# Patient Record
Sex: MALE | Race: WHITE | NOT HISPANIC OR LATINO | Employment: UNEMPLOYED | URBAN - METROPOLITAN AREA
[De-identification: names, ages, dates, MRNs, and addresses within clinical notes are randomized per-mention and may not be internally consistent; named-entity substitution may affect disease eponyms.]

---

## 2017-01-19 ENCOUNTER — HOSPITAL ENCOUNTER (OUTPATIENT)
Dept: RADIOLOGY | Facility: CLINIC | Age: 52
Discharge: HOME | End: 2017-01-19
Admitting: EMERGENCY MEDICINE
Payer: MEDICARE

## 2017-01-19 ENCOUNTER — OFFICE VISIT (OUTPATIENT)
Dept: URGENT CARE | Facility: CLINIC | Age: 52
End: 2017-01-19
Payer: MEDICARE

## 2017-01-19 DIAGNOSIS — M25.579 PAIN IN ANKLE: ICD-10-CM

## 2017-01-19 PROCEDURE — 99214 OFFICE O/P EST MOD 30 MIN: CPT

## 2017-01-19 PROCEDURE — G0463 HOSPITAL OUTPT CLINIC VISIT: HCPCS

## 2017-01-19 PROCEDURE — 73610 X-RAY EXAM OF ANKLE: CPT

## 2017-02-21 ENCOUNTER — TRANSCRIBE ORDERS (OUTPATIENT)
Dept: LAB | Facility: CLINIC | Age: 52
End: 2017-02-21

## 2017-02-21 ENCOUNTER — APPOINTMENT (OUTPATIENT)
Dept: LAB | Facility: CLINIC | Age: 52
End: 2017-02-21
Payer: MEDICARE

## 2017-02-21 DIAGNOSIS — E55.9 VITAMIN D DEFICIENCY: ICD-10-CM

## 2017-02-21 DIAGNOSIS — R53.83 OTHER FATIGUE: ICD-10-CM

## 2017-02-21 LAB
25(OH)D3 SERPL-MCNC: 35.9 NG/ML (ref 30–100)
ERYTHROCYTE [DISTWIDTH] IN BLOOD BY AUTOMATED COUNT: 14.4 % (ref 11.6–15.1)
FERRITIN SERPL-MCNC: 115 NG/ML (ref 8–388)
FOLATE SERPL-MCNC: 15.7 NG/ML (ref 3.1–17.5)
HCT VFR BLD AUTO: 41.8 % (ref 36.5–49.3)
HGB BLD-MCNC: 14.4 G/DL (ref 12–17)
IRON SERPL-MCNC: 101 UG/DL (ref 65–175)
MCH RBC QN AUTO: 32.2 PG (ref 26.8–34.3)
MCHC RBC AUTO-ENTMCNC: 34.4 G/DL (ref 31.4–37.4)
MCV RBC AUTO: 94 FL (ref 82–98)
PLATELET # BLD AUTO: 117 THOUSANDS/UL (ref 149–390)
PMV BLD AUTO: 12.2 FL (ref 8.9–12.7)
RBC # BLD AUTO: 4.47 MILLION/UL (ref 3.88–5.62)
TSH SERPL DL<=0.05 MIU/L-ACNC: 3.07 UIU/ML (ref 0.36–3.74)
VIT B12 SERPL-MCNC: 355 PG/ML (ref 100–900)
WBC # BLD AUTO: 4.96 THOUSAND/UL (ref 4.31–10.16)

## 2017-02-21 PROCEDURE — 82306 VITAMIN D 25 HYDROXY: CPT

## 2017-02-21 PROCEDURE — 82728 ASSAY OF FERRITIN: CPT

## 2017-02-21 PROCEDURE — 83540 ASSAY OF IRON: CPT

## 2017-02-21 PROCEDURE — 82607 VITAMIN B-12: CPT

## 2017-02-21 PROCEDURE — 84443 ASSAY THYROID STIM HORMONE: CPT

## 2017-02-21 PROCEDURE — 85027 COMPLETE CBC AUTOMATED: CPT

## 2017-02-21 PROCEDURE — 36415 COLL VENOUS BLD VENIPUNCTURE: CPT

## 2017-02-21 PROCEDURE — 82746 ASSAY OF FOLIC ACID SERUM: CPT

## 2017-03-08 ENCOUNTER — GENERIC CONVERSION - ENCOUNTER (OUTPATIENT)
Dept: OTHER | Facility: OTHER | Age: 52
End: 2017-03-08

## 2017-03-08 ENCOUNTER — ALLSCRIPTS OFFICE VISIT (OUTPATIENT)
Dept: FAMILY MEDICINE CLINIC | Facility: CLINIC | Age: 52
End: 2017-03-08
Payer: MEDICARE

## 2017-03-08 DIAGNOSIS — R63.0 ANOREXIA: ICD-10-CM

## 2017-03-08 DIAGNOSIS — R53.83 OTHER FATIGUE: ICD-10-CM

## 2017-03-08 LAB — HBA1C MFR BLD HPLC: 6.2 %

## 2017-03-08 PROCEDURE — 99213 OFFICE O/P EST LOW 20 MIN: CPT | Performed by: NURSE PRACTITIONER

## 2017-03-10 ENCOUNTER — ALLSCRIPTS OFFICE VISIT (OUTPATIENT)
Dept: FAMILY MEDICINE CLINIC | Facility: CLINIC | Age: 52
End: 2017-03-10
Payer: MEDICARE

## 2017-03-10 ENCOUNTER — APPOINTMENT (OUTPATIENT)
Dept: LAB | Facility: HOSPITAL | Age: 52
End: 2017-03-10
Payer: MEDICARE

## 2017-03-10 DIAGNOSIS — R63.0 ANOREXIA: ICD-10-CM

## 2017-03-10 DIAGNOSIS — R53.83 OTHER FATIGUE: ICD-10-CM

## 2017-03-10 LAB
FLUAV AG SPEC QL: NORMAL
FLUBV AG SPEC QL: NORMAL
RSV B RNA SPEC QL NAA+PROBE: NORMAL

## 2017-03-10 PROCEDURE — 87798 DETECT AGENT NOS DNA AMP: CPT

## 2017-03-10 PROCEDURE — 99213 OFFICE O/P EST LOW 20 MIN: CPT | Performed by: NURSE PRACTITIONER

## 2017-05-19 ENCOUNTER — TRANSCRIBE ORDERS (OUTPATIENT)
Dept: LAB | Facility: CLINIC | Age: 52
End: 2017-05-19

## 2017-05-19 ENCOUNTER — APPOINTMENT (OUTPATIENT)
Dept: LAB | Facility: CLINIC | Age: 52
End: 2017-05-19
Payer: MEDICARE

## 2017-05-19 DIAGNOSIS — Z79.899 ENCOUNTER FOR LONG-TERM (CURRENT) USE OF OTHER MEDICATIONS: Primary | ICD-10-CM

## 2017-05-19 LAB
ALBUMIN SERPL BCP-MCNC: 3.5 G/DL (ref 3.5–5)
ALP SERPL-CCNC: 45 U/L (ref 46–116)
ALT SERPL W P-5'-P-CCNC: 25 U/L (ref 12–78)
ANION GAP SERPL CALCULATED.3IONS-SCNC: 6 MMOL/L (ref 4–13)
AST SERPL W P-5'-P-CCNC: 18 U/L (ref 5–45)
BILIRUB SERPL-MCNC: 0.26 MG/DL (ref 0.2–1)
BUN SERPL-MCNC: 25 MG/DL (ref 5–25)
CALCIUM SERPL-MCNC: 9 MG/DL (ref 8.3–10.1)
CHLORIDE SERPL-SCNC: 107 MMOL/L (ref 100–108)
CHOLEST SERPL-MCNC: 158 MG/DL (ref 50–200)
CO2 SERPL-SCNC: 27 MMOL/L (ref 21–32)
CREAT SERPL-MCNC: 0.64 MG/DL (ref 0.6–1.3)
EST. AVERAGE GLUCOSE BLD GHB EST-MCNC: 131 MG/DL
GFR SERPL CREATININE-BSD FRML MDRD: >60 ML/MIN/1.73SQ M
GLUCOSE P FAST SERPL-MCNC: 119 MG/DL (ref 65–99)
HBA1C MFR BLD: 6.2 % (ref 4.2–6.3)
HDLC SERPL-MCNC: 67 MG/DL (ref 40–60)
LDLC SERPL CALC-MCNC: 77 MG/DL (ref 0–100)
POTASSIUM SERPL-SCNC: 4.1 MMOL/L (ref 3.5–5.3)
PROT SERPL-MCNC: 6.8 G/DL (ref 6.4–8.2)
SODIUM SERPL-SCNC: 140 MMOL/L (ref 136–145)
TRIGL SERPL-MCNC: 70 MG/DL

## 2017-05-19 PROCEDURE — 83036 HEMOGLOBIN GLYCOSYLATED A1C: CPT

## 2017-05-19 PROCEDURE — 80053 COMPREHEN METABOLIC PANEL: CPT

## 2017-05-19 PROCEDURE — 36415 COLL VENOUS BLD VENIPUNCTURE: CPT

## 2017-05-19 PROCEDURE — 80061 LIPID PANEL: CPT

## 2017-06-15 ENCOUNTER — ALLSCRIPTS OFFICE VISIT (OUTPATIENT)
Dept: FAMILY MEDICINE CLINIC | Facility: CLINIC | Age: 52
End: 2017-06-15
Payer: MEDICARE

## 2017-06-15 PROCEDURE — 99213 OFFICE O/P EST LOW 20 MIN: CPT | Performed by: FAMILY MEDICINE

## 2017-06-26 ENCOUNTER — GENERIC CONVERSION - ENCOUNTER (OUTPATIENT)
Dept: OTHER | Facility: OTHER | Age: 52
End: 2017-06-26

## 2017-06-27 ENCOUNTER — GENERIC CONVERSION - ENCOUNTER (OUTPATIENT)
Dept: OTHER | Facility: OTHER | Age: 52
End: 2017-06-27

## 2017-10-26 ENCOUNTER — APPOINTMENT (OUTPATIENT)
Dept: RADIOLOGY | Facility: CLINIC | Age: 52
End: 2017-10-26
Payer: MEDICARE

## 2017-10-26 ENCOUNTER — OFFICE VISIT (OUTPATIENT)
Dept: URGENT CARE | Facility: CLINIC | Age: 52
End: 2017-10-26
Payer: MEDICARE

## 2017-10-26 DIAGNOSIS — R52 PAIN: ICD-10-CM

## 2017-10-26 PROCEDURE — 73610 X-RAY EXAM OF ANKLE: CPT

## 2017-10-26 PROCEDURE — 99213 OFFICE O/P EST LOW 20 MIN: CPT

## 2017-10-26 PROCEDURE — G0463 HOSPITAL OUTPT CLINIC VISIT: HCPCS

## 2017-10-28 NOTE — PROGRESS NOTES
Assessment  1  Ankle sprain (845 00) (J74 409A)    Plan  Ankle sprain    · Ace Bandage; Status:Complete;   Done: 40CZW0678 04:03PM  Common cold    · Levocetirizine Dihydrochloride 5 MG Oral Tablet  Diabetes Mellitus    · MetFORMIN HCl - 1000 MG Oral Tablet  Unlinked    · Benztropine Mesylate 1 MG Oral Tablet   · SEROquel 200 MG Oral Tablet (QUEtiapine Fumarate)    Discussion/Summary  Discussion Summary:   Xray appears negative for fracture  will follow up with radiologist report  wear ace wrap  can take ibuprofen for pain  Medication Side Effects Reviewed: Possible side effects of new medications were reviewed with the patient/guardian today  Understands and agrees with treatment plan: The treatment plan was reviewed with the patient/guardian  The patient/guardian understands and agrees with the treatment plan   Follow Up Instructions: Follow Up with your Primary Care Provider in 7 days  If your symptoms worsen, go to the nearest [facility] Emergency Department  Chief Complaint  1  Ankle Pain  Chief Complaint Free Text Note Form: Here with caretaker due to left ankle injury yesterday  History of Present Illness  HPI: 50year old male here with care taker  Patient fell yesterday and has left ankle pain, swelling and bruising  Caretaker has been using ice baths to reduce swelling  Patient is limping while walking  Hospital Based Practices Required Assessment:   Pain Assessment   the patient states they have pain  The pain is located in the ankle  Abuse And Domestic Violence Screen   Domestic violence screen not done today  Reason DV Screen not done: not alone in room    Ankle Pain: [facility] presents with complaints of ankle pain     Associated symptoms include swelling,-- localized bruising,-- decreased range of motion-- and-- difficulty bearing weight, but-- no redness,-- no warmth,-- no instability,-- no stiffness,-- no fever,-- no chills,-- no localized rash,-- no generalized rash,-- no pain in other joints-- and-- no lateral foot pain  Review of Systems  Focused-Male:   Constitutional: no fever or chills, feels well, no tiredness, no recent weight loss or weight gain  Cardiovascular: no complaints of slow or fast heart rate, no chest pain, no palpitations, no leg claudication or lower extremity edema  Respiratory: no complaints of shortness of breath, no wheezing or cough, no dyspnea on exertion, no orthopnea or PND  Musculoskeletal: joint swelling, but-- as noted in HPI  Integumentary: no complaints of skin rash or lesion, no itching or dry skin, no skin wounds  Neurological: no complaints of headache, no confusion, no numbness or tingling, no dizziness or fainting  ROS Reviewed:   ROS reviewed  Active Problems  1  Abrasion of left ear (910 0) (S00 412A)   2  Ankle Injury (959 7)   3  Ankle pain (719 47) (M25 579)   4  Ankle sprain (845 00) (S93 409A)   5  Appetite loss (783 0) (R63 0)   6  Cataract (366 9) (H26 9)   7  Cellulitis of face (682 0) (L03 211)   8  Cerebral palsy (343 9) (G80 9)   9  Colon cancer screening (V76 51) (Z12 11)   10  Common cold (460) (J00)   11  Constipation (564 00) (K59 00)   12  Contusion of knee, right (924 11) (S80 01XA)   13  Diabetes Mellitus (250 00)   14  Diabetes mellitus type 2, controlled (250 00) (E11 9)   15  Dysuria (788 1) (R30 0)   16  Encounter for PPD test (V74 1) (Z11 1)   17  Encounter for screening for diabetic retinopathy (V80 2) (Z13 5)   18  Facial rash (782 1) (R21)   19  Fatigue (780 79) (R53 83)   20  Flu vaccine need (V04 81) (Z23)   21  Foot contusion (924 20) (S90 30XA)   22  Foot Pain (Soft Tissue) (729 5)   23  [place] Seizure (345 10)   24  Headache (784 0) (R51)   25  Headache (784 0) (R51)   26  Hematuria (599 70) (R31 9)   27  Hyperlipidemia (272 4) (E78 5)   28  Hypertension (401 9) (I10)   29  Hypertension (401 9) (I10)   30  Hypothyroidism (244 9) (E03 9)   31  Hypothyroidism (244 9) (E03 9)   32   Knee injury, right, initial encounter (959 7) (S89 91XA)   33  Nausea (787 02) (R11 0)   34  Nearsightedness (367 1) (H52 10)   35  Need for influenza vaccination (V04 81) (Z23)   36  Need for Tdap vaccination (V06 1) (Z23)   37  Osteoporosis (733 00) (M81 0)   38  Routine health maintenance (V70 0) (Z00 00)   39  Scoliosis (737 30) (M41 9)   40  Screening for prostate cancer (V76 44) (Z12 5)   41  Seborrheic dermatitis of scalp (690 18) (L21 9)   42  Subconjunctival hemorrhage of left eye (372 72) (H11 32)   43  Toe fracture (826 0) (S92 919A)   44  Vitamin B12 deficiency (266 2) (E53 8)   45  Vitamin D deficiency (268 9) (E55 9)   46  Well adult health check (V70 0) (Z00 00)    Past Medical History  Active Problems And Past Medical History Reviewed: The active problems and past medical history were reviewed and updated today  Family History  Family History Reviewed: The family history was reviewed and updated today  Social History   · Denied: History of Alcohol Use (History)   · Denied: History of Drug Use   · Never A Smoker  Social History Reviewed: The social history was reviewed and updated today  The social history was reviewed and is unchanged  Surgical History  1  History of Appendectomy  Surgical History Reviewed: The surgical history was reviewed and updated today  Current Meds   1  Benztropine Mesylate 0 5 MG Oral Tablet; TAKE 1 TABLET TWICE DAILY AS NEEDED; Therapy: (Recorded:08Mar2017) to Recorded   2  Benztropine Mesylate 1 MG Oral Tablet; Therapy: (Recorded:02Jan2016) to Recorded   3  Calcium 500 +D 500-400 MG-UNIT Oral Tablet; Therapy: (Recorded:21Mar2014) to Recorded   4  [employer] Vitamin B-12 1000 MCG Oral Tablet; take 1 tablet every other day; Therapy: 96AYD1334 to (Last Rx:08Mar2017)  Requested for: 44GWJ9865 Ordered   5  Depakote  MG Oral Tablet Extended Release 24 Hour; Take 2 tablets in the am   and 3 tablets at night;    Therapy: (Recorded:08Mar2017) to Recorded   6  Docusate Sodium 100 MG Oral Capsule; TAKE 1 CAPSULE BY MOUTH TWICE DAILY ( 8   AM & 8 PM)  Requested for: 10GAR4835; Last Rx:15Jun2017 Ordered   7  Ergocalciferol 23567 UNIT CAPS; TAKE 1 CAPSULE WEEKLY  Requested for:   00OCE6388; Last Rx:16Sep2016 Ordered   8  Gabapentin 100 MG TABS; Take 200mg in the am, 100 mg at 2pm and 8pm;   Therapy: (Recorded:08Mar2017) to Recorded   9  Haldol 2 MG TABS; Therapy: (9651 1475) to Recorded   10  Ibuprofen 200 MG Oral Tablet; TAKE 2 TABLETS EVERY 6 HOURS AS NEEDED FOR    PAIN  Requested for: 46GPJ1215; Last Rx:16Sep2016 Ordered   11  Ibuprofen 400 MG Oral Tablet; TAKE 1 TABLET EVERY 6 HOURS AS NEEDED; Therapy: 55TBA6173 to (Han Drilling)  Requested for: 88SMM0779; Last    Rx:02Oct2017 Ordered   12  Ketoconazole 2 % External Shampoo; [address] HAIR IN SIDEBURNS AT LEAST THREE    TIMES PER WEEK (8 PM); Therapy: 41LFG2937 to (Evaluate:28Qbv9471)  Requested for: 48EUT7857; Last    Rx:16Zzi5583 Ordered   13  Levocetirizine Dihydrochloride 5 MG Oral Tablet; TAKE 1 TABLET BY MOUTH ONCE A DAY    AS NEEDED FOR ALLERGY, RUNNY NOSE; Therapy: 85YAU4009 to (Evaluate:08Apr2017)  Requested for: 08DYX1071; Last    Rx:09Mar2017 Ordered   14  Levocetirizine Dihydrochloride 5 MG Oral Tablet; TAKE 1 TABLET DAILY; Therapy: 58WRO8130 to (Evaluate:92Ffm4892)  Requested for: 05DWK9853; Last    Rx:02Oct2017 Ordered   15  Levothyroxine Sodium 100 MCG Oral Tablet; TAKE 1 TABLET DAILY AS DIRECTED; Therapy: 58MRL8690 to (Kaila Marvin)  Requested for: 55DFT6229; Last    Rx:05Jan2016 Ordered   16  Levothyroxine Sodium 100 MCG Oral Tablet; TAKE 1 TABLET DAILY AS DIRECTED; Therapy: 01QNY3571 to (Evaluate:01Jan2018)  Requested for: 30YDZ9847; Last    Rx:03Oct2017 Ordered   17  Lisinopril 10 MG Oral Tablet; TAKE 1 TABLET BY MOUTH ONCE DAILY AS DIRECTED (8    AM) ***STOP LISINOPRIL/ HCTZ 10/12  5MG *****;    Therapy: 62TCN6112 to (Evaluate:67Gch9378)  Requested for: 89GCX2805; Last    Rx:34Sfb1097 Ordered   18  Lisinopril-Hydrochlorothiazide 10-12 5 MG Oral Tablet; TAKE 1 TABLET DAILY; Therapy: 65PAH6962 to (Lacinda Shows)  Requested for: 01JLO6716; Last    Rx:03Jan2017 Ordered   19  Loratadine 10 MG Oral Tablet; TAKE 1 TABLET BY MOUTH ONCE A DAY AS NEEDED    FOR ALLERGY, RUNNY NOSE; Therapy: 41BHK4300 to (Evaluate:08Apr2017)  Requested for: 87RYB3127; Last    Rx:09Mar2017 Ordered   20  MetFORMIN HCl - 1000 MG Oral Tablet; TAKE 1 TABLET BY MOUTH 2 TIMES DAILY (8 AM    + 8 PM); Therapy: 77SLJ2430 to (Dossie Hamilton)  Requested for: 77WJG5270; Last    Rx:84Gff5696 Ordered   21  MetFORMIN HCl - 1000 MG Oral Tablet; Take 1 tablet twice daily; Therapy: 82ZVJ1065 to ([phone])  Requested for: 27JQB6770; Last    Rx:05Jan2016 Ordered   22  Multi-Vitamin TABS; Therapy: (Recorded:21Mar2014) to Recorded   23  Oyster Shell Calcium 500 MG Oral Tablet; TAKE 1 TABLET BY MOUTH 3 TIMES DAILY (8    AM, 2 PM & 8 PM); Therapy: 13HRN4858 to (Dossie Hamilton)  Requested for: 73KRD5934; Last    Rx:07Kjx4215 Ordered   24  Reguloid 58 6 % Oral Powder; DISSOLVE 3 5 GM (ROUNDED TEASPOONFUL) IN    WATER OR JUICE AND DRINK DAILY (8 AM) --- DX: PROMOTE BOWEL REGULARITY; Therapy: 90JJX4696 to (Evaluate:41Yxw7138)  Requested for: 14ECO7967; Last    Rx:59Xnu0643 Ordered   25  SEROquel 200 MG Oral Tablet; Therapy: (Recorded:21Mar2014) to Recorded   26  SEROquel  MG Oral Tablet Extended Release 24 Hour; Take one at 8am and one    at 2pm;    Therapy: (Recorded:04May2016) to Recorded   27  SEROquel  MG Oral Tablet Extended Release 24 Hour; TAKE 1 TABLET DAILY; Therapy: (Recorded:04May2016) to Recorded   28  Simvastatin 40 MG Oral Tablet; TAKE 1 TABLET BY MOUTH ONCE DAILY (8 AM); Therapy: 59UFO1666 to (Dossie Hamilton)  Requested for: 36VFY2009; Last    Rx:52Wty6985 Ordered   29   Stool Softener 100 MG Oral Capsule; TAKE 1 CAPSULE BY MOUTH TWICE DAILY ( 8 AM &    8 PM); Therapy: 27IZS1621 to (Evaluate:25Apr2017)  Requested for: 68DQH7177; Last    GZ:65KWT9059 Ordered   30  Tab-A-Reina Oral Tablet; TAKE 1 TABLET BY MOUTH ONCE DAILY (8 AM); Therapy: 81WSL8398 to ([facility])  Requested for: 41HJM2911; Last    Rx:68Ngq3148 Ordered   31  Vistaril 50 MG Oral Capsule; TAKE 1 CAPSULE TWICE DAILY; Therapy: (Recorded:04May2016) to Recorded   32  Vistaril CAPS; Therapy: (456 9424) to Recorded   33  Vitamin B-12 1000 MCG Oral Tablet; TAKE 1 TABLET BY MOUTH EVERY OTHER DAY (8    AM); Therapy: 70UWK2277 to ([facility])  Requested for: 79OKU2318; Last    Rx:32Egv0125 Ordered   34  Vitamin D 1000 UNIT Oral Tablet; TAKE 1 TABLET DAILY; Therapy: 41UMF3649 to (Evaluate:77Slu0712)  Requested for: 91QTE3888; Last    Rx:08Mar2017 Ordered   28  Vitamin D3 1000 UNIT Oral Tablet; TAKE 1 TABLET BY MOUTH DAILY (8 AM); Therapy: 09DOH2037 to ([facility])  Requested for: 48KQS7159; Last    Rx:80Lbv5263 Ordered   36  Vitamin D3 TABS; Therapy: (Recorded:02Jan2016) to Recorded   37  Zonisamide 100 MG Oral Capsule; Take 6 capsules at bedtime; Therapy: (Recorded:04May2016) to Recorded  Medication List Reviewed: The medication list was reviewed and updated today  Allergies  1  Erythromycin TABS   2  Penicillins    Vitals  Signs   Recorded: 81UEN1497 03:44PM   Temperature: 97 F  Heart Rate: 97  Respiration: 18  O2 Saturation: 98  Recorded: 15IRN3043 03:43PM   Temperature: 97 F  Heart Rate: 97  Respiration: 18  Systolic: 267  Diastolic: 78  O2 Saturation: 98    Physical Exam    Constitutional   General appearance: No acute distress, well appearing and well nourished  Pulmonary   Respiratory effort: No increased work of breathing or signs of respiratory distress  Auscultation of lungs: Clear to auscultation  Cardiovascular   Auscultation of heart: Normal rate and rhythm, normal S1 and S2, without murmurs  Musculoskeletal   Gait and station: Abnormal   Gait evaluation demonstrated antalgia on the left  Inspection/palpation of joints, bones, and muscles: Abnormal  -- left ankle with limited ROM, tender to palpation and swelling noted medial aspect of ankle and over medial malleolus        Future Appointments    Date/Time Provider Specialty Site   12/18/2017 01:45 PM Angus Guadarrama, [facility] Simons     Signatures   Electronically signed by : Trisha Thomas, [facility]; Oct 26 2017  4:05PM EST                       (Author)    Electronically signed by : SANTY Santamaria ; Oct 27 2017  1:46PM EST                       (Co-author)

## 2017-12-12 ENCOUNTER — ALLSCRIPTS OFFICE VISIT (OUTPATIENT)
Dept: FAMILY MEDICINE CLINIC | Facility: CLINIC | Age: 52
End: 2017-12-12
Payer: MEDICARE

## 2017-12-12 DIAGNOSIS — Z12.11 ENCOUNTER FOR SCREENING FOR MALIGNANT NEOPLASM OF COLON: ICD-10-CM

## 2017-12-12 DIAGNOSIS — G40.309 GENERALIZED IDIOPATHIC EPILEPSY AND EPILEPTIC SYNDROMES, WITHOUT STATUS EPILEPTICUS, NOT INTRACTABLE (HCC): ICD-10-CM

## 2017-12-12 DIAGNOSIS — E11.9 TYPE 2 DIABETES MELLITUS WITHOUT COMPLICATIONS (HCC): ICD-10-CM

## 2017-12-12 DIAGNOSIS — Z12.5 ENCOUNTER FOR SCREENING FOR MALIGNANT NEOPLASM OF PROSTATE: ICD-10-CM

## 2017-12-12 DIAGNOSIS — R53.83 OTHER FATIGUE: ICD-10-CM

## 2017-12-12 PROCEDURE — 99213 OFFICE O/P EST LOW 20 MIN: CPT | Performed by: FAMILY MEDICINE

## 2017-12-27 NOTE — PROGRESS NOTES
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 309)   5  History of Colon cancer screening (V76 51) (Z12 11)   6  Colon cancer screening (V76 51) (Z12 11)   7  Fatigue (780 79) (R53 83)   8  Hypothyroidism (244 9) (E03 9)    Plan   Diabetes mellitus type 2, controlled    · FRITZ DORA (OPTHAMOLOGY ) Co-Management  *  Status: Hold For -    Scheduling  Requested for: 94Zcm9223  Care Summary provided  : Yes   · Podiatry Referral Other Co-Management  *  Status: Hold For - Scheduling  Requested for:    11Jlw1052  are Referring to a non-SL Preferred Provider : Insurance Coverage  Care Summary provided  : Yes   · (1) COMPREHENSIVE METABOLIC PANEL; Status:Active; Requested for:20Nsm2791;    · (1) HEMOGLOBIN A1C; Status:Active; Requested for:17Wft1445;    · (1) LIPID PANEL FASTING W DIRECT LDL REFLEX; Status:Active; Requested    for:80Hes8651;    · (1) MICROALBUMIN CREATININE RATIO, RANDOM URINE; Status:Active; Requested    for:66Hes2919;    · (1) TSH; Status:Active; Requested for:09Mec2744;    · (1) URINALYSIS (will reflex a microscopy if leukocytes, occult blood, protein or nitrites are    not within normal limits); Status:Active; Requested for:49Vfh9655;    · Hemoglobin A1c- POC; Status:Canceled; Fatigue    · (1) IRON; Status:Active; Requested for:63Lgg1169;    · (1) VITAMIN B12; Status:Active; Requested for:52Nfu3557;    · (1) VITAMIN D 25-HYDROXY; Status:Active; Requested for:49Uzn3353;   Flu vaccine need    · Flulaval Quadrivalent 0 5 ML Intramuscular Suspension Prefilled Syringe  Generalized tonic clonic epilepsy    · (1) VALPROIC ACID (DEPAKOTE); Status:Active;  Requested for:41Kjw3508;   Need for pneumococcal vaccination    · Pneumo (Pneumovax)  PMH: Colon cancer screening    · (1) OCCULT BLOOD, FECAL IMMUNOCHEMICAL TEST; Status:Active; Requested    for:88Btr6860;   PMH: Colon cancer screening, Generalized tonic clonic epilepsy    · COLONOSCOPY; Status:Active; Requested for:51Sfq8123;   Special screening, prostate cancer    · (1) PSA (SCREEN) (Dx V76 44 Screen for Prostate Cancer); Status:Active; Requested    for:48Ddk8337;    · Prostate Exam; Status:Complete;   Done: 90NZL5601 02:05PM    Discussion/Summary      Type 2 diabetes-advise caretaker to obtain glucometer to check sugars if symptomatic  Glucose tab should be on hand if needed  Forms provided for recent eye and foot exams for on chart  Routine labs given  TSH level  maintenance-occult blood testing given, PSA ordered  6 months      Follow-up 6 months    The patient's caretaker was counseled regarding instructions for management,-- importance of compliance with treatment  Immunization Counseling Total number of vaccine components counseled: 2  total time of encounter was 25 minutes-- and-- 10 minutes was spent counseling  Possible side effects of new medications were reviewed with the patient/guardian today  The treatment plan was reviewed with the patient/guardian  The patient/guardian understands and agrees with the treatment plan      Chief Complaint   pt is here for a check up  Flu and Pnuemo shot    Patient is here today for follow up of chronic conditions described in HPI  History of Present Illness   50year old male here today for regular check up  Pt has known type 2 diabetes, does not check sugars, no reports of hypo/hyperglycemia  He has a care taker through [facility]  appetite is good  One fall in October from tripping  No accidents with bowel or bladder  Scab to right knee  Is not involved with adult day care  Review of Systems        Constitutional: No fever or chills, feels well, no tiredness, no recent weight gain or weight loss  Eyes: No complaints of eye pain, no red eyes, no discharge from eyes, no itchy eyes        ENT: no complaints of earache, no hearing loss, no nosebleeds, no nasal discharge, no sore throat, no hoarseness  Cardiovascular: No complaints of slow heart rate, no fast heart rate, no chest pain, no palpitations, no leg claudication, no lower extremity  Respiratory: No complaints of shortness of breath, no wheezing, no cough, no SOB on exertion, no orthopnea or PND  Gastrointestinal: No complaints of abdominal pain, no constipation, no nausea or vomiting, no diarrhea or bloody stools  Genitourinary: No complaints of dysuria, no incontinence, no hesitancy, no nocturia, no genital lesion, no testicular pain  Musculoskeletal: No complaints of arthralgia, no myalgias, no joint swelling or stiffness, no limb pain or swelling  Integumentary: No complaints of skin rash or skin lesions, no itching, no skin wound, no dry skin  Neurological: No compliants of headache, no confusion, no convulsions, no numbness or tingling, no dizziness or fainting, no limb weakness, no difficulty walking  Psychiatric: Is not suicidal, no sleep disturbances, no anxiety or depression, no change in personality, no emotional problems  Endocrine: No complaints of proptosis, no hot flashes, no muscle weakness, no erectile dysfunction, no deepening of the voice, no feelings of weakness  Hematologic/Lymphatic: No complaints of swollen glands, no swollen glands in the neck, does not bleed easily, no easy bruising  Active Problems   1  Cataract (366 9) (H26 9)   2  Cerebral palsy (343 9) (G80 9)   3  Colon cancer screening (V76 51) (Z12 11)   4  Common cold (460) (J00)   5  Constipation (564 00) (K59 00)   6  Contusion of knee, right (924 11) (S80 01XA)   7  Diabetes Mellitus (250 00)   8  Diabetes mellitus type 2, controlled (250 00) (E11 9)   9  Dysuria (788 1) (R30 0)   10  Encounter for PPD test (V74 1) (Z11 1)   11  Encounter for screening for diabetic retinopathy (V80 2) (Z13 5)   12   Facial rash (782 1) (R21)   13  Fatigue (780 79) (R53 83)   14  Flu vaccine need (V04 81) (Z23)   15  Foot contusion (924 20) (S90 30XA)   16  Foot Pain (Soft Tissue) (729 5)   17  Generalized tonic clonic epilepsy (345 10) (G40 309)   18  Headache (784 0) (R51)   19  Headache (784 0) (R51)   20  Hematuria (599 70) (R31 9)   21  Hyperlipidemia (272 4) (E78 5)   22  Hypertension (401 9) (I10)   23  Hypertension (401 9) (I10)   24  Hypothyroidism (244 9) (E03 9)   25  Hypothyroidism (244 9) (E03 9)   26  Knee injury, right, initial encounter (959 7) (S89 91XA)   27  Nausea (787 02) (R11 0)   28  Nearsightedness (367 1) (H52 10)   29  Need for influenza vaccination (V04 81) (Z23)   30  Need for pneumococcal vaccination (V03 82) (Z23)   31  Need for Tdap vaccination (V06 1) (Z23)   32  Osteoporosis (733 00) (M81 0)   33  Routine health maintenance (V70 0) (Z00 00)   34  Scoliosis (737 30) (M41 9)   35  Seborrheic dermatitis of scalp (690 18) (L21 9)   36  Special screening, prostate cancer (V76 44) (Z12 5)   37  Subconjunctival hemorrhage of left eye (372 72) (H11 32)   38  Toe fracture (826 0) (S92 919A)   39  Vitamin B12 deficiency (266 2) (E53 8)   40  Vitamin D deficiency (268 9) (E55 9)   41  Well adult health check (V70 0) (Z00 00)    Past Medical History   1  History of Abrasion of left ear (910 0) (S00 412A)   2  History of Ankle Injury (959 7)   3  History of Ankle pain (719 47) (M25 579)   4  History of Appetite loss (783 0) (R63 0)   5  History of Cellulitis of face (682 0) (L03 211)   6  History of Colon cancer screening (V76 51) (Z12 11)   7  History of sprain of ankle (V13 59) (S47 775)     The active problems and past medical history were reviewed and updated today  Surgical History   1  History of Appendectomy     The surgical history was reviewed and updated today  Family History      The family history was reviewed and updated today         Social History    · Denied: History of Alcohol Use (History)   · Denied: History of Drug Use   · Never A Smoker  The social history was reviewed and updated today  The social history was reviewed and is unchanged  Current Meds    1  Benztropine Mesylate 0 5 MG Oral Tablet; TAKE 1 TABLET TWICE DAILY AS NEEDED; Therapy: (Recorded:08Mar2017) to Recorded   2  Calcium 500 +D 500-400 MG-UNIT Oral Tablet; Therapy: (Recorded:21Mar2014) to Recorded   3  [facility] Vitamin B-12 1000 MCG Oral Tablet; take 1 tablet every other day; Therapy: 13HZP9359 to (Last Rx:08Mar2017)  Requested for: 56MYS8641 Ordered   4  Depakote  MG Oral Tablet Extended Release 24 Hour; Take 2 tablets in the am     and 3 tablets at night; Therapy: (Recorded:08Mar2017) to Recorded   5  DocQLace 100 MG Oral Capsule; TAKE 1 CAPSULE BY MOUTH TWICE DAILY (8 AM + 8     PM); Therapy: 84FFU2357 to (Evaluate:86Jni9497)  Requested for: 85LBG4276; Last     Rx:27Nov2017 Ordered   6  Docusate Sodium 100 MG Oral Capsule; TAKE 1 CAPSULE BY MOUTH TWICE DAILY ( 8     AM & 8 PM)  Requested for: 66VQF2951; Last Rx:15Jun2017 Ordered   7  Ergocalciferol 09937 UNIT CAPS; TAKE 1 CAPSULE WEEKLY  Requested for:     96QLW6800; Last Rx:16Sep2016 Ordered   8  Gabapentin 100 MG TABS; Take 200mg in the am, 100 mg at 2pm and 8pm;     Therapy: (Recorded:08Mar2017) to Recorded   9  Haldol 2 MG TABS; Therapy: (9651 1475) to Recorded   10  Ibuprofen 200 MG Oral Tablet; TAKE 2 TABLETS EVERY 6 HOURS AS NEEDED FOR      PAIN  Requested for: 39YBZ8352; Last Rx:52Xxw8260 Ordered   11  Ibuprofen 400 MG Oral Tablet; TAKE 1 TABLET EVERY 6 HOURS AS NEEDED; Therapy: 88YJV8716 to (Eleanor Hurt)  Requested for: 09JAO2618; Last      Rx:02Oct2017 Ordered   12  Ketoconazole 2 % External Shampoo; WASH HAIR AND SIDEBURNS AT LEAST 3 TIMES      PER WEEK (8 PM); Therapy: 38ZLR1717 to (Evaluate:11Jan2018)  Requested for: 00Aoh7320; Last      Rx:42Xml1579 Ordered   13   Levocetirizine Dihydrochloride 5 MG Oral Tablet; TAKE 1 TABLET DAILY; Therapy: 70TFO9441 to (Evaluate:39Mre3803)  Requested for: 11PJS3406; Last      Rx:02Oct2017 Ordered   14  Levothyroxine Sodium 100 MCG Oral Tablet; TAKE 1 TABLET BY MOUTH ONCE DAILY (8      AM); Therapy: 18MIF2736 to (Evaluate:43Fet3366)  Requested for: 90YXC6874; Last      Rx:27Nov2017 Ordered   15  Lisinopril 10 MG Oral Tablet; TAKE 1 TABLET BY MOUTH ONCE DAILY AS DIRECTED (8      AM) ***STOP LISINOPRIL/HCTZ 10/12  5MG***;      Therapy: 07JOL1219 to (Evaluate:00Zyz5646)  Requested for: 80ALH4912; Last      Rx:24Nov2017 Ordered   16  Loratadine 10 MG Oral Tablet; TAKE 1 TABLET BY MOUTH ONCE A DAY AS NEEDED      FOR ALLERGY, RUNNY NOSE; Therapy: 96VZR7919 to (Evaluate:08Apr2017)  Requested for: 16QAJ7864; Last      Rx:09Mar2017 Ordered   17  MetFORMIN HCl - 1000 MG Oral Tablet; TAKE 1 TABLET BY MOUTH 2 TIMES DAILY (8 AM      + 8 PM); Therapy: 59PBY6492 to (Evaluate:71Gng2899)  Requested for: 57OXT7325; Last      Rx:24Nov2017 Ordered   18  Multi-Vitamin TABS; Therapy: (Recorded:21Mar2014) to Recorded   19  Multi-Vitamins Oral Tablet; TAKE 1 TABLET BY MOUTH ONCE DAILY (8 AM); Therapy: 24LUO4653 to (Evaluate:63Hzo2647)  Requested for: 37BTV3390; Last      Rx:24Nov2017 Ordered   20  Oysco 500 500 MG Oral Tablet; TAKE 1 TABLET BY MOUTH 3 TIMES DAILY (8 AM + 2 PM      + 8 PM); Therapy: 22FYF0230 to (Evaluate:25Qxl8163)  Requested for: 58BSO3653; Last      Rx:24Nov2017 Ordered   21  Oyster Shell Calcium 500 MG Oral Tablet; TAKE 1 TABLET BY MOUTH 3 TIMES DAILY (8      AM, 2 PM & 8 PM); Therapy: 29YKF3711 to (Jeimy Cox)  Requested for: 42HWO0421; Last      Rx:29Hxe9857 Ordered   22  Reguloid 58 6 % Oral Powder; DISSOLVE 3 5 GM (ROUNDED TEASPOONFUL) IN      WATER OR JUICE AND DRINK DAILY (8 AM) ---- DX: PROMOTE BOWEL REGULARITY; Therapy: 31IPE0101 to (Evaluate:35Vdb8554)  Requested for: 65HZY4382; Last      Rx:27Nov2017 Ordered   23   SEROquel  MG Oral Tablet Extended Release 24 Hour; Take one at 8am and one      at 2pm;      Therapy: (Recorded:04May2016) to Recorded   24  SEROquel  MG Oral Tablet Extended Release 24 Hour; TAKE 1 TABLET DAILY; Therapy: (Recorded:04May2016) to Recorded   25  Simvastatin 40 MG Oral Tablet; TAKE 1 TABLET BY MOUTH ONCE DAILY (8 AM); Therapy: 89URA2198 to (Evaluate:93Pry0471)  Requested for: 68XPE0527; Last      Rx:24Nov2017 Ordered   26  Stool Softener 100 MG Oral Capsule; TAKE 1 CAPSULE BY MOUTH TWICE DAILY ( 8 AM &      8 PM); Therapy: 69ITD9228 to (Evaluate:25Apr2017)  Requested for: 10GOH3587; Last      VA:00RSO5432 Ordered   27  Tab-A-Reina Oral Tablet; TAKE 1 TABLET BY MOUTH ONCE DAILY (8 AM); Therapy: 35SDL6642 to (Candie Cano)  Requested for: 91MJE3127; Last      Rx:95Dcf3971 Ordered   28  Vistaril 50 MG Oral Capsule; TAKE 1 CAPSULE TWICE DAILY; Therapy: (Recorded:04May2016) to Recorded   29  Vistaril CAPS; Therapy: (476 1626) to Recorded   30  Vitamin B-12 1000 MCG Oral Tablet; TAKE 1 TABLET BY MOUTH EVERY OTHER DAY (8      AM); Therapy: 95KPI7191 to (Candie Cano)  Requested for: 71TCX1257; Last      Rx:15Zth1620 Ordered   31  Vitamin D 1000 UNIT Oral Tablet; TAKE 1 TABLET DAILY; Therapy: 12UYY6171 to (Evaluate:11Umk4357)  Requested for: 75JOF5439; Last      Rx:08Mar2017 Ordered   32  Vitamin D3 1000 UNIT Oral Tablet; TAKE 1 TABLET BY MOUTH DAILY (8 AM); Therapy: 40PBY1079 to (Evaluate:22Znk3734)  Requested for: 19JFT3207; Last      Rx:24Nov2017 Ordered   33  Vitamin D3 TABS; Therapy: (Recorded:02Jan2016) to Recorded   34  Zonisamide 100 MG Oral Capsule; Take 6 capsules at bedtime; Therapy: (Recorded:04May2016) to Recorded     The medication list was reviewed and updated today  Allergies   1  Erythromycin TABS   2   Penicillins    Vitals   Vital Signs    Recorded: 12Dec2017 01:36PM   Temperature 96 F   Heart Rate 90   Respiration 18 Systolic 073   Diastolic 78   Height 5 ft 9 in   Weight 170 lb    BMI Calculated 25 1   BSA Calculated 1 93   O2 Saturation 98     Physical Exam        Constitutional      General appearance: No acute distress, well appearing and well nourished  Pulmonary      Respiratory effort: No increased work of breathing or signs of respiratory distress  Auscultation of lungs: Clear to auscultation, equal breath sounds bilaterally, no wheezes, no rales, no rhonci  Cardiovascular      Auscultation of heart: Normal rate and rhythm, normal S1 and S2, without murmurs  Lymphatic      Palpation of lymph nodes in neck: No lymphadenopathy  Skin      Skin and subcutaneous tissue: Normal without rashes or lesions  Psychiatric      Orientation to person, place and time: Abnormal   Mental Status: oriented to person  Additional Exam:  prostate exam-normal          Signatures    Electronically signed by :  [facility] HUNTER MCNEIL; Dec 12 2017  2:07PM EST                       (Author)     Electronically signed by : Kev Kaye DO; Dec 26 2017  7:37PM EST

## 2018-01-13 VITALS
TEMPERATURE: 95.9 F | HEIGHT: 69 IN | OXYGEN SATURATION: 95 % | RESPIRATION RATE: 17 BRPM | BODY MASS INDEX: 25.62 KG/M2 | WEIGHT: 173 LBS | SYSTOLIC BLOOD PRESSURE: 142 MMHG | HEART RATE: 72 BPM | DIASTOLIC BLOOD PRESSURE: 80 MMHG

## 2018-01-13 VITALS
DIASTOLIC BLOOD PRESSURE: 72 MMHG | WEIGHT: 181 LBS | SYSTOLIC BLOOD PRESSURE: 124 MMHG | HEART RATE: 91 BPM | BODY MASS INDEX: 26.81 KG/M2 | RESPIRATION RATE: 17 BRPM | OXYGEN SATURATION: 99 % | HEIGHT: 69 IN | TEMPERATURE: 95.7 F

## 2018-01-13 VITALS
DIASTOLIC BLOOD PRESSURE: 72 MMHG | WEIGHT: 182 LBS | RESPIRATION RATE: 17 BRPM | HEIGHT: 69 IN | BODY MASS INDEX: 26.96 KG/M2 | TEMPERATURE: 96.8 F | SYSTOLIC BLOOD PRESSURE: 118 MMHG | OXYGEN SATURATION: 96 % | HEART RATE: 94 BPM

## 2018-01-23 VITALS
RESPIRATION RATE: 18 BRPM | WEIGHT: 170 LBS | HEART RATE: 90 BPM | BODY MASS INDEX: 25.18 KG/M2 | DIASTOLIC BLOOD PRESSURE: 78 MMHG | HEIGHT: 69 IN | TEMPERATURE: 96 F | OXYGEN SATURATION: 98 % | SYSTOLIC BLOOD PRESSURE: 110 MMHG

## 2018-02-07 DIAGNOSIS — K59.04 CHRONIC IDIOPATHIC CONSTIPATION: Primary | ICD-10-CM

## 2018-02-08 RX ORDER — DOCUSATE SODIUM 100 MG/1
CAPSULE ORAL
Qty: 60 CAPSULE | Refills: 1 | Status: SHIPPED | OUTPATIENT
Start: 2018-02-08 | End: 2018-05-14 | Stop reason: SDUPTHER

## 2018-02-13 ENCOUNTER — APPOINTMENT (OUTPATIENT)
Dept: LAB | Facility: CLINIC | Age: 53
End: 2018-02-13
Payer: MEDICARE

## 2018-02-13 ENCOUNTER — TRANSCRIBE ORDERS (OUTPATIENT)
Dept: LAB | Facility: CLINIC | Age: 53
End: 2018-02-13

## 2018-02-13 DIAGNOSIS — G40.309 GENERALIZED IDIOPATHIC EPILEPSY AND EPILEPTIC SYNDROMES, WITHOUT STATUS EPILEPTICUS, NOT INTRACTABLE (HCC): ICD-10-CM

## 2018-02-13 DIAGNOSIS — R53.83 OTHER FATIGUE: ICD-10-CM

## 2018-02-13 DIAGNOSIS — Z12.5 ENCOUNTER FOR SCREENING FOR MALIGNANT NEOPLASM OF PROSTATE: ICD-10-CM

## 2018-02-13 DIAGNOSIS — E11.9 TYPE 2 DIABETES MELLITUS WITHOUT COMPLICATIONS (HCC): ICD-10-CM

## 2018-02-13 LAB
25(OH)D3 SERPL-MCNC: 38.1 NG/ML (ref 30–100)
ALBUMIN SERPL BCP-MCNC: 3.4 G/DL (ref 3.5–5)
ALP SERPL-CCNC: 49 U/L (ref 46–116)
ALT SERPL W P-5'-P-CCNC: 23 U/L (ref 12–78)
ANION GAP SERPL CALCULATED.3IONS-SCNC: 7 MMOL/L (ref 4–13)
AST SERPL W P-5'-P-CCNC: 17 U/L (ref 5–45)
BACTERIA UR QL AUTO: ABNORMAL /HPF
BILIRUB SERPL-MCNC: 0.17 MG/DL (ref 0.2–1)
BILIRUB UR QL STRIP: NEGATIVE
BUN SERPL-MCNC: 23 MG/DL (ref 5–25)
CALCIUM SERPL-MCNC: 9 MG/DL (ref 8.3–10.1)
CAOX CRY URNS QL MICRO: ABNORMAL /HPF
CHLORIDE SERPL-SCNC: 105 MMOL/L (ref 100–108)
CHOLEST SERPL-MCNC: 150 MG/DL (ref 50–200)
CLARITY UR: ABNORMAL
CO2 SERPL-SCNC: 29 MMOL/L (ref 21–32)
COLOR UR: YELLOW
CREAT SERPL-MCNC: 0.62 MG/DL (ref 0.6–1.3)
CREAT UR-MCNC: 116 MG/DL
EST. AVERAGE GLUCOSE BLD GHB EST-MCNC: 126 MG/DL
GFR SERPL CREATININE-BSD FRML MDRD: 116 ML/MIN/1.73SQ M
GLUCOSE P FAST SERPL-MCNC: 124 MG/DL (ref 65–99)
GLUCOSE UR STRIP-MCNC: NEGATIVE MG/DL
HBA1C MFR BLD: 6 % (ref 4.2–6.3)
HDLC SERPL-MCNC: 69 MG/DL (ref 40–60)
HGB UR QL STRIP.AUTO: NEGATIVE
IRON SERPL-MCNC: 86 UG/DL (ref 65–175)
KETONES UR STRIP-MCNC: NEGATIVE MG/DL
LDLC SERPL CALC-MCNC: 62 MG/DL (ref 0–100)
LEUKOCYTE ESTERASE UR QL STRIP: ABNORMAL
MICROALBUMIN UR-MCNC: 32.2 MG/L (ref 0–20)
MICROALBUMIN/CREAT 24H UR: 28 MG/G CREATININE (ref 0–30)
NITRITE UR QL STRIP: POSITIVE
NON-SQ EPI CELLS URNS QL MICRO: ABNORMAL /HPF
PH UR STRIP.AUTO: 6 [PH] (ref 4.5–8)
POTASSIUM SERPL-SCNC: 4.1 MMOL/L (ref 3.5–5.3)
PROT SERPL-MCNC: 7.1 G/DL (ref 6.4–8.2)
PROT UR STRIP-MCNC: NEGATIVE MG/DL
PSA SERPL-MCNC: 1.4 NG/ML (ref 0–4)
RBC #/AREA URNS AUTO: ABNORMAL /HPF
SODIUM SERPL-SCNC: 141 MMOL/L (ref 136–145)
SP GR UR STRIP.AUTO: 1.02 (ref 1–1.03)
TRIGL SERPL-MCNC: 97 MG/DL
TSH SERPL DL<=0.05 MIU/L-ACNC: 6.48 UIU/ML (ref 0.36–3.74)
UROBILINOGEN UR QL STRIP.AUTO: 1 E.U./DL
VALPROATE SERPL-MCNC: 132 UG/ML (ref 50–100)
VIT B12 SERPL-MCNC: 811 PG/ML (ref 100–900)
WBC #/AREA URNS AUTO: ABNORMAL /HPF

## 2018-02-13 PROCEDURE — 83036 HEMOGLOBIN GLYCOSYLATED A1C: CPT

## 2018-02-13 PROCEDURE — 83540 ASSAY OF IRON: CPT

## 2018-02-13 PROCEDURE — 81001 URINALYSIS AUTO W/SCOPE: CPT

## 2018-02-13 PROCEDURE — 80061 LIPID PANEL: CPT

## 2018-02-13 PROCEDURE — 82306 VITAMIN D 25 HYDROXY: CPT

## 2018-02-13 PROCEDURE — G0103 PSA SCREENING: HCPCS

## 2018-02-13 PROCEDURE — 80164 ASSAY DIPROPYLACETIC ACD TOT: CPT

## 2018-02-13 PROCEDURE — 82043 UR ALBUMIN QUANTITATIVE: CPT

## 2018-02-13 PROCEDURE — 82607 VITAMIN B-12: CPT

## 2018-02-13 PROCEDURE — 36415 COLL VENOUS BLD VENIPUNCTURE: CPT

## 2018-02-13 PROCEDURE — 80053 COMPREHEN METABOLIC PANEL: CPT

## 2018-02-13 PROCEDURE — 84443 ASSAY THYROID STIM HORMONE: CPT

## 2018-02-13 PROCEDURE — 82570 ASSAY OF URINE CREATININE: CPT

## 2018-02-16 ENCOUNTER — TELEPHONE (OUTPATIENT)
Dept: FAMILY MEDICINE CLINIC | Facility: CLINIC | Age: 53
End: 2018-02-16

## 2018-02-16 DIAGNOSIS — N30.00 ACUTE CYSTITIS WITHOUT HEMATURIA: Primary | ICD-10-CM

## 2018-02-16 DIAGNOSIS — E03.8 HYPOTHYROIDISM DUE TO HASHIMOTO'S THYROIDITIS: ICD-10-CM

## 2018-02-16 DIAGNOSIS — E06.3 HYPOTHYROIDISM DUE TO HASHIMOTO'S THYROIDITIS: ICD-10-CM

## 2018-02-16 RX ORDER — LEVOTHYROXINE SODIUM 125 UG/1
125 CAPSULE ORAL DAILY
Qty: 30 CAPSULE | Refills: 2 | Status: SHIPPED | OUTPATIENT
Start: 2018-02-16 | End: 2018-04-12 | Stop reason: SDUPTHER

## 2018-02-16 RX ORDER — CIPROFLOXACIN 500 MG/1
500 TABLET, FILM COATED ORAL EVERY 12 HOURS SCHEDULED
Qty: 20 TABLET | Refills: 0 | Status: SHIPPED | OUTPATIENT
Start: 2018-02-16 | End: 2018-02-26

## 2018-03-14 DIAGNOSIS — K59.04 CHRONIC IDIOPATHIC CONSTIPATION: ICD-10-CM

## 2018-03-14 DIAGNOSIS — E11.9 TYPE 2 DIABETES MELLITUS WITHOUT COMPLICATION, WITHOUT LONG-TERM CURRENT USE OF INSULIN (HCC): Primary | ICD-10-CM

## 2018-03-15 RX ORDER — KETOCONAZOLE 20 MG/ML
SHAMPOO TOPICAL
Qty: 120 ML | Refills: 3 | OUTPATIENT
Start: 2018-03-15

## 2018-03-15 RX ORDER — LISINOPRIL 10 MG/1
TABLET ORAL
Qty: 30 TABLET | Refills: 3 | OUTPATIENT
Start: 2018-03-15

## 2018-03-15 RX ORDER — DOCUSATE SODIUM 100 MG/1
CAPSULE ORAL
Qty: 60 CAPSULE | Refills: 3 | OUTPATIENT
Start: 2018-03-15

## 2018-03-15 RX ORDER — DIPHENOXYLATE HYDROCHLORIDE AND ATROPINE SULFATE 2.5; .025 MG/1; MG/1
TABLET ORAL
Qty: 30 TABLET | Refills: 3 | OUTPATIENT
Start: 2018-03-15

## 2018-03-15 RX ORDER — PSYLLIUM HUSK 3 G/5.8 G
POWDER (GRAM) ORAL
Qty: 284 G | Refills: 3 | OUTPATIENT
Start: 2018-03-15

## 2018-03-15 RX ORDER — SIMVASTATIN 40 MG
TABLET ORAL
Qty: 30 TABLET | Refills: 3 | OUTPATIENT
Start: 2018-03-15

## 2018-03-15 RX ORDER — MELATONIN
Qty: 30 TABLET | Refills: 3 | OUTPATIENT
Start: 2018-03-15

## 2018-03-15 RX ORDER — CALCIUM CARBONATE 500(1250)
TABLET ORAL
Qty: 90 TABLET | Refills: 3 | OUTPATIENT
Start: 2018-03-15

## 2018-04-12 DIAGNOSIS — E11.9 TYPE 2 DIABETES MELLITUS WITHOUT COMPLICATION, WITHOUT LONG-TERM CURRENT USE OF INSULIN (HCC): ICD-10-CM

## 2018-04-12 DIAGNOSIS — Z00.00 HEALTHCARE MAINTENANCE: Primary | ICD-10-CM

## 2018-04-12 DIAGNOSIS — E06.3 HYPOTHYROIDISM DUE TO HASHIMOTO'S THYROIDITIS: ICD-10-CM

## 2018-04-12 DIAGNOSIS — E03.8 HYPOTHYROIDISM DUE TO HASHIMOTO'S THYROIDITIS: ICD-10-CM

## 2018-04-12 DIAGNOSIS — K59.04 CHRONIC IDIOPATHIC CONSTIPATION: ICD-10-CM

## 2018-04-12 RX ORDER — MELATONIN
Qty: 30 TABLET | Refills: 3 | OUTPATIENT
Start: 2018-04-12

## 2018-04-12 RX ORDER — LEVOTHYROXINE SODIUM 125 UG/1
125 CAPSULE ORAL DAILY
Qty: 30 CAPSULE | Refills: 3 | Status: SHIPPED | OUTPATIENT
Start: 2018-04-12 | End: 2018-05-14 | Stop reason: SDUPTHER

## 2018-04-12 RX ORDER — DIPHENOXYLATE HYDROCHLORIDE AND ATROPINE SULFATE 2.5; .025 MG/1; MG/1
TABLET ORAL
Qty: 30 TABLET | Refills: 3 | OUTPATIENT
Start: 2018-04-12

## 2018-04-12 RX ORDER — LISINOPRIL 10 MG/1
TABLET ORAL
Qty: 30 TABLET | Refills: 3 | OUTPATIENT
Start: 2018-04-12

## 2018-04-12 RX ORDER — DOCUSATE SODIUM 100 MG/1
CAPSULE ORAL
Qty: 60 CAPSULE | Refills: 3 | OUTPATIENT
Start: 2018-04-12

## 2018-04-12 RX ORDER — KETOCONAZOLE 20 MG/ML
SHAMPOO TOPICAL
Qty: 120 ML | Refills: 3 | Status: CANCELLED | OUTPATIENT
Start: 2018-04-12

## 2018-04-12 RX ORDER — THERMOMETER, ELECTRONIC,ORAL
EACH MISCELLANEOUS 2 TIMES DAILY
Qty: 30 G | Refills: 0 | OUTPATIENT
Start: 2018-04-12

## 2018-04-12 RX ORDER — SIMVASTATIN 40 MG
TABLET ORAL
Qty: 30 TABLET | Refills: 3 | OUTPATIENT
Start: 2018-04-12

## 2018-04-12 RX ORDER — CALCIUM CARBONATE 500(1250)
TABLET ORAL
Qty: 90 TABLET | Refills: 3 | OUTPATIENT
Start: 2018-04-12

## 2018-04-12 RX ORDER — PSYLLIUM HUSK 3 G/5.8 G
POWDER (GRAM) ORAL
Qty: 284 G | Refills: 3 | OUTPATIENT
Start: 2018-04-12

## 2018-04-23 DIAGNOSIS — G83.89 CEREBRAL PARESIS WITH HOMOLATERAL ATAXIA (HCC): Primary | ICD-10-CM

## 2018-05-14 DIAGNOSIS — E08.00 DIABETES MELLITUS DUE TO UNDERLYING CONDITION WITH HYPEROSMOLARITY WITHOUT COMA, WITHOUT LONG-TERM CURRENT USE OF INSULIN (HCC): ICD-10-CM

## 2018-05-14 DIAGNOSIS — K59.00 CONSTIPATION, UNSPECIFIED CONSTIPATION TYPE: ICD-10-CM

## 2018-05-14 DIAGNOSIS — G62.9 NEUROPATHY: ICD-10-CM

## 2018-05-14 DIAGNOSIS — E55.9 VITAMIN D DEFICIENCY: ICD-10-CM

## 2018-05-14 DIAGNOSIS — L21.9 SEBORRHEIC DERMATITIS OF SCALP: ICD-10-CM

## 2018-05-14 DIAGNOSIS — R56.9 SEIZURE (HCC): ICD-10-CM

## 2018-05-14 DIAGNOSIS — K59.04 CHRONIC IDIOPATHIC CONSTIPATION: ICD-10-CM

## 2018-05-14 DIAGNOSIS — I10 HYPERTENSION, UNSPECIFIED TYPE: ICD-10-CM

## 2018-05-14 DIAGNOSIS — E03.8 HYPOTHYROIDISM DUE TO HASHIMOTO'S THYROIDITIS: ICD-10-CM

## 2018-05-14 DIAGNOSIS — E53.8 B12 DEFICIENCY: ICD-10-CM

## 2018-05-14 DIAGNOSIS — E06.3 HYPOTHYROIDISM DUE TO HASHIMOTO'S THYROIDITIS: ICD-10-CM

## 2018-05-14 DIAGNOSIS — E78.5 HYPERLIPIDEMIA, UNSPECIFIED HYPERLIPIDEMIA TYPE: Primary | ICD-10-CM

## 2018-05-14 DIAGNOSIS — G23.2 PARKINSON'S PLUS SYNDROME (HCC): Primary | ICD-10-CM

## 2018-05-14 RX ORDER — BENZTROPINE MESYLATE 0.5 MG/1
0.5 TABLET ORAL 2 TIMES DAILY
Qty: 60 TABLET | Refills: 3 | Status: SHIPPED | OUTPATIENT
Start: 2018-05-14 | End: 2018-12-26 | Stop reason: SDUPTHER

## 2018-05-14 RX ORDER — MELATONIN
1000 DAILY
Qty: 90 TABLET | Refills: 0 | Status: SHIPPED | OUTPATIENT
Start: 2018-05-14 | End: 2018-06-12 | Stop reason: SDUPTHER

## 2018-05-14 RX ORDER — PSYLLIUM HUSK 3 G/5.8 G
POWDER (GRAM) ORAL
Refills: 3 | COMMUNITY
Start: 2018-03-14 | End: 2018-05-14 | Stop reason: SDUPTHER

## 2018-05-14 RX ORDER — HALOPERIDOL 2 MG/1
TABLET ORAL
Refills: 0 | Status: CANCELLED | OUTPATIENT
Start: 2018-05-14

## 2018-05-14 RX ORDER — CALCIUM CARBONATE 500(1250)
1 TABLET ORAL 3 TIMES DAILY
Qty: 270 TABLET | Refills: 0 | Status: SHIPPED | OUTPATIENT
Start: 2018-05-14 | End: 2018-06-12 | Stop reason: SDUPTHER

## 2018-05-14 RX ORDER — PSYLLIUM HUSK 3 G/5.8 G
POWDER (GRAM) ORAL DAILY
Qty: 283 G | Refills: 0 | Status: SHIPPED | OUTPATIENT
Start: 2018-05-14 | End: 2018-05-15 | Stop reason: SDUPTHER

## 2018-05-14 RX ORDER — HYDROXYZINE PAMOATE 25 MG/1
CAPSULE ORAL
Refills: 6 | COMMUNITY
Start: 2018-03-14 | End: 2018-05-14 | Stop reason: SDUPTHER

## 2018-05-14 RX ORDER — DIPHENOXYLATE HYDROCHLORIDE AND ATROPINE SULFATE 2.5; .025 MG/1; MG/1
1 TABLET ORAL DAILY
Qty: 90 TABLET | Refills: 0 | Status: SHIPPED | OUTPATIENT
Start: 2018-05-14 | End: 2018-06-12 | Stop reason: SDUPTHER

## 2018-05-14 RX ORDER — GABAPENTIN 100 MG/1
100 CAPSULE ORAL 2 TIMES DAILY
Qty: 120 CAPSULE | Refills: 0 | Status: SHIPPED | OUTPATIENT
Start: 2018-05-14 | End: 2018-05-14 | Stop reason: SDUPTHER

## 2018-05-14 RX ORDER — HYDROXYZINE PAMOATE 25 MG/1
25 CAPSULE ORAL 3 TIMES DAILY PRN
Qty: 30 CAPSULE | Refills: 0 | Status: SHIPPED | OUTPATIENT
Start: 2018-05-14 | End: 2018-11-16 | Stop reason: SDUPTHER

## 2018-05-14 RX ORDER — QUETIAPINE 400 MG/1
TABLET, EXTENDED RELEASE ORAL
Refills: 4 | COMMUNITY
Start: 2018-03-22 | End: 2019-08-01 | Stop reason: SDUPTHER

## 2018-05-14 RX ORDER — LISINOPRIL 10 MG/1
1 TABLET ORAL DAILY
COMMUNITY
Start: 2017-06-16 | End: 2018-05-14 | Stop reason: SDUPTHER

## 2018-05-14 RX ORDER — SIMVASTATIN 40 MG
1 TABLET ORAL DAILY
COMMUNITY
Start: 2017-08-15 | End: 2018-05-14 | Stop reason: SDUPTHER

## 2018-05-14 RX ORDER — LORATADINE 10 MG/1
TABLET ORAL
COMMUNITY
Start: 2017-03-08 | End: 2018-12-12 | Stop reason: SDUPTHER

## 2018-05-14 RX ORDER — DIVALPROEX SODIUM 500 MG/1
500 TABLET, EXTENDED RELEASE ORAL DAILY
Qty: 90 TABLET | Refills: 0 | Status: SHIPPED | OUTPATIENT
Start: 2018-05-14 | End: 2018-05-14 | Stop reason: SDUPTHER

## 2018-05-14 RX ORDER — KETOCONAZOLE 20 MG/ML
SHAMPOO TOPICAL
Qty: 120 ML | Refills: 3 | Status: SHIPPED | OUTPATIENT
Start: 2018-05-14 | End: 2018-08-13 | Stop reason: SDUPTHER

## 2018-05-14 RX ORDER — ZONISAMIDE 100 MG/1
CAPSULE ORAL
COMMUNITY
End: 2018-08-20 | Stop reason: SDUPTHER

## 2018-05-14 RX ORDER — BENZTROPINE MESYLATE 0.5 MG/1
TABLET ORAL
Refills: 4 | COMMUNITY
Start: 2018-03-22 | End: 2018-05-14 | Stop reason: SDUPTHER

## 2018-05-14 RX ORDER — SIMVASTATIN 40 MG
40 TABLET ORAL DAILY
Qty: 90 TABLET | Refills: 0 | Status: SHIPPED | OUTPATIENT
Start: 2018-05-14 | End: 2018-06-12 | Stop reason: SDUPTHER

## 2018-05-14 RX ORDER — MELATONIN
COMMUNITY
Start: 2017-06-16 | End: 2018-05-14 | Stop reason: SDUPTHER

## 2018-05-14 RX ORDER — DOCUSATE SODIUM 100 MG/1
CAPSULE, LIQUID FILLED ORAL
COMMUNITY
Start: 2017-11-27 | End: 2018-05-14

## 2018-05-14 RX ORDER — DIVALPROEX SODIUM 500 MG/1
TABLET, EXTENDED RELEASE ORAL
Qty: 450 TABLET | Refills: 0 | Status: SHIPPED | OUTPATIENT
Start: 2018-05-14 | End: 2019-09-30 | Stop reason: HOSPADM

## 2018-05-14 RX ORDER — DIVALPROEX SODIUM 500 MG/1
TABLET, EXTENDED RELEASE ORAL
COMMUNITY
End: 2018-05-14 | Stop reason: SDUPTHER

## 2018-05-14 RX ORDER — DOCUSATE SODIUM 100 MG/1
100 CAPSULE, LIQUID FILLED ORAL 2 TIMES DAILY
Qty: 60 CAPSULE | Refills: 3 | Status: SHIPPED | OUTPATIENT
Start: 2018-05-14 | End: 2018-07-14 | Stop reason: SDUPTHER

## 2018-05-14 RX ORDER — QUETIAPINE 200 MG/1
TABLET, EXTENDED RELEASE ORAL
Refills: 4 | COMMUNITY
Start: 2018-03-22 | End: 2019-08-01 | Stop reason: SDUPTHER

## 2018-05-14 RX ORDER — GABAPENTIN 100 MG/1
CAPSULE ORAL
Refills: 4 | COMMUNITY
Start: 2018-03-22 | End: 2018-05-14 | Stop reason: SDUPTHER

## 2018-05-14 RX ORDER — LISINOPRIL 10 MG/1
10 TABLET ORAL DAILY
Qty: 90 TABLET | Refills: 0 | Status: SHIPPED | OUTPATIENT
Start: 2018-05-14 | End: 2018-06-12 | Stop reason: SDUPTHER

## 2018-05-14 RX ORDER — GABAPENTIN 100 MG/1
CAPSULE ORAL
Qty: 120 CAPSULE | Refills: 0 | Status: SHIPPED | OUTPATIENT
Start: 2018-05-14 | End: 2018-10-29 | Stop reason: SDUPTHER

## 2018-05-14 RX ORDER — CALCIUM CARBONATE 500(1250)
1 TABLET ORAL 3 TIMES DAILY
COMMUNITY
Start: 2017-11-24 | End: 2018-05-14 | Stop reason: SDUPTHER

## 2018-05-14 RX ORDER — LEVOTHYROXINE SODIUM 125 UG/1
125 CAPSULE ORAL DAILY
Qty: 30 CAPSULE | Refills: 0 | Status: SHIPPED | OUTPATIENT
Start: 2018-05-14 | End: 2018-09-10 | Stop reason: SDUPTHER

## 2018-05-14 RX ORDER — LEVOCETIRIZINE DIHYDROCHLORIDE 5 MG/1
1 TABLET, FILM COATED ORAL DAILY
COMMUNITY
Start: 2017-03-09 | End: 2018-12-12 | Stop reason: SDUPTHER

## 2018-05-14 RX ORDER — DIPHENOXYLATE HYDROCHLORIDE AND ATROPINE SULFATE 2.5; .025 MG/1; MG/1
1 TABLET ORAL DAILY
Refills: 3 | COMMUNITY
Start: 2018-03-14 | End: 2018-05-14 | Stop reason: SDUPTHER

## 2018-05-14 RX ORDER — HALOPERIDOL 2 MG/1
TABLET ORAL
COMMUNITY
End: 2019-08-28

## 2018-05-15 DIAGNOSIS — K59.00 CONSTIPATION, UNSPECIFIED CONSTIPATION TYPE: ICD-10-CM

## 2018-05-15 RX ORDER — PSYLLIUM HUSK 3 G/5.8 G
POWDER (GRAM) ORAL DAILY
Qty: 283 G | Refills: 3 | Status: SHIPPED | OUTPATIENT
Start: 2018-05-15 | End: 2019-08-28

## 2018-06-12 DIAGNOSIS — I10 HYPERTENSION, UNSPECIFIED TYPE: ICD-10-CM

## 2018-06-12 DIAGNOSIS — E08.00 DIABETES MELLITUS DUE TO UNDERLYING CONDITION WITH HYPEROSMOLARITY WITHOUT COMA, WITHOUT LONG-TERM CURRENT USE OF INSULIN (HCC): ICD-10-CM

## 2018-06-12 DIAGNOSIS — G62.9 NEUROPATHY: ICD-10-CM

## 2018-06-12 DIAGNOSIS — R56.9 SEIZURE (HCC): ICD-10-CM

## 2018-06-12 DIAGNOSIS — E55.9 VITAMIN D DEFICIENCY: ICD-10-CM

## 2018-06-12 DIAGNOSIS — E78.5 HYPERLIPIDEMIA, UNSPECIFIED HYPERLIPIDEMIA TYPE: ICD-10-CM

## 2018-06-12 RX ORDER — DIPHENOXYLATE HYDROCHLORIDE AND ATROPINE SULFATE 2.5; .025 MG/1; MG/1
1 TABLET ORAL DAILY
Qty: 90 TABLET | Refills: 0 | Status: SHIPPED | OUTPATIENT
Start: 2018-06-12 | End: 2018-08-13 | Stop reason: SDUPTHER

## 2018-06-12 RX ORDER — MELATONIN
1000 DAILY
Qty: 90 TABLET | Refills: 0 | Status: SHIPPED | OUTPATIENT
Start: 2018-06-12 | End: 2018-08-13 | Stop reason: SDUPTHER

## 2018-06-12 RX ORDER — LISINOPRIL 10 MG/1
10 TABLET ORAL DAILY
Qty: 90 TABLET | Refills: 0 | Status: SHIPPED | OUTPATIENT
Start: 2018-06-12 | End: 2018-08-13 | Stop reason: SDUPTHER

## 2018-06-12 RX ORDER — CALCIUM CARBONATE 500(1250)
1 TABLET ORAL 3 TIMES DAILY
Qty: 270 TABLET | Refills: 0 | Status: SHIPPED | OUTPATIENT
Start: 2018-06-12 | End: 2018-08-13 | Stop reason: SDUPTHER

## 2018-06-12 RX ORDER — SIMVASTATIN 40 MG
40 TABLET ORAL DAILY
Qty: 90 TABLET | Refills: 0 | Status: SHIPPED | OUTPATIENT
Start: 2018-06-12 | End: 2018-08-13 | Stop reason: SDUPTHER

## 2018-07-14 DIAGNOSIS — K59.04 CHRONIC IDIOPATHIC CONSTIPATION: ICD-10-CM

## 2018-07-16 RX ORDER — DOCUSATE SODIUM 100 MG/1
100 CAPSULE, LIQUID FILLED ORAL 2 TIMES DAILY
Qty: 60 CAPSULE | Refills: 3 | Status: SHIPPED | OUTPATIENT
Start: 2018-07-16 | End: 2018-12-05 | Stop reason: SDUPTHER

## 2018-07-19 ENCOUNTER — APPOINTMENT (OUTPATIENT)
Dept: LAB | Facility: CLINIC | Age: 53
End: 2018-07-19
Payer: MEDICARE

## 2018-07-19 ENCOUNTER — TRANSCRIBE ORDERS (OUTPATIENT)
Dept: LAB | Facility: CLINIC | Age: 53
End: 2018-07-19

## 2018-07-19 DIAGNOSIS — G40.909 NONINTRACTABLE EPILEPSY WITHOUT STATUS EPILEPTICUS, UNSPECIFIED EPILEPSY TYPE (HCC): Primary | ICD-10-CM

## 2018-07-19 DIAGNOSIS — G40.909 NONINTRACTABLE EPILEPSY WITHOUT STATUS EPILEPTICUS, UNSPECIFIED EPILEPSY TYPE (HCC): ICD-10-CM

## 2018-07-19 DIAGNOSIS — F79 INTELLECTUAL DISABILITY: ICD-10-CM

## 2018-07-19 LAB
ALBUMIN SERPL BCP-MCNC: 3.6 G/DL (ref 3.5–5)
ALP SERPL-CCNC: 43 U/L (ref 46–116)
ALT SERPL W P-5'-P-CCNC: 27 U/L (ref 12–78)
ANION GAP SERPL CALCULATED.3IONS-SCNC: 7 MMOL/L (ref 4–13)
AST SERPL W P-5'-P-CCNC: 20 U/L (ref 5–45)
BILIRUB SERPL-MCNC: 0.27 MG/DL (ref 0.2–1)
BUN SERPL-MCNC: 21 MG/DL (ref 5–25)
CALCIUM SERPL-MCNC: 9.2 MG/DL (ref 8.3–10.1)
CHLORIDE SERPL-SCNC: 106 MMOL/L (ref 100–108)
CO2 SERPL-SCNC: 27 MMOL/L (ref 21–32)
CREAT SERPL-MCNC: 0.61 MG/DL (ref 0.6–1.3)
ERYTHROCYTE [DISTWIDTH] IN BLOOD BY AUTOMATED COUNT: 14.2 % (ref 11.6–15.1)
GFR SERPL CREATININE-BSD FRML MDRD: 116 ML/MIN/1.73SQ M
GLUCOSE P FAST SERPL-MCNC: 94 MG/DL (ref 65–99)
HCT VFR BLD AUTO: 43.6 % (ref 36.5–49.3)
HGB BLD-MCNC: 14.4 G/DL (ref 12–17)
MCH RBC QN AUTO: 32.1 PG (ref 26.8–34.3)
MCHC RBC AUTO-ENTMCNC: 33 G/DL (ref 31.4–37.4)
MCV RBC AUTO: 97 FL (ref 82–98)
PLATELET # BLD AUTO: 94 THOUSANDS/UL (ref 149–390)
PMV BLD AUTO: 13 FL (ref 8.9–12.7)
POTASSIUM SERPL-SCNC: 3.9 MMOL/L (ref 3.5–5.3)
PROT SERPL-MCNC: 7.2 G/DL (ref 6.4–8.2)
RBC # BLD AUTO: 4.48 MILLION/UL (ref 3.88–5.62)
SODIUM SERPL-SCNC: 140 MMOL/L (ref 136–145)
VALPROATE SERPL-MCNC: 118 UG/ML (ref 50–100)
WBC # BLD AUTO: 4.96 THOUSAND/UL (ref 4.31–10.16)

## 2018-07-19 PROCEDURE — 85027 COMPLETE CBC AUTOMATED: CPT

## 2018-07-19 PROCEDURE — 80164 ASSAY DIPROPYLACETIC ACD TOT: CPT

## 2018-07-19 PROCEDURE — 80053 COMPREHEN METABOLIC PANEL: CPT

## 2018-07-19 PROCEDURE — 36415 COLL VENOUS BLD VENIPUNCTURE: CPT

## 2018-08-13 DIAGNOSIS — E78.5 HYPERLIPIDEMIA, UNSPECIFIED HYPERLIPIDEMIA TYPE: ICD-10-CM

## 2018-08-13 DIAGNOSIS — E53.8 B12 DEFICIENCY: ICD-10-CM

## 2018-08-13 DIAGNOSIS — E55.9 VITAMIN D DEFICIENCY: ICD-10-CM

## 2018-08-13 DIAGNOSIS — E08.00 DIABETES MELLITUS DUE TO UNDERLYING CONDITION WITH HYPEROSMOLARITY WITHOUT COMA, WITHOUT LONG-TERM CURRENT USE OF INSULIN (HCC): ICD-10-CM

## 2018-08-13 DIAGNOSIS — L21.9 SEBORRHEIC DERMATITIS OF SCALP: ICD-10-CM

## 2018-08-13 DIAGNOSIS — G62.9 NEUROPATHY: ICD-10-CM

## 2018-08-13 DIAGNOSIS — I10 HYPERTENSION, UNSPECIFIED TYPE: ICD-10-CM

## 2018-08-13 DIAGNOSIS — R56.9 SEIZURE (HCC): ICD-10-CM

## 2018-08-13 RX ORDER — DIPHENOXYLATE HYDROCHLORIDE AND ATROPINE SULFATE 2.5; .025 MG/1; MG/1
1 TABLET ORAL DAILY
Qty: 90 TABLET | Refills: 1 | Status: SHIPPED | OUTPATIENT
Start: 2018-08-13 | End: 2018-12-12 | Stop reason: SDUPTHER

## 2018-08-13 RX ORDER — CALCIUM CARBONATE 500(1250)
1 TABLET ORAL 3 TIMES DAILY
Qty: 270 TABLET | Refills: 1 | Status: SHIPPED | OUTPATIENT
Start: 2018-08-13 | End: 2018-12-12 | Stop reason: SDUPTHER

## 2018-08-13 RX ORDER — MELATONIN
1000 DAILY
Qty: 90 TABLET | Refills: 1 | Status: SHIPPED | OUTPATIENT
Start: 2018-08-13 | End: 2018-12-12 | Stop reason: SDUPTHER

## 2018-08-13 RX ORDER — KETOCONAZOLE 20 MG/ML
SHAMPOO TOPICAL
Qty: 120 ML | Refills: 3 | Status: SHIPPED | OUTPATIENT
Start: 2018-08-13 | End: 2019-06-07 | Stop reason: SDUPTHER

## 2018-08-13 RX ORDER — LANOLIN ALCOHOL/MO/W.PET/CERES
CREAM (GRAM) TOPICAL
Qty: 30 TABLET | Refills: 1 | Status: SHIPPED | OUTPATIENT
Start: 2018-08-13 | End: 2018-12-12 | Stop reason: SDUPTHER

## 2018-08-13 RX ORDER — SIMVASTATIN 40 MG
40 TABLET ORAL DAILY
Qty: 90 TABLET | Refills: 1 | Status: SHIPPED | OUTPATIENT
Start: 2018-08-13 | End: 2018-12-12 | Stop reason: SDUPTHER

## 2018-08-13 RX ORDER — LISINOPRIL 10 MG/1
10 TABLET ORAL DAILY
Qty: 90 TABLET | Refills: 1 | Status: SHIPPED | OUTPATIENT
Start: 2018-08-13 | End: 2018-12-12 | Stop reason: SDUPTHER

## 2018-08-20 DIAGNOSIS — R56.9 SEIZURE (HCC): Primary | ICD-10-CM

## 2018-08-20 RX ORDER — ZONISAMIDE 100 MG/1
CAPSULE ORAL
Qty: 180 CAPSULE | Refills: 1 | Status: SHIPPED | OUTPATIENT
Start: 2018-08-20 | End: 2018-10-29 | Stop reason: SDUPTHER

## 2018-09-10 DIAGNOSIS — E03.8 HYPOTHYROIDISM DUE TO HASHIMOTO'S THYROIDITIS: ICD-10-CM

## 2018-09-10 DIAGNOSIS — E06.3 HYPOTHYROIDISM DUE TO HASHIMOTO'S THYROIDITIS: ICD-10-CM

## 2018-09-11 DIAGNOSIS — E06.3 HYPOTHYROIDISM DUE TO HASHIMOTO'S THYROIDITIS: ICD-10-CM

## 2018-09-11 DIAGNOSIS — E03.8 HYPOTHYROIDISM DUE TO HASHIMOTO'S THYROIDITIS: ICD-10-CM

## 2018-09-11 RX ORDER — LEVOTHYROXINE SODIUM 125 UG/1
125 CAPSULE ORAL DAILY
Qty: 90 CAPSULE | Refills: 0 | Status: SHIPPED | OUTPATIENT
Start: 2018-09-11 | End: 2018-09-11 | Stop reason: SDUPTHER

## 2018-09-11 RX ORDER — LEVOTHYROXINE SODIUM 0.12 MG/1
125 TABLET ORAL DAILY
Qty: 90 TABLET | Refills: 1 | Status: SHIPPED | OUTPATIENT
Start: 2018-09-11 | End: 2018-12-12 | Stop reason: SDUPTHER

## 2018-09-13 ENCOUNTER — TRANSCRIBE ORDERS (OUTPATIENT)
Dept: ADMINISTRATIVE | Facility: HOSPITAL | Age: 53
End: 2018-09-13

## 2018-09-13 DIAGNOSIS — G40.909 NONINTRACTABLE EPILEPSY WITHOUT STATUS EPILEPTICUS, UNSPECIFIED EPILEPSY TYPE (HCC): Primary | ICD-10-CM

## 2018-09-13 DIAGNOSIS — D69.6 THROMBOCYTOPENIA (HCC): Primary | ICD-10-CM

## 2018-10-01 ENCOUNTER — TRANSCRIBE ORDERS (OUTPATIENT)
Dept: LAB | Facility: CLINIC | Age: 53
End: 2018-10-01

## 2018-10-01 ENCOUNTER — APPOINTMENT (OUTPATIENT)
Dept: LAB | Facility: CLINIC | Age: 53
End: 2018-10-01
Payer: MEDICARE

## 2018-10-01 DIAGNOSIS — D69.6 THROMBOCYTOPENIA (HCC): ICD-10-CM

## 2018-10-01 LAB
BASOPHILS # BLD AUTO: 0.03 THOUSANDS/ΜL (ref 0–0.1)
BASOPHILS NFR BLD AUTO: 1 % (ref 0–1)
EOSINOPHIL # BLD AUTO: 0.1 THOUSAND/ΜL (ref 0–0.61)
EOSINOPHIL NFR BLD AUTO: 2 % (ref 0–6)
ERYTHROCYTE [DISTWIDTH] IN BLOOD BY AUTOMATED COUNT: 14.5 % (ref 11.6–15.1)
HCT VFR BLD AUTO: 42.1 % (ref 36.5–49.3)
HGB BLD-MCNC: 13.8 G/DL (ref 12–17)
IMM GRANULOCYTES # BLD AUTO: 0.03 THOUSAND/UL (ref 0–0.2)
IMM GRANULOCYTES NFR BLD AUTO: 1 % (ref 0–2)
LYMPHOCYTES # BLD AUTO: 3.09 THOUSANDS/ΜL (ref 0.6–4.47)
LYMPHOCYTES NFR BLD AUTO: 51 % (ref 14–44)
MCH RBC QN AUTO: 32.9 PG (ref 26.8–34.3)
MCHC RBC AUTO-ENTMCNC: 32.8 G/DL (ref 31.4–37.4)
MCV RBC AUTO: 100 FL (ref 82–98)
MONOCYTES # BLD AUTO: 0.63 THOUSAND/ΜL (ref 0.17–1.22)
MONOCYTES NFR BLD AUTO: 11 % (ref 4–12)
NEUTROPHILS # BLD AUTO: 1.95 THOUSANDS/ΜL (ref 1.85–7.62)
NEUTS SEG NFR BLD AUTO: 34 % (ref 43–75)
NRBC BLD AUTO-RTO: 0 /100 WBCS
PLATELET # BLD AUTO: 126 THOUSANDS/UL (ref 149–390)
PMV BLD AUTO: 12.9 FL (ref 8.9–12.7)
RBC # BLD AUTO: 4.2 MILLION/UL (ref 3.88–5.62)
WBC # BLD AUTO: 5.83 THOUSAND/UL (ref 4.31–10.16)

## 2018-10-01 PROCEDURE — 85025 COMPLETE CBC W/AUTO DIFF WBC: CPT

## 2018-10-01 PROCEDURE — 36415 COLL VENOUS BLD VENIPUNCTURE: CPT

## 2018-10-29 DIAGNOSIS — G62.9 NEUROPATHY: ICD-10-CM

## 2018-10-29 DIAGNOSIS — R56.9 SEIZURE (HCC): ICD-10-CM

## 2018-10-29 RX ORDER — ZONISAMIDE 100 MG/1
CAPSULE ORAL
Qty: 180 CAPSULE | Refills: 1 | Status: SHIPPED | OUTPATIENT
Start: 2018-10-29 | End: 2019-05-01 | Stop reason: SDUPTHER

## 2018-10-29 RX ORDER — GABAPENTIN 100 MG/1
CAPSULE ORAL
Qty: 120 CAPSULE | Refills: 1 | Status: SHIPPED | OUTPATIENT
Start: 2018-10-29 | End: 2019-05-01 | Stop reason: SDUPTHER

## 2018-11-16 DIAGNOSIS — G23.2 PARKINSON'S PLUS SYNDROME (HCC): ICD-10-CM

## 2018-11-16 RX ORDER — HYDROXYZINE PAMOATE 25 MG/1
CAPSULE ORAL
Qty: 108 CAPSULE | Refills: 4 | Status: SHIPPED | OUTPATIENT
Start: 2018-11-16 | End: 2019-03-07 | Stop reason: SDUPTHER

## 2018-12-05 DIAGNOSIS — K59.04 CHRONIC IDIOPATHIC CONSTIPATION: ICD-10-CM

## 2018-12-05 RX ORDER — DOCUSATE SODIUM 100 MG/1
CAPSULE, LIQUID FILLED ORAL
Qty: 56 CAPSULE | Refills: 0 | Status: SHIPPED | OUTPATIENT
Start: 2018-12-05 | End: 2019-01-14 | Stop reason: SDUPTHER

## 2018-12-12 ENCOUNTER — OFFICE VISIT (OUTPATIENT)
Dept: FAMILY MEDICINE CLINIC | Facility: CLINIC | Age: 53
End: 2018-12-12
Payer: MEDICARE

## 2018-12-12 VITALS
SYSTOLIC BLOOD PRESSURE: 118 MMHG | WEIGHT: 179 LBS | RESPIRATION RATE: 18 BRPM | DIASTOLIC BLOOD PRESSURE: 70 MMHG | OXYGEN SATURATION: 97 % | HEIGHT: 70 IN | HEART RATE: 68 BPM | BODY MASS INDEX: 25.62 KG/M2 | TEMPERATURE: 98.3 F

## 2018-12-12 DIAGNOSIS — J30.9 ALLERGIC RHINITIS, UNSPECIFIED SEASONALITY, UNSPECIFIED TRIGGER: ICD-10-CM

## 2018-12-12 DIAGNOSIS — R56.9 SEIZURE (HCC): ICD-10-CM

## 2018-12-12 DIAGNOSIS — E08.00 DIABETES MELLITUS DUE TO UNDERLYING CONDITION WITH HYPEROSMOLARITY WITHOUT COMA, WITHOUT LONG-TERM CURRENT USE OF INSULIN (HCC): ICD-10-CM

## 2018-12-12 DIAGNOSIS — E06.3 HYPOTHYROIDISM DUE TO HASHIMOTO'S THYROIDITIS: ICD-10-CM

## 2018-12-12 DIAGNOSIS — E53.8 VITAMIN B12 DEFICIENCY: ICD-10-CM

## 2018-12-12 DIAGNOSIS — I10 HYPERTENSION, UNSPECIFIED TYPE: ICD-10-CM

## 2018-12-12 DIAGNOSIS — E55.9 VITAMIN D DEFICIENCY: ICD-10-CM

## 2018-12-12 DIAGNOSIS — E03.8 HYPOTHYROIDISM DUE TO HASHIMOTO'S THYROIDITIS: ICD-10-CM

## 2018-12-12 DIAGNOSIS — E53.8 B12 DEFICIENCY: ICD-10-CM

## 2018-12-12 DIAGNOSIS — G62.9 NEUROPATHY: ICD-10-CM

## 2018-12-12 DIAGNOSIS — Z23 FLU VACCINE NEED: ICD-10-CM

## 2018-12-12 DIAGNOSIS — E03.9 HYPOTHYROIDISM, UNSPECIFIED TYPE: ICD-10-CM

## 2018-12-12 DIAGNOSIS — Z12.5 SCREENING FOR PROSTATE CANCER: ICD-10-CM

## 2018-12-12 DIAGNOSIS — Z12.11 SCREENING FOR COLON CANCER: ICD-10-CM

## 2018-12-12 DIAGNOSIS — E78.5 HYPERLIPIDEMIA, UNSPECIFIED HYPERLIPIDEMIA TYPE: ICD-10-CM

## 2018-12-12 DIAGNOSIS — E11.9 CONTROLLED TYPE 2 DIABETES MELLITUS WITHOUT COMPLICATION, WITHOUT LONG-TERM CURRENT USE OF INSULIN (HCC): Primary | ICD-10-CM

## 2018-12-12 LAB — SL AMB POCT HEMOGLOBIN AIC: 6.2

## 2018-12-12 PROCEDURE — 83036 HEMOGLOBIN GLYCOSYLATED A1C: CPT | Performed by: FAMILY MEDICINE

## 2018-12-12 PROCEDURE — 99213 OFFICE O/P EST LOW 20 MIN: CPT | Performed by: FAMILY MEDICINE

## 2018-12-12 RX ORDER — LANOLIN ALCOHOL/MO/W.PET/CERES
CREAM (GRAM) TOPICAL
Qty: 180 TABLET | Refills: 0 | Status: SHIPPED | OUTPATIENT
Start: 2018-12-12 | End: 2019-11-06 | Stop reason: SDUPTHER

## 2018-12-12 RX ORDER — LEVOTHYROXINE SODIUM 0.12 MG/1
125 TABLET ORAL DAILY
Qty: 90 TABLET | Refills: 1 | Status: SHIPPED | OUTPATIENT
Start: 2018-12-12 | End: 2019-08-16 | Stop reason: SDUPTHER

## 2018-12-12 RX ORDER — SIMVASTATIN 40 MG
40 TABLET ORAL DAILY
Qty: 90 TABLET | Refills: 1 | Status: SHIPPED | OUTPATIENT
Start: 2018-12-12 | End: 2019-04-01 | Stop reason: SDUPTHER

## 2018-12-12 RX ORDER — LORATADINE 10 MG/1
10 TABLET ORAL DAILY
Qty: 90 TABLET | Refills: 1 | Status: SHIPPED | OUTPATIENT
Start: 2018-12-12 | End: 2019-03-14

## 2018-12-12 RX ORDER — MELATONIN
1000 DAILY
Qty: 90 TABLET | Refills: 1 | Status: SHIPPED | OUTPATIENT
Start: 2018-12-12 | End: 2019-06-07 | Stop reason: SDUPTHER

## 2018-12-12 RX ORDER — LISINOPRIL 10 MG/1
10 TABLET ORAL DAILY
Qty: 90 TABLET | Refills: 0 | Status: SHIPPED | OUTPATIENT
Start: 2018-12-12 | End: 2019-09-17 | Stop reason: SDUPTHER

## 2018-12-12 RX ORDER — CALCIUM CARBONATE 500(1250)
1 TABLET ORAL 3 TIMES DAILY
Qty: 270 TABLET | Refills: 0 | Status: SHIPPED | OUTPATIENT
Start: 2018-12-12 | End: 2019-06-07 | Stop reason: SDUPTHER

## 2018-12-12 RX ORDER — DIPHENOXYLATE HYDROCHLORIDE AND ATROPINE SULFATE 2.5; .025 MG/1; MG/1
1 TABLET ORAL DAILY
Qty: 90 TABLET | Refills: 0 | Status: SHIPPED | OUTPATIENT
Start: 2018-12-12 | End: 2019-06-07 | Stop reason: SDUPTHER

## 2018-12-12 RX ORDER — LEVOCETIRIZINE DIHYDROCHLORIDE 5 MG/1
5 TABLET, FILM COATED ORAL DAILY
Qty: 90 TABLET | Refills: 1 | Status: SHIPPED | OUTPATIENT
Start: 2018-12-12 | End: 2019-04-19 | Stop reason: SDUPTHER

## 2018-12-12 NOTE — PROGRESS NOTES
Assessment/Plan:     Diagnoses and all orders for this visit:    Controlled type 2 diabetes mellitus without complication, without long-term current use of insulin (HCC)  -     POCT hemoglobin A1c  -     CBC and differential; Future  -     Comprehensive metabolic panel; Future  -     UA w Reflex to Microscopic w Reflex to Culture -Lab Collect  -     Microalbumin / creatinine urine ratio    Hypothyroidism, unspecified type  -     TSH, 3rd generation with Free T4 reflex; Future    Hypertension, unspecified type  -     lisinopril (ZESTRIL) 10 mg tablet; Take 1 tablet (10 mg total) by mouth daily  -     multivitamin (THERAGRAN) TABS; Take 1 tablet by mouth daily    Hyperlipidemia, unspecified hyperlipidemia type  -     Lipid Panel with Direct LDL reflex; Future  -     calcium carbonate (OYSTER SHELL,OSCAL) 500 mg; Take 1 tablet by mouth 3 (three) times a day  -     simvastatin (ZOCOR) 40 mg tablet; Take 1 tablet (40 mg total) by mouth daily  -     multivitamin (THERAGRAN) TABS; Take 1 tablet by mouth daily    Vitamin B12 deficiency  -     Vitamin B12; Future    Vitamin D deficiency  -     Vitamin D 1,25 dihydroxy; Future  -     cholecalciferol (VITAMIN D3) 1,000 units tablet; Take 1 tablet (1,000 Units total) by mouth daily    Flu vaccine need  -     influenza vaccine, 3599-3092, quadrivalent, recombinant, PF, 0 5 mL, for patients 18 yr+ (FLUBLOK)    Screening for prostate cancer  -     PSA Total, Diagnostic; Future    Diabetes mellitus due to underlying condition with hyperosmolarity without coma, without long-term current use of insulin (HCC)  -     calcium carbonate (OYSTER SHELL,OSCAL) 500 mg; Take 1 tablet by mouth 3 (three) times a day  -     metFORMIN (GLUCOPHAGE) 1000 MG tablet;  Take 1 tablet (1,000 mg total) by mouth 2 (two) times a day with meals  -     multivitamin (THERAGRAN) TABS; Take 1 tablet by mouth daily    B12 deficiency  -     cyanocobalamin (VITAMIN B-12) 1,000 mcg tablet; TAKE 1 TABLET BY MOUTH EVERY OTHER DAY (8 AM)    Hypothyroidism due to Hashimoto's thyroiditis  -     levothyroxine 125 mcg tablet; Take 1 tablet (125 mcg total) by mouth daily    Seizure (HCC)  -     multivitamin (THERAGRAN) TABS; Take 1 tablet by mouth daily    Neuropathy  -     multivitamin (THERAGRAN) TABS; Take 1 tablet by mouth daily    Allergic rhinitis, unspecified seasonality, unspecified trigger  -     levocetirizine (XYZAL) 5 MG tablet; Take 1 tablet (5 mg total) by mouth daily  -     loratadine (CLARITIN) 10 mg tablet; Take 1 tablet (10 mg total) by mouth daily    Screening for colon cancer  -     Ambulatory referral to Gastroenterology; Future      Discussion/Plan:  Type 2 DM - A1C 6 2  Continue metformin 1000mg BID  Continue healthy diet and exercise as able  F/U eye doctor  Hypertension - controlled  Continue current medications  Screening for prostate cancer - prostate exam completed without abnormality  PSA ordered  Screening for colon cancer - guaiac negative stool  Referral GI  For colonoscopy  Cerebral palsy - continue follow up with neurology as scheduled  Maintenance medications renewed  Routine labs ordered  Flu shot in office today  RTC 6 months or sooner if needed  Subjective:      Patient ID: Denis Favre is a 48 y o  male  Chief Complaint   Patient presents with    Physical Exam     A1C, he needs a flu shot and prostate exam       Patient is a 48year old male with past medical history as noted below, is a  poor historian and accompanied by caregiver  He has been doing well, no complaints or concerns today  He has been eating and sleeping well, no elimination concerns  He has history of cerebral palsy, follows neurology routinely and symptoms controlled on current medications  He is a Type 2 diabetic, caregiver states sugars are running good  He is eating fairly healthy and is active  He is established with opthalmology  Last A1C was 6 2 in 5/17, he is due for A1C   He is also due for prostate exam and flu shot  The following portions of the patient's history were reviewed and updated as appropriate: allergies, current medications, past family history, past medical history, past social history, past surgical history and problem list     Patient Active Problem List   Diagnosis    Cataract    Cerebral palsy ([facility] )    Diabetes mellitus type 2, controlled ([facility] )    Generalized tonic clonic epilepsy ([facility] )    Hyperlipidemia    Hypertension    Hypothyroidism    Osteoporosis    Scoliosis    Vitamin B12 deficiency    Vitamin D deficiency     Current Outpatient Prescriptions on File Prior to Visit   Medication Sig Dispense Refill    benztropine (COGENTIN) 0 5 mg tablet Take 1 tablet (0 5 mg total) by mouth 2 (two) times a day 60 tablet 3    divalproex sodium (DEPAKOTE ER) 500 mg 24 hr tablet Take 2 tablets in the AM and 3 tablets at night  450 tablet 0     MG capsule TAKE 1 CAPSULE BY MOUTH TWICE A DAY (8AM,8PM) 56 capsule 0    gabapentin (NEURONTIN) 100 mg capsule Take 200mg in the AM, 100mg at 2pm and 8pm  120 capsule 1    haloperidol (HALDOL) 2 mg tablet Take by mouth      hydrOXYzine pamoate (VISTARIL) 25 mg capsule TAKE 2 CAPSULES (50MG) BY MOUTH 3 TIMES A DAY (8AM,2PM,8PM) 108 capsule 4    ketoconazole (NIZORAL) 2 % shampoo Wash hair and sideburns at least 3 times per week   120 mL 3    REGULOID 58 6 % powder Take by mouth daily 283 g 3    SEROQUEL  MG 24 hr tablet TAKE 1 TABLET BY MOUTH TWICE DAILY (8 AM + 2 PM)  4    SEROQUEL  MG 24 hr tablet TAKE 1 TABLET BY MOUTH DAILY AT BEDTIME (8 PM)   DX  ANTIPSYCHOTIC  4    zonisamide (ZONEGRAN) 100 mg capsule Take 6 capsules by mouth daily at bedtime (8pm) 180 capsule 1    [DISCONTINUED] calcium carbonate (OYSTER SHELL,OSCAL) 500 mg Take 1 tablet by mouth 3 (three) times a day 270 tablet 1    [DISCONTINUED] cholecalciferol (VITAMIN D3) 1,000 units tablet Take 1 tablet (1,000 Units total) by mouth daily 90 tablet 1    [DISCONTINUED] cyanocobalamin (VITAMIN B-12) 1,000 mcg tablet TAKE 1 TABLET BY MOUTH EVERY OTHER DAY (8 AM) 30 tablet 1    [DISCONTINUED] levocetirizine (XYZAL) 5 MG tablet Take 1 tablet by mouth daily      [DISCONTINUED] levothyroxine 125 mcg tablet Take 1 tablet (125 mcg total) by mouth daily 90 tablet 1    [DISCONTINUED] lisinopril (ZESTRIL) 10 mg tablet Take 1 tablet (10 mg total) by mouth daily 90 tablet 1    [DISCONTINUED] loratadine (CLARITIN) 10 mg tablet Take by mouth      [DISCONTINUED] metFORMIN (GLUCOPHAGE) 1000 MG tablet Take 1 tablet by mouth 2 times daily  (8am+8pm) 180 tablet 1    [DISCONTINUED] multivitamin (THERAGRAN) TABS Take 1 tablet by mouth daily 90 tablet 1    [DISCONTINUED] simvastatin (ZOCOR) 40 mg tablet Take 1 tablet (40 mg total) by mouth daily 90 tablet 1     No current facility-administered medications on file prior to visit  Review of Systems   Constitutional: Negative  HENT: Negative  Respiratory: Negative  Cardiovascular: Negative  Gastrointestinal: Negative  Genitourinary: Negative  Musculoskeletal: Negative  Psychiatric/Behavioral: Negative  Objective:      /70   Pulse 68   Temp 98 3 °F (36 8 °C)   Resp 18   Ht 5' 10" (1 778 m)   Wt 81 2 kg (179 lb)   SpO2 97%   BMI 25 68 kg/m²          Physical Exam   Constitutional: He appears well-developed and well-nourished  HENT:   Head: Normocephalic and atraumatic  Right Ear: Tympanic membrane, external ear and ear canal normal    Left Ear: Tympanic membrane, external ear and ear canal normal    Nose: Nose normal    Mouth/Throat: Oropharynx is clear and moist    Eyes: Pupils are equal, round, and reactive to light  Conjunctivae are normal    Neck: Neck supple  No thyromegaly present  Cardiovascular: Normal rate, regular rhythm, normal heart sounds and intact distal pulses  Pulmonary/Chest: Effort normal and breath sounds normal    Abdominal: Soft   Bowel sounds are normal    Genitourinary: Rectum normal and prostate normal  Rectal exam shows no external hemorrhoid, no internal hemorrhoid, no fissure, no mass, no tenderness, anal tone normal and guaiac negative stool  Prostate is not enlarged and not tender  Lymphadenopathy:     He has no cervical adenopathy  Neurological: He is alert  Oriented to person   Skin: Skin is warm and dry  Psychiatric: He has a normal mood and affect   His behavior is normal

## 2018-12-13 LAB — SL AMB POCT FECES OCC BLD: NEGATIVE

## 2018-12-26 DIAGNOSIS — G23.2 PARKINSON'S PLUS SYNDROME (HCC): ICD-10-CM

## 2018-12-26 RX ORDER — BENZTROPINE MESYLATE 0.5 MG/1
0.5 TABLET ORAL 2 TIMES DAILY
Qty: 180 TABLET | Refills: 0 | Status: SHIPPED | OUTPATIENT
Start: 2018-12-26 | End: 2019-03-07 | Stop reason: SDUPTHER

## 2019-01-07 ENCOUNTER — TELEPHONE (OUTPATIENT)
Dept: GASTROENTEROLOGY | Facility: AMBULARY SURGERY CENTER | Age: 54
End: 2019-01-07

## 2019-01-07 NOTE — TELEPHONE ENCOUNTER
01/07/19  Screened by: Chandrika Ray    Referring Provider     Pre- Screening: There is no height or weight on file to calculate BMI  Has patient been referred for a routine screening Colonoscopy? yes  Is the patient between 39-70 years old? yes    SCHEDULING STAFF   If the patient is between 45yrs-49yrs, please advise patient to confirm benefits/coverage with their insurance company for a routine screening colonoscopy, some insurance carriers will only cover at [phone] or older   If the patient is over 66years old, please schedule an office visit  Do you have any of the following symptoms? Have you had a coronary or vascular stent within the last year? no    Have you had a heart attack or stroke in the last 6 months? no    Have you had intestinal surgery in the last 3 months? no    Do you have problems with:     Do you use:     Have you been hospitalized in the last Month? no    Have you been diagnosed with a bleeding disorder or anemia? no    Have you had chest pain (angina) or breathing problems  (COPD) in the last 3 months? no    Do you have any difficulty walking up a flight of stairs? no    Have you had Kidney failure or insufficiency? no    Have you had heart valve surgery? no    Are you confined to a wheelchair? no    Do you take     Have you been diagnosed with Diabetes or are you taking any   Diabetic medications? yes    : If patient answers NO to medical questions, then schedule procedure  If patient answers YES to medical questions, then schedule office appointment  Previous Colonoscopy no  Date and Facility of last colonoscopy?      Comments: [facility]

## 2019-01-14 DIAGNOSIS — K59.04 CHRONIC IDIOPATHIC CONSTIPATION: ICD-10-CM

## 2019-01-14 RX ORDER — DOCUSATE SODIUM 100 MG/1
CAPSULE, LIQUID FILLED ORAL
Qty: 56 CAPSULE | Refills: 4 | Status: SHIPPED | OUTPATIENT
Start: 2019-01-14 | End: 2019-03-14

## 2019-01-18 ENCOUNTER — TELEPHONE (OUTPATIENT)
Dept: GASTROENTEROLOGY | Facility: CLINIC | Age: 54
End: 2019-01-18

## 2019-01-18 PROBLEM — Z12.11 SCREENING FOR COLON CANCER: Status: ACTIVE | Noted: 2019-01-18

## 2019-01-18 NOTE — TELEPHONE ENCOUNTER
SPOKE WITH CARETAKER Latia Weinre PATIENT IS COMING IN FOR PAPERWORK ON 1/30/19 FOR COLON BOOKED ON 2/6/19

## 2019-01-18 NOTE — TELEPHONE ENCOUNTER
----- Message from Jazmin Myrick MD sent at 1/18/2019 11:46 AM EST -----  pls direct schedule and block spot

## 2019-01-30 DIAGNOSIS — K59.00 CONSTIPATION, UNSPECIFIED CONSTIPATION TYPE: Primary | ICD-10-CM

## 2019-01-31 RX ORDER — DOCUSATE SODIUM 100 MG/1
100 CAPSULE, LIQUID FILLED ORAL 2 TIMES DAILY
Qty: 30 CAPSULE | Refills: 1 | Status: SHIPPED | OUTPATIENT
Start: 2019-01-31 | End: 2019-06-27 | Stop reason: SDUPTHER

## 2019-02-05 ENCOUNTER — ANESTHESIA EVENT (OUTPATIENT)
Dept: PERIOP | Facility: HOSPITAL | Age: 54
End: 2019-02-05

## 2019-02-06 ENCOUNTER — ANESTHESIA (OUTPATIENT)
Dept: PERIOP | Facility: HOSPITAL | Age: 54
End: 2019-02-06

## 2019-02-06 NOTE — ANESTHESIA PREPROCEDURE EVALUATION
Review of Systems/Medical History  Patient summary reviewed        Cardiovascular  Hyperlipidemia, Hypertension ,    Pulmonary  Negative pulmonary ROS        GI/Hepatic  Negative GI/hepatic ROS          Negative  ROS        Endo/Other  Diabetes , History of thyroid disease , hypothyroidism,      GYN  Negative gynecology ROS          Hematology  Negative hematology ROS      Musculoskeletal  Negative musculoskeletal ROS        Neurology  Seizures ,  Neuromuscular disease , cerebral palsy,    Psychology   Negative psychology ROS                   Anesthesia Plan  ASA Score- 3     Anesthesia Type- IV sedation with anesthesia with ASA Monitors  Additional Monitors:   Airway Plan:         Plan Factors-    Induction- intravenous  Postoperative Plan-     Informed Consent- Anesthetic plan and risks discussed with patient  I personally reviewed this patient with the CRNA  Discussed and agreed on the Anesthesia Plan with the CRNA  Colt Rivera

## 2019-02-20 ENCOUNTER — TELEPHONE (OUTPATIENT)
Dept: GASTROENTEROLOGY | Facility: CLINIC | Age: 54
End: 2019-02-20

## 2019-03-07 ENCOUNTER — TELEPHONE (OUTPATIENT)
Dept: GASTROENTEROLOGY | Facility: CLINIC | Age: 54
End: 2019-03-07

## 2019-03-07 DIAGNOSIS — G23.2 PARKINSON'S PLUS SYNDROME (HCC): ICD-10-CM

## 2019-03-07 RX ORDER — HYDROXYZINE PAMOATE 25 MG/1
CAPSULE ORAL
Qty: 168 CAPSULE | Refills: 1 | Status: SHIPPED | OUTPATIENT
Start: 2019-03-07 | End: 2019-04-19 | Stop reason: SDUPTHER

## 2019-03-07 RX ORDER — BENZTROPINE MESYLATE 0.5 MG/1
TABLET ORAL
Qty: 56 TABLET | Refills: 1 | Status: SHIPPED | OUTPATIENT
Start: 2019-03-07 | End: 2019-04-19 | Stop reason: SDUPTHER

## 2019-03-14 ENCOUNTER — TELEPHONE (OUTPATIENT)
Dept: OTHER | Facility: OTHER | Age: 54
End: 2019-03-14

## 2019-03-14 ENCOUNTER — OFFICE VISIT (OUTPATIENT)
Dept: FAMILY MEDICINE CLINIC | Facility: HOME HEALTHCARE | Age: 54
End: 2019-03-14
Payer: MEDICARE

## 2019-03-14 VITALS
DIASTOLIC BLOOD PRESSURE: 76 MMHG | TEMPERATURE: 98 F | BODY MASS INDEX: 25.2 KG/M2 | HEIGHT: 70 IN | HEART RATE: 89 BPM | WEIGHT: 176 LBS | OXYGEN SATURATION: 96 % | SYSTOLIC BLOOD PRESSURE: 122 MMHG | RESPIRATION RATE: 18 BRPM

## 2019-03-14 DIAGNOSIS — Z11.1 SCREENING-PULMONARY TB: ICD-10-CM

## 2019-03-14 DIAGNOSIS — J06.9 VIRAL URI WITH COUGH: Primary | ICD-10-CM

## 2019-03-14 DIAGNOSIS — B00.1 COLD SORE: ICD-10-CM

## 2019-03-14 PROCEDURE — 99213 OFFICE O/P EST LOW 20 MIN: CPT | Performed by: FAMILY MEDICINE

## 2019-03-14 RX ORDER — GUAIFENESIN 600 MG
1200 TABLET, EXTENDED RELEASE 12 HR ORAL 2 TIMES DAILY
Qty: 20 TABLET | Refills: 0 | Status: SHIPPED | OUTPATIENT
Start: 2019-03-14 | End: 2019-03-24

## 2019-03-14 RX ORDER — DOCOSANOL 100 MG/G
CREAM TOPICAL
Qty: 2 G | Refills: 0 | Status: SHIPPED | OUTPATIENT
Start: 2019-03-14 | End: 2019-08-28

## 2019-03-14 NOTE — PROGRESS NOTES
OFFICE VISIT  Kasandra Oconnell 48 y o  male MRN: [id number]      Assessment / Plan:  Diagnoses and all orders for this visit:    Viral URI with cough  -     guaiFENesin (MUCINEX) 600 mg 12 hr tablet; Take 2 tablets (1,200 mg total) by mouth 2 (two) times a day for 10 days    Cold sore  -     docosanol (ABREVA) 10 %; Apply topically 5 (five) times a day    Screening-pulmonary TB    ppd given, form completed to have read on saturday at urgent care  Reason For Visit / Chief Complaint  Chief Complaint   Patient presents with    Cough     for two days- also has cold sore and wants something for that    TB Test        HPI:  Emily Paulson is a 48 y o  male who presents today for cough, loose, non productive  Denies fever or chills,  APtteit is good, denies sob, chest pain  No other symptoms, cold sore present  Historical Information   No past medical history on file  No past surgical history on file  Social History   Social History     Substance and Sexual Activity   Alcohol Use No     Social History     Substance and Sexual Activity   Drug Use No     Social History     Tobacco Use   Smoking Status Never Smoker   Smokeless Tobacco Never Used     No family history on file      Meds/Allergies   Allergies   Allergen Reactions    Erythromycin     Penicillins        Meds:    Current Outpatient Medications:     benztropine (COGENTIN) 0 5 mg tablet, TAKE 1 TABLET BY MOUTH TWICE A DAY (8AM,8PM), Disp: 56 tablet, Rfl: 1    calcium carbonate (OYSTER SHELL,OSCAL) 500 mg, Take 1 tablet by mouth 3 (three) times a day, Disp: 270 tablet, Rfl: 0    cholecalciferol (VITAMIN D3) 1,000 units tablet, Take 1 tablet (1,000 Units total) by mouth daily, Disp: 90 tablet, Rfl: 1    cyanocobalamin (VITAMIN B-12) 1,000 mcg tablet, TAKE 1 TABLET BY MOUTH EVERY OTHER DAY (8 AM), Disp: 180 tablet, Rfl: 0    divalproex sodium (DEPAKOTE ER) 500 mg 24 hr tablet, Take 2 tablets in the AM and 3 tablets at night , Disp: 450 tablet, Rfl: 0    docosanol (ABREVA) 10 %, Apply topically 5 (five) times a day, Disp: 2 g, Rfl: 0    docusate sodium (COLACE) 100 mg capsule, Take 1 capsule (100 mg total) by mouth 2 (two) times a day, Disp: 30 capsule, Rfl: 1    gabapentin (NEURONTIN) 100 mg capsule, Take 200mg in the AM, 100mg at 2pm and 8pm , Disp: 120 capsule, Rfl: 1    guaiFENesin (MUCINEX) 600 mg 12 hr tablet, Take 2 tablets (1,200 mg total) by mouth 2 (two) times a day for 10 days, Disp: 20 tablet, Rfl: 0    haloperidol (HALDOL) 2 mg tablet, Take by mouth, Disp: , Rfl:     hydrOXYzine pamoate (VISTARIL) 25 mg capsule, TAKE 2 CAPSULES (50MG) BY MOUTH 3 TIMES A DAY (8AM,2PM,8PM), Disp: 168 capsule, Rfl: 1    ketoconazole (NIZORAL) 2 % shampoo, Wash hair and sideburns at least 3 times per week , Disp: 120 mL, Rfl: 3    levocetirizine (XYZAL) 5 MG tablet, Take 1 tablet (5 mg total) by mouth daily (Patient taking differently: Take 5 mg by mouth daily prn), Disp: 90 tablet, Rfl: 1    levothyroxine 125 mcg tablet, Take 1 tablet (125 mcg total) by mouth daily, Disp: 90 tablet, Rfl: 1    lisinopril (ZESTRIL) 10 mg tablet, Take 1 tablet (10 mg total) by mouth daily, Disp: 90 tablet, Rfl: 0    metFORMIN (GLUCOPHAGE) 1000 MG tablet, Take 1 tablet (1,000 mg total) by mouth 2 (two) times a day with meals, Disp: 180 tablet, Rfl: 1    multivitamin (THERAGRAN) TABS, Take 1 tablet by mouth daily, Disp: 90 tablet, Rfl: 0    REGULOID 58 6 % powder, Take by mouth daily, Disp: 283 g, Rfl: 3    SEROQUEL  MG 24 hr tablet, TAKE 1 TABLET BY MOUTH TWICE DAILY (8 AM + 2 PM), Disp: , Rfl: 4    SEROQUEL  MG 24 hr tablet, TAKE 1 TABLET BY MOUTH DAILY AT BEDTIME (8 PM)   DX  ANTIPSYCHOTIC, Disp: , Rfl: 4    simvastatin (ZOCOR) 40 mg tablet, Take 1 tablet (40 mg total) by mouth daily, Disp: 90 tablet, Rfl: 1    zonisamide (ZONEGRAN) 100 mg capsule, Take 6 capsules by mouth daily at bedtime (8pm), Disp: 180 capsule, Rfl: 1      REVIEW OF SYSTEMS  Review of Systems   Constitutional: Negative for appetite change, fatigue and fever  HENT: Negative for congestion, ear discharge, ear pain and postnasal drip  Eyes: Negative for pain, discharge, redness, itching and visual disturbance  Respiratory: Positive for cough  Negative for chest tightness, shortness of breath and wheezing  Cardiovascular: Negative for chest pain, palpitations and leg swelling  Gastrointestinal: Negative for abdominal distention, abdominal pain, blood in stool, diarrhea, nausea and vomiting  Endocrine: Negative for cold intolerance, heat intolerance, polydipsia, polyphagia and polyuria  Genitourinary: Negative for decreased urine volume, difficulty urinating, dysuria, frequency, hematuria, testicular pain and urgency  Musculoskeletal: Negative for arthralgias, back pain, myalgias, neck pain and neck stiffness  Skin: Negative for color change, pallor, rash and wound  Neurological: Negative for dizziness, light-headedness, numbness and headaches  Hematological: Negative for adenopathy  Does not bruise/bleed easily  Psychiatric/Behavioral: Negative for agitation, behavioral problems, self-injury, sleep disturbance and suicidal ideas  The patient is not nervous/anxious  Current Vitals:   Blood Pressure: 122/76 (03/14/19 1309)  Pulse: 89 (03/14/19 1309)  Temperature: 98 °F (36 7 °C) (03/14/19 1309)  Respirations: 18 (03/14/19 1309)  Height: 5' 10" (177 8 cm) (03/14/19 1309)  Weight - Scale: 79 8 kg (176 lb) (03/14/19 1309)  SpO2: 96 % (03/14/19 1309)  [unfilled]    PHYSICAL EXAMS:  Physical Exam   Constitutional: He is oriented to person, place, and time  He appears well-developed and well-nourished  HENT:   Head: Normocephalic and atraumatic  Right Ear: External ear normal    Left Ear: External ear normal    Nose: Nose normal    Mouth/Throat: Oropharynx is clear and moist    Eyes: Pupils are equal, round, and reactive to light   Conjunctivae are normal  Right eye exhibits no discharge  Left eye exhibits no discharge  Neck: Normal range of motion  Neck supple  No thyromegaly present  Cardiovascular: Normal rate, regular rhythm and normal heart sounds  Pulmonary/Chest: Effort normal and breath sounds normal    Abdominal: Soft  Bowel sounds are normal  He exhibits no distension  There is no tenderness  Musculoskeletal: Normal range of motion  He exhibits no edema, tenderness or deformity  Neurological: He is alert and oriented to person, place, and time  Skin: Skin is warm and dry  No rash noted  No erythema  Cold sore to left nares    Psychiatric: He has a normal mood and affect  His behavior is normal            Follow up at this office in 3 months     Counseling / Coordination of Care  Total floor / unit time spent today 20 minutes  Greater than 50% of total time was spent with the patient and / or family counseling and / or coordination of care

## 2019-03-14 NOTE — TELEPHONE ENCOUNTER
Pharmacy called with questions regarding Abreva instructions  Tried paging Adrian Counts @ 0720 and voicemail was full

## 2019-04-01 DIAGNOSIS — E78.5 HYPERLIPIDEMIA, UNSPECIFIED HYPERLIPIDEMIA TYPE: ICD-10-CM

## 2019-04-01 RX ORDER — SIMVASTATIN 40 MG
40 TABLET ORAL DAILY
Qty: 90 TABLET | Refills: 1 | Status: SHIPPED | OUTPATIENT
Start: 2019-04-01 | End: 2019-04-25 | Stop reason: SDUPTHER

## 2019-04-19 DIAGNOSIS — J30.9 ALLERGIC RHINITIS, UNSPECIFIED SEASONALITY, UNSPECIFIED TRIGGER: ICD-10-CM

## 2019-04-19 DIAGNOSIS — G23.2 PARKINSON'S PLUS SYNDROME (HCC): ICD-10-CM

## 2019-04-19 RX ORDER — BENZTROPINE MESYLATE 0.5 MG/1
TABLET ORAL
Qty: 56 TABLET | Refills: 5 | Status: SHIPPED | OUTPATIENT
Start: 2019-04-19 | End: 2019-10-11 | Stop reason: SDUPTHER

## 2019-04-19 RX ORDER — LEVOCETIRIZINE DIHYDROCHLORIDE 5 MG/1
5 TABLET, FILM COATED ORAL DAILY
Qty: 90 TABLET | Refills: 1 | Status: SHIPPED | OUTPATIENT
Start: 2019-04-19 | End: 2019-05-15 | Stop reason: CLARIF

## 2019-04-19 RX ORDER — HYDROXYZINE PAMOATE 25 MG/1
CAPSULE ORAL
Qty: 168 CAPSULE | Refills: 5 | Status: SHIPPED | OUTPATIENT
Start: 2019-04-19 | End: 2020-03-24 | Stop reason: SDUPTHER

## 2019-04-19 RX ORDER — LEVOCETIRIZINE DIHYDROCHLORIDE 5 MG/1
TABLET, FILM COATED ORAL
Qty: 28 TABLET | Refills: 5 | OUTPATIENT
Start: 2019-04-19

## 2019-04-25 DIAGNOSIS — E78.5 HYPERLIPIDEMIA, UNSPECIFIED HYPERLIPIDEMIA TYPE: ICD-10-CM

## 2019-04-26 RX ORDER — SIMVASTATIN 40 MG
TABLET ORAL
Qty: 28 TABLET | Refills: 5 | Status: SHIPPED | OUTPATIENT
Start: 2019-04-26 | End: 2019-05-01 | Stop reason: SDUPTHER

## 2019-05-01 DIAGNOSIS — E78.5 HYPERLIPIDEMIA, UNSPECIFIED HYPERLIPIDEMIA TYPE: ICD-10-CM

## 2019-05-01 DIAGNOSIS — G62.9 NEUROPATHY: ICD-10-CM

## 2019-05-01 DIAGNOSIS — R56.9 SEIZURE (HCC): ICD-10-CM

## 2019-05-01 RX ORDER — GABAPENTIN 100 MG/1
CAPSULE ORAL
Qty: 120 CAPSULE | Refills: 1 | Status: SHIPPED | OUTPATIENT
Start: 2019-05-01 | End: 2019-08-30 | Stop reason: SDUPTHER

## 2019-05-01 RX ORDER — ZONISAMIDE 100 MG/1
CAPSULE ORAL
Qty: 180 CAPSULE | Refills: 1 | Status: SHIPPED | OUTPATIENT
Start: 2019-05-01 | End: 2019-06-27 | Stop reason: SDUPTHER

## 2019-05-01 RX ORDER — SIMVASTATIN 40 MG
40 TABLET ORAL DAILY
Qty: 28 TABLET | Refills: 5 | Status: SHIPPED | OUTPATIENT
Start: 2019-05-01 | End: 2019-11-19 | Stop reason: SDUPTHER

## 2019-05-15 ENCOUNTER — TELEPHONE (OUTPATIENT)
Dept: OTHER | Facility: OTHER | Age: 54
End: 2019-05-15

## 2019-05-15 DIAGNOSIS — J30.9 ALLERGIC RHINITIS, UNSPECIFIED SEASONALITY, UNSPECIFIED TRIGGER: Primary | ICD-10-CM

## 2019-05-15 RX ORDER — LORATADINE 10 MG/1
10 TABLET ORAL AS NEEDED
Qty: 90 TABLET | Refills: 0 | Status: SHIPPED | OUTPATIENT
Start: 2019-05-15 | End: 2019-06-07

## 2019-06-03 DIAGNOSIS — J30.9 ALLERGIC RHINITIS, UNSPECIFIED SEASONALITY, UNSPECIFIED TRIGGER: ICD-10-CM

## 2019-06-04 RX ORDER — LORATADINE 10 MG/1
TABLET ORAL
Qty: 30 TABLET | Refills: 5 | Status: SHIPPED | OUTPATIENT
Start: 2019-06-04 | End: 2019-06-07

## 2019-06-07 DIAGNOSIS — J30.9 ALLERGIC RHINITIS, UNSPECIFIED SEASONALITY, UNSPECIFIED TRIGGER: ICD-10-CM

## 2019-06-07 DIAGNOSIS — L21.9 SEBORRHEIC DERMATITIS OF SCALP: ICD-10-CM

## 2019-06-07 DIAGNOSIS — E78.5 HYPERLIPIDEMIA, UNSPECIFIED HYPERLIPIDEMIA TYPE: ICD-10-CM

## 2019-06-07 DIAGNOSIS — E55.9 VITAMIN D DEFICIENCY: ICD-10-CM

## 2019-06-07 DIAGNOSIS — R56.9 SEIZURE (HCC): ICD-10-CM

## 2019-06-07 DIAGNOSIS — G62.9 NEUROPATHY: ICD-10-CM

## 2019-06-07 DIAGNOSIS — I10 HYPERTENSION, UNSPECIFIED TYPE: ICD-10-CM

## 2019-06-07 DIAGNOSIS — E08.00 DIABETES MELLITUS DUE TO UNDERLYING CONDITION WITH HYPEROSMOLARITY WITHOUT COMA, WITHOUT LONG-TERM CURRENT USE OF INSULIN (HCC): ICD-10-CM

## 2019-06-07 RX ORDER — LORATADINE 10 MG/1
10 TABLET ORAL DAILY
Qty: 90 TABLET | Refills: 0 | Status: SHIPPED | OUTPATIENT
Start: 2019-06-07 | End: 2019-11-19

## 2019-06-07 RX ORDER — DIPHENOXYLATE HYDROCHLORIDE AND ATROPINE SULFATE 2.5; .025 MG/1; MG/1
1 TABLET ORAL DAILY
Qty: 90 TABLET | Refills: 0 | Status: SHIPPED | OUTPATIENT
Start: 2019-06-07 | End: 2019-11-19

## 2019-06-07 RX ORDER — KETOCONAZOLE 20 MG/ML
SHAMPOO TOPICAL
Qty: 120 ML | Refills: 3 | Status: SHIPPED | OUTPATIENT
Start: 2019-06-07 | End: 2019-11-19 | Stop reason: SDUPTHER

## 2019-06-07 RX ORDER — MELATONIN
1000 DAILY
Qty: 90 TABLET | Refills: 1 | Status: SHIPPED | OUTPATIENT
Start: 2019-06-07 | End: 2019-11-19 | Stop reason: SDUPTHER

## 2019-06-07 RX ORDER — CALCIUM CARBONATE 500(1250)
1 TABLET ORAL 3 TIMES DAILY
Qty: 270 TABLET | Refills: 0 | Status: SHIPPED | OUTPATIENT
Start: 2019-06-07 | End: 2019-11-19 | Stop reason: SDUPTHER

## 2019-06-27 DIAGNOSIS — K59.00 CONSTIPATION, UNSPECIFIED CONSTIPATION TYPE: ICD-10-CM

## 2019-06-27 DIAGNOSIS — R56.9 SEIZURE (HCC): ICD-10-CM

## 2019-06-27 RX ORDER — ZONISAMIDE 100 MG/1
CAPSULE ORAL
Qty: 168 CAPSULE | Refills: 5 | Status: SHIPPED | OUTPATIENT
Start: 2019-06-27 | End: 2019-09-30 | Stop reason: HOSPADM

## 2019-06-28 RX ORDER — DOCUSATE SODIUM 100 MG/1
CAPSULE, LIQUID FILLED ORAL
Qty: 56 CAPSULE | Refills: 4 | Status: SHIPPED | OUTPATIENT
Start: 2019-06-28 | End: 2019-11-06 | Stop reason: SDUPTHER

## 2019-07-23 DIAGNOSIS — E06.3 HYPOTHYROIDISM DUE TO HASHIMOTO'S THYROIDITIS: ICD-10-CM

## 2019-07-23 DIAGNOSIS — E03.8 HYPOTHYROIDISM DUE TO HASHIMOTO'S THYROIDITIS: ICD-10-CM

## 2019-07-24 RX ORDER — LEVOTHYROXINE SODIUM 0.12 MG/1
TABLET ORAL
Qty: 28 TABLET | Refills: 5 | OUTPATIENT
Start: 2019-07-24

## 2019-07-29 RX ORDER — QUETIAPINE 400 MG/1
400 TABLET, EXTENDED RELEASE ORAL
Qty: 90 TABLET | Refills: 0 | Status: CANCELLED | OUTPATIENT
Start: 2019-07-29

## 2019-07-29 RX ORDER — QUETIAPINE 200 MG/1
200 TABLET, EXTENDED RELEASE ORAL
Qty: 6 TABLET | Refills: 0 | Status: CANCELLED | OUTPATIENT
Start: 2019-07-29

## 2019-07-31 ENCOUNTER — TRANSCRIBE ORDERS (OUTPATIENT)
Dept: LAB | Facility: CLINIC | Age: 54
End: 2019-07-31

## 2019-07-31 ENCOUNTER — APPOINTMENT (OUTPATIENT)
Dept: LAB | Facility: CLINIC | Age: 54
End: 2019-07-31
Payer: MEDICARE

## 2019-07-31 DIAGNOSIS — Z12.5 SCREENING FOR PROSTATE CANCER: ICD-10-CM

## 2019-07-31 DIAGNOSIS — E03.9 HYPOTHYROIDISM, UNSPECIFIED TYPE: ICD-10-CM

## 2019-07-31 DIAGNOSIS — E78.5 HYPERLIPIDEMIA, UNSPECIFIED HYPERLIPIDEMIA TYPE: ICD-10-CM

## 2019-07-31 DIAGNOSIS — E11.9 CONTROLLED TYPE 2 DIABETES MELLITUS WITHOUT COMPLICATION, WITHOUT LONG-TERM CURRENT USE OF INSULIN (HCC): ICD-10-CM

## 2019-07-31 DIAGNOSIS — E55.9 VITAMIN D DEFICIENCY: ICD-10-CM

## 2019-07-31 DIAGNOSIS — E53.8 VITAMIN B12 DEFICIENCY: ICD-10-CM

## 2019-07-31 LAB
ALBUMIN SERPL BCP-MCNC: 3.1 G/DL (ref 3.5–5)
ALP SERPL-CCNC: 50 U/L (ref 46–116)
ALT SERPL W P-5'-P-CCNC: 19 U/L (ref 12–78)
ANION GAP SERPL CALCULATED.3IONS-SCNC: 7 MMOL/L (ref 4–13)
AST SERPL W P-5'-P-CCNC: 17 U/L (ref 5–45)
BASOPHILS # BLD AUTO: 0.04 THOUSANDS/ΜL (ref 0–0.1)
BASOPHILS NFR BLD AUTO: 1 % (ref 0–1)
BILIRUB SERPL-MCNC: 0.31 MG/DL (ref 0.2–1)
BUN SERPL-MCNC: 22 MG/DL (ref 5–25)
CALCIUM SERPL-MCNC: 8.9 MG/DL (ref 8.3–10.1)
CHLORIDE SERPL-SCNC: 107 MMOL/L (ref 100–108)
CHOLEST SERPL-MCNC: 163 MG/DL (ref 50–200)
CO2 SERPL-SCNC: 28 MMOL/L (ref 21–32)
CREAT SERPL-MCNC: 0.62 MG/DL (ref 0.6–1.3)
EOSINOPHIL # BLD AUTO: 0.34 THOUSAND/ΜL (ref 0–0.61)
EOSINOPHIL NFR BLD AUTO: 5 % (ref 0–6)
ERYTHROCYTE [DISTWIDTH] IN BLOOD BY AUTOMATED COUNT: 15 % (ref 11.6–15.1)
GFR SERPL CREATININE-BSD FRML MDRD: 114 ML/MIN/1.73SQ M
GLUCOSE P FAST SERPL-MCNC: 106 MG/DL (ref 65–99)
HCT VFR BLD AUTO: 40.7 % (ref 36.5–49.3)
HDLC SERPL-MCNC: 59 MG/DL (ref 40–60)
HGB BLD-MCNC: 13.4 G/DL (ref 12–17)
IMM GRANULOCYTES # BLD AUTO: 0.03 THOUSAND/UL (ref 0–0.2)
IMM GRANULOCYTES NFR BLD AUTO: 0 % (ref 0–2)
LDLC SERPL CALC-MCNC: 88 MG/DL (ref 0–100)
LYMPHOCYTES # BLD AUTO: 3.92 THOUSANDS/ΜL (ref 0.6–4.47)
LYMPHOCYTES NFR BLD AUTO: 55 % (ref 14–44)
MCH RBC QN AUTO: 32.9 PG (ref 26.8–34.3)
MCHC RBC AUTO-ENTMCNC: 32.9 G/DL (ref 31.4–37.4)
MCV RBC AUTO: 100 FL (ref 82–98)
MONOCYTES # BLD AUTO: 1.03 THOUSAND/ΜL (ref 0.17–1.22)
MONOCYTES NFR BLD AUTO: 15 % (ref 4–12)
NEUTROPHILS # BLD AUTO: 1.7 THOUSANDS/ΜL (ref 1.85–7.62)
NEUTS SEG NFR BLD AUTO: 24 % (ref 43–75)
NRBC BLD AUTO-RTO: 0 /100 WBCS
PLATELET # BLD AUTO: 103 THOUSANDS/UL (ref 149–390)
PMV BLD AUTO: 13.1 FL (ref 8.9–12.7)
POTASSIUM SERPL-SCNC: 4 MMOL/L (ref 3.5–5.3)
PROT SERPL-MCNC: 6.7 G/DL (ref 6.4–8.2)
PSA SERPL-MCNC: 0.7 NG/ML (ref 0–4)
RBC # BLD AUTO: 4.07 MILLION/UL (ref 3.88–5.62)
SODIUM SERPL-SCNC: 142 MMOL/L (ref 136–145)
TRIGL SERPL-MCNC: 80 MG/DL
TSH SERPL DL<=0.05 MIU/L-ACNC: 2.18 UIU/ML (ref 0.36–3.74)
VIT B12 SERPL-MCNC: 858 PG/ML (ref 100–900)
WBC # BLD AUTO: 7.06 THOUSAND/UL (ref 4.31–10.16)

## 2019-07-31 PROCEDURE — 80053 COMPREHEN METABOLIC PANEL: CPT

## 2019-07-31 PROCEDURE — 85025 COMPLETE CBC W/AUTO DIFF WBC: CPT

## 2019-07-31 PROCEDURE — 82607 VITAMIN B-12: CPT

## 2019-07-31 PROCEDURE — 84443 ASSAY THYROID STIM HORMONE: CPT

## 2019-07-31 PROCEDURE — G0103 PSA SCREENING: HCPCS

## 2019-07-31 PROCEDURE — 82652 VIT D 1 25-DIHYDROXY: CPT

## 2019-07-31 PROCEDURE — 36415 COLL VENOUS BLD VENIPUNCTURE: CPT

## 2019-07-31 PROCEDURE — 80061 LIPID PANEL: CPT

## 2019-08-01 ENCOUNTER — OFFICE VISIT (OUTPATIENT)
Dept: FAMILY MEDICINE CLINIC | Facility: HOME HEALTHCARE | Age: 54
End: 2019-08-01
Payer: MEDICARE

## 2019-08-01 ENCOUNTER — APPOINTMENT (OUTPATIENT)
Dept: RADIOLOGY | Facility: CLINIC | Age: 54
End: 2019-08-01
Payer: MEDICARE

## 2019-08-01 DIAGNOSIS — M25.572 ACUTE LEFT ANKLE PAIN: ICD-10-CM

## 2019-08-01 DIAGNOSIS — F69 BEHAVIOR CONCERN IN ADULT: ICD-10-CM

## 2019-08-01 DIAGNOSIS — G83.89 CEREBRAL PARESIS WITH HOMOLATERAL ATAXIA (HCC): ICD-10-CM

## 2019-08-01 DIAGNOSIS — G40.309 GENERALIZED CONVULSIVE EPILEPSY (HCC): Primary | ICD-10-CM

## 2019-08-01 DIAGNOSIS — M25.571 ACUTE RIGHT ANKLE PAIN: ICD-10-CM

## 2019-08-01 DIAGNOSIS — D69.6 THROMBOCYTOPENIA (HCC): ICD-10-CM

## 2019-08-01 PROBLEM — L21.9 SEBORRHEIC DERMATITIS OF SCALP: Status: ACTIVE | Noted: 2019-08-01

## 2019-08-01 PROBLEM — R51.9 HEADACHE: Status: ACTIVE | Noted: 2019-08-01

## 2019-08-01 PROBLEM — H52.10 NEARSIGHTEDNESS: Status: ACTIVE | Noted: 2019-08-01

## 2019-08-01 LAB — 1,25(OH)2D3 SERPL-MCNC: 29.8 PG/ML (ref 19.9–79.3)

## 2019-08-01 PROCEDURE — 73610 X-RAY EXAM OF ANKLE: CPT

## 2019-08-01 PROCEDURE — 99213 OFFICE O/P EST LOW 20 MIN: CPT | Performed by: FAMILY MEDICINE

## 2019-08-01 RX ORDER — QUETIAPINE 400 MG/1
TABLET, EXTENDED RELEASE ORAL
Qty: 60 TABLET | Refills: 0 | Status: SHIPPED | OUTPATIENT
Start: 2019-08-01 | End: 2019-09-17 | Stop reason: SDUPTHER

## 2019-08-01 RX ORDER — IBUPROFEN 600 MG/1
600 TABLET ORAL EVERY 8 HOURS PRN
Qty: 30 TABLET | Refills: 0 | Status: SHIPPED | OUTPATIENT
Start: 2019-08-01 | End: 2019-09-30 | Stop reason: HOSPADM

## 2019-08-01 RX ORDER — QUETIAPINE 200 MG/1
TABLET, EXTENDED RELEASE ORAL
Qty: 16 TABLET | Refills: 0 | Status: SHIPPED | OUTPATIENT
Start: 2019-08-01 | End: 2019-08-21 | Stop reason: SDUPTHER

## 2019-08-01 NOTE — PROGRESS NOTES
[address]       NAME: Michael Daigle is a 48 y o  male  : 1965    MRN: [id number]  DATE: 2019  TIME: 1:11 PM    Assessment and Plan   There are no diagnoses linked to this encounter  No problem-specific Assessment & Plan notes found for this encounter  Patient Instructions       seroquel refilled until starts with [facility]  2019 xray left ankle as ordered and nsaid as needed     Chief Complaint   No chief complaint on file  History of Present Illness       48year old male at office needs refill on seroquel he normally gets this refilled by Dr Stacy George he had last seen her in May 2019 and she had left the group and he is now out of his meds according to care worked he takes this medication for his seizures and behavior problemes she denies nay history of bipolar and he is set to see [address] psychiatry in 2019  He also has complaint of new onset left ankle pain he was found at group home sitting on floor  Yesterday at 1645 outside with shoe off and he is not able to bear weight they stated he has not hit his head during this time   Ankle Pain    The incident occurred 12 to 24 hours ago  The incident occurred at home  The injury mechanism is unknown  The pain is present in the left ankle  The quality of the pain is described as aching  The pain is moderate  The pain has been constant since onset  Associated symptoms include an inability to bear weight  Pertinent negatives include no loss of motion, loss of sensation, muscle weakness, numbness or tingling  He reports no foreign bodies present  Nothing aggravates the symptoms  He has tried nothing for the symptoms  The treatment provided no relief  Review of Systems   Review of Systems   Constitutional: Negative  HENT: Negative  Eyes: Negative  Respiratory: Negative  Cardiovascular: Negative  Gastrointestinal: Negative  Endocrine: Negative  Genitourinary: Negative  Musculoskeletal: Positive for arthralgias  Skin: Negative  Allergic/Immunologic: Negative  Neurological: Negative  Negative for tingling and numbness  Hematological: Negative  Psychiatric/Behavioral: Negative  All other systems reviewed and are negative          Current Medications       Current Outpatient Medications:     benztropine (COGENTIN) 0 5 mg tablet, TAKE 1 TABLET BY MOUTH TWICE A DAY (8AM,8PM), Disp: 56 tablet, Rfl: 5    calcium carbonate (OYSTER SHELL,OSCAL) 500 mg, Take 1 tablet by mouth 3 (three) times a day, Disp: 270 tablet, Rfl: 0    cholecalciferol (VITAMIN D3) 1,000 units tablet, Take 1 tablet (1,000 Units total) by mouth daily, Disp: 90 tablet, Rfl: 1    cyanocobalamin (VITAMIN B-12) 1,000 mcg tablet, TAKE 1 TABLET BY MOUTH EVERY OTHER DAY (8 AM), Disp: 180 tablet, Rfl: 0    divalproex sodium (DEPAKOTE ER) 500 mg 24 hr tablet, Take 2 tablets in the AM and 3 tablets at night , Disp: 450 tablet, Rfl: 0    docosanol (ABREVA) 10 %, Apply topically 5 (five) times a day, Disp: 2 g, Rfl: 0    docusate sodium (COLACE) 100 mg capsule, TAKE 1 CAPSULE BY MOUTH TWICE A DAY (8AM,8PM), Disp: 56 capsule, Rfl: 4    gabapentin (NEURONTIN) 100 mg capsule, Take 200mg in the AM, 100mg at 2pm and 8pm , Disp: 120 capsule, Rfl: 1    haloperidol (HALDOL) 2 mg tablet, Take by mouth, Disp: , Rfl:     hydrOXYzine pamoate (VISTARIL) 25 mg capsule, TAKE 2 CAPSULES (50MG) BY MOUTH 3 TIMES A DAY (8AM,2PM,8PM), Disp: 168 capsule, Rfl: 5    ketoconazole (NIZORAL) 2 % shampoo, Wash hair and sideburns at least 3 times per week, Disp: 120 mL, Rfl: 3    levothyroxine 125 mcg tablet, Take 1 tablet (125 mcg total) by mouth daily, Disp: 90 tablet, Rfl: 1    lisinopril (ZESTRIL) 10 mg tablet, Take 1 tablet (10 mg total) by mouth daily, Disp: 90 tablet, Rfl: 0    loratadine (ALLERGY) 10 mg tablet, Take 1 tablet (10 mg total) by mouth daily Take 1 tablet by mouth daily PRN, Disp: 90 tablet, Rfl: 0   metFORMIN (GLUCOPHAGE) 1000 MG tablet, Take 1 tablet (1,000 mg total) by mouth 2 (two) times a day with meals, Disp: 180 tablet, Rfl: 1    multivitamin (THERAGRAN) TABS, Take 1 tablet by mouth daily, Disp: 90 tablet, Rfl: 0    REGULOID 58 6 % powder, Take by mouth daily, Disp: 283 g, Rfl: 3    SEROQUEL  MG 24 hr tablet, TAKE 1 TABLET BY MOUTH TWICE DAILY (8 AM + 2 PM), Disp: , Rfl: 4    SEROQUEL  MG 24 hr tablet, TAKE 1 TABLET BY MOUTH DAILY AT BEDTIME (8 PM)   DX  ANTIPSYCHOTIC, Disp: , Rfl: 4    simvastatin (ZOCOR) 40 mg tablet, Take 1 tablet (40 mg total) by mouth daily, Disp: 28 tablet, Rfl: 5    zonisamide (ZONEGRAN) 100 mg capsule, TAKE 6 CAPSULES (600MG) BY MOUTH DAILY AT BEDTIME (8PM), Disp: 168 capsule, Rfl: 5    Current Allergies     Allergies as of 08/01/2019 - Reviewed 03/14/2019   Allergen Reaction Noted    Erythromycin  11/22/2013    Penicillins  11/22/2013            The following portions of the patient's history were reviewed and updated as appropriate: allergies, current medications, past family history, past medical history, past social history, past surgical history and problem list      No past medical history on file  No past surgical history on file  No family history on file  Medications have been verified  Objective   There were no vitals taken for this visit  Physical Exam     Physical Exam   Constitutional: He is oriented to person, place, and time  Vital signs are normal  He appears well-developed and well-nourished  He is cooperative  HENT:   Head: Normocephalic and atraumatic  Right Ear: External ear normal    Left Ear: External ear normal    Nose: Nose normal    Mouth/Throat: Oropharynx is clear and moist    Eyes: Pupils are equal, round, and reactive to light  Conjunctivae, EOM and lids are normal    Neck: Normal range of motion  Neck supple  Cardiovascular: Normal rate, regular rhythm, normal heart sounds and intact distal pulses  Pulmonary/Chest: Effort normal and breath sounds normal    Abdominal: Soft  Normal appearance and bowel sounds are normal    Musculoskeletal:        Right ankle: He exhibits no swelling, no ecchymosis, no deformity, no laceration and normal pulse  No tenderness  No AITFL, no CF ligament, no posterior TFL, no head of 5th metatarsal and no proximal fibula tenderness found  Achilles tendon normal         Left ankle: He exhibits decreased range of motion  He exhibits no swelling, no ecchymosis, no deformity, no laceration and normal pulse  Tenderness  Lateral malleolus and medial malleolus tenderness found  No AITFL, no CF ligament, no posterior TFL, no head of 5th metatarsal and no proximal fibula tenderness found  Neurological: He is alert and oriented to person, place, and time  He has normal reflexes  Skin: Skin is warm, dry and intact  Psychiatric: He has a normal mood and affect  His speech is normal and behavior is normal  Judgment and thought content normal    Nursing note and vitals reviewed

## 2019-08-16 DIAGNOSIS — E03.8 HYPOTHYROIDISM DUE TO HASHIMOTO'S THYROIDITIS: ICD-10-CM

## 2019-08-16 DIAGNOSIS — E06.3 HYPOTHYROIDISM DUE TO HASHIMOTO'S THYROIDITIS: ICD-10-CM

## 2019-08-16 RX ORDER — LEVOTHYROXINE SODIUM 0.12 MG/1
TABLET ORAL
Qty: 12 TABLET | Refills: 10 | Status: SHIPPED | OUTPATIENT
Start: 2019-08-16 | End: 2019-11-19 | Stop reason: SDUPTHER

## 2019-08-21 DIAGNOSIS — G40.309 GENERALIZED CONVULSIVE EPILEPSY (HCC): ICD-10-CM

## 2019-08-21 DIAGNOSIS — F69 BEHAVIOR CONCERN IN ADULT: ICD-10-CM

## 2019-08-21 RX ORDER — QUETIAPINE 200 MG/1
TABLET, EXTENDED RELEASE ORAL
Qty: 16 TABLET | Refills: 0 | Status: SHIPPED | OUTPATIENT
Start: 2019-08-21 | End: 2019-08-26 | Stop reason: SDUPTHER

## 2019-08-26 DIAGNOSIS — F69 BEHAVIOR CONCERN IN ADULT: ICD-10-CM

## 2019-08-26 DIAGNOSIS — G40.309 GENERALIZED CONVULSIVE EPILEPSY (HCC): ICD-10-CM

## 2019-08-26 RX ORDER — QUETIAPINE 200 MG/1
TABLET, FILM COATED, EXTENDED RELEASE ORAL
Qty: 16 TABLET | Refills: 10 | Status: SHIPPED | OUTPATIENT
Start: 2019-08-26 | End: 2022-03-28 | Stop reason: SDUPTHER

## 2019-08-28 ENCOUNTER — OFFICE VISIT (OUTPATIENT)
Dept: FAMILY MEDICINE CLINIC | Facility: HOME HEALTHCARE | Age: 54
End: 2019-08-28
Payer: MEDICARE

## 2019-08-28 VITALS
DIASTOLIC BLOOD PRESSURE: 79 MMHG | RESPIRATION RATE: 17 BRPM | TEMPERATURE: 98.5 F | HEART RATE: 84 BPM | BODY MASS INDEX: 25.2 KG/M2 | HEIGHT: 70 IN | SYSTOLIC BLOOD PRESSURE: 120 MMHG | WEIGHT: 176 LBS | OXYGEN SATURATION: 96 %

## 2019-08-28 DIAGNOSIS — E11.9 TYPE 2 DIABETES MELLITUS WITHOUT COMPLICATION, WITHOUT LONG-TERM CURRENT USE OF INSULIN (HCC): ICD-10-CM

## 2019-08-28 DIAGNOSIS — Z12.11 SCREENING FOR COLON CANCER: ICD-10-CM

## 2019-08-28 DIAGNOSIS — I10 HYPERTENSION, UNSPECIFIED TYPE: ICD-10-CM

## 2019-08-28 DIAGNOSIS — E66.3 OVERWEIGHT (BMI 25.0-29.9): ICD-10-CM

## 2019-08-28 DIAGNOSIS — E78.5 HYPERLIPIDEMIA, UNSPECIFIED HYPERLIPIDEMIA TYPE: ICD-10-CM

## 2019-08-28 DIAGNOSIS — E03.9 HYPOTHYROIDISM, UNSPECIFIED TYPE: Primary | ICD-10-CM

## 2019-08-28 DIAGNOSIS — G40.309 GENERALIZED CONVULSIVE EPILEPSY (HCC): ICD-10-CM

## 2019-08-28 LAB — SL AMB POCT HEMOGLOBIN AIC: 5.8 (ref ?–6.5)

## 2019-08-28 PROCEDURE — 99213 OFFICE O/P EST LOW 20 MIN: CPT | Performed by: FAMILY MEDICINE

## 2019-08-28 NOTE — PROGRESS NOTES
[facility]       NAME: Zeyad Watkins is a 48 y o  male  : 1965    MRN: [id number]  DATE: 2019  TIME: 12:14 PM    Assessment and Plan   Diagnoses and all orders for this visit:    Hypothyroidism, unspecified type    Hypertension, unspecified type    Hyperlipidemia, unspecified hyperlipidemia type    Type 2 diabetes mellitus without complication, without long-term current use of insulin ([facility])  -     POCT hemoglobin A1c    Generalized convulsive epilepsy ([facility] [address] )  -     Valproic acid level, total; Future        No problem-specific Assessment & Plan notes found for this encounter  Patient Instructions       Medications refilled continue current medications reviewed labs did add Depakote level and follow up in 3 months      Chief Complaint     Chief Complaint   Patient presents with    Follow-up     check up    Medication Refill     "everything"         History of Present Illness       56-year-old male presents for routine follow-up and had recent  Labs done 1 month ago did review these results with patient and his caretaker I noted no Depakote level on these recent labs did order this at this time  Did know how low platelets on labs I denies any prolonged bleeding  Has no questions or concerns as stated ankle pain has completely resolved from last visit  Has past medical history of hypothyroidism which is well controlled on levothyroxine TSH levels within normal limits  Has history of hypertension BP is within normal limits well controlled on medications  Has history of hyperlipidemia lipid panel is within normal limits well controlled on current medications  Has history of epilepsy is currently on Depakote for this stated last seizure was greater than 10 years ago  Hypertension   This is a chronic problem  The current episode started more than 1 year ago  The problem is unchanged  The problem is controlled   Pertinent negatives include no anxiety, blurred vision, chest pain, headaches, malaise/fatigue, neck pain, orthopnea, palpitations, peripheral edema, PND, shortness of breath or sweats  There are no associated agents to hypertension  Risk factors for coronary artery disease include diabetes mellitus  Past treatments include ACE inhibitors  The current treatment provides mild improvement  There are no compliance problems  There is no history of chronic renal disease  Hyperlipidemia   This is a chronic problem  The current episode started more than 1 year ago  The problem is controlled  Recent lipid tests were reviewed and are normal  Exacerbating diseases include hypothyroidism  He has no history of chronic renal disease, diabetes, liver disease, obesity or nephrotic syndrome  There are no known factors aggravating his hyperlipidemia  Pertinent negatives include no chest pain, focal sensory loss, focal weakness, leg pain, myalgias or shortness of breath  Current antihyperlipidemic treatment includes statins  The current treatment provides moderate improvement of lipids  There are no compliance problems  Risk factors for coronary artery disease include diabetes mellitus, hypertension, male sex and dyslipidemia  Diabetes   He presents for his follow-up diabetic visit  He has type 2 diabetes mellitus  No MedicAlert identification noted  His disease course has been stable  There are no hypoglycemic associated symptoms  Pertinent negatives for hypoglycemia include no headaches or sweats  There are no diabetic associated symptoms  Pertinent negatives for diabetes include no blurred vision and no chest pain  There are no hypoglycemic complications  Symptoms are stable  There are no diabetic complications  Risk factors for coronary artery disease include diabetes mellitus, dyslipidemia, hypertension and male sex  Current diabetic treatment includes oral agent (monotherapy)  He is compliant with treatment all of the time  His weight is stable  He is following a diabetic diet   Meal planning includes ADA exchanges  He has not had a previous visit with a dietitian  He participates in exercise daily  (Does not check blood sugars at home is monitored with hemoglobin A1c) An ACE inhibitor/angiotensin II receptor blocker is being taken  He sees a podiatrist Eye exam is current  Review of Systems   Review of Systems   Constitutional: Negative  Negative for malaise/fatigue  HENT: Negative  Eyes: Negative  Negative for blurred vision  Respiratory: Negative  Negative for shortness of breath  Cardiovascular: Negative  Negative for chest pain, palpitations, orthopnea and PND  Gastrointestinal: Negative  Endocrine: Negative  Genitourinary: Negative  Musculoskeletal: Negative  Negative for myalgias and neck pain  Skin: Negative  Allergic/Immunologic: Negative  Neurological: Negative  Negative for focal weakness and headaches  Hematological: Negative  Psychiatric/Behavioral: Negative  All other systems reviewed and are negative          Current Medications       Current Outpatient Medications:     benztropine (COGENTIN) 0 5 mg tablet, TAKE 1 TABLET BY MOUTH TWICE A DAY (8AM,8PM), Disp: 56 tablet, Rfl: 5    calcium carbonate (OYSTER SHELL,OSCAL) 500 mg, Take 1 tablet by mouth 3 (three) times a day, Disp: 270 tablet, Rfl: 0    cholecalciferol (VITAMIN D3) 1,000 units tablet, Take 1 tablet (1,000 Units total) by mouth daily (Patient taking differently: Take 1,000 Units by mouth every other day ), Disp: 90 tablet, Rfl: 1    cyanocobalamin (VITAMIN B-12) 1,000 mcg tablet, TAKE 1 TABLET BY MOUTH EVERY OTHER DAY (8 AM), Disp: 180 tablet, Rfl: 0    divalproex sodium (DEPAKOTE ER) 500 mg 24 hr tablet, Take 2 tablets in the AM and 3 tablets at night , Disp: 450 tablet, Rfl: 0    docusate sodium (COLACE) 100 mg capsule, TAKE 1 CAPSULE BY MOUTH TWICE A DAY (8AM,8PM), Disp: 56 capsule, Rfl: 4    gabapentin (NEURONTIN) 100 mg capsule, Take 200mg in the AM, 100mg at 2pm and 8pm , Disp: 120 capsule, Rfl: 1    hydrOXYzine pamoate (VISTARIL) 25 mg capsule, TAKE 2 CAPSULES (50MG) BY MOUTH 3 TIMES A DAY (8AM,2PM,8PM), Disp: 168 capsule, Rfl: 5    ketoconazole (NIZORAL) 2 % shampoo, Wash hair and sideburns at least 3 times per week, Disp: 120 mL, Rfl: 3    levothyroxine 125 mcg tablet, TAKE 1 TABLET BY MOUTH DAILY (8AM), Disp: 12 tablet, Rfl: 10    lisinopril (ZESTRIL) 10 mg tablet, Take 1 tablet (10 mg total) by mouth daily, Disp: 90 tablet, Rfl: 0    loratadine (ALLERGY) 10 mg tablet, Take 1 tablet (10 mg total) by mouth daily Take 1 tablet by mouth daily PRN, Disp: 90 tablet, Rfl: 0    metFORMIN (GLUCOPHAGE) 1000 MG tablet, Take 1 tablet (1,000 mg total) by mouth 2 (two) times a day with meals, Disp: 180 tablet, Rfl: 1    multivitamin (THERAGRAN) TABS, Take 1 tablet by mouth daily, Disp: 90 tablet, Rfl: 0    QUEtiapine (SEROquel XR) 200 mg 24 hr tablet, TAKE 1 TABLET BY MOUTH TWICE A DAY (8AM,2PM), Disp: 16 tablet, Rfl: 10    SEROQUEL  MG 24 hr tablet, 1 tablet daily at bedtime, Disp: 60 tablet, Rfl: 0    simvastatin (ZOCOR) 40 mg tablet, Take 1 tablet (40 mg total) by mouth daily, Disp: 28 tablet, Rfl: 5    zonisamide (ZONEGRAN) 100 mg capsule, TAKE 6 CAPSULES (600MG) BY MOUTH DAILY AT BEDTIME (8PM), Disp: 168 capsule, Rfl: 5    ibuprofen (MOTRIN) 600 mg tablet, Take 1 tablet (600 mg total) by mouth every 8 (eight) hours as needed for mild pain for up to 10 days, Disp: 30 tablet, Rfl: 0    Current Allergies     Allergies as of 08/28/2019 - Reviewed 08/01/2019   Allergen Reaction Noted    Erythromycin  11/22/2013    Penicillins  11/22/2013            The following portions of the patient's history were reviewed and updated as appropriate: allergies, current medications, past family history, past medical history, past social history, past surgical history and problem list      No past medical history on file  No past surgical history on file      No family history on file  Medications have been verified  Objective   /79 (BP Location: Left arm, Patient Position: Sitting, Cuff Size: Standard)   Pulse 84   Temp 98 5 °F (36 9 °C) (Temporal)   Resp 17   Ht 5' 10" (1 778 m)   Wt 79 8 kg (176 lb)   SpO2 96%   BMI 25 25 kg/m²        Physical Exam     Physical Exam   Constitutional: He is oriented to person, place, and time  Vital signs are normal  He appears well-developed  HENT:   Head: Normocephalic and atraumatic  Right Ear: Hearing, tympanic membrane, external ear and ear canal normal    Left Ear: Hearing, tympanic membrane, external ear and ear canal normal    Nose: Nose normal    Mouth/Throat: Uvula is midline, oropharynx is clear and moist and mucous membranes are normal    Eyes: Pupils are equal, round, and reactive to light  Conjunctivae, EOM and lids are normal    Neck: Trachea normal, normal range of motion and full passive range of motion without pain  Neck supple  Cardiovascular: Normal rate, regular rhythm, normal heart sounds, intact distal pulses and normal pulses  Pulmonary/Chest: Effort normal and breath sounds normal    Abdominal: Soft  Normal appearance and bowel sounds are normal    Musculoskeletal: Normal range of motion  Neurological: He is alert and oriented to person, place, and time  He has normal reflexes  Skin: Skin is warm, dry and intact  Psychiatric: He has a normal mood and affect  His speech is normal and behavior is normal  Judgment and thought content normal    Nursing note and vitals reviewed  BMI Counseling: Body mass index is 25 25 kg/m²  Discussed the patient's BMI with him  The BMI is above average  BMI counseling and education was provided to the patient   Nutrition recommendations include reducing portion sizes, decreasing overall calorie intake, 3-5 servings of fruits/vegetables daily, reducing fast food intake, consuming healthier snacks, decreasing soda and/or juice intake, moderation in carbohydrate intake, increasing intake of lean protein, reducing intake of saturated fat and trans fat and reducing intake of cholesterol  Exercise recommendations include moderate aerobic physical activity for 150 minutes/week

## 2019-08-28 NOTE — PATIENT INSTRUCTIONS
Low Fat Diet   AMBULATORY CARE:   A low-fat diet  is an eating plan that is low in total fat, unhealthy fat, and cholesterol  You may need to follow a low-fat diet if you have trouble digesting or absorbing fat  You may also need to follow this diet if you have high cholesterol  You can also lower your cholesterol by increasing the amount of fiber in your diet  Soluble fiber is a type of fiber that helps to decrease cholesterol levels  Different types of fat in food:   · Limit unhealthy fats  A diet that is high in cholesterol, saturated fat, and trans fat may cause unhealthy cholesterol levels  Unhealthy cholesterol levels increase your risk of heart disease  ¨ Cholesterol:  Limit intake of cholesterol to less than 200 mg per day  Cholesterol is found in meat, eggs, and dairy  ¨ Saturated fat:  Limit saturated fat to less than 7% of your total daily calories  Ask your dietitian how many calories you need each day  Saturated fat is found in butter, cheese, ice cream, whole milk, and palm oil  Saturated fat is also found in meat, such as beef, pork, chicken skin, and processed meats  Processed meats include sausage, hot dogs, and bologna  ¨ Trans fat:  Avoid trans fat as much as possible  Trans fat is used in fried and baked foods  Foods that say trans fat free on the label may still have up to 0 5 grams of trans fat per serving  · Include healthy fats  Replace foods that are high in saturated and trans fat with foods high in healthy fats  This may help to decrease high cholesterol levels  ¨ Monounsaturated fats: These are found in avocados, nuts, and vegetable oils, such as olive, canola, and sunflower oil  ¨ Polyunsaturated fats: These can be found in vegetable oils, such as soybean or corn oil  Omega-3 fats can help to decrease the risk of heart disease  Omega-3 fats are found in fish, such as salmon, herring, trout, and tuna   Omega-3 fats can also be found in plant foods, such as walnuts, flaxseed, soybeans, and canola oil    Foods to limit or avoid:   · Grains:      ¨ Snacks that are made with partially hydrogenated oils, such as chips, regular crackers, and butter-flavored popcorn    ¨ High-fat baked goods, such as biscuits, croissants, doughnuts, pies, cookies, and pastries    · Dairy:      ¨ Whole milk, 2% milk, and yogurt and ice cream made with whole milk    ¨ Half and half creamer, heavy cream, and whipping cream    ¨ Cheese, cream cheese, and sour cream    · Meats and proteins:      ¨ High-fat cuts of meat (T-bone steak, regular hamburger, and ribs)    ¨ Fried meat, poultry (turkey and chicken), and fish    ¨ Poultry (chicken and turkey) with skin    ¨ Cold cuts (salami or bologna), hot dogs, hoffmann, and sausage    ¨ Whole eggs and egg yolks    · Vegetables and fruits with added fat:      ¨ Fried vegetables or vegetables in butter or high-fat sauces, such as cream or cheese sauces    ¨ Fried fruit or fruit served with butter or cream    · Fats:      ¨ Butter, stick margarine, and shortening    ¨ Coconut, palm oil, and palm kernel oil  Foods to include:   · Grains:      ¨ Whole-grain breads, cereals, pasta, and brown rice    ¨ Low-fat crackers and pretzels    · Vegetables and fruits:      ¨ Fresh, frozen, or canned vegetables (no salt or low-sodium)    ¨ Fresh, frozen, dried, or canned fruit (canned in light syrup or fruit juice)    ¨ Avocado    · Low-fat dairy products:      ¨ Nonfat (skim) or 1% milk    ¨ Nonfat or low-fat cheese, yogurt, and cottage cheese    · Meats and proteins:      ¨ Chicken or turkey with no skin    ¨ Baked or broiled fish    ¨ Lean beef and pork (loin, round, extra lean hamburger)    ¨ Beans and peas, unsalted nuts, soy products    ¨ Egg whites and substitutes    ¨ Seeds and nuts    · Fats:      ¨ Unsaturated oil, such as canola, olive, peanut, soybean, or sunflower oil    ¨ Soft or liquid margarine and vegetable oil spread    ¨ Low-fat salad dressing  Other ways to decrease fat:   · Read food labels before you buy foods  Choose foods that have less than 30% of calories from fat  Choose low-fat or fat-free dairy products  Remember that fat free does not mean calorie free  These foods still contain calories, and too many calories can lead to weight gain  · Trim fat from meat and avoid fried food  Trim all visible fat from meat before you cook it  Remove the skin from poultry  Do not tompkins meat, fish, or poultry  Bake, roast, boil, or broil these foods instead  Avoid fried foods  Eat a baked potato instead of Western Kourtney fries  Steam vegetables instead of sautéing them in butter  · Add less fat to foods  Use imitation hoffmann bits on salads and baked potatoes instead of regular hoffmann bits  Use fat-free or low-fat salad dressings instead of regular dressings  Use low-fat or nonfat butter-flavored topping instead of regular butter or margarine on popcorn and other foods  Ways to decrease fat in recipes:  Replace high-fat ingredients with low-fat or nonfat ones  This may cause baked goods to be drier than usual  You may need to use nonfat cooking spray on pans to prevent food from sticking  You also may need to change the amount of other ingredients, such as water, in the recipe  Try the following:  · Use low-fat or light margarine instead of regular margarine or shortening  · Use lean ground turkey breast or chicken, or lean ground beef (less than 5% fat) instead of hamburger  · Add 1 teaspoon of canola oil to 8 ounces of skim milk instead of using cream or half and half  · Use grated zucchini, carrots, or apples in breads instead of coconut  · Use blenderized, low-fat cottage cheese, plain tofu, or low-fat ricotta cheese instead of cream cheese  · Use 1 egg white and 1 teaspoon of canola oil, or use ¼ cup (2 ounces) of fat-free egg substitute instead of a whole egg       · Replace half of the oil that is called for in a recipe with applesauce when you bake  Use 3 tablespoons of cocoa powder and 1 tablespoon of canola oil instead of a square of baking chocolate  How to increase fiber:  Eat enough high-fiber foods to get 20 to 30 grams of fiber every day  Slowly increase your fiber intake to avoid stomach cramps, gas, and other problems  · Eat 3 ounces of whole-grain foods each day  An ounce is about 1 slice of bread  Eat whole-grain breads, such as whole-wheat bread  Whole wheat, whole-wheat flour, or other whole grains should be listed as the first ingredient on the food label  Replace white flour with whole-grain flour or use half of each in recipes  Whole-grain flour is heavier than white flour, so you may have to add more yeast or baking powder  · Eat a high-fiber cereal for breakfast   Oatmeal is a good source of soluble fiber  Look for cereals that have bran or fiber in the name  Choose whole-grain products, such as brown rice, barley, and whole-wheat pasta  · Eat more beans, peas, and lentils  For example, add beans to soups or salads  Eat at least 5 cups of fruits and vegetables each day  Eat fruits and vegetables with the peel because the peel is high in fiber  © 2017 [employer] Information is for End User's use only and may not be sold, redistributed or otherwise used for commercial purposes  All illustrations and images included in CareNotes® are the copyrighted property of [employer]  or Pranav Sánchez  The above information is an  only  It is not intended as medical advice for individual conditions or treatments  Talk to your doctor, nurse or pharmacist before following any medical regimen to see if it is safe and effective for you

## 2019-08-30 DIAGNOSIS — G62.9 NEUROPATHY: ICD-10-CM

## 2019-08-30 RX ORDER — GABAPENTIN 100 MG/1
CAPSULE ORAL
Qty: 120 CAPSULE | Refills: 1 | Status: SHIPPED | OUTPATIENT
Start: 2019-08-30 | End: 2019-09-30 | Stop reason: HOSPADM

## 2019-09-17 DIAGNOSIS — I10 HYPERTENSION, UNSPECIFIED TYPE: ICD-10-CM

## 2019-09-17 DIAGNOSIS — F69 BEHAVIOR CONCERN IN ADULT: ICD-10-CM

## 2019-09-17 DIAGNOSIS — G40.309 GENERALIZED CONVULSIVE EPILEPSY (HCC): ICD-10-CM

## 2019-09-17 RX ORDER — QUETIAPINE 400 MG/1
TABLET, FILM COATED, EXTENDED RELEASE ORAL
Qty: 28 TABLET | Refills: 10 | Status: SHIPPED | OUTPATIENT
Start: 2019-09-17 | End: 2021-11-03 | Stop reason: SDUPTHER

## 2019-09-23 RX ORDER — LISINOPRIL 10 MG/1
TABLET ORAL
Qty: 28 TABLET | Refills: 10 | Status: SHIPPED | OUTPATIENT
Start: 2019-09-23 | End: 2019-11-19 | Stop reason: SDUPTHER

## 2019-09-24 ENCOUNTER — APPOINTMENT (INPATIENT)
Dept: CT IMAGING | Facility: HOSPITAL | Age: 54
DRG: 871 | End: 2019-09-24
Payer: MEDICARE

## 2019-09-24 ENCOUNTER — APPOINTMENT (INPATIENT)
Dept: ULTRASOUND IMAGING | Facility: HOSPITAL | Age: 54
DRG: 871 | End: 2019-09-24
Payer: MEDICARE

## 2019-09-24 ENCOUNTER — APPOINTMENT (EMERGENCY)
Dept: CT IMAGING | Facility: HOSPITAL | Age: 54
DRG: 871 | End: 2019-09-24
Payer: MEDICARE

## 2019-09-24 ENCOUNTER — HOSPITAL ENCOUNTER (INPATIENT)
Facility: HOSPITAL | Age: 54
LOS: 6 days | Discharge: HOME HEALTH | DRG: 871 | End: 2019-09-30
Attending: EMERGENCY MEDICINE | Admitting: INTERNAL MEDICINE
Payer: MEDICARE

## 2019-09-24 ENCOUNTER — APPOINTMENT (EMERGENCY)
Dept: RADIOLOGY | Facility: HOSPITAL | Age: 54
DRG: 871 | End: 2019-09-24
Payer: MEDICARE

## 2019-09-24 DIAGNOSIS — T42.6X1A VALPROIC ACID TOXICITY: ICD-10-CM

## 2019-09-24 DIAGNOSIS — N39.0 UTI (URINARY TRACT INFECTION): ICD-10-CM

## 2019-09-24 DIAGNOSIS — N30.01 ACUTE CYSTITIS WITH HEMATURIA: Chronic | ICD-10-CM

## 2019-09-24 DIAGNOSIS — G40.309 GENERALIZED CONVULSIVE EPILEPSY (HCC): ICD-10-CM

## 2019-09-24 DIAGNOSIS — K59.09 OTHER CONSTIPATION: Chronic | ICD-10-CM

## 2019-09-24 DIAGNOSIS — N17.9 AKI (ACUTE KIDNEY INJURY) (HCC): Chronic | ICD-10-CM

## 2019-09-24 DIAGNOSIS — N39.0 SEPSIS DUE TO URINARY TRACT INFECTION (HCC): Chronic | ICD-10-CM

## 2019-09-24 DIAGNOSIS — N17.9 ACUTE RENAL FAILURE (ARF) (HCC): Primary | ICD-10-CM

## 2019-09-24 DIAGNOSIS — N20.0 NEPHROLITHIASIS: ICD-10-CM

## 2019-09-24 DIAGNOSIS — A41.9 SEPSIS DUE TO URINARY TRACT INFECTION (HCC): Chronic | ICD-10-CM

## 2019-09-24 PROBLEM — G92.8 TOXIC METABOLIC ENCEPHALOPATHY: Status: ACTIVE | Noted: 2019-09-24

## 2019-09-24 LAB
ALBUMIN SERPL BCP-MCNC: 3.2 G/DL (ref 3.5–5.7)
ALP SERPL-CCNC: 45 U/L (ref 40–150)
ALT SERPL W P-5'-P-CCNC: 10 U/L (ref 7–52)
ANION GAP SERPL CALCULATED.3IONS-SCNC: 12 MMOL/L (ref 4–13)
APTT PPP: 30 SECONDS (ref 23–37)
AST SERPL W P-5'-P-CCNC: 23 U/L (ref 13–39)
BACTERIA UR QL AUTO: ABNORMAL /HPF
BILIRUB SERPL-MCNC: 0.4 MG/DL (ref 0.2–1)
BILIRUB UR QL STRIP: NEGATIVE
BUN SERPL-MCNC: 70 MG/DL (ref 7–25)
CALCIUM SERPL-MCNC: 8.3 MG/DL (ref 8.6–10.5)
CHLORIDE SERPL-SCNC: 95 MMOL/L (ref 98–107)
CLARITY UR: ABNORMAL
CO2 SERPL-SCNC: 25 MMOL/L (ref 21–31)
COLOR UR: YELLOW
CREAT SERPL-MCNC: 3.91 MG/DL (ref 0.7–1.3)
ERYTHROCYTE [DISTWIDTH] IN BLOOD BY AUTOMATED COUNT: 14.8 % (ref 11.5–14.5)
GFR SERPL CREATININE-BSD FRML MDRD: 17 ML/MIN/1.73SQ M
GLUCOSE SERPL-MCNC: 101 MG/DL (ref 65–99)
GLUCOSE SERPL-MCNC: 81 MG/DL (ref 65–140)
GLUCOSE SERPL-MCNC: 91 MG/DL (ref 65–140)
GLUCOSE UR STRIP-MCNC: NEGATIVE MG/DL
HCT VFR BLD AUTO: 33.3 % (ref 42–47)
HGB BLD-MCNC: 11.1 G/DL (ref 14–18)
HGB UR QL STRIP.AUTO: ABNORMAL
INR PPP: 1.15 (ref 0.9–1.5)
KETONES UR STRIP-MCNC: NEGATIVE MG/DL
LEUKOCYTE ESTERASE UR QL STRIP: ABNORMAL
LIPASE SERPL-CCNC: 15 U/L (ref 11–82)
LYMPHOCYTES # BLD AUTO: 19 % (ref 20–51)
LYMPHOCYTES # BLD AUTO: 2.45 THOUSAND/UL (ref 0.6–4.47)
MAGNESIUM SERPL-MCNC: 1.6 MG/DL (ref 1.9–2.7)
MCH RBC QN AUTO: 33.4 PG (ref 26–34)
MCHC RBC AUTO-ENTMCNC: 33.4 G/DL (ref 31–37)
MCV RBC AUTO: 100 FL (ref 81–99)
MONOCYTES # BLD AUTO: 2.71 THOUSAND/UL (ref 0–1.22)
MONOCYTES NFR BLD AUTO: 21 % (ref 4–12)
MUCOUS THREADS UR QL AUTO: ABNORMAL
NEUTS BAND NFR BLD MANUAL: 3 % (ref 0–8)
NEUTS SEG # BLD: 7.74 THOUSAND/UL (ref 1.81–6.82)
NEUTS SEG NFR BLD AUTO: 57 % (ref 42–75)
NITRITE UR QL STRIP: NEGATIVE
NON-SQ EPI CELLS URNS QL MICRO: ABNORMAL /HPF
PH UR STRIP.AUTO: 5 [PH]
PLATELET # BLD AUTO: 53 THOUSANDS/UL (ref 149–390)
PLATELET BLD QL SMEAR: ABNORMAL
PMV BLD AUTO: 10.1 FL (ref 8.6–11.7)
POTASSIUM SERPL-SCNC: 4.1 MMOL/L (ref 3.5–5.5)
PROT SERPL-MCNC: 5.7 G/DL (ref 6.4–8.9)
PROT UR STRIP-MCNC: ABNORMAL MG/DL
PROTHROMBIN TIME: 13.4 SECONDS (ref 10.2–13)
RBC # BLD AUTO: 3.32 MILLION/UL (ref 4.3–5.9)
RBC #/AREA URNS AUTO: ABNORMAL /HPF
RBC MORPH BLD: NORMAL
SODIUM SERPL-SCNC: 132 MMOL/L (ref 134–143)
SP GR UR STRIP.AUTO: >=1.03 (ref 1–1.03)
TOTAL CELLS COUNTED SPEC: 100
TROPONIN I SERPL-MCNC: <0.03 NG/ML
UROBILINOGEN UR QL STRIP.AUTO: 0.2 E.U./DL
VALPROATE SERPL-MCNC: 129.3 UG/ML (ref 50–125)
WBC # BLD AUTO: 12.9 THOUSAND/UL (ref 4.8–10.8)
WBC #/AREA URNS AUTO: ABNORMAL /HPF

## 2019-09-24 PROCEDURE — 36415 COLL VENOUS BLD VENIPUNCTURE: CPT | Performed by: EMERGENCY MEDICINE

## 2019-09-24 PROCEDURE — 74176 CT ABD & PELVIS W/O CONTRAST: CPT

## 2019-09-24 PROCEDURE — 85027 COMPLETE CBC AUTOMATED: CPT | Performed by: EMERGENCY MEDICINE

## 2019-09-24 PROCEDURE — 82948 REAGENT STRIP/BLOOD GLUCOSE: CPT

## 2019-09-24 PROCEDURE — 93005 ELECTROCARDIOGRAM TRACING: CPT

## 2019-09-24 PROCEDURE — 99223 1ST HOSP IP/OBS HIGH 75: CPT | Performed by: INTERNAL MEDICINE

## 2019-09-24 PROCEDURE — 83690 ASSAY OF LIPASE: CPT | Performed by: EMERGENCY MEDICINE

## 2019-09-24 PROCEDURE — 81001 URINALYSIS AUTO W/SCOPE: CPT | Performed by: EMERGENCY MEDICINE

## 2019-09-24 PROCEDURE — 85610 PROTHROMBIN TIME: CPT | Performed by: EMERGENCY MEDICINE

## 2019-09-24 PROCEDURE — 83735 ASSAY OF MAGNESIUM: CPT | Performed by: EMERGENCY MEDICINE

## 2019-09-24 PROCEDURE — 80164 ASSAY DIPROPYLACETIC ACD TOT: CPT | Performed by: EMERGENCY MEDICINE

## 2019-09-24 PROCEDURE — 71045 X-RAY EXAM CHEST 1 VIEW: CPT

## 2019-09-24 PROCEDURE — 96360 HYDRATION IV INFUSION INIT: CPT

## 2019-09-24 PROCEDURE — 99285 EMERGENCY DEPT VISIT HI MDM: CPT

## 2019-09-24 PROCEDURE — 70450 CT HEAD/BRAIN W/O DYE: CPT

## 2019-09-24 PROCEDURE — 96361 HYDRATE IV INFUSION ADD-ON: CPT

## 2019-09-24 PROCEDURE — 85007 BL SMEAR W/DIFF WBC COUNT: CPT | Performed by: EMERGENCY MEDICINE

## 2019-09-24 PROCEDURE — 85730 THROMBOPLASTIN TIME PARTIAL: CPT | Performed by: EMERGENCY MEDICINE

## 2019-09-24 PROCEDURE — 76770 US EXAM ABDO BACK WALL COMP: CPT

## 2019-09-24 PROCEDURE — 84484 ASSAY OF TROPONIN QUANT: CPT | Performed by: EMERGENCY MEDICINE

## 2019-09-24 PROCEDURE — 80053 COMPREHEN METABOLIC PANEL: CPT | Performed by: EMERGENCY MEDICINE

## 2019-09-24 RX ORDER — QUETIAPINE 400 MG/1
400 TABLET, FILM COATED, EXTENDED RELEASE ORAL
Status: DISCONTINUED | OUTPATIENT
Start: 2019-09-24 | End: 2019-09-24

## 2019-09-24 RX ORDER — CEFTRIAXONE 1 G/50ML
1000 INJECTION, SOLUTION INTRAVENOUS ONCE
Status: COMPLETED | OUTPATIENT
Start: 2019-09-24 | End: 2019-09-24

## 2019-09-24 RX ORDER — ONDANSETRON 2 MG/ML
4 INJECTION INTRAMUSCULAR; INTRAVENOUS ONCE
Status: DISCONTINUED | OUTPATIENT
Start: 2019-09-24 | End: 2019-09-30 | Stop reason: HOSPADM

## 2019-09-24 RX ORDER — QUETIAPINE FUMARATE 50 MG/1
100 TABLET, EXTENDED RELEASE ORAL
Status: DISCONTINUED | OUTPATIENT
Start: 2019-09-24 | End: 2019-09-30 | Stop reason: HOSPADM

## 2019-09-24 RX ORDER — QUETIAPINE 150 MG/1
300 TABLET, FILM COATED, EXTENDED RELEASE ORAL
Status: DISCONTINUED | OUTPATIENT
Start: 2019-09-24 | End: 2019-09-30 | Stop reason: HOSPADM

## 2019-09-24 RX ORDER — BENZTROPINE MESYLATE 1 MG/1
0.5 TABLET ORAL 2 TIMES DAILY
Status: DISCONTINUED | OUTPATIENT
Start: 2019-09-24 | End: 2019-09-30 | Stop reason: HOSPADM

## 2019-09-24 RX ORDER — SENNOSIDES 8.6 MG
2 TABLET ORAL DAILY PRN
Status: DISCONTINUED | OUTPATIENT
Start: 2019-09-24 | End: 2019-09-30 | Stop reason: HOSPADM

## 2019-09-24 RX ORDER — QUETIAPINE 200 MG/1
200 TABLET, FILM COATED, EXTENDED RELEASE ORAL 2 TIMES DAILY
Status: DISCONTINUED | OUTPATIENT
Start: 2019-09-24 | End: 2019-09-24

## 2019-09-24 RX ORDER — DOCUSATE SODIUM 100 MG/1
100 CAPSULE, LIQUID FILLED ORAL 2 TIMES DAILY
Status: DISCONTINUED | OUTPATIENT
Start: 2019-09-24 | End: 2019-09-30 | Stop reason: HOSPADM

## 2019-09-24 RX ORDER — CEFTRIAXONE 1 G/50ML
1000 INJECTION, SOLUTION INTRAVENOUS EVERY 24 HOURS
Status: DISCONTINUED | OUTPATIENT
Start: 2019-09-25 | End: 2019-09-28

## 2019-09-24 RX ORDER — ACETAMINOPHEN 325 MG/1
650 TABLET ORAL EVERY 6 HOURS PRN
Status: DISCONTINUED | OUTPATIENT
Start: 2019-09-24 | End: 2019-09-30 | Stop reason: HOSPADM

## 2019-09-24 RX ORDER — QUETIAPINE 150 MG/1
150 TABLET, FILM COATED, EXTENDED RELEASE ORAL 2 TIMES DAILY
Status: DISCONTINUED | OUTPATIENT
Start: 2019-09-24 | End: 2019-09-30 | Stop reason: HOSPADM

## 2019-09-24 RX ORDER — HYDROXYZINE HYDROCHLORIDE 25 MG/1
25 TABLET, FILM COATED ORAL 3 TIMES DAILY
Status: DISCONTINUED | OUTPATIENT
Start: 2019-09-24 | End: 2019-09-30 | Stop reason: HOSPADM

## 2019-09-24 RX ORDER — SODIUM CHLORIDE 9 MG/ML
100 INJECTION, SOLUTION INTRAVENOUS CONTINUOUS
Status: DISCONTINUED | OUTPATIENT
Start: 2019-09-24 | End: 2019-09-27

## 2019-09-24 RX ORDER — MAGNESIUM SULFATE 1 G/100ML
1 INJECTION INTRAVENOUS ONCE
Status: DISCONTINUED | OUTPATIENT
Start: 2019-09-24 | End: 2019-09-25

## 2019-09-24 RX ORDER — BISACODYL 10 MG
10 SUPPOSITORY, RECTAL RECTAL DAILY
Status: DISCONTINUED | OUTPATIENT
Start: 2019-09-24 | End: 2019-09-30 | Stop reason: HOSPADM

## 2019-09-24 RX ORDER — QUETIAPINE FUMARATE 50 MG/1
50 TABLET, EXTENDED RELEASE ORAL 2 TIMES DAILY
Status: DISCONTINUED | OUTPATIENT
Start: 2019-09-24 | End: 2019-09-30 | Stop reason: HOSPADM

## 2019-09-24 RX ORDER — GABAPENTIN 100 MG/1
100 CAPSULE ORAL 3 TIMES DAILY
Status: DISCONTINUED | OUTPATIENT
Start: 2019-09-24 | End: 2019-09-25

## 2019-09-24 RX ADMIN — CEFTRIAXONE 1000 MG: 1 INJECTION, SOLUTION INTRAVENOUS at 18:36

## 2019-09-24 RX ADMIN — GABAPENTIN 100 MG: 100 CAPSULE ORAL at 18:33

## 2019-09-24 RX ADMIN — QUETIAPINE FUMARATE 150 MG: 50 TABLET, EXTENDED RELEASE ORAL at 18:34

## 2019-09-24 RX ADMIN — SODIUM CHLORIDE 1000 ML: 0.9 INJECTION, SOLUTION INTRAVENOUS at 12:47

## 2019-09-24 RX ADMIN — HYDROXYZINE HYDROCHLORIDE 25 MG: 25 TABLET ORAL at 18:35

## 2019-09-24 RX ADMIN — QUETIAPINE FUMARATE 50 MG: 50 TABLET, EXTENDED RELEASE ORAL at 18:35

## 2019-09-24 RX ADMIN — QUETIAPINE FUMARATE 100 MG: 50 TABLET, EXTENDED RELEASE ORAL at 21:55

## 2019-09-24 RX ADMIN — SODIUM CHLORIDE 100 ML/HR: 9 INJECTION, SOLUTION INTRAVENOUS at 18:12

## 2019-09-24 RX ADMIN — DOCUSATE SODIUM 100 MG: 100 CAPSULE, LIQUID FILLED ORAL at 18:35

## 2019-09-24 RX ADMIN — BENZTROPINE MESYLATE 0.5 MG: 1 TABLET ORAL at 18:34

## 2019-09-24 RX ADMIN — BISACODYL 10 MG: 10 SUPPOSITORY RECTAL at 18:36

## 2019-09-24 RX ADMIN — QUETIAPINE FUMARATE 300 MG: 150 TABLET, EXTENDED RELEASE ORAL at 21:55

## 2019-09-24 NOTE — H&P
H&P- Jorge Luis Rock 1965, 48 y o  male MRN: [id number]    Unit/Bed#: ED 06 Encounter: [id number]    Primary Care Provider: HUNTER Taylor   Date and time admitted to hospital: 9/24/2019 12:01 PM        * ADRIANA (acute kidney injury) [facility])  Assessment & Plan  · Unclear etiology  · Check urine studies  · Check renal ultrasound  · Follow-up CT abdomen pelvis scan read which was ordered in the emergency department  · Consult Nephrology  · IV fluids    Toxic metabolic encephalopathy  Assessment & Plan  · Likely due to above-mentioned medical problems  · If no improvement in mental status over the next 24 hours, will consider Neurology evaluation    UTI (urinary tract infection)  Assessment & Plan  · Present on admission  · Ceftriaxone pending culture results    Thrombocytopenia ([facility] [address] )  Assessment & Plan  · Appears to be chronic issue for the patient and is followed as an outpatient  · Will hold heparin for now  · Monitor blood counts    Hypothyroidism  Assessment & Plan  · Continue Synthroid  · Last TSH from 6 weeks ago was within normal limits    Hypertension  Assessment & Plan  · Hold lisinopril in light of acute kidney injury    Generalized convulsive epilepsy ([facility] [address] )  Assessment & Plan  · Valproic acid level noted to be elevated, will hold for now  · We will hold Zonegran as well in light of acute kidney injury  · If no improvement in renal function, will consider Neurology consultation for medication adjustments    Type 2 diabetes mellitus without complication, without long-term current use of insulin ([facility] [address] )  Assessment & Plan  Lab Results   Component Value Date    HGBA1C 5 8 08/28/2019       No results for input(s): POCGLU in the last 72 hours      Blood Sugar Average: Last 72 hrs:     Hold metformin  Sliding scale insulin    VTE Prophylaxis: Pharmacologic VTE Prophylaxis contraindicated due to Thrombocytopenia  Code Status: Full  POLST: POLST is not applicable to this patient  Discussion with family:sister (POA) and caregiver at bedside    Anticipated Length of Stay:  Patient will be admitted on an Inpatient basis with an anticipated length of stay of  > 2 midnights  Justification for Hospital Stay: marv    Total Time for Visit, including Counseling / Coordination of Care: 74   Greater than 50% of this total time spent on direct patient counseling and coordination of care  Chief Complaint:   Change in mental status    History of Present Illness:    Carolina Mancia is a 48 y o  male with past medical history cerebral palsy, seizure disorder, and chronic psychiatric disorders, who presents with worsening mental status and fatigue  Majority of history was obtained from the patient's caregiver and sister who were at bedside  It appears over the weekend patient had been having gradual decline in his clinical status  His caregiver notes that he had decreased appetite, fatigue, and very little urine output  This prompted their evaluation in the emergency department  Patient appears to be indicating modest epigastric abdominal discomfort, however, otherwise unable to provide any history or any other localizing symptoms  She also notes that his last bowel movement was 3 days ago  Review of Systems:  Review of Systems   Unable to perform ROS: Mental status change       Past Medical and Surgical History:   Past Medical History:   Diagnosis Date    CP (cerebral palsy) ([facility] [address]     Disease of thyroid gland     Hypertension     Osteoporosis     Psychiatric disorder     Scoliosis     Seizures ([facility] [address] )        History reviewed  No pertinent surgical history  Meds/Allergies:  Prior to Admission medications    Medication Sig Start Date End Date Taking?  Authorizing Provider   benztropine (COGENTIN) 0 5 mg tablet TAKE 1 TABLET BY MOUTH TWICE A DAY (8AM,8PM) 4/19/19   HUNTER Murguia   calcium carbonate (OYSTER SHELL,OSCAL) 500 mg Take 1 tablet by mouth 3 (three) times a day 6/7/19   HUNTER Garcia cholecalciferol (VITAMIN D3) 1,000 units tablet Take 1 tablet (1,000 Units total) by mouth daily  Patient taking differently: Take 1,000 Units by mouth every other day  6/7/19   HUNTER Murguia   cyanocobalamin (VITAMIN B-12) 1,000 mcg tablet TAKE 1 TABLET BY MOUTH EVERY OTHER DAY (8 AM) 12/12/18   Nury Aguillon PA-C   divalproex sodium (DEPAKOTE ER) 500 mg 24 hr tablet Take 2 tablets in the AM and 3 tablets at night   5/14/18   HUNTER Murguia   docusate sodium (COLACE) 100 mg capsule TAKE 1 CAPSULE BY MOUTH TWICE A DAY (8AM,8PM) 6/28/19   HUNTER Murguia   gabapentin (NEURONTIN) 100 mg capsule Take 200mg in the AM, 100mg at 2pm and 8pm  8/30/19   HUNTER Lopes   hydrOXYzine pamoate (VISTARIL) 25 mg capsule TAKE 2 CAPSULES (50MG) BY MOUTH 3 TIMES A DAY (8AM,2PM,8PM) 4/19/19   HUNTER Murguia   ibuprofen (MOTRIN) 600 mg tablet Take 1 tablet (600 mg total) by mouth every 8 (eight) hours as needed for mild pain for up to 10 days 8/1/19 8/11/19  HUNTER Lopes   ketoconazole (NIZORAL) 2 % shampoo Wash hair and sideburns at least 3 times per week 6/7/19   HUNTER Murguia   levothyroxine 125 mcg tablet TAKE 1 TABLET BY MOUTH DAILY (8AM) 8/16/19   Johana Cardozo PA-C   lisinopril (ZESTRIL) 10 mg tablet TAKE 1 TABLET BY MOUTH DAILY (8AM) 9/23/19   HUNTER Lopes   loratadine (ALLERGY) 10 mg tablet Take 1 tablet (10 mg total) by mouth daily Take 1 tablet by mouth daily PRN 6/7/19   HUNTER Murguia   metFORMIN (GLUCOPHAGE) 1000 MG tablet Take 1 tablet (1,000 mg total) by mouth 2 (two) times a day with meals 6/7/19   HUNTER Murguia   multivitamin (THERAGRAN) TABS Take 1 tablet by mouth daily 6/7/19   HUNTER Murguia   QUEtiapine (SEROquel XR) 200 mg 24 hr tablet TAKE 1 TABLET BY MOUTH TWICE A DAY (8AM,2PM) 8/26/19   HUNTER Lopes   QUEtiapine (SEROquel XR) 400 mg 24 hr tablet TAKE 1 TABLET BY MOUTH AT BEDTIME (8PM) (DX: ANTIPSYCHOTIC) 9/17/19 HUNTER Roland   simvastatin (ZOCOR) 40 mg tablet Take 1 tablet (40 mg total) by mouth daily 5/1/19   HUNTER Murguia   zonisamide (ZONEGRAN) 100 mg capsule TAKE 6 CAPSULES (600MG) BY MOUTH DAILY AT BEDTIME (8PM) 6/27/19   HUNTER Murguia     I have reviewed home medications with patient family member  Allergies: Allergies   Allergen Reactions    Erythromycin     Penicillins        Social History:  Marital Status: Single   Occupation: n/a  Patient Pre-hospital Living Situation: home  Patient Pre-hospital Level of Mobility: limited  Patient Pre-hospital Diet Restrictions: diabetic  Substance Use History:     Social History     Substance and Sexual Activity   Alcohol Use No     Social History     Tobacco Use   Smoking Status Never Smoker   Smokeless Tobacco Never Used     Social History     Substance and Sexual Activity   Drug Use No       Family History:  I have reviewed the patients family history    Physical Exam:   Vitals:   Blood Pressure: 100/60 (09/24/19 1205)  Pulse: 99 (09/24/19 1205)  Temperature: 97 6 °F (36 4 °C) (09/24/19 1202)  Temp Source: Temporal (09/24/19 1205)  Respirations: 18 (09/24/19 1205)  Height: 5' 10" (177 8 cm) (09/24/19 1205)  Weight - Scale: 80 kg (176 lb 5 9 oz) (09/24/19 1205)  SpO2: 95 % (09/24/19 1205)    Physical Exam   Constitutional: He appears well-developed and well-nourished  HENT:   Head: Normocephalic and atraumatic  Mouth/Throat: Oropharynx is clear and moist    Eyes: Pupils are equal, round, and reactive to light  EOM are normal    Neck: Normal range of motion  Neck supple  Cardiovascular: Normal rate and regular rhythm  Pulmonary/Chest: Effort normal and breath sounds normal    Abdominal: Soft  Bowel sounds are normal  There is tenderness  Musculoskeletal: Normal range of motion  Neurological: He is alert  Oriented to person only   Skin: Skin is warm  Capillary refill takes less than 2 seconds     Psychiatric:   Unable to assess   Nursing note and vitals reviewed  Additional Data:   Lab Results: I have personally reviewed pertinent reports  Results from last 7 days   Lab Units 09/24/19  1302   WBC Thousand/uL 12 90*   HEMOGLOBIN g/dL 11 1*   HEMATOCRIT % 33 3*   PLATELETS Thousands/uL 53*   LYMPHO PCT % 19*   MONO PCT % 21*     Results from last 7 days   Lab Units 09/24/19  1302   POTASSIUM mmol/L 4 1   CHLORIDE mmol/L 95*   CO2 mmol/L 25   BUN mg/dL 70*   CREATININE mg/dL 3 91*   CALCIUM mg/dL 8 3*   ALK PHOS U/L 45   ALT U/L 10   AST U/L 23     Results from last 7 days   Lab Units 09/24/19  1302   INR  1 15               Imaging: I have personally reviewed pertinent reports  CT head without contrast   Final Result by Anita Cesar MD (09/24 1439)      No acute intracranial abnormality  Precocious volume loss disproportionate within the cerebellum  Workstation performed: [id number]         XR chest 1 view portable    (Results Pending)   CT abdomen pelvis wo contrast    (Results Pending)       EKG, Pathology, and Other Studies Reviewed on Admission:   · EKG: n/a    NetAccess/Epic Records Reviewed: Yes     ** Please Note: This note has been constructed using a voice recognition system   **

## 2019-09-24 NOTE — ASSESSMENT & PLAN NOTE
· Likely due to above-mentioned medical problems  · If no improvement in mental status over the next 24 hours, will consider Neurology evaluation

## 2019-09-24 NOTE — ASSESSMENT & PLAN NOTE
Lab Results   Component Value Date    HGBA1C 5 8 08/28/2019       No results for input(s): POCGLU in the last 72 hours      Blood Sugar Average: Last 72 hrs:     Hold metformin  Sliding scale insulin

## 2019-09-24 NOTE — PLAN OF CARE
Problem: Prexisting or High Potential for Compromised Skin Integrity  Goal: Skin integrity is maintained or improved  Description  INTERVENTIONS:  - Identify patients at risk for skin breakdown  - Assess and monitor skin integrity  - Assess and monitor nutrition and hydration status  - Monitor labs   - Assess for incontinence   - Turn and reposition patient  - Assist with mobility/ambulation  - Relieve pressure over bony prominences  - Avoid friction and shearing  - Provide appropriate hygiene as needed including keeping skin clean and dry  - Evaluate need for skin moisturizer/barrier cream  - Collaborate with interdisciplinary team   - Patient/family teaching  - Consider wound care consult   Outcome: Progressing     Problem: Potential for Falls  Goal: Patient will remain free of falls  Description  INTERVENTIONS:  - Assess patient frequently for physical needs  -  Identify cognitive and physical deficits and behaviors that affect risk of falls    -  [place] fall precautions as indicated by assessment   - Educate patient/family on patient safety including physical limitations  - Instruct patient to call for assistance with activity based on assessment  - Modify environment to reduce risk of injury  - Consider OT/PT consult to assist with strengthening/mobility  Outcome: Progressing     Problem: PAIN - ADULT  Goal: Verbalizes/displays adequate comfort level or baseline comfort level  Description  Interventions:  - Encourage patient to monitor pain and request assistance  - Assess pain using appropriate pain scale  - Administer analgesics based on type and severity of pain and evaluate response  - Implement non-pharmacological measures as appropriate and evaluate response  - Consider cultural and social influences on pain and pain management  - Notify physician/advanced practitioner if interventions unsuccessful or patient reports new pain  Outcome: Progressing     Problem: INFECTION - ADULT  Goal: Absence or prevention of progression during hospitalization  Description  INTERVENTIONS:  - Assess and monitor for signs and symptoms of infection  - Monitor lab/diagnostic results  - Monitor all insertion sites, i e  indwelling lines, tubes, and drains  - Monitor endotracheal if appropriate and nasal secretions for changes in amount and color  - [place] appropriate cooling/warming therapies per order  - Administer medications as ordered  - Instruct and encourage patient and family to use good hand hygiene technique  - Identify and instruct in appropriate isolation precautions for identified infection/condition  Outcome: Progressing     Problem: SAFETY ADULT  Goal: Maintain or return to baseline ADL function  Description  INTERVENTIONS:  -  Assess patient's ability to carry out ADLs; assess patient's baseline for ADL function and identify physical deficits which impact ability to perform ADLs (bathing, care of mouth/teeth, toileting, grooming, dressing, etc )  - Assess/evaluate cause of self-care deficits   - Assess range of motion  - Assess patient's mobility; develop plan if impaired  - Assess patient's need for assistive devices and provide as appropriate  - Encourage maximum independence but intervene and supervise when necessary  - Involve family in performance of ADLs  - Assess for home care needs following discharge   - Consider OT consult to assist with ADL evaluation and planning for discharge  - Provide patient education as appropriate  Outcome: Progressing  Goal: Maintain or return mobility status to optimal level  Description  INTERVENTIONS:  - Assess patient's baseline mobility status (ambulation, transfers, stairs, etc )    - Identify cognitive and physical deficits and behaviors that affect mobility  - Identify mobility aids required to assist with transfers and/or ambulation (gait belt, sit-to-stand, lift, walker, cane, etc )  - [place] fall precautions as indicated by assessment  - Record patient progress and toleration of activity level on Mobility SBAR; progress patient to next Phase/Stage  - Instruct patient to call for assistance with activity based on assessment  - Consider rehabilitation consult to assist with strengthening/weightbearing, etc   Outcome: Progressing     Problem: DISCHARGE PLANNING  Goal: Discharge to home or other facility with appropriate resources  Description  INTERVENTIONS:  - Identify barriers to discharge w/patient and caregiver  - Arrange for needed discharge resources and transportation as appropriate  - Identify discharge learning needs (meds, wound care, etc )  - Arrange for interpretive services to assist at discharge as needed  - Refer to Case Management Department for coordinating discharge planning if the patient needs post-hospital services based on physician/advanced practitioner order or complex needs related to functional status, cognitive ability, or social support system  Outcome: Progressing     Problem: Knowledge Deficit  Goal: Patient/family/caregiver demonstrates understanding of disease process, treatment plan, medications, and discharge instructions  Description  Complete learning assessment and assess knowledge base    Interventions:  - Provide teaching at level of understanding  - Provide teaching via preferred learning methods  Outcome: Progressing     Problem: NEUROSENSORY - ADULT  Goal: Remains free of injury related to seizures activity  Description  INTERVENTIONS  - Maintain airway, patient safety  and administer oxygen as ordered  - Monitor patient for seizure activity, document and report duration and description of seizure to physician/advanced practitioner  - If seizure occurs,  ensure patient safety during seizure  - Reorient patient post seizure  - Seizure pads on all 4 side rails  - Instruct patient/family to notify RN of any seizure activity including if an aura is experienced  - Instruct patient/family to call for assistance with activity based on nursing assessment  - Administer anti-seizure medications if ordered    Outcome: Progressing     Problem: GASTROINTESTINAL - ADULT  Goal: Minimal or absence of nausea and/or vomiting  Description  INTERVENTIONS:  - Administer IV fluids if ordered to ensure adequate hydration  - Maintain NPO status until nausea and vomiting are resolved  - Nasogastric tube if ordered  - Administer ordered antiemetic medications as needed  - Provide nonpharmacologic comfort measures as appropriate  - Advance diet as tolerated, if ordered  - Consider nutrition services referral to assist patient with adequate nutrition and appropriate food choices  Outcome: Progressing  Goal: Maintains or returns to baseline bowel function  Description  INTERVENTIONS:  - Assess bowel function  - Encourage oral fluids to ensure adequate hydration  - Administer IV fluids if ordered to ensure adequate hydration  - Administer ordered medications as needed  - Encourage mobilization and activity  - Consider nutritional services referral to assist patient with adequate nutrition and appropriate food choices  Outcome: Progressing  Goal: Maintains adequate nutritional intake  Description  INTERVENTIONS:  - Monitor percentage of each meal consumed  - Identify factors contributing to decreased intake, treat as appropriate  - Assist with meals as needed  - Monitor I&O, weight, and lab values if indicated  - Obtain nutrition services referral as needed  Outcome: Progressing     Problem: GENITOURINARY - ADULT  Goal: Maintains or returns to baseline urinary function  Description  INTERVENTIONS:  - Assess urinary function  - Encourage oral fluids to ensure adequate hydration if ordered  - Administer IV fluids as ordered to ensure adequate hydration  - Administer ordered medications as needed  - Offer frequent toileting  - Follow urinary retention protocol if ordered  Outcome: Progressing  Goal: Absence of urinary retention  Description  INTERVENTIONS:  - Assess patients ability to void and empty bladder  - Monitor I/O  - Bladder scan as needed  - Discuss with physician/AP medications to alleviate retention as needed  - Discuss catheterization for long term situations as appropriate  Outcome: Progressing     Problem: METABOLIC, FLUID AND ELECTROLYTES - ADULT  Goal: Glucose maintained within target range  Description  INTERVENTIONS:  - Monitor Blood Glucose as ordered  - Assess for signs and symptoms of hyperglycemia and hypoglycemia  - Administer ordered medications to maintain glucose within target range  - Assess nutritional intake and initiate nutrition service referral as needed  Outcome: Progressing     Problem: SKIN/TISSUE INTEGRITY - ADULT  Goal: Skin integrity remains intact  Description  INTERVENTIONS  - Identify patients at risk for skin breakdown  - Assess and monitor skin integrity  - Assess and monitor nutrition and hydration status  - Monitor labs (i e  albumin)  - Assess for incontinence   - Turn and reposition patient  - Assist with mobility/ambulation  - Relieve pressure over bony prominences  - Avoid friction and shearing  - Provide appropriate hygiene as needed including keeping skin clean and dry  - Evaluate need for skin moisturizer/barrier cream  - Collaborate with interdisciplinary team (i e  Nutrition, Rehabilitation, etc )   - Patient/family teaching  Outcome: Progressing     Problem: MUSCULOSKELETAL - ADULT  Goal: Maintain or return mobility to safest level of function  Description  INTERVENTIONS:  - Assess patient's ability to carry out ADLs; assess patient's baseline for ADL function and identify physical deficits which impact ability to perform ADLs (bathing, care of mouth/teeth, toileting, grooming, dressing, etc )  - Assess/evaluate cause of self-care deficits   - Assess range of motion  - Assess patient's mobility  - Assess patient's need for assistive devices and provide as appropriate  - Encourage maximum independence but intervene and supervise when necessary  - Involve family in performance of ADLs  - Assess for home care needs following discharge   - Consider OT consult to assist with ADL evaluation and planning for discharge  - Provide patient education as appropriate  Outcome: Progressing

## 2019-09-24 NOTE — ASSESSMENT & PLAN NOTE
· Unclear etiology  · Check urine studies  · Check renal ultrasound  · Follow-up CT abdomen pelvis scan read which was ordered in the emergency department  · Consult Nephrology  · IV fluids

## 2019-09-24 NOTE — ASSESSMENT & PLAN NOTE
· Appears to be chronic issue for the patient and is followed as an outpatient  · Will hold heparin for now  · Monitor blood counts

## 2019-09-24 NOTE — ASSESSMENT & PLAN NOTE
· Valproic acid level noted to be elevated, will hold for now  · We will hold Zonegran as well in light of acute kidney injury  · If no improvement in renal function, will consider Neurology consultation for medication adjustments

## 2019-09-24 NOTE — ED PROVIDER NOTES
History  Chief Complaint   Patient presents with    Vomiting    Weakness - Generalized     states dosent feel well, unable to ambulate at home      AMS, from home, hx cerebral palsy and seizure, normally more vocal and interactive  No reported fever, vomiting/diarrhea, or focal drooping or weakness  Hx/ROS/exam from patient limited do to baseline cognitive impairment and AMS>           Prior to Admission Medications   Prescriptions Last Dose Informant Patient Reported? Taking? QUEtiapine (SEROquel XR) 200 mg 24 hr tablet   No No   Sig: TAKE 1 TABLET BY MOUTH TWICE A DAY (8AM,2PM)   QUEtiapine (SEROquel XR) 400 mg 24 hr tablet   No No   Sig: TAKE 1 TABLET BY MOUTH AT BEDTIME (8PM) (DX: ANTIPSYCHOTIC)   benztropine (COGENTIN) 0 5 mg tablet   No No   Sig: TAKE 1 TABLET BY MOUTH TWICE A DAY (8AM,8PM)   calcium carbonate (OYSTER SHELL,OSCAL) 500 mg   No No   Sig: Take 1 tablet by mouth 3 (three) times a day   cholecalciferol (VITAMIN D3) 1,000 units tablet  Other (Specify) No No   Sig: Take 1 tablet (1,000 Units total) by mouth daily   Patient taking differently: Take 1,000 Units by mouth every other day    cyanocobalamin (VITAMIN B-12) 1,000 mcg tablet   No No   Sig: TAKE 1 TABLET BY MOUTH EVERY OTHER DAY (8 AM)   divalproex sodium (DEPAKOTE ER) 500 mg 24 hr tablet   No No   Sig: Take 2 tablets in the AM and 3 tablets at night     docusate sodium (COLACE) 100 mg capsule   No No   Sig: TAKE 1 CAPSULE BY MOUTH TWICE A DAY (8AM,8PM)   gabapentin (NEURONTIN) 100 mg capsule   No No   Sig: Take 200mg in the AM, 100mg at 2pm and 8pm    hydrOXYzine pamoate (VISTARIL) 25 mg capsule   No No   Sig: TAKE 2 CAPSULES (50MG) BY MOUTH 3 TIMES A DAY (8AM,2PM,8PM)   ibuprofen (MOTRIN) 600 mg tablet   No No   Sig: Take 1 tablet (600 mg total) by mouth every 8 (eight) hours as needed for mild pain for up to 10 days   ketoconazole (NIZORAL) 2 % shampoo   No No   Sig: Wash hair and sideburns at least 3 times per week   levothyroxine 125 mcg tablet   No No   Sig: TAKE 1 TABLET BY MOUTH DAILY (8AM)   lisinopril (ZESTRIL) 10 mg tablet   No No   Sig: TAKE 1 TABLET BY MOUTH DAILY (8AM)   loratadine (ALLERGY) 10 mg tablet   No No   Sig: Take 1 tablet (10 mg total) by mouth daily Take 1 tablet by mouth daily PRN   metFORMIN (GLUCOPHAGE) 1000 MG tablet   No No   Sig: Take 1 tablet (1,000 mg total) by mouth 2 (two) times a day with meals   multivitamin (THERAGRAN) TABS   No No   Sig: Take 1 tablet by mouth daily   simvastatin (ZOCOR) 40 mg tablet   No No   Sig: Take 1 tablet (40 mg total) by mouth daily   zonisamide (ZONEGRAN) 100 mg capsule   No No   Sig: TAKE 6 CAPSULES (600MG) BY MOUTH DAILY AT BEDTIME (8PM)      Facility-Administered Medications: None       Past Medical History:   Diagnosis Date    [facility])     Disease of thyroid gland     Hypertension     Osteoporosis     Psychiatric disorder     Scoliosis     Seizures ([facility] [address] )        History reviewed  No pertinent surgical history  History reviewed  No pertinent family history  I have reviewed and agree with the history as documented  Social History     Tobacco Use    Smoking status: Never Smoker    Smokeless tobacco: Never Used   Substance Use Topics    Alcohol use: No    Drug use: No        Review of Systems   Unable to perform ROS: Mental status change       Physical Exam  Physical Exam   Constitutional: He appears well-developed and well-nourished  HENT:   Nose: Nose normal    Mouth/Throat: Oropharynx is clear and moist  No oropharyngeal exudate  Eyes: Pupils are equal, round, and reactive to light  Conjunctivae and EOM are normal  No scleral icterus  Neck: Normal range of motion  Neck supple  No JVD present  No tracheal deviation present  Cardiovascular: Normal rate, regular rhythm and normal heart sounds  No murmur heard  Pulmonary/Chest: Effort normal and breath sounds normal  No respiratory distress  He has no wheezes  He has no rales     Abdominal: Soft  Bowel sounds are normal  There is no tenderness  There is no guarding  Musculoskeletal: Normal range of motion  He exhibits no edema or tenderness  Neurological: He is alert  No cranial nerve deficit or sensory deficit  He exhibits normal muscle tone  5/5 motor, nl sens   Skin: Skin is warm and dry  Nursing note and vitals reviewed        Vital Signs  ED Triage Vitals   Temperature Pulse Respirations Blood Pressure SpO2   09/24/19 1202 09/24/19 1205 09/24/19 1205 09/24/19 1205 09/24/19 1205   97 6 °F (36 4 °C) 99 18 100/60 95 %      Temp Source Heart Rate Source Patient Position - Orthostatic VS BP Location FiO2 (%)   09/24/19 1202 09/24/19 1205 09/24/19 1205 09/24/19 1205 --   Temporal Monitor Sitting Left arm       Pain Score       09/24/19 1205       No Pain           Vitals:    09/24/19 1205 09/24/19 1546   BP: 100/60 102/60   Pulse: 99 102   Patient Position - Orthostatic VS: Sitting Lying         Visual Acuity      ED Medications  Medications   ondansetron (ZOFRAN) injection 4 mg (has no administration in time range)   magnesium sulfate IVPB (premix) SOLN 1 g (has no administration in time range)   cefTRIAXone (ROCEPHIN) IVPB (premix) 1,000 mg (has no administration in time range)   benztropine (COGENTIN) tablet 0 5 mg (has no administration in time range)   docusate sodium (COLACE) capsule 100 mg (has no administration in time range)   gabapentin (NEURONTIN) capsule 100 mg (has no administration in time range)   hydrOXYzine HCL (ATARAX) tablet 25 mg (has no administration in time range)   levothyroxine tablet 125 mcg (has no administration in time range)   QUEtiapine (SEROquel XR) 24 hr tablet 200 mg (has no administration in time range)   insulin lispro (HumaLOG) 100 units/mL subcutaneous injection 1-6 Units (has no administration in time range)   insulin lispro (HumaLOG) 100 units/mL subcutaneous injection 1-5 Units (has no administration in time range)   acetaminophen (TYLENOL) tablet 650 mg (has no administration in time range)   sodium chloride 0 9 % infusion (has no administration in time range)   cefTRIAXone (ROCEPHIN) IVPB (premix) 1,000 mg (has no administration in time range)   bisacodyl (DULCOLAX) rectal suppository 10 mg (has no administration in time range)   QUEtiapine (SEROquel XR) 400 mg (has no administration in time range)   senna (SENOKOT) tablet 17 2 mg (has no administration in time range)   sodium chloride 0 9 % bolus 1,000 mL (1,000 mL Intravenous New Bag 9/24/19 1247)       Diagnostic Studies  Results Reviewed     Procedure Component Value Units Date/Time    Troponin I [[id number]]  (Normal) Collected:  09/24/19 1302    Lab Status:  Final result Specimen:  Blood from Arm, Right Updated:  09/24/19 1459     Troponin I <0 03 ng/mL     CBC and differential [[id number]]  (Abnormal) Collected:  09/24/19 1302    Lab Status:  Final result Specimen:  Blood from Arm, Right Updated:  09/24/19 1418     WBC 12 90 Thousand/uL      RBC 3 32 Million/uL      Hemoglobin 11 1 g/dL      Hematocrit 33 3 %       fL      MCH 33 4 pg      MCHC 33 4 g/dL      RDW 14 8 %      MPV 10 1 fL      Platelets 53 Thousands/uL     Urine Microscopic [[id number]]  (Abnormal) Collected:  09/24/19 1324    Lab Status:  Final result Specimen:  Urine, Straight Cath Updated:  09/24/19 1411     RBC, UA 4-10 /hpf      WBC, UA 4-10 /hpf      Epithelial Cells Occasional /hpf      Bacteria, UA Occasional /hpf      MUCUS THREADS Occasional    UA w Reflex to Microscopic w Reflex to Culture [[id number]]  (Abnormal) Collected:  09/24/19 1324    Lab Status:  Final result Specimen:  Urine, Straight Cath Updated:  09/24/19 1348     Color, UA Yellow     Clarity, UA Slightly Cloudy     Specific Gravity, UA >=1 030     pH, UA 5 0     Leukocytes, UA Trace     Nitrite, UA Negative     Protein, UA 2+ mg/dl      Glucose, UA Negative mg/dl      Ketones, UA Negative mg/dl      Urobilinogen, UA 0 2 E U /dl      Bilirubin, UA Negative Blood, UA 3+    Valproic acid level, total [[id number]]  (Abnormal) Collected:  09/24/19 1302    Lab Status:  Final result Specimen:  Blood from Arm, Right Updated:  09/24/19 1348     Valproic Acid, Total 129 3 ug/mL     Comprehensive metabolic panel [[id number]]  (Abnormal) Collected:  09/24/19 1302    Lab Status:  Final result Specimen:  Blood from Arm, Right Updated:  09/24/19 1348     Sodium 132 mmol/L      Potassium 4 1 mmol/L      Chloride 95 mmol/L      CO2 25 mmol/L      ANION GAP 12 mmol/L      BUN 70 mg/dL      Creatinine 3 91 mg/dL      Glucose 101 mg/dL      Calcium 8 3 mg/dL      AST 23 U/L      ALT 10 U/L      Alkaline Phosphatase 45 U/L      Total Protein 5 7 g/dL      Albumin 3 2 g/dL      Total Bilirubin 0 40 mg/dL      eGFR 17 ml/min/1 73sq m     Narrative:       [place] guidelines for Chronic Kidney Disease (CKD):     Stage 1 with normal or high GFR (GFR > 90 mL/min/1 73 square meters)    Stage 2 Mild CKD (GFR = 60-89 mL/min/1 73 square meters)    Stage 3A Moderate CKD (GFR = 45-59 mL/min/1 73 square meters)    Stage 3B Moderate CKD (GFR = 30-44 mL/min/1 73 square meters)    Stage 4 Severe CKD (GFR = 15-29 mL/min/1 73 square meters)    Stage 5 End Stage CKD (GFR <15 mL/min/1 73 square meters)  Note: GFR calculation is accurate only with a steady state creatinine    Magnesium [[id number]]  (Abnormal) Collected:  09/24/19 1302    Lab Status:  Final result Specimen:  Blood from Arm, Right Updated:  09/24/19 1348     Magnesium 1 6 mg/dL     Lipase [[id number]]  (Normal) Collected:  09/24/19 1302    Lab Status:  Final result Specimen:  Blood from Arm, Right Updated:  09/24/19 1348     Lipase 15 u/L     Protime-INR [[id number]]  (Abnormal) Collected:  09/24/19 1302    Lab Status:  Final result Specimen:  Blood from Arm, Right Updated:  09/24/19 1337     Protime 13 4 seconds      INR 1 15    APTT [[id number]]  (Normal) Collected:  09/24/19 1302    Lab Status:  Final result Specimen:  Blood from Arm, Right Updated:  09/24/19 1337     PTT 30 seconds                  CT abdomen pelvis wo contrast   Final Result by Srikanth Ware MD (09/24 1462)      Bilateral renal calculi without hydronephrosis or hydroureter  There is no bladder dilatation  Perinephric stranding is seen bilaterally and is nonspecific though occasionally pyelonephritis can present this way  Extensive fecal stasis does raise the possibility of fecal impaction should be correlated with any recent bowel movement  No obvious bowel wall thickening or inflammatory changes elsewhere  Immediate finding was noted in the Epic system  Workstation performed: [id number]         CT head without contrast   Final Result by Mae Betancourt MD (09/24 1433)      No acute intracranial abnormality  Precocious volume loss disproportionate within the cerebellum  Workstation performed: [id number]         XR chest 1 view portable   Final Result by Keron Garner DO (09/24 1608)   Diminished lung volumes  Bibasilar atelectasis  No lobar consolidation or large effusion  Workstation performed: [id number]         US kidney and bladder    (Results Pending)              Procedures  Procedures       ED Course                               MDM  Number of Diagnoses or Management Options  Acute renal failure (ARF) ([facility] [address] ):   UTI (urinary tract infection):    Valproic acid toxicity:   Diagnosis management comments: Impression/MDM: AMS, difficult baseline to evaluate; wide ddx included dehydration, electrolyte abnormality, UTI, supratherapeutic Depakote level; screen with CT head/brain, labs and urine, and noted was acute renal failure, UTI and supratherapeutic Depakote level; given IVF and Rocephin IV; discussed w/Dr Kasandra Vargas and patient admitted and is ok to get CT ab/pelvis on-way to inpatient room       Disposition  Final diagnoses:   Acute renal failure (ARF) (HCC)   Valproic acid toxicity   UTI (urinary tract infection)     Time reflects when diagnosis was documented in both MDM as applicable and the Disposition within this note     Time User Action Codes Description Comment    9/24/2019  2:50 PM Kobe Solomon Add [N17 9] [facility]     9/24/2019  2:51 PM Kobe Solomon Add [E38 [id number]] Valproic acid toxicity     9/24/2019  2:51 PM Kobe Solomon Add [N39 0] UTI (urinary tract infection)     9/24/2019  4:27 PM Serjio Pascual Add [N20 0] Nephrolithiasis       ED Disposition     ED Disposition Condition Date/Time Comment    Admit Stable Tue Sep 24, 2019  2:50 PM Case was discussed with Dr Jane Rivas and the patient's admission status was agreed to be Admission Status: inpatient status to the service of Dr Jane Rivas   Follow-up Information    None         Current Discharge Medication List      CONTINUE these medications which have NOT CHANGED    Details   benztropine (COGENTIN) 0 5 mg tablet TAKE 1 TABLET BY MOUTH TWICE A DAY (8AM,8PM)  Qty: 56 tablet, Refills: 5    Comments: REQUESTING REFILLS FOR PATIENTS CHRONIC CYCLE MEDS  THANK YOU  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[facility] )      calcium carbonate (OYSTER SHELL,OSCAL) 500 mg Take 1 tablet by mouth 3 (three) times a day  Qty: 270 tablet, Refills: 0    Associated Diagnoses: Diabetes mellitus due to underlying condition with hyperosmolarity without coma, without long-term current use of insulin ([facility] ); Hyperlipidemia, unspecified hyperlipidemia type      cholecalciferol (VITAMIN D3) 1,000 units tablet Take 1 tablet (1,000 Units total) by mouth daily  Qty: 90 tablet, Refills: 1    Associated Diagnoses: Vitamin D deficiency      cyanocobalamin (VITAMIN B-12) 1,000 mcg tablet TAKE 1 TABLET BY MOUTH EVERY OTHER DAY (8 AM)  Qty: 180 tablet, Refills: 0    Associated Diagnoses: B12 deficiency      divalproex sodium (DEPAKOTE ER) 500 mg 24 hr tablet Take 2 tablets in the AM and 3 tablets at night    Qty: 450 tablet, Refills: 0 Associated Diagnoses: Seizure ([address] )      docusate sodium (COLACE) 100 mg capsule TAKE 1 CAPSULE BY MOUTH TWICE A DAY (8AM,8PM)  Qty: 56 capsule, Refills: 4    Comments: NEED A NEW PRESCRIPTION FOR PATIENTS CHRONIC MEDICATION  THANK YOU FOR YOUR TIME  Associated Diagnoses: Constipation, unspecified constipation type      gabapentin (NEURONTIN) 100 mg capsule Take 200mg in the AM, 100mg at 2pm and 8pm   Qty: 120 capsule, Refills: 1    Associated Diagnoses: Neuropathy      hydrOXYzine pamoate (VISTARIL) 25 mg capsule TAKE 2 CAPSULES (50MG) BY MOUTH 3 TIMES A DAY (8AM,2PM,8PM)  Qty: 168 capsule, Refills: 5    Comments: REQUESTING REFILLS FOR PATIENTS CHRONIC CYCLE MEDS  THANK YOU  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HCC)      ibuprofen (MOTRIN) 600 mg tablet Take 1 tablet (600 mg total) by mouth every 8 (eight) hours as needed for mild pain for up to 10 days  Qty: 30 tablet, Refills: 0    Associated Diagnoses: Acute left ankle pain      ketoconazole (NIZORAL) 2 % shampoo Wash hair and sideburns at least 3 times per week  Qty: 120 mL, Refills: 3    Associated Diagnoses: Seborrheic dermatitis of scalp      levothyroxine 125 mcg tablet TAKE 1 TABLET BY MOUTH DAILY (8AM)  Qty: 12 tablet, Refills: 10    Associated Diagnoses: Hypothyroidism due to Hashimoto's thyroiditis      lisinopril (ZESTRIL) 10 mg tablet TAKE 1 TABLET BY MOUTH DAILY (8AM)  Qty: 28 tablet, Refills: 10    Associated Diagnoses: Hypertension, unspecified type      loratadine (ALLERGY) 10 mg tablet Take 1 tablet (10 mg total) by mouth daily Take 1 tablet by mouth daily PRN  Qty: 90 tablet, Refills: 0    Associated Diagnoses:  Allergic rhinitis, unspecified seasonality, unspecified trigger      metFORMIN (GLUCOPHAGE) 1000 MG tablet Take 1 tablet (1,000 mg total) by mouth 2 (two) times a day with meals  Qty: 180 tablet, Refills: 1    Associated Diagnoses: Diabetes mellitus due to underlying condition with hyperosmolarity without coma, without long-term current use of insulin (HCC)      multivitamin (THERAGRAN) TABS Take 1 tablet by mouth daily  Qty: 90 tablet, Refills: 0    Associated Diagnoses: Diabetes mellitus due to underlying condition with hyperosmolarity without coma, without long-term current use of insulin ([facility] ); Seizure ([facility] ); Neuropathy; Hyperlipidemia, unspecified hyperlipidemia type; Hypertension, unspecified type      !! QUEtiapine (SEROquel XR) 200 mg 24 hr tablet TAKE 1 TABLET BY MOUTH TWICE A DAY (8AM,2PM)  Qty: 16 tablet, Refills: 10    Associated Diagnoses: Generalized convulsive epilepsy ([facility] [address] ); Behavior concern in adult      !! QUEtiapine (SEROquel XR) 400 mg 24 hr tablet TAKE 1 TABLET BY MOUTH AT BEDTIME (8PM) (DX: ANTIPSYCHOTIC)  Qty: 28 tablet, Refills: 10    Associated Diagnoses: Generalized convulsive epilepsy ([email or web address] ); Behavior concern in adult      simvastatin (ZOCOR) 40 mg tablet Take 1 tablet (40 mg total) by mouth daily  Qty: 28 tablet, Refills: 5    Comments: REQUESTING REFILLS FOR PATIENTS CHRONIC CYCLE MEDICATIONS  THANK YOU! Associated Diagnoses: Hyperlipidemia, unspecified hyperlipidemia type      zonisamide (ZONEGRAN) 100 mg capsule TAKE 6 CAPSULES (600MG) BY MOUTH DAILY AT BEDTIME (8PM)  Qty: 168 capsule, Refills: 5    Associated Diagnoses: Seizure ([email or web address] )       ! ! - Potential duplicate medications found  Please discuss with provider  No discharge procedures on file      ED Provider  Electronically Signed by           Ken Scott MD  09/24/19 5276

## 2019-09-25 PROBLEM — N39.0 SEPSIS DUE TO URINARY TRACT INFECTION (HCC): Status: ACTIVE | Noted: 2019-09-25

## 2019-09-25 PROBLEM — K59.09 OTHER CONSTIPATION: Status: ACTIVE | Noted: 2019-09-25

## 2019-09-25 PROBLEM — N17.9 AKI (ACUTE KIDNEY INJURY) (HCC): Status: RESOLVED | Noted: 2019-09-24 | Resolved: 2019-09-25

## 2019-09-25 PROBLEM — A41.9 SEPSIS DUE TO URINARY TRACT INFECTION (HCC): Status: ACTIVE | Noted: 2019-09-25

## 2019-09-25 PROBLEM — N30.01 ACUTE CYSTITIS WITH HEMATURIA: Status: ACTIVE | Noted: 2019-09-24

## 2019-09-25 PROBLEM — E86.0 ACUTE DEHYDRATION: Status: ACTIVE | Noted: 2019-09-25

## 2019-09-25 LAB
ALBUMIN SERPL BCP-MCNC: 2.8 G/DL (ref 3.5–5.7)
ALP SERPL-CCNC: 38 U/L (ref 40–150)
ALT SERPL W P-5'-P-CCNC: 9 U/L (ref 7–52)
ANION GAP SERPL CALCULATED.3IONS-SCNC: 12 MMOL/L (ref 4–13)
AST SERPL W P-5'-P-CCNC: 17 U/L (ref 13–39)
ATRIAL RATE: 95 BPM
BILIRUB SERPL-MCNC: 0.3 MG/DL (ref 0.2–1)
BUN SERPL-MCNC: 81 MG/DL (ref 7–25)
C3 SERPL-MCNC: 126 MG/DL (ref 90–180)
C4 SERPL-MCNC: 27 MG/DL (ref 10–40)
CALCIUM SERPL-MCNC: 8.4 MG/DL (ref 8.6–10.5)
CHLORIDE SERPL-SCNC: 98 MMOL/L (ref 98–107)
CO2 SERPL-SCNC: 22 MMOL/L (ref 21–31)
CREAT SERPL-MCNC: 3.89 MG/DL (ref 0.7–1.3)
CREAT UR-MCNC: 237 MG/DL
ERYTHROCYTE [DISTWIDTH] IN BLOOD BY AUTOMATED COUNT: 14.7 % (ref 11.5–14.5)
GFR SERPL CREATININE-BSD FRML MDRD: 17 ML/MIN/1.73SQ M
GLUCOSE SERPL-MCNC: 130 MG/DL (ref 65–140)
GLUCOSE SERPL-MCNC: 145 MG/DL (ref 65–140)
GLUCOSE SERPL-MCNC: 75 MG/DL (ref 65–140)
GLUCOSE SERPL-MCNC: 80 MG/DL (ref 65–99)
GLUCOSE SERPL-MCNC: 83 MG/DL (ref 65–140)
HCT VFR BLD AUTO: 30.7 % (ref 42–47)
HGB BLD-MCNC: 10.6 G/DL (ref 14–18)
IRON SATN MFR SERPL: 20 %
IRON SERPL-MCNC: 43 UG/DL (ref 65–175)
LYMPHOCYTES # BLD AUTO: 1.54 THOUSAND/UL (ref 0.6–4.47)
LYMPHOCYTES # BLD AUTO: 20 % (ref 20–51)
MAGNESIUM SERPL-MCNC: 2.4 MG/DL (ref 1.9–2.7)
MCH RBC QN AUTO: 34.2 PG (ref 26–34)
MCHC RBC AUTO-ENTMCNC: 34.6 G/DL (ref 31–37)
MCV RBC AUTO: 99 FL (ref 81–99)
MONOCYTES # BLD AUTO: 1.23 THOUSAND/UL (ref 0–1.22)
MONOCYTES NFR BLD AUTO: 16 % (ref 4–12)
NEUTS SEG # BLD: 4.93 THOUSAND/UL (ref 1.81–6.82)
NEUTS SEG NFR BLD AUTO: 64 % (ref 42–75)
PHOSPHATE SERPL-MCNC: 6.1 MG/DL (ref 3–5.5)
PLATELET # BLD AUTO: 53 THOUSANDS/UL (ref 149–390)
PLATELET BLD QL SMEAR: ABNORMAL
PMV BLD AUTO: 11 FL (ref 8.6–11.7)
POTASSIUM SERPL-SCNC: 4 MMOL/L (ref 3.5–5.5)
PR INTERVAL: 126 MS
PROT SERPL-MCNC: 5.6 G/DL (ref 6.4–8.9)
QRS AXIS: 34 DEGREES
QRSD INTERVAL: 148 MS
QT INTERVAL: 414 MS
QTC INTERVAL: 520 MS
RBC # BLD AUTO: 3.11 MILLION/UL (ref 4.3–5.9)
RBC MORPH BLD: NORMAL
SODIUM 24H UR-SCNC: 43 MOL/L
SODIUM SERPL-SCNC: 132 MMOL/L (ref 134–143)
T WAVE AXIS: -28 DEGREES
TIBC SERPL-MCNC: 220 UG/DL (ref 250–450)
TOTAL CELLS COUNTED SPEC: 100
URATE SERPL-MCNC: 7.9 MG/DL (ref 2.3–7.6)
VENTRICULAR RATE: 95 BPM
WBC # BLD AUTO: 7.7 THOUSAND/UL (ref 4.8–10.8)

## 2019-09-25 PROCEDURE — 82570 ASSAY OF URINE CREATININE: CPT | Performed by: INTERNAL MEDICINE

## 2019-09-25 PROCEDURE — G8987 SELF CARE CURRENT STATUS: HCPCS

## 2019-09-25 PROCEDURE — 86160 COMPLEMENT ANTIGEN: CPT | Performed by: INTERNAL MEDICINE

## 2019-09-25 PROCEDURE — 93010 ELECTROCARDIOGRAM REPORT: CPT | Performed by: INTERNAL MEDICINE

## 2019-09-25 PROCEDURE — 84300 ASSAY OF URINE SODIUM: CPT | Performed by: INTERNAL MEDICINE

## 2019-09-25 PROCEDURE — 83935 ASSAY OF URINE OSMOLALITY: CPT | Performed by: INTERNAL MEDICINE

## 2019-09-25 PROCEDURE — 87205 SMEAR GRAM STAIN: CPT | Performed by: INTERNAL MEDICINE

## 2019-09-25 PROCEDURE — G8988 SELF CARE GOAL STATUS: HCPCS

## 2019-09-25 PROCEDURE — 83540 ASSAY OF IRON: CPT | Performed by: INTERNAL MEDICINE

## 2019-09-25 PROCEDURE — 84550 ASSAY OF BLOOD/URIC ACID: CPT | Performed by: INTERNAL MEDICINE

## 2019-09-25 PROCEDURE — 83550 IRON BINDING TEST: CPT | Performed by: INTERNAL MEDICINE

## 2019-09-25 PROCEDURE — G8978 MOBILITY CURRENT STATUS: HCPCS

## 2019-09-25 PROCEDURE — 99222 1ST HOSP IP/OBS MODERATE 55: CPT | Performed by: INTERNAL MEDICINE

## 2019-09-25 PROCEDURE — 85007 BL SMEAR W/DIFF WBC COUNT: CPT | Performed by: INTERNAL MEDICINE

## 2019-09-25 PROCEDURE — 83735 ASSAY OF MAGNESIUM: CPT | Performed by: INTERNAL MEDICINE

## 2019-09-25 PROCEDURE — 87086 URINE CULTURE/COLONY COUNT: CPT | Performed by: UROLOGY

## 2019-09-25 PROCEDURE — 99447 NTRPROF PH1/NTRNET/EHR 11-20: CPT | Performed by: PSYCHIATRY & NEUROLOGY

## 2019-09-25 PROCEDURE — 82043 UR ALBUMIN QUANTITATIVE: CPT | Performed by: INTERNAL MEDICINE

## 2019-09-25 PROCEDURE — 80053 COMPREHEN METABOLIC PANEL: CPT | Performed by: INTERNAL MEDICINE

## 2019-09-25 PROCEDURE — 85027 COMPLETE CBC AUTOMATED: CPT | Performed by: INTERNAL MEDICINE

## 2019-09-25 PROCEDURE — 97167 OT EVAL HIGH COMPLEX 60 MIN: CPT

## 2019-09-25 PROCEDURE — 97163 PT EVAL HIGH COMPLEX 45 MIN: CPT

## 2019-09-25 PROCEDURE — 82948 REAGENT STRIP/BLOOD GLUCOSE: CPT

## 2019-09-25 PROCEDURE — 99233 SBSQ HOSP IP/OBS HIGH 50: CPT | Performed by: NURSE PRACTITIONER

## 2019-09-25 PROCEDURE — 84100 ASSAY OF PHOSPHORUS: CPT | Performed by: INTERNAL MEDICINE

## 2019-09-25 PROCEDURE — 84156 ASSAY OF PROTEIN URINE: CPT | Performed by: INTERNAL MEDICINE

## 2019-09-25 PROCEDURE — 82232 ASSAY OF BETA-2 PROTEIN: CPT | Performed by: INTERNAL MEDICINE

## 2019-09-25 PROCEDURE — G8979 MOBILITY GOAL STATUS: HCPCS

## 2019-09-25 PROCEDURE — 86162 COMPLEMENT TOTAL (CH50): CPT | Performed by: INTERNAL MEDICINE

## 2019-09-25 RX ORDER — DIVALPROEX SODIUM 500 MG/1
500 TABLET, EXTENDED RELEASE ORAL 2 TIMES DAILY
Status: DISCONTINUED | OUTPATIENT
Start: 2019-09-25 | End: 2019-09-25

## 2019-09-25 RX ORDER — MAGNESIUM SULFATE HEPTAHYDRATE 40 MG/ML
2 INJECTION, SOLUTION INTRAVENOUS ONCE
Status: COMPLETED | OUTPATIENT
Start: 2019-09-25 | End: 2019-09-25

## 2019-09-25 RX ORDER — LEVOTHYROXINE SODIUM 0.1 MG/1
100 TABLET ORAL
Status: DISCONTINUED | OUTPATIENT
Start: 2019-09-25 | End: 2019-09-30 | Stop reason: HOSPADM

## 2019-09-25 RX ADMIN — LEVOTHYROXINE SODIUM 100 MCG: 100 TABLET ORAL at 05:30

## 2019-09-25 RX ADMIN — DOCUSATE SODIUM 100 MG: 100 CAPSULE, LIQUID FILLED ORAL at 17:21

## 2019-09-25 RX ADMIN — QUETIAPINE FUMARATE 100 MG: 50 TABLET, EXTENDED RELEASE ORAL at 22:35

## 2019-09-25 RX ADMIN — QUETIAPINE FUMARATE 150 MG: 50 TABLET, EXTENDED RELEASE ORAL at 17:21

## 2019-09-25 RX ADMIN — SODIUM CHLORIDE 100 ML/HR: 9 INJECTION, SOLUTION INTRAVENOUS at 16:39

## 2019-09-25 RX ADMIN — QUETIAPINE FUMARATE 300 MG: 150 TABLET, EXTENDED RELEASE ORAL at 22:36

## 2019-09-25 RX ADMIN — BENZTROPINE MESYLATE 0.5 MG: 1 TABLET ORAL at 17:21

## 2019-09-25 RX ADMIN — QUETIAPINE FUMARATE 50 MG: 50 TABLET, EXTENDED RELEASE ORAL at 17:21

## 2019-09-25 RX ADMIN — BISACODYL 10 MG: 10 SUPPOSITORY RECTAL at 12:22

## 2019-09-25 RX ADMIN — HYDROXYZINE HYDROCHLORIDE 25 MG: 25 TABLET ORAL at 17:22

## 2019-09-25 RX ADMIN — CEFTRIAXONE 1000 MG: 1 INJECTION, SOLUTION INTRAVENOUS at 17:20

## 2019-09-25 RX ADMIN — HYDROXYZINE HYDROCHLORIDE 25 MG: 25 TABLET ORAL at 22:00

## 2019-09-25 RX ADMIN — SODIUM CHLORIDE 100 ML/HR: 9 INJECTION, SOLUTION INTRAVENOUS at 06:37

## 2019-09-25 RX ADMIN — MAGNESIUM SULFATE HEPTAHYDRATE 2 G: 40 INJECTION, SOLUTION INTRAVENOUS at 03:01

## 2019-09-25 RX ADMIN — SODIUM CHLORIDE 200 MG: 9 INJECTION, SOLUTION INTRAVENOUS at 16:39

## 2019-09-25 NOTE — NURSING NOTE
Patient alert and orientated, in bed comfortably watching t v with family at bedside, denies any pain offers no complaints at this time

## 2019-09-25 NOTE — UTILIZATION REVIEW
Initial Clinical Review    Admission: Date/Time/Statement: Inpatient Admission Orders (From admission, onward)     Ordered        09/24/19 1205  Inpatient Admission (expected length of stay for this patient Order details is greater than two midnights)  Once                   Orders Placed This Encounter   Procedures    Inpatient Admission (expected length of stay for this patient Order details is greater than two midnights)     Standing Status:   Standing     Number of Occurrences:   1     Order Specific Question:   Admitting Physician     Answer:   Ken Heath     Order Specific Question:   Level of Care     Answer:   Med Surg [16]     Order Specific Question:   Bed request comments     Answer:   TELEMETRY     Order Specific Question:   Estimated length of stay     Answer:   More than 2 Midnights     Order Specific Question:   Certification     Answer:   I certify that inpatient services are medically necessary for this patient for a duration of greater than two midnights  See H&P and MD Progress Notes for additional information about the patient's course of treatment  ED Arrival Information     Expected Arrival Acuity Means of Arrival Escorted By Service Admission Type    - 9/24/2019 11:59 Urgent Ambulance [facility] Urgent    Arrival Complaint    lethargic        Chief Complaint   Patient presents with    Vomiting    Weakness - Generalized     states dosent feel well, unable to ambulate at home      Assessment/Plan: 48 y o  male with past medical history cerebral palsy, seizure disorder, and chronic psychiatric disorders, who presents with worsening mental status and fatigue  Majority of history was obtained from the patient's caregiver and sister who were at bedside  It appears over the weekend patient had been having gradual decline in his clinical status  His caregiver notes that he had decreased appetite, fatigue, and very little urine output    This prompted their evaluation in the emergency department  Patient appears to be indicating modest epigastric abdominal discomfort, however, otherwise unable to provide any history or any other localizing symptoms  She also notes that his last bowel movement was 3 days ago  Anticipated Length of Stay:  Patient will be admitted on an Inpatient basis with an anticipated length of stay of  > 2 midnights     Justification for Hospital Stay: [facility]       ED Triage Vitals   Temperature Pulse Respirations Blood Pressure SpO2   09/24/19 1202 09/24/19 1205 09/24/19 1205 09/24/19 1205 09/24/19 1205   97 6 °F (36 4 °C) 99 18 100/60 95 %      Temp Source Heart Rate Source Patient Position - Orthostatic VS BP Location FiO2 (%)   09/24/19 1202 09/24/19 1205 09/24/19 1205 09/24/19 1205 --   Temporal Monitor Sitting Left arm       Pain Score       09/24/19 1205       No Pain        Wt Readings from Last 1 Encounters:   09/25/19 83 2 kg (183 lb 6 8 oz)     Additional Vital Signs:   09/25/19 1540  98 4 °F (36 9 °C)  87  16  119/57  91 %  None (Room air)  Lying   09/25/19 0711  99 1 °F (37 3 °C)  94  18  95/50  91 %  None (Room air)  Lying   09/25/19 0006  97 1 °F (36 2 °C)Abnormal   104  16  119/62  95 %  None (Room air)  Lying   09/24/19 2230            None (Room air)     09/24/19 1546  97 °F (36 1 °C)Abnormal   102  16  102/60  93 %  None (Room air)  Lying   09/24/19 1205    99  18  100/60  95 %  None (Room air         Pertinent Labs/Diagnostic Test Results:   Results from last 7 days   Lab Units 09/25/19  0525 09/24/19  1302   WBC Thousand/uL 7 70 12 90*   HEMOGLOBIN g/dL 10 6* 11 1*   HEMATOCRIT % 30 7* 33 3*   PLATELETS Thousands/uL 53* 53*   TOTAL NEUT ABS Thousand/uL 4 93 7 74*   BANDS PCT %  --  3         Results from last 7 days   Lab Units 09/25/19  0525 09/24/19  1302   SODIUM mmol/L 132* 132*   POTASSIUM mmol/L 4 0 4 1   CHLORIDE mmol/L 98 95*   CO2 mmol/L 22 25   ANION GAP mmol/L 12 12   BUN mg/dL 81* 70*   CREATININE mg/dL 3 89* 3 91*   EGFR ml/min/1 73sq m 17 17   CALCIUM mg/dL 8 4* 8 3*   MAGNESIUM mg/dL 2 4 1 6*   PHOSPHORUS mg/dL 6 1*  --      Results from last 7 days   Lab Units 09/25/19  0525 09/24/19  1302   AST U/L 17 23   ALT U/L 9 10   ALK PHOS U/L 38* 45   TOTAL PROTEIN g/dL 5 6* 5 7*   ALBUMIN g/dL 2 8* 3 2*   TOTAL BILIRUBIN mg/dL 0 30 0 40     Results from last 7 days   Lab Units 09/25/19  1101 09/25/19  0631 09/24/19  2019 09/24/19  1550   POC GLUCOSE mg/dl 75 83 91 81     Results from last 7 days   Lab Units 09/25/19  0525 09/24/19  1302   GLUCOSE RANDOM mg/dL 80 101*     Results from last 7 days   Lab Units 09/24/19  1302   TROPONIN I ng/mL <0 03     Results from last 7 days   Lab Units 09/24/19  1302   PROTIME seconds 13 4*   INR  1 15   PTT seconds 30     Results from last 7 days   Lab Units 09/24/19  1302   LIPASE u/L 15     Results from last 7 days   Lab Units 09/25/19  0633 09/24/19  1324   CLARITY UA   --  Slightly Cloudy*   COLOR UA   --  Yellow   SPEC GRAV UA   --  >=1 030*   PH UA   --  5 0   GLUCOSE UA mg/dl  --  Negative   KETONES UA mg/dl  --  Negative   BLOOD UA   --  3+*   PROTEIN UA mg/dl  --  2+*   NITRITE UA   --  Negative   BILIRUBIN UA   --  Negative   UROBILINOGEN UA E U /dl  --  0 2   LEUKOCYTES UA   --  Trace*   WBC UA /hpf  --  4-10*   RBC UA /hpf  --  4-10*   BACTERIA UA /hpf  --  Occasional   EPITHELIAL CELLS WET PREP /hpf  --  Occasional   MUCUS THREADS   --  Occasional*   SODIUM UR  43  --    CREATININE UR mg/dL 237 0  --         EKG 09-24-19  Normal sinus rhythm  Right bundle branch block  Abnormal ECG  No previous ECGs available    CXR 09-24-19  IMPRESSION:  Diminished lung volumes  Bibasilar atelectasis  No lobar consolidation or large effusion  CT HEAD  09-24-19  No acute intracranial abnormality   Precocious volume loss disproportionate within the cerebellum  CT A/P  09-24-19    Bilateral renal calculi without hydronephrosis or hydroureter  There is no bladder dilatation  Perinephric stranding is seen bilaterally and is nonspecific though occasionally pyelonephritis can present this way  Extensive fecal stasis does raise the possibility of fecal impaction should be correlated with any recent bowel movement  No obvious bowel wall thickening or inflammatory changes elsewhere    US KIDNEY/BLADDER 09-25-19  No hydronephrosis   Multiple left renal calculi         ED Treatment:   Medication Administration from 09/24/2019 1159 to 09/24/2019 1538       Date/Time Order Dose Route Action Action by Comments     09/24/2019 1247 sodium chloride 0 9 % bolus 1,000 mL 1,000 mL Intravenous New Bag Melania Rogers RN         Past Medical History:   Diagnosis Date    ADRIANA [facility] kidney [facility]) ([facility] ) 9/24/2019    [facility]     Disease of thyroid gland     Hypertension     Osteoporosis     Psychiatric disorder     Scoliosis     Seizures ([facility] )      Present on Admission:   Generalized convulsive epilepsy ([facility] )   Type 2 diabetes mellitus without complication, without long-term current use of insulin ([facility] )   ADRIANA (acute kidney injury) ([facility] )   Other specified hypothyroidism   Thrombocytopenia (HCC)   Essential hypertension   Acute cystitis with hematuria   Toxic metabolic encephalopathy   Sepsis due to urinary tract infection (HCC)   Other constipation   Acute dehydration      Admitting Diagnosis: Vomiting [R11 10]  UTI (urinary tract infection) [N39 0]  Weakness generalized [R53 1]  Acute renal failure (ARF) (HCC) [N17 9]  Valproic acid toxicity [T42 6X1A]  Age/Sex: 48 y o  male  Admission Orders:    Current Facility-Administered Medications:  acetaminophen 650 mg Oral Q6H PRN   benztropine 0 5 mg Oral BID   bisacodyl 10 mg Rectal Daily   cefTRIAXone 1,000 mg Intravenous Q24H   docusate sodium 100 mg Oral BID   hydrOXYzine HCL 25 mg Oral TID   insulin lispro 1-5 Units Subcutaneous HS   insulin lispro 1-6 Units Subcutaneous TID AC   [START ON 9/26/2019] lacosamide (VIMPAT) IVPB 150 mg Intravenous Q12H   lacosamide (VIMPAT) IVPB 200 mg Intravenous Once   levothyroxine 100 mcg Oral Early Morning   ondansetron 4 mg Intravenous Once   QUEtiapine 300 mg Oral HS   And      QUEtiapine 100 mg Oral HS   QUEtiapine 150 mg Oral BID   And      QUEtiapine 50 mg Oral BID   senna 2 tablet Oral Daily PRN   sodium chloride 100 mL/hr Intravenous Continuous       IP CONSULT TO NEPHROLOGY  IP CONSULT TO UROLOGY  IP CONSULT TO NEUROLOGY   EEG 09-25-19  PT/OT  SCD      Network Utilization Review Department  Phone: [phone]; Fax [phone]  [email or web address]  ATTENTION: Please call with any questions or concerns to [phone]  and carefully listen to the prompts so that you are directed to the right person  Send all requests for admission clinical reviews, approved or denied determinations and any other requests to fax [phone]   All voicemails are confidential

## 2019-09-25 NOTE — PLAN OF CARE
Problem: Prexisting or High Potential for Compromised Skin Integrity  Goal: Skin integrity is maintained or improved  Description  INTERVENTIONS:  - Identify patients at risk for skin breakdown  - Assess and monitor skin integrity  - Assess and monitor nutrition and hydration status  - Monitor labs   - Assess for incontinence   - Turn and reposition patient  - Assist with mobility/ambulation  - Relieve pressure over bony prominences  - Avoid friction and shearing  - Provide appropriate hygiene as needed including keeping skin clean and dry  - Evaluate need for skin moisturizer/barrier cream  - Collaborate with interdisciplinary team   - Patient/family teaching  - Consider wound care consult   Outcome: Progressing     Problem: Potential for Falls  Goal: Patient will remain free of falls  Description  INTERVENTIONS:  - Assess patient frequently for physical needs  -  Identify cognitive and physical deficits and behaviors that affect risk of falls    -  [place] fall precautions as indicated by assessment   - Educate patient/family on patient safety including physical limitations  - Instruct patient to call for assistance with activity based on assessment  - Modify environment to reduce risk of injury  - Consider OT/PT consult to assist with strengthening/mobility  Outcome: Progressing     Problem: PAIN - ADULT  Goal: Verbalizes/displays adequate comfort level or baseline comfort level  Description  Interventions:  - Encourage patient to monitor pain and request assistance  - Assess pain using appropriate pain scale  - Administer analgesics based on type and severity of pain and evaluate response  - Implement non-pharmacological measures as appropriate and evaluate response  - Consider cultural and social influences on pain and pain management  - Notify physician/advanced practitioner if interventions unsuccessful or patient reports new pain  Outcome: Progressing     Problem: INFECTION - ADULT  Goal: Absence or prevention of progression during hospitalization  Description  INTERVENTIONS:  - Assess and monitor for signs and symptoms of infection  - Monitor lab/diagnostic results  - Monitor all insertion sites, i e  indwelling lines, tubes, and drains  - Monitor endotracheal if appropriate and nasal secretions for changes in amount and color  - [place] appropriate cooling/warming therapies per order  - Administer medications as ordered  - Instruct and encourage patient and family to use good hand hygiene technique  - Identify and instruct in appropriate isolation precautions for identified infection/condition  Outcome: Progressing     Problem: SAFETY ADULT  Goal: Maintain or return to baseline ADL function  Description  INTERVENTIONS:  -  Assess patient's ability to carry out ADLs; assess patient's baseline for ADL function and identify physical deficits which impact ability to perform ADLs (bathing, care of mouth/teeth, toileting, grooming, dressing, etc )  - Assess/evaluate cause of self-care deficits   - Assess range of motion  - Assess patient's mobility; develop plan if impaired  - Assess patient's need for assistive devices and provide as appropriate  - Encourage maximum independence but intervene and supervise when necessary  - Involve family in performance of ADLs  - Assess for home care needs following discharge   - Consider OT consult to assist with ADL evaluation and planning for discharge  - Provide patient education as appropriate  Outcome: Progressing  Goal: Maintain or return mobility status to optimal level  Description  INTERVENTIONS:  - Assess patient's baseline mobility status (ambulation, transfers, stairs, etc )    - Identify cognitive and physical deficits and behaviors that affect mobility  - Identify mobility aids required to assist with transfers and/or ambulation (gait belt, sit-to-stand, lift, walker, cane, etc )  - [place] fall precautions as indicated by assessment  - Record patient progress and toleration of activity level on Mobility SBAR; progress patient to next Phase/Stage  - Instruct patient to call for assistance with activity based on assessment  - Consider rehabilitation consult to assist with strengthening/weightbearing, etc   Outcome: Progressing     Problem: DISCHARGE PLANNING  Goal: Discharge to home or other facility with appropriate resources  Description  INTERVENTIONS:  - Identify barriers to discharge w/patient and caregiver  - Arrange for needed discharge resources and transportation as appropriate  - Identify discharge learning needs (meds, wound care, etc )  - Arrange for interpretive services to assist at discharge as needed  - Refer to Case Management Department for coordinating discharge planning if the patient needs post-hospital services based on physician/advanced practitioner order or complex needs related to functional status, cognitive ability, or social support system  Outcome: Progressing     Problem: Knowledge Deficit  Goal: Patient/family/caregiver demonstrates understanding of disease process, treatment plan, medications, and discharge instructions  Description  Complete learning assessment and assess knowledge base    Interventions:  - Provide teaching at level of understanding  - Provide teaching via preferred learning methods  Outcome: Progressing     Problem: NEUROSENSORY - ADULT  Goal: Remains free of injury related to seizures activity  Description  INTERVENTIONS  - Maintain airway, patient safety  and administer oxygen as ordered  - Monitor patient for seizure activity, document and report duration and description of seizure to physician/advanced practitioner  - If seizure occurs,  ensure patient safety during seizure  - Reorient patient post seizure  - Seizure pads on all 4 side rails  - Instruct patient/family to notify RN of any seizure activity including if an aura is experienced  - Instruct patient/family to call for assistance with activity based on nursing assessment  - Administer anti-seizure medications if ordered    Outcome: Progressing     Problem: GASTROINTESTINAL - ADULT  Goal: Minimal or absence of nausea and/or vomiting  Description  INTERVENTIONS:  - Administer IV fluids if ordered to ensure adequate hydration  - Maintain NPO status until nausea and vomiting are resolved  - Nasogastric tube if ordered  - Administer ordered antiemetic medications as needed  - Provide nonpharmacologic comfort measures as appropriate  - Advance diet as tolerated, if ordered  - Consider nutrition services referral to assist patient with adequate nutrition and appropriate food choices  Outcome: Progressing  Goal: Maintains or returns to baseline bowel function  Description  INTERVENTIONS:  - Assess bowel function  - Encourage oral fluids to ensure adequate hydration  - Administer IV fluids if ordered to ensure adequate hydration  - Administer ordered medications as needed  - Encourage mobilization and activity  - Consider nutritional services referral to assist patient with adequate nutrition and appropriate food choices  Outcome: Progressing  Goal: Maintains adequate nutritional intake  Description  INTERVENTIONS:  - Monitor percentage of each meal consumed  - Identify factors contributing to decreased intake, treat as appropriate  - Assist with meals as needed  - Monitor I&O, weight, and lab values if indicated  - Obtain nutrition services referral as needed  Outcome: Progressing     Problem: GENITOURINARY - ADULT  Goal: Maintains or returns to baseline urinary function  Description  INTERVENTIONS:  - Assess urinary function  - Encourage oral fluids to ensure adequate hydration if ordered  - Administer IV fluids as ordered to ensure adequate hydration  - Administer ordered medications as needed  - Offer frequent toileting  - Follow urinary retention protocol if ordered  Outcome: Progressing  Goal: Absence of urinary retention  Description  INTERVENTIONS:  - Assess patients ability to void and empty bladder  - Monitor I/O  - Bladder scan as needed  - Discuss with physician/AP medications to alleviate retention as needed  - Discuss catheterization for long term situations as appropriate  Outcome: Progressing     Problem: METABOLIC, FLUID AND ELECTROLYTES - ADULT  Goal: Glucose maintained within target range  Description  INTERVENTIONS:  - Monitor Blood Glucose as ordered  - Assess for signs and symptoms of hyperglycemia and hypoglycemia  - Administer ordered medications to maintain glucose within target range  - Assess nutritional intake and initiate nutrition service referral as needed  Outcome: Progressing     Problem: SKIN/TISSUE INTEGRITY - ADULT  Goal: Skin integrity remains intact  Description  INTERVENTIONS  - Identify patients at risk for skin breakdown  - Assess and monitor skin integrity  - Assess and monitor nutrition and hydration status  - Monitor labs (i e  albumin)  - Assess for incontinence   - Turn and reposition patient  - Assist with mobility/ambulation  - Relieve pressure over bony prominences  - Avoid friction and shearing  - Provide appropriate hygiene as needed including keeping skin clean and dry  - Evaluate need for skin moisturizer/barrier cream  - Collaborate with interdisciplinary team (i e  Nutrition, Rehabilitation, etc )   - Patient/family teaching  Outcome: Progressing     Problem: MUSCULOSKELETAL - ADULT  Goal: Maintain or return mobility to safest level of function  Description  INTERVENTIONS:  - Assess patient's ability to carry out ADLs; assess patient's baseline for ADL function and identify physical deficits which impact ability to perform ADLs (bathing, care of mouth/teeth, toileting, grooming, dressing, etc )  - Assess/evaluate cause of self-care deficits   - Assess range of motion  - Assess patient's mobility  - Assess patient's need for assistive devices and provide as appropriate  - Encourage maximum independence but intervene and supervise when necessary  - Involve family in performance of ADLs  - Assess for home care needs following discharge   - Consider OT consult to assist with ADL evaluation and planning for discharge  - Provide patient education as appropriate  Outcome: Progressing     Problem: DISCHARGE PLANNING - CARE MANAGEMENT  Goal: Discharge to post-acute care or home with appropriate resources  Description  INTERVENTIONS:  - Conduct assessment to determine patient/family and health care team treatment goals, and need for post-acute services based on payer coverage, community resources, and patient preferences, and barriers to discharge  - Address psychosocial, clinical, and financial barriers to discharge as identified in assessment in conjunction with the patient/family and health care team  - Arrange appropriate level of post-acute services according to patient's   needs and preference and payer coverage in collaboration with the physician and health care team  - Communicate with and update the patient/family, physician, and health care team regarding progress on the discharge plan  - Arrange appropriate transportation to post-acute venues  Pt will go home with [facility] who will provide transportation     Outcome: Progressing

## 2019-09-25 NOTE — PROGRESS NOTES
Progress Note Christiano Culp 1965, 48 y o  male MRN: [id number]    Unit/Bed#: -01 Encounter: [id number]    Primary Care Provider: HUNTER Roland   Date and time admitted to hospital: 9/24/2019 12:01 PM        * Sepsis due to urinary tract infection [facility])  Assessment & Plan  · As evidence by elevated white blood cell count of 12 90, hyponatremia, tachycardia, acute kidney injury  · Patient also with kidney stones  · Continue IV Rocephin  · Continue IV fluids    ADRIANA (acute kidney injury) ([facility] [address] )  Assessment & Plan  · Unclear etiology possibly due to dehydration  · Check urine studies  · Check renal ultrasound - no hydronephrosis, positive left renal calculi  · Follow-up CT abdomen pelvis - bilateral renal stones question pyelonephritis, extensive fecal material   · Consult Nephrology  · IV fluids    Acute cystitis with hematuria  Assessment & Plan  · Present on admission  · Ceftriaxone pending culture results    Toxic metabolic encephalopathy  Assessment & Plan  · Likely due to above-mentioned medical problems  · If no improvement in mental status over the next 24 hours, will consider Neurology evaluation    Acute dehydration  Assessment & Plan  · Unsure is due to UTI or Kidney stones  · Will continue with IVF  · Monitor labs  Other constipation  Assessment & Plan  · Per CT abdomen and pelvis: Extensive fecal stasis does raise the possibility of fecal impaction should be correlated with any recent bowel movement    · Will continue with IVF    Generalized convulsive [facility] ([facility] )  Assessment & Plan  · Valproic acid level noted to be elevated, will hold for now  · We will hold Zonegran as well in light of acute kidney injury  · If no improvement in renal function, will consider Neurology consultation for medication adjustments    Thrombocytopenia ([facility] [address] )  Assessment & Plan  · Appears to be chronic issue for the patient and is followed as an outpatient  · Will hold heparin for now  · Monitor blood counts    Type 2 diabetes mellitus without complication, without long-term current use of insulin [facility])  Assessment & Plan  Lab Results   Component Value Date    HGBA1C 5 8 2019       Recent Labs     19  1550 19  0631 19  1101   POCGLU 81 91 83 75       Blood Sugar Average: Last 72 hrs:  (P) 82 5   · Hold metformin  · Sliding scale insulin    Essential hypertension  Assessment & Plan  · Hold lisinopril in light of acute kidney injury    Other specified hypothyroidism  Assessment & Plan  · Continue Synthroid  · Last TSH from 6 weeks ago was within normal limits      VTE Pharmacologic Prophylaxis: Pharmacologic: SCDs only as patient does have thrombocytopenia    Patient Centered Rounds: I have performed bedside rounds with nursing staff today  Discussions with Specialists or Other Care Team Provider:  Nursing, Nephrology, Urology  Education and Discussions with Family / Patient:  Discussed with patient and caretaker at bedside    Current Length of Stay: 1 day(s)    Current Patient Status: Inpatient   Certification Statement: The patient will continue to require additional inpatient hospital stay due to Continued need for IV fluids, IV antibiotics, monitoring of kidney function  Discharge Plan:  Back to home when appropriate    Code Status: Level 1 - Full Code    Subjective:   Patient is lying in bed  Nursing staff had reported that the patient was rather lethargic  As a spoke to the patient he did turn his head to and asked me What do you want?"     Objective:     Vitals:   Temp (24hrs), Av 7 °F (36 5 °C), Min:97 °F (36 1 °C), Max:99 1 °F (37 3 °C)    Temp:  [97 °F (36 1 °C)-99 1 °F (37 3 °C)] 99 1 °F (37 3 °C)  HR:  [] 94  Resp:  [16-18] 18  BP: ()/(50-62) 95/50  SpO2:  [91 %-95 %] 91 %  Body mass index is 25 18 kg/m²  Input and Output Summary (last 24 hours):        Intake/Output Summary (Last 24 hours) at 2019 1317  Last data filed at 2019 1158  Gross per 24 hour   Intake 340 ml   Output 600 ml   Net -260 ml       Physical Exam:     Physical Exam   Constitutional: Vital signs are normal  He appears well-developed  He appears lethargic  He is cooperative  He appears ill  HENT:   Head: Normocephalic and atraumatic  Nose: Nose normal    Mouth/Throat: Oropharynx is clear and moist and mucous membranes are normal    Eyes: Conjunctivae and EOM are normal    Neck: Normal range of motion and full passive range of motion without pain  Cardiovascular: Regular rhythm, normal heart sounds and normal pulses  Tachycardia present  Pulmonary/Chest: Effort normal and breath sounds normal    Abdominal: Soft  Normal appearance and bowel sounds are normal    Genitourinary:   Genitourinary Comments: Bladder scan shows 250 mL  Patient had been previously straight cath, will continue to monitor  Musculoskeletal:   Generalized weakness   Neurological: He appears lethargic  Skin: Skin is warm and dry  Psychiatric:   Unable to assess secondary to patient's condition  Additional Data:     Labs:    Results from last 7 days   Lab Units 09/25/19  0525   WBC Thousand/uL 7 70   HEMOGLOBIN g/dL 10 6*   HEMATOCRIT % 30 7*   PLATELETS Thousands/uL 53*   LYMPHO PCT % 20   MONO PCT % 16*     Results from last 7 days   Lab Units 09/25/19  0525   POTASSIUM mmol/L 4 0   CHLORIDE mmol/L 98   CO2 mmol/L 22   BUN mg/dL 81*   CREATININE mg/dL 3 89*   CALCIUM mg/dL 8 4*   ALK PHOS U/L 38*   ALT U/L 9   AST U/L 17     Results from last 7 days   Lab Units 09/24/19  1302   INR  1 15     Results from last 7 days   Lab Units 09/25/19  1101 09/25/19  0631 09/24/19  2019 09/24/19  1550   POC GLUCOSE mg/dl 75 83 91 81           * I Have Reviewed All Lab Data Listed Above  * Additional Pertinent Lab Tests Reviewed:  Daisy 68 Admission  Reviewed    Imaging:  Imaging Reports Reviewed Today Include:  None    Recent Cultures (last 7 days):           Last 24 Hours Medication List:     Current Facility-Administered Medications:  acetaminophen 650 mg Oral Q6H PRN Rocky Lino MD    benztropine 0 5 mg Oral BID Rocky Lino MD    bisacodyl 10 mg Rectal Daily Rocky Lino MD    cefTRIAXone 1,000 mg Intravenous Q24H Rocky Lino MD    docusate sodium 100 mg Oral BID Rocky Lino MD    gabapentin 100 mg Oral TID Rocky Lino MD    hydrOXYzine HCL 25 mg Oral TID Rocky Lino MD    insulin lispro 1-5 Units Subcutaneous HS Rocky Lino MD    insulin lispro 1-6 Units Subcutaneous TID [facility] Rocky Lino MD    levothyroxine 100 mcg Oral Early Morning Gayle Maciel PA-C    ondansetron 4 mg Intravenous Once Rocky Lino MD    QUEtiapine 300 mg Oral HS Huy Fields MD    And        QUEtiapine 100 mg Oral HS Huy Fields MD    QUEtiapine 150 mg Oral BID Huy Fields MD    And        QUEtiapine 50 mg Oral BID Huy Fields MD    senna 2 tablet Oral Daily PRN Rocky Lino MD    sodium chloride 100 mL/hr Intravenous Continuous Rocky Lino MD Last Rate: 100 mL/hr (09/25/19 5834)        Today, Patient Was Seen By: HUNTER Evans    ** Please Note: Dictation voice to text software may have been used in the creation of this document   **

## 2019-09-25 NOTE — ASSESSMENT & PLAN NOTE
· Per CT abdomen and pelvis: Extensive fecal stasis does raise the possibility of fecal impaction should be correlated with any recent bowel movement    · Will continue with IVF

## 2019-09-25 NOTE — NURSING NOTE
Pt unable to void overnight  Pt drowsy  Denies pain or discomfort at this time  Abdomen distended  Pt bladder scanned and did have 408 ml retention  Pt straight cathed as per protocol and got out 600 ml of elsa color urine  Hospitalist Lavell Hutson made aware  Pt bed low, call bell in reach   Will continue to monitor and reassess

## 2019-09-25 NOTE — TELEMEDICINE
Neurology    Spoke to [facility] primary team regarding patient  Patient currently with altered mentation and likely toxic metabolic encephelopathy with ADRIANA, and UTI with nephrolithiasis, and thrombocytopenia  Neurology was consulted regarding seizure medications  He has not had a clinical seizure this hospitalization per primary team     Recommend discontinue Zonegran given kidney stones, Depakote with worsening thrombocytopenia, and discontinue gabapentin given ADRIANA can add to encephalopathy  Will load him on Vimpat- renally dose  300 IV x 1 now then 100 BID starting drummond AM   Routine EEG to make sure no focal seizures- spoke with Dr Jain   CT H no contrast not acute    Consider reducing Seroquel dose given encephelopathy in the setting of renal dysfunction    Will call in for video-consultation tomorrow if no significant clinical improvement in the setting of improving metabolic derangement or if any new focal neuro deficits or seizure like activity call neurology on call immediately    Discussed with [facility] and Dr Sharee ALFORD attending  Total time spent with audio provider to provider telecommunication, chart review and formulation of plan at least 20 minutes

## 2019-09-25 NOTE — SOCIAL WORK
Evaluated the pt at the bedside with the CG present answering all questions  Pt was admitted to the hospital with ADRIANA and vomiting  CG states the pt was very lethargic and not voiding at home  Explained the role of CM and provided options with the pt and CG  CG Armando Roy states the pt is life sharing in her home "[facility]" and has been with her for 10 years  CG indicated the pt has an intellectual disability and cannot answer any questions for CM  Pt is very lethargic at this time  CG states the pt lives in a 2 story home with 2 DHARMESH and 10 steps up to his bedroom  CG states they walk behind him up the steps  Pt needs assistance with bathing, can put his pants on, however needs help with upper body clothing  CG performs all IADL's including giving medications and driving to all appointments  Pt walks without any assistive device  CG indicated the pt can let his needs be made known  Pt PCP is Sandeep HARRISON  Pt pharmacy is [facility]  CG states she will be able to transport the pt home upon discharge  Patient/caregiver received discharge checklist   Content reviewed  Patient/caregiver encouraged to participate in discharge plan of care prior to discharge home  CM reviewed d/c planning process including the following: identifying help at home, patient preference for d/c planning needs, availability of treatment team to discuss questions or concerns patient and/or family may have regarding understanding medications and recognizing signs and symptoms once discharged  CM also encouraged patient to follow up with all recommended appointments after discharge  Patient advised of importance for patient and family to participate in managing patients medical well being

## 2019-09-25 NOTE — CONSULTS
Consultation - Nephrology   Diane Simmonds Schreck 48 y o  male MRN: [id number]  Unit/Bed#: -01 Encounter: [id number]      A/P:  1  Acute kidney injury: from a previously normal creatinine  He had a creatinine of 0 62 July 31, 2019 and has risen to 3 89  He has no evidence of obstructive uropathy  He may have acute tubular necrosis due to UTI and volume depletion and is receiving IVF  However, he has hematuria and proteinuria  Will check complements to consider a post infectious glomerulonephritis  He is receiving empirics antibiotics (ceftriaxone) and cultures are pending  His renal ultrasound demonstrates normal sized kidneys without obstruction  He does have perinephric stranding on CT  He was on metformin and lisinopril as an outpatient as well as ibuprofen, all potential renal toxins  They have been held  Will check urine eosinophils and beta 2 microglobulin of the urine  2  Renal calculi: (bilateral  and nonobstructively) may contribute to hematuria  3  Hypertension: Controlled  4  DM-2 without long term use of insulin: currently receiving insulin  A1c was well controlled at 5 8%  5  Anemia and thrombocytopenia: had thrombocytopenia on prior labs  Will check an SPEP and % iron sat  6  Hyponatremia: He is on Depakote which can cause SIADH, however, he had a serum sodium of 140 July 31, 2019  Suggests hyponatremic hypovolemia         Thank you for allowing us to participate in the care of your patient  Please feel free to contact us regarding the care of this patient, or any other questions/concerns that may be applicable      Patient Active Problem List   Diagnosis    Cataract    [facility]bral [facility]    Type 2 diabetes mellitus without complication, without long-term current use of insulin ([facility] )    Generalized convulsive epilepsy ([facility] )    Hyperlipidemia    Hypertension    Hypothyroidism    Osteoporosis    Scoliosis    Vitamin B12 deficiency    Vitamin D deficiency    Screening for colon cancer    Seborrheic dermatitis of scalp    Headache    Nearsightedness    Behavior concern in adult    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HCC)    Thrombocytopenia (HCC)    ADRIANA (acute kidney injury) ([facility] )    UTI (urinary tract infection)    Toxic metabolic encephalopathy       History of Present Illness   Physician Requesting Consult: Tanya Caraballo MD  Reason for Consult / Principal Problem: acute kidney injury  Hx and PE limited by:   HPI: Jaylan Benton is a 48y o  year old male who presents with altered mental status and lethargy  To Dr Deja Corley by his family member he  Apparently had worsening mental status and fatigued with decreased appetite and low lower urine output  He also complained of lower abdominal tenderness which was not present today  He was admitted with a diagnosis of UTI, volume depletion and metabolic encephalopathy  He had decreased urine output and had a markedly concentrated urine  He was noted to have hematuria, proteinuria as well as renal calculi in his left kidney  He previously had normal kidney function July 31st with a creatinine of 0 62 July 31, 2019    History obtained from chart review and unobtainable from patient due to mental status    Review of Systems - Negative except as mentioned above in HPI, more specifics as mentioned below  Review of Systems - Negative except he is lethargic and opens eyes but cannot provide a ROS    Historical Information   Past Medical History:   Diagnosis Date    CP (cerebral palsy) ([facility] )     Disease of thyroid gland     Hypertension     Osteoporosis     Psychiatric disorder     Scoliosis     Seizures ([facility] )      History reviewed  No pertinent surgical history    Social History   Social History     Substance and Sexual Activity   Alcohol Use Never    Frequency: Never     Social History     Substance and Sexual Activity   Drug Use No     Social History     Tobacco Use   Smoking Status Never Smoker   Smokeless Tobacco Never Used     History reviewed  No pertinent family history      Meds/Allergies   all current active meds have been reviewed, current meds:   Current Facility-Administered Medications   Medication Dose Route Frequency    acetaminophen (TYLENOL) tablet 650 mg  650 mg Oral Q6H PRN    benztropine (COGENTIN) tablet 0 5 mg  0 5 mg Oral BID    bisacodyl (DULCOLAX) rectal suppository 10 mg  10 mg Rectal Daily    cefTRIAXone (ROCEPHIN) IVPB (premix) 1,000 mg  1,000 mg Intravenous Q24H    docusate sodium (COLACE) capsule 100 mg  100 mg Oral BID    gabapentin (NEURONTIN) capsule 100 mg  100 mg Oral TID    hydrOXYzine HCL (ATARAX) tablet 25 mg  25 mg Oral TID    insulin lispro (HumaLOG) 100 units/mL subcutaneous injection 1-5 Units  1-5 Units Subcutaneous HS    insulin lispro (HumaLOG) 100 units/mL subcutaneous injection 1-6 Units  1-6 Units Subcutaneous TID AC    levothyroxine tablet 100 mcg  100 mcg Oral Early Morning    ondansetron (ZOFRAN) injection 4 mg  4 mg Intravenous Once    QUEtiapine (SEROquel XR) 24 hr tablet 300 mg  300 mg Oral HS    And    QUEtiapine (SEROquel XR) 24 hr tablet 100 mg  100 mg Oral HS    QUEtiapine (SEROquel XR) 24 hr tablet 150 mg  150 mg Oral BID    And    QUEtiapine (SEROquel XR) 24 hr tablet 50 mg  50 mg Oral BID    senna (SENOKOT) tablet 17 2 mg  2 tablet Oral Daily PRN    sodium chloride 0 9 % infusion  100 mL/hr Intravenous Continuous    and PTA meds:    Medications Prior to Admission   Medication    benztropine (COGENTIN) 0 5 mg tablet    divalproex sodium (DEPAKOTE ER) 500 mg 24 hr tablet    docusate sodium (COLACE) 100 mg capsule    gabapentin (NEURONTIN) 100 mg capsule    hydrOXYzine pamoate (VISTARIL) 25 mg capsule    ketoconazole (NIZORAL) 2 % shampoo    levothyroxine 125 mcg tablet    lisinopril (ZESTRIL) 10 mg tablet    metFORMIN (GLUCOPHAGE) 1000 MG tablet    multivitamin (THERAGRAN) TABS    QUEtiapine (SEROquel XR) 200 mg 24 hr tablet    QUEtiapine (SEROquel XR) 400 mg 24 hr tablet    simvastatin (ZOCOR) 40 mg tablet    zonisamide (ZONEGRAN) 100 mg capsule    calcium carbonate (OYSTER SHELL,OSCAL) 500 mg    cholecalciferol (VITAMIN D3) 1,000 units tablet    cyanocobalamin (VITAMIN B-12) 1,000 mcg tablet    ibuprofen (MOTRIN) 600 mg tablet    loratadine (ALLERGY) 10 mg tablet         Allergies   Allergen Reactions    Erythromycin     Penicillins        Objective     Intake/Output Summary (Last 24 hours) at 9/25/2019 0834  Last data filed at 9/25/2019 0600  Gross per 24 hour   Intake 240 ml   Output 600 ml   Net -360 ml       Invasive Devices:        Physical Exam      I/O last 3 completed shifts: In: 240 [P O :240]  Out: 600 [Urine:600]    Vitals:    09/25/19 0711   BP: 95/50   Pulse: 94   Resp: 18   Temp: 99 1 °F (37 3 °C)   SpO2: 91%       Gen: opens eyes on prodding but nonresponsive to verbal questions  HEENT: no sclerous icterus, MMM, neck supple  CV: +S1/S2, RRR  Lungs: CTA bilaterally  Abd: +BS, soft NT/ND - lower abdominal tenderness not elicited on this exam  Ext: all four extremities are warm  Skin: no rashes noted  Neuro: CN II-XII cannot be assessed due to mental status    Current Weight: Weight - Scale: 79 6 kg (175 lb 7 8 oz)(Weigh bed)  First Weight: Weight - Scale: 80 kg (176 lb 5 9 oz)    Lab Results:  I have personally reviewed pertinent labs      CBC:   Lab Results   Component Value Date    WBC 7 70 09/25/2019    HGB 10 6 (L) 09/25/2019    HCT 30 7 (L) 09/25/2019    MCV 99 09/25/2019    PLT 53 (L) 09/25/2019    MCH 34 2 (H) 09/25/2019    MCHC 34 6 09/25/2019    RDW 14 7 (H) 09/25/2019    MPV 11 0 09/25/2019     CMP:   Lab Results   Component Value Date    K 4 0 09/25/2019    CL 98 09/25/2019    CO2 22 09/25/2019    BUN 81 (H) 09/25/2019    CREATININE 3 89 (H) 09/25/2019    CALCIUM 8 4 (L) 09/25/2019    AST 17 09/25/2019    ALT 9 09/25/2019    ALKPHOS 38 (L) 09/25/2019    EGFR 17 09/25/2019     Phosphorus: No results found for: PHOS  Magnesium:   Lab Results   Component Value Date    MG 2 4 09/25/2019     Urinalysis:   Lab Results   Component Value Date    COLORU Yellow 09/24/2019    CLARITYU Slightly Cloudy (A) 09/24/2019    SPECGRAV >=1 030 (H) 09/24/2019    PHUR 5 0 09/24/2019    LEUKOCYTESUR Trace (A) 09/24/2019    NITRITE Negative 09/24/2019    GLUCOSEU Negative 09/24/2019    KETONESU Negative 09/24/2019    BILIRUBINUR Negative 09/24/2019    BLOODU 3+ (A) 09/24/2019     Ionized Calcium: No results found for: CAION  Coagulation:   Lab Results   Component Value Date    INR 1 15 09/24/2019     Troponin:   Lab Results   Component Value Date    TROPONINI <0 03 09/24/2019     ABG: No results found for: PHART, OIS0NEC, PO2ART, PWO7BZI, C8PTFOSC, BEART, SOURCE    Results from last 7 days   Lab Units 09/25/19  0525 09/24/19  1302   POTASSIUM mmol/L 4 0 4 1   CHLORIDE mmol/L 98 95*   CO2 mmol/L 22 25   BUN mg/dL 81* 70*   CREATININE mg/dL 3 89* 3 91*   CALCIUM mg/dL 8 4* 8 3*   ALK PHOS U/L 38* 45   ALT U/L 9 10   AST U/L 17 23       Radiology review:  Procedure: Ct Abdomen Pelvis Wo Contrast    Result Date: 9/24/2019  Narrative: CT ABDOMEN AND PELVIS WITHOUT IV CONTRAST INDICATION:   renal failure  Unable to urinate COMPARISON:  Study from 4/15/2009 TECHNIQUE:  CT examination of the abdomen and pelvis was performed without intravenous contrast   Axial, sagittal, and coronal 2D reformatted images were created from the source data and submitted for interpretation  Radiation dose length product (DLP) for this visit:  570 5 mGy-cm   This examination, like all CT scans performed in the [facility], was performed utilizing techniques to minimize radiation dose exposure, including the use of iterative reconstruction and automated exposure control  Enteric contrast was not administered  FINDINGS: ABDOMEN LOWER CHEST:  Patchy areas of airspace opacities probably related to dependent change   Small infiltrate may be present at the right lung base  LIVER/BILIARY TREE:  Unremarkable  GALLBLADDER:  No calcified gallstones  No pericholecystic inflammatory change  SPLEEN:  Unremarkable  PANCREAS:  Unremarkable  ADRENAL GLANDS:  Unremarkable  KIDNEYS/URETERS:  Nonobstructing calculi are present within the right kidney  The left kidney demonstrates slightly larger nonobstructing calculi including 6 mm in the upper pole left kidney  Smaller calculi are seen in the lower pole  There is no hydronephrosis present or hydroureter  Perinephric stranding is present bilaterally which is symmetric but new since the prior study  STOMACH AND BOWEL:  There is significant fecal stasis seen throughout the course: With distention of the rectum raising the possibility of developing fecal impaction  These findings are new since the prior study  APPENDIX:  The appendix is not well seen  Surgical clips are seen near the cecum  ABDOMINOPELVIC CAVITY:  No ascites or free intraperitoneal air  No lymphadenopathy  VESSELS:  Unremarkable for patient's age  PELVIS REPRODUCTIVE ORGANS:  Unremarkable for patient's age  URINARY BLADDER:  Unremarkable  The prostate is not enlarged and measures 4 cm transversely  ABDOMINAL WALL/INGUINAL REGIONS:  Unremarkable  OSSEOUS STRUCTURES:  Well-corticated ossific enlargement of the left iliac crest probably related to prior trauma unchanged since the prior study  Serpiginous sclerotic densities of the femoral heads is probably related to avascular necrosis and similar to the prior study  Impression: Bilateral renal calculi without hydronephrosis or hydroureter  There is no bladder dilatation  Perinephric stranding is seen bilaterally and is nonspecific though occasionally pyelonephritis can present this way  Extensive fecal stasis does raise the possibility of fecal impaction should be correlated with any recent bowel movement  No obvious bowel wall thickening or inflammatory changes elsewhere  Immediate finding was noted in the Epic system  Workstation performed: [id number]     Procedure: Xr Chest 1 View Portable    Result Date: 9/24/2019  Narrative: CHEST INDICATION:   AMS  COMPARISON:  April 24, 2009 EXAM PERFORMED/VIEWS:  XR CHEST PORTABLE AP semierect chest x-ray FINDINGS:  Diminished lung volumes  Elevation right hemidiaphragm  Atelectatic changes both lung bases  Cardiac size within normal limits  No vascular congestion or peribronchial thickening  No pneumothorax or free air  Osseous structures appear within normal limits for patient age  Impression: Diminished lung volumes  Bibasilar atelectasis  No lobar consolidation or large effusion  Workstation performed: [id number]     Procedure: Ct Head Without Contrast    Result Date: 9/24/2019  Narrative: CT BRAIN - WITHOUT CONTRAST INDICATION:   Altered mental status  COMPARISON:  None  TECHNIQUE:  CT examination of the brain was performed  In addition to axial images, coronal 2D reformatted images were created and submitted for interpretation  Radiation dose length product (DLP) for this visit:  852 1 mGy-cm   This examination, like all CT scans performed in the [facility], was performed utilizing techniques to minimize radiation dose exposure, including the use of iterative reconstruction and automated exposure control  IMAGE QUALITY:  Diagnostic  FINDINGS: PARENCHYMA: Decreased attenuation is noted in periventricular and subcortical white matter demonstrating an appearance that is statistically most likely to represent mild microangiopathic change  Disproportionate cerebellar atrophy is identified  No CT signs of acute infarction  No intracranial mass, mass effect or midline shift  No acute parenchymal hemorrhage  VENTRICLES AND EXTRA-AXIAL SPACES:  Normal for the patient's age  VISUALIZED ORBITS AND PARANASAL SINUSES:  Unremarkable  CALVARIUM AND EXTRACRANIAL SOFT TISSUES:  Normal      Impression: No acute intracranial abnormality    Precocious volume loss disproportionate within the cerebellum  Workstation performed: [id number]     Procedure: Us Kidney And Bladder    Result Date: 9/25/2019  Narrative: RENAL ULTRASOUND INDICATION:   RENAL FAILURE, CHRONIC   Velna Kearns COMPARISON: CT scan 9/24/2019  TECHNIQUE:   Ultrasound of the retroperitoneum was performed with a curvilinear transducer utilizing volumetric sweeps and still imaging techniques  FINDINGS: KIDNEYS: Symmetric and normal size  Right kidney:  14 6 cm  Left kidney:  14 6 cm  Right kidney Normal echogenicity and contour  No suspicious masses detected  No hydronephrosis  Tiny calculus seen on the CT scan is not appreciated on this study  No perinephric fluid collections  Left kidney Normal echogenicity and contour  No suspicious masses detected  No hydronephrosis  Multiple calculi scattered throughout the kidney, largest at the upper pole measuring 9 mm  No perinephric fluid collections  URETERS: Nonvisualized  BLADDER: Normally distended  No focal thickening or mass lesions  Bilateral ureteral jets detected  Impression: No hydronephrosis  Multiple left renal calculi  Workstation performed: [id number]         EKG, Pathology, and Other Studies: reviewed ultrasound and labs      Counseling / Coordination of Care  Total ADDITIONAL floor / unit time spent today 30 minutes  Greater than 50% of total time was spent with the patient and / or family counseling and / or coordination of care   A description of the counseling / coordination of care: follows    Guero Saleh MD

## 2019-09-25 NOTE — PHYSICAL THERAPY NOTE
Physical Therapy Evaluation     Patient's Name: Andrew Daily    Admitting Diagnosis  Vomiting [R11 10]  UTI (urinary tract infection) [N39 0]  Weakness generalized [R53 1]  Acute renal failure (ARF) ([facility] ) [N17 9]  Valproic acid toxicity [T42 6X1A]    Problem List  Patient Active Problem List   Diagnosis    Cataract    Cerebral palsy ([facility] )    Type 2 diabetes mellitus without complication, without long-term current use of insulin ([place] [address] )    Generalized convulsive epilepsy ([place] [address] )    Hyperlipidemia    Hypertension    Hypothyroidism    Osteoporosis    Scoliosis    Vitamin B12 deficiency    Vitamin D deficiency    Screening for colon cancer    Seborrheic dermatitis of scalp    Headache    Nearsightedness    Behavior concern in adult    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HCC)    Thrombocytopenia ([facility] )    ADRIANA (acute kidney injury) ([place] [address] )    UTI (urinary tract infection)    Toxic metabolic encephalopathy       Past Medical History  Past Medical History:   Diagnosis Date    CP (cerebral palsy) ([place] [address] )     Disease of thyroid gland     Hypertension     Osteoporosis     Psychiatric disorder     Scoliosis     Seizures ([place] [address] )        Past Surgical History  History reviewed  No pertinent surgical history         09/25/19 0910   Note Type   Note type Eval only   Pain Assessment   Pain Assessment FLACC   Pain Rating: FLACC (Rest) - Face 0   Pain Rating: FLACC (Rest) - Legs 0   Pain Rating: FLACC (Rest) - Activity 0   Pain Rating: FLACC (Rest) - Cry 0   Pain Rating: FLACC (Rest) - Consolability 0   Score: FLACC (Rest) 0   Pain Rating: FLACC (Activity) - Face 1   Pain Rating: FLACC (Activity) - Legs 0   Pain Rating: FLACC (Activity) - Activity 1   Pain Rating: FLACC (Activity) - Cry 0   Pain Rating: FLACC (Activity) - Consolability 0   Score: FLACC (Activity) 2   Home Living   Type of Home House  ("family living")   Home Layout Two level;Stairs to enter with rails  (2 DHARMESH, 10 steps to bedroom)   [place] Shower/Tub Tub/shower unit   Bathroom Toilet Standard   Bathroom Accessibility Accessible   Additional Comments Prior living environment was obtained via CM notes, as pt 's lethargy prohibited further information  Prior Function   Level of [place] Needs assistance with IADLs; Needs assistance with ADLs and functional mobility   Lives With Facility staff   Receives Help From Personal care attendant   ADL Assistance Needs assistance   IADLs Needs assistance   Falls in the last 6 months   (unknown)   Restrictions/Precautions   Weight Bearing Precautions Per Order No   Other Precautions Cognitive; Fall Risk   General   Family/Caregiver Present No   Cognition   Overall Cognitive Status Unable to assess   Arousal/Participation Lethargic  (notable)   Orientation Level Unable to assess   Memory Unable to assess   Following Commands Unable to follow one step commands   RUE Assessment   RUE Assessment   (please see OT assessment)   LUE Assessment   LUE Assessment   (please see OT assessment)   RLE Assessment   RLE Assessment X   RLE Overall PROM   R Hip Flexion   (strength could not be tested 2/2 pt 's inability to particip)   R Hip Extension [facility]   R Hip ABduction WFL   R Knee Extension WFL   R Ankle Dorsiflexion WFL   LLE Assessment   LLE Assessment X  (strength could not be tested 2/2 pt 's inability to particip)   LLE Overall PROM   L Hip Flexion WFL   L Hip Extension WFL   L Hip ABduction WFL   L Knee Extension WFL   L Ankle Dorsiflexion WFL   Coordination   Movements are Fluid and Coordinated 0   Coordination and Movement Description Incremental mobility w/ noted jerkiness upon PROM   Bed Mobility   Rolling R 2  Maximal assistance   Additional items Assist x 2; Increased time required;Verbal cues;LE management   Rolling L 2  Maximal assistance   Additional items Assist x 2;Bedrails;Verbal cues;LE management   Supine to Sit Unable to assess  (2/2 safety concerns w/decreased level of alertness)   Transfers   Sit to Stand Unable to assess   Ambulation/Elevation   Gait pattern Not appropriate; Not tested   Endurance Deficit   Endurance Deficit Yes   Activity Tolerance   Activity Tolerance Patient limited by fatigue;Treatment limited secondary to medical complications (Comment)  (notable lethargy)   Nurse Made Aware yes, Fela How RN   Assessment   Prognosis Guarded   Problem List Decreased strength;Decreased endurance; Impaired balance;Decreased mobility; Decreased cognition; Impaired judgement   Assessment Pt is 48 y o  male seen for PT evaluation s/p admit to [facility] on 9/24/2019 w/ ADRIANA (acute kidney injury) ([facility] )  PT consulted to assess pt's functional mobility and d/c needs  Order placed for PT eval and tx, w/ activity as tolerated order  Comorbidities affecting pt's physical performance at time of assessment include: weakness, ADRIANA, lethargy, DM, convulsive epilepsy, HTN, hypothyroidism, thrombocytopenia, UTI, toxic metabolic encephalopathy, CP, cerebral paresis w/hoomolateral ataxia  PTA, pt was dependent for all mobility tasks  Personal factors affecting pt at time of IE include: communication issues, decreased initiation and engagement and unable to perform physical activity  Please find objective findings from PT assessment regarding body systems outlined above with impairments and limitations including weakness, impaired balance, decreased endurance, decreased activity tolerance, decreased functional mobility tolerance, decreased safety awareness, impaired judgement and fall risk  The following objective measures performed on IE also reveal limitations: Barthel Index: 15/100  Pt's clinical presentation is currently unstable/unpredictable  Pt to benefit from continued PT tx to address deficits as defined above and maximize level of functional independent mobility and consistency  From PT/mobility standpoint, recommendation at time of d/c would be deferred at this time     Goals   Patient Goals unknown at this time as pt  could not provide 2/2 lethargy   LTG Expiration Date 10/05/19   Long Term Goal #1 Pt will(dependent on patient's ability to safely participate in interventions) perform bed mobility tasks to min A of 1 to decrease burden of care  Perform transfers to mod A of 2 to decrease risk of skin breakdown with change of position  Perform ambulation with RW for 15-50 feet, mod A of 2 and RW to improve activity tolerance & endurance  Patient will tolerate AAROM<-> PROM BLE's to decrease risk of contractures and facilitate joint proprioception  Patient will demonstrate increased static sitting balance to Fair-,  Tactile cues 100% of treatment session to facilitate activation of stabilizing muscles and prepare for safe transfers  PT Treatment Day 0   Plan   Treatment/Interventions Functional transfer training;LE strengthening/ROM; Therapeutic exercise; Endurance training;Bed mobility;Gait training;Continued evaluation;Spoke to nursing;OT   PT Frequency Other (Comment)  (3-5x/wk)   Recommendation   Recommendation Defer at this time   PT - OK to Discharge No   Additional Comments Upon conclusion, pt  was resting in bed w/ nursing staff informed of assessment outcome     Barthel Index   Feeding 0   Bathing 0   Grooming Score 0   Dressing Score 5   Bladder Score 5   Bowels Score 5   Toilet Use Score 0   Transfers (Bed/Chair) Score 0   Mobility (Level Surface) Score 0   Stairs Score 0   Barthel Index Score 15     Manuela Ovalle, PT

## 2019-09-25 NOTE — PLAN OF CARE
Problem: DISCHARGE PLANNING - CARE MANAGEMENT  Goal: Discharge to post-acute care or home with appropriate resources  Description  INTERVENTIONS:  - Conduct assessment to determine patient/family and health care team treatment goals, and need for post-acute services based on payer coverage, community resources, and patient preferences, and barriers to discharge  - Address psychosocial, clinical, and financial barriers to discharge as identified in assessment in conjunction with the patient/family and health care team  - Arrange appropriate level of post-acute services according to patient's   needs and preference and payer coverage in collaboration with the physician and health care team  - Communicate with and update the patient/family, physician, and health care team regarding progress on the discharge plan  - Arrange appropriate transportation to post-acute venues  Pt will go home with life share CG who will provide transportation     Outcome: Progressing

## 2019-09-25 NOTE — ASSESSMENT & PLAN NOTE
Lab Results   Component Value Date    HGBA1C 5 8 08/28/2019       Recent Labs     09/24/19  1550 09/24/19 2019 09/25/19  0631 09/25/19  1101   POCGLU 81 91 83 75       Blood Sugar Average: Last 72 hrs:  (P) 82 5   · Hold metformin  · Sliding scale insulin

## 2019-09-25 NOTE — PLAN OF CARE
Problem: OCCUPATIONAL THERAPY ADULT  Goal: Performs self-care activities at highest level of function for planned discharge setting  See evaluation for individualized goals  Description  Treatment Interventions: ADL retraining, Functional transfer training, UE strengthening/ROM, Endurance training, Cognitive reorientation, Patient/family training, Equipment evaluation/education, Neuromuscular reeducation, Compensatory technique education, Continued evaluation, Activityengagement          See flowsheet documentation for full assessment, interventions and recommendations  Note:   Limitation: Decreased ADL status, Decreased UE ROM, Decreased UE strength, Decreased cognition, Decreased endurance, Decreased self-care trans, Decreased high-level ADLs  Prognosis: Fair  Assessment: Pt is a 48 y o  male who was admitted to [address] [place] [address] on 9/24/2019 with ADRIANA (acute kidney injury) ([facility] [address] )  At this time, patient is also affected by the comorbidities of: DM, generalized convulsive epilepsy, HTN, hypothyroidism, thrombocytopenia, UTI and toxic metabolic encephalopathy  Additionally, patient is affected by the following personal factors:steps to enter environment, difficulty performing ADLS, difficulty performing IADLS  and decreased initiation and engagement   Orders placed for OT evaluation/treatment with up with assistance orders  Prior to admission, Per chart review, patient requires assistance with ADLs and lives at family house  Upon OT evaluation, patient requires maximum assist and total assist for UB ADLs and total assist for LB ADLs  Occupational performance is affected by the following deficits: decreased ROM, decreased strength, decreased balance, decreased tolerance, impaired arousal, impaired attention, impaired initiation, impaired memory, impaired sequencing and impaired problem solving   Based on the following information, patient would benefit from continued skilled OT treatment while in the hospital to address deficits and maximize level of functional independence with ADL's and functional mobility  Occupational Performance areas to address include: eating, grooming, bathing/shower, toilet hygiene, dressing, functional mobility and clothing management  From OT standpoint, recommendation at time of d/c would be defer at this time due to lethargic behavior       OT Discharge Recommendation: Other (Comment)(Defer at this time )  OT - OK to Discharge: (Once medically cleared)     Kiki Irizarry, OT

## 2019-09-25 NOTE — NURSING NOTE
Patient remains lethargic this am unable to safety adm p o meds, caretaker stated he did wake up and ate some applesauce  Patient has not voided since he was straight cath by previous shift at 6 am, bladder scanned at 276, will re- eval in 4 hrs as per protocol

## 2019-09-25 NOTE — ASSESSMENT & PLAN NOTE
· As evidence by elevated white blood cell count of 12 90, hyponatremia, tachycardia, acute kidney injury  · Patient also with kidney stones  · Continue IV Rocephin    · Continue IV fluids

## 2019-09-25 NOTE — CONSULTS
Consultation - Urology   Odessa Dickerson 48 y o  male MRN: [id number]  Unit/Bed#: -01 Encounter: [id number]      Assessment/Plan      Assessment:  Nonobstructing left kidney stones  Acute renal failure  Urinary retention which has developed since admission  Plan:  Continue Carrillo catheter  Follow urine output and renal function  No immediate intervention is necessary for the nonobstructing kidney stones  Elective abdominal x-ray will be obtained in the future  Recheck urinalysis and culture from the Carrillo  History of Present Illness   Attending: Kyler Noe MD  Reason for Consult / Principal Problem:  Kidney stone  HPI: Odessa Dickerson is a 48y o  year old male who presents with worsening mental status, fatigue, decreased appetite and decreased urine output over the past several days  He has a history of cerebral palsy with seizure disorder and chronic psychiatric disorders  His baseline creatinine was normal, but admission creatinine was 3 91   CT scan showed several nonobstructing left kidney stones  There was no hydronephrosis bilaterally  Bladder scan late last night was about 130 mL  However, he has not been voiding according to the nurses  Bladder scan currently shows 384 mL  I placed a 16 [place] Carrillo catheter with almost 500 mL of clear yellow urine drained  Initial voided urinalysis showed microscopic hematuria, but was not suspicious for infection  Consults    Review of Systems   Unable to perform ROS: Mental status change       Historical Information   Past Medical History:   Diagnosis Date    ADRIANA (acute kidney injury) ([facility] [address] ) 9/24/2019    [facility]     Disease of thyroid gland     Hypertension     Osteoporosis     Psychiatric disorder     Scoliosis     Seizures ([facility] )      History reviewed  No pertinent surgical history    Social History   Social History     Substance and Sexual Activity   Alcohol Use Never    Frequency: Never     Social History Substance and Sexual Activity   Drug Use No     Social History     Tobacco Use   Smoking Status Never Smoker   Smokeless Tobacco Never Used     Family History: non-contributory    Meds/Allergies   current meds:   Current Facility-Administered Medications   Medication Dose Route Frequency    acetaminophen (TYLENOL) tablet 650 mg  650 mg Oral Q6H PRN    benztropine (COGENTIN) tablet 0 5 mg  0 5 mg Oral BID    bisacodyl (DULCOLAX) rectal suppository 10 mg  10 mg Rectal Daily    cefTRIAXone (ROCEPHIN) IVPB (premix) 1,000 mg  1,000 mg Intravenous Q24H    docusate sodium (COLACE) capsule 100 mg  100 mg Oral BID    hydrOXYzine HCL (ATARAX) tablet 25 mg  25 mg Oral TID    insulin lispro (HumaLOG) 100 units/mL subcutaneous injection 1-5 Units  1-5 Units Subcutaneous HS    insulin lispro (HumaLOG) 100 units/mL subcutaneous injection 1-6 Units  1-6 Units Subcutaneous TID AC    [START ON 9/26/2019] lacosamide (VIMPAT) 150 mg in sodium chloride 0 9 % 100 mL IVPB  150 mg Intravenous Q12H    levothyroxine tablet 100 mcg  100 mcg Oral Early Morning    ondansetron (ZOFRAN) injection 4 mg  4 mg Intravenous Once    QUEtiapine (SEROquel XR) 24 hr tablet 300 mg  300 mg Oral HS    And    QUEtiapine (SEROquel XR) 24 hr tablet 100 mg  100 mg Oral HS    QUEtiapine (SEROquel XR) 24 hr tablet 150 mg  150 mg Oral BID    And    QUEtiapine (SEROquel XR) 24 hr tablet 50 mg  50 mg Oral BID    senna (SENOKOT) tablet 17 2 mg  2 tablet Oral Daily PRN    sodium chloride 0 9 % infusion  100 mL/hr Intravenous Continuous     Allergies   Allergen Reactions    Erythromycin     Penicillins        Objective   Vitals: Blood pressure 119/57, pulse 87, temperature 98 4 °F (36 9 °C), temperature source Temporal, resp  rate 16, height 5' 10" (1 778 m), weight 83 2 kg (183 lb 6 8 oz), SpO2 91 %  I/O last 24 hours:   In: 0 [P O :340]  Out: 600 [Urine:600]    Invasive Devices     Peripheral Intravenous Line            Peripheral IV 09/24/19 Right Antecubital 1 day                Physical Exam   Abdominal: Soft  He exhibits no distension  There is no tenderness  Genitourinary: Penis normal     Normal testicles and spermatic cords bilaterally  Lab Results:   CBC:   Lab Results   Component Value Date    WBC 7 70 09/25/2019    HGB 10 6 (L) 09/25/2019    HCT 30 7 (L) 09/25/2019    MCV 99 09/25/2019    PLT 53 (L) 09/25/2019    MCH 34 2 (H) 09/25/2019    MCHC 34 6 09/25/2019    RDW 14 7 (H) 09/25/2019    MPV 11 0 09/25/2019     CMP:   Lab Results   Component Value Date    SODIUM 132 (L) 09/25/2019    CL 98 09/25/2019    CO2 22 09/25/2019    BUN 81 (H) 09/25/2019    CREATININE 3 89 (H) 09/25/2019    CALCIUM 8 4 (L) 09/25/2019    AST 17 09/25/2019    ALT 9 09/25/2019    ALKPHOS 38 (L) 09/25/2019    EGFR 17 09/25/2019     Urinalysis: No results found for: Sky Thakur, SPECGRAV, PHUR, LEUKOCYTESUR, NITRITE, PROTEINUA, GLUCOSEU, KETONESU, BILIRUBINUR, BLOODU  Urine Culture: No results found for: URINECX  Imaging Studies: I have personally reviewed pertinent reports  EKG, Pathology, and Other Studies: I have personally reviewed pertinent reports  VTE Prophylaxis: Sequential compression device (Venodyne)     Code Status: Level 1 - Full Code  Advance Directive and Living Will:      Power of :    POLST:      Counseling / Coordination of Care  Total floor / unit time spent today 30 minutes  Greater than 50% of total time was spent with the patient and / or family counseling and / or coordination of care

## 2019-09-25 NOTE — OCCUPATIONAL THERAPY NOTE
Occupational Therapy Evaluation      Mahamed Gravesjason    9/25/2019    Patient Active Problem List   Diagnosis    Cataract    [facility]bral palsy ([facility] )    Type 2 diabetes mellitus without complication, without long-term current use of insulin ([place] [address] )    Generalized convulsive epilepsy ([facility] )    Hyperlipidemia    Hypertension    Hypothyroidism    Osteoporosis    Scoliosis    Vitamin B12 deficiency    Vitamin D deficiency    Screening for colon cancer    Seborrheic dermatitis of scalp    Headache    Nearsightedness    Behavior concern in adult    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HCC)    Thrombocytopenia (HCC)    ADRIANA (acute kidney injury) ([facility] )    UTI (urinary tract infection)    Toxic metabolic encephalopathy       Past Medical History:   Diagnosis Date    CP (cerebral palsy) (HCC)     Disease of thyroid gland     Hypertension     Osteoporosis     Psychiatric disorder     Scoliosis     Seizures ([place] [address] )         09/25/19 0820   Note Type   Note type Eval only   Restrictions/Precautions   Weight Bearing Precautions Per Order No   Other Precautions Cognitive; Fall Risk   Pain Assessment   Pain Assessment FLACC   Pain Rating: FLACC (Rest) - Face 0   Pain Rating: FLACC (Rest) - Legs 0   Pain Rating: FLACC (Rest) - Activity 0   Pain Rating: FLACC (Rest) - Cry 0   Pain Rating: FLACC (Rest) - Consolability 0   Score: FLACC (Rest) 0   Pain Rating: FLACC (Activity) - Face 1   Pain Rating: FLACC (Activity) - Legs 0   Pain Rating: FLACC (Activity) - Activity 1   Pain Rating: FLACC (Activity) - Cry 0   Pain Rating: FLACC (Activity) - Consolability 0   Score: FLACC (Activity) 2   Home Living   Type of Home House  ("family living")   Home Layout Two level;Stairs to enter with rails  (2 [address], 10 steps to bedroom)   Bathroom Shower/Tub Tub/shower unit   Bathroom Toilet Standard   Bathroom Accessibility Accessible   Additional Comments Prior living environment was obtained via CM notes, as pt 's lethargy prohibited further information  Prior Function   Level of [place] Needs assistance with IADLs; Needs assistance with ADLs and functional mobility   Lives With Facility staff   Receives Help From Personal care attendant   ADL Assistance Needs assistance   IADLs Needs assistance   Falls in the last 6 months   (unknown)   Lifestyle   Autonomy Per chart review, patient requires assistance with ADLs and lives at family house   Reciprocal Relationships facility staff    ADL   Eating Assistance 3  Moderate Assistance   Grooming Assistance 3  Moderate Assistance   UB Bathing Assistance 2  Maximal Assistance   LB [address] 1  Total Assistance   [address] 1  Total Assistance   LB Dressing Assistance 1  Total [address]  1  Total Assistance   Bed Mobility   Rolling R 2  Maximal assistance   Additional items Assist x 2; Increased time required;Verbal cues;LE management   Rolling L 2  Maximal assistance   Additional items Assist x 2;Bedrails;Verbal cues;LE management   Functional Mobility   Additional Comments Further functional mobility was deferred at this time due to safety concerns and decreased alertness   Activity Tolerance   Activity Tolerance Patient limited by fatigue;Treatment limited secondary to medical complications (Comment)  (notable lethargy)   Nurse Made Aware DOUGLAS Gonzalez made of aware of outcomes/recs   RUE Assessment   RUE Assessment X  (full PROM, limited AROM due to notable lethargy)   LUE Assessment   LUE Assessment X  (full PROM, limited AROM due to notable lethargy)   Hand Function   Gross Motor Coordination Functional   Fine Motor Coordination Functional   Cognition   Overall Cognitive Status Unable to assess   Orientation Level Unable to assess   Memory Unable to assess   Following Commands Unable to follow one step commands   Assessment   Limitation Decreased ADL status; Decreased UE ROM; Decreased UE strength;Decreased cognition;Decreased endurance;Decreased self-care trans;Decreased high-level ADLs   Prognosis Fair   Assessment Pt is a 48 y o  male who was admitted to [address] [place] [address] on 9/24/2019 with ADRIANA (acute kidney injury) ([facility] [address] )  At this time, patient is also affected by the comorbidities of: DM, generalized convulsive epilepsy, HTN, hypothyroidism, thrombocytopenia, UTI and toxic metabolic encephalopathy  Additionally, patient is affected by the following personal factors:steps to enter environment, difficulty performing ADLS, difficulty performing IADLS  and decreased initiation and engagement   Orders placed for OT evaluation/treatment with up with assistance orders  Prior to admission, Per chart review, patient requires assistance with ADLs and lives at family house  Upon OT evaluation, patient requires maximum assist and total assist for UB ADLs and total assist for LB ADLs  Occupational performance is affected by the following deficits: decreased ROM, decreased strength, decreased balance, decreased tolerance, impaired arousal, impaired attention, impaired initiation, impaired memory, impaired sequencing and impaired problem solving  Based on the following information, patient would benefit from continued skilled OT treatment while in the hospital to address deficits and maximize level of functional independence with ADL's and functional mobility  Occupational Performance areas to address include: eating, grooming, bathing/shower, toilet hygiene, dressing, functional mobility and clothing management  From OT standpoint, recommendation at time of d/c would be defer at this time due to lethargic behavior  Goals   Patient Goals unknown at this time as pt  could not provide 2/2 lethargy   Plan   Treatment Interventions ADL retraining;Functional transfer training;UE strengthening/ROM; Endurance training;Cognitive reorientation;Patient/family training;Equipment evaluation/education; Neuromuscular reeducation; Compensatory technique education;Continued evaluation; Activityengagement   Goal Expiration Date 10/05/19   OT Treatment Day 0   OT Frequency 3-5x/wk   Recommendation   OT Discharge Recommendation Other (Comment)  (Defer at this time )   OT - OK to Discharge   (Once medically cleared)   Barthel Index   Feeding 0   Bathing 0   Grooming Score 0   Dressing Score 5   Bladder Score 5   Bowels Score 5   Toilet Use Score 0   Transfers (Bed/Chair) Score 0   Mobility (Level Surface) Score 0   Stairs Score 0   Barthel Index Score 15     GOALS:    Pt will achieve the following within specified time frame: STG  Pt will achieve the following goals within 5 days    *ADL transfers with Min (A) for inc'd independence with ADLs/purposeful tasks    *UB ADL with Min (A) for inc'd independence with self cares    *LB ADL with Min (A) using AE prn for inc'd independence with self cares    *Toileting with Min (A) for clothing management and hygiene for return to PLOF with personal care    *Increase static stand balance to fair and dyn stand balance to fair for inc'd safety with standing purposeful tasks    *Increase stand tolerance x5 m for inc'd tolerance with standing purposeful tasks    *Participate in 10m UE therex to increase overall stamina/activity tolerance for purposeful tasks    *Bed mobility- Min (A) for inc'd independence to manage own comfort and initiate EOB & OOB purposeful tasks    *Patient will verbalize 3 safety awareness/ principles to prevent falls in the home setting  *Patient will verbalize and demonstrate use of energy conservation/deep breathing techniques and work simplification skills during functional activities with no verbal cues         Pt will achieve the following within specified time frame: LTG  Pt will achieve the following goals within 10 days    *ADL transfers with (S) for inc'd independence with ADLs/purposeful tasks    *Self Feeding- (S) for inc'd independence with providing self nourishment    *UB ADL with (S) for inc'd independence with self cares    *LB ADL with (S) using AE prn for inc'd independence with self cares    *Toileting with (S) for clothing management and hygiene for return to PLOF with personal care    *Increase static stand balance to good and dyn stand balance to fair+ for inc'd safety with standing purposeful tasks    *Increase stand tolerance x8 m for inc'd tolerance with standing purposeful tasks    *Bed mobility- (S) for inc'd independence to manage own comfort and initiate EOB & OOB purposeful tasks    *Patient will increase OOB/sitting tolerance to 2-4 hours per day to increase participation in self-care and leisure tasks with no s/s of exertion           Anitha Alarcon MS, OTR/L

## 2019-09-25 NOTE — PLAN OF CARE
Problem: Prexisting or High Potential for Compromised Skin Integrity  Goal: Skin integrity is maintained or improved  Description  INTERVENTIONS:  - Identify patients at risk for skin breakdown  - Assess and monitor skin integrity  - Assess and monitor nutrition and hydration status  - Monitor labs   - Assess for incontinence   - Turn and reposition patient  - Assist with mobility/ambulation  - Relieve pressure over bony prominences  - Avoid friction and shearing  - Provide appropriate hygiene as needed including keeping skin clean and dry  - Evaluate need for skin moisturizer/barrier cream  - Collaborate with interdisciplinary team   - Patient/family teaching  - Consider wound care consult   Outcome: Progressing     Problem: Potential for Falls  Goal: Patient will remain free of falls  Description  INTERVENTIONS:  - Assess patient frequently for physical needs  -  Identify cognitive and physical deficits and behaviors that affect risk of falls    -  [place] fall precautions as indicated by assessment   - Educate patient/family on patient safety including physical limitations  - Instruct patient to call for assistance with activity based on assessment  - Modify environment to reduce risk of injury  - Consider OT/PT consult to assist with strengthening/mobility  Outcome: Progressing     Problem: PAIN - ADULT  Goal: Verbalizes/displays adequate comfort level or baseline comfort level  Description  Interventions:  - Encourage patient to monitor pain and request assistance  - Assess pain using appropriate pain scale  - Administer analgesics based on type and severity of pain and evaluate response  - Implement non-pharmacological measures as appropriate and evaluate response  - Consider cultural and social influences on pain and pain management  - Notify physician/advanced practitioner if interventions unsuccessful or patient reports new pain  Outcome: Progressing     Problem: INFECTION - ADULT  Goal: Absence or prevention of progression during hospitalization  Description  INTERVENTIONS:  - Assess and monitor for signs and symptoms of infection  - Monitor lab/diagnostic results  - Monitor all insertion sites, i e  indwelling lines, tubes, and drains  - Monitor endotracheal if appropriate and nasal secretions for changes in amount and color  - [place] appropriate cooling/warming therapies per order  - Administer medications as ordered  - Instruct and encourage patient and family to use good hand hygiene technique  - Identify and instruct in appropriate isolation precautions for identified infection/condition  Outcome: Progressing     Problem: SAFETY ADULT  Goal: Maintain or return to baseline ADL function  Description  INTERVENTIONS:  -  Assess patient's ability to carry out ADLs; assess patient's baseline for ADL function and identify physical deficits which impact ability to perform ADLs (bathing, care of mouth/teeth, toileting, grooming, dressing, etc )  - Assess/evaluate cause of self-care deficits   - Assess range of motion  - Assess patient's mobility; develop plan if impaired  - Assess patient's need for assistive devices and provide as appropriate  - Encourage maximum independence but intervene and supervise when necessary  - Involve family in performance of ADLs  - Assess for home care needs following discharge   - Consider OT consult to assist with ADL evaluation and planning for discharge  - Provide patient education as appropriate  Outcome: Progressing  Goal: Maintain or return mobility status to optimal level  Description  INTERVENTIONS:  - Assess patient's baseline mobility status (ambulation, transfers, stairs, etc )    - Identify cognitive and physical deficits and behaviors that affect mobility  - Identify mobility aids required to assist with transfers and/or ambulation (gait belt, sit-to-stand, lift, walker, cane, etc )  - [place] fall precautions as indicated by assessment  - Record patient progress and toleration of activity level on Mobility SBAR; progress patient to next Phase/Stage  - Instruct patient to call for assistance with activity based on assessment  - Consider rehabilitation consult to assist with strengthening/weightbearing, etc   Outcome: Progressing     Problem: DISCHARGE PLANNING  Goal: Discharge to home or other facility with appropriate resources  Description  INTERVENTIONS:  - Identify barriers to discharge w/patient and caregiver  - Arrange for needed discharge resources and transportation as appropriate  - Identify discharge learning needs (meds, wound care, etc )  - Arrange for interpretive services to assist at discharge as needed  - Refer to Case Management Department for coordinating discharge planning if the patient needs post-hospital services based on physician/advanced practitioner order or complex needs related to functional status, cognitive ability, or social support system  Outcome: Progressing     Problem: Knowledge Deficit  Goal: Patient/family/caregiver demonstrates understanding of disease process, treatment plan, medications, and discharge instructions  Description  Complete learning assessment and assess knowledge base    Interventions:  - Provide teaching at level of understanding  - Provide teaching via preferred learning methods  Outcome: Progressing     Problem: NEUROSENSORY - ADULT  Goal: Remains free of injury related to seizures activity  Description  INTERVENTIONS  - Maintain airway, patient safety  and administer oxygen as ordered  - Monitor patient for seizure activity, document and report duration and description of seizure to physician/advanced practitioner  - If seizure occurs,  ensure patient safety during seizure  - Reorient patient post seizure  - Seizure pads on all 4 side rails  - Instruct patient/family to notify RN of any seizure activity including if an aura is experienced  - Instruct patient/family to call for assistance with activity based on nursing assessment  - Administer anti-seizure medications if ordered    Outcome: Progressing     Problem: GASTROINTESTINAL - ADULT  Goal: Minimal or absence of nausea and/or vomiting  Description  INTERVENTIONS:  - Administer IV fluids if ordered to ensure adequate hydration  - Maintain NPO status until nausea and vomiting are resolved  - Nasogastric tube if ordered  - Administer ordered antiemetic medications as needed  - Provide nonpharmacologic comfort measures as appropriate  - Advance diet as tolerated, if ordered  - Consider nutrition services referral to assist patient with adequate nutrition and appropriate food choices  Outcome: Progressing  Goal: Maintains or returns to baseline bowel function  Description  INTERVENTIONS:  - Assess bowel function  - Encourage oral fluids to ensure adequate hydration  - Administer IV fluids if ordered to ensure adequate hydration  - Administer ordered medications as needed  - Encourage mobilization and activity  - Consider nutritional services referral to assist patient with adequate nutrition and appropriate food choices  Outcome: Progressing  Goal: Maintains adequate nutritional intake  Description  INTERVENTIONS:  - Monitor percentage of each meal consumed  - Identify factors contributing to decreased intake, treat as appropriate  - Assist with meals as needed  - Monitor I&O, weight, and lab values if indicated  - Obtain nutrition services referral as needed  Outcome: Progressing     Problem: GENITOURINARY - ADULT  Goal: Maintains or returns to baseline urinary function  Description  INTERVENTIONS:  - Assess urinary function  - Encourage oral fluids to ensure adequate hydration if ordered  - Administer IV fluids as ordered to ensure adequate hydration  - Administer ordered medications as needed  - Offer frequent toileting  - Follow urinary retention protocol if ordered  Outcome: Progressing  Goal: Absence of urinary retention  Description  INTERVENTIONS:  - Assess patients ability to void and empty bladder  - Monitor I/O  - Bladder scan as needed  - Discuss with physician/AP medications to alleviate retention as needed  - Discuss catheterization for long term situations as appropriate  Outcome: Progressing     Problem: METABOLIC, FLUID AND ELECTROLYTES - ADULT  Goal: Glucose maintained within target range  Description  INTERVENTIONS:  - Monitor Blood Glucose as ordered  - Assess for signs and symptoms of hyperglycemia and hypoglycemia  - Administer ordered medications to maintain glucose within target range  - Assess nutritional intake and initiate nutrition service referral as needed  Outcome: Progressing     Problem: SKIN/TISSUE INTEGRITY - ADULT  Goal: Skin integrity remains intact  Description  INTERVENTIONS  - Identify patients at risk for skin breakdown  - Assess and monitor skin integrity  - Assess and monitor nutrition and hydration status  - Monitor labs (i e  albumin)  - Assess for incontinence   - Turn and reposition patient  - Assist with mobility/ambulation  - Relieve pressure over bony prominences  - Avoid friction and shearing  - Provide appropriate hygiene as needed including keeping skin clean and dry  - Evaluate need for skin moisturizer/barrier cream  - Collaborate with interdisciplinary team (i e  Nutrition, Rehabilitation, etc )   - Patient/family teaching  Outcome: Progressing     Problem: MUSCULOSKELETAL - ADULT  Goal: Maintain or return mobility to safest level of function  Description  INTERVENTIONS:  - Assess patient's ability to carry out ADLs; assess patient's baseline for ADL function and identify physical deficits which impact ability to perform ADLs (bathing, care of mouth/teeth, toileting, grooming, dressing, etc )  - Assess/evaluate cause of self-care deficits   - Assess range of motion  - Assess patient's mobility  - Assess patient's need for assistive devices and provide as appropriate  - Encourage maximum independence but intervene and supervise when necessary  - Involve family in performance of ADLs  - Assess for home care needs following discharge   - Consider OT consult to assist with ADL evaluation and planning for discharge  - Provide patient education as appropriate  Outcome: Progressing

## 2019-09-25 NOTE — PLAN OF CARE
Problem: PHYSICAL THERAPY ADULT  Goal: Performs mobility at highest level of function for planned discharge setting  See evaluation for individualized goals  Description  Treatment/Interventions: Functional transfer training, LE strengthening/ROM, Therapeutic exercise, Endurance training, Bed mobility, Gait training, Continued evaluation, Spoke to nursing, OT     See flowsheet documentation for full assessment, interventions and recommendations  Carlos Best PT     Note:   Prognosis: Guarded  Problem List: Decreased strength, Decreased endurance, Impaired balance, Decreased mobility, Decreased cognition, Impaired judgement  Assessment: Pt is 48 y o  male seen for PT evaluation s/p admit to [address] [facility] on 9/24/2019 w/ ADRIANA (acute kidney injury) ([facility] [address] )  PT consulted to assess pt's functional mobility and d/c needs  Order placed for PT eval and tx, w/ activity as tolerated order  Comorbidities affecting pt's physical performance at time of assessment include: weakness, ADRIANA, lethargy, DM, convulsive epilepsy, HTN, hypothyroidism, thrombocytopenia, UTI, toxic metabolic encephalopathy, CP, cerebral paresis w/hoomolateral ataxia  PTA, pt was dependent for all mobility tasks  Personal factors affecting pt at time of IE include: communication issues, decreased initiation and engagement and unable to perform physical activity  Please find objective findings from PT assessment regarding body systems outlined above with impairments and limitations including weakness, impaired balance, decreased endurance, decreased activity tolerance, decreased functional mobility tolerance, decreased safety awareness, impaired judgement and fall risk  The following objective measures performed on IE also reveal limitations: Barthel Index: 15/100  Pt's clinical presentation is currently unstable/unpredictable   Pt to benefit from continued PT tx to address deficits as defined above and maximize level of functional independent mobility and consistency  From PT/mobility standpoint, recommendation at time of d/c would be deferred at this time  Recommendation: Defer at this time     PT - OK to Discharge: No    See flowsheet documentation for full assessment     Tamie Wilson PT

## 2019-09-25 NOTE — ASSESSMENT & PLAN NOTE
· Unclear etiology possibly due to dehydration  · Check urine studies  · Check renal ultrasound - no hydronephrosis, positive left renal calculi  · Follow-up CT abdomen pelvis - bilateral renal stones question pyelonephritis, extensive fecal material   · Consult Nephrology  · IV fluids

## 2019-09-26 ENCOUNTER — APPOINTMENT (INPATIENT)
Dept: NEUROLOGY | Facility: HOSPITAL | Age: 54
DRG: 871 | End: 2019-09-26
Attending: PSYCHIATRY & NEUROLOGY
Payer: MEDICARE

## 2019-09-26 PROBLEM — A41.9 SEPSIS DUE TO URINARY TRACT INFECTION (HCC): Chronic | Status: ACTIVE | Noted: 2019-09-25

## 2019-09-26 PROBLEM — D69.6 THROMBOCYTOPENIA (HCC): Chronic | Status: ACTIVE | Noted: 2019-08-01

## 2019-09-26 PROBLEM — K59.09 OTHER CONSTIPATION: Chronic | Status: ACTIVE | Noted: 2019-09-25

## 2019-09-26 PROBLEM — N17.9 AKI (ACUTE KIDNEY INJURY) (HCC): Chronic | Status: ACTIVE | Noted: 2019-09-24

## 2019-09-26 PROBLEM — N39.0 SEPSIS DUE TO URINARY TRACT INFECTION (HCC): Chronic | Status: ACTIVE | Noted: 2019-09-25

## 2019-09-26 PROBLEM — N30.01 ACUTE CYSTITIS WITH HEMATURIA: Chronic | Status: ACTIVE | Noted: 2019-09-24

## 2019-09-26 PROBLEM — E86.0 ACUTE DEHYDRATION: Chronic | Status: ACTIVE | Noted: 2019-09-25

## 2019-09-26 PROBLEM — G92.8 TOXIC METABOLIC ENCEPHALOPATHY: Chronic | Status: ACTIVE | Noted: 2019-09-24

## 2019-09-26 LAB
ALBUMIN SERPL BCP-MCNC: 2.9 G/DL (ref 3.5–5.7)
ALP SERPL-CCNC: 50 U/L (ref 40–150)
ALT SERPL W P-5'-P-CCNC: 14 U/L (ref 7–52)
AMMONIA PLAS-SCNC: 50 UMOL/L (ref 25–90)
ANION GAP SERPL CALCULATED.3IONS-SCNC: 9 MMOL/L (ref 4–13)
AST SERPL W P-5'-P-CCNC: 22 U/L (ref 13–39)
BASOPHILS # BLD AUTO: 0 THOUSANDS/ΜL (ref 0–0.1)
BASOPHILS NFR BLD AUTO: 0 % (ref 0–2)
BILIRUB SERPL-MCNC: 0.3 MG/DL (ref 0.2–1)
BUN SERPL-MCNC: 80 MG/DL (ref 7–25)
CALCIUM SERPL-MCNC: 8.4 MG/DL (ref 8.6–10.5)
CHLORIDE SERPL-SCNC: 102 MMOL/L (ref 98–107)
CO2 SERPL-SCNC: 22 MMOL/L (ref 21–31)
CREAT SERPL-MCNC: 2.49 MG/DL (ref 0.7–1.3)
CREAT UR-MCNC: 184 MG/DL
EOSINOPHIL # BLD AUTO: 0.1 THOUSAND/ΜL (ref 0–0.61)
EOSINOPHIL NFR BLD AUTO: 1 % (ref 0–5)
EOSINOPHIL NFR URNS MANUAL: 0 %
ERYTHROCYTE [DISTWIDTH] IN BLOOD BY AUTOMATED COUNT: 15.1 % (ref 11.5–14.5)
GFR SERPL CREATININE-BSD FRML MDRD: 29 ML/MIN/1.73SQ M
GLUCOSE SERPL-MCNC: 114 MG/DL (ref 65–99)
GLUCOSE SERPL-MCNC: 123 MG/DL (ref 65–140)
GLUCOSE SERPL-MCNC: 146 MG/DL (ref 65–140)
GLUCOSE SERPL-MCNC: 159 MG/DL (ref 65–140)
GLUCOSE SERPL-MCNC: 162 MG/DL (ref 65–140)
HCT VFR BLD AUTO: 34.9 % (ref 42–47)
HGB BLD-MCNC: 11.8 G/DL (ref 14–18)
LYMPHOCYTES # BLD AUTO: 0.9 THOUSANDS/ΜL (ref 0.6–4.47)
LYMPHOCYTES NFR BLD AUTO: 16 % (ref 21–51)
MCH RBC QN AUTO: 33.8 PG (ref 26–34)
MCHC RBC AUTO-ENTMCNC: 34 G/DL (ref 31–37)
MCV RBC AUTO: 99 FL (ref 81–99)
MICROALBUMIN UR-MCNC: 60.1 MG/L (ref 0–20)
MICROALBUMIN/CREAT 24H UR: 33 MG/G CREATININE (ref 0–30)
MONOCYTES # BLD AUTO: 1.4 THOUSAND/ΜL (ref 0.17–1.22)
MONOCYTES NFR BLD AUTO: 25 % (ref 2–12)
NEUTROPHILS # BLD AUTO: 3.3 THOUSANDS/ΜL (ref 1.4–6.5)
NEUTS SEG NFR BLD AUTO: 57 % (ref 42–75)
OSMOLALITY UR: 344 MMOL/KG
PLATELET # BLD AUTO: 51 THOUSANDS/UL (ref 149–390)
PMV BLD AUTO: 10.6 FL (ref 8.6–11.7)
POTASSIUM SERPL-SCNC: 4.2 MMOL/L (ref 3.5–5.5)
PROT SERPL-MCNC: 5.4 G/DL (ref 6.4–8.9)
PROT UR-MCNC: 30 MG/DL
PROT/CREAT UR: 0.16 MG/G{CREAT} (ref 0–0.1)
RBC # BLD AUTO: 3.51 MILLION/UL (ref 4.3–5.9)
SODIUM 24H UR-SCNC: 44 MOL/L
SODIUM SERPL-SCNC: 133 MMOL/L (ref 134–143)
VALPROATE SERPL-MCNC: 68.7 UG/ML (ref 50–125)
WBC # BLD AUTO: 5.7 THOUSAND/UL (ref 4.8–10.8)

## 2019-09-26 PROCEDURE — 99233 SBSQ HOSP IP/OBS HIGH 50: CPT | Performed by: NURSE PRACTITIONER

## 2019-09-26 PROCEDURE — 95816 EEG AWAKE AND DROWSY: CPT

## 2019-09-26 PROCEDURE — 95812 EEG 41-60 MINUTES: CPT | Performed by: PSYCHIATRY & NEUROLOGY

## 2019-09-26 PROCEDURE — 82140 ASSAY OF AMMONIA: CPT | Performed by: PSYCHIATRY & NEUROLOGY

## 2019-09-26 PROCEDURE — 80164 ASSAY DIPROPYLACETIC ACD TOT: CPT | Performed by: NURSE PRACTITIONER

## 2019-09-26 PROCEDURE — 85025 COMPLETE CBC W/AUTO DIFF WBC: CPT | Performed by: NURSE PRACTITIONER

## 2019-09-26 PROCEDURE — 97110 THERAPEUTIC EXERCISES: CPT

## 2019-09-26 PROCEDURE — 97530 THERAPEUTIC ACTIVITIES: CPT

## 2019-09-26 PROCEDURE — 80053 COMPREHEN METABOLIC PANEL: CPT | Performed by: NURSE PRACTITIONER

## 2019-09-26 PROCEDURE — 82948 REAGENT STRIP/BLOOD GLUCOSE: CPT

## 2019-09-26 PROCEDURE — 99232 SBSQ HOSP IP/OBS MODERATE 35: CPT | Performed by: INTERNAL MEDICINE

## 2019-09-26 RX ADMIN — HYDROXYZINE HYDROCHLORIDE 25 MG: 25 TABLET ORAL at 17:08

## 2019-09-26 RX ADMIN — BISACODYL 10 MG: 10 SUPPOSITORY RECTAL at 08:24

## 2019-09-26 RX ADMIN — QUETIAPINE FUMARATE 100 MG: 50 TABLET, EXTENDED RELEASE ORAL at 21:30

## 2019-09-26 RX ADMIN — QUETIAPINE FUMARATE 50 MG: 50 TABLET, EXTENDED RELEASE ORAL at 08:25

## 2019-09-26 RX ADMIN — INSULIN LISPRO 1 UNITS: 100 INJECTION, SOLUTION INTRAVENOUS; SUBCUTANEOUS at 17:07

## 2019-09-26 RX ADMIN — DOCUSATE SODIUM 100 MG: 100 CAPSULE, LIQUID FILLED ORAL at 17:09

## 2019-09-26 RX ADMIN — DOCUSATE SODIUM 100 MG: 100 CAPSULE, LIQUID FILLED ORAL at 08:24

## 2019-09-26 RX ADMIN — LEVOTHYROXINE SODIUM 100 MCG: 100 TABLET ORAL at 05:01

## 2019-09-26 RX ADMIN — QUETIAPINE FUMARATE 50 MG: 50 TABLET, EXTENDED RELEASE ORAL at 17:08

## 2019-09-26 RX ADMIN — SODIUM CHLORIDE 100 ML/HR: 9 INJECTION, SOLUTION INTRAVENOUS at 15:33

## 2019-09-26 RX ADMIN — QUETIAPINE FUMARATE 150 MG: 50 TABLET, EXTENDED RELEASE ORAL at 08:24

## 2019-09-26 RX ADMIN — INSULIN LISPRO 1 UNITS: 100 INJECTION, SOLUTION INTRAVENOUS; SUBCUTANEOUS at 21:30

## 2019-09-26 RX ADMIN — HYDROXYZINE HYDROCHLORIDE 25 MG: 25 TABLET ORAL at 08:24

## 2019-09-26 RX ADMIN — BENZTROPINE MESYLATE 0.5 MG: 1 TABLET ORAL at 17:09

## 2019-09-26 RX ADMIN — SODIUM CHLORIDE 150 MG: 9 INJECTION, SOLUTION INTRAVENOUS at 19:30

## 2019-09-26 RX ADMIN — CEFTRIAXONE 1000 MG: 1 INJECTION, SOLUTION INTRAVENOUS at 14:18

## 2019-09-26 RX ADMIN — SODIUM CHLORIDE 100 ML/HR: 9 INJECTION, SOLUTION INTRAVENOUS at 03:57

## 2019-09-26 RX ADMIN — QUETIAPINE FUMARATE 300 MG: 150 TABLET, EXTENDED RELEASE ORAL at 21:29

## 2019-09-26 RX ADMIN — QUETIAPINE FUMARATE 150 MG: 50 TABLET, EXTENDED RELEASE ORAL at 17:08

## 2019-09-26 RX ADMIN — BENZTROPINE MESYLATE 0.5 MG: 1 TABLET ORAL at 08:24

## 2019-09-26 RX ADMIN — SODIUM CHLORIDE 150 MG: 9 INJECTION, SOLUTION INTRAVENOUS at 08:21

## 2019-09-26 RX ADMIN — HYDROXYZINE HYDROCHLORIDE 25 MG: 25 TABLET ORAL at 21:29

## 2019-09-26 NOTE — NURSING NOTE
Pt presently having eeg done and tolerating fair, pt repositioned with good skin care done, callbell in reach of the pt

## 2019-09-26 NOTE — PLAN OF CARE
Problem: OCCUPATIONAL THERAPY ADULT  Goal: Performs self-care activities at highest level of function for planned discharge setting  See evaluation for individualized goals  Description  Treatment Interventions: ADL retraining, Functional transfer training, UE strengthening/ROM, Endurance training, Cognitive reorientation, Patient/family training, Equipment evaluation/education, Neuromuscular reeducation, Compensatory technique education, Continued evaluation, Activityengagement          See flowsheet documentation for full assessment, interventions and recommendations  Outcome: Progressing  Note:   Limitation: Decreased ADL status, Decreased UE ROM, Decreased UE strength, Decreased cognition, Decreased endurance, Decreased self-care trans, Decreased high-level ADLs  Prognosis: Fair  Assessment: Patient participated in Skilled OT session this date with interventions consisting of therapeutic exercise to: increase functional use of BUEs, increase BUE muscle strength , and increase general activity tolerance to resume baseline activities   Patient agreeable to OT treatment session, upon arrival patient was found semi-reclined in bed  Patient requiring verbal cues for correct technique and frequent rest periods  Patient continues to be functioning below baseline level, occupational performance remains limited secondary to factors listed above and increased risk for falls and injury  From OT standpoint, recommendation for discharge is to be determined pending progress  Patient to benefit from continued Occupational Therapy treatment while in the hospital to address deficits as defined above and maximize level of functional independence with ADLs and functional mobility        OT Discharge Recommendation: Other (Comment)(Defer at this time )  OT - OK to Discharge: (Once medically cleared)     OLAYINKA Fraser/JANEL

## 2019-09-26 NOTE — PHYSICAL THERAPY NOTE
09/26/19 0849   Pain Assessment   Pain Assessment No/denies pain   Pain Score No Pain   Restrictions/Precautions   Weight Bearing Precautions Per Order No   Other Precautions Cognitive; Fall Risk   General   Chart Reviewed Yes   Response to Previous Treatment Patient with no complaints from previous session  Family/Caregiver Present No   Cognition   Overall Cognitive Status Impaired   Arousal/Participation Responsive; Cooperative   Attention Attends with cues to redirect   Orientation Level Oriented to person   Memory Unable to assess   Following Commands Follows one step commands inconsistently   Subjective   Subjective " I want to go home "   Bed Mobility   Rolling R 3  Moderate assistance   Additional items Assist x 2; Increased time required;Verbal cues;LE management   Supine to Sit 3  Moderate assistance   Additional items Assist x 2; Increased time required;Verbal cues;LE management   Sit to Supine 3  Moderate assistance   Additional items Assist x 2; Increased time required;Verbal cues;LE management   Transfers   Sit to Stand Unable to assess   Balance   Static Sitting Poor   Dynamic Sitting Poor -   Endurance Deficit   Endurance Deficit Yes   Activity Tolerance   Activity Tolerance Patient limited by fatigue   Exercises   Heelslides Supine;20 reps;AAROM; Bilateral   Hip Flexion Supine;20 reps;AAROM; Bilateral   Hip Abduction Supine;20 reps;AAROM; Bilateral   Hip Adduction Supine;20 reps;AAROM; Bilateral   Knee AROM Short Arc Quad Sitting;20 reps;AAROM; Bilateral   Ankle Pumps Supine;20 reps;AAROM; Bilateral   Assessment   Prognosis Guarded   Problem List Decreased strength;Decreased endurance; Impaired balance;Decreased mobility; Decreased coordination;Decreased cognition; Impaired judgement;Decreased safety awareness   Assessment Pt  found resting in bed but agreeable to participate with PT treatment    Pt  performed ther ex on BLE x 20 reps as per treatment flow sheet with assistance to move BLE through full ROM and for proper technique  Pt   denied pain throughout treatment  Pt  then rolled to his R side with mod Ax2 with verbal cues and assistance with LE management  Pt  required mod Ax2 for transfers from supine to sit  Pt 's sitting balance was poor and required mod Ax1 to maintain sitting balance  Pt  sat for about 1 min and then stated "no more"  Pt  then assisted back into bed with mod Ax2 and positioned for comfort with all needs in reach  Pt  was unable to answer why he want to stop treatment, but did not seem to be in any distress  Pt  left with all needs in reach and SCD's applied  Continue current POC and progress as able  Goals   Patient Goals to go home   PT Treatment Day 1   Plan   Treatment/Interventions Functional transfer training; Therapeutic exercise; Endurance training;Patient/family training;Bed mobility;Gait training   Progress Slow progress, cognitive deficits   PT Frequency   (3-5x/wk)   Recommendation   Recommendation Defer at this time   PT - OK to Discharge No   Larwance Bounds, PTA

## 2019-09-26 NOTE — PROGRESS NOTES
Progress Note - Ria Mckeon 48 y o  male MRN: [id number]    Unit/Bed#: -01 Encounter: [id number]      Assessment:  Urinary retention, renal failure, creatinine improved this morning to 2 45  Plan:  Continue Carrillo catheter for now  Continue to follow renal function  Subjective:   Appears more awake and alert today  Tolerating Carrillo well  Objective:     Vitals: Blood pressure 137/74, pulse 81, temperature (!) 97 3 °F (36 3 °C), temperature source Tympanic, resp  rate 18, height 5' 10" (1 778 m), weight 82 6 kg (182 lb 1 6 oz), SpO2 90 %  ,Body mass index is 26 13 kg/m²  Intake/Output Summary (Last 24 hours) at 9/26/2019 1726  Last data filed at 9/26/2019 1701  Gross per 24 hour   Intake 2147 ml   Output 1750 ml   Net 397 ml       Physical Exam: Male genitalia: normal   Carrillo catheter draining clear yellow urine    Invasive Devices     Peripheral Intravenous Line            Peripheral IV 09/24/19 Right Antecubital 2 days          Drain            Urethral Catheter 16 Fr  less than 1 day                Lab, Imaging and other studies: I have personally reviewed pertinent reports      VTE Pharmacologic Prophylaxis: Sequential compression device (Venodyne)   VTE Mechanical Prophylaxis: sequential compression device

## 2019-09-26 NOTE — ASSESSMENT & PLAN NOTE
· As evidence by elevated white blood cell count of 12 90, hyponatremia, tachycardia, acute kidney injury  · Patient also with kidney stones  · Continue IV Rocephin    · Continue IV fluids  · Significant improvement, white count returned to normal, hyponatremia improving

## 2019-09-26 NOTE — ASSESSMENT & PLAN NOTE
Lab Results   Component Value Date    HGBA1C 5 8 08/28/2019       Recent Labs     09/25/19  1609 09/25/19  2043 09/26/19  0608 09/26/19  1031   POCGLU 130 145* 123 146*       Blood Sugar Average: Last 72 hrs:  (P) 109 25   · Hold metformin  · Sliding scale insulin

## 2019-09-26 NOTE — ASSESSMENT & PLAN NOTE
· Likely due to above-mentioned medical problems  · If no improvement in mental status over the next 24 hours, will consider Neurology evaluation  · Since medications have been discontinued patient is more awake and alert than he had been the day prior    · Neurology did order EEG

## 2019-09-26 NOTE — ASSESSMENT & PLAN NOTE
· Valproic acid level noted to be elevated, will hold for now  · We will hold Zonegran as well in light of acute kidney injury  · If no improvement in renal function, will consider Neurology consultation for medication adjustments  · Neurology may changes:  Discontinue Depakote, Zonegran, Neurontin and start Vimpat  · Neurology ordered EEG  · There is consideration to decrease Seroquel dose as well

## 2019-09-26 NOTE — SOCIAL WORK
Discussed the POC with the rounding team   As per PT the pt will need STR  Pt has a history of cerebral palsy and epilepsy  Pt CG is not available for signatures for consent for the PASAR process  Spoke to the CG who is with her mother in another hospital   Pt CG will sign the paperwork in the am   Faxed all clinical to [facility]  Pt foster sister is at the bedside  Pt foster sister states she is not a blood relative of the pt

## 2019-09-26 NOTE — OCCUPATIONAL THERAPY NOTE
09/26/19 1351   Restrictions/Precautions   Weight Bearing Precautions Per Order No   Other Precautions Cognitive; Fall Risk   General   Family/Caregiver Present sister present (Tyra Gonzalez came to her family as a foster child when he was 13 or 12, and became a permanent member of the family, per this sister)    Lifestyle   Reciprocal Relationships has a caregiver "Camren Dietz" who he stays with most of the time    Pain Assessment   Pain Assessment No/denies pain   ADL   Grooming Comments He agreed to wipe his face with washcloth provided but had difficulty completing same (for lack of reach)    Therapeutic Excerise-Strength   UE Strength Yes  (patient engaged briefly then wanted to stop)   Right Upper Extremity- Strength   R Shoulder Flexion; Horizontal ABduction  (with some Active-Assist  )   R Weight/Reps/Sets 15 reps 2 sets   Left Upper Extremity-Strength   L Shoulder Flexion; Horizontal ABduction  (with some Active-Assist )   L Weights/Reps/Sets 15 reps 2 sets   Coordination   Gross Motor impaired    Cognition   Overall Cognitive Status Impaired   Arousal/Participation Responsive;Lethargic  (marginally cooperative)   Comments sister reports to expect primarily Yes and No answers, as verbalizations   Activity Tolerance   Activity Tolerance Patient limited by fatigue   Assessment   Assessment Patient participated in Skilled OT session this date with interventions consisting of therapeutic exercise to: increase functional use of BUEs, increase BUE muscle strength , and increase general activity tolerance to resume baseline activities   Patient agreeable to OT treatment session, upon arrival patient was found semi-reclined in bed  Patient requiring verbal cues for correct technique and frequent rest periods  Patient continues to be functioning below baseline level, occupational performance remains limited secondary to factors listed above and increased risk for falls and injury     From OT standpoint, recommendation for discharge is to be determined pending progress  Patient to benefit from continued Occupational Therapy treatment while in the hospital to address deficits as defined above and maximize level of functional independence with ADLs and functional mobility  Plan   Treatment Interventions UE strengthening/ROM; Activityengagement   Goal Expiration Date 10/05/19   OT Treatment Day [address] OLAYINKA Reddy/L

## 2019-09-26 NOTE — PHYSICIAN ADVISOR
Current patient class: Inpatient  The patient is currently on Hospital Day: 2 at [address]     The patient is  documented to require at least a 2nd midnight in the hospital  As such the patient is  expected to satisfy the 2 midnight benchmark and is therefore eligible for inpatient admission  After review of the relevant documentation, labs, vital signs and test results, the patient is appropriate for INPATIENT ADMISSION  Admission to the hospital as an inpatient is a complex decision making process which requires the practitioner to consider the patients presenting complaint, history and physical examination and all relevant testing  With this in mind, in this case, the patient was deemed appropriate for INPATIENT ADMISSION  After review of the documentation and testing available at the time of the admission I concur with this clinical determination of medical necessity  Rationale is as follows: The patient is a 48 yrs Male who presented to the ED at 9/24/2019 12:01 PM with a chief complaint of Vomiting and Weakness - Generalized (states dosent feel well, unable to ambulate at home )  Patient admitted for generalized convulsive epilepsy and toxic metabolic encephalopathy  - labs showed , leukocytosis ADRIANA  And hyponatremia  - she was started on IV fluids with serial monitoring of labs , CT abdomen and pelvis ordered for further workup   She was unable to void overnight - bladder scan showed 408 ml retention and she was straight cathed     Seen in consultation by nephrology for ADRIANA - urine studies showed hematuria and proteinuria- CT scan showed perinephric stranding , he was started on IV antibiotics and felt to have sepsis se to UTI    neurology was contacted for antiseizure adjusted given ADRIANA -meds were d/praveen and he was loaded with renal dosed Vimpat - IV 1 dose today and started on BID tomorrow   Routine EEG to make sure no focal seizures     The patients vitals on arrival were ED Triage Vitals   Temperature Pulse Respirations Blood Pressure SpO2   09/24/19 1202 09/24/19 1205 09/24/19 1205 09/24/19 1205 09/24/19 1205   97 6 °F (36 4 °C) 99 18 100/60 95 %      Temp Source Heart Rate Source Patient Position - Orthostatic VS BP Location FiO2 (%)   09/24/19 1202 09/24/19 1205 09/24/19 1205 09/24/19 1205 --   Temporal Monitor Sitting Left arm       Pain Score       09/24/19 1205       No Pain           Past Medical History:   Diagnosis Date    ADRIANA [facility] kidney [facility]) ([facility] [address] ) 9/24/2019    [facility]re[facility]     Disease of thyroid gland     Hypertension     Osteoporosis     Psychiatric disorder     Scoliosis     Seizures ([facility] )      History reviewed  No pertinent surgical history      The patient was admitted to the hospital at [phone] on 9/24/19 for the following diagnosis:  Vomiting [R11 10]  UTI (urinary tract infection) [N39 0]  Weakness generalized [R53 1]  Acute renal failure (ARF) (HCC) [N17 9]  Valproic acid toxicity [T42 6X1A]    Consults have been placed to:   IP CONSULT TO NEPHROLOGY  IP CONSULT TO UROLOGY  IP CONSULT TO NEUROLOGY    Vitals:    09/25/19 0006 09/25/19 0711 09/25/19 1451 09/25/19 1540   BP: 119/62 95/50  119/57   BP Location: Right arm Left arm  Left arm   Pulse: 104 94  87   Resp: 16 18  16   Temp: (!) 97 1 °F (36 2 °C) 99 1 °F (37 3 °C)  98 4 °F (36 9 °C)   TempSrc: Tympanic Tympanic  Temporal   SpO2: 95% 91%  91%   Weight:   83 2 kg (183 lb 6 8 oz)    Height:   5' 10" (1 778 m)        Most recent labs:    Recent Labs     09/24/19  1302 09/25/19  0525   WBC 12 90* 7 70   HGB 11 1* 10 6*   HCT 33 3* 30 7*   PLT 53* 53*   K 4 1 4 0   CALCIUM 8 3* 8 4*   BUN 70* 81*   CREATININE 3 91* 3 89*   LIPASE 15  --    INR 1 15  --    TROPONINI <0 03  --    AST 23 17   ALT 10 9   ALKPHOS 45 38*       Scheduled Meds:  Current Facility-Administered Medications:  acetaminophen 650 mg Oral Q6H PRN Sai Fajardo MD    benztropine 0 5 mg Oral BID Sai Fajardo MD bisacodyl 10 mg Rectal Daily Porsha Quijano MD    cefTRIAXone 1,000 mg Intravenous Q24H Porsha Quijano MD Last Rate: 1,000 mg (09/25/19 1720)   docusate sodium 100 mg Oral BID Porsha Quijano MD    hydrOXYzine HCL 25 mg Oral TID Porsha Quijano MD    insulin lispro 1-5 Units Subcutaneous HS Porsha Quijano MD    insulin lispro 1-6 Units Subcutaneous TID Manuel Ceballos MD    [START ON 9/26/2019] lacosamide (VIMPAT) IVPB 150 mg Intravenous Q12H Maria Dolores Jean DO    levothyroxine 100 mcg Oral Early Morning Samara Whelan PA-C    ondansetron 4 mg Intravenous Once Porsha Quijano MD    QUEtiapine 300 mg Oral HS Ml Parada MD    And        QUEtiapine 100 mg Oral HS Ml Parada MD    QUEtiapine 150 mg Oral BID Ml Parada MD    And        QUEtiapine 50 mg Oral BID Ml Parada MD    senna 2 tablet Oral Daily PRN Porsha Quijano MD    sodium chloride 100 mL/hr Intravenous Continuous Porsha Quijano MD Last Rate: 100 mL/hr (09/25/19 1639)     Continuous Infusions:  sodium chloride 100 mL/hr Last Rate: 100 mL/hr (09/25/19 1639)     PRN Meds:   acetaminophen    senna    Surgical procedures (if appropriate):

## 2019-09-26 NOTE — PLAN OF CARE
Problem: Prexisting or High Potential for Compromised Skin Integrity  Goal: Skin integrity is maintained or improved  Description  INTERVENTIONS:  - Identify patients at risk for skin breakdown  - Assess and monitor skin integrity  - Assess and monitor nutrition and hydration status  - Monitor labs   - Assess for incontinence   - Turn and reposition patient  - Assist with mobility/ambulation  - Relieve pressure over bony prominences  - Avoid friction and shearing  - Provide appropriate hygiene as needed including keeping skin clean and dry  - Evaluate need for skin moisturizer/barrier cream  - Collaborate with interdisciplinary team   - Patient/family teaching  - Consider wound care consult   Outcome: Progressing     Problem: Potential for Falls  Goal: Patient will remain free of falls  Description  INTERVENTIONS:  - Assess patient frequently for physical needs  -  Identify cognitive and physical deficits and behaviors that affect risk of falls    -  [place] fall precautions as indicated by assessment   - Educate patient/family on patient safety including physical limitations  - Instruct patient to call for assistance with activity based on assessment  - Modify environment to reduce risk of injury  - Consider OT/PT consult to assist with strengthening/mobility  Outcome: Progressing     Problem: PAIN - ADULT  Goal: Verbalizes/displays adequate comfort level or baseline comfort level  Description  Interventions:  - Encourage patient to monitor pain and request assistance  - Assess pain using appropriate pain scale  - Administer analgesics based on type and severity of pain and evaluate response  - Implement non-pharmacological measures as appropriate and evaluate response  - Consider cultural and social influences on pain and pain management  - Notify physician/advanced practitioner if interventions unsuccessful or patient reports new pain  Outcome: Progressing     Problem: INFECTION - ADULT  Goal: Absence or prevention of progression during hospitalization  Description  INTERVENTIONS:  - Assess and monitor for signs and symptoms of infection  - Monitor lab/diagnostic results  - Monitor all insertion sites, i e  indwelling lines, tubes, and drains  - Monitor endotracheal if appropriate and nasal secretions for changes in amount and color  - [place] appropriate cooling/warming therapies per order  - Administer medications as ordered  - Instruct and encourage patient and family to use good hand hygiene technique  - Identify and instruct in appropriate isolation precautions for identified infection/condition  Outcome: Progressing     Problem: SAFETY ADULT  Goal: Maintain or return to baseline ADL function  Description  INTERVENTIONS:  -  Assess patient's ability to carry out ADLs; assess patient's baseline for ADL function and identify physical deficits which impact ability to perform ADLs (bathing, care of mouth/teeth, toileting, grooming, dressing, etc )  - Assess/evaluate cause of self-care deficits   - Assess range of motion  - Assess patient's mobility; develop plan if impaired  - Assess patient's need for assistive devices and provide as appropriate  - Encourage maximum independence but intervene and supervise when necessary  - Involve family in performance of ADLs  - Assess for home care needs following discharge   - Consider OT consult to assist with ADL evaluation and planning for discharge  - Provide patient education as appropriate  Outcome: Progressing  Goal: Maintain or return mobility status to optimal level  Description  INTERVENTIONS:  - Assess patient's baseline mobility status (ambulation, transfers, stairs, etc )    - Identify cognitive and physical deficits and behaviors that affect mobility  - Identify mobility aids required to assist with transfers and/or ambulation (gait belt, sit-to-stand, lift, walker, cane, etc )  - [place] fall precautions as indicated by assessment  - Record patient progress and toleration of activity level on Mobility SBAR; progress patient to next Phase/Stage  - Instruct patient to call for assistance with activity based on assessment  - Consider rehabilitation consult to assist with strengthening/weightbearing, etc   Outcome: Progressing     Problem: DISCHARGE PLANNING  Goal: Discharge to home or other facility with appropriate resources  Description  INTERVENTIONS:  - Identify barriers to discharge w/patient and caregiver  - Arrange for needed discharge resources and transportation as appropriate  - Identify discharge learning needs (meds, wound care, etc )  - Arrange for interpretive services to assist at discharge as needed  - Refer to Case Management Department for coordinating discharge planning if the patient needs post-hospital services based on physician/advanced practitioner order or complex needs related to functional status, cognitive ability, or social support system  Outcome: Progressing     Problem: Knowledge Deficit  Goal: Patient/family/caregiver demonstrates understanding of disease process, treatment plan, medications, and discharge instructions  Description  Complete learning assessment and assess knowledge base    Interventions:  - Provide teaching at level of understanding  - Provide teaching via preferred learning methods  Outcome: Progressing     Problem: NEUROSENSORY - ADULT  Goal: Remains free of injury related to seizures activity  Description  INTERVENTIONS  - Maintain airway, patient safety  and administer oxygen as ordered  - Monitor patient for seizure activity, document and report duration and description of seizure to physician/advanced practitioner  - If seizure occurs,  ensure patient safety during seizure  - Reorient patient post seizure  - Seizure pads on all 4 side rails  - Instruct patient/family to notify RN of any seizure activity including if an aura is experienced  - Instruct patient/family to call for assistance with activity based on nursing assessment  - Administer anti-seizure medications if ordered    Outcome: Progressing     Problem: GASTROINTESTINAL - ADULT  Goal: Minimal or absence of nausea and/or vomiting  Description  INTERVENTIONS:  - Administer IV fluids if ordered to ensure adequate hydration  - Maintain NPO status until nausea and vomiting are resolved  - Nasogastric tube if ordered  - Administer ordered antiemetic medications as needed  - Provide nonpharmacologic comfort measures as appropriate  - Advance diet as tolerated, if ordered  - Consider nutrition services referral to assist patient with adequate nutrition and appropriate food choices  Outcome: Progressing  Goal: Maintains or returns to baseline bowel function  Description  INTERVENTIONS:  - Assess bowel function  - Encourage oral fluids to ensure adequate hydration  - Administer IV fluids if ordered to ensure adequate hydration  - Administer ordered medications as needed  - Encourage mobilization and activity  - Consider nutritional services referral to assist patient with adequate nutrition and appropriate food choices  Outcome: Progressing  Goal: Maintains adequate nutritional intake  Description  INTERVENTIONS:  - Monitor percentage of each meal consumed  - Identify factors contributing to decreased intake, treat as appropriate  - Assist with meals as needed  - Monitor I&O, weight, and lab values if indicated  - Obtain nutrition services referral as needed  Outcome: Progressing     Problem: GENITOURINARY - ADULT  Goal: Maintains or returns to baseline urinary function  Description  INTERVENTIONS:  - Assess urinary function  - Encourage oral fluids to ensure adequate hydration if ordered  - Administer IV fluids as ordered to ensure adequate hydration  - Administer ordered medications as needed  - Offer frequent toileting  - Follow urinary retention protocol if ordered  Outcome: Progressing  Goal: Absence of urinary retention  Description  INTERVENTIONS:  - Assess patients ability to void and empty bladder  - Monitor I/O  - Bladder scan as needed  - Discuss with physician/AP medications to alleviate retention as needed  - Discuss catheterization for long term situations as appropriate  Outcome: Progressing     Problem: METABOLIC, FLUID AND ELECTROLYTES - ADULT  Goal: Glucose maintained within target range  Description  INTERVENTIONS:  - Monitor Blood Glucose as ordered  - Assess for signs and symptoms of hyperglycemia and hypoglycemia  - Administer ordered medications to maintain glucose within target range  - Assess nutritional intake and initiate nutrition service referral as needed  Outcome: Progressing     Problem: SKIN/TISSUE INTEGRITY - ADULT  Goal: Skin integrity remains intact  Description  INTERVENTIONS  - Identify patients at risk for skin breakdown  - Assess and monitor skin integrity  - Assess and monitor nutrition and hydration status  - Monitor labs (i e  albumin)  - Assess for incontinence   - Turn and reposition patient  - Assist with mobility/ambulation  - Relieve pressure over bony prominences  - Avoid friction and shearing  - Provide appropriate hygiene as needed including keeping skin clean and dry  - Evaluate need for skin moisturizer/barrier cream  - Collaborate with interdisciplinary team (i e  Nutrition, Rehabilitation, etc )   - Patient/family teaching  Outcome: Progressing     Problem: MUSCULOSKELETAL - ADULT  Goal: Maintain or return mobility to safest level of function  Description  INTERVENTIONS:  - Assess patient's ability to carry out ADLs; assess patient's baseline for ADL function and identify physical deficits which impact ability to perform ADLs (bathing, care of mouth/teeth, toileting, grooming, dressing, etc )  - Assess/evaluate cause of self-care deficits   - Assess range of motion  - Assess patient's mobility  - Assess patient's need for assistive devices and provide as appropriate  - Encourage maximum independence but intervene and supervise when necessary  - Involve family in performance of ADLs  - Assess for home care needs following discharge   - Consider OT consult to assist with ADL evaluation and planning for discharge  - Provide patient education as appropriate  Outcome: Progressing     Problem: DISCHARGE PLANNING - CARE MANAGEMENT  Goal: Discharge to post-acute care or home with appropriate resources  Description  INTERVENTIONS:  - Conduct assessment to determine patient/family and health care team treatment goals, and need for post-acute services based on payer coverage, community resources, and patient preferences, and barriers to discharge  - Address psychosocial, clinical, and financial barriers to discharge as identified in assessment in conjunction with the patient/family and health care team  - Arrange appropriate level of post-acute services according to patient's   needs and preference and payer coverage in collaboration with the physician and health care team  - Communicate with and update the patient/family, physician, and health care team regarding progress on the discharge plan  - Arrange appropriate transportation to post-acute venues  Pt will go home with [facility] who will provide transportation     Outcome: Progressing

## 2019-09-26 NOTE — ASSESSMENT & PLAN NOTE
· Unclear etiology possibly due to dehydration  · Check urine studies-pending  · Check renal ultrasound - no hydronephrosis, positive left renal calculi  · Follow-up CT abdomen pelvis - bilateral renal stones question pyelonephritis, extensive fecal material   · Consult Nephrology - most likely secondary to dehydration due to medications  · Kidney functions are and improving with IV fluids  · IV fluids

## 2019-09-26 NOTE — PLAN OF CARE
Problem: Prexisting or High Potential for Compromised Skin Integrity  Goal: Skin integrity is maintained or improved  Description  INTERVENTIONS:  - Identify patients at risk for skin breakdown  - Assess and monitor skin integrity  - Assess and monitor nutrition and hydration status  - Monitor labs   - Assess for incontinence   - Turn and reposition patient  - Assist with mobility/ambulation  - Relieve pressure over bony prominences  - Avoid friction and shearing  - Provide appropriate hygiene as needed including keeping skin clean and dry  - Evaluate need for skin moisturizer/barrier cream  - Collaborate with interdisciplinary team   - Patient/family teaching  - Consider wound care consult   Outcome: Progressing     Problem: Potential for Falls  Goal: Patient will remain free of falls  Description  INTERVENTIONS:  - Assess patient frequently for physical needs  -  Identify cognitive and physical deficits and behaviors that affect risk of falls    -  [place] fall precautions as indicated by assessment   - Educate patient/family on patient safety including physical limitations  - Instruct patient to call for assistance with activity based on assessment  - Modify environment to reduce risk of injury  - Consider OT/PT consult to assist with strengthening/mobility  Outcome: Progressing     Problem: PAIN - ADULT  Goal: Verbalizes/displays adequate comfort level or baseline comfort level  Description  Interventions:  - Encourage patient to monitor pain and request assistance  - Assess pain using appropriate pain scale  - Administer analgesics based on type and severity of pain and evaluate response  - Implement non-pharmacological measures as appropriate and evaluate response  - Consider cultural and social influences on pain and pain management  - Notify physician/advanced practitioner if interventions unsuccessful or patient reports new pain  Outcome: Progressing     Problem: INFECTION - ADULT  Goal: Absence or prevention of progression during hospitalization  Description  INTERVENTIONS:  - Assess and monitor for signs and symptoms of infection  - Monitor lab/diagnostic results  - Monitor all insertion sites, i e  indwelling lines, tubes, and drains  - Monitor endotracheal if appropriate and nasal secretions for changes in amount and color  - Laura appropriate cooling/warming therapies per order  - Administer medications as ordered  - Instruct and encourage patient and family to use good hand hygiene technique  - Identify and instruct in appropriate isolation precautions for identified infection/condition  Outcome: Progressing     Problem: SAFETY ADULT  Goal: Maintain or return to baseline ADL function  Description  INTERVENTIONS:  -  Assess patient's ability to carry out ADLs; assess patient's baseline for ADL function and identify physical deficits which impact ability to perform ADLs (bathing, care of mouth/teeth, toileting, grooming, dressing, etc )  - Assess/evaluate cause of self-care deficits   - Assess range of motion  - Assess patient's mobility; develop plan if impaired  - Assess patient's need for assistive devices and provide as appropriate  - Encourage maximum independence but intervene and supervise when necessary  - Involve family in performance of ADLs  - Assess for home care needs following discharge   - Consider OT consult to assist with ADL evaluation and planning for discharge  - Provide patient education as appropriate  Outcome: Progressing  Goal: Maintain or return mobility status to optimal level  Description  INTERVENTIONS:  - Assess patient's baseline mobility status (ambulation, transfers, stairs, etc )    - Identify cognitive and physical deficits and behaviors that affect mobility  - Identify mobility aids required to assist with transfers and/or ambulation (gait belt, sit-to-stand, lift, walker, cane, etc )  - Laura fall precautions as indicated by assessment  - Record patient progress and toleration of activity level on Mobility SBAR; progress patient to next Phase/Stage  - Instruct patient to call for assistance with activity based on assessment  - Consider rehabilitation consult to assist with strengthening/weightbearing, etc   Outcome: Progressing     Problem: DISCHARGE PLANNING  Goal: Discharge to home or other facility with appropriate resources  Description  INTERVENTIONS:  - Identify barriers to discharge w/patient and caregiver  - Arrange for needed discharge resources and transportation as appropriate  - Identify discharge learning needs (meds, wound care, etc )  - Arrange for interpretive services to assist at discharge as needed  - Refer to Case Management Department for coordinating discharge planning if the patient needs post-hospital services based on physician/advanced practitioner order or complex needs related to functional status, cognitive ability, or social support system  Outcome: Progressing     Problem: Knowledge Deficit  Goal: Patient/family/caregiver demonstrates understanding of disease process, treatment plan, medications, and discharge instructions  Description  Complete learning assessment and assess knowledge base    Interventions:  - Provide teaching at level of understanding  - Provide teaching via preferred learning methods  Outcome: Progressing     Problem: NEUROSENSORY - ADULT  Goal: Remains free of injury related to seizures activity  Description  INTERVENTIONS  - Maintain airway, patient safety  and administer oxygen as ordered  - Monitor patient for seizure activity, document and report duration and description of seizure to physician/advanced practitioner  - If seizure occurs,  ensure patient safety during seizure  - Reorient patient post seizure  - Seizure pads on all 4 side rails  - Instruct patient/family to notify RN of any seizure activity including if an aura is experienced  - Instruct patient/family to call for assistance with activity based on nursing assessment  - Administer anti-seizure medications if ordered    Outcome: Progressing     Problem: GASTROINTESTINAL - ADULT  Goal: Minimal or absence of nausea and/or vomiting  Description  INTERVENTIONS:  - Administer IV fluids if ordered to ensure adequate hydration  - Maintain NPO status until nausea and vomiting are resolved  - Nasogastric tube if ordered  - Administer ordered antiemetic medications as needed  - Provide nonpharmacologic comfort measures as appropriate  - Advance diet as tolerated, if ordered  - Consider nutrition services referral to assist patient with adequate nutrition and appropriate food choices  Outcome: Progressing  Goal: Maintains or returns to baseline bowel function  Description  INTERVENTIONS:  - Assess bowel function  - Encourage oral fluids to ensure adequate hydration  - Administer IV fluids if ordered to ensure adequate hydration  - Administer ordered medications as needed  - Encourage mobilization and activity  - Consider nutritional services referral to assist patient with adequate nutrition and appropriate food choices  Outcome: Progressing  Goal: Maintains adequate nutritional intake  Description  INTERVENTIONS:  - Monitor percentage of each meal consumed  - Identify factors contributing to decreased intake, treat as appropriate  - Assist with meals as needed  - Monitor I&O, weight, and lab values if indicated  - Obtain nutrition services referral as needed  Outcome: Progressing     Problem: GENITOURINARY - ADULT  Goal: Maintains or returns to baseline urinary function  Description  INTERVENTIONS:  - Assess urinary function  - Encourage oral fluids to ensure adequate hydration if ordered  - Administer IV fluids as ordered to ensure adequate hydration  - Administer ordered medications as needed  - Offer frequent toileting  - Follow urinary retention protocol if ordered  Outcome: Progressing  Goal: Absence of urinary retention  Description  INTERVENTIONS:  - Assess patients ability to void and empty bladder  - Monitor I/O  - Bladder scan as needed  - Discuss with physician/AP medications to alleviate retention as needed  - Discuss catheterization for long term situations as appropriate  Outcome: Progressing     Problem: METABOLIC, FLUID AND ELECTROLYTES - ADULT  Goal: Glucose maintained within target range  Description  INTERVENTIONS:  - Monitor Blood Glucose as ordered  - Assess for signs and symptoms of hyperglycemia and hypoglycemia  - Administer ordered medications to maintain glucose within target range  - Assess nutritional intake and initiate nutrition service referral as needed  Outcome: Progressing     Problem: SKIN/TISSUE INTEGRITY - ADULT  Goal: Skin integrity remains intact  Description  INTERVENTIONS  - Identify patients at risk for skin breakdown  - Assess and monitor skin integrity  - Assess and monitor nutrition and hydration status  - Monitor labs (i e  albumin)  - Assess for incontinence   - Turn and reposition patient  - Assist with mobility/ambulation  - Relieve pressure over bony prominences  - Avoid friction and shearing  - Provide appropriate hygiene as needed including keeping skin clean and dry  - Evaluate need for skin moisturizer/barrier cream  - Collaborate with interdisciplinary team (i e  Nutrition, Rehabilitation, etc )   - Patient/family teaching  Outcome: Progressing     Problem: MUSCULOSKELETAL - ADULT  Goal: Maintain or return mobility to safest level of function  Description  INTERVENTIONS:  - Assess patient's ability to carry out ADLs; assess patient's baseline for ADL function and identify physical deficits which impact ability to perform ADLs (bathing, care of mouth/teeth, toileting, grooming, dressing, etc )  - Assess/evaluate cause of self-care deficits   - Assess range of motion  - Assess patient's mobility  - Assess patient's need for assistive devices and provide as appropriate  - Encourage maximum independence but intervene and supervise when necessary  - Involve family in performance of ADLs  - Assess for home care needs following discharge   - Consider OT consult to assist with ADL evaluation and planning for discharge  - Provide patient education as appropriate  Outcome: Progressing     Problem: DISCHARGE PLANNING - CARE MANAGEMENT  Goal: Discharge to post-acute care or home with appropriate resources  Description  INTERVENTIONS:  - Conduct assessment to determine patient/family and health care team treatment goals, and need for post-acute services based on payer coverage, community resources, and patient preferences, and barriers to discharge  - Address psychosocial, clinical, and financial barriers to discharge as identified in assessment in conjunction with the patient/family and health care team  - Arrange appropriate level of post-acute services according to patient's   needs and preference and payer coverage in collaboration with the physician and health care team  - Communicate with and update the patient/family, physician, and health care team regarding progress on the discharge plan  - Arrange appropriate transportation to post-acute venues  Pt will go home with [facility] who will provide transportation     Outcome: Progressing

## 2019-09-26 NOTE — PLAN OF CARE
Problem: PHYSICAL THERAPY ADULT  Goal: Performs mobility at highest level of function for planned discharge setting  See evaluation for individualized goals  Description  Treatment/Interventions: Functional transfer training, LE strengthening/ROM, Therapeutic exercise, Endurance training, Bed mobility, Gait training, Continued evaluation, Spoke to nursing, OT     See flowsheet documentation for full assessment, interventions and recommendations  Carlos Best PT     Outcome: Not Progressing  Note:   Prognosis: Guarded  Problem List: Decreased strength, Decreased endurance, Impaired balance, Decreased mobility, Decreased coordination, Decreased cognition, Impaired judgement, Decreased safety awareness  Assessment: Pt  found resting in bed but agreeable to participate with PT treatment  Pt  performed ther ex on BLE x 20 reps as per treatment flow sheet with assistance to move BLE through full ROM and for proper technique  Pt   denied pain throughout treatment  Pt  then rolled to his R side with mod Ax2 with verbal cues and assistance with LE management  Pt  required mod Ax2 for transfers from supine to sit  Pt 's sitting balance was poor and required mod Ax1 to maintain sitting balance  Pt  sat for about 1 min and then stated "no more"  Pt  then assisted back into bed with mod Ax2 and positioned for comfort with all needs in reach  Pt  was unable to answer why he want to stop treatment, but did not seem to be in any distress  Pt  left with all needs in reach and SCD's applied  Continue current POC and progress as able  Recommendation: Defer at this time     PT - OK to Discharge: No    See flowsheet documentation for full assessment        Birdie Alves, PTA

## 2019-09-26 NOTE — PROGRESS NOTES
Progress Note Khloe Whitlock 1965, 48 y o  male MRN: [id number]    Unit/Bed#: -01 Encounter: [id number]    Primary Care Provider: HUNTER Martinez   Date and time admitted to hospital: 9/24/2019 12:01 PM        * Sepsis due to urinary tract infection [facility])  Assessment & Plan  · As evidence by elevated white blood cell count of 12 90, hyponatremia, tachycardia, acute kidney injury  · Patient also with kidney stones  · Continue IV Rocephin  · Continue IV fluids  · Significant improvement, white count returned to normal, hyponatremia improving    ADRIANA (acute kidney injury) ([facility] )  Assessment & Plan  · Unclear etiology possibly due to dehydration  · Check urine studies-pending  · Check renal ultrasound - no hydronephrosis, positive left renal calculi  · Follow-up CT abdomen pelvis - bilateral renal stones question pyelonephritis, extensive fecal material   · Consult Nephrology - most likely secondary to dehydration due to medications  · Kidney functions are and improving with IV fluids  · IV fluids    Acute cystitis with hematuria  Assessment & Plan  · Present on admission  · Ceftriaxone pending culture results    Toxic metabolic encephalopathy  Assessment & Plan  · Likely due to above-mentioned medical problems  · If no improvement in mental status over the next 24 hours, will consider Neurology evaluation  · Since medications have been discontinued patient is more awake and alert than he had been the day prior  · Neurology did order EEG    Acute dehydration  Assessment & Plan  · Unsure is due to UTI or Kidney stones  · Will continue with IVF  · Monitor labs  Other constipation  Assessment & Plan  · Per CT abdomen and pelvis: Extensive fecal stasis does raise the possibility of fecal impaction should be correlated with any recent bowel movement    · Will continue with IVF  · Patient has had bowel movements    Generalized convulsive epilepsy ([facility] )  Assessment & Plan  · Valproic acid level noted to be elevated, will hold for now  · We will hold Zonegran as well in light of acute kidney injury  · If no improvement in renal function, will consider Neurology consultation for medication adjustments  · Neurology may changes:  Discontinue Depakote, Zonegran, Neurontin and start Vimpat  · Neurology ordered EEG  · There is consideration to decrease Seroquel dose as well  Thrombocytopenia ([address] )  Assessment & Plan  · Appears to be chronic issue for the patient and is followed as an outpatient  · Will hold heparin for now  · Monitor blood counts    Type 2 diabetes mellitus without complication, without long-term current use of insulin [facility])  Assessment & Plan  Lab Results   Component Value Date    HGBA1C 5 8 08/28/2019       Recent Labs     09/25/19  1609 09/25/19  2043 09/26/19  0608 09/26/19  1031   POCGLU 130 145* 123 146*       Blood Sugar Average: Last 72 hrs:  (P) 109 25   · Hold metformin  · Sliding scale insulin    Essential hypertension  Assessment & Plan  · Hold lisinopril in light of acute kidney injury    Other specified hypothyroidism  Assessment & Plan  · Continue Synthroid  · Last TSH from 6 weeks ago was within normal limits      VTE Pharmacologic Prophylaxis: Pharmacologic: SCDs only as patient has thrombocytopenia    Patient Centered Rounds: I have performed bedside rounds with nursing staff today  Discussions with Specialists or Other Care Team Provider:  Nursing, case management, Nephrology, Urology, Neurology  Education and Discussions with Family / Patient:  Discussed with patient and his sister in the room    Current Length of Stay: 2 day(s)    Current Patient Status: Inpatient   Certification Statement: The patient will continue to require additional inpatient hospital stay due to Continue to give IV fluids to help clear patient's acute kidney injury    Discharge Plan:  Back home when appropriate    Code Status: Level 1 - Full Code    Subjective:   Patient is lying in bed    He states that he thinks he had a bowel movement  He also believes that he may be seeing a cat on the floor, when I repositioned the chair and removed the pillow he stated that the CT went away  This could be secondary to some dehydration with acute kidney injury and some toxic metabolic encephalopathy secondary to his home medications  Objective:     Vitals:   Temp (24hrs), Av 9 °F (36 6 °C), Min:97 3 °F (36 3 °C), Max:98 4 °F (36 9 °C)    Temp:  [97 3 °F (36 3 °C)-98 4 °F (36 9 °C)] 97 3 °F (36 3 °C)  HR:  [78-87] 81  Resp:  [16-18] 18  BP: (119-140)/(57-74) 137/74  SpO2:  [90 %-94 %] 90 %  Body mass index is 26 13 kg/m²  Input and Output Summary (last 24 hours): Intake/Output Summary (Last 24 hours) at 2019 1530  Last data filed at 2019 1200  Gross per 24 hour   Intake 1240 ml   Output 1350 ml   Net -110 ml       Physical Exam:     Physical Exam   Constitutional: Vital signs are normal  He appears well-developed and well-nourished  He is cooperative  HENT:   Head: Normocephalic and atraumatic  Nose: Nose normal    Mouth/Throat: Oropharynx is clear and moist and mucous membranes are normal    Eyes: Conjunctivae and EOM are normal    Neck: Normal range of motion and full passive range of motion without pain  Cardiovascular: Normal rate, regular rhythm, normal heart sounds and normal pulses  Pulmonary/Chest: Effort normal and breath sounds normal    Abdominal: Soft  Bowel sounds are normal    Round abdomen   Genitourinary:   Genitourinary Comments: Carrillo catheter with yellow urine   Musculoskeletal:   With PT OT   Neurological: He is alert  Skin: Skin is warm  Psychiatric: He has a normal mood and affect  His speech is normal and behavior is normal    Does not talk much to me         Additional Data:     Labs:    Results from last 7 days   Lab Units 19  0448   WBC Thousand/uL 5 70   HEMOGLOBIN g/dL 11 8*   HEMATOCRIT % 34 9*   PLATELETS Thousands/uL 51*   NEUTROS PCT % 57   LYMPHS PCT % 16*   MONOS PCT % 25*   EOS PCT % 1     Results from last 7 days   Lab Units 09/26/19  0448   POTASSIUM mmol/L 4 2   CHLORIDE mmol/L 102   CO2 mmol/L 22   BUN mg/dL 80*   CREATININE mg/dL 2 49*   CALCIUM mg/dL 8 4*   ALK PHOS U/L 50   ALT U/L 14   AST U/L 22     Results from last 7 days   Lab Units 09/24/19  1302   INR  1 15     Results from last 7 days   Lab Units 09/26/19  1031 09/26/19  0608 09/25/19  2043 09/25/19  1609 09/25/19  1101 09/25/19  0631 09/24/19  2019 09/24/19  1550   POC GLUCOSE mg/dl 146* 123 145* 130 75 83 91 81           * I Have Reviewed All Lab Data Listed Above  * Additional Pertinent Lab Tests Reviewed:  [facility] Admission  Reviewed    Imaging:  Imaging Reports Reviewed Today Include:  None    Recent Cultures (last 7 days):           Last 24 Hours Medication List:     Current Facility-Administered Medications:  acetaminophen 650 mg Oral Q6H PRN Paolo Brennan MD    benztropine 0 5 mg Oral BID Paolo Brennan MD    bisacodyl 10 mg Rectal Daily Paolo Brennan MD    cefTRIAXone 1,000 mg Intravenous Q24H Paolo Brennan MD Last Rate: 1,000 mg (09/26/19 1418)   docusate sodium 100 mg Oral BID Paolo Brennan MD    hydrOXYzine HCL 25 mg Oral TID Paolo Brennan MD    insulin lispro 1-5 Units Subcutaneous HS Paolo Brennan MD    insulin lispro 1-6 Units Subcutaneous TID [facility] Paolo Brennan MD    lacosamide (VIMPAT) IVPB 150 mg Intravenous Q12H Juanjo Haro PA-C Last Rate: 150 mg (09/26/19 3640)   levothyroxine 100 mcg Oral Early Morning Juanjo Haro PA-C    ondansetron 4 mg Intravenous Once Paolo Brennan MD    QUEtiapine 300 mg Oral HS Anna Hernandez MD    And        QUEtiapine 100 mg Oral HS Anna Hernandez MD    QUEtiapine 150 mg Oral BID Anna Hernandez MD    And        QUEtiapine 50 mg Oral BID Anna Hernandez MD    senna 2 tablet Oral Daily PRN Paolo Brennan MD    sodium chloride 100 mL/hr Intravenous Continuous Paolo Brennan MD Last Rate: 100 mL/hr (09/26/19 0357)        Today, Patient Was Seen By: HUNTER Sellers    ** Please Note: Dictation voice to text software may have been used in the creation of this document   **

## 2019-09-26 NOTE — ASSESSMENT & PLAN NOTE
· Per CT abdomen and pelvis: Extensive fecal stasis does raise the possibility of fecal impaction should be correlated with any recent bowel movement    · Will continue with IVF  · Patient has had bowel movements

## 2019-09-26 NOTE — PLAN OF CARE
Problem: Prexisting or High Potential for Compromised Skin Integrity  Goal: Skin integrity is maintained or improved  Description  INTERVENTIONS:  - Identify patients at risk for skin breakdown  - Assess and monitor skin integrity  - Assess and monitor nutrition and hydration status  - Monitor labs   - Assess for incontinence   - Turn and reposition patient  - Assist with mobility/ambulation  - Relieve pressure over bony prominences  - Avoid friction and shearing  - Provide appropriate hygiene as needed including keeping skin clean and dry  - Evaluate need for skin moisturizer/barrier cream  - Collaborate with interdisciplinary team   - Patient/family teaching  - Consider wound care consult   Outcome: Progressing     Problem: Potential for Falls  Goal: Patient will remain free of falls  Description  INTERVENTIONS:  - Assess patient frequently for physical needs  -  Identify cognitive and physical deficits and behaviors that affect risk of falls    -  [place] fall precautions as indicated by assessment   - Educate patient/family on patient safety including physical limitations  - Instruct patient to call for assistance with activity based on assessment  - Modify environment to reduce risk of injury  - Consider OT/PT consult to assist with strengthening/mobility  Outcome: Progressing     Problem: PAIN - ADULT  Goal: Verbalizes/displays adequate comfort level or baseline comfort level  Description  Interventions:  - Encourage patient to monitor pain and request assistance  - Assess pain using appropriate pain scale  - Administer analgesics based on type and severity of pain and evaluate response  - Implement non-pharmacological measures as appropriate and evaluate response  - Consider cultural and social influences on pain and pain management  - Notify physician/advanced practitioner if interventions unsuccessful or patient reports new pain  Outcome: Progressing     Problem: INFECTION - ADULT  Goal: Absence or prevention of progression during hospitalization  Description  INTERVENTIONS:  - Assess and monitor for signs and symptoms of infection  - Monitor lab/diagnostic results  - Monitor all insertion sites, i e  indwelling lines, tubes, and drains  - Monitor endotracheal if appropriate and nasal secretions for changes in amount and color  - [place] appropriate cooling/warming therapies per order  - Administer medications as ordered  - Instruct and encourage patient and family to use good hand hygiene technique  - Identify and instruct in appropriate isolation precautions for identified infection/condition  Outcome: Progressing     Problem: SAFETY ADULT  Goal: Maintain or return to baseline ADL function  Description  INTERVENTIONS:  -  Assess patient's ability to carry out ADLs; assess patient's baseline for ADL function and identify physical deficits which impact ability to perform ADLs (bathing, care of mouth/teeth, toileting, grooming, dressing, etc )  - Assess/evaluate cause of self-care deficits   - Assess range of motion  - Assess patient's mobility; develop plan if impaired  - Assess patient's need for assistive devices and provide as appropriate  - Encourage maximum independence but intervene and supervise when necessary  - Involve family in performance of ADLs  - Assess for home care needs following discharge   - Consider OT consult to assist with ADL evaluation and planning for discharge  - Provide patient education as appropriate  Outcome: Progressing  Goal: Maintain or return mobility status to optimal level  Description  INTERVENTIONS:  - Assess patient's baseline mobility status (ambulation, transfers, stairs, etc )    - Identify cognitive and physical deficits and behaviors that affect mobility  - Identify mobility aids required to assist with transfers and/or ambulation (gait belt, sit-to-stand, lift, walker, cane, etc )  - [place] fall precautions as indicated by assessment  - Record patient progress and toleration of activity level on Mobility SBAR; progress patient to next Phase/Stage  - Instruct patient to call for assistance with activity based on assessment  - Consider rehabilitation consult to assist with strengthening/weightbearing, etc   Outcome: Progressing     Problem: DISCHARGE PLANNING  Goal: Discharge to home or other facility with appropriate resources  Description  INTERVENTIONS:  - Identify barriers to discharge w/patient and caregiver  - Arrange for needed discharge resources and transportation as appropriate  - Identify discharge learning needs (meds, wound care, etc )  - Arrange for interpretive services to assist at discharge as needed  - Refer to Case Management Department for coordinating discharge planning if the patient needs post-hospital services based on physician/advanced practitioner order or complex needs related to functional status, cognitive ability, or social support system  Outcome: Progressing     Problem: Knowledge Deficit  Goal: Patient/family/caregiver demonstrates understanding of disease process, treatment plan, medications, and discharge instructions  Description  Complete learning assessment and assess knowledge base    Interventions:  - Provide teaching at level of understanding  - Provide teaching via preferred learning methods  Outcome: Progressing     Problem: NEUROSENSORY - ADULT  Goal: Remains free of injury related to seizures activity  Description  INTERVENTIONS  - Maintain airway, patient safety  and administer oxygen as ordered  - Monitor patient for seizure activity, document and report duration and description of seizure to physician/advanced practitioner  - If seizure occurs,  ensure patient safety during seizure  - Reorient patient post seizure  - Seizure pads on all 4 side rails  - Instruct patient/family to notify RN of any seizure activity including if an aura is experienced  - Instruct patient/family to call for assistance with activity based on nursing assessment  - Administer anti-seizure medications if ordered    Outcome: Progressing     Problem: GASTROINTESTINAL - ADULT  Goal: Minimal or absence of nausea and/or vomiting  Description  INTERVENTIONS:  - Administer IV fluids if ordered to ensure adequate hydration  - Maintain NPO status until nausea and vomiting are resolved  - Nasogastric tube if ordered  - Administer ordered antiemetic medications as needed  - Provide nonpharmacologic comfort measures as appropriate  - Advance diet as tolerated, if ordered  - Consider nutrition services referral to assist patient with adequate nutrition and appropriate food choices  Outcome: Progressing  Goal: Maintains or returns to baseline bowel function  Description  INTERVENTIONS:  - Assess bowel function  - Encourage oral fluids to ensure adequate hydration  - Administer IV fluids if ordered to ensure adequate hydration  - Administer ordered medications as needed  - Encourage mobilization and activity  - Consider nutritional services referral to assist patient with adequate nutrition and appropriate food choices  Outcome: Progressing  Goal: Maintains adequate nutritional intake  Description  INTERVENTIONS:  - Monitor percentage of each meal consumed  - Identify factors contributing to decreased intake, treat as appropriate  - Assist with meals as needed  - Monitor I&O, weight, and lab values if indicated  - Obtain nutrition services referral as needed  Outcome: Progressing     Problem: GENITOURINARY - ADULT  Goal: Maintains or returns to baseline urinary function  Description  INTERVENTIONS:  - Assess urinary function  - Encourage oral fluids to ensure adequate hydration if ordered  - Administer IV fluids as ordered to ensure adequate hydration  - Administer ordered medications as needed  - Offer frequent toileting  - Follow urinary retention protocol if ordered  Outcome: Progressing  Goal: Absence of urinary retention  Description  INTERVENTIONS:  - Assess patients ability to void and empty bladder  - Monitor I/O  - Bladder scan as needed  - Discuss with physician/AP medications to alleviate retention as needed  - Discuss catheterization for long term situations as appropriate  Outcome: Progressing     Problem: METABOLIC, FLUID AND ELECTROLYTES - ADULT  Goal: Glucose maintained within target range  Description  INTERVENTIONS:  - Monitor Blood Glucose as ordered  - Assess for signs and symptoms of hyperglycemia and hypoglycemia  - Administer ordered medications to maintain glucose within target range  - Assess nutritional intake and initiate nutrition service referral as needed  Outcome: Progressing     Problem: SKIN/TISSUE INTEGRITY - ADULT  Goal: Skin integrity remains intact  Description  INTERVENTIONS  - Identify patients at risk for skin breakdown  - Assess and monitor skin integrity  - Assess and monitor nutrition and hydration status  - Monitor labs (i e  albumin)  - Assess for incontinence   - Turn and reposition patient  - Assist with mobility/ambulation  - Relieve pressure over bony prominences  - Avoid friction and shearing  - Provide appropriate hygiene as needed including keeping skin clean and dry  - Evaluate need for skin moisturizer/barrier cream  - Collaborate with interdisciplinary team (i e  Nutrition, Rehabilitation, etc )   - Patient/family teaching  Outcome: Progressing     Problem: MUSCULOSKELETAL - ADULT  Goal: Maintain or return mobility to safest level of function  Description  INTERVENTIONS:  - Assess patient's ability to carry out ADLs; assess patient's baseline for ADL function and identify physical deficits which impact ability to perform ADLs (bathing, care of mouth/teeth, toileting, grooming, dressing, etc )  - Assess/evaluate cause of self-care deficits   - Assess range of motion  - Assess patient's mobility  - Assess patient's need for assistive devices and provide as appropriate  - Encourage maximum independence but intervene and supervise when necessary  - Involve family in performance of ADLs  - Assess for home care needs following discharge   - Consider OT consult to assist with ADL evaluation and planning for discharge  - Provide patient education as appropriate  Outcome: Progressing     Problem: DISCHARGE PLANNING - CARE MANAGEMENT  Goal: Discharge to post-acute care or home with appropriate resources  Description  INTERVENTIONS:  - Conduct assessment to determine patient/family and health care team treatment goals, and need for post-acute services based on payer coverage, community resources, and patient preferences, and barriers to discharge  - Address psychosocial, clinical, and financial barriers to discharge as identified in assessment in conjunction with the patient/family and health care team  - Arrange appropriate level of post-acute services according to patient's   needs and preference and payer coverage in collaboration with the physician and health care team  - Communicate with and update the patient/family, physician, and health care team regarding progress on the discharge plan  - Arrange appropriate transportation to post-acute venues  Pt will go home with [facility] who will provide transportation     Outcome: Progressing

## 2019-09-26 NOTE — PROGRESS NOTES
Progress Note - Nephrology   Titi Oconnell 48 y o  male MRN: [id number]  Unit/Bed#: -01 Encounter: [id number]    A/P:  1  Acute kidney injury: due to volume depletion  His fractional excretion of sodium is low at 0 7% and he has responded to IVF with a drop in creatinine to 2 49 from 3 89 yesterday  Would continue volume restoration  2  Hypertension: stable  3  Hyponatremia: he was on meds that can cause an SIADH type picture, however, would volume restore  His sodium is improving slightly  4  Type 2 DM  Without long term use of insulin: sugars being monitored  5  Toxic metabolic encephalopathy: clearing mental status  Neurology has changed his antiepileptic medications      Follow up reason for today's visit: Acute kidney injury    Sepsis due to urinary tract infection [facility])    Patient Active Problem List   Diagnosis    Cataract    Cerebral palsy ([facility] )    Type 2 diabetes mellitus without complication, without long-term current use of insulin (HCC)    Generalized convulsive epilepsy ([facility] )    Hyperlipidemia    Essential hypertension    Other specified hypothyroidism    Osteoporosis    Scoliosis    Vitamin B12 deficiency    Vitamin D deficiency    Screening for colon cancer    Seborrheic dermatitis of scalp    Headache    Nearsightedness    Behavior concern in adult    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HCC)    Thrombocytopenia (HCC)    [facility]    Acute cystitis with hematuria    Toxic metabolic encephalopathy    Sepsis due to urinary tract infection (HCC)    Other constipation    Acute dehydration         Subjective:   Denies chest pain, dizziness, dyspnea, has lower abdominal discomfort, no dysuria  He is able to answer pointed questions today but still has a flat affect and seems tired      Objective:     Vitals: Blood pressure 140/69, pulse 80, temperature 98 °F (36 7 °C), temperature source Tympanic, resp   rate 16, height 5' 10" (1 778 m), weight 82 6 kg (182 lb 1 6 oz), SpO2 94 %  ,Body mass index is 26 13 kg/m²  Weight (last 2 days)     Date/Time   Weight    09/26/19 0600   82 6 (182 1)    09/25/19 1451   83 2 (183 42)    09/24/19 1546   79 6 (175 49) Weigh bed    Weight: Weigh bed at 09/24/19 1546    09/24/19 1205   80 (176 37)                Intake/Output Summary (Last 24 hours) at 9/26/2019 0916  Last data filed at 9/26/2019 0516  Gross per 24 hour   Intake 1100 ml   Output 1150 ml   Net -50 ml     I/O last 3 completed shifts: In: 1100 [P O :100; I V :1000]  Out: 1750 [Urine:1750]    Urethral Catheter 16 Fr   (Active)   Reasons to continue Urinary Catheter  Acute urinary retention/obstruction failing urinary retention protocol 9/26/2019  8:21 AM   Goal for Removal N/A- discharging with chapman 9/26/2019  8:21 AM   Site Assessment Clean;Skin intact 9/26/2019  8:21 AM   Collection Container Standard drainage bag 9/26/2019  8:21 AM   Securement Method Securing device (Describe) 9/26/2019  8:21 AM   Output (mL) 1150 mL 9/26/2019  5:16 AM       Physical Exam: /69 (BP Location: Left arm)   Pulse 80   Temp 98 °F (36 7 °C) (Tympanic)   Resp 16   Ht 5' 10" (1 778 m)   Wt 82 6 kg (182 lb 1 6 oz)   SpO2 94%   BMI 26 13 kg/m²     General Appearance:    More awake and responsive , cooperative, no distress, appears stated age   Head:    Normocephalic, without obvious abnormality, atraumatic   Eyes:    Conjunctiva/corneas clear   Ears:    Normal external ears   Nose:   Nares normal, septum midline, mucosa normal, no drainage    or sinus tenderness   Throat:   Lips, mucosa, and tongue normal; teeth and gums normal   Neck:   Supple, symmetrical, trachea midline, no adenopathy;        thyroid:  No enlargement/tenderness/nodules; no carotid    bruit or JVD   Back:     Symmetric, no curvature, ROM normal, no CVA tenderness   Lungs:     Clear to auscultation bilaterally, respirations unlabored   Chest wall:    No tenderness or deformity   Heart:    Regular rate and rhythm, S1 and S2 normal, no murmur, rub   or gallop   Abdomen:     Soft, non-tender, bowel sounds active   Extremities:   Extremities normal, atraumatic, no cyanosis or edema   Skin:   Skin color, texture, turgor normal, no rashes or lesions   Lymph nodes:   Cervical normal   Neurologic:   CNII-XII intact            Lab, Imaging and other studies: I have personally reviewed pertinent labs  CBC:   Lab Results   Component Value Date    WBC 5 70 09/26/2019    HGB 11 8 (L) 09/26/2019    HCT 34 9 (L) 09/26/2019    MCV 99 09/26/2019    PLT 51 (L) 09/26/2019    MCH 33 8 09/26/2019    MCHC 34 0 09/26/2019    RDW 15 1 (H) 09/26/2019    MPV 10 6 09/26/2019     CMP:   Lab Results   Component Value Date    K 4 2 09/26/2019     09/26/2019    CO2 22 09/26/2019    BUN 80 (H) 09/26/2019    CREATININE 2 49 (H) 09/26/2019    CALCIUM 8 4 (L) 09/26/2019    AST 22 09/26/2019    ALT 14 09/26/2019    ALKPHOS 50 09/26/2019    EGFR 29 09/26/2019         Results from last 7 days   Lab Units 09/26/19  0448 09/25/19  0525 09/24/19  1302   POTASSIUM mmol/L 4 2 4 0 4 1   CHLORIDE mmol/L 102 98 95*   CO2 mmol/L 22 22 25   BUN mg/dL 80* 81* 70*   CREATININE mg/dL 2 49* 3 89* 3 91*   CALCIUM mg/dL 8 4* 8 4* 8 3*   ALK PHOS U/L 50 38* 45   ALT U/L 14 9 10   AST U/L 22 17 23         Phosphorus: No results found for: PHOS  Magnesium: No results found for: MG  Urinalysis: No results found for: COLORU, CLARITYU, SPECGRAV, PHUR, LEUKOCYTESUR, NITRITE, PROTEINUA, GLUCOSEU, KETONESU, BILIRUBINUR, BLOODU  Ionized Calcium: No results found for: CAION  Coagulation: No results found for: PT, INR, APTT  Troponin: No results found for: TROPONINI  ABG: No results found for: PHART, GNN9KUZ, PO2ART, CFG7NZS, K3QCUEXT, BEART, SOURCE  Radiology review:     IMAGING  Procedure: Ct Abdomen Pelvis Wo Contrast    Result Date: 9/24/2019  Narrative: CT ABDOMEN AND PELVIS WITHOUT IV CONTRAST INDICATION:   renal failure    Unable to urinate COMPARISON:  Study from 4/15/2009 TECHNIQUE:  CT examination of the abdomen and pelvis was performed without intravenous contrast   Axial, sagittal, and coronal 2D reformatted images were created from the source data and submitted for interpretation  Radiation dose length product (DLP) for this visit:  570 5 mGy-cm   This examination, like all CT scans performed in the [facility], was performed utilizing techniques to minimize radiation dose exposure, including the use of iterative reconstruction and automated exposure control  Enteric contrast was not administered  FINDINGS: ABDOMEN LOWER CHEST:  Patchy areas of airspace opacities probably related to dependent change  Small infiltrate may be present at the right lung base  LIVER/BILIARY TREE:  Unremarkable  GALLBLADDER:  No calcified gallstones  No pericholecystic inflammatory change  SPLEEN:  Unremarkable  PANCREAS:  Unremarkable  ADRENAL GLANDS:  Unremarkable  KIDNEYS/URETERS:  Nonobstructing calculi are present within the right kidney  The left kidney demonstrates slightly larger nonobstructing calculi including 6 mm in the upper pole left kidney  Smaller calculi are seen in the lower pole  There is no hydronephrosis present or hydroureter  Perinephric stranding is present bilaterally which is symmetric but new since the prior study  STOMACH AND BOWEL:  There is significant fecal stasis seen throughout the course: With distention of the rectum raising the possibility of developing fecal impaction  These findings are new since the prior study  APPENDIX:  The appendix is not well seen  Surgical clips are seen near the cecum  ABDOMINOPELVIC CAVITY:  No ascites or free intraperitoneal air  No lymphadenopathy  VESSELS:  Unremarkable for patient's age  PELVIS REPRODUCTIVE ORGANS:  Unremarkable for patient's age  URINARY BLADDER:  Unremarkable  The prostate is not enlarged and measures 4 cm transversely  ABDOMINAL WALL/INGUINAL REGIONS:  Unremarkable   OSSEOUS STRUCTURES: Well-corticated ossific enlargement of the left iliac crest probably related to prior trauma unchanged since the prior study  Serpiginous sclerotic densities of the femoral heads is probably related to avascular necrosis and similar to the prior study  Impression: Bilateral renal calculi without hydronephrosis or hydroureter  There is no bladder dilatation  Perinephric stranding is seen bilaterally and is nonspecific though occasionally pyelonephritis can present this way  Extensive fecal stasis does raise the possibility of fecal impaction should be correlated with any recent bowel movement  No obvious bowel wall thickening or inflammatory changes elsewhere  Immediate finding was noted in the Epic system  Workstation performed: [id number]     Procedure: Xr Chest 1 View Portable    Result Date: 9/24/2019  Narrative: CHEST INDICATION:   AMS  COMPARISON:  April 24, 2009 EXAM PERFORMED/VIEWS:  XR CHEST PORTABLE AP semierect chest x-ray FINDINGS:  Diminished lung volumes  Elevation right hemidiaphragm  Atelectatic changes both lung bases  Cardiac size within normal limits  No vascular congestion or peribronchial thickening  No pneumothorax or free air  Osseous structures appear within normal limits for patient age  Impression: Diminished lung volumes  Bibasilar atelectasis  No lobar consolidation or large effusion  Workstation performed: [id number]     Procedure: Ct Head Without Contrast    Result Date: 9/24/2019  Narrative: CT BRAIN - WITHOUT CONTRAST INDICATION:   Altered mental status  COMPARISON:  None  TECHNIQUE:  CT examination of the brain was performed  In addition to axial images, coronal 2D reformatted images were created and submitted for interpretation  Radiation dose length product (DLP) for this visit:  852 1 mGy-cm     This examination, like all CT scans performed in the [facility], was performed utilizing techniques to minimize radiation dose exposure, including the use of iterative reconstruction and automated exposure control  IMAGE QUALITY:  Diagnostic  FINDINGS: PARENCHYMA: Decreased attenuation is noted in periventricular and subcortical white matter demonstrating an appearance that is statistically most likely to represent mild microangiopathic change  Disproportionate cerebellar atrophy is identified  No CT signs of acute infarction  No intracranial mass, mass effect or midline shift  No acute parenchymal hemorrhage  VENTRICLES AND EXTRA-AXIAL SPACES:  Normal for the patient's age  VISUALIZED ORBITS AND PARANASAL SINUSES:  Unremarkable  CALVARIUM AND EXTRACRANIAL SOFT TISSUES:  Normal      Impression: No acute intracranial abnormality  Precocious volume loss disproportionate within the cerebellum  Workstation performed: [id number]     Procedure: Us Kidney And Bladder    Result Date: 9/25/2019  Narrative: RENAL ULTRASOUND INDICATION:   RENAL FAILURE, CHRONIC   Addi Pipe COMPARISON: CT scan 9/24/2019  TECHNIQUE:   Ultrasound of the retroperitoneum was performed with a curvilinear transducer utilizing volumetric sweeps and still imaging techniques  FINDINGS: KIDNEYS: Symmetric and normal size  Right kidney:  14 6 cm  Left kidney:  14 6 cm  Right kidney Normal echogenicity and contour  No suspicious masses detected  No hydronephrosis  Tiny calculus seen on the CT scan is not appreciated on this study  No perinephric fluid collections  Left kidney Normal echogenicity and contour  No suspicious masses detected  No hydronephrosis  Multiple calculi scattered throughout the kidney, largest at the upper pole measuring 9 mm  No perinephric fluid collections  URETERS: Nonvisualized  BLADDER: Normally distended  No focal thickening or mass lesions  Bilateral ureteral jets detected  Impression: No hydronephrosis  Multiple left renal calculi   Workstation performed: [id number]       Current Facility-Administered Medications   Medication Dose Route Frequency    acetaminophen (TYLENOL) tablet 650 mg  650 mg Oral Q6H PRN    benztropine (COGENTIN) tablet 0 5 mg  0 5 mg Oral BID    bisacodyl (DULCOLAX) rectal suppository 10 mg  10 mg Rectal Daily    cefTRIAXone (ROCEPHIN) IVPB (premix) 1,000 mg  1,000 mg Intravenous Q24H    docusate sodium (COLACE) capsule 100 mg  100 mg Oral BID    hydrOXYzine HCL (ATARAX) tablet 25 mg  25 mg Oral TID    insulin lispro (HumaLOG) 100 units/mL subcutaneous injection 1-5 Units  1-5 Units Subcutaneous HS    insulin lispro (HumaLOG) 100 units/mL subcutaneous injection 1-6 Units  1-6 Units Subcutaneous TID AC    lacosamide (VIMPAT) 150 mg in sodium chloride 0 9 % 100 mL IVPB  150 mg Intravenous Q12H    levothyroxine tablet 100 mcg  100 mcg Oral Early Morning    ondansetron (ZOFRAN) injection 4 mg  4 mg Intravenous Once    QUEtiapine (SEROquel XR) 24 hr tablet 300 mg  300 mg Oral HS    And    QUEtiapine (SEROquel XR) 24 hr tablet 100 mg  100 mg Oral HS    QUEtiapine (SEROquel XR) 24 hr tablet 150 mg  150 mg Oral BID    And    QUEtiapine (SEROquel XR) 24 hr tablet 50 mg  50 mg Oral BID    senna (SENOKOT) tablet 17 2 mg  2 tablet Oral Daily PRN    sodium chloride 0 9 % infusion  100 mL/hr Intravenous Continuous     Medications Discontinued During This Encounter   Medication Reason    QUEtiapine (SEROquel XR) 24 hr tablet 400 mg     QUEtiapine (SEROquel XR) 400 mg     QUEtiapine (SEROquel XR) 24 hr tablet 200 mg     magnesium sulfate IVPB (premix) SOLN 1 g     levothyroxine tablet 125 mcg     divalproex sodium (DEPAKOTE ER) 24 hr tablet 500 mg     divalproex sodium (DEPAKOTE ER) 24 hr tablet 500 mg     gabapentin (NEURONTIN) capsule 100 mg     lacosamide (VIMPAT) 150 mg in sodium chloride 0 9 % 100 mL Arleth Ching MD

## 2019-09-27 ENCOUNTER — APPOINTMENT (INPATIENT)
Dept: RADIOLOGY | Facility: HOSPITAL | Age: 54
DRG: 871 | End: 2019-09-27
Payer: MEDICARE

## 2019-09-27 LAB
ALBUMIN SERPL BCP-MCNC: 3 G/DL (ref 3.5–5.7)
ALP SERPL-CCNC: 62 U/L (ref 40–150)
ALT SERPL W P-5'-P-CCNC: 18 U/L (ref 7–52)
ANION GAP SERPL CALCULATED.3IONS-SCNC: 8 MMOL/L (ref 4–13)
AST SERPL W P-5'-P-CCNC: 29 U/L (ref 13–39)
B2 MICROGLOB UR-MCNC: 14 UG/L (ref 0–300)
BACTERIA UR CULT: NORMAL
BILIRUB SERPL-MCNC: 0.3 MG/DL (ref 0.2–1)
BUN SERPL-MCNC: 55 MG/DL (ref 7–25)
CALCIUM SERPL-MCNC: 8.8 MG/DL (ref 8.6–10.5)
CHLORIDE SERPL-SCNC: 107 MMOL/L (ref 98–107)
CO2 SERPL-SCNC: 22 MMOL/L (ref 21–31)
CREAT SERPL-MCNC: 1.36 MG/DL (ref 0.7–1.3)
ERYTHROCYTE [DISTWIDTH] IN BLOOD BY AUTOMATED COUNT: 15 % (ref 11.5–14.5)
GFR SERPL CREATININE-BSD FRML MDRD: 59 ML/MIN/1.73SQ M
GIANT PLATELETS BLD QL SMEAR: PRESENT
GLUCOSE SERPL-MCNC: 147 MG/DL (ref 65–99)
GLUCOSE SERPL-MCNC: 149 MG/DL (ref 65–140)
GLUCOSE SERPL-MCNC: 151 MG/DL (ref 65–140)
GLUCOSE SERPL-MCNC: 180 MG/DL (ref 65–140)
GLUCOSE SERPL-MCNC: 90 MG/DL (ref 65–140)
HCT VFR BLD AUTO: 33.2 % (ref 42–47)
HGB BLD-MCNC: 11.4 G/DL (ref 14–18)
LYMPHOCYTES # BLD AUTO: 0.99 THOUSAND/UL (ref 0.6–4.47)
LYMPHOCYTES # BLD AUTO: 16 % (ref 20–51)
MACROCYTES BLD QL AUTO: PRESENT
MCH RBC QN AUTO: 34.2 PG (ref 26–34)
MCHC RBC AUTO-ENTMCNC: 34.4 G/DL (ref 31–37)
MCV RBC AUTO: 99 FL (ref 81–99)
MONOCYTES # BLD AUTO: 1.8 THOUSAND/UL (ref 0–1.22)
MONOCYTES NFR BLD AUTO: 29 % (ref 4–12)
NEUTS BAND NFR BLD MANUAL: 7 % (ref 0–8)
NEUTS SEG # BLD: 3.41 THOUSAND/UL (ref 1.81–6.82)
NEUTS SEG NFR BLD AUTO: 48 % (ref 42–75)
PLATELET # BLD AUTO: 59 THOUSANDS/UL (ref 149–390)
PLATELET BLD QL SMEAR: ABNORMAL
PMV BLD AUTO: 11.5 FL (ref 8.6–11.7)
POTASSIUM SERPL-SCNC: 4.8 MMOL/L (ref 3.5–5.5)
PROT SERPL-MCNC: 6 G/DL (ref 6.4–8.9)
RBC # BLD AUTO: 3.35 MILLION/UL (ref 4.3–5.9)
RBC MORPH BLD: PRESENT
SODIUM SERPL-SCNC: 137 MMOL/L (ref 134–143)
TOTAL CELLS COUNTED SPEC: 100
WBC # BLD AUTO: 6.2 THOUSAND/UL (ref 4.8–10.8)

## 2019-09-27 PROCEDURE — 85027 COMPLETE CBC AUTOMATED: CPT | Performed by: NURSE PRACTITIONER

## 2019-09-27 PROCEDURE — 97530 THERAPEUTIC ACTIVITIES: CPT

## 2019-09-27 PROCEDURE — 82948 REAGENT STRIP/BLOOD GLUCOSE: CPT

## 2019-09-27 PROCEDURE — 99232 SBSQ HOSP IP/OBS MODERATE 35: CPT | Performed by: INTERNAL MEDICINE

## 2019-09-27 PROCEDURE — 85007 BL SMEAR W/DIFF WBC COUNT: CPT | Performed by: NURSE PRACTITIONER

## 2019-09-27 PROCEDURE — 99232 SBSQ HOSP IP/OBS MODERATE 35: CPT | Performed by: NURSE PRACTITIONER

## 2019-09-27 PROCEDURE — 97112 NEUROMUSCULAR REEDUCATION: CPT

## 2019-09-27 PROCEDURE — 74018 RADEX ABDOMEN 1 VIEW: CPT

## 2019-09-27 PROCEDURE — 80053 COMPREHEN METABOLIC PANEL: CPT | Performed by: NURSE PRACTITIONER

## 2019-09-27 RX ORDER — MINERAL OIL 100 G/100G
1 OIL RECTAL ONCE
Status: DISCONTINUED | OUTPATIENT
Start: 2019-09-27 | End: 2019-09-30 | Stop reason: HOSPADM

## 2019-09-27 RX ORDER — SIMETHICONE 80 MG
80 TABLET,CHEWABLE ORAL EVERY 6 HOURS PRN
Status: DISCONTINUED | OUTPATIENT
Start: 2019-09-27 | End: 2019-09-30 | Stop reason: HOSPADM

## 2019-09-27 RX ORDER — MINERAL OIL 100 G/100G
1 OIL RECTAL ONCE
Status: COMPLETED | OUTPATIENT
Start: 2019-09-27 | End: 2019-09-27

## 2019-09-27 RX ADMIN — LEVOTHYROXINE SODIUM 100 MCG: 100 TABLET ORAL at 05:04

## 2019-09-27 RX ADMIN — DOCUSATE SODIUM 100 MG: 100 CAPSULE, LIQUID FILLED ORAL at 08:05

## 2019-09-27 RX ADMIN — BENZTROPINE MESYLATE 0.5 MG: 1 TABLET ORAL at 17:35

## 2019-09-27 RX ADMIN — QUETIAPINE FUMARATE 150 MG: 50 TABLET, EXTENDED RELEASE ORAL at 08:05

## 2019-09-27 RX ADMIN — INSULIN LISPRO 1 UNITS: 100 INJECTION, SOLUTION INTRAVENOUS; SUBCUTANEOUS at 08:05

## 2019-09-27 RX ADMIN — SODIUM CHLORIDE 150 MG: 9 INJECTION, SOLUTION INTRAVENOUS at 20:32

## 2019-09-27 RX ADMIN — SIMETHICONE CHEW TAB 80 MG 80 MG: 80 TABLET ORAL at 13:23

## 2019-09-27 RX ADMIN — HYDROXYZINE HYDROCHLORIDE 25 MG: 25 TABLET ORAL at 08:05

## 2019-09-27 RX ADMIN — QUETIAPINE FUMARATE 50 MG: 50 TABLET, EXTENDED RELEASE ORAL at 17:34

## 2019-09-27 RX ADMIN — INSULIN LISPRO 1 UNITS: 100 INJECTION, SOLUTION INTRAVENOUS; SUBCUTANEOUS at 17:30

## 2019-09-27 RX ADMIN — SODIUM CHLORIDE 100 ML/HR: 9 INJECTION, SOLUTION INTRAVENOUS at 08:09

## 2019-09-27 RX ADMIN — BISACODYL 10 MG: 10 SUPPOSITORY RECTAL at 17:41

## 2019-09-27 RX ADMIN — SENNOSIDES 17.2 MG: 8.6 TABLET, FILM COATED ORAL at 02:11

## 2019-09-27 RX ADMIN — QUETIAPINE FUMARATE 100 MG: 50 TABLET, EXTENDED RELEASE ORAL at 21:20

## 2019-09-27 RX ADMIN — QUETIAPINE FUMARATE 150 MG: 50 TABLET, EXTENDED RELEASE ORAL at 17:34

## 2019-09-27 RX ADMIN — CEFTRIAXONE 1000 MG: 1 INJECTION, SOLUTION INTRAVENOUS at 17:32

## 2019-09-27 RX ADMIN — QUETIAPINE FUMARATE 300 MG: 150 TABLET, EXTENDED RELEASE ORAL at 21:21

## 2019-09-27 RX ADMIN — QUETIAPINE FUMARATE 50 MG: 50 TABLET, EXTENDED RELEASE ORAL at 08:05

## 2019-09-27 RX ADMIN — HYDROXYZINE HYDROCHLORIDE 25 MG: 25 TABLET ORAL at 21:21

## 2019-09-27 RX ADMIN — MINERAL OIL 1 ENEMA: 118 ENEMA RECTAL at 07:22

## 2019-09-27 RX ADMIN — DOCUSATE SODIUM 100 MG: 100 CAPSULE, LIQUID FILLED ORAL at 17:32

## 2019-09-27 RX ADMIN — SODIUM CHLORIDE 150 MG: 9 INJECTION, SOLUTION INTRAVENOUS at 08:06

## 2019-09-27 RX ADMIN — HYDROXYZINE HYDROCHLORIDE 25 MG: 25 TABLET ORAL at 17:33

## 2019-09-27 RX ADMIN — BENZTROPINE MESYLATE 0.5 MG: 1 TABLET ORAL at 08:06

## 2019-09-27 NOTE — PLAN OF CARE
Problem: Prexisting or High Potential for Compromised Skin Integrity  Goal: Skin integrity is maintained or improved  Description  INTERVENTIONS:  - Identify patients at risk for skin breakdown  - Assess and monitor skin integrity  - Assess and monitor nutrition and hydration status  - Monitor labs   - Assess for incontinence   - Turn and reposition patient  - Assist with mobility/ambulation  - Relieve pressure over bony prominences  - Avoid friction and shearing  - Provide appropriate hygiene as needed including keeping skin clean and dry  - Evaluate need for skin moisturizer/barrier cream  - Collaborate with interdisciplinary team   - Patient/family teaching  - Consider wound care consult   Outcome: Progressing     Problem: Potential for Falls  Goal: Patient will remain free of falls  Description  INTERVENTIONS:  - Assess patient frequently for physical needs  -  Identify cognitive and physical deficits and behaviors that affect risk of falls    -  [place] fall precautions as indicated by assessment   - Educate patient/family on patient safety including physical limitations  - Instruct patient to call for assistance with activity based on assessment  - Modify environment to reduce risk of injury  - Consider OT/PT consult to assist with strengthening/mobility  Outcome: Progressing     Problem: PAIN - ADULT  Goal: Verbalizes/displays adequate comfort level or baseline comfort level  Description  Interventions:  - Encourage patient to monitor pain and request assistance  - Assess pain using appropriate pain scale  - Administer analgesics based on type and severity of pain and evaluate response  - Implement non-pharmacological measures as appropriate and evaluate response  - Consider cultural and social influences on pain and pain management  - Notify physician/advanced practitioner if interventions unsuccessful or patient reports new pain  Outcome: Progressing     Problem: INFECTION - ADULT  Goal: Absence or prevention of progression during hospitalization  Description  INTERVENTIONS:  - Assess and monitor for signs and symptoms of infection  - Monitor lab/diagnostic results  - Monitor all insertion sites, i e  indwelling lines, tubes, and drains  - Monitor endotracheal if appropriate and nasal secretions for changes in amount and color  - [place] appropriate cooling/warming therapies per order  - Administer medications as ordered  - Instruct and encourage patient and family to use good hand hygiene technique  - Identify and instruct in appropriate isolation precautions for identified infection/condition  Outcome: Progressing     Problem: SAFETY ADULT  Goal: Maintain or return to baseline ADL function  Description  INTERVENTIONS:  -  Assess patient's ability to carry out ADLs; assess patient's baseline for ADL function and identify physical deficits which impact ability to perform ADLs (bathing, care of mouth/teeth, toileting, grooming, dressing, etc )  - Assess/evaluate cause of self-care deficits   - Assess range of motion  - Assess patient's mobility; develop plan if impaired  - Assess patient's need for assistive devices and provide as appropriate  - Encourage maximum independence but intervene and supervise when necessary  - Involve family in performance of ADLs  - Assess for home care needs following discharge   - Consider OT consult to assist with ADL evaluation and planning for discharge  - Provide patient education as appropriate  Outcome: Progressing  Goal: Maintain or return mobility status to optimal level  Description  INTERVENTIONS:  - Assess patient's baseline mobility status (ambulation, transfers, stairs, etc )    - Identify cognitive and physical deficits and behaviors that affect mobility  - Identify mobility aids required to assist with transfers and/or ambulation (gait belt, sit-to-stand, lift, walker, cane, etc )  - [place] fall precautions as indicated by assessment  - Record patient progress and toleration of activity level on Mobility SBAR; progress patient to next Phase/Stage  - Instruct patient to call for assistance with activity based on assessment  - Consider rehabilitation consult to assist with strengthening/weightbearing, etc   Outcome: Progressing     Problem: DISCHARGE PLANNING  Goal: Discharge to home or other facility with appropriate resources  Description  INTERVENTIONS:  - Identify barriers to discharge w/patient and caregiver  - Arrange for needed discharge resources and transportation as appropriate  - Identify discharge learning needs (meds, wound care, etc )  - Arrange for interpretive services to assist at discharge as needed  - Refer to Case Management Department for coordinating discharge planning if the patient needs post-hospital services based on physician/advanced practitioner order or complex needs related to functional status, cognitive ability, or social support system  Outcome: Progressing     Problem: Knowledge Deficit  Goal: Patient/family/caregiver demonstrates understanding of disease process, treatment plan, medications, and discharge instructions  Description  Complete learning assessment and assess knowledge base    Interventions:  - Provide teaching at level of understanding  - Provide teaching via preferred learning methods  Outcome: Progressing     Problem: NEUROSENSORY - ADULT  Goal: Remains free of injury related to seizures activity  Description  INTERVENTIONS  - Maintain airway, patient safety  and administer oxygen as ordered  - Monitor patient for seizure activity, document and report duration and description of seizure to physician/advanced practitioner  - If seizure occurs,  ensure patient safety during seizure  - Reorient patient post seizure  - Seizure pads on all 4 side rails  - Instruct patient/family to notify RN of any seizure activity including if an aura is experienced  - Instruct patient/family to call for assistance with activity based on nursing assessment  - Administer anti-seizure medications if ordered    Outcome: Progressing     Problem: GASTROINTESTINAL - ADULT  Goal: Minimal or absence of nausea and/or vomiting  Description  INTERVENTIONS:  - Administer IV fluids if ordered to ensure adequate hydration  - Maintain NPO status until nausea and vomiting are resolved  - Nasogastric tube if ordered  - Administer ordered antiemetic medications as needed  - Provide nonpharmacologic comfort measures as appropriate  - Advance diet as tolerated, if ordered  - Consider nutrition services referral to assist patient with adequate nutrition and appropriate food choices  Outcome: Progressing  Goal: Maintains or returns to baseline bowel function  Description  INTERVENTIONS:  - Assess bowel function  - Encourage oral fluids to ensure adequate hydration  - Administer IV fluids if ordered to ensure adequate hydration  - Administer ordered medications as needed  - Encourage mobilization and activity  - Consider nutritional services referral to assist patient with adequate nutrition and appropriate food choices  Outcome: Progressing  Goal: Maintains adequate nutritional intake  Description  INTERVENTIONS:  - Monitor percentage of each meal consumed  - Identify factors contributing to decreased intake, treat as appropriate  - Assist with meals as needed  - Monitor I&O, weight, and lab values if indicated  - Obtain nutrition services referral as needed  Outcome: Progressing     Problem: GENITOURINARY - ADULT  Goal: Maintains or returns to baseline urinary function  Description  INTERVENTIONS:  - Assess urinary function  - Encourage oral fluids to ensure adequate hydration if ordered  - Administer IV fluids as ordered to ensure adequate hydration  - Administer ordered medications as needed  - Offer frequent toileting  - Follow urinary retention protocol if ordered  Outcome: Progressing  Goal: Absence of urinary retention  Description  INTERVENTIONS:  - Assess patients ability to void and empty bladder  - Monitor I/O  - Bladder scan as needed  - Discuss with physician/AP medications to alleviate retention as needed  - Discuss catheterization for long term situations as appropriate  Outcome: Progressing     Problem: METABOLIC, FLUID AND ELECTROLYTES - ADULT  Goal: Glucose maintained within target range  Description  INTERVENTIONS:  - Monitor Blood Glucose as ordered  - Assess for signs and symptoms of hyperglycemia and hypoglycemia  - Administer ordered medications to maintain glucose within target range  - Assess nutritional intake and initiate nutrition service referral as needed  Outcome: Progressing     Problem: SKIN/TISSUE INTEGRITY - ADULT  Goal: Skin integrity remains intact  Description  INTERVENTIONS  - Identify patients at risk for skin breakdown  - Assess and monitor skin integrity  - Assess and monitor nutrition and hydration status  - Monitor labs (i e  albumin)  - Assess for incontinence   - Turn and reposition patient  - Assist with mobility/ambulation  - Relieve pressure over bony prominences  - Avoid friction and shearing  - Provide appropriate hygiene as needed including keeping skin clean and dry  - Evaluate need for skin moisturizer/barrier cream  - Collaborate with interdisciplinary team (i e  Nutrition, Rehabilitation, etc )   - Patient/family teaching  Outcome: Progressing     Problem: MUSCULOSKELETAL - ADULT  Goal: Maintain or return mobility to safest level of function  Description  INTERVENTIONS:  - Assess patient's ability to carry out ADLs; assess patient's baseline for ADL function and identify physical deficits which impact ability to perform ADLs (bathing, care of mouth/teeth, toileting, grooming, dressing, etc )  - Assess/evaluate cause of self-care deficits   - Assess range of motion  - Assess patient's mobility  - Assess patient's need for assistive devices and provide as appropriate  - Encourage maximum independence but intervene and supervise when necessary  - Involve family in performance of ADLs  - Assess for home care needs following discharge   - Consider OT consult to assist with ADL evaluation and planning for discharge  - Provide patient education as appropriate  Outcome: Progressing     Problem: DISCHARGE PLANNING - CARE MANAGEMENT  Goal: Discharge to post-acute care or home with appropriate resources  Description  INTERVENTIONS:  - Conduct assessment to determine patient/family and health care team treatment goals, and need for post-acute services based on payer coverage, community resources, and patient preferences, and barriers to discharge  - Address psychosocial, clinical, and financial barriers to discharge as identified in assessment in conjunction with the patient/family and health care team  - Arrange appropriate level of post-acute services according to patient's   needs and preference and payer coverage in collaboration with the physician and health care team  - Communicate with and update the patient/family, physician, and health care team regarding progress on the discharge plan  - Arrange appropriate transportation to post-acute venues  Pt will go home with [facility] who will provide transportation     Outcome: Progressing

## 2019-09-27 NOTE — PHYSICAL THERAPY NOTE
Physical Therapy Treatment Note       09/27/19 1008   Pain Assessment   Pain Assessment No/denies pain   Pain Score No Pain   Restrictions/Precautions   Weight Bearing Precautions Per Order No   Other Precautions Cognitive; Fall Risk;Impulsive   General   Chart Reviewed Yes   Response to Previous Treatment Patient with no complaints from previous session  Family/Caregiver Present No   Cognition   Overall Cognitive Status Impaired   Arousal/Participation Responsive; Cooperative   Attention Attends with cues to redirect   Orientation Level Oriented to person  (knew hospital, unable to report which one)   Memory Unable to assess   Following Commands Follows one step commands inconsistently   Comments pt agreeable to PT session   Subjective   Subjective "I want to go home"   Bed Mobility   Supine to Sit 3  Moderate assistance   Additional items Assist x 1;HOB elevated; Increased time required;Verbal cues   Sit to Supine 3  Moderate assistance   Additional items Assist x 1; Increased time required;LE management;Verbal cues   Transfers   Sit to Stand 3  Moderate assistance   Additional items Assist x 2; Increased time required;Verbal cues   Stand to Sit 3  Moderate assistance   Additional items Assist x 2;Verbal cues; Impulsive   Ambulation/Elevation   Gait pattern Not tested  (PT deferred as pt unable to perform Egress test)   Balance   Static Sitting Poor +  (fair(-)/poor(+))   Dynamic Sitting Poor   Static Standing Poor   Dynamic Standing Poor -   Endurance Deficit   Endurance Deficit Yes   Activity Tolerance   Activity Tolerance Patient limited by fatigue   Nurse Made Aware Yes, RN verbalized pt appropriate for PT session   Assessment   Prognosis Guarded   Problem List Decreased strength;Decreased endurance; Impaired balance;Decreased mobility; Decreased coordination;Decreased cognition; Impaired judgement;Decreased safety awareness   Assessment Pt seen for PT treatment session this date with interventions consisting of therapeutic activity consisting of training: bed mobility, supine<>sit transfers, sit<>stand transfers, static sitting tolerance at EOB for 15 minutes w/ consistent B UE support, static standing tolerance for 2 x 1-2 minutes w/ B UE support, vc and tactile cues for static sitting posture faciliation and vc and tactile cues for static standing posture faciliation and neuro-jacquie for maintaining neutral sitting posture at EOB  Pt agreeable to PT treatment session upon arrival, pt found supine in bed w/ HOB elevated, in no apparent distress and responsive  In comparison to previous session, pt with improvements in tolerating standing attempts at this time  Post session: pt returned BTB, all needs in reach and RN notified of session findings/recommendations  Continue to recommend STR at time of d/c in order to maximize pt's functional independence and safety w/ mobility  Pt continues to be functioning below baseline level, and remains limited 2* factors listed above  PT will continue to see pt while here in order to address the deficits listed above and provide interventions consistent w/ POC in effort to achieve STGs  Barriers to Discharge Inaccessible home environment   Goals   Patient Goals "to go home"   LTG Expiration Date 10/05/19   Long Term Goal #1 goals remain appropriate   PT Treatment Day 2   Plan   Treatment/Interventions Functional transfer training;LE strengthening/ROM; Therapeutic exercise; Endurance training;Bed mobility;Gait training;Continued evaluation;Spoke to nursing;OT   Progress Slow progress, cognitive deficits   PT Frequency   (3-5x/wk)   Recommendation   Recommendation Short-term skilled PT   Equipment Recommended Walker  (continue walker trial)   PT - OK to Discharge Yes  (when medically cleared, if to STR)       Saniya Croft PT    Time of PT treatment session: [id number]

## 2019-09-27 NOTE — ASSESSMENT & PLAN NOTE
· Per CT abdomen and pelvis: Extensive fecal stasis does raise the possibility of fecal impaction should be correlated with any recent bowel movement  · Will continue with IVF  · Patient has had bowel movements - continue with daily suppository, patient had mineral oil enema on the evening of 09/26/2019  · KUB in morning hours of 09/27/2019 showed: There is a nonobstructive bowel gas pattern  Significant stool retention throughout the colon  Nephrolithiasis

## 2019-09-27 NOTE — NURSING NOTE
Call placed to Dr Gilmer Molina regarding Nephrology order to discontinue chapman cath  Dr Gilmer Molina does not want chapman removed until he evaluates patient in the morning  Will monitor

## 2019-09-27 NOTE — ASSESSMENT & PLAN NOTE
· Likely due to above-mentioned medical problems  · If no improvement in mental status over the next 24 hours, will consider Neurology evaluation  · Since medications have been discontinued patient is more awake and alert than he had been the day prior  · Neurology did order EEG  · Patient is awake and alert and oriented, speaking more and able to hold conversation  · Continuing IV fluids  · Medications that were discontinued include Depakote, sonogram, Neurontin    Vimpat was started

## 2019-09-27 NOTE — ASSESSMENT & PLAN NOTE
· As evidence by elevated white blood cell count of 12 90, hyponatremia, tachycardia, acute kidney injury  · Patient also with kidney stones  · Continue IV Rocephin    · Continue IV fluids  · Significant improvement, white count returned to normal, hyponatremia improving  · Resolved

## 2019-09-27 NOTE — ASSESSMENT & PLAN NOTE
Lab Results   Component Value Date    HGBA1C 5 8 08/28/2019       Recent Labs     09/26/19  1031 09/26/19  1613 09/26/19 2038 09/27/19  0631   POCGLU 146* 159* 162* 151*       Blood Sugar Average: Last 72 hrs:  (P) 327 [id number]   · Hold metformin  · Sliding scale insulin

## 2019-09-27 NOTE — PLAN OF CARE
Problem: OCCUPATIONAL THERAPY ADULT  Goal: Performs self-care activities at highest level of function for planned discharge setting  See evaluation for individualized goals  Description  Treatment Interventions: ADL retraining, Functional transfer training, UE strengthening/ROM, Endurance training, Cognitive reorientation, Patient/family training, Equipment evaluation/education, Neuromuscular reeducation, Compensatory technique education, Continued evaluation, Activityengagement          See flowsheet documentation for full assessment, interventions and recommendations  Outcome: Progressing  Note:   Limitation: Decreased ADL status, Decreased UE ROM, Decreased UE strength, Decreased cognition, Decreased endurance, Decreased self-care trans, Decreased high-level ADLs  Prognosis: Fair  Assessment: Patient participated in Skilled OT session this date with interventions consisting of  therapeutic activities to: increase activity tolerance, elicit righting and equilibrium reactions for improved postural control and alignment during transitional movements, increase dynamic sit/ stand balance during functional activity  and increase OOB/ sitting tolerance   Patient agreeable to OT treatment session, upon arrival patient was found supine in bed and in no apparent distress  In comparison to previous session, patient with improvements in in functional mobility, dynamic standing balance and increased motivation to participate in therapy  Patient requiring verbal cues for safety, cognitive assistance to anticipate next step and one step directives  Patient continues to be functioning below baseline level, occupational performance remains limited secondary to factors listed above and increased risk for falls and injury  From OT standpoint, recommendation at time of d/c would be Short Term Rehab     Patient to benefit from continued Occupational Therapy treatment while in the hospital to address deficits as defined above and maximize level of functional independence with ADLs and functional mobility        OT Discharge Recommendation: Short Term Rehab  OT - OK to Discharge: Yes(Once medically cleared)     Bryan Longoria, OT

## 2019-09-27 NOTE — OCCUPATIONAL THERAPY NOTE
[address] Treatment Note     Patient Name: Lucinda Bhat  DPLXA'H Date: 9/27/2019  Problem List  Principal Problem:    Sepsis due to urinary tract infection [facility])  Active Problems:    Type 2 diabetes mellitus without complication, without long-term current use of insulin (HCC)    Generalized convulsive epilepsy ([facility] )    Essential hypertension    Other specified hypothyroidism    Thrombocytopenia ([facility] )    ADRIANA (acute kidney injury) ([place] [id number] )    Acute cystitis with hematuria    Toxic metabolic encephalopathy    Other constipation    Acute dehydration       09/27/19 1021   Restrictions/Precautions   Weight Bearing Precautions Per Order No   Other Precautions Cognitive; Fall Risk;Impulsive   Lifestyle   Intrinsic Gratification Enjoys watching football   Pain Assessment   Pain Assessment No/denies pain   Pain Score No Pain   Bed Mobility   Supine to Sit 3  Moderate assistance   Additional items Assist x 1;HOB elevated; Bedrails; Increased time required;Verbal cues   Sit to Supine 3  Moderate assistance   Additional items Assist x 1; Increased time required;Verbal cues;LE management   Transfers   Sit to Stand 3  Moderate assistance   Additional items Assist x 2; Increased time required;Verbal cues   Stand to Sit 3  Moderate assistance   Additional items Assist x 2;Verbal cues; Impulsive   Functional Mobility   Additional Comments Further functional mobility was deferred at this time due to safety concerns   Cognition   Overall Cognitive Status Impaired   Arousal/Participation Responsive; Cooperative   Attention Attends with cues to redirect   Orientation Level Oriented to person   Memory Unable to assess   Following Commands Follows one step commands inconsistently   Activity Tolerance   Activity Tolerance Patient limited by fatigue   Medical Staff Made Aware DOUGLAS Hernandez made aware of outcomes    Assessment   Assessment Patient participated in Skilled OT session this date with interventions consisting of  therapeutic activities to: increase activity tolerance, elicit righting and equilibrium reactions for improved postural control and alignment during transitional movements, increase dynamic sit/ stand balance during functional activity  and increase OOB/ sitting tolerance   Patient agreeable to OT treatment session, upon arrival patient was found supine in bed and in no apparent distress  In comparison to previous session, patient with improvements in in functional mobility, dynamic standing balance and increased motivation to participate in therapy  Patient requiring verbal cues for safety, cognitive assistance to anticipate next step and one step directives  Patient continues to be functioning below baseline level, occupational performance remains limited secondary to factors listed above and increased risk for falls and injury  From OT standpoint, recommendation at time of d/c would be Short Term Rehab  Patient to benefit from continued Occupational Therapy treatment while in the hospital to address deficits as defined above and maximize level of functional independence with ADLs and functional mobility  Plan   Treatment Interventions ADL retraining;Functional transfer training;UE strengthening/ROM; Endurance training;Patient/family training;Equipment evaluation/education; Neuromuscular reeducation; Compensatory technique education; Activityengagement   Goal Expiration Date 09/05/19   OT Treatment Day 2   OT Frequency 3-5x/wk   Recommendation   OT Discharge Recommendation Short Term Rehab   OT - OK to Discharge Yes  (Once medically cleared)     Nesha Posey, OT

## 2019-09-27 NOTE — SOCIAL WORK
I met with the pt's care giver and she signed the needed paperwork to start the option process and the information was sent to the area of aging, d/c plan will be discussed at d/c planning, pt needs a determination letter before he can go to a snf for rehab

## 2019-09-27 NOTE — PLAN OF CARE
Problem: PHYSICAL THERAPY ADULT  Goal: Performs mobility at highest level of function for planned discharge setting  See evaluation for individualized goals  Description  Treatment/Interventions: Functional transfer training, LE strengthening/ROM, Therapeutic exercise, Endurance training, Bed mobility, Gait training, Continued evaluation, Spoke to nursing, OT     See flowsheet documentation for full assessment, interventions and recommendations  Carlos Best PT     Outcome: Progressing  Note:   Prognosis: Guarded  Problem List: Decreased strength, Decreased endurance, Impaired balance, Decreased mobility, Decreased coordination, Decreased cognition, Impaired judgement, Decreased safety awareness  Assessment: Pt seen for PT treatment session this date with interventions consisting of therapeutic activity consisting of training: bed mobility, supine<>sit transfers, sit<>stand transfers, static sitting tolerance at EOB for 15 minutes w/ consistent B UE support, static standing tolerance for 2 x 1-2 minutes w/ B UE support, vc and tactile cues for static sitting posture faciliation and vc and tactile cues for static standing posture faciliation and neuro-jacquie for maintaining neutral sitting posture at EOB  Pt agreeable to PT treatment session upon arrival, pt found supine in bed w/ HOB elevated, in no apparent distress and responsive  In comparison to previous session, pt with improvements in tolerating standing attempts at this time  Post session: pt returned BTB, all needs in reach and RN notified of session findings/recommendations  Continue to recommend STR at time of d/c in order to maximize pt's functional independence and safety w/ mobility  Pt continues to be functioning below baseline level, and remains limited 2* factors listed above  PT will continue to see pt while here in order to address the deficits listed above and provide interventions consistent w/ POC in effort to achieve STGs    Barriers to Discharge: Inaccessible home environment     Recommendation: Short-term skilled PT     PT - OK to Discharge: Yes(when medically cleared, if to STR)    See flowsheet documentation for full assessment

## 2019-09-27 NOTE — PROGRESS NOTES
Progress Note - Nephrology   Charlette Oconnell 48 y o  male MRN: [id number]  Unit/Bed#: -01 Encounter: [id number]    A/P:  1  Acute kidney injury   Renal function continues to improve, will discontinue IV fluids as well as Carrillo catheter at this time  Continue closely monitor kidney function  2  Hypovolemia   Significantly improved status post volume expansion  Will discontinue IV fluids at this time and depend on the patient eating and drinking appropriately  3  Hypovolemic hyponatremia   Significantly improved, patient's sodium level is 137 millimole/L up from 133 millimole/L at this time  4  Hypertension   Blood pressure is little bit elevated, will place on hydralazine for now, look for placing the patient back on his lisinopril tomorrow morning  Hold avoid Ace inhibitors at this time as the patient is still not his baseline creatinine  5  Metabolic encephalopathy-resolved  6   Epilepsy          Follow up reason for today's visit:  Acute kidney injury/electrolyte disorders/hypovolemia    Sepsis due to urinary tract infection [facility])    Patient Active Problem List   Diagnosis    Cataract    C[facility]    Type 2 diabetes mellitus without complication, without long-term current use of insulin ([facility]    Generalized convulsive epilepsy ([facility]    Hyperlipidemia    Essential hypertension    Other specified hypothyroidism    Osteoporosis    Scoliosis    Vitamin B12 deficiency    Vitamin D deficiency    Screening for colon cancer    Seborrheic dermatitis of scalp    Headache    Nearsightedness    Behavior concern in adult    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HCC)    Thrombocytopenia (HCC)    ADRIANA (acute kidney injury) ([facility] )    Acute cystitis with hematuria    Toxic metabolic encephalopathy    Sepsis due to urinary tract infection (HCC)    Other constipation    Acute dehydration         Subjective:   No acute events overnight    Objective:     Vitals: Blood pressure (!) 177/86, pulse 87, temperature 97 8 °F (36 6 °C), temperature source Temporal, resp  rate 18, height 5' 10" (1 778 m), weight 83 7 kg (184 lb 9 6 oz), SpO2 91 %  ,Body mass index is 26 49 kg/m²  Weight (last 2 days)     Date/Time   Weight    09/27/19 0600   83 7 (184 6)    09/26/19 0600   82 6 (182 1)    09/25/19 1451   83 2 (183 42)                Intake/Output Summary (Last 24 hours) at 9/27/2019 1535  Last data filed at 9/27/2019 1434  Gross per 24 hour   Intake 857 ml   Output 2875 ml   Net -2018 ml     I/O last 3 completed shifts: In: 2147 [P O :240; I V :1857; IV Piggyback:50]  Out: 7538 [Urine:3225]    Urethral Catheter 16 Fr   (Active)   Reasons to continue Urinary Catheter  Acute urinary retention/obstruction failing urinary retention protocol 9/26/2019  8:21 AM   Goal for Removal N/A- discharging with chapman 9/26/2019  8:21 AM   Site Assessment Clean;Skin intact 9/27/2019  9:00 AM   Collection Container Standard drainage bag 9/27/2019  9:00 AM   Securement Method Securing device (Describe) 9/27/2019  9:00 AM   Output (mL) 1400 mL 9/27/2019  2:34 PM       Physical Exam: BP (!) 177/86 (BP Location: Left arm)   Pulse 87   Temp 97 8 °F (36 6 °C) (Temporal)   Resp 18   Ht 5' 10" (1 778 m)   Wt 83 7 kg (184 lb 9 6 oz)   SpO2 91%   BMI 26 49 kg/m²     General Appearance:    Alert, cooperative, no distress, appears stated age   Head:    Normocephalic, without obvious abnormality, atraumatic   Eyes:    Conjunctiva/corneas clear   Ears:    Normal external ears   Nose:   Nares normal, septum midline, mucosa normal, no drainage    or sinus tenderness   Throat:   Lips, mucosa, and tongue normal; teeth and gums normal   Neck:   Supple   Back:     Symmetric, no curvature, ROM normal, no CVA tenderness   Lungs:     Clear to auscultation bilaterally, respirations unlabored   Chest wall:    No tenderness or deformity   Heart:    Regular rate and rhythm, S1 and S2 normal, no murmur, rub   or gallop   Abdomen: Soft, non-tender, bowel sounds active   Extremities:   Extremities normal, atraumatic, no cyanosis or edema   Skin:   Skin color, texture, turgor normal, no rashes or lesions   Lymph nodes:   Cervical normal   Neurologic:   CNII-XII intact            Lab, Imaging and other studies: I have personally reviewed pertinent labs  CBC:   Lab Results   Component Value Date    WBC 6 20 09/27/2019    HGB 11 4 (L) 09/27/2019    HCT 33 2 (L) 09/27/2019    MCV 99 09/27/2019    PLT 59 (L) 09/27/2019    MCH 34 2 (H) 09/27/2019    MCHC 34 4 09/27/2019    RDW 15 0 (H) 09/27/2019    MPV 11 5 09/27/2019     CMP:   Lab Results   Component Value Date    K 4 8 09/27/2019     09/27/2019    CO2 22 09/27/2019    BUN 55 (H) 09/27/2019    CREATININE 1 36 (H) 09/27/2019    CALCIUM 8 8 09/27/2019    AST 29 09/27/2019    ALT 18 09/27/2019    ALKPHOS 62 09/27/2019    EGFR 59 09/27/2019         Results from last 7 days   Lab Units 09/27/19  0507 09/26/19  0448 09/25/19  0525   POTASSIUM mmol/L 4 8 4 2 4 0   CHLORIDE mmol/L 107 102 98   CO2 mmol/L 22 22 22   BUN mg/dL 55* 80* 81*   CREATININE mg/dL 1 36* 2 49* 3 89*   CALCIUM mg/dL 8 8 8 4* 8 4*   ALK PHOS U/L 62 50 38*   ALT U/L 18 14 9   AST U/L 29 22 17         Phosphorus: No results found for: PHOS  Magnesium: No results found for: MG  Urinalysis: No results found for: COLORU, CLARITYU, SPECGRAV, PHUR, LEUKOCYTESUR, NITRITE, PROTEINUA, GLUCOSEU, KETONESU, BILIRUBINUR, BLOODU  Ionized Calcium: No results found for: CAION  Coagulation: No results found for: PT, INR, APTT  Troponin: No results found for: TROPONINI  ABG: No results found for: PHART, KBS7DWZ, PO2ART, UWT8HIJ, K7WTDQHT, BEART, SOURCE  Radiology review:     IMAGING  Procedure: Xr Abdomen 1 View Kub    Result Date: 9/27/2019  Narrative: ABDOMEN INDICATION:   Abdominal pain  COMPARISON:  CT 9/24/2019 VIEWS:  AP supine FINDINGS: There is a nonobstructive bowel gas pattern  Significant stool retention throughout the colon   No discernible free air on this supine study  Upright or left lateral decubitus imaging is more sensitive to detect subtle free air in the appropriate setting  Bilateral renal calculi again noted  Visualized lung bases are clear  Degenerative changes lumbar spine  Impression: There is a nonobstructive bowel gas pattern  Significant stool retention throughout the colon  Nephrolithiasis  Workstation performed: [id number]     Procedure: Us Kidney And Bladder    Result Date: 9/25/2019  Narrative: RENAL ULTRASOUND INDICATION:   RENAL FAILURE, CHRONIC   Dortha Plough COMPARISON: CT scan 9/24/2019  TECHNIQUE:   Ultrasound of the retroperitoneum was performed with a curvilinear transducer utilizing volumetric sweeps and still imaging techniques  FINDINGS: KIDNEYS: Symmetric and normal size  Right kidney:  14 6 cm  Left kidney:  14 6 cm  Right kidney Normal echogenicity and contour  No suspicious masses detected  No hydronephrosis  Tiny calculus seen on the CT scan is not appreciated on this study  No perinephric fluid collections  Left kidney Normal echogenicity and contour  No suspicious masses detected  No hydronephrosis  Multiple calculi scattered throughout the kidney, largest at the upper pole measuring 9 mm  No perinephric fluid collections  URETERS: Nonvisualized  BLADDER: Normally distended  No focal thickening or mass lesions  Bilateral ureteral jets detected  Impression: No hydronephrosis  Multiple left renal calculi   Workstation performed: [id number]       Current Facility-Administered Medications   Medication Dose Route Frequency    acetaminophen (TYLENOL) tablet 650 mg  650 mg Oral Q6H PRN    benztropine (COGENTIN) tablet 0 5 mg  0 5 mg Oral BID    bisacodyl (DULCOLAX) rectal suppository 10 mg  10 mg Rectal Daily    cefTRIAXone (ROCEPHIN) IVPB (premix) 1,000 mg  1,000 mg Intravenous Q24H    docusate sodium (COLACE) capsule 100 mg  100 mg Oral BID    hydrOXYzine HCL (ATARAX) tablet 25 mg  25 mg Oral TID   Ke Flower insulin lispro (HumaLOG) 100 units/mL subcutaneous injection 1-5 Units  1-5 Units Subcutaneous HS    insulin lispro (HumaLOG) 100 units/mL subcutaneous injection 1-6 Units  1-6 Units Subcutaneous TID AC    lacosamide (VIMPAT) 150 mg in sodium chloride 0 9 % 100 mL IVPB  150 mg Intravenous Q12H    levothyroxine tablet 100 mcg  100 mcg Oral Early Morning    ondansetron (ZOFRAN) injection 4 mg  4 mg Intravenous Once    QUEtiapine (SEROquel XR) 24 hr tablet 300 mg  300 mg Oral HS    And    QUEtiapine (SEROquel XR) 24 hr tablet 100 mg  100 mg Oral HS    QUEtiapine (SEROquel XR) 24 hr tablet 150 mg  150 mg Oral BID    And    QUEtiapine (SEROquel XR) 24 hr tablet 50 mg  50 mg Oral BID    senna (SENOKOT) tablet 17 2 mg  2 tablet Oral Daily PRN    simethicone (MYLICON) chewable tablet 80 mg  80 mg Oral Q6H PRN     Medications Discontinued During This Encounter   Medication Reason    QUEtiapine (SEROquel XR) 24 hr tablet 400 mg     QUEtiapine (SEROquel XR) 400 mg     QUEtiapine (SEROquel XR) 24 hr tablet 200 mg     magnesium sulfate IVPB (premix) SOLN 1 g     levothyroxine tablet 125 mcg     divalproex sodium (DEPAKOTE ER) 24 hr tablet 500 mg     divalproex sodium (DEPAKOTE ER) 24 hr tablet 500 mg     gabapentin (NEURONTIN) capsule 100 mg     lacosamide (VIMPAT) 150 mg in sodium chloride 0 9 % 100 mL IVPB     sodium chloride 0 9 % infusion        Jennifer Mathur DO

## 2019-09-27 NOTE — PROGRESS NOTES
Progress Note Urszula Lawson 1965, 48 y o  male MRN: [id number]    Unit/Bed#: -01 Encounter: [id number]    Primary Care Provider: HUNTER Taylor   Date and time admitted to hospital: 9/24/2019 12:01 PM        * Sepsis due to urinary tract infection [facility])  Assessment & Plan  · As evidence by elevated white blood cell count of 12 90, hyponatremia, tachycardia, acute kidney injury  · Patient also with kidney stones  · Continue IV Rocephin  · Continue IV fluids  · Significant improvement, white count returned to normal, hyponatremia improving  · Resolved    ADRIANA (acute kidney injury) ([facility] )  Assessment & Plan  · Unclear etiology possibly due to dehydration  · Check urine studies-normal  · Check renal ultrasound - no hydronephrosis, positive left renal calculi  · Follow-up CT abdomen pelvis - bilateral renal stones question pyelonephritis, extensive fecal material   · Consult Nephrology - most likely secondary to dehydration due to medications  · Kidney functions are and improving with IV fluids  · IV fluids  · Significant improvement  · 09/27/2019 BUN and creatinine are 55 and 1 36  Acute cystitis with hematuria  Assessment & Plan  · Present on admission  · Ceftriaxone pending culture results    Toxic metabolic encephalopathy  Assessment & Plan  · Likely due to above-mentioned medical problems  · If no improvement in mental status over the next 24 hours, will consider Neurology evaluation  · Since medications have been discontinued patient is more awake and alert than he had been the day prior  · Neurology did order EEG  · Patient is awake and alert and oriented, speaking more and able to hold conversation  · Continuing IV fluids  · Medications that were discontinued include Depakote, sonogram, Neurontin  Vimpat was started    Acute dehydration  Assessment & Plan  · Unsure is due to UTI or Kidney stones  · Will continue with IVF  · Monitor labs    · Improving    Other constipation  Assessment & Plan  · Per CT abdomen and pelvis: Extensive fecal stasis does raise the possibility of fecal impaction should be correlated with any recent bowel movement  · Will continue with IVF  · Patient has had bowel movements - continue with daily suppository, patient had mineral oil enema on the evening of 09/26/2019  · KUB in morning hours of 09/27/2019 showed: There is a nonobstructive bowel gas pattern  Significant stool retention throughout the colon  Nephrolithiasis  Generalized convulsive epilepsy ([facility] )  Assessment & Plan  · Valproic acid level noted to be elevated, will hold for now  · We will hold Zonegran as well in light of acute kidney injury  · If no improvement in renal function, will consider Neurology consultation for medication adjustments  · Neurology may changes:  Discontinue Depakote, Zonegran, Neurontin and start Vimpat  · Neurology ordered EEG - pending  · There is consideration to decrease Seroquel dose as well - has not been done as of 09/27/2019  · No signs and symptoms of seizure activity    Thrombocytopenia ([facility] [address] )  Assessment & Plan  · Appears to be chronic issue for the patient and is followed as an outpatient  · Will hold heparin for now  · Monitor blood counts    Type 2 diabetes mellitus without complication, without long-term current use of insulin [facility])  Assessment & Plan  Lab Results   Component Value Date    HGBA1C 5 8 08/28/2019       Recent Labs     09/26/19  1031 09/26/19  1613 09/26/19  2038 09/27/19  0631   POCGLU 146* 159* 162* 151*       Blood Sugar Average: Last 72 hrs:  (P) 836 [id number]   · Hold metformin  · Sliding scale insulin    Essential hypertension  Assessment & Plan  · Hold lisinopril in light of acute kidney injury    Other specified hypothyroidism  Assessment & Plan  · Continue Synthroid  · Last TSH from 6 weeks ago was within normal limits      VTE Pharmacologic Prophylaxis: Pharmacologic: SCDs only as patient has thrombocytopenia  Patient Centered Rounds:  I have performed bedside rounds with nursing staff today  Discussions with Specialists or Other Care Team Provider:  Nursing, case management, Nephrology, Urology, Neurology  Education and Discussions with Family / Patient:  Discussed with patient, will discuss with caregiver and sister when they arrive  Current Length of Stay: 3 day(s)    Current Patient Status: Inpatient   Certification Statement: The patient will continue to require additional inpatient hospital stay due to Continued need for IV antibiotics and IV fluids for kidney function and constipation  Discharge Plan:  Home when appropriate; home verses skilled nursing  Code Status: Level 1 - Full Code    Subjective:   Patient is lying in bed  He is attempting to eat some breakfast   He states that his belly hurts him  Staff tell me that he has been continuing to have bowel movements however KUB from last evening shows consistent increased amount of stool  Will continue with daily suppositories, patient did have mineral oil enema last evening  Will attempt to have PT and OT assist the patient to the bedside commode for possible assistance with having a BM  Will also give the patient simethicone for gas pains  Objective:     Vitals:   Temp (24hrs), Av °F (36 7 °C), Min:97 3 °F (36 3 °C), Max:98 8 °F (37 1 °C)    Temp:  [97 3 °F (36 3 °C)-98 8 °F (37 1 °C)] 97 8 °F (36 6 °C)  HR:  [81-87] 87  Resp:  [18] 18  BP: (137-177)/(74-86) 177/86  SpO2:  [90 %-98 %] 91 %  Body mass index is 26 49 kg/m²  Input and Output Summary (last 24 hours): Intake/Output Summary (Last 24 hours) at 2019 0928  Last data filed at 2019 0513  Gross per 24 hour   Intake 1147 ml   Output 2075 ml   Net -928 ml       Physical Exam:     Physical Exam   Constitutional: Vital signs are normal  He appears well-developed and well-nourished  He is cooperative  HENT:   Head: Normocephalic and atraumatic     Nose: Nose normal    Mouth/Throat: Oropharynx is clear and moist and mucous membranes are normal    Eyes: Conjunctivae and EOM are normal    Neck: Normal range of motion and full passive range of motion without pain  Cardiovascular: Normal rate, regular rhythm, normal heart sounds and normal pulses  Pulmonary/Chest: Effort normal and breath sounds normal    Abdominal: Bowel sounds are normal  He exhibits distension  Patient continues with bowel movements  Patient has positive gas pains  Genitourinary:   Genitourinary Comments: Carrillo catheter draining yellow urine  This catheter was placed in the emergency department  Musculoskeletal:   Will have PT/OT work with him to get him OOB to the [facility]   Neurological: He is alert  Oriented, has mental disabilities   Skin: Skin is warm  Psychiatric: He has a normal mood and affect  His speech is normal and behavior is normal        Additional Data:     Labs:    Results from last 7 days   Lab Units 09/27/19  0507 09/26/19  0448   WBC Thousand/uL 6 20 5 70   HEMOGLOBIN g/dL 11 4* 11 8*   HEMATOCRIT % 33 2* 34 9*   PLATELETS Thousands/uL 59* 51*   NEUTROS PCT %  --  57   LYMPHS PCT %  --  16*   LYMPHO PCT % 16*  --    MONOS PCT %  --  25*   MONO PCT % 29*  --    EOS PCT %  --  1     Results from last 7 days   Lab Units 09/27/19  0507   POTASSIUM mmol/L 4 8   CHLORIDE mmol/L 107   CO2 mmol/L 22   BUN mg/dL 55*   CREATININE mg/dL 1 36*   CALCIUM mg/dL 8 8   ALK PHOS U/L 62   ALT U/L 18   AST U/L 29     Results from last 7 days   Lab Units 09/24/19  1302   INR  1 15     Results from last 7 days   Lab Units 09/27/19  0631 09/26/19  2038 09/26/19  1613 09/26/19  1031 09/26/19  0608 09/25/19  2043 09/25/19  1609 09/25/19  1101 09/25/19  0631 09/24/19  2019 09/24/19  1550   POC GLUCOSE mg/dl 151* 162* 159* 146* 123 145* 130 75 83 91 81           * I Have Reviewed All Lab Data Listed Above  * Additional Pertinent Lab Tests Reviewed:  Daisy 68 Admission  Reviewed    Imaging:  Imaging Reports Reviewed Today Include:  KUB:  There is a nonobstructive bowel gas pattern  Significant stool retention throughout the colon  Nephrolithiasis  Recent Cultures (last 7 days):     Results from last 7 days   Lab Units 09/25/19 1815   URINE CULTURE  No Growth <1000 cfu/mL       Last 24 Hours Medication List:     Current Facility-Administered Medications:  acetaminophen 650 mg Oral Q6H PRN Jennifer Olson MD    benztropine 0 5 mg Oral BID Jennifer Olson MD    bisacodyl 10 mg Rectal Daily Jennifer Olson MD    cefTRIAXone 1,000 mg Intravenous Q24H Jennifer Olson MD Last Rate: 1,000 mg (09/26/19 1418)   docusate sodium 100 mg Oral BID Jennifer Olson MD    hydrOXYzine HCL 25 mg Oral TID Jennifer Olson MD    insulin lispro 1-5 Units Subcutaneous HS Jennifer Olson MD    insulin lispro 1-6 Units Subcutaneous TID [facility] Jennifer Olson MD    lacosamide (VIMPAT) IVPB 150 mg Intravenous Q12H Mando Chapman PA-C Last Rate: 150 mg (09/27/19 0806)   levothyroxine 100 mcg Oral Early Morning Mando Chapman PA-C    ondansetron 4 mg Intravenous Once Jennifer Olson MD    QUEtiapine 300 mg Oral HS Zenine MD Esvin    And        QUEtiapine 100 mg Oral HS Zeniандрей Mcallister MD    QUEtiapine 150 mg Oral BID Zeniандрей Mcallister MD    And        QUEtiapine 50 mg Oral BID Zenine Blocker, MD    senna 2 tablet Oral Daily PRN Jennifer Olson MD    simethicone 80 mg Oral Q6H PRN HUNTER Garcia    sodium chloride 100 mL/hr Intravenous Continuous Jennifer Olson MD Last Rate: 100 mL/hr (09/27/19 0809)        Today, Patient Was Seen By: HUNTER Aquino    ** Please Note: Dictation voice to text software may have been used in the creation of this document   **

## 2019-09-27 NOTE — NURSING NOTE
Pt restless and request to go bathroom, yet unable to move bowels Hospitalist Arun Bose notified and patient sent down for Stat Xray of abdomen  Pt is constipated and senokot administered prn and prune juice given with no result in moving bowels  Pt needs mineral oil enema which is currently not available  Supervisor notified  Pharmacy needs to supply medication in morning  Will continue to monitor and reassess  Bed low, call bell in reach

## 2019-09-27 NOTE — ASSESSMENT & PLAN NOTE
· Valproic acid level noted to be elevated, will hold for now  · We will hold Zonegran as well in light of acute kidney injury  · If no improvement in renal function, will consider Neurology consultation for medication adjustments  · Neurology may changes:  Discontinue Depakote, Zonegran, Neurontin and start Vimpat  · Neurology ordered EEG - pending  · There is consideration to decrease Seroquel dose as well - has not been done as of 09/27/2019  · No signs and symptoms of seizure activity

## 2019-09-28 PROBLEM — E87.1 ACUTE HYPONATREMIA: Status: ACTIVE | Noted: 2019-09-28

## 2019-09-28 LAB
ANION GAP SERPL CALCULATED.3IONS-SCNC: 5 MMOL/L (ref 4–13)
BUN SERPL-MCNC: 41 MG/DL (ref 7–25)
CALCIUM SERPL-MCNC: 9.1 MG/DL (ref 8.6–10.5)
CH50 SERPL-ACNC: >60 U/ML (ref 42–999999)
CHLORIDE SERPL-SCNC: 107 MMOL/L (ref 98–107)
CO2 SERPL-SCNC: 26 MMOL/L (ref 21–31)
CREAT SERPL-MCNC: 1 MG/DL (ref 0.7–1.3)
GFR SERPL CREATININE-BSD FRML MDRD: 86 ML/MIN/1.73SQ M
GLUCOSE SERPL-MCNC: 131 MG/DL (ref 65–140)
GLUCOSE SERPL-MCNC: 133 MG/DL (ref 65–140)
GLUCOSE SERPL-MCNC: 135 MG/DL (ref 65–99)
GLUCOSE SERPL-MCNC: 163 MG/DL (ref 65–140)
GLUCOSE SERPL-MCNC: 177 MG/DL (ref 65–140)
POTASSIUM SERPL-SCNC: 5.1 MMOL/L (ref 3.5–5.5)
SODIUM SERPL-SCNC: 138 MMOL/L (ref 134–143)

## 2019-09-28 PROCEDURE — 99232 SBSQ HOSP IP/OBS MODERATE 35: CPT | Performed by: NURSE PRACTITIONER

## 2019-09-28 PROCEDURE — 80048 BASIC METABOLIC PNL TOTAL CA: CPT | Performed by: NURSE PRACTITIONER

## 2019-09-28 PROCEDURE — 82948 REAGENT STRIP/BLOOD GLUCOSE: CPT

## 2019-09-28 PROCEDURE — 82360 CALCULUS ASSAY QUANT: CPT | Performed by: INTERNAL MEDICINE

## 2019-09-28 PROCEDURE — 99232 SBSQ HOSP IP/OBS MODERATE 35: CPT | Performed by: INTERNAL MEDICINE

## 2019-09-28 RX ORDER — LISINOPRIL 10 MG/1
10 TABLET ORAL DAILY
Status: DISCONTINUED | OUTPATIENT
Start: 2019-09-28 | End: 2019-09-30 | Stop reason: HOSPADM

## 2019-09-28 RX ADMIN — QUETIAPINE FUMARATE 50 MG: 50 TABLET, EXTENDED RELEASE ORAL at 17:06

## 2019-09-28 RX ADMIN — LEVOTHYROXINE SODIUM 100 MCG: 100 TABLET ORAL at 07:58

## 2019-09-28 RX ADMIN — BENZTROPINE MESYLATE 0.5 MG: 1 TABLET ORAL at 17:05

## 2019-09-28 RX ADMIN — QUETIAPINE FUMARATE 150 MG: 50 TABLET, EXTENDED RELEASE ORAL at 08:26

## 2019-09-28 RX ADMIN — DOCUSATE SODIUM 100 MG: 100 CAPSULE, LIQUID FILLED ORAL at 08:26

## 2019-09-28 RX ADMIN — QUETIAPINE FUMARATE 100 MG: 50 TABLET, EXTENDED RELEASE ORAL at 22:08

## 2019-09-28 RX ADMIN — DOCUSATE SODIUM 100 MG: 100 CAPSULE, LIQUID FILLED ORAL at 17:05

## 2019-09-28 RX ADMIN — SODIUM CHLORIDE 150 MG: 9 INJECTION, SOLUTION INTRAVENOUS at 20:49

## 2019-09-28 RX ADMIN — LISINOPRIL 10 MG: 10 TABLET ORAL at 11:53

## 2019-09-28 RX ADMIN — INSULIN LISPRO 1 UNITS: 100 INJECTION, SOLUTION INTRAVENOUS; SUBCUTANEOUS at 17:04

## 2019-09-28 RX ADMIN — QUETIAPINE FUMARATE 50 MG: 50 TABLET, EXTENDED RELEASE ORAL at 08:27

## 2019-09-28 RX ADMIN — HYDROXYZINE HYDROCHLORIDE 25 MG: 25 TABLET ORAL at 08:26

## 2019-09-28 RX ADMIN — ACETAMINOPHEN 650 MG: 325 TABLET ORAL at 19:49

## 2019-09-28 RX ADMIN — HYDROXYZINE HYDROCHLORIDE 25 MG: 25 TABLET ORAL at 22:08

## 2019-09-28 RX ADMIN — HYDROXYZINE HYDROCHLORIDE 25 MG: 25 TABLET ORAL at 17:05

## 2019-09-28 RX ADMIN — INSULIN LISPRO 1 UNITS: 100 INJECTION, SOLUTION INTRAVENOUS; SUBCUTANEOUS at 11:53

## 2019-09-28 RX ADMIN — BENZTROPINE MESYLATE 0.5 MG: 1 TABLET ORAL at 08:26

## 2019-09-28 RX ADMIN — BISACODYL 10 MG: 10 SUPPOSITORY RECTAL at 08:26

## 2019-09-28 RX ADMIN — SODIUM CHLORIDE 150 MG: 9 INJECTION, SOLUTION INTRAVENOUS at 08:27

## 2019-09-28 RX ADMIN — QUETIAPINE FUMARATE 300 MG: 150 TABLET, EXTENDED RELEASE ORAL at 22:07

## 2019-09-28 RX ADMIN — QUETIAPINE FUMARATE 150 MG: 50 TABLET, EXTENDED RELEASE ORAL at 17:04

## 2019-09-28 NOTE — ASSESSMENT & PLAN NOTE
· Present on admission  · Ceftriaxone pending culture results  · Urine culture - No growth to date  · Will d/c antibiotics

## 2019-09-28 NOTE — PROGRESS NOTES
Progress Note Hamilton Gaona 1965, 48 y o  male MRN: [id number]    Unit/Bed#: -01 Encounter: [id number]    Primary Care Provider: HUNTER Ballesteros   Date and time admitted to hospital: 9/24/2019 12:01 PM        * Other constipation  Assessment & Plan  · Per CT abdomen and pelvis: Extensive fecal stasis does raise the possibility of fecal impaction should be correlated with any recent bowel movement  · Will continue with IVF  · Patient has had bowel movements - continue with daily suppository, patient had mineral oil enema on the evening of 09/26/2019  · KUB in morning hours of 09/27/2019 showed: There is a nonobstructive bowel gas pattern  Significant stool retention throughout the colon  Nephrolithiasis    · Continue daily suppositories     Type 2 diabetes mellitus without complication, without long-term current use of insulin [facility])  Assessment & Plan  Lab Results   Component Value Date    HGBA1C 5 8 08/28/2019       Recent Labs     09/27/19  1047 09/27/19  1618 09/27/19 2014 09/28/19  0652   POCGLU 149* 180* 90 133       Blood Sugar Average: Last 72 hrs:  (P) 132 [id number]   · Hold metformin  · Sliding scale insulin    Generalized convulsive epilepsy ([facility] [address] )  Assessment & Plan  · Valproic acid level noted to be elevated, will hold for now  · We will hold Zonegran as well in light of acute kidney injury  · If no improvement in renal function, will consider Neurology consultation for medication adjustments  · Neurology may changes:  Discontinue Depakote, Zonegran, Neurontin and start Vimpat  · Neurology ordered EEG - pending  · There is consideration to decrease Seroquel dose as well - has not been done as of 09/27/2019  · No signs and symptoms of seizure activity    Thrombocytopenia ([address] )  Assessment & Plan  · Appears to be chronic issue for the patient and is followed as an outpatient  · Will hold heparin for now  · Monitor blood counts    Essential hypertension  Assessment & Plan  · Hold lisinopril in light of acute kidney injury    Acute dehydration  Assessment & Plan  · Due to hypovolemia  · Unsure is due to UTI or Kidney stones  · Will continue with IVF  · Monitor labs  · Improving  · Resolved     ADRIANA (acute kidney injury) ([facility] [address] )  Assessment & Plan  · Unclear etiology possibly due to dehydration  · Check urine studies-normal  · Check renal ultrasound - no hydronephrosis, positive left renal calculi  · Follow-up CT abdomen pelvis - bilateral renal stones question pyelonephritis, extensive fecal material   · Consult Nephrology - most likely secondary to dehydration due to medications  · Kidney functions are and improving with IV fluids  · IV fluids  · Significant improvement  · 09/27/2019 BUN and creatinine are 55 and 1 36   · 9/28/2019: 41/ 1 00  · resolved    Acute cystitis with hematuria  Assessment & Plan  · Present on admission  · Ceftriaxone pending culture results  · Urine culture - No growth to date  · Will d/c antibiotics    Toxic metabolic encephalopathy  Assessment & Plan  · Likely due to above-mentioned medical problems  · If no improvement in mental status over the next 24 hours, will consider Neurology evaluation  · Since medications have been discontinued patient is more awake and alert than he had been the day prior  · Neurology did order EEG  · Patient is awake and alert and oriented, speaking more and able to hold conversation  · Continuing IV fluids  · Medications that were discontinued include Depakote, sonogram, Neurontin  Vimpat was started  · Resolved     Other specified hypothyroidism  Assessment & Plan  · Continue Synthroid  · Last TSH from 6 weeks ago was within normal limits    Sepsis due to urinary tract infection ([facility] )  Assessment & Plan  · As evidence by elevated white blood cell count of 12 90, hyponatremia, tachycardia, acute kidney injury  · Patient also with kidney stones  · Continue IV Rocephin    · Continue IV fluids  · Significant improvement, white count returned to normal, hyponatremia improving  · Resolved  · Patient with Sepsis due to hypovolemia  Acute hyponatremia  Assessment & Plan  · Due to hypovolemia from ADRIANA  · Treated with IV fluids  · This is currently resolved    VTE Pharmacologic Prophylaxis: Pharmacologic: SCDs only as the patient has thrombocytopenia    Patient Centered Rounds: I have performed bedside rounds with nursing staff today  Discussions with Specialists or Other Care Team Provider: nursing, case management, PT/OT, nephrology, urology, neurology  Education and Discussions with Family / Patient: discussed with patient and will discuss with caregiver and sister when they arrive  Current Length of Stay: 4 day(s)    Current Patient Status: Inpatient   Certification Statement: The patient will continue to require additional inpatient hospital stay due to continued need for management of kidney functions and need for resolution of constipation  Discharge Plan: SNF for rehabilitation    Code Status: Level 1 - Full Code    Subjective:   Patient is sitting in bed  He states that his belly feels better  He continues to have multiple bowel movements throughout the day  Objective:     Vitals:   Temp (24hrs), Av 4 °F (36 3 °C), Min:97 1 °F (36 2 °C), Max:98 °F (36 7 °C)    Temp:  [97 1 °F (36 2 °C)-98 °F (36 7 °C)] 97 1 °F (36 2 °C)  HR:  [81-93] 81  Resp:  [18] 18  BP: (141-169)/(79-89) 169/85  SpO2:  [93 %-95 %] 93 %  Body mass index is 26 54 kg/m²  Input and Output Summary (last 24 hours): Intake/Output Summary (Last 24 hours) at 2019 1106  Last data filed at 2019 1001  Gross per 24 hour   Intake 480 ml   Output 4900 ml   Net -4420 ml       Physical Exam:     Physical Exam   Constitutional: Vital signs are normal  He appears well-developed and well-nourished  He is cooperative  HENT:   Head: Normocephalic and atraumatic     Nose: Nose normal    Mouth/Throat: Oropharynx is clear and moist and mucous membranes are normal    Eyes: Conjunctivae and EOM are normal    Neck: Normal range of motion  Cardiovascular: Normal rate, regular rhythm, normal heart sounds and normal pulses  Pulmonary/Chest: Effort normal and breath sounds normal    Abdominal: Bowel sounds are normal    Decreased in size - minimally round  Patient continues to have bowel movements  No complaints of gas pains today  Genitourinary:   Genitourinary Comments: Carrillo catheter retained until urology sees patiens   Musculoskeletal:   Working with PT/OT, will require SNF upon discharge  Neurological: He is alert  Pleasant, has history of MR   Skin: Skin is warm  Psychiatric: He has a normal mood and affect  His speech is normal and behavior is normal        Additional Data:     Labs:    Results from last 7 days   Lab Units 09/27/19  0507 09/26/19  0448   WBC Thousand/uL 6 20 5 70   HEMOGLOBIN g/dL 11 4* 11 8*   HEMATOCRIT % 33 2* 34 9*   PLATELETS Thousands/uL 59* 51*   NEUTROS PCT %  --  57   LYMPHS PCT %  --  16*   LYMPHO PCT % 16*  --    MONOS PCT %  --  25*   MONO PCT % 29*  --    EOS PCT %  --  1     Results from last 7 days   Lab Units 09/28/19  0620 09/27/19  0507   POTASSIUM mmol/L 5 1 4 8   CHLORIDE mmol/L 107 107   CO2 mmol/L 26 22   BUN mg/dL 41* 55*   CREATININE mg/dL 1 00 1 36*   CALCIUM mg/dL 9 1 8 8   ALK PHOS U/L  --  62   ALT U/L  --  18   AST U/L  --  29     Results from last 7 days   Lab Units 09/24/19  1302   INR  1 15     Results from last 7 days   Lab Units 09/28/19  0652 09/27/19 2014 09/27/19  1618 09/27/19  1047 09/27/19  0631 09/26/19  2038 09/26/19  1613 09/26/19  1031 09/26/19  0608 09/25/19  2043 09/25/19  1609 09/25/19  1101   POC GLUCOSE mg/dl 133 90 180* 149* 151* 162* 159* 146* 123 145* 130 75           * I Have Reviewed All Lab Data Listed Above  * Additional Pertinent Lab Tests Reviewed:  All Labs For Current Hospital Admission  Reviewed    Imaging:  Imaging Reports Reviewed Today Include: none    Recent Cultures (last 7 days):     Results from last 7 days   Lab Units 09/25/19  1815   URINE CULTURE  No Growth <1000 cfu/mL       Last 24 Hours Medication List:     Current Facility-Administered Medications:  acetaminophen 650 mg Oral Q6H PRN Gideon Campos MD    benztropine 0 5 mg Oral BID Gideon Campos MD    bisacodyl 10 mg Rectal Daily Gideon Campos MD    cefTRIAXone 1,000 mg Intravenous Q24H Gideon Campos MD Last Rate: 1,000 mg (09/27/19 1732)   docusate sodium 100 mg Oral BID Gideon Campos MD    hydrOXYzine HCL 25 mg Oral TID Gideon Campos MD    insulin lispro 1-5 Units Subcutaneous HS Gideon Campos MD    insulin lispro 1-6 Units Subcutaneous TID [facility] Gideon Campos MD    lacosamide (VIMPAT) IVPB 150 mg Intravenous Q12H Ariana Sterling PA-C Last Rate: 150 mg (09/28/19 0827)   levothyroxine 100 mcg Oral Early Morning Ariana Sterling PA-C    lisinopril 10 mg Oral Daily Gaycruz Palm,     mineral oil 1 enema Rectal Once Gideon Campos MD    ondansetron 4 mg Intravenous Once Gideon Campos MD    QUEtiapine 300 mg Oral HS Smitty Boast, MD    And        QUEtiapine 100 mg Oral HS Smitty Boast, MD    QUEtiapine 150 mg Oral BID Smitty Boast, MD    And        QUEtiapine 50 mg Oral BID Smitty Boast, MD    senna 2 tablet Oral Daily PRN Gideon Campos MD    simethicone 80 mg Oral Q6H PRN HUNTER Browne         Today, Patient Was Seen By: HUNTER Browne    ** Please Note: Dictation voice to text software may have been used in the creation of this document   **

## 2019-09-28 NOTE — ASSESSMENT & PLAN NOTE
· Likely due to above-mentioned medical problems  · If no improvement in mental status over the next 24 hours, will consider Neurology evaluation  · Since medications have been discontinued patient is more awake and alert than he had been the day prior  · Neurology did order EEG  · Patient is awake and alert and oriented, speaking more and able to hold conversation  · Continuing IV fluids  · Medications that were discontinued include Depakote, sonogram, Neurontin    Vimpat was started  · Resolved

## 2019-09-28 NOTE — ASSESSMENT & PLAN NOTE
Lab Results   Component Value Date    HGBA1C 5 8 08/28/2019       Recent Labs     09/27/19  1047 09/27/19  1618 09/27/19 2014 09/28/19  0652   POCGLU 149* 180* 90 133       Blood Sugar Average: Last 72 hrs:  (P) 132 [id number]   · Hold metformin  · Sliding scale insulin

## 2019-09-28 NOTE — PROGRESS NOTES
Progress Note - Nephrology   Ze Oconnell 48 y o  male MRN: [id number]  Unit/Bed#: -01 Encounter: [id number]    A/P:  1  Acute kidney injury               patient is approaching baseline creatinine, IV fluids were discontinued yesterday Patient for potential Carrillo catheter removal later today as directed by Urology  2  Hypovolemia               resolved  3  Hypovolemic hyponatremia              resolved continue monitor  4  Hypertension               restart lisinopril 10 mg this morning  Continue monitor patient's blood pressures in the meantime  5  Metabolic encephalopathy-resolved  6  Epilepsy       Follow up reason for today's visit:  Acute renal failure, electrolyte disorders    Sepsis due to urinary tract infection [facility])    Patient Active Problem List   Diagnosis    Cataract    Cerebral palsy ([facility] )    Type 2 diabetes mellitus without complication, without long-term current use of insulin ([facility] )    Generalized convulsive epilepsy ([facility] )    Hyperlipidemia    Essential hypertension    Other specified hypothyroidism    Osteoporosis    Scoliosis    Vitamin B12 deficiency    Vitamin D deficiency    Screening for colon cancer    Seborrheic dermatitis of scalp    Headache    Nearsightedness    Behavior concern in adult    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HCC)    Thrombocytopenia (HCC)    [facility]    Acute cystitis with hematuria    Toxic metabolic encephalopathy    Sepsis due to urinary tract infection (HCC)    Other constipation    Acute dehydration         Subjective:   No acute events overnight, patient is having good bowel movements  Objective:     Vitals: Blood pressure 169/85, pulse 81, temperature (!) 97 1 °F (36 2 °C), temperature source Temporal, resp  rate 18, height 5' 10" (1 778 m), weight 83 9 kg (185 lb), SpO2 93 %  ,Body mass index is 26 54 kg/m²      Weight (last 2 days)     Date/Time   Weight    09/28/19 0600   83 9 (185)    09/27/19 0600   83 7 (184 6)    09/26/19 0600   82 6 (182 1)                Intake/Output Summary (Last 24 hours) at 9/28/2019 1001  Last data filed at 9/28/2019 0601  Gross per 24 hour   Intake 240 ml   Output 3700 ml   Net -3460 ml     I/O last 3 completed shifts: In: 240 [P O :240]  Out: 5175 [Urine:5175]    Urethral Catheter 16 Fr   (Active)   Reasons to continue Urinary Catheter  Acute urinary retention/obstruction failing urinary retention protocol 9/26/2019  8:21 AM   Goal for Removal N/A- discharging with chapman 9/26/2019  8:21 AM   Site Assessment Clean;Skin intact 9/27/2019  9:00 AM   Collection Container Standard drainage bag 9/27/2019  9:00 AM   Securement Method Securing device (Describe) 9/27/2019  9:00 AM   Output (mL) 300 mL 9/28/2019  6:01 AM       Physical Exam: /85 (BP Location: Left arm)   Pulse 81   Temp (!) 97 1 °F (36 2 °C) (Temporal)   Resp 18   Ht 5' 10" (1 778 m)   Wt 83 9 kg (185 lb)   SpO2 93%   BMI 26 54 kg/m²     General Appearance:    Alert, cooperative, no distress, appears stated age   Head:    Normocephalic, without obvious abnormality, atraumatic   Eyes:    Conjunctiva/corneas clear   Ears:    Normal external ears   Nose:   Nares normal, septum midline, mucosa normal, no drainage    or sinus tenderness   Throat:   Lips, mucosa, and tongue normal; teeth and gums normal   Neck:   Supple   Back:     Symmetric, no curvature, ROM normal, no CVA tenderness   Lungs:     Clear to auscultation bilaterally, respirations unlabored   Chest wall:    No tenderness or deformity   Heart:    Regular rate and rhythm, S1 and S2 normal, no murmur, rub   or gallop   Abdomen:     Soft, non-tender, bowel sounds active   Extremities:   Extremities normal, atraumatic, no cyanosis or edema   Skin:   Skin color, texture, turgor normal, no rashes or lesions   Lymph nodes:   Cervical normal   Neurologic:   CNII-XII intact            Lab, Imaging and other studies: I have personally reviewed pertinent labs   CBC: No results found for: WBC, HGB, HCT, MCV, PLT, ADJUSTEDWBC, MCH, MCHC, RDW, MPV, NRBC  CMP:   Lab Results   Component Value Date    K 5 1 09/28/2019     09/28/2019    CO2 26 09/28/2019    BUN 41 (H) 09/28/2019    CREATININE 1 00 09/28/2019    CALCIUM 9 1 09/28/2019    EGFR 86 09/28/2019         Results from last 7 days   Lab Units 09/28/19  0620 09/27/19  0507 09/26/19  0448 09/25/19  0525   POTASSIUM mmol/L 5 1 4 8 4 2 4 0   CHLORIDE mmol/L 107 107 102 98   CO2 mmol/L 26 22 22 22   BUN mg/dL 41* 55* 80* 81*   CREATININE mg/dL 1 00 1 36* 2 49* 3 89*   CALCIUM mg/dL 9 1 8 8 8 4* 8 4*   ALK PHOS U/L  --  62 50 38*   ALT U/L  --  18 14 9   AST U/L  --  29 22 17         Phosphorus: No results found for: PHOS  Magnesium: No results found for: MG  Urinalysis: No results found for: COLORU, CLARITYU, SPECGRAV, PHUR, LEUKOCYTESUR, NITRITE, PROTEINUA, GLUCOSEU, KETONESU, BILIRUBINUR, BLOODU  Ionized Calcium: No results found for: CAION  Coagulation: No results found for: PT, INR, APTT  Troponin: No results found for: TROPONINI  ABG: No results found for: PHART, HTP1ZAP, PO2ART, EPO9ECD, N8OQAZBS, BEART, SOURCE  Radiology review:     IMAGING  Procedure: Xr Abdomen 1 View Kub    Result Date: 9/27/2019  Narrative: ABDOMEN INDICATION:   Abdominal pain  COMPARISON:  CT 9/24/2019 VIEWS:  AP supine FINDINGS: There is a nonobstructive bowel gas pattern  Significant stool retention throughout the colon  No discernible free air on this supine study  Upright or left lateral decubitus imaging is more sensitive to detect subtle free air in the appropriate setting  Bilateral renal calculi again noted  Visualized lung bases are clear  Degenerative changes lumbar spine  Impression: There is a nonobstructive bowel gas pattern  Significant stool retention throughout the colon  Nephrolithiasis   Workstation performed: [id number]       Current Facility-Administered Medications   Medication Dose Route Frequency    acetaminophen (TYLENOL) tablet 650 mg  650 mg Oral Q6H PRN    benztropine (COGENTIN) tablet 0 5 mg  0 5 mg Oral BID    bisacodyl (DULCOLAX) rectal suppository 10 mg  10 mg Rectal Daily    cefTRIAXone (ROCEPHIN) IVPB (premix) 1,000 mg  1,000 mg Intravenous Q24H    docusate sodium (COLACE) capsule 100 mg  100 mg Oral BID    hydrOXYzine HCL (ATARAX) tablet 25 mg  25 mg Oral TID    insulin lispro (HumaLOG) 100 units/mL subcutaneous injection 1-5 Units  1-5 Units Subcutaneous HS    insulin lispro (HumaLOG) 100 units/mL subcutaneous injection 1-6 Units  1-6 Units Subcutaneous TID AC    lacosamide (VIMPAT) 150 mg in sodium chloride 0 9 % 100 mL IVPB  150 mg Intravenous Q12H    levothyroxine tablet 100 mcg  100 mcg Oral Early Morning    mineral oil enema 1 enema  1 enema Rectal Once    ondansetron (ZOFRAN) injection 4 mg  4 mg Intravenous Once    QUEtiapine (SEROquel XR) 24 hr tablet 300 mg  300 mg Oral HS    And    QUEtiapine (SEROquel XR) 24 hr tablet 100 mg  100 mg Oral HS    QUEtiapine (SEROquel XR) 24 hr tablet 150 mg  150 mg Oral BID    And    QUEtiapine (SEROquel XR) 24 hr tablet 50 mg  50 mg Oral BID    senna (SENOKOT) tablet 17 2 mg  2 tablet Oral Daily PRN    simethicone (MYLICON) chewable tablet 80 mg  80 mg Oral Q6H PRN     Medications Discontinued During This Encounter   Medication Reason    QUEtiapine (SEROquel XR) 24 hr tablet 400 mg     QUEtiapine (SEROquel XR) 400 mg     QUEtiapine (SEROquel XR) 24 hr tablet 200 mg     magnesium sulfate IVPB (premix) SOLN 1 g     levothyroxine tablet 125 mcg     divalproex sodium (DEPAKOTE ER) 24 hr tablet 500 mg     divalproex sodium (DEPAKOTE ER) 24 hr tablet 500 mg     gabapentin (NEURONTIN) capsule 100 mg     lacosamide (VIMPAT) 150 mg in sodium chloride 0 9 % 100 mL IVPB     sodium chloride 0 9 % infusion        Slade Omer DO

## 2019-09-28 NOTE — ASSESSMENT & PLAN NOTE
· Per CT abdomen and pelvis: Extensive fecal stasis does raise the possibility of fecal impaction should be correlated with any recent bowel movement  · Will continue with IVF  · Patient has had bowel movements - continue with daily suppository, patient had mineral oil enema on the evening of 09/26/2019  · KUB in morning hours of 09/27/2019 showed: There is a nonobstructive bowel gas pattern  Significant stool retention throughout the colon  Nephrolithiasis    · Continue daily suppositories

## 2019-09-28 NOTE — ASSESSMENT & PLAN NOTE
· Due to hypovolemia  · Unsure is due to UTI or Kidney stones  · Will continue with IVF  · Monitor labs    · Improving  · Resolved

## 2019-09-28 NOTE — ASSESSMENT & PLAN NOTE
· As evidence by elevated white blood cell count of 12 90, hyponatremia, tachycardia, acute kidney injury  · Patient also with kidney stones  · Continue IV Rocephin  · Continue IV fluids  · Significant improvement, white count returned to normal, hyponatremia improving  · Resolved  · Patient with Sepsis due to hypovolemia

## 2019-09-29 LAB
ANION GAP SERPL CALCULATED.3IONS-SCNC: 9 MMOL/L (ref 4–13)
BASOPHILS # BLD AUTO: 0.1 THOUSANDS/ΜL (ref 0–0.1)
BASOPHILS NFR BLD AUTO: 1 % (ref 0–2)
BUN SERPL-MCNC: 34 MG/DL (ref 7–25)
CALCIUM SERPL-MCNC: 9.6 MG/DL (ref 8.6–10.5)
CHLORIDE SERPL-SCNC: 104 MMOL/L (ref 98–107)
CO2 SERPL-SCNC: 28 MMOL/L (ref 21–31)
CREAT SERPL-MCNC: 1.05 MG/DL (ref 0.7–1.3)
EOSINOPHIL # BLD AUTO: 0.4 THOUSAND/ΜL (ref 0–0.61)
EOSINOPHIL NFR BLD AUTO: 4 % (ref 0–5)
ERYTHROCYTE [DISTWIDTH] IN BLOOD BY AUTOMATED COUNT: 15.2 % (ref 11.5–14.5)
GFR SERPL CREATININE-BSD FRML MDRD: 81 ML/MIN/1.73SQ M
GLUCOSE SERPL-MCNC: 105 MG/DL (ref 65–140)
GLUCOSE SERPL-MCNC: 110 MG/DL (ref 65–99)
GLUCOSE SERPL-MCNC: 130 MG/DL (ref 65–140)
GLUCOSE SERPL-MCNC: 161 MG/DL (ref 65–140)
GLUCOSE SERPL-MCNC: 194 MG/DL (ref 65–140)
HCT VFR BLD AUTO: 36.4 % (ref 42–47)
HGB BLD-MCNC: 12.1 G/DL (ref 14–18)
LYMPHOCYTES # BLD AUTO: 2.9 THOUSANDS/ΜL (ref 0.6–4.47)
LYMPHOCYTES NFR BLD AUTO: 28 % (ref 21–51)
MCH RBC QN AUTO: 33.4 PG (ref 26–34)
MCHC RBC AUTO-ENTMCNC: 33.3 G/DL (ref 31–37)
MCV RBC AUTO: 100 FL (ref 81–99)
MONOCYTES # BLD AUTO: 3.4 THOUSAND/ΜL (ref 0.17–1.22)
MONOCYTES NFR BLD AUTO: 33 % (ref 2–12)
NEUTROPHILS # BLD AUTO: 3.6 THOUSANDS/ΜL (ref 1.4–6.5)
NEUTS SEG NFR BLD AUTO: 35 % (ref 42–75)
PLATELET # BLD AUTO: 178 THOUSANDS/UL (ref 149–390)
PMV BLD AUTO: 10.8 FL (ref 8.6–11.7)
POTASSIUM SERPL-SCNC: 4.4 MMOL/L (ref 3.5–5.5)
RBC # BLD AUTO: 3.63 MILLION/UL (ref 4.3–5.9)
SODIUM SERPL-SCNC: 141 MMOL/L (ref 134–143)
WBC # BLD AUTO: 10.3 THOUSAND/UL (ref 4.8–10.8)

## 2019-09-29 PROCEDURE — 85025 COMPLETE CBC W/AUTO DIFF WBC: CPT | Performed by: NURSE PRACTITIONER

## 2019-09-29 PROCEDURE — 82948 REAGENT STRIP/BLOOD GLUCOSE: CPT

## 2019-09-29 PROCEDURE — 80048 BASIC METABOLIC PNL TOTAL CA: CPT | Performed by: NURSE PRACTITIONER

## 2019-09-29 PROCEDURE — 99233 SBSQ HOSP IP/OBS HIGH 50: CPT | Performed by: NURSE PRACTITIONER

## 2019-09-29 RX ADMIN — LISINOPRIL 10 MG: 10 TABLET ORAL at 09:04

## 2019-09-29 RX ADMIN — QUETIAPINE FUMARATE 300 MG: 150 TABLET, EXTENDED RELEASE ORAL at 22:05

## 2019-09-29 RX ADMIN — LEVOTHYROXINE SODIUM 100 MCG: 100 TABLET ORAL at 06:00

## 2019-09-29 RX ADMIN — QUETIAPINE FUMARATE 50 MG: 50 TABLET, EXTENDED RELEASE ORAL at 09:06

## 2019-09-29 RX ADMIN — SODIUM CHLORIDE 150 MG: 9 INJECTION, SOLUTION INTRAVENOUS at 20:53

## 2019-09-29 RX ADMIN — HYDROXYZINE HYDROCHLORIDE 25 MG: 25 TABLET ORAL at 22:03

## 2019-09-29 RX ADMIN — QUETIAPINE FUMARATE 50 MG: 50 TABLET, EXTENDED RELEASE ORAL at 17:10

## 2019-09-29 RX ADMIN — SODIUM CHLORIDE 150 MG: 9 INJECTION, SOLUTION INTRAVENOUS at 09:06

## 2019-09-29 RX ADMIN — QUETIAPINE FUMARATE 100 MG: 50 TABLET, EXTENDED RELEASE ORAL at 22:04

## 2019-09-29 RX ADMIN — INSULIN LISPRO 1 UNITS: 100 INJECTION, SOLUTION INTRAVENOUS; SUBCUTANEOUS at 22:05

## 2019-09-29 RX ADMIN — DOCUSATE SODIUM 100 MG: 100 CAPSULE, LIQUID FILLED ORAL at 09:06

## 2019-09-29 RX ADMIN — HYDROXYZINE HYDROCHLORIDE 25 MG: 25 TABLET ORAL at 17:08

## 2019-09-29 RX ADMIN — ACETAMINOPHEN 650 MG: 325 TABLET ORAL at 18:12

## 2019-09-29 RX ADMIN — QUETIAPINE FUMARATE 150 MG: 50 TABLET, EXTENDED RELEASE ORAL at 09:05

## 2019-09-29 RX ADMIN — HYDROXYZINE HYDROCHLORIDE 25 MG: 25 TABLET ORAL at 09:05

## 2019-09-29 RX ADMIN — QUETIAPINE FUMARATE 150 MG: 50 TABLET, EXTENDED RELEASE ORAL at 17:09

## 2019-09-29 RX ADMIN — BENZTROPINE MESYLATE 0.5 MG: 1 TABLET ORAL at 17:07

## 2019-09-29 RX ADMIN — INSULIN LISPRO 2 UNITS: 100 INJECTION, SOLUTION INTRAVENOUS; SUBCUTANEOUS at 17:09

## 2019-09-29 RX ADMIN — BENZTROPINE MESYLATE 0.5 MG: 1 TABLET ORAL at 09:06

## 2019-09-29 NOTE — PROGRESS NOTES
Progress Note - Fanny Vega 48 y o  male MRN: [id number]    Unit/Bed#: -01 Encounter: [id number]      Assessment:  Urinary retention resolved  Carrillo was removed yesterday and he has been voiding well  Postvoid residual via bladder scan was only 13 mL  Acute renal injury also resolved with creatinine 1 05  He also apparently passed a kidney stone yesterday  Plan:  Continue to observe voiding pattern  Repeat abdominal x-ray for his left-sided kidney stones and possible passed stone  Await stone analysis  Subjective: The patient is resting comfortably and has no complaints  He is voiding without any difficulty  I have discussed the case with the hospitalist     Objective:     Vitals: Blood pressure 150/88, pulse 82, temperature 99 6 °F (37 6 °C), temperature source Temporal, resp  rate 18, height 5' 10" (1 778 m), weight 81 9 kg (180 lb 8 9 oz), SpO2 94 %  ,Body mass index is 25 91 kg/m²  Intake/Output Summary (Last 24 hours) at 9/29/2019 1456  Last data filed at 9/29/2019 1159  Gross per 24 hour   Intake 300 ml   Output 900 ml   Net -600 ml       Physical Exam: Abdomen: soft, non-tender; bowel sounds normal; no masses,  no organomegaly     Invasive Devices     Peripheral Intravenous Line            Peripheral IV 09/27/19 Left Hand 1 day                Lab, Imaging and other studies: I have personally reviewed pertinent reports

## 2019-09-29 NOTE — ASSESSMENT & PLAN NOTE
· Valproic acid level noted to be elevated, will hold for now  · We will hold Zonegran as well in light of acute kidney injury  · If no improvement in renal function, will consider Neurology consultation for medication adjustments  · Neurology may changes:  Discontinue Depakote, Zonegran, Neurontin and start Vimpat  · Neurology ordered EEG - No focal abnormalities nor interictal epileptiform discharges were noted  No electrographic seizures occurred during this recording    · There is consideration to decrease Seroquel dose as well - has not been done as of 09/27/2019  · No signs and symptoms of seizure activity

## 2019-09-29 NOTE — ASSESSMENT & PLAN NOTE
· Appears to be chronic issue for the patient and is followed as an outpatient  · Will hold heparin for now  · Monitor blood counts  · resolved

## 2019-09-29 NOTE — ASSESSMENT & PLAN NOTE
· Unclear etiology possibly due to dehydration  · Check urine studies-normal  · Check renal ultrasound - no hydronephrosis, positive left renal calculi  · Follow-up CT abdomen pelvis - bilateral renal stones question pyelonephritis, extensive fecal material   · Consult Nephrology - most likely secondary to dehydration due to medications  · Kidney functions are and improving with IV fluids  · IV fluids  · Significant improvement  · 09/27/2019 BUN and creatinine are 55 and 1 36   · 9/28/2019: 41/ 1 00  · resolved

## 2019-09-29 NOTE — PROGRESS NOTES
Patient incontinent of urine  Linens changed, skin barrier applied  Patient resting in bed at present, SCD's intact  Call bell and belongings within reach, will continue to monitor

## 2019-09-29 NOTE — ASSESSMENT & PLAN NOTE
Lab Results   Component Value Date    HGBA1C 5 8 08/28/2019       Recent Labs     09/28/19  1105 09/28/19  1604 09/28/19 2057 09/29/19  0608   POCGLU 177* 163* 131 105       Blood Sugar Average: Last 72 hrs:  (P) 143 [id number]   · Hold metformin  · Sliding scale insulin

## 2019-09-29 NOTE — PROGRESS NOTES
Progress Note Pedro Luis Blake 1965, 48 y o  male MRN: [id number]    Unit/Bed#: -01 Encounter: [id number]    Primary Care Provider: HUNTER Matson   Date and time admitted to hospital: 9/24/2019 12:01 PM        * Other constipation  Assessment & Plan  · Per CT abdomen and pelvis: Extensive fecal stasis does raise the possibility of fecal impaction should be correlated with any recent bowel movement  · Patient has had bowel movements - continue with daily suppository, patient had mineral oil enema on the evening of 09/26/2019  · KUB in morning hours of 09/27/2019 showed: There is a nonobstructive bowel gas pattern  Significant stool retention throughout the colon  Nephrolithiasis  · Continue daily suppositories     Type 2 diabetes mellitus without complication, without long-term current use of insulin [facility])  Assessment & Plan  Lab Results   Component Value Date    HGBA1C 5 8 08/28/2019       Recent Labs     09/28/19  1105 09/28/19  1604 09/28/19  2057 09/29/19  0608   POCGLU 177* 163* 131 105       Blood Sugar Average: Last 72 hrs:  (P) 143 [id number]   · Hold metformin  · Sliding scale insulin    Generalized convulsive epilepsy ([facility] [address] )  Assessment & Plan  · Valproic acid level noted to be elevated, will hold for now  · We will hold Zonegran as well in light of acute kidney injury  · If no improvement in renal function, will consider Neurology consultation for medication adjustments  · Neurology may changes:  Discontinue Depakote, Zonegran, Neurontin and start Vimpat  · Neurology ordered EEG - No focal abnormalities nor interictal epileptiform discharges were noted  No electrographic seizures occurred during this recording    · There is consideration to decrease Seroquel dose as well - has not been done as of 09/27/2019  · No signs and symptoms of seizure activity    Essential hypertension  Assessment & Plan  · Hold lisinopril in light of acute kidney injury    Other specified hypothyroidism  Assessment & Plan  · Continue Synthroid  · Last TSH from 6 weeks ago was within normal limits    Acute cystitis with hematuria  Assessment & Plan  · Present on admission  · Ceftriaxone   · Urine culture - No growth to date  · Will d/c antibiotics    Acute dehydration  Assessment & Plan  · Due to hypovolemia  · Unsure is due to UTI or Kidney stones  · Will continue with IVF  · Monitor labs  · Improving  · Resolved     Toxic metabolic encephalopathy  Assessment & Plan  · Likely due to above-mentioned medical problems  · If no improvement in mental status over the next 24 hours, will consider Neurology evaluation  · Since medications have been discontinued patient is more awake and alert than he had been the day prior  · Neurology did order EEG  · Patient is awake and alert and oriented, speaking more and able to hold conversation  · Continuing IV fluids  · Medications that were discontinued include Depakote, sonogram, Neurontin  Vimpat was started  · Resolved     Sepsis due to urinary tract infection ([facility] [address] )  Assessment & Plan  · As evidence by elevated white blood cell count of 12 90, hyponatremia, tachycardia, acute kidney injury  · Patient also with kidney stones  · Continue IV Rocephin  · Continue IV fluids  · Significant improvement, white count returned to normal, hyponatremia improving  · Resolved  · Patient with Sepsis due to hypovolemia      Acute hyponatremia  Assessment & Plan  · Due to hypovolemia from ADRIANA  · Treated with IV fluids  · This is currently resolved    Thrombocytopenia ([facility])  Assessment & Plan  · Appears to be chronic issue for the patient and is followed as an outpatient  · Will hold heparin for now  · Monitor blood counts  · resolved    ADRIANA (acute kidney injury) ([facility] [address] )  Assessment & Plan  · Unclear etiology possibly due to dehydration  · Check urine studies-normal  · Check renal ultrasound - no hydronephrosis, positive left renal calculi  · Follow-up CT abdomen pelvis - bilateral renal stones question pyelonephritis, extensive fecal material   · Consult Nephrology - most likely secondary to dehydration due to medications  · Kidney functions are and improving with IV fluids  · IV fluids  · Significant improvement  · 2019 BUN and creatinine are 55 and 1 36   · 2019: 41/ 1 00  · resolved    VTE Pharmacologic Prophylaxis: Pharmacologic: SCDs only as patient has had thrombocytopenia  Patient Centered Rounds: I have performed bedside rounds with nursing staff today  Discussions with Specialists or Other Care Team Provider: nursing, case management, PT/OT, nephrology, urology, neruology  Education and Discussions with Family / Patient: discussed discharge plans with sister  Current Length of Stay: 5 day(s)    Current Patient Status: Inpatient   Certification Statement: The patient will continue to require additional inpatient hospital stay due to continue to monitor kidney function, continue resolution of constipation, placement for rehab  Discharge Plan: to rehab    Code Status: Level 1 - Full Code    Subjective:   Patient is lying in bed, he is almost sideways in bed, but states "I want to be this way"  He is currently attempting to have a bowel movement, and has been positive for BM daily for the last 2 days  He is more awake and alert, he is able to hold a conversation, and appears to understand more today  Objective:     Vitals:   Temp (24hrs), Av °F (37 2 °C), Min:98 °F (36 7 °C), Max:99 6 °F (37 6 °C)    Temp:  [98 °F (36 7 °C)-99 6 °F (37 6 °C)] 99 6 °F (37 6 °C)  HR:  [82-86] 82  Resp:  [16-18] 18  BP: (132-168)/(72-98) 150/88  SpO2:  [94 %-99 %] 94 %  Body mass index is 25 91 kg/m²  Input and Output Summary (last 24 hours):        Intake/Output Summary (Last 24 hours) at 2019 1001  Last data filed at 2019 1530  Gross per 24 hour   Intake 240 ml   Output 300 ml   Net -60 ml       Physical Exam:     Physical Exam   Constitutional: Vital signs are normal  He appears well-developed and well-nourished  He is cooperative  HENT:   Head: Normocephalic and atraumatic  Nose: Nose normal    Mouth/Throat: Oropharynx is clear and moist and mucous membranes are normal    Eyes: EOM are normal    Neck: Normal range of motion  Cardiovascular: Normal rate, regular rhythm, normal heart sounds and normal pulses  Pulmonary/Chest: Effort normal and breath sounds normal    Abdominal: Soft  Normal appearance and bowel sounds are normal    Abdomen has returned to normal per the sister  The patient denies any discomfort  Positive for BM  Genitourinary:   Genitourinary Comments: Carrillo catheter removed, patient urinating in urinal     Musculoskeletal:   Generalized weakness  Neurological: He is alert  Able to hold conversation  Skin: Skin is warm and dry  Psychiatric: He has a normal mood and affect  His speech is normal and behavior is normal        Additional Data:     Labs:    Results from last 7 days   Lab Units 09/29/19  0600   WBC Thousand/uL 10 30   HEMOGLOBIN g/dL 12 1*   HEMATOCRIT % 36 4*   PLATELETS Thousands/uL 178   NEUTROS PCT % 35*   LYMPHS PCT % 28   MONOS PCT % 33*   EOS PCT % 4     Results from last 7 days   Lab Units 09/29/19  0600  09/27/19  0507   POTASSIUM mmol/L 4 4   < > 4 8   CHLORIDE mmol/L 104   < > 107   CO2 mmol/L 28   < > 22   BUN mg/dL 34*   < > 55*   CREATININE mg/dL 1 05   < > 1 36*   CALCIUM mg/dL 9 6   < > 8 8   ALK PHOS U/L  --   --  62   ALT U/L  --   --  18   AST U/L  --   --  29    < > = values in this interval not displayed       Results from last 7 days   Lab Units 09/24/19  1302   INR  1 15     Results from last 7 days   Lab Units 09/29/19  0608 09/28/19  2057 09/28/19  1604 09/28/19  1105 09/28/19  0652 09/27/19  2014 09/27/19  1618 09/27/19  1047 09/27/19  0631 09/26/19  2038 09/26/19  1613 09/26/19  1031   POC GLUCOSE mg/dl 105 131 163* 177* 133 90 180* 149* 151* 162* 159* 146*           * I Have Reviewed All Lab Data Listed Above  * Additional Pertinent Lab Tests Reviewed: [facility] Admission  Reviewed    Imaging:  Imaging Reports Reviewed Today Include: none    Recent Cultures (last 7 days):     Results from last 7 days   Lab Units 09/25/19 1815   URINE CULTURE  No Growth <1000 cfu/mL       Last 24 Hours Medication List:     Current Facility-Administered Medications:  acetaminophen 650 mg Oral Q6H PRN Yasmine Murillo MD    benztropine 0 5 mg Oral BID Yasmine Murillo MD    bisacodyl 10 mg Rectal Daily Yasmine Murillo MD    docusate sodium 100 mg Oral BID Yasmine Murillo MD    hydrOXYzine HCL 25 mg Oral TID Yasmine Murillo MD    insulin lispro 1-5 Units Subcutaneous HS Yasmine Murillo MD    insulin lispro 1-6 Units Subcutaneous TID [facility] Yasmine Murillo MD    lacosamide (VIMPAT) IVPB 150 mg Intravenous Q12H Oneta Camera, PA-C Last Rate: 150 mg (09/29/19 0906)   levothyroxine 100 mcg Oral Early Morning Oneta Camera, PA-C    lisinopril 10 mg Oral Daily Boy Fernandez DO    mineral oil 1 enema Rectal Once Yasmine Murillo MD    ondansetron 4 mg Intravenous Once Yasmine Murillo MD    QUEtiapine 300 mg Oral HS Dipti Faulkner MD    And        QUEtiapine 100 mg Oral HS Dipti Faulkner MD    QUEtiapine 150 mg Oral BID Dipti Faulkner MD    And        QUEtiapine 50 mg Oral BID Dipti Faulkner MD    senna 2 tablet Oral Daily PRN Yasmine Murillo MD    simethicone 80 mg Oral Q6H PRN HUNTER Squires         Today, Patient Was Seen By: HUNTER Squires    ** Please Note: Dictation voice to text software may have been used in the creation of this document   **

## 2019-09-29 NOTE — ASSESSMENT & PLAN NOTE
· Per CT abdomen and pelvis: Extensive fecal stasis does raise the possibility of fecal impaction should be correlated with any recent bowel movement  · Patient has had bowel movements - continue with daily suppository, patient had mineral oil enema on the evening of 09/26/2019  · KUB in morning hours of 09/27/2019 showed: There is a nonobstructive bowel gas pattern  Significant stool retention throughout the colon  Nephrolithiasis    · Continue daily suppositories

## 2019-09-30 ENCOUNTER — APPOINTMENT (INPATIENT)
Dept: RADIOLOGY | Facility: HOSPITAL | Age: 54
DRG: 871 | End: 2019-09-30
Payer: MEDICARE

## 2019-09-30 VITALS
HEART RATE: 88 BPM | WEIGHT: 181 LBS | TEMPERATURE: 98.4 F | HEIGHT: 70 IN | SYSTOLIC BLOOD PRESSURE: 140 MMHG | OXYGEN SATURATION: 95 % | DIASTOLIC BLOOD PRESSURE: 62 MMHG | RESPIRATION RATE: 18 BRPM | BODY MASS INDEX: 25.91 KG/M2

## 2019-09-30 PROBLEM — E86.0 ACUTE DEHYDRATION: Chronic | Status: RESOLVED | Noted: 2019-09-25 | Resolved: 2019-09-30

## 2019-09-30 PROBLEM — N30.01 ACUTE CYSTITIS WITH HEMATURIA: Chronic | Status: RESOLVED | Noted: 2019-09-24 | Resolved: 2019-09-30

## 2019-09-30 PROBLEM — D69.6 THROMBOCYTOPENIA (HCC): Chronic | Status: RESOLVED | Noted: 2019-08-01 | Resolved: 2019-09-30

## 2019-09-30 PROBLEM — G92.8 TOXIC METABOLIC ENCEPHALOPATHY: Chronic | Status: RESOLVED | Noted: 2019-09-24 | Resolved: 2019-09-30

## 2019-09-30 PROBLEM — K59.09 OTHER CONSTIPATION: Chronic | Status: RESOLVED | Noted: 2019-09-25 | Resolved: 2019-09-30

## 2019-09-30 PROBLEM — A41.9 SEPSIS DUE TO URINARY TRACT INFECTION (HCC): Chronic | Status: RESOLVED | Noted: 2019-09-25 | Resolved: 2019-09-30

## 2019-09-30 PROBLEM — N17.9 AKI (ACUTE KIDNEY INJURY) (HCC): Chronic | Status: RESOLVED | Noted: 2019-09-24 | Resolved: 2019-09-30

## 2019-09-30 PROBLEM — N39.0 SEPSIS DUE TO URINARY TRACT INFECTION (HCC): Chronic | Status: RESOLVED | Noted: 2019-09-25 | Resolved: 2019-09-30

## 2019-09-30 PROBLEM — E87.1 ACUTE HYPONATREMIA: Status: RESOLVED | Noted: 2019-09-28 | Resolved: 2019-09-30

## 2019-09-30 LAB
ANION GAP SERPL CALCULATED.3IONS-SCNC: 7 MMOL/L (ref 4–13)
BUN SERPL-MCNC: 28 MG/DL (ref 7–25)
CALCIUM SERPL-MCNC: 9 MG/DL (ref 8.6–10.5)
CHLORIDE SERPL-SCNC: 107 MMOL/L (ref 98–107)
CO2 SERPL-SCNC: 26 MMOL/L (ref 21–31)
CREAT SERPL-MCNC: 0.99 MG/DL (ref 0.7–1.3)
GFR SERPL CREATININE-BSD FRML MDRD: 87 ML/MIN/1.73SQ M
GLUCOSE SERPL-MCNC: 110 MG/DL (ref 65–140)
GLUCOSE SERPL-MCNC: 113 MG/DL (ref 65–140)
GLUCOSE SERPL-MCNC: 122 MG/DL (ref 65–99)
GLUCOSE SERPL-MCNC: 181 MG/DL (ref 65–140)
POTASSIUM SERPL-SCNC: 4.6 MMOL/L (ref 3.5–5.5)
SODIUM SERPL-SCNC: 140 MMOL/L (ref 134–143)

## 2019-09-30 PROCEDURE — 74018 RADEX ABDOMEN 1 VIEW: CPT

## 2019-09-30 PROCEDURE — 97110 THERAPEUTIC EXERCISES: CPT

## 2019-09-30 PROCEDURE — 82948 REAGENT STRIP/BLOOD GLUCOSE: CPT

## 2019-09-30 PROCEDURE — 97530 THERAPEUTIC ACTIVITIES: CPT

## 2019-09-30 PROCEDURE — 97535 SELF CARE MNGMENT TRAINING: CPT

## 2019-09-30 PROCEDURE — 80048 BASIC METABOLIC PNL TOTAL CA: CPT | Performed by: NURSE PRACTITIONER

## 2019-09-30 PROCEDURE — 97116 GAIT TRAINING THERAPY: CPT

## 2019-09-30 PROCEDURE — 99239 HOSP IP/OBS DSCHRG MGMT >30: CPT | Performed by: PHYSICIAN ASSISTANT

## 2019-09-30 RX ORDER — LACOSAMIDE 150 MG/1
150 TABLET ORAL EVERY 12 HOURS SCHEDULED
Qty: 60 TABLET | Refills: 0 | OUTPATIENT
Start: 2019-09-30 | End: 2019-10-25 | Stop reason: SDUPTHER

## 2019-09-30 RX ORDER — ACETAMINOPHEN 325 MG/1
650 TABLET ORAL EVERY 6 HOURS PRN
Start: 2019-09-30 | End: 2019-11-19 | Stop reason: SDUPTHER

## 2019-09-30 RX ORDER — AMLODIPINE BESYLATE 5 MG/1
5 TABLET ORAL ONCE
Status: COMPLETED | OUTPATIENT
Start: 2019-09-30 | End: 2019-09-30

## 2019-09-30 RX ORDER — LEVOTHYROXINE SODIUM 0.1 MG/1
100 TABLET ORAL
Status: CANCELLED
Start: 2019-10-01

## 2019-09-30 RX ORDER — BISACODYL 10 MG
10 SUPPOSITORY, RECTAL RECTAL DAILY PRN
Start: 2019-09-30 | End: 2020-07-13 | Stop reason: ALTCHOICE

## 2019-09-30 RX ADMIN — BISACODYL 10 MG: 10 SUPPOSITORY RECTAL at 08:03

## 2019-09-30 RX ADMIN — DOCUSATE SODIUM 100 MG: 100 CAPSULE, LIQUID FILLED ORAL at 08:02

## 2019-09-30 RX ADMIN — LISINOPRIL 10 MG: 10 TABLET ORAL at 08:02

## 2019-09-30 RX ADMIN — HYDROXYZINE HYDROCHLORIDE 25 MG: 25 TABLET ORAL at 08:02

## 2019-09-30 RX ADMIN — BENZTROPINE MESYLATE 0.5 MG: 1 TABLET ORAL at 08:03

## 2019-09-30 RX ADMIN — AMLODIPINE BESYLATE 5 MG: 5 TABLET ORAL at 04:09

## 2019-09-30 RX ADMIN — LEVOTHYROXINE SODIUM 100 MCG: 100 TABLET ORAL at 05:37

## 2019-09-30 RX ADMIN — INSULIN LISPRO 1 UNITS: 100 INJECTION, SOLUTION INTRAVENOUS; SUBCUTANEOUS at 07:44

## 2019-09-30 RX ADMIN — QUETIAPINE FUMARATE 150 MG: 50 TABLET, EXTENDED RELEASE ORAL at 08:03

## 2019-09-30 RX ADMIN — QUETIAPINE FUMARATE 50 MG: 50 TABLET, EXTENDED RELEASE ORAL at 08:02

## 2019-09-30 RX ADMIN — SODIUM CHLORIDE 150 MG: 9 INJECTION, SOLUTION INTRAVENOUS at 08:09

## 2019-09-30 NOTE — ASSESSMENT & PLAN NOTE
· As evidence by elevated white blood cell count of 12 90, hyponatremia, tachycardia, acute kidney injury  · Patient also with kidney stones  · S/P IV Rocephin  · Continue IV fluids  · Significant improvement, white count returned to normal, hyponatremia resolved  · Resolved  · Patient with Sepsis due to hypovolemia

## 2019-09-30 NOTE — ASSESSMENT & PLAN NOTE
· Appears to be chronic issue for the patient and is followed as an outpatient  · Monitor blood counts  · Resolved

## 2019-09-30 NOTE — NURSING NOTE
Care of patient assumed by self at this time  Patient resting comfortably at this; no acute distress  no seizure activity noted

## 2019-09-30 NOTE — NURSING NOTE
Review of VS demonstrated an elevated SBP  SBP rechecked and was essentially unchanged  Sarah KATZ made aware of same  New order was received  Will monitor

## 2019-09-30 NOTE — DISCHARGE SUMMARY
Discharge- [facility] 1965, 48 y o  male MRN: [id number]    Unit/Bed#: -01 Encounter: [id number]    Primary Care Provider: HUNTER Tinajero   Date and time admitted to hospital: 9/24/2019 12:01 PM        ADRIANA (acute kidney injury) (HCC)resolved as of 9/30/2019  Assessment & Plan  · Unclear etiology possibly due to dehydration  · Check urine studies-normal  · Check renal ultrasound - no hydronephrosis, positive left renal calculi  · Follow-up CT abdomen pelvis - bilateral renal stones question pyelonephritis, extensive fecal material   · Consult Nephrology - most likely secondary to dehydration due to medications  · Kidney functions are and improving with IV fluids  · S/P IV fluids  · Significant improvement  · 09/27/2019 BUN and creatinine are 55 and 1 36   · 9/28/2019: 41/ 1 00  · resolved    Generalized convulsive epilepsy ([facility] [address] )  Assessment & Plan  · Valproic acid level noted to be elevated, will hold for now  · Zonegran held as well in light of acute kidney injury  · Neurology recommended changes:  Discontinue Depakote, Zonegran, Neurontin and start Vimpat  · Neurology ordered EEG - No focal abnormalities nor interictal epileptiform discharges were noted  No electrographic seizures occurred during this recording  · There is consideration to decrease Seroquel dose as well - has not been done as of 09/27/2019  · No signs and symptoms of seizure activity    Sepsis due to urinary tract infection (HCC)resolved as of 9/30/2019  Assessment & Plan  · As evidence by elevated white blood cell count of 12 90, hyponatremia, tachycardia, acute kidney injury  · Patient also with kidney stones  · S/P IV Rocephin  · Continue IV fluids  · Significant improvement, white count returned to normal, hyponatremia resolved  · Resolved  · Patient with Sepsis due to hypovolemia      Toxic metabolic encephalopathyresolved as of 9/30/2019  Assessment & Plan  · Likely due to above-mentioned medical problems  · Since medications have been discontinued patient is more awake and alert than he had been the day prior  · EEG on 9/26/19 with no focal abnormalities nor interictal epileptiform discharges were noted  No electrographic seizures occurred during the recording  · Patient is awake and alert and oriented, speaking more and able to hold conversation  · S/P IV fluids  · Medications that were discontinued include Depakote, zonegram, Neurontin  Vimpat was started  · Resolved     Other specified hypothyroidism  Assessment & Plan  · Continue Synthroid  · Last TSH from 6 weeks ago was within normal limits    Essential hypertension  Assessment & Plan  · Lisinopril resumed, monitor  · Continue norvasc    Type 2 diabetes mellitus without complication, without long-term current use of insulin [facility])  Assessment & Plan  Lab Results   Component Value Date    HGBA1C 5 8 08/28/2019       Recent Labs     09/29/19  1556 09/29/19  2054 09/30/19  0647 09/30/19  1056   POCGLU 194* 161* 181* 110       Blood Sugar Average: Last 72 hrs:  (P) 958 [id number]   · Metformin on hold, will resume at discharge  · Sliding scale insulin while in hospital    Acute hyponatremiaresolved as of 9/30/2019  Assessment & Plan  · Due to hypovolemia from ADRIANA  · Treated with IV fluids  · This is currently resolved    Acute dehydrationresolved as of 9/30/2019  Assessment & Plan  · Due to hypovolemia  · Unsure is due to UTI or Kidney stones  · S/P IVFs  · Monitor labs    · Resolved     Acute cystitis with hematuriaresolved as of 9/30/2019  Assessment & Plan  · Present on admission  · S/P Ceftriaxone   · Urine culture - No growth     Thrombocytopenia (HCC)resolved as of 9/30/2019  Assessment & Plan  · Appears to be chronic issue for the patient and is followed as an outpatient  · Monitor blood counts  · Resolved    * Other constipationresolved as of 9/30/2019  Assessment & Plan  · Per CT abdomen and pelvis: Extensive fecal stasis does raise the possibility of fecal impaction should be correlated with any recent bowel movement  · Patient has had bowel movements - continue with daily suppository, patient had mineral oil enema on the evening of 09/26/2019  · KUB in morning hours of 09/27/2019 showed: There is a nonobstructive bowel gas pattern  Significant stool retention throughout the colon  Nephrolithiasis  · Continue suppositories prn at discharge    Discharging Physician / Practitioner: Zoya Burgess PA-C  PCP: Ferdinand Miguel  Admission Date:   Admission Orders (From admission, onward)     Ordered        09/24/19 1451  Inpatient Admission (expected length of stay for this patient Order details is greater than two midnights)  Once                   Discharge Date: 09/30/19    Disposition:      Other: Home with [address] [facility] [address] [facility] to [facility] SNF:   · Not Applicable to this Patient - Not Applicable to this Patient    Reason for Admission: Acute encephalopathy/ADRIANA    Discharge Diagnoses:     Please see assessment and plan section above for further details regarding discharge diagnoses       Resolved Problems  Date Reviewed: 9/30/2019          Resolved    Thrombocytopenia ([facility] [address] ) 9/30/2019     Resolved by  Zoya Burgess PA-C    ADRIANA (acute kidney injury) ([facility] [address] ) 9/30/2019     Resolved by  Zoya Burgess PA-C    Acute cystitis with hematuria 9/30/2019     Resolved by  Zoya Burgess PA-C    Toxic metabolic encephalopathy 3/05/6092     Resolved by  Zoya Burgess PA-C    Sepsis due to urinary tract infection ([facility] [address] ) 9/30/2019     Resolved by  Zoya Burgess PA-C    * (Principal) Other constipation 9/30/2019     Resolved by  Zoya Burgess PA-C    Acute dehydration 9/30/2019     Resolved by  Zoya Burgess PA-C    Acute hyponatremia 9/30/2019     Resolved by  Zoya Burgess, [facility] Stay:  · Nephrology  · Urology  · Neurology    Procedures Performed:   · EEG on 9/26/19- No focal abnormalities nor interictal epileptiform discharges were noted  No electrographic seizures occurred during this recording  Medication Adjustments and Discharge Medications:  · Summary of Medication Adjustments made as a result of this hospitalization: depakote, neurontin, and zonegran discontinued per neurology recommendations and patient started on vimpat, ibuprofen discontinued d/t ADRIANA  · Medication Dosing Tapers - Please refer to Discharge Medication List for details on any medication dosing tapers (if applicable to patient)  · Medications being temporarily held (include recommended restart time): N/A  · Discharge Medication List: See after visit summary for reconciled discharge medications  Wound Care Recommendations:  When applicable, please see wound care section of After Visit Summary  Diet Recommendations at Discharge:  Diet -        Diet Orders   (From admission, onward)             Start     Ordered    09/24/19 1550  Diet Jeremie/CHO Controlled; Consistent Carbohydrate Diet Level 2 (5 carb servings/75 grams CHO/meal)  Diet effective now     Question Answer Comment   Diet Type Jeremie/CHO Controlled    Jeremie/CHO Controlled Consistent Carbohydrate Diet Level 2 (5 carb servings/75 grams CHO/meal)    RD to adjust diet per protocol? Yes        09/24/19 1549                Instructions for any Catheters / Lines Present at Discharge (including removal date, if applicable): N/A    Significant Findings / Test Results:     XR abdomen 1 view kub   Final Result by David Villanueva DO (09/30 1322)      Stable calculi projecting over the midportion of the left kidney  Workstation performed: [id number]         XR abdomen 1 view kub   Final Result by Addi Bradely MD (09/27 9720)         There is a nonobstructive bowel gas pattern  Significant stool retention throughout the colon  Nephrolithiasis                    Workstation performed: [id number]          kidney and bladder   Final Result by Skylar Abdi MD (09/25 9715)      No hydronephrosis  Multiple left renal calculi  Workstation performed: [id number]         CT abdomen pelvis wo contrast   Final Result by Alexey Irwin MD (09/24 7132)      Bilateral renal calculi without hydronephrosis or hydroureter  There is no bladder dilatation  Perinephric stranding is seen bilaterally and is nonspecific though occasionally pyelonephritis can present this way  Extensive fecal stasis does raise the possibility of fecal impaction should be correlated with any recent bowel movement  No obvious bowel wall thickening or inflammatory changes elsewhere  Immediate finding was noted in the Epic system  Workstation performed: [id number]         CT head without contrast   Final Result by Bennett Whipple MD (09/24 1439)      No acute intracranial abnormality  Precocious volume loss disproportionate within the cerebellum  Workstation performed: [id number]         XR chest 1 view portable   Final Result by Yamileth Bowen DO (09/24 1608)   Diminished lung volumes  Bibasilar atelectasis  No lobar consolidation or large effusion  Workstation performed: [id number]               Incidental Findings:   · None     Test Results Pending at Discharge (will require follow up): · Stone analysis     Outpatient Tests Requested:  · BMP in 1 week    Complications:    · None    Hospital Course:     Siva Wylie is a 48 y o  male patient who originally presented to the hospital on 9/24/2019 due to worsening mental status and fatigue  Patient was noted by his caregiver to have decreased appetite, fatigue, and very little urine output  Patient was found to have ADRIANA on admission with a Cr of 3 91  Patient was seen in consultation by nephrology during his hospitalization who felt that patient's ADRIANA was due to UTI and volume depletion  Patient was hydrated with IVFs during his hospitalization   His lisinopril was initially held but was able to be restarted prior to discharge  Patient also had a chapman catheter placed during admission that was discontinued prior to discharge  Patient was voiding without difficulty prior to discharge  Patient was seen in consultation by urology during his hospitalization due to kidney stones and urinary retention  It was felt by urology that no immediate intervention was necessary as patient's kidney stones were nonobstructing  Patient had a kidney stone sent for analysis on 9/28/19, the results for which are still pending  Patient did receive a course of ceftriaxone during his hospitalization for suspected UTI, however, this was discontinued after his urine culture came back with no growth  Neurology was consulted during admission due to acute encephalopathy  It was recommended by neurology that patient's zonegran be discontinued given his kidney stones, his depakote be discontinued due to worsening thrombocytopenia, and his neurontin be discontinued given his ADRIANA since it can add to encephalopathy  Patient's seroquel dose was not changed during admission as patient's kidney function and mental status improved  Patient was started on vimpat without difficulty during his hospitalization  Patient had an EEG on 9/26/19 which showed no focal abnormalities nor interictal epileptiform discharges were noted  No electrographic seizures occurred during this recording  Patient was found to be very constipated during admission  He had a mineral oil enema during admission as well as daily suppositories  Patient will continue a bowel regimen at discharge  Patient was initially felt to need STR by PT/OT  As patient's mental status improved, he had improvement in ambulation  It was felt by patient's caregiver that he was ambulating near baseline at time of discharge  Patient's caregiver and sister would prefer patient not be placed in SNF  Patient will return to his group home with home PT/home health care      Condition at Discharge: stable     Discharge Day Visit / Exam:     Subjective:  Patient awake and alert  Appetite good  No complaints  Vitals: Blood Pressure: 140/62 (09/30/19 1420)  Pulse: 88 (09/30/19 1420)  Temperature: 98 4 °F (36 9 °C) (09/30/19 1420)  Temp Source: Temporal (09/30/19 1420)  Respirations: 18 (09/30/19 1420)  Height: 5' 10" (177 8 cm) (09/25/19 1451)  Weight - Scale: 82 1 kg (181 lb) (09/30/19 0600)  SpO2: 95 % (09/30/19 1420)  Exam:   Physical Exam   Constitutional: No distress  HENT:   Head: Normocephalic and atraumatic  Mouth/Throat: Oropharynx is clear and moist    Neck: Neck supple  Cardiovascular: Normal rate and regular rhythm  Pulmonary/Chest: Effort normal and breath sounds normal  No respiratory distress  He has no wheezes  Abdominal: Soft  Bowel sounds are normal  He exhibits no distension  There is no tenderness  Musculoskeletal: He exhibits no edema  Lymphadenopathy:     He has no cervical adenopathy  Neurological: He is alert  Skin: Skin is warm and dry  No erythema  Discussion with Family: Discussed with patient's caregiver at bedside    Goals of Care Discussions:  · Code Status at Discharge: Level 1 - Full Code  · Were there any Goals of Care Discussions during Hospitalization?: No  · Results of any General Goals of Care Discussions: N/A   · POLST Completed: No   · If POLST Completed, Summary of POLST Agreement Provided Here: N/A   · OK to Rehospitalize if Needed? Yes    Discharge instructions/Information to patient and family:   See after visit summary section titled Discharge Instructions for information provided to patient and family  Planned Readmission: None      Discharge Statement:  I spent greater than 30 minutes discharging the patient  This time was spent on the day of discharge  I had direct contact with the patient on the day of discharge   Greater than 50% of the total time was spent examining patient, answering all patient questions, arranging and discussing plan of care with patient as well as directly providing post-discharge instructions  Additional time then spent on discharge activities      ** Please Note: This note has been constructed using a voice recognition system **

## 2019-09-30 NOTE — ASSESSMENT & PLAN NOTE
· Likely due to above-mentioned medical problems  · Since medications have been discontinued patient is more awake and alert than he had been the day prior  · EEG on 9/26/19 with no focal abnormalities nor interictal epileptiform discharges were noted  No electrographic seizures occurred during the recording  · Patient is awake and alert and oriented, speaking more and able to hold conversation  · S/P IV fluids  · Medications that were discontinued include Depakote, zonegram, Neurontin    Vimpat was started  · Resolved

## 2019-09-30 NOTE — PLAN OF CARE
Problem: Prexisting or High Potential for Compromised Skin Integrity  Goal: Skin integrity is maintained or improved  Description  INTERVENTIONS:  - Identify patients at risk for skin breakdown  - Assess and monitor skin integrity  - Assess and monitor nutrition and hydration status  - Monitor labs   - Assess for incontinence   - Turn and reposition patient  - Assist with mobility/ambulation  - Relieve pressure over bony prominences  - Avoid friction and shearing  - Provide appropriate hygiene as needed including keeping skin clean and dry  - Evaluate need for skin moisturizer/barrier cream  - Collaborate with interdisciplinary team   - Patient/family teaching  - Consider wound care consult   Outcome: Progressing     Problem: Potential for Falls  Goal: Patient will remain free of falls  Description  INTERVENTIONS:  - Assess patient frequently for physical needs  -  Identify cognitive and physical deficits and behaviors that affect risk of falls    -  [place] fall precautions as indicated by assessment   - Educate patient/family on patient safety including physical limitations  - Instruct patient to call for assistance with activity based on assessment  - Modify environment to reduce risk of injury  - Consider OT/PT consult to assist with strengthening/mobility  Outcome: Progressing     Problem: PAIN - ADULT  Goal: Verbalizes/displays adequate comfort level or baseline comfort level  Description  Interventions:  - Encourage patient to monitor pain and request assistance  - Assess pain using appropriate pain scale  - Administer analgesics based on type and severity of pain and evaluate response  - Implement non-pharmacological measures as appropriate and evaluate response  - Consider cultural and social influences on pain and pain management  - Notify physician/advanced practitioner if interventions unsuccessful or patient reports new pain  Outcome: Progressing     Problem: INFECTION - ADULT  Goal: Absence or prevention of progression during hospitalization  Description  INTERVENTIONS:  - Assess and monitor for signs and symptoms of infection  - Monitor lab/diagnostic results  - Monitor all insertion sites, i e  indwelling lines, tubes, and drains  - Monitor endotracheal if appropriate and nasal secretions for changes in amount and color  - [place] appropriate cooling/warming therapies per order  - Administer medications as ordered  - Instruct and encourage patient and family to use good hand hygiene technique  - Identify and instruct in appropriate isolation precautions for identified infection/condition  Outcome: Progressing     Problem: SAFETY ADULT  Goal: Maintain or return to baseline ADL function  Description  INTERVENTIONS:  -  Assess patient's ability to carry out ADLs; assess patient's baseline for ADL function and identify physical deficits which impact ability to perform ADLs (bathing, care of mouth/teeth, toileting, grooming, dressing, etc )  - Assess/evaluate cause of self-care deficits   - Assess range of motion  - Assess patient's mobility; develop plan if impaired  - Assess patient's need for assistive devices and provide as appropriate  - Encourage maximum independence but intervene and supervise when necessary  - Involve family in performance of ADLs  - Assess for home care needs following discharge   - Consider OT consult to assist with ADL evaluation and planning for discharge  - Provide patient education as appropriate  Outcome: Progressing  Goal: Maintain or return mobility status to optimal level  Description  INTERVENTIONS:  - Assess patient's baseline mobility status (ambulation, transfers, stairs, etc )    - Identify cognitive and physical deficits and behaviors that affect mobility  - Identify mobility aids required to assist with transfers and/or ambulation (gait belt, sit-to-stand, lift, walker, cane, etc )  - [place] fall precautions as indicated by assessment  - Record patient progress and toleration of activity level on Mobility SBAR; progress patient to next Phase/Stage  - Instruct patient to call for assistance with activity based on assessment  - Consider rehabilitation consult to assist with strengthening/weightbearing, etc   Outcome: Progressing     Problem: DISCHARGE PLANNING  Goal: Discharge to home or other facility with appropriate resources  Description  INTERVENTIONS:  - Identify barriers to discharge w/patient and caregiver  - Arrange for needed discharge resources and transportation as appropriate  - Identify discharge learning needs (meds, wound care, etc )  - Arrange for interpretive services to assist at discharge as needed  - Refer to Case Management Department for coordinating discharge planning if the patient needs post-hospital services based on physician/advanced practitioner order or complex needs related to functional status, cognitive ability, or social support system  Outcome: Progressing     Problem: Knowledge Deficit  Goal: Patient/family/caregiver demonstrates understanding of disease process, treatment plan, medications, and discharge instructions  Description  Complete learning assessment and assess knowledge base    Interventions:  - Provide teaching at level of understanding  - Provide teaching via preferred learning methods  Outcome: Progressing     Problem: NEUROSENSORY - ADULT  Goal: Remains free of injury related to seizures activity  Description  INTERVENTIONS  - Maintain airway, patient safety  and administer oxygen as ordered  - Monitor patient for seizure activity, document and report duration and description of seizure to physician/advanced practitioner  - If seizure occurs,  ensure patient safety during seizure  - Reorient patient post seizure  - Seizure pads on all 4 side rails  - Instruct patient/family to notify RN of any seizure activity including if an aura is experienced  - Instruct patient/family to call for assistance with activity based on nursing assessment  - Administer anti-seizure medications if ordered    Outcome: Progressing     Problem: GASTROINTESTINAL - ADULT  Goal: Minimal or absence of nausea and/or vomiting  Description  INTERVENTIONS:  - Administer IV fluids if ordered to ensure adequate hydration  - Maintain NPO status until nausea and vomiting are resolved  - Nasogastric tube if ordered  - Administer ordered antiemetic medications as needed  - Provide nonpharmacologic comfort measures as appropriate  - Advance diet as tolerated, if ordered  - Consider nutrition services referral to assist patient with adequate nutrition and appropriate food choices  Outcome: Progressing  Goal: Maintains or returns to baseline bowel function  Description  INTERVENTIONS:  - Assess bowel function  - Encourage oral fluids to ensure adequate hydration  - Administer IV fluids if ordered to ensure adequate hydration  - Administer ordered medications as needed  - Encourage mobilization and activity  - Consider nutritional services referral to assist patient with adequate nutrition and appropriate food choices  Outcome: Progressing  Goal: Maintains adequate nutritional intake  Description  INTERVENTIONS:  - Monitor percentage of each meal consumed  - Identify factors contributing to decreased intake, treat as appropriate  - Assist with meals as needed  - Monitor I&O, weight, and lab values if indicated  - Obtain nutrition services referral as needed  Outcome: Progressing     Problem: GENITOURINARY - ADULT  Goal: Maintains or returns to baseline urinary function  Description  INTERVENTIONS:  - Assess urinary function  - Encourage oral fluids to ensure adequate hydration if ordered  - Administer IV fluids as ordered to ensure adequate hydration  - Administer ordered medications as needed  - Offer frequent toileting  - Follow urinary retention protocol if ordered  Outcome: Progressing  Goal: Absence of urinary retention  Description  INTERVENTIONS:  - Assess patients ability to void and empty bladder  - Monitor I/O  - Bladder scan as needed  - Discuss with physician/AP medications to alleviate retention as needed  - Discuss catheterization for long term situations as appropriate  Outcome: Progressing     Problem: METABOLIC, FLUID AND ELECTROLYTES - ADULT  Goal: Glucose maintained within target range  Description  INTERVENTIONS:  - Monitor Blood Glucose as ordered  - Assess for signs and symptoms of hyperglycemia and hypoglycemia  - Administer ordered medications to maintain glucose within target range  - Assess nutritional intake and initiate nutrition service referral as needed  Outcome: Progressing     Problem: SKIN/TISSUE INTEGRITY - ADULT  Goal: Skin integrity remains intact  Description  INTERVENTIONS  - Identify patients at risk for skin breakdown  - Assess and monitor skin integrity  - Assess and monitor nutrition and hydration status  - Monitor labs (i e  albumin)  - Assess for incontinence   - Turn and reposition patient  - Assist with mobility/ambulation  - Relieve pressure over bony prominences  - Avoid friction and shearing  - Provide appropriate hygiene as needed including keeping skin clean and dry  - Evaluate need for skin moisturizer/barrier cream  - Collaborate with interdisciplinary team (i e  Nutrition, Rehabilitation, etc )   - Patient/family teaching  Outcome: Progressing     Problem: MUSCULOSKELETAL - ADULT  Goal: Maintain or return mobility to safest level of function  Description  INTERVENTIONS:  - Assess patient's ability to carry out ADLs; assess patient's baseline for ADL function and identify physical deficits which impact ability to perform ADLs (bathing, care of mouth/teeth, toileting, grooming, dressing, etc )  - Assess/evaluate cause of self-care deficits   - Assess range of motion  - Assess patient's mobility  - Assess patient's need for assistive devices and provide as appropriate  - Encourage maximum independence but intervene and supervise when necessary  - Involve family in performance of ADLs  - Assess for home care needs following discharge   - Consider OT consult to assist with ADL evaluation and planning for discharge  - Provide patient education as appropriate  Outcome: Progressing     Problem: DISCHARGE PLANNING - CARE MANAGEMENT  Goal: Discharge to post-acute care or home with appropriate resources  Description  INTERVENTIONS:  - Conduct assessment to determine patient/family and health care team treatment goals, and need for post-acute services based on payer coverage, community resources, and patient preferences, and barriers to discharge  - Address psychosocial, clinical, and financial barriers to discharge as identified in assessment in conjunction with the patient/family and health care team  - Arrange appropriate level of post-acute services according to patient's   needs and preference and payer coverage in collaboration with the physician and health care team  - Communicate with and update the patient/family, physician, and health care team regarding progress on the discharge plan  - Arrange appropriate transportation to post-acute venues  Pt will go home with life share CG who will provide transportation     Outcome: Progressing

## 2019-09-30 NOTE — SOCIAL WORK
Ev[facility] from [facility] here to evaluate the pt  PT wants to go home, CG indicated he is at his baseline and he will not need to  Go to a SNF  [facility]  PT was recommending STR  Pt did get up and wlak 50 feet  Lonita Neighbours PA also indicated the pt would be [facility]  A post acute care recommendation was made by your care team for [address]  Discussed Freedom of Choice with both patient and caregiver  List of agencies given to both patient and caregiver via in person  both patient and caregiver aware the list is custom filtered for them by zip code location and that [facility] post acute providers are designated  PT CG chose [facility]  A referral was made for same  Faxed AVS to [facility]  CG will transport home

## 2019-09-30 NOTE — OCCUPATIONAL THERAPY NOTE
09/30/19 1400   Restrictions/Precautions   Weight Bearing Precautions Per Order No   Other Precautions Impulsive;Cognitive; Fall Risk   General   Family/Caregiver Present caregiver present - patient seems to respond to continual encouragement of this caregiver   Lifestyle   Intrinsic Gratification enjoying football game on hospital room TV   Pain Assessment   Pain Assessment No/denies pain   ADL   Where Assessed Edge of bed   Eating Comments caregiver reports that she got patient to feed himself today    UB Bathing Comments follows step by step directions to complete same    LB Bathing Comments follows step by step directions to complete same    LB Dressing Comments able to doff/don socks    Toileting Assistance  4  Minimal Assistance  (for bedpan use)   Toileting Deficit Setup;Verbal cueing; Increased time to complete;Use of bedpan/urinal setup   Toileting Comments patient able to bridge to have bedpan put underneath him    Bed Mobility   Additional Comments please see PT note  (provided cues for technique and safety )   Transfers   Additional Comments please see PT note  (provided cues for technique and safety )   Coordination   Gross Motor WFL   Cognition   Overall Cognitive Status Impaired   Arousal/Participation Responsive  (needs much encouragement and re-direction)   Following Commands Follows one step commands inconsistently   Activity Tolerance   Activity Tolerance Patient tolerated treatment well   Assessment   Assessment Patient participated in Skilled OT session this date with interventions consisting of safety awareness and fall prevention techniques,  therapeutic activities to: increase activity tolerance and increase dynamic sit/ stand balance during functional activity (dressing activity)  Patient agreeable to OT treatment session, upon arrival patient was found semi-reclined in bed  In comparison to previous session, patient with improvements in level of alertness, energy and participation  Patient requiring frequent re direction, verbal cues for correct technique, one step directives and ocassional safety reminders  Patient continues to be functioning below baseline level, occupational performance remains limited secondary to factors listed above and increased risk for falls and injury  From OT standpoint, recommendation at time of d/c would be STR vs Home Health Agency/ Home OT services pending progress  Patient to benefit from continued Occupational Therapy treatment while in the hospital to address deficits as defined above and maximize level of functional independence with ADLs and functional mobility  Plan   Treatment Interventions ADL retraining;Functional transfer training; Activityengagement   Goal Expiration Date 10/05/19   OT Treatment Day [address], [place]AGAT/L

## 2019-09-30 NOTE — PHYSICAL THERAPY NOTE
09/30/19 1404   Pain Assessment   Pain Assessment No/denies pain   Pain Score No Pain   Restrictions/Precautions   Weight Bearing Precautions Per Order No   Other Precautions Cognitive; Impulsive; Fall Risk   General   Chart Reviewed Yes   Response to Previous Treatment Patient unable to report, no changes reported from family or staff   Family/Caregiver Present Yes   Cognition   Overall Cognitive Status Impaired   Arousal/Participation Responsive; Cooperative   Attention Attends with cues to redirect   Orientation Level Oriented to person   Memory Unable to assess   Following Commands Follows one step commands inconsistently   Bed Mobility   Rolling R 7  Independent   Additional items Verbal cues   Rolling L 7  Independent   Additional items Verbal cues   Supine to Sit 4  Minimal assistance   Additional items Assist x 1; Impulsive;LE management;Verbal cues   Sit to Supine 4  Minimal assistance   Additional items LE management;Assist x 1; Increased time required;Verbal cues   Transfers   Sit to Stand 4  Minimal assistance   Additional items Assist x 2;Impulsive;Verbal cues   Stand to Sit 4  Minimal assistance   Additional items Assist x 2;Impulsive;Verbal cues   Ambulation/Elevation   Gait pattern Improper Weight shift; Antalgic;Narrow GORGE; Decreased foot clearance;Shuffling; Inconsistent ivonne; Ataxia   Gait Assistance 4  Minimal assist   Additional items Assist x 2;Verbal cues   Assistive Device Standard walker   Distance   (50 ft)   Balance   Static Sitting Poor +   Dynamic Sitting Poor   Static Standing Poor   Dynamic Standing Poor -   Endurance Deficit   Endurance Deficit Yes   Activity Tolerance   Activity Tolerance Patient limited by fatigue;Treatment limited secondary to medical complications (Comment)   Exercises   Hip Flexion Sitting;20 reps;AROM; Bilateral   Hip Abduction Sitting;20 reps;AROM; Bilateral   Hip Adduction Sitting;20 reps;AROM; Bilateral   Knee AROM Long Arc Quad Sitting;20 reps;AROM; Bilateral Ankle Pumps Sitting;20 reps;AROM; Bilateral   Assessment   Prognosis Fair   Problem List Decreased strength;Decreased range of motion;Decreased endurance; Impaired balance;Decreased mobility; Decreased coordination;Decreased cognition; Impaired judgement;Decreased safety awareness   Assessment Pt seen for PT treatment session this date with interventions consisting of gait training w/ emphasis on improving pt's ability to ambulate level surfaces x 50 ft with min A provided by therapist with standard Walker and Therapeutic exercise consisting of: AROM 20 reps B LE in sitting position  Pt agreeable to PT treatment session upon arrival, pt found supine in bed w/ HOB elevated, in no apparent distress and responsive  In comparison to previous session, pt with improvements in ability to ambulate  Post session: pt returned BTB and all needs in reach Continue to recommend STR at time of d/c in order to maximize pt's functional independence and safety w/ mobility  Pt continues to be functioning below baseline level, and remains limited 2* factors listed above and including decreased strength, endurance, coordination and balance    PT will continue to see pt while here in order to address the deficits listed above and provide interventions consistent w/ POC in effort to achieve STGs  Barriers to Discharge Inaccessible home environment   Goals   Patient Goals go home   PT Treatment Day 3   Plan   Treatment/Interventions Functional transfer training;LE strengthening/ROM; Bed mobility;Gait training; Endurance training; Therapeutic exercise;Cognitive reorientation   Progress Slow progress, cognitive deficits   PT Frequency Other (Comment)  (3-5x/wk)   Recommendation   Recommendation Short-term skilled PT   Equipment Recommended Walker   PT - OK to Discharge Yes   Additional Comments upon conclusion, patient back in bed with caregiver present   [address], PTA

## 2019-09-30 NOTE — ASSESSMENT & PLAN NOTE
· Unclear etiology possibly due to dehydration  · Check urine studies-normal  · Check renal ultrasound - no hydronephrosis, positive left renal calculi  · Follow-up CT abdomen pelvis - bilateral renal stones question pyelonephritis, extensive fecal material   · Consult Nephrology - most likely secondary to dehydration due to medications  · Kidney functions are and improving with IV fluids  · S/P IV fluids  · Significant improvement  · 09/27/2019 BUN and creatinine are 55 and 1 36   · 9/28/2019: 41/ 1 00  · resolved

## 2019-09-30 NOTE — DISCHARGE INSTRUCTIONS
Acute Kidney Injury   WHAT YOU NEED TO KNOW:   Acute kidney injury (ADRIANA) is also called acute kidney failure, or acute renal failure  ADRIANA happens when your kidneys suddenly stop working correctly  Normally, the kidneys remove fluid, chemicals, and waste from your blood  These wastes are turned into urine by your kidneys  ADRIANA usually happens over hours or days  When you have ADRIANA, your kidneys do not remove the waste, chemicals, or extra fluid from your body  A normal amount of urine is not produced  ADRIANA is usually temporary, it can take days to months to recover  ADRIANA can also become a chronic kidney condition  DISCHARGE INSTRUCTIONS:   Call 911 if:   · You have sudden chest pain or trouble breathing  Seek care immediately if:   · Your symptoms get worse  Contact your healthcare provider if:   · Your symptoms return  · Your blood sugar or blood pressure level is not within the range your healthcare provider recommends  · You have questions or concerns about your condition or care  Nutrition:  Your healthcare provider may tell you to eat food low in sodium (salt), potassium, phosphorus, or protein  A dietitian can help you plan your meals  Drink liquids as directed: Your healthcare provider may recommend that you drink a certain amount of liquids  This will help your kidneys work better and decrease your risk for dehydration  Ask how much liquid to drink each day and which liquids are best for you  Prevent acute kidney injury:   · Manage other health conditions  such as diabetes, high blood pressure, or heart disease  These conditions increase your risk for acute kidney injury  Take your medicines for these conditions as directed  Also, monitor your blood sugar and blood pressure levels as directed  Contact your healthcare provider if your levels are not in the range he or she says it should be  · Talk to your healthcare provider before you take over-the-counter-medicine    NSAIDs, stomach medicine, or laxatives may harm your kidneys and increase your risk for acute kidney injury  If it is okay to take the medicine, follow the directions on the package  Do not take more than directed  · Tell healthcare providers you have had acute kidney injury  before you get contrast liquid for an x-ray or CT scan  Your healthcare provider may give you medicine to prevent kidney problems caused by the liquid  Follow up with your healthcare provider as directed: You will need to return for more tests to make sure your kidneys are working properly  You may also be referred to a kidney specialist  Write down your questions so you remember to ask them during your visits  © 2017 [address] Information is for End User's use only and may not be sold, redistributed or otherwise used for commercial purposes  All illustrations and images included in CareNotes® are the copyrighted property of [employer]  or Pranav Sánchez  The above information is an  only  It is not intended as medical advice for individual conditions or treatments  Talk to your doctor, nurse or pharmacist before following any medical regimen to see if it is safe and effective for you  Urinary Tract Infection in Men, Ambulatory Care   GENERAL INFORMATION:   A urinary tract infection (UTI)  is caused by bacteria that get inside your urinary tract  Most bacteria that enter your urinary tract are expelled when you urinate  If the bacteria stay in your urinary tract, you may get an infection  Your urinary tract includes your kidneys, ureters, bladder, and urethra  Urine is made in your kidneys, and it flows from the ureters to the bladder  Urine leaves the bladder through the urethra  A UTI is more common in your lower urinary tract, which includes your bladder and urethra         Common symptoms include the following:   · Urinating more often or waking from sleep to urinate    · Pain or burning when you urinate    · Pain or pressure in your lower abdomen    · Urine that smells bad    · Leaking urine  Seek immediate care for the following symptoms:   · Urinating very little or not at all    · Vomiting    · Shaking chills with a fever    · Side or back pain that gets worse  Treatment for a UTI  may include medicines to treat a bacterial infection  You may also need medicines to decrease pain and burning, or decrease the urge to urinate often  Prevent a UTI:   · Urinate when you feel the urge  Do not hold your urine  Urinate as soon as you feel you have to  · Drink liquids as directed  Ask how much liquid to drink each day and which liquids are best for you  You may need to drink more fluids than usual to help flush out the bacteria  Do not drink alcohol, caffeine, and citrus juices  These can irritate your bladder and increase your symptoms  · Apply heat  on your abdomen for 20 to 30 minutes every 2 hours for as many days as directed  Heat helps decrease discomfort and pressure in your bladder  Follow up with your healthcare provider as directed:  Write down your questions so you remember to ask them during your visits  CARE AGREEMENT:   You have the right to help plan your care  Learn about your health condition and how it may be treated  Discuss treatment options with your caregivers to decide what care you want to receive  You always have the right to refuse treatment  The above information is an  only  It is not intended as medical advice for individual conditions or treatments  Talk to your doctor, nurse or pharmacist before following any medical regimen to see if it is safe and effective for you  © 2014 [address] is for End User's use only and may not be sold, redistributed or otherwise used for commercial purposes  All illustrations and images included in CareNotes® are the copyrighted property of A D [employer] M [employer]  or [employer]        Kidney Stones   WHAT YOU NEED TO KNOW:   Kidney stones form in the urinary system when the water and waste in your urine are out of balance  When this happens, certain types of waste crystals separate from the urine  The crystals build up and form kidney stones  You may have 1 or more kidney stones  DISCHARGE INSTRUCTIONS:   Seek care immediately:   · You have vomiting that is not relieved by medicine  Contact your healthcare provider if:   · You have a fever  · You have trouble passing urine  · You see blood in your urine  · You have severe pain  · You have any questions or concerns about your condition or care  Medicines:   · NSAIDs , such as ibuprofen, help decrease swelling, pain, and fever  This medicine is available with or without a doctor's order  NSAIDs can cause stomach bleeding or kidney problems in certain people  If you take blood thinner medicine, always ask your healthcare provider if NSAIDs are safe for you  Always read the medicine label and follow directions  · Prescription medicine  may be given  Ask how to take this medicine safely  · Medicines  to balance your electrolytes may be needed  · Take your medicine as directed  Contact your healthcare provider if you think your medicine is not helping or if you have side effects  Tell him or her if you are allergic to any medicine  Keep a list of the medicines, vitamins, and herbs you take  Include the amounts, and when and why you take them  Bring the list or the pill bottles to follow-up visits  Carry your medicine list with you in case of an emergency  Follow up with your healthcare provider as directed: You may need to return for more tests  Write down your questions so you remember to ask them during your visits  Self-care:   · Drink plenty of liquids  Your healthcare provider may tell you to drink at least 8 to 12 (eight-ounce) cups of liquids each day  This helps flush out the kidney stones when you urinate   Water is the best liquid to drink  · Strain your urine every time you go to the bathroom  Urinate through a strainer or a piece of thin cloth to catch the stones  Take the stones to your healthcare provider so they can be sent to the lab for tests  This will help your healthcare providers plan the best treatment for you  · Eat a variety of healthy foods  Healthy foods include fruits, vegetables, whole-grain breads, low-fat dairy products, beans, and fish  You may need to limit how much sodium (salt) or protein you eat  Ask for information about the best foods for you  · Stay active  Your stones may pass more easily by if you stay active  Ask about the best activities for you  After you pass your kidney stones:  Once you have passed your kidney stones, your healthcare provider may  order a 24-hour urine test  Results from a 24-hour urine test will help your healthcare provider plan ways to prevent more stones from forming  If you are told to do a 24-hour test, your healthcare provider will give you more instructions  © 2017 [address] [facility] Information is for End User's use only and may not be sold, redistributed or otherwise used for commercial purposes  All illustrations and images included in CareNotes® are the copyrighted property of A [employer] M [employer]  or Pranav Sánchez  The above information is an  only  It is not intended as medical advice for individual conditions or treatments  Talk to your doctor, nurse or pharmacist before following any medical regimen to see if it is safe and effective for you

## 2019-09-30 NOTE — PLAN OF CARE
Problem: PHYSICAL THERAPY ADULT  Goal: Performs mobility at highest level of function for planned discharge setting  See evaluation for individualized goals  Description  Treatment/Interventions: Functional transfer training, LE strengthening/ROM, Therapeutic exercise, Endurance training, Bed mobility, Gait training, Continued evaluation, Spoke to nursing, OT     See flowsheet documentation for full assessment, interventions and recommendations  Sara Jacques PT     Outcome: Progressing  Note:   Prognosis: Fair  Problem List: Decreased strength, Decreased range of motion, Decreased endurance, Impaired balance, Decreased mobility, Decreased coordination, Decreased cognition, Impaired judgement, Decreased safety awareness  Assessment: Pt seen for PT treatment session this date with interventions consisting of gait training w/ emphasis on improving pt's ability to ambulate level surfaces x 50 ft with min A provided by therapist with standard Walker and Therapeutic exercise consisting of: AROM 20 reps B LE in sitting position  Pt agreeable to PT treatment session upon arrival, pt found supine in bed w/ HOB elevated, in no apparent distress and responsive  In comparison to previous session, pt with improvements in ability to ambulate  Post session: pt returned BTB and all needs in reach Continue to recommend STR at time of d/c in order to maximize pt's functional independence and safety w/ mobility  Pt continues to be functioning below baseline level, and remains limited 2* factors listed above and including decreased strength, endurance, coordination and balance    PT will continue to see pt while here in order to address the deficits listed above and provide interventions consistent w/ POC in effort to achieve STGs  Barriers to Discharge: Inaccessible home environment     Recommendation: Short-term skilled PT     PT - OK to Discharge: Yes  Armida Samy Feeling, PTA    See flowsheet documentation for full assessment

## 2019-09-30 NOTE — ASSESSMENT & PLAN NOTE
· Due to hypovolemia  · Unsure is due to UTI or Kidney stones  · S/P IVFs  · Monitor labs    · Resolved

## 2019-09-30 NOTE — ASSESSMENT & PLAN NOTE
Lab Results   Component Value Date    HGBA1C 5 8 08/28/2019       Recent Labs     09/29/19  1556 09/29/19 2054 09/30/19  0647 09/30/19  1056   POCGLU 194* 161* 181* 110       Blood Sugar Average: Last 72 hrs:  (P) 422 [id number]   · Metformin on hold, will resume at discharge  · Sliding scale insulin while in hospital

## 2019-09-30 NOTE — ASSESSMENT & PLAN NOTE
· Valproic acid level noted to be elevated, will hold for now  · Zonegran held as well in light of acute kidney injury  · Neurology recommended changes:  Discontinue Depakote, Zonegran, Neurontin and start Vimpat  · Neurology ordered EEG - No focal abnormalities nor interictal epileptiform discharges were noted  No electrographic seizures occurred during this recording    · There is consideration to decrease Seroquel dose as well - has not been done as of 09/27/2019  · No signs and symptoms of seizure activity

## 2019-09-30 NOTE — PLAN OF CARE
Problem: OCCUPATIONAL THERAPY ADULT  Goal: Performs self-care activities at highest level of function for planned discharge setting  See evaluation for individualized goals  Description  Treatment Interventions: ADL retraining, Functional transfer training, UE strengthening/ROM, Endurance training, Cognitive reorientation, Patient/family training, Equipment evaluation/education, Neuromuscular reeducation, Compensatory technique education, Continued evaluation, Activityengagement          See flowsheet documentation for full assessment, interventions and recommendations  Outcome: Progressing, slowly  Note:   Limitation: Decreased ADL status, Decreased UE ROM, Decreased UE strength, Decreased cognition, Decreased endurance, Decreased self-care trans, Decreased high-level ADLs  Prognosis: Fair  Assessment: Patient participated in Skilled OT session this date with interventions consisting of safety awareness and fall prevention techniques,  therapeutic activities to: increase activity tolerance and increase dynamic sit/ stand balance during functional activity (dressing activity)  Patient agreeable to OT treatment session, upon arrival patient was found semi-reclined in bed  In comparison to previous session, patient with improvements in level of alertness, energy and participation  Patient requiring frequent re direction, verbal cues for correct technique, one step directives and ocassional safety reminders  Patient continues to be functioning below baseline level, occupational performance remains limited secondary to factors listed above and increased risk for falls and injury  From OT standpoint, recommendation at time of d/c would be STR vs Home Health Agency/ Home OT services pending progress  Patient to benefit from continued Occupational Therapy treatment while in the hospital to address deficits as defined above and maximize level of functional independence with ADLs and functional mobility        OT Discharge Recommendation: Short Term Rehab  OT - OK to Discharge: Yes(Once medically cleared)  OLAYINKA Castro/JANEL

## 2019-10-01 ENCOUNTER — TELEPHONE (OUTPATIENT)
Dept: NEUROLOGY | Facility: CLINIC | Age: 54
End: 2019-10-01

## 2019-10-01 ENCOUNTER — EPISODE CHANGES (OUTPATIENT)
Dept: CASE MANAGEMENT | Facility: HOSPITAL | Age: 54
End: 2019-10-01

## 2019-10-01 NOTE — TELEPHONE ENCOUNTER
Pt's caregiver Felix Manning called and states that pt was discharged form [facility] yesterday  States that pt has been taking depakote, gabapentin and zonisamide for many years but these meds were discontinued at the hospital    States that this morning, pt woke up at 0400, pt was irritable, he started cursing "he told me to leave him alone"  Denies any pain  States that pt appeared depressed  "He does not want to eat or drink, he does not want to do anything"  Denies SI/HI  New meds:  vimpat 150 mg 1 tab bid  States that she is unsure if pt is experiencing side effects or need additional meds  Pt has HFU appt scheduled on 10/3019 at 1:30 pm w/ Dr Riley Robbins to contact pt's pcp and psychiatrist  She verbalized understanding  Any other recommendation?          [phone] ok to leave detailed message

## 2019-10-01 NOTE — TELEPHONE ENCOUNTER
She should speak to her family physician or psychiatrist, I do not have any other recommendations since have not seen the patient

## 2019-10-04 ENCOUNTER — APPOINTMENT (EMERGENCY)
Dept: RADIOLOGY | Facility: HOSPITAL | Age: 54
End: 2019-10-04
Payer: MEDICARE

## 2019-10-04 ENCOUNTER — PATIENT OUTREACH (OUTPATIENT)
Dept: CASE MANAGEMENT | Facility: OTHER | Age: 54
End: 2019-10-04

## 2019-10-04 ENCOUNTER — HOSPITAL ENCOUNTER (EMERGENCY)
Facility: HOSPITAL | Age: 54
Discharge: HOME | End: 2019-10-05
Attending: EMERGENCY MEDICINE | Admitting: EMERGENCY MEDICINE
Payer: MEDICARE

## 2019-10-04 DIAGNOSIS — G40.919 BREAKTHROUGH SEIZURE (HCC): ICD-10-CM

## 2019-10-04 DIAGNOSIS — R56.9 SEIZURE (HCC): Primary | ICD-10-CM

## 2019-10-04 LAB
ALBUMIN SERPL BCP-MCNC: 3 G/DL (ref 3.5–5)
ALP SERPL-CCNC: 107 U/L (ref 46–116)
ALT SERPL W P-5'-P-CCNC: 82 U/L (ref 12–78)
ANION GAP SERPL CALCULATED.3IONS-SCNC: 10 MMOL/L (ref 4–13)
APTT PPP: 39 SECONDS (ref 23–37)
AST SERPL W P-5'-P-CCNC: 40 U/L (ref 5–45)
BASE EX.OXY STD BLDV CALC-SCNC: 77.2 % (ref 60–80)
BASE EXCESS BLDV CALC-SCNC: -1.3 MMOL/L
BASOPHILS # BLD AUTO: 0.07 THOUSANDS/ΜL (ref 0–0.1)
BASOPHILS NFR BLD AUTO: 1 % (ref 0–1)
BILIRUB SERPL-MCNC: 0.2 MG/DL (ref 0.2–1)
BUN SERPL-MCNC: 25 MG/DL (ref 5–25)
CALCIUM SERPL-MCNC: 9.6 MG/DL (ref 8.3–10.1)
CHLORIDE SERPL-SCNC: 104 MMOL/L (ref 100–108)
CO2 SERPL-SCNC: 26 MMOL/L (ref 21–32)
CREAT SERPL-MCNC: 0.96 MG/DL (ref 0.6–1.3)
EOSINOPHIL # BLD AUTO: 0.36 THOUSAND/ΜL (ref 0–0.61)
EOSINOPHIL NFR BLD AUTO: 3 % (ref 0–6)
ERYTHROCYTE [DISTWIDTH] IN BLOOD BY AUTOMATED COUNT: 13.6 % (ref 11.6–15.1)
GFR SERPL CREATININE-BSD FRML MDRD: 91 ML/MIN/1.73SQ M
GLUCOSE SERPL-MCNC: 151 MG/DL (ref 65–140)
HCO3 BLDV-SCNC: 23.4 MMOL/L (ref 24–30)
HCT VFR BLD AUTO: 31.9 % (ref 36.5–49.3)
HGB BLD-MCNC: 10.8 G/DL (ref 12–17)
IMM GRANULOCYTES # BLD AUTO: 0.05 THOUSAND/UL (ref 0–0.2)
IMM GRANULOCYTES NFR BLD AUTO: 0 % (ref 0–2)
INR PPP: 0.96 (ref 0.84–1.19)
LACTATE SERPL-SCNC: 2.1 MMOL/L (ref 0.5–2)
LIPASE SERPL-CCNC: 386 U/L (ref 73–393)
LYMPHOCYTES # BLD AUTO: 2.2 THOUSANDS/ΜL (ref 0.6–4.47)
LYMPHOCYTES NFR BLD AUTO: 18 % (ref 14–44)
MAGNESIUM SERPL-MCNC: 1.5 MG/DL (ref 1.6–2.6)
MCH RBC QN AUTO: 32.9 PG (ref 26.8–34.3)
MCHC RBC AUTO-ENTMCNC: 33.9 G/DL (ref 31.4–37.4)
MCV RBC AUTO: 97 FL (ref 82–98)
MONOCYTES # BLD AUTO: 1.27 THOUSAND/ΜL (ref 0.17–1.22)
MONOCYTES NFR BLD AUTO: 10 % (ref 4–12)
NEUTROPHILS # BLD AUTO: 8.22 THOUSANDS/ΜL (ref 1.85–7.62)
NEUTS SEG NFR BLD AUTO: 68 % (ref 43–75)
NRBC BLD AUTO-RTO: 0 /100 WBCS
NT-PROBNP SERPL-MCNC: 261 PG/ML
O2 CT BLDV-SCNC: 12.5 ML/DL
PCO2 BLDV: 39.6 MM HG (ref 42–50)
PH BLDV: 7.39 [PH] (ref 7.3–7.4)
PLATELET # BLD AUTO: 504 THOUSANDS/UL (ref 149–390)
PMV BLD AUTO: 10.1 FL (ref 8.9–12.7)
PO2 BLDV: 46 MM HG (ref 35–45)
POTASSIUM SERPL-SCNC: 4 MMOL/L (ref 3.5–5.3)
PROT SERPL-MCNC: 7.6 G/DL (ref 6.4–8.2)
PROTHROMBIN TIME: 12.7 SECONDS (ref 11.6–14.5)
RBC # BLD AUTO: 3.28 MILLION/UL (ref 3.88–5.62)
SODIUM SERPL-SCNC: 140 MMOL/L (ref 136–145)
TROPONIN I SERPL-MCNC: <0.02 NG/ML
WBC # BLD AUTO: 12.17 THOUSAND/UL (ref 4.31–10.16)

## 2019-10-04 PROCEDURE — 83880 ASSAY OF NATRIURETIC PEPTIDE: CPT | Performed by: EMERGENCY MEDICINE

## 2019-10-04 PROCEDURE — 71045 X-RAY EXAM CHEST 1 VIEW: CPT

## 2019-10-04 PROCEDURE — 84484 ASSAY OF TROPONIN QUANT: CPT | Performed by: EMERGENCY MEDICINE

## 2019-10-04 PROCEDURE — 82805 BLOOD GASES W/O2 SATURATION: CPT | Performed by: EMERGENCY MEDICINE

## 2019-10-04 PROCEDURE — 83605 ASSAY OF LACTIC ACID: CPT | Performed by: EMERGENCY MEDICINE

## 2019-10-04 PROCEDURE — 80053 COMPREHEN METABOLIC PANEL: CPT | Performed by: EMERGENCY MEDICINE

## 2019-10-04 PROCEDURE — 93005 ELECTROCARDIOGRAM TRACING: CPT

## 2019-10-04 PROCEDURE — 36415 COLL VENOUS BLD VENIPUNCTURE: CPT | Performed by: EMERGENCY MEDICINE

## 2019-10-04 PROCEDURE — 85730 THROMBOPLASTIN TIME PARTIAL: CPT | Performed by: EMERGENCY MEDICINE

## 2019-10-04 PROCEDURE — 84145 PROCALCITONIN (PCT): CPT | Performed by: EMERGENCY MEDICINE

## 2019-10-04 PROCEDURE — 96361 HYDRATE IV INFUSION ADD-ON: CPT

## 2019-10-04 PROCEDURE — 87040 BLOOD CULTURE FOR BACTERIA: CPT | Performed by: EMERGENCY MEDICINE

## 2019-10-04 PROCEDURE — 83735 ASSAY OF MAGNESIUM: CPT | Performed by: EMERGENCY MEDICINE

## 2019-10-04 PROCEDURE — 99285 EMERGENCY DEPT VISIT HI MDM: CPT

## 2019-10-04 PROCEDURE — 85025 COMPLETE CBC W/AUTO DIFF WBC: CPT | Performed by: EMERGENCY MEDICINE

## 2019-10-04 PROCEDURE — 96374 THER/PROPH/DIAG INJ IV PUSH: CPT

## 2019-10-04 PROCEDURE — 83690 ASSAY OF LIPASE: CPT | Performed by: EMERGENCY MEDICINE

## 2019-10-04 PROCEDURE — 85610 PROTHROMBIN TIME: CPT | Performed by: EMERGENCY MEDICINE

## 2019-10-04 RX ORDER — 0.9 % SODIUM CHLORIDE 0.9 %
3 VIAL (ML) INJECTION AS NEEDED
Status: DISCONTINUED | OUTPATIENT
Start: 2019-10-04 | End: 2019-10-05 | Stop reason: HOSPADM

## 2019-10-04 RX ORDER — LORAZEPAM 2 MG/ML
2 INJECTION INTRAMUSCULAR ONCE
Status: COMPLETED | OUTPATIENT
Start: 2019-10-04 | End: 2019-10-04

## 2019-10-04 RX ADMIN — SODIUM CHLORIDE 1000 ML: 0.9 INJECTION, SOLUTION INTRAVENOUS at 23:46

## 2019-10-04 RX ADMIN — LORAZEPAM 2 MG: 2 INJECTION INTRAMUSCULAR; INTRAVENOUS at 22:41

## 2019-10-04 RX ADMIN — SODIUM CHLORIDE 1000 ML: 0.9 INJECTION, SOLUTION INTRAVENOUS at 22:50

## 2019-10-04 NOTE — PROGRESS NOTES
[facility] to verify that patient is receiving home care services  Spoke with Luanne Leiva who stated that agency was scheduled to do home assessment  Received call from "wife" who had to reschedule due to patient becoming violent  Will follow up with agency at a later time

## 2019-10-04 NOTE — PROGRESS NOTES
Reached out to patient's caregiver, Jt Pastor to for hospital follow up, assess patient's well being and introduce bundle program    Family medicine note on 10/1;   "Pt's caregiver Cleveland Duarte called and states that pt was discharged form [facility] yesterday  States that pt has been taking depakote, gabapentin and zonisamide for many years but these meds were discontinued at the hospital    States that this morning, pt woke up at 0400, pt was irritable, he started cursing "he told me to leave him alone"  Denies any pain  States that pt appeared depressed  "He does not want to eat or drink, he does not want to do anything"  Denies SI/HI  New meds:  vimpat 150 mg 1 tab bid  States that she is unsure if pt is experiencing side effects or need additional meds  Pt has HFU appt scheduled on 10/3019 at 1:30 pm w/ Dr Arauz Gone to contact pt's pcp and psychiatrist  She verbalized understanding  Any other recommendation? edicine note on 10/1; "     Left message with this CM contact information and to return call

## 2019-10-05 ENCOUNTER — APPOINTMENT (EMERGENCY)
Dept: CT IMAGING | Facility: HOSPITAL | Age: 54
End: 2019-10-05
Payer: MEDICARE

## 2019-10-05 VITALS
SYSTOLIC BLOOD PRESSURE: 150 MMHG | DIASTOLIC BLOOD PRESSURE: 76 MMHG | TEMPERATURE: 97.5 F | HEIGHT: 70 IN | BODY MASS INDEX: 25.97 KG/M2 | OXYGEN SATURATION: 98 % | RESPIRATION RATE: 20 BRPM | HEART RATE: 60 BPM

## 2019-10-05 LAB
BACTERIA UR QL AUTO: ABNORMAL /HPF
BILIRUB UR QL STRIP: NEGATIVE
CLARITY UR: CLEAR
COLOR UR: YELLOW
GLUCOSE UR STRIP-MCNC: NEGATIVE MG/DL
HGB UR QL STRIP.AUTO: ABNORMAL
KETONES UR STRIP-MCNC: NEGATIVE MG/DL
LACTATE SERPL-SCNC: 0.9 MMOL/L (ref 0.5–2)
LEUKOCYTE ESTERASE UR QL STRIP: NEGATIVE
NITRITE UR QL STRIP: NEGATIVE
NON-SQ EPI CELLS URNS QL MICRO: ABNORMAL /HPF
PH UR STRIP.AUTO: 5.5 [PH]
PROCALCITONIN SERPL-MCNC: 0.11 NG/ML
PROT UR STRIP-MCNC: NEGATIVE MG/DL
RBC #/AREA URNS AUTO: ABNORMAL /HPF
SP GR UR STRIP.AUTO: 1.01 (ref 1–1.03)
UROBILINOGEN UR QL STRIP.AUTO: 0.2 E.U./DL
WBC #/AREA URNS AUTO: ABNORMAL /HPF

## 2019-10-05 PROCEDURE — 81001 URINALYSIS AUTO W/SCOPE: CPT | Performed by: EMERGENCY MEDICINE

## 2019-10-05 PROCEDURE — 36415 COLL VENOUS BLD VENIPUNCTURE: CPT | Performed by: EMERGENCY MEDICINE

## 2019-10-05 PROCEDURE — 99284 EMERGENCY DEPT VISIT MOD MDM: CPT | Performed by: EMERGENCY MEDICINE

## 2019-10-05 PROCEDURE — 70450 CT HEAD/BRAIN W/O DYE: CPT

## 2019-10-05 PROCEDURE — 83605 ASSAY OF LACTIC ACID: CPT | Performed by: EMERGENCY MEDICINE

## 2019-10-05 NOTE — ED NOTES
Pt agitation and uncontrollable movements inhibit ECG reading  Provider notified and will reattempts when pt is able to stay still        DOUGLAS Hall  10/04/19 [id number]

## 2019-10-05 NOTE — ED PROVIDER NOTES
History  Chief Complaint   Patient presents with    Seizure - Prior Hx Of     Per EMS pt had seizure lasting 3 mintues  PT does have a hx of seizures  Per care givers pt was recently hospitalized for sepsis  PT co abdominal pain and a nausea  48year old male patient presents emergency department for evaluation of the single breakthrough seizure  The patient has not had breakthrough seizure in 10 years  Currently the patient is has baseline normal mental status  The patient will be evaluated with a differential diagnosis to include but not be limited to recurrent seizures, intracranial abnormality, urinary tract infection  History provided by:  Caregiver   used: No    Seizure - Prior Hx Of   Seizure activity on arrival: no    Seizure type: Focal  Preceding symptoms: no sensation of an aura present and no hyperventilation    Initial focality:  None  Episode characteristics: abnormal movements    Episode characteristics: no combativeness and no confusion    Postictal symptoms: confusion    Context: not change in medication, not emotional upset, not family hx of seizures, not flashing visual stimuli, not intracranial lesion and not intracranial shunt        Prior to Admission Medications   Prescriptions Last Dose Informant Patient Reported? Taking?    QUEtiapine (SEROquel XR) 200 mg 24 hr tablet   No Yes   Sig: TAKE 1 TABLET BY MOUTH TWICE A DAY (8AM,2PM)   QUEtiapine (SEROquel XR) 400 mg 24 hr tablet   No Yes   Sig: TAKE 1 TABLET BY MOUTH AT BEDTIME (8PM) (DX: ANTIPSYCHOTIC)   acetaminophen (TYLENOL) 325 mg tablet   No Yes   Sig: Take 2 tablets (650 mg total) by mouth every 6 (six) hours as needed for fever   benztropine (COGENTIN) 0 5 mg tablet   No Yes   Sig: TAKE 1 TABLET BY MOUTH TWICE A DAY (8AM,8PM)   bisacodyl (DULCOLAX) 10 mg suppository   No Yes   Sig: Insert 1 suppository (10 mg total) into the rectum daily as needed for constipation   calcium carbonate (OYSTER SHELL,OSCAL) 500 mg   No Yes   Sig: Take 1 tablet by mouth 3 (three) times a day   cholecalciferol (VITAMIN D3) 1,000 units tablet  Other (Specify) No Yes   Sig: Take 1 tablet (1,000 Units total) by mouth daily   Patient taking differently: Take 1,000 Units by mouth every other day    cyanocobalamin (VITAMIN B-12) 1,000 mcg tablet   No Yes   Sig: TAKE 1 TABLET BY MOUTH EVERY OTHER DAY (8 AM)   docusate sodium (COLACE) 100 mg capsule   No Yes   Sig: TAKE 1 CAPSULE BY MOUTH TWICE A DAY (8AM,8PM)   hydrOXYzine pamoate (VISTARIL) 25 mg capsule   No Yes   Sig: TAKE 2 CAPSULES (50MG) BY MOUTH 3 TIMES A DAY (8AM,2PM,8PM)   ketoconazole (NIZORAL) 2 % shampoo   No Yes   Sig: Wash hair and sideburns at least 3 times per week   lacosamide (VIMPAT) 150 mg tablet   No Yes   Sig: Take 1 tablet (150 mg total) by mouth every 12 (twelve) hours   levothyroxine 125 mcg tablet  Other (Specify) No Yes   Sig: TAKE 1 TABLET BY MOUTH DAILY (8AM)   Patient taking differently: 125 mcg daily    lisinopril (ZESTRIL) 10 mg tablet   No Yes   Sig: TAKE 1 TABLET BY MOUTH DAILY (8AM)   loratadine (ALLERGY) 10 mg tablet   No Yes   Sig: Take 1 tablet (10 mg total) by mouth daily Take 1 tablet by mouth daily PRN   metFORMIN (GLUCOPHAGE) 1000 MG tablet   No Yes   Sig: Take 1 tablet (1,000 mg total) by mouth 2 (two) times a day with meals   multivitamin (THERAGRAN) TABS   No Yes   Sig: Take 1 tablet by mouth daily   simvastatin (ZOCOR) 40 mg tablet   No Yes   Sig: Take 1 tablet (40 mg total) by mouth daily      Facility-Administered Medications: None       Past Medical History:   Diagnosis Date    ADRIANA [facility]acute kidney injury) ([facility] [address] ) 9/24/2019    [facility]re[facility]     Disease of thyroid gland     Hypertension     Kidney failure     Osteoporosis     Psychiatric disorder     Scoliosis     Seizures (HCC)     UTI (urinary tract infection)        Past Surgical History:   Procedure Laterality Date    APPENDECTOMY         History reviewed   No pertinent family history  I have reviewed and agree with the history as documented  Social History     Tobacco Use    Smoking status: Never Smoker    Smokeless tobacco: Never Used   Substance Use Topics    Alcohol use: Never     Frequency: Never    Drug use: No        Review of Systems   All other systems reviewed and are negative  Physical Exam  Physical Exam   Constitutional: He is oriented to person, place, and time  He appears well-developed and well-nourished  No distress  HENT:   Head: Normocephalic and atraumatic  Right Ear: External ear normal    Left Ear: External ear normal    Eyes: Conjunctivae and EOM are normal  Right eye exhibits no discharge  Left eye exhibits no discharge  No scleral icterus  Neck: Normal range of motion  Neck supple  No JVD present  No tracheal deviation present  No thyromegaly present  Cardiovascular: Normal rate and regular rhythm  Pulmonary/Chest: Effort normal and breath sounds normal  No stridor  No respiratory distress  He has no wheezes  He has no rales  Abdominal: Soft  Bowel sounds are normal  He exhibits no distension  There is no tenderness  Musculoskeletal: Normal range of motion  He exhibits no edema, tenderness or deformity  Neurological: He is alert and oriented to person, place, and time  No cranial nerve deficit  Coordination normal    Skin: Skin is warm and dry  He is not diaphoretic  Psychiatric: He has a normal mood and affect  His behavior is normal    Nursing note and vitals reviewed        Vital Signs  ED Triage Vitals [10/04/19 2211]   Temperature Pulse Respirations Blood Pressure SpO2   97 5 °F (36 4 °C) 77 20 134/98 98 %      Temp Source Heart Rate Source Patient Position - Orthostatic VS BP Location FiO2 (%)   Oral Monitor Sitting Right arm --      Pain Score       --           Vitals:    10/04/19 2211 10/04/19 2345 10/05/19 0045   BP: 134/98 126/63 154/76   Pulse: 77 64 66   Patient Position - Orthostatic VS: Sitting           Visual Acuity      ED Medications  Medications   sodium chloride (PF) 0 9 % injection 3 mL (has no administration in time range)   sodium chloride 0 9 % bolus 1,000 mL (0 mL Intravenous Stopped 10/4/19 2346)     Followed by   sodium chloride 0 9 % bolus 1,000 mL (0 mL Intravenous Stopped 10/5/19 0044)   LORazepam (ATIVAN) 2 mg/mL injection 2 mg (2 mg Intravenous Given 10/4/19 2241)       Diagnostic Studies  Results Reviewed     Procedure Component Value Units Date/Time    Lactate Blood [[id number]]  (Normal) Collected:  10/05/19 0040    Lab Status:  Final result Specimen:  Blood from Arm, Left Updated:  10/05/19 0113     LACTIC ACID 0 9 mmol/L     Narrative:       Result may be elevated if tourniquet was used during collection  Lactate Blood [[id number]]  (Abnormal) Collected:  10/04/19 2248    Lab Status:  Final result Specimen:  Blood from Arm, Right Updated:  10/04/19 2341     LACTIC ACID 2 1 mmol/L     Narrative:       Result may be elevated if tourniquet was used during collection      Lipase [[id number]]  (Normal) Collected:  10/04/19 2248    Lab Status:  Final result Specimen:  Blood from Arm, Right Updated:  10/04/19 2332     Lipase 386 u/L     B-type natriuretic peptide [[id number]]  (Abnormal) Collected:  10/04/19 2248    Lab Status:  Final result Specimen:  Blood from Arm, Right Updated:  10/04/19 2332     NT-proBNP 261 pg/mL     Magnesium [[id number]]  (Abnormal) Collected:  10/04/19 2248    Lab Status:  Final result Specimen:  Blood from Arm, Right Updated:  10/04/19 2332     Magnesium 1 5 mg/dL     Troponin I [[id number]]  (Normal) Collected:  10/04/19 2248    Lab Status:  Final result Specimen:  Blood from Arm, Right Updated:  10/04/19 2327     Troponin I <0 02 ng/mL     Comprehensive metabolic panel [[id number]]  (Abnormal) Collected:  10/04/19 2248    Lab Status:  Final result Specimen:  Blood from Arm, Right Updated:  10/04/19 2325     Sodium 140 mmol/L      Potassium 4 0 mmol/L      Chloride 104 mmol/L CO2 26 mmol/L      ANION GAP 10 mmol/L      BUN 25 mg/dL      Creatinine 0 96 mg/dL      Glucose 151 mg/dL      Calcium 9 6 mg/dL      AST 40 U/L      ALT 82 U/L      Alkaline Phosphatase 107 U/L      Total Protein 7 6 g/dL      Albumin 3 0 g/dL      Total Bilirubin 0 20 mg/dL      eGFR 91 ml/min/1 73sq m     Narrative:       [place] guidelines for Chronic Kidney Disease (CKD):     Stage 1 with normal or high GFR (GFR > 90 mL/min/1 73 square meters)    Stage 2 Mild CKD (GFR = 60-89 mL/min/1 73 square meters)    Stage 3A Moderate CKD (GFR = 45-59 mL/min/1 73 square meters)    Stage 3B Moderate CKD (GFR = 30-44 mL/min/1 73 square meters)    Stage 4 Severe CKD (GFR = 15-29 mL/min/1 73 square meters)    Stage 5 End Stage CKD (GFR <15 mL/min/1 73 square meters)  Note: GFR calculation is accurate only with a steady state creatinine    Protime-INR [[id number]]  (Normal) Collected:  10/04/19 2248    Lab Status:  Final result Specimen:  Blood from Arm, Right Updated:  10/04/19 2324     Protime 12 7 seconds      INR 0 96    APTT [[id number]]  (Abnormal) Collected:  10/04/19 2248    Lab Status:  Final result Specimen:  Blood from Arm, Right Updated:  10/04/19 2324     PTT 39 seconds     Blood gas, venous [[id number]]  (Abnormal) Collected:  10/04/19 2248    Lab Status:  Final result Specimen:  Blood from Arm, Right Updated:  10/04/19 2308     pH, Raghavendra 7 390     pCO2, Raghavendra 39 6 mm Hg      pO2, Raghavendra 46 0 mm Hg      HCO3, Raghavendra 23 4 mmol/L      Base Excess, Raghavendra -1 3 mmol/L      O2 Content, Raghavendra 12 5 ml/dL      O2 HGB, VENOUS 77 2 %     CBC and differential [[id number]]  (Abnormal) Collected:  10/04/19 2248    Lab Status:  Final result Specimen:  Blood from Arm, Right Updated:  10/04/19 2306     WBC 12 17 Thousand/uL      RBC 3 28 Million/uL      Hemoglobin 10 8 g/dL      Hematocrit 31 9 %      MCV 97 fL      MCH 32 9 pg      MCHC 33 9 g/dL      RDW 13 6 %      MPV 10 1 fL      Platelets 652 Thousands/uL nRBC 0 /100 WBCs      Neutrophils Relative 68 %      Immat GRANS % 0 %      Lymphocytes Relative 18 %      Monocytes Relative 10 %      Eosinophils Relative 3 %      Basophils Relative 1 %      Neutrophils Absolute 8 22 Thousands/µL      Immature Grans Absolute 0 05 Thousand/uL      Lymphocytes Absolute 2 20 Thousands/µL      Monocytes Absolute 1 27 Thousand/µL      Eosinophils Absolute 0 36 Thousand/µL      Basophils Absolute 0 07 Thousands/µL     Procalcitonin [[id number]] Collected:  10/04/19 2248    Lab Status: In process Specimen:  Blood from Arm, Right Updated:  10/04/19 2303    Blood culture [[id number]] Collected:  10/04/19 2248    Lab Status: In process Specimen:  Blood from Arm, Right Updated:  10/04/19 2303    Blood culture [[id number]] Collected:  10/04/19 2248    Lab Status:   In process Specimen:  Blood from Arm, Left Updated:  10/04/19 2303    Urinalysis with culture and sensitivity reflex [[id number]]     Lab Status:  No result Specimen:  Urine                  CT head without contrast   Final Result by Diane Finn DO (10/05 0113)      No evidence of large acute intracranial hemorrhage                  Workstation performed: [id number]         XR chest portable    (Results Pending)              Procedures  Procedures       ED Course                               MDM  Number of Diagnoses or Management Options  Breakthrough seizure [facility]): new and requires workup  [facility]): new and requires workup     Amount and/or Complexity of Data Reviewed  Clinical lab tests: ordered and reviewed  Tests in the radiology section of CPT®: reviewed and ordered  Decide to obtain previous medical records or to obtain history from someone other than the patient: yes  Review and summarize past medical records: yes    Patient Progress  Patient progress: stable      Disposition  Final diagnoses:   None     ED Disposition     None      Follow-up Information    None         Patient's Medications   Discharge Prescriptions No medications on file     No discharge procedures on file      ED Provider  Electronically Signed by           Charly Montana DO  10/05/19 0871

## 2019-10-07 LAB
ATRIAL RATE: 68 BPM
P AXIS: 45 DEGREES
PR INTERVAL: 164 MS
QRS AXIS: 30 DEGREES
QRSD INTERVAL: 154 MS
QT INTERVAL: 456 MS
QTC INTERVAL: 484 MS
T WAVE AXIS: 94 DEGREES
VENTRICULAR RATE: 68 BPM

## 2019-10-07 PROCEDURE — 93010 ELECTROCARDIOGRAM REPORT: CPT | Performed by: INTERNAL MEDICINE

## 2019-10-08 ENCOUNTER — HOSPITAL ENCOUNTER (EMERGENCY)
Facility: HOSPITAL | Age: 54
Discharge: HOME | End: 2019-10-08
Attending: EMERGENCY MEDICINE
Payer: MEDICARE

## 2019-10-08 ENCOUNTER — TELEPHONE (OUTPATIENT)
Dept: NEUROLOGY | Facility: CLINIC | Age: 54
End: 2019-10-08

## 2019-10-08 VITALS
HEART RATE: 90 BPM | BODY MASS INDEX: 25.91 KG/M2 | SYSTOLIC BLOOD PRESSURE: 164 MMHG | WEIGHT: 181 LBS | DIASTOLIC BLOOD PRESSURE: 102 MMHG | HEIGHT: 70 IN | TEMPERATURE: 97.2 F | OXYGEN SATURATION: 95 % | RESPIRATION RATE: 16 BRPM

## 2019-10-08 DIAGNOSIS — G40.909 SEIZURE DISORDER (HCC): Primary | ICD-10-CM

## 2019-10-08 DIAGNOSIS — I10 ESSENTIAL HYPERTENSION: Chronic | ICD-10-CM

## 2019-10-08 DIAGNOSIS — G40.919 BREAKTHROUGH SEIZURE (HCC): ICD-10-CM

## 2019-10-08 LAB
ALBUMIN SERPL BCP-MCNC: 3.6 G/DL (ref 3.5–5.7)
ALP SERPL-CCNC: 85 U/L (ref 40–150)
ALT SERPL W P-5'-P-CCNC: 44 U/L (ref 7–52)
ANION GAP SERPL CALCULATED.3IONS-SCNC: 7 MMOL/L (ref 4–13)
APTT PPP: 23 SECONDS (ref 23–37)
AST SERPL W P-5'-P-CCNC: 20 U/L (ref 13–39)
BASOPHILS # BLD AUTO: 0.1 THOUSANDS/ΜL (ref 0–0.1)
BASOPHILS NFR BLD AUTO: 2 % (ref 0–2)
BILIRUB SERPL-MCNC: 0.2 MG/DL (ref 0.2–1)
BILIRUB UR QL STRIP: NEGATIVE
BUN SERPL-MCNC: 26 MG/DL (ref 7–25)
CA PHOS MFR STONE: 15 %
CALCIUM OXALATE DIHYDRATE MFR STONE IR: 5 %
CALCIUM SERPL-MCNC: 9.4 MG/DL (ref 8.6–10.5)
CHLORIDE SERPL-SCNC: 106 MMOL/L (ref 98–107)
CLARITY UR: CLEAR
CO2 SERPL-SCNC: 29 MMOL/L (ref 21–31)
COLOR STONE: NORMAL
COLOR UR: YELLOW
COM MFR STONE: 80 %
COMMENT-STONE3: NORMAL
COMPOSITION: NORMAL
CREAT SERPL-MCNC: 0.8 MG/DL (ref 0.7–1.3)
EOSINOPHIL # BLD AUTO: 0.2 THOUSAND/ΜL (ref 0–0.61)
EOSINOPHIL NFR BLD AUTO: 2 % (ref 0–5)
ERYTHROCYTE [DISTWIDTH] IN BLOOD BY AUTOMATED COUNT: 14.1 % (ref 11.5–14.5)
GFR SERPL CREATININE-BSD FRML MDRD: 103 ML/MIN/1.73SQ M
GLUCOSE SERPL-MCNC: 140 MG/DL (ref 65–99)
GLUCOSE UR STRIP-MCNC: NEGATIVE MG/DL
HCT VFR BLD AUTO: 31.3 % (ref 42–47)
HGB BLD-MCNC: 10.5 G/DL (ref 14–18)
HGB UR QL STRIP.AUTO: NEGATIVE
INR PPP: 1.05 (ref 0.9–1.5)
KETONES UR STRIP-MCNC: NEGATIVE MG/DL
LABORATORY COMMENT REPORT: NORMAL
LEUKOCYTE ESTERASE UR QL STRIP: NEGATIVE
LYMPHOCYTES # BLD AUTO: 2.2 THOUSANDS/ΜL (ref 0.6–4.47)
LYMPHOCYTES NFR BLD AUTO: 29 % (ref 21–51)
MCH RBC QN AUTO: 32.5 PG (ref 26–34)
MCHC RBC AUTO-ENTMCNC: 33.5 G/DL (ref 31–37)
MCV RBC AUTO: 97 FL (ref 81–99)
MONOCYTES # BLD AUTO: 0.5 THOUSAND/ΜL (ref 0.17–1.22)
MONOCYTES NFR BLD AUTO: 7 % (ref 2–12)
NEUTROPHILS # BLD AUTO: 4.7 THOUSANDS/ΜL (ref 1.4–6.5)
NEUTS SEG NFR BLD AUTO: 60 % (ref 42–75)
NIDUS STONE QL: NORMAL
NITRITE UR QL STRIP: NEGATIVE
PH UR STRIP.AUTO: 6 [PH]
PHOTO: NORMAL
PLATELET # BLD AUTO: 394 THOUSANDS/UL (ref 149–390)
PMV BLD AUTO: 8.4 FL (ref 8.6–11.7)
POTASSIUM SERPL-SCNC: 3.9 MMOL/L (ref 3.5–5.5)
PROT SERPL-MCNC: 6.9 G/DL (ref 6.4–8.9)
PROT UR STRIP-MCNC: NEGATIVE MG/DL
PROTHROMBIN TIME: 12.2 SECONDS (ref 10.2–13)
RBC # BLD AUTO: 3.23 MILLION/UL (ref 4.3–5.9)
SIZE STONE: NORMAL MM
SODIUM SERPL-SCNC: 142 MMOL/L (ref 134–143)
SP GR UR STRIP.AUTO: 1.01 (ref 1–1.03)
STONE ANALYSIS-IMP: NORMAL
UROBILINOGEN UR QL STRIP.AUTO: 0.2 E.U./DL
WBC # BLD AUTO: 7.7 THOUSAND/UL (ref 4.8–10.8)
WT STONE: 96.1 MG

## 2019-10-08 PROCEDURE — 85610 PROTHROMBIN TIME: CPT | Performed by: PHYSICIAN ASSISTANT

## 2019-10-08 PROCEDURE — 96375 TX/PRO/DX INJ NEW DRUG ADDON: CPT

## 2019-10-08 PROCEDURE — 85025 COMPLETE CBC W/AUTO DIFF WBC: CPT | Performed by: PHYSICIAN ASSISTANT

## 2019-10-08 PROCEDURE — 96365 THER/PROPH/DIAG IV INF INIT: CPT

## 2019-10-08 PROCEDURE — 81003 URINALYSIS AUTO W/O SCOPE: CPT | Performed by: PHYSICIAN ASSISTANT

## 2019-10-08 PROCEDURE — 99284 EMERGENCY DEPT VISIT MOD MDM: CPT

## 2019-10-08 PROCEDURE — 80053 COMPREHEN METABOLIC PANEL: CPT | Performed by: PHYSICIAN ASSISTANT

## 2019-10-08 PROCEDURE — 36415 COLL VENOUS BLD VENIPUNCTURE: CPT | Performed by: PHYSICIAN ASSISTANT

## 2019-10-08 PROCEDURE — 85730 THROMBOPLASTIN TIME PARTIAL: CPT | Performed by: PHYSICIAN ASSISTANT

## 2019-10-08 PROCEDURE — 96361 HYDRATE IV INFUSION ADD-ON: CPT

## 2019-10-08 PROCEDURE — 93005 ELECTROCARDIOGRAM TRACING: CPT

## 2019-10-08 RX ORDER — LORAZEPAM 2 MG/ML
2 INJECTION INTRAMUSCULAR ONCE
Status: COMPLETED | OUTPATIENT
Start: 2019-10-08 | End: 2019-10-08

## 2019-10-08 RX ORDER — LEVETIRACETAM 750 MG/1
750 TABLET ORAL 2 TIMES DAILY
Qty: 60 TABLET | Refills: 0 | Status: SHIPPED | OUTPATIENT
Start: 2019-10-08 | End: 2019-10-25 | Stop reason: SDUPTHER

## 2019-10-08 RX ADMIN — LORAZEPAM 2 MG: 2 INJECTION INTRAMUSCULAR; INTRAVENOUS at 10:18

## 2019-10-08 RX ADMIN — SODIUM CHLORIDE 1000 ML: 0.9 INJECTION, SOLUTION INTRAVENOUS at 10:19

## 2019-10-08 RX ADMIN — LEVETIRACETAM 1000 MG: 100 INJECTION, SOLUTION INTRAVENOUS at 14:12

## 2019-10-08 NOTE — ED PROVIDER NOTES
History  Chief Complaint   Patient presents with    Seizure - Prior Hx Of     Patient had 3-5 minute grand mal seizure per caregiver who patient lives with  Reports that patient's medications were changed about 2 weeks ago  61-year-old male presents emergency room via EMS with caregiver for witnessed what she describes as grand mal seizure lasting about 3-5 minutes  Patient has a long history of seizure disorder however has not had a breakthrough seizure in over 10 years until the past week  Caregiver states he was recently hospitalized for urinary tract infection with kidney problems and his seizure medication was changed  He re-presented today now for the 2nd time in the past week with a breakthrough seizure  Loss of bladder did occur  Patient otherwise showing no complaints of respiratory distress nor complaints of pain chest pain caregiver denies fevers nausea vomiting or complaints of abdominal pain or urinary complaints  History provided by:  Caregiver  History limited by: BASELINE MR AND CEREBRAL PALSY   used: No    Seizure - Prior Hx Of   Seizure activity on arrival: no    Seizure type:  Grand mal (Witnessed per caregiver)  Preceding symptoms comment:  BODY TWITCHING  Initial focality:  Multifocal  Episode characteristics: abnormal movements, disorientation and incontinence    Return to baseline: yes    Severity:  Moderate  Duration:  3 minutes  Number of seizures this episode:  Second episode in 2 weeks  Progression:  Worsening  Context: cerebral palsy and change in medication    Meds prior to arrival: baseline on vimpat  History of seizures: yes        Allergies   Allergen Reactions    Erythromycin     Penicillins            Prior to Admission Medications   Prescriptions Last Dose Informant Patient Reported? Taking?    QUEtiapine (SEROquel XR) 200 mg 24 hr tablet   No Yes   Sig: TAKE 1 TABLET BY MOUTH TWICE A DAY (8AM,2PM)   QUEtiapine (SEROquel XR) 400 mg 24 hr tablet   No Yes   Sig: TAKE 1 TABLET BY MOUTH AT BEDTIME (8PM) (DX: ANTIPSYCHOTIC)   acetaminophen (TYLENOL) 325 mg tablet   No Yes   Sig: Take 2 tablets (650 mg total) by mouth every 6 (six) hours as needed for fever   benztropine (COGENTIN) 0 5 mg tablet   No Yes   Sig: TAKE 1 TABLET BY MOUTH TWICE A DAY (8AM,8PM)   bisacodyl (DULCOLAX) 10 mg suppository   No Yes   Sig: Insert 1 suppository (10 mg total) into the rectum daily as needed for constipation   calcium carbonate (OYSTER SHELL,OSCAL) 500 mg   No Yes   Sig: Take 1 tablet by mouth 3 (three) times a day   cholecalciferol (VITAMIN D3) 1,000 units tablet  Other (Specify) No Yes   Sig: Take 1 tablet (1,000 Units total) by mouth daily   Patient taking differently: Take 1,000 Units by mouth every other day    cyanocobalamin (VITAMIN B-12) 1,000 mcg tablet   No Yes   Sig: TAKE 1 TABLET BY MOUTH EVERY OTHER DAY (8 AM)   docusate sodium (COLACE) 100 mg capsule   No Yes   Sig: TAKE 1 CAPSULE BY MOUTH TWICE A DAY (8AM,8PM)   hydrOXYzine pamoate (VISTARIL) 25 mg capsule   No Yes   Sig: TAKE 2 CAPSULES (50MG) BY MOUTH 3 TIMES A DAY (8AM,2PM,8PM)   ketoconazole (NIZORAL) 2 % shampoo   No Yes   Sig: Wash hair and sideburns at least 3 times per week   lacosamide (VIMPAT) 150 mg tablet   No Yes   Sig: Take 1 tablet (150 mg total) by mouth every 12 (twelve) hours   levothyroxine 125 mcg tablet  Other (Specify) No Yes   Sig: TAKE 1 TABLET BY MOUTH DAILY (8AM)   Patient taking differently: 125 mcg daily    lisinopril (ZESTRIL) 10 mg tablet   No Yes   Sig: TAKE 1 TABLET BY MOUTH DAILY (8AM)   loratadine (ALLERGY) 10 mg tablet   No Yes   Sig: Take 1 tablet (10 mg total) by mouth daily Take 1 tablet by mouth daily PRN   metFORMIN (GLUCOPHAGE) 1000 MG tablet   No Yes   Sig: Take 1 tablet (1,000 mg total) by mouth 2 (two) times a day with meals   multivitamin (THERAGRAN) TABS   No Yes   Sig: Take 1 tablet by mouth daily   simvastatin (ZOCOR) 40 mg tablet   No Yes   Sig: Take 1 tablet (40 mg total) by mouth daily      Facility-Administered Medications: None       Past Medical History:   Diagnosis Date    ADRIANA ([facility] kidney injury) ([facility] [address] ) 9/24/2019    CP [facility]cerebral palsy) ([facility])     Disease of thyroid gland     Hypertension     Kidney failure     Osteoporosis     Psychiatric disorder     Scoliosis     Seizures ([facility])     UTI (urinary tract infection)        Past Surgical History:   Procedure Laterality Date    APPENDECTOMY         History reviewed  No pertinent family history  I have reviewed and agree with the history as documented  Social History     Tobacco Use    Smoking status: Never Smoker    Smokeless tobacco: Never Used   Substance Use Topics    Alcohol use: Never     Frequency: Never    Drug use: No        Review of Systems   Constitutional: Negative for chills, diaphoresis, fatigue and fever  HENT: Negative for congestion, ear pain, rhinorrhea, sneezing and sore throat  Respiratory: Negative for cough, shortness of breath, wheezing and stridor  Cardiovascular: Negative for chest pain, palpitations and leg swelling  Gastrointestinal: Negative for abdominal pain, constipation, diarrhea, nausea and vomiting  Genitourinary: Negative for difficulty urinating, dysuria, frequency, hematuria and urgency  Musculoskeletal: Negative for gait problem, myalgias and neck pain  Skin: Negative for rash  Neurological: Positive for seizures  Negative for dizziness, syncope, weakness, light-headedness and headaches  All other systems reviewed and are negative  Physical Exam  Physical Exam   Constitutional: He appears well-developed and well-nourished  Baseline mental retardation, cerebral palsy  Alert and oriented to himself not to time or place  HENT:   Head: Normocephalic and atraumatic  No tongue bites were noted   Eyes: Pupils are equal, round, and reactive to light  Conjunctivae and EOM are normal    Neck: Normal range of motion  Neck supple   No tracheal deviation present  Cardiovascular: Normal rate, regular rhythm, normal heart sounds and intact distal pulses  Pulmonary/Chest: Effort normal and breath sounds normal  No respiratory distress  He has no wheezes  Abdominal: Soft  Bowel sounds are normal  He exhibits no distension  There is no tenderness  Genitourinary:   Genitourinary Comments: Caretaker states patient urinated himself following the seizure but she changes close and at this time is dry  Musculoskeletal: Normal range of motion  He exhibits no edema or tenderness  Neurological: He is alert  No cranial nerve deficit or sensory deficit  He exhibits normal muscle tone  Skin: Skin is warm and dry  No rash noted  No erythema  Nursing note and vitals reviewed        Vital Signs  ED Triage Vitals [10/08/19 0958]   Temperature Pulse Respirations Blood Pressure SpO2   (!) 97 2 °F (36 2 °C) 103 18 161/78 95 %      Temp Source Heart Rate Source Patient Position - Orthostatic VS BP Location FiO2 (%)   Temporal Monitor Lying Left arm --      Pain Score       No Pain           Vitals:    10/08/19 0958 10/08/19 1145 10/08/19 1400   BP: 161/78 151/100 (!) 164/102   Pulse: 103 95 90   Patient Position - Orthostatic VS: Lying Lying Lying         Visual Acuity      ED Medications  Medications   LORazepam (ATIVAN) 2 mg/mL injection 2 mg (2 mg Intravenous Given 10/8/19 1018)   sodium chloride 0 9 % bolus 1,000 mL (0 mL Intravenous Stopped 10/8/19 1119)   levETIRAcetam (KEPPRA) 1,000 mg in sodium chloride 0 9 % 100 mL IVPB (0 mg Intravenous Stopped 10/8/19 1437)       Diagnostic Studies  Results Reviewed     Procedure Component Value Units Date/Time    UA w Reflex to Microscopic w Reflex to Culture [[id number]]  (Normal) Collected:  10/08/19 1129    Lab Status:  Final result Specimen:  Urine, Other Updated:  10/08/19 1149     Color, UA Yellow     Clarity, UA Clear     Specific Gravity, UA 1 015     pH, UA 6 0     Leukocytes, UA Negative     Nitrite, UA Negative     Protein, UA Negative mg/dl      Glucose, UA Negative mg/dl      Ketones, UA Negative mg/dl      Urobilinogen, UA 0 2 E U /dl      Bilirubin, UA Negative     Blood, UA Negative    Comprehensive metabolic panel [[id number]]  (Abnormal) Collected:  10/08/19 1018    Lab Status:  Final result Specimen:  Blood from Arm, Right Updated:  10/08/19 1042     Sodium 142 mmol/L      Potassium 3 9 mmol/L      Chloride 106 mmol/L      CO2 29 mmol/L      ANION GAP 7 mmol/L      BUN 26 mg/dL      Creatinine 0 80 mg/dL      Glucose 140 mg/dL      Calcium 9 4 mg/dL      AST 20 U/L      ALT 44 U/L      Alkaline Phosphatase 85 U/L      Total Protein 6 9 g/dL      Albumin 3 6 g/dL      Total Bilirubin 0 20 mg/dL      eGFR 103 ml/min/1 73sq m     Narrative:       [facility] guidelines for Chronic Kidney Disease (CKD):     Stage 1 with normal or high GFR (GFR > 90 mL/min/1 73 square meters)    Stage 2 Mild CKD (GFR = 60-89 mL/min/1 73 square meters)    Stage 3A Moderate CKD (GFR = 45-59 mL/min/1 73 square meters)    Stage 3B Moderate CKD (GFR = 30-44 mL/min/1 73 square meters)    Stage 4 Severe CKD (GFR = 15-29 mL/min/1 73 square meters)    Stage 5 End Stage CKD (GFR <15 mL/min/1 73 square meters)  Note: GFR calculation is accurate only with a steady state creatinine    APTT [[id number]]  (Normal) Collected:  10/08/19 1018    Lab Status:  Final result Specimen:  Blood from Arm, Right Updated:  10/08/19 1037     PTT 23 seconds     Protime-INR [[id number]]  (Normal) Collected:  10/08/19 1018    Lab Status:  Final result Specimen:  Blood from Arm, Right Updated:  10/08/19 1037     Protime 12 2 seconds      INR 1 05    CBC and differential [[id number]]  (Abnormal) Collected:  10/08/19 1018    Lab Status:  Final result Specimen:  Blood from Arm, Right Updated:  10/08/19 1037     WBC 7 70 Thousand/uL      RBC 3 23 Million/uL      Hemoglobin 10 5 g/dL      Hematocrit 31 3 %      MCV 97 fL      MCH 32 5 pg MCHC 33 5 g/dL      RDW 14 1 %      MPV 8 4 fL      Platelets 119 Thousands/uL      Neutrophils Relative 60 %      Lymphocytes Relative 29 %      Monocytes Relative 7 %      Eosinophils Relative 2 %      Basophils Relative 2 %      Neutrophils Absolute 4 70 Thousands/µL      Lymphocytes Absolute 2 20 Thousands/µL      Monocytes Absolute 0 50 Thousand/µL      Eosinophils Absolute 0 20 Thousand/µL      Basophils Absolute 0 10 Thousands/µL                  No orders to display              Procedures  ECG 12 Lead Documentation Only  Date/Time: 10/8/2019 12:23 PM  Performed by: Rayne Ang PA-C  Authorized by: Rayne Ang PA-C     Indications / Diagnosis:  98  ECG reviewed by me, the ED Provider: yes (& Dr Madeline Grimaldo)    Patient location:  ED  Previous ECG:     Previous ECG:  Compared to current    Comparison ECG info:  10/4/19    Similarity:  No change  Interpretation:     Interpretation: abnormal    Rate:     ECG rate:  98    ECG rate assessment: tachycardic    Rhythm:     Rhythm: sinus tachycardia    QRS:     QRS axis:  Normal  Conduction:     Conduction: abnormal      Abnormal conduction: complete RBBB    ST segments:     ST segments:  Non-specific  T waves:     T waves: non-specific             ED Course  ED Course as of Oct 08 2136   Tue Oct 08, 2019   1231 Order placed for pacs to d/w neurologist       1314 Return call from neurologist on-call through pacs,  awaiting discussion with epilepsy specialist to determine course of treatment  1336 Return call from neurology, Dr Zabala January, recommendation of iv kepra 1g load, and 750mg q12  If he develops a breakthrough event at home 1500mg q12        1344 Counseled with patient's caregiver who would like to stay within the [address] [facility]  Advised patient follow up with Neurology ASAP and per discussion with Dr Vj Harris an appointment with an epilepsy specialist would be facilitated        1408 Return call to pacs to facilitate an outpatient appointment with an epilepsy specialist                                   MDM  Number of Diagnoses or Management Options  Breakthrough [facility] established and worsening  Essential hypertension:   Seizure disorder ([facility] ): established and worsening     Amount and/or Complexity of Data Reviewed  Clinical lab tests: ordered and reviewed  Discuss the patient with other providers: yes (Neurologist, Dr Leisa Jones)    Risk of Complications, Morbidity, and/or Mortality  Presenting problems: moderate  Diagnostic procedures: moderate  Management options: moderate    Patient Progress  Patient progress: stable      Disposition  Final diagnoses:   Seizure disorder ([facility] )   Breakthrough seizure ([facility] )   Essential hypertension     Time reflects when diagnosis was documented in both MDM as applicable and the Disposition within this note     Time User Action Codes Description Comment    10/8/2019  2:07 PM Torri Alba Add [G40 909] Seizure disorder ([facility] )     10/8/2019  2:07 PM David TUCKER Add [G40 919] Breakthrough seizure ([facility] )     10/8/2019  2:07 PM Torri Alba Add [I10] Essential hypertension       ED Disposition     ED Disposition Condition Date/Time Comment    Discharge Stable Tue Oct 8, 2019 12:19 PM Ze Oconnell discharge to home/self care              Follow-up Information     Follow up With Specialties Details Why Contact Info Additional Jules Angulo Neurology [facility] Neu[facility] Schedule an appointment as soon as possible for a visit   Chinedu Tucker [facility] [employer] Neurology [facility], [address] [place] [address]          Discharge Medication List as of 10/8/2019  2:13 PM      START taking these medications    Details   levETIRAcetam (KEPPRA) 750 mg tablet Take 1 tablet (750 mg total) by mouth 2 (two) times a day, Starting Tue 10/8/2019, Until Thu 11/7/2019, Print         CONTINUE these medications which have NOT CHANGED    Details   acetaminophen (TYLENOL) 325 mg tablet Take 2 tablets (650 mg total) by mouth every 6 (six) hours as needed for fever, Starting Mon 9/30/2019, No Print      benztropine (COGENTIN) 0 5 mg tablet TAKE 1 TABLET BY MOUTH TWICE A DAY (8AM,8PM), Normal      bisacodyl (DULCOLAX) 10 mg suppository Insert 1 suppository (10 mg total) into the rectum daily as needed for constipation, Starting Mon 9/30/2019, No Print      calcium carbonate (OYSTER SHELL,OSCAL) 500 mg Take 1 tablet by mouth 3 (three) times a day, Starting Fri 6/7/2019, Normal      cholecalciferol (VITAMIN D3) 1,000 units tablet Take 1 tablet (1,000 Units total) by mouth daily, Starting Fri 6/7/2019, Normal      cyanocobalamin (VITAMIN B-12) 1,000 mcg tablet TAKE 1 TABLET BY MOUTH EVERY OTHER DAY (8 AM), Normal      docusate sodium (COLACE) 100 mg capsule TAKE 1 CAPSULE BY MOUTH TWICE A DAY (8AM,8PM), Normal      hydrOXYzine pamoate (VISTARIL) 25 mg capsule TAKE 2 CAPSULES (50MG) BY MOUTH 3 TIMES A DAY (8AM,2PM,8PM), Normal      ketoconazole (NIZORAL) 2 % shampoo Wash hair and sideburns at least 3 times per week, Normal      lacosamide (VIMPAT) 150 mg tablet Take 1 tablet (150 mg total) by mouth every 12 (twelve) hours, Starting Mon 9/30/2019, Until Wed 10/30/2019, Phone In      levothyroxine 125 mcg tablet TAKE 1 TABLET BY MOUTH DAILY (8AM), Normal      lisinopril (ZESTRIL) 10 mg tablet TAKE 1 TABLET BY MOUTH DAILY (8AM), Normal      loratadine (ALLERGY) 10 mg tablet Take 1 tablet (10 mg total) by mouth daily Take 1 tablet by mouth daily PRN, Starting Fri 6/7/2019, Normal      metFORMIN (GLUCOPHAGE) 1000 MG tablet Take 1 tablet (1,000 mg total) by mouth 2 (two) times a day with meals, Starting Fri 6/7/2019, Normal      multivitamin (THERAGRAN) TABS Take 1 tablet by mouth daily, Starting Fri 6/7/2019, Normal      !! QUEtiapine (SEROquel XR) 200 mg 24 hr tablet TAKE 1 TABLET BY MOUTH TWICE A DAY (8AM,2PM), Normal      !!  QUEtiapine (SEROquel XR) 400 mg 24 hr tablet TAKE 1 TABLET BY MOUTH AT BEDTIME (8PM) (DX: ANTIPSYCHOTIC), Normal      simvastatin (ZOCOR) 40 mg tablet Take 1 tablet (40 mg total) by mouth daily, Starting Wed 5/1/2019, Normal       !! - Potential duplicate medications found  Please discuss with provider  No discharge procedures on file      ED Provider  Electronically Signed by           Elda De PA-C  10/08/19 3887

## 2019-10-08 NOTE — TELEPHONE ENCOUNTER
Caregiver Yanna Cee called because she is concerned about his medications  He just had a seizure and ambulance is currently there to take him to the hospital   Caretaker states she is going to take him to [facility]  Patient has upcoming appointment with Dr Gabriel Alexander  Advised caretaker to follow up with ED physicians  Dr Gabriel Alexander, just JUAREZ patient scheduled to see you 10/30/2019

## 2019-10-08 NOTE — DISCHARGE INSTRUCTIONS
BEGIN TAKING KEPPRA 750 MG TWICE DAILY WITH BEGINNING THIS EVENING  IF YOU DEVELOP ANOTHER BREAKTHROUGH SEIZURE YOU MAY DOUBLE THE DOSE AND TAKE 1500 MG TWICE DAILY AND FOLLOW UP WITH NEUROLOGY ASAP

## 2019-10-09 ENCOUNTER — PATIENT OUTREACH (OUTPATIENT)
Dept: CASE MANAGEMENT | Facility: OTHER | Age: 54
End: 2019-10-09

## 2019-10-09 DIAGNOSIS — Z78.9 NEED FOR FOLLOW-UP BY SOCIAL WORKER: Primary | ICD-10-CM

## 2019-10-09 NOTE — PROGRESS NOTES
Attempted to contact PCP  on record 5, Jasmin Hutchins   Left message to return call with this CM's contact information

## 2019-10-09 NOTE — TELEPHONE ENCOUNTER
Please see the neurology clinical note, patient is on Vimpat for seizures, his Depakote was discontinued due to worsening thrombocytopenia, if it is for mood stabilization, they should contact the psychiatrist to see if there is any alternative medication they may want the patient to be on    Thank you

## 2019-10-09 NOTE — PROGRESS NOTES
Call returned from Lauren/ Dr Carrillo Wells office at [facility]   This CM explained medication and behavior issues to Chinedu Anders, she will give information to Dr Ivis Jay and return call

## 2019-10-09 NOTE — PROGRESS NOTES
Received call from Rosamaria Metz, nurse at [facility]  Patient has appointment scheduled for end of next week  Rosamaria Metz stated that Michelle Willamss, patient's caretaker called office requesting Ativan for patient, which PCP has not done

## 2019-10-09 NOTE — PROGRESS NOTES
Reached out to patient's caretaker, Nancy Boyd for follow up after patient was seen in ED in the last week for breakthrough seizures  Dov Bryant states she has been patient's caregiver for over 10 years and has never observed him having these violent episodes  She had to cancel his Home health assessment visit due to his violent outbursts which include physical and verbal aggression; hitting, yelling, cursing  He is unable to travel in the car for doctor appointment because of this violent behavior  Nancy Boyd stated that since his 9/24 hospitalization, patient has become violent in the home  During this admission, patient's anti-siezure medications had been changed  Since that discharge patient was seen in ED 2 times for breakthrough grand mal seizures within this week  Patient's activity level has decreased  Patient would play ball outside and engage in physical activities  Since his discharge, Dov Bryant states that patient "does nothing all day"  She is extremely concerned because she is exhausted and may have to give up caring for him after 10 years  Patient has a sister who takes patient for 1 weekend a month  Patient has been too violent to get into car to go visit  Sister is not actively involved in patient's care deferring all issues for Keely to take care of  Will research case and Explained to Dov Bryant that CM will research and reach out to doctors to attempt to organize his medications  This CM contact infor mailed to patient and Dov Bryant at address on record

## 2019-10-09 NOTE — PROGRESS NOTES
Reached out clinic where patient receives psychiatric care and medication prescriptions  Left message to return call with this CM's contact information

## 2019-10-09 NOTE — PROGRESS NOTES
Received call from Neurologist  Doctor prescribed medications for seizures and recommended patient see psychiatrist for mood stabilizing medication  ,

## 2019-10-09 NOTE — PROGRESS NOTES
Faxed to Dr Tatiana Salazar office for their review:  9/24 hospital admissio  10/4 ED visit10/8 ED visit

## 2019-10-09 NOTE — PROGRESS NOTES
OPCM SW received referral from [facility]  Patient's had in increase in anxiety/ behavioral health symptoms  Provided RN CM with the information to [facility]MON[facility] Araiza) [phone]  Ext  [phone]  Also, if patient is combative, and a danger can make a referral to [employer] adult protective services  No other needs at this time

## 2019-10-09 NOTE — TELEPHONE ENCOUNTER
Received call from Kristal Hess the outpatient care manager with [facility], reports that this patient was on a mood stabilizer    depakote 1000 mg in am and 1500 mg in evening for 10 years and when in hospital this was discontinued  Reports since that time he has had very violent behavior, where they can not even get him in the car to go to the doctor  States they want to see if they can get him stabilized  He has also had breakthrough seizures     [phone]

## 2019-10-09 NOTE — TELEPHONE ENCOUNTER
Franklin Fernando made aware of below, she states patient is scheduled to see psychiatrist on Monday  Patient will follow up with Dr Shannon Perea on 10/30  Franklin Fernando states she will notify the family

## 2019-10-10 LAB
ATRIAL RATE: 98 BPM
BACTERIA BLD CULT: NORMAL
BACTERIA BLD CULT: NORMAL
P AXIS: 52 DEGREES
PR INTERVAL: 166 MS
QRS AXIS: 36 DEGREES
QRSD INTERVAL: 146 MS
QT INTERVAL: 424 MS
QTC INTERVAL: 541 MS
T WAVE AXIS: 44 DEGREES
VENTRICULAR RATE: 98 BPM

## 2019-10-10 PROCEDURE — 93010 ELECTROCARDIOGRAM REPORT: CPT | Performed by: INTERNAL MEDICINE

## 2019-10-11 DIAGNOSIS — G23.2 PARKINSON'S PLUS SYNDROME (HCC): ICD-10-CM

## 2019-10-14 ENCOUNTER — TRANSITIONAL CARE MANAGEMENT (OUTPATIENT)
Dept: FAMILY MEDICINE CLINIC | Facility: HOME HEALTHCARE | Age: 54
End: 2019-10-14

## 2019-10-15 RX ORDER — DIVALPROEX SODIUM 500 MG
TABLET, EXTENDED RELEASE 24 HR ORAL
Qty: 140 TABLET | Refills: 10 | Status: SHIPPED | OUTPATIENT
Start: 2019-10-15 | End: 2019-11-19 | Stop reason: ALTCHOICE

## 2019-10-15 RX ORDER — BENZTROPINE MESYLATE 0.5 MG/1
TABLET ORAL
Qty: 56 TABLET | Refills: 10 | Status: SHIPPED | OUTPATIENT
Start: 2019-10-15 | End: 2020-08-12

## 2019-10-22 ENCOUNTER — TELEPHONE (OUTPATIENT)
Dept: NEUROLOGY | Facility: CLINIC | Age: 54
End: 2019-10-22

## 2019-10-22 NOTE — TELEPHONE ENCOUNTER
----- Message from Rhoda Frausto MD sent at 10/10/2019  4:18 PM EDT -----  Regarding: RE: Epilepsy Follow up  He can be added in an 8 am slot to my schedule if needed  ----- Message -----  From: Carmen Greene RN  Sent: 10/10/2019   2:28 PM EDT  To: Shahzad Milligan MD, Robyn Rios MD, #  Subject: RE: Epilepsy Follow up                           Dr Bonnielee Hashimoto will be in [place] on the 24, but there is no 8am appt available due to the surgery conference and it is not a studies week  That day is booked at this point, unless there is a cancellation  The patient is currently scheduled to see Dr Geni Alberto as a hospital followup  For an 8am add on during a studies week, the patient would need to be seen in [facility],  or [facility]  Should patient keep appt with Dr Geni Alberto for now or recommend they travel down to the [facility]?    ----- Message -----  From: Shahzad Milligan MD  Sent: 10/10/2019   9:37 AM EDT  To: Carmen Greene RN, Robyn Rios MD, #  Subject: Epilepsy Follow up                               [facility] and [address] [place] [address],    This is a challenging case    Dwain Sutherland is a patient with baseline MRCP and epilepsy  He was seen in the hospital in the recent past for infection and found to have ADRIANA and thrombocytopenia so he was taken off of Depakote and Zonisimide and placed on Vimpat    He now has had 2 breakthrough seizures and was in the ER  I reviewed the case with Sheng Late (although he has no outpatient availability in the next week or two he says)  For now, he was back to baseline so we left the Vimpat and added Keppra just to get broader coverage but we were hoping that he could take one of the 8am slots in the next few weeks so that we can get him in    He bounces between being seen at [place] and [address] [place] [address]  He does have an appiontment on the books with Dr Thalia Mejia from [facility] but does NOT want to follow with [facility] and wants to work with us  Please will you let me know    Thanks a lot!!

## 2019-10-22 NOTE — TELEPHONE ENCOUNTER
Called and Left a message on pt's answering machine for a call back        Looking to see if patient is agreeable to coming to [facility] and then can scheduled with Dr Danilo Bowman or Ramandeep Rivas in an 8am slot during studies week

## 2019-10-24 ENCOUNTER — TELEPHONE (OUTPATIENT)
Dept: NEUROLOGY | Facility: CLINIC | Age: 54
End: 2019-10-24

## 2019-10-25 DIAGNOSIS — G40.909 SEIZURE DISORDER (HCC): ICD-10-CM

## 2019-10-25 DIAGNOSIS — G40.309 GENERALIZED CONVULSIVE EPILEPSY (HCC): ICD-10-CM

## 2019-10-25 RX ORDER — LEVETIRACETAM 750 MG/1
750 TABLET ORAL 2 TIMES DAILY
Qty: 60 TABLET | Refills: 0 | Status: SHIPPED | OUTPATIENT
Start: 2019-10-25 | End: 2019-11-20 | Stop reason: SDUPTHER

## 2019-10-25 RX ORDER — LACOSAMIDE 150 MG/1
150 TABLET ORAL EVERY 12 HOURS SCHEDULED
Qty: 60 TABLET | Refills: 0 | OUTPATIENT
Start: 2019-10-25 | End: 2019-10-25 | Stop reason: SDUPTHER

## 2019-10-25 RX ORDER — LACOSAMIDE 150 MG/1
150 TABLET ORAL EVERY 12 HOURS SCHEDULED
Qty: 60 TABLET | Refills: 0 | Status: SHIPPED | OUTPATIENT
Start: 2019-10-25 | End: 2019-11-20 | Stop reason: SDUPTHER

## 2019-10-25 RX ORDER — LEVETIRACETAM 750 MG/1
750 TABLET ORAL 2 TIMES DAILY
Qty: 60 TABLET | Refills: 0 | Status: SHIPPED | OUTPATIENT
Start: 2019-10-25 | End: 2019-10-25 | Stop reason: SDUPTHER

## 2019-10-25 NOTE — TELEPHONE ENCOUNTER
Patient is a HFU, scheduled to see you 11/6  Patient's caregiver Ray Cordon) is running out of medication and asking that meds be filled until upcoming appointment

## 2019-10-25 NOTE — TELEPHONE ENCOUNTER
Signed orders  One was set to print and the other was phone-in  However, another encounter says patient uses [place]  Do I need to send it electronically to [facility]?

## 2019-10-25 NOTE — TELEPHONE ENCOUNTER
Patient is scheduled with you on 11/6 @ 12pm  Patient unable to do 8am due to driving so far away  Spoke with patients caregiver Haresh Lyla, she states patient is running out of Vimpat 150 MG 1 tablet twice daily and Keppra 750 MG 1 tablet by mouth twice daily, can you please refill these until patient is seen in our office  Patient is currently no longer on Depakote  Patient is using [facility] pharmacy  Refills order, please order if okay

## 2019-10-25 NOTE — TELEPHONE ENCOUNTER
Yes, per 10/22 encounter patient uses [facility] pharmacy  Please review and sign, thanks!

## 2019-10-30 ENCOUNTER — PATIENT OUTREACH (OUTPATIENT)
Dept: CASE MANAGEMENT | Facility: OTHER | Age: 54
End: 2019-10-30

## 2019-10-30 NOTE — PROGRESS NOTES
Reached out to patient's Alee Zamora  She reports that patient is "doing much better"  He is no longer violent, has not had any evidence of breakthrough seizures     Patient was seen by neurologist and was proscribed Vimpat 150 mg 1 tab BID and Keppra 750 mg  1 tab BID  Caregiver states that his "behavior has become much more manageable", no untoward changes in mental status  He will be traveling to spend weekend with his sister who defers medical decisions and medical management to his caregiver  Patient is DM2, non insulin dependant  She does not monitor his blood sugars daily at home but he has fingerstick blood glocose done in doctor's office averaging between 108-120  Martir Gonzalez Educated caregiver on signs and symptoms of hyperglycemia that should be reported to doctor immediately;  Weakness, hunger, headache, shakiness sweating and to consume several pieces of candy or 1/2 cup OJ , she verbalized understanding by partial teachback  Will review with caregiver next follow up call  Caregiver denies any issues or concerns with medications such as side effects or inability to obtain medications  Educated/ reviewed observing for adequate urine output, back pain, fever, decreased amount or dark urine, confusion  Caregiver had no further questions or concerns at this time

## 2019-10-31 ENCOUNTER — PATIENT OUTREACH (OUTPATIENT)
Dept: CASE MANAGEMENT | Facility: OTHER | Age: 54
End: 2019-10-31

## 2019-10-31 ENCOUNTER — TELEPHONE (OUTPATIENT)
Dept: NEUROLOGY | Facility: CLINIC | Age: 54
End: 2019-10-31

## 2019-10-31 NOTE — PROGRESS NOTES
Letter sent to Isreal Pearson with educational /instructional materials in event patient has hypo or hyper lgycemic emergency, symptoms to report to doctor immediately following his UTI and renal failure  Reviewed information with her yesterday and there was a notable knowledge deficit when discussing the many co-morbidities of the patient

## 2019-10-31 NOTE — TELEPHONE ENCOUNTER
I have not seen the patient  They should contact the PCP regarding referral  Can also discuss at there next visit

## 2019-10-31 NOTE — PROGRESS NOTES
Contacted [facility], spoke with triage nurse  Patient has appointment with Dr Bety Traore on 11/6  This will be his first scheduled face-to-face appointment with doctor since his [employer] discharge on 9/30  This CM reported to nurse pertinent patient history and requested that Home health with skilled nursing, physical and Occupational Therapy assess patient for service needs  Patient was scheduled to begin home health services at discharge with [facility] home care, bur caregiver cancelled them stating that "patient's behavior is unstable and he becomes violent, due to meds being changed in hospital "       After speaking with caregiver, Felix Manning yesterday,  this CM was concerned that she had no knowledge of what do do in an emergency event regarding hypoglycemia or importance of monitoring urine output considering patient has DM and renal failure aside from seizure disorder, Cerebral palsy with cognitive impairment and mobility dysfunction  Patient's sister is not very involved with medical management deferring most decisions to caretaker, who has no medical background      Triage nurse stated that she will notify Dr Bety Traore pro

## 2019-10-31 NOTE — PROGRESS NOTES
Attempted to contact [facility] to request patient begin Home care ordered on hospital discharge, but cancelled by caretaker  Spoke with Kaycee Bergman who placed me on hold for 11 minutes, then I was disconnected

## 2019-10-31 NOTE — TELEPHONE ENCOUNTER
Hamida Sloan from Care Coordination requesting to have a referral for Home Health to be re-ordered now that patient is more stable with mental status  She is hoping to have patient re-assessed for PT, skilled nursing and possibly OT  Patient no longer violent and no evidence of epileptic breakthrough  Patient only has caregiver at this time  She is currently providing 24/7 care  She does not have much knowledge of his condition  She is not a skilled caregiver  Please advise if you are agreeable to this and provide a referral if agreeable  You are scheduled to see him on 11/6/19  Callback for Mis Crockett [phone] okay to leave detailed message

## 2019-11-04 ENCOUNTER — TELEPHONE (OUTPATIENT)
Dept: NEUROLOGY | Facility: CLINIC | Age: 54
End: 2019-11-04

## 2019-11-06 DIAGNOSIS — E08.00 DIABETES MELLITUS DUE TO UNDERLYING CONDITION WITH HYPEROSMOLARITY WITHOUT COMA, WITHOUT LONG-TERM CURRENT USE OF INSULIN (HCC): ICD-10-CM

## 2019-11-06 DIAGNOSIS — J30.9 ALLERGIC RHINITIS, UNSPECIFIED SEASONALITY, UNSPECIFIED TRIGGER: ICD-10-CM

## 2019-11-06 DIAGNOSIS — I10 HYPERTENSION, UNSPECIFIED TYPE: ICD-10-CM

## 2019-11-06 DIAGNOSIS — K59.00 CONSTIPATION, UNSPECIFIED CONSTIPATION TYPE: ICD-10-CM

## 2019-11-06 DIAGNOSIS — G62.9 NEUROPATHY: ICD-10-CM

## 2019-11-06 DIAGNOSIS — E78.5 HYPERLIPIDEMIA, UNSPECIFIED HYPERLIPIDEMIA TYPE: ICD-10-CM

## 2019-11-06 DIAGNOSIS — E53.8 B12 DEFICIENCY: ICD-10-CM

## 2019-11-06 DIAGNOSIS — R56.9 SEIZURE (HCC): ICD-10-CM

## 2019-11-06 RX ORDER — DOCUSATE SODIUM 100 MG/1
100 CAPSULE, LIQUID FILLED ORAL 2 TIMES DAILY
Qty: 56 CAPSULE | Refills: 4 | Status: SHIPPED | OUTPATIENT
Start: 2019-11-06 | End: 2019-11-19 | Stop reason: SDUPTHER

## 2019-11-06 RX ORDER — DOCUSATE SODIUM 100 MG/1
CAPSULE, LIQUID FILLED ORAL
Qty: 56 CAPSULE | Refills: 10 | OUTPATIENT
Start: 2019-11-06

## 2019-11-08 RX ORDER — LORATADINE 10 MG/1
TABLET ORAL
Qty: 28 TABLET | Refills: 10 | Status: SHIPPED | OUTPATIENT
Start: 2019-11-08 | End: 2019-11-19

## 2019-11-08 RX ORDER — DIPHENOXYLATE HYDROCHLORIDE AND ATROPINE SULFATE 2.5; .025 MG/1; MG/1
TABLET ORAL
Qty: 28 TABLET | Refills: 10 | Status: SHIPPED | OUTPATIENT
Start: 2019-11-08 | End: 2019-11-19

## 2019-11-08 RX ORDER — CALCIUM CARBONATE 500(1250)
TABLET ORAL
Qty: 84 EACH | Refills: 10 | Status: SHIPPED | OUTPATIENT
Start: 2019-11-08 | End: 2020-12-05

## 2019-11-08 RX ORDER — LANOLIN ALCOHOL/MO/W.PET/CERES
CREAM (GRAM) TOPICAL
Qty: 14 TABLET | Refills: 10 | Status: SHIPPED | OUTPATIENT
Start: 2019-11-08 | End: 2019-11-19 | Stop reason: SDUPTHER

## 2019-11-15 ENCOUNTER — PATIENT OUTREACH (OUTPATIENT)
Dept: CASE MANAGEMENT | Facility: OTHER | Age: 54
End: 2019-11-15

## 2019-11-18 ENCOUNTER — PATIENT OUTREACH (OUTPATIENT)
Dept: CASE MANAGEMENT | Facility: OTHER | Age: 54
End: 2019-11-18

## 2019-11-18 NOTE — PROGRESS NOTES
Spoke with Franc Camacho,  at [employer]  Patient's caretaker called to make appointment several minutes after speaking with this CM  Patient has not been seen by PCP for several months, all scheduled appointments were cancelled by caretaker  PCP staff stated that patient " is always so sweet and kinf when he has appointment  Difficult to imagine him with violent outburst "

## 2019-11-18 NOTE — PROGRESS NOTES
Contacted patient's caretaker for BPCI follow up  She immediately stated "why are you calling me again?" and was irate  This CM  Began follow up questions regarding medications and doctor visits  Shonda Levy was unable to give me dates of any doctor visits since patient's discharge stating that he "continues to have violent outbursts" During our last follow up conversation she said he had multiple visits scheduled and that he has had no further violent outbursts  She was unable to give me any doctor's names that patient had seen   She was very angry with this CM and did not want to speak any longer      This CM contacted several doctors in area and located  [facility] who has seen the patient in the past

## 2019-11-19 ENCOUNTER — OFFICE VISIT (OUTPATIENT)
Dept: FAMILY MEDICINE CLINIC | Facility: HOME HEALTHCARE | Age: 54
End: 2019-11-19
Payer: MEDICARE

## 2019-11-19 VITALS
WEIGHT: 168 LBS | HEIGHT: 70 IN | TEMPERATURE: 98.1 F | SYSTOLIC BLOOD PRESSURE: 141 MMHG | RESPIRATION RATE: 18 BRPM | HEART RATE: 94 BPM | DIASTOLIC BLOOD PRESSURE: 90 MMHG | BODY MASS INDEX: 24.05 KG/M2 | OXYGEN SATURATION: 97 %

## 2019-11-19 DIAGNOSIS — L21.9 SEBORRHEIC DERMATITIS OF SCALP: ICD-10-CM

## 2019-11-19 DIAGNOSIS — R56.9 SEIZURE (HCC): ICD-10-CM

## 2019-11-19 DIAGNOSIS — E55.9 VITAMIN D DEFICIENCY: ICD-10-CM

## 2019-11-19 DIAGNOSIS — E53.8 B12 DEFICIENCY: ICD-10-CM

## 2019-11-19 DIAGNOSIS — E03.8 HYPOTHYROIDISM DUE TO HASHIMOTO'S THYROIDITIS: ICD-10-CM

## 2019-11-19 DIAGNOSIS — Z23 NEED FOR INFLUENZA VACCINATION: Primary | ICD-10-CM

## 2019-11-19 DIAGNOSIS — E06.3 HYPOTHYROIDISM DUE TO HASHIMOTO'S THYROIDITIS: ICD-10-CM

## 2019-11-19 DIAGNOSIS — E78.5 HYPERLIPIDEMIA, UNSPECIFIED HYPERLIPIDEMIA TYPE: ICD-10-CM

## 2019-11-19 DIAGNOSIS — E08.00 DIABETES MELLITUS DUE TO UNDERLYING CONDITION WITH HYPEROSMOLARITY WITHOUT COMA, WITHOUT LONG-TERM CURRENT USE OF INSULIN (HCC): ICD-10-CM

## 2019-11-19 DIAGNOSIS — N39.0 SEPSIS DUE TO URINARY TRACT INFECTION (HCC): Chronic | ICD-10-CM

## 2019-11-19 DIAGNOSIS — I10 HYPERTENSION, UNSPECIFIED TYPE: ICD-10-CM

## 2019-11-19 DIAGNOSIS — J30.9 ALLERGIC RHINITIS, UNSPECIFIED SEASONALITY, UNSPECIFIED TRIGGER: ICD-10-CM

## 2019-11-19 DIAGNOSIS — A41.9 SEPSIS DUE TO URINARY TRACT INFECTION (HCC): Chronic | ICD-10-CM

## 2019-11-19 DIAGNOSIS — K59.00 CONSTIPATION, UNSPECIFIED CONSTIPATION TYPE: ICD-10-CM

## 2019-11-19 DIAGNOSIS — K59.09 OTHER CONSTIPATION: Chronic | ICD-10-CM

## 2019-11-19 DIAGNOSIS — Z76.89 ENCOUNTER FOR SUPPORT AND COORDINATION OF TRANSITION OF CARE: ICD-10-CM

## 2019-11-19 DIAGNOSIS — G40.909 SEIZURE DISORDER (HCC): ICD-10-CM

## 2019-11-19 DIAGNOSIS — G62.9 NEUROPATHY: ICD-10-CM

## 2019-11-19 PROCEDURE — 99213 OFFICE O/P EST LOW 20 MIN: CPT | Performed by: FAMILY MEDICINE

## 2019-11-19 PROCEDURE — G0008 ADMIN INFLUENZA VIRUS VAC: HCPCS | Performed by: FAMILY MEDICINE

## 2019-11-19 PROCEDURE — 90686 IIV4 VACC NO PRSV 0.5 ML IM: CPT

## 2019-11-19 RX ORDER — LORATADINE 10 MG/1
10 TABLET ORAL DAILY
Qty: 90 TABLET | Refills: 0 | Status: SHIPPED | OUTPATIENT
Start: 2019-11-19 | End: 2019-11-20 | Stop reason: SDUPTHER

## 2019-11-19 RX ORDER — SIMVASTATIN 40 MG
40 TABLET ORAL DAILY
Qty: 28 TABLET | Refills: 5 | Status: SHIPPED | OUTPATIENT
Start: 2019-11-19 | End: 2020-03-24 | Stop reason: SDUPTHER

## 2019-11-19 RX ORDER — CALCIUM CARBONATE 500(1250)
1 TABLET ORAL 3 TIMES DAILY
Qty: 270 TABLET | Refills: 0 | Status: SHIPPED | OUTPATIENT
Start: 2019-11-19 | End: 2019-12-18

## 2019-11-19 RX ORDER — LEVOTHYROXINE SODIUM 0.12 MG/1
125 TABLET ORAL DAILY
Qty: 12 TABLET | Refills: 10 | Status: SHIPPED | OUTPATIENT
Start: 2019-11-19 | End: 2020-12-05

## 2019-11-19 RX ORDER — KETOCONAZOLE 20 MG/ML
SHAMPOO TOPICAL
Qty: 120 ML | Refills: 3 | Status: SHIPPED | OUTPATIENT
Start: 2019-11-19 | End: 2021-09-15 | Stop reason: HOSPADM

## 2019-11-19 RX ORDER — LANOLIN ALCOHOL/MO/W.PET/CERES
CREAM (GRAM) TOPICAL
Qty: 14 TABLET | Refills: 10 | Status: SHIPPED | OUTPATIENT
Start: 2019-11-19 | End: 2020-10-19 | Stop reason: SDUPTHER

## 2019-11-19 RX ORDER — ACETAMINOPHEN 325 MG/1
650 TABLET ORAL EVERY 6 HOURS PRN
Qty: 30 TABLET
Start: 2019-11-19 | End: 2019-12-18

## 2019-11-19 RX ORDER — DOCUSATE SODIUM 100 MG/1
100 CAPSULE, LIQUID FILLED ORAL 2 TIMES DAILY
Qty: 56 CAPSULE | Refills: 4 | Status: SHIPPED | OUTPATIENT
Start: 2019-11-19 | End: 2019-12-18

## 2019-11-19 RX ORDER — DIPHENOXYLATE HYDROCHLORIDE AND ATROPINE SULFATE 2.5; .025 MG/1; MG/1
1 TABLET ORAL DAILY
Qty: 90 TABLET | Refills: 0 | Status: SHIPPED | OUTPATIENT
Start: 2019-11-19 | End: 2020-12-05

## 2019-11-19 RX ORDER — MELATONIN
1000 DAILY
Qty: 90 TABLET | Refills: 0 | Status: SHIPPED | OUTPATIENT
Start: 2019-11-19 | End: 2020-02-26

## 2019-11-19 RX ORDER — LISINOPRIL 10 MG/1
10 TABLET ORAL DAILY
Qty: 30 TABLET | Refills: 2 | Status: SHIPPED | OUTPATIENT
Start: 2019-11-19 | End: 2020-10-13

## 2019-11-19 NOTE — LETTER
To whom it may concern:    Please advised that patient Juan Bolanos ( 1965) only needs to check blood sugars every 6 months  Any questions, please feel free to call our office at [phone]      Sincerely,        Kip HARRISON

## 2019-11-19 NOTE — PATIENT INSTRUCTIONS
Check blood pressure regularly  Purchase home monitoring of blood pressure device if insurance does not cover  Eat a healthy diet, reviewed DASH diet  Do not add salt salt to food  Reduce saturated fats  Encourage physical activity, 30 minutes daily  Limit alcohol use  Smoking cessation if applicable  Stress reduction  Where a medical alert identification to carry  Check your feet each day for sores, make sure your socks and shoes fit correctly  Did not trim year nails  Established with a podiatrist   Maintain a healthy weight, this can help you control her diabetes  Follow a proper meal plan  Keep track of her carbohydrates, eat low-fat foods, low-salt, high-fiber foods, limit alcohol  Exercise can help her blood sugar level study, decrease her risk of heart disease, and help you lose weight  Smoking cessation advised if applicable  Good blood pressure control is recommended  A normal blood pressure is 119/78 or lower  In neural blood pressure can help prevent certain complications from diabetes  Recommend yearly eye exams to assess for retinopathy  Consider vaccination against flu, pneumonia, and hepatitis

## 2019-11-19 NOTE — PROGRESS NOTES
[address]       NAME: Mahamed Mcgarry is a 48 y o  male  : 1965    MRN: [id number]  DATE: 2019  TIME: 1:54 PM    Assessment and Plan   Diagnoses and all orders for this visit:    Need for influenza vaccination  -     Cancel: FLU VACCINE RIV3 PRESERVATIVE FREE IM  -     influenza vaccine, [id number], quadrivalent, recombinant, PF, 0 5 mL, for patients 18 yr+ (FLUBLOK)    Seizure disorder ([facility] [address] )    Sepsis due to urinary tract infection ([facility] [address] )  -     acetaminophen (TYLENOL) 325 mg tablet; Take 2 tablets (650 mg total) by mouth every 6 (six) hours as needed for fever    Other constipation    Diabetes mellitus due to underlying condition with hyperosmolarity without coma, without long-term current use of insulin (HCC)  -     calcium carbonate (OYSTER SHELL,OSCAL) 500 mg; Take 1 tablet by mouth 3 (three) times a day  -     metFORMIN (GLUCOPHAGE) 1000 MG tablet; Take 1 tablet (1,000 mg total) by mouth 2 (two) times a day with meals  -     multivitamin (THERAGRAN) TABS; Take 1 tablet by mouth daily    Hyperlipidemia, unspecified hyperlipidemia type  -     calcium carbonate (OYSTER SHELL,OSCAL) 500 mg; Take 1 tablet by mouth 3 (three) times a day  -     multivitamin (THERAGRAN) TABS; Take 1 tablet by mouth daily  -     simvastatin (ZOCOR) 40 mg tablet; Take 1 tablet (40 mg total) by mouth daily    Vitamin D deficiency  -     cholecalciferol (VITAMIN D3) 1,000 units tablet; Take 1 tablet (1,000 Units total) by mouth daily    Constipation, unspecified constipation type  -     docusate sodium (COLACE) 100 mg capsule; Take 1 capsule (100 mg total) by mouth 2 (two) times a day 8 AM and 8 PM    Seborrheic dermatitis of scalp  -     ketoconazole (NIZORAL) 2 % shampoo; Wash hair and sideburns at least 3 times per week    Hypothyroidism due to Hashimoto's thyroiditis  -     levothyroxine 125 mcg tablet;  Take 1 tablet (125 mcg total) by mouth daily Take at 8 AM    Hypertension, unspecified type  - lisinopril (ZESTRIL) 10 mg tablet; Take 1 tablet (10 mg total) by mouth daily Take at 8 AM  -     multivitamin (THERAGRAN) TABS; Take 1 tablet by mouth daily    Allergic rhinitis, unspecified seasonality, unspecified trigger  -     loratadine (ALLERGY) 10 mg tablet; Take 1 tablet (10 mg total) by mouth daily Take 1 tablet by mouth daily PRN    Seizure (HCC)  -     multivitamin (THERAGRAN) TABS; Take 1 tablet by mouth daily    Neuropathy  -     multivitamin (THERAGRAN) TABS; Take 1 tablet by mouth daily    B12 deficiency  -     vitamin B-12 (VITAMIN B-12) 1,000 mcg tablet; TAKE 1 TABLET BY MOUTH EVERY OTHER DAY (8AM)    Encounter for support and coordination of transition of care        No problem-specific Assessment & Plan notes found for this encounter  Patient Instructions           Chief Complaint     Chief Complaint   Patient presents with    Transition of Care Management    Twitching         History of Present Illness         [employer] presents today with his caregiver for transition of care/follow-up  He was seen in emergency room in September after a witnessed grand mal seizure lasting approximately 3-5 minutes  He does have a known history of seizure disorder, prior to this incident he had not had a breakthrough seizure in over 10 years  Caregiver reports that 2 weeks after initial event he had another recurrence of seizure activity  Patient's caregiver reports that following these incidents while medications were being adjusted he demonstrated violent outbursts at home  She reports that he had been scheduled to see Neurology last month, but neurology called and canceled this appointment, referring them to epilepsy specialist instead  Patient caregiver reports that this appointment was missed due to due to [employer] having intense violent outburst the morning of scheduled appointment, forcing her to reschedule  She reports that he is scheduled to see epilepsy specialist on December 5th    She reports that [place] seizure medications have been changed in the last few months, and that she was told by neurology to give extra doses of Keppra as needed for [place] twitching/seizure activity  She is requesting a refill today as she will not have enough to get her through to the next appointment scheduled December 5th with epilepsy specialist   After some discussion advise patient would like to hold off, suggested she call to run these extra doses by Neurology, as they may want to increase dosage  Did advise that if she runs into any road block she can certainly call as we do not want them running out of medication  She is agreeable to this and states that she will call them when she leaves the appointment today  She is requesting Ativan for Jackelin Cain "for p r n  Emergency use"  Will hold off on this for now and defer to Neurology  Patient is DM2, non insulin dependant  She does not monitor his blood sugars daily at home but he has fingerstick blood glocose done in doctor's office averaging between 108-120  Caregiver is requesting a note for patient stating he does not have to check blood sugar daily  Patient's blood sugars do seem stable so I am agreeable to this  Educated caregiver on signs and symptoms of hyperglycemia that should be reported to doctor immediately;  Weakness, hunger, headache, shakiness sweating and to consume several pieces of candy or 1/2 cup OJ , she verbalized understanding  Review of Systems   Review of Systems   Constitutional: Negative for chills, fatigue, fever and unexpected weight change  HENT: Negative for postnasal drip, sinus pressure and sore throat  Eyes: Negative for discharge  Respiratory: Negative for chest tightness, shortness of breath and wheezing  Cardiovascular: Negative for chest pain  Gastrointestinal: Negative for constipation, diarrhea and vomiting  Musculoskeletal: Negative for arthralgias  Skin: Negative for rash     Neurological: Negative for dizziness and syncope  Psychiatric/Behavioral: Positive for agitation          Per caregiver, reports symptoms have stopped         Current Medications       Current Outpatient Medications:     acetaminophen (TYLENOL) 325 mg tablet, Take 2 tablets (650 mg total) by mouth every 6 (six) hours as needed for fever, Disp: 30 tablet, Rfl:     benztropine (COGENTIN) 0 5 mg tablet, TAKE 1 TABLET BY MOUTH TWICE A DAY (8AM,8PM), Disp: 56 tablet, Rfl: 10    bisacodyl (DULCOLAX) 10 mg suppository, Insert 1 suppository (10 mg total) into the rectum daily as needed for constipation, Disp: , Rfl:     calcium carbonate (OYSTER SHELL,OSCAL) 500 mg, TAKE 1 TABLET BY MOUTH 3 TIMES A DAY (8AM,2PM,8PM), Disp: 84 each, Rfl: 10    calcium carbonate (OYSTER SHELL,OSCAL) 500 mg, Take 1 tablet by mouth 3 (three) times a day, Disp: 270 tablet, Rfl: 0    cholecalciferol (VITAMIN D3) 1,000 units tablet, Take 1 tablet (1,000 Units total) by mouth daily, Disp: 90 tablet, Rfl: 0    docusate sodium (COLACE) 100 mg capsule, Take 1 capsule (100 mg total) by mouth 2 (two) times a day 8 AM and 8 PM, Disp: 56 capsule, Rfl: 4    hydrOXYzine pamoate (VISTARIL) 25 mg capsule, TAKE 2 CAPSULES (50MG) BY MOUTH 3 TIMES A DAY (8AM,2PM,8PM), Disp: 168 capsule, Rfl: 5    ketoconazole (NIZORAL) 2 % shampoo, Wash hair and sideburns at least 3 times per week, Disp: 120 mL, Rfl: 3    lacosamide (VIMPAT) 150 mg tablet, Take 1 tablet (150 mg total) by mouth every 12 (twelve) hours, Disp: 60 tablet, Rfl: 0    levETIRAcetam (KEPPRA) 750 mg tablet, Take 1 tablet (750 mg total) by mouth 2 (two) times a day, Disp: 60 tablet, Rfl: 0    levothyroxine 125 mcg tablet, Take 1 tablet (125 mcg total) by mouth daily Take at 8 AM, Disp: 12 tablet, Rfl: 10    lisinopril (ZESTRIL) 10 mg tablet, Take 1 tablet (10 mg total) by mouth daily Take at 8 AM, Disp: 30 tablet, Rfl: 2    loratadine (ALLERGY) 10 mg tablet, Take 1 tablet (10 mg total) by mouth daily Take 1 tablet by mouth daily PRN, Disp: 90 tablet, Rfl: 0    metFORMIN (GLUCOPHAGE) 1000 MG tablet, Take 1 tablet (1,000 mg total) by mouth 2 (two) times a day with meals, Disp: 180 tablet, Rfl: 1    multivitamin (THERAGRAN) TABS, Take 1 tablet by mouth daily, Disp: 90 tablet, Rfl: 0    QUEtiapine (SEROquel XR) 200 mg 24 hr tablet, TAKE 1 TABLET BY MOUTH TWICE A DAY (8AM,2PM), Disp: 16 tablet, Rfl: 10    QUEtiapine (SEROquel XR) 400 mg 24 hr tablet, TAKE 1 TABLET BY MOUTH AT BEDTIME (8PM) (DX: ANTIPSYCHOTIC), Disp: 28 tablet, Rfl: 10    simvastatin (ZOCOR) 40 mg tablet, Take 1 tablet (40 mg total) by mouth daily, Disp: 28 tablet, Rfl: 5    vitamin B-12 (VITAMIN B-12) 1,000 mcg tablet, TAKE 1 TABLET BY MOUTH EVERY OTHER DAY (8AM), Disp: 14 tablet, Rfl: 10    Current Allergies     Allergies as of 11/19/2019 - Reviewed 11/19/2019   Allergen Reaction Noted    Erythromycin  11/22/2013    Penicillins  11/22/2013            The following portions of the patient's history were reviewed and updated as appropriate: allergies, current medications, past family history, past medical history, past social history, past surgical history and problem list      Past Medical History:   Diagnosis Date    [facility] 9/24/2019    CP (cerebral palsy) (HCC)     Disease of thyroid gland     Hypertension     Kidney failure     Osteoporosis     Psychiatric disorder     Scoliosis     Seizures [facility]     UTI (urinary tract infection)        Past Surgical History:   Procedure Laterality Date    APPENDECTOMY         History reviewed  No pertinent family history  Medications have been verified          Objective   /90 (BP Location: Left arm, Patient Position: Sitting, Cuff Size: Standard)   Pulse 94   Temp 98 1 °F (36 7 °C) (Temporal)   Resp 18   Ht 5' 10" (1 778 m)   Wt 76 2 kg (168 lb)   SpO2 97%   BMI 24 11 kg/m²        Physical Exam     Physical Exam   Constitutional: He is oriented to person, place, and time  He appears well-developed  No distress  HENT:   Right Ear: External ear normal    Left Ear: External ear normal    Nose: Nose normal    Mouth/Throat: Oropharynx is clear and moist    Eyes: Pupils are equal, round, and reactive to light  EOM are normal    Neck: Normal range of motion  Neck supple  Cardiovascular: Normal rate, regular rhythm, normal heart sounds and intact distal pulses  Pulmonary/Chest: Effort normal and breath sounds normal    Abdominal: Soft  Musculoskeletal: Normal range of motion  Lymphadenopathy:     He has no cervical adenopathy  Neurological: He is alert and oriented to person, place, and time  Skin: Skin is warm and dry  Capillary refill takes less than 2 seconds  Psychiatric: He has a normal mood and affect  His behavior is normal    Nursing note and vitals reviewed

## 2019-11-20 DIAGNOSIS — G40.309 GENERALIZED CONVULSIVE EPILEPSY (HCC): ICD-10-CM

## 2019-11-20 DIAGNOSIS — J30.9 ALLERGIC RHINITIS, UNSPECIFIED SEASONALITY, UNSPECIFIED TRIGGER: ICD-10-CM

## 2019-11-20 DIAGNOSIS — G40.909 SEIZURE DISORDER (HCC): ICD-10-CM

## 2019-11-20 RX ORDER — LORATADINE 10 MG/1
10 TABLET ORAL DAILY
Qty: 90 TABLET | Refills: 0 | Status: SHIPPED | OUTPATIENT
Start: 2019-11-20 | End: 2020-11-05 | Stop reason: ALTCHOICE

## 2019-11-20 NOTE — TELEPHONE ENCOUNTER
Main Samueljose pt's caregiver called requesting keppra and vimpat refill be sent to [facility] pharmacy  States that keppra was added recently when pt was in the hospital and  depakote and zonisamide was discontinued  Also states that the physician at the hospital, advised pt's sister to give 1 extra dose of keppra daily if twitching gets so bad  Pt had bad jerky movements over the weekend, "not grand mal seizure just jerky movements like twitching over the weekend so pt's sister gave 1 extra dose of keppra on Friday, Sat and Sun  Yesterday, "twitching was so bad where pt's  knees buckled"  Pt was given 1 extra dose of keppra around 5 pm and "it controlled it"  This morning, pt was "jerking like twitching a little bit", no extra dose was given  She feels that keppra needs to be increased  Rx entered  If agreeable, pls sign off  Pls adjust rx if you want to increase keppra  Do you want to order keppra and vimpat level?      [facility] appt scheduled derick/ Rajiv Plummer on 12/5/19                  [phone] ok to leave detailed message

## 2019-11-21 RX ORDER — LEVETIRACETAM 750 MG/1
750 TABLET ORAL 2 TIMES DAILY
Qty: 60 TABLET | Refills: 0 | Status: SHIPPED | OUTPATIENT
Start: 2019-11-21 | End: 2019-12-13 | Stop reason: SDUPTHER

## 2019-11-21 RX ORDER — LACOSAMIDE 150 MG/1
150 TABLET ORAL EVERY 12 HOURS SCHEDULED
Qty: 60 TABLET | Refills: 0 | Status: SHIPPED | OUTPATIENT
Start: 2019-11-21 | End: 2019-12-13 | Stop reason: SDUPTHER

## 2019-11-21 NOTE — TELEPHONE ENCOUNTER
Called and advised pt's caregiver of all of the below  She verbalized clear understanding of all instructions  States that she will make sure that pt does not miss his appt w/ Maxx Yang on 12/5/19 at 2:45 pm   Pt will get bw done tmrw  Pt goes to [address] [place] [address] lab [facility]

## 2019-11-21 NOTE — TELEPHONE ENCOUNTER
Refilling lacosamide and levetiracetam with 30 day supply to get him to f/u appointment  Ordering lab work to be done asap  Please emphasize importance of showing up for the scheduled visit in Dec as we cannot manage possible worsening seizures if we haven't yet seen him in the office  Diagnosed with tme in hospital in Oct with marv and was transitioned off valproate and gabapentin   Discharged on lacosamide and levetiracetam

## 2019-11-25 ENCOUNTER — TRANSCRIBE ORDERS (OUTPATIENT)
Dept: LAB | Facility: CLINIC | Age: 54
End: 2019-11-25

## 2019-11-25 ENCOUNTER — APPOINTMENT (OUTPATIENT)
Dept: LAB | Facility: CLINIC | Age: 54
End: 2019-11-25
Payer: MEDICARE

## 2019-11-25 DIAGNOSIS — G40.909 SEIZURE DISORDER (HCC): ICD-10-CM

## 2019-11-25 DIAGNOSIS — N17.9 AKI (ACUTE KIDNEY INJURY) (HCC): Chronic | ICD-10-CM

## 2019-11-25 DIAGNOSIS — G40.309 GENERALIZED CONVULSIVE EPILEPSY (HCC): ICD-10-CM

## 2019-11-25 DIAGNOSIS — R56.9 SEIZURE (HCC): ICD-10-CM

## 2019-11-25 LAB
ALBUMIN SERPL BCP-MCNC: 4 G/DL (ref 3.5–5)
ALP SERPL-CCNC: 87 U/L (ref 46–116)
ALT SERPL W P-5'-P-CCNC: 55 U/L (ref 12–78)
ANION GAP SERPL CALCULATED.3IONS-SCNC: 5 MMOL/L (ref 4–13)
AST SERPL W P-5'-P-CCNC: 28 U/L (ref 5–45)
BACTERIA UR QL AUTO: ABNORMAL /HPF
BILIRUB SERPL-MCNC: 0.36 MG/DL (ref 0.2–1)
BILIRUB UR QL STRIP: NEGATIVE
BUN SERPL-MCNC: 24 MG/DL (ref 5–25)
CALCIUM SERPL-MCNC: 9.6 MG/DL (ref 8.3–10.1)
CHLORIDE SERPL-SCNC: 106 MMOL/L (ref 100–108)
CLARITY UR: CLEAR
CO2 SERPL-SCNC: 29 MMOL/L (ref 21–32)
COLOR UR: YELLOW
CREAT SERPL-MCNC: 0.66 MG/DL (ref 0.6–1.3)
CREAT UR-MCNC: 99.5 MG/DL
GFR SERPL CREATININE-BSD FRML MDRD: 111 ML/MIN/1.73SQ M
GLUCOSE P FAST SERPL-MCNC: 115 MG/DL (ref 65–99)
GLUCOSE UR STRIP-MCNC: NEGATIVE MG/DL
HGB UR QL STRIP.AUTO: NEGATIVE
HYALINE CASTS #/AREA URNS LPF: ABNORMAL /LPF
KETONES UR STRIP-MCNC: NEGATIVE MG/DL
LEUKOCYTE ESTERASE UR QL STRIP: ABNORMAL
MICROALBUMIN UR-MCNC: 15.1 MG/L (ref 0–20)
MICROALBUMIN/CREAT 24H UR: 15 MG/G CREATININE (ref 0–30)
NITRITE UR QL STRIP: NEGATIVE
NON-SQ EPI CELLS URNS QL MICRO: ABNORMAL /HPF
PH UR STRIP.AUTO: 6 [PH]
POTASSIUM SERPL-SCNC: 3.9 MMOL/L (ref 3.5–5.3)
PROT SERPL-MCNC: 7.7 G/DL (ref 6.4–8.2)
PROT UR STRIP-MCNC: NEGATIVE MG/DL
RBC #/AREA URNS AUTO: ABNORMAL /HPF
SODIUM SERPL-SCNC: 140 MMOL/L (ref 136–145)
SP GR UR STRIP.AUTO: 1.02 (ref 1–1.03)
UROBILINOGEN UR QL STRIP.AUTO: 0.2 E.U./DL
VALPROATE SERPL-MCNC: <3 UG/ML (ref 50–100)
WBC #/AREA URNS AUTO: ABNORMAL /HPF

## 2019-11-25 PROCEDURE — 81001 URINALYSIS AUTO W/SCOPE: CPT | Performed by: FAMILY MEDICINE

## 2019-11-25 PROCEDURE — 87086 URINE CULTURE/COLONY COUNT: CPT | Performed by: FAMILY MEDICINE

## 2019-11-25 PROCEDURE — 80177 DRUG SCRN QUAN LEVETIRACETAM: CPT

## 2019-11-25 PROCEDURE — 82043 UR ALBUMIN QUANTITATIVE: CPT | Performed by: FAMILY MEDICINE

## 2019-11-25 PROCEDURE — 80339 ANTIEPILEPTICS NOS 1-3: CPT

## 2019-11-25 PROCEDURE — 82570 ASSAY OF URINE CREATININE: CPT | Performed by: FAMILY MEDICINE

## 2019-11-25 PROCEDURE — 80053 COMPREHEN METABOLIC PANEL: CPT

## 2019-11-25 PROCEDURE — 80164 ASSAY DIPROPYLACETIC ACD TOT: CPT

## 2019-11-25 PROCEDURE — 36415 COLL VENOUS BLD VENIPUNCTURE: CPT

## 2019-11-26 LAB — BACTERIA UR CULT: NORMAL

## 2019-11-26 RX ORDER — ZONISAMIDE 100 MG/1
CAPSULE ORAL
Qty: 168 CAPSULE | Refills: 10 | OUTPATIENT
Start: 2019-11-26

## 2019-11-27 LAB
LACOSAMIDE SERPL-MCNC: 10.9 UG/ML (ref 5–10)
LEVETIRACETAM SERPL-MCNC: 14.1 UG/ML (ref 10–40)

## 2019-12-02 ENCOUNTER — PATIENT OUTREACH (OUTPATIENT)
Dept: CASE MANAGEMENT | Facility: OTHER | Age: 54
End: 2019-12-02

## 2019-12-02 NOTE — PROGRESS NOTES
CM spoke with Job Reaves,  at 's    Team was sent to visit with patient and CG at the home and they reported that there was no evidence of neglect or that the patient was in harm

## 2019-12-04 DIAGNOSIS — R56.9 SEIZURE (HCC): ICD-10-CM

## 2019-12-04 RX ORDER — ZONISAMIDE 100 MG/1
CAPSULE ORAL
Qty: 168 CAPSULE | Refills: 10 | OUTPATIENT
Start: 2019-12-04

## 2019-12-05 ENCOUNTER — TELEPHONE (OUTPATIENT)
Dept: NEUROLOGY | Facility: CLINIC | Age: 54
End: 2019-12-05

## 2019-12-05 NOTE — TELEPHONE ENCOUNTER
Patient's care taker Trina Mcguire called at 2:15 stating their car broke down on their way to apt today 12/5 at 2:45 [facility]  Car needs repairs and would not be able to make apt today  Was looking to get patient in ASAP as this HFU has already been rescheduled, told them I would look into options and give them a call back  Called Jan Gallardo back and  offered them 11am tomorrow 12/6 in [place] with Enid Faith or possible appt with Dr Drew Woodall tomorrow in [place](Dr Drew Woodall was out today though and we are unsure if he will be in tomorrow)  Jan Gallardo said she didn't mind waiting til next week to get something a little closer to the home and an afternoon appt as that works best for them    Rescheduled patient with Kaela Hester in [facility] on Thursday 12/12/19 at 1 pm

## 2019-12-10 ENCOUNTER — TELEPHONE (OUTPATIENT)
Dept: NEUROLOGY | Facility: CLINIC | Age: 54
End: 2019-12-10

## 2019-12-11 ENCOUNTER — TELEPHONE (OUTPATIENT)
Dept: NEUROLOGY | Facility: CLINIC | Age: 54
End: 2019-12-11

## 2019-12-11 NOTE — TELEPHONE ENCOUNTER
I spoke with patients caregiver, and asked if we could move the patients appointment from Thursday 12/12 in [facility] with Bjial Charles to Friday 12/13 in [place] with Dr Jorge Khan  The caregiver felt this was acceptable and agreed to reschedule with Dr Jorge Khan on Friday 12/13/19

## 2019-12-12 DIAGNOSIS — R56.9 SEIZURE (HCC): ICD-10-CM

## 2019-12-12 RX ORDER — ZONISAMIDE 100 MG/1
CAPSULE ORAL
Qty: 168 CAPSULE | Refills: 10 | OUTPATIENT
Start: 2019-12-12

## 2019-12-13 ENCOUNTER — OFFICE VISIT (OUTPATIENT)
Dept: NEUROLOGY | Facility: CLINIC | Age: 54
End: 2019-12-13
Payer: MEDICARE

## 2019-12-13 VITALS — HEART RATE: 77 BPM | HEIGHT: 70 IN | WEIGHT: 170.9 LBS | BODY MASS INDEX: 24.47 KG/M2

## 2019-12-13 DIAGNOSIS — R56.9 SEIZURE (HCC): ICD-10-CM

## 2019-12-13 DIAGNOSIS — G40.309 GENERALIZED CONVULSIVE EPILEPSY (HCC): ICD-10-CM

## 2019-12-13 DIAGNOSIS — G40.909 SEIZURE DISORDER (HCC): Primary | ICD-10-CM

## 2019-12-13 DIAGNOSIS — G40.909 SEIZURE DISORDER (HCC): ICD-10-CM

## 2019-12-13 PROCEDURE — 99215 OFFICE O/P EST HI 40 MIN: CPT | Performed by: PSYCHIATRY & NEUROLOGY

## 2019-12-13 RX ORDER — LACOSAMIDE 150 MG/1
150 TABLET ORAL EVERY 12 HOURS SCHEDULED
Qty: 60 TABLET | Refills: 5 | Status: SHIPPED | OUTPATIENT
Start: 2019-12-13 | End: 2020-05-11

## 2019-12-13 RX ORDER — ZONISAMIDE 100 MG/1
600 CAPSULE ORAL DAILY
Qty: 180 CAPSULE | Refills: 0 | Status: SHIPPED | OUTPATIENT
Start: 2019-12-13 | End: 2019-12-18 | Stop reason: ALTCHOICE

## 2019-12-13 RX ORDER — DIVALPROEX SODIUM 500 MG
TABLET, EXTENDED RELEASE 24 HR ORAL
Qty: 90 TABLET | Refills: 11 | Status: SHIPPED | OUTPATIENT
Start: 2019-12-13 | End: 2020-11-02

## 2019-12-13 RX ORDER — LEVETIRACETAM 750 MG/1
TABLET ORAL
Qty: 90 TABLET | Refills: 11 | Status: SHIPPED | OUTPATIENT
Start: 2019-12-13 | End: 2020-10-14

## 2019-12-13 RX ORDER — ZONISAMIDE 100 MG/1
CAPSULE ORAL
Qty: 168 CAPSULE | Refills: 4 | OUTPATIENT
Start: 2019-12-13

## 2019-12-13 RX ORDER — DIVALPROEX SODIUM 500 MG
TABLET, EXTENDED RELEASE 24 HR ORAL
Qty: 90 TABLET | Refills: 11 | Status: SHIPPED | OUTPATIENT
Start: 2019-12-13 | End: 2019-12-13 | Stop reason: SDUPTHER

## 2019-12-13 NOTE — PATIENT INSTRUCTIONS
-- Continue to take Levetiracetam (Keppra), but give 1 5 tabs (1125 mg) twice a day  Continue to give Lacosamide (Vimpat) unchanged at 150 mg twice a day  -- Restart Depakote 500 mg (1 tab) nightly for 3 days, then give 500 mg (1 tab) twice a day for 1 week, then give 500 mg (1 tab) in the morning and 1000 mg (2 tabs) at night  -- Please get bloodwork in about 1 5 months  If he is still having very frequent seizures and/or myoclonic jerks, give our office a call and we may be able to increase his Divalproex further, as long as his platelets do not get too low

## 2019-12-13 NOTE — PROGRESS NOTES
Patient ID: Andrew Mehta is a 48 y o  male with cerebral palsy, generalized epilepsy, and behavioral disorder, who is presenting to Neurology office for evaluation of his seizures  Assessment/Plan:    Generalized convulsive epilepsy ([address] )  Based on the description of his episodes, he does appear to have generalized epilepsy  He is currently having very frequent myoclonic jerks, which is likely precipitated by the fact that his Depakote and zonisamide for stop the very abruptly  Unfortunately the lacosamide and levetiracetam have not been controlling his myoclonic jerks  Additionally, he has been having profound behavioral difficulty  It is likely that levetiracetam is exacerbating this behavioral difficulty, but also that he is no longer getting the mood stabilizing benefits of Depakote  Although Depakote can cause thrombocytopenia, he had been on this for a long period of time with stable platelets  He was otherwise sick at the time when his platelets dropped in the hospital, and although was reasonable to stop Depakote, he may be able to tolerate a lower dose  This has been quite helpful in the past for his seizures, so I think would be best to restart Depakote, and closely monitor his platelets  Overall, it would appear that the benefits and reduction of seizure frequency and improvement in his mood would outweigh the risks if his platelets remain low, but stable    --I will have him start taking Depakote 500 mg nightly for 3 days, then increase to 500 mg twice a day for 1 week, and then increase Depakote to be 500 mg in the morning and 1000 mg at night     --he will continue Vimpat unchanged  I will have him change his levetiracetam to be 1125 mg (1 5 tabs) twice a day  --I will have him get blood work after he has been on Depakote to recheck his levels and platelet count      I spent a total of 40 min with the patient with greater than 50% of that time spent counseling and coordinating his care, specifically discussing his diagnosis, medications, behaviors, and plan, as detailed above     He will return to the office in about 3 months  Subjective:    HPI  Current seizure medications:  1  Levetiracetam 750 milligrams 3 times a day  2  Lacosamide 150 milligrams twice a day  Other medications as per Epic    His seizures started when he was about 89 years old  Unfortunately a large portion of his history is not known  He is currently living with his caregiver, who is not aware of much of his prior history  Per prior notes, he was previously following with Dr Toña Sung for his seizures, and typically will have very frequent seizures  It is unclear all of the medications that he has been treated with previously, but he had been taking Depakote and zonisamide at high doses in the past   On these doses, he was seizure-free since 2015  Although he did have chronic thrombocytopenia, this had been stable, and he was doing well without seizures or significant side effects  In September 2015, he was admitted to the hospital because of toxic metabolic encephalopathy  During that time, his platelets were significantly lower than they had been previously  Because of concerns of his electrolyte abnormalities, his zonisamide was stopped and his Depakote was also stopped  He was put on lacosamide monotherapy at low dose, and discharge  He unfortunately had a breakthrough seizure shortly thereafter, and levetiracetam was added  On his current medications he has been doing quite poorly  He has not had any additional generalized tonic-clonic seizures, but is having very frequent myoclonic jerks, which will often cause him to fall  As result, he cannot walk alone because if he does he may have a myoclonic jerk and fall  Additionally, his behaviors are out of control he is frequently breaking doors, furniture, cursing, screaming, and yelling  He does not sleep very much at all    He does seem confused in does need help to do his ADLs  Event/Seizure semiology:  1  Staring spells  These are noted in previous notes, but his staff has not seen any of these episodes  2  Generalized tonic-clonic seizure: They will begin with very frequent myoclonic jerks, then causing him to fall to the floor, stiffen up, and shake for 5-6 minutes  Most recently occurred in 2019, as above  This was the first in many years  3  Myoclonic jerks  Will have brief muscle jerks of his arms or legs  These will occur multiple times per day (of note these were only occurring once every few months when he was on Depakote and zonisamide )    Special Features  Status epilepticus:  Unknown  Self Injury Seizures:  Unknown  Precipitating Factors:  None    Epilepsy Risk Factors:  unknown    Prior AEDs:  Valproate and Zonisamide (changed due to thrombocytopenia)    Prior Evaluation:  - MRI brain: none  - Routine EE2019: normal  - Video EEG: none available    His history was also obtained from his caregiver, who was present at today's visit  I reviewed prior notes including prior neurology notes, as documented in Epic/[employer] [facility], and summarized above  The following portions of the patient's history were reviewed and updated as appropriate: allergies, current medications, past family history, past medical history, past social history, past surgical history and problem list      Objective:    Pulse 77, height 5' 10" (1 778 m), weight 77 5 kg (170 lb 14 4 oz)  Physical Exam    Neurological Exam      ROS:    Review of Systems   Constitutional: Negative  Negative for appetite change and fever  HENT: Negative  Negative for hearing loss, tinnitus, trouble swallowing and voice change  Eyes: Negative  Negative for photophobia and pain  Respiratory: Negative  Negative for shortness of breath  Cardiovascular: Negative  Negative for palpitations  Gastrointestinal: Negative    Negative for nausea and vomiting  Endocrine: Negative  Negative for cold intolerance and heat intolerance  Genitourinary: Negative  Negative for dysuria, frequency and urgency  Musculoskeletal: Positive for gait problem  Negative for myalgias and neck pain  Falls   Skin: Negative  Negative for rash  Neurological: Positive for seizures  Negative for dizziness, tremors, syncope, facial asymmetry, speech difficulty, weakness, light-headedness, numbness and headaches  Twitching   Hematological: Negative  Does not bruise/bleed easily  Psychiatric/Behavioral: Positive for confusion  Negative for hallucinations and sleep disturbance  The patient is nervous/anxious            I personally reviewed the ROS that was entered by the medical assistant

## 2019-12-13 NOTE — TELEPHONE ENCOUNTER
[facility] called to clarify keppra and depakote scripts  she states that they have that pt is currently taking   depakote 500mg 2 tabs in am and 3 tabs at hs and keppra 750mg 1 tab bid  i made her aware that keppra was being increased to 1 5 tabs (1125mg) bid and per office note-Restart Depakote 500 mg (1 tab) nightly for 3 days, then give 500 mg (1 tab) twice a day for 1 week, then give 500 mg (1 tab) in the morning and 1000 mg (2 tabs) at night  she states that they would need new script for depakote stating the titration    new script entered for auth

## 2019-12-18 ENCOUNTER — OFFICE VISIT (OUTPATIENT)
Dept: FAMILY MEDICINE CLINIC | Facility: HOME HEALTHCARE | Age: 54
End: 2019-12-18
Payer: MEDICARE

## 2019-12-18 VITALS
OXYGEN SATURATION: 97 % | BODY MASS INDEX: 24.77 KG/M2 | HEIGHT: 70 IN | RESPIRATION RATE: 16 BRPM | TEMPERATURE: 97.2 F | DIASTOLIC BLOOD PRESSURE: 84 MMHG | HEART RATE: 94 BPM | SYSTOLIC BLOOD PRESSURE: 136 MMHG | WEIGHT: 173 LBS

## 2019-12-18 DIAGNOSIS — Z12.5 SCREENING PSA (PROSTATE SPECIFIC ANTIGEN): Primary | ICD-10-CM

## 2019-12-18 DIAGNOSIS — Z00.00 ANNUAL PHYSICAL EXAM: ICD-10-CM

## 2019-12-18 DIAGNOSIS — K59.00 CONSTIPATION, UNSPECIFIED CONSTIPATION TYPE: ICD-10-CM

## 2019-12-18 PROCEDURE — 99213 OFFICE O/P EST LOW 20 MIN: CPT | Performed by: FAMILY MEDICINE

## 2019-12-18 RX ORDER — POLYETHYLENE GLYCOL 3350 17 G/17G
17 POWDER, FOR SOLUTION ORAL DAILY
Qty: 14 EACH | Refills: 0 | Status: SHIPPED | OUTPATIENT
Start: 2019-12-18 | End: 2020-03-24 | Stop reason: SDUPTHER

## 2019-12-18 NOTE — PATIENT INSTRUCTIONS
Regular exercise and physical activity may help you control your weight  Diet and exercise play an important role in controlling your weight  Exercise strengthens your heart and improves your circulation  This lowers risks of heart disease  Exercise can lower your blood sugar level and help your insulin work better  This will cut down your risk of type 2 diabetes  Exercise can improve your mood and make you feel more relaxed  This can reduce your risk of depression  Where a medical alert identification to carry  Check your feet each day for sores, make sure your socks and shoes fit correctly  Did not trim year nails  Established with a podiatrist   Maintain a healthy weight, this can help you control her diabetes  Follow a proper meal plan  Keep track of her carbohydrates, eat low-fat foods, low-salt, high-fiber foods, limit alcohol  Exercise can help her blood sugar level study, decrease her risk of heart disease, and help you lose weight  Smoking cessation advised if applicable  Good blood pressure control is recommended  A normal blood pressure is 119/78 or lower  In neural blood pressure can help prevent certain complications from diabetes  Recommend yearly eye exams to assess for retinopathy  Consider vaccination against flu, pneumonia, and hepatitis

## 2019-12-18 NOTE — PROGRESS NOTES
[address]       NAME: Fanny Vega is a 48 y o  male  : 1965    MRN: [id number]  DATE: 2019  TIME: 2:38 PM    Assessment and Plan   Diagnoses and all orders for this visit:    Screening PSA (prostate specific antigen)  -     PSA, total and free; Future    Constipation, unspecified constipation type  Comments:  Increase fluids, can try warm prune juice  MiraLax ordered p r n  Orders:  -     polyethylene glycol (MIRALAX) 17 g packet; Take 17 g by mouth daily    Annual physical exam        No problem-specific Assessment & Plan notes found for this encounter  Patient Instructions           Chief Complaint   No chief complaint on file  History of Present Illness       Minal Jack presents today with his caregiver for annual physical/follow-up  He does have a known history of seizure disorder, is followed by Neurology  Recently had Depakote adjusted, is scheduled to see them again in 6 weeks  Caregiver reports that seizure activity has ceased, has not had any episodes of twitching, no more violent outbursts  Overall feeling well  Presents today for physical, PSA to be drawn  Will hold off on prostate exam pending results  Denies any problems with urine stream or urinary retention  No problems initiating flow  Eating and sleeping well  Routine screening blood work to be drawn at next follow-up visit  Does have occasional constipation, denies any bloody stools  Review of Systems   Review of Systems   Constitutional: Negative for chills, fatigue, fever and unexpected weight change  HENT: Negative for postnasal drip, sinus pressure and sore throat  Eyes: Negative for discharge  Respiratory: Negative for chest tightness, shortness of breath and wheezing  Cardiovascular: Negative for chest pain  Gastrointestinal: Negative for constipation, diarrhea and vomiting  Musculoskeletal: Negative for arthralgias  Skin: Negative for rash     Neurological: Negative for dizziness and syncope  Psychiatric/Behavioral: Negative  Current Medications       Current Outpatient Medications:     benztropine (COGENTIN) 0 5 mg tablet, TAKE 1 TABLET BY MOUTH TWICE A DAY (8AM,8PM), Disp: 56 tablet, Rfl: 10    bisacodyl (DULCOLAX) 10 mg suppository, Insert 1 suppository (10 mg total) into the rectum daily as needed for constipation, Disp: , Rfl:     calcium carbonate (OYSTER SHELL,OSCAL) 500 mg, TAKE 1 TABLET BY MOUTH 3 TIMES A DAY (8AM,2PM,8PM), Disp: 84 each, Rfl: 10    cholecalciferol (VITAMIN D3) 1,000 units tablet, Take 1 tablet (1,000 Units total) by mouth daily, Disp: 90 tablet, Rfl: 0    DEPAKOTE  MG 24 hr tablet, 1 tab hs for 3 days, then 1 tab bid for 1 week, then 1 tab in am and 2 tabs hs    Brand necessary, Disp: 90 tablet, Rfl: 11    hydrOXYzine pamoate (VISTARIL) 25 mg capsule, TAKE 2 CAPSULES (50MG) BY MOUTH 3 TIMES A DAY (8AM,2PM,8PM), Disp: 168 capsule, Rfl: 5    ketoconazole (NIZORAL) 2 % shampoo, Wash hair and sideburns at least 3 times per week, Disp: 120 mL, Rfl: 3    lacosamide (VIMPAT) 150 mg tablet, Take 1 tablet (150 mg total) by mouth every 12 (twelve) hours, Disp: 60 tablet, Rfl: 5    levETIRAcetam (KEPPRA) 750 mg tablet, Take 1125 mg (1 5 tabs) twice a day , Disp: 90 tablet, Rfl: 11    levothyroxine 125 mcg tablet, Take 1 tablet (125 mcg total) by mouth daily Take at 8 AM, Disp: 12 tablet, Rfl: 10    lisinopril (ZESTRIL) 10 mg tablet, Take 1 tablet (10 mg total) by mouth daily Take at 8 AM, Disp: 30 tablet, Rfl: 2    loratadine (ALLERGY) 10 mg tablet, Take 1 tablet (10 mg total) by mouth daily, Disp: 90 tablet, Rfl: 0    metFORMIN (GLUCOPHAGE) 1000 MG tablet, Take 1 tablet (1,000 mg total) by mouth 2 (two) times a day with meals, Disp: 180 tablet, Rfl: 1    multivitamin (THERAGRAN) TABS, Take 1 tablet by mouth daily, Disp: 90 tablet, Rfl: 0    QUEtiapine (SEROquel XR) 200 mg 24 hr tablet, TAKE 1 TABLET BY MOUTH TWICE A DAY (8AM,2PM), Disp: 16 tablet, Rfl: 10    QUEtiapine (SEROquel XR) 400 mg 24 hr tablet, TAKE 1 TABLET BY MOUTH AT BEDTIME (8PM) (DX: ANTIPSYCHOTIC), Disp: 28 tablet, Rfl: 10    simvastatin (ZOCOR) 40 mg tablet, Take 1 tablet (40 mg total) by mouth daily, Disp: 28 tablet, Rfl: 5    vitamin B-12 (VITAMIN B-12) 1,000 mcg tablet, TAKE 1 TABLET BY MOUTH EVERY OTHER DAY (8AM), Disp: 14 tablet, Rfl: 10    polyethylene glycol (MIRALAX) 17 g packet, Take 17 g by mouth daily, Disp: 14 each, Rfl: 0    Current Allergies     Allergies as of 12/18/2019 - Reviewed 12/18/2019   Allergen Reaction Noted    Erythromycin  11/22/2013    Penicillins  11/22/2013            The following portions of the patient's history were reviewed and updated as appropriate: allergies, current medications, past family history, past medical history, past social history, past surgical history and problem list      Past Medical History:   Diagnosis Date    [facility]) ([facility] ) 9/24/2019    CP [facility]cerebral palsy) [facility] )     Depression     Diabetes ([facility] )     Disease of thyroid gland     Gait disorder     Hyperlipidemia     Hypertension     Kidney failure     Kidney stones     Osteoporosis     Psychiatric disorder     Scoliosis     Seizures ([facility] )     Thyroid disease     UTI (urinary tract infection)        Past Surgical History:   Procedure Laterality Date    APPENDECTOMY         History reviewed  No pertinent family history  Medications have been verified  Objective   /84 (BP Location: Right arm, Patient Position: Sitting, Cuff Size: Large)   Pulse 94   Temp (!) 97 2 °F (36 2 °C) (Tympanic)   Resp 16   Ht 5' 10" (1 778 m)   Wt 78 5 kg (173 lb)   SpO2 97%   BMI 24 82 kg/m²        Physical Exam     Physical Exam   Constitutional: He is oriented to person, place, and time  He appears well-developed  No distress     HENT:   Right Ear: External ear normal    Left Ear: External ear normal    Nose: Nose normal  Mouth/Throat: Oropharynx is clear and moist    Eyes: Pupils are equal, round, and reactive to light  EOM are normal    Neck: Normal range of motion  Neck supple  Cardiovascular: Normal rate, regular rhythm, normal heart sounds and intact distal pulses  Pulmonary/Chest: Effort normal and breath sounds normal    Abdominal: Soft  Bowel sounds are normal    Musculoskeletal: Normal range of motion  Lymphadenopathy:     He has no cervical adenopathy  Neurological: He is alert and oriented to person, place, and time  Skin: Skin is warm and dry  Capillary refill takes less than 2 seconds  Psychiatric: He has a normal mood and affect  His behavior is normal    Nursing note and vitals reviewed

## 2020-01-09 ENCOUNTER — EPISODE CHANGES (OUTPATIENT)
Dept: CASE MANAGEMENT | Facility: OTHER | Age: 55
End: 2020-01-09

## 2020-01-12 NOTE — ASSESSMENT & PLAN NOTE
Based on the description of his episodes, he does appear to have generalized epilepsy  He is currently having very frequent myoclonic jerks, which is likely precipitated by the fact that his Depakote and zonisamide for stop the very abruptly  Unfortunately the lacosamide and levetiracetam have not been controlling his myoclonic jerks  Additionally, he has been having profound behavioral difficulty  It is likely that levetiracetam is exacerbating this behavioral difficulty, but also that he is no longer getting the mood stabilizing benefits of Depakote  Although Depakote can cause thrombocytopenia, he had been on this for a long period of time with stable platelets  He was otherwise sick at the time when his platelets dropped in the hospital, and although was reasonable to stop Depakote, he may be able to tolerate a lower dose  This has been quite helpful in the past for his seizures, so I think would be best to restart Depakote, and closely monitor his platelets  Overall, it would appear that the benefits and reduction of seizure frequency and improvement in his mood would outweigh the risks if his platelets remain low, but stable    --I will have him start taking Depakote 500 mg nightly for 3 days, then increase to 500 mg twice a day for 1 week, and then increase Depakote to be 500 mg in the morning and 1000 mg at night     --he will continue Vimpat unchanged  I will have him change his levetiracetam to be 1125 mg (1 5 tabs) twice a day  --I will have him get blood work after he has been on Depakote to recheck his levels and platelet count

## 2020-01-16 LAB
LEFT EYE DIABETIC RETINOPATHY: NORMAL
RIGHT EYE DIABETIC RETINOPATHY: NORMAL

## 2020-02-25 ENCOUNTER — TRANSCRIBE ORDERS (OUTPATIENT)
Dept: LAB | Facility: CLINIC | Age: 55
End: 2020-02-25

## 2020-02-25 ENCOUNTER — APPOINTMENT (OUTPATIENT)
Dept: LAB | Facility: CLINIC | Age: 55
End: 2020-02-25
Payer: MEDICARE

## 2020-02-25 DIAGNOSIS — Z12.5 SCREENING PSA (PROSTATE SPECIFIC ANTIGEN): ICD-10-CM

## 2020-02-25 DIAGNOSIS — G40.309 GENERALIZED CONVULSIVE EPILEPSY (HCC): ICD-10-CM

## 2020-02-25 LAB
ALBUMIN SERPL BCP-MCNC: 3.9 G/DL (ref 3.5–5)
ALP SERPL-CCNC: 57 U/L (ref 46–116)
ALT SERPL W P-5'-P-CCNC: 22 U/L (ref 12–78)
ANION GAP SERPL CALCULATED.3IONS-SCNC: 5 MMOL/L (ref 4–13)
AST SERPL W P-5'-P-CCNC: 17 U/L (ref 5–45)
BILIRUB SERPL-MCNC: 0.25 MG/DL (ref 0.2–1)
BUN SERPL-MCNC: 26 MG/DL (ref 5–25)
CALCIUM SERPL-MCNC: 9.4 MG/DL (ref 8.3–10.1)
CHLORIDE SERPL-SCNC: 108 MMOL/L (ref 100–108)
CO2 SERPL-SCNC: 30 MMOL/L (ref 21–32)
CREAT SERPL-MCNC: 0.71 MG/DL (ref 0.6–1.3)
ERYTHROCYTE [DISTWIDTH] IN BLOOD BY AUTOMATED COUNT: 13.6 % (ref 11.6–15.1)
GFR SERPL CREATININE-BSD FRML MDRD: 108 ML/MIN/1.73SQ M
GLUCOSE P FAST SERPL-MCNC: 122 MG/DL (ref 65–99)
HCT VFR BLD AUTO: 39.2 % (ref 36.5–49.3)
HGB BLD-MCNC: 13.1 G/DL (ref 12–17)
MCH RBC QN AUTO: 31.1 PG (ref 26.8–34.3)
MCHC RBC AUTO-ENTMCNC: 33.4 G/DL (ref 31.4–37.4)
MCV RBC AUTO: 93 FL (ref 82–98)
PLATELET # BLD AUTO: 138 THOUSANDS/UL (ref 149–390)
PMV BLD AUTO: 12.7 FL (ref 8.9–12.7)
POTASSIUM SERPL-SCNC: 4.2 MMOL/L (ref 3.5–5.3)
PROT SERPL-MCNC: 7.4 G/DL (ref 6.4–8.2)
RBC # BLD AUTO: 4.21 MILLION/UL (ref 3.88–5.62)
SODIUM SERPL-SCNC: 143 MMOL/L (ref 136–145)
VALPROATE SERPL-MCNC: 89 UG/ML (ref 50–100)
WBC # BLD AUTO: 5.49 THOUSAND/UL (ref 4.31–10.16)

## 2020-02-25 PROCEDURE — 80164 ASSAY DIPROPYLACETIC ACD TOT: CPT

## 2020-02-25 PROCEDURE — 80235 DRUG ASSAY LACOSAMIDE: CPT

## 2020-02-25 PROCEDURE — 84154 ASSAY OF PSA FREE: CPT

## 2020-02-25 PROCEDURE — 85027 COMPLETE CBC AUTOMATED: CPT

## 2020-02-25 PROCEDURE — 84153 ASSAY OF PSA TOTAL: CPT

## 2020-02-25 PROCEDURE — 80177 DRUG SCRN QUAN LEVETIRACETAM: CPT

## 2020-02-25 PROCEDURE — 80053 COMPREHEN METABOLIC PANEL: CPT

## 2020-02-25 PROCEDURE — 36415 COLL VENOUS BLD VENIPUNCTURE: CPT

## 2020-02-26 DIAGNOSIS — E55.9 VITAMIN D DEFICIENCY: ICD-10-CM

## 2020-02-26 LAB
PSA FREE MFR SERPL: 28 %
PSA FREE SERPL-MCNC: 0.42 NG/ML
PSA SERPL-MCNC: 1.5 NG/ML (ref 0–4)

## 2020-02-26 RX ORDER — VITAMIN B COMPLEX
TABLET ORAL
Qty: 28 TABLET | Refills: 4 | Status: SHIPPED | OUTPATIENT
Start: 2020-02-26 | End: 2020-08-12

## 2020-02-27 LAB — LEVETIRACETAM SERPL-MCNC: 22.4 UG/ML (ref 10–40)

## 2020-02-28 LAB — LACOSAMIDE SERPL-MCNC: 5.5 UG/ML (ref 5–10)

## 2020-03-24 DIAGNOSIS — K59.00 CONSTIPATION, UNSPECIFIED CONSTIPATION TYPE: ICD-10-CM

## 2020-03-24 DIAGNOSIS — E78.5 HYPERLIPIDEMIA, UNSPECIFIED HYPERLIPIDEMIA TYPE: ICD-10-CM

## 2020-03-24 DIAGNOSIS — F41.9 ANXIETY: Primary | ICD-10-CM

## 2020-03-24 DIAGNOSIS — G23.2 PARKINSON'S PLUS SYNDROME (HCC): ICD-10-CM

## 2020-03-24 RX ORDER — HYDROXYZINE PAMOATE 25 MG/1
25 CAPSULE ORAL 3 TIMES DAILY PRN
Qty: 90 CAPSULE | Refills: 2 | Status: SHIPPED | OUTPATIENT
Start: 2020-03-24 | End: 2020-07-10 | Stop reason: DRUGHIGH

## 2020-03-24 RX ORDER — DOCUSATE SODIUM 100 MG/1
100 CAPSULE, LIQUID FILLED ORAL 2 TIMES DAILY
Qty: 60 CAPSULE | Refills: 2 | Status: SHIPPED | OUTPATIENT
Start: 2020-03-24 | End: 2020-11-02

## 2020-03-24 RX ORDER — SIMVASTATIN 40 MG
40 TABLET ORAL DAILY
Qty: 30 TABLET | Refills: 2 | Status: SHIPPED | OUTPATIENT
Start: 2020-03-24 | End: 2020-06-15

## 2020-03-24 RX ORDER — POLYETHYLENE GLYCOL 3350 17 G/17G
17 POWDER, FOR SOLUTION ORAL DAILY
Qty: 14 EACH | Refills: 2 | Status: SHIPPED | OUTPATIENT
Start: 2020-03-24 | End: 2020-06-18 | Stop reason: SDUPTHER

## 2020-03-25 RX ORDER — HYDROXYZINE PAMOATE 50 MG/1
CAPSULE ORAL
Qty: 84 CAPSULE | Refills: 10 | Status: SHIPPED | OUTPATIENT
Start: 2020-03-25 | End: 2022-03-29

## 2020-03-30 ENCOUNTER — TELEMEDICINE (OUTPATIENT)
Dept: NEUROLOGY | Facility: CLINIC | Age: 55
End: 2020-03-30
Payer: MEDICARE

## 2020-03-30 ENCOUNTER — TELEPHONE (OUTPATIENT)
Dept: NEUROLOGY | Facility: CLINIC | Age: 55
End: 2020-03-30

## 2020-03-30 DIAGNOSIS — D69.1 THROMBOCYTOPATHIA (HCC): ICD-10-CM

## 2020-03-30 DIAGNOSIS — G40.309 GENERALIZED CONVULSIVE EPILEPSY (HCC): Primary | Chronic | ICD-10-CM

## 2020-03-30 PROCEDURE — 99442 PR PHYS/QHP TELEPHONE EVALUATION 11-20 MIN: CPT | Performed by: PSYCHIATRY & NEUROLOGY

## 2020-03-30 RX ORDER — MIRTAZAPINE 7.5 MG/1
7.5 TABLET, FILM COATED ORAL
COMMUNITY
End: 2020-07-13 | Stop reason: ALTCHOICE

## 2020-03-30 RX ORDER — MIRTAZAPINE 15 MG/1
15 TABLET, FILM COATED ORAL
COMMUNITY
End: 2020-07-13 | Stop reason: ALTCHOICE

## 2020-03-30 NOTE — PROGRESS NOTES
Virtual Brief Visit    Patient ID: Odessa Dickerson is a 48 y o  male with palsy, generalized epilepsy, and behavioral disorder, who is returning to Neurology office for follow up of his seizures  Assessment/Plan:    Generalized convulsive epilepsy ([facility] )  Overall, his seizures and myoclonus have significantly improved with restarting his Depakote  He is tolerating this well without any side effects  His caregiver, who has worked with him for many years, now feels that he is back to his prior baseline  Because he is doing well, I will not make any changes to his medications today  He does have a longstanding history of thrombocytopenia, which may partly be due to his Depakote  In the past, his platelets were low, but stable around 110-120  He did have bloodwork on 2/25/2020, and his platelets were 426  Although this is low, this is not of concern currently  I will plan to recheck his platelets to assure they do not fall lower  -- he will continue Depakote, Lacosamide (Vimpat) and Levetiracetam (Keppra) unchanged  Thrombocytopathia ([facility] )  As noted above, his thrombocytopenia is likely partially due to his Depakote  Although his platelets are low, I would much more want to assure that he isn't having seizures and tolerate a low platelet level  Certainly if they would fall below 100 and consistently remain low, then we may need to consider changing his Depakote  He will Return in about 3 months (around 6/30/2020)  Reason for visit is follow up of epilepsy    Encounter provider Eric Batres MD    Provider located at [facility]  [facility] [address] [facility]  [address]      Recent Visits  No visits were found meeting these conditions     Showing recent visits within past 7 days and meeting all other requirements     Today's Visits  Date Type Provider Dept   03/30/20 Telemedicine Eric Batres  [address] Showing today's visits and meeting all other requirements     Future Appointments  Date Type Provider Dept   03/30/20 Telemedicine Brayan Miranda MD  Neuro [facility]   Showing future appointments within next 150 days and meeting all other requirements        After connecting through telephone, the patient was identified by name and date of birth  Jelena Malagon was informed that this is a telemedicine visit and that the visit is being conducted through telephone  My office door was closed  No one else was in the room  He acknowledged consent and understanding of privacy and security of the telephone platform  The patient has agreed to participate and understands they can discontinue the visit at any time  Patient is aware this is a billable service  Subjective  Jelena Malagon is a 48 y o  male who was last seen in the office on 12/13/2019  At that time, he was seen in follow up of a hospitalization and was having very severe and functionally limiting myoclonus  This had started when his seizure medications were significantly changed during his hospitalization  To try to improve his myoclonus, he was restarted on Depakote  Since his last visit, he has been doing really good  He has some mild twitching every now and again, mainly when he has anxiety  He did start taking Depakote at his last visit  She denies any side effects or problems  Overall, she is quite happy with how he is doing  History was obtained by his Caregiver, Errol Hameed Lipoma was present, but because of his intellectual disability, was not able to provide history himself  Current seizure medications:  1  Depakote  mg in the am and 1000 mg in the pm  2  Lacosamide 150 mg twice a day  3  Levetiracetam 1125 mg twice a day  Other medications as per Epic      Prior Medications: Zonisamide       Past Medical History:   Diagnosis Date    [facility] ) 9/24/2019    CP [facility]cere[facility] palsy) ([facility] )  Depression     Diabetes ([place] [address] )     Disease of thyroid gland     Gait disorder     Hyperlipidemia     Hypertension     Kidney failure     Kidney stones     Osteoporosis     Psychiatric disorder     Scoliosis     Seizures ([place] [address] )     Thyroid disease     UTI (urinary tract infection)        Past Surgical History:   Procedure Laterality Date    APPENDECTOMY         Current Outpatient Medications   Medication Sig Dispense Refill    benztropine (COGENTIN) 0 5 mg tablet TAKE 1 TABLET BY MOUTH TWICE A DAY (8AM,8PM) 56 tablet 10    bisacodyl (DULCOLAX) 10 mg suppository Insert 1 suppository (10 mg total) into the rectum daily as needed for constipation      calcium carbonate (OYSTER SHELL,OSCAL) 500 mg TAKE 1 TABLET BY MOUTH 3 TIMES A DAY (8AM,2PM,8PM) 84 each 10    cholecalciferol (VITAMIN D3) 25 mcg (1,000 units) tablet TAKE 1 TABLET BY MOUTH DAILY (8AM) (DX: VITAMIN DAILY DEFICIENCY) 28 tablet 4    DEPAKOTE  MG 24 hr tablet 1 tab hs for 3 days, then 1 tab bid for 1 week, then 1 tab in am and 2 tabs hs  Brand necessary 90 tablet 11    docusate sodium (COLACE) 100 mg capsule Take 1 capsule (100 mg total) by mouth 2 (two) times a day 8 AM and 8 PM 60 capsule 2    hydrOXYzine pamoate (VISTARIL) 25 mg capsule Take 1 capsule (25 mg total) by mouth 3 (three) times a day as needed for itching 90 capsule 2    hydrOXYzine pamoate (VISTARIL) 50 mg capsule TAKE 1 CAPSULE BY MOUTH 3 TIMES A DAY 84 capsule 10    ketoconazole (NIZORAL) 2 % shampoo Wash hair and sideburns at least 3 times per week 120 mL 3    lacosamide (VIMPAT) 150 mg tablet Take 1 tablet (150 mg total) by mouth every 12 (twelve) hours 60 tablet 5    levETIRAcetam (KEPPRA) 750 mg tablet Take 1125 mg (1 5 tabs) twice a day   90 tablet 11    levothyroxine 125 mcg tablet Take 1 tablet (125 mcg total) by mouth daily Take at 8 AM 12 tablet 10    lisinopril (ZESTRIL) 10 mg tablet Take 1 tablet (10 mg total) by mouth daily Take at 8 AM 30 tablet 2    loratadine (ALLERGY) 10 mg tablet Take 1 tablet (10 mg total) by mouth daily (Patient taking differently: Take 10 mg by mouth as needed ) 90 tablet 0    metFORMIN (GLUCOPHAGE) 1000 MG tablet Take 1 tablet (1,000 mg total) by mouth 2 (two) times a day with meals 180 tablet 1    mirtazapine (REMERON) 15 mg tablet Take 15 mg by mouth daily at bedtime      mirtazapine (REMERON) 7 5 MG tablet Take 7 5 mg by mouth daily at bedtime      multivitamin (THERAGRAN) TABS Take 1 tablet by mouth daily 90 tablet 0    polyethylene glycol (MIRALAX) 17 g packet Take 17 g by mouth daily 14 each 2    QUEtiapine (SEROquel XR) 200 mg 24 hr tablet TAKE 1 TABLET BY MOUTH TWICE A DAY (8AM,2PM) 16 tablet 10    QUEtiapine (SEROquel XR) 400 mg 24 hr tablet TAKE 1 TABLET BY MOUTH AT BEDTIME (8PM) (DX: ANTIPSYCHOTIC) 28 tablet 10    simvastatin (ZOCOR) 40 mg tablet Take 1 tablet (40 mg total) by mouth daily 30 tablet 2    vitamin B-12 (VITAMIN B-12) 1,000 mcg tablet TAKE 1 TABLET BY MOUTH EVERY OTHER DAY (8AM) 14 tablet 10     No current facility-administered medications for this visit  Allergies   Allergen Reactions    Erythromycin     Penicillins        Review of Systems      I spent 15 minutes with the patient during this visit

## 2020-03-30 NOTE — ASSESSMENT & PLAN NOTE
As noted above, his thrombocytopenia is likely partially due to his Depakote  Although his platelets are low, I would much more want to assure that he isn't having seizures and tolerate a low platelet level  Certainly if they would fall below 100 and consistently remain low, then we may need to consider changing his Depakote

## 2020-03-30 NOTE — TELEPHONE ENCOUNTER
----- Message from Jennifer Perry MD sent at 3/30/2020  2:30 PM EDT -----  Regarding: Follow up  Lauren,    I am finished with the AVS for Clyde Cevallos  Would you be able to mail it along with his lab slips to the address on file? Also, plan for 3 month follow up    Thanks!     Roni Yuan

## 2020-03-30 NOTE — PATIENT INSTRUCTIONS
-- he has been doing well without any significant myoclonus, so I will not make any changes to his medications today  -- Please get bloodwork drawn about 1 week before his next office appointment

## 2020-03-30 NOTE — ASSESSMENT & PLAN NOTE
Overall, his seizures and myoclonus have significantly improved with restarting his Depakote  He is tolerating this well without any side effects  His caregiver, who has worked with him for many years, now feels that he is back to his prior baseline  Because he is doing well, I will not make any changes to his medications today  He does have a longstanding history of thrombocytopenia, which may partly be due to his Depakote  In the past, his platelets were low, but stable around 110-120  He did have bloodwork on 2/25/2020, and his platelets were 660  Although this is low, this is not of concern currently  I will plan to recheck his platelets to assure they do not fall lower  -- he will continue Depakote, Lacosamide (Vimpat) and Levetiracetam (Keppra) unchanged

## 2020-05-08 DIAGNOSIS — E08.00 DIABETES MELLITUS DUE TO UNDERLYING CONDITION WITH HYPEROSMOLARITY WITHOUT COMA, WITHOUT LONG-TERM CURRENT USE OF INSULIN (HCC): ICD-10-CM

## 2020-05-08 DIAGNOSIS — G40.309 GENERALIZED CONVULSIVE EPILEPSY (HCC): ICD-10-CM

## 2020-05-11 RX ORDER — LACOSAMIDE 150 MG/1
TABLET, FILM COATED ORAL
Qty: 56 TABLET | Refills: 4 | Status: SHIPPED | OUTPATIENT
Start: 2020-05-11 | End: 2020-11-02

## 2020-06-12 DIAGNOSIS — E78.5 HYPERLIPIDEMIA, UNSPECIFIED HYPERLIPIDEMIA TYPE: ICD-10-CM

## 2020-06-15 RX ORDER — SIMVASTATIN 40 MG
TABLET ORAL
Qty: 28 TABLET | Refills: 4 | Status: SHIPPED | OUTPATIENT
Start: 2020-06-15 | End: 2020-12-05

## 2020-06-18 DIAGNOSIS — K59.00 CONSTIPATION, UNSPECIFIED CONSTIPATION TYPE: ICD-10-CM

## 2020-06-18 RX ORDER — POLYETHYLENE GLYCOL 3350 17 G/17G
17 POWDER, FOR SOLUTION ORAL DAILY
Qty: 14 EACH | Refills: 8 | Status: SHIPPED | OUTPATIENT
Start: 2020-06-18

## 2020-07-01 ENCOUNTER — APPOINTMENT (OUTPATIENT)
Dept: LAB | Facility: CLINIC | Age: 55
End: 2020-07-01
Payer: MEDICARE

## 2020-07-01 DIAGNOSIS — G40.309 GENERALIZED CONVULSIVE EPILEPSY (HCC): Chronic | ICD-10-CM

## 2020-07-01 LAB
ALBUMIN SERPL BCP-MCNC: 3.7 G/DL (ref 3.5–5)
ALP SERPL-CCNC: 48 U/L (ref 46–116)
ALT SERPL W P-5'-P-CCNC: 21 U/L (ref 12–78)
ANION GAP SERPL CALCULATED.3IONS-SCNC: 7 MMOL/L (ref 4–13)
AST SERPL W P-5'-P-CCNC: 13 U/L (ref 5–45)
BILIRUB SERPL-MCNC: 0.23 MG/DL (ref 0.2–1)
BUN SERPL-MCNC: 23 MG/DL (ref 5–25)
CALCIUM SERPL-MCNC: 9.8 MG/DL (ref 8.3–10.1)
CHLORIDE SERPL-SCNC: 106 MMOL/L (ref 100–108)
CO2 SERPL-SCNC: 27 MMOL/L (ref 21–32)
CREAT SERPL-MCNC: 0.84 MG/DL (ref 0.6–1.3)
ERYTHROCYTE [DISTWIDTH] IN BLOOD BY AUTOMATED COUNT: 13.8 % (ref 11.6–15.1)
GFR SERPL CREATININE-BSD FRML MDRD: 100 ML/MIN/1.73SQ M
GLUCOSE P FAST SERPL-MCNC: 99 MG/DL (ref 65–99)
HCT VFR BLD AUTO: 40.1 % (ref 36.5–49.3)
HGB BLD-MCNC: 13.4 G/DL (ref 12–17)
MCH RBC QN AUTO: 31 PG (ref 26.8–34.3)
MCHC RBC AUTO-ENTMCNC: 33.4 G/DL (ref 31.4–37.4)
MCV RBC AUTO: 93 FL (ref 82–98)
PLATELET # BLD AUTO: 169 THOUSANDS/UL (ref 149–390)
PMV BLD AUTO: 12.5 FL (ref 8.9–12.7)
POTASSIUM SERPL-SCNC: 4.2 MMOL/L (ref 3.5–5.3)
PROT SERPL-MCNC: 7.5 G/DL (ref 6.4–8.2)
RBC # BLD AUTO: 4.32 MILLION/UL (ref 3.88–5.62)
SODIUM SERPL-SCNC: 140 MMOL/L (ref 136–145)
VALPROATE SERPL-MCNC: 83 UG/ML (ref 50–100)
WBC # BLD AUTO: 6.53 THOUSAND/UL (ref 4.31–10.16)

## 2020-07-01 PROCEDURE — 80053 COMPREHEN METABOLIC PANEL: CPT

## 2020-07-01 PROCEDURE — 85027 COMPLETE CBC AUTOMATED: CPT

## 2020-07-01 PROCEDURE — 80177 DRUG SCRN QUAN LEVETIRACETAM: CPT

## 2020-07-01 PROCEDURE — 36415 COLL VENOUS BLD VENIPUNCTURE: CPT

## 2020-07-01 PROCEDURE — 80235 DRUG ASSAY LACOSAMIDE: CPT

## 2020-07-01 PROCEDURE — 80164 ASSAY DIPROPYLACETIC ACD TOT: CPT

## 2020-07-05 ENCOUNTER — APPOINTMENT (EMERGENCY)
Dept: RADIOLOGY | Facility: HOSPITAL | Age: 55
End: 2020-07-05
Payer: MEDICARE

## 2020-07-05 ENCOUNTER — HOSPITAL ENCOUNTER (EMERGENCY)
Facility: HOSPITAL | Age: 55
Discharge: HOME | End: 2020-07-05
Attending: EMERGENCY MEDICINE | Admitting: EMERGENCY MEDICINE
Payer: MEDICARE

## 2020-07-05 VITALS
HEIGHT: 72 IN | HEART RATE: 88 BPM | RESPIRATION RATE: 16 BRPM | TEMPERATURE: 97.1 F | BODY MASS INDEX: 24.52 KG/M2 | WEIGHT: 181 LBS | OXYGEN SATURATION: 98 %

## 2020-07-05 DIAGNOSIS — M25.572 LEFT ANKLE PAIN: Primary | ICD-10-CM

## 2020-07-05 LAB — LEVETIRACETAM SERPL-MCNC: 21.1 UG/ML (ref 10–40)

## 2020-07-05 PROCEDURE — 73610 X-RAY EXAM OF ANKLE: CPT

## 2020-07-05 PROCEDURE — 99283 EMERGENCY DEPT VISIT LOW MDM: CPT

## 2020-07-05 PROCEDURE — 99282 EMERGENCY DEPT VISIT SF MDM: CPT | Performed by: EMERGENCY MEDICINE

## 2020-07-05 RX ORDER — TEMAZEPAM 7.5 MG/1
15 CAPSULE ORAL
COMMUNITY
End: 2021-11-03 | Stop reason: ALTCHOICE

## 2020-07-05 NOTE — ED PROVIDER NOTES
History  Chief Complaint   Patient presents with    Ankle Pain     started to have pain in the left ankle starting in the start of the week  Nonverbal patient who expressed to his care that he has had some pain to the left ankle over the past week  No trauma noted no evidence of trauma noted  No other complaints  Prior to Admission Medications   Prescriptions Last Dose Informant Patient Reported? Taking? DEPAKOTE  MG 24 hr tablet   No No   Si tab hs for 3 days, then 1 tab bid for 1 week, then 1 tab in am and 2 tabs hs     Brand necessary   QUEtiapine (SEROquel XR) 200 mg 24 hr tablet   No No   Sig: TAKE 1 TABLET BY MOUTH TWICE A DAY (8AM,2PM)   QUEtiapine (SEROquel XR) 400 mg 24 hr tablet   No No   Sig: TAKE 1 TABLET BY MOUTH AT BEDTIME (8PM) (DX: ANTIPSYCHOTIC)   VIMPAT 150 MG tablet   No No   Sig: TAKE 1 TABLET BY MOUTH EVERY 12 HOURS (DX:EPILEPSY)   benztropine (COGENTIN) 0 5 mg tablet   No No   Sig: TAKE 1 TABLET BY MOUTH TWICE A DAY (8AM,8PM)   bisacodyl (DULCOLAX) 10 mg suppository   No No   Sig: Insert 1 suppository (10 mg total) into the rectum daily as needed for constipation   calcium carbonate (OYSTER SHELL,OSCAL) 500 mg   No No   Sig: TAKE 1 TABLET BY MOUTH 3 TIMES A DAY (8AM,2PM,8PM)   cholecalciferol (VITAMIN D3) 25 mcg (1,000 units) tablet   No No   Sig: TAKE 1 TABLET BY MOUTH DAILY (8AM) (DX: VITAMIN DAILY DEFICIENCY)   docusate sodium (COLACE) 100 mg capsule   No No   Sig: Take 1 capsule (100 mg total) by mouth 2 (two) times a day 8 AM and 8 PM   hydrOXYzine pamoate (VISTARIL) 25 mg capsule   No No   Sig: Take 1 capsule (25 mg total) by mouth 3 (three) times a day as needed for itching   hydrOXYzine pamoate (VISTARIL) 50 mg capsule   No No   Sig: TAKE 1 CAPSULE BY MOUTH 3 TIMES A DAY   ketoconazole (NIZORAL) 2 % shampoo   No No   Sig: Wash hair and sideburns at least 3 times per week   levETIRAcetam (KEPPRA) 750 mg tablet   No No   Sig: Take 1125 mg (1 5 tabs) twice a day    levothyroxine 125 mcg tablet   No No   Sig: Take 1 tablet (125 mcg total) by mouth daily Take at 8 AM   lisinopril (ZESTRIL) 10 mg tablet   No No   Sig: Take 1 tablet (10 mg total) by mouth daily Take at 8 AM   loratadine (ALLERGY) 10 mg tablet   No No   Sig: Take 1 tablet (10 mg total) by mouth daily   Patient taking differently: Take 10 mg by mouth as needed    metFORMIN (GLUCOPHAGE) 1000 MG tablet   No No   Sig: TAKE 1 TABLET BY MOUTH TWICE A DAY WITH MEALS (8AM, 8PM) (DX: DIABETES MELLITUS)   mirtazapine (REMERON) 15 mg tablet   Yes No   Sig: Take 15 mg by mouth daily at bedtime   mirtazapine (REMERON) 7 5 MG tablet   Yes No   Sig: Take 7 5 mg by mouth daily at bedtime   multivitamin (THERAGRAN) TABS   No No   Sig: Take 1 tablet by mouth daily   polyethylene glycol (MIRALAX) 17 g packet   No No   Sig: Take 17 g by mouth daily   simvastatin (ZOCOR) 40 mg tablet   No No   Sig: TAKE 1 TABLET BY MOUTH DAILY (DX: HYPERLIPIDEMIA)   temazepam (RESTORIL) 7 5 mg capsule   Yes Yes   Sig: Take 15 mg by mouth daily at bedtime as needed for sleep   vitamin B-12 (VITAMIN B-12) 1,000 mcg tablet   No No   Sig: TAKE 1 TABLET BY MOUTH EVERY OTHER DAY (8AM)      Facility-Administered Medications: None       Past Medical History:   Diagnosis Date    ADRIANA [facility] injury) ([facility] ) 9/24/2019    CP (cerebral palsy) (HCC)     Depression     Diabetes (HCC)     Disease of thyroid gland     Gait disorder     Hyperlipidemia     Hypertension     Kidney failure     Kidney stones     Osteoporosis     Psychiatric disorder     Scoliosis     Seizures ([facility] )     Thyroid disease     UTI (urinary tract infection)        Past Surgical History:   Procedure Laterality Date    APPENDECTOMY         History reviewed  No pertinent family history  I have reviewed and agree with the history as documented      E-Cigarette/Vaping     E-Cigarette/Vaping Substances     Social History     Tobacco Use    Smoking status: Never Smoker    Smokeless tobacco: Never Used   Substance Use Topics    Alcohol use: Never     Frequency: Never    Drug use: No       Review of Systems   Unable to perform ROS: Patient nonverbal       Physical Exam  Physical Exam   Constitutional: He is oriented to person, place, and time  He appears well-developed and well-nourished  HENT:   Head: Normocephalic and atraumatic  Eyes: Conjunctivae and EOM are normal    Musculoskeletal: He exhibits no edema, tenderness or deformity  Left leg:  No focal tenderness to the ankle or the foot  No deformity no instability  Neurovascular intact  Neurological: He is alert and oriented to person, place, and time  Skin: Skin is warm and dry  Psychiatric: He has a normal mood and affect         Vital Signs  ED Triage Vitals [07/05/20 0902]   Temperature Pulse Respirations BP SpO2   (!) 97 1 °F (36 2 °C) 88 16 -- 98 %      Temp Source Heart Rate Source Patient Position - Orthostatic VS BP Location FiO2 (%)   Temporal Monitor -- -- --      Pain Score       --           Vitals:    07/05/20 0902   Pulse: 88         Visual Acuity      ED Medications  Medications - No data to display    Diagnostic Studies  Results Reviewed     None                 XR ankle 3+ views LEFT   ED Interpretation by Dex Uribe MD (07/05 Joe Michelle)   Old ORIF/Fx no acute fx/dl                 Procedures  Procedures         ED Course                                             MDM  Number of Diagnoses or Management Options  Diagnosis management comments: No definite physical findings to suggest injury however given nonverbal patient will get an x-ray and if negative discharge home with recommendation to follow-up with PMD for persistence symptoms        Disposition  Final diagnoses:   Left ankle pain     Time reflects when diagnosis was documented in both MDM as applicable and the Disposition within this note     Time User Action Codes Description Comment    7/5/2020 10:45 AM Nicole Mccollum Add [Z40 012] Left ankle pain       ED Disposition     ED Disposition Condition Date/Time Comment    Discharge Stable Sun Jul 5, 2020 10:45 AM Garrick Oconnell discharge to home/self care  Follow-up Information     Follow up With Specialties Details Why Contact Info    Primary Care Provider - see in next 48 hours  Go in 2 days Return to ER if symptoms worsen or new symptoms develop           Patient's Medications   Discharge Prescriptions    No medications on file     No discharge procedures on file      PDMP Review       Value Time User    PDMP Reviewed  Yes 10/25/2019  3:28 PM Celine Sheridan MD          ED Provider  Electronically Signed by           Randy Cornelius MD  07/05/20 1045

## 2020-07-07 LAB — LACOSAMIDE SERPL-MCNC: 5.2 UG/ML (ref 5–10)

## 2020-07-09 ENCOUNTER — TELEPHONE (OUTPATIENT)
Dept: NEUROLOGY | Facility: CLINIC | Age: 55
End: 2020-07-09

## 2020-07-10 ENCOUNTER — TELEMEDICINE (OUTPATIENT)
Dept: NEUROLOGY | Facility: CLINIC | Age: 55
End: 2020-07-10
Payer: MEDICARE

## 2020-07-10 ENCOUNTER — TELEPHONE (OUTPATIENT)
Dept: NEUROLOGY | Facility: CLINIC | Age: 55
End: 2020-07-10

## 2020-07-10 VITALS — HEIGHT: 72 IN | BODY MASS INDEX: 24.55 KG/M2

## 2020-07-10 DIAGNOSIS — E55.9 VITAMIN D DEFICIENCY: ICD-10-CM

## 2020-07-10 DIAGNOSIS — G40.309 GENERALIZED CONVULSIVE EPILEPSY (HCC): Primary | Chronic | ICD-10-CM

## 2020-07-10 DIAGNOSIS — G80.9 CEREBRAL PALSY, UNSPECIFIED TYPE (HCC): ICD-10-CM

## 2020-07-10 DIAGNOSIS — D69.1 THROMBOCYTOPATHIA (HCC): ICD-10-CM

## 2020-07-10 PROCEDURE — 99442 PR PHYS/QHP TELEPHONE EVALUATION 11-20 MIN: CPT | Performed by: NURSE PRACTITIONER

## 2020-07-10 RX ORDER — GABAPENTIN 100 MG/1
100 CAPSULE ORAL 3 TIMES DAILY
COMMUNITY
Start: 2020-06-26

## 2020-07-10 NOTE — PATIENT INSTRUCTIONS
1  Please continue current seizure medications:  - Depakote ER 500mg tablets (BRAND NECESSARY)- 1 tablet in the morning and 2 tablets at night  - Levetiracetam 750mg tablets- 1 5 tablets twice per day  - Lacosamide 150mg tablets- 1 tablet twice per day  2  Please call the office with worsening arm jerking   3  Please call the office with any seizures  4   Follow up with Dr Dain Lin in 3 months or sooner if needed - have blood work done a week prior to appointment

## 2020-07-10 NOTE — TELEPHONE ENCOUNTER
NICOLAS confirmed [id number] visit he and Oseas Crain will come with Tiffanie William aware to arrive 2pm call disconnect I lmom need to complete reg

## 2020-07-10 NOTE — ASSESSMENT & PLAN NOTE
History of vitamin D deficiency, on 1000 units D3 daily  Depakote may also lower vitamin D levels  Will check vitamin D level with bloodwork for his next appointment

## 2020-07-10 NOTE — ASSESSMENT & PLAN NOTE
Patient with thrombocytopathia in the past likely related to depakote use  Most recent platelet count on 70/65/2798 was 169

## 2020-07-10 NOTE — PROGRESS NOTES
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)     Patient has generally been doing well since his last visit with Dr Romario Hubbard  He did move several weeks ago and was very anxious  During that time he did have some jerky muscle movements but this has resolved after adjusting to his new home  Tolerating current medications well without any side effects  Will continue current medications but if jerking returns or becomes more frequent the patient's caregivers are agreeable to reaching out to the office as he may require a medication increase at that time  He will follow up with dr Romario Hubbard in 3 months or sooner if needed  Blood work to be done one week prior to next appointment for AED levels, CBC +plt, and CMP  Relevant Orders    Valproic acid level, total    Lacosamide    CBC and Platelet    Comprehensive metabolic panel    Levetiracetam level    Cerebral palsy (HCC)     No changes from baseline per caregivers during telephone visit  Hematopoietic and Hemostatic    Thrombocytopathia ([facility] )     Patient with thrombocytopathia in the past likely related to depakote use  Most recent platelet count on 91/72/7062 was 169  Relevant Orders    CBC and Platelet       Other    Vitamin D deficiency     History of vitamin D deficiency, on 1000 units D3 daily  Depakote may also lower vitamin D levels  Will check vitamin D level with bloodwork for his next appointment            Relevant Orders    Vitamin D 25 hydroxy                Reason for visit is   Chief Complaint   Patient presents with    Follow-up    Virtual Brief Visit        Encounter provider Ferdinand Aguilera Casia     Provider located at [address] [facility] [address]    Recent Visits  Date Type Provider Dept   07/09/20 Telephone Isamar Morales [facility]   Showing recent visits within past 7 days and meeting all other requirements     Today's Visits  Date Type Provider Dept   07/10/20 Telephone Christian Blackwell, [place] [facility] [place]   07/10/20 Telemedicine Ailynok Wooten [facility] [place] [facility] [place]   Showing today's visits and meeting all other requirements     Future Appointments  Date Type Provider Dept   [facility] [place]   Showing future appointments within next 150 days and meeting all other requirements        After connecting through [employer] and patient was informed that this is a secure, HIPAA-compliant platform  He agrees to proceed  , the patient was identified by name and date of birth  Loco Valerio was informed that this is a telemedicine visit and that the visit is being conducted through [employer] and patient was informed that this is a secure, HIPAA-compliant platform  He agrees to proceed     My office door was closed  No one else was in the room  He acknowledged consent and understanding of privacy and security of the platform  The patient has agreed to participate and understands he can discontinue the visit at any time  Patient is aware this is a billable service  Subjective    Loco Valerio is a 50 y o  male with a history of generalized convulsive epilepsy, DM2, HTN, hypothyrodism, cerebral palsy, osteoporosis, scoliosis, thrombocytopathia, cataracts, hyperlipidemia, vitamin D deficiency, and headaches who was last seen in the office on 03/30/2020 by Dr Loan Macedo  At that time, his seizures an myoclonus had significantly improved since restarting his depakote  Since his last visit, he has been doing well  Denies seizures since his last visit with Dr Loan Macedo  He has been having jerky arm movements starting 3 weeks ago when he moved into a new house and was very anxious  The past week or so it has calmed down now that he is adjusting to his new home   Everything else has been pretty smooth  No recent changes in [place]  He does ambulate  He does have a "lifesharing provider "     He does have some mouth pain but has a dentist appointment next week  No other complaints or concerns from patient or caregivers  Current seizure medications:  - Depakote ER 500mg tablets (BRAND NECESSARY)- 1 tablet in the morning and 2 tablets at night  - Levetiracetam 750mg tablets- 1 5 tablets twice per day  - Lacosamide 150mg tablets- 1 tablet twice per day  Other medications as per [facility]  Recent Labs:  Results for Karla Canchola (MRN [id number]) as of 7/10/2020 14:03   Ref   Range 7/1/2020 09:29   Sodium Latest Ref Range: 136 - 145 mmol/L 140   Potassium Latest Ref Range: 3 5 - 5 3 mmol/L 4 2   Chloride Latest Ref Range: 100 - 108 mmol/L 106   CO2 Latest Ref Range: 21 - 32 mmol/L 27   Anion Gap Latest Ref Range: 4 - 13 mmol/L 7   BUN Latest Ref Range: 5 - 25 mg/dL 23   Creatinine Latest Ref Range: 0 60 - 1 30 mg/dL 0 84   GLUCOSE FASTING Latest Ref Range: 65 - 99 mg/dL 99   Calcium Latest Ref Range: 8 3 - 10 1 mg/dL 9 8   AST Latest Ref Range: 5 - 45 U/L 13   ALT Latest Ref Range: 12 - 78 U/L 21   Alkaline Phosphatase Latest Ref Range: 46 - 116 U/L 48   Total Protein Latest Ref Range: 6 4 - 8 2 g/dL 7 5   Albumin Latest Ref Range: 3 5 - 5 0 g/dL 3 7   TOTAL BILIRUBIN Latest Ref Range: 0 20 - 1 00 mg/dL 0 23   eGFR Latest Units: ml/min/1 73sq m 100   WBC Latest Ref Range: 4 31 - 10 16 Thousand/uL 6 53   Red Blood Cell Count Latest Ref Range: 3 88 - 5 62 Million/uL 4 32   Hemoglobin Latest Ref Range: 12 0 - 17 0 g/dL 13 4   HCT Latest Ref Range: 36 5 - 49 3 % 40 1   MCV Latest Ref Range: 82 - 98 fL 93   MCH Latest Ref Range: 26 8 - 34 3 pg 31 0   MCHC Latest Ref Range: 31 4 - 37 4 g/dL 33 4   RDW Latest Ref Range: 11 6 - 15 1 % 13 8   Platelet Count Latest Ref Range: 149 - 390 Thousands/uL 169   MPV Latest Ref Range: 8 9 - 12 7 fL 12 5   LEVETIRACETA (KEPPRA) Latest Ref Range: 10 0 - 40 0 ug/mL 21 1   Lacosamide Latest Ref Range: 5 0 - 10 0 ug/mL 5 2   VALPROIC ACID LEVEL, TOTAL Unknown Rpt   VALPROIC ACID TOTAL Latest Ref Range: 50 - 100 ug/mL 83       Prior Medications: zonisamide       I reviewed prior neurology documentation, [facility] and summarized above  History was also obtained from patient's caregivers who were present for telephone visit  The following portions of the patient's history were reviewed and updated as appropriate: allergies, current medications, past family history, past medical history, past social history, past surgical history and problem list          Past Medical History:   Diagnosis Date    ADRIANA [facility]acute kidney injury) ([facility] ) 9/24/2019    CP (cerebral palsy) ([place] [address] )     Depression     Diabetes ([place] [address] )     Disease of thyroid gland     Gait disorder     Hyperlipidemia     Hypertension     Kidney failure     Kidney stones     Osteoporosis     Psychiatric disorder     Scoliosis     Seizures ([facility] )     Thyroid disease     UTI (urinary tract infection)        Past Surgical History:   Procedure Laterality Date    APPENDECTOMY         Current Outpatient Medications   Medication Sig Dispense Refill    benztropine (COGENTIN) 0 5 mg tablet TAKE 1 TABLET BY MOUTH TWICE A DAY (8AM,8PM) 56 tablet 10    calcium carbonate (OYSTER SHELL,OSCAL) 500 mg TAKE 1 TABLET BY MOUTH 3 TIMES A DAY (8AM,2PM,8PM) 84 each 10    cholecalciferol (VITAMIN D3) 25 mcg (1,000 units) tablet TAKE 1 TABLET BY MOUTH DAILY (8AM) (DX: VITAMIN DAILY DEFICIENCY) 28 tablet 4    DEPAKOTE  MG 24 hr tablet 1 tab hs for 3 days, then 1 tab bid for 1 week, then 1 tab in am and 2 tabs hs     Brand necessary 90 tablet 11    docusate sodium (COLACE) 100 mg capsule Take 1 capsule (100 mg total) by mouth 2 (two) times a day 8 AM and 8 PM 60 capsule 2    gabapentin (NEURONTIN) 100 mg capsule       hydrOXYzine pamoate (VISTARIL) 50 mg capsule TAKE 1 CAPSULE BY MOUTH 3 TIMES A DAY 84 capsule 10    ketoconazole (NIZORAL) 2 % shampoo Wash hair and sideburns at least 3 times per week 120 mL 3    levETIRAcetam (KEPPRA) 750 mg tablet Take 1125 mg (1 5 tabs) twice a day  90 tablet 11    levothyroxine 125 mcg tablet Take 1 tablet (125 mcg total) by mouth daily Take at 8 AM 12 tablet 10    lisinopril (ZESTRIL) 10 mg tablet Take 1 tablet (10 mg total) by mouth daily Take at 8 AM 30 tablet 2    loratadine (ALLERGY) 10 mg tablet Take 1 tablet (10 mg total) by mouth daily (Patient taking differently: Take 10 mg by mouth as needed ) 90 tablet 0    metFORMIN (GLUCOPHAGE) 1000 MG tablet TAKE 1 TABLET BY MOUTH TWICE A DAY WITH MEALS (8AM, 8PM) (DX: DIABETES MELLITUS) 56 tablet 4    multivitamin (THERAGRAN) TABS Take 1 tablet by mouth daily 90 tablet 0    polyethylene glycol (MIRALAX) 17 g packet Take 17 g by mouth daily 14 each 8    QUEtiapine (SEROquel XR) 200 mg 24 hr tablet TAKE 1 TABLET BY MOUTH TWICE A DAY (8AM,2PM) 16 tablet 10    QUEtiapine (SEROquel XR) 400 mg 24 hr tablet TAKE 1 TABLET BY MOUTH AT BEDTIME (8PM) (DX: ANTIPSYCHOTIC) 28 tablet 10    simvastatin (ZOCOR) 40 mg tablet TAKE 1 TABLET BY MOUTH DAILY (DX: HYPERLIPIDEMIA) 28 tablet 4    temazepam (RESTORIL) 7 5 mg capsule Take 15 mg by mouth daily at bedtime as needed for sleep      VIMPAT 150 MG tablet TAKE 1 TABLET BY MOUTH EVERY 12 HOURS (DX:EPILEPSY) 56 tablet 4    vitamin B-12 (VITAMIN B-12) 1,000 mcg tablet TAKE 1 TABLET BY MOUTH EVERY OTHER DAY (8AM) 14 tablet 10    bisacodyl (DULCOLAX) 10 mg suppository Insert 1 suppository (10 mg total) into the rectum daily as needed for constipation (Patient not taking: Reported on 7/10/2020)      mirtazapine (REMERON) 15 mg tablet Take 15 mg by mouth daily at bedtime      mirtazapine (REMERON) 7 5 MG tablet Take 7 5 mg by mouth daily at bedtime       No current facility-administered medications for this visit           Allergies   Allergen Reactions    Erythromycin     Penicillins        Review of Systems obtained by MA and reviewed by myself:  Constitutional: Negative  Negative for appetite change and fever  HENT: Negative  Negative for hearing loss, tinnitus, trouble swallowing and voice change  Eyes: Negative  Negative for photophobia and pain  Respiratory: Negative  Negative for shortness of breath  Cardiovascular: Negative  Negative for palpitations  Gastrointestinal: Negative  Negative for nausea and vomiting  Endocrine: Negative  Negative for cold intolerance  Genitourinary: Negative  Negative for dysuria, frequency and urgency  Musculoskeletal: Negative  Negative for myalgias and neck pain  Skin: Negative  Negative for rash  Allergic/Immunologic: Negative  Neurological: Negative  Negative for dizziness, tremors, seizures, syncope, facial asymmetry, speech difficulty, weakness, light-headedness, numbness and headaches  Hematological: Negative  Does not bruise/bleed easily  Psychiatric/Behavioral: Negative  Negative for confusion, hallucinations and sleep disturbance  Vitals:    07/10/20 1345   Height: 6' (1 829 m)     Unable to perform physical or neurological exam as this was a telephone visit  Caregivers deny any changes from baseline  I spent 20 minutes directly with the patient during this visit    Chinedu Alok acknowledges that he has consented to an online visit or consultation  He understands that the online visit is based solely on information provided by him, and that, in the absence of a face-to-face physical evaluation by the physician, the diagnosis he receives is both limited and provisional in terms of accuracy and completeness  This is not intended to replace a full medical face-to-face evaluation by the physician  Denis Favre understands and accepts these terms

## 2020-07-10 NOTE — ASSESSMENT & PLAN NOTE
Patient has generally been doing well since his last visit with Dr Liliana Jones  He did move several weeks ago and was very anxious  During that time he did have some jerky muscle movements but this has resolved after adjusting to his new home  Tolerating current medications well without any side effects  Will continue current medications but if jerking returns or becomes more frequent the patient's caregivers are agreeable to reaching out to the office as he may require a medication increase at that time  He will follow up with dr Liliana Jones in 3 months or sooner if needed  Blood work to be done one week prior to next appointment for AED levels, CBC +plt, and CMP

## 2020-07-10 NOTE — TELEPHONE ENCOUNTER
MATERNAL FETAL MEDICINE IS FOLLOWING THE PATIENT  FOR   1. Cervical cerclage suture present (MFM)    2. History of premature rupture of membranes in previous pregnancy, (MFM)    3. 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       Current Facility-Administered Medications   Medication Dose Route Frequency Provider Last Rate Last Dose   • hydroxyPROGESTERONE caproate (TIMBO) IM injection 250 mg  250 mg Intramuscular Once every 7 days Jose Carlos Moreira MD   250 mg at 12/08/17 1600       Allergies as of 04/16/2018   • (No Known Allergies)         PHYSICAL EXAMINATION:  VITAL SIGNS:    Visit Vitals  /84   Ht 5' 1\" (1.549 m)   Wt 70.7 kg   LMP 07/27/2017   BMI 29.44 kg/m²       LABORATORY RESULTS SINCE LAST VISIT:  OB Check on 04/09/2018   Component Date Value   • Specimen Description 04/09/2018 VAGINA/RECTUM    • CULTURE 04/09/2018 NEGATIVE FOR STREPTOCOCCUS AGALACTIAE (STREP GROUP B)    • REPORT STATUS 04/09/2018 04/12/2018 FINAL    Lab Services on 03/19/2018   Component Date Value   • Toxoplasma IgG AB 03/19/2018 <0.50    • TOXOPLASMA IGM 03/19/2018 0.42    • CYTOMEGALOVIRUS IGG AB 03/19/2018 3.23*   • CYTOMEGALOVIRUS IGM 03/19/2018 0.16    • Cytomegalovirus IgG Avid* 03/19/2018 0.66        TODAY'S ULTRASOUND SHOWED:   biophysical profile 8 out of 8     ASSESSMENT AND PLAN: A 37w4d pregnancy being followed by [facility] for   PROBLEM 1.  1. Cervical cerclage suture present (MFM)    2. History of premature rupture of membranes in previous pregnancy, (MFM)    3. Ambiguous genitalia on (fetal ([facility]))      PLAN;  1. Continue weekly assessment  2. Continue to follow-up with Vidal Vincent MD for routine obstetrical care    Total time of today's office visit 10 minutes,  Rogelio Ruff called back Tiffanie William may not be able to keep mask on Rogelio Ruff will call back possible video or telephone visit greater than 50% spent face to face discussion on  A. Reviewing with the patient the care plan noted above

## 2020-07-10 NOTE — TELEPHONE ENCOUNTER
MISTIOM for pt to call back to go over med hx and ROS prior to his scheduled phone visit for today with Modesto Flores at 2:15pm

## 2020-07-13 ENCOUNTER — OFFICE VISIT (OUTPATIENT)
Dept: FAMILY MEDICINE CLINIC | Facility: HOME HEALTHCARE | Age: 55
End: 2020-07-13
Payer: MEDICARE

## 2020-07-13 VITALS
HEART RATE: 67 BPM | BODY MASS INDEX: 23.7 KG/M2 | DIASTOLIC BLOOD PRESSURE: 78 MMHG | SYSTOLIC BLOOD PRESSURE: 120 MMHG | RESPIRATION RATE: 18 BRPM | WEIGHT: 175 LBS | TEMPERATURE: 97.7 F | HEIGHT: 72 IN

## 2020-07-13 DIAGNOSIS — Z11.4 SCREENING FOR HIV WITHOUT PRESENCE OF RISK FACTORS: ICD-10-CM

## 2020-07-13 DIAGNOSIS — E08.00 DIABETES MELLITUS DUE TO UNDERLYING CONDITION WITH HYPEROSMOLARITY WITHOUT COMA, WITHOUT LONG-TERM CURRENT USE OF INSULIN (HCC): ICD-10-CM

## 2020-07-13 DIAGNOSIS — Z13.29 SCREENING FOR ENDOCRINE, NUTRITIONAL, METABOLIC AND IMMUNITY DISORDER: ICD-10-CM

## 2020-07-13 DIAGNOSIS — Z13.21 SCREENING FOR ENDOCRINE, NUTRITIONAL, METABOLIC AND IMMUNITY DISORDER: ICD-10-CM

## 2020-07-13 DIAGNOSIS — E11.9 DIABETES MELLITUS WITHOUT COMPLICATION (HCC): Primary | ICD-10-CM

## 2020-07-13 DIAGNOSIS — Z13.228 SCREENING FOR ENDOCRINE, NUTRITIONAL, METABOLIC AND IMMUNITY DISORDER: ICD-10-CM

## 2020-07-13 DIAGNOSIS — E11.9 TYPE 2 DIABETES MELLITUS WITHOUT COMPLICATION, WITHOUT LONG-TERM CURRENT USE OF INSULIN (HCC): Primary | Chronic | ICD-10-CM
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
## 2020-07-13 DIAGNOSIS — Z13.0 SCREENING FOR ENDOCRINE, NUTRITIONAL, METABOLIC AND IMMUNITY DISORDER: ICD-10-CM

## 2020-07-13 LAB
SL AMB POCT GLUCOSE BLD: 97
SL AMB POCT HEMOGLOBIN AIC: 7.1 (ref ?–6.5)

## 2020-07-13 PROCEDURE — 99213 OFFICE O/P EST LOW 20 MIN: CPT | Performed by: FAMILY MEDICINE

## 2020-07-13 PROCEDURE — 82948 REAGENT STRIP/BLOOD GLUCOSE: CPT | Performed by: NURSE PRACTITIONER

## 2020-07-13 PROCEDURE — 83036 HEMOGLOBIN GLYCOSYLATED A1C: CPT | Performed by: FAMILY MEDICINE

## 2020-07-13 RX ORDER — TRIAMCINOLONE ACETONIDE 1 MG/G
CREAM TOPICAL 2 TIMES DAILY
Qty: 30 G | Refills: 0 | Status: SHIPPED | OUTPATIENT
Start: 2020-07-13 | End: 2021-12-23 | Stop reason: HOSPADM

## 2020-07-13 RX ORDER — LANCETS 33 GAUGE
EACH MISCELLANEOUS DAILY
Qty: 100 EACH | Refills: 2 | Status: SHIPPED | OUTPATIENT
Start: 2020-07-13 | End: 2020-07-31 | Stop reason: SDUPTHER

## 2020-07-13 RX ORDER — PEN NEEDLE, DIABETIC 30 GX3/16"
NEEDLE, DISPOSABLE MISCELLANEOUS DAILY
Qty: 100 EACH | Refills: 5 | Status: SHIPPED | OUTPATIENT
Start: 2020-07-13

## 2020-07-13 NOTE — PROGRESS NOTES
[facility]       NAME: Ekta Noe is a 50 y o  male  : 1965    MRN: [id number]  DATE: 2020  TIME: 2:30 PM    Assessment and Plan   Diagnoses and all orders for this visit:    Type 2 diabetes mellitus without complication, without long-term current use of insulin (HCC)  -     POCT hemoglobin A1c  -     Glucometer  -     liraglutide (VICTOZA) injection; Inject 0 1 mL (0 6 mg total) under the skin daily for 7 days, THEN 0 2 mL (1 2 mg total) daily   -     POCT blood glucose    Screening for HIV without presence of risk factors  -     HIV 1/2 Antigen/Antibody (4th Generation) w Reflex [facility]; Future    Insect bite, unspecified site, initial encounter  -     triamcinolone (KENALOG) 0 1 % cream; Apply topically 2 (two) times a day    Diabetes mellitus due to underlying condition with hyperosmolarity without coma, without long-term current use of insulin (HCC)  -     glucose blood (OneTouch Ultra) test strip; 1 each by Other route daily Use as instructed  -     OneTouch Delica Lancets 01T MISC; by Does not apply route daily    Screening for endocrine, nutritional, metabolic and immunity disorder  -     CBC and differential; Future  -     Comprehensive metabolic panel; Future  -     Lipid Panel with Direct LDL reflex; Future  -     Vitamin D 25 hydroxy; Future        No problem-specific Assessment & Plan notes found for this encounter  Patient Instructions           Chief Complaint     Chief Complaint   Patient presents with    Diabetes     no acute complaints per pt         History of Present Illness         Roselia Gandara presents today with his caregiver for routine follow-up  He is very limited verbally/nonverbal at baseline He has a known history of seizure disorder, is followed by Neurology  Caregivers deny any recent seizure activity but he has had jerking movements    Review of neurology note days ago reveals that caregivers are to call if jerking movements increase or worsen and they will adjust medications  Caregivers are aware of this  A1c in office today 7 1, greatly increased from previous 5 5 last year  Is taking metformin currently  Is agreeable to adding Victoza, with follow-up in 1 month  Reviewed lifestyle modifications and carbohydrate monitoring, monitor blood glucose daily  Watch for hypoglycemia, reviewed signs of hypoglycemia in with the do in the event that it occurs  No other concerns today                  Review of Systems   Review of Systems   Constitutional: Negative for chills, fatigue, fever and unexpected weight change  HENT: Negative for postnasal drip, sinus pressure and sore throat  Eyes: Negative for discharge  Respiratory: Negative for chest tightness, shortness of breath and wheezing  Cardiovascular: Negative for chest pain  Gastrointestinal: Negative for constipation, diarrhea and vomiting  Musculoskeletal: Negative for arthralgias  Skin: Negative for rash  Insect bites to b/l knees   Neurological: Negative for dizziness and syncope  Current Medications       Current Outpatient Medications:     benztropine (COGENTIN) 0 5 mg tablet, TAKE 1 TABLET BY MOUTH TWICE A DAY (8AM,8PM), Disp: 56 tablet, Rfl: 10    calcium carbonate (OYSTER SHELL,OSCAL) 500 mg, TAKE 1 TABLET BY MOUTH 3 TIMES A DAY (8AM,2PM,8PM), Disp: 84 each, Rfl: 10    cholecalciferol (VITAMIN D3) 25 mcg (1,000 units) tablet, TAKE 1 TABLET BY MOUTH DAILY (8AM) (DX: VITAMIN DAILY DEFICIENCY), Disp: 28 tablet, Rfl: 4    DEPAKOTE  MG 24 hr tablet, 1 tab hs for 3 days, then 1 tab bid for 1 week, then 1 tab in am and 2 tabs hs    Brand necessary, Disp: 90 tablet, Rfl: 11    docusate sodium (COLACE) 100 mg capsule, Take 1 capsule (100 mg total) by mouth 2 (two) times a day 8 AM and 8 PM, Disp: 60 capsule, Rfl: 2    gabapentin (NEURONTIN) 100 mg capsule, Take 100 mg by mouth 3 (three) times a day , Disp: , Rfl:     hydrOXYzine pamoate (VISTARIL) 50 mg capsule, TAKE 1 CAPSULE BY MOUTH 3 TIMES A DAY, Disp: 84 capsule, Rfl: 10    ketoconazole (NIZORAL) 2 % shampoo, Wash hair and sideburns at least 3 times per week, Disp: 120 mL, Rfl: 3    levETIRAcetam (KEPPRA) 750 mg tablet, Take 1125 mg (1 5 tabs) twice a day , Disp: 90 tablet, Rfl: 11    levothyroxine 125 mcg tablet, Take 1 tablet (125 mcg total) by mouth daily Take at 8 AM, Disp: 12 tablet, Rfl: 10    lisinopril (ZESTRIL) 10 mg tablet, Take 1 tablet (10 mg total) by mouth daily Take at 8 AM, Disp: 30 tablet, Rfl: 2    loratadine (ALLERGY) 10 mg tablet, Take 1 tablet (10 mg total) by mouth daily (Patient taking differently: Take 10 mg by mouth as needed ), Disp: 90 tablet, Rfl: 0    metFORMIN (GLUCOPHAGE) 1000 MG tablet, TAKE 1 TABLET BY MOUTH TWICE A DAY WITH MEALS (8AM, 8PM) (DX: DIABETES MELLITUS), Disp: 56 tablet, Rfl: 4    multivitamin (THERAGRAN) TABS, Take 1 tablet by mouth daily, Disp: 90 tablet, Rfl: 0    polyethylene glycol (MIRALAX) 17 g packet, Take 17 g by mouth daily, Disp: 14 each, Rfl: 8    QUEtiapine (SEROquel XR) 200 mg 24 hr tablet, TAKE 1 TABLET BY MOUTH TWICE A DAY (8AM,2PM), Disp: 16 tablet, Rfl: 10    QUEtiapine (SEROquel XR) 400 mg 24 hr tablet, TAKE 1 TABLET BY MOUTH AT BEDTIME (8PM) (DX: ANTIPSYCHOTIC), Disp: 28 tablet, Rfl: 10    simvastatin (ZOCOR) 40 mg tablet, TAKE 1 TABLET BY MOUTH DAILY (DX: HYPERLIPIDEMIA), Disp: 28 tablet, Rfl: 4    temazepam (RESTORIL) 7 5 mg capsule, Take 15 mg by mouth daily at bedtime as needed for sleep, Disp: , Rfl:     VIMPAT 150 MG tablet, TAKE 1 TABLET BY MOUTH EVERY 12 HOURS (DX:EPILEPSY), Disp: 56 tablet, Rfl: 4    vitamin B-12 (VITAMIN B-12) 1,000 mcg tablet, TAKE 1 TABLET BY MOUTH EVERY OTHER DAY (8AM), Disp: 14 tablet, Rfl: 10    glucose blood (OneTouch Ultra) test strip, 1 each by Other route daily Use as instructed, Disp: 100 each, Rfl: 2    liraglutide (VICTOZA) injection, Inject 0 1 mL (0 6 mg total) under the skin daily for 7 days, THEN 0 2 mL (1 2 mg total) daily  , Disp: 8 mL, Rfl: 0    OneTouch Delica Lancets 39B MISC, by Does not apply route daily, Disp: 100 each, Rfl: 2    triamcinolone (KENALOG) 0 1 % cream, Apply topically 2 (two) times a day, Disp: 30 g, Rfl: 0    Current Allergies     Allergies as of 07/13/2020 - Reviewed 07/13/2020   Allergen Reaction Noted    Erythromycin Other (See Comments) 11/22/2013    Penicillins Other (See Comments) 11/22/2013            The following portions of the patient's history were reviewed and updated as appropriate: allergies, current medications, past family history, past medical history, past social history, past surgical history and problem list      Past Medical History:   Diagnosis Date    [facility]24/2019    [facility]     Depression     Diabetes ([facility] )     Disease of thyroid gland     Gait disorder     Hyperlipidemia     Hypertension     Kidney failure     Kidney stones     Osteoporosis     Psychiatric disorder     Scoliosis     Seizures ([facility] )     Thyroid disease     UTI (urinary tract infection)        Past Surgical History:   Procedure Laterality Date    APPENDECTOMY         History reviewed  No pertinent family history  Medications have been verified  Objective   /78 (BP Location: Right arm, Patient Position: Sitting, Cuff Size: Adult)   Pulse 67   Temp 97 7 °F (36 5 °C) (Temporal)   Resp 18   Ht 6' (1 829 m)   Wt 79 4 kg (175 lb)   BMI 23 73 kg/m²        Physical Exam     Physical Exam   Constitutional: He appears well-developed and well-nourished  No distress  HENT:   Right Ear: External ear normal    Left Ear: External ear normal    Nose: Nose normal    Mouth/Throat: Oropharynx is clear and moist    Eyes: Pupils are equal, round, and reactive to light  EOM are normal    Neck: Normal range of motion  Neck supple  Cardiovascular: Normal rate, regular rhythm, normal heart sounds and intact distal pulses  Pulmonary/Chest: Effort normal and breath sounds normal    Abdominal: Soft  Musculoskeletal: Normal range of motion  Lymphadenopathy:     He has no cervical adenopathy  Neurological: He is alert  Skin: Skin is warm and dry  Capillary refill takes less than 2 seconds  Faint erythema papular insect bite to bilateral knees without signs of underlying infection  Psychiatric: He has a normal mood and affect  Nursing note and vitals reviewed

## 2020-07-13 NOTE — PATIENT INSTRUCTIONS
Where a medical alert identification to carry  Check your feet each day for sores, make sure your socks and shoes fit correctly  Did not trim year nails  Established with a podiatrist   Maintain a healthy weight, this can help you control her diabetes  Follow a proper meal plan  Keep track of her carbohydrates, eat low-fat foods, low-salt, high-fiber foods, limit alcohol  Exercise can help her blood sugar level study, decrease her risk of heart disease, and help you lose weight  Smoking cessation advised if applicable  Good blood pressure control is recommended  A normal blood pressure is 119/78 or lower  In neural blood pressure can help prevent certain complications from diabetes  Recommend yearly eye exams to assess for retinopathy  Consider vaccination against flu, pneumonia, and hepatitis

## 2020-07-14 ENCOUNTER — TELEPHONE (OUTPATIENT)
Dept: OTHER | Facility: OTHER | Age: 55
End: 2020-07-14

## 2020-07-14 NOTE — TELEPHONE ENCOUNTER
Patient picked everything up at the pharmacy but they were not given test strips  The pharmacy said they never received the order for them  Patient would like them resent to the pharmacy   [address] [facility] # [phone]

## 2020-07-15 DIAGNOSIS — E08.00 DIABETES MELLITUS DUE TO UNDERLYING CONDITION WITH HYPEROSMOLARITY WITHOUT COMA, WITHOUT LONG-TERM CURRENT USE OF INSULIN (HCC): ICD-10-CM

## 2020-07-15 LAB — GLUCOSE SERPL-MCNC: 97 MG/DL (ref 65–140)

## 2020-07-30 DIAGNOSIS — G89.29 OTHER CHRONIC PAIN: ICD-10-CM

## 2020-07-30 DIAGNOSIS — E11.9 DIABETES MELLITUS WITHOUT COMPLICATION (HCC): Primary | ICD-10-CM

## 2020-07-30 DIAGNOSIS — M25.572 ACUTE LEFT ANKLE PAIN: ICD-10-CM

## 2020-07-30 DIAGNOSIS — E08.00 DIABETES MELLITUS DUE TO UNDERLYING CONDITION WITH HYPEROSMOLARITY WITHOUT COMA, WITHOUT LONG-TERM CURRENT USE OF INSULIN (HCC): ICD-10-CM

## 2020-07-31 RX ORDER — LANCETS 33 GAUGE
EACH MISCELLANEOUS 3 TIMES DAILY
Qty: 100 EACH | Refills: 5 | OUTPATIENT
Start: 2020-07-31

## 2020-07-31 RX ORDER — LANCETS 33 GAUGE
EACH MISCELLANEOUS DAILY
Qty: 100 EACH | Refills: 2 | Status: SHIPPED | OUTPATIENT
Start: 2020-07-31

## 2020-07-31 RX ORDER — BLOOD-GLUCOSE METER
EACH MISCELLANEOUS 3 TIMES DAILY
Qty: 1 KIT | Refills: 0 | OUTPATIENT
Start: 2020-07-31

## 2020-07-31 RX ORDER — IBUPROFEN 600 MG/1
600 TABLET ORAL EVERY 8 HOURS SCHEDULED
Qty: 60 TABLET | Refills: 0 | Status: SHIPPED | OUTPATIENT
Start: 2020-07-31 | End: 2021-09-15 | Stop reason: HOSPADM

## 2020-07-31 RX ORDER — BLOOD-GLUCOSE METER
EACH MISCELLANEOUS 3 TIMES DAILY
Qty: 1 KIT | Refills: 0 | Status: SHIPPED | OUTPATIENT
Start: 2020-07-31

## 2020-07-31 RX ORDER — IBUPROFEN 600 MG/1
TABLET ORAL
Qty: 30 TABLET | Refills: 10 | OUTPATIENT
Start: 2020-07-31

## 2020-08-10 ENCOUNTER — OFFICE VISIT (OUTPATIENT)
Dept: FAMILY MEDICINE CLINIC | Facility: HOME HEALTHCARE | Age: 55
End: 2020-08-10
Payer: MEDICARE

## 2020-08-10 VITALS
RESPIRATION RATE: 18 BRPM | BODY MASS INDEX: 23.3 KG/M2 | HEIGHT: 72 IN | SYSTOLIC BLOOD PRESSURE: 128 MMHG | WEIGHT: 172 LBS | HEART RATE: 96 BPM | DIASTOLIC BLOOD PRESSURE: 82 MMHG | TEMPERATURE: 97.3 F | OXYGEN SATURATION: 97 %

## 2020-08-10 DIAGNOSIS — E11.9 TYPE 2 DIABETES MELLITUS WITHOUT COMPLICATION, WITHOUT LONG-TERM CURRENT USE OF INSULIN (HCC): Primary | Chronic | ICD-10-CM

## 2020-08-10 PROCEDURE — 99213 OFFICE O/P EST LOW 20 MIN: CPT | Performed by: FAMILY MEDICINE

## 2020-08-10 NOTE — PROGRESS NOTES
[facility]       NAME: Perlita Mac is a 50 y o  male  : 1965    MRN: [id number]  DATE: August 10, 2020  TIME: 2:07 PM    Assessment and Plan   Diagnoses and all orders for this visit:    Type 2 diabetes mellitus without complication, without long-term current use of insulin ([facility] )        No problem-specific Assessment & Plan notes found for this encounter  Patient Instructions           Chief Complaint     Chief Complaint   Patient presents with    Follow-up     no acute complaints per pt         History of Present Illness       Jose A Starks presents today with his caregiver for routine follow-up  He is very limited verbally/nonverbal at baseline He has a known history of seizure disorder, is followed by Neurology  He has a history diabetes, recently started Victoza  His caregiver reports blood sugars from   No episodes of hypoglycemia  Caregiver reports that blood sugar monitoring is a bit problematic to patient as it is painful, would like to decrease frequency  I have agreed to change the frequency from every day to every other day with the understanding that if he would feel weak tired shaky, sweaty or just on well blood sugar should be checked immediately  His caregiver verbalizes agreement understanding with this  They continue to work on diet  Will follow-up in about 2 months when next A1c is due    No other concerns today            Review of Systems   Review of Systems   Constitutional: Negative for chills, fatigue, fever and unexpected weight change  HENT: Negative for postnasal drip, sinus pressure and sore throat  Eyes: Negative for discharge  Respiratory: Negative for chest tightness, shortness of breath and wheezing  Cardiovascular: Negative for chest pain  Gastrointestinal: Negative for constipation, diarrhea and vomiting  Musculoskeletal: Negative for arthralgias  Skin: Negative for rash  Neurological: Negative for dizziness and syncope  Current Medications       Current Outpatient Medications:     benztropine (COGENTIN) 0 5 mg tablet, TAKE 1 TABLET BY MOUTH TWICE A DAY (8AM,8PM), Disp: 56 tablet, Rfl: 10    Blood Glucose Monitoring Suppl (ONETOUCH VERIO) w/Device KIT, by Does not apply route 3 (three) times a day TEST BLOOD SUGARS THREE TIMES, Disp: 1 kit, Rfl: 0    calcium carbonate (OYSTER SHELL,OSCAL) 500 mg, TAKE 1 TABLET BY MOUTH 3 TIMES A DAY (8AM,2PM,8PM), Disp: 84 each, Rfl: 10    cholecalciferol (VITAMIN D3) 25 mcg (1,000 units) tablet, TAKE 1 TABLET BY MOUTH DAILY (8AM) (DX: VITAMIN DAILY DEFICIENCY), Disp: 28 tablet, Rfl: 4    DEPAKOTE  MG 24 hr tablet, 1 tab hs for 3 days, then 1 tab bid for 1 week, then 1 tab in am and 2 tabs hs  Brand necessary, Disp: 90 tablet, Rfl: 11    docusate sodium (COLACE) 100 mg capsule, Take 1 capsule (100 mg total) by mouth 2 (two) times a day 8 AM and 8 PM, Disp: 60 capsule, Rfl: 2    gabapentin (NEURONTIN) 100 mg capsule, Take 100 mg by mouth 3 (three) times a day , Disp: , Rfl:     glucose blood (OneTouch Verio) test strip, TEST BLOOD SUGARS THREE TIMES DAILY, Disp: 100 each, Rfl: 5    hydrOXYzine pamoate (VISTARIL) 50 mg capsule, TAKE 1 CAPSULE BY MOUTH 3 TIMES A DAY, Disp: 84 capsule, Rfl: 10    ibuprofen (MOTRIN) 600 mg tablet, Take 1 tablet (600 mg total) by mouth every 8 (eight) hours, Disp: 60 tablet, Rfl: 0    Insulin Pen Needle (PEN NEEDLES) 31G X 5 MM MISC, by Does not apply route daily, Disp: 100 each, Rfl: 5    ketoconazole (NIZORAL) 2 % shampoo, Wash hair and sideburns at least 3 times per week, Disp: 120 mL, Rfl: 3    levETIRAcetam (KEPPRA) 750 mg tablet, Take 1125 mg (1 5 tabs) twice a day , Disp: 90 tablet, Rfl: 11    levothyroxine 125 mcg tablet, Take 1 tablet (125 mcg total) by mouth daily Take at 8 AM, Disp: 12 tablet, Rfl: 10    liraglutide (VICTOZA) injection, Inject 0 1 mL (0 6 mg total) under the skin daily for 7 days, THEN 0 2 mL (1 2 mg total) daily  , Disp: 8 mL, Rfl: 0    lisinopril (ZESTRIL) 10 mg tablet, Take 1 tablet (10 mg total) by mouth daily Take at 8 AM, Disp: 30 tablet, Rfl: 2    loratadine (ALLERGY) 10 mg tablet, Take 1 tablet (10 mg total) by mouth daily (Patient taking differently: Take 10 mg by mouth as needed ), Disp: 90 tablet, Rfl: 0    metFORMIN (GLUCOPHAGE) 1000 MG tablet, TAKE 1 TABLET BY MOUTH TWICE A DAY WITH MEALS (8AM, 8PM) (DX: DIABETES MELLITUS), Disp: 56 tablet, Rfl: 4    multivitamin (THERAGRAN) TABS, Take 1 tablet by mouth daily, Disp: 90 tablet, Rfl: 0    OneTouch Delica Lancets 21R MISC, by Does not apply route daily TEST BLOOD SUGARS THREE TIMES DAILY, Disp: 100 each, Rfl: 2    polyethylene glycol (MIRALAX) 17 g packet, Take 17 g by mouth daily, Disp: 14 each, Rfl: 8    QUEtiapine (SEROquel XR) 200 mg 24 hr tablet, TAKE 1 TABLET BY MOUTH TWICE A DAY (8AM,2PM), Disp: 16 tablet, Rfl: 10    QUEtiapine (SEROquel XR) 400 mg 24 hr tablet, TAKE 1 TABLET BY MOUTH AT BEDTIME (8PM) (DX: ANTIPSYCHOTIC), Disp: 28 tablet, Rfl: 10    simvastatin (ZOCOR) 40 mg tablet, TAKE 1 TABLET BY MOUTH DAILY (DX: HYPERLIPIDEMIA), Disp: 28 tablet, Rfl: 4    temazepam (RESTORIL) 7 5 mg capsule, Take 15 mg by mouth daily at bedtime as needed for sleep, Disp: , Rfl:     triamcinolone (KENALOG) 0 1 % cream, Apply topically 2 (two) times a day, Disp: 30 g, Rfl: 0    VIMPAT 150 MG tablet, TAKE 1 TABLET BY MOUTH EVERY 12 HOURS (DX:EPILEPSY), Disp: 56 tablet, Rfl: 4    vitamin B-12 (VITAMIN B-12) 1,000 mcg tablet, TAKE 1 TABLET BY MOUTH EVERY OTHER DAY (8AM), Disp: 14 tablet, Rfl: 10    Current Allergies     Allergies as of 08/10/2020 - Reviewed 08/10/2020   Allergen Reaction Noted    Erythromycin Other (See Comments) 11/22/2013    Penicillins Other (See Comments) 11/22/2013            The following portions of the patient's history were reviewed and updated as appropriate: allergies, current medications, past family history, past medical history, past social history, past surgical history and problem list      Past Medical History:   Diagnosis Date    [facility]) ([facility] ) 9/24/2019    [facility]     Depression     Diabetes ([place] [address] )     Disease of thyroid gland     Gait disorder     Hyperlipidemia     Hypertension     Kidney failure     Kidney stones     Osteoporosis     Psychiatric disorder     Scoliosis     Seizures ([place] [address] )     Thyroid disease     UTI (urinary tract infection)        Past Surgical History:   Procedure Laterality Date    APPENDECTOMY         History reviewed  No pertinent family history  Medications have been verified  Objective   /82 (BP Location: Right arm, Patient Position: Sitting, Cuff Size: Adult)   Pulse 96   Temp (!) 97 3 °F (36 3 °C) (Temporal)   Resp 18   Ht 6' (1 829 m)   Wt 78 kg (172 lb)   SpO2 97%   BMI 23 33 kg/m²        Physical Exam     Physical Exam  Vitals signs and nursing note reviewed  Constitutional:       General: He is not in acute distress  Appearance: He is well-developed  HENT:      Right Ear: External ear normal       Left Ear: External ear normal       Nose: Nose normal    Eyes:      Pupils: Pupils are equal, round, and reactive to light  Neck:      Musculoskeletal: Normal range of motion and neck supple  Cardiovascular:      Rate and Rhythm: Normal rate and regular rhythm  Heart sounds: Normal heart sounds  Pulmonary:      Effort: Pulmonary effort is normal       Breath sounds: Normal breath sounds  Abdominal:      Palpations: Abdomen is soft  Musculoskeletal: Normal range of motion  Lymphadenopathy:      Cervical: No cervical adenopathy  Skin:     General: Skin is warm and dry  Capillary Refill: Capillary refill takes less than 2 seconds  Neurological:      Mental Status: He is alert  Mental status is at baseline  all other ROS negative except as per HPI

## 2020-08-11 DIAGNOSIS — G23.2 PARKINSON'S PLUS SYNDROME (HCC): ICD-10-CM

## 2020-08-11 DIAGNOSIS — E55.9 VITAMIN D DEFICIENCY: ICD-10-CM

## 2020-08-12 RX ORDER — MELATONIN
Qty: 28 TABLET | Refills: 4 | Status: SHIPPED | OUTPATIENT
Start: 2020-08-12 | End: 2021-01-25

## 2020-08-12 RX ORDER — BENZTROPINE MESYLATE 0.5 MG/1
TABLET ORAL
Qty: 56 TABLET | Refills: 4 | Status: SHIPPED | OUTPATIENT
Start: 2020-08-12 | End: 2021-01-25

## 2020-09-10 DIAGNOSIS — E11.9 TYPE 2 DIABETES MELLITUS WITHOUT COMPLICATION, WITHOUT LONG-TERM CURRENT USE OF INSULIN (HCC): Chronic | ICD-10-CM

## 2020-09-10 RX ORDER — LIRAGLUTIDE 6 MG/ML
INJECTION SUBCUTANEOUS
Qty: 6 ML | Refills: 10 | Status: SHIPPED | OUTPATIENT
Start: 2020-09-10 | End: 2020-10-15 | Stop reason: ALTCHOICE

## 2020-09-19 ENCOUNTER — HOSPITAL ENCOUNTER (EMERGENCY)
Facility: HOSPITAL | Age: 55
Discharge: HOME | End: 2020-09-19
Attending: EMERGENCY MEDICINE | Admitting: EMERGENCY MEDICINE
Payer: MEDICARE

## 2020-09-19 VITALS
OXYGEN SATURATION: 96 % | TEMPERATURE: 97.5 F | SYSTOLIC BLOOD PRESSURE: 164 MMHG | DIASTOLIC BLOOD PRESSURE: 80 MMHG | HEART RATE: 77 BPM | RESPIRATION RATE: 20 BRPM | WEIGHT: 172 LBS | HEIGHT: 72 IN | BODY MASS INDEX: 23.3 KG/M2

## 2020-09-19 DIAGNOSIS — G40.919 BREAKTHROUGH SEIZURE (HCC): Primary | ICD-10-CM

## 2020-09-19 DIAGNOSIS — D69.6 THROMBOCYTOPENIA (HCC): ICD-10-CM

## 2020-09-19 LAB
ALBUMIN SERPL BCP-MCNC: 4.3 G/DL (ref 3.5–5.7)
ALP SERPL-CCNC: 37 U/L (ref 40–150)
ALT SERPL W P-5'-P-CCNC: 14 U/L (ref 7–52)
ANION GAP SERPL CALCULATED.3IONS-SCNC: 11 MMOL/L (ref 4–13)
APTT PPP: 26 SECONDS (ref 23–37)
AST SERPL W P-5'-P-CCNC: 20 U/L (ref 13–39)
BASOPHILS # BLD AUTO: 0 THOUSANDS/ΜL (ref 0–0.1)
BASOPHILS NFR BLD AUTO: 1 % (ref 0–2)
BILIRUB SERPL-MCNC: 0.3 MG/DL (ref 0.2–1)
BUN SERPL-MCNC: 21 MG/DL (ref 7–25)
CALCIUM SERPL-MCNC: 9.9 MG/DL (ref 8.6–10.5)
CHLORIDE SERPL-SCNC: 102 MMOL/L (ref 98–107)
CO2 SERPL-SCNC: 25 MMOL/L (ref 21–31)
CREAT SERPL-MCNC: 0.7 MG/DL (ref 0.7–1.3)
EOSINOPHIL # BLD AUTO: 0 THOUSAND/ΜL (ref 0–0.61)
EOSINOPHIL NFR BLD AUTO: 1 % (ref 0–5)
ERYTHROCYTE [DISTWIDTH] IN BLOOD BY AUTOMATED COUNT: 15 % (ref 11.5–14.5)
GFR SERPL CREATININE-BSD FRML MDRD: 108 ML/MIN/1.73SQ M
GLUCOSE SERPL-MCNC: 166 MG/DL (ref 65–99)
HCT VFR BLD AUTO: 38.1 % (ref 42–47)
HGB BLD-MCNC: 12.8 G/DL (ref 14–18)
INR PPP: 1.06 (ref 0.84–1.19)
LYMPHOCYTES # BLD AUTO: 2 THOUSANDS/ΜL (ref 0.6–4.47)
LYMPHOCYTES NFR BLD AUTO: 40 % (ref 21–51)
MCH RBC QN AUTO: 31.5 PG (ref 26–34)
MCHC RBC AUTO-ENTMCNC: 33.5 G/DL (ref 31–37)
MCV RBC AUTO: 94 FL (ref 81–99)
MONOCYTES # BLD AUTO: 0.6 THOUSAND/ΜL (ref 0.17–1.22)
MONOCYTES NFR BLD AUTO: 13 % (ref 2–12)
NEUTROPHILS # BLD AUTO: 2.2 THOUSANDS/ΜL (ref 1.4–6.5)
NEUTS SEG NFR BLD AUTO: 45 % (ref 42–75)
PLATELET # BLD AUTO: 118 THOUSANDS/UL (ref 149–390)
PMV BLD AUTO: 11.2 FL (ref 8.6–11.7)
POTASSIUM SERPL-SCNC: 3.8 MMOL/L (ref 3.5–5.5)
PROT SERPL-MCNC: 6.8 G/DL (ref 6.4–8.9)
PROTHROMBIN TIME: 13.7 SECONDS (ref 11.6–14.5)
RBC # BLD AUTO: 4.06 MILLION/UL (ref 4.3–5.9)
SODIUM SERPL-SCNC: 138 MMOL/L (ref 134–143)
TROPONIN I SERPL-MCNC: <0.03 NG/ML
VALPROATE SERPL-MCNC: 59.9 UG/ML (ref 50–125)
WBC # BLD AUTO: 5 THOUSAND/UL (ref 4.8–10.8)

## 2020-09-19 PROCEDURE — 99285 EMERGENCY DEPT VISIT HI MDM: CPT | Performed by: EMERGENCY MEDICINE

## 2020-09-19 PROCEDURE — 36415 COLL VENOUS BLD VENIPUNCTURE: CPT | Performed by: EMERGENCY MEDICINE

## 2020-09-19 PROCEDURE — 85025 COMPLETE CBC W/AUTO DIFF WBC: CPT | Performed by: EMERGENCY MEDICINE

## 2020-09-19 PROCEDURE — 99285 EMERGENCY DEPT VISIT HI MDM: CPT

## 2020-09-19 PROCEDURE — 80053 COMPREHEN METABOLIC PANEL: CPT | Performed by: EMERGENCY MEDICINE

## 2020-09-19 PROCEDURE — 84484 ASSAY OF TROPONIN QUANT: CPT | Performed by: EMERGENCY MEDICINE

## 2020-09-19 PROCEDURE — 85610 PROTHROMBIN TIME: CPT | Performed by: EMERGENCY MEDICINE

## 2020-09-19 PROCEDURE — 96365 THER/PROPH/DIAG IV INF INIT: CPT

## 2020-09-19 PROCEDURE — 80164 ASSAY DIPROPYLACETIC ACD TOT: CPT | Performed by: EMERGENCY MEDICINE

## 2020-09-19 PROCEDURE — 85730 THROMBOPLASTIN TIME PARTIAL: CPT | Performed by: EMERGENCY MEDICINE

## 2020-09-19 PROCEDURE — 93005 ELECTROCARDIOGRAM TRACING: CPT

## 2020-09-19 RX ORDER — ZONISAMIDE 100 MG/1
100 CAPSULE ORAL DAILY
COMMUNITY
End: 2020-11-05 | Stop reason: ALTCHOICE

## 2020-09-19 RX ADMIN — LEVETIRACETAM 500 MG: 100 INJECTION, SOLUTION INTRAVENOUS at 20:20

## 2020-09-19 NOTE — ED PROVIDER NOTES
History  Chief Complaint   Patient presents with    Seizure - Prior Hx Of     HPI    This is a 31-year-old gentleman presents the emergency department via [facility] s/p found unresponsive   Patient has a relevant medical history for generalized convulsive epilepsy: followed by Maisha Booker MD from [address] [place] [address] Neurology: Petr, cerebral palsy, thrombocytopenia (most recent platelet count on : 169: In the past most likely related to Depakote use  Other relevant medical history type 2 diabetes, hypertension, hypothyroidism, osteoporosis, scoliosis, cataracts, hyperlipidemia, vitamin-D deficiency  Patient's current seizure regimen:    - Depakote ER 500mg tablets (BRAND NECESSARY)- 1 tablet in the morning and 2 tablets at night  - Levetiracetam 750mg tablets- 1 5 tablets twice per day  - Lacosamide 150mg tablets- 1 tablet twice per day  Caregiver at the bedside and the patient appears at baseline  Caregiver noted that the patient was outside collecting leaves, twigs, acorns which he typically does  He was sitting on a grassy knoll and came back approx 1 minute later and was found on his stomach with his eyes rolling back  When   Prior to Admission Medications   Prescriptions Last Dose Informant Patient Reported? Taking? Blood Glucose Monitoring Suppl (ONETOUCH VERIO) w/Device KIT   No No   Sig: by Does not apply route 3 (three) times a day TEST BLOOD SUGARS THREE TIMES   DEPAKOTE  MG 24 hr tablet   No No   Si tab hs for 3 days, then 1 tab bid for 1 week, then 1 tab in am and 2 tabs hs     Brand necessary   Insulin Pen Needle (PEN NEEDLES) 31G X 5 MM MISC   No No   Sig: by Does not apply route daily   OneTouch Delica Lancets 92K MISC   No No   Sig: by Does not apply route daily TEST BLOOD SUGARS THREE TIMES DAILY   QUEtiapine (SEROquel XR) 200 mg 24 hr tablet   No No   Sig: TAKE 1 TABLET BY MOUTH TWICE A DAY (8AM,2PM)   QUEtiapine (SEROquel XR) 400 mg 24 hr tablet   No No   Sig: TAKE 1 TABLET BY MOUTH AT BEDTIME (8PM) (DX: ANTIPSYCHOTIC)   VIMPAT 150 MG tablet   No No   Sig: TAKE 1 TABLET BY MOUTH EVERY 12 HOURS (DX:EPILEPSY)   Victoza injection   No No   Sig: INJECT 0 1ML (0 6MG TOTAL) UNDER SKIN DAILY FOR 7 DAYS;THEN INJECT 0 2ML (1 2MG TOTAL) UNDER SKIN DAILY (DX: TYPE 2 DM)   benztropine (COGENTIN) 0 5 mg tablet   No No   Sig: TAKE 1 TABLET BY MOUTH TWICE A DAY (8AM,8PM)   calcium carbonate (OYSTER SHELL,OSCAL) 500 mg   No No   Sig: TAKE 1 TABLET BY MOUTH 3 TIMES A DAY (8AM,2PM,8PM)   cholecalciferol (VITAMIN D3) 1,000 units tablet   No No   Sig: TAKE 1 TABLET BY MOUTH DAILY (8AM) (DX: VITAMIN DAILY DEFICIENCY)   docusate sodium (COLACE) 100 mg capsule   No No   Sig: Take 1 capsule (100 mg total) by mouth 2 (two) times a day 8 AM and 8 PM   gabapentin (NEURONTIN) 100 mg capsule   Yes No   Sig: Take 100 mg by mouth 3 (three) times a day    glucose blood (OneTouch Verio) test strip   No No   Sig: TEST BLOOD SUGARS THREE TIMES DAILY   hydrOXYzine pamoate (VISTARIL) 50 mg capsule   No No   Sig: TAKE 1 CAPSULE BY MOUTH 3 TIMES A DAY   ibuprofen (MOTRIN) 600 mg tablet   No No   Sig: Take 1 tablet (600 mg total) by mouth every 8 (eight) hours   ketoconazole (NIZORAL) 2 % shampoo   No No   Sig: Wash hair and sideburns at least 3 times per week   levETIRAcetam (KEPPRA) 750 mg tablet   No No   Sig: Take 1125 mg (1 5 tabs) twice a day     levothyroxine 125 mcg tablet   No No   Sig: Take 1 tablet (125 mcg total) by mouth daily Take at 8 AM   lisinopril (ZESTRIL) 10 mg tablet   No No   Sig: Take 1 tablet (10 mg total) by mouth daily Take at 8 AM   loratadine (ALLERGY) 10 mg tablet   No No   Sig: Take 1 tablet (10 mg total) by mouth daily   Patient taking differently: Take 10 mg by mouth as needed    metFORMIN (GLUCOPHAGE) 1000 MG tablet   No No   Sig: TAKE 1 TABLET BY MOUTH TWICE A DAY WITH MEALS (8AM, 8PM) (DX: DIABETES MELLITUS)   multivitamin (THERAGRAN) TABS   No No   Sig: Take 1 tablet by mouth daily polyethylene glycol (MIRALAX) 17 g packet   No No   Sig: Take 17 g by mouth daily   simvastatin (ZOCOR) 40 mg tablet   No No   Sig: TAKE 1 TABLET BY MOUTH DAILY (DX: HYPERLIPIDEMIA)   temazepam (RESTORIL) 7 5 mg capsule   Yes No   Sig: Take 15 mg by mouth daily at bedtime as needed for sleep   triamcinolone (KENALOG) 0 1 % cream   No No   Sig: Apply topically 2 (two) times a day   vitamin B-12 (VITAMIN B-12) 1,000 mcg tablet   No No   Sig: TAKE 1 TABLET BY MOUTH EVERY OTHER DAY (8AM)   zonisamide (ZONEGRAN) 100 mg capsule   Yes Yes   Sig: Take 100 mg by mouth daily      Facility-Administered Medications: None       Past Medical History:   Diagnosis Date    ADRIANA (acute kidney injury) ([place] [address] ) 9/24/2019    [facility]     Depression     Diabetes ([place] [address] )     Disease of thyroid gland     Gait disorder     Hyperlipidemia     Hypertension     Kidney failure     Kidney stones     Osteoporosis     Psychiatric disorder     Scoliosis     Seizures ([place] [address] )     Thyroid disease     UTI (urinary tract infection)        Past Surgical History:   Procedure Laterality Date    APPENDECTOMY         History reviewed  No pertinent family history  I have reviewed and agree with the history as documented  E-Cigarette/Vaping     E-Cigarette/Vaping Substances     Social History     Tobacco Use    Smoking status: Never Smoker    Smokeless tobacco: Never Used   Substance Use Topics    Alcohol use: Never     Frequency: Never    Drug use: No       Review of Systems   Unable to perform ROS: Patient nonverbal       Physical Exam  Physical Exam  Vitals signs and nursing note reviewed  Constitutional:       Appearance: He is underweight  He is not ill-appearing, toxic-appearing or diaphoretic  HENT:      Head: Normocephalic and atraumatic  Comments: Patient maintaining airway maintaining secretions  No stridor  No brawniness under the tongue  Uvula midline without edema  No bit marks inside mouth  Right Ear: Tympanic membrane, ear canal and external ear normal       Left Ear: Tympanic membrane and external ear normal       Nose: Nose normal       Mouth/Throat:      Mouth: Mucous membranes are moist       Pharynx: Oropharynx is clear  Eyes:      Extraocular Movements: Extraocular movements intact  Pupils: Pupils are equal, round, and reactive to light  Neck:      Musculoskeletal: Normal range of motion and neck supple  Cardiovascular:      Rate and Rhythm: Normal rate  Pulses: Normal pulses  Heart sounds: Normal heart sounds  Pulmonary:      Effort: Pulmonary effort is normal       Breath sounds: Normal breath sounds  Abdominal:      General: Abdomen is flat  Bowel sounds are normal    Musculoskeletal: Normal range of motion  Skin:     General: Skin is warm  Capillary Refill: Capillary refill takes less than 2 seconds  Neurological:      General: No focal deficit present  Mental Status: He is alert  Mental status is at baseline     Psychiatric:         Mood and Affect: Mood normal          Vital Signs  ED Triage Vitals [09/19/20 1818]   Temperature Pulse Respirations Blood Pressure SpO2   (!) 97 2 °F (36 2 °C) 78 16 164/80 98 %      Temp Source Heart Rate Source Patient Position - Orthostatic VS BP Location FiO2 (%)   Temporal Monitor Sitting Left arm --      Pain Score       No Pain           Vitals:    09/19/20 1818   BP: 164/80   Pulse: 78   Patient Position - Orthostatic VS: Sitting         Visual Acuity      ED Medications  Medications   levETIRAcetam (KEPPRA) 500 mg in sodium chloride 0 9 % 100 mL IVPB (500 mg Intravenous New Bag 9/19/20 2020)       Diagnostic Studies  Results Reviewed     Procedure Component Value Units Date/Time    Levetiracetam level [[id number]]     Lab Status:  No result Specimen:  Blood     Lacosamide [[id number]]     Lab Status:  No result Specimen:  Blood     Comprehensive metabolic panel [[id number]]  (Abnormal) Collected:  09/19/20 1828    Lab Status:  Final result Specimen:  Blood from Arm, Right Updated:  09/19/20 1859     Sodium 138 mmol/L      Potassium 3 8 mmol/L      Chloride 102 mmol/L      CO2 25 mmol/L      ANION GAP 11 mmol/L      BUN 21 mg/dL      Creatinine 0 70 mg/dL      Glucose 166 mg/dL      Calcium 9 9 mg/dL      AST 20 U/L      ALT 14 U/L      Alkaline Phosphatase 37 U/L      Total Protein 6 8 g/dL      Albumin 4 3 g/dL      Total Bilirubin 0 30 mg/dL      eGFR 108 ml/min/1 73sq m     Narrative:       [place] guidelines for Chronic Kidney Disease (CKD):     Stage 1 with normal or high GFR (GFR > 90 mL/min/1 73 square meters)    Stage 2 Mild CKD (GFR = 60-89 mL/min/1 73 square meters)    Stage 3A Moderate CKD (GFR = 45-59 mL/min/1 73 square meters)    Stage 3B Moderate CKD (GFR = 30-44 mL/min/1 73 square meters)    Stage 4 Severe CKD (GFR = 15-29 mL/min/1 73 square meters)    Stage 5 End Stage CKD (GFR <15 mL/min/1 73 square meters)  Note: GFR calculation is accurate only with a steady state creatinine    Troponin I [[id number]]  (Normal) Collected:  09/19/20 1828    Lab Status:  Final result Specimen:  Blood from Arm, Right Updated:  09/19/20 1857     Troponin I <0 03 ng/mL     Valproic acid level, total [[id number]]  (Normal) Collected:  09/19/20 1828    Lab Status:  Final result Specimen:  Blood from Arm, Right Updated:  09/19/20 1857     Valproic Acid, Total 59 9 ug/mL     Protime-INR [[id number]]  (Normal) Collected:  09/19/20 1828    Lab Status:  Final result Specimen:  Blood from Arm, Right Updated:  09/19/20 1849     Protime 13 7 seconds      INR 1 06    APTT [[id number]]  (Normal) Collected:  09/19/20 1828    Lab Status:  Final result Specimen:  Blood from Arm, Right Updated:  09/19/20 1849     PTT 26 seconds     CBC and differential [[id number]]  (Abnormal) Collected:  09/19/20 1828    Lab Status:  Final result Specimen:  Blood from Arm, Right Updated:  09/19/20 1836     WBC 5 00 Thousand/uL      RBC 4 06 Million/uL      Hemoglobin 12 8 g/dL      Hematocrit 38 1 %      MCV 94 fL      MCH 31 5 pg      MCHC 33 5 g/dL      RDW 15 0 %      MPV 11 2 fL      Platelets 889 Thousands/uL      Neutrophils Relative 45 %      Lymphocytes Relative 40 %      Monocytes Relative 13 %      Eosinophils Relative 1 %      Basophils Relative 1 %      Neutrophils Absolute 2 20 Thousands/µL      Lymphocytes Absolute 2 00 Thousands/µL      Monocytes Absolute 0 60 Thousand/µL      Eosinophils Absolute 0 00 Thousand/µL      Basophils Absolute 0 00 Thousands/µL                  No orders to display              Procedures  ECG 12 Lead Documentation Only    Date/Time: 9/19/2020 6:23 PM  Performed by: Kevin Phelan III, DO  Authorized by: Kevin Phelan III, DO     Indications / Diagnosis:  Possible unresponsive episode vs  seizure  ECG reviewed by me, the ED Provider: yes    Patient location:  ED  Comments:      I personally reviewed this EKG was performed the patient September 19, 2020  EKG was completed at 6:21 p m  And interpreted by me at 6:23 p m   Normal sinus rhythm with a right bundle-branch block noted  Ventricular rate is 80 beats per minute  Patient's p r n  Oval 174 milliseconds with a QRS duration 162 milliseconds  ED Course  ED Course as of Sep 19 2038   Sat Sep 19, 2020   1387 Late entry: History and physical completed  Discussed with caregiver who followed the ambulance to the emergency department  It was noted that the patient likes to crawl on grass and pick leaves / twigs, acorns off the ground and place them into paper bag  The caregiver went inside, for less than a minute to retrieve something for dinner, came out and found the patient on the ground, and a prone position, when he rolled the patient over the patient eyes were rolled back in his head  The caregiver noted that he left the patient sitting on the ground in a grassy area with no rocks trees or concrete areas around him    Patient is current at his baseline  1858 Noted platelet count of 414, hx of thrombocytopenia  1907 Noted Depakote level, will add Keppra level, and lacosamide level  Patient is on 2,250 mg of Keppra daily, will order IV keppra: 500 mg       2005 Multiple attempts at obtaining keppra and lacosamide levels, I attempted x 1 no success  MDM  Number of Diagnoses or Management Options  Breakthrough seizure [facility]):   Diagnosis management comments: This is a 61-year-old gentleman presents the emergency department, known history of cerebral palsy with seizure disorder:  Managed by 3 medication: depakote, keppra, and lacosamide  The Depakote level is therapeutic, patient was loaded with Keppra  As per the patient's caregiver he reports that the patient returned to baseline, stable for d/c  F/up with neurology as an outpt :  I sent a message to Lola Reddy MD from [address] [place] [address] Neurology: Petr via [employer] to have there office call care giver  Portions of the record may have been created with voice recognition software  Occasional wrong word or "sound a like" substitutions may have occurred due to the inherent limitations of voice recognition software  Read the chart carefully and recognize, using context, where substitutions have occurred         Amount and/or Complexity of Data Reviewed  Clinical lab tests: ordered and reviewed  Tests in the medicine section of CPT®: ordered and reviewed        Disposition  Final diagnoses:   Breakthrough seizure ([address] )   Thrombocytopenia ([address] )     Time reflects when diagnosis was documented in both MDM as applicable and the Disposition within this note     Time User Action Codes Description Comment    9/19/2020  8:29 PM Macy Heath Add [G40 919] Breakthrough seizure ([address] )     9/19/2020  8:37 PM Macy Heath Add [D69 6] Thrombocytopenia [facility])       ED Disposition     ED Disposition Condition Date/Time Comment    Discharge Stable Sat Sep 19, 2020 8:33 PM Gila Sonireck discharge to home/self care  Follow-up Information     Follow up With Specialties Details Why [address] [facility]  Nurse Practitioner   [address]      Deven Leal MD Neurology Call  [facility] [address]  [address]  [address]  [phone]            Patient's Medications   Discharge Prescriptions    No medications on file     No discharge procedures on file      PDMP Review       Value Time User    PDMP Reviewed  Yes 10/25/2019  3:28 PM Timoteo Dexter MD          ED Provider  Electronically Signed by           Danielle Cm III, DO  09/19/20 2042

## 2020-09-21 ENCOUNTER — TELEPHONE (OUTPATIENT)
Dept: NEUROLOGY | Facility: CLINIC | Age: 55
End: 2020-09-21

## 2020-09-21 DIAGNOSIS — D69.1 THROMBOCYTOPATHIA (HCC): ICD-10-CM

## 2020-09-21 DIAGNOSIS — G40.309 GENERALIZED CONVULSIVE EPILEPSY (HCC): Primary | Chronic | ICD-10-CM

## 2020-09-21 LAB
ATRIAL RATE: 80 BPM
P AXIS: 69 DEGREES
PR INTERVAL: 174 MS
QRS AXIS: 60 DEGREES
QRSD INTERVAL: 162 MS
QT INTERVAL: 440 MS
QTC INTERVAL: 507 MS
T WAVE AXIS: 26 DEGREES
VENTRICULAR RATE: 80 BPM

## 2020-09-21 PROCEDURE — 93010 ELECTROCARDIOGRAM REPORT: CPT | Performed by: INTERNAL MEDICINE

## 2020-09-21 NOTE — TELEPHONE ENCOUNTER
Tino Slater, caregiver, calling in  He states patient had a seizure this past weekend  He did not witness seizure  He states that he walked out of the kitchen for several minutes and when he returned, patient was on the floor  He called EMS and patient was brought to [facility] ED (note avail for review)  He denies missed medications, recent or current illness, new medications, etoh or drug use, stress  He does note that patient does not have a consistent sleep schedule and oftentimes, does not sleep through the entire night  Patient takes below medications:    Depakote ER 500mg tabs, 1 tab in AM and 2 tabs HS  Keppra 750mg tabs, 1 5 tabs BID (1125mg total)  VImpat 150mg tab, 1 tab BID    Tino Slater states that the ED wanted to offer Melatonin 10mg to help with patient's sleep  Wondering if this is ok? Also ED recommended that he have a seizure protocol if patient has a seizure, when to and when to not take patient to ED     Please advise    [phone] [address] to leave a detailed message

## 2020-09-21 NOTE — TELEPHONE ENCOUNTER
Spoke to Chhaya Evans (care-giver) and informed him that its okay for Adra Tyra to take Melatonin 10 mg at bedtime  Seizure first aid protocol along with BW scripts and appointment card mailed to address on file per Chhaya Evans request   Patient is schedule to see Geofm Councilman on  11/5/2020 in [facility]

## 2020-09-21 NOTE — TELEPHONE ENCOUNTER
Ok to take Melatonin 10 mg at bedtime  We will send him the Seizure first aid protocol [facility] [place] can you please mail this) which reviews all the relavent information about when to call 911 etc   Please have him get new blood work in 1 month and needs an appointment with either Ailyn or Dr Dain Lin in 6-8 weeks  Thanks!

## 2020-10-06 NOTE — TELEPHONE ENCOUNTER
Jojo Smith calling in, (supervisor of caregivers) calling in  She wanted to report seizure below  I made her aware that we did speak to julio  Made her aware of need for labs and for appt on 11/5, she verbalized understanding  Nothing needed at this time

## 2020-10-13 DIAGNOSIS — I10 HYPERTENSION, UNSPECIFIED TYPE: ICD-10-CM

## 2020-10-13 DIAGNOSIS — G40.909 SEIZURE DISORDER (HCC): ICD-10-CM

## 2020-10-13 RX ORDER — LISINOPRIL 10 MG/1
TABLET ORAL
Qty: 28 TABLET | Refills: 4 | Status: SHIPPED | OUTPATIENT
Start: 2020-10-13 | End: 2021-03-19

## 2020-10-14 RX ORDER — LEVETIRACETAM 750 MG/1
TABLET ORAL
Qty: 90 TABLET | Refills: 5 | Status: SHIPPED | OUTPATIENT
Start: 2020-10-14 | End: 2021-01-09 | Stop reason: DRUGHIGH

## 2020-10-15 ENCOUNTER — OFFICE VISIT (OUTPATIENT)
Dept: FAMILY MEDICINE CLINIC | Facility: HOME HEALTHCARE | Age: 55
End: 2020-10-15
Payer: MEDICARE

## 2020-10-15 VITALS
BODY MASS INDEX: 22.21 KG/M2 | WEIGHT: 164 LBS | HEART RATE: 78 BPM | SYSTOLIC BLOOD PRESSURE: 110 MMHG | RESPIRATION RATE: 18 BRPM | OXYGEN SATURATION: 97 % | HEIGHT: 72 IN | TEMPERATURE: 97.2 F | DIASTOLIC BLOOD PRESSURE: 80 MMHG

## 2020-10-15 DIAGNOSIS — Z23 IMMUNIZATION DUE: ICD-10-CM

## 2020-10-15 DIAGNOSIS — Z12.11 COLON CANCER SCREENING: Primary | ICD-10-CM

## 2020-10-15 DIAGNOSIS — G40.309 GENERALIZED CONVULSIVE EPILEPSY (HCC): Chronic | ICD-10-CM

## 2020-10-15 DIAGNOSIS — E11.9 TYPE 2 DIABETES MELLITUS WITHOUT COMPLICATION, WITHOUT LONG-TERM CURRENT USE OF INSULIN (HCC): Chronic | ICD-10-CM

## 2020-10-15 DIAGNOSIS — I10 ESSENTIAL HYPERTENSION: Chronic | ICD-10-CM

## 2020-10-15 LAB — SL AMB POCT HEMOGLOBIN AIC: 5.4 (ref ?–6.5)

## 2020-10-15 PROCEDURE — 99213 OFFICE O/P EST LOW 20 MIN: CPT | Performed by: FAMILY MEDICINE

## 2020-10-19 DIAGNOSIS — E53.8 B12 DEFICIENCY: ICD-10-CM

## 2020-10-19 RX ORDER — LANOLIN ALCOHOL/MO/W.PET/CERES
CREAM (GRAM) TOPICAL
Qty: 14 TABLET | Refills: 10 | Status: SHIPPED | OUTPATIENT
Start: 2020-10-19 | End: 2021-07-26

## 2020-10-20 ENCOUNTER — LAB (OUTPATIENT)
Dept: LAB | Facility: CLINIC | Age: 55
End: 2020-10-20
Payer: MEDICARE

## 2020-10-20 DIAGNOSIS — E55.9 VITAMIN D DEFICIENCY: ICD-10-CM

## 2020-10-20 DIAGNOSIS — D69.1 THROMBOCYTOPATHIA (HCC): ICD-10-CM

## 2020-10-20 DIAGNOSIS — G40.309 GENERALIZED CONVULSIVE EPILEPSY (HCC): Chronic | ICD-10-CM

## 2020-10-20 LAB
25(OH)D3 SERPL-MCNC: 47.1 NG/ML (ref 30–100)
ERYTHROCYTE [DISTWIDTH] IN BLOOD BY AUTOMATED COUNT: 14.5 % (ref 11.6–15.1)
HCT VFR BLD AUTO: 40.4 % (ref 36.5–49.3)
HGB BLD-MCNC: 13 G/DL (ref 12–17)
MCH RBC QN AUTO: 31 PG (ref 26.8–34.3)
MCHC RBC AUTO-ENTMCNC: 32.2 G/DL (ref 31.4–37.4)
MCV RBC AUTO: 96 FL (ref 82–98)
PLATELET # BLD AUTO: 120 THOUSANDS/UL (ref 149–390)
PMV BLD AUTO: 13.5 FL (ref 8.9–12.7)
RBC # BLD AUTO: 4.19 MILLION/UL (ref 3.88–5.62)
VALPROATE SERPL-MCNC: 92 UG/ML (ref 50–100)
WBC # BLD AUTO: 3.79 THOUSAND/UL (ref 4.31–10.16)

## 2020-10-20 PROCEDURE — 36415 COLL VENOUS BLD VENIPUNCTURE: CPT

## 2020-10-20 PROCEDURE — 80235 DRUG ASSAY LACOSAMIDE: CPT

## 2020-10-20 PROCEDURE — 80164 ASSAY DIPROPYLACETIC ACD TOT: CPT

## 2020-10-20 PROCEDURE — 82306 VITAMIN D 25 HYDROXY: CPT

## 2020-10-20 PROCEDURE — 85027 COMPLETE CBC AUTOMATED: CPT

## 2020-10-20 PROCEDURE — 80177 DRUG SCRN QUAN LEVETIRACETAM: CPT

## 2020-10-22 ENCOUNTER — TELEPHONE (OUTPATIENT)
Dept: NEUROLOGY | Facility: CLINIC | Age: 55
End: 2020-10-22

## 2020-10-22 LAB — LEVETIRACETAM SERPL-MCNC: 23 UG/ML (ref 10–40)

## 2020-10-23 LAB — LACOSAMIDE SERPL-MCNC: 3.2 UG/ML (ref 5–10)

## 2020-10-29 ENCOUNTER — OFFICE VISIT (OUTPATIENT)
Dept: URGENT CARE | Facility: CLINIC | Age: 55
End: 2020-10-29
Payer: MEDICARE

## 2020-10-29 VITALS
RESPIRATION RATE: 18 BRPM | SYSTOLIC BLOOD PRESSURE: 132 MMHG | OXYGEN SATURATION: 98 % | DIASTOLIC BLOOD PRESSURE: 70 MMHG | BODY MASS INDEX: 22.21 KG/M2 | TEMPERATURE: 97.7 F | HEART RATE: 90 BPM | HEIGHT: 72 IN | WEIGHT: 164 LBS

## 2020-10-29 DIAGNOSIS — Z91.09 ENVIRONMENTAL ALLERGIES: Primary | ICD-10-CM

## 2020-10-29 DIAGNOSIS — R05.9 COUGH: ICD-10-CM

## 2020-10-29 PROCEDURE — G0463 HOSPITAL OUTPT CLINIC VISIT: HCPCS | Performed by: PHYSICIAN ASSISTANT

## 2020-10-29 PROCEDURE — 99213 OFFICE O/P EST LOW 20 MIN: CPT | Performed by: PHYSICIAN ASSISTANT

## 2020-10-29 RX ORDER — LORATADINE 10 MG/1
TABLET ORAL
Qty: 90 TABLET | Refills: 0 | Status: SHIPPED | OUTPATIENT
Start: 2020-10-29 | End: 2022-03-29

## 2020-10-31 DIAGNOSIS — G40.309 GENERALIZED CONVULSIVE EPILEPSY (HCC): ICD-10-CM

## 2020-10-31 DIAGNOSIS — E08.00 DIABETES MELLITUS DUE TO UNDERLYING CONDITION WITH HYPEROSMOLARITY WITHOUT COMA, WITHOUT LONG-TERM CURRENT USE OF INSULIN (HCC): ICD-10-CM

## 2020-10-31 DIAGNOSIS — K59.00 CONSTIPATION, UNSPECIFIED CONSTIPATION TYPE: ICD-10-CM

## 2020-11-02 DIAGNOSIS — G40.309 GENERALIZED CONVULSIVE EPILEPSY (HCC): ICD-10-CM

## 2020-11-02 RX ORDER — LACOSAMIDE 150 MG/1
TABLET, FILM COATED ORAL
Qty: 60 TABLET | Refills: 5 | Status: SHIPPED | OUTPATIENT
Start: 2020-11-02 | End: 2020-11-05 | Stop reason: SDUPTHER

## 2020-11-02 RX ORDER — DOCUSATE SODIUM 100 MG
CAPSULE ORAL
Qty: 56 CAPSULE | Refills: 4 | Status: SHIPPED | OUTPATIENT
Start: 2020-11-02 | End: 2021-04-13

## 2020-11-02 RX ORDER — DIVALPROEX SODIUM 500 MG
TABLET, EXTENDED RELEASE 24 HR ORAL
Qty: 90 TABLET | Refills: 5 | Status: SHIPPED | OUTPATIENT
Start: 2020-11-02 | End: 2021-05-14

## 2020-11-05 ENCOUNTER — TELEPHONE (OUTPATIENT)
Dept: URGENT CARE | Facility: CLINIC | Age: 55
End: 2020-11-05

## 2020-11-05 ENCOUNTER — TELEMEDICINE (OUTPATIENT)
Dept: NEUROLOGY | Facility: CLINIC | Age: 55
End: 2020-11-05
Payer: MEDICARE

## 2020-11-05 VITALS — BODY MASS INDEX: 22.62 KG/M2 | WEIGHT: 167 LBS | HEIGHT: 72 IN

## 2020-11-05 DIAGNOSIS — E55.9 VITAMIN D DEFICIENCY: ICD-10-CM

## 2020-11-05 DIAGNOSIS — D69.1 THROMBOCYTOPATHIA (HCC): ICD-10-CM

## 2020-11-05 DIAGNOSIS — G80.9 CEREBRAL PALSY, UNSPECIFIED TYPE (HCC): ICD-10-CM

## 2020-11-05 DIAGNOSIS — E53.8 VITAMIN B12 DEFICIENCY: ICD-10-CM

## 2020-11-05 DIAGNOSIS — G40.309 GENERALIZED CONVULSIVE EPILEPSY (HCC): Primary | ICD-10-CM

## 2020-11-05 PROCEDURE — 99213 OFFICE O/P EST LOW 20 MIN: CPT | Performed by: NURSE PRACTITIONER

## 2020-11-05 RX ORDER — LACOSAMIDE 200 MG/1
200 TABLET ORAL EVERY 12 HOURS SCHEDULED
Qty: 180 TABLET | Refills: 3 | Status: SHIPPED | OUTPATIENT
Start: 2020-11-05 | End: 2021-03-19

## 2020-11-10 ENCOUNTER — OFFICE VISIT (OUTPATIENT)
Dept: FAMILY MEDICINE CLINIC | Facility: HOME HEALTHCARE | Age: 55
End: 2020-11-10
Payer: MEDICARE

## 2020-11-10 VITALS
TEMPERATURE: 97.7 F | DIASTOLIC BLOOD PRESSURE: 80 MMHG | BODY MASS INDEX: 22.11 KG/M2 | HEART RATE: 86 BPM | OXYGEN SATURATION: 97 % | RESPIRATION RATE: 16 BRPM | SYSTOLIC BLOOD PRESSURE: 124 MMHG | WEIGHT: 163 LBS

## 2020-11-10 DIAGNOSIS — J06.9 VIRAL URI WITH COUGH: Primary | ICD-10-CM

## 2020-11-10 PROCEDURE — 99213 OFFICE O/P EST LOW 20 MIN: CPT | Performed by: FAMILY MEDICINE

## 2020-11-10 RX ORDER — BENZONATATE 200 MG/1
200 CAPSULE ORAL 3 TIMES DAILY PRN
Qty: 20 CAPSULE | Refills: 0 | Status: SHIPPED | OUTPATIENT
Start: 2020-11-10 | End: 2021-12-23 | Stop reason: HOSPADM

## 2020-11-10 RX ORDER — HUMIDIFIER
EACH MISCELLANEOUS
Qty: 1 EACH | Refills: 0 | Status: SHIPPED | OUTPATIENT
Start: 2020-11-10 | End: 2022-03-21

## 2020-12-05 DIAGNOSIS — I10 HYPERTENSION, UNSPECIFIED TYPE: ICD-10-CM

## 2020-12-05 DIAGNOSIS — E03.8 HYPOTHYROIDISM DUE TO HASHIMOTO'S THYROIDITIS: ICD-10-CM

## 2020-12-05 DIAGNOSIS — G62.9 NEUROPATHY: ICD-10-CM

## 2020-12-05 DIAGNOSIS — R56.9 SEIZURE (HCC): ICD-10-CM

## 2020-12-05 DIAGNOSIS — E08.00 DIABETES MELLITUS DUE TO UNDERLYING CONDITION WITH HYPEROSMOLARITY WITHOUT COMA, WITHOUT LONG-TERM CURRENT USE OF INSULIN (HCC): ICD-10-CM

## 2020-12-05 DIAGNOSIS — E06.3 HYPOTHYROIDISM DUE TO HASHIMOTO'S THYROIDITIS: ICD-10-CM

## 2020-12-05 DIAGNOSIS — E78.5 HYPERLIPIDEMIA, UNSPECIFIED HYPERLIPIDEMIA TYPE: ICD-10-CM

## 2020-12-05 RX ORDER — MULTIVITAMIN WITH FOLIC ACID 400 MCG
TABLET ORAL
Qty: 28 TABLET | Refills: 4 | Status: SHIPPED | OUTPATIENT
Start: 2020-12-05 | End: 2021-05-10

## 2020-12-05 RX ORDER — SIMVASTATIN 40 MG
TABLET ORAL
Qty: 28 TABLET | Refills: 4 | Status: SHIPPED | OUTPATIENT
Start: 2020-12-05 | End: 2021-05-10

## 2020-12-05 RX ORDER — CALCIUM CARBONATE 500(1250)
TABLET ORAL
Qty: 84 EACH | Refills: 4 | Status: SHIPPED | OUTPATIENT
Start: 2020-12-05 | End: 2021-05-10

## 2020-12-05 RX ORDER — LEVOTHYROXINE SODIUM 0.12 MG/1
TABLET ORAL
Qty: 28 TABLET | Refills: 4 | Status: SHIPPED | OUTPATIENT
Start: 2020-12-05 | End: 2021-05-10

## 2021-01-09 ENCOUNTER — HOSPITAL ENCOUNTER (EMERGENCY)
Facility: HOSPITAL | Age: 56
Discharge: HOME | End: 2021-01-09
Attending: EMERGENCY MEDICINE | Admitting: EMERGENCY MEDICINE
Payer: MEDICARE

## 2021-01-09 VITALS
HEART RATE: 85 BPM | SYSTOLIC BLOOD PRESSURE: 150 MMHG | OXYGEN SATURATION: 94 % | DIASTOLIC BLOOD PRESSURE: 77 MMHG | TEMPERATURE: 97.1 F | RESPIRATION RATE: 18 BRPM | BODY MASS INDEX: 21.53 KG/M2 | WEIGHT: 158.73 LBS

## 2021-01-09 DIAGNOSIS — R25.9 INVOLUNTARY MOVEMENTS: Primary | ICD-10-CM

## 2021-01-09 LAB
ALBUMIN SERPL BCP-MCNC: 4.4 G/DL (ref 3.5–5.7)
ALP SERPL-CCNC: 39 U/L (ref 40–150)
ALT SERPL W P-5'-P-CCNC: 12 U/L (ref 7–52)
ANION GAP SERPL CALCULATED.3IONS-SCNC: 5 MMOL/L (ref 4–13)
AST SERPL W P-5'-P-CCNC: 14 U/L (ref 13–39)
BASOPHILS # BLD AUTO: 0 THOUSANDS/ΜL (ref 0–0.1)
BASOPHILS NFR BLD AUTO: 1 % (ref 0–2)
BILIRUB SERPL-MCNC: 0.1 MG/DL (ref 0.2–1)
BUN SERPL-MCNC: 28 MG/DL (ref 7–25)
CALCIUM SERPL-MCNC: 9.6 MG/DL (ref 8.6–10.5)
CHLORIDE SERPL-SCNC: 103 MMOL/L (ref 98–107)
CO2 SERPL-SCNC: 29 MMOL/L (ref 21–31)
CREAT SERPL-MCNC: 0.73 MG/DL (ref 0.7–1.3)
EOSINOPHIL # BLD AUTO: 0.2 THOUSAND/ΜL (ref 0–0.61)
EOSINOPHIL NFR BLD AUTO: 3 % (ref 0–5)
ERYTHROCYTE [DISTWIDTH] IN BLOOD BY AUTOMATED COUNT: 13.8 % (ref 11.5–14.5)
GFR SERPL CREATININE-BSD FRML MDRD: 106 ML/MIN/1.73SQ M
GLUCOSE SERPL-MCNC: 135 MG/DL (ref 65–99)
HCT VFR BLD AUTO: 39.7 % (ref 42–47)
HGB BLD-MCNC: 13.1 G/DL (ref 14–18)
LYMPHOCYTES # BLD AUTO: 2.8 THOUSANDS/ΜL (ref 0.6–4.47)
LYMPHOCYTES NFR BLD AUTO: 48 % (ref 21–51)
MCH RBC QN AUTO: 31.7 PG (ref 26–34)
MCHC RBC AUTO-ENTMCNC: 33 G/DL (ref 31–37)
MCV RBC AUTO: 96 FL (ref 81–99)
MONOCYTES # BLD AUTO: 0.7 THOUSAND/ΜL (ref 0.17–1.22)
MONOCYTES NFR BLD AUTO: 12 % (ref 2–12)
NEUTROPHILS # BLD AUTO: 2.1 THOUSANDS/ΜL (ref 1.4–6.5)
NEUTS SEG NFR BLD AUTO: 36 % (ref 42–75)
PLATELET # BLD AUTO: 145 THOUSANDS/UL (ref 149–390)
PMV BLD AUTO: 10.6 FL (ref 8.6–11.7)
POTASSIUM SERPL-SCNC: 3.7 MMOL/L (ref 3.5–5.5)
PROT SERPL-MCNC: 6.8 G/DL (ref 6.4–8.9)
RBC # BLD AUTO: 4.14 MILLION/UL (ref 4.3–5.9)
SODIUM SERPL-SCNC: 137 MMOL/L (ref 134–143)
VALPROATE SERPL-MCNC: 88.2 UG/ML (ref 50–125)
WBC # BLD AUTO: 5.7 THOUSAND/UL (ref 4.8–10.8)

## 2021-01-09 PROCEDURE — 36415 COLL VENOUS BLD VENIPUNCTURE: CPT | Performed by: PHYSICIAN ASSISTANT

## 2021-01-09 PROCEDURE — 80177 DRUG SCRN QUAN LEVETIRACETAM: CPT | Performed by: PHYSICIAN ASSISTANT

## 2021-01-09 PROCEDURE — 85025 COMPLETE CBC W/AUTO DIFF WBC: CPT | Performed by: PHYSICIAN ASSISTANT

## 2021-01-09 PROCEDURE — 80053 COMPREHEN METABOLIC PANEL: CPT | Performed by: PHYSICIAN ASSISTANT

## 2021-01-09 PROCEDURE — 99284 EMERGENCY DEPT VISIT MOD MDM: CPT | Performed by: PHYSICIAN ASSISTANT

## 2021-01-09 PROCEDURE — 80235 DRUG ASSAY LACOSAMIDE: CPT | Performed by: PHYSICIAN ASSISTANT

## 2021-01-09 PROCEDURE — 99283 EMERGENCY DEPT VISIT LOW MDM: CPT

## 2021-01-09 PROCEDURE — 80164 ASSAY DIPROPYLACETIC ACD TOT: CPT | Performed by: PHYSICIAN ASSISTANT

## 2021-01-09 RX ORDER — LEVETIRACETAM 750 MG/1
1500 TABLET ORAL 2 TIMES DAILY
Qty: 56 TABLET | Refills: 0 | Status: SHIPPED | OUTPATIENT
Start: 2021-01-09 | End: 2021-01-26 | Stop reason: SDUPTHER

## 2021-01-10 NOTE — ED PROVIDER NOTES
History  Chief Complaint   Patient presents with    Involuntary Movements     pt with seizure history , caregiver concerned because pt is having increased involuntary jerking movements over the last day       77-year-old male presents the emergency department accompanied by caregiver from assisted care facility with concern for increasing frequency and amplitude of involuntary jerking movements of the bilateral upper extremities  The patient has a history of cerebral palsy and generalized convulsive epilepsy  He takes multiple  Antiepileptic medications  He follows with epileptology  Limited ROS obtained from the patient given his history of cognitive impairment  Most history is obtained from caregiver at bedside  Patient appears well in no distress  Patient denies any complaints  Allergies reviewed          Prior to Admission Medications   Prescriptions Last Dose Informant Patient Reported? Taking?    Blood Glucose Monitoring Suppl (ONETOUCH VERIO) w/Device KIT  Care Giver No No   Sig: by Does not apply route 3 (three) times a day TEST BLOOD SUGARS THREE TIMES   Patient not taking: Reported on 11/10/2020   Depakote  MG 24 hr tablet  Care Giver No No   Sig: TAKE 1 TABLET BY MOUTH IN THE MORNING AND TAKE 2 TABLETS BY MOUTH AT BEDTIME  *BRAND NECESSARY*   Humidifiers (Cool Mist Humidifier 1 gallon) MISC   No No   Sig: Use daily at bedtime   Insulin Pen Needle (PEN NEEDLES) 31G X 5 MM MISC  Care Giver No No   Sig: by Does not apply route daily   Patient not taking: Reported on 11/5/2020   Multiple Vitamin (Daily-Reina) TABS   No No   Sig: TAKE 1 TABLET BY MOUTH DAILY (8AM)   OneTouch Delica Lancets 47C MISC  Care Giver No No   Sig: by Does not apply route daily TEST BLOOD SUGARS THREE TIMES DAILY   Patient not taking: Reported on 11/5/2020   QUEtiapine (SEROquel XR) 200 mg 24 hr tablet  Care Giver No No   Sig: TAKE 1 TABLET BY MOUTH TWICE A DAY (8AM,2PM)   QUEtiapine (SEROquel XR) 400 mg 24 hr tablet Care Giver No No   Sig: TAKE 1 TABLET BY MOUTH AT BEDTIME (8PM) (DX: ANTIPSYCHOTIC)   Stool Softener 100 MG capsule  Care Giver No No   Sig: TAKE 1 CAPSULE BY MOUTH TWICE A DAY (8AM,8PM) (DX: CONSTIPATION)   benzonatate (TESSALON) 200 MG capsule   No No   Sig: Take 1 capsule (200 mg total) by mouth 3 (three) times a day as needed for cough   benztropine (COGENTIN) 0 5 mg tablet  Care Giver No No   Sig: TAKE 1 TABLET BY MOUTH TWICE A DAY (8AM,8PM)   calcium carbonate (OYSTER SHELL,OSCAL) 500 mg   No No   Sig: TAKE 1 TABLET BY MOUTH 3 TIMES A DAY (8AM,2PM,8PM)   cholecalciferol (VITAMIN D3) 1,000 units tablet  Care Giver No No   Sig: TAKE 1 TABLET BY MOUTH DAILY (8AM) (DX: VITAMIN DAILY DEFICIENCY)   gabapentin (NEURONTIN) 100 mg capsule  Care Giver Yes No   Sig: Take 100 mg by mouth 3 (three) times a day    glucose blood (OneTouch Verio) test strip  Care Giver No No   Sig: TEST BLOOD SUGARS THREE TIMES DAILY   Patient not taking: Reported on 11/10/2020   hydrOXYzine pamoate (VISTARIL) 50 mg capsule  Care Giver No No   Sig: TAKE 1 CAPSULE BY MOUTH 3 TIMES A DAY   Patient not taking: Reported on 11/5/2020   ibuprofen (MOTRIN) 600 mg tablet  Care Giver No No   Sig: Take 1 tablet (600 mg total) by mouth every 8 (eight) hours   ketoconazole (NIZORAL) 2 % shampoo  Care Giver No No   Sig: Wash hair and sideburns at least 3 times per week   lacosamide (VIMPAT) 200 mg tablet   No No   Sig: Take 1 tablet (200 mg total) by mouth every 12 (twelve) hours   levothyroxine 125 mcg tablet   No No   Sig: TAKE 1 TABLET BY MOUTH DAILY (8AM) (DX: OTHER SPECIFIED HYPOTHYROIDISM)   lisinopril (ZESTRIL) 10 mg tablet  Care Giver No No   Sig: TAKE 1 TABLET BY MOUTH DAILY (8AM) (DX: HYPERTENSION)   loratadine (CLARITIN) 10 mg tablet  Care Giver No No   Sig: One tab daily as needed for allergy symptoms   metFORMIN (GLUCOPHAGE) 1000 MG tablet  Care Giver No No   Sig: TAKE 1 TABLET BY MOUTH TWICE A DAY WITH MEALS (8AM, 8PM) (DX: DIABETES MELLITUS)   polyethylene glycol (MIRALAX) 17 g packet  Care Giver No No   Sig: Take 17 g by mouth daily   Patient not taking: Reported on 2020   simvastatin (ZOCOR) 40 mg tablet   No No   Sig: TAKE 1 TABLET BY MOUTH DAILY (DX: HYPERLIPIDEMIA)   sodium chloride (OCEAN) 0 65 % nasal spray   No No   Si spray into each nostril as needed for congestion   temazepam (RESTORIL) 7 5 mg capsule Past Week at Unknown time Care Giver Yes Yes   Sig: Take 15 mg by mouth daily at bedtime as needed for sleep   triamcinolone (KENALOG) 0 1 % cream  Care Giver No No   Sig: Apply topically 2 (two) times a day   Patient not taking: Reported on 2020   vitamin B-12 (VITAMIN B-12) 1,000 mcg tablet  Care Giver No No   Sig: TAKE 1 TABLET BY MOUTH EVERY OTHER DAY (8AM)      Facility-Administered Medications: None       Past Medical History:   Diagnosis Date    ADRIANA (acute kidney injury) ([facility] 77 ) 2019    [facility] ([facility])     Depression     Diabetes ([place] [address] )     Disease of thyroid gland     Gait disorder     Hyperlipidemia     Hypertension     Kidney failure     Kidney stones     Osteoporosis     Psychiatric disorder     Scoliosis     Seizures ([place] [address] )     Thyroid disease     UTI (urinary tract infection)        Past Surgical History:   Procedure Laterality Date    APPENDECTOMY         History reviewed  No pertinent family history  I have reviewed and agree with the history as documented  E-Cigarette/Vaping    E-Cigarette Use Never User      E-Cigarette/Vaping Substances     Social History     Tobacco Use    Smoking status: Never Smoker    Smokeless tobacco: Never Used   Substance Use Topics    Alcohol use: Never     Frequency: Never    Drug use: No       Review of Systems   Unable to perform ROS: Psychiatric disorder       Physical Exam  Physical Exam  Vitals signs and nursing note reviewed  Constitutional:       General: He is not in acute distress       Appearance: He is well-developed, well-groomed and underweight  He is not ill-appearing  HENT:      Head: Normocephalic and atraumatic  Right Ear: External ear normal       Left Ear: External ear normal       Nose: Nose normal    Eyes:      Pupils: Pupils are equal, round, and reactive to light  Neck:      Musculoskeletal: Normal range of motion and neck supple  Cardiovascular:      Rate and Rhythm: Normal rate and regular rhythm  Heart sounds: Normal heart sounds  No murmur  No friction rub  No gallop  Pulmonary:      Effort: Pulmonary effort is normal  No respiratory distress  Breath sounds: Normal breath sounds  No stridor  No wheezing or rales  Abdominal:      General: Bowel sounds are normal  There is no distension  Palpations: Abdomen is soft  Tenderness: There is no abdominal tenderness  There is no guarding  Musculoskeletal: Normal range of motion  General: No tenderness  Skin:     General: Skin is warm  Capillary Refill: Capillary refill takes less than 2 seconds  Neurological:      General: No focal deficit present  Mental Status: He is alert and oriented to person, place, and time  Comments:   Grossly neurologically intact  Occasional jerking motions of the arms are noted  Psychiatric:         Behavior: Behavior is cooperative           Vital Signs  ED Triage Vitals [01/09/21 1846]   Temperature Pulse Respirations Blood Pressure SpO2   (!) 97 1 °F (36 2 °C) 98 18 (!) 172/86 95 %      Temp Source Heart Rate Source Patient Position - Orthostatic VS BP Location FiO2 (%)   Temporal Monitor -- Right arm --      Pain Score       --           Vitals:    01/09/21 1846 01/09/21 2030   BP: (!) 172/86 150/77   Pulse: 98 85         Visual Acuity      ED Medications  Medications - No data to display    Diagnostic Studies  Results Reviewed     Procedure Component Value Units Date/Time    Comprehensive metabolic panel [[id number]]  (Abnormal) Collected: 01/09/21 2015    Lab Status: Final result Specimen: Blood from Arm, Right Updated: 01/09/21 2046     Sodium 137 mmol/L      Potassium 3 7 mmol/L      Chloride 103 mmol/L      CO2 29 mmol/L      ANION GAP 5 mmol/L      BUN 28 mg/dL      Creatinine 0 73 mg/dL      Glucose 135 mg/dL      Calcium 9 6 mg/dL      AST 14 U/L      ALT 12 U/L      Alkaline Phosphatase 39 U/L      Total Protein 6 8 g/dL      Albumin 4 4 g/dL      Total Bilirubin 0 10 mg/dL      eGFR 106 ml/min/1 73sq m     Narrative:      [place] guidelines for Chronic Kidney Disease (CKD):     Stage 1 with normal or high GFR (GFR > 90 mL/min/1 73 square meters)    Stage 2 Mild CKD (GFR = 60-89 mL/min/1 73 square meters)    Stage 3A Moderate CKD (GFR = 45-59 mL/min/1 73 square meters)    Stage 3B Moderate CKD (GFR = 30-44 mL/min/1 73 square meters)    Stage 4 Severe CKD (GFR = 15-29 mL/min/1 73 square meters)    Stage 5 End Stage CKD (GFR <15 mL/min/1 73 square meters)  Note: GFR calculation is accurate only with a steady state creatinine    Valproic acid level, total [[id number]]  (Normal) Collected: 01/09/21 2015    Lab Status: Final result Specimen: Blood from Arm, Right Updated: 01/09/21 2039     Valproic Acid, Total 88 2 ug/mL     CBC and differential [[id number]]  (Abnormal) Collected: 01/09/21 2015    Lab Status: Final result Specimen: Blood from Arm, Right Updated: 01/09/21 2024     WBC 5 70 Thousand/uL      RBC 4 14 Million/uL      Hemoglobin 13 1 g/dL      Hematocrit 39 7 %      MCV 96 fL      MCH 31 7 pg      MCHC 33 0 g/dL      RDW 13 8 %      MPV 10 6 fL      Platelets 060 Thousands/uL      Neutrophils Relative 36 %      Lymphocytes Relative 48 %      Monocytes Relative 12 %      Eosinophils Relative 3 %      Basophils Relative 1 %      Neutrophils Absolute 2 10 Thousands/µL      Lymphocytes Absolute 2 80 Thousands/µL      Monocytes Absolute 0 70 Thousand/µL      Eosinophils Absolute 0 20 Thousand/µL      Basophils Absolute 0 00 Thousands/µL Lacosamide [[id number]] Collected: 01/09/21 2015    Lab Status: In process Specimen: Blood from Arm, Right Updated: 01/09/21 2018    Levetiracetam level [[id number]] Collected: 01/09/21 2015    Lab Status: In process Specimen: Blood from Arm, Right Updated: 01/09/21 2017                 No orders to display              Procedures  Procedures         ED Course                                           MDM  Number of Diagnoses or Management Options  Involuntary movements:   Diagnosis management comments:    Case discussed with epileptologist Dr Papi March  Well check levels of anticonvulsants  Increase dose of Keppra to 2 x 750mg tablet BID  Will follow with patient  Patient's caregiver was notified of the medication changes  Update prescription written  Recommend close follow-up with his epileptologist   Care provider verbalized understanding and is in agreement with the treatment plan  No questions at the time of discharge  Amount and/or Complexity of Data Reviewed  Clinical lab tests: ordered and reviewed    Risk of Complications, Morbidity, and/or Mortality  Presenting problems: moderate  Diagnostic procedures: low  Management options: low    Patient Progress  Patient progress: stable      Disposition  Final diagnoses:   Involuntary movements     Time reflects when diagnosis was documented in both MDM as applicable and the Disposition within this note     Time User Action Codes Description Comment    1/9/2021  8:30 PM Nicolas Speaks Add [R25 9] Involuntary movements       ED Disposition     ED Disposition Condition Date/Time Comment    Discharge Stable Sat Jan 9, 2021  8:36 PM Moisés Oconnell discharge to home/self care              Follow-up Information     Follow up With Specialties Details Why Contact Info    Daily Matute MD Neurology   [address]  [address] [facility]  [phone]            Discharge Medication List as of 1/9/2021  8:36 PM      START taking these medications    Details levETIRAcetam (KEPPRA) 750 mg tablet Take 2 tablets (1,500 mg total) by mouth 2 (two) times a day for 14 days, Starting Sat 1/9/2021, Until Sat 1/23/2021, Normal         CONTINUE these medications which have NOT CHANGED    Details   benzonatate (TESSALON) 200 MG capsule Take 1 capsule (200 mg total) by mouth 3 (three) times a day as needed for cough, Starting Tue 11/10/2020, Normal      benztropine (COGENTIN) 0 5 mg tablet TAKE 1 TABLET BY MOUTH TWICE A DAY (8AM,8PM), Normal      Blood Glucose Monitoring Suppl (ONETOUCH VERIO) w/Device KIT by Does not apply route 3 (three) times a day TEST BLOOD SUGARS THREE TIMES, Starting Fri 7/31/2020, Normal      calcium carbonate (OYSTER SHELL,OSCAL) 500 mg TAKE 1 TABLET BY MOUTH 3 TIMES A DAY (8AM,2PM,8PM), Normal      cholecalciferol (VITAMIN D3) 1,000 units tablet TAKE 1 TABLET BY MOUTH DAILY (8AM) (DX: VITAMIN DAILY DEFICIENCY), Normal      Depakote  MG 24 hr tablet TAKE 1 TABLET BY MOUTH IN THE MORNING AND TAKE 2 TABLETS BY MOUTH AT BEDTIME  *BRAND NECESSARY*, Normal      gabapentin (NEURONTIN) 100 mg capsule Take 100 mg by mouth 3 (three) times a day , Starting Fri 6/26/2020, Historical Med      glucose blood (OneTouch Verio) test strip TEST BLOOD SUGARS THREE TIMES DAILY, Normal      Humidifiers (Cool Mist Humidifier 1 gallon) MISC Use daily at bedtime, Starting Tue 11/10/2020, Until Mon 2/8/2021, Normal      hydrOXYzine pamoate (VISTARIL) 50 mg capsule TAKE 1 CAPSULE BY MOUTH 3 TIMES A DAY, Normal      ibuprofen (MOTRIN) 600 mg tablet Take 1 tablet (600 mg total) by mouth every 8 (eight) hours, Starting Fri 7/31/2020, Normal      Insulin Pen Needle (PEN NEEDLES) 31G X 5 MM MISC by Does not apply route daily, Starting Mon 7/13/2020, Normal      ketoconazole (NIZORAL) 2 % shampoo Wash hair and sideburns at least 3 times per week, Normal      lacosamide (VIMPAT) 200 mg tablet Take 1 tablet (200 mg total) by mouth every 12 (twelve) hours, Starting Thu 11/5/2020, Normal      levothyroxine 125 mcg tablet TAKE 1 TABLET BY MOUTH DAILY (8AM) (DX: OTHER SPECIFIED HYPOTHYROIDISM), Normal      lisinopril (ZESTRIL) 10 mg tablet TAKE 1 TABLET BY MOUTH DAILY (8AM) (DX: HYPERTENSION), Normal      loratadine (CLARITIN) 10 mg tablet One tab daily as needed for allergy symptoms, Normal      metFORMIN (GLUCOPHAGE) 1000 MG tablet TAKE 1 TABLET BY MOUTH TWICE A DAY WITH MEALS (8AM, 8PM) (DX: DIABETES MELLITUS), Normal      Multiple Vitamin (Daily-Reina) TABS TAKE 1 TABLET BY MOUTH DAILY (8AM), Normal      OneTouch Delica Lancets 92P MISC by Does not apply route daily TEST BLOOD SUGARS THREE TIMES DAILY, Starting Fri 7/31/2020, Normal      polyethylene glycol (MIRALAX) 17 g packet Take 17 g by mouth daily, Starting Thu 6/18/2020, Normal      !! QUEtiapine (SEROquel XR) 200 mg 24 hr tablet TAKE 1 TABLET BY MOUTH TWICE A DAY (8AM,2PM), Normal      !! QUEtiapine (SEROquel XR) 400 mg 24 hr tablet TAKE 1 TABLET BY MOUTH AT BEDTIME (8PM) (DX: ANTIPSYCHOTIC), Normal      simvastatin (ZOCOR) 40 mg tablet TAKE 1 TABLET BY MOUTH DAILY (DX: HYPERLIPIDEMIA), Normal      sodium chloride (OCEAN) 0 65 % nasal spray 1 spray into each nostril as needed for congestion, Starting Tue 11/10/2020, Until Thu 12/10/2020, Normal      Stool Softener 100 MG capsule TAKE 1 CAPSULE BY MOUTH TWICE A DAY (8AM,8PM) (DX: CONSTIPATION), Normal      temazepam (RESTORIL) 7 5 mg capsule Take 15 mg by mouth daily at bedtime as needed for sleep, Historical Med      triamcinolone (KENALOG) 0 1 % cream Apply topically 2 (two) times a day, Starting Mon 7/13/2020, Normal      vitamin B-12 (VITAMIN B-12) 1,000 mcg tablet TAKE 1 TABLET BY MOUTH EVERY OTHER DAY (8AM), Normal       !! - Potential duplicate medications found  Please discuss with provider  No discharge procedures on file      PDMP Review       Value Time User    PDMP Reviewed  Yes 11/5/2020  1:11 PM Bailee Hall          ED Provider  Electronically Signed by           Tamy Ford PA-C  01/10/21 1200

## 2021-01-10 NOTE — ED ATTENDING ATTESTATION
1/9/2021  IGonzalez MD, saw and evaluated the patient  I have discussed the patient with the resident/non-physician practitioner and agree with the resident's/non-physician practitioner's findings, Plan of Care, and MDM as documented in the resident's/non-physician practitioner's note, except where noted  All available labs and Radiology studies were reviewed  I was present for key portions of any procedure(s) performed by the resident/non-physician practitioner and I was immediately available to provide assistance  At this point I agree with the current assessment done in the Emergency Department

## 2021-01-10 NOTE — DISCHARGE INSTRUCTIONS
Dose change of keppra: Take 2 tablets (1500mg total) of Keppra, 2 times per day  Please make a follow up appointment with Dr Crystal Okeefe

## 2021-01-12 ENCOUNTER — TELEPHONE (OUTPATIENT)
Dept: NEUROLOGY | Facility: CLINIC | Age: 56
End: 2021-01-12

## 2021-01-12 NOTE — TELEPHONE ENCOUNTER
Called patient to remind of appt with Dr Nani Freeman on Tuesday, 1/26 at 8:30 AM, via telephone  Voicemail left to advise patient to call back with any urgent symptoms or issues  Patient also made aware that we will be calling back two days prior to appointment to complete the COVID screening

## 2021-01-12 NOTE — TELEPHONE ENCOUNTER
Patient returned call to office  Patient was not happy with his appointment being at 08:30 AM  He asked if the appointment can be moved further back as he needs to "shower, eat breakfast, and take his medications" around this time of the morning  I informed him that the schedule was full and I was unable to move his appointment around  I informed him the only other option would be to reschedule, which he did not want to do  Patient will keep his 08:30 AM telephone visit with Dr Montrell Gant

## 2021-01-14 LAB — LACOSAMIDE SERPL-MCNC: 4.3 UG/ML (ref 5–10)

## 2021-01-15 LAB — LEVETIRACETAM SERPL-MCNC: 7.2 UG/ML (ref 10–40)

## 2021-01-23 DIAGNOSIS — E55.9 VITAMIN D DEFICIENCY: ICD-10-CM

## 2021-01-23 DIAGNOSIS — G23.2 PARKINSON'S PLUS SYNDROME (HCC): ICD-10-CM

## 2021-01-25 RX ORDER — CHOLECALCIFEROL (VITAMIN D3) 25 MCG
TABLET ORAL
Qty: 28 TABLET | Refills: 4 | Status: SHIPPED | OUTPATIENT
Start: 2021-01-25 | End: 2021-07-15

## 2021-01-25 RX ORDER — BENZTROPINE MESYLATE 0.5 MG/1
TABLET ORAL
Qty: 56 TABLET | Refills: 4 | Status: SHIPPED | OUTPATIENT
Start: 2021-01-25 | End: 2021-07-15

## 2021-01-26 ENCOUNTER — TELEMEDICINE (OUTPATIENT)
Dept: NEUROLOGY | Facility: CLINIC | Age: 56
End: 2021-01-26
Payer: MEDICARE

## 2021-01-26 DIAGNOSIS — G40.309 GENERALIZED CONVULSIVE EPILEPSY (HCC): Primary | Chronic | ICD-10-CM

## 2021-01-26 DIAGNOSIS — R25.9 INVOLUNTARY MOVEMENTS: ICD-10-CM

## 2021-01-26 PROCEDURE — 99442 PR PHYS/QHP TELEPHONE EVALUATION 11-20 MIN: CPT | Performed by: PSYCHIATRY & NEUROLOGY

## 2021-01-26 RX ORDER — LEVETIRACETAM 750 MG/1
1500 TABLET ORAL 2 TIMES DAILY
Qty: 120 TABLET | Refills: 11 | Status: SHIPPED | OUTPATIENT
Start: 2021-01-26 | End: 2021-12-03

## 2021-01-26 NOTE — PROGRESS NOTES
Virtual Regular Visit      Assessment/Plan:    Patient ID: Siva Wylie is a 50 y o  male with cerebral palsy, generalized epilepsy, and behavioral disorder, who is returning for follow-up of his seizures  Assessment/Plan:    Generalized convulsive epilepsy ([address] )   He did not have any larger seizures since his last appointment, but did have an episode of a very frequent myoclonic jerking which occurred in the beginning of January  There is no clear provocative cause to this worsening of his myoclonus, but he has responded well to the increased dose of levetiracetam, with resolution of his myoclonic jerking  At this point, I would not recommend any further adjustments in his medications since this was just recently increased, and will be important for us to assess how he does on this higher dose  -- he will continue Depakote ER, levetiracetam, and lacosamide unchanged  If he would have any additional seizures, we still could potentially increase his levetiracetam a little bit further  He will Return in about 3 months (around 4/26/2021)  Reason for visit is   Chief Complaint   Patient presents with    Seizures     Possible seizure in November 2020  Caregiver noticed some jerking   Virtual Regular Visit        Encounter provider Joe Montaño MD    Provider located at [address] [place] [address] [address] [facility]  [phone]      Recent Visits  No visits were found meeting these conditions  Showing recent visits within past 7 days and meeting all other requirements     Today's Visits  Date Type Provider Dept   01/26/21 Telemedicine Joe Montaño MD  Neuro [facility] today's visits and meeting all other requirements     Future Appointments  No visits were found meeting these conditions     Showing future appointments within next 150 days and meeting all other requirements        It was my intent to perform this visit via video technology but the patient was not able to do a video connection so the visit was completed via audio telephone only  After connecting through telephone, the patient was identified by name and date of birth  Misti Oconnell's caregiver Yoanna Bledsoe, caregiver) was informed that this is a telemedicine visit and that the visit is being conducted through telephone  My office door was closed  No one else was in the room  The caregiver acknowledged consent and understanding of privacy and security of the platform  The patient's caregiver has agreed to participate and understands they can discontinue the visit at any time  Patient's caregiver is aware this is a billable service  Santi Aldana was not able to consent to the above due to intellectual disability    Subjective  Santi Aldana is a 50 y o  male     Current seizure medications:  1  Depakote ER 500mg tablets (BRAND NECESSARY)- 1 tablet in the morning and 2 tablets at night  2  Levetiracetam 750mg tablets- 2 tablets twice per day  3  Lacosamide 200mg tablets- 1 tablet twice per day  Other medications as per Epic  Since his last visit, he has not had any other larger seizures  He had a breakthrough seizure back in November after that was seen in the office his Lacosamide was increased  On 1/9/2021, he was seen in the ED because of a fall and increased myoclonic jerking  He was supposed to go home to visit family, but he unfortunately went down the stairs himself and had fallen  He was taken to the ED and because he was having increased myoclonic jerking and his Levetiracetam was increased to 1500 mg twice a day  With that increase about 2 weeks ago, he has been doing a little better  Prior Medications: zonisamide    His history was also obtained from his caregiver, Luisana Mclaughlin, who was also present via telephone       I reviewed recent notes including notes from his emergency department visit, [facility], and summarized above       The following portions of the patient's history were reviewed and updated as appropriate: allergies, current medications, past medical history and problem list         HPI     Past Medical History:   Diagnosis Date    ADRIANA (acute kidney injury) ([place] [address] ) 9/24/2019    CP (cerebral palsy) ([place] [address] )     Depression     Diabetes ([place] [address] )     Disease of thyroid gland     Gait disorder     Hyperlipidemia     Hypertension     Kidney failure     Kidney stones     Osteoporosis     Psychiatric disorder     Scoliosis     Seizures ([place] [address] )     Thyroid disease     UTI (urinary tract infection)        Past Surgical History:   Procedure Laterality Date    APPENDECTOMY         Current Outpatient Medications   Medication Sig Dispense Refill    benzonatate (TESSALON) 200 MG capsule Take 1 capsule (200 mg total) by mouth 3 (three) times a day as needed for cough 20 capsule 0    benztropine (COGENTIN) 0 5 mg tablet TAKE 1 TABLET BY MOUTH TWICE A DAY (8AM,8PM) 56 tablet 4    calcium carbonate (OYSTER SHELL,OSCAL) 500 mg TAKE 1 TABLET BY MOUTH 3 TIMES A DAY (8AM,2PM,8PM) 84 each 4    Cholecalciferol (Vitamin D3) 25 MCG TABS TAKE 1 TABLET BY MOUTH DAILY (8AM) (DX: VITAMIN DAILY DEFICIENCY) 28 tablet 4    Depakote  MG 24 hr tablet TAKE 1 TABLET BY MOUTH IN THE MORNING AND TAKE 2 TABLETS BY MOUTH AT BEDTIME  *BRAND NECESSARY* 90 tablet 5    gabapentin (NEURONTIN) 100 mg capsule Take 100 mg by mouth 3 (three) times a day       Humidifiers (Cool Mist Humidifier 1 gallon) MISC Use daily at bedtime 1 each 0    ibuprofen (MOTRIN) 600 mg tablet Take 1 tablet (600 mg total) by mouth every 8 (eight) hours 60 tablet 0    ketoconazole (NIZORAL) 2 % shampoo Wash hair and sideburns at least 3 times per week 120 mL 3    lacosamide (VIMPAT) 200 mg tablet Take 1 tablet (200 mg total) by mouth every 12 (twelve) hours 180 tablet 3    levETIRAcetam (KEPPRA) 750 mg tablet Take 2 tablets (1,500 mg total) by mouth 2 (two) times a day 120 tablet 11    levothyroxine 125 mcg tablet TAKE 1 TABLET BY MOUTH DAILY (8AM) (DX: OTHER SPECIFIED HYPOTHYROIDISM) 28 tablet 4    lisinopril (ZESTRIL) 10 mg tablet TAKE 1 TABLET BY MOUTH DAILY (8AM) (DX: HYPERTENSION) 28 tablet 4    loratadine (CLARITIN) 10 mg tablet One tab daily as needed for allergy symptoms 90 tablet 0    metFORMIN (GLUCOPHAGE) 1000 MG tablet TAKE 1 TABLET BY MOUTH TWICE A DAY WITH MEALS (8AM, 8PM) (DX: DIABETES MELLITUS) 56 tablet 4    Multiple Vitamin (Daily-Reina) TABS TAKE 1 TABLET BY MOUTH DAILY (8AM) 28 tablet 4    QUEtiapine (SEROquel XR) 200 mg 24 hr tablet TAKE 1 TABLET BY MOUTH TWICE A DAY (8AM,2PM) 16 tablet 10    QUEtiapine (SEROquel XR) 400 mg 24 hr tablet TAKE 1 TABLET BY MOUTH AT BEDTIME (8PM) (DX: ANTIPSYCHOTIC) 28 tablet 10    simvastatin (ZOCOR) 40 mg tablet TAKE 1 TABLET BY MOUTH DAILY (DX: HYPERLIPIDEMIA) 28 tablet 4    sodium chloride (OCEAN) 0 65 % nasal spray 1 spray into each nostril as needed for congestion 15 mL 1    Stool Softener 100 MG capsule TAKE 1 CAPSULE BY MOUTH TWICE A DAY (8AM,8PM) (DX: CONSTIPATION) 56 capsule 4    temazepam (RESTORIL) 7 5 mg capsule Take 15 mg by mouth daily at bedtime as needed for sleep      vitamin B-12 (VITAMIN B-12) 1,000 mcg tablet TAKE 1 TABLET BY MOUTH EVERY OTHER DAY (8AM) 14 tablet 10    Blood Glucose Monitoring Suppl (ONETOUCH VERIO) w/Device KIT by Does not apply route 3 (three) times a day TEST BLOOD SUGARS THREE TIMES (Patient not taking: Reported on 11/10/2020) 1 kit 0    glucose blood (OneTouch Verio) test strip TEST BLOOD SUGARS THREE TIMES DAILY (Patient not taking: Reported on 11/10/2020) 100 each 5    hydrOXYzine pamoate (VISTARIL) 50 mg capsule TAKE 1 CAPSULE BY MOUTH 3 TIMES A DAY (Patient not taking: Reported on 11/5/2020) 84 capsule 10    Insulin Pen Needle (PEN NEEDLES) 31G X 5 MM MISC by Does not apply route daily (Patient not taking: Reported on 11/5/2020) 100 each 5    OneTouch Delica Lancets 06X MISC by Does not apply route daily TEST BLOOD SUGARS THREE TIMES DAILY (Patient not taking: Reported on 11/5/2020) 100 each 2    polyethylene glycol (MIRALAX) 17 g packet Take 17 g by mouth daily (Patient not taking: Reported on 11/5/2020) 14 each 8    triamcinolone (KENALOG) 0 1 % cream Apply topically 2 (two) times a day (Patient not taking: Reported on 11/5/2020) 30 g 0     No current facility-administered medications for this visit  Allergies   Allergen Reactions    Erythromycin Other (See Comments)     Unknown per pt    Penicillins Other (See Comments)     Unknown per pt       Review of Systems   Constitutional: Negative  Negative for appetite change and fever  HENT: Negative for dental problem, hearing loss, tinnitus, trouble swallowing and voice change  Eyes: Negative  Negative for photophobia and pain  Respiratory: Negative  Negative for shortness of breath  Cardiovascular: Negative  Negative for palpitations  Gastrointestinal: Negative  Negative for nausea and vomiting  Endocrine: Negative  Negative for cold intolerance  Genitourinary: Negative  Negative for dysuria, frequency and urgency  Musculoskeletal: Negative  Negative for myalgias and neck pain  Falls   Skin: Negative  Negative for rash  Neurological: Negative  Negative for dizziness, tremors, seizures, syncope, facial asymmetry, speech difficulty, weakness, light-headedness, numbness and headaches  Jerking   Hematological: Negative  Does not bruise/bleed easily  Psychiatric/Behavioral: Negative  Negative for confusion, hallucinations and sleep disturbance  Video Exam    There were no vitals filed for this visit  Physical Exam     I spent 15 minutes directly with the patient during this visit      Chinedu Alok acknowledges that he has consented to an online visit or consultation   He understands that the online visit is based solely on information provided by him, and that, in the absence of a face-to-face physical evaluation by the physician, the diagnosis he receives is both limited and provisional in terms of accuracy and completeness  This is not intended to replace a full medical face-to-face evaluation by the physician  Gabriel Balderrama understands and accepts these terms

## 2021-01-26 NOTE — ASSESSMENT & PLAN NOTE
He did not have any larger seizures since his last appointment, but did have an episode of a very frequent myoclonic jerking which occurred in the beginning of January  There is no clear provocative cause to this worsening of his myoclonus, but he has responded well to the increased dose of levetiracetam, with resolution of his myoclonic jerking  At this point, I would not recommend any further adjustments in his medications since this was just recently increased, and will be important for us to assess how he does on this higher dose  -- he will continue Depakote ER, levetiracetam, and lacosamide unchanged  If he would have any additional seizures, we still could potentially increase his levetiracetam a little bit further

## 2021-01-26 NOTE — PATIENT INSTRUCTIONS
-- Continue to give his seizure medications unchanged     -- it will be important to give more time to see how he does on the higher doses of his Levetiracetam

## 2021-02-17 ENCOUNTER — OFFICE VISIT (OUTPATIENT)
Dept: FAMILY MEDICINE CLINIC | Facility: HOME HEALTHCARE | Age: 56
End: 2021-02-17
Payer: MEDICARE

## 2021-02-17 VITALS
WEIGHT: 160 LBS | SYSTOLIC BLOOD PRESSURE: 110 MMHG | HEART RATE: 100 BPM | TEMPERATURE: 98.9 F | BODY MASS INDEX: 21.67 KG/M2 | HEIGHT: 72 IN | DIASTOLIC BLOOD PRESSURE: 70 MMHG | RESPIRATION RATE: 18 BRPM

## 2021-02-17 DIAGNOSIS — Z11.1 SCREENING-PULMONARY TB: ICD-10-CM

## 2021-02-17 DIAGNOSIS — Z13.89 SCREENING FOR DIABETIC PERIPHERAL NEUROPATHY: ICD-10-CM

## 2021-02-17 DIAGNOSIS — E78.5 HYPERLIPIDEMIA, UNSPECIFIED HYPERLIPIDEMIA TYPE: ICD-10-CM

## 2021-02-17 DIAGNOSIS — E03.9 ACQUIRED HYPOTHYROIDISM: ICD-10-CM

## 2021-02-17 DIAGNOSIS — Z12.5 PROSTATE CANCER SCREENING: ICD-10-CM

## 2021-02-17 DIAGNOSIS — Z12.11 SCREENING FOR COLON CANCER: ICD-10-CM

## 2021-02-17 DIAGNOSIS — Z00.00 MEDICARE ANNUAL WELLNESS VISIT, SUBSEQUENT: Primary | ICD-10-CM

## 2021-02-17 DIAGNOSIS — Z01.00 ENCOUNTER FOR VISION SCREENING: ICD-10-CM

## 2021-02-17 PROCEDURE — G0439 PPPS, SUBSEQ VISIT: HCPCS | Performed by: FAMILY MEDICINE

## 2021-02-17 RX ORDER — TEMAZEPAM 15 MG/1
15 CAPSULE ORAL
COMMUNITY
Start: 2021-02-08 | End: 2021-11-03 | Stop reason: SDUPTHER

## 2021-02-17 NOTE — PROGRESS NOTES
Patient's shoes and socks removed  Right Foot/Ankle   Right Foot Inspection  Skin Exam: skin normal and skin intact no dry skin, no warmth, no callus, no erythema, no maceration, no abnormal color, no pre-ulcer, no ulcer and no callus                            Sensory       Monofilament testing: intact      Left Foot/Ankle  Left Foot Inspection  Skin Exam: skin normal and skin intactno dry skin, no warmth, no erythema, no maceration, normal color, no pre-ulcer, no ulcer and no callus                                         Sensory       Monofilament: intact    Assign Risk Category:  No deformity present; No loss of protective sensation;  No weak pulses       Risk: 0

## 2021-02-17 NOTE — PATIENT INSTRUCTIONS
Medicare Preventive Visit Patient Instructions  Thank you for completing your Welcome to Medicare Visit or Medicare Annual Wellness Visit today  Your next wellness visit will be due in one year (2/17/2022)  The screening/preventive services that you may require over the next 5-10 years are detailed below  Some tests may not apply to you based off risk factors and/or age  Screening tests ordered at today's visit but not completed yet may show as past due  Also, please note that scanned in results may not display below  Preventive Screenings:  Service Recommendations Previous Testing/Comments   Colorectal Cancer Screening  · Colonoscopy    · Fecal Occult Blood Test (FOBT)/Fecal Immunochemical Test (FIT)  · Fecal DNA/Cologuard Test  · Flexible Sigmoidoscopy Age: 54-65 years old   Colonoscopy: every 10 years (May be performed more frequently if at higher risk)  OR  FOBT/FIT: every 1 year  OR  Cologuard: every 3 years  OR  Sigmoidoscopy: every 5 years  Screening may be recommended earlier than age 48 if at higher risk for colorectal cancer  Also, an individualized decision between you and your healthcare provider will decide whether screening between the ages of 74-80 would be appropriate  Colonoscopy: Not on file  FOBT/FIT: Not on file  Cologuard: Not on file  Sigmoidoscopy: Not on file         Prostate Cancer Screening Individualized decision between patient and health care provider in men between ages of 53-78   Medicare will cover every 12 months beginning on the day after your 50th birthday PSA: 1 5 ng/mL          Hepatitis C Screening Once for adults born between 1945 and 1965  More frequently in patients at high risk for Hepatitis C Hep C Antibody: Not on file       Diabetes Screening 1-2 times per year if you're at risk for diabetes or have pre-diabetes Fasting glucose: 99 mg/dL   A1C: 5 4       Cholesterol Screening Once every 5 years if you don't have a lipid disorder   May order more often based on risk factors  Lipid panel: 07/31/2019          Other Preventive Screenings Covered by Medicare:  1  Abdominal Aortic Aneurysm (AAA) Screening: covered once if your at risk  You're considered to be at risk if you have a family history of AAA or a male between the age of 73-68 who smoking at least 100 cigarettes in your lifetime  2  Lung Cancer Screening: covers low dose CT scan once per year if you meet all of the following conditions: (1) Age 50-69; (2) No signs or symptoms of lung cancer; (3) Current smoker or have quit smoking within the last 15 years; (4) You have a tobacco smoking history of at least 30 pack years (packs per day x number of years you smoked); (5) You get a written order from a healthcare provider  3  Glaucoma Screening: covered annually if you're considered high risk: (1) You have diabetes OR (2) Family history of glaucoma OR (3)  aged 48 and older OR (3)  American aged 72 and older  3  Osteoporosis Screening: covered every 2 years if you meet one of the following conditions: (1) Have a vertebral abnormality; (2) On glucocorticoid therapy for more than 3 months; (3) Have primary hyperparathyroidism; (4) On osteoporosis medications and need to assess response to drug therapy  5  HIV Screening: covered annually if you're between the age of 12-76  Also covered annually if you are younger than 13 and older than 72 with risk factors for HIV infection  For pregnant patients, it is covered up to 3 times per pregnancy  Immunizations:  Immunization Recommendations   Influenza Vaccine Annual influenza vaccination during flu season is recommended for all persons aged >= 6 months who do not have contraindications   Pneumococcal Vaccine (Prevnar and Pneumovax)  * Prevnar = PCV13  * Pneumovax = PPSV23 Adults 25-60 years old: 1-3 doses may be recommended based on certain risk factors  Adults 72 years old: Prevnar (PCV13) vaccine recommended followed by Pneumovax (PPSV23) vaccine   If already received PPSV23 since turning 65, then PCV13 recommended at least one year after PPSV23 dose  Hepatitis B Vaccine 3 dose series if at intermediate or high risk (ex: diabetes, end stage renal disease, liver disease)   Tetanus (Td) Vaccine - COST NOT COVERED BY MEDICARE PART B Following completion of primary series, a booster dose should be given every 10 years to maintain immunity against tetanus  Td may also be given as tetanus wound prophylaxis  Tdap Vaccine - COST NOT COVERED BY MEDICARE PART B Recommended at least once for all adults  For pregnant patients, recommended with each pregnancy  Shingles Vaccine (Shingrix) - COST NOT COVERED BY MEDICARE PART B  2 shot series recommended in those aged 48 and above     Health Maintenance Due:      Topic Date Due    HIV Screening  05/06/1982    Colorectal Cancer Screening  05/06/2017     Immunizations Due:  There are no preventive care reminders to display for this patient  Advance Directives   What are advance directives? Advance directives are legal documents that state your wishes and plans for medical care  These plans are made ahead of time in case you lose your ability to make decisions for yourself  Advance directives can apply to any medical decision, such as the treatments you want, and if you want to donate organs  What are the types of advance directives? There are many types of advance directives, and each state has rules about how to use them  You may choose a combination of any of the following:  · Living will: This is a written record of the treatment you want  You can also choose which treatments you do not want, which to limit, and which to stop at a certain time  This includes surgery, medicine, IV fluid, and tube feedings  · Durable power of  for healthcare [place] [facility]): This is a written record that states who you want to make healthcare choices for you when you are unable to make them for yourself   This person, janey epstein proxy, is usually a family member or a friend  You may choose more than 1 proxy  · Do not resuscitate (DNR) order:  A DNR order is used in case your heart stops beating or you stop breathing  It is a request not to have certain forms of treatment, such as CPR  A DNR order may be included in other types of advance directives  · Medical directive: This covers the care that you want if you are in a coma, near death, or unable to make decisions for yourself  You can list the treatments you want for each condition  Treatment may include pain medicine, surgery, blood transfusions, dialysis, IV or tube feedings, and a ventilator (breathing machine)  · Values history: This document has questions about your views, beliefs, and how you feel and think about life  This information can help others choose the care that you would choose  Why are advance directives important? An advance directive helps you control your care  Although spoken wishes may be used, it is better to have your wishes written down  Spoken wishes can be misunderstood, or not followed  Treatments may be given even if you do not want them  An advance directive may make it easier for your family to make difficult choices about your care  © Copyright [employer] 2018 Information is for End User's use only and may not be sold, redistributed or otherwise used for commercial purposes   All illustrations and images included in CareNotes® are the copyrighted property of [employer]  or [address] [place] [address] [employer]

## 2021-02-17 NOTE — PROGRESS NOTES
Assessment and Plan:     Problem List Items Addressed This Visit        Endocrine    Acquired hypothyroidism    Relevant Orders    TSH, 3rd generation with Free T4 reflex       Other    Hyperlipidemia    Relevant Orders    Lipid panel    RESOLVED: Screening for colon cancer    Relevant Orders    Cologuard      Other Visit Diagnoses     Medicare annual wellness visit, subsequent    -  Primary    Screening-pulmonary TB        Relevant Orders    Quantiferon TB Gold Plus    Encounter for vision screening        Relevant Orders    Ambulatory referral to Ophthalmology    Prostate cancer screening        Relevant Orders    Ambulatory referral to Urology    Screening for diabetic peripheral neuropathy        Relevant Orders    Ambulatory referral to Podiatry        - Routine lipid panel and TSH ordered  - TB screening quantiferon ordered per group home requirements  - Referral provided to ophthalmology for thorough eye exam  - Referral provided to podiatry for routine care  - Will refer to urology for GAY as requested per group home  PSA in 2/2020 WNL  - Screening Cologuard ordered  - Continue follow up with neurology as scheduled     Preventive health issues were discussed with patient, and age appropriate screening tests were ordered as noted in patient's After Visit Summary  Personalized health advice and appropriate referrals for health education or preventive services given if needed, as noted in patient's After Visit Summary       History of Present Illness:     Patient presents for Medicare Annual Wellness visit    Patient Care Team:  Bautista Pennington as PCP - General (Nurse Practitioner)     Problem List:     Patient Active Problem List   Diagnosis    Cataract    Cerebral palsy ([address] )    Type 2 diabetes mellitus without complication, without long-term current use of insulin ([address] )    Generalized convulsive epilepsy ([address] )    Hyperlipidemia    Essential hypertension    Acquired hypothyroidism    Osteoporosis    Scoliosis    Vitamin B12 deficiency    Vitamin D deficiency    Seborrheic dermatitis of scalp    Headache    Nearsightedness    Behavior concern in adult    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HCC)    Thrombocytopathia ([facility])      Past Medical and Surgical History:     Past Medical History:   Diagnosis Date    ADRIANA (acute kidney injury) ([facility] ) 9/24/2019    CP (cerebral palsy) ([facility])     Depression     Diabetes ([place] [address] )     Disease of thyroid gland     Gait disorder     Hyperlipidemia     Hypertension     Kidney failure     Kidney stones     Osteoporosis     Psychiatric disorder     Scoliosis     Seizures ([place] [address] )     Thyroid disease     UTI (urinary tract infection)      Past Surgical History:   Procedure Laterality Date    APPENDECTOMY        Family History:     History reviewed  No pertinent family history     Social History:        Social History     Socioeconomic History    Marital status: Single     Spouse name: None    Number of children: None    Years of education: None    Highest education level: None   Occupational History    None   Social Needs    Financial resource strain: None    Food insecurity     Worry: None     Inability: None    Transportation needs     Medical: None     Non-medical: None   Tobacco Use    Smoking status: Never Smoker    Smokeless tobacco: Never Used   Substance and Sexual Activity    Alcohol use: Never     Frequency: Never    Drug use: No    Sexual activity: Not Currently   Lifestyle    Physical activity     Days per week: None     Minutes per session: None    Stress: None   Relationships    Social connections     Talks on phone: None     Gets together: None     Attends Congregation service: None     Active member of club or organization: None     Attends meetings of clubs or organizations: None     Relationship status: None    Intimate partner violence     Fear of current or ex partner: None     Emotionally abused: None Physically abused: None     Forced sexual activity: None   Other Topics Concern    None   Social History Narrative    None      Medications and Allergies:     Current Outpatient Medications   Medication Sig Dispense Refill    benzonatate (TESSALON) 200 MG capsule Take 1 capsule (200 mg total) by mouth 3 (three) times a day as needed for cough 20 capsule 0    benztropine (COGENTIN) 0 5 mg tablet TAKE 1 TABLET BY MOUTH TWICE A DAY (8AM,8PM) 56 tablet 4    Blood Glucose Monitoring Suppl (ONETOUCH VERIO) w/Device KIT by Does not apply route 3 (three) times a day TEST BLOOD SUGARS THREE TIMES 1 kit 0    calcium carbonate (OYSTER SHELL,OSCAL) 500 mg TAKE 1 TABLET BY MOUTH 3 TIMES A DAY (8AM,2PM,8PM) 84 each 4    Cholecalciferol (Vitamin D3) 25 MCG TABS TAKE 1 TABLET BY MOUTH DAILY (8AM) (DX: VITAMIN DAILY DEFICIENCY) 28 tablet 4    Depakote  MG 24 hr tablet TAKE 1 TABLET BY MOUTH IN THE MORNING AND TAKE 2 TABLETS BY MOUTH AT BEDTIME  *BRAND NECESSARY* 90 tablet 5    gabapentin (NEURONTIN) 100 mg capsule Take 100 mg by mouth 3 (three) times a day       glucose blood (OneTouch Verio) test strip TEST BLOOD SUGARS THREE TIMES DAILY 100 each 5    Humidifiers (Cool Mist Humidifier 1 gallon) MISC Use daily at bedtime 1 each 0    hydrOXYzine pamoate (VISTARIL) 50 mg capsule TAKE 1 CAPSULE BY MOUTH 3 TIMES A DAY 84 capsule 10    ibuprofen (MOTRIN) 600 mg tablet Take 1 tablet (600 mg total) by mouth every 8 (eight) hours 60 tablet 0    Insulin Pen Needle (PEN NEEDLES) 31G X 5 MM MISC by Does not apply route daily 100 each 5    ketoconazole (NIZORAL) 2 % shampoo Wash hair and sideburns at least 3 times per week 120 mL 3    lacosamide (VIMPAT) 200 mg tablet Take 1 tablet (200 mg total) by mouth every 12 (twelve) hours 180 tablet 3    levETIRAcetam (KEPPRA) 750 mg tablet Take 2 tablets (1,500 mg total) by mouth 2 (two) times a day 120 tablet 11    levothyroxine 125 mcg tablet TAKE 1 TABLET BY MOUTH DAILY (8AM) (DX: OTHER SPECIFIED HYPOTHYROIDISM) 28 tablet 4    lisinopril (ZESTRIL) 10 mg tablet TAKE 1 TABLET BY MOUTH DAILY (8AM) (DX: HYPERTENSION) 28 tablet 4    loratadine (CLARITIN) 10 mg tablet One tab daily as needed for allergy symptoms 90 tablet 0    metFORMIN (GLUCOPHAGE) 1000 MG tablet TAKE 1 TABLET BY MOUTH TWICE A DAY WITH MEALS (8AM, 8PM) (DX: DIABETES MELLITUS) 56 tablet 4    Multiple Vitamin (Daily-Reina) TABS TAKE 1 TABLET BY MOUTH DAILY (8AM) 28 tablet 4    OneTouch Delica Lancets 90M MISC by Does not apply route daily TEST BLOOD SUGARS THREE TIMES DAILY 100 each 2    polyethylene glycol (MIRALAX) 17 g packet Take 17 g by mouth daily 14 each 8    QUEtiapine (SEROquel XR) 200 mg 24 hr tablet TAKE 1 TABLET BY MOUTH TWICE A DAY (8AM,2PM) 16 tablet 10    QUEtiapine (SEROquel XR) 400 mg 24 hr tablet TAKE 1 TABLET BY MOUTH AT BEDTIME (8PM) (DX: ANTIPSYCHOTIC) 28 tablet 10    simvastatin (ZOCOR) 40 mg tablet TAKE 1 TABLET BY MOUTH DAILY (DX: HYPERLIPIDEMIA) 28 tablet 4    sodium chloride (OCEAN) 0 65 % nasal spray 1 spray into each nostril as needed for congestion 15 mL 1    Stool Softener 100 MG capsule TAKE 1 CAPSULE BY MOUTH TWICE A DAY (8AM,8PM) (DX: CONSTIPATION) 56 capsule 4    temazepam (RESTORIL) 15 mg capsule Take 15 mg by mouth daily at bedtime as needed for sleep       triamcinolone (KENALOG) 0 1 % cream Apply topically 2 (two) times a day 30 g 0    vitamin B-12 (VITAMIN B-12) 1,000 mcg tablet TAKE 1 TABLET BY MOUTH EVERY OTHER DAY (8AM) 14 tablet 10    temazepam (RESTORIL) 7 5 mg capsule Take 15 mg by mouth daily at bedtime as needed for sleep       No current facility-administered medications for this visit        Allergies   Allergen Reactions    Erythromycin Other (See Comments)     Unknown per pt    Penicillins Other (See Comments)     Unknown per pt      Immunizations:     Immunization History   Administered Date(s) Administered    INFLUENZA 12/09/2015    Influenza Quadrivalent Preservative Free 3 years and older IM 12/09/2015, 12/12/2017    Influenza, recombinant, quadrivalent,injectable, preservative free 12/13/2018, 11/19/2019, 10/15/2020    Influenza, seasonal, injectable 12/19/2016    Pneumococcal Polysaccharide PPV23 12/12/2017    Tdap 06/15/2017    Tuberculin Skin Test-PPD Intradermal 12/19/2016, 03/08/2017, 03/14/2019      Health Maintenance:         Topic Date Due    HIV Screening  05/06/1980    Colorectal Cancer Screening  05/06/2017     There are no preventive care reminders to display for this patient  Medicare Health Risk Assessment:     /70   Pulse 100   Temp 98 9 °F (37 2 °C) (Tympanic)   Resp 18   Ht 6' (1 829 m)   Wt 72 6 kg (160 lb)   BMI 21 70 kg/m²      Daniel Uribe is here for his Subsequent Wellness visit  Health Risk Assessment:   Patient rates overall health as good  Patient feels that their physical health rating is same  Eyesight was rated as same  Hearing was rated as same  Patient feels that their emotional and mental health rating is slightly better  Pain experienced in the last 7 days has been none  Patient states that he has experienced no weight loss or gain in last 6 months  Depression Screening:   PHQ-2 Score: 0      Fall Risk Screening: In the past year, patient has experienced: history of falling in past year    Number of falls: 2 or more  Injured during fall?: Yes    Feels unsteady when standing or walking?: Yes    Worried about falling?: Yes      Home Safety:  Patient does not have trouble with stairs inside or outside of their home  Patient has working smoke alarms and has working carbon monoxide detector  Home safety hazards include: none  Nutrition:   Current diet is Regular  No red meat    Medications:   Patient is not currently taking any over-the-counter supplements  Patient is not able to manage medications       Activities of Daily Living (ADLs)/Instrumental Activities of Daily Living (IADLs):   Walk and transfer into and out of bed and chair?: Yes  Dress and groom yourself?: Yes    Bathe or shower yourself?: Yes    Feed yourself?  Yes  Do your laundry/housekeeping?: No  Manage your money, pay your bills and track your expenses?: No  Make your own meals?: Yes    Do your own shopping?: No    Previous Hospitalizations:   Any hospitalizations or ED visits within the last 12 months?: Yes    How many hospitalizations have you had in the last year?: 3-4    Hospitalization Comments: 7/2020 9/2020 1/2021    Advance Care Planning:   Living will: No    Durable POA for healthcare: No    Advanced directive: No      PREVENTIVE SCREENINGS      Cardiovascular Screening:    General: History Lipid Disorder and Risks and Benefits Discussed    Due for: Lipid Panel      Diabetes Screening:     General: History Diabetes and Screening Current      Colorectal Cancer Screening:     General: Risks and Benefits Discussed    Due for: Cologuard      Prostate Cancer Screening:    General: Screening Current      Osteoporosis Screening:    General: Screening Not Indicated and History Osteoporosis      Abdominal Aortic Aneurysm (AAA) Screening:        General: Risks and Benefits Discussed and Screening Not Indicated      Lung Cancer Screening:     General: Risks and Benefits Discussed and Screening Not Indicated      Hepatitis C Screening:    General: Risks and Benefits Discussed and Screening Not Indicated      Janette Ortiz PA-C

## 2021-03-04 ENCOUNTER — LAB (OUTPATIENT)
Dept: LAB | Facility: CLINIC | Age: 56
End: 2021-03-04
Payer: MEDICARE

## 2021-03-04 DIAGNOSIS — Z11.1 SCREENING-PULMONARY TB: ICD-10-CM

## 2021-03-04 DIAGNOSIS — E03.9 ACQUIRED HYPOTHYROIDISM: ICD-10-CM

## 2021-03-04 DIAGNOSIS — E78.5 HYPERLIPIDEMIA, UNSPECIFIED HYPERLIPIDEMIA TYPE: ICD-10-CM

## 2021-03-04 LAB
CHOLEST SERPL-MCNC: 154 MG/DL (ref 50–200)
HDLC SERPL-MCNC: 63 MG/DL
LDLC SERPL CALC-MCNC: 75 MG/DL (ref 0–100)
NONHDLC SERPL-MCNC: 91 MG/DL
TRIGL SERPL-MCNC: 78 MG/DL
TSH SERPL DL<=0.05 MIU/L-ACNC: 1.92 UIU/ML (ref 0.36–3.74)

## 2021-03-04 PROCEDURE — 84443 ASSAY THYROID STIM HORMONE: CPT

## 2021-03-04 PROCEDURE — 36415 COLL VENOUS BLD VENIPUNCTURE: CPT

## 2021-03-04 PROCEDURE — 80061 LIPID PANEL: CPT

## 2021-03-04 PROCEDURE — 86480 TB TEST CELL IMMUN MEASURE: CPT

## 2021-03-08 LAB
GAMMA INTERFERON BACKGROUND BLD IA-ACNC: 0.02 IU/ML
M TB IFN-G BLD-IMP: NEGATIVE
M TB IFN-G CD4+ BCKGRND COR BLD-ACNC: 0 IU/ML
M TB IFN-G CD4+ BCKGRND COR BLD-ACNC: 0 IU/ML
MITOGEN IGNF BCKGRD COR BLD-ACNC: >10 IU/ML

## 2021-03-18 DIAGNOSIS — G40.309 GENERALIZED CONVULSIVE EPILEPSY (HCC): ICD-10-CM

## 2021-03-18 DIAGNOSIS — I10 HYPERTENSION, UNSPECIFIED TYPE: ICD-10-CM

## 2021-03-19 RX ORDER — LISINOPRIL 10 MG/1
TABLET ORAL
Qty: 28 TABLET | Refills: 4 | Status: ON HOLD | OUTPATIENT
Start: 2021-03-19 | End: 2021-09-07

## 2021-03-19 RX ORDER — LACOSAMIDE 200 MG/1
TABLET, FILM COATED ORAL
Qty: 60 TABLET | Refills: 2 | Status: SHIPPED | OUTPATIENT
Start: 2021-03-19 | End: 2021-07-15

## 2021-04-11 DIAGNOSIS — K59.00 CONSTIPATION, UNSPECIFIED CONSTIPATION TYPE: ICD-10-CM

## 2021-04-11 DIAGNOSIS — E08.00 DIABETES MELLITUS DUE TO UNDERLYING CONDITION WITH HYPEROSMOLARITY WITHOUT COMA, WITHOUT LONG-TERM CURRENT USE OF INSULIN (HCC): ICD-10-CM

## 2021-04-13 RX ORDER — DOCUSATE SODIUM 100 MG
CAPSULE ORAL
Qty: 56 CAPSULE | Refills: 4 | Status: SHIPPED | OUTPATIENT
Start: 2021-04-13 | End: 2021-10-05

## 2021-05-07 DIAGNOSIS — E08.00 DIABETES MELLITUS DUE TO UNDERLYING CONDITION WITH HYPEROSMOLARITY WITHOUT COMA, WITHOUT LONG-TERM CURRENT USE OF INSULIN (HCC): ICD-10-CM

## 2021-05-07 DIAGNOSIS — E78.5 HYPERLIPIDEMIA, UNSPECIFIED HYPERLIPIDEMIA TYPE: ICD-10-CM

## 2021-05-07 DIAGNOSIS — I10 HYPERTENSION, UNSPECIFIED TYPE: ICD-10-CM

## 2021-05-07 DIAGNOSIS — E06.3 HYPOTHYROIDISM DUE TO HASHIMOTO'S THYROIDITIS: ICD-10-CM

## 2021-05-07 DIAGNOSIS — E03.8 HYPOTHYROIDISM DUE TO HASHIMOTO'S THYROIDITIS: ICD-10-CM

## 2021-05-07 DIAGNOSIS — R56.9 SEIZURE (HCC): ICD-10-CM

## 2021-05-07 DIAGNOSIS — G62.9 NEUROPATHY: ICD-10-CM

## 2021-05-10 RX ORDER — LEVOTHYROXINE SODIUM 0.12 MG/1
TABLET ORAL
Qty: 28 TABLET | Refills: 4 | Status: SHIPPED | OUTPATIENT
Start: 2021-05-10 | End: 2021-12-17 | Stop reason: SDUPTHER

## 2021-05-10 RX ORDER — MULTIVITAMIN WITH FOLIC ACID 400 MCG
TABLET ORAL
Qty: 28 TABLET | Refills: 4 | Status: SHIPPED | OUTPATIENT
Start: 2021-05-10 | End: 2021-12-17 | Stop reason: SDUPTHER

## 2021-05-10 RX ORDER — SIMVASTATIN 40 MG
TABLET ORAL
Qty: 28 TABLET | Refills: 4 | Status: SHIPPED | OUTPATIENT
Start: 2021-05-10 | End: 2021-12-17 | Stop reason: SDUPTHER

## 2021-05-10 RX ORDER — CALCIUM CARBONATE 500(1250)
TABLET ORAL
Qty: 84 TABLET | Refills: 4 | Status: SHIPPED | OUTPATIENT
Start: 2021-05-10 | End: 2021-12-17 | Stop reason: SDUPTHER

## 2021-05-14 DIAGNOSIS — G40.309 GENERALIZED CONVULSIVE EPILEPSY (HCC): ICD-10-CM

## 2021-05-14 RX ORDER — DIVALPROEX SODIUM 500 MG
TABLET, EXTENDED RELEASE 24 HR ORAL
Qty: 90 TABLET | Refills: 11 | Status: SHIPPED | OUTPATIENT
Start: 2021-05-14 | End: 2022-02-18 | Stop reason: SDUPTHER

## 2021-05-18 ENCOUNTER — HOSPITAL ENCOUNTER (EMERGENCY)
Facility: HOSPITAL | Age: 56
Discharge: HOME | End: 2021-05-18
Attending: EMERGENCY MEDICINE | Admitting: EMERGENCY MEDICINE
Payer: MEDICARE

## 2021-05-18 ENCOUNTER — OFFICE VISIT (OUTPATIENT)
Dept: URGENT CARE | Age: 56
End: 2021-05-18
Payer: MEDICARE

## 2021-05-18 VITALS
DIASTOLIC BLOOD PRESSURE: 72 MMHG | TEMPERATURE: 96.9 F | HEART RATE: 92 BPM | SYSTOLIC BLOOD PRESSURE: 169 MMHG | WEIGHT: 160 LBS | OXYGEN SATURATION: 98 % | BODY MASS INDEX: 21.7 KG/M2

## 2021-05-18 VITALS
HEART RATE: 75 BPM | OXYGEN SATURATION: 96 % | TEMPERATURE: 97.9 F | DIASTOLIC BLOOD PRESSURE: 84 MMHG | SYSTOLIC BLOOD PRESSURE: 170 MMHG | RESPIRATION RATE: 20 BRPM

## 2021-05-18 DIAGNOSIS — G25.2 COARSE TREMORS: ICD-10-CM

## 2021-05-18 DIAGNOSIS — R25.3 MUSCLE TWITCHING: Primary | ICD-10-CM

## 2021-05-18 DIAGNOSIS — G25.3 MYOCLONUS: Primary | ICD-10-CM

## 2021-05-18 LAB
ALBUMIN SERPL BCP-MCNC: 4.2 G/DL (ref 3.5–5.7)
ALP SERPL-CCNC: 32 U/L (ref 40–150)
ALT SERPL W P-5'-P-CCNC: 14 U/L (ref 7–52)
AMMONIA PLAS-SCNC: 63 UMOL/L (ref 25–90)
ANION GAP SERPL CALCULATED.3IONS-SCNC: 10 MMOL/L (ref 4–13)
AST SERPL W P-5'-P-CCNC: 21 U/L (ref 13–39)
BASOPHILS # BLD AUTO: 0.1 THOUSANDS/ΜL (ref 0–0.1)
BASOPHILS NFR BLD AUTO: 1 % (ref 0–2)
BILIRUB SERPL-MCNC: 0.2 MG/DL (ref 0.2–1)
BUN SERPL-MCNC: 21 MG/DL (ref 7–25)
CALCIUM SERPL-MCNC: 9.5 MG/DL (ref 8.6–10.5)
CHLORIDE SERPL-SCNC: 100 MMOL/L (ref 98–107)
CK SERPL-CCNC: 80 U/L (ref 30–308)
CO2 SERPL-SCNC: 29 MMOL/L (ref 21–31)
CREAT SERPL-MCNC: 0.63 MG/DL (ref 0.7–1.3)
EOSINOPHIL # BLD AUTO: 0.2 THOUSAND/ΜL (ref 0–0.61)
EOSINOPHIL NFR BLD AUTO: 4 % (ref 0–5)
ERYTHROCYTE [DISTWIDTH] IN BLOOD BY AUTOMATED COUNT: 13.7 % (ref 11.5–14.5)
GFR SERPL CREATININE-BSD FRML MDRD: 112 ML/MIN/1.73SQ M
GLUCOSE SERPL-MCNC: 129 MG/DL (ref 65–99)
GLUCOSE SERPL-MCNC: 97 MG/DL (ref 65–140)
HCT VFR BLD AUTO: 38.5 % (ref 42–47)
HGB BLD-MCNC: 12.8 G/DL (ref 14–18)
LACTATE SERPL-SCNC: 1.9 MMOL/L (ref 0.5–2)
LYMPHOCYTES # BLD AUTO: 2.5 THOUSANDS/ΜL (ref 0.6–4.47)
LYMPHOCYTES NFR BLD AUTO: 54 % (ref 21–51)
MCH RBC QN AUTO: 31.9 PG (ref 26–34)
MCHC RBC AUTO-ENTMCNC: 33.4 G/DL (ref 31–37)
MCV RBC AUTO: 96 FL (ref 81–99)
MONOCYTES # BLD AUTO: 0.7 THOUSAND/ΜL (ref 0.17–1.22)
MONOCYTES NFR BLD AUTO: 15 % (ref 2–12)
NEUTROPHILS # BLD AUTO: 1.3 THOUSANDS/ΜL (ref 1.4–6.5)
NEUTS SEG NFR BLD AUTO: 27 % (ref 42–75)
PLATELET # BLD AUTO: 151 THOUSANDS/UL (ref 149–390)
PMV BLD AUTO: 10.2 FL (ref 8.6–11.7)
POTASSIUM SERPL-SCNC: 4.1 MMOL/L (ref 3.5–5.5)
PROT SERPL-MCNC: 7.1 G/DL (ref 6.4–8.9)
RBC # BLD AUTO: 4.02 MILLION/UL (ref 4.3–5.9)
SL AMB POCT GLUCOSE BLD: 97
SODIUM SERPL-SCNC: 139 MMOL/L (ref 134–143)
VALPROATE SERPL-MCNC: 80.2 UG/ML (ref 50–125)
WBC # BLD AUTO: 4.8 THOUSAND/UL (ref 4.8–10.8)

## 2021-05-18 PROCEDURE — G0463 HOSPITAL OUTPT CLINIC VISIT: HCPCS | Performed by: PHYSICIAN ASSISTANT

## 2021-05-18 PROCEDURE — 82550 ASSAY OF CK (CPK): CPT | Performed by: PHYSICIAN ASSISTANT

## 2021-05-18 PROCEDURE — 99285 EMERGENCY DEPT VISIT HI MDM: CPT

## 2021-05-18 PROCEDURE — 96360 HYDRATION IV INFUSION INIT: CPT

## 2021-05-18 PROCEDURE — 80164 ASSAY DIPROPYLACETIC ACD TOT: CPT | Performed by: PHYSICIAN ASSISTANT

## 2021-05-18 PROCEDURE — 99285 EMERGENCY DEPT VISIT HI MDM: CPT | Performed by: PHYSICIAN ASSISTANT

## 2021-05-18 PROCEDURE — 82140 ASSAY OF AMMONIA: CPT | Performed by: PHYSICIAN ASSISTANT

## 2021-05-18 PROCEDURE — 82948 REAGENT STRIP/BLOOD GLUCOSE: CPT

## 2021-05-18 PROCEDURE — 36415 COLL VENOUS BLD VENIPUNCTURE: CPT | Performed by: PHYSICIAN ASSISTANT

## 2021-05-18 PROCEDURE — 99213 OFFICE O/P EST LOW 20 MIN: CPT | Performed by: PHYSICIAN ASSISTANT

## 2021-05-18 PROCEDURE — 93005 ELECTROCARDIOGRAM TRACING: CPT

## 2021-05-18 PROCEDURE — 80177 DRUG SCRN QUAN LEVETIRACETAM: CPT | Performed by: PHYSICIAN ASSISTANT

## 2021-05-18 PROCEDURE — 83605 ASSAY OF LACTIC ACID: CPT | Performed by: PHYSICIAN ASSISTANT

## 2021-05-18 PROCEDURE — 85025 COMPLETE CBC W/AUTO DIFF WBC: CPT | Performed by: PHYSICIAN ASSISTANT

## 2021-05-18 PROCEDURE — 80053 COMPREHEN METABOLIC PANEL: CPT | Performed by: PHYSICIAN ASSISTANT

## 2021-05-18 RX ADMIN — SODIUM CHLORIDE 500 ML: 0.9 INJECTION, SOLUTION INTRAVENOUS at 20:37

## 2021-05-18 NOTE — PROGRESS NOTES
[facility]        NAME: Marcin Hicks is a 49 y o  male  : 1965    MRN: [id number]  DATE: May 18, 2021  TIME: 6:06 PM    Assessment and Plan   Muscle twitching [R25 3]  1  Muscle twitching  POCT blood glucose   2  Coarse tremors      I discussed with patient caregiver that the tremors could have numerous causes anywhere from electrolyte imbalance to tardive dyskinesia from chronic anticonvulsants and antipsychotic use  to early onset Parkinson's to pre-seizure activity  At this time he does not appear to be any any distress and the only change has been increased of tremoring  I have discussed watchful waiting if episodes increase in frequency and in length he should take patient to the ER for further evaluation,  or should he have any changes in behavior or activity  Patient should otherwise follow up with his primary care provider or neurologist in the next couple of days  Patient Instructions   Recommend Gatorade or Pedialyte as patient has had poor oral intake of fluids this will help balance electrolytes  Consider using Thick-it in fluids to prevent choking  Continue watchful waiting of symptoms,  If symptoms worsen reports the emergency room  follow-up with primary care doctor and neurologist in the next 2-3 days  if symptoms worsen reports the emergency room  Tremors   AMBULATORY CARE:   A tremor  is a movement you cannot control that occurs in a rhythm  Tremors most commonly occur in the hands  Other common places include the head or face, trunk, or legs  Your voice can also have a tremor and sound shaky when you speak  A tremor may be caused by a nerve problem, too much thyroid hormone, or by certain medicines, caffeine, or alcohol  Tremors may be temporary or permanent  The tremor may go away and return, or worsen with stress  Tremors can happen at any age, but they are more common in later years    Contact your healthcare provider if:   · You have a new or worsening tremor  · You have tremors that make it difficult to do your regular activities  · You have questions or concerns about your condition or care  Treatment  may include medicines to help control some kinds of tremors  You may not need treatment if your tremor is mild  You may need treatment for a condition that can cause tremors, such as a thyroid disorder  Your healthcare provider may stop or change a medicine that may be causing tremors  Do not stop or change any medicine unless your doctor tells you to  Tremors may also be controlled by surgery if no other treatment works  Manage your symptoms:   · Do not have caffeine or other chemicals that affect your nerves  Limit or do not have caffeine  Caffeine can make tremors worse  Talk to your healthcare provider about herbal medicines, teas, and supplements  They may also increase tremors  Do not use illegal drugs  · Go to physical and occupational therapy as directed  A physical therapist can teach you exercises to help reduce the tremor and improve muscle control  You may be shown how to hold the body part during a tremor to help control the movement  The therapist can also help you build strength and balance  An occupational therapist can show you how to use adaptive devices to help you move more easily  · Use objects that will help you control movements  You may have more hand control if you add a watch or bracelet to your wrist  It may be easier for you to drink from a straw, or to fill your cup only half full  Cups with lids, such as travel mugs, can also help you drink with more control  Heavy eating utensils can help you eat more easily  A button fastener can help you button clothing if tremors in your hands make this difficult  · Set a regular sleep schedule  Lack of sleep can make tremors worse  Try to go to bed at the same time each night and wake up at the same time each morning      Follow up with your healthcare provider as directed: Write down your questions so you remember to ask them during your visits  © Copyright [address] Information is for End User's use only and may not be sold, redistributed or otherwise used for commercial purposes  All illustrations and images included in CareNotes® are the copyrighted property of A D A M , Inc  or Khurram Estevez   The above information is an  only  It is not intended as medical advice for individual conditions or treatments  Talk to your doctor, nurse or pharmacist before following any medical regimen to see if it is safe and effective for you  Chief Complaint     Chief Complaint   Patient presents with    Involuntary Movements     per pt's caregiver, pt is haivng increased "twitching" and involuntary muscle movements  First episode occured 2 days ago  Additional episodes reported since then  No loss of consciousness reported  History of Present Illness         68-year-old male presents with his caregiver with concerns for new onset muscle twitching and jerking that began approximately 2 days ago  Caregiver notes that episodes have continued and seemed to be coming more frequently occurring for longer periods of time  Caregiver states that the jerking seems to occur at rest and not just with movement  Patient has not had any falls, head injuries, or loss of consciousness  Patient is cognitively impaired adult with history of seizures disorders tonic-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  He has had no recent seizure activity  Patient is taking all medications as prescribed though sometimes they are not on the schedule as he will occasionally initially refused them  Pt's care for giver reports the patient's mental status has not changed neither has any of his behavior  He notes that patient will occasionally not communicate well that he has had enough fluids and occasionally chokes and when this happens he will refuse fluids for a period of time    Patient has not been taking good oral intake of fluids for the last several days  Patient is mildly verbal and does hello and nod his head when questions are answered  He follows instructions well  Caregiver also notes that patient's gait seems somewhat different than normal but notes that if he focuses well the patient has a normal gait  No other concerns or complaints at this time  Review of Systems   Review of Systems   Constitutional: Negative for activity change, appetite change and unexpected weight change  Musculoskeletal: Positive for gait problem  Neurological: Positive for tremors and speech difficulty  Negative for dizziness, seizures, syncope, weakness and light-headedness           Current Medications       Current Outpatient Medications:     benzonatate (TESSALON) 200 MG capsule, Take 1 capsule (200 mg total) by mouth 3 (three) times a day as needed for cough, Disp: 20 capsule, Rfl: 0    benztropine (COGENTIN) 0 5 mg tablet, TAKE 1 TABLET BY MOUTH TWICE A DAY (8AM,8PM), Disp: 56 tablet, Rfl: 4    Blood Glucose Monitoring Suppl (ONETOUCH VERIO) w/Device KIT, by Does not apply route 3 (three) times a day TEST BLOOD SUGARS THREE TIMES, Disp: 1 kit, Rfl: 0    calcium carbonate (OYSTER SHELL,OSCAL) 500 mg, TAKE 1 TABLET BY MOUTH 3 TIMES A DAY (8AM,2PM,8PM), Disp: 84 tablet, Rfl: 4    Cholecalciferol (Vitamin D3) 25 MCG TABS, TAKE 1 TABLET BY MOUTH DAILY (8AM) (DX: VITAMIN DAILY DEFICIENCY), Disp: 28 tablet, Rfl: 4    Depakote  MG 24 hr tablet, TAKE 1 TABLET BY MOUTH IN THE MORNING *BRAND NECESSARY*;TAKE 2 TABLETS BY MOUTH AT BEDTIME, Disp: 90 tablet, Rfl: 11    gabapentin (NEURONTIN) 100 mg capsule, Take 100 mg by mouth 3 (three) times a day , Disp: , Rfl:     glucose blood (OneTouch Verio) test strip, TEST BLOOD SUGARS THREE TIMES DAILY, Disp: 100 each, Rfl: 5    hydrOXYzine pamoate (VISTARIL) 50 mg capsule, TAKE 1 CAPSULE BY MOUTH 3 TIMES A DAY, Disp: 84 capsule, Rfl: 10    ibuprofen (MOTRIN) 600 mg tablet, Take 1 tablet (600 mg total) by mouth every 8 (eight) hours, Disp: 60 tablet, Rfl: 0    Insulin Pen Needle (PEN NEEDLES) 31G X 5 MM MISC, by Does not apply route daily, Disp: 100 each, Rfl: 5    ketoconazole (NIZORAL) 2 % shampoo, Wash hair and sideburns at least 3 times per week, Disp: 120 mL, Rfl: 3    levETIRAcetam (KEPPRA) 750 mg tablet, Take 2 tablets (1,500 mg total) by mouth 2 (two) times a day, Disp: 120 tablet, Rfl: 11    levothyroxine 125 mcg tablet, TAKE 1 TABLET BY MOUTH DAILY (8AM) (DX: OTHER SPECIFIED HYPOTHYROIDISM), Disp: 28 tablet, Rfl: 4    lisinopril (ZESTRIL) 10 mg tablet, TAKE 1 TABLET BY MOUTH DAILY (8AM) (DX: HYPERTENSION), Disp: 28 tablet, Rfl: 4    loratadine (CLARITIN) 10 mg tablet, One tab daily as needed for allergy symptoms, Disp: 90 tablet, Rfl: 0    metFORMIN (GLUCOPHAGE) 1000 MG tablet, TAKE 1 TABLET BY MOUTH TWICE A DAY WITH MEALS (8AM, 8PM) (DX: DIABETES MELLITUS), Disp: 56 tablet, Rfl: 4    Multiple Vitamin (Daily-Reina) TABS, TAKE 1 TABLET BY MOUTH DAILY (8AM), Disp: 28 tablet, Rfl: 4    OneTouch Delica Lancets 50Z MISC, by Does not apply route daily TEST BLOOD SUGARS THREE TIMES DAILY, Disp: 100 each, Rfl: 2    polyethylene glycol (MIRALAX) 17 g packet, Take 17 g by mouth daily, Disp: 14 each, Rfl: 8    QUEtiapine (SEROquel XR) 200 mg 24 hr tablet, TAKE 1 TABLET BY MOUTH TWICE A DAY (8AM,2PM), Disp: 16 tablet, Rfl: 10    QUEtiapine (SEROquel XR) 400 mg 24 hr tablet, TAKE 1 TABLET BY MOUTH AT BEDTIME (8PM) (DX: ANTIPSYCHOTIC), Disp: 28 tablet, Rfl: 10    simvastatin (ZOCOR) 40 mg tablet, TAKE 1 TABLET BY MOUTH DAILY (DX: HYPERLIPIDEMIA), Disp: 28 tablet, Rfl: 4    Stool Softener 100 MG capsule, TAKE 1 CAPSULE BY MOUTH TWICE A DAY (8AM,8PM) (DX: CONSTIPATION), Disp: 56 capsule, Rfl: 4    temazepam (RESTORIL) 15 mg capsule, Take 15 mg by mouth daily at bedtime as needed for sleep , Disp: , Rfl:     temazepam (RESTORIL) 7 5 mg capsule, Take 15 mg by mouth daily at bedtime as needed for sleep, Disp: , Rfl:     triamcinolone (KENALOG) 0 1 % cream, Apply topically 2 (two) times a day, Disp: 30 g, Rfl: 0    Vimpat 200 MG tablet, TAKE 1 TABLET BY MOUTH EVERY 12 HOURS (DX:EPILEPSY), Disp: 60 tablet, Rfl: 2    vitamin B-12 (VITAMIN B-12) 1,000 mcg tablet, TAKE 1 TABLET BY MOUTH EVERY OTHER DAY (8AM), Disp: 14 tablet, Rfl: 10    Humidifiers (Cool Mist Humidifier 1 gallon) MISC, Use daily at bedtime, Disp: 1 each, Rfl: 0    sodium chloride (OCEAN) 0 65 % nasal spray, 1 spray into each nostril as needed for congestion, Disp: 15 mL, Rfl: 1    Current Allergies     Allergies as of 05/18/2021 - Reviewed 05/18/2021   Allergen Reaction Noted    Erythromycin Other (See Comments) 11/22/2013    Penicillins Other (See Comments) 11/22/2013            The following portions of the patient's history were reviewed and updated as appropriate: allergies, current medications, past family history, past medical history, past social history, past surgical history and problem list      Past Medical History:   Diagnosis Date    [facility]ANA [facility]acute [facility]) ([facility] [address] ) 9/24/2019    CP (cerebral palsy) ([facility] [address] )     Depression     Diabetes ([facility] [address] [facility]     Disease of thyroid gland     Gait disorder     Hyperlipidemia     Hypertension     Kidney failure     Kidney stones     Osteoporosis     Psychiatric disorder     Scoliosis     Seizures ([facility] [address] )     Thyroid disease     UTI (urinary tract infection)        Past Surgical History:   Procedure Laterality Date    APPENDECTOMY         History reviewed  No pertinent family history  Medications have been verified  Objective   /72   Pulse 92   Temp (!) 96 9 °F (36 1 °C)   Wt 72 6 kg (160 lb) Comment: estimated  SpO2 98%   BMI 21 70 kg/m²   No LMP for male patient  Physical Exam     Physical Exam  Vitals signs and nursing note reviewed  Constitutional:       General: He is awake  He is not in acute distress  Appearance: Normal appearance  He is well-developed and well-groomed  He is not ill-appearing, toxic-appearing or diaphoretic  HENT:      Head: Normocephalic and atraumatic  Right Ear: Hearing and external ear normal       Left Ear: Hearing and external ear normal       Nose: Nose normal       Mouth/Throat:      Lips: No lesions  Eyes:      General: Lids are normal  Vision grossly intact  Gaze aligned appropriately  Extraocular Movements: Extraocular movements intact  Conjunctiva/sclera: Conjunctivae normal    Neck:      Musculoskeletal: Normal range of motion  Cardiovascular:      Rate and Rhythm: Normal rate and regular rhythm  Pulmonary:      Effort: Pulmonary effort is normal       Breath sounds: Normal breath sounds  No decreased breath sounds, wheezing, rhonchi or rales  Musculoskeletal:      Comments:   Note patient has some cogwheeling of the upper extremities  Tremor is noted when patient is asked to do specific activities particular removed moving upper extremities  Patient has 5/5 strength with  and 5/5 strength with plantar and dorsiflexion no tremor to the lower extremities noted  Skin:     General: Skin is warm and dry  Neurological:      General: No focal deficit present  Mental Status: He is alert  Mental status is at baseline  Cranial Nerves: Cranial nerves are intact  Sensory: Sensation is intact  Gait: Gait is intact  Psychiatric:         Attention and Perception: Attention and perception normal          Mood and Affect: Mood and affect normal          Speech: Speech is delayed  Behavior: Behavior normal  Behavior is cooperative

## 2021-05-18 NOTE — PATIENT INSTRUCTIONS
Recommend Gatorade or Pedialyte as patient has had poor oral intake of fluids this will help balance electrolytes  Consider using Thick-it in fluids to prevent choking  Continue watchful waiting of symptoms,  If symptoms worsen reports the emergency room  follow-up with primary care doctor and neurologist in the next 2-3 days  if symptoms worsen reports the emergency room  Tremors   AMBULATORY CARE:   A tremor  is a movement you cannot control that occurs in a rhythm  Tremors most commonly occur in the hands  Other common places include the head or face, trunk, or legs  Your voice can also have a tremor and sound shaky when you speak  A tremor may be caused by a nerve problem, too much thyroid hormone, or by certain medicines, caffeine, or alcohol  Tremors may be temporary or permanent  The tremor may go away and return, or worsen with stress  Tremors can happen at any age, but they are more common in later years  Contact your healthcare provider if:   · You have a new or worsening tremor  · You have tremors that make it difficult to do your regular activities  · You have questions or concerns about your condition or care  Treatment  may include medicines to help control some kinds of tremors  You may not need treatment if your tremor is mild  You may need treatment for a condition that can cause tremors, such as a thyroid disorder  Your healthcare provider may stop or change a medicine that may be causing tremors  Do not stop or change any medicine unless your doctor tells you to  Tremors may also be controlled by surgery if no other treatment works  Manage your symptoms:   · Do not have caffeine or other chemicals that affect your nerves  Limit or do not have caffeine  Caffeine can make tremors worse  Talk to your healthcare provider about herbal medicines, teas, and supplements  They may also increase tremors  Do not use illegal drugs      · Go to physical and occupational therapy as directed  A physical therapist can teach you exercises to help reduce the tremor and improve muscle control  You may be shown how to hold the body part during a tremor to help control the movement  The therapist can also help you build strength and balance  An occupational therapist can show you how to use adaptive devices to help you move more easily  · Use objects that will help you control movements  You may have more hand control if you add a watch or bracelet to your wrist  It may be easier for you to drink from a straw, or to fill your cup only half full  Cups with lids, such as travel mugs, can also help you drink with more control  Heavy eating utensils can help you eat more easily  A button fastener can help you button clothing if tremors in your hands make this difficult  · Set a regular sleep schedule  Lack of sleep can make tremors worse  Try to go to bed at the same time each night and wake up at the same time each morning  Follow up with your healthcare provider as directed:  Write down your questions so you remember to ask them during your visits  © Copyright [address] Information is for End User's use only and may not be sold, redistributed or otherwise used for commercial purposes  All illustrations and images included in CareNotes® are the copyrighted property of A D A M , Inc  or [address] [place], [place] [address]citlaly   The above information is an  only  It is not intended as medical advice for individual conditions or treatments  Talk to your doctor, nurse or pharmacist before following any medical regimen to see if it is safe and effective for you

## 2021-05-20 ENCOUNTER — TELEPHONE (OUTPATIENT)
Dept: NEUROLOGY | Facility: CLINIC | Age: 56
End: 2021-05-20

## 2021-05-20 NOTE — TELEPHONE ENCOUNTER
Called and spoke to patients caregiver, Arlyn Zayas  States patient was better 5/19/2021  tremors are slightly improved  More noticeable when patient is idle  When active and doing things tremors are not as noticeable  They have been pushing fluids on patient hoping that would help as well  Will keep appt for 5/25/2021 and notify office should they need anything prior

## 2021-05-20 NOTE — TELEPHONE ENCOUNTER
----- Message from Chinedu Bolaños sent at 5/19/2021  4:40 PM EDT -----  Thanks Dr Patricia Ledbetter! Dr Dain Lin is out of the office this week but I will have the nursing team call tomorrow to see how he is doing  Lyndsey/Calvin - can one of you give him a call tomorrow to see if he is doing any better?  ----- Message -----  From: Marlene Long MD  Sent: 5/18/2021   8:22 PM EDT  To: Chinedu Bolaños, MD Zeeshan Arreguin and Deng Jade was in the emergency room today with increased myoclonus  He has not had any larger seizures that we are aware of and the ED doc who is seeing him is the same who saw him in January and reports that he looks well  He is having intermittent single low amplitude (perhaps 6 inches) jerking or one UE, the other UE, or both  He is also having what I presume to be his more typical tardive sx  I did ask them to send off his med levels and they are checking some basic labs  I was not planning on increasing his Keppra again but I did see that in Keyur's note  Keyur, you already have an appointment with him in one week  It might be reasonable to ask nursing to call over tomorrow to get a sense of any improvement/resolution in his symptoms and at that point his Keppra could be adjusted if it would be appropriate      Thanks!    -Yvonne Carrero

## 2021-05-21 ENCOUNTER — TELEPHONE (OUTPATIENT)
Dept: NEUROLOGY | Facility: CLINIC | Age: 56
End: 2021-05-21

## 2021-05-21 NOTE — PROGRESS NOTES
Patient ID: Marcin Hicks is a 49 y o  male with cerebral palsy, generalized epilepsy, and behavioral disorder, who is returning to Neurology office for follow up of his seizures  Assessment/Plan:    No problem-specific Assessment & Plan notes found for this encounter  He will No follow-ups on file  Subjective:    HPI  Current seizure medications:  1  ***Depakote ER 500mg tablets (BRAND NECESSARY)- 1 tablet in the morning and 2 tablets at night  2  Levetiracetam 750mg tablets- 2 tablets twice per day  3  Lacosamide 200mg tablets- 1 tablet twice per day  Other medications as per Epic  Since his last visit, ***    Prior Seizure Medications: ***    His history was also obtained from his ***, who {was/were} present at today's visit  *** I personally reviewed his {test name} from ***    *** I reviewed ***, as documented in Epic/[facility], and summarized above  Objective: There were no vitals taken for this visit      Physical Exam    Neurological Exam      ROS:    Review of Systems    I personally reviewed the ROS that was entered by the medical assistant

## 2021-05-23 LAB — LEVETIRACETAM SERPL-MCNC: 25.3 UG/ML (ref 10–40)

## 2021-05-23 NOTE — ED PROVIDER NOTES
History  Chief Complaint   Patient presents with    Tremors     Patient reports tremors that started 2 days ago  57-year-old male presents emergency department accompanied by a caregiver with concern for increase in tremor like activity that began approximately 2 days ago  Patient was seen previously for this several months ago and was diagnosed with mild clonus after discussions with Neurology  Patient is on multiple psychiatric medications as well as multiple antiepileptic drugs  Has significant history for diabetes cerebral palsy seizure disorder  He appears well in no acute distress  Unable to obtain complete ROS secondary to the patient's limited verbal capacity  Prior to Admission Medications   Prescriptions Last Dose Informant Patient Reported? Taking?    Blood Glucose Monitoring Suppl (Mark Theodore) w/Device KIT  Care Giver No No   Sig: by Does not apply route 3 (three) times a day TEST BLOOD SUGARS THREE TIMES   Cholecalciferol (Vitamin D3) 25 MCG TABS   No No   Sig: TAKE 1 TABLET BY MOUTH DAILY (8AM) (DX: VITAMIN DAILY DEFICIENCY)   Depakote  MG 24 hr tablet   No No   Sig: TAKE 1 TABLET BY MOUTH IN THE MORNING *BRAND NECESSARY*;TAKE 2 TABLETS BY MOUTH AT BEDTIME   Humidifiers (Cool Mist Humidifier 1 gallon) MISC   No No   Sig: Use daily at bedtime   Insulin Pen Needle (PEN NEEDLES) 31G X 5 MM MISC  Care Giver No No   Sig: by Does not apply route daily   Multiple Vitamin (Daily-Reina) TABS   No No   Sig: TAKE 1 TABLET BY MOUTH DAILY (8AM)   OneTouch Delica Lancets 09H MISC  Care Giver No No   Sig: by Does not apply route daily TEST BLOOD SUGARS THREE TIMES DAILY   QUEtiapine (SEROquel XR) 200 mg 24 hr tablet  Care Giver No No   Sig: TAKE 1 TABLET BY MOUTH TWICE A DAY (8AM,2PM)   QUEtiapine (SEROquel XR) 400 mg 24 hr tablet  Care Giver No No   Sig: TAKE 1 TABLET BY MOUTH AT BEDTIME (8PM) (DX: ANTIPSYCHOTIC)   Stool Softener 100 MG capsule   No No   Sig: TAKE 1 CAPSULE BY MOUTH TWICE A DAY (8AM,8PM) (DX: CONSTIPATION)   Vimpat 200 MG tablet   No No   Sig: TAKE 1 TABLET BY MOUTH EVERY 12 HOURS (DX:EPILEPSY)   benzonatate (TESSALON) 200 MG capsule 2021 at Unknown time  No Yes   Sig: Take 1 capsule (200 mg total) by mouth 3 (three) times a day as needed for cough   benztropine (COGENTIN) 0 5 mg tablet   No No   Sig: TAKE 1 TABLET BY MOUTH TWICE A DAY (8AM,8PM)   calcium carbonate (OYSTER SHELL,OSCAL) 500 mg   No No   Sig: TAKE 1 TABLET BY MOUTH 3 TIMES A DAY (8AM,2PM,8PM)   gabapentin (NEURONTIN) 100 mg capsule  Care Giver Yes No   Sig: Take 100 mg by mouth 3 (three) times a day    glucose blood (OneTouch Verio) test strip  Care Giver No No   Sig: TEST BLOOD SUGARS THREE TIMES DAILY   hydrOXYzine pamoate (VISTARIL) 50 mg capsule  Care Giver No No   Sig: TAKE 1 CAPSULE BY MOUTH 3 TIMES A DAY   ibuprofen (MOTRIN) 600 mg tablet  Care Giver No No   Sig: Take 1 tablet (600 mg total) by mouth every 8 (eight) hours   ketoconazole (NIZORAL) 2 % shampoo  Care Giver No No   Sig: Wash hair and sideburns at least 3 times per week   levETIRAcetam (KEPPRA) 750 mg tablet   No No   Sig: Take 2 tablets (1,500 mg total) by mouth 2 (two) times a day   levothyroxine 125 mcg tablet   No No   Sig: TAKE 1 TABLET BY MOUTH DAILY (8AM) (DX: OTHER SPECIFIED HYPOTHYROIDISM)   lisinopril (ZESTRIL) 10 mg tablet   No No   Sig: TAKE 1 TABLET BY MOUTH DAILY (8AM) (DX: HYPERTENSION)   loratadine (CLARITIN) 10 mg tablet  Care Giver No No   Sig: One tab daily as needed for allergy symptoms   metFORMIN (GLUCOPHAGE) 1000 MG tablet   No No   Sig: TAKE 1 TABLET BY MOUTH TWICE A DAY WITH MEALS (8AM, 8PM) (DX: DIABETES MELLITUS)   polyethylene glycol (MIRALAX) 17 g packet  Care Giver No No   Sig: Take 17 g by mouth daily   simvastatin (ZOCOR) 40 mg tablet   No No   Sig: TAKE 1 TABLET BY MOUTH DAILY (DX: HYPERLIPIDEMIA)   sodium chloride (OCEAN) 0 65 % nasal spray   No No   Si spray into each nostril as needed for congestion temazepam (RESTORIL) 15 mg capsule   Yes No   Sig: Take 15 mg by mouth daily at bedtime as needed for sleep    temazepam (RESTORIL) 7 5 mg capsule  Care Giver Yes No   Sig: Take 15 mg by mouth daily at bedtime as needed for sleep   triamcinolone (KENALOG) 0 1 % cream  Care Giver No No   Sig: Apply topically 2 (two) times a day   vitamin B-12 (VITAMIN B-12) 1,000 mcg tablet  Care Giver No No   Sig: TAKE 1 TABLET BY MOUTH EVERY OTHER DAY (8AM)      Facility-Administered Medications: None       Past Medical History:   Diagnosis Date    ADRIANA (acute kidney injury) ([place] [address] ) 9/24/2019    [facility])     Depression     Diabetes ([place] [address] )     Disease of thyroid gland     Gait disorder     Hyperlipidemia     Hypertension     Kidney failure     Kidney stones     Osteoporosis     Psychiatric disorder     Scoliosis     Seizures ([place] [address] )     Thyroid disease     UTI (urinary tract infection)        Past Surgical History:   Procedure Laterality Date    APPENDECTOMY         History reviewed  No pertinent family history  I have reviewed and agree with the history as documented  E-Cigarette/Vaping    E-Cigarette Use Never User      E-Cigarette/Vaping Substances     Social History     Tobacco Use    Smoking status: Never Smoker    Smokeless tobacco: Never Used   Substance Use Topics    Alcohol use: Never     Frequency: Never    Drug use: No       Review of Systems   Unable to perform ROS: Patient nonverbal       Physical Exam  Physical Exam  Vitals signs and nursing note reviewed  Constitutional:       General: He is not in acute distress  Appearance: Normal appearance  He is well-developed  He is not ill-appearing  HENT:      Head: Normocephalic and atraumatic  Right Ear: External ear normal       Left Ear: External ear normal       Nose: Nose normal    Eyes:      Pupils: Pupils are equal, round, and reactive to light  Neck:      Musculoskeletal: Normal range of motion and neck supple  Cardiovascular:      Rate and Rhythm: Normal rate and regular rhythm  Heart sounds: Normal heart sounds  No murmur  No friction rub  No gallop  Pulmonary:      Effort: Pulmonary effort is normal  No respiratory distress  Breath sounds: Normal breath sounds  No stridor  No wheezing or rales  Abdominal:      General: Bowel sounds are normal  There is no distension  Palpations: Abdomen is soft  Tenderness: There is no abdominal tenderness  There is no guarding  Musculoskeletal: Normal range of motion  General: No tenderness  Comments: Occasional motor tic noted chapped and less than 2-3 times per minute  Appears to be low amplitude primarily of the upper extremities  Usually unilateral   Patient states that he is not in any pain  He indicates that he feels well  Skin:     General: Skin is warm  Capillary Refill: Capillary refill takes less than 2 seconds  Neurological:      Mental Status: He is alert and oriented to person, place, and time  Psychiatric:         Behavior: Behavior is cooperative           Vital Signs  ED Triage Vitals   Temperature Pulse Respirations Blood Pressure SpO2   05/18/21 1902 05/18/21 1902 05/18/21 1902 05/18/21 2100 05/18/21 1902   97 9 °F (36 6 °C) 82 16 170/84 95 %      Temp Source Heart Rate Source Patient Position - Orthostatic VS BP Location FiO2 (%)   05/18/21 1902 05/18/21 1902 -- -- --   Tympanic Monitor         Pain Score       --                  Vitals:    05/18/21 1902 05/18/21 2100   BP:  170/84   Pulse: 82 75         Visual Acuity      ED Medications  Medications   sodium chloride 0 9 % bolus 500 mL (0 mL Intravenous Stopped 5/18/21 2123)       Diagnostic Studies  Results Reviewed     Procedure Component Value Units Date/Time    Ammonia [[id number]]  (Normal) Collected: 05/18/21 2004    Lab Status: Final result Specimen: Blood from Arm, Left Updated: 05/18/21 2101     Ammonia 63 umol/L     Narrative:      SPECIMEN NOT RECEIVED ON ICE  STERLING BENEDICT,ER NOTIFIED    Comprehensive metabolic panel [[id number]]  (Abnormal) Collected: 05/18/21 2004    Lab Status: Final result Specimen: Blood from Arm, Left Updated: 05/18/21 2051     Sodium 139 mmol/L      Potassium 4 1 mmol/L      Chloride 100 mmol/L      CO2 29 mmol/L      ANION GAP 10 mmol/L      BUN 21 mg/dL      Creatinine 0 63 mg/dL      Glucose 129 mg/dL      Calcium 9 5 mg/dL      AST 21 U/L      ALT 14 U/L      Alkaline Phosphatase 32 U/L      Total Protein 7 1 g/dL      Albumin 4 2 g/dL      Total Bilirubin 0 20 mg/dL      eGFR 112 ml/min/1 73sq m     Narrative:      [place] guidelines for Chronic Kidney Disease (CKD):     Stage 1 with normal or high GFR (GFR > 90 mL/min/1 73 square meters)    Stage 2 Mild CKD (GFR = 60-89 mL/min/1 73 square meters)    Stage 3A Moderate CKD (GFR = 45-59 mL/min/1 73 square meters)    Stage 3B Moderate CKD (GFR = 30-44 mL/min/1 73 square meters)    Stage 4 Severe CKD (GFR = 15-29 mL/min/1 73 square meters)    Stage 5 End Stage CKD (GFR <15 mL/min/1 73 square meters)  Note: GFR calculation is accurate only with a steady state creatinine    Valproic acid level, total [[id number]]  (Normal) Collected: 05/18/21 2004    Lab Status: Final result Specimen: Blood from Arm, Left Updated: 05/18/21 2050     Valproic Acid, Total 80 2 ug/mL     CK Total with Reflex CKMB [[id number]]  (Normal) Collected: 05/18/21 2004    Lab Status: Final result Specimen: Blood from Arm, Left Updated: 05/18/21 2050     Total CK 80 U/L     Lactic acid, plasma [[id number]]  (Normal) Collected: 05/18/21 2004    Lab Status: Final result Specimen: Blood from Arm, Left Updated: 05/18/21 2049     LACTIC ACID 1 9 mmol/L     Narrative:      Result may be elevated if tourniquet was used during collection  Levetiracetam level [[id number]] Collected: 05/18/21 2004    Lab Status:  In process Specimen: Blood from Arm, Left Updated: 05/18/21 2035    CBC and differential [[id number]]  (Abnormal) Collected: 05/18/21 2004    Lab Status: Final result Specimen: Blood from Arm, Left Updated: 05/18/21 2034     WBC 4 80 Thousand/uL      RBC 4 02 Million/uL      Hemoglobin 12 8 g/dL      Hematocrit 38 5 %      MCV 96 fL      MCH 31 9 pg      MCHC 33 4 g/dL      RDW 13 7 %      MPV 10 2 fL      Platelets 596 Thousands/uL      Neutrophils Relative 27 %      Lymphocytes Relative 54 %      Monocytes Relative 15 %      Eosinophils Relative 4 %      Basophils Relative 1 %      Neutrophils Absolute 1 30 Thousands/µL      Lymphocytes Absolute 2 50 Thousands/µL      Monocytes Absolute 0 70 Thousand/µL      Eosinophils Absolute 0 20 Thousand/µL      Basophils Absolute 0 10 Thousands/µL                  No orders to display              Procedures  Procedures         ED Course  ED Course as of May 23 1545   Tue May 18, 2021   1945 Given the complexity of this patient case was discussed with Neurology prior to further evaluation  Lab studies ordered per neurology Dr Ashok Hernandez recommendations  Neurology to perform chart review  1946 Based on my description of the patient and the patient's physical exam and reported symptoms Dr Tiffani Benton of neurology does not believe the symptoms to be of malignant etiology  Will evaluate labs and re-evaluate patient  2104 Labs unremarkable  No acute findings  Patient does have established follow-up with his epileptologist in 1 week  MDM  Number of Diagnoses or Management Options  Myoclonus:   Diagnosis management comments: I discussed this case at length with neurology Dr Tiffani Benton  Labs evaluated  Patient does have upcoming appointment with epileptologist   No medication adjustments noted at this time  As the patient is not in any distress and his symptoms are so minor they do not appear to be a malignant etiology  Patient and caregiver reassured    Patient educated regarding their diagnosis and given return and follow-up instructions  Patient was advised to returned to the ED with worsening symptoms or concerns  Patient is understanding of and in agreement with the treatment plan  There are no questions at the time of discharge  Amount and/or Complexity of Data Reviewed  Clinical lab tests: ordered and reviewed    Risk of Complications, Morbidity, and/or Mortality  Presenting problems: moderate  Diagnostic procedures: low  Management options: low    Patient Progress  Patient progress: stable      Disposition  Final diagnoses:   Myoclonus     Time reflects when diagnosis was documented in both MDM as applicable and the Disposition within this note     Time User Action Codes Description Comment    5/18/2021  9:08 PM Dalyjerilillian Miguelito Moreno [G25 3] Myoclonus       ED Disposition     ED Disposition Condition Date/Time Comment    Discharge Stable Tue May 18, 2021  9:08 PM Elza Oconnell discharge to home/self care              Follow-up Information     Follow up With Specialties Details Why Contact Info    Hans Amezquita MD Neurology   [address]  [address] [facility]  [phone]            Discharge Medication List as of 5/18/2021  9:11 PM      CONTINUE these medications which have NOT CHANGED    Details   benzonatate (TESSALON) 200 MG capsule Take 1 capsule (200 mg total) by mouth 3 (three) times a day as needed for cough, Starting Tue 11/10/2020, Normal      benztropine (COGENTIN) 0 5 mg tablet TAKE 1 TABLET BY MOUTH TWICE A DAY (8AM,8PM), Normal      Blood Glucose Monitoring Suppl [place]) w/Device KIT by Does not apply route 3 (three) times a day TEST BLOOD SUGARS THREE TIMES, Starting Fri 7/31/2020, Normal      calcium carbonate (OYSTER SHELL,OSCAL) 500 mg TAKE 1 TABLET BY MOUTH 3 TIMES A DAY (8AM,2PM,8PM), Normal      Cholecalciferol (Vitamin D3) 25 MCG TABS TAKE 1 TABLET BY MOUTH DAILY (8AM) (DX: VITAMIN DAILY DEFICIENCY), Normal      Depakote  MG 24 hr tablet TAKE 1 TABLET BY MOUTH IN THE MORNING *BRAND NECESSARY*;TAKE 2 TABLETS BY MOUTH AT BEDTIME, Normal      gabapentin (NEURONTIN) 100 mg capsule Take 100 mg by mouth 3 (three) times a day , Starting Fri 6/26/2020, Historical Med      glucose blood (OneTouch Verio) test strip TEST BLOOD SUGARS THREE TIMES DAILY, Normal      Humidifiers (Cool Mist Humidifier 1 gallon) MISC Use daily at bedtime, Starting Tue 11/10/2020, Until Wed 2/17/2021, Normal      hydrOXYzine pamoate (VISTARIL) 50 mg capsule TAKE 1 CAPSULE BY MOUTH 3 TIMES A DAY, Normal      ibuprofen (MOTRIN) 600 mg tablet Take 1 tablet (600 mg total) by mouth every 8 (eight) hours, Starting Fri 7/31/2020, Normal      Insulin Pen Needle (PEN NEEDLES) 31G X 5 MM MISC by Does not apply route daily, Starting Mon 7/13/2020, Normal      ketoconazole (NIZORAL) 2 % shampoo Wash hair and sideburns at least 3 times per week, Normal      levETIRAcetam (KEPPRA) 750 mg tablet Take 2 tablets (1,500 mg total) by mouth 2 (two) times a day, Starting Tue 1/26/2021, Normal      levothyroxine 125 mcg tablet TAKE 1 TABLET BY MOUTH DAILY (8AM) (DX: OTHER SPECIFIED HYPOTHYROIDISM), Normal      lisinopril (ZESTRIL) 10 mg tablet TAKE 1 TABLET BY MOUTH DAILY (8AM) (DX: HYPERTENSION), Normal      loratadine (CLARITIN) 10 mg tablet One tab daily as needed for allergy symptoms, Normal      metFORMIN (GLUCOPHAGE) 1000 MG tablet TAKE 1 TABLET BY MOUTH TWICE A DAY WITH MEALS (8AM, 8PM) (DX: DIABETES MELLITUS), Normal      Multiple Vitamin (Daily-Reina) TABS TAKE 1 TABLET BY MOUTH DAILY (8AM), Normal      OneTouch Delica Lancets 77H MISC by Does not apply route daily TEST BLOOD SUGARS THREE TIMES DAILY, Starting Fri 7/31/2020, Normal      polyethylene glycol (MIRALAX) 17 g packet Take 17 g by mouth daily, Starting Thu 6/18/2020, Normal      !! QUEtiapine (SEROquel XR) 200 mg 24 hr tablet TAKE 1 TABLET BY MOUTH TWICE A DAY (8AM,2PM), Normal      !!  QUEtiapine (SEROquel XR) 400 mg 24 hr tablet TAKE 1 TABLET BY MOUTH AT BEDTIME (8PM) (DX: ANTIPSYCHOTIC), Normal      simvastatin (ZOCOR) 40 mg tablet TAKE 1 TABLET BY MOUTH DAILY (DX: HYPERLIPIDEMIA), Normal      sodium chloride (OCEAN) 0 65 % nasal spray 1 spray into each nostril as needed for congestion, Starting Tue 11/10/2020, Until Wed 2/17/2021, Normal      Stool Softener 100 MG capsule TAKE 1 CAPSULE BY MOUTH TWICE A DAY (8AM,8PM) (DX: CONSTIPATION), Normal      !! temazepam (RESTORIL) 15 mg capsule Take 15 mg by mouth daily at bedtime as needed for sleep , Starting Mon 2/8/2021, Historical Med      !! temazepam (RESTORIL) 7 5 mg capsule Take 15 mg by mouth daily at bedtime as needed for sleep, Historical Med      triamcinolone (KENALOG) 0 1 % cream Apply topically 2 (two) times a day, Starting Mon 7/13/2020, Normal      Vimpat 200 MG tablet TAKE 1 TABLET BY MOUTH EVERY 12 HOURS (DX:EPILEPSY), Normal      vitamin B-12 (VITAMIN B-12) 1,000 mcg tablet TAKE 1 TABLET BY MOUTH EVERY OTHER DAY (8AM), Normal       !! - Potential duplicate medications found  Please discuss with provider  No discharge procedures on file      PDMP Review       Value Time User    PDMP Reviewed  Yes 3/19/2021  8:26 AM HUNTER Stephenson          ED Provider  Electronically Signed by           Tory Hagan PA-C  05/23/21 4059

## 2021-05-24 LAB
LEFT EYE DIABETIC RETINOPATHY: NORMAL
RIGHT EYE DIABETIC RETINOPATHY: NORMAL

## 2021-05-25 ENCOUNTER — TELEMEDICINE (OUTPATIENT)
Dept: NEUROLOGY | Facility: CLINIC | Age: 56
End: 2021-05-25
Payer: MEDICARE

## 2021-05-25 DIAGNOSIS — G40.309 GENERALIZED CONVULSIVE EPILEPSY (HCC): Primary | Chronic | ICD-10-CM

## 2021-05-25 LAB
ATRIAL RATE: 77 BPM
P AXIS: 259 DEGREES
PR INTERVAL: 134 MS
QRS AXIS: 69 DEGREES
QRSD INTERVAL: 152 MS
QT INTERVAL: 404 MS
QTC INTERVAL: 457 MS
T WAVE AXIS: 30 DEGREES
VENTRICULAR RATE: 77 BPM

## 2021-05-25 PROCEDURE — 93010 ELECTROCARDIOGRAM REPORT: CPT | Performed by: INTERNAL MEDICINE

## 2021-05-25 PROCEDURE — 99214 OFFICE O/P EST MOD 30 MIN: CPT | Performed by: PSYCHIATRY & NEUROLOGY

## 2021-05-25 RX ORDER — CLONAZEPAM 0.25 MG/1
0.25 TABLET, ORALLY DISINTEGRATING ORAL AS NEEDED
Qty: 10 TABLET | Refills: 1 | Status: SHIPPED | OUTPATIENT
Start: 2021-05-25 | End: 2021-12-23 | Stop reason: HOSPADM

## 2021-05-25 NOTE — PROGRESS NOTES
Virtual Regular Visit      Assessment/Plan:    Patient ID: Ricky Pantoja is a 49 y o  male with cerebral palsy, generalized epilepsy, and behavioral disorder, who is returning for follow-up of his seizures  Assessment/Plan:    Generalized convulsive epilepsy ([address] )  He hasn't had any larger seizures, but did have several days of myoclonic jerking that occurred repetitively  These clusters of myoclonus are not happening very often at this point  It may be reasonable to try using a low dose of clonazepam as an abortive for when he has the cluster of myoclonus  -- he will continue Levetiracetam, Depakote, and Vimpat unchanged for now  -- I will have him start clonazepam 0 25 mg as needed for clusters of myoclonus  He can repeat this after 1 hour if the myoclonus continues  -- I did caution to not use the temazepam and the clonazepam together, since they have similar mechanisms and can make him overly sleepy if used together  He will Return in about 3 months (around 8/25/2021)  Reason for visit is   Chief Complaint   Patient presents with    Virtual Regular Visit        Encounter provider Oni Ellington MD    Provider located at [address] [place] [address] [facility]  [phone]      Recent Visits  No visits were found meeting these conditions  Showing recent visits within past 7 days and meeting all other requirements     Today's Visits  Date Type Provider Dept   05/25/21 Telemedicine Oni Ellington MD [facility] [facility] today's visits and meeting all other requirements     Future Appointments  No visits were found meeting these conditions  Showing future appointments within next 150 days and meeting all other requirements        The patient was identified by name and date of birth   Ricky Pantoja was informed that this is a telemedicine visit and that the visit is being conducted through [address] [employer] and patient was informed that this is a secure, HIPAA-compliant platform  He agrees to proceed     My office door was closed  No one else was in the room  He acknowledged consent and understanding of privacy and security of the video platform  The patient has agreed to participate and understands they can discontinue the visit at any time  Patient is aware this is a billable service  Subjective  Jane Palacios is a 49 y o  male     Current seizure medications:  1  Depakote ER 500mg tablets (BRAND NECESSARY)- 1 tablet in the morning and 2 tablets at night  2  Levetiracetam 750mg tablets- 2 tablets twice per day  3  Lacosamide 200mg tablets- 1 tablet twice per day  Other medications as per [facility]  Since his last visit, he has been doing okay  He did have some behavioral issues in his new living situation, but he has adjusted better lately  He has not had any seizures since his last visit  He did have one episode of repetitive myoclonic jerking  He would have quick jerk like movements of his arms  This happened for about 2 days, and then stopped  He will sometimes be awake through the night, which may have contributed to the event last week  His caregiver denies any definite side effects to his medications  Prior Medications: zonisamide    His history was also obtained from his caregiver, who was also present via [facility]  I reviewed notes from his recent ED visit and bloodwork from that visit including medication levels, as documented in [employer], and summarized above  Lajean Cassette       HPI     Past Medical History:   Diagnosis Date    [facility]) [facility] ) 9/24/2019    [facility]     Depression     Diabetes ([facility] )     Disease of thyroid gland     Gait disorder     Hyperlipidemia     Hypertension     Kidney failure     Kidney stones     Osteoporosis     Psychiatric disorder     Scoliosis     [facility]     Thyroid disease     UTI (urinary tract infection)        Past Surgical History:   Procedure Laterality Date    APPENDECTOMY         Current Outpatient Medications   Medication Sig Dispense Refill    benzonatate (TESSALON) 200 MG capsule Take 1 capsule (200 mg total) by mouth 3 (three) times a day as needed for cough 20 capsule 0    benztropine (COGENTIN) 0 5 mg tablet TAKE 1 TABLET BY MOUTH TWICE A DAY (8AM,8PM) 56 tablet 4    Blood Glucose Monitoring Suppl (ONETOUCH VERIO) w/Device KIT by Does not apply route 3 (three) times a day TEST BLOOD SUGARS THREE TIMES 1 kit 0    calcium carbonate (OYSTER SHELL,OSCAL) 500 mg TAKE 1 TABLET BY MOUTH 3 TIMES A DAY (8AM,2PM,8PM) 84 tablet 4    Cholecalciferol (Vitamin D3) 25 MCG TABS TAKE 1 TABLET BY MOUTH DAILY (8AM) (DX: VITAMIN DAILY DEFICIENCY) 28 tablet 4    Depakote  MG 24 hr tablet TAKE 1 TABLET BY MOUTH IN THE MORNING *BRAND NECESSARY*;TAKE 2 TABLETS BY MOUTH AT BEDTIME 90 tablet 11    gabapentin (NEURONTIN) 100 mg capsule Take 100 mg by mouth 3 (three) times a day       glucose blood (OneTouch Verio) test strip TEST BLOOD SUGARS THREE TIMES DAILY 100 each 5    hydrOXYzine pamoate (VISTARIL) 50 mg capsule TAKE 1 CAPSULE BY MOUTH 3 TIMES A DAY 84 capsule 10    ibuprofen (MOTRIN) 600 mg tablet Take 1 tablet (600 mg total) by mouth every 8 (eight) hours 60 tablet 0    Insulin Pen Needle (PEN NEEDLES) 31G X 5 MM MISC by Does not apply route daily 100 each 5    ketoconazole (NIZORAL) 2 % shampoo Wash hair and sideburns at least 3 times per week 120 mL 3    levETIRAcetam (KEPPRA) 750 mg tablet Take 2 tablets (1,500 mg total) by mouth 2 (two) times a day 120 tablet 11    levothyroxine 125 mcg tablet TAKE 1 TABLET BY MOUTH DAILY (8AM) (DX: OTHER SPECIFIED HYPOTHYROIDISM) 28 tablet 4    lisinopril (ZESTRIL) 10 mg tablet TAKE 1 TABLET BY MOUTH DAILY (8AM) (DX: HYPERTENSION) 28 tablet 4    loratadine (CLARITIN) 10 mg tablet One tab daily as needed for allergy symptoms 90 tablet 0    metFORMIN (GLUCOPHAGE) 1000 MG tablet TAKE 1 TABLET BY MOUTH TWICE A DAY WITH MEALS (8AM, 8PM) (DX: DIABETES MELLITUS) 56 tablet 4    Multiple Vitamin (Daily-Reina) TABS TAKE 1 TABLET BY MOUTH DAILY (8AM) 28 tablet 4    OneTouch Delica Lancets 77N MISC by Does not apply route daily TEST BLOOD SUGARS THREE TIMES DAILY 100 each 2    polyethylene glycol (MIRALAX) 17 g packet Take 17 g by mouth daily 14 each 8    QUEtiapine (SEROquel XR) 200 mg 24 hr tablet TAKE 1 TABLET BY MOUTH TWICE A DAY (8AM,2PM) 16 tablet 10    QUEtiapine (SEROquel XR) 400 mg 24 hr tablet TAKE 1 TABLET BY MOUTH AT BEDTIME (8PM) (DX: ANTIPSYCHOTIC) 28 tablet 10    simvastatin (ZOCOR) 40 mg tablet TAKE 1 TABLET BY MOUTH DAILY (DX: HYPERLIPIDEMIA) 28 tablet 4    Stool Softener 100 MG capsule TAKE 1 CAPSULE BY MOUTH TWICE A DAY (8AM,8PM) (DX: CONSTIPATION) 56 capsule 4    temazepam (RESTORIL) 15 mg capsule Take 15 mg by mouth daily at bedtime as needed for sleep       temazepam (RESTORIL) 7 5 mg capsule Take 15 mg by mouth daily at bedtime as needed for sleep      triamcinolone (KENALOG) 0 1 % cream Apply topically 2 (two) times a day 30 g 0    Vimpat 200 MG tablet TAKE 1 TABLET BY MOUTH EVERY 12 HOURS (DX:EPILEPSY) 60 tablet 2    vitamin B-12 (VITAMIN B-12) 1,000 mcg tablet TAKE 1 TABLET BY MOUTH EVERY OTHER DAY (8AM) 14 tablet 10    clonazePAM (KlonoPIN) 0 25 MG disintegrating tablet Take 1 tablet (0 25 mg total) by mouth as needed (for clusters of myoclonus  can repeat once after 1 hour if myoclonus continues  ) 10 tablet 1    Humidifiers (Cool Mist Humidifier 1 gallon) MISC Use daily at bedtime 1 each 0    sodium chloride (OCEAN) 0 65 % nasal spray 1 spray into each nostril as needed for congestion 15 mL 1     No current facility-administered medications for this visit           Allergies   Allergen Reactions    Erythromycin Other (See Comments)     Unknown per pt    Penicillins Other (See Comments)     Unknown per pt Review of Systems   Constitutional: Negative  Negative for appetite change and fever  HENT: Negative  Negative for hearing loss, tinnitus, trouble swallowing and voice change  Eyes: Negative  Negative for photophobia and pain  Respiratory: Negative  Negative for shortness of breath  Cardiovascular: Negative  Negative for palpitations  Gastrointestinal: Negative  Negative for nausea and vomiting  Endocrine: Negative  Negative for cold intolerance  Genitourinary: Negative  Negative for dysuria, frequency and urgency  Musculoskeletal: Negative  Negative for myalgias and neck pain  Skin: Negative  Negative for rash  Neurological: Positive for tremors (lasting 2 days)  Negative for dizziness, seizures, syncope, facial asymmetry, speech difficulty, weakness, light-headedness, numbness and headaches  Hematological: Negative  Does not bruise/bleed easily  Psychiatric/Behavioral: Negative  Negative for confusion, hallucinations and sleep disturbance  All other systems reviewed and are negative  Video Exam    There were no vitals filed for this visit  Physical Exam     I spent 25 minutes directly with the patient during this visit      Chinedu Alok acknowledges that he has consented to an online visit or consultation  He understands that the online visit is based solely on information provided by him, and that, in the absence of a face-to-face physical evaluation by the physician, the diagnosis he receives is both limited and provisional in terms of accuracy and completeness  This is not intended to replace a full medical face-to-face evaluation by the physician  Shawnee Mendes understands and accepts these terms

## 2021-05-25 NOTE — PATIENT INSTRUCTIONS
-- continue his Depakote, Levetiracetam, and Vimpat unchanged  -- I will have him start clonazepam 0 25 mg as needed for clusters of myoclonic jerking  You can repeat this after 1 hour if the jerking continues

## 2021-05-25 NOTE — ASSESSMENT & PLAN NOTE
He hasn't had any larger seizures, but did have several days of myoclonic jerking that occurred repetitively  These clusters of myoclonus are not happening very often at this point  It may be reasonable to try using a low dose of clonazepam as an abortive for when he has the cluster of myoclonus  -- he will continue Levetiracetam, Depakote, and Vimpat unchanged for now  -- I will have him start clonazepam 0 25 mg as needed for clusters of myoclonus  He can repeat this after 1 hour if the myoclonus continues  -- I did caution to not use the temazepam and the clonazepam together, since they have similar mechanisms and can make him overly sleepy if used together

## 2021-06-21 RX ORDER — TEMAZEPAM 15 MG/1
15 CAPSULE ORAL
Qty: 30 CAPSULE | Refills: 0 | OUTPATIENT
Start: 2021-06-21

## 2021-06-21 NOTE — TELEPHONE ENCOUNTER
Patient needs to call [facility]  for refill  Medication was not ordered by us  Please let patient know    Thank No

## 2021-07-14 DIAGNOSIS — G23.2 PARKINSON'S PLUS SYNDROME (HCC): ICD-10-CM

## 2021-07-14 DIAGNOSIS — G40.309 GENERALIZED CONVULSIVE EPILEPSY (HCC): ICD-10-CM

## 2021-07-14 DIAGNOSIS — E55.9 VITAMIN D DEFICIENCY: ICD-10-CM

## 2021-07-15 RX ORDER — BENZTROPINE MESYLATE 0.5 MG/1
TABLET ORAL
Qty: 56 TABLET | Refills: 4 | Status: SHIPPED | OUTPATIENT
Start: 2021-07-15

## 2021-07-15 RX ORDER — MELATONIN
Qty: 28 TABLET | Refills: 4 | Status: SHIPPED | OUTPATIENT
Start: 2021-07-15 | End: 2022-02-09 | Stop reason: SDUPTHER

## 2021-07-15 RX ORDER — LACOSAMIDE 200 MG/1
TABLET, FILM COATED ORAL
Qty: 60 TABLET | Refills: 2 | Status: SHIPPED | OUTPATIENT
Start: 2021-07-15 | End: 2021-11-04

## 2021-07-26 DIAGNOSIS — E53.8 B12 DEFICIENCY: ICD-10-CM

## 2021-07-26 RX ORDER — LANOLIN ALCOHOL/MO/W.PET/CERES
CREAM (GRAM) TOPICAL
Qty: 14 TABLET | Refills: 4 | Status: SHIPPED | OUTPATIENT
Start: 2021-07-26 | End: 2022-02-15

## 2021-08-19 ENCOUNTER — TELEPHONE (OUTPATIENT)
Dept: NEUROLOGY | Facility: CLINIC | Age: 56
End: 2021-08-19

## 2021-08-19 NOTE — TELEPHONE ENCOUNTER
Patient's sister calling to schedule a virtual appointment  Scheduled for a video appointment on 11/11

## 2021-08-23 ENCOUNTER — OFFICE VISIT (OUTPATIENT)
Dept: UROLOGY | Facility: CLINIC | Age: 56
End: 2021-08-23
Payer: MEDICARE

## 2021-08-23 VITALS
WEIGHT: 147 LBS | SYSTOLIC BLOOD PRESSURE: 124 MMHG | BODY MASS INDEX: 19.91 KG/M2 | HEIGHT: 72 IN | DIASTOLIC BLOOD PRESSURE: 80 MMHG

## 2021-08-23 DIAGNOSIS — Z12.5 PROSTATE CANCER SCREENING: Primary | ICD-10-CM

## 2021-08-23 PROCEDURE — 99203 OFFICE O/P NEW LOW 30 MIN: CPT | Performed by: NURSE PRACTITIONER

## 2021-08-23 NOTE — PROGRESS NOTES
Assessment and plan:     Prostate cancer screening  PSA in 2 weeks  Follow up in 1 year for routine prostate cancer screening        HUNTER Mandel    History of Present Illness     Monico Turpin is a 49 y o  New patient who presents for routine prostate cancer screening  He presents with [facility]  where he resides  He does have a history of cerebral palsy, generalized epilepsy and behavioral disorder  His last PSA was 1 5 in 2020  Family history of prostate cancer:  Unknown, he was adopted    He is unable provide a good history as far as voiding symptoms  Staff reports that he does not appear to be straining to urinate  But he is not thoroughly watch for this  Laboratory     Lab Results   Component Value Date    BUN 21 05/18/2021    CREATININE 0 63 (L) 05/18/2021       No components found for: GFR    Lab Results   Component Value Date    CALCIUM 9 5 05/18/2021    K 4 1 05/18/2021    CO2 29 05/18/2021     05/18/2021       Lab Results   Component Value Date    WBC 4 80 05/18/2021    HGB 12 8 (L) 05/18/2021    HCT 38 5 (L) 05/18/2021    MCV 96 05/18/2021     05/18/2021       Lab Results   Component Value Date    PSA 1 5 02/25/2020    PSA 0 7 07/31/2019    PSA 1 4 02/13/2018       No results found for this or any previous visit (from the past 1 hour(s))  @RESULT(URINEMICROSCOPIC)@    @RESULT(URINECULTURE)@    Radiology       Review of Systems     Review of Systems   Constitutional: Negative for activity change, appetite change, chills, fatigue, fever and unexpected weight change  HENT: Negative for facial swelling  Eyes: Negative for discharge  Respiratory: Negative  Negative for cough and shortness of breath  Cardiovascular: Negative for chest pain and leg swelling  Gastrointestinal: Positive for constipation  Negative for abdominal distention, abdominal pain, diarrhea, nausea and vomiting  Endocrine: Negative  Genitourinary: Negative    Negative for decreased urine volume, difficulty urinating, dysuria, enuresis, flank pain, frequency, genital sores, hematuria and urgency  Musculoskeletal: Negative for back pain and myalgias  Skin: Negative for pallor and rash  Allergic/Immunologic: Negative  Negative for immunocompromised state  Neurological: Negative for facial asymmetry and speech difficulty  Psychiatric/Behavioral: Positive for behavioral problems  Negative for agitation and confusion  Can be agressive     Allergies     Allergies   Allergen Reactions    Erythromycin Other (See Comments)     Unknown per pt    Penicillins Other (See Comments)     Unknown per pt       Physical Exam     Physical Exam  Constitutional:       General: He is not in acute distress  Appearance: Normal appearance  He is not ill-appearing, toxic-appearing or diaphoretic  HENT:      Head: Normocephalic and atraumatic  Eyes:      General: No scleral icterus  Cardiovascular:      Rate and Rhythm: Normal rate  Pulmonary:      Effort: Pulmonary effort is normal  No respiratory distress  Abdominal:      General: Abdomen is flat  There is no distension  Palpations: Abdomen is soft  Tenderness: There is no abdominal tenderness  There is no guarding or rebound  Genitourinary:     Penis: Circumcised  No hypospadias, erythema, tenderness, discharge, swelling or lesions  Testes:         Right: Tenderness or swelling not present  Right testis is descended  Left: Tenderness or swelling not present  Left testis is descended  Epididymis:      Right: Not inflamed  No tenderness  Left: Not inflamed  No tenderness  Comments: Prostate smooth nontender without appreciable nodules  Musculoskeletal:         General: No swelling  Cervical back: Normal range of motion  Right lower leg: No edema  Left lower leg: No edema  Skin:     General: Skin is warm and dry  Coloration: Skin is not jaundiced or pale  Findings: No rash  Neurological:      General: No focal deficit present  Mental Status: He is alert and oriented to person, place, and time  Gait: Gait normal    Psychiatric:         Mood and Affect: Mood normal          Vital Signs     Vitals:    08/23/21 1358   BP: 124/80   Weight: 66 7 kg (147 lb)   Height: 6' (1 829 m)       Current Medications       Current Outpatient Medications:     benzonatate (TESSALON) 200 MG capsule, Take 1 capsule (200 mg total) by mouth 3 (three) times a day as needed for cough, Disp: 20 capsule, Rfl: 0    benztropine (COGENTIN) 0 5 mg tablet, TAKE 1 TABLET BY MOUTH TWICE A DAY (8AM,8PM), Disp: 56 tablet, Rfl: 4    Blood Glucose Monitoring Suppl (ONETOUCH VERIO) w/Device KIT, by Does not apply route 3 (three) times a day TEST BLOOD SUGARS THREE TIMES, Disp: 1 kit, Rfl: 0    calcium carbonate (OYSTER SHELL,OSCAL) 500 mg, TAKE 1 TABLET BY MOUTH 3 TIMES A DAY (8AM,2PM,8PM), Disp: 84 tablet, Rfl: 4    cholecalciferol (VITAMIN D3) 1,000 units tablet, TAKE 1 TABLET BY MOUTH DAILY (8AM) (DX: VITAMIN DAILY DEFICIENCY), Disp: 28 tablet, Rfl: 4    clonazePAM (KlonoPIN) 0 25 MG disintegrating tablet, Take 1 tablet (0 25 mg total) by mouth as needed (for clusters of myoclonus   can repeat once after 1 hour if myoclonus continues ), Disp: 10 tablet, Rfl: 1    Depakote  MG 24 hr tablet, TAKE 1 TABLET BY MOUTH IN THE MORNING *BRAND NECESSARY*;TAKE 2 TABLETS BY MOUTH AT BEDTIME, Disp: 90 tablet, Rfl: 11    gabapentin (NEURONTIN) 100 mg capsule, Take 100 mg by mouth 3 (three) times a day , Disp: , Rfl:     glucose blood (OneTouch Verio) test strip, TEST BLOOD SUGARS THREE TIMES DAILY, Disp: 100 each, Rfl: 5    hydrOXYzine pamoate (VISTARIL) 50 mg capsule, TAKE 1 CAPSULE BY MOUTH 3 TIMES A DAY, Disp: 84 capsule, Rfl: 10    ibuprofen (MOTRIN) 600 mg tablet, Take 1 tablet (600 mg total) by mouth every 8 (eight) hours, Disp: 60 tablet, Rfl: 0    Insulin Pen Needle (PEN NEEDLES) 31G X 5 MM MISC, by Does not apply route daily, Disp: 100 each, Rfl: 5    ketoconazole (NIZORAL) 2 % shampoo, Wash hair and sideburns at least 3 times per week, Disp: 120 mL, Rfl: 3    levETIRAcetam (KEPPRA) 750 mg tablet, Take 2 tablets (1,500 mg total) by mouth 2 (two) times a day, Disp: 120 tablet, Rfl: 11    levothyroxine 125 mcg tablet, TAKE 1 TABLET BY MOUTH DAILY (8AM) (DX: OTHER SPECIFIED HYPOTHYROIDISM), Disp: 28 tablet, Rfl: 4    lisinopril (ZESTRIL) 10 mg tablet, TAKE 1 TABLET BY MOUTH DAILY (8AM) (DX: HYPERTENSION), Disp: 28 tablet, Rfl: 4    loratadine (CLARITIN) 10 mg tablet, One tab daily as needed for allergy symptoms, Disp: 90 tablet, Rfl: 0    metFORMIN (GLUCOPHAGE) 1000 MG tablet, TAKE 1 TABLET BY MOUTH TWICE A DAY WITH MEALS (8AM, 8PM) (DX: DIABETES MELLITUS), Disp: 56 tablet, Rfl: 4    Multiple Vitamin (Daily-Reina) TABS, TAKE 1 TABLET BY MOUTH DAILY (8AM), Disp: 28 tablet, Rfl: 4    OneTouch Delica Lancets 57K MISC, by Does not apply route daily TEST BLOOD SUGARS THREE TIMES DAILY, Disp: 100 each, Rfl: 2    polyethylene glycol (MIRALAX) 17 g packet, Take 17 g by mouth daily, Disp: 14 each, Rfl: 8    QUEtiapine (SEROquel XR) 200 mg 24 hr tablet, TAKE 1 TABLET BY MOUTH TWICE A DAY (8AM,2PM), Disp: 16 tablet, Rfl: 10    QUEtiapine (SEROquel XR) 400 mg 24 hr tablet, TAKE 1 TABLET BY MOUTH AT BEDTIME (8PM) (DX: ANTIPSYCHOTIC), Disp: 28 tablet, Rfl: 10    simvastatin (ZOCOR) 40 mg tablet, TAKE 1 TABLET BY MOUTH DAILY (DX: HYPERLIPIDEMIA), Disp: 28 tablet, Rfl: 4    Stool Softener 100 MG capsule, TAKE 1 CAPSULE BY MOUTH TWICE A DAY (8AM,8PM) (DX: CONSTIPATION), Disp: 56 capsule, Rfl: 4    temazepam (RESTORIL) 15 mg capsule, Take 15 mg by mouth daily at bedtime as needed for sleep , Disp: , Rfl:     temazepam (RESTORIL) 7 5 mg capsule, Take 15 mg by mouth daily at bedtime as needed for sleep, Disp: , Rfl:     triamcinolone (KENALOG) 0 1 % cream, Apply topically 2 (two) times a day, Disp: 30 g, Rfl: 0    Vimpat 200 MG tablet, TAKE 1 TABLET BY MOUTH EVERY 12 HOURS (DX: EPILEPSY), Disp: 60 tablet, Rfl: 2    vitamin B-12 (VITAMIN B-12) 1,000 mcg tablet, TAKE 1 TABLET BY MOUTH EVERY OTHER DAY (8AM) (DX: DEFICIENCY OF OTHER SPECIFIED B GROUP VITAMINS), Disp: 14 tablet, Rfl: 4    Humidifiers (Cool Mist Humidifier 1 gallon) MISC, Use daily at bedtime, Disp: 1 each, Rfl: 0    sodium chloride (OCEAN) 0 65 % nasal spray, 1 spray into each nostril as needed for congestion, Disp: 15 mL, Rfl: 1    Active Problems     Patient Active Problem List   Diagnosis    Cataract    Cerebral palsy (HCC)    Type 2 diabetes mellitus without complication, without long-term current use of insulin ([facility]    [facility]    Hyperlipidemia    Essential hypertension    Acquired hypothyroidism    Osteoporosis    Scoliosis    Vitamin B12 deficiency    Vitamin D deficiency    Seborrheic dermatitis of scalp    Headache    Nearsightedness    Behavior concern in adult    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[facility]    Thrombocytopathia ([facility]    Prostate cancer screening       Past Medical History     Past Medical History:   Diagnosis Date    ADRIANA (acute kidney injury) ([facility] ) 9/24/2019    CP [facility]cere[facility]y) ([facility]     Depression     Diabetes ([facility]     Disease of thyroid gland     Gait disorder     Hyperlipidemia     Hypertension     Kidney failure     Kidney stones     Osteoporosis     Psychiatric disorder     Scoliosis     Seizures ([facility]     Thyroid disease     UTI (urinary tract infection)        Surgical History     Past Surgical History:   Procedure Laterality Date    APPENDECTOMY         Family History     History reviewed  No pertinent family history      Social History     Social History     Social History     Tobacco Use   Smoking Status Never Smoker   Smokeless Tobacco Never Used       Past Surgical History:   Procedure Laterality Date    APPENDECTOMY           The following portions of the patient's history were reviewed and updated as appropriate: allergies, current medications, past family history, past medical history, past social history, past surgical history and problem list    Please note :  Voice dictation software has been used to create this document  There may be inadvertent transcription errors      [id number] [id number] Otilio Li

## 2021-09-04 DIAGNOSIS — I10 HYPERTENSION, UNSPECIFIED TYPE: ICD-10-CM

## 2021-09-05 ENCOUNTER — HOSPITAL ENCOUNTER (INPATIENT)
Facility: HOSPITAL | Age: 56
LOS: 10 days | Discharge: HOME HEALTH | DRG: 853 | End: 2021-09-15
Attending: EMERGENCY MEDICINE | Admitting: INTERNAL MEDICINE
Payer: MEDICARE

## 2021-09-05 ENCOUNTER — APPOINTMENT (EMERGENCY)
Dept: CT IMAGING | Facility: HOSPITAL | Age: 56
DRG: 853 | End: 2021-09-05
Payer: MEDICARE

## 2021-09-05 ENCOUNTER — APPOINTMENT (EMERGENCY)
Dept: RADIOLOGY | Facility: HOSPITAL | Age: 56
DRG: 853 | End: 2021-09-05
Payer: MEDICARE

## 2021-09-05 DIAGNOSIS — A41.9 SEPSIS (HCC): Primary | ICD-10-CM

## 2021-09-05 DIAGNOSIS — L02.31 GLUTEAL ABSCESS: ICD-10-CM

## 2021-09-05 PROBLEM — R51.9 HEADACHE: Status: RESOLVED | Noted: 2019-08-01 | Resolved: 2021-09-05

## 2021-09-05 PROBLEM — Z12.5 PROSTATE CANCER SCREENING: Status: RESOLVED | Noted: 2021-08-23 | Resolved: 2021-09-05

## 2021-09-05 LAB
ALBUMIN SERPL BCP-MCNC: 3 G/DL (ref 3.5–5.7)
ALP SERPL-CCNC: 50 U/L (ref 40–150)
ALT SERPL W P-5'-P-CCNC: 30 U/L (ref 7–52)
ANION GAP SERPL CALCULATED.3IONS-SCNC: 13 MMOL/L (ref 4–13)
AST SERPL W P-5'-P-CCNC: 36 U/L (ref 13–39)
BACTERIA UR QL AUTO: ABNORMAL /HPF
BILIRUB SERPL-MCNC: 0.3 MG/DL (ref 0.2–1)
BILIRUB UR QL STRIP: NEGATIVE
BUN SERPL-MCNC: 13 MG/DL (ref 7–25)
CALCIUM ALBUM COR SERPL-MCNC: 9.3 MG/DL (ref 8.3–10.1)
CALCIUM SERPL-MCNC: 8.5 MG/DL (ref 8.6–10.5)
CHLORIDE SERPL-SCNC: 92 MMOL/L (ref 98–107)
CLARITY UR: CLEAR
CO2 SERPL-SCNC: 27 MMOL/L (ref 21–31)
COLOR UR: YELLOW
CREAT SERPL-MCNC: 0.67 MG/DL (ref 0.7–1.3)
ERYTHROCYTE [DISTWIDTH] IN BLOOD BY AUTOMATED COUNT: 13.4 % (ref 11.5–14.5)
GFR SERPL CREATININE-BSD FRML MDRD: 109 ML/MIN/1.73SQ M
GLUCOSE SERPL-MCNC: 141 MG/DL (ref 65–99)
GLUCOSE UR STRIP-MCNC: NEGATIVE MG/DL
HCT VFR BLD AUTO: 32.7 % (ref 42–47)
HGB BLD-MCNC: 10.8 G/DL (ref 14–18)
HGB UR QL STRIP.AUTO: ABNORMAL
KETONES UR STRIP-MCNC: ABNORMAL MG/DL
LACTATE SERPL-SCNC: 1.6 MMOL/L (ref 0.5–2)
LACTATE SERPL-SCNC: 3.2 MMOL/L (ref 0.5–2)
LEUKOCYTE ESTERASE UR QL STRIP: NEGATIVE
LIPASE SERPL-CCNC: 63 U/L (ref 11–82)
LYMPHOCYTES # BLD AUTO: 1.68 THOUSAND/UL (ref 0.6–4.47)
LYMPHOCYTES # BLD AUTO: 10 % (ref 20–51)
MCH RBC QN AUTO: 31.5 PG (ref 26–34)
MCHC RBC AUTO-ENTMCNC: 33.1 G/DL (ref 31–37)
MCV RBC AUTO: 95 FL (ref 81–99)
MONOCYTES # BLD AUTO: 1.85 THOUSAND/UL (ref 0–1.22)
MONOCYTES NFR BLD AUTO: 11 % (ref 4–12)
NEUTS BAND NFR BLD MANUAL: 4 % (ref 0–8)
NEUTS SEG # BLD: 13.27 THOUSAND/UL (ref 1.81–6.82)
NEUTS SEG NFR BLD AUTO: 75 % (ref 42–75)
NITRITE UR QL STRIP: POSITIVE
NON-SQ EPI CELLS URNS QL MICRO: ABNORMAL /HPF
PH UR STRIP.AUTO: 7 [PH]
PLATELET # BLD AUTO: 229 THOUSANDS/UL (ref 149–390)
PLATELET BLD QL SMEAR: ADEQUATE
PMV BLD AUTO: 8.1 FL (ref 8.6–11.7)
POTASSIUM SERPL-SCNC: 3.9 MMOL/L (ref 3.5–5.5)
PROT SERPL-MCNC: 5.9 G/DL (ref 6.4–8.9)
PROT UR STRIP-MCNC: NEGATIVE MG/DL
RBC # BLD AUTO: 3.43 MILLION/UL (ref 4.3–5.9)
RBC #/AREA URNS AUTO: ABNORMAL /HPF
RBC MORPH BLD: NORMAL
SARS-COV-2 RNA RESP QL NAA+PROBE: NEGATIVE
SODIUM SERPL-SCNC: 132 MMOL/L (ref 134–143)
SP GR UR STRIP.AUTO: <=1.005 (ref 1–1.03)
TOTAL CELLS COUNTED SPEC: 100
TROPONIN I SERPL-MCNC: <0.03 NG/ML
UROBILINOGEN UR QL STRIP.AUTO: 0.2 E.U./DL
VALPROATE SERPL-MCNC: 68.4 UG/ML (ref 50–125)
WBC # BLD AUTO: 16.8 THOUSAND/UL (ref 4.8–10.8)
WBC #/AREA URNS AUTO: ABNORMAL /HPF

## 2021-09-05 PROCEDURE — 85027 COMPLETE CBC AUTOMATED: CPT | Performed by: EMERGENCY MEDICINE

## 2021-09-05 PROCEDURE — 83605 ASSAY OF LACTIC ACID: CPT | Performed by: EMERGENCY MEDICINE

## 2021-09-05 PROCEDURE — 87205 SMEAR GRAM STAIN: CPT | Performed by: EMERGENCY MEDICINE

## 2021-09-05 PROCEDURE — U0003 INFECTIOUS AGENT DETECTION BY NUCLEIC ACID (DNA OR RNA); SEVERE ACUTE RESPIRATORY SYNDROME CORONAVIRUS 2 (SARS-COV-2) (CORONAVIRUS DISEASE [COVID-19]), AMPLIFIED PROBE TECHNIQUE, MAKING USE OF HIGH THROUGHPUT TECHNOLOGIES AS DESCRIBED BY CMS-2020-01-R: HCPCS | Performed by: EMERGENCY MEDICINE

## 2021-09-05 PROCEDURE — 87186 SC STD MICRODIL/AGAR DIL: CPT | Performed by: EMERGENCY MEDICINE

## 2021-09-05 PROCEDURE — 80164 ASSAY DIPROPYLACETIC ACD TOT: CPT | Performed by: EMERGENCY MEDICINE

## 2021-09-05 PROCEDURE — 87040 BLOOD CULTURE FOR BACTERIA: CPT | Performed by: EMERGENCY MEDICINE

## 2021-09-05 PROCEDURE — G1004 CDSM NDSC: HCPCS

## 2021-09-05 PROCEDURE — 85007 BL SMEAR W/DIFF WBC COUNT: CPT | Performed by: EMERGENCY MEDICINE

## 2021-09-05 PROCEDURE — U0005 INFEC AGEN DETEC AMPLI PROBE: HCPCS | Performed by: EMERGENCY MEDICINE

## 2021-09-05 PROCEDURE — 71045 X-RAY EXAM CHEST 1 VIEW: CPT

## 2021-09-05 PROCEDURE — 84484 ASSAY OF TROPONIN QUANT: CPT | Performed by: EMERGENCY MEDICINE

## 2021-09-05 PROCEDURE — 99285 EMERGENCY DEPT VISIT HI MDM: CPT

## 2021-09-05 PROCEDURE — 87070 CULTURE OTHR SPECIMN AEROBIC: CPT | Performed by: EMERGENCY MEDICINE

## 2021-09-05 PROCEDURE — 81001 URINALYSIS AUTO W/SCOPE: CPT | Performed by: EMERGENCY MEDICINE

## 2021-09-05 PROCEDURE — 96360 HYDRATION IV INFUSION INIT: CPT

## 2021-09-05 PROCEDURE — 36415 COLL VENOUS BLD VENIPUNCTURE: CPT | Performed by: EMERGENCY MEDICINE

## 2021-09-05 PROCEDURE — 83690 ASSAY OF LIPASE: CPT | Performed by: EMERGENCY MEDICINE

## 2021-09-05 PROCEDURE — 93005 ELECTROCARDIOGRAM TRACING: CPT

## 2021-09-05 PROCEDURE — 80053 COMPREHEN METABOLIC PANEL: CPT | Performed by: EMERGENCY MEDICINE

## 2021-09-05 PROCEDURE — 96361 HYDRATE IV INFUSION ADD-ON: CPT

## 2021-09-05 PROCEDURE — 72170 X-RAY EXAM OF PELVIS: CPT

## 2021-09-05 PROCEDURE — 99285 EMERGENCY DEPT VISIT HI MDM: CPT | Performed by: EMERGENCY MEDICINE

## 2021-09-05 PROCEDURE — 96365 THER/PROPH/DIAG IV INF INIT: CPT

## 2021-09-05 PROCEDURE — 72193 CT PELVIS W/DYE: CPT

## 2021-09-05 PROCEDURE — 87077 CULTURE AEROBIC IDENTIFY: CPT | Performed by: EMERGENCY MEDICINE

## 2021-09-05 RX ORDER — DIVALPROEX SODIUM 250 MG/1
250 TABLET, EXTENDED RELEASE ORAL
Status: DISCONTINUED | OUTPATIENT
Start: 2021-09-05 | End: 2021-09-06 | Stop reason: DRUGHIGH

## 2021-09-05 RX ORDER — ACETAMINOPHEN 325 MG/1
650 TABLET ORAL ONCE
Status: COMPLETED | OUTPATIENT
Start: 2021-09-05 | End: 2021-09-05

## 2021-09-05 RX ORDER — BENZONATATE 100 MG/1
200 CAPSULE ORAL 3 TIMES DAILY PRN
Status: DISCONTINUED | OUTPATIENT
Start: 2021-09-05 | End: 2021-09-15 | Stop reason: HOSPADM

## 2021-09-05 RX ORDER — CALCIUM CARBONATE 500(1250)
1 TABLET ORAL
Status: DISCONTINUED | OUTPATIENT
Start: 2021-09-06 | End: 2021-09-15 | Stop reason: HOSPADM

## 2021-09-05 RX ORDER — LISINOPRIL 10 MG/1
10 TABLET ORAL DAILY
Status: DISCONTINUED | OUTPATIENT
Start: 2021-09-06 | End: 2021-09-15 | Stop reason: HOSPADM

## 2021-09-05 RX ORDER — QUETIAPINE FUMARATE 50 MG/1
200 TABLET, EXTENDED RELEASE ORAL
Status: DISCONTINUED | OUTPATIENT
Start: 2021-09-06 | End: 2021-09-07

## 2021-09-05 RX ORDER — DIVALPROEX SODIUM 250 MG/1
250 TABLET, EXTENDED RELEASE ORAL 2 TIMES DAILY
Status: DISCONTINUED | OUTPATIENT
Start: 2021-09-05 | End: 2021-09-06 | Stop reason: DRUGHIGH

## 2021-09-05 RX ORDER — MELATONIN
1000 DAILY
Status: DISCONTINUED | OUTPATIENT
Start: 2021-09-06 | End: 2021-09-15 | Stop reason: HOSPADM

## 2021-09-05 RX ORDER — VANCOMYCIN HYDROCHLORIDE 1 G/200ML
15 INJECTION, SOLUTION INTRAVENOUS EVERY 12 HOURS
Status: DISCONTINUED | OUTPATIENT
Start: 2021-09-05 | End: 2021-09-06 | Stop reason: DRUGHIGH

## 2021-09-05 RX ORDER — GABAPENTIN 100 MG/1
100 CAPSULE ORAL 3 TIMES DAILY
Status: DISCONTINUED | OUTPATIENT
Start: 2021-09-05 | End: 2021-09-15 | Stop reason: HOSPADM

## 2021-09-05 RX ORDER — CLONAZEPAM 0.5 MG/1
0.25 TABLET ORAL 2 TIMES DAILY PRN
Status: DISCONTINUED | OUTPATIENT
Start: 2021-09-05 | End: 2021-09-15 | Stop reason: HOSPADM

## 2021-09-05 RX ORDER — LEVETIRACETAM 500 MG/1
1500 TABLET ORAL 2 TIMES DAILY
Status: DISCONTINUED | OUTPATIENT
Start: 2021-09-06 | End: 2021-09-15 | Stop reason: HOSPADM

## 2021-09-05 RX ORDER — TEMAZEPAM 15 MG/1
15 CAPSULE ORAL
Status: DISCONTINUED | OUTPATIENT
Start: 2021-09-05 | End: 2021-09-15 | Stop reason: HOSPADM

## 2021-09-05 RX ORDER — SODIUM CHLORIDE, SODIUM GLUCONATE, SODIUM ACETATE, POTASSIUM CHLORIDE, MAGNESIUM CHLORIDE, SODIUM PHOSPHATE, DIBASIC, AND POTASSIUM PHOSPHATE .53; .5; .37; .037; .03; .012; .00082 G/100ML; G/100ML; G/100ML; G/100ML; G/100ML; G/100ML; G/100ML
1000 INJECTION, SOLUTION INTRAVENOUS ONCE
Status: DISCONTINUED | OUTPATIENT
Start: 2021-09-05 | End: 2021-09-06

## 2021-09-05 RX ORDER — BENZTROPINE MESYLATE 0.5 MG/1
0.5 TABLET ORAL 2 TIMES DAILY
Status: DISCONTINUED | OUTPATIENT
Start: 2021-09-06 | End: 2021-09-15 | Stop reason: HOSPADM

## 2021-09-05 RX ORDER — POLYETHYLENE GLYCOL 3350 17 G/17G
17 POWDER, FOR SOLUTION ORAL DAILY
Status: DISCONTINUED | OUTPATIENT
Start: 2021-09-06 | End: 2021-09-15 | Stop reason: HOSPADM

## 2021-09-05 RX ORDER — LORATADINE 10 MG/1
10 TABLET ORAL DAILY
Status: DISCONTINUED | OUTPATIENT
Start: 2021-09-06 | End: 2021-09-15 | Stop reason: HOSPADM

## 2021-09-05 RX ORDER — SODIUM CHLORIDE, SODIUM GLUCONATE, SODIUM ACETATE, POTASSIUM CHLORIDE, MAGNESIUM CHLORIDE, SODIUM PHOSPHATE, DIBASIC, AND POTASSIUM PHOSPHATE .53; .5; .37; .037; .03; .012; .00082 G/100ML; G/100ML; G/100ML; G/100ML; G/100ML; G/100ML; G/100ML
150 INJECTION, SOLUTION INTRAVENOUS CONTINUOUS
Status: DISCONTINUED | OUTPATIENT
Start: 2021-09-05 | End: 2021-09-06

## 2021-09-05 RX ORDER — HYDROXYZINE HYDROCHLORIDE 25 MG/1
50 TABLET, FILM COATED ORAL 3 TIMES DAILY
Status: DISCONTINUED | OUTPATIENT
Start: 2021-09-05 | End: 2021-09-15 | Stop reason: HOSPADM

## 2021-09-05 RX ORDER — SODIUM CHLORIDE, SODIUM GLUCONATE, SODIUM ACETATE, POTASSIUM CHLORIDE, MAGNESIUM CHLORIDE, SODIUM PHOSPHATE, DIBASIC, AND POTASSIUM PHOSPHATE .53; .5; .37; .037; .03; .012; .00082 G/100ML; G/100ML; G/100ML; G/100ML; G/100ML; G/100ML; G/100ML
1000 INJECTION, SOLUTION INTRAVENOUS ONCE
Status: DISCONTINUED | OUTPATIENT
Start: 2021-09-06 | End: 2021-09-06

## 2021-09-05 RX ORDER — QUETIAPINE FUMARATE 50 MG/1
400 TABLET, EXTENDED RELEASE ORAL
Status: DISCONTINUED | OUTPATIENT
Start: 2021-09-05 | End: 2021-09-07

## 2021-09-05 RX ORDER — VANCOMYCIN HYDROCHLORIDE 1 G/200ML
15 INJECTION, SOLUTION INTRAVENOUS ONCE
Status: COMPLETED | OUTPATIENT
Start: 2021-09-05 | End: 2021-09-05

## 2021-09-05 RX ORDER — PRAVASTATIN SODIUM 40 MG
80 TABLET ORAL
Status: DISCONTINUED | OUTPATIENT
Start: 2021-09-06 | End: 2021-09-15 | Stop reason: HOSPADM

## 2021-09-05 RX ORDER — LACOSAMIDE 50 MG/1
200 TABLET ORAL EVERY 12 HOURS SCHEDULED
Status: DISCONTINUED | OUTPATIENT
Start: 2021-09-05 | End: 2021-09-08

## 2021-09-05 RX ADMIN — SODIUM CHLORIDE 1000 ML: 0.9 INJECTION, SOLUTION INTRAVENOUS at 22:15

## 2021-09-05 RX ADMIN — IOHEXOL 100 ML: 350 INJECTION, SOLUTION INTRAVENOUS at 21:30

## 2021-09-05 RX ADMIN — SODIUM CHLORIDE 1000 ML: 0.9 INJECTION, SOLUTION INTRAVENOUS at 19:17

## 2021-09-05 RX ADMIN — ACETAMINOPHEN 650 MG: 325 TABLET ORAL at 19:18

## 2021-09-05 RX ADMIN — VANCOMYCIN HYDROCHLORIDE 1000 MG: 1 INJECTION, SOLUTION INTRAVENOUS at 21:43

## 2021-09-05 RX ADMIN — SODIUM CHLORIDE 1000 ML: 0.9 INJECTION, SOLUTION INTRAVENOUS at 22:09

## 2021-09-05 NOTE — ED PROVIDER NOTES
History  Chief Complaint   Patient presents with    Lethargy    Multiple Falls     57-year-old male presents emergency room due to buttock tenderness from frequent falls  Patient notes that his buttocks or bruise  Patient also states that he just does not feel well today  He was brought to the hospital to be evaluated for feeling weak and increasingly somnolent  Patient had his COVID vaccine it in August of 2021  Denies any chest pain shortness of breath or headache  Prior to Admission Medications   Prescriptions Last Dose Informant Patient Reported? Taking?    Blood Glucose Monitoring Suppl (Renna Dancer) w/Device KIT Unknown at Unknown time Care Giver No No   Sig: by Does not apply route 3 (three) times a day TEST BLOOD SUGARS THREE TIMES   Depakote  MG 24 hr tablet Unknown at Unknown time  No No   Sig: TAKE 1 TABLET BY MOUTH IN THE MORNING *BRAND NECESSARY*;TAKE 2 TABLETS BY MOUTH AT BEDTIME   Humidifiers (Cool Mist Humidifier 1 gallon) MISC   No No   Sig: Use daily at bedtime   Insulin Pen Needle (PEN NEEDLES) 31G X 5 MM MISC Unknown at Unknown time Care Giver No No   Sig: by Does not apply route daily   Multiple Vitamin (Daily-Reina) TABS Unknown at Unknown time  No No   Sig: TAKE 1 TABLET BY MOUTH DAILY (8AM)   OneTouch Delica Lancets 31Q MISC Unknown at Unknown time Care Giver No No   Sig: by Does not apply route daily TEST BLOOD SUGARS THREE TIMES DAILY   QUEtiapine (SEROquel XR) 200 mg 24 hr tablet Unknown at Unknown time Care Giver No No   Sig: TAKE 1 TABLET BY MOUTH TWICE A DAY (8AM,2PM)   QUEtiapine (SEROquel XR) 400 mg 24 hr tablet Unknown at Unknown time Care Giver No No   Sig: TAKE 1 TABLET BY MOUTH AT BEDTIME (8PM) (DX: ANTIPSYCHOTIC)   Stool Softener 100 MG capsule Unknown at Unknown time  No No   Sig: TAKE 1 CAPSULE BY MOUTH TWICE A DAY (8AM,8PM) (DX: CONSTIPATION)   Vimpat 200 MG tablet Unknown at Unknown time  No No   Sig: TAKE 1 TABLET BY MOUTH EVERY 12 HOURS (DX: EPILEPSY)   benzonatate (TESSALON) 200 MG capsule Unknown at Unknown time  No No   Sig: Take 1 capsule (200 mg total) by mouth 3 (three) times a day as needed for cough   benztropine (COGENTIN) 0 5 mg tablet Unknown at Unknown time  No No   Sig: TAKE 1 TABLET BY MOUTH TWICE A DAY (8AM,8PM)   calcium carbonate (OYSTER SHELL,OSCAL) 500 mg Unknown at Unknown time  No No   Sig: TAKE 1 TABLET BY MOUTH 3 TIMES A DAY (8AM,2PM,8PM)   cholecalciferol (VITAMIN D3) 1,000 units tablet Unknown at Unknown time  No No   Sig: TAKE 1 TABLET BY MOUTH DAILY (8AM) (DX: VITAMIN DAILY DEFICIENCY)   clonazePAM (KlonoPIN) 0 25 MG disintegrating tablet Unknown at Unknown time  No No   Sig: Take 1 tablet (0 25 mg total) by mouth as needed (for clusters of myoclonus   can repeat once after 1 hour if myoclonus continues )   gabapentin (NEURONTIN) 100 mg capsule Unknown at Unknown time Care Giver Yes No   Sig: Take 100 mg by mouth 3 (three) times a day    glucose blood (OneTouch Verio) test strip Unknown at Unknown time Care Giver No No   Sig: TEST BLOOD SUGARS THREE TIMES DAILY   hydrOXYzine pamoate (VISTARIL) 50 mg capsule Unknown at Unknown time Care Giver No No   Sig: TAKE 1 CAPSULE BY MOUTH 3 TIMES A DAY   ibuprofen (MOTRIN) 600 mg tablet Unknown at Unknown time Care Giver No No   Sig: Take 1 tablet (600 mg total) by mouth every 8 (eight) hours   ketoconazole (NIZORAL) 2 % shampoo Unknown at Unknown time Care Giver No No   Sig: Wash hair and sideburns at least 3 times per week   levETIRAcetam (KEPPRA) 750 mg tablet Unknown at Unknown time  No No   Sig: Take 2 tablets (1,500 mg total) by mouth 2 (two) times a day   levothyroxine 125 mcg tablet Unknown at Unknown time  No No   Sig: TAKE 1 TABLET BY MOUTH DAILY (8AM) (DX: OTHER SPECIFIED HYPOTHYROIDISM)   lisinopril (ZESTRIL) 10 mg tablet Unknown at Unknown time  No No   Sig: TAKE 1 TABLET BY MOUTH DAILY (8AM) (DX: HYPERTENSION)   loratadine (CLARITIN) 10 mg tablet Unknown at Unknown time Care Giver No No   Sig: One tab daily as needed for allergy symptoms   metFORMIN (GLUCOPHAGE) 1000 MG tablet Unknown at Unknown time  No No   Sig: TAKE 1 TABLET BY MOUTH TWICE A DAY WITH MEALS (8AM, 8PM) (DX: DIABETES MELLITUS)   polyethylene glycol (MIRALAX) 17 g packet Unknown at Unknown time Care Giver No No   Sig: Take 17 g by mouth daily   simvastatin (ZOCOR) 40 mg tablet Unknown at Unknown time  No No   Sig: TAKE 1 TABLET BY MOUTH DAILY (DX: HYPERLIPIDEMIA)   sodium chloride (OCEAN) 0 65 % nasal spray   No No   Si spray into each nostril as needed for congestion   temazepam (RESTORIL) 15 mg capsule Unknown at Unknown time  Yes No   Sig: Take 15 mg by mouth daily at bedtime as needed for sleep    temazepam (RESTORIL) 7 5 mg capsule Unknown at Unknown time Care Giver Yes No   Sig: Take 15 mg by mouth daily at bedtime as needed for sleep   triamcinolone (KENALOG) 0 1 % cream Unknown at Unknown time Care Giver No No   Sig: Apply topically 2 (two) times a day   vitamin B-12 (VITAMIN B-12) 1,000 mcg tablet Unknown at Unknown time  No No   Sig: TAKE 1 TABLET BY MOUTH EVERY OTHER DAY (8AM) (DX: DEFICIENCY OF OTHER SPECIFIED B GROUP VITAMINS)      Facility-Administered Medications: None       Past Medical History:   Diagnosis Date    ADRIANA (acute kidney injury) ([facility] ) 2019    CP (cerebral palsy) (HCC)     Depression     Diabetes (HCC)     Disease of thyroid gland     Gait disorder     Hyperlipidemia     Hypertension     Kidney failure     Kidney stones     Osteoporosis     Psychiatric disorder     Scoliosis     Seizures ([facility] )     Thyroid disease     UTI (urinary tract infection)        Past Surgical History:   Procedure Laterality Date    APPENDECTOMY         History reviewed  No pertinent family history  I have reviewed and agree with the history as documented      E-Cigarette/Vaping    E-Cigarette Use Never User      E-Cigarette/Vaping Substances     Social History     Tobacco Use    Smoking status: Never Smoker    Smokeless tobacco: Never Used   Vaping Use    Vaping Use: Never used   Substance Use Topics    Alcohol use: Never    Drug use: No       Review of Systems   Constitutional: Positive for fatigue and fever  Negative for chills  HENT: Negative for ear pain and sore throat  Eyes: Negative for pain and visual disturbance  Respiratory: Negative for cough and shortness of breath  Cardiovascular: Negative for chest pain and palpitations  Gastrointestinal: Negative for abdominal pain and vomiting  Genitourinary: Negative for dysuria and hematuria  Musculoskeletal: Negative for arthralgias, back pain and neck pain  Skin: Negative for color change and rash  Neurological: Positive for weakness  Negative for seizures and syncope  Psychiatric/Behavioral: Positive for decreased concentration and dysphoric mood  All other systems reviewed and are negative  Physical Exam  Physical Exam  Constitutional:       General: He is not in acute distress  Appearance: Normal appearance  He is normal weight  He is ill-appearing  He is not toxic-appearing  HENT:      Head: Normocephalic and atraumatic  Right Ear: External ear normal       Left Ear: External ear normal       Nose: Nose normal       Mouth/Throat:      Mouth: Mucous membranes are moist    Eyes:      Conjunctiva/sclera: Conjunctivae normal    Cardiovascular:      Rate and Rhythm: Normal rate and regular rhythm  Pulses: Normal pulses  Heart sounds: Normal heart sounds  Pulmonary:      Effort: Pulmonary effort is normal       Breath sounds: Normal breath sounds  Abdominal:      General: Abdomen is flat  There is no distension  Palpations: Abdomen is soft  There is no mass  Musculoskeletal:         General: No swelling, tenderness or deformity  Normal range of motion  Cervical back: Normal range of motion  Skin:     General: Skin is warm and dry        Capillary Refill: Capillary refill takes 2 to 3 seconds  Coloration: Skin is pale  Comments: Left buttock shows a red hard indurated area roughly 8 to 9 in in diameter with some weeping sero purulence fluid from the center  Neurological:      General: No focal deficit present  Mental Status: He is alert and oriented to person, place, and time  Mental status is at baseline     Psychiatric:         Mood and Affect: Mood normal          Vital Signs  ED Triage Vitals   Temperature Pulse Respirations Blood Pressure SpO2   09/05/21 1746 09/05/21 1746 09/05/21 1746 09/05/21 1746 09/05/21 1746   (!) 100 9 °F (38 3 °C) (!) 118 20 144/61 99 %      Temp Source Heart Rate Source Patient Position - Orthostatic VS BP Location FiO2 (%)   09/05/21 1910 09/05/21 1746 09/06/21 0000 09/06/21 0000 --   Tympanic Monitor Lying Right arm       Pain Score       09/05/21 2357       No Pain           Vitals:    09/05/21 2245 09/05/21 2315 09/05/21 2330 09/06/21 0000   BP:    126/62   Pulse: 100 101 100 90   Patient Position - Orthostatic VS:    Lying         Visual Acuity      ED Medications  Medications   benzonatate (TESSALON PERLES) capsule 200 mg (has no administration in time range)   benztropine (COGENTIN) tablet 0 5 mg (has no administration in time range)   calcium carbonate (OYSTER SHELL,OSCAL) 500 mg tablet 1 tablet (has no administration in time range)   cholecalciferol (VITAMIN D3) tablet 1,000 Units (has no administration in time range)   clonazePAM (KlonoPIN) tablet 0 25 mg (has no administration in time range)   gabapentin (NEURONTIN) capsule 100 mg (100 mg Oral Given 9/6/21 0108)   hydrOXYzine HCL (ATARAX) tablet 50 mg (50 mg Oral Given 9/6/21 0108)   levETIRAcetam (KEPPRA) tablet 1,500 mg (has no administration in time range)   levothyroxine tablet 125 mcg (has no administration in time range)   lisinopril (ZESTRIL) tablet 10 mg (has no administration in time range)   loratadine (CLARITIN) tablet 10 mg (has no administration in time range) polyethylene glycol (MIRALAX) packet 17 g (has no administration in time range)   QUEtiapine (SEROquel XR) 24 hr tablet 200 mg (has no administration in time range)   QUEtiapine (SEROquel XR) 24 hr tablet 400 mg (has no administration in time range)   pravastatin (PRAVACHOL) tablet 80 mg (has no administration in time range)   temazepam (RESTORIL) capsule 15 mg ( Oral Canceled Entry 9/6/21 0057)   lacosamide (VIMPAT) tablet 200 mg (200 mg Oral Given 9/6/21 0109)   cyanocobalamin (VITAMIN B-12) tablet 1,000 mcg (has no administration in time range)   insulin lispro (HumaLOG) 100 units/mL subcutaneous injection 1-5 Units (0 Units Subcutaneous Hold 9/6/21 0027)   multi-electrolyte (PLASMALYTE-A/ISOLYTE-S PH 7 4) IV solution (150 mL/hr Intravenous New Bag 9/6/21 0106)   vancomycin (VANCOCIN) IVPB (premix in dextrose) 1,000 mg 200 mL (has no administration in time range)   divalproex sodium (DEPAKOTE ER) 24 hr tablet 1,000 mg (1,000 mg Oral Given 9/6/21 0108)   divalproex sodium (DEPAKOTE ER) 24 hr tablet 500 mg (has no administration in time range)   sodium chloride 0 9 % bolus 1,000 mL (0 mL Intravenous Stopped 9/5/21 2130)   acetaminophen (TYLENOL) tablet 650 mg (650 mg Oral Given 9/5/21 1918)   vancomycin (VANCOCIN) IVPB (premix in dextrose) 1,000 mg 200 mL (0 mg/kg × 65 8 kg Intravenous Stopped 9/5/21 2245)   iohexol (OMNIPAQUE) 350 MG/ML injection (SINGLE-DOSE) 100 mL (100 mL Intravenous Given 9/5/21 2130)   sodium chloride 0 9 % bolus 1,000 mL (0 mL Intravenous Stopped 9/5/21 2338)       Diagnostic Studies  Results Reviewed     Procedure Component Value Units Date/Time    Procalcitonin with AM Reflex [[id number]]     Lab Status: No result Specimen: Blood     Urine Microscopic [[id number]]  (Abnormal) Collected: 09/05/21 2214    Lab Status: Final result Specimen: Urine, Clean Catch Updated: 09/05/21 2228     RBC, UA 2-4 /hpf      WBC, UA 0-1 /hpf      Epithelial Cells Occasional /hpf      Bacteria, UA Moderate /hpf UA w Reflex to Microscopic w Reflex to Culture [[id number]]  (Abnormal) Collected: 09/05/21 2214    Lab Status: Final result Specimen: Urine, Clean Catch Updated: 09/05/21 2221     Color, UA Yellow     Clarity, UA Clear     Specific Gravity, UA <=1 005     pH, UA 7 0     Leukocytes, UA Negative     Nitrite, UA Positive     Protein, UA Negative mg/dl      Glucose, UA Negative mg/dl      Ketones, UA Trace mg/dl      Urobilinogen, UA 0 2 E U /dl      Bilirubin, UA Negative     Blood, UA 2+    Lactic acid 2 Hours [[id number]]  (Normal) Collected: 09/05/21 2120    Lab Status: Final result Specimen: Blood from Arm, Left Updated: 09/05/21 2144     LACTIC ACID 1 6 mmol/L     Narrative:      Result may be elevated if tourniquet was used during collection  Wound culture and Gram stain [[id number]] Collected: 09/05/21 2015    Lab Status: In process Specimen: Wound from Buttock Updated: 09/05/21 2018    Novel Coronavirus (Covid-19),PCR [facility] - 2 Hour Stat [[id number]]  (Normal) Collected: 09/05/21 1840    Lab Status: Final result Specimen: Nares from Nose Updated: 09/05/21 2009     SARS-CoV-2 Negative    Narrative: The specimen collection materials, transport medium, and/or testing methodology utilized in the production of these test results have been proven to be reliable in a limited validation with an abbreviated program under the Emergency Utilization Authorization provided by the FDA  Testing reported as "Presumptive positive" will be confirmed with secondary testing to ensure result accuracy  Clinical caution and judgement should be used with the interpretation of these results with consideration of the clinical impression and other laboratory testing  Testing reported as "Positive" or "Negative" has been proven to be accurate according to standard laboratory validation requirements  All testing is performed with control materials showing appropriate reactivity at standard intervals        Lactic acid, plasma [[id number]]  (Abnormal) Collected: 09/05/21 1906    Lab Status: Final result Specimen: Blood from Arm, Left Updated: 09/05/21 1944     LACTIC ACID 3 2 mmol/L     Narrative:      Result may be elevated if tourniquet was used during collection  Result may be elevated if tourniquet was used during collection      Comprehensive metabolic panel [[id number]]  (Abnormal) Collected: 09/05/21 1906    Lab Status: Final result Specimen: Blood from Arm, Left Updated: 09/05/21 1943     Sodium 132 mmol/L      Potassium 3 9 mmol/L      Chloride 92 mmol/L      CO2 27 mmol/L      ANION GAP 13 mmol/L      BUN 13 mg/dL      Creatinine 0 67 mg/dL      Glucose 141 mg/dL      Calcium 8 5 mg/dL      Corrected Calcium 9 3 mg/dL      AST 36 U/L      ALT 30 U/L      Alkaline Phosphatase 50 U/L      Total Protein 5 9 g/dL      Albumin 3 0 g/dL      Total Bilirubin 0 30 mg/dL      eGFR 109 ml/min/1 73sq m     Narrative:      [place] guidelines for Chronic Kidney Disease (CKD):     Stage 1 with normal or high GFR (GFR > 90 mL/min/1 73 square meters)    Stage 2 Mild CKD (GFR = 60-89 mL/min/1 73 square meters)    Stage 3A Moderate CKD (GFR = 45-59 mL/min/1 73 square meters)    Stage 3B Moderate CKD (GFR = 30-44 mL/min/1 73 square meters)    Stage 4 Severe CKD (GFR = 15-29 mL/min/1 73 square meters)    Stage 5 End Stage CKD (GFR <15 mL/min/1 73 square meters)  Note: GFR calculation is accurate only with a steady state creatinine    Lipase [[id number]]  (Normal) Collected: 09/05/21 1906    Lab Status: Final result Specimen: Blood from Arm, Left Updated: 09/05/21 1942     Lipase 63 u/L     Valproic acid level, total [[id number]]  (Normal) Collected: 09/05/21 1906    Lab Status: Final result Specimen: Blood from Arm, Left Updated: 09/05/21 1942     Valproic Acid, Total 68 4 ug/mL     Manual Differential (Non Wam) [[id number]]  (Abnormal) Collected: 09/05/21 1906    Lab Status: Final result Specimen: Blood from Arm, Left Updated: 09/05/21 1942     Segmented % 75 %      Bands % 4 %      Lymphocytes % 10 %      Monocytes % 11 %      Neutrophils Absolute 13 27 Thousand/uL      Lymphocytes Absolute 1 68 Thousand/uL      Monocytes Absolute 1 85 Thousand/uL      Total Counted 100     RBC Morphology Normal     Platelet Estimate Adequate    Troponin I [[id number]]  (Normal) Collected: 09/05/21 1906    Lab Status: Final result Specimen: Blood from Arm, Left Updated: 09/05/21 1941     Troponin I <0 03 ng/mL     CBC and differential [[id number]]  (Abnormal) Collected: 09/05/21 1906    Lab Status: Final result Specimen: Blood from Arm, Left Updated: 09/05/21 1922     WBC 16 80 Thousand/uL      RBC 3 43 Million/uL      Hemoglobin 10 8 g/dL      Hematocrit 32 7 %      MCV 95 fL      MCH 31 5 pg      MCHC 33 1 g/dL      RDW 13 4 %      MPV 8 1 fL      Platelets 694 Thousands/uL     Blood culture #1 [[id number]] Collected: 09/05/21 1906    Lab Status: In process Specimen: Blood from Arm, Left Updated: 09/05/21 1912    Blood culture #2 [[id number]] Collected: 09/05/21 1906    Lab Status: In process Specimen: Blood from Arm, Left Updated: 09/05/21 1912                 CT pelvis w contrast   Final Result by Chucho Augustin MD (09/05 2241)      Phlegmonous change/early abscess overlying the left gluteus bree muscle measuring 3 4 x 8 4 cm in cross-section and 9 7 cm in craniocaudal dimension         Workstation performed: [id number]         XR pelvis ap only 1 or 2 views   ED Interpretation by Fabio Ortega DO (09/05 1959)   No acute pathology      XR chest 1 view portable    (Results Pending)              Procedures  ECG 12 Lead Documentation Only    Date/Time: 9/5/2021 7:46 PM  Performed by: Fabio Ortega DO  Authorized by: Fabio Ortega DO     ECG reviewed by me, the ED Provider: yes    Patient location:  ED  Comments:      EKG shows a sinus tachycardia 118 per  There is a normal axis, and right bundle zia block pattern  No definitive acute ST or T-wave changes are appreciated  ED Course  ED Course as of Sep 06 0128   Garett Butterfield Sep 05, 2021   1933 WBC(!): 16 80   2258 Case discussed with Dr Shamar ortega who will consult on the patient in the morning  MDM    Disposition  Final diagnoses:   Sepsis ([email or web address] 75 )   Gluteal abscess     Time reflects when diagnosis was documented in both MDM as applicable and the Disposition within this note     Time User Action Codes Description Comment    9/5/2021 11:09 PM Graeme Camper Add [A41 9] Sepsis ([email or web address] 75 )     9/5/2021 11:09 PM Brutico, Dennie Cranker Add [L02 31] Gluteal abscess       ED Disposition     ED Disposition Condition Date/Time Comment    Admit Stable Sun Sep 5, 2021 11:09 PM Case was discussed with Nancy Nixon and the patient's admission status was agreed to be Admission Status: inpatient status to the service of Dr Clementina Chatman   Follow-up Information    None         Current Discharge Medication List      CONTINUE these medications which have NOT CHANGED    Details   benzonatate (TESSALON) 200 MG capsule Take 1 capsule (200 mg total) by mouth 3 (three) times a day as needed for cough  Qty: 20 capsule, Refills: 0    Associated Diagnoses: Viral URI with cough      benztropine (COGENTIN) 0 5 mg tablet TAKE 1 TABLET BY MOUTH TWICE A DAY (8AM,8PM)  Qty: 56 tablet, Refills: 4    Comments: PATIENT NEEDS A NEW PRESCRIPTION FOR THEIR CHRONIC MEDICATION, FOR THEIR UPCOMING MED DELIVERY  THANK YOU FOR YOUR TIME  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[place] [address] )      Blood Glucose Monitoring Suppl (Leita Comp) w/Device KIT by Does not apply route 3 (three) times a day TEST BLOOD SUGARS THREE TIMES  Qty: 1 kit, Refills: 0    Associated Diagnoses: Diabetes mellitus without complication ([place] [address] );  Diabetes mellitus due to underlying condition with hyperosmolarity without coma, without long-term current use of insulin (HCC)      calcium carbonate (OYSTER SHELL,OSCAL) 500 mg TAKE 1 TABLET BY MOUTH 3 TIMES A DAY (8AM,2PM,8PM)  Qty: 84 tablet, Refills: 4    Comments: PATIENT NEEDS A NEW PRESCRIPTION FOR THEIR CHRONIC MEDICATION, FOR THEIR UPCOMING MED DELIVERY  THANK YOU FOR YOUR TIME  Associated Diagnoses: Diabetes mellitus due to underlying condition with hyperosmolarity without coma, without long-term current use of insulin ([facility] 75 ); Hyperlipidemia, unspecified hyperlipidemia type      cholecalciferol (VITAMIN D3) 1,000 units tablet TAKE 1 TABLET BY MOUTH DAILY (8AM) (DX: VITAMIN DAILY DEFICIENCY)  Qty: 28 tablet, Refills: 4    Comments: PATIENT NEEDS A NEW PRESCRIPTION FOR THEIR CHRONIC MEDICATION, FOR THEIR UPCOMING MED DELIVERY  THANK YOU FOR YOUR TIME  Associated Diagnoses: Vitamin D deficiency      clonazePAM (KlonoPIN) 0 25 MG disintegrating tablet Take 1 tablet (0 25 mg total) by mouth as needed (for clusters of myoclonus  can repeat once after 1 hour if myoclonus continues )  Qty: 10 tablet, Refills: 1    Associated Diagnoses: Generalized convulsive epilepsy (HCC)      Depakote  MG 24 hr tablet TAKE 1 TABLET BY MOUTH IN THE MORNING *BRAND NECESSARY*;TAKE 2 TABLETS BY MOUTH AT BEDTIME  Qty: 90 tablet, Refills: 11    Comments: PATIENT NEEDS A NEW PRESCRIPTION FOR THEIR CHRONIC MEDICATION, FOR THEIR UPCOMING MED DELIVERY  THANK YOU FOR YOUR TIME  Associated Diagnoses: Generalized convulsive epilepsy ([facility] 75 )      gabapentin (NEURONTIN) 100 mg capsule Take 100 mg by mouth 3 (three) times a day       glucose blood (OneTouch Verio) test strip TEST BLOOD SUGARS THREE TIMES DAILY  Qty: 100 each, Refills: 5    Associated Diagnoses: Diabetes mellitus without complication ([facility] );  Diabetes mellitus due to underlying condition with hyperosmolarity without coma, without long-term current use of insulin ([facility])      Humidifiers (Cool Mist Humidifier 1 gallon) MISC Use daily at bedtime  Qty: 1 each, Refills: 0    Associated Diagnoses: Viral URI with cough      hydrOXYzine pamoate (VISTARIL) 50 mg capsule TAKE 1 CAPSULE BY MOUTH 3 TIMES A DAY  Qty: 84 capsule, Refills: 10    Associated Diagnoses: Anxiety      ibuprofen (MOTRIN) 600 mg tablet Take 1 tablet (600 mg total) by mouth every 8 (eight) hours  Qty: 60 tablet, Refills: 0    Associated Diagnoses: Other chronic pain      Insulin Pen Needle (PEN NEEDLES) 31G X 5 MM MISC by Does not apply route daily  Qty: 100 each, Refills: 5    Associated Diagnoses: Diabetes mellitus without complication (HCC)      ketoconazole (NIZORAL) 2 % shampoo Wash hair and sideburns at least 3 times per week  Qty: 120 mL, Refills: 3    Associated Diagnoses: Seborrheic dermatitis of scalp      levETIRAcetam (KEPPRA) 750 mg tablet Take 2 tablets (1,500 mg total) by mouth 2 (two) times a day  Qty: 120 tablet, Refills: 11    Associated Diagnoses: Involuntary movements      levothyroxine 125 mcg tablet TAKE 1 TABLET BY MOUTH DAILY (8AM) (DX: OTHER SPECIFIED HYPOTHYROIDISM)  Qty: 28 tablet, Refills: 4    Comments: PATIENT NEEDS A NEW PRESCRIPTION FOR THEIR CHRONIC MEDICATION, FOR THEIR UPCOMING MED DELIVERY  THANK YOU FOR YOUR TIME  Associated Diagnoses: Hypothyroidism due to Hashimoto's thyroiditis      lisinopril (ZESTRIL) 10 mg tablet TAKE 1 TABLET BY MOUTH DAILY (8AM) (DX: HYPERTENSION)  Qty: 28 tablet, Refills: 4    Comments: PATIENT NEEDS A NEW PRESCRIPTION FOR THEIR CHRONIC MEDICATION, FOR THEIR UPCOMING MED DELIVERY  THANK YOU FOR YOUR TIME  Associated Diagnoses: Hypertension, unspecified type      loratadine (CLARITIN) 10 mg tablet One tab daily as needed for allergy symptoms  Qty: 90 tablet, Refills: 0    Associated Diagnoses: Environmental allergies      metFORMIN (GLUCOPHAGE) 1000 MG tablet TAKE 1 TABLET BY MOUTH TWICE A DAY WITH MEALS (8AM, 8PM) (DX: DIABETES MELLITUS)  Qty: 56 tablet, Refills: 4    Comments: PATIENT NEEDS A NEW PRESCRIPTION FOR THEIR CHRONIC MEDICATION, FOR THEIR UPCOMING MED DELIVERY  THANK YOU FOR YOUR TIME    Associated Diagnoses: Diabetes mellitus due to underlying condition with hyperosmolarity without coma, without long-term current use of insulin ([facility])      Multiple Vitamin (Daily-Reina) TABS TAKE 1 TABLET BY MOUTH DAILY (8AM)  Qty: 28 tablet, Refills: 4    Comments: PATIENT NEEDS A NEW PRESCRIPTION FOR THEIR CHRONIC MEDICATION, FOR THEIR UPCOMING MED DELIVERY  THANK YOU FOR YOUR TIME  Associated Diagnoses: Diabetes mellitus due to underlying condition with hyperosmolarity without coma, without long-term current use of insulin ([facility] 75 ); Seizure ([facility] 75 ); Neuropathy; Hyperlipidemia, unspecified hyperlipidemia type; Hypertension, unspecified type      OneTouch Delica Lancets 47G MISC by Does not apply route daily TEST BLOOD SUGARS THREE TIMES DAILY  Qty: 100 each, Refills: 2    Associated Diagnoses: Diabetes mellitus due to underlying condition with hyperosmolarity without coma, without long-term current use of insulin ([facility])      polyethylene glycol (MIRALAX) 17 g packet Take 17 g by mouth daily  Qty: 14 each, Refills: 8    Associated Diagnoses: Constipation, unspecified constipation type      !! QUEtiapine (SEROquel XR) 200 mg 24 hr tablet TAKE 1 TABLET BY MOUTH TWICE A DAY (8AM,2PM)  Qty: 16 tablet, Refills: 10    Associated Diagnoses: Generalized convulsive epilepsy ([facility] ); Behavior concern in adult      !! QUEtiapine (SEROquel XR) 400 mg 24 hr tablet TAKE 1 TABLET BY MOUTH AT BEDTIME (8PM) (DX: ANTIPSYCHOTIC)  Qty: 28 tablet, Refills: 10    Associated Diagnoses: Generalized convulsive epilepsy ([facility] ); Behavior concern in adult      simvastatin (ZOCOR) 40 mg tablet TAKE 1 TABLET BY MOUTH DAILY (DX: HYPERLIPIDEMIA)  Qty: 28 tablet, Refills: 4    Comments: PATIENT NEEDS A NEW PRESCRIPTION FOR THEIR CHRONIC MEDICATION, FOR THEIR UPCOMING MED DELIVERY  THANK YOU FOR YOUR TIME    Associated Diagnoses: Hyperlipidemia, unspecified hyperlipidemia type      sodium chloride (OCEAN) 0 65 % nasal spray 1 spray into each nostril as needed for congestion  Qty: 15 mL, Refills: 1    Associated Diagnoses: Viral URI with cough      Stool Softener 100 MG capsule TAKE 1 CAPSULE BY MOUTH TWICE A DAY (8AM,8PM) (DX: CONSTIPATION)  Qty: 56 capsule, Refills: 4    Comments: PATIENT NEEDS A NEW PRESCRIPTION FOR THEIR CHRONIC MEDICATION, FOR THEIR UPCOMING MED DELIVERY  THANK YOU FOR YOUR TIME  Associated Diagnoses: Constipation, unspecified constipation type      !! temazepam (RESTORIL) 15 mg capsule Take 15 mg by mouth daily at bedtime as needed for sleep       !! temazepam (RESTORIL) 7 5 mg capsule Take 15 mg by mouth daily at bedtime as needed for sleep      triamcinolone (KENALOG) 0 1 % cream Apply topically 2 (two) times a day  Qty: 30 g, Refills: 0    Associated Diagnoses: Insect bite, unspecified site, initial encounter      Vimpat 200 MG tablet TAKE 1 TABLET BY MOUTH EVERY 12 HOURS (DX: EPILEPSY)  Qty: 60 tablet, Refills: 2    Comments: PATIENT NEEDS A NEW PRESCRIPTION FOR THEIR CHRONIC MEDICATION, FOR THEIR UPCOMING MED DELIVERY  THANK YOU FOR YOUR TIME  Associated Diagnoses: Generalized convulsive epilepsy ([facility] )      vitamin B-12 (VITAMIN B-12) 1,000 mcg tablet TAKE 1 TABLET BY MOUTH EVERY OTHER DAY (8AM) (DX: DEFICIENCY OF OTHER SPECIFIED B GROUP VITAMINS)  Qty: 14 tablet, Refills: 4    Associated Diagnoses: B12 deficiency       ! ! - Potential duplicate medications found  Please discuss with provider  No discharge procedures on file      PDMP Review       Value Time User    PDMP Reviewed  Yes 7/15/2021 10:42 AM HUNTER Quiñonez          ED Provider  Electronically Signed by           Ricardo Harrison DO  09/06/21 0128

## 2021-09-06 PROBLEM — L02.31 GLUTEAL ABSCESS: Status: ACTIVE | Noted: 2021-09-06

## 2021-09-06 LAB
ANION GAP SERPL CALCULATED.3IONS-SCNC: 7 MMOL/L (ref 4–13)
ATRIAL RATE: 118 BPM
BUN SERPL-MCNC: 9 MG/DL (ref 7–25)
CALCIUM SERPL-MCNC: 8.1 MG/DL (ref 8.6–10.5)
CHLORIDE SERPL-SCNC: 97 MMOL/L (ref 98–107)
CO2 SERPL-SCNC: 28 MMOL/L (ref 21–31)
CREAT SERPL-MCNC: 0.5 MG/DL (ref 0.7–1.3)
ERYTHROCYTE [DISTWIDTH] IN BLOOD BY AUTOMATED COUNT: 13.3 % (ref 11.5–14.5)
GFR SERPL CREATININE-BSD FRML MDRD: 123 ML/MIN/1.73SQ M
GLUCOSE SERPL-MCNC: 114 MG/DL (ref 65–140)
GLUCOSE SERPL-MCNC: 119 MG/DL (ref 65–140)
GLUCOSE SERPL-MCNC: 124 MG/DL (ref 65–140)
GLUCOSE SERPL-MCNC: 130 MG/DL (ref 65–140)
GLUCOSE SERPL-MCNC: 97 MG/DL (ref 65–99)
GLUCOSE SERPL-MCNC: 99 MG/DL (ref 65–140)
HCT VFR BLD AUTO: 29.2 % (ref 42–47)
HGB BLD-MCNC: 9.9 G/DL (ref 14–18)
LYMPHOCYTES # BLD AUTO: 16 % (ref 20–51)
LYMPHOCYTES # BLD AUTO: 2.7 THOUSAND/UL (ref 0.6–4.47)
MCH RBC QN AUTO: 32.2 PG (ref 26–34)
MCHC RBC AUTO-ENTMCNC: 33.8 G/DL (ref 31–37)
MCV RBC AUTO: 95 FL (ref 81–99)
MONOCYTES # BLD AUTO: 2.7 THOUSAND/UL (ref 0–1.22)
MONOCYTES NFR BLD AUTO: 16 % (ref 4–12)
NEUTS BAND NFR BLD MANUAL: 8 % (ref 0–8)
NEUTS SEG # BLD: 11.49 THOUSAND/UL (ref 1.81–6.82)
NEUTS SEG NFR BLD AUTO: 60 % (ref 42–75)
PLATELET # BLD AUTO: 212 THOUSANDS/UL (ref 149–390)
PLATELET BLD QL SMEAR: ADEQUATE
PMV BLD AUTO: 8.6 FL (ref 8.6–11.7)
POTASSIUM SERPL-SCNC: 3.7 MMOL/L (ref 3.5–5.5)
PR INTERVAL: 120 MS
PROCALCITONIN SERPL-MCNC: 0.38 NG/ML
QRS AXIS: 77 DEGREES
QRSD INTERVAL: 134 MS
QT INTERVAL: 348 MS
QTC INTERVAL: 487 MS
RBC # BLD AUTO: 3.08 MILLION/UL (ref 4.3–5.9)
RBC MORPH BLD: NORMAL
SODIUM SERPL-SCNC: 132 MMOL/L (ref 134–143)
T WAVE AXIS: -8 DEGREES
TOTAL CELLS COUNTED SPEC: 100
VANCOMYCIN TROUGH SERPL-MCNC: 11.2 UG/ML (ref 5–12)
VENTRICULAR RATE: 118 BPM
WBC # BLD AUTO: 16.9 THOUSAND/UL (ref 4.8–10.8)

## 2021-09-06 PROCEDURE — 80048 BASIC METABOLIC PNL TOTAL CA: CPT | Performed by: PHYSICIAN ASSISTANT

## 2021-09-06 PROCEDURE — 99221 1ST HOSP IP/OBS SF/LOW 40: CPT | Performed by: SURGERY

## 2021-09-06 PROCEDURE — 85007 BL SMEAR W/DIFF WBC COUNT: CPT | Performed by: PHYSICIAN ASSISTANT

## 2021-09-06 PROCEDURE — 99223 1ST HOSP IP/OBS HIGH 75: CPT | Performed by: INTERNAL MEDICINE

## 2021-09-06 PROCEDURE — 87205 SMEAR GRAM STAIN: CPT | Performed by: PHYSICIAN ASSISTANT

## 2021-09-06 PROCEDURE — 85027 COMPLETE CBC AUTOMATED: CPT | Performed by: PHYSICIAN ASSISTANT

## 2021-09-06 PROCEDURE — 87186 SC STD MICRODIL/AGAR DIL: CPT | Performed by: PHYSICIAN ASSISTANT

## 2021-09-06 PROCEDURE — 80202 ASSAY OF VANCOMYCIN: CPT | Performed by: PHYSICIAN ASSISTANT

## 2021-09-06 PROCEDURE — NC001 PR NO CHARGE: Performed by: SURGERY

## 2021-09-06 PROCEDURE — 87070 CULTURE OTHR SPECIMN AEROBIC: CPT | Performed by: PHYSICIAN ASSISTANT

## 2021-09-06 PROCEDURE — 10061 I&D ABSCESS COMP/MULTIPLE: CPT | Performed by: SURGERY

## 2021-09-06 PROCEDURE — 82948 REAGENT STRIP/BLOOD GLUCOSE: CPT

## 2021-09-06 PROCEDURE — 93010 ELECTROCARDIOGRAM REPORT: CPT | Performed by: INTERNAL MEDICINE

## 2021-09-06 PROCEDURE — 0J990ZZ DRAINAGE OF BUTTOCK SUBCUTANEOUS TISSUE AND FASCIA, OPEN APPROACH: ICD-10-PCS | Performed by: SURGERY

## 2021-09-06 PROCEDURE — 84145 PROCALCITONIN (PCT): CPT | Performed by: PHYSICIAN ASSISTANT

## 2021-09-06 RX ORDER — LIDOCAINE HYDROCHLORIDE AND EPINEPHRINE 10; 10 MG/ML; UG/ML
30 INJECTION, SOLUTION INFILTRATION; PERINEURAL ONCE
Status: COMPLETED | OUTPATIENT
Start: 2021-09-06 | End: 2021-09-06

## 2021-09-06 RX ORDER — DIVALPROEX SODIUM 500 MG/1
500 TABLET, EXTENDED RELEASE ORAL DAILY
Status: DISCONTINUED | OUTPATIENT
Start: 2021-09-06 | End: 2021-09-15 | Stop reason: HOSPADM

## 2021-09-06 RX ORDER — DIVALPROEX SODIUM 500 MG/1
1000 TABLET, EXTENDED RELEASE ORAL
Status: DISCONTINUED | OUTPATIENT
Start: 2021-09-06 | End: 2021-09-15 | Stop reason: HOSPADM

## 2021-09-06 RX ORDER — VANCOMYCIN HYDROCHLORIDE 1 G/200ML
15 INJECTION, SOLUTION INTRAVENOUS EVERY 8 HOURS
Status: DISCONTINUED | OUTPATIENT
Start: 2021-09-06 | End: 2021-09-06

## 2021-09-06 RX ADMIN — GABAPENTIN 100 MG: 100 CAPSULE ORAL at 09:46

## 2021-09-06 RX ADMIN — LACOSAMIDE 200 MG: 50 TABLET, FILM COATED ORAL at 01:09

## 2021-09-06 RX ADMIN — GABAPENTIN 100 MG: 100 CAPSULE ORAL at 16:40

## 2021-09-06 RX ADMIN — QUETIAPINE FUMARATE 400 MG: 50 TABLET, EXTENDED RELEASE ORAL at 22:26

## 2021-09-06 RX ADMIN — LACOSAMIDE 200 MG: 50 TABLET, FILM COATED ORAL at 09:51

## 2021-09-06 RX ADMIN — LACOSAMIDE 200 MG: 50 TABLET, FILM COATED ORAL at 22:26

## 2021-09-06 RX ADMIN — BENZTROPINE MESYLATE 0.5 MG: 0.5 TABLET ORAL at 09:46

## 2021-09-06 RX ADMIN — LEVETIRACETAM 1500 MG: 500 TABLET, FILM COATED ORAL at 09:46

## 2021-09-06 RX ADMIN — LORATADINE 10 MG: 10 TABLET ORAL at 09:46

## 2021-09-06 RX ADMIN — VANCOMYCIN HYDROCHLORIDE 1000 MG: 1 INJECTION, SOLUTION INTRAVENOUS at 06:14

## 2021-09-06 RX ADMIN — Medication 1000 UNITS: at 09:45

## 2021-09-06 RX ADMIN — CALCIUM 1 TABLET: 500 TABLET ORAL at 09:50

## 2021-09-06 RX ADMIN — GABAPENTIN 100 MG: 100 CAPSULE ORAL at 22:26

## 2021-09-06 RX ADMIN — DIVALPROEX SODIUM 1000 MG: 500 TABLET, EXTENDED RELEASE ORAL at 01:08

## 2021-09-06 RX ADMIN — LISINOPRIL 10 MG: 10 TABLET ORAL at 09:46

## 2021-09-06 RX ADMIN — CYANOCOBALAMIN TAB 500 MCG 1000 MCG: 500 TAB at 09:45

## 2021-09-06 RX ADMIN — HYDROXYZINE HYDROCHLORIDE 50 MG: 25 TABLET ORAL at 22:26

## 2021-09-06 RX ADMIN — LEVOTHYROXINE SODIUM 125 MCG: 25 TABLET ORAL at 05:47

## 2021-09-06 RX ADMIN — DIVALPROEX SODIUM 500 MG: 500 TABLET, EXTENDED RELEASE ORAL at 09:46

## 2021-09-06 RX ADMIN — QUETIAPINE FUMARATE 400 MG: 50 TABLET, EXTENDED RELEASE ORAL at 01:38

## 2021-09-06 RX ADMIN — LEVETIRACETAM 1500 MG: 500 TABLET, FILM COATED ORAL at 18:05

## 2021-09-06 RX ADMIN — LIDOCAINE HYDROCHLORIDE,EPINEPHRINE BITARTRATE 30 ML: 10; .01 INJECTION, SOLUTION INFILTRATION; PERINEURAL at 08:47

## 2021-09-06 RX ADMIN — CALCIUM 1 TABLET: 500 TABLET ORAL at 16:40

## 2021-09-06 RX ADMIN — HYDROXYZINE HYDROCHLORIDE 50 MG: 25 TABLET ORAL at 09:46

## 2021-09-06 RX ADMIN — VANCOMYCIN HYDROCHLORIDE 1000 MG: 1 INJECTION, SOLUTION INTRAVENOUS at 18:07

## 2021-09-06 RX ADMIN — HYDROXYZINE HYDROCHLORIDE 50 MG: 25 TABLET ORAL at 01:08

## 2021-09-06 RX ADMIN — POLYETHYLENE GLYCOL 3350 17 G: 17 POWDER, FOR SOLUTION ORAL at 09:51

## 2021-09-06 RX ADMIN — BENZTROPINE MESYLATE 0.5 MG: 0.5 TABLET ORAL at 18:05

## 2021-09-06 RX ADMIN — PRAVASTATIN SODIUM 80 MG: 40 TABLET ORAL at 16:40

## 2021-09-06 RX ADMIN — HYDROXYZINE HYDROCHLORIDE 50 MG: 25 TABLET ORAL at 16:40

## 2021-09-06 RX ADMIN — AZTREONAM 2000 MG: 2 INJECTION, POWDER, LYOPHILIZED, FOR SOLUTION INTRAMUSCULAR; INTRAVENOUS at 22:21

## 2021-09-06 RX ADMIN — SODIUM CHLORIDE, SODIUM GLUCONATE, SODIUM ACETATE, POTASSIUM CHLORIDE, MAGNESIUM CHLORIDE, SODIUM PHOSPHATE, DIBASIC, AND POTASSIUM PHOSPHATE 150 ML/HR: .53; .5; .37; .037; .03; .012; .00082 INJECTION, SOLUTION INTRAVENOUS at 01:06

## 2021-09-06 RX ADMIN — AZTREONAM 2000 MG: 2 INJECTION, POWDER, LYOPHILIZED, FOR SOLUTION INTRAMUSCULAR; INTRAVENOUS at 16:39

## 2021-09-06 RX ADMIN — GABAPENTIN 100 MG: 100 CAPSULE ORAL at 01:08

## 2021-09-06 RX ADMIN — QUETIAPINE FUMARATE 200 MG: 50 TABLET, EXTENDED RELEASE ORAL at 16:40

## 2021-09-06 RX ADMIN — TEMAZEPAM 15 MG: 15 CAPSULE ORAL at 22:26

## 2021-09-06 RX ADMIN — QUETIAPINE FUMARATE 200 MG: 50 TABLET, EXTENDED RELEASE ORAL at 09:44

## 2021-09-06 RX ADMIN — SODIUM CHLORIDE, SODIUM GLUCONATE, SODIUM ACETATE, POTASSIUM CHLORIDE, MAGNESIUM CHLORIDE, SODIUM PHOSPHATE, DIBASIC, AND POTASSIUM PHOSPHATE 150 ML/HR: .53; .5; .37; .037; .03; .012; .00082 INJECTION, SOLUTION INTRAVENOUS at 10:02

## 2021-09-06 NOTE — WOUND OSTOMY CARE
Consult Note - Wound   Carlos Banks 49 y o  male MRN: [id number]  Unit/Bed#: -01 Encounter: [id number]        History and Present Illness:  Patient admitted with sepsis  Wound care consulted for wound to right buttock  Patient history significant for DDM2, generalized convulsive epilepsy, cerebral palsy, scoliosis, and cerebral paresis with homolateral ataxia  Patient is poor historian due to chronic cognitive impairment  Patient was reported to be lethargic and increased falls at home  Assessment Findings:   Patient in bed, surgical I&D of left buttock abscess in progress  Wound appears to be evolving DTI  See surgical note for wound details  Primary RN at bedside assisting with I&D  Patient awake and alert  Patient is dependent for care  Patient is not documented as incontinent of urine or stool  1  Bilateral heels intact  2  POA-two wounds, unknown etiology, dry yellow crusted wounds to the right upper back on the scapula and right lower back  No drainage or open areas in the wound beds  Wounds are linear, cannot rule out pressure related injuries  3  POA-evolving DTI to the left buttock with abscess  Surgical I&D being done at bedside  Wound bed full thickness, beefy red, necrotic tissue noted to the edges, thick tan purulent drainage noted when PA-C expressed drainage during I&D  Outer edge of wound purple/maroon with induration noted out 8 cm  Suspect when wound fully evolves a stage 3, stage 4 or unstageable pressure injury will be revealed   4  POA-intact scabbed area to the right knee    Skin and Wound Care Plan:   1  Apply skin nourishing cream to the skin daily  2  Elevate heels off of bed with pillows to offload  3  P-500 low air loss therapy surface  4  Turn and reposition patient Q2 hours, avoid placing patient on left side, use green wedges to offload left buttocks  5   Allevyn life heel foams to bilateral heels, sally with P, date, and peel back daily for skin assessment, change every 3 days or with soilage or dislodgement  6 Cleanse wounds to right upper back and right lower back with NSS, cover with Allevyn life silicone bordered dressing, sally with T, date, change every 3 days or with soilage or dislodgement  7  Apply 3M No Sting barrier film to right knee scabbed areas     Wounds:  Wound 09/05/21 Pressure Injury Buttocks Left (Active)   Wound Image   09/06/21 0909   Wound Description Beefy red;Dark edges;Drainage;Fragile; Necrotic 09/06/21 0909   Pressure Injury Stage DTPI 09/06/21 0909   Dee-wound Assessment Edema; Erythema;Fragile; Induration 09/06/21 0909   Wound Length (cm) 5 cm 09/06/21 0909   Wound Width (cm) 3 cm 09/06/21 0909   Wound Depth (cm) 2 cm 09/06/21 0909   Wound Surface Area (cm^2) 15 cm^2 09/06/21 0909   Wound Volume (cm^3) 30 cm^3 09/06/21 0909   Calculated Wound Volume (cm^3) 30 cm^3 09/06/21 0909   Drainage Amount Moderate 09/06/21 0909   Drainage Description Purulent; Tan 09/06/21 0909   Non-staged Wound Description Full thickness 09/06/21 0909   Dressing ABD; Moist to Moist 09/06/21 0909   Patient Tolerance Tolerated well 09/06/21 0909   Dressing Status Clean;Dry; Intact 09/05/21 2357       Wound 09/06/21 Back Lateral;Right;Upper (Active)   Wound Description Dry;Yellow 09/06/21 0924   Dee-wound Assessment Dry; Intact 09/06/21 0924   Wound Length (cm) 3 2 cm 09/06/21 0924   Wound Width (cm) 0 5 cm 09/06/21 0924   Wound Depth (cm) 0 cm 09/06/21 0924   Wound Surface Area (cm^2) 1 6 cm^2 09/06/21 0924   Wound Volume (cm^3) 0 cm^3 09/06/21 0924   Calculated Wound Volume (cm^3) 0 cm^3 09/06/21 0924   Drainage Amount None 09/06/21 0924   Dressing Foam, Silicon (eg  Allevyn, etc) 09/06/21 0924   Dressing Changed New 09/06/21 0924   Patient Tolerance Tolerated well 09/06/21 0924       Wound 09/06/21 Back Right; Lower (Active)   Wound Description Dry;Yellow 09/06/21 0924   Dee-wound Assessment Intact 09/06/21 0924   Wound Length (cm) 0 5 cm 09/06/21 0924   Wound Width (cm) 3 cm 09/06/21 0924   Wound Depth (cm) 0 cm 09/06/21 0924   Wound Surface Area (cm^2) 1 5 cm^2 09/06/21 0924   Wound Volume (cm^3) 0 cm^3 09/06/21 0924   Calculated Wound Volume (cm^3) 0 cm^3 09/06/21 0924   Drainage Amount None 09/06/21 0924   Dressing Foam, Silicon (eg   Allevyn, etc) 09/06/21 0924   Dressing Changed New 09/06/21 0924   Patient Tolerance Tolerated well 09/06/21 0924       Wound 09/06/21 Traumatic Abrasion(s) Knee Anterior;Right (Active)   Wound Image   09/06/21 0924   Wound Description Dry;Beefy red;Brown 09/06/21 0924   Dee-wound Assessment Pink 09/06/21 0924   Wound Length (cm) 1 cm 09/06/21 0924   Wound Width (cm) 0 6 cm 09/06/21 0924   Wound Depth (cm) 0 cm 09/06/21 0924   Wound Surface Area (cm^2) 0 6 cm^2 09/06/21 0924   Wound Volume (cm^3) 0 cm^3 09/06/21 0924   Calculated Wound Volume (cm^3) 0 cm^3 09/06/21 0924   Drainage Amount None 09/06/21 0924   Dressing Protective barrier 09/06/21 0924   Patient Tolerance Tolerated well 09/06/21 0924     Reviewed plan of care with primary DOUGLAS Garcia  Recommendations written as orders  Wound care team to follow weekly while admitted  Questions or concerns [address] Nurse    Von Rodriguez BSN, RN, [facility], [facility]

## 2021-09-06 NOTE — CONSULTS
Mike Ruthjaspalaleksandr [address] [facility] 1965, 49 y o  male MRN: [id number]  Unit/Bed#: -01 Encounter: [id number]  Primary Care Provider: Cherise Gibbs PA-C   Date and time admitted to hospital: 9/5/2021  5:40 PM    Inpatient consult to Acute Care Surgery  Consult performed by: Btesy Lieberman PA-C  Consult ordered by: Andrea Eaton PA-C          Gluteal abscess  Assessment & Plan  51-year-old male history of CP residing with a caretaker presenting for sepsis secondary to large gluteal abscess on the left side now hospital day 2  Unable to provide history, denies pain  8 x 8 cm area of erythematous ischemic changes with central 4 x 4 cm area of necrosis with purulent drainage over the left buttock  T 98 4 (Tmax 101), , RR 18 /58, SpO2 96% RA  WBC 16 9 (16 8), Hgb 9 9 (10 8), Plt 212 (229)    CT pelvis 9/5  Phlegmonous change/early abscess overlying the left gluteus bree muscle measuring 3 4 x 8 4 cm in cross-section and 9 7 cm in craniocaudal dimension    Assessment:  Large left buttock abscess with full-thickness skin necrosis secondary to suspected infected decubitus ulcer over left ischium  Plan:  Recommendation made for bedside incision and drainage as well as debridement of necrotic tissue  Details of the procedure and associated risks with anesthesia, bleeding, infection, additional procedures, prolonged wound healing were explained to the patient as well as his Via Rin Mckenzie over the phone who provided verbal consent  Case reviewed with Dr Yang ortega and procedure completed by myself at the bedside without complications  Additional wound culture obtained  Continue antibiotics per primary team, Vanco Day#2  Trend vitals and monitor labs  Wound care with wet-to-dry dressings changed b i d   Might consider serial debridements, eventual wound VAC        History of Present Illness   HPI:  Juan Rowley is a 49 y o  male unable to provide history, history obtained from ER documentation  Discussion of patient with his Heber Gillepsie could not provide additional details as he does not reside with her  Per ED notes patient presented with lethargy/falls and signs of sepsis, exam and CT confirmed gluteal abscess as likely cause  Review of Systems   Unable to perform ROS: Mental status change       Historical Information   Past Medical History:   Diagnosis Date    ADRIANA (acute kidney injury) ([place] [address] ) 9/24/2019    [facility])     Depression     Diabetes ([place] [address] )     Disease of thyroid gland     Gait disorder     Hyperlipidemia     Hypertension     Kidney failure     Kidney stones     Osteoporosis     Psychiatric disorder     Scoliosis     Seizures ([place] [address] )     Thyroid disease     UTI (urinary tract infection)      Past Surgical History:   Procedure Laterality Date    APPENDECTOMY       Social History   Social History     Substance and Sexual Activity   Alcohol Use Never     Social History     Substance and Sexual Activity   Drug Use No     Social History     Tobacco Use   Smoking Status Never Smoker   Smokeless Tobacco Never Used     E-Cigarette/Vaping    E-Cigarette Use Never User      E-Cigarette/Vaping Substances     Family History: History reviewed  No pertinent family history  Meds/Allergies   PTA meds:   Prior to Admission Medications   Prescriptions Last Dose Informant Patient Reported? Taking?    Blood Glucose Monitoring Suppl (Clay Juan) w/Device KIT Unknown at Unknown time Care Giver No No   Sig: by Does not apply route 3 (three) times a day TEST BLOOD SUGARS THREE TIMES   Depakote  MG 24 hr tablet Unknown at Unknown time  No No   Sig: TAKE 1 TABLET BY MOUTH IN THE MORNING *BRAND NECESSARY*;TAKE 2 TABLETS BY MOUTH AT BEDTIME   Humidifiers (Cool Mist Humidifier 1 gallon) MISC   No No   Sig: Use daily at bedtime   Insulin Pen Needle (PEN NEEDLES) 31G X 5 MM MISC Unknown at Unknown time Care Giver No No   Sig: by Does not apply route daily   Multiple Vitamin (Daily-Reina) TABS Unknown at Unknown time  No No   Sig: TAKE 1 TABLET BY MOUTH DAILY (8AM)   OneTouch Delica Lancets 20J MISC Unknown at Unknown time Care Giver No No   Sig: by Does not apply route daily TEST BLOOD SUGARS THREE TIMES DAILY   QUEtiapine (SEROquel XR) 200 mg 24 hr tablet Unknown at Unknown time Care Giver No No   Sig: TAKE 1 TABLET BY MOUTH TWICE A DAY (8AM,2PM)   QUEtiapine (SEROquel XR) 400 mg 24 hr tablet Unknown at Unknown time Care Giver No No   Sig: TAKE 1 TABLET BY MOUTH AT BEDTIME (8PM) (DX: ANTIPSYCHOTIC)   Stool Softener 100 MG capsule Unknown at Unknown time  No No   Sig: TAKE 1 CAPSULE BY MOUTH TWICE A DAY (8AM,8PM) (DX: CONSTIPATION)   Vimpat 200 MG tablet Unknown at Unknown time  No No   Sig: TAKE 1 TABLET BY MOUTH EVERY 12 HOURS (DX: EPILEPSY)   benzonatate (TESSALON) 200 MG capsule Unknown at Unknown time  No No   Sig: Take 1 capsule (200 mg total) by mouth 3 (three) times a day as needed for cough   benztropine (COGENTIN) 0 5 mg tablet Unknown at Unknown time  No No   Sig: TAKE 1 TABLET BY MOUTH TWICE A DAY (8AM,8PM)   calcium carbonate (OYSTER SHELL,OSCAL) 500 mg Unknown at Unknown time  No No   Sig: TAKE 1 TABLET BY MOUTH 3 TIMES A DAY (8AM,2PM,8PM)   cholecalciferol (VITAMIN D3) 1,000 units tablet Unknown at Unknown time  No No   Sig: TAKE 1 TABLET BY MOUTH DAILY (8AM) (DX: VITAMIN DAILY DEFICIENCY)   clonazePAM (KlonoPIN) 0 25 MG disintegrating tablet Unknown at Unknown time  No No   Sig: Take 1 tablet (0 25 mg total) by mouth as needed (for clusters of myoclonus   can repeat once after 1 hour if myoclonus continues )   gabapentin (NEURONTIN) 100 mg capsule Unknown at Unknown time Care Giver Yes No   Sig: Take 100 mg by mouth 3 (three) times a day    glucose blood (OneTouch Verio) test strip Unknown at Unknown time Care Giver No No   Sig: TEST BLOOD SUGARS THREE TIMES DAILY   hydrOXYzine pamoate (VISTARIL) 50 mg capsule Unknown at Unknown time Care Giver No No   Sig: TAKE 1 CAPSULE BY MOUTH 3 TIMES A DAY   ibuprofen (MOTRIN) 600 mg tablet Unknown at Unknown time Care Giver No No   Sig: Take 1 tablet (600 mg total) by mouth every 8 (eight) hours   ketoconazole (NIZORAL) 2 % shampoo Unknown at Unknown time Care Giver No No   Sig: Wash hair and sideburns at least 3 times per week   levETIRAcetam (KEPPRA) 750 mg tablet Unknown at Unknown time  No No   Sig: Take 2 tablets (1,500 mg total) by mouth 2 (two) times a day   levothyroxine 125 mcg tablet Unknown at Unknown time  No No   Sig: TAKE 1 TABLET BY MOUTH DAILY (8AM) (DX: OTHER SPECIFIED HYPOTHYROIDISM)   lisinopril (ZESTRIL) 10 mg tablet Unknown at Unknown time  No No   Sig: TAKE 1 TABLET BY MOUTH DAILY (8AM) (DX: HYPERTENSION)   loratadine (CLARITIN) 10 mg tablet Unknown at Unknown time Care Giver No No   Sig: One tab daily as needed for allergy symptoms   metFORMIN (GLUCOPHAGE) 1000 MG tablet Unknown at Unknown time  No No   Sig: TAKE 1 TABLET BY MOUTH TWICE A DAY WITH MEALS (8AM, 8PM) (DX: DIABETES MELLITUS)   polyethylene glycol (MIRALAX) 17 g packet Unknown at Unknown time Care Giver No No   Sig: Take 17 g by mouth daily   simvastatin (ZOCOR) 40 mg tablet Unknown at Unknown time  No No   Sig: TAKE 1 TABLET BY MOUTH DAILY (DX: HYPERLIPIDEMIA)   sodium chloride (OCEAN) 0 65 % nasal spray   No No   Si spray into each nostril as needed for congestion   temazepam (RESTORIL) 15 mg capsule Unknown at Unknown time  Yes No   Sig: Take 15 mg by mouth daily at bedtime as needed for sleep    temazepam (RESTORIL) 7 5 mg capsule Unknown at Unknown time Care Giver Yes No   Sig: Take 15 mg by mouth daily at bedtime as needed for sleep   triamcinolone (KENALOG) 0 1 % cream Unknown at Unknown time Care Giver No No   Sig: Apply topically 2 (two) times a day   vitamin B-12 (VITAMIN B-12) 1,000 mcg tablet Unknown at Unknown time  No No   Sig: TAKE 1 TABLET BY MOUTH EVERY OTHER DAY (8AM) (DX: DEFICIENCY OF OTHER SPECIFIED B GROUP VITAMINS)      Facility-Administered Medications: None     Allergies   Allergen Reactions    Erythromycin Other (See Comments)     Unknown per pt    Penicillins Other (See Comments)     Unknown per pt       Objective   First Vitals:   Blood Pressure: 144/61 (09/05/21 1746)  Pulse: (!) 118 (09/05/21 1746)  Temperature: (!) 100 9 °F (38 3 °C) (09/05/21 1746)  Temp Source: Tympanic (09/05/21 1910)  Respirations: 20 (09/05/21 1746)  Height: 6' (182 9 cm) (09/05/21 1746)  Weight - Scale: 65 8 kg (145 lb) (09/05/21 1746)  SpO2: 99 % (09/05/21 1746)    Current Vitals:   Blood Pressure: 127/58 (09/06/21 0622)  Pulse: 100 (09/06/21 0622)  Temperature: 98 4 °F (36 9 °C) (09/06/21 0622)  Temp Source: Temporal (09/06/21 0622)  Respirations: 18 (09/06/21 0000)  Height: 6' (182 9 cm) (09/05/21 2357)  Weight - Scale: 74 5 kg (164 lb 3 9 oz) (09/05/21 2357)  SpO2: 96 % (09/06/21 0622)      Intake/Output Summary (Last 24 hours) at 9/6/2021 0932  Last data filed at 9/5/2021 2338  Gross per 24 hour   Intake 2206 67 ml   Output --   Net 2206 67 ml       Invasive Devices     Peripheral Intravenous Line            Peripheral IV 09/05/21 Left Forearm <1 day    Peripheral IV 09/05/21 Right Arm <1 day                Physical Exam  Vitals and nursing note reviewed  Constitutional:       General: He is not in acute distress  Appearance: He is well-developed  He is not diaphoretic  Comments: Lethargic, somnolent, not oriented to place/time/event  Unable to recall reason for hospitalization or presence of infection  Pulmonary:      Effort: No respiratory distress  Musculoskeletal:      Cervical back: Normal range of motion  Skin:     General: Skin is warm and dry  Capillary Refill: Capillary refill takes less than 2 seconds  Comments: 8 x 8 cm area of erythematous skin changes  Centrally located 4 x 4 cm area of skin necrosis   5 mm area of spontaneous purulent drainage  Tender to touch  Neurological:      Mental Status: He is alert  Lab Results:   I have personally reviewed pertinent lab results  , CBC:   Lab Results   Component Value Date    WBC 16 90 (H) 09/06/2021    HGB 9 9 (L) 09/06/2021    HCT 29 2 (L) 09/06/2021    MCV 95 09/06/2021     09/06/2021    MCH 32 2 09/06/2021    MCHC 33 8 09/06/2021    RDW 13 3 09/06/2021    MPV 8 6 09/06/2021   , CMP:   Lab Results   Component Value Date    SODIUM 132 (L) 09/06/2021    K 3 7 09/06/2021    CL 97 (L) 09/06/2021    CO2 28 09/06/2021    BUN 9 09/06/2021    CREATININE 0 50 (L) 09/06/2021    CALCIUM 8 1 (L) 09/06/2021    AST 36 09/05/2021    ALT 30 09/05/2021    ALKPHOS 50 09/05/2021    EGFR 123 09/06/2021     Imaging: I have personally reviewed pertinent reports  EKG, Pathology, and Other Studies: I have personally reviewed pertinent reports  Code Status: Level 1 - Full Code  Advance Directive and Living Will:      Power of :    POLST:      Counseling / Coordination of Care  Total floor / unit time spent today 45 minutes  Greater than 50% of total time was spent with the patient and / or family counseling and / or coordination of care  A description of the counseling / coordination of care: procedure

## 2021-09-06 NOTE — ASSESSMENT & PLAN NOTE
Lab Results   Component Value Date    HGBA1C 5 4 10/15/2020       Recent Labs     09/06/21  0007   POCGLU 124       Blood Sugar Average: Last 72 hrs:  (P) 124     Will place on [facility] step 2 diet, hold pre-hospital metformin obtain Accu-Cheks q 6 hours with Humalog correction dose q 6

## 2021-09-06 NOTE — PROGRESS NOTES
Vancomycin Assessment    Zeyad Watkins is a 49 y o  male who is currently receiving vancomycin 1000 mg iv once (given) then 1000 mg iv q 12 hours for skin-soft tissue infection     Relevant clinical data and objective history reviewed:  Creatinine   Date Value Ref Range Status   09/05/2021 0 67 (L) 0 70 - 1 30 mg/dL Final     Comment:     Standardized to IDMS reference method   05/18/2021 0 63 (L) 0 70 - 1 30 mg/dL Final     Comment:     Standardized to IDMS reference method   01/09/2021 0 73 0 70 - 1 30 mg/dL Final     Comment:     Standardized to IDMS reference method     /62 (BP Location: Right arm)   Pulse 90   Temp 98 °F (36 7 °C) (Temporal)   Resp 18   Ht 6' (1 829 m)   Wt 74 5 kg (164 lb 3 9 oz)   SpO2 93%   BMI 22 28 kg/m²   No intake/output data recorded  Lab Results   Component Value Date/Time    BUN 13 09/05/2021 07:06 PM    WBC 16 80 (H) 09/05/2021 07:06 PM    HGB 10 8 (L) 09/05/2021 07:06 PM    HCT 32 7 (L) 09/05/2021 07:06 PM    MCV 95 09/05/2021 07:06 PM     09/05/2021 07:06 PM     Temp Readings from Last 3 Encounters:   09/06/21 98 °F (36 7 °C) (Temporal)   05/18/21 97 9 °F (36 6 °C) (Tympanic)   05/18/21 (!) 96 9 °F (36 1 °C)     Vancomycin Days of Therapy: 1    Assessment/Plan  The patient is currently on vancomycin utilizing scheduled dosing based on actual body weight  Baseline risks associated with therapy include: concomitant nephrotoxic medications  The patient is currently receiving 1000 mg iv once (given) then 1000 mg iv q 12 hours and after clinical evaluation will be changed to 1000 mg iv q 8 hours  Pharmacy will also follow closely for s/sx of nephrotoxicity, infusion reactions, and appropriateness of therapy  BMP and CBC will be ordered per protocol  Plan for trough as patient approaches steady state, prior to the 4th  dose at approximately 21:30 on 9/06/2021  Due to infection severity, will target a trough of 15-20 (appropriate for most indications)   Pharmacy will continue to follow the patients culture results and clinical progress daily      Olesya Shelby, Pharmacist

## 2021-09-06 NOTE — ASSESSMENT & PLAN NOTE
Continue pre-hospital Keppra 1500 mg p o  B i d , Depakote  mg p o   Daily and 1000 mg p o  Q h s , Klonopin 0 25 mg p o  B i d  and Vimpat 200 mg p o  B i d

## 2021-09-06 NOTE — ASSESSMENT & PLAN NOTE
· Admit to Medicine  · Patient received sepsis protocol IV fluid resuscitation followed by a slight at 150 cc/hour  · Blood cultures and urine culture currently pending  · Will continue vancomycin 1 g IV b i d  And consult pharmacy to dose  · Will consult General surgery in a m  · Trend lactic acid procalcitonin  · Consult general surgery in a m    · Patient met sepsis criteria in that he was febrile with a T-max of 101, tachycardic with heart rate with heart rate as high as 118, tachypneic with respiratory rate as high as 26, leukocytosis of 16 8 unknown source of infection being a left gluteal abscess

## 2021-09-06 NOTE — PROCEDURES
Incision and drain    Date/Time: 9/6/2021 9:36 AM  Performed by: Garett Patel PA-C  Authorized by: Garett Patel PA-C   Universal Protocol:  Consent: Verbal consent obtained  Risks and benefits: risks, benefits and alternatives were discussed  Consent given by: guardian and power of   Time out: Immediately prior to procedure a "time out" was called to verify the correct patient, procedure, equipment, support staff and site/side marked as required  Timeout called at: 9/6/2021 8:45 AM   Patient identity confirmed: verbally with patient, arm band and provided demographic data      Patient location:  Bedside  Location:     Type:  Abscess    Size:  8 cm    Location:  Lower extremity    Lower extremity location:  L buttock  Pre-procedure details:     Skin preparation:  Betadine  Anesthesia (see MAR for exact dosages): Anesthesia method:  Local infiltration    Local anesthetic:  Lidocaine 1% WITH epi (20 mL)  Procedure details:     Complexity:  Complex    Needle aspiration: no      Incision types:  Elliptical    Scalpel blade:  11    Approach:  Open    Incision depth:  Subcutaneous    Wound management:  Probed and deloculated, irrigated with saline, extensive cleaning and debrided    Irrigation with saline:  200 mL    Hemostat:  Good    Drainage:  Purulent    Drainage amount:  Copious    Wound treatment:  Packing placed    Packing material: saline moistened 3" cling wrap  Post-procedure details:     Patient tolerance of procedure: Tolerated well, no immediate complications  Comments:      Informed verbal consent obtained over the phone by the patient's guardian and power of , his Charla Jewett, this was witnessed by the RN  Patient positioned and right lateral decubitus position  Area identified and prepped with Betadine solution x2 then draped with a fenestrated drape  Local anesthetic administered utilizing 20 mL 1% xylocaine with epinephrine in a field block  Straight incision made through the area of skin necrosis using 11 blade scalpel  Incision carried down through full thickness of the skin with immediate drainage of thick purulent material which was then cultured  The wound and subcutaneous tissue explored digitally with multiple loculations identified along the periphery  Loculations gently broken to expose pockets however some remained due to concern about vascular structures and vessel bleeding  Cavity and pockets flushed with total of 200 mL sterile water irrigation  Expression of purulent material through manual compression of the surrounding tissue was utilized to drain as much purulence as possible the time of the procedure  Remainder of necrotic tissue sharply excised with iris scissors and elliptical shape close to the edge of viable tissue, however not immediately to that viable tissue due to the risk of bleeding  Wound bed also debrided of necrotic tissue with what appeared to be exposed healthy gluteus at the wound base  Wound measured 5 cm x 3 cm x 2 cm deep with circumferential undermining and tunneling  Wound was then packed with saline moistened 3 in Kerlix gently rolled into the wound  Surrounding periwound treated with thick application of Z guard paste  Surrounding skin prepped with skin prep  Dressed with Adaptic gauze x3, dry 4 x 4 gauze, ABD pad x2 then secured with tape  Tolerated well without immediate complications  Patient's RN present for the procedure as well as our wound care RN

## 2021-09-06 NOTE — SEPSIS NOTE
Sepsis Note   Carlos Banks 49 y o  male MRN: [id number]  Unit/Bed#: ED 02 Encounter: [id number]      qSOFA     Row Name 09/05/21 2245 09/05/21 2230 09/05/21 2045 09/05/21 1845 09/05/21 1830    Altered mental status GCS < 15  --  --  --  --  --    Respiratory Rate > / =22  0  0  0  1  0    Systolic BP < / =858  --  --  0  0  --    Q Sofa Score  0  0  0  1  0    Row Name 09/05/21 1800 09/05/21 1746             Altered mental status GCS < 15  --  --       Respiratory Rate > / =22  1  0       Systolic BP < / =743  0  0       Q Sofa Score  1  0

## 2021-09-06 NOTE — H&P
[facility]  H&P- Gloriann Krabbe 1965, 49 y o  male MRN: [id number]  Unit/Bed#: -01 Encounter: [id number]  Primary Care Provider: Dudley Bueno PA-C   Date and time admitted to hospital: 9/5/2021  5:40 PM    * Sepsis [facility])  Assessment & Plan  · Admit to Medicine  · Patient received sepsis protocol IV fluid resuscitation followed by a slight at 150 cc/hour  · Blood cultures and urine culture currently pending  · Will continue vancomycin 1 g IV b i d  And consult pharmacy to dose  · Will consult General surgery in a m  · Trend lactic acid procalcitonin  · Consult general surgery in a m  · Patient met sepsis criteria in that he was febrile with a T-max of 101, tachycardic with heart rate with heart rate as high as 118, tachypneic with respiratory rate as high as 26, leukocytosis of 16 8 unknown source of infection being a left gluteal abscess    Gluteal abscess  Assessment & Plan  · Will treat as per above  · Patient is NPO after midnight  · Consult general surgery in a m  Vitamin D deficiency  Assessment & Plan  Continue pre-hospital vitamin D3 1000 units p o  Daily    Acquired hypothyroidism  Assessment & Plan  Continue pre-hospital levothyroxine 125 mcg p o  Daily    Essential hypertension  Assessment & Plan  Continue pre-hospital lisinopril 10 mg p o  Daily    Hyperlipidemia  Assessment & Plan  Substitute Pravachol 80 mg p o  Daily for pre-hospital Zocor    Generalized convulsive epilepsy ([facility] [address] )  Assessment & Plan  Continue pre-hospital Keppra 1500 mg p o  B i d , Depakote  mg p o  Daily and 1000 mg p o  Q h s , Klonopin 0 25 mg p o  B i d  and Vimpat 200 mg p o  B i d      Type 2 diabetes mellitus without complication, without long-term current use of insulin [facility])  Assessment & Plan  Lab Results   Component Value Date    HGBA1C 5 4 10/15/2020       Recent Labs     09/06/21  0007   POCGLU 124       Blood Sugar Average: Last 72 hrs:  (P) 124     Will place on [address] step 2 diet, hold pre-hospital metformin obtain Accu-Cheks q 6 hours with Humalog correction dose q 6    VTE Prophylaxis: SCDs only at this time until cleared by General surgery  Code Status:  Level 1  Discussion with family:  None present at bedside at time of exam, attempted to reach out to both caregiver as well as sister listed as emergency contact with no answer and left message on sister's voice mail    Anticipated Length of Stay:  Patient will be admitted on an Inpatient basis with an anticipated length of stay of  > 2 midnights  Justification for Hospital Stay:  Sepsis secondary to gluteal abscess requiring IV fluid resuscitation, IV antibiotics and surgical evaluation    Total Time for Visit, including Counseling / Coordination of Care: 1 hour  Greater than 50% of this total time spent on direct patient counseling and coordination of care  Chief Complaint:   Lethargy multiple falls at home    History of Present Illness:    Shawanda Ovalles is a 49 y o  male who presents with lethargy multiple falls at home  Patient is a poor historian so history is obtained through review of ER documentation as well as previous records  Patient was brought to the ER for further evaluation treatment is 1 day history of lethargy as well as increasing falls at home complaining of buttock tenderness  Patient has an unstageable sacral decubitus ulcer which he tells me that he follows with wound care for though no notes from them are available for review  Patient is reportedly been feeling generally weak and increased somnolence  There is no report of any fever chills the patient was febrile upon arrival in the ER  Review of Systems:    Review of Systems   Unable to perform ROS: Mental status change   Constitutional: Positive for fever  All other systems reviewed and are negative        Past Medical and Surgical History:     Past Medical History:   Diagnosis Date    ADRIANA (acute kidney injury) ([facility] [address] ) 9/24/2019  CP [facility]re[facility])     Depression     [email or web address]     Disease of thyroid gland     Gait disorder     Hyperlipidemia     Hypertension     Kidney failure     Kidney stones     Osteoporosis     Psychiatric disorder     Scoliosis     Seizures ([email or web address] )     Thyroid disease     UTI (urinary tract infection)        Past Surgical History:   Procedure Laterality Date    APPENDECTOMY         Meds/Allergies:    Prior to Admission medications    Medication Sig Start Date End Date Taking? Authorizing Provider   benzonatate (TESSALON) 200 MG capsule Take 1 capsule (200 mg total) by mouth 3 (three) times a day as needed for cough 11/10/20   Jaclyn Pinch, CRNP   benztropine (COGENTIN) 0 5 mg tablet TAKE 1 TABLET BY MOUTH TWICE A DAY (8AM,8PM) 7/15/21   Lavon Angelucci, PA-C   Blood Glucose Monitoring Suppl (Shikha Banks) w/Device KIT by Does not apply route 3 (three) times a day TEST BLOOD SUGARS THREE TIMES 7/31/20   Jaclyn Holland CRNP   calcium carbonate (OYSTER SHELL,OSCAL) 500 mg TAKE 1 TABLET BY MOUTH 3 TIMES A DAY (8AM,2PM,8PM) 5/10/21   Lavon Angelucci, PA-C   cholecalciferol (VITAMIN D3) 1,000 units tablet TAKE 1 TABLET BY MOUTH DAILY (8AM) (DX: VITAMIN DAILY DEFICIENCY) 7/15/21   Lavon Angelucci, PA-C   clonazePAM (KlonoPIN) 0 25 MG disintegrating tablet Take 1 tablet (0 25 mg total) by mouth as needed (for clusters of myoclonus   can repeat once after 1 hour if myoclonus continues ) 5/25/21   Airam Burnette MD   Depakote  MG 24 hr tablet TAKE 1 TABLET BY MOUTH IN THE MORNING *BRAND NECESSARY*;TAKE 2 TABLETS BY MOUTH AT BEDTIME 5/14/21   Airam Burnette MD   gabapentin (NEURONTIN) 100 mg capsule Take 100 mg by mouth 3 (three) times a day  6/26/20   Historical Provider, MD   glucose blood (OneTouch Verio) test strip TEST BLOOD SUGARS THREE TIMES DAILY 7/31/20   Jaclyn Holland, CRNP   Humidifiers [facility] Mist Humidifier 1 gallon) MISC Use daily at bedtime 11/10/20 2/17/21  Lucy Melendez HUNTER Richey   hydrOXYzine pamoate (VISTARIL) 50 mg capsule TAKE 1 CAPSULE BY MOUTH 3 TIMES A DAY 3/25/20   HUNTER Gonsales   ibuprofen (MOTRIN) 600 mg tablet Take 1 tablet (600 mg total) by mouth every 8 (eight) hours 7/31/20   HUNTER Gonsales   Insulin Pen Needle (PEN NEEDLES) 31G X 5 MM MISC by Does not apply route daily 7/13/20   HUNTER Gonsales   ketoconazole (NIZORAL) 2 % shampoo Wash hair and sideburns at least 3 times per week 11/19/19   HUNTER Gonsales   levETIRAcetam (KEPPRA) 750 mg tablet Take 2 tablets (1,500 mg total) by mouth 2 (two) times a day 1/26/21   Jose Lr MD   levothyroxine 125 mcg tablet TAKE 1 TABLET BY MOUTH DAILY (8AM) (DX: OTHER SPECIFIED HYPOTHYROIDISM) 5/10/21   Mya Bolivar PA-C   lisinopril (ZESTRIL) 10 mg tablet TAKE 1 TABLET BY MOUTH DAILY (8AM) (DX: HYPERTENSION) 3/19/21   Mya Bolivar PA-C   loratadine (CLARITIN) 10 mg tablet One tab daily as needed for allergy symptoms 10/29/20   Natalie Prado PA-C   metFORMIN (GLUCOPHAGE) 1000 MG tablet TAKE 1 TABLET BY MOUTH TWICE A DAY WITH MEALS (8AM, 8PM) (DX: DIABETES MELLITUS) 4/13/21   Mya Bolivar PA-C   Multiple Vitamin (Daily-Reina) TABS TAKE 1 TABLET BY MOUTH DAILY (8AM) 5/10/21   Mya Bolivar PA-C   OneTouch Delica Lancets 86X MISC by Does not apply route daily TEST BLOOD SUGARS THREE TIMES DAILY 7/31/20   HUNTER Gonsales   polyethylene glycol (MIRALAX) 17 g packet Take 17 g by mouth daily 6/18/20   HUNTER Gonsales   QUEtiapine (SEROquel XR) 200 mg 24 hr tablet TAKE 1 TABLET BY MOUTH TWICE A DAY (8AM,2PM) 8/26/19   HUNTER Diggs   QUEtiapine (SEROquel XR) 400 mg 24 hr tablet TAKE 1 TABLET BY MOUTH AT BEDTIME (8PM) (DX: ANTIPSYCHOTIC) 9/17/19   Radha Jackson, HUNTER   simvastatin (ZOCOR) 40 mg tablet TAKE 1 TABLET BY MOUTH DAILY (DX: HYPERLIPIDEMIA) 5/10/21   Mya Bolivar PA-C   sodium chloride (OCEAN) 0 65 % nasal spray 1 spray into each nostril as needed for congestion 11/10/20 2/17/21  Demetria Dakins, CRNP   Stool Softener 100 MG capsule TAKE 1 CAPSULE BY MOUTH TWICE A DAY (8AM,8PM) (DX: CONSTIPATION) 4/13/21   Matthew Lemus PA-C   temazepam (RESTORIL) 15 mg capsule Take 15 mg by mouth daily at bedtime as needed for sleep  2/8/21   Historical Provider, MD   temazepam (RESTORIL) 7 5 mg capsule Take 15 mg by mouth daily at bedtime as needed for sleep    Historical Provider, MD   triamcinolone (KENALOG) 0 1 % cream Apply topically 2 (two) times a day 7/13/20   Demetria Dakins, CRNP   Vimpat 200 MG tablet TAKE 1 TABLET BY MOUTH EVERY 12 HOURS (DX: EPILEPSY) 7/15/21   HUNTER Francis   vitamin B-12 (VITAMIN B-12) 1,000 mcg tablet TAKE 1 TABLET BY MOUTH EVERY OTHER DAY (8AM) (DX: DEFICIENCY OF OTHER SPECIFIED B GROUP VITAMINS) 7/26/21   Matthew Lemus PA-C     all medications and allergies reviewed    Allergies:    Allergies   Allergen Reactions    Erythromycin Other (See Comments)     Unknown per pt    Penicillins Other (See Comments)     Unknown per pt       Social History:     Marital Status: Single   Occupation:  Disabled  Patient Pre-hospital Living Situation:  Resides at home  Patient Pre-hospital Level of Mobility:  Limited  Patient Pre-hospital Diet Restrictions:  Diabetic  Substance Use History:   Social History     Substance and Sexual Activity   Alcohol Use Never     Social History     Tobacco Use   Smoking Status Never Smoker   Smokeless Tobacco Never Used     Social History     Substance and Sexual Activity   Drug Use No       Family History:  I have reviewed the patient's family history    Physical Exam:     Vitals:   Blood Pressure: 126/62 (09/06/21 0000)  Pulse: 90 (09/06/21 0000)  Temperature: 98 °F (36 7 °C) (09/06/21 0000)  Temp Source: Temporal (09/06/21 0000)  Respirations: 18 (09/06/21 0000)  Height: 6' (182 9 cm) (09/05/21 2357)  Weight - Scale: 74 5 kg (164 lb 3 9 oz) (09/05/21 2357)  SpO2: 93 % (09/06/21 0000)    Physical Exam  Vitals and nursing note reviewed  Constitutional:       General: He is not in acute distress  Appearance: Normal appearance  HENT:      Head: Normocephalic and atraumatic  Right Ear: Tympanic membrane normal       Left Ear: Tympanic membrane normal       Nose: Nose normal       Mouth/Throat:      Mouth: Mucous membranes are moist       Pharynx: No oropharyngeal exudate or posterior oropharyngeal erythema  Eyes:      Extraocular Movements: Extraocular movements intact  Pupils: Pupils are equal, round, and reactive to light  Cardiovascular:      Rate and Rhythm: Normal rate and regular rhythm  Pulses: Normal pulses  Heart sounds: Normal heart sounds  Pulmonary:      Effort: Pulmonary effort is normal  No respiratory distress  Breath sounds: Normal breath sounds  Abdominal:      General: Abdomen is flat  Bowel sounds are normal       Palpations: Abdomen is soft  Musculoskeletal:         General: Normal range of motion  Cervical back: Normal range of motion and neck supple  Right lower leg: No edema  Left lower leg: No edema  Skin:     General: Skin is warm and dry  Capillary Refill: Capillary refill takes less than 2 seconds  Findings: Lesion present  Comments: Large unstageable left gluteal ulcer please see attached media   Neurological:      General: No focal deficit present  Mental Status: He is alert  Mental status is at baseline  He is disoriented  Additional Data:     Lab Results: I have personally reviewed pertinent reports        Results from last 7 days   Lab Units 09/05/21  1906   WBC Thousand/uL 16 80*   HEMOGLOBIN g/dL 10 8*   HEMATOCRIT % 32 7*   PLATELETS Thousands/uL 229   BANDS PCT % 4   LYMPHO PCT % 10*   MONO PCT % 11     Results from last 7 days   Lab Units 09/05/21  1906   SODIUM mmol/L 132*   POTASSIUM mmol/L 3 9   CHLORIDE mmol/L 92*   CO2 mmol/L 27   BUN mg/dL 13   CREATININE mg/dL 0 67*   ANION GAP mmol/L 13 CALCIUM mg/dL 8 5*   ALBUMIN g/dL 3 0*   TOTAL BILIRUBIN mg/dL 0 30   ALK PHOS U/L 50   ALT U/L 30   AST U/L 36   GLUCOSE RANDOM mg/dL 141*         Results from last 7 days   Lab Units 09/06/21  0007   POC GLUCOSE mg/dl 124         Results from last 7 days   Lab Units 09/05/21  2120 09/05/21  1906   LACTIC ACID mmol/L 1 6 3 2*       Imaging: I have personally reviewed pertinent reports  CT pelvis w contrast   Final Result by Lyly Damon MD (09/05 2241)      Phlegmonous change/early abscess overlying the left gluteus bree muscle measuring 3 4 x 8 4 cm in cross-section and 9 7 cm in craniocaudal dimension         Workstation performed: [id number]         XR pelvis ap only 1 or 2 views   ED Interpretation by Debra Humphreys DO (09/05 1959)   No acute pathology      XR chest 1 view portable    (Results Pending)         EKG, Pathology, and Other Studies Reviewed on Admission:   · EKG: N/A    Epic / Care Everywhere Records Reviewed: Yes    ** Please Note: This note has been constructed using a voice recognition system   **

## 2021-09-06 NOTE — PLAN OF CARE
Problem: MOBILITY - ADULT  Goal: Maintain or return to baseline ADL function  Description: INTERVENTIONS:  -  Assess patient's ability to carry out ADLs; assess patient's baseline for ADL function and identify physical deficits which impact ability to perform ADLs (bathing, care of mouth/teeth, toileting, grooming, dressing, etc )  - Assess/evaluate cause of self-care deficits   - Assess range of motion  - Assess patient's mobility; develop plan if impaired  - Assess patient's need for assistive devices and provide as appropriate  - Encourage maximum independence but intervene and supervise when necessary  - Involve family in performance of ADLs  - Assess for home care needs following discharge   - Consider OT consult to assist with ADL evaluation and planning for discharge  - Provide patient education as appropriate  Outcome: Progressing  Goal: Maintains/Returns to pre admission functional level  Description: INTERVENTIONS:  - Perform BMAT or MOVE assessment daily    - Set and communicate daily mobility goal to care team and patient/family/caregiver  - Collaborate with rehabilitation services on mobility goals if consulted  - Perform Range of Motion 4 times a day  - Reposition patient every 2 hours    - Dangle patient 2 times a day  - Stand patient 2 times a day  - Ambulate patient 2 times a day  - Out of bed to chair 3 times a day   - Out of bed for meals 3 times a day  - Out of bed for toileting  - Record patient progress and toleration of activity level   Outcome: Progressing     Problem: Potential for Falls  Goal: Patient will remain free of falls  Description: INTERVENTIONS:  - Educate patient/family on patient safety including physical limitations  - Instruct patient to call for assistance with activity   - Consult OT/PT to assist with strengthening/mobility   - Keep Call bell within reach  - Keep bed low and locked with side rails adjusted as appropriate  - Keep care items and personal belongings within reach  - Initiate and maintain comfort rounds  - Make Fall Risk Sign visible to staff  - Offer Toileting every 2 Hours, in advance of need  - Initiate/Maintain bed alarm  - Obtain necessary fall risk management equipment:   - Apply yellow socks and bracelet for high fall risk patients  - Consider moving patient to room near nurses station  Outcome: Progressing     Problem: Prexisting or High Potential for Compromised Skin Integrity  Goal: Skin integrity is maintained or improved  Description: INTERVENTIONS:  - Identify patients at risk for skin breakdown  - Assess and monitor skin integrity  - Assess and monitor nutrition and hydration status  - Monitor labs   - Assess for incontinence   - Turn and reposition patient  - Assist with mobility/ambulation  - Relieve pressure over bony prominences  - Avoid friction and shearing  - Provide appropriate hygiene as needed including keeping skin clean and dry  - Evaluate need for skin moisturizer/barrier cream  - Collaborate with interdisciplinary team   - Patient/family teaching  - Consider wound care consult   Outcome: Progressing     Problem: SKIN/TISSUE INTEGRITY - ADULT  Goal: Incision(s), wounds(s) or drain site(s) healing without S/S of infection  Description: INTERVENTIONS  - Assess and document dressing, incision, wound bed, drain sites and surrounding tissue  - Provide patient and family education  - Perform skin care/dressing changes every shift  Outcome: Progressing  Goal: Pressure injury heals and does not worsen  Description: Interventions:  - Implement low air loss mattress or specialty surface (Criteria met)  - Apply silicone foam dressing  - Instruct/assist with weight shifting every 30 minutes when in chair   - Limit chair time to 2 hour intervals  - Use special pressure reducing interventions such as waffle cushion when in chair   - Apply fecal or urinary incontinence containment device   - Perform passive or active ROM every shift  - Turn and reposition patient & offload bony prominences every 2 hours   - Utilize friction reducing device or surface for transfers   - Consider consults to  interdisciplinary teams such as   - Use incontinent care products after each incontinent episode    - Consider nutrition services referral as needed  Outcome: Progressing     Problem: PAIN - ADULT  Goal: Verbalizes/displays adequate comfort level or baseline comfort level  Description: Interventions:  - Encourage patient to monitor pain and request assistance  - Assess pain using appropriate pain scale  - Administer analgesics based on type and severity of pain and evaluate response  - Implement non-pharmacological measures as appropriate and evaluate response  - Consider cultural and social influences on pain and pain management  - Notify physician/advanced practitioner if interventions unsuccessful or patient reports new pain  Outcome: Progressing     Problem: INFECTION - ADULT  Goal: Absence or prevention of progression during hospitalization  Description: INTERVENTIONS:  - Assess and monitor for signs and symptoms of infection  - Monitor lab/diagnostic results  - Monitor all insertion sites, i e  indwelling lines, tubes, and drains  - Monitor endotracheal if appropriate and nasal secretions for changes in amount and color  - Lynn appropriate cooling/warming therapies per order  - Administer medications as ordered  - Instruct and encourage patient and family to use good hand hygiene technique  - Identify and instruct in appropriate isolation precautions for identified infection/condition  Outcome: Progressing  Goal: Absence of fever/infection during neutropenic period  Description: INTERVENTIONS:  - Monitor WBC    Outcome: Progressing     Problem: SAFETY ADULT  Goal: Maintain or return to baseline ADL function  Description: INTERVENTIONS:  -  Assess patient's ability to carry out ADLs; assess patient's baseline for ADL function and identify physical deficits which impact ability to perform ADLs (bathing, care of mouth/teeth, toileting, grooming, dressing, etc )  - Assess/evaluate cause of self-care deficits   - Assess range of motion  - Assess patient's mobility; develop plan if impaired  - Assess patient's need for assistive devices and provide as appropriate  - Encourage maximum independence but intervene and supervise when necessary  - Involve family in performance of ADLs  - Assess for home care needs following discharge   - Consider OT consult to assist with ADL evaluation and planning for discharge  - Provide patient education as appropriate  Outcome: Progressing  Goal: Maintains/Returns to pre admission functional level  Description: INTERVENTIONS:  - Perform BMAT or MOVE assessment daily    - Set and communicate daily mobility goal to care team and patient/family/caregiver  - Collaborate with rehabilitation services on mobility goals if consulted  - Perform Range of Motion 4 times a day  - Reposition patient every 2 hours    - Dangle patient 2 times a day  - Stand patient 2 times a day  - Ambulate patient 2 times a day  - Out of bed to chair 3 times a day   - Out of bed for meals 3 times a day  - Out of bed for toileting  - Record patient progress and toleration of activity level   Outcome: Progressing  Goal: Patient will remain free of falls  Description: INTERVENTIONS:  - Educate patient/family on patient safety including physical limitations  - Instruct patient to call for assistance with activity   - Consult OT/PT to assist with strengthening/mobility   - Keep Call bell within reach  - Keep bed low and locked with side rails adjusted as appropriate  - Keep care items and personal belongings within reach  - Initiate and maintain comfort rounds  - Make Fall Risk Sign visible to staff  - Offer Toileting every 2 Hours, in advance of need  - Initiate/Maintain bed alarm  - Obtain necessary fall risk management equipment:   - Apply yellow socks and bracelet for high fall risk patients  - Consider moving patient to room near nurses station  Outcome: Progressing     Problem: DISCHARGE PLANNING  Goal: Discharge to home or other facility with appropriate resources  Description: INTERVENTIONS:  - Identify barriers to discharge w/patient and caregiver  - Arrange for needed discharge resources and transportation as appropriate  - Identify discharge learning needs (meds, wound care, etc )  - Refer to Case Management Department for coordinating discharge planning if the patient needs post-hospital services based on physician/advanced practitioner order or complex needs related to functional status, cognitive ability, or social support system  Outcome: Progressing

## 2021-09-06 NOTE — DISCHARGE INSTR - OTHER ORDERS
Skin and Wound Care Plan:   1  Apply skin nourishing cream to the skin daily  2  Elevate heels off of bed with pillows to offload  3  P-500 low air loss therapy surface  4  Turn and reposition patient Q2 hours, avoid placing patient on left side, use green wedges to offload left buttocks  5  Allevyn life heel foams to bilateral heels, sally with P, date, and peel back daily for skin assessment, change every 3 days or with soilage or dislodgement  6 Cleanse wounds to right upper back and right lower back with NSS, cover with Allevyn life silicone bordered dressing, sally with T, date, change every 3 days or with soilage or dislodgement  7   Apply 3M No Sting barrier film to right knee scabbed areas

## 2021-09-06 NOTE — ASSESSMENT & PLAN NOTE
53-year-old male history of CP residing with a caretaker presenting for sepsis secondary to large gluteal abscess on the left side now hospital day 2  Unable to provide history, denies pain  8 x 8 cm area of erythematous ischemic changes with central 4 x 4 cm area of necrosis with purulent drainage over the left buttock  T 98 4 (Tmax 101), , RR 18 /58, SpO2 96% RA  WBC 16 9 (16 8), Hgb 9 9 (10 8), Plt 212 (229)    CT pelvis 9/5  Phlegmonous change/early abscess overlying the left gluteus bree muscle measuring 3 4 x 8 4 cm in cross-section and 9 7 cm in craniocaudal dimension    Assessment:  Large left buttock abscess with full-thickness skin necrosis secondary to suspected infected decubitus ulcer over left ischium  Plan:  Recommendation made for bedside incision and drainage as well as debridement of necrotic tissue  Details of the procedure and associated risks with anesthesia, bleeding, infection, additional procedures, prolonged wound healing were explained to the patient as well as his Via Rin Mckenzie over the phone who provided verbal consent  Case reviewed with Dr Ary ortega and procedure completed by myself at the bedside without complications  Additional wound culture obtained  Continue antibiotics per primary team, Vanco Day#2  Trend vitals and monitor labs  Wound care with wet-to-dry dressings changed b i d   Might consider serial debridements, eventual wound VAC

## 2021-09-06 NOTE — QUICK NOTE
Received a return phone call from patient's adoptive sister Alexy Levy at [phone]    She verified patient's Depakote dose with me and in talking to her patient's caregiver as listed on the chart as a [facility] Staff she said that he is no longer his caregiver but she was unable to give me information as to new caregiver's name or contact information  She stated that she is the power of  and is able to give consent for surgery if it were needed

## 2021-09-07 ENCOUNTER — APPOINTMENT (INPATIENT)
Dept: NON INVASIVE DIAGNOSTICS | Facility: HOSPITAL | Age: 56
DRG: 853 | End: 2021-09-07
Payer: MEDICARE

## 2021-09-07 PROBLEM — G93.41 METABOLIC ENCEPHALOPATHY: Status: ACTIVE | Noted: 2019-09-24

## 2021-09-07 PROBLEM — R78.81 BACTEREMIA DUE TO GRAM-POSITIVE BACTERIA: Status: ACTIVE | Noted: 2021-09-07

## 2021-09-07 LAB
ANION GAP SERPL CALCULATED.3IONS-SCNC: 6 MMOL/L (ref 4–13)
BUN SERPL-MCNC: 10 MG/DL (ref 7–25)
CALCIUM SERPL-MCNC: 8.4 MG/DL (ref 8.6–10.5)
CHLORIDE SERPL-SCNC: 100 MMOL/L (ref 98–107)
CO2 SERPL-SCNC: 31 MMOL/L (ref 21–31)
CREAT SERPL-MCNC: 0.5 MG/DL (ref 0.7–1.3)
ERYTHROCYTE [DISTWIDTH] IN BLOOD BY AUTOMATED COUNT: 13.6 % (ref 11.5–14.5)
GFR SERPL CREATININE-BSD FRML MDRD: 123 ML/MIN/1.73SQ M
GLUCOSE SERPL-MCNC: 102 MG/DL (ref 65–99)
GLUCOSE SERPL-MCNC: 105 MG/DL (ref 65–140)
GLUCOSE SERPL-MCNC: 113 MG/DL (ref 65–140)
GLUCOSE SERPL-MCNC: 122 MG/DL (ref 65–140)
GLUCOSE SERPL-MCNC: 141 MG/DL (ref 65–140)
HCT VFR BLD AUTO: 29.3 % (ref 42–47)
HGB BLD-MCNC: 9.9 G/DL (ref 14–18)
MAGNESIUM SERPL-MCNC: 1.7 MG/DL (ref 1.9–2.7)
MCH RBC QN AUTO: 32.5 PG (ref 26–34)
MCHC RBC AUTO-ENTMCNC: 33.8 G/DL (ref 31–37)
MCV RBC AUTO: 96 FL (ref 81–99)
PLATELET # BLD AUTO: 238 THOUSANDS/UL (ref 149–390)
PMV BLD AUTO: 9.1 FL (ref 8.6–11.7)
POTASSIUM SERPL-SCNC: 3.9 MMOL/L (ref 3.5–5.5)
PROCALCITONIN SERPL-MCNC: 0.28 NG/ML
RBC # BLD AUTO: 3.05 MILLION/UL (ref 4.3–5.9)
SODIUM SERPL-SCNC: 137 MMOL/L (ref 134–143)
WBC # BLD AUTO: 10.7 THOUSAND/UL (ref 4.8–10.8)

## 2021-09-07 PROCEDURE — 82948 REAGENT STRIP/BLOOD GLUCOSE: CPT

## 2021-09-07 PROCEDURE — 93306 TTE W/DOPPLER COMPLETE: CPT

## 2021-09-07 PROCEDURE — 83735 ASSAY OF MAGNESIUM: CPT | Performed by: INTERNAL MEDICINE

## 2021-09-07 PROCEDURE — 84145 PROCALCITONIN (PCT): CPT | Performed by: PHYSICIAN ASSISTANT

## 2021-09-07 PROCEDURE — 80048 BASIC METABOLIC PNL TOTAL CA: CPT | Performed by: INTERNAL MEDICINE

## 2021-09-07 PROCEDURE — 99024 POSTOP FOLLOW-UP VISIT: CPT | Performed by: SURGERY

## 2021-09-07 PROCEDURE — 85027 COMPLETE CBC AUTOMATED: CPT | Performed by: INTERNAL MEDICINE

## 2021-09-07 PROCEDURE — 93306 TTE W/DOPPLER COMPLETE: CPT | Performed by: INTERNAL MEDICINE

## 2021-09-07 PROCEDURE — 99232 SBSQ HOSP IP/OBS MODERATE 35: CPT | Performed by: INTERNAL MEDICINE

## 2021-09-07 RX ORDER — MAGNESIUM SULFATE HEPTAHYDRATE 40 MG/ML
2 INJECTION, SOLUTION INTRAVENOUS ONCE
Status: COMPLETED | OUTPATIENT
Start: 2021-09-07 | End: 2021-09-08

## 2021-09-07 RX ORDER — HEPARIN SODIUM 5000 [USP'U]/ML
5000 INJECTION, SOLUTION INTRAVENOUS; SUBCUTANEOUS EVERY 8 HOURS SCHEDULED
Status: DISCONTINUED | OUTPATIENT
Start: 2021-09-07 | End: 2021-09-15 | Stop reason: HOSPADM

## 2021-09-07 RX ORDER — LISINOPRIL 10 MG/1
TABLET ORAL
Qty: 28 TABLET | Refills: 4 | Status: SHIPPED | OUTPATIENT
Start: 2021-09-07 | End: 2022-02-09 | Stop reason: SDUPTHER

## 2021-09-07 RX ADMIN — BENZTROPINE MESYLATE 0.5 MG: 0.5 TABLET ORAL at 08:15

## 2021-09-07 RX ADMIN — DIVALPROEX SODIUM 500 MG: 500 TABLET, EXTENDED RELEASE ORAL at 08:15

## 2021-09-07 RX ADMIN — HYDROXYZINE HYDROCHLORIDE 50 MG: 25 TABLET ORAL at 16:21

## 2021-09-07 RX ADMIN — TEMAZEPAM 15 MG: 15 CAPSULE ORAL at 21:45

## 2021-09-07 RX ADMIN — CALCIUM 1 TABLET: 500 TABLET ORAL at 08:14

## 2021-09-07 RX ADMIN — VANCOMYCIN HYDROCHLORIDE 1250 MG: 500 INJECTION, POWDER, LYOPHILIZED, FOR SOLUTION INTRAVENOUS at 18:01

## 2021-09-07 RX ADMIN — AZTREONAM 2000 MG: 2 INJECTION, POWDER, LYOPHILIZED, FOR SOLUTION INTRAMUSCULAR; INTRAVENOUS at 13:59

## 2021-09-07 RX ADMIN — BENZTROPINE MESYLATE 0.5 MG: 0.5 TABLET ORAL at 17:27

## 2021-09-07 RX ADMIN — HYDROXYZINE HYDROCHLORIDE 50 MG: 25 TABLET ORAL at 21:46

## 2021-09-07 RX ADMIN — DIVALPROEX SODIUM 1000 MG: 500 TABLET, EXTENDED RELEASE ORAL at 21:45

## 2021-09-07 RX ADMIN — HEPARIN SODIUM 5000 UNITS: 5000 INJECTION INTRAVENOUS; SUBCUTANEOUS at 16:22

## 2021-09-07 RX ADMIN — GABAPENTIN 100 MG: 100 CAPSULE ORAL at 08:15

## 2021-09-07 RX ADMIN — LORATADINE 10 MG: 10 TABLET ORAL at 08:15

## 2021-09-07 RX ADMIN — LACOSAMIDE 200 MG: 50 TABLET, FILM COATED ORAL at 08:14

## 2021-09-07 RX ADMIN — Medication 1000 UNITS: at 08:14

## 2021-09-07 RX ADMIN — DIVALPROEX SODIUM 1000 MG: 500 TABLET, EXTENDED RELEASE ORAL at 00:49

## 2021-09-07 RX ADMIN — MAGNESIUM SULFATE IN WATER 2 G: 40 INJECTION, SOLUTION INTRAVENOUS at 11:58

## 2021-09-07 RX ADMIN — POLYETHYLENE GLYCOL 3350 17 G: 17 POWDER, FOR SOLUTION ORAL at 08:15

## 2021-09-07 RX ADMIN — AZTREONAM 2000 MG: 2 INJECTION, POWDER, LYOPHILIZED, FOR SOLUTION INTRAMUSCULAR; INTRAVENOUS at 21:40

## 2021-09-07 RX ADMIN — VANCOMYCIN HYDROCHLORIDE 1250 MG: 500 INJECTION, POWDER, LYOPHILIZED, FOR SOLUTION INTRAVENOUS at 02:11

## 2021-09-07 RX ADMIN — LACOSAMIDE 200 MG: 50 TABLET, FILM COATED ORAL at 21:46

## 2021-09-07 RX ADMIN — PRAVASTATIN SODIUM 80 MG: 40 TABLET ORAL at 16:21

## 2021-09-07 RX ADMIN — LEVETIRACETAM 1500 MG: 500 TABLET, FILM COATED ORAL at 17:27

## 2021-09-07 RX ADMIN — CYANOCOBALAMIN TAB 500 MCG 1000 MCG: 500 TAB at 08:15

## 2021-09-07 RX ADMIN — LEVOTHYROXINE SODIUM 125 MCG: 25 TABLET ORAL at 05:23

## 2021-09-07 RX ADMIN — LISINOPRIL 10 MG: 10 TABLET ORAL at 08:15

## 2021-09-07 RX ADMIN — LEVETIRACETAM 1500 MG: 500 TABLET, FILM COATED ORAL at 08:15

## 2021-09-07 RX ADMIN — GABAPENTIN 100 MG: 100 CAPSULE ORAL at 16:21

## 2021-09-07 RX ADMIN — CALCIUM 1 TABLET: 500 TABLET ORAL at 16:21

## 2021-09-07 RX ADMIN — QUETIAPINE FUMARATE 400 MG: 50 TABLET, EXTENDED RELEASE ORAL at 21:45

## 2021-09-07 RX ADMIN — CALCIUM 1 TABLET: 500 TABLET ORAL at 11:58

## 2021-09-07 RX ADMIN — HYDROXYZINE HYDROCHLORIDE 50 MG: 25 TABLET ORAL at 08:14

## 2021-09-07 RX ADMIN — AZTREONAM 2000 MG: 2 INJECTION, POWDER, LYOPHILIZED, FOR SOLUTION INTRAMUSCULAR; INTRAVENOUS at 05:23

## 2021-09-07 RX ADMIN — VANCOMYCIN HYDROCHLORIDE 1250 MG: 500 INJECTION, POWDER, LYOPHILIZED, FOR SOLUTION INTRAVENOUS at 09:32

## 2021-09-07 RX ADMIN — HEPARIN SODIUM 5000 UNITS: 5000 INJECTION INTRAVENOUS; SUBCUTANEOUS at 21:43

## 2021-09-07 RX ADMIN — QUETIAPINE FUMARATE 200 MG: 50 TABLET, EXTENDED RELEASE ORAL at 13:59

## 2021-09-07 RX ADMIN — GABAPENTIN 100 MG: 100 CAPSULE ORAL at 21:46

## 2021-09-07 NOTE — CONSULTS
Vancomycin IV Pharmacy-To-Dose Consultation:    Bel Newberry is a 49 y o  male who is currently receiving Vancomycin IV with management by the Pharmacy Consult service for the treatment of skin-soft tissue infection  Assessment/Plan:    The patient's chart was reviewed  Renal function is stable  There are no signs or symptoms of nephrotoxicity and/or infusion reactions documented  Based on today's assessment, will continue current vancomycin (Day # 3) dosing of 1250 mg IV Q 8 Hrs, with a plan for trough to be drawn at 0930 on 9/8/21  We will continue to follow the patient's culture results and clinical progress daily      Caitlin Subramanian, Pharmacist

## 2021-09-07 NOTE — ASSESSMENT & PLAN NOTE
· Blood cultures 1 of 2 Gram-positive cocci in clusters  · Continue antibiotics  · Follow-up sensitivity

## 2021-09-07 NOTE — PROGRESS NOTES
[facility]  Progress Note Ray Dunlap 1965, 49 y o  male MRN: [id number]  Unit/Bed#: -01 Encounter: [id number]  Primary Care Provider: Abhilash Lr PA-C   Date and time admitted to hospital: 9/5/2021  5:40 PM    * Sepsis [facility])  Assessment & Plan  · Secondary to left gluteal abscess, febrile and tachycardic on admission  · 1/2 sets of blood cultures positive for Staph aureus  · Patient status post bedside debridement on 09/06/2021 by surgical team  · Will continue IV antibiotics with vancomycin/aztreonam (patient with unclear penicillin allergy)  · IV fluids as needed  · Will trend procalcitonin  · Follow-up final cultures  · Repeat blood cultures  · Check echo given bacteremia  · Surgical team following    Bacteremia due to Gram-positive bacteria  Assessment & Plan  · Blood cultures 1 of 2 Gram-positive cocci in clusters  · Continue antibiotics  · Follow-up sensitivity    Gluteal abscess  Assessment & Plan  · Surgical team following  Will continue treatment as above    Hyponatremia  Assessment & Plan  · Likely secondary to hypovolemia, improved with IV fluids    Metabolic encephalopathy  Assessment & Plan  · Likely secondary to sepsis/bacteremia  · Mental status appears to be back at baseline  · Will continue supportive care    Generalized convulsive epilepsy [facility])  Assessment & Plan  Continue pre-hospital Keppra 1500 mg p o  B i d , Depakote  mg p o  Daily and 1000 mg p o  Q h s , Klonopin 0 25 mg p o  B i d  and Vimpat 200 mg p o  B i d  Type 2 diabetes mellitus without complication, without long-term current use of insulin ([facility])  Assessment & Plan  · Diabetic diet  · Insulin sliding scale      VTE Prophylaxis:  Will discuss with surgical team regarding initiating DVT prophylaxis    Patient Centered Rounds: I have performed bedside rounds with nursing staff today      Discussions with Specialists or Other Care Team Provider: yes  Education and Discussions with Family / Patient:  Spoke with family at bedside regarding plan of care    Current Length of Stay: 2 day(s)    Current Patient Status: Inpatient   Certification Statement: The patient will continue to require additional inpatient hospital stay due to Sepsis    Discharge Plan:  Pending hospital course    Code Status: Level 1 - Full Code    Subjective:   No overnight events noted  Patient blood cultures found to be positive for Staph aureus  Unable to get any review of systems from patient  Patient tolerating diet per nursing aide    Objective:     Vitals:   Temp (24hrs), Av 2 °F (36 8 °C), Min:97 3 °F (36 3 °C), Max:98 8 °F (37 1 °C)    Temp:  [97 3 °F (36 3 °C)-98 8 °F (37 1 °C)] 97 3 °F (36 3 °C)  HR:  [62-95] 62  Resp:  [18] 18  BP: (114-117)/(56-60) 117/58  SpO2:  [94 %-96 %] 94 %  Body mass index is 22 24 kg/m²  Input and Output Summary (last 24 hours): Intake/Output Summary (Last 24 hours) at 2021 1440  Last data filed at 2021 1401  Gross per 24 hour   Intake 480 ml   Output 1300 ml   Net -820 ml       Physical Exam:   Physical Exam  Constitutional:       General: He is not in acute distress  Comments: Frail  male   HENT:      Head: Normocephalic and atraumatic  Nose: Nose normal       Mouth/Throat:      Mouth: Mucous membranes are moist    Eyes:      Extraocular Movements: Extraocular movements intact  Conjunctiva/sclera: Conjunctivae normal    Cardiovascular:      Rate and Rhythm: Normal rate and regular rhythm  Pulmonary:      Effort: Pulmonary effort is normal  No respiratory distress  Abdominal:      Palpations: Abdomen is soft  Tenderness: There is no abdominal tenderness  Musculoskeletal:         General: Normal range of motion  Cervical back: Normal range of motion and neck supple  Skin:     General: Skin is warm and dry  Neurological:      General: No focal deficit present        Mental Status: Mental status is at baseline  Cranial Nerves: No cranial nerve deficit  Comments: Baseline cognitive impairment   Psychiatric:         Mood and Affect: Mood normal          Behavior: Behavior normal          Additional Data:     Labs:    Results from last 7 days   Lab Units 09/07/21  0537 09/06/21  0443   WBC Thousand/uL 10 70 16 90*   HEMOGLOBIN g/dL 9 9* 9 9*   HEMATOCRIT % 29 3* 29 2*   PLATELETS Thousands/uL 238 212   LYMPHO PCT %  --  16*   MONO PCT %  --  16*     Results from last 7 days   Lab Units 09/07/21  0537 09/05/21  1906   SODIUM mmol/L 137 132*   POTASSIUM mmol/L 3 9 3 9   CHLORIDE mmol/L 100 92*   CO2 mmol/L 31 27   BUN mg/dL 10 13   CREATININE mg/dL 0 50* 0 67*   CALCIUM mg/dL 8 4* 8 5*   ALK PHOS U/L  --  50   ALT U/L  --  30   AST U/L  --  36         Results from last 7 days   Lab Units 09/07/21  1123 09/07/21  0608 09/06/21  2105 09/06/21  1638 09/06/21  1122 09/06/21  0603 09/06/21  0007   POC GLUCOSE mg/dl 141* 113 119 114 130 99 124           * I Have Reviewed All Lab Data Listed Above  * Additional Pertinent Lab Tests Reviewed: Daisy 66 Admission  Reviewed    Imaging:  Imaging Reports Reviewed Today Include:  No new imaging    Recent Cultures (last 7 days):     Results from last 7 days   Lab Units 09/06/21  0927 09/05/21  2015 09/05/21  1906   BLOOD CULTURE   --   --  No Growth at 24 hrs    Staphylococcus aureus*   GRAM STAIN RESULT  2+ Polys*  3+ Gram positive cocci in pairs, chains and clusters* 3+ Gram positive cocci in pairs, chains and clusters*  No polys seen* Gram positive cocci in clusters*   WOUND CULTURE  3+ Growth of Staphylococcus aureus* 4+ Growth of Staphylococcus aureus*  1+ Growth of Escherichia coli*  --        Last 24 Hours Medication List:   Current Facility-Administered Medications   Medication Dose Route Frequency Provider Last Rate    aztreonam  2,000 mg Intravenous Q8H Spencer Glover MD 2,000 mg (09/07/21 1359)    benzonatate  200 mg Oral TID PRN [facility], [facility]      benztropine  0 5 mg Oral BID [facility], PA-C      calcium carbonate  1 tablet Oral TID [facility] ST[facility] Reynoso-[facility]      cholecalciferol  1,000 Units Oral Daily [facility], PA-C      clonazePAM  0 25 mg Oral BID PRN [facility], PA-C      vitamin B-12  1,000 mcg Oral Daily [facility], PA-C      divalproex sodium  1,000 mg Oral HS [facility], PA-C      divalproex sodium  500 mg Oral Daily [facility], PA-C      gabapentin  100 mg Oral TID [facility], PA-C      hydrOXYzine HCL  50 mg Oral TID [facility], PA-C      insulin lispro  1-5 Units Subcutaneous HS Ann-Marie Ortega, PA-DENIA      insulin lispro  2-12 Units Subcutaneous TID [facility] TylerJAIR fink      lacosamide  200 mg Oral Q12H [address] [facility], PA-C      levETIRAcetam  1,500 mg Oral BID [facility], PA-C      levothyroxine  125 mcg Oral Early Morning [facility], PA-C      lisinopril  10 mg Oral Daily [facility], PA-C      loratadine  10 mg Oral Daily [facility], PA-C      polyethylene glycol  17 g Oral Daily [facility], PA-C      pravastatin  80 mg Oral Daily With Yahaira Drmeenakshi, PA-C      QUEtiapine  200 mg Oral BID (AM & Afternoon) [facility], PA-C      QUEtiapine  400 mg Oral HS [facility] Angeles, PA-C      temazepam  15 mg Oral HS [facility], PA-C      vancomycin  1,250 mg Intravenous Q8H [facility], PA-C 1,250 mg (09/07/21 0932)        Today, Patient Was Seen By: Ginny Meyer MD    ** Please Note: Dictation voice to text software may have been used in the creation of this document   **

## 2021-09-07 NOTE — ASSESSMENT & PLAN NOTE
· Likely secondary to sepsis/bacteremia  · Mental status appears to be back at baseline  · Will continue supportive care

## 2021-09-07 NOTE — PROGRESS NOTES
Progress Note - General Surgery   Rito Yang 49 y o  male MRN: [id number]  Unit/Bed#: -01 Encounter: [id number]    Assessment/Plan     56M with cerebral palsy, now admitted with staph aureus bacteremia secondary to a large left gluteal abscess  Status post bedside incision and drainage yesterday, WBC down from 16 to 10  Culture sensitivities pending, continues on vancomycin and aztreonam  Dressing changed at bedside, cavity probed and cleansed aggressively, no significant undrained purulence  Surrounding skin appears ischemic and necrotic in certain areas but has not fully declared itself  Would continue aggressive wound care BID and as needed as per the orders and will continue to monitor the wound and surrounding cellulitis  May benefit from wound VAC after the wound is a bit   Will continue to follow  Subjective/Objective      Subjective: minimal pain in the left buttock, clinically stable, afebrile, WBC improving, cultures s aureus in blood, sensitivities pending  Objective:     Blood pressure 117/58, pulse 62, temperature (!) 97 3 °F (36 3 °C), temperature source Temporal, resp  rate 18, height 6' (1 829 m), weight 74 4 kg (164 lb), SpO2 94 %  ,Body mass index is 22 24 kg/m²  Intake/Output Summary (Last 24 hours) at 9/7/2021 1242  Last data filed at 9/7/2021 5492  Gross per 24 hour   Intake --   Output 900 ml   Net -900 ml       Invasive Devices     Peripheral Intravenous Line            Peripheral IV 09/05/21 Right Arm 1 day                Physical Exam  Vitals reviewed  Constitutional:       General: He is not in acute distress  Appearance: He is not toxic-appearing     Skin:     Comments: Left buttock incision and drainage site - 5 x 3 x 2cm wound with circumferential tunneling and undermining probed and irrigated aggressively, some minimal residual purulence cleansed but no new pockets encountered, surrounding skin edges with some ongoing ischemic and cellulitic change but much improved in comparison to the picture from yesterday's exam both pre and post drainage, dressing replaced   Neurological:      Mental Status: He is alert  Lab, Imaging and other studies:  I have personally reviewed pertinent lab results    , CBC:   Lab Results   Component Value Date    WBC 10 70 09/07/2021    HGB 9 9 (L) 09/07/2021    HCT 29 3 (L) 09/07/2021    MCV 96 09/07/2021     09/07/2021    MCH 32 5 09/07/2021    MCHC 33 8 09/07/2021    RDW 13 6 09/07/2021    MPV 9 1 09/07/2021   , CMP:   Lab Results   Component Value Date    SODIUM 137 09/07/2021    K 3 9 09/07/2021     09/07/2021    CO2 31 09/07/2021    BUN 10 09/07/2021    CREATININE 0 50 (L) 09/07/2021    CALCIUM 8 4 (L) 09/07/2021    EGFR 123 09/07/2021

## 2021-09-07 NOTE — ASSESSMENT & PLAN NOTE
· Secondary to left gluteal abscess, febrile and tachycardic on admission  · 1/2 sets of blood cultures positive for Staph aureus  · Patient status post bedside debridement on 09/06/2021 by surgical team  · Will continue IV antibiotics with vancomycin/aztreonam (patient with unclear penicillin allergy)  · IV fluids as needed  · Will trend procalcitonin  · Follow-up final cultures  · Repeat blood cultures  · Check echo given bacteremia  · Surgical team following

## 2021-09-07 NOTE — PROGRESS NOTES
Vancomycin Assessment    Christiane Naylor is a 49 y o  male who is currently receiving vancomycin 1000mg iv q8h for skin-soft tissue infection     Relevant clinical data and objective history reviewed:  Creatinine   Date Value Ref Range Status   09/06/2021 0 50 (L) 0 70 - 1 30 mg/dL Final     Comment:     Standardized to IDMS reference method   09/05/2021 0 67 (L) 0 70 - 1 30 mg/dL Final     Comment:     Standardized to IDMS reference method   05/18/2021 0 63 (L) 0 70 - 1 30 mg/dL Final     Comment:     Standardized to IDMS reference method     /60 (BP Location: Right arm)   Pulse 94   Temp 98 4 °F (36 9 °C) (Temporal)   Resp 18   Ht 6' (1 829 m)   Wt 74 4 kg (164 lb)   SpO2 96%   BMI 22 24 kg/m²   I/O last 3 completed shifts: In: 3186 7 [I V :980; IV Piggyback:2206 7]  Out: -   Lab Results   Component Value Date/Time    BUN 9 09/06/2021 04:43 AM    WBC 16 90 (H) 09/06/2021 04:43 AM    HGB 9 9 (L) 09/06/2021 04:43 AM    HCT 29 2 (L) 09/06/2021 04:43 AM    MCV 95 09/06/2021 04:43 AM     09/06/2021 04:43 AM     Temp Readings from Last 3 Encounters:   09/06/21 98 4 °F (36 9 °C) (Temporal)   05/18/21 97 9 °F (36 6 °C) (Tympanic)   05/18/21 (!) 96 9 °F (36 1 °C)     Vancomycin Days of Therapy: 2    Assessment/Plan  The patient is currently on vancomycin utilizing scheduled dosing  Baseline risks associated with therapy include:  none   The patient is receiving 1000mg iv q8h with the most recent vancomycin level being at steady-state and sub-therapeutic based on a goal of 15-20 (appropriate for most indications) ; therefore, after clinical evaluation will be changed to 1250mg iv q8h   Pharmacy will continue to follow closely for s/sx of nephrotoxicity, infusion reactions, and appropriateness of therapy  BMP and CBC will be ordered per protocol  Plan for trough as patient approaches steady state, prior to the 5th  dose at approximately 0930 on 9/8/21   Pharmacy will continue to follow the patients culture results and clinical progress daily      Dedrick Gonzalez, Pharmacist

## 2021-09-08 LAB
ANION GAP SERPL CALCULATED.3IONS-SCNC: 7 MMOL/L (ref 4–13)
BACTERIA BLD CULT: ABNORMAL
BACTERIA WND AEROBE CULT: ABNORMAL
BUN SERPL-MCNC: 10 MG/DL (ref 7–25)
CALCIUM SERPL-MCNC: 8.5 MG/DL (ref 8.6–10.5)
CHLORIDE SERPL-SCNC: 102 MMOL/L (ref 98–107)
CO2 SERPL-SCNC: 28 MMOL/L (ref 21–31)
CREAT SERPL-MCNC: 0.44 MG/DL (ref 0.7–1.3)
ERYTHROCYTE [DISTWIDTH] IN BLOOD BY AUTOMATED COUNT: 13.5 % (ref 11.5–14.5)
GFR SERPL CREATININE-BSD FRML MDRD: 129 ML/MIN/1.73SQ M
GLUCOSE SERPL-MCNC: 102 MG/DL (ref 65–99)
GLUCOSE SERPL-MCNC: 131 MG/DL (ref 65–140)
GLUCOSE SERPL-MCNC: 170 MG/DL (ref 65–140)
GLUCOSE SERPL-MCNC: 187 MG/DL (ref 65–140)
GLUCOSE SERPL-MCNC: 93 MG/DL (ref 65–140)
GRAM STN SPEC: ABNORMAL
HCT VFR BLD AUTO: 29.4 % (ref 42–47)
HGB BLD-MCNC: 10 G/DL (ref 14–18)
MCH RBC QN AUTO: 32.5 PG (ref 26–34)
MCHC RBC AUTO-ENTMCNC: 33.9 G/DL (ref 31–37)
MCV RBC AUTO: 96 FL (ref 81–99)
PLATELET # BLD AUTO: 264 THOUSANDS/UL (ref 149–390)
PMV BLD AUTO: 9 FL (ref 8.6–11.7)
POTASSIUM SERPL-SCNC: 3.9 MMOL/L (ref 3.5–5.5)
PROCALCITONIN SERPL-MCNC: 0.17 NG/ML
RBC # BLD AUTO: 3.07 MILLION/UL (ref 4.3–5.9)
SODIUM SERPL-SCNC: 137 MMOL/L (ref 134–143)
VANCOMYCIN TROUGH SERPL-MCNC: 11.9 UG/ML (ref 5–12)
WBC # BLD AUTO: 9.6 THOUSAND/UL (ref 4.8–10.8)

## 2021-09-08 PROCEDURE — 80202 ASSAY OF VANCOMYCIN: CPT | Performed by: PHYSICIAN ASSISTANT

## 2021-09-08 PROCEDURE — 87040 BLOOD CULTURE FOR BACTERIA: CPT | Performed by: INTERNAL MEDICINE

## 2021-09-08 PROCEDURE — NC001 PR NO CHARGE: Performed by: PHYSICIAN ASSISTANT

## 2021-09-08 PROCEDURE — 0JB90ZZ EXCISION OF BUTTOCK SUBCUTANEOUS TISSUE AND FASCIA, OPEN APPROACH: ICD-10-PCS | Performed by: SURGERY

## 2021-09-08 PROCEDURE — 85027 COMPLETE CBC AUTOMATED: CPT | Performed by: INTERNAL MEDICINE

## 2021-09-08 PROCEDURE — 82948 REAGENT STRIP/BLOOD GLUCOSE: CPT

## 2021-09-08 PROCEDURE — 99232 SBSQ HOSP IP/OBS MODERATE 35: CPT | Performed by: INTERNAL MEDICINE

## 2021-09-08 PROCEDURE — 99024 POSTOP FOLLOW-UP VISIT: CPT | Performed by: PHYSICIAN ASSISTANT

## 2021-09-08 PROCEDURE — 80048 BASIC METABOLIC PNL TOTAL CA: CPT | Performed by: INTERNAL MEDICINE

## 2021-09-08 PROCEDURE — 11042 DBRDMT SUBQ TIS 1ST 20SQCM/<: CPT | Performed by: SURGERY

## 2021-09-08 PROCEDURE — 84145 PROCALCITONIN (PCT): CPT | Performed by: INTERNAL MEDICINE

## 2021-09-08 PROCEDURE — G0427 INPT/ED TELECONSULT70: HCPCS | Performed by: INTERNAL MEDICINE

## 2021-09-08 RX ADMIN — HYDROXYZINE HYDROCHLORIDE 50 MG: 25 TABLET ORAL at 15:44

## 2021-09-08 RX ADMIN — LEVETIRACETAM 1500 MG: 500 TABLET, FILM COATED ORAL at 09:00

## 2021-09-08 RX ADMIN — LACOSAMIDE 200 MG: 150 TABLET, FILM COATED ORAL at 22:56

## 2021-09-08 RX ADMIN — AZTREONAM 2000 MG: 2 INJECTION, POWDER, LYOPHILIZED, FOR SOLUTION INTRAMUSCULAR; INTRAVENOUS at 05:53

## 2021-09-08 RX ADMIN — TEMAZEPAM 15 MG: 15 CAPSULE ORAL at 22:57

## 2021-09-08 RX ADMIN — CALCIUM 1 TABLET: 500 TABLET ORAL at 11:56

## 2021-09-08 RX ADMIN — HYDROXYZINE HYDROCHLORIDE 50 MG: 25 TABLET ORAL at 09:00

## 2021-09-08 RX ADMIN — GABAPENTIN 100 MG: 100 CAPSULE ORAL at 15:44

## 2021-09-08 RX ADMIN — DIVALPROEX SODIUM 1000 MG: 500 TABLET, EXTENDED RELEASE ORAL at 22:57

## 2021-09-08 RX ADMIN — PRAVASTATIN SODIUM 80 MG: 40 TABLET ORAL at 15:44

## 2021-09-08 RX ADMIN — CALCIUM 1 TABLET: 500 TABLET ORAL at 15:44

## 2021-09-08 RX ADMIN — HEPARIN SODIUM 5000 UNITS: 5000 INJECTION INTRAVENOUS; SUBCUTANEOUS at 14:03

## 2021-09-08 RX ADMIN — DIVALPROEX SODIUM 500 MG: 500 TABLET, EXTENDED RELEASE ORAL at 09:00

## 2021-09-08 RX ADMIN — Medication 2000 MG: at 15:44

## 2021-09-08 RX ADMIN — LISINOPRIL 10 MG: 10 TABLET ORAL at 09:00

## 2021-09-08 RX ADMIN — INSULIN LISPRO 2 UNITS: 100 INJECTION, SOLUTION INTRAVENOUS; SUBCUTANEOUS at 11:56

## 2021-09-08 RX ADMIN — Medication 1000 UNITS: at 09:01

## 2021-09-08 RX ADMIN — BENZTROPINE MESYLATE 0.5 MG: 0.5 TABLET ORAL at 17:22

## 2021-09-08 RX ADMIN — QUETIAPINE FUMARATE 200 MG: 50 TABLET, EXTENDED RELEASE ORAL at 14:02

## 2021-09-08 RX ADMIN — INSULIN LISPRO 2 UNITS: 100 INJECTION, SOLUTION INTRAVENOUS; SUBCUTANEOUS at 16:33

## 2021-09-08 RX ADMIN — CALCIUM 1 TABLET: 500 TABLET ORAL at 09:01

## 2021-09-08 RX ADMIN — VANCOMYCIN HYDROCHLORIDE 1750 MG: 500 INJECTION, POWDER, LYOPHILIZED, FOR SOLUTION INTRAVENOUS at 17:22

## 2021-09-08 RX ADMIN — LEVETIRACETAM 1500 MG: 500 TABLET, FILM COATED ORAL at 17:22

## 2021-09-08 RX ADMIN — HYDROXYZINE HYDROCHLORIDE 50 MG: 25 TABLET ORAL at 22:56

## 2021-09-08 RX ADMIN — HEPARIN SODIUM 5000 UNITS: 5000 INJECTION INTRAVENOUS; SUBCUTANEOUS at 05:53

## 2021-09-08 RX ADMIN — HEPARIN SODIUM 5000 UNITS: 5000 INJECTION INTRAVENOUS; SUBCUTANEOUS at 22:50

## 2021-09-08 RX ADMIN — VANCOMYCIN HYDROCHLORIDE 1250 MG: 500 INJECTION, POWDER, LYOPHILIZED, FOR SOLUTION INTRAVENOUS at 02:23

## 2021-09-08 RX ADMIN — QUETIAPINE FUMARATE 200 MG: 50 TABLET, EXTENDED RELEASE ORAL at 09:01

## 2021-09-08 RX ADMIN — LACOSAMIDE 150 MG: 50 TABLET, FILM COATED ORAL at 09:01

## 2021-09-08 RX ADMIN — Medication 2000 MG: at 22:53

## 2021-09-08 RX ADMIN — VANCOMYCIN HYDROCHLORIDE 1250 MG: 500 INJECTION, POWDER, LYOPHILIZED, FOR SOLUTION INTRAVENOUS at 10:27

## 2021-09-08 RX ADMIN — LEVOTHYROXINE SODIUM 125 MCG: 25 TABLET ORAL at 05:53

## 2021-09-08 RX ADMIN — CYANOCOBALAMIN TAB 500 MCG 1000 MCG: 500 TAB at 09:01

## 2021-09-08 RX ADMIN — BENZTROPINE MESYLATE 0.5 MG: 0.5 TABLET ORAL at 09:01

## 2021-09-08 RX ADMIN — QUETIAPINE FUMARATE 400 MG: 50 TABLET, EXTENDED RELEASE ORAL at 22:56

## 2021-09-08 RX ADMIN — GABAPENTIN 100 MG: 100 CAPSULE ORAL at 09:01

## 2021-09-08 RX ADMIN — GABAPENTIN 100 MG: 100 CAPSULE ORAL at 22:56

## 2021-09-08 RX ADMIN — LORATADINE 10 MG: 10 TABLET ORAL at 09:00

## 2021-09-08 NOTE — PROGRESS NOTES
[address] [facility]  Progress Note Sushma Resendiz 1965, 49 y o  male MRN: [id number]  Unit/Bed#: -01 Encounter: [id number]  Primary Care Provider: Jose David Cristina PA-C   Date and time admitted to hospital: 9/5/2021  5:40 PM    Gluteal abscess  Assessment & Plan  53-year-old male history of CP POD#2 s/p I&D/debridement of large abscessed L ischium decubitus ulcer and sepsis with MRSA bacteremia  Denies pain  Wound soft with receeding erythema, purulence expressed from medial aspect, partial/full thickness necrosis noted on skin  AFVSS on room air  WBC 9 6, Hgb 10 0    Blood culture #1 from 9/5: MRSA  Wound culture 9/5: Staph aureus/E  Coli  Wound culture 9/6 (I&D): MRSA    CT pelvis 9/5  Phlegmonous change/early abscess overlying the left gluteus bree muscle measuring 3 4 x 8 4 cm in cross-section and 9 7 cm in craniocaudal dimension    Assessment:  Large abscessed decubitus ulcer of left ischium/buttock with full thickness skin necrosis, MRSA positive  Sepsis with MRSA bacteremia  Plan:  Wound was debrided sharply of necrotic tissue, irrigated, and re-dressed  Continue BID changes, monitoring purulent drainage from medial aspect and necrotic regions  Consider VAC application as purulence/necrosis clears  Continue Vanco therapy per primary team   Dr Viral Cruz updated  Subjective/Objective   Chief Complaint: nonverbal    Subjective: nonverbal    Objective: no overnight events    Blood pressure 131/63, pulse 77, temperature (!) 97 3 °F (36 3 °C), temperature source Temporal, resp  rate 18, height 6' (1 829 m), weight 74 4 kg (164 lb), SpO2 98 %  ,Body mass index is 22 24 kg/m²        Intake/Output Summary (Last 24 hours) at 9/8/2021 1008  Last data filed at 9/7/2021 1401  Gross per 24 hour   Intake 960 ml   Output 700 ml   Net 260 ml       Invasive Devices     Peripheral Intravenous Line            Peripheral IV 09/05/21 Right Arm 2 days                Physical Exam  Vitals and nursing note reviewed  Constitutional:       General: He is not in acute distress  Appearance: He is well-developed  He is not diaphoretic  HENT:      Head: Normocephalic and atraumatic  Eyes:      Conjunctiva/sclera: Conjunctivae normal       Pupils: Pupils are equal, round, and reactive to light  Pulmonary:      Effort: No respiratory distress  Musculoskeletal:         General: Normal range of motion  Cervical back: Normal range of motion  Skin:     General: Skin is warm and dry  Capillary Refill: Capillary refill takes less than 2 seconds  Comments: Inspection of L buttock reveals cellulitis to be receeding with less erythema/swelling/induration  Periwound with partial and full thickness necrosis noted  This was sharply debrided with iris scissors  Wound bed revealed healthy red tissue with mild slough  There is circumferential undermining at least 3 cm in every direction  Purulent drainage ongoing from medial most aspect  Wound was probed then irrigated copiously with wound cleanser  Re-packed and dressed  Neurological:      Mental Status: He is alert  Comments: Nonverbal, unoriented  Psychiatric:         Behavior: Behavior normal            Lab, Imaging and other studies:  I have personally reviewed pertinent lab results    , CBC:   Lab Results   Component Value Date    WBC 9 60 09/08/2021    HGB 10 0 (L) 09/08/2021    HCT 29 4 (L) 09/08/2021    MCV 96 09/08/2021     09/08/2021    MCH 32 5 09/08/2021    MCHC 33 9 09/08/2021    RDW 13 5 09/08/2021    MPV 9 0 09/08/2021   , CMP:   Lab Results   Component Value Date    SODIUM 137 09/08/2021    K 3 9 09/08/2021     09/08/2021    CO2 28 09/08/2021    BUN 10 09/08/2021    CREATININE 0 44 (L) 09/08/2021    CALCIUM 8 5 (L) 09/08/2021    EGFR 129 09/08/2021     VTE Pharmacologic Prophylaxis: Heparin  VTE Mechanical Prophylaxis: sequential compression device

## 2021-09-08 NOTE — CONSULTS
Vancomycin Assessment    Zeyad Watkins is a 49 y o  male who is currently receiving vancomycin 1250 mg IV Q 8 Hrs for skin-soft tissue infection  Relevant clinical data and objective history reviewed:  Creatinine   Date Value Ref Range Status   09/08/2021 0 44 (L) 0 70 - 1 30 mg/dL Final     Comment:     Standardized to IDMS reference method   09/07/2021 0 50 (L) 0 70 - 1 30 mg/dL Final     Comment:     Standardized to IDMS reference method   09/06/2021 0 50 (L) 0 70 - 1 30 mg/dL Final     Comment:     Standardized to IDMS reference method     /63 (BP Location: Left arm)   Pulse 77   Temp (!) 97 3 °F (36 3 °C) (Temporal)   Resp 18   Ht 6' (1 829 m)   Wt 74 4 kg (164 lb)   SpO2 98%   BMI 22 24 kg/m²   I/O last 3 completed shifts: In: 1320 [P O :1320]  Out: 2000 [Urine:2000]  Lab Results   Component Value Date/Time    BUN 10 09/08/2021 05:17 AM    WBC 9 60 09/08/2021 05:17 AM    HGB 10 0 (L) 09/08/2021 05:17 AM    HCT 29 4 (L) 09/08/2021 05:17 AM    MCV 96 09/08/2021 05:17 AM     09/08/2021 05:17 AM     Temp Readings from Last 3 Encounters:   09/08/21 (!) 97 3 °F (36 3 °C) (Temporal)   05/18/21 97 9 °F (36 6 °C) (Tympanic)   05/18/21 (!) 96 9 °F (36 1 °C)     Vancomycin Days of Therapy: 4    Assessment/Plan  The patient is currently on vancomycin utilizing scheduled dosing  The patient is receiving 1250 mg IV Q 8 Hrs with the most recent vancomycin level being at steady-state and sub-therapeutic (11 9) based on a goal of 15-20 (appropriate for most indications) ; therefore, after clinical evaluation will be changed to 1750 mg IV Q 8 Hrs   Pharmacy will continue to follow closely for s/sx of nephrotoxicity, infusion reactions, and appropriateness of therapy  BMP and CBC will be ordered per protocol  Plan for trough as patient approaches steady state, prior to the 4th  dose at approximately 1630 on 9/9/21   Pharmacy will continue to follow the patients culture results and clinical progress daily     Filomena Paz, Pharmacist

## 2021-09-08 NOTE — CASE MANAGEMENT
Case Management Assessment    Patient name Sathya Angeles  Location /-73 MRN [id number]  : 1965 Date 2021       Current Admission Date: 2021  Current Admission Diagnosis:  Sepsis [facility])   Patient Active Problem List   Diagnosis    Cataract    Cerebral palsy ([facility] )    Type 2 diabetes mellitus without complication, without long-term current use of insulin (HCC)    Generalized convulsive epilepsy ([facility] )    Hyperlipidemia    Essential hypertension    Acquired hypothyroidism    Osteoporosis    Scoliosis    Vitamin B12 deficiency    Vitamin D deficiency    Seborrheic dermatitis of scalp    Nearsightedness    Behavior concern in adult    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HCC)    Metabolic encephalopathy    Sepsis ([facility] )    Hyponatremia    Thrombocytopathia (HCC)    Gluteal abscess    Bacteremia due to Gram-positive bacteria    Previous Admission - Discharge Date:21   LOS (days): 3  Geometric Mean LOS (GMLOS) (days): 9 60  Days to GMLOS:7 Previous Discharge Diagnosis:  There are no discharge diagnoses documented for the most recent discharge         Risk of Unplanned Readmission Score  Predictive Model Details          18 (Low)  Factor Value    Calculated 2021 12:03 28% Number of active Rx orders 54    Risk of Unplanned Readmission Model 15% Active NSAID Rx order present     8% Number of ED visits in last six months 2     7% Active antipsychotic Rx order present     7% ECG/EKG order present in last 6 months     7% Latest calcium low (8 5 mg/dL)     6% Diagnosis of electrolyte disorder present     5% Imaging order present in last 6 months     5% Latest hemoglobin low (10 0 g/dL)     3% Active anticoagulant Rx order present     3% Active corticosteroid Rx order present     3% Age 49     2% Current length of stay 2 537 days     1% Future appointment scheduled     1% Charlson Comorbidity Index 1         BUNDLE:      OBJECTIVE:  Pt is a 49y o  year old Single, white or  [1], male with [facility] preference of None admitted on 9/5/2021  5:40 PM  Pt is admitted to [facility] [address] 120-01 at [facility] with complaints of Sepsis ([facility] [address] )   Current admission status: Inpatient  Referral Reason:  (Discharge planning)    Preferred Pharmacy:   [facility], [address] [place] [address] [address] [facility]  [address] [facility]  [address] [facility] [address]  Phone: [phone] Fax: [phone]    Primary Care Provider: Kate Prescott PA-C    Primary Insurance: MEDICARE  Secondary Insurance: [place] MED[place]    ASSESSMENT:  Active Health Care Agents    There are no active Health Care Agents on file  Sharlene Ortez [address] of Residence: [place]    Readmission Root Cause  30 Day Readmission: No    Patient Information  Mental Status:  (Intellectually Disabled)  During Assessment patient was accompanied by: Not accompanied during assessment  Assessment information provided by[de-identified] Sister, Other - please comment (TIANA Bourne)  Primary Caregiver: Private caregiver  Caregiver's Name[de-identified] Gareth Presley Relationship to Patient[de-identified] Other (Specify) (Life Share)  Caregiver's Telephone Number[de-identified] [phone]  Support Systems: Other- Comment Ex  Caodaism, community, neighbor, etc  ([address] [facility] Sister)  What city do you live in?: [facility] entry access options   Select all that apply :  (0 stairs)  Type of Current Residence: Bi-level  Upon entering residence, is there a bedroom on the main floor (no further steps)?: Yes  Upon entering residence, is there a bathroom on the main floor (no further steps)?: Yes  Living Arrangements: Other (Comment) (Life share)  Is patient a ?: No    Patient Information Continued  Income Source: SSI/SSD  Does patient have prescription coverage?: Yes  Does patient receive dialysis treatments?: No  Does patient have a history of Mental Health Diagnosis?: Yes (Intellectually disabled)      Means of Transportation  Means of Transport to Appts[de-identified] Family transport  In the past 12 months, has lack of transportation kept you from medical appointments or from getting medications?: No  In the past 12 months, has lack of transportation kept you from meetings, work, or from getting things needed for daily living?: No  Was application for public transport provided?: No     TC to pt foster sister Levar who provided some information  Pt was just moved about a week ago to a new life share parent named Alisha Anderson ( [phone])  TC to pt Life Share parent who reports the pt lives in her bi-level home  Pt requires assistance with all ADL's and IADl's  Pt CG is requesting a WC and shower chair upon DC  Pt may need a wound vac and will need an RN for same  A post acute care recommendation was made by your care team for [facility]  Discussed Freedom of Choice with caregiver  List of agencies given to caregiver via in person  caregiver aware the list is custom filtered for them by preference  and that [facility]'s post acute providers are designated  A referral was made to [facility]   states she will be able to transport pt home upon discharge

## 2021-09-08 NOTE — ASSESSMENT & PLAN NOTE
61-year-old male history of CP POD#2 s/p I&D/debridement of large abscessed L ischium decubitus ulcer and sepsis with MRSA bacteremia  Denies pain  Wound soft with receeding erythema, purulence expressed from medial aspect, partial/full thickness necrosis noted on skin  AFVSS on room air  WBC 9 6, Hgb 10 0    Blood culture #1 from 9/5: MRSA  Wound culture 9/5: Staph aureus/E  Coli  Wound culture 9/6 (I&D): MRSA    CT pelvis 9/5  Phlegmonous change/early abscess overlying the left gluteus bree muscle measuring 3 4 x 8 4 cm in cross-section and 9 7 cm in craniocaudal dimension    Assessment:  Large abscessed decubitus ulcer of left ischium/buttock with full thickness skin necrosis, MRSA positive  Sepsis with MRSA bacteremia  Plan:  Wound was debrided sharply of necrotic tissue, irrigated, and re-dressed  Continue BID changes, monitoring purulent drainage from medial aspect and necrotic regions  Consider VAC application as purulence/necrosis clears  Continue Vanco therapy per primary team   Dr Bales Contras updated

## 2021-09-08 NOTE — ASSESSMENT & PLAN NOTE
· Secondary to left gluteal abscess, febrile and tachycardic on admission  · 1/2 sets of blood cultures positive for Staph aureus  · Patient status post bedside debridement on 09/06/2021 by surgical team   The patient had another debridement done today  · Will continue IV antibiotics with vancomycin/ aztreonam  · IV fluids as needed  · Will trend procalcitonin  · Cultures growing MRSA  · Repeat blood cultures pending  · Will follow-up echo  · Surgical team following

## 2021-09-08 NOTE — CONSULTS
Consultation - Infectious Disease   Kinga Oconnell 49 y o  male MRN: [id number]  Unit/Bed#: -01 Encounter: [id number]      Inpatient consult to Infectious Diseases  Consult performed by: Eduardo Todd MD  Consult ordered by: Mariah Metz MD            REQUIRED DOCUMENTATION:     1  This service was provided via Telemedicine  2  Provider located at [facility]  3  TeleMed provider: Eduardo Todd MD   4  Identify all parties in room with patient during tele consult:RN  5  After connecting through Qool, patient was identified by name and date of birth and assistant checked wristband  Patient was then informed that this was a Telemedicine visit and that the exam was being conducted confidentially over secure lines  My office door was closed  No one else was in the room  Patient acknowledged consent and understanding of privacy and security of the Telemedicine visit, and gave us permission to have the assistant stay in the room in order to assist with the history and to conduct the exam   I informed the patient that I have reviewed their record in Epic and presented the opportunity for them to ask any questions regarding the visit today  The patient agreed to participate  Assessment/Recommendations     1  Sepsis, present on admission  - Source of sepsis is bacteremia/ gluteal abscess  - Clinically improving, afebrile without leukocytosis    · Management as below    2  Uncomplicated MRSA bacteremia  - Source of bacteremia is infected decubitus ulcer/abscess  - FU blood cultures pending  - TTE negative  - Clinically improving, afebrile without leukocytosis    · Continue current antibiotic therapy with Vancomycin, continue Aztreonam   · FU repeat blood cultures  · Duration of therapy to be defined, anticipate 2 weeks of iv Vancomycin therapy if repeat blood cultures are negative  · PICC can be placed after fu blood cultures have cleared x 48 hours    3   Infected decubitus ulcer with gluteal abscess  - s/p I and D on 9/6, cultures with MRSA, initial wound cultures with MRSA, E coli  - s/p repeat debridement 9/8  - clinically improving    · Antibiotics as above, anticipate 2 weeks of iv Vanco and 7-10 days of gram negative coverage to be completed as outpatient with oral cephalosporin if family agrees to challenge   · Additional management per surgery    4  Cerebral palsy, Type 2 diabetes  - Risk factor for immunosuppression, poor wound healing    · Recommend strict glycemic control to aid with wound healing  · Recommend wound offloading, frequent repositioning, optimize nutrition    5  Penicillin allergy - unknown    · Continue supportive care, could discuss challenge with cephalosporins with POA/family, in the interim continue Aztreonam    Thank you for involving me in the care of your patient  Please call with questions, change in clinical status or if tests recommended above are abnormal      Discussed with the primary service  History     Reason for Consult: Bacteremia  HPI: Monico Turpin is a 49y o  year old male with cerebral palsy, seizure disorder and type 2 diabetes  He has a hx gait dysfunction and freuqent galls  He presented to the ER on 9/5 with buttock pain and soreness, malaise and lethargy  History is limited from the patient and is obtained mainly from the medical records  In the ER, vitals were notable for fever and tachycardia  Labs were notable for lactic acidosis with leukocytosis  EKG noted no acute ST changes  CXR showed no infiltrate and CT pelvis showed an early abscess over left gluteus  He was admitted and started on vanc and aztreonam  He underwent I and d ON 9/6 with copious amounts of pus drained  Undelrying muscle was exposed and healthy  He had a bedside debridement this morning  Blood and tissue cultures have grown MRSA  He is afebrile without leukocytosis  Denies nausea, vomiting, diarrhea or rash and is tolerating antibiotics well     Infectious disease is being consulted for diagnostic work up and antibiotic management  Review of Systems  Pertinent positives and negatives as noted in HPI  Rest complete 12 point system-based review of systems is otherwise negative  PAST MEDICAL HISTORY:  Past Medical History:   Diagnosis Date    [facility]ANA [facility]) ([facility] ) 2019    [facility] ([facility])     Depression     Diabetes ([place] [address] )     Disease of thyroid gland     Gait disorder     Hyperlipidemia     Hypertension     Kidney failure     Kidney stones     Osteoporosis     Psychiatric disorder     Scoliosis     Seizures ([place] [address] )     Thyroid disease     UTI (urinary tract infection)      Past Surgical History:   Procedure Laterality Date    APPENDECTOMY         FAMILY HISTORY:  Non-contributory    SOCIAL HISTORY:  Social History   Single  Social History     Substance and Sexual Activity   Alcohol Use Never     Social History     Substance and Sexual Activity   Drug Use No     Social History     Tobacco Use   Smoking Status Never Smoker   Smokeless Tobacco Never Used       ALLERGIES:  Allergies   Allergen Reactions    Erythromycin Other (See Comments)     Unknown per pt    Penicillins Other (See Comments)     Unknown per pt       MEDICATIONS:  All current active medications have been reviewed      Physical Exam     Temp:  [97 3 °F (36 3 °C)-98 9 °F (37 2 °C)] 97 3 °F (36 3 °C)  HR:  [77-92] 77  Resp:  [18] 18  BP: (131-160)/(63-69) 131/63  SpO2:  [97 %-98 %] 98 %  Temp (24hrs), Av 1 °F (36 7 °C), Min:97 3 °F (36 3 °C), Max:98 9 °F (37 2 °C)  Current: Temperature: (!) 97 3 °F (36 3 °C)    Intake/Output Summary (Last 24 hours) at 2021 1031  Last data filed at 2021 1401  Gross per 24 hour   Intake 960 ml   Output 700 ml   Net 260 ml         Physical exam findings reported by bedside and primary medical team staff    General Appearance:  Appearing well, nontoxic, and in no distress, appears stated age   Head:  Normocephalic, without obvious abnormality, atraumatic   Eyes:  PERRL, conjunctiva pink and sclera anicteric, both eyes   Nose: Nares normal, mucosa normal, no drainage   Throat: Oropharynx moist without lesions; lips, mucosa, and tongue normal; teeth and gums normal   Neck: Supple, symmetrical, trachea midline, no adenopathy, no tenderness/mass/nodules   Back:   Symmetric, no curvature, ROM normal, no CVA tenderness   Lungs:   Clear to auscultation bilaterally, no audible wheezes, rhonchi and rales, respirations unlabored   Chest Wall:  No tenderness or deformity   Heart:  Regular rate and rhythm, S1, S2 normal, no murmur, rub or gallop   Abdomen:   Soft, non-tender, non-distended, positive bowel sounds, no masses, no organomegaly    No CVA tenderness   Extremities: Extremities normal, atraumatic, no cyanosis, clubbing or edema   Skin: Buttock wound with packing in place   Lymph nodes: Cervical, supraclavicular, and axillary nodes normal   Neurologic: Drowsy, limited verbal responses       Invasive Devices:   Peripheral IV 09/05/21 Right Arm (Active)   Site Assessment WDL; Clean;Dry; Intact 09/07/21 1400   Dressing Type Transparent;Securing device 09/07/21 1400   Line Status Blood return noted; Flushed;Saline locked 09/07/21 1400   Dressing Status Clean;Dry; Intact 09/07/21 1400   Dressing Change Due 09/09/21 09/06/21 0939   Reason Not Rotated Not due 09/06/21 0939       Labs, Imaging, & Other Studies     Lab Results:    I have personally reviewed pertinent labs      Results from last 7 days   Lab Units 09/08/21  0517 09/07/21  0537 09/06/21  0443   WBC Thousand/uL 9 60 10 70 16 90*   HEMOGLOBIN g/dL 10 0* 9 9* 9 9*   PLATELETS Thousands/uL 264 238 212     Results from last 7 days   Lab Units 09/08/21  0517 09/05/21  1906   POTASSIUM mmol/L 3 9 3 9   CHLORIDE mmol/L 102 92*   CO2 mmol/L 28 27   BUN mg/dL 10 13   CREATININE mg/dL 0 44* 0 67*   EGFR ml/min/1 73sq m 129 109   CALCIUM mg/dL 8 5* 8 5*   AST U/L  --  36   ALT U/L  --  30   ALK PHOS U/L  --  50     Results from last 7 days   Lab Units 09/06/21  0927 09/05/21 2015 09/05/21  1906   BLOOD CULTURE   --   --  Methicillin Resistant Staphylococcus aureus*  No Growth at 24 hrs  GRAM STAIN RESULT  2+ Polys*  3+ Gram positive cocci in pairs, chains and clusters* 3+ Gram positive cocci in pairs, chains and clusters*  No polys seen* Gram positive cocci in clusters*   WOUND CULTURE  3+ Growth of Methicillin Resistant Staphylococcus aureus* 4+ Growth of Staphylococcus aureus*  1+ Growth of Escherichia coli*  --        Imaging Studies:   I have personally reviewed pertinent imaging study reports and images in PACS  EKG, Pathology, and Other Studies:   I have personally reviewed pertinent reports  Counseling/Coordination of care: Total 70 minutes communication with the patient via telehealth  Labs, medical tests and imaging studies were independently and extensively reviewed by me as noted above in HPI and old records were obtained and summarized as noted above in HPI  My recommendations were discussed with the patient in detail who verbalized understanding

## 2021-09-08 NOTE — PROGRESS NOTES
[facility]  Progress Note Gabriella Khan 1965, 49 y o  male MRN: [id number]  Unit/Bed#: -01 Encounter: [id number]  Primary Care Provider: Brook Smith PA-C   Date and time admitted to hospital: 9/5/2021  5:40 PM    * Sepsis [facility])  Assessment & Plan  · Secondary to left gluteal abscess, febrile and tachycardic on admission  · 1/2 sets of blood cultures positive for Staph aureus  · Patient status post bedside debridement on 09/06/2021 by surgical team   The patient had another debridement done today  · Will continue IV antibiotics with vancomycin/ aztreonam  · IV fluids as needed  · Will trend procalcitonin  · Cultures growing MRSA  · Repeat blood cultures pending  · Will follow-up echo  · Surgical team following    Bacteremia due to Gram-positive bacteria  Assessment & Plan  · Blood cultures 1 of 2 positive for MRSA  · Continue antibiotics as above    Gluteal abscess  Assessment & Plan  · Surgical team following  Will continue treatment as above    Hyponatremia  Assessment & Plan  · Likely secondary to hypovolemia, improved with IV fluids    Metabolic encephalopathy  Assessment & Plan  · Likely secondary to sepsis/bacteremia  · Mental status appears to be back at baseline  · Will continue supportive care    Essential hypertension  Assessment & Plan  BP stable  Continue lisinopril    Generalized convulsive epilepsy ([facility] [address] )  Assessment & Plan  Continue pre-hospital Keppra 1500 mg p o  B i d , Depakote  mg p o  Daily and 1000 mg p o  Q h s , Klonopin 0 25 mg p o  B i d  and Vimpat 200 mg p o  B i d  Type 2 diabetes mellitus without complication, without long-term current use of insulin ([facility])  Assessment & Plan  · Diabetic diet  · Insulin sliding scale      VTE Prophylaxis:  Heparin    Patient Centered Rounds: I have performed bedside rounds with nursing staff today      Discussions with Specialists or Other Care Team Provider: yes  Education and Discussions with Family / Patient:  Attempted to call family at number provided in chart with no answer    Current Length of Stay: 3 day(s)    Current Patient Status: Inpatient   Certification Statement: The patient will continue to require additional inpatient hospital stay due to Bacteremia    Discharge Plan:  Pending hospital course    Code Status: Level 1 - Full Code    Subjective:   Patient had bedside debridement again today with surgical team   Tolerating diet  Afebrile  Objective:     Vitals:   Temp (24hrs), Av 1 °F (36 7 °C), Min:97 3 °F (36 3 °C), Max:98 9 °F (37 2 °C)    Temp:  [97 3 °F (36 3 °C)-98 9 °F (37 2 °C)] 97 3 °F (36 3 °C)  HR:  [77-92] 77  Resp:  [18] 18  BP: (131-160)/(63-69) 131/63  SpO2:  [97 %-98 %] 98 %  Body mass index is 22 24 kg/m²  Input and Output Summary (last 24 hours): Intake/Output Summary (Last 24 hours) at 2021 1355  Last data filed at 2021 1027  Gross per 24 hour   Intake 730 ml   Output 1100 ml   Net -370 ml       Physical Exam:   Physical Exam  Constitutional:       General: He is not in acute distress  Comments: Frail  male   HENT:      Head: Normocephalic and atraumatic  Nose: Nose normal       Mouth/Throat:      Mouth: Mucous membranes are moist    Eyes:      Extraocular Movements: Extraocular movements intact  Conjunctiva/sclera: Conjunctivae normal    Cardiovascular:      Rate and Rhythm: Normal rate and regular rhythm  Pulmonary:      Effort: Pulmonary effort is normal  No respiratory distress  Abdominal:      Palpations: Abdomen is soft  Tenderness: There is no abdominal tenderness  Musculoskeletal:         General: No swelling or tenderness  Cervical back: Normal range of motion and neck supple  Skin:     General: Skin is warm and dry  Neurological:      Mental Status: He is alert        Comments: Baseline cognitive impairment   Psychiatric:         Mood and Affect: Mood normal          Behavior: Behavior normal  Additional Data:     Labs:    Results from last 7 days   Lab Units 09/08/21  0517 09/06/21  0443   WBC Thousand/uL 9 60 16 90*   HEMOGLOBIN g/dL 10 0* 9 9*   HEMATOCRIT % 29 4* 29 2*   PLATELETS Thousands/uL 264 212   LYMPHO PCT %  --  16*   MONO PCT %  --  16*     Results from last 7 days   Lab Units 09/08/21  0517 09/05/21  1906   SODIUM mmol/L 137 132*   POTASSIUM mmol/L 3 9 3 9   CHLORIDE mmol/L 102 92*   CO2 mmol/L 28 27   BUN mg/dL 10 13   CREATININE mg/dL 0 44* 0 67*   CALCIUM mg/dL 8 5* 8 5*   ALK PHOS U/L  --  50   ALT U/L  --  30   AST U/L  --  36         Results from last 7 days   Lab Units 09/08/21  1055 09/08/21  0636 09/07/21  2127 09/07/21  1629 09/07/21  1123 09/07/21  0608 09/06/21  2105 09/06/21  1638 09/06/21  1122 09/06/21  0603 09/06/21  0007   POC GLUCOSE mg/dl 170* 93 122 105 141* 113 119 114 130 99 124           * I Have Reviewed All Lab Data Listed Above  * Additional Pertinent Lab Tests Reviewed: Daisy [address] Admission  Reviewed    Imaging:  Imaging Reports Reviewed Today Include:  No new imaging    Recent Cultures (last 7 days):     Results from last 7 days   Lab Units 09/08/21  0516 09/06/21  0927 09/05/21  2015 09/05/21  1906   BLOOD CULTURE  Received in Microbiology Lab  Culture in Progress  Received in Microbiology Lab  Culture in Progress  --   --  Methicillin Resistant Staphylococcus aureus*  No Growth at 24 hrs     GRAM STAIN RESULT   --  2+ Polys*  3+ Gram positive cocci in pairs, chains and clusters* 3+ Gram positive cocci in pairs, chains and clusters*  No polys seen* Gram positive cocci in clusters*   WOUND CULTURE   --  3+ Growth of Methicillin Resistant Staphylococcus aureus* 4+ Growth of Staphylococcus aureus*  1+ Growth of Escherichia coli*  --        Last 24 Hours Medication List:   Current Facility-Administered Medications   Medication Dose Route Frequency Provider Last Rate    aztreonam  2,000 mg Intravenous Irene Ascencio MD  benzonatate  200 mg Oral TID PRN Lucyann Calender, PA-C      benztropine  0 5 mg Oral BID Lucyann Calender, PA-C      calcium carbonate  1 tablet Oral TID With Meals Lucyann Calender, PA-C      cholecalciferol  1,000 Units Oral Daily Lucyann Calender, PA-C      clonazePAM  0 25 mg Oral BID PRN Lucyann Calender, PA-C      vitamin B-12  1,000 mcg Oral Daily Lucyann Calender, PA-C      divalproex sodium  1,000 mg Oral HS Lucyann Calender, PA-C      divalproex sodium  500 mg Oral Daily Lucyann Calender, PA-C      gabapentin  100 mg Oral TID Lucyann Calender, PA-C      heparin (porcine)  5,000 Units Subcutaneous [facility] Juaquin Nathan MD      hydrOXYzine HCL  50 mg Oral TID Lucyann Calender, PA-C      insulin lispro  1-5 Units Subcutaneous HS [place], PA-C      insulin lispro  2-12 Units Subcutaneous TID AC [place] [facility] [facility], PA-C      lacosamide  200 mg Oral Q12H [address] Lucyann Calender, PA-C      levETIRAcetam  1,500 mg Oral BID Lucyann Calender, PA-C      levothyroxine  125 mcg Oral Early Morning Lucyann Calender, PA-C      lisinopril  10 mg Oral Daily Lucyann Calender, PA-C      loratadine  10 mg Oral Daily Lucyann Calender, PA-C      polyethylene glycol  17 g Oral Daily Lucyann Calender, PA-C      pravastatin  80 mg Oral Daily With [employer], PA-C      QUEtiapine  200 mg Oral BID (AM & Afternoon) Lucyann Calender, PA-C      QUEtiapine  400 mg Oral HS Lucyann Calender, PA-C      temazepam  15 mg Oral HS Lucyann Calender, PA-C      vancomycin  1,750 mg Intravenous Q8H Lucyann Calender, PA-C          Today, Patient Was Seen By: Juaquin Nathan MD    ** Please Note: Dictation voice to text software may have been used in the creation of this document   **

## 2021-09-08 NOTE — PROCEDURES
Procedure Note - General Surgery   Yanet Johnson 49 y o  male MRN: [id number]  Unit/Bed#: -01 Encounter: [id number]    Preoperative diagnosis: decubitus ulcer L ischium stage IV    Post operative diagnosis: decubitus ulcer L ischium stage IV    Procedure:  Sharp debridement    Surgeon:  Nia Patel PA-C    Assistant:  none    Anesthesia:  none    Estimated blood loss:  minimal    Complications:  none    Specimens:  none    Indications for procedure 48 yo M admitted with infected decubitus ulcer L ischium, s/p I&D for which follow-up debridement now indicated    Operative findings:  Partial and full thickness skin necrosis noted  Necrotic soft tissue in wound bed  Debrided tissue totaling about 2 cm x 5 cm (10 cm sq)  Operative technique positioned R lateral decubitus  Dressing removed  Wound cleansed and irrigated  Full thickness necrotic skin at the wound edge sharply excised with iris scissors  Superficial layer of partial thickness necrotic skin in the darrell-wound sharply excised with iris scissors  Excision carried down close to viable tissue  Small amount of necrotic slough debrided from the wound bed  Wound repacked and dressed  Tolerated well without complications      Signature:  Mark Patel PA-C  Date: 9/8/2021 Time: 10:17 AM

## 2021-09-09 LAB
ANION GAP SERPL CALCULATED.3IONS-SCNC: 7 MMOL/L (ref 4–13)
BACTERIA WND AEROBE CULT: ABNORMAL
BACTERIA WND AEROBE CULT: ABNORMAL
BUN SERPL-MCNC: 13 MG/DL (ref 7–25)
CALCIUM SERPL-MCNC: 9.1 MG/DL (ref 8.6–10.5)
CHLORIDE SERPL-SCNC: 104 MMOL/L (ref 98–107)
CO2 SERPL-SCNC: 28 MMOL/L (ref 21–31)
CREAT SERPL-MCNC: 0.41 MG/DL (ref 0.7–1.3)
ERYTHROCYTE [DISTWIDTH] IN BLOOD BY AUTOMATED COUNT: 13.4 % (ref 11.5–14.5)
GFR SERPL CREATININE-BSD FRML MDRD: 133 ML/MIN/1.73SQ M
GLUCOSE SERPL-MCNC: 108 MG/DL (ref 65–140)
GLUCOSE SERPL-MCNC: 115 MG/DL (ref 65–140)
GLUCOSE SERPL-MCNC: 115 MG/DL (ref 65–99)
GLUCOSE SERPL-MCNC: 132 MG/DL (ref 65–140)
GLUCOSE SERPL-MCNC: 137 MG/DL (ref 65–140)
GRAM STN SPEC: ABNORMAL
GRAM STN SPEC: ABNORMAL
HCT VFR BLD AUTO: 32.2 % (ref 42–47)
HGB BLD-MCNC: 10.8 G/DL (ref 14–18)
MAGNESIUM SERPL-MCNC: 1.4 MG/DL (ref 1.9–2.7)
MCH RBC QN AUTO: 32.3 PG (ref 26–34)
MCHC RBC AUTO-ENTMCNC: 33.5 G/DL (ref 31–37)
MCV RBC AUTO: 97 FL (ref 81–99)
PLATELET # BLD AUTO: 333 THOUSANDS/UL (ref 149–390)
PMV BLD AUTO: 8.5 FL (ref 8.6–11.7)
POTASSIUM SERPL-SCNC: 4 MMOL/L (ref 3.5–5.5)
PROCALCITONIN SERPL-MCNC: 0.11 NG/ML
RBC # BLD AUTO: 3.34 MILLION/UL (ref 4.3–5.9)
SODIUM SERPL-SCNC: 139 MMOL/L (ref 134–143)
VANCOMYCIN TROUGH SERPL-MCNC: 19.8 UG/ML (ref 5–12)
WBC # BLD AUTO: 10.3 THOUSAND/UL (ref 4.8–10.8)

## 2021-09-09 PROCEDURE — 11045 DBRDMT SUBQ TISS EACH ADDL: CPT | Performed by: PHYSICIAN ASSISTANT

## 2021-09-09 PROCEDURE — 80048 BASIC METABOLIC PNL TOTAL CA: CPT | Performed by: INTERNAL MEDICINE

## 2021-09-09 PROCEDURE — 11042 DBRDMT SUBQ TIS 1ST 20SQCM/<: CPT | Performed by: SURGERY

## 2021-09-09 PROCEDURE — 82948 REAGENT STRIP/BLOOD GLUCOSE: CPT

## 2021-09-09 PROCEDURE — 99232 SBSQ HOSP IP/OBS MODERATE 35: CPT | Performed by: INTERNAL MEDICINE

## 2021-09-09 PROCEDURE — 84145 PROCALCITONIN (PCT): CPT | Performed by: INTERNAL MEDICINE

## 2021-09-09 PROCEDURE — 0JB90ZZ EXCISION OF BUTTOCK SUBCUTANEOUS TISSUE AND FASCIA, OPEN APPROACH: ICD-10-PCS | Performed by: SURGERY

## 2021-09-09 PROCEDURE — 85027 COMPLETE CBC AUTOMATED: CPT | Performed by: INTERNAL MEDICINE

## 2021-09-09 PROCEDURE — 83735 ASSAY OF MAGNESIUM: CPT | Performed by: INTERNAL MEDICINE

## 2021-09-09 PROCEDURE — 80202 ASSAY OF VANCOMYCIN: CPT | Performed by: PHYSICIAN ASSISTANT

## 2021-09-09 PROCEDURE — 99024 POSTOP FOLLOW-UP VISIT: CPT | Performed by: SURGERY

## 2021-09-09 RX ORDER — MAGNESIUM SULFATE HEPTAHYDRATE 40 MG/ML
2 INJECTION, SOLUTION INTRAVENOUS ONCE
Status: COMPLETED | OUTPATIENT
Start: 2021-09-09 | End: 2021-09-10

## 2021-09-09 RX ADMIN — PRAVASTATIN SODIUM 80 MG: 40 TABLET ORAL at 16:47

## 2021-09-09 RX ADMIN — HEPARIN SODIUM 5000 UNITS: 5000 INJECTION INTRAVENOUS; SUBCUTANEOUS at 14:54

## 2021-09-09 RX ADMIN — HYDROXYZINE HYDROCHLORIDE 50 MG: 25 TABLET ORAL at 16:49

## 2021-09-09 RX ADMIN — GABAPENTIN 100 MG: 100 CAPSULE ORAL at 22:06

## 2021-09-09 RX ADMIN — LACOSAMIDE 200 MG: 150 TABLET, FILM COATED ORAL at 08:26

## 2021-09-09 RX ADMIN — LEVETIRACETAM 1500 MG: 500 TABLET, FILM COATED ORAL at 08:25

## 2021-09-09 RX ADMIN — VANCOMYCIN HYDROCHLORIDE 1750 MG: 500 INJECTION, POWDER, LYOPHILIZED, FOR SOLUTION INTRAVENOUS at 16:47

## 2021-09-09 RX ADMIN — HYDROXYZINE HYDROCHLORIDE 50 MG: 25 TABLET ORAL at 22:05

## 2021-09-09 RX ADMIN — HYDROXYZINE HYDROCHLORIDE 50 MG: 25 TABLET ORAL at 08:25

## 2021-09-09 RX ADMIN — GABAPENTIN 100 MG: 100 CAPSULE ORAL at 16:49

## 2021-09-09 RX ADMIN — VANCOMYCIN HYDROCHLORIDE 1750 MG: 500 INJECTION, POWDER, LYOPHILIZED, FOR SOLUTION INTRAVENOUS at 08:40

## 2021-09-09 RX ADMIN — Medication 1000 UNITS: at 08:25

## 2021-09-09 RX ADMIN — CALCIUM 1 TABLET: 500 TABLET ORAL at 16:49

## 2021-09-09 RX ADMIN — BENZTROPINE MESYLATE 0.5 MG: 0.5 TABLET ORAL at 17:00

## 2021-09-09 RX ADMIN — DIVALPROEX SODIUM 1000 MG: 500 TABLET, EXTENDED RELEASE ORAL at 22:04

## 2021-09-09 RX ADMIN — LEVETIRACETAM 1500 MG: 500 TABLET, FILM COATED ORAL at 17:00

## 2021-09-09 RX ADMIN — LISINOPRIL 10 MG: 10 TABLET ORAL at 08:25

## 2021-09-09 RX ADMIN — QUETIAPINE FUMARATE 400 MG: 50 TABLET, EXTENDED RELEASE ORAL at 22:05

## 2021-09-09 RX ADMIN — DIVALPROEX SODIUM 500 MG: 500 TABLET, EXTENDED RELEASE ORAL at 08:26

## 2021-09-09 RX ADMIN — LACOSAMIDE 200 MG: 150 TABLET, FILM COATED ORAL at 22:05

## 2021-09-09 RX ADMIN — Medication 2000 MG: at 22:08

## 2021-09-09 RX ADMIN — LEVOTHYROXINE SODIUM 125 MCG: 25 TABLET ORAL at 06:11

## 2021-09-09 RX ADMIN — CYANOCOBALAMIN TAB 500 MCG 1000 MCG: 500 TAB at 08:25

## 2021-09-09 RX ADMIN — Medication 2000 MG: at 14:54

## 2021-09-09 RX ADMIN — CALCIUM 1 TABLET: 500 TABLET ORAL at 08:26

## 2021-09-09 RX ADMIN — HEPARIN SODIUM 5000 UNITS: 5000 INJECTION INTRAVENOUS; SUBCUTANEOUS at 06:11

## 2021-09-09 RX ADMIN — QUETIAPINE FUMARATE 200 MG: 50 TABLET, EXTENDED RELEASE ORAL at 14:54

## 2021-09-09 RX ADMIN — CALCIUM 1 TABLET: 500 TABLET ORAL at 11:48

## 2021-09-09 RX ADMIN — Medication 2000 MG: at 06:11

## 2021-09-09 RX ADMIN — HEPARIN SODIUM 5000 UNITS: 5000 INJECTION INTRAVENOUS; SUBCUTANEOUS at 22:02

## 2021-09-09 RX ADMIN — BENZTROPINE MESYLATE 0.5 MG: 0.5 TABLET ORAL at 08:26

## 2021-09-09 RX ADMIN — VANCOMYCIN HYDROCHLORIDE 1750 MG: 500 INJECTION, POWDER, LYOPHILIZED, FOR SOLUTION INTRAVENOUS at 00:47

## 2021-09-09 RX ADMIN — GABAPENTIN 100 MG: 100 CAPSULE ORAL at 08:25

## 2021-09-09 RX ADMIN — LORATADINE 10 MG: 10 TABLET ORAL at 08:25

## 2021-09-09 RX ADMIN — MAGNESIUM SULFATE IN WATER 2 G: 40 INJECTION, SOLUTION INTRAVENOUS at 14:54

## 2021-09-09 RX ADMIN — QUETIAPINE FUMARATE 200 MG: 50 TABLET, EXTENDED RELEASE ORAL at 08:25

## 2021-09-09 RX ADMIN — TEMAZEPAM 15 MG: 15 CAPSULE ORAL at 22:05

## 2021-09-09 NOTE — ASSESSMENT & PLAN NOTE
· Secondary to left gluteal abscess, febrile and tachycardic on admission  · 1/2 sets of blood cultures positive for MRSA  · Patient status post bedside debridement on 09/06/2021 by surgical team   The patient had another debridement done today  · Will continue IV antibiotics with vancomycin/ aztreonam  · IV fluids as needed  · Will trend procalcitonin  · Cultures growing MRSA  · Repeat blood cultures with no growth for 24 hours  · Echo with no obvious vegetations  · Surgical team following

## 2021-09-09 NOTE — PROGRESS NOTES
[facility]  Progress Note Kinga Mendez 1965, 49 y o  male MRN: [id number]  Unit/Bed#: -01 Encounter: [id number]  Primary Care Provider: Gabriela Reid PA-C   Date and time admitted to hospital: 9/5/2021  5:40 PM    * Sepsis [facility])  Assessment & Plan  · Secondary to left gluteal abscess, febrile and tachycardic on admission  · 1/2 sets of blood cultures positive for MRSA  · Patient status post bedside debridement on 09/06/2021 by surgical team   The patient had another debridement done today  · Will continue IV antibiotics with vancomycin/ aztreonam  · IV fluids as needed  · Will trend procalcitonin  · Cultures growing MRSA  · Repeat blood cultures with no growth for 24 hours  · Echo with no obvious vegetations  · Surgical team following    Bacteremia due to Gram-positive bacteria  Assessment & Plan  · Blood cultures 1 of 2 positive for MRSA  · Continue antibiotics as above    Gluteal abscess  Assessment & Plan  · Surgical team following  Will continue treatment as above    Hyponatremia  Assessment & Plan  · Likely secondary to hypovolemia, improved with IV fluids    Metabolic encephalopathy  Assessment & Plan  · Likely secondary to sepsis/bacteremia  · Mental status appears to be back at baseline  · Will continue supportive care    Essential hypertension  Assessment & Plan  BP stable  Continue lisinopril    Generalized convulsive epilepsy ([facility] [address] )  Assessment & Plan  Continue pre-hospital Keppra 1500 mg p o  B i d , Depakote  mg p o  Daily and 1000 mg p o  Q h s , Klonopin 0 25 mg p o  B i d  and Vimpat 200 mg p o  B i d  Type 2 diabetes mellitus without complication, without long-term current use of insulin ([facility])  Assessment & Plan  · Diabetic diet  · Insulin sliding scale      VTE Prophylaxis:  Heparin    Patient Centered Rounds: I have performed bedside rounds with nursing staff today      Discussions with Specialists or Other Care Team Provider: yes  Education and Discussions with Family / Patient:  Spoke with caretakers at bedside regarding plan of care    Current Length of Stay: 4 day(s)    Current Patient Status: Inpatient   Certification Statement: The patient will continue to require additional inpatient hospital stay due to Sepsis    Discharge Plan:  Pending hospital course    Code Status: Level 1 - Full Code    Subjective:   Patient had further debridement at bedside done with surgical team   Currently afebrile  Tolerating diet  Objective:     Vitals:   Temp (24hrs), Av 7 °F (36 5 °C), Min:97 3 °F (36 3 °C), Max:98 3 °F (36 8 °C)    Temp:  [97 3 °F (36 3 °C)-98 3 °F (36 8 °C)] 97 6 °F (36 4 °C)  HR:  [86-89] 89  Resp:  [16-18] 18  BP: (136-140)/(68-82) 140/79  SpO2:  [94 %-97 %] 94 %  Body mass index is 22 24 kg/m²  Input and Output Summary (last 24 hours): Intake/Output Summary (Last 24 hours) at 2021 1353  Last data filed at 2021 0900  Gross per 24 hour   Intake 2080 ml   Output 2200 ml   Net -120 ml       Physical Exam:   Physical Exam  Constitutional:       General: He is not in acute distress  Comments: Frail  male   HENT:      Head: Normocephalic and atraumatic  Nose: Nose normal       Mouth/Throat:      Mouth: Mucous membranes are moist    Eyes:      Extraocular Movements: Extraocular movements intact  Conjunctiva/sclera: Conjunctivae normal    Cardiovascular:      Rate and Rhythm: Normal rate and regular rhythm  Pulmonary:      Effort: Pulmonary effort is normal  No respiratory distress  Abdominal:      Palpations: Abdomen is soft  Tenderness: There is no abdominal tenderness  Musculoskeletal:      Cervical back: Normal range of motion and neck supple  Comments: Generalized weakness   Skin:     General: Skin is warm and dry  Comments: Left gluteal abscess with dressing in place   Neurological:      General: No focal deficit present  Mental Status: He is alert   Mental status is at baseline  Cranial Nerves: No cranial nerve deficit  Psychiatric:         Mood and Affect: Mood normal          Behavior: Behavior normal       Comments: Baseline cognitive impairment         Additional Data:     Labs:    Results from last 7 days   Lab Units 09/09/21  0606 09/06/21  0443   WBC Thousand/uL 10 30 16 90*   HEMOGLOBIN g/dL 10 8* 9 9*   HEMATOCRIT % 32 2* 29 2*   PLATELETS Thousands/uL 333 212   LYMPHO PCT %  --  16*   MONO PCT %  --  16*     Results from last 7 days   Lab Units 09/09/21  0606 09/05/21  1906   SODIUM mmol/L 139 132*   POTASSIUM mmol/L 4 0 3 9   CHLORIDE mmol/L 104 92*   CO2 mmol/L 28 27   BUN mg/dL 13 13   CREATININE mg/dL 0 41* 0 67*   CALCIUM mg/dL 9 1 8 5*   ALK PHOS U/L  --  50   ALT U/L  --  30   AST U/L  --  36         Results from last 7 days   Lab Units 09/09/21  1053 09/09/21  0612 09/08/21 2129 09/08/21  1540 09/08/21  1055 09/08/21  0636 09/07/21  2127 09/07/21  1629 09/07/21  1123 09/07/21  0608 09/06/21  2105 09/06/21  1638   POC GLUCOSE mg/dl 137 115 131 187* 170* 93 122 105 141* 113 119 114           * I Have Reviewed All Lab Data Listed Above  * Additional Pertinent Lab Tests Reviewed: Daisy [address] Admission  Reviewed    Imaging:  Imaging Reports Reviewed Today Include:  No new imaging    Recent Cultures (last 7 days):     Results from last 7 days   Lab Units 09/08/21  0516 09/06/21  0927 09/05/21 2015 09/05/21  1906   BLOOD CULTURE  No Growth at 24 hrs  No Growth at 24 hrs   --   --  No Growth at 48 hrs    Methicillin Resistant Staphylococcus aureus*   GRAM STAIN RESULT   --  2+ Polys*  3+ Gram positive cocci in pairs, chains and clusters* 3+ Gram positive cocci in pairs, chains and clusters*  No polys seen* Gram positive cocci in clusters*   WOUND CULTURE   --  3+ Growth of Methicillin Resistant Staphylococcus aureus* 4+ Growth of Methicillin Resistant Staphylococcus aureus*  1+ Growth of Escherichia coli*  --        Last 24 Hours Medication List:   Current Facility-Administered Medications   Medication Dose Route Frequency Provider Last Rate    aztreonam  2,000 mg Intravenous Q8H Polly Gordillo MD 2,000 mg (09/09/21 9854)    benzonatate  200 mg Oral TID PRN [place] JAIR Arriaga      benztropine  0 5 mg Oral BID [place] JAIR Arriaga      calcium carbonate  1 tablet Oral TID With Meals Lorfarnaz Arriaga PA-C      cholecalciferol  1,000 Units Oral Daily [place] JAIR Arriaga      clonazePAM  0 25 mg Oral BID PRN [place] JAIR Arriaga      vitamin B-12  1,000 mcg Oral Daily [place] JAIR Arriaga      divalproex sodium  1,000 mg Oral HS [place] JAIR Arriaga      divalproex sodium  500 mg Oral Daily [place] JAIR Arriaga      gabapentin  100 mg Oral TID [place] JAIR Arriaga      heparin (porcine)  5,000 Units Subcutaneous [facility] Polly Gordillo MD      hydrOXYzine HCL  50 mg Oral TID [place] JAIR Arriaga      insulin lispro  1-5 Units Subcutaneous HS [facility]      insulin lispro  2-12 Units Subcutaneous TID AC [facility]      lacosamide  200 mg Oral Q12H [facility] Lorence JAIR Arriaga      levETIRAcetam  1,500 mg Oral BID [place] JAIR Arriaga      levothyroxine  125 mcg Oral Early Morning [place] JAIR Arriaga      lisinopril  10 mg Oral Daily [place] JAIR Arriaga      loratadine  10 mg Oral Daily [place] JAIR Arriaga      magnesium sulfate  2 g Intravenous Once Polly Gordillo MD      polyethylene glycol  17 g Oral Daily [place] JAIR Arriaga      pravastatin  80 mg Oral Daily With [employer]      QUEtiapine  200 mg Oral BID (AM & Afternoon) [place] JAIR Arriaga      QUEtiapine  400 mg Oral HS [place] JAIR Arriaga      temazepam  15 mg Oral HS [place] JAIR Arriaga      vancomycin  1,750 mg Intravenous Q8H Lorence MORENA ArriagaC 1,750 mg (09/09/21 0840)        Today, Patient Was Seen By: Polly Gordillo MD    ** Please Note: Dictation voice to text software may have been used in the creation of this document   **

## 2021-09-09 NOTE — PROCEDURES
Preoperative diagnosis: decubitus ulcer L ischium stage IV     Post operative diagnosis: decubitus ulcer L ischium stage IV     Procedure:  Sharp debridement     Surgeon:  Francis Jeong MD     Assistant:  none     Anesthesia:  none     Estimated blood loss:  minimal     Complications:  none     Specimens:  none     Indications for procedure 50 yo M admitted with infected decubitus ulcer L ischium, s/p I&D and subsequent bedside debridement for which follow-up debridement now indicated     Operative findings:  Partial and full thickness skin necrosis noted  Necrotic soft tissue around wound edges and surrounding skin  Debrided tissue totaling about 3 cm x 10 cm (30 cm sq)     Operative technique positioned R lateral decubitus  Dressing removed  Wound cleansed and irrigated  Full thickness necrotic skin at the wound edges sharply excised with iris scissors  Superficial layer of partial thickness necrotic skin in the darrell-wound sharply excised with iris scissors  Excision carried down close to viable tissue  Small amount of necrotic slough debrided from the wound bed  Wound repacked and dressed  Tolerated well without complications

## 2021-09-09 NOTE — PROGRESS NOTES
Progress Note - General Surgery   Shawanda Ovalles 49 y o  male MRN: [id number]  Unit/Bed#: -01 Encounter: [id number]    Assessment/Plan     56M with cerebral palsy, now admitted with MRSA bacteremia secondary to a large left gluteal abscess  Status post bedside incision and drainage and ongoing daily debridement of necrotic tissue and wound care, WBC normalized and patient afebrile and clinically stable  Continues on vancomycin and aztreonam with plan for PICC placement and discharge on IV antibiotic therapy  Dressing changed at bedside, cavity probed and cleansed aggressively, some undrained purulence at lateral aspect encountered and drained  Surrounding skin appears ischemic and necrotic in certain areas  This was sharply debrided yesterday and I again debrided some additional tissue today back to healthy bleeding tissue with sensation  Additional skin and soft tissue has yet to declare itself in terms of survivability  May require additional bedside debridement  Would continue aggressive wound care BID and as needed as per the orders and will continue to monitor the wound and surrounding cellulitis  May benefit from wound VAC after the wound is a bit  but we are not ready for that yet  Will continue to follow  Subjective/Objective     Subjective: no acute events, afebrile, on IV antibiotics, cellulitis improving, wound debrided yesterday    Objective:     Blood pressure 140/79, pulse 89, temperature 97 6 °F (36 4 °C), temperature source Tympanic, resp  rate 18, height 6' (1 829 m), weight 74 4 kg (164 lb), SpO2 94 %  ,Body mass index is 22 24 kg/m²  Intake/Output Summary (Last 24 hours) at 9/9/2021 1306  Last data filed at 9/9/2021 0900  Gross per 24 hour   Intake 2080 ml   Output 2200 ml   Net -120 ml       Invasive Devices     Peripheral Intravenous Line            Peripheral IV 09/08/21 Right;Ventral (anterior) Forearm <1 day                Physical Exam  Vitals reviewed     Constitutional: General: He is not in acute distress  Appearance: He is not toxic-appearing  HENT:      Mouth/Throat:      Mouth: Mucous membranes are moist    Cardiovascular:      Rate and Rhythm: Normal rate  Pulmonary:      Effort: Pulmonary effort is normal  No respiratory distress  Skin:     General: Skin is warm and dry  Comments: Large left buttock wound with clean base and visible pink muscle, with expression of additional purulence drained from lateral aspect of the wound, surrounding overlying skin with evidence of necrosis and debrided sharply with a scissors as the patient is insensate in these areas, approximately a 2-3cm wide area of tissue was debrided circumferentially around the wound edges with good effect back to bleeding healthy tissue, cellulitis nearly resolved in the surrounding skin   Neurological:      Mental Status: He is alert  Lab, Imaging and other studies:  I have personally reviewed pertinent lab results    , CBC:   Lab Results   Component Value Date    WBC 10 30 09/09/2021    HGB 10 8 (L) 09/09/2021    HCT 32 2 (L) 09/09/2021    MCV 97 09/09/2021     09/09/2021    MCH 32 3 09/09/2021    MCHC 33 5 09/09/2021    RDW 13 4 09/09/2021    MPV 8 5 (L) 09/09/2021   , CMP:   Lab Results   Component Value Date    SODIUM 139 09/09/2021    K 4 0 09/09/2021     09/09/2021    CO2 28 09/09/2021    BUN 13 09/09/2021    CREATININE 0 41 (L) 09/09/2021    CALCIUM 9 1 09/09/2021    EGFR 133 09/09/2021     VTE Pharmacologic Prophylaxis: Heparin  VTE Mechanical Prophylaxis: sequential compression device

## 2021-09-09 NOTE — PLAN OF CARE
Care plan reviewed and followed  Problem: Prexisting or High Potential for Compromised Skin Integrity  Goal: Skin integrity is maintained or improved  Description: INTERVENTIONS:  - Identify patients at risk for skin breakdown  - Assess and monitor skin integrity  - Assess and monitor nutrition and hydration status  - Monitor labs   - Assess for incontinence   - Turn and reposition patient  - Assist with mobility/ambulation  - Relieve pressure over bony prominences  - Avoid friction and shearing  - Provide appropriate hygiene as needed including keeping skin clean and dry  - Evaluate need for skin moisturizer/barrier cream  - Collaborate with interdisciplinary team   - Patient/family teaching  - Consider wound care consult   Outcome: Progressing     Problem: SKIN/TISSUE INTEGRITY - ADULT  Goal: Incision(s), wounds(s) or drain site(s) healing without S/S of infection  Description: INTERVENTIONS  - Assess and document dressing, incision, wound bed, drain sites and surrounding tissue  - Provide patient and family education  - Perform skin care/dressing changes every shift  Outcome: Progressing  Goal: Pressure injury heals and does not worsen  Description: Interventions:  - Implement low air loss mattress or specialty surface (Criteria met)  - Apply silicone foam dressing  - Instruct/assist with weight shifting every 30 minutes when in chair   - Limit chair time to 2 hour intervals  - Use special pressure reducing interventions such as waffle cushion when in chair   - Apply fecal or urinary incontinence containment device   - Perform passive or active ROM every shift  - Turn and reposition patient & offload bony prominences every 2 hours   - Utilize friction reducing device or surface for transfers   - Consider consults to  interdisciplinary teams such as   - Use incontinent care products after each incontinent episode    - Consider nutrition services referral as needed  Outcome: Progressing     Problem: PAIN - ADULT  Goal: Verbalizes/displays adequate comfort level or baseline comfort level  Description: Interventions:  - Encourage patient to monitor pain and request assistance  - Assess pain using appropriate pain scale  - Administer analgesics based on type and severity of pain and evaluate response  - Implement non-pharmacological measures as appropriate and evaluate response  - Consider cultural and social influences on pain and pain management  - Notify physician/advanced practitioner if interventions unsuccessful or patient reports new pain  Outcome: Progressing     Problem: INFECTION - ADULT  Goal: Absence or prevention of progression during hospitalization  Description: INTERVENTIONS:  - Assess and monitor for signs and symptoms of infection  - Monitor lab/diagnostic results  - Monitor all insertion sites, i e  indwelling lines, tubes, and drains  - Monitor endotracheal if appropriate and nasal secretions for changes in amount and color  - [place] appropriate cooling/warming therapies per order  - Administer medications as ordered  - Instruct and encourage patient and family to use good hand hygiene technique  - Identify and instruct in appropriate isolation precautions for identified infection/condition  Outcome: Progressing  Goal: Absence of fever/infection during neutropenic period  Description: INTERVENTIONS:  - Monitor WBC    Outcome: Progressing     Problem: Nutrition/Hydration-ADULT  Goal: Nutrient/Hydration intake appropriate for improving, restoring or maintaining nutritional needs  Description: Monitor and assess patient's nutrition/hydration status for malnutrition  Collaborate with interdisciplinary team and initiate plan and interventions as ordered  Monitor patient's weight and dietary intake as ordered or per policy  Utilize nutrition screening tool and intervene as necessary  Determine patient's food preferences and provide high-protein, high-caloric foods as appropriate       INTERVENTIONS:  - Monitor oral intake, urinary output, labs, and treatment plans  - Assess nutrition and hydration status and recommend course of action  - Evaluate amount of meals eaten  - Assist patient with eating if necessary   - Allow adequate time for meals  - Recommend/ encourage appropriate diets, oral nutritional supplements, and vitamin/mineral supplements  - Order, calculate, and assess calorie counts as needed  - Recommend, monitor, and adjust tube feedings and TPN/PPN based on assessed needs  - Assess need for intravenous fluids  - Provide specific nutrition/hydration education as appropriate  - Include patient/family/caregiver in decisions related to nutrition  Outcome: Progressing

## 2021-09-09 NOTE — PROGRESS NOTES
Vancomycin Assessment    Gloriann Krabbe is a 49 y o  male who is currently receiving vancomycin 1750mg iv q8h for MRSA confirmed, skin-soft tissue infection     Relevant clinical data and objective history reviewed:  Creatinine   Date Value Ref Range Status   09/09/2021 0 41 (L) 0 70 - 1 30 mg/dL Final     Comment:     Standardized to IDMS reference method   09/08/2021 0 44 (L) 0 70 - 1 30 mg/dL Final     Comment:     Standardized to IDMS reference method   09/07/2021 0 50 (L) 0 70 - 1 30 mg/dL Final     Comment:     Standardized to IDMS reference method     /73 (BP Location: Left arm)   Pulse 80   Temp (!) 96 7 °F (35 9 °C) (Tympanic)   Resp 18   Ht 6' (1 829 m)   Wt 74 4 kg (164 lb)   SpO2 99%   BMI 22 24 kg/m²   I/O last 3 completed shifts: In: 2070 [P O :1320; IV Piggyback:750]  Out: 2600 [Urine:2600]  Lab Results   Component Value Date/Time    BUN 13 09/09/2021 06:06 AM    WBC 10 30 09/09/2021 06:06 AM    HGB 10 8 (L) 09/09/2021 06:06 AM    HCT 32 2 (L) 09/09/2021 06:06 AM    MCV 97 09/09/2021 06:06 AM     09/09/2021 06:06 AM     Temp Readings from Last 3 Encounters:   09/09/21 (!) 96 7 °F (35 9 °C) (Tympanic)   05/18/21 97 9 °F (36 6 °C) (Tympanic)   05/18/21 (!) 96 9 °F (36 1 °C)     Vancomycin Days of Therapy: 5    Assessment/Plan  The patient is currently on vancomycin utilizing scheduled dosing  Baseline risks associated with therapy include:  none   The patient is receiving 1750mg iv q8h with the most recent vancomycin level being at steady-state and therapeutic based on a goal of 15-20 (appropriate for most indications) ; therefore, is clinically appropriate and dose will be continued   Pharmacy will continue to follow closely for s/sx of nephrotoxicity, infusion reactions, and appropriateness of therapy  BMP and CBC will be ordered per protocol  Plan for trough as patient approaches steady state, prior to the 4th  dose at approximately 1630 on 9/10/21   Pharmacy will continue to follow the patients culture results and clinical progress daily      Smita Larson, Pharmacist

## 2021-09-10 LAB
ANION GAP SERPL CALCULATED.3IONS-SCNC: 6 MMOL/L (ref 4–13)
BASOPHILS # BLD AUTO: 0.09 THOUSAND/UL (ref 0–0.1)
BASOPHILS NFR MAR MANUAL: 1 % (ref 0–1)
BLASTS NFR BLD MANUAL: 3 %
BUN SERPL-MCNC: 13 MG/DL (ref 7–25)
CALCIUM SERPL-MCNC: 9.2 MG/DL (ref 8.6–10.5)
CHLORIDE SERPL-SCNC: 104 MMOL/L (ref 98–107)
CO2 SERPL-SCNC: 29 MMOL/L (ref 21–31)
CREAT SERPL-MCNC: 0.44 MG/DL (ref 0.7–1.3)
EOSINOPHIL # BLD AUTO: 0.09 THOUSAND/UL (ref 0–0.61)
EOSINOPHIL NFR BLD MANUAL: 1 % (ref 0–6)
ERYTHROCYTE [DISTWIDTH] IN BLOOD BY AUTOMATED COUNT: 13.5 % (ref 11.5–14.5)
GFR SERPL CREATININE-BSD FRML MDRD: 129 ML/MIN/1.73SQ M
GLUCOSE SERPL-MCNC: 111 MG/DL (ref 65–99)
GLUCOSE SERPL-MCNC: 151 MG/DL (ref 65–140)
GLUCOSE SERPL-MCNC: 157 MG/DL (ref 65–140)
GLUCOSE SERPL-MCNC: 199 MG/DL (ref 65–140)
GLUCOSE SERPL-MCNC: 202 MG/DL (ref 65–140)
HCT VFR BLD AUTO: 32.5 % (ref 42–47)
HGB BLD-MCNC: 11 G/DL (ref 14–18)
LYMPHOCYTES # BLD AUTO: 3.78 THOUSAND/UL (ref 0.6–4.47)
LYMPHOCYTES # BLD AUTO: 42 % (ref 20–51)
MAGNESIUM SERPL-MCNC: 1.5 MG/DL (ref 1.9–2.7)
MCH RBC QN AUTO: 32.4 PG (ref 26–34)
MCHC RBC AUTO-ENTMCNC: 33.9 G/DL (ref 31–37)
MCV RBC AUTO: 96 FL (ref 81–99)
METAMYELOCYTES NFR BLD MANUAL: 1 % (ref 0–1)
MONOCYTES # BLD AUTO: 0.99 THOUSAND/UL (ref 0–1.22)
MONOCYTES NFR BLD AUTO: 11 % (ref 4–12)
NEUTS BAND NFR BLD MANUAL: 7 % (ref 0–8)
NEUTS SEG # BLD: 3.69 THOUSAND/UL (ref 1.81–6.82)
NEUTS SEG NFR BLD AUTO: 34 % (ref 42–75)
PLATELET # BLD AUTO: 373 THOUSANDS/UL (ref 149–390)
PLATELET BLD QL SMEAR: ADEQUATE
PMV BLD AUTO: 8.6 FL (ref 8.6–11.7)
POTASSIUM SERPL-SCNC: 4.2 MMOL/L (ref 3.5–5.5)
PROCALCITONIN SERPL-MCNC: 0.13 NG/ML
RBC # BLD AUTO: 3.4 MILLION/UL (ref 4.3–5.9)
RBC MORPH BLD: NORMAL
SODIUM SERPL-SCNC: 139 MMOL/L (ref 134–143)
TOTAL CELLS COUNTED SPEC: 100
VANCOMYCIN TROUGH SERPL-MCNC: 24.8 UG/ML (ref 5–12)
WBC # BLD AUTO: 9 THOUSAND/UL (ref 4.8–10.8)

## 2021-09-10 PROCEDURE — 99024 POSTOP FOLLOW-UP VISIT: CPT | Performed by: SURGERY

## 2021-09-10 PROCEDURE — 85007 BL SMEAR W/DIFF WBC COUNT: CPT | Performed by: INTERNAL MEDICINE

## 2021-09-10 PROCEDURE — 80202 ASSAY OF VANCOMYCIN: CPT | Performed by: PHYSICIAN ASSISTANT

## 2021-09-10 PROCEDURE — 85027 COMPLETE CBC AUTOMATED: CPT | Performed by: INTERNAL MEDICINE

## 2021-09-10 PROCEDURE — 84145 PROCALCITONIN (PCT): CPT | Performed by: INTERNAL MEDICINE

## 2021-09-10 PROCEDURE — 80048 BASIC METABOLIC PNL TOTAL CA: CPT | Performed by: INTERNAL MEDICINE

## 2021-09-10 PROCEDURE — 99232 SBSQ HOSP IP/OBS MODERATE 35: CPT | Performed by: INTERNAL MEDICINE

## 2021-09-10 PROCEDURE — 83735 ASSAY OF MAGNESIUM: CPT | Performed by: INTERNAL MEDICINE

## 2021-09-10 PROCEDURE — 82948 REAGENT STRIP/BLOOD GLUCOSE: CPT

## 2021-09-10 RX ORDER — CEFDINIR 300 MG/1
300 CAPSULE ORAL EVERY 12 HOURS SCHEDULED
Status: DISCONTINUED | OUTPATIENT
Start: 2021-09-12 | End: 2021-09-13

## 2021-09-10 RX ORDER — MAGNESIUM SULFATE HEPTAHYDRATE 40 MG/ML
2 INJECTION, SOLUTION INTRAVENOUS ONCE
Status: COMPLETED | OUTPATIENT
Start: 2021-09-10 | End: 2021-09-10

## 2021-09-10 RX ADMIN — GABAPENTIN 100 MG: 100 CAPSULE ORAL at 23:07

## 2021-09-10 RX ADMIN — CYANOCOBALAMIN TAB 500 MCG 1000 MCG: 500 TAB at 09:39

## 2021-09-10 RX ADMIN — HEPARIN SODIUM 5000 UNITS: 5000 INJECTION INTRAVENOUS; SUBCUTANEOUS at 05:26

## 2021-09-10 RX ADMIN — VANCOMYCIN HYDROCHLORIDE 1750 MG: 500 INJECTION, POWDER, LYOPHILIZED, FOR SOLUTION INTRAVENOUS at 17:40

## 2021-09-10 RX ADMIN — Medication 1000 UNITS: at 09:38

## 2021-09-10 RX ADMIN — LEVETIRACETAM 1500 MG: 500 TABLET, FILM COATED ORAL at 17:41

## 2021-09-10 RX ADMIN — LACOSAMIDE 200 MG: 150 TABLET, FILM COATED ORAL at 09:38

## 2021-09-10 RX ADMIN — LEVETIRACETAM 1500 MG: 500 TABLET, FILM COATED ORAL at 09:38

## 2021-09-10 RX ADMIN — INSULIN LISPRO 2 UNITS: 100 INJECTION, SOLUTION INTRAVENOUS; SUBCUTANEOUS at 15:59

## 2021-09-10 RX ADMIN — VANCOMYCIN HYDROCHLORIDE 1750 MG: 500 INJECTION, POWDER, LYOPHILIZED, FOR SOLUTION INTRAVENOUS at 00:44

## 2021-09-10 RX ADMIN — HEPARIN SODIUM 5000 UNITS: 5000 INJECTION INTRAVENOUS; SUBCUTANEOUS at 15:01

## 2021-09-10 RX ADMIN — CALCIUM 1 TABLET: 500 TABLET ORAL at 12:18

## 2021-09-10 RX ADMIN — VANCOMYCIN HYDROCHLORIDE 1750 MG: 500 INJECTION, POWDER, LYOPHILIZED, FOR SOLUTION INTRAVENOUS at 09:36

## 2021-09-10 RX ADMIN — HEPARIN SODIUM 5000 UNITS: 5000 INJECTION INTRAVENOUS; SUBCUTANEOUS at 22:58

## 2021-09-10 RX ADMIN — PRAVASTATIN SODIUM 80 MG: 40 TABLET ORAL at 17:41

## 2021-09-10 RX ADMIN — MAGNESIUM SULFATE IN WATER 2 G: 40 INJECTION, SOLUTION INTRAVENOUS at 16:40

## 2021-09-10 RX ADMIN — BENZTROPINE MESYLATE 0.5 MG: 0.5 TABLET ORAL at 09:39

## 2021-09-10 RX ADMIN — LACOSAMIDE 200 MG: 150 TABLET, FILM COATED ORAL at 23:07

## 2021-09-10 RX ADMIN — QUETIAPINE FUMARATE 400 MG: 50 TABLET, EXTENDED RELEASE ORAL at 22:58

## 2021-09-10 RX ADMIN — LEVOTHYROXINE SODIUM 125 MCG: 25 TABLET ORAL at 05:26

## 2021-09-10 RX ADMIN — QUETIAPINE FUMARATE 200 MG: 50 TABLET, EXTENDED RELEASE ORAL at 15:00

## 2021-09-10 RX ADMIN — BENZTROPINE MESYLATE 0.5 MG: 0.5 TABLET ORAL at 17:41

## 2021-09-10 RX ADMIN — INSULIN LISPRO 2 UNITS: 100 INJECTION, SOLUTION INTRAVENOUS; SUBCUTANEOUS at 12:18

## 2021-09-10 RX ADMIN — Medication 2000 MG: at 15:00

## 2021-09-10 RX ADMIN — HYDROXYZINE HYDROCHLORIDE 50 MG: 25 TABLET ORAL at 15:00

## 2021-09-10 RX ADMIN — GABAPENTIN 100 MG: 100 CAPSULE ORAL at 15:00

## 2021-09-10 RX ADMIN — INSULIN LISPRO 4 UNITS: 100 INJECTION, SOLUTION INTRAVENOUS; SUBCUTANEOUS at 07:27

## 2021-09-10 RX ADMIN — HYDROXYZINE HYDROCHLORIDE 50 MG: 25 TABLET ORAL at 23:07

## 2021-09-10 RX ADMIN — INSULIN LISPRO 1 UNITS: 100 INJECTION, SOLUTION INTRAVENOUS; SUBCUTANEOUS at 23:03

## 2021-09-10 RX ADMIN — LORATADINE 10 MG: 10 TABLET ORAL at 09:39

## 2021-09-10 RX ADMIN — GABAPENTIN 100 MG: 100 CAPSULE ORAL at 09:39

## 2021-09-10 RX ADMIN — CALCIUM 1 TABLET: 500 TABLET ORAL at 17:41

## 2021-09-10 RX ADMIN — DIVALPROEX SODIUM 1000 MG: 500 TABLET, EXTENDED RELEASE ORAL at 22:58

## 2021-09-10 RX ADMIN — Medication 2000 MG: at 05:32

## 2021-09-10 RX ADMIN — CALCIUM 1 TABLET: 500 TABLET ORAL at 06:36

## 2021-09-10 RX ADMIN — DIVALPROEX SODIUM 500 MG: 500 TABLET, EXTENDED RELEASE ORAL at 09:39

## 2021-09-10 RX ADMIN — LISINOPRIL 10 MG: 10 TABLET ORAL at 09:39

## 2021-09-10 RX ADMIN — TEMAZEPAM 15 MG: 15 CAPSULE ORAL at 22:58

## 2021-09-10 RX ADMIN — QUETIAPINE FUMARATE 200 MG: 50 TABLET, EXTENDED RELEASE ORAL at 09:39

## 2021-09-10 NOTE — QUICK NOTE
INF DIS PLAN OF CARE NOTE    Patient is afebrile without leukocytosis  Underwent additional wound debridement yesterday  Bld cx (9/5) 1/2 MRSA  Bld cx (9/8) NG  TTE no vegetation  Wound cx MRSA, E coli  Tissue cx MRSA    A Uncomplicated MRSA bacteremia, infected decubitus ulcer with gluteal abscess  R    · Continue iv Vancomycin, dosing per pharmacy protocol, goal trough 15-20  · Discontinue aztreonam and switch to cefdinir 300 bid through 9/12 (no significant risk of cross reactions with listed penicillin allergy)  · If repeat blood cultures are negative for 48 hrs can place PICC  · Anticipate 2 weeks of iv antibiotics via PICC through 9/22  · Patient will need weekly labs - CBC/diff, CMP, Vanc trough while on iv antibitics  · PICC can be removed after completing iv antibiotics  · Continue wound care, monitoring wound for need for additional debridement/wound vac    Reviewed case with primary team  Will sign off  Please call with concerns or any changes in clinical status or new test results

## 2021-09-10 NOTE — PROGRESS NOTES
Vancomycin Assessment    Michael Daigle is a 49 y o  male who is currently receiving vancomycin 1750mg IV Q8H for skin-soft tissue infection, MRSA confirmed     Relevant clinical data and objective history reviewed:  Creatinine   Date Value Ref Range Status   09/10/2021 0 44 (L) 0 70 - 1 30 mg/dL Final     Comment:     Standardized to IDMS reference method   09/09/2021 0 41 (L) 0 70 - 1 30 mg/dL Final     Comment:     Standardized to IDMS reference method   09/08/2021 0 44 (L) 0 70 - 1 30 mg/dL Final     Comment:     Standardized to IDMS reference method     /76 (BP Location: Left arm)   Pulse 87   Temp 97 8 °F (36 6 °C) (Temporal)   Resp 18   Ht 6' (1 829 m)   Wt 74 4 kg (164 lb)   SpO2 99%   BMI 22 24 kg/m²   I/O last 3 completed shifts: In: 2079 [P O :1940; IV Piggyback:1100]  Out: 1500 [Urine:1500]  Lab Results   Component Value Date/Time    BUN 13 09/10/2021 06:05 AM    WBC 9 00 09/10/2021 06:05 AM    HGB 11 0 (L) 09/10/2021 06:05 AM    HCT 32 5 (L) 09/10/2021 06:05 AM    MCV 96 09/10/2021 06:05 AM     09/10/2021 06:05 AM     Temp Readings from Last 3 Encounters:   09/10/21 97 8 °F (36 6 °C) (Temporal)   05/18/21 97 9 °F (36 6 °C) (Tympanic)   05/18/21 (!) 96 9 °F (36 1 °C)     Vancomycin Days of Therapy: 6    Assessment/Plan  The patient is currently on vancomycin utilizing scheduled dosing  Baseline risks associated with therapy include:  none   The patient is receiving 1750mg IV Q8H with the most recent vancomycin level being at steady-state and supratherapeutic based on a goal of 15-20 (appropriate for most indications) ; therefore, after clinical evaluation will be changed to 1250mg IV Q8H   Pharmacy will continue to follow closely for s/sx of nephrotoxicity, infusion reactions, and appropriateness of therapy  BMP and CBC will be ordered per protocol  Plan for trough as patient approaches steady state, prior to the 4th  dose at approximately 0100 on 09/12/2021   Pharmacy will continue to follow the patients culture results and clinical progress daily      Susan Quinn, Pharmacist

## 2021-09-10 NOTE — CASE MANAGEMENT
Case Management ED Progress Note    Patient name Santino Lopez  Location /-36 MRN [id number]  : 1965 Date 9/10/2021       Previous Admission - Discharge Date:21   Previous Discharge Diagnosis:  There are no discharge diagnoses documented for the most recent discharge  BUNDLE:      OBJECTIVE:  Pt is a 49y o  year old Single, white or  [1], male with Oriental orthodox preference of None who presented to the ER at [address] [place] [address] on 2021  5:40 PM with complaints of Sepsis ([facility] [address] )   Patient Class: Inpatient  Preferred Pharmacy:   [address] [place] [id number] [facility]  [address] [place] [id number]  Phone: [phone] Fax: [phone]    Primary Care Provider: Jessica Myles PA-C    Primary Insurance: MEDICARE  Secondary Insurance: [place] [employer]    ED Progress Note:  Faxed all clinical to [facility] for the wound vac  Pt will be getting a PICC line on Monday  Will need 2 weeks of IVABX  Seth STEWARD will start services with the pt upon discharge

## 2021-09-10 NOTE — PLAN OF CARE
Care plan reviewed and followed  Problem: Prexisting or High Potential for Compromised Skin Integrity  Goal: Skin integrity is maintained or improved  Description: INTERVENTIONS:  - Identify patients at risk for skin breakdown  - Assess and monitor skin integrity  - Assess and monitor nutrition and hydration status  - Monitor labs   - Assess for incontinence   - Turn and reposition patient  - Assist with mobility/ambulation  - Relieve pressure over bony prominences  - Avoid friction and shearing  - Provide appropriate hygiene as needed including keeping skin clean and dry  - Evaluate need for skin moisturizer/barrier cream  - Collaborate with interdisciplinary team   - Patient/family teaching  - Consider wound care consult   Outcome: Progressing     Problem: SKIN/TISSUE INTEGRITY - ADULT  Goal: Incision(s), wounds(s) or drain site(s) healing without S/S of infection  Description: INTERVENTIONS  - Assess and document dressing, incision, wound bed, drain sites and surrounding tissue  - Provide patient and family education  - Perform skin care/dressing changes every shift  Outcome: Progressing  Goal: Pressure injury heals and does not worsen  Description: Interventions:  - Implement low air loss mattress or specialty surface (Criteria met)  - Apply silicone foam dressing  - Instruct/assist with weight shifting every 30 minutes when in chair   - Limit chair time to 2 hour intervals  - Use special pressure reducing interventions such as waffle cushion when in chair   - Apply fecal or urinary incontinence containment device   - Perform passive or active ROM every shift  - Turn and reposition patient & offload bony prominences every 2 hours   - Utilize friction reducing device or surface for transfers   - Consider consults to  interdisciplinary teams such as   - Use incontinent care products after each incontinent episode    - Consider nutrition services referral as needed  Outcome: Progressing     Problem: PAIN - Monitor oral intake, urinary output, labs, and treatment plans  - Assess nutrition and hydration status and recommend course of action  - Evaluate amount of meals eaten  - Assist patient with eating if necessary   - Allow adequate time for meals  - Recommend/ encourage appropriate diets, oral nutritional supplements, and vitamin/mineral supplements  - Order, calculate, and assess calorie counts as needed  - Recommend, monitor, and adjust tube feedings and TPN/PPN based on assessed needs  - Assess need for intravenous fluids  - Provide specific nutrition/hydration education as appropriate  - Include patient/family/caregiver in decisions related to nutrition  Outcome: Progressing

## 2021-09-10 NOTE — PROGRESS NOTES
Progress Note - General Surgery   Gloriann Krabbe 49 y o  male MRN: [id number]  Unit/Bed#: -01 Encounter: [id number]    Assessment/Plan      56M with cerebral palsy, now admitted with MRSA bacteremia secondary to a large left gluteal abscess secondary to a severe pressure wound  Status post bedside incision and drainage and ongoing daily debridement of necrotic tissue and wound care, WBC normalized and patient afebrile and clinically stable  Continues on vancomycin and aztreonam with plan for PICC placement and discharge on IV antibiotic therapy  Per ID note, he will be transitioned to IV vancomycin and cefdinir today  Dressing changed at bedside, cavity probed and cleansed aggressively, no undrained purulence encountered today  Skin edges debrided circumferentially, still with some residual necrotic tissue, will continue daily wound care  Additional skin and soft tissue has yet to declare itself in terms of survivability  May require additional bedside debridement  Would continue aggressive wound care BID and as needed as per the orders and will continue to monitor the wound and surrounding cellulitis  Will benefit from wound VAC after the wound is a bit  but we are not ready for that yet, likely Monday  Will continue to follow        Subjective/Objective     Subjective: no acute events, afebrile, wound aggressive debrided yesterday with additional purulent drainage    Objective:     Blood pressure 162/82, pulse 74, temperature (!) 96 1 °F (35 6 °C), temperature source Temporal, resp  rate 18, height 6' (1 829 m), weight 74 4 kg (164 lb), SpO2 96 %  ,Body mass index is 22 24 kg/m²        Intake/Output Summary (Last 24 hours) at 9/10/2021 1047  Last data filed at 9/10/2021 0606  Gross per 24 hour   Intake 1820 ml   Output 300 ml   Net 1520 ml       Invasive Devices     Peripheral Intravenous Line            Peripheral IV 09/08/21 Right;Ventral (anterior) Forearm 1 day                Physical Exam  Vitals reviewed  Constitutional:       General: He is not in acute distress  HENT:      Mouth/Throat:      Mouth: Mucous membranes are moist    Cardiovascular:      Rate and Rhythm: Normal rate  Pulmonary:      Effort: Pulmonary effort is normal  No respiratory distress  Skin:     Comments: Left buttock wound approximately 6cm x 6cm, 2 5cm deep, clean healthy wound base, no expressible purulence today from lateral side after yesterday's debridement and drainage, wound edges are necrotic circumferentially and some sharp and mechanical debridement was performed again today using scissors and qtips and gauze, surrounding skin much improved, no cellulitis   Neurological:      Mental Status: He is alert  Lab, Imaging and other studies:  I have personally reviewed pertinent lab results    , CBC:   Lab Results   Component Value Date    WBC 9 00 09/10/2021    HGB 11 0 (L) 09/10/2021    HCT 32 5 (L) 09/10/2021    MCV 96 09/10/2021     09/10/2021    MCH 32 4 09/10/2021    MCHC 33 9 09/10/2021    RDW 13 5 09/10/2021    MPV 8 6 09/10/2021   , CMP:   Lab Results   Component Value Date    SODIUM 139 09/10/2021    K 4 2 09/10/2021     09/10/2021    CO2 29 09/10/2021    BUN 13 09/10/2021    CREATININE 0 44 (L) 09/10/2021    CALCIUM 9 2 09/10/2021    EGFR 129 09/10/2021     VTE Pharmacologic Prophylaxis: Heparin  VTE Mechanical Prophylaxis: sequential compression device

## 2021-09-10 NOTE — ASSESSMENT & PLAN NOTE
· Secondary to left gluteal abscess, febrile and tachycardic on admission  · 1/2 sets of blood cultures positive for MRSA  · Patient status post bedside debridement on 09/06/2021 by surgical team   Continue wound management with surgical team  · Will continue IV vancomycin  · Aztreonam discontinued and patient transitioned to cefdinir per ID recommendation  · IV fluids as needed  · Will trend procalcitonin  · Cultures growing MRSA  · Repeat blood cultures with no growth for 48 hours  · Echo with no obvious vegetations  · Surgical team following

## 2021-09-10 NOTE — PROGRESS NOTES
[address] [facility]  Progress Note Jessica Varghese 1965, 49 y o  male MRN: [id number]  Unit/Bed#: -01 Encounter: [id number]  Primary Care Provider: Adebayo Jaramillo PA-C   Date and time admitted to hospital: 9/5/2021  5:40 PM    * Sepsis [facility])  Assessment & Plan  · Secondary to left gluteal abscess, febrile and tachycardic on admission  · 1/2 sets of blood cultures positive for MRSA  · Patient status post bedside debridement on 09/06/2021 by surgical team   Continue wound management with surgical team  · Will continue IV vancomycin  · Aztreonam discontinued and patient transitioned to cefdinir per ID recommendation  · IV fluids as needed  · Will trend procalcitonin  · Cultures growing MRSA  · Repeat blood cultures with no growth for 48 hours  · Echo with no obvious vegetations  · Surgical team following    Bacteremia due to Gram-positive bacteria  Assessment & Plan  · Blood cultures 1 of 2 positive for MRSA  · Continue antibiotics as above    Gluteal abscess  Assessment & Plan  · Surgical team following  Will continue treatment as above    Hyponatremia  Assessment & Plan  · Likely secondary to hypovolemia, improved with IV fluids    Metabolic encephalopathy  Assessment & Plan  · Likely secondary to sepsis/bacteremia  · Mental status appears to be back at baseline  · Will continue supportive care    Essential hypertension  Assessment & Plan  BP stable  Continue lisinopril    Generalized convulsive epilepsy ([facility] [address] )  Assessment & Plan  Continue pre-hospital Keppra 1500 mg p o  B i d , Depakote  mg p o  Daily and 1000 mg p o  Q h s , Klonopin 0 25 mg p o  B i d  and Vimpat 200 mg p o  B i d  Type 2 diabetes mellitus without complication, without long-term current use of insulin ([facility])  Assessment & Plan  · Diabetic diet  · Insulin sliding scale      VTE Prophylaxis:  Heparin    Patient Centered Rounds: I have performed bedside rounds with nursing staff today      Discussions with Specialists or Other Care Team Provider: yes  Education and Discussions with Family / Patient:  Spoke with sister at bedside    Current Length of Stay: 5 day(s)    Current Patient Status: Inpatient   Certification Statement: The patient will continue to require additional inpatient hospital stay due to Sepsis    Discharge Plan:  Pending hospital course    Code Status: Level 1 - Full Code    Subjective:   No overnight events noted  Tolerating diet    Objective:     Vitals:   Temp (24hrs), Av 2 °F (35 7 °C), Min:94 8 °F (34 9 °C), Max:97 8 °F (36 6 °C)    Temp:  [94 8 °F (34 9 °C)-97 8 °F (36 6 °C)] 97 8 °F (36 6 °C)  HR:  [68-87] 87  Resp:  [18] 18  BP: (130-162)/(76-82) 146/76  SpO2:  [96 %-99 %] 99 %  Body mass index is 22 24 kg/m²  Input and Output Summary (last 24 hours): Intake/Output Summary (Last 24 hours) at 9/10/2021 1617  Last data filed at 9/10/2021 0606  Gross per 24 hour   Intake 1360 ml   Output 300 ml   Net 1060 ml       Physical Exam:   Physical Exam  Constitutional:       General: He is not in acute distress  HENT:      Head: Normocephalic and atraumatic  Nose: Nose normal       Mouth/Throat:      Mouth: Mucous membranes are moist    Eyes:      Extraocular Movements: Extraocular movements intact  Conjunctiva/sclera: Conjunctivae normal    Cardiovascular:      Rate and Rhythm: Normal rate and regular rhythm  Pulmonary:      Effort: Pulmonary effort is normal  No respiratory distress  Abdominal:      Palpations: Abdomen is soft  Tenderness: There is no abdominal tenderness  Musculoskeletal:         General: Normal range of motion  Cervical back: Normal range of motion and neck supple  Skin:     General: Skin is warm and dry  Comments: Left gluteal skin wound with dressing in place   Neurological:      General: No focal deficit present  Mental Status: He is alert  Mental status is at baseline  Cranial Nerves: No cranial nerve deficit  Psychiatric:         Mood and Affect: Mood normal          Behavior: Behavior normal          Additional Data:     Labs:    Results from last 7 days   Lab Units 09/10/21  0605   WBC Thousand/uL 9 00   HEMOGLOBIN g/dL 11 0*   HEMATOCRIT % 32 5*   PLATELETS Thousands/uL 373   LYMPHO PCT % 42   MONO PCT % 11   EOS PCT % 1     Results from last 7 days   Lab Units 09/10/21  0605 09/05/21  1906   SODIUM mmol/L 139 132*   POTASSIUM mmol/L 4 2 3 9   CHLORIDE mmol/L 104 92*   CO2 mmol/L 29 27   BUN mg/dL 13 13   CREATININE mg/dL 0 44* 0 67*   CALCIUM mg/dL 9 2 8 5*   ALK PHOS U/L  --  50   ALT U/L  --  30   AST U/L  --  36         Results from last 7 days   Lab Units 09/10/21  1551 09/10/21  1031 09/10/21  0639 09/09/21  2114 09/09/21  1553 09/09/21  1053 09/09/21  0612 09/08/21  2129 09/08/21  1540 09/08/21  1055 09/08/21  0636 09/07/21  2127   POC GLUCOSE mg/dl 151* 157* 202* 108 132 137 115 131 187* 170* 93 122           * I Have Reviewed All Lab Data Listed Above  * Additional Pertinent Lab Tests Reviewed: Daisy [address] Admission  Reviewed    Imaging:  Imaging Reports Reviewed Today Include:  No new imaging    Recent Cultures (last 7 days):     Results from last 7 days   Lab Units 09/08/21  0516 09/06/21  0927 09/05/21 2015 09/05/21  1906   BLOOD CULTURE  No Growth at 48 hrs  No Growth at 48 hrs   --   --  No Growth After 4 Days    Methicillin Resistant Staphylococcus aureus*   GRAM STAIN RESULT   --  2+ Polys*  3+ Gram positive cocci in pairs, chains and clusters* 3+ Gram positive cocci in pairs, chains and clusters*  No polys seen* Gram positive cocci in clusters*   WOUND CULTURE   --  3+ Growth of Methicillin Resistant Staphylococcus aureus* 4+ Growth of Methicillin Resistant Staphylococcus aureus*  1+ Growth of Escherichia coli*  --        Last 24 Hours Medication List:   Current Facility-Administered Medications   Medication Dose Route Frequency Provider Last Rate    benzonatate  200 mg Oral TID PRN Linda Reynoso PA-C      benztropine  0 5 mg Oral BID Linda Reynoso PA-C      calcium carbonate  1 tablet Oral TID With Meals Linda Reynoso PA-C      [START ON 9/12/2021] cefdinir  300 mg Oral Q12H Chinedu Wolf MD      cholecalciferol  1,000 Units Oral Daily Linda Reynoso PA-C      clonazePAM  0 25 mg Oral BID PRN Linda Reynoso PA-C      vitamin B-12  1,000 mcg Oral Daily Izaiaheen JAIR Reynoso      divalproex sodium  1,000 mg Oral HS Wileen STERLING Reynoso-C      divalproex sodium  500 mg Oral Daily Linda Reynoso PA-C      gabapentin  100 mg Oral TID Linda Reynoso PA-C      heparin (porcine)  5,000 Units Subcutaneous [facility] Ginny Meyer MD      hydrOXYzine HCL  50 mg Oral TID Linda Reynoso PA-C      insulin lispro  1-5 Units Subcutaneous HS [facility]      insulin lispro  2-12 Units Subcutaneous TID [facility] JA[facility]      lacosamide  200 mg Oral Q12H [address] Linda Reynoso PA-C      levETIRAcetam  1,500 mg Oral BID Linda Reynoso PA-C      levothyroxine  125 mcg Oral Early Morning Linda Reynoso PA-C      lisinopril  10 mg Oral Daily Linda Reynoso PA-C      loratadine  10 mg Oral Daily Linda Reynoso PA-C      magnesium sulfate  2 g Intravenous Once Ginny Meyer MD      polyethylene glycol  17 g Oral Daily Linda Reynoso PA-C      pravastatin  80 mg Oral Daily With MedefyJAIR      QUEtiapine  200 mg Oral BID (AM & Afternoon) Linda Reynoso PA-C      QUEtiapine  400 mg Oral HS [place]      temazepam  15 mg Oral HS [facility] JA[facility] Reynoso      vancomycin  1,750 mg Intravenous Q8H MORENA MendozaC 1,750 mg (09/10/21 0936)        Today, Patient Was Seen By: Ginny Meyer MD    ** Please Note: Dictation voice to text software may have been used in the creation of this document   **

## 2021-09-11 LAB
BACTERIA BLD CULT: NORMAL
GLUCOSE SERPL-MCNC: 138 MG/DL (ref 65–140)
GLUCOSE SERPL-MCNC: 141 MG/DL (ref 65–140)
GLUCOSE SERPL-MCNC: 142 MG/DL (ref 65–140)
GLUCOSE SERPL-MCNC: 93 MG/DL (ref 65–140)
PROCALCITONIN SERPL-MCNC: 0.11 NG/ML

## 2021-09-11 PROCEDURE — 82948 REAGENT STRIP/BLOOD GLUCOSE: CPT

## 2021-09-11 PROCEDURE — 99232 SBSQ HOSP IP/OBS MODERATE 35: CPT | Performed by: INTERNAL MEDICINE

## 2021-09-11 PROCEDURE — 84145 PROCALCITONIN (PCT): CPT | Performed by: INTERNAL MEDICINE

## 2021-09-11 PROCEDURE — 99024 POSTOP FOLLOW-UP VISIT: CPT | Performed by: PHYSICIAN ASSISTANT

## 2021-09-11 RX ORDER — MAGNESIUM SULFATE HEPTAHYDRATE 40 MG/ML
2 INJECTION, SOLUTION INTRAVENOUS ONCE
Status: COMPLETED | OUTPATIENT
Start: 2021-09-11 | End: 2021-09-11

## 2021-09-11 RX ADMIN — HEPARIN SODIUM 5000 UNITS: 5000 INJECTION INTRAVENOUS; SUBCUTANEOUS at 13:16

## 2021-09-11 RX ADMIN — CALCIUM 1 TABLET: 500 TABLET ORAL at 09:44

## 2021-09-11 RX ADMIN — HYDROXYZINE HYDROCHLORIDE 50 MG: 25 TABLET ORAL at 23:01

## 2021-09-11 RX ADMIN — GABAPENTIN 100 MG: 100 CAPSULE ORAL at 23:00

## 2021-09-11 RX ADMIN — PRAVASTATIN SODIUM 80 MG: 40 TABLET ORAL at 16:45

## 2021-09-11 RX ADMIN — MAGNESIUM SULFATE HEPTAHYDRATE 2 G: 40 INJECTION, SOLUTION INTRAVENOUS at 17:14

## 2021-09-11 RX ADMIN — LEVETIRACETAM 1500 MG: 500 TABLET, FILM COATED ORAL at 09:42

## 2021-09-11 RX ADMIN — GABAPENTIN 100 MG: 100 CAPSULE ORAL at 09:42

## 2021-09-11 RX ADMIN — HYDROXYZINE HYDROCHLORIDE 50 MG: 25 TABLET ORAL at 16:46

## 2021-09-11 RX ADMIN — LORATADINE 10 MG: 10 TABLET ORAL at 09:43

## 2021-09-11 RX ADMIN — LEVETIRACETAM 1500 MG: 500 TABLET, FILM COATED ORAL at 17:42

## 2021-09-11 RX ADMIN — CYANOCOBALAMIN TAB 500 MCG 1000 MCG: 500 TAB at 09:44

## 2021-09-11 RX ADMIN — VANCOMYCIN HYDROCHLORIDE 1250 MG: 1 INJECTION, POWDER, LYOPHILIZED, FOR SOLUTION INTRAVENOUS at 19:29

## 2021-09-11 RX ADMIN — DIVALPROEX SODIUM 1000 MG: 500 TABLET, EXTENDED RELEASE ORAL at 23:00

## 2021-09-11 RX ADMIN — QUETIAPINE FUMARATE 200 MG: 50 TABLET, EXTENDED RELEASE ORAL at 13:17

## 2021-09-11 RX ADMIN — BENZTROPINE MESYLATE 0.5 MG: 0.5 TABLET ORAL at 17:42

## 2021-09-11 RX ADMIN — LACOSAMIDE 200 MG: 150 TABLET, FILM COATED ORAL at 09:44

## 2021-09-11 RX ADMIN — Medication 1000 UNITS: at 09:43

## 2021-09-11 RX ADMIN — CALCIUM 1 TABLET: 500 TABLET ORAL at 16:46

## 2021-09-11 RX ADMIN — CALCIUM 1 TABLET: 500 TABLET ORAL at 12:05

## 2021-09-11 RX ADMIN — TEMAZEPAM 15 MG: 15 CAPSULE ORAL at 23:01

## 2021-09-11 RX ADMIN — LEVOTHYROXINE SODIUM 125 MCG: 25 TABLET ORAL at 05:53

## 2021-09-11 RX ADMIN — BENZTROPINE MESYLATE 0.5 MG: 0.5 TABLET ORAL at 09:43

## 2021-09-11 RX ADMIN — GABAPENTIN 100 MG: 100 CAPSULE ORAL at 16:45

## 2021-09-11 RX ADMIN — HEPARIN SODIUM 5000 UNITS: 5000 INJECTION INTRAVENOUS; SUBCUTANEOUS at 05:52

## 2021-09-11 RX ADMIN — QUETIAPINE FUMARATE 400 MG: 50 TABLET, EXTENDED RELEASE ORAL at 22:59

## 2021-09-11 RX ADMIN — LISINOPRIL 10 MG: 10 TABLET ORAL at 09:43

## 2021-09-11 RX ADMIN — QUETIAPINE FUMARATE 200 MG: 50 TABLET, EXTENDED RELEASE ORAL at 09:44

## 2021-09-11 RX ADMIN — HYDROXYZINE HYDROCHLORIDE 50 MG: 25 TABLET ORAL at 09:44

## 2021-09-11 RX ADMIN — VANCOMYCIN HYDROCHLORIDE 1250 MG: 1 INJECTION, POWDER, LYOPHILIZED, FOR SOLUTION INTRAVENOUS at 01:20

## 2021-09-11 RX ADMIN — LACOSAMIDE 200 MG: 150 TABLET, FILM COATED ORAL at 23:00

## 2021-09-11 RX ADMIN — HEPARIN SODIUM 5000 UNITS: 5000 INJECTION INTRAVENOUS; SUBCUTANEOUS at 22:59

## 2021-09-11 RX ADMIN — VANCOMYCIN HYDROCHLORIDE 1250 MG: 1 INJECTION, POWDER, LYOPHILIZED, FOR SOLUTION INTRAVENOUS at 09:52

## 2021-09-11 RX ADMIN — DIVALPROEX SODIUM 500 MG: 500 TABLET, EXTENDED RELEASE ORAL at 09:43

## 2021-09-11 NOTE — PROGRESS NOTES
[facility]  Progress Note Petty Gallardo 1965, 49 y o  male MRN: [id number]  Unit/Bed#: -02 Encounter: [id number]  Primary Care Provider: Gokul Durham PA-C   Date and time admitted to hospital: 9/5/2021  5:40 PM    * Sepsis [facility])  Assessment & Plan  · Secondary to left gluteal abscess, febrile and tachycardic on admission  · 1/2 sets of blood cultures positive for MRSA  · Patient status post bedside debridement on 09/06/2021 by surgical team   Continue wound management with surgical team  · Will continue IV vancomycin  · Aztreonam discontinued and patient transitioned to cefdinir per ID recommendation  · IV fluids as needed  · Will trend procalcitonin  · Cultures growing MRSA  · Repeat blood cultures with no growth  · Echo with no obvious vegetations  · Surgical team following  · PICC line to be placed on Monday    Bacteremia due to Gram-positive bacteria  Assessment & Plan  · Blood cultures 1 of 2 positive for MRSA  · Continue antibiotics as above    Gluteal abscess  Assessment & Plan  · Surgical team following  Will continue treatment as above    Hyponatremia  Assessment & Plan  · Likely secondary to hypovolemia, improved with IV fluids    Metabolic encephalopathy  Assessment & Plan  · Likely secondary to sepsis/bacteremia  · Mental status appears to be back at baseline  · Will continue supportive care    Essential hypertension  Assessment & Plan  BP stable  Continue lisinopril    Generalized convulsive epilepsy ([facility] [address] )  Assessment & Plan  Continue pre-hospital Keppra 1500 mg p o  B i d , Depakote  mg p o  Daily and 1000 mg p o  Q h s , Klonopin 0 25 mg p o  B i d  and Vimpat 200 mg p o  B i d      Type 2 diabetes mellitus without complication, without long-term current use of insulin ([facility])  Assessment & Plan  · Diabetic diet  · Insulin sliding scale      VTE Prophylaxis:  Heparin    Patient Centered Rounds: I have performed bedside rounds with nursing staff today     Discussions with Specialists or Other Care Team Provider: yes  Education and Discussions with Family / Patient:  Spoke with family at bedside yesterday regarding plan of care    Current Length of Stay: 6 day(s)    Current Patient Status: Inpatient   Certification Statement: The patient will continue to require additional inpatient hospital stay due to Sepsis    Discharge Plan:  Pending hospital course    Code Status: Level 1 - Full Code    Subjective:   No overnight events noted  Patient did have further wound debridement with surgical team yesterday at bedside  Surgical team anticipating wound VAC on Monday    Objective:     Vitals:   Temp (24hrs), Av 6 °F (35 9 °C), Min:96 1 °F (35 6 °C), Max:97 °F (36 1 °C)    Temp:  [96 1 °F (35 6 °C)-97 °F (36 1 °C)] 96 1 °F (35 6 °C)  HR:  [81-95] 95  Resp:  [18] 18  BP: (140-143)/(80-81) 140/80  SpO2:  [96 %-98 %] 96 %  Body mass index is 22 24 kg/m²  Input and Output Summary (last 24 hours): Intake/Output Summary (Last 24 hours) at 2021 1518  Last data filed at 2021 1101  Gross per 24 hour   Intake 890 ml   Output 4600 ml   Net -3710 ml       Physical Exam:   Physical Exam  Constitutional:       General: He is not in acute distress  Comments: Frail  male   HENT:      Head: Normocephalic and atraumatic  Nose: Nose normal       Mouth/Throat:      Mouth: Mucous membranes are moist    Eyes:      Extraocular Movements: Extraocular movements intact  Conjunctiva/sclera: Conjunctivae normal    Cardiovascular:      Rate and Rhythm: Normal rate and regular rhythm  Pulmonary:      Effort: Pulmonary effort is normal  No respiratory distress  Abdominal:      Palpations: Abdomen is soft  Tenderness: There is no abdominal tenderness  Musculoskeletal:         General: Normal range of motion  Cervical back: Normal range of motion and neck supple  Skin:     General: Skin is warm and dry        Comments: Left gluteal wound with dressing in place   Neurological:      General: No focal deficit present  Mental Status: He is alert  Mental status is at baseline  Psychiatric:         Mood and Affect: Mood normal          Behavior: Behavior normal       Comments: Baseline cognitive impairment         Additional Data:     Labs:    Results from last 7 days   Lab Units 09/10/21  0605   WBC Thousand/uL 9 00   HEMOGLOBIN g/dL 11 0*   HEMATOCRIT % 32 5*   PLATELETS Thousands/uL 373   LYMPHO PCT % 42   MONO PCT % 11   EOS PCT % 1     Results from last 7 days   Lab Units 09/10/21  0605 09/05/21  1906   SODIUM mmol/L 139 132*   POTASSIUM mmol/L 4 2 3 9   CHLORIDE mmol/L 104 92*   CO2 mmol/L 29 27   BUN mg/dL 13 13   CREATININE mg/dL 0 44* 0 67*   CALCIUM mg/dL 9 2 8 5*   ALK PHOS U/L  --  50   ALT U/L  --  30   AST U/L  --  36         Results from last 7 days   Lab Units 09/11/21  1131 09/11/21  0548 09/10/21  2054 09/10/21  1551 09/10/21  1031 09/10/21  0639 09/09/21  2114 09/09/21  1553 09/09/21  1053 09/09/21  0612 09/08/21  2129 09/08/21  1540   POC GLUCOSE mg/dl 142* 93 199* 151* 157* 202* 108 132 137 115 131 187*           * I Have Reviewed All Lab Data Listed Above  * Additional Pertinent Lab Tests Reviewed: Daisy 66 Admission  Reviewed    Imaging:  Imaging Reports Reviewed Today Include:  No new imaging    Recent Cultures (last 7 days):     Results from last 7 days   Lab Units 09/08/21  0516 09/06/21  0927 09/05/21 2015 09/05/21  1906   BLOOD CULTURE  No Growth at 72 hrs  No Growth at 72 hrs   --   --  No Growth After 5 Days    Methicillin Resistant Staphylococcus aureus*   GRAM STAIN RESULT   --  2+ Polys*  3+ Gram positive cocci in pairs, chains and clusters* 3+ Gram positive cocci in pairs, chains and clusters*  No polys seen* Gram positive cocci in clusters*   WOUND CULTURE   --  3+ Growth of Methicillin Resistant Staphylococcus aureus* 4+ Growth of Methicillin Resistant Staphylococcus aureus*  1+ Growth of Escherichia coli*  --        Last 24 Hours Medication List:   Current Facility-Administered Medications   Medication Dose Route Frequency Provider Last Rate    benzonatate  200 mg Oral TID PRN Floria Sevin, PA-DENIA      benztropine  0 5 mg Oral BID Floria Sevin, PA-C      calcium carbonate  1 tablet Oral TID With Meals Jose David Valles PA-C      [START ON 9/12/2021] cefdinir  300 mg Oral Q12H Chinedu Wolf MD      cholecalciferol  1,000 Units Oral Daily Floria Sevin, PA-C      clonazePAM  0 25 mg Oral BID PRN Floria Sevin, PA-C      vitamin B-12  1,000 mcg Oral Daily Floria Sevin, PA-C      divalproex sodium  1,000 mg Oral HS Floria Sevin, PA-C      divalproex sodium  500 mg Oral Daily Floria Sevin, PA-C      gabapentin  100 mg Oral TID Floria Sevin, PA-C      heparin (porcine)  5,000 Units Subcutaneous [facility] Richar Garcia MD      hydrOXYzine HCL  50 mg Oral TID Floria Sevin, PA-C      insulin lispro  1-5 Units Subcutaneous HS Emmer Carlos, PA-C      insulin lispro  2-12 Units Subcutaneous TID AC [place] Ballard PA-C      lacosamide  200 mg Oral Q12H [address] Floria Sevin, PA-C      levETIRAcetam  1,500 mg Oral BID Floria Sevin, PA-C      levothyroxine  125 mcg Oral Early Morning Floria Sevin, PA-C      lisinopril  10 mg Oral Daily Floria Sevin, PA-C      loratadine  10 mg Oral Daily Floria Sevin, PA-C      polyethylene glycol  17 g Oral Daily Floria Sevin, PA-C      pravastatin  80 mg Oral Daily With Triston Wise PA-C      QUEtiapine  200 mg Oral BID (AM & Afternoon) Floria Sevin, PA-DENIA      QUEtiapine  400 mg Oral HS Floria Sevin, PA-C      temazepam  15 mg Oral HS Floria Sevin, PA-C      vancomycin  1,250 mg Intravenous Q8H Richar Garcia MD 1,250 mg (09/11/21 3332)        Today, Patient Was Seen By: Richar Garcia MD    ** Please Note: Dictation voice to text software may have been used in the creation of this document   **

## 2021-09-11 NOTE — PROGRESS NOTES
Progress Note - General Surgery   Noelle Cabral 49 y o  male MRN: [id number]  Unit/Bed#: -02 Encounter: [id number]    Assessment:  53-year-old male with past medical history of cerebral palsy, now admitted with MRSA bacteremia secondary to left gluteal abscess 2/2 pressure wound  - s/p bedside I&D  - afebrile, vital signs stable  - WBC normalized  - dressing was changed at bedside,  Skin edges debrided circumferentially with mechanical and sharp debridement using scissors and gauze by Dr Yodit Coronel:  - continue local wound care twice daily and as needed  - continue IV antibiotics per ID recs  - may require additional bedside debridement, will continue to monitor wound daily  - patient will likely benefit from wound VAC placement, however upon examination today wound does not appear ready for [facility] placement, anticipate VAC placement on Monday  - continue medical management per primary team    Subjective/Objective   Subjective:  No acute events overnight  Patient does not appear to be in pain  States he feels well today  Objective: Blood pressure 127/63, pulse 88, temperature 97 5 °F (36 4 °C), temperature source Temporal, resp  rate 18, height 6' (1 829 m), weight 74 4 kg (164 lb), SpO2 96 %  ,Body mass index is 22 24 kg/m²  Intake/Output Summary (Last 24 hours) at 9/11/2021 1648  Last data filed at 9/11/2021 1101  Gross per 24 hour   Intake 740 ml   Output 4600 ml   Net -3860 ml       Invasive Devices     Peripheral Intravenous Line            Peripheral IV 09/11/21 Right Wrist <1 day          Drain            External Urinary Catheter Large 1 day                Physical Exam: /63 (BP Location: Left arm)   Pulse 88   Temp 97 5 °F (36 4 °C) (Temporal)   Resp 18   Ht 6' (1 829 m)   Wt 74 4 kg (164 lb)   SpO2 96%   BMI 22 24 kg/m²   General appearance: alert and oriented, in no acute distress, appears stated age and cooperative  Skin:  Wound to left buttock 6 cm x 6 cm x 2 5 cm  Clean and healthy wound base noted, some necrotic skin noted circumferentially which was debrided today down to healthy, bleeding tissue  No cellulitis noted      Lab, Imaging and other studies: No labs this AM  VTE Mechanical Prophylaxis: sequential compression device    Ramila Jj PA-C  09/11/21

## 2021-09-11 NOTE — ASSESSMENT & PLAN NOTE
· Secondary to left gluteal abscess, febrile and tachycardic on admission  · 1/2 sets of blood cultures positive for MRSA  · Patient status post bedside debridement on 09/06/2021 by surgical team   Continue wound management with surgical team  · Will continue IV vancomycin  · Aztreonam discontinued and patient transitioned to cefdinir per ID recommendation  · IV fluids as needed  · Will trend procalcitonin  · Cultures growing MRSA  · Repeat blood cultures with no growth  · Echo with no obvious vegetations  · Surgical team following  · PICC line to be placed on Monday

## 2021-09-11 NOTE — PROGRESS NOTES
Vancomycin IV Pharmacy-to-Dose Consultation    Monico Turpin is a 49 y o  male who is currently receiving Vancomycin IV with management by the Pharmacy Consult service  Assessment/Plan:  The patient was reviewed  Renal function is stable and no signs or symptoms of nephrotoxicity and/or infusion reactions were documented in the chart  Based on todays assessment, continue current vancomycin (day # 7) dosing of 1250mg iv q8h, with a plan for trough to be drawn at 0100 on 9/12/21  We will continue to follow the patients culture results and clinical progress daily      Pinky Tineo, Pharmacist

## 2021-09-12 PROBLEM — E83.42 HYPOMAGNESEMIA: Status: ACTIVE | Noted: 2021-09-12

## 2021-09-12 LAB
ANION GAP SERPL CALCULATED.3IONS-SCNC: 7 MMOL/L (ref 4–13)
BASOPHILS # BLD AUTO: 0.1 THOUSANDS/ΜL (ref 0–0.1)
BASOPHILS NFR BLD AUTO: 1 % (ref 0–2)
BUN SERPL-MCNC: 15 MG/DL (ref 7–25)
CALCIUM SERPL-MCNC: 7.7 MG/DL (ref 8.6–10.5)
CHLORIDE SERPL-SCNC: 108 MMOL/L (ref 98–107)
CO2 SERPL-SCNC: 25 MMOL/L (ref 21–31)
CREAT SERPL-MCNC: 0.37 MG/DL (ref 0.7–1.3)
EOSINOPHIL # BLD AUTO: 0.2 THOUSAND/ΜL (ref 0–0.61)
EOSINOPHIL NFR BLD AUTO: 2 % (ref 0–5)
ERYTHROCYTE [DISTWIDTH] IN BLOOD BY AUTOMATED COUNT: 13.6 % (ref 11.5–14.5)
GFR SERPL CREATININE-BSD FRML MDRD: 139 ML/MIN/1.73SQ M
GLUCOSE SERPL-MCNC: 116 MG/DL (ref 65–140)
GLUCOSE SERPL-MCNC: 125 MG/DL (ref 65–140)
GLUCOSE SERPL-MCNC: 134 MG/DL (ref 65–140)
GLUCOSE SERPL-MCNC: 188 MG/DL (ref 65–140)
GLUCOSE SERPL-MCNC: 99 MG/DL (ref 65–99)
HCT VFR BLD AUTO: 28.5 % (ref 42–47)
HGB BLD-MCNC: 9.7 G/DL (ref 14–18)
LYMPHOCYTES # BLD AUTO: 2.8 THOUSANDS/ΜL (ref 0.6–4.47)
LYMPHOCYTES NFR BLD AUTO: 29 % (ref 21–51)
MAGNESIUM SERPL-MCNC: 1.3 MG/DL (ref 1.9–2.7)
MCH RBC QN AUTO: 32.5 PG (ref 26–34)
MCHC RBC AUTO-ENTMCNC: 34 G/DL (ref 31–37)
MCV RBC AUTO: 96 FL (ref 81–99)
MONOCYTES # BLD AUTO: 1.3 THOUSAND/ΜL (ref 0.17–1.22)
MONOCYTES NFR BLD AUTO: 13 % (ref 2–12)
NEUTROPHILS # BLD AUTO: 5.3 THOUSANDS/ΜL (ref 1.4–6.5)
NEUTS SEG NFR BLD AUTO: 55 % (ref 42–75)
PLATELET # BLD AUTO: 382 THOUSANDS/UL (ref 149–390)
PMV BLD AUTO: 8 FL (ref 8.6–11.7)
POTASSIUM SERPL-SCNC: 3.8 MMOL/L (ref 3.5–5.5)
RBC # BLD AUTO: 2.98 MILLION/UL (ref 4.3–5.9)
SODIUM SERPL-SCNC: 140 MMOL/L (ref 134–143)
VANCOMYCIN TROUGH SERPL-MCNC: 13 UG/ML (ref 5–12)
WBC # BLD AUTO: 9.6 THOUSAND/UL (ref 4.8–10.8)

## 2021-09-12 PROCEDURE — 99232 SBSQ HOSP IP/OBS MODERATE 35: CPT | Performed by: INTERNAL MEDICINE

## 2021-09-12 PROCEDURE — 85027 COMPLETE CBC AUTOMATED: CPT | Performed by: INTERNAL MEDICINE

## 2021-09-12 PROCEDURE — 99024 POSTOP FOLLOW-UP VISIT: CPT | Performed by: PHYSICIAN ASSISTANT

## 2021-09-12 PROCEDURE — 80048 BASIC METABOLIC PNL TOTAL CA: CPT | Performed by: INTERNAL MEDICINE

## 2021-09-12 PROCEDURE — 83735 ASSAY OF MAGNESIUM: CPT | Performed by: INTERNAL MEDICINE

## 2021-09-12 PROCEDURE — 82948 REAGENT STRIP/BLOOD GLUCOSE: CPT

## 2021-09-12 PROCEDURE — 80202 ASSAY OF VANCOMYCIN: CPT | Performed by: INTERNAL MEDICINE

## 2021-09-12 RX ORDER — MAGNESIUM SULFATE HEPTAHYDRATE 40 MG/ML
2 INJECTION, SOLUTION INTRAVENOUS EVERY 6 HOURS
Status: COMPLETED | OUTPATIENT
Start: 2021-09-12 | End: 2021-09-12

## 2021-09-12 RX ADMIN — LACOSAMIDE 200 MG: 150 TABLET, FILM COATED ORAL at 21:32

## 2021-09-12 RX ADMIN — LEVETIRACETAM 1500 MG: 500 TABLET, FILM COATED ORAL at 09:29

## 2021-09-12 RX ADMIN — GABAPENTIN 100 MG: 100 CAPSULE ORAL at 16:45

## 2021-09-12 RX ADMIN — QUETIAPINE FUMARATE 200 MG: 50 TABLET, EXTENDED RELEASE ORAL at 09:28

## 2021-09-12 RX ADMIN — VANCOMYCIN HYDROCHLORIDE 1500 MG: 1 INJECTION, POWDER, LYOPHILIZED, FOR SOLUTION INTRAVENOUS at 17:12

## 2021-09-12 RX ADMIN — Medication 1000 UNITS: at 09:29

## 2021-09-12 RX ADMIN — CYANOCOBALAMIN TAB 500 MCG 1000 MCG: 500 TAB at 09:27

## 2021-09-12 RX ADMIN — VANCOMYCIN HYDROCHLORIDE 1250 MG: 1 INJECTION, POWDER, LYOPHILIZED, FOR SOLUTION INTRAVENOUS at 01:26

## 2021-09-12 RX ADMIN — DIVALPROEX SODIUM 1000 MG: 500 TABLET, EXTENDED RELEASE ORAL at 21:32

## 2021-09-12 RX ADMIN — HEPARIN SODIUM 5000 UNITS: 5000 INJECTION INTRAVENOUS; SUBCUTANEOUS at 14:16

## 2021-09-12 RX ADMIN — BENZTROPINE MESYLATE 0.5 MG: 0.5 TABLET ORAL at 09:29

## 2021-09-12 RX ADMIN — LACOSAMIDE 200 MG: 150 TABLET, FILM COATED ORAL at 09:28

## 2021-09-12 RX ADMIN — CALCIUM 1 TABLET: 500 TABLET ORAL at 09:27

## 2021-09-12 RX ADMIN — HEPARIN SODIUM 5000 UNITS: 5000 INJECTION INTRAVENOUS; SUBCUTANEOUS at 21:31

## 2021-09-12 RX ADMIN — LORATADINE 10 MG: 10 TABLET ORAL at 09:29

## 2021-09-12 RX ADMIN — HYDROXYZINE HYDROCHLORIDE 50 MG: 25 TABLET ORAL at 09:29

## 2021-09-12 RX ADMIN — CEFDINIR 300 MG: 300 CAPSULE ORAL at 21:32

## 2021-09-12 RX ADMIN — PRAVASTATIN SODIUM 80 MG: 40 TABLET ORAL at 16:45

## 2021-09-12 RX ADMIN — GABAPENTIN 100 MG: 100 CAPSULE ORAL at 21:32

## 2021-09-12 RX ADMIN — GABAPENTIN 100 MG: 100 CAPSULE ORAL at 09:30

## 2021-09-12 RX ADMIN — HYDROXYZINE HYDROCHLORIDE 50 MG: 25 TABLET ORAL at 21:32

## 2021-09-12 RX ADMIN — LEVETIRACETAM 1500 MG: 500 TABLET, FILM COATED ORAL at 17:12

## 2021-09-12 RX ADMIN — MAGNESIUM SULFATE HEPTAHYDRATE 2 G: 40 INJECTION, SOLUTION INTRAVENOUS at 11:31

## 2021-09-12 RX ADMIN — BENZTROPINE MESYLATE 0.5 MG: 0.5 TABLET ORAL at 17:12

## 2021-09-12 RX ADMIN — TEMAZEPAM 15 MG: 15 CAPSULE ORAL at 21:32

## 2021-09-12 RX ADMIN — LEVOTHYROXINE SODIUM 125 MCG: 25 TABLET ORAL at 05:59

## 2021-09-12 RX ADMIN — CALCIUM 1 TABLET: 500 TABLET ORAL at 16:45

## 2021-09-12 RX ADMIN — HEPARIN SODIUM 5000 UNITS: 5000 INJECTION INTRAVENOUS; SUBCUTANEOUS at 05:59

## 2021-09-12 RX ADMIN — VANCOMYCIN HYDROCHLORIDE 1250 MG: 1 INJECTION, POWDER, LYOPHILIZED, FOR SOLUTION INTRAVENOUS at 09:29

## 2021-09-12 RX ADMIN — QUETIAPINE FUMARATE 200 MG: 50 TABLET, EXTENDED RELEASE ORAL at 14:16

## 2021-09-12 RX ADMIN — CEFDINIR 300 MG: 300 CAPSULE ORAL at 09:30

## 2021-09-12 RX ADMIN — LISINOPRIL 10 MG: 10 TABLET ORAL at 09:30

## 2021-09-12 RX ADMIN — QUETIAPINE FUMARATE 400 MG: 50 TABLET, EXTENDED RELEASE ORAL at 21:31

## 2021-09-12 RX ADMIN — CALCIUM 1 TABLET: 500 TABLET ORAL at 11:31

## 2021-09-12 RX ADMIN — DIVALPROEX SODIUM 500 MG: 500 TABLET, EXTENDED RELEASE ORAL at 09:29

## 2021-09-12 RX ADMIN — MAGNESIUM SULFATE HEPTAHYDRATE 2 G: 40 INJECTION, SOLUTION INTRAVENOUS at 16:46

## 2021-09-12 RX ADMIN — HYDROXYZINE HYDROCHLORIDE 50 MG: 25 TABLET ORAL at 16:45

## 2021-09-12 NOTE — PROGRESS NOTES
[facility]  Progress Note Olinda Marte 1965, 49 y o  male MRN: [id number]  Unit/Bed#: -02 Encounter: [id number]  Primary Care Provider: Dana Ramirez PA-C   Date and time admitted to hospital: 9/5/2021  5:40 PM    * Sepsis [facility])  Assessment & Plan  · Secondary to left gluteal abscess, febrile and tachycardic on admission  · 1/2 sets of blood cultures positive for MRSA  · Patient has been having daily bedside debridement by surgical, wound is gradually improving and should be ready for wound VAC placement tomorrow  · Will continue IV vancomycin  · Aztreonam discontinued and patient transitioned to cefdinir per ID recommendation  · IV fluids as needed  · Will trend procalcitonin  · Cultures growing MRSA  · Repeat blood cultures with no growth  · Echo with no obvious vegetations  · Surgical team following  · PICC line to be placed on Monday    Hypomagnesemia  Assessment & Plan  · Patient has received a total of 4 g of magnesium today  · Will monitor magnesium level    Bacteremia due to Gram-positive bacteria  Assessment & Plan  · Blood cultures 1 of 2 positive for MRSA  · Continue antibiotics as above    Gluteal abscess  Assessment & Plan  · Surgical team following  Will continue treatment as above    Hyponatremia  Assessment & Plan  · Likely secondary to hypovolemia, improved with IV fluids    Metabolic encephalopathy  Assessment & Plan  · Likely secondary to sepsis/bacteremia  · Mental status appears to be back at baseline  · Will continue supportive care    Essential hypertension  Assessment & Plan  BP stable  Continue lisinopril    Generalized convulsive epilepsy ([facility] [address] )  Assessment & Plan  Continue pre-hospital Keppra 1500 mg p o  B i d , Depakote  mg p o  Daily and 1000 mg p o  Q h s , Klonopin 0 25 mg p o  B i d  and Vimpat 200 mg p o  B i d      Type 2 diabetes mellitus without complication, without long-term current use of insulin ([facility])  Assessment & Plan  · Diabetic diet  · Insulin sliding scale    VTE Prophylaxis:  Heparin    Patient Centered Rounds: I have performed bedside rounds with nursing staff today  Discussions with Specialists or Other Care Team Provider: yes  Education and Discussions with Family / Patient:  Spoke with patient's sister over the phone regarding plan of care    Current Length of Stay: 7 day(s)    Current Patient Status: Inpatient   Certification Statement: The patient will continue to require additional inpatient hospital stay due to Sepsis    Discharge Plan:  Pending hospital course    Code Status: Level 1 - Full Code    Subjective:   No overnight events noted  Objective:     Vitals:   Temp (24hrs), Av 5 °F (36 4 °C), Min:97 1 °F (36 2 °C), Max:97 8 °F (36 6 °C)    Temp:  [97 1 °F (36 2 °C)-97 8 °F (36 6 °C)] 97 1 °F (36 2 °C)  HR:  [86-88] 88  Resp:  [18] 18  BP: (119-128)/(60-72) 119/60  SpO2:  [96 %] 96 %  Body mass index is 22 24 kg/m²  Input and Output Summary (last 24 hours): Intake/Output Summary (Last 24 hours) at 2021 1101  Last data filed at 2021 0500  Gross per 24 hour   Intake 450 ml   Output --   Net 450 ml       Physical Exam:   Physical Exam  Constitutional:       General: He is not in acute distress  Comments: Frail  male   HENT:      Head: Normocephalic and atraumatic  Nose: Nose normal       Mouth/Throat:      Mouth: Mucous membranes are moist    Eyes:      Extraocular Movements: Extraocular movements intact  Conjunctiva/sclera: Conjunctivae normal    Cardiovascular:      Rate and Rhythm: Normal rate and regular rhythm  Pulmonary:      Effort: Pulmonary effort is normal  No respiratory distress  Abdominal:      Palpations: Abdomen is soft  Tenderness: There is no abdominal tenderness  Musculoskeletal:         General: Normal range of motion  Cervical back: Normal range of motion and neck supple  Skin:     General: Skin is warm and dry  Comments: Left gluteal skin wound with dressing in place   Neurological:      General: No focal deficit present  Mental Status: He is alert  Mental status is at baseline  Cranial Nerves: No cranial nerve deficit  Psychiatric:         Mood and Affect: Mood normal          Behavior: Behavior normal       Comments: Baseline cognitive impairment         Additional Data:     Labs:    Results from last 7 days   Lab Units 09/12/21  0559   WBC Thousand/uL 9 60   HEMOGLOBIN g/dL 9 7*   HEMATOCRIT % 28 5*   PLATELETS Thousands/uL 382   NEUTROS PCT % 55   LYMPHS PCT % 29   MONOS PCT % 13*   EOS PCT % 2     Results from last 7 days   Lab Units 09/12/21  0559 09/05/21  1906   SODIUM mmol/L 140 132*   POTASSIUM mmol/L 3 8 3 9   CHLORIDE mmol/L 108* 92*   CO2 mmol/L 25 27   BUN mg/dL 15 13   CREATININE mg/dL 0 37* 0 67*   CALCIUM mg/dL 7 7* 8 5*   ALK PHOS U/L  --  50   ALT U/L  --  30   AST U/L  --  36         Results from last 7 days   Lab Units 09/12/21  0626 09/11/21 2016 09/11/21  1538 09/11/21  1131 09/11/21  0548 09/10/21  2054 09/10/21  1551 09/10/21  1031 09/10/21  0639 09/09/21  2114 09/09/21  1553 09/09/21  1053   POC GLUCOSE mg/dl 116 138 141* 142* 93 199* 151* 157* 202* 108 132 137           * I Have Reviewed All Lab Data Listed Above  * Additional Pertinent Lab Tests Reviewed: [facility]  Reviewed    Imaging:  Imaging Reports Reviewed Today Include:  No new imaging    Recent Cultures (last 7 days):     Results from last 7 days   Lab Units 09/08/21  0516 09/06/21  0927 09/05/21 2015 09/05/21  1906   BLOOD CULTURE  No Growth at 72 hrs  No Growth at 72 hrs   --   --  No Growth After 5 Days    Methicillin Resistant Staphylococcus aureus*   GRAM STAIN RESULT   --  2+ Polys*  3+ Gram positive cocci in pairs, chains and clusters* 3+ Gram positive cocci in pairs, chains and clusters*  No polys seen* Gram positive cocci in clusters*   WOUND CULTURE   --  3+ Growth of Methicillin Resistant Staphylococcus aureus* 4+ Growth of Methicillin Resistant Staphylococcus aureus*  1+ Growth of Escherichia coli*  --        Last 24 Hours Medication List:   Current Facility-Administered Medications   Medication Dose Route Frequency Provider Last Rate    benzonatate  200 mg Oral TID PRN Lucyann Calender, PA-DENIA      benztropine  0 5 mg Oral BID Lucyann Calender, PA-C      calcium carbonate  1 tablet Oral TID With Meals Lucyann Calender, PAJUDD      cefdinir  300 mg Oral Q12H Chinedu Wolf MD      cholecalciferol  1,000 Units Oral Daily Lucyann Calender, PA-DENIA      clonazePAM  0 25 mg Oral BID PRN Lucyann Calender, PA-C      vitamin B-12  1,000 mcg Oral Daily Lucyann Calender, PA-DENIA      divalproex sodium  1,000 mg Oral HS Lucyann Calender, PA-DENIA      divalproex sodium  500 mg Oral Daily Lucyann Calender, PA-C      gabapentin  100 mg Oral TID Lucyann Calender, PA-DENIA      heparin (porcine)  5,000 Units Subcutaneous [facility] Juaquin Nathan MD      hydrOXYzine HCL  50 mg Oral TID Lucyann Calender, PA-DENIA      insulin lispro  1-5 Units Subcutaneous HS [employer], PA-C      insulin lispro  2-12 Units Subcutaneous TID AC Cora Ballard, JAIR      lacosamide  200 mg Oral Q12H [facility] Lucyann Calender, PA-DENIA      levETIRAcetam  1,500 mg Oral BID Lucyann Calender, PA-C      levothyroxine  125 mcg Oral Early Morning Lucyann Calender, PA-C      lisinopril  10 mg Oral Daily Lucyann Calender, PA-C      loratadine  10 mg Oral Daily Lucyann Calender, PA-C      magnesium sulfate  2 g Intravenous Q6H Juaquin Nathan MD      polyethylene glycol  17 g Oral Daily Lucyann Calender, PA-DENIA      pravastatin  80 mg Oral Daily With [employer], PA-DENIA      QUEtiapine  200 mg Oral BID (AM & Afternoon) Lucyann Calender, PA-DENIA      QUEtiapine  400 mg Oral HS Lucyann Calender, PA-DENIA      temazepam  15 mg Oral HS Lucyann Calender, PA-DENIA      vancomycin  1,250 mg Intravenous Q8H Juaquin Nathan MD 1,250 mg (09/12/21 1128)        Today, Patient Was Seen By: Nnamdi Velasco MD    ** Please Note: Dictation voice to text software may have been used in the creation of this document   **

## 2021-09-12 NOTE — PROGRESS NOTES
Vancomycin Assessment    Michael Daigle is a 49 y o  male who is currently receiving vancomycin 1250mg iv q8h for skin-soft tissue infection, MRSA confirmed     Relevant clinical data and objective history reviewed:  Creatinine   Date Value Ref Range Status   09/12/2021 0 37 (L) 0 70 - 1 30 mg/dL Final     Comment:     Standardized to IDMS reference method   09/10/2021 0 44 (L) 0 70 - 1 30 mg/dL Final     Comment:     Standardized to IDMS reference method   09/09/2021 0 41 (L) 0 70 - 1 30 mg/dL Final     Comment:     Standardized to IDMS reference method     /60 (BP Location: Left arm)   Pulse 88   Temp (!) 97 1 °F (36 2 °C)   Resp 18   Ht 6' (1 829 m)   Wt 74 4 kg (164 lb)   SpO2 96%   BMI 22 24 kg/m²   I/O last 3 completed shifts: In: 450 [P O :450]  Out: 3600 [Urine:3600]  Lab Results   Component Value Date/Time    BUN 15 09/12/2021 05:59 AM    WBC 9 60 09/12/2021 05:59 AM    HGB 9 7 (L) 09/12/2021 05:59 AM    HCT 28 5 (L) 09/12/2021 05:59 AM    MCV 96 09/12/2021 05:59 AM     09/12/2021 05:59 AM     Temp Readings from Last 3 Encounters:   09/12/21 (!) 97 1 °F (36 2 °C)   05/18/21 97 9 °F (36 6 °C) (Tympanic)   05/18/21 (!) 96 9 °F (36 1 °C)     Vancomycin Days of Therapy: 8    Assessment/Plan  The patient is currently on vancomycin utilizing scheduled dosing  Baseline risks associated with therapy include:  none   The patient is receiving 1250mg iv q8h with the most recent vancomycin level being at steady-state and sub-therapeutic based on a goal of 15-20 (appropriate for most indications) ; therefore, after clinical evaluation will be changed to 1500mg iv q8h   Pharmacy will continue to follow closely for s/sx of nephrotoxicity, infusion reactions, and appropriateness of therapy  BMP and CBC will be ordered per protocol  Plan for trough as patient approaches steady state, prior to the 4th  dose at approximately 1700 on 9/13/21   Pharmacy will continue to follow the patients culture results and clinical progress daily      Coleen Lux, Pharmacist

## 2021-09-12 NOTE — ASSESSMENT & PLAN NOTE
· Secondary to left gluteal abscess, febrile and tachycardic on admission  · 1/2 sets of blood cultures positive for MRSA  · Patient has been having daily bedside debridement by surgical, wound is gradually improving and should be ready for wound VAC placement tomorrow  · Will continue IV vancomycin  · Aztreonam discontinued and patient transitioned to cefdinir per ID recommendation  · IV fluids as needed  · Will trend procalcitonin  · Cultures growing MRSA  · Repeat blood cultures with no growth  · Echo with no obvious vegetations  · Surgical team following  · PICC line to be placed on Monday

## 2021-09-12 NOTE — PLAN OF CARE
Problem: MOBILITY - ADULT  Goal: Maintain or return to baseline ADL function  Description: INTERVENTIONS:  -  Assess patient's ability to carry out ADLs; assess patient's baseline for ADL function and identify physical deficits which impact ability to perform ADLs (bathing, care of mouth/teeth, toileting, grooming, dressing, etc )  - Assess/evaluate cause of self-care deficits   - Assess range of motion  - Assess patient's mobility; develop plan if impaired  - Assess patient's need for assistive devices and provide as appropriate  - Encourage maximum independence but intervene and supervise when necessary  - Involve family in performance of ADLs  - Assess for home care needs following discharge   - Consider OT consult to assist with ADL evaluation and planning for discharge  - Provide patient education as appropriate  Outcome: Progressing  Goal: Maintains/Returns to pre admission functional level  Description: INTERVENTIONS:  - Perform BMAT or MOVE assessment daily    - Set and communicate daily mobility goal to care team and patient/family/caregiver  - Collaborate with rehabilitation services on mobility goals if consulted  - Perform Range of Motion 4 times a day  - Reposition patient every 2 hours    - Dangle patient 2 times a day  - Stand patient 2 times a day  - Ambulate patient 2 times a day  - Out of bed to chair 3 times a day   - Out of bed for meals 3 times a day  - Out of bed for toileting  - Record patient progress and toleration of activity level   Outcome: Progressing

## 2021-09-12 NOTE — PROGRESS NOTES
Progress Note - General Surgery   Rufino Jiang 49 y o  male MRN: [id number]  Unit/Bed#: -02 Encounter: [id number]    Assessment:  82-year-old male with past medical history of cerebral palsy, now admitted with MRSA bacteremia secondary to left gluteal abscess 2/2 pressure wound  - area was debrided yesterday by Dr Jana Garcia  - dressing changed this morning, wound cleansed, wound bed appears healthy, clean, no surrounding cellulitis   - afebrile, vital signs stable  - leukocytosis resolved    Plan:  - no plan for surgical intervention at this time  - continue local wound care twice daily and as needed  - continue IV antibiotics per ID recs  - patient will likely benefit from wound VAC therapy, anticipate VAC placement on Monday  - continue medical management per primary team    Subjective/Objective   Subjective:  No acute events overnight  Patient was seen this morning resting comfortably in bed  Objective:   Blood pressure 119/60, pulse 88, temperature (!) 97 1 °F (36 2 °C), resp  rate 18, height 6' (1 829 m), weight 74 4 kg (164 lb), SpO2 96 %  ,Body mass index is 22 24 kg/m²  Intake/Output Summary (Last 24 hours) at 9/12/2021 1045  Last data filed at 9/12/2021 0500  Gross per 24 hour   Intake 450 ml   Output 1500 ml   Net -1050 ml       Invasive Devices     Peripheral Intravenous Line            Peripheral IV 09/12/21 Left Forearm <1 day          Drain            External Urinary Catheter Large 2 days                Physical Exam: /60 (BP Location: Left arm)   Pulse 88   Temp (!) 97 1 °F (36 2 °C)   Resp 18   Ht 6' (1 829 m)   Wt 74 4 kg (164 lb)   SpO2 96%   BMI 22 24 kg/m²   General appearance: alert and oriented, in no acute distress, appears stated age and cooperative  Skin:  Wound to left buttock  Wound bed is clean and appears healthy, wound edges probed with Q-tip, no cellulitis surrounding wound  Area was repacked and dressed      Lab, Imaging and other studies:  I have personally reviewed pertinent lab results    , CBC:   Lab Results   Component Value Date    WBC 9 60 09/12/2021    HGB 9 7 (L) 09/12/2021    HCT 28 5 (L) 09/12/2021    MCV 96 09/12/2021     09/12/2021    MCH 32 5 09/12/2021    MCHC 34 0 09/12/2021    RDW 13 6 09/12/2021    MPV 8 0 (L) 09/12/2021   , CMP:   Lab Results   Component Value Date    SODIUM 140 09/12/2021    K 3 8 09/12/2021     (H) 09/12/2021    CO2 25 09/12/2021    BUN 15 09/12/2021    CREATININE 0 37 (L) 09/12/2021    CALCIUM 7 7 (L) 09/12/2021    EGFR 139 09/12/2021     VTE Mechanical Prophylaxis: sequential compression device    Ramila Jj PA-C  09/12/21

## 2021-09-13 ENCOUNTER — APPOINTMENT (INPATIENT)
Dept: INTERVENTIONAL RADIOLOGY/VASCULAR | Facility: HOSPITAL | Age: 56
DRG: 853 | End: 2021-09-13
Attending: INTERNAL MEDICINE
Payer: MEDICARE

## 2021-09-13 LAB
ANION GAP SERPL CALCULATED.3IONS-SCNC: 7 MMOL/L (ref 4–13)
BACTERIA BLD CULT: NORMAL
BACTERIA BLD CULT: NORMAL
BUN SERPL-MCNC: 19 MG/DL (ref 7–25)
CALCIUM SERPL-MCNC: 9.2 MG/DL (ref 8.6–10.5)
CHLORIDE SERPL-SCNC: 101 MMOL/L (ref 98–107)
CO2 SERPL-SCNC: 28 MMOL/L (ref 21–31)
CREAT SERPL-MCNC: 0.49 MG/DL (ref 0.7–1.3)
ERYTHROCYTE [DISTWIDTH] IN BLOOD BY AUTOMATED COUNT: 13.8 % (ref 11.5–14.5)
GFR SERPL CREATININE-BSD FRML MDRD: 124 ML/MIN/1.73SQ M
GLUCOSE SERPL-MCNC: 115 MG/DL (ref 65–99)
GLUCOSE SERPL-MCNC: 117 MG/DL (ref 65–140)
GLUCOSE SERPL-MCNC: 118 MG/DL (ref 65–140)
GLUCOSE SERPL-MCNC: 131 MG/DL (ref 65–140)
GLUCOSE SERPL-MCNC: 139 MG/DL (ref 65–140)
HCT VFR BLD AUTO: 33.8 % (ref 42–47)
HGB BLD-MCNC: 11.5 G/DL (ref 14–18)
MAGNESIUM SERPL-MCNC: 1.8 MG/DL (ref 1.9–2.7)
MCH RBC QN AUTO: 32.4 PG (ref 26–34)
MCHC RBC AUTO-ENTMCNC: 33.9 G/DL (ref 31–37)
MCV RBC AUTO: 96 FL (ref 81–99)
PLATELET # BLD AUTO: 451 THOUSANDS/UL (ref 149–390)
PMV BLD AUTO: 8.1 FL (ref 8.6–11.7)
POTASSIUM SERPL-SCNC: 4.2 MMOL/L (ref 3.5–5.5)
RBC # BLD AUTO: 3.54 MILLION/UL (ref 4.3–5.9)
SODIUM SERPL-SCNC: 136 MMOL/L (ref 134–143)
WBC # BLD AUTO: 9.9 THOUSAND/UL (ref 4.8–10.8)

## 2021-09-13 PROCEDURE — C1751 CATH, INF, PER/CENT/MIDLINE: HCPCS

## 2021-09-13 PROCEDURE — 76937 US GUIDE VASCULAR ACCESS: CPT

## 2021-09-13 PROCEDURE — 97605 NEG PRS WND THER DME<=50SQCM: CPT | Performed by: SURGERY

## 2021-09-13 PROCEDURE — 36573 INSJ PICC RS&I 5 YR+: CPT | Performed by: RADIOLOGY

## 2021-09-13 PROCEDURE — 99024 POSTOP FOLLOW-UP VISIT: CPT | Performed by: PHYSICIAN ASSISTANT

## 2021-09-13 PROCEDURE — 99232 SBSQ HOSP IP/OBS MODERATE 35: CPT | Performed by: HOSPITALIST

## 2021-09-13 PROCEDURE — NC001 PR NO CHARGE: Performed by: RADIOLOGY

## 2021-09-13 PROCEDURE — 2W15X6Z COMPRESSION OF BACK USING PRESSURE DRESSING: ICD-10-PCS | Performed by: SURGERY

## 2021-09-13 PROCEDURE — 80048 BASIC METABOLIC PNL TOTAL CA: CPT | Performed by: INTERNAL MEDICINE

## 2021-09-13 PROCEDURE — 83735 ASSAY OF MAGNESIUM: CPT | Performed by: INTERNAL MEDICINE

## 2021-09-13 PROCEDURE — 02HV33Z INSERTION OF INFUSION DEVICE INTO SUPERIOR VENA CAVA, PERCUTANEOUS APPROACH: ICD-10-PCS | Performed by: RADIOLOGY

## 2021-09-13 PROCEDURE — 82948 REAGENT STRIP/BLOOD GLUCOSE: CPT

## 2021-09-13 PROCEDURE — 85027 COMPLETE CBC AUTOMATED: CPT | Performed by: INTERNAL MEDICINE

## 2021-09-13 RX ORDER — LIDOCAINE WITH 8.4% SOD BICARB 0.9%(10ML)
SYRINGE (ML) INJECTION CODE/TRAUMA/SEDATION MEDICATION
Status: COMPLETED | OUTPATIENT
Start: 2021-09-13 | End: 2021-09-13

## 2021-09-13 RX ADMIN — HYDROXYZINE HYDROCHLORIDE 50 MG: 25 TABLET ORAL at 22:55

## 2021-09-13 RX ADMIN — LACOSAMIDE 200 MG: 150 TABLET, FILM COATED ORAL at 11:04

## 2021-09-13 RX ADMIN — GABAPENTIN 100 MG: 100 CAPSULE ORAL at 22:55

## 2021-09-13 RX ADMIN — PRAVASTATIN SODIUM 80 MG: 40 TABLET ORAL at 16:27

## 2021-09-13 RX ADMIN — VANCOMYCIN HYDROCHLORIDE 1500 MG: 1 INJECTION, POWDER, LYOPHILIZED, FOR SOLUTION INTRAVENOUS at 16:43

## 2021-09-13 RX ADMIN — GABAPENTIN 100 MG: 100 CAPSULE ORAL at 16:28

## 2021-09-13 RX ADMIN — VANCOMYCIN HYDROCHLORIDE 1500 MG: 1 INJECTION, POWDER, LYOPHILIZED, FOR SOLUTION INTRAVENOUS at 10:55

## 2021-09-13 RX ADMIN — Medication 10 ML: at 14:32

## 2021-09-13 RX ADMIN — CALCIUM 1 TABLET: 500 TABLET ORAL at 11:03

## 2021-09-13 RX ADMIN — LACOSAMIDE 200 MG: 150 TABLET, FILM COATED ORAL at 22:55

## 2021-09-13 RX ADMIN — CALCIUM 1 TABLET: 500 TABLET ORAL at 16:27

## 2021-09-13 RX ADMIN — HYDROXYZINE HYDROCHLORIDE 50 MG: 25 TABLET ORAL at 16:27

## 2021-09-13 RX ADMIN — QUETIAPINE FUMARATE 200 MG: 50 TABLET, EXTENDED RELEASE ORAL at 11:05

## 2021-09-13 RX ADMIN — DIVALPROEX SODIUM 1000 MG: 500 TABLET, EXTENDED RELEASE ORAL at 22:55

## 2021-09-13 RX ADMIN — TEMAZEPAM 15 MG: 15 CAPSULE ORAL at 22:55

## 2021-09-13 RX ADMIN — CYANOCOBALAMIN TAB 500 MCG 1000 MCG: 500 TAB at 11:07

## 2021-09-13 RX ADMIN — HEPARIN SODIUM 5000 UNITS: 5000 INJECTION INTRAVENOUS; SUBCUTANEOUS at 22:56

## 2021-09-13 RX ADMIN — GABAPENTIN 100 MG: 100 CAPSULE ORAL at 11:07

## 2021-09-13 RX ADMIN — DIVALPROEX SODIUM 500 MG: 500 TABLET, EXTENDED RELEASE ORAL at 11:06

## 2021-09-13 RX ADMIN — BENZTROPINE MESYLATE 0.5 MG: 0.5 TABLET ORAL at 11:07

## 2021-09-13 RX ADMIN — CEFDINIR 300 MG: 300 CAPSULE ORAL at 11:05

## 2021-09-13 RX ADMIN — HEPARIN SODIUM 5000 UNITS: 5000 INJECTION INTRAVENOUS; SUBCUTANEOUS at 05:43

## 2021-09-13 RX ADMIN — QUETIAPINE FUMARATE 200 MG: 50 TABLET, EXTENDED RELEASE ORAL at 11:06

## 2021-09-13 RX ADMIN — QUETIAPINE FUMARATE 400 MG: 50 TABLET, EXTENDED RELEASE ORAL at 22:53

## 2021-09-13 RX ADMIN — LEVETIRACETAM 1500 MG: 500 TABLET, FILM COATED ORAL at 11:05

## 2021-09-13 RX ADMIN — HYDROXYZINE HYDROCHLORIDE 50 MG: 25 TABLET ORAL at 11:05

## 2021-09-13 RX ADMIN — LEVOTHYROXINE SODIUM 125 MCG: 25 TABLET ORAL at 05:43

## 2021-09-13 RX ADMIN — CALCIUM 1 TABLET: 500 TABLET ORAL at 11:04

## 2021-09-13 RX ADMIN — VANCOMYCIN HYDROCHLORIDE 1500 MG: 1 INJECTION, POWDER, LYOPHILIZED, FOR SOLUTION INTRAVENOUS at 01:42

## 2021-09-13 RX ADMIN — CLONAZEPAM 0.25 MG: 0.5 TABLET ORAL at 11:03

## 2021-09-13 RX ADMIN — Medication 1000 UNITS: at 11:03

## 2021-09-13 RX ADMIN — LEVETIRACETAM 1500 MG: 500 TABLET, FILM COATED ORAL at 17:55

## 2021-09-13 RX ADMIN — LORATADINE 10 MG: 10 TABLET ORAL at 11:03

## 2021-09-13 RX ADMIN — BENZTROPINE MESYLATE 0.5 MG: 0.5 TABLET ORAL at 17:55

## 2021-09-13 RX ADMIN — LISINOPRIL 10 MG: 10 TABLET ORAL at 11:04

## 2021-09-13 NOTE — ASSESSMENT & PLAN NOTE
51-year-old male history of CP POD#7 s/p I&D/debridement of large abscessed L ischium decubitus ulcer and sepsis with MRSA bacteremia  No complaints  Wound clean and open, mild slough around edges  AFVSS  Labs noted  Wound culture 9/6 (I&D): MRSA    Assessment:  Large abscessed decubitus ulcer of left ischium/buttock with full thickness skin necrosis, MRSA positive  Sepsis with MRSA bacteremia  Plan:  VAC applied  Change M/W/F schedule  Wet to dry if it comes off early  Transition to home unit when available  Stable for d/c surgically  Outpatient follow-up in 2 weeks

## 2021-09-13 NOTE — ASSESSMENT & PLAN NOTE
· Secondary to left gluteal abscess, febrile and tachycardic on admission  · 1/2 blood cultures positive for MRSA  · Status post an ID evaluation  · Status post a completed course of p o   Cefdinir on 09/12/2021  · Patient have a PICC line placed today  · Patient will go home eventually on IV vancomycin through September 22, 2021  · Hopeful discharge planning in the next 24 hours

## 2021-09-13 NOTE — PROCEDURES
PICC Line Insertion    Date/Time: 9/13/2021 4:06 PM  Performed by: Jorge Pichardo MD  Authorized by: Jorge Pichardo MD     Patient location:  IR  Consent:     Consent obtained:  Written    Consent given by:  Patient    Risks discussed:  Arterial puncture, bleeding and infection    Alternatives discussed:  No treatment and delayed treatment  Universal protocol:     Procedure explained and questions answered to patient or proxy's satisfaction: yes      Relevant documents present and verified: yes      Test results available and properly labeled: yes      Radiology Images displayed and confirmed  If images not available, report reviewed: yes      Required blood products, implants, devices, and special equipment available: yes      Site/side marked: yes      Immediately prior to procedure, a time out was called: yes      Patient identity confirmed:  Verbally with patient and arm band  Pre-procedure details:     Hand hygiene: Hand hygiene performed prior to insertion      Sterile barrier technique: All elements of maximal sterile technique followed      Skin preparation:  2% chlorhexidine    Skin preparation agent: Skin preparation agent completely dried prior to procedure    Anesthesia (see MAR for exact dosages):      Anesthesia method:  Local infiltration    Local anesthetic:  Lidocaine 1% w/o epi  Procedure details:     Location:  Brachial    Vessel type: vein      Laterality:  Right    Site selection rationale:  Can not supinate the other arm, due to stroke    Approach: percutaneous technique used      Patient position:  Flat    Procedural supplies:  Single lumen    Catheter size:  4 5 Fr    Landmarks identified: yes      Ultrasound guidance: yes      Sterile ultrasound techniques: Sterile gel and sterile probe covers were used      Number of attempts:  1    Successful placement: yes      Total catheter length (cm):  44    Catheter out on skin (cm):  0    Max flow rate:  5    Arm circumference: 25  Post-procedure details:     Post-procedure:  Dressing applied and securement device placed    Assessment:  Blood return through all ports and placement verified by x-ray    Post-procedure complications: none      Patient tolerance of procedure:   Tolerated well, no immediate complications

## 2021-09-13 NOTE — ASSESSMENT & PLAN NOTE
· Continue pre-hospital Keppra 1500 mg p o  B i d , Depakote  mg p o   Daily and 1000 mg p o  Q h s , Klonopin 0 25 mg p o  B i d  and Vimpat 200 mg p o  B i d

## 2021-09-13 NOTE — PROGRESS NOTES
[facility]  Progress Note Ashwini Ley 1965, 49 y o  male MRN: [id number]  Unit/Bed#: -02 Encounter: [id number]  Primary Care Provider: Kathryn Neal PA-C   Date and time admitted to hospital: 9/5/2021  5:40 PM    Gluteal abscess  Assessment & Plan  81-year-old male history of CP POD#7 s/p I&D/debridement of large abscessed L ischium decubitus ulcer and sepsis with MRSA bacteremia  No complaints  Wound clean and open, mild slough around edges  AFVSS  Labs noted  Wound culture 9/6 (I&D): MRSA    Assessment:  Large abscessed decubitus ulcer of left ischium/buttock with full thickness skin necrosis, MRSA positive  Sepsis with MRSA bacteremia  Plan:  VAC applied  Change M/W/F schedule  Wet to dry if it comes off early  Transition to home unit when available  Stable for d/c surgically  Outpatient follow-up in 2 weeks  Subjective/Objective   Chief Complaint: fine    Subjective: offers no complaints    Objective: going for PICC today    Blood pressure 119/56, pulse 95, temperature 97 6 °F (36 4 °C), temperature source Tympanic, resp  rate 18, height 6' (1 829 m), weight 74 4 kg (164 lb), SpO2 96 %  ,Body mass index is 22 24 kg/m²  Intake/Output Summary (Last 24 hours) at 9/13/2021 1517  Last data filed at 9/12/2021 2236  Gross per 24 hour   Intake 450 ml   Output 200 ml   Net 250 ml       Invasive Devices     Peripherally Inserted Central Catheter Line            PICC Line 09/13/21 Right Brachial <1 day          Peripheral Intravenous Line            Peripheral IV 09/12/21 Left Forearm 1 day          Drain            External Urinary Catheter Large 3 days                Physical Exam  Vitals and nursing note reviewed  Constitutional:       General: He is not in acute distress  Appearance: He is well-developed  He is not diaphoretic  HENT:      Head: Normocephalic and atraumatic     Eyes:      Conjunctiva/sclera: Conjunctivae normal  Pupils: Pupils are equal, round, and reactive to light  Pulmonary:      Effort: No respiratory distress  Musculoskeletal:         General: Normal range of motion  Cervical back: Normal range of motion  Skin:     General: Skin is warm and dry  Capillary Refill: Capillary refill takes less than 2 seconds  Neurological:      Mental Status: He is alert and oriented to person, place, and time  Psychiatric:         Behavior: Behavior normal            Lab, Imaging and other studies:  I have personally reviewed pertinent lab results    , CBC:   Lab Results   Component Value Date    WBC 9 90 09/13/2021    HGB 11 5 (L) 09/13/2021    HCT 33 8 (L) 09/13/2021    MCV 96 09/13/2021     (H) 09/13/2021    MCH 32 4 09/13/2021    MCHC 33 9 09/13/2021    RDW 13 8 09/13/2021    MPV 8 1 (L) 09/13/2021   , CMP:   Lab Results   Component Value Date    SODIUM 136 09/13/2021    K 4 2 09/13/2021     09/13/2021    CO2 28 09/13/2021    BUN 19 09/13/2021    CREATININE 0 49 (L) 09/13/2021    CALCIUM 9 2 09/13/2021    EGFR 124 09/13/2021     VTE Pharmacologic Prophylaxis: Heparin  VTE Mechanical Prophylaxis: sequential compression device

## 2021-09-13 NOTE — DISCHARGE INSTRUCTIONS
[address] [facility]  Interventional Radiology  Dr Casandra Shirley: [phone] (M-F 7:30am - 4:00pm)  Off hours or no answer: [phone] (Ask for IR on call)         Peripherally Inserted Central Catheter     WHAT Elizabeth:   A PICC is an IV placed into a large blood vessel near your heart  It is usually inserted through a blood vessel in your arm  Your PICC may have multiple ports  Ports are tubes where you can inject medicine  A PICC can stay in place for several weeks or months  You may need a PICC to get nutrition, medicine, or fluids  Blood samples can be removed from your PICC and sent to the lab for tests  DISCHARGE INSTRUCTIONS:    [address] patients,    Contact Interventional Radiology at [phone] PATIENTS: Contact Interventional Radiology at [phone]   [facility] PATIENTS: Contact Interventional Radiology at [phone] if:  · Blood soaks through your bandage  · Your arm or leg feels warm, tender, and painful  It may look swollen and red  · You have trouble moving your arm  · Your catheter falls out  · You have a fever or swelling, redness, pain, or pus where the catheter was inserted  · Persistent nausea or vomiting  · You cannot flush your catheter, or you feel pain when you flush your catheter  · You see a hole or crack in the tubing of your catheter  · You see fluid leaking from the insertion site  · You run out of supplies to care for your catheter  · You have questions or concerns about your condition or care  PICC (Peripherally Inserted Central Catheter)   AMBULATORY CARE:   What you need to know about a peripherally inserted central catheter (PICC):  A PICC is a catheter (small tube) used to give treatments and to take blood  The catheter is inserted into an arm vein  These veins are called peripheral veins   The catheter is guided through the peripheral vein into a central vein near your heart  Why you may need a PICC:   · You need to have your blood drawn often  · You will be given medicines often, or you need medicines that must work quickly  · Your central venous pressure needs to be monitored  · You need IV medicines after you leave the hospital  The PICC allows you to get your medicines at home  · You cannot eat or drink anything by mouth  What you need to know about how a PICC is placed:   · You will get local anesthesia to numb the area  The catheter will be put into a vein  It will be guided up until the tip is in the vena cava  This is an area close to your heart  Ultrasound or x-ray pictures will be used to make sure the catheter is in the correct position  The other end of the catheter will stay outside your body  · The catheter will be flushed with liquid  Heparin may be used to flush the line to prevent blood clots  Medicine will be placed at the insertion site (the place where it goes into your skin)  The medicine helps prevent an infection at the site  · The catheter will be secured to your skin with a dressing  The dressing holds it in place, keeps it clean, and helps prevent infection  The dressing will be clear so you can check the insertion site for signs of infection  Another x-ray helps make sure the catheter is in the right place and is ready for you to use  · Healthcare providers will watch for problems during the PICC placement  You could have bleeding when the PICC is inserted  An infection could develop at the insertion site  An infection that enters your bloodstream can cause serious illness  Rarely, your lung could get pierced when the PICC is inserted  This can cause a collapsed lung      What healthcare providers will teach you about the PICC:   · Supplies you need to keep on hand to use, care for, and flush the PICC    · How to use the PICC, and when to keep it clamped    · How and when to flush and care for the PICC    · Problems that may develop, such as a hole in the catheter, and what to do to fix the problems    · How to bathe and do your daily activities with a PICC in place    · How to prevent infections    · Signs and symptoms of an infection to watch for and what to do if an infection develops    Call your local emergency number (911 in the [address]) for any of the following:   · You feel pain in your arm, neck, shoulder, or chest     · You cough up blood  Seek care immediately if:   · The catheter site turns cold, changes color, or you cannot feel it  · You see blood on your dressing and the amount is increasing  · The veins in your neck or chest bulge  Call your doctor if:   · You see signs of infection, such as redness, swelling, or pus, or you have a fever  · The catheter site is red, warm, painful, or oozing fluid  · You see blisters on the skin near the catheter site  · You cannot flush your catheter, or you feel pain when you flush your catheter  · You see that the catheter is getting shorter, or it falls out  Put pressure on the site with a clean towel before you call your doctor  · You see a hole or a crack in your catheter  Clamp your catheter above the damage before you call your doctor  · You have questions or concerns about your catheter  Medicines:   · NSAIDs  help decrease swelling and pain or fever  This medicine is available with or without a doctor's order  NSAIDs can cause stomach bleeding or kidney problems in certain people  If you take blood thinner medicine, always ask your healthcare provider if NSAIDs are safe for you  Always read the medicine label and follow directions  · Take your medicine as directed  Contact your healthcare provider if you think your medicine is not helping or if you have side effects  Tell him or her if you are allergic to any medicine  Keep a list of the medicines, vitamins, and herbs you take   Include the amounts, and when and why you take them  Bring the list or the pill bottles to follow-up visits  Carry your medicine list with you in case of an emergency  Self-care:   · Plan to rest when you get home  You should be able to go back to your normal activities the next day  Your healthcare provider will tell you which activities are okay for you  · Apply a warm compress as directed  The area where the catheter was inserted may feel sore  A warm compress can help to decrease pain and swelling in your arm  Wet a small towel with warm water  Wring out the extra water  Wrap the cloth in plastic, and put it on the area  Use the compress 4 times a day, for 10 minutes each time  Prop your arm up on pillows when you are sitting or lying down  This will decrease swelling  · Follow instructions on how to care for your insertion site  Your provider will tell you when it is okay to shower or bathe  This is usually after about 1 week  You will need to keep the area covered so it stays dry when you bathe  Prevent an infection:  The area around your catheter may get infected, or you may get an infection in your bloodstream  A bloodstream infection is called a central line-associated bloodstream infection (CLABSI)  A CLABSI is caused by bacteria getting into your bloodstream through your catheter  This can lead to severe illness  The following are ways you can help prevent an infection:  · Wash your hands often  Use soap or an alcohol-based hand rub to clean your hands  Clean your hands before and after you touch the catheter or the catheter site  Remind anyone who cares for your catheter to wash his or her hands  · Limit contact with the catheter  Do not touch or handle your catheter unless you need to care for it  Do not pull, push on, or move the catheter when you clean your skin or change the dressing  Wear clean medical gloves when you touch your catheter or change dressings  · Clean your skin as directed    Clean the skin around your catheter every day and before you change your dressing  Ask your healthcare provider what to use to clean your skin  Check your skin every day for signs of infection, such as pain, redness, swelling, and oozing  Contact your healthcare provider if you see these signs  · Change the dressing as directed  Keep a sterile dressing over the catheter site  Change the dressing as directed or when it is loose, wet, dirty, or falls off  Clean the skin under the dressing with the solution your healthcare provider suggests  Let the area dry before you put on the new dressing  Do not  blow on your skin to help it dry  · Keep the area dry  Wrap your arm with plastic and seal it with medical tape before you bathe  Take showers instead of baths  Do not swim or soak in a hot tub  Follow up with your doctor as directed:  Write down your questions so you remember to ask them during your visits  © [employer] 2021 Information is for End User's use only and may not be sold, redistributed or otherwise used for commercial purposes  All illustrations and images included in CareNotes® are the copyrighted property of A D A M , Inc  or [facility] Estevez   The above information is an  only  It is not intended as medical advice for individual conditions or treatments  Talk to your doctor, nurse or pharmacist before following any medical regimen to see if it is safe and effective for you

## 2021-09-13 NOTE — ASSESSMENT & PLAN NOTE
· Stage IV left gluteal decubitus present on admission  · Debridement and management as per general surgery  · Case management is working on securing a wound VAC for the patient to go home with

## 2021-09-13 NOTE — CONSULTS
Vancomycin IV Pharmacy-To-Dose Consultation:    Sathya Angeles is a 49 y o  male who is currently receiving Vancomycin IV with management by the Pharmacy Consult service for the treatment of skin-soft tissue infection, MRSA confirmed  Assessment/Plan:    The patient's chart was reviewed  Renal function is stable  There are no signs or symptoms of nephrotoxicity and/or infusion reactions documented  Based on today's assessment, will continue current vancomycin (Day # 9) dosing of 1500 mg IV Q 8 Hrs, with a plan for a trough to be drawn at 0100 on 9/14/21  We will continue to follow the patient's culture results and clinical progress daily      Hector Maddox, Pharmacist

## 2021-09-13 NOTE — DISCHARGE INSTR - AVS FIRST PAGE
Wound Vac Settings for Discharge    Pressure:  125  Suction:   Continuous  Sponge Color:  Black       Dear Meme Whitman,     It was our pleasure to care for you here at [facility]  It is our hope that we were always able to exceed the expected standards for your care during your stay  You were hospitalized due to sepsis and a left gluteal wound  You were cared for on the medical/surgical floor by Ronla Pizarro MD with the [facility]ist [facility] who covers for your primary care physician (PCP), Kate Prescott PA-C, while you were hospitalized  You were additionally seen by the [address] [place] [address] surgical associates-Dr Tor Garrison, you were also seen by [facility] infectious disease services-Dr Ronie Collet  If you have any questions or concerns related to this hospitalization, you may contact us at [phone]  For follow up as well as any medication refills, we recommend that you follow up with your primary care physician  A registered nurse will reach out to you by phone within a few days after your discharge to answer any additional questions that you may have after going home    However, at this time we provide for you here, the most important instructions / recommendations at discharge:     · Notable Medication Adjustments -   · New prescription vancomycin 1500 mg IV-take every 8 hours last dose is on September 22, 2021  · Okay to resume all other pre-admit meds at pre-admit dosages  · Testing Required after Discharge -   · While receiving the antibiotics, he will need weekly CMPs, CBC with differentials, and vancomycin trough testing  · This blood work should be ordered by your primary care provider and or Infectious Disease  · Important follow up information -   · Please follow-up with the providers as outlined in this discharge package  · Other Instructions -   · Please maintain a healthy diet  · Please review this entire after visit summary as additional general instructions including medication list, appointments, activity, diet, any pertinent wound care, and other additional recommendations from your care team that may be provided for you        Sincerely,     Jaswinder Vaughn MD

## 2021-09-13 NOTE — QUICK NOTE
Erlin Rivas is a 50 yo M admitted with infected stage IV decubitus ulcer of the left ischium for which wound VAC therapy is now indicated to promote granulation tissue formation and wound healing

## 2021-09-13 NOTE — PROCEDURES
Procedure Note - General Surgery   Augusto Daniels 49 y o  male MRN: [id number]  Unit/Bed#: -02 Encounter: [id number]    Preoperative diagnosis: stage IV decubitus ulcer L ischium    Post operative diagnosis: stage IV decubitus ulcer L ischium    Procedure:  VAC application    Surgeon:  Omi Patel PA-C    Assistant:  none    Anesthesia:  none    Estimated blood loss:  none    Complications:  none    Specimens:  none    Indications for procedure: 48 yo M with infected decubitus ulcer for which VAC therapy now indicated    Operative findings:  Wound clean, open, granulating, 6 cm by 8 cm by 0 5 cm, mild slough  Operative technique: Positioned right lateral decubitus  Wound cleansed, skin prep applied  Sponge cut to size  Under drape placed  Foam and over drape applied  Bridge applied to left hip  Suction applied with good seal, no leak  Tolerated well  No bleeding      Signature:  Lilian Patel PA-C  Date: 9/13/2021 Time: 3:20 PM

## 2021-09-13 NOTE — PROGRESS NOTES
[facility]  Progress Note Yo Barton 1965, 49 y o  male MRN: [id number]  Unit/Bed#: -02 Encounter: [id number]  Primary Care Provider: Samreen Pacheco PA-C   Date and time admitted to hospital: 9/5/2021  5:40 PM    * Sepsis [facility])  Assessment & Plan  · Secondary to left gluteal abscess, febrile and tachycardic on admission  · 1/2 blood cultures positive for MRSA  · Status post an ID evaluation  · Status post a completed course of p o  Cefdinir on 09/12/2021  · Patient have a PICC line placed today  · Patient will go home eventually on IV vancomycin through September 22, 2021  · Hopeful discharge planning in the next 24 hours    Bacteremia due to Gram-positive bacteria  Assessment & Plan  · Blood cultures 1 of 2 positive for MRSA  · Continue management as outlined above    Gluteal abscess  Assessment & Plan  · Stage IV left gluteal decubitus present on admission  · Debridement and management as per general surgery  · Case management is working on securing a wound VAC for the patient to go home with    Essential hypertension  Assessment & Plan  · Blood pressure stable  · Continue lisinopril    Generalized convulsive epilepsy ([facility] [address] )  Assessment & Plan  · Continue pre-hospital Keppra 1500 mg p o  B i d , Depakote  mg p o  Daily and 1000 mg p o  Q h s , Klonopin 0 25 mg p o  B i d  and Vimpat 200 mg p o  B i d     Acquired hypothyroidism  Assessment & Plan  · Continue pre-hospital levothyroxine 125 mcg p o   Daily    Hyperlipidemia  Assessment & Plan  · Continue pravastatin    Metabolic encephalopathy  Assessment & Plan  · Likely secondary to sepsis/bacteremia  · Mental status appears to be back at baseline  · Will continue supportive care    Hyponatremia  Assessment & Plan  · Hypovolemic hyponatremia which resolved with IV fluids    Hypomagnesemia  Assessment & Plan  · Repleted  · Will recheck    Type 2 diabetes mellitus without complication, without long-term current use of insulin (HCC)  Assessment & Plan  · Continue Accu-Cheks AC and HS with sliding scale coverage    Vitamin D deficiency  Assessment & Plan  Continue pre-hospital vitamin D3 1000 units p o  Daily        VTE Prophylaxis:  Heparin    Patient Centered Rounds: I have performed bedside rounds with nursing staff today  Discussions with Specialists or Other Care Team Provider:  General surgery, case management, nursing, pharmacy  Education and Discussions with Family / Patient:  Patient's family member Windy Waterman was bedside at the time of my evaluation, all questions answered to her satisfaction    Current Length of Stay: 8 day(s)    Current Patient Status: Inpatient   Certification Statement: The patient will continue to require additional inpatient hospital stay due to The need for IV antibiotics    Discharge Plan:  Hopeful discharge planning over the next 24-48 hours    Code Status: Level 1 - Full Code    Subjective:   Patient seen and examined, resting in bed, is comfortable, denies having any new complaints    Objective:     Vitals:   Temp (24hrs), Av 5 °F (36 4 °C), Min:97 °F (36 1 °C), Max:97 9 °F (36 6 °C)    Temp:  [97 °F (36 1 °C)-97 9 °F (36 6 °C)] 97 6 °F (36 4 °C)  HR:  [77-95] 95  Resp:  [18] 18  BP: (111-136)/(56-69) 119/56  SpO2:  [94 %-96 %] 96 %  Body mass index is 22 24 kg/m²  Input and Output Summary (last 24 hours): Intake/Output Summary (Last 24 hours) at 2021 1253  Last data filed at 2021 2236  Gross per 24 hour   Intake 450 ml   Output 200 ml   Net 250 ml       Physical Exam:   Physical Exam  Vitals and nursing note reviewed  Constitutional:       General: He is not in acute distress  Appearance: Normal appearance  He is not ill-appearing  HENT:      Head: Normocephalic and atraumatic  Nose: Nose normal    Eyes:      Extraocular Movements: Extraocular movements intact  Pupils: Pupils are equal, round, and reactive to light     Cardiovascular:      Rate and Rhythm: Normal rate and regular rhythm  Pulses: Normal pulses  Heart sounds: Normal heart sounds  No murmur heard  No friction rub  No gallop  Pulmonary:      Effort: Pulmonary effort is normal       Breath sounds: Normal breath sounds  Abdominal:      General: There is no distension  Palpations: Abdomen is soft  There is no mass  Tenderness: There is no abdominal tenderness  There is no guarding or rebound  Musculoskeletal:         General: No swelling or tenderness  Normal range of motion  Cervical back: Normal range of motion and neck supple  No rigidity  No muscular tenderness  Right lower leg: No edema  Left lower leg: No edema  Skin:     General: Skin is warm  Capillary Refill: Capillary refill takes less than 2 seconds  Findings: No erythema or rash  Comments: Left gluteus bree decubitus ulcer noted stage IV with clean edges   Neurological:      General: No focal deficit present  Mental Status: He is alert and oriented to person, place, and time  Mental status is at baseline     Psychiatric:         Mood and Affect: Mood normal          Behavior: Behavior normal          Additional Data:     Labs:    Results from last 7 days   Lab Units 09/13/21  0542 09/12/21  0559   WBC Thousand/uL 9 90 9 60   HEMOGLOBIN g/dL 11 5* 9 7*   HEMATOCRIT % 33 8* 28 5*   PLATELETS Thousands/uL 451* 382   NEUTROS PCT %  --  55   LYMPHS PCT %  --  29   MONOS PCT %  --  13*   EOS PCT %  --  2     Results from last 7 days   Lab Units 09/13/21  0542   SODIUM mmol/L 136   POTASSIUM mmol/L 4 2   CHLORIDE mmol/L 101   CO2 mmol/L 28   BUN mg/dL 19   CREATININE mg/dL 0 49*   CALCIUM mg/dL 9 2         Results from last 7 days   Lab Units 09/13/21  1110 09/13/21  0539 09/12/21  2107 09/12/21  1533 09/12/21  1200 09/12/21  0626 09/11/21 2016 09/11/21  1538 09/11/21  1131 09/11/21  0548 09/10/21  2054 09/10/21  1551   POC GLUCOSE mg/dl 118 117 125 134 188* 116 138 141* 142* 93 199* 151*           * I Have Reviewed All Lab Data Listed Above  * Additional Pertinent Lab Tests Reviewed: Daisy [address] Admission  Reviewed    Imaging:  Imaging Reports Reviewed Today Include:  None    Recent Cultures (last 7 days):     Results from last 7 days   Lab Units 09/08/21  0516   BLOOD CULTURE  No Growth After 5 Days  No Growth After 5 Days         Last 24 Hours Medication List:   Current Facility-Administered Medications   Medication Dose Route Frequency Provider Last Rate    benzonatate  200 mg Oral TID PRN Lorfarnaz Arriaga PA-C      benztropine  0 5 mg Oral BID Lorence JAIR Arriaga      calcium carbonate  1 tablet Oral TID With Meals Lorfarnaz Arriaga PA-C      cefdinir  300 mg Oral Q12H Chinedu Wolf MD      cholecalciferol  1,000 Units Oral Daily Lorfarnaz Arriaga PA-C      clonazePAM  0 25 mg Oral BID PRN Lorfarnaz Arriaga PA-C      vitamin B-12  1,000 mcg Oral Daily Lorence JAIR Arriaga      divalproex sodium  1,000 mg Oral HS Lorence JAIR Arriaga      divalproex sodium  500 mg Oral Daily Lorence JAIR Arriaga      gabapentin  100 mg Oral TID Lorence JAIR Arriaga      heparin (porcine)  5,000 Units Subcutaneous [facility] Polly Gordillo MD      hydrOXYzine HCL  50 mg Oral TID Lorfarnaz Arriaga PA-C      insulin lis[facility]  1[facility]5 Units [facility]cutaneous [facility]      insulin lispro  2-12 Units Subcutaneous TID [facility]      lacosamide  200 mg Oral Q12H [facility] Lorfarnaz Arriaga PA-C      levETIRAcetam  1,500 mg Oral BID Lorence JAIR Arriaga      levothyroxine  125 mcg Oral Early Morning Lorence JAIR Arriaga      lisinopril  10 mg Oral Daily Lorence JAIR Arriaga      loratadine  10 mg Oral Daily Lorence JAIR Arriaga      polyethylene glycol  17 g Oral Daily Lorence JAIR Arriaga      pravastatin  80 mg Oral Daily With Pat StabsJAIR      QUEtiapine  200 mg Oral BID (AM & Afternoon) Lorence JAIR Arriaga      QUEtiapine  400 mg Oral HS Le Briggs PA-C      temazepam  15 mg Oral HS Le Briggs PA-C      vancomycin  1,500 mg Intravenous Q8H Isi Valdivia MD 1,500 mg (09/13/21 1055)        Today, Patient Was Seen By: Lady Palmer MD    ** Please Note: Dictation voice to text software may have been used in the creation of this document   **

## 2021-09-14 LAB
ALBUMIN SERPL BCP-MCNC: 3.2 G/DL (ref 3.5–5.7)
ALP SERPL-CCNC: 69 U/L (ref 40–150)
ALT SERPL W P-5'-P-CCNC: 66 U/L (ref 7–52)
ANION GAP SERPL CALCULATED.3IONS-SCNC: 5 MMOL/L (ref 4–13)
AST SERPL W P-5'-P-CCNC: 27 U/L (ref 13–39)
BILIRUB SERPL-MCNC: 0.2 MG/DL (ref 0.2–1)
BUN SERPL-MCNC: 19 MG/DL (ref 7–25)
CALCIUM ALBUM COR SERPL-MCNC: 9.8 MG/DL (ref 8.3–10.1)
CALCIUM SERPL-MCNC: 9.2 MG/DL (ref 8.6–10.5)
CHLORIDE SERPL-SCNC: 103 MMOL/L (ref 98–107)
CO2 SERPL-SCNC: 29 MMOL/L (ref 21–31)
CREAT SERPL-MCNC: 0.51 MG/DL (ref 0.7–1.3)
ERYTHROCYTE [DISTWIDTH] IN BLOOD BY AUTOMATED COUNT: 14 % (ref 11.5–14.5)
GFR SERPL CREATININE-BSD FRML MDRD: 122 ML/MIN/1.73SQ M
GLUCOSE SERPL-MCNC: 103 MG/DL (ref 65–99)
GLUCOSE SERPL-MCNC: 119 MG/DL (ref 65–140)
GLUCOSE SERPL-MCNC: 134 MG/DL (ref 65–140)
GLUCOSE SERPL-MCNC: 90 MG/DL (ref 65–140)
GLUCOSE SERPL-MCNC: 99 MG/DL (ref 65–140)
HCT VFR BLD AUTO: 31.8 % (ref 42–47)
HGB BLD-MCNC: 10.4 G/DL (ref 14–18)
LYMPHOCYTES # BLD AUTO: 3.06 THOUSAND/UL (ref 0.6–4.47)
LYMPHOCYTES # BLD AUTO: 30 % (ref 20–51)
MAGNESIUM SERPL-MCNC: 1.3 MG/DL (ref 1.9–2.7)
MCH RBC QN AUTO: 31.6 PG (ref 26–34)
MCHC RBC AUTO-ENTMCNC: 32.8 G/DL (ref 31–37)
MCV RBC AUTO: 96 FL (ref 81–99)
MONOCYTES # BLD AUTO: 1.02 THOUSAND/UL (ref 0–1.22)
MONOCYTES NFR BLD AUTO: 10 % (ref 4–12)
NEUTS SEG # BLD: 6.12 THOUSAND/UL (ref 1.81–6.82)
NEUTS SEG NFR BLD AUTO: 60 % (ref 42–75)
PHOSPHATE SERPL-MCNC: 4.2 MG/DL (ref 3–5.5)
PLATELET # BLD AUTO: 421 THOUSANDS/UL (ref 149–390)
PLATELET BLD QL SMEAR: ADEQUATE
PMV BLD AUTO: 7.7 FL (ref 8.6–11.7)
POTASSIUM SERPL-SCNC: 4.4 MMOL/L (ref 3.5–5.5)
PROT SERPL-MCNC: 6.5 G/DL (ref 6.4–8.9)
RBC # BLD AUTO: 3.3 MILLION/UL (ref 4.3–5.9)
RBC MORPH BLD: NORMAL
SODIUM SERPL-SCNC: 137 MMOL/L (ref 134–143)
TOTAL CELLS COUNTED SPEC: 100
VANCOMYCIN TROUGH SERPL-MCNC: 18 UG/ML (ref 5–12)
WBC # BLD AUTO: 10.2 THOUSAND/UL (ref 4.8–10.8)

## 2021-09-14 PROCEDURE — 85007 BL SMEAR W/DIFF WBC COUNT: CPT | Performed by: HOSPITALIST

## 2021-09-14 PROCEDURE — 80053 COMPREHEN METABOLIC PANEL: CPT | Performed by: HOSPITALIST

## 2021-09-14 PROCEDURE — 84100 ASSAY OF PHOSPHORUS: CPT | Performed by: HOSPITALIST

## 2021-09-14 PROCEDURE — 80202 ASSAY OF VANCOMYCIN: CPT | Performed by: INTERNAL MEDICINE

## 2021-09-14 PROCEDURE — 85027 COMPLETE CBC AUTOMATED: CPT | Performed by: HOSPITALIST

## 2021-09-14 PROCEDURE — 82948 REAGENT STRIP/BLOOD GLUCOSE: CPT

## 2021-09-14 PROCEDURE — 99232 SBSQ HOSP IP/OBS MODERATE 35: CPT | Performed by: HOSPITALIST

## 2021-09-14 PROCEDURE — 83735 ASSAY OF MAGNESIUM: CPT | Performed by: HOSPITALIST

## 2021-09-14 RX ORDER — MAGNESIUM SULFATE HEPTAHYDRATE 40 MG/ML
4 INJECTION, SOLUTION INTRAVENOUS ONCE
Status: COMPLETED | OUTPATIENT
Start: 2021-09-14 | End: 2021-09-15

## 2021-09-14 RX ADMIN — HYDROXYZINE HYDROCHLORIDE 50 MG: 25 TABLET ORAL at 09:51

## 2021-09-14 RX ADMIN — LISINOPRIL 10 MG: 10 TABLET ORAL at 09:50

## 2021-09-14 RX ADMIN — HEPARIN SODIUM 5000 UNITS: 5000 INJECTION INTRAVENOUS; SUBCUTANEOUS at 14:06

## 2021-09-14 RX ADMIN — LEVOTHYROXINE SODIUM 125 MCG: 25 TABLET ORAL at 05:19

## 2021-09-14 RX ADMIN — BENZTROPINE MESYLATE 0.5 MG: 0.5 TABLET ORAL at 09:51

## 2021-09-14 RX ADMIN — LACOSAMIDE 200 MG: 150 TABLET, FILM COATED ORAL at 09:51

## 2021-09-14 RX ADMIN — GABAPENTIN 100 MG: 100 CAPSULE ORAL at 15:50

## 2021-09-14 RX ADMIN — LACOSAMIDE 200 MG: 150 TABLET, FILM COATED ORAL at 21:50

## 2021-09-14 RX ADMIN — CALCIUM 1 TABLET: 500 TABLET ORAL at 09:52

## 2021-09-14 RX ADMIN — QUETIAPINE FUMARATE 200 MG: 50 TABLET, EXTENDED RELEASE ORAL at 09:50

## 2021-09-14 RX ADMIN — CALCIUM 1 TABLET: 500 TABLET ORAL at 15:50

## 2021-09-14 RX ADMIN — PRAVASTATIN SODIUM 80 MG: 40 TABLET ORAL at 15:50

## 2021-09-14 RX ADMIN — LORATADINE 10 MG: 10 TABLET ORAL at 09:51

## 2021-09-14 RX ADMIN — CALCIUM 1 TABLET: 500 TABLET ORAL at 14:05

## 2021-09-14 RX ADMIN — BENZTROPINE MESYLATE 0.5 MG: 0.5 TABLET ORAL at 18:11

## 2021-09-14 RX ADMIN — QUETIAPINE FUMARATE 200 MG: 50 TABLET, EXTENDED RELEASE ORAL at 14:05

## 2021-09-14 RX ADMIN — DIVALPROEX SODIUM 1000 MG: 500 TABLET, EXTENDED RELEASE ORAL at 21:51

## 2021-09-14 RX ADMIN — VANCOMYCIN HYDROCHLORIDE 1500 MG: 1 INJECTION, POWDER, LYOPHILIZED, FOR SOLUTION INTRAVENOUS at 01:39

## 2021-09-14 RX ADMIN — HEPARIN SODIUM 5000 UNITS: 5000 INJECTION INTRAVENOUS; SUBCUTANEOUS at 21:48

## 2021-09-14 RX ADMIN — HYDROXYZINE HYDROCHLORIDE 50 MG: 25 TABLET ORAL at 21:50

## 2021-09-14 RX ADMIN — DIVALPROEX SODIUM 500 MG: 500 TABLET, EXTENDED RELEASE ORAL at 09:49

## 2021-09-14 RX ADMIN — GABAPENTIN 100 MG: 100 CAPSULE ORAL at 09:51

## 2021-09-14 RX ADMIN — GABAPENTIN 100 MG: 100 CAPSULE ORAL at 21:51

## 2021-09-14 RX ADMIN — LEVETIRACETAM 1500 MG: 500 TABLET, FILM COATED ORAL at 09:49

## 2021-09-14 RX ADMIN — Medication 1000 UNITS: at 09:51

## 2021-09-14 RX ADMIN — HYDROXYZINE HYDROCHLORIDE 50 MG: 25 TABLET ORAL at 15:50

## 2021-09-14 RX ADMIN — LEVETIRACETAM 1500 MG: 500 TABLET, FILM COATED ORAL at 18:11

## 2021-09-14 RX ADMIN — VANCOMYCIN HYDROCHLORIDE 1500 MG: 1 INJECTION, POWDER, LYOPHILIZED, FOR SOLUTION INTRAVENOUS at 18:48

## 2021-09-14 RX ADMIN — QUETIAPINE FUMARATE 400 MG: 50 TABLET, EXTENDED RELEASE ORAL at 21:51

## 2021-09-14 RX ADMIN — VANCOMYCIN HYDROCHLORIDE 1500 MG: 1 INJECTION, POWDER, LYOPHILIZED, FOR SOLUTION INTRAVENOUS at 10:35

## 2021-09-14 RX ADMIN — POLYETHYLENE GLYCOL 3350 17 G: 17 POWDER, FOR SOLUTION ORAL at 09:51

## 2021-09-14 RX ADMIN — MAGNESIUM SULFATE HEPTAHYDRATE 4 G: 40 INJECTION, SOLUTION INTRAVENOUS at 15:49

## 2021-09-14 RX ADMIN — CYANOCOBALAMIN TAB 500 MCG 1000 MCG: 500 TAB at 09:51

## 2021-09-14 RX ADMIN — TEMAZEPAM 15 MG: 15 CAPSULE ORAL at 21:51

## 2021-09-14 RX ADMIN — HEPARIN SODIUM 5000 UNITS: 5000 INJECTION INTRAVENOUS; SUBCUTANEOUS at 05:19

## 2021-09-14 NOTE — ASSESSMENT & PLAN NOTE
· Patient had a stage IV large abscessed decubitus ulcer of left ischium/buttock with full thickness skin necrosis that was present on admissionia    · Patient is 8 days out from initial incision and drainage with multiple subsequent bedside debridements by General surgery  · Wound VAC is now in place  · No further inpatient surgical workup is needed  · Discharge planning for tomorrow, outpatient surgery follow-up thereafter

## 2021-09-14 NOTE — PROGRESS NOTES
[facility]  Progress Note [place] Jean Baptiste 1965, 49 y o  male MRN: [id number]  Unit/Bed#: -02 Encounter: [id number]  Primary Care Provider: Coni Ledbetter PA-C   Date and time admitted to hospital: 9/5/2021  5:40 PM    * Sepsis [facility])  Assessment & Plan  · Secondary to left gluteal abscess, febrile and tachycardic on admission  · All sepsis parameters have since resolved  · 1/2 blood cultures positive for MRSA  · Status post an ID evaluation  · Status post a completed course of p o  Cefdinir on 09/12/2021  · Status post a PICC line placement 09/13/2020  · Patient is now medically stable for discharge  · Patient to go home on IV vancomycin through 09/22/2021  · Case management is setting up a home nurse for antibiotic administration  · Discharge planning for tomorrow 09/15/2021    Bacteremia due to Gram-positive bacteria  Assessment & Plan  · Blood cultures 1 of 2 positive for MRSA  · Continue management as outlined above    Gluteal abscess  Assessment & Plan  · Patient had a stage IV large abscessed decubitus ulcer of left ischium/buttock with full thickness skin necrosis that was present on admissionia  · Patient is 8 days out from initial incision and drainage with multiple subsequent bedside debridements by General surgery  · Wound VAC is now in place  · No further inpatient surgical workup is needed  · Discharge planning for tomorrow, outpatient surgery follow-up thereafter    Essential hypertension  Assessment & Plan  · Blood pressure stable  · Continue lisinopril    Generalized convulsive epilepsy ([facility] [address] )  Assessment & Plan  · Continue pre-hospital Keppra 1500 mg p o  B i d , Depakote  mg p o  Daily and 1000 mg p o  Q h s , Klonopin 0 25 mg p o  B i d  and Vimpat 200 mg p o  B i d     Acquired hypothyroidism  Assessment & Plan  · Continue pre-hospital levothyroxine 125 mcg p o   Daily    Hyperlipidemia  Assessment & Plan  · Continue pravastatin    Metabolic encephalopathy  Assessment & Plan  · Likely secondary to sepsis/bacteremia  · Mental status appears to be back at baseline  · Will continue supportive care    Hyponatremia  Assessment & Plan  · Hypovolemic hyponatremia which resolved with IV fluids    Hypomagnesemia  Assessment & Plan  · Magnesium is low once again despite repletion  · Will replete IV and recheck    Type 2 diabetes mellitus without complication, without long-term current use of insulin (HCC)  Assessment & Plan  · Continue Accu-Cheks AC and HS with sliding scale coverage    Vitamin D deficiency  Assessment & Plan  Continue pre-hospital vitamin D3 1000 units p o  Daily        VTE Prophylaxis:  Heparin    Patient Centered Rounds: I have performed bedside rounds with nursing staff today  Discussions with Specialists or Other Care Team Provider:  General surgery, case management, nursing, pharmacy  Education and Discussions with Family / Patient:  The patient's caregiver Dedrick Gold was bedside at the time of my rounding evaluation, all questions answered to her satisfaction    Current Length of Stay: 9 day(s)    Current Patient Status: Inpatient   Certification Statement: The patient will continue to require additional inpatient hospital stay due to The need for IV magnesium, and IV antibiotics    Discharge Plan:  Hopeful discharge planning for tomorrow    Code Status: Level 1 - Full Code    Subjective:   Patient seen and examined, resting in bed, appears comfortable    Objective:     Vitals:   Temp (24hrs), Av 6 °F (36 4 °C), Min:97 6 °F (36 4 °C), Max:97 7 °F (36 5 °C)    Temp:  [97 6 °F (36 4 °C)-97 7 °F (36 5 °C)] 97 6 °F (36 4 °C)  HR:  [72-98] 72  Resp:  [18] 18  BP: (123-138)/(69-74) 123/69  SpO2:  [92 %-96 %] 92 %  Body mass index is 22 24 kg/m²  Input and Output Summary (last 24 hours):        Intake/Output Summary (Last 24 hours) at 2021 1448  Last data filed at 2021 0612  Gross per 24 hour   Intake --   Output 300 ml   Net -300 ml       Physical Exam:   Physical Exam  Vitals and nursing note reviewed  Constitutional:       General: He is not in acute distress  Appearance: Normal appearance  He is not ill-appearing  HENT:      Head: Normocephalic and atraumatic  Nose: Nose normal    Eyes:      Extraocular Movements: Extraocular movements intact  Pupils: Pupils are equal, round, and reactive to light  Cardiovascular:      Rate and Rhythm: Normal rate and regular rhythm  Pulses: Normal pulses  Heart sounds: Normal heart sounds  No murmur heard  No friction rub  No gallop  Pulmonary:      Effort: Pulmonary effort is normal       Breath sounds: Normal breath sounds  Abdominal:      General: There is no distension  Palpations: Abdomen is soft  There is no mass  Tenderness: There is no abdominal tenderness  There is no guarding or rebound  Musculoskeletal:         General: No swelling or tenderness  Normal range of motion  Cervical back: Normal range of motion and neck supple  No rigidity  No muscular tenderness  Right lower leg: No edema  Left lower leg: No edema  Skin:     General: Skin is warm  Capillary Refill: Capillary refill takes less than 2 seconds  Findings: No erythema or rash  Comments: Left gluteal wound VAC is in place   Neurological:      General: No focal deficit present  Mental Status: He is alert  Mental status is at baseline     Psychiatric:         Mood and Affect: Mood normal          Behavior: Behavior normal          Additional Data:     Labs:    Results from last 7 days   Lab Units 09/14/21  0512 09/12/21  0559   WBC Thousand/uL 10 20 9 60   HEMOGLOBIN g/dL 10 4* 9 7*   HEMATOCRIT % 31 8* 28 5*   PLATELETS Thousands/uL 421* 382   NEUTROS PCT %  --  55   LYMPHS PCT %  --  29   LYMPHO PCT % 30  --    MONOS PCT %  --  13*   MONO PCT % 10  --    EOS PCT %  --  2     Results from last 7 days   Lab Units 09/14/21  0512   SODIUM mmol/L 137 POTASSIUM mmol/L 4 4   CHLORIDE mmol/L 103   CO2 mmol/L 29   BUN mg/dL 19   CREATININE mg/dL 0 51*   CALCIUM mg/dL 9 2   ALK PHOS U/L 69   ALT U/L 66*   AST U/L 27         Results from last 7 days   Lab Units 09/14/21  1057 09/14/21  0524 09/13/21 2008 09/13/21  1616 09/13/21  1110 09/13/21  0539 09/12/21  2107 09/12/21  1533 09/12/21  1200 09/12/21  0626 09/11/21 2016 09/11/21  1538   POC GLUCOSE mg/dl 90 119 131 139 118 117 125 134 188* 116 138 141*           * I Have Reviewed All Lab Data Listed Above  * Additional Pertinent Lab Tests Reviewed: [address] Admission  Reviewed    Imaging:  Imaging Reports Reviewed Today Include:  None    Recent Cultures (last 7 days):     Results from last 7 days   Lab Units 09/08/21  0516   BLOOD CULTURE  No Growth After 5 Days  No Growth After 5 Days         Last 24 Hours Medication List:   Current Facility-Administered Medications   Medication Dose Route Frequency Provider Last Rate    benzonatate  200 mg Oral TID PRN Aisha Rae PA-C      benztropine  0 5 mg Oral BID Aisha Rae PA-C      calcium carbonate  1 tablet Oral TID With Meals Aisha Rae PA-C      cholecalciferol  1,000 Units Oral Daily Aisha Rae PA-C      clonazePAM  0 25 mg Oral BID PRN Aisha Rae PA-C      vitamin B-12  1,000 mcg Oral Daily Aisha Rae PA-C      divalproex sodium  1,000 mg Oral HS Aisha Rae PA-C      divalproex sodium  500 mg Oral Daily Aisha Rae PA-C      gabapentin  100 mg Oral TID Aisha Rae PA-C      heparin (porcine)  5,000 Units Subcutaneous [facility] Amador Germain MD      hydrOXYzine HCL  50 mg Oral TID Aisha Rae PA-C      insulin lispro  1-5 Units Subcutaneous HS [place] PA-C      insulin lispro  2-12 Units Subcutaneous TID AC [place] Ball[place] PA-C      lacosamide  200 mg Oral Q12H [facility] Aisha Rae PA-C      levETIRAcetam  1,500 mg Oral BID Aisha Rae PA-C      levothyroxine  125 mcg Oral Early Morning Clemencia Hamburgrenay, PA-DENIA      lisinopril  10 mg Oral Daily Clemenciay Hamburg, PA-DENIA      loratadine  10 mg Oral Daily Clemenciay Hamburg, PA-DENIA      polyethylene glycol  17 g Oral Daily Clemenciay Hamburgrenay, PA-DENIA      pravastatin  80 mg Oral Daily With [facility], PA-DENIA      QUEtiapine  200 mg Oral BID (AM & Afternoon) Clemencia Hamburger, PA-DENIA      QUEtiapine  400 mg Oral HS Clemenciay Hamburger, PA-DENIA      temazepam  15 mg Oral HS Clemenciay Hamburgrenay, JAIR      vancomycin  1,500 mg Intravenous Q8H Joel Hull MD 1,500 mg (09/14/21 9019)        Today, Patient Was Seen By: Larry Hernandez MD    ** Please Note: Dictation voice to text software may have been used in the creation of this document   **

## 2021-09-14 NOTE — ASSESSMENT & PLAN NOTE
· Secondary to left gluteal abscess, febrile and tachycardic on admission  · All sepsis parameters have since resolved  · 1/2 blood cultures positive for MRSA  · Status post an ID evaluation  · Status post a completed course of p o   Cefdinir on 09/12/2021  · Status post a PICC line placement 09/13/2020  · Patient is now medically stable for discharge  · Patient to go home on IV vancomycin through 09/22/2021  · Case management is setting up a home nurse for antibiotic administration  · Discharge planning for tomorrow 09/15/2021

## 2021-09-14 NOTE — PLAN OF CARE
Problem: MOBILITY - ADULT  Goal: Maintain or return to baseline ADL function  Description: INTERVENTIONS:  -  Assess patient's ability to carry out ADLs; assess patient's baseline for ADL function and identify physical deficits which impact ability to perform ADLs (bathing, care of mouth/teeth, toileting, grooming, dressing, etc )  - Assess/evaluate cause of self-care deficits   - Assess range of motion  - Assess patient's mobility; develop plan if impaired  - Assess patient's need for assistive devices and provide as appropriate  - Encourage maximum independence but intervene and supervise when necessary  - Involve family in performance of ADLs  - Assess for home care needs following discharge   - Consider OT consult to assist with ADL evaluation and planning for discharge  - Provide patient education as appropriate  Outcome: Progressing  Goal: Maintains/Returns to pre admission functional level  Description: INTERVENTIONS:  - Perform BMAT or MOVE assessment daily    - Set and communicate daily mobility goal to care team and patient/family/caregiver  - Collaborate with rehabilitation services on mobility goals if consulted  - Perform Range of Motion 4 times a day  - Reposition patient every 2 hours    - Dangle patient 2 times a day  - Stand patient 2 times a day  - Ambulate patient 2 times a day  - Out of bed to chair 3 times a day   - Out of bed for meals 3 times a day  - Out of bed for toileting  - Record patient progress and toleration of activity level   Outcome: Progressing     Problem: Potential for Falls  Goal: Patient will remain free of falls  Description: INTERVENTIONS:  - Educate patient/family on patient safety including physical limitations  - Instruct patient to call for assistance with activity   - Consult OT/PT to assist with strengthening/mobility   - Keep Call bell within reach  - Keep bed low and locked with side rails adjusted as appropriate  - Keep care items and personal belongings within reach  - Initiate and maintain comfort rounds  - Make Fall Risk Sign visible to staff  - Offer Toileting every 2 Hours, in advance of need  - Initiate/Maintain bed alarm  - Obtain necessary fall risk management equipment:   - Apply yellow socks and bracelet for high fall risk patients  - Consider moving patient to room near nurses station  Outcome: Progressing     Problem: Prexisting or High Potential for Compromised Skin Integrity  Goal: Skin integrity is maintained or improved  Description: INTERVENTIONS:  - Identify patients at risk for skin breakdown  - Assess and monitor skin integrity  - Assess and monitor nutrition and hydration status  - Monitor labs   - Assess for incontinence   - Turn and reposition patient  - Assist with mobility/ambulation  - Relieve pressure over bony prominences  - Avoid friction and shearing  - Provide appropriate hygiene as needed including keeping skin clean and dry  - Evaluate need for skin moisturizer/barrier cream  - Collaborate with interdisciplinary team   - Patient/family teaching  - Consider wound care consult   Outcome: Progressing     Problem: SKIN/TISSUE INTEGRITY - ADULT  Goal: Incision(s), wounds(s) or drain site(s) healing without S/S of infection  Description: INTERVENTIONS  - Assess and document dressing, incision, wound bed, drain sites and surrounding tissue  - Provide patient and family education  - Perform skin care/dressing changes every shift  Outcome: Progressing  Goal: Pressure injury heals and does not worsen  Description: Interventions:  - Implement low air loss mattress or specialty surface (Criteria met)  - Apply silicone foam dressing  - Instruct/assist with weight shifting every 30 minutes when in chair   - Limit chair time to 2 hour intervals  - Use special pressure reducing interventions such as waffle cushion when in chair   - Apply fecal or urinary incontinence containment device   - Perform passive or active ROM every shift  - Turn and reposition patient & offload bony prominences every 2 hours   - Utilize friction reducing device or surface for transfers   - Consider consults to  interdisciplinary teams such as   - Use incontinent care products after each incontinent episode    - Consider nutrition services referral as needed  Outcome: Progressing     Problem: PAIN - ADULT  Goal: Verbalizes/displays adequate comfort level or baseline comfort level  Description: Interventions:  - Encourage patient to monitor pain and request assistance  - Assess pain using appropriate pain scale  - Administer analgesics based on type and severity of pain and evaluate response  - Implement non-pharmacological measures as appropriate and evaluate response  - Consider cultural and social influences on pain and pain management  - Notify physician/advanced practitioner if interventions unsuccessful or patient reports new pain  Outcome: Progressing     Problem: INFECTION - ADULT  Goal: Absence or prevention of progression during hospitalization  Description: INTERVENTIONS:  - Assess and monitor for signs and symptoms of infection  - Monitor lab/diagnostic results  - Monitor all insertion sites, i e  indwelling lines, tubes, and drains  - Monitor endotracheal if appropriate and nasal secretions for changes in amount and color  - [place] appropriate cooling/warming therapies per order  - Administer medications as ordered  - Instruct and encourage patient and family to use good hand hygiene technique  - Identify and instruct in appropriate isolation precautions for identified infection/condition  Outcome: Progressing  Goal: Absence of fever/infection during neutropenic period  Description: INTERVENTIONS:  - Monitor WBC    Outcome: Progressing     Problem: SAFETY ADULT  Goal: Maintain or return to baseline ADL function  Description: INTERVENTIONS:  -  Assess patient's ability to carry out ADLs; assess patient's baseline for ADL function and identify physical deficits which impact ability to perform ADLs (bathing, care of mouth/teeth, toileting, grooming, dressing, etc )  - Assess/evaluate cause of self-care deficits   - Assess range of motion  - Assess patient's mobility; develop plan if impaired  - Assess patient's need for assistive devices and provide as appropriate  - Encourage maximum independence but intervene and supervise when necessary  - Involve family in performance of ADLs  - Assess for home care needs following discharge   - Consider OT consult to assist with ADL evaluation and planning for discharge  - Provide patient education as appropriate  Outcome: Progressing  Goal: Maintains/Returns to pre admission functional level  Description: INTERVENTIONS:  - Perform BMAT or MOVE assessment daily    - Set and communicate daily mobility goal to care team and patient/family/caregiver  - Collaborate with rehabilitation services on mobility goals if consulted  - Perform Range of Motion 4 times a day  - Reposition patient every 2 hours    - Dangle patient 2 times a day  - Stand patient 2 times a day  - Ambulate patient 2 times a day  - Out of bed to chair 3 times a day   - Out of bed for meals 3 times a day  - Out of bed for toileting  - Record patient progress and toleration of activity level   Outcome: Progressing  Goal: Patient will remain free of falls  Description: INTERVENTIONS:  - Educate patient/family on patient safety including physical limitations  - Instruct patient to call for assistance with activity   - Consult OT/PT to assist with strengthening/mobility   - Keep Call bell within reach  - Keep bed low and locked with side rails adjusted as appropriate  - Keep care items and personal belongings within reach  - Initiate and maintain comfort rounds  - Make Fall Risk Sign visible to staff  - Offer Toileting every 2 Hours, in advance of need  - Initiate/Maintain bed alarm  - Obtain necessary fall risk management equipment:   - Apply yellow socks and bracelet for high fall risk patients  - Consider moving patient to room near nurses station  Outcome: Progressing     Problem: DISCHARGE PLANNING  Goal: Discharge to home or other facility with appropriate resources  Description: INTERVENTIONS:  - Identify barriers to discharge w/patient and caregiver  - Arrange for needed discharge resources and transportation as appropriate  - Identify discharge learning needs (meds, wound care, etc )  - Refer to Case Management Department for coordinating discharge planning if the patient needs post-hospital services based on physician/advanced practitioner order or complex needs related to functional status, cognitive ability, or social support system  Outcome: Progressing     Problem: Nutrition/Hydration-ADULT  Goal: Nutrient/Hydration intake appropriate for improving, restoring or maintaining nutritional needs  Description: Monitor and assess patient's nutrition/hydration status for malnutrition  Collaborate with interdisciplinary team and initiate plan and interventions as ordered  Monitor patient's weight and dietary intake as ordered or per policy  Utilize nutrition screening tool and intervene as necessary  Determine patient's food preferences and provide high-protein, high-caloric foods as appropriate       INTERVENTIONS:  - Monitor oral intake, urinary output, labs, and treatment plans  - Assess nutrition and hydration status and recommend course of action  - Evaluate amount of meals eaten  - Assist patient with eating if necessary   - Allow adequate time for meals  - Recommend/ encourage appropriate diets, oral nutritional supplements, and vitamin/mineral supplements  - Order, calculate, and assess calorie counts as needed  - Recommend, monitor, and adjust tube feedings and TPN/PPN based on assessed needs  - Assess need for intravenous fluids  - Provide specific nutrition/hydration education as appropriate  - Include patient/family/caregiver in decisions related to nutrition  Outcome: Progressing

## 2021-09-14 NOTE — QUICK NOTE
Patient is 8 days out from initial incision and drainage with multiple subsequent bedside debridements after presenting with a large abscessed decubitus ulcer of left ischium/buttock with full thickness skin necrosis and MRSA bacteremia      Plan:  VAC applied yesterday and holding suction well today  Change M/W/F schedule  Wet to dry if it comes off early  Transition to home unit when available  Stable for d/c surgically  Outpatient follow-up in 2 weeks

## 2021-09-14 NOTE — WOUND OSTOMY CARE
Progress Note - Wound   Guilherme Oconnell 49 y o  male MRN: [id number]  Unit/Bed#: -02 Encounter: [id number]    History of Present Illness:Pt seen for follow weekly visit  H/O cerebral palsy, scoliosis,DM and ataxia  Pt lying supine in bed  Utilizing P500 low air loss therapy surface  Assessment& Findings:  1) Bilateral heels intact  Allevyn heel foams intact bilaterally  2) POA  2 wounds to right side back are clean dry and intact as pictured below  See flowsheet for details  Both are dry  Allevyn foam applied as a preventative measure  3) Pt has wound vac to left buttock managed by surgery s/p I&D  4) Anterior right knee,scab clean dry and intact  3M applied  Skin and Wound Care Plan:   1  Apply skin nourishing cream to the skin daily  2  Elevate heels off of bed with pillows to offload  3  P-500 low air loss therapy surface  4  Turn and reposition patient Q2 hours, avoid placing patient on left side, use green wedges to offload left buttocks  5  Allevyn life heel foams to bilateral heels, sally with P, date, and peel back daily for skin assessment, change every 3 days or with soilage or dislodgement  6 Cleanse wounds to right upper back and right lower back with NSS, cover with Allevyn life silicone bordered dressing, sally with T, date, change every 3 days or with soilage or dislodgement  7  Apply 3M No Sting barrier film to right knee scabbed areas            Wound 09/06/21 Back Lateral;Right;Upper (Active)   Wound Image   09/14/21 1502   Wound Description Clean;Dry; Intact; Beefy red;Fragile 09/14/21 1502   Dee-wound Assessment GISEL 09/13/21 0830   Wound Length (cm) 3 cm 09/14/21 1502   Wound Width (cm) 0 5 cm 09/14/21 1502   Wound Depth (cm) 0 cm 09/06/21 0924   Wound Surface Area (cm^2) 1 5 cm^2 09/14/21 1502   Wound Volume (cm^3) 0 cm^3 09/06/21 0924   Calculated Wound Volume (cm^3) 0 cm^3 09/06/21 0924   Drainage Amount GISEL 09/13/21 0830   Drainage Description GISEL 09/13/21 0830   Dressing Foam, Silicon (eg  Allevyn, etc) 09/14/21 1502   Dressing Changed New 09/14/21 1502   Patient Tolerance Tolerated well 09/14/21 1502   Dressing Status Clean;Dry; Intact 09/14/21 1502       Wound 09/06/21 Back Right; Lower (Active)   Wound Image   09/14/21 1503   Wound Description Clean;Dry; Intact;Fragile;Pink 09/14/21 1503   Dee-wound Assessment GISEL 09/13/21 0830   Wound Length (cm) 0 5 cm 09/14/21 1503   Wound Width (cm) 3 cm 09/14/21 1503   Wound Depth (cm) 0 cm 09/06/21 0924   Wound Surface Area (cm^2) 1 5 cm^2 09/14/21 1503   Wound Volume (cm^3) 0 cm^3 09/06/21 0924   Calculated Wound Volume (cm^3) 0 cm^3 09/06/21 0924   Drainage Amount None 09/14/21 1503   Dressing Foam, Silicon (eg  Allevyn, etc) 09/14/21 1503   Dressing Changed New 09/14/21 1503   Patient Tolerance Tolerated well 09/14/21 1503   Dressing Status Clean;Dry; Intact 09/14/21 1503       Wound 09/06/21 Traumatic Abrasion(s) Knee Anterior;Right (Active)   Wound Image   09/14/21 1500   Wound Description Dry;Beefy red;Brown 09/14/21 1045   Dee-wound Assessment Dry;Pink 09/14/21 1045   Wound Length (cm) 1 cm 09/06/21 0924   Wound Width (cm) 0 6 cm 09/06/21 0924   Wound Depth (cm) 0 cm 09/06/21 0924   Wound Surface Area (cm^2) 0 6 cm^2 09/06/21 0924   Wound Volume (cm^3) 0 cm^3 09/06/21 0924   Calculated Wound Volume (cm^3) 0 cm^3 09/06/21 0924   Drainage Amount None 09/06/21 0939   Dressing Protective barrier 09/14/21 1045   Patient Tolerance Tolerated well 09/11/21 1100   Dressing Status Clean;Dry; Intact 09/13/21 0830     Wound Care will sign off  Call or Tigertext with any questions    Lajune Canavan RN  [facility]

## 2021-09-14 NOTE — PROGRESS NOTES
Vancomycin Assessment    Vani Escobar is a 49 y o  male who is currently receiving vancomycin 1500 mg IV Q 8 Hrs for skin-soft tissue infection, MRSA confirmed     Relevant clinical data and objective history reviewed:  Creatinine   Date Value Ref Range Status   09/13/2021 0 49 (L) 0 70 - 1 30 mg/dL Final     Comment:     Standardized to IDMS reference method   09/12/2021 0 37 (L) 0 70 - 1 30 mg/dL Final     Comment:     Standardized to IDMS reference method   09/10/2021 0 44 (L) 0 70 - 1 30 mg/dL Final     Comment:     Standardized to IDMS reference method     /73 (BP Location: Left arm)   Pulse 82   Temp 97 7 °F (36 5 °C) (Tympanic)   Resp 18   Ht 6' (1 829 m)   Wt 74 4 kg (164 lb)   SpO2 94%   BMI 22 24 kg/m²   I/O last 3 completed shifts: In: 450 [P O :450]  Out: 200 [Urine:200]  Lab Results   Component Value Date/Time    BUN 19 09/13/2021 05:42 AM    WBC 9 90 09/13/2021 05:42 AM    HGB 11 5 (L) 09/13/2021 05:42 AM    HCT 33 8 (L) 09/13/2021 05:42 AM    MCV 96 09/13/2021 05:42 AM     (H) 09/13/2021 05:42 AM     Temp Readings from Last 3 Encounters:   09/13/21 97 7 °F (36 5 °C) (Tympanic)   05/18/21 97 9 °F (36 6 °C) (Tympanic)   05/18/21 (!) 96 9 °F (36 1 °C)     Vancomycin Days of Therapy: 10    Assessment/Plan  The patient is currently on vancomycin utilizing scheduled dosing  Baseline risks associated with therapy include: concomitant nephrotoxic medications  The patient is receiving 1500 mg IV Q 8 Hrs with the most recent vancomycin level being at steady-state and therapeutic (18 0) based on a goal of 15-20 (appropriate for most indications) ; therefore, is clinically appropriate and dose will be continued   Pharmacy will continue to follow closely for s/sx of nephrotoxicity, infusion reactions, and appropriateness of therapy  BMP and CBC will be ordered per protocol  Plan for next trough on 9/17/21   Pharmacy will continue to follow the patients culture results and clinical progress daily      Katelyn Aldrich, Pharmacist

## 2021-09-14 NOTE — PLAN OF CARE
Problem: MOBILITY - ADULT  Goal: Maintain or return to baseline ADL function  Description: INTERVENTIONS:  -  Assess patient's ability to carry out ADLs; assess patient's baseline for ADL function and identify physical deficits which impact ability to perform ADLs (bathing, care of mouth/teeth, toileting, grooming, dressing, etc )  - Assess/evaluate cause of self-care deficits   - Assess range of motion  - Assess patient's mobility; develop plan if impaired  - Assess patient's need for assistive devices and provide as appropriate  - Encourage maximum independence but intervene and supervise when necessary  - Involve family in performance of ADLs  - Assess for home care needs following discharge   - Consider OT consult to assist with ADL evaluation and planning for discharge  - Provide patient education as appropriate  Outcome: Progressing  Goal: Maintains/Returns to pre admission functional level  Description: INTERVENTIONS:  - Perform BMAT or MOVE assessment daily    - Set and communicate daily mobility goal to care team and patient/family/caregiver  - Collaborate with rehabilitation services on mobility goals if consulted  - Perform Range of Motion 4 times a day  - Reposition patient every 2 hours    - Dangle patient 2 times a day  - Stand patient 2 times a day  - Ambulate patient 2 times a day  - Out of bed to chair 3 times a day   - Out of bed for meals 3 times a day  - Out of bed for toileting  - Record patient progress and toleration of activity level   Outcome: Progressing     Problem: Potential for Falls  Goal: Patient will remain free of falls  Description: INTERVENTIONS:  - Educate patient/family on patient safety including physical limitations  - Instruct patient to call for assistance with activity   - Consult OT/PT to assist with strengthening/mobility   - Keep Call bell within reach  - Keep bed low and locked with side rails adjusted as appropriate  - Keep care items and personal belongings within reach  - Initiate and maintain comfort rounds  - Make Fall Risk Sign visible to staff  - Offer Toileting every 2 Hours, in advance of need  - Initiate/Maintain bed alarm  - Obtain necessary fall risk management equipment:   - Apply yellow socks and bracelet for high fall risk patients  - Consider moving patient to room near nurses station  Outcome: Progressing     Problem: Prexisting or High Potential for Compromised Skin Integrity  Goal: Skin integrity is maintained or improved  Description: INTERVENTIONS:  - Identify patients at risk for skin breakdown  - Assess and monitor skin integrity  - Assess and monitor nutrition and hydration status  - Monitor labs   - Assess for incontinence   - Turn and reposition patient  - Assist with mobility/ambulation  - Relieve pressure over bony prominences  - Avoid friction and shearing  - Provide appropriate hygiene as needed including keeping skin clean and dry  - Evaluate need for skin moisturizer/barrier cream  - Collaborate with interdisciplinary team   - Patient/family teaching  - Consider wound care consult   Outcome: Progressing     Problem: SKIN/TISSUE INTEGRITY - ADULT  Goal: Incision(s), wounds(s) or drain site(s) healing without S/S of infection  Description: INTERVENTIONS  - Assess and document dressing, incision, wound bed, drain sites and surrounding tissue  - Provide patient and family education  - Perform skin care/dressing changes every shift  Outcome: Progressing  Goal: Pressure injury heals and does not worsen  Description: Interventions:  - Implement low air loss mattress or specialty surface (Criteria met)  - Apply silicone foam dressing  - Instruct/assist with weight shifting every 30 minutes when in chair   - Limit chair time to 2 hour intervals  - Use special pressure reducing interventions such as waffle cushion when in chair   - Apply fecal or urinary incontinence containment device   - Perform passive or active ROM every shift  - Turn and reposition patient & offload bony prominences every 2 hours   - Utilize friction reducing device or surface for transfers   - Consider consults to  interdisciplinary teams such as   - Use incontinent care products after each incontinent episode    - Consider nutrition services referral as needed  Outcome: Progressing     Problem: PAIN - ADULT  Goal: Verbalizes/displays adequate comfort level or baseline comfort level  Description: Interventions:  - Encourage patient to monitor pain and request assistance  - Assess pain using appropriate pain scale  - Administer analgesics based on type and severity of pain and evaluate response  - Implement non-pharmacological measures as appropriate and evaluate response  - Consider cultural and social influences on pain and pain management  - Notify physician/advanced practitioner if interventions unsuccessful or patient reports new pain  Outcome: Progressing     Problem: INFECTION - ADULT  Goal: Absence or prevention of progression during hospitalization  Description: INTERVENTIONS:  - Assess and monitor for signs and symptoms of infection  - Monitor lab/diagnostic results  - Monitor all insertion sites, i e  indwelling lines, tubes, and drains  - Monitor endotracheal if appropriate and nasal secretions for changes in amount and color  - [place] appropriate cooling/warming therapies per order  - Administer medications as ordered  - Instruct and encourage patient and family to use good hand hygiene technique  - Identify and instruct in appropriate isolation precautions for identified infection/condition  Outcome: Progressing  Goal: Absence of fever/infection during neutropenic period  Description: INTERVENTIONS:  - Monitor WBC    Outcome: Progressing     Problem: SAFETY ADULT  Goal: Maintain or return to baseline ADL function  Description: INTERVENTIONS:  -  Assess patient's ability to carry out ADLs; assess patient's baseline for ADL function and identify physical deficits which impact ability to perform ADLs (bathing, care of mouth/teeth, toileting, grooming, dressing, etc )  - Assess/evaluate cause of self-care deficits   - Assess range of motion  - Assess patient's mobility; develop plan if impaired  - Assess patient's need for assistive devices and provide as appropriate  - Encourage maximum independence but intervene and supervise when necessary  - Involve family in performance of ADLs  - Assess for home care needs following discharge   - Consider OT consult to assist with ADL evaluation and planning for discharge  - Provide patient education as appropriate  Outcome: Progressing  Goal: Maintains/Returns to pre admission functional level  Description: INTERVENTIONS:  - Perform BMAT or MOVE assessment daily    - Set and communicate daily mobility goal to care team and patient/family/caregiver  - Collaborate with rehabilitation services on mobility goals if consulted  - Perform Range of Motion 4 times a day  - Reposition patient every 2 hours    - Dangle patient 2 times a day  - Stand patient 2 times a day  - Ambulate patient 2 times a day  - Out of bed to chair 3 times a day   - Out of bed for meals 3 times a day  - Out of bed for toileting  - Record patient progress and toleration of activity level   Outcome: Progressing  Goal: Patient will remain free of falls  Description: INTERVENTIONS:  - Educate patient/family on patient safety including physical limitations  - Instruct patient to call for assistance with activity   - Consult OT/PT to assist with strengthening/mobility   - Keep Call bell within reach  - Keep bed low and locked with side rails adjusted as appropriate  - Keep care items and personal belongings within reach  - Initiate and maintain comfort rounds  - Make Fall Risk Sign visible to staff  - Offer Toileting every 2 Hours, in advance of need  - Initiate/Maintain bed alarm  - Obtain necessary fall risk management equipment:   - Apply yellow socks and bracelet for high fall risk patients  - Consider moving patient to room near nurses station  Outcome: Progressing     Problem: DISCHARGE PLANNING  Goal: Discharge to home or other facility with appropriate resources  Description: INTERVENTIONS:  - Identify barriers to discharge w/patient and caregiver  - Arrange for needed discharge resources and transportation as appropriate  - Identify discharge learning needs (meds, wound care, etc )  - Refer to Case Management Department for coordinating discharge planning if the patient needs post-hospital services based on physician/advanced practitioner order or complex needs related to functional status, cognitive ability, or social support system  Outcome: Progressing     Problem: Nutrition/Hydration-ADULT  Goal: Nutrient/Hydration intake appropriate for improving, restoring or maintaining nutritional needs  Description: Monitor and assess patient's nutrition/hydration status for malnutrition  Collaborate with interdisciplinary team and initiate plan and interventions as ordered  Monitor patient's weight and dietary intake as ordered or per policy  Utilize nutrition screening tool and intervene as necessary  Determine patient's food preferences and provide high-protein, high-caloric foods as appropriate       INTERVENTIONS:  - Monitor oral intake, urinary output, labs, and treatment plans  - Assess nutrition and hydration status and recommend course of action  - Evaluate amount of meals eaten  - Assist patient with eating if necessary   - Allow adequate time for meals  - Recommend/ encourage appropriate diets, oral nutritional supplements, and vitamin/mineral supplements  - Order, calculate, and assess calorie counts as needed  - Recommend, monitor, and adjust tube feedings and TPN/PPN based on assessed needs  - Assess need for intravenous fluids  - Provide specific nutrition/hydration education as appropriate  - Include patient/family/caregiver in decisions related to nutrition  Outcome: Progressing

## 2021-09-15 ENCOUNTER — HOSPITAL ENCOUNTER (INPATIENT)
Facility: HOSPITAL | Age: 56
LOS: 6 days | Discharge: HOME HEALTH | DRG: 314 | End: 2021-09-22
Attending: INTERNAL MEDICINE | Admitting: INTERNAL MEDICINE
Payer: MEDICARE

## 2021-09-15 VITALS
RESPIRATION RATE: 18 BRPM | HEART RATE: 77 BPM | DIASTOLIC BLOOD PRESSURE: 65 MMHG | TEMPERATURE: 96 F | WEIGHT: 164 LBS | HEIGHT: 72 IN | BODY MASS INDEX: 22.21 KG/M2 | SYSTOLIC BLOOD PRESSURE: 111 MMHG | OXYGEN SATURATION: 96 %

## 2021-09-15 DIAGNOSIS — F69 BEHAVIOR CONCERN IN ADULT: ICD-10-CM

## 2021-09-15 DIAGNOSIS — L02.91 ABSCESS: ICD-10-CM

## 2021-09-15 DIAGNOSIS — R78.81 BACTEREMIA DUE TO GRAM-POSITIVE BACTERIA: ICD-10-CM

## 2021-09-15 DIAGNOSIS — L02.31 GLUTEAL ABSCESS: ICD-10-CM

## 2021-09-15 DIAGNOSIS — Z95.828 S/P PICC CENTRAL LINE PLACEMENT: ICD-10-CM

## 2021-09-15 DIAGNOSIS — L89.304: Primary | ICD-10-CM

## 2021-09-15 LAB
ANION GAP SERPL CALCULATED.3IONS-SCNC: 6 MMOL/L (ref 4–13)
ANION GAP SERPL CALCULATED.3IONS-SCNC: 7 MMOL/L (ref 4–13)
BASOPHILS # BLD AUTO: 0.1 THOUSANDS/ΜL (ref 0–0.1)
BASOPHILS # BLD AUTO: 0.1 THOUSANDS/ΜL (ref 0–0.1)
BASOPHILS NFR BLD AUTO: 1 % (ref 0–2)
BASOPHILS NFR BLD AUTO: 1 % (ref 0–2)
BUN SERPL-MCNC: 17 MG/DL (ref 7–25)
BUN SERPL-MCNC: 20 MG/DL (ref 7–25)
CALCIUM SERPL-MCNC: 7.5 MG/DL (ref 8.6–10.5)
CALCIUM SERPL-MCNC: 9.6 MG/DL (ref 8.6–10.5)
CHLORIDE SERPL-SCNC: 100 MMOL/L (ref 98–107)
CHLORIDE SERPL-SCNC: 99 MMOL/L (ref 98–107)
CO2 SERPL-SCNC: 28 MMOL/L (ref 21–31)
CO2 SERPL-SCNC: 29 MMOL/L (ref 21–31)
CREAT SERPL-MCNC: 0.48 MG/DL (ref 0.7–1.3)
CREAT SERPL-MCNC: 0.61 MG/DL (ref 0.7–1.3)
EOSINOPHIL # BLD AUTO: 0.1 THOUSAND/ΜL (ref 0–0.61)
EOSINOPHIL # BLD AUTO: 0.2 THOUSAND/ΜL (ref 0–0.61)
EOSINOPHIL NFR BLD AUTO: 1 % (ref 0–5)
EOSINOPHIL NFR BLD AUTO: 2 % (ref 0–5)
ERYTHROCYTE [DISTWIDTH] IN BLOOD BY AUTOMATED COUNT: 13.8 % (ref 11.5–14.5)
ERYTHROCYTE [DISTWIDTH] IN BLOOD BY AUTOMATED COUNT: 13.8 % (ref 11.5–14.5)
GFR SERPL CREATININE-BSD FRML MDRD: 113 ML/MIN/1.73SQ M
GFR SERPL CREATININE-BSD FRML MDRD: 125 ML/MIN/1.73SQ M
GLUCOSE SERPL-MCNC: 108 MG/DL (ref 65–140)
GLUCOSE SERPL-MCNC: 108 MG/DL (ref 65–99)
GLUCOSE SERPL-MCNC: 110 MG/DL (ref 65–99)
GLUCOSE SERPL-MCNC: 98 MG/DL (ref 65–140)
HCT VFR BLD AUTO: 32 % (ref 42–47)
HCT VFR BLD AUTO: 34.5 % (ref 42–47)
HGB BLD-MCNC: 10.9 G/DL (ref 14–18)
HGB BLD-MCNC: 11.5 G/DL (ref 14–18)
LYMPHOCYTES # BLD AUTO: 2.3 THOUSANDS/ΜL (ref 0.6–4.47)
LYMPHOCYTES # BLD AUTO: 3.2 THOUSANDS/ΜL (ref 0.6–4.47)
LYMPHOCYTES NFR BLD AUTO: 26 % (ref 21–51)
LYMPHOCYTES NFR BLD AUTO: 37 % (ref 21–51)
MAGNESIUM SERPL-MCNC: 1.6 MG/DL (ref 1.9–2.7)
MCH RBC QN AUTO: 32.1 PG (ref 26–34)
MCH RBC QN AUTO: 32.5 PG (ref 26–34)
MCHC RBC AUTO-ENTMCNC: 33.4 G/DL (ref 31–37)
MCHC RBC AUTO-ENTMCNC: 34.1 G/DL (ref 31–37)
MCV RBC AUTO: 95 FL (ref 81–99)
MCV RBC AUTO: 96 FL (ref 81–99)
MONOCYTES # BLD AUTO: 1.2 THOUSAND/ΜL (ref 0.17–1.22)
MONOCYTES # BLD AUTO: 1.3 THOUSAND/ΜL (ref 0.17–1.22)
MONOCYTES NFR BLD AUTO: 13 % (ref 2–12)
MONOCYTES NFR BLD AUTO: 15 % (ref 2–12)
NEUTROPHILS # BLD AUTO: 4.2 THOUSANDS/ΜL (ref 1.4–6.5)
NEUTROPHILS # BLD AUTO: 5 THOUSANDS/ΜL (ref 1.4–6.5)
NEUTS SEG NFR BLD AUTO: 47 % (ref 42–75)
NEUTS SEG NFR BLD AUTO: 57 % (ref 42–75)
PLATELET # BLD AUTO: 403 THOUSANDS/UL (ref 149–390)
PLATELET # BLD AUTO: 404 THOUSANDS/UL (ref 149–390)
PMV BLD AUTO: 7.8 FL (ref 8.6–11.7)
PMV BLD AUTO: 8 FL (ref 8.6–11.7)
POTASSIUM SERPL-SCNC: 4.8 MMOL/L (ref 3.5–5.5)
POTASSIUM SERPL-SCNC: 5.3 MMOL/L (ref 3.5–5.5)
RBC # BLD AUTO: 3.35 MILLION/UL (ref 4.3–5.9)
RBC # BLD AUTO: 3.59 MILLION/UL (ref 4.3–5.9)
SODIUM SERPL-SCNC: 134 MMOL/L (ref 134–143)
SODIUM SERPL-SCNC: 135 MMOL/L (ref 134–143)
WBC # BLD AUTO: 8.8 THOUSAND/UL (ref 4.8–10.8)
WBC # BLD AUTO: 8.9 THOUSAND/UL (ref 4.8–10.8)

## 2021-09-15 PROCEDURE — 80048 BASIC METABOLIC PNL TOTAL CA: CPT | Performed by: HOSPITALIST

## 2021-09-15 PROCEDURE — 85025 COMPLETE CBC W/AUTO DIFF WBC: CPT | Performed by: HOSPITALIST

## 2021-09-15 PROCEDURE — 99285 EMERGENCY DEPT VISIT HI MDM: CPT | Performed by: INTERNAL MEDICINE

## 2021-09-15 PROCEDURE — 85025 COMPLETE CBC W/AUTO DIFF WBC: CPT | Performed by: INTERNAL MEDICINE

## 2021-09-15 PROCEDURE — 36415 COLL VENOUS BLD VENIPUNCTURE: CPT | Performed by: INTERNAL MEDICINE

## 2021-09-15 PROCEDURE — 99239 HOSP IP/OBS DSCHRG MGMT >30: CPT | Performed by: HOSPITALIST

## 2021-09-15 PROCEDURE — 80048 BASIC METABOLIC PNL TOTAL CA: CPT | Performed by: INTERNAL MEDICINE

## 2021-09-15 PROCEDURE — 82948 REAGENT STRIP/BLOOD GLUCOSE: CPT

## 2021-09-15 PROCEDURE — 83735 ASSAY OF MAGNESIUM: CPT | Performed by: HOSPITALIST

## 2021-09-15 PROCEDURE — 99234 HOSP IP/OBS SM DT SF/LOW 45: CPT | Performed by: INTERNAL MEDICINE

## 2021-09-15 PROCEDURE — 96365 THER/PROPH/DIAG IV INF INIT: CPT

## 2021-09-15 PROCEDURE — 99284 EMERGENCY DEPT VISIT MOD MDM: CPT

## 2021-09-15 RX ORDER — SODIUM CHLORIDE, SODIUM GLUCONATE, SODIUM ACETATE, POTASSIUM CHLORIDE, MAGNESIUM CHLORIDE, SODIUM PHOSPHATE, DIBASIC, AND POTASSIUM PHOSPHATE .53; .5; .37; .037; .03; .012; .00082 G/100ML; G/100ML; G/100ML; G/100ML; G/100ML; G/100ML; G/100ML
75 INJECTION, SOLUTION INTRAVENOUS ONCE
Status: COMPLETED | OUTPATIENT
Start: 2021-09-15 | End: 2021-09-16

## 2021-09-15 RX ORDER — HYDROXYZINE HYDROCHLORIDE 25 MG/1
50 TABLET, FILM COATED ORAL 3 TIMES DAILY
Status: DISCONTINUED | OUTPATIENT
Start: 2021-09-15 | End: 2021-09-22 | Stop reason: HOSPADM

## 2021-09-15 RX ORDER — BENZONATATE 100 MG/1
200 CAPSULE ORAL 3 TIMES DAILY PRN
Status: DISCONTINUED | OUTPATIENT
Start: 2021-09-15 | End: 2021-09-22 | Stop reason: HOSPADM

## 2021-09-15 RX ORDER — DOCUSATE SODIUM 100 MG/1
100 CAPSULE, LIQUID FILLED ORAL 2 TIMES DAILY
Status: DISCONTINUED | OUTPATIENT
Start: 2021-09-15 | End: 2021-09-22 | Stop reason: HOSPADM

## 2021-09-15 RX ORDER — POLYETHYLENE GLYCOL 3350 17 G/17G
17 POWDER, FOR SOLUTION ORAL DAILY
Status: DISCONTINUED | OUTPATIENT
Start: 2021-09-16 | End: 2021-09-22 | Stop reason: HOSPADM

## 2021-09-15 RX ORDER — GABAPENTIN 100 MG/1
100 CAPSULE ORAL 3 TIMES DAILY
Status: DISCONTINUED | OUTPATIENT
Start: 2021-09-15 | End: 2021-09-22 | Stop reason: HOSPADM

## 2021-09-15 RX ORDER — BENZTROPINE MESYLATE 0.5 MG/1
0.5 TABLET ORAL 2 TIMES DAILY
Status: DISCONTINUED | OUTPATIENT
Start: 2021-09-15 | End: 2021-09-22 | Stop reason: HOSPADM

## 2021-09-15 RX ORDER — DIVALPROEX SODIUM 500 MG/1
500 TABLET, DELAYED RELEASE ORAL DAILY
Status: DISCONTINUED | OUTPATIENT
Start: 2021-09-16 | End: 2021-09-22 | Stop reason: HOSPADM

## 2021-09-15 RX ORDER — MELATONIN
1000 DAILY
Status: DISCONTINUED | OUTPATIENT
Start: 2021-09-16 | End: 2021-09-22 | Stop reason: HOSPADM

## 2021-09-15 RX ORDER — DIVALPROEX SODIUM 500 MG/1
1000 TABLET, EXTENDED RELEASE ORAL
Status: DISCONTINUED | OUTPATIENT
Start: 2021-09-15 | End: 2021-09-22 | Stop reason: HOSPADM

## 2021-09-15 RX ORDER — TEMAZEPAM 15 MG/1
15 CAPSULE ORAL
Status: DISCONTINUED | OUTPATIENT
Start: 2021-09-15 | End: 2021-09-22 | Stop reason: HOSPADM

## 2021-09-15 RX ORDER — LEVETIRACETAM 500 MG/1
1500 TABLET ORAL 2 TIMES DAILY
Status: DISCONTINUED | OUTPATIENT
Start: 2021-09-15 | End: 2021-09-22 | Stop reason: HOSPADM

## 2021-09-15 RX ORDER — CLONAZEPAM 0.5 MG/1
0.25 TABLET ORAL DAILY PRN
Status: DISCONTINUED | OUTPATIENT
Start: 2021-09-15 | End: 2021-09-22 | Stop reason: HOSPADM

## 2021-09-15 RX ORDER — CALCIUM CARBONATE 500(1250)
1 TABLET ORAL
Status: DISCONTINUED | OUTPATIENT
Start: 2021-09-16 | End: 2021-09-22 | Stop reason: HOSPADM

## 2021-09-15 RX ORDER — PRAVASTATIN SODIUM 40 MG
80 TABLET ORAL
Status: DISCONTINUED | OUTPATIENT
Start: 2021-09-16 | End: 2021-09-22 | Stop reason: HOSPADM

## 2021-09-15 RX ORDER — ONDANSETRON 2 MG/ML
4 INJECTION INTRAMUSCULAR; INTRAVENOUS EVERY 6 HOURS PRN
Status: DISCONTINUED | OUTPATIENT
Start: 2021-09-15 | End: 2021-09-22 | Stop reason: HOSPADM

## 2021-09-15 RX ORDER — ACETAMINOPHEN 325 MG/1
650 TABLET ORAL EVERY 6 HOURS PRN
Status: DISCONTINUED | OUTPATIENT
Start: 2021-09-15 | End: 2021-09-22 | Stop reason: HOSPADM

## 2021-09-15 RX ORDER — LORATADINE 10 MG/1
10 TABLET ORAL DAILY
Status: DISCONTINUED | OUTPATIENT
Start: 2021-09-16 | End: 2021-09-22 | Stop reason: HOSPADM

## 2021-09-15 RX ORDER — LISINOPRIL 10 MG/1
10 TABLET ORAL DAILY
Status: DISCONTINUED | OUTPATIENT
Start: 2021-09-16 | End: 2021-09-22 | Stop reason: HOSPADM

## 2021-09-15 RX ADMIN — CALCIUM 1 TABLET: 500 TABLET ORAL at 09:56

## 2021-09-15 RX ADMIN — LORATADINE 10 MG: 10 TABLET ORAL at 09:56

## 2021-09-15 RX ADMIN — DIVALPROEX SODIUM 500 MG: 500 TABLET, EXTENDED RELEASE ORAL at 09:55

## 2021-09-15 RX ADMIN — GABAPENTIN 100 MG: 100 CAPSULE ORAL at 09:55

## 2021-09-15 RX ADMIN — GABAPENTIN 100 MG: 100 CAPSULE ORAL at 21:37

## 2021-09-15 RX ADMIN — Medication 1000 UNITS: at 09:55

## 2021-09-15 RX ADMIN — HYDROXYZINE HYDROCHLORIDE 50 MG: 25 TABLET ORAL at 21:37

## 2021-09-15 RX ADMIN — LEVETIRACETAM 1500 MG: 500 TABLET ORAL at 22:28

## 2021-09-15 RX ADMIN — HYDROXYZINE HYDROCHLORIDE 50 MG: 25 TABLET ORAL at 09:56

## 2021-09-15 RX ADMIN — LEVOTHYROXINE SODIUM 125 MCG: 25 TABLET ORAL at 05:31

## 2021-09-15 RX ADMIN — DIVALPROEX SODIUM 1000 MG: 500 TABLET, EXTENDED RELEASE ORAL at 21:37

## 2021-09-15 RX ADMIN — QUETIAPINE FUMARATE 200 MG: 50 TABLET, EXTENDED RELEASE ORAL at 09:55

## 2021-09-15 RX ADMIN — TEMAZEPAM 15 MG: 15 CAPSULE ORAL at 21:37

## 2021-09-15 RX ADMIN — LACOSAMIDE 200 MG: 150 TABLET, FILM COATED ORAL at 09:56

## 2021-09-15 RX ADMIN — CYANOCOBALAMIN TAB 500 MCG 1000 MCG: 500 TAB at 09:56

## 2021-09-15 RX ADMIN — LISINOPRIL 10 MG: 10 TABLET ORAL at 09:59

## 2021-09-15 RX ADMIN — VANCOMYCIN HYDROCHLORIDE 1500 MG: 1 INJECTION, POWDER, LYOPHILIZED, FOR SOLUTION INTRAVENOUS at 00:39

## 2021-09-15 RX ADMIN — VANCOMYCIN HYDROCHLORIDE 1500 MG: 1 INJECTION, POWDER, LYOPHILIZED, FOR SOLUTION INTRAVENOUS at 09:55

## 2021-09-15 RX ADMIN — SODIUM CHLORIDE, SODIUM GLUCONATE, SODIUM ACETATE, POTASSIUM CHLORIDE, MAGNESIUM CHLORIDE, SODIUM PHOSPHATE, DIBASIC, AND POTASSIUM PHOSPHATE 75 ML/HR: .53; .5; .37; .037; .03; .012; .00082 INJECTION, SOLUTION INTRAVENOUS at 22:33

## 2021-09-15 RX ADMIN — BENZTROPINE MESYLATE 0.5 MG: 0.5 TABLET ORAL at 09:55

## 2021-09-15 RX ADMIN — QUETIAPINE FUMARATE 400 MG: 50 TABLET, EXTENDED RELEASE ORAL at 22:28

## 2021-09-15 RX ADMIN — HEPARIN SODIUM 5000 UNITS: 5000 INJECTION INTRAVENOUS; SUBCUTANEOUS at 05:31

## 2021-09-15 RX ADMIN — VANCOMYCIN HYDROCHLORIDE 1500 MG: 1 INJECTION, POWDER, LYOPHILIZED, FOR SOLUTION INTRAVENOUS at 19:48

## 2021-09-15 RX ADMIN — POLYETHYLENE GLYCOL 3350 17 G: 17 POWDER, FOR SOLUTION ORAL at 09:55

## 2021-09-15 RX ADMIN — BENZTROPINE MESYLATE 0.5 MG: 0.5 TABLET ORAL at 21:37

## 2021-09-15 RX ADMIN — LACOSAMIDE 200 MG: 150 TABLET, FILM COATED ORAL at 22:55

## 2021-09-15 RX ADMIN — LEVETIRACETAM 1500 MG: 500 TABLET, FILM COATED ORAL at 09:55

## 2021-09-15 NOTE — CASE MANAGEMENT
Case Management Discharge Planning Note    Patient name Joann Claudio  Location [address] [id number] MRN [id number]  : 1965 Date 9/15/2021       Current Admission Date: 2021  Current Admission Diagnosis:  Sepsis ([address] )  Previous Admission - Discharge Date:21   LOS (days): 10  Geometric Mean LOS (GMLOS) (days): 9 60  Days to GMLOS:0 1 Previous Discharge Diagnosis:  There are no discharge diagnoses documented for the most recent discharge  OBJECTIVE:  Risk of Unplanned Readmission Score: 21   Bundle(if applicable):    Current admission status: Inpatient  Preferred Pharmacy:   [address] [facility]  [address] [address]  Phone: [phone] Fax: [phone]    Primary Care Provider: Gabriela Reid PA-C    Primary Insurance: MEDICARE  Secondary Insurance[place] [employer] [place]    DISCHARGE DETAILS:    Discharge planning discussed with[de-identified] Pt foster mother Abraham do foster sister Burt Thomas of Choice: Yes    Contacts  Patient Contacts: Edie Bui to Patient[de-identified] Other (Comment) (Life share)  Contact Method: Phone  Phone Number: [phone]  Reason/Outcome: Discharge [address]         Is the patient interested in [facility] at discharge?: Yes  Via Karley Otoole requested[de-identified] [address] Name[de-identified] [facility] Provider[de-identified] PCP  Home Health Services Needed[de-identified] Wound/Ostomy Care  Homebound Criteria Met[de-identified] Psychological Issues, Requires the Assistance of Another Person for Safe Ambulation or to Leave the Home  Supporting Clincal Findings[de-identified] Cognitive Deficit Requiring the Assistance of Others, Limited Endurance    DME Referral Provided  Referral made for DME?: Yes ([facility] for wound vac, faxed staging of ulcer, should be able to deliver today)    Other Referral/Resources/Interventions Provided:  Interventions:  Other (Specify)    We would like to be able to fill any required prescriptions on discharge at our [address] [place] [address] and have them delivered to you at discharge in your room  Would you like to participate in this program? : Yes (IV ABX ordered through [facility])    Discharge Destination Plan[de-identified] Home  Transportation at Discharge?: Yes Kun Bull mother Janette Copeland transport)     IMM Given (Date):: 09/14/21  IMM Given to[de-identified] Designee Kun Bull mother)  Family notified[de-identified] Jhon Mom mother Petar Hein     Sent IVABX order to [facility]  Spoke to [facility], IVABX to be delivered by noon  Spoke to [address] [place] [address]  who is aware the wound vac and IV will be here  Can be discharged after 12 noon  [facility] will transport  [facility] anticipates a 5pm visit to teach the IV ABX  DOUGLAS Lomas aware of DC and will cover the wound with a wet to dry dsg

## 2021-09-15 NOTE — CONSULTS
Vancomycin IV Pharmacy-To-Dose Consultation:    Velia Rabago is a 49 y o  male who is currently receiving Vancomycin IV with management by the Pharmacy Consult service for the treatment of skin-soft tissue infection  Assessment/Plan:    The patient's chart was reviewed  Renal function is stable  There are no signs or symptoms of nephrotoxicity and/or infusion reactions documented  Based on today's assessment, will continue current vancomycin (Day # 11) dosing of 1500 mg IV Q 8 Hrs, with a plan for a trough to be drawn at 0900 on 9/17/21  We will continue to follow the patient's culture results and clinical progress daily      Daisy Mcgee, Pharmacist

## 2021-09-15 NOTE — ASSESSMENT & PLAN NOTE
· Secondary to left gluteal abscess, febrile and tachycardic on admission  · All sepsis parameters have since resolved  · 1/2 blood cultures positive for MRSA  · Status post an ID evaluation  · Status post a completed course of p o   Cefdinir on 09/12/2021  · Status post a PICC line placement 09/13/2020  · Patient is now medically stable for discharge  · DC home today  · Vancomycin home infusion was set up by case management  · Patient will need to be on vancomycin through September 22, 2021  · Patient to get a weekly CBC, CMP, and vanc trough testing  · Outpatient follow-up with PCP, General surgery and Infectious Disease Services

## 2021-09-15 NOTE — ASSESSMENT & PLAN NOTE
· Patient had a stage IV large abscessed decubitus ulcer of left ischium/buttock with full thickness skin necrosis that was present on admissionia    · Patient is 9 days out from initial incision and drainage with multiple subsequent bedside debridements by General surgery  · Wound VAC is now in place  · No further inpatient surgical workup is needed  · Cleared by surgery for discharge  · Outpatient follow-up with surgery  · Wound VAC is in place

## 2021-09-15 NOTE — DISCHARGE SUMMARY
[facility]  Discharge- Monico Turpin 1965, 49 y o  male MRN: [id number]  Unit/Bed#: -02 Encounter: [id number]  Primary Care Provider: Stephane Enrique PA-C   Date and time admitted to hospital: 9/5/2021  5:40 PM    * Sepsis [facility])  Assessment & Plan  · Secondary to left gluteal abscess, febrile and tachycardic on admission  · All sepsis parameters have since resolved  · 1/2 blood cultures positive for MRSA  · Status post an ID evaluation  · Status post a completed course of p o  Cefdinir on 09/12/2021  · Status post a PICC line placement 09/13/2020  · Patient is now medically stable for discharge  · DC home today  · Vancomycin home infusion was set up by case management  · Patient will need to be on vancomycin through September 22, 2021  · Patient to get a weekly CBC, CMP, and vanc trough testing  · Outpatient follow-up with PCP, General surgery and Infectious Disease Services    Bacteremia due to Gram-positive bacteria  Assessment & Plan  · Blood cultures 1 of 2 positive for MRSA  · Discharge home on the management as outlined above    Gluteal abscess  Assessment & Plan  · Patient had a stage IV large abscessed decubitus ulcer of left ischium/buttock with full thickness skin necrosis that was present on admissionia  · Patient is 9 days out from initial incision and drainage with multiple subsequent bedside debridements by General surgery  · Wound VAC is now in place  · No further inpatient surgical workup is needed  · Cleared by surgery for discharge  · Outpatient follow-up with surgery  · Wound VAC is in place    Essential hypertension  Assessment & Plan  · Blood pressure stable  · DC home on pre-admit meds at pre-admit dosages    Generalized convulsive epilepsy ([facility] [address] )  Assessment & Plan  · Continue pre-hospital Keppra 1500 mg p o  B i d , Depakote  mg p o   Daily and 1000 mg p o  Q h s , Klonopin 0 25 mg p o  B i d  and Vimpat 200 mg p o  B i d     Acquired hypothyroidism  Assessment & Plan  · Continue pre-hospital levothyroxine 125 mcg p o  Daily    Hyperlipidemia  Assessment & Plan  · Continue pravastatin post discharge    Metabolic encephalopathy  Assessment & Plan  · Likely secondary to sepsis/bacteremia  · Mental status appears to be back at baseline      Hyponatremia  Assessment & Plan  · Hypovolemic hyponatremia which resolved with IV fluids    Hypomagnesemia  Assessment & Plan  · Improved with repletion    Type 2 diabetes mellitus without complication, without long-term current use of insulin (HCC)  Assessment & Plan  · DC home on pre-admit meds at pre-admit dosages    Vitamin D deficiency  Assessment & Plan  · Continue pre-hospital vitamin D3 1000 units p o  Daily        Discharging Physician / Practitioner: Tova Sanchez MD  PCP: Coni Ledbetter PA-C  Admission Date:   Admission Orders (From admission, onward)     Ordered        09/05/21 2310  Inpatient Admission  Once                   Discharge Date: 09/15/21    Medical Problems     Resolved Problems  Date Reviewed: 9/5/2021    None                Consultations During Hospital Stay:  · General surgery  · Interventional Radiology  · Infectious Disease    Procedures Performed:   · 09/06/2021-left gluteal wound incision and drainage bedside by General surgery  · 09/08/2021-wound debridement bedside by General surgery  · 09/09/2021-wound debridement bedside by General surgery  · 09/13/2021-wound debridement bedside by General surgery  · 09/13/2021-PICC line placement by Interventional Radiology    Significant Findings / Test Results:   · X-ray pelvis-no acute osseous abnormality  · Chest x-ray-no acute cardiopulmonary disease  · CT scan pelvis:-Phlegmonous change/early abscess overlying the left gluteus bree muscle measuring 3 4 x 8 4 cm in cross-section and 9 7 cm in     Incidental Findings:   · None     Test Results Pending at Discharge (will require follow up):    · None     Outpatient Tests Requested:  · None    Complications:     None    Reason for Admission:  Sepsis    Hospital Course:     Juan Rowley is a 49 y o  male patient who originally presented to the hospital on 9/5/2021 due to lethargy  Please refer to the initial history and physical examination completed by Gracie LUCERO for the initial presenting features and complaints  In brief, the patient is a 57-year-old male, who came into the hospital because of weakness, lethargy, and falls prior to arrival   In the emergency room he was diagnosed with sepsis secondary to a left gluteal ulcer that was present on admission  Patient was started on broad-spectrum IV antibiotics  He was admitted to [facility]  He had imaging studies performed as outlined above  A general surgical consultation was obtained  The patient had bedside procedures as outlined above by General surgery  Patient was treated with IV antibiotics, and he had blood cultures that were positive for MRSA  He completed a course of cefepime by 09/12/2021, the patient need longer course of vancomycin  He was seen in conjunction with Infectious Disease Services  He successfully had a PICC line placed on 09/13/2021  Case management set up home vancomycin infusion therapy  Patient will complete vancomycin on September 22, 2021 after which he can have his PICC line removed  Patient will need weekly blood testing in the form of CBCs, CMP, and vanco trough testing while on antibiotics  He was otherwise discharged home on all of his other pre-admission medications at the preadmission dosages, and will follow up in the outpatient setting with General surgery, his primary care provider, and Infectious Disease Services  Please refer to the assessment/plan portion of this discharge summary as outlined above for the remainder of the details  Please see above list of diagnoses and related plan for additional information       Condition at Discharge: fair     Discharge Day Visit / Exam:     Subjective:  Patient seen and examined, no complaints no distress  Vitals: Blood Pressure: 111/65 (09/15/21 0959)  Pulse: 77 (09/15/21 0700)  Temperature: (!) 96 °F (35 6 °C) (09/15/21 0700)  Temp Source: Temporal (09/15/21 0700)  Respirations: 18 (09/15/21 0700)  Height: 6' (182 9 cm) (09/06/21 1504)  Weight - Scale: 74 4 kg (164 lb) (09/06/21 1503)  SpO2: 96 % (09/15/21 0700)  Exam:   Physical Exam  Vitals and nursing note reviewed  Constitutional:       General: He is not in acute distress  Appearance: Normal appearance  He is not ill-appearing  HENT:      Head: Normocephalic and atraumatic  Nose: Nose normal    Eyes:      Extraocular Movements: Extraocular movements intact  Pupils: Pupils are equal, round, and reactive to light  Cardiovascular:      Rate and Rhythm: Normal rate and regular rhythm  Pulses: Normal pulses  Heart sounds: Normal heart sounds  No murmur heard  No friction rub  No gallop  Pulmonary:      Effort: Pulmonary effort is normal       Breath sounds: Normal breath sounds  Abdominal:      General: There is no distension  Palpations: Abdomen is soft  There is no mass  Tenderness: There is no abdominal tenderness  There is no guarding or rebound  Musculoskeletal:         General: No swelling or tenderness  Normal range of motion  Cervical back: Normal range of motion and neck supple  No rigidity  No muscular tenderness  Right lower leg: No edema  Left lower leg: No edema  Skin:     General: Skin is warm  Capillary Refill: Capillary refill takes less than 2 seconds  Findings: No erythema or rash  Comments: Left gluteal wound VAC is in place   Neurological:      General: No focal deficit present  Mental Status: He is alert and oriented to person, place, and time  Mental status is at baseline     Psychiatric:         Mood and Affect: Mood normal          Behavior: Behavior normal  Discussion with Family:  Caregiver was brought up to par    Discharge instructions/Information to patient and family:   See after visit summary for information provided to patient and family  Provisions for Follow-Up Care:  See after visit summary for information related to follow-up care and any pertinent home health orders  Disposition:     Home with VNA Services (Reminder: Complete face to face encounter)      Planned Readmission:    None     Discharge Statement:  I spent 40 minutes discharging the patient  This time was spent on the day of discharge  I had direct contact with the patient on the day of discharge  Greater than 50% of the total time was spent examining patient, answering all patient questions, arranging and discussing plan of care with patient as well as directly providing post-discharge instructions  Additional time then spent on discharge activities  Discharge Medications:  See after visit summary for reconciled discharge medications provided to patient and family        ** Please Note: This note has been constructed using a voice recognition system **

## 2021-09-15 NOTE — PLAN OF CARE
Care plan reviewed and followed  Problem: Prexisting or High Potential for Compromised Skin Integrity  Goal: Skin integrity is maintained or improved  Description: INTERVENTIONS:  - Identify patients at risk for skin breakdown  - Assess and monitor skin integrity  - Assess and monitor nutrition and hydration status  - Monitor labs   - Assess for incontinence   - Turn and reposition patient  - Assist with mobility/ambulation  - Relieve pressure over bony prominences  - Avoid friction and shearing  - Provide appropriate hygiene as needed including keeping skin clean and dry  - Evaluate need for skin moisturizer/barrier cream  - Collaborate with interdisciplinary team   - Patient/family teaching  - Consider wound care consult   Outcome: Progressing     Problem: SKIN/TISSUE INTEGRITY - ADULT  Goal: Incision(s), wounds(s) or drain site(s) healing without S/S of infection  Description: INTERVENTIONS  - Assess and document dressing, incision, wound bed, drain sites and surrounding tissue  - Provide patient and family education  - Perform skin care/dressing changes every shift  Outcome: Progressing  Goal: Pressure injury heals and does not worsen  Description: Interventions:  - Implement low air loss mattress or specialty surface (Criteria met)  - Apply silicone foam dressing  - Instruct/assist with weight shifting every 30 minutes when in chair   - Limit chair time to 2 hour intervals  - Use special pressure reducing interventions such as waffle cushion when in chair   - Apply fecal or urinary incontinence containment device   - Perform passive or active ROM every shift  - Turn and reposition patient & offload bony prominences every 2 hours   - Utilize friction reducing device or surface for transfers   - Consider consults to  interdisciplinary teams such as   - Use incontinent care products after each incontinent episode    - Consider nutrition services referral as needed  Outcome: Progressing     Problem: PAIN - ADULT  Goal: Verbalizes/displays adequate comfort level or baseline comfort level  Description: Interventions:  - Encourage patient to monitor pain and request assistance  - Assess pain using appropriate pain scale  - Administer analgesics based on type and severity of pain and evaluate response  - Implement non-pharmacological measures as appropriate and evaluate response  - Consider cultural and social influences on pain and pain management  - Notify physician/advanced practitioner if interventions unsuccessful or patient reports new pain  Outcome: Progressing

## 2021-09-16 ENCOUNTER — APPOINTMENT (OUTPATIENT)
Dept: INTERVENTIONAL RADIOLOGY/VASCULAR | Facility: HOSPITAL | Age: 56
DRG: 314 | End: 2021-09-16
Attending: INTERNAL MEDICINE
Payer: MEDICARE

## 2021-09-16 PROBLEM — Z60.9 SOCIAL PROBLEM: Status: ACTIVE | Noted: 2021-09-16

## 2021-09-16 PROBLEM — Z65.9 SOCIAL PROBLEM: Status: ACTIVE | Noted: 2021-09-16

## 2021-09-16 LAB
ANION GAP SERPL CALCULATED.3IONS-SCNC: 9 MMOL/L (ref 4–13)
BUN SERPL-MCNC: 19 MG/DL (ref 7–25)
CALCIUM SERPL-MCNC: 9.8 MG/DL (ref 8.6–10.5)
CHLORIDE SERPL-SCNC: 99 MMOL/L (ref 98–107)
CO2 SERPL-SCNC: 28 MMOL/L (ref 21–31)
CREAT SERPL-MCNC: 0.63 MG/DL (ref 0.7–1.3)
ERYTHROCYTE [DISTWIDTH] IN BLOOD BY AUTOMATED COUNT: 14 % (ref 11.5–14.5)
GFR SERPL CREATININE-BSD FRML MDRD: 112 ML/MIN/1.73SQ M
GLUCOSE P FAST SERPL-MCNC: 131 MG/DL (ref 65–99)
GLUCOSE SERPL-MCNC: 105 MG/DL (ref 65–140)
GLUCOSE SERPL-MCNC: 131 MG/DL (ref 65–99)
GLUCOSE SERPL-MCNC: 132 MG/DL (ref 65–140)
GLUCOSE SERPL-MCNC: 158 MG/DL (ref 65–140)
GLUCOSE SERPL-MCNC: 77 MG/DL (ref 65–140)
GLUCOSE SERPL-MCNC: 87 MG/DL (ref 65–140)
HCT VFR BLD AUTO: 35.3 % (ref 42–47)
HGB BLD-MCNC: 11.7 G/DL (ref 14–18)
MAGNESIUM SERPL-MCNC: 1.4 MG/DL (ref 1.9–2.7)
MCH RBC QN AUTO: 31.9 PG (ref 26–34)
MCHC RBC AUTO-ENTMCNC: 33.2 G/DL (ref 31–37)
MCV RBC AUTO: 96 FL (ref 81–99)
PLATELET # BLD AUTO: 408 THOUSANDS/UL (ref 149–390)
PMV BLD AUTO: 7.9 FL (ref 8.6–11.7)
POTASSIUM SERPL-SCNC: 4.3 MMOL/L (ref 3.5–5.5)
RBC # BLD AUTO: 3.68 MILLION/UL (ref 4.3–5.9)
SODIUM SERPL-SCNC: 136 MMOL/L (ref 134–143)
VANCOMYCIN TROUGH SERPL-MCNC: 23.1 UG/ML (ref 5–12)
WBC # BLD AUTO: 6.8 THOUSAND/UL (ref 4.8–10.8)

## 2021-09-16 PROCEDURE — 80048 BASIC METABOLIC PNL TOTAL CA: CPT | Performed by: INTERNAL MEDICINE

## 2021-09-16 PROCEDURE — 36573 INSJ PICC RS&I 5 YR+: CPT | Performed by: RADIOLOGY

## 2021-09-16 PROCEDURE — NC001 PR NO CHARGE: Performed by: RADIOLOGY

## 2021-09-16 PROCEDURE — 82948 REAGENT STRIP/BLOOD GLUCOSE: CPT

## 2021-09-16 PROCEDURE — 36415 COLL VENOUS BLD VENIPUNCTURE: CPT | Performed by: INTERNAL MEDICINE

## 2021-09-16 PROCEDURE — C1751 CATH, INF, PER/CENT/MIDLINE: HCPCS

## 2021-09-16 PROCEDURE — 85027 COMPLETE CBC AUTOMATED: CPT | Performed by: INTERNAL MEDICINE

## 2021-09-16 PROCEDURE — 36569 INSJ PICC 5 YR+ W/O IMAGING: CPT

## 2021-09-16 PROCEDURE — 80202 ASSAY OF VANCOMYCIN: CPT | Performed by: INTERNAL MEDICINE

## 2021-09-16 PROCEDURE — 02HV33Z INSERTION OF INFUSION DEVICE INTO SUPERIOR VENA CAVA, PERCUTANEOUS APPROACH: ICD-10-PCS | Performed by: RADIOLOGY

## 2021-09-16 PROCEDURE — 99226 PR SBSQ OBSERVATION CARE/DAY 35 MINUTES: CPT | Performed by: HOSPITALIST

## 2021-09-16 PROCEDURE — 83735 ASSAY OF MAGNESIUM: CPT | Performed by: INTERNAL MEDICINE

## 2021-09-16 RX ORDER — LIDOCAINE WITH 8.4% SOD BICARB 0.9%(10ML)
SYRINGE (ML) INJECTION CODE/TRAUMA/SEDATION MEDICATION
Status: COMPLETED | OUTPATIENT
Start: 2021-09-16 | End: 2021-09-16

## 2021-09-16 RX ORDER — MAGNESIUM SULFATE HEPTAHYDRATE 40 MG/ML
4 INJECTION, SOLUTION INTRAVENOUS ONCE
Status: COMPLETED | OUTPATIENT
Start: 2021-09-16 | End: 2021-09-16

## 2021-09-16 RX ADMIN — DIVALPROEX SODIUM 1000 MG: 500 TABLET, EXTENDED RELEASE ORAL at 22:03

## 2021-09-16 RX ADMIN — Medication 10 ML: at 14:43

## 2021-09-16 RX ADMIN — INSULIN LISPRO 1 UNITS: 100 INJECTION, SOLUTION INTRAVENOUS; SUBCUTANEOUS at 12:41

## 2021-09-16 RX ADMIN — HYDROXYZINE HYDROCHLORIDE 50 MG: 25 TABLET ORAL at 15:07

## 2021-09-16 RX ADMIN — QUETIAPINE FUMARATE 400 MG: 50 TABLET, EXTENDED RELEASE ORAL at 22:11

## 2021-09-16 RX ADMIN — PRAVASTATIN SODIUM 80 MG: 40 TABLET ORAL at 16:59

## 2021-09-16 RX ADMIN — BENZTROPINE MESYLATE 0.5 MG: 0.5 TABLET ORAL at 22:04

## 2021-09-16 RX ADMIN — TEMAZEPAM 15 MG: 15 CAPSULE ORAL at 22:05

## 2021-09-16 RX ADMIN — BENZTROPINE MESYLATE 0.5 MG: 0.5 TABLET ORAL at 08:59

## 2021-09-16 RX ADMIN — CALCIUM 1 TABLET: 500 TABLET ORAL at 08:59

## 2021-09-16 RX ADMIN — DOCUSATE SODIUM 100 MG: 100 CAPSULE, LIQUID FILLED ORAL at 02:29

## 2021-09-16 RX ADMIN — LEVOTHYROXINE SODIUM 125 MCG: 25 TABLET ORAL at 05:10

## 2021-09-16 RX ADMIN — LISINOPRIL 10 MG: 10 TABLET ORAL at 09:29

## 2021-09-16 RX ADMIN — POLYETHYLENE GLYCOL 3350 17 G: 17 POWDER, FOR SOLUTION ORAL at 09:37

## 2021-09-16 RX ADMIN — LEVETIRACETAM 1500 MG: 500 TABLET ORAL at 17:00

## 2021-09-16 RX ADMIN — LORATADINE 10 MG: 10 TABLET ORAL at 09:29

## 2021-09-16 RX ADMIN — Medication 1000 UNITS: at 09:29

## 2021-09-16 RX ADMIN — VANCOMYCIN HYDROCHLORIDE 1500 MG: 1 INJECTION, POWDER, LYOPHILIZED, FOR SOLUTION INTRAVENOUS at 15:04

## 2021-09-16 RX ADMIN — DOCUSATE SODIUM 100 MG: 100 CAPSULE, LIQUID FILLED ORAL at 17:00

## 2021-09-16 RX ADMIN — LACOSAMIDE 200 MG: 150 TABLET, FILM COATED ORAL at 22:00

## 2021-09-16 RX ADMIN — ENOXAPARIN SODIUM 40 MG: 100 INJECTION SUBCUTANEOUS at 09:38

## 2021-09-16 RX ADMIN — HYDROXYZINE HYDROCHLORIDE 50 MG: 25 TABLET ORAL at 09:29

## 2021-09-16 RX ADMIN — GABAPENTIN 100 MG: 100 CAPSULE ORAL at 15:07

## 2021-09-16 RX ADMIN — MAGNESIUM SULFATE HEPTAHYDRATE 4 G: 40 INJECTION, SOLUTION INTRAVENOUS at 17:46

## 2021-09-16 RX ADMIN — DIVALPROEX SODIUM 500 MG: 500 TABLET, DELAYED RELEASE ORAL at 09:29

## 2021-09-16 RX ADMIN — LEVETIRACETAM 1500 MG: 500 TABLET ORAL at 11:44

## 2021-09-16 RX ADMIN — LACOSAMIDE 200 MG: 150 TABLET, FILM COATED ORAL at 11:44

## 2021-09-16 RX ADMIN — HYDROXYZINE HYDROCHLORIDE 50 MG: 25 TABLET ORAL at 22:00

## 2021-09-16 RX ADMIN — GABAPENTIN 100 MG: 100 CAPSULE ORAL at 22:00

## 2021-09-16 RX ADMIN — VANCOMYCIN HYDROCHLORIDE 1500 MG: 1 INJECTION, POWDER, LYOPHILIZED, FOR SOLUTION INTRAVENOUS at 05:04

## 2021-09-16 RX ADMIN — CALCIUM 1 TABLET: 500 TABLET ORAL at 16:59

## 2021-09-16 RX ADMIN — DOCUSATE SODIUM 100 MG: 100 CAPSULE, LIQUID FILLED ORAL at 09:29

## 2021-09-16 RX ADMIN — GABAPENTIN 100 MG: 100 CAPSULE ORAL at 09:29

## 2021-09-16 RX ADMIN — CALCIUM 1 TABLET: 500 TABLET ORAL at 11:43

## 2021-09-16 NOTE — PROCEDURES
PICC Line Insertion    Date/Time: 9/16/2021 4:54 PM  Performed by: Romana Libel, MD  Authorized by: Romana Libel, MD     Patient location:  IR  Consent:     Consent obtained:  Written    Consent given by:  Patient    Risks discussed:  Arterial puncture, bleeding and infection    Alternatives discussed:  No treatment and delayed treatment  Universal protocol:     Procedure explained and questions answered to patient or proxy's satisfaction: yes      Relevant documents present and verified: yes      Test results available and properly labeled: yes      Radiology Images displayed and confirmed  If images not available, report reviewed: yes      Required blood products, implants, devices, and special equipment available: yes      Site/side marked: yes      Immediately prior to procedure, a time out was called: yes      Patient identity confirmed:  Verbally with patient and arm band  Pre-procedure details:     Hand hygiene: Hand hygiene performed prior to insertion      Sterile barrier technique: All elements of maximal sterile technique followed      Skin preparation:  2% chlorhexidine    Skin preparation agent: Skin preparation agent completely dried prior to procedure    Indications:     PICC line indications: long term antibiotics    Anesthesia (see MAR for exact dosages):      Anesthesia method:  Local infiltration    Local anesthetic:  Lidocaine 1% w/o epi  Procedure details:     Location:  Basilic    Vessel type: vein      Laterality:  Right    Site selection rationale:  Wanted to keep it familiar, the same as last time    Approach: percutaneous technique used      Patient position:  Flat    Procedural supplies:  Single lumen    Catheter size:  4 5 Fr    Landmarks identified: yes      Ultrasound guidance: yes      Sterile ultrasound techniques: Sterile gel and sterile probe covers were used      Number of attempts:  1    Successful placement: yes      Total catheter length (cm):  Forty-five    Catheter out on skin (cm):  0    Max flow rate:  Five    Arm circumference:  Twenty-five  Post-procedure details:     Post-procedure:  Dressing applied and securement device placed    Assessment:  Blood return through all ports and placement verified by x-ray    Post-procedure complications: none      Patient tolerance of procedure:   Tolerated well, no immediate complications  Comments:      Okay to use

## 2021-09-16 NOTE — ASSESSMENT & PLAN NOTE
· Secondary to left gluteal abscess  · Continue local wound care/wound VAC  · Continue antibiotics per Infectious Disease recommendation  · Vancomycin to be continued through 09/22/2021  · PICC line dislodged, will need IR consult in the morning to replace PICC line

## 2021-09-16 NOTE — PROGRESS NOTES
[facility]  Progress Note Ashwini Ley 1965, 49 y o  male MRN: [id number]  Unit/Bed#: -02 Encounter: [id number]  Primary Care Provider: Kathryn Neal PA-C   Date and time admitted to hospital: 9/15/2021  7:08 PM    * S/P PICC central line placement  Assessment & Plan  · Patient was discharged by me yesterday only to return to the ER later last evening after he accidentally removed his PICC line  · Patient seen by Interventional Radiology earlier today, and a replacement PICC line was successfully placed  · See the remaining outlines below    Social problem  Assessment & Plan  · Notified by case management, that the patient's caregiver is unable to take him back home by unspecified reasons  · They are requesting placement  · Prior to placement, the patient will need to be option to via the state  · Patient is otherwise medically stable for discharge  · Will discharge when case management has a final disposition    Gluteal abscess  Assessment & Plan  · Patient returned to the emergency room without the wound VAC that was prescribed for him  · Upon further investigation, the wound VAC is at home with the caregiver  · Unclear as to whether or not the wound VAC was actually applied at home  · Have asked the caregiver to bring the wound VAC back  · Will consult general surgery to follow along    Sepsis [facility])  Assessment & Plan  · Secondary to left gluteal abscess  · Status post an Infectious Disease evaluation on the previous admission  · Status post a completed course of IV cephalosporin  · Continue vancomycin 1500 mg IV t i d   To complete the course  · Final doses scheduled for the evening of 09/22/2021, after which the PICC line can be officially removed    Bacteremia due to Gram-positive bacteria  Assessment & Plan  · Continue vancomycin as outlined above    Essential hypertension  Assessment & Plan  · BP currently stable  · Continue lisinopril    Generalized convulsive epilepsy ([facility] [address] )  Assessment & Plan  · Continue home meds which include Keppra, Restoril, Vimpat and Depakote    Acquired hypothyroidism  Assessment & Plan  · Continue levothyroxine    Hyperlipidemia  Assessment & Plan  · Continue pravastatin    Type 2 diabetes mellitus without complication, without long-term current use of insulin (HCC)  Assessment & Plan  · Oral hypoglycemic medications remain on hold  · Continue Accu-Cheks AC and HS with sliding scale coverage    Hypomagnesemia  Assessment & Plan  · Will replete IV and recheck    Cerebral palsy ([facility] [address] )  Assessment & Plan  · Supportive care        VTE Prophylaxis:  Enoxaparin (Lovenox)    Patient Centered Rounds: I have performed bedside rounds with nursing staff today  Discussions with Specialists or Other Care Team Provider:  Case management, nursing, pharmacy  Education and Discussions with Family / Patient:  Patient's caregiver was brought up to par    Current Length of Stay: 0 day(s)    Current Patient Status: Observation   Certification Statement: The patient will continue to require additional inpatient hospital stay due to The need for placement    Discharge Plan:  Patient is medically stable for discharge, case management has started the process in regards to placement    Code Status: Level 1 - Full Code    Subjective:   Patient seen and examined, resting in bed, is at baseline from a mentation standpoint, has no complaints    Objective:     Vitals:   Temp (24hrs), Av 2 °F (36 2 °C), Min:96 8 °F (36 °C), Max:97 4 °F (36 3 °C)    Temp:  [96 8 °F (36 °C)-97 4 °F (36 3 °C)] 97 3 °F (36 3 °C)  HR:  [] 82  Resp:  [16-17] 17  BP: (109-119)/(66-72) 109/68  SpO2:  [96 %-100 %] 98 %  There is no height or weight on file to calculate BMI  Input and Output Summary (last 24 hours):     No intake or output data in the 24 hours ending 21 1615    Physical Exam:   Physical Exam  Vitals and nursing note reviewed     Constitutional: General: He is not in acute distress  Appearance: Normal appearance  He is not ill-appearing  HENT:      Head: Normocephalic and atraumatic  Nose: Nose normal    Eyes:      Extraocular Movements: Extraocular movements intact  Pupils: Pupils are equal, round, and reactive to light  Cardiovascular:      Rate and Rhythm: Normal rate and regular rhythm  Pulses: Normal pulses  Heart sounds: Normal heart sounds  No murmur heard  No friction rub  No gallop  Pulmonary:      Effort: Pulmonary effort is normal       Breath sounds: Normal breath sounds  Abdominal:      General: There is no distension  Palpations: Abdomen is soft  There is no mass  Tenderness: There is no abdominal tenderness  There is no guarding or rebound  Musculoskeletal:         General: No swelling or tenderness  Normal range of motion  Cervical back: Normal range of motion and neck supple  No rigidity  No muscular tenderness  Right lower leg: No edema  Left lower leg: No edema  Comments: Left gluteal wound dressing is in place, dry, and intact   Skin:     General: Skin is warm  Capillary Refill: Capillary refill takes less than 2 seconds  Findings: No erythema or rash  Neurological:      General: No focal deficit present  Mental Status: He is alert and oriented to person, place, and time  Mental status is at baseline        Comments: Patient is at baseline, he has a history of cerebral palsy   Psychiatric:         Mood and Affect: Mood normal          Behavior: Behavior normal          Additional Data:     Labs:    Results from last 7 days   Lab Units 09/16/21  1343 09/15/21  1948   WBC Thousand/uL 6 80 8 90   HEMOGLOBIN g/dL 11 7* 11 5*   HEMATOCRIT % 35 3* 34 5*   PLATELETS Thousands/uL 408* 404*   NEUTROS PCT %  --  57   LYMPHS PCT %  --  26   MONOS PCT %  --  15*   EOS PCT %  --  1     Results from last 7 days   Lab Units 09/16/21  1343 09/14/21  0512   SODIUM mmol/L 136 137   POTASSIUM mmol/L 4 3 4 4   CHLORIDE mmol/L 99 103   CO2 mmol/L 28 29   BUN mg/dL 19 19   CREATININE mg/dL 0 63* 0 51*   CALCIUM mg/dL 9 8 9 2   ALK PHOS U/L  --  69   ALT U/L  --  66*   AST U/L  --  27         Results from last 7 days   Lab Units 09/16/21  1234 09/16/21  0605 09/16/21  0227 09/15/21  1030 09/15/21  0543 09/14/21  2141 09/14/21  1627 09/14/21  1057 09/14/21  0524 09/13/21 2008 09/13/21  1616 09/13/21  1110   POC GLUCOSE mg/dl 158* 87 105 98 108 99 134 90 119 131 139 118           * I Have Reviewed All Lab Data Listed Above  * Additional Pertinent Lab Tests Reviewed:  [address] Admission  Reviewed    Imaging:  Imaging Reports Reviewed Today Include:  None    Recent Cultures (last 7 days):           Last 24 Hours Medication List:   Current Facility-Administered Medications   Medication Dose Route Frequency Provider Last Rate    acetaminophen  650 mg Oral Q6H PRN Katie Sifuentes MD      benzonatate  200 mg Oral TID PRN Katie Sifuentes MD      benztropine  0 5 mg Oral BID Katie Sifuentes MD      calcium carbonate  1 tablet Oral TID With Juan Coronado MD      cholecalciferol  1,000 Units Oral Daily Katie Sifuentes MD      clonazePAM  0 25 mg Oral Daily PRN Katie Sifuentes MD      divalproex sodium  1,000 mg Oral HS Katie Sifuentes MD      divalproex sodium  500 mg Oral Daily Katie Sifuentes MD      docusate sodium  100 mg Oral BID Katie Sifuentes MD      enoxaparin  40 mg Subcutaneous Daily Katie Sifuentes MD      gabapentin  100 mg Oral TID Katie Sifuentes MD      hydrOXYzine HCL  50 mg Oral TID Katie Sifuentes MD      insulin lispro  1-5 Units Subcutaneous TID Devan Watson MD      insulin lispro  1-5 Units Subcutaneous HS Katie Sifuentes MD      lacosamide  200 mg Oral Q12H Devan Watson MD      levETIRAcetam  1,500 mg Oral BID Joel Hull MD      levothyroxine  125 mcg Oral Early Morning Joel Hull MD      lisinopril  10 mg Oral Daily Joel Hull MD      loratadine  10 mg Oral Daily Joel Hull MD      ondansetron  4 mg Intravenous Q6H PRN Joel Hull MD      polyethylene glycol  17 g Oral Daily Joel Hull MD      pravastatin  80 mg Oral Daily With Amy Oglesby MD      QUEtiapine (SEROquel XR) 200 mg  200 mg Oral BID (AM & Afternoon) Joel Hull MD      QUEtiapine (SEROquel XR) 400 mg  400 mg Oral HS Joel Hull MD      temazepam  15 mg Oral HS Joel Hull MD      vancomycin  1,500 mg Intravenous Corrinne Epstein, MD 0 mg (09/16/21 5269)        Today, Patient Was Seen By: Larry Hernandez MD    ** Please Note: Dictation voice to text software may have been used in the creation of this document   **

## 2021-09-16 NOTE — ASSESSMENT & PLAN NOTE
· Oral hypoglycemic medications remain on hold  · Continue Accu-Cheks AC and HS with sliding scale coverage

## 2021-09-16 NOTE — ASSESSMENT & PLAN NOTE
· Patient was discharged by me yesterday only to return to the ER later last evening after he accidentally removed his PICC line  · Patient seen by Interventional Radiology earlier today, and a replacement PICC line was successfully placed  · See the remaining outlines below

## 2021-09-16 NOTE — PROGRESS NOTES
Vancomycin Assessment    Graeme Barrera is a 49 y o  male who is currently receiving vancomycin 1500 mg IV q8 for MRSA confirmed, skin-soft tissue infection     Relevant clinical data and objective history reviewed:  Creatinine   Date Value Ref Range Status   09/15/2021 0 61 (L) 0 70 - 1 30 mg/dL Final     Comment:     Standardized to IDMS reference method   09/15/2021 0 48 (L) 0 70 - 1 30 mg/dL Final     Comment:     Standardized to IDMS reference method   09/14/2021 0 51 (L) 0 70 - 1 30 mg/dL Final     Comment:     Standardized to IDMS reference method     /72 (BP Location: Left arm)   Pulse 104   Temp (!) 96 8 °F (36 °C) (Temporal)   Resp 16   SpO2 96%   No intake/output data recorded  Lab Results   Component Value Date/Time    BUN 20 09/15/2021 07:48 PM    WBC 8 90 09/15/2021 07:48 PM    HGB 11 5 (L) 09/15/2021 07:48 PM    HCT 34 5 (L) 09/15/2021 07:48 PM    MCV 96 09/15/2021 07:48 PM     (H) 09/15/2021 07:48 PM     Temp Readings from Last 3 Encounters:   09/15/21 (!) 96 8 °F (36 °C) (Temporal)   09/15/21 (!) 96 °F (35 6 °C) (Temporal)   05/18/21 97 9 °F (36 6 °C) (Tympanic)     Vancomycin Days of Therapy: 1    Assessment/Plan  The patient is currently on vancomycin utilizing scheduled dosing based on actual body weight  The patient is currently receiving 1500 mg IV q8 and is clinically appropriate and dose will be continued  Pharmacy will also follow closely for s/sx of nephrotoxicity, infusion reactions, and appropriateness of therapy  BMP and CBC will be ordered per protocol  Plan for trough as patient approaches steady state, prior to the 4th  dose at approximately 2030 on 9/16/21  Due to infection severity, will target a trough of 15-20 (appropriate for most indications)   Pharmacy will continue to follow the patients culture results and clinical progress daily      Melissa Woodall, Pharmacist

## 2021-09-16 NOTE — ED PROVIDER NOTES
History  Chief Complaint   Patient presents with    Medical Problem     pt pulled out picc line     Unfortunate 44-year-old male with infected, abscess, stage IV decubiti had a PICC line placed and was sent back to the group home for home a infusions of IV vancomycin  He was there less than a day and has pulled out the PICC line  He otherwise is is in the received health  He has cerebral palsy  Prior to Admission Medications   Prescriptions Last Dose Informant Patient Reported? Taking?    Blood Glucose Monitoring Suppl (Gumaro Chow) w/Device KIT  Care Giver No No   Sig: by Does not apply route 3 (three) times a day TEST BLOOD SUGARS THREE TIMES   Depakote  MG 24 hr tablet   No No   Sig: TAKE 1 TABLET BY MOUTH IN THE MORNING *BRAND NECESSARY*;TAKE 2 TABLETS BY MOUTH AT BEDTIME   Humidifiers (Cool Mist Humidifier 1 gallon) MISC   No No   Sig: Use daily at bedtime   Insulin Pen Needle (PEN NEEDLES) 31G X 5 MM MISC  Care Giver No No   Sig: by Does not apply route daily   Multiple Vitamin (Daily-Reina) TABS   No No   Sig: TAKE 1 TABLET BY MOUTH DAILY (8AM)   OneTouch Delica Lancets 33F MISC  Care Giver No No   Sig: by Does not apply route daily TEST BLOOD SUGARS THREE TIMES DAILY   QUEtiapine (SEROquel XR) 200 mg 24 hr tablet  Care Giver No No   Sig: TAKE 1 TABLET BY MOUTH TWICE A DAY (8AM,2PM)   QUEtiapine (SEROquel XR) 400 mg 24 hr tablet  Care Giver No No   Sig: TAKE 1 TABLET BY MOUTH AT BEDTIME (8PM) (DX: ANTIPSYCHOTIC)   Stool Softener 100 MG capsule   No No   Sig: TAKE 1 CAPSULE BY MOUTH TWICE A DAY (8AM,8PM) (DX: CONSTIPATION)   Vimpat 200 MG tablet   No No   Sig: TAKE 1 TABLET BY MOUTH EVERY 12 HOURS (DX: EPILEPSY)   benzonatate (TESSALON) 200 MG capsule   No No   Sig: Take 1 capsule (200 mg total) by mouth 3 (three) times a day as needed for cough   benztropine (COGENTIN) 0 5 mg tablet   No No   Sig: TAKE 1 TABLET BY MOUTH TWICE A DAY (8AM,8PM)   calcium carbonate (OYSTER SHELL,OSCAL) 500 mg No No   Sig: TAKE 1 TABLET BY MOUTH 3 TIMES A DAY (8AM,2PM,8PM)   cholecalciferol (VITAMIN D3) 1,000 units tablet   No No   Sig: TAKE 1 TABLET BY MOUTH DAILY (8AM) (DX: VITAMIN DAILY DEFICIENCY)   clonazePAM (KlonoPIN) 0 25 MG disintegrating tablet   No No   Sig: Take 1 tablet (0 25 mg total) by mouth as needed (for clusters of myoclonus   can repeat once after 1 hour if myoclonus continues )   gabapentin (NEURONTIN) 100 mg capsule  Care Giver Yes No   Sig: Take 100 mg by mouth 3 (three) times a day    glucose blood (OneTouch Verio) test strip  Care Giver No No   Sig: TEST BLOOD SUGARS THREE TIMES DAILY   hydrOXYzine pamoate (VISTARIL) 50 mg capsule  Care Giver No No   Sig: TAKE 1 CAPSULE BY MOUTH 3 TIMES A DAY   levETIRAcetam (KEPPRA) 750 mg tablet   No No   Sig: Take 2 tablets (1,500 mg total) by mouth 2 (two) times a day   levothyroxine 125 mcg tablet   No No   Sig: TAKE 1 TABLET BY MOUTH DAILY (8AM) (DX: OTHER SPECIFIED HYPOTHYROIDISM)   lisinopril (ZESTRIL) 10 mg tablet   No No   Sig: TAKE 1 TABLET BY MOUTH DAILY (8AM) (DX: HYPERTENSION)   loratadine (CLARITIN) 10 mg tablet  Care Giver No No   Sig: One tab daily as needed for allergy symptoms   metFORMIN (GLUCOPHAGE) 1000 MG tablet   No No   Sig: TAKE 1 TABLET BY MOUTH TWICE A DAY WITH MEALS (8AM, 8PM) (DX: DIABETES MELLITUS)   polyethylene glycol (MIRALAX) 17 g packet  Care Giver No No   Sig: Take 17 g by mouth daily   simvastatin (ZOCOR) 40 mg tablet   No No   Sig: TAKE 1 TABLET BY MOUTH DAILY (DX: HYPERLIPIDEMIA)   sodium chloride (OCEAN) 0 65 % nasal spray   No No   Si spray into each nostril as needed for congestion   temazepam (RESTORIL) 15 mg capsule   Yes No   Sig: Take 15 mg by mouth daily at bedtime as needed for sleep    temazepam (RESTORIL) 7 5 mg capsule  Care Giver Yes No   Sig: Take 15 mg by mouth daily at bedtime as needed for sleep   triamcinolone (KENALOG) 0 1 % cream  Care Giver No No   Sig: Apply topically 2 (two) times a day   vitamin B-12 (VITAMIN B-12) 1,000 mcg tablet   No No   Sig: TAKE 1 TABLET BY MOUTH EVERY OTHER DAY (8AM) (DX: DEFICIENCY OF OTHER SPECIFIED B GROUP VITAMINS)      Facility-Administered Medications: None       Past Medical History:   Diagnosis Date    [facility] 9/24/2019    [facility]re[facility])     Depression     Diabetes ([place] [address] )     Disease of thyroid gland     Gait disorder     Hyperlipidemia     Hypertension     Kidney failure     Kidney stones     Osteoporosis     Psychiatric disorder     Scoliosis     Seizures ([place] [address] )     Thyroid disease     UTI (urinary tract infection)        Past Surgical History:   Procedure Laterality Date    APPENDECTOMY      IR PICC PLACEMENT SINGLE LUMEN  9/13/2021       History reviewed  No pertinent family history  I have reviewed and agree with the history as documented  E-Cigarette/Vaping    E-Cigarette Use Never User      E-Cigarette/Vaping Substances     Social History     Tobacco Use    Smoking status: Never Smoker    Smokeless tobacco: Never Used   Vaping Use    Vaping Use: Never used   Substance Use Topics    Alcohol use: Never    Drug use: No       Review of Systems   Constitutional: Negative for chills and fever  HENT: Negative for rhinorrhea and sore throat  Eyes: Negative for visual disturbance  Respiratory: Negative for cough and shortness of breath  Cardiovascular: Negative for chest pain and leg swelling  Gastrointestinal: Negative for abdominal pain, diarrhea, nausea and vomiting  Genitourinary: Negative for dysuria  Musculoskeletal: Negative for back pain and myalgias  Skin: Positive for wound  Negative for rash  Gluteal wound could not be evaluated   Neurological: Negative for dizziness and headaches  Psychiatric/Behavioral: Negative for confusion  All other systems reviewed and are negative  Physical Exam  Physical Exam  Vitals and nursing note reviewed     Constitutional:       General: He is not in acute distress  Appearance: Normal appearance  He is well-developed  He is not ill-appearing  Comments: Known cerebral palsy   HENT:      Nose: Nose normal       Mouth/Throat:      Pharynx: No oropharyngeal exudate  Eyes:      General: No scleral icterus  Conjunctiva/sclera: Conjunctivae normal       Pupils: Pupils are equal, round, and reactive to light  Neck:      Vascular: No JVD  Trachea: No tracheal deviation  Cardiovascular:      Rate and Rhythm: Normal rate and regular rhythm  Heart sounds: Normal heart sounds  No murmur heard  Pulmonary:      Effort: Pulmonary effort is normal  No respiratory distress  Breath sounds: Normal breath sounds  No wheezing or rales  Abdominal:      General: Bowel sounds are normal       Palpations: Abdomen is soft  Tenderness: There is no abdominal tenderness  There is no guarding  Musculoskeletal:         General: No tenderness  Normal range of motion  Cervical back: Normal range of motion and neck supple  Skin:     General: Skin is warm and dry  Comments: Reported stage IV gluteal decubitus on the buttock  Patient in the hallway could not view without significant exposure patient  Neurological:      General: No focal deficit present  Mental Status: He is alert  Cranial Nerves: No cranial nerve deficit  Sensory: No sensory deficit  Motor: No abnormal muscle tone        Comments: 5/5 motor, nl sens   Psychiatric:         Behavior: Behavior normal          Vital Signs  ED Triage Vitals [09/15/21 1909]   Temperature Pulse Respirations Blood Pressure SpO2   (!) 96 8 °F (36 °C) 104 16 119/72 96 %      Temp Source Heart Rate Source Patient Position - Orthostatic VS BP Location FiO2 (%)   Temporal Monitor Sitting Left arm --      Pain Score       --           Vitals:    09/15/21 1909   BP: 119/72   Pulse: 104   Patient Position - Orthostatic VS: Sitting         Visual Acuity      ED Medications  Medications   benzonatate (TESSALON PERLES) capsule 200 mg (has no administration in time range)   benztropine (COGENTIN) tablet 0 5 mg (0 5 mg Oral Given 9/15/21 2137)   calcium carbonate (OYSTER SHELL,OSCAL) 500 mg tablet 1 tablet (has no administration in time range)   cholecalciferol (VITAMIN D3) tablet 1,000 Units (has no administration in time range)   clonazePAM (KlonoPIN) tablet 0 25 mg (has no administration in time range)   divalproex sodium (DEPAKOTE ER) 24 hr tablet 1,000 mg (1,000 mg Oral Given 9/15/21 2137)   gabapentin (NEURONTIN) capsule 100 mg (100 mg Oral Given 9/15/21 2137)   hydrOXYzine HCL (ATARAX) tablet 50 mg (50 mg Oral Given 9/15/21 2137)   levETIRAcetam (KEPPRA) tablet 1,500 mg (1,500 mg Oral Given 9/15/21 2228)   levothyroxine tablet 125 mcg (has no administration in time range)   lisinopril (ZESTRIL) tablet 10 mg (has no administration in time range)   polyethylene glycol (MIRALAX) packet 17 g (has no administration in time range)   QUEtiapine (SEROquel XR) 400 mg (400 mg Oral New Bag 9/15/21 2228)   QUEtiapine (SEROquel XR) 200 mg (has no administration in time range)   docusate sodium (COLACE) capsule 100 mg (has no administration in time range)   temazepam (RESTORIL) capsule 15 mg (15 mg Oral Given 9/15/21 2137)   lacosamide (VIMPAT) tablet 200 mg (200 mg Oral Given 9/15/21 2255)   acetaminophen (TYLENOL) tablet 650 mg (has no administration in time range)   ondansetron (ZOFRAN) injection 4 mg (has no administration in time range)   enoxaparin (LOVENOX) subcutaneous injection 40 mg (has no administration in time range)   insulin lispro (HumaLOG) 100 units/mL subcutaneous injection 1-5 Units (has no administration in time range)   insulin lispro (HumaLOG) 100 units/mL subcutaneous injection 1-5 Units (has no administration in time range)   loratadine (CLARITIN) tablet 10 mg (has no administration in time range)   pravastatin (PRAVACHOL) tablet 80 mg (has no administration in time range)   divalproex sodium (DEPAKOTE) EC tablet 500 mg (has no administration in time range)   vancomycin (VANCOCIN) 1500 mg in sodium chloride 0 9% 250 mL IVPB (has no administration in time range)   vancomycin (VANCOCIN) 1500 mg in sodium chloride 0 9% 250 mL IVPB (0 mg Intravenous Stopped 9/15/21 2118)   multi-electrolyte (PLASMALYTE-A/ISOLYTE-S PH 7 4) IV solution (75 mL/hr Intravenous New Bag 9/15/21 2233)       Diagnostic Studies  Results Reviewed     Procedure Component Value Units Date/Time    Basic metabolic panel [[id number]]  (Abnormal) Collected: 09/15/21 1948    Lab Status: Final result Specimen: Blood from Arm, Right Updated: 09/15/21 2015     Sodium 134 mmol/L      Potassium 4 8 mmol/L      Chloride 99 mmol/L      CO2 28 mmol/L      ANION GAP 7 mmol/L      BUN 20 mg/dL      Creatinine 0 61 mg/dL      Glucose 110 mg/dL      Calcium 9 6 mg/dL      eGFR 113 ml/min/1 73sq m     Narrative:      [place] guidelines for Chronic Kidney Disease (CKD):     Stage 1 with normal or high GFR (GFR > 90 mL/min/1 73 square meters)    Stage 2 Mild CKD (GFR = 60-89 mL/min/1 73 square meters)    Stage 3A Moderate CKD (GFR = 45-59 mL/min/1 73 square meters)    Stage 3B Moderate CKD (GFR = 30-44 mL/min/1 73 square meters)    Stage 4 Severe CKD (GFR = 15-29 mL/min/1 73 square meters)    Stage 5 End Stage CKD (GFR <15 mL/min/1 73 square meters)  Note: GFR calculation is accurate only with a steady state creatinine    CBC and differential [[id number]]  (Abnormal) Collected: 09/15/21 1948    Lab Status: Final result Specimen: Blood from Arm, Right Updated: 09/15/21 1958     WBC 8 90 Thousand/uL      RBC 3 59 Million/uL      Hemoglobin 11 5 g/dL      Hematocrit 34 5 %      MCV 96 fL      MCH 32 1 pg      MCHC 33 4 g/dL      RDW 13 8 %      MPV 8 0 fL      Platelets 941 Thousands/uL      Neutrophils Relative 57 %      Lymphocytes Relative 26 %      Monocytes Relative 15 %      Eosinophils Relative 1 %      Basophils Relative 1 %      Neutrophils Absolute 5 00 Thousands/µL      Lymphocytes Absolute 2 30 Thousands/µL      Monocytes Absolute 1 30 Thousand/µL      Eosinophils Absolute 0 10 Thousand/µL      Basophils Absolute 0 10 Thousands/µL                  IR PICC line placement single lumen (preferred for home anitbiotics/medications)    (Results Pending)              Procedures  Procedures         ED Course                                           MDM    Disposition  Final diagnoses:   Decubitus ulcer of buttock, stage 4 (HCC)   Abscess     Time reflects when diagnosis was documented in both MDM as applicable and the Disposition within this note     Time User Action Codes Description Comment    9/15/2021  8:18 PM Dennice Binning Add [L89 90] Decubital ulcer     9/15/2021  8:19 PM Dennice Binning Remove [L89 90] Decubital ulcer     9/15/2021  8:20 PM Dennice Binning Add [M20 198] Decubitus ulcer of buttock, stage 4 ([address] )     9/15/2021  8:20 PM Dennice Binning Add [L02 91] Abscess       ED Disposition     ED Disposition Condition Date/Time Comment    Admit Stable Wed Sep 15, 2021  8:17 PM Case was discussed with Gayla Flores and the patient's admission status was agreed to be Admission Status: observation status to the service of Dr Helen Trujillo   Follow-up Information    None         Patient's Medications   Discharge Prescriptions    No medications on file     No discharge procedures on file      PDMP Review       Value Time User    PDMP Reviewed  Yes 7/15/2021 10:42 AM HUNTER Quinteros          ED Provider  Electronically Signed by           Sotero Dickens DO  09/16/21 0104

## 2021-09-16 NOTE — ASSESSMENT & PLAN NOTE
· Secondary to left gluteal abscess  · Status post an Infectious Disease evaluation on the previous admission  · Status post a completed course of IV cephalosporin  · Continue vancomycin 1500 mg IV t i d   To complete the course  · Final doses scheduled for the evening of 09/22/2021, after which the PICC line can be officially removed

## 2021-09-16 NOTE — CASE MANAGEMENT
Case Management Discharge Planning Note    Patient name Finesse Riggins  Location /-76 MRN [id number]  : 1965 Date 2021       Current Admission Date: 9/15/2021  Current Admission Diagnosis:  S/P PICC central line placement  Previous Admission - Discharge Date:9/15/21   LOS (days): 0  Geometric Mean LOS (GMLOS) (days):   Days to GMLOS: Previous Discharge Diagnosis:  There are no discharge diagnoses documented for the most recent discharge  OBJECTIVE:      Bundle(if applicable):    Current admission status: Observation  Preferred Pharmacy:   [address] [place] [address] [facility]  [address] [place] [address]  Phone: [phone] Fax: [phone]    Primary Care Provider: Vishnu Emanuel PA-C    Primary Insurance: MEDICARE  Secondary Insurance: [place] [employer]    DISCHARGE DETAILS:    Received a TC from [address]  Pt has been approved for hospice  Family would like the Pt to go to a SNF with hospice  Will make referrals for same  Will need transport to a SNF

## 2021-09-16 NOTE — H&P
[facility]  H&P- Velia Rabago 1965, 49 y o  male MRN: [id number]  Unit/Bed#: Z1 H1 Encounter: [id number]  Primary Care Provider: Gokul Durham PA-C   Date and time admitted to hospital: 9/15/2021  7:08 PM    Sepsis [facility])  Assessment & Plan  · Secondary to left gluteal abscess  · Continue local wound care/wound VAC  · Continue antibiotics per Infectious Disease recommendation  · Vancomycin to be continued through 09/22/2021  · PICC line dislodged, will need IR consult in the morning to replace PICC line    Bacteremia due to Gram-positive bacteria  Assessment & Plan  · Continue vancomycin    Gluteal abscess  Assessment & Plan  · Continue management as above  · Continue wound VAC    Acquired hypothyroidism  Assessment & Plan  · Continue levothyroxine    Essential hypertension  Assessment & Plan  · BP currently stable  · Continue home medications    Type 2 diabetes mellitus without complication, without long-term current use of insulin (HCC)  Assessment & Plan  · Hold oral medications for now  · Insulin sliding scale    Cerebral palsy (HCC)  Assessment & Plan  · Supportive care      VTE Prophylaxis: Enoxaparin (Lovenox)  Code Status:  Full code  POLST: There is no POLST form on file for this patient (pre-hospital)  Discussion with family:  Spoke with caretaker at bedside    Anticipated Length of Stay:  Patient will be admitted on an Observation basis with an anticipated length of stay of  < 2 midnights  Justification for Hospital Stay:  [address] line    Chief Complaint:   PICC line removed    History of Present Illness:    Velia Rabago is a 49 y o  male who presents with PICC line removed  Patient was discharged earlier today on IV vancomycin  PICC line was placed earlier this week  Patient has a history of cognitive dysfunction secondary to cerebral palsy  After being discharged today patient pulled out his PICC line accidentally at home    Patient brought back to the ER for further evaluation  Unfortunately IR is unavailable at this time for PICC line replacement  Patient will be admitted for observation and IR evaluation for PICC line tomorrow  Review of Systems:  Review of Systems   Unable to perform ROS: Other   Cerebral palsy    Past Medical and Surgical History:   Past Medical History:   Diagnosis Date    ADRIANA [facility]acute kidney [facility]) ([facility] [address] ) 9/24/2019    CP [facility]cere[facility] palsy) ([facility] )     Depression     Diabetes ([facility] )     Disease of thyroid gland     Gait disorder     Hyperlipidemia     Hypertension     Kidney failure     Kidney stones     Osteoporosis     Psychiatric disorder     Scoliosis     Seizures ([facility] )     Thyroid disease     UTI (urinary tract infection)        Past Surgical History:   Procedure Laterality Date    APPENDECTOMY      IR PICC PLACEMENT SINGLE LUMEN  9/13/2021       Meds/Allergies:  Prior to Admission medications    Medication Sig Start Date End Date Taking? Authorizing Provider   benzonatate (TESSALON) 200 MG capsule Take 1 capsule (200 mg total) by mouth 3 (three) times a day as needed for cough 11/10/20   Demetria Dakins, CRNP   benztropine (COGENTIN) 0 5 mg tablet TAKE 1 TABLET BY MOUTH TWICE A DAY (8AM,8PM) 7/15/21   Matthew Lemus PA-C   Blood Glucose Monitoring Suppl ([facility]) w/Device KIT by Does not apply route 3 (three) times a day TEST BLOOD SUGARS THREE TIMES 7/31/20   Demetria Dakins, CRNP   calcium carbonate (OYSTER SHELL,OSCAL) 500 mg TAKE 1 TABLET BY MOUTH 3 TIMES A DAY (8AM,2PM,8PM) 5/10/21   Matthew Lemus PA-C   cholecalciferol (VITAMIN D3) 1,000 units tablet TAKE 1 TABLET BY MOUTH DAILY (8AM) (DX: VITAMIN DAILY DEFICIENCY) 7/15/21   Matthew Lemus PA-C   clonazePAM (KlonoPIN) 0 25 MG disintegrating tablet Take 1 tablet (0 25 mg total) by mouth as needed (for clusters of myoclonus   can repeat once after 1 hour if myoclonus continues ) 5/25/21   Jovi Adamson MD   Depakote  MG 24 hr tablet TAKE 1 TABLET BY MOUTH IN THE MORNING *BRAND NECESSARY*;TAKE 2 TABLETS BY MOUTH AT BEDTIME 5/14/21   Jovi Adamson MD   gabapentin (NEURONTIN) 100 mg capsule Take 100 mg by mouth 3 (three) times a day  6/26/20   Historical Provider, MD   glucose blood (OneTouch Verio) test strip TEST BLOOD SUGARS THREE TIMES DAILY 7/31/20   Demetria Dakins, CRNP   Humidifiers (Cool Mist Humidifier 1 gallon) MISC Use daily at bedtime 11/10/20 2/17/21  Demetria Dakins, CRNP   hydrOXYzine pamoate (VISTARIL) 50 mg capsule TAKE 1 CAPSULE BY MOUTH 3 TIMES A DAY 3/25/20   Demetria Dakins, CRNP   Insulin Pen Needle (PEN NEEDLES) 31G X 5 MM MISC by Does not apply route daily 7/13/20   Demetria Dakins, CRNP   levETIRAcetam (KEPPRA) 750 mg tablet Take 2 tablets (1,500 mg total) by mouth 2 (two) times a day 1/26/21   Jovi Adamson MD   levothyroxine 125 mcg tablet TAKE 1 TABLET BY MOUTH DAILY (8AM) (DX: OTHER SPECIFIED HYPOTHYROIDISM) 5/10/21   Matthew Lemus PA-C   lisinopril (ZESTRIL) 10 mg tablet TAKE 1 TABLET BY MOUTH DAILY (8AM) (DX: HYPERTENSION) 9/7/21   Dana Ramirez PA-C   loratadine (CLARITIN) 10 mg tablet One tab daily as needed for allergy symptoms 10/29/20   Shaylee Amos PA-C   metFORMIN (GLUCOPHAGE) 1000 MG tablet TAKE 1 TABLET BY MOUTH TWICE A DAY WITH MEALS (8AM, 8PM) (DX: DIABETES MELLITUS) 4/13/21   Matthew Lemus PA-C   Multiple Vitamin (Daily-Reina) TABS TAKE 1 TABLET BY MOUTH DAILY (8AM) 5/10/21   Matthew Lemus PA-C   OneTouch Delica Lancets 81V MISC by Does not apply route daily TEST BLOOD SUGARS THREE TIMES DAILY 7/31/20   Demetria Dakins, CRNP   polyethylene glycol (MIRALAX) 17 g packet Take 17 g by mouth daily 6/18/20   Demetria Dakins, CRNP   QUEtiapine (SEROquel XR) 200 mg 24 hr tablet TAKE 1 TABLET BY MOUTH TWICE A DAY (8AM,2PM) 8/26/19   HUNTER Persaud   QUEtiapine (SEROquel XR) 400 mg 24 hr tablet TAKE 1 TABLET BY MOUTH AT BEDTIME (8PM) (DX: ANTIPSYCHOTIC) 9/17/19   [facility] HUNTER Saldana simvastatin (ZOCOR) 40 mg tablet TAKE 1 TABLET BY MOUTH DAILY (DX: HYPERLIPIDEMIA) 5/10/21   Barbara Maddox PA-C   sodium chloride (OCEAN) 0 65 % nasal spray 1 spray into each nostril as needed for congestion 11/10/20 2/17/21  HUNTER Ibarra   Stool Softener 100 MG capsule TAKE 1 CAPSULE BY MOUTH TWICE A DAY (8AM,8PM) (DX: CONSTIPATION) 4/13/21   Barbara Maddox PA-C   temazepam (RESTORIL) 15 mg capsule Take 15 mg by mouth daily at bedtime as needed for sleep  2/8/21   Historical Provider, MD   temazepam (RESTORIL) 7 5 mg capsule Take 15 mg by mouth daily at bedtime as needed for sleep    Historical Provider, MD   triamcinolone (KENALOG) 0 1 % cream Apply topically 2 (two) times a day 7/13/20   HUNTER Ibarra   Vimpat 200 MG tablet TAKE 1 TABLET BY MOUTH EVERY 12 HOURS (DX: EPILEPSY) 7/15/21   HUNTER Francis   vitamin B-12 (VITAMIN B-12) 1,000 mcg tablet TAKE 1 TABLET BY MOUTH EVERY OTHER DAY (8AM) (DX: DEFICIENCY OF OTHER SPECIFIED B GROUP VITAMINS) 7/26/21   Barbara Maddox PA-C   ibuprofen (MOTRIN) 600 mg tablet Take 1 tablet (600 mg total) by mouth every 8 (eight) hours 7/31/20 9/15/21  HUNTER Ibarra   ketoconazole (NIZORAL) 2 % shampoo Wash hair and sideburns at least 3 times per week 11/19/19 9/15/21  HUNTER Ibarra     I have reviewed home medications with patient personally  Allergies:    Allergies   Allergen Reactions    Erythromycin Other (See Comments)     Unknown per pt    Penicillins Other (See Comments)     Unknown per pt       Social History:  Marital Status: Single   Occupation:  None  Patient Pre-hospital Living Situation:  Home  Patient Pre-hospital Level of Mobility:  Limited  Patient Pre-hospital Diet Restrictions: none  Substance Use History:   Social History     Substance and Sexual Activity   Alcohol Use Never     Social History     Tobacco Use   Smoking Status Never Smoker   Smokeless Tobacco Never Used     Social History     Substance and Sexual Activity   Drug Use No       Family History:  I have reviewed the patients family history    Physical Exam:   Vitals:   Blood Pressure: 119/72 (09/15/21 1909)  Pulse: 104 (09/15/21 1909)  Temperature: (!) 96 8 °F (36 °C) (09/15/21 1909)  Temp Source: Temporal (09/15/21 1909)  Respirations: 16 (09/15/21 1909)  SpO2: 96 % (09/15/21 1909)    Physical Exam  Constitutional:       General: He is not in acute distress  HENT:      Head: Normocephalic and atraumatic  Nose: Nose normal       Mouth/Throat:      Mouth: Mucous membranes are moist    Eyes:      Extraocular Movements: Extraocular movements intact  Conjunctiva/sclera: Conjunctivae normal    Cardiovascular:      Rate and Rhythm: Normal rate and regular rhythm  Pulmonary:      Effort: Pulmonary effort is normal  No respiratory distress  Abdominal:      Palpations: Abdomen is soft  Tenderness: There is no abdominal tenderness  Musculoskeletal:         General: Normal range of motion  Cervical back: Normal range of motion and neck supple  Skin:     General: Skin is warm and dry  Comments: Left gluteal wound with dressing in place   Neurological:      General: No focal deficit present  Mental Status: He is alert  Cranial Nerves: No cranial nerve deficit  Psychiatric:         Mood and Affect: Mood normal          Behavior: Behavior normal          Additional Data:   Lab Results: I have personally reviewed pertinent reports      Results from last 7 days   Lab Units 09/15/21  1948   WBC Thousand/uL 8 90   HEMOGLOBIN g/dL 11 5*   HEMATOCRIT % 34 5*   PLATELETS Thousands/uL 404*   NEUTROS PCT % 57   LYMPHS PCT % 26   MONOS PCT % 15*   EOS PCT % 1     Results from last 7 days   Lab Units 09/15/21  1948 09/14/21  0512   SODIUM mmol/L 134 137   POTASSIUM mmol/L 4 8 4 4   CHLORIDE mmol/L 99 103   CO2 mmol/L 28 29   BUN mg/dL 20 19   CREATININE mg/dL 0 61* 0 51*   CALCIUM mg/dL 9 6 9 2   ALK PHOS U/L  --  69   ALT U/L  --  66* AST U/L  --  27         Results from last 7 days   Lab Units 09/15/21  1030 09/15/21  0543 09/14/21  2141 09/14/21  1627 09/14/21  1057 09/14/21  0524 09/13/21  2008 09/13/21  1616 09/13/21  1110 09/13/21  0539 09/12/21  2107 09/12/21  1533   POC GLUCOSE mg/dl 98 108 99 134 90 119 131 139 118 117 125 134           Imaging: I have personally reviewed pertinent reports  No orders to display       Epic Records Reviewed: Yes     ** Please Note: This note has been constructed using a voice recognition system   **

## 2021-09-16 NOTE — ASSESSMENT & PLAN NOTE
· Patient returned to the emergency room without the wound VAC that was prescribed for him  · Upon further investigation, the wound VAC is at home with the caregiver  · Unclear as to whether or not the wound VAC was actually applied at home  · Have asked the caregiver to bring the wound VAC back  · Will consult general surgery to follow along

## 2021-09-16 NOTE — ASSESSMENT & PLAN NOTE
· Notified by case management, that the patient's caregiver is unable to take him back home by unspecified reasons  · They are requesting placement  · Prior to placement, the patient will need to be option to via the state  · Patient is otherwise medically stable for discharge  · Will discharge when case management has a final disposition

## 2021-09-16 NOTE — PLAN OF CARE
Problem: MOBILITY - ADULT  Goal: Maintain or return to baseline ADL function  Description: INTERVENTIONS:  -  Assess patient's ability to carry out ADLs; assess patient's baseline for ADL function and identify physical deficits which impact ability to perform ADLs (bathing, care of mouth/teeth, toileting, grooming, dressing, etc )  - Assess/evaluate cause of self-care deficits   - Assess range of motion  - Assess patient's mobility; develop plan if impaired  - Assess patient's need for assistive devices and provide as appropriate  - Encourage maximum independence but intervene and supervise when necessary  - Involve family in performance of ADLs  - Assess for home care needs following discharge   - Consider OT consult to assist with ADL evaluation and planning for discharge  - Provide patient education as appropriate  Outcome: Progressing  Goal: Maintains/Returns to pre admission functional level  Description: INTERVENTIONS:  - Perform BMAT or MOVE assessment daily    - Set and communicate daily mobility goal to care team and patient/family/caregiver     - Collaborate with rehabilitation services on mobility goals if consulted  - Out of bed for toileting  - Record patient progress and toleration of activity level   Outcome: Progressing     Problem: PAIN - ADULT  Goal: Verbalizes/displays adequate comfort level or baseline comfort level  Description: Interventions:  - Encourage patient to monitor pain and request assistance  - Assess pain using appropriate pain scale  - Administer analgesics based on type and severity of pain and evaluate response  - Implement non-pharmacological measures as appropriate and evaluate response  - Consider cultural and social influences on pain and pain management  - Notify physician/advanced practitioner if interventions unsuccessful or patient reports new pain  Outcome: Progressing     Problem: INFECTION - ADULT  Goal: Absence or prevention of progression during hospitalization  Description: INTERVENTIONS:  - Assess and monitor for signs and symptoms of infection  - Monitor lab/diagnostic results  - Monitor all insertion sites, i e  indwelling lines, tubes, and drains  - Monitor endotracheal if appropriate and nasal secretions for changes in amount and color  - [place] appropriate cooling/warming therapies per order  - Administer medications as ordered  - Instruct and encourage patient and family to use good hand hygiene technique  - Identify and instruct in appropriate isolation precautions for identified infection/condition  Outcome: Progressing  Goal: Absence of fever/infection during neutropenic period  Description: INTERVENTIONS:  - Monitor WBC    Outcome: Progressing     Problem: SAFETY ADULT  Goal: Maintain or return to baseline ADL function  Description: INTERVENTIONS:  -  Assess patient's ability to carry out ADLs; assess patient's baseline for ADL function and identify physical deficits which impact ability to perform ADLs (bathing, care of mouth/teeth, toileting, grooming, dressing, etc )  - Assess/evaluate cause of self-care deficits   - Assess range of motion  - Assess patient's mobility; develop plan if impaired  - Assess patient's need for assistive devices and provide as appropriate  - Encourage maximum independence but intervene and supervise when necessary  - Involve family in performance of ADLs  - Assess for home care needs following discharge   - Consider OT consult to assist with ADL evaluation and planning for discharge  - Provide patient education as appropriate  Outcome: Progressing  Goal: Maintains/Returns to pre admission functional level  Description: INTERVENTIONS:  - Perform BMAT or MOVE assessment daily    - Set and communicate daily mobility goal to care team and patient/family/caregiver     - Collaborate with rehabilitation services on mobility goals if consulted  - Out of bed for toileting  - Record patient progress and toleration of activity level   Outcome: Progressing  Goal: Patient will remain free of falls  Description: INTERVENTIONS:  - Educate patient/family on patient safety including physical limitations  - Instruct patient to call for assistance with activity   - Consult OT/PT to assist with strengthening/mobility   - Keep Call bell within reach  - Keep bed low and locked with side rails adjusted as appropriate  - Keep care items and personal belongings within reach  - Initiate and maintain comfort rounds  - Make Fall Risk Sign visible to staff  - Apply yellow socks and bracelet for high fall risk patients  - Consider moving patient to room near nurses station  Outcome: Progressing     Problem: DISCHARGE PLANNING  Goal: Discharge to home or other facility with appropriate resources  Description: INTERVENTIONS:  - Identify barriers to discharge w/patient and caregiver  - Arrange for needed discharge resources and transportation as appropriate  - Identify discharge learning needs (meds, wound care, etc )  - Arrange for interpretive services to assist at discharge as needed  - Refer to Case Management Department for coordinating discharge planning if the patient needs post-hospital services based on physician/advanced practitioner order or complex needs related to functional status, cognitive ability, or social support system  Outcome: Progressing     Problem: Knowledge Deficit  Goal: Patient/family/caregiver demonstrates understanding of disease process, treatment plan, medications, and discharge instructions  Description: Complete learning assessment and assess knowledge base    Interventions:  - Provide teaching at level of understanding  - Provide teaching via preferred learning methods  Outcome: Progressing

## 2021-09-17 LAB
ANION GAP SERPL CALCULATED.3IONS-SCNC: 6 MMOL/L (ref 4–13)
BASOPHILS # BLD AUTO: 0.1 THOUSANDS/ΜL (ref 0–0.1)
BASOPHILS NFR BLD AUTO: 1 % (ref 0–2)
BUN SERPL-MCNC: 19 MG/DL (ref 7–25)
CALCIUM SERPL-MCNC: 9.4 MG/DL (ref 8.6–10.5)
CHLORIDE SERPL-SCNC: 99 MMOL/L (ref 98–107)
CO2 SERPL-SCNC: 29 MMOL/L (ref 21–31)
CREAT SERPL-MCNC: 0.56 MG/DL (ref 0.7–1.3)
EOSINOPHIL # BLD AUTO: 0.1 THOUSAND/ΜL (ref 0–0.61)
EOSINOPHIL NFR BLD AUTO: 1 % (ref 0–5)
ERYTHROCYTE [DISTWIDTH] IN BLOOD BY AUTOMATED COUNT: 14 % (ref 11.5–14.5)
GFR SERPL CREATININE-BSD FRML MDRD: 117 ML/MIN/1.73SQ M
GLUCOSE SERPL-MCNC: 102 MG/DL (ref 65–140)
GLUCOSE SERPL-MCNC: 116 MG/DL (ref 65–140)
GLUCOSE SERPL-MCNC: 126 MG/DL (ref 65–99)
GLUCOSE SERPL-MCNC: 176 MG/DL (ref 65–140)
GLUCOSE SERPL-MCNC: 232 MG/DL (ref 65–140)
HCT VFR BLD AUTO: 32.8 % (ref 42–47)
HGB BLD-MCNC: 11.2 G/DL (ref 14–18)
LYMPHOCYTES # BLD AUTO: 2.4 THOUSANDS/ΜL (ref 0.6–4.47)
LYMPHOCYTES NFR BLD AUTO: 27 % (ref 21–51)
MAGNESIUM SERPL-MCNC: 2 MG/DL (ref 1.9–2.7)
MCH RBC QN AUTO: 32.6 PG (ref 26–34)
MCHC RBC AUTO-ENTMCNC: 34.1 G/DL (ref 31–37)
MCV RBC AUTO: 96 FL (ref 81–99)
MONOCYTES # BLD AUTO: 1.3 THOUSAND/ΜL (ref 0.17–1.22)
MONOCYTES NFR BLD AUTO: 14 % (ref 2–12)
NEUTROPHILS # BLD AUTO: 5 THOUSANDS/ΜL (ref 1.4–6.5)
NEUTS SEG NFR BLD AUTO: 56 % (ref 42–75)
PLATELET # BLD AUTO: 362 THOUSANDS/UL (ref 149–390)
PMV BLD AUTO: 8.4 FL (ref 8.6–11.7)
POTASSIUM SERPL-SCNC: 4.7 MMOL/L (ref 3.5–5.5)
RBC # BLD AUTO: 3.44 MILLION/UL (ref 4.3–5.9)
SODIUM SERPL-SCNC: 134 MMOL/L (ref 134–143)
WBC # BLD AUTO: 8.9 THOUSAND/UL (ref 4.8–10.8)

## 2021-09-17 PROCEDURE — 99222 1ST HOSP IP/OBS MODERATE 55: CPT | Performed by: SURGERY

## 2021-09-17 PROCEDURE — 99232 SBSQ HOSP IP/OBS MODERATE 35: CPT | Performed by: HOSPITALIST

## 2021-09-17 PROCEDURE — 82948 REAGENT STRIP/BLOOD GLUCOSE: CPT

## 2021-09-17 PROCEDURE — 83735 ASSAY OF MAGNESIUM: CPT | Performed by: HOSPITALIST

## 2021-09-17 PROCEDURE — 85025 COMPLETE CBC W/AUTO DIFF WBC: CPT | Performed by: HOSPITALIST

## 2021-09-17 PROCEDURE — 80048 BASIC METABOLIC PNL TOTAL CA: CPT | Performed by: HOSPITALIST

## 2021-09-17 RX ORDER — QUETIAPINE 150 MG/1
150 TABLET, FILM COATED, EXTENDED RELEASE ORAL
Status: DISCONTINUED | OUTPATIENT
Start: 2021-09-17 | End: 2021-09-22 | Stop reason: HOSPADM

## 2021-09-17 RX ORDER — QUETIAPINE FUMARATE 50 MG/1
50 TABLET, EXTENDED RELEASE ORAL
Status: DISCONTINUED | OUTPATIENT
Start: 2021-09-17 | End: 2021-09-22 | Stop reason: HOSPADM

## 2021-09-17 RX ADMIN — LORATADINE 10 MG: 10 TABLET ORAL at 09:34

## 2021-09-17 RX ADMIN — LEVETIRACETAM 1500 MG: 500 TABLET ORAL at 17:10

## 2021-09-17 RX ADMIN — QUETIAPINE FUMARATE 400 MG: 50 TABLET, EXTENDED RELEASE ORAL at 21:13

## 2021-09-17 RX ADMIN — PRAVASTATIN SODIUM 80 MG: 40 TABLET ORAL at 17:10

## 2021-09-17 RX ADMIN — GABAPENTIN 100 MG: 100 CAPSULE ORAL at 17:11

## 2021-09-17 RX ADMIN — INSULIN LISPRO 2 UNITS: 100 INJECTION, SOLUTION INTRAVENOUS; SUBCUTANEOUS at 12:44

## 2021-09-17 RX ADMIN — LISINOPRIL 10 MG: 10 TABLET ORAL at 09:34

## 2021-09-17 RX ADMIN — INSULIN LISPRO 1 UNITS: 100 INJECTION, SOLUTION INTRAVENOUS; SUBCUTANEOUS at 23:41

## 2021-09-17 RX ADMIN — LACOSAMIDE 200 MG: 150 TABLET, FILM COATED ORAL at 09:34

## 2021-09-17 RX ADMIN — ENOXAPARIN SODIUM 40 MG: 100 INJECTION SUBCUTANEOUS at 09:33

## 2021-09-17 RX ADMIN — DIVALPROEX SODIUM 500 MG: 500 TABLET, DELAYED RELEASE ORAL at 09:34

## 2021-09-17 RX ADMIN — ACETAMINOPHEN 650 MG: 325 TABLET ORAL at 21:17

## 2021-09-17 RX ADMIN — BENZTROPINE MESYLATE 0.5 MG: 0.5 TABLET ORAL at 09:33

## 2021-09-17 RX ADMIN — CALCIUM 1 TABLET: 500 TABLET ORAL at 12:47

## 2021-09-17 RX ADMIN — HYDROXYZINE HYDROCHLORIDE 50 MG: 25 TABLET ORAL at 21:05

## 2021-09-17 RX ADMIN — QUETIAPINE FUMARATE 150 MG: 150 TABLET, EXTENDED RELEASE ORAL at 10:46

## 2021-09-17 RX ADMIN — DIVALPROEX SODIUM 1000 MG: 500 TABLET, EXTENDED RELEASE ORAL at 21:06

## 2021-09-17 RX ADMIN — CALCIUM 1 TABLET: 500 TABLET ORAL at 17:10

## 2021-09-17 RX ADMIN — DOCUSATE SODIUM 100 MG: 100 CAPSULE, LIQUID FILLED ORAL at 17:10

## 2021-09-17 RX ADMIN — HYDROXYZINE HYDROCHLORIDE 50 MG: 25 TABLET ORAL at 09:34

## 2021-09-17 RX ADMIN — DOCUSATE SODIUM 100 MG: 100 CAPSULE, LIQUID FILLED ORAL at 09:34

## 2021-09-17 RX ADMIN — TEMAZEPAM 15 MG: 15 CAPSULE ORAL at 21:06

## 2021-09-17 RX ADMIN — GABAPENTIN 100 MG: 100 CAPSULE ORAL at 21:06

## 2021-09-17 RX ADMIN — CALCIUM 1 TABLET: 500 TABLET ORAL at 09:34

## 2021-09-17 RX ADMIN — LEVETIRACETAM 1500 MG: 500 TABLET ORAL at 09:33

## 2021-09-17 RX ADMIN — LACOSAMIDE 200 MG: 150 TABLET, FILM COATED ORAL at 21:06

## 2021-09-17 RX ADMIN — BENZTROPINE MESYLATE 0.5 MG: 0.5 TABLET ORAL at 21:06

## 2021-09-17 RX ADMIN — LEVOTHYROXINE SODIUM 125 MCG: 25 TABLET ORAL at 06:13

## 2021-09-17 RX ADMIN — VANCOMYCIN HYDROCHLORIDE 1750 MG: 1 INJECTION, POWDER, LYOPHILIZED, FOR SOLUTION INTRAVENOUS at 04:41

## 2021-09-17 RX ADMIN — GABAPENTIN 100 MG: 100 CAPSULE ORAL at 09:34

## 2021-09-17 RX ADMIN — VANCOMYCIN HYDROCHLORIDE 1750 MG: 1 INJECTION, POWDER, LYOPHILIZED, FOR SOLUTION INTRAVENOUS at 16:59

## 2021-09-17 RX ADMIN — POLYETHYLENE GLYCOL 3350 17 G: 17 POWDER, FOR SOLUTION ORAL at 09:33

## 2021-09-17 RX ADMIN — QUETIAPINE FUMARATE 50 MG: 50 TABLET, EXTENDED RELEASE ORAL at 10:47

## 2021-09-17 RX ADMIN — HYDROXYZINE HYDROCHLORIDE 50 MG: 25 TABLET ORAL at 17:11

## 2021-09-17 RX ADMIN — Medication 1000 UNITS: at 09:34

## 2021-09-17 NOTE — ASSESSMENT & PLAN NOTE
· Secondary to left gluteal abscess  · Status post an Infectious Disease evaluation on the previous admission  · Status post a completed course of IV cephalosporin  · Continue vancomycin 1500 mg IV t i d   To complete the course  · Final dose is scheduled for the evening of 09/22/2021, after which the PICC line can be officially removed

## 2021-09-17 NOTE — ASSESSMENT & PLAN NOTE
· Patient was discharged and readmitted on the same day after he pulled his PICC line out  · PICC line was successfully replaced on 09/16/2021 by IR  · Patient remains medically stable for discharge  · See the remaining outlines below

## 2021-09-17 NOTE — PROGRESS NOTES
[facility]  Progress Note Gabriella Khan 1965, 49 y o  male MRN: [id number]  Unit/Bed#: -02 Encounter: [id number]  Primary Care Provider: Brook Smith PA-C   Date and time admitted to hospital: 9/15/2021  7:08 PM    * S/P PICC central line placement  Assessment & Plan  · Patient was discharged and readmitted on the same day after he pulled his PICC line out  · PICC line was successfully replaced on 09/16/2021 by IR  · Patient remains medically stable for discharge  · See the remaining outlines below    Social problem  Assessment & Plan  · Notified by case management, that the patient's caregiver is unable to take him back home by unspecified reasons  · They are requesting placement  · Prior to placement, the patient will need to be optioned via the state  · Patient is otherwise medically stable for discharge  · Will discharge when case management has a final disposition    Gluteal abscess  Assessment & Plan    · Continue IV vancomycin  · Appreciate general surgical input  · Continue wound VAC    Sepsis ([facility] )  Assessment & Plan  · Secondary to left gluteal abscess  · Status post an Infectious Disease evaluation on the previous admission  · Status post a completed course of IV cephalosporin  · Continue vancomycin 1500 mg IV t i d   To complete the course  · Final dose is scheduled for the evening of 09/22/2021, after which the PICC line can be officially removed    Bacteremia due to Gram-positive bacteria  Assessment & Plan  · Continue vancomycin as outlined above    Essential hypertension  Assessment & Plan  · BP currently stable  · Continue lisinopril    Generalized convulsive epilepsy ([facility] )  Assessment & Plan  · Continue home meds which include Keppra, Restoril, Vimpat and Depakote    Acquired hypothyroidism  Assessment & Plan  · Continue levothyroxine    Hyperlipidemia  Assessment & Plan  · Continue pravastatin    Type 2 diabetes mellitus without complication, without long-term current use of insulin (HCC)  Assessment & Plan  · Oral hypoglycemic medications remain on hold  · Continue Accu-Cheks AC and HS with sliding scale coverage    Hypomagnesemia  Assessment & Plan  · Resolved with repletion    Cerebral palsy (HCC)  Assessment & Plan  · Supportive care      VTE Prophylaxis:  Enoxaparin (Lovenox)    Patient Centered Rounds: I have performed bedside rounds with nursing staff today  Discussions with Specialists or Other Care Team Provider:  Case management, General surgery, nursing, pharmacy  Education and Discussions with Family / Patient:  Patient's sister Hyun Tucker was brought up to par    Current Length of Stay: 1 day(s)    Current Patient Status: Inpatient   Certification Statement: The patient will continue to require additional inpatient hospital stay due to The need for placement    Discharge Plan:  Patient is medically stable for discharge    Code Status: Level 1 - Full Code    Subjective:   Patient seen and examined, resting in bed  Appears comfortable  Objective:     Vitals:   Temp (24hrs), Av 8 °F (36 6 °C), Min:97 3 °F (36 3 °C), Max:98 3 °F (36 8 °C)    Temp:  [97 3 °F (36 3 °C)-98 3 °F (36 8 °C)] 98 3 °F (36 8 °C)  HR:  [76-82] 76  Resp:  [17-18] 18  BP: (109-139)/(56-74) 139/74  SpO2:  [97 %-98 %] 97 %  Body mass index is 22 25 kg/m²  Input and Output Summary (last 24 hours): Intake/Output Summary (Last 24 hours) at 2021 1438  Last data filed at 2021 1746  Gross per 24 hour   Intake 460 ml   Output 650 ml   Net -190 ml       Physical Exam:   Physical Exam  Vitals and nursing note reviewed  Constitutional:       General: He is not in acute distress  Appearance: Normal appearance  He is not ill-appearing  HENT:      Head: Normocephalic and atraumatic  Nose: Nose normal    Eyes:      Extraocular Movements: Extraocular movements intact  Pupils: Pupils are equal, round, and reactive to light     Cardiovascular: Rate and Rhythm: Normal rate and regular rhythm  Pulses: Normal pulses  Heart sounds: Normal heart sounds  No murmur heard  No friction rub  No gallop  Pulmonary:      Effort: Pulmonary effort is normal       Breath sounds: Normal breath sounds  Abdominal:      General: There is no distension  Palpations: Abdomen is soft  There is no mass  Tenderness: There is no abdominal tenderness  There is no guarding or rebound  Musculoskeletal:         General: No swelling or tenderness  Normal range of motion  Cervical back: Normal range of motion and neck supple  No rigidity  No muscular tenderness  Right lower leg: No edema  Left lower leg: No edema  Skin:     General: Skin is warm  Capillary Refill: Capillary refill takes less than 2 seconds  Findings: No erythema or rash  Neurological:      General: No focal deficit present  Mental Status: He is alert  Mental status is at baseline        Comments: Patient is at baseline in the setting of having cerebral palsy   Psychiatric:         Mood and Affect: Mood normal          Behavior: Behavior normal          Additional Data:     Labs:    Results from last 7 days   Lab Units 09/17/21  0458   WBC Thousand/uL 8 90   HEMOGLOBIN g/dL 11 2*   HEMATOCRIT % 32 8*   PLATELETS Thousands/uL 362   NEUTROS PCT % 56   LYMPHS PCT % 27   MONOS PCT % 14*   EOS PCT % 1     Results from last 7 days   Lab Units 09/17/21  0458 09/14/21  0512   SODIUM mmol/L 134 137   POTASSIUM mmol/L 4 7 4 4   CHLORIDE mmol/L 99 103   CO2 mmol/L 29 29   BUN mg/dL 19 19   CREATININE mg/dL 0 56* 0 51*   CALCIUM mg/dL 9 4 9 2   ALK PHOS U/L  --  69   ALT U/L  --  66*   AST U/L  --  27         Results from last 7 days   Lab Units 09/17/21  1119 09/17/21  0615 09/16/21  2054 09/16/21  1618 09/16/21  1234 09/16/21  0605 09/16/21  0227 09/15/21  1030 09/15/21  0543 09/14/21  2141 09/14/21  1627 09/14/21  1057   POC GLUCOSE mg/dl 232* 102 132 77 158* 87 105 98 108 99 134 90           * I Have Reviewed All Lab Data Listed Above  * Additional Pertinent Lab Tests Reviewed:  [facility] Admission  Reviewed    Imaging:  Imaging Reports Reviewed Today Include:  None    Recent Cultures (last 7 days):           Last 24 Hours Medication List:   Current Facility-Administered Medications   Medication Dose Route Frequency Provider Last Rate    acetaminophen  650 mg Oral Q6H PRN Mariah Metz MD      benzonatate  200 mg Oral TID PRN Mariah Metz MD      benztropine  0 5 mg Oral BID Mariah Metz MD      calcium carbonate  1 tablet Oral TID With Wilfred Lanes, MD      cholecalciferol  1,000 Units Oral Daily Mariah Metz MD      clonazePAM  0 25 mg Oral Daily PRN Mariah Metz MD      divalproex sodium  1,000 mg Oral HS Mariah Metz MD      divalproex sodium  500 mg Oral Daily Mariah Metz MD      docusate sodium  100 mg Oral BID Mariah Metz MD      enoxaparin  40 mg Subcutaneous Daily Mariah Metz MD      gabapentin  100 mg Oral TID Mariah Metz MD      hydrOXYzine HCL  50 mg Oral TID Mariah Metz MD      insulin lispro  1-5 Units Subcutaneous TID Godfrey Caba MD      insulin lispro  1-5 Units Subcutaneous HS Mariah Metz MD      lacosamide  200 mg Oral Q12H Godfrey Caba MD      levETIRAcetam  1,500 mg Oral BID Mariah Metz MD      levothyroxine  125 mcg Oral Early Morning Mariah Metz MD      lisinopril  10 mg Oral Daily Mariah Metz MD      loratadine  10 mg Oral Daily Mariah Metz MD      ondansetron  4 mg Intravenous Q6H PRN Mariah Metz MD      polyethylene glycol  17 g Oral Daily Mariah Metz MD      pravastatin  80 mg Oral Daily With Amy Oglesby MD      QUEtiapine  50 mg Oral BID (AM & Afternoon) Brandon Claudio MD      And    QUEtiapine  150 mg Oral BID (AM & Afternoon) Brandon Claudio MD      QUEtiapine (SEROquel XR) 400 mg  400 mg Oral HS Brandon Claudio MD      temazepam  15 mg Oral HS Brandon Claudio MD      vancomycin  1,750 mg Intravenous Q12H Brandon Claudio MD 1,750 mg (09/17/21 0441)        Today, Patient Was Seen By: Annemarie Morris MD    ** Please Note: Dictation voice to text software may have been used in the creation of this document   **

## 2021-09-17 NOTE — PHYSICIAN ADVISOR
Current patient class: Observation  The patient is currently on Hospital Day: 2 at [address]      This patient was originally admitted to the hospital under observation status  After admission the patient was reevaluated and determined to require further hospitalization  The patient is now documented to require at least a 2nd midnight in the hospital  As such the patient is now expected to satisfy the 2 midnight benchmark and is therefore eligible for inpatient admission  After review of the relevant documentation, labs, vital signs and test results, the patient is appropriate for INPATIENT ADMISSION  Admission to the hospital as an inpatient is a complex decision making process which requires the practitioner to consider the patients presenting complaint, history and physical examination and all relevant testing  With this in mind, in this case, the patient was deemed appropriate for INPATIENT ADMISSION  After review of the documentation and testing available at the time of the admission I concur with this clinical determination of medical necessity  Rationale is as follows: The patient is a 49 yrs Male who presented to the ED at 9/15/2021  7:08 PM with a chief complaint of Medical Problem (pt pulled out picc line)   Pt with history of cerebral palsy  Patient discharged 9/14/21 after admission for sepsis and bacteremia due to gluteal ulcer and MRSA + blood cultures  He had a wound vac placed in gluteal wound and PICC placed for Vancomycin IV to continue until 9/22/21  After discharged, his picc became dislodged  His caregiver at home is unable to care for him  He was admitted to have picc replaced and continuing wound care  Picc was replaced today and general surgery is consulted for continuing wound care  He is receiving IV vancomycin   is arranging discharge to SNF with hospice    Given his continued need for IV antibiotics and wound care, he is inpatient appropriate      The patients vitals on arrival were   ED Triage Vitals   Temperature Pulse Respirations Blood Pressure SpO2   09/15/21 1909 09/15/21 1909 09/15/21 1909 09/15/21 1909 09/15/21 1909   (!) 96 8 °F (36 °C) 104 16 119/72 96 %      Temp Source Heart Rate Source Patient Position - Orthostatic VS BP Location FiO2 (%)   09/15/21 1909 09/15/21 1909 09/15/21 1909 09/15/21 1909 --   Temporal Monitor Sitting Left arm       Pain Score       09/16/21 1704       No Pain           Past Medical History:   Diagnosis Date    ADRIANA (acute kidney injury) ([facility] ) 9/24/2019    CP (cerebral palsy) ([facility] )     Depression     Diabetes ([facility] )     Disease of thyroid gland     Gait disorder     Hyperlipidemia     Hypertension     Kidney failure     Kidney stones     Osteoporosis     Psychiatric disorder     Scoliosis     Seizures ([facility] )     Thyroid disease     UTI (urinary tract infection)      Past Surgical History:   Procedure Laterality Date    APPENDECTOMY      IR PICC PLACEMENT SINGLE LUMEN  9/13/2021    IR PICC PLACEMENT SINGLE LUMEN  9/16/2021       The patient was admitted to the hospital at N/A on N/A for the following diagnosis:  Abscess [L02 91]  Decubitus ulcer of buttock, stage 4 ([facility] ) [L89 304]  [facility] [T82 598A]    Consults have been placed to:   IP CONSULT TO PHARMACY  IP CONSULT TO ACUTE CARE SURGERY    Vitals:    09/15/21 1909 09/16/21 0932 09/16/21 1557   BP: 119/72 109/66 109/68   BP Location: Left arm Left arm Left arm   Pulse: 104 84 82   Resp: 16 16 17   Temp: (!) 96 8 °F (36 °C) (!) 97 4 °F (36 3 °C) (!) 97 3 °F (36 3 °C)   TempSrc: Temporal Tympanic Temporal   SpO2: 96% 100% 98%   Weight:   74 4 kg (164 lb 0 4 oz)   Height:   6' (1 829 m)       Most recent labs:    Recent Labs     09/14/21  0512 09/16/21  1343   WBC 10 20 6 80   HGB 10 4* 11 7*   HCT 31 8* 35 3*   * 408*   K 4 4 4 3   CALCIUM 9 2 9 8   BUN 19 19   CREATININE 0 51* 0 63*   AST 27  --    ALT 66*  --    ALKPHOS 69  --        Scheduled Meds:  Current Facility-Administered Medications   Medication Dose Route Frequency Provider Last Rate    acetaminophen  650 mg Oral Q6H PRN Adriel Lal MD      benzonatate  200 mg Oral TID PRN Adriel Lal MD      benztropine  0 5 mg Oral BID Adriel Lal MD      calcium carbonate  1 tablet Oral TID With Meals Adriel Lal MD      cholecalciferol  1,000 Units Oral Daily Adriel Lal MD      clonazePAM  0 25 mg Oral Daily PRN Adriel Lal MD      divalproex sodium  1,000 mg Oral HS Adriel Lal MD      divalproex sodium  500 mg Oral Daily Adriel Lal MD      docusate sodium  100 mg Oral BID Adriel Lal MD      enoxaparin  40 mg Subcutaneous Daily Adriel Lal MD      gabapentin  100 mg Oral TID Adriel Lal MD      hydrOXYzine HCL  50 mg Oral TID Adriel Lal MD      insulin lispro  1-5 Units Subcutaneous TID [facility] Adriel Lal MD      insulin lispro  1-5 Units Subcutaneous HS Adriel Lal MD      lacosamide  200 mg Oral Q12H Ezekiel Peters MD      levETIRAcetam  1,500 mg Oral BID Adriel Lal MD      levothyroxine  125 mcg Oral Early Morning Adriel Lal MD      lisinopril  10 mg Oral Daily Adriel Lal MD      loratadine  10 mg Oral Daily Adriel Lal MD      ondansetron  4 mg Intravenous Q6H PRN Adriel Lal MD      polyethylene glycol  17 g Oral Daily Adriel Lal MD      pravastatin  80 mg Oral Daily With Douglas Gautam MD      QUEtiapine (SEROquel XR) 200 mg  200 mg Oral BID (AM & Afternoon) Adriel Lal MD      QUEtiapine (SEROquel XR) 400 mg  400 mg Oral HS Adriel Lal MD      temazepam  15 mg Oral HS Adriel Lal MD      vancomycin 1,500 mg Intravenous Zahira Gregory MD 0 mg (09/16/21 5313)     Continuous Infusions:   PRN Meds:   acetaminophen    benzonatate    clonazePAM    ondansetron    Surgical procedures (if appropriate):

## 2021-09-17 NOTE — PROGRESS NOTES
Vancomycin IV Pharmacy-to-Dose Consultation    Rito Yang is a 49 y o  male who is currently receiving Vancomycin IV with management by the Pharmacy Consult service  Assessment/Plan:  The patient was reviewed  Renal function is stable and no signs or symptoms of nephrotoxicity and/or infusion reactions were documented in the chart  Based on todays assessment, continue current vancomycin (day # 3) dosing of 1750 mg iv q 12 hrs, with a plan for trough to be drawn at 1530 on 09/18  We will continue to follow the patients culture results and clinical progress daily      Theo Severs, Pharmacist

## 2021-09-17 NOTE — CONSULTS
Consultation - General Surgery   Rufino Jiang 49 y o  male MRN: [id number]  Unit/Bed#: -02 Encounter: [id number]    Assessment/Plan     Assessment:  The patient is a pleasant 56-year-old male with past medical history significant for cerebral palsy with a stage III sacral decubitus ulcer  Plan:  Orders entered in the computer record for re-application of the wound VAC to be applied to 125 mm of continuous negative pressure  New    Thank you for the consultation and for the privilege of assisting in care of this pleasant gentleman  Patient's sister was updated at the bedside  History of Present Illness   HPI:  Rufino Jiang is a 49 y o  male who presents with stage III sacral ulcer  The patient was readmitted following the inadvertent removal of his PICC line  Inpatient consult to Acute Care Surgery  Consult performed by: Chapin Coleman MD  Consult ordered by: Neena Cason MD          Review of Systems   Constitutional: Negative for chills and fever  HENT: Negative for ear pain and sore throat  Eyes: Negative for pain and visual disturbance  Respiratory: Negative for cough and shortness of breath  Cardiovascular: Negative for chest pain and palpitations  Gastrointestinal: Negative for abdominal pain and vomiting  Genitourinary: Negative for dysuria and hematuria  Musculoskeletal: Negative for arthralgias and back pain  Skin: Negative for color change and rash  Neurological: Negative for seizures and syncope  All other systems reviewed and are negative        Historical Information   Past Medical History:   Diagnosis Date    AD[facility]ANA [facility] injury) ([facility] ) 9/24/2019    [facility]     Depression     Diabetes ([facility] )     Disease of thyroid gland     Gait disorder     Hyperlipidemia     Hypertension     Kidney failure     Kidney stones     Osteoporosis     Psychiatric disorder     Scoliosis     Seizures ([facility] )     Thyroid disease     UTI (urinary tract infection)      Past Surgical History:   Procedure Laterality Date    APPENDECTOMY      IR PICC PLACEMENT SINGLE LUMEN  9/13/2021    IR PICC PLACEMENT SINGLE LUMEN  9/16/2021     Social History   Social History     Substance and Sexual Activity   Alcohol Use Never     Social History     Substance and Sexual Activity   Drug Use No     E-Cigarette/Vaping    E-Cigarette Use Never User      E-Cigarette/Vaping Substances     Social History     Tobacco Use   Smoking Status Never Smoker   Smokeless Tobacco Never Used     Family History: non-contributory    Meds/Allergies   all current active meds have been reviewed  Allergies   Allergen Reactions    Erythromycin Other (See Comments)     Unknown per pt    Penicillins Other (See Comments)     Unknown per pt       Objective   First Vitals:   Blood Pressure: 119/72 (09/15/21 1909)  Pulse: 104 (09/15/21 1909)  Temperature: (!) 96 8 °F (36 °C) (09/15/21 1909)  Temp Source: Temporal (09/15/21 1909)  Respirations: 16 (09/15/21 1909)  Height: 6' (182 9 cm) (09/16/21 1557)  Weight - Scale: 74 4 kg (164 lb 0 4 oz) (09/16/21 1557)  SpO2: 96 % (09/15/21 1909)    Current Vitals:   Blood Pressure: 139/74 (09/17/21 0729)  Pulse: 76 (09/17/21 0729)  Temperature: 98 3 °F (36 8 °C) (09/16/21 2300)  Temp Source: Temporal (09/16/21 2300)  Respirations: 18 (09/17/21 0729)  Height: 6' (182 9 cm) (09/16/21 1557)  Weight - Scale: 74 4 kg (164 lb 0 4 oz) (09/16/21 1557)  SpO2: 97 % (09/17/21 0729)      Intake/Output Summary (Last 24 hours) at 9/17/2021 1207  Last data filed at 9/16/2021 1746  Gross per 24 hour   Intake 460 ml   Output 650 ml   Net -190 ml       Invasive Devices     Peripherally Inserted Central Catheter Line            PICC Line 64/07/08 Right Basilic <1 day          Peripheral Intravenous Line            Peripheral IV 09/15/21 Right Hand 2 days                Physical Exam  Vitals and nursing note reviewed     Constitutional:       Appearance: He is well-developed  HENT:      Head: Normocephalic and atraumatic  Nose: Nose normal       Mouth/Throat:      Mouth: Mucous membranes are moist       Pharynx: Oropharynx is clear  Eyes:      Conjunctiva/sclera: Conjunctivae normal    Cardiovascular:      Rate and Rhythm: Normal rate and regular rhythm  Heart sounds: No murmur heard  Pulmonary:      Effort: Pulmonary effort is normal  No respiratory distress  Breath sounds: Normal breath sounds  Abdominal:      Palpations: Abdomen is soft  Tenderness: There is no abdominal tenderness  Musculoskeletal:      Cervical back: Neck supple  Skin:     General: Skin is warm and dry  Comments: Stage III sacral decubitus ulcer 4 cm x 4 cm in maximum diameter by depth of 0 5 cm  Healthy bed of pink granulation tissue  Minimal degree of fibrinous exudate along the skin edges  The wound is amenable to a wound VAC application  Neurological:      General: No focal deficit present  Mental Status: He is alert and oriented to person, place, and time  Psychiatric:         Mood and Affect: Mood normal          Behavior: Behavior normal          Thought Content: Thought content normal          Lab Results: I have personally reviewed pertinent lab results  Imaging: I have personally reviewed pertinent reports  EKG, Pathology, and Other Studies: I have personally reviewed pertinent reports  Counseling / Coordination of Care  Total floor / unit time spent today 35 minutes  Greater than 50% of total time was spent with the patient and / or family counseling and / or coordination of care  A description of the counseling / coordination of care: 15

## 2021-09-17 NOTE — ASSESSMENT & PLAN NOTE
· Notified by case management, that the patient's caregiver is unable to take him back home by unspecified reasons  · They are requesting placement  · Prior to placement, the patient will need to be optioned via the state  · Patient is otherwise medically stable for discharge  · Will discharge when case management has a final disposition

## 2021-09-17 NOTE — PROGRESS NOTES
Vancomycin Assessment    Karmen Kawasaki is a 49 y o  male who is currently receiving vancomycin 1500 mg IV q8 for MRSA confirmed, skin-soft tissue infection     Relevant clinical data and objective history reviewed:  Creatinine   Date Value Ref Range Status   09/16/2021 0 63 (L) 0 70 - 1 30 mg/dL Final     Comment:     Standardized to IDMS reference method   09/15/2021 0 61 (L) 0 70 - 1 30 mg/dL Final     Comment:     Standardized to IDMS reference method   09/15/2021 0 48 (L) 0 70 - 1 30 mg/dL Final     Comment:     Standardized to IDMS reference method     /68 (BP Location: Left arm)   Pulse 82   Temp (!) 97 3 °F (36 3 °C) (Temporal)   Resp 17   Ht 6' (1 829 m)   Wt 74 4 kg (164 lb 0 4 oz)   SpO2 98%   BMI 22 25 kg/m²   I/O last 3 completed shifts: In: 460 [P O :360; IV Piggyback:100]  Out: 650 [Urine:650]  Lab Results   Component Value Date/Time    BUN 19 09/16/2021 01:43 PM    WBC 6 80 09/16/2021 01:43 PM    HGB 11 7 (L) 09/16/2021 01:43 PM    HCT 35 3 (L) 09/16/2021 01:43 PM    MCV 96 09/16/2021 01:43 PM     (H) 09/16/2021 01:43 PM     Temp Readings from Last 3 Encounters:   09/16/21 (!) 97 3 °F (36 3 °C) (Temporal)   09/15/21 (!) 96 °F (35 6 °C) (Temporal)   05/18/21 97 9 °F (36 6 °C) (Tympanic)     Vancomycin Days of Therapy: 2    Assessment/Plan  The patient is currently on vancomycin utilizing scheduled dosing  The patient is receiving 1500 mg IV q8 with the most recent vancomycin level being at steady-state and supratherapeutic based on a goal of 15-20 (appropriate for most indications) ; therefore, after clinical evaluation will be changed to 1750 mg IV q12   Pharmacy will continue to follow closely for s/sx of nephrotoxicity, infusion reactions, and appropriateness of therapy  BMP and CBC will be ordered per protocol  Plan for trough as patient approaches steady state, prior to the 4th  dose at approximately 1530 on 9/18/21   Pharmacy will continue to follow the patients culture results and clinical progress daily      Genevieve Yoo, Pharmacist no suicidal ideation/no anxiety

## 2021-09-17 NOTE — CASE MANAGEMENT
Case Management Assessment    Patient name Karmen Kawasaki  Location /-31 MRN [id number]  : 1965 Date 2021       Current Admission Date: 9/15/2021  Current Admission Diagnosis:  S/P PICC central line placement  Previous Admission - Discharge Date:9/15/21   LOS (days): 1  Geometric Mean LOS (GMLOS) (days): 2 80  Days to GMLOS:2 1 Previous Discharge Diagnosis:  There are no discharge diagnoses documented for the most recent discharge  OBJECTIVE:  PATIENT READMITTED TO HOSPITAL  Risk of Unplanned Readmission Score: 24   Bundle(if applicable):    Current admission status: Inpatient  Preferred Pharmacy:   [address] [place] [address]  Phone: [phone] Fax: [phone]    Primary Care Provider: Orville Godwin PA-C    Primary Insurance: MEDICARE  Secondary Insurance[place] [employer]    ASSESSMENT:  Active Health Care Agents    There are no active Health Care Agents on file           Obs Notice Signed:  (Discharge planning)    [place] of Residence: [place]    Readmission Root Cause  30 Day Readmission: Yes  Who directed you to return to the hospital?: [facility]  Did you understand whom to contact if you had questions or problems?: Yes  Did you get your prescriptions before you left the hospital?: Yes  Were you able to get your prescriptions filled when you left the hospital?: Yes  Did you take your medications as prescribed?: Yes  Were you able to get to your follow-up appointments?: No  Reason[de-identified] Readmitted prior to appointment  Patient was readmitted due to: Pulled out PICC line  Action Plan: New PICC line inserted, however foster mother now wants him placed into a SNF until IVABX are completed    Patient Information  Mental Status: Alert  During Assessment patient was accompanied by: Sister  Assessment information provided by[de-identified] Sister  Primary Caregiver:  Destiny Soteloum mother)  Caregiver's Name[de-identified] Rhys Clarke Reinaldo Lawrence Relationship to Patient[de-identified] Other (Specify) Ami oJ mother)  Support Systems: Other- Comment Ex  Judaism, community, neighbor, etc  (Foster sister [facility])  Type of Current Residence: Bi-level  Upon entering residence, is there a bedroom on the main floor (no further steps)?: Yes  Upon entering residence, is there a bathroom on the main floor (no further steps)?: Yes  Living Arrangements: Other (Comment) (Life share)  Is patient a ?: No    Activities of Daily Living Prior to Admission  Functional Status: Assistance  Completes ADLs independently?: No  Level of ADL dependence: Assistance  Ambulates independently?: Yes  Does patient use assisted devices?: No  Does patient currently own DME?: Yes  What DME does the patient currently own?: Wheelchair, Florie [employer]asket  Does patient have a history of Outpatient Therapy (PT/OT)?: No  Does the patient have a history of Short-Term Rehab?: No  Does patient currently have [place] ?: Yes    Current Home Health Care  Type of Current Home Care Services: Nurse visit  Current Home Health Agency[de-identified] [address] [place] [address] Provider[de-identified] PCP    Patient Information Continued  Income Source: SSI/SSD  Does patient have prescription coverage?: Yes  Does patient receive dialysis treatments?: No  Does patient have a history of Mental Health Diagnosis?: Yes (ID)  Is patient receiving treatment for mental health?: No  Treatment options were provided    Has patient received inpatient treatment related to mental health in the last 2 years?: No      Means of Transportation  Means of Transport to Appts[de-identified] Family transport  In the past 12 months, has lack of transportation kept you from medical appointments or from getting medications?: No  In the past 12 months, has lack of transportation kept you from meetings, work, or from getting things needed for daily living?: No  Was application for public transport provided?: No     Pt was recently DC from this hospital with a PICC line and wound vac on 9/15/21 with [employer] to teach the IVABX therapy  Pt pulled out the PICC line that same pm and was sent back to the ER for PICC placement  Spoke to the pt foster mother Ree Grater who states she would like the pt placed until the IVABX are completed  PT last day for IVABX is 9/22/21  Spoke to foster mother last pm and instructed to bring the wound vac and IVABX back to the hospital   Attempted to call the foster mother several times, unable to leave a VM

## 2021-09-18 LAB
GLUCOSE SERPL-MCNC: 100 MG/DL (ref 65–140)
GLUCOSE SERPL-MCNC: 105 MG/DL (ref 65–140)
GLUCOSE SERPL-MCNC: 109 MG/DL (ref 65–140)
GLUCOSE SERPL-MCNC: 112 MG/DL (ref 65–140)

## 2021-09-18 PROCEDURE — 99232 SBSQ HOSP IP/OBS MODERATE 35: CPT | Performed by: HOSPITALIST

## 2021-09-18 PROCEDURE — 99232 SBSQ HOSP IP/OBS MODERATE 35: CPT | Performed by: PHYSICIAN ASSISTANT

## 2021-09-18 PROCEDURE — 82948 REAGENT STRIP/BLOOD GLUCOSE: CPT

## 2021-09-18 RX ADMIN — LEVOTHYROXINE SODIUM 125 MCG: 25 TABLET ORAL at 05:47

## 2021-09-18 RX ADMIN — VANCOMYCIN HYDROCHLORIDE 1750 MG: 1 INJECTION, POWDER, LYOPHILIZED, FOR SOLUTION INTRAVENOUS at 17:48

## 2021-09-18 RX ADMIN — QUETIAPINE FUMARATE 150 MG: 150 TABLET, EXTENDED RELEASE ORAL at 17:42

## 2021-09-18 RX ADMIN — DIVALPROEX SODIUM 500 MG: 500 TABLET, DELAYED RELEASE ORAL at 10:48

## 2021-09-18 RX ADMIN — LORATADINE 10 MG: 10 TABLET ORAL at 10:50

## 2021-09-18 RX ADMIN — HYDROXYZINE HYDROCHLORIDE 50 MG: 25 TABLET ORAL at 17:42

## 2021-09-18 RX ADMIN — HYDROXYZINE HYDROCHLORIDE 50 MG: 25 TABLET ORAL at 22:04

## 2021-09-18 RX ADMIN — GABAPENTIN 100 MG: 100 CAPSULE ORAL at 17:46

## 2021-09-18 RX ADMIN — POLYETHYLENE GLYCOL 3350 17 G: 17 POWDER, FOR SOLUTION ORAL at 10:51

## 2021-09-18 RX ADMIN — LEVETIRACETAM 1500 MG: 500 TABLET ORAL at 10:47

## 2021-09-18 RX ADMIN — LACOSAMIDE 200 MG: 150 TABLET, FILM COATED ORAL at 22:04

## 2021-09-18 RX ADMIN — CALCIUM 1 TABLET: 500 TABLET ORAL at 12:37

## 2021-09-18 RX ADMIN — QUETIAPINE FUMARATE 50 MG: 50 TABLET, EXTENDED RELEASE ORAL at 17:47

## 2021-09-18 RX ADMIN — ACETAMINOPHEN 650 MG: 325 TABLET ORAL at 22:10

## 2021-09-18 RX ADMIN — DOCUSATE SODIUM 100 MG: 100 CAPSULE, LIQUID FILLED ORAL at 10:49

## 2021-09-18 RX ADMIN — QUETIAPINE FUMARATE 150 MG: 150 TABLET, EXTENDED RELEASE ORAL at 10:50

## 2021-09-18 RX ADMIN — GABAPENTIN 100 MG: 100 CAPSULE ORAL at 22:04

## 2021-09-18 RX ADMIN — TEMAZEPAM 15 MG: 15 CAPSULE ORAL at 22:04

## 2021-09-18 RX ADMIN — CALCIUM 1 TABLET: 500 TABLET ORAL at 17:43

## 2021-09-18 RX ADMIN — HYDROXYZINE HYDROCHLORIDE 50 MG: 25 TABLET ORAL at 10:48

## 2021-09-18 RX ADMIN — LACOSAMIDE 200 MG: 150 TABLET, FILM COATED ORAL at 10:49

## 2021-09-18 RX ADMIN — LISINOPRIL 10 MG: 10 TABLET ORAL at 10:49

## 2021-09-18 RX ADMIN — GABAPENTIN 100 MG: 100 CAPSULE ORAL at 10:47

## 2021-09-18 RX ADMIN — VANCOMYCIN HYDROCHLORIDE 1750 MG: 1 INJECTION, POWDER, LYOPHILIZED, FOR SOLUTION INTRAVENOUS at 03:21

## 2021-09-18 RX ADMIN — BENZTROPINE MESYLATE 0.5 MG: 0.5 TABLET ORAL at 22:04

## 2021-09-18 RX ADMIN — PRAVASTATIN SODIUM 80 MG: 40 TABLET ORAL at 17:43

## 2021-09-18 RX ADMIN — DIVALPROEX SODIUM 1000 MG: 500 TABLET, EXTENDED RELEASE ORAL at 22:04

## 2021-09-18 RX ADMIN — LEVETIRACETAM 1500 MG: 500 TABLET ORAL at 17:46

## 2021-09-18 RX ADMIN — BENZTROPINE MESYLATE 0.5 MG: 0.5 TABLET ORAL at 10:50

## 2021-09-18 RX ADMIN — ENOXAPARIN SODIUM 40 MG: 100 INJECTION SUBCUTANEOUS at 10:50

## 2021-09-18 RX ADMIN — QUETIAPINE FUMARATE 50 MG: 50 TABLET, EXTENDED RELEASE ORAL at 10:47

## 2021-09-18 RX ADMIN — DOCUSATE SODIUM 100 MG: 100 CAPSULE, LIQUID FILLED ORAL at 17:42

## 2021-09-18 RX ADMIN — Medication 1000 UNITS: at 10:49

## 2021-09-18 RX ADMIN — QUETIAPINE FUMARATE 400 MG: 50 TABLET, EXTENDED RELEASE ORAL at 22:09

## 2021-09-18 NOTE — ASSESSMENT & PLAN NOTE
48 yo M with gluteal abscess s/p debridement and VAC  VAC applied 9/17 in tact  Plan:  Continue VAC changes M/W/F  Stable from surgical perspective

## 2021-09-18 NOTE — PROGRESS NOTES
[facility]  Progress Note Jessica Varghese 1965, 49 y o  male MRN: [id number]  Unit/Bed#: -02 Encounter: [id number]  Primary Care Provider: Adebayo Jaramillo PA-C   Date and time admitted to hospital: 9/15/2021  7:08 PM    Gluteal abscess  Assessment & Plan  50 yo M with gluteal abscess s/p debridement and VAC  VAC applied 9/17 in tact  Plan:  Continue VAC changes M/W/F  Stable from surgical perspective  Subjective/Objective   Chief Complaint: none    Subjective: no complaints    Objective: no overnight events    Blood pressure 130/62, pulse 80, temperature 98 2 °F (36 8 °C), temperature source Temporal, resp  rate 16, height 6' (1 829 m), weight 74 4 kg (164 lb), SpO2 96 %  ,Body mass index is 22 24 kg/m²  Intake/Output Summary (Last 24 hours) at 9/18/2021 0928  Last data filed at 9/17/2021 1700  Gross per 24 hour   Intake 360 ml   Output --   Net 360 ml       Invasive Devices     Peripherally Inserted Central Catheter Line            PICC Line 49/91/75 Right Basilic 1 day          Peripheral Intravenous Line            Peripheral IV 09/15/21 Right Hand 3 days                Physical Exam  Vitals and nursing note reviewed  Constitutional:       General: He is not in acute distress  Appearance: He is well-developed  He is not diaphoretic  Comments: Resting comfortable on right side  HENT:      Head: Normocephalic and atraumatic  Pulmonary:      Effort: No respiratory distress  Musculoskeletal:         General: Normal range of motion  Cervical back: Normal range of motion  Skin:     General: Skin is warm and dry  Capillary Refill: Capillary refill takes less than 2 seconds  Psychiatric:         Behavior: Behavior normal            Lab, Imaging and other studies:I have personally reviewed pertinent lab results      VTE Pharmacologic Prophylaxis: Enoxaparin (Lovenox)  VTE Mechanical Prophylaxis: sequential compression device

## 2021-09-18 NOTE — PROGRESS NOTES
Vancomycin IV Pharmacy-to-Dose Consultation    Zeyad Watkins is a 49 y o  male who is currently receiving Vancomycin IV with management by the Pharmacy Consult service  Assessment/Plan:  The patient was reviewed  Renal function is stable and no signs or symptoms of nephrotoxicity and/or infusion reactions were documented in the chart  Based on todays assessment, continue current vancomycin (day # 4) dosing of 1750mg IV q12H, with a plan for trough to be drawn at 1530 on 9/19  Vanco tr was not drawn at originally scheduled time of 1530 on 9/18  Pushed back tr draw to avoid overnight level  Will adjust dosing according to level on 9/19  We will continue to follow the patients culture results and clinical progress daily      Tara Kam, Pharmacist

## 2021-09-18 NOTE — PROGRESS NOTES
[facility]  Progress Note Jessica Varghese 1965, 49 y o  male MRN: [id number]  Unit/Bed#: -02 Encounter: [id number]  Primary Care Provider: Adebayo Jaramillo PA-C   Date and time admitted to hospital: 9/15/2021  7:08 PM    * S/P PICC central line placement  Assessment & Plan  · Patient was discharged and readmitted on the same day after he pulled his PICC line out  · PICC line was successfully replaced on 09/16/2021 by IR  · Patient remains medically stable for discharge  · See the remaining outlines below    Social problem  Assessment & Plan  · Notified by case management, that the patient's caregiver is unable to take him back home by unspecified reasons  · They are requesting placement  · Prior to placement, the patient will need to be optioned via the state  · Patient is otherwise medically stable for discharge  · Will discharge when case management has a final disposition    Gluteal abscess  Assessment & Plan  · Continue IV vancomycin through 09/22/2021  · Appreciate general surgical input  · Continue wound VAC    Sepsis ([facility] [address] )  Assessment & Plan  · Secondary to left gluteal abscess  · Status post an Infectious Disease evaluation on the previous admission  · Status post a completed course of IV cephalosporin  · Continue vancomycin 1500 mg IV t i d   To complete the course  · Final dose is scheduled for the evening of 09/22/2021, after which the PICC line can be officially removed    Bacteremia due to Gram-positive bacteria  Assessment & Plan  · Continue vancomycin as outlined above    Essential hypertension  Assessment & Plan  · BP currently stable  · Continue lisinopril    Generalized convulsive epilepsy ([facility] [address] )  Assessment & Plan  · Continue home meds which include Keppra, Restoril, Vimpat and Depakote    Acquired hypothyroidism  Assessment & Plan  · Continue levothyroxine    Hyperlipidemia  Assessment & Plan  · Continue pravastatin    Type 2 diabetes mellitus without complication, without long-term current use of insulin (HCC)  Assessment & Plan  · Oral hypoglycemic medications remain on hold  · Continue Accu-Cheks AC and HS with sliding scale coverage    Hypomagnesemia  Assessment & Plan  · Resolved with repletion    Cerebral palsy (HCC)  Assessment & Plan  · Supportive care        VTE Prophylaxis:  Enoxaparin (Lovenox)    Patient Centered Rounds: I have performed bedside rounds with nursing staff today  Discussions with Specialists or Other Care Team Provider:  General surgery  Education and Discussions with Family / Patient:  patient's sister was brought up to par last evening    Current Length of Stay: 2 day(s)    Current Patient Status: Inpatient   Certification Statement: The patient will continue to require additional inpatient hospital stay due to The need for placement    Discharge Plan:  Patient is medically stable for discharge    Code Status: Level 1 - Full Code    Subjective:   Patient seen and examined, resting in bed watching TV, and is at baseline from neurological standpoint in the setting having cerebral palsy  Objective:     Vitals:   Temp (24hrs), Av 1 °F (36 7 °C), Min:97 6 °F (36 4 °C), Max:98 4 °F (36 9 °C)    Temp:  [97 6 °F (36 4 °C)-98 4 °F (36 9 °C)] 97 6 °F (36 4 °C)  HR:  [80-92] 82  Resp:  [16-20] 20  BP: (118-138)/(61-66) 118/61  SpO2:  [95 %-98 %] 95 %  Body mass index is 22 24 kg/m²  Input and Output Summary (last 24 hours): Intake/Output Summary (Last 24 hours) at 2021 1308  Last data filed at 2021 1700  Gross per 24 hour   Intake 360 ml   Output --   Net 360 ml       Physical Exam:   Physical Exam  Vitals and nursing note reviewed  Constitutional:       General: He is not in acute distress  Appearance: Normal appearance  He is not ill-appearing  HENT:      Head: Normocephalic and atraumatic  Nose: Nose normal    Eyes:      Extraocular Movements: Extraocular movements intact        Pupils: Pupils are equal, round, and reactive to light  Cardiovascular:      Rate and Rhythm: Normal rate and regular rhythm  Pulses: Normal pulses  Heart sounds: Normal heart sounds  No murmur heard  No friction rub  No gallop  Pulmonary:      Effort: Pulmonary effort is normal       Breath sounds: Normal breath sounds  Abdominal:      General: There is no distension  Palpations: Abdomen is soft  There is no mass  Tenderness: There is no abdominal tenderness  There is no guarding or rebound  Musculoskeletal:         General: No swelling or tenderness  Normal range of motion  Cervical back: Normal range of motion and neck supple  No rigidity  No muscular tenderness  Right lower leg: No edema  Left lower leg: No edema  Skin:     General: Skin is warm  Capillary Refill: Capillary refill takes less than 2 seconds  Findings: No erythema or rash  Neurological:      General: No focal deficit present  Mental Status: He is alert  Mental status is at baseline     Psychiatric:         Mood and Affect: Mood normal          Behavior: Behavior normal          Additional Data:     Labs:    Results from last 7 days   Lab Units 09/17/21  0458   WBC Thousand/uL 8 90   HEMOGLOBIN g/dL 11 2*   HEMATOCRIT % 32 8*   PLATELETS Thousands/uL 362   NEUTROS PCT % 56   LYMPHS PCT % 27   MONOS PCT % 14*   EOS PCT % 1     Results from last 7 days   Lab Units 09/17/21  0458 09/14/21  0512   SODIUM mmol/L 134 137   POTASSIUM mmol/L 4 7 4 4   CHLORIDE mmol/L 99 103   CO2 mmol/L 29 29   BUN mg/dL 19 19   CREATININE mg/dL 0 56* 0 51*   CALCIUM mg/dL 9 4 9 2   ALK PHOS U/L  --  69   ALT U/L  --  66*   AST U/L  --  27         Results from last 7 days   Lab Units 09/18/21  1114 09/18/21  0634 09/17/21  2029 09/17/21  1630 09/17/21  1119 09/17/21  0615 09/16/21  2054 09/16/21  1618 09/16/21  1234 09/16/21  0605 09/16/21  0227 09/15/21  1030   POC GLUCOSE mg/dl 109 112 176* 116 232* 102 132 77 158* 87 105 98 * I Have Reviewed All Lab Data Listed Above  * Additional Pertinent Lab Tests Reviewed:  [facility] Admission  Reviewed    Imaging:  Imaging Reports Reviewed Today Include:  None    Recent Cultures (last 7 days):           Last 24 Hours Medication List:   Current Facility-Administered Medications   Medication Dose Route Frequency Provider Last Rate    acetaminophen  650 mg Oral Q6H PRN Isi Valdivia MD      benzonatate  200 mg Oral TID PRN Isi Valdivia MD      benztropine  0 5 mg Oral BID Isi Valdivia MD      calcium carbonate  1 tablet Oral TID With Indira Jimenze MD      cholecalciferol  1,000 Units Oral Daily Isi Valdivia MD      clonazePAM  0 25 mg Oral Daily PRN Isi Valdivia MD      divalproex sodium  1,000 mg Oral HS Isi Valdivia MD      divalproex sodium  500 mg Oral Daily Isi Valdivia MD      docusate sodium  100 mg Oral BID Isi Valdivia MD      enoxaparin  40 mg Subcutaneous Daily Isi Valdivia MD      gabapentin  100 mg Oral TID Isi Valdivia MD      hydrOXYzine HCL  50 mg Oral TID Isi Valdivia MD      insulin lispro  1-5 Units Subcutaneous TID Chandrika Thomas MD      insulin lispro  1-5 Units Subcutaneous HS Isi Valdivia MD      lacosamide  200 mg Oral Q12H Chandrika Thomas MD      levETIRAcetam  1,500 mg Oral BID Isi Valdivia MD      levothyroxine  125 mcg Oral Early Morning Isi Valdivia MD      lisinopril  10 mg Oral Daily Isi Valdivia MD      loratadine  10 mg Oral Daily Isi Valdivia MD      ondansetron  4 mg Intravenous Q6H PRN Isi Valdivia MD      polyethylene glycol  17 g Oral Daily Isi Valdivia MD      pravastatin  80 mg Oral Daily With Amy Oglesby MD      QUEtiapine  50 mg Oral BID (AM & Afternoon) Sha Phelps MD      And    QUEtiapine  150 mg Oral BID (AM & Afternoon) Sha Phelps MD      QUEtiapine (SEROquel XR) 400 mg  400 mg Oral HS Sha Phelps MD      temazepam  15 mg Oral HS Sha Phelps MD      vancomycin  1,750 mg Intravenous Q12H Sha Phelps MD 1,750 mg (09/18/21 0321)        Today, Patient Was Seen By: Elie Murrell MD    ** Please Note: Dictation voice to text software may have been used in the creation of this document   **

## 2021-09-18 NOTE — ASSESSMENT & PLAN NOTE
· Continue IV vancomycin through 09/22/2021  · Appreciate general surgical input  · Continue wound VAC

## 2021-09-19 LAB
GLUCOSE SERPL-MCNC: 151 MG/DL (ref 65–140)
GLUCOSE SERPL-MCNC: 161 MG/DL (ref 65–140)
GLUCOSE SERPL-MCNC: 164 MG/DL (ref 65–140)
GLUCOSE SERPL-MCNC: 91 MG/DL (ref 65–140)
VANCOMYCIN TROUGH SERPL-MCNC: 16.2 UG/ML (ref 5–12)

## 2021-09-19 PROCEDURE — 82948 REAGENT STRIP/BLOOD GLUCOSE: CPT

## 2021-09-19 PROCEDURE — 80202 ASSAY OF VANCOMYCIN: CPT | Performed by: INTERNAL MEDICINE

## 2021-09-19 PROCEDURE — 99232 SBSQ HOSP IP/OBS MODERATE 35: CPT | Performed by: HOSPITALIST

## 2021-09-19 RX ADMIN — HYDROXYZINE HYDROCHLORIDE 50 MG: 25 TABLET ORAL at 17:44

## 2021-09-19 RX ADMIN — LACOSAMIDE 200 MG: 150 TABLET, FILM COATED ORAL at 09:46

## 2021-09-19 RX ADMIN — QUETIAPINE FUMARATE 150 MG: 150 TABLET, EXTENDED RELEASE ORAL at 09:47

## 2021-09-19 RX ADMIN — ENOXAPARIN SODIUM 40 MG: 100 INJECTION SUBCUTANEOUS at 09:48

## 2021-09-19 RX ADMIN — Medication 1000 UNITS: at 09:47

## 2021-09-19 RX ADMIN — LEVETIRACETAM 1500 MG: 500 TABLET ORAL at 09:45

## 2021-09-19 RX ADMIN — QUETIAPINE FUMARATE 400 MG: 50 TABLET, EXTENDED RELEASE ORAL at 22:25

## 2021-09-19 RX ADMIN — LEVOTHYROXINE SODIUM 125 MCG: 25 TABLET ORAL at 06:23

## 2021-09-19 RX ADMIN — POLYETHYLENE GLYCOL 3350 17 G: 17 POWDER, FOR SOLUTION ORAL at 09:48

## 2021-09-19 RX ADMIN — HYDROXYZINE HYDROCHLORIDE 50 MG: 25 TABLET ORAL at 09:47

## 2021-09-19 RX ADMIN — CALCIUM 1 TABLET: 500 TABLET ORAL at 17:45

## 2021-09-19 RX ADMIN — INSULIN LISPRO 1 UNITS: 100 INJECTION, SOLUTION INTRAVENOUS; SUBCUTANEOUS at 12:15

## 2021-09-19 RX ADMIN — DIVALPROEX SODIUM 500 MG: 500 TABLET, DELAYED RELEASE ORAL at 09:46

## 2021-09-19 RX ADMIN — LACOSAMIDE 200 MG: 150 TABLET, FILM COATED ORAL at 22:20

## 2021-09-19 RX ADMIN — LISINOPRIL 10 MG: 10 TABLET ORAL at 10:14

## 2021-09-19 RX ADMIN — HYDROXYZINE HYDROCHLORIDE 50 MG: 25 TABLET ORAL at 22:20

## 2021-09-19 RX ADMIN — CALCIUM 1 TABLET: 500 TABLET ORAL at 09:45

## 2021-09-19 RX ADMIN — DOCUSATE SODIUM 100 MG: 100 CAPSULE, LIQUID FILLED ORAL at 17:45

## 2021-09-19 RX ADMIN — VANCOMYCIN HYDROCHLORIDE 1750 MG: 1 INJECTION, POWDER, LYOPHILIZED, FOR SOLUTION INTRAVENOUS at 03:45

## 2021-09-19 RX ADMIN — GABAPENTIN 100 MG: 100 CAPSULE ORAL at 22:20

## 2021-09-19 RX ADMIN — LEVETIRACETAM 1500 MG: 500 TABLET ORAL at 17:45

## 2021-09-19 RX ADMIN — DIVALPROEX SODIUM 1000 MG: 500 TABLET, EXTENDED RELEASE ORAL at 22:20

## 2021-09-19 RX ADMIN — BENZTROPINE MESYLATE 0.5 MG: 0.5 TABLET ORAL at 09:47

## 2021-09-19 RX ADMIN — CALCIUM 1 TABLET: 500 TABLET ORAL at 12:15

## 2021-09-19 RX ADMIN — INSULIN LISPRO 1 UNITS: 100 INJECTION, SOLUTION INTRAVENOUS; SUBCUTANEOUS at 22:24

## 2021-09-19 RX ADMIN — QUETIAPINE FUMARATE 50 MG: 50 TABLET, EXTENDED RELEASE ORAL at 17:44

## 2021-09-19 RX ADMIN — TEMAZEPAM 15 MG: 15 CAPSULE ORAL at 22:20

## 2021-09-19 RX ADMIN — LORATADINE 10 MG: 10 TABLET ORAL at 09:47

## 2021-09-19 RX ADMIN — QUETIAPINE FUMARATE 150 MG: 150 TABLET, EXTENDED RELEASE ORAL at 17:44

## 2021-09-19 RX ADMIN — PRAVASTATIN SODIUM 80 MG: 40 TABLET ORAL at 17:44

## 2021-09-19 RX ADMIN — VANCOMYCIN HYDROCHLORIDE 1750 MG: 1 INJECTION, POWDER, LYOPHILIZED, FOR SOLUTION INTRAVENOUS at 17:47

## 2021-09-19 RX ADMIN — INSULIN LISPRO 1 UNITS: 100 INJECTION, SOLUTION INTRAVENOUS; SUBCUTANEOUS at 17:46

## 2021-09-19 RX ADMIN — GABAPENTIN 100 MG: 100 CAPSULE ORAL at 17:44

## 2021-09-19 RX ADMIN — BENZTROPINE MESYLATE 0.5 MG: 0.5 TABLET ORAL at 22:20

## 2021-09-19 RX ADMIN — GABAPENTIN 100 MG: 100 CAPSULE ORAL at 09:45

## 2021-09-19 RX ADMIN — QUETIAPINE FUMARATE 50 MG: 50 TABLET, EXTENDED RELEASE ORAL at 09:46

## 2021-09-19 RX ADMIN — DOCUSATE SODIUM 100 MG: 100 CAPSULE, LIQUID FILLED ORAL at 09:46

## 2021-09-19 NOTE — CONSULTS
Vancomycin Assessment    Yanet Johnson is a 49 y o  male who is currently receiving vancomycin 1750 mg iv q 12 hrs for MRSA confirmed, skin-soft tissue infection     Relevant clinical data and objective history reviewed:  Creatinine   Date Value Ref Range Status   09/17/2021 0 56 (L) 0 70 - 1 30 mg/dL Final     Comment:     Standardized to IDMS reference method   09/16/2021 0 63 (L) 0 70 - 1 30 mg/dL Final     Comment:     Standardized to IDMS reference method   09/15/2021 0 61 (L) 0 70 - 1 30 mg/dL Final     Comment:     Standardized to IDMS reference method     /76 (BP Location: Left arm)   Pulse 91   Temp (!) 96 4 °F (35 8 °C) (Temporal)   Resp 18   Ht 6' (1 829 m)   Wt 74 4 kg (164 lb)   SpO2 92%   BMI 22 24 kg/m²   I/O last 3 completed shifts:  In: -   Out: 650 [Urine:650]  Lab Results   Component Value Date/Time    BUN 19 09/17/2021 04:58 AM    WBC 8 90 09/17/2021 04:58 AM    HGB 11 2 (L) 09/17/2021 04:58 AM    HCT 32 8 (L) 09/17/2021 04:58 AM    MCV 96 09/17/2021 04:58 AM     09/17/2021 04:58 AM     Temp Readings from Last 3 Encounters:   09/18/21 (!) 96 4 °F (35 8 °C) (Temporal)   09/15/21 (!) 96 °F (35 6 °C) (Temporal)   05/18/21 97 9 °F (36 6 °C) (Tympanic)     Vancomycin Days of Therapy: 5    Assessment/Plan  The patient is currently on vancomycin utilizing scheduled dosing  The patient is receiving 1750 mg iv q 12 hrs with the most recent vancomycin level being at steady-state and therapeutic based on a goal of 15-20 (appropriate for most indications) ; therefore, is clinically appropriate and dose will be continued   Pharmacy will continue to follow closely for s/sx of nephrotoxicity, infusion reactions, and appropriateness of therapy  BMP and CBC will be ordered per protocol  Therapy is scheduled to be stopped on 9/22/21  If continued past this will schedule another trough  Pharmacy will continue to follow the patients culture results and clinical progress daily      Bárbara ISLAS Luci, Pharmacist

## 2021-09-19 NOTE — PROGRESS NOTES
[facility]  Progress Note Gabriella Khan 1965, 49 y o  male MRN: [id number]  Unit/Bed#: -02 Encounter: [id number]  Primary Care Provider: Brook Smith PA-C   Date and time admitted to hospital: 9/15/2021  7:08 PM    * S/P PICC central line placement  Assessment & Plan  · Patient was discharged on 09/15/2021 and is subsequently readmitted on the same day after he pulled his PICC line out  · PICC line was successfully replaced on 09/16/2021 by IR  · Patient remains medically stable for discharge  · See the remaining outlines below    Social problem  Assessment & Plan  · Notified by case management, that the patient's caregiver is unable to take him back home by unspecified reasons  · They are requesting placement  · Prior to placement, the patient will need to be optioned via the state  · Patient is otherwise medically stable for discharge  · Will discharge when case management has a final disposition    Gluteal abscess  Assessment & Plan  · Continue IV vancomycin through 09/22/2021  · Appreciate general surgical input  · Continue wound VAC    Sepsis ([facility] [address] )  Assessment & Plan  · Secondary to left gluteal abscess  · Status post an Infectious Disease evaluation on the previous admission  · Status post a completed course of IV cephalosporin on 9/12/2021  · Continue vancomycin 1500 mg IV t i d  through 09/22/2021, after which the PICC line can be officially removed as per previous ID recommendations    Bacteremia due to Gram-positive bacteria  Assessment & Plan  · Continue vancomycin as outlined above    Essential hypertension  Assessment & Plan  · BP currently stable  · Continue lisinopril    Generalized convulsive epilepsy ([facility] [address] )  Assessment & Plan  · Continue home meds which include Keppra, Restoril, Vimpat and Depakote    Acquired hypothyroidism  Assessment & Plan  · Continue levothyroxine    Hyperlipidemia  Assessment & Plan  · Continue pravastatin    Type 2 diabetes mellitus without complication, without long-term current use of insulin (HCC)  Assessment & Plan  · Oral hypoglycemic medications remain on hold  · Continue Accu-Cheks AC and HS with sliding scale coverage    Hypomagnesemia  Assessment & Plan  · Resolved with repletion    Cerebral palsy (HCC)  Assessment & Plan  · Supportive care        VTE Prophylaxis:  Enoxaparin (Lovenox)    Patient Centered Rounds: I have performed bedside rounds with nursing staff today  Discussions with Specialists or Other Care Team Provider:  General surgery,  nursing  Education and Discussions with Family / Patient:  Caregivers and family members have been brought up to par    Current Length of Stay: 3 day(s)    Current Patient Status: Inpatient   Certification Statement: The patient will continue to require additional inpatient hospital stay due to The need for placement    Discharge Plan:  Patient has remained medically stable for discharge from 2021, case management is working on placement    Code Status: Level 1 - Full Code    Subjective:   Patient seen and examined, resting in bed, is watching TV, remains at baseline from having history of cerebral palsy  Objective:     Vitals:   Temp (24hrs), Av 5 °F (36 4 °C), Min:96 4 °F (35 8 °C), Max:98 6 °F (37 °C)    Temp:  [96 4 °F (35 8 °C)-98 6 °F (37 °C)] 96 4 °F (35 8 °C)  HR:  [84-86] 84  Resp:  [18] 18  BP: (125-134)/(63-69) 134/63  SpO2:  [96 %] 96 %  Body mass index is 22 24 kg/m²  Input and Output Summary (last 24 hours): Intake/Output Summary (Last 24 hours) at 2021 1200  Last data filed at 2021 2201  Gross per 24 hour   Intake --   Output 650 ml   Net -650 ml       Physical Exam:   Physical Exam  Vitals and nursing note reviewed  Constitutional:       General: He is not in acute distress  Appearance: Normal appearance  He is not ill-appearing  HENT:      Head: Normocephalic and atraumatic        Nose: Nose normal    Eyes:      Extraocular Movements: Extraocular movements intact  Pupils: Pupils are equal, round, and reactive to light  Cardiovascular:      Rate and Rhythm: Normal rate and regular rhythm  Pulses: Normal pulses  Heart sounds: Normal heart sounds  No murmur heard  No friction rub  No gallop  Pulmonary:      Effort: Pulmonary effort is normal       Breath sounds: Normal breath sounds  Abdominal:      General: There is no distension  Palpations: Abdomen is soft  There is no mass  Tenderness: There is no abdominal tenderness  There is no guarding or rebound  Musculoskeletal:         General: No swelling or tenderness  Normal range of motion  Cervical back: Normal range of motion and neck supple  No rigidity  No muscular tenderness  Right lower leg: No edema  Left lower leg: No edema  Skin:     General: Skin is warm  Capillary Refill: Capillary refill takes less than 2 seconds  Findings: No erythema or rash  Neurological:      General: No focal deficit present  Mental Status: He is alert  Mental status is at baseline  Comments:  At baseline in the setting of having cerebral palsy   Psychiatric:         Mood and Affect: Mood normal          Behavior: Behavior normal          Additional Data:     Labs:    Results from last 7 days   Lab Units 09/17/21  0458   WBC Thousand/uL 8 90   HEMOGLOBIN g/dL 11 2*   HEMATOCRIT % 32 8*   PLATELETS Thousands/uL 362   NEUTROS PCT % 56   LYMPHS PCT % 27   MONOS PCT % 14*   EOS PCT % 1     Results from last 7 days   Lab Units 09/17/21  0458 09/14/21  0512   SODIUM mmol/L 134 137   POTASSIUM mmol/L 4 7 4 4   CHLORIDE mmol/L 99 103   CO2 mmol/L 29 29   BUN mg/dL 19 19   CREATININE mg/dL 0 56* 0 51*   CALCIUM mg/dL 9 4 9 2   ALK PHOS U/L  --  69   ALT U/L  --  66*   AST U/L  --  27         Results from last 7 days   Lab Units 09/19/21  1146 09/19/21  0627 09/18/21  2203 09/18/21  1629 09/18/21  1114 09/18/21  0634 09/17/21 2029 09/17/21  1630 09/17/21  1119 09/17/21  0615 09/16/21  2054 09/16/21  1618   POC GLUCOSE mg/dl 151* 91 100 105 109 112 176* 116 232* 102 132 77           * I Have Reviewed All Lab Data Listed Above  * Additional Pertinent Lab Tests Reviewed:  Daisy Marcial Admission  Reviewed    Imaging:  Imaging Reports Reviewed Today Include:  None    Recent Cultures (last 7 days):           Last 24 Hours Medication List:   Current Facility-Administered Medications   Medication Dose Route Frequency Provider Last Rate    acetaminophen  650 mg Oral Q6H PRN Tamera Hill MD      benzonatate  200 mg Oral TID PRN Tamera Hill MD      benztropine  0 5 mg Oral BID Tamera Hill MD      calcium carbonate  1 tablet Oral TID With Kacey Carpenter MD      cholecalciferol  1,000 Units Oral Daily Tamera Hill MD      clonazePAM  0 25 mg Oral Daily PRN Tamera Hill MD      divalproex sodium  1,000 mg Oral HS Tamera Hill MD      divalproex sodium  500 mg Oral Daily Tamera Hill MD      docusate sodium  100 mg Oral BID Tamera Hill MD      enoxaparin  40 mg Subcutaneous Daily Tamera Hill MD      gabapentin  100 mg Oral TID Tamera Hill MD      hydrOXYzine HCL  50 mg Oral TID Tamera Hill MD      insulin lispro  1-5 Units Subcutaneous TID Debbie Ceja MD      insulin lispro  1-5 Units Subcutaneous HS Tamera Hill MD      lacosamide  200 mg Oral Q12H Debbie Ceja MD      levETIRAcetam  1,500 mg Oral BID Tamera Hill MD      levothyroxine  125 mcg Oral Early Morning Tamera Hill MD      lisinopril  10 mg Oral Daily Tamera Hill MD      loratadine  10 mg Oral Daily Tamera Hill MD      ondansetron  4 mg Intravenous Q6H PRN Tamera Hill MD      polyethylene glycol  17 g Oral Daily Sumair Kady Pulliam MD      pravastatin  80 mg Oral Daily With Amy Oglesby MD      QUEtiapine  50 mg Oral BID (AM & Afternoon) Luciano Lux MD      And    QUEtiapine  150 mg Oral BID (AM & Afternoon) Luciano Lux MD      QUEtiapine (SEROquel XR) 400 mg  400 mg Oral HS Luciano Lux MD      temazepam  15 mg Oral HS Luciano Lux MD      vancomycin  1,750 mg Intravenous Q12H Luciano Lux MD 1,750 mg (09/19/21 0345)        Today, Patient Was Seen By: Miesha Guerrero MD    ** Please Note: Dictation voice to text software may have been used in the creation of this document   **

## 2021-09-19 NOTE — ASSESSMENT & PLAN NOTE
· Patient was discharged on 09/15/2021 and is subsequently readmitted on the same day after he pulled his PICC line out  · PICC line was successfully replaced on 09/16/2021 by IR  · Patient remains medically stable for discharge  · See the remaining outlines below

## 2021-09-19 NOTE — ASSESSMENT & PLAN NOTE
· Secondary to left gluteal abscess  · Status post an Infectious Disease evaluation on the previous admission  · Status post a completed course of IV cephalosporin on 9/12/2021  · Continue vancomycin 1500 mg IV t i d  through 09/22/2021, after which the PICC line can be officially removed as per previous ID recommendations

## 2021-09-20 LAB
GLUCOSE SERPL-MCNC: 131 MG/DL (ref 65–140)
GLUCOSE SERPL-MCNC: 141 MG/DL (ref 65–140)
GLUCOSE SERPL-MCNC: 146 MG/DL (ref 65–140)
GLUCOSE SERPL-MCNC: 175 MG/DL (ref 65–140)

## 2021-09-20 PROCEDURE — 99232 SBSQ HOSP IP/OBS MODERATE 35: CPT | Performed by: PHYSICIAN ASSISTANT

## 2021-09-20 PROCEDURE — 82948 REAGENT STRIP/BLOOD GLUCOSE: CPT

## 2021-09-20 RX ADMIN — ENOXAPARIN SODIUM 40 MG: 100 INJECTION SUBCUTANEOUS at 08:18

## 2021-09-20 RX ADMIN — DOCUSATE SODIUM 100 MG: 100 CAPSULE, LIQUID FILLED ORAL at 08:18

## 2021-09-20 RX ADMIN — LEVETIRACETAM 1500 MG: 500 TABLET ORAL at 17:00

## 2021-09-20 RX ADMIN — BENZTROPINE MESYLATE 0.5 MG: 0.5 TABLET ORAL at 08:19

## 2021-09-20 RX ADMIN — QUETIAPINE FUMARATE 150 MG: 150 TABLET, EXTENDED RELEASE ORAL at 08:18

## 2021-09-20 RX ADMIN — GABAPENTIN 100 MG: 100 CAPSULE ORAL at 16:58

## 2021-09-20 RX ADMIN — TEMAZEPAM 15 MG: 15 CAPSULE ORAL at 23:20

## 2021-09-20 RX ADMIN — Medication 1000 UNITS: at 08:18

## 2021-09-20 RX ADMIN — CALCIUM 1 TABLET: 500 TABLET ORAL at 16:58

## 2021-09-20 RX ADMIN — CALCIUM 1 TABLET: 500 TABLET ORAL at 11:11

## 2021-09-20 RX ADMIN — LORATADINE 10 MG: 10 TABLET ORAL at 08:19

## 2021-09-20 RX ADMIN — LEVETIRACETAM 1500 MG: 500 TABLET ORAL at 08:18

## 2021-09-20 RX ADMIN — QUETIAPINE FUMARATE 50 MG: 50 TABLET, EXTENDED RELEASE ORAL at 08:19

## 2021-09-20 RX ADMIN — GABAPENTIN 100 MG: 100 CAPSULE ORAL at 23:21

## 2021-09-20 RX ADMIN — HYDROXYZINE HYDROCHLORIDE 50 MG: 25 TABLET ORAL at 16:58

## 2021-09-20 RX ADMIN — CALCIUM 1 TABLET: 500 TABLET ORAL at 08:18

## 2021-09-20 RX ADMIN — DIVALPROEX SODIUM 500 MG: 500 TABLET, DELAYED RELEASE ORAL at 08:18

## 2021-09-20 RX ADMIN — LISINOPRIL 10 MG: 10 TABLET ORAL at 08:19

## 2021-09-20 RX ADMIN — VANCOMYCIN HYDROCHLORIDE 1750 MG: 1 INJECTION, POWDER, LYOPHILIZED, FOR SOLUTION INTRAVENOUS at 16:59

## 2021-09-20 RX ADMIN — LEVOTHYROXINE SODIUM 125 MCG: 25 TABLET ORAL at 06:16

## 2021-09-20 RX ADMIN — HYDROXYZINE HYDROCHLORIDE 50 MG: 25 TABLET ORAL at 08:18

## 2021-09-20 RX ADMIN — QUETIAPINE FUMARATE 50 MG: 50 TABLET, EXTENDED RELEASE ORAL at 14:09

## 2021-09-20 RX ADMIN — INSULIN LISPRO 1 UNITS: 100 INJECTION, SOLUTION INTRAVENOUS; SUBCUTANEOUS at 07:16

## 2021-09-20 RX ADMIN — HYDROXYZINE HYDROCHLORIDE 50 MG: 25 TABLET ORAL at 23:21

## 2021-09-20 RX ADMIN — LACOSAMIDE 200 MG: 150 TABLET, FILM COATED ORAL at 08:19

## 2021-09-20 RX ADMIN — GABAPENTIN 100 MG: 100 CAPSULE ORAL at 08:18

## 2021-09-20 RX ADMIN — LACOSAMIDE 200 MG: 150 TABLET, FILM COATED ORAL at 23:20

## 2021-09-20 RX ADMIN — VANCOMYCIN HYDROCHLORIDE 1750 MG: 1 INJECTION, POWDER, LYOPHILIZED, FOR SOLUTION INTRAVENOUS at 04:04

## 2021-09-20 RX ADMIN — PRAVASTATIN SODIUM 80 MG: 40 TABLET ORAL at 16:58

## 2021-09-20 RX ADMIN — BENZTROPINE MESYLATE 0.5 MG: 0.5 TABLET ORAL at 23:20

## 2021-09-20 RX ADMIN — DOCUSATE SODIUM 100 MG: 100 CAPSULE, LIQUID FILLED ORAL at 17:00

## 2021-09-20 RX ADMIN — QUETIAPINE FUMARATE 150 MG: 150 TABLET, EXTENDED RELEASE ORAL at 14:09

## 2021-09-20 RX ADMIN — DIVALPROEX SODIUM 1000 MG: 500 TABLET, EXTENDED RELEASE ORAL at 23:21

## 2021-09-20 RX ADMIN — QUETIAPINE FUMARATE 400 MG: 50 TABLET, EXTENDED RELEASE ORAL at 23:34

## 2021-09-20 NOTE — PLAN OF CARE
Care plan reviewed and followed   Problem: PAIN - ADULT  Goal: Verbalizes/displays adequate comfort level or baseline comfort level  Description: Interventions:  - Encourage patient to monitor pain and request assistance  - Assess pain using appropriate pain scale  - Administer analgesics based on type and severity of pain and evaluate response  - Implement non-pharmacological measures as appropriate and evaluate response  - Consider cultural and social influences on pain and pain management  - Notify physician/advanced practitioner if interventions unsuccessful or patient reports new pain  Outcome: Progressing     Problem: INFECTION - ADULT  Goal: Absence or prevention of progression during hospitalization  Description: INTERVENTIONS:  - Assess and monitor for signs and symptoms of infection  - Monitor lab/diagnostic results  - Monitor all insertion sites, i e  indwelling lines, tubes, and drains  - Monitor endotracheal if appropriate and nasal secretions for changes in amount and color  - [place] appropriate cooling/warming therapies per order  - Administer medications as ordered  - Instruct and encourage patient and family to use good hand hygiene technique  - Identify and instruct in appropriate isolation precautions for identified infection/condition  Outcome: Progressing     Problem: SAFETY ADULT  Goal: Patient will remain free of falls  Description: INTERVENTIONS:  - Educate patient/family on patient safety including physical limitations  - Instruct patient to call for assistance with activity   - Consult OT/PT to assist with strengthening/mobility   - Keep Call bell within reach  - Keep bed low and locked with side rails adjusted as appropriate  - Keep care items and personal belongings within reach  - Initiate and maintain comfort rounds  - Make Fall Risk Sign visible to staff  - Offer Toileting every  Hours, in advance of need  - Initiate/Maintain alarm  - Obtain necessary fall risk management equipment: - Apply yellow socks and bracelet for high fall risk patients  - Consider moving patient to room near nurses station  Outcome: Progressing     Problem: Knowledge Deficit  Goal: Patient/family/caregiver demonstrates understanding of disease process, treatment plan, medications, and discharge instructions  Description: Complete learning assessment and assess knowledge base    Interventions:  - Provide teaching at level of understanding  - Provide teaching via preferred learning methods  Outcome: Progressing     Problem: Potential for Falls  Goal: Patient will remain free of falls  Description: INTERVENTIONS:  - Educate patient/family on patient safety including physical limitations  - Instruct patient to call for assistance with activity   - Consult OT/PT to assist with strengthening/mobility   - Keep Call bell within reach  - Keep bed low and locked with side rails adjusted as appropriate  - Keep care items and personal belongings within reach  - Initiate and maintain comfort rounds  - Make Fall Risk Sign visible to staff  - Offer Toileting every  Hours, in advance of need  - Initiate/Maintain alarm  - Obtain necessary fall risk management equipment:   - Apply yellow socks and bracelet for high fall risk patients  - Consider moving patient to room near nurses station  Outcome: Progressing     Problem: Prexisting or High Potential for Compromised Skin Integrity  Goal: Skin integrity is maintained or improved  Description: INTERVENTIONS:  - Identify patients at risk for skin breakdown  - Assess and monitor skin integrity  - Assess and monitor nutrition and hydration status  - Monitor labs   - Assess for incontinence   - Turn and reposition patient  - Assist with mobility/ambulation  - Relieve pressure over bony prominences  - Avoid friction and shearing  - Provide appropriate hygiene as needed including keeping skin clean and dry  - Evaluate need for skin moisturizer/barrier cream  - Collaborate with interdisciplinary team   - Patient/family teaching  - Consider wound care consult   Outcome: Progressing     Problem: Nutrition/Hydration-ADULT  Goal: Nutrient/Hydration intake appropriate for improving, restoring or maintaining nutritional needs  Description: Monitor and assess patient's nutrition/hydration status for malnutrition  Collaborate with interdisciplinary team and initiate plan and interventions as ordered  Monitor patient's weight and dietary intake as ordered or per policy  Utilize nutrition screening tool and intervene as necessary  Determine patient's food preferences and provide high-protein, high-caloric foods as appropriate       INTERVENTIONS:  - Monitor oral intake, urinary output, labs, and treatment plans  - Assess nutrition and hydration status and recommend course of action  - Evaluate amount of meals eaten  - Assist patient with eating if necessary   - Allow adequate time for meals  - Recommend/ encourage appropriate diets, oral nutritional supplements, and vitamin/mineral supplements  - Order, calculate, and assess calorie counts as needed  - Recommend, monitor, and adjust tube feedings and TPN/PPN based on assessed needs  - Assess need for intravenous fluids  - Provide specific nutrition/hydration education as appropriate  - Include patient/family/caregiver in decisions related to nutrition  Outcome: Progressing

## 2021-09-20 NOTE — WOUND OSTOMY CARE
Progress Note - Wound   Dayna De La Cruz 49 y o  male MRN: [id number]  Unit/Bed#: -02 Encounter: [id number]      Assessment: Patient is seen for weekly wound care follow  The patient is a 49year old male that has cerebral palsy and has a pressure wound of the left buttocks ischial area being managed by surgery with a wound VAC  Min A of 2 for rolling in the bed and has incontinence as noted in the chart   RN reports he went out for a day and returned   He is not on the low air loss mattress and RN reports it is hard to get him out she will have MD order if he needs it and cannot be offloaded in the bed and out in the chair   Assessment Findings   1  Bilateral heels dry and intact   2  Sacral is dry and intact   3  Upper back and lower back healed areas and old scarring   4  Right knee - small scabbed area           Skin care plans:  1-Hydraguard to bilateral sacrum, buttock and heels BID and PRN  2-Elevate heels to offload pressure  3-Ehob cushion in chair when out of bed  4-Moisturize skin daily with skin nourishing cream   5-Turn/reposition q2h or when medically stable for pressure re-distribution on skin  6  Surgery following Left buttocks wound for VAC therapy   Objective:    Vitals: Blood pressure 123/68, pulse 81, temperature (!) 96 9 °F (36 1 °C), temperature source Temporal, resp  rate 16, height 6' (1 829 m), weight 74 4 kg (164 lb), SpO2 90 %  ,Body mass index is 22 24 kg/m²  Left buttocks wound managed by surgery as noted below   Wound VAC is on the area     Wound 09/05/21 Pressure Injury Buttocks Left (Active)   Wound Image   09/06/21 0909       Wound 09/06/21 Back Lateral;Right;Upper (Active)   Wound Image   09/20/21 1039   Wound Description Clean;Dry; Intact 09/20/21 1039   Dee-wound Assessment Clean;Dry; Intact 09/20/21 1039   Wound Length (cm) 0 cm 09/20/21 1039   Wound Width (cm) 0 cm 09/20/21 1039   Wound Depth (cm) 0 cm 09/20/21 1039   Wound Surface Area (cm^2) 0 cm^2 09/20/21 1039   Wound Volume (cm^3) 0 cm^3 09/20/21 1039   Calculated Wound Volume (cm^3) 0 cm^3 09/20/21 1039   Drainage Amount None 09/20/21 1039   Drainage Description GISEL 09/13/21 0830   Treatments Other (Comment) 09/20/21 1039   Dressing Open to air 09/20/21 1039   Dressing Changed Reinforced 09/15/21 1046   Patient Tolerance Tolerated well 09/20/21 1039   Dressing Status Clean;Dry; Intact 09/20/21 0800       Wound 09/06/21 Back Right; Lower (Active)   Wound Image   09/20/21 1040   Wound Description Clean;Dry; Intact 09/20/21 1040   Dee-wound Assessment Clean;Dry; Intact 09/20/21 1040   Wound Length (cm) 0 cm 09/20/21 1040   Wound Width (cm) 0 cm 09/20/21 1040   Wound Depth (cm) 0 cm 09/20/21 1040   Wound Surface Area (cm^2) 0 cm^2 09/20/21 1040   Wound Volume (cm^3) 0 cm^3 09/20/21 1040   Calculated Wound Volume (cm^3) 0 cm^3 09/20/21 1040   Drainage Amount None 09/20/21 1040   Non-staged Wound Description Not applicable 50/40/58 2802   Treatments Site care 09/20/21 1040   Dressing Open to air 09/20/21 1040   Dressing Changed Reinforced 09/15/21 1046   Patient Tolerance Tolerated well 09/20/21 1040   Dressing Status Clean;Dry; Intact 09/20/21 0800       Wound 09/06/21 Traumatic Abrasion(s) Knee Anterior;Right (Active)   Wound Image   09/20/21 1041   Wound Description Clean;Dry; Intact; Brown 09/20/21 1041   Dee-wound Assessment Clean;Dry; Intact 09/20/21 1041   Wound Length (cm) 0 cm 09/20/21 1041   Wound Width (cm) 0 cm 09/20/21 1041   Wound Depth (cm) 0 cm 09/20/21 1041   Wound Surface Area (cm^2) 0 cm^2 09/20/21 1041   Wound Volume (cm^3) 0 cm^3 09/20/21 1041   Calculated Wound Volume (cm^3) 0 cm^3 09/20/21 1041   Drainage Amount None 09/20/21 1041   Non-staged Wound Description Not applicable 88/09/61 4593   Treatments Site care 09/20/21 1041   Dressing Other (Comment) 09/20/21 1041   Patient Tolerance Tolerated well 09/20/21 1041   Dressing Status Clean;Dry; Intact 09/20/21 0100       Wound care will sign off call or tiger text with concerns or questions    Surgery following for [facility] management     Brayan Pulliam RN BSN CWOCN

## 2021-09-20 NOTE — PROGRESS NOTES
Vancomycin IV Pharmacy-to-Dose Consultation    Michael Daigle is a 49 y o  male who is currently receiving Vancomycin IV with management by the Pharmacy Consult service  Assessment/Plan:  The patient was reviewed  Renal function is stable and no signs or symptoms of nephrotoxicity and/or infusion reactions were documented in the chart  Based on todays assessment, continue current vancomycin (day # 5) dosing of 1750mg IV Q12H, to be discontinued on 9/22, if the patient is still on past this date a trough will be scheduled  We will continue to follow the patients culture results and clinical progress daily      Omayra Owens, Pharmacist

## 2021-09-20 NOTE — PROGRESS NOTES
[address] [facility]  Progress Note Gabriella Khan 1965, 49 y o  male MRN: [id number]  Unit/Bed#: -02 Encounter: [id number]  Primary Care Provider: Brook Smith PA-C   Date and time admitted to hospital: 9/15/2021  7:08 PM    * S/P PICC central line placement  Assessment & Plan  · Patient was discharged on 09/15/2021 and is subsequently readmitted on the same day after he pulled his PICC line out  · PICC line was successfully replaced on 09/16/2021 by IR  · Patient remains medically stable for discharge  · See the remaining outlines below    Social problem  Assessment & Plan  · Notified by case management, that the patient's caregiver is unable to take him back home by unspecified reasons  · They are requesting placement  · Prior to placement, the patient will need to be optioned via the state  · Patient will likely complete IV antibiotic course prior to placement- antibiotics course to be completed on 9/22/2021  · Patient is otherwise medically stable for discharge      Gluteal abscess  Assessment & Plan  · Continue IV vancomycin through 09/22/2021  · Appreciate general surgical input  · Continue wound VAC    Sepsis ([facility] [address] )  Assessment & Plan  · Secondary to left gluteal abscess  · Status post an Infectious Disease evaluation on the previous admission  · Status post a completed course of IV cephalosporin on 9/12/2021  · Continue vancomycin 1500 mg IV t i d  through 09/22/2021, after which the PICC line can be officially removed as per previous ID recommendations    Bacteremia due to Gram-positive bacteria  Assessment & Plan  · Continue vancomycin as outlined above    Hypomagnesemia  Assessment & Plan  · Resolved with repletion    Acquired hypothyroidism  Assessment & Plan  · Continue levothyroxine    Essential hypertension  Assessment & Plan  · BP currently stable  · Continue lisinopril    Hyperlipidemia  Assessment & Plan  · Continue pravastatin    Generalized convulsive epilepsy [facility])  Assessment & Plan  · Continue home meds which include Keppra, Restoril, Vimpat and Depakote    Type 2 diabetes mellitus without complication, without long-term current use of insulin (HCC)  Assessment & Plan  · Oral hypoglycemic medications remain on hold  · Continue Accu-Cheks AC and HS with sliding scale coverage    Cerebral palsy (HCC)  Assessment & Plan  · Supportive care        VTE Pharmacologic Prophylaxis: VTE Score: 5 High Risk (Score >/= 5) - Pharmacological DVT Prophylaxis Ordered: enoxaparin (Lovenox)  Sequential Compression Devices Ordered  Patient Centered Rounds: I performed bedside rounds with nursing staff today  Discussions with Specialists or Other Care Team Provider: nursing, CM    Education and Discussions with Family / Patient: Updated  (care giver) at bedside  Time Spent for Care: 20 minutes  More than 50% of total time spent on counseling and coordination of care as described above  Current Length of Stay: 4 day(s)  Current Patient Status: Inpatient   Certification Statement: The patient will continue to require additional inpatient hospital stay due to need for IV antibiotics  Discharge Plan: Anticipate discharge in 48 hrs to group home  Code Status: Level 1 - Full Code    Subjective: The patient was seen and examined  The patient denies any complaints  No acute events overnight  Objective:     Vitals:   Temp (24hrs), Av 8 °F (36 °C), Min:96 7 °F (35 9 °C), Max:96 9 °F (36 1 °C)    Temp:  [96 7 °F (35 9 °C)-96 9 °F (36 1 °C)] 96 9 °F (36 1 °C)  HR:  [81-91] 81  Resp:  [16-18] 16  BP: (123-135)/(59-76) 123/68  SpO2:  [90 %-95 %] 90 %  Body mass index is 22 24 kg/m²  Input and Output Summary (last 24 hours): Intake/Output Summary (Last 24 hours) at 2021 1447  Last data filed at 2021 1201  Gross per 24 hour   Intake 480 ml   Output 1100 ml   Net -620 ml       Physical Exam:   Physical Exam  Vitals and nursing note reviewed  Constitutional:       General: He is awake  Appearance: Normal appearance  Cardiovascular:      Rate and Rhythm: Normal rate and regular rhythm  Pulmonary:      Effort: Pulmonary effort is normal       Breath sounds: Normal breath sounds  No wheezing, rhonchi or rales  Abdominal:      General: Bowel sounds are normal       Palpations: Abdomen is soft  Tenderness: There is no abdominal tenderness  Skin:     Comments: Wound vac in place left buttocks   Neurological:      Mental Status: He is alert  Mental status is at baseline  Psychiatric:         Attention and Perception: Attention normal          Mood and Affect: Mood normal          Behavior: Behavior is cooperative            Additional Data:     Labs:  Results from last 7 days   Lab Units 09/17/21  0458   WBC Thousand/uL 8 90   HEMOGLOBIN g/dL 11 2*   HEMATOCRIT % 32 8*   PLATELETS Thousands/uL 362   NEUTROS PCT % 56   LYMPHS PCT % 27   MONOS PCT % 14*   EOS PCT % 1     Results from last 7 days   Lab Units 09/17/21  0458 09/14/21  0512   SODIUM mmol/L 134 137   POTASSIUM mmol/L 4 7 4 4   CHLORIDE mmol/L 99 103   CO2 mmol/L 29 29   BUN mg/dL 19 19   CREATININE mg/dL 0 56* 0 51*   ANION GAP mmol/L 6 5   CALCIUM mg/dL 9 4 9 2   ALBUMIN g/dL  --  3 2*   TOTAL BILIRUBIN mg/dL  --  0 20   ALK PHOS U/L  --  69   ALT U/L  --  66*   AST U/L  --  27   GLUCOSE RANDOM mg/dL 126* 103*         Results from last 7 days   Lab Units 09/20/21  1102 09/20/21  0713 09/19/21  2138 09/19/21  1539 09/19/21  1146 09/19/21  0627 09/18/21  2203 09/18/21  1629 09/18/21  1114 09/18/21  0634 09/17/21  2029 09/17/21  1630   POC GLUCOSE mg/dl 146* 175* 161* 164* 151* 91 100 105 109 112 176* 116               Lines/Drains:  Invasive Devices     Peripherally Inserted Central Catheter Line            PICC Line 45/56/71 Right Basilic 4 days                Central Line:  Goal for removal: N/A - Discharging with PICC for IV ABX/medications             Imaging: No pertinent imaging reviewed      Recent Cultures (last 7 days):         Last 24 Hours Medication List:   Current Facility-Administered Medications   Medication Dose Route Frequency Provider Last Rate    acetaminophen  650 mg Oral Q6H PRN Katie Sifuentes MD      benzonatate  200 mg Oral TID PRN Katie Sifuentes MD      benztropine  0 5 mg Oral BID Katie Sifuentes MD      calcium carbonate  1 tablet Oral TID With Juan Coronado MD      cholecalciferol  1,000 Units Oral Daily Katie Sifuentes MD      clonazePAM  0 25 mg Oral Daily PRN Katie Sifuentes MD      divalproex sodium  1,000 mg Oral HS Katie Sifuentes MD      divalproex sodium  500 mg Oral Daily Katie Sifuentes MD      docusate sodium  100 mg Oral BID Katie Sifuentes MD      enoxaparin  40 mg Subcutaneous Daily Katie Sifuentes MD      gabapentin  100 mg Oral TID Katie Sifuentes MD      hydrOXYzine HCL  50 mg Oral TID Katie Sifuentes MD      insulin lispro  1-5 Units Subcutaneous TID Devan Watson MD      insulin lispro  1-5 Units Subcutaneous HS Katie Sifuentes MD      lacosamide  200 mg Oral Q12H Devan Watson MD      levETIRAcetam  1,500 mg Oral BID Katie Sifuentes MD      levothyroxine  125 mcg Oral Early Morning Katie Sifuentes MD      lisinopril  10 mg Oral Daily Katie Sifuentes MD      loratadine  10 mg Oral Daily Katie Sifuentes MD      ondansetron  4 mg Intravenous Q6H PRN Katie Sifuentes MD      polyethylene glycol  17 g Oral Daily Katie Sifuentes MD      pravastatin  80 mg Oral Daily With Eileen Wright MD      QUEtiapine  50 mg Oral BID (AM & Afternoon) Katie Sifuentes MD      And    QUEtiapine  150 mg Oral BID (AM & Afternoon) Katie Sifuentes MD      QUEtiapine (SEROquel XR) 400 mg  400 mg Oral HS Katie Sifuentes MD  temazepam  15 mg Oral HS Brandon Claudio MD      vancomycin  1,750 mg Intravenous Q12H Brandon Claudio MD 1,750 mg (09/20/21 0404)        Today, Patient Was Seen By: Joe Ortiz PA-C    **Please Note: This note may have been constructed using a voice recognition system  **

## 2021-09-20 NOTE — ASSESSMENT & PLAN NOTE
· Notified by case management, that the patient's caregiver is unable to take him back home by unspecified reasons  · They are requesting placement  · Prior to placement, the patient will need to be optioned via the state  · Patient will likely complete IV antibiotic course prior to placement- antibiotics course to be completed on 9/22/2021  · Patient is otherwise medically stable for discharge

## 2021-09-21 LAB
ANION GAP SERPL CALCULATED.3IONS-SCNC: 5 MMOL/L (ref 4–13)
BASOPHILS # BLD AUTO: 0.1 THOUSANDS/ΜL (ref 0–0.1)
BASOPHILS NFR BLD AUTO: 2 % (ref 0–2)
BUN SERPL-MCNC: 24 MG/DL (ref 7–25)
CALCIUM SERPL-MCNC: 9.4 MG/DL (ref 8.6–10.5)
CHLORIDE SERPL-SCNC: 99 MMOL/L (ref 98–107)
CO2 SERPL-SCNC: 32 MMOL/L (ref 21–31)
CREAT SERPL-MCNC: 0.56 MG/DL (ref 0.7–1.3)
EOSINOPHIL # BLD AUTO: 0.1 THOUSAND/ΜL (ref 0–0.61)
EOSINOPHIL NFR BLD AUTO: 2 % (ref 0–5)
ERYTHROCYTE [DISTWIDTH] IN BLOOD BY AUTOMATED COUNT: 14.1 % (ref 11.5–14.5)
GFR SERPL CREATININE-BSD FRML MDRD: 117 ML/MIN/1.73SQ M
GIANT PLATELETS BLD QL SMEAR: PRESENT
GLUCOSE SERPL-MCNC: 105 MG/DL (ref 65–140)
GLUCOSE SERPL-MCNC: 112 MG/DL (ref 65–99)
GLUCOSE SERPL-MCNC: 125 MG/DL (ref 65–140)
GLUCOSE SERPL-MCNC: 127 MG/DL (ref 65–140)
GLUCOSE SERPL-MCNC: 142 MG/DL (ref 65–140)
HCT VFR BLD AUTO: 32.3 % (ref 42–47)
HGB BLD-MCNC: 10.7 G/DL (ref 14–18)
LG PLATELETS BLD QL SMEAR: PRESENT
LYMPHOCYTES # BLD AUTO: 2.2 THOUSANDS/ΜL (ref 0.6–4.47)
LYMPHOCYTES NFR BLD AUTO: 38 % (ref 21–51)
MAGNESIUM SERPL-MCNC: 1.2 MG/DL (ref 1.9–2.7)
MCH RBC QN AUTO: 31.9 PG (ref 26–34)
MCHC RBC AUTO-ENTMCNC: 33.2 G/DL (ref 31–37)
MCV RBC AUTO: 96 FL (ref 81–99)
MONOCYTES # BLD AUTO: 0.8 THOUSAND/ΜL (ref 0.17–1.22)
MONOCYTES NFR BLD AUTO: 15 % (ref 2–12)
NEUTROPHILS # BLD AUTO: 2.5 THOUSANDS/ΜL (ref 1.4–6.5)
NEUTS SEG NFR BLD AUTO: 43 % (ref 42–75)
PLATELET # BLD AUTO: 204 THOUSANDS/UL (ref 149–390)
PLATELET BLD QL SMEAR: ADEQUATE
PMV BLD AUTO: 9.4 FL (ref 8.6–11.7)
POTASSIUM SERPL-SCNC: 4.7 MMOL/L (ref 3.5–5.5)
RBC # BLD AUTO: 3.37 MILLION/UL (ref 4.3–5.9)
RBC MORPH BLD: NORMAL
SODIUM SERPL-SCNC: 136 MMOL/L (ref 134–143)
WBC # BLD AUTO: 5.7 THOUSAND/UL (ref 4.8–10.8)

## 2021-09-21 PROCEDURE — 82948 REAGENT STRIP/BLOOD GLUCOSE: CPT

## 2021-09-21 PROCEDURE — 99232 SBSQ HOSP IP/OBS MODERATE 35: CPT | Performed by: PHYSICIAN ASSISTANT

## 2021-09-21 PROCEDURE — 80048 BASIC METABOLIC PNL TOTAL CA: CPT | Performed by: PHYSICIAN ASSISTANT

## 2021-09-21 PROCEDURE — 85025 COMPLETE CBC W/AUTO DIFF WBC: CPT | Performed by: PHYSICIAN ASSISTANT

## 2021-09-21 PROCEDURE — 83735 ASSAY OF MAGNESIUM: CPT | Performed by: PHYSICIAN ASSISTANT

## 2021-09-21 RX ORDER — MAGNESIUM SULFATE HEPTAHYDRATE 40 MG/ML
4 INJECTION, SOLUTION INTRAVENOUS ONCE
Status: COMPLETED | OUTPATIENT
Start: 2021-09-21 | End: 2021-09-22

## 2021-09-21 RX ADMIN — MAGNESIUM SULFATE HEPTAHYDRATE 4 G: 40 INJECTION, SOLUTION INTRAVENOUS at 08:52

## 2021-09-21 RX ADMIN — ONDANSETRON 4 MG: 2 INJECTION INTRAMUSCULAR; INTRAVENOUS at 01:08

## 2021-09-21 RX ADMIN — DIVALPROEX SODIUM 500 MG: 500 TABLET, DELAYED RELEASE ORAL at 08:41

## 2021-09-21 RX ADMIN — QUETIAPINE FUMARATE 150 MG: 150 TABLET, EXTENDED RELEASE ORAL at 13:40

## 2021-09-21 RX ADMIN — LACOSAMIDE 200 MG: 150 TABLET, FILM COATED ORAL at 22:21

## 2021-09-21 RX ADMIN — QUETIAPINE FUMARATE 400 MG: 50 TABLET, EXTENDED RELEASE ORAL at 22:22

## 2021-09-21 RX ADMIN — GABAPENTIN 100 MG: 100 CAPSULE ORAL at 22:22

## 2021-09-21 RX ADMIN — VANCOMYCIN HYDROCHLORIDE 1750 MG: 1 INJECTION, POWDER, LYOPHILIZED, FOR SOLUTION INTRAVENOUS at 04:22

## 2021-09-21 RX ADMIN — ACETAMINOPHEN 650 MG: 325 TABLET ORAL at 17:10

## 2021-09-21 RX ADMIN — LISINOPRIL 10 MG: 10 TABLET ORAL at 08:41

## 2021-09-21 RX ADMIN — LACOSAMIDE 200 MG: 150 TABLET, FILM COATED ORAL at 08:41

## 2021-09-21 RX ADMIN — VANCOMYCIN HYDROCHLORIDE 1750 MG: 1 INJECTION, POWDER, LYOPHILIZED, FOR SOLUTION INTRAVENOUS at 16:38

## 2021-09-21 RX ADMIN — HYDROXYZINE HYDROCHLORIDE 50 MG: 25 TABLET ORAL at 16:41

## 2021-09-21 RX ADMIN — LEVOTHYROXINE SODIUM 125 MCG: 25 TABLET ORAL at 05:38

## 2021-09-21 RX ADMIN — BENZTROPINE MESYLATE 0.5 MG: 0.5 TABLET ORAL at 22:21

## 2021-09-21 RX ADMIN — QUETIAPINE FUMARATE 50 MG: 50 TABLET, EXTENDED RELEASE ORAL at 08:40

## 2021-09-21 RX ADMIN — ENOXAPARIN SODIUM 40 MG: 100 INJECTION SUBCUTANEOUS at 08:45

## 2021-09-21 RX ADMIN — LEVETIRACETAM 1500 MG: 500 TABLET ORAL at 17:10

## 2021-09-21 RX ADMIN — QUETIAPINE FUMARATE 150 MG: 150 TABLET, EXTENDED RELEASE ORAL at 08:38

## 2021-09-21 RX ADMIN — CALCIUM 1 TABLET: 500 TABLET ORAL at 16:40

## 2021-09-21 RX ADMIN — GABAPENTIN 100 MG: 100 CAPSULE ORAL at 16:41

## 2021-09-21 RX ADMIN — PRAVASTATIN SODIUM 80 MG: 40 TABLET ORAL at 16:40

## 2021-09-21 RX ADMIN — BENZTROPINE MESYLATE 0.5 MG: 0.5 TABLET ORAL at 07:31

## 2021-09-21 RX ADMIN — CALCIUM 1 TABLET: 500 TABLET ORAL at 07:31

## 2021-09-21 RX ADMIN — HYDROXYZINE HYDROCHLORIDE 50 MG: 25 TABLET ORAL at 08:40

## 2021-09-21 RX ADMIN — LEVETIRACETAM 1500 MG: 500 TABLET ORAL at 08:41

## 2021-09-21 RX ADMIN — DOCUSATE SODIUM 100 MG: 100 CAPSULE, LIQUID FILLED ORAL at 08:40

## 2021-09-21 RX ADMIN — DIVALPROEX SODIUM 1000 MG: 500 TABLET, EXTENDED RELEASE ORAL at 22:21

## 2021-09-21 RX ADMIN — TEMAZEPAM 15 MG: 15 CAPSULE ORAL at 22:21

## 2021-09-21 RX ADMIN — DOCUSATE SODIUM 100 MG: 100 CAPSULE, LIQUID FILLED ORAL at 17:10

## 2021-09-21 RX ADMIN — Medication 1000 UNITS: at 08:41

## 2021-09-21 RX ADMIN — HYDROXYZINE HYDROCHLORIDE 50 MG: 25 TABLET ORAL at 22:22

## 2021-09-21 RX ADMIN — GABAPENTIN 100 MG: 100 CAPSULE ORAL at 08:40

## 2021-09-21 RX ADMIN — CALCIUM 1 TABLET: 500 TABLET ORAL at 12:36

## 2021-09-21 RX ADMIN — QUETIAPINE FUMARATE 50 MG: 50 TABLET, EXTENDED RELEASE ORAL at 13:40

## 2021-09-21 RX ADMIN — LORATADINE 10 MG: 10 TABLET ORAL at 08:40

## 2021-09-21 NOTE — CASE MANAGEMENT
Case Management Progress Note    Patient name [place] Zelaya  Location /-77 MRN [id number]  : 1965 Date 2021       LOS (days): 5  Geometric Mean LOS (GMLOS) (days): 2 80  Days to GMLOS:-2 1        OBJECTIVE:  Bundle(if applicable):    Current admission status: Inpatient  Preferred Pharmacy:   [address] [place] [address] [address] [place], [place] [address]  Phone: [phone] Fax: [phone]    Primary Care Provider: Robson Jarvis PA-C    Primary Insurance: MEDICARE  Secondary Insurance: [place] [employer]    PROGRESS NOTE:  Cm met with the pt;s and his sister today at the bedside to review d/c plan, IMM was reviewed with his sister, she is in agreement with the pt returning  to the group home tomorrow after his last dose of antibiotics and then the picc line will be removed, pt will need to have his vac dressing changed tomorrow and the home vac applied upon d/c , pt is active with [facility], cm attempted to reach the care giver at 13:11 am to # [phone] and I had to leave a message, cm called again at 15;42 and I spoke wit Jake Spears and she is able to transport the pt back at 5:30pm, pt will have the antibiotic given a little earlier then scheduled, cm will continue to follow for any additional d/c needs

## 2021-09-21 NOTE — PROGRESS NOTES
Vancomycin IV Pharmacy-to-Dose Consultation    Amanda Hernandez is a 49 y o  male who is currently receiving Vancomycin IV with management by the Pharmacy Consult service  Assessment/Plan:  The patient was reviewed  Renal function is stable and no signs or symptoms of nephrotoxicity and/or infusion reactions were documented in the chart  Based on todays assessment, continue current vancomycin (day # 6) dosing of 1750 mg IV q12, with a plan for treatment completion post 9/22 PM dose  We will continue to follow the patients culture results and clinical progress daily      Marina Joshi, Pharmacist

## 2021-09-21 NOTE — ASSESSMENT & PLAN NOTE
· Notified by case management, that the patient's caregiver is unable to take him back home by unspecified reasons  · They were requesting placement however since patient will complete IV antibiotics tomorrow, caregiver agreeable to take him back tomorrow  · Patient is otherwise medically stable for discharge

## 2021-09-21 NOTE — PROGRESS NOTES
[address] [facility]  Progress Note Nisha White 1965, 49 y o  male MRN: [id number]  Unit/Bed#: -02 Encounter: [id number]  Primary Care Provider: Saniya Miranda PA-C   Date and time admitted to hospital: 9/15/2021  7:08 PM    * S/P PICC central line placement  Assessment & Plan  · Patient was discharged on 09/15/2021 and is subsequently readmitted on the same day after he pulled his PICC line out  · PICC line was successfully replaced on 09/16/2021 by IR  · Patient remains medically stable for discharge  · See the remaining outlines below    Social problem  Assessment & Plan  · Notified by case management, that the patient's caregiver is unable to take him back home by unspecified reasons  · They were requesting placement however since patient will complete IV antibiotics tomorrow, caregiver agreeable to take him back tomorrow  · Patient is otherwise medically stable for discharge           Gluteal abscess  Assessment & Plan  · Continue IV vancomycin through 09/22/2021  · Appreciate general surgical input  · Continue wound VAC    Sepsis ([facility] [address] )  Assessment & Plan  · Secondary to left gluteal abscess  · Status post an Infectious Disease evaluation on the previous admission  · Status post a completed course of IV cephalosporin on 9/12/2021  · Continue vancomycin 1500 mg IV t i d  through 09/22/2021, after which the PICC line can be officially removed as per previous ID recommendations    Bacteremia due to Gram-positive bacteria  Assessment & Plan  · Continue vancomycin as outlined above    Hypomagnesemia  Assessment & Plan  · Magnesium of 1 2 today  · Will give IV magnesium 4 gram today  · Repeat labs tomorrow    Acquired hypothyroidism  Assessment & Plan  · Continue levothyroxine    Essential hypertension  Assessment & Plan  · BP currently stable  · Continue lisinopril    Hyperlipidemia  Assessment & Plan  · Continue pravastatin    Generalized convulsive epilepsy [facility])  Assessment & Plan  · Continue home meds which include Keppra, Restoril, Vimpat and Depakote    Type 2 diabetes mellitus without complication, without long-term current use of insulin (HCC)  Assessment & Plan  · Oral hypoglycemic medications remain on hold  · Continue Accu-Cheks AC and HS with sliding scale coverage    Cerebral palsy (HCC)  Assessment & Plan  · Supportive care        VTE Pharmacologic Prophylaxis: VTE Score: 5 High Risk (Score >/= 5) - Pharmacological DVT Prophylaxis Ordered: enoxaparin (Lovenox)  Sequential Compression Devices Ordered  Patient Centered Rounds: I performed bedside rounds with nursing staff today  Discussions with Specialists or Other Care Team Provider: nursing, CM    Education and Discussions with Family / Patient: Updated  (sister) at bedside  Time Spent for Care: 20 minutes  More than 50% of total time spent on counseling and coordination of care as described above  Current Length of Stay: 5 day(s)  Current Patient Status: Inpatient   Certification Statement: The patient will continue to require additional inpatient hospital stay due to need for IV antibiotics  Discharge Plan: Anticipate discharge tomorrow to group home  Code Status: Level 1 - Full Code    Subjective: The patient was seen and examined  The patient offers no complaints  Patient's sister is visiting at bedside  Objective:     Vitals:   Temp (24hrs), Av 5 °F (36 4 °C), Min:97 3 °F (36 3 °C), Max:97 9 °F (36 6 °C)    Temp:  [97 3 °F (36 3 °C)-97 9 °F (36 6 °C)] 97 3 °F (36 3 °C)  HR:  [78-93] 78  Resp:  [16-18] 16  BP: (131-151)/(64-68) 135/64  SpO2:  [95 %-100 %] 96 %  Body mass index is 22 24 kg/m²  Input and Output Summary (last 24 hours):      Intake/Output Summary (Last 24 hours) at 2021 1555  Last data filed at 2021 0901  Gross per 24 hour   Intake 1440 ml   Output 1800 ml   Net -360 ml       Physical Exam:   Physical Exam  Vitals and nursing note reviewed  Constitutional:       General: He is awake  Appearance: He is ill-appearing (chronically)  Cardiovascular:      Rate and Rhythm: Normal rate and regular rhythm  Pulmonary:      Effort: Pulmonary effort is normal       Breath sounds: Normal breath sounds  No wheezing, rhonchi or rales  Abdominal:      General: Bowel sounds are normal       Palpations: Abdomen is soft  Tenderness: There is no abdominal tenderness  Skin:     Comments: Wound VAC in place left buttocks   Neurological:      Mental Status: He is alert  Mental status is at baseline  Psychiatric:         Attention and Perception: Attention normal          Mood and Affect: Mood normal          Behavior: Behavior is cooperative  Additional Data:     Labs:  Results from last 7 days   Lab Units 09/21/21  0457   WBC Thousand/uL 5 70   HEMOGLOBIN g/dL 10 7*   HEMATOCRIT % 32 3*   PLATELETS Thousands/uL 204   NEUTROS PCT % 43   LYMPHS PCT % 38   MONOS PCT % 15*   EOS PCT % 2     Results from last 7 days   Lab Units 09/21/21  0457   SODIUM mmol/L 136   POTASSIUM mmol/L 4 7   CHLORIDE mmol/L 99   CO2 mmol/L 32*   BUN mg/dL 24   CREATININE mg/dL 0 56*   ANION GAP mmol/L 5   CALCIUM mg/dL 9 4   GLUCOSE RANDOM mg/dL 112*         Results from last 7 days   Lab Units 09/21/21  1104 09/21/21  0630 09/20/21  1957 09/20/21  1458 09/20/21  1102 09/20/21  0713 09/19/21  2138 09/19/21  1539 09/19/21  1146 09/19/21  0627 09/18/21  2203 09/18/21  1629   POC GLUCOSE mg/dl 125 105 141* 131 146* 175* 161* 164* 151* 91 100 105               Lines/Drains:  Invasive Devices     Peripherally Inserted Central Catheter Line            PICC Line 62/92/22 Right Basilic 5 days                Central Line:  Goal for removal: Will discontinue when meds requiring line are completed  Imaging: No pertinent imaging reviewed      Recent Cultures (last 7 days):         Last 24 Hours Medication List:   Current Facility-Administered Medications Medication Dose Route Frequency Provider Last Rate    acetaminophen  650 mg Oral Q6H PRN Wenceslao Bell MD      benzonatate  200 mg Oral TID PRN Wenceslao Bell MD      benztropine  0 5 mg Oral BID Wenceslao Bell MD      calcium carbonate  1 tablet Oral TID With Meals Wenceslao Bell MD      cholecalciferol  1,000 Units Oral Daily Wenceslao Bell MD      clonazePAM  0 25 mg Oral Daily PRN Wenceslao Bell MD      divalproex sodium  1,000 mg Oral HS Wenceslao Bell MD      divalproex sodium  500 mg Oral Daily Wenceslao Bell MD      docusate sodium  100 mg Oral BID Wenceslao Bell MD      enoxaparin  40 mg Subcutaneous Daily Wenceslao Bell MD      gabapentin  100 mg Oral TID Wenceslao Bell MD      hydrOXYzine HCL  50 mg Oral TID Wenceslao Bell MD      insulin lispro  1-5 Units Subcutaneous TID Octavio Ac MD      insulin lispro  1-5 Units Subcutaneous HS Wenceslao Bell MD      lacosamide  200 mg Oral Q12H Octavio Ac MD      levETIRAcetam  1,500 mg Oral BID Wenceslao Bell MD      levothyroxine  125 mcg Oral Early Morning Wenceslao Bell MD      lisinopril  10 mg Oral Daily Wenceslao Bell MD      loratadine  10 mg Oral Daily Wenceslao Bell MD      ondansetron  4 mg Intravenous Q6H PRN Wenceslao Bell MD      polyethylene glycol  17 g Oral Daily Wenceslao Bell MD      pravastatin  80 mg Oral Daily With Amy Oglesby MD      QUEtiapine  50 mg Oral BID (AM & Afternoon) Wenceslao Bell MD      And    QUEtiapine  150 mg Oral BID (AM & Afternoon) Wenceslao Bell MD      QUEtiapine (SEROquel XR) 400 mg  400 mg Oral HS Larry Veloz MD      temazepam  15 mg Oral HS Wenceslao Bell MD      vancomycin  1,750 mg Intravenous Q12H Wenceslao Bell MD 1,750 mg (09/21/21 9032)        Today, Patient Was Seen By: Joe Ortiz PA-C    **Please Note: This note may have been constructed using a voice recognition system  **

## 2021-09-22 VITALS
TEMPERATURE: 98 F | DIASTOLIC BLOOD PRESSURE: 63 MMHG | RESPIRATION RATE: 18 BRPM | WEIGHT: 164 LBS | BODY MASS INDEX: 22.21 KG/M2 | OXYGEN SATURATION: 95 % | HEART RATE: 78 BPM | HEIGHT: 72 IN | SYSTOLIC BLOOD PRESSURE: 131 MMHG

## 2021-09-22 LAB
GLUCOSE SERPL-MCNC: 103 MG/DL (ref 65–140)
GLUCOSE SERPL-MCNC: 166 MG/DL (ref 65–140)
GLUCOSE SERPL-MCNC: 65 MG/DL (ref 65–140)
MAGNESIUM SERPL-MCNC: 1.7 MG/DL (ref 1.9–2.7)

## 2021-09-22 PROCEDURE — 99238 HOSP IP/OBS DSCHRG MGMT 30/<: CPT | Performed by: PHYSICIAN ASSISTANT

## 2021-09-22 PROCEDURE — 82948 REAGENT STRIP/BLOOD GLUCOSE: CPT

## 2021-09-22 PROCEDURE — 83735 ASSAY OF MAGNESIUM: CPT | Performed by: PHYSICIAN ASSISTANT

## 2021-09-22 RX ORDER — MAGNESIUM SULFATE HEPTAHYDRATE 40 MG/ML
2 INJECTION, SOLUTION INTRAVENOUS ONCE
Status: COMPLETED | OUTPATIENT
Start: 2021-09-22 | End: 2021-09-22

## 2021-09-22 RX ADMIN — POLYETHYLENE GLYCOL 3350 17 G: 17 POWDER, FOR SOLUTION ORAL at 08:50

## 2021-09-22 RX ADMIN — ENOXAPARIN SODIUM 40 MG: 100 INJECTION SUBCUTANEOUS at 08:50

## 2021-09-22 RX ADMIN — LISINOPRIL 10 MG: 10 TABLET ORAL at 08:55

## 2021-09-22 RX ADMIN — MAGNESIUM SULFATE HEPTAHYDRATE 2 G: 40 INJECTION, SOLUTION INTRAVENOUS at 08:51

## 2021-09-22 RX ADMIN — Medication 1000 UNITS: at 08:55

## 2021-09-22 RX ADMIN — VANCOMYCIN HYDROCHLORIDE 1750 MG: 1 INJECTION, POWDER, LYOPHILIZED, FOR SOLUTION INTRAVENOUS at 15:08

## 2021-09-22 RX ADMIN — CALCIUM 1 TABLET: 500 TABLET ORAL at 16:01

## 2021-09-22 RX ADMIN — LEVETIRACETAM 1500 MG: 500 TABLET ORAL at 08:55

## 2021-09-22 RX ADMIN — HYDROXYZINE HYDROCHLORIDE 50 MG: 25 TABLET ORAL at 15:11

## 2021-09-22 RX ADMIN — INSULIN LISPRO 1 UNITS: 100 INJECTION, SOLUTION INTRAVENOUS; SUBCUTANEOUS at 15:57

## 2021-09-22 RX ADMIN — BENZTROPINE MESYLATE 0.5 MG: 0.5 TABLET ORAL at 07:33

## 2021-09-22 RX ADMIN — QUETIAPINE FUMARATE 50 MG: 50 TABLET, EXTENDED RELEASE ORAL at 13:35

## 2021-09-22 RX ADMIN — QUETIAPINE FUMARATE 150 MG: 150 TABLET, EXTENDED RELEASE ORAL at 13:35

## 2021-09-22 RX ADMIN — LEVOTHYROXINE SODIUM 125 MCG: 25 TABLET ORAL at 05:42

## 2021-09-22 RX ADMIN — LORATADINE 10 MG: 10 TABLET ORAL at 08:55

## 2021-09-22 RX ADMIN — DOCUSATE SODIUM 100 MG: 100 CAPSULE, LIQUID FILLED ORAL at 08:55

## 2021-09-22 RX ADMIN — VANCOMYCIN HYDROCHLORIDE 1750 MG: 1 INJECTION, POWDER, LYOPHILIZED, FOR SOLUTION INTRAVENOUS at 04:16

## 2021-09-22 RX ADMIN — QUETIAPINE FUMARATE 50 MG: 50 TABLET, EXTENDED RELEASE ORAL at 08:55

## 2021-09-22 RX ADMIN — HYDROXYZINE HYDROCHLORIDE 50 MG: 25 TABLET ORAL at 08:55

## 2021-09-22 RX ADMIN — DIVALPROEX SODIUM 500 MG: 500 TABLET, DELAYED RELEASE ORAL at 09:51

## 2021-09-22 RX ADMIN — CALCIUM 1 TABLET: 500 TABLET ORAL at 07:33

## 2021-09-22 RX ADMIN — QUETIAPINE FUMARATE 150 MG: 150 TABLET, EXTENDED RELEASE ORAL at 08:55

## 2021-09-22 RX ADMIN — GABAPENTIN 100 MG: 100 CAPSULE ORAL at 08:55

## 2021-09-22 RX ADMIN — CALCIUM 1 TABLET: 500 TABLET ORAL at 12:15

## 2021-09-22 RX ADMIN — GABAPENTIN 100 MG: 100 CAPSULE ORAL at 15:11

## 2021-09-22 RX ADMIN — PRAVASTATIN SODIUM 80 MG: 40 TABLET ORAL at 16:01

## 2021-09-22 RX ADMIN — LACOSAMIDE 200 MG: 150 TABLET, FILM COATED ORAL at 08:55

## 2021-09-22 NOTE — ASSESSMENT & PLAN NOTE
· Resolved, Secondary to left gluteal abscess  · Status post an Infectious Disease evaluation on the previous admission  · Status post a completed course of IV cephalosporin on 9/12/2021  · The patient completed course of IV vancomycin today 09/22/2021 prior to discharge   PICC line removed as per previous ID recommendations

## 2021-09-22 NOTE — PROGRESS NOTES
Discharge instructions given to caregiver  Caregiver verbalizing the understanding of instructions  Went over what to do with wound vac- if it comes off and who to contact for a beeping machine or help  Pt wc to caregivers car and wound vac supplies were put in caregivers car

## 2021-09-22 NOTE — ASSESSMENT & PLAN NOTE
· Patient was discharged on 09/15/2021 and is subsequently readmitted on the same day after he pulled his PICC line out  · PICC line was successfully replaced on 09/16/2021 by IR  · PICC line removed on 9/22/2021 after completion of antibiotic course  · Patient medically stable for discharge  · See the remaining outlines below

## 2021-09-22 NOTE — PROGRESS NOTES
Vancomycin IV Pharmacy-to-Dose Consultation    Tiesha Craig is a 49 y o  male who is currently receiving Vancomycin IV with management by the Pharmacy Consult service  Assessment/Plan:  The patient was reviewed  Renal function is stable and no signs or symptoms of nephrotoxicity and/or infusion reactions were documented in the chart  Based on todays assessment, continue current vancomycin (day # 7) dosing of 1750 mg IV q12, with a plan for completion of therapy upon 9/22 PM dose  We will continue to follow the patients culture results and clinical progress daily      Matias Agee, Pharmacist

## 2021-09-22 NOTE — DISCHARGE SUMMARY
[facility]  Discharge- Vani Escobar 1965, 49 y o  male MRN: [id number]  Unit/Bed#: -02 Encounter: [id number]  Primary Care Provider: Tim Rojas PA-C   Date and time admitted to hospital: 9/15/2021  7:08 PM    * S/P PICC central line placement  Assessment & Plan  · Patient was discharged on 09/15/2021 and is subsequently readmitted on the same day after he pulled his PICC line out  · PICC line was successfully replaced on 09/16/2021 by IR  · PICC line removed on 9/22/2021 after completion of antibiotic course  · Patient medically stable for discharge  · See the remaining outlines below    Social problem  Assessment & Plan  · Notified by case management, that the patient's caregiver wass unable to take him back home while he was receiving IV antibiotics  They were requesting placement however since patient completed IV antibiotics today 9/22/2021 caregiver agreeable to take him back  PICC line removed prior to discharge  Gluteal abscess  Assessment & Plan  · The patient completed his course of IV vancomycin 09/22/2021  · Appreciate general surgical input  · Continue wound VAC  · Outpatient follow-up with wound care    Sepsis [facility])  Assessment & Plan  · Resolved, Secondary to left gluteal abscess  · Status post an Infectious Disease evaluation on the previous admission  · Status post a completed course of IV cephalosporin on 9/12/2021  · The patient completed course of IV vancomycin today 09/22/2021 prior to discharge  PICC line removed as per previous ID recommendations    Bacteremia due to Gram-positive bacteria  Assessment & Plan  · Completed course of IV vancomycin on 9/22/2021 as outlined above    Hypomagnesemia  Assessment & Plan  · Magnesium of 1 2 on 9/21  Patient was give IV magnesium 4 gram  Repeat magnesium of 1 7, patient received an additional 2 g IV magnesium prior to discharge       Acquired hypothyroidism  Assessment & Plan  · Continue levothyroxine    Essential hypertension  Assessment & Plan  · BP currently stable  · Continue lisinopril    Hyperlipidemia  Assessment & Plan  · Continue statin    Generalized convulsive epilepsy ([facility] [address] )  Assessment & Plan  · Continue home meds which include Keppra, Restoril, Vimpat and Depakote    Type 2 diabetes mellitus without complication, without long-term current use of insulin ([facility] [address] )  Assessment & Plan  · Resume home regimen    Cerebral palsy [facility])  Assessment & Plan  · Supportive care      Medical Problems     Resolved Problems  Date Reviewed: 9/22/2021    None              Discharging Physician / Practitioner: Oswald Espana PA-C  PCP: Vishnu Emanuel PA-C  Admission Date:   Admission Orders (From admission, onward)     Ordered        09/16/21 2229  Inpatient Admission  Once         09/15/21 2026  Place in Observation  Once                   Discharge Date: 09/22/21    Consultations During Hospital Stay:  · General surgery  · Interventional Radiology    Procedures Performed:   · PICC line insertion on 9/16/2021 by IR Dr Clay Justice    Significant Findings / Test Results:   IR PICC line placement single lumen (preferred for home anitbiotics/medications)    Result Date: 9/16/2021  · Impression: Technically successful placement of PICC using sonographic and fluoroscopic guidance  This is the end of the clinically relevant portion of this report  Subsequent information is for compliance, documentation, and coding purposes  In the process of informed consent, information was communicated, verbally, to the patient regarding the procedure  The patient was informed of; the name of the procedure, nature of the procedure, nature of the condition, risks, benefits, alternatives, and potential complications, as well as the consequences of not having the procedure  All the patient's questions were answered  Informed consent was signed    Quoted risks include infection, as well as a 5% risk of thrombosis of the entry vein  Chlorhexidine and alcohol was used for cleansing and sterile preparation of the skin  This was allowed to dry prior to the application of sterile draping  Maximal sterile barrier technique, according to current guidelines, were utilized  These include, but are not limited to: Hat, mask, and shoe covers for all staff  Sterile gown and gloves for all operators  A sterile cover was used for the ultrasound probe  The patient was covered, from head to toe, in sterile drapes  Timeout was performed, with all team members present, and in agreement  Confirmation of patient, procedure, laterally, allergies, and all needed equipment was performed  When ultrasound was used for needle entry guidance, into the vein, static and real-time images of needle entry are obtained, and archived  FLUOROSCOPY TIME: 0 2 Minutes RADIATION DOSE: 1 mGy PROCEDURE: PIC line PREPROCEDURE DIAGNOSIS: Please see "history ", above POSTPROCEDURE DIAGNOSIS: Same ANTIBIOTICS: None SPECIMEN: None ESTIMATED BLOOD LOSS: None ANESTHESIA: Local Workstation performed: [id number]       Incidental Findings:   · none     Test Results Pending at Discharge (will require follow up):   · none     Outpatient Tests Requested:  · none    Complications:  none    Reason for Admission: Patient removed PICC line prior to IV antibiotic completion  Hospital Course:   Augusto Daniels is a 49 y o  male patient who originally presented to the hospital on 9/15/2021 due to PICC line removed  Patient was discharged earlier on the day of admission on IV vancomycin  PICC line was placed during that admission  Patient has a history of cognitive dysfunction secondary to cerebral palsy  After being discharged on 9/15/2021 the patient pulled out his PICC line accidentally at home  Patient brought back to the ER for further evaluation  Unfortunately IR was unavailable on day of admission for PICC line replacement    Patient was admitted for observation and IR evaluation for PICC line placement        Please see above list of diagnoses and related plan for additional information  Condition at Discharge: fair    Discharge Day Visit / Exam:   Subjective:    Vitals: Blood Pressure: 131/63 (09/22/21 0715)  Pulse: 78 (09/22/21 0715)  Temperature: 98 °F (36 7 °C) (09/22/21 0715)  Temp Source: Tympanic (09/21/21 2245)  Respirations: 18 (09/22/21 0715)  Height: 6' (182 9 cm) (09/17/21 1522)  Weight - Scale: 74 4 kg (164 lb) (09/17/21 1522)  SpO2: 95 % (09/22/21 0715)  Exam:   Physical Exam  Vitals and nursing note reviewed  HENT:      Head: Normocephalic and atraumatic  Cardiovascular:      Rate and Rhythm: Normal rate and regular rhythm  Pulmonary:      Breath sounds: Normal breath sounds  No wheezing, rhonchi or rales  Abdominal:      General: There is no distension  Palpations: Abdomen is soft  Tenderness: There is no abdominal tenderness  Skin:     General: Skin is warm and dry  Neurological:      General: No focal deficit present  Mental Status: He is alert  Mental status is at baseline  Discharge instructions/Information to patient and family:   See after visit summary for information provided to patient and family  Provisions for Follow-Up Care:  See after visit summary for information related to follow-up care and any pertinent home health orders  Disposition:   Other: Group home    Planned Readmission: no     Discharge Statement:  I spent 25 minutes discharging the patient  This time was spent on the day of discharge  I had direct contact with the patient on the day of discharge  Greater than 50% of the total time was spent examining patient, answering all patient questions, arranging and discussing plan of care with patient as well as directly providing post-discharge instructions  Additional time then spent on discharge activities      Discharge Medications:  See after visit summary for reconciled discharge medications provided to patient and/or family        **Please Note: This note may have been constructed using a voice recognition system**

## 2021-09-22 NOTE — ASSESSMENT & PLAN NOTE
· Magnesium of 1 2 on 9/21  Patient was give IV magnesium 4 gram  Repeat magnesium of 1 7, patient received an additional 2 g IV magnesium prior to discharge

## 2021-09-22 NOTE — ASSESSMENT & PLAN NOTE
· The patient completed his course of IV vancomycin 09/22/2021  · Appreciate general surgical input  · Continue wound VAC  · Outpatient follow-up with wound care

## 2021-09-22 NOTE — ASSESSMENT & PLAN NOTE
· Notified by case management, that the patient's caregiver wass unable to take him back home while he was receiving IV antibiotics  They were requesting placement however since patient completed IV antibiotics today 9/22/2021 caregiver agreeable to take him back  PICC line removed prior to discharge

## 2021-09-22 NOTE — CASE MANAGEMENT
Case Management Discharge Planning Note    Patient name Eamon Hess  Location /-13 MRN [id number]  : 1965 Date 2021       Current Admission Date: 9/15/2021  Current Admission Diagnosis:  S/P PICC central line placement  Previous Admission - Discharge Date:9/15/21   LOS (days): 6  Geometric Mean LOS (GMLOS) (days): 2 80  Days to GMLOS:-2 9 Previous Discharge Diagnosis:  There are no discharge diagnoses documented for the most recent discharge  OBJECTIVE:  Risk of Unplanned Readmission Score: 25   Bundle(if applicable):    Current admission status: Inpatient  Preferred Pharmacy:   [address] [place] [address] [address] [facility]  [address] [place] [id number]  Phone: [phone] Fax: [phone]    Primary Care Provider: Darrian Sue PA-C    Primary Insurance: MEDICARE  Secondary Insurance: [place] [employer]    DISCHARGE DETAILS:    Discharge planning discussed with[de-identified] spoke to pt and Isaiah Zambrano at 15:55 and left a mesage at 12:26  cm also called his sister Leta Marquis at 16:10 to # [phone]  Freedom of Choice: Yes  Comments - Freedom of Choice: pt was given choice for dme and hhc    Contacts  Patient Contacts: Sil Mejia  Relationship to Patient[de-identified] Other (Comment) (care giver)  Contact Method: Phone  Phone Number: [phone]  Reason/Outcome: Discharge [address]         Is the patient interested in Juliane Florentino at discharge?: Yes    DME Referral Provided  Referral made for DME?: Yes  DME referral completed for the following items[de-identified] Wheelchair, Shower Chair  DME Supplier Name[de-identified] [facility] [employer]    Other Referral/Resources/Interventions Provided:  Referral Comments: dme equipment ordred through [employer]    We would like to be able to fill any required prescriptions on discharge at our [address] [place] [address] and have them delivered to you at discharge in your room    Would you like to participate in this program? : No - Declined    Discharge Destination Plan[de-identified] Home (home with care giver and slvna)  Transportation at Discharge?: Yes      Daisy Ring is aware of the d/c , pt to be d/c after his last Iv antibiotic and picc line will be removed, dme equipment was ordered as requested, pt and family are in agreement with the d/c plan, care giver will transport the pt home                                  Family notified[de-identified] care giver and sister were called

## 2021-09-22 NOTE — PLAN OF CARE
Care plan reviewed and followed      Problem: PAIN - ADULT  Goal: Verbalizes/displays adequate comfort level or baseline comfort level  Description: Interventions:  - Encourage patient to monitor pain and request assistance  - Assess pain using appropriate pain scale  - Administer analgesics based on type and severity of pain and evaluate response  - Implement non-pharmacological measures as appropriate and evaluate response  - Consider cultural and social influences on pain and pain management  - Notify physician/advanced practitioner if interventions unsuccessful or patient reports new pain  Outcome: Progressing     Problem: INFECTION - ADULT  Goal: Absence or prevention of progression during hospitalization  Description: INTERVENTIONS:  - Assess and monitor for signs and symptoms of infection  - Monitor lab/diagnostic results  - Monitor all insertion sites, i e  indwelling lines, tubes, and drains  - Monitor endotracheal if appropriate and nasal secretions for changes in amount and color  - [place] appropriate cooling/warming therapies per order  - Administer medications as ordered  - Instruct and encourage patient and family to use good hand hygiene technique  - Identify and instruct in appropriate isolation precautions for identified infection/condition  Outcome: Progressing     Problem: SAFETY ADULT  Goal: Patient will remain free of falls  Description: INTERVENTIONS:  - Educate patient/family on patient safety including physical limitations  - Instruct patient to call for assistance with activity   - Consult OT/PT to assist with strengthening/mobility   - Keep Call bell within reach  - Keep bed low and locked with side rails adjusted as appropriate  - Keep care items and personal belongings within reach  - Initiate and maintain comfort rounds  - Make Fall Risk Sign visible to staff  - Offer Toileting every  Hours, in advance of need  - Initiate/Maintain alarm  - Obtain necessary fall risk management equipment:   - Apply yellow socks and bracelet for high fall risk patients  - Consider moving patient to room near nurses station  Outcome: Progressing     Problem: Knowledge Deficit  Goal: Patient/family/caregiver demonstrates understanding of disease process, treatment plan, medications, and discharge instructions  Description: Complete learning assessment and assess knowledge base    Interventions:  - Provide teaching at level of understanding  - Provide teaching via preferred learning methods  Outcome: Progressing     Problem: Potential for Falls  Goal: Patient will remain free of falls  Description: INTERVENTIONS:  - Educate patient/family on patient safety including physical limitations  - Instruct patient to call for assistance with activity   - Consult OT/PT to assist with strengthening/mobility   - Keep Call bell within reach  - Keep bed low and locked with side rails adjusted as appropriate  - Keep care items and personal belongings within reach  - Initiate and maintain comfort rounds  - Make Fall Risk Sign visible to staff  - Offer Toileting every  Hours, in advance of need  - Initiate/Maintain alarm  - Obtain necessary fall risk management equipment:  - Apply yellow socks and bracelet for high fall risk patients  - Consider moving patient to room near nurses station  Outcome: Progressing     Problem: Prexisting or High Potential for Compromised Skin Integrity  Goal: Skin integrity is maintained or improved  Description: INTERVENTIONS:  - Identify patients at risk for skin breakdown  - Assess and monitor skin integrity  - Assess and monitor nutrition and hydration status  - Monitor labs   - Assess for incontinence   - Turn and reposition patient  - Assist with mobility/ambulation  - Relieve pressure over bony prominences  - Avoid friction and shearing  - Provide appropriate hygiene as needed including keeping skin clean and dry  - Evaluate need for skin moisturizer/barrier cream  - Collaborate with interdisciplinary team   - Patient/family teaching  - Consider wound care consult   Outcome: Progressing

## 2021-09-23 ENCOUNTER — TRANSITIONAL CARE MANAGEMENT (OUTPATIENT)
Dept: FAMILY MEDICINE CLINIC | Facility: HOME HEALTHCARE | Age: 56
End: 2021-09-23

## 2021-09-23 LAB
DME PARACHUTE DELIVERY DATE ACTUAL: NORMAL
DME PARACHUTE DELIVERY DATE ACTUAL: NORMAL
DME PARACHUTE DELIVERY DATE EXPECTED: NORMAL
DME PARACHUTE DELIVERY DATE EXPECTED: NORMAL
DME PARACHUTE DELIVERY DATE REQUESTED: NORMAL
DME PARACHUTE DELIVERY DATE REQUESTED: NORMAL
DME PARACHUTE ITEM DESCRIPTION: NORMAL
DME PARACHUTE ORDER STATUS: NORMAL
DME PARACHUTE ORDER STATUS: NORMAL
DME PARACHUTE SUPPLIER NAME: NORMAL
DME PARACHUTE SUPPLIER NAME: NORMAL
DME PARACHUTE SUPPLIER PHONE: NORMAL
DME PARACHUTE SUPPLIER PHONE: NORMAL

## 2021-09-27 ENCOUNTER — OFFICE VISIT (OUTPATIENT)
Dept: FAMILY MEDICINE CLINIC | Facility: HOME HEALTHCARE | Age: 56
End: 2021-09-27
Payer: MEDICARE

## 2021-09-27 VITALS
HEIGHT: 72 IN | HEART RATE: 100 BPM | WEIGHT: 164 LBS | DIASTOLIC BLOOD PRESSURE: 44 MMHG | BODY MASS INDEX: 22.21 KG/M2 | SYSTOLIC BLOOD PRESSURE: 134 MMHG | RESPIRATION RATE: 18 BRPM

## 2021-09-27 DIAGNOSIS — Z76.89 ENCOUNTER FOR SUPPORT AND COORDINATION OF TRANSITION OF CARE: Primary | ICD-10-CM

## 2021-09-27 DIAGNOSIS — I10 ESSENTIAL HYPERTENSION: Chronic | ICD-10-CM

## 2021-09-27 DIAGNOSIS — R32 URINARY INCONTINENCE, UNSPECIFIED TYPE: ICD-10-CM

## 2021-09-27 DIAGNOSIS — R78.81 BACTEREMIA DUE TO GRAM-POSITIVE BACTERIA: ICD-10-CM

## 2021-09-27 DIAGNOSIS — G93.41 METABOLIC ENCEPHALOPATHY: ICD-10-CM

## 2021-09-27 DIAGNOSIS — L02.31 GLUTEAL ABSCESS: ICD-10-CM

## 2021-09-27 DIAGNOSIS — E03.9 ACQUIRED HYPOTHYROIDISM: ICD-10-CM

## 2021-09-27 DIAGNOSIS — R32 URINARY INCONTINENCE, UNSPECIFIED TYPE: Primary | ICD-10-CM

## 2021-09-27 DIAGNOSIS — E83.42 HYPOMAGNESEMIA: ICD-10-CM

## 2021-09-27 DIAGNOSIS — E11.9 TYPE 2 DIABETES MELLITUS WITHOUT COMPLICATION, WITHOUT LONG-TERM CURRENT USE OF INSULIN (HCC): Chronic | ICD-10-CM

## 2021-09-27 DIAGNOSIS — G40.309 GENERALIZED CONVULSIVE EPILEPSY (HCC): Chronic | ICD-10-CM

## 2021-09-27 DIAGNOSIS — G83.89 CEREBRAL PARESIS WITH HOMOLATERAL ATAXIA (HCC): ICD-10-CM

## 2021-09-27 PROCEDURE — 99496 TRANSJ CARE MGMT HIGH F2F 7D: CPT | Performed by: FAMILY MEDICINE

## 2021-09-27 NOTE — PROGRESS NOTES
[facility]       NAME: Rufino Jiang is a 49 y o  male  : 1965    MRN: [id number]  DATE: 2021  TIME: 10:40 AM    Assessment and Plan   Diagnoses and all orders for this visit:    Urinary incontinence, unspecified type  -     Durable Medical Equipment    Encounter for support and coordination of transition of care    Type 2 diabetes mellitus without complication, without long-term current use of insulin ([facility] )    Acquired hypothyroidism    Essential hypertension    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[facility] )    Generalized convulsive epilepsy ([facility] )    Metabolic encephalopathy    Bacteremia due to Gram-positive bacteria    Gluteal abscess    Hypomagnesemia        No problem-specific Assessment & Plan notes found for this encounter  Patient Instructions     Patient return in 3-4 weeks for recheck  Patient's caretaker will call wound management today to schedule appointment  Caretaker instructed to call me with any questions or concerns  Chief Complaint   No chief complaint on file  Hospital follow-up      History of Present Illness       HPI   61-year-old male here today with his healthcare aide  Patient here for transition of medical care  Patient recently hospitalized for sepsis secondary to left gluteal abscess, bacteremia due to gram-positive bacteria, hypertension, generalized convulsive epilepsy, acquired hypo thyroidism, hyperlipidemia, metabolic encephalopathy, hyponatremia, hypo magnesium, type 2 diabetes and vitamin-D deficiency  Patient was discharged on 2021  Patient had pulled out his PICC line PICC line was successfully replaced on  PICC line removed on the  after completion of antibiotic course  Patient was evaluated by Infectious Disease  And deemed stable for discharge  Patient completed course of IV vancomycin and currently with wound VAC in place    Caretaker states will  Call Wound Care today to make a follow-up appointment  Patient had to be seen down in physical therapy office today due to are elevator not working  Will have patient come back in next 3-4 weeks for re-evaluation  Review of Systems   Review of Systems   Constitutional: Negative  HENT: Negative  Respiratory: Negative  Cardiovascular: Negative  Gastrointestinal: Negative  Genitourinary: Negative for decreased urine volume  Neurological: Negative for dizziness and headaches  Hematological: Negative for adenopathy  Psychiatric/Behavioral: Negative for self-injury and suicidal ideas  All other systems reviewed and are negative  Current Medications       Current Outpatient Medications:     benzonatate (TESSALON) 200 MG capsule, Take 1 capsule (200 mg total) by mouth 3 (three) times a day as needed for cough, Disp: 20 capsule, Rfl: 0    benztropine (COGENTIN) 0 5 mg tablet, TAKE 1 TABLET BY MOUTH TWICE A DAY (8AM,8PM), Disp: 56 tablet, Rfl: 4    Blood Glucose Monitoring Suppl (ONETOUCH VERIO) w/Device KIT, by Does not apply route 3 (three) times a day TEST BLOOD SUGARS THREE TIMES, Disp: 1 kit, Rfl: 0    calcium carbonate (OYSTER SHELL,OSCAL) 500 mg, TAKE 1 TABLET BY MOUTH 3 TIMES A DAY (8AM,2PM,8PM), Disp: 84 tablet, Rfl: 4    cholecalciferol (VITAMIN D3) 1,000 units tablet, TAKE 1 TABLET BY MOUTH DAILY (8AM) (DX: VITAMIN DAILY DEFICIENCY), Disp: 28 tablet, Rfl: 4    clonazePAM (KlonoPIN) 0 25 MG disintegrating tablet, Take 1 tablet (0 25 mg total) by mouth as needed (for clusters of myoclonus   can repeat once after 1 hour if myoclonus continues ), Disp: 10 tablet, Rfl: 1    Depakote  MG 24 hr tablet, TAKE 1 TABLET BY MOUTH IN THE MORNING *BRAND NECESSARY*;TAKE 2 TABLETS BY MOUTH AT BEDTIME, Disp: 90 tablet, Rfl: 11    gabapentin (NEURONTIN) 100 mg capsule, Take 100 mg by mouth 3 (three) times a day , Disp: , Rfl:     glucose blood (OneTouch Verio) test strip, TEST BLOOD SUGARS THREE TIMES DAILY, Disp: 100 each, Rfl: 5    Humidifiers (Cool Mist Humidifier 1 gallon) MISC, Use daily at bedtime, Disp: 1 each, Rfl: 0    hydrOXYzine pamoate (VISTARIL) 50 mg capsule, TAKE 1 CAPSULE BY MOUTH 3 TIMES A DAY, Disp: 84 capsule, Rfl: 10    Insulin Pen Needle (PEN NEEDLES) 31G X 5 MM MISC, by Does not apply route daily, Disp: 100 each, Rfl: 5    levETIRAcetam (KEPPRA) 750 mg tablet, Take 2 tablets (1,500 mg total) by mouth 2 (two) times a day, Disp: 120 tablet, Rfl: 11    levothyroxine 125 mcg tablet, TAKE 1 TABLET BY MOUTH DAILY (8AM) (DX: OTHER SPECIFIED HYPOTHYROIDISM), Disp: 28 tablet, Rfl: 4    lisinopril (ZESTRIL) 10 mg tablet, TAKE 1 TABLET BY MOUTH DAILY (8AM) (DX: HYPERTENSION), Disp: 28 tablet, Rfl: 4    loratadine (CLARITIN) 10 mg tablet, One tab daily as needed for allergy symptoms, Disp: 90 tablet, Rfl: 0    metFORMIN (GLUCOPHAGE) 1000 MG tablet, TAKE 1 TABLET BY MOUTH TWICE A DAY WITH MEALS (8AM, 8PM) (DX: DIABETES MELLITUS), Disp: 56 tablet, Rfl: 4    Multiple Vitamin (Daily-Reina) TABS, TAKE 1 TABLET BY MOUTH DAILY (8AM), Disp: 28 tablet, Rfl: 4    OneTouch Delica Lancets 16V MISC, by Does not apply route daily TEST BLOOD SUGARS THREE TIMES DAILY, Disp: 100 each, Rfl: 2    polyethylene glycol (MIRALAX) 17 g packet, Take 17 g by mouth daily, Disp: 14 each, Rfl: 8    QUEtiapine (SEROquel XR) 200 mg 24 hr tablet, TAKE 1 TABLET BY MOUTH TWICE A DAY (8AM,2PM), Disp: 16 tablet, Rfl: 10    QUEtiapine (SEROquel XR) 400 mg 24 hr tablet, TAKE 1 TABLET BY MOUTH AT BEDTIME (8PM) (DX: ANTIPSYCHOTIC), Disp: 28 tablet, Rfl: 10    simvastatin (ZOCOR) 40 mg tablet, TAKE 1 TABLET BY MOUTH DAILY (DX: HYPERLIPIDEMIA), Disp: 28 tablet, Rfl: 4    sodium chloride (OCEAN) 0 65 % nasal spray, 1 spray into each nostril as needed for congestion, Disp: 15 mL, Rfl: 1    Stool Softener 100 MG capsule, TAKE 1 CAPSULE BY MOUTH TWICE A DAY (8AM,8PM) (DX: CONSTIPATION), Disp: 56 capsule, Rfl: 4    temazepam (RESTORIL) 15 mg capsule, Take 15 mg by mouth daily at bedtime as needed for sleep , Disp: , Rfl:     temazepam (RESTORIL) 7 5 mg capsule, Take 15 mg by mouth daily at bedtime as needed for sleep, Disp: , Rfl:     triamcinolone (KENALOG) 0 1 % cream, Apply topically 2 (two) times a day, Disp: 30 g, Rfl: 0    Vimpat 200 MG tablet, TAKE 1 TABLET BY MOUTH EVERY 12 HOURS (DX: EPILEPSY), Disp: 60 tablet, Rfl: 2    vitamin B-12 (VITAMIN B-12) 1,000 mcg tablet, TAKE 1 TABLET BY MOUTH EVERY OTHER DAY (8AM) (DX: DEFICIENCY OF OTHER SPECIFIED B GROUP VITAMINS), Disp: 14 tablet, Rfl: 4    Current Allergies     Allergies as of 09/27/2021 - Reviewed 09/27/2021   Allergen Reaction Noted    Erythromycin Other (See Comments) 11/22/2013    Penicillins Other (See Comments) 11/22/2013            The following portions of the patient's history were reviewed and updated as appropriate: allergies, current medications, past family history, past medical history, past social history, past surgical history and problem list      Past Medical History:   Diagnosis Date    ADRIANA [facility] kidney [facility]) ([facility] ) 9/24/2019    CP (cerebral palsy) ([facility] )     Depression     Diabetes ([facility] )     Disease of thyroid gland     Gait disorder     Hyperlipidemia     Hypertension     Kidney failure     Kidney stones     Osteoporosis     Psychiatric disorder     Scoliosis     Seizures ([facility] )     Thyroid disease     UTI (urinary tract infection)        Past Surgical History:   Procedure Laterality Date    APPENDECTOMY      IR PICC PLACEMENT SINGLE LUMEN  9/13/2021    IR PICC PLACEMENT SINGLE LUMEN  9/16/2021       History reviewed  No pertinent family history  Medications have been verified  Objective   BP (!) 134/44   Pulse 100   Resp 18   Ht 6' (1 829 m)   Wt 74 4 kg (164 lb)   BMI 22 24 kg/m²        Physical Exam     Physical Exam  Vitals reviewed  Constitutional:       General: He is not in acute distress       Appearance: He is not ill-appearing, toxic-appearing or diaphoretic  HENT:      Head: Normocephalic  Mouth/Throat:      Mouth: Mucous membranes are moist       Pharynx: Oropharynx is clear  Eyes:      General: No scleral icterus  Conjunctiva/sclera: Conjunctivae normal    Cardiovascular:      Rate and Rhythm: Normal rate and regular rhythm  Pulmonary:      Effort: Pulmonary effort is normal  No respiratory distress  Breath sounds: Normal breath sounds  Abdominal:      General: Bowel sounds are normal       Tenderness: There is no abdominal tenderness  Musculoskeletal:      Cervical back: Neck supple  Right lower leg: No edema  Left lower leg: No edema  Comments: Left gluteal wound VAC  Mild erythema left buttock/gluteal region  Lymphadenopathy:      Cervical: No cervical adenopathy  Neurological:      Mental Status: He is alert  Gait: Abnormal gait:  wheelchair     Psychiatric:         Mood and Affect: Mood normal

## 2021-10-04 ENCOUNTER — OFFICE VISIT (OUTPATIENT)
Dept: SURGERY | Facility: CLINIC | Age: 56
End: 2021-10-04
Payer: MEDICARE

## 2021-10-04 VITALS
DIASTOLIC BLOOD PRESSURE: 68 MMHG | TEMPERATURE: 97.1 F | SYSTOLIC BLOOD PRESSURE: 136 MMHG | BODY MASS INDEX: 22.24 KG/M2 | HEART RATE: 92 BPM | RESPIRATION RATE: 20 BRPM | HEIGHT: 72 IN

## 2021-10-04 DIAGNOSIS — Z51.89 ENCOUNTER FOR WOUND CARE: ICD-10-CM

## 2021-10-04 DIAGNOSIS — L02.31 GLUTEAL ABSCESS: Primary | ICD-10-CM

## 2021-10-04 DIAGNOSIS — K59.00 CONSTIPATION, UNSPECIFIED CONSTIPATION TYPE: ICD-10-CM

## 2021-10-04 DIAGNOSIS — E08.00 DIABETES MELLITUS DUE TO UNDERLYING CONDITION WITH HYPEROSMOLARITY WITHOUT COMA, WITHOUT LONG-TERM CURRENT USE OF INSULIN (HCC): ICD-10-CM

## 2021-10-04 PROCEDURE — 97605 NEG PRS WND THER DME<=50SQCM: CPT | Performed by: SURGERY

## 2021-10-04 PROCEDURE — 99213 OFFICE O/P EST LOW 20 MIN: CPT | Performed by: SURGERY

## 2021-10-05 RX ORDER — DOCUSATE SODIUM 100 MG/1
CAPSULE, LIQUID FILLED ORAL
Qty: 56 CAPSULE | Refills: 4 | Status: SHIPPED | OUTPATIENT
Start: 2021-10-05

## 2021-10-18 ENCOUNTER — TELEPHONE (OUTPATIENT)
Dept: SURGERY | Facility: CLINIC | Age: 56
End: 2021-10-18

## 2021-10-27 ENCOUNTER — TELEPHONE (OUTPATIENT)
Dept: FAMILY MEDICINE CLINIC | Facility: HOME HEALTHCARE | Age: 56
End: 2021-10-27

## 2021-10-29 ENCOUNTER — TELEPHONE (OUTPATIENT)
Dept: SURGERY | Facility: CLINIC | Age: 56
End: 2021-10-29

## 2021-11-03 ENCOUNTER — TELEPHONE (OUTPATIENT)
Dept: NEUROLOGY | Facility: CLINIC | Age: 56
End: 2021-11-03

## 2021-11-03 ENCOUNTER — OFFICE VISIT (OUTPATIENT)
Dept: FAMILY MEDICINE CLINIC | Facility: CLINIC | Age: 56
End: 2021-11-03
Payer: MEDICARE

## 2021-11-03 VITALS
HEART RATE: 80 BPM | WEIGHT: 136 LBS | HEIGHT: 72 IN | DIASTOLIC BLOOD PRESSURE: 72 MMHG | SYSTOLIC BLOOD PRESSURE: 136 MMHG | BODY MASS INDEX: 18.42 KG/M2 | TEMPERATURE: 96 F

## 2021-11-03 DIAGNOSIS — Z23 NEED FOR INFLUENZA VACCINATION: ICD-10-CM

## 2021-11-03 DIAGNOSIS — E03.9 ACQUIRED HYPOTHYROIDISM: ICD-10-CM

## 2021-11-03 DIAGNOSIS — D69.1 THROMBOCYTOPATHIA (HCC): ICD-10-CM

## 2021-11-03 DIAGNOSIS — I10 ESSENTIAL HYPERTENSION: Chronic | ICD-10-CM

## 2021-11-03 DIAGNOSIS — E78.49 OTHER HYPERLIPIDEMIA: ICD-10-CM

## 2021-11-03 DIAGNOSIS — Z12.11 SCREENING FOR COLON CANCER: ICD-10-CM

## 2021-11-03 DIAGNOSIS — G40.309 GENERALIZED CONVULSIVE EPILEPSY (HCC): ICD-10-CM

## 2021-11-03 DIAGNOSIS — Z12.5 SCREENING FOR MALIGNANT NEOPLASM OF PROSTATE: ICD-10-CM

## 2021-11-03 DIAGNOSIS — E08.00 DIABETES MELLITUS DUE TO UNDERLYING CONDITION WITH HYPEROSMOLARITY WITHOUT COMA, WITHOUT LONG-TERM CURRENT USE OF INSULIN (HCC): ICD-10-CM

## 2021-11-03 DIAGNOSIS — Z11.4 SCREENING FOR HIV (HUMAN IMMUNODEFICIENCY VIRUS): ICD-10-CM

## 2021-11-03 DIAGNOSIS — Z13.0 SCREENING FOR DEFICIENCY ANEMIA: ICD-10-CM

## 2021-11-03 DIAGNOSIS — Z11.59 ENCOUNTER FOR HEPATITIS C SCREENING TEST FOR LOW RISK PATIENT: ICD-10-CM

## 2021-11-03 DIAGNOSIS — Z13.220 SCREENING FOR HYPERLIPIDEMIA: ICD-10-CM

## 2021-11-03 DIAGNOSIS — F51.01 PRIMARY INSOMNIA: ICD-10-CM

## 2021-11-03 DIAGNOSIS — Z76.89 ENCOUNTER TO ESTABLISH CARE WITH NEW DOCTOR: Primary | ICD-10-CM

## 2021-11-03 DIAGNOSIS — F69 BEHAVIOR CONCERN IN ADULT: ICD-10-CM

## 2021-11-03 DIAGNOSIS — E11.9 TYPE 2 DIABETES MELLITUS WITHOUT COMPLICATION, WITHOUT LONG-TERM CURRENT USE OF INSULIN (HCC): ICD-10-CM

## 2021-11-03 LAB — SL AMB POCT HEMOGLOBIN AIC: 5.2 (ref ?–6.5)

## 2021-11-03 PROCEDURE — G0008 ADMIN INFLUENZA VIRUS VAC: HCPCS

## 2021-11-03 PROCEDURE — 83036 HEMOGLOBIN GLYCOSYLATED A1C: CPT | Performed by: NURSE PRACTITIONER

## 2021-11-03 PROCEDURE — 90682 RIV4 VACC RECOMBINANT DNA IM: CPT

## 2021-11-03 PROCEDURE — 99215 OFFICE O/P EST HI 40 MIN: CPT | Performed by: NURSE PRACTITIONER

## 2021-11-03 RX ORDER — QUETIAPINE 400 MG/1
TABLET, FILM COATED, EXTENDED RELEASE ORAL
Qty: 90 TABLET | Refills: 1 | Status: SHIPPED | OUTPATIENT
Start: 2021-11-03 | End: 2022-03-28 | Stop reason: SDUPTHER

## 2021-11-03 RX ORDER — TEMAZEPAM 15 MG/1
15 CAPSULE ORAL
Qty: 30 CAPSULE | Refills: 1 | Status: SHIPPED | OUTPATIENT
Start: 2021-11-03 | End: 2022-03-28 | Stop reason: SDUPTHER

## 2021-11-04 ENCOUNTER — OFFICE VISIT (OUTPATIENT)
Dept: SURGERY | Facility: CLINIC | Age: 56
End: 2021-11-04
Payer: MEDICARE

## 2021-11-04 VITALS
BODY MASS INDEX: 18.42 KG/M2 | HEART RATE: 86 BPM | WEIGHT: 136 LBS | HEIGHT: 72 IN | RESPIRATION RATE: 16 BRPM | DIASTOLIC BLOOD PRESSURE: 72 MMHG | TEMPERATURE: 97.1 F | SYSTOLIC BLOOD PRESSURE: 120 MMHG

## 2021-11-04 DIAGNOSIS — S31.829D BUTTOCK WOUND, LEFT, SUBSEQUENT ENCOUNTER: ICD-10-CM

## 2021-11-04 DIAGNOSIS — Z51.89 ENCOUNTER FOR WOUND CARE: Primary | ICD-10-CM

## 2021-11-04 PROCEDURE — 99212 OFFICE O/P EST SF 10 MIN: CPT | Performed by: SURGERY

## 2021-11-05 PROBLEM — S31.829D BUTTOCK WOUND, LEFT, SUBSEQUENT ENCOUNTER: Status: ACTIVE | Noted: 2021-11-05

## 2021-11-05 PROBLEM — A41.9 SEPSIS (HCC): Status: RESOLVED | Noted: 2019-09-25 | Resolved: 2021-11-05

## 2021-11-05 PROBLEM — R78.81 BACTEREMIA DUE TO GRAM-POSITIVE BACTERIA: Status: RESOLVED | Noted: 2021-09-07 | Resolved: 2021-11-05

## 2021-11-05 PROBLEM — L02.31 GLUTEAL ABSCESS: Status: RESOLVED | Noted: 2021-09-06 | Resolved: 2021-11-05

## 2021-11-08 ENCOUNTER — TELEPHONE (OUTPATIENT)
Dept: FAMILY MEDICINE CLINIC | Facility: HOME HEALTHCARE | Age: 56
End: 2021-11-08

## 2021-11-11 ENCOUNTER — TELEPHONE (OUTPATIENT)
Dept: NEUROLOGY | Facility: CLINIC | Age: 56
End: 2021-11-11

## 2021-11-23 ENCOUNTER — HOSPITAL ENCOUNTER (EMERGENCY)
Facility: HOSPITAL | Age: 56
Discharge: HOME | End: 2021-11-24
Attending: EMERGENCY MEDICINE | Admitting: EMERGENCY MEDICINE
Payer: MEDICARE

## 2021-11-23 ENCOUNTER — APPOINTMENT (EMERGENCY)
Dept: RADIOLOGY | Facility: HOSPITAL | Age: 56
End: 2021-11-23
Payer: MEDICARE

## 2021-11-23 ENCOUNTER — HOSPITAL ENCOUNTER (EMERGENCY)
Facility: HOSPITAL | Age: 56
Discharge: HOME | End: 2021-11-23
Attending: EMERGENCY MEDICINE | Admitting: EMERGENCY MEDICINE
Payer: MEDICARE

## 2021-11-23 VITALS
SYSTOLIC BLOOD PRESSURE: 121 MMHG | WEIGHT: 136 LBS | OXYGEN SATURATION: 99 % | DIASTOLIC BLOOD PRESSURE: 64 MMHG | HEART RATE: 79 BPM | BODY MASS INDEX: 18.42 KG/M2 | RESPIRATION RATE: 18 BRPM | TEMPERATURE: 97.5 F | HEIGHT: 72 IN

## 2021-11-23 VITALS
TEMPERATURE: 95.8 F | OXYGEN SATURATION: 98 % | DIASTOLIC BLOOD PRESSURE: 70 MMHG | BODY MASS INDEX: 17.88 KG/M2 | RESPIRATION RATE: 18 BRPM | WEIGHT: 132 LBS | HEART RATE: 79 BPM | SYSTOLIC BLOOD PRESSURE: 144 MMHG | HEIGHT: 72 IN

## 2021-11-23 DIAGNOSIS — S52.515A CLOSED NONDISPLACED FRACTURE OF STYLOID PROCESS OF LEFT RADIUS, INITIAL ENCOUNTER: Primary | ICD-10-CM

## 2021-11-23 DIAGNOSIS — S89.91XA INJURY OF RIGHT KNEE, INITIAL ENCOUNTER: Primary | ICD-10-CM

## 2021-11-23 PROCEDURE — 73564 X-RAY EXAM KNEE 4 OR MORE: CPT

## 2021-11-23 PROCEDURE — 73110 X-RAY EXAM OF WRIST: CPT

## 2021-11-23 PROCEDURE — 99284 EMERGENCY DEPT VISIT MOD MDM: CPT

## 2021-11-23 PROCEDURE — 99284 EMERGENCY DEPT VISIT MOD MDM: CPT | Performed by: EMERGENCY MEDICINE

## 2021-11-23 PROCEDURE — 73130 X-RAY EXAM OF HAND: CPT

## 2021-11-23 PROCEDURE — 99283 EMERGENCY DEPT VISIT LOW MDM: CPT

## 2021-11-24 PROCEDURE — 99284 EMERGENCY DEPT VISIT MOD MDM: CPT | Performed by: EMERGENCY MEDICINE

## 2021-11-29 ENCOUNTER — TELEPHONE (OUTPATIENT)
Dept: SURGERY | Facility: CLINIC | Age: 56
End: 2021-11-29

## 2021-12-01 ENCOUNTER — OFFICE VISIT (OUTPATIENT)
Dept: OBGYN CLINIC | Facility: CLINIC | Age: 56
End: 2021-12-01
Payer: MEDICARE

## 2021-12-01 VITALS
HEIGHT: 72 IN | BODY MASS INDEX: 17.88 KG/M2 | DIASTOLIC BLOOD PRESSURE: 80 MMHG | SYSTOLIC BLOOD PRESSURE: 137 MMHG | WEIGHT: 132 LBS | HEART RATE: 116 BPM

## 2021-12-01 DIAGNOSIS — S52.515A CLOSED NONDISPLACED FRACTURE OF STYLOID PROCESS OF LEFT RADIUS, INITIAL ENCOUNTER: Primary | ICD-10-CM

## 2021-12-01 PROCEDURE — 99203 OFFICE O/P NEW LOW 30 MIN: CPT | Performed by: ORTHOPAEDIC SURGERY

## 2021-12-02 DIAGNOSIS — R25.9 INVOLUNTARY MOVEMENTS: ICD-10-CM

## 2021-12-03 RX ORDER — LEVETIRACETAM 750 MG/1
TABLET ORAL
Qty: 112 TABLET | Refills: 10 | Status: SHIPPED | OUTPATIENT
Start: 2021-12-03 | End: 2022-03-29 | Stop reason: SDUPTHER

## 2021-12-08 ENCOUNTER — TELEPHONE (OUTPATIENT)
Dept: FAMILY MEDICINE CLINIC | Facility: CLINIC | Age: 56
End: 2021-12-08

## 2021-12-09 ENCOUNTER — OFFICE VISIT (OUTPATIENT)
Dept: SURGERY | Facility: CLINIC | Age: 56
End: 2021-12-09
Payer: MEDICARE

## 2021-12-09 VITALS
HEART RATE: 80 BPM | RESPIRATION RATE: 16 BRPM | HEIGHT: 72 IN | WEIGHT: 146.6 LBS | BODY MASS INDEX: 19.86 KG/M2 | SYSTOLIC BLOOD PRESSURE: 140 MMHG | DIASTOLIC BLOOD PRESSURE: 72 MMHG | TEMPERATURE: 98.1 F

## 2021-12-09 DIAGNOSIS — S31.829D BUTTOCK WOUND, LEFT, SUBSEQUENT ENCOUNTER: Primary | ICD-10-CM

## 2021-12-09 PROCEDURE — 99212 OFFICE O/P EST SF 10 MIN: CPT | Performed by: SURGERY

## 2021-12-14 ENCOUNTER — TELEPHONE (OUTPATIENT)
Dept: SURGERY | Facility: CLINIC | Age: 56
End: 2021-12-14

## 2021-12-17 DIAGNOSIS — E78.5 HYPERLIPIDEMIA, UNSPECIFIED HYPERLIPIDEMIA TYPE: ICD-10-CM

## 2021-12-17 DIAGNOSIS — I10 HYPERTENSION, UNSPECIFIED TYPE: ICD-10-CM

## 2021-12-17 DIAGNOSIS — E06.3 HYPOTHYROIDISM DUE TO HASHIMOTO'S THYROIDITIS: ICD-10-CM

## 2021-12-17 DIAGNOSIS — R56.9 SEIZURE (HCC): ICD-10-CM

## 2021-12-17 DIAGNOSIS — G62.9 NEUROPATHY: ICD-10-CM

## 2021-12-17 DIAGNOSIS — E08.00 DIABETES MELLITUS DUE TO UNDERLYING CONDITION WITH HYPEROSMOLARITY WITHOUT COMA, WITHOUT LONG-TERM CURRENT USE OF INSULIN (HCC): ICD-10-CM

## 2021-12-17 DIAGNOSIS — E03.8 HYPOTHYROIDISM DUE TO HASHIMOTO'S THYROIDITIS: ICD-10-CM

## 2021-12-17 RX ORDER — CALCIUM CARBONATE 500(1250)
1 TABLET ORAL DAILY
Qty: 90 TABLET | Refills: 1 | Status: SHIPPED | OUTPATIENT
Start: 2021-12-17 | End: 2022-03-09 | Stop reason: SDUPTHER

## 2021-12-17 RX ORDER — LEVOTHYROXINE SODIUM 0.12 MG/1
125 TABLET ORAL DAILY
Qty: 90 TABLET | Refills: 1 | Status: SHIPPED | OUTPATIENT
Start: 2021-12-17 | End: 2022-03-28 | Stop reason: SDUPTHER

## 2021-12-17 RX ORDER — MULTIVITAMIN WITH FOLIC ACID 400 MCG
1 TABLET ORAL DAILY
Qty: 90 TABLET | Refills: 1 | Status: SHIPPED | OUTPATIENT
Start: 2021-12-17 | End: 2022-03-28 | Stop reason: SDUPTHER

## 2021-12-17 RX ORDER — SIMVASTATIN 40 MG
40 TABLET ORAL DAILY
Qty: 90 TABLET | Refills: 1 | Status: SHIPPED | OUTPATIENT
Start: 2021-12-17 | End: 2022-03-09 | Stop reason: SDUPTHER

## 2021-12-20 ENCOUNTER — HOSPITAL ENCOUNTER (INPATIENT)
Facility: HOSPITAL | Age: 56
LOS: 2 days | Discharge: HOME HEALTH | DRG: 871 | End: 2021-12-23
Attending: EMERGENCY MEDICINE | Admitting: INTERNAL MEDICINE
Payer: MEDICARE

## 2021-12-20 ENCOUNTER — APPOINTMENT (EMERGENCY)
Dept: CT IMAGING | Facility: HOSPITAL | Age: 56
DRG: 871 | End: 2021-12-20
Payer: MEDICARE

## 2021-12-20 DIAGNOSIS — A41.9 SEVERE SEPSIS (HCC): ICD-10-CM

## 2021-12-20 DIAGNOSIS — R65.20 SEVERE SEPSIS (HCC): ICD-10-CM

## 2021-12-20 DIAGNOSIS — N39.0 UTI (URINARY TRACT INFECTION): ICD-10-CM

## 2021-12-20 DIAGNOSIS — G45.9 TIA (TRANSIENT ISCHEMIC ATTACK): Primary | ICD-10-CM

## 2021-12-20 DIAGNOSIS — G80.9 CEREBRAL PALSY (HCC): ICD-10-CM

## 2021-12-20 DIAGNOSIS — S00.81XA ABRASION, FACE W/O INFECTION: ICD-10-CM
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
## 2021-12-20 DIAGNOSIS — G83.89 CEREBRAL PARESIS WITH HOMOLATERAL ATAXIA (HCC): ICD-10-CM

## 2021-12-20 DIAGNOSIS — R50.9 FEVER: ICD-10-CM

## 2021-12-20 PROBLEM — N30.00 ACUTE CYSTITIS: Status: ACTIVE | Noted: 2019-09-24

## 2021-12-20 LAB
2HR DELTA HS TROPONIN: 2 NG/L
4HR DELTA HS TROPONIN: 5 NG/L
ANION GAP SERPL CALCULATED.3IONS-SCNC: 7 MMOL/L (ref 4–13)
APTT PPP: 40 SECONDS (ref 23–37)
BACTERIA UR QL AUTO: ABNORMAL /HPF
BILIRUB UR QL STRIP: NEGATIVE
BUN SERPL-MCNC: 30 MG/DL (ref 5–25)
CALCIUM SERPL-MCNC: 8.9 MG/DL (ref 8.4–10.2)
CARDIAC TROPONIN I PNL SERPL HS: 11 NG/L
CARDIAC TROPONIN I PNL SERPL HS: 14 NG/L
CARDIAC TROPONIN I PNL SERPL HS: 9 NG/L
CHLORIDE SERPL-SCNC: 98 MMOL/L (ref 96–108)
CLARITY UR: ABNORMAL
CO2 SERPL-SCNC: 31 MMOL/L (ref 21–32)
COLOR UR: YELLOW
CREAT SERPL-MCNC: 0.84 MG/DL (ref 0.6–1.3)
ERYTHROCYTE [DISTWIDTH] IN BLOOD BY AUTOMATED COUNT: 14.5 % (ref 11.6–15.1)
FLUAV RNA RESP QL NAA+PROBE: NEGATIVE
FLUBV RNA RESP QL NAA+PROBE: NEGATIVE
GFR SERPL CREATININE-BSD FRML MDRD: 99 ML/MIN/1.73SQ M
GLUCOSE SERPL-MCNC: 156 MG/DL (ref 65–140)
GLUCOSE SERPL-MCNC: 173 MG/DL (ref 65–140)
GLUCOSE SERPL-MCNC: 177 MG/DL (ref 65–140)
GLUCOSE SERPL-MCNC: 194 MG/DL (ref 65–140)
GLUCOSE SERPL-MCNC: 263 MG/DL (ref 65–140)
GLUCOSE UR STRIP-MCNC: NEGATIVE MG/DL
HCT VFR BLD AUTO: 30.7 % (ref 36.5–49.3)
HGB BLD-MCNC: 10.1 G/DL (ref 12–17)
HGB UR QL STRIP.AUTO: ABNORMAL
INR PPP: 1.15 (ref 0.84–1.19)
KETONES UR STRIP-MCNC: NEGATIVE MG/DL
LACTATE SERPL-SCNC: 1.7 MMOL/L (ref 0.5–2)
LACTATE SERPL-SCNC: 2.8 MMOL/L (ref 0.5–2)
LEUKOCYTE ESTERASE UR QL STRIP: ABNORMAL
MCH RBC QN AUTO: 30.8 PG (ref 26.8–34.3)
MCHC RBC AUTO-ENTMCNC: 32.9 G/DL (ref 31.4–37.4)
MCV RBC AUTO: 94 FL (ref 82–98)
NITRITE UR QL STRIP: NEGATIVE
NON-SQ EPI CELLS URNS QL MICRO: ABNORMAL /HPF
PH UR STRIP.AUTO: 6 [PH]
PLATELET # BLD AUTO: 122 THOUSANDS/UL (ref 149–390)
PMV BLD AUTO: 11.1 FL (ref 8.9–12.7)
POTASSIUM SERPL-SCNC: 3.6 MMOL/L (ref 3.5–5.3)
PROCALCITONIN SERPL-MCNC: 1.83 NG/ML
PROT UR STRIP-MCNC: ABNORMAL MG/DL
PROTHROMBIN TIME: 14.5 SECONDS (ref 11.6–14.5)
RBC # BLD AUTO: 3.28 MILLION/UL (ref 3.88–5.62)
RBC #/AREA URNS AUTO: ABNORMAL /HPF
RSV RNA RESP QL NAA+PROBE: NEGATIVE
SARS-COV-2 RNA RESP QL NAA+PROBE: NEGATIVE
SODIUM SERPL-SCNC: 136 MMOL/L (ref 135–147)
SP GR UR STRIP.AUTO: 1.01 (ref 1–1.03)
UROBILINOGEN UR QL STRIP.AUTO: 0.2 E.U./DL
VALPROATE SERPL-MCNC: 62 UG/ML (ref 50–100)
WBC # BLD AUTO: 7.82 THOUSAND/UL (ref 4.31–10.16)
WBC #/AREA URNS AUTO: ABNORMAL /HPF

## 2021-12-20 PROCEDURE — 87040 BLOOD CULTURE FOR BACTERIA: CPT | Performed by: NURSE PRACTITIONER

## 2021-12-20 PROCEDURE — 84145 PROCALCITONIN (PCT): CPT | Performed by: NURSE PRACTITIONER

## 2021-12-20 PROCEDURE — 81001 URINALYSIS AUTO W/SCOPE: CPT

## 2021-12-20 PROCEDURE — 0241U HB NFCT DS VIR RESP RNA 4 TRGT: CPT | Performed by: EMERGENCY MEDICINE

## 2021-12-20 PROCEDURE — G1004 CDSM NDSC: HCPCS

## 2021-12-20 PROCEDURE — 80177 DRUG SCRN QUAN LEVETIRACETAM: CPT | Performed by: PSYCHIATRY & NEUROLOGY

## 2021-12-20 PROCEDURE — 83605 ASSAY OF LACTIC ACID: CPT | Performed by: NURSE PRACTITIONER

## 2021-12-20 PROCEDURE — 94664 DEMO&/EVAL PT USE INHALER: CPT

## 2021-12-20 PROCEDURE — 93005 ELECTROCARDIOGRAM TRACING: CPT

## 2021-12-20 PROCEDURE — 80048 BASIC METABOLIC PNL TOTAL CA: CPT | Performed by: EMERGENCY MEDICINE

## 2021-12-20 PROCEDURE — 81003 URINALYSIS AUTO W/O SCOPE: CPT

## 2021-12-20 PROCEDURE — 87186 SC STD MICRODIL/AGAR DIL: CPT | Performed by: NURSE PRACTITIONER

## 2021-12-20 PROCEDURE — 72125 CT NECK SPINE W/O DYE: CPT

## 2021-12-20 PROCEDURE — 94760 N-INVAS EAR/PLS OXIMETRY 1: CPT

## 2021-12-20 PROCEDURE — 99220 PR INITIAL OBSERVATION CARE/DAY 70 MINUTES: CPT | Performed by: NURSE PRACTITIONER

## 2021-12-20 PROCEDURE — 87086 URINE CULTURE/COLONY COUNT: CPT

## 2021-12-20 PROCEDURE — 80165 DIPROPYLACETIC ACID FREE: CPT | Performed by: PSYCHIATRY & NEUROLOGY

## 2021-12-20 PROCEDURE — 82948 REAGENT STRIP/BLOOD GLUCOSE: CPT

## 2021-12-20 PROCEDURE — 80164 ASSAY DIPROPYLACETIC ACD TOT: CPT | Performed by: PSYCHIATRY & NEUROLOGY

## 2021-12-20 PROCEDURE — 80235 DRUG ASSAY LACOSAMIDE: CPT | Performed by: PSYCHIATRY & NEUROLOGY

## 2021-12-20 PROCEDURE — 99285 EMERGENCY DEPT VISIT HI MDM: CPT

## 2021-12-20 PROCEDURE — 36415 COLL VENOUS BLD VENIPUNCTURE: CPT | Performed by: EMERGENCY MEDICINE

## 2021-12-20 PROCEDURE — 84484 ASSAY OF TROPONIN QUANT: CPT | Performed by: EMERGENCY MEDICINE

## 2021-12-20 PROCEDURE — 87186 SC STD MICRODIL/AGAR DIL: CPT

## 2021-12-20 PROCEDURE — 85027 COMPLETE CBC AUTOMATED: CPT | Performed by: EMERGENCY MEDICINE

## 2021-12-20 PROCEDURE — 85610 PROTHROMBIN TIME: CPT | Performed by: EMERGENCY MEDICINE

## 2021-12-20 PROCEDURE — 70496 CT ANGIOGRAPHY HEAD: CPT

## 2021-12-20 PROCEDURE — 87077 CULTURE AEROBIC IDENTIFY: CPT | Performed by: NURSE PRACTITIONER

## 2021-12-20 PROCEDURE — 87077 CULTURE AEROBIC IDENTIFY: CPT

## 2021-12-20 PROCEDURE — 70498 CT ANGIOGRAPHY NECK: CPT

## 2021-12-20 PROCEDURE — 99285 EMERGENCY DEPT VISIT HI MDM: CPT | Performed by: EMERGENCY MEDICINE

## 2021-12-20 PROCEDURE — 85730 THROMBOPLASTIN TIME PARTIAL: CPT | Performed by: EMERGENCY MEDICINE

## 2021-12-20 PROCEDURE — 70486 CT MAXILLOFACIAL W/O DYE: CPT

## 2021-12-20 RX ORDER — BENZTROPINE MESYLATE 0.5 MG/1
0.5 TABLET ORAL 2 TIMES DAILY
Status: DISCONTINUED | OUTPATIENT
Start: 2021-12-20 | End: 2021-12-23 | Stop reason: HOSPADM

## 2021-12-20 RX ORDER — ACETAMINOPHEN 650 MG/1
650 SUPPOSITORY RECTAL ONCE
Status: COMPLETED | OUTPATIENT
Start: 2021-12-20 | End: 2021-12-20

## 2021-12-20 RX ORDER — GABAPENTIN 100 MG/1
100 CAPSULE ORAL 3 TIMES DAILY
Status: DISCONTINUED | OUTPATIENT
Start: 2021-12-20 | End: 2021-12-23 | Stop reason: HOSPADM

## 2021-12-20 RX ORDER — DIVALPROEX SODIUM 500 MG/1
1000 TABLET, EXTENDED RELEASE ORAL DAILY
Status: DISCONTINUED | OUTPATIENT
Start: 2021-12-20 | End: 2021-12-20

## 2021-12-20 RX ORDER — DIVALPROEX SODIUM 500 MG/1
1000 TABLET, EXTENDED RELEASE ORAL
Status: DISCONTINUED | OUTPATIENT
Start: 2021-12-20 | End: 2021-12-23 | Stop reason: HOSPADM

## 2021-12-20 RX ORDER — LEVETIRACETAM 500 MG/1
1500 TABLET ORAL EVERY 12 HOURS SCHEDULED
Status: DISCONTINUED | OUTPATIENT
Start: 2021-12-20 | End: 2021-12-23 | Stop reason: HOSPADM

## 2021-12-20 RX ORDER — SODIUM CHLORIDE, SODIUM GLUCONATE, SODIUM ACETATE, POTASSIUM CHLORIDE, MAGNESIUM CHLORIDE, SODIUM PHOSPHATE, DIBASIC, AND POTASSIUM PHOSPHATE .53; .5; .37; .037; .03; .012; .00082 G/100ML; G/100ML; G/100ML; G/100ML; G/100ML; G/100ML; G/100ML
100 INJECTION, SOLUTION INTRAVENOUS CONTINUOUS
Status: DISCONTINUED | OUTPATIENT
Start: 2021-12-20 | End: 2021-12-22

## 2021-12-20 RX ORDER — DIVALPROEX SODIUM 500 MG/1
500 TABLET, EXTENDED RELEASE ORAL DAILY
Status: DISCONTINUED | OUTPATIENT
Start: 2021-12-20 | End: 2021-12-23 | Stop reason: HOSPADM

## 2021-12-20 RX ORDER — LISINOPRIL 10 MG/1
10 TABLET ORAL DAILY
Status: DISCONTINUED | OUTPATIENT
Start: 2021-12-20 | End: 2021-12-23 | Stop reason: HOSPADM

## 2021-12-20 RX ORDER — CEFEPIME HYDROCHLORIDE 1 G/50ML
1000 INJECTION, SOLUTION INTRAVENOUS ONCE
Status: COMPLETED | OUTPATIENT
Start: 2021-12-20 | End: 2021-12-20

## 2021-12-20 RX ADMIN — ACETAMINOPHEN 650 MG: 650 SUPPOSITORY RECTAL at 14:52

## 2021-12-20 RX ADMIN — DIVALPROEX SODIUM 1000 MG: 500 TABLET, EXTENDED RELEASE ORAL at 21:27

## 2021-12-20 RX ADMIN — CEFEPIME HYDROCHLORIDE 1000 MG: 1 INJECTION, SOLUTION INTRAVENOUS at 14:15

## 2021-12-20 RX ADMIN — LEVETIRACETAM 1500 MG: 500 TABLET, FILM COATED ORAL at 21:01

## 2021-12-20 RX ADMIN — QUETIAPINE FUMARATE 400 MG: 50 TABLET, EXTENDED RELEASE ORAL at 21:43

## 2021-12-20 RX ADMIN — GABAPENTIN 100 MG: 100 CAPSULE ORAL at 16:36

## 2021-12-20 RX ADMIN — INSULIN LISPRO 2 UNITS: 100 INJECTION, SOLUTION INTRAVENOUS; SUBCUTANEOUS at 19:07

## 2021-12-20 RX ADMIN — GABAPENTIN 100 MG: 100 CAPSULE ORAL at 20:47

## 2021-12-20 RX ADMIN — IOHEXOL 85 ML: 350 INJECTION, SOLUTION INTRAVENOUS at 12:24

## 2021-12-20 RX ADMIN — SODIUM CHLORIDE 1000 ML: 0.9 INJECTION, SOLUTION INTRAVENOUS at 15:32

## 2021-12-20 RX ADMIN — SODIUM CHLORIDE 1000 ML: 0.9 INJECTION, SOLUTION INTRAVENOUS at 14:15

## 2021-12-20 RX ADMIN — BENZTROPINE MESYLATE 0.5 MG: 0.5 TABLET ORAL at 19:06

## 2021-12-20 RX ADMIN — SODIUM CHLORIDE, SODIUM GLUCONATE, SODIUM ACETATE, POTASSIUM CHLORIDE, MAGNESIUM CHLORIDE, SODIUM PHOSPHATE, DIBASIC, AND POTASSIUM PHOSPHATE 100 ML/HR: .53; .5; .37; .037; .03; .012; .00082 INJECTION, SOLUTION INTRAVENOUS at 15:57

## 2021-12-20 RX ADMIN — INSULIN LISPRO 1 UNITS: 100 INJECTION, SOLUTION INTRAVENOUS; SUBCUTANEOUS at 21:29

## 2021-12-20 RX ADMIN — QUETIAPINE FUMARATE 200 MG: 50 TABLET, EXTENDED RELEASE ORAL at 16:37

## 2021-12-21 ENCOUNTER — APPOINTMENT (INPATIENT)
Dept: MRI IMAGING | Facility: HOSPITAL | Age: 56
DRG: 871 | End: 2021-12-21
Payer: MEDICARE

## 2021-12-21 LAB
ALBUMIN SERPL BCP-MCNC: 2.8 G/DL (ref 3.5–5)
ALP SERPL-CCNC: 48 U/L (ref 34–104)
ALT SERPL W P-5'-P-CCNC: 34 U/L (ref 7–52)
ANION GAP SERPL CALCULATED.3IONS-SCNC: 8 MMOL/L (ref 4–13)
AST SERPL W P-5'-P-CCNC: 37 U/L (ref 13–39)
ATRIAL RATE: 116 BPM
BASOPHILS # BLD AUTO: 0.03 THOUSANDS/ΜL (ref 0–0.1)
BASOPHILS NFR BLD AUTO: 0 % (ref 0–1)
BILIRUB SERPL-MCNC: 0.23 MG/DL (ref 0.2–1)
BUN SERPL-MCNC: 24 MG/DL (ref 5–25)
CALCIUM ALBUM COR SERPL-MCNC: 9.3 MG/DL (ref 8.3–10.1)
CALCIUM SERPL-MCNC: 8.3 MG/DL (ref 8.4–10.2)
CHLORIDE SERPL-SCNC: 103 MMOL/L (ref 96–108)
CHOLEST SERPL-MCNC: 106 MG/DL
CO2 SERPL-SCNC: 29 MMOL/L (ref 21–32)
CREAT SERPL-MCNC: 0.65 MG/DL (ref 0.6–1.3)
EOSINOPHIL # BLD AUTO: 0.02 THOUSAND/ΜL (ref 0–0.61)
EOSINOPHIL NFR BLD AUTO: 0 % (ref 0–6)
ERYTHROCYTE [DISTWIDTH] IN BLOOD BY AUTOMATED COUNT: 14.7 % (ref 11.6–15.1)
GFR SERPL CREATININE-BSD FRML MDRD: 110 ML/MIN/1.73SQ M
GLUCOSE P FAST SERPL-MCNC: 148 MG/DL (ref 65–99)
GLUCOSE SERPL-MCNC: 141 MG/DL (ref 65–140)
GLUCOSE SERPL-MCNC: 146 MG/DL (ref 65–140)
GLUCOSE SERPL-MCNC: 148 MG/DL (ref 65–140)
GLUCOSE SERPL-MCNC: 198 MG/DL (ref 65–140)
HCT VFR BLD AUTO: 28.5 % (ref 36.5–49.3)
HDLC SERPL-MCNC: 11 MG/DL
HGB BLD-MCNC: 9.5 G/DL (ref 12–17)
IMM GRANULOCYTES # BLD AUTO: 0.07 THOUSAND/UL (ref 0–0.2)
IMM GRANULOCYTES NFR BLD AUTO: 1 % (ref 0–2)
LDLC SERPL CALC-MCNC: 56 MG/DL (ref 0–100)
LYMPHOCYTES # BLD AUTO: 1.1 THOUSANDS/ΜL (ref 0.6–4.47)
LYMPHOCYTES NFR BLD AUTO: 11 % (ref 14–44)
MCH RBC QN AUTO: 30.9 PG (ref 26.8–34.3)
MCHC RBC AUTO-ENTMCNC: 33.3 G/DL (ref 31.4–37.4)
MCV RBC AUTO: 93 FL (ref 82–98)
MONOCYTES # BLD AUTO: 2.8 THOUSAND/ΜL (ref 0.17–1.22)
MONOCYTES NFR BLD AUTO: 27 % (ref 4–12)
NEUTROPHILS # BLD AUTO: 6.2 THOUSANDS/ΜL (ref 1.85–7.62)
NEUTS SEG NFR BLD AUTO: 61 % (ref 43–75)
NRBC BLD AUTO-RTO: 0 /100 WBCS
P AXIS: -83 DEGREES
PLATELET # BLD AUTO: 103 THOUSANDS/UL (ref 149–390)
PMV BLD AUTO: 11.6 FL (ref 8.9–12.7)
POTASSIUM SERPL-SCNC: 3.6 MMOL/L (ref 3.5–5.3)
PR INTERVAL: 158 MS
PROCALCITONIN SERPL-MCNC: 6.69 NG/ML
PROT SERPL-MCNC: 5.7 G/DL (ref 6.4–8.4)
QRS AXIS: 79 DEGREES
QRSD INTERVAL: 136 MS
QT INTERVAL: 344 MS
QTC INTERVAL: 478 MS
RBC # BLD AUTO: 3.07 MILLION/UL (ref 3.88–5.62)
SODIUM SERPL-SCNC: 140 MMOL/L (ref 135–147)
T WAVE AXIS: -49 DEGREES
TRIGL SERPL-MCNC: 194 MG/DL
VENTRICULAR RATE: 116 BPM
WBC # BLD AUTO: 10.22 THOUSAND/UL (ref 4.31–10.16)

## 2021-12-21 PROCEDURE — G1004 CDSM NDSC: HCPCS

## 2021-12-21 PROCEDURE — 70551 MRI BRAIN STEM W/O DYE: CPT

## 2021-12-21 PROCEDURE — 80053 COMPREHEN METABOLIC PANEL: CPT | Performed by: NURSE PRACTITIONER

## 2021-12-21 PROCEDURE — 80061 LIPID PANEL: CPT | Performed by: NURSE PRACTITIONER

## 2021-12-21 PROCEDURE — 97167 OT EVAL HIGH COMPLEX 60 MIN: CPT

## 2021-12-21 PROCEDURE — 36415 COLL VENOUS BLD VENIPUNCTURE: CPT | Performed by: NURSE PRACTITIONER

## 2021-12-21 PROCEDURE — 99233 SBSQ HOSP IP/OBS HIGH 50: CPT | Performed by: NURSE PRACTITIONER

## 2021-12-21 PROCEDURE — 82948 REAGENT STRIP/BLOOD GLUCOSE: CPT

## 2021-12-21 PROCEDURE — 97163 PT EVAL HIGH COMPLEX 45 MIN: CPT

## 2021-12-21 PROCEDURE — 93010 ELECTROCARDIOGRAM REPORT: CPT | Performed by: INTERNAL MEDICINE

## 2021-12-21 PROCEDURE — 84145 PROCALCITONIN (PCT): CPT | Performed by: NURSE PRACTITIONER

## 2021-12-21 PROCEDURE — 85025 COMPLETE CBC W/AUTO DIFF WBC: CPT | Performed by: NURSE PRACTITIONER

## 2021-12-21 RX ADMIN — LISINOPRIL 10 MG: 10 TABLET ORAL at 10:54

## 2021-12-21 RX ADMIN — GABAPENTIN 100 MG: 100 CAPSULE ORAL at 21:57

## 2021-12-21 RX ADMIN — SODIUM CHLORIDE, SODIUM GLUCONATE, SODIUM ACETATE, POTASSIUM CHLORIDE, MAGNESIUM CHLORIDE, SODIUM PHOSPHATE, DIBASIC, AND POTASSIUM PHOSPHATE 100 ML/HR: .53; .5; .37; .037; .03; .012; .00082 INJECTION, SOLUTION INTRAVENOUS at 15:05

## 2021-12-21 RX ADMIN — DIVALPROEX SODIUM 1000 MG: 500 TABLET, EXTENDED RELEASE ORAL at 22:12

## 2021-12-21 RX ADMIN — DIVALPROEX SODIUM 500 MG: 500 TABLET, EXTENDED RELEASE ORAL at 10:53

## 2021-12-21 RX ADMIN — GABAPENTIN 100 MG: 100 CAPSULE ORAL at 10:54

## 2021-12-21 RX ADMIN — QUETIAPINE FUMARATE 400 MG: 50 TABLET, EXTENDED RELEASE ORAL at 22:11

## 2021-12-21 RX ADMIN — LEVETIRACETAM 1500 MG: 500 TABLET, FILM COATED ORAL at 10:52

## 2021-12-21 RX ADMIN — INSULIN LISPRO 1 UNITS: 100 INJECTION, SOLUTION INTRAVENOUS; SUBCUTANEOUS at 17:49

## 2021-12-21 RX ADMIN — LEVETIRACETAM 1500 MG: 500 TABLET, FILM COATED ORAL at 21:57

## 2021-12-21 RX ADMIN — BENZTROPINE MESYLATE 0.5 MG: 0.5 TABLET ORAL at 17:49

## 2021-12-21 RX ADMIN — QUETIAPINE FUMARATE 200 MG: 50 TABLET, EXTENDED RELEASE ORAL at 14:55

## 2021-12-21 RX ADMIN — GABAPENTIN 100 MG: 100 CAPSULE ORAL at 15:00

## 2021-12-21 RX ADMIN — LEVOTHYROXINE SODIUM 125 MCG: 25 TABLET ORAL at 10:49

## 2021-12-21 RX ADMIN — BENZTROPINE MESYLATE 0.5 MG: 0.5 TABLET ORAL at 10:54

## 2021-12-22 VITALS
WEIGHT: 144.84 LBS | BODY MASS INDEX: 19.62 KG/M2 | SYSTOLIC BLOOD PRESSURE: 151 MMHG | DIASTOLIC BLOOD PRESSURE: 81 MMHG | TEMPERATURE: 100.3 F | RESPIRATION RATE: 18 BRPM | HEIGHT: 72 IN | HEART RATE: 87 BPM | OXYGEN SATURATION: 98 %

## 2021-12-22 PROBLEM — E44.0 MODERATE PROTEIN-CALORIE MALNUTRITION (HCC): Status: ACTIVE | Noted: 2021-12-22

## 2021-12-22 LAB
ANION GAP SERPL CALCULATED.3IONS-SCNC: 6 MMOL/L (ref 4–13)
BACTERIA UR CULT: ABNORMAL
BASOPHILS # BLD AUTO: 0.04 THOUSANDS/ΜL (ref 0–0.1)
BASOPHILS NFR BLD AUTO: 1 % (ref 0–1)
BUN SERPL-MCNC: 17 MG/DL (ref 5–25)
CALCIUM SERPL-MCNC: 8.5 MG/DL (ref 8.4–10.2)
CHLORIDE SERPL-SCNC: 99 MMOL/L (ref 96–108)
CO2 SERPL-SCNC: 32 MMOL/L (ref 21–32)
CREAT SERPL-MCNC: 0.58 MG/DL (ref 0.6–1.3)
EOSINOPHIL # BLD AUTO: 0.1 THOUSAND/ΜL (ref 0–0.61)
EOSINOPHIL NFR BLD AUTO: 1 % (ref 0–6)
ERYTHROCYTE [DISTWIDTH] IN BLOOD BY AUTOMATED COUNT: 14.8 % (ref 11.6–15.1)
GFR SERPL CREATININE-BSD FRML MDRD: 115 ML/MIN/1.73SQ M
GLUCOSE SERPL-MCNC: 115 MG/DL (ref 65–140)
GLUCOSE SERPL-MCNC: 121 MG/DL (ref 65–140)
GLUCOSE SERPL-MCNC: 143 MG/DL (ref 65–140)
GLUCOSE SERPL-MCNC: 173 MG/DL (ref 65–140)
GLUCOSE SERPL-MCNC: 190 MG/DL (ref 65–140)
HCT VFR BLD AUTO: 27.5 % (ref 36.5–49.3)
HGB BLD-MCNC: 9.3 G/DL (ref 12–17)
IMM GRANULOCYTES # BLD AUTO: 0.11 THOUSAND/UL (ref 0–0.2)
IMM GRANULOCYTES NFR BLD AUTO: 1 % (ref 0–2)
LYMPHOCYTES # BLD AUTO: 1.48 THOUSANDS/ΜL (ref 0.6–4.47)
LYMPHOCYTES NFR BLD AUTO: 18 % (ref 14–44)
MCH RBC QN AUTO: 31 PG (ref 26.8–34.3)
MCHC RBC AUTO-ENTMCNC: 33.8 G/DL (ref 31.4–37.4)
MCV RBC AUTO: 92 FL (ref 82–98)
MONOCYTES # BLD AUTO: 1.96 THOUSAND/ΜL (ref 0.17–1.22)
MONOCYTES NFR BLD AUTO: 24 % (ref 4–12)
NEUTROPHILS # BLD AUTO: 4.62 THOUSANDS/ΜL (ref 1.85–7.62)
NEUTS SEG NFR BLD AUTO: 55 % (ref 43–75)
NRBC BLD AUTO-RTO: 0 /100 WBCS
PLATELET # BLD AUTO: 144 THOUSANDS/UL (ref 149–390)
PMV BLD AUTO: 11.5 FL (ref 8.9–12.7)
POTASSIUM SERPL-SCNC: 3.5 MMOL/L (ref 3.5–5.3)
PROCALCITONIN SERPL-MCNC: 3.63 NG/ML
RBC # BLD AUTO: 3 MILLION/UL (ref 3.88–5.62)
SODIUM SERPL-SCNC: 137 MMOL/L (ref 135–147)
VALPROATE FREE SERPL-MCNC: 17.3 UG/ML (ref 6–22)
WBC # BLD AUTO: 8.31 THOUSAND/UL (ref 4.31–10.16)

## 2021-12-22 PROCEDURE — 85025 COMPLETE CBC W/AUTO DIFF WBC: CPT | Performed by: NURSE PRACTITIONER

## 2021-12-22 PROCEDURE — 97110 THERAPEUTIC EXERCISES: CPT

## 2021-12-22 PROCEDURE — 84145 PROCALCITONIN (PCT): CPT | Performed by: NURSE PRACTITIONER

## 2021-12-22 PROCEDURE — 80048 BASIC METABOLIC PNL TOTAL CA: CPT | Performed by: NURSE PRACTITIONER

## 2021-12-22 PROCEDURE — 97116 GAIT TRAINING THERAPY: CPT

## 2021-12-22 PROCEDURE — 97535 SELF CARE MNGMENT TRAINING: CPT

## 2021-12-22 PROCEDURE — 97530 THERAPEUTIC ACTIVITIES: CPT

## 2021-12-22 PROCEDURE — 99233 SBSQ HOSP IP/OBS HIGH 50: CPT | Performed by: NURSE PRACTITIONER

## 2021-12-22 PROCEDURE — 87081 CULTURE SCREEN ONLY: CPT | Performed by: INTERNAL MEDICINE

## 2021-12-22 PROCEDURE — 82948 REAGENT STRIP/BLOOD GLUCOSE: CPT

## 2021-12-22 RX ADMIN — LEVETIRACETAM 1500 MG: 500 TABLET, FILM COATED ORAL at 09:41

## 2021-12-22 RX ADMIN — QUETIAPINE FUMARATE 200 MG: 50 TABLET, EXTENDED RELEASE ORAL at 15:49

## 2021-12-22 RX ADMIN — QUETIAPINE FUMARATE 200 MG: 50 TABLET, EXTENDED RELEASE ORAL at 09:41

## 2021-12-22 RX ADMIN — SODIUM CHLORIDE, SODIUM GLUCONATE, SODIUM ACETATE, POTASSIUM CHLORIDE, MAGNESIUM CHLORIDE, SODIUM PHOSPHATE, DIBASIC, AND POTASSIUM PHOSPHATE 100 ML/HR: .53; .5; .37; .037; .03; .012; .00082 INJECTION, SOLUTION INTRAVENOUS at 10:15

## 2021-12-22 RX ADMIN — BENZTROPINE MESYLATE 0.5 MG: 0.5 TABLET ORAL at 09:41

## 2021-12-22 RX ADMIN — LEVOTHYROXINE SODIUM 125 MCG: 25 TABLET ORAL at 05:44

## 2021-12-22 RX ADMIN — INSULIN LISPRO 1 UNITS: 100 INJECTION, SOLUTION INTRAVENOUS; SUBCUTANEOUS at 22:29

## 2021-12-22 RX ADMIN — GABAPENTIN 100 MG: 100 CAPSULE ORAL at 09:42

## 2021-12-22 RX ADMIN — INSULIN LISPRO 1 UNITS: 100 INJECTION, SOLUTION INTRAVENOUS; SUBCUTANEOUS at 12:33

## 2021-12-22 RX ADMIN — QUETIAPINE FUMARATE 400 MG: 50 TABLET, EXTENDED RELEASE ORAL at 22:27

## 2021-12-22 RX ADMIN — LEVETIRACETAM 1500 MG: 500 TABLET, FILM COATED ORAL at 20:27

## 2021-12-22 RX ADMIN — GABAPENTIN 100 MG: 100 CAPSULE ORAL at 17:42

## 2021-12-22 RX ADMIN — DIVALPROEX SODIUM 1000 MG: 500 TABLET, EXTENDED RELEASE ORAL at 22:28

## 2021-12-22 RX ADMIN — LISINOPRIL 10 MG: 10 TABLET ORAL at 09:41

## 2021-12-22 RX ADMIN — BENZTROPINE MESYLATE 0.5 MG: 0.5 TABLET ORAL at 17:42

## 2021-12-22 RX ADMIN — DIVALPROEX SODIUM 500 MG: 500 TABLET, EXTENDED RELEASE ORAL at 09:41

## 2021-12-23 ENCOUNTER — TRANSITIONAL CARE MANAGEMENT (OUTPATIENT)
Dept: FAMILY MEDICINE CLINIC | Facility: CLINIC | Age: 56
End: 2021-12-23

## 2021-12-23 LAB
ANION GAP SERPL CALCULATED.3IONS-SCNC: 8 MMOL/L (ref 4–13)
BACTERIA BLD CULT: ABNORMAL
BACTERIA BLD CULT: ABNORMAL
BASOPHILS # BLD AUTO: 0.05 THOUSANDS/ΜL (ref 0–0.1)
BASOPHILS NFR BLD AUTO: 1 % (ref 0–1)
BUN SERPL-MCNC: 15 MG/DL (ref 5–25)
CALCIUM SERPL-MCNC: 8.7 MG/DL (ref 8.4–10.2)
CHLORIDE SERPL-SCNC: 102 MMOL/L (ref 96–108)
CO2 SERPL-SCNC: 30 MMOL/L (ref 21–32)
CREAT SERPL-MCNC: 0.55 MG/DL (ref 0.6–1.3)
EOSINOPHIL # BLD AUTO: 0.1 THOUSAND/ΜL (ref 0–0.61)
EOSINOPHIL NFR BLD AUTO: 1 % (ref 0–6)
ERYTHROCYTE [DISTWIDTH] IN BLOOD BY AUTOMATED COUNT: 14.9 % (ref 11.6–15.1)
GFR SERPL CREATININE-BSD FRML MDRD: 118 ML/MIN/1.73SQ M
GLUCOSE SERPL-MCNC: 126 MG/DL (ref 65–140)
GLUCOSE SERPL-MCNC: 131 MG/DL (ref 65–140)
GRAM STN SPEC: ABNORMAL
GRAM STN SPEC: ABNORMAL
HCT VFR BLD AUTO: 27.9 % (ref 36.5–49.3)
HGB BLD-MCNC: 9.3 G/DL (ref 12–17)
IMM GRANULOCYTES # BLD AUTO: 0.16 THOUSAND/UL (ref 0–0.2)
IMM GRANULOCYTES NFR BLD AUTO: 2 % (ref 0–2)
LEVETIRACETAM SERPL-MCNC: 44.8 UG/ML (ref 10–40)
LYMPHOCYTES # BLD AUTO: 1.43 THOUSANDS/ΜL (ref 0.6–4.47)
LYMPHOCYTES NFR BLD AUTO: 17 % (ref 14–44)
MCH RBC QN AUTO: 30.4 PG (ref 26.8–34.3)
MCHC RBC AUTO-ENTMCNC: 33.3 G/DL (ref 31.4–37.4)
MCV RBC AUTO: 91 FL (ref 82–98)
MONOCYTES # BLD AUTO: 1.94 THOUSAND/ΜL (ref 0.17–1.22)
MONOCYTES NFR BLD AUTO: 22 % (ref 4–12)
NEUTROPHILS # BLD AUTO: 4.97 THOUSANDS/ΜL (ref 1.85–7.62)
NEUTS SEG NFR BLD AUTO: 57 % (ref 43–75)
NRBC BLD AUTO-RTO: 0 /100 WBCS
PLATELET # BLD AUTO: 167 THOUSANDS/UL (ref 149–390)
PMV BLD AUTO: 11.2 FL (ref 8.9–12.7)
POTASSIUM SERPL-SCNC: 3.6 MMOL/L (ref 3.5–5.3)
PROCALCITONIN SERPL-MCNC: 1.94 NG/ML
RBC # BLD AUTO: 3.06 MILLION/UL (ref 3.88–5.62)
SODIUM SERPL-SCNC: 140 MMOL/L (ref 135–147)
WBC # BLD AUTO: 8.65 THOUSAND/UL (ref 4.31–10.16)

## 2021-12-23 PROCEDURE — 84145 PROCALCITONIN (PCT): CPT | Performed by: NURSE PRACTITIONER

## 2021-12-23 PROCEDURE — 80048 BASIC METABOLIC PNL TOTAL CA: CPT | Performed by: NURSE PRACTITIONER

## 2021-12-23 PROCEDURE — 82948 REAGENT STRIP/BLOOD GLUCOSE: CPT

## 2021-12-23 PROCEDURE — 99238 HOSP IP/OBS DSCHRG MGMT 30/<: CPT | Performed by: NURSE PRACTITIONER

## 2021-12-23 PROCEDURE — 85025 COMPLETE CBC W/AUTO DIFF WBC: CPT | Performed by: NURSE PRACTITIONER

## 2021-12-23 RX ORDER — CEPHALEXIN 500 MG/1
500 CAPSULE ORAL EVERY 6 HOURS SCHEDULED
Qty: 40 CAPSULE | Refills: 0 | Status: SHIPPED | OUTPATIENT
Start: 2021-12-23 | End: 2022-01-02

## 2021-12-23 RX ORDER — CEFTRIAXONE 1 G/50ML
1000 INJECTION, SOLUTION INTRAVENOUS EVERY 24 HOURS
Status: DISCONTINUED | OUTPATIENT
Start: 2021-12-23 | End: 2021-12-23 | Stop reason: HOSPADM

## 2021-12-23 RX ADMIN — LEVOTHYROXINE SODIUM 125 MCG: 25 TABLET ORAL at 05:04

## 2021-12-23 RX ADMIN — DIVALPROEX SODIUM 500 MG: 500 TABLET, EXTENDED RELEASE ORAL at 09:43

## 2021-12-23 RX ADMIN — LISINOPRIL 10 MG: 10 TABLET ORAL at 09:43

## 2021-12-23 RX ADMIN — LEVETIRACETAM 1500 MG: 500 TABLET, FILM COATED ORAL at 09:43

## 2021-12-23 RX ADMIN — GABAPENTIN 100 MG: 100 CAPSULE ORAL at 09:43

## 2021-12-23 RX ADMIN — QUETIAPINE FUMARATE 200 MG: 50 TABLET, EXTENDED RELEASE ORAL at 09:43

## 2021-12-23 RX ADMIN — BENZTROPINE MESYLATE 0.5 MG: 0.5 TABLET ORAL at 09:43

## 2021-12-23 RX ADMIN — CEFTRIAXONE 1000 MG: 1 INJECTION, SOLUTION INTRAVENOUS at 09:43

## 2021-12-24 LAB — MRSA NOSE QL CULT: NORMAL

## 2021-12-26 LAB — LACOSAMIDE SERPL-MCNC: 15.1 UG/ML (ref 5–10)

## 2021-12-29 ENCOUNTER — OFFICE VISIT (OUTPATIENT)
Dept: OBGYN CLINIC | Facility: CLINIC | Age: 56
End: 2021-12-29
Payer: MEDICARE

## 2021-12-29 ENCOUNTER — APPOINTMENT (OUTPATIENT)
Dept: RADIOLOGY | Facility: CLINIC | Age: 56
End: 2021-12-29
Payer: MEDICARE

## 2021-12-29 VITALS — HEIGHT: 72 IN | BODY MASS INDEX: 19.5 KG/M2 | WEIGHT: 144 LBS

## 2021-12-29 DIAGNOSIS — S52.515D CLOSED NONDISPLACED FRACTURE OF STYLOID PROCESS OF LEFT RADIUS WITH ROUTINE HEALING, SUBSEQUENT ENCOUNTER: Primary | ICD-10-CM

## 2021-12-29 DIAGNOSIS — S52.515D CLOSED NONDISPLACED FRACTURE OF STYLOID PROCESS OF LEFT RADIUS WITH ROUTINE HEALING, SUBSEQUENT ENCOUNTER: ICD-10-CM

## 2021-12-29 PROCEDURE — 99213 OFFICE O/P EST LOW 20 MIN: CPT | Performed by: ORTHOPAEDIC SURGERY

## 2021-12-29 PROCEDURE — 73110 X-RAY EXAM OF WRIST: CPT

## 2022-01-21 ENCOUNTER — TELEPHONE (OUTPATIENT)
Dept: SURGERY | Facility: CLINIC | Age: 57
End: 2022-01-21

## 2022-01-21 NOTE — TELEPHONE ENCOUNTER
Trung Tucker from home health care called to make Dr Laverne Oneill aware that they will be re certify the patient for the next 60 days for wound care

## 2022-01-25 ENCOUNTER — TELEPHONE (OUTPATIENT)
Dept: FAMILY MEDICINE CLINIC | Facility: CLINIC | Age: 57
End: 2022-01-25

## 2022-01-25 NOTE — TELEPHONE ENCOUNTER
Pt DME company clalled  They will be resending an order specifically for pull-up style dispoable underwear  Adult diapers do not work for the pt, so they will send a new order to confirm

## 2022-02-09 DIAGNOSIS — I10 HYPERTENSION, UNSPECIFIED TYPE: ICD-10-CM

## 2022-02-09 DIAGNOSIS — E55.9 VITAMIN D DEFICIENCY: ICD-10-CM

## 2022-02-09 RX ORDER — MELATONIN
1000 DAILY
Qty: 90 TABLET | Refills: 1 | Status: SHIPPED | OUTPATIENT
Start: 2022-02-09 | End: 2022-03-28 | Stop reason: SDUPTHER

## 2022-02-09 RX ORDER — LISINOPRIL 10 MG/1
10 TABLET ORAL DAILY
Qty: 90 TABLET | Refills: 1 | Status: SHIPPED | OUTPATIENT
Start: 2022-02-09 | End: 2022-03-28 | Stop reason: SDUPTHER

## 2022-02-14 DIAGNOSIS — E53.8 B12 DEFICIENCY: ICD-10-CM

## 2022-02-15 ENCOUNTER — TELEPHONE (OUTPATIENT)
Dept: NEUROLOGY | Facility: CLINIC | Age: 57
End: 2022-02-15

## 2022-02-15 RX ORDER — LANOLIN ALCOHOL/MO/W.PET/CERES
CREAM (GRAM) TOPICAL
Qty: 14 TABLET | Refills: 4 | Status: SHIPPED | OUTPATIENT
Start: 2022-02-15 | End: 2022-03-28 | Stop reason: SDUPTHER

## 2022-02-15 NOTE — TELEPHONE ENCOUNTER
Marilyn'd call from [facility] services provider for pt  Eamon Falcon stated pt was at his sister's house yesterday, and pt's sister said pt had a "big jerking movement" and fell to the floor  Said it lasted seconds and pt was fine afterwards  Prior to that no known seizure activity  Medications confirmed as:    Vimpat 200 mg tabs 1 tab every 12 hours    Keppra 1500mg BID    Depakote 500 mg 1 tab in the AM and 2 tabs at HS    Pt has not been seen since 5/2021  Last Keppra level drawn was 12/20/21  Pt can't wear mask in the office for long per caretaker, is a virtual visit ok with you? Please advise      Eamon Falcon # [phone] cell              # [phone] office

## 2022-02-15 NOTE — TELEPHONE ENCOUNTER
Let's get him scheduled for follow up  He was supposed to be seen for a 3 month follow up after his visit 9 months ago  Any missed medication or issues with medication compliance?

## 2022-02-17 ENCOUNTER — TELEPHONE (OUTPATIENT)
Dept: SURGERY | Facility: CLINIC | Age: 57
End: 2022-02-17

## 2022-02-17 NOTE — TELEPHONE ENCOUNTER
Alma Mc from  [facility] in regards to [facility],  1965  She stated that his wound has not changed and has some wound care suggestions that she would like to discuss with Dr Rhina Mistry Text was sent to Dr Rhina Little

## 2022-02-17 NOTE — TELEPHONE ENCOUNTER
Discussed with Odin Running  Wound is still 2 5 x 2 2cm and at the skin surface, no necrosis, infection, debris, cellulitis  Some surrounding irritation  Dressing being changed twice a week  Wound care specialist recommending trial of collagen scaffolding puracol  I am in agreement and would like it to be changed three times a week

## 2022-02-18 ENCOUNTER — TELEMEDICINE (OUTPATIENT)
Dept: NEUROLOGY | Facility: CLINIC | Age: 57
End: 2022-02-18
Payer: MEDICARE

## 2022-02-18 DIAGNOSIS — G40.309 GENERALIZED CONVULSIVE EPILEPSY (HCC): ICD-10-CM

## 2022-02-18 PROCEDURE — 99214 OFFICE O/P EST MOD 30 MIN: CPT | Performed by: PSYCHIATRY & NEUROLOGY

## 2022-02-18 RX ORDER — CLONAZEPAM 0.25 MG/1
0.25 TABLET, ORALLY DISINTEGRATING ORAL AS NEEDED
Qty: 10 TABLET | Refills: 5 | Status: SHIPPED | OUTPATIENT
Start: 2022-02-18 | End: 2022-03-29 | Stop reason: SDUPTHER

## 2022-02-18 RX ORDER — DIVALPROEX SODIUM 500 MG
TABLET, EXTENDED RELEASE 24 HR ORAL
Qty: 90 TABLET | Refills: 11 | Status: SHIPPED | OUTPATIENT
Start: 2022-02-18 | End: 2022-03-29 | Stop reason: SDUPTHER

## 2022-02-18 RX ORDER — LACOSAMIDE 200 MG/1
200 TABLET ORAL EVERY 12 HOURS SCHEDULED
Qty: 60 TABLET | Refills: 5 | Status: SHIPPED | OUTPATIENT
Start: 2022-02-18 | End: 2022-03-29 | Stop reason: SDUPTHER

## 2022-02-18 NOTE — PATIENT INSTRUCTIONS
-- Continue his medications unchanged  --When he has a cluster of myoclonic jerks (more than 2 myoclonic jerks in a day), you can give clonazepam 0 25 mg once to try to help stop the cluster of events  -- Please get some blood work  If his blood counts look better, we may be able to increase his dose of Depakote

## 2022-02-18 NOTE — TELEPHONE ENCOUNTER
Niki Constantino returned call  Scheduled telemedicine visit for today at [phone] with dr han (patient does not do well with mask, this is best option)  Please send text to Niki Constantino  Number in appointment notes  Aylin - please see add on  Floyd/Anastasiia - please see add on

## 2022-02-18 NOTE — PROGRESS NOTES
Virtual Regular Visit    Verification of patient location:    Patient is located in the following state in which I hold an active license [place]      Assessment/Plan:    Patient ID: Niles Terry is a 49 y o  male with cerebral palsy, generalized epilepsy, and behavioral disorder, who is returning for follow-up of his seizures  Assessment/Plan:    Generalized convulsive epilepsy ([facility] [address] )  He has not had any generalized tonic clonic seizures, but has still had episodes of clusters of myoclonic seizures  They have not been consistently using the clonazepam when he has clusters of myoclonic seizures  -- because he has not had any additional generalized tonic clonic seizures, I will continue his current medications unchanged  -- they will start using the clonazepam more consistently when he has a cluster of myoclonus  -- I will check labs including medication levels  He has had low blood cell counts on his CBC, which his caregivers feel was more related to his poor prior nutrition  Depakote can be associated with lowered blood cell counts, to I would like to recheck this  If better, it is unlikely to be from the Depakote and we may be able to increase this further  He will Return in about 3 months (around 5/18/2022)  Reason for visit is   Chief Complaint   Patient presents with    Virtual Regular Visit        Encounter provider Ester Hall MD    Provider located at [facility] [id number]      Recent Visits  Date Type Provider Dept   02/18/22 Telemedicine Ester Hall MD  Neuro [address] recent visits within past 7 days and meeting all other requirements  Future Appointments  No visits were found meeting these conditions  Showing future appointments within next 150 days and meeting all other requirements       The patient was identified by name and date of birth   Niles Terry was informed that this is a telemedicine visit and that the visit is being conducted through [facility] and patient was informed this is a secure, HIPAA-complaint platform  He agrees to proceed     My office door was closed  No one else was in the room  He acknowledged consent and understanding of privacy and security of the video platform  The patient has agreed to participate and understands they can discontinue the visit at any time  Patient is aware this is a billable service  Subjective  Rodríguez Pastor is a 49 y o  male     Current seizure medications:  1  Depakote ER 500mg tablets (BRAND NECESSARY)- 1 tablet in the morning and 2 tablets at night  2  Levetiracetam 750mg tablets- 2 tablets twice per day  3  Lacosamide 200mg tablets- 1 tablet twice per day  Other medications as per Epic  Since his last visit, he hasn't had any additional generalized tonic clonic seizures  He has had af few hospitalizations, largely due to being sick from a wound  He was not eating as much and had other problems related to being in the hospital      His current caregiver has only been with him since September  They are not sure how he was doing before September  During the time since September, they note an individual myoclonic jerk about 1-2 times a month, often more when he is anxious  They did note he had one episode of several myoclonic jerks over the weekend when with his sister  These led to him falling  He was prescribed Clonazepam to take for clusters of myoclonic seizures at his last appointment  [place], it does appear this was filled in May 2021 and again in August 2021, but not since September 2021, which is when his current caregivers took over for him  They were not aware that this was a medication option for him, so he has not been getting it       Prior Medications: Valproate and Zonisamide (changed due to thrombocytopenia)    His history was also obtained from his caregivers, who were also present via [employer]       Lab Results   Component Value Date    WBC 7 62 02/21/2022    HGB 12 8 02/21/2022    HCT 40 2 02/21/2022     02/21/2022     Lab Results   Component Value Date    K 4 2 02/21/2022     02/21/2022    CO2 27 02/21/2022    BUN 22 02/21/2022    CREATININE 0 76 02/21/2022    CALCIUM 10 4 (H) 02/21/2022    MG 1 7 (L) 09/22/2021    PHOS 4 2 09/14/2021     Lab Results   Component Value Date    ALB 3 6 02/21/2022    ALKPHOS 50 02/21/2022    ALT 15 02/21/2022    AST 16 02/21/2022     Lab Results   Component Value Date    VALPROICTOT 84 02/21/2022    VALPROICTOT 62 12/20/2021    VALPROICTOT 68 4 09/05/2021    LEVETIRACETA 44 8 (H) 12/20/2021    LEVETIRACETA 25 3 05/18/2021    LEVETIRACETA 7 2 (L) 01/09/2021    ZONISAMIDE 20 1 11/28/2016             HPI     Past Medical History:   Diagnosis Date    [facility] ) 9/24/2019    Bacteremia due to Gram-positive bacteria 9/7/2021    [facility])     Depression     Diabetes (HCC)     Disease of thyroid gland     Gait disorder     Gluteal abscess 9/6/2021    Hyperlipidemia     Hypertension     Kidney failure     Kidney stones     Osteoporosis     Psychiatric disorder     Scoliosis     Seizures [facility]     Sepsis ([facility] ) 9/25/2019    Thyroid disease     UTI (urinary tract infection)        Past Surgical History:   Procedure Laterality Date    APPENDECTOMY      IR PICC PLACEMENT SINGLE LUMEN  9/13/2021    IR PICC PLACEMENT SINGLE LUMEN  9/16/2021       Current Outpatient Medications   Medication Sig Dispense Refill    benztropine (COGENTIN) 0 5 mg tablet TAKE 1 TABLET BY MOUTH TWICE A DAY (8AM,8PM) 56 tablet 4    Blood Glucose Monitoring Suppl (ONETOUCH VERIO) w/Device KIT by Does not apply route 3 (three) times a day TEST BLOOD SUGARS THREE TIMES 1 kit 0    calcium carbonate (OYSTER SHELL,OSCAL) 500 mg Take 1 tablet by mouth daily 90 tablet 1    cholecalciferol (VITAMIN D3) 1,000 units tablet Take 1 tablet (1,000 Units total) by mouth daily 90 tablet 1    Depakote  MG 24 hr tablet TAKE 1 TABLET BY MOUTH IN THE MORNING *BRAND NECESSARY*;TAKE 2 TABLETS BY MOUTH AT BEDTIME 90 tablet 11    docusate sodium (COLACE) 100 mg capsule TAKE 1 CAPSULE BY MOUTH TWICE A DAY (8AM,8PM) (DX: CONSTIPATION) 56 capsule 4    gabapentin (NEURONTIN) 100 mg capsule Take 100 mg by mouth 3 (three) times a day       Humidifiers (Cool Mist Humidifier 1 gallon) MISC Use daily at bedtime 1 each 0    hydrOXYzine pamoate (VISTARIL) 50 mg capsule TAKE 1 CAPSULE BY MOUTH 3 TIMES A DAY 84 capsule 10    Insulin Pen Needle (PEN NEEDLES) 31G X 5 MM MISC by Does not apply route daily 100 each 5    lacosamide (Vimpat) 200 mg tablet Take 1 tablet (200 mg total) by mouth every 12 (twelve) hours 60 tablet 5    levETIRAcetam (KEPPRA) 750 mg tablet TAKE 2 TABLETS (1500MG) BY MOUTH TWICE A  tablet 10    levothyroxine 125 mcg tablet Take 1 tablet (125 mcg total) by mouth daily 90 tablet 1    lisinopril (ZESTRIL) 10 mg tablet Take 1 tablet (10 mg total) by mouth daily 90 tablet 1    loratadine (CLARITIN) 10 mg tablet One tab daily as needed for allergy symptoms 90 tablet 0    metFORMIN (GLUCOPHAGE) 1000 MG tablet Take 1 tablet (1,000 mg total) by mouth 2 (two) times a day with meals 240 tablet 0    Multiple Vitamin (Daily-Reina) TABS Take 1 tablet by mouth daily Take at 8 AM 90 tablet 1    OneTouch Delica Lancets 37K MISC by Does not apply route daily TEST BLOOD SUGARS THREE TIMES DAILY 100 each 2    polyethylene glycol (MIRALAX) 17 g packet Take 17 g by mouth daily 14 each 8    QUEtiapine (SEROquel XR) 200 mg 24 hr tablet TAKE 1 TABLET BY MOUTH TWICE A DAY (8AM,2PM) 16 tablet 10    QUEtiapine (SEROquel XR) 400 mg 24 hr tablet TAKE 1 TABLET BY MOUTH AT BEDTIME (8PM) (DX: ANTIPSYCHOTIC) 90 tablet 1    simvastatin (ZOCOR) 40 mg tablet Take 1 tablet (40 mg total) by mouth daily 90 tablet 1    temazepam (RESTORIL) 15 mg capsule Take 1 capsule (15 mg total) by mouth daily at bedtime as needed for sleep 30 capsule 1    vitamin B-12 (VITAMIN B-12) 1,000 mcg tablet TAKE 1 TABLET BY MOUTH EVERY OTHER DAY (8AM) (DX: DEFICIENCY OF OTHER SPECIFIED B GROUP VITAMINS) 14 tablet 4    clonazePAM (KlonoPIN) 0 25 MG disintegrating tablet Take 1 tablet (0 25 mg total) by mouth as needed for seizures (clusters of myoclonic jerking (more than 2 myoclonic jerks in a day)) for up to 1 dose 10 tablet 5    sodium chloride (OCEAN) 0 65 % nasal spray 1 spray into each nostril as needed for congestion 15 mL 1     No current facility-administered medications for this visit  Allergies   Allergen Reactions    Erythromycin Other (See Comments)     Unknown per pt    Penicillins Other (See Comments)     Unknown per pt       Review of Systems   Constitutional: Negative  Negative for appetite change and fever  HENT: Negative  Negative for hearing loss, tinnitus, trouble swallowing and voice change  Eyes: Negative  Negative for photophobia and pain  Respiratory: Negative  Negative for shortness of breath  Cardiovascular: Negative  Negative for palpitations  Gastrointestinal: Negative  Negative for nausea and vomiting  Endocrine: Negative  Negative for cold intolerance  Genitourinary: Negative  Negative for dysuria, frequency and urgency  Musculoskeletal: Negative  Negative for myalgias and neck pain  Skin: Negative  Negative for rash  Neurological: Positive for seizures (jerking cause a fall)  Negative for dizziness, tremors, syncope, facial asymmetry, speech difficulty, weakness, light-headedness, numbness and headaches  Hematological: Negative  Does not bruise/bleed easily  Psychiatric/Behavioral: Negative  Negative for confusion, hallucinations and sleep disturbance  All other systems reviewed and are negative  Video Exam    There were no vitals filed for this visit      Physical Exam     I spent 12 minutes directly with the patient during this visit    [id number] [facility] verbally agrees to participate in [employer]  Pt is aware that [employer] could be limited without vital signs or the ability to perform a full hands-on physical exam  Jairon Oconnell understands he or the provider may request at any time to terminate the video visit and request the patient to seek care or treatment in person

## 2022-02-18 NOTE — PROGRESS NOTES
Virtual Regular Visit    Verification of patient location:    Patient is located in the following state in which I hold an active license { amb virtual patient location:[address]}      Assessment/Plan:    Problem List Items Addressed This Visit     None               Reason for visit is   Chief Complaint   Patient presents with    Virtual Regular Visit        Encounter provider Angel Chamberlain MD    Provider located at [facility]  [address] [place] [phone]      Recent Visits  Date Type Provider Dept   02/15/22 Telephone Clay Harris RN [facility] [address] recent visits within past 7 days and meeting all other requirements  Today's Visits  Date Type Provider Dept   02/18/22 Telemedicine Angel Chamberlain MD [facility] [address] today's visits and meeting all other requirements  Future Appointments  No visits were found meeting these conditions  Showing future appointments within next 150 days and meeting all other requirements       The patient was identified by name and date of birth  Emily Paulson was informed that this is a telemedicine visit and that the visit is being conducted through {AMB VIRTUAL VISIT LUBLOA:24171}  {Telemedicine confidentiality :59047} {Telemedicine participants:18985}  He acknowledged consent and understanding of privacy and security of the video platform  The patient has agreed to participate and understands they can discontinue the visit at any time  Patient is aware this is a billable service  Subjective  Emily Paulson is a 49 y o  male ***         HPI     Past Medical History:   Diagnosis Date    ADRIANA (acute kidney injury) ([facility] ) 9/24/2019    Bacteremia due to Gram-positive bacteria 9/7/2021    CP [facility]     Depression     Diabetes ([facility] )     Disease of thyroid gland     Gait disorder     Gluteal abscess 9/6/2021    Hyperlipidemia     Hypertension     Kidney failure     Kidney stones     Osteoporosis     Psychiatric disorder     Scoliosis     Seizures ([facility] [address] )     Sepsis ([facility] ) 9/25/2019    Thyroid disease     UTI (urinary tract infection)        Past Surgical History:   Procedure Laterality Date    APPENDECTOMY      IR PICC PLACEMENT SINGLE LUMEN  9/13/2021    IR PICC PLACEMENT SINGLE LUMEN  9/16/2021       Current Outpatient Medications   Medication Sig Dispense Refill    benztropine (COGENTIN) 0 5 mg tablet TAKE 1 TABLET BY MOUTH TWICE A DAY (8AM,8PM) 56 tablet 4    Blood Glucose Monitoring Suppl (Sonia [place]) w/Device KIT by Does not apply route 3 (three) times a day TEST BLOOD SUGARS THREE TIMES 1 kit 0    calcium carbonate (OYSTER SHELL,OSCAL) 500 mg Take 1 tablet by mouth daily 90 tablet 1    cholecalciferol (VITAMIN D3) 1,000 units tablet Take 1 tablet (1,000 Units total) by mouth daily 90 tablet 1    Depakote  MG 24 hr tablet TAKE 1 TABLET BY MOUTH IN THE MORNING *BRAND NECESSARY*;TAKE 2 TABLETS BY MOUTH AT BEDTIME 90 tablet 11    docusate sodium (COLACE) 100 mg capsule TAKE 1 CAPSULE BY MOUTH TWICE A DAY (8AM,8PM) (DX: CONSTIPATION) 56 capsule 4    gabapentin (NEURONTIN) 100 mg capsule Take 100 mg by mouth 3 (three) times a day       Humidifiers (Cool Mist Humidifier 1 gallon) MISC Use daily at bedtime 1 each 0    hydrOXYzine pamoate (VISTARIL) 50 mg capsule TAKE 1 CAPSULE BY MOUTH 3 TIMES A DAY 84 capsule 10    Insulin Pen Needle (PEN NEEDLES) 31G X 5 MM MISC by Does not apply route daily 100 each 5    levETIRAcetam (KEPPRA) 750 mg tablet TAKE 2 TABLETS (1500MG) BY MOUTH TWICE A  tablet 10    levothyroxine 125 mcg tablet Take 1 tablet (125 mcg total) by mouth daily 90 tablet 1    lisinopril (ZESTRIL) 10 mg tablet Take 1 tablet (10 mg total) by mouth daily 90 tablet 1    loratadine (CLARITIN) 10 mg tablet One tab daily as needed for allergy symptoms 90 tablet 0    metFORMIN (GLUCOPHAGE) 1000 MG tablet Take 1 tablet (1,000 mg total) by mouth 2 (two) times a day with meals 240 tablet 0    Multiple Vitamin (Daily-Reina) TABS Take 1 tablet by mouth daily Take at 8 AM 90 tablet 1    OneTouch Delica Lancets 69L MISC by Does not apply route daily TEST BLOOD SUGARS THREE TIMES DAILY 100 each 2    polyethylene glycol (MIRALAX) 17 g packet Take 17 g by mouth daily 14 each 8    QUEtiapine (SEROquel XR) 200 mg 24 hr tablet TAKE 1 TABLET BY MOUTH TWICE A DAY (8AM,2PM) 16 tablet 10    QUEtiapine (SEROquel XR) 400 mg 24 hr tablet TAKE 1 TABLET BY MOUTH AT BEDTIME (8PM) (DX: ANTIPSYCHOTIC) 90 tablet 1    simvastatin (ZOCOR) 40 mg tablet Take 1 tablet (40 mg total) by mouth daily 90 tablet 1    temazepam (RESTORIL) 15 mg capsule Take 1 capsule (15 mg total) by mouth daily at bedtime as needed for sleep 30 capsule 1    Vimpat 200 MG tablet TAKE 1 TABLET BY MOUTH EVERY 12 HOURS (DX: EPILEPSY) 56 tablet 2    vitamin B-12 (VITAMIN B-12) 1,000 mcg tablet TAKE 1 TABLET BY MOUTH EVERY OTHER DAY (8AM) (DX: DEFICIENCY OF OTHER SPECIFIED B GROUP VITAMINS) 14 tablet 4    sodium chloride (OCEAN) 0 65 % nasal spray 1 spray into each nostril as needed for congestion 15 mL 1     No current facility-administered medications for this visit  Allergies   Allergen Reactions    Erythromycin Other (See Comments)     Unknown per pt    Penicillins Other (See Comments)     Unknown per pt       Review of Systems    Video Exam    There were no vitals filed for this visit  Physical Exam     {Time Spent:63239}    VIRTUAL VISIT DISCLAIMER      Papo Oconnell verbally agrees to participate in [employer]  Pt is aware that [employer] could be limited without vital signs or the ability to perform a full hands-on physical exam  Jairon Oconnell understands he or the provider may request at any time to terminate the video visit and request the patient to seek care or treatment in person

## 2022-02-21 ENCOUNTER — LAB (OUTPATIENT)
Dept: LAB | Facility: CLINIC | Age: 57
End: 2022-02-21
Payer: MEDICARE

## 2022-02-21 DIAGNOSIS — D69.1 THROMBOCYTOPATHIA (HCC): ICD-10-CM

## 2022-02-21 DIAGNOSIS — G40.309 GENERALIZED CONVULSIVE EPILEPSY (HCC): ICD-10-CM

## 2022-02-21 DIAGNOSIS — E11.9 TYPE 2 DIABETES MELLITUS WITHOUT COMPLICATION, WITHOUT LONG-TERM CURRENT USE OF INSULIN (HCC): ICD-10-CM

## 2022-02-21 DIAGNOSIS — Z11.59 ENCOUNTER FOR HEPATITIS C SCREENING TEST FOR LOW RISK PATIENT: ICD-10-CM

## 2022-02-21 DIAGNOSIS — Z13.0 SCREENING FOR DEFICIENCY ANEMIA: ICD-10-CM

## 2022-02-21 DIAGNOSIS — Z12.5 PROSTATE CANCER SCREENING: ICD-10-CM

## 2022-02-21 DIAGNOSIS — Z12.5 SCREENING FOR MALIGNANT NEOPLASM OF PROSTATE: ICD-10-CM

## 2022-02-21 DIAGNOSIS — Z13.220 SCREENING FOR HYPERLIPIDEMIA: ICD-10-CM

## 2022-02-21 DIAGNOSIS — E03.9 ACQUIRED HYPOTHYROIDISM: ICD-10-CM

## 2022-02-21 DIAGNOSIS — Z11.4 SCREENING FOR HIV (HUMAN IMMUNODEFICIENCY VIRUS): ICD-10-CM

## 2022-02-21 LAB
ALBUMIN SERPL BCP-MCNC: 3.6 G/DL (ref 3.5–5)
ALP SERPL-CCNC: 50 U/L (ref 46–116)
ALT SERPL W P-5'-P-CCNC: 15 U/L (ref 12–78)
ANION GAP SERPL CALCULATED.3IONS-SCNC: 7 MMOL/L (ref 4–13)
AST SERPL W P-5'-P-CCNC: 16 U/L (ref 5–45)
BASOPHILS # BLD AUTO: 0.06 THOUSANDS/ΜL (ref 0–0.1)
BASOPHILS NFR BLD AUTO: 1 % (ref 0–1)
BILIRUB SERPL-MCNC: 0.3 MG/DL (ref 0.2–1)
BUN SERPL-MCNC: 22 MG/DL (ref 5–25)
CALCIUM SERPL-MCNC: 10.4 MG/DL (ref 8.3–10.1)
CHLORIDE SERPL-SCNC: 107 MMOL/L (ref 100–108)
CO2 SERPL-SCNC: 27 MMOL/L (ref 21–32)
CREAT SERPL-MCNC: 0.76 MG/DL (ref 0.6–1.3)
EOSINOPHIL # BLD AUTO: 0.39 THOUSAND/ΜL (ref 0–0.61)
EOSINOPHIL NFR BLD AUTO: 5 % (ref 0–6)
ERYTHROCYTE [DISTWIDTH] IN BLOOD BY AUTOMATED COUNT: 14.4 % (ref 11.6–15.1)
GFR SERPL CREATININE-BSD FRML MDRD: 103 ML/MIN/1.73SQ M
GLUCOSE P FAST SERPL-MCNC: 92 MG/DL (ref 65–99)
HCT VFR BLD AUTO: 40.2 % (ref 36.5–49.3)
HGB BLD-MCNC: 12.8 G/DL (ref 12–17)
IMM GRANULOCYTES # BLD AUTO: 0.03 THOUSAND/UL (ref 0–0.2)
IMM GRANULOCYTES NFR BLD AUTO: 0 % (ref 0–2)
LYMPHOCYTES # BLD AUTO: 2.49 THOUSANDS/ΜL (ref 0.6–4.47)
LYMPHOCYTES NFR BLD AUTO: 33 % (ref 14–44)
MCH RBC QN AUTO: 30.7 PG (ref 26.8–34.3)
MCHC RBC AUTO-ENTMCNC: 31.8 G/DL (ref 31.4–37.4)
MCV RBC AUTO: 96 FL (ref 82–98)
MONOCYTES # BLD AUTO: 1.08 THOUSAND/ΜL (ref 0.17–1.22)
MONOCYTES NFR BLD AUTO: 14 % (ref 4–12)
NEUTROPHILS # BLD AUTO: 3.57 THOUSANDS/ΜL (ref 1.85–7.62)
NEUTS SEG NFR BLD AUTO: 47 % (ref 43–75)
NRBC BLD AUTO-RTO: 0 /100 WBCS
PLATELET # BLD AUTO: 193 THOUSANDS/UL (ref 149–390)
PMV BLD AUTO: 11.4 FL (ref 8.9–12.7)
POTASSIUM SERPL-SCNC: 4.2 MMOL/L (ref 3.5–5.3)
PROT SERPL-MCNC: 8.4 G/DL (ref 6.4–8.2)
RBC # BLD AUTO: 4.17 MILLION/UL (ref 3.88–5.62)
SODIUM SERPL-SCNC: 141 MMOL/L (ref 136–145)
VALPROATE SERPL-MCNC: 84 UG/ML (ref 50–100)
WBC # BLD AUTO: 7.62 THOUSAND/UL (ref 4.31–10.16)

## 2022-02-21 PROCEDURE — 80164 ASSAY DIPROPYLACETIC ACD TOT: CPT

## 2022-02-21 PROCEDURE — 80053 COMPREHEN METABOLIC PANEL: CPT

## 2022-02-21 PROCEDURE — 80177 DRUG SCRN QUAN LEVETIRACETAM: CPT

## 2022-02-21 PROCEDURE — 80235 DRUG ASSAY LACOSAMIDE: CPT

## 2022-02-21 PROCEDURE — 36415 COLL VENOUS BLD VENIPUNCTURE: CPT

## 2022-02-21 PROCEDURE — 85025 COMPLETE CBC W/AUTO DIFF WBC: CPT

## 2022-02-23 ENCOUNTER — OFFICE VISIT (OUTPATIENT)
Dept: OBGYN CLINIC | Facility: CLINIC | Age: 57
End: 2022-02-23
Payer: MEDICARE

## 2022-02-23 ENCOUNTER — APPOINTMENT (OUTPATIENT)
Dept: RADIOLOGY | Facility: CLINIC | Age: 57
End: 2022-02-23
Payer: MEDICARE

## 2022-02-23 VITALS — BODY MASS INDEX: 19.53 KG/M2 | HEIGHT: 72 IN

## 2022-02-23 DIAGNOSIS — S52.515D CLOSED NONDISPLACED FRACTURE OF STYLOID PROCESS OF LEFT RADIUS WITH ROUTINE HEALING, SUBSEQUENT ENCOUNTER: Primary | ICD-10-CM

## 2022-02-23 DIAGNOSIS — E11.9 TYPE 2 DIABETES MELLITUS WITHOUT COMPLICATION, WITH LONG-TERM CURRENT USE OF INSULIN (HCC): ICD-10-CM

## 2022-02-23 DIAGNOSIS — S52.515D CLOSED NONDISPLACED FRACTURE OF STYLOID PROCESS OF LEFT RADIUS WITH ROUTINE HEALING, SUBSEQUENT ENCOUNTER: ICD-10-CM

## 2022-02-23 DIAGNOSIS — Z79.4 TYPE 2 DIABETES MELLITUS WITHOUT COMPLICATION, WITH LONG-TERM CURRENT USE OF INSULIN (HCC): ICD-10-CM

## 2022-02-23 PROCEDURE — 99213 OFFICE O/P EST LOW 20 MIN: CPT | Performed by: ORTHOPAEDIC SURGERY

## 2022-02-23 PROCEDURE — 73110 X-RAY EXAM OF WRIST: CPT

## 2022-02-23 NOTE — PROGRESS NOTES
Chief Complaint  Wrist pain left    History Of Presenting Illness  Oswald Mckeon 1965 presents with left wrist pain due to nondisplaced radial styloid tip fracture  Sustained November 23rd, 2021  Patient has no complaints today    Patient presents for evaluation of x-rays      Current Medications  Current Outpatient Medications   Medication Sig Dispense Refill    benztropine (COGENTIN) 0 5 mg tablet TAKE 1 TABLET BY MOUTH TWICE A DAY (8AM,8PM) 56 tablet 4    Blood Glucose Monitoring Suppl ([place]) w/Device KIT by Does not apply route 3 (three) times a day TEST BLOOD SUGARS THREE TIMES 1 kit 0    calcium carbonate (OYSTER SHELL,OSCAL) 500 mg Take 1 tablet by mouth daily 90 tablet 1    cholecalciferol (VITAMIN D3) 1,000 units tablet Take 1 tablet (1,000 Units total) by mouth daily 90 tablet 1    clonazePAM (KlonoPIN) 0 25 MG disintegrating tablet Take 1 tablet (0 25 mg total) by mouth as needed for seizures (clusters of myoclonic jerking (more than 2 myoclonic jerks in a day)) for up to 1 dose 10 tablet 5    Depakote  MG 24 hr tablet TAKE 1 TABLET BY MOUTH IN THE MORNING *BRAND NECESSARY*;TAKE 2 TABLETS BY MOUTH AT BEDTIME 90 tablet 11    docusate sodium (COLACE) 100 mg capsule TAKE 1 CAPSULE BY MOUTH TWICE A DAY (8AM,8PM) (DX: CONSTIPATION) 56 capsule 4    gabapentin (NEURONTIN) 100 mg capsule Take 100 mg by mouth 3 (three) times a day       hydrOXYzine pamoate (VISTARIL) 50 mg capsule TAKE 1 CAPSULE BY MOUTH 3 TIMES A DAY 84 capsule 10    Insulin Pen Needle (PEN NEEDLES) 31G X 5 MM MISC by Does not apply route daily 100 each 5    lacosamide (Vimpat) 200 mg tablet Take 1 tablet (200 mg total) by mouth every 12 (twelve) hours 60 tablet 5    levETIRAcetam (KEPPRA) 750 mg tablet TAKE 2 TABLETS (1500MG) BY MOUTH TWICE A  tablet 10    levothyroxine 125 mcg tablet Take 1 tablet (125 mcg total) by mouth daily 90 tablet 1    lisinopril (ZESTRIL) 10 mg tablet Take 1 tablet (10 mg total) by mouth daily 90 tablet 1    loratadine (CLARITIN) 10 mg tablet One tab daily as needed for allergy symptoms 90 tablet 0    metFORMIN (GLUCOPHAGE) 1000 MG tablet Take 1 tablet (1,000 mg total) by mouth 2 (two) times a day with meals 240 tablet 0    Multiple Vitamin (Daily-Reina) TABS Take 1 tablet by mouth daily Take at 8 AM 90 tablet 1    OneTouch Delica Lancets 37O MISC by Does not apply route daily TEST BLOOD SUGARS THREE TIMES DAILY 100 each 2    polyethylene glycol (MIRALAX) 17 g packet Take 17 g by mouth daily 14 each 8    QUEtiapine (SEROquel XR) 200 mg 24 hr tablet TAKE 1 TABLET BY MOUTH TWICE A DAY (8AM,2PM) 16 tablet 10    QUEtiapine (SEROquel XR) 400 mg 24 hr tablet TAKE 1 TABLET BY MOUTH AT BEDTIME (8PM) (DX: ANTIPSYCHOTIC) 90 tablet 1    simvastatin (ZOCOR) 40 mg tablet Take 1 tablet (40 mg total) by mouth daily 90 tablet 1    temazepam (RESTORIL) 15 mg capsule Take 1 capsule (15 mg total) by mouth daily at bedtime as needed for sleep 30 capsule 1    vitamin B-12 (VITAMIN B-12) 1,000 mcg tablet TAKE 1 TABLET BY MOUTH EVERY OTHER DAY (8AM) (DX: DEFICIENCY OF OTHER SPECIFIED B GROUP VITAMINS) 14 tablet 4    Humidifiers (Cool Mist Humidifier 1 gallon) MISC Use daily at bedtime 1 each 0    sodium chloride (OCEAN) 0 65 % nasal spray 1 spray into each nostril as needed for congestion 15 mL 1     No current facility-administered medications for this visit         Current Problems    Active Problems:   Patient Active Problem List    Diagnosis Date Noted    Moderate protein-calorie malnutrition ([facility] ) 12/22/2021    Buttock wound, left, subsequent encounter 11/05/2021    Social problem 09/16/2021    Hypomagnesemia 09/12/2021    Thrombocytopathia ([facility] [address] ) 03/30/2020    Hyponatremia 09/28/2019    Severe sepsis ([facility] ) 09/25/2019    Acute cystitis 93/62/2292    Metabolic encephalopathy 44/96/1745    Seborrheic dermatitis of scalp 08/01/2019    Nearsightedness 08/01/2019    Behavior concern in adult 08/01/2019    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[facility] ) 08/01/2019    Vitamin B12 deficiency 03/08/2017    Hyperlipidemia 09/16/2016    Vitamin D deficiency 09/16/2016    Type 2 diabetes mellitus without complication, without long-term current use of insulin ([place] [address] ) 06/15/2016    Acquired hypothyroidism 06/15/2016    Cataract 11/22/2013    Cerebral palsy ([place] [address] ) 11/22/2013    Generalized convulsive epilepsy ([place] [address] ) 11/22/2013    Essential hypertension 11/22/2013    Osteoporosis 11/22/2013    Scoliosis 11/22/2013         Review of Systems:    General: negative for - chills, fatigue, fever,  weight gain or weight loss  Psychological: negative for - anxiety, behavioral disorder, concentration difficulties  Ophthalmic: negative for - blurry vision, decreased vision, double vision,      Past Medical History:   Past Medical History:   Diagnosis Date    ADRIANA (acute kidney injury) ([place] [address] ) 9/24/2019    Bacteremia due to Gram-positive bacteria 9/7/2021    [facility]     Depression     Diabetes ([place] [address] )     Disease of thyroid gland     Gait disorder     Gluteal abscess 9/6/2021    Hyperlipidemia     Hypertension     Kidney failure     Kidney stones     Osteoporosis     Psychiatric disorder     Scoliosis     Seizures ([place] [address] )     Sepsis ([place] [address] ) 9/25/2019    Thyroid disease     UTI (urinary tract infection)        Past Surgical History:   Past Surgical History:   Procedure Laterality Date    APPENDECTOMY      IR PICC PLACEMENT SINGLE LUMEN  9/13/2021    IR PICC PLACEMENT SINGLE LUMEN  9/16/2021       Family History:  Family history reviewed and non-contributory  History reviewed  No pertinent family history      Social History:  Social History     Socioeconomic History    Marital status: Single     Spouse name: None    Number of children: None    Years of education: None    Highest education level: None   Occupational History    None   Tobacco Use    Smoking status: Never Smoker  Smokeless tobacco: Never Used   Vaping Use    Vaping Use: Never used   Substance and Sexual Activity    Alcohol use: Never    Drug use: No    Sexual activity: Not Currently   Other Topics Concern    None   Social History Narrative    None     Social Determinants of Health     Financial Resource Strain: Not on file   Food Insecurity: No Food Insecurity    Worried About Running Out of Food in the Last Year: Never true    Melinda of Food in the Last Year: Never true   Transportation Needs: No Transportation Needs    Lack of Transportation (Medical): No    Lack of Transportation (Non-Medical): No   Physical Activity: Not on file   Stress: Not on file   Social Connections: Not on file   Intimate Partner Violence: Not on file   Housing Stability: Low Risk     Unable to Pay for Housing in the Last Year: No    Number of Places Lived in the Last Year: 1    Unstable Housing in the Last Year: No       Allergies: Allergies   Allergen Reactions    Erythromycin Other (See Comments)     Unknown per pt    Penicillins Other (See Comments)     Unknown per pt           Physical ExaminationHt 6' (1 829 m)   BMI 19 53 kg/m²   Gen: Alert and oriented to person, place, time  HEENT: EOMI, eyes clear, moist mucus membranes, hearing intact      Orthopedic Exam  Left wrist no swelling or deformity excellent range of pain-free motion radiographs satisfactory          Impression  Healed left distal radius fracture styloid tip        Plan    Patient allowed all activities as tolerated  , strengthening with PT OT hand therapy follow-up p r n  3 months will only x-rays symptomatic    Fei Wallis MD        Portions of the record may have been created with voice recognition software  Occasional wrong word or "sound a like" substitutions may have occurred due to the inherent limitations of voice recognition software  Read the chart carefully and recognize, using context, where substitutions have occurred

## 2022-02-23 NOTE — ASSESSMENT & PLAN NOTE
He has not had any generalized tonic clonic seizures, but has still had episodes of clusters of myoclonic seizures  They have not been consistently using the clonazepam when he has clusters of myoclonic seizures  -- because he has not had any additional generalized tonic clonic seizures, I will continue his current medications unchanged  -- they will start using the clonazepam more consistently when he has a cluster of myoclonus  -- I will check labs including medication levels  He has had low blood cell counts on his CBC, which his caregivers feel was more related to his poor prior nutrition  Depakote can be associated with lowered blood cell counts, to I would like to recheck this  If better, it is unlikely to be from the Depakote and we may be able to increase this further

## 2022-02-24 LAB — LACOSAMIDE SERPL-MCNC: 1.9 UG/ML (ref 5–10)

## 2022-02-28 LAB — LEVETIRACETAM SERPL-MCNC: 19.2 UG/ML (ref 10–40)

## 2022-03-09 ENCOUNTER — OFFICE VISIT (OUTPATIENT)
Dept: FAMILY MEDICINE CLINIC | Facility: CLINIC | Age: 57
End: 2022-03-09
Payer: MEDICARE

## 2022-03-09 VITALS
WEIGHT: 150 LBS | OXYGEN SATURATION: 96 % | HEART RATE: 85 BPM | SYSTOLIC BLOOD PRESSURE: 142 MMHG | TEMPERATURE: 97.4 F | DIASTOLIC BLOOD PRESSURE: 80 MMHG | BODY MASS INDEX: 20.32 KG/M2 | HEIGHT: 72 IN

## 2022-03-09 DIAGNOSIS — L89.324 PRESSURE INJURY OF LEFT BUTTOCK, STAGE 4 (HCC): ICD-10-CM

## 2022-03-09 DIAGNOSIS — E08.00 DIABETES MELLITUS DUE TO UNDERLYING CONDITION WITH HYPEROSMOLARITY WITHOUT COMA, WITHOUT LONG-TERM CURRENT USE OF INSULIN (HCC): Primary | ICD-10-CM

## 2022-03-09 DIAGNOSIS — Z12.5 SCREENING FOR MALIGNANT NEOPLASM OF PROSTATE: ICD-10-CM

## 2022-03-09 DIAGNOSIS — E78.5 HYPERLIPIDEMIA, UNSPECIFIED HYPERLIPIDEMIA TYPE: ICD-10-CM

## 2022-03-09 DIAGNOSIS — D69.1 THROMBOCYTOPATHIA (HCC): ICD-10-CM

## 2022-03-09 DIAGNOSIS — G83.89 CEREBRAL PARESIS WITH HOMOLATERAL ATAXIA (HCC): ICD-10-CM

## 2022-03-09 DIAGNOSIS — G40.309 GENERALIZED IDIOPATHIC EPILEPSY AND EPILEPTIC SYNDROMES, NOT INTRACTABLE, WITHOUT STATUS EPILEPTICUS (HCC): ICD-10-CM

## 2022-03-09 DIAGNOSIS — E03.9 ACQUIRED HYPOTHYROIDISM: ICD-10-CM

## 2022-03-09 DIAGNOSIS — G80.9 CEREBRAL PALSY, UNSPECIFIED TYPE (HCC): ICD-10-CM

## 2022-03-09 DIAGNOSIS — R32 URINARY INCONTINENCE, UNSPECIFIED TYPE: ICD-10-CM

## 2022-03-09 DIAGNOSIS — E44.0 MODERATE PROTEIN-CALORIE MALNUTRITION (HCC): ICD-10-CM

## 2022-03-09 LAB — SL AMB POCT HEMOGLOBIN AIC: 5.5 (ref ?–6.5)

## 2022-03-09 PROCEDURE — G0438 PPPS, INITIAL VISIT: HCPCS | Performed by: NURSE PRACTITIONER

## 2022-03-09 PROCEDURE — 99214 OFFICE O/P EST MOD 30 MIN: CPT | Performed by: NURSE PRACTITIONER

## 2022-03-09 PROCEDURE — 83036 HEMOGLOBIN GLYCOSYLATED A1C: CPT | Performed by: NURSE PRACTITIONER

## 2022-03-09 RX ORDER — SIMVASTATIN 40 MG
40 TABLET ORAL DAILY
Qty: 90 TABLET | Refills: 1 | Status: SHIPPED | OUTPATIENT
Start: 2022-03-09 | End: 2022-03-28 | Stop reason: SDUPTHER

## 2022-03-09 RX ORDER — CALCIUM CARBONATE 500(1250)
1 TABLET ORAL DAILY
Qty: 90 TABLET | Refills: 1 | Status: SHIPPED | OUTPATIENT
Start: 2022-03-09 | End: 2022-03-28 | Stop reason: SDUPTHER

## 2022-03-09 RX ORDER — BROMPHENIRAMIN/PSEUDOEPHEDRINE 1-15MG/5ML
LIQUID (ML) ORAL DAILY
Qty: 20 EACH | Refills: 11 | Status: SHIPPED | OUTPATIENT
Start: 2022-03-09

## 2022-03-09 NOTE — PATIENT INSTRUCTIONS
Medicare Preventive Visit Patient Instructions  Thank you for completing your Welcome to Medicare Visit or Medicare Annual Wellness Visit today  Your next wellness visit will be due in one year (3/10/2023)  The screening/preventive services that you may require over the next 5-10 years are detailed below  Some tests may not apply to you based off risk factors and/or age  Screening tests ordered at today's visit but not completed yet may show as past due  Also, please note that scanned in results may not display below  Preventive Screenings:  Service Recommendations Previous Testing/Comments   Colorectal Cancer Screening  · Colonoscopy    · Fecal Occult Blood Test (FOBT)/Fecal Immunochemical Test (FIT)  · Fecal DNA/Cologuard Test  · Flexible Sigmoidoscopy Age: 54-65 years old   Colonoscopy: every 10 years (May be performed more frequently if at higher risk)  OR  FOBT/FIT: every 1 year  OR  Cologuard: every 3 years  OR  Sigmoidoscopy: every 5 years  Screening may be recommended earlier than age 48 if at higher risk for colorectal cancer  Also, an individualized decision between you and your healthcare provider will decide whether screening between the ages of 74-80 would be appropriate   Colonoscopy: Not on file  FOBT/FIT: Not on file  Cologuard: Not on file  Sigmoidoscopy: Not on file          Prostate Cancer Screening Individualized decision between patient and health care provider in men between ages of 53-78   Medicare will cover every 12 months beginning on the day after your 50th birthday PSA: 1 5 ng/mL           Hepatitis C Screening Once for adults born between 1945 and 1965  More frequently in patients at high risk for Hepatitis C Hep C Antibody: Not on file        Diabetes Screening 1-2 times per year if you're at risk for diabetes or have pre-diabetes Fasting glucose: 92 mg/dL   A1C: 5 2    Screening Not Indicated  History Diabetes   Cholesterol Screening Once every 5 years if you don't have a lipid disorder  May order more often based on risk factors  Lipid panel: 12/21/2021    Screening Not Indicated  History Lipid Disorder      Other Preventive Screenings Covered by Medicare:  1  Abdominal Aortic Aneurysm (AAA) Screening: covered once if your at risk  You're considered to be at risk if you have a family history of AAA or a male between the age of 73-68 who smoking at least 100 cigarettes in your lifetime  2  Lung Cancer Screening: covers low dose CT scan once per year if you meet all of the following conditions: (1) Age 50-69; (2) No signs or symptoms of lung cancer; (3) Current smoker or have quit smoking within the last 15 years; (4) You have a tobacco smoking history of at least 30 pack years (packs per day x number of years you smoked); (5) You get a written order from a healthcare provider  3  Glaucoma Screening: covered annually if you're considered high risk: (1) You have diabetes OR (2) Family history of glaucoma OR (3)  aged 48 and older OR (3)  American aged 72 and older  3  Osteoporosis Screening: covered every 2 years if you meet one of the following conditions: (1) Have a vertebral abnormality; (2) On glucocorticoid therapy for more than 3 months; (3) Have primary hyperparathyroidism; (4) On osteoporosis medications and need to assess response to drug therapy  5  HIV Screening: covered annually if you're between the age of 12-76  Also covered annually if you are younger than 13 and older than 72 with risk factors for HIV infection  For pregnant patients, it is covered up to 3 times per pregnancy      Immunizations:  Immunization Recommendations   Influenza Vaccine Annual influenza vaccination during flu season is recommended for all persons aged >= 6 months who do not have contraindications   Pneumococcal Vaccine (Prevnar and Pneumovax)  * Prevnar = PCV13  * Pneumovax = PPSV23 Adults 25-60 years old: 1-3 doses may be recommended based on certain risk factors  Adults 65+ years old: Prevnar (PCV13) vaccine recommended followed by Pneumovax (PPSV23) vaccine  If already received PPSV23 since turning 65, then PCV13 recommended at least one year after PPSV23 dose  Hepatitis B Vaccine 3 dose series if at intermediate or high risk (ex: diabetes, end stage renal disease, liver disease)   Tetanus (Td) Vaccine - COST NOT COVERED BY MEDICARE PART B Following completion of primary series, a booster dose should be given every 10 years to maintain immunity against tetanus  Td may also be given as tetanus wound prophylaxis  Tdap Vaccine - COST NOT COVERED BY MEDICARE PART B Recommended at least once for all adults  For pregnant patients, recommended with each pregnancy  Shingles Vaccine (Shingrix) - COST NOT COVERED BY MEDICARE PART B  2 shot series recommended in those aged 48 and above     Health Maintenance Due:      Topic Date Due    Hepatitis C Screening  Never done    HIV Screening  Never done    Colorectal Cancer Screening  Never done     Immunizations Due:      Topic Date Due    COVID-19 Vaccine (2 - Booster for [employer] series) 10/05/2021     Advance Directives   What are advance directives? Advance directives are legal documents that state your wishes and plans for medical care  These plans are made ahead of time in case you lose your ability to make decisions for yourself  Advance directives can apply to any medical decision, such as the treatments you want, and if you want to donate organs  What are the types of advance directives? There are many types of advance directives, and each state has rules about how to use them  You may choose a combination of any of the following:  · Living will: This is a written record of the treatment you want  You can also choose which treatments you do not want, which to limit, and which to stop at a certain time  This includes surgery, medicine, IV fluid, and tube feedings  · Durable power of  for healthcare [facility]):   This is a written record that states who you want to make healthcare choices for you when you are unable to make them for yourself  This person, called a proxy, is usually a family member or a friend  You may choose more than 1 proxy  · Do not resuscitate (DNR) order:  A DNR order is used in case your heart stops beating or you stop breathing  It is a request not to have certain forms of treatment, such as CPR  A DNR order may be included in other types of advance directives  · Medical directive: This covers the care that you want if you are in a coma, near death, or unable to make decisions for yourself  You can list the treatments you want for each condition  Treatment may include pain medicine, surgery, blood transfusions, dialysis, IV or tube feedings, and a ventilator (breathing machine)  · Values history: This document has questions about your views, beliefs, and how you feel and think about life  This information can help others choose the care that you would choose  Why are advance directives important? An advance directive helps you control your care  Although spoken wishes may be used, it is better to have your wishes written down  Spoken wishes can be misunderstood, or not followed  Treatments may be given even if you do not want them  An advance directive may make it easier for your family to make difficult choices about your care  © Copyright [employer] 2018 Information is for End User's use only and may not be sold, redistributed or otherwise used for commercial purposes  All illustrations and images included in CareNotes® are the copyrighted property of [employer]  or [address] [place] [address] with Diabetes Exchanges   AMBULATORY CARE:   Diabetes exchanges  are servings of food that contain similar amounts of carbohydrate, fat, protein, and calories within a food group   The exchanges can be used to develop a healthy meal plan that helps to keep your blood sugar within the recommended levels  A meal plan with the right amount of carbohydrates is especially important  Your blood sugar naturally rises after you eat carbohydrates  Too many carbohydrates in 1 meal or snack can raise your blood sugar level  Carbohydrates are found in starches, fruit, milk, yogurt, and sweets  Call your doctor if:   · You have high blood sugar levels during a certain time of day, or almost all of the time  · You often have low blood sugar levels  · You have questions or concerns about your condition or care  Create a meal plan with exchanges:  A dietitian will work with you to develop a healthy meal plan that is right for you  This meal plan will include the amount of exchanges you can have from each food group throughout the day  Follow your meal plan by keeping track of the amount of exchanges you eat for each meal and snack  Your meal plan will be based on your age, weight, blood sugar levels, medicine, and activity level  Starch food group exchanges:  Each exchange below contains about 15 grams of carbohydrate , 3 grams of protein, 1 gram of fat, and 80 calories  · 1 ounce of white, whole wheat or rye bread (1 slice)    · 1 ounce of bagel (about ¼ of a bagel)    · 1 6-inch flour or corn tortilla or 1 4-inch pancake (about ¼ inch thick)    · ? cup of cooked pasta or rice    · ¾ cup of dry, ready-to-eat cereal with no sugar added     · ½ cup of cooked cereal, such as oatmeal    · 3 laureano cracker squares or 8 animal crackers    · 6 saltine-type crackers or     · 3 cups of popcorn or ¾ ounce of pretzels     · Starchy vegetables and cooked legumes:      ? ½ cup of corn, green peas, sweet potatoes, or mashed potatoes     ? ¼ of a large baked potato     ? 1 cup of acorn, butternut squash, or pumpkin     ? ½ cup of beans, lentils, or peas (such as jackson, kidney, or black-eyed)    ?  ? cup of lima beans    Fruit group exchanges:  Each exchange contains about 15 grams of carbohydrate  and 60 calories  · 1 small (4 ounce) apple, banana orange, or nectarine    · ½ cup of canned or fresh fruit    · ½ cup (4 ounces) of unsweetened fruit juice    · 2 tablespoons of dried fruit    Milk group exchanges:  Each exchange contains about 12 grams of carbohydrate  and 8 grams of protein  The amount of fat and calories in each serving depends on the type of milk (such as whole, low-fat, or fat-free)  · 1 cup fat-free or low-fat milk    · ¾ cup of plain, nonfat yogurt    · 1 cup fat-free, flavored yogurt with artificial (no calorie) sweetener    Non-starchy vegetable group exchanges:  Each exchange contains about 5 grams of carbohydrate , 2 grams of protein, and 25 calories  Examples include beets, broccoli, cabbage, carrots, cauliflower, cucumber, mushrooms, tomatoes, and zucchini  · ½ cup of cooked vegetables or 1 cup of raw vegetables     · ½ cup of vegetable juice    Meat and meat substitute group exchanges:  Each exchange of a lean meat  listed below contains about 7 grams of protein, 0 to 3 grams of fat, and 45 calories  The meat and meat substitutes food group does not contain any carbohydrates  Medium and high-fat meats have more calories  · 1 ounce of chicken or turkey without skin, or 1 ounce of fish (not breaded or fried)     · 1 ounce of lean beef, pork, or lamb     · 1-inch cube or 1 ounce of low-fat cheese     · 2 egg whites or ¼ cup of egg substitute     · ½ cup of tofu    Sweets, desserts, and other carbohydrate group exchanges:   · Sweets and other desserts:  Each exchange has about 15 grams of carbohydrate   ? 1 ounce of kaur food cake or 2-inch square cake (unfrosted)    ? 2 small cookies     ? ½ cup of sugar-free, fat-free ice cream    ? 1 tablespoon of syrup, jam, jelly, table sugar, or honey    · Combination foods:     ? 1 cup of an entrée, such as lasagna, spaghetti with meatballs, macaroni and cheese, and chili with beans (each serving counts as 2 carbohydrate exchanges )     ?  1 cup of tomato or vegetable beef soup (each serving counts as 1 carbohydrate exchange )    Fat group exchanges:  Each exchange contains 5 grams of fat and 45 calories  · 1 teaspoon of oil (such as canola, olive, or corn oil)     · 6 almonds or cashews, 10 peanuts, or 4 pecan halves     · 2 tablespoons of avocado     · ½ tablespoon of peanut butter     · 1 teaspoon of regular margarine or 2 teaspoons of low-fat margarine     · 1 teaspoon of regular butter or 1 tablespoon of low-fat butter     · 1 teaspoon of regular mayonnaise or 1 tablespoon of low-fat mayonnaise     · 1 tablespoon of regular salad dressing or 2 tablespoons of low-fat salad dressing    Free foods: The foods on this list are called free foods because they have very few calories  Free foods usually do not increase your blood sugar if you limit them  · 1 tablespoon of catsup or taco sauce     · ¼ cup of salsa     · 2 tablespoons of sugar-free syrup or 2 teaspoons of light jam or jelly     · 1 tablespoon of fat-free salad dressing     · 4 tablespoons of fat-free margarine or fat-free mayonnaise     · Sugar-free drinks: diet soda, sugar-free drink mixes, or mineral water     · Low-sodium bouillon or fat-free broth     · Mustard     · Seasonings such as spices, herbs, and garlic     · Sugar-free gelatin without added fruit    Other healthy nutrition guidelines:   · Limit drinks with sugar substitutes  Your dietitian or healthcare provider will encourage you to drink water  Water helps your kidneys to function properly  Ask how much water you should drink every day  · Eat more fiber  Choose foods that are good sources of fiber, such as fruits, vegetables, and whole grains  Cereals that contain 5 or more grams of fiber per serving are good sources of fiber  Legumes such as garbanzo, jackson beans, kidney beans, and lentils are also good sources  · Limit fat  Ask your dietitian or healthcare provider how much fat you should eat each day   Choose foods low in fat, saturated fat, trans fat, and cholesterol  Examples include turkey or chicken without the skin, fish, lean cuts of meat, and beans  Low-fat dairy foods, such as low-fat or fat-free milk and low-fat yogurt are also good choices  Omega-3 fatty acids are healthy fats that are found in canola oil, soybean oil and fatty fish  [place]z, albacore tuna, and sardines are good sources of omega 3 fatty acids  Eat 2 servings of these types of fish each week  Do not eat fried fish  · Limit sugar  Sugar and sweets must be counted toward the carbohydrate exchanges that you can have within your meal plan  Limit sugar and sweets because they are usually also high in calories and fat  Eat smaller portions of sweets by sharing a dessert or asking for a child-size portion at a restaurant  · Limit sodium  (salt) to about 2,300 mg per day  You may need to eat even less sodium if you have certain medical conditions  Foods high in sodium include soy sauce, potato chips, and soup  · Limit alcohol  Ask your healthcare provider if it is safe for you to drink alcohol  If alcohol is safe for you to have, eat a meal when you drink alcohol  If you drink alcohol on an empty stomach, your blood sugar may drop to a low level  Women 21 years or older and men 72 years or older should limit alcohol to 1 drink a day  Men aged 24 to 59 years should limit alcohol to 2 drinks a day  A drink of alcohol is 5 ounces of wine, 12 ounces of beer, or 1½ ounces of liquor  Other ways to manage your diabetes:   · Control your blood sugar level  Test your blood sugar level regularly and keep a record of the results  Ask your healthcare provider when and how often to test your blood sugar  You may need to check your blood sugar level at least 3 times each day  · Talk to your healthcare provider about your weight  Ask if you need to lose weight, and how much you need to lose   If you are overweight, you may need to make other changes to lose weight  Ask your healthcare provider to help you create a weight loss program      · Get regular physical activity  Physical activity can help decrease your blood sugar level  It can also help to decrease your risk for heart disease and help you lose weight  Adults should have moderate intensity physical activity for at least 150 minutes every week  Spread the amount of activity over at least 3 days a week  Do not skip more than 2 days in a row  Children should get at least 60 minutes of moderate physical activity on most days of the week  Examples of moderate physical activity include brisk walking, running, and swimming  Do not sit for longer than 30 minutes  Work with your healthcare provider to create a plan for physical activity  © Copyright [facility] 2022 Information is for End User's use only and may not be sold, redistributed or otherwise used for commercial purposes  All illustrations and images included in CareNotes® are the copyrighted property of [employer]  or [facility] Billie Estevez   The above information is an  only  It is not intended as medical advice for individual conditions or treatments  Talk to your doctor, nurse or pharmacist before following any medical regimen to see if it is safe and effective for you

## 2022-03-09 NOTE — PROGRESS NOTES
Assessment and Plan:     Problem List Items Addressed This Visit     Cerebral palsy (HCC)    Hyperlipidemia    Relevant Medications    calcium carbonate (OYSTER SHELL,OSCAL) 500 mg    simvastatin (ZOCOR) 40 mg tablet    Other Relevant Orders    Lipid Panel with Direct LDL reflex    Acquired hypothyroidism    Relevant Orders    TSH, 3rd generation    T4, free    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HCC)    Thrombocytopathia (HCC)    Moderate protein-calorie malnutrition (HCC)    Pressure injury of left buttock, stage 4 (HCC)      Other Visit Diagnoses     Diabetes mellitus due to underlying condition with hyperosmolarity without coma, without long-term current use of insulin (HCC)    -  Primary    Relevant Medications    metFORMIN (GLUCOPHAGE) 1000 MG tablet    calcium carbonate (OYSTER SHELL,OSCAL) 500 mg    Other Relevant Orders    Hemoglobin A1C    POCT hemoglobin A1c (Completed)    Microalbumin / creatinine urine ratio    Urinary incontinence, unspecified type        Relevant Medications    Incontinence Supply Disposable (Briefs Overnight Medium) MISC    Generalized idiopathic epilepsy and epileptic syndromes, not intractable, without status epilepticus ([facility] )        Screening for malignant neoplasm of prostate        Relevant Orders    PSA, Total Screen           Preventive health issues were discussed with patient, and age appropriate screening tests were ordered as noted in patient's After Visit Summary  Personalized health advice and appropriate referrals for health education or preventive services given if needed, as noted in patient's After Visit Summary       History of Present Illness:     Patient presents for Medicare Annual Wellness visit    Patient Care Team:  Zohra Guerrero as PCP - General (Internal Medicine)  Cherise Solomon MD as Surgeon (General Surgery)     Problem List:     Patient Active Problem List   Diagnosis    Cataract    Cerebral palsy ([facility] )    Type 2 diabetes mellitus without complication, without long-term current use of insulin (HCC)    Generalized convulsive epilepsy ([facility] )    Hyperlipidemia    Essential hypertension    Acquired hypothyroidism    Osteoporosis    Scoliosis    Vitamin B12 deficiency    Vitamin D deficiency    Seborrheic dermatitis of scalp    Nearsightedness    Behavior concern in adult    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HCC)    Acute cystitis    Metabolic encephalopathy    Severe sepsis (HCC)    Hyponatremia    Thrombocytopathia (HCC)    Hypomagnesemia    Social problem    Buttock wound, left, subsequent encounter    Moderate protein-calorie malnutrition (HCC)    Pressure injury of left buttock, stage 4 (HCC)      Past Medical and Surgical History:     Past Medical History:   Diagnosis Date    [facility] 9/24/2019    Bacteremia due to Gram-positive bacteria 9/7/2021    [facility]     Depression     Diabetes ([facility])     Disease of thyroid gland     Gait disorder     Gluteal abscess 9/6/2021    Hyperlipidemia     Hypertension     Kidney failure     Kidney stones     Osteoporosis     Psychiatric disorder     Scoliosis     Seizures ([facility] )     Sepsis ([facility] ) 9/25/2019    Thyroid disease     UTI (urinary tract infection)      Past Surgical History:   Procedure Laterality Date    APPENDECTOMY      IR PICC PLACEMENT SINGLE LUMEN  9/13/2021    IR PICC PLACEMENT SINGLE LUMEN  9/16/2021      Family History:     History reviewed  No pertinent family history     Social History:     Social History     Socioeconomic History    Marital status: Single     Spouse name: None    Number of children: None    Years of education: None    Highest education level: None   Occupational History    None   Tobacco Use    Smoking status: Never Smoker    Smokeless tobacco: Never Used   Vaping Use    Vaping Use: Never used   Substance and Sexual Activity    Alcohol use: Never    Drug use: No    Sexual activity: Not Currently   Other Topics Concern    None   Social History Narrative    None     Social Determinants of Health     Financial Resource Strain: Not on file   Food Insecurity: No Food Insecurity    Worried About Running Out of Food in the Last Year: Never true    Melinda of Food in the Last Year: Never true   Transportation Needs: No Transportation Needs    Lack of Transportation (Medical): No    Lack of Transportation (Non-Medical):  No   Physical Activity: Not on file   Stress: Not on file   Social Connections: Not on file   Intimate Partner Violence: Not on file   Housing Stability: Low Risk     Unable to Pay for Housing in the Last Year: No    Number of Places Lived in the Last Year: 1    Unstable Housing in the Last Year: No      Medications and Allergies:     Current Outpatient Medications   Medication Sig Dispense Refill    benztropine (COGENTIN) 0 5 mg tablet TAKE 1 TABLET BY MOUTH TWICE A DAY (8AM,8PM) 56 tablet 4    calcium carbonate (OYSTER SHELL,OSCAL) 500 mg Take 1 tablet by mouth daily 90 tablet 1    cholecalciferol (VITAMIN D3) 1,000 units tablet Take 1 tablet (1,000 Units total) by mouth daily 90 tablet 1    clonazePAM (KlonoPIN) 0 25 MG disintegrating tablet Take 1 tablet (0 25 mg total) by mouth as needed for seizures (clusters of myoclonic jerking (more than 2 myoclonic jerks in a day)) for up to 1 dose 10 tablet 5    Depakote  MG 24 hr tablet TAKE 1 TABLET BY MOUTH IN THE MORNING *BRAND NECESSARY*;TAKE 2 TABLETS BY MOUTH AT BEDTIME 90 tablet 11    docusate sodium (COLACE) 100 mg capsule TAKE 1 CAPSULE BY MOUTH TWICE A DAY (8AM,8PM) (DX: CONSTIPATION) 56 capsule 4    gabapentin (NEURONTIN) 100 mg capsule Take 100 mg by mouth 3 (three) times a day       Insulin Pen Needle (PEN NEEDLES) 31G X 5 MM MISC by Does not apply route daily 100 each 5    lacosamide (Vimpat) 200 mg tablet Take 1 tablet (200 mg total) by mouth every 12 (twelve) hours 60 tablet 5    levETIRAcetam (KEPPRA) 750 mg tablet TAKE 2 TABLETS (1500MG) BY MOUTH TWICE A  tablet 10    levothyroxine 125 mcg tablet Take 1 tablet (125 mcg total) by mouth daily 90 tablet 1    lisinopril (ZESTRIL) 10 mg tablet Take 1 tablet (10 mg total) by mouth daily 90 tablet 1    metFORMIN (GLUCOPHAGE) 1000 MG tablet Take 1 tablet (1,000 mg total) by mouth 2 (two) times a day with meals 240 tablet 0    Multiple Vitamin (Daily-Reina) TABS Take 1 tablet by mouth daily Take at 8 AM 90 tablet 1    QUEtiapine (SEROquel XR) 200 mg 24 hr tablet TAKE 1 TABLET BY MOUTH TWICE A DAY (8AM,2PM) 16 tablet 10    QUEtiapine (SEROquel XR) 400 mg 24 hr tablet TAKE 1 TABLET BY MOUTH AT BEDTIME (8PM) (DX: ANTIPSYCHOTIC) 90 tablet 1    simvastatin (ZOCOR) 40 mg tablet Take 1 tablet (40 mg total) by mouth daily 90 tablet 1    temazepam (RESTORIL) 15 mg capsule Take 1 capsule (15 mg total) by mouth daily at bedtime as needed for sleep 30 capsule 1    vitamin B-12 (VITAMIN B-12) 1,000 mcg tablet TAKE 1 TABLET BY MOUTH EVERY OTHER DAY (8AM) (DX: DEFICIENCY OF OTHER SPECIFIED B GROUP VITAMINS) 14 tablet 4    Blood Glucose Monitoring Suppl (Marie Friedman) w/Device KIT by Does not apply route 3 (three) times a day TEST BLOOD SUGARS THREE TIMES (Patient not taking: Reported on 3/9/2022 ) 1 kit 0    Humidifiers (Cool Mist Humidifier 1 gallon) MISC Use daily at bedtime 1 each 0    hydrOXYzine pamoate (VISTARIL) 50 mg capsule TAKE 1 CAPSULE BY MOUTH 3 TIMES A DAY (Patient not taking: Reported on 3/9/2022) 84 capsule 10    Incontinence Supply Disposable (Briefs Overnight Medium) MISC Use in the morning 20 each 11    loratadine (CLARITIN) 10 mg tablet One tab daily as needed for allergy symptoms (Patient not taking: Reported on 3/9/2022 ) 90 tablet 0    OneTouch Delica Lancets 10U MISC by Does not apply route daily TEST BLOOD SUGARS THREE TIMES DAILY 100 each 2    polyethylene glycol (MIRALAX) 17 g packet Take 17 g by mouth daily (Patient not taking: Reported on 3/9/2022 ) 14 each 8    sodium chloride (OCEAN) 0 65 % nasal spray 1 spray into each nostril as needed for congestion 15 mL 1     No current facility-administered medications for this visit  Allergies   Allergen Reactions    Erythromycin Other (See Comments)     Unknown per pt    Penicillins Other (See Comments)     Unknown per pt      Immunizations:     Immunization History   Administered Date(s) Administered    COVID-19 J&J (earthmine) vaccine 0 5 mL 08/10/2021    INFLUENZA 12/09/2015    Influenza Quadrivalent Preservative Free 3 years and older IM 12/09/2015, 12/12/2017    Influenza, recombinant, quadrivalent,injectable, preservative free 12/13/2018, 11/19/2019, 10/15/2020, 11/03/2021    Influenza, seasonal, injectable 12/19/2016    Pneumococcal Polysaccharide PPV23 12/12/2017    Tdap 06/15/2017    Tuberculin Skin Test-PPD Intradermal 12/19/2016, 03/08/2017, 03/14/2019      Health Maintenance:         Topic Date Due    Hepatitis C Screening  Never done    HIV Screening  Never done    Colorectal Cancer Screening  Never done         Topic Date Due    COVID-19 Vaccine (2 - Booster for Compound Time series) 10/05/2021      Medicare Health Risk Assessment:     /80 (BP Location: Left arm, Patient Position: Sitting, Cuff Size: Standard)   Pulse 85   Temp (!) 97 4 °F (36 3 °C) (Tympanic)   Ht 6' (1 829 m)   Wt 68 kg (150 lb)   SpO2 96%   BMI 20 34 kg/m²          Historian  Patient cannot answer questions due to cognitive impairment, intelluctual disability, or expressive limitations  Information provided by: caregiver  Health Risk Assessment:   Patient rates overall health as fair  Patient feels that their physical health rating is slightly worse  Patient is satisfied with their life  Eyesight was rated as same  Hearing was rated as same  Patient feels that their emotional and mental health rating is same  Patients states they are never, rarely angry   Patient states they are never, rarely unusually tired/fatigued  Pain experienced in the last 7 days has been none  Patient states that he has experienced no weight loss or gain in last 6 months  Depression Screening:   PHQ-2 Score: 0      Fall Risk Screening: In the past year, patient has experienced: no history of falling in past year      Home Safety:  Patient has trouble with stairs inside or outside of their home  Patient has working smoke alarms and has working carbon monoxide detector  Home safety hazards include: none  Nutrition:   Current diet is Regular  Medications:   Patient is currently taking over-the-counter supplements  OTC medications include: see medication list  Patient is not able to manage medications  Activities of Daily Living (ADLs)/Instrumental Activities of Daily Living (IADLs):   Walk and transfer into and out of bed and chair?: Yes  Dress and groom yourself?: No    Bathe or shower yourself?: No    Feed yourself? Yes  Do your laundry/housekeeping?: No  Manage your money, pay your bills and track your expenses?: No  Make your own meals?: No    Do your own shopping?: No    ADL comments: With assistance    Previous Hospitalizations:   Any hospitalizations or ED visits within the last 12 months?: Yes    How many hospitalizations have you had in the last year?: 1-2    Advance Care Planning:   Living will: No    Durable POA for healthcare:  Yes    Advanced directive: No    Advanced directive counseling given: Yes    Five wishes given: Yes    Patient declined ACP directive: No    End of Life Decisions reviewed with patient: Yes    Provider agrees with end of life decisions: Yes      Comments: Sister, Ray Mas, is legal medical POA    Cognitive Screening:   Provider or family/friend/caregiver concerned regarding cognition?: Yes    PREVENTIVE SCREENINGS      Cardiovascular Screening:    General: Screening Not Indicated and History Lipid Disorder      Diabetes Screening:     General: Screening Not Indicated and History Diabetes      Prostate Cancer Screening:      Due for: PSA      Osteoporosis Screening:    General: Screening Not Indicated and History Osteoporosis      Abdominal Aortic Aneurysm (AAA) Screening:        General: Screening Not Indicated      Lung Cancer Screening:     General: Screening Not Indicated    Screening, Brief Intervention, and Referral to Treatment (SBIRT)    Screening      Single Item Drug Screening:  How often have you used an illegal drug (including marijuana) or a prescription medication for non-medical reasons in the past year? never    Single Item Drug Screen Score: 0  Interpretation: Negative screen for possible drug use disorder    Other Counseling Topics:   Car/seat belt/driving safety, skin self-exam, sunscreen and regular weightbearing exercise and calcium and vitamin D intake         Sharona Paris

## 2022-03-09 NOTE — PROGRESS NOTES
Assessment/Plan:    Problem List Items Addressed This Visit     Cerebral palsy (HCC)    Hyperlipidemia    Relevant Medications    calcium carbonate (OYSTER SHELL,OSCAL) 500 mg    simvastatin (ZOCOR) 40 mg tablet    Other Relevant Orders    Lipid Panel with Direct LDL reflex    Acquired hypothyroidism    Relevant Orders    TSH, 3rd generation    T4, free    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HCC)    Thrombocytopathia (HCC)    Moderate protein-calorie malnutrition (HCC)    Pressure injury of left buttock, stage 4 (HCC)      Other Visit Diagnoses     Diabetes mellitus due to underlying condition with hyperosmolarity without coma, without long-term current use of insulin (HCC)    -  Primary    Relevant Medications    metFORMIN (GLUCOPHAGE) 1000 MG tablet    calcium carbonate (OYSTER SHELL,OSCAL) 500 mg    Other Relevant Orders    Hemoglobin A1C    POCT hemoglobin A1c (Completed)    Microalbumin / creatinine urine ratio    Urinary incontinence, unspecified type        Relevant Medications    Incontinence Supply Disposable (Briefs Overnight Medium) MISC    Generalized idiopathic epilepsy and epileptic syndromes, not intractable, without status epilepticus ([facility] [address] )        Screening for malignant neoplasm of prostate        Relevant Orders    PSA, Total Screen           Diagnoses and all orders for this visit:    Diabetes mellitus due to underlying condition with hyperosmolarity without coma, without long-term current use of insulin (HCC)  -     Hemoglobin A1C; Future  -     POCT hemoglobin A1c  -     metFORMIN (GLUCOPHAGE) 1000 MG tablet; Take 1 tablet (1,000 mg total) by mouth 2 (two) times a day with meals  -     calcium carbonate (OYSTER SHELL,OSCAL) 500 mg; Take 1 tablet by mouth daily  -     Microalbumin / creatinine urine ratio;  Future    Urinary incontinence, unspecified type  -     Incontinence Supply Disposable (Briefs Overnight Medium) MISC; Use in the morning    Hyperlipidemia, unspecified hyperlipidemia type  -     calcium carbonate (OYSTER SHELL,OSCAL) 500 mg; Take 1 tablet by mouth daily  -     simvastatin (ZOCOR) 40 mg tablet; Take 1 tablet (40 mg total) by mouth daily  -     Lipid Panel with Direct LDL reflex; Future    Acquired hypothyroidism  -     TSH, 3rd generation; Future  -     T4, free; Future    Pressure injury of left buttock, stage 4 (HCC)  Comments:  wound RN assessing and dressing  caretaker reports wound is healing    Moderate protein-calorie malnutrition ([address] )    Cerebral palsy, unspecified type ([address] )    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[address] )    Generalized idiopathic epilepsy and epileptic syndromes, not intractable, without status epilepticus ([facility] [address] )    Thrombocytopathia ([facility] [address] )    Screening for malignant neoplasm of prostate  -     PSA, Total Screen; Future        No problem-specific Assessment & Plan notes found for this encounter  Subjective:      Patient ID: Ricky Pantoja is a 49 y o  male  Patient has medical history of hypertension her cerebral palsy, lymphedema, hypothyroidism, osteoporosis, stage 4 pressure ulcer left hip, homolateral ataxia, non-insulin-dependent type 2 diabetes, and osteoporosis presents today for an AWV in diabetic follow-up  Diabetes  He presents for his follow-up diabetic visit  He has type 2 diabetes mellitus  His disease course has been stable  There are no hypoglycemic associated symptoms  There are no diabetic associated symptoms  There are no hypoglycemic complications  Symptoms are stable  There are no diabetic complications  Risk factors for coronary artery disease include diabetes mellitus, hypertension and male sex  Current diabetic treatment includes oral agent (monotherapy)  He is compliant with treatment all of the time  He is following a generally healthy diet  Meal planning includes avoidance of concentrated sweets  An ACE inhibitor/angiotensin II receptor blocker is being taken  Thyroid Problem  Presents for follow-up visit   The symptoms have been stable  A1c noted   Lab Results   Component Value Date    HGBA1C 5 5 03/09/2022        Recommend lifestyle and dietary changes which include plant based low glycemic diet  Will monitor in 4  months  The following portions of the patient's history were reviewed and updated as appropriate:   He has a past medical history of ADRIANA (acute kidney injury) ([facility] ) (9/24/2019), Bacteremia due to Gram-positive bacteria (9/7/2021), CP (cerebral palsy) ([facility] ), Depression, Diabetes ([facility] ), Disease of thyroid gland, Gait disorder, Gluteal abscess (9/6/2021), Hyperlipidemia, Hypertension, Kidney failure, Kidney stones, Osteoporosis, Psychiatric disorder, Scoliosis, Seizures ([place] [address] ), Sepsis ([facility] ) (9/25/2019), Thyroid disease, and UTI (urinary tract infection)  ,  does not have any pertinent problems on file  ,   has a past surgical history that includes Appendectomy; IR PICC placement single lumen (9/13/2021); and IR PICC placement single lumen (9/16/2021)  ,  family history is not on file  ,   reports that he has never smoked  He has never used smokeless tobacco  He reports that he does not drink alcohol and does not use drugs  ,  is allergic to erythromycin and penicillins     Current Outpatient Medications   Medication Sig Dispense Refill    benztropine (COGENTIN) 0 5 mg tablet TAKE 1 TABLET BY MOUTH TWICE A DAY (8AM,8PM) 56 tablet 4    calcium carbonate (OYSTER SHELL,OSCAL) 500 mg Take 1 tablet by mouth daily 90 tablet 1    cholecalciferol (VITAMIN D3) 1,000 units tablet Take 1 tablet (1,000 Units total) by mouth daily 90 tablet 1    clonazePAM (KlonoPIN) 0 25 MG disintegrating tablet Take 1 tablet (0 25 mg total) by mouth as needed for seizures (clusters of myoclonic jerking (more than 2 myoclonic jerks in a day)) for up to 1 dose 10 tablet 5    Depakote  MG 24 hr tablet TAKE 1 TABLET BY MOUTH IN THE MORNING *BRAND NECESSARY*;TAKE 2 TABLETS BY MOUTH AT BEDTIME 90 tablet 11    docusate sodium (COLACE) 100 mg capsule TAKE 1 CAPSULE BY MOUTH TWICE A DAY (8AM,8PM) (DX: CONSTIPATION) 56 capsule 4    gabapentin (NEURONTIN) 100 mg capsule Take 100 mg by mouth 3 (three) times a day       Insulin Pen Needle (PEN NEEDLES) 31G X 5 MM MISC by Does not apply route daily 100 each 5    lacosamide (Vimpat) 200 mg tablet Take 1 tablet (200 mg total) by mouth every 12 (twelve) hours 60 tablet 5    levETIRAcetam (KEPPRA) 750 mg tablet TAKE 2 TABLETS (1500MG) BY MOUTH TWICE A  tablet 10    levothyroxine 125 mcg tablet Take 1 tablet (125 mcg total) by mouth daily 90 tablet 1    lisinopril (ZESTRIL) 10 mg tablet Take 1 tablet (10 mg total) by mouth daily 90 tablet 1    metFORMIN (GLUCOPHAGE) 1000 MG tablet Take 1 tablet (1,000 mg total) by mouth 2 (two) times a day with meals 240 tablet 0    Multiple Vitamin (Daily-Reina) TABS Take 1 tablet by mouth daily Take at 8 AM 90 tablet 1    QUEtiapine (SEROquel XR) 200 mg 24 hr tablet TAKE 1 TABLET BY MOUTH TWICE A DAY (8AM,2PM) 16 tablet 10    QUEtiapine (SEROquel XR) 400 mg 24 hr tablet TAKE 1 TABLET BY MOUTH AT BEDTIME (8PM) (DX: ANTIPSYCHOTIC) 90 tablet 1    simvastatin (ZOCOR) 40 mg tablet Take 1 tablet (40 mg total) by mouth daily 90 tablet 1    temazepam (RESTORIL) 15 mg capsule Take 1 capsule (15 mg total) by mouth daily at bedtime as needed for sleep 30 capsule 1    vitamin B-12 (VITAMIN B-12) 1,000 mcg tablet TAKE 1 TABLET BY MOUTH EVERY OTHER DAY (8AM) (DX: DEFICIENCY OF OTHER SPECIFIED B GROUP VITAMINS) 14 tablet 4    Blood Glucose Monitoring Suppl (Yesi Morales) w/Device KIT by Does not apply route 3 (three) times a day TEST BLOOD SUGARS THREE TIMES (Patient not taking: Reported on 3/9/2022 ) 1 kit 0    Humidifiers (Cool Mist Humidifier 1 gallon) MISC Use daily at bedtime 1 each 0    hydrOXYzine pamoate (VISTARIL) 50 mg capsule TAKE 1 CAPSULE BY MOUTH 3 TIMES A DAY (Patient not taking: Reported on 3/9/2022) 84 capsule 10    Incontinence Supply Disposable (Briefs Overnight Medium) MISC Use in the morning 20 each 11    loratadine (CLARITIN) 10 mg tablet One tab daily as needed for allergy symptoms (Patient not taking: Reported on 3/9/2022 ) 90 tablet 0    OneTouch Delica Lancets 69Q MISC by Does not apply route daily TEST BLOOD SUGARS THREE TIMES DAILY 100 each 2    polyethylene glycol (MIRALAX) 17 g packet Take 17 g by mouth daily (Patient not taking: Reported on 3/9/2022 ) 14 each 8    sodium chloride (OCEAN) 0 65 % nasal spray 1 spray into each nostril as needed for congestion 15 mL 1     No current facility-administered medications for this visit  Review of Systems   Unable to perform ROS: Other   Skin: Positive for wound  Objective:  Vitals:    03/09/22 1016   BP: 142/80   BP Location: Left arm   Patient Position: Sitting   Cuff Size: Standard   Pulse: 85   Temp: (!) 97 4 °F (36 3 °C)   TempSrc: Tympanic   SpO2: 96%   Weight: 68 kg (150 lb)   Height: 6' (1 829 m)     Body mass index is 20 34 kg/m²  Depression Screening and Follow-up Plan: Patient was screened for depression during today's encounter  They screened negative with a PHQ-2 score of 0  Physical Exam  Vitals and nursing note reviewed  Constitutional:       Appearance: Normal appearance  He is well-developed  Interventions: Face mask in place  HENT:      Head: Normocephalic and atraumatic  Right Ear: Tympanic membrane, ear canal and external ear normal       Left Ear: Tympanic membrane, ear canal and external ear normal       Nose: Nose normal       Mouth/Throat:      Mouth: Mucous membranes are moist       Pharynx: Uvula midline  Eyes:      General: Lids are normal       Conjunctiva/sclera: Conjunctivae normal       Pupils: Pupils are equal, round, and reactive to light  Neck:      Thyroid: No thyroid mass  Vascular: No JVD        Trachea: Trachea and phonation normal    Cardiovascular:      Rate and Rhythm: Normal rate and regular rhythm  Pulses: Normal pulses  Heart sounds: Normal heart sounds, S1 normal and S2 normal  No murmur heard  No friction rub  No gallop  Pulmonary:      Effort: Pulmonary effort is normal       Breath sounds: Normal breath sounds  Abdominal:      General: Bowel sounds are normal       Palpations: Abdomen is soft  Tenderness: There is no abdominal tenderness  Genitourinary:     Comments: Deferred  Musculoskeletal:         General: Normal range of motion  Cervical back: Full passive range of motion without pain, normal range of motion and neck supple  Right lower leg: No edema  Left lower leg: No edema  Lymphadenopathy:      Head:      Right side of head: No submental, submandibular, tonsillar, preauricular, posterior auricular or occipital adenopathy  Left side of head: No submental, submandibular, tonsillar, preauricular, posterior auricular or occipital adenopathy  Cervical: No cervical adenopathy  Skin:     General: Skin is warm and dry  Capillary Refill: Capillary refill takes less than 2 seconds  Neurological:      General: No focal deficit present  Mental Status: He is alert and oriented to person, place, and time  Cranial Nerves: Cranial nerves are intact  Sensory: Sensation is intact  Motor: Motor function is intact  Coordination: Coordination is intact  Gait: Gait is intact  Psychiatric:         Attention and Perception: Attention and perception normal          Mood and Affect: Mood and affect normal          Speech: Speech normal          Behavior: Behavior normal  Behavior is cooperative  Thought Content:  Thought content normal          Cognition and Memory: Cognition normal          Judgment: Judgment normal

## 2022-03-11 ENCOUNTER — TELEPHONE (OUTPATIENT)
Dept: OTHER | Facility: HOSPITAL | Age: 57
End: 2022-03-11

## 2022-03-11 NOTE — TELEPHONE ENCOUNTER
Attempted to contact patients caregiver and the voicemail is not set up  Left message for patients sister to give us a call back to schedule or give us an updated # for caregiver

## 2022-03-11 NOTE — TELEPHONE ENCOUNTER
Patients sister called back - she stated to reach out to caregiver, John Lopez again due to him living with her      Will try to contact caregiver again

## 2022-03-11 NOTE — TELEPHONE ENCOUNTER
Reviewed updated wound pictures from [facility], patient has heaped up granulation tissue, recommended silver dressings and appt in clinic to treat with silver nitrate  Please make an appt for the patient for next week, thanks

## 2022-03-17 NOTE — TELEPHONE ENCOUNTER
Called [facility] they are using silver dressings  Placed call to patient's number on file (family member) and left a message to return our call in regards to scheduling patient for a follow up visit with Dr Erin Roldan

## 2022-03-17 NOTE — TELEPHONE ENCOUNTER
Patient scheduled with Dr Jourdan Tamez on Monday  Dr Jourdan Tamez has been communicating with the visiting nurse in regards to wound

## 2022-03-21 ENCOUNTER — OFFICE VISIT (OUTPATIENT)
Dept: SURGERY | Facility: CLINIC | Age: 57
End: 2022-03-21
Payer: MEDICARE

## 2022-03-21 VITALS
TEMPERATURE: 98.9 F | OXYGEN SATURATION: 98 % | HEIGHT: 72 IN | SYSTOLIC BLOOD PRESSURE: 138 MMHG | HEART RATE: 104 BPM | BODY MASS INDEX: 20.37 KG/M2 | RESPIRATION RATE: 16 BRPM | WEIGHT: 150.4 LBS | DIASTOLIC BLOOD PRESSURE: 70 MMHG

## 2022-03-21 DIAGNOSIS — S31.829D BUTTOCK WOUND, LEFT, SUBSEQUENT ENCOUNTER: Primary | ICD-10-CM

## 2022-03-21 PROCEDURE — 99212 OFFICE O/P EST SF 10 MIN: CPT | Performed by: SURGERY

## 2022-03-21 NOTE — PROGRESS NOTES
Progress Note - General Surgery   Kimberly Fraser 49 y o  male MRN: [id number]  Encounter: [id number]    Assessment/Plan     56M with previous significant left buttock pressure related necrosis and associated abscess requiring wide debridement and drainage and prolonged hospitalization for sepsis  Now with recent hypergranulation tissue at the small residual wound site that has significantly improved with silver dressings  Wound area today is clean and pink without hypergranulation  Still needs final new skin layer to fill in  I treated the whole wound bed with silver nitrate today and replaced a silver dressing over it  Will see him in 1 month for a wound check  I updated his visiting nurse with today's findings  Subjective       Chief Complaint   Patient presents with    Wound Check      No fevers, cellulitis, skin irritation  Recent images from the [facility] showed hypergranulation tissue throughout the wound bed that was heaped up and raised  Switched to silver dressings at that time  Now much improved no overgrowth, remains flat and pink  No signs of infection  Review of Systems   Skin: Positive for wound  All other systems reviewed and are negative  The following portions of the patient's history were reviewed and updated as appropriate: allergies, current medications, past family history, past medical history, past social history, past surgical history and problem list     Objective      Blood pressure 138/70, pulse 104, temperature 98 9 °F (37 2 °C), temperature source Temporal, resp  rate 16, height 6' (1 829 m), weight 68 2 kg (150 lb 6 4 oz), SpO2 98 %  Physical Exam  Vitals reviewed  Constitutional:       General: He is not in acute distress  HENT:      Head: Normocephalic and atraumatic  Nose: Nose normal       Mouth/Throat:      Mouth: Mucous membranes are moist    Eyes:      Extraocular Movements: Extraocular movements intact        Pupils: Pupils are equal, round, and reactive to light  Cardiovascular:      Rate and Rhythm: Tachycardia present  Pulmonary:      Effort: Pulmonary effort is normal  No respiratory distress  Musculoskeletal:      Cervical back: Normal range of motion  Skin:     General: Skin is warm and dry  Comments: Approximately 2 x 2cm flat pink healthy granulating wound, no heaped up hypergranulation lesions as seen in prior images sent from the [facility]    Wound treated with silver nitrate and silver dressings replaced over the site    No surrounding cellulitis or skin irritation   Neurological:      General: No focal deficit present  Mental Status: He is alert  Mental status is at baseline  Psychiatric:         Mood and Affect: Mood normal          Behavior: Behavior normal          Signature:   Luanne Velasco MD  Date: 3/21/2022 Time: 12:50 PM

## 2022-03-22 ENCOUNTER — TELEPHONE (OUTPATIENT)
Dept: FAMILY MEDICINE CLINIC | Facility: CLINIC | Age: 57
End: 2022-03-22

## 2022-03-22 NOTE — TELEPHONE ENCOUNTER
Zachery Arriaga from [place] [address] [facility] called and stated that they are re-certifying the pt for an additional 60 days for wound care

## 2022-03-28 ENCOUNTER — APPOINTMENT (EMERGENCY)
Dept: CT IMAGING | Facility: HOSPITAL | Age: 57
DRG: 179 | End: 2022-03-28
Payer: MEDICARE

## 2022-03-28 ENCOUNTER — APPOINTMENT (EMERGENCY)
Dept: RADIOLOGY | Facility: HOSPITAL | Age: 57
DRG: 179 | End: 2022-03-28
Payer: MEDICARE

## 2022-03-28 ENCOUNTER — HOSPITAL ENCOUNTER (INPATIENT)
Facility: HOSPITAL | Age: 57
LOS: 1 days | Discharge: HOME | DRG: 179 | End: 2022-03-29
Attending: EMERGENCY MEDICINE | Admitting: INTERNAL MEDICINE
Payer: MEDICARE

## 2022-03-28 ENCOUNTER — TELEPHONE (OUTPATIENT)
Dept: FAMILY MEDICINE CLINIC | Facility: CLINIC | Age: 57
End: 2022-03-28

## 2022-03-28 DIAGNOSIS — E08.00 DIABETES MELLITUS DUE TO UNDERLYING CONDITION WITH HYPEROSMOLARITY WITHOUT COMA, WITHOUT LONG-TERM CURRENT USE OF INSULIN (HCC): ICD-10-CM

## 2022-03-28 DIAGNOSIS — F51.01 PRIMARY INSOMNIA: ICD-10-CM

## 2022-03-28 DIAGNOSIS — I10 HYPERTENSION, UNSPECIFIED TYPE: ICD-10-CM

## 2022-03-28 DIAGNOSIS — R25.9 INVOLUNTARY MOVEMENTS: ICD-10-CM

## 2022-03-28 DIAGNOSIS — E06.3 HYPOTHYROIDISM DUE TO HASHIMOTO'S THYROIDITIS: ICD-10-CM

## 2022-03-28 DIAGNOSIS — E53.8 B12 DEFICIENCY: ICD-10-CM

## 2022-03-28 DIAGNOSIS — E03.8 HYPOTHYROIDISM DUE TO HASHIMOTO'S THYROIDITIS: ICD-10-CM

## 2022-03-28 DIAGNOSIS — G23.2 PARKINSON'S PLUS SYNDROME (HCC): ICD-10-CM

## 2022-03-28 DIAGNOSIS — E55.9 VITAMIN D DEFICIENCY: ICD-10-CM

## 2022-03-28 DIAGNOSIS — R56.9 SEIZURE (HCC): ICD-10-CM

## 2022-03-28 DIAGNOSIS — F69 BEHAVIOR CONCERN IN ADULT: ICD-10-CM

## 2022-03-28 DIAGNOSIS — E78.5 HYPERLIPIDEMIA, UNSPECIFIED HYPERLIPIDEMIA TYPE: ICD-10-CM

## 2022-03-28 DIAGNOSIS — G40.309 GENERALIZED CONVULSIVE EPILEPSY (HCC): ICD-10-CM

## 2022-03-28 DIAGNOSIS — U07.1 COVID-19: Primary | ICD-10-CM

## 2022-03-28 DIAGNOSIS — G62.9 NEUROPATHY: ICD-10-CM

## 2022-03-28 LAB
ALBUMIN SERPL BCP-MCNC: 3.7 G/DL (ref 3.5–5)
ALP SERPL-CCNC: 42 U/L (ref 34–104)
ALT SERPL W P-5'-P-CCNC: 7 U/L (ref 7–52)
ANION GAP SERPL CALCULATED.3IONS-SCNC: 7 MMOL/L (ref 4–13)
AST SERPL W P-5'-P-CCNC: 15 U/L (ref 13–39)
BASOPHILS # BLD AUTO: 0.09 THOUSANDS/ΜL (ref 0–0.1)
BASOPHILS NFR BLD AUTO: 1 % (ref 0–1)
BILIRUB SERPL-MCNC: 0.25 MG/DL (ref 0.2–1)
BUN SERPL-MCNC: 14 MG/DL (ref 5–25)
CALCIUM SERPL-MCNC: 9.7 MG/DL (ref 8.4–10.2)
CARDIAC TROPONIN I PNL SERPL HS: 4 NG/L
CHLORIDE SERPL-SCNC: 99 MMOL/L (ref 96–108)
CO2 SERPL-SCNC: 30 MMOL/L (ref 21–32)
CREAT SERPL-MCNC: 0.52 MG/DL (ref 0.6–1.3)
EOSINOPHIL # BLD AUTO: 0.75 THOUSAND/ΜL (ref 0–0.61)
EOSINOPHIL NFR BLD AUTO: 9 % (ref 0–6)
ERYTHROCYTE [DISTWIDTH] IN BLOOD BY AUTOMATED COUNT: 14.3 % (ref 11.6–15.1)
FLUAV RNA RESP QL NAA+PROBE: NEGATIVE
FLUBV RNA RESP QL NAA+PROBE: NEGATIVE
GFR SERPL CREATININE-BSD FRML MDRD: 120 ML/MIN/1.73SQ M
GLUCOSE SERPL-MCNC: 74 MG/DL (ref 65–140)
HCT VFR BLD AUTO: 38 % (ref 36.5–49.3)
HGB BLD-MCNC: 12.2 G/DL (ref 12–17)
IMM GRANULOCYTES # BLD AUTO: 0.04 THOUSAND/UL (ref 0–0.2)
IMM GRANULOCYTES NFR BLD AUTO: 1 % (ref 0–2)
LYMPHOCYTES # BLD AUTO: 2.96 THOUSANDS/ΜL (ref 0.6–4.47)
LYMPHOCYTES NFR BLD AUTO: 34 % (ref 14–44)
MCH RBC QN AUTO: 31 PG (ref 26.8–34.3)
MCHC RBC AUTO-ENTMCNC: 32.1 G/DL (ref 31.4–37.4)
MCV RBC AUTO: 96 FL (ref 82–98)
MONOCYTES # BLD AUTO: 1.24 THOUSAND/ΜL (ref 0.17–1.22)
MONOCYTES NFR BLD AUTO: 14 % (ref 4–12)
NEUTROPHILS # BLD AUTO: 3.75 THOUSANDS/ΜL (ref 1.85–7.62)
NEUTS SEG NFR BLD AUTO: 41 % (ref 43–75)
NRBC BLD AUTO-RTO: 0 /100 WBCS
PLATELET # BLD AUTO: 179 THOUSANDS/UL (ref 149–390)
PMV BLD AUTO: 11.5 FL (ref 8.9–12.7)
POTASSIUM SERPL-SCNC: 4.5 MMOL/L (ref 3.5–5.3)
PROT SERPL-MCNC: 7.2 G/DL (ref 6.4–8.4)
RBC # BLD AUTO: 3.94 MILLION/UL (ref 3.88–5.62)
RSV RNA RESP QL NAA+PROBE: NEGATIVE
SARS-COV-2 RNA RESP QL NAA+PROBE: POSITIVE
SODIUM SERPL-SCNC: 136 MMOL/L (ref 135–147)
TSH SERPL DL<=0.05 MIU/L-ACNC: 3.79 UIU/ML (ref 0.45–5.33)
WBC # BLD AUTO: 8.83 THOUSAND/UL (ref 4.31–10.16)

## 2022-03-28 PROCEDURE — 74177 CT ABD & PELVIS W/CONTRAST: CPT

## 2022-03-28 PROCEDURE — 80053 COMPREHEN METABOLIC PANEL: CPT | Performed by: EMERGENCY MEDICINE

## 2022-03-28 PROCEDURE — 0241U HB NFCT DS VIR RESP RNA 4 TRGT: CPT | Performed by: EMERGENCY MEDICINE

## 2022-03-28 PROCEDURE — 71045 X-RAY EXAM CHEST 1 VIEW: CPT

## 2022-03-28 PROCEDURE — 84484 ASSAY OF TROPONIN QUANT: CPT | Performed by: EMERGENCY MEDICINE

## 2022-03-28 PROCEDURE — 36415 COLL VENOUS BLD VENIPUNCTURE: CPT | Performed by: EMERGENCY MEDICINE

## 2022-03-28 PROCEDURE — 84443 ASSAY THYROID STIM HORMONE: CPT | Performed by: EMERGENCY MEDICINE

## 2022-03-28 PROCEDURE — 93005 ELECTROCARDIOGRAM TRACING: CPT

## 2022-03-28 PROCEDURE — 71260 CT THORAX DX C+: CPT

## 2022-03-28 PROCEDURE — 99285 EMERGENCY DEPT VISIT HI MDM: CPT

## 2022-03-28 PROCEDURE — 99285 EMERGENCY DEPT VISIT HI MDM: CPT | Performed by: EMERGENCY MEDICINE

## 2022-03-28 PROCEDURE — 70450 CT HEAD/BRAIN W/O DYE: CPT

## 2022-03-28 PROCEDURE — 85025 COMPLETE CBC W/AUTO DIFF WBC: CPT | Performed by: EMERGENCY MEDICINE

## 2022-03-28 PROCEDURE — 99223 1ST HOSP IP/OBS HIGH 75: CPT | Performed by: NURSE PRACTITIONER

## 2022-03-28 RX ORDER — MELATONIN
1000 DAILY
Qty: 90 TABLET | Refills: 1 | Status: SHIPPED | OUTPATIENT
Start: 2022-03-28

## 2022-03-28 RX ORDER — LANOLIN ALCOHOL/MO/W.PET/CERES
1000 CREAM (GRAM) TOPICAL DAILY
Qty: 90 TABLET | Refills: 1 | Status: SHIPPED | OUTPATIENT
Start: 2022-03-28

## 2022-03-28 RX ORDER — LISINOPRIL 10 MG/1
10 TABLET ORAL DAILY
Qty: 90 TABLET | Refills: 1 | Status: SHIPPED | OUTPATIENT
Start: 2022-03-28

## 2022-03-28 RX ORDER — QUETIAPINE 400 MG/1
TABLET, FILM COATED, EXTENDED RELEASE ORAL
Qty: 90 TABLET | Refills: 1 | Status: SHIPPED | OUTPATIENT
Start: 2022-03-28

## 2022-03-28 RX ORDER — LEVOTHYROXINE SODIUM 0.12 MG/1
125 TABLET ORAL DAILY
Qty: 90 TABLET | Refills: 1 | Status: SHIPPED | OUTPATIENT
Start: 2022-03-28

## 2022-03-28 RX ORDER — BENZTROPINE MESYLATE 0.5 MG/1
0.5 TABLET ORAL 2 TIMES DAILY
Qty: 180 TABLET | Refills: 1 | OUTPATIENT
Start: 2022-03-28

## 2022-03-28 RX ORDER — TEMAZEPAM 15 MG/1
15 CAPSULE ORAL
Qty: 30 CAPSULE | Refills: 1 | Status: SHIPPED | OUTPATIENT
Start: 2022-03-28

## 2022-03-28 RX ORDER — CLONAZEPAM 0.25 MG/1
0.25 TABLET, ORALLY DISINTEGRATING ORAL AS NEEDED
Qty: 10 TABLET | Refills: 5 | OUTPATIENT
Start: 2022-03-28

## 2022-03-28 RX ORDER — CALCIUM CARBONATE 500(1250)
1 TABLET ORAL DAILY
Qty: 90 TABLET | Refills: 1 | Status: SHIPPED | OUTPATIENT
Start: 2022-03-28

## 2022-03-28 RX ORDER — MULTIVITAMIN WITH FOLIC ACID 400 MCG
1 TABLET ORAL DAILY
Qty: 90 TABLET | Refills: 1 | Status: SHIPPED | OUTPATIENT
Start: 2022-03-28

## 2022-03-28 RX ORDER — DIVALPROEX SODIUM 500 MG
TABLET, EXTENDED RELEASE 24 HR ORAL
Qty: 90 TABLET | Refills: 11 | OUTPATIENT
Start: 2022-03-28

## 2022-03-28 RX ORDER — LACOSAMIDE 200 MG/1
200 TABLET ORAL EVERY 12 HOURS SCHEDULED
Qty: 60 TABLET | Refills: 5 | OUTPATIENT
Start: 2022-03-28

## 2022-03-28 RX ORDER — SIMVASTATIN 40 MG
40 TABLET ORAL DAILY
Qty: 90 TABLET | Refills: 1 | Status: SHIPPED | OUTPATIENT
Start: 2022-03-28

## 2022-03-28 RX ORDER — GABAPENTIN 100 MG/1
100 CAPSULE ORAL 3 TIMES DAILY
Qty: 270 CAPSULE | Refills: 1 | OUTPATIENT
Start: 2022-03-28

## 2022-03-28 RX ORDER — QUETIAPINE 200 MG/1
TABLET, FILM COATED, EXTENDED RELEASE ORAL
Qty: 16 TABLET | Refills: 10 | Status: SHIPPED | OUTPATIENT
Start: 2022-03-28

## 2022-03-28 RX ORDER — LEVETIRACETAM 750 MG/1
1500 TABLET ORAL 2 TIMES DAILY
Qty: 118 TABLET | Refills: 5 | OUTPATIENT
Start: 2022-03-28

## 2022-03-28 RX ADMIN — IOHEXOL 100 ML: 350 INJECTION, SOLUTION INTRAVENOUS at 16:31

## 2022-03-28 RX ADMIN — SODIUM CHLORIDE 1000 ML: 0.9 INJECTION, SOLUTION INTRAVENOUS at 15:44

## 2022-03-28 NOTE — ED PROVIDER NOTES
History  Chief Complaint   Patient presents with    Altered Mental Status     Patient has history of mh/mr  Caregiver picked patient last night around 6 pm and had an unsteady gait  Patient caregiver also had confusion but baseline is confused  This is a 53-year-old with apparent alteration mental status  Patient is unaccompanied and is unable to relate any history  He has MR documented in the chart and apparently lives at a nursing/group home  Prior to Admission Medications   Prescriptions Last Dose Informant Patient Reported? Taking?    Blood Glucose Monitoring Suppl (Mitzi Ye) w/Device KIT  Care Giver No No   Sig: by Does not apply route 3 (three) times a day TEST BLOOD SUGARS THREE TIMES   Patient not taking: Reported on 3/9/2022    Depakote  MG 24 hr tablet  Care Giver No No   Sig: TAKE 1 TABLET BY MOUTH IN THE MORNING *BRAND NECESSARY*;TAKE 2 TABLETS BY MOUTH AT BEDTIME   Humidifiers (Cool Mist Humidifier 1 gallon) MISC  Care Giver No No   Sig: Use daily at bedtime   Incontinence Supply Disposable (Briefs Overnight Medium) MISC  Care Giver No No   Sig: Use in the morning   Insulin Pen Needle (PEN NEEDLES) 31G X 5 MM MISC  Care Giver No No   Sig: by Does not apply route daily   Multiple Vitamin (Daily-Reina) TABS   No No   Sig: Take 1 tablet by mouth daily Take at 8 AM   OneTouch Delica Lancets 84N MISC  Care Giver No No   Sig: by Does not apply route daily TEST BLOOD SUGARS THREE TIMES DAILY   QUEtiapine (SEROquel XR) 200 mg 24 hr tablet   No No   Sig: TAKE 1 TABLET BY MOUTH TWICE A DAY (8AM,2PM)   QUEtiapine (SEROquel XR) 400 mg 24 hr tablet   No No   Sig: TAKE 1 TABLET BY MOUTH AT BEDTIME (8PM) (DX: ANTIPSYCHOTIC)   benztropine (COGENTIN) 0 5 mg tablet  Care Giver No No   Sig: TAKE 1 TABLET BY MOUTH TWICE A DAY (8AM,8PM)   calcium carbonate (OYSTER SHELL,OSCAL) 500 mg   No No   Sig: Take 1 tablet by mouth daily   cholecalciferol (VITAMIN D3) 1,000 units tablet   No No   Sig: Take 1 tablet (1,000 Units total) by mouth daily   clonazePAM (KlonoPIN) 0 25 MG disintegrating tablet  Care Giver No No   Sig: Take 1 tablet (0 25 mg total) by mouth as needed for seizures (clusters of myoclonic jerking (more than 2 myoclonic jerks in a day)) for up to 1 dose   docusate sodium (COLACE) 100 mg capsule  Care Giver No No   Sig: TAKE 1 CAPSULE BY MOUTH TWICE A DAY (8AM,8PM) (DX: CONSTIPATION)   gabapentin (NEURONTIN) 100 mg capsule  Care Giver Yes No   Sig: Take 100 mg by mouth 3 (three) times a day    hydrOXYzine pamoate (VISTARIL) 50 mg capsule  Care Giver No No   Sig: TAKE 1 CAPSULE BY MOUTH 3 TIMES A DAY   Patient not taking: Reported on 3/9/2022   lacosamide (Vimpat) 200 mg tablet  Care Giver No No   Sig: Take 1 tablet (200 mg total) by mouth every 12 (twelve) hours   levETIRAcetam (KEPPRA) 750 mg tablet  Care Giver No No   Sig: TAKE 2 TABLETS (1500MG) BY MOUTH TWICE A DAY   levothyroxine 125 mcg tablet   No No   Sig: Take 1 tablet (125 mcg total) by mouth daily   lisinopril (ZESTRIL) 10 mg tablet   No No   Sig: Take 1 tablet (10 mg total) by mouth daily   loratadine (CLARITIN) 10 mg tablet  Care Giver No No   Sig: One tab daily as needed for allergy symptoms   Patient not taking: Reported on 3/9/2022    metFORMIN (GLUCOPHAGE) 1000 MG tablet   No No   Sig: Take 1 tablet (1,000 mg total) by mouth 2 (two) times a day with meals   polyethylene glycol (MIRALAX) 17 g packet  Care Giver No No   Sig: Take 17 g by mouth daily   simvastatin (ZOCOR) 40 mg tablet   No No   Sig: Take 1 tablet (40 mg total) by mouth daily   sodium chloride (OCEAN) 0 65 % nasal spray   No No   Si spray into each nostril as needed for congestion   temazepam (RESTORIL) 15 mg capsule   No No   Sig: Take 1 capsule (15 mg total) by mouth daily at bedtime as needed for sleep   vitamin B-12 (VITAMIN B-12) 1,000 mcg tablet   No No   Sig: Take 1 tablet (1,000 mcg total) by mouth daily      Facility-Administered Medications: None       Past Medical History:   Diagnosis Date    ADRIANA (acute kidney injury) ([place] [address] ) 9/24/2019    Bacteremia due to Gram-positive bacteria 9/7/2021    [facility]     Depression     Diabetes ([place] [address] )     Disease of thyroid gland     Gait disorder     Gluteal abscess 9/6/2021    Hyperlipidemia     Hypertension     Kidney failure     Kidney stones     Osteoporosis     Psychiatric disorder     Scoliosis     Seizures ([place] [address] )     Sepsis ([place] [address] ) 9/25/2019    Thyroid disease     UTI (urinary tract infection)        Past Surgical History:   Procedure Laterality Date    APPENDECTOMY      IR PICC PLACEMENT SINGLE LUMEN  9/13/2021    IR PICC PLACEMENT SINGLE LUMEN  9/16/2021       History reviewed  No pertinent family history  I have reviewed and agree with the history as documented  E-Cigarette/Vaping    E-Cigarette Use Never User      E-Cigarette/Vaping Substances    Nicotine No     THC No     CBD No     Flavoring No     Other No     Unknown No      Social History     Tobacco Use    Smoking status: Never Smoker    Smokeless tobacco: Never Used   Vaping Use    Vaping Use: Never used   Substance Use Topics    Alcohol use: Never    Drug use: No       Review of Systems   Unable to perform ROS: Other       Physical Exam  Physical Exam  Constitutional:       General: He is not in acute distress  Appearance: Normal appearance  He is not ill-appearing, toxic-appearing or diaphoretic  HENT:      Head: Normocephalic and atraumatic  Right Ear: External ear normal       Left Ear: External ear normal    Eyes:      Extraocular Movements: Extraocular movements intact  Conjunctiva/sclera: Conjunctivae normal       Pupils: Pupils are equal, round, and reactive to light  Cardiovascular:      Comments: Good peripheral perfusion, good coloration  Pulmonary:      Effort: Pulmonary effort is normal    Abdominal:      General: There is no distension  Tenderness: There is abdominal tenderness  There is no guarding  Musculoskeletal:         General: No swelling, deformity or signs of injury  Normal range of motion  Cervical back: Normal range of motion and neck supple  Skin:     General: Skin is warm  Findings: No bruising or lesion  Comments: Skin breakdown on sacrum appears to be healing well, no signs of infection   Neurological:      General: No focal deficit present  Mental Status: He is alert  GCS: GCS eye subscore is 4  GCS verbal subscore is 4  GCS motor subscore is 6  Cranial Nerves: No facial asymmetry  Psychiatric:         Mood and Affect: Mood normal          Behavior: Behavior normal          Thought Content:  Thought content normal          Judgment: Judgment normal          Vital Signs  ED Triage Vitals [03/28/22 1506]   Temperature Pulse Respirations Blood Pressure SpO2   98 1 °F (36 7 °C) 84 16 135/81 99 %      Temp Source Heart Rate Source Patient Position - Orthostatic VS BP Location FiO2 (%)   Tympanic Monitor Lying Right arm --      Pain Score       No Pain           Vitals:    03/28/22 1506   BP: 135/81   Pulse: 84   Patient Position - Orthostatic VS: Lying         Visual Acuity  Visual Acuity      Most Recent Value   L Pupil Size (mm) 3   R Pupil Size (mm) 3          ED Medications  Medications   sodium chloride 0 9 % bolus 1,000 mL (1,000 mL Intravenous New Bag 3/28/22 1544)   iohexol (OMNIPAQUE) 350 MG/ML injection (SINGLE-DOSE) 100 mL (100 mL Intravenous Given 3/28/22 1631)       Diagnostic Studies  Results Reviewed     Procedure Component Value Units Date/Time    COVID/FLU/RSV [[id number]]  (Abnormal) Collected: 03/28/22 1531    Lab Status: Final result Specimen: Nares from Nose Updated: 03/28/22 1618     SARS-CoV-2 Positive     INFLUENZA A PCR Negative     INFLUENZA B PCR Negative     RSV PCR Negative    Narrative:      FOR PEDIATRIC PATIENTS - copy/paste COVID Guidelines URL to browser: [email or web address] [email or web address]    SARS-CoV-2 assay is a Nucleic Acid Amplification assay intended for the  qualitative detection of nucleic acid from SARS-CoV-2 in nasopharyngeal  swabs  Results are for the presumptive identification of SARS-CoV-2 RNA  Positive results are indicative of infection with SARS-CoV-2, the virus  causing COVID-19, but do not rule out bacterial infection or co-infection  with other viruses  Laboratories within the [place] and its  territories are required to report all positive results to the appropriate  public health authorities  Negative results do not preclude SARS-CoV-2  infection and should not be used as the sole basis for treatment or other  patient management decisions  Negative results must be combined with  clinical observations, patient history, and epidemiological information  This test has not been FDA cleared or approved  This test has been authorized by FDA under an Emergency Use Authorization  (EUA)  This test is only authorized for the duration of time the  declaration that circumstances exist justifying the authorization of the  emergency use of an in vitro diagnostic tests for detection of SARS-CoV-2  virus and/or diagnosis of COVID-19 infection under section 564(b)(1) of  the Act, 21 U  S C  885LEM-6(M)(7), unless the authorization is terminated  or revoked sooner  The test has been validated but independent review by FDA  and CLIA is pending  Test performed using [employer] Gene[employer]pert: This RT-PCR assay targets N2,  a region unique to SARS-CoV-2  A conserved region in the E-gene was chosen  for pan-Sarbecovirus detection which includes SARS-CoV-2      TSH [[id number]]  (Normal) Collected: 03/28/22 1531    Lab Status: Final result Specimen: Blood from Arm, Right Updated: 03/28/22 1615     TSH 3RD GENERATON 3 788 uIU/mL     Narrative:      Patients undergoing fluorescein dye angiography may retain small amounts of fluorescein in the body for 48-72 hours post procedure  Samples containing fluorescein can produce falsely depressed TSH values  If the patient had this procedure,a specimen should be resubmitted post fluorescein clearance        HS Troponin 0hr (reflex protocol) [[id number]]  (Normal) Collected: 03/28/22 1531    Lab Status: Final result Specimen: Blood from Arm, Right Updated: 03/28/22 1607     hs TnI 0hr 4 ng/L     HS Troponin I 2hr [[id number]]     Lab Status: No result Specimen: Blood     HS Troponin I 4hr [[id number]]     Lab Status: No result Specimen: Blood     Comprehensive metabolic panel [[id number]]  (Abnormal) Collected: 03/28/22 1531    Lab Status: Final result Specimen: Blood from Arm, Right Updated: 03/28/22 1602     Sodium 136 mmol/L      Potassium 4 5 mmol/L      Chloride 99 mmol/L      CO2 30 mmol/L      ANION GAP 7 mmol/L      BUN 14 mg/dL      Creatinine 0 52 mg/dL      Glucose 74 mg/dL      Calcium 9 7 mg/dL      AST 15 U/L      ALT 7 U/L      Alkaline Phosphatase 42 U/L      Total Protein 7 2 g/dL      Albumin 3 7 g/dL      Total Bilirubin 0 25 mg/dL      eGFR 120 ml/min/1 73sq m     Narrative:      [place] guidelines for Chronic Kidney Disease (CKD):     Stage 1 with normal or high GFR (GFR > 90 mL/min/1 73 square meters)    Stage 2 Mild CKD (GFR = 60-89 mL/min/1 73 square meters)    Stage 3A Moderate CKD (GFR = 45-59 mL/min/1 73 square meters)    Stage 3B Moderate CKD (GFR = 30-44 mL/min/1 73 square meters)    Stage 4 Severe CKD (GFR = 15-29 mL/min/1 73 square meters)    Stage 5 End Stage CKD (GFR <15 mL/min/1 73 square meters)  Note: GFR calculation is accurate only with a steady state creatinine    CBC and differential [[id number]]  (Abnormal) Collected: 03/28/22 1531    Lab Status: Final result Specimen: Blood from Arm, Right Updated: 03/28/22 1538     WBC 8 83 Thousand/uL      RBC 3 94 Million/uL      Hemoglobin 12 2 g/dL      Hematocrit 38 0 %      MCV 96 fL MCH 31 0 pg      MCHC 32 1 g/dL      RDW 14 3 %      MPV 11 5 fL      Platelets 689 Thousands/uL      nRBC 0 /100 WBCs      Neutrophils Relative 41 %      Immat GRANS % 1 %      Lymphocytes Relative 34 %      Monocytes Relative 14 %      Eosinophils Relative 9 %      Basophils Relative 1 %      Neutrophils Absolute 3 75 Thousands/µL      Immature Grans Absolute 0 04 Thousand/uL      Lymphocytes Absolute 2 96 Thousands/µL      Monocytes Absolute 1 24 Thousand/µL      Eosinophils Absolute 0 75 Thousand/µL      Basophils Absolute 0 09 Thousands/µL     UA w Reflex to Microscopic w Reflex to Culture [[id number]]     Lab Status: No result Specimen: Urine                  CT chest abdomen pelvis w contrast   Final Result by Gretchen Sharma MD (03/28 1701)      No acute abnormality in chest, abdomen, or pelvis  Focal skin thickening along posterior aspect of left gluteal region, markedly improved since 9/5/2021  Recommend direct visualization for further evaluation  0 7 cm nonobstructing renal calculus in left kidney  Additional chronic/incidental findings as detailed above  The study was marked in [facility] for immediate notification  Workstation performed: [id number]         CT head without contrast   Final Result by Palmira Chatman MD (03/28 1546)      No acute intracranial abnormality  Mild scalp swelling in the right parietal vertex likely from recent trauma  Workstation performed: [id number]         XR chest 1 view portable    (Results Pending)              Procedures  Procedures         ED Course  ED Course as of 03/28/22 1901   Mon Mar 28, 2022   1710 Attempted to call all listed phone numbers of contacts with no success  SBIRT 20yo+      Most Recent Value   SBIRT (22 yo +)    In order to provide better care to our patients, we are screening all of our patients for alcohol and drug use   Would it be okay to ask you these screening questions? Yes Filed at: 03/28/2022 1613   Initial Alcohol Screen: US AUDIT-C     1  How often do you have a drink containing alcohol? 0 Filed at: 03/28/2022 1613   2  How many drinks containing alcohol do you have on a typical day you are drinking? 0 Filed at: 03/28/2022 1613   3a  Male UNDER 65: How often do you have five or more drinks on one occasion? 0 Filed at: 03/28/2022 1613   3b  FEMALE Any Age, or MALE 65+: How often do you have 4 or more drinks on one occassion? 0 Filed at: 03/28/2022 1613   Audit-C Score 0 Filed at: 03/28/2022 1613   ADRIAN: How many times in the past year have you    Used an illegal drug or used a prescription medication for non-medical reasons? Never Filed at: 03/28/2022 1613                    MDM  Number of Diagnoses or Management Options  COVID-19: new and requires workup  Diagnosis management comments:  Completion of workup demonstrates COVID-19  At the time of patient results returning the caretaker arrived  Discussed with her plan of care  She was not confident that she would be able to take care of him at home due to his difficulty with ambulation, decreased p o  etc  When I informed her that he had COVID-19 she was upset and said "the devil is a liar "  I was then rebuked in the name of leodan for making that diagnosis  Patient stable at the time of acceptance to the medical service         Amount and/or Complexity of Data Reviewed  Clinical lab tests: ordered and reviewed  Tests in the radiology section of CPT®: ordered and reviewed  Discuss the patient with other providers: yes  Independent visualization of images, tracings, or specimens: yes        Disposition  Final diagnoses:   COVID-19     Time reflects when diagnosis was documented in both MDM as applicable and the Disposition within this note     Time User Action Codes Description Comment    3/28/2022  5:30 PM Cynda Letters Add [U07 1] COVID-19       ED Disposition     ED Disposition Condition Date/Time Comment Admit Stable Mon Mar 28, 2022  5:30 PM Case was discussed with cara and the patient's admission status was agreed to be Admission Status: inpatient status to the service of Dr Kings Dexter   Follow-up Information    None         Patient's Medications   Discharge Prescriptions    No medications on file       No discharge procedures on file      PDMP Review       Value Time User    PDMP Reviewed  Yes 2/10/2022  8:30 AM [address] [place] [address],[address]          ED Provider  Electronically Signed by           Lorna Adamson MD  03/28/22 4077

## 2022-03-28 NOTE — H&P
Nikkie 47  H&P- Siva Wylie 1965, 49 y o  male MRN: [id number]  Unit/Bed#: ED 01 Encounter: [id number]  Primary Care Provider: HUNTER Rosas   Date and time admitted to hospital: 3/28/2022  3:02 PM    COVID-19 virus infection  Assessment & Plan  · Presented for unsteady gait  · COVID positive  · Asymptomatic, afebrile  · 99% on room air  · CT chest abdomen pelvis: No acute abnormality in chest, abdomen, or pelvis   · Caregiver concerned that she will not be able to take care of him, ER physician requested admission    Behavior concern in adult  Assessment & Plan  · Continue home medications Seroquel  · Supportive care    Acquired hypothyroidism  Assessment & Plan  · Continue levothyroxine    Essential hypertension  Assessment & Plan  · BP reviewed  · Continue lisinopril  · Monitor blood pressure    Generalized convulsive epilepsy ([facility] )  Assessment & Plan  · Seizure precautions  · Continue home medications including Keppra and Depakote  · May use clonazepam for clusters of myoclonic jerking-disintegrating tablet is non formulary  · Continue outpatient follow-up    Type 2 diabetes mellitus without complication, without long-term current use of insulin ([facility] )  Assessment & Plan  Lab Results   Component Value Date    HGBA1C 5 5 03/09/2022       No results for input(s): POCGLU in the last 72 hours  Blood Sugar Average: Last 72 hrs:     · Diabetic diet  · Fingerstick blood sugar sliding scale coverage  · Hold home metformin      Cerebral palsy (HCC)  Assessment & Plan  · At baseline  · Lives in group home  · Supportive care    VTE Prophylaxis: Heparin   Code Status:  Fullfull  POLST: POLST form is not discussed and not completed at this time  Discussion with family:  caregiver    Anticipated Length of Stay:  Patient will be admitted on an Inpatient basis with an anticipated length of stay of  Greater than 2 midnights     Justification for Hospital Stay: per plan above    Total Time for Visit, including Counseling / Coordination of Care: 30 minutes  Greater than 50% of this total time spent on direct patient counseling and coordination of care  Chief Complaint:   Unsteady gait    History of Present Illness:    Severo Malay is a 49 y o  male with history of non-insulin-dependent type 2 diabetes, essential hypertension, cerebral palsy, behavioral disturbance, acquired hypothyroidism, sepsis, seizure disorder, UTI who presents with unsteady gait  He has an unsteady gait at baseline, however his caregiver felt that this had increased  He was found to be covid positive  He is asymptomatic and 99% on room air  His caregiver who is at bedside during admission assessment does not feel that she can take care of him due to the increased unsteady gait  Patient is essentially nonverbal and communicates in one-word answers or gestures  He denies any pain or discomfort  Review of Systems:    Review of Systems   Unable to perform ROS: Patient nonverbal       Past Medical and Surgical History:     Past Medical History:   Diagnosis Date    ADRIANA (acute kidney injury) ([facility] ) 9/24/2019    Bacteremia due to Gram-positive bacteria 9/7/2021    [facility]     Depression     Diabetes ([facility] )     Disease of thyroid gland     Gait disorder     Gluteal abscess 9/6/2021    Hyperlipidemia     Hypertension     Kidney failure     Kidney stones     Osteoporosis     Psychiatric disorder     Scoliosis     Seizures ([facility] )     Sepsis ([facility] ) 9/25/2019    Thyroid disease     UTI (urinary tract infection)        Past Surgical History:   Procedure Laterality Date    APPENDECTOMY      IR PICC PLACEMENT SINGLE LUMEN  9/13/2021    IR PICC PLACEMENT SINGLE LUMEN  9/16/2021       Meds/Allergies:    Prior to Admission medications    Medication Sig Start Date End Date Taking?  Authorizing Provider   benztropine (COGENTIN) 0 5 mg tablet TAKE 1 TABLET BY MOUTH TWICE A DAY (8AM,8PM) 7/15/21 Janneth Wren PA-C   Blood Glucose Monitoring Suppl (Keron Edward) w/Device KIT by Does not apply route 3 (three) times a day TEST BLOOD SUGARS THREE TIMES  Patient not taking: Reported on 3/9/2022  7/31/20   Mare Lennox, CRNP   calcium carbonate (OYSTER SHELL,OSCAL) 500 mg Take 1 tablet by mouth daily 3/28/22   HUNTER Garcia   cholecalciferol (VITAMIN D3) 1,000 units tablet Take 1 tablet (1,000 Units total) by mouth daily 3/28/22   HUNTER Garcia   clonazePAM (KlonoPIN) 0 25 MG disintegrating tablet Take 1 tablet (0 25 mg total) by mouth as needed for seizures (clusters of myoclonic jerking (more than 2 myoclonic jerks in a day)) for up to 1 dose 2/18/22   Jim Fish MD   Depakote  MG 24 hr tablet TAKE 1 TABLET BY MOUTH IN THE MORNING *BRAND NECESSARY*;TAKE 2 TABLETS BY MOUTH AT BEDTIME 2/18/22   Jim Fish MD   docusate sodium (COLACE) 100 mg capsule TAKE 1 CAPSULE BY MOUTH TWICE A DAY (8AM,8PM) (DX: CONSTIPATION) 10/5/21   Navneet Boyd PA-C   gabapentin (NEURONTIN) 100 mg capsule Take 100 mg by mouth 3 (three) times a day  6/26/20   Historical Provider, MD   Humidifiers (Cool Mist Humidifier 1 gallon) MISC Use daily at bedtime 11/10/20 3/21/22  Mare Lennox, CRNP   hydrOXYzine pamoate (VISTARIL) 50 mg capsule TAKE 1 CAPSULE BY MOUTH 3 TIMES A DAY  Patient not taking: Reported on 3/9/2022 3/25/20   Mare Lennox, CRNP   Incontinence Supply Disposable (Briefs Overnight Medium) MISC Use in the morning 3/9/22   HUNTER Garcia   Insulin Pen Needle (PEN NEEDLES) 31G X 5 MM MISC by Does not apply route daily 7/13/20   Mare Lennox, CRNP   lacosamide (Vimpat) 200 mg tablet Take 1 tablet (200 mg total) by mouth every 12 (twelve) hours 2/18/22   Jim Fish MD   levETIRAcetam (KEPPRA) 750 mg tablet TAKE 2 TABLETS (1500MG) BY MOUTH TWICE A DAY 12/3/21   Kade Smith MD   levothyroxine 125 mcg tablet Take 1 tablet (125 mcg total) by mouth daily 3/28/22 Janjaxson Carry, HUNTER   lisinopril (ZESTRIL) 10 mg tablet Take 1 tablet (10 mg total) by mouth daily 3/28/22   Jannis Carry, HUNTER   loratadine (CLARITIN) 10 mg tablet One tab daily as needed for allergy symptoms  Patient not taking: Reported on 3/9/2022  10/29/20   Ian Parra PA-C   metFORMIN (GLUCOPHAGE) 1000 MG tablet Take 1 tablet (1,000 mg total) by mouth 2 (two) times a day with meals 3/28/22   Janjaxson Hafsa, HUNTER   Multiple Vitamin (Daily-Reina) TABS Take 1 tablet by mouth daily Take at 8 AM 3/28/22   Janjaxson Hafsa, HUNTER   OneTouch Delica Lancets 95R MISC by Does not apply route daily TEST BLOOD SUGARS THREE TIMES DAILY 7/31/20   HUNTER Flores   polyethylene glycol (MIRALAX) 17 g packet Take 17 g by mouth daily 6/18/20   HUNTER Flores   QUEtiapine (SEROquel XR) 200 mg 24 hr tablet TAKE 1 TABLET BY MOUTH TWICE A DAY (8AM,2PM) 3/28/22   Janjaxson Carry, HUNTER   QUEtiapine (SEROquel XR) 400 mg 24 hr tablet TAKE 1 TABLET BY MOUTH AT BEDTIME (8PM) (DX: ANTIPSYCHOTIC) 3/28/22   Janjaxson Pereyra, HUNTER   simvastatin (ZOCOR) 40 mg tablet Take 1 tablet (40 mg total) by mouth daily 3/28/22   Janjaxson Pereyra, HUNTER   sodium chloride (OCEAN) 0 65 % nasal spray 1 spray into each nostril as needed for congestion 11/10/20 9/27/21  HUNTER Flores   temazepam (RESTORIL) 15 mg capsule Take 1 capsule (15 mg total) by mouth daily at bedtime as needed for sleep 3/28/22   Janjaxson Pereyra, HUNTER   vitamin B-12 (VITAMIN B-12) 1,000 mcg tablet Take 1 tablet (1,000 mcg total) by mouth daily 3/28/22   Janjaxson Pereyra, HUNTER   calcium carbonate (OYSTER SHELL,OSCAL) 500 mg Take 1 tablet by mouth daily 3/9/22 3/28/22  Guero Pereyra, HUNTER   cholecalciferol (VITAMIN D3) 1,000 units tablet Take 1 tablet (1,000 Units total) by mouth daily 2/9/22 3/28/22  HUNTER Bello   levothyroxine 125 mcg tablet Take 1 tablet (125 mcg total) by mouth daily 12/17/21 3/28/22  HUNTER Bello   lisinopril (ZESTRIL) 10 mg tablet Take 1 tablet (10 mg total) by mouth daily 2/9/22 3/28/22  [facility]   metFORMIN (GLUCOPHAGE) 1000 MG tablet Take 1 tablet (1,000 mg total) by mouth 2 (two) times a day with meals 3/9/22 3/28/22  [facility] HUN[facility] Nguyen   Multiple Vitamin (Daily-Reina) TABS Take 1 tablet by mouth daily Take at 8 AM 12/17/21 3/28/22  [facility]   QUEtiapine (SEROquel XR) 200 mg 24 hr tablet TAKE 1 TABLET BY MOUTH TWICE A DAY (8AM,2PM) 8/26/19 3/28/22  HUNTER Leonardo   QUEtiapine (SEROquel XR) 400 mg 24 hr tablet TAKE 1 TABLET BY MOUTH AT BEDTIME (8PM) (DX: ANTIPSYCHOTIC) 11/3/21 3/28/22  [facility] HUNTER Nguyen   simvastatin (ZOCOR) 40 mg tablet Take 1 tablet (40 mg total) by mouth daily 3/9/22 3/28/22  [facility]   temazepam (RESTORIL) 15 mg capsule Take 1 capsule (15 mg total) by mouth daily at bedtime as needed for sleep 11/3/21 3/28/22  [facility] HUNTER Nguyen   vitamin B-12 (VITAMIN B-12) 1,000 mcg tablet TAKE 1 TABLET BY MOUTH EVERY OTHER DAY (8AM) (DX: DEFICIENCY OF OTHER SPECIFIED B GROUP VITAMINS) 2/15/22 3/28/22  [facility] gomez [facility]     I have reviewed home medications with patient family member  Allergies:    Allergies   Allergen Reactions    Erythromycin Other (See Comments)     Unknown per pt    Penicillins Other (See Comments)     Unknown per pt       Social History:     Marital Status: Single   Occupation:  Disabled  Patient Pre-hospital Living Situation:  Group home  Patient Pre-hospital Level of Mobility:  Usually independent, at times requires assist  Patient Pre-hospital Diet Restrictions:  Diabetic  Substance Use History:   Social History     Substance and Sexual Activity   Alcohol Use Never     Social History     Tobacco Use   Smoking Status Never Smoker   Smokeless Tobacco Never Used     Social History     Substance and Sexual Activity   Drug Use No       Family History:    non-contributory    Physical Exam:     Vitals:   Blood Pressure: 141/92 (03/29/22 0600)  Pulse: 69 (03/29/22 0600)  Temperature: 98 1 °F (36 7 °C) (03/28/22 1506)  Temp Source: Tympanic (03/28/22 1506)  Respirations: 16 (03/28/22 2330)  Weight - Scale: 68 kg (150 lb) (03/29/22 0801)  SpO2: 95 % (03/29/22 0600)    Physical Exam  Vitals and nursing note reviewed  HENT:      Head: Normocephalic and atraumatic  Mouth/Throat:      Pharynx: Oropharynx is clear  Comments: Edentulous  Eyes:      Pupils: Pupils are equal, round, and reactive to light  Cardiovascular:      Rate and Rhythm: Normal rate  Pulmonary:      Effort: Pulmonary effort is normal  No respiratory distress  Breath sounds: Normal breath sounds  Abdominal:      General: Bowel sounds are normal       Palpations: Abdomen is soft  Tenderness: There is no abdominal tenderness  Musculoskeletal:      Cervical back: Neck supple  Skin:     General: Skin is warm and dry  Capillary Refill: Capillary refill takes less than 2 seconds  Neurological:      General: No focal deficit present  Mental Status: He is alert  Additional Data:     Lab Results: I have personally reviewed pertinent reports  Results from last 7 days   Lab Units 03/29/22  0628   WBC Thousand/uL 9 06   HEMOGLOBIN g/dL 12 0   HEMATOCRIT % 36 4*   PLATELETS Thousands/uL 182   NEUTROS PCT % 46   LYMPHS PCT % 31   MONOS PCT % 15*   EOS PCT % 7*     Results from last 7 days   Lab Units 03/29/22  0628 03/28/22  1531 03/28/22  1531   SODIUM mmol/L 140   < > 136   POTASSIUM mmol/L 3 7   < > 4 5   CHLORIDE mmol/L 102   < > 99   CO2 mmol/L 30   < > 30   BUN mg/dL 14   < > 14   CREATININE mg/dL 0 58*   < > 0 52*   ANION GAP mmol/L 8   < > 7   CALCIUM mg/dL 10 0   < > 9 7   ALBUMIN g/dL  --   --  3 7   TOTAL BILIRUBIN mg/dL  --   --  0 25   ALK PHOS U/L  --   --  42   ALT U/L  --   --  7   AST U/L  --   --  15   GLUCOSE RANDOM mg/dL 90   < > 74    < > = values in this interval not displayed                         Imaging: I have personally reviewed pertinent reports  CT chest abdomen pelvis w contrast   Final Result by Lea Fraser MD (03/28 1701)      No acute abnormality in chest, abdomen, or pelvis  Focal skin thickening along posterior aspect of left gluteal region, markedly improved since 9/5/2021  Recommend direct visualization for further evaluation  0 7 cm nonobstructing renal calculus in left kidney  Additional chronic/incidental findings as detailed above  The study was marked in [facility] for immediate notification  Workstation performed: [id number]         CT head without contrast   Final Result by Christa Choi MD (03/28 2446)      No acute intracranial abnormality  Mild scalp swelling in the right parietal vertex likely from recent trauma  Workstation performed: [id number]         XR chest 1 view portable   Final Result by Susy Mckenna MD (03/29 4142)      No acute cardiopulmonary disease  Workstation performed: [id number]             Allscripts / Epic Records Reviewed: Yes     ** Please Note: This note has been constructed using a voice recognition system   **

## 2022-03-28 NOTE — Clinical Note
Case was discussed with cara and the patient's admission status was agreed to be Admission Status: inpatient status to the service of Dr Akbar Nuñez

## 2022-03-28 NOTE — TELEPHONE ENCOUNTER
rita Leung called patient eyes are blood shot patient isnt himself and just not with it           Patients caregiver will be calling the ambulance to take him to the ER

## 2022-03-28 NOTE — ASSESSMENT & PLAN NOTE
· Presented for unsteady gait  · COVID positive  · Asymptomatic, afebrile  · 99% on room air  · CT chest abdomen pelvis: No acute abnormality in chest, abdomen, or pelvis   · Caregiver concerned that she will not be able to take care of him, ER physician requested admission

## 2022-03-29 ENCOUNTER — HOSPITAL ENCOUNTER (EMERGENCY)
Facility: HOSPITAL | Age: 57
Discharge: HOME | End: 2022-03-29
Attending: EMERGENCY MEDICINE
Payer: MEDICARE

## 2022-03-29 VITALS
DIASTOLIC BLOOD PRESSURE: 68 MMHG | RESPIRATION RATE: 20 BRPM | SYSTOLIC BLOOD PRESSURE: 138 MMHG | TEMPERATURE: 98.2 F | HEART RATE: 72 BPM | WEIGHT: 150 LBS | OXYGEN SATURATION: 100 % | BODY MASS INDEX: 20.34 KG/M2

## 2022-03-29 VITALS
HEART RATE: 82 BPM | RESPIRATION RATE: 18 BRPM | DIASTOLIC BLOOD PRESSURE: 71 MMHG | OXYGEN SATURATION: 98 % | SYSTOLIC BLOOD PRESSURE: 128 MMHG | TEMPERATURE: 98.1 F

## 2022-03-29 DIAGNOSIS — G40.309 GENERALIZED CONVULSIVE EPILEPSY (HCC): ICD-10-CM

## 2022-03-29 DIAGNOSIS — R25.9 INVOLUNTARY MOVEMENTS: ICD-10-CM

## 2022-03-29 DIAGNOSIS — G40.919 BREAKTHROUGH SEIZURE (HCC): Primary | ICD-10-CM

## 2022-03-29 PROBLEM — R26.2 AMBULATORY DYSFUNCTION: Status: ACTIVE | Noted: 2022-03-29

## 2022-03-29 LAB
ALBUMIN SERPL BCP-MCNC: 3.8 G/DL (ref 3.5–5)
ALP SERPL-CCNC: 43 U/L (ref 34–104)
ALT SERPL W P-5'-P-CCNC: 5 U/L (ref 7–52)
ANION GAP SERPL CALCULATED.3IONS-SCNC: 8 MMOL/L (ref 4–13)
ANION GAP SERPL CALCULATED.3IONS-SCNC: 8 MMOL/L (ref 4–13)
APTT PPP: 32 SECONDS (ref 23–37)
AST SERPL W P-5'-P-CCNC: 13 U/L (ref 13–39)
ATRIAL RATE: 81 BPM
BASOPHILS # BLD AUTO: 0.06 THOUSANDS/ΜL (ref 0–0.1)
BASOPHILS # BLD AUTO: 0.07 THOUSANDS/ΜL (ref 0–0.1)
BASOPHILS NFR BLD AUTO: 1 % (ref 0–1)
BASOPHILS NFR BLD AUTO: 1 % (ref 0–1)
BILIRUB SERPL-MCNC: 0.26 MG/DL (ref 0.2–1)
BILIRUB UR QL STRIP: NEGATIVE
BUN SERPL-MCNC: 13 MG/DL (ref 5–25)
BUN SERPL-MCNC: 14 MG/DL (ref 5–25)
CALCIUM SERPL-MCNC: 10 MG/DL (ref 8.4–10.2)
CALCIUM SERPL-MCNC: 9.7 MG/DL (ref 8.4–10.2)
CHLORIDE SERPL-SCNC: 102 MMOL/L (ref 96–108)
CHLORIDE SERPL-SCNC: 97 MMOL/L (ref 96–108)
CLARITY UR: CLEAR
CO2 SERPL-SCNC: 30 MMOL/L (ref 21–32)
CO2 SERPL-SCNC: 31 MMOL/L (ref 21–32)
COLOR UR: YELLOW
CREAT SERPL-MCNC: 0.58 MG/DL (ref 0.6–1.3)
CREAT SERPL-MCNC: 0.58 MG/DL (ref 0.6–1.3)
EOSINOPHIL # BLD AUTO: 0.34 THOUSAND/ΜL (ref 0–0.61)
EOSINOPHIL # BLD AUTO: 0.66 THOUSAND/ΜL (ref 0–0.61)
EOSINOPHIL NFR BLD AUTO: 3 % (ref 0–6)
EOSINOPHIL NFR BLD AUTO: 7 % (ref 0–6)
ERYTHROCYTE [DISTWIDTH] IN BLOOD BY AUTOMATED COUNT: 13.9 % (ref 11.6–15.1)
ERYTHROCYTE [DISTWIDTH] IN BLOOD BY AUTOMATED COUNT: 14.1 % (ref 11.6–15.1)
GFR SERPL CREATININE-BSD FRML MDRD: 115 ML/MIN/1.73SQ M
GFR SERPL CREATININE-BSD FRML MDRD: 115 ML/MIN/1.73SQ M
GLUCOSE SERPL-MCNC: 105 MG/DL (ref 65–140)
GLUCOSE SERPL-MCNC: 112 MG/DL (ref 65–140)
GLUCOSE SERPL-MCNC: 90 MG/DL (ref 65–140)
GLUCOSE UR STRIP-MCNC: NEGATIVE MG/DL
HCT VFR BLD AUTO: 35 % (ref 36.5–49.3)
HCT VFR BLD AUTO: 36.4 % (ref 36.5–49.3)
HGB BLD-MCNC: 11.9 G/DL (ref 12–17)
HGB BLD-MCNC: 12 G/DL (ref 12–17)
HGB UR QL STRIP.AUTO: NEGATIVE
IMM GRANULOCYTES # BLD AUTO: 0.03 THOUSAND/UL (ref 0–0.2)
IMM GRANULOCYTES # BLD AUTO: 0.03 THOUSAND/UL (ref 0–0.2)
IMM GRANULOCYTES NFR BLD AUTO: 0 % (ref 0–2)
IMM GRANULOCYTES NFR BLD AUTO: 0 % (ref 0–2)
INR PPP: 0.93 (ref 0.84–1.19)
KETONES UR STRIP-MCNC: NEGATIVE MG/DL
LEUKOCYTE ESTERASE UR QL STRIP: NEGATIVE
LYMPHOCYTES # BLD AUTO: 2.13 THOUSANDS/ΜL (ref 0.6–4.47)
LYMPHOCYTES # BLD AUTO: 2.77 THOUSANDS/ΜL (ref 0.6–4.47)
LYMPHOCYTES NFR BLD AUTO: 20 % (ref 14–44)
LYMPHOCYTES NFR BLD AUTO: 31 % (ref 14–44)
MCH RBC QN AUTO: 31.2 PG (ref 26.8–34.3)
MCH RBC QN AUTO: 31.6 PG (ref 26.8–34.3)
MCHC RBC AUTO-ENTMCNC: 33 G/DL (ref 31.4–37.4)
MCHC RBC AUTO-ENTMCNC: 34 G/DL (ref 31.4–37.4)
MCV RBC AUTO: 93 FL (ref 82–98)
MCV RBC AUTO: 95 FL (ref 82–98)
MONOCYTES # BLD AUTO: 1.31 THOUSAND/ΜL (ref 0.17–1.22)
MONOCYTES # BLD AUTO: 1.65 THOUSAND/ΜL (ref 0.17–1.22)
MONOCYTES NFR BLD AUTO: 15 % (ref 4–12)
MONOCYTES NFR BLD AUTO: 15 % (ref 4–12)
NEUTROPHILS # BLD AUTO: 4.22 THOUSANDS/ΜL (ref 1.85–7.62)
NEUTROPHILS # BLD AUTO: 6.6 THOUSANDS/ΜL (ref 1.85–7.62)
NEUTS SEG NFR BLD AUTO: 46 % (ref 43–75)
NEUTS SEG NFR BLD AUTO: 61 % (ref 43–75)
NITRITE UR QL STRIP: NEGATIVE
NRBC BLD AUTO-RTO: 0 /100 WBCS
NRBC BLD AUTO-RTO: 0 /100 WBCS
P AXIS: -89 DEGREES
PH UR STRIP.AUTO: 7 [PH]
PLATELET # BLD AUTO: 180 THOUSANDS/UL (ref 149–390)
PLATELET # BLD AUTO: 182 THOUSANDS/UL (ref 149–390)
PMV BLD AUTO: 11.2 FL (ref 8.9–12.7)
PMV BLD AUTO: 11.3 FL (ref 8.9–12.7)
POTASSIUM SERPL-SCNC: 3.7 MMOL/L (ref 3.5–5.3)
POTASSIUM SERPL-SCNC: 3.9 MMOL/L (ref 3.5–5.3)
PR INTERVAL: 134 MS
PROT SERPL-MCNC: 7.3 G/DL (ref 6.4–8.4)
PROT UR STRIP-MCNC: NEGATIVE MG/DL
PROTHROMBIN TIME: 12.4 SECONDS (ref 11.6–14.5)
QRS AXIS: 60 DEGREES
QRSD INTERVAL: 146 MS
QT INTERVAL: 400 MS
QTC INTERVAL: 464 MS
RBC # BLD AUTO: 3.76 MILLION/UL (ref 3.88–5.62)
RBC # BLD AUTO: 3.85 MILLION/UL (ref 3.88–5.62)
SODIUM SERPL-SCNC: 136 MMOL/L (ref 135–147)
SODIUM SERPL-SCNC: 140 MMOL/L (ref 135–147)
SP GR UR STRIP.AUTO: 1.01 (ref 1–1.03)
T WAVE AXIS: 14 DEGREES
UROBILINOGEN UR QL STRIP.AUTO: 0.2 E.U./DL
VENTRICULAR RATE: 81 BPM
WBC # BLD AUTO: 10.81 THOUSAND/UL (ref 4.31–10.16)
WBC # BLD AUTO: 9.06 THOUSAND/UL (ref 4.31–10.16)

## 2022-03-29 PROCEDURE — 97163 PT EVAL HIGH COMPLEX 45 MIN: CPT

## 2022-03-29 PROCEDURE — 99284 EMERGENCY DEPT VISIT MOD MDM: CPT

## 2022-03-29 PROCEDURE — 85025 COMPLETE CBC W/AUTO DIFF WBC: CPT | Performed by: EMERGENCY MEDICINE

## 2022-03-29 PROCEDURE — 93010 ELECTROCARDIOGRAM REPORT: CPT | Performed by: INTERNAL MEDICINE

## 2022-03-29 PROCEDURE — 85610 PROTHROMBIN TIME: CPT | Performed by: EMERGENCY MEDICINE

## 2022-03-29 PROCEDURE — 85025 COMPLETE CBC W/AUTO DIFF WBC: CPT | Performed by: NURSE PRACTITIONER

## 2022-03-29 PROCEDURE — 80048 BASIC METABOLIC PNL TOTAL CA: CPT | Performed by: NURSE PRACTITIONER

## 2022-03-29 PROCEDURE — 81003 URINALYSIS AUTO W/O SCOPE: CPT | Performed by: EMERGENCY MEDICINE

## 2022-03-29 PROCEDURE — 82948 REAGENT STRIP/BLOOD GLUCOSE: CPT

## 2022-03-29 PROCEDURE — 99284 EMERGENCY DEPT VISIT MOD MDM: CPT | Performed by: EMERGENCY MEDICINE

## 2022-03-29 PROCEDURE — 99239 HOSP IP/OBS DSCHRG MGMT >30: CPT | Performed by: INTERNAL MEDICINE

## 2022-03-29 PROCEDURE — 80053 COMPREHEN METABOLIC PANEL: CPT | Performed by: EMERGENCY MEDICINE

## 2022-03-29 PROCEDURE — 85730 THROMBOPLASTIN TIME PARTIAL: CPT | Performed by: EMERGENCY MEDICINE

## 2022-03-29 PROCEDURE — 96365 THER/PROPH/DIAG IV INF INIT: CPT

## 2022-03-29 PROCEDURE — 36415 COLL VENOUS BLD VENIPUNCTURE: CPT | Performed by: NURSE PRACTITIONER

## 2022-03-29 RX ORDER — BENZTROPINE MESYLATE 0.5 MG/1
0.5 TABLET ORAL 2 TIMES DAILY
Status: DISCONTINUED | OUTPATIENT
Start: 2022-03-29 | End: 2022-03-29 | Stop reason: HOSPADM

## 2022-03-29 RX ORDER — GABAPENTIN 100 MG/1
100 CAPSULE ORAL 3 TIMES DAILY
Status: DISCONTINUED | OUTPATIENT
Start: 2022-03-29 | End: 2022-03-29 | Stop reason: HOSPADM

## 2022-03-29 RX ORDER — LISINOPRIL 10 MG/1
10 TABLET ORAL DAILY
Status: DISCONTINUED | OUTPATIENT
Start: 2022-03-29 | End: 2022-03-29 | Stop reason: HOSPADM

## 2022-03-29 RX ORDER — LEVETIRACETAM 500 MG/1
1500 TABLET ORAL EVERY 12 HOURS SCHEDULED
Status: DISCONTINUED | OUTPATIENT
Start: 2022-03-29 | End: 2022-03-29 | Stop reason: HOSPADM

## 2022-03-29 RX ORDER — POLYETHYLENE GLYCOL 3350 17 G/17G
17 POWDER, FOR SOLUTION ORAL DAILY
Status: DISCONTINUED | OUTPATIENT
Start: 2022-03-29 | End: 2022-03-29 | Stop reason: HOSPADM

## 2022-03-29 RX ORDER — DIVALPROEX SODIUM 500 MG/1
1000 TABLET, EXTENDED RELEASE ORAL
Status: DISCONTINUED | OUTPATIENT
Start: 2022-03-29 | End: 2022-03-29 | Stop reason: HOSPADM

## 2022-03-29 RX ORDER — HEPARIN SODIUM 5000 [USP'U]/ML
5000 INJECTION, SOLUTION INTRAVENOUS; SUBCUTANEOUS EVERY 8 HOURS SCHEDULED
Status: DISCONTINUED | OUTPATIENT
Start: 2022-03-29 | End: 2022-03-29 | Stop reason: HOSPADM

## 2022-03-29 RX ORDER — DOCUSATE SODIUM 100 MG/1
100 CAPSULE, LIQUID FILLED ORAL 2 TIMES DAILY
Status: DISCONTINUED | OUTPATIENT
Start: 2022-03-29 | End: 2022-03-29 | Stop reason: HOSPADM

## 2022-03-29 RX ORDER — PRAVASTATIN SODIUM 20 MG
80 TABLET ORAL
Status: DISCONTINUED | OUTPATIENT
Start: 2022-03-29 | End: 2022-03-29 | Stop reason: HOSPADM

## 2022-03-29 RX ORDER — DIVALPROEX SODIUM 500 MG/1
500 TABLET, EXTENDED RELEASE ORAL DAILY
Status: DISCONTINUED | OUTPATIENT
Start: 2022-03-29 | End: 2022-03-29 | Stop reason: HOSPADM

## 2022-03-29 RX ADMIN — LEVOTHYROXINE SODIUM 125 MCG: 25 TABLET ORAL at 09:05

## 2022-03-29 RX ADMIN — LEVETIRACETAM 1500 MG: 500 TABLET, FILM COATED ORAL at 09:05

## 2022-03-29 RX ADMIN — LISINOPRIL 10 MG: 10 TABLET ORAL at 09:06

## 2022-03-29 RX ADMIN — BENZTROPINE MESYLATE 0.5 MG: 0.5 TABLET ORAL at 09:06

## 2022-03-29 RX ADMIN — DOCUSATE SODIUM 100 MG: 100 CAPSULE, LIQUID FILLED ORAL at 09:06

## 2022-03-29 RX ADMIN — QUETIAPINE FUMARATE 200 MG: 50 TABLET, EXTENDED RELEASE ORAL at 09:04

## 2022-03-29 RX ADMIN — GABAPENTIN 100 MG: 100 CAPSULE ORAL at 09:05

## 2022-03-29 RX ADMIN — LEVETIRACETAM 1000 MG: 100 INJECTION, SOLUTION INTRAVENOUS at 22:48

## 2022-03-29 RX ADMIN — DIVALPROEX SODIUM 500 MG: 500 TABLET, EXTENDED RELEASE ORAL at 09:06

## 2022-03-29 RX ADMIN — CYANOCOBALAMIN TAB 500 MCG 1000 MCG: 500 TAB at 09:06

## 2022-03-29 RX ADMIN — HEPARIN SODIUM 5000 UNITS: 5000 INJECTION INTRAVENOUS; SUBCUTANEOUS at 05:11

## 2022-03-29 RX ADMIN — POLYETHYLENE GLYCOL 3350 17 G: 17 POWDER, FOR SOLUTION ORAL at 09:16

## 2022-03-29 NOTE — PHYSICAL THERAPY NOTE
Physical Therapy Evaluation   Time in: 1305  Time out: 1316  Total evaluation time: 11 minutes    Patient's Name: Dev Rondon    Admitting Diagnosis  Altered mental status [R41 82]    Problem List  Patient Active Problem List   Diagnosis    Cataract    Cerebral palsy ([facility]    Type 2 diabetes mellitus without complication, without long-term current use of insulin ([facility] )    Generalized convulsive epilepsy ([facility] )    Hyperlipidemia    Essential hypertension    Acquired hypothyroidism    Osteoporosis    Scoliosis    Vitamin B12 deficiency    Vitamin D deficiency    Seborrheic dermatitis of scalp    Nearsightedness    Behavior concern in adult    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[address]    Acute cystitis    Metabolic encephalopathy    Severe sepsis ([address] [facility]    Hyponatremia    Thrombocytopathia ([facility]    Hypomagnesemia    Social problem    Buttock wound, left, subsequent encounter    Moderate protein-calorie malnutrition ([address] [facility]    Pressure injury of left buttock, stage 4 ([facility] )    COVID-19 virus infection    Ambulatory dysfunction       Past Medical History  Past Medical History:   Diagnosis Date    ADRIANA (acute kidney injury) ([facility] ) 9/24/2019    Bacteremia due to Gram-positive bacteria 9/7/2021    CP (cerebral palsy) (HCC)     Depression     Diabetes ([address] [facility]     Disease of thyroid gland     Gait disorder     Gluteal abscess 9/6/2021    Hyperlipidemia     Hypertension     Kidney failure     Kidney stones     Osteoporosis     Psychiatric disorder     Scoliosis     Seizures ([address] [facility]     Sepsis ([facility] ) 9/25/2019    Thyroid disease     UTI (urinary tract infection)        Past Surgical History  Past Surgical History:   Procedure Laterality Date    APPENDECTOMY      IR PICC PLACEMENT SINGLE LUMEN  9/13/2021    IR PICC PLACEMENT SINGLE LUMEN  9/16/2021       PT performed at least 2 patient identifiers during session: Name and wristband         03/29/22 1316   PT Last Visit   PT Visit Date 03/29/22   Note Type   Note type Evaluation   Pain Assessment   Pain Assessment Tool 0-10   Pain Score No Pain   Restrictions/Precautions   Weight Bearing Precautions Per Order No   Other Precautions Airborne/isolation;Contact/isolation; Fall Risk;Cognitive   Home Living   Type of Home Group Home   Home Layout Two level;Performs ADLs on one level; Able to live on main level with bedroom/bathroom; Access  (0 DHARMESH)   Bathroom Equipment Shower chair   [address]   Additional Comments pt is poor historian and unable to provide PLOF/social history  Information obtained from previous admission in chart  Prior Function   Level of [place] Needs assistance with ADLs and functional mobility; Needs assistance with IADLs   Lives With Other (Comment)  (caregiver)   Receives Help From Personal care attendant; Family   ADL Assistance Needs assistance   IADLs Needs assistance   Falls in the last 6 months   (pt has + fall history per chart review)   General   Family/Caregiver Present No   Cognition   Overall Cognitive Status Impaired   Arousal/Participation Alert   Orientation Level Oriented to person;Disoriented to place; Disoriented to time;Disoriented to situation  (pt states birthday)   Memory Decreased recall of biographical information;Decreased recall of recent events;Decreased recall of precautions   Following Commands Follows one step commands inconsistently   Comments pt appears agreeable to attempting OOB mobility   Subjective   Subjective pt requesting TV to be turned on   RLE Assessment   RLE Assessment   (at least 3/5 observed c OOB mobility)   LLE Assessment   LLE Assessment   (at least 3/5 observed c OOB mobility)   Bed Mobility   Supine to Sit 5  Supervision   Additional items Assist x 1;HOB elevated   Sit to Supine 5  Supervision   Additional items Assist x 1;HOB elevated   Transfers   Sit to Stand   (primarily CGA for steadying support)   Additional items Assist x 1   Stand to Sit   (primarily CGA for steadying support)   Additional items Assist x 1   Toilet transfer   (primarily CGA for steadying support)   Additional items Assist x 1;Standard toilet   Ambulation/Elevation   Gait pattern Narrow GORGE; Decreased foot clearance; Short stride   Gait Assistance   (primarily CGA for steadying support)   Additional items Assist x 1   Assistive Device   (HHA)   Distance 12 ft x 2   Stair Management Assistance Not tested   Balance   Static Sitting Good   Dynamic Sitting Fair   Static Standing Fair   Dynamic Standing Fair -   Ambulatory Fair -   Endurance Deficit   Endurance Deficit No   Activity Tolerance   Activity Tolerance Patient tolerated treatment well   Elyce Roles Dr Julissa Lu made aware of outcomes   Nurse Made Aware Yes, RN made aware of outcomes/recs   Assessment   Prognosis Good   Problem List Decreased strength; Impaired balance;Decreased mobility; Decreased cognition; Impaired judgement;Decreased safety awareness   Assessment Pt is 49 y o  male seen for high-complexity PT evaluation on 3/29/2022 s/p admit to [facility]  on 3/28/2022 w/ COVID-19 virus infection, presents to ED with unsteady gait  PT was consulted to assess pt's functional mobility and d/c needs  Order placed for PT eval and tx, w/ up w/ A order  PTA, pt resides c caregiver in [address] [facility] c 1st floor setup and 0 DHARMESH, uses w/c vs no AD at baseline, requires A for ADLs/IADLs at baseline  At time of eval, pt requires CGA for all mobility tasks  Upon evaluation, pt presenting with impaired functional mobility d/t decreased strength, impaired balance, decreased mobility, decreased cognition, impaired judgement and decreased safety awareness  Pertinent PMHx and current co-morbidities affecting pt's physical performance at time of assessment include: DM type 2, CP, HTN, hypothyroidism, generalized convulsive epilepsy   Personal factors affecting pt at time of eval include: decreased cognition, positive fall history and limited insight into impairments  The following objective measures performed on IE also reveal limitations: AM-PAC 6-Clicks: 80/58  Pt's clinical presentation is currently unstable/unpredictable seen in pt's presentation of baseline cognitive deficits  Overall, pt's rehab potential and prognosis to return to PLOF is good as impacted by objective findings, warranting pt to receive further skilled PT interventions to address identified impairments, activity limitation(s), and participation restriction(s)  Pt to benefit from continued PT tx to address deficits as defined above and maximize level of functional independent mobility and consistency in order for pt to improve activity tolerance  From PT/mobility standpoint, recommendation at time of d/c would be no rehabilitation needs pending progress in order to facilitate return to PLOF  Barriers to Discharge None   Goals   Patient Goals none expressed by pt d/t baseline cognitive status   [facility] Expiration Date 04/08/22   Short Term Goal #1 In 7-10 days: Increase bilateral LE strength 1/2 grade to facilitate independent mobility, Perform all bed mobility tasks with SBA of 1 to decrease caregiver burden, Perform all transfers with SBA of 1 to improve independence, Ambulate > 50 ft  with least restrictive assistive device with SBA of 1 w/o LOB and w/ normalized gait pattern 100% of the time, Increase all balance 1/2 grade to decrease risk for falls, Tolerate 4 hr OOB to faciliate upright tolerance and Complete % of the time   PT Treatment Day 0   Plan   Treatment/Interventions Functional transfer training;LE strengthening/ROM; Therapeutic exercise;Patient/family training;Gait training;Spoke to nursing   PT Frequency 2-3x/wk   Recommendation   PT Discharge Recommendation No rehabilitation needs   Equipment Recommended   (none at this time)   Additional Comments Pt's raw score on the [address] inpatient short form is 20, standardized score is 43 99  Patients at this level are likely to benefit from DC to home with no services, however, please refer to therapist recommendation for safe DC planning  AM-PAC Basic Mobility Inpatient   Turning in Bed Without Bedrails 4   Lying on Back to Sitting on Edge of Flat Bed 4   Moving Bed to Chair 3   Standing Up From Chair 3   Walk in Room 3   Climb 3-5 Stairs 3   Basic Mobility Inpatient Raw Score 20   Basic Mobility Standardized Score 43 99   Highest Level Of Mobility   JH-HLM Goal 6: Walk 10 steps or more   JH-HLM Highest Level of Mobility 6: Walk 10 steps or more   JH-HLM Goal Achieved Yes   End of Consult   Patient Position at End of Consult Supine; All needs within reach       Giovanna Lugo, PT, DPT

## 2022-03-29 NOTE — ASSESSMENT & PLAN NOTE
· Denies any shortness of breath and has oxygen saturations of 99% on room air  · COVID positive 3/28/2022  · CT chest abdomen pelvis (3/28): No acute abnormality in chest, abdomen, or pelvis  Focal skin thickening along posterior aspect of left gluteal region, markedly improved since 9/5/2021  0 7 cm nonobstructing renal calculus in left kidney    · Will not initiate steroids or remdesivir as the patient is asymptomatic and not requiring any supplemental oxygen  · I discussed with his caregiver and she is able to take him back home with her once medically cleared

## 2022-03-29 NOTE — PLAN OF CARE
Problem: PHYSICAL THERAPY ADULT  Goal: Performs mobility at highest level of function for planned discharge setting  See evaluation for individualized goals  Description: Treatment/Interventions: Functional transfer training,LE strengthening/ROM,Therapeutic exercise,Patient/family training,Gait training,Spoke to nursing  Equipment Recommended:  (none at this time)       See flowsheet documentation for full assessment, interventions and recommendations  Note: Prognosis: Good  Problem List: Decreased strength,Impaired balance,Decreased mobility,Decreased cognition,Impaired judgement,Decreased safety awareness  Assessment: Pt is 49 y o  male seen for high-complexity PT evaluation on 3/29/2022 s/p admit to [facility] on 3/28/2022 w/ COVID-19 virus infection, presents to ED with unsteady gait  PT was consulted to assess pt's functional mobility and d/c needs  Order placed for PT eval and tx, w/ up w/ A order  PTA, pt resides c caregiver in 2 [facility] c 1st floor setup and 0 [facility], uses w/c vs no AD at baseline, requires A for ADLs/IADLs at baseline  At time of eval, pt requires CGA for all mobility tasks  Upon evaluation, pt presenting with impaired functional mobility d/t decreased strength, impaired balance, decreased mobility, decreased cognition, impaired judgement and decreased safety awareness  Pertinent PMHx and current co-morbidities affecting pt's physical performance at time of assessment include: DM type 2, CP, HTN, hypothyroidism, generalized convulsive epilepsy  Personal factors affecting pt at time of eval include: decreased cognition, positive fall history and limited insight into impairments  The following objective measures performed on IE also reveal limitations: AM-PAC 6-Clicks: 49/15  Pt's clinical presentation is currently unstable/unpredictable seen in pt's presentation of baseline cognitive deficits   Overall, pt's rehab potential and prognosis to return to PLOF is good as impacted by objective findings, warranting pt to receive further skilled PT interventions to address identified impairments, activity limitation(s), and participation restriction(s)  Pt to benefit from continued PT tx to address deficits as defined above and maximize level of functional independent mobility and consistency in order for pt to improve activity tolerance  From PT/mobility standpoint, recommendation at time of d/c would be no rehabilitation needs pending progress in order to facilitate return to PLOF  Barriers to Discharge: None        PT Discharge Recommendation: No rehabilitation needs          See flowsheet documentation for full assessment

## 2022-03-29 NOTE — ASSESSMENT & PLAN NOTE
· Seizure precautions  · Continue home medications including Keppra and Depakote  · May use clonazepam for clusters of myoclonic jerking-disintegrating tablet is non formulary  · Continue outpatient follow-up

## 2022-03-29 NOTE — ASSESSMENT & PLAN NOTE
· Worsening unstable gait noted by caregiver and nursing staff at home yesterday  · No HHC services required per PT

## 2022-03-29 NOTE — ASSESSMENT & PLAN NOTE
Lab Results   Component Value Date    HGBA1C 5 5 03/09/2022       No results for input(s): POCGLU in the last 72 hours      Blood Sugar Average: Last 72 hrs:     · Resume home Metformin at discharge  · Continue home gabapentin 100 mg t i d   · Continue corrective, and hypoglycemic protocol

## 2022-03-29 NOTE — ED NOTES
Pt incontinent of urine  Fresh brief and linens applied  Pt verbalized that he needed to have a bowel movement, pt was ambulated to restroom where he had BM and voided         Favio Serrano RN  03/28/22 2912

## 2022-03-29 NOTE — DISCHARGE SUMMARY
[facility] [address]  Discharge- Jaylan Benton 1965, 49 y o  male MRN: [id number]  Unit/Bed#: ED 01 Encounter: [id number]  Primary Care Provider: HUNTER Medeiros   Date and time admitted to hospital: 3/28/2022  3:02 PM    * COVID-19 virus infection  Assessment & Plan  · Denies any shortness of breath and has oxygen saturations of 99% on room air  · COVID positive 3/28/2022  · CT chest abdomen pelvis (3/28): No acute abnormality in chest, abdomen, or pelvis  Focal skin thickening along posterior aspect of left gluteal region, markedly improved since 9/5/2021  0 7 cm nonobstructing renal calculus in left kidney  · Will not initiate steroids or remdesivir as the patient is asymptomatic and not requiring any supplemental oxygen  · I discussed with his caregiver and she is able to take him back home with her once medically cleared     Ambulatory dysfunction  Assessment & Plan  · Worsening unstable gait noted by caregiver and nursing staff at home yesterday  · No HHC services required per PT    Cerebral palsy ([facility] )  Assessment & Plan  · Mentation appears at his baseline  · He resides in a group home    Type 2 diabetes mellitus without complication, without long-term current use of insulin ([facility] )  Assessment & Plan  Lab Results   Component Value Date    HGBA1C 5 5 03/09/2022       No results for input(s): POCGLU in the last 72 hours  Blood Sugar Average: Last 72 hrs:     · Resume home Metformin at discharge  · Continue home gabapentin 100 mg t i d   · Continue corrective, and hypoglycemic protocol      Generalized convulsive epilepsy ([facility] )  Assessment & Plan  · Continue home Keppra 1500 mg twice daily and Depakote 500 mg q a m  And 1000 mg q h s  Behavior concern in adult  Assessment & Plan  · Continue home Seroquel 200 mg b i d  An additional 400 mg q h s      Essential hypertension  Assessment & Plan  · Continue home lisinopril 10 mg daily    Acquired hypothyroidism  Assessment & Plan  · Continue levothyroxine 125 mcg daily      Medical Problems             Resolved Problems  Date Reviewed: 3/28/2022    None              Discharging Physician / Practitioner: Angie Salmeron DO  PCP: Ferdinand Bello  Admission Date:   Admission Orders (From admission, onward)     Ordered        03/28/22 1731  Inpatient Admission  Once                      Discharge Date: 03/29/22    Consultations During Hospital Stay:  · None    Procedures Performed:   · None    Significant Findings / Test Results:   · COVID-19 + 3/28/2022    Incidental Findings:   · COVID-19 +     Test Results Pending at Discharge (will require follow up): · None     Outpatient Tests Requested:  · To be determined in the outpatient setting upon follow-up with PCP    Complications:  None    Reason for Admission: Ambulatory dysfunction    Hospital Course:   Karen Delcid is a 49 y o  male patient who originally presented to the hospital on 3/28/2022 due to ambulatory dysfunction  Please see H&P as documented by Stefan Wells for complete details regarding presenting illness  In brief, the patient has a PMHx of Cerebral Palsy, DMII, and seizure disorder and resides in a group home  Per his care giver, the patient's gait was slightly more unstable and he appeared slightly more lethargic than normal  Upon arrival,  he was found to be positive for COVID-19 but asymptomatic and not requiring any supplemental oxygen  He was admitted to the medical service for evaluation of his ambulatory dysfunction  The morning after arrival the patient appeared to be back at his baseline mentation and was able to ambulate with physical therapy  Physical therapy did not recommend any short-term rehab or home health care services  The patient was ultimately discharged back to his group home in stable condition  All of his caregivers questions were answered prior to his return and she expressed understanding and agreement with this plan    This is a brief discharge summary please see full medical record for more details  The patient, initially admitted to the hospital as inpatient, was discharged earlier than expected given the following: clinical improvement       Please see above list of diagnoses and related plan for additional information  Condition at Discharge: good    Discharge Day Visit / Exam:   Subjective:  Patient is resting comfortably in bed without any acute complaints  He is pleasant and inquisitive  No acute complaints  No overnight events reported by nursing  Vitals: Blood Pressure: 141/92 (03/29/22 0600)  Pulse: 69 (03/29/22 0600)  Temperature: 98 1 °F (36 7 °C) (03/28/22 1506)  Temp Source: Tympanic (03/28/22 1506)  Respirations: 16 (03/28/22 2330)  Weight - Scale: 68 kg (150 lb) (03/29/22 0801)  SpO2: 95 % (03/29/22 0600)     Exam:   Physical Exam  Vitals and nursing note reviewed  Constitutional:       General: He is not in acute distress  Appearance: Normal appearance  HENT:      Head: Normocephalic and atraumatic  Mouth/Throat:      Mouth: Mucous membranes are moist       Pharynx: Oropharynx is clear  Eyes:      Extraocular Movements: Extraocular movements intact  Conjunctiva/sclera: Conjunctivae normal    Cardiovascular:      Rate and Rhythm: Normal rate and regular rhythm  Pulses: Normal pulses  Heart sounds: Normal heart sounds  Pulmonary:      Effort: Pulmonary effort is normal  No respiratory distress  Breath sounds: Normal breath sounds  No wheezing  Abdominal:      General: Bowel sounds are normal  There is no distension  Palpations: Abdomen is soft  Tenderness: There is no abdominal tenderness  Musculoskeletal:         General: Normal range of motion  Cervical back: Normal range of motion and neck supple  Skin:     General: Skin is warm and dry  Neurological:      General: No focal deficit present  Mental Status: He is alert   Mental status is at baseline  Psychiatric:         Mood and Affect: Mood normal         Discussion with Family: Caregiver- Samy Nation  Discharge instructions/Information to patient and family:   See after visit summary for information provided to patient and family  Provisions for Follow-Up Care:  See after visit summary for information related to follow-up care and any pertinent home health orders  Disposition:   Home    Planned Readmission: None     Discharge Statement:  I spent > 35 minutes discharging the patient  This time was spent on the day of discharge  I had direct contact with the patient on the day of discharge  Greater than 50% of the total time was spent examining patient, answering all patient questions, arranging and discussing plan of care with patient as well as directly providing post-discharge instructions  Additional time then spent on discharge activities  Discharge Medications:  See after visit summary for reconciled discharge medications provided to patient and/or family        **Please Note: This note may have been constructed using a voice recognition system**

## 2022-03-29 NOTE — ASSESSMENT & PLAN NOTE
Lab Results   Component Value Date    HGBA1C 5 5 03/09/2022       No results for input(s): POCGLU in the last 72 hours      Blood Sugar Average: Last 72 hrs:     · Diabetic diet  · Fingerstick blood sugar sliding scale coverage  · Hold home metformin

## 2022-03-30 ENCOUNTER — TRANSITIONAL CARE MANAGEMENT (OUTPATIENT)
Dept: FAMILY MEDICINE CLINIC | Facility: CLINIC | Age: 57
End: 2022-03-30

## 2022-03-30 RX ORDER — CLONAZEPAM 0.25 MG/1
0.25 TABLET, ORALLY DISINTEGRATING ORAL AS NEEDED
Qty: 10 TABLET | Refills: 5 | Status: SHIPPED | OUTPATIENT
Start: 2022-03-30 | End: 2022-05-25 | Stop reason: SDUPTHER

## 2022-03-30 RX ORDER — DIVALPROEX SODIUM 500 MG
TABLET, EXTENDED RELEASE 24 HR ORAL
Qty: 90 TABLET | Refills: 11 | Status: SHIPPED | OUTPATIENT
Start: 2022-03-30 | End: 2022-05-25 | Stop reason: SDUPTHER

## 2022-03-30 RX ORDER — LEVETIRACETAM 750 MG/1
TABLET ORAL
Qty: 112 TABLET | Refills: 11 | Status: SHIPPED | OUTPATIENT
Start: 2022-03-30

## 2022-03-30 RX ORDER — LACOSAMIDE 200 MG/1
200 TABLET ORAL EVERY 12 HOURS SCHEDULED
Qty: 60 TABLET | Refills: 5 | Status: SHIPPED | OUTPATIENT
Start: 2022-03-30

## 2022-03-30 NOTE — DISCHARGE INSTRUCTIONS
RETURN IF WORSE IN ANY WAY:   PAIN,   FEVER OR FLU LIKE SYMPTOMS,   NECK STIFFNESS,  VOMITING,  SEIZURE ACTIVITY OR CONFUSION,  OR NEW AND CONCERNING SYMPTOMS SIGNS OR SYMPTOMS      PLEASE CALL YOUR PRIMARY DOCTOR IN THE MORNING TO SET UP FOLLOW UP IN 1-2 DAYS  PLEASE REVIEW THE WORK UP RESULTS WITH YOUR DOCTOR        PATIENT SURVEY:   Thank you for your visit today  Your satisfaction is very very important to us  Viry Avila for choosing [facility] for your ER care  If you get a survey in the mail please rate your service as VERY GOOD (other ratings lower than this are actually detrimental) - This helps our team to continue providing excellent care - Viry Avila

## 2022-03-30 NOTE — ED PROVIDER NOTES
History  Chief Complaint   Patient presents with    Seizure - Prior Hx Of     Patient rpeorts he had a seizure tonight, reports staff reports it lasted ~10 minutes  This is a 51-year-old with apparent alteration mental status  Patient is unaccompanied and is unable to relate any history  He has MR documented in the chart and apparently lives at a nursing/group home  History provided by:  Nursing home and EMS personnel  Seizure - Prior Hx Of  Seizure activity on arrival: no    Seizure type:  Unable to specify  Initial focality:  Unable to specify  Return to baseline: yes    Severity:  Unable to specify  Timing:  Once  Progression:  Resolved      Prior to Admission Medications   Prescriptions Last Dose Informant Patient Reported? Taking?    Blood Glucose Monitoring Suppl (Keira Salcido) w/Device KIT  Care Giver No No   Sig: by Does not apply route 3 (three) times a day TEST BLOOD SUGARS THREE TIMES   Patient not taking: Reported on 3/9/2022    Depakote  MG 24 hr tablet  Care Giver No No   Sig: TAKE 1 TABLET BY MOUTH IN THE MORNING *BRAND NECESSARY*;TAKE 2 TABLETS BY MOUTH AT BEDTIME   Humidifiers (Cool Mist Humidifier 1 gallon) MISC  Care Giver No No   Sig: Use daily at bedtime   Incontinence Supply Disposable (Briefs Overnight Medium) MISC  Care Giver No No   Sig: Use in the morning   Insulin Pen Needle (PEN NEEDLES) 31G X 5 MM MISC  Care Giver No No   Sig: by Does not apply route daily   Multiple Vitamin (Daily-Reina) TABS   No No   Sig: Take 1 tablet by mouth daily Take at 8 AM   OneTouch Delica Lancets 93I MISC  Care Giver No No   Sig: by Does not apply route daily TEST BLOOD SUGARS THREE TIMES DAILY   QUEtiapine (SEROquel XR) 200 mg 24 hr tablet   No No   Sig: TAKE 1 TABLET BY MOUTH TWICE A DAY (8AM,2PM)   QUEtiapine (SEROquel XR) 400 mg 24 hr tablet   No No   Sig: TAKE 1 TABLET BY MOUTH AT BEDTIME (8PM) (DX: ANTIPSYCHOTIC)   benztropine (COGENTIN) 0 5 mg tablet  Care Giver No No   Sig: TAKE 1 TABLET BY MOUTH TWICE A DAY (8AM,8PM)   calcium carbonate (OYSTER SHELL,OSCAL) 500 mg   No No   Sig: Take 1 tablet by mouth daily   cholecalciferol (VITAMIN D3) 1,000 units tablet   No No   Sig: Take 1 tablet (1,000 Units total) by mouth daily   clonazePAM (KlonoPIN) 0 25 MG disintegrating tablet  Care Giver No No   Sig: Take 1 tablet (0 25 mg total) by mouth as needed for seizures (clusters of myoclonic jerking (more than 2 myoclonic jerks in a day)) for up to 1 dose   docusate sodium (COLACE) 100 mg capsule  Care Giver No No   Sig: TAKE 1 CAPSULE BY MOUTH TWICE A DAY (8AM,8PM) (DX: CONSTIPATION)   gabapentin (NEURONTIN) 100 mg capsule  Care Giver Yes No   Sig: Take 100 mg by mouth 3 (three) times a day    lacosamide (Vimpat) 200 mg tablet  Care Giver No No   Sig: Take 1 tablet (200 mg total) by mouth every 12 (twelve) hours   levETIRAcetam (KEPPRA) 750 mg tablet  Care Giver No No   Sig: TAKE 2 TABLETS (1500MG) BY MOUTH TWICE A DAY   levothyroxine 125 mcg tablet   No No   Sig: Take 1 tablet (125 mcg total) by mouth daily   lisinopril (ZESTRIL) 10 mg tablet   No No   Sig: Take 1 tablet (10 mg total) by mouth daily   metFORMIN (GLUCOPHAGE) 1000 MG tablet   No No   Sig: Take 1 tablet (1,000 mg total) by mouth 2 (two) times a day with meals   polyethylene glycol (MIRALAX) 17 g packet  Care Giver No No   Sig: Take 17 g by mouth daily   simvastatin (ZOCOR) 40 mg tablet   No No   Sig: Take 1 tablet (40 mg total) by mouth daily   sodium chloride (OCEAN) 0 65 % nasal spray   No No   Si spray into each nostril as needed for congestion   temazepam (RESTORIL) 15 mg capsule   No No   Sig: Take 1 capsule (15 mg total) by mouth daily at bedtime as needed for sleep   vitamin B-12 (VITAMIN B-12) 1,000 mcg tablet   No No   Sig: Take 1 tablet (1,000 mcg total) by mouth daily      Facility-Administered Medications: None       Past Medical History:   Diagnosis Date    ADRIANA [facility]) ([facility] ) 2019    Bacteremia due to Gram-positive bacteria 9/7/2021    [facility]     Depression     Diabetes (HCC)     Disease of thyroid gland     Gait disorder     Gluteal abscess 9/6/2021    Hyperlipidemia     Hypertension     Kidney failure     Kidney stones     Osteoporosis     Psychiatric disorder     Scoliosis     Seizures ([facility]     Sepsis ([facility] ) 9/25/2019    Thyroid disease     UTI (urinary tract infection)        Past Surgical History:   Procedure Laterality Date    APPENDECTOMY      IR PICC PLACEMENT SINGLE LUMEN  9/13/2021    IR PICC PLACEMENT SINGLE LUMEN  9/16/2021       History reviewed  No pertinent family history  I have reviewed and agree with the history as documented  E-Cigarette/Vaping    E-Cigarette Use Never User      E-Cigarette/Vaping Substances    Nicotine No     THC No     CBD No     Flavoring No     Other No     Unknown No      Social History     Tobacco Use    Smoking status: Never Smoker    Smokeless tobacco: Never Used   Vaping Use    Vaping Use: Never used   Substance Use Topics    Alcohol use: Never    Drug use: No       Review of Systems   Unable to perform ROS: Other (MR)       Physical Exam  Physical Exam  Constitutional:       General: He is not in acute distress  Appearance: He is well-developed  He is not ill-appearing, toxic-appearing or diaphoretic  HENT:      Head: Normocephalic and atraumatic  Right Ear: External ear normal       Left Ear: External ear normal       Nose: Nose normal       Mouth/Throat:      Pharynx: No oropharyngeal exudate  Eyes:      General: No scleral icterus  Right eye: No discharge  Left eye: No discharge  Extraocular Movements: Extraocular movements intact  Conjunctiva/sclera: Conjunctivae normal       Pupils: Pupils are equal, round, and reactive to light  Neck:      Vascular: No JVD  Trachea: No tracheal deviation  Cardiovascular:      Rate and Rhythm: Normal rate and regular rhythm  Pulses: Normal pulses  Heart sounds: Normal heart sounds  No murmur heard  No friction rub  No gallop  Pulmonary:      Effort: Pulmonary effort is normal  No respiratory distress  Breath sounds: Normal breath sounds  No stridor  No wheezing, rhonchi or rales  Chest:      Chest wall: No tenderness  Abdominal:      General: Bowel sounds are normal  There is no distension  Palpations: Abdomen is soft  There is no mass  Tenderness: There is no abdominal tenderness  There is no right CVA tenderness, left CVA tenderness, guarding or rebound  Hernia: No hernia is present  Musculoskeletal:         General: No swelling, tenderness, deformity or signs of injury  Normal range of motion  Cervical back: Normal range of motion and neck supple  No rigidity or tenderness  Right lower leg: No edema  Left lower leg: No edema  Lymphadenopathy:      Cervical: No cervical adenopathy  Skin:     General: Skin is warm  Capillary Refill: Capillary refill takes less than 2 seconds  Coloration: Skin is not jaundiced or pale  Findings: No bruising, erythema, lesion or rash  Neurological:      Mental Status: He is alert and oriented to person, place, and time  Cranial Nerves: No cranial nerve deficit  Sensory: No sensory deficit  Motor: No abnormal muscle tone  Coordination: Coordination normal    Psychiatric:         Mood and Affect: Mood normal          Behavior: Behavior normal          Thought Content:  Thought content normal          Judgment: Judgment normal          Vital Signs  ED Triage Vitals   Temperature Pulse Respirations Blood Pressure SpO2   03/29/22 2112 03/29/22 2114 03/29/22 2112 03/29/22 2114 03/29/22 2114   98 1 °F (36 7 °C) 89 18 127/59 95 %      Temp Source Heart Rate Source Patient Position - Orthostatic VS BP Location FiO2 (%)   03/29/22 2112 -- 03/29/22 2114 03/29/22 2114 --   Oral  Lying Left arm       Pain Score       -- Vitals:    03/29/22 2114   BP: 127/59   Pulse: 89   Patient Position - Orthostatic VS: Lying         Visual Acuity      ED Medications  Medications - No data to display    Diagnostic Studies  Results Reviewed     None                 No orders to display              Procedures  Procedures         ED Course  ED Course as of 03/30/22 2106   Tue Mar 29, 2022   2253 Comprehensive metabolic panel(!)   3018 PTT: 32   2253 PROTIME: 12 4   2253 POCT INR: 0 93   2253 CBC and differential(!)   2253 Labs at baseline     2253 Pt stable  He has had a completely stable exam in the ED  Will dc back to NH                                             MDM    Disposition  Final diagnoses:   None     ED Disposition     None      Follow-up Information    None         Patient's Medications   Discharge Prescriptions    No medications on file       No discharge procedures on file      PDMP Review       Value Time User    PDMP Reviewed  Yes 2/10/2022  8:30 AM [address] [place] [address],[address] [place] [address]          ED Provider  Electronically Signed by           Anisha Bar MD  03/30/22 2107

## 2022-04-05 ENCOUNTER — OFFICE VISIT (OUTPATIENT)
Dept: FAMILY MEDICINE CLINIC | Facility: CLINIC | Age: 57
End: 2022-04-05
Payer: MEDICARE

## 2022-04-05 ENCOUNTER — TELEPHONE (OUTPATIENT)
Dept: FAMILY MEDICINE CLINIC | Facility: CLINIC | Age: 57
End: 2022-04-05

## 2022-04-05 VITALS
BODY MASS INDEX: 21.21 KG/M2 | HEART RATE: 88 BPM | SYSTOLIC BLOOD PRESSURE: 118 MMHG | WEIGHT: 156.6 LBS | DIASTOLIC BLOOD PRESSURE: 78 MMHG | OXYGEN SATURATION: 97 % | HEIGHT: 72 IN | TEMPERATURE: 98.7 F

## 2022-04-05 DIAGNOSIS — G40.309 GENERALIZED CONVULSIVE EPILEPSY (HCC): Chronic | ICD-10-CM

## 2022-04-05 DIAGNOSIS — Z76.89 ENCOUNTER FOR SUPPORT AND COORDINATION OF TRANSITION OF CARE: Primary | ICD-10-CM

## 2022-04-05 DIAGNOSIS — G80.9 CEREBRAL PALSY, UNSPECIFIED TYPE (HCC): ICD-10-CM

## 2022-04-05 DIAGNOSIS — U07.1 COVID-19 VIRUS INFECTION: ICD-10-CM

## 2022-04-05 PROCEDURE — 99495 TRANSJ CARE MGMT MOD F2F 14D: CPT | Performed by: NURSE PRACTITIONER

## 2022-04-05 RX ORDER — DIVALPROEX SODIUM 500 MG/1
TABLET, DELAYED RELEASE ORAL
COMMUNITY
Start: 2022-03-31 | End: 2022-05-25 | Stop reason: SDUPTHER

## 2022-04-05 RX ORDER — GABAPENTIN 300 MG/1
CAPSULE ORAL
COMMUNITY
Start: 2022-03-30

## 2022-04-05 NOTE — PROGRESS NOTES
Transition of Care  Follow-up After Hospitalization    Negar Oconnell 49 y o  male   Date:  4/5/2022    TCM Call (since 3/5/2022)     Date and time call was made  3/30/2022  8:43 AM    Hospital care reviewed  Records reviewed        Patient was hospitialized at  [facility]        Date of Admission  03/28/22    Date of discharge  03/29/22    Diagnosis  COVID-19    Disposition  Home    Were the patients medications reviewed and updated  Yes      TCM Call (since 3/5/2022)     Scheduled for follow up? Yes    Did you obtain your prescribed medications  Yes    Do you need help managing your prescriptions or medications  Yes    Is transportation to your appointment needed  Yes    I have advised the patient to call PCP with any new or worsening symptoms  Rhina Splinter Andrukaitis MA    Are you recieving any outpatient services  No    Are you recieving home care services  No    Are you using any community resources  Yes    Which resources are you recieving  WAIVER    Current waiver services  Yes    What services are you recieving  CAREGIVER    Have you fallen in the last 12 months  No    Interperter language line needed  No    Counseling  6 Issa Gonzalez records were reviewed  Medications upon discharge reviewed/updated  Medication Changes: none  Imaging: CT ABD/pelvise, CTH, CXR  Consults: none  Follow up visits with other specialists: none      Assessment and Plan:    John Gallego was seen today for transition of care management  Diagnoses and all orders for this visit:    Encounter for support and coordination of transition of care    COVID-19 virus infection    Generalized convulsive epilepsy ([facility] )    Cerebral palsy, unspecified type ([facility] )            HPI:  Mahamed Mcgarry present for TCM visit s/p hospitalization for COVID  Pt was monitored in Observation  The following day Pt was back in ED for seizure activity  He was release back to caregiver after 23 hours     Caregiver reports that Pt was vaccinated and boosted for COVID  Have record of [address] vaccin 8/2021  Caregiver will get COVID records  Pt is able to get 4th vaccine administration if caregiver elects to do so  ROS: Review of Systems   Unable to perform ROS: Other       Past Medical History:   Diagnosis Date    [facility] 9/24/2019    Bacteremia due to Gram-positive bacteria 9/7/2021    COVID-19     [facility]     Depression     Diabetes ([place] [address] )     Disease of thyroid gland     Gait disorder     Gluteal abscess 9/6/2021    Hyperlipidemia     Hypertension     Kidney failure     Kidney stones     Osteoporosis     Psychiatric disorder     Scoliosis     Seizures ([facility] )     Sepsis ([facility] ) 9/25/2019    Thyroid disease     UTI (urinary tract infection)        Past Surgical History:   Procedure Laterality Date    APPENDECTOMY      IR PICC PLACEMENT SINGLE LUMEN  9/13/2021    IR PICC PLACEMENT SINGLE LUMEN  9/16/2021       Social History     Socioeconomic History    Marital status: Single     Spouse name: None    Number of children: None    Years of education: None    Highest education level: None   Occupational History    None   Tobacco Use    Smoking status: Never Smoker    Smokeless tobacco: Never Used   Vaping Use    Vaping Use: Never used   Substance and Sexual Activity    Alcohol use: Never    Drug use: No    Sexual activity: Not Currently   Other Topics Concern    None   Social History Narrative    None     Social Determinants of Health     Financial Resource Strain: Not on file   Food Insecurity: No Food Insecurity    Worried About Running Out of Food in the Last Year: Never true    Melinda of Food in the Last Year: Never true   Transportation Needs: No Transportation Needs    Lack of Transportation (Medical): No    Lack of Transportation (Non-Medical):  No   Physical Activity: Not on file   Stress: Not on file   Social Connections: Not on file   Intimate Partner Violence: Not on file Housing Stability: Low Risk     Unable to Pay for Housing in the Last Year: No    Number of Places Lived in the Last Year: 1    Unstable Housing in the Last Year: No       No family history on file      Allergies   Allergen Reactions    Erythromycin Other (See Comments)     Unknown per pt    Penicillins Other (See Comments)     Unknown per pt         Current Outpatient Medications:     benztropine (COGENTIN) 0 5 mg tablet, TAKE 1 TABLET BY MOUTH TWICE A DAY (8AM,8PM), Disp: 56 tablet, Rfl: 4    Blood Glucose Monitoring Suppl (Edwige Alexander) w/Device KIT, by Does not apply route 3 (three) times a day TEST BLOOD SUGARS THREE TIMES (Patient not taking: Reported on 3/9/2022 ), Disp: 1 kit, Rfl: 0    calcium carbonate (OYSTER SHELL,OSCAL) 500 mg, Take 1 tablet by mouth daily, Disp: 90 tablet, Rfl: 1    cholecalciferol (VITAMIN D3) 1,000 units tablet, Take 1 tablet (1,000 Units total) by mouth daily, Disp: 90 tablet, Rfl: 1    clonazePAM (KlonoPIN) 0 25 MG disintegrating tablet, Take 1 tablet (0 25 mg total) by mouth as needed for seizures (clusters of myoclonic jerking (more than 2 myoclonic jerks in a day)) for up to 1 dose, Disp: 10 tablet, Rfl: 5    Depakote  MG 24 hr tablet, TAKE 1 TABLET BY MOUTH IN THE MORNING *BRAND NECESSARY*;TAKE 2 TABLETS BY MOUTH AT BEDTIME, Disp: 90 tablet, Rfl: 11    divalproex sodium (DEPAKOTE) 500 mg EC tablet, , Disp: , Rfl:     docusate sodium (COLACE) 100 mg capsule, TAKE 1 CAPSULE BY MOUTH TWICE A DAY (8AM,8PM) (DX: CONSTIPATION), Disp: 56 capsule, Rfl: 4    gabapentin (NEURONTIN) 100 mg capsule, Take 100 mg by mouth 3 (three) times a day , Disp: , Rfl:     gabapentin (NEURONTIN) 300 mg capsule, , Disp: , Rfl:     Humidifiers (Cool Mist Humidifier 1 gallon) MISC, Use daily at bedtime, Disp: 1 each, Rfl: 0    Incontinence Supply Disposable (Briefs Overnight Medium) MISC, Use in the morning, Disp: 20 each, Rfl: 11    Insulin Pen Needle (PEN NEEDLES) 31G X 5 MM MISC, by Does not apply route daily, Disp: 100 each, Rfl: 5    lacosamide (Vimpat) 200 mg tablet, Take 1 tablet (200 mg total) by mouth every 12 (twelve) hours, Disp: 60 tablet, Rfl: 5    levETIRAcetam (KEPPRA) 750 mg tablet, Take 2 tablets (1500mg) by mouth twice per day , Disp: 112 tablet, Rfl: 11    levothyroxine 125 mcg tablet, Take 1 tablet (125 mcg total) by mouth daily, Disp: 90 tablet, Rfl: 1    lisinopril (ZESTRIL) 10 mg tablet, Take 1 tablet (10 mg total) by mouth daily, Disp: 90 tablet, Rfl: 1    metFORMIN (GLUCOPHAGE) 1000 MG tablet, Take 1 tablet (1,000 mg total) by mouth 2 (two) times a day with meals, Disp: 240 tablet, Rfl: 0    Multiple Vitamin (Daily-Reina) TABS, Take 1 tablet by mouth daily Take at 8 AM, Disp: 90 tablet, Rfl: 1    OneTouch Delica Lancets 34L MISC, by Does not apply route daily TEST BLOOD SUGARS THREE TIMES DAILY, Disp: 100 each, Rfl: 2    polyethylene glycol (MIRALAX) 17 g packet, Take 17 g by mouth daily, Disp: 14 each, Rfl: 8    QUEtiapine (SEROquel XR) 200 mg 24 hr tablet, TAKE 1 TABLET BY MOUTH TWICE A DAY (8AM,2PM), Disp: 16 tablet, Rfl: 10    QUEtiapine (SEROquel XR) 400 mg 24 hr tablet, TAKE 1 TABLET BY MOUTH AT BEDTIME (8PM) (DX: ANTIPSYCHOTIC), Disp: 90 tablet, Rfl: 1    simvastatin (ZOCOR) 40 mg tablet, Take 1 tablet (40 mg total) by mouth daily, Disp: 90 tablet, Rfl: 1    sodium chloride (OCEAN) 0 65 % nasal spray, 1 spray into each nostril as needed for congestion, Disp: 15 mL, Rfl: 1    temazepam (RESTORIL) 15 mg capsule, Take 1 capsule (15 mg total) by mouth daily at bedtime as needed for sleep, Disp: 30 capsule, Rfl: 1    vitamin B-12 (VITAMIN B-12) 1,000 mcg tablet, Take 1 tablet (1,000 mcg total) by mouth daily, Disp: 90 tablet, Rfl: 1      Physical Exam:  /78 (BP Location: Left arm, Patient Position: Sitting, Cuff Size: Standard)   Pulse 88   Temp 98 7 °F (37 1 °C) (Tympanic)   Ht 6' (1 829 m)   Wt 71 kg (156 lb 9 6 oz)   SpO2 97%   BMI 21 24 kg/m²     Physical Exam  Vitals and nursing note reviewed  Constitutional:       Appearance: Normal appearance  He is well-developed  Interventions: Face mask in place  HENT:      Head: Normocephalic and atraumatic  Right Ear: Tympanic membrane, ear canal and external ear normal       Left Ear: Tympanic membrane, ear canal and external ear normal       Nose: Nose normal       Mouth/Throat:      Mouth: Mucous membranes are moist       Pharynx: Uvula midline  Eyes:      General: Lids are normal       Conjunctiva/sclera: Conjunctivae normal       Pupils: Pupils are equal, round, and reactive to light  Neck:      Thyroid: No thyroid mass  Vascular: No JVD  Trachea: Trachea and phonation normal    Cardiovascular:      Rate and Rhythm: Normal rate and regular rhythm  Pulses: Normal pulses  Heart sounds: Normal heart sounds, S1 normal and S2 normal  No murmur heard  No friction rub  No gallop  Pulmonary:      Effort: Pulmonary effort is normal       Breath sounds: Normal breath sounds  Abdominal:      General: Bowel sounds are normal       Palpations: Abdomen is soft  Tenderness: There is no abdominal tenderness  Genitourinary:     Comments: Deferred  Musculoskeletal:         General: Normal range of motion  Cervical back: Full passive range of motion without pain, normal range of motion and neck supple  Right lower leg: No edema  Left lower leg: No edema  Lymphadenopathy:      Head:      Right side of head: No submental, submandibular, tonsillar, preauricular, posterior auricular or occipital adenopathy  Left side of head: No submental, submandibular, tonsillar, preauricular, posterior auricular or occipital adenopathy  Cervical: No cervical adenopathy  Skin:     General: Skin is warm and dry  Capillary Refill: Capillary refill takes less than 2 seconds  Neurological:      General: No focal deficit present        Mental Status: He is alert and oriented to person, place, and time  Cranial Nerves: Cranial nerves are intact  Sensory: Sensation is intact  Motor: Motor function is intact  Coordination: Coordination is intact  Gait: Gait is intact  Psychiatric:         Attention and Perception: Attention and perception normal          Mood and Affect: Mood and affect normal          Speech: Speech normal          Behavior: Behavior normal  Behavior is cooperative  Thought Content:  Thought content normal          Cognition and Memory: Cognition normal          Judgment: Judgment normal              Labs:  Lab Results   Component Value Date    WBC 10 81 (H) 03/29/2022    HGB 11 9 (L) 03/29/2022    HCT 35 0 (L) 03/29/2022    MCV 93 03/29/2022     03/29/2022     Lab Results   Component Value Date    K 3 9 03/29/2022    CL 97 03/29/2022    CO2 31 03/29/2022    BUN 13 03/29/2022    CREATININE 0 58 (L) 03/29/2022    GLUF 92 02/21/2022    CALCIUM 9 7 03/29/2022    CORRECTEDCA 9 3 12/21/2021    AST 13 03/29/2022    ALT 5 (L) 03/29/2022    ALKPHOS 43 03/29/2022    EGFR 115 03/29/2022

## 2022-04-05 NOTE — TELEPHONE ENCOUNTER
Mimi form [facility] called and stated that she was unable to make a visit today as she could not find him  I did let her know he did have an apt with us this morning

## 2022-04-12 ENCOUNTER — TELEPHONE (OUTPATIENT)
Dept: SURGERY | Facility: CLINIC | Age: 57
End: 2022-04-12

## 2022-04-14 NOTE — PHYSICIAN ADVISOR
Current patient class: Inpatient  The patient is currently on Hospital Day: 2 at [address] [place] [address]      The patient was admitted to the hospital  on 3/28/22 at  5:31 PM for the following diagnosis:  Altered mental status [R41 82]     After review of the relevant documentation, labs, vital signs and test results, this is a PROVIDER LIABLE case  In this particular case the patient was admitted to the hospital as an inpatient  The patient however failed to satisfy the 2 midnight benchmark and closer scrutiny of the case was warranted  After review of the patient presentation and relevant labs the patient was most appropriate for observation or outpatient class at the time of admission  Given that this patient has already been discharged prior to this review they become a provider liable case  Rationale is as follows: The patient is a 49 yrs   Male who presented to the ED at 3/28/2022  3:02 PM with a chief complaint of Altered Mental Status (Patient has history of mh/mr  Caregiver picked patient last night around 6 pm and had an unsteady gait  Patient caregiver also had confusion but baseline is confused  )  The patient was noted to be COVID positive however the patient had an oxygen saturation of 99% on room air  Patient was pretty much at baseline  Patient does have a history of epilepsy and cerebral palsy  Patient was then discharged the following day  Given the above the patient was most appropriate for observation status on admission  This case is provider liable      The patients vitals on arrival were   ED Triage Vitals [03/28/22 1506]   Temperature Pulse Respirations Blood Pressure SpO2   98 1 °F (36 7 °C) 84 16 135/81 99 %      Temp Source Heart Rate Source Patient Position - Orthostatic VS BP Location FiO2 (%)   Tympanic Monitor Lying Right arm --      Pain Score       No Pain           Past Medical History:   Diagnosis Date    ADRIANA [facility]) ([facility] ) 9/24/2019  Bacteremia due to Gram-positive bacteria 9/7/2021    COVID-19     [facility]     Depression     Diabetes (HCC)     Disease of thyroid gland     Gait disorder     Gluteal abscess 9/6/2021    Hyperlipidemia     Hypertension     Kidney failure     Kidney stones     Osteoporosis     Psychiatric disorder     Scoliosis     Seizures ([facility]     Sepsis ([facility] ) 9/25/2019    Thyroid disease     UTI (urinary tract infection)      Past Surgical History:   Procedure Laterality Date    APPENDECTOMY      IR PICC PLACEMENT SINGLE LUMEN  9/13/2021    IR PICC PLACEMENT SINGLE LUMEN  9/16/2021           Consults have been placed to:   None    Vitals:    03/29/22 0600 03/29/22 0801 03/29/22 1300 03/29/22 1504   BP: 141/92  143/72 138/68   Pulse: 69  78 72   Resp:   16 20   Temp:    98 2 °F (36 8 °C)   TempSrc:       SpO2: 95%  100%    Weight:  68 kg (150 lb)         Most recent labs:    No results for input(s): WBC, HGB, HCT, PLT, K, NA, CALCIUM, BUN, CREATININE, LIPASE, AMYLASE, INR, TROPONINI, CKTOTAL, AST, ALT, ALKPHOS, BILITOT in the last 72 hours  Scheduled Meds:  Continuous Infusions:No current facility-administered medications for this encounter  PRN Meds:      Surgical procedures (if appropriate):

## 2022-04-14 NOTE — PHYSICIAN ADVISOR
Current patient class: Inpatient  The patient is currently on Hospital Day: 2 at [address] [place] [address]      The patient was admitted to the hospital  on 3/28/22 at  5:31 PM for the following diagnosis:  Altered mental status [R41 82]     After review of the relevant documentation, labs, vital signs and test results, this is a PROVIDER LIABLE case  In this particular case the patient was admitted to the hospital as an inpatient  The patient however failed to satisfy the 2 midnight benchmark and closer scrutiny of the case was warranted  After review of the patient presentation and relevant labs the patient was most appropriate for observation or outpatient class at the time of admission  Given that this patient has already been discharged prior to this review they become a provider liable case  Rationale is as follows: The patient is a 49 yrs   Male who presented to the ED at 3/28/2022  3:02 PM with a chief complaint of Altered Mental Status (Patient has history of mh/mr  Caregiver picked patient last night around 6 pm and had an unsteady gait  Patient caregiver also had confusion but baseline is confused  )  Patient has a PMHx of Cerebral Palsy, DMII, and seizure disorder and resides in a group home  Per his care giver, the patient's gait was slightly more unstable and he appeared slightly more lethargic than normal  Upon arrival,  he was found to be positive for COVID-19 but asymptomatic and not requiring any supplemental oxygen  He was admitted to the medical service for evaluation of his ambulatory dysfunction  The morning after arrival the patient appeared to be back at his baseline mentation and was able to ambulate with physical therapy  Physical therapy did not recommend any short-term rehab or home health care services             The patients vitals on arrival were   ED Triage Vitals [03/28/22 1506]   Temperature Pulse Respirations Blood Pressure SpO2   98 1 °F (36 7 °C) 84 16 135/81 99 %      Temp Source Heart Rate Source Patient Position - Orthostatic VS BP Location FiO2 (%)   Tympanic Monitor Lying Right arm --      Pain Score       No Pain           Past Medical History:   Diagnosis Date    AD[place] (acute kidney injury) ([place] [address] ) 9/24/2019    Bacteremia due to Gram-positive bacteria 9/7/2021    COVID-19     [facility]     Depression     Diabetes ([place] [address] )     Disease of thyroid gland     Gait disorder     Gluteal abscess 9/6/2021    Hyperlipidemia     Hypertension     Kidney failure     Kidney stones     Osteoporosis     Psychiatric disorder     Scoliosis     Seizures ([place] [address] )     Sepsis ([place] [address] ) 9/25/2019    Thyroid disease     UTI (urinary tract infection)      Past Surgical History:   Procedure Laterality Date    APPENDECTOMY      IR PICC PLACEMENT SINGLE LUMEN  9/13/2021    IR PICC PLACEMENT SINGLE LUMEN  9/16/2021           Consults have been placed to:   None    Vitals:    03/29/22 0600 03/29/22 0801 03/29/22 1300 03/29/22 1504   BP: 141/92  143/72 138/68   Pulse: 69  78 72   Resp:   16 20   Temp:    98 2 °F (36 8 °C)   TempSrc:       SpO2: 95%  100%    Weight:  68 kg (150 lb)         Most recent labs:    No results for input(s): WBC, HGB, HCT, PLT, K, NA, CALCIUM, BUN, CREATININE, LIPASE, AMYLASE, INR, TROPONINI, CKTOTAL, AST, ALT, ALKPHOS, BILITOT in the last 72 hours  Scheduled Meds:  Continuous Infusions:No current facility-administered medications for this encounter  PRN Meds:      Surgical procedures (if appropriate):

## 2022-04-18 ENCOUNTER — OFFICE VISIT (OUTPATIENT)
Dept: SURGERY | Facility: CLINIC | Age: 57
End: 2022-04-18
Payer: MEDICARE

## 2022-04-18 VITALS
OXYGEN SATURATION: 97 % | RESPIRATION RATE: 16 BRPM | HEART RATE: 81 BPM | DIASTOLIC BLOOD PRESSURE: 80 MMHG | WEIGHT: 154.2 LBS | TEMPERATURE: 97.6 F | SYSTOLIC BLOOD PRESSURE: 138 MMHG | BODY MASS INDEX: 20.88 KG/M2 | HEIGHT: 72 IN

## 2022-04-18 DIAGNOSIS — Z51.89 ENCOUNTER FOR WOUND CARE: Primary | ICD-10-CM

## 2022-04-18 DIAGNOSIS — S31.829D BUTTOCK WOUND, LEFT, SUBSEQUENT ENCOUNTER: ICD-10-CM

## 2022-04-18 PROCEDURE — 99212 OFFICE O/P EST SF 10 MIN: CPT | Performed by: SURGERY

## 2022-04-23 NOTE — PROGRESS NOTES
Progress Note - General Surgery   Jaylan Benton 49 y o  male MRN: [id number]  Encounter: [id number]    Assessment/Plan     56M with persistent left buttock wound after prior complex admission in 9/2021 for sepsis secondary to a left buttock abscess and pressure necrosis with tissue loss  Required incision and drainage, serial debridements, VAC therapy, prolonged antibiotics  Has small persistent residual wound with pink healthy granulation tissue  Have been doing silver dressings with good effect although the surface area of the wound has now increased in size in comparison to last visit  No signs of infection or hypergranulation  Treated with silver nitrate, calcium alginate dressing replaced  Will see the patient again in 2 months  Advised improved hygiene, more frequent dressing changes  Will discuss case with my wound care colleagues and the patient's VNA as well to see if other options are available to help this wound heal to completion  Subjective       Chief Complaint   Patient presents with    Wound Check      Hypergranulation tissue now resolved after silver nitrate and calcium alginate dressings although now the flat pink healthy wound with granulation tissue at the surface of the left buttock has increased in size significantly since last visit but appears clean and noninfected  Caregiver mentions that the patient picks at the wound at times and that overall they have not been cleaning the site, just sponge bathing the rest of his body  I advised removing all of the dressings and allowing him to shower daily to get the wound site clean before redressing it and to do dressing changes daily, not every other day or three days a week  Review of Systems   Constitutional: Negative for activity change, appetite change, chills, fatigue, fever and unexpected weight change  Skin: Positive for wound  All other systems reviewed and are negative        The following portions of the patient's history were reviewed and updated as appropriate: allergies, current medications, past family history, past medical history, past social history, past surgical history and problem list     Objective      Blood pressure 138/80, pulse 81, temperature 97 6 °F (36 4 °C), temperature source Tympanic, resp  rate 16, height 6' (1 829 m), weight 69 9 kg (154 lb 3 2 oz), SpO2 97 %  Physical Exam  Vitals reviewed  Constitutional:       Appearance: He is not ill-appearing  HENT:      Nose: Nose normal       Mouth/Throat:      Mouth: Mucous membranes are moist    Eyes:      Extraocular Movements: Extraocular movements intact  Pupils: Pupils are equal, round, and reactive to light  Cardiovascular:      Rate and Rhythm: Normal rate  Pulmonary:      Effort: Pulmonary effort is normal  No respiratory distress  Musculoskeletal:      Cervical back: Normal range of motion  Skin:     General: Skin is warm and dry  Comments: Left buttock wound approximately 4 x4cm in size, at the surface, pink and clean and granulating, no cellulitis, no fibrinous exudate, no heaped up granulation tissue, size of the wound is nearly twice that seen at the prior visit and the surface area has increased extending outwards from the edges   Neurological:      Mental Status: He is alert  Mental status is at baseline  Psychiatric:         Mood and Affect: Mood normal          Behavior: Behavior normal          Signature:   Neema Olmedo MD  Date: 4/22/2022 Time: 9:16 PM

## 2022-04-27 ENCOUNTER — HOME HEALTH ADMISSION (OUTPATIENT)
Dept: HOME HEALTH SERVICES | Facility: HOME HEALTHCARE | Age: 57
End: 2022-04-27
Payer: MEDICARE

## 2022-05-18 ENCOUNTER — APPOINTMENT (OUTPATIENT)
Dept: RADIOLOGY | Facility: CLINIC | Age: 57
End: 2022-05-18
Payer: MEDICARE

## 2022-05-18 ENCOUNTER — OFFICE VISIT (OUTPATIENT)
Dept: OBGYN CLINIC | Facility: CLINIC | Age: 57
End: 2022-05-18
Payer: MEDICARE

## 2022-05-18 ENCOUNTER — HOME CARE VISIT (OUTPATIENT)
Dept: HOME HEALTH SERVICES | Facility: HOME HEALTHCARE | Age: 57
End: 2022-05-18
Payer: MEDICARE

## 2022-05-18 VITALS
HEIGHT: 72 IN | DIASTOLIC BLOOD PRESSURE: 82 MMHG | SYSTOLIC BLOOD PRESSURE: 152 MMHG | WEIGHT: 154 LBS | HEART RATE: 84 BPM | BODY MASS INDEX: 20.86 KG/M2

## 2022-05-18 VITALS
SYSTOLIC BLOOD PRESSURE: 132 MMHG | OXYGEN SATURATION: 96 % | HEART RATE: 84 BPM | TEMPERATURE: 96.8 F | RESPIRATION RATE: 20 BRPM | DIASTOLIC BLOOD PRESSURE: 80 MMHG

## 2022-05-18 DIAGNOSIS — S52.515D CLOSED NONDISPLACED FRACTURE OF STYLOID PROCESS OF LEFT RADIUS WITH ROUTINE HEALING, SUBSEQUENT ENCOUNTER: Primary | ICD-10-CM

## 2022-05-18 DIAGNOSIS — S52.515D CLOSED NONDISPLACED FRACTURE OF STYLOID PROCESS OF LEFT RADIUS WITH ROUTINE HEALING, SUBSEQUENT ENCOUNTER: ICD-10-CM

## 2022-05-18 PROCEDURE — 99213 OFFICE O/P EST LOW 20 MIN: CPT | Performed by: ORTHOPAEDIC SURGERY

## 2022-05-18 PROCEDURE — 73110 X-RAY EXAM OF WRIST: CPT

## 2022-05-18 NOTE — CASE COMMUNICATION
60 day summary of care for DOS  5/22/22 - 7/20/22    Patient has been seen in the past 60 days for wound care management and instruction  Vital sign ranges have been from 120/80 to 130s/80s  Changes in patient condition in the last 60 days (i e  labs, med changes, falls, assessment findings) include wound issues  Additional disciplines include none   Changes to wound care orders in the past 60 days  The current wound care order is c leanse wound with NSS and pat dry with gauze  Apply hydrofera blue to wound bed  Skin prep to surrounding skin  Cover with allevyn  Wound measurements have gone from 4cm x 5cm x 0 1cm to the current measurements of 2 5cm X 4 1cm X 0 1cm  Current description of wound and surrounding tissue wound bed fully granulating and surrounding tissue clean  The patient remains homebound due to weakness and unsteady gait needs assistance x1 with am bulation  The availability of a willing and able caregiver yes caregiver performs wound care weekly  Patient is unable to perform wound care due to cognitive deficit  MD  appointment 6/20/22    Patient will require continued care for wound care  All home health orders including treatments and visit order frequencies were reviewed and deemed appropriate at the time of this summary

## 2022-05-18 NOTE — PROGRESS NOTES
Chief Complaint  Left wrist pain    History Of Presenting Illness  Perla Leroy 1965 presents with Left wrist pain due to fracture left radial styloid tip 11/23/21  Patient is asymptomatic now      Current Medications  Current Outpatient Medications   Medication Sig Dispense Refill    benztropine (COGENTIN) 0 5 mg tablet TAKE 1 TABLET BY MOUTH TWICE A DAY (8AM,8PM) 56 tablet 4    calcium carbonate (OYSTER SHELL,OSCAL) 500 mg Take 1 tablet by mouth daily 90 tablet 1    cholecalciferol (VITAMIN D3) 1,000 units tablet Take 1 tablet (1,000 Units total) by mouth daily 90 tablet 1    clonazePAM (KlonoPIN) 0 25 MG disintegrating tablet Take 1 tablet (0 25 mg total) by mouth as needed for seizures (clusters of myoclonic jerking (more than 2 myoclonic jerks in a day)) for up to 1 dose 10 tablet 5    Depakote  MG 24 hr tablet TAKE 1 TABLET BY MOUTH IN THE MORNING *BRAND NECESSARY*;TAKE 2 TABLETS BY MOUTH AT BEDTIME 90 tablet 11    divalproex sodium (DEPAKOTE) 500 mg EC tablet       docusate sodium (COLACE) 100 mg capsule TAKE 1 CAPSULE BY MOUTH TWICE A DAY (8AM,8PM) (DX: CONSTIPATION) 56 capsule 4    gabapentin (NEURONTIN) 100 mg capsule Take 100 mg by mouth 3 (three) times a day       gabapentin (NEURONTIN) 300 mg capsule       Incontinence Supply Disposable (Briefs Overnight Medium) MISC Use in the morning 20 each 11    Insulin Pen Needle (PEN NEEDLES) 31G X 5 MM MISC by Does not apply route daily 100 each 5    lacosamide (Vimpat) 200 mg tablet Take 1 tablet (200 mg total) by mouth every 12 (twelve) hours 60 tablet 5    levETIRAcetam (KEPPRA) 750 mg tablet Take 2 tablets (1500mg) by mouth twice per day   112 tablet 11    levothyroxine 125 mcg tablet Take 1 tablet (125 mcg total) by mouth daily 90 tablet 1    lisinopril (ZESTRIL) 10 mg tablet Take 1 tablet (10 mg total) by mouth daily 90 tablet 1    metFORMIN (GLUCOPHAGE) 1000 MG tablet Take 1 tablet (1,000 mg total) by mouth 2 (two) times a day with meals 240 tablet 0    Multiple Vitamin (Daily-Reina) TABS Take 1 tablet by mouth daily Take at 8 AM 90 tablet 1    OneTouch Delica Lancets 21X MISC by Does not apply route daily TEST BLOOD SUGARS THREE TIMES DAILY 100 each 2    polyethylene glycol (MIRALAX) 17 g packet Take 17 g by mouth daily 14 each 8    QUEtiapine (SEROquel XR) 200 mg 24 hr tablet TAKE 1 TABLET BY MOUTH TWICE A DAY (8AM,2PM) 16 tablet 10    QUEtiapine (SEROquel XR) 400 mg 24 hr tablet TAKE 1 TABLET BY MOUTH AT BEDTIME (8PM) (DX: ANTIPSYCHOTIC) 90 tablet 1    simvastatin (ZOCOR) 40 mg tablet Take 1 tablet (40 mg total) by mouth daily 90 tablet 1    temazepam (RESTORIL) 15 mg capsule Take 1 capsule (15 mg total) by mouth daily at bedtime as needed for sleep 30 capsule 1    vitamin B-12 (VITAMIN B-12) 1,000 mcg tablet Take 1 tablet (1,000 mcg total) by mouth daily 90 tablet 1    Blood Glucose Monitoring Suppl (ONETOUCH VERIO) w/Device KIT by Does not apply route 3 (three) times a day TEST BLOOD SUGARS THREE TIMES (Patient not taking: No sig reported) 1 kit 0    Humidifiers (Cool Mist Humidifier 1 gallon) MISC Use daily at bedtime 1 each 0    sodium chloride (OCEAN) 0 65 % nasal spray 1 spray into each nostril as needed for congestion 15 mL 1     No current facility-administered medications for this visit         Current Problems    Active Problems:   Patient Active Problem List    Diagnosis Date Noted    Ambulatory dysfunction 03/29/2022    COVID-19 virus infection 03/28/2022    Pressure injury of left buttock, stage 4 ([facility] ) 03/09/2022    Moderate protein-calorie malnutrition ([facility] ) 12/22/2021    Buttock wound, left, subsequent encounter 11/05/2021    Social problem 09/16/2021    Hypomagnesemia 09/12/2021    Thrombocytopathia ([facility] ) 03/30/2020    Hyponatremia 09/28/2019    Severe sepsis ([facility] ) 09/25/2019    Acute cystitis 40/33/1818    Metabolic encephalopathy 11/13/5658    Seborrheic dermatitis of scalp 08/01/2019    Nearsightedness 08/01/2019    Behavior concern in adult 08/01/2019    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[facility] ) 08/01/2019    Vitamin B12 deficiency 03/08/2017    Hyperlipidemia 09/16/2016    Vitamin D deficiency 09/16/2016    Type 2 diabetes mellitus without complication, without long-term current use of insulin ([place] [address] ) 06/15/2016    Acquired hypothyroidism 06/15/2016    Cataract 11/22/2013    Cerebral palsy ([place] [address] ) 11/22/2013    Generalized convulsive epilepsy ([place] [address] ) 11/22/2013    Essential hypertension 11/22/2013    Osteoporosis 11/22/2013    Scoliosis 11/22/2013         Review of Systems:    General: negative for - chills, fatigue, fever,  weight gain or weight loss  Psychological: negative for - anxiety, behavioral disorder, concentration difficulties  Ophthalmic: negative for - blurry vision, decreased vision, double vision,      Past Medical History:   Past Medical History:   Diagnosis Date    ADRIANA (acute kidney injury) ([place] [address] ) 9/24/2019    Bacteremia due to Gram-positive bacteria 9/7/2021    COVID-19     CP [facility]cere[facility]     Depression     Diabetes ([place] [address] )     Disease of thyroid gland     Gait disorder     Gluteal abscess 9/6/2021    Hyperlipidemia     Hypertension     Kidney failure     Kidney stones     Osteoporosis     Psychiatric disorder     Scoliosis     Seizures ([place] [address] )     Sepsis ([place] [address] ) 9/25/2019    Thyroid disease     UTI (urinary tract infection)        Past Surgical History:   Past Surgical History:   Procedure Laterality Date    APPENDECTOMY      IR PICC PLACEMENT SINGLE LUMEN  9/13/2021    IR PICC PLACEMENT SINGLE LUMEN  9/16/2021       Family History:  Family history reviewed and non-contributory  History reviewed  No pertinent family history      Social History:  Social History     Socioeconomic History    Marital status: Single     Spouse name: None    Number of children: None    Years of education: None    Highest education level: None   Occupational History    None   Tobacco Use    Smoking status: Never Smoker    Smokeless tobacco: Never Used   Vaping Use    Vaping Use: Never used   Substance and Sexual Activity    Alcohol use: Never    Drug use: No    Sexual activity: Not Currently   Other Topics Concern    None   Social History Narrative    None     Social Determinants of Health     Financial Resource Strain: Not on file   Food Insecurity: No Food Insecurity    Worried About Running Out of Food in the Last Year: Never true    Melinda of Food in the Last Year: Never true   Transportation Needs: No Transportation Needs    Lack of Transportation (Medical): No    Lack of Transportation (Non-Medical): No   Physical Activity: Not on file   Stress: Not on file   Social Connections: Not on file   Intimate Partner Violence: Not on file   Housing Stability: Low Risk     Unable to Pay for Housing in the Last Year: No    Number of Places Lived in the Last Year: 1    Unstable Housing in the Last Year: No       Allergies: Allergies   Allergen Reactions    Erythromycin Other (See Comments)     Unknown per pt    Penicillins Other (See Comments)     Unknown per pt           Physical ExaminationBP 152/82   Pulse 84   Ht 6' (1 829 m)   Wt 69 9 kg (154 lb)   BMI 20 89 kg/m²   Gen: Alert and oriented to person, place, time  HEENT: EOMI, eyes clear, moist mucus membranes, hearing intact      Orthopedic Exam  Left wrist no tenderness or deformity  Xrays healed fx of radial styloid tip          Impression  Healed left wrist Fracture          Plan    No restrictions  WBAT  F/u prn    Machelle Davidson MD        Portions of the record may have been created with voice recognition software  Occasional wrong word or "sound a like" substitutions may have occurred due to the inherent limitations of voice recognition software  Read the chart carefully and recognize, using context, where substitutions have occurred

## 2022-05-23 ENCOUNTER — HOME CARE VISIT (OUTPATIENT)
Dept: HOME HEALTH SERVICES | Facility: HOME HEALTHCARE | Age: 57
End: 2022-05-23
Payer: MEDICARE

## 2022-05-23 PROCEDURE — G0299 HHS/HOSPICE OF RN EA 15 MIN: HCPCS

## 2022-05-23 PROCEDURE — 400014 VN F/U

## 2022-05-25 ENCOUNTER — HOME CARE VISIT (OUTPATIENT)
Dept: HOME HEALTH SERVICES | Facility: HOME HEALTHCARE | Age: 57
End: 2022-05-25
Payer: MEDICARE

## 2022-05-25 ENCOUNTER — OFFICE VISIT (OUTPATIENT)
Dept: NEUROLOGY | Facility: CLINIC | Age: 57
End: 2022-05-25
Payer: MEDICARE

## 2022-05-25 VITALS
SYSTOLIC BLOOD PRESSURE: 118 MMHG | OXYGEN SATURATION: 96 % | HEART RATE: 94 BPM | DIASTOLIC BLOOD PRESSURE: 68 MMHG | HEIGHT: 72 IN | TEMPERATURE: 97.5 F | BODY MASS INDEX: 20.32 KG/M2 | WEIGHT: 150 LBS

## 2022-05-25 DIAGNOSIS — G40.309 GENERALIZED CONVULSIVE EPILEPSY (HCC): Primary | ICD-10-CM

## 2022-05-25 PROCEDURE — 99215 OFFICE O/P EST HI 40 MIN: CPT | Performed by: NURSE PRACTITIONER

## 2022-05-25 RX ORDER — DIVALPROEX SODIUM 500 MG
TABLET, EXTENDED RELEASE 24 HR ORAL
Qty: 90 TABLET | Refills: 11 | Status: SHIPPED | OUTPATIENT
Start: 2022-05-25

## 2022-05-25 RX ORDER — DIVALPROEX SODIUM 250 MG/1
250 TABLET, EXTENDED RELEASE ORAL DAILY
Qty: 90 TABLET | Refills: 1 | Status: SHIPPED | OUTPATIENT
Start: 2022-05-25 | End: 2022-05-25 | Stop reason: ALTCHOICE

## 2022-05-25 RX ORDER — DIVALPROEX SODIUM 250 MG
250 TABLET, EXTENDED RELEASE 24 HR ORAL DAILY
Qty: 90 TABLET | Refills: 1 | Status: SHIPPED | OUTPATIENT
Start: 2022-05-25

## 2022-05-25 RX ORDER — CLONAZEPAM 0.25 MG/1
0.25 TABLET, ORALLY DISINTEGRATING ORAL AS NEEDED
Qty: 10 TABLET | Refills: 2 | Status: SHIPPED | OUTPATIENT
Start: 2022-05-25

## 2022-05-25 RX ORDER — LORATADINE 10 MG/1
10 TABLET ORAL DAILY
COMMUNITY

## 2022-05-25 RX ORDER — IBUPROFEN 600 MG/1
600 TABLET ORAL EVERY 6 HOURS PRN
COMMUNITY

## 2022-05-25 NOTE — ASSESSMENT & PLAN NOTE
Patient did have a breakthrough generalized tonic clonic seizure in the setting of active COVID-19 infection  Since this seizure was provoked, no medicaiton changes were made  He continues on Depakote -100 (BRAND ONLY), lacosamide 200 mg BID, and levetiracetam 1500 mg BID  He has not been taking clonazepam as his caretaker notes that he needs a refill  While there have not been any breakthrough convulsive seizures, he has been having frequent myoclonic jerks  He does fall frequently, possibly due to the myoclonic jerks in setting of already unsteady gait  Will have patient increase morning dose of Depakote ER to 750 mg and continue with 1000 mg at night  He will continue with levetiracetam 1500 mg BID and lacosamide 200 mg BID  Given refill of clonazepam and provided written instructions that this should be given for clusters of myoclonic jerks or with 2 or more single jerks in one day  Follow up in 3 months or sooner if needed  Check AED levels in one week

## 2022-05-25 NOTE — PROGRESS NOTES
Patient ID: Dev Rondon is a 56 y o  male with cerebral palsy, generalized epilepsy, and behavioral disorder, who is returning for follow-up of his seizures  Assessment/Plan:    Generalized convulsive epilepsy ([facility] )  Patient did have a breakthrough generalized tonic clonic seizure in the setting of active COVID-19 infection  Since this seizure was provoked, no medicaiton changes were made  He continues on Depakote -100 (BRAND ONLY), lacosamide 200 mg BID, and levetiracetam 1500 mg BID  He has not been taking clonazepam as his caretaker notes that he needs a refill  While there have not been any breakthrough convulsive seizures, he has been having frequent myoclonic jerks  He does fall frequently, possibly due to the myoclonic jerks in setting of already unsteady gait  Will have patient increase morning dose of Depakote ER to 750 mg and continue with 1000 mg at night  He will continue with levetiracetam 1500 mg BID and lacosamide 200 mg BID  Given refill of clonazepam and provided written instructions that this should be given for clusters of myoclonic jerks or with 2 or more single jerks in one day  Follow up in 3 months or sooner if needed  Check AED levels in one week  He will Return in about 3 months (around 8/25/2022) for Follow up with Dr Montrell Gant or Rashad Jeter  Subjective:  Dev Rondon is a 56 y o  male with cerebral palsy, generalized epilepsy, and behavioral disorder, who is returning for follow-up of his seizures  He was last seen via telemedicine by Dr Montrell Gant on 2/18/2022  At that time it was noted that there were not any recent GTC seizures but he did continue to have episodes of clusters of myoclonic seizures  They were not consistently using clonazepam for clusters so recommended that this medication was given for clusters  No other AED changes were made at that time  Recommended AED levels and follow up in 3 months       There was an ER visit on 3/28/22 for altered mental status, unsteady gait  Noted to be COVID-19 positive on PCR  Seen in the ER again the following day for breakthrough seizure  Staff reported that the seizure lasted about 10 minutes  Since his last visit, he did have the seizure when he had covid but no further convulsive seizures since  He has continued to have myclonic seizures  Caretaker reports that jerking occurs about every day  He does seem to fall a lot  It is unclear if he falls because of the jerk but sometimes it clearly is  He has not been getting PRN clonazepam as they ran out of this medication  He does have some behavioral issues  He gets upset when he doesn't get his way  He did put a hole in his door  Threatens to urinate on the floor sometimes as well  Current seizure medications:  - levetiracetam 1500 mg BID  - lacosamide 200 mg BID  - Depakote -1000  - clonazepam 0 25 mg PRN  Other medications as per Epic  Prior Seizure Medications: Valproate and Zonisamide (changed due to thrombocytopenia)    His history was also obtained from his caretaker, who was present at today's visit  I reviewed prior neurology notes, recent labs, as documented in Epic/CodeNgo, and summarized above  Objective:    Blood pressure 118/68, pulse 94, temperature 97 5 °F (36 4 °C), temperature source Temporal, height 6' (1 829 m), weight 68 kg (150 lb), SpO2 96 %  Physical Exam  No apparent distress  Appears well nourished  Mood appropriate for situation     Neurologic Exam  Mental status- alert and oriented to person, place, and time  Speech appropriate for conversation  Limited attention and knowledge of medication situation  Cranial Nerves- PERRL, EOMS normal, facial sensation and muscles symmetric, hearing intact bilaterally to finger rubs, tongue midline, palate rise symmetrical, shoulder shrug symmetrical     Motor- No pronator drift  Appropriate strength  Moves all extremities freely  No tremor   Occasional jerk of upper extremities  Sensory-  Intact distally in all extremities to light touch  DTRs- 2+ and brisk and symmetric in bilateral lower extremities, 2+ and symmetric in bilateral upper extremities  Gait- wide based unsteady gait  Coordination- FNF slow but intact  ROS:  Review of Systems   Constitutional: Negative  Negative for appetite change and fever  HENT: Negative  Negative for hearing loss, tinnitus, trouble swallowing and voice change  Eyes: Negative  Negative for photophobia and pain  Respiratory: Negative  Negative for shortness of breath  Cardiovascular: Negative  Negative for palpitations  Gastrointestinal: Negative  Negative for nausea and vomiting  Endocrine: Negative  Negative for cold intolerance  Genitourinary: Negative  Negative for dysuria, frequency and urgency  Musculoskeletal: Negative  Negative for myalgias and neck pain  Skin: Negative  Negative for rash  Neurological: Negative  Negative for dizziness, tremors, seizures, syncope, facial asymmetry, speech difficulty, weakness, light-headedness, numbness and headaches  Hematological: Negative  Does not bruise/bleed easily  Psychiatric/Behavioral: Negative  Negative for confusion, hallucinations and sleep disturbance         ROS obtained by MA and reviewed by myself  This note may have been created using voice recognition software  There may be unintentional errors such as grammatical errors, spelling errors, or pronoun errors

## 2022-05-25 NOTE — Clinical Note
CWON called and left message with visit time for today  Went to pt home and no answer  Called while outside and no answer  Checked in EPIC and pt had a neurology appt today  CWON will reschedule visit for next week

## 2022-05-25 NOTE — PATIENT INSTRUCTIONS
- Increase Depakote dose to 750 mg in the morning and 1000 mg at night - if he continues to have a lot of jerking, please call the office to let us know [phone]  - Continue vimpat 200 mg twice per day, levetiracetam 1500 mg twice per day  - Please give him a dose of clonazepam if he has a cluster of jerking movements or more than two single jerks in one day  - Have blood work done in one week  - Call the office if jerking becomes more frequent     - Follow up in 3 months with Dr Rk Jim

## 2022-05-26 PROCEDURE — G0179 MD RECERTIFICATION HHA PT: HCPCS | Performed by: NURSE PRACTITIONER

## 2022-05-27 VITALS
SYSTOLIC BLOOD PRESSURE: 130 MMHG | TEMPERATURE: 97.9 F | RESPIRATION RATE: 16 BRPM | DIASTOLIC BLOOD PRESSURE: 66 MMHG | HEART RATE: 88 BPM | OXYGEN SATURATION: 97 %

## 2022-05-30 ENCOUNTER — HOME CARE VISIT (OUTPATIENT)
Dept: HOME HEALTH SERVICES | Facility: HOME HEALTHCARE | Age: 57
End: 2022-05-30
Payer: MEDICARE

## 2022-05-30 VITALS
RESPIRATION RATE: 16 BRPM | SYSTOLIC BLOOD PRESSURE: 112 MMHG | OXYGEN SATURATION: 95 % | HEART RATE: 71 BPM | DIASTOLIC BLOOD PRESSURE: 68 MMHG | TEMPERATURE: 96.8 F

## 2022-05-30 PROCEDURE — G0299 HHS/HOSPICE OF RN EA 15 MIN: HCPCS

## 2022-06-01 ENCOUNTER — HOME CARE VISIT (OUTPATIENT)
Dept: HOME HEALTH SERVICES | Facility: HOME HEALTHCARE | Age: 57
End: 2022-06-01
Payer: MEDICARE

## 2022-06-01 VITALS
TEMPERATURE: 97.9 F | SYSTOLIC BLOOD PRESSURE: 110 MMHG | DIASTOLIC BLOOD PRESSURE: 64 MMHG | OXYGEN SATURATION: 98 % | RESPIRATION RATE: 20 BRPM | HEART RATE: 86 BPM

## 2022-06-01 PROCEDURE — G0299 HHS/HOSPICE OF RN EA 15 MIN: HCPCS

## 2022-06-01 NOTE — CASE COMMUNICATION
Call from Cuca Abad asking if the nurse could come earlier today  Barton Text sent to [facility] please call Yodit Macdonald at

## 2022-06-06 ENCOUNTER — HOME CARE VISIT (OUTPATIENT)
Dept: HOME HEALTH SERVICES | Facility: HOME HEALTHCARE | Age: 57
End: 2022-06-06
Payer: MEDICARE

## 2022-06-06 VITALS
DIASTOLIC BLOOD PRESSURE: 68 MMHG | TEMPERATURE: 96.7 F | SYSTOLIC BLOOD PRESSURE: 122 MMHG | OXYGEN SATURATION: 97 % | RESPIRATION RATE: 16 BRPM | HEART RATE: 68 BPM

## 2022-06-06 PROCEDURE — G0300 HHS/HOSPICE OF LPN EA 15 MIN: HCPCS

## 2022-06-08 ENCOUNTER — HOME CARE VISIT (OUTPATIENT)
Dept: HOME HEALTH SERVICES | Facility: HOME HEALTHCARE | Age: 57
End: 2022-06-08
Payer: MEDICARE

## 2022-06-08 VITALS
HEART RATE: 80 BPM | TEMPERATURE: 97.5 F | RESPIRATION RATE: 18 BRPM | SYSTOLIC BLOOD PRESSURE: 130 MMHG | DIASTOLIC BLOOD PRESSURE: 64 MMHG

## 2022-06-08 PROCEDURE — G0300 HHS/HOSPICE OF LPN EA 15 MIN: HCPCS

## 2022-06-13 ENCOUNTER — HOME CARE VISIT (OUTPATIENT)
Dept: HOME HEALTH SERVICES | Facility: HOME HEALTHCARE | Age: 57
End: 2022-06-13
Payer: MEDICARE

## 2022-06-13 VITALS
OXYGEN SATURATION: 95 % | HEART RATE: 88 BPM | TEMPERATURE: 96.6 F | DIASTOLIC BLOOD PRESSURE: 72 MMHG | RESPIRATION RATE: 20 BRPM | SYSTOLIC BLOOD PRESSURE: 128 MMHG

## 2022-06-13 PROCEDURE — G0299 HHS/HOSPICE OF RN EA 15 MIN: HCPCS

## 2022-06-15 ENCOUNTER — HOME CARE VISIT (OUTPATIENT)
Dept: HOME HEALTH SERVICES | Facility: HOME HEALTHCARE | Age: 57
End: 2022-06-15
Payer: MEDICARE

## 2022-06-15 VITALS
HEART RATE: 88 BPM | OXYGEN SATURATION: 96 % | TEMPERATURE: 96.6 F | DIASTOLIC BLOOD PRESSURE: 80 MMHG | RESPIRATION RATE: 20 BRPM | SYSTOLIC BLOOD PRESSURE: 132 MMHG

## 2022-06-15 PROCEDURE — G0299 HHS/HOSPICE OF RN EA 15 MIN: HCPCS

## 2022-06-20 ENCOUNTER — OFFICE VISIT (OUTPATIENT)
Dept: SURGERY | Facility: CLINIC | Age: 57
End: 2022-06-20
Payer: MEDICARE

## 2022-06-20 VITALS
RESPIRATION RATE: 16 BRPM | WEIGHT: 155.6 LBS | TEMPERATURE: 97.8 F | DIASTOLIC BLOOD PRESSURE: 68 MMHG | HEIGHT: 72 IN | HEART RATE: 94 BPM | BODY MASS INDEX: 21.08 KG/M2 | OXYGEN SATURATION: 97 % | SYSTOLIC BLOOD PRESSURE: 138 MMHG

## 2022-06-20 DIAGNOSIS — S31.829D BUTTOCK WOUND, LEFT, SUBSEQUENT ENCOUNTER: ICD-10-CM

## 2022-06-20 DIAGNOSIS — Z51.89 ENCOUNTER FOR WOUND CARE: Primary | ICD-10-CM

## 2022-06-20 PROCEDURE — 99212 OFFICE O/P EST SF 10 MIN: CPT | Performed by: SURGERY

## 2022-06-20 NOTE — PROGRESS NOTES
Progress Note - General Surgery   Autumn Cobb 56 y o  male MRN: [id number]  Encounter: [id number]    Assessment/Plan     56M with persistent left buttock wound after prior complex admission in 9/2021 for sepsis secondary to a left buttock abscess and pressure necrosis with tissue loss  Required incision and drainage, serial debridements, VAC therapy, prolonged antibiotics  Has small persistent residual wound with pink healthy granulation tissue  Wound improved in comparison to last visit but is not completely healed  He needs more offloading of the area to allow for complete healing and the VNA has been recommending this  We have been doing hydrofera blue dressings to the area since the last discussion with VNA  Much improved, but still not entirely healed  Advised continuing this regimen and increasing offloading/pressure as much as is possible  Return in 2 months or sooner if there are concerns  Subjective       Chief Complaint   Patient presents with    Wound Check      No fevers, no drainage, no infection  Wound improving  On hydrofera blue dressings 3 times a week  Patient still lays on the area frequently despite ongoing recommendations for offloading  Review of Systems   Constitutional: Negative for fever and unexpected weight change  Skin: Positive for wound  Negative for color change  Hematological: Negative for adenopathy  Does not bruise/bleed easily  All other systems reviewed and are negative  The following portions of the patient's history were reviewed and updated as appropriate: allergies, current medications, past family history, past medical history, past social history, past surgical history and problem list     Objective      Blood pressure 138/68, pulse 94, temperature 97 8 °F (36 6 °C), temperature source Temporal, resp  rate 16, height 6' (1 829 m), weight 70 6 kg (155 lb 9 6 oz), SpO2 97 %  Physical Exam  Vitals reviewed     Constitutional:       General: He is not in acute distress  HENT:      Mouth/Throat:      Mouth: Mucous membranes are moist    Eyes:      Extraocular Movements: Extraocular movements intact  Conjunctiva/sclera: Conjunctivae normal       Pupils: Pupils are equal, round, and reactive to light  Cardiovascular:      Rate and Rhythm: Normal rate  Pulmonary:      Effort: Pulmonary effort is normal  No respiratory distress  Skin:     General: Skin is warm and dry  Comments: Left buttock wound, smaller in size, more superficial, no significant hypergranulation tissue, clean, no purulence, dressing replaced, used calcium alginate as no hydrofera blue in the office today, 4 x 4, island dressing, additional medipore tape   Neurological:      Mental Status: He is alert and oriented to person, place, and time  Mental status is at baseline  Psychiatric:         Mood and Affect: Mood normal          Behavior: Behavior normal          Signature:   Jason Hansen MD  Date: 6/20/2022 Time: 10:57 AM

## 2022-06-22 ENCOUNTER — HOME CARE VISIT (OUTPATIENT)
Dept: HOME HEALTH SERVICES | Facility: HOME HEALTHCARE | Age: 57
End: 2022-06-22
Payer: MEDICARE

## 2022-06-22 VITALS
HEART RATE: 92 BPM | OXYGEN SATURATION: 96 % | DIASTOLIC BLOOD PRESSURE: 64 MMHG | TEMPERATURE: 96.4 F | SYSTOLIC BLOOD PRESSURE: 110 MMHG | RESPIRATION RATE: 20 BRPM

## 2022-06-22 PROCEDURE — 400014 VN F/U

## 2022-06-22 PROCEDURE — G0299 HHS/HOSPICE OF RN EA 15 MIN: HCPCS

## 2022-06-27 ENCOUNTER — HOME CARE VISIT (OUTPATIENT)
Dept: HOME HEALTH SERVICES | Facility: HOME HEALTHCARE | Age: 57
End: 2022-06-27
Payer: MEDICARE

## 2022-06-27 VITALS
DIASTOLIC BLOOD PRESSURE: 80 MMHG | SYSTOLIC BLOOD PRESSURE: 148 MMHG | TEMPERATURE: 97.7 F | RESPIRATION RATE: 20 BRPM | HEART RATE: 88 BPM | OXYGEN SATURATION: 95 %

## 2022-06-27 PROCEDURE — G0299 HHS/HOSPICE OF RN EA 15 MIN: HCPCS

## 2022-06-29 ENCOUNTER — HOME CARE VISIT (OUTPATIENT)
Dept: HOME HEALTH SERVICES | Facility: HOME HEALTHCARE | Age: 57
End: 2022-06-29
Payer: MEDICARE

## 2022-06-29 VITALS
OXYGEN SATURATION: 92 % | DIASTOLIC BLOOD PRESSURE: 76 MMHG | TEMPERATURE: 95 F | SYSTOLIC BLOOD PRESSURE: 120 MMHG | HEART RATE: 88 BPM

## 2022-06-29 PROCEDURE — G0299 HHS/HOSPICE OF RN EA 15 MIN: HCPCS

## 2022-07-04 ENCOUNTER — HOME CARE VISIT (OUTPATIENT)
Dept: HOME HEALTH SERVICES | Facility: HOME HEALTHCARE | Age: 57
End: 2022-07-04
Payer: MEDICARE

## 2022-07-04 NOTE — CASE COMMUNICATION
SN called to schedule snv for [facility] 7/4/22  Pts caregiver Svetlana Cameron stated she could do wound care for pt today and was agreeable to next snv on Wed 7/6/22  Decrease to 2 sn visits for week of 7/4/22

## 2022-07-05 ENCOUNTER — HOME CARE VISIT (OUTPATIENT)
Dept: HOME HEALTH SERVICES | Facility: HOME HEALTHCARE | Age: 57
End: 2022-07-05
Payer: MEDICARE

## 2022-07-05 NOTE — CASE COMMUNICATION
Called and spoke with caregiver Stormy Bonner regarding snv for tomorrow  Requested she do wound care tomorrow and VNA would visit on Friday  States that she would be able to do wc both Wednesday and Friday as VNA is not needed to visit on Friday  Request next SNV be Monday July 11, 2022

## 2022-07-07 ENCOUNTER — APPOINTMENT (OUTPATIENT)
Dept: LAB | Facility: CLINIC | Age: 57
End: 2022-07-07
Payer: MEDICARE

## 2022-07-07 DIAGNOSIS — E08.00 DIABETES MELLITUS DUE TO UNDERLYING CONDITION WITH HYPEROSMOLARITY WITHOUT COMA, WITHOUT LONG-TERM CURRENT USE OF INSULIN (HCC): ICD-10-CM

## 2022-07-07 DIAGNOSIS — G40.309 GENERALIZED CONVULSIVE EPILEPSY (HCC): ICD-10-CM

## 2022-07-07 DIAGNOSIS — Z12.5 SCREENING FOR MALIGNANT NEOPLASM OF PROSTATE: ICD-10-CM

## 2022-07-07 DIAGNOSIS — E78.5 HYPERLIPIDEMIA, UNSPECIFIED HYPERLIPIDEMIA TYPE: ICD-10-CM

## 2022-07-07 DIAGNOSIS — E03.9 ACQUIRED HYPOTHYROIDISM: ICD-10-CM

## 2022-07-07 LAB
ALBUMIN SERPL BCP-MCNC: 3.6 G/DL (ref 3.5–5)
ALP SERPL-CCNC: 49 U/L (ref 46–116)
ALT SERPL W P-5'-P-CCNC: 23 U/L (ref 12–78)
ANION GAP SERPL CALCULATED.3IONS-SCNC: 3 MMOL/L (ref 4–13)
AST SERPL W P-5'-P-CCNC: 17 U/L (ref 5–45)
BASOPHILS # BLD AUTO: 0.04 THOUSANDS/ΜL (ref 0–0.1)
BASOPHILS NFR BLD AUTO: 1 % (ref 0–1)
BILIRUB SERPL-MCNC: 0.2 MG/DL (ref 0.2–1)
BUN SERPL-MCNC: 21 MG/DL (ref 5–25)
CALCIUM SERPL-MCNC: 9.4 MG/DL (ref 8.3–10.1)
CHLORIDE SERPL-SCNC: 105 MMOL/L (ref 100–108)
CHOLEST SERPL-MCNC: 162 MG/DL
CO2 SERPL-SCNC: 30 MMOL/L (ref 21–32)
CREAT SERPL-MCNC: 0.76 MG/DL (ref 0.6–1.3)
EOSINOPHIL # BLD AUTO: 0.21 THOUSAND/ΜL (ref 0–0.61)
EOSINOPHIL NFR BLD AUTO: 3 % (ref 0–6)
ERYTHROCYTE [DISTWIDTH] IN BLOOD BY AUTOMATED COUNT: 13.6 % (ref 11.6–15.1)
GFR SERPL CREATININE-BSD FRML MDRD: 102 ML/MIN/1.73SQ M
GLUCOSE P FAST SERPL-MCNC: 77 MG/DL (ref 65–99)
HCT VFR BLD AUTO: 39.7 % (ref 36.5–49.3)
HCV AB SER QL: NORMAL
HDLC SERPL-MCNC: 53 MG/DL
HGB BLD-MCNC: 13 G/DL (ref 12–17)
IMM GRANULOCYTES # BLD AUTO: 0.02 THOUSAND/UL (ref 0–0.2)
IMM GRANULOCYTES NFR BLD AUTO: 0 % (ref 0–2)
LDLC SERPL CALC-MCNC: 95 MG/DL (ref 0–100)
LYMPHOCYTES # BLD AUTO: 3.78 THOUSANDS/ΜL (ref 0.6–4.47)
LYMPHOCYTES NFR BLD AUTO: 62 % (ref 14–44)
MCH RBC QN AUTO: 31.2 PG (ref 26.8–34.3)
MCHC RBC AUTO-ENTMCNC: 32.7 G/DL (ref 31.4–37.4)
MCV RBC AUTO: 95 FL (ref 82–98)
MONOCYTES # BLD AUTO: 0.63 THOUSAND/ΜL (ref 0.17–1.22)
MONOCYTES NFR BLD AUTO: 10 % (ref 4–12)
NEUTROPHILS # BLD AUTO: 1.48 THOUSANDS/ΜL (ref 1.85–7.62)
NEUTS SEG NFR BLD AUTO: 24 % (ref 43–75)
NRBC BLD AUTO-RTO: 0 /100 WBCS
PLATELET # BLD AUTO: 186 THOUSANDS/UL (ref 149–390)
PMV BLD AUTO: 11.9 FL (ref 8.9–12.7)
POTASSIUM SERPL-SCNC: 4 MMOL/L (ref 3.5–5.3)
PROT SERPL-MCNC: 7.6 G/DL (ref 6.4–8.2)
PSA SERPL-MCNC: 1.1 NG/ML (ref 0–4)
RBC # BLD AUTO: 4.17 MILLION/UL (ref 3.88–5.62)
SODIUM SERPL-SCNC: 138 MMOL/L (ref 136–145)
T4 FREE SERPL-MCNC: 0.75 NG/DL (ref 0.76–1.46)
TRIGL SERPL-MCNC: 71 MG/DL
TSH SERPL DL<=0.05 MIU/L-ACNC: 1.14 UIU/ML (ref 0.45–4.5)
VALPROATE SERPL-MCNC: 108 UG/ML (ref 50–100)
WBC # BLD AUTO: 6.16 THOUSAND/UL (ref 4.31–10.16)

## 2022-07-07 PROCEDURE — 87389 HIV-1 AG W/HIV-1&-2 AB AG IA: CPT

## 2022-07-07 PROCEDURE — 80177 DRUG SCRN QUAN LEVETIRACETAM: CPT

## 2022-07-07 PROCEDURE — G0103 PSA SCREENING: HCPCS

## 2022-07-07 PROCEDURE — 86803 HEPATITIS C AB TEST: CPT

## 2022-07-07 PROCEDURE — 80164 ASSAY DIPROPYLACETIC ACD TOT: CPT

## 2022-07-07 PROCEDURE — 80053 COMPREHEN METABOLIC PANEL: CPT

## 2022-07-07 PROCEDURE — 36415 COLL VENOUS BLD VENIPUNCTURE: CPT

## 2022-07-07 PROCEDURE — 80061 LIPID PANEL: CPT

## 2022-07-07 PROCEDURE — 85025 COMPLETE CBC W/AUTO DIFF WBC: CPT

## 2022-07-07 PROCEDURE — 83036 HEMOGLOBIN GLYCOSYLATED A1C: CPT

## 2022-07-07 PROCEDURE — 80235 DRUG ASSAY LACOSAMIDE: CPT

## 2022-07-07 PROCEDURE — 84443 ASSAY THYROID STIM HORMONE: CPT

## 2022-07-07 PROCEDURE — 84439 ASSAY OF FREE THYROXINE: CPT

## 2022-07-08 ENCOUNTER — TELEPHONE (OUTPATIENT)
Dept: UROLOGY | Facility: CLINIC | Age: 57
End: 2022-07-08

## 2022-07-08 LAB
EST. AVERAGE GLUCOSE BLD GHB EST-MCNC: 120 MG/DL
HBA1C MFR BLD: 5.8 %
HIV 1+2 AB+HIV1 P24 AG SERPL QL IA: NORMAL

## 2022-07-08 NOTE — TELEPHONE ENCOUNTER
Called and spoke with patients caregiver   Informed patients caregiver that psa came back normal at 1 1

## 2022-07-08 NOTE — TELEPHONE ENCOUNTER
----- Message from [address] [place] [address] sent at 7/8/2022  7:43 AM EDT -----  Please let patient know his PSA was normal

## 2022-07-09 LAB — LACOSAMIDE SERPL-MCNC: 4.9 UG/ML (ref 5–10)

## 2022-07-11 ENCOUNTER — HOME CARE VISIT (OUTPATIENT)
Dept: HOME HEALTH SERVICES | Facility: HOME HEALTHCARE | Age: 57
End: 2022-07-11
Payer: MEDICARE

## 2022-07-11 PROCEDURE — G0299 HHS/HOSPICE OF RN EA 15 MIN: HCPCS

## 2022-07-12 ENCOUNTER — TELEPHONE (OUTPATIENT)
Dept: NEUROLOGY | Facility: CLINIC | Age: 57
End: 2022-07-12

## 2022-07-12 VITALS
HEART RATE: 80 BPM | DIASTOLIC BLOOD PRESSURE: 76 MMHG | RESPIRATION RATE: 20 BRPM | TEMPERATURE: 97.5 F | SYSTOLIC BLOOD PRESSURE: 140 MMHG | OXYGEN SATURATION: 97 %

## 2022-07-12 DIAGNOSIS — G40.309 GENERALIZED CONVULSIVE EPILEPSY (HCC): ICD-10-CM

## 2022-07-12 LAB — LEVETIRACETAM SERPL-MCNC: 37.8 UG/ML (ref 10–40)

## 2022-07-12 NOTE — TELEPHONE ENCOUNTER
----- Message from Trista Hernández sent at 7/8/2022  8:50 AM EDT -----  Please see if he is still taking vimpat or missed any doses   His level was low    ----- Message -----  From: Lab, Background User  Sent: 7/7/2022   6:13 PM EDT  To: Trista Hernández

## 2022-07-12 NOTE — TELEPHONE ENCOUNTER
Called and spoke with patient's caregiver   She states patient is still taking his vimpat and has not missed any doses of medication     Caretaker states patient is doing well, has not had any seizures     Current dose:   lacosamide 200 mg BID

## 2022-07-12 NOTE — TELEPHONE ENCOUNTER
Received call from Jaciel William - states they need refills of vimpat 200 mg BID  Called and spoke with [employer] [facility]  Let her know there are 2 refills available

## 2022-07-13 ENCOUNTER — OFFICE VISIT (OUTPATIENT)
Dept: FAMILY MEDICINE CLINIC | Facility: CLINIC | Age: 57
End: 2022-07-13
Payer: MEDICARE

## 2022-07-13 ENCOUNTER — HOME CARE VISIT (OUTPATIENT)
Dept: HOME HEALTH SERVICES | Facility: HOME HEALTHCARE | Age: 57
End: 2022-07-13
Payer: MEDICARE

## 2022-07-13 VITALS
RESPIRATION RATE: 20 BRPM | OXYGEN SATURATION: 98 % | DIASTOLIC BLOOD PRESSURE: 80 MMHG | SYSTOLIC BLOOD PRESSURE: 142 MMHG | TEMPERATURE: 96.8 F | HEART RATE: 68 BPM

## 2022-07-13 VITALS
WEIGHT: 153 LBS | TEMPERATURE: 97.7 F | OXYGEN SATURATION: 97 % | BODY MASS INDEX: 20.72 KG/M2 | RESPIRATION RATE: 18 BRPM | SYSTOLIC BLOOD PRESSURE: 122 MMHG | HEART RATE: 78 BPM | HEIGHT: 72 IN | DIASTOLIC BLOOD PRESSURE: 70 MMHG

## 2022-07-13 DIAGNOSIS — G80.9 CEREBRAL PALSY, UNSPECIFIED TYPE (HCC): ICD-10-CM

## 2022-07-13 DIAGNOSIS — E08.00 DIABETES MELLITUS DUE TO UNDERLYING CONDITION WITH HYPEROSMOLARITY WITHOUT COMA, WITHOUT LONG-TERM CURRENT USE OF INSULIN (HCC): ICD-10-CM

## 2022-07-13 DIAGNOSIS — I10 ESSENTIAL HYPERTENSION, MALIGNANT: ICD-10-CM

## 2022-07-13 DIAGNOSIS — E03.9 ACQUIRED HYPOTHYROIDISM: ICD-10-CM

## 2022-07-13 DIAGNOSIS — G40.309 GENERALIZED CONVULSIVE EPILEPSY (HCC): Chronic | ICD-10-CM

## 2022-07-13 DIAGNOSIS — I10 ESSENTIAL HYPERTENSION: Chronic | ICD-10-CM

## 2022-07-13 DIAGNOSIS — E11.9 TYPE 2 DIABETES MELLITUS WITHOUT COMPLICATION, WITHOUT LONG-TERM CURRENT USE OF INSULIN (HCC): Primary | Chronic | ICD-10-CM

## 2022-07-13 PROBLEM — U07.1 COVID-19 VIRUS INFECTION: Status: RESOLVED | Noted: 2022-03-28 | Resolved: 2022-07-13

## 2022-07-13 PROBLEM — N30.00 ACUTE CYSTITIS: Status: RESOLVED | Noted: 2019-09-24 | Resolved: 2022-07-13

## 2022-07-13 PROCEDURE — 10330064 CLEANSER, WND SEA-CLEANS 6OZ  COLPLT

## 2022-07-13 PROCEDURE — 10330064 DRESSING, HYDROCELLULAR ADH STR FOAM 4"X

## 2022-07-13 PROCEDURE — G0299 HHS/HOSPICE OF RN EA 15 MIN: HCPCS

## 2022-07-13 PROCEDURE — 99214 OFFICE O/P EST MOD 30 MIN: CPT | Performed by: NURSE PRACTITIONER

## 2022-07-13 PROCEDURE — 10330064 WIPE, SKIN GEL PROT DRSNG (50/BX)

## 2022-07-13 PROCEDURE — 10330064

## 2022-07-13 PROCEDURE — 10330064 SPONGE, GAUZE 8PLY N/S 4"X4" (200/PK 20P

## 2022-07-13 NOTE — PATIENT INSTRUCTIONS
CARBOHYDRATES  As a carbohydrate is digested through our bodies it becomes a sugar  There are TWO main things I want you to know about CARBOHYDRATES:     1  NUMBER     How many carbohydrates are in each serving of food you are about to eat matters to weight loss/gain, diabetes control, and sugar levels  A food product is in the LOW carbohydrate level if it has less than 15grams carbohydrates per serving  These are always good choices in general for a snack  A meal can include anywhere from 15-45 grams carbohydrates in the meal       TIP: Sugar Free foods contain carbohydrates which become sugar in the body  If you are going to consider eating sugar free foods, you MUST follow serving size carefully  These foods will still result in high glucose levels  2  TYPE     The type of carbohydrate is VERY important  A slice of bread (white or wheat) will have same amount of carbohydrates but both break down at different paces in the body  Carbohydrates that are more complex like Rye, whole wheat, and multi grain process slower through our bodies, therefore, making sugar levels rise slowly and steady and over long period of time  WHITE carbohydrates such as white bread, white pasta and white rice digest quickly in your body and raise your glucose levels fast just like eating a candy bar  TIP: Read the INGREDIENTS on each item to make sure the first ingredient is WHOLE WHEAT or Rye  (not enriched white flour) rather than reading front label of the product in order to verify whole wheat product  Simple carbohydrates digest through your body QUICKLY causing the conversion of carbohydrates to become sugars if you are not using them immediately for energy  Complex carbohydrates will become sugars SLOWLY over time as your body breaks them down in your digestive system  Below is a graph demonstrating these two concepts    The rate of sugar breakdown can easily affect your energy, metabolism, ability to gain or lose weight and more  The goal is a slow and steady release of sugar into your body and to avoid sugar spikes in order to feel your best and manage your health  Simple Carbohydrates: Candy bar, white bread, white pasta, white rice, white flour, cookies, sweets, cakes, corn, string beans etc               A little information about carbs     ·         Try for a low carbohydrate diet  This means less than 100 grams per day  ·         Try to get a high amount of protein per day - shoot for 60-70 grams per day  ·         To really lose weight you may need to try an ultra low carb diet - this means 10-15 grams per meal  And 5 - 10 grams per snack  This is usually under the supervision of a physician weight loss program    ·         Carbohydrates are in starches and turn to sugars  Lower your intake of bread, pasta, potatoes, rice  Never drink your carbs - switch to water  No juice or soda  ·         When you do consume carbs, try for high fiber choices like fruits and veggies  Try for complex carbohydrates found in oats and whole grains  A daily fiber supplement can also be helpful  Here is a lot more information      Understanding Carbohydrates  A car needs the right type of fuel to run  And you need the right kind of food to function  To keep your energy level up, your body needs food that has carbohydrates  But carbohydrates raise blood sugar levels higher and faster than other kinds of food  Starches - AVOID   Starches are found in grains, some vegetables, and beans  Grain products include bread, pasta, cereal, and tortillas  Starchy vegetables include potatoes, peas, corn, lima beans, yams, and squash  Kidney beans, jackson beans, black beans, garbanzo beans, and lentils also contain starches  Sugars - AVOID  Sugars are found naturally in many foods  Or sugar can be added   Foods that contain natural sugar include fruits and fruit juices, dairy products, honey, and molasses  Added sugars are found in most desserts, processed foods, candy, regular soda, and fruit drinks  These are very helpful for treating low blood sugar, or hypoglycemia  They provide sugar quickly  Fiber - A BETTER CHOICE  Fiber comes from plant foods  Most fiber isnt digested by the body  Instead of raising blood sugar levels like other carbohydrates, it actually stops blood sugar from rising too fast  Fiber is found in fruits, vegetables, whole grains, beans, peas, and many nuts  Carb counting  Its important to keep track of the amount of carbohydrates you eat  This can help you keep the right balance of physical activity and medicine  The amount of carbohydrates needed will vary for each person  It depends on many things such as your health, the medicines you take, and how active you are  You may start with around 45 to 60 grams of carbohydrate per meal, depending on your situation  Counting your carbohydrates is a good way to control how many you eat  You can do this by keeping a food diary  There are great apps to help with this too like My Fitness Pal       Carbohydrates come from a variety of foods  These include grains, starchy vegetables, fruit, milk, beans, and snack foods  You can either count carbohydrate grams or carbohydrate servings  When you count carbohydrate servings, 1 carbohydrate serving = 15 grams of carbohydrates       Here are some examples of foods containing about 15 grams of carbohydrates (1 serving of carbohydrates):  ·      1/2 cup of canned or frozen fruit  ·      A small piece of fresh fruit (4 ounces)  ·      1 slice of bread  ·      1/2 cup of oatmeal  ·      1/3 cup of rice  ·      4 to 6 crackers  ·      1/2 English muffin  ·      1/2 cup of black beans  ·      1/4 of a large baked potato (3 ounces)  ·      2/3 cup of plain fat-free yogurt  ·      1 cup of soup  ·      1/2 cup of casserole  ·      6 chicken nuggets  ·      2-inch square brownie or cake without frosting  · 2 small cookies  ·      1/2 cup of ice cream or sherbet     Carb counting is easier when food labels are available  Look at the label to see how many grams of total carbohydrates the food contains  Then you can figure out how much you should eat  Its also important to be consistent with the amount and time you eat when taking a fixed dose of diabetes medicine  Make your meal plan  A meal plan gives guidelines for the types and amounts of food you should eat  Watch serving sizes  Your meal plan will group foods by servings  To learn how much a serving is, start by measuring food portions at each meal  Soon youll know what a serving looks like on your plate  A quick rule is a serving size of meat is about the size of your fist    Eat from all the food groups  The basis of a healthy meal plan is variety (eating lots of different foods)  Choose lean meats, fresh fruits and vegetables, whole grains, and low-fat or nonfat dairy products  Eating a wide variety of foods provides the nutrients your body needs  It can also keep you from getting bored with your meal plan  Learn about carbohydrates, fats, and protein  ·      Carbohydrates are starches, sugars, and fiber  They are found in many foods, including fruit, bread, pasta, milk, and sweets  Of all the foods you eat, carbohydrates have the most effect on your blood sugar  ·      Fats have the most calories  They also have the most effect on your weight and your risk of heart disease  Its important to control your weight and protect your heart  Foods that are high in fat include whole milk, cheese, snack foods, and desserts  ·      Protein is important for building and repairing muscles and bones  Choose low-fat protein sources, such as fish, egg whites, and skinless chicken  Reduce liquid sugars  Extra calories from sodas, sports drinks, and fruit drinks make it hard to keep blood sugar in range   Cut as many liquid sugars from your meal plan as you can   This includes most fruit juices, which are often high in natural or added sugar  Instead, drink plenty of water and other sugar-free beverages  Eat less fat  If you need to lose weight, try to reduce the amount of fat in your diet  This can also help lower your cholesterol level to keep blood vessels healthier  Cut fat by using only small amounts of liquid oil for cooking  Read food labels carefully to avoid foods with unhealthy trans fats  Timing your meals  When it comes to blood sugar control, when you eat is as important as what you eat  You may need to eat several small meals spaced evenly throughout the day to stay in your target range  So dont skip breakfast or wait until late in the day to get most of your calories  Doing so can cause your blood sugar to rise too high or fall too low  Understanding Carbohydrates, Fats, and Protein  Food is a source of fuel and nourishment for your body  Its also a source of pleasure  Having diabetes doesnt mean you have to eat special foods or give up desserts  Instead, your dietitian can show you how to plan meals to suit your body  To start, learn how different foods affect blood sugar  Carbohydrates  Carbohydrates are the main source of fuel for the body  Carbohydrates raise blood sugar  Many people think carbohydrates are only found in pasta or bread  But carbohydrates are actually in many kinds of foods:  ·      Sugars occur naturally in foods such as fruit, milk, honey, and molasses  Sugars can also be added to many foods, from cereals and yogurt to candy and desserts  Sugars raise blood sugar  ·      Starches are found in bread, cereals, pasta, and dried beans  Theyre also found in corn, peas, potatoes, yam, acorn squash, and butternut squash  Starches also raise blood sugar  ·      Fiber is found in foods such as vegetables, fruits, beans, and whole grains  Unlike other carbs, fiber isnt digested or absorbed   So it doesnt raise blood sugar  In fact, fiber can help keep blood sugar from rising too fast  It also helps keep blood cholesterol at a healthy level  Did you know? Even though carbohydrates raise blood sugar, its best to have some in every meal  They are an important part of a healthy diet  Fat  Fat is an energy source that can be stored until needed  Fat does not raise blood sugar  However, it can raise blood cholesterol, increasing the risk of heart disease  Fat is also high in calories, which can cause weight gain  Not all types of fat are the same  More Healthy:  ·      Monounsaturated fats are mostly found in vegetable oils, such as olive, canola, and peanut oils  They are also found in avocados and some nuts  Monounsaturated fats are healthy for your heart  Thats because they lower LDL (unhealthy) cholesterol  ·      Polyunsaturated fats are mostly found in vegetable oils, such as corn, safflower, and soybean oils  They are also found in some seeds, nuts, and fish  Polyunsaturated fats lower LDL (unhealthy) cholesterol  So, choosing them instead of saturated fats is healthy for your heart  Certain unsaturated fats can help lower triglycerides  Less Healthy:  ·      Saturated fats are found in animal products, such as meat, poultry, whole milk, lard, and butter  Saturated fats raise LDL cholesterol and are not healthy for your heart  ·      Hydrogenated oils and trans fats are formed when vegetable oils are processed into solid fats  They are found in many processed foods  Hydrogenated oils and trans fats raise LDL cholesterol and lower HDL (healthy) cholesterol  They are not healthy for your heart  Protein  Protein helps the body build and repair muscle and other tissue  Protein has little or no effect on blood sugar  However, many foods that contain protein also contain saturated fat   By choosing low-fat protein sources, you can get the benefits of protein without the extra fat:  ·      Plant protein is found in dry beans and peas, nuts, and soy products, such as tofu and soymilk  These sources tend to be cholesterol-free and low in saturated fat  ·      Animal protein is found in fish, poultry, meat, cheese, milk, and eggs  These contain cholesterol and can be high in saturated fat  Aim for lean, lower-fat choices  Reading food labels  Pay close attention to food labels  The information on them will help you choose healthy foods that make managing your blood sugar easier  Look for the Nutrition Facts label on packaged foods  This label tells you how many carbohydrates and how much sugar, fat, and fiber are in each serving  Then you can decide whether the food fits your meal plan  Reading food labels helps you keep track of your carbohydrate intake  Remember to pay attention to serving sizes  If you eat this entire box, he will have eaten 34 grams of carbohydrate  ·      Serving size: This number is very important and tells you how much of the food makes up a single serving  If you eat more than one serving, all other numbers, like calories and carbohydrates, also increase  ·      Total carbohydrates: This number tells you how much carbohydrate is in each serving  If you are carb counting, this number will help you fit the food into your meal plan  Also, keep in mind the number of servings you eat  If  you eat two servings, youll need to double the amount of carbohydrates listed on the box  ·      Sugars: This number includes both natural and added sugars  Sugars count as part of your carbohydrate intake, and are included in the total carbohydrate number on the label  ·      Fat: This number tells you the total amount of fat in each serving  Watch out for saturated fats, which raise cholesterol  Limit fats, especially if you are trying to lose weight  ·      Trans fat: This number tells you if the food includes trans fat  Liquid oils made into a solid fat, such as margarine, have a lot of trans fat   Trans fat is bad for the heart  Try to avoid foods containing trans fat  ·      Dietary fiber: This number tells you how much of the carbohydrate in the food is fiber  Foods high in fiber are healthy  They also help keep blood sugar levels steady  Learning portion sizes  Portion control is an important part of healthy eating  How much food you eat affects your blood sugar  And until you learn what portion sizes look like, use measuring cups and spoons to be sure portions are accurate  Adapted from:  © 5488-8013 [address], [address] [place], [place] [address]FarleyClarence Juárez  All rights reserved  This information is not intended as a substitute for professional medical care  Always follow your healthcare professional's instructions

## 2022-07-13 NOTE — PROGRESS NOTES
Assessment/Plan:    Problem List Items Addressed This Visit     Type 2 diabetes mellitus without complication, without long-term current use of insulin (HCC) - Primary (Chronic)    Relevant Medications    metFORMIN (GLUCOPHAGE) 1000 MG tablet    Other Relevant Orders    Microalbumin / creatinine urine ratio    HEMOGLOBIN A1C W/ EAG ESTIMATION    Generalized convulsive epilepsy ([facility] ) (Chronic)    Essential hypertension (Chronic)    Cerebral palsy ([facility] )    Acquired hypothyroidism    Relevant Orders    TSH, 3rd generation    T4, free      Other Visit Diagnoses     Essential hypertension, malignant        Diabetes mellitus due to underlying condition with hyperosmolarity without coma, without long-term current use of insulin (HCC)        Relevant Medications    metFORMIN (GLUCOPHAGE) 1000 MG tablet           Diagnoses and all orders for this visit:    Type 2 diabetes mellitus without complication, without long-term current use of insulin (HCC)  -     Microalbumin / creatinine urine ratio; Future  -     HEMOGLOBIN A1C W/ EAG ESTIMATION; Future    Essential hypertension, malignant  -     Ambulatory Referral to Home Health    Diabetes mellitus due to underlying condition with hyperosmolarity without coma, without long-term current use of insulin (HCC)  -     metFORMIN (GLUCOPHAGE) 1000 MG tablet; Take 1 tablet (1,000 mg total) by mouth 2 (two) times a day with meals    Essential hypertension    Cerebral palsy, unspecified type ([facility] )    Generalized convulsive epilepsy ([facility] )    Acquired hypothyroidism  -     TSH, 3rd generation; Future  -     T4, free; Future      No problem-specific Assessment & Plan notes found for this encounter  Subjective:      Patient ID: Liana Brothers is a 56 y o  male  Presents for a routine 4 month DM visit  Diabetes  He presents for his follow-up diabetic visit  He has type 2 diabetes mellitus  His disease course has been stable  There are no hypoglycemic associated symptoms   There are no diabetic associated symptoms  There are no hypoglycemic complications  Symptoms are stable  There are no diabetic complications  Risk factors for coronary artery disease include diabetes mellitus and male sex  Current diabetic treatment includes oral agent (monotherapy)  He is compliant with treatment all of the time  He is following a generally healthy diet  Meal planning includes avoidance of concentrated sweets  He never participates in exercise  An ACE inhibitor/angiotensin II receptor blocker is being taken  Eye exam is current  Hypertension  This is a chronic problem  The problem is controlled  There are no associated agents to hypertension  Risk factors for coronary artery disease include diabetes mellitus, male gender and dyslipidemia  Past treatments include ACE inhibitors  The current treatment provides significant improvement  Compliance problems include exercise  There is no history of angina or CAD/MI  Identifiable causes of hypertension include a thyroid problem  There is no history of chronic renal disease  Hyperlipidemia  This is a chronic problem  The problem is controlled  He has no history of chronic renal disease  There are no known factors aggravating his hyperlipidemia  Current antihyperlipidemic treatment includes statins  The current treatment provides significant improvement of lipids  There are no compliance problems  Risk factors for coronary artery disease include diabetes mellitus, dyslipidemia, hypertension, male sex and a sedentary lifestyle  Thyroid Problem  Presents for follow-up visit  The symptoms have been stable (Note T4 is low, employee from [employer] states that she gives thyroid mediciaton with breakfast  Advsied that Levothyroxine should be admininstered on empty stomach and avoid eating for 45-60 minutes  Will recheck in 6-18 weeks  )  His past medical history is significant for hyperlipidemia         A1c noted   Lab Results   Component Value Date    HGBA1C 5 8 (H) 07/07/2022        Recommend lifestyle and dietary changes which include plant based low glycemic diet  Will monitor in 4  months  The following portions of the patient's history were reviewed and updated as appropriate:   He has a past medical history of ADRIANA (acute kidney injury) ([facility] ) (9/24/2019), Bacteremia due to Gram-positive bacteria (9/7/2021), COVID-19, CP (cerebral palsy) ([facility] ), Depression, Diabetes ([facility] ), Disease of thyroid gland, Gait disorder, Gluteal abscess (9/6/2021), Hyperlipidemia, Hypertension, Kidney failure, Kidney stones, Osteoporosis, Psychiatric disorder, Scoliosis, Seizures ([facility] ), Sepsis ([facility] ) (9/25/2019), Thyroid disease, and UTI (urinary tract infection)  ,  does not have any pertinent problems on file  ,   has a past surgical history that includes Appendectomy; IR PICC placement single lumen (9/13/2021); and IR PICC placement single lumen (9/16/2021)  ,  family history is not on file  ,   reports that he has never smoked  He has never used smokeless tobacco  He reports that he does not drink alcohol and does not use drugs  ,  is allergic to erythromycin and penicillins     Current Outpatient Medications   Medication Sig Dispense Refill    benztropine (COGENTIN) 0 5 mg tablet TAKE 1 TABLET BY MOUTH TWICE A DAY (8AM,8PM) 56 tablet 4    calcium carbonate (OYSTER SHELL,OSCAL) 500 mg Take 1 tablet by mouth daily 90 tablet 1    cholecalciferol (VITAMIN D3) 1,000 units tablet Take 1 tablet (1,000 Units total) by mouth daily 90 tablet 1    clonazePAM (KlonoPIN) 0 25 MG disintegrating tablet Take 1 tablet (0 25 mg total) by mouth as needed for seizures (clusters of myoclonic jerking (more than 2 myoclonic jerks in a day)) for up to 1 dose 10 tablet 2    Depakote  MG 24 hr tablet Take 1 tablet (250 mg total) by mouth daily Take in the morning with one 500 mg tablet (total morning dose of 750 mg)   BRAND ONLY 90 tablet 1    Depakote  MG 24 hr tablet TAKE 1 TABLET BY MOUTH IN THE MORNING *BRAND NECESSARY*;TAKE 2 TABLETS BY MOUTH AT BEDTIME 90 tablet 11    docusate sodium (COLACE) 100 mg capsule TAKE 1 CAPSULE BY MOUTH TWICE A DAY (8AM,8PM) (DX: CONSTIPATION) 56 capsule 4    gabapentin (NEURONTIN) 100 mg capsule Take 100 mg by mouth 3 (three) times a day       gabapentin (NEURONTIN) 300 mg capsule       ibuprofen (MOTRIN) 600 mg tablet Take 600 mg by mouth every 6 (six) hours as needed for mild pain      Incontinence Supply Disposable (Briefs Overnight Medium) MISC Use in the morning 20 each 11    Insulin Pen Needle (PEN NEEDLES) 31G X 5 MM MISC by Does not apply route daily 100 each 5    lacosamide (Vimpat) 200 mg tablet Take 1 tablet (200 mg total) by mouth every 12 (twelve) hours 60 tablet 5    levETIRAcetam (KEPPRA) 750 mg tablet Take 2 tablets (1500mg) by mouth twice per day   112 tablet 11    levothyroxine 125 mcg tablet Take 1 tablet (125 mcg total) by mouth daily 90 tablet 1    lisinopril (ZESTRIL) 10 mg tablet Take 1 tablet (10 mg total) by mouth daily 90 tablet 1    loratadine (CLARITIN) 10 mg tablet Take 10 mg by mouth daily      metFORMIN (GLUCOPHAGE) 1000 MG tablet Take 1 tablet (1,000 mg total) by mouth 2 (two) times a day with meals 240 tablet 0    Multiple Vitamin (Daily-Reina) TABS Take 1 tablet by mouth daily Take at 8 AM 90 tablet 1    OneTouch Delica Lancets 06P MISC by Does not apply route daily TEST BLOOD SUGARS THREE TIMES DAILY 100 each 2    polyethylene glycol (MIRALAX) 17 g packet Take 17 g by mouth daily 14 each 8    QUEtiapine (SEROquel XR) 200 mg 24 hr tablet TAKE 1 TABLET BY MOUTH TWICE A DAY (8AM,2PM) 16 tablet 10    QUEtiapine (SEROquel XR) 400 mg 24 hr tablet TAKE 1 TABLET BY MOUTH AT BEDTIME (8PM) (DX: ANTIPSYCHOTIC) 90 tablet 1    simvastatin (ZOCOR) 40 mg tablet Take 1 tablet (40 mg total) by mouth daily 90 tablet 1    temazepam (RESTORIL) 15 mg capsule Take 1 capsule (15 mg total) by mouth daily at bedtime as needed for sleep 30 capsule 1    vitamin B-12 (VITAMIN B-12) 1,000 mcg tablet Take 1 tablet (1,000 mcg total) by mouth daily 90 tablet 1    Blood Glucose Monitoring Suppl (Carola Grissom) w/Device KIT by Does not apply route 3 (three) times a day TEST BLOOD SUGARS THREE TIMES (Patient not taking: No sig reported) 1 kit 0    Humidifiers (Cool Mist Humidifier 1 gallon) MISC Use daily at bedtime 1 each 0    sodium chloride (OCEAN) 0 65 % nasal spray 1 spray into each nostril as needed for congestion 15 mL 1     No current facility-administered medications for this visit  Review of Systems   All other systems reviewed and are negative  Objective:  Vitals:    07/13/22 1042   BP: 122/70   Pulse: 78   Resp: 18   Temp: 97 7 °F (36 5 °C)   SpO2: 97%   Weight: 69 4 kg (153 lb)   Height: 6' (1 829 m)     Body mass index is 20 75 kg/m²  Physical Exam  Vitals and nursing note reviewed  Constitutional:       Appearance: Normal appearance  He is well-developed  Interventions: Face mask in place  HENT:      Head: Normocephalic and atraumatic  Right Ear: Tympanic membrane, ear canal and external ear normal       Left Ear: Tympanic membrane, ear canal and external ear normal       Nose: Nose normal       Mouth/Throat:      Mouth: Mucous membranes are moist       Pharynx: Uvula midline  Eyes:      General: Lids are normal       Conjunctiva/sclera: Conjunctivae normal       Pupils: Pupils are equal, round, and reactive to light  Neck:      Thyroid: No thyroid mass  Vascular: No JVD  Trachea: Trachea and phonation normal    Cardiovascular:      Rate and Rhythm: Normal rate and regular rhythm  Pulses: Normal pulses  no weak pulses          Dorsalis pedis pulses are 2+ on the right side and 2+ on the left side  Posterior tibial pulses are 2+ on the right side and 2+ on the left side  Heart sounds: Normal heart sounds, S1 normal and S2 normal  No murmur heard  No friction rub   No gallop  Pulmonary:      Effort: Pulmonary effort is normal       Breath sounds: Normal breath sounds  Abdominal:      General: Bowel sounds are normal       Palpations: Abdomen is soft  Tenderness: There is no abdominal tenderness  Genitourinary:     Comments: Deferred  Musculoskeletal:         General: Normal range of motion  Cervical back: Full passive range of motion without pain, normal range of motion and neck supple  Right lower leg: No edema  Left lower leg: No edema  Feet:      Right foot:      Skin integrity: Warmth and dry skin present  No ulcer, skin breakdown, erythema or callus  Left foot:      Skin integrity: Warmth and dry skin present  No ulcer, skin breakdown, erythema or callus  Lymphadenopathy:      Head:      Right side of head: No submental, submandibular, tonsillar, preauricular, posterior auricular or occipital adenopathy  Left side of head: No submental, submandibular, tonsillar, preauricular, posterior auricular or occipital adenopathy  Cervical: No cervical adenopathy  Skin:     General: Skin is warm and dry  Capillary Refill: Capillary refill takes less than 2 seconds  Neurological:      General: No focal deficit present  Mental Status: He is alert and oriented to person, place, and time  Cranial Nerves: Cranial nerves are intact  Sensory: Sensation is intact  Motor: Motor function is intact  Coordination: Coordination is intact  Gait: Gait is intact  Psychiatric:         Attention and Perception: Attention and perception normal          Mood and Affect: Mood and affect normal          Speech: Speech normal          Behavior: Behavior normal  Behavior is cooperative  Thought Content: Thought content normal          Cognition and Memory: Cognition normal          Judgment: Judgment normal            Patient's shoes and socks removed      Right Foot/Ankle   Right Foot Inspection  Skin Exam: skin normal, skin intact, dry skin and warmth  No callus, no erythema, no maceration, no abnormal color, no pre-ulcer, no ulcer and no callus  Toe Exam: ROM and strength within normal limits  No swelling, no tenderness, erythema and  no right toe deformity    Sensory   Vibration: intact  Proprioception: intact  Monofilament testing: intact    Vascular  Capillary refills: < 3 seconds  The right DP pulse is 2+  The right PT pulse is 2+  Right Toe  - Comprehensive Exam  Ecchymosis: none  Arch: normal  Hammertoes: absent  Claw Toes: absent  Swelling: none   Tenderness: none         Left Foot/Ankle  Left Foot Inspection  Skin Exam: skin normal, skin intact, dry skin and warmth  No erythema, no maceration, normal color, no pre-ulcer, no ulcer and no callus  Toe Exam: ROM and strength within normal limits  No swelling, no tenderness, no erythema and no left toe deformity  Sensory   Vibration: intact  Proprioception: intact  Monofilament testing: intact    Vascular  Capillary refills: < 3 seconds  The left DP pulse is 2+  The left PT pulse is 2+       Left Toe  - Comprehensive Exam  Ecchymosis: none  Arch: normal  Hammertoes: absent  Claw toes: absent  Swelling: none   Tenderness: none           Assign Risk Category  No deformity present  No loss of protective sensation  No weak pulses  Risk: 0

## 2022-07-13 NOTE — CASE COMMUNICATION
Ship to [facility]    Wound 1      Full  XX  All items are ordered by the each unless otherwise noted    PLEASE DO NOT ORDER BY THE BOX  Request specific quantity needed  For Private Insurances order should be for a 2 week period      Jerzy Couch [id number]   1    Gauze 4x4 N/S 200 4x4s per unit  [id number]   1 pack    Transpore white  2in [id number]  2

## 2022-07-13 NOTE — CASE COMMUNICATION
Ship to Pt  Home     Insurance [facility]    Wound 1 left buttock   Full   All items are ordered by the each unless otherwise noted    PLEASE DO NOT ORDER BY THE BOX  Request specific quantity needed  For Private Insurances order should be for a 2 week period    Foam Dressings  Sub for Allevyn:  Hydrocellular Foam Adh, 4x4 [id number]   12    Adapt skin barrier no sting wipes 50 per box  [id number]  1 box

## 2022-07-14 ENCOUNTER — TELEPHONE (OUTPATIENT)
Dept: NEUROLOGY | Facility: CLINIC | Age: 57
End: 2022-07-14

## 2022-07-14 NOTE — TELEPHONE ENCOUNTER
----- Message from Trista Hernández sent at 7/13/2022 11:44 AM EDT -----  How have seizures/jerks been?  ----- Message -----  From: Lab, Background User  Sent: 7/7/2022   7:23 PM EDT  To: Trista Hernández

## 2022-07-14 NOTE — TELEPHONE ENCOUNTER
Called and spoke with Rory Wilson  States he is doing okay  Does continue to have some spontaneous jerking movements but notes there are less occurrences  She has no questions or concerns at this time

## 2022-07-18 ENCOUNTER — HOME CARE VISIT (OUTPATIENT)
Dept: HOME HEALTH SERVICES | Facility: HOME HEALTHCARE | Age: 57
End: 2022-07-18
Payer: MEDICARE

## 2022-07-18 VITALS
RESPIRATION RATE: 16 BRPM | HEART RATE: 76 BPM | OXYGEN SATURATION: 97 % | SYSTOLIC BLOOD PRESSURE: 126 MMHG | TEMPERATURE: 97.8 F | DIASTOLIC BLOOD PRESSURE: 74 MMHG

## 2022-07-18 PROCEDURE — G0300 HHS/HOSPICE OF LPN EA 15 MIN: HCPCS

## 2022-07-18 NOTE — CASE COMMUNICATION
Caregiver states pt not available for 7 20 22 sn visit and she can do wound care  Only 1 sn visit this week per caregiver request  Change in visit pattern noted

## 2022-07-20 ENCOUNTER — HOME CARE VISIT (OUTPATIENT)
Dept: HOME HEALTH SERVICES | Facility: HOME HEALTHCARE | Age: 57
End: 2022-07-20
Payer: MEDICARE

## 2022-07-21 NOTE — CASE COMMUNICATION
Skilled nursing discharge 7/98/98 due to recertification visit needing to be done by this date and SN has been unable to reach patient/caregiver after multiple phone calls and messages plus visit at house with no answer at door  Patients wound has been progressing well  If you believe there is a continued need for SN visits please add a new Ambulatory Referral for Home Health in [facility] and [facility]  [facility]  Thank you

## 2022-07-26 ENCOUNTER — TELEPHONE (OUTPATIENT)
Dept: SURGERY | Facility: CLINIC | Age: 57
End: 2022-07-26

## 2022-07-26 NOTE — TELEPHONE ENCOUNTER
Placed call to [facility] at [phone] to Susy Gardiner and left a message to return our call  Called yesterday and the girl said she would give us a call back, have not heard back yet  Per Dr Damaris Cortes:  "Let's call the patient/caregiver  Let's ask them if they can clean and maintain the wound care from here on out or if they require the VNA involvement  Let me know, thanks  Miriam Luna"          As patient has been discharged from VNA due to caregiver not answering phone or door when they arrive

## 2022-07-27 ENCOUNTER — TELEPHONE (OUTPATIENT)
Dept: SURGERY | Facility: CLINIC | Age: 57
End: 2022-07-27

## 2022-07-27 ENCOUNTER — HOME HEALTH ADMISSION (OUTPATIENT)
Dept: HOME HEALTH SERVICES | Facility: HOME HEALTHCARE | Age: 57
End: 2022-07-27
Payer: MEDICARE

## 2022-07-27 ENCOUNTER — HOME CARE VISIT (OUTPATIENT)
Dept: HOME HEALTH SERVICES | Facility: HOME HEALTHCARE | Age: 57
End: 2022-07-27

## 2022-07-27 DIAGNOSIS — S31.829D BUTTOCK WOUND, LEFT, SUBSEQUENT ENCOUNTER: Primary | ICD-10-CM

## 2022-07-27 NOTE — TELEPHONE ENCOUNTER
Pt called to find out the reason he was being seen for on aug 22nd and wanted to now if he would still be getting visiting nurses

## 2022-07-27 NOTE — CASE COMMUNICATION
Dr Celso Beltran, patient's care giver is requesting [place] to resume  Previous episode discontinued due to unable to locate patient at time of recertification  Please enter new ambulatory order for home health, skilled nursing if you are agreeble to the patient having a skilled need to restart home health  Thank you

## 2022-07-27 NOTE — TELEPHONE ENCOUNTER
Patient's [facility]  services called back requesting to resume VNA, orders sent

## 2022-07-28 ENCOUNTER — HOME CARE VISIT (OUTPATIENT)
Dept: HOME HEALTH SERVICES | Facility: HOME HEALTHCARE | Age: 57
End: 2022-07-28
Payer: MEDICARE

## 2022-07-28 PROCEDURE — 400013 VN SOC

## 2022-07-28 PROCEDURE — G0299 HHS/HOSPICE OF RN EA 15 MIN: HCPCS

## 2022-07-28 PROCEDURE — 10330081 VN NO-PAY CLAIM PROCEDURE

## 2022-07-30 VITALS
TEMPERATURE: 98.6 F | OXYGEN SATURATION: 98 % | SYSTOLIC BLOOD PRESSURE: 136 MMHG | BODY MASS INDEX: 20.72 KG/M2 | HEIGHT: 72 IN | WEIGHT: 153 LBS | HEART RATE: 72 BPM | DIASTOLIC BLOOD PRESSURE: 82 MMHG | RESPIRATION RATE: 20 BRPM

## 2022-07-30 NOTE — CASE COMMUNICATION
[facility] has admitted your patient to [address] [place] [address] service with the following disciplines:      SN  Primary focus of home health care pressure ulcer  Patient stated goals of care is for wound to heal   Anticipated visit pattern and next visit date 8/1/22  1xw1 then 2xw8  See medication list - meds in home  Significant clinical findings none  Potential barriers to goal achievement patient cognitive deficit  Clinical findings at [facility]  patient had a fall outside off a picnic bench 7/26/22 and scraped his knee  Patient refused any care to area and did not want a dressing applied  Thank you for allowing us to participate in the care of your patient

## 2022-08-01 ENCOUNTER — HOME CARE VISIT (OUTPATIENT)
Dept: HOME HEALTH SERVICES | Facility: HOME HEALTHCARE | Age: 57
End: 2022-08-01
Payer: MEDICARE

## 2022-08-01 PROCEDURE — G0299 HHS/HOSPICE OF RN EA 15 MIN: HCPCS

## 2022-08-02 VITALS
SYSTOLIC BLOOD PRESSURE: 142 MMHG | TEMPERATURE: 96.4 F | HEART RATE: 84 BPM | DIASTOLIC BLOOD PRESSURE: 80 MMHG | RESPIRATION RATE: 20 BRPM | OXYGEN SATURATION: 99 %

## 2022-08-03 ENCOUNTER — HOME CARE VISIT (OUTPATIENT)
Dept: HOME HEALTH SERVICES | Facility: HOME HEALTHCARE | Age: 57
End: 2022-08-03
Payer: MEDICARE

## 2022-08-03 VITALS
OXYGEN SATURATION: 99 % | HEART RATE: 84 BPM | TEMPERATURE: 97.2 F | RESPIRATION RATE: 20 BRPM | DIASTOLIC BLOOD PRESSURE: 80 MMHG | SYSTOLIC BLOOD PRESSURE: 148 MMHG

## 2022-08-03 PROCEDURE — G0299 HHS/HOSPICE OF RN EA 15 MIN: HCPCS

## 2022-08-08 ENCOUNTER — HOME CARE VISIT (OUTPATIENT)
Dept: HOME HEALTH SERVICES | Facility: HOME HEALTHCARE | Age: 57
End: 2022-08-08
Payer: MEDICARE

## 2022-08-08 PROCEDURE — G0299 HHS/HOSPICE OF RN EA 15 MIN: HCPCS

## 2022-08-09 VITALS
SYSTOLIC BLOOD PRESSURE: 128 MMHG | OXYGEN SATURATION: 97 % | RESPIRATION RATE: 20 BRPM | HEART RATE: 88 BPM | TEMPERATURE: 97.7 F | DIASTOLIC BLOOD PRESSURE: 76 MMHG

## 2022-08-10 ENCOUNTER — HOME CARE VISIT (OUTPATIENT)
Dept: HOME HEALTH SERVICES | Facility: HOME HEALTHCARE | Age: 57
End: 2022-08-10
Payer: MEDICARE

## 2022-08-10 VITALS
TEMPERATURE: 97.4 F | RESPIRATION RATE: 20 BRPM | OXYGEN SATURATION: 96 % | DIASTOLIC BLOOD PRESSURE: 68 MMHG | HEART RATE: 88 BPM | SYSTOLIC BLOOD PRESSURE: 126 MMHG

## 2022-08-10 PROCEDURE — G0299 HHS/HOSPICE OF RN EA 15 MIN: HCPCS

## 2022-08-15 ENCOUNTER — HOME CARE VISIT (OUTPATIENT)
Dept: HOME HEALTH SERVICES | Facility: HOME HEALTHCARE | Age: 57
End: 2022-08-15
Payer: MEDICARE

## 2022-08-15 PROCEDURE — G0180 MD CERTIFICATION HHA PATIENT: HCPCS | Performed by: SURGERY

## 2022-08-15 PROCEDURE — G0299 HHS/HOSPICE OF RN EA 15 MIN: HCPCS

## 2022-08-16 VITALS
TEMPERATURE: 95 F | SYSTOLIC BLOOD PRESSURE: 128 MMHG | OXYGEN SATURATION: 92 % | HEART RATE: 78 BPM | DIASTOLIC BLOOD PRESSURE: 68 MMHG | RESPIRATION RATE: 18 BRPM

## 2022-08-17 ENCOUNTER — HOME CARE VISIT (OUTPATIENT)
Dept: HOME HEALTH SERVICES | Facility: HOME HEALTHCARE | Age: 57
End: 2022-08-17
Payer: MEDICARE

## 2022-08-17 NOTE — CASE COMMUNICATION
Change in visit pattern for week of 8/15/22 to 1x week  Sn called caregiver Aden leger this morning to schedule SN visit for today and CG stated she does not feel patient needs a visit today since appt with Dr Rhina Little tomorrow 8/18/22

## 2022-08-18 ENCOUNTER — OFFICE VISIT (OUTPATIENT)
Dept: SURGERY | Facility: CLINIC | Age: 57
End: 2022-08-18
Payer: MEDICARE

## 2022-08-18 VITALS
BODY MASS INDEX: 21.78 KG/M2 | OXYGEN SATURATION: 97 % | DIASTOLIC BLOOD PRESSURE: 90 MMHG | SYSTOLIC BLOOD PRESSURE: 138 MMHG | RESPIRATION RATE: 16 BRPM | HEART RATE: 95 BPM | TEMPERATURE: 97.7 F | HEIGHT: 72 IN | WEIGHT: 160.8 LBS

## 2022-08-18 DIAGNOSIS — S31.829D BUTTOCK WOUND, LEFT, SUBSEQUENT ENCOUNTER: Primary | ICD-10-CM

## 2022-08-18 DIAGNOSIS — Z51.89 ENCOUNTER FOR WOUND CARE: ICD-10-CM

## 2022-08-18 PROCEDURE — 99212 OFFICE O/P EST SF 10 MIN: CPT | Performed by: SURGERY

## 2022-08-22 NOTE — PROGRESS NOTES
Progress Note - General Surgery   Emily Paulson 56 y o  male MRN: [id number]  Encounter: [id number]    Assessment/Plan     57M with severe left buttock pressure wound with necrosis and abscess that resulted in bacteremia, extended admission last year for IV antibiotics, serial debridements, [facility] wound care  After discharge, patient with very slow wound healing because of ongoing pressure injury to the area in his day to day life  The site has finally healed as of recent weeks, it looks great today, the wound is entirely healed and the skin is dry and covered  I explained to him and his caregiver that ongoing offloading and avoidance of pressure is the key to maintaining healthy skin and avoiding future pressure ulcers  He will call and return here on an as needed basis as the site is well healed  Subjective       Chief Complaint   Patient presents with    Wound Check      Wound has healed, no recurrent breakdown  No fevers  In communication with NICHELLE in recent weeks who is still seeing the patient for offloading dressings and encouragement of offloading and avoidance of additional pressure wounds  Review of Systems   Constitutional: Negative for activity change, appetite change, fatigue and fever  Skin: Negative for wound  Hematological: Negative for adenopathy  Does not bruise/bleed easily  All other systems reviewed and are negative  The following portions of the patient's history were reviewed and updated as appropriate: allergies, current medications, past family history, past medical history, past social history, past surgical history and problem list     Objective      Blood pressure 138/90, pulse 95, temperature 97 7 °F (36 5 °C), temperature source Temporal, resp  rate 16, height 6' (1 829 m), weight 72 9 kg (160 lb 12 8 oz), SpO2 97 %  Physical Exam  Vitals reviewed  Constitutional:       General: He is not in acute distress  HENT:      Head: Normocephalic        Mouth/Throat: Mouth: Mucous membranes are moist    Eyes:      Pupils: Pupils are equal, round, and reactive to light  Cardiovascular:      Rate and Rhythm: Normal rate  Pulmonary:      Effort: Pulmonary effort is normal  No respiratory distress  Musculoskeletal:         General: Normal range of motion  Cervical back: Normal range of motion  Skin:     General: Skin is warm and dry  Capillary Refill: Capillary refill takes less than 2 seconds  Neurological:      General: No focal deficit present  Mental Status: He is alert  Mental status is at baseline  Psychiatric:         Mood and Affect: Mood normal        Left buttock wound well healed, evidence of recent final stages of healing with some discoloration and thicker skin/scar overlying prior open wound, no ulcer or moist wound visible, no signs of cellulitis    Signature:   Jose Torres MD  Date: 8/22/2022 Time: 12:51 PM

## 2022-08-23 DIAGNOSIS — G40.309 GENERALIZED CONVULSIVE EPILEPSY (HCC): ICD-10-CM

## 2022-08-23 RX ORDER — LACOSAMIDE 200 MG/1
200 TABLET ORAL EVERY 12 HOURS SCHEDULED
Qty: 60 TABLET | Refills: 2 | Status: SHIPPED | OUTPATIENT
Start: 2022-08-23 | End: 2022-09-29

## 2022-08-23 NOTE — TELEPHONE ENCOUNTER
Received voicemail to refill lacosamide 200 mg every 12 hours  Call back [phone]  Patient of Link Manish, last visit May 25th    Due for refill please sign script if in agreement thank you

## 2022-08-24 ENCOUNTER — HOME CARE VISIT (OUTPATIENT)
Dept: HOME HEALTH SERVICES | Facility: HOME HEALTHCARE | Age: 57
End: 2022-08-24
Payer: MEDICARE

## 2022-08-24 VITALS
DIASTOLIC BLOOD PRESSURE: 80 MMHG | SYSTOLIC BLOOD PRESSURE: 132 MMHG | RESPIRATION RATE: 20 BRPM | HEART RATE: 76 BPM | OXYGEN SATURATION: 97 % | TEMPERATURE: 97.2 F

## 2022-08-24 PROCEDURE — G0299 HHS/HOSPICE OF RN EA 15 MIN: HCPCS

## 2022-08-24 NOTE — CASE COMMUNICATION
Patient discharged from [facility] service today  Goals are achieved and wound is healed  Instructed patient to follow up with PCP for any further issues

## 2022-09-08 PROCEDURE — 10330064 SALINE, IRR SOL STR 100ML (48/CS) MGM37

## 2022-10-11 DIAGNOSIS — I10 HYPERTENSION, UNSPECIFIED TYPE: ICD-10-CM

## 2022-10-11 DIAGNOSIS — E08.00 DIABETES MELLITUS DUE TO UNDERLYING CONDITION WITH HYPEROSMOLARITY WITHOUT COMA, WITHOUT LONG-TERM CURRENT USE OF INSULIN (HCC): ICD-10-CM

## 2022-10-11 DIAGNOSIS — E06.3 HYPOTHYROIDISM DUE TO HASHIMOTO'S THYROIDITIS: ICD-10-CM

## 2022-10-11 DIAGNOSIS — E03.8 HYPOTHYROIDISM DUE TO HASHIMOTO'S THYROIDITIS: ICD-10-CM

## 2022-10-11 DIAGNOSIS — E55.9 VITAMIN D DEFICIENCY: ICD-10-CM

## 2022-10-11 DIAGNOSIS — E53.8 B12 DEFICIENCY: ICD-10-CM

## 2022-10-11 RX ORDER — LEVOTHYROXINE SODIUM 0.12 MG/1
TABLET ORAL
Qty: 28 TABLET | Refills: 0 | Status: SHIPPED | OUTPATIENT
Start: 2022-10-11

## 2022-10-11 RX ORDER — MELATONIN
Qty: 28 TABLET | Refills: 0 | Status: SHIPPED | OUTPATIENT
Start: 2022-10-11

## 2022-10-11 RX ORDER — LISINOPRIL 10 MG/1
TABLET ORAL
Qty: 28 TABLET | Refills: 0 | Status: SHIPPED | OUTPATIENT
Start: 2022-10-11

## 2022-10-12 PROBLEM — A41.9 SEVERE SEPSIS (HCC): Status: RESOLVED | Noted: 2019-09-25 | Resolved: 2022-10-12

## 2022-10-12 PROBLEM — R65.20 SEVERE SEPSIS (HCC): Status: RESOLVED | Noted: 2019-09-25 | Resolved: 2022-10-12

## 2022-10-12 RX ORDER — LANOLIN ALCOHOL/MO/W.PET/CERES
CREAM (GRAM) TOPICAL
Qty: 28 TABLET | Refills: 0 | Status: SHIPPED | OUTPATIENT
Start: 2022-10-12

## 2022-11-09 ENCOUNTER — APPOINTMENT (OUTPATIENT)
Dept: LAB | Facility: CLINIC | Age: 57
End: 2022-11-09

## 2022-11-09 DIAGNOSIS — E03.9 ACQUIRED HYPOTHYROIDISM: ICD-10-CM

## 2022-11-09 DIAGNOSIS — E11.9 TYPE 2 DIABETES MELLITUS WITHOUT COMPLICATION, WITHOUT LONG-TERM CURRENT USE OF INSULIN (HCC): Chronic | ICD-10-CM

## 2022-11-09 DIAGNOSIS — Z12.5 SPECIAL SCREENING FOR MALIGNANT NEOPLASM OF PROSTATE: Primary | ICD-10-CM

## 2022-11-09 LAB
CHOLEST SERPL-MCNC: 158 MG/DL
CREAT UR-MCNC: 98.1 MG/DL
EST. AVERAGE GLUCOSE BLD GHB EST-MCNC: 120 MG/DL
HBA1C MFR BLD: 5.8 %
HDLC SERPL-MCNC: 69 MG/DL
LDLC SERPL CALC-MCNC: 74 MG/DL (ref 0–100)
MICROALBUMIN UR-MCNC: 9.7 MG/L (ref 0–20)
MICROALBUMIN/CREAT 24H UR: 10 MG/G CREATININE (ref 0–30)
PSA SERPL-MCNC: 0.7 NG/ML (ref 0–4)
T4 FREE SERPL-MCNC: 0.72 NG/DL (ref 0.76–1.46)
TRIGL SERPL-MCNC: 77 MG/DL
TSH SERPL DL<=0.05 MIU/L-ACNC: 0.88 UIU/ML (ref 0.45–4.5)

## 2022-11-11 DIAGNOSIS — Z11.1 SCREENING FOR TUBERCULOSIS: Primary | ICD-10-CM

## 2022-11-14 ENCOUNTER — APPOINTMENT (OUTPATIENT)
Dept: LAB | Facility: CLINIC | Age: 57
End: 2022-11-14

## 2022-11-14 DIAGNOSIS — Z11.1 SCREENING FOR TUBERCULOSIS: ICD-10-CM

## 2022-11-16 ENCOUNTER — OFFICE VISIT (OUTPATIENT)
Dept: FAMILY MEDICINE CLINIC | Facility: CLINIC | Age: 57
End: 2022-11-16

## 2022-11-16 VITALS
SYSTOLIC BLOOD PRESSURE: 130 MMHG | WEIGHT: 159 LBS | TEMPERATURE: 97.6 F | DIASTOLIC BLOOD PRESSURE: 80 MMHG | BODY MASS INDEX: 21.54 KG/M2 | HEIGHT: 72 IN

## 2022-11-16 DIAGNOSIS — E06.3 HYPOTHYROIDISM DUE TO HASHIMOTO'S THYROIDITIS: ICD-10-CM

## 2022-11-16 DIAGNOSIS — Z12.11 SCREENING FOR COLORECTAL CANCER: Primary | ICD-10-CM

## 2022-11-16 DIAGNOSIS — E03.8 HYPOTHYROIDISM DUE TO HASHIMOTO'S THYROIDITIS: ICD-10-CM

## 2022-11-16 DIAGNOSIS — E03.9 ACQUIRED HYPOTHYROIDISM: ICD-10-CM

## 2022-11-16 DIAGNOSIS — E08.00 DIABETES MELLITUS DUE TO UNDERLYING CONDITION WITH HYPEROSMOLARITY WITHOUT COMA, WITHOUT LONG-TERM CURRENT USE OF INSULIN (HCC): ICD-10-CM

## 2022-11-16 DIAGNOSIS — Z23 ENCOUNTER FOR IMMUNIZATION: ICD-10-CM

## 2022-11-16 DIAGNOSIS — Z12.12 SCREENING FOR COLORECTAL CANCER: Primary | ICD-10-CM

## 2022-11-16 LAB
GAMMA INTERFERON BACKGROUND BLD IA-ACNC: 0.03 IU/ML
M TB IFN-G BLD-IMP: NEGATIVE
M TB IFN-G CD4+ BCKGRND COR BLD-ACNC: -0.01 IU/ML
M TB IFN-G CD4+ BCKGRND COR BLD-ACNC: 0 IU/ML
MITOGEN IGNF BCKGRD COR BLD-ACNC: >10 IU/ML

## 2022-11-16 RX ORDER — LEVOTHYROXINE SODIUM 0.15 MG/1
150 TABLET ORAL
Qty: 90 TABLET | Refills: 0 | Status: SHIPPED | OUTPATIENT
Start: 2022-11-16

## 2022-11-16 NOTE — PROGRESS NOTES
Assessment/Plan:    Problem List Items Addressed This Visit     Acquired hypothyroidism    Relevant Medications    levothyroxine (Euthyrox) 150 mcg tablet   Other Visit Diagnoses     Screening for colorectal cancer    -  Primary    Relevant Orders    Ambulatory referral for colonoscopy    Encounter for immunization        Relevant Orders    Pneumococcal Conjugate Vaccine 20-valent (PCV20) (Completed)    Diabetes mellitus due to underlying condition with hyperosmolarity without coma, without long-term current use of insulin (HCC)        Relevant Medications    metFORMIN (GLUCOPHAGE) 1000 MG tablet    Hypothyroidism due to Hashimoto's thyroiditis        Relevant Medications    levothyroxine (Euthyrox) 150 mcg tablet    Other Relevant Orders    TSH, 3rd generation    T4, free           Diagnoses and all orders for this visit:    Screening for colorectal cancer  -     Ambulatory referral for colonoscopy; Future    Encounter for immunization  -     Pneumococcal Conjugate Vaccine 20-valent (PCV20)    Diabetes mellitus due to underlying condition with hyperosmolarity without coma, without long-term current use of insulin (HCC)  -     metFORMIN (GLUCOPHAGE) 1000 MG tablet; Take 1 tablet (1,000 mg total) by mouth 2 (two) times a day with meals    Acquired hypothyroidism    Hypothyroidism due to Hashimoto's thyroiditis  -     TSH, 3rd generation; Future  -     T4, free; Future  -     levothyroxine (Euthyrox) 150 mcg tablet; Take 1 tablet (150 mcg total) by mouth daily in the early morning        No problem-specific Assessment & Plan notes found for this encounter  Subjective:      Patient ID: Yuly Parent is a 56 y o  male  Presents for a routine 4 month DM visit  Diabetes  He presents for his follow-up diabetic visit  He has type 2 diabetes mellitus  His disease course has been stable  There are no hypoglycemic associated symptoms  There are no diabetic associated symptoms   There are no hypoglycemic complications  Symptoms are stable  There are no diabetic complications  Risk factors for coronary artery disease include diabetes mellitus, male sex, hypertension and sedentary lifestyle  Current diabetic treatment includes oral agent (monotherapy) and diet  He is compliant with treatment all of the time  He is following a generally healthy diet  Meal planning includes avoidance of concentrated sweets  He rarely participates in exercise  An ACE inhibitor/angiotensin II receptor blocker is being taken  Eye exam is current  Thyroid Problem  Presents for follow-up visit  The symptoms have been stable  Hypertension  This is a chronic problem  The problem is controlled  There are no associated agents to hypertension  Risk factors for coronary artery disease include male gender, dyslipidemia and diabetes mellitus  Past treatments include ACE inhibitors  The current treatment provides moderate improvement  There are no compliance problems  There is no history of angina or CAD/MI  Identifiable causes of hypertension include a thyroid problem  There is no history of chronic renal disease, hyperaldosteronism, hyperparathyroidism, a hypertension causing med or renovascular disease  A1c noted   Lab Results   Component Value Date    HGBA1C 5 8 (H) 11/09/2022        Recommend lifestyle and dietary changes which include plant based low glycemic diet  Will monitor in 4  months       The following portions of the patient's history were reviewed and updated as appropriate:   He has a past medical history of ADRIANA (acute kidney injury) ([facility] [address] ) (9/24/2019), Bacteremia due to Gram-positive bacteria (9/7/2021), COVID-19, CP (cerebral palsy) ([facility] [address] ), Depression, Diabetes ([facility] [address] ), Disease of thyroid gland, Gait disorder, Gluteal abscess (9/6/2021), Hyperlipidemia, Hypertension, Kidney failure, Kidney stones, Osteoporosis, Psychiatric disorder, Scoliosis, Seizures ([facility] [address] ), Sepsis ([facility] [address] ) (9/25/2019), Thyroid disease, and UTI (urinary tract infection)  ,  does not have any pertinent problems on file  ,   has a past surgical history that includes Appendectomy; IR PICC placement single lumen (9/13/2021); and IR PICC placement single lumen (9/16/2021)  ,  family history is not on file  ,   reports that he has never smoked  He has never used smokeless tobacco  He reports that he does not drink alcohol and does not use drugs  ,  is allergic to erythromycin and penicillins     Current Outpatient Medications   Medication Sig Dispense Refill   • benztropine (COGENTIN) 0 5 mg tablet TAKE 1 TABLET BY MOUTH TWICE A DAY (8AM,8PM) 56 tablet 4   • calcium carbonate (OYSTER SHELL,OSCAL) 500 mg Take 1 tablet by mouth daily 90 tablet 1   • cholecalciferol (VITAMIN D3) 1,000 units tablet GIVE 1 TABLET BY MOUTH DAILY FOR VITAMIN D DEFICIENCY DR DENIA AVERY 28 tablet 0   • clonazePAM (KlonoPIN) 0 25 MG disintegrating tablet Take 1 tablet (0 25 mg total) by mouth as needed for seizures (clusters of myoclonic jerking (more than 2 myoclonic jerks in a day)) for up to 1 dose 10 tablet 2   • Depakote  MG 24 hr tablet Take 1 tablet (250 mg total) by mouth daily Take in the morning with one 500 mg tablet (total morning dose of 750 mg)   BRAND ONLY 90 tablet 1   • Depakote  MG 24 hr tablet TAKE 1 TABLET BY MOUTH IN THE MORNING *BRAND NECESSARY*;TAKE 2 TABLETS BY MOUTH AT BEDTIME 90 tablet 11   • docusate sodium (COLACE) 100 mg capsule TAKE 1 CAPSULE BY MOUTH TWICE A DAY (8AM,8PM) (DX: CONSTIPATION) 56 capsule 4   • gabapentin (NEURONTIN) 100 mg capsule Take 100 mg by mouth 3 (three) times a day      • gabapentin (NEURONTIN) 300 mg capsule      • ibuprofen (MOTRIN) 600 mg tablet Take 600 mg by mouth every 6 (six) hours as needed for mild pain     • Incontinence Supply Disposable (Briefs Overnight Medium) MISC Use in the morning 20 each 11   • Insulin Pen Needle (PEN NEEDLES) 31G X 5 MM MISC by Does not apply route daily 100 each 5   • lacosamide (VIMPAT) 200 mg tablet GIVE 1 TABLET BY MOUTH EVERY 12 HOUR(S) FOR SEIZURE DISORDER 60 tablet 5   • levETIRAcetam (KEPPRA) 750 mg tablet Take 2 tablets (1500mg) by mouth twice per day  112 tablet 11   • levothyroxine (Euthyrox) 150 mcg tablet Take 1 tablet (150 mcg total) by mouth daily in the early morning 90 tablet 0   • lisinopril (ZESTRIL) 10 mg tablet GIVE 1 TABLET BY MOUTH DAILY FOR HYPERTENSION DR Khushboo Cruz 28 tablet 0   • loratadine (CLARITIN) 10 mg tablet Take 10 mg by mouth daily     • metFORMIN (GLUCOPHAGE) 1000 MG tablet Take 1 tablet (1,000 mg total) by mouth 2 (two) times a day with meals 240 tablet 0   • Multiple Vitamin (Daily-Reina) TABS Take 1 tablet by mouth daily Take at 8 AM 90 tablet 1   • OneTouch Delica Lancets 12A MISC by Does not apply route daily TEST BLOOD SUGARS THREE TIMES DAILY 100 each 2   • polyethylene glycol (MIRALAX) 17 g packet Take 17 g by mouth daily 14 each 8   • QUEtiapine (SEROquel XR) 200 mg 24 hr tablet TAKE 1 TABLET BY MOUTH TWICE A DAY (8AM,2PM) 16 tablet 10   • QUEtiapine (SEROquel XR) 400 mg 24 hr tablet TAKE 1 TABLET BY MOUTH AT BEDTIME (8PM) (DX: ANTIPSYCHOTIC) 90 tablet 1   • simvastatin (ZOCOR) 40 mg tablet Take 1 tablet (40 mg total) by mouth daily 90 tablet 1   • temazepam (RESTORIL) 15 mg capsule Take 1 capsule (15 mg total) by mouth daily at bedtime as needed for sleep 30 capsule 1   • vitamin B-12 (VITAMIN B-12) 1,000 mcg tablet GIVE 1 TABLET BY MOUTH DAILY FOR VITAMIN DEFICIENCY DR DENIA AVERY 28 tablet 0   • Blood Glucose Monitoring Suppl (ONETOUCH VERIO) w/Device KIT by Does not apply route 3 (three) times a day TEST BLOOD SUGARS THREE TIMES (Patient not taking: Reported on 3/9/2022) 1 kit 0   • Humidifiers (Cool Mist Humidifier 1 gallon) MISC Use daily at bedtime 1 each 0   • sodium chloride (OCEAN) 0 65 % nasal spray 1 spray into each nostril as needed for congestion 15 mL 1     No current facility-administered medications for this visit             Review of Systems   All other systems reviewed and are negative  Objective:  Vitals:    11/16/22 1015   BP: 130/80   BP Location: Left arm   Patient Position: Sitting   Cuff Size: Adult   Temp: 97 6 °F (36 4 °C)   TempSrc: Tympanic   Weight: 72 1 kg (159 lb)   Height: 6' (1 829 m)     Body mass index is 21 56 kg/m²  Physical Exam  Vitals and nursing note reviewed  Constitutional:       Appearance: Normal appearance  He is well-developed  Interventions: Face mask in place  HENT:      Head: Normocephalic and atraumatic  Right Ear: Tympanic membrane, ear canal and external ear normal       Left Ear: Tympanic membrane, ear canal and external ear normal       Nose: Nose normal       Mouth/Throat:      Mouth: Mucous membranes are moist       Pharynx: Uvula midline  Eyes:      General: Lids are normal       Conjunctiva/sclera: Conjunctivae normal       Pupils: Pupils are equal, round, and reactive to light  Neck:      Thyroid: No thyroid mass  Vascular: No JVD  Trachea: Trachea and phonation normal    Cardiovascular:      Rate and Rhythm: Normal rate and regular rhythm  Pulses: Normal pulses  Heart sounds: Normal heart sounds, S1 normal and S2 normal  No murmur heard  No friction rub  No gallop  Pulmonary:      Effort: Pulmonary effort is normal       Breath sounds: Normal breath sounds  Abdominal:      General: Bowel sounds are normal       Palpations: Abdomen is soft  Tenderness: There is no abdominal tenderness  Genitourinary:     Comments: Deferred  Musculoskeletal:         General: Normal range of motion  Cervical back: Full passive range of motion without pain, normal range of motion and neck supple  Right lower leg: No edema  Left lower leg: No edema  Lymphadenopathy:      Head:      Right side of head: No submental, submandibular, tonsillar, preauricular, posterior auricular or occipital adenopathy        Left side of head: No submental, submandibular, tonsillar, preauricular, posterior auricular or occipital adenopathy  Cervical: No cervical adenopathy  Skin:     General: Skin is warm and dry  Capillary Refill: Capillary refill takes less than 2 seconds  Neurological:      General: No focal deficit present  Mental Status: He is alert and oriented to person, place, and time  Cranial Nerves: Cranial nerves are intact  Sensory: Sensation is intact  Motor: Motor function is intact  Coordination: Coordination is intact  Gait: Gait is intact  Psychiatric:         Attention and Perception: Attention and perception normal          Mood and Affect: Mood and affect normal          Speech: Speech normal          Behavior: Behavior normal  Behavior is cooperative  Thought Content:  Thought content normal          Cognition and Memory: Cognition normal          Judgment: Judgment normal

## 2022-11-18 DIAGNOSIS — E78.5 HYPERLIPIDEMIA, UNSPECIFIED HYPERLIPIDEMIA TYPE: ICD-10-CM

## 2022-11-18 RX ORDER — SIMVASTATIN 40 MG
40 TABLET ORAL DAILY
Qty: 90 TABLET | Refills: 1 | Status: SHIPPED | OUTPATIENT
Start: 2022-11-18

## 2022-12-06 DIAGNOSIS — E06.3 HYPOTHYROIDISM DUE TO HASHIMOTO'S THYROIDITIS: ICD-10-CM

## 2022-12-06 DIAGNOSIS — E03.8 HYPOTHYROIDISM DUE TO HASHIMOTO'S THYROIDITIS: ICD-10-CM

## 2022-12-06 RX ORDER — LEVOTHYROXINE SODIUM 0.15 MG/1
TABLET ORAL
Qty: 30 TABLET | Refills: 1 | Status: SHIPPED | OUTPATIENT
Start: 2022-12-06

## 2022-12-29 RX ORDER — LEVOTHYROXINE SODIUM 0.12 MG/1
TABLET ORAL
COMMUNITY
Start: 2022-11-17

## 2022-12-29 RX ORDER — DIVALPROEX SODIUM 500 MG/1
TABLET, DELAYED RELEASE ORAL
COMMUNITY
Start: 2022-11-17 | End: 2023-03-13

## 2023-01-06 ENCOUNTER — OFFICE VISIT (OUTPATIENT)
Dept: UROLOGY | Facility: CLINIC | Age: 58
End: 2023-01-06

## 2023-01-06 VITALS
HEIGHT: 72 IN | HEART RATE: 72 BPM | DIASTOLIC BLOOD PRESSURE: 80 MMHG | BODY MASS INDEX: 21.54 KG/M2 | SYSTOLIC BLOOD PRESSURE: 130 MMHG | WEIGHT: 159 LBS

## 2023-01-06 DIAGNOSIS — Z12.5 PROSTATE CANCER SCREENING: Primary | ICD-10-CM

## 2023-01-06 LAB
SL AMB  POCT GLUCOSE, UA: ABNORMAL
SL AMB LEUKOCYTE ESTERASE,UA: ABNORMAL
SL AMB POCT BILIRUBIN,UA: ABNORMAL
SL AMB POCT BLOOD,UA: ABNORMAL
SL AMB POCT CLARITY,UA: CLEAR
SL AMB POCT COLOR,UA: YELLOW
SL AMB POCT KETONES,UA: ABNORMAL
SL AMB POCT NITRITE,UA: ABNORMAL
SL AMB POCT PH,UA: 6
SL AMB POCT SPECIFIC GRAVITY,UA: 1
SL AMB POCT URINE PROTEIN: ABNORMAL
SL AMB POCT UROBILINOGEN: 0.2

## 2023-01-09 ENCOUNTER — OFFICE VISIT (OUTPATIENT)
Dept: NEUROLOGY | Facility: CLINIC | Age: 58
End: 2023-01-09

## 2023-01-09 VITALS
WEIGHT: 165.4 LBS | HEART RATE: 68 BPM | BODY MASS INDEX: 22.43 KG/M2 | DIASTOLIC BLOOD PRESSURE: 80 MMHG | TEMPERATURE: 97.6 F | SYSTOLIC BLOOD PRESSURE: 118 MMHG

## 2023-01-09 DIAGNOSIS — G40.309 GENERALIZED CONVULSIVE EPILEPSY (HCC): Primary | ICD-10-CM

## 2023-01-09 RX ORDER — LACOSAMIDE 50 MG/1
50 TABLET ORAL EVERY 12 HOURS SCHEDULED
Qty: 180 TABLET | Refills: 0 | Status: SHIPPED | OUTPATIENT
Start: 2023-01-09 | End: 2023-01-10 | Stop reason: SDUPTHER

## 2023-01-09 NOTE — PATIENT INSTRUCTIONS
- Continue current dose of levetiracetam  - Continue current dose of Depakote (depending on upcoming level and how he does on the increased dose of lacosamide, we may decrease dose back down)  - Increase lacosamide to 250 mg twice per day (level has been historically low when checked in the past)  - If jerking becomes more frequent call the office or with bigger seizures let us know  - If you notice that the episodes of jerking are occurring close together (clustering) please give a dose of clonazepam  - Have blood work in one week (1/16/23 - have blood work done first thing in the morning prior to when morning medication is typically administered   After blood work is done, take morning medications)  - Follow up in 3 months with Dr Joanie Ba

## 2023-01-09 NOTE — PROGRESS NOTES
Patient ID: Reba Matias is a 56 y o  male with  with cerebral palsy, generalized epilepsy, and behavioral disorder, who is returning for follow-up of his seizures  Assessment/Plan:    Generalized convulsive epilepsy ([address] )  No breakthrough GTC/convulsive seizures since last visit  He is currently on Depakote -100 (BRAND ONLY), lacosamide 200 mg BID, and levetiracetam 1500 mg BID  There have been more frequent myoclonus though as historically occurs, clonazepam has not been administered even for clusters  Due to continues myoclonic jerks, will increase dose of lacosamide to 250 mg BID given prior levels have been lower than expected  If patient does well on increased dose of lacosamide, will consider decreasing Depakote as his levels since increased dose at last visit have been on the high side  Will continue current doses of levetiracetam and Depakote for now  Encouraged use of clonazepam for clusters of myoclonic jerks  Follow up in 3 months or sooner if needed  Check AED levels in one week  He will Return in about 3 months (around 4/9/2023) [facility]  Subjective:  Reba Matias is a 56 y o  male with  with cerebral palsy, generalized epilepsy, and behavioral disorder, who is returning for follow-up of his seizures  He was last seen in the office on 5/25/22  At that time, there was a breakthrough seizure in the setting of covid 19 infection  He was having myoclonic jerks  Recommended increasing morning Depakote to 750 mg and continuing with 1000 mg HS  Recommended AED levels in one week  He was to follow up in 3 months or sooner if needed  Caretaker is present today and reports that he jerks a lot  This morning for example, he was having a lot of jerking  Sometimes it goes away and comes back  Recently it has been happening every morning  He also agrees that it is happening more frequently  Otherwise he has been acting fine and doing well  Patient reports that he feels good  Caretaker feels that he is at his baseline  No new issues or concerns  No new medical issues or concerns  ROS + (reported to medical assistant): When asked about the following symptoms myself:  - dizziness - denies  - lightheadedness - denies  - weakness - denies  - tremor - denies  - numbness - denies  - headaches - denies      Current seizure medications:  - levetiraceatm 1500 mg BID  - lacosamide 200 mg BID  - Depakote -1000 (BRAND ONLY)  - clonazepam 0 25 mg PRN seizure  Other medications as per Epic  Latest Reference Range & Units 07/07/22 07:07   LEVETIRACETA (KEPPRA) 10 0 - 40 0 ug/mL 37 8   Lacosamide 5 0 - 10 0 ug/mL 4 9 (L)   VALPROIC ACID LEVEL, TOTAL  Rpt ! VALPROIC ACID TOTAL 50 - 100 ug/mL 108 (H)          Prior Seizure Medications: zonisamide (stopped due to thrombocytopenia)    His history was also obtained from his caretaker, who was present at today's visit  I reviewed prior neurology notes, most recent labs, as documented in Epic/Contacts+, and summarized above  Objective:    Blood pressure 118/80, pulse 68, temperature 97 6 °F (36 4 °C), temperature source Temporal, weight 75 kg (165 lb 6 4 oz)  Physical Exam  No apparent distress  Appears well nourished  Mood appropriate for situation     Neurologic Exam  Mental status- alert and oriented to person, place, and time  Speech with appropriate responses, slow at times  Limited attention and knowledge regarding medical situation  Cranial Nerves- PERRL, EOMS normal, facial sensation and muscles symmetric, hearing intact bilaterally to finger rubs, tongue midline, palate rise symmetrical, shoulder shrug symmetrical     Motor- No pronator drift  Appropriate strength  Moves all extremities freely  No tremor  Occasional jerk of upper extremities  Sensory-  Intact distally in all extremities to light touch  DTRs- 2+ and brisk in bilateral lower extremities   2+ and symmetric in bilateral upper extremities  Gait- wide based unsteady gait  Coordination- FNF slow but intact  ROS:  Review of Systems   Constitutional: Negative  Negative for appetite change and fever  HENT: Negative  Negative for hearing loss, tinnitus, trouble swallowing and voice change  Eyes: Negative  Negative for photophobia, pain and visual disturbance  Respiratory: Negative  Negative for shortness of breath  Cardiovascular: Negative  Negative for palpitations  Gastrointestinal: Negative  Negative for nausea and vomiting  Endocrine: Negative  Negative for cold intolerance  Genitourinary: Negative  Negative for dysuria, frequency and urgency  Musculoskeletal: Negative  Negative for gait problem, myalgias and neck pain  Skin: Negative  Negative for rash  Allergic/Immunologic: Negative  Neurological: Positive for dizziness, tremors, weakness, light-headedness, numbness and headaches  Negative for seizures, syncope, facial asymmetry and speech difficulty  Hematological: Negative  Does not bruise/bleed easily  Psychiatric/Behavioral: Negative  Negative for confusion, hallucinations and sleep disturbance  All other systems reviewed and are negative      ROS obtained by MA and reviewed by myself  This note may have been created using voice recognition software  There may be unintentional errors such as grammatical errors, spelling errors, or pronoun errors

## 2023-01-09 NOTE — PROGRESS NOTES
Review of Systems   Constitutional: Negative  Negative for appetite change and fever  HENT: Negative  Negative for hearing loss, tinnitus, trouble swallowing and voice change  Eyes: Negative  Negative for photophobia, pain and visual disturbance  Respiratory: Negative  Negative for shortness of breath  Cardiovascular: Negative  Negative for palpitations  Gastrointestinal: Negative  Negative for nausea and vomiting  Endocrine: Negative  Negative for cold intolerance  Genitourinary: Negative  Negative for dysuria, frequency and urgency  Musculoskeletal: Negative  Negative for gait problem, myalgias and neck pain  Skin: Negative  Negative for rash  Allergic/Immunologic: Negative  Neurological: Positive for dizziness, tremors, weakness, light-headedness, numbness and headaches  Negative for seizures, syncope, facial asymmetry and speech difficulty  Hematological: Negative  Does not bruise/bleed easily  Psychiatric/Behavioral: Negative  Negative for confusion, hallucinations and sleep disturbance  All other systems reviewed and are negative

## 2023-01-09 NOTE — ASSESSMENT & PLAN NOTE
No breakthrough GTC/convulsive seizures since last visit  He is currently on Depakote -100 (BRAND ONLY), lacosamide 200 mg BID, and levetiracetam 1500 mg BID  There have been more frequent myoclonus though as historically occurs, clonazepam has not been administered even for clusters  Due to continues myoclonic jerks, will increase dose of lacosamide to 250 mg BID given prior levels have been lower than expected  If patient does well on increased dose of lacosamide, will consider decreasing Depakote as his levels since increased dose at last visit have been on the high side  Will continue current doses of levetiracetam and Depakote for now  Encouraged use of clonazepam for clusters of myoclonic jerks  Follow up in 3 months or sooner if needed  Check AED levels in one week

## 2023-01-10 DIAGNOSIS — G40.309 GENERALIZED CONVULSIVE EPILEPSY (HCC): ICD-10-CM

## 2023-01-10 NOTE — TELEPHONE ENCOUNTER
[facility], i'm calling on behalf of Maria G Yost, who was seen yesterday in your office  He had a medication change the lacosamide  was changed to 250 milligrams twice a day  However, the medication, the script was sent to the wrong pharmacy  He is no longer affiliated with [facility]armacy in [place], he is now affiliated with [facility]  Please call me back on at [phone]  Again, I'm calling on behalf of Maria G Yost  And his date of birth is [birth year]  Thank you   Bye  Yes

## 2023-01-11 RX ORDER — LACOSAMIDE 50 MG/1
50 TABLET ORAL EVERY 12 HOURS SCHEDULED
Qty: 180 TABLET | Refills: 0 | Status: SHIPPED | OUTPATIENT
Start: 2023-01-11

## 2023-01-11 RX ORDER — LACOSAMIDE 200 MG/1
TABLET ORAL
Qty: 180 TABLET | Refills: 0 | Status: SHIPPED | OUTPATIENT
Start: 2023-01-11

## 2023-01-11 NOTE — TELEPHONE ENCOUNTER
Pt's friend left VM re: script sent to the wrong pharmacy  She states she called yesterday and never heard back  She is requesting a call back at # [phone]  Thank you  Transcribed VM:   Shaarthrenée Radha calling on behalf of Pollie Opitz [birth year]  I called yesterday, and I did not receive a call back  I am calling because the physicians assistant doctor sent his script to the wrong pharmacy and we can not get it  If you could please give me a call back at [phone]  I would greatly appreciate it again  This is my 2nd call the 2nd day being made  Thank you   Bye

## 2023-01-20 ENCOUNTER — APPOINTMENT (OUTPATIENT)
Dept: LAB | Facility: CLINIC | Age: 58
End: 2023-01-20

## 2023-01-20 DIAGNOSIS — G40.309 GENERALIZED CONVULSIVE EPILEPSY (HCC): ICD-10-CM

## 2023-01-20 DIAGNOSIS — E03.8 HYPOTHYROIDISM DUE TO HASHIMOTO'S THYROIDITIS: ICD-10-CM

## 2023-01-20 DIAGNOSIS — E03.9 ACQUIRED HYPOTHYROIDISM: Primary | ICD-10-CM

## 2023-01-20 DIAGNOSIS — E06.3 HYPOTHYROIDISM DUE TO HASHIMOTO'S THYROIDITIS: ICD-10-CM

## 2023-01-20 LAB
ALBUMIN SERPL BCP-MCNC: 3.5 G/DL (ref 3.5–5)
ALP SERPL-CCNC: 53 U/L (ref 46–116)
ALT SERPL W P-5'-P-CCNC: 16 U/L (ref 12–78)
ANION GAP SERPL CALCULATED.3IONS-SCNC: 4 MMOL/L (ref 4–13)
AST SERPL W P-5'-P-CCNC: 11 U/L (ref 5–45)
BASOPHILS # BLD AUTO: 0.04 THOUSANDS/ÂΜL (ref 0–0.1)
BASOPHILS NFR BLD AUTO: 1 % (ref 0–1)
BILIRUB SERPL-MCNC: 0.31 MG/DL (ref 0.2–1)
BUN SERPL-MCNC: 11 MG/DL (ref 5–25)
CALCIUM SERPL-MCNC: 9.1 MG/DL (ref 8.3–10.1)
CHLORIDE SERPL-SCNC: 102 MMOL/L (ref 96–108)
CO2 SERPL-SCNC: 31 MMOL/L (ref 21–32)
CREAT SERPL-MCNC: 0.74 MG/DL (ref 0.6–1.3)
EOSINOPHIL # BLD AUTO: 0.23 THOUSAND/ÂΜL (ref 0–0.61)
EOSINOPHIL NFR BLD AUTO: 4 % (ref 0–6)
ERYTHROCYTE [DISTWIDTH] IN BLOOD BY AUTOMATED COUNT: 13.3 % (ref 11.6–15.1)
GFR SERPL CREATININE-BSD FRML MDRD: 103 ML/MIN/1.73SQ M
GLUCOSE P FAST SERPL-MCNC: 80 MG/DL (ref 65–99)
HCT VFR BLD AUTO: 40.3 % (ref 36.5–49.3)
HGB BLD-MCNC: 13.2 G/DL (ref 12–17)
IMM GRANULOCYTES # BLD AUTO: 0.01 THOUSAND/UL (ref 0–0.2)
IMM GRANULOCYTES NFR BLD AUTO: 0 % (ref 0–2)
LYMPHOCYTES # BLD AUTO: 3.19 THOUSANDS/ÂΜL (ref 0.6–4.47)
LYMPHOCYTES NFR BLD AUTO: 48 % (ref 14–44)
MCH RBC QN AUTO: 31.6 PG (ref 26.8–34.3)
MCHC RBC AUTO-ENTMCNC: 32.8 G/DL (ref 31.4–37.4)
MCV RBC AUTO: 96 FL (ref 82–98)
MONOCYTES # BLD AUTO: 0.85 THOUSAND/ÂΜL (ref 0.17–1.22)
MONOCYTES NFR BLD AUTO: 13 % (ref 4–12)
NEUTROPHILS # BLD AUTO: 2.23 THOUSANDS/ÂΜL (ref 1.85–7.62)
NEUTS SEG NFR BLD AUTO: 34 % (ref 43–75)
NRBC BLD AUTO-RTO: 0 /100 WBCS
PLATELET # BLD AUTO: 129 THOUSANDS/UL (ref 149–390)
PMV BLD AUTO: 12.7 FL (ref 8.9–12.7)
POTASSIUM SERPL-SCNC: 4.1 MMOL/L (ref 3.5–5.3)
PROT SERPL-MCNC: 7.5 G/DL (ref 6.4–8.4)
RBC # BLD AUTO: 4.18 MILLION/UL (ref 3.88–5.62)
SODIUM SERPL-SCNC: 137 MMOL/L (ref 135–147)
T4 FREE SERPL-MCNC: 0.72 NG/DL (ref 0.76–1.46)
TSH SERPL DL<=0.05 MIU/L-ACNC: 3.06 UIU/ML (ref 0.45–4.5)
VALPROATE SERPL-MCNC: 92 UG/ML (ref 50–100)
WBC # BLD AUTO: 6.55 THOUSAND/UL (ref 4.31–10.16)

## 2023-01-23 LAB — LEVETIRACETAM SERPL-MCNC: 37.1 UG/ML (ref 10–40)

## 2023-01-24 ENCOUNTER — EVALUATION (OUTPATIENT)
Dept: OCCUPATIONAL THERAPY | Facility: CLINIC | Age: 58
End: 2023-01-24

## 2023-01-24 DIAGNOSIS — S62.102D CLOSED FRACTURE OF LEFT WRIST WITH ROUTINE HEALING, SUBSEQUENT ENCOUNTER: Primary | ICD-10-CM

## 2023-01-24 LAB
LACOSAMIDE SERPL-MCNC: 9.4 UG/ML (ref 5–10)
MISCELLANEOUS LAB TEST RESULT: NORMAL

## 2023-01-24 NOTE — PROGRESS NOTES
1/24/23: Patient presented for therapy, but no prescription available, so therapist unable to treat  Dr Boss released patient from care in May 2022 without restrictions  Caregiver reports no change in ADL performance or complaints of pain  AROM observed to be [facility]  No OT indicated at this time

## 2023-01-26 ENCOUNTER — TELEPHONE (OUTPATIENT)
Dept: NEUROLOGY | Facility: CLINIC | Age: 58
End: 2023-01-26

## 2023-01-26 DIAGNOSIS — G40.309 GENERALIZED CONVULSIVE EPILEPSY (HCC): Primary | ICD-10-CM

## 2023-01-26 NOTE — TELEPHONE ENCOUNTER
----- Message from Trista Hernández sent at 1/25/2023  5:25 PM EST -----  Levels look good but platelet count was low   Should be rechecked in about one month    ----- Message -----  From: Lab, Background User  Sent: 1/20/2023   1:46 PM EST  To: HUNTER Salazar

## 2023-01-30 NOTE — TELEPHONE ENCOUNTER
Spoke to caregiver Alpena Dust  Verbalized understanding with results and recommendations  New order for CBC placed

## 2023-02-14 NOTE — TELEPHONE ENCOUNTER
Did you send me these meds? I dont prescribe haldol to him and I cant delete that med, Can you? Hide Additional Notes?: No Detail Level: Detailed

## 2023-02-23 ENCOUNTER — APPOINTMENT (OUTPATIENT)
Dept: LAB | Facility: CLINIC | Age: 58
End: 2023-02-23

## 2023-02-23 DIAGNOSIS — E03.9 ACQUIRED HYPOTHYROIDISM: ICD-10-CM

## 2023-02-23 DIAGNOSIS — G40.309 GENERALIZED CONVULSIVE EPILEPSY (HCC): ICD-10-CM

## 2023-02-23 LAB
BASOPHILS # BLD AUTO: 0.06 THOUSANDS/ÂΜL (ref 0–0.1)
BASOPHILS NFR BLD AUTO: 1 % (ref 0–1)
EOSINOPHIL # BLD AUTO: 0.59 THOUSAND/ÂΜL (ref 0–0.61)
EOSINOPHIL NFR BLD AUTO: 9 % (ref 0–6)
ERYTHROCYTE [DISTWIDTH] IN BLOOD BY AUTOMATED COUNT: 13.1 % (ref 11.6–15.1)
HCT VFR BLD AUTO: 37.8 % (ref 36.5–49.3)
HGB BLD-MCNC: 12.4 G/DL (ref 12–17)
IMM GRANULOCYTES # BLD AUTO: 0.01 THOUSAND/UL (ref 0–0.2)
IMM GRANULOCYTES NFR BLD AUTO: 0 % (ref 0–2)
LYMPHOCYTES # BLD AUTO: 3.23 THOUSANDS/ÂΜL (ref 0.6–4.47)
LYMPHOCYTES NFR BLD AUTO: 50 % (ref 14–44)
MCH RBC QN AUTO: 31.1 PG (ref 26.8–34.3)
MCHC RBC AUTO-ENTMCNC: 32.8 G/DL (ref 31.4–37.4)
MCV RBC AUTO: 95 FL (ref 82–98)
MONOCYTES # BLD AUTO: 0.7 THOUSAND/ÂΜL (ref 0.17–1.22)
MONOCYTES NFR BLD AUTO: 11 % (ref 4–12)
NEUTROPHILS # BLD AUTO: 1.91 THOUSANDS/ÂΜL (ref 1.85–7.62)
NEUTS SEG NFR BLD AUTO: 29 % (ref 43–75)
NRBC BLD AUTO-RTO: 0 /100 WBCS
PLATELET # BLD AUTO: 175 THOUSANDS/UL (ref 149–390)
PMV BLD AUTO: 11.7 FL (ref 8.9–12.7)
RBC # BLD AUTO: 3.99 MILLION/UL (ref 3.88–5.62)
T4 FREE SERPL-MCNC: 1.26 NG/DL (ref 0.76–1.46)
TSH SERPL DL<=0.05 MIU/L-ACNC: 0.02 UIU/ML (ref 0.45–4.5)
WBC # BLD AUTO: 6.5 THOUSAND/UL (ref 4.31–10.16)

## 2023-02-27 DIAGNOSIS — E06.3 HYPOTHYROIDISM DUE TO HASHIMOTO'S THYROIDITIS: ICD-10-CM

## 2023-02-27 DIAGNOSIS — E03.8 HYPOTHYROIDISM DUE TO HASHIMOTO'S THYROIDITIS: ICD-10-CM

## 2023-02-27 DIAGNOSIS — E78.5 HYPERLIPIDEMIA, UNSPECIFIED HYPERLIPIDEMIA TYPE: ICD-10-CM

## 2023-02-27 RX ORDER — LEVOTHYROXINE SODIUM 0.15 MG/1
TABLET ORAL
Qty: 28 TABLET | Refills: 3 | Status: SHIPPED | OUTPATIENT
Start: 2023-02-27

## 2023-02-27 RX ORDER — SIMVASTATIN 40 MG
TABLET ORAL
Qty: 28 TABLET | Refills: 3 | Status: SHIPPED | OUTPATIENT
Start: 2023-02-27

## 2023-03-17 RX ORDER — HYDROXYZINE PAMOATE 25 MG/1
CAPSULE ORAL
COMMUNITY
Start: 2023-03-01

## 2023-03-17 RX ORDER — POLYETHYLENE GLYCOL-3350 AND ELECTROLYTES 236; 6.74; 5.86; 2.97; 22.74 G/274.31G; G/274.31G; G/274.31G; G/274.31G; G/274.31G
POWDER, FOR SOLUTION ORAL
COMMUNITY
Start: 2023-02-15

## 2023-03-22 ENCOUNTER — OFFICE VISIT (OUTPATIENT)
Dept: FAMILY MEDICINE CLINIC | Facility: CLINIC | Age: 58
End: 2023-03-22

## 2023-03-22 VITALS
HEART RATE: 68 BPM | TEMPERATURE: 97.5 F | OXYGEN SATURATION: 97 % | WEIGHT: 155 LBS | BODY MASS INDEX: 20.99 KG/M2 | DIASTOLIC BLOOD PRESSURE: 86 MMHG | HEIGHT: 72 IN | SYSTOLIC BLOOD PRESSURE: 132 MMHG

## 2023-03-22 DIAGNOSIS — Z11.1 SCREENING FOR TUBERCULOSIS: ICD-10-CM

## 2023-03-22 DIAGNOSIS — E78.5 HYPERLIPIDEMIA, UNSPECIFIED HYPERLIPIDEMIA TYPE: ICD-10-CM

## 2023-03-22 DIAGNOSIS — R26.2 AMBULATORY DYSFUNCTION: ICD-10-CM

## 2023-03-22 DIAGNOSIS — G80.9 CEREBRAL PALSY, UNSPECIFIED TYPE (HCC): ICD-10-CM

## 2023-03-22 DIAGNOSIS — G83.89 CEREBRAL PARESIS WITH HOMOLATERAL ATAXIA (HCC): ICD-10-CM

## 2023-03-22 DIAGNOSIS — K59.00 CONSTIPATION, UNSPECIFIED CONSTIPATION TYPE: ICD-10-CM

## 2023-03-22 DIAGNOSIS — E08.00 DIABETES MELLITUS DUE TO UNDERLYING CONDITION WITH HYPEROSMOLARITY WITHOUT COMA, WITHOUT LONG-TERM CURRENT USE OF INSULIN (HCC): ICD-10-CM

## 2023-03-22 DIAGNOSIS — Z12.11 SCREENING FOR COLON CANCER: ICD-10-CM

## 2023-03-22 DIAGNOSIS — E55.9 VITAMIN D DEFICIENCY: ICD-10-CM

## 2023-03-22 DIAGNOSIS — E11.9 TYPE 2 DIABETES MELLITUS WITHOUT COMPLICATION, WITHOUT LONG-TERM CURRENT USE OF INSULIN (HCC): Primary | Chronic | ICD-10-CM

## 2023-03-22 PROBLEM — E44.0 MODERATE PROTEIN-CALORIE MALNUTRITION (HCC): Status: RESOLVED | Noted: 2021-12-22 | Resolved: 2023-03-22

## 2023-03-22 PROBLEM — S31.829D BUTTOCK WOUND, LEFT, SUBSEQUENT ENCOUNTER: Status: RESOLVED | Noted: 2021-11-05 | Resolved: 2023-03-22

## 2023-03-22 PROBLEM — L89.324 PRESSURE INJURY OF LEFT BUTTOCK, STAGE 4 (HCC): Status: RESOLVED | Noted: 2022-03-09 | Resolved: 2023-03-22

## 2023-03-22 LAB — SL AMB POCT HEMOGLOBIN AIC: 5.6 (ref ?–6.5)

## 2023-03-22 RX ORDER — MELATONIN
1000 DAILY
Qty: 28 TABLET | Refills: 0 | Status: SHIPPED | OUTPATIENT
Start: 2023-03-22

## 2023-03-22 RX ORDER — B-COMPLEX WITH VITAMIN C
1 TABLET ORAL
COMMUNITY
End: 2023-03-22

## 2023-03-22 RX ORDER — DOCUSATE SODIUM 100 MG/1
100 CAPSULE, LIQUID FILLED ORAL 2 TIMES DAILY
Qty: 56 CAPSULE | Refills: 4 | Status: SHIPPED | OUTPATIENT
Start: 2023-03-22

## 2023-03-22 RX ORDER — CALCIUM CARBONATE 500(1250)
1 TABLET ORAL 3 TIMES DAILY
Qty: 240 TABLET | Refills: 3 | Status: SHIPPED | OUTPATIENT
Start: 2023-03-22

## 2023-03-22 NOTE — PATIENT INSTRUCTIONS
Medicare Preventive Visit Patient Instructions  Thank you for completing your Welcome to Medicare Visit or Medicare Annual Wellness Visit today  Your next wellness visit will be due in one year (3/22/2024)  The screening/preventive services that you may require over the next 5-10 years are detailed below  Some tests may not apply to you based off risk factors and/or age  Screening tests ordered at today's visit but not completed yet may show as past due  Also, please note that scanned in results may not display below  Preventive Screenings:  Service Recommendations Previous Testing/Comments   Colorectal Cancer Screening  · Colonoscopy    · Fecal Occult Blood Test (FOBT)/Fecal Immunochemical Test (FIT)  · Fecal DNA/Cologuard Test  · Flexible Sigmoidoscopy Age: 39-70 years old   Colonoscopy: every 10 years (May be performed more frequently if at higher risk)  OR  FOBT/FIT: every 1 year  OR  Cologuard: every 3 years  OR  Sigmoidoscopy: every 5 years  Screening may be recommended earlier than age 39 if at higher risk for colorectal cancer  Also, an individualized decision between you and your healthcare provider will decide whether screening between the ages of 74-80 would be appropriate   Colonoscopy: Not on file  FOBT/FIT: Not on file  Cologuard: Not on file  Sigmoidoscopy: Not on file          Prostate Cancer Screening Individualized decision between patient and health care provider in men between ages of 53-78   Medicare will cover every 12 months beginning on the day after your 50th birthday PSA: 0 7 ng/mL     Screening Current     Hepatitis C Screening Once for adults born between 1945 and 1965  More frequently in patients at high risk for Hepatitis C Hep C Antibody: 07/07/2022    Screening Current   Diabetes Screening 1-2 times per year if you're at risk for diabetes or have pre-diabetes Fasting glucose: 80 mg/dL (1/20/2023)  A1C: 5 6 (3/22/2023)  Screening Not Indicated  History Diabetes   Cholesterol Screening Once every 5 years if you don't have a lipid disorder  May order more often based on risk factors  Lipid panel: 11/09/2022  Screening Not Indicated  History Lipid Disorder      Other Preventive Screenings Covered by Medicare:  1  Abdominal Aortic Aneurysm (AAA) Screening: covered once if your at risk  You're considered to be at risk if you have a family history of AAA or a male between the age of 73-68 who smoking at least 100 cigarettes in your lifetime  2  Lung Cancer Screening: covers low dose CT scan once per year if you meet all of the following conditions: (1) Age 50-69; (2) No signs or symptoms of lung cancer; (3) Current smoker or have quit smoking within the last 15 years; (4) You have a tobacco smoking history of at least 20 pack years (packs per day x number of years you smoked); (5) You get a written order from a healthcare provider  3  Glaucoma Screening: covered annually if you're considered high risk: (1) You have diabetes OR (2) Family history of glaucoma OR (3)  aged 48 and older OR (3)  American aged 72 and older  3  Osteoporosis Screening: covered every 2 years if you meet one of the following conditions: (1) Have a vertebral abnormality; (2) On glucocorticoid therapy for more than 3 months; (3) Have primary hyperparathyroidism; (4) On osteoporosis medications and need to assess response to drug therapy  5  HIV Screening: covered annually if you're between the age of 12-76  Also covered annually if you are younger than 13 and older than 72 with risk factors for HIV infection  For pregnant patients, it is covered up to 3 times per pregnancy      Immunizations:  Immunization Recommendations   Influenza Vaccine Annual influenza vaccination during flu season is recommended for all persons aged >= 6 months who do not have contraindications   Pneumococcal Vaccine   * Pneumococcal conjugate vaccine = PCV13 (Prevnar 13), PCV15 (Vaxneuvance), PCV20 (Prevnar 20)  * Pneumococcal polysaccharide vaccine = PPSV23 (Pneumovax) Adults 2364 years old: 1-3 doses may be recommended based on certain risk factors  Adults 72 years old: 1-2 doses may be recommended based off what pneumonia vaccine you previously received   Hepatitis B Vaccine 3 dose series if at intermediate or high risk (ex: diabetes, end stage renal disease, liver disease)   Tetanus (Td) Vaccine - COST NOT COVERED BY MEDICARE PART B Following completion of primary series, a booster dose should be given every 10 years to maintain immunity against tetanus  Td may also be given as tetanus wound prophylaxis  Tdap Vaccine - COST NOT COVERED BY MEDICARE PART B Recommended at least once for all adults  For pregnant patients, recommended with each pregnancy  Shingles Vaccine (Shingrix) - COST NOT COVERED BY MEDICARE PART B  2 shot series recommended in those aged 48 and above     Health Maintenance Due:      Topic Date Due   • Colorectal Cancer Screening  Never done   • HIV Screening  Completed   • Hepatitis C Screening  Completed     Immunizations Due:      Topic Date Due   • COVID-19 Vaccine (2 - Booster for Ludy series) 10/05/2021   • Influenza Vaccine (1) 09/01/2022     Advance Directives   What are advance directives? Advance directives are legal documents that state your wishes and plans for medical care  These plans are made ahead of time in case you lose your ability to make decisions for yourself  Advance directives can apply to any medical decision, such as the treatments you want, and if you want to donate organs  What are the types of advance directives? There are many types of advance directives, and each state has rules about how to use them  You may choose a combination of any of the following:  · Living will: This is a written record of the treatment you want  You can also choose which treatments you do not want, which to limit, and which to stop at a certain time   This includes surgery, medicine, IV fluid, and tube feedings  · Durable power of  for healthcare [facility]): This is a written record that states who you want to make healthcare choices for you when you are unable to make them for yourself  This person, called a proxy, is usually a family member or a friend  You may choose more than 1 proxy  · Do not resuscitate (DNR) order:  A DNR order is used in case your heart stops beating or you stop breathing  It is a request not to have certain forms of treatment, such as CPR  A DNR order may be included in other types of advance directives  · Medical directive: This covers the care that you want if you are in a coma, near death, or unable to make decisions for yourself  You can list the treatments you want for each condition  Treatment may include pain medicine, surgery, blood transfusions, dialysis, IV or tube feedings, and a ventilator (breathing machine)  · Values history: This document has questions about your views, beliefs, and how you feel and think about life  This information can help others choose the care that you would choose  Why are advance directives important? An advance directive helps you control your care  Although spoken wishes may be used, it is better to have your wishes written down  Spoken wishes can be misunderstood, or not followed  Treatments may be given even if you do not want them  An advance directive may make it easier for your family to make difficult choices about your care  © Copyright [employer] 2018 Information is for End User's use only and may not be sold, redistributed or otherwise used for commercial purposes   All illustrations and images included in CareNotes® are the copyrighted property of A D A M [employer]  or [address] [place], [place] [address]

## 2023-03-22 NOTE — PROGRESS NOTES
Assessment and Plan:     Problem List Items Addressed This Visit     Type 2 diabetes mellitus without complication, without long-term current use of insulin (HCC) - Primary (Chronic)    Relevant Medications    metFORMIN (GLUCOPHAGE) 1000 MG tablet    Other Relevant Orders    POCT hemoglobin A1c (Completed)    Cerebral palsy (HCC)    Hyperlipidemia    Relevant Medications    calcium carbonate (OYSTER SHELL,OSCAL) 500 mg    Vitamin D deficiency    Relevant Medications    cholecalciferol (VITAMIN D3) 1,000 units tablet    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HCC)    Ambulatory dysfunction   Other Visit Diagnoses     Screening for colon cancer        Relevant Orders    Ambulatory referral for colonoscopy    Screening for tuberculosis        Relevant Orders    TB Skin Test (Completed)    Diabetes mellitus due to underlying condition with hyperosmolarity without coma, without long-term current use of insulin (HCC)        Relevant Medications    calcium carbonate (OYSTER SHELL,OSCAL) 500 mg    metFORMIN (GLUCOPHAGE) 1000 MG tablet    Constipation, unspecified constipation type        Relevant Medications    docusate sodium (COLACE) 100 mg capsule          Depression Screening and Follow-up Plan: Patient was screened for depression during today's encounter  They screened negative with a PHQ-2 score of 0  Preventive health issues were discussed with patient, and age appropriate screening tests were ordered as noted in patient's After Visit Summary  Personalized health advice and appropriate referrals for health education or preventive services given if needed, as noted in patient's After Visit Summary  History of Present Illness:     Patient presents for a Medicare Wellness Visit    Hypertension  This is a chronic problem  The problem is controlled  There are no associated agents to hypertension  Risk factors for coronary artery disease include diabetes mellitus and male gender   Past treatments include ACE inhibitors  The current treatment provides moderate improvement  Identifiable causes of hypertension include a thyroid problem  There is no history of chronic renal disease  Diabetes  He presents for his follow-up diabetic visit  He has type 2 diabetes mellitus  His disease course has been stable  There are no hypoglycemic associated symptoms  There are no diabetic associated symptoms  There are no hypoglycemic complications  Symptoms are stable  There are no diabetic complications  Risk factors for coronary artery disease include diabetes mellitus, hypertension and male sex  Current diabetic treatment includes oral agent (monotherapy) and diet  He is compliant with treatment all of the time  His weight is fluctuating minimally  He is following a generally healthy diet  Meal planning includes avoidance of concentrated sweets  He never participates in exercise  An ACE inhibitor/angiotensin II receptor blocker is being taken  Thyroid Problem  Presents for follow-up visit  The symptoms have been stable  His past medical history is significant for diabetes and hyperlipidemia  Hyperlipidemia  This is a chronic problem  Exacerbating diseases include diabetes and hypothyroidism  He has no history of chronic renal disease, liver disease, obesity or nephrotic syndrome  There are no known factors aggravating his hyperlipidemia  Current antihyperlipidemic treatment includes diet change  There are no compliance problems  Risk factors for coronary artery disease include diabetes mellitus, dyslipidemia and male sex  A1c noted   Lab Results   Component Value Date    HGBA1C 5 6 03/22/2023        Recommend lifestyle and dietary changes which include plant based low glycemic diet  Counseled at length on the importance of diet and exercise regarding the control of their diabetes  Will monitor in 4  months         Patient Care Team:  Lizbet Parker as PCP - General (Internal Medicine)  Olga Noguera MD as Surgeon (General Surgery)  Rizwana Regalado MD (Urology)     Review of Systems:     Review of Systems   All other systems reviewed and are negative  Problem List:     Patient Active Problem List   Diagnosis   • Cataract   • Cerebral palsy ([facility] [address] )   • Type 2 diabetes mellitus without complication, without long-term current use of insulin ([facility] [address] )   • Generalized convulsive epilepsy ([facility] [address] )   • Hyperlipidemia   • Essential hypertension   • Acquired hypothyroidism   • Osteoporosis   • Scoliosis   • Vitamin B12 deficiency   • Vitamin D deficiency   • Seborrheic dermatitis of scalp   • Nearsightedness   • Behavior concern in adult   • 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[address] )   • Metabolic encephalopathy   • Hyponatremia   • Thrombocytopathia (HCC)   • Hypomagnesemia   • Social problem   • Ambulatory dysfunction      Past Medical and Surgical History:     Past Medical History:   Diagnosis Date   • ADRIANA (acute kidney injury) ([facility] [address] ) 9/24/2019   • Bacteremia due to Gram-positive bacteria 9/7/2021   • Buttock wound, left, subsequent encounter 11/5/2021   • COVID-19    • CP (cerebral palsy) ([facility] [address] )    • Depression    • Diabetes ([address] )    • Disease of thyroid gland    • Gait disorder    • Gluteal abscess 9/6/2021   • Hyperlipidemia    • Hypertension    • Kidney failure    • Kidney stones    • Moderate protein-calorie malnutrition ([address] ) 12/22/2021   • Osteoporosis    • Pressure injury of left buttock, stage 4 ([address] ) 3/9/2022   • Psychiatric disorder    • Scoliosis    • Seizures ([facility] [address] )    • Sepsis ([facility] [address] ) 9/25/2019   • Thyroid disease    • UTI (urinary tract infection)      Past Surgical History:   Procedure Laterality Date   • APPENDECTOMY     • IR PICC PLACEMENT SINGLE LUMEN  9/13/2021   • IR PICC PLACEMENT SINGLE LUMEN  9/16/2021      Family History:     History reviewed  No pertinent family history     Social History:     Social History     Socioeconomic History   • Marital status: Single     Spouse name: None   • Number of children: None   • Years of education: None   • Highest education level: None   Occupational History   • None   Tobacco Use   • Smoking status: Never   • Smokeless tobacco: Never   Vaping Use   • Vaping Use: Never used   Substance and Sexual Activity   • Alcohol use: Never   • Drug use: No   • Sexual activity: Not Currently   Other Topics Concern   • None   Social History Narrative   • None     Social Determinants of Health     Financial Resource Strain: Not on file   Food Insecurity: Not on file   Transportation Needs: Not on file   Physical Activity: Not on file   Stress: Not on file   Social Connections: Not on file   Intimate Partner Violence: Not on file   Housing Stability: Not on file      Medications and Allergies:     Current Outpatient Medications   Medication Sig Dispense Refill   • benztropine (COGENTIN) 0 5 mg tablet TAKE 1 TABLET BY MOUTH TWICE A DAY (8AM,8PM) 56 tablet 4   • Blood Glucose Monitoring Suppl Juan Luis Skinner) w/Device KIT by Does not apply route 3 (three) times a day TEST BLOOD SUGARS THREE TIMES 1 kit 0   • calcium carbonate (OYSTER SHELL,OSCAL) 500 mg Take 1 tablet by mouth 3 (three) times a day 240 tablet 3   • cholecalciferol (VITAMIN D3) 1,000 units tablet Take 1 tablet (1,000 Units total) by mouth daily 28 tablet 0   • clonazePAM (KlonoPIN) 0 25 MG disintegrating tablet Take 1 tablet (0 25 mg total) by mouth as needed for seizures (clusters of myoclonic jerking (more than 2 myoclonic jerks in a day)) for up to 1 dose 10 tablet 2   • Depakote  MG 24 hr tablet Take 1 tablet (250 mg total) by mouth daily Take in the morning with one 500 mg tablet (total morning dose of 750 mg)   BRAND ONLY 90 tablet 1   • Depakote  MG 24 hr tablet TAKE 1 TABLET BY MOUTH IN THE MORNING *BRAND NECESSARY*;TAKE 2 TABLETS BY MOUTH AT BEDTIME 90 tablet 11   • docusate sodium (COLACE) 100 mg capsule Take 1 capsule (100 mg total) by mouth 2 (two) times a day 56 capsule 4   • gabapentin (NEURONTIN) 100 mg capsule Take 100 mg by mouth 3 (three) times a day      • Incontinence Supply Disposable (Briefs Overnight Medium) MISC Use in the morning 20 each 11   • Insulin Pen Needle (PEN NEEDLES) 31G X 5 MM MISC by Does not apply route daily 100 each 5   • lacosamide (VIMPAT) 200 mg tablet Give 1 tablet by mouth every 12 hours  180 tablet 0   • lacosamide (Vimpat) 50 mg Take 1 tablet (50 mg total) by mouth every 12 (twelve) hours (in addition to one 200 mg tablet twice per day - total dose of 250 mg twice per day) 180 tablet 0   • levETIRAcetam (KEPPRA) 750 mg tablet Take 2 tablets (1500mg) by mouth twice per day  112 tablet 11   • levothyroxine 150 mcg tablet GIVE 1 TABLET BY MOUTH IN THE MORNING DX HYPOTHYROIDISM 28 tablet 3   • lisinopril (ZESTRIL) 10 mg tablet GIVE 1 TABLET BY MOUTH DAILY FOR HYPERTENSION DR Linda Hood 28 tablet 0   • loratadine (CLARITIN) 10 mg tablet Take 10 mg by mouth daily     • metFORMIN (GLUCOPHAGE) 1000 MG tablet Take 1 tablet (1,000 mg total) by mouth 2 (two) times a day with meals 240 tablet 0   • OneTouch Delica Lancets 25A MISC by Does not apply route daily TEST BLOOD SUGARS THREE TIMES DAILY 100 each 2   • polyethylene glycol (MIRALAX) 17 g packet Take 17 g by mouth daily 14 each 8   • QUEtiapine (SEROquel XR) 200 mg 24 hr tablet TAKE 1 TABLET BY MOUTH TWICE A DAY (8AM,2PM) 16 tablet 10   • QUEtiapine (SEROquel XR) 400 mg 24 hr tablet TAKE 1 TABLET BY MOUTH AT BEDTIME (8PM) (DX: ANTIPSYCHOTIC) 90 tablet 1   • simvastatin (ZOCOR) 40 mg tablet GIVE 1 TABLET BY MOUTH DAILY FOR HIGH CHOLESTEROL DR DENIA AVERY 28 tablet 3   • temazepam (RESTORIL) 15 mg capsule Take 1 capsule (15 mg total) by mouth daily at bedtime as needed for sleep 30 capsule 1   • vitamin B-12 (VITAMIN B-12) 1,000 mcg tablet Take 1 tablet (1,000 mcg total) by mouth daily 30 tablet 5   • Depakote 500 MG EC tablet GIVE 1 TABLET BY MOUTH EVERY MORNING **BRAND NAME ONLY** ~ GIVE 2 TABLETS BY MOUTH AT BEDTIME (=1000MG) FOR SEIZURE DISORDER DR Michelle Bueno (Patient not taking: Reported on 3/22/2023) 90 tablet 5   • gabapentin (NEURONTIN) 300 mg capsule  (Patient not taking: Reported on 1/9/2023)     • GaviLyte-G 236 g solution TAKE AS PER PREP INSTRUCTIONS (Patient not taking: Reported on 3/22/2023)     • Humidifiers (Cool Mist Humidifier 1 gallon) MISC Use daily at bedtime (Patient not taking: Reported on 1/9/2023) 1 each 0   • hydrOXYzine pamoate (VISTARIL) 25 mg capsule  (Patient not taking: Reported on 3/22/2023)     • ibuprofen (MOTRIN) 600 mg tablet Take 600 mg by mouth every 6 (six) hours as needed for mild pain (Patient not taking: Reported on 3/22/2023)     • levothyroxine 125 mcg tablet  (Patient not taking: Reported on 3/22/2023)     • Multiple Vitamin (Daily-Reina) TABS Take 1 tablet by mouth daily Take at 8 AM (Patient not taking: Reported on 3/22/2023) 90 tablet 1   • sodium chloride (OCEAN) 0 65 % nasal spray 1 spray into each nostril as needed for congestion (Patient not taking: Reported on 1/9/2023) 15 mL 1     No current facility-administered medications for this visit       Allergies   Allergen Reactions   • Erythromycin Other (See Comments)     Unknown per pt   • Penicillins Other (See Comments)     Unknown per pt      Immunizations:     Immunization History   Administered Date(s) Administered   • COVID-19 J&J (Ludy) vaccine 0 5 mL 08/10/2021   • INFLUENZA 12/09/2015   • Influenza Quadrivalent Preservative Free 3 years and older IM 12/09/2015, 12/12/2017   • Influenza, recombinant, quadrivalent,injectable, preservative free 12/13/2018, 11/19/2019, 10/15/2020, 11/03/2021   • Influenza, seasonal, injectable 12/19/2016   • Pneumococcal Conjugate Vaccine 20-valent (Pcv20), Polysace 11/16/2022   • Pneumococcal Polysaccharide PPV23 12/12/2017   • Tdap 06/15/2017   • Tuberculin Skin Test-PPD Intradermal 12/19/2016, 03/08/2017, 03/14/2019, 03/22/2023      Health Maintenance:         Topic Date Due   • Colorectal Cancer Screening Never done   • HIV Screening  Completed   • Hepatitis C Screening  Completed         Topic Date Due   • COVID-19 Vaccine (2 - Booster for Ludy series) 10/05/2021   • Influenza Vaccine (1) 09/01/2022      Medicare Screening Tests and Risk Assessments:     Elmer Armstrong is here for his Subsequent Wellness visit  Last Medicare Wellness visit information reviewed, patient interviewed, no change since last AWV  Historian  Patient cannot answer questions due to cognitive impairment, intelluctual disability, or expressive limitations  Information provided by: caregiver  Health Risk Assessment:   Patient rates overall health as fair  Patient feels that their physical health rating is same  Patient is dissatisfied with their life  Eyesight was rated as same  Hearing was rated as same  Patient feels that their emotional and mental health rating is same  Patients states they are never, rarely angry  Patient states they are never, rarely unusually tired/fatigued  Pain experienced in the last 7 days has been none  Patient states that he has experienced weight loss or gain in last 6 months  Depression Screening:   PHQ-2 Score: 0      Fall Risk Screening: In the past year, patient has experienced: history of falling in past year    Number of falls: 2 or more  Injured during fall?: Yes    Feels unsteady when standing or walking?: Yes    Worried about falling?: Yes      Home Safety:  Patient has trouble with stairs inside or outside of their home  Patient has working smoke alarms and has working carbon monoxide detector  Home safety hazards include: none  Nutrition:   Current diet is Regular  Medications:   Patient is currently taking over-the-counter supplements  OTC medications include: see medication list  Patient is not able to manage medications       Activities of Daily Living (ADLs)/Instrumental Activities of Daily Living (IADLs):   Walk and transfer into and out of bed and chair?: Yes  Dress and groom yourself?: Yes    Bathe or shower yourself?: Yes    Feed yourself?  Yes  Do your laundry/housekeeping?: No  Manage your money, pay your bills and track your expenses?: No  Make your own meals?: No    Do your own shopping?: No    Previous Hospitalizations:   Any hospitalizations or ED visits within the last 12 months?: Yes    How many hospitalizations have you had in the last year?: 1-2    Advance Care Planning:   Living will: No    Durable POA for healthcare: No    Advanced directive counseling given: Yes    Five wishes given: Yes    Patient declined ACP directive: No    End of Life Decisions reviewed with patient: Yes    Provider agrees with end of life decisions: No      Cognitive Screening:   Provider or family/friend/caregiver concerned regarding cognition?: No    PREVENTIVE SCREENINGS      Cardiovascular Screening:    General: Screening Not Indicated and History Lipid Disorder      Diabetes Screening:     General: Screening Not Indicated and History Diabetes      Colorectal Cancer Screening:     General: Risks and Benefits Discussed    Due for: Colonoscopy - Low Risk      Prostate Cancer Screening:    General: Screening Current      Osteoporosis Screening:    General: Screening Not Indicated and History Osteoporosis      Abdominal Aortic Aneurysm (AAA) Screening:        General: Screening Not Indicated      Lung Cancer Screening:     General: Screening Not Indicated      Hepatitis C Screening:    General: Screening Current    Screening, Brief Intervention, and Referral to Treatment (SBIRT)    Screening      Single Item Drug Screening:  How often have you used an illegal drug (including marijuana) or a prescription medication for non-medical reasons in the past year? never    Single Item Drug Screen Score: 0  Interpretation: Negative screen for possible drug use disorder    Other Counseling Topics:   Car/seat belt/driving safety, skin self-exam, sunscreen and calcium and vitamin D intake and regular weightbearing exercise  No results found  Physical Exam:     /86 (BP Location: Left arm, Patient Position: Sitting, Cuff Size: Adult)   Pulse 68   Temp 97 5 °F (36 4 °C) (Tympanic)   Ht 6' (1 829 m)   Wt 70 3 kg (155 lb)   SpO2 97%   BMI 21 02 kg/m²     Physical Exam  Vitals and nursing note reviewed  Constitutional:       Appearance: Normal appearance  He is well-developed  Interventions: Face mask in place  HENT:      Head: Normocephalic and atraumatic  Right Ear: External ear normal       Left Ear: External ear normal       Nose: Nose normal    Eyes:      Conjunctiva/sclera: Conjunctivae normal       Pupils: Pupils are equal, round, and reactive to light  Cardiovascular:      Rate and Rhythm: Normal rate and regular rhythm  Heart sounds: Normal heart sounds  Pulmonary:      Effort: Pulmonary effort is normal       Breath sounds: Normal breath sounds  Abdominal:      General: Bowel sounds are normal       Palpations: Abdomen is soft  Genitourinary:     Comments: Deferred  Musculoskeletal:         General: Normal range of motion  Cervical back: Normal range of motion and neck supple  Skin:     General: Skin is warm and dry  Capillary Refill: Capillary refill takes less than 2 seconds  Neurological:      Mental Status: He is alert and oriented to person, place, and time  Psychiatric:         Behavior: Behavior normal          Thought Content:  Thought content normal          Judgment: Judgment normal           HUNTER Aden

## 2023-03-24 LAB
INDURATION: 0 MM
TB SKIN TEST: NEGATIVE

## 2023-03-27 DIAGNOSIS — G23.2 PARKINSON'S PLUS SYNDROME (HCC): ICD-10-CM

## 2023-03-27 RX ORDER — BENZTROPINE MESYLATE 0.5 MG/1
TABLET ORAL
Qty: 56 TABLET | Refills: 4 | Status: SHIPPED | OUTPATIENT
Start: 2023-03-27

## 2023-04-03 DIAGNOSIS — E08.00 DIABETES MELLITUS DUE TO UNDERLYING CONDITION WITH HYPEROSMOLARITY WITHOUT COMA, WITHOUT LONG-TERM CURRENT USE OF INSULIN (HCC): ICD-10-CM

## 2023-04-03 DIAGNOSIS — G62.9 NEUROPATHY: ICD-10-CM

## 2023-04-03 DIAGNOSIS — E78.5 HYPERLIPIDEMIA, UNSPECIFIED HYPERLIPIDEMIA TYPE: ICD-10-CM

## 2023-04-03 DIAGNOSIS — R56.9 SEIZURE (HCC): ICD-10-CM

## 2023-04-03 DIAGNOSIS — I10 HYPERTENSION, UNSPECIFIED TYPE: ICD-10-CM

## 2023-04-03 RX ORDER — MULTIVITAMIN WITH FOLIC ACID 400 MCG
1 TABLET ORAL DAILY
Qty: 90 TABLET | Refills: 11 | Status: SHIPPED | OUTPATIENT
Start: 2023-04-03

## 2023-04-12 NOTE — DISCHARGE INSTR - OTHER ORDERS
Skin care plans:  1-Hydraguard to bilateral sacrum, buttock and heels BID and PRN  2-Elevate heels to offload pressure  3-Ehob cushion in chair when out of bed  4-Moisturize skin daily with skin nourishing cream   5-Turn/reposition q2h or when medically stable for pressure re-distribution on skin    6  Surgery following Left buttocks wound for VAC therapy      Tremfya Counseling: I discussed with the patient the risks of guselkumab including but not limited to immunosuppression, serious infections, worsening of inflammatory bowel disease and drug reactions.  The patient understands that monitoring is required including a PPD at baseline and must alert us or the primary physician if symptoms of infection or other concerning signs are noted.

## 2023-05-03 DIAGNOSIS — G40.309 GENERALIZED CONVULSIVE EPILEPSY (HCC): ICD-10-CM

## 2023-05-03 RX ORDER — LACOSAMIDE 50 MG
TABLET ORAL
Qty: 60 TABLET | Refills: 2 | Status: SHIPPED | OUTPATIENT
Start: 2023-05-03

## 2023-05-03 RX ORDER — LACOSAMIDE 200 MG/1
TABLET ORAL
Qty: 60 TABLET | Refills: 2 | Status: SHIPPED | OUTPATIENT
Start: 2023-05-03

## 2023-05-08 DIAGNOSIS — F41.9 ANXIETY: Primary | ICD-10-CM

## 2023-05-08 RX ORDER — HYDROXYZINE PAMOATE 25 MG/1
CAPSULE ORAL
Qty: 56 CAPSULE | Refills: 0 | Status: SHIPPED | OUTPATIENT
Start: 2023-05-08

## 2023-05-16 ENCOUNTER — APPOINTMENT (EMERGENCY)
Dept: RADIOLOGY | Facility: HOSPITAL | Age: 58
End: 2023-05-16

## 2023-05-16 ENCOUNTER — HOSPITAL ENCOUNTER (EMERGENCY)
Facility: HOSPITAL | Age: 58
Discharge: HOME | End: 2023-05-16
Attending: STUDENT IN AN ORGANIZED HEALTH CARE EDUCATION/TRAINING PROGRAM

## 2023-05-16 VITALS
SYSTOLIC BLOOD PRESSURE: 145 MMHG | OXYGEN SATURATION: 96 % | TEMPERATURE: 97.1 F | HEART RATE: 79 BPM | RESPIRATION RATE: 16 BRPM | DIASTOLIC BLOOD PRESSURE: 85 MMHG
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
## 2023-05-16 LAB
ANION GAP SERPL CALCULATED.3IONS-SCNC: 8 MMOL/L (ref 4–13)
ATRIAL RATE: 74 BPM
BASOPHILS # BLD AUTO: 0.02 THOUSANDS/ÂΜL (ref 0–0.1)
BASOPHILS NFR BLD AUTO: 0 % (ref 0–1)
BUN SERPL-MCNC: 14 MG/DL (ref 5–25)
CALCIUM SERPL-MCNC: 9.8 MG/DL (ref 8.4–10.2)
CARDIAC TROPONIN I PNL SERPL HS: 11 NG/L
CHLORIDE SERPL-SCNC: 98 MMOL/L (ref 96–108)
CO2 SERPL-SCNC: 31 MMOL/L (ref 21–32)
CREAT SERPL-MCNC: 0.74 MG/DL (ref 0.6–1.3)
EOSINOPHIL # BLD AUTO: 0.01 THOUSAND/ÂΜL (ref 0–0.61)
EOSINOPHIL NFR BLD AUTO: 0 % (ref 0–6)
ERYTHROCYTE [DISTWIDTH] IN BLOOD BY AUTOMATED COUNT: 13.2 % (ref 11.6–15.1)
GFR SERPL CREATININE-BSD FRML MDRD: 103 ML/MIN/1.73SQ M
GLUCOSE SERPL-MCNC: 132 MG/DL (ref 65–140)
GLUCOSE SERPL-MCNC: 145 MG/DL (ref 65–140)
HCT VFR BLD AUTO: 41.1 % (ref 36.5–49.3)
HGB BLD-MCNC: 13.8 G/DL (ref 12–17)
IMM GRANULOCYTES # BLD AUTO: 0.01 THOUSAND/UL (ref 0–0.2)
IMM GRANULOCYTES NFR BLD AUTO: 0 % (ref 0–2)
LYMPHOCYTES # BLD AUTO: 0.9 THOUSANDS/ÂΜL (ref 0.6–4.47)
LYMPHOCYTES NFR BLD AUTO: 12 % (ref 14–44)
MCH RBC QN AUTO: 31.9 PG (ref 26.8–34.3)
MCHC RBC AUTO-ENTMCNC: 33.6 G/DL (ref 31.4–37.4)
MCV RBC AUTO: 95 FL (ref 82–98)
MONOCYTES # BLD AUTO: 0.9 THOUSAND/ÂΜL (ref 0.17–1.22)
MONOCYTES NFR BLD AUTO: 12 % (ref 4–12)
NEUTROPHILS # BLD AUTO: 5.94 THOUSANDS/ÂΜL (ref 1.85–7.62)
NEUTS SEG NFR BLD AUTO: 76 % (ref 43–75)
NRBC BLD AUTO-RTO: 0 /100 WBCS
P AXIS: 71 DEGREES
PLATELET # BLD AUTO: 157 THOUSANDS/UL (ref 149–390)
PMV BLD AUTO: 11.7 FL (ref 8.9–12.7)
POTASSIUM SERPL-SCNC: 4.1 MMOL/L (ref 3.5–5.3)
PR INTERVAL: 186 MS
QRS AXIS: 150 DEGREES
QRSD INTERVAL: 170 MS
QT INTERVAL: 442 MS
QTC INTERVAL: 490 MS
RBC # BLD AUTO: 4.32 MILLION/UL (ref 3.88–5.62)
SODIUM SERPL-SCNC: 137 MMOL/L (ref 135–147)
T WAVE AXIS: 30 DEGREES
VALPROATE SERPL-MCNC: 40 UG/ML (ref 50–100)
VENTRICULAR RATE: 74 BPM
WBC # BLD AUTO: 7.78 THOUSAND/UL (ref 4.31–10.16)

## 2023-05-16 RX ORDER — SODIUM CHLORIDE 9 MG/ML
3 INJECTION INTRAVENOUS
Status: DISCONTINUED | OUTPATIENT
Start: 2023-05-16 | End: 2023-05-16 | Stop reason: HOSPADM

## 2023-05-16 NOTE — ED PROVIDER NOTES
"History  Chief Complaint   Patient presents with   • Fall     Pt fell several times while bowling no head strike, pt is currently experiencing n/v  Care taker would just like him to get checked out  HPI  This a 46-year-old male presents the emergency department with his caregiver for concerns of multiple \"falls \"over the past 24 hours  History obtained primarily from caregiver who states patient lost his footing twice yesterday while bowling and fell to the ground did not hit his head neck or back is not on any blood thinners  Caregiver states patient was in his normal state of health afterwards and continued about his day  However patient states he woke up this morning and did not feel good and want to come to the hospital   Patient is unable to offer up any distinct complaint and just complains of feeling \"off \"  Per the caregiver patient's had similar episodes to this in the past which she believes to be secondary to attention seeking  Prior to Admission Medications   Prescriptions Last Dose Informant Patient Reported? Taking? Blood Glucose Monitoring Suppl (ONETOUCH VERIO) w/Device KIT   No No   Sig: by Does not apply route 3 (three) times a day TEST BLOOD SUGARS THREE TIMES   Depakote 500 MG DR tablet   No No   Sig: GIVE 1 TABLET BY MOUTH EVERY MORNING **BRAND NAME ONLY** ~ GIVE 2 TABLETS BY MOUTH AT BEDTIME (=1000MG) FOR SEIZURE DISORDER DR LIVE JONES   Depakote  MG 24 hr tablet   No No   Sig: Take 1 tablet (250 mg total) by mouth daily Take in the morning with one 500 mg tablet (total morning dose of 750 mg)   BRAND ONLY   Depakote  MG 24 hr tablet   No No   Sig: TAKE 1 TABLET BY MOUTH IN THE MORNING *BRAND NECESSARY*;TAKE 2 TABLETS BY MOUTH AT BEDTIME   GaviLyte-G 236 g solution   Yes No   Sig: TAKE AS PER PREP INSTRUCTIONS   Patient not taking: Reported on 3/22/2023   Humidifiers (Cool Mist Humidifier 1 gallon) MISC   No No   Sig: Use daily at bedtime   Patient not taking: " Reported on 1/9/2023   Incontinence Supply Disposable (Briefs Overnight Medium) MISC   No No   Sig: Use in the morning   Insulin Pen Needle (PEN NEEDLES) 31G X 5 MM MISC   No No   Sig: by Does not apply route daily   Multiple Vitamin (Daily-Reina) TABS   No No   Sig: Take 1 tablet by mouth daily Take at 8 AM   OneTouch Delica Lancets 66G MISC   No No   Sig: by Does not apply route daily TEST BLOOD SUGARS THREE TIMES DAILY   QUEtiapine (SEROquel XR) 200 mg 24 hr tablet   No No   Sig: TAKE 1 TABLET BY MOUTH TWICE A DAY (8AM,2PM)   QUEtiapine (SEROquel XR) 400 mg 24 hr tablet   No No   Sig: TAKE 1 TABLET BY MOUTH AT BEDTIME (8PM) (DX: ANTIPSYCHOTIC)   Vimpat 50 MG   No No   Sig: GIVE 1 TABLET BY MOUTH EVERY 12 HOUR(S) (IN ADDITION TO ONE 200MG TWICE PER DAY - TOTAL DOSE OF 250MG TWICE PER DAY)   benztropine (COGENTIN) 0 5 mg tablet   No No   Sig: GIVE 1 TABLET BY MOUTH TWICE DAILY DX TREAT ADVERSE EFFECTS (DR RADU AVERY)   calcium carbonate (OYSTER SHELL,OSCAL) 500 mg   No No   Sig: Take 1 tablet by mouth 3 (three) times a day   cholecalciferol (VITAMIN D3) 1,000 units tablet   No No   Sig: Take 1 tablet (1,000 Units total) by mouth daily   clonazePAM (KlonoPIN) 0 25 MG disintegrating tablet   No No   Sig: Take 1 tablet (0 25 mg total) by mouth as needed for seizures (clusters of myoclonic jerking (more than 2 myoclonic jerks in a day)) for up to 1 dose   docusate sodium (COLACE) 100 mg capsule   No No   Sig: Take 1 capsule (100 mg total) by mouth 2 (two) times a day   gabapentin (NEURONTIN) 100 mg capsule   Yes No   Sig: Take 100 mg by mouth 3 (three) times a day    gabapentin (NEURONTIN) 300 mg capsule   Yes No   Patient not taking: Reported on 1/9/2023   hydrOXYzine pamoate (VISTARIL) 25 mg capsule   No No   Sig: GIVE 1 CAPSULE BY MOUTH TWICE DAILY IN IN THE MORNING AND 2PM DX ANXIETY   ibuprofen (MOTRIN) 600 mg tablet   Yes No   Sig: Take 600 mg by mouth every 6 (six) hours as needed for mild pain   Patient not taking: Reported on 3/22/2023   lacosamide (VIMPAT) 200 mg tablet   No No   Sig: GIVE 1 TABLET BY MOUTH EVERY 12 HOUR(S) FOR SEIZURE DISORDER   levETIRAcetam (KEPPRA) 750 mg tablet   No No   Sig: Take 2 tablets (1500mg) by mouth twice per day  levothyroxine 125 mcg tablet   Yes No   Patient not taking: Reported on 3/22/2023   levothyroxine 150 mcg tablet   No No   Sig: GIVE 1 TABLET BY MOUTH IN THE MORNING DX HYPOTHYROIDISM   lisinopril (ZESTRIL) 10 mg tablet   No No   Sig: GIVE 1 TABLET BY MOUTH DAILY FOR HYPERTENSION DR RADU AVERY   loratadine (CLARITIN) 10 mg tablet   Yes No   Sig: Take 10 mg by mouth daily   metFORMIN (GLUCOPHAGE) 1000 MG tablet   No No   Sig: Take 1 tablet (1,000 mg total) by mouth 2 (two) times a day with meals   polyethylene glycol (MIRALAX) 17 g packet   No No   Sig: Take 17 g by mouth daily   simvastatin (ZOCOR) 40 mg tablet   No No   Sig: GIVE 1 TABLET BY MOUTH DAILY FOR HIGH CHOLESTEROL DR DENIA AVERY   sodium chloride (OCEAN) 0 65 % nasal spray   No No   Si spray into each nostril as needed for congestion   Patient not taking: Reported on 2023   temazepam (RESTORIL) 15 mg capsule   No No   Sig: Take 1 capsule (15 mg total) by mouth daily at bedtime as needed for sleep   vitamin B-12 (VITAMIN B-12) 1,000 mcg tablet   No No   Sig: Take 1 tablet (1,000 mcg total) by mouth daily      Facility-Administered Medications: None       Past Medical History:   Diagnosis Date   • [facility] 2019   • Bacteremia due to Gram-positive bacteria 2021   • Buttock wound, left, subsequent encounter 2021   • COVID-19    • CP (cerebral palsy) ([facility] )    • Depression    • Diabetes ([facility] )    • Disease of thyroid gland    • Gait disorder    • Gluteal abscess 2021   • Hyperlipidemia    • Hypertension    • Kidney failure    • Kidney stones    • Moderate protein-calorie malnutrition ([facility] ) 2021   • Osteoporosis    • Pressure injury of left buttock, stage 4 ([facility] ) 3/9/2022   • Psychiatric disorder    • Scoliosis    • Seizures ([facility] )    • Sepsis ([facility] ) 9/25/2019   • Thyroid disease    • UTI (urinary tract infection)        Past Surgical History:   Procedure Laterality Date   • APPENDECTOMY     • IR PICC PLACEMENT SINGLE LUMEN  9/13/2021   • IR PICC PLACEMENT SINGLE LUMEN  9/16/2021       History reviewed  No pertinent family history  I have reviewed and agree with the history as documented  E-Cigarette/Vaping   • E-Cigarette Use Never User      E-Cigarette/Vaping Substances   • Nicotine No    • THC No    • CBD No    • Flavoring No    • Other No    • Unknown No      Social History     Tobacco Use   • Smoking status: Never   • Smokeless tobacco: Never   Vaping Use   • Vaping Use: Never used   Substance Use Topics   • Alcohol use: Never   • Drug use: No       Review of Systems   Constitutional: Negative for activity change, appetite change, chills, fatigue and fever  HENT: Negative for congestion, dental problem, drooling, ear discharge, ear pain, facial swelling, postnasal drip, rhinorrhea and sinus pain  Eyes: Negative for photophobia, pain, discharge and itching  Respiratory: Negative for apnea, cough, chest tightness and shortness of breath  Cardiovascular: Negative for chest pain and leg swelling  Gastrointestinal: Negative for abdominal distention, abdominal pain, anal bleeding, constipation, diarrhea and nausea  Endocrine: Negative for cold intolerance, heat intolerance and polydipsia  Genitourinary: Negative for difficulty urinating  Musculoskeletal: Negative for arthralgias, gait problem, joint swelling and myalgias  Skin: Negative for color change and pallor  Allergic/Immunologic: Negative for immunocompromised state  Neurological: Positive for weakness  Negative for dizziness, seizures, facial asymmetry, light-headedness, numbness and headaches          Syncope     Psychiatric/Behavioral: Negative for agitation, behavioral problems, confusion, decreased concentration and dysphoric mood  All other systems reviewed and are negative  Physical Exam  Physical Exam  Constitutional:       Appearance: He is well-developed  He is not ill-appearing or diaphoretic  HENT:      Head: Normocephalic  Eyes:      Pupils: Pupils are equal, round, and reactive to light  Cardiovascular:      Rate and Rhythm: Normal rate and regular rhythm  Pulmonary:      Effort: Pulmonary effort is normal       Breath sounds: Normal breath sounds  Abdominal:      General: Bowel sounds are normal       Palpations: Abdomen is soft  Musculoskeletal:         General: Normal range of motion  Cervical back: Normal range of motion and neck supple  Skin:     General: Skin is warm  Neurological:      Mental Status: He is alert  Mental status is at baseline        Comments: B/l hand tremor - baseline              Vital Signs  ED Triage Vitals   Temperature Pulse Respirations Blood Pressure SpO2   05/16/23 1051 05/16/23 1050 05/16/23 1050 05/16/23 1050 05/16/23 1050   (!) 97 1 °F (36 2 °C) 79 16 145/85 96 %      Temp Source Heart Rate Source Patient Position - Orthostatic VS BP Location FiO2 (%)   05/16/23 1051 05/16/23 1050 05/16/23 1050 05/16/23 1050 --   Temporal Monitor Sitting Left arm       Pain Score       --                  Vitals:    05/16/23 1050   BP: 145/85   Pulse: 79   Patient Position - Orthostatic VS: Sitting         Visual Acuity      ED Medications  Medications   sodium chloride (PF) 0 9 % injection 3 mL (has no administration in time range)       Diagnostic Studies  Results Reviewed     Procedure Component Value Units Date/Time    HS Troponin I 4hr [[id number]]     Lab Status: No result Specimen: Blood     HS Troponin I 2hr [[id number]]     Lab Status: No result Specimen: Blood     HS Troponin 0hr (reflex protocol) [[id number]]  (Normal) Collected: 05/16/23 1138    Lab Status: Final result Specimen: Blood from Arm, Right Updated: 05/16/23 1215     hs TnI 0hr 11 ng/L     Valproic acid level, total [[id number]]  (Abnormal) Collected: 05/16/23 1138    Lab Status: Final result Specimen: Blood from Arm, Right Updated: 05/16/23 1207     Valproic Acid, Total 40 ug/mL     Basic metabolic panel [[id number]] Collected: 05/16/23 1138    Lab Status: Final result Specimen: Blood from Arm, Right Updated: 05/16/23 1207     Sodium 137 mmol/L      Potassium 4 1 mmol/L      Chloride 98 mmol/L      CO2 31 mmol/L      ANION GAP 8 mmol/L      BUN 14 mg/dL      Creatinine 0 74 mg/dL      Glucose 132 mg/dL      Calcium 9 8 mg/dL      eGFR 103 ml/min/1 73sq m     Narrative:      [place] guidelines for Chronic Kidney Disease (CKD):   •  Stage 1 with normal or high GFR (GFR > 90 mL/min/1 73 square meters)  •  Stage 2 Mild CKD (GFR = 60-89 mL/min/1 73 square meters)  •  Stage 3A Moderate CKD (GFR = 45-59 mL/min/1 73 square meters)  •  Stage 3B Moderate CKD (GFR = 30-44 mL/min/1 73 square meters)  •  Stage 4 Severe CKD (GFR = 15-29 mL/min/1 73 square meters)  •  Stage 5 End Stage CKD (GFR <15 mL/min/1 73 square meters)  Note: GFR calculation is accurate only with a steady state creatinine    CBC and differential [[id number]]  (Abnormal) Collected: 05/16/23 1138    Lab Status: Final result Specimen: Blood from Arm, Right Updated: 05/16/23 1152     WBC 7 78 Thousand/uL      RBC 4 32 Million/uL      Hemoglobin 13 8 g/dL      Hematocrit 41 1 %      MCV 95 fL      MCH 31 9 pg      MCHC 33 6 g/dL      RDW 13 2 %      MPV 11 7 fL      Platelets 130 Thousands/uL      nRBC 0 /100 WBCs      Neutrophils Relative 76 %      Immat GRANS % 0 %      Lymphocytes Relative 12 %      Monocytes Relative 12 %      Eosinophils Relative 0 %      Basophils Relative 0 %      Neutrophils Absolute 5 94 Thousands/µL      Immature Grans Absolute 0 01 Thousand/uL      Lymphocytes Absolute 0 90 Thousands/µL      Monocytes Absolute 0 90 Thousand/µL      Eosinophils Absolute 0 01 Thousand/µL      Basophils Absolute 0 02 Thousands/µL     Fingerstick Glucose (POCT) [[id number]]  (Abnormal) Collected: 05/16/23 1138    Lab Status: Final result Updated: 05/16/23 1145     POC Glucose 145 mg/dl                  X-ray chest 1 view portable   Final Result by Maria Antonia Yousif MD (05/16 1130)      No acute cardiopulmonary disease  Workstation performed: [id number]                    Procedures  ECG 12 Lead Documentation Only    Date/Time: 5/16/2023 11:48 AM  Performed by: Dellis Severe, MD  Authorized by: Dellis Severe, MD     Indications / Diagnosis:  Syncopal  ECG reviewed by me, the ED Provider: yes    Patient location:  ED  Previous ECG:     Previous ECG:  Unavailable    Comparison to cardiac monitor: Yes    Interpretation:     Interpretation: normal    Rate:     ECG rate:  74    ECG rate assessment: normal    Rhythm:     Rhythm: sinus rhythm    Ectopy:     Ectopy: none    QRS:     QRS axis:  Normal  Conduction:     Conduction: normal               ED Course  ED Course as of 05/16/23 1221   Tue May 16, 2023   1134 Reviewed by myself no acute abnormalities noted  1149 POC Glucose(!): 145   1200 WBC: 7 78   1207 VALPROIC ACID TOTAL(!): 40             HEART Risk Score    Flowsheet Row Most Recent Value   Heart Score Risk Calculator    History 0 Filed at: 05/16/2023 1216   ECG 0 Filed at: 05/16/2023 1216   Age 1 Filed at: 05/16/2023 1216   Risk Factors 0 Filed at: 05/16/2023 1216   Troponin 0 Filed at: 05/16/2023 1216   HEART Score 1 Filed at: 05/16/2023 1216                        SBIRT 20yo+    Flowsheet Row Most Recent Value   Initial Alcohol Screen: US AUDIT-C     2  How many drinks containing alcohol do you have on a typical day you are drinking? 0 Filed at: 05/16/2023 1055   3a  Male UNDER 65: How often do you have five or more drinks on one occasion? 0 Filed at: 05/16/2023 1055   Audit-C Score 0 Filed at: 05/16/2023 1055   ADRIAN: How many times in the past year have you        Used an illegal drug or used a prescription medication for non-medical reasons? Never Filed at: 05/16/2023 1055                    Medical Decision Making  Is a 61-year-old male with past medical history of seizure cerebral palsy who presents to the emergency department with fall versus presyncopal episodes  Patient is hemodynamically stable and physical exam is consistent with baseline tremor and ambulatory dysfunction  Suspect that recurrent falls likely secondary to baseline ambulatory dysfunction however cannot rule out underlying abnormality such as ADRIANA, electrolyte abnormality, ACS given age  Proceed with ACS work-up anticipate discharge home  Fall, initial encounter: acute illness or injury  Amount and/or Complexity of Data Reviewed  Independent Historian: parent  External Data Reviewed: labs  Labs: ordered  Decision-making details documented in ED Course  Radiology: ordered  Decision-making details documented in ED Course  ECG/medicine tests:  Decision-making details documented in ED Course  Risk  Prescription drug management  Disposition  Final diagnoses:   Fall, initial encounter     Time reflects when diagnosis was documented in both MDM as applicable and the Disposition within this note     Time User Action Codes Description Comment    5/16/2023 12:08 PM Florence Chan Add [P02  Carolyn Stoddard, initial encounter       ED Disposition     ED Disposition   Discharge    Condition   Stable    Date/Time   Tue May 16, 2023 12:21 PM    Comment   Valdemar Curtis [facility] discharge to home/self care  Follow-up Information     Follow up With Specialties Details [address], [facility], Nurse Practitioner   [address] [facility] [id number]  [phone]            Patient's Medications   Discharge Prescriptions    No medications on file       No discharge procedures on file      PDMP Review       Value Time User    PDMP Reviewed  Yes 5/3/2023  4:24 PM Cecile Jackson [facility]          ED Provider  Electronically Signed by           Fracisco Funes MD  05/16/23 9073       Fracisco Funes MD  05/16/23 8016

## 2023-05-19 ENCOUNTER — OFFICE VISIT (OUTPATIENT)
Dept: NEUROLOGY | Facility: CLINIC | Age: 58
End: 2023-05-19

## 2023-05-19 VITALS
WEIGHT: 155 LBS | HEART RATE: 102 BPM | DIASTOLIC BLOOD PRESSURE: 86 MMHG | SYSTOLIC BLOOD PRESSURE: 178 MMHG | BODY MASS INDEX: 20.99 KG/M2 | HEIGHT: 72 IN

## 2023-05-19 DIAGNOSIS — G40.309 GENERALIZED CONVULSIVE EPILEPSY (HCC): Primary | Chronic | ICD-10-CM

## 2023-05-19 DIAGNOSIS — D69.1 THROMBOCYTOPATHIA (HCC): ICD-10-CM

## 2023-05-19 RX ORDER — ZONISAMIDE 100 MG/1
CAPSULE ORAL
Qty: 90 CAPSULE | Refills: 11 | Status: SHIPPED | OUTPATIENT
Start: 2023-05-19

## 2023-05-19 RX ORDER — CLONAZEPAM 0.25 MG/1
0.25 TABLET, ORALLY DISINTEGRATING ORAL AS NEEDED
Qty: 10 TABLET | Refills: 2 | Status: SHIPPED | OUTPATIENT
Start: 2023-05-19

## 2023-05-19 NOTE — PROGRESS NOTES
Patient ID: George Souza is a 66 y o  male with cerebral palsy, generalized epilepsy, and behavioral disorder, who is returning to Neurology office for follow up of his seizures  Assessment/Plan:    Generalized convulsive epilepsy ([facility] )  Although he has not had any additional generalized tonic-clonic seizures, he is still having occasional myoclonic seizures, especially over the course of this last week  This is led to multiple falls including one on the walk into the office today resulting in a 1 cm right knee abrasion  Overall, he has been doing well in the past when on divalproex and zonisamide  It may be best to try to get him back onto zonisamide and potentially come off of lacosamide to try to improve his symptoms  --I will have him continue Depakote and levetiracetam unchanged for now  He will start taking zonisamide 100 mg nightly and increase to the goal dose of 300 mg nightly  He will also decrease his lacosamide to be 200 mg twice a day  -- It would be important to recheck blood work including CBC, CMP, ammonia level, and medication levels next week and again before his next visit    --We did review how much Depakote he is to be taking currently  His caregiver overall double check how much he is taking and if he is taking less than intended, will give our office a call  Will be important for us to recheck his valproate level since this was low when he was in the hospital   If still low, this could potentially explain his worsening myoclonus and we may want to increase his dose of Depakote  Thrombocytopathia ([facility] [address] )  He does have chronically low platelets  I will be important to continue to follow his platelet count since there was concern that combination of Depakote and zonisamide caused thrombocytopenia in the past         He will Return in about 6 weeks (around 6/30/2023)  Subjective:    HPI  Current seizure medications:  1  Lacosamide 250 mg twice a day  2  Levetiracetam  1500 mg twice a day  3  Depakote ER (Brand only): 750 mg in the am and 1000 mg at night  4  Clonazepam 0 25 mg prn seizure  Other medications as per Epic  Since his last visit, he increased the Lacosamide and had been doing pretty good  He wasn't really having much shaking or other problems up until this past week  He has been having a lot more falls this week  They deny any imbalance or difficulty walking, but mainly that he is having occasional myoclonus which will throw him to the ground he was bowling this week and he had fallen several times when bowling  They did go to the ED on 5/16/23  He ahd blood work there which showed that his Depakote lower was lower than previous at a level of 40  He also had a fall when walking into the office today due to myoclonus, which led to a left knee abrasion    They have not noted any additional larger seizures  Prior Seizure Medications: zonisamide (stopped due to thrombocytopenia)    His history was also obtained from his caregiver, who was present at today's visit  Objective:    Blood pressure (!) 178/86, pulse 102, height 6' (1 829 m), weight 70 3 kg (155 lb)  Physical Exam    Neurological Exam      ROS:    Review of Systems    I personally reviewed the ROS that was entered by the medical assistant in a separate note  Voice recognition software was used in the generation of this note  There may be unintentional errors including grammatical errors, spelling errors, or pronoun errors

## 2023-05-19 NOTE — PROGRESS NOTES
Review of Systems   Constitutional: Negative  Negative for appetite change and fever  HENT: Negative  Negative for hearing loss, tinnitus, trouble swallowing and voice change  Eyes: Negative  Negative for photophobia, pain and visual disturbance  Respiratory: Negative  Negative for shortness of breath  Cardiovascular: Negative  Negative for palpitations  Gastrointestinal: Negative  Negative for nausea and vomiting  Endocrine: Negative  Negative for cold intolerance  Genitourinary: Negative  Negative for dysuria, frequency and urgency  Musculoskeletal: Positive for gait problem  Negative for myalgias and neck pain  Skin: Negative  Negative for rash  Allergic/Immunologic: Negative  Neurological: Positive for tremors (full body jerks)  Negative for dizziness, seizures, syncope, facial asymmetry, speech difficulty, weakness, light-headedness, numbness and headaches  Hematological: Negative  Does not bruise/bleed easily  Psychiatric/Behavioral: Negative  Negative for confusion, hallucinations and sleep disturbance  All other systems reviewed and are negative

## 2023-05-19 NOTE — PATIENT INSTRUCTIONS
-- Please start taking Zonisamide 1 capsule (100 mg) nightly and decrease Lacosamide (Vimpat) to be 200 mg twice a day  - Then in 1 week, increase Zonisamide to be 2 capsules (200 mg) nightly    - then in 1 week, increase Zonisamide to be 3 capsules (300 mg) nightly  -- Continue his other medications unchanged for now  -- Please get some blood work checked early next week and again about 1 week before his next office visit  -- Please double check his Depakote (Divalproex) dose  He should be taking one 250 mg tab and one 500 mg tab in the morning for a total morning dose of 750 mg  He also takes two 500 mg tabs at night for a nighttime dose of 1000 mg       - if he is taking it differently than this, please give our office a call

## 2023-05-21 NOTE — ASSESSMENT & PLAN NOTE
Although he has not had any additional generalized tonic-clonic seizures, he is still having occasional myoclonic seizures, especially over the course of this last week  This is led to multiple falls including one on the walk into the office today resulting in a 1 cm right knee abrasion  Overall, he has been doing well in the past when on divalproex and zonisamide  It may be best to try to get him back onto zonisamide and potentially come off of lacosamide to try to improve his symptoms  --I will have him continue Depakote and levetiracetam unchanged for now  He will start taking zonisamide 100 mg nightly and increase to the goal dose of 300 mg nightly  He will also decrease his lacosamide to be 200 mg twice a day  -- It would be important to recheck blood work including CBC, CMP, ammonia level, and medication levels next week and again before his next visit    --We did review how much Depakote he is to be taking currently  His caregiver overall double check how much he is taking and if he is taking less than intended, will give our office a call  Will be important for us to recheck his valproate level since this was low when he was in the hospital   If still low, this could potentially explain his worsening myoclonus and we may want to increase his dose of Depakote

## 2023-05-21 NOTE — ASSESSMENT & PLAN NOTE
He does have chronically low platelets    I will be important to continue to follow his platelet count since there was concern that combination of Depakote and zonisamide caused thrombocytopenia in the past

## 2023-05-22 ENCOUNTER — APPOINTMENT (EMERGENCY)
Dept: RADIOLOGY | Facility: HOSPITAL | Age: 58
End: 2023-05-22

## 2023-05-22 ENCOUNTER — HOSPITAL ENCOUNTER (EMERGENCY)
Facility: HOSPITAL | Age: 58
Discharge: HOME | End: 2023-05-22
Attending: FAMILY MEDICINE | Admitting: FAMILY MEDICINE

## 2023-05-22 VITALS
TEMPERATURE: 98 F | RESPIRATION RATE: 16 BRPM | BODY MASS INDEX: 20.34 KG/M2 | SYSTOLIC BLOOD PRESSURE: 171 MMHG | HEART RATE: 92 BPM | WEIGHT: 150 LBS | DIASTOLIC BLOOD PRESSURE: 90 MMHG | OXYGEN SATURATION: 98 %
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
## 2023-05-22 DIAGNOSIS — L03.115 CELLULITIS OF RIGHT FOOT: ICD-10-CM

## 2023-05-22 LAB
ALBUMIN SERPL BCP-MCNC: 4.4 G/DL (ref 3.5–5)
ALP SERPL-CCNC: 49 U/L (ref 34–104)
ALT SERPL W P-5'-P-CCNC: 18 U/L (ref 7–52)
AMMONIA PLAS-SCNC: 42 UMOL/L (ref 18–72)
ANION GAP SERPL CALCULATED.3IONS-SCNC: 8 MMOL/L (ref 4–13)
AST SERPL W P-5'-P-CCNC: 39 U/L (ref 13–39)
BASOPHILS # BLD AUTO: 0.06 THOUSANDS/ÂΜL (ref 0–0.1)
BASOPHILS NFR BLD AUTO: 1 % (ref 0–1)
BILIRUB SERPL-MCNC: 0.38 MG/DL (ref 0.2–1)
BUN SERPL-MCNC: 18 MG/DL (ref 5–25)
CALCIUM SERPL-MCNC: 9.5 MG/DL (ref 8.4–10.2)
CHLORIDE SERPL-SCNC: 100 MMOL/L (ref 96–108)
CO2 SERPL-SCNC: 28 MMOL/L (ref 21–32)
CREAT SERPL-MCNC: 0.68 MG/DL (ref 0.6–1.3)
EOSINOPHIL # BLD AUTO: 0.16 THOUSAND/ÂΜL (ref 0–0.61)
EOSINOPHIL NFR BLD AUTO: 2 % (ref 0–6)
ERYTHROCYTE [DISTWIDTH] IN BLOOD BY AUTOMATED COUNT: 13.4 % (ref 11.6–15.1)
GFR SERPL CREATININE-BSD FRML MDRD: 106 ML/MIN/1.73SQ M
GLUCOSE SERPL-MCNC: 97 MG/DL (ref 65–140)
HCT VFR BLD AUTO: 42.3 % (ref 36.5–49.3)
HGB BLD-MCNC: 14 G/DL (ref 12–17)
IMM GRANULOCYTES # BLD AUTO: 0.03 THOUSAND/UL (ref 0–0.2)
IMM GRANULOCYTES NFR BLD AUTO: 1 % (ref 0–2)
LYMPHOCYTES # BLD AUTO: 1.95 THOUSANDS/ÂΜL (ref 0.6–4.47)
LYMPHOCYTES NFR BLD AUTO: 29 % (ref 14–44)
MCH RBC QN AUTO: 32.2 PG (ref 26.8–34.3)
MCHC RBC AUTO-ENTMCNC: 33.1 G/DL (ref 31.4–37.4)
MCV RBC AUTO: 97 FL (ref 82–98)
MONOCYTES # BLD AUTO: 1.13 THOUSAND/ÂΜL (ref 0.17–1.22)
MONOCYTES NFR BLD AUTO: 17 % (ref 4–12)
NEUTROPHILS # BLD AUTO: 3.32 THOUSANDS/ÂΜL (ref 1.85–7.62)
NEUTS SEG NFR BLD AUTO: 50 % (ref 43–75)
NRBC BLD AUTO-RTO: 0 /100 WBCS
PLATELET # BLD AUTO: 185 THOUSANDS/UL (ref 149–390)
PMV BLD AUTO: 11.1 FL (ref 8.9–12.7)
POTASSIUM SERPL-SCNC: 5.7 MMOL/L (ref 3.5–5.3)
PROT SERPL-MCNC: 7.4 G/DL (ref 6.4–8.4)
RBC # BLD AUTO: 4.35 MILLION/UL (ref 3.88–5.62)
SODIUM SERPL-SCNC: 136 MMOL/L (ref 135–147)
VALPROATE SERPL-MCNC: 100 UG/ML (ref 50–100)
WBC # BLD AUTO: 6.65 THOUSAND/UL (ref 4.31–10.16)

## 2023-05-22 RX ORDER — ZONISAMIDE 100 MG/1
100 CAPSULE ORAL DAILY
Status: DISCONTINUED | OUTPATIENT
Start: 2023-05-22 | End: 2023-05-22 | Stop reason: HOSPADM

## 2023-05-22 RX ORDER — CEPHALEXIN 500 MG/1
500 CAPSULE ORAL ONCE
Status: COMPLETED | OUTPATIENT
Start: 2023-05-22 | End: 2023-05-22

## 2023-05-22 RX ORDER — CEPHALEXIN 500 MG/1
500 CAPSULE ORAL EVERY 12 HOURS SCHEDULED
Qty: 14 CAPSULE | Refills: 0 | Status: SHIPPED | OUTPATIENT
Start: 2023-05-22 | End: 2023-05-29

## 2023-05-22 RX ADMIN — CEPHALEXIN 500 MG: 500 CAPSULE ORAL at 12:18

## 2023-05-22 RX ADMIN — ZONISAMIDE 100 MG: 100 CAPSULE ORAL at 09:44

## 2023-05-22 NOTE — ED TRIAGE NOTES
[address] w/caregiver w/complaint of frequent falls; evaluated for same 16 May 2023; evaluated by neurology Friday & mediation changed d/t falls, caregiver has not picked up new medications yet

## 2023-05-22 NOTE — ED PROVIDER NOTES
History  Chief Complaint   Patient presents with   • Fall     [address] w/caregiver w/complaint of frequent falls; evaluated for same 16 May 2023; evaluated by neurology Friday & mediation changed d/t falls, caregiver has not picked up new medications yet     HPI  Is a 80-year-old male with a history of seizure disorder under the care of neurology has a myoclonus jerking was seen in the emergency department for similar complaint  The caretaker brought him in because he has been having recurrent fall  Patient was seen in the ED for the same complaint had extensive work-up and was advised to follow-up with the neurology  Patient was seen by neurology team last week they increased his Depakote and added zonisamide  Caretaker states that she has not picked up his prescription since it was given Friday  She states that she was coming to get blood work done per neurology recommendation when she thought that she can bring him to the ED  She also states that she noticed some redness on his right foot and wanted to get him checked  Denies any other fall  Prior to Admission Medications   Prescriptions Last Dose Informant Patient Reported? Taking? Blood Glucose Monitoring Suppl (ONETOUCH VERIO) w/Device KIT   No No   Sig: by Does not apply route 3 (three) times a day TEST BLOOD SUGARS THREE TIMES   Depakote 500 MG DR tablet   No No   Sig: GIVE 1 TABLET BY MOUTH EVERY MORNING **BRAND NAME ONLY** ~ GIVE 2 TABLETS BY MOUTH AT BEDTIME (=1000MG) FOR SEIZURE DISORDER DR LIVE JONES   Depakote  MG 24 hr tablet   No No   Sig: Take 1 tablet (250 mg total) by mouth daily Take in the morning with one 500 mg tablet (total morning dose of 750 mg)   BRAND ONLY   Depakote  MG 24 hr tablet   No No   Sig: TAKE 1 TABLET BY MOUTH IN THE MORNING *BRAND NECESSARY*;TAKE 2 TABLETS BY MOUTH AT BEDTIME   GaviLyte-G 236 g solution   Yes No   Sig: TAKE AS PER PREP INSTRUCTIONS   Patient not taking: Reported on 3/22/2023   Humidifiers (Cool Mist Humidifier 1 gallon) MISC   No No   Sig: Use daily at bedtime   Patient not taking: Reported on 1/9/2023   Incontinence Supply Disposable (Briefs Overnight Medium) MISC   No No   Sig: Use in the morning   Insulin Pen Needle (PEN NEEDLES) 31G X 5 MM MISC   No No   Sig: by Does not apply route daily   Multiple Vitamin (Daily-Reina) TABS   No No   Sig: Take 1 tablet by mouth daily Take at 8 AM   OneTouch Delica Lancets 87X MISC   No No   Sig: by Does not apply route daily TEST BLOOD SUGARS THREE TIMES DAILY   QUEtiapine (SEROquel XR) 200 mg 24 hr tablet   No No   Sig: TAKE 1 TABLET BY MOUTH TWICE A DAY (8AM,2PM)   QUEtiapine (SEROquel XR) 400 mg 24 hr tablet   No No   Sig: TAKE 1 TABLET BY MOUTH AT BEDTIME (8PM) (DX: ANTIPSYCHOTIC)   benztropine (COGENTIN) 0 5 mg tablet   No No   Sig: GIVE 1 TABLET BY MOUTH TWICE DAILY DX TREAT ADVERSE EFFECTS (DR RADU AVERY)   calcium carbonate (OYSTER SHELL,OSCAL) 500 mg   No No   Sig: Take 1 tablet by mouth 3 (three) times a day   cholecalciferol (VITAMIN D3) 1,000 units tablet   No No   Sig: Take 1 tablet (1,000 Units total) by mouth daily   clonazePAM (KlonoPIN) 0 25 MG disintegrating tablet   No No   Sig: Take 1 tablet (0 25 mg total) by mouth as needed for seizures (clusters of myoclonic jerking (more than 2 myoclonic jerks in a day)) for up to 1 dose   docusate sodium (COLACE) 100 mg capsule   No No   Sig: Take 1 capsule (100 mg total) by mouth 2 (two) times a day   gabapentin (NEURONTIN) 100 mg capsule   Yes No   Sig: Take 100 mg by mouth 3 (three) times a day    gabapentin (NEURONTIN) 300 mg capsule   Yes No   Patient not taking: Reported on 1/9/2023   hydrOXYzine pamoate (VISTARIL) 25 mg capsule   No No   Sig: GIVE 1 CAPSULE BY MOUTH TWICE DAILY IN IN THE MORNING AND 2PM DX ANXIETY   ibuprofen (MOTRIN) 600 mg tablet   Yes No   Sig: Take 600 mg by mouth every 6 (six) hours as needed for mild pain   Patient not taking: Reported on 3/22/2023   lacosamide (VIMPAT) 200 mg tablet   No No   Sig: GIVE 1 TABLET BY MOUTH EVERY 12 HOUR(S) FOR SEIZURE DISORDER   levETIRAcetam (KEPPRA) 750 mg tablet   No No   Sig: Take 2 tablets (1500mg) by mouth twice per day  levothyroxine 125 mcg tablet   Yes No   Patient not taking: Reported on 3/22/2023   levothyroxine 150 mcg tablet   No No   Sig: GIVE 1 TABLET BY MOUTH IN THE MORNING DX HYPOTHYROIDISM   lisinopril (ZESTRIL) 10 mg tablet   No No   Sig: GIVE 1 TABLET BY MOUTH DAILY FOR HYPERTENSION DR ARDU AVERY   loratadine (CLARITIN) 10 mg tablet   Yes No   Sig: Take 10 mg by mouth daily   metFORMIN (GLUCOPHAGE) 1000 MG tablet   No No   Sig: Take 1 tablet (1,000 mg total) by mouth 2 (two) times a day with meals   polyethylene glycol (MIRALAX) 17 g packet   No No   Sig: Take 17 g by mouth daily   simvastatin (ZOCOR) 40 mg tablet   No No   Sig: GIVE 1 TABLET BY MOUTH DAILY FOR HIGH CHOLESTEROL DR DENIA AVERY   sodium chloride (OCEAN) 0 65 % nasal spray   No No   Si spray into each nostril as needed for congestion   Patient not taking: Reported on 2023   temazepam (RESTORIL) 15 mg capsule   No No   Sig: Take 1 capsule (15 mg total) by mouth daily at bedtime as needed for sleep   vitamin B-12 (VITAMIN B-12) 1,000 mcg tablet   No No   Sig: Take 1 tablet (1,000 mcg total) by mouth daily   zonisamide (ZONEGRAN) 100 mg capsule   No No   Sig: Take 100 mg nightly for 1 wk, then take 200 mg nightly for 1 wk, then take 300 mg nightly        Facility-Administered Medications: None       Past Medical History:   Diagnosis Date   • [facility] 2019   • Bacteremia due to Gram-positive bacteria 2021   • Buttock wound, left, subsequent encounter 2021   • COVID-19    • CP (cerebral palsy) ([facility] )    • Depression    • Diabetes ([facility] )    • Disease of thyroid gland    • Gait disorder    • Gluteal abscess 2021   • Hyperlipidemia    • Hypertension    • Kidney failure    • Kidney stones    • Moderate protein-calorie malnutrition ([facility] ) 12/22/2021   • Osteoporosis    • Pressure injury of left buttock, stage 4 ([facility] ) 3/9/2022   • Psychiatric disorder    • Scoliosis    • Seizures ([facility] )    • Sepsis ([facility] ) 9/25/2019   • Thyroid disease    • UTI (urinary tract infection)        Past Surgical History:   Procedure Laterality Date   • APPENDECTOMY     • IR PICC PLACEMENT SINGLE LUMEN  9/13/2021   • IR PICC PLACEMENT SINGLE LUMEN  9/16/2021       History reviewed  No pertinent family history  I have reviewed and agree with the history as documented  E-Cigarette/Vaping   • E-Cigarette Use Never User      E-Cigarette/Vaping Substances   • Nicotine No    • THC No    • CBD No    • Flavoring No    • Other No    • Unknown No      Social History     Tobacco Use   • Smoking status: Never   • Smokeless tobacco: Never   Vaping Use   • Vaping Use: Never used   Substance Use Topics   • Alcohol use: Never   • Drug use: No       Review of Systems   Constitutional: Negative  HENT: Negative  Eyes: Negative  Respiratory: Negative  Cardiovascular: Negative  Gastrointestinal: Negative  Endocrine: Negative  Genitourinary: Negative  Musculoskeletal: Negative  Skin: Positive for wound  Allergic/Immunologic: Negative  Neurological: Negative  Hematological: Negative  Psychiatric/Behavioral: Negative  Physical Exam  Physical Exam  Vitals and nursing note reviewed  Constitutional:       General: He is not in acute distress  Appearance: He is well-developed  He is not diaphoretic  HENT:      Head: Normocephalic and atraumatic  Right Ear: External ear normal       Left Ear: External ear normal       Nose: Nose normal    Eyes:      Conjunctiva/sclera: Conjunctivae normal       Pupils: Pupils are equal, round, and reactive to light  Cardiovascular:      Rate and Rhythm: Normal rate and regular rhythm  Pulmonary:      Effort: Pulmonary effort is normal  No respiratory distress        Breath sounds: Normal breath sounds  No wheezing  Abdominal:      General: There is no distension  Tenderness: There is no abdominal tenderness  Musculoskeletal:         General: Normal range of motion  Cervical back: Normal range of motion and neck supple  Lymphadenopathy:      Cervical: No cervical adenopathy  Skin:     General: Skin is warm and dry  Capillary Refill: Capillary refill takes less than 2 seconds  Findings: Erythema (right big toe: there is redness on the right toe mild swelling noted no deformity noted ) present  Neurological:      General: No focal deficit present  Mental Status: He is alert  Mental status is at baseline     Psychiatric:         Behavior: Behavior normal          Vital Signs  ED Triage Vitals [05/22/23 0825]   Temperature Pulse Respirations Blood Pressure SpO2   98 °F (36 7 °C) 92 16 (!) 171/90 98 %      Temp Source Heart Rate Source Patient Position - Orthostatic VS BP Location FiO2 (%)   Temporal Monitor Sitting Right arm --      Pain Score       --           Vitals:    05/22/23 0825 05/22/23 1051 05/22/23 1054   BP: (!) 171/90     Pulse: 92     Patient Position - Orthostatic VS: Sitting Lying Lying         Visual Acuity  Visual Acuity    Flowsheet Row Most Recent Value   L Pupil Size (mm) 3   R Pupil Size (mm) 3          ED Medications  Medications   zonisamide (ZONEGRAN) capsule 100 mg (100 mg Oral Given 5/22/23 0944)   cephalexin (KEFLEX) capsule 500 mg (500 mg Oral Given 5/22/23 1218)       Diagnostic Studies  Results Reviewed     Procedure Component Value Units Date/Time    CBC and differential [[id number]]  (Abnormal) Collected: 05/22/23 1030    Lab Status: Final result Specimen: Blood from Arm, Left Updated: 05/22/23 1039     WBC 6 65 Thousand/uL      RBC 4 35 Million/uL      Hemoglobin 14 0 g/dL      Hematocrit 42 3 %      MCV 97 fL      MCH 32 2 pg      MCHC 33 1 g/dL      RDW 13 4 %      MPV 11 1 fL      Platelets 243 Thousands/uL      nRBC 0 /100 WBCs Neutrophils Relative 50 %      Immat GRANS % 1 %      Lymphocytes Relative 29 %      Monocytes Relative 17 %      Eosinophils Relative 2 %      Basophils Relative 1 %      Neutrophils Absolute 3 32 Thousands/µL      Immature Grans Absolute 0 03 Thousand/uL      Lymphocytes Absolute 1 95 Thousands/µL      Monocytes Absolute 1 13 Thousand/µL      Eosinophils Absolute 0 16 Thousand/µL      Basophils Absolute 0 06 Thousands/µL     Lacosamide [[id number]] Collected: 05/22/23 1030    Lab Status:  In process Specimen: Blood from Arm, Left Updated: 05/22/23 1034    Comprehensive metabolic panel [[id number]]  (Abnormal) Collected: 05/22/23 0921    Lab Status: Final result Specimen: Blood from Arm, Left Updated: 05/22/23 0951     Sodium 136 mmol/L      Potassium 5 7 mmol/L      Chloride 100 mmol/L      CO2 28 mmol/L      ANION GAP 8 mmol/L      BUN 18 mg/dL      Creatinine 0 68 mg/dL      Glucose 97 mg/dL      Calcium 9 5 mg/dL      AST 39 U/L      ALT 18 U/L      Alkaline Phosphatase 49 U/L      Total Protein 7 4 g/dL      Albumin 4 4 g/dL      Total Bilirubin 0 38 mg/dL      eGFR 106 ml/min/1 73sq m     Narrative:      [place] guidelines for Chronic Kidney Disease (CKD):   •  Stage 1 with normal or high GFR (GFR > 90 mL/min/1 73 square meters)  •  Stage 2 Mild CKD (GFR = 60-89 mL/min/1 73 square meters)  •  Stage 3A Moderate CKD (GFR = 45-59 mL/min/1 73 square meters)  •  Stage 3B Moderate CKD (GFR = 30-44 mL/min/1 73 square meters)  •  Stage 4 Severe CKD (GFR = 15-29 mL/min/1 73 square meters)  •  Stage 5 End Stage CKD (GFR <15 mL/min/1 73 square meters)  Note: GFR calculation is accurate only with a steady state creatinine    Valproic acid level, total [[id number]]  (Normal) Collected: 05/22/23 0921    Lab Status: Final result Specimen: Blood from Arm, Left Updated: 05/22/23 0951     Valproic Acid, Total 100 ug/mL     Ammonia [[id number]]  (Normal) Collected: 05/22/23 0921    Lab Status: Final result Specimen: Blood from Arm, Left Updated: 05/22/23 0948     Ammonia 42 umol/L     Levetiracetam level [[id number]] Collected: 05/22/23 0921    Lab Status: In process Specimen: Blood from Arm, Left Updated: 05/22/23 0927                 XR foot 3+ views RIGHT   Final Result by Rosa Thomas MD (05/22 1208)   No acute osseous abnormality  Workstation performed: [id number]                    Procedures  Procedures         ED Course  ED Course as of 05/22/23 1232   Mon May 22, 2023   1159 Patient symptom improved per the caretaker who is by bedside  1222 Chronic distal fibula fracture      No acute fracture or malalignment      No suspicious lytic or blastic bone lesion  Chronic degenerative changes      No acute soft tissue pathology      IMPRESSION:  No acute osseous abnormality  SBIRT 20yo+    Flowsheet Row Most Recent Value   Initial Alcohol Screen: US AUDIT-C     1  How often do you have a drink containing alcohol? 0 Filed at: 05/22/2023 0831   2  How many drinks containing alcohol do you have on a typical day you are drinking? 0 Filed at: 05/22/2023 0831   3a  Male UNDER 65: How often do you have five or more drinks on one occasion? 0 Filed at: 05/22/2023 0831   Audit-C Score 0 Filed at: 05/22/2023 9697   ADRIAN: How many times in the past year have you    Used an illegal drug or used a prescription medication for non-medical reasons? Never Filed at: 05/22/2023 0831                    Medical Decision Making  44-year-old male with a history of conversion disorder was brought in by caretaker for blood work and to check his right foot  She states that she noticed some redness around his right great toe and wanted to get him checked to rule out a fracture  Patient was this seen by neurology on Friday and was started on zonisamide to control his myoclonus and seizures  She states that she has not started medication  We will start medication in the ED    We will also obtain x-ray to rule out any fracture or osteomyelitis of the foot will obtain labs to rule out to any renal failure electrolyte imbalance or infection  The elevation in the potassium which is moderately hemolyzed  Recommending to repeat the labs  Labs and x-ray imaging was reviewed by me I do not see any gas or osteomyelitis  Exam is consistent with a cellulitis we will start him on antibiotics  Caretaker states that he looks a lot better and his myoclonus jerking has resolved after starting the zonisamide  Disposition: Patient is stable for discharge with strict precaution regarding return  Amount and/or Complexity of Data Reviewed  Labs: ordered  Radiology: ordered  Risk  Prescription drug management  Disposition  Final diagnoses:   Fall, initial encounter   Cellulitis of right foot     Time reflects when diagnosis was documented in both MDM as applicable and the Disposition within this note     Time User Action Codes Description Comment    5/22/2023 12:02 PM Charles Pacheco [Z75  UXGN] Fall, initial encounter     5/22/2023 12:02 PM Barb Pratt Add [K26 [id number]] Cellulitis of right foot       ED Disposition     ED Disposition   Discharge    Condition   Stable    Date/Time   Mon May 22, 2023 12:01 PM    Comment   Karie Juliann [facility] discharge to home/self care  Follow-up Information     Follow up With Specialties Details [address]  Internal Medicine, Nurse Practitioner Schedule an appointment as soon as possible for a visit in 2 days If symptoms worsen [address] [place] [address]  [phone]            Patient's Medications   Discharge Prescriptions    CEPHALEXIN (KEFLEX) 500 MG CAPSULE    Take 1 capsule (500 mg total) by mouth every 12 (twelve) hours for 7 days       Start Date: 5/22/2023 End Date: 5/29/2023       Order Dose: 500 mg       Quantity: 14 capsule    Refills: 0       No discharge procedures on file      PDMP Review       Value Time User    PDMP Reviewed  Yes 5/3/2023  4:24 PM [address] [facility],[address] [facility]          ED Provider  Electronically Signed by           Jacob Jay MD  05/22/23 8387

## 2023-05-24 LAB
LACOSAMIDE SERPL-MCNC: 12.9 UG/ML (ref 5–10)
LEVETIRACETAM SERPL-MCNC: <2 UG/ML (ref 10–40)

## 2023-05-25 ENCOUNTER — TELEPHONE (OUTPATIENT)
Dept: NEUROLOGY | Facility: CLINIC | Age: 58
End: 2023-05-25

## 2023-05-25 NOTE — TELEPHONE ENCOUNTER
----- Message from Queen Antonieta MD sent at 5/24/2023  1:39 PM EDT -----  Please call patient's caregiver to review his medications  There was some confusion about his dosing and myoclonus was recently worse  Medication levels have been lower than expected and lower than prior levels  I am worried he is not taking his medications correctly  If possible, could be have him come in for a nurse visit for a med check? They should bring all his pill bottles to the office to confirm exactly what he is taking      ----- Message -----  From: HUNTER Sotelo  Sent: 5/24/2023  10:28 AM EDT  To: Queen Antonieta MD    Discussed with caretaker that patient needs to follow up with neurology

## 2023-05-25 NOTE — TELEPHONE ENCOUNTER
Called re: below and rang several times and hung up with no option for VM  Called back and this time went to VM, however VMB was not set up yet  Will try at a later date

## 2023-05-26 NOTE — TELEPHONE ENCOUNTER
Called re: below and again phone rang and rang and then hung up without the opportunity to leave a VM  Will try at a later date

## 2023-06-14 ENCOUNTER — TELEPHONE (OUTPATIENT)
Dept: ADMINISTRATIVE | Facility: OTHER | Age: 58
End: 2023-06-14

## 2023-06-14 ENCOUNTER — OFFICE VISIT (OUTPATIENT)
Dept: FAMILY MEDICINE CLINIC | Facility: CLINIC | Age: 58
End: 2023-06-14

## 2023-06-14 VITALS
BODY MASS INDEX: 21.54 KG/M2 | HEART RATE: 96 BPM | DIASTOLIC BLOOD PRESSURE: 72 MMHG | SYSTOLIC BLOOD PRESSURE: 132 MMHG | TEMPERATURE: 98.3 F | HEIGHT: 72 IN | OXYGEN SATURATION: 97 % | WEIGHT: 159 LBS

## 2023-06-14 DIAGNOSIS — Z01.00 DIABETIC EYE EXAM (HCC): ICD-10-CM

## 2023-06-14 DIAGNOSIS — E11.9 DIABETIC EYE EXAM (HCC): ICD-10-CM

## 2023-06-14 DIAGNOSIS — L03.031 CELLULITIS OF MIDDLE TOE, RIGHT: Primary | ICD-10-CM

## 2023-06-14 NOTE — LETTER
Procedure Request Form: Colonoscopy      Date Requested: 06/15/23  Patient: Deanne Cave  Patient : [id number]   Referring Provider: Jeneal Bogus, CRNP        Date of Procedure ______________________________       The above patient has informed us that they have completed their   most recent Colonoscopy at your facility  Please complete   this form and attach all corresponding procedure reports/results  Comments __________________________________________________________  ____________________________________________________________________  ____________________________________________________________________  ____________________________________________________________________    Facility Completing Procedure _________________________________________    Form Completed By (print name) _______________________________________      Signature __________________________________________________________      These reports are needed for  compliance  Please fax this completed form and a copy of the procedure report to our office located at [place] [address] as soon as possible to Fax [phone] vince Win: Phone [phone]    We thank you for your assistance in treating our mutual patient

## 2023-06-14 NOTE — PROGRESS NOTES
Assessment/Plan:    Problem List Items Addressed This Visit    None  Visit Diagnoses     Cellulitis of middle toe, right    -  Primary    Diabetic eye exam [facility])        Relevant Orders    Ambulatory Referral to Ophthalmology           Diagnoses and all orders for this visit:    Cellulitis of middle toe, right    Diabetic eye exam [facility])  -     Ambulatory Referral to Ophthalmology; Future        No problem-specific Assessment & Plan notes found for this encounter  Subjective:      Patient ID: Marie Bryson is a 66 y o  male  Patient was seen twice in the ER for falls  First was on May 16 and extensive work-up with no clear ideology, Pt continued about day w/o complatins was recommended to reach out to neurologist who increased the prescribed Depakote and added zonisamide, however the patient's caretaker at second ED visit stated that and that picked up the prescription at that time  Patient was also treated for cellulitic infection of the right middle toe with cephalexin  The following portions of the patient's history were reviewed and updated as appropriate:   He has a past medical history of ADRIANA (acute kidney injury) ([address] ) (9/24/2019), Bacteremia due to Gram-positive bacteria (9/7/2021), Buttock wound, left, subsequent encounter (11/5/2021), COVID-19, CP (cerebral palsy) ([address] ), Depression, Diabetes ([address] ), Disease of thyroid gland, Gait disorder, Gluteal abscess (9/6/2021), Hyperlipidemia, Hypertension, Kidney failure, Kidney stones, Moderate protein-calorie malnutrition ([address] ) (12/22/2021), Osteoporosis, Pressure injury of left buttock, stage 4 ([address] ) (3/9/2022), Psychiatric disorder, Scoliosis, Seizures ([address] ), Sepsis ([address] ) (9/25/2019), Thyroid disease, and UTI (urinary tract infection)  ,  does not have any pertinent problems on file  ,   has a past surgical history that includes Appendectomy; IR PICC placement single lumen (9/13/2021); and IR PICC placement single lumen (9/16/2021)  ,  family history is not on file  ,   reports that he has never smoked  He has never used smokeless tobacco  He reports that he does not drink alcohol and does not use drugs  ,  is allergic to erythromycin and penicillins     Current Outpatient Medications   Medication Sig Dispense Refill   • benztropine (COGENTIN) 0 5 mg tablet GIVE 1 TABLET BY MOUTH TWICE DAILY DX TREAT ADVERSE EFFECTS (DR Robson Doherty) 56 tablet 4   • Blood Glucose Monitoring Suppl (Milton Signs) w/Device KIT by Does not apply route 3 (three) times a day TEST BLOOD SUGARS THREE TIMES 1 kit 0   • calcium carbonate (OYSTER SHELL,OSCAL) 500 mg Take 1 tablet by mouth 3 (three) times a day 240 tablet 3   • cholecalciferol (VITAMIN D3) 1,000 units tablet Take 1 tablet (1,000 Units total) by mouth daily 28 tablet 0   • clonazePAM (KlonoPIN) 0 25 MG disintegrating tablet Take 1 tablet (0 25 mg total) by mouth as needed for seizures (clusters of myoclonic jerking (more than 2 myoclonic jerks in a day)) for up to 1 dose 10 tablet 2   • Depakote 500 MG DR tablet GIVE 1 TABLET BY MOUTH EVERY MORNING **BRAND NAME ONLY** ~ GIVE 2 TABLETS BY MOUTH AT BEDTIME (=1000MG) FOR SEIZURE DISORDER DR LIVE JONES 90 tablet 11   • Depakote  MG 24 hr tablet Take 1 tablet (250 mg total) by mouth daily Take in the morning with one 500 mg tablet (total morning dose of 750 mg)   BRAND ONLY 90 tablet 1   • Depakote  MG 24 hr tablet TAKE 1 TABLET BY MOUTH IN THE MORNING *BRAND NECESSARY*;TAKE 2 TABLETS BY MOUTH AT BEDTIME 90 tablet 11   • docusate sodium (COLACE) 100 mg capsule Take 1 capsule (100 mg total) by mouth 2 (two) times a day 56 capsule 4   • gabapentin (NEURONTIN) 300 mg capsule      • hydrOXYzine pamoate (VISTARIL) 25 mg capsule GIVE 1 CAPSULE BY MOUTH TWICE DAILY IN IN THE MORNING AND 2PM DX ANXIETY 56 capsule 0   • Incontinence Supply Disposable (Briefs Overnight Medium) MISC Use in the morning 20 each 11   • lacosamide (VIMPAT) 200 mg tablet GIVE 1 TABLET BY MOUTH EVERY 12 HOUR(S) FOR SEIZURE DISORDER 60 tablet 2   • levETIRAcetam (KEPPRA) 750 mg tablet Take 2 tablets (1500mg) by mouth twice per day  112 tablet 11   • levothyroxine 150 mcg tablet GIVE 1 TABLET BY MOUTH IN THE MORNING DX HYPOTHYROIDISM 28 tablet 3   • lisinopril (ZESTRIL) 10 mg tablet GIVE 1 TABLET BY MOUTH DAILY FOR HYPERTENSION DR Ashley Chavez 28 tablet 0   • loratadine (CLARITIN) 10 mg tablet Take 10 mg by mouth daily     • metFORMIN (GLUCOPHAGE) 1000 MG tablet Take 1 tablet (1,000 mg total) by mouth 2 (two) times a day with meals 240 tablet 0   • Multiple Vitamin (Daily-Reina) TABS Take 1 tablet by mouth daily Take at 8 AM 90 tablet 11   • OneTouch Delica Lancets 20X MISC by Does not apply route daily TEST BLOOD SUGARS THREE TIMES DAILY 100 each 2   • polyethylene glycol (MIRALAX) 17 g packet Take 17 g by mouth daily 14 each 8   • QUEtiapine (SEROquel XR) 200 mg 24 hr tablet TAKE 1 TABLET BY MOUTH TWICE A DAY (8AM,2PM) 16 tablet 10   • QUEtiapine (SEROquel XR) 400 mg 24 hr tablet TAKE 1 TABLET BY MOUTH AT BEDTIME (8PM) (DX: ANTIPSYCHOTIC) 90 tablet 1   • simvastatin (ZOCOR) 40 mg tablet GIVE 1 TABLET BY MOUTH DAILY FOR HIGH CHOLESTEROL DR DENIA AVERY 28 tablet 3   • temazepam (RESTORIL) 15 mg capsule Take 1 capsule (15 mg total) by mouth daily at bedtime as needed for sleep 30 capsule 1   • vitamin B-12 (VITAMIN B-12) 1,000 mcg tablet Take 1 tablet (1,000 mcg total) by mouth daily 30 tablet 5   • zonisamide (ZONEGRAN) 100 mg capsule Take 100 mg nightly for 1 wk, then take 200 mg nightly for 1 wk, then take 300 mg nightly   90 capsule 11   • gabapentin (NEURONTIN) 100 mg capsule Take 100 mg by mouth 3 (three) times a day      • GaviLyte-G 236 g solution TAKE AS PER PREP INSTRUCTIONS (Patient not taking: Reported on 3/22/2023)     • Humidifiers (Cool Mist Humidifier 1 gallon) MISC Use daily at bedtime (Patient not taking: Reported on 1/9/2023) 1 each 0   • ibuprofen (MOTRIN) 600 mg tablet Take 600 mg by "mouth every 6 (six) hours as needed for mild pain (Patient not taking: Reported on 3/22/2023)     • Insulin Pen Needle (PEN NEEDLES) 31G X 5 MM MISC by Does not apply route daily (Patient not taking: Reported on 6/14/2023) 100 each 5   • levothyroxine 125 mcg tablet  (Patient not taking: Reported on 3/22/2023)     • sodium chloride (OCEAN) 0 65 % nasal spray 1 spray into each nostril as needed for congestion (Patient not taking: Reported on 1/9/2023) 15 mL 1     No current facility-administered medications for this visit  Depression Screening and Follow-up Plan: Patient was screened for depression during today's encounter  They screened negative with a PHQ-2 score of 0  Review of Systems   Skin: Positive for wound  All other systems reviewed and are negative  Objective:  Vitals:    06/14/23 1018   BP: 132/72   BP Location: Right arm   Patient Position: Sitting   Cuff Size: Standard   Pulse: 96   Temp: 98 3 °F (36 8 °C)   TempSrc: Tympanic   SpO2: 97%   Weight: 72 1 kg (159 lb)   Height: 6' (1 829 m)     Body mass index is 21 56 kg/m²  Physical Exam  Vitals and nursing note reviewed  Cardiovascular:      Rate and Rhythm: Normal rate  Pulmonary:      Effort: Pulmonary effort is normal    Musculoskeletal:         General: Normal range of motion  Feet:    Skin:     General: Skin is dry  Capillary Refill: Capillary refill takes less than 2 seconds  Neurological:      General: No focal deficit present  Mental Status: He is alert  Portions of the record may have been created with voice recognition software  Occasional wrong word or \"sound a like\" substitutions may have occurred due to the inherent limitations of voice recognition software  Read the chart carefully and recognize, using context, where substitutions have occurred  Contact me with any questions    "

## 2023-06-14 NOTE — TELEPHONE ENCOUNTER
----- Message from Magali Yuan sent at 6/14/2023 11:31 AM EDT -----  Regarding: colonoscopy  06/14/23 11:32 AM    Hello, our patient Mari Urbina has had CRC: Colonoscopy completed/performed  Please assist in updating the patient chart by making an External outreach to [facility] facility located in [facility]  The date of service is March 2023      Thank you,  Fifi Coleman  [facility]

## 2023-06-15 NOTE — TELEPHONE ENCOUNTER
Upon review of the In Basket request and the patient's chart, initial outreach has been made via fax to facility  Please see Contacts section for details       Thank you  Alla Schmidt

## 2023-06-16 NOTE — TELEPHONE ENCOUNTER
Upon review of the request/inquiry, we are reaching out to you to ask for assistance  The original request did not include enough detail to proceed  Please provide the following details to allow for request processing; unable to locate the facility that was provided on the request  Please clarify the location where the procedure was completed       To respond with requested information, open this Encounter, navigate to the Routing section, add your response to the routing comments, add my name to the Recipient field, and select Send and Close Workspace      Thank you  Theone Bark

## 2023-06-19 DIAGNOSIS — E03.8 HYPOTHYROIDISM DUE TO HASHIMOTO'S THYROIDITIS: ICD-10-CM

## 2023-06-19 DIAGNOSIS — F41.9 ANXIETY: ICD-10-CM

## 2023-06-19 DIAGNOSIS — G23.2 PARKINSON'S PLUS SYNDROME (HCC): ICD-10-CM

## 2023-06-19 DIAGNOSIS — E78.5 HYPERLIPIDEMIA, UNSPECIFIED HYPERLIPIDEMIA TYPE: ICD-10-CM

## 2023-06-19 DIAGNOSIS — E06.3 HYPOTHYROIDISM DUE TO HASHIMOTO'S THYROIDITIS: ICD-10-CM

## 2023-06-19 RX ORDER — HYDROXYZINE PAMOATE 25 MG/1
CAPSULE ORAL
Qty: 56 CAPSULE | Refills: 0 | Status: SHIPPED | OUTPATIENT
Start: 2023-06-19

## 2023-06-19 RX ORDER — LEVOTHYROXINE SODIUM 0.15 MG/1
TABLET ORAL
Qty: 28 TABLET | Refills: 3 | Status: SHIPPED | OUTPATIENT
Start: 2023-06-19

## 2023-06-19 RX ORDER — SIMVASTATIN 40 MG
TABLET ORAL
Qty: 28 TABLET | Refills: 3 | Status: SHIPPED | OUTPATIENT
Start: 2023-06-19

## 2023-06-19 RX ORDER — BENZTROPINE MESYLATE 0.5 MG/1
TABLET ORAL
Qty: 56 TABLET | Refills: 4 | Status: SHIPPED | OUTPATIENT
Start: 2023-06-19 | End: 2023-06-27 | Stop reason: SDUPTHER

## 2023-06-27 DIAGNOSIS — G20 PARKINSON'S PLUS SYNDROME: ICD-10-CM

## 2023-06-27 RX ORDER — BENZTROPINE MESYLATE 0.5 MG/1
1 TABLET ORAL 2 TIMES DAILY
Qty: 60 TABLET | Refills: 11 | Status: SHIPPED | OUTPATIENT
Start: 2023-06-27

## 2023-07-03 ENCOUNTER — APPOINTMENT (OUTPATIENT)
Dept: LAB | Facility: HOSPITAL | Age: 58
End: 2023-07-03
Payer: MEDICARE

## 2023-07-03 LAB
ALBUMIN SERPL BCP-MCNC: 3.8 G/DL (ref 3.5–5)
ALP SERPL-CCNC: 44 U/L (ref 34–104)
ALT SERPL W P-5'-P-CCNC: 14 U/L (ref 7–52)
AMMONIA PLAS-SCNC: 54 UMOL/L (ref 18–72)
ANION GAP SERPL CALCULATED.3IONS-SCNC: 8 MMOL/L
AST SERPL W P-5'-P-CCNC: 13 U/L (ref 13–39)
BILIRUB SERPL-MCNC: 0.24 MG/DL (ref 0.2–1)
BUN SERPL-MCNC: 17 MG/DL (ref 5–25)
CALCIUM SERPL-MCNC: 8.8 MG/DL (ref 8.4–10.2)
CHLORIDE SERPL-SCNC: 100 MMOL/L (ref 96–108)
CO2 SERPL-SCNC: 28 MMOL/L (ref 21–32)
CREAT SERPL-MCNC: 0.71 MG/DL (ref 0.6–1.3)
ERYTHROCYTE [DISTWIDTH] IN BLOOD BY AUTOMATED COUNT: 13.2 % (ref 11.6–15.1)
GFR SERPL CREATININE-BSD FRML MDRD: 104 ML/MIN/1.73SQ M
GLUCOSE P FAST SERPL-MCNC: 106 MG/DL (ref 65–99)
HCT VFR BLD AUTO: 40.4 % (ref 36.5–49.3)
HGB BLD-MCNC: 13.3 G/DL (ref 12–17)
MCH RBC QN AUTO: 31.7 PG (ref 26.8–34.3)
MCHC RBC AUTO-ENTMCNC: 32.9 G/DL (ref 31.4–37.4)
MCV RBC AUTO: 96 FL (ref 82–98)
PLATELET # BLD AUTO: 160 THOUSANDS/UL (ref 149–390)
PMV BLD AUTO: 11.5 FL (ref 8.9–12.7)
POTASSIUM SERPL-SCNC: 4 MMOL/L (ref 3.5–5.3)
PROT SERPL-MCNC: 6.5 G/DL (ref 6.4–8.4)
RBC # BLD AUTO: 4.2 MILLION/UL (ref 3.88–5.62)
SODIUM SERPL-SCNC: 136 MMOL/L (ref 135–147)
VALPROATE SERPL-MCNC: 111 UG/ML (ref 50–100)
WBC # BLD AUTO: 5 THOUSAND/UL (ref 4.31–10.16)

## 2023-07-05 LAB — LEVETIRACETAM SERPL-MCNC: 48.4 UG/ML (ref 10–40)

## 2023-07-06 LAB — LACOSAMIDE SERPL-MCNC: 7.6 UG/ML (ref 5–10)

## 2023-07-07 ENCOUNTER — OFFICE VISIT (OUTPATIENT)
Dept: NEUROLOGY | Facility: CLINIC | Age: 58
End: 2023-07-07
Payer: MEDICARE

## 2023-07-07 VITALS
DIASTOLIC BLOOD PRESSURE: 60 MMHG | BODY MASS INDEX: 21.41 KG/M2 | HEIGHT: 72 IN | SYSTOLIC BLOOD PRESSURE: 110 MMHG | WEIGHT: 158.1 LBS | HEART RATE: 60 BPM | TEMPERATURE: 98.1 F

## 2023-07-07 DIAGNOSIS — G40.309 GENERALIZED CONVULSIVE EPILEPSY (HCC): Primary | Chronic | ICD-10-CM

## 2023-07-07 PROCEDURE — 99214 OFFICE O/P EST MOD 30 MIN: CPT | Performed by: NURSE PRACTITIONER

## 2023-07-07 NOTE — ASSESSMENT & PLAN NOTE
No breakthrough GTC/convulsive seizures or myoclonic seizures since starting zonisamide after his last visit. He is currently on Depakote -100 (BRAND ONLY), lacosamide 200 mg BID, zonisamide 300 mg HS, and levetiracetam 1500 mg BID. Since he has been doing well, will continue current medications. Recommend follow up in 6 weeks. If he continues to do well, consider decreasing lacosamide slowly. Will also have follow up 3 months after with Dr Naila Garcia.

## 2023-07-07 NOTE — PATIENT INSTRUCTIONS
- Continue current medications  - Call the office with further seizures or concerns (with seizures, would likely increase zonisamide)  - If he continues to do well at follow up, we will likely start weaning off of lacosamide  - Follow up in 6 weeks with Ailyn then 3 months after with Dr Vernette Severance

## 2023-07-07 NOTE — PROGRESS NOTES
Review of Systems   Constitutional: Negative for appetite change, fatigue and fever. HENT: Negative. Negative for hearing loss, tinnitus, trouble swallowing and voice change. Eyes: Negative. Negative for photophobia, pain and visual disturbance. Respiratory: Negative. Negative for shortness of breath. Cardiovascular: Negative. Negative for palpitations. Gastrointestinal: Negative. Negative for nausea and vomiting. Endocrine: Negative. Negative for cold intolerance. Genitourinary: Negative. Negative for dysuria, frequency and urgency. Musculoskeletal: Negative for back pain, gait problem, myalgias and neck pain. Skin: Negative. Negative for rash. Allergic/Immunologic: Negative. Neurological: Positive for seizures (no sz). Negative for dizziness, tremors, syncope, facial asymmetry, speech difficulty, weakness, light-headedness, numbness and headaches. Hematological: Negative. Does not bruise/bleed easily. Psychiatric/Behavioral: Negative. Negative for confusion, hallucinations and sleep disturbance. All other systems reviewed and are negative.

## 2023-07-07 NOTE — PROGRESS NOTES
Patient ID: Elton Dominique is a 66 y.o. male with cerebral palsy, generalized epilepsy, and behavioral disorder, who is returning to Neurology office for follow up of his seizures. Assessment/Plan:    Generalized convulsive epilepsy ([address])  No breakthrough GTC/convulsive seizures or myoclonic seizures since starting zonisamide after his last visit. He is currently on Depakote -100 (BRAND ONLY), lacosamide 200 mg BID, zonisamide 300 mg HS, and levetiracetam 1500 mg BID. Since he has been doing well, will continue current medications. Recommend follow up in 6 weeks. If he continues to do well, consider decreasing lacosamide slowly. Will also have follow up 3 months after with Dr Corrie Escobedo. Cerebral palsy (HCC)  Stable. Resides in group home with 24 hour supervision. He will Return for 6 weeks with Ailyn, 3 months after with Dr Corrie Escobedo. Subjective:  Elton Dominique is a 66 y.o. male with cerebral palsy, generalized epilepsy, and behavioral disorder, who is returning to Neurology office for follow up of his seizures. At his last visit, he was still having occasional myoclonic seizures without GTC seizures. He had multiple falls including while walking into the office. Recommended restarting zonisamide to goal dose of 300 mg HS with plan to eventually come off of lacosamide to improve symptoms. Recommended 6 week follow up. Since their last visit, there have been no seizures or jerking. There was another fall shortly after his visit and was seen in the ER on 5/22 but sicne then there were no further falls. He continues to have difficulty with balance but this seems to be stable per his caretaker. He seems to be doing well since his last visit. Current seizure medications:  - zonisamide 300 mg HS  - levetiracetam 1500 mg BID  - lacosamide 200 mg BID  - Depakote -1000 (BRAND ONLY)  - gabapentin 300 mg TID  - clonazepam 0.25 mg PRN  Other medications as per Epic.     Prior Seizure Medications: zonisamide (stopped due to thrombocytopenia)    His history was also obtained from his caretaker, who was present at today's visit. I reviewed prior neurology notes, most recent labs, as documented in Epic/[facility], and summarized above. Objective:    Blood pressure 110/60, pulse 60, temperature 98.1 °F (36.7 °C), temperature source Temporal, height 6' (1.829 m), weight 71.7 kg (158 lb 1.6 oz). Physical Exam  No apparent distress. Appears well nourished.   Mood appropriate for situation     Neurologic Exam  Mental status- alert and oriented to person, place, and time. Speech with appropriate responses, slow at times. Limited attention and knowledge regarding medical situation.      Cranial Nerves- PERRL, EOMS normal, facial sensation and muscles symmetric, hearing intact bilaterally to finger rubs, tongue midline, palate rise symmetrical, shoulder shrug symmetrical.     Motor- No pronator drift. Appropriate strength. Moves all extremities freely. No tremor, jerks.      Sensory-  Intact distally in all extremities to light touch.      DTRs- 2+ and brisk in bilateral lower extremities. 2+ and symmetric in bilateral upper extremities.      Gait- wide based unsteady gait.      Coordination- FNF slow but intact. ROS:  Review of Systems   Constitutional: Negative for appetite change, fatigue and fever. HENT: Negative. Negative for hearing loss, tinnitus, trouble swallowing and voice change. Eyes: Negative. Negative for photophobia, pain and visual disturbance. Respiratory: Negative. Negative for shortness of breath. Cardiovascular: Negative. Negative for palpitations. Gastrointestinal: Negative. Negative for nausea and vomiting. Endocrine: Negative. Negative for cold intolerance. Genitourinary: Negative. Negative for dysuria, frequency and urgency. Musculoskeletal: Negative for back pain, gait problem, myalgias and neck pain. Skin: Negative.   Negative for rash.   Allergic/Immunologic: Negative. Neurological: Positive for seizures (no sz). Negative for dizziness, tremors, syncope, facial asymmetry, speech difficulty, weakness, light-headedness, numbness and headaches. Hematological: Negative. Does not bruise/bleed easily. Psychiatric/Behavioral: Negative. Negative for confusion, hallucinations and sleep disturbance. All other systems reviewed and are negative. ROS obtained by MA and reviewed by myself. This note may have been created using voice recognition software. There may be unintentional errors such as grammatical errors, spelling errors, or pronoun errors.

## 2023-07-26 ENCOUNTER — OFFICE VISIT (OUTPATIENT)
Dept: FAMILY MEDICINE CLINIC | Facility: CLINIC | Age: 58
End: 2023-07-26

## 2023-07-26 VITALS
WEIGHT: 158 LBS | OXYGEN SATURATION: 97 % | DIASTOLIC BLOOD PRESSURE: 72 MMHG | HEIGHT: 72 IN | BODY MASS INDEX: 21.4 KG/M2 | SYSTOLIC BLOOD PRESSURE: 126 MMHG | TEMPERATURE: 96.8 F | HEART RATE: 66 BPM

## 2023-07-26 DIAGNOSIS — E78.5 HYPERLIPIDEMIA, UNSPECIFIED HYPERLIPIDEMIA TYPE: ICD-10-CM

## 2023-07-26 DIAGNOSIS — E11.9 TYPE 2 DIABETES MELLITUS WITHOUT COMPLICATION, WITHOUT LONG-TERM CURRENT USE OF INSULIN (HCC): Primary | Chronic | ICD-10-CM

## 2023-07-26 DIAGNOSIS — E03.8 HYPOTHYROIDISM DUE TO HASHIMOTO'S THYROIDITIS: ICD-10-CM

## 2023-07-26 DIAGNOSIS — Z12.11 SCREENING FOR COLON CANCER: ICD-10-CM

## 2023-07-26 DIAGNOSIS — I10 HYPERTENSION, UNSPECIFIED TYPE: ICD-10-CM

## 2023-07-26 DIAGNOSIS — K59.00 CONSTIPATION, UNSPECIFIED CONSTIPATION TYPE: ICD-10-CM

## 2023-07-26 DIAGNOSIS — E53.8 B12 DEFICIENCY: ICD-10-CM

## 2023-07-26 DIAGNOSIS — E06.3 HYPOTHYROIDISM DUE TO HASHIMOTO'S THYROIDITIS: ICD-10-CM

## 2023-07-26 DIAGNOSIS — F41.9 ANXIETY: ICD-10-CM

## 2023-07-26 DIAGNOSIS — F69 BEHAVIOR CONCERN IN ADULT: ICD-10-CM

## 2023-07-26 DIAGNOSIS — L89.153 PRESSURE ULCER OF SACRAL REGION, STAGE 3 (HCC): ICD-10-CM

## 2023-07-26 DIAGNOSIS — R32 URINARY INCONTINENCE, UNSPECIFIED TYPE: ICD-10-CM

## 2023-07-26 DIAGNOSIS — G40.309 GENERALIZED CONVULSIVE EPILEPSY (HCC): ICD-10-CM

## 2023-07-26 DIAGNOSIS — E08.00 DIABETES MELLITUS DUE TO UNDERLYING CONDITION WITH HYPEROSMOLARITY WITHOUT COMA, WITHOUT LONG-TERM CURRENT USE OF INSULIN (HCC): ICD-10-CM

## 2023-07-26 RX ORDER — SIMVASTATIN 40 MG
40 TABLET ORAL
Qty: 90 TABLET | Refills: 1 | Status: SHIPPED | OUTPATIENT
Start: 2023-07-26

## 2023-07-26 RX ORDER — LANOLIN ALCOHOL/MO/W.PET/CERES
1000 CREAM (GRAM) TOPICAL DAILY
Qty: 30 TABLET | Refills: 5 | Status: SHIPPED | OUTPATIENT
Start: 2023-07-26

## 2023-07-26 RX ORDER — LISINOPRIL 10 MG/1
10 TABLET ORAL DAILY
Qty: 90 TABLET | Refills: 1 | Status: SHIPPED | OUTPATIENT
Start: 2023-07-26

## 2023-07-26 RX ORDER — LEVOTHYROXINE SODIUM 0.15 MG/1
150 TABLET ORAL DAILY
Qty: 90 TABLET | Refills: 1 | Status: SHIPPED | OUTPATIENT
Start: 2023-07-26

## 2023-07-26 RX ORDER — BROMPHENIRAMIN/PSEUDOEPHEDRINE 1-15MG/5ML
LIQUID (ML) ORAL DAILY
Qty: 20 EACH | Refills: 11 | Status: SHIPPED | OUTPATIENT
Start: 2023-07-26

## 2023-07-26 RX ORDER — HYDROXYZINE PAMOATE 25 MG/1
CAPSULE ORAL
Qty: 56 CAPSULE | Refills: 0 | Status: SHIPPED | OUTPATIENT
Start: 2023-07-26

## 2023-07-26 RX ORDER — DOCUSATE SODIUM 100 MG/1
100 CAPSULE, LIQUID FILLED ORAL 2 TIMES DAILY
Qty: 180 CAPSULE | Refills: 1 | Status: SHIPPED | OUTPATIENT
Start: 2023-07-26

## 2023-07-26 RX ORDER — QUETIAPINE 200 MG/1
TABLET, FILM COATED, EXTENDED RELEASE ORAL
Qty: 180 TABLET | Refills: 1 | Status: SHIPPED | OUTPATIENT
Start: 2023-07-26

## 2023-07-26 NOTE — PROGRESS NOTES
Assessment/Plan:    Problem List Items Addressed This Visit     Type 2 diabetes mellitus without complication, without long-term current use of insulin (HCC) - Primary (Chronic)    Relevant Medications    metFORMIN (GLUCOPHAGE) 1000 MG tablet    Generalized convulsive epilepsy (HCC) (Chronic)    Relevant Medications    QUEtiapine (SEROquel XR) 200 mg 24 hr tablet    Hyperlipidemia    Relevant Medications    simvastatin (ZOCOR) 40 mg tablet    Behavior concern in adult    Relevant Medications    QUEtiapine (SEROquel XR) 200 mg 24 hr tablet    Disposable Gloves (Safe-Sense Glove-Blk-Nitrl-L) MISC    Pressure ulcer of sacral region, stage 3 (HCC)   Other Visit Diagnoses     Screening for colon cancer        Relevant Orders    Cologuard    B12 deficiency        Relevant Medications    vitamin B-12 (VITAMIN B-12) 1,000 mcg tablet    Diabetes mellitus due to underlying condition with hyperosmolarity without coma, without long-term current use of insulin (HCC)        Relevant Medications    metFORMIN (GLUCOPHAGE) 1000 MG tablet    Hypertension, unspecified type        Relevant Medications    lisinopril (ZESTRIL) 10 mg tablet    Hypothyroidism due to Hashimoto's thyroiditis        Relevant Medications    levothyroxine 150 mcg tablet    Anxiety        Relevant Medications    hydrOXYzine pamoate (VISTARIL) 25 mg capsule    Constipation, unspecified constipation type        Relevant Medications    docusate sodium (COLACE) 100 mg capsule    Urinary incontinence, unspecified type        Relevant Medications    Incontinence Supply Disposable (Briefs Overnight Medium) MISC    Disposable Gloves (Safe-Sense Glove-Blk-Nitrl-L) MISC           Diagnoses and all orders for this visit:    Type 2 diabetes mellitus without complication, without long-term current use of insulin ([address])  -     Cancel: [facility] Diabetic eye exam    Screening for colon cancer  -     Cologuard    Pressure ulcer of sacral region, stage 3 (HCC)    B12 deficiency  - vitamin B-12 (VITAMIN B-12) 1,000 mcg tablet; Take 1 tablet (1,000 mcg total) by mouth daily    Generalized convulsive epilepsy (HCC)  -     QUEtiapine (SEROquel XR) 200 mg 24 hr tablet; TAKE 1 TABLET BY MOUTH TWICE A DAY (8AM,2PM)    Behavior concern in adult  -     QUEtiapine (SEROquel XR) 200 mg 24 hr tablet; TAKE 1 TABLET BY MOUTH TWICE A DAY (8AM,2PM)  -     Disposable Gloves (Safe-Sense Glove-Blk-Nitrl-L) MISC; Use 1 each 3 (three) times a day as needed (care taker assisting with soiled briefs)    Diabetes mellitus due to underlying condition with hyperosmolarity without coma, without long-term current use of insulin (HCC)  -     metFORMIN (GLUCOPHAGE) 1000 MG tablet; Take 1 tablet (1,000 mg total) by mouth 2 (two) times a day with meals    Hypertension, unspecified type  -     lisinopril (ZESTRIL) 10 mg tablet; Take 1 tablet (10 mg total) by mouth daily    Hyperlipidemia, unspecified hyperlipidemia type  -     simvastatin (ZOCOR) 40 mg tablet; Take 1 tablet (40 mg total) by mouth daily at bedtime    Hypothyroidism due to Hashimoto's thyroiditis  -     levothyroxine 150 mcg tablet; Take 1 tablet (150 mcg total) by mouth daily    Anxiety  -     hydrOXYzine pamoate (VISTARIL) 25 mg capsule; Give 1 capsule PO in the AM and around 2PM for anxiety    Constipation, unspecified constipation type  -     docusate sodium (COLACE) 100 mg capsule; Take 1 capsule (100 mg total) by mouth 2 (two) times a day    Urinary incontinence, unspecified type  -     Incontinence Supply Disposable (Briefs Overnight Medium) MISC; Use in the morning  -     Disposable Gloves (Safe-Sense Glove-Blk-Nitrl-L) MISC; Use 1 each 3 (three) times a day as needed (care taker assisting with soiled briefs)        No problem-specific Assessment & Plan notes found for this encounter. Subjective:      Patient ID: Bertrand Kang is a 66 y.o. male. Presents for a routine 4 month DM visit.     Diabetes  He presents for his follow-up diabetic visit. He has type 2 diabetes mellitus. His disease course has been stable. There are no diabetic associated symptoms. There are no hypoglycemic complications. Symptoms are stable. There are no diabetic complications. Risk factors for coronary artery disease include diabetes mellitus. He is following a generally healthy diet. Meal planning includes avoidance of concentrated sweets. An ACE inhibitor/angiotensin II receptor blocker is being taken. Eye exam is current. Thyroid Problem  Presents for follow-up visit. The symptoms have been stable. Anxiety  Presents for follow-up visit. Symptoms occur most days. The severity of symptoms is moderate. The quality of sleep is fair. Nighttime awakenings: occasional.     Compliance with medications is %. Hypertension  This is a chronic problem. The problem is controlled. Associated symptoms include anxiety. There are no associated agents to hypertension. Risk factors for coronary artery disease include diabetes mellitus, male gender, dyslipidemia and sedentary lifestyle. Past treatments include ACE inhibitors. The current treatment provides significant improvement. There are no compliance problems. Identifiable causes of hypertension include a thyroid problem. A1c noted   Lab Results   Component Value Date    HGBA1C 5.6 03/22/2023        Recommend lifestyle and dietary changes which include plant based low glycemic diet. Will monitor in 4  months.          The following portions of the patient's history were reviewed and updated as appropriate:   He has a past medical history of ADRIANA (acute kidney injury) ([address]) (9/24/2019), Bacteremia due to Gram-positive bacteria (9/7/2021), Buttock wound, left, subsequent encounter (11/5/2021), COVID-19, CP (cerebral palsy) ([address]), Depression, Diabetes ([address]), Disease of thyroid gland, Gait disorder, Gluteal abscess (9/6/2021), Hyperlipidemia, Hypertension, Kidney failure, Kidney stones, Moderate protein-calorie malnutrition ([address]) (12/22/2021), Osteoporosis, Pressure injury of left buttock, stage 4 ([address]) (3/9/2022), Psychiatric disorder, Scoliosis, Seizures ([address]), Sepsis ([address]) (9/25/2019), Thyroid disease, and UTI (urinary tract infection). ,  does not have any pertinent problems on file. ,   has a past surgical history that includes Appendectomy; IR PICC placement single lumen (9/13/2021); and IR PICC placement single lumen (9/16/2021). ,  family history is not on file. ,   reports that he has never smoked. He has never used smokeless tobacco. He reports that he does not drink alcohol and does not use drugs. ,  is allergic to erythromycin and penicillins. .  Current Outpatient Medications   Medication Sig Dispense Refill   • benztropine (COGENTIN) 0.5 mg tablet Take 2 tablets (1 mg total) by mouth 2 (two) times a day 60 tablet 11   • calcium carbonate (OYSTER SHELL,OSCAL) 500 mg Take 1 tablet by mouth 3 (three) times a day 240 tablet 3   • cholecalciferol (VITAMIN D3) 1,000 units tablet Take 1 tablet (1,000 Units total) by mouth daily 28 tablet 0   • clonazePAM (KlonoPIN) 0.25 MG disintegrating tablet Take 1 tablet (0.25 mg total) by mouth as needed for seizures (clusters of myoclonic jerking (more than 2 myoclonic jerks in a day)) for up to 1 dose 10 tablet 2   • Depakote 500 MG DR tablet GIVE 1 TABLET BY MOUTH EVERY MORNING **BRAND NAME ONLY** ~ GIVE 2 TABLETS BY MOUTH AT BEDTIME (=1000MG) FOR SEIZURE DISORDER DR. LIVE JONES 90 tablet 11   • Depakote  MG 24 hr tablet Take 1 tablet (250 mg total) by mouth daily Take in the morning with one 500 mg tablet (total morning dose of 750 mg).  BRAND ONLY 90 tablet 1   • Disposable Gloves (Safe-Sense Glove-Blk-Nitrl-L) MISC Use 1 each 3 (three) times a day as needed (care taker assisting with soiled briefs) 100 each 1   • docusate sodium (COLACE) 100 mg capsule Take 1 capsule (100 mg total) by mouth 2 (two) times a day 180 capsule 1   • gabapentin (NEURONTIN) 300 mg capsule • hydrOXYzine pamoate (VISTARIL) 25 mg capsule Give 1 capsule PO in the AM and around 2PM for anxiety 56 capsule 0   • ibuprofen (MOTRIN) 600 mg tablet Take 600 mg by mouth every 6 (six) hours as needed for mild pain     • Incontinence Supply Disposable (Briefs Overnight Medium) MISC Use in the morning 20 each 11   • lacosamide (VIMPAT) 200 mg tablet GIVE 1 TABLET BY MOUTH EVERY 12 HOUR(S) FOR SEIZURE DISORDER 60 tablet 2   • levETIRAcetam (KEPPRA) 750 mg tablet GIVE 2 TABLETS BY MOUTH TWICE DAILY (=1500MG) FOR SEIZURE DISORDER DR. LIVE JONES 120 tablet 11   • levothyroxine 125 mcg tablet      • levothyroxine 150 mcg tablet Take 1 tablet (150 mcg total) by mouth daily 90 tablet 1   • lisinopril (ZESTRIL) 10 mg tablet Take 1 tablet (10 mg total) by mouth daily 90 tablet 1   • loratadine (CLARITIN) 10 mg tablet Take 10 mg by mouth daily     • metFORMIN (GLUCOPHAGE) 1000 MG tablet Take 1 tablet (1,000 mg total) by mouth 2 (two) times a day with meals 240 tablet 0   • Multiple Vitamin (Daily-Reina) TABS Take 1 tablet by mouth daily Take at 8 AM 90 tablet 11   • QUEtiapine (SEROquel XR) 200 mg 24 hr tablet TAKE 1 TABLET BY MOUTH TWICE A DAY (8AM,2PM) 180 tablet 1   • QUEtiapine (SEROquel XR) 400 mg 24 hr tablet TAKE 1 TABLET BY MOUTH AT BEDTIME (8PM) (DX: ANTIPSYCHOTIC) 90 tablet 1   • simvastatin (ZOCOR) 40 mg tablet Take 1 tablet (40 mg total) by mouth daily at bedtime 90 tablet 1   • temazepam (RESTORIL) 15 mg capsule Take 1 capsule (15 mg total) by mouth daily at bedtime as needed for sleep 30 capsule 1   • vitamin B-12 (VITAMIN B-12) 1,000 mcg tablet Take 1 tablet (1,000 mcg total) by mouth daily 30 tablet 5   • zonisamide (ZONEGRAN) 100 mg capsule Take 100 mg nightly for 1 wk, then take 200 mg nightly for 1 wk, then take 300 mg nightly.  90 capsule 11   • Blood Glucose Monitoring Suppl (Reese Bee) w/Device KIT by Does not apply route 3 (three) times a day TEST BLOOD SUGARS THREE TIMES (Patient not taking: Reported on 7/7/2023) 1 kit 0   • Humidifiers (Cool Mist Humidifier 1 gallon) MISC Use daily at bedtime (Patient not taking: Reported on 1/9/2023) 1 each 0   • Insulin Pen Needle (PEN NEEDLES) 31G X 5 MM MISC by Does not apply route daily (Patient not taking: Reported on 6/14/2023) 100 each 5   • OneTouch Delica Lancets 22V MISC by Does not apply route daily TEST BLOOD SUGARS THREE TIMES DAILY (Patient not taking: Reported on 7/7/2023) 100 each 2     No current facility-administered medications for this visit. Review of Systems   All other systems reviewed and are negative. Objective:  Vitals:    07/26/23 0959   BP: 126/72   BP Location: Left arm   Patient Position: Sitting   Cuff Size: Adult   Pulse: 66   Temp: (!) 96.8 °F (36 °C)   TempSrc: Tympanic   SpO2: 97%   Weight: 71.7 kg (158 lb)   Height: 6' (1.829 m)     Body mass index is 21.43 kg/m². Physical Exam  Vitals and nursing note reviewed. Constitutional:       Appearance: Normal appearance. He is well-developed. HENT:      Head: Normocephalic and atraumatic. Right Ear: Tympanic membrane, ear canal and external ear normal.      Left Ear: Tympanic membrane, ear canal and external ear normal.      Nose: Nose normal.      Mouth/Throat:      Mouth: Mucous membranes are moist.      Pharynx: Uvula midline. Eyes:      General: Lids are normal.      Conjunctiva/sclera: Conjunctivae normal.      Pupils: Pupils are equal, round, and reactive to light. Neck:      Thyroid: No thyroid mass. Vascular: No JVD. Trachea: Trachea and phonation normal.   Cardiovascular:      Rate and Rhythm: Normal rate and regular rhythm. Pulses: Normal pulses. no weak pulses          Dorsalis pedis pulses are 2+ on the right side and 2+ on the left side. Posterior tibial pulses are 2+ on the right side and 2+ on the left side. Heart sounds: Normal heart sounds, S1 normal and S2 normal. No murmur heard. No friction rub.  No gallop. Pulmonary:      Effort: Pulmonary effort is normal.      Breath sounds: Normal breath sounds. Abdominal:      General: Bowel sounds are normal.      Palpations: Abdomen is soft. Tenderness: There is no abdominal tenderness. Genitourinary:     Comments: Deferred  Musculoskeletal:         General: Normal range of motion. Cervical back: Full passive range of motion without pain, normal range of motion and neck supple. Right lower leg: No edema. Left lower leg: No edema. Feet:      Right foot:      Skin integrity: Warmth and dry skin present. No ulcer, skin breakdown, erythema or callus. Left foot:      Skin integrity: Warmth and dry skin present. No ulcer, skin breakdown, erythema or callus. Lymphadenopathy:      Head:      Right side of head: No submental, submandibular, tonsillar, preauricular, posterior auricular or occipital adenopathy. Left side of head: No submental, submandibular, tonsillar, preauricular, posterior auricular or occipital adenopathy. Cervical: No cervical adenopathy. Skin:     General: Skin is warm and dry. Capillary Refill: Capillary refill takes less than 2 seconds. Neurological:      General: No focal deficit present. Mental Status: He is alert and oriented to person, place, and time. Sensory: Sensation is intact. Motor: Motor function is intact. Coordination: Coordination is intact. Gait: Gait is intact. Psychiatric:         Attention and Perception: Attention and perception normal.         Mood and Affect: Mood and affect normal.         Speech: Speech normal.         Behavior: Behavior normal. Behavior is cooperative. Thought Content: Thought content normal.         Cognition and Memory: Cognition normal.         Judgment: Judgment normal.           Patient's shoes and socks removed. Right Foot/Ankle   Right Foot Inspection  Skin Exam: skin normal, skin intact, dry skin and warmth.  No callus, no erythema, no maceration, no abnormal color, no pre-ulcer, no ulcer and no callus. Toe Exam: ROM and strength within normal limits. No swelling, no tenderness, erythema and  no right toe deformity    Sensory   Vibration: intact  Proprioception: intact  Monofilament testing: intact    Vascular  Capillary refills: < 3 seconds  The right DP pulse is 2+. The right PT pulse is 2+. Right Toe  - Comprehensive Exam  Ecchymosis: none  Arch: normal  Hammertoes: absent  Claw Toes: absent  Swelling: none   Tenderness: none         Left Foot/Ankle  Left Foot Inspection  Skin Exam: skin normal, skin intact, dry skin and warmth. No erythema, no maceration, normal color, no pre-ulcer, no ulcer and no callus. Toe Exam: ROM and strength within normal limits. No swelling, no tenderness, no erythema and no left toe deformity. Sensory   Vibration: intact  Proprioception: intact  Monofilament testing: intact    Vascular  Capillary refills: < 3 seconds  The left DP pulse is 2+. The left PT pulse is 2+. Left Toe  - Comprehensive Exam  Ecchymosis: none  Arch: normal  Hammertoes: absent  Claw toes: absent  Swelling: none   Tenderness: none           Assign Risk Category  No deformity present  No loss of protective sensation  No weak pulses  Risk: 0      No visits with results within 1 Month(s) from this visit.    Latest known visit with results is:   Admission on 05/22/2023, Discharged on 05/22/2023   Component Date Value Ref Range Status   • WBC 05/22/2023 6.65  4.31 - 10.16 Thousand/uL Final   • RBC 05/22/2023 4.35  3.88 - 5.62 Million/uL Final   • Hemoglobin 05/22/2023 14.0  12.0 - 17.0 g/dL Final   • Hematocrit 05/22/2023 42.3  36.5 - 49.3 % Final   • MCV 05/22/2023 97  82 - 98 fL Final   • MCH 05/22/2023 32.2  26.8 - 34.3 pg Final   • MCHC 05/22/2023 33.1  31.4 - 37.4 g/dL Final   • RDW 05/22/2023 13.4  11.6 - 15.1 % Final   • MPV 05/22/2023 11.1  8.9 - 12.7 fL Final   • Platelets 79/65/8079 185  149 - 390 Thousands/uL Final   • nRBC 05/22/2023 0  /100 WBCs Final   • Neutrophils Relative 05/22/2023 50  43 - 75 % Final   • Immat GRANS % 05/22/2023 1  0 - 2 % Final   • Lymphocytes Relative 05/22/2023 29  14 - 44 % Final   • Monocytes Relative 05/22/2023 17 (H)  4 - 12 % Final   • Eosinophils Relative 05/22/2023 2  0 - 6 % Final   • Basophils Relative 05/22/2023 1  0 - 1 % Final   • Neutrophils Absolute 05/22/2023 3.32  1.85 - 7.62 Thousands/µL Final   • Immature Grans Absolute 05/22/2023 0.03  0.00 - 0.20 Thousand/uL Final   • Lymphocytes Absolute 05/22/2023 1.95  0.60 - 4.47 Thousands/µL Final   • Monocytes Absolute 05/22/2023 1.13  0.17 - 1.22 Thousand/µL Final   • Eosinophils Absolute 05/22/2023 0.16  0.00 - 0.61 Thousand/µL Final   • Basophils Absolute 05/22/2023 0.06  0.00 - 0.10 Thousands/µL Final   • Sodium 05/22/2023 136  135 - 147 mmol/L Final   • Potassium 05/22/2023 5.7 (H)  3.5 - 5.3 mmol/L Final    Moderately Hemolyzed:Results may be affected. • Chloride 05/22/2023 100  96 - 108 mmol/L Final   • CO2 05/22/2023 28  21 - 32 mmol/L Final   • ANION GAP 05/22/2023 8  4 - 13 mmol/L Final   • BUN 05/22/2023 18  5 - 25 mg/dL Final   • Creatinine 05/22/2023 0.68  0.60 - 1.30 mg/dL Final    Standardized to IDMS reference method   • Glucose 05/22/2023 97  65 - 140 mg/dL Final    If the patient is fasting, the ADA then defines impaired fasting glucose as > 100 mg/dL and diabetes as > or equal to 123 mg/dL. • Calcium 05/22/2023 9.5  8.4 - 10.2 mg/dL Final   • AST 05/22/2023 39  13 - 39 U/L Final    Moderately Hemolyzed:Results may be affected. • ALT 05/22/2023 18  7 - 52 U/L Final    Specimen collection should occur prior to Sulfasalazine administration due to the potential for falsely depressed results. • Alkaline Phosphatase 05/22/2023 49  34 - 104 U/L Final   • Total Protein 05/22/2023 7.4  6.4 - 8.4 g/dL Final    Moderately Hemolyzed:Results may be affected.    • Albumin 05/22/2023 4.4  3.5 - 5.0 g/dL Final • Total Bilirubin 05/22/2023 0.38  0.20 - 1.00 mg/dL Final    Use of this assay is not recommended for patients undergoing treatment with eltrombopag due to the potential for falsely elevated results. N-acetyl-p-benzoquinone imine (metabolite of Acetaminophen) will generate erroneously low results in samples for patients that have taken an overdose of Acetaminophen. • eGFR 05/22/2023 106  ml/min/1.73sq m Final   • Valproic Acid, Total 05/22/2023 100  50 - 100 ug/mL Final   • Lacosamide 05/22/2023 12.9 (H)  5.0 - 10.0 ug/mL Final    This test was developed and its performance characteristics  determined by Aysha Lopez. It has not been cleared or approved  by the Food and Drug Administration. Limit of Detection 0.5    Mean plasma concentrations following maintenance dose                          200 mg/day  4.99 +/- 2.51 ug/mL                          400 mg/day  9.35 +/- 4.22 ug/mL                          600 mg/day 12.46 +/- 5.60 ug/mL   • Levetiracetam Lvl 05/22/2023 <2.0 (L)  10.0 - 40.0 ug/mL Final   • Ammonia 05/22/2023 42  18 - 72 umol/L Final         I have reviewed all the lab results. There are some abnormalities that are not critical to the patient's health, I have discussed these in person at this office appointment. Portions of the record may have been created with voice recognition software. Occasional wrong word or "sound a like" substitutions may have occurred due to the inherent limitations of voice recognition software. Read the chart carefully and recognize, using context, where substitutions have occurred. Contact me with any questions.

## 2023-09-01 DIAGNOSIS — G40.309 GENERALIZED CONVULSIVE EPILEPSY (HCC): ICD-10-CM

## 2023-09-01 RX ORDER — LACOSAMIDE 200 MG/1
TABLET ORAL
Qty: 60 TABLET | Refills: 11 | Status: SHIPPED | OUTPATIENT
Start: 2023-09-01

## 2023-09-01 NOTE — TELEPHONE ENCOUNTER
Tonya Mio Oconnell,[birth year] needs vimpat 200 milligrams called into [facility]. He has no refills, so please call me back when that's been called in. Thank you. [phone]. Thank you. Rx entered.  Pls review and sign off     thanks

## 2023-09-11 DIAGNOSIS — E55.9 VITAMIN D DEFICIENCY: ICD-10-CM

## 2023-09-11 DIAGNOSIS — I10 HYPERTENSION, UNSPECIFIED TYPE: ICD-10-CM

## 2023-09-11 DIAGNOSIS — E08.00 DIABETES MELLITUS DUE TO UNDERLYING CONDITION WITH HYPEROSMOLARITY WITHOUT COMA, WITHOUT LONG-TERM CURRENT USE OF INSULIN (HCC): ICD-10-CM

## 2023-09-11 DIAGNOSIS — K59.00 CONSTIPATION, UNSPECIFIED CONSTIPATION TYPE: ICD-10-CM

## 2023-09-11 RX ORDER — LISINOPRIL 10 MG/1
TABLET ORAL
Qty: 28 TABLET | Refills: 0 | Status: SHIPPED | OUTPATIENT
Start: 2023-09-11

## 2023-09-11 RX ORDER — MELATONIN
Qty: 28 TABLET | Refills: 0 | Status: SHIPPED | OUTPATIENT
Start: 2023-09-11

## 2023-09-11 RX ORDER — DOCUSATE SODIUM 100 MG/1
CAPSULE, LIQUID FILLED ORAL
Qty: 56 CAPSULE | Refills: 0 | Status: SHIPPED | OUTPATIENT
Start: 2023-09-11

## 2023-09-28 ENCOUNTER — TELEPHONE (OUTPATIENT)
Dept: NEUROLOGY | Facility: CLINIC | Age: 58
End: 2023-09-28

## 2023-09-28 ENCOUNTER — OFFICE VISIT (OUTPATIENT)
Dept: NEUROLOGY | Facility: CLINIC | Age: 58
End: 2023-09-28
Payer: MEDICARE

## 2023-09-28 VITALS
DIASTOLIC BLOOD PRESSURE: 82 MMHG | SYSTOLIC BLOOD PRESSURE: 130 MMHG | HEART RATE: 89 BPM | WEIGHT: 159.6 LBS | OXYGEN SATURATION: 95 % | TEMPERATURE: 98 F | HEIGHT: 72 IN | BODY MASS INDEX: 21.62 KG/M2

## 2023-09-28 DIAGNOSIS — G40.309 GENERALIZED CONVULSIVE EPILEPSY (HCC): ICD-10-CM

## 2023-09-28 DIAGNOSIS — G80.9 CEREBRAL PALSY, UNSPECIFIED TYPE (HCC): Primary | ICD-10-CM

## 2023-09-28 PROCEDURE — 99215 OFFICE O/P EST HI 40 MIN: CPT | Performed by: NURSE PRACTITIONER

## 2023-09-28 RX ORDER — LACOSAMIDE 150 MG/1
TABLET ORAL
Qty: 30 TABLET | Refills: 2 | Status: SHIPPED | OUTPATIENT
Start: 2023-09-28

## 2023-09-28 RX ORDER — LACOSAMIDE 200 MG/1
TABLET ORAL
Qty: 30 TABLET | Refills: 2 | Status: SHIPPED | OUTPATIENT
Start: 2023-09-28

## 2023-09-28 NOTE — TELEPHONE ENCOUNTER
[place] - care giver for pt called today if she could bring pt earlier than 130pm today (scheduled appt 09/28/23 at 130pm) upon review advised that we no open slots. We did have a 8am but its now after 10am so that wouldn't work. Ariane Gutierrez understood and thanked me for at least checking.

## 2023-09-28 NOTE — PATIENT INSTRUCTIONS
Decrease morning dose of lacosamide to 150 mg.  Continue 200 mg at night time   - If there are any seizures with this change please let us know   - His balance may improve with decreasing the morning dose  - Continue other seizure medications unchanged:  - zonisamide 300 mg at night  - levetiracetam 1500 mg twice per day  - gabapentin 300 mg three times per day  - Depakote  mg in the morning and 1000 mg at night (BRAND ONLY)  - clonazepam 0.25 mg as needed for seizures  - Call the office with breakthrough seizures or concerns  - Continue to work with psychiatry regarding behaviors  - Follow up in 3 months with Dr Rasheed Viveros

## 2023-09-28 NOTE — ASSESSMENT & PLAN NOTE
Stable although caretaker reports some worsening of balance recently. Decreasing lacosamide may improve balance. Resides in group home with 24 hour supervision.

## 2023-09-28 NOTE — ASSESSMENT & PLAN NOTE
No breakthrough GTC/convulsive seizures or myoclonic seizures since starting zonisamide. He is currently on Depakote -1000 (BRAND ONLY), lacosamide 200 mg BID, zonisamide 300 mg HS, and levetiracetam 1500 mg BID. Gabapentin 300 mg TID prescribed by psychiatry. Since he has continued to be seizure free, will start decreasing lacosamide. This medication could be contributing to his balance difficulties. Decreasing lacosamide to 150 mg in the morning and 200 mg at night. He will continue on other AEDs unchanged. He will follow up in 6 weeks with Dr Myles Alamo as scheduled. Blood work to be done prior to follow up.

## 2023-09-28 NOTE — PROGRESS NOTES
Patient ID: Macario Torres is a 66 y.o. male with cerebral palsy, generalized epilepsy, and behavioral disorder, who is returning to Neurology office for follow up of his seizures. Assessment/Plan:    Generalized convulsive epilepsy ([address])  No breakthrough GTC/convulsive seizures or myoclonic seizures since starting zonisamide. He is currently on Depakote -1000 (BRAND ONLY), lacosamide 200 mg BID, zonisamide 300 mg HS, and levetiracetam 1500 mg BID. Gabapentin 300 mg TID prescribed by psychiatry. Since he has continued to be seizure free, will start decreasing lacosamide. This medication could be contributing to his balance difficulties. Decreasing lacosamide to 150 mg in the morning and 200 mg at night. He will continue on other AEDs unchanged. He will follow up in 6 weeks with Dr Juan Rosales as scheduled. Blood work to be done prior to follow up. Cerebral palsy ([address])  Stable although caretaker reports some worsening of balance recently. Decreasing lacosamide may improve balance. Resides in group home with 24 hour supervision. Subjective:  Macario Torres is a 66 y.o. male with cerebral palsy, generalized epilepsy, and behavioral disorder, who is returning to Neurology office for follow up of his seizures. He was last seen int he office on 7/7/2023. At that time, there were no breakthrough convulsive seizures or myoclonic seizures since starting zonisamide at prior visit. He was on Depakote  mg in the morning and 100 mg at night (BRAND ONLY), lacosamide 200 mg twice per day, zonisamide 300 mg at night, and levetiracetam 1500 mg twice per day. No changes were made at that time but noted that if he continued to do well could consider decreasing lacosamide slowly. Recommended 6 week follow up with AP and 6 weeks after with Dr Juan Rosales. Since his last visit, his caretaker believes that his ambulation is worse. He has been stumbling a lot but they think this may be attention seeking.  He did recently urinate on the floor. There are concerns that this is behavioral. He has been seeing psychiatry. They had an appointment this morning and they did not make any changes. Caretaker reports that this was discussed with psychiatry. There have not been any witnessed seizures since his last visit. Caretaker denies any suspicious jerking or other activity concerning for seizures. He is tolerating current AED therapy. Current seizure medications:  - zonisamide 300 mg HS  - levetiracetam 1500 mg twice per day  - lacosamide 200 mg twice per day  - gabapentin 300 mg three times per day  - Depakote  mg in the morning and 1000 mg at night (BRAND ONLY)  - clonazepam 0.25 mg PRN  Other medications as per Epic. Latest Reference Range & Units 01/20/23 08:06 05/16/23 11:38 05/22/23 09:21 05/22/23 10:30 07/03/23 11:33   LEVETIRACETA (KEPPRA) 10.0 - 40.0 ug/mL 37.1  <2.0 (L)  48.4 (H)   Lacosamide 5.0 - 10.0 ug/mL 9.4   12.9 (H) 7.6   VALPROIC ACID LEVEL, TOTAL  Rpt Rpt ! Rpt  Rpt ! VALPROIC ACID TOTAL 50 - 100 ug/mL 92 40 (L) 100  111 (H)       Prior Seizure Medications: zonisamide (stopped due to thrombocytopenia in the past - most recent platelet ct 899 on 4/0/10)    His history was also obtained from his caretaker, who was present at today's visit. I reviewed prior neurology notes, most recent labs, as documented in Epic/[facility], and summarized above. Objective:    Blood pressure 130/82, pulse 89, temperature 98 °F (36.7 °C), temperature source Temporal, height 6' (1.829 m), weight 72.4 kg (159 lb 9.6 oz), SpO2 95 %. Physical Exam  No apparent distress. Appears well nourished. Mood appropriate for situation     Neurologic Exam  Mental status- alert and oriented to person, place, and time. Speech appropriate for conversation. Good attention and knowledge.      Cranial Nerves- PERRL, VFFTC, EOMS normal, facial sensation and muscles symmetric, hearing intact bilaterally to finger rubs, tongue midline, palate rise symmetrical, shoulder shrug symmetrical.    Motor- No pronator drift. Appropriate strength. Moves all extremities freely. No tremor. Sensory-  Intact distally in all extremities to light touch. DTRs-     Gait- normal casual gait. Normal tandem gait. Negative rhomberg. Coordination- FNF intact. ROS:  Review of Systems   Constitutional: Negative. HENT: Negative. Eyes: Negative. Respiratory: Negative. Cardiovascular: Negative. Gastrointestinal: Negative. Endocrine: Negative. Genitourinary: Negative. Musculoskeletal: Positive for gait problem (stumbling when walking more frequently ). Skin: Negative. Allergic/Immunologic: Negative. Hematological: Negative. Psychiatric/Behavioral: Negative. All other systems reviewed and are negative.       ROS obtained by MA and reviewed by myself. This note may have been created using voice recognition software. There may be unintentional errors such as grammatical errors, spelling errors, or pronoun errors.

## 2023-09-28 NOTE — PROGRESS NOTES
Patient ID: Dorina Brothers is a 66 y.o. male. Assessment/Plan:    No problem-specific Assessment & Plan notes found for this encounter. {Assess/PlanSmartLinks:17347}       Subjective:    HPI    {[facility] HPI texts:28702}    {Common ambulatory SmartLinks:20422}         Objective: There were no vitals taken for this visit. Physical Exam    Neurological Exam      ROS:    Review of Systems   Constitutional: Negative. HENT: Negative. Eyes: Negative. Respiratory: Negative. Cardiovascular: Negative. Gastrointestinal: Negative. Endocrine: Negative. Genitourinary: Negative. Musculoskeletal: Positive for gait problem (stumbling when walking more frequently ). Skin: Negative. Allergic/Immunologic: Negative. Hematological: Negative. Psychiatric/Behavioral: Negative. All other systems reviewed and are negative.

## 2023-10-20 ENCOUNTER — APPOINTMENT (EMERGENCY)
Dept: RADIOLOGY | Facility: HOSPITAL | Age: 58
End: 2023-10-20
Payer: MEDICARE

## 2023-10-20 ENCOUNTER — HOSPITAL ENCOUNTER (EMERGENCY)
Facility: HOSPITAL | Age: 58
Discharge: HOME | End: 2023-10-20
Attending: EMERGENCY MEDICINE
Payer: MEDICARE

## 2023-10-20 VITALS
HEART RATE: 75 BPM | OXYGEN SATURATION: 98 % | RESPIRATION RATE: 16 BRPM | TEMPERATURE: 97 F | DIASTOLIC BLOOD PRESSURE: 72 MMHG | SYSTOLIC BLOOD PRESSURE: 138 MMHG

## 2023-10-20 DIAGNOSIS — S62.641A NONDISPLACED FRACTURE OF PROXIMAL PHALANX OF LEFT INDEX FINGER, INITIAL ENCOUNTER FOR CLOSED FRACTURE: Primary | ICD-10-CM

## 2023-10-20 PROCEDURE — 99283 EMERGENCY DEPT VISIT LOW MDM: CPT

## 2023-10-20 PROCEDURE — 99284 EMERGENCY DEPT VISIT MOD MDM: CPT | Performed by: EMERGENCY MEDICINE

## 2023-10-20 PROCEDURE — 29130 APPL FINGER SPLINT STATIC: CPT | Performed by: EMERGENCY MEDICINE

## 2023-10-20 PROCEDURE — 73130 X-RAY EXAM OF HAND: CPT

## 2023-10-20 RX ORDER — ACETAMINOPHEN 325 MG/1
975 TABLET ORAL ONCE
Status: COMPLETED | OUTPATIENT
Start: 2023-10-20 | End: 2023-10-20

## 2023-10-20 RX ADMIN — ACETAMINOPHEN 975 MG: 325 TABLET ORAL at 13:16

## 2023-10-20 RX ADMIN — IBUPROFEN 600 MG: 200 TABLET, FILM COATED ORAL at 13:15

## 2023-10-20 NOTE — ED PROVIDER NOTES
History  Chief Complaint   Patient presents with    Hand Swelling     Left hand swelling. Per caregiver patient cut left hand yesterday from a car door that he has been picking at. Patient is a 17-year-old male with a history of cerebral palsy, intellectual delay, who presents for evaluation of left hand pain/swelling. According to the caregiver, the patient fell yesterday falling on his left hand. He had a superficial wound to the left hand which she has been picking at. She says that there is increasing swelling and bruising in the area today. The patient is not able to provide much of the history. He does have some ecchymosis to the base of the left index finger and a superficial abrasion. There are no cellulitic changes or drainage from the wound. Prior to Admission Medications   Prescriptions Last Dose Informant Patient Reported? Taking? Blood Glucose Monitoring Suppl (Anup Webster) w/Device [id number] [facility], Outside Facility (Specify) No No   Sig: by Does not apply route 3 (three) times a day TEST BLOOD SUGARS THREE TIMES   Patient not taking: Reported on 7/7/2023   Depakote 500 MG DR tablet  Care Giver, Outside Facility (Specify) No No   Sig: GIVE 1 TABLET BY MOUTH EVERY MORNING **BRAND NAME ONLY** ~ GIVE 2 TABLETS BY MOUTH AT BEDTIME (=1000MG) FOR SEIZURE DISORDER DR. LIVE JONES   Depakote  MG 24 hr tablet  Care Giver, Outside Facility (Specify) No No   Sig: Take 1 tablet (250 mg total) by mouth daily Take in the morning with one 500 mg tablet (total morning dose of 750 mg).  BRAND ONLY   Disposable Gloves (Safe-Sense Glove-Blk-Nitrl-L) MISC  Outside Facility (Specify) No No   Sig: Use 1 each 3 (three) times a day as needed (care taker assisting with soiled briefs)   Patient not taking: Reported on 9/28/2023   Humidifiers (Cool Mist Humidifier 1 gallon) MISC  Care Giver No No   Sig: Use daily at bedtime   Patient not taking: Reported on 1/9/2023   Incontinence Supply Disposable (Briefs Overnight Medium) [facility]  Outside Facility (Specify) No No   Sig: Use in the morning   Patient not taking: Reported on 9/28/2023   Insulin Pen Needle (PEN NEEDLES) 31G X 5 MM [address] [place] [address], Outside Facility (Specify) No No   Sig: by Does not apply route daily   Patient not taking: Reported on 6/14/2023   Multiple Vitamin (Daily-Reina) [address], Outside Facility (Specify) No No   Sig: Take 1 tablet by mouth daily Take at [address] [facility], Outside Facility (Specify) No No   Sig: by Does not apply route daily TEST BLOOD SUGARS THREE TIMES DAILY   Patient not taking: Reported on 7/7/2023   QUEtiapine (SEROquel XR) 200 mg 24 hr tablet  Outside Facility (Specify) No No   Sig: TAKE 1 TABLET BY MOUTH TWICE A DAY (8AM,2PM)   QUEtiapine (SEROquel XR) 400 mg 24 hr tablet  Care Giver, Outside Facility (Specify) No No   Sig: TAKE 1 TABLET BY MOUTH AT BEDTIME (8PM) (DX: ANTIPSYCHOTIC)   benztropine (COGENTIN) 0.5 mg tablet  Care Giver, Outside Facility (Specify) No No   Sig: Take 2 tablets (1 mg total) by mouth 2 (two) times a day   Patient taking differently: Take 0.5 mg by mouth 2 (two) times a day   calcium carbonate (OYSTER SHELL,OSCAL) 500 mg  Care Giver, Outside Facility (Specify) No No   Sig: Take 1 tablet by mouth 3 (three) times a day   cholecalciferol (VITAMIN D3) 1,000 units tablet   No No   Sig: GIVE 1 TABLET BY MOUTH DAILY FOR VITAMIN D DEFICIENCY DR. Angie AVERY   clonazePAM (KlonoPIN) 0.25 MG disintegrating tablet  Care Giver, Outside Facility (Specify) No No   Sig: Take 1 tablet (0.25 mg total) by mouth as needed for seizures (clusters of myoclonic jerking (more than 2 myoclonic jerks in a day)) for up to 1 dose   docusate sodium (COLACE) 100 mg capsule  Outside Facility (Specify) No No   Sig: GIVE 1 CAPSULE BY MOUTH TWICE DAILY FOR STOOL SOFTNER DR. RADU AVEYR   gabapentin (NEURONTIN) 300 mg capsule  Care Giver, Outside Facility (Specify) Yes No   Sig: Take 300 mg by mouth 3 (three) times a day   hydrOXYzine pamoate (VISTARIL) 25 mg capsule  Outside Facility (Specify) No No   Sig: Give 1 capsule PO in the AM and around 2PM for anxiety   ibuprofen (MOTRIN) 600 mg tablet  Care Giver, Outside Facility (Specify) Yes No   Sig: Take 600 mg by mouth every 6 (six) hours as needed for mild pain   lacosamide (VIMPAT) 150 mg tablet   No No   Sig: Give one tablet by mouth in the morning. lacosamide (VIMPAT) 200 mg tablet   No No   Sig: GIVE 1 TABLET BY MOUTH AT BEDTIME   levETIRAcetam (KEPPRA) 750 mg tablet  Care Giver, Outside Facility (Specify) No No   Sig: GIVE 2 TABLETS BY MOUTH TWICE DAILY (=1500MG) FOR SEIZURE DISORDER DR. LIVE JONES   levothyroxine 150 mcg tablet  Outside Facility (Specify) No No   Sig: Take 1 tablet (150 mcg total) by mouth daily   lisinopril (ZESTRIL) 10 mg tablet   No No   Sig: GIVE 1 TABLET BY MOUTH DAILY FOR HYPERTENSION DR. RADU AVERY   loratadine (CLARITIN) 10 mg tablet  Care Giver, Outside Facility (Specify) Yes No   Sig: Take 10 mg by mouth daily   metFORMIN (GLUCOPHAGE) 1000 MG tablet   No No   Sig: Take 1 tablet (1,000 mg total) by mouth 2 (two) times a day with meals   simvastatin (ZOCOR) 40 mg tablet   No No   Sig: GIVE 1 TABLET BY MOUTH DAILY FOR HIGH CHOLESTEROL DR. DENIA AVERY   temazepam (RESTORIL) 15 mg capsule  Care Giver, Outside Facility (Specify) No No   Sig: Take 1 capsule (15 mg total) by mouth daily at bedtime as needed for sleep   vitamin B-12 (VITAMIN B-12) 1,000 mcg tablet  Outside Facility (Specify) No No   Sig: Take 1 tablet (1,000 mcg total) by mouth daily   Patient taking differently: Take 1,000 mcg by mouth every other day   zonisamide (ZONEGRAN) 100 mg capsule  Care Giver, Outside Facility (Specify) No No   Sig: Take 100 mg nightly for 1 wk, then take 200 mg nightly for 1 wk, then take 300 mg nightly.       Facility-Administered Medications: None       Past Medical History:   Diagnosis Date    ADRIANA (acute kidney injury) ([address] [facility]) 9/24/2019    Bacteremia due to Gram-positive bacteria 9/7/2021    Buttock wound, left, subsequent encounter 11/5/2021    COVID-19     CP (cerebral palsy) ([address])     Depression     Diabetes ([address])     Disease of thyroid gland     Gait disorder     Gluteal abscess 9/6/2021    Hyperlipidemia     Hypertension     Kidney failure     Kidney stones     Moderate protein-calorie malnutrition ([address]) 12/22/2021    Osteoporosis     Pressure injury of left buttock, stage 4 ([address]) 3/9/2022    Psychiatric disorder     Scoliosis     Seizures ([address])     Sepsis ([address]) 9/25/2019    Thyroid disease     UTI (urinary tract infection)        Past Surgical History:   Procedure Laterality Date    APPENDECTOMY      IR PICC PLACEMENT SINGLE LUMEN  9/13/2021    IR PICC PLACEMENT SINGLE LUMEN  9/16/2021       History reviewed. No pertinent family history. I have reviewed and agree with the history as documented. E-Cigarette/Vaping    E-Cigarette Use Never User      E-Cigarette/Vaping Substances    Nicotine No     THC No     CBD No     Flavoring No     Other No     Unknown No      Social History     Tobacco Use    Smoking status: Never    Smokeless tobacco: Never   Vaping Use    Vaping Use: Never used   Substance Use Topics    Alcohol use: Never    Drug use: No       Review of Systems   Constitutional:  Negative for chills, fever and unexpected weight change. HENT:  Negative for congestion, sore throat and trouble swallowing. Eyes:  Negative for pain, discharge and itching. Respiratory:  Negative for cough, chest tightness, shortness of breath and wheezing. Cardiovascular:  Negative for chest pain, palpitations and leg swelling. Gastrointestinal:  Negative for abdominal pain, blood in stool, diarrhea, nausea and vomiting. Endocrine: Negative for polyuria. Genitourinary:  Negative for difficulty urinating, dysuria, frequency and hematuria. Musculoskeletal:  Positive for arthralgias (left hand). Negative for back pain.    Skin: Positive for wound (lef thand). Neurological:  Negative for dizziness, syncope, weakness, light-headedness and headaches. Physical Exam  Physical Exam  Vitals and nursing note reviewed. Constitutional:       General: He is not in acute distress. Appearance: He is well-developed. HENT:      Head: Normocephalic and atraumatic. Right Ear: External ear normal.      Left Ear: External ear normal.   Eyes:      Conjunctiva/sclera: Conjunctivae normal.      Pupils: Pupils are equal, round, and reactive to light. Cardiovascular:      Rate and Rhythm: Normal rate and regular rhythm. Heart sounds: Normal heart sounds. No murmur heard. No friction rub. No gallop. Pulmonary:      Effort: Pulmonary effort is normal. No respiratory distress. Breath sounds: Normal breath sounds. No wheezing or rales. Abdominal:      General: Bowel sounds are normal. There is no distension. Palpations: Abdomen is soft. Tenderness: There is no abdominal tenderness. There is no guarding. Musculoskeletal:         General: Swelling and tenderness (left 4th digit) present. No deformity. Normal range of motion. Cervical back: Normal range of motion. Lymphadenopathy:      Cervical: No cervical adenopathy. Skin:     General: Skin is warm and dry. Neurological:      General: No focal deficit present. Mental Status: He is alert and oriented to person, place, and time. Mental status is at baseline. Cranial Nerves: No cranial nerve deficit. Sensory: No sensory deficit. Motor: No weakness or abnormal muscle tone.    Psychiatric:         Behavior: Behavior normal.         Vital Signs  ED Triage Vitals [10/20/23 1252]   Temperature Pulse Respirations Blood Pressure SpO2   (!) 97 °F (36.1 °C) 75 16 138/72 98 %      Temp Source Heart Rate Source Patient Position - Orthostatic VS BP Location FiO2 (%)   Oral Monitor Sitting Right arm --      Pain Score       10 - Worst Possible Pain Vitals:    10/20/23 1252   BP: 138/72   Pulse: 75   Patient Position - Orthostatic VS: Sitting         Visual Acuity      ED Medications  Medications   acetaminophen (TYLENOL) tablet 975 mg (975 mg Oral Given 10/20/23 1316)   ibuprofen (MOTRIN) tablet 600 mg (600 mg Oral Given 10/20/23 1315)       Diagnostic Studies  Results Reviewed       None                   XR hand 3+ views LEFT   ED Interpretation by Garrett Bee DO (10/20 1326)   Minimally displaced fracture of proximal first phalanx      Final Result by Jordan Garcia MD (10/20 1342)      Acute angulated index finger proximal phalanx fracture. This report is concordant with CHINO CHAN's preliminary interpretation that is documented in the electronic medical record (EPIC). Workstation performed: [id number]                    Procedures  Splint application    Date/Time: 10/20/2023 3:31 PM    Performed by: Garrett Bee DO  Authorized by: Garrett Bee DO  [place] Protocol:  Consent: Verbal consent obtained. Consent given by: patient  Required items: required blood products, implants, devices, and special equipment available  Patient identity confirmed: arm band    Pre-procedure details:     Sensation:  Normal  Procedure details:     Laterality:  Left    Location:  Finger    Finger:  L index finger    Strapping: no      Splint type:  Finger splint, static    Supplies:  Elastic bandage and Ortho-Glass  Post-procedure details:     Pain:  Unchanged    Sensation:  Normal    Patient tolerance of procedure: Tolerated well, no immediate complications           ED Course                               SBIRT 22yo+      Flowsheet Row Most Recent Value   Initial Alcohol Screen: US AUDIT-C     1. How often do you have a drink containing alcohol? 0 Filed at: 10/20/2023 1318   2. How many drinks containing alcohol do you have on a typical day you are drinking? 0 Filed at: 10/20/2023 1318   3a. Male UNDER 65:  How often do you have five or more drinks on one occasion? 0 Filed at: 10/20/2023 1318   3b. FEMALE Any Age, or MALE 65+: How often do you have 4 or more drinks on one occassion? 0 Filed at: 10/20/2023 1318   Audit-C Score 0 Filed at: 10/20/2023 1318   ADRIAN: How many times in the past year have you. .. Used an illegal drug or used a prescription medication for non-medical reasons? Never Filed at: 10/20/2023 1318                      Medical Decision Making  19-year-old male presenting for left index finger swelling after a fall yesterday. Has a superficial cut there. Tenderness and ecchymosis. Will obtain x-ray to rule out fracture. X-ray shows mildly displaced proximal first phalanx fracture. Patient was placed in a splint with Ortho-Glass. Given orthopedic referral.    Problems Addressed:  Nondisplaced fracture of proximal phalanx of left index finger, initial encounter for closed fracture: acute illness or injury    Amount and/or Complexity of Data Reviewed  Radiology: ordered and independent interpretation performed. Risk  OTC drugs. Disposition  Final diagnoses:   Nondisplaced fracture of proximal phalanx of left index finger, initial encounter for closed fracture     Time reflects when diagnosis was documented in both MDM as applicable and the Disposition within this note       Time User Action Codes Description Comment    10/20/2023  1:27 PM Emily Humphries Nondisplaced fracture of proximal phalanx of left index finger, initial encounter for closed fracture           ED Disposition       ED Disposition   Discharge    Condition   Stable    Date/Time   Fri Oct 20, 2023 [address] discharge to home/self care.                    Follow-up Information       Follow up With Specialties Details Why Contact Info Additional Information    [facility] Schedule an appointment as soon as possible for a visit  For follow up of finger fracture [address] [place] [address] [facility], [address], [place], [place], [address]            Discharge Medication List as of 10/20/2023  1:45 PM        CONTINUE these medications which have NOT CHANGED    Details   benztropine (COGENTIN) 0.5 mg tablet Take 2 tablets (1 mg total) by mouth 2 (two) times a day, Starting Tue 6/27/2023, Normal      Blood Glucose Monitoring Suppl (Jennifer Areas) w/Device KIT by Does not apply route 3 (three) times a day TEST BLOOD SUGARS THREE TIMES, Starting Fri 7/31/2020, Normal      calcium carbonate (OYSTER SHELL,OSCAL) 500 mg Take 1 tablet by mouth 3 (three) times a day, Starting Wed 3/22/2023, Normal      cholecalciferol (VITAMIN D3) 1,000 units tablet GIVE 1 TABLET BY MOUTH DAILY FOR VITAMIN D DEFICIENCY DR. Darlyn AVERY, Normal      clonazePAM (KlonoPIN) 0.25 MG disintegrating tablet Take 1 tablet (0.25 mg total) by mouth as needed for seizures (clusters of myoclonic jerking (more than 2 myoclonic jerks in a day)) for up to 1 dose, Starting Fri 5/19/2023, Normal      Depakote 500 MG DR tablet GIVE 1 TABLET BY MOUTH EVERY MORNING **BRAND NAME ONLY** ~ GIVE 2 TABLETS BY MOUTH AT BEDTIME (=1000MG) FOR SEIZURE DISORDER DR. LIVE JONES, Normal      Depakote  MG 24 hr tablet Take 1 tablet (250 mg total) by mouth daily Take in the morning with one 500 mg tablet (total morning dose of 750 mg). BRAND ONLY, Starting Wed 5/25/2022, Normal      Disposable Gloves (Safe-Sense Glove-Blk-Nitrl-L) MISC Use 1 each 3 (three) times a day as needed (care taker assisting with soiled briefs), Starting Wed 7/26/2023, Normal      docusate sodium (COLACE) 100 mg capsule GIVE 1 CAPSULE BY MOUTH TWICE DAILY FOR STOOL SOFTNER DR. RADU AVERY, Normal      gabapentin (NEURONTIN) 300 mg capsule Take 300 mg by mouth 3 (three) times a day, Starting Wed 3/30/2022, Historical Med Humidifiers (Cool Mist Humidifier 1 gallon) MISC Use daily at bedtime, Starting Tue 11/10/2020, Until Mon 3/21/2022, Normal      hydrOXYzine pamoate (VISTARIL) 25 mg capsule Give 1 capsule PO in the AM and around 2PM for anxiety, Normal      ibuprofen (MOTRIN) 600 mg tablet Take 600 mg by mouth every 6 (six) hours as needed for mild pain, Historical Med      Incontinence Supply Disposable (Briefs Overnight Medium) MISC Use in the morning, Starting Wed 7/26/2023, Normal      Insulin Pen Needle (PEN NEEDLES) 31G X 5 MM MISC by Does not apply route daily, Starting Mon 7/13/2020, Normal      !! lacosamide (VIMPAT) 150 mg tablet Give one tablet by mouth in the morning., Normal      !! lacosamide (VIMPAT) 200 mg tablet GIVE 1 TABLET BY MOUTH AT BEDTIME, Normal      levETIRAcetam (KEPPRA) 750 mg tablet GIVE 2 TABLETS BY MOUTH TWICE DAILY (=1500MG) FOR SEIZURE DISORDER DR. LIVE JONES, Normal      levothyroxine 150 mcg tablet Take 1 tablet (150 mcg total) by mouth daily, Starting Wed 7/26/2023, Normal      lisinopril (ZESTRIL) 10 mg tablet GIVE 1 TABLET BY MOUTH DAILY FOR HYPERTENSION DR. RADU AVERY, Normal      loratadine (CLARITIN) 10 mg tablet Take 10 mg by mouth daily, Historical Med      metFORMIN (GLUCOPHAGE) 1000 MG tablet Take 1 tablet (1,000 mg total) by mouth 2 (two) times a day with meals, Starting Mon 10/9/2023, Normal      Multiple Vitamin (Daily-Reina) TABS Take 1 tablet by mouth daily Take at 8 AM, Starting Mon 4/3/2023, Normal      OneTouch Delica Lancets 15T MISC by Does not apply route daily TEST BLOOD SUGARS THREE TIMES DAILY, Starting Fri 7/31/2020, Normal      !! QUEtiapine (SEROquel XR) 200 mg 24 hr tablet TAKE 1 TABLET BY MOUTH TWICE A DAY (8AM,2PM), Normal      !! QUEtiapine (SEROquel XR) 400 mg 24 hr tablet TAKE 1 TABLET BY MOUTH AT BEDTIME (8PM) (DX: ANTIPSYCHOTIC), Normal      simvastatin (ZOCOR) 40 mg tablet GIVE 1 TABLET BY MOUTH DAILY FOR HIGH CHOLESTEROL DR. DENIA AVERY, Normal temazepam (RESTORIL) 15 mg capsule Take 1 capsule (15 mg total) by mouth daily at bedtime as needed for sleep, Starting Mon 3/28/2022, Normal      vitamin B-12 (VITAMIN B-12) 1,000 mcg tablet Take 1 tablet (1,000 mcg total) by mouth daily, Starting Wed 7/26/2023, Normal      zonisamide (ZONEGRAN) 100 mg capsule Take 100 mg nightly for 1 wk, then take 200 mg nightly for 1 wk, then take 300 mg nightly., Normal       !! - Potential duplicate medications found. Please discuss with provider.               PDMP Review         Value Time User    PDMP Reviewed  Yes 9/1/2023  2:41 PM Vira Sanchez, [address] [place] [address]            ED Provider  Electronically Signed by             Omer Macedo DO  10/20/23 0507

## 2023-10-20 NOTE — DISCHARGE INSTRUCTIONS
It is important that you keep the splint in place until you are seen by the orthopedic surgeon. Continue to take Tylenol and Motrin as needed for the pain.   Apply ice to the area as needed for pain and swelling

## 2023-10-23 ENCOUNTER — OFFICE VISIT (OUTPATIENT)
Dept: OBGYN CLINIC | Facility: CLINIC | Age: 58
End: 2023-10-23
Payer: MEDICARE

## 2023-10-23 VITALS
BODY MASS INDEX: 21.4 KG/M2 | DIASTOLIC BLOOD PRESSURE: 75 MMHG | HEIGHT: 72 IN | TEMPERATURE: 97.5 F | SYSTOLIC BLOOD PRESSURE: 117 MMHG | HEART RATE: 78 BPM | WEIGHT: 158 LBS

## 2023-10-23 DIAGNOSIS — S62.641A NONDISPLACED FRACTURE OF PROXIMAL PHALANX OF LEFT INDEX FINGER, INITIAL ENCOUNTER FOR CLOSED FRACTURE: ICD-10-CM

## 2023-10-23 PROCEDURE — 99214 OFFICE O/P EST MOD 30 MIN: CPT | Performed by: FAMILY MEDICINE

## 2023-10-23 NOTE — PROGRESS NOTES
[facility] [address] [address] [phone]      Assessment:  1. Nondisplaced fracture of proximal phalanx of left index finger, initial encounter for closed fracture  -     Ambulatory Referral to Orthopedic Surgery        Plan:  Patient Instructions   F/u 1 wk  XR- L hand 1 wk  Buddy taping. Elevation  Icing/OTC pain meds as needed. Return in about 1 week (around 10/30/2023) for Recheck. Chief Complaint:  Chief Complaint   Patient presents with    Left Index Finger - Fracture       Subjective:   HPI    Patient ID: Thai Lane is a 66 y.o. male     Here with caregiver for L 2nd prox phalanx fx  Hx of CP. He fell down the driveway going to the car and landed on his L hand  Seen in ER. XR done. Wearing split  Having pain    Review of Systems   Constitutional:  Negative for fatigue and fever. Respiratory:  Negative for shortness of breath. Cardiovascular:  Negative for chest pain. Gastrointestinal:  Negative for abdominal pain and nausea. Genitourinary:  Negative for dysuria. Musculoskeletal:  Positive for arthralgias. Skin:  Negative for rash and wound. Neurological:  Negative for weakness and headaches. Objective:  Vitals:  /75 (BP Location: Right arm, Patient Position: Sitting, Cuff Size: Standard)   Pulse 78   Temp 97.5 °F (36.4 °C) (Tympanic)   Ht 6' (1.829 m)   Wt 71.7 kg (158 lb)   BMI 21.43 kg/m²     The following portions of the patient's history were reviewed and updated as appropriate: allergies, current medications, past family history, past medical history, past social history, past surgical history, and problem list.    Physical exam:  Physical Exam  Ortho Exam    I have personally reviewed pertinent films in PACS and my interpretation is XR-  L hand- 2nd prox phalanx mildly displaced fx. Michael Canela MD

## 2023-10-30 ENCOUNTER — OFFICE VISIT (OUTPATIENT)
Dept: OBGYN CLINIC | Facility: CLINIC | Age: 58
End: 2023-10-30
Payer: MEDICARE

## 2023-10-30 ENCOUNTER — APPOINTMENT (OUTPATIENT)
Dept: RADIOLOGY | Facility: CLINIC | Age: 58
End: 2023-10-30
Payer: MEDICARE

## 2023-10-30 VITALS
TEMPERATURE: 98.2 F | WEIGHT: 156.8 LBS | HEIGHT: 72 IN | BODY MASS INDEX: 21.24 KG/M2 | HEART RATE: 86 BPM | SYSTOLIC BLOOD PRESSURE: 136 MMHG | DIASTOLIC BLOOD PRESSURE: 70 MMHG

## 2023-10-30 DIAGNOSIS — S62.641D CLOSED NONDISPLACED FRACTURE OF PROXIMAL PHALANX OF LEFT INDEX FINGER WITH ROUTINE HEALING, SUBSEQUENT ENCOUNTER: Primary | ICD-10-CM

## 2023-10-30 DIAGNOSIS — S62.641A NONDISPLACED FRACTURE OF PROXIMAL PHALANX OF LEFT INDEX FINGER, INITIAL ENCOUNTER FOR CLOSED FRACTURE: ICD-10-CM

## 2023-10-30 PROCEDURE — 73130 X-RAY EXAM OF HAND: CPT

## 2023-10-30 PROCEDURE — 99214 OFFICE O/P EST MOD 30 MIN: CPT | Performed by: FAMILY MEDICINE

## 2023-10-30 NOTE — PROGRESS NOTES
[facility] [address] [id number] [phone]      Assessment:  1. Closed nondisplaced fracture of proximal phalanx of left index finger with routine healing, subsequent encounter  -     XR hand 3+ vw left; Future; Expected date: 10/30/2023  -     Brace        Plan:  Patient Instructions   F/u 1 wk  XR- L hand 1 wk  Radial gutter brace at all times. Elevation as needed. Return in about 1 week (around 11/6/2023) for Recheck. Chief Complaint:  Chief Complaint   Patient presents with    Left Hand - Follow-up       Subjective:   HPI    Patient ID: Jerrica Rai is a 66 y.o. male     Here with caregiver for f/u  L 2nd prox phalanx fx  Having some pain  Wearing buddy brace- but taking off at times/ caregiver puts it back on  No pain meds    Review of Systems   Constitutional:  Negative for fatigue and fever. Respiratory:  Negative for shortness of breath. Cardiovascular:  Negative for chest pain. Gastrointestinal:  Negative for abdominal pain and nausea. Genitourinary:  Negative for dysuria. Musculoskeletal:  Positive for arthralgias. Skin:  Negative for rash and wound. Neurological:  Negative for weakness and headaches. Objective:  Vitals:  /70 (BP Location: Left arm, Patient Position: Sitting, Cuff Size: Standard)   Pulse 86   Temp 98.2 °F (36.8 °C) (Tympanic)   Ht 6' (1.829 m)   Wt 71.1 kg (156 lb 12.8 oz)   BMI 21.27 kg/m²     The following portions of the patient's history were reviewed and updated as appropriate: allergies, current medications, past family history, past medical history, past social history, past surgical history, and problem list.    Physical exam:  Physical Exam  Constitutional:       Appearance: Normal appearance. He is normal weight. Eyes:      Extraocular Movements: Extraocular movements intact.    Pulmonary:      Effort: Pulmonary effort is normal.   Musculoskeletal: General: Tenderness present. Cervical back: Normal range of motion. Comments: L 2nd prox phalanx swollen, TTP  NVI   Skin:     General: Skin is warm and dry. Neurological:      General: No focal deficit present. Mental Status: He is alert and oriented to person, place, and time. Mental status is at baseline. Psychiatric:         Mood and Affect: Mood normal.         Behavior: Behavior normal.         Thought Content: Thought content normal.         Judgment: Judgment normal.       Ortho Exam    I have personally reviewed pertinent films in PACS and my interpretation is XR_  L hand- mild worsening angulation/displacement of 2nd prox phalanx fx.       Brenda Muse MD

## 2023-11-01 ENCOUNTER — VBI (OUTPATIENT)
Dept: ADMINISTRATIVE | Facility: OTHER | Age: 58
End: 2023-11-01

## 2023-11-01 LAB
LEFT EYE DIABETIC RETINOPATHY: NORMAL
RIGHT EYE DIABETIC RETINOPATHY: NORMAL

## 2023-11-03 ENCOUNTER — APPOINTMENT (OUTPATIENT)
Dept: LAB | Facility: CLINIC | Age: 58
End: 2023-11-03
Payer: MEDICARE

## 2023-11-03 DIAGNOSIS — G40.309 GENERALIZED CONVULSIVE EPILEPSY (HCC): ICD-10-CM

## 2023-11-03 LAB
ALBUMIN SERPL BCP-MCNC: 4.3 G/DL (ref 3.5–5)
ALP SERPL-CCNC: 58 U/L (ref 34–104)
ALT SERPL W P-5'-P-CCNC: 44 U/L (ref 7–52)
ANION GAP SERPL CALCULATED.3IONS-SCNC: 9 MMOL/L
AST SERPL W P-5'-P-CCNC: 38 U/L (ref 13–39)
BASOPHILS # BLD AUTO: 0.03 THOUSANDS/ÂΜL (ref 0–0.1)
BASOPHILS NFR BLD AUTO: 0 % (ref 0–1)
BILIRUB SERPL-MCNC: 0.27 MG/DL (ref 0.2–1)
BUN SERPL-MCNC: 15 MG/DL (ref 5–25)
CALCIUM SERPL-MCNC: 9.9 MG/DL (ref 8.4–10.2)
CHLORIDE SERPL-SCNC: 95 MMOL/L (ref 96–108)
CO2 SERPL-SCNC: 31 MMOL/L (ref 21–32)
CREAT SERPL-MCNC: 0.58 MG/DL (ref 0.6–1.3)
EOSINOPHIL # BLD AUTO: 0.12 THOUSAND/ÂΜL (ref 0–0.61)
EOSINOPHIL NFR BLD AUTO: 2 % (ref 0–6)
ERYTHROCYTE [DISTWIDTH] IN BLOOD BY AUTOMATED COUNT: 15.5 % (ref 11.6–15.1)
GFR SERPL CREATININE-BSD FRML MDRD: 113 ML/MIN/1.73SQ M
GLUCOSE P FAST SERPL-MCNC: 111 MG/DL (ref 65–99)
HCT VFR BLD AUTO: 39.7 % (ref 36.5–49.3)
HGB BLD-MCNC: 13.8 G/DL (ref 12–17)
IMM GRANULOCYTES # BLD AUTO: 0.01 THOUSAND/UL (ref 0–0.2)
IMM GRANULOCYTES NFR BLD AUTO: 0 % (ref 0–2)
LYMPHOCYTES # BLD AUTO: 1.77 THOUSANDS/ÂΜL (ref 0.6–4.47)
LYMPHOCYTES NFR BLD AUTO: 25 % (ref 14–44)
MCH RBC QN AUTO: 33.1 PG (ref 26.8–34.3)
MCHC RBC AUTO-ENTMCNC: 34.8 G/DL (ref 31.4–37.4)
MCV RBC AUTO: 95 FL (ref 82–98)
MONOCYTES # BLD AUTO: 0.87 THOUSAND/ÂΜL (ref 0.17–1.22)
MONOCYTES NFR BLD AUTO: 12 % (ref 4–12)
NEUTROPHILS # BLD AUTO: 4.37 THOUSANDS/ÂΜL (ref 1.85–7.62)
NEUTS SEG NFR BLD AUTO: 61 % (ref 43–75)
NRBC BLD AUTO-RTO: 0 /100 WBCS
PLATELET # BLD AUTO: 135 THOUSANDS/UL (ref 149–390)
PMV BLD AUTO: 12.6 FL (ref 8.9–12.7)
POTASSIUM SERPL-SCNC: 4.5 MMOL/L (ref 3.5–5.3)
PROT SERPL-MCNC: 7.1 G/DL (ref 6.4–8.4)
RBC # BLD AUTO: 4.17 MILLION/UL (ref 3.88–5.62)
SODIUM SERPL-SCNC: 135 MMOL/L (ref 135–147)
VALPROATE SERPL-MCNC: 79 UG/ML (ref 50–100)
WBC # BLD AUTO: 7.17 THOUSAND/UL (ref 4.31–10.16)

## 2023-11-03 PROCEDURE — 36415 COLL VENOUS BLD VENIPUNCTURE: CPT

## 2023-11-03 PROCEDURE — 80164 ASSAY DIPROPYLACETIC ACD TOT: CPT

## 2023-11-03 PROCEDURE — 80235 DRUG ASSAY LACOSAMIDE: CPT

## 2023-11-03 PROCEDURE — 80053 COMPREHEN METABOLIC PANEL: CPT

## 2023-11-03 PROCEDURE — 80203 DRUG SCREEN QUANT ZONISAMIDE: CPT

## 2023-11-03 PROCEDURE — 80177 DRUG SCRN QUAN LEVETIRACETAM: CPT

## 2023-11-03 PROCEDURE — 85025 COMPLETE CBC W/AUTO DIFF WBC: CPT

## 2023-11-06 ENCOUNTER — OFFICE VISIT (OUTPATIENT)
Dept: OBGYN CLINIC | Facility: CLINIC | Age: 58
End: 2023-11-06
Payer: MEDICARE

## 2023-11-06 ENCOUNTER — APPOINTMENT (OUTPATIENT)
Dept: RADIOLOGY | Facility: CLINIC | Age: 58
End: 2023-11-06
Payer: MEDICARE

## 2023-11-06 VITALS
WEIGHT: 161.6 LBS | DIASTOLIC BLOOD PRESSURE: 68 MMHG | HEART RATE: 70 BPM | TEMPERATURE: 97.6 F | BODY MASS INDEX: 21.89 KG/M2 | SYSTOLIC BLOOD PRESSURE: 133 MMHG | HEIGHT: 72 IN

## 2023-11-06 DIAGNOSIS — E55.9 VITAMIN D DEFICIENCY: ICD-10-CM

## 2023-11-06 DIAGNOSIS — I10 HYPERTENSION, UNSPECIFIED TYPE: ICD-10-CM

## 2023-11-06 DIAGNOSIS — E78.5 HYPERLIPIDEMIA, UNSPECIFIED HYPERLIPIDEMIA TYPE: ICD-10-CM

## 2023-11-06 DIAGNOSIS — G20.C PARKINSON'S PLUS SYNDROME: ICD-10-CM

## 2023-11-06 DIAGNOSIS — S62.641D CLOSED NONDISPLACED FRACTURE OF PROXIMAL PHALANX OF LEFT INDEX FINGER WITH ROUTINE HEALING, SUBSEQUENT ENCOUNTER: ICD-10-CM

## 2023-11-06 DIAGNOSIS — S62.641D CLOSED NONDISPLACED FRACTURE OF PROXIMAL PHALANX OF LEFT INDEX FINGER WITH ROUTINE HEALING, SUBSEQUENT ENCOUNTER: Primary | ICD-10-CM

## 2023-11-06 LAB
LACOSAMIDE SERPL-MCNC: 1.5 UG/ML (ref 5–10)
LEVETIRACETAM SERPL-MCNC: 43.2 UG/ML (ref 10–40)
ZONISAMIDE SERPL-MCNC: 10.8 UG/ML (ref 10–40)

## 2023-11-06 PROCEDURE — 99214 OFFICE O/P EST MOD 30 MIN: CPT | Performed by: FAMILY MEDICINE

## 2023-11-06 PROCEDURE — 73130 X-RAY EXAM OF HAND: CPT

## 2023-11-06 RX ORDER — SIMVASTATIN 40 MG
40 TABLET ORAL
Qty: 90 TABLET | Refills: 1 | Status: SHIPPED | OUTPATIENT
Start: 2023-11-06

## 2023-11-06 RX ORDER — LISINOPRIL 10 MG/1
10 TABLET ORAL DAILY
Qty: 90 TABLET | Refills: 1 | Status: SHIPPED | OUTPATIENT
Start: 2023-11-06

## 2023-11-06 RX ORDER — MELATONIN
1000 DAILY
Qty: 90 TABLET | Refills: 1 | Status: SHIPPED | OUTPATIENT
Start: 2023-11-06

## 2023-11-06 NOTE — PROGRESS NOTES
[facility] [address] [id number] [phone]      Assessment:  1. Closed nondisplaced fracture of proximal phalanx of left index finger with routine healing, subsequent encounter  -     XR hand 3+ vw left; Future; Expected date: 11/06/2023        Plan:  Patient Instructions   F/u 2 wks  XR L hand 2 wks  Continue wearing brace. Icing/heat/OTC pain meds as needed. Return in about 2 weeks (around 11/20/2023) for Recheck. Chief Complaint:  Chief Complaint   Patient presents with    Left Index Finger - Follow-up       Subjective:   HPI    Patient ID: Pat Becker is a 66 y.o. male     Here with caregiver for f/u  L 2nd prox phalanx fx  Having some pain  Wearing wrist brace- keep it on  No pain meds    Review of Systems   Constitutional:  Negative for fatigue and fever. Respiratory:  Negative for shortness of breath. Cardiovascular:  Negative for chest pain. Gastrointestinal:  Negative for abdominal pain and nausea. Genitourinary:  Negative for dysuria. Musculoskeletal:  Positive for arthralgias. Skin:  Negative for rash and wound. Neurological:  Negative for weakness and headaches. Objective:  Vitals:  /68 (BP Location: Left arm, Patient Position: Sitting, Cuff Size: Standard)   Pulse 70   Temp 97.6 °F (36.4 °C) (Tympanic)   Ht 6' (1.829 m)   Wt 73.3 kg (161 lb 9.6 oz)   BMI 21.92 kg/m²     The following portions of the patient's history were reviewed and updated as appropriate: allergies, current medications, past family history, past medical history, past social history, past surgical history, and problem list.    Physical exam:  Physical Exam  Constitutional:       Appearance: Normal appearance. He is normal weight. Eyes:      Extraocular Movements: Extraocular movements intact. Pulmonary:      Effort: Pulmonary effort is normal.   Musculoskeletal:         General: Tenderness present. Cervical back: Normal range of motion. Comments: L 2nd prox phalanx TTP NVI   Skin:     General: Skin is warm and dry. Neurological:      General: No focal deficit present. Mental Status: He is alert and oriented to person, place, and time. Mental status is at baseline. Psychiatric:         Mood and Affect: Mood normal.         Behavior: Behavior normal.         Thought Content: Thought content normal.         Judgment: Judgment normal.       Ortho Exam    I have personally reviewed pertinent films in PACS and my interpretation is XR L hand- healing displaced prox phalanx fx with no further displacement.       Marika French MD

## 2023-11-09 ENCOUNTER — TELEPHONE (OUTPATIENT)
Dept: NEUROLOGY | Facility: CLINIC | Age: 58
End: 2023-11-09

## 2023-11-09 NOTE — TELEPHONE ENCOUNTER
Called re: below X's 2 and with both attempts, the phone stops ringing without an opportunity to leave a VM. Will try at a later date.

## 2023-11-09 NOTE — TELEPHONE ENCOUNTER
----- Message from HUNTER Escobar sent at 11/8/2023  5:01 PM EST -----  See what dose of lacosamide he is taking or if there were missed doses.   ----- Message -----  From: Lab, Background User  Sent: 11/3/2023   7:44 PM EST  To: HUNTER Escobar

## 2023-11-10 NOTE — TELEPHONE ENCOUNTER
Spoke to caregiver 150mg in am, 200mg in pm. No missed doses. Medications taken after labs drawn. No signs of seizure, patient does continue with twitching or jerking type movements. He has a follow up appt with  this coming week.

## 2023-11-17 ENCOUNTER — OFFICE VISIT (OUTPATIENT)
Dept: NEUROLOGY | Facility: CLINIC | Age: 58
End: 2023-11-17
Payer: MEDICARE

## 2023-11-17 VITALS
DIASTOLIC BLOOD PRESSURE: 70 MMHG | HEIGHT: 72 IN | SYSTOLIC BLOOD PRESSURE: 122 MMHG | BODY MASS INDEX: 21.82 KG/M2 | WEIGHT: 161.1 LBS | TEMPERATURE: 98.3 F

## 2023-11-17 DIAGNOSIS — G40.309 GENERALIZED CONVULSIVE EPILEPSY (HCC): Chronic | ICD-10-CM

## 2023-11-17 PROCEDURE — 99214 OFFICE O/P EST MOD 30 MIN: CPT | Performed by: PSYCHIATRY & NEUROLOGY

## 2023-11-17 RX ORDER — ZONISAMIDE 100 MG/1
CAPSULE ORAL
Qty: 120 CAPSULE | Refills: 11 | Status: SHIPPED | OUTPATIENT
Start: 2023-11-17

## 2023-11-17 RX ORDER — LACOSAMIDE 150 MG/1
150 TABLET ORAL EVERY 12 HOURS SCHEDULED
Qty: 60 TABLET | Refills: 5 | Status: SHIPPED | OUTPATIENT
Start: 2023-11-17

## 2023-11-17 NOTE — PROGRESS NOTES
Patient ID: Tommy Post is a 66 y.o. male with  cerebral palsy, generalized epilepsy, and behavioral disorder, who is returning to Neurology office for follow up of his seizures. 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 He has had improvement in these with starting zonisamide in the past, and it does seem that he may have some difficulty with walking from his lacosamide. I would like to try to continue to adjust his medications by decreasing lacosamide further and increasing zonisamide since this has been helpful for his myoclonus. --He will increase and this might to be 400 mg nightly. Once he is on that dose for about 1.5 weeks, I will also have him decrease lacosamide to be 150 mg twice a day. His caregiver will keep track of any recurrence of seizures and for the frequency of his myoclonus. If possible in the future, we could try to fully wean off of lacosamide        He will Return in about 10 weeks (around 1/26/2024). Subjective:    HPI  Current seizure medications:  1. zonisamide 300 mg HS  2. levetiracetam 1500 mg twice per day  3. lacosamide 150 mg in the morning and 200 mg at night. 4. gabapentin 300 mg three times per day  5. Depakote  mg in the morning and 1000 mg at night (BRAND ONLY)  6. clonazepam 0.25 mg PRN  Other medications as per [facility]. Since his last visit, he decreased his Lacosamide slightly to his above dose. He hasn't had any clear seizures, but has been having myoclonus again. This is less severe than in the past, and has not caused him to fall. He has continued to have some issues with falling. His caregiver feels that he will at times intentionally fall to get attention.      Prior Seizure Medications: zonisamide (stopped due to thrombocytopenia in the past - most recent platelet ct 475 on 2/5/53)     His history was also obtained from his caregiver, who was present at today's visit. Objective:    Blood pressure 122/70, temperature 98.3 °F (36.8 °C), temperature source Temporal, height 6' (1.829 m), weight 73.1 kg (161 lb 1.6 oz). Physical Exam    Neurological Exam      ROS:    Review of Systems   Constitutional:  Negative for appetite change, fatigue and fever. HENT: Negative. Negative for hearing loss, tinnitus, trouble swallowing and voice change. Eyes: Negative. Negative for photophobia, pain and visual disturbance. Respiratory: Negative. Negative for shortness of breath. Cardiovascular: Negative. Negative for palpitations. Gastrointestinal: Negative. Negative for nausea and vomiting. Endocrine: Negative. Negative for cold intolerance. Genitourinary: Negative. Negative for dysuria, frequency and urgency. Musculoskeletal:  Negative for back pain, gait problem, myalgias and neck pain. Skin: Negative. Negative for rash. Allergic/Immunologic: Negative. Neurological: Negative. Negative for dizziness, tremors, seizures, syncope, facial asymmetry, speech difficulty, weakness, light-headedness, numbness and headaches. Continues to have body jerking   Hematological: Negative. Does not bruise/bleed easily. Psychiatric/Behavioral: Negative. Negative for confusion, hallucinations and sleep disturbance. All other systems reviewed and are negative. I personally reviewed the ROS that was entered by the medical assistant    Voice recognition software was used in the generation of this note. There may be unintentional errors including grammatical errors, spelling errors, or pronoun errors.

## 2023-11-17 NOTE — PATIENT INSTRUCTIONS
-- continue his Depakote unchanged. -- Please increase his Zonisamide to be 4 capsules (400 mg) at night.    -- in 1 week (after Thanksgiving), decrease Lacosamide (Vimpat) to be 150 mg twice a day

## 2023-11-20 ENCOUNTER — APPOINTMENT (OUTPATIENT)
Dept: RADIOLOGY | Facility: CLINIC | Age: 58
End: 2023-11-20
Payer: MEDICARE

## 2023-11-20 ENCOUNTER — OFFICE VISIT (OUTPATIENT)
Dept: OBGYN CLINIC | Facility: CLINIC | Age: 58
End: 2023-11-20
Payer: MEDICARE

## 2023-11-20 VITALS
SYSTOLIC BLOOD PRESSURE: 133 MMHG | BODY MASS INDEX: 20.86 KG/M2 | HEIGHT: 72 IN | DIASTOLIC BLOOD PRESSURE: 59 MMHG | WEIGHT: 154 LBS | HEART RATE: 58 BPM | TEMPERATURE: 97.5 F

## 2023-11-20 DIAGNOSIS — S62.641D CLOSED NONDISPLACED FRACTURE OF PROXIMAL PHALANX OF LEFT INDEX FINGER WITH ROUTINE HEALING, SUBSEQUENT ENCOUNTER: ICD-10-CM

## 2023-11-20 DIAGNOSIS — S62.641D CLOSED NONDISPLACED FRACTURE OF PROXIMAL PHALANX OF LEFT INDEX FINGER WITH ROUTINE HEALING, SUBSEQUENT ENCOUNTER: Primary | ICD-10-CM

## 2023-11-20 PROCEDURE — 29075 APPL CST ELBW FNGR SHORT ARM: CPT | Performed by: FAMILY MEDICINE

## 2023-11-20 PROCEDURE — 99214 OFFICE O/P EST MOD 30 MIN: CPT | Performed by: FAMILY MEDICINE

## 2023-11-20 PROCEDURE — 73130 X-RAY EXAM OF HAND: CPT

## 2023-11-20 NOTE — PROGRESS NOTES
[facility] [address] [address]  [phone]      Assessment:  1. Closed nondisplaced fracture of proximal phalanx of left index finger with routine healing, subsequent encounter  -     XR hand 3+ vw left; Future; Expected date: 11/20/2023        Plan:  Patient Instructions   F/u 2 wks  Radial gutter SAC  XR-  2 wks L hand with cast off. Return in about 2 weeks (around 12/4/2023) for Recheck. Chief Complaint:  Chief Complaint   Patient presents with    Left Index Finger - Follow-up       Subjective:   HPI    Patient ID: Jae Talavera is a 66 y.o. male     Here with caregiver for f/u  L 2nd prox phalanx fx  Having some pain- taking off brace   Wearing wrist brace- keep it on  No pain meds    Review of Systems   Constitutional:  Negative for fatigue and fever. Respiratory:  Negative for shortness of breath. Cardiovascular:  Negative for chest pain. Gastrointestinal:  Negative for abdominal pain and nausea. Genitourinary:  Negative for dysuria. Musculoskeletal:  Positive for arthralgias. Skin:  Negative for rash and wound. Neurological:  Negative for weakness and headaches. Objective:  Vitals:  /59 (BP Location: Left arm, Patient Position: Sitting, Cuff Size: Standard)   Pulse 58   Temp 97.5 °F (36.4 °C) (Tympanic)   Ht 6' (1.829 m)   Wt 69.9 kg (154 lb)   BMI 20.89 kg/m²     The following portions of the patient's history were reviewed and updated as appropriate: allergies, current medications, past family history, past medical history, past social history, past surgical history, and problem list.    Physical exam:  Physical Exam  Constitutional:       Appearance: Normal appearance. He is normal weight. Eyes:      Extraocular Movements: Extraocular movements intact. Pulmonary:      Effort: Pulmonary effort is normal.   Musculoskeletal:         General: Tenderness present.       Cervical back: Normal range of motion. Comments: L 2nd phalanx TTP, swelling   Skin:     General: Skin is warm and dry. Neurological:      General: No focal deficit present. Mental Status: He is alert and oriented to person, place, and time. Mental status is at baseline. Psychiatric:         Mood and Affect: Mood normal.         Behavior: Behavior normal.         Thought Content: Thought content normal.         Judgment: Judgment normal.       Ortho Exam    I have personally reviewed pertinent films in PACS and my interpretation is XR-  L hand- healind 2nd prox phalanx angulated fx. Cast application    Date/Time: 11/20/2023 9:15 AM    Performed by: Geeta Alan MD  Authorized by: Geeta Alan MD  Universal Protocol:  Consent: Verbal consent obtained.   Risks and benefits: risks, benefits and alternatives were discussed  Consent given by: patient  Timeout called at: 11/20/2023 9:13 AM.    Procedure details:     Laterality:  Right    Location:  Arm    Arm:  R lower arm    Strapping: no  Cast type:  Short arm        Supplies:  Cotton padding and fiberglass  Comments:      Elizabeth Alan MD

## 2023-11-20 NOTE — ASSESSMENT & PLAN NOTE
Overall, he has not had any larger seizures, but still has been having some issues with myoclonus. He has had improvement in these with starting zonisamide in the past, and it does seem that he may have some difficulty with walking from his lacosamide. I would like to try to continue to adjust his medications by decreasing lacosamide further and increasing zonisamide since this has been helpful for his myoclonus. --He will increase and this might to be 400 mg nightly. Once he is on that dose for about 1.5 weeks, I will also have him decrease lacosamide to be 150 mg twice a day. His caregiver will keep track of any recurrence of seizures and for the frequency of his myoclonus.   If possible in the future, we could try to fully wean off of lacosamide

## 2023-11-27 ENCOUNTER — OFFICE VISIT (OUTPATIENT)
Dept: FAMILY MEDICINE CLINIC | Facility: CLINIC | Age: 58
End: 2023-11-27
Payer: MEDICARE

## 2023-11-27 VITALS
OXYGEN SATURATION: 98 % | WEIGHT: 156 LBS | HEIGHT: 72 IN | DIASTOLIC BLOOD PRESSURE: 62 MMHG | SYSTOLIC BLOOD PRESSURE: 110 MMHG | TEMPERATURE: 97.6 F | BODY MASS INDEX: 21.13 KG/M2 | HEART RATE: 82 BPM

## 2023-11-27 DIAGNOSIS — I10 ESSENTIAL HYPERTENSION: Chronic | ICD-10-CM

## 2023-11-27 DIAGNOSIS — Z01.10 ENCOUNTER FOR HEARING EXAMINATION, UNSPECIFIED WHETHER ABNORMAL FINDINGS: ICD-10-CM

## 2023-11-27 DIAGNOSIS — E03.9 ACQUIRED HYPOTHYROIDISM: ICD-10-CM

## 2023-11-27 DIAGNOSIS — E11.9 TYPE 2 DIABETES MELLITUS WITHOUT COMPLICATION, WITHOUT LONG-TERM CURRENT USE OF INSULIN (HCC): Chronic | ICD-10-CM

## 2023-11-27 DIAGNOSIS — E08.00 DIABETES MELLITUS DUE TO UNDERLYING CONDITION WITH HYPEROSMOLARITY WITHOUT COMA, WITHOUT LONG-TERM CURRENT USE OF INSULIN (HCC): Primary | ICD-10-CM

## 2023-11-27 DIAGNOSIS — Z23 ENCOUNTER FOR IMMUNIZATION: ICD-10-CM

## 2023-11-27 LAB — SL AMB POCT HEMOGLOBIN AIC: 5.5 (ref ?–6.5)

## 2023-11-27 PROCEDURE — 99214 OFFICE O/P EST MOD 30 MIN: CPT | Performed by: NURSE PRACTITIONER

## 2023-11-27 PROCEDURE — 83036 HEMOGLOBIN GLYCOSYLATED A1C: CPT | Performed by: NURSE PRACTITIONER

## 2023-11-27 NOTE — PROGRESS NOTES
Assessment/Plan:    Problem List Items Addressed This Visit     Type 2 diabetes mellitus without complication, without long-term current use of insulin (HCC) (Chronic)    Relevant Orders    Hemoglobin A1C    Essential hypertension (Chronic)    Acquired hypothyroidism   Other Visit Diagnoses     Diabetes mellitus due to underlying condition with hyperosmolarity without coma, without long-term current use of insulin (HCC)    -  Primary    Relevant Orders    POCT hemoglobin A1c (Completed)    Albumin / creatinine urine ratio    Encounter for immunization        Encounter for hearing examination, unspecified whether abnormal findings        Relevant Orders    Ambulatory Referral to Audiology           Diagnoses and all orders for this visit:    Diabetes mellitus due to underlying condition with hyperosmolarity without coma, without long-term current use of insulin ([address])  -     POCT hemoglobin A1c  -     Albumin / creatinine urine ratio; Future    Encounter for immunization    Encounter for hearing examination, unspecified whether abnormal findings  -     Ambulatory Referral to Audiology; Future    Type 2 diabetes mellitus without complication, without long-term current use of insulin (HCC)  -     Hemoglobin A1C; Future    Acquired hypothyroidism    Essential hypertension        No problem-specific Assessment & Plan notes found for this encounter. Subjective:      Patient ID: Ragini Mena is a 66 y.o. male. Presents for a routine 4 month DM visit. Patient presents with caregiver: Follow-up: 4 month follow up on DM. Pt had a DM eye exam on Nov 1st, will send caregap. Pt have no interval or acute issues. Diabetes  He presents for his follow-up diabetic visit. He has type 2 diabetes mellitus. His disease course has been stable. There are no hypoglycemic associated symptoms. There are no diabetic associated symptoms. There are no hypoglycemic complications. Symptoms are stable.  There are no diabetic complications. Risk factors for coronary artery disease include diabetes mellitus and male sex. Current diabetic treatment includes oral agent (monotherapy) and diet. He is compliant with treatment all of the time. He is following a generally healthy diet. Meal planning includes avoidance of concentrated sweets. He never participates in exercise. He does not see a podiatrist.Eye exam is current. Hypertension  This is a chronic problem. The problem is controlled. There are no associated agents to hypertension. Risk factors for coronary artery disease include dyslipidemia. Past treatments include ACE inhibitors. The current treatment provides significant improvement. Compliance problems include exercise. There is no history of angina, kidney disease or CAD/MI. A1c noted   Lab Results   Component Value Date    HGBA1C 5.5 11/27/2023        Recommend lifestyle and dietary changes which include plant based low glycemic diet. Will monitor in 4  months. The following portions of the patient's history were reviewed and updated as appropriate:   He has a past medical history of ADRIANA (acute kidney injury) ([address]) (9/24/2019), Bacteremia due to Gram-positive bacteria (9/7/2021), Buttock wound, left, subsequent encounter (11/5/2021), COVID-19, CP (cerebral palsy) ([address]), Depression, Diabetes ([address]), Disease of thyroid gland, Gait disorder, Gluteal abscess (9/6/2021), Hyperlipidemia, Hypertension, Kidney failure, Kidney stones, Moderate protein-calorie malnutrition ([address]) (12/22/2021), Osteoporosis, Pressure injury of left buttock, stage 4 ([address]) (3/9/2022), Psychiatric disorder, Scoliosis, Seizures ([address]), Sepsis ([address]) (9/25/2019), Thyroid disease, and UTI (urinary tract infection). ,  does not have any pertinent problems on file. ,   has a past surgical history that includes Appendectomy; IR PICC placement single lumen (9/13/2021); and IR PICC placement single lumen (9/16/2021). ,  family history is not on file. , reports that he has never smoked. He has never used smokeless tobacco. He reports that he does not drink alcohol and does not use drugs. ,  is allergic to erythromycin and penicillins. .  Current Outpatient Medications   Medication Sig Dispense Refill   • benztropine (COGENTIN) 0.5 mg tablet Take 2 tablets (1 mg total) by mouth 2 (two) times a day (Patient taking differently: Take 0.5 mg by mouth 2 (two) times a day) 60 tablet 11   • Blood Glucose Monitoring Suppl (Rayelijahld Sacks) w/Device KIT by Does not apply route 3 (three) times a day TEST BLOOD SUGARS THREE TIMES (Patient not taking: Reported on 7/7/2023) 1 kit 0   • calcium carbonate (OYSTER SHELL,OSCAL) 500 mg Take 1 tablet by mouth 3 (three) times a day 240 tablet 3   • cholecalciferol (VITAMIN D3) 1,000 units tablet Take 1 tablet (1,000 Units total) by mouth daily 90 tablet 1   • clonazePAM (KlonoPIN) 0.25 MG disintegrating tablet Take 1 tablet (0.25 mg total) by mouth as needed for seizures (clusters of myoclonic jerking (more than 2 myoclonic jerks in a day)) for up to 1 dose 10 tablet 2   • Depakote 500 MG DR tablet GIVE 1 TABLET BY MOUTH EVERY MORNING **BRAND NAME ONLY** ~ GIVE 2 TABLETS BY MOUTH AT BEDTIME (=1000MG) FOR SEIZURE DISORDER DR. LIVE JONES 90 tablet 11   • Depakote  MG 24 hr tablet Take 1 tablet (250 mg total) by mouth daily Take in the morning with one 500 mg tablet (total morning dose of 750 mg). BRAND ONLY 90 tablet 1   • Disposable Gloves (Safe-Sense Glove-Blk-Nitrl-L) MISC Use 1 each 3 (three) times a day as needed (care taker assisting with soiled briefs) (Patient not taking: Reported on 9/28/2023) 100 each 1   • docusate sodium (COLACE) 100 mg capsule GIVE 1 CAPSULE BY MOUTH TWICE DAILY FOR STOOL SOFTNER DR. RADU AVERY 56 capsule 0   • gabapentin (NEURONTIN) 300 mg capsule Take 300 mg by mouth 3 (three) times a day     • hydrOXYzine pamoate (VISTARIL) 25 mg capsule Give 1 capsule PO in the AM and around 2PM for anxiety 56 capsule 0   • ibuprofen (MOTRIN) 600 mg tablet Take 600 mg by mouth every 6 (six) hours as needed for mild pain     • Incontinence Supply Disposable (Briefs Overnight Medium) MISC Use in the morning (Patient not taking: Reported on 9/28/2023) 20 each 11   • lacosamide (VIMPAT) 150 mg tablet Take 1 tablet (150 mg total) by mouth every 12 (twelve) hours 60 tablet 5   • levETIRAcetam (KEPPRA) 750 mg tablet GIVE 2 TABLETS BY MOUTH TWICE DAILY (=1500MG) FOR SEIZURE DISORDER DR. LIVE JONES 120 tablet 11   • levothyroxine 150 mcg tablet Take 1 tablet (150 mcg total) by mouth daily 90 tablet 1   • lisinopril (ZESTRIL) 10 mg tablet Take 1 tablet (10 mg total) by mouth daily 90 tablet 1   • loratadine (CLARITIN) 10 mg tablet Take 10 mg by mouth daily     • metFORMIN (GLUCOPHAGE) 1000 MG tablet Take 1 tablet (1,000 mg total) by mouth 2 (two) times a day with meals 180 tablet 0   • Multiple Vitamin (Daily-Reina) TABS Take 1 tablet by mouth daily Take at 8 AM 90 tablet 11   • OneTouch Delica Lancets 90F MISC by Does not apply route daily TEST BLOOD SUGARS THREE TIMES DAILY (Patient not taking: Reported on 7/7/2023) 100 each 2   • QUEtiapine (SEROquel XR) 200 mg 24 hr tablet TAKE 1 TABLET BY MOUTH TWICE A DAY (8AM,2PM) 180 tablet 1   • QUEtiapine (SEROquel XR) 400 mg 24 hr tablet TAKE 1 TABLET BY MOUTH AT BEDTIME (8PM) (DX: ANTIPSYCHOTIC) 90 tablet 1   • simvastatin (ZOCOR) 40 mg tablet Take 1 tablet (40 mg total) by mouth daily at bedtime 90 tablet 1   • temazepam (RESTORIL) 15 mg capsule Take 1 capsule (15 mg total) by mouth daily at bedtime as needed for sleep 30 capsule 1   • vitamin B-12 (VITAMIN B-12) 1,000 mcg tablet Take 1 tablet (1,000 mcg total) by mouth daily (Patient taking differently: Take 1,000 mcg by mouth every other day) 30 tablet 5   • zonisamide (ZONEGRAN) 100 mg capsule Take 4 capsules (400 mg) nightly. 120 capsule 11     No current facility-administered medications for this visit.            Review of Systems   All other systems reviewed and are negative. Objective:  Vitals:    11/27/23 1028   BP: 110/62   Pulse: 82   Temp: 97.6 °F (36.4 °C)   TempSrc: Tympanic   SpO2: 98%   Weight: 70.8 kg (156 lb)   Height: 6' (1.829 m)     Body mass index is 21.16 kg/m². Physical Exam  Vitals and nursing note reviewed. Constitutional:       Appearance: Normal appearance. He is well-developed. HENT:      Head: Normocephalic and atraumatic. Right Ear: Tympanic membrane, ear canal and external ear normal.      Left Ear: Tympanic membrane, ear canal and external ear normal.      Nose: Nose normal.      Mouth/Throat:      Mouth: Mucous membranes are moist.      Pharynx: Uvula midline. Eyes:      General: Lids are normal.      Conjunctiva/sclera: Conjunctivae normal.      Pupils: Pupils are equal, round, and reactive to light. Neck:      Thyroid: No thyroid mass. Vascular: No JVD. Trachea: Trachea and phonation normal.   Cardiovascular:      Rate and Rhythm: Normal rate and regular rhythm. Pulses: Normal pulses. Heart sounds: Normal heart sounds, S1 normal and S2 normal. No murmur heard. No friction rub. No gallop. Pulmonary:      Effort: Pulmonary effort is normal.      Breath sounds: Normal breath sounds. Abdominal:      General: Bowel sounds are normal.      Palpations: Abdomen is soft. Tenderness: There is no abdominal tenderness. Genitourinary:     Comments: Deferred  Musculoskeletal:         General: Normal range of motion. Cervical back: Full passive range of motion without pain, normal range of motion and neck supple. Right lower leg: No edema. Left lower leg: No edema. Lymphadenopathy:      Head:      Right side of head: No submental, submandibular, tonsillar, preauricular, posterior auricular or occipital adenopathy. Left side of head: No submental, submandibular, tonsillar, preauricular, posterior auricular or occipital adenopathy. Cervical: No cervical adenopathy. Skin:     General: Skin is warm and dry. Capillary Refill: Capillary refill takes less than 2 seconds. Neurological:      General: No focal deficit present. Mental Status: He is alert and oriented to person, place, and time. Sensory: Sensation is intact. Motor: Motor function is intact. Coordination: Coordination is intact. Gait: Gait is intact. Psychiatric:         Attention and Perception: Attention and perception normal.         Mood and Affect: Mood and affect normal.         Speech: Speech normal.         Behavior: Behavior normal. Behavior is cooperative. Thought Content: Thought content normal.         Cognition and Memory: Cognition normal.         Judgment: Judgment normal.           Portions of the record may have been created with voice recognition software. Occasional wrong word or "sound a like" substitutions may have occurred due to the inherent limitations of voice recognition software. Read the chart carefully and recognize, using context, where substitutions have occurred. Contact me with any questions.

## 2023-11-27 NOTE — PATIENT INSTRUCTIONS
CARBOHYDRATES  As a carbohydrate is digested through our bodies it becomes a sugar. There are TWO main things I want you to know about CARBOHYDRATES:     1. NUMBER     How many carbohydrates are in each serving of food you are about to eat matters to weight loss/gain, diabetes control, and sugar levels. A food product is in the LOW carbohydrate level if it has less than 15grams carbohydrates per serving. These are always good choices in general for a snack. A meal can include anywhere from 15-45 grams carbohydrates in the meal.      TIP: Sugar Free foods contain carbohydrates which become sugar in the body. If you are going to consider eating sugar free foods, you MUST follow serving size carefully. These foods will still result in high glucose levels. 2. TYPE     The type of carbohydrate is VERY important. A slice of bread (white or wheat) will have same amount of carbohydrates but both break down at different paces in the body. Carbohydrates that are more complex like Rye, whole wheat, and multi grain process slower through our bodies, therefore, making sugar levels rise slowly and steady and over long period of time. WHITE carbohydrates such as white bread, white pasta and white rice digest quickly in your body and raise your glucose levels fast just like eating a candy bar. TIP: Read the INGREDIENTS on each item to make sure the first ingredient is WHOLE WHEAT or Rye  (not enriched white flour) rather than reading front label of the product in order to verify whole wheat product. Simple carbohydrates digest through your body QUICKLY causing the conversion of carbohydrates to become sugars if you are not using them immediately for energy. Complex carbohydrates will become sugars SLOWLY over time as your body breaks them down in your digestive system. Below is a graph demonstrating these two concepts.   The rate of sugar breakdown can easily affect your energy, metabolism, ability to gain or lose weight and more. The goal is a slow and steady release of sugar into your body and to avoid sugar spikes in order to feel your best and manage your health. Simple Carbohydrates: Candy bar, white bread, white pasta, white rice, white flour, cookies, sweets, cakes, corn, string beans etc               A little information about carbs . .. ·         Try for a low carbohydrate diet. This means less than 100 grams per day. ·         Try to get a high amount of protein per day - shoot for 60-70 grams per day. ·         To really lose weight you may need to try an ultra low carb diet - this means 10-15 grams per meal. And 5 - 10 grams per snack. This is usually under the supervision of a physician weight loss program.   ·         Carbohydrates are in starches and turn to sugars. Lower your intake of bread, pasta, potatoes, rice. Never drink your carbs - switch to water. No juice or soda. ·         When you do consume carbs, try for high fiber choices like fruits and veggies. Try for complex carbohydrates found in oats and whole grains. A daily fiber supplement can also be helpful. Here is a lot more information …     Understanding Carbohydrates  A car needs the right type of fuel to run. And you need the right kind of food to function. To keep your energy level up, your body needs food that has carbohydrates. But carbohydrates raise blood sugar levels higher and faster than other kinds of food. Starches - AVOID   Starches are found in grains, some vegetables, and beans. Grain products include bread, pasta, cereal, and tortillas. Starchy vegetables include potatoes, peas, corn, lima beans, yams, and squash. Kidney beans, jackson beans, black beans, garbanzo beans, and lentils also contain starches. Sugars - AVOID  Sugars are found naturally in many foods. Or sugar can be added.  Foods that contain natural sugar include fruits and fruit juices, dairy products, honey, and molasses. Added sugars are found in most desserts, processed foods, candy, regular soda, and fruit drinks. These are very helpful for treating low blood sugar, or hypoglycemia. They provide sugar quickly. Fiber - A BETTER CHOICE  Fiber comes from plant foods. Most fiber isn’t digested by the body. Instead of raising blood sugar levels like other carbohydrates, it actually stops blood sugar from rising too fast. Fiber is found in fruits, vegetables, whole grains, beans, peas, and many nuts. Carb counting  It’s important to keep track of the amount of carbohydrates you eat. This can help you keep the right balance of physical activity and medicine. The amount of carbohydrates needed will vary for each person. It depends on many things such as your health, the medicines you take, and how active you are. You may start with around 45 to 60 grams of carbohydrate per meal, depending on your situation. Counting your carbohydrates is a good way to control how many you eat. You can do this by keeping a food diary. There are great apps to help with this too like My Fitness Pal.      Carbohydrates come from a variety of foods. These include grains, starchy vegetables, fruit, milk, beans, and snack foods. You can either count carbohydrate grams or carbohydrate servings. When you count carbohydrate servings, 1 carbohydrate serving = 15 grams of carbohydrates.      Here are some examples of foods containing about 15 grams of carbohydrates (1 serving of carbohydrates):  ·      1/2 cup of canned or frozen fruit  ·      A small piece of fresh fruit (4 ounces)  ·      1 slice of bread  ·      1/2 cup of oatmeal  ·      1/3 cup of rice  ·      4 to 6 crackers  ·      1/2 English muffin  ·      1/2 cup of black beans  ·      1/4 of a large baked potato (3 ounces)  ·      2/3 cup of plain fat-free yogurt  ·      1 cup of soup  ·      1/2 cup of casserole  ·      6 chicken nuggets  ·      2-inch square brownie or cake without frosting  · 2 small cookies  ·      1/2 cup of ice cream or sherbet     Carb counting is easier when food labels are available. Look at the label to see how many grams of total carbohydrates the food contains. Then you can figure out how much you should eat. It’s also important to be consistent with the amount and time you eat when taking a fixed dose of diabetes medicine. Make your meal plan  A meal plan gives guidelines for the types and amounts of food you should eat. Watch serving sizes  Your meal plan will group foods by servings. To learn how much a serving is, start by measuring food portions at each meal. Soon you’ll know what a serving looks like on your plate. A quick rule is a serving size of meat is about the size of your fist.   Eat from all the food groups  The basis of a healthy meal plan is variety (eating lots of different foods). Choose lean meats, fresh fruits and vegetables, whole grains, and low-fat or nonfat dairy products. Eating a wide variety of foods provides the nutrients your body needs. It can also keep you from getting bored with your meal plan. Learn about carbohydrates, fats, and protein  ·      Carbohydrates are starches, sugars, and fiber. They are found in many foods, including fruit, bread, pasta, milk, and sweets. Of all the foods you eat, carbohydrates have the most effect on your blood sugar. ·      Fats have the most calories. They also have the most effect on your weight and your risk of heart disease. It’s important to control your weight and protect your heart. Foods that are high in fat include whole milk, cheese, snack foods, and desserts. ·      Protein is important for building and repairing muscles and bones. Choose low-fat protein sources, such as fish, egg whites, and skinless chicken. Reduce liquid sugars  Extra calories from sodas, sports drinks, and fruit drinks make it hard to keep blood sugar in range.  Cut as many liquid sugars from your meal plan as you can.  This includes most fruit juices, which are often high in natural or added sugar. Instead, drink plenty of water and other sugar-free beverages. Eat less fat  If you need to lose weight, try to reduce the amount of fat in your diet. This can also help lower your cholesterol level to keep blood vessels healthier. Cut fat by using only small amounts of liquid oil for cooking. Read food labels carefully to avoid foods with unhealthy trans fats. Timing your meals  When it comes to blood sugar control, when you eat is as important as what you eat. You may need to eat several small meals spaced evenly throughout the day to stay in your target range. So don’t skip breakfast or wait until late in the day to get most of your calories. Doing so can cause your blood sugar to rise too high or fall too low. Understanding Carbohydrates, Fats, and Protein  Food is a source of fuel and nourishment for your body. It’s also a source of pleasure. Having diabetes doesn’t mean you have to eat special foods or give up desserts. Instead, your dietitian can show you how to plan meals to suit your body. To start, learn how different foods affect blood sugar. Carbohydrates  Carbohydrates are the main source of fuel for the body. Carbohydrates raise blood sugar. Many people think carbohydrates are only found in pasta or bread. But carbohydrates are actually in many kinds of foods:  ·      Sugars occur naturally in foods such as fruit, milk, honey, and molasses. Sugars can also be added to many foods, from cereals and yogurt to candy and desserts. Sugars raise blood sugar. ·      Starches are found in bread, cereals, pasta, and dried beans. They’re also found in corn, peas, potatoes, yam, acorn squash, and butternut squash. Starches also raise blood sugar. ·      Fiber is found in foods such as vegetables, fruits, beans, and whole grains. Unlike other carbs, fiber isn’t digested or absorbed.  So it doesn’t raise blood sugar. In fact, fiber can help keep blood sugar from rising too fast. It also helps keep blood cholesterol at a healthy level. Did you know? Even though carbohydrates raise blood sugar, it’s best to have some in every meal. They are an important part of a healthy diet. Fat  Fat is an energy source that can be stored until needed. Fat does not raise blood sugar. However, it can raise blood cholesterol, increasing the risk of heart disease. Fat is also high in calories, which can cause weight gain. Not all types of fat are the same. More Healthy:  ·      Monounsaturated fats are mostly found in vegetable oils, such as olive, canola, and peanut oils. They are also found in avocados and some nuts. Monounsaturated fats are healthy for your heart. That’s because they lower LDL (unhealthy) cholesterol. ·      Polyunsaturated fats are mostly found in vegetable oils, such as corn, safflower, and soybean oils. They are also found in some seeds, nuts, and fish. Polyunsaturated fats lower LDL (unhealthy) cholesterol. So, choosing them instead of saturated fats is healthy for your heart. Certain unsaturated fats can help lower triglycerides. Less Healthy:  ·      Saturated fats are found in animal products, such as meat, poultry, whole milk, lard, and butter. Saturated fats raise LDL cholesterol and are not healthy for your heart. ·      Hydrogenated oils and trans fats are formed when vegetable oils are processed into solid fats. They are found in many processed foods. Hydrogenated oils and trans fats raise LDL cholesterol and lower HDL (healthy) cholesterol. They are not healthy for your heart. Protein  Protein helps the body build and repair muscle and other tissue. Protein has little or no effect on blood sugar. However, many foods that contain protein also contain saturated fat.  By choosing low-fat protein sources, you can get the benefits of protein without the extra fat:  ·      Plant protein is found in dry beans and peas, nuts, and soy products, such as tofu and soymilk. These sources tend to be cholesterol-free and low in saturated fat. ·      Animal protein is found in fish, poultry, meat, cheese, milk, and eggs. These contain cholesterol and can be high in saturated fat. Aim for lean, lower-fat choices. Reading food labels  Pay close attention to food labels. The information on them will help you choose healthy foods that make managing your blood sugar easier. Look for the Nutrition Facts label on packaged foods. This label tells you how many carbohydrates and how much sugar, fat, and fiber are in each serving. Then you can decide whether the food fits your meal plan. Reading food labels helps you keep track of your carbohydrate intake. Remember to pay attention to serving sizes. If you eat this entire box, he will have eaten 34 grams of carbohydrate. ·      Serving size: This number is very important and tells you how much of the food makes up a single serving. If you eat more than one serving, all other numbers, like calories and carbohydrates, also increase. ·      Total carbohydrates: This number tells you how much carbohydrate is in each serving. If you are carb counting, this number will help you fit the food into your meal plan. Also, keep in mind the number of servings you eat. If  you eat two servings, you’ll need to double the amount of carbohydrates listed on the box. ·      Sugars: This number includes both natural and added sugars. Sugars count as part of your carbohydrate intake, and are included in the total carbohydrate number on the label. ·      Fat: This number tells you the total amount of fat in each serving. Watch out for saturated fats, which raise cholesterol. Limit fats, especially if you are trying to lose weight. ·      Trans fat: This number tells you if the food includes trans fat. Liquid oils made into a solid fat, such as margarine, have a lot of trans fat.  Trans fat is bad for the heart. Try to avoid foods containing trans fat. ·      Dietary fiber: This number tells you how much of the carbohydrate in the food is fiber. Foods high in fiber are healthy. They also help keep blood sugar levels steady. Learning portion sizes  Portion control is an important part of healthy eating. How much food you eat affects your blood sugar. And until you learn what portion sizes look like, use measuring cups and spoons to be sure portions are accurate. Adapted from:  © 2486-9347 The [address] [place] [address] [place], [place], [address] [facility]. All rights reserved. This information is not intended as a substitute for professional medical care. Always follow your healthcare professional's instructions.

## 2023-12-04 ENCOUNTER — OFFICE VISIT (OUTPATIENT)
Dept: OBGYN CLINIC | Facility: CLINIC | Age: 58
End: 2023-12-04
Payer: MEDICARE

## 2023-12-04 ENCOUNTER — APPOINTMENT (OUTPATIENT)
Dept: RADIOLOGY | Facility: CLINIC | Age: 58
End: 2023-12-04
Payer: MEDICARE

## 2023-12-04 VITALS
SYSTOLIC BLOOD PRESSURE: 128 MMHG | HEIGHT: 72 IN | WEIGHT: 154.4 LBS | TEMPERATURE: 97 F | DIASTOLIC BLOOD PRESSURE: 68 MMHG | BODY MASS INDEX: 20.91 KG/M2 | HEART RATE: 76 BPM

## 2023-12-04 DIAGNOSIS — S62.641D CLOSED NONDISPLACED FRACTURE OF PROXIMAL PHALANX OF LEFT INDEX FINGER WITH ROUTINE HEALING, SUBSEQUENT ENCOUNTER: Primary | ICD-10-CM

## 2023-12-04 DIAGNOSIS — S62.641D CLOSED NONDISPLACED FRACTURE OF PROXIMAL PHALANX OF LEFT INDEX FINGER WITH ROUTINE HEALING, SUBSEQUENT ENCOUNTER: ICD-10-CM

## 2023-12-04 PROCEDURE — 99214 OFFICE O/P EST MOD 30 MIN: CPT | Performed by: FAMILY MEDICINE

## 2023-12-04 PROCEDURE — 73130 X-RAY EXAM OF HAND: CPT

## 2023-12-04 NOTE — PROGRESS NOTES
[facility] [address] [address]  [phone]      Assessment:  1. Closed nondisplaced fracture of proximal phalanx of left index finger with routine healing, subsequent encounter  -     XR hand 3+ vw left; Future; Expected date: 12/04/2023  -     Ambulatory Referral to Occupational Therapy; Future        Plan:  Patient Instructions   F/u 3 wks  Begin occupational therapy- begin range of motion     Return in about 3 weeks (around 12/25/2023) for Recheck. Chief Complaint:  Chief Complaint   Patient presents with    Left Hand - Follow-up       Subjective:   HPI    Patient ID: Torri Stanley is a 66 y.o. male     Here with caregiver for f/u  L 2nd prox phalanx fx  No pain  No complaints  Wearing cast  No pain meds       Review of Systems   Constitutional:  Negative for fatigue and fever. Respiratory:  Negative for shortness of breath. Cardiovascular:  Negative for chest pain. Gastrointestinal:  Negative for abdominal pain and nausea. Genitourinary:  Negative for dysuria. Musculoskeletal:  Negative for arthralgias. Skin:  Negative for rash and wound. Neurological:  Negative for weakness and headaches. Objective:  Vitals:  /68 (BP Location: Left arm, Patient Position: Sitting, Cuff Size: Standard)   Pulse 76   Temp (!) 97 °F (36.1 °C) (Tympanic)   Ht 6' (1.829 m)   Wt 70 kg (154 lb 6.4 oz)   BMI 20.94 kg/m²     The following portions of the patient's history were reviewed and updated as appropriate: allergies, current medications, past family history, past medical history, past social history, past surgical history, and problem list.    Physical exam:  Physical Exam  Constitutional:       Appearance: Normal appearance. He is normal weight. Eyes:      Extraocular Movements: Extraocular movements intact.    Pulmonary:      Effort: Pulmonary effort is normal.   Musculoskeletal:         General: No tenderness. Cervical back: Normal range of motion. Comments: L 2nd prox phalanx- no TTP  Dec ROM, stiffness   Skin:     General: Skin is warm and dry. Neurological:      General: No focal deficit present. Mental Status: He is alert and oriented to person, place, and time. Mental status is at baseline. Psychiatric:         Mood and Affect: Mood normal.         Behavior: Behavior normal.         Thought Content: Thought content normal.         Judgment: Judgment normal.       Ortho Exam    I have personally reviewed pertinent films in PACS and my interpretation is XR L hand-  prox 2nd phalanx healing fx.       Humberto Stratton MD

## 2023-12-05 DIAGNOSIS — K59.00 CONSTIPATION, UNSPECIFIED CONSTIPATION TYPE: ICD-10-CM

## 2023-12-05 DIAGNOSIS — G20.C PARKINSON'S PLUS SYNDROME: ICD-10-CM

## 2023-12-05 DIAGNOSIS — E03.8 HYPOTHYROIDISM DUE TO HASHIMOTO'S THYROIDITIS: ICD-10-CM

## 2023-12-05 DIAGNOSIS — E06.3 HYPOTHYROIDISM DUE TO HASHIMOTO'S THYROIDITIS: ICD-10-CM

## 2023-12-05 RX ORDER — LEVOTHYROXINE SODIUM 0.15 MG/1
TABLET ORAL
Qty: 28 TABLET | Refills: 1 | Status: SHIPPED | OUTPATIENT
Start: 2023-12-05

## 2023-12-05 RX ORDER — BENZTROPINE MESYLATE 0.5 MG/1
TABLET ORAL
Qty: 56 TABLET | Refills: 0 | Status: SHIPPED | OUTPATIENT
Start: 2023-12-05

## 2023-12-05 RX ORDER — DOCUSATE SODIUM 100 MG/1
CAPSULE, LIQUID FILLED ORAL
Qty: 56 CAPSULE | Refills: 0 | Status: SHIPPED | OUTPATIENT
Start: 2023-12-05

## 2023-12-12 ENCOUNTER — EVALUATION (OUTPATIENT)
Dept: OCCUPATIONAL THERAPY | Facility: CLINIC | Age: 58
End: 2023-12-12
Payer: MEDICARE

## 2023-12-12 DIAGNOSIS — S62.641D CLOSED NONDISPLACED FRACTURE OF PROXIMAL PHALANX OF LEFT INDEX FINGER WITH ROUTINE HEALING, SUBSEQUENT ENCOUNTER: ICD-10-CM

## 2023-12-12 PROCEDURE — 97110 THERAPEUTIC EXERCISES: CPT

## 2023-12-12 PROCEDURE — 97165 OT EVAL LOW COMPLEX 30 MIN: CPT

## 2023-12-12 NOTE — PROGRESS NOTES
OT Evaluation     Today's date: 2023  Patient name: Judith Card  : 1965  MRN: [id number]  Referring provider: Tavo Rios MD  Dx:   Encounter Diagnosis     ICD-10-CM    1. Closed nondisplaced fracture of proximal phalanx of left index finger with routine healing, subsequent encounter  S62.641D Ambulatory Referral to Occupational Therapy                     Assessment  Assessment details: Judith Card is a 66 y.o., Right HD male referred to hand therapy for a LIF proximal phalanx fracture. Onset of injury 10/19/23 due to fall. Patient presents 23 with impaired ROM, strength of the LIF and hand. Deficits also noted in pain, edema and functional use of the left UE. Patient has intellectual disability and lives in a group home. Patient is a good candidate for OT services to abolish pain and edema and restore ROM, strength, coordination for a return to independence in daily tasks.     Impairments: abnormal coordination, abnormal or restricted ROM, activity intolerance, impaired physical strength, lacks appropriate home exercise program and pain with function  Other impairment: Intellectual disability  Functional limitations: Not using LIF for grapsing during daily tasks  Symptom irritability: moderateUnderstanding of Dx/Px/POC: excellent   Prognosis: good    Goals  STGs (4 weeks)  Patient will be independent in HEP of ROM, edema management  Patient will report "low" pain level  Patient will demonstrate 20 degree improvement in KING of the LIF  Patient will demonstrate active wrist extension/flexion to [facility]  LTGs (12 weeks)  Patient will demonstrate independence in a HEP to maintain ROM, strength, and function at discharge  Patient will report resolution of pain to be independent in daily tasks  Patient will demonstrate KING of the LIF to 210 degrees to be independent in self care tasks  Patient will demonstrate AROM of the wrist and forearm WFL to be independent in self care tasks  Patient will demonstrate 5/5 muscle strength in the wrist and forearm to be MI for meal prep  Patient will demonstrate left  strength within 30% of the right hand to be MI for IADL tasks  Patient will demonstrate resolution of edema      Plan  Plan details: 12/12/23: Begin OT 2x/wk x 12 weeks  Patient would benefit from: skilled occupational therapy and custom splinting  Planned modality interventions: ultrasound, thermotherapy: hydrocollator packs and cryotherapy  Planned therapy interventions: activity modification, compression, fine motor coordination training, graded activity, graded exercise, home exercise program, therapeutic exercise, therapeutic activities, stretching, strengthening, patient education, orthotic fitting/training, neuromuscular re-education, manual therapy, IASTM, joint mobilization, kinesiology taping, massage and muscle pump exercises  Frequency: 2x week  Duration in weeks: 12  Plan of Care beginning date: 12/12/2023  Plan of Care expiration date: 3/8/2024  Treatment plan discussed with: patient      Subjective Evaluation    History of Present Illness  Date of onset: 10/19/2023  Mechanism of injury: This is a 53-year-old male presenting who reports he fell down the driveway going to the car and landed on his L hand  Seen in ER 10/20/23. XR done and patient put in a splint and referred to orthopedics. Patient had a closed reduction. Splint has been discharged and patient now presents for OT evaluation and treatment of left hand pain and stiffness    Patient Goals  Patient goals for therapy: decreased edema, decreased pain, increased motion, increased strength and independence with ADLs/IADLs    Pain  Location: Patient unable to rate pain level due to intelectual impairment, but reports " alot" of pain in [address] with: Lives in group home.       Diagnostic Tests  X-ray: abnormal (healing fracture)  Treatments  Previous treatment: immobilization    Objective     Observations Left Wrist/Hand   Positive for deformity and edema. Additional Observation Details  12/12/23: LIF PIP flexion contracture    Tenderness     Additional Tenderness Details  12/12/23: Tender with palpation of PIP jt LIF    Neurological Testing     Sensation     Wrist/Hand   Left   Intact: light touch    Active Range of Motion     Left Wrist   Wrist flexion: 30 degrees   Wrist extension: 60 degrees     Left Thumb     Opposition: 12/12/23: Grossly intact    Left Digits    Flexion   Index     MCP: 45    PIP: 75    DIP: 40  Extension   Index     PIP: -60    DIP: -15  Index: 105 degrees    Additional Active Range of Motion Details  12/12/23: digits 3-5 WNL. Passive Range of Motion     Left Digits   Flexion   Index     MCP: 65    PIP: 80    DIP: 70  Extension   Index     PIP: -25    DIP: -10  Index: 180 degrees    Swelling     Left Wrist/Hand   Index     Proximal: 8 cm    Middle: 6 cm    Distal: 5 cm    Right Wrist/Hand   Index     Proximal: 7 cm    Middle: 5.5 cm    Distal: 5 cm             Precautions HTN; DM; Intellectual disability. DOI 10/19/23         POC expires Unit limit Auth  expiration date PT/OT + Visit Limit?   3/8/24 NA 3/8/23 BOMN                 Visit/Unit Tracking  AUTH Status:  Date 12/12              BOMN.  Re due 1/5/24 Used 1               Remaining                     Date 12/12/23       Visit  1       Manuals        Edema massage 8'                               Neuro Re-Ed                                                                 Ther Ex        AAROM LIF x20       Blocking LIF        Digit abd/add        Active fisting x10                                       Ther Activity        Rolling FB for digit ext x20       Opposition Foam cubes       Gross grasp Cones 1 set               HEP POC; rolling and grasping water bottle;        Picking up popsicle sticks               Modalities        MHP 5'

## 2023-12-15 ENCOUNTER — OFFICE VISIT (OUTPATIENT)
Dept: OCCUPATIONAL THERAPY | Facility: CLINIC | Age: 58
End: 2023-12-15
Payer: MEDICARE

## 2023-12-15 DIAGNOSIS — S62.641D CLOSED NONDISPLACED FRACTURE OF PROXIMAL PHALANX OF LEFT INDEX FINGER WITH ROUTINE HEALING, SUBSEQUENT ENCOUNTER: Primary | ICD-10-CM

## 2023-12-15 PROCEDURE — 97110 THERAPEUTIC EXERCISES: CPT

## 2023-12-15 PROCEDURE — 97140 MANUAL THERAPY 1/> REGIONS: CPT

## 2023-12-15 PROCEDURE — 97530 THERAPEUTIC ACTIVITIES: CPT

## 2023-12-15 NOTE — PROGRESS NOTES
Daily Note     Today's date: 12/15/2023  Patient name: Lizabeth Forman  : 1965  MRN: [id number]  Referring provider: Felix Marinelli MD  Dx:   Encounter Diagnosis     ICD-10-CM    1. Closed nondisplaced fracture of proximal phalanx of left index finger with routine healing, subsequent encounter  S62.641D                      Subjective: My finger doesn't hurt. I did some exercises      Objective: See treatment diary below      Assessment: Tolerated treatment fair. Patient would benefit from continued OT. Passive MP flexion to 60 degrees and passive PIP extension to -25. He is using the finger more spontaneously for tasks      Plan: Continue per plan of care. Precautions HTN; DM; Intellectual disability. DOI 10/19/23         POC expires Unit limit Auth  expiration date PT/OT + Visit Limit?   3/8/24 NA 3/8/23 BOMN                 Visit/Unit Tracking  AUTH Status:  Date 12/12 12/15             BOMN. Re due 24 Used 1 2              Remaining                     Date 12/12/23 12/15/23      Visit  1 2      Manuals        Edema massage 8' 8'                              Neuro Re-Ed                                                                 Ther Ex        AAROM LIF x20 10' PROM      Blocking LIF  X20 DIP, PIP      Digit abd/add  x20      Active fisting x10 x20       strength  HG 25# x 20;  Y PW x 20                              Ther Activity        Rolling FB for digit ext x20 x20      Opposition Foam cubes Foam cubes, golf ashutosh peg board      Gross grasp Cones 1 set Macaroni transfer 1 set              HEP POC; rolling and grasping water bottle;        Picking up popsicle sticks               Modalities        MHP 5' 5'

## 2023-12-19 ENCOUNTER — OFFICE VISIT (OUTPATIENT)
Dept: OCCUPATIONAL THERAPY | Facility: CLINIC | Age: 58
End: 2023-12-19
Payer: MEDICARE

## 2023-12-19 DIAGNOSIS — S62.641D CLOSED NONDISPLACED FRACTURE OF PROXIMAL PHALANX OF LEFT INDEX FINGER WITH ROUTINE HEALING, SUBSEQUENT ENCOUNTER: Primary | ICD-10-CM

## 2023-12-19 PROCEDURE — 97530 THERAPEUTIC ACTIVITIES: CPT

## 2023-12-19 PROCEDURE — 97110 THERAPEUTIC EXERCISES: CPT

## 2023-12-19 PROCEDURE — 97140 MANUAL THERAPY 1/> REGIONS: CPT

## 2023-12-19 NOTE — PROGRESS NOTES
Daily Note     Today's date: 2023  Patient name: Jairon Oconnell  : 1965  MRN: [id number]  Referring provider: Gilles Minor MD  Dx:   Encounter Diagnosis     ICD-10-CM    1. Closed nondisplaced fracture of proximal phalanx of left index finger with routine healing, subsequent encounter  S62.641D                      Subjective: Patient reported some pain during PROM of left index finger      Objective: See treatment diary below  AROM LIF after tx: MP 0/55  PIP -60/75  DIP 0/50  KING = 120 ( improved 35 degrees )    Assessment: Tolerated treatment well. Patient would benefit from continued OT. Patient demonstrates improved AROM in the LIF, though PIP extension has not changed      Plan: Continue per plan of care.      Precautions HTN; DM; Intellectual disability. DOI 10/19/23         POC expires Unit limit Auth  expiration date PT/OT + Visit Limit?   3/8/24 NA 3/8/23 BOMN                 Visit/Unit Tracking  AUTH Status:  Date 12/12 12/15 12/19            BOMN. Re due 24 Used 1 2 3             Remaining                     Date 12/12/23 12/15/23 12/19/23     Visit  1 2 3     Manuals        Edema massage 8' 8' 5'     Jt mobs LIF MP, PIP   3'                     Neuro Re-Ed                                                                 Ther Ex        AAROM LIF x20 10' PROM 10' PROM     Blocking LIF  X20 DIP, PIP X20 IPs     Digit abd/add  x20 x20     Active fisting x10 x20 x20      strength  HG 25# x 20; Y PW x 20 HG 35# x 20     Pinch strength   Removing CP from horizontal poles                     Ther Activity        Rolling FB for digit ext x20 x20 x20     Opposition Foam cubes Foam cubes, golf ashutosh peg board Golf ashutosh pegs, marbles 1 set each     Gross grasp Cones 1 set Macaroni transfer 1 set              HEP POC; rolling and grasping water bottle;        Picking up popsicle sticks               Modalities        MHP 5' 5' 5'

## 2023-12-22 ENCOUNTER — OFFICE VISIT (OUTPATIENT)
Dept: OCCUPATIONAL THERAPY | Facility: CLINIC | Age: 58
End: 2023-12-22
Payer: MEDICARE

## 2023-12-22 DIAGNOSIS — S62.641D CLOSED NONDISPLACED FRACTURE OF PROXIMAL PHALANX OF LEFT INDEX FINGER WITH ROUTINE HEALING, SUBSEQUENT ENCOUNTER: Primary | ICD-10-CM

## 2023-12-22 PROCEDURE — 97110 THERAPEUTIC EXERCISES: CPT

## 2023-12-22 PROCEDURE — 97140 MANUAL THERAPY 1/> REGIONS: CPT

## 2023-12-22 PROCEDURE — 97530 THERAPEUTIC ACTIVITIES: CPT

## 2023-12-22 NOTE — PROGRESS NOTES
Daily Note     Today's date: 2023  Patient name: Jairon Oconnell  : 1965  MRN: [id number]  Referring provider: Gilles Minor MD  Dx:   Encounter Diagnosis     ICD-10-CM    1. Closed nondisplaced fracture of proximal phalanx of left index finger with routine healing, subsequent encounter  S62.641D                      Subjective: When asked if he had pain in the finger, the patient said no.      Objective: See treatment diary below      Assessment: Tolerated treatment well. Patient would benefit from continued OT. Fabricated and issued night time static LIF PIP extension splint      Plan: Continue per plan of care.      Precautions HTN; DM; Intellectual disability. DOI 10/19/23         POC expires Unit limit Auth  expiration date PT/OT + Visit Limit?   3/8/24 NA 3/8/23 BOMN                 Visit/Unit Tracking  AUTH Status:  Date 12/12 12/15 12/19 12/22           BOMN. Re due 24 Used 1 2 3 4 FOT0           Remaining                     Date 12/12/23 12/15/23 12/19/23 12/22/23    Visit  1 2 3 4    Manuals        Edema massage 8' 8' 5' 5'    Jt mobs LIF MP, PIP   3' 5'                    Ortho fit/train    5'    Neuro Re-Ed                                                                 Ther Ex        AAROM LIF x20 10' PROM 10' PROM 10' PROM    Blocking LIF  X20 DIP, PIP X20 IPs X10 ea    Digit abd/add  x20 x20     Active fisting x10 x20 x20 x20     strength  HG 25# x 20; Y PW x 20 HG 35# x 20 HG 35# x 20    Pinch strength   Removing CP from horizontal poles Y, R,G CP on and off horizontal bars    FB palm up, down    R x 10 ea            Ther Activity        Rolling FB for digit ext x20 x20 x20 x20    Opposition Foam cubes Foam cubes, golf ashutosh peg board Golf ashutosh pegs, marbles 1 set each Foam cubes    Gross grasp Cones 1 set Macaroni transfer 1 set              HEP POC; rolling and grasping water bottle;   Aide educated about splint wear, care, precautions     Picking up popsicle sticks                Modalities        [facility] 5' 5' 5' 5'

## 2023-12-26 ENCOUNTER — OFFICE VISIT (OUTPATIENT)
Dept: OCCUPATIONAL THERAPY | Facility: CLINIC | Age: 58
End: 2023-12-26
Payer: MEDICARE

## 2023-12-26 ENCOUNTER — TELEPHONE (OUTPATIENT)
Dept: OCCUPATIONAL THERAPY | Facility: CLINIC | Age: 58
End: 2023-12-26

## 2023-12-26 DIAGNOSIS — S62.641D CLOSED NONDISPLACED FRACTURE OF PROXIMAL PHALANX OF LEFT INDEX FINGER WITH ROUTINE HEALING, SUBSEQUENT ENCOUNTER: Primary | ICD-10-CM

## 2023-12-26 PROCEDURE — 97110 THERAPEUTIC EXERCISES: CPT

## 2023-12-26 PROCEDURE — 97530 THERAPEUTIC ACTIVITIES: CPT

## 2023-12-26 PROCEDURE — 97140 MANUAL THERAPY 1/> REGIONS: CPT

## 2023-12-26 NOTE — PROGRESS NOTES
Daily Note     Today's date: 2023  Patient name: Jairon Oconnell  : 1965  MRN: [id number]  Referring provider: Gilles Minor MD  Dx:   Encounter Diagnosis     ICD-10-CM    1. Closed nondisplaced fracture of proximal phalanx of left index finger with routine healing, subsequent encounter  S62.641D                      Subjective: When asked if he had pain, patient reported yes. He reports pain over proximal phalanx and PIP joint      Objective: See treatment diary below      Assessment: Tolerated treatment well. Patient would benefit from continued OT. Patient continues to present with a moderate extension lag at LIF PIP joint.      Plan: Continue per plan of care.      Precautions HTN; DM; Intellectual disability. DOI 10/19/23         POC expires Unit limit Auth  expiration date PT/OT + Visit Limit?   3/8/24 NA 3/8/23 BOMN                 Visit/Unit Tracking  AUTH Status:  Date 12/12 12/15 12/19 12/22 12/26 12/29         BOMN. Re due 24 Used 1 2 3 4 5  FOTO          Remaining                     Date 12/12/23 12/15/23 12/19/23 12/22/23 12/26/23   Visit  1 2 3 4 5   Manuals        Edema massage 8' 8' 5' 5' 7'   Jt mobs LIF MP, PIP   3' 5' 8'                   Ortho fit/train    5'    Neuro Re-Ed                                                                 Ther Ex        AAROM LIF x20 10' PROM 10' PROM 10' PROM x10   Blocking LIF  X20 DIP, PIP X20 IPs X10 ea X20 IPs   Digit abd/add  x20 x20     Active fisting x10 x20 x20 x20 x10    strength  HG 25# x 20; Y PW x 20 HG 35# x 20 HG 35# x 20 HG 35# x20   Pinch strength   Removing CP from horizontal poles Y, R,G CP on and off horizontal bars Y/R/G CP on horizontal bars   FB palm up, down    R x 10 ea R x 10 ea           Ther Activity        Rolling FB for digit ext x20 x20 x20 x20    Opposition Foam cubes Foam cubes, golf ashutosh peg board Golf ashutosh pegs, marbles 1 set each Foam cubes Foam cubes   Gross grasp Cones 1 set Macaroni transfer 1 set               HEP POC; rolling and grasping water bottle;   Aide educated about splint wear, care, precautions Provided with education handout for splint    Picking up popsicle sticks               Modalities        P 5' 5' 5' 5' 5'

## 2023-12-26 NOTE — TELEPHONE ENCOUNTER
Called  phone number to confirm if Jairon was coming to scheduled appointment. Requested to reschedule for 3:30 pm today. Patient rescheduled for 3:30 pm today.  Hector Leiva, OT

## 2023-12-29 ENCOUNTER — OFFICE VISIT (OUTPATIENT)
Dept: OCCUPATIONAL THERAPY | Facility: CLINIC | Age: 58
End: 2023-12-29
Payer: MEDICARE

## 2023-12-29 DIAGNOSIS — S62.641D CLOSED NONDISPLACED FRACTURE OF PROXIMAL PHALANX OF LEFT INDEX FINGER WITH ROUTINE HEALING, SUBSEQUENT ENCOUNTER: Primary | ICD-10-CM

## 2023-12-29 PROCEDURE — 97110 THERAPEUTIC EXERCISES: CPT

## 2023-12-29 PROCEDURE — 97140 MANUAL THERAPY 1/> REGIONS: CPT

## 2023-12-29 PROCEDURE — 97530 THERAPEUTIC ACTIVITIES: CPT

## 2023-12-29 NOTE — PROGRESS NOTES
Daily Note     Today's date: 2023  Patient name: Jairon Oconnell  : 1965  MRN: [id number]  Referring provider: Gilles Minor MD  Dx:   Encounter Diagnosis     ICD-10-CM    1. Closed nondisplaced fracture of proximal phalanx of left index finger with routine healing, subsequent encounter  S62.641D                       Subjective: Patient reports pain over PIP jt of LIF.      Objective: See treatment diary below      Assessment: Tolerated treatment well. Patient would benefit from continued OT. Able to achieve increased PIP flexion after MHP, PIP/MP jt mobilizations, and passive stretching.     Plan: Continue per plan of care. Progress note next visit.     Precautions HTN; DM; Intellectual disability. DOI 10/19/23         POC expires Unit limit Auth  expiration date PT/OT + Visit Limit?   3/8/24 NA 3/8/23 BOMN                 Visit/Unit Tracking  AUTH Status:  Date 12/12 12/15 12/19 12/22 12/26 12/29         BOMN. Re due 24 Used 1 2 3 4 5  6 FOTO RE         Remaining                     Date 12/29/23 12/15/23 12/19/23 12/22/23 12/26/23   Visit  6 FOTO 2 3 4 5   Manuals        Edema massage 8' 8' 5' 5' 7'   Jt mobs LIF MP, PIP 7'  3' 5' 8'                   Ortho fit/train    5'    Neuro Re-Ed                                                                 Ther Ex        AAROM LIF 10' PROM 10' PROM 10' PROM 10' PROM x10   Blocking LIF x20 PIP, DIP X20 DIP, PIP X20 IPs X10 ea X20 IPs   Digit abd/add  x20 x20     Active fisting x20 x20 x20 x20 x10    strength HG 25# x20 HG 25# x 20; Y PW x 20 HG 35# x 20 HG 35# x 20 HG 35# x20   Pinch strength R and G CP on horizontal bars  Removing CP from horizontal poles Y, R,G CP on and off horizontal bars Y/R/G CP on horizontal bars   FB palm up, down R x 10 palm up   R x 10 ea R x 10 ea           Ther Activity        Rolling FB for digit ext x20 x20 x20 x20    Opposition Foam cubes Foam cubes, golf ashutosh peg board Golf ashutosh pegs, marbles 1 set each Foam cubes  Foam cubes   Gross grasp  Macaroni transfer 1 set              HEP    Aide educated about splint wear, care, precautions Provided with education handout for splint                   Modalities        [facility] 5' 5' 5' 5' 5'

## 2024-01-02 ENCOUNTER — EVALUATION (OUTPATIENT)
Dept: OCCUPATIONAL THERAPY | Facility: CLINIC | Age: 59
End: 2024-01-02
Payer: MEDICARE

## 2024-01-02 DIAGNOSIS — S62.641D CLOSED NONDISPLACED FRACTURE OF PROXIMAL PHALANX OF LEFT INDEX FINGER WITH ROUTINE HEALING, SUBSEQUENT ENCOUNTER: Primary | ICD-10-CM

## 2024-01-02 PROCEDURE — 97110 THERAPEUTIC EXERCISES: CPT

## 2024-01-02 PROCEDURE — 97140 MANUAL THERAPY 1/> REGIONS: CPT

## 2024-01-02 PROCEDURE — 97530 THERAPEUTIC ACTIVITIES: CPT

## 2024-01-02 NOTE — PROGRESS NOTES
"OT Re-Evaluation    Today's date: 2024  Patient name: Jairon Oconnell  : 1965  MRN: [id number]  Referring provider: Gilles Minor MD  Dx:   Encounter Diagnosis     ICD-10-CM    1. Closed nondisplaced fracture of proximal phalanx of left index finger with routine healing, subsequent encounter  S62.641D             Start Time: 1015  Stop Time: 1100  Total time in clinic (min): 45 minutes    Assessment  Assessment details: 23: Jairon Oconnell is a 58 y.o., Right HD male referred to hand therapy for a LIF proximal phalanx fracture.  Onset of injury 10/19/23 due to fall.  Patient presents 23 with impaired ROM, strength of the LIF and hand.  Deficits also noted in pain, edema and functional use of the left UE. Patient has intellectual disability and lives in a group home. Patient is a good candidate for OT services to abolish pain and edema and restore ROM, strength, coordination for a return to independence in daily tasks.    24: Patient continues to present with limitations in ROM of LIF but has made improvements in AROM of L wrist and is now WFL. Caregiver reports that patient removes splint at night. Discussed options for using softer and more comfortable material. Caregiver reports that he will likely remove it either way. Edema in LIF may be limiting his AROM of LIF. Will continue to focus on PIP/MP joint mobilization, edema massage, and AROM to maximize ROM of LIF.    Impairments: abnormal coordination, abnormal or restricted ROM, activity intolerance, impaired physical strength, lacks appropriate home exercise program and pain with function  Other impairment: Intellectual disability  Functional limitations: Not using LIF for grapsing during daily tasks  Symptom irritability: moderateUnderstanding of Dx/Px/POC: excellent   Prognosis: good    Goals  STGs (4 weeks)  Patient will be independent in HEP of ROM, edema management MET  Patient will report \"low\" pain level IN PROGRESS  Patient " will demonstrate 20 degree improvement in KING of the LIF NOT MET  Patient will demonstrate active wrist extension/flexion to WFL MET  LTGs (12 weeks)  Patient will demonstrate independence in a HEP to maintain ROM, strength, and function at discharge NOT MET  Patient will report resolution of pain to be independent in daily tasks NOT MET  Patient will demonstrate KING of the LIF to 210 degrees to be independent in self care tasks NOT MET  Patient will demonstrate AROM of the wrist and forearm WFL to be independent in self care tasks MET  Patient will demonstrate 5/5 muscle strength in the wrist and forearm to be MI for meal prep NOT MET  Patient will demonstrate left  strength within 30% of the right hand to be MI for IADL tasks IN PROGRESS  Patient will demonstrate resolution of edema IN PROGRESS      Plan  Plan details: 12/12/23: Begin OT 2x/wk x 12 weeks  1/2/24: Continue OT 2x/week 4-6 weeks  Patient would benefit from: skilled occupational therapy and custom splinting  Planned modality interventions: ultrasound, thermotherapy: hydrocollator packs and cryotherapy  Planned therapy interventions: activity modification, compression, fine motor coordination training, graded activity, graded exercise, home exercise program, therapeutic exercise, therapeutic activities, stretching, strengthening, patient education, orthotic fitting/training, neuromuscular re-education, manual therapy, IASTM, joint mobilization, kinesiology taping, massage and muscle pump exercises  Frequency: 2x week  Duration in weeks: 12  Plan of Care beginning date: 12/12/2023  Plan of Care expiration date: 3/8/2024  Treatment plan discussed with: patient        Subjective Evaluation    History of Present Illness  Date of onset: 10/19/2023  Mechanism of injury: 12/12/23: This is a 58-year-old male presenting who reports he fell down the driveway going to the car and landed on his L hand  Seen in ER 10/20/23.  XR done and patient put in a splint  "and referred to orthopedics. Patient had a closed reduction. Splint has been discharged and patient now presents for OT evaluation and treatment of left hand pain and stiffness    1/2/24: Patient continues to report pain on volar surface of P1 of LIF. As per caregiver report, patient doffs PIP extension splint during nighttime and has not been wearing it as much.    Patient Goals  Patient goals for therapy: decreased edema, decreased pain, increased motion, increased strength and independence with ADLs/IADLs    Pain  Location: Patient unable to rate pain level due to intelectual impairment, but reports \" alot\" of pain in LIF on voar surface    Social Support  Lives with: Lives in group home.      Diagnostic Tests  X-ray: abnormal (healing fracture)  Treatments  Previous treatment: immobilization  Current treatment: occupational therapy      Objective     Observations     Left Wrist/Hand   Positive for deformity and edema.     Additional Observation Details  12/12/23: LIF PIP flexion contracture  1/2/24: LIF PIP flexion contracture    Tenderness     Additional Tenderness Details  12/12/23: Tender with palpation of PIP jt LIF    Neurological Testing     Sensation     Wrist/Hand   Left   Intact: light touch    Active Range of Motion     Left Wrist   Wrist flexion: 67 (37 degree improvement) degrees   Wrist extension: 64 (4 dgeree improvement) degrees     Left Thumb     Opposition: 12/12/23: Grossly intact  1/2/24: WNL    Left Digits    Flexion   Index     MCP: 55    PIP: 80    DIP: 35  Extension   Index     PIP: -50    DIP: -20  Index: 100 degrees    Additional Active Range of Motion Details  12/12/23: digits 3-5 WNL.  1/2/24: digits 3-5 WNL    Passive Range of Motion     Left Digits   Flexion   Index     MCP: 65    PIP: 80    DIP: 65  Extension   Index     PIP: -25    DIP: -10  Index: 180 degrees    Additional Passive Range of Motion Details  1/2/24: Improvements in wrist flexion and LIF PIP flexion AROM    Swelling "     Left Wrist/Hand   Index     Proximal: 8 cm    Middle: 6 cm    Distal: 4.7 cm    Right Wrist/Hand   Index     Proximal: 7 cm    Middle: 5.5 cm    Distal: 5 cm             Precautions HTN; DM; Intellectual disability. DOI 10/19/23          POC expires Unit limit Auth  expiration date PT/OT + Visit Limit?   3/8/24 NA 3/8/23 BOMN                          Visit/Unit Tracking  AUTH Status:  Date 12/12 12/15 12/19 12/22 12/26 12/29  1/2             BOMN. Re due 1/5/24 Used 1 2 3 4 5  6 FOTO 7 RE               Remaining                                  Date 12/29/23 1/2/24 12/19/23 12/22/23 12/26/23   Visit  6 FOTO 7 RE 3 4 5   Manuals             Edema massage 8' 8' 5' 5' 7'   Jt mobs LIF MP, PIP 7'  7' 3' 5' 8'                               Ortho fit/train       5'     Neuro Re-Ed                                                                                                                Ther Ex             AAROM LIF 10' PROM 8' PROM 10' PROM 10' PROM x10   Blocking LIF x20 PIP, DIP  X20 IPs X10 ea X20 IPs   Digit abd/add    x20       Active fisting x20 x20 x20 x20 x10    strength HG 25# x20 HG 25# x 30 HG 35# x 20 HG 35# x 20 HG 35# x20   Pinch strength R and G CP on horizontal bars  R and G CP on and off horizontal bars Removing CP from horizontal poles Y, R,G CP on and off horizontal bars Y/R/G CP on horizontal bars   FB palm up, down R x 10 palm up     R x 10 ea R x 10 ea                 Ther Activity             Rolling FB for digit ext x20  x20 x20     Opposition Foam cubes Foam cubes Golf ashutosh pegs, marbles 1 set each Foam cubes Foam cubes   Gross grasp   Macaroni transfer 1 set                       HEP       Aide educated about splint wear, care, precautions Provided with education handout for splint                               Modalities             MHP 5' 5' 5' 5' 5'

## 2024-01-05 ENCOUNTER — OFFICE VISIT (OUTPATIENT)
Dept: OBGYN CLINIC | Facility: CLINIC | Age: 59
End: 2024-01-05
Payer: MEDICARE

## 2024-01-05 VITALS
HEIGHT: 72 IN | BODY MASS INDEX: 20.86 KG/M2 | HEART RATE: 77 BPM | WEIGHT: 154 LBS | SYSTOLIC BLOOD PRESSURE: 118 MMHG | DIASTOLIC BLOOD PRESSURE: 57 MMHG

## 2024-01-05 DIAGNOSIS — S62.641D CLOSED NONDISPLACED FRACTURE OF PROXIMAL PHALANX OF LEFT INDEX FINGER WITH ROUTINE HEALING, SUBSEQUENT ENCOUNTER: Primary | ICD-10-CM

## 2024-01-05 PROCEDURE — 99213 OFFICE O/P EST LOW 20 MIN: CPT | Performed by: FAMILY MEDICINE

## 2024-01-05 NOTE — PROGRESS NOTES
[facility] [address] [address] [phone]      Assessment:  1. Closed nondisplaced fracture of proximal phalanx of left index finger with routine healing, subsequent encounter        Plan:  Patient Instructions   F/u 4 wks  Continue therapy  Wear night brace.   Return in about 4 weeks (around 2/2/2024) for Recheck.    Chief Complaint:  Chief Complaint   Patient presents with    Left Hand - Follow-up       Subjective:   HPI    Patient ID: Jairon Oconnell is a 58 y.o. male     Here with caregiver for f/u  L 2nd prox phalanx fx  No pain  No complaints  Going to OT  Given night brace from therapy sometimes wears it  No pain meds    Review of Systems   Constitutional:  Negative for fatigue and fever.   Respiratory:  Negative for shortness of breath.    Cardiovascular:  Negative for chest pain.   Gastrointestinal:  Negative for abdominal pain and nausea.   Genitourinary:  Negative for dysuria.   Skin:  Negative for rash and wound.   Neurological:  Negative for weakness and headaches.       Objective:  Vitals:  /57 (BP Location: Right arm, Patient Position: Sitting, Cuff Size: Standard)   Pulse 77   Ht 6' (1.829 m)   Wt 69.9 kg (154 lb)   BMI 20.89 kg/m²     The following portions of the patient's history were reviewed and updated as appropriate: allergies, current medications, past family history, past medical history, past social history, past surgical history, and problem list.    Physical exam:  Physical Exam  Constitutional:       Appearance: Normal appearance. He is normal weight.   Eyes:      Extraocular Movements: Extraocular movements intact.   Pulmonary:      Effort: Pulmonary effort is normal.   Musculoskeletal:         General: No tenderness.      Cervical back: Normal range of motion.      Comments: L hand-  2nd prox phalanx no TTP, pain with grasping/hand squeeze- extensor deformity  Dec flexion at MCP/PIP  joint.   Skin:     General: Skin is warm and dry.   Neurological:      General: No focal deficit present.      Mental Status: He is alert and oriented to person, place, and time. Mental status is at baseline.   Psychiatric:         Mood and Affect: Mood normal.         Behavior: Behavior normal.         Thought Content: Thought content normal.         Judgment: Judgment normal.       Ortho Exam          Gilles Minor MD

## 2024-01-09 ENCOUNTER — OFFICE VISIT (OUTPATIENT)
Dept: OCCUPATIONAL THERAPY | Facility: CLINIC | Age: 59
End: 2024-01-09
Payer: MEDICARE

## 2024-01-09 DIAGNOSIS — S62.641D CLOSED NONDISPLACED FRACTURE OF PROXIMAL PHALANX OF LEFT INDEX FINGER WITH ROUTINE HEALING, SUBSEQUENT ENCOUNTER: Primary | ICD-10-CM

## 2024-01-09 PROCEDURE — 97140 MANUAL THERAPY 1/> REGIONS: CPT

## 2024-01-09 PROCEDURE — 97530 THERAPEUTIC ACTIVITIES: CPT

## 2024-01-09 PROCEDURE — 97110 THERAPEUTIC EXERCISES: CPT

## 2024-01-09 NOTE — PROGRESS NOTES
Daily Note     Today's date: 2024  Patient name: Jairon Oconnell  : 1965  MRN: [id number]  Referring provider: Gilles Minor MD  Dx:   Encounter Diagnosis     ICD-10-CM    1. Closed nondisplaced fracture of proximal phalanx of left index finger with routine healing, subsequent encounter  S62.641D           Start Time: 1015  Stop Time: 1100  Total time in clinic (min): 45 minutes    Subjective: Patient reports pain on volar surface of P1 of LIF      Objective: See treatment diary below      Assessment: Tolerated treatment well. Patient would benefit from continued OT. Patient continues to present with moderate PIP flexion contracture. Able to almost achieve full passive flexion of LIF after heat and stretching.      Plan: Continue per plan of care.  Progress treatment as tolerated.       Precautions HTN; DM; Intellectual disability. DOI 10/19/23          POC expires Unit limit Auth  expiration date PT/OT + Visit Limit?   3/8/24 NA 3/8/23 BOMN                          Visit/Unit Tracking  AUTH Status:  Date 12/12 12/15 12/19 12/22 12/26 12/29  1/2  1/9           BOMN. Re due 24 Used 1 2 3 4 5  6 FOTO 7 RE  8             Remaining                                  Date 23   Visit  6 FOTO 7 RE 8 4 5   Manuals             Edema massage 8' 8' 5' 5' 7'   Jt mobs LIF MP, PIP 7'  7' 5' 5' 8'                               Ortho fit/train       5'     Neuro Re-Ed                                                                                                                Ther Ex             AAROM LIF 10' PROM 8' PROM 10' PROM 10' PROM x10   Blocking LIF x20 PIP, DIP     X20 IPs   Digit abd/add            Active fisting x20 x20 x20 x20 x10    strength HG 25# x20 HG 25# x 30 HG 35# x 20 HG 35# x 20 HG 35# x20   Pinch strength R and G CP on horizontal bars  R and G CP on and off horizontal bars R and G CP on and off horizontal bars  Y/R/G CP on horizontal bars   FB palm  up, down R x 10 palm up     R x 10 palm up R x 10 ea                 Ther Activity             Rolling FB for digit ext x20   x20 x20     Opposition Foam cubes Foam cubes Foam cubes Foam cubes Foam cubes   Gross grasp   Macaroni transfer 1 set                       HEP        Provided with education handout for splint                               Modalities             [facility] 5' 5' 5' 5' 5'

## 2024-01-12 ENCOUNTER — OFFICE VISIT (OUTPATIENT)
Dept: OCCUPATIONAL THERAPY | Facility: CLINIC | Age: 59
End: 2024-01-12
Payer: MEDICARE

## 2024-01-12 DIAGNOSIS — S62.641D CLOSED NONDISPLACED FRACTURE OF PROXIMAL PHALANX OF LEFT INDEX FINGER WITH ROUTINE HEALING, SUBSEQUENT ENCOUNTER: Primary | ICD-10-CM

## 2024-01-12 PROCEDURE — 97140 MANUAL THERAPY 1/> REGIONS: CPT

## 2024-01-12 PROCEDURE — 97110 THERAPEUTIC EXERCISES: CPT

## 2024-01-12 PROCEDURE — 97530 THERAPEUTIC ACTIVITIES: CPT

## 2024-01-12 NOTE — PROGRESS NOTES
Daily Note     Today's date: 2024  Patient name: Jairon Oconnell  : 1965  MRN: [id number]  Referring provider: Gilles Minor MD  Dx:   Encounter Diagnosis     ICD-10-CM    1. Closed nondisplaced fracture of proximal phalanx of left index finger with routine healing, subsequent encounter  S62.641D           Start Time: 0940  Stop Time: 1025  Total time in clinic (min): 45 minutes    Subjective: Patient continues to report pain on volar surface of LIF at P2 and P1      Objective: See treatment diary below      Assessment: Tolerated treatment well. Patient would benefit from continued OT. Patient continues to present with PIP flexion contracture. During foam cube opposition activity he appears to have better coordination and prehension. Progressed  and pinch strengthening exercises and patient tolerated well.      Plan: Continue per plan of care.      Precautions HTN; DM; Intellectual disability. DOI 10/19/23          POC expires Unit limit Auth  expiration date PT/OT + Visit Limit?   3/8/24 NA 3/8/23 BOMN                          Visit/Unit Tracking  AUTH Status:  Date 12/12 12/15 12/19 12/22 12/26 12/29  1/2  1/9  1/12         BOMN. Re due 24 Used 1 2 3 4 5  6 FOTO 7 RE  8  9           Remaining                                  Date 23   Visit  6 FOTO 7 RE 8 9 5   Manuals            Edema massage 8' 8' 5' 5' 7'   Jt mobs LIF MP, PIP 7'  7' 5' 5' 8'                             Ortho fit/train            Neuro Re-Ed                                                                                                        Ther Ex            AAROM LIF 10' PROM 8' PROM 10' PROM 10' PROM x10   Blocking LIF x20 PIP, DIP     X20 IPs   Digit abd/add           Active fisting x20 x20 x20 x30 x10    strength HG 25# x20 HG 25# x 30 HG 35# x 20 HG 55# x 20 HG 35# x20   Pinch strength R and G CP on horizontal bars  R and G CP on and off horizontal bars R and G CP on and off  horizontal bars G and B CP on and off horizontal bars  Y/R/G CP on horizontal bars   FB palm up, down R x 10 palm up     R x 10 ea R x 10 ea                Ther Activity            Rolling FB for digit ext x20   x20 x20     Opposition Foam cubes Foam cubes Foam cubes Foam cubes Foam cubes   Gross grasp   Macaroni transfer 1 set                     HEP        Provided with education handout for splint                             Modalities            [facility] 5' 5' 5' 5' 5'

## 2024-01-18 ENCOUNTER — OFFICE VISIT (OUTPATIENT)
Dept: OCCUPATIONAL THERAPY | Facility: CLINIC | Age: 59
End: 2024-01-18
Payer: MEDICARE

## 2024-01-18 DIAGNOSIS — S62.641D CLOSED NONDISPLACED FRACTURE OF PROXIMAL PHALANX OF LEFT INDEX FINGER WITH ROUTINE HEALING, SUBSEQUENT ENCOUNTER: Primary | ICD-10-CM

## 2024-01-18 PROCEDURE — 97530 THERAPEUTIC ACTIVITIES: CPT

## 2024-01-18 PROCEDURE — 97110 THERAPEUTIC EXERCISES: CPT

## 2024-01-18 PROCEDURE — 97140 MANUAL THERAPY 1/> REGIONS: CPT

## 2024-01-18 NOTE — PROGRESS NOTES
"Daily Note     Today's date: 2024  Patient name: Jairon Oconnell  : 1965  MRN: [id number]  Referring provider: Gilles Minor MD  Dx:   Encounter Diagnosis     ICD-10-CM    1. Closed nondisplaced fracture of proximal phalanx of left index finger with routine healing, subsequent encounter  S62.641D           Start Time: 1215  Stop Time: 1300  Total time in clinic (min): 45 minutes    Subjective: Patient reports pain on volar surface of P2 of LIF      Objective: See treatment diary below      Assessment: Tolerated treatment well. Patient exhibited good technique with therapeutic exercises and would benefit from continued OT. Patient stated \"that feels good\" after rolling flex bar for digit extension. He is continuing to have pain on volar aspect of PIP joint and P2. He is able to achieve greater composite flexion of LIF after heat and passive stretching.      Plan: Continue per plan of care.      Precautions HTN; DM; Intellectual disability. DOI 10/19/23          POC expires Unit limit Auth  expiration date PT/OT + Visit Limit?   3/8/24 NA 3/8/23 BOMN                          Visit/Unit Tracking  AUTH Status:  Date 12/12 12/15 12/19 12/22 12/26 12/29  1/2  1/9  1/12  1/18       BOMN. Re due 24 Used 1 2 3 4 5  6 FOTO 7 RE  8  9  10         Remaining                                  Date 23   Visit  6 FOTO 7 RE 8 9 10   Manuals            Edema massage 8' 8' 5' 5' 5'   Jt mobs LIF MP, PIP 7'  7' 5' 5' 5'                           Ortho fit/train           Neuro Re-Ed                                                                                                Ther Ex           AAROM LIF 10' PROM 8' PROM 10' PROM 10' PROM 10' PROM   Blocking LIF x20 PIP, DIP        Digit abd/add          Active fisting x20 x20 x20 x30 x20    strength HG 25# x20 HG 25# x 30 HG 35# x 20 HG 55# x 20 HG 55# x20   Pinch strength R and G CP on horizontal bars  R and G CP on and off " horizontal bars R and G CP on and off horizontal bars G and B CP on and off horizontal bars  G and CP on bars   FB palm up, down R x 10 palm up     R x 10 ea R x 10 ea               Ther Activity           Rolling FB for digit ext x20   x20 x20 x20   Opposition Foam cubes Foam cubes Foam cubes Foam cubes Foam cubes   Gross grasp   Macaroni transfer 1 set                   HEP                                   Modalities            [facility] 5' 5' 5' 5' 5'

## 2024-01-23 ENCOUNTER — APPOINTMENT (OUTPATIENT)
Dept: OCCUPATIONAL THERAPY | Facility: CLINIC | Age: 59
End: 2024-01-23
Payer: MEDICARE

## 2024-01-24 ENCOUNTER — OFFICE VISIT (OUTPATIENT)
Dept: NEUROLOGY | Facility: CLINIC | Age: 59
End: 2024-01-24
Payer: MEDICARE

## 2024-01-24 ENCOUNTER — OFFICE VISIT (OUTPATIENT)
Dept: OCCUPATIONAL THERAPY | Facility: CLINIC | Age: 59
End: 2024-01-24
Payer: MEDICARE

## 2024-01-24 VITALS
SYSTOLIC BLOOD PRESSURE: 122 MMHG | HEIGHT: 72 IN | DIASTOLIC BLOOD PRESSURE: 80 MMHG | HEART RATE: 74 BPM | BODY MASS INDEX: 21.37 KG/M2 | WEIGHT: 157.8 LBS | TEMPERATURE: 98 F

## 2024-01-24 DIAGNOSIS — R25.9 INVOLUNTARY MOVEMENTS: ICD-10-CM

## 2024-01-24 DIAGNOSIS — S62.641D CLOSED NONDISPLACED FRACTURE OF PROXIMAL PHALANX OF LEFT INDEX FINGER WITH ROUTINE HEALING, SUBSEQUENT ENCOUNTER: Primary | ICD-10-CM

## 2024-01-24 DIAGNOSIS — G80.9 CEREBRAL PALSY (HCC): ICD-10-CM

## 2024-01-24 DIAGNOSIS — G40.309 GENERALIZED CONVULSIVE EPILEPSY (HCC): Chronic | ICD-10-CM

## 2024-01-24 PROCEDURE — 97140 MANUAL THERAPY 1/> REGIONS: CPT

## 2024-01-24 PROCEDURE — 97110 THERAPEUTIC EXERCISES: CPT

## 2024-01-24 PROCEDURE — 97530 THERAPEUTIC ACTIVITIES: CPT

## 2024-01-24 PROCEDURE — 99214 OFFICE O/P EST MOD 30 MIN: CPT | Performed by: PSYCHIATRY & NEUROLOGY

## 2024-01-24 RX ORDER — LACOSAMIDE 150 MG/1
TABLET ORAL
Qty: 30 TABLET | Refills: 5 | Status: ON HOLD | OUTPATIENT
Start: 2024-01-24

## 2024-01-24 RX ORDER — LEVETIRACETAM 750 MG/1
TABLET ORAL
Qty: 120 TABLET | Refills: 11 | Status: ON HOLD | OUTPATIENT
Start: 2024-01-24

## 2024-01-24 RX ORDER — DIVALPROEX SODIUM 250 MG
250 TABLET, EXTENDED RELEASE 24 HR ORAL DAILY
Qty: 90 TABLET | Refills: 1 | Status: ON HOLD | OUTPATIENT
Start: 2024-01-24

## 2024-01-24 RX ORDER — DIVALPROEX SODIUM 500 MG
TABLET, DELAYED RELEASE (ENTERIC COATED) ORAL
Qty: 90 TABLET | Refills: 11 | Status: ON HOLD | OUTPATIENT
Start: 2024-01-24

## 2024-01-24 RX ORDER — LACOSAMIDE 200 MG/1
TABLET ORAL
Qty: 30 TABLET | Refills: 5 | Status: ON HOLD | OUTPATIENT
Start: 2024-01-24

## 2024-01-24 NOTE — PROGRESS NOTES
Patient ID: Jairon Oconnell is a 58 y.o. male with cerebral palsy, generalized epilepsy, and behavioral disorder , who is returning to Neurology office for follow up of his seizures.       Assessment/Plan:    Generalized convulsive epilepsy (HCC)  He has not had any additional seizures since his last appointment.  His caregiver denies any clear myoclonus at this point.  He appears to be doing well since getting back on higher doses of zonisamide with his Depakote.  Although he still is taking levetiracetam and lacosamide and may not need to be on 4 medications, because he is doing well, his caregiver prefer to continue his medicines unchanged.  In the future we could consider decreasing his dose of levetiracetam.    --He will continue his medications unchanged.  If he would have additional events concerning for seizures or return of his myoclonus, his dose of zonisamide could be increased.    --Down the road, we could consider trying to simplify his regimen by decreasing levetiracetam, then possibly also lacosamide    Cerebral palsy (HCC)  He continues to have difficulty with behavioral problems, and does follow with psychiatry for ongoing management.  He is quite impulsive, and sometimes does things for attention.  He has not had any clear falls and overall his caregiver feels that he has been doing better.  He continues to have significant difficulty with gait imbalance, but this has not worsened over time        He will Return in about 3 months (around 4/24/2024).      Subjective:    HPI  Current seizure medications:  1. zonisamide 400 mg HS  2. levetiracetam 1500 mg twice per day  3. lacosamide 150 mg in the morning and 200 mg at night.   4. gabapentin 300 mg three times per day  5. Depakote  mg in the morning and 1000 mg at night (BRAND ONLY)  6. clonazepam 0.25 mg PRN  Other medications as per Epic.    Since his last visit, they did increase his Zonisamide up to 400 mg nightly, but did not decrease his  Lacosamide. His caregiver denies any additional seizures and denies seeing any problems with myoclonus since his last appointment.     He has still had some falls. Most of this is due to being impulsive and getting up really quickly. His caregiver hasn't seen any times where he appeared to have a fall from myoclonus.    His caregiver does feel that his balance is a little better than it was in the past.     Prior Seizure Medications: none other than current medications    His history was also obtained from his caregiver, who was present at today's visit.        Objective:    Blood pressure 122/80, pulse 74, temperature 98 °F (36.7 °C), temperature source Temporal, height 6' (1.829 m), weight 71.6 kg (157 lb 12.8 oz).    Physical Exam    Neurological Exam      ROS:    Review of Systems   Constitutional:  Negative for appetite change, fatigue and fever.   HENT: Negative.  Negative for hearing loss, tinnitus, trouble swallowing and voice change.    Eyes: Negative.  Negative for photophobia, pain and visual disturbance.   Respiratory: Negative.  Negative for shortness of breath.    Cardiovascular: Negative.  Negative for palpitations.   Gastrointestinal: Negative.  Negative for nausea and vomiting.   Endocrine: Negative.  Negative for cold intolerance.   Genitourinary: Negative.  Negative for dysuria, frequency and urgency.   Musculoskeletal:  Negative for back pain, gait problem, myalgias, neck pain and neck stiffness.   Skin: Negative.  Negative for rash.   Allergic/Immunologic: Negative.    Neurological: Negative.  Negative for dizziness, tremors, seizures, syncope, facial asymmetry, speech difficulty, weakness, light-headedness, numbness and headaches.   Hematological: Negative.  Does not bruise/bleed easily.   Psychiatric/Behavioral: Negative.  Negative for confusion, hallucinations and sleep disturbance.    All other systems reviewed and are negative.      I personally reviewed the ROS that was entered by the  medical assistant    Voice recognition software was used in the generation of this note. There may be unintentional errors including grammatical errors, spelling errors, or pronoun errors.

## 2024-01-24 NOTE — PROGRESS NOTES
Daily Note     Today's date: 2024  Patient name: Jairon Oconnell  : 1965  MRN: [id number]  Referring provider: Gilles Minor MD  Dx:   Encounter Diagnosis     ICD-10-CM    1. Closed nondisplaced fracture of proximal phalanx of left index finger with routine healing, subsequent encounter  S62.641D           Start Time: 0940  Stop Time: 1025  Total time in clinic (min): 45 minutes    Subjective: Patient continues to report pain on volar surface of P2 of LIF      Objective: See treatment diary below      Assessment: Tolerated treatment well. Patient would benefit from continued OT. Patient has improved pinch strength and did well with more challenging fine motor activities today. He continues to present with moderate PIP flexion contracture of LIF      Plan: Continue per plan of care.  Progress treatment as tolerated.       Precautions HTN; DM; Intellectual disability. DOI 10/19/23          POC expires Unit limit Auth  expiration date PT/OT + Visit Limit?   3/8/24 NA 3/8/24 BOMN                          Visit/Unit Tracking  AUTH Status:  Date 12/12 12/15 12/19 12/22 12/26 12/29  1/2  1/9  1/12  1/18  1/24     BOMN. Re due 24 Used 1 2 3 4 5  6 FOTO 7 RE  8  9  10  11       Remaining                                  Date 24   Visit   7 RE 8 9 10   Manuals           Edema massage  8' 5' 5' 5'   Jt mobs LIF MP, PIP 8'  7' 5' 5' 5'                         Ortho fit/train          Neuro Re-Ed                                                                                        Ther Ex          AAROM LIF 10' PROM 8' PROM 10' PROM 10' PROM 10' PROM   Blocking LIF         Digit abd/add         Active fisting x20 x20 x20 x30 x20    strength HG 55# x25 HG 25# x 30 HG 35# x 20 HG 55# x 20 HG 55# x20   Pinch strength R, G, B CP on horizontal bars  R and G CP on and off horizontal bars R and G CP on and off horizontal bars G and B CP on and off horizontal bars  G and CP on  bars   FB palm up, down R x20 ea     R x 10 ea R x 10 ea              Ther Activity          Rolling FB for digit ext x20   x20 x20 x20   Opposition Marbles  Foam cubes Foam cubes Foam cubes Foam cubes   Gross grasp  Macaroni transfer 1 set                  HEP                                Modalities           [facility] 5' 5' 5' 5' 5'

## 2024-01-26 ENCOUNTER — APPOINTMENT (OUTPATIENT)
Dept: OCCUPATIONAL THERAPY | Facility: CLINIC | Age: 59
End: 2024-01-26
Payer: MEDICARE

## 2024-01-26 ENCOUNTER — HOSPITAL ENCOUNTER (EMERGENCY)
Facility: HOSPITAL | Age: 59
Discharge: HOME | End: 2024-01-26
Attending: EMERGENCY MEDICINE
Payer: MEDICARE

## 2024-01-26 ENCOUNTER — APPOINTMENT (EMERGENCY)
Dept: CT IMAGING | Facility: HOSPITAL | Age: 59
End: 2024-01-26
Payer: MEDICARE

## 2024-01-26 VITALS
BODY MASS INDEX: 21.26 KG/M2 | SYSTOLIC BLOOD PRESSURE: 117 MMHG | RESPIRATION RATE: 16 BRPM | TEMPERATURE: 97.6 F | WEIGHT: 157 LBS | HEIGHT: 72 IN | HEART RATE: 85 BPM | OXYGEN SATURATION: 97 % | DIASTOLIC BLOOD PRESSURE: 60 MMHG
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
## 2024-01-26 DIAGNOSIS — S01.01XA LACERATION OF SCALP, INITIAL ENCOUNTER: ICD-10-CM

## 2024-01-26 PROCEDURE — 70450 CT HEAD/BRAIN W/O DYE: CPT

## 2024-01-26 PROCEDURE — 12001 RPR S/N/AX/GEN/TRNK 2.5CM/<: CPT

## 2024-01-26 PROCEDURE — G1004 CDSM NDSC: HCPCS

## 2024-01-26 PROCEDURE — 99284 EMERGENCY DEPT VISIT MOD MDM: CPT

## 2024-01-26 NOTE — DISCHARGE INSTRUCTIONS
Return to the emergency room immediately if there are any new or worsening symptoms or if the symptoms are lasting longer than expected.     Please have the staples removed in 7 days.

## 2024-01-26 NOTE — ED PROVIDER NOTES
History  Chief Complaint   Patient presents with    Fall     Fall with head strike, having headache and has small cut to right scalp. Not currently bleeding.     Patient is a 58-year-old male with relevant past medical history of ADRIANA, cerebral palsy, diabetes thyroid disease, gait disorder, hyperlipidemia, hypertension, osteoporosis, and seizures presenting via private vehicle from [facility] group home status post fall getting out of a chair.  Caregiver reports the patient was trying to get out of a chair in his bedroom and struck his head on a wardrobe dresser. Patient did not lose consciousness and remembers the entire incident. Caregiver reports patient falls frequently but has a small cut to his scalp and she wants it repaired. Bleeding is controlled. Patient is not on blood thinners. Patient is alert and oriented but unable to answer some questions due to mild intellectual disabilities. Patient knows where he is and who he is but does not know how old he is. This is baseline according to caregiver. Patient complains of a headache from the fall. Patient denies all other complaints including dizziness, changes in vision, and vomiting.          History provided by:  Patient and caregiver  Fall  Associated symptoms: headaches    Associated symptoms: no abdominal pain, no back pain, no chest pain, no seizures and no vomiting        Prior to Admission Medications   Prescriptions Last Dose Informant Patient Reported? Taking?   Blood Glucose Monitoring Suppl (ONETOUCH VERIO) w/Device KIT  Care Giver, Outside Facility (Specify) No No   Sig: by Does not apply route 3 (three) times a day TEST BLOOD SUGARS THREE TIMES   Patient not taking: Reported on 7/7/2023   Depakote 500 MG DR tablet   No No   Sig: Take 1 tab in the morning and 2 tabs at night. Brand necessary   Depakote  MG 24 hr tablet   No No   Sig: Take 1 tablet (250 mg total) by mouth daily Take in the morning with one 500 mg tablet (total morning dose of 750  mg). BRAND ONLY   Disposable Gloves (Safe-Sense Glove-Blk-Nitrl-L) MISC  Outside Facility (Specify), Care Giver No No   Sig: Use 1 each 3 (three) times a day as needed (care taker assisting with soiled briefs)   Patient not taking: Reported on 9/28/2023   Incontinence Supply Disposable (Briefs Overnight Medium) MISC  Outside Facility (Specify), Care Giver No No   Sig: Use in the morning   Patient not taking: Reported on 9/28/2023   Multiple Vitamin (Daily-Reina) TABS  Care Giver, Outside Facility (Specify) No No   Sig: Take 1 tablet by mouth daily Take at 8 AM   OneTouch Delica Lancets 33G MISC  Care Giver, Outside Facility (Specify) No No   Sig: by Does not apply route daily TEST BLOOD SUGARS THREE TIMES DAILY   Patient not taking: Reported on 1/24/2024   QUEtiapine (SEROquel XR) 200 mg 24 hr tablet  Outside Facility (Specify), Care Giver No No   Sig: TAKE 1 TABLET BY MOUTH TWICE A DAY (8AM,2PM)   QUEtiapine (SEROquel XR) 400 mg 24 hr tablet  Care Giver, Outside Facility (Specify) No No   Sig: TAKE 1 TABLET BY MOUTH AT BEDTIME (8PM) (DX: ANTIPSYCHOTIC)   benztropine (COGENTIN) 0.5 mg tablet  Care Giver No No   Sig: GIVE 1 TABLET BY MOUTH TWICE DAILY DX TREAT ADVERSE EFFECTS (DR RADU AVERY)   calcium carbonate (OYSTER SHELL,OSCAL) 500 mg  Care Giver, Outside Facility (Specify) No No   Sig: Take 1 tablet by mouth 3 (three) times a day   cholecalciferol (VITAMIN D3) 1,000 units tablet  Outside Facility (Specify), Care Giver No No   Sig: Take 1 tablet (1,000 Units total) by mouth daily   clonazePAM (KlonoPIN) 0.25 MG disintegrating tablet  Care Giver, Outside Facility (Specify) No No   Sig: Take 1 tablet (0.25 mg total) by mouth as needed for seizures (clusters of myoclonic jerking (more than 2 myoclonic jerks in a day)) for up to 1 dose   docusate sodium (COLACE) 100 mg capsule  Care Giver No No   Sig: GIVE 1 CAPSULE BY MOUTH TWICE DAILY FOR STOOL SOFTNER DR. RADU AVERY   gabapentin (NEURONTIN) 300 mg capsule   Care Giver, Outside Facility (Specify) Yes No   Sig: Take 300 mg by mouth 3 (three) times a day   hydrOXYzine pamoate (VISTARIL) 25 mg capsule  Outside Facility (Specify), Care Giver No No   Sig: Give 1 capsule PO in the AM and around 2PM for anxiety   ibuprofen (MOTRIN) 600 mg tablet  Care Giver, Outside Facility (Specify) Yes No   Sig: Take 600 mg by mouth every 6 (six) hours as needed for mild pain   lacosamide (VIMPAT) 150 mg tablet   No No   Sig: Take 1 tab (150 mg) in the morning.   lacosamide (VIMPAT) 200 mg tablet   No No   Sig: Take 1 tab (200 mg) at night.   levETIRAcetam (KEPPRA) 750 mg tablet   No No   Sig: GIVE 2 TABLETS BY MOUTH TWICE DAILY (=1500MG) FOR SEIZURE DISORDER DR. LIVE JONES   levothyroxine 150 mcg tablet  Care Giver No No   Sig: GIVE 1 TABLET BY MOUTH IN THE MORNING DX HYPOTHYROIDISM   lisinopril (ZESTRIL) 10 mg tablet  Outside Facility (Specify), Care Giver No No   Sig: Take 1 tablet (10 mg total) by mouth daily   loratadine (CLARITIN) 10 mg tablet  Care Giver, Outside Facility (Specify) Yes No   Sig: Take 10 mg by mouth daily   metFORMIN (GLUCOPHAGE) 1000 MG tablet  Outside Facility (Specify), Care Giver No No   Sig: Take 1 tablet (1,000 mg total) by mouth 2 (two) times a day with meals   simvastatin (ZOCOR) 40 mg tablet  Outside Facility (Specify), Care Giver No No   Sig: Take 1 tablet (40 mg total) by mouth daily at bedtime   temazepam (RESTORIL) 15 mg capsule  Care Giver, Outside Facility (Specify) No No   Sig: Take 1 capsule (15 mg total) by mouth daily at bedtime as needed for sleep   vitamin B-12 (VITAMIN B-12) 1,000 mcg tablet  Outside Facility (Specify), Care Giver No No   Sig: Take 1 tablet (1,000 mcg total) by mouth daily   Patient not taking: Reported on 1/5/2024   zonisamide (ZONEGRAN) 100 mg capsule  Outside Facility (Specify), Care Giver No No   Sig: Take 4 capsules (400 mg) nightly.   Patient taking differently: No sig reported      Facility-Administered Medications: None        Past Medical History:   Diagnosis Date    ADRIANA (acute kidney injury) ([facility]) 9/24/2019    Bacteremia due to Gram-positive bacteria 9/7/2021    Buttock wound, left, subsequent encounter 11/5/2021    COVID-19     CP (cerebral palsy) ([facility])     Depression     Diabetes ([facility])     Disease of thyroid gland     Gait disorder     Gluteal abscess 9/6/2021    Hyperlipidemia     Hypertension     Kidney failure     Kidney stones     Moderate protein-calorie malnutrition ([facility]) 12/22/2021    Osteoporosis     Pressure injury of left buttock, stage 4 ([facility]) 3/9/2022    Psychiatric disorder     Scoliosis     Seizures ([facility])     Sepsis ([facility]) 9/25/2019    Thyroid disease     UTI (urinary tract infection)        Past Surgical History:   Procedure Laterality Date    APPENDECTOMY      IR PICC PLACEMENT SINGLE LUMEN  9/13/2021    IR PICC PLACEMENT SINGLE LUMEN  9/16/2021       History reviewed. No pertinent family history.  I have reviewed and agree with the history as documented.    E-Cigarette/Vaping    E-Cigarette Use Never User      E-Cigarette/Vaping Substances    Nicotine No     THC No     CBD No     Flavoring No     Other No     Unknown No      Social History     Tobacco Use    Smoking status: Never    Smokeless tobacco: Never   Vaping Use    Vaping status: Never Used   Substance Use Topics    Alcohol use: Never    Drug use: No       Review of Systems   Constitutional:  Negative for chills and fever.   HENT:  Negative for ear pain and sore throat.         Small laceration to scalp   Eyes:  Negative for pain and visual disturbance.   Respiratory:  Negative for cough and shortness of breath.    Cardiovascular:  Negative for chest pain and palpitations.   Gastrointestinal:  Negative for abdominal pain and vomiting.   Genitourinary:  Negative for dysuria and hematuria.   Musculoskeletal:  Negative for arthralgias and back pain.   Skin:  Negative for color change and rash.   Neurological:  Positive for headaches. Negative for dizziness,  seizures, syncope and light-headedness.   All other systems reviewed and are negative.      Physical Exam  Physical Exam  Vitals and nursing note reviewed.   Constitutional:       General: He is not in acute distress.     Appearance: Normal appearance. He is well-developed and normal weight. He is not ill-appearing.   HENT:      Head: Normocephalic and atraumatic.     Eyes:      Conjunctiva/sclera: Conjunctivae normal.   Cardiovascular:      Rate and Rhythm: Normal rate and regular rhythm.      Heart sounds: No murmur heard.  Pulmonary:      Effort: Pulmonary effort is normal. No respiratory distress.      Breath sounds: Normal breath sounds.   Abdominal:      Palpations: Abdomen is soft.      Tenderness: There is no abdominal tenderness.   Musculoskeletal:         General: No swelling.      Cervical back: Neck supple.   Skin:     General: Skin is warm and dry.      Capillary Refill: Capillary refill takes less than 2 seconds.   Neurological:      Mental Status: He is alert.   Psychiatric:         Mood and Affect: Mood normal.         Vital Signs  ED Triage Vitals [01/26/24 1007]   Temperature Pulse Respirations Blood Pressure SpO2   97.6 °F (36.4 °C) 87 16 121/58 97 %      Temp Source Heart Rate Source Patient Position - Orthostatic VS BP Location FiO2 (%)   Tympanic Monitor Sitting Left arm --      Pain Score       --           Vitals:    01/26/24 1007 01/26/24 1015 01/26/24 1030 01/26/24 1115   BP: 121/58 121/58 117/58 133/62   Pulse: 87 86 82 87   Patient Position - Orthostatic VS: Sitting            Visual Acuity      ED Medications  Medications - No data to display    Diagnostic Studies  Results Reviewed       None                   CT head without contrast   Final Result by Virgilio Manuel MD (01/26 1121)      No acute intracranial abnormality.                  Workstation performed: [id number]                    Procedures  Universal Protocol:  Consent: Verbal consent obtained.  Risks and benefits:  risks, benefits and alternatives were discussed  Consent given by: patient  Patient understanding: patient states understanding of the procedure being performed  Patient consent: the patient's understanding of the procedure matches consent given  Procedure consent: procedure consent matches procedure scheduled  Patient identity confirmed: verbally with patient and arm band  Laceration repair    Date/Time: 1/26/2024 11:05 AM    Performed by: Mehrdad Arzola PA-C  Authorized by: Mehrdad Arzola PA-C  Body area: head/neck  Location details: scalp  Laceration length: 1.5 cm  Foreign bodies: no foreign bodies  Tendon involvement: none  Nerve involvement: none  Vascular damage: no    Sedation:  Patient sedated: no      Wound Dehiscence:  Superficial Wound Dehiscence: simple closure      Procedure Details:  Irrigation solution: saline  Debridement: none  Degree of undermining: none  Skin closure: staples  Approximation: close  Approximation difficulty: simple  Dressing: 4x4 sterile gauze  Patient tolerance: patient tolerated the procedure well with no immediate complications  Comments: 1 staple was applied. Skin approximated nicely.               ED Course  ED Course as of 01/26/24 1154   Fri Jan 26, 2024   1043 Vital signs reviewed and within normal limits.   1117 CT head without contrast  No acute findings on CT head without contrast upon my interpretation. Awaiting formal read.   1129 CT head without contrast  IMPRESSION:  No acute intracranial abnormality.     1141 Patient no longer complaining of a headache. Will discharge home.                               SBIRT 22yo+      Flowsheet Row Most Recent Value   Initial Alcohol Screen: US AUDIT-C     1. How often do you have a drink containing alcohol? 0 Filed at: 01/26/2024 1009   2. How many drinks containing alcohol do you have on a typical day you are drinking?  0 Filed at: 01/26/2024 1009   3a. Male UNDER 65: How often do you have five or more drinks on one  occasion? 0 Filed at: 01/26/2024 1009   Audit-C Score 0 Filed at: 01/26/2024 1009   ADRIAN: How many times in the past year have you...    Used an illegal drug or used a prescription medication for non-medical reasons? Never Filed at: 01/26/2024 1009                      Medical Decision Making  Patient is a 58-year-old male with relevant past medical history of ADRIANA, cerebral palsy, diabetes thyroid disease, gait disorder, hyperlipidemia, hypertension, osteoporosis, and seizures presenting via private vehicle from [facility] group home status post fall getting out of a chair. Vital signs are within normal limits and patient is neurologically intact. Patient has no focal neurodeficits, no signs of a basilar skull fracture, and did not lose consciousness. Patient is not on anticoagulants or antiplatelets.  Patient has a 1.5 cm laceration to his right parietal scalp region. Bleeding was controlled. See media tab for clinical image prior to staples. This was repaired with one staple and gauze with tape was applied.  CT head without contrast performed due to patient being unable to answer all questions due to intellectual disabilities.  Lab work not necessary.  Caregiver reports patient's tetanus is up-to-date.  Dispo: Patient discharged back to group home with caregiver. Advised to follow-up with his family doctor or return to the emergency room in 7 days to have the staples removed. Patient and caregiver are satisfied with care and agree with management and plan.      Amount and/or Complexity of Data Reviewed  External Data Reviewed: notes.  Radiology: ordered. Decision-making details documented in ED Course.             Disposition  Final diagnoses:   Fall, initial encounter   Laceration of scalp, initial encounter     Time reflects when diagnosis was documented in both MDM as applicable and the Disposition within this note       Time User Action Codes Description Comment    1/26/2024 11:19 AM Mehrdad Arzola Add  [W19.XXXA] Fall, initial encounter     1/26/2024 11:19 AM Mehrdad Arzola Add [S01.01XA] Laceration of scalp, initial encounter           ED Disposition       ED Disposition   Discharge    Condition   Stable    Date/Time   Fri Jan 26, 2024 1129    Comment   Jairon Oconnell discharge to home/self care.                   Follow-up Information       Follow up With Specialties Details Why Contact Info    [facility], Nurse Practitioner Schedule an appointment as soon as possible for a visit  Schedule an appointment with Dr. Jenkins to have your staples removed in seven days. [address]  [facility] [address]  [phone]              Patient's Medications   Discharge Prescriptions    No medications on file       No discharge procedures on file.    PDMP Review         Value Time User    PDMP Reviewed  Yes 9/1/2023  2:41 PM HUNTER Hopson            ED Provider  Electronically Signed by             Mehrdad Arzola PA-C  01/26/24 1150

## 2024-01-29 DIAGNOSIS — E03.8 HYPOTHYROIDISM DUE TO HASHIMOTO'S THYROIDITIS: ICD-10-CM

## 2024-01-29 DIAGNOSIS — K59.00 CONSTIPATION, UNSPECIFIED CONSTIPATION TYPE: ICD-10-CM

## 2024-01-29 DIAGNOSIS — I10 HYPERTENSION, UNSPECIFIED TYPE: ICD-10-CM

## 2024-01-29 DIAGNOSIS — E55.9 VITAMIN D DEFICIENCY: ICD-10-CM

## 2024-01-29 DIAGNOSIS — E06.3 HYPOTHYROIDISM DUE TO HASHIMOTO'S THYROIDITIS: ICD-10-CM

## 2024-01-29 DIAGNOSIS — E08.00 DIABETES MELLITUS DUE TO UNDERLYING CONDITION WITH HYPEROSMOLARITY WITHOUT COMA, WITHOUT LONG-TERM CURRENT USE OF INSULIN (HCC): ICD-10-CM

## 2024-01-29 DIAGNOSIS — E78.5 HYPERLIPIDEMIA, UNSPECIFIED HYPERLIPIDEMIA TYPE: ICD-10-CM

## 2024-01-29 DIAGNOSIS — G20.C PARKINSON'S PLUS SYNDROME: ICD-10-CM

## 2024-01-29 NOTE — ASSESSMENT & PLAN NOTE
He has not had any additional seizures since his last appointment.  His caregiver denies any clear myoclonus at this point.  He appears to be doing well since getting back on higher doses of zonisamide with his Depakote.  Although he still is taking levetiracetam and lacosamide and may not need to be on 4 medications, because he is doing well, his caregiver prefer to continue his medicines unchanged.  In the future we could consider decreasing his dose of levetiracetam.    --He will continue his medications unchanged.  If he would have additional events concerning for seizures or return of his myoclonus, his dose of zonisamide could be increased.    --Down the road, we could consider trying to simplify his regimen by decreasing levetiracetam, then possibly also lacosamide

## 2024-01-29 NOTE — ASSESSMENT & PLAN NOTE
He continues to have difficulty with behavioral problems, and does follow with psychiatry for ongoing management.  He is quite impulsive, and sometimes does things for attention.  He has not had any clear falls and overall his caregiver feels that he has been doing better.  He continues to have significant difficulty with gait imbalance, but this has not worsened over time

## 2024-01-30 ENCOUNTER — OFFICE VISIT (OUTPATIENT)
Dept: OCCUPATIONAL THERAPY | Facility: CLINIC | Age: 59
End: 2024-01-30
Payer: MEDICARE

## 2024-01-30 DIAGNOSIS — E78.5 HYPERLIPIDEMIA, UNSPECIFIED HYPERLIPIDEMIA TYPE: ICD-10-CM

## 2024-01-30 DIAGNOSIS — S62.641D CLOSED NONDISPLACED FRACTURE OF PROXIMAL PHALANX OF LEFT INDEX FINGER WITH ROUTINE HEALING, SUBSEQUENT ENCOUNTER: Primary | ICD-10-CM

## 2024-01-30 DIAGNOSIS — E03.8 HYPOTHYROIDISM DUE TO HASHIMOTO'S THYROIDITIS: ICD-10-CM

## 2024-01-30 DIAGNOSIS — E08.00 DIABETES MELLITUS DUE TO UNDERLYING CONDITION WITH HYPEROSMOLARITY WITHOUT COMA, WITHOUT LONG-TERM CURRENT USE OF INSULIN (HCC): ICD-10-CM

## 2024-01-30 DIAGNOSIS — K59.00 CONSTIPATION, UNSPECIFIED CONSTIPATION TYPE: ICD-10-CM

## 2024-01-30 DIAGNOSIS — E06.3 HYPOTHYROIDISM DUE TO HASHIMOTO'S THYROIDITIS: ICD-10-CM

## 2024-01-30 DIAGNOSIS — I10 HYPERTENSION, UNSPECIFIED TYPE: ICD-10-CM

## 2024-01-30 DIAGNOSIS — E55.9 VITAMIN D DEFICIENCY: ICD-10-CM

## 2024-01-30 DIAGNOSIS — G20.C PARKINSON'S PLUS SYNDROME: ICD-10-CM

## 2024-01-30 PROCEDURE — 97140 MANUAL THERAPY 1/> REGIONS: CPT

## 2024-01-30 PROCEDURE — 97110 THERAPEUTIC EXERCISES: CPT

## 2024-01-30 RX ORDER — SIMVASTATIN 40 MG
TABLET ORAL
Qty: 28 TABLET | OUTPATIENT
Start: 2024-01-30

## 2024-01-30 RX ORDER — BENZTROPINE MESYLATE 0.5 MG/1
TABLET ORAL
Qty: 56 TABLET | Refills: 0 | OUTPATIENT
Start: 2024-01-30

## 2024-01-30 RX ORDER — MELATONIN
Qty: 28 TABLET | OUTPATIENT
Start: 2024-01-30

## 2024-01-30 RX ORDER — DOCUSATE SODIUM 100 MG/1
100 CAPSULE, LIQUID FILLED ORAL 2 TIMES DAILY PRN
Qty: 180 CAPSULE | Refills: 1 | Status: ON HOLD | OUTPATIENT
Start: 2024-01-30

## 2024-01-30 RX ORDER — LISINOPRIL 10 MG/1
TABLET ORAL
Qty: 28 TABLET | OUTPATIENT
Start: 2024-01-30

## 2024-01-30 RX ORDER — CALCIUM CARBONATE 500(1250)
TABLET ORAL
Qty: 84 TABLET | OUTPATIENT
Start: 2024-01-30

## 2024-01-30 RX ORDER — LEVOTHYROXINE SODIUM 0.15 MG/1
150 TABLET ORAL DAILY
Qty: 90 TABLET | Refills: 3 | Status: ON HOLD | OUTPATIENT
Start: 2024-01-30

## 2024-01-30 RX ORDER — DOCUSATE SODIUM 100 MG/1
CAPSULE, LIQUID FILLED ORAL
Qty: 56 CAPSULE | Refills: 0 | OUTPATIENT
Start: 2024-01-30

## 2024-01-30 RX ORDER — CALCIUM CARBONATE 500(1250)
1 TABLET ORAL 3 TIMES DAILY
Qty: 240 TABLET | Refills: 3 | Status: ON HOLD | OUTPATIENT
Start: 2024-01-30

## 2024-01-30 RX ORDER — MELATONIN
1000 DAILY
Qty: 90 TABLET | Refills: 3 | Status: ON HOLD | OUTPATIENT
Start: 2024-01-30

## 2024-01-30 RX ORDER — LEVOTHYROXINE SODIUM 0.15 MG/1
TABLET ORAL
Qty: 28 TABLET | Refills: 1 | OUTPATIENT
Start: 2024-01-30

## 2024-01-30 RX ORDER — SIMVASTATIN 40 MG
40 TABLET ORAL
Qty: 90 TABLET | Refills: 1 | Status: ON HOLD | OUTPATIENT
Start: 2024-01-30

## 2024-01-30 RX ORDER — LISINOPRIL 10 MG/1
10 TABLET ORAL DAILY
Qty: 90 TABLET | Refills: 1 | Status: ON HOLD | OUTPATIENT
Start: 2024-01-30

## 2024-01-30 RX ORDER — BENZTROPINE MESYLATE 0.5 MG/1
0.5 TABLET ORAL 2 TIMES DAILY
Qty: 180 TABLET | Refills: 1 | Status: ON HOLD | OUTPATIENT
Start: 2024-01-30

## 2024-01-30 NOTE — PROGRESS NOTES
Daily Note     Today's date: 2024  Patient name: Jairon Oconnell  : 1965  MRN: [id number]  Referring provider: Gilles Minor MD  Dx:   Encounter Diagnosis     ICD-10-CM    1. Closed nondisplaced fracture of proximal phalanx of left index finger with routine healing, subsequent encounter  S62.641D           Start Time: 1445  Stop Time: 1525  Total time in clinic (min): 40 minutes    Subjective: Patient states that his finger feels good. He has pain at P1 of LIF      Objective: See treatment diary below      Assessment: Tolerated treatment well. Patient would benefit from continued OT. Patient has improved coordination and pinch strength while completing pinching and fine motor activities. He continues to be limited in LIF flexion but was able to achieve greater motion after passive stretching and heat.      Plan: Continue per plan of care.      Precautions HTN; DM; Intellectual disability. DOI 10/19/23          POC expires Unit limit Auth  expiration date PT/OT + Visit Limit?   3/8/24 NA 3/8/24 BOMN                          Visit/Unit Tracking  AUTH Status:  Date 12/12 12/15 12/19 12/22 12/26 12/29  1/2  1/9  1/12  1/18  1/24  1/30   BOMN. Re due 24 Used 1 2 3 4 5  6 FOTO 7 RE  8  9  10  11  12     Remaining                                  Date 24   Visit  11 12 8 9 10   Manuals          Edema massage  5' 5' 5' 5'   Jt mobs LIF MP, PIP 8' 5' 5' 5' 5'                       Ortho fit/train         Neuro Re-Ed                                                                                Ther Ex         AAROM LIF 10' PROM 15' PROM 10' PROM 10' PROM 10' PROM   Blocking LIF        Digit abd/add        Active fisting x20 x20 x20 x30 x20    strength HG 55# x25 HG 55# x25 HG 35# x 20 HG 55# x 20 HG 55# x20   Pinch strength R, G, B CP on horizontal bars G/B/B on bars R and G CP on and off horizontal bars G and B CP on and off horizontal bars  G and CP on bars   FB  palm up, down R x20 ea    R x 10 ea R x 10 ea             Ther Activity         Rolling FB for digit ext x20  x20 x20 x20   Opposition Marbles  Marbles Foam cubes Foam cubes Foam cubes   Gross grasp                   HEP                             Modalities           MHP 5' 5' 5' 5' 5'

## 2024-01-31 ENCOUNTER — OFFICE VISIT (OUTPATIENT)
Dept: AUDIOLOGY | Age: 59
End: 2024-01-31
Payer: MEDICARE

## 2024-01-31 DIAGNOSIS — H90.3 SENSORY HEARING LOSS, BILATERAL: Primary | ICD-10-CM

## 2024-01-31 PROCEDURE — 92556 SPEECH AUDIOMETRY COMPLETE: CPT

## 2024-01-31 PROCEDURE — 92567 TYMPANOMETRY: CPT

## 2024-01-31 PROCEDURE — 92552 PURE TONE AUDIOMETRY AIR: CPT

## 2024-01-31 NOTE — PROGRESS NOTES
Hearing Evaluation    Name:  Jairon Oconnell  :  1965  Age:  58 y.o.  MRN:  [id number]  Date of Evaluation: 24     HISTORY:    Jairon Oconnell was accompanied to today's appointment by caregiver. No issues regarding hearing was reported.    EVALUATION:    Otoscopy  Right: non-occluding cerumen  Left: non-occluding cerumen    Tympanometry  Right: Type A - normal middle ear pressure and compliance  Left: Type A - normal middle ear pressure and compliance    Speech Audiometry:  Sound Reception Threshold (SRT) was obtained via spondee words.     Audiometry:   Conventional audiometry was attempted. Mild hearing loss was revealed with poor reliability. VRA and CPA were attempted, but patient was continuously raising his hand.    *see attached audiogram    RECOMMENDATIONS:  Annual hearing eval and Return to [facility]. for F/U    PATIENT EDUCATION:   The results of today's results and recommendations were reviewed with caregiver and his hearing thresholds were explained at length.  Questions were addressed and the caregiver was encouraged to contact our department should concerns arise.      Noel Oates., [facility]  Clinical Audiologist  [facility] AUDIOLOGY  [address] [place] [address] RD  [place] [phone]

## 2024-02-01 ENCOUNTER — EVALUATION (OUTPATIENT)
Dept: OCCUPATIONAL THERAPY | Facility: CLINIC | Age: 59
End: 2024-02-01
Payer: MEDICARE

## 2024-02-01 DIAGNOSIS — S62.641D CLOSED NONDISPLACED FRACTURE OF PROXIMAL PHALANX OF LEFT INDEX FINGER WITH ROUTINE HEALING, SUBSEQUENT ENCOUNTER: Primary | ICD-10-CM

## 2024-02-01 PROCEDURE — 97110 THERAPEUTIC EXERCISES: CPT

## 2024-02-01 PROCEDURE — 97140 MANUAL THERAPY 1/> REGIONS: CPT

## 2024-02-01 NOTE — PROGRESS NOTES
OT Re-Evaluation    Today's date: 2024  Patient name: Jairon Oconnell  : 1965  MRN: [id number]  Referring provider: Gilles Minor MD  Dx:   Encounter Diagnosis     ICD-10-CM    1. Closed nondisplaced fracture of proximal phalanx of left index finger with routine healing, subsequent encounter  S62.641D               Start Time: 1215  Stop Time: 1300  Total time in clinic (min): 45 minutes    Assessment  Assessment details: 23: Jairon Oconnell is a 58 y.o., Right HD male referred to hand therapy for a LIF proximal phalanx fracture.  Onset of injury 10/19/23 due to fall.  Patient presents 23 with impaired ROM, strength of the LIF and hand.  Deficits also noted in pain, edema and functional use of the left UE. Patient has intellectual disability and lives in a group home. Patient is a good candidate for OT services to abolish pain and edema and restore ROM, strength, coordination for a return to independence in daily tasks.    24: Patient continues to present with limitations in ROM of LIF but has made improvements in AROM of L wrist and is now WFL. Caregiver reports that patient removes splint at night. Discussed options for using softer and more comfortable material. Caregiver reports that he will likely remove it either way. Edema in LIF may be limiting his AROM of LIF. Will continue to focus on PIP/MP joint mobilization, edema massage, and AROM to maximize ROM of LIF.    24: Patient seen 13 times in OT and has made improvement in edema around LIF and total motion of LIF passively. He continues to be limited due to the LIF PIP flexion contracture. He has not been wearing splint because he removes it whenever it is on, as per caregiver. He overall has improved use of LIF during pinching and coordination activities with decreased occurrences of dropping objects or applying too little or too much force. Upon caregiver report, he has used his left hand for more activities such as bowling  "and playing with popsicle sticks. Will continue OT for 2-3 more weeks to encourage functional use of L hand and to improve pinch, , and overall LUE strength.    Impairments: abnormal coordination, abnormal or restricted ROM, activity intolerance, impaired physical strength, lacks appropriate home exercise program and pain with function  Other impairment: Intellectual disability    Symptom irritability: moderateUnderstanding of Dx/Px/POC: excellent   Prognosis: good    Goals  STGs (4 weeks)  Patient will be independent in HEP of ROM, edema management MET  Patient will report \"low\" pain level MET  Patient will demonstrate 20 degree improvement in KING of the LIF NOT MET  Patient will demonstrate active wrist extension/flexion to WFL MET  LTGs (12 weeks)  Patient will demonstrate independence in a HEP to maintain ROM, strength, and function at discharge NOT MET  Patient will report resolution of pain to be independent in daily tasks NOT MET  Patient will demonstrate KING of the LIF to 210 degrees to be independent in self care tasks NOT MET  Patient will demonstrate AROM of the wrist and forearm WFL to be independent in self care tasks MET  Patient will demonstrate 5/5 muscle strength in the wrist and forearm to be MI for meal prep MET  Patient will demonstrate left  strength within 30% of the right hand to be MI for IADL tasks IN PROGRESS  Patient will demonstrate resolution of edema IN PROGRESS      Plan  Plan details: 12/12/23: Begin OT 2x/wk x 12 weeks  1/2/24: Continue OT 2x/week 4-6 weeks  2/1/24: Continue OT 2x/week 2-3 weeks  Patient would benefit from: skilled occupational therapy and custom splinting  Planned modality interventions: ultrasound, thermotherapy: hydrocollator packs and cryotherapy  Planned therapy interventions: activity modification, compression, fine motor coordination training, graded activity, graded exercise, home exercise program, therapeutic exercise, therapeutic activities, " "stretching, strengthening, patient education, orthotic fitting/training, neuromuscular re-education, manual therapy, IASTM, joint mobilization, kinesiology taping, massage and muscle pump exercises  Frequency: 2x week  Duration in weeks: 12  Plan of Care beginning date: 12/12/2023  Plan of Care expiration date: 3/8/2024  Treatment plan discussed with: patient        Subjective Evaluation    History of Present Illness  Date of onset: 10/19/2023  Mechanism of injury: 12/12/23: This is a 58-year-old male presenting who reports he fell down the driveway going to the car and landed on his L hand  Seen in ER 10/20/23.  XR done and patient put in a splint and referred to orthopedics. Patient had a closed reduction. Splint has been discharged and patient now presents for OT evaluation and treatment of left hand pain and stiffness    1/2/24: Patient continues to report pain on volar surface of P1 of LIF. As per caregiver report, patient doffs PIP extension splint during nighttime and has not been wearing it as much.    2/1/24: Patient continues to report pain at P1 of LIF. Caregiver reports he has not been wearing his splint at night. She reports that he has been using his hand for leisure activities such as bowling and playing with popsicle sticks.    Patient Goals  Patient goals for therapy: decreased edema, decreased pain, increased motion, increased strength and independence with ADLs/IADLs    Pain  Location: Patient unable to rate pain level due to intelectual impairment, but reports \" alot\" of pain in LIF on voar surface    Social Support  Lives with: Lives in group home.      Diagnostic Tests  X-ray: abnormal (healing fracture)  Treatments  Previous treatment: immobilization  Current treatment: occupational therapy      Objective     Observations     Left Wrist/Hand   Positive for deformity and edema.     Additional Observation Details  12/12/23: LIF PIP flexion contracture  1/2/24: LIF PIP flexion " contracture  2/1/24: Continues to present with LIF PIP flexion contracture    Tenderness     Additional Tenderness Details  12/12/23: Tender with palpation of PIP jt LIF    Neurological Testing     Sensation     Wrist/Hand   Left   Intact: light touch    Active Range of Motion     Left Wrist   Wrist flexion: 67 (37 degree improvement) degrees   Wrist extension: 64 (4 dgeree improvement) degrees     Left Thumb     Opposition: 12/12/23: Grossly intact  1/2/24: Grossly WNL  2/1/24: Grossly WNL    Left Digits    Flexion   Index     MCP: 55    PIP: 80    DIP: 35  Extension   Index     PIP: -50    DIP: -20  Index: 100 degrees    Additional Active Range of Motion Details  2/1/24: Wrist AROM WFLs  12/12/23: digits 3-5 WNL.  1/2/24: digits 3-5 WNL    Passive Range of Motion     Left Digits   Flexion   Index     MCP: 65    PIP: 90    DIP: 65  Extension   Index     PIP: -25    DIP: -10  Index: 185 degrees    Additional Passive Range of Motion Details  1/2/24: Improvements in wrist flexion and LIF PIP flexion AROM  2/1/24: LIF KING improved 5 degrees    Swelling     Left Wrist/Hand   Index     Proximal: 7.5 cm    Middle: 5.8 cm    Distal: 4.7 cm    Additional Swelling Details  2/1/24:  LIF P1 decreased .5 cm, P2 decreased .2 cm    Right Wrist/Hand   Index     Proximal: 7 cm    Middle: 5.5 cm    Distal: 5 cm             Precautions HTN; DM; Intellectual disability. DOI 10/19/23          POC expires Unit limit Auth  expiration date PT/OT + Visit Limit?   3/8/24 NA 3/8/24 BOMN                          Visit/Unit Tracking  AUTH Status:  Date 12/12 12/15 12/19 12/22 12/26 12/29  1/2  1/9  1/12  1/18  1/24  1/30   BOMN. Re due 1/5/24 Used 1 2 3 4 5  6 FOTO 7 RE  8  9  10  11  12     Remaining                              2/1 RE                13                                    Date 1/24/24 1/30/24 2/1/24 RE 1/12/24 1/18/24   Visit  11 12 13 9 10   Manuals            Edema massage   5'  5' 5'   Jt mobs LIF MP, PIP 8' 5' 8' 5' 5'                              Ortho fit/train            Neuro Re-Ed                                                                                                        Ther Ex            AAROM LIF 10' PROM 15' PROM 10' PROM 10' PROM 10' PROM   Blocking LIF            Digit abd/add            Active fisting x20 x20 x20 x30 x20    strength HG 55# x25 HG 55# x25 HG 55# x30 HG 55# x 20 HG 55# x20   Pinch strength R, G, B CP on horizontal bars G/B/B on bars G/B/B on bars G and B CP on and off horizontal bars  G and CP on bars   FB palm up, down R x20 ea    R x 10 ea R x 10 ea                Ther Activity            Rolling FB for digit ext x20    x20 x20   Opposition Marbles  Marbles Marbles Foam cubes Foam cubes   Gross grasp                         HEP                                      Modalities            MHP 5' 5' 5' 5' 5'

## 2024-02-02 ENCOUNTER — OFFICE VISIT (OUTPATIENT)
Dept: OBGYN CLINIC | Facility: CLINIC | Age: 59
End: 2024-02-02
Payer: MEDICARE

## 2024-02-02 ENCOUNTER — TELEPHONE (OUTPATIENT)
Dept: OBGYN CLINIC | Facility: CLINIC | Age: 59
End: 2024-02-02

## 2024-02-02 ENCOUNTER — OFFICE VISIT (OUTPATIENT)
Dept: FAMILY MEDICINE CLINIC | Facility: CLINIC | Age: 59
End: 2024-02-02
Payer: MEDICARE

## 2024-02-02 VITALS
HEIGHT: 72 IN | TEMPERATURE: 96.9 F | BODY MASS INDEX: 20.32 KG/M2 | SYSTOLIC BLOOD PRESSURE: 162 MMHG | HEART RATE: 68 BPM | DIASTOLIC BLOOD PRESSURE: 90 MMHG | WEIGHT: 150 LBS

## 2024-02-02 VITALS
HEIGHT: 72 IN | SYSTOLIC BLOOD PRESSURE: 130 MMHG | HEART RATE: 87 BPM | BODY MASS INDEX: 20.32 KG/M2 | WEIGHT: 150 LBS | TEMPERATURE: 97.1 F | DIASTOLIC BLOOD PRESSURE: 69 MMHG

## 2024-02-02 DIAGNOSIS — E11.9 TYPE 2 DIABETES MELLITUS WITHOUT COMPLICATION, WITHOUT LONG-TERM CURRENT USE OF INSULIN (HCC): ICD-10-CM

## 2024-02-02 DIAGNOSIS — S01.01XS SCALP LACERATION, SEQUELA: Primary | ICD-10-CM

## 2024-02-02 DIAGNOSIS — S62.641D CLOSED NONDISPLACED FRACTURE OF PROXIMAL PHALANX OF LEFT INDEX FINGER WITH ROUTINE HEALING, SUBSEQUENT ENCOUNTER: Primary | ICD-10-CM

## 2024-02-02 PROCEDURE — 99213 OFFICE O/P EST LOW 20 MIN: CPT | Performed by: NURSE PRACTITIONER

## 2024-02-02 PROCEDURE — 99214 OFFICE O/P EST MOD 30 MIN: CPT | Performed by: FAMILY MEDICINE

## 2024-02-02 RX ORDER — IBUPROFEN 200 MG
TABLET ORAL 3 TIMES DAILY
Qty: 3.5 G | Refills: 0 | Status: ON HOLD | OUTPATIENT
Start: 2024-02-02

## 2024-02-02 NOTE — PROGRESS NOTES
[facility] [address] [address] [phone]      Assessment:  1. Closed nondisplaced fracture of proximal phalanx of left index finger with routine healing, subsequent encounter  -     Ambulatory Referral to Hand Surgery; Future    2. Type 2 diabetes mellitus without complication, without long-term current use of insulin ([facility])        Plan:  Patient Instructions   F/u here as needed  Referral to hand surgery  Continue wearing brace at night  Hold therapy for now.   Return for Ortho hand referral, Recheck.    Chief Complaint:  Chief Complaint   Patient presents with    Left Hand - Follow-up       Subjective:   HPI    Patient ID: Jairon Oconnell is a 58 y.o. male     Here with caregiver for f/u  L 2nd prox phalanx fx  No pain  No complaints  Going to OT  Given night brace from therapy but doesn't wear it  He was unable to keep cast on- kept wetting the cast and then brace was placed but he kept taking that off.  No pain meds    Review of Systems   Constitutional:  Negative for fatigue and fever.   Respiratory:  Negative for shortness of breath.    Cardiovascular:  Negative for chest pain.   Gastrointestinal:  Negative for abdominal pain and nausea.   Genitourinary:  Negative for dysuria.   Musculoskeletal:  Negative for arthralgias.   Skin:  Negative for rash and wound.   Neurological:  Negative for weakness and headaches.       Objective:  Vitals:  /69 (BP Location: Left arm, Patient Position: Sitting, Cuff Size: Standard)   Pulse 87   Temp (!) 97.1 °F (36.2 °C) (Tympanic)   Ht 6' (1.829 m)   Wt 68 kg (150 lb)   BMI 20.34 kg/m²     The following portions of the patient's history were reviewed and updated as appropriate: allergies, current medications, past family history, past medical history, past social history, past surgical history, and problem list.    Physical exam:  Physical Exam  Constitutional:       Appearance:  Normal appearance. He is normal weight.   Eyes:      Extraocular Movements: Extraocular movements intact.   Pulmonary:      Effort: Pulmonary effort is normal.   Musculoskeletal:         General: No tenderness.      Cervical back: Normal range of motion.      Comments: L 2nd phalanx no TTP-  PIP joint in flexion   Skin:     General: Skin is warm and dry.   Neurological:      General: No focal deficit present.      Mental Status: He is alert and oriented to person, place, and time. Mental status is at baseline.   Psychiatric:         Mood and Affect: Mood normal.         Behavior: Behavior normal.         Thought Content: Thought content normal.         Judgment: Judgment normal.       Ortho Exam          Gilles Minor MD

## 2024-02-02 NOTE — PATIENT INSTRUCTIONS
F/u here as needed  Referral to hand surgery  Continue wearing brace at night  Hold therapy for now.

## 2024-02-02 NOTE — PROGRESS NOTES
Assessment/Plan:    Problem List Items Addressed This Visit    None  Visit Diagnoses     Scalp laceration, sequela    -  Primary    Relevant Medications    neomycin-bacitracin-polymyxin (NEOSPORIN) 5-400-5,000 ointment           Diagnoses and all orders for this visit:    Scalp laceration, sequela  -     neomycin-bacitracin-polymyxin (NEOSPORIN) 5-400-5,000 ointment; Apply topically 3 (three) times a day        No problem-specific Assessment & Plan notes found for this encounter.        Subjective:      Patient ID: Jairon Oconnell is a 58 y.o. male.     Pt presents with caregiver for staple removal of the right frontal scalp area. Caregiver reports the patient was trying to get out of a chair in his bedroom and struck his head on a wardrobe dresser. Patient is alert and oriented but unable to answer some questions due to mild intellectual disabilities.     Suture / Staple Removal  The sutures were placed 7 to 10 days ago. The treatment provided significant relief. There has been no drainage from the wound. There is no redness present. There is no swelling present. There is no pain present.       The following portions of the patient's history were reviewed and updated as appropriate:   He has a past medical history of ADRIANA (acute kidney injury) ([facility]) (9/24/2019), Bacteremia due to Gram-positive bacteria (9/7/2021), Buttock wound, left, subsequent encounter (11/5/2021), COVID-19, CP (cerebral palsy) ([facility]), Depression, Diabetes ([facility]), Disease of thyroid gland, Gait disorder, Gluteal abscess (9/6/2021), Hyperlipidemia, Hypertension, Kidney failure, Kidney stones, Moderate protein-calorie malnutrition ([facility]) (12/22/2021), Osteoporosis, Pressure injury of left buttock, stage 4 ([facility]) (3/9/2022), Psychiatric disorder, Scoliosis, Seizures ([facility]), Sepsis ([facility]) (9/25/2019), Thyroid disease, and UTI (urinary tract infection).,  does not have any pertinent problems on file.,   has a past surgical history that includes  Appendectomy; IR PICC placement single lumen (9/13/2021); and IR PICC placement single lumen (9/16/2021).,  family history is not on file.,   reports that he has never smoked. He has never used smokeless tobacco. He reports that he does not drink alcohol and does not use drugs.,  is allergic to erythromycin and penicillins..  Current Outpatient Medications   Medication Sig Dispense Refill   • neomycin-bacitracin-polymyxin (NEOSPORIN) 5-400-5,000 ointment Apply topically 3 (three) times a day 3.5 g 0   • benztropine (COGENTIN) 0.5 mg tablet Take 1 tablet (0.5 mg total) by mouth 2 (two) times a day 180 tablet 1   • Blood Glucose Monitoring Suppl (ONETOUCH VERIO) w/Device KIT by Does not apply route 3 (three) times a day TEST BLOOD SUGARS THREE TIMES (Patient not taking: Reported on 7/7/2023) 1 kit 0   • calcium carbonate (OYSTER SHELL,OSCAL) 500 mg Take 1 tablet by mouth 3 (three) times a day 240 tablet 3   • cholecalciferol (VITAMIN D3) 1,000 units tablet Take 1 tablet (1,000 Units total) by mouth daily 90 tablet 3   • clonazePAM (KlonoPIN) 0.25 MG disintegrating tablet Take 1 tablet (0.25 mg total) by mouth as needed for seizures (clusters of myoclonic jerking (more than 2 myoclonic jerks in a day)) for up to 1 dose 10 tablet 2   • Depakote 500 MG DR tablet Take 1 tab in the morning and 2 tabs at night. Brand necessary 90 tablet 11   • Depakote  MG 24 hr tablet Take 1 tablet (250 mg total) by mouth daily Take in the morning with one 500 mg tablet (total morning dose of 750 mg). BRAND ONLY 90 tablet 1   • Disposable Gloves (Safe-Sense Glove-Blk-Nitrl-L) MISC Use 1 each 3 (three) times a day as needed (care taker assisting with soiled briefs) (Patient not taking: Reported on 9/28/2023) 100 each 1   • docusate sodium (COLACE) 100 mg capsule Take 1 capsule (100 mg total) by mouth 2 (two) times a day as needed for constipation 180 capsule 1   • gabapentin (NEURONTIN) 300 mg capsule Take 300 mg by mouth 3 (three)  times a day     • hydrOXYzine pamoate (VISTARIL) 25 mg capsule Give 1 capsule PO in the AM and around 2PM for anxiety 56 capsule 0   • ibuprofen (MOTRIN) 600 mg tablet Take 600 mg by mouth every 6 (six) hours as needed for mild pain     • Incontinence Supply Disposable (Briefs Overnight Medium) MISC Use in the morning (Patient not taking: Reported on 9/28/2023) 20 each 11   • lacosamide (VIMPAT) 150 mg tablet Take 1 tab (150 mg) in the morning. 30 tablet 5   • lacosamide (VIMPAT) 200 mg tablet Take 1 tab (200 mg) at night. 30 tablet 5   • levETIRAcetam (KEPPRA) 750 mg tablet GIVE 2 TABLETS BY MOUTH TWICE DAILY (=1500MG) FOR SEIZURE DISORDER DR. LIVE JONES 120 tablet 11   • levothyroxine 150 mcg tablet Take 1 tablet (150 mcg total) by mouth daily 90 tablet 3   • lisinopril (ZESTRIL) 10 mg tablet Take 1 tablet (10 mg total) by mouth daily 90 tablet 1   • loratadine (CLARITIN) 10 mg tablet Take 10 mg by mouth daily     • metFORMIN (GLUCOPHAGE) 1000 MG tablet Take 1 tablet (1,000 mg total) by mouth 2 (two) times a day with meals 180 tablet 0   • Multiple Vitamin (Daily-Reina) TABS Take 1 tablet by mouth daily Take at 8 AM 90 tablet 11   • OneTouch Delica Lancets 33G MISC by Does not apply route daily TEST BLOOD SUGARS THREE TIMES DAILY (Patient not taking: Reported on 1/24/2024) 100 each 2   • QUEtiapine (SEROquel XR) 200 mg 24 hr tablet TAKE 1 TABLET BY MOUTH TWICE A DAY (8AM,2PM) 180 tablet 1   • QUEtiapine (SEROquel XR) 400 mg 24 hr tablet TAKE 1 TABLET BY MOUTH AT BEDTIME (8PM) (DX: ANTIPSYCHOTIC) 90 tablet 1   • simvastatin (ZOCOR) 40 mg tablet Take 1 tablet (40 mg total) by mouth daily at bedtime 90 tablet 1   • temazepam (RESTORIL) 15 mg capsule Take 1 capsule (15 mg total) by mouth daily at bedtime as needed for sleep 30 capsule 1   • vitamin B-12 (VITAMIN B-12) 1,000 mcg tablet Take 1 tablet (1,000 mcg total) by mouth daily (Patient not taking: Reported on 1/5/2024) 30 tablet 5   • zonisamide (ZONEGRAN) 100 mg  "capsule Take 4 capsules (400 mg) nightly. (Patient taking differently: No sig reported) 120 capsule 11     No current facility-administered medications for this visit.            Review of Systems   Skin:  Positive for wound.   All other systems reviewed and are negative.        Objective:  Vitals:    02/02/24 1427   BP: 162/90   Pulse: 68   Temp: (!) 96.9 °F (36.1 °C)   TempSrc: Tympanic   Weight: 68 kg (150 lb)   Height: 6' (1.829 m)     Body mass index is 20.34 kg/m².     Physical Exam  Vitals and nursing note reviewed.   HENT:      Head: Normocephalic.     Cardiovascular:      Rate and Rhythm: Normal rate.   Pulmonary:      Effort: Pulmonary effort is normal.   Skin:     General: Skin is warm and dry.      Capillary Refill: Capillary refill takes less than 2 seconds.   Neurological:      General: No focal deficit present.      Mental Status: He is alert.             Portions of the record may have been created with voice recognition software. Occasional wrong word or \"sound a like\" substitutions may have occurred due to the inherent limitations of voice recognition software. Read the chart carefully and recognize, using context, where substitutions have occurred. Contact me with any questions.  "

## 2024-02-04 ENCOUNTER — HOSPITAL ENCOUNTER (INPATIENT)
Facility: HOSPITAL | Age: 59
LOS: 18 days | Discharge: NURSING, REHAB OR LONG-TERM CARE | DRG: 536 | End: 2024-02-22
Attending: FAMILY MEDICINE | Admitting: INTERNAL MEDICINE
Payer: MEDICARE

## 2024-02-04 ENCOUNTER — APPOINTMENT (EMERGENCY)
Dept: CT IMAGING | Facility: HOSPITAL | Age: 59
DRG: 536 | End: 2024-02-04
Payer: MEDICARE

## 2024-02-04 ENCOUNTER — APPOINTMENT (EMERGENCY)
Dept: RADIOLOGY | Facility: HOSPITAL | Age: 59
DRG: 536 | End: 2024-02-04
Payer: MEDICARE

## 2024-02-04 DIAGNOSIS — F69 BEHAVIOR CONCERN IN ADULT: ICD-10-CM

## 2024-02-04 DIAGNOSIS — M25.561 RIGHT KNEE PAIN: ICD-10-CM

## 2024-02-04 DIAGNOSIS — E11.40 TYPE 2 DIABETES MELLITUS WITH DIABETIC NEUROPATHY, WITHOUT LONG-TERM CURRENT USE OF INSULIN (HCC): ICD-10-CM

## 2024-02-04 DIAGNOSIS — R26.2 AMBULATORY DYSFUNCTION: ICD-10-CM

## 2024-02-04 DIAGNOSIS — G40.309 GENERALIZED CONVULSIVE EPILEPSY (HCC): Chronic | ICD-10-CM

## 2024-02-04 DIAGNOSIS — S72.114A NONDISPLACED FRACTURE OF GREATER TROCHANTER OF RIGHT FEMUR, INITIAL ENCOUNTER FOR CLOSED FRACTURE (HCC): ICD-10-CM

## 2024-02-04 DIAGNOSIS — S72.111A: Primary | ICD-10-CM

## 2024-02-04 DIAGNOSIS — G80.9 CEREBRAL PALSY, UNSPECIFIED TYPE (HCC): ICD-10-CM

## 2024-02-04 LAB
GLUCOSE SERPL-MCNC: 104 MG/DL (ref 65–140)
GLUCOSE SERPL-MCNC: 81 MG/DL (ref 65–140)
GLUCOSE SERPL-MCNC: 85 MG/DL (ref 65–140)

## 2024-02-04 PROCEDURE — 82948 REAGENT STRIP/BLOOD GLUCOSE: CPT

## 2024-02-04 PROCEDURE — 99223 1ST HOSP IP/OBS HIGH 75: CPT | Performed by: NURSE PRACTITIONER

## 2024-02-04 PROCEDURE — 73564 X-RAY EXAM KNEE 4 OR MORE: CPT

## 2024-02-04 PROCEDURE — 73700 CT LOWER EXTREMITY W/O DYE: CPT

## 2024-02-04 PROCEDURE — 73502 X-RAY EXAM HIP UNI 2-3 VIEWS: CPT

## 2024-02-04 PROCEDURE — 99284 EMERGENCY DEPT VISIT MOD MDM: CPT

## 2024-02-04 PROCEDURE — 99285 EMERGENCY DEPT VISIT HI MDM: CPT | Performed by: PHYSICIAN ASSISTANT

## 2024-02-04 RX ORDER — LEVETIRACETAM 500 MG/1
1500 TABLET ORAL EVERY 12 HOURS SCHEDULED
Status: DISCONTINUED | OUTPATIENT
Start: 2024-02-04 | End: 2024-02-22 | Stop reason: HOSPADM

## 2024-02-04 RX ORDER — LACOSAMIDE 50 MG/1
200 TABLET ORAL
Status: DISCONTINUED | OUTPATIENT
Start: 2024-02-04 | End: 2024-02-12

## 2024-02-04 RX ORDER — CALCIUM CARBONATE 500(1250)
1 TABLET ORAL 3 TIMES DAILY
Status: DISCONTINUED | OUTPATIENT
Start: 2024-02-04 | End: 2024-02-22 | Stop reason: HOSPADM

## 2024-02-04 RX ORDER — HYDROXYZINE 50 MG/1
25 TABLET, FILM COATED ORAL 2 TIMES DAILY
Status: DISCONTINUED | OUTPATIENT
Start: 2024-02-04 | End: 2024-02-16

## 2024-02-04 RX ORDER — PRAVASTATIN SODIUM 40 MG
80 TABLET ORAL
Status: DISCONTINUED | OUTPATIENT
Start: 2024-02-04 | End: 2024-02-22 | Stop reason: HOSPADM

## 2024-02-04 RX ORDER — GABAPENTIN 300 MG/1
300 CAPSULE ORAL 3 TIMES DAILY
Status: DISCONTINUED | OUTPATIENT
Start: 2024-02-04 | End: 2024-02-16

## 2024-02-04 RX ORDER — LACOSAMIDE 150 MG/1
150 TABLET ORAL DAILY
Status: DISCONTINUED | OUTPATIENT
Start: 2024-02-05 | End: 2024-02-22 | Stop reason: HOSPADM

## 2024-02-04 RX ORDER — ZONISAMIDE 100 MG/1
300 CAPSULE ORAL
Status: DISCONTINUED | OUTPATIENT
Start: 2024-02-04 | End: 2024-02-22 | Stop reason: HOSPADM

## 2024-02-04 RX ORDER — INSULIN LISPRO 100 [IU]/ML
1-5 INJECTION, SOLUTION INTRAVENOUS; SUBCUTANEOUS
Status: DISCONTINUED | OUTPATIENT
Start: 2024-02-04 | End: 2024-02-22 | Stop reason: HOSPADM

## 2024-02-04 RX ORDER — TEMAZEPAM 7.5 MG/1
15 CAPSULE ORAL
Status: DISCONTINUED | OUTPATIENT
Start: 2024-02-04 | End: 2024-02-22 | Stop reason: HOSPADM

## 2024-02-04 RX ORDER — LEVOTHYROXINE SODIUM 0.07 MG/1
150 TABLET ORAL
Status: DISCONTINUED | OUTPATIENT
Start: 2024-02-05 | End: 2024-02-22 | Stop reason: HOSPADM

## 2024-02-04 RX ORDER — LISINOPRIL 10 MG/1
10 TABLET ORAL DAILY
Status: DISCONTINUED | OUTPATIENT
Start: 2024-02-05 | End: 2024-02-22 | Stop reason: HOSPADM

## 2024-02-04 RX ORDER — DIVALPROEX SODIUM 500 MG/1
1000 TABLET, DELAYED RELEASE ORAL
Status: DISCONTINUED | OUTPATIENT
Start: 2024-02-04 | End: 2024-02-22 | Stop reason: HOSPADM

## 2024-02-04 RX ORDER — HEPARIN SODIUM 5000 [USP'U]/ML
5000 INJECTION, SOLUTION INTRAVENOUS; SUBCUTANEOUS EVERY 8 HOURS SCHEDULED
Status: DISCONTINUED | OUTPATIENT
Start: 2024-02-04 | End: 2024-02-22 | Stop reason: HOSPADM

## 2024-02-04 RX ORDER — DOCUSATE SODIUM 100 MG/1
100 CAPSULE, LIQUID FILLED ORAL 2 TIMES DAILY
Status: DISCONTINUED | OUTPATIENT
Start: 2024-02-04 | End: 2024-02-22 | Stop reason: HOSPADM

## 2024-02-04 RX ORDER — BENZTROPINE MESYLATE 0.5 MG/1
0.5 TABLET ORAL 2 TIMES DAILY
Status: DISCONTINUED | OUTPATIENT
Start: 2024-02-04 | End: 2024-02-16

## 2024-02-04 RX ORDER — ACETAMINOPHEN 325 MG/1
650 TABLET ORAL EVERY 6 HOURS PRN
Status: DISCONTINUED | OUTPATIENT
Start: 2024-02-04 | End: 2024-02-22 | Stop reason: HOSPADM

## 2024-02-04 RX ORDER — QUETIAPINE 400 MG/1
400 TABLET, FILM COATED, EXTENDED RELEASE ORAL
Status: DISCONTINUED | OUTPATIENT
Start: 2024-02-04 | End: 2024-02-22 | Stop reason: HOSPADM

## 2024-02-04 RX ORDER — MELATONIN
1000 DAILY
Status: DISCONTINUED | OUTPATIENT
Start: 2024-02-05 | End: 2024-02-22 | Stop reason: HOSPADM

## 2024-02-04 RX ORDER — LORATADINE 10 MG/1
10 TABLET ORAL DAILY
Status: DISCONTINUED | OUTPATIENT
Start: 2024-02-05 | End: 2024-02-22 | Stop reason: HOSPADM

## 2024-02-04 RX ADMIN — BENZTROPINE MESYLATE 0.5 MG: 0.5 TABLET ORAL at 17:44

## 2024-02-04 RX ADMIN — PRAVASTATIN SODIUM 80 MG: 40 TABLET ORAL at 16:23

## 2024-02-04 RX ADMIN — GABAPENTIN 300 MG: 300 CAPSULE ORAL at 16:23

## 2024-02-04 RX ADMIN — LEVETIRACETAM 1500 MG: 500 TABLET, FILM COATED ORAL at 21:43

## 2024-02-04 RX ADMIN — GABAPENTIN 300 MG: 300 CAPSULE ORAL at 21:44

## 2024-02-04 RX ADMIN — ZONISAMIDE 300 MG: 100 CAPSULE ORAL at 21:44

## 2024-02-04 RX ADMIN — DIVALPROEX SODIUM 1000 MG: 500 TABLET, DELAYED RELEASE ORAL at 21:45

## 2024-02-04 RX ADMIN — DICLOFENAC SODIUM 2 G: 10 GEL TOPICAL at 21:48

## 2024-02-04 RX ADMIN — CALCIUM 1 TABLET: 500 TABLET ORAL at 16:23

## 2024-02-04 RX ADMIN — DOCUSATE SODIUM 100 MG: 100 CAPSULE, LIQUID FILLED ORAL at 17:44

## 2024-02-04 RX ADMIN — HYDROXYZINE HYDROCHLORIDE 25 MG: 50 TABLET, FILM COATED ORAL at 17:44

## 2024-02-04 RX ADMIN — HEPARIN SODIUM 5000 UNITS: 5000 INJECTION INTRAVENOUS; SUBCUTANEOUS at 21:45

## 2024-02-04 RX ADMIN — LACOSAMIDE 200 MG: 50 TABLET ORAL at 21:44

## 2024-02-04 RX ADMIN — ACETAMINOPHEN 650 MG: 325 TABLET ORAL at 19:27

## 2024-02-04 RX ADMIN — QUETIAPINE 400 MG: 400 TABLET, FILM COATED, EXTENDED RELEASE ORAL at 21:48

## 2024-02-04 RX ADMIN — HEPARIN SODIUM 5000 UNITS: 5000 INJECTION INTRAVENOUS; SUBCUTANEOUS at 16:23

## 2024-02-04 RX ADMIN — DICLOFENAC SODIUM 2 G: 10 GEL TOPICAL at 10:41

## 2024-02-04 RX ADMIN — TEMAZEPAM 15 MG: 7.5 CAPSULE ORAL at 21:43

## 2024-02-04 RX ADMIN — CALCIUM 1 TABLET: 500 TABLET ORAL at 21:44

## 2024-02-04 NOTE — ED PROVIDER NOTES
"History  Chief Complaint   Patient presents with    Hip Pain     Right hip pain s/p fall mechanical fall described as \"he stumbled, hit the dresser, and fell to the ground\". Pt and caregiver deny use of blood thinners.  Pt also c/o right knee pain       58-year-old male presents to the emergency department accompanied by caregiver with concern for right hip and right knee pain.  Patient reportedly had a mechanical fall where he stumbled into a dresser and then fell to the floor.  No reported head injury.  Patient has a history of cerebral palsy and is largely nonverbal.  He is overall well-appearing in no distress.  Patient does indicate right hip pain when attempting to stand.  There is no gross deformity appreciated.  He appears to be moving extremities appropriately.  Fall was apparently earlier today.        Prior to Admission Medications   Prescriptions Last Dose Informant Patient Reported? Taking?   Blood Glucose Monitoring Suppl (ONETOUCH VERIO) w/Device KIT  Care Giver No No   Sig: by Does not apply route 3 (three) times a day TEST BLOOD SUGARS THREE TIMES   Patient not taking: Reported on 7/7/2023   Depakote 500 MG DR tablet  Care Giver No No   Sig: Take 1 tab in the morning and 2 tabs at night. Brand necessary   Depakote  MG 24 hr tablet  Care Giver No No   Sig: Take 1 tablet (250 mg total) by mouth daily Take in the morning with one 500 mg tablet (total morning dose of 750 mg). BRAND ONLY   Disposable Gloves (Safe-Sense Glove-Blk-Nitrl-L) MISC  Care Giver No No   Sig: Use 1 each 3 (three) times a day as needed (care taker assisting with soiled briefs)   Patient not taking: Reported on 9/28/2023   Incontinence Supply Disposable (Briefs Overnight Medium) MISC  Care Giver No No   Sig: Use in the morning   Patient not taking: Reported on 9/28/2023   Multiple Vitamin (Daily-Reina) TABS  Care Giver No No   Sig: Take 1 tablet by mouth daily Take at 8 AM   OneTouch Delica Lancets 33G MISC  Care Giver No No "   Sig: by Does not apply route daily TEST BLOOD SUGARS THREE TIMES DAILY   Patient not taking: Reported on 1/24/2024   QUEtiapine (SEROquel XR) 200 mg 24 hr tablet  Care Giver No No   Sig: TAKE 1 TABLET BY MOUTH TWICE A DAY (8AM,2PM)   QUEtiapine (SEROquel XR) 400 mg 24 hr tablet  Care Giver No No   Sig: TAKE 1 TABLET BY MOUTH AT BEDTIME (8PM) (DX: ANTIPSYCHOTIC)   benztropine (COGENTIN) 0.5 mg tablet  Care Giver No No   Sig: Take 1 tablet (0.5 mg total) by mouth 2 (two) times a day   calcium carbonate (OYSTER SHELL,OSCAL) 500 mg  Care Giver No No   Sig: Take 1 tablet by mouth 3 (three) times a day   cholecalciferol (VITAMIN D3) 1,000 units tablet  Care Giver No No   Sig: Take 1 tablet (1,000 Units total) by mouth daily   clonazePAM (KlonoPIN) 0.25 MG disintegrating tablet  Care Giver No No   Sig: Take 1 tablet (0.25 mg total) by mouth as needed for seizures (clusters of myoclonic jerking (more than 2 myoclonic jerks in a day)) for up to 1 dose   docusate sodium (COLACE) 100 mg capsule  Care Giver No No   Sig: Take 1 capsule (100 mg total) by mouth 2 (two) times a day as needed for constipation   gabapentin (NEURONTIN) 300 mg capsule  Care Giver Yes No   Sig: Take 300 mg by mouth 3 (three) times a day   hydrOXYzine pamoate (VISTARIL) 25 mg capsule  Care Giver No No   Sig: Give 1 capsule PO in the AM and around 2PM for anxiety   ibuprofen (MOTRIN) 600 mg tablet  Care Giver Yes No   Sig: Take 600 mg by mouth every 6 (six) hours as needed for mild pain   lacosamide (VIMPAT) 150 mg tablet  Care Giver No No   Sig: Take 1 tab (150 mg) in the morning.   lacosamide (VIMPAT) 200 mg tablet  Care Giver No No   Sig: Take 1 tab (200 mg) at night.   levETIRAcetam (KEPPRA) 750 mg tablet  Care Giver No No   Sig: GIVE 2 TABLETS BY MOUTH TWICE DAILY (=1500MG) FOR SEIZURE DISORDER DR. LIVE JONES   levothyroxine 150 mcg tablet  Care Giver No No   Sig: Take 1 tablet (150 mcg total) by mouth daily   lisinopril (ZESTRIL) 10 mg tablet   Care Giver No No   Sig: Take 1 tablet (10 mg total) by mouth daily   loratadine (CLARITIN) 10 mg tablet  Care Giver Yes No   Sig: Take 10 mg by mouth daily   metFORMIN (GLUCOPHAGE) 1000 MG tablet  Care Giver No No   Sig: Take 1 tablet (1,000 mg total) by mouth 2 (two) times a day with meals   neomycin-bacitracin-polymyxin (NEOSPORIN) 5-400-5,000 ointment   No No   Sig: Apply topically 3 (three) times a day   simvastatin (ZOCOR) 40 mg tablet  Care Giver No No   Sig: Take 1 tablet (40 mg total) by mouth daily at bedtime   temazepam (RESTORIL) 15 mg capsule  Care Giver No No   Sig: Take 1 capsule (15 mg total) by mouth daily at bedtime as needed for sleep   vitamin B-12 (VITAMIN B-12) 1,000 mcg tablet  Care Giver No No   Sig: Take 1 tablet (1,000 mcg total) by mouth daily   Patient not taking: Reported on 1/5/2024   zonisamide (ZONEGRAN) 100 mg capsule  Care Giver No No   Sig: Take 4 capsules (400 mg) nightly.   Patient taking differently: No sig reported      Facility-Administered Medications: None       Past Medical History:   Diagnosis Date    ADRIANA (acute kidney injury) ([facility]) 9/24/2019    Bacteremia due to Gram-positive bacteria 9/7/2021    Buttock wound, left, subsequent encounter 11/5/2021    COVID-19     CP (cerebral palsy) ([facility])     Depression     Diabetes ([facility])     Disease of thyroid gland     Gait disorder     Gluteal abscess 9/6/2021    Hyperlipidemia     Hypertension     Kidney failure     Kidney stones     Moderate protein-calorie malnutrition ([facility]) 12/22/2021    Osteoporosis     Pressure injury of left buttock, stage 4 ([facility]) 3/9/2022    Psychiatric disorder     Scoliosis     Seizures ([facility])     Sepsis ([facility]) 9/25/2019    Thyroid disease     UTI (urinary tract infection)        Past Surgical History:   Procedure Laterality Date    APPENDECTOMY      IR PICC PLACEMENT SINGLE LUMEN  9/13/2021    IR PICC PLACEMENT SINGLE LUMEN  9/16/2021       No family history on file.  I have reviewed and agree with the history  as documented.    E-Cigarette/Vaping    E-Cigarette Use Never User      E-Cigarette/Vaping Substances    Nicotine No     THC No     CBD No     Flavoring No     Other No     Unknown No      Social History     Tobacco Use    Smoking status: Never    Smokeless tobacco: Never   Vaping Use    Vaping status: Never Used   Substance Use Topics    Alcohol use: Never    Drug use: No       Review of Systems   Unable to perform ROS: Patient nonverbal       Physical Exam  Physical Exam  Vitals and nursing note reviewed.   Constitutional:       General: He is not in acute distress.     Appearance: Normal appearance. He is well-developed and well-groomed.   HENT:      Head: Normocephalic and atraumatic.      Right Ear: External ear normal.      Left Ear: External ear normal.      Nose: Nose normal.      Mouth/Throat:      Lips: Pink.   Eyes:      General: Lids are normal. Gaze aligned appropriately.   Cardiovascular:      Rate and Rhythm: Normal rate.      Pulses: Normal pulses.   Pulmonary:      Effort: Pulmonary effort is normal. No respiratory distress.   Abdominal:      Palpations: Abdomen is soft.      Tenderness: There is no abdominal tenderness.   Musculoskeletal:      Comments: No specific tenderness elicited on physical exam or palpation of affected joints.   Skin:     General: Skin is warm.      Capillary Refill: Capillary refill takes less than 2 seconds.   Neurological:      General: No focal deficit present.      Mental Status: He is alert and oriented to person, place, and time. Mental status is at baseline.   Psychiatric:         Behavior: Behavior is cooperative.         Vital Signs  ED Triage Vitals   Temperature Pulse Respirations Blood Pressure SpO2   02/04/24 0909 02/04/24 0909 02/04/24 0909 02/04/24 0909 02/04/24 0909   (!) 97 °F (36.1 °C) 86 18 141/66 96 %      Temp Source Heart Rate Source Patient Position - Orthostatic VS BP Location FiO2 (%)   02/04/24 0909 02/04/24 1301 02/04/24 1301 02/04/24 1301 --    Temporal Monitor Lying Left arm       Pain Score       02/04/24 0909       5           Vitals:    02/04/24 0909 02/04/24 1301 02/04/24 1402   BP: 141/66 129/66 129/85   Pulse: 86 102 87   Patient Position - Orthostatic VS:  Lying          Visual Acuity      ED Medications  Medications   Diclofenac Sodium (VOLTAREN) 1 % topical gel 2 g (2 g Topical Given 2/4/24 1041)   QUEtiapine (SEROquel XR) 24 hr tablet 200 mg (has no administration in time range)   QUEtiapine (SEROquel XR) 24 hr tablet 400 mg (has no administration in time range)   pravastatin (PRAVACHOL) tablet 80 mg (80 mg Oral Given 2/4/24 1623)   cholecalciferol (VITAMIN D3) tablet 1,000 Units (has no administration in time range)   calcium carbonate (OYSTER SHELL,OSCAL) 500 mg tablet 1 tablet (1 tablet Oral Given 2/4/24 1623)   multivitamin stress formula tablet 1 tablet (has no administration in time range)   lisinopril (ZESTRIL) tablet 10 mg (has no administration in time range)   divalproex sodium (DEPAKOTE) DR tablet 750 mg (has no administration in time range)   divalproex sodium (DEPAKOTE) DR tablet 1,000 mg (has no administration in time range)   levothyroxine tablet 150 mcg (has no administration in time range)   docusate sodium (COLACE) capsule 100 mg (has no administration in time range)   benztropine (COGENTIN) tablet 0.5 mg (has no administration in time range)   temazepam (RESTORIL) capsule 15 mg (has no administration in time range)   lacosamide (VIMPAT) tablet 150 mg (has no administration in time range)   lacosamide (VIMPAT) tablet 200 mg (has no administration in time range)   levETIRAcetam (KEPPRA) tablet 1,500 mg (has no administration in time range)   gabapentin (NEURONTIN) capsule 300 mg (300 mg Oral Given 2/4/24 1623)   cyanocobalamin (VITAMIN B-12) tablet 1,000 mcg (has no administration in time range)   loratadine (CLARITIN) tablet 10 mg (has no administration in time range)   hydrOXYzine HCL (ATARAX) tablet 25 mg (has no  administration in time range)   zonisamide (ZONEGRAN) capsule 300 mg (has no administration in time range)   acetaminophen (TYLENOL) tablet 650 mg (has no administration in time range)   heparin (porcine) subcutaneous injection 5,000 Units (5,000 Units Subcutaneous Given 2/4/24 1623)   insulin lispro (HumaLOG) 100 units/mL subcutaneous injection 1-5 Units (has no administration in time range)   insulin lispro (HumaLOG) 100 units/mL subcutaneous injection 1-5 Units (has no administration in time range)       Diagnostic Studies  Results Reviewed       None                   CT lower extremity wo contrast right   Final Result by Didi Antunez MD (02/04 1142)      There is mildly comminuted and nondisplaced fractures of the right greater trochanter.      No evidence of femoral neck fracture      Avascular necrosis right femoral head with a subchondral fracture and mild flattening of the femoral head due to evolving subchondral collapse      The study was marked in EPIC for immediate notification.         Workstation performed: [id number]         XR knee 4+ vw right injury    (Results Pending)   XR hip/pelv 2-3 vws right if performed    (Results Pending)              Procedures  Procedures         ED Course                               SBIRT 22yo+      Flowsheet Row Most Recent Value   Initial Alcohol Screen: US AUDIT-C     1. How often do you have a drink containing alcohol? 0 Filed at: 02/04/2024 0907   2. How many drinks containing alcohol do you have on a typical day you are drinking?  0 Filed at: 02/04/2024 0907   3a. Male UNDER 65: How often do you have five or more drinks on one occasion? 0 Filed at: 02/04/2024 0907   3b. FEMALE Any Age, or MALE 65+: How often do you have 4 or more drinks on one occassion? 0 Filed at: 02/04/2024 0907   Audit-C Score 0 Filed at: 02/04/2024 0907                      Medical Decision Making  Right greater trochanter fracture.  Patient is unable to ambulate or tolerate  weightbearing on the right hip.  Would recommend admission and PT OT eval with possible rehab placement.  Fracture reviewed in brief with Dr. Villarreal of orthopedics    Amount and/or Complexity of Data Reviewed  Labs: ordered.  Radiology: ordered.    Risk  OTC drugs.  Prescription drug management.  Decision regarding hospitalization.             Disposition  Final diagnoses:   Fracture of greater trochanter of right femur (HCC)   Right knee pain   Ambulatory dysfunction   Nondisplaced fracture of greater trochanter of right femur, initial encounter for closed fracture (HCC)     Time reflects when diagnosis was documented in both MDM as applicable and the Disposition within this note       Time User Action Codes Description Comment    2/4/2024 12:33 PM Jose Angel Carpenter Add [S72.111A] Fracture of greater trochanter of right femur (HCC)     2/4/2024 12:33 PM Jose Angel Carpenter Add [M25.561] Right knee pain     2/4/2024 12:57 PM Jose Angel Carpenter Add [R26.2] Ambulatory dysfunction     2/4/2024  1:46 PM Ashley Good Add [S72.114A] Nondisplaced fracture of greater trochanter of right femur, initial encounter for closed fracture (HCC)           ED Disposition       ED Disposition   Admit    Condition   Stable    Date/Time   Sun Feb 4, 2024 1257    Comment   Case was discussed with Dr. Kennedy and the patient's admission status was agreed to be Admission Status: observation status to the service of Dr. Kennedy .               Follow-up Information       Follow up With Specialties Details Why Contact Info Additional Information    [facility]   [address] [place] [id number] [phone] [facility] [place] [address], [place], [place], [address] [phone]    Jairon Villarreal,  Orthopedic Surgery   [address] [place] [id number]  [phone]                Current Discharge Medication List        CONTINUE these medications which have NOT CHANGED    Details   benztropine (COGENTIN) 0.5 mg tablet Take 1 tablet (0.5 mg total) by mouth 2 (two) times a day  Qty: 180 tablet, Refills: 1    Associated Diagnoses: Parkinson's plus syndrome      Blood Glucose Monitoring Suppl (ONETOUCH VERIO) w/Device KIT by Does not apply route 3 (three) times a day TEST BLOOD SUGARS THREE TIMES  Qty: 1 kit, Refills: 0    Associated Diagnoses: Diabetes mellitus without complication ([facility]); Diabetes mellitus due to underlying condition with hyperosmolarity without coma, without long-term current use of insulin ([facility])      calcium carbonate (OYSTER SHELL,OSCAL) 500 mg Take 1 tablet by mouth 3 (three) times a day  Qty: 240 tablet, Refills: 3    Associated Diagnoses: Diabetes mellitus due to underlying condition with hyperosmolarity without coma, without long-term current use of insulin ([facility]); Hyperlipidemia, unspecified hyperlipidemia type      cholecalciferol (VITAMIN D3) 1,000 units tablet Take 1 tablet (1,000 Units total) by mouth daily  Qty: 90 tablet, Refills: 3    Associated Diagnoses: Vitamin D deficiency      clonazePAM (KlonoPIN) 0.25 MG disintegrating tablet Take 1 tablet (0.25 mg total) by mouth as needed for seizures (clusters of myoclonic jerking (more than 2 myoclonic jerks in a day)) for up to 1 dose  Qty: 10 tablet, Refills: 2    Associated Diagnoses: Generalized convulsive epilepsy (HCC)      Depakote 500 MG DR tablet Take 1 tab in the morning and 2 tabs at night. Brand necessary  Qty: 90 tablet, Refills: 11    Associated Diagnoses: Generalized convulsive epilepsy (HCC)      Depakote  MG 24 hr tablet Take 1 tablet (250 mg total) by mouth daily Take in the morning with one 500 mg tablet (total morning dose of 750 mg). BRAND ONLY  Qty: 90 tablet, Refills: 1    Associated Diagnoses: Generalized convulsive epilepsy (HCC)      Disposable Gloves (Safe-Sense Glove-Blk-Nitrl-L)  MISC Use 1 each 3 (three) times a day as needed (care taker assisting with soiled briefs)  Qty: 100 each, Refills: 1    Associated Diagnoses: Behavior concern in adult; Urinary incontinence, unspecified type      docusate sodium (COLACE) 100 mg capsule Take 1 capsule (100 mg total) by mouth 2 (two) times a day as needed for constipation  Qty: 180 capsule, Refills: 1    Associated Diagnoses: Constipation, unspecified constipation type      gabapentin (NEURONTIN) 300 mg capsule Take 300 mg by mouth 3 (three) times a day      hydrOXYzine pamoate (VISTARIL) 25 mg capsule Give 1 capsule PO in the AM and around 2PM for anxiety  Qty: 56 capsule, Refills: 0    Associated Diagnoses: Anxiety      ibuprofen (MOTRIN) 600 mg tablet Take 600 mg by mouth every 6 (six) hours as needed for mild pain      Incontinence Supply Disposable (Briefs Overnight Medium) MISC Use in the morning  Qty: 20 each, Refills: 11    Associated Diagnoses: Urinary incontinence, unspecified type      !! lacosamide (VIMPAT) 150 mg tablet Take 1 tab (150 mg) in the morning.  Qty: 30 tablet, Refills: 5    Associated Diagnoses: Generalized convulsive epilepsy (HCC)      !! lacosamide (VIMPAT) 200 mg tablet Take 1 tab (200 mg) at night.  Qty: 30 tablet, Refills: 5    Associated Diagnoses: Generalized convulsive epilepsy (HCC)      levETIRAcetam (KEPPRA) 750 mg tablet GIVE 2 TABLETS BY MOUTH TWICE DAILY (=1500MG) FOR SEIZURE DISORDER DR. LIVE JOENS  Qty: 120 tablet, Refills: 11    Associated Diagnoses: Involuntary movements      levothyroxine 150 mcg tablet Take 1 tablet (150 mcg total) by mouth daily  Qty: 90 tablet, Refills: 3    Associated Diagnoses: Hypothyroidism due to Hashimoto's thyroiditis      lisinopril (ZESTRIL) 10 mg tablet Take 1 tablet (10 mg total) by mouth daily  Qty: 90 tablet, Refills: 1    Associated Diagnoses: Hypertension, unspecified type      loratadine (CLARITIN) 10 mg tablet Take 10 mg by mouth daily      metFORMIN (GLUCOPHAGE) 1000  MG tablet Take 1 tablet (1,000 mg total) by mouth 2 (two) times a day with meals  Qty: 180 tablet, Refills: 0    Associated Diagnoses: Diabetes mellitus due to underlying condition with hyperosmolarity without coma, without long-term current use of insulin ([facility])      Multiple Vitamin (Daily-Reina) TABS Take 1 tablet by mouth daily Take at 8 AM  Qty: 90 tablet, Refills: 11    Associated Diagnoses: Diabetes mellitus due to underlying condition with hyperosmolarity without coma, without long-term current use of insulin ([facility]); Hyperlipidemia, unspecified hyperlipidemia type; Seizure (HCC); Neuropathy; Hypertension, unspecified type      neomycin-bacitracin-polymyxin (NEOSPORIN) 5-400-5,000 ointment Apply topically 3 (three) times a day  Qty: 3.5 g, Refills: 0    Associated Diagnoses: Scalp laceration, sequela      OneTouch Delica Lancets 33G MISC by Does not apply route daily TEST BLOOD SUGARS THREE TIMES DAILY  Qty: 100 each, Refills: 2    Associated Diagnoses: Diabetes mellitus due to underlying condition with hyperosmolarity without coma, without long-term current use of insulin ([facility])      !! QUEtiapine (SEROquel XR) 200 mg 24 hr tablet TAKE 1 TABLET BY MOUTH TWICE A DAY (8AM,2PM)  Qty: 180 tablet, Refills: 1    Associated Diagnoses: Generalized convulsive epilepsy (HCC); Behavior concern in adult      !! QUEtiapine (SEROquel XR) 400 mg 24 hr tablet TAKE 1 TABLET BY MOUTH AT BEDTIME (8PM) (DX: ANTIPSYCHOTIC)  Qty: 90 tablet, Refills: 1    Associated Diagnoses: Generalized convulsive epilepsy (HCC); Behavior concern in adult      simvastatin (ZOCOR) 40 mg tablet Take 1 tablet (40 mg total) by mouth daily at bedtime  Qty: 90 tablet, Refills: 1    Associated Diagnoses: Hyperlipidemia, unspecified hyperlipidemia type      temazepam (RESTORIL) 15 mg capsule Take 1 capsule (15 mg total) by mouth daily at bedtime as needed for sleep  Qty: 30 capsule, Refills: 1    Associated Diagnoses: Primary insomnia      vitamin B-12  (VITAMIN B-12) 1,000 mcg tablet Take 1 tablet (1,000 mcg total) by mouth daily  Qty: 30 tablet, Refills: 5    Associated Diagnoses: B12 deficiency      zonisamide (ZONEGRAN) 100 mg capsule Take 4 capsules (400 mg) nightly.  Qty: 120 capsule, Refills: 11    Associated Diagnoses: Generalized convulsive epilepsy (HCC)       !! - Potential duplicate medications found. Please discuss with provider.          No discharge procedures on file.    PDMP Review         Value Time User    PDMP Reviewed  Yes 9/1/2023  2:41 PM HUNTER Hopson            ED Provider  Electronically Signed by             Jose Angel Carpenter PA-C  02/04/24 [id number]       Jose Angel Carpenter PA-C  02/04/24 1744

## 2024-02-04 NOTE — PLAN OF CARE
Problem: Potential for Falls  Goal: Patient will remain free of falls  Description: INTERVENTIONS:  - Educate patient/family on patient safety including physical limitations  - Instruct patient to call for assistance with activity   - Consult OT/PT to assist with strengthening/mobility   - Keep Call bell within reach  - Keep bed low and locked with side rails adjusted as appropriate  - Keep care items and personal belongings within reach  - Initiate and maintain comfort rounds  - Make Fall Risk Sign visible to staff  - Offer Toileting every 2 Hours, in advance of need  - Initiate/Maintain alarms  - Obtain necessary fall risk management equipment:   - Apply yellow socks and bracelet for high fall risk patients  - Consider moving patient to room near nurses station  Outcome: Progressing

## 2024-02-04 NOTE — ASSESSMENT & PLAN NOTE
"Lab Results   Component Value Date    HGBA1C 5.5 11/27/2023       No results for input(s): \"POCGLU\" in the last 72 hours.    Blood Sugar Average: Last 72 hrs:    Mechanical soft carb controlled diet  Fingerstick blood sugar with sliding scale coverage  Hold home metformin while hospitalized  Continue Neurontin  Hypoglycemia protocol  "

## 2024-02-04 NOTE — H&P
"[facility]  H&P  Name: Jairon Oconnell 58 y.o. male I MRN: [id number]  Unit/Bed#: -01 I Date of Admission: 2/4/2024   Date of Service: 2/4/2024 I Hospital Day: 0      Assessment/Plan     * Nondisplaced fracture of greater trochanter of right femur, initial encounter for closed fracture (HCC)  Assessment & Plan  Mechanical fall today sustaining fracture of greater trochanter of right femur  CT right lower extremity: There is mildly comminuted and nondisplaced fractures of the right greater trochanter. No evidence of femoral neck fracture Avascular necrosis right femoral head with a subchondral fracture and mild flattening of the femoral head due to evolving subchondral collapse   Admitted to medicine  Neurovascular checks  Pain control PRN with tylenol to start, assess response and adjust medications as needed  Continue Voltaren gel QID  Orthopedic consultation    Behavior concern in adult  Assessment & Plan  With impulsive behavior at times  Continue Seroquel 200 mg at 8 AM and 2 PM and 400 mg at bedtime  Continue hydroxyzine 25 mg twice daily  Continue benztropine 0.5 mg twice daily  Supportive care    Acquired hypothyroidism  Assessment & Plan  Continue levothyroxine 150 mcg daily    Essential hypertension  Assessment & Plan  BP reviewed  Continue lisinopril 10 mg daily  Monitor blood pressure    Generalized convulsive epilepsy (HCC)  Assessment & Plan  Currently controlled  Continue Depakote 750 mg in the morning and at 1000 mg at bedtime, Vimpat 150 mg in the morning and 200 mg at bedtime, Keppra 750 mg BID, and zonisamide 300 mg at bedtime  Check Depakote level  Supportive care    Type 2 diabetes mellitus with diabetic neuropathy, without long-term current use of insulin (HCC)  Assessment & Plan  Lab Results   Component Value Date    HGBA1C 5.5 11/27/2023       No results for input(s): \"POCGLU\" in the last 72 hours.    Blood Sugar Average: Last 72 hrs:    Mechanical soft carb " controlled diet  Fingerstick blood sugar with sliding scale coverage  Hold home metformin while hospitalized  Continue Neurontin  Hypoglycemia protocol    Cerebral palsy (HCC)  Assessment & Plan  He lives in a group home and has caregiver  Supportive care         VTE Pharmacologic Prophylaxis: VTE Score: 6 High Risk (Score >/= 5) - Pharmacological DVT Prophylaxis Ordered: heparin. Sequential Compression Devices Ordered.  Code Status: Level 1 - Full Code   Discussion with family: Updated  (caregiver) at bedside.    Anticipated Length of Stay: Patient will be admitted on an inpatient basis with an anticipated length of stay of greater than 2 midnights secondary to right hip fracture.    Total Time Spent on Date of Encounter in care of patient: 55 mins. This time was spent on one or more of the following: performing physical exam; counseling and coordination of care; obtaining or reviewing history; documenting in the medical record; reviewing/ordering tests, medications or procedures; communicating with other healthcare professionals and discussing with patient's family/caregivers.    Chief Complaint: Right greater trochanter fracture    History of Present Illness:  Jairon Oconnell is a 58 y.o. male with a PMH of AAA, COVID-19 infection, cerebral palsy, depression, non-insulin-dependent type 2 diabetes, acquired hypothyroidism, essential hypertension, CKD, pressure injury, psychiatric disorder, seizure disorder, sepsis, and urinary tract infection who presents with fall and right greater trochanter fracture.  His caregiver was getting him ready for [employer] when he stumbled hit a dresser and then fell onto the floor.  CT of right lower extremity performed in the ER revealed right greater trochanter comminuted nondisplaced fracture.  He lives in a group home and has a 24-hour caregiver.  He is not able to return to the group home at this time.  He is admitted to medicine.  Unable to obtain full review of  systems from patient due to developmental delay/minimally verbal status.  Additional history was obtained from the caregiver at bedside.     Review of Systems:  Review of Systems   Unable to perform ROS: Patient nonverbal       Past Medical and Surgical History:   Past Medical History:   Diagnosis Date    ADRIANA (acute kidney injury) ([facility]) 9/24/2019    Bacteremia due to Gram-positive bacteria 9/7/2021    Buttock wound, left, subsequent encounter 11/5/2021    COVID-19     CP (cerebral palsy) ([facility])     Depression     Diabetes ([facility])     Disease of thyroid gland     Gait disorder     Gluteal abscess 9/6/2021    Hyperlipidemia     Hypertension     Kidney failure     Kidney stones     Moderate protein-calorie malnutrition ([facility]) 12/22/2021    Osteoporosis     Pressure injury of left buttock, stage 4 ([facility]) 3/9/2022    Psychiatric disorder     Scoliosis     Seizures ([facility])     Sepsis ([facility]) 9/25/2019    Thyroid disease     UTI (urinary tract infection)        Past Surgical History:   Procedure Laterality Date    APPENDECTOMY      IR PICC PLACEMENT SINGLE LUMEN  9/13/2021    IR PICC PLACEMENT SINGLE LUMEN  9/16/2021       Meds/Allergies:  Prior to Admission medications    Medication Sig Start Date End Date Taking? Authorizing Provider   benztropine (COGENTIN) 0.5 mg tablet Take 1 tablet (0.5 mg total) by mouth 2 (two) times a day 1/30/24   HUNTER Brown   Blood Glucose Monitoring Suppl (ONETOUCH VERIO) w/Device KIT by Does not apply route 3 (three) times a day TEST BLOOD SUGARS THREE TIMES  Patient not taking: Reported on 7/7/2023 7/31/20   HUNTER Fitzgerald   calcium carbonate (OYSTER SHELL,OSCAL) 500 mg Take 1 tablet by mouth 3 (three) times a day 1/30/24   HUNTER Brown   cholecalciferol (VITAMIN D3) 1,000 units tablet Take 1 tablet (1,000 Units total) by mouth daily 1/30/24   HUNTER Brown   clonazePAM (KlonoPIN) 0.25 MG disintegrating tablet Take 1 tablet (0.25 mg total) by mouth as needed for  seizures (clusters of myoclonic jerking (more than 2 myoclonic jerks in a day)) for up to 1 dose 5/19/23   Live Banerjee MD   Depakote 500 MG DR tablet Take 1 tab in the morning and 2 tabs at night. Brand necessary 1/24/24   Live Banerjee MD   Depakote  MG 24 hr tablet Take 1 tablet (250 mg total) by mouth daily Take in the morning with one 500 mg tablet (total morning dose of 750 mg). BRAND ONLY 1/24/24   Live Banerjee MD   Disposable Gloves (Safe-Sense Glove-Blk-Nitrl-L) MISC Use 1 each 3 (three) times a day as needed (care taker assisting with soiled briefs)  Patient not taking: Reported on 9/28/2023 7/26/23   HUNTER Brown   docusate sodium (COLACE) 100 mg capsule Take 1 capsule (100 mg total) by mouth 2 (two) times a day as needed for constipation 1/30/24   HUNTER Brown   gabapentin (NEURONTIN) 300 mg capsule Take 300 mg by mouth 3 (three) times a day 3/30/22   Historical Provider, MD   hydrOXYzine pamoate (VISTARIL) 25 mg capsule Give 1 capsule PO in the AM and around 2PM for anxiety 7/26/23   HUNTER Brown   ibuprofen (MOTRIN) 600 mg tablet Take 600 mg by mouth every 6 (six) hours as needed for mild pain    Historical Provider, MD   Incontinence Supply Disposable (Briefs Overnight Medium) MISC Use in the morning  Patient not taking: Reported on 9/28/2023 7/26/23   HUNTER Brown   lacosamide (VIMPAT) 150 mg tablet Take 1 tab (150 mg) in the morning. 1/24/24   Live Banerjee MD   lacosamide (VIMPAT) 200 mg tablet Take 1 tab (200 mg) at night. 1/24/24   Live Banerjee MD   levETIRAcetam (KEPPRA) 750 mg tablet GIVE 2 TABLETS BY MOUTH TWICE DAILY (=1500MG) FOR SEIZURE DISORDER DR. LIVE BANERJEE 1/24/24   Live Banerjee MD   levothyroxine 150 mcg tablet Take 1 tablet (150 mcg total) by mouth daily 1/30/24   HUNTER Brown   lisinopril (ZESTRIL) 10 mg tablet Take 1 tablet (10 mg total) by mouth daily 1/30/24   HUNTER Brown   loratadine (CLARITIN) 10 mg tablet Take  10 mg by mouth daily    Historical Provider, MD   metFORMIN (GLUCOPHAGE) 1000 MG tablet Take 1 tablet (1,000 mg total) by mouth 2 (two) times a day with meals 1/30/24   HUNTER Brown   Multiple Vitamin (Daily-Reina) TABS Take 1 tablet by mouth daily Take at 8 AM 4/3/23   HUNTER Brown   neomycin-bacitracin-polymyxin (NEOSPORIN) 5-400-5,000 ointment Apply topically 3 (three) times a day 2/2/24   HUNTER Brown   OneTouch Delica Lancets 33G MISC by Does not apply route daily TEST BLOOD SUGARS THREE TIMES DAILY  Patient not taking: Reported on 1/24/2024 7/31/20   HUNTER Fitzgerald   QUEtiapine (SEROquel XR) 200 mg 24 hr tablet TAKE 1 TABLET BY MOUTH TWICE A DAY (8AM,2PM) 7/26/23   HUNTER Brown   QUEtiapine (SEROquel XR) 400 mg 24 hr tablet TAKE 1 TABLET BY MOUTH AT BEDTIME (8PM) (DX: ANTIPSYCHOTIC) 3/28/22   HUNTER Brown   simvastatin (ZOCOR) 40 mg tablet Take 1 tablet (40 mg total) by mouth daily at bedtime 1/30/24   HUNTER Brown   temazepam (RESTORIL) 15 mg capsule Take 1 capsule (15 mg total) by mouth daily at bedtime as needed for sleep 3/28/22   HUNTER Brown   vitamin B-12 (VITAMIN B-12) 1,000 mcg tablet Take 1 tablet (1,000 mcg total) by mouth daily  Patient not taking: Reported on 1/5/2024 7/26/23   HUNTER Brown   zonisamide (ZONEGRAN) 100 mg capsule Take 4 capsules (400 mg) nightly.  Patient taking differently: No sig reported 11/17/23   Keyur Banerjee MD     I have reviewed home medications with a medical source (PCP, Pharmacy, other).    Allergies:   Allergies   Allergen Reactions    Depakote [Valproic Acid] Other (See Comments)     Must have brand name Depakote    Erythromycin Other (See Comments)     Unknown per pt    Penicillins Other (See Comments)     Unknown per pt       Social History:  Marital Status: Single   Occupation: Disabled  Patient Pre-hospital Living Situation: Home  Patient Pre-hospital Level of Mobility: walks with  person assist  Patient Pre-hospital Diet Restrictions: Mechanical soft carb controlled  Substance Use History:   Social History     Substance and Sexual Activity   Alcohol Use Never     Social History     Tobacco Use   Smoking Status Never   Smokeless Tobacco Never     Social History     Substance and Sexual Activity   Drug Use No       Family History:  No family history on file.    Physical Exam:     Vitals:   Blood Pressure: 129/85 (02/04/24 1402)  Pulse: 87 (02/04/24 1402)  Temperature: (!) 97.3 °F (36.3 °C) (02/04/24 1402)  Temp Source: Temporal (02/04/24 0909)  Respirations: 18 (02/04/24 1301)  Weight - Scale: 70.7 kg (155 lb 13.8 oz) (02/04/24 0909)  SpO2: 93 % (02/04/24 1402)    Physical Exam  Vitals and nursing note reviewed.   Constitutional:       General: He is not in acute distress.  HENT:      Head: Normocephalic and atraumatic.      Mouth/Throat:      Mouth: Mucous membranes are dry.      Pharynx: Oropharynx is clear.   Eyes:      Pupils: Pupils are equal, round, and reactive to light.   Cardiovascular:      Rate and Rhythm: Normal rate and regular rhythm.      Pulses: Normal pulses.   Pulmonary:      Effort: Pulmonary effort is normal. No respiratory distress.      Breath sounds: Normal breath sounds.   Abdominal:      General: Bowel sounds are normal.      Palpations: Abdomen is soft.      Tenderness: There is no abdominal tenderness.   Musculoskeletal:      Cervical back: Neck supple.      Right lower leg: No edema.      Left lower leg: No edema.      Comments: Tenderness over right hip and noted when patient attempted to move.  Distal tibia with hard raised area which is nontender to palpation and per caregiver is chronic   Skin:     General: Skin is warm and dry.      Capillary Refill: Capillary refill takes less than 2 seconds.      Comments: Scabbed abrasion to right knee   Neurological:      General: No focal deficit present.      Mental Status: He is alert. Mental status is at baseline.           Additional Data:     Lab Results:                            Lines/Drains:  Invasive Devices       None                       Imaging: Reviewed radiology reports from this admission including: CT lower extremity.  CT lower extremity wo contrast right   Final Result by Didi Antunez MD (02/04 1142)      There is mildly comminuted and nondisplaced fractures of the right greater trochanter.      No evidence of femoral neck fracture      Avascular necrosis right femoral head with a subchondral fracture and mild flattening of the femoral head due to evolving subchondral collapse      The study was marked in EPIC for immediate notification.         Workstation performed: [id number]         XR knee 4+ vw right injury    (Results Pending)   XR hip/pelv 2-3 vws right if performed    (Results Pending)       ** Please Note: This note has been constructed using a voice recognition system. **

## 2024-02-04 NOTE — ASSESSMENT & PLAN NOTE
Currently controlled  Continue Depakote 750 mg in the morning and at 1000 mg at bedtime, Vimpat 150 mg in the morning and 200 mg at bedtime, Keppra 750 mg BID, and zonisamide 300 mg at bedtime  Check Depakote level  Supportive care

## 2024-02-04 NOTE — ASSESSMENT & PLAN NOTE
With impulsive behavior at times  Continue Seroquel 200 mg at 8 AM and 2 PM and 400 mg at bedtime  Continue hydroxyzine 25 mg twice daily  Continue benztropine 0.5 mg twice daily  Supportive care

## 2024-02-04 NOTE — PHYSICAL THERAPY NOTE
Physical Therapy Cancellation Note         02/04/24 1401   PT Last Visit   PT Visit Date 02/04/24   Note Type   Note type Evaluation   Cancel Reasons Medical status  (R hip fracture with pending orthopedic consultation)     Sirena Devi,PT

## 2024-02-04 NOTE — ASSESSMENT & PLAN NOTE
Mechanical fall today sustaining fracture of greater trochanter of right femur  CT right lower extremity: There is mildly comminuted and nondisplaced fractures of the right greater trochanter. No evidence of femoral neck fracture Avascular necrosis right femoral head with a subchondral fracture and mild flattening of the femoral head due to evolving subchondral collapse   Admitted to medicine  Neurovascular checks  Pain control PRN with tylenol to start, assess response and adjust medications as needed  Continue Voltaren gel QID  Orthopedic consultation

## 2024-02-05 LAB
ANION GAP SERPL CALCULATED.3IONS-SCNC: 8 MMOL/L
BASOPHILS # BLD AUTO: 0.04 THOUSANDS/ÂΜL (ref 0–0.1)
BASOPHILS NFR BLD AUTO: 1 % (ref 0–1)
BUN SERPL-MCNC: 19 MG/DL (ref 5–25)
CALCIUM SERPL-MCNC: 9.7 MG/DL (ref 8.4–10.2)
CHLORIDE SERPL-SCNC: 99 MMOL/L (ref 96–108)
CO2 SERPL-SCNC: 29 MMOL/L (ref 21–32)
CREAT SERPL-MCNC: 0.81 MG/DL (ref 0.6–1.3)
EOSINOPHIL # BLD AUTO: 0.1 THOUSAND/ÂΜL (ref 0–0.61)
EOSINOPHIL NFR BLD AUTO: 1 % (ref 0–6)
ERYTHROCYTE [DISTWIDTH] IN BLOOD BY AUTOMATED COUNT: 12.8 % (ref 11.6–15.1)
GFR SERPL CREATININE-BSD FRML MDRD: 99 ML/MIN/1.73SQ M
GLUCOSE SERPL-MCNC: 110 MG/DL (ref 65–140)
GLUCOSE SERPL-MCNC: 130 MG/DL (ref 65–140)
GLUCOSE SERPL-MCNC: 80 MG/DL (ref 65–140)
GLUCOSE SERPL-MCNC: 84 MG/DL (ref 65–140)
GLUCOSE SERPL-MCNC: 94 MG/DL (ref 65–140)
HCT VFR BLD AUTO: 38.2 % (ref 36.5–49.3)
HGB BLD-MCNC: 12.9 G/DL (ref 12–17)
IMM GRANULOCYTES # BLD AUTO: 0.02 THOUSAND/UL (ref 0–0.2)
IMM GRANULOCYTES NFR BLD AUTO: 0 % (ref 0–2)
LYMPHOCYTES # BLD AUTO: 2.9 THOUSANDS/ÂΜL (ref 0.6–4.47)
LYMPHOCYTES NFR BLD AUTO: 38 % (ref 14–44)
MCH RBC QN AUTO: 32.3 PG (ref 26.8–34.3)
MCHC RBC AUTO-ENTMCNC: 33.8 G/DL (ref 31.4–37.4)
MCV RBC AUTO: 96 FL (ref 82–98)
MONOCYTES # BLD AUTO: 1.58 THOUSAND/ÂΜL (ref 0.17–1.22)
MONOCYTES NFR BLD AUTO: 21 % (ref 4–12)
NEUTROPHILS # BLD AUTO: 3.07 THOUSANDS/ÂΜL (ref 1.85–7.62)
NEUTS SEG NFR BLD AUTO: 39 % (ref 43–75)
NRBC BLD AUTO-RTO: 0 /100 WBCS
PLATELET # BLD AUTO: 164 THOUSANDS/UL (ref 149–390)
PMV BLD AUTO: 11.5 FL (ref 8.9–12.7)
POTASSIUM SERPL-SCNC: 4.2 MMOL/L (ref 3.5–5.3)
RBC # BLD AUTO: 3.99 MILLION/UL (ref 3.88–5.62)
SODIUM SERPL-SCNC: 136 MMOL/L (ref 135–147)
WBC # BLD AUTO: 7.71 THOUSAND/UL (ref 4.31–10.16)

## 2024-02-05 PROCEDURE — 85025 COMPLETE CBC W/AUTO DIFF WBC: CPT | Performed by: NURSE PRACTITIONER

## 2024-02-05 PROCEDURE — 80165 DIPROPYLACETIC ACID FREE: CPT | Performed by: NURSE PRACTITIONER

## 2024-02-05 PROCEDURE — 80048 BASIC METABOLIC PNL TOTAL CA: CPT | Performed by: NURSE PRACTITIONER

## 2024-02-05 PROCEDURE — 97167 OT EVAL HIGH COMPLEX 60 MIN: CPT

## 2024-02-05 PROCEDURE — 82948 REAGENT STRIP/BLOOD GLUCOSE: CPT

## 2024-02-05 PROCEDURE — 27246 TREAT THIGH FRACTURE: CPT | Performed by: ORTHOPAEDIC SURGERY

## 2024-02-05 PROCEDURE — 97163 PT EVAL HIGH COMPLEX 45 MIN: CPT

## 2024-02-05 PROCEDURE — 99222 1ST HOSP IP/OBS MODERATE 55: CPT | Performed by: ORTHOPAEDIC SURGERY

## 2024-02-05 PROCEDURE — 99232 SBSQ HOSP IP/OBS MODERATE 35: CPT | Performed by: HOSPITALIST

## 2024-02-05 RX ADMIN — HEPARIN SODIUM 5000 UNITS: 5000 INJECTION INTRAVENOUS; SUBCUTANEOUS at 21:19

## 2024-02-05 RX ADMIN — ZONISAMIDE 300 MG: 100 CAPSULE ORAL at 21:18

## 2024-02-05 RX ADMIN — HEPARIN SODIUM 5000 UNITS: 5000 INJECTION INTRAVENOUS; SUBCUTANEOUS at 05:00

## 2024-02-05 RX ADMIN — CALCIUM 1 TABLET: 500 TABLET ORAL at 20:47

## 2024-02-05 RX ADMIN — LACOSAMIDE 150 MG: 150 TABLET ORAL at 08:36

## 2024-02-05 RX ADMIN — ACETAMINOPHEN 650 MG: 325 TABLET ORAL at 15:53

## 2024-02-05 RX ADMIN — GABAPENTIN 300 MG: 300 CAPSULE ORAL at 15:13

## 2024-02-05 RX ADMIN — LEVETIRACETAM 1500 MG: 500 TABLET, FILM COATED ORAL at 08:28

## 2024-02-05 RX ADMIN — DICLOFENAC SODIUM 2 G: 10 GEL TOPICAL at 21:19

## 2024-02-05 RX ADMIN — GABAPENTIN 300 MG: 300 CAPSULE ORAL at 08:28

## 2024-02-05 RX ADMIN — QUETIAPINE 200 MG: 400 TABLET, FILM COATED, EXTENDED RELEASE ORAL at 15:00

## 2024-02-05 RX ADMIN — CYANOCOBALAMIN TAB 500 MCG 1000 MCG: 500 TAB at 08:27

## 2024-02-05 RX ADMIN — HYDROXYZINE HYDROCHLORIDE 25 MG: 50 TABLET, FILM COATED ORAL at 08:50

## 2024-02-05 RX ADMIN — B-COMPLEX W/ C & FOLIC ACID TAB 1 TABLET: TAB at 08:27

## 2024-02-05 RX ADMIN — BENZTROPINE MESYLATE 0.5 MG: 0.5 TABLET ORAL at 18:31

## 2024-02-05 RX ADMIN — QUETIAPINE 200 MG: 400 TABLET, FILM COATED, EXTENDED RELEASE ORAL at 08:50

## 2024-02-05 RX ADMIN — CALCIUM 1 TABLET: 500 TABLET ORAL at 08:27

## 2024-02-05 RX ADMIN — DOCUSATE SODIUM 100 MG: 100 CAPSULE, LIQUID FILLED ORAL at 18:31

## 2024-02-05 RX ADMIN — DICLOFENAC SODIUM 2 G: 10 GEL TOPICAL at 18:32

## 2024-02-05 RX ADMIN — CHOLECALCIFEROL TAB 25 MCG (1000 UNIT) 1000 UNITS: 25 TAB at 08:28

## 2024-02-05 RX ADMIN — LEVETIRACETAM 1500 MG: 500 TABLET, FILM COATED ORAL at 20:47

## 2024-02-05 RX ADMIN — CALCIUM 1 TABLET: 500 TABLET ORAL at 15:13

## 2024-02-05 RX ADMIN — LACOSAMIDE 200 MG: 50 TABLET ORAL at 21:19

## 2024-02-05 RX ADMIN — DICLOFENAC SODIUM 2 G: 10 GEL TOPICAL at 08:57

## 2024-02-05 RX ADMIN — DOCUSATE SODIUM 100 MG: 100 CAPSULE, LIQUID FILLED ORAL at 08:27

## 2024-02-05 RX ADMIN — LEVOTHYROXINE SODIUM 150 MCG: 75 TABLET ORAL at 05:00

## 2024-02-05 RX ADMIN — TEMAZEPAM 15 MG: 7.5 CAPSULE ORAL at 21:18

## 2024-02-05 RX ADMIN — BENZTROPINE MESYLATE 0.5 MG: 0.5 TABLET ORAL at 08:27

## 2024-02-05 RX ADMIN — PRAVASTATIN SODIUM 80 MG: 40 TABLET ORAL at 15:53

## 2024-02-05 RX ADMIN — LORATADINE 10 MG: 10 TABLET ORAL at 08:27

## 2024-02-05 RX ADMIN — HYDROXYZINE HYDROCHLORIDE 25 MG: 50 TABLET, FILM COATED ORAL at 15:00

## 2024-02-05 RX ADMIN — DICLOFENAC SODIUM 2 G: 10 GEL TOPICAL at 12:00

## 2024-02-05 RX ADMIN — GABAPENTIN 300 MG: 300 CAPSULE ORAL at 20:47

## 2024-02-05 RX ADMIN — DIVALPROEX SODIUM 750 MG: 250 TABLET, DELAYED RELEASE ORAL at 08:27

## 2024-02-05 RX ADMIN — DIVALPROEX SODIUM 1000 MG: 500 TABLET, DELAYED RELEASE ORAL at 21:18

## 2024-02-05 RX ADMIN — QUETIAPINE 400 MG: 400 TABLET, FILM COATED, EXTENDED RELEASE ORAL at 22:06

## 2024-02-05 RX ADMIN — LISINOPRIL 10 MG: 10 TABLET ORAL at 08:28

## 2024-02-05 RX ADMIN — HEPARIN SODIUM 5000 UNITS: 5000 INJECTION INTRAVENOUS; SUBCUTANEOUS at 15:00

## 2024-02-05 NOTE — ASSESSMENT & PLAN NOTE
Currently controlled  Continue Depakote 750 mg in the morning and at 1000 mg at bedtime, Vimpat 150 mg in the morning and 200 mg at bedtime, Keppra 750 mg BID, and zonisamide 300 mg at bedtime     [phone]

## 2024-02-05 NOTE — PHYSICAL THERAPY NOTE
Physical Therapy Evaluation   Time in: 0910  Time out: 0922  Total evaluation time: 12 minutes    Patient's Name: Jairon Oconnell    Admitting Diagnosis  Hip pain [M25.559]  Right knee pain [M25.561]  Ambulatory dysfunction [R26.2]  Fracture of greater trochanter of right femur ([facility]) [S72.111A]  Nondisplaced fracture of greater trochanter of right femur, initial encounter for closed fracture ([facility]) [S72.114A]    Problem List  Patient Active Problem List   Diagnosis    Cataract    Cerebral palsy ([facility])    Type 2 diabetes mellitus with diabetic neuropathy, without long-term current use of insulin (HCC)    Generalized convulsive epilepsy ([facility])    Hyperlipidemia    Essential hypertension    Acquired hypothyroidism    Osteoporosis    Scoliosis    Vitamin B12 deficiency    Vitamin D deficiency    Seborrheic dermatitis of scalp    Nearsightedness    Behavior concern in adult    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[facility])    Metabolic encephalopathy    Hyponatremia    Thrombocytopathia ([facility])    Hypomagnesemia    Social problem    Ambulatory dysfunction    Pressure ulcer of sacral region, stage 3 ([facility])    Closed nondisplaced fracture of proximal phalanx of left index finger with routine healing, subsequent encounter    Nondisplaced fracture of greater trochanter of right femur, initial encounter for closed fracture ([facility])       Past Medical History  Past Medical History:   Diagnosis Date    ADRIANA (acute kidney injury) ([facility]) 9/24/2019    Bacteremia due to Gram-positive bacteria 9/7/2021    Buttock wound, left, subsequent encounter 11/5/2021    COVID-19     CP (cerebral palsy) ([facility])     Depression     Diabetes ([facility])     Disease of thyroid gland     Gait disorder     Gluteal abscess 9/6/2021    Hyperlipidemia     Hypertension     Kidney failure     Kidney stones     Moderate protein-calorie malnutrition ([facility]) 12/22/2021    Osteoporosis     Pressure injury of left buttock, stage 4 ([facility]) 3/9/2022    Psychiatric disorder      Scoliosis     Seizures (HCC)     Sepsis (HCC) 9/25/2019    Thyroid disease     UTI (urinary tract infection)        Past Surgical History  Past Surgical History:   Procedure Laterality Date    APPENDECTOMY      IR PICC PLACEMENT SINGLE LUMEN  9/13/2021    IR PICC PLACEMENT SINGLE LUMEN  9/16/2021       PT performed at least 2 patient identifiers during session: Name and wristband.       02/05/24 0922   PT Last Visit   PT Visit Date 02/05/24   Note Type   Note type Evaluation   Pain Assessment   Pain Assessment Tool FLACC   Pain Rating: FLACC (Rest) - Face 0   Pain Rating: FLACC (Rest) - Legs 0   Pain Rating: FLACC (Rest) - Activity 0   Pain Rating: FLACC (Rest) - Cry 1   Pain Rating: FLACC (Rest) - Consolability 0   Score: FLACC (Rest) 1   Pain Rating: FLACC (Activity) - Face 1   Pain Rating: FLACC (Activity) - Legs 1   Pain Rating: FLACC (Activity) - Activity 2   Pain Rating: FLACC (Activity) - Cry 2   Pain Rating: FLACC (Activity) - Consolability 1   Score: FLACC (Activity) 7   Restrictions/Precautions   Weight Bearing Precautions Per Order Yes   RLE Weight Bearing Per Order (S)  WBAT  (+ no active hip ABDuction)   Braces or Orthoses   (none)   Other Precautions Cognitive;Chair Alarm;Bed Alarm;Seizure;Fall Risk;Pain;WBS   Home Living   Type of Home Group Home   Home Layout Two level;Performs ADLs on one level;Able to live on main level with bedroom/bathroom;Access  (0 DHARMESH)   Bathroom Equipment Shower chair   Bathroom Accessibility Accessible   Home Equipment Wheelchair-manual   Additional Comments pt is poor historian and unable to provide PLOF/social history. Information obtained from previous admission in chart.   Prior Function   Level of [place] Needs assistance with ADLs;Needs assistance with IADLS   Lives With   (caregiver)   Receives Help From   (staff at group home)   IADLs Family/Friend/Other provides transportation;Family/Friend/Other provides meals;Family/Friend/Other provides medication management  "  Falls in the last 6 months   (pt has + fall history per chart review)   General   Family/Caregiver Present No   Cognition   Overall Cognitive Status Impaired   Arousal/Participation Alert   Orientation Level Oriented to person;Disoriented to place;Disoriented to time;Disoriented to situation  (pt responds to first name, but unable to state birthday & last name)   Memory Unable to assess   Following Commands Follows one step commands with increased time or repetition   Subjective   Subjective \"I can't\"   RLE Assessment   RLE Assessment X   Strength RLE   RLE Overall Strength 3-/5  (suspected d/t pain)   LLE Assessment   LLE Assessment X   Strength LLE   LLE Overall Strength 3/5  (at least 3/5 observed c OOB mobility)   Coordination   Movements are Fluid and Coordinated 0   Bed Mobility   Supine to Sit 2  Maximal assistance   Additional items Assist x 1;HOB elevated;Increased time required;Verbal cues;LE management   Transfers   Sit to Stand 3  Moderate assistance   Additional items Assist x 2;Increased time required;Impulsive;Verbal cues   Stand to Sit 3  Moderate assistance   Additional items Assist x 2;Increased time required   Stand pivot 3  Moderate assistance   Additional items Assist x 2;Increased time required;Impulsive;Verbal cues   Ambulation/Elevation   Gait pattern Improper Weight shift;Antalgic;Narrow GORGE;Decreased foot clearance;Decreased R stance;Step to;Excessively slow   Gait Assistance 3  Moderate assist   Additional items Assist x 2;Verbal cues;Tactile cues  (cues for sequencing and advancement of RW)   Assistive Device Rolling walker   Distance 3 ft   Stair Management Assistance Not tested   Balance   Static Sitting Fair   Dynamic Sitting Poor +   Static Standing Poor +   Dynamic Standing Poor   Ambulatory Poor   Endurance Deficit   Endurance Deficit Yes   Activity Tolerance   Activity Tolerance Patient limited by pain;Treatment limited secondary to medical complications (Comment)  (cognition) "   Medical Staff Made Aware coordination of care provided with OT Abdulkadir   Nurse Made Aware Yes, RN Bridget made aware of outcomes/recs   Assessment   Prognosis Fair   Problem List Decreased strength;Decreased endurance;Impaired balance;Decreased mobility;Decreased cognition;Impaired judgement;Decreased safety awareness;Orthopedic restrictions;Pain   Assessment Pt is 58 y.o. male seen for high-complexity PT evaluation on 2/5/2024 s/p admit to [facility] on 2/4/2024 w/ Nondisplaced fracture of greater trochanter of right femur, initial encounter for closed fracture (HCC) s/p fall; per ortho pt is to be WBAT RLE + no active hip ABDuction. PT initially consulted to assess pt's functional mobility and d/c needs. Order placed for PT eval and tx, w/ up and OOB as tolerated order. PTA, pt resides with caregiver in a group home in 2  c 1st floor setup. At time of eval, pt requires max Ax1 for supine>sit, mod Ax2 for STS and short amb x 3 ft with RW. Upon evaluation, pt presenting with impaired functional mobility d/t decreased strength, decreased endurance, impaired balance, decreased mobility, decreased cognition, impaired judgement, decreased safety awareness, orthopedic restrictions, and pain. Pertinent PMHx and current co-morbidities affecting pt's physical performance at time of assessment include: h/o AAA, hypothyroidism, HTN, generalized convulsive epilepsy, DM type 2, cerebral palsy. Personal factors affecting pt at time of eval include: decreased cognition, positive fall history, unable to perform physical activity, and limited insight into impairments. Objective measures performed on IE also reveal limitations: AM-PAC 6-Clicks: 11/24. Pt's clinical presentation is currently unstable/unpredictable seen in pt's presentation of  severe R hip pain impacting overall mobility status, ongoing medical assessment, and decreased cognition . Overall, pt's rehab potential and prognosis to return to OF is fair  as impacted by objective findings, warranting pt to receive further skilled PT interventions to address identified impairments, activity limitation(s), and participation restriction(s). Pt is to benefit from continued PT tx to address deficits as defined above and maximize level of functional independent mobility and consistency in order for pt to improve safety with mobility tasks. From PT/mobility standpoint, recommendation at time of d/c would be level 2, moderate resource intensity PT services, pending pt's overall functional progress in order to facilitate return to PLOF and abilities.   Barriers to Discharge Inaccessible home environment   Goals   Patient Goals none expressed by pt d/t baseline cognitive status   [facility] Expiration Date 02/15/24   Short Term Goal #1 In 7-10 days: Increase bilateral LE strength 1/2 grade to facilitate independent mobility, Perform all bed mobility tasks with SBA of 1 to decrease caregiver burden, Perform all transfers with SBA of 1 to improve independence, Ambulate > 50 ft. with least restrictive assistive device with SBA of 1 w/o LOB and w/ normalized gait pattern 100% of the time, Increase all balance 1/2 grade to decrease risk for falls, Tolerate 4 hr OOB to faciliate upright tolerance and Complete % of the time   PT Treatment Day 0   Plan   Treatment/Interventions Functional transfer training;LE strengthening/ROM;Therapeutic exercise;Endurance training;Cognitive reorientation;Patient/family training;Equipment eval/education;Bed mobility;Gait training;Spoke to nursing;Spoke to case management;OT   PT Frequency 3-5x/wk   Discharge Recommendation   Rehab Resource Intensity Level, PT II (Moderate Resource Intensity)   Equipment Recommended   (continue TBD pending progress, RW at this time)   Additional Comments Pt's raw score on the AM-PAC Basic Mobility inpatient short form is 11, standardized score is 30.25. Patients at this level are likely to benefit from DC to post  acute rehabilitation services, however, please refer to therapist recommendation for safe DC planning.   AM-PAC Basic Mobility Inpatient   Turning in Flat Bed Without Bedrails 2   Lying on Back to Sitting on Edge of Flat Bed Without Bedrails 2   Moving Bed to Chair 2   Standing Up From Chair Using Arms 2   Walk in Room 2   Climb 3-5 Stairs With Railing 1   Basic Mobility Inpatient Raw Score 11   Basic Mobility Standardized Score 30.25   Highest Level Of Mobility   -HLM Goal 4: Move to chair/commode   -HLM Achieved 4: Move to chair/commode   End of Consult   Patient Position at End of Consult Bedside chair;Bed/Chair alarm activated;All needs within reach       Annabel Dorado, PT, DPT

## 2024-02-05 NOTE — ASSESSMENT & PLAN NOTE
Status post a mechanical fall prior to arrival   CT right lower extremity imaging confirmed a comminuted and nondisplaced fractures of the right greater trochanter with no evidence of femoral neck fracture.   Status post an orthopedic surgical evaluation  No indication at this time for an acute phase inpatient surgical intervention  Okay for weightbearing as tolerated  Continue current pain medication management regimen  Status post a PT and OT evaluation, they recommend postacute rehabilitation services  Case management is working on placement options  Patient is otherwise medically stable for discharge

## 2024-02-05 NOTE — PLAN OF CARE
Problem: Potential for Falls  Goal: Patient will remain free of falls  Description: INTERVENTIONS:  - Educate patient/family on patient safety including physical limitations  - Instruct patient to call for assistance with activity   - Consult OT/PT to assist with strengthening/mobility   - Keep Call bell within reach  - Keep bed low and locked with side rails adjusted as appropriate  - Keep care items and personal belongings within reach  - Initiate and maintain comfort rounds  - Make Fall Risk Sign visible to staff  - Offer Toileting every 2 Hours, in advance of need  - Initiate/Maintain bed alarm  - Obtain necessary fall risk management equipment: alarm  - Apply yellow socks and bracelet for high fall risk patients  - Consider moving patient to room near nurses station  Outcome: Progressing     Problem: PAIN - ADULT  Goal: Verbalizes/displays adequate comfort level or baseline comfort level  Description: Interventions:  - Encourage patient to monitor pain and request assistance  - Assess pain using appropriate pain scale  - Administer analgesics based on type and severity of pain and evaluate response  - Implement non-pharmacological measures as appropriate and evaluate response  - Consider cultural and social influences on pain and pain management  - Notify physician/advanced practitioner if interventions unsuccessful or patient reports new pain  Outcome: Progressing

## 2024-02-05 NOTE — PROGRESS NOTES
Critical access hospital  Progress Note  Name: Jairon Oconnell I  MRN: [id number]  Unit/Bed#: -01 I Date of Admission: 2/4/2024   Date of Service: 2/5/2024 I Hospital Day: 1    Assessment/Plan   * Nondisplaced fracture of greater trochanter of right femur, initial encounter for closed fracture (HCC)  Assessment & Plan  Status post a mechanical fall prior to arrival   CT right lower extremity imaging confirmed a comminuted and nondisplaced fractures of the right greater trochanter with no evidence of femoral neck fracture.   Status post an orthopedic surgical evaluation  No indication at this time for an acute phase inpatient surgical intervention  Okay for weightbearing as tolerated  Continue current pain medication management regimen  Status post a PT and OT evaluation, they recommend postacute rehabilitation services  Case management is working on placement options  Patient is otherwise medically stable for discharge    Cerebral palsy (HCC)  Assessment & Plan  He lives in a group home and has caregiver  Continue supportive care    Essential hypertension  Assessment & Plan  Blood pressure stable  Continue lisinopril    Generalized convulsive epilepsy (HCC)  Assessment & Plan  Currently controlled  Continue Depakote 750 mg in the morning and at 1000 mg at bedtime, Vimpat 150 mg in the morning and 200 mg at bedtime, Keppra 750 mg BID, and zonisamide 300 mg at bedtime      Acquired hypothyroidism  Assessment & Plan  Continue levothyroxine 150 mcg daily    Type 2 diabetes mellitus with diabetic neuropathy, without long-term current use of insulin (HCC)  Assessment & Plan  Lab Results   Component Value Date    HGBA1C 5.5 11/27/2023       Recent Labs     02/04/24  1611 02/04/24  2045 02/05/24  0708 02/05/24  1106   POCGLU 85 104 80 110       Blood Sugar Average: Last 72 hrs:  (P) 92  Oral hypoglycemic medications remain on hold  Target blood sugar for the hospital is 140-180  Continue Accu-Cheks before  meals and at bedtime with sliding scale insulin coverage  Diabetic neuropathy-continue gabapentin    Hyperlipidemia  Assessment & Plan  Continue pravastatin    Behavior concern in adult  Assessment & Plan  With impulsive behavior at times  Continue Seroquel 200 mg at 8 AM and 2 PM and 400 mg at bedtime  Continue hydroxyzine 25 mg twice daily  Continue benztropine 0.5 mg twice daily  Supportive care               VTE Pharmacologic Prophylaxis: VTE Score: 6 High Risk (Score >/= 5) - Pharmacological DVT Prophylaxis Ordered: heparin. Sequential Compression Devices Ordered.    Mobility:   Basic Mobility Inpatient Raw Score: 11  JH-HLM Goal: 4: Move to chair/commode  JH-HLM Achieved: 4: Move to chair/commode  HLM Goal NOT achieved. Continue with multidisciplinary rounding and encourage appropriate mobility to improve upon HLM goals.    Patient Centered Rounds: I performed bedside rounds with nursing staff today.   Discussions with Specialists or Other Care Team Provider: Orthopedic surgery    Education and Discussions with Family / Patient: Updated  (sister) via phone.    Total Time Spent on Date of Encounter in care of patient: 40 mins. This time was spent on one or more of the following: performing physical exam; counseling and coordination of care; obtaining or reviewing history; documenting in the medical record; reviewing/ordering tests, medications or procedures; communicating with other healthcare professionals and discussing with patient's family/caregivers.    Current Length of Stay: 1 day(s)  Current Patient Status: Inpatient   Certification Statement: The patient will continue to require additional inpatient hospital stay due to the need for placement  Discharge Plan:  Patient is medically stable for discharge, case management is working on rehab    Code Status: Level 1 - Full Code    Subjective:   Patient seen, sitting up in the chair, answers questions appropriately by nodding his head yes and  no, is at baseline from a mentation standpoint in the setting of having developmental delay secondary to cerebral palsy    Objective:     Vitals:   Temp (24hrs), Av.7 °F (36.5 °C), Min:97.3 °F (36.3 °C), Max:98.1 °F (36.7 °C)    Temp:  [97.3 °F (36.3 °C)-98.1 °F (36.7 °C)] 98.1 °F (36.7 °C)  HR:  [87-92] 92  Resp:  [20] 20  BP: (112-129)/(57-85) 112/57  SpO2:  [93 %-94 %] 93 %  Body mass index is 21.14 kg/m².     Input and Output Summary (last 24 hours):     Intake/Output Summary (Last 24 hours) at 2024 1337  Last data filed at 2024 1220  Gross per 24 hour   Intake 460 ml   Output 600 ml   Net -140 ml       Physical Exam:   Physical Exam  Vitals and nursing note reviewed.   Constitutional:       General: He is not in acute distress.     Appearance: Normal appearance. He is not ill-appearing.   HENT:      Head: Normocephalic and atraumatic.      Nose: Nose normal.   Eyes:      Extraocular Movements: Extraocular movements intact.      Pupils: Pupils are equal, round, and reactive to light.   Cardiovascular:      Rate and Rhythm: Normal rate and regular rhythm.      Pulses: Normal pulses.      Heart sounds: Normal heart sounds. No murmur heard.     No friction rub. No gallop.   Pulmonary:      Effort: Pulmonary effort is normal.      Breath sounds: Normal breath sounds.   Abdominal:      General: There is no distension.      Palpations: Abdomen is soft. There is no mass.      Tenderness: There is no abdominal tenderness. There is no guarding or rebound.   Musculoskeletal:         General: No swelling or tenderness. Normal range of motion.      Cervical back: Normal range of motion and neck supple. No rigidity. No muscular tenderness.      Right lower leg: No edema.      Left lower leg: No edema.   Skin:     General: Skin is warm.      Capillary Refill: Capillary refill takes less than 2 seconds.      Findings: No erythema or rash.   Neurological:      General: No focal deficit present.      Mental Status:  He is alert. Mental status is at baseline.      Comments: At baseline in the setting of having cerebral palsy   Psychiatric:         Mood and Affect: Mood normal.         Behavior: Behavior normal.          Additional Data:     Labs:  Results from last 7 days   Lab Units 02/05/24  0525   WBC Thousand/uL 7.71   HEMOGLOBIN g/dL 12.9   HEMATOCRIT % 38.2   PLATELETS Thousands/uL 164   NEUTROS PCT % 39*   LYMPHS PCT % 38   MONOS PCT % 21*   EOS PCT % 1     Results from last 7 days   Lab Units 02/05/24  0525   SODIUM mmol/L 136   POTASSIUM mmol/L 4.2   CHLORIDE mmol/L 99   CO2 mmol/L 29   BUN mg/dL 19   CREATININE mg/dL 0.81   ANION GAP mmol/L 8   CALCIUM mg/dL 9.7   GLUCOSE RANDOM mg/dL 84         Results from last 7 days   Lab Units 02/05/24  1106 02/05/24  0708 02/04/24  2045 02/04/24  1611 02/04/24  1406   POC GLUCOSE mg/dl 110 80 104 85 81               Lines/Drains:  Invasive Devices       Peripheral Intravenous Line  Duration             Peripheral IV 02/05/24 Left;Ventral (anterior) Forearm <1 day                          Imaging: No pertinent imaging reviewed.    Recent Cultures (last 7 days):         Last 24 Hours Medication List:   Current Facility-Administered Medications   Medication Dose Route Frequency Provider Last Rate    acetaminophen  650 mg Oral Q6H PRN HUNTER Main      benztropine  0.5 mg Oral BID Ashley Good, NATHENNP      calcium carbonate  1 tablet Oral TID HUNTER Main      cholecalciferol  1,000 Units Oral Daily Ashley Good, NATHENNP      vitamin B-12  1,000 mcg Oral Every Other Day HUNTER Main      Diclofenac Sodium  2 g Topical 4x Daily Jose Angel Carpenter PA-C      divalproex sodium  1,000 mg Oral HS Ashley Good, HUNTER      divalproex sodium  750 mg Oral Daily Ashley Good, NATHENNP      docusate sodium  100 mg Oral BID Ashley Good, CRNP      gabapentin  300 mg Oral TID HUNTER Main      heparin (porcine)  5,000 Units Subcutaneous Q8H [facility] Ashley  CAITLIN Florentino, CRNP      hydrOXYzine HCL  25 mg Oral BID Ashleyrin Good, CRNP      insulin lispro  1-5 Units Subcutaneous TID AC Ashley Good, CRNP      insulin lispro  1-5 Units Subcutaneous HS Ashley M Florentino, CRNP      lacosamide  150 mg Oral Daily Ashley M Florentino, CRNP      lacosamide  200 mg Oral HS Ashleyrin Laureanoust, CRNP      levETIRAcetam  1,500 mg Oral Q12H RONALD Ashley M Florentino, CRNP      levothyroxine  150 mcg Oral Early Morning Ashleyrin Lauraenoust, CRNP      lisinopril  10 mg Oral Daily Ashleyrin Good, CRNP      loratadine  10 mg Oral Daily Ashley Good, CRNP      multivitamin stress formula  1 tablet Oral Daily Ashleyrin Good, CRNP      pravastatin  80 mg Oral Daily With Dinner Ashley TUCKER Florentino, CRNP      QUEtiapine  200 mg Oral BID Ashley Good, CRNP      QUEtiapine  400 mg Oral HS Ashley Good, CRNP      temazepam  15 mg Oral HS PRN Ashley Good, CRNP      zonisamide  300 mg Oral HS Ashley Good, CRNP          Today, Patient Was Seen By: Giuliana Shetty MD    **Please Note: This note may have been constructed using a voice recognition system.**

## 2024-02-05 NOTE — PLAN OF CARE
Problem: OCCUPATIONAL THERAPY ADULT  Goal: Performs self-care activities at highest level of function for planned discharge setting.  See evaluation for individualized goals.  Description: Treatment Interventions: ADL retraining, Functional transfer training, UE strengthening/ROM, Endurance training, Patient/family training, Equipment evaluation/education, Compensatory technique education, Energy conservation, Activityengagement, Cognitive reorientation    See flowsheet documentation for full assessment, interventions and recommendations.   Note: Limitation: Decreased ADL status, Decreased UE strength, Decreased Safe judgement during ADL, Decreased endurance, Decreased self-care trans, Decreased high-level ADLs, Decreased cognition  Prognosis: Fair  Assessment: Pt is a 58 y.o. male seen for OT evaluation s/p admit to [facility] on 2/4/2024 w/ Nondisplaced fracture of greater trochanter of right femur, initial encounter for closed fracture (HCC).  Comorbidities affecting pt's functional performance at time of assessment include:  Cerebral palsy, DM II, Epilepsy, HLD, Osteoporosis, Scoliosis . Personal factors affecting pt at time of IE include:limited home support, difficulty performing ADLS, limited insight into deficits, and decreased initiation and engagement . Prior to admission, pt required A with ADLs. Upon evaluation: the following deficits impact occupational performance: decreased strength, decreased balance, decreased tolerance, impaired attention, impaired initiation, impaired memory, impaired sequencing, impaired problem solving, decreased safety awareness, increased pain, and decreased coping skills. Pt to benefit from continued skilled OT tx while in the hospital to address deficits as defined above and maximize level of functional independence w ADL's and functional mobility. Occupational Performance areas to address include: eating, bathing/shower, toilet hygiene, dressing, functional mobility, and  clothing management. From OT standpoint, recommendation at time of d/c would be Level II (Moderate Resource Intensity.     Rehab Resource Intensity Level, OT: II (Moderate Resource Intensity)     Abdulkadir Velasquez MS, OTR/L

## 2024-02-05 NOTE — OCCUPATIONAL THERAPY NOTE
Occupational Therapy Evaluation      Jairon MADERA Kourtney    2/5/2024    Principal Problem:    Nondisplaced fracture of greater trochanter of right femur, initial encounter for closed fracture ([facility])  Active Problems:    Cerebral palsy ([facility])    Type 2 diabetes mellitus with diabetic neuropathy, without long-term current use of insulin (HCC)    Generalized convulsive epilepsy (HCC)    Essential hypertension    Acquired hypothyroidism    Behavior concern in adult      Past Medical History:   Diagnosis Date    ADRIANA (acute kidney injury) ([facility]) 9/24/2019    Bacteremia due to Gram-positive bacteria 9/7/2021    Buttock wound, left, subsequent encounter 11/5/2021    COVID-19     CP (cerebral palsy) ([facility])     Depression     Diabetes ([facility])     Disease of thyroid gland     Gait disorder     Gluteal abscess 9/6/2021    Hyperlipidemia     Hypertension     Kidney failure     Kidney stones     Moderate protein-calorie malnutrition ([facility]) 12/22/2021    Osteoporosis     Pressure injury of left buttock, stage 4 ([facility]) 3/9/2022    Psychiatric disorder     Scoliosis     Seizures ([facility])     Sepsis ([facility]) 9/25/2019    Thyroid disease     UTI (urinary tract infection)        Past Surgical History:   Procedure Laterality Date    APPENDECTOMY      IR PICC PLACEMENT SINGLE LUMEN  9/13/2021    IR PICC PLACEMENT SINGLE LUMEN  9/16/2021 02/05/24 0910   OT Last Visit   OT Visit Date 02/05/24   Note Type   Note type Evaluation   Pain Assessment   Pain Assessment Tool FLACC   Pain Location/Orientation Orientation: Right;Location: Hip   Pain Rating: FLACC (Rest) - Face 0   Pain Rating: FLACC (Rest) - Legs 0   Pain Rating: FLACC (Rest) - Activity 0   Pain Rating: FLACC (Rest) - Cry 1   Pain Rating: FLACC (Rest) - Consolability 0   Score: FLACC (Rest) 1   Pain Rating: FLACC (Activity) - Face 1   Pain Rating: FLACC (Activity) - Legs 1   Pain Rating: FLACC (Activity) - Activity 2   Pain Rating: FLACC (Activity) - Cry 2   Pain Rating: FLACC (Activity) -  Consolability 1   Score: FLACC (Activity) 7   Restrictions/Precautions   Weight Bearing Precautions Per Order Yes   RLE Weight Bearing Per Order (S)  WBAT  (+ no active hip ABDuction)   Braces or Orthoses   (none)   Other Precautions Cognitive;Chair Alarm;Bed Alarm;Seizure;Fall Risk;Pain;WBS   Home Living   Type of Home Group Home   Home Layout Two level;Performs ADLs on one level;Able to live on main level with bedroom/bathroom;Access  (0 DHARMESH)   Bathroom Equipment Shower chair   Bathroom Accessibility Accessible   Home Equipment Wheelchair-manual   Additional Comments pt is poor historian and unable to provide PLOF/social history. Information obtained from previous admission in chart.   Prior Function   Level of [place] Needs assistance with ADLs;Needs assistance with IADLS   Lives With   (caregiver)   Receives Help From   (staff at group home)   IADLs Family/Friend/Other provides transportation;Family/Friend/Other provides meals;Family/Friend/Other provides medication management   Falls in the last 6 months   (pt has + fall history per chart review)   ADL   UB Bathing Assistance 3  Moderate Assistance   LB Bathing Assistance 2  Maximal Assistance   UB Dressing Assistance 3  Moderate Assistance   LB Dressing Assistance 1  Total Assistance   Bed Mobility   Supine to Sit 2  Maximal assistance   Additional items Assist x 1;HOB elevated;Increased time required;Verbal cues;LE management   Additional Comments Pt remained OOB in recliner upon conclusion   Transfers   Sit to Stand 3  Moderate assistance   Additional items Assist x 2;Increased time required;Impulsive;Verbal cues   Stand to Sit 3  Moderate assistance   Additional items Assist x 2;Increased time required   Stand pivot 3  Moderate assistance   Additional items Assist x 2;Increased time required;Impulsive;Verbal cues  (RW)   Balance   Static Sitting Fair   Dynamic Sitting Poor +   Static Standing Poor +   Dynamic Standing Poor   Ambulatory Poor   Activity  Tolerance   Activity Tolerance Patient limited by pain;Treatment limited secondary to medical complications (Comment)  (cognition)   Medical Staff Made Aware CM notified   Nurse Made Aware DOUGLAS Gill   RUE Assessment   RUE Assessment X  (AAROM WFL)   RUE Strength   RUE Overall Strength Unable to assess;Due to cognitive deficits   LUE Assessment   LUE Assessment X  (AAROM WFL)   LUE Strength   LUE Overall Strength Unable to assess;Due to cognitive deficits   Cognition   Overall Cognitive Status Impaired   Arousal/Participation Responsive   Attention Difficulty attending to directions   Orientation Level Oriented to person;Disoriented to place;Disoriented to time;Disoriented to situation  (pt responds to first name, but unable to state birthday & last name)   Memory Unable to assess   Following Commands Follows one step commands with increased time or repetition   Assessment   Limitation Decreased ADL status;Decreased UE strength;Decreased Safe judgement during ADL;Decreased endurance;Decreased self-care trans;Decreased high-level ADLs;Decreased cognition   Prognosis Fair   Assessment Pt is a 58 y.o. male seen for OT evaluation s/p admit to [facility] on 2/4/2024 w/ Nondisplaced fracture of greater trochanter of right femur, initial encounter for closed fracture (HCC).  Comorbidities affecting pt's functional performance at time of assessment include:  Cerebral palsy, DM II, Epilepsy, HLD, Osteoporosis, Scoliosis . Personal factors affecting pt at time of IE include:limited home support, difficulty performing ADLS, limited insight into deficits, and decreased initiation and engagement . Prior to admission, pt required A with ADLs. Upon evaluation: the following deficits impact occupational performance: decreased strength, decreased balance, decreased tolerance, impaired attention, impaired initiation, impaired memory, impaired sequencing, impaired problem solving, decreased safety awareness, increased pain, and  decreased coping skills. Pt to benefit from continued skilled OT tx while in the hospital to address deficits as defined above and maximize level of functional independence w ADL's and functional mobility. Occupational Performance areas to address include: eating, bathing/shower, toilet hygiene, dressing, functional mobility, and clothing management. From OT standpoint, recommendation at time of d/c would be Level II (Moderate Resource Intensity.   Goals   Patient Goals none expressed by pt d/t baseline cognitive status   Plan   Treatment Interventions ADL retraining;Functional transfer training;UE strengthening/ROM;Endurance training;Patient/family training;Equipment evaluation/education;Compensatory technique education;Energy conservation;Activityengagement;Cognitive reorientation   Goal Expiration Date 02/15/24   OT Treatment Day 0   OT Frequency 3-5x/wk   Discharge Recommendation   Rehab Resource Intensity Level, OT II (Moderate Resource Intensity)   Additional Comments  Pt seen as a co-eval with PT due to the patient's co-morbidities, clinically unstable presentation, and present impairments which are a regression from the patient's baseline.   Additional Comments 2 The patient's raw score on the AM-PAC Daily Activity Inpatient Short Form is 11. A raw score of less than 19 suggests the patient may benefit from discharge to post-acute rehabilitation services. Please refer to the recommendation of the Occupational Therapist for safe discharge planning.   AM-PAC Daily Activity Inpatient   Lower Body Dressing 1   Bathing 2   Toileting 1   Upper Body Dressing 2   Grooming 2   Eating 3   Daily Activity Raw Score 11   Daily Activity Standardized Score (Calc for Raw Score >=11) 29.04   AM-PAC Applied Cognition Inpatient   Following a Speech/Presentation 2   Understanding Ordinary Conversation 2   Taking Medications 1   Remembering Where Things Are Placed or Put Away 1   Remembering List of 4-5 Errands 1   Taking Care  of Complicated Tasks 1   Applied Cognition Raw Score 8   Applied Cognition Standardized Score 19.32     GOALS:    Pt will achieve the following within specified time frame: STG  Pt will achieve the following goals within 5 days    *ADL transfers with Mod (A) for inc'd independence with ADLs/purposeful tasks    *Self Feeding- Min (A) for inc'd independence with providing self nourishment    *UB ADL with Min (A) for inc'd independence with self cares    *LB ADL with Max (A) using AE prn for inc'd independence with self cares    *Toileting with Max (A) for clothing management and hygiene for return to PLOF with personal care    *Increase static stand balance to F- and dyn stand balance to P+ for inc'd safety with standing purposeful tasks    *Increase stand tolerance x3 m for inc'd tolerance with standing purposeful tasks    *Participate in 10m UE therex to increase overall stamina/activity tolerance for purposeful tasks    *Bed mobility- Mod (A) for inc'd independence to manage own comfort and initiate EOB & OOB purposeful tasks    Pt will achieve the following within specified time frame: LTG  Pt will achieve the following goals within 10 days    *ADL transfers with Min (A) for inc'd independence with ADLs/purposeful tasks    *Self Feeding- (S) for inc'd independence with providing self nourishment    *UB ADL with CGA for inc'd independence with self cares    *LB ADL with Mod (A) using AE prn for inc'd independence with self cares    *Toileting with Mod (A) for clothing management and hygiene for return to PLOF with personal care    *Increase static stand balance to F and dyn stand balance to F- for inc'd safety with standing purposeful tasks    *Increase stand tolerance x5 m for inc'd tolerance with standing purposeful tasks    *Bed mobility- Min (A) for inc'd independence to manage own comfort and initiate EOB & OOB purposeful tasks      Abdulkadir Velasquez, MS, OTR/L

## 2024-02-05 NOTE — PLAN OF CARE
Problem: Potential for Falls  Goal: Patient will remain free of falls  Description: INTERVENTIONS:  - Educate patient/family on patient safety including physical limitations  - Instruct patient to call for assistance with activity   - Consult OT/PT to assist with strengthening/mobility   - Keep Call bell within reach  - Keep bed low and locked with side rails adjusted as appropriate  - Keep care items and personal belongings within reach  - Initiate and maintain comfort rounds  - Make Fall Risk Sign visible to staff  - Offer Toileting every 4 Hours, in advance of need  - Initiate/Maintain bed alarm  - Obtain necessary fall risk management equipment: yellow socks and bracelet  - Apply yellow socks and bracelet for high fall risk patients  - Consider moving patient to room near nurses station  Outcome: Progressing

## 2024-02-05 NOTE — PLAN OF CARE
Problem: PHYSICAL THERAPY ADULT  Goal: Performs mobility at highest level of function for planned discharge setting.  See evaluation for individualized goals.  Description: Treatment/Interventions: Functional transfer training, LE strengthening/ROM, Therapeutic exercise, Endurance training, Cognitive reorientation, Patient/family training, Equipment eval/education, Bed mobility, Gait training, Spoke to nursing, Spoke to case management, OT  Equipment Recommended:  (continue TBD pending progress, RW at this time)       See flowsheet documentation for full assessment, interventions and recommendations.  Note: Prognosis: Fair  Problem List: Decreased strength, Decreased endurance, Impaired balance, Decreased mobility, Decreased cognition, Impaired judgement, Decreased safety awareness, Orthopedic restrictions, Pain  Assessment: Pt is 58 y.o. male seen for high-complexity PT evaluation on 2/5/2024 s/p admit to [facility] on 2/4/2024 w/ Nondisplaced fracture of greater trochanter of right femur, initial encounter for closed fracture (HCC) s/p fall; per ortho pt is to be WBAT RLE + no active hip ABDuction. PT initially consulted to assess pt's functional mobility and d/c needs. Order placed for PT eval and tx, w/ up and OOB as tolerated order. PTA, pt resides with caregiver in a group home in 2  c 1st floor setup. At time of eval, pt requires max Ax1 for supine>sit, mod Ax2 for STS and short amb x 3 ft with RW. Upon evaluation, pt presenting with impaired functional mobility d/t decreased strength, decreased endurance, impaired balance, decreased mobility, decreased cognition, impaired judgement, decreased safety awareness, orthopedic restrictions, and pain. Pertinent PMHx and current co-morbidities affecting pt's physical performance at time of assessment include: h/o AAA, hypothyroidism, HTN, generalized convulsive epilepsy, DM type 2, cerebral palsy. Personal factors affecting pt at time of eval  include: decreased cognition, positive fall history, unable to perform physical activity, and limited insight into impairments. Objective measures performed on IE also reveal limitations: AM-PAC 6-Clicks: 11/24. Pt's clinical presentation is currently unstable/unpredictable seen in pt's presentation of  severe R hip pain impacting overall mobility status, ongoing medical assessment, and decreased cognition . Overall, pt's rehab potential and prognosis to return to PLOF is fair as impacted by objective findings, warranting pt to receive further skilled PT interventions to address identified impairments, activity limitation(s), and participation restriction(s). Pt is to benefit from continued PT tx to address deficits as defined above and maximize level of functional independent mobility and consistency in order for pt to improve safety with mobility tasks. From PT/mobility standpoint, recommendation at time of d/c would be level 2, moderate resource intensity PT services, pending pt's overall functional progress in order to facilitate return to PLOF and abilities.  Barriers to Discharge: Inaccessible home environment     Rehab Resource Intensity Level, PT: II (Moderate Resource Intensity)    See flowsheet documentation for full assessment.

## 2024-02-05 NOTE — CASE MANAGEMENT
Case Management Assessment & Discharge Planning Note    Patient name Jairon Oconnell  Location /-01 MRN [id number]  : 1965 Date 2024       Current Admission Date: 2024  Current Admission Diagnosis:Nondisplaced fracture of greater trochanter of right femur, initial encounter for closed fracture ([facility])   Patient Active Problem List    Diagnosis Date Noted    Nondisplaced fracture of greater trochanter of right femur, initial encounter for closed fracture ([facility]) 2024    Closed nondisplaced fracture of proximal phalanx of left index finger with routine healing, subsequent encounter 10/30/2023    Pressure ulcer of sacral region, stage 3 ([facility]) 2023    Ambulatory dysfunction 2022    Social problem 2021    Hypomagnesemia 2021    Thrombocytopathia ([facility]) 2020    Hyponatremia 2019    Metabolic encephalopathy 2019    Seborrheic dermatitis of scalp 2019    Nearsightedness 2019    Behavior concern in adult 2019    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    Vitamin B12 deficiency 2017    Hyperlipidemia 2016    Vitamin D deficiency 2016    Type 2 diabetes mellitus with diabetic neuropathy, without long-term current use of insulin ([facility]) 06/15/2016    Acquired hypothyroidism 06/15/2016    Cataract 2013    Cerebral palsy ([facility]) 2013    Generalized convulsive epilepsy ([facility]) 2013    Essential hypertension 2013    Osteoporosis 2013    Scoliosis 2013      LOS (days): 1  Geometric Mean LOS (GMLOS) (days): 2.8  Days to GMLOS:1.8     OBJECTIVE:    Risk of Unplanned Readmission Score: 14.81         Current admission status: Inpatient       Preferred Pharmacy:   [facility] - [address]  [address] [id number]  Phone: [phone] Fax: [phone]    Primary Care Provider: HUNTER Brown    Primary Insurance:  MEDICARE  Secondary Insurance: [place] [employer]    ASSESSMENT:  Active Health Care Proxies    There are no active Health Care Proxies on file.                 Readmission Root Cause  30 Day Readmission: No    Patient Information  Admitted from:: Home (Pt lives in Care givers ( [facility]))  Mental Status: Confused (Pt has a history of Cerebral palsy.)  During Assessment patient was accompanied by: Sister (Alena Berkowitz)  Assessment information provided by:: Sister  Primary Caregiver: Other (Comment) (Pt lives at Care giver [facility] [place].)  Caregiver's Name:: Jaylyn Yen  Caregiver's Relationship to Patient:: Other (Specify)  Caregiver's Telephone Number:: [phone]  Support Systems: Family members, Private Caregivers (Sister Kemi)  County of Residence: [place]  What city do you live in?: [place]  Home entry access options. Select all that apply.: No steps to enter home  Type of Current Residence: 2 story home  Upon entering residence, is there a bedroom on the main floor (no further steps)?: Yes  Upon entering residence, is there a bathroom on the main floor (no further steps)?: Yes  Living Arrangements: Other (Comment) (Pt lives with his care giver.)  Is patient a ?: No    Activities of Daily Living Prior to Admission  Functional Status: Assistance  Completes ADLs independently?: No  Level of ADL dependence: Assistance  Ambulates independently?: Yes  Does patient use assisted devices?: No  Does patient currently own DME?: Yes  What DME does the patient currently own?: Wheelchair (Pt uses wheel chair when he is outside and is going to ambulate any long distance.)  Does patient have a history of Outpatient Therapy (PT/OT)?: Yes  Does the patient have a history of Short-Term Rehab?: No  Does patient have a history of HHC?: Yes (They are not sure what agency it is)  Does patient currently have HHC?: No         Patient Information Continued  Income Source: SSI/SSD  Does patient have  prescription coverage?: Yes  Does patient receive dialysis treatments?: No  Does patient have a history of substance abuse?: No  Does patient have a history of Mental Health Diagnosis?: No         Means of Transportation  Means of Transport to Appts:: Other (Comment) (Caregiver drives patient to appts.)  [place] Application:  (Pt is not appropriate for this.)      Housing Stability: Low Risk  (2/5/2024)    Housing Stability Vital Sign     Unable to Pay for Housing in the Last Year: No     Number of Places Lived in the Last Year: 1     Unstable Housing in the Last Year: No   Food Insecurity: No Food Insecurity (2/5/2024)    Hunger Vital Sign     Worried About Running Out of Food in the Last Year: Never true     Ran Out of Food in the Last Year: Never true   Transportation Needs: No Transportation Needs (2/5/2024)    PRAPARE - Transportation     Lack of Transportation (Medical): No     Lack of Transportation (Non-Medical): No   Utilities: Not At Risk (2/5/2024)    [facility] Utilities     Threatened with loss of utilities: No       DISCHARGE DETAILS:  Freedom of Choice: Yes   Sister Alena and Madeline   from [facility] contacted family/caregiver?: Yes  Were Treatment Team discharge recommendations reviewed with patient/caregiver?: Yes  Did patient/caregiver verbalize understanding of patient care needs?: Yes  Were patient/caregiver advised of the risks associated with not following Treatment Team discharge recommendations?: Yes         Requested Home Health Care         Is the patient interested in HHC at discharge?: No    DME Referral Provided  Referral made for DME?: No             Pt lives with a care giver in her house. Pt needs assist with ADL's.  Prior to fall Pt ambulated with out device, with an unsteady gait. Pt  has a history of Cerebral Palsy and seizures.  PT and OT are recommending [facility].  ([phone]) reluctantly are agreeable to patient to go to STR. Pt will need to be optioned to  go to STR. I will start process , filling out passar and MA51. I will send blanket  referrals to [facility] . I will continue to follow for any Case Management needs.

## 2024-02-05 NOTE — CONSULTS
Consultation - Orthopedics   Jairon Oconnell 58 y.o. male MRN: [id number]  Unit/Bed#: -01 Encounter: [id number]      Assessment/Plan     Assessment:  Nondisplaced fracture right greater trochanter with some element of chronicity    Plan:  Out of bed  Weightbearing as tolerated right lower extremity  No active hip abduction  Does not require surgical invention  May need placement due to mentation    History of Present Illness   Physician Requesting Consult: Carrie Kennedy DO  Reason for Consult / Principal Problem: Fracture right greater trochanteric  HPI: Jairon Oconnell is a 58 y.o. year old male who is mentally challenged and lives in a facility complaining of pain along the lateral aspect of his right hip.  There is a questionable history of a fall.  He did come to the emergency room where x-rays were taken and he was diagnosed with a fracture of his right greater trochanter after a CT and x-rays were performed.  There is no documentation of a specific head injury.    Inpatient consult to Orthopedic Surgery  Consult performed by: Jairon Villarreal DO  Consult ordered by: HUNTER Main          Review of Systems   Constitutional:  Negative for chills, fever and unexpected weight change.   HENT:  Negative for hearing loss, nosebleeds and sore throat.    Eyes:  Negative for pain, redness and visual disturbance.   Respiratory:  Negative for cough, shortness of breath and wheezing.    Cardiovascular:  Negative for chest pain, palpitations and leg swelling.   Gastrointestinal:  Negative for abdominal pain, nausea and vomiting.   Endocrine: Negative for polydipsia and polyuria.   Genitourinary:  Negative for dysuria and hematuria.   Musculoskeletal:  Positive for arthralgias, gait problem and myalgias. Negative for back pain, joint swelling, neck pain and neck stiffness.        As noted in HPI   Skin:  Negative for rash and wound.   Neurological:  Negative for dizziness, numbness and headaches.    Psychiatric/Behavioral:  Negative for decreased concentration and suicidal ideas. The patient is not nervous/anxious.        Historical Information   Past Medical History:   Diagnosis Date    ADRIANA (acute kidney injury) (formerly [facility]) 9/24/2019    Bacteremia due to Gram-positive bacteria 9/7/2021    Buttock wound, left, subsequent encounter 11/5/2021    COVID-19     CP (cerebral palsy) (formerly [facility])     Depression     Diabetes (formerly [facility])     Disease of thyroid gland     Gait disorder     Gluteal abscess 9/6/2021    Hyperlipidemia     Hypertension     Kidney failure     Kidney stones     Moderate protein-calorie malnutrition (formerly [facility]) 12/22/2021    Osteoporosis     Pressure injury of left buttock, stage 4 (formerly [facility]) 3/9/2022    Psychiatric disorder     Scoliosis     Seizures (formerly [facility])     Sepsis (formerly [facility]) 9/25/2019    Thyroid disease     UTI (urinary tract infection)      Past Surgical History:   Procedure Laterality Date    APPENDECTOMY      IR PICC PLACEMENT SINGLE LUMEN  9/13/2021    IR PICC PLACEMENT SINGLE LUMEN  9/16/2021     Social History   Social History     Substance and Sexual Activity   Alcohol Use Never     Social History     Substance and Sexual Activity   Drug Use No     E-Cigarette/Vaping    E-Cigarette Use Never User      E-Cigarette/Vaping Substances    Nicotine No     THC No     CBD No     Flavoring No     Other No     Unknown No      Social History     Tobacco Use   Smoking Status Never   Smokeless Tobacco Never     Family History: No family history on file.    Meds/Allergies   current meds:   Current Facility-Administered Medications   Medication Dose Route Frequency    acetaminophen (TYLENOL) tablet 650 mg  650 mg Oral Q6H PRN    benztropine (COGENTIN) tablet 0.5 mg  0.5 mg Oral BID    calcium carbonate (OYSTER SHELL,OSCAL) 500 mg tablet 1 tablet  1 tablet Oral TID    cholecalciferol (VITAMIN D3) tablet 1,000 Units  1,000 Units Oral Daily    cyanocobalamin (VITAMIN B-12) tablet 1,000 mcg  1,000 mcg Oral Every Other Day    Diclofenac  Sodium (VOLTAREN) 1 % topical gel 2 g  2 g Topical 4x Daily    divalproex sodium (DEPAKOTE) DR tablet 1,000 mg  1,000 mg Oral HS    divalproex sodium (DEPAKOTE) DR tablet 750 mg  750 mg Oral Daily    docusate sodium (COLACE) capsule 100 mg  100 mg Oral BID    gabapentin (NEURONTIN) capsule 300 mg  300 mg Oral TID    heparin (porcine) subcutaneous injection 5,000 Units  5,000 Units Subcutaneous Q8H RONALD    hydrOXYzine HCL (ATARAX) tablet 25 mg  25 mg Oral BID    insulin lispro (HumaLOG) 100 units/mL subcutaneous injection 1-5 Units  1-5 Units Subcutaneous TID AC    insulin lispro (HumaLOG) 100 units/mL subcutaneous injection 1-5 Units  1-5 Units Subcutaneous HS    lacosamide (VIMPAT) tablet 150 mg  150 mg Oral Daily    lacosamide (VIMPAT) tablet 200 mg  200 mg Oral HS    levETIRAcetam (KEPPRA) tablet 1,500 mg  1,500 mg Oral Q12H RONALD    levothyroxine tablet 150 mcg  150 mcg Oral Early Morning    lisinopril (ZESTRIL) tablet 10 mg  10 mg Oral Daily    loratadine (CLARITIN) tablet 10 mg  10 mg Oral Daily    multivitamin stress formula tablet 1 tablet  1 tablet Oral Daily    pravastatin (PRAVACHOL) tablet 80 mg  80 mg Oral Daily With Dinner    QUEtiapine (SEROquel XR) 24 hr tablet 200 mg  200 mg Oral BID    QUEtiapine (SEROquel XR) 24 hr tablet 400 mg  400 mg Oral HS    temazepam (RESTORIL) capsule 15 mg  15 mg Oral HS PRN    zonisamide (ZONEGRAN) capsule 300 mg  300 mg Oral HS     Allergies   Allergen Reactions    Depakote [Valproic Acid] Other (See Comments)     Must have brand name Depakote    Erythromycin Other (See Comments)     Unknown per pt    Penicillins Other (See Comments)     Unknown per pt       Objective   Vitals: Blood pressure 112/63, pulse 88, temperature 98.1 °F (36.7 °C), resp. rate 20, weight 70.7 kg (155 lb 13.8 oz), SpO2 94%.,Body mass index is 21.14 kg/m².      Intake/Output Summary (Last 24 hours) at 2/5/2024 0713  Last data filed at 2/5/2024 0549  Gross per 24 hour   Intake 240 ml   Output 600 ml  "  Net -360 ml     I/O last 24 hours:  In: 240 [P.O.:240]  Out: 600 [Urine:600]    Invasive Devices       Peripheral Intravenous Line  Duration             Peripheral IV 02/05/24 Left;Ventral (anterior) Forearm <1 day                    Physical Exam  Ortho Exam    Physical Exam   Constitutional: Appears well-developed and well-nourished. No distress.   HENT:   Head: Normocephalic.   Eyes: Conjunctivae are normal. Right eye exhibits no discharge. Left eye exhibits no discharge. No scleral icterus.   Cardiovascular: Normal rate.    Pulmonary/Chest: Effort normal.   Neurological: NOT Alert and oriented to person, place, and time due to baseline mentation.   Skin: Skin is warm and dry. No rash noted. The patient is not diaphoretic. No erythema. No pallor.   Psychiatric: Normal mood and affect. Behavior is normal. Judgment and thought content normal.     Right lower extremity is neurovascularly intact.  Toes are pink and mobile.  Compartments are soft.  No shortening.  No ecchymosis or swelling.  He does grimace to palpation along the greater trochanter.  With rotation however he does not.  Pulses intact.  Negative Homans      Lab Results: CBC:   Lab Results   Component Value Date    WBC 7.71 02/05/2024    HGB 12.9 02/05/2024    HCT 38.2 02/05/2024    MCV 96 02/05/2024     02/05/2024    RBC 3.99 02/05/2024    MCH 32.3 02/05/2024    MCHC 33.8 02/05/2024    RDW 12.8 02/05/2024    MPV 11.5 02/05/2024    NRBC 0 02/05/2024     CMP:   Lab Results   Component Value Date    SODIUM 136 02/05/2024    CL 99 02/05/2024    CO2 29 02/05/2024    BUN 19 02/05/2024    CREATININE 0.81 02/05/2024    CALCIUM 9.7 02/05/2024    EGFR 99 02/05/2024     PT/INR: No results found for: \"PT\", \"INR\"  Imaging Studies: I have personally reviewed pertinent reports.      X-rays show a semiacute fracture of the right greater trochanter which is nondisplaced    CT shows more elements of an acute fracture    EKG, Pathology, and Other Studies: I have " personally reviewed pertinent reports.    VTE Prophylaxis: Sequential compression device (Venodyne)     Code Status: Level 1 - Full Code  Advance Directive and Living Will:      Power of :    POLST:      Counseling / Coordination of Care  Total floor / unit time spent today 45 minutes. Greater than 50% of total time was spent with the patient and / or family counseling and / or coordination of care. A description of the counseling / coordination of care:

## 2024-02-05 NOTE — ASSESSMENT & PLAN NOTE
Lab Results   Component Value Date    HGBA1C 5.5 11/27/2023       Recent Labs     02/04/24  1611 02/04/24  2045 02/05/24  0708 02/05/24  1106   POCGLU 85 104 80 110       Blood Sugar Average: Last 72 hrs:  (P) 92  Oral hypoglycemic medications remain on hold  Target blood sugar for the hospital is 140-180  Continue Accu-Cheks before meals and at bedtime with sliding scale insulin coverage  Diabetic neuropathy-continue gabapentin

## 2024-02-06 ENCOUNTER — APPOINTMENT (OUTPATIENT)
Dept: OCCUPATIONAL THERAPY | Facility: CLINIC | Age: 59
End: 2024-02-06
Payer: MEDICARE

## 2024-02-06 LAB
GLUCOSE SERPL-MCNC: 143 MG/DL (ref 65–140)
GLUCOSE SERPL-MCNC: 186 MG/DL (ref 65–140)
GLUCOSE SERPL-MCNC: 91 MG/DL (ref 65–140)
GLUCOSE SERPL-MCNC: 95 MG/DL (ref 65–140)

## 2024-02-06 PROCEDURE — 82948 REAGENT STRIP/BLOOD GLUCOSE: CPT

## 2024-02-06 PROCEDURE — 99232 SBSQ HOSP IP/OBS MODERATE 35: CPT | Performed by: HOSPITALIST

## 2024-02-06 RX ADMIN — DICLOFENAC SODIUM 2 G: 10 GEL TOPICAL at 08:56

## 2024-02-06 RX ADMIN — PRAVASTATIN SODIUM 80 MG: 40 TABLET ORAL at 16:00

## 2024-02-06 RX ADMIN — DIVALPROEX SODIUM 750 MG: 250 TABLET, DELAYED RELEASE ORAL at 08:42

## 2024-02-06 RX ADMIN — HEPARIN SODIUM 5000 UNITS: 5000 INJECTION INTRAVENOUS; SUBCUTANEOUS at 05:41

## 2024-02-06 RX ADMIN — TEMAZEPAM 15 MG: 7.5 CAPSULE ORAL at 22:02

## 2024-02-06 RX ADMIN — B-COMPLEX W/ C & FOLIC ACID TAB 1 TABLET: TAB at 08:43

## 2024-02-06 RX ADMIN — QUETIAPINE 400 MG: 400 TABLET, FILM COATED, EXTENDED RELEASE ORAL at 22:03

## 2024-02-06 RX ADMIN — HEPARIN SODIUM 5000 UNITS: 5000 INJECTION INTRAVENOUS; SUBCUTANEOUS at 22:02

## 2024-02-06 RX ADMIN — BENZTROPINE MESYLATE 0.5 MG: 0.5 TABLET ORAL at 17:31

## 2024-02-06 RX ADMIN — GABAPENTIN 300 MG: 300 CAPSULE ORAL at 16:00

## 2024-02-06 RX ADMIN — GABAPENTIN 300 MG: 300 CAPSULE ORAL at 20:21

## 2024-02-06 RX ADMIN — HEPARIN SODIUM 5000 UNITS: 5000 INJECTION INTRAVENOUS; SUBCUTANEOUS at 13:19

## 2024-02-06 RX ADMIN — DIVALPROEX SODIUM 1000 MG: 500 TABLET, DELAYED RELEASE ORAL at 22:02

## 2024-02-06 RX ADMIN — DICLOFENAC SODIUM 2 G: 10 GEL TOPICAL at 22:02

## 2024-02-06 RX ADMIN — LEVOTHYROXINE SODIUM 150 MCG: 75 TABLET ORAL at 05:41

## 2024-02-06 RX ADMIN — DOCUSATE SODIUM 100 MG: 100 CAPSULE, LIQUID FILLED ORAL at 08:40

## 2024-02-06 RX ADMIN — CALCIUM 1 TABLET: 500 TABLET ORAL at 08:43

## 2024-02-06 RX ADMIN — LACOSAMIDE 150 MG: 150 TABLET ORAL at 08:40

## 2024-02-06 RX ADMIN — CALCIUM 1 TABLET: 500 TABLET ORAL at 20:21

## 2024-02-06 RX ADMIN — LEVETIRACETAM 1500 MG: 500 TABLET, FILM COATED ORAL at 08:42

## 2024-02-06 RX ADMIN — HYDROXYZINE HYDROCHLORIDE 25 MG: 50 TABLET, FILM COATED ORAL at 08:43

## 2024-02-06 RX ADMIN — INSULIN LISPRO 1 UNITS: 100 INJECTION, SOLUTION INTRAVENOUS; SUBCUTANEOUS at 22:03

## 2024-02-06 RX ADMIN — LACOSAMIDE 200 MG: 50 TABLET ORAL at 22:02

## 2024-02-06 RX ADMIN — LISINOPRIL 10 MG: 10 TABLET ORAL at 08:43

## 2024-02-06 RX ADMIN — QUETIAPINE 200 MG: 400 TABLET, FILM COATED, EXTENDED RELEASE ORAL at 14:13

## 2024-02-06 RX ADMIN — QUETIAPINE 200 MG: 400 TABLET, FILM COATED, EXTENDED RELEASE ORAL at 09:05

## 2024-02-06 RX ADMIN — GABAPENTIN 300 MG: 300 CAPSULE ORAL at 08:40

## 2024-02-06 RX ADMIN — ACETAMINOPHEN 650 MG: 325 TABLET ORAL at 13:19

## 2024-02-06 RX ADMIN — BENZTROPINE MESYLATE 0.5 MG: 0.5 TABLET ORAL at 08:43

## 2024-02-06 RX ADMIN — DOCUSATE SODIUM 100 MG: 100 CAPSULE, LIQUID FILLED ORAL at 17:31

## 2024-02-06 RX ADMIN — LEVETIRACETAM 1500 MG: 500 TABLET, FILM COATED ORAL at 20:21

## 2024-02-06 RX ADMIN — CALCIUM 1 TABLET: 500 TABLET ORAL at 16:00

## 2024-02-06 RX ADMIN — DICLOFENAC SODIUM 2 G: 10 GEL TOPICAL at 13:15

## 2024-02-06 RX ADMIN — HYDROXYZINE HYDROCHLORIDE 25 MG: 50 TABLET, FILM COATED ORAL at 13:21

## 2024-02-06 RX ADMIN — CHOLECALCIFEROL TAB 25 MCG (1000 UNIT) 1000 UNITS: 25 TAB at 08:40

## 2024-02-06 RX ADMIN — DICLOFENAC SODIUM 2 G: 10 GEL TOPICAL at 17:31

## 2024-02-06 RX ADMIN — LORATADINE 10 MG: 10 TABLET ORAL at 08:43

## 2024-02-06 RX ADMIN — ZONISAMIDE 300 MG: 100 CAPSULE ORAL at 22:02

## 2024-02-06 NOTE — CASE MANAGEMENT
Case Management Discharge Planning Note    Patient name Jairon Oconnell  Location /-01 MRN [id number]  : 1965 Date 2024       Current Admission Date: 2024  Current Admission Diagnosis:Nondisplaced fracture of greater trochanter of right femur, initial encounter for closed fracture ([facility])   Patient Active Problem List    Diagnosis Date Noted    Nondisplaced fracture of greater trochanter of right femur, initial encounter for closed fracture ([facility]) 2024    Closed nondisplaced fracture of proximal phalanx of left index finger with routine healing, subsequent encounter 10/30/2023    Pressure ulcer of sacral region, stage 3 ([facility]) 2023    Ambulatory dysfunction 2022    Social problem 2021    Hypomagnesemia 2021    Thrombocytopathia (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) 06/15/2016    Acquired hypothyroidism 06/15/2016    Cataract 2013    Cerebral palsy ([facility]) 2013    Generalized convulsive epilepsy ([facility]) 2013    Essential hypertension 2013    Osteoporosis 2013    Scoliosis 2013      LOS (days): 2  Geometric Mean LOS (GMLOS) (days): 2.8  Days to GMLOS:0.7     OBJECTIVE:  Risk of Unplanned Readmission Score: 15.03         Current admission status: Inpatient   Preferred Pharmacy:   [facility] - [address]  [address] [address]  Phone: [phone] Fax: [phone]    Primary Care Provider: HUNTER Brown    Primary Insurance: MEDICARE  Secondary Insurance: [place]  MEDICAL ASSISTANCE    DISCHARGE DETAILS:          I completed MA 51 and passar . As per Madeline  at [facility] Kemi would be the one to sign paperwork for him.  I notified Kemi I needed some paperwork signed . She will be in the hospital tomorrow to sign MA 51 and passar.

## 2024-02-06 NOTE — ASSESSMENT & PLAN NOTE
Status post a mechanical fall prior to arrival   CT right lower extremity imaging confirmed a comminuted and nondisplaced fractures of the right greater trochanter with no evidence of femoral neck fracture.   Status post an orthopedic surgical evaluation  No indication at this time for an acute phase inpatient surgical intervention  Okay for weightbearing as tolerated  Continue current pain medication management regimen  Status post a PT and OT evaluation, they recommend postacute rehabilitation services  Case management is working on placement options  Case reviewed with case management, they informed me that the patient will be here another few days since he needs to be optioned  Patient remains medically stable for discharge  We will discharge as soon as case management has set up a destination for rehab

## 2024-02-06 NOTE — PLAN OF CARE
Problem: Potential for Falls  Goal: Patient will remain free of falls  Description: INTERVENTIONS:  - Educate patient/family on patient safety including physical limitations  - Instruct patient to call for assistance with activity   - Consult OT/PT to assist with strengthening/mobility   - Keep Call bell within reach  - Keep bed low and locked with side rails adjusted as appropriate  - Keep care items and personal belongings within reach  - Initiate and maintain comfort rounds  - Make Fall Risk Sign visible to staff  - Offer Toileting every 2 Hours, in advance of need  - Initiate/Maintain bed alarm  - Apply yellow socks and bracelet for high fall risk patients  - Consider moving patient to room near nurses station  Outcome: Progressing     Problem: Prexisting or High Potential for Compromised Skin Integrity  Goal: Skin integrity is maintained or improved  Description: INTERVENTIONS:  - Identify patients at risk for skin breakdown  - Assess and monitor skin integrity  - Assess and monitor nutrition and hydration status  - Monitor labs   - Assess for incontinence   - Turn and reposition patient  - Assist with mobility/ambulation  - Relieve pressure over bony prominences  - Avoid friction and shearing  - Provide appropriate hygiene as needed including keeping skin clean and dry  - Evaluate need for skin moisturizer/barrier cream  - Collaborate with interdisciplinary team   - Patient/family teaching  - Consider wound care consult   Outcome: Progressing     Problem: PAIN - ADULT  Goal: Verbalizes/displays adequate comfort level or baseline comfort level  Description: Interventions:  - Encourage patient to monitor pain and request assistance  - Assess pain using appropriate pain scale  - Administer analgesics based on type and severity of pain and evaluate response  - Implement non-pharmacological measures as appropriate and evaluate response  - Consider cultural and social influences on pain and pain management  -  Notify physician/advanced practitioner if interventions unsuccessful or patient reports new pain  Outcome: Progressing     Problem: INFECTION - ADULT  Goal: Absence or prevention of progression during hospitalization  Description: INTERVENTIONS:  - Assess and monitor for signs and symptoms of infection  - Monitor lab/diagnostic results  - Monitor all insertion sites, i.e. indwelling lines, tubes, and drains  - Monitor endotracheal if appropriate and nasal secretions for changes in amount and color  - Potter appropriate cooling/warming therapies per order  - Administer medications as ordered  - Instruct and encourage patient and family to use good hand hygiene technique  - Identify and instruct in appropriate isolation precautions for identified infection/condition  Outcome: Progressing  Goal: Absence of fever/infection during neutropenic period  Description: INTERVENTIONS:  - Monitor WBC    Outcome: Progressing     Problem: SAFETY ADULT  Goal: Maintain or return to baseline ADL function  Description: INTERVENTIONS:  -  Assess patient's ability to carry out ADLs; assess patient's baseline for ADL function and identify physical deficits which impact ability to perform ADLs (bathing, care of mouth/teeth, toileting, grooming, dressing, etc.)  - Assess/evaluate cause of self-care deficits   - Assess range of motion  - Assess patient's mobility; develop plan if impaired  - Assess patient's need for assistive devices and provide as appropriate  - Encourage maximum independence but intervene and supervise when necessary  - Involve family in performance of ADLs  - Assess for home care needs following discharge   - Consider OT consult to assist with ADL evaluation and planning for discharge  - Provide patient education as appropriate  Outcome: Progressing  Goal: Maintains/Returns to pre admission functional level  Description: INTERVENTIONS:  - Perform AM-PAC 6 Click Basic Mobility/ Daily Activity assessment daily.  - Set  and communicate daily mobility goal to care team and patient/family/caregiver.   - Collaborate with rehabilitation services on mobility goals if consulted  - Perform Range of Motion 3 times a day.  - Reposition patient every 2 hours.  - Dangle patient 3 times a day  - Stand patient 3 times a day  - Ambulate patient 3 times a day  - Out of bed to chair 3 times a day   - Out of bed for meals 3 times a day  - Out of bed for toileting  - Record patient progress and toleration of activity level   Outcome: Progressing     Problem: DISCHARGE PLANNING  Goal: Discharge to home or other facility with appropriate resources  Description: INTERVENTIONS:  - Identify barriers to discharge w/patient and caregiver  - Arrange for needed discharge resources and transportation as appropriate  - Identify discharge learning needs (meds, wound care, etc.)  - Arrange for interpretive services to assist at discharge as needed  - Refer to Case Management Department for coordinating discharge planning if the patient needs post-hospital services based on physician/advanced practitioner order or complex needs related to functional status, cognitive ability, or social support system  Outcome: Progressing     Problem: Knowledge Deficit  Goal: Patient/family/caregiver demonstrates understanding of disease process, treatment plan, medications, and discharge instructions  Description: Complete learning assessment and assess knowledge base.  Interventions:  - Provide teaching at level of understanding  - Provide teaching via preferred learning methods  Outcome: Progressing

## 2024-02-06 NOTE — ASSESSMENT & PLAN NOTE
Lab Results   Component Value Date    HGBA1C 5.5 11/27/2023       Recent Labs     02/05/24  1106 02/05/24  1550 02/05/24  2112 02/06/24  0648   POCGLU 110 94 130 91         Blood Sugar Average: Last 72 hrs:  (P) 96.875  Oral hypoglycemic medications remain on hold  Target blood sugar for the hospital is 140-180  Continue Accu-Cheks before meals and at bedtime with sliding scale insulin coverage  Diabetic neuropathy-continue gabapentin

## 2024-02-06 NOTE — PROGRESS NOTES
[facility]  [facility] Note  Name: Jairon Oconnell I  MRN: [id number]  Unit/Bed#: -01 I Date of Admission: 2/4/2024   Date of Service: 2/6/2024 I Hospital Day: 2    Assessment/Plan   * Nondisplaced fracture of greater trochanter of right femur, initial encounter for closed fracture (HCC)  Assessment & Plan  Status post a mechanical fall prior to arrival   CT right lower extremity imaging confirmed a comminuted and nondisplaced fractures of the right greater trochanter with no evidence of femoral neck fracture.   Status post an orthopedic surgical evaluation  No indication at this time for an acute phase inpatient surgical intervention  Okay for weightbearing as tolerated  Continue current pain medication management regimen  Status post a PT and OT evaluation, they recommend postacute rehabilitation services  Case management is working on placement options  Case reviewed with case management, they informed me that the patient will be here another few days since he needs to be optioned  Patient remains medically stable for discharge  We will discharge as soon as case management has set up a destination for rehab    Cerebral palsy (HCC)  Assessment & Plan  He lives in a group home and has caregiver  Continue supportive care    Essential hypertension  Assessment & Plan  Blood pressure stable  Continue lisinopril    Generalized convulsive epilepsy (HCC)  Assessment & Plan  Currently controlled  Continue Depakote 750 mg in the morning and at 1000 mg at bedtime, Vimpat 150 mg in the morning and 200 mg at bedtime, Keppra 750 mg BID, and zonisamide 300 mg at bedtime      Acquired hypothyroidism  Assessment & Plan  Continue levothyroxine 150 mcg daily    Type 2 diabetes mellitus with diabetic neuropathy, without long-term current use of insulin (HCC)  Assessment & Plan  Lab Results   Component Value Date    HGBA1C 5.5 11/27/2023       Recent Labs     02/05/24  1106 02/05/24  1550 02/05/24 2112  02/06/24  0648   POCGLU 110 94 130 91         Blood Sugar Average: Last 72 hrs:  (P) 96.875  Oral hypoglycemic medications remain on hold  Target blood sugar for the hospital is 140-180  Continue Accu-Cheks before meals and at bedtime with sliding scale insulin coverage  Diabetic neuropathy-continue gabapentin    Hyperlipidemia  Assessment & Plan  Continue pravastatin    Behavior concern in adult  Assessment & Plan  With impulsive behavior at times  Continue Seroquel 200 mg at 8 AM and 2 PM and 400 mg at bedtime  Continue hydroxyzine 25 mg twice daily  Continue benztropine 0.5 mg twice daily  Supportive care               VTE Pharmacologic Prophylaxis: VTE Score: 6 High Risk (Score >/= 5) - Pharmacological DVT Prophylaxis Ordered: heparin. Sequential Compression Devices Ordered.    Mobility:   Basic Mobility Inpatient Raw Score: 11  JH-HLM Goal: 4: Move to chair/commode  JH-HLM Achieved: 4: Move to chair/commode  HLM Goal NOT achieved. Continue with multidisciplinary rounding and encourage appropriate mobility to improve upon HLM goals.    Patient Centered Rounds: I performed bedside rounds with nursing staff today.   Discussions with Specialists or Other Care Team Provider: Orthopedic surgery    Education and Discussions with Family / Patient: Updated  (sister) via phone.    Total Time Spent on Date of Encounter in care of patient: 35 mins. This time was spent on one or more of the following: performing physical exam; counseling and coordination of care; obtaining or reviewing history; documenting in the medical record; reviewing/ordering tests, medications or procedures; communicating with other healthcare professionals and discussing with patient's family/caregivers.    Current Length of Stay: 2 day(s)  Current Patient Status: Inpatient   Certification Statement: The patient will continue to require additional inpatient hospital stay due to the need for case management to finalize placement options  for rehab  Discharge Plan:  Patient remains medically stable for discharge, case management is working on placement    Code Status: Level 1 - Full Code    Subjective:   Patient seen, resting in bed, appears comfortable, denies pain or discomfort    Objective:     Vitals:   Temp (24hrs), Av.4 °F (36.3 °C), Min:96 °F (35.6 °C), Max:98.3 °F (36.8 °C)    Temp:  [96 °F (35.6 °C)-98.3 °F (36.8 °C)] 97.9 °F (36.6 °C)  HR:  [67-92] 80  Resp:  [18-19] 18  BP: (112-136)/(57-73) 136/73  SpO2:  [93 %-97 %] 94 %  Body mass index is 21.14 kg/m².     Input and Output Summary (last 24 hours):     Intake/Output Summary (Last 24 hours) at 2024 0735  Last data filed at 2024 2100  Gross per 24 hour   Intake 880 ml   Output 400 ml   Net 480 ml       Physical Exam:   Physical Exam  Vitals and nursing note reviewed.   Constitutional:       General: He is not in acute distress.     Appearance: Normal appearance. He is not ill-appearing.   HENT:      Head: Normocephalic and atraumatic.      Nose: Nose normal.   Eyes:      Extraocular Movements: Extraocular movements intact.      Pupils: Pupils are equal, round, and reactive to light.   Cardiovascular:      Rate and Rhythm: Normal rate and regular rhythm.      Pulses: Normal pulses.      Heart sounds: Normal heart sounds. No murmur heard.     No friction rub. No gallop.   Pulmonary:      Effort: Pulmonary effort is normal.      Breath sounds: Normal breath sounds.   Abdominal:      General: There is no distension.      Palpations: Abdomen is soft. There is no mass.      Tenderness: There is no abdominal tenderness. There is no guarding or rebound.   Musculoskeletal:         General: No swelling or tenderness. Normal range of motion.      Cervical back: Normal range of motion and neck supple. No rigidity. No muscular tenderness.      Right lower leg: No edema.      Left lower leg: No edema.   Skin:     General: Skin is warm.      Capillary Refill: Capillary refill takes less than  2 seconds.      Findings: No erythema or rash.   Neurological:      General: No focal deficit present.      Mental Status: He is alert. Mental status is at baseline.      Comments: At baseline in the setting of having cerebral palsy   Psychiatric:         Mood and Affect: Mood normal.         Behavior: Behavior normal.          Additional Data:     Labs:  Results from last 7 days   Lab Units 02/05/24  0525   WBC Thousand/uL 7.71   HEMOGLOBIN g/dL 12.9   HEMATOCRIT % 38.2   PLATELETS Thousands/uL 164   NEUTROS PCT % 39*   LYMPHS PCT % 38   MONOS PCT % 21*   EOS PCT % 1     Results from last 7 days   Lab Units 02/05/24  0525   SODIUM mmol/L 136   POTASSIUM mmol/L 4.2   CHLORIDE mmol/L 99   CO2 mmol/L 29   BUN mg/dL 19   CREATININE mg/dL 0.81   ANION GAP mmol/L 8   CALCIUM mg/dL 9.7   GLUCOSE RANDOM mg/dL 84         Results from last 7 days   Lab Units 02/06/24  0648 02/05/24  2112 02/05/24  1550 02/05/24  1106 02/05/24  0708 02/04/24  2045 02/04/24  1611 02/04/24  1406   POC GLUCOSE mg/dl 91 130 94 110 80 104 85 81               Lines/Drains:  Invasive Devices       Peripheral Intravenous Line  Duration             Peripheral IV 02/05/24 Left;Ventral (anterior) Forearm 1 day                          Imaging: No pertinent imaging reviewed.    Recent Cultures (last 7 days):         Last 24 Hours Medication List:   Current Facility-Administered Medications   Medication Dose Route Frequency Provider Last Rate    acetaminophen  650 mg Oral Q6H PRN HUNTER Main      benztropine  0.5 mg Oral BID HUNTER Main      calcium carbonate  1 tablet Oral TID HUNTER Main      cholecalciferol  1,000 Units Oral Daily HUNTER Main      vitamin B-12  1,000 mcg Oral Every Other Day HUNTER Main      Diclofenac Sodium  2 g Topical 4x Daily Jose Angel Carpenter PA-C      divalproex sodium  1,000 mg Oral HS HUNTER Main      divalproex sodium  750 mg Oral Daily HUNTER Main       docusate sodium  100 mg Oral BID Ashley M Florentino, CRNP      gabapentin  300 mg Oral TID Ashley M Florentino, CRNP      heparin (porcine)  5,000 Units Subcutaneous Q8H RONALD Ashley M Florentino, CRNP      hydrOXYzine HCL  25 mg Oral BID Ashley M Florentino, CRNP      insulin lispro  1-5 Units Subcutaneous TID AC Ashley M Florentino, CRNP      insulin lispro  1-5 Units Subcutaneous HS Ashley M Florentino, CRNP      lacosamide  150 mg Oral Daily Ashley M Florentino, CRNP      lacosamide  200 mg Oral HS Ashley M Florentino, CRNP      levETIRAcetam  1,500 mg Oral Q12H RONALD Ashley M Florentino, CRNP      levothyroxine  150 mcg Oral Early Morning Ashley M Florentino, CRNP      lisinopril  10 mg Oral Daily Ashley M Florentino, CRNP      loratadine  10 mg Oral Daily Ashley M Florentino, CRNP      multivitamin stress formula  1 tablet Oral Daily Ashleyrin Laureanoust, CRNP      pravastatin  80 mg Oral Daily With Dinner Ashleyrin Laureanoust, CRNP      QUEtiapine  200 mg Oral BID Ashley M Florentino, CRNP      QUEtiapine  400 mg Oral HS Ashley M Florentino, CRNP      temazepam  15 mg Oral HS PRN Ashley M Florentino, CRNP      zonisamide  300 mg Oral HS Ashley M [facility], CRNP          Today, Patient Was Seen By: Giuliana Shetty MD    **Please Note: This note may have been constructed using a voice recognition system.**

## 2024-02-06 NOTE — ASSESSMENT & PLAN NOTE
Currently controlled  Continue Depakote 750 mg in the morning and at 1000 mg at bedtime, Vimpat 150 mg in the morning and 200 mg at bedtime, Keppra 750 mg BID, and zonisamide 300 mg at bedtime

## 2024-02-07 ENCOUNTER — TELEPHONE (OUTPATIENT)
Dept: NEUROLOGY | Facility: CLINIC | Age: 59
End: 2024-02-07

## 2024-02-07 LAB
ANION GAP SERPL CALCULATED.3IONS-SCNC: 10 MMOL/L
BASOPHILS # BLD AUTO: 0.05 THOUSANDS/ÂΜL (ref 0–0.1)
BASOPHILS NFR BLD AUTO: 1 % (ref 0–1)
BUN SERPL-MCNC: 18 MG/DL (ref 5–25)
CALCIUM SERPL-MCNC: 9.4 MG/DL (ref 8.4–10.2)
CHLORIDE SERPL-SCNC: 99 MMOL/L (ref 96–108)
CO2 SERPL-SCNC: 29 MMOL/L (ref 21–32)
CREAT SERPL-MCNC: 0.64 MG/DL (ref 0.6–1.3)
EOSINOPHIL # BLD AUTO: 0.25 THOUSAND/ÂΜL (ref 0–0.61)
EOSINOPHIL NFR BLD AUTO: 4 % (ref 0–6)
ERYTHROCYTE [DISTWIDTH] IN BLOOD BY AUTOMATED COUNT: 12.8 % (ref 11.6–15.1)
GFR SERPL CREATININE-BSD FRML MDRD: 109 ML/MIN/1.73SQ M
GLUCOSE SERPL-MCNC: 129 MG/DL (ref 65–140)
GLUCOSE SERPL-MCNC: 179 MG/DL (ref 65–140)
GLUCOSE SERPL-MCNC: 215 MG/DL (ref 65–140)
GLUCOSE SERPL-MCNC: 94 MG/DL (ref 65–140)
GLUCOSE SERPL-MCNC: 94 MG/DL (ref 65–140)
HCT VFR BLD AUTO: 37.7 % (ref 36.5–49.3)
HGB BLD-MCNC: 12.7 G/DL (ref 12–17)
IMM GRANULOCYTES # BLD AUTO: 0.02 THOUSAND/UL (ref 0–0.2)
IMM GRANULOCYTES NFR BLD AUTO: 0 % (ref 0–2)
LYMPHOCYTES # BLD AUTO: 3.59 THOUSANDS/ÂΜL (ref 0.6–4.47)
LYMPHOCYTES NFR BLD AUTO: 51 % (ref 14–44)
MCH RBC QN AUTO: 32.5 PG (ref 26.8–34.3)
MCHC RBC AUTO-ENTMCNC: 33.7 G/DL (ref 31.4–37.4)
MCV RBC AUTO: 96 FL (ref 82–98)
MONOCYTES # BLD AUTO: 1.16 THOUSAND/ÂΜL (ref 0.17–1.22)
MONOCYTES NFR BLD AUTO: 17 % (ref 4–12)
NEUTROPHILS # BLD AUTO: 1.92 THOUSANDS/ÂΜL (ref 1.85–7.62)
NEUTS SEG NFR BLD AUTO: 27 % (ref 43–75)
NRBC BLD AUTO-RTO: 0 /100 WBCS
PLATELET # BLD AUTO: 155 THOUSANDS/UL (ref 149–390)
PMV BLD AUTO: 11.5 FL (ref 8.9–12.7)
POTASSIUM SERPL-SCNC: 4.1 MMOL/L (ref 3.5–5.3)
RBC # BLD AUTO: 3.91 MILLION/UL (ref 3.88–5.62)
SODIUM SERPL-SCNC: 138 MMOL/L (ref 135–147)
VALPROATE FREE SERPL-MCNC: 28.7 UG/ML (ref 6–22)
WBC # BLD AUTO: 6.99 THOUSAND/UL (ref 4.31–10.16)

## 2024-02-07 PROCEDURE — 85025 COMPLETE CBC W/AUTO DIFF WBC: CPT | Performed by: HOSPITALIST

## 2024-02-07 PROCEDURE — 99232 SBSQ HOSP IP/OBS MODERATE 35: CPT | Performed by: HOSPITALIST

## 2024-02-07 PROCEDURE — 80048 BASIC METABOLIC PNL TOTAL CA: CPT | Performed by: HOSPITALIST

## 2024-02-07 PROCEDURE — 82948 REAGENT STRIP/BLOOD GLUCOSE: CPT

## 2024-02-07 RX ADMIN — DICLOFENAC SODIUM 2 G: 10 GEL TOPICAL at 21:22

## 2024-02-07 RX ADMIN — HEPARIN SODIUM 5000 UNITS: 5000 INJECTION INTRAVENOUS; SUBCUTANEOUS at 21:22

## 2024-02-07 RX ADMIN — QUETIAPINE 400 MG: 400 TABLET, FILM COATED, EXTENDED RELEASE ORAL at 21:22

## 2024-02-07 RX ADMIN — GABAPENTIN 300 MG: 300 CAPSULE ORAL at 09:47

## 2024-02-07 RX ADMIN — CALCIUM 1 TABLET: 500 TABLET ORAL at 16:58

## 2024-02-07 RX ADMIN — BENZTROPINE MESYLATE 0.5 MG: 0.5 TABLET ORAL at 17:07

## 2024-02-07 RX ADMIN — QUETIAPINE 200 MG: 400 TABLET, FILM COATED, EXTENDED RELEASE ORAL at 09:46

## 2024-02-07 RX ADMIN — GABAPENTIN 300 MG: 300 CAPSULE ORAL at 16:58

## 2024-02-07 RX ADMIN — HYDROXYZINE HYDROCHLORIDE 25 MG: 50 TABLET, FILM COATED ORAL at 09:47

## 2024-02-07 RX ADMIN — DICLOFENAC SODIUM 2 G: 10 GEL TOPICAL at 17:07

## 2024-02-07 RX ADMIN — HEPARIN SODIUM 5000 UNITS: 5000 INJECTION INTRAVENOUS; SUBCUTANEOUS at 05:35

## 2024-02-07 RX ADMIN — ZONISAMIDE 300 MG: 100 CAPSULE ORAL at 21:22

## 2024-02-07 RX ADMIN — LEVETIRACETAM 1500 MG: 500 TABLET, FILM COATED ORAL at 20:40

## 2024-02-07 RX ADMIN — DICLOFENAC SODIUM 2 G: 10 GEL TOPICAL at 12:04

## 2024-02-07 RX ADMIN — INSULIN LISPRO 2 UNITS: 100 INJECTION, SOLUTION INTRAVENOUS; SUBCUTANEOUS at 16:58

## 2024-02-07 RX ADMIN — DIVALPROEX SODIUM 1000 MG: 500 TABLET, DELAYED RELEASE ORAL at 21:22

## 2024-02-07 RX ADMIN — LEVETIRACETAM 1500 MG: 500 TABLET, FILM COATED ORAL at 09:46

## 2024-02-07 RX ADMIN — HYDROXYZINE HYDROCHLORIDE 25 MG: 50 TABLET, FILM COATED ORAL at 14:54

## 2024-02-07 RX ADMIN — HEPARIN SODIUM 5000 UNITS: 5000 INJECTION INTRAVENOUS; SUBCUTANEOUS at 14:53

## 2024-02-07 RX ADMIN — CALCIUM 1 TABLET: 500 TABLET ORAL at 20:40

## 2024-02-07 RX ADMIN — DIVALPROEX SODIUM 750 MG: 250 TABLET, DELAYED RELEASE ORAL at 09:47

## 2024-02-07 RX ADMIN — GABAPENTIN 300 MG: 300 CAPSULE ORAL at 20:40

## 2024-02-07 RX ADMIN — DOCUSATE SODIUM 100 MG: 100 CAPSULE, LIQUID FILLED ORAL at 09:46

## 2024-02-07 RX ADMIN — LACOSAMIDE 200 MG: 50 TABLET ORAL at 21:22

## 2024-02-07 RX ADMIN — INSULIN LISPRO 1 UNITS: 100 INJECTION, SOLUTION INTRAVENOUS; SUBCUTANEOUS at 21:21

## 2024-02-07 RX ADMIN — PRAVASTATIN SODIUM 80 MG: 40 TABLET ORAL at 16:58

## 2024-02-07 RX ADMIN — DOCUSATE SODIUM 100 MG: 100 CAPSULE, LIQUID FILLED ORAL at 17:07

## 2024-02-07 RX ADMIN — BENZTROPINE MESYLATE 0.5 MG: 0.5 TABLET ORAL at 09:47

## 2024-02-07 RX ADMIN — CYANOCOBALAMIN TAB 500 MCG 1000 MCG: 500 TAB at 09:47

## 2024-02-07 RX ADMIN — LEVOTHYROXINE SODIUM 150 MCG: 75 TABLET ORAL at 05:35

## 2024-02-07 RX ADMIN — ACETAMINOPHEN 650 MG: 325 TABLET ORAL at 22:56

## 2024-02-07 RX ADMIN — LACOSAMIDE 150 MG: 150 TABLET ORAL at 09:46

## 2024-02-07 RX ADMIN — CHOLECALCIFEROL TAB 25 MCG (1000 UNIT) 1000 UNITS: 25 TAB at 09:47

## 2024-02-07 RX ADMIN — LISINOPRIL 10 MG: 10 TABLET ORAL at 09:50

## 2024-02-07 RX ADMIN — QUETIAPINE 200 MG: 400 TABLET, FILM COATED, EXTENDED RELEASE ORAL at 14:53

## 2024-02-07 RX ADMIN — B-COMPLEX W/ C & FOLIC ACID TAB 1 TABLET: TAB at 09:47

## 2024-02-07 RX ADMIN — LORATADINE 10 MG: 10 TABLET ORAL at 09:48

## 2024-02-07 RX ADMIN — TEMAZEPAM 15 MG: 7.5 CAPSULE ORAL at 22:55

## 2024-02-07 RX ADMIN — DICLOFENAC SODIUM 2 G: 10 GEL TOPICAL at 09:50

## 2024-02-07 RX ADMIN — CALCIUM 1 TABLET: 500 TABLET ORAL at 09:47

## 2024-02-07 NOTE — ASSESSMENT & PLAN NOTE
Lab Results   Component Value Date    HGBA1C 5.5 11/27/2023       Recent Labs     02/06/24  1612 02/06/24  2109 02/07/24  0703 02/07/24  1107   POCGLU 95 186* 94 129         Blood Sugar Average: Last 72 hrs:  (P) 109.0658338109141339  Oral hypoglycemic medications remain on hold  Target blood sugar for the hospital is 140-180  Continue Accu-Cheks before meals and at bedtime with sliding scale insulin coverage  Diabetic neuropathy-continue gabapentin

## 2024-02-07 NOTE — PROGRESS NOTES
[facility]  [facility] Note  Name: Jairon Oconnell I  MRN: [id number]  Unit/Bed#: -01 I Date of Admission: 2/4/2024   Date of Service: 2/7/2024 I Hospital Day: 3    Assessment/Plan   * Nondisplaced fracture of greater trochanter of right femur, initial encounter for closed fracture (HCC)  Assessment & Plan  Status post a mechanical fall prior to arrival   CT right lower extremity imaging confirmed a comminuted and nondisplaced fractures of the right greater trochanter with no evidence of femoral neck fracture.   Status post an orthopedic surgical evaluation  No indication at this time for an acute phase inpatient surgical intervention  Okay for weightbearing as tolerated  Continue current pain medication management regimen  Status post a PT and OT evaluation, they recommend postacute rehabilitation services  Update on 2/7/2024 -patient remains medically stable for discharge  Case management is continuing to work on placement options    Cerebral palsy (HCC)  Assessment & Plan  He lives in a group home and has caregiver  Continue supportive care    Essential hypertension  Assessment & Plan  Blood pressure stable  Continue lisinopril    Generalized convulsive epilepsy (HCC)  Assessment & Plan  Currently controlled  Continue Depakote 750 mg in the morning and at 1000 mg at bedtime, Vimpat 150 mg in the morning and 200 mg at bedtime, Keppra 750 mg BID, and zonisamide 300 mg at bedtime      Acquired hypothyroidism  Assessment & Plan  Continue levothyroxine 150 mcg daily    Type 2 diabetes mellitus with diabetic neuropathy, without long-term current use of insulin (HCC)  Assessment & Plan  Lab Results   Component Value Date    HGBA1C 5.5 11/27/2023       Recent Labs     02/06/24  1612 02/06/24  2109 02/07/24  0703 02/07/24  1107   POCGLU 95 186* 94 129         Blood Sugar Average: Last 72 hrs:  (P) 109.4479403146688690  Oral hypoglycemic medications remain on hold  Target blood sugar for the  hospital is 140-180  Continue Accu-Cheks before meals and at bedtime with sliding scale insulin coverage  Diabetic neuropathy-continue gabapentin    Hyperlipidemia  Assessment & Plan  Continue pravastatin    Behavior concern in adult  Assessment & Plan  With impulsive behavior at times  Continue Seroquel 200 mg at 8 AM and 2 PM and 400 mg at bedtime  Continue hydroxyzine 25 mg twice daily  Continue benztropine 0.5 mg twice daily  Supportive care               VTE Pharmacologic Prophylaxis: VTE Score: 6 High Risk (Score >/= 5) - Pharmacological DVT Prophylaxis Ordered: heparin. Sequential Compression Devices Ordered.    Mobility:   Basic Mobility Inpatient Raw Score: 11  JH-HLM Goal: 4: Move to chair/commode  JH-HLM Achieved: 4: Move to chair/commode  HLM Goal NOT achieved. Continue with multidisciplinary rounding and encourage appropriate mobility to improve upon HLM goals.    Patient Centered Rounds: I performed bedside rounds with nursing staff today.   Discussions with Specialists or Other Care Team Provider: Orthopedic surgery    Education and Discussions with Family / Patient: Updated  (sister) via phone.    Total Time Spent on Date of Encounter in care of patient: 40 mins. This time was spent on one or more of the following: performing physical exam; counseling and coordination of care; obtaining or reviewing history; documenting in the medical record; reviewing/ordering tests, medications or procedures; communicating with other healthcare professionals and discussing with patient's family/caregivers.    Current Length of Stay: 3 day(s)  Current Patient Status: Inpatient   Certification Statement: The patient will continue to require additional inpatient hospital stay due to need for placement  Discharge Plan:  Patient remains medically stable for discharge, case management is working on placement    Code Status: Level 1 - Full Code    Subjective:   Patient seen, sitting up in a chair watching a  basketball game, is at baseline from a mentation standpoint.  Answers questions yes and no.    Objective:     Vitals:   Temp (24hrs), Av.9 °F (36.1 °C), Min:96.8 °F (36 °C), Max:97 °F (36.1 °C)    Temp:  [96.8 °F (36 °C)-97 °F (36.1 °C)] 97 °F (36.1 °C)  HR:  [69-86] 79  Resp:  [18] 18  BP: (108-136)/(56-78) 124/72  SpO2:  [93 %-97 %] 96 %  Body mass index is 21.14 kg/m².     Input and Output Summary (last 24 hours):     Intake/Output Summary (Last 24 hours) at 2024 1404  Last data filed at 2024 0716  Gross per 24 hour   Intake 600 ml   Output 650 ml   Net -50 ml       Physical Exam:   Physical Exam  Vitals and nursing note reviewed.   Constitutional:       General: He is not in acute distress.     Appearance: Normal appearance. He is not ill-appearing.   HENT:      Head: Normocephalic and atraumatic.      Nose: Nose normal.   Eyes:      Extraocular Movements: Extraocular movements intact.      Pupils: Pupils are equal, round, and reactive to light.   Cardiovascular:      Rate and Rhythm: Normal rate and regular rhythm.      Pulses: Normal pulses.      Heart sounds: Normal heart sounds. No murmur heard.     No friction rub. No gallop.   Pulmonary:      Effort: Pulmonary effort is normal.      Breath sounds: Normal breath sounds.   Abdominal:      General: There is no distension.      Palpations: Abdomen is soft. There is no mass.      Tenderness: There is no abdominal tenderness. There is no guarding or rebound.   Musculoskeletal:         General: No swelling or tenderness. Normal range of motion.      Cervical back: Normal range of motion and neck supple. No rigidity. No muscular tenderness.      Right lower leg: No edema.      Left lower leg: No edema.   Skin:     General: Skin is warm.      Capillary Refill: Capillary refill takes less than 2 seconds.      Findings: No erythema or rash.   Neurological:      General: No focal deficit present.      Mental Status: He is alert. Mental status is at  baseline.      Comments: At baseline in the setting of having cerebral palsy   Psychiatric:         Mood and Affect: Mood normal.         Behavior: Behavior normal.          Additional Data:     Labs:  Results from last 7 days   Lab Units 02/07/24  0454   WBC Thousand/uL 6.99   HEMOGLOBIN g/dL 12.7   HEMATOCRIT % 37.7   PLATELETS Thousands/uL 155   NEUTROS PCT % 27*   LYMPHS PCT % 51*   MONOS PCT % 17*   EOS PCT % 4     Results from last 7 days   Lab Units 02/07/24  0454   SODIUM mmol/L 138   POTASSIUM mmol/L 4.1   CHLORIDE mmol/L 99   CO2 mmol/L 29   BUN mg/dL 18   CREATININE mg/dL 0.64   ANION GAP mmol/L 10   CALCIUM mg/dL 9.4   GLUCOSE RANDOM mg/dL 94         Results from last 7 days   Lab Units 02/07/24  1107 02/07/24  0703 02/06/24  2109 02/06/24  1612 02/06/24  1119 02/06/24  0648 02/05/24  2112 02/05/24  1550 02/05/24  1106 02/05/24  0708 02/04/24  2045 02/04/24  1611   POC GLUCOSE mg/dl 129 94 186* 95 143* 91 130 94 110 80 104 85               Lines/Drains:  Invasive Devices       Peripheral Intravenous Line  Duration             Peripheral IV 02/05/24 Left;Ventral (anterior) Forearm 2 days                          Imaging: No pertinent imaging reviewed today    Recent Cultures (last 7 days):         Last 24 Hours Medication List:   Current Facility-Administered Medications   Medication Dose Route Frequency Provider Last Rate    acetaminophen  650 mg Oral Q6H PRN HUNTER Main      benztropine  0.5 mg Oral BID HUNTER Main      calcium carbonate  1 tablet Oral TID HUNTER Main      cholecalciferol  1,000 Units Oral Daily HUNTER Main      vitamin B-12  1,000 mcg Oral Every Other Day HUNTER Main      Diclofenac Sodium  2 g Topical 4x Daily Jose Angel Carpenter PA-C      divalproex sodium  1,000 mg Oral HS HUNTER Main      divalproex sodium  750 mg Oral Daily HUNTER Main      docusate sodium  100 mg Oral BID HUNTER Main       gabapentin  300 mg Oral TID Ashley M Florentino, CRNP      heparin (porcine)  5,000 Units Subcutaneous Q8H RONALD Ashley Laureanoust, CRNP      hydrOXYzine HCL  25 mg Oral BID Ashley M Florentino, CRNP      insulin lispro  1-5 Units Subcutaneous TID AC Ashley M Florentino, CRNP      insulin lispro  1-5 Units Subcutaneous HS Ashley M Florentino, CRNP      lacosamide  150 mg Oral Daily Ashley M Florentino, CRNP      lacosamide  200 mg Oral HS Ashleyrin Laureanoust, CRNP      levETIRAcetam  1,500 mg Oral Q12H RONALD Ashley TUCKER Florentino, CRNP      levothyroxine  150 mcg Oral Early Morning Ashleyrin Laureanoust, CRNP      lisinopril  10 mg Oral Daily Ashleyrin Laureanoust, CRNP      loratadine  10 mg Oral Daily Ashleyrin Laureanoust, CRNP      multivitamin stress formula  1 tablet Oral Daily Ashley M Floerntino, CRNP      pravastatin  80 mg Oral Daily With Dinner Ashley TUCKER Florentino, CRNP      QUEtiapine  200 mg Oral BID Ashley Laureanoust, CRNP      QUEtiapine  400 mg Oral HS Ashley Laureanoust, CRNP      temazepam  15 mg Oral HS PRN Ashley Laureanoust, CRNP      zonisamide  300 mg Oral HS Ashley TUCKER Florentino, CRNP          Today, Patient Was Seen By: Giuliana Shetty MD    **Please Note: This note may have been constructed using a voice recognition system.**

## 2024-02-07 NOTE — PLAN OF CARE
Problem: Potential for Falls  Goal: Patient will remain free of falls  Description: INTERVENTIONS:  - Educate patient/family on patient safety including physical limitations  - Instruct patient to call for assistance with activity   - Consult OT/PT to assist with strengthening/mobility   - Keep Call bell within reach  - Keep bed low and locked with side rails adjusted as appropriate  - Keep care items and personal belongings within reach  - Initiate and maintain comfort rounds  - Make Fall Risk Sign visible to staff  - Offer Toileting every 2 Hours, in advance of need  - Initiate/Maintain bed alarm  - Apply yellow socks and bracelet for high fall risk patients  - Consider moving patient to room near nurses station  Outcome: Progressing     Problem: Prexisting or High Potential for Compromised Skin Integrity  Goal: Skin integrity is maintained or improved  Description: INTERVENTIONS:  - Identify patients at risk for skin breakdown  - Assess and monitor skin integrity  - Assess and monitor nutrition and hydration status  - Monitor labs   - Assess for incontinence   - Turn and reposition patient  - Assist with mobility/ambulation  - Relieve pressure over bony prominences  - Avoid friction and shearing  - Provide appropriate hygiene as needed including keeping skin clean and dry  - Evaluate need for skin moisturizer/barrier cream  - Collaborate with interdisciplinary team   - Patient/family teaching  - Consider wound care consult   Outcome: Progressing     Problem: PAIN - ADULT  Goal: Verbalizes/displays adequate comfort level or baseline comfort level  Description: Interventions:  - Encourage patient to monitor pain and request assistance  - Assess pain using appropriate pain scale  - Administer analgesics based on type and severity of pain and evaluate response  - Implement non-pharmacological measures as appropriate and evaluate response  - Consider cultural and social influences on pain and pain management  -  Notify physician/advanced practitioner if interventions unsuccessful or patient reports new pain  Outcome: Progressing     Problem: INFECTION - ADULT  Goal: Absence or prevention of progression during hospitalization  Description: INTERVENTIONS:  - Assess and monitor for signs and symptoms of infection  - Monitor lab/diagnostic results  - Monitor all insertion sites, i.e. indwelling lines, tubes, and drains  - Monitor endotracheal if appropriate and nasal secretions for changes in amount and color  - [place] appropriate cooling/warming therapies per order  - Administer medications as ordered  - Instruct and encourage patient and family to use good hand hygiene technique  - Identify and instruct in appropriate isolation precautions for identified infection/condition  Outcome: Progressing  Goal: Absence of fever/infection during neutropenic period  Description: INTERVENTIONS:  - Monitor WBC    Outcome: Progressing     Problem: SAFETY ADULT  Goal: Maintain or return to baseline ADL function  Description: INTERVENTIONS:  -  Assess patient's ability to carry out ADLs; assess patient's baseline for ADL function and identify physical deficits which impact ability to perform ADLs (bathing, care of mouth/teeth, toileting, grooming, dressing, etc.)  - Assess/evaluate cause of self-care deficits   - Assess range of motion  - Assess patient's mobility; develop plan if impaired  - Assess patient's need for assistive devices and provide as appropriate  - Encourage maximum independence but intervene and supervise when necessary  - Involve family in performance of ADLs  - Assess for home care needs following discharge   - Consider OT consult to assist with ADL evaluation and planning for discharge  - Provide patient education as appropriate  Outcome: Progressing  Goal: Maintains/Returns to pre admission functional level  Description: INTERVENTIONS:  - Perform AM-PAC 6 Click Basic Mobility/ Daily Activity assessment daily.  - Set  and communicate daily mobility goal to care team and patient/family/caregiver.   - Collaborate with rehabilitation services on mobility goals if consulted  - Perform Range of Motion 3 times a day.  - Reposition patient every 2 hours.  - Dangle patient 3 times a day  - Stand patient 3 times a day  - Ambulate patient 3 times a day  - Out of bed to chair 3 times a day   - Out of bed for meals 3 times a day  - Out of bed for toileting  - Record patient progress and toleration of activity level   Outcome: Progressing     Problem: DISCHARGE PLANNING  Goal: Discharge to home or other facility with appropriate resources  Description: INTERVENTIONS:  - Identify barriers to discharge w/patient and caregiver  - Arrange for needed discharge resources and transportation as appropriate  - Identify discharge learning needs (meds, wound care, etc.)  - Arrange for interpretive services to assist at discharge as needed  - Refer to Case Management Department for coordinating discharge planning if the patient needs post-hospital services based on physician/advanced practitioner order or complex needs related to functional status, cognitive ability, or social support system  Outcome: Progressing     Problem: Knowledge Deficit  Goal: Patient/family/caregiver demonstrates understanding of disease process, treatment plan, medications, and discharge instructions  Description: Complete learning assessment and assess knowledge base.  Interventions:  - Provide teaching at level of understanding  - Provide teaching via preferred learning methods  Outcome: Progressing

## 2024-02-07 NOTE — ASSESSMENT & PLAN NOTE
Status post a mechanical fall prior to arrival   CT right lower extremity imaging confirmed a comminuted and nondisplaced fractures of the right greater trochanter with no evidence of femoral neck fracture.   Status post an orthopedic surgical evaluation  No indication at this time for an acute phase inpatient surgical intervention  Okay for weightbearing as tolerated  Continue current pain medication management regimen  Status post a PT and OT evaluation, they recommend postacute rehabilitation services  Update on 2/7/2024 -patient remains medically stable for discharge  Case management is continuing to work on placement options

## 2024-02-07 NOTE — TELEPHONE ENCOUNTER
Fax received from plan. Depakote ER 250mg tablet not on plans formulary and requires either alternate medication or pa.     Patient is currently admitted to hospital under ortho services for leg fracture.      ID: [id number]  GROUP: [id number]  PCN: [id number]  BIN: [id number]    Key: TIO KATZ submitted for 250mg ER brand formulation. Awaiting determination.

## 2024-02-07 NOTE — NURSING NOTE
Patient OOB to chair. Consumed both breakfast and lunch. Denies pain at this time. Chair alarm on, call bell and belongings within reach.

## 2024-02-07 NOTE — TELEPHONE ENCOUNTER
----- Message from Louise Jean-Baptiste sent at 2/7/2024  3:16 PM EST -----  A new drug is needed for this pt

## 2024-02-08 LAB
GLUCOSE SERPL-MCNC: 104 MG/DL (ref 65–140)
GLUCOSE SERPL-MCNC: 125 MG/DL (ref 65–140)
GLUCOSE SERPL-MCNC: 143 MG/DL (ref 65–140)
GLUCOSE SERPL-MCNC: 186 MG/DL (ref 65–140)

## 2024-02-08 PROCEDURE — 97530 THERAPEUTIC ACTIVITIES: CPT

## 2024-02-08 PROCEDURE — 82948 REAGENT STRIP/BLOOD GLUCOSE: CPT

## 2024-02-08 PROCEDURE — 99232 SBSQ HOSP IP/OBS MODERATE 35: CPT | Performed by: HOSPITALIST

## 2024-02-08 PROCEDURE — 97116 GAIT TRAINING THERAPY: CPT

## 2024-02-08 RX ADMIN — INSULIN LISPRO 1 UNITS: 100 INJECTION, SOLUTION INTRAVENOUS; SUBCUTANEOUS at 21:41

## 2024-02-08 RX ADMIN — DIVALPROEX SODIUM 1000 MG: 500 TABLET, DELAYED RELEASE ORAL at 21:40

## 2024-02-08 RX ADMIN — CALCIUM 1 TABLET: 500 TABLET ORAL at 08:07

## 2024-02-08 RX ADMIN — B-COMPLEX W/ C & FOLIC ACID TAB 1 TABLET: TAB at 08:07

## 2024-02-08 RX ADMIN — LORATADINE 10 MG: 10 TABLET ORAL at 08:07

## 2024-02-08 RX ADMIN — GABAPENTIN 300 MG: 300 CAPSULE ORAL at 16:39

## 2024-02-08 RX ADMIN — QUETIAPINE 200 MG: 400 TABLET, FILM COATED, EXTENDED RELEASE ORAL at 14:03

## 2024-02-08 RX ADMIN — QUETIAPINE 200 MG: 400 TABLET, FILM COATED, EXTENDED RELEASE ORAL at 08:39

## 2024-02-08 RX ADMIN — PRAVASTATIN SODIUM 80 MG: 40 TABLET ORAL at 16:39

## 2024-02-08 RX ADMIN — LEVETIRACETAM 1500 MG: 500 TABLET, FILM COATED ORAL at 08:07

## 2024-02-08 RX ADMIN — GABAPENTIN 300 MG: 300 CAPSULE ORAL at 08:15

## 2024-02-08 RX ADMIN — HEPARIN SODIUM 5000 UNITS: 5000 INJECTION INTRAVENOUS; SUBCUTANEOUS at 05:37

## 2024-02-08 RX ADMIN — BENZTROPINE MESYLATE 0.5 MG: 0.5 TABLET ORAL at 08:06

## 2024-02-08 RX ADMIN — CHOLECALCIFEROL TAB 25 MCG (1000 UNIT) 1000 UNITS: 25 TAB at 08:09

## 2024-02-08 RX ADMIN — LEVOTHYROXINE SODIUM 150 MCG: 75 TABLET ORAL at 05:37

## 2024-02-08 RX ADMIN — HYDROXYZINE HYDROCHLORIDE 25 MG: 50 TABLET, FILM COATED ORAL at 08:06

## 2024-02-08 RX ADMIN — LACOSAMIDE 150 MG: 150 TABLET ORAL at 08:06

## 2024-02-08 RX ADMIN — BENZTROPINE MESYLATE 0.5 MG: 0.5 TABLET ORAL at 17:40

## 2024-02-08 RX ADMIN — CALCIUM 1 TABLET: 500 TABLET ORAL at 20:32

## 2024-02-08 RX ADMIN — GABAPENTIN 300 MG: 300 CAPSULE ORAL at 20:32

## 2024-02-08 RX ADMIN — QUETIAPINE 400 MG: 400 TABLET, FILM COATED, EXTENDED RELEASE ORAL at 21:40

## 2024-02-08 RX ADMIN — CALCIUM 1 TABLET: 500 TABLET ORAL at 16:39

## 2024-02-08 RX ADMIN — DOCUSATE SODIUM 100 MG: 100 CAPSULE, LIQUID FILLED ORAL at 17:39

## 2024-02-08 RX ADMIN — DICLOFENAC SODIUM 2 G: 10 GEL TOPICAL at 13:58

## 2024-02-08 RX ADMIN — HEPARIN SODIUM 5000 UNITS: 5000 INJECTION INTRAVENOUS; SUBCUTANEOUS at 21:41

## 2024-02-08 RX ADMIN — HEPARIN SODIUM 5000 UNITS: 5000 INJECTION INTRAVENOUS; SUBCUTANEOUS at 14:03

## 2024-02-08 RX ADMIN — LISINOPRIL 10 MG: 10 TABLET ORAL at 08:05

## 2024-02-08 RX ADMIN — DIVALPROEX SODIUM 750 MG: 250 TABLET, DELAYED RELEASE ORAL at 08:05

## 2024-02-08 RX ADMIN — DICLOFENAC SODIUM 2 G: 10 GEL TOPICAL at 08:14

## 2024-02-08 RX ADMIN — DICLOFENAC SODIUM 2 G: 10 GEL TOPICAL at 21:41

## 2024-02-08 RX ADMIN — LEVETIRACETAM 1500 MG: 500 TABLET, FILM COATED ORAL at 20:32

## 2024-02-08 RX ADMIN — ZONISAMIDE 300 MG: 100 CAPSULE ORAL at 21:40

## 2024-02-08 RX ADMIN — LACOSAMIDE 200 MG: 50 TABLET ORAL at 21:41

## 2024-02-08 RX ADMIN — DICLOFENAC SODIUM 2 G: 10 GEL TOPICAL at 17:40

## 2024-02-08 RX ADMIN — DOCUSATE SODIUM 100 MG: 100 CAPSULE, LIQUID FILLED ORAL at 08:07

## 2024-02-08 RX ADMIN — HYDROXYZINE HYDROCHLORIDE 25 MG: 50 TABLET, FILM COATED ORAL at 14:03

## 2024-02-08 NOTE — PLAN OF CARE
Problem: OCCUPATIONAL THERAPY ADULT  Goal: Performs self-care activities at highest level of function for planned discharge setting.  See evaluation for individualized goals.  Description: Treatment Interventions: ADL retraining, Functional transfer training, UE strengthening/ROM, Endurance training, Patient/family training, Equipment evaluation/education, Compensatory technique education, Energy conservation, Activityengagement, Cognitive reorientation    See flowsheet documentation for full assessment, interventions and recommendations.   2/8/2024 1428 by MAYCOL Adair  Outcome: Not Progressing  Note: Limitation: Decreased ADL status, Decreased UE strength, Decreased Safe judgement during ADL, Decreased endurance, Decreased self-care trans, Decreased high-level ADLs, Decreased cognition  Prognosis: Fair  Assessment: Patient participated in Skilled OT session this date with interventions consisting of ADL re training with the use of correct body mechnaics, safety awareness and fall prevention techniques,  therapeutic activities to: increase activity tolerance, increase postural control, and increase OOB/ sitting tolerance . Patient agreeable to OT treatment session, upon arrival patient was found seated in bed (back supported).  Patient requiring verbal cues for correct technique, cognitive assistance to anticipate next step, one step directives, and frequent rest periods, physical guidance for correct technique, significant physical assistance for functional mobility, and self-care assistance as noted in flow sheet/AM-PAC. Patient continues to be functioning below baseline level, occupational performance remains limited secondary to factors listed above and increased risk for falls and injury.  Patient to benefit from continued Occupational Therapy treatment while in the hospital to address deficits as defined above and maximize level of functional independence with ADLs and functional mobility.     Rehab  Resource Intensity Level, OT: II (Moderate Resource Intensity)     OLAYINKA Adair/JANEL

## 2024-02-08 NOTE — PLAN OF CARE
Problem: Potential for Falls  Goal: Patient will remain free of falls  Description: INTERVENTIONS:  - Educate patient/family on patient safety including physical limitations  - Instruct patient to call for assistance with activity   - Consult OT/PT to assist with strengthening/mobility   - Keep Call bell within reach  - Keep bed low and locked with side rails adjusted as appropriate  - Keep care items and personal belongings within reach  - Initiate and maintain comfort rounds  - Make Fall Risk Sign visible to staff  - Offer Toileting every 2 Hours, in advance of need  - Initiate/Maintain bed alarm  - Apply yellow socks and bracelet for high fall risk patients  - Consider moving patient to room near nurses station  Outcome: Progressing     Problem: Prexisting or High Potential for Compromised Skin Integrity  Goal: Skin integrity is maintained or improved  Description: INTERVENTIONS:  - Identify patients at risk for skin breakdown  - Assess and monitor skin integrity  - Assess and monitor nutrition and hydration status  - Monitor labs   - Assess for incontinence   - Turn and reposition patient  - Assist with mobility/ambulation  - Relieve pressure over bony prominences  - Avoid friction and shearing  - Provide appropriate hygiene as needed including keeping skin clean and dry  - Evaluate need for skin moisturizer/barrier cream  - Collaborate with interdisciplinary team   - Patient/family teaching  - Consider wound care consult   Outcome: Progressing     Problem: PAIN - ADULT  Goal: Verbalizes/displays adequate comfort level or baseline comfort level  Description: Interventions:  - Encourage patient to monitor pain and request assistance  - Assess pain using appropriate pain scale  - Administer analgesics based on type and severity of pain and evaluate response  - Implement non-pharmacological measures as appropriate and evaluate response  - Consider cultural and social influences on pain and pain management  -  Notify physician/advanced practitioner if interventions unsuccessful or patient reports new pain  Outcome: Progressing     Problem: INFECTION - ADULT  Goal: Absence or prevention of progression during hospitalization  Description: INTERVENTIONS:  - Assess and monitor for signs and symptoms of infection  - Monitor lab/diagnostic results  - Monitor all insertion sites, i.e. indwelling lines, tubes, and drains  - Monitor endotracheal if appropriate and nasal secretions for changes in amount and color  - [place] appropriate cooling/warming therapies per order  - Administer medications as ordered  - Instruct and encourage patient and family to use good hand hygiene technique  - Identify and instruct in appropriate isolation precautions for identified infection/condition  Outcome: Progressing  Goal: Absence of fever/infection during neutropenic period  Description: INTERVENTIONS:  - Monitor WBC    Outcome: Progressing     Problem: SAFETY ADULT  Goal: Maintain or return to baseline ADL function  Description: INTERVENTIONS:  -  Assess patient's ability to carry out ADLs; assess patient's baseline for ADL function and identify physical deficits which impact ability to perform ADLs (bathing, care of mouth/teeth, toileting, grooming, dressing, etc.)  - Assess/evaluate cause of self-care deficits   - Assess range of motion  - Assess patient's mobility; develop plan if impaired  - Assess patient's need for assistive devices and provide as appropriate  - Encourage maximum independence but intervene and supervise when necessary  - Involve family in performance of ADLs  - Assess for home care needs following discharge   - Consider OT consult to assist with ADL evaluation and planning for discharge  - Provide patient education as appropriate  Outcome: Progressing  Goal: Maintains/Returns to pre admission functional level  Description: INTERVENTIONS:  - Perform AM-PAC 6 Click Basic Mobility/ Daily Activity assessment daily.  - Set  and communicate daily mobility goal to care team and patient/family/caregiver.   - Collaborate with rehabilitation services on mobility goals if consulted  - Perform Range of Motion 3 times a day.  - Reposition patient every 2 hours.  - Dangle patient 3 times a day  - Stand patient 3 times a day  - Ambulate patient 3 times a day  - Out of bed to chair 3 times a day   - Out of bed for meals 3 times a day  - Out of bed for toileting  - Record patient progress and toleration of activity level   Outcome: Progressing     Problem: DISCHARGE PLANNING  Goal: Discharge to home or other facility with appropriate resources  Description: INTERVENTIONS:  - Identify barriers to discharge w/patient and caregiver  - Arrange for needed discharge resources and transportation as appropriate  - Identify discharge learning needs (meds, wound care, etc.)  - Arrange for interpretive services to assist at discharge as needed  - Refer to Case Management Department for coordinating discharge planning if the patient needs post-hospital services based on physician/advanced practitioner order or complex needs related to functional status, cognitive ability, or social support system  Outcome: Progressing     Problem: Knowledge Deficit  Goal: Patient/family/caregiver demonstrates understanding of disease process, treatment plan, medications, and discharge instructions  Description: Complete learning assessment and assess knowledge base.  Interventions:  - Provide teaching at level of understanding  - Provide teaching via preferred learning methods  Outcome: Progressing

## 2024-02-08 NOTE — PROGRESS NOTES
[facility]  Progress Note  Name: Jairon Oconnell I  MRN: [id number]  Unit/Bed#: -01 I Date of Admission: 2/4/2024   Date of Service: 2/8/2024 I Hospital Day: 4    Assessment/Plan   * Nondisplaced fracture of greater trochanter of right femur, initial encounter for closed fracture (HCC)  Assessment & Plan  Status post a mechanical fall prior to arrival   CT right lower extremity imaging confirmed a comminuted and nondisplaced fractures of the right greater trochanter with no evidence of femoral neck fracture.   Status post an orthopedic surgical evaluation  No indication at this time for an acute phase inpatient surgical intervention  Okay for weightbearing as tolerated  Continue current pain medication management regimen  Status post a PT and OT evaluation, they recommend postacute rehabilitation services  Update on 2/8/2024 -patient remains medically stable for discharge  Case management is continuing to work on placement options, they are waiting on a letter from the area of aging    Cerebral palsy (HCC)  Assessment & Plan  He lives in a group home and has caregiver  Continue supportive care    Essential hypertension  Assessment & Plan  Blood pressure stable  Continue lisinopril    Generalized convulsive epilepsy (HCC)  Assessment & Plan  Currently controlled  Continue Depakote 750 mg in the morning and at 1000 mg at bedtime, Vimpat 150 mg in the morning and 200 mg at bedtime, Keppra 750 mg BID, and zonisamide 300 mg at bedtime      Acquired hypothyroidism  Assessment & Plan  Continue levothyroxine 150 mcg daily    Type 2 diabetes mellitus with diabetic neuropathy, without long-term current use of insulin ([facility])  Assessment & Plan  Lab Results   Component Value Date    HGBA1C 5.5 11/27/2023       Recent Labs     02/07/24  1107 02/07/24  1630 02/07/24  2109 02/08/24  0653   POCGLU 129 215* 179* 125         Blood Sugar Average: Last 72 hrs:  (P) [id number]  Oral hypoglycemic  medications remain on hold  Target blood sugar for the hospital is 140-180  Continue Accu-Cheks before meals and at bedtime with sliding scale insulin coverage  Diabetic neuropathy-continue gabapentin    Hyperlipidemia  Assessment & Plan  Continue pravastatin    Behavior concern in adult  Assessment & Plan  With impulsive behavior at times  Continue Seroquel 200 mg at 8 AM and 2 PM and 400 mg at bedtime  Continue hydroxyzine 25 mg twice daily  Continue benztropine 0.5 mg twice daily  Supportive care               VTE Pharmacologic Prophylaxis: VTE Score: 6 Moderate Risk (Score 3-4) - Pharmacological DVT Prophylaxis Ordered: heparin.    Mobility:   Basic Mobility Inpatient Raw Score: 11  JH-HLM Goal: 4: Move to chair/commode  JH-HLM Achieved: 4: Move to chair/commode  HLM Goal NOT achieved. Continue with multidisciplinary rounding and encourage appropriate mobility to improve upon HLM goals.    Patient Centered Rounds: I performed bedside rounds with nursing staff today.   Discussions with Specialists or Other Care Team Provider: Orthopedic surgery    Education and Discussions with Family / Patient:  Patient's sister was brought up to par last evening.     Total Time Spent on Date of Encounter in care of patient: 30 mins. This time was spent on one or more of the following: performing physical exam; counseling and coordination of care; obtaining or reviewing history; documenting in the medical record; reviewing/ordering tests, medications or procedures; communicating with other healthcare professionals and discussing with patient's family/caregivers.    Current Length of Stay: 4 day(s)  Current Patient Status: Inpatient   Certification Statement: The patient will continue to require additional inpatient hospital stay due to the need for placement  Discharge Plan:  Patient is medically stable for discharge, case management is working on postacute rehab options    Code Status: Level 1 - Full Code    Subjective:    Patient seen, resting in bed, watching TV    Objective:     Vitals:   Temp (24hrs), Av.3 °F (36.3 °C), Min:97.1 °F (36.2 °C), Max:97.7 °F (36.5 °C)    Temp:  [97.1 °F (36.2 °C)-97.7 °F (36.5 °C)] 97.1 °F (36.2 °C)  HR:  [77-95] 77  Resp:  [8-18] 18  BP: (102-132)/(43-79) 102/56  SpO2:  [92 %-98 %] 98 %  Body mass index is 21.14 kg/m².     Input and Output Summary (last 24 hours):     Intake/Output Summary (Last 24 hours) at 2024 1032  Last data filed at 2024 0848  Gross per 24 hour   Intake 778 ml   Output 1200 ml   Net -422 ml       Physical Exam:   Physical Exam  Vitals and nursing note reviewed.   Constitutional:       General: He is not in acute distress.     Appearance: Normal appearance. He is not ill-appearing.   HENT:      Head: Normocephalic and atraumatic.      Nose: Nose normal.   Eyes:      Extraocular Movements: Extraocular movements intact.      Pupils: Pupils are equal, round, and reactive to light.   Cardiovascular:      Rate and Rhythm: Normal rate and regular rhythm.      Pulses: Normal pulses.      Heart sounds: Normal heart sounds. No murmur heard.     No friction rub. No gallop.   Pulmonary:      Effort: Pulmonary effort is normal.      Breath sounds: Normal breath sounds.   Abdominal:      General: There is no distension.      Palpations: Abdomen is soft. There is no mass.      Tenderness: There is no abdominal tenderness. There is no guarding or rebound.   Musculoskeletal:         General: No swelling or tenderness. Normal range of motion.      Cervical back: Normal range of motion and neck supple. No rigidity. No muscular tenderness.      Right lower leg: No edema.      Left lower leg: No edema.   Skin:     General: Skin is warm.      Capillary Refill: Capillary refill takes less than 2 seconds.      Findings: No erythema or rash.   Neurological:      General: No focal deficit present.      Mental Status: He is alert. Mental status is at baseline.      Comments: Answers simple  questions by saying yes or no, is at baseline in the setting of having cerebral palsy   Psychiatric:         Mood and Affect: Mood normal.         Behavior: Behavior normal.          Additional Data:     Labs:  Results from last 7 days   Lab Units 02/07/24  0454   WBC Thousand/uL 6.99   HEMOGLOBIN g/dL 12.7   HEMATOCRIT % 37.7   PLATELETS Thousands/uL 155   NEUTROS PCT % 27*   LYMPHS PCT % 51*   MONOS PCT % 17*   EOS PCT % 4     Results from last 7 days   Lab Units 02/07/24  0454   SODIUM mmol/L 138   POTASSIUM mmol/L 4.1   CHLORIDE mmol/L 99   CO2 mmol/L 29   BUN mg/dL 18   CREATININE mg/dL 0.64   ANION GAP mmol/L 10   CALCIUM mg/dL 9.4   GLUCOSE RANDOM mg/dL 94         Results from last 7 days   Lab Units 02/08/24  0653 02/07/24  2109 02/07/24  1630 02/07/24  1107 02/07/24  0703 02/06/24  2109 02/06/24  1612 02/06/24  1119 02/06/24  0648 02/05/24  2112 02/05/24  1550 02/05/24  1106   POC GLUCOSE mg/dl 125 179* 215* 129 94 186* 95 143* 91 130 94 110               Lines/Drains:  Invasive Devices       Peripheral Intravenous Line  Duration             Peripheral IV 02/05/24 Left;Ventral (anterior) Forearm 3 days                          Imaging: No pertinent imaging reviewed.    Recent Cultures (last 7 days):         Last 24 Hours Medication List:   Current Facility-Administered Medications   Medication Dose Route Frequency Provider Last Rate    acetaminophen  650 mg Oral Q6H PRN HUNTER Main      benztropine  0.5 mg Oral BID HUNTER Main      calcium carbonate  1 tablet Oral TID HUNTER Main      cholecalciferol  1,000 Units Oral Daily HUNTER Main      vitamin B-12  1,000 mcg Oral Every Other Day HUNTER Main      Diclofenac Sodium  2 g Topical 4x Daily Jose Angel Carpenter PA-C      divalproex sodium  1,000 mg Oral HS HUNTER Main      divalproex sodium  750 mg Oral Daily HUNTER Main      docusate sodium  100 mg Oral BID HUNTER Main       gabapentin  300 mg Oral TID Ashley M Florentino, CRNP      heparin (porcine)  5,000 Units Subcutaneous Q8H RONALD Ashley Laureanoust, CRNP      hydrOXYzine HCL  25 mg Oral BID Ashley M Florentino, CRNP      insulin lispro  1-5 Units Subcutaneous TID AC Ashley M Florentino, CRNP      insulin lispro  1-5 Units Subcutaneous HS Ashley M Florentino, CRNP      lacosamide  150 mg Oral Daily Ashley M Florentino, CRNP      lacosamide  200 mg Oral HS Ashleyrin Laureanoust, CRNP      levETIRAcetam  1,500 mg Oral Q12H RONALD Ashley TUCKER Florentino, CRNP      levothyroxine  150 mcg Oral Early Morning Ashleyrin Laureanoust, CRNP      lisinopril  10 mg Oral Daily Ashleyrin Laureanoust, CRNP      loratadine  10 mg Oral Daily Ashleyrin Laureanoust, CRNP      multivitamin stress formula  1 tablet Oral Daily Ashley M Florentino, CRNP      pravastatin  80 mg Oral Daily With Dinner Ashley TUCKER Florentino, CRNP      QUEtiapine  200 mg Oral BID Ashley Laureanoust, CRNP      QUEtiapine  400 mg Oral HS Ashley Laureanoust, CRNP      temazepam  15 mg Oral HS PRN Ashley Laureanoust, CRNP      zonisamide  300 mg Oral HS Ashley TUCKER Florentino, CRNP          Today, Patient Was Seen By: Giuliana Shetty MD    **Please Note: This note may have been constructed using a voice recognition system.**

## 2024-02-08 NOTE — CASE MANAGEMENT
Case Management Discharge Planning Note    Patient name Jairon Oconnell  Location /-01 MRN [id number]  : 1965 Date 2024       Current Admission Date: 2024  Current Admission Diagnosis:Nondisplaced fracture of greater trochanter of right femur, initial encounter for closed fracture ([facility])   Patient Active Problem List    Diagnosis Date Noted    Nondisplaced fracture of greater trochanter of right femur, initial encounter for closed fracture ([facility]) 2024    Closed nondisplaced fracture of proximal phalanx of left index finger with routine healing, subsequent encounter 10/30/2023    Pressure ulcer of sacral region, stage 3 ([facility]) 2023    Ambulatory dysfunction 2022    Social problem 2021    Hypomagnesemia 2021    Thrombocytopathia ([facility]) 2020    Hyponatremia 2019    Metabolic encephalopathy 2019    Seborrheic dermatitis of scalp 2019    Nearsightedness 2019    Behavior concern in adult 2019    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[facility]) 2019    Vitamin B12 deficiency 2017    Hyperlipidemia 2016    Vitamin D deficiency 2016    Type 2 diabetes mellitus with diabetic neuropathy, without long-term current use of insulin ([facility]) 06/15/2016    Acquired hypothyroidism 06/15/2016    Cataract 2013    Cerebral palsy ([facility]) 2013    Generalized convulsive epilepsy ([facility]) 2013    Essential hypertension 2013    Osteoporosis 2013    Scoliosis 2013      LOS (days): 3  Geometric Mean LOS (GMLOS) (days): 2.8  Days to GMLOS:-0.5     OBJECTIVE:  Risk of Unplanned Readmission Score: 15.29         Current admission status: Inpatient   Preferred Pharmacy:   [facility] [place] - [address]  [address] [address]  Phone: [phone] Fax: [phone]    Primary Care Provider: HUNTER Brown    Primary Insurance: MEDICARE  Secondary Insurance:  [place] [employer]    DISCHARGE DETAILS:    Discharge planning discussed with:: patient & sister & caregiver at the bedside  Freedom of Choice: Yes  Comments - Freedom of Choice: recommendation is for rehab- permission was given to send referrals via maribel- determination letter is needed  CM contacted family/caregiver?: Yes             Contacts  Patient Contacts: Kemi (sister)  Jaylyn (care giver)  Relationship to Patient:: Other (Comment) (sister & caregiver)  Contact Method: In Person  Reason/Outcome: Discharge Planning    Requested Home Health Care         Is the patient interested in HHC at discharge?: No    DME Referral Provided  Referral made for DME?: No    Other Referral/Resources/Interventions Provided:  Interventions: Short Term Rehab  Referral Comments: referrals sent for rehab - option process started- information was sent to the area of aging    Would you like to participate in our [facility] service program?  : No - Declined    Treatment Team Recommendation: Short Term Rehab (snf rehab- determination letter is needed- TBD)

## 2024-02-08 NOTE — PHYSICAL THERAPY NOTE
"   PHYSICAL THERAPY TREATMENT NOTE  NAME:  Jairon Oconnell  DATE: 02/08/24    Length Of Stay: 4  Performed at least 2 patient identifiers during session: Name and ID bracelet    TREATMENT:      02/08/24 1054   PT Last Visit   PT Visit Date 02/08/24   Note Type   Note Type Treatment   Pain Assessment   Pain Assessment Tool FLACC   Pain Rating: FLACC (Rest) - Face 0   Pain Rating: FLACC (Rest) - Legs 0   Pain Rating: FLACC (Rest) - Activity 0   Pain Rating: FLACC (Rest) - Cry 0   Pain Rating: FLACC (Rest) - Consolability 0   Score: FLACC (Rest) 0   Pain Rating: FLACC (Activity) - Face 1   Pain Rating: FLACC (Activity) - Legs 1   Pain Rating: FLACC (Activity) - Activity 1   Pain Rating: FLACC (Activity) - Cry 1   Pain Rating: FLACC (Activity) - Consolability 1   Score: FLACC (Activity) 5   Restrictions/Precautions   Weight Bearing Precautions Per Order Yes   RLE Weight Bearing Per Order WBAT  (no active ABDuction)   Other Precautions Chair Alarm;Bed Alarm;Seizure;Fall Risk;Pain;WBS;Cognitive   General   Chart Reviewed Yes   Family/Caregiver Present Yes  (\"foster\" sister)   Cognition   Overall Cognitive Status Impaired   Arousal/Participation Responsive;Cooperative   Attention Difficulty attending to directions   Orientation Level Oriented to person   Memory Unable to assess   Following Commands Follows one step commands with increased time or repetition   Comments pt pleasant and cooperative   Bed Mobility   Supine to Sit 2  Maximal assistance   Additional items Assist x 1;HOB elevated;Increased time required;LE management;Verbal cues   Additional Comments pt remained OOB in recliner at end of session   Transfers   Sit to Stand 2  Maximal assistance   Additional items Assist x 2;Increased time required;Verbal cues   Stand to Sit 2  Maximal assistance   Additional items Assist x 2;Increased time required;Verbal cues;Impulsive   Stand pivot 2  Maximal assistance   Additional items Assist x 2;Increased time " required;Verbal cues   Additional Comments attempted initially with RW, patient unable to utilize safely, completed transfer with HHA/anxilarry supportx2   Ambulation/Elevation   Gait pattern Improper Weight shift;Antalgic;Narrow GORGE;Decreased foot clearance;Decreased R stance;Short stride;Step to;Excessively slow   Gait Assistance 2  Maximal assist   Additional items Assist x 2   Assistive Device   (HHAx2)   Distance 2-3 ft bed to chair   Balance   Static Sitting Fair   Dynamic Sitting Poor +   Static Standing Poor +   Dynamic Standing Poor   Ambulatory Poor -   Endurance Deficit   Endurance Deficit Yes   Activity Tolerance   Activity Tolerance Patient limited by pain;Other (Comment)  (cognition)   Medical Staff Made Aware coordination of care provided with OLAYINKA Valenzuela   Nurse Made Aware RN made aware of outcomes   Assessment   Prognosis Fair   Problem List Decreased strength;Decreased endurance;Impaired balance;Decreased mobility;Decreased coordination;Decreased cognition;Impaired judgement;Decreased safety awareness;Orthopedic restrictions;Pain   Assessment Pt seen for PT treatment session this date with interventions consisting of gait training w/ emphasis on improving pt's ability to ambulate level surfaces x 2-3 ftx1 with MaxAx2 provided by therapist with HHA x2 and therapeutic activity consisting of training: bed mobility, supine<>sit transfers, and sit<>stand transfers. Pt agreeable to PT treatment session upon arrival, pt found supine in bed w/ HOB elevated, in no apparent distress and responsive. In comparison to previous session, pt with no improvements as evidenced by continues to require extensive assistx2 for safe mobility . Post session: pt returned back to recliner. Continue to recommend Level II (Moderate Resource Intensity at time of d/c in order to maximize pt's functional independence and safety w/ mobility. Pt continues to be functioning below baseline level. PT will continue to see pt during  current hospitalization in order to address the deficits listed above and provide interventions consistent w/ POC in effort to achieve STGs.    Armida rOtiz PTA   Barriers to Discharge Inaccessible home environment   Goals   STG Expiration Date 02/15/24   Plan   Treatment/Interventions Functional transfer training;LE strengthening/ROM;Therapeutic exercise;Endurance training;Bed mobility;Gait training;Cognitive reorientation;Patient/family training;Equipment eval/education;Spoke to nursing   PT Frequency 3-5x/wk   Discharge Recommendation   Rehab Resource Intensity Level, PT II (Moderate Resource Intensity)   AM-PAC Basic Mobility Inpatient   Turning in Flat Bed Without Bedrails 2   Lying on Back to Sitting on Edge of Flat Bed Without Bedrails 2   Moving Bed to Chair 2   Standing Up From Chair Using Arms 2   Walk in Room 2   Climb 3-5 Stairs With Railing 1   Basic Mobility Inpatient Raw Score 11   Basic Mobility Standardized Score 30.25   Highest Level Of Mobility   -[facility] Goal 4: Move to chair/commode   JH-HLM Achieved 4: Move to chair/commode   End of Consult   Patient Position at End of Consult Bedside chair;Bed/Chair alarm activated;All needs within reach       The patient's AM-PAC Basic Mobility Inpatient Short Form Raw Score is 11. A Raw score of less than or equal to 16 suggests the patient may benefit from discharge to post-acute rehabilitation services. Please also refer to the recommendation of the Physical Therapist for safe discharge planning.      Armida Ortiz PTA,PTA

## 2024-02-08 NOTE — OCCUPATIONAL THERAPY NOTE
02/08/24 1049   OT Last Visit   OT Visit Date 02/08/24   Note Type   Note Type Treatment   Pain Assessment   Pain Assessment Tool FLACC   Pain Rating: FLACC (Rest) - Face 0   Pain Rating: FLACC (Rest) - Legs 0   Pain Rating: FLACC (Rest) - Activity 0   Pain Rating: FLACC (Rest) - Cry 0   Pain Rating: FLACC (Rest) - Consolability 0   Score: FLACC (Rest) 0   Pain Rating: FLACC (Activity) - Face 1   Pain Rating: FLACC (Activity) - Legs 1   Pain Rating: FLACC (Activity) - Activity 1   Pain Rating: FLACC (Activity) - Cry 1   Pain Rating: FLACC (Activity) - Consolability 1   Score: FLACC (Activity) 5   Restrictions/Precautions   Weight Bearing Precautions Per Order Yes   RLE Weight Bearing Per Order WBAT   Other Precautions Cognitive;Chair Alarm;Bed Alarm;Seizure;WBS;Fall Risk;Pain   Lifestyle   Reciprocal Relationships his 'sister' (through being adopted by her family) entered during session > she was strongly encouraging of patient   Intrinsic Gratification patient enjoys watching TV; previously was known to enjoy coloring  (*provided coloring materials for bedside use)   ADL   Where Assessed Other (Comment)  (seated in bed)   Eating Comments *PCA staff reported patient dependent for feeding due to tremors > with feedback of sister, provided patient with weighted/built-up utensils which patient was able to demos. successful use of   Grooming Comments patient wiped face and hands as directed with fair-plus results   Bed Mobility   Supine to Sit 2  Maximal assistance   Additional items Assist x 1;HOB elevated;Increased time required;LE management;Verbal cues  (patient maintained steady EOB sitting for approx.)   Additional Comments patient remained OOB in recliner at end of session   Transfers   Sit to Stand 2  Maximal assistance   Additional items Assist x 2;Increased time required;Verbal cues   Stand to Sit 2  Maximal assistance   Additional items Assist x 2;Increased time required;Verbal cues;Impulsive   Stand pivot  2  Maximal assistance   Additional items Assist x 2;Increased time required;Verbal cues   Additional Comments *using handheld assist.* X2 > patient unable to utilize RW correctly/safely on first attempt to step from EOB   Cognition   Overall Cognitive Status Impaired   Arousal/Participation Responsive;Cooperative   Attention Difficulty attending to directions   Orientation Level Oriented to person   Memory Unable to assess   Following Commands Follows one step commands with increased time or repetition   Activity Tolerance   Activity Tolerance Patient limited by pain  (*and by limited understanding/cognitive limitations)   Medical Staff Made Aware nurse Lopez aware of session   Assessment   Assessment Patient participated in Skilled OT session this date with interventions consisting of ADL re training with the use of correct body mechnaics, safety awareness and fall prevention techniques,  therapeutic activities to: increase activity tolerance, increase postural control, and increase OOB/ sitting tolerance . Patient agreeable to OT treatment session, upon arrival patient was found seated in bed (back supported).  Patient requiring verbal cues for correct technique, cognitive assistance to anticipate next step, one step directives, and frequent rest periods, physical guidance for correct technique, significant physical assistance for functional mobility, and self-care assistance as noted in flow sheet/AM-PAC. Patient continues to be functioning below baseline level, occupational performance remains limited secondary to factors listed above and increased risk for falls and injury.  Patient to benefit from continued Occupational Therapy treatment while in the hospital to address deficits as defined above and maximize level of functional independence with ADLs and functional mobility.   Plan   Treatment Interventions ADL retraining;Functional transfer training;Patient/family training;Activityengagement;Compensatory  technique education   Goal Expiration Date 02/15/24   OT Treatment Day 1   Discharge Recommendation   Rehab Resource Intensity Level, OT II (Moderate Resource Intensity)   Additional Comments 2 The patient's raw score on the AM-PAC Daily Activity Inpatient Short Form is 11. A raw score of less than 19 suggests the patient may benefit from discharge to post-acute rehabilitation services. Please refer to the recommendation of the Occupational Therapist for safe discharge planning.   AM-PAC Daily Activity Inpatient   Lower Body Dressing 1   Bathing 2   Toileting 2  (patient assisted with use of urinal this session)   Upper Body Dressing 2   Grooming 2   Eating 2  (will trial Adaptive utensils (*which patient uses at home))   Daily Activity Raw Score 11   Daily Activity Standardized Score (Calc for Raw Score >=11) 29.04   AM-PAC Applied Cognition Inpatient   Following a Speech/Presentation 2   Understanding Ordinary Conversation 2   Taking Medications 1   Remembering Where Things Are Placed or Put Away 1   Remembering List of 4-5 Errands 1   Taking Care of Complicated Tasks 1   Applied Cognition Raw Score 8   Applied Cognition Standardized Score 19.32   **COMPLETION:  (see under Bed Mobility: Additional Items:  patient maintained steady EOB sitting for approx. 10 minutes*)  DELFINO Adair

## 2024-02-08 NOTE — PLAN OF CARE
Problem: PHYSICAL THERAPY ADULT  Goal: Performs mobility at highest level of function for planned discharge setting.  See evaluation for individualized goals.  Description: Treatment/Interventions: Functional transfer training, LE strengthening/ROM, Therapeutic exercise, Endurance training, Cognitive reorientation, Patient/family training, Equipment eval/education, Bed mobility, Gait training, Spoke to nursing, Spoke to case management, OT  Equipment Recommended:  (continue TBD pending progress, RW at this time)       See flowsheet documentation for full assessment, interventions and recommendations.  Outcome: Not Progressing  Note: Prognosis: Fair  Problem List: Decreased strength, Decreased endurance, Impaired balance, Decreased mobility, Decreased coordination, Decreased cognition, Impaired judgement, Decreased safety awareness, Orthopedic restrictions, Pain  Assessment: Pt seen for PT treatment session this date with interventions consisting of gait training w/ emphasis on improving pt's ability to ambulate level surfaces x 2-3 ftx1 with MaxAx2 provided by therapist with HHA x2 and therapeutic activity consisting of training: bed mobility, supine<>sit transfers, and sit<>stand transfers. Pt agreeable to PT treatment session upon arrival, pt found supine in bed w/ HOB elevated, in no apparent distress and responsive. In comparison to previous session, pt with no improvements as evidenced by continues to require extensive assistx2 for safe mobility . Post session: pt returned back to recliner. Continue to recommend Level II (Moderate Resource Intensity at time of d/c in order to maximize pt's functional independence and safety w/ mobility. Pt continues to be functioning below baseline level. PT will continue to see pt during current hospitalization in order to address the deficits listed above and provide interventions consistent w/ POC in effort to achieve STGs.    Armida Ortiz, PTA  Barriers to  Discharge: Inaccessible home environment     Rehab Resource Intensity Level, PT: II (Moderate Resource Intensity)    See flowsheet documentation for full assessment.

## 2024-02-08 NOTE — ASSESSMENT & PLAN NOTE
Status post a mechanical fall prior to arrival   CT right lower extremity imaging confirmed a comminuted and nondisplaced fractures of the right greater trochanter with no evidence of femoral neck fracture.   Status post an orthopedic surgical evaluation  No indication at this time for an acute phase inpatient surgical intervention  Okay for weightbearing as tolerated  Continue current pain medication management regimen  Status post a PT and OT evaluation, they recommend postacute rehabilitation services  Update on 2/8/2024 -patient remains medically stable for discharge  Case management is continuing to work on placement options, they are waiting on a letter from the area of aging

## 2024-02-08 NOTE — ASSESSMENT & PLAN NOTE
Lab Results   Component Value Date    HGBA1C 5.5 11/27/2023       Recent Labs     02/07/24  1107 02/07/24  1630 02/07/24  2109 02/08/24  0653   POCGLU 129 215* 179* 125         Blood Sugar Average: Last 72 hrs:  (P) [id number]  Oral hypoglycemic medications remain on hold  Target blood sugar for the hospital is 140-180  Continue Accu-Cheks before meals and at bedtime with sliding scale insulin coverage  Diabetic neuropathy-continue gabapentin

## 2024-02-09 ENCOUNTER — TELEPHONE (OUTPATIENT)
Dept: PSYCHIATRY | Facility: CLINIC | Age: 59
End: 2024-02-09

## 2024-02-09 ENCOUNTER — APPOINTMENT (OUTPATIENT)
Dept: OCCUPATIONAL THERAPY | Facility: CLINIC | Age: 59
End: 2024-02-09
Payer: MEDICARE

## 2024-02-09 LAB
GLUCOSE SERPL-MCNC: 106 MG/DL (ref 65–140)
GLUCOSE SERPL-MCNC: 114 MG/DL (ref 65–140)
GLUCOSE SERPL-MCNC: 148 MG/DL (ref 65–140)
GLUCOSE SERPL-MCNC: 167 MG/DL (ref 65–140)

## 2024-02-09 PROCEDURE — 97110 THERAPEUTIC EXERCISES: CPT

## 2024-02-09 PROCEDURE — 99232 SBSQ HOSP IP/OBS MODERATE 35: CPT | Performed by: HOSPITALIST

## 2024-02-09 PROCEDURE — G0426 INPT/ED TELECONSULT50: HCPCS | Performed by: PSYCHIATRY & NEUROLOGY

## 2024-02-09 PROCEDURE — 97530 THERAPEUTIC ACTIVITIES: CPT

## 2024-02-09 PROCEDURE — 97116 GAIT TRAINING THERAPY: CPT

## 2024-02-09 PROCEDURE — 82948 REAGENT STRIP/BLOOD GLUCOSE: CPT

## 2024-02-09 RX ADMIN — HYDROXYZINE HYDROCHLORIDE 25 MG: 50 TABLET, FILM COATED ORAL at 09:28

## 2024-02-09 RX ADMIN — GABAPENTIN 300 MG: 300 CAPSULE ORAL at 20:35

## 2024-02-09 RX ADMIN — HEPARIN SODIUM 5000 UNITS: 5000 INJECTION INTRAVENOUS; SUBCUTANEOUS at 22:10

## 2024-02-09 RX ADMIN — LISINOPRIL 10 MG: 10 TABLET ORAL at 09:29

## 2024-02-09 RX ADMIN — GABAPENTIN 300 MG: 300 CAPSULE ORAL at 09:29

## 2024-02-09 RX ADMIN — HEPARIN SODIUM 5000 UNITS: 5000 INJECTION INTRAVENOUS; SUBCUTANEOUS at 05:17

## 2024-02-09 RX ADMIN — CYANOCOBALAMIN TAB 500 MCG 1000 MCG: 500 TAB at 09:28

## 2024-02-09 RX ADMIN — DOCUSATE SODIUM 100 MG: 100 CAPSULE, LIQUID FILLED ORAL at 17:03

## 2024-02-09 RX ADMIN — DICLOFENAC SODIUM 2 G: 10 GEL TOPICAL at 09:45

## 2024-02-09 RX ADMIN — LEVETIRACETAM 1500 MG: 500 TABLET, FILM COATED ORAL at 09:29

## 2024-02-09 RX ADMIN — BENZTROPINE MESYLATE 0.5 MG: 0.5 TABLET ORAL at 09:30

## 2024-02-09 RX ADMIN — CALCIUM 1 TABLET: 500 TABLET ORAL at 20:35

## 2024-02-09 RX ADMIN — QUETIAPINE 200 MG: 400 TABLET, FILM COATED, EXTENDED RELEASE ORAL at 14:06

## 2024-02-09 RX ADMIN — PRAVASTATIN SODIUM 80 MG: 40 TABLET ORAL at 16:40

## 2024-02-09 RX ADMIN — GABAPENTIN 300 MG: 300 CAPSULE ORAL at 16:40

## 2024-02-09 RX ADMIN — B-COMPLEX W/ C & FOLIC ACID TAB 1 TABLET: TAB at 09:30

## 2024-02-09 RX ADMIN — DICLOFENAC SODIUM 2 G: 10 GEL TOPICAL at 12:14

## 2024-02-09 RX ADMIN — INSULIN LISPRO 1 UNITS: 100 INJECTION, SOLUTION INTRAVENOUS; SUBCUTANEOUS at 12:12

## 2024-02-09 RX ADMIN — QUETIAPINE 400 MG: 400 TABLET, FILM COATED, EXTENDED RELEASE ORAL at 22:10

## 2024-02-09 RX ADMIN — QUETIAPINE 200 MG: 400 TABLET, FILM COATED, EXTENDED RELEASE ORAL at 09:29

## 2024-02-09 RX ADMIN — HEPARIN SODIUM 5000 UNITS: 5000 INJECTION INTRAVENOUS; SUBCUTANEOUS at 14:01

## 2024-02-09 RX ADMIN — CALCIUM 1 TABLET: 500 TABLET ORAL at 16:40

## 2024-02-09 RX ADMIN — DIVALPROEX SODIUM 1000 MG: 500 TABLET, DELAYED RELEASE ORAL at 22:10

## 2024-02-09 RX ADMIN — DICLOFENAC SODIUM 2 G: 10 GEL TOPICAL at 22:10

## 2024-02-09 RX ADMIN — ZONISAMIDE 300 MG: 100 CAPSULE ORAL at 22:09

## 2024-02-09 RX ADMIN — LORATADINE 10 MG: 10 TABLET ORAL at 09:29

## 2024-02-09 RX ADMIN — CALCIUM 1 TABLET: 500 TABLET ORAL at 09:28

## 2024-02-09 RX ADMIN — DICLOFENAC SODIUM 2 G: 10 GEL TOPICAL at 17:03

## 2024-02-09 RX ADMIN — LEVETIRACETAM 1500 MG: 500 TABLET, FILM COATED ORAL at 20:35

## 2024-02-09 RX ADMIN — DOCUSATE SODIUM 100 MG: 100 CAPSULE, LIQUID FILLED ORAL at 09:28

## 2024-02-09 RX ADMIN — LACOSAMIDE 150 MG: 150 TABLET ORAL at 09:39

## 2024-02-09 RX ADMIN — LACOSAMIDE 200 MG: 50 TABLET ORAL at 22:09

## 2024-02-09 RX ADMIN — DIVALPROEX SODIUM 750 MG: 250 TABLET, DELAYED RELEASE ORAL at 09:29

## 2024-02-09 RX ADMIN — HYDROXYZINE HYDROCHLORIDE 25 MG: 50 TABLET, FILM COATED ORAL at 14:01

## 2024-02-09 RX ADMIN — CHOLECALCIFEROL TAB 25 MCG (1000 UNIT) 1000 UNITS: 25 TAB at 09:30

## 2024-02-09 RX ADMIN — BENZTROPINE MESYLATE 0.5 MG: 0.5 TABLET ORAL at 17:03

## 2024-02-09 RX ADMIN — LEVOTHYROXINE SODIUM 150 MCG: 75 TABLET ORAL at 05:17

## 2024-02-09 NOTE — ASSESSMENT & PLAN NOTE
Lab Results   Component Value Date    HGBA1C 5.5 11/27/2023       Recent Labs     02/08/24  1058 02/08/24  1616 02/08/24 2048 02/09/24  0649   POCGLU 104 143* 186* 106         Blood Sugar Average: Last 72 hrs:  (P) [id number]  Oral hypoglycemic medications remain on hold  Target blood sugar for the hospital is 140-180  Continue Accu-Cheks before meals and at bedtime with sliding scale insulin coverage  Diabetic neuropathy-continue gabapentin

## 2024-02-09 NOTE — OCCUPATIONAL THERAPY NOTE
Occupational Therapy Treatment Note      Jairon MADERA Kourtney    2/9/2024    Principal Problem:    Nondisplaced fracture of greater trochanter of right femur, initial encounter for closed fracture (HCC)  Active Problems:    Cerebral palsy (HCC)    Type 2 diabetes mellitus with diabetic neuropathy, without long-term current use of insulin (HCC)    Generalized convulsive epilepsy (HCC)    Hyperlipidemia    Essential hypertension    Acquired hypothyroidism    Behavior concern in adult      Past Medical History:   Diagnosis Date    ADRIANA (acute kidney injury) ([facility]) 9/24/2019    Bacteremia due to Gram-positive bacteria 9/7/2021    Buttock wound, left, subsequent encounter 11/5/2021    COVID-19     CP (cerebral palsy) ([facility])     Depression     Diabetes ([facility])     Disease of thyroid gland     Gait disorder     Gluteal abscess 9/6/2021    Hyperlipidemia     Hypertension     Kidney failure     Kidney stones     Moderate protein-calorie malnutrition ([facility]) 12/22/2021    Osteoporosis     Pressure injury of left buttock, stage 4 ([facility]) 3/9/2022    Psychiatric disorder     Scoliosis     Seizures ([facility])     Sepsis ([facility]) 9/25/2019    Thyroid disease     UTI (urinary tract infection)        Past Surgical History:   Procedure Laterality Date    APPENDECTOMY      IR PICC PLACEMENT SINGLE LUMEN  9/13/2021    IR PICC PLACEMENT SINGLE LUMEN  9/16/2021 02/09/24 1046   OT Last Visit   OT Visit Date 02/09/24   Note Type   Note Type Treatment   Pain Assessment   Pain Assessment Tool FLACC   Pain Score No Pain   Pain Rating: FLACC (Rest) - Face 0   Pain Rating: FLACC (Rest) - Legs 0   Pain Rating: FLACC (Rest) - Activity 0   Pain Rating: FLACC (Rest) - Cry 0   Pain Rating: FLACC (Rest) - Consolability 0   Score: FLACC (Rest) 0   Pain Rating: FLACC (Activity) - Face 1   Pain Rating: FLACC (Activity) - Legs 1   Pain Rating: FLACC (Activity) - Activity 1   Pain Rating: FLACC (Activity) - Cry 1   Pain Rating: FLACC (Activity) - Consolability 1    Score: FLACC (Activity) 5   Restrictions/Precautions   Weight Bearing Precautions Per Order Yes   RLE Weight Bearing Per Order WBAT  (+ no active hip ABDuction)   Other Precautions Cognitive;Chair Alarm;Bed Alarm;Impulsive;WBS;Fall Risk;Pain   Bed Mobility   Supine to Sit 4  Minimal assistance   Additional items Assist x 1;HOB elevated;Increased time required   Additional Comments Pt remained OOB in recliner upon conclusion   Transfers   Sit to Stand 4  Minimal assistance   Additional items Assist x 2;Armrests;Increased time required   Stand to Sit 4  Minimal assistance   Additional items Assist x 2;Increased time required;Verbal cues   Stand pivot 4  Minimal assistance   Additional items Assist x 2;Increased time required;Verbal cues   Functional Mobility   Functional Mobility 4  Minimal assistance   Additional Comments A x2   Additional items Rolling walker   ROM- Right Upper Extremities   R Shoulder AROM;Flexion;Extension  (pro/ret)   R Elbow AROM;Elbow flexion;Elbow extension   R Position Seated   R Weight/Reps/Sets no weight, 10-15 reps, 1 set   RUE ROM Comment at times requires AAROM due to cognition   ROM - Left Upper Extremities    L Shoulder AROM;Flexion;Extension  (pro/ret)   L Elbow AROM;Elbow flexion;Elbow extension   L Position Seated   L Weight/Reps/Sets no weight, 10-15 reps, 1 set   LUE ROM Comment at times requires AAROM due to cognition   Cognition   Overall Cognitive Status Impaired   Arousal/Participation Alert;Responsive   Attention Difficulty attending to directions   Orientation Level Oriented to person   Memory Unable to assess   Following Commands Follows one step commands with increased time or repetition   Activity Tolerance   Activity Tolerance Patient limited by fatigue;Patient limited by pain;Other (Comment)  (cognition)   Medical Staff Made Aware DOUGLAS Elmore   Assessment   Assessment Patient participated in Skilled OT session this date with interventions consisting of therapeutic  exercise to: increase functional use of BUEs, increase BUE muscle strength  and  therapeutic activities to: increase activity tolerance. Patient agreeable to OT treatment session, upon arrival patient was found supine in bed.  In comparison to previous session, patient with improvements in functional transfers . Patient requiring verbal cues for safety, verbal cues for correct technique, and frequent rest periods. Patient continues to be functioning below baseline level, occupational performance remains limited secondary to factors listed above and increased risk for falls and injury. From OT standpoint, recommendation at time of d/c would be Level II (Moderate Resource Intensity. Patient to benefit from continued Occupational Therapy treatment while in the hospital to address deficits as defined above and maximize level of functional independence with ADLs and functional mobility.   Plan   Treatment Interventions ADL retraining;Functional transfer training;UE strengthening/ROM;Endurance training;Patient/family training;Equipment evaluation/education;Compensatory technique education;Energy conservation;Activityengagement;Cognitive reorientation   Goal Expiration Date 02/15/24   OT Treatment Day 2   OT Frequency 3-5x/wk   Discharge Recommendation   Rehab Resource Intensity Level, OT II (Moderate Resource Intensity)   Additional Comments  Co treatment with PT completed secondary to complex medical condition of pt, Min A of 2 required to achieve and maintain transitional movements, requiring the need of skilled therapeutic intervention of 2 therapists to achieve delivery of services.   Additional Comments 2 The patient's raw score on the AM-PAC Daily Activity Inpatient Short Form is 13. A raw score of less than 19 suggests the patient may benefit from discharge to post-acute rehabilitation services. Please refer to the recommendation of the Occupational Therapist for safe discharge planning.   AM-PAC Daily Activity  Inpatient   Lower Body Dressing 1   Bathing 2   Toileting 1   Upper Body Dressing 3   Grooming 3   Eating 3   Daily Activity Raw Score 13   Daily Activity Standardized Score (Calc for Raw Score >=11) 32.03   AM-PAC Applied Cognition Inpatient   Following a Speech/Presentation 2   Understanding Ordinary Conversation 2   Taking Medications 1   Remembering Where Things Are Placed or Put Away 1   Remembering List of 4-5 Errands 1   Taking Care of Complicated Tasks 1   Applied Cognition Raw Score 8   Applied Cognition Standardized Score 19.32     Abdulkadir Velasquez MS, OTR/L

## 2024-02-09 NOTE — TELEMEDICINE
TeleConsultation - Behavioral Health   Jairon Oconnell 58 y.o. male MRN: [id number]  Unit/Bed#: -01 Encounter: [id number]        REQUIRED DOCUMENTATION:     1. This service was provided via Telemedicine.  2. [place] at [place]  3. TeleMed provider: Venu King MD.  4. Identify all parties in room with patient during tele consult:  pt  5.Patient was then informed that this was a Telemedicine visit and that the exam was being conducted confidentially over secure lines. My office door was closed. No one else was in the room.  Patient acknowledged consent and understanding of privacy and security of the Telemedicine visit, and gave us permission to have the assistant stay in the room in order to assist with the history and to conduct the exam.  I informed the patient that I have reviewed their record in Epic and presented the opportunity for them to ask any questions regarding the visit today.  The patient agreed to participate.       Assessment/Plan     Present on Admission:   Generalized convulsive epilepsy (HCC)   Type 2 diabetes mellitus with diabetic neuropathy, without long-term current use of insulin (HCC)   Cerebral palsy (HCC)   Acquired hypothyroidism   Essential hypertension   Behavior concern in adult   Hyperlipidemia    Assessment:    58-year-old male with cerebral palsy with history of behavioral concerns and adults.    Treatment Plan:    There are no psychiatric contraindications for SNF placement at this time.  No psychiatric medication changes are recommended as he appears to be stable on the current regimen without adverse side effect.  No psychiatric precautions are indicated.    Current Medications:     Current Facility-Administered Medications   Medication Dose Route Frequency Provider Last Rate    acetaminophen  650 mg Oral Q6H PRN HUNTER Main      benztropine  0.5 mg Oral BID HUNTER Main      calcium carbonate  1 tablet Oral TID HUNTER Main       cholecalciferol  1,000 Units Oral Daily Ashleyrin Good, CRNP      vitamin B-12  1,000 mcg Oral Every Other Day Ashleyrin Good, CRNP      Diclofenac Sodium  2 g Topical 4x Daily Jose Angel Carpenter PA-C      divalproex sodium  1,000 mg Oral HS Ashleyrin Laureanoust, CRNP      divalproex sodium  750 mg Oral Daily Ashleyrin Laureanoust, CRNP      docusate sodium  100 mg Oral BID Ashleyrin Laureanoust, CRNP      gabapentin  300 mg Oral TID Ashleyrin Laureanoust, CRNP      heparin (porcine)  5,000 Units Subcutaneous Q8H RONALD Ashley Laureanoust, CRNP      hydrOXYzine HCL  25 mg Oral BID Ashleyrin Laureanoust, CRNP      insulin lispro  1-5 Units Subcutaneous TID AC Ashleyrin Laureanoust, CRNP      insulin lispro  1-5 Units Subcutaneous HS Ashleyrin Laureanoust, CRNP      lacosamide  150 mg Oral Daily Ashleyrin Laureanoust, CRNP      lacosamide  200 mg Oral HS Ashleyrin Laureanoust, CRNP      levETIRAcetam  1,500 mg Oral Q12H [facility] Ashley Good, CRNP      levothyroxine  150 mcg Oral Early Morning Ashleyrin Laureanoust, CRNP      lisinopril  10 mg Oral Daily Ashleyrin Laureanoust, CRNP      loratadine  10 mg Oral Daily Ashleyrin Laureanoust, CRNP      multivitamin stress formula  1 tablet Oral Daily Ashleyrin Good, CRNP      pravastatin  80 mg Oral Daily With Dinner Ashleyrin Good, CRNP      QUEtiapine  200 mg Oral BID Ashleyrin Laureanoust, CRNP      QUEtiapine  400 mg Oral HS Ashley M Florentino, CRNP      temazepam  15 mg Oral HS PRN Ashley M Florentino, CRNP      zonisamide  300 mg Oral HS [facility] CR[facility]         Risks / Benefits of Treatment:    Risks, benefits, and possible side effects of medications explained to patient and patient verbalizes understanding.      Other treatment modalities recommended as indicated:    No psychiatric contraindication for SNF placement      Inpatient consult to Psychiatry  Consult performed by: Venu King MD  Consult ordered by: Giuliana Shetty MD        Physician Requesting Consult: Giuliana Shetty MD  Principal Problem:Nondisplaced fracture  of greater trochanter of right femur, initial encounter for closed fracture ([facility])    Reason for Consult:  Psychiatric evaluation needed for clearance for SNF placement      History of Present Illness      Patient is a 58 y.o. male for whom psychiatric evaluation is requested for clearance for SNF placement.  The following is documented in the H&P:  Jairon Oconnell is a 58 y.o. male with a PMH of AAA, COVID-19 infection, cerebral palsy, depression, non-insulin-dependent type 2 diabetes, acquired hypothyroidism, essential hypertension, CKD, pressure injury, psychiatric disorder, seizure disorder, sepsis, and urinary tract infection who presents with fall and right greater trochanter fracture.  His caregiver was getting him ready for [employer] when he stumbled hit a dresser and then fell onto the floor.  CT of right lower extremity performed in the ER revealed right greater trochanter comminuted nondisplaced fracture.  He lives in a group home and has a 24-hour caregiver.  He is not able to return to the group home at this time.  He is admitted to medicine.  Unable to obtain full review of systems from patient due to developmental delay/minimally verbal status.  Additional history was obtained from the caregiver at bedside.      Review of Systems:  Review of Systems   Unable to perform ROS: Patient nonverbal         Past Medical and Surgical History:   Medical History        Past Medical History:   Diagnosis Date    ADRIANA (acute kidney injury) ([facility]) 9/24/2019    Bacteremia due to Gram-positive bacteria 9/7/2021    Buttock wound, left, subsequent encounter 11/5/2021    COVID-19      CP (cerebral palsy) ([facility])      Depression      Diabetes ([facility])      Disease of thyroid gland      Gait disorder      Gluteal abscess 9/6/2021    Hyperlipidemia      Hypertension      Kidney failure      Kidney stones      Moderate protein-calorie malnutrition ([facility]) 12/22/2021    Osteoporosis      Pressure injury of left buttock, stage 4 ([facility])  3/9/2022    Psychiatric disorder      Scoliosis      Seizures (HCC)      Sepsis (HCC) 9/25/2019    Thyroid disease      UTI (urinary tract infection)              Surgical History         Past Surgical History:   Procedure Laterality Date    APPENDECTOMY        IR PICC PLACEMENT SINGLE LUMEN   9/13/2021    IR PICC PLACEMENT SINGLE LUMEN   9/16/2021            Meds/Allergies:          Prior to Admission medications    Medication Sig Start Date End Date Taking? Authorizing Provider   benztropine (COGENTIN) 0.5 mg tablet Take 1 tablet (0.5 mg total) by mouth 2 (two) times a day 1/30/24     HUNTER Brown   Blood Glucose Monitoring Suppl (ONETOUCH VERIO) w/Device KIT by Does not apply route 3 (three) times a day TEST BLOOD SUGARS THREE TIMES  Patient not taking: Reported on 7/7/2023 7/31/20     HUNTER Fitzgerald   calcium carbonate (OYSTER SHELL,OSCAL) 500 mg Take 1 tablet by mouth 3 (three) times a day 1/30/24     HUNTER Brown   cholecalciferol (VITAMIN D3) 1,000 units tablet Take 1 tablet (1,000 Units total) by mouth daily 1/30/24     HUNTER Brown   clonazePAM (KlonoPIN) 0.25 MG disintegrating tablet Take 1 tablet (0.25 mg total) by mouth as needed for seizures (clusters of myoclonic jerking (more than 2 myoclonic jerks in a day)) for up to 1 dose 5/19/23     Keyur Banerjee MD   Depakote 500 MG DR tablet Take 1 tab in the morning and 2 tabs at night. Brand necessary 1/24/24     Keyur Banerjee MD   Depakote  MG 24 hr tablet Take 1 tablet (250 mg total) by mouth daily Take in the morning with one 500 mg tablet (total morning dose of 750 mg). BRAND ONLY 1/24/24     Keyur Banerjee MD   Disposable Gloves (Safe-Sense Glove-Blk-Nitrl-L) MISC Use 1 each 3 (three) times a day as needed (care taker assisting with soiled briefs)  Patient not taking: Reported on 9/28/2023 7/26/23     HUNTER Brown   docusate sodium (COLACE) 100 mg capsule Take 1 capsule (100 mg total) by mouth 2 (two)  times a day as needed for constipation 1/30/24     HUTNER Brown   gabapentin (NEURONTIN) 300 mg capsule Take 300 mg by mouth 3 (three) times a day 3/30/22     Historical Provider, MD   hydrOXYzine pamoate (VISTARIL) 25 mg capsule Give 1 capsule PO in the AM and around 2PM for anxiety 7/26/23     HUNTER Brown   ibuprofen (MOTRIN) 600 mg tablet Take 600 mg by mouth every 6 (six) hours as needed for mild pain       Historical Provider, MD   Incontinence Supply Disposable (Briefs Overnight Medium) MISC Use in the morning  Patient not taking: Reported on 9/28/2023 7/26/23     HUNTER Brown   lacosamide (VIMPAT) 150 mg tablet Take 1 tab (150 mg) in the morning. 1/24/24     Live Banerjee MD   lacosamide (VIMPAT) 200 mg tablet Take 1 tab (200 mg) at night. 1/24/24     Live Banerjee MD   levETIRAcetam (KEPPRA) 750 mg tablet GIVE 2 TABLETS BY MOUTH TWICE DAILY (=1500MG) FOR SEIZURE DISORDER DR. LIVE BANERJEE 1/24/24     Live Banerjee MD   levothyroxine 150 mcg tablet Take 1 tablet (150 mcg total) by mouth daily 1/30/24     HUNTER Brown   lisinopril (ZESTRIL) 10 mg tablet Take 1 tablet (10 mg total) by mouth daily 1/30/24     HUNTER Brown   loratadine (CLARITIN) 10 mg tablet Take 10 mg by mouth daily       Historical Provider, MD   metFORMIN (GLUCOPHAGE) 1000 MG tablet Take 1 tablet (1,000 mg total) by mouth 2 (two) times a day with meals 1/30/24     HUNTER Brown   Multiple Vitamin (Daily-Reina) TABS Take 1 tablet by mouth daily Take at 8 AM 4/3/23     HUNTER Brown   neomycin-bacitracin-polymyxin (NEOSPORIN) 5-400-5,000 ointment Apply topically 3 (three) times a day 2/2/24     HUNTER Brownuch Delica Lancets 33G MISC by Does not apply route daily TEST BLOOD SUGARS THREE TIMES DAILY  Patient not taking: Reported on 1/24/2024 7/31/20     HUNTER Fitzgerald   QUEtiapine (SEROquel XR) 200 mg 24 hr tablet TAKE 1 TABLET BY MOUTH TWICE A DAY (8AM,2PM)  7/26/23     HUNTER Brown   QUEtiapine (SEROquel XR) 400 mg 24 hr tablet TAKE 1 TABLET BY MOUTH AT BEDTIME (8PM) (DX: ANTIPSYCHOTIC) 3/28/22     HUNTER Brown   simvastatin (ZOCOR) 40 mg tablet Take 1 tablet (40 mg total) by mouth daily at bedtime 1/30/24     HUNTER Brown   temazepam (RESTORIL) 15 mg capsule Take 1 capsule (15 mg total) by mouth daily at bedtime as needed for sleep 3/28/22     HUNTER Brown   vitamin B-12 (VITAMIN B-12) 1,000 mcg tablet Take 1 tablet (1,000 mcg total) by mouth daily  Patient not taking: Reported on 1/5/2024 7/26/23     HUNTER Brown   zonisamide (ZONEGRAN) 100 mg capsule Take 4 capsules (400 mg) nightly.  Patient taking differently: No sig reported 11/17/23     Keyur Banerjee MD      I have reviewed home medications with a medical source (PCP, Pharmacy, other).     Allergies:         Allergies   Allergen Reactions    Depakote [Valproic Acid] Other (See Comments)       Must have brand name Depakote    Erythromycin Other (See Comments)       Unknown per pt    Penicillins Other (See Comments)       Unknown per pt         Social History:  Marital Status: Single   Occupation: Disabled  Patient Pre-hospital Living Situation: Home  Patient Pre-hospital Level of Mobility: walks with person assist  Patient Pre-hospital Diet Restrictions: Mechanical soft carb controlled  Substance Use History:   Social History          Substance and Sexual Activity   Alcohol Use Never      Social History          Tobacco Use   Smoking Status Never   Smokeless Tobacco Never      Social History          Substance and Sexual Activity   Drug Use No         Family History:  Family History[]Expand by Default   No family history on file.     Nursing reports that the patient has been pleasant and cooperative with care and without behavioral disturbance.  He has been oriented to person.    In meeting with the patient he is alert and sitting up in bed.  He made good eye contact.   Affect is constricted.  No apparent distress was seen.  He responded to questions with yes or no.  He was oriented to person.  He served as poor historian.  He indicated that his mood is doing well.  He denies depression.  He denies suicidal ideation/homicidal ideation.  He denies hallucinations and other psychotic features.  Cognition, insight and judgment appear impaired.    [employer] suicide severity risk scale: The patient denies history of death wishes or suicidal ideation.  No suicide risk identified.    Past Medical History:   Diagnosis Date    ADRIANA (acute kidney injury) ([facility]) 9/24/2019    Bacteremia due to Gram-positive bacteria 9/7/2021    Buttock wound, left, subsequent encounter 11/5/2021    COVID-19     CP (cerebral palsy) ([facility])     Depression     Diabetes ([facility])     Disease of thyroid gland     Gait disorder     Gluteal abscess 9/6/2021    Hyperlipidemia     Hypertension     Kidney failure     Kidney stones     Moderate protein-calorie malnutrition ([facility]) 12/22/2021    Osteoporosis     Pressure injury of left buttock, stage 4 ([facility]) 3/9/2022    Psychiatric disorder     Scoliosis     Seizures ([facility])     Sepsis ([facility]) 9/25/2019    Thyroid disease     UTI (urinary tract infection)        Medical Review Of Systems:    Review of Systems    Meds/Allergies     all current active meds have been reviewed  Allergies   Allergen Reactions    Depakote [Valproic Acid] Other (See Comments)     Must have brand name Depakote    Erythromycin Other (See Comments)     Unknown per pt    Penicillins Other (See Comments)     Unknown per pt       Objective     Vital signs in last 24 hours:  Temp:  [96.7 °F (35.9 °C)-98 °F (36.7 °C)] 97.6 °F (36.4 °C)  HR:  [75-84] 81  Resp:  [18-19] 19  BP: (126-150)/(71-78) 150/75      Intake/Output Summary (Last 24 hours) at 2/9/2024 1024  Last data filed at 2/9/2024 0937  Gross per 24 hour   Intake 480 ml   Output 1100 ml   Net -620 ml       Lab Results: I have personally reviewed all pertinent  laboratory/tests results.         Imaging Studies: XR knee 4+ vw right injury    Result Date: 2/5/2024  Narrative: XR KNEE 4+ VW RIGHT INJURY INDICATION: right knee pain COMPARISON: 11/23/2021 FINDINGS: No acute fracture or dislocation. No joint effusion. No significant degenerative changes. No lytic or blastic osseous lesion. Unremarkable soft tissues.     Impression: No acute osseous abnormality. Workstation performed: [id number]     XR hip/pelv 2-3 vws right if performed    Result Date: 2/5/2024  Narrative: XR HIP/PELV 2-3 VWS RIGHT W PELVIS IF PERFORMED INDICATION: left hip pain COMPARISON: 9/5/2021 FINDINGS: There is a fracture of the greater trochanter without significant displacement. Bilateral femoral head AVN. Mild subchondral collapse of the right femoral head. No lytic or blastic osseous lesion. Unremarkable soft tissues. Unremarkable visualized lumbar spine.     Impression: Nondisplaced greater trochanteric fracture. Mild collapse of the right femoral head. Bilateral femoral head AVN. Workstation performed: [id number]     CT lower extremity wo contrast right    Result Date: 2/4/2024  Narrative: CT right hip without IV contrast INDICATION: evaluate for right Hip fracture. COMPARISON: None. TECHNIQUE: CT examination of the above was performed. This examination, like all CT scans performed in the [facility], was performed utilizing techniques to minimize radiation dose exposure, including the use of iterative reconstruction and automated exposure control software.  Multiplanar 2D reformatted images were created from the source data. Rad dose  378 mGy-cm FINDINGS: OSSEOUS STRUCTURES: There is a area of avascular necrosis in the right femoral head with associated subchondral lucency due to subchondral fracture there is mild flattening of the femoral head There is a nondisplaced fracture from the right greater trochanter with associated mild comminution. There is no femoral neck fracture. There  is mild right hip arthritis Lesser trochanter is intact Calcification seen in relation to the greater trochanter image 94 series 601, chronic Small hip joint effusion seen VISUALIZED MUSCULATURE: No intramuscular hematoma SOFT TISSUES: Distended urinary bladder OTHER PERTINENT FINDINGS:  None.     Impression: There is mildly comminuted and nondisplaced fractures of the right greater trochanter. No evidence of femoral neck fracture Avascular necrosis right femoral head with a subchondral fracture and mild flattening of the femoral head due to evolving subchondral collapse The study was marked in EPIC for immediate notification. Workstation performed: [id number]     CT head without contrast    Result Date: 1/26/2024  Narrative: CT BRAIN - WITHOUT CONTRAST INDICATION:   Head strike, cognitively impaired. COMPARISON: CT 3/28/2022. TECHNIQUE:  CT examination of the brain was performed.  Multiplanar 2D reformatted images were created from the source data. Radiation dose length product (DLP) for this visit:  1036.53 mGy-cm .  This examination, like all CT scans performed in the [facility], was performed utilizing techniques to minimize radiation dose exposure, including the use of iterative reconstruction and automated exposure control. IMAGE QUALITY:  Diagnostic. FINDINGS: PARENCHYMA:  No intracranial mass, mass effect or midline shift. No CT signs of acute infarction.  No acute parenchymal hemorrhage. VENTRICLES AND EXTRA-AXIAL SPACES:  Enlargement of ventricles and extra-axial CSF spaces, out of proportion to the patient's age most consistent with cerebral and cerebellar atrophy. This is similar to the prior study. VISUALIZED ORBITS: Normal visualized orbits. PARANASAL SINUSES: Normal visualized paranasal sinuses. CALVARIUM AND EXTRACRANIAL SOFT TISSUES:  Normal.     Impression: No acute intracranial abnormality. Workstation performed: [id number]     EKG/Pathology/Other Studies:   Lab Results   Component  Value Date    VENTRATE 74 05/16/2023    ATRIALRATE 74 05/16/2023    PRINT 186 05/16/2023    QRSDINT 170 05/16/2023    QTINT 442 05/16/2023    QTCINT 490 05/16/2023    PAXIS 71 05/16/2023    QRSAXIS 150 05/16/2023    TWAVEAXIS 30 05/16/2023        Code Status: Level 1 - Full Code  Advance Directive and Living Will:      Power of :    POLST:      Screenings:    1. Nutrition Screening  Not available on chart    2. Pain Screening  Not obtained    3. Suicide Screening  Not available on chart    Counseling / Coordination of Care:    Total floor / unit time spent today 30 minutes. Greater than 50% of total time was spent with the patient and / or family counseling and / or coordination of care. A description of the counseling / coordination of care: Chart review, patient evaluation, coordination communication with staff, nursing and provider.

## 2024-02-09 NOTE — PLAN OF CARE
Problem: PHYSICAL THERAPY ADULT  Goal: Performs mobility at highest level of function for planned discharge setting.  See evaluation for individualized goals.  Description: Treatment/Interventions: Functional transfer training, LE strengthening/ROM, Therapeutic exercise, Endurance training, Cognitive reorientation, Patient/family training, Equipment eval/education, Bed mobility, Gait training, Spoke to nursing, Spoke to case management, OT  Equipment Recommended:  (continue TBD pending progress, RW at this time)       See flowsheet documentation for full assessment, interventions and recommendations.  Outcome: Progressing  Note: Prognosis: Fair  Problem List: Decreased strength, Decreased endurance, Impaired balance, Decreased mobility, Decreased coordination, Decreased cognition, Impaired judgement, Decreased safety awareness, Orthopedic restrictions, Pain  Assessment: Pt seen for PT treatment session this date, consisting of ther act focused on bed mobility & transfer training, ther ex focused on LB strengthening in order to facilitate improved activity tolerance and ambulation ability, and gt training on level surfaces to improve pt safety in household environment. Since previous session, pt has made good progress in terms of overall less physical assistance required. Pertinent barriers during this session include cognitive status. Current goals and POC established on IE remain appropriate, pt continues to have rehab potential and is making fair progress towards STGs. Pt prognosis for achieving goals is fair, pending pt progress with hospitalization/medical status improvements, and indicated by attention span. Pt limited d/t inability to concentrate under maximum structure and decreased cognition . Pt continues to be functioning below baseline level, and remains limited 2* factors listed above. PT will continue to see pt during current hospitalization in order to address the deficits listed above and provide  interventions consistent w/ POC in effort to achieve STGs. PT recommends level 2, moderate resource intensity upon discharge.  Barriers to Discharge: Inaccessible home environment     Rehab Resource Intensity Level, PT: II (Moderate Resource Intensity)    See flowsheet documentation for full assessment.

## 2024-02-09 NOTE — PLAN OF CARE
Problem: Potential for Falls  Goal: Patient will remain free of falls  Description: INTERVENTIONS:  - Educate patient/family on patient safety including physical limitations  - Instruct patient to call for assistance with activity   - Consult OT/PT to assist with strengthening/mobility   - Keep Call bell within reach  - Keep bed low and locked with side rails adjusted as appropriate  - Keep care items and personal belongings within reach  - Initiate and maintain comfort rounds  - Make Fall Risk Sign visible to staff  - Offer Toileting every 2 Hours, in advance of need  - Initiate/Maintain alarms  - Obtain necessary fall risk management equipment:   - Apply yellow socks and bracelet for high fall risk patients  - Consider moving patient to room near nurses station  Outcome: Progressing     Problem: Prexisting or High Potential for Compromised Skin Integrity  Goal: Skin integrity is maintained or improved  Description: INTERVENTIONS:  - Identify patients at risk for skin breakdown  - Assess and monitor skin integrity  - Assess and monitor nutrition and hydration status  - Monitor labs   - Assess for incontinence   - Turn and reposition patient  - Assist with mobility/ambulation  - Relieve pressure over bony prominences  - Avoid friction and shearing  - Provide appropriate hygiene as needed including keeping skin clean and dry  - Evaluate need for skin moisturizer/barrier cream  - Collaborate with interdisciplinary team   - Patient/family teaching  - Consider wound care consult   Outcome: Progressing     Problem: PAIN - ADULT  Goal: Verbalizes/displays adequate comfort level or baseline comfort level  Description: Interventions:  - Encourage patient to monitor pain and request assistance  - Assess pain using appropriate pain scale  - Administer analgesics based on type and severity of pain and evaluate response  - Implement non-pharmacological measures as appropriate and evaluate response  - Consider cultural and  social influences on pain and pain management  - Notify physician/advanced practitioner if interventions unsuccessful or patient reports new pain  Outcome: Progressing     Problem: INFECTION - ADULT  Goal: Absence or prevention of progression during hospitalization  Description: INTERVENTIONS:  - Assess and monitor for signs and symptoms of infection  - Monitor lab/diagnostic results  - Monitor all insertion sites, i.e. indwelling lines, tubes, and drains  - Monitor endotracheal if appropriate and nasal secretions for changes in amount and color  - [place] appropriate cooling/warming therapies per order  - Administer medications as ordered  - Instruct and encourage patient and family to use good hand hygiene technique  - Identify and instruct in appropriate isolation precautions for identified infection/condition  Outcome: Progressing  Goal: Absence of fever/infection during neutropenic period  Description: INTERVENTIONS:  - Monitor WBC    Outcome: Progressing     Problem: SAFETY ADULT  Goal: Maintain or return to baseline ADL function  Description: INTERVENTIONS:  -  Assess patient's ability to carry out ADLs; assess patient's baseline for ADL function and identify physical deficits which impact ability to perform ADLs (bathing, care of mouth/teeth, toileting, grooming, dressing, etc.)  - Assess/evaluate cause of self-care deficits   - Assess range of motion  - Assess patient's mobility; develop plan if impaired  - Assess patient's need for assistive devices and provide as appropriate  - Encourage maximum independence but intervene and supervise when necessary  - Involve family in performance of ADLs  - Assess for home care needs following discharge   - Consider OT consult to assist with ADL evaluation and planning for discharge  - Provide patient education as appropriate  Outcome: Progressing  Goal: Maintains/Returns to pre admission functional level  Description: INTERVENTIONS:  - Perform AM-PAC 6 Click Basic  Mobility/ Daily Activity assessment daily.  - Set and communicate daily mobility goal to care team and patient/family/caregiver.   - Collaborate with rehabilitation services on mobility goals if consulted  - Reposition patient every 2 hours.  - Out of bed for toileting/meals  - Record patient progress and toleration of activity level   Outcome: Progressing     Problem: DISCHARGE PLANNING  Goal: Discharge to home or other facility with appropriate resources  Description: INTERVENTIONS:  - Identify barriers to discharge w/patient and caregiver  - Arrange for needed discharge resources and transportation as appropriate  - Identify discharge learning needs (meds, wound care, etc.)  - Refer to Case Management Department for coordinating discharge planning if the patient needs post-hospital services based on physician/advanced practitioner order or complex needs related to functional status, cognitive ability, or social support system  Outcome: Progressing     Problem: Knowledge Deficit  Goal: Patient/family/caregiver demonstrates understanding of disease process, treatment plan, medications, and discharge instructions  Description: Complete learning assessment and assess knowledge base.  Interventions:  - Provide teaching at level of understanding  - Provide teaching via preferred learning methods  Outcome: Progressing

## 2024-02-09 NOTE — ASSESSMENT & PLAN NOTE
Status post a mechanical fall prior to arrival   CT right lower extremity imaging confirmed a comminuted and nondisplaced fractures of the right greater trochanter with no evidence of femoral neck fracture.   Status post an orthopedic surgical evaluation  No indication at this time for an acute phase inpatient surgical intervention  Okay for weightbearing as tolerated  Continue current pain medication management regimen  Status post a PT and OT evaluation, they recommend postacute rehabilitation services  Update on 2/9/2024 -patient remains medically stable for discharge  Notified by case management that the patient will need a psychiatric evaluation prior to being transitioned to a skilled nursing facility for rehab, will consult psych for clearance

## 2024-02-09 NOTE — PROGRESS NOTES
[facility]  Progress Note  Name: Jairon Oconnell I  MRN: [id number]  Unit/Bed#: -01 I Date of Admission: 2/4/2024   Date of Service: 2/9/2024 I Hospital Day: 5    Assessment/Plan   * Nondisplaced fracture of greater trochanter of right femur, initial encounter for closed fracture (HCC)  Assessment & Plan  Status post a mechanical fall prior to arrival   CT right lower extremity imaging confirmed a comminuted and nondisplaced fractures of the right greater trochanter with no evidence of femoral neck fracture.   Status post an orthopedic surgical evaluation  No indication at this time for an acute phase inpatient surgical intervention  Okay for weightbearing as tolerated  Continue current pain medication management regimen  Status post a PT and OT evaluation, they recommend postacute rehabilitation services  Update on 2/9/2024 -patient remains medically stable for discharge  Notified by case management that the patient will need a psychiatric evaluation prior to being transitioned to a skilled nursing facility for rehab, will consult psych for clearance    Cerebral palsy (HCC)  Assessment & Plan  He lives in a group home and has caregiver  Continue supportive care    Essential hypertension  Assessment & Plan  Blood pressure stable  Continue lisinopril    Generalized convulsive epilepsy (HCC)  Assessment & Plan  Currently controlled  Continue Depakote 750 mg in the morning and at 1000 mg at bedtime, Vimpat 150 mg in the morning and 200 mg at bedtime, Keppra 750 mg BID, and zonisamide 300 mg at bedtime      Acquired hypothyroidism  Assessment & Plan  Continue levothyroxine 150 mcg daily    Type 2 diabetes mellitus with diabetic neuropathy, without long-term current use of insulin (HCC)  Assessment & Plan  Lab Results   Component Value Date    HGBA1C 5.5 11/27/2023       Recent Labs     02/08/24  1058 02/08/24  1616 02/08/24  2048 02/09/24  0649   POCGLU 104 143* 186* 106         Blood Sugar  Average: Last 72 hrs:  (P) [id number]  Oral hypoglycemic medications remain on hold  Target blood sugar for the hospital is 140-180  Continue Accu-Cheks before meals and at bedtime with sliding scale insulin coverage  Diabetic neuropathy-continue gabapentin    Hyperlipidemia  Assessment & Plan  Continue pravastatin    Behavior concern in adult  Assessment & Plan  With impulsive behavior at times  Continue Seroquel 200 mg at 8 AM and 2 PM and 400 mg at bedtime  Continue hydroxyzine 25 mg twice daily  Continue benztropine 0.5 mg twice daily  Supportive care               VTE Pharmacologic Prophylaxis: VTE Score: 6 High Risk (Score >/= 5) - Pharmacological DVT Prophylaxis Ordered: heparin. Sequential Compression Devices Ordered.    Mobility:   Basic Mobility Inpatient Raw Score: 11  JH-HLM Goal: 4: Move to chair/commode  JH-HLM Achieved: 4: Move to chair/commode  HLM Goal NOT achieved. Continue with multidisciplinary rounding and encourage appropriate mobility to improve upon HLM goals.    Patient Centered Rounds: I performed bedside rounds with nursing staff today.   Discussions with Specialists or Other Care Team Provider: Orthopedic surgeon    Education and Discussions with Family / Patient: Updated  (sister) via phone.    Total Time Spent on Date of Encounter in care of patient: 35 mins. This time was spent on one or more of the following: performing physical exam; counseling and coordination of care; obtaining or reviewing history; documenting in the medical record; reviewing/ordering tests, medications or procedures; communicating with other healthcare professionals and discussing with patient's family/caregivers.    Current Length of Stay: 5 day(s)  Current Patient Status: Inpatient   Certification Statement: The patient will continue to require additional inpatient hospital stay due to need for psych evaluation, and ongoing placement attempts  Discharge Plan:  Potential discharge  planning for the week of 2024 as per case management    Code Status: Level 1 - Full Code    Subjective:   Patient seen, resting in bed, appears comfortable, remains at baseline from a mentation standpoint in the setting of having cerebral palsy.    Objective:     Vitals:   Temp (24hrs), Av.4 °F (36.3 °C), Min:96.7 °F (35.9 °C), Max:98 °F (36.7 °C)    Temp:  [96.7 °F (35.9 °C)-98 °F (36.7 °C)] 97.6 °F (36.4 °C)  HR:  [75-84] 75  Resp:  [18-19] 19  BP: (126-145)/(71-78) 126/71  SpO2:  [94 %-95 %] 94 %  Body mass index is 21.14 kg/m².     Input and Output Summary (last 24 hours):     Intake/Output Summary (Last 24 hours) at 2024 0850  Last data filed at 2024 0519  Gross per 24 hour   Intake 480 ml   Output 850 ml   Net -370 ml       Physical Exam:   Physical Exam  Vitals and nursing note reviewed.   Constitutional:       General: He is not in acute distress.     Appearance: Normal appearance. He is not ill-appearing.   HENT:      Head: Normocephalic and atraumatic.      Nose: Nose normal.   Eyes:      Extraocular Movements: Extraocular movements intact.      Pupils: Pupils are equal, round, and reactive to light.   Cardiovascular:      Rate and Rhythm: Normal rate and regular rhythm.      Pulses: Normal pulses.      Heart sounds: Normal heart sounds. No murmur heard.     No friction rub. No gallop.   Pulmonary:      Effort: Pulmonary effort is normal.      Breath sounds: Normal breath sounds.   Abdominal:      General: There is no distension.      Palpations: Abdomen is soft. There is no mass.      Tenderness: There is no abdominal tenderness. There is no guarding or rebound.   Musculoskeletal:         General: No swelling or tenderness. Normal range of motion.      Cervical back: Normal range of motion and neck supple. No rigidity. No muscular tenderness.      Right lower leg: No edema.      Left lower leg: No edema.   Skin:     General: Skin is warm.      Capillary Refill: Capillary refill takes less  than 2 seconds.      Findings: No erythema or rash.   Neurological:      General: No focal deficit present.      Mental Status: He is alert. Mental status is at baseline.      Comments: At baseline in the setting of having cerebral palsy   Psychiatric:         Mood and Affect: Mood normal.         Behavior: Behavior normal.          Additional Data:     Labs:  Results from last 7 days   Lab Units 02/07/24  0454   WBC Thousand/uL 6.99   HEMOGLOBIN g/dL 12.7   HEMATOCRIT % 37.7   PLATELETS Thousands/uL 155   NEUTROS PCT % 27*   LYMPHS PCT % 51*   MONOS PCT % 17*   EOS PCT % 4     Results from last 7 days   Lab Units 02/07/24  0454   SODIUM mmol/L 138   POTASSIUM mmol/L 4.1   CHLORIDE mmol/L 99   CO2 mmol/L 29   BUN mg/dL 18   CREATININE mg/dL 0.64   ANION GAP mmol/L 10   CALCIUM mg/dL 9.4   GLUCOSE RANDOM mg/dL 94         Results from last 7 days   Lab Units 02/09/24  0649 02/08/24  2048 02/08/24  1616 02/08/24  1058 02/08/24  0653 02/07/24  2109 02/07/24  1630 02/07/24  1107 02/07/24  0703 02/06/24  2109 02/06/24  1612 02/06/24  1119   POC GLUCOSE mg/dl 106 186* 143* 104 125 179* 215* 129 94 186* 95 143*               Lines/Drains:  Invasive Devices       Peripheral Intravenous Line  Duration             Peripheral IV 02/05/24 Left;Ventral (anterior) Forearm 4 days                          Imaging: No pertinent imaging reviewed.    Recent Cultures (last 7 days):         Last 24 Hours Medication List:   Current Facility-Administered Medications   Medication Dose Route Frequency Provider Last Rate    acetaminophen  650 mg Oral Q6H PRN HUNTER Main      benztropine  0.5 mg Oral BID HUNTER Main      calcium carbonate  1 tablet Oral TID HUNTER Main      cholecalciferol  1,000 Units Oral Daily HUNTER Main      vitamin B-12  1,000 mcg Oral Every Other Day HUNTER Main      Diclofenac Sodium  2 g Topical 4x Daily Jose Angel Carpenter PA-C      divalproex sodium  1,000 mg Oral  HS Ashley M Florentino, CRNP      divalproex sodium  750 mg Oral Daily Ashleyrin Laureanoust, CRNP      docusate sodium  100 mg Oral BID Ashleyrin Laureanoust, CRNP      gabapentin  300 mg Oral TID Ashleyrin Laureanoust, CRNP      heparin (porcine)  5,000 Units Subcutaneous Q8H RONALD Ashley Laureanoust, CRNP      hydrOXYzine HCL  25 mg Oral BID Ashley M Florentino, CRNP      insulin lispro  1-5 Units Subcutaneous TID AC Ashley M Florentino, CRNP      insulin lispro  1-5 Units Subcutaneous HS Ashley M Florentino, CRNP      lacosamide  150 mg Oral Daily Ashleyrin Laureanoust, CRNP      lacosamide  200 mg Oral HS Ashleyrin Laureanoust, CRNP      levETIRAcetam  1,500 mg Oral Q12H RONALD Ashley Laureanoust, CRNP      levothyroxine  150 mcg Oral Early Morning Ashleyrin Laureanoust, CRNP      lisinopril  10 mg Oral Daily Ashleyrin Good, CRNP      loratadine  10 mg Oral Daily Ashleyrin Good, CRNP      multivitamin stress formula  1 tablet Oral Daily Ashleyrin Good, CRNP      pravastatin  80 mg Oral Daily With Dinner Ashleyrin Good, CRNP      QUEtiapine  200 mg Oral BID Ashleyrin Good, CRNP      QUEtiapine  400 mg Oral HS Ashleyrin Laureanoust, CRNP      temazepam  15 mg Oral HS PRN Ashley Laureanoust, CRNP      zonisamide  300 mg Oral HS Ashleyrin Good, CRNP          Today, Patient Was Seen By: Giuliana Shetty MD    **Please Note: This note may have been constructed using a voice recognition system.**

## 2024-02-09 NOTE — TELEPHONE ENCOUNTER
Consult placed on [facility] queue at 0847 to be seen by an [facility] psychiatrist.

## 2024-02-09 NOTE — CASE MANAGEMENT
Case Management Discharge Planning Note    Patient name Jairon Oconnell  Location /-01 MRN [id number]  : 1965 Date 2024       Current Admission Date: 2024  Current Admission Diagnosis:Nondisplaced fracture of greater trochanter of right femur, initial encounter for closed fracture ([facility])   Patient Active Problem List    Diagnosis Date Noted    Nondisplaced fracture of greater trochanter of right femur, initial encounter for closed fracture ([facility]) 2024    Closed nondisplaced fracture of proximal phalanx of left index finger with routine healing, subsequent encounter 10/30/2023    Pressure ulcer of sacral region, stage 3 ([facility]) 2023    Ambulatory dysfunction 2022    Social problem 2021    Hypomagnesemia 2021    Thrombocytopathia ([facility]) 2020    Hyponatremia 2019    Metabolic encephalopathy 2019    Seborrheic dermatitis of scalp 2019    Nearsightedness 2019    Behavior concern in adult 2019    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    Vitamin B12 deficiency 2017    Hyperlipidemia 2016    Vitamin D deficiency 2016    Type 2 diabetes mellitus with diabetic neuropathy, without long-term current use of insulin ([facility]) 06/15/2016    Acquired hypothyroidism 06/15/2016    Cataract 2013    Cerebral palsy ([facility]) 2013    Generalized convulsive epilepsy ([facility]) 2013    Essential hypertension 2013    Osteoporosis 2013    Scoliosis 2013      LOS (days): 5  Geometric Mean LOS (GMLOS) (days): 2.8  Days to GMLOS:-2.4     OBJECTIVE:  Risk of Unplanned Readmission Score: 15.75         Current admission status: Inpatient   Preferred Pharmacy:   [facility] [place] - [address]  [address] [address]  Phone: [phone] Fax: [phone]    Primary Care Provider: HUNTER Brown    Primary Insurance: MEDICARE  Secondary Insurance:  [place] [employer]    DISCHARGE DETAILS:    Discharge planning discussed with:: patient  Freedom of Choice: Yes  Comments - Freedom of Choice: recommendation is for rehab  - referrals were sent wiht permission - option process has been started-                               Other Referral/Resources/Interventions Provided:  Interventions: Short Term Rehab  Referral Comments: referrals sent via maribel- Ev[facility] from [facility] came today to assess the pt- pt needs three determination letters from the state before he can be d/c to a snf for rehab and then return to the group home    Would you like to participate in our [facility] service program?  : No - Declined    Treatment Team Recommendation: Short Term Rehab (f rehab needs determination letter- bls)

## 2024-02-09 NOTE — PHYSICAL THERAPY NOTE
"Physical Therapy Treatment Note       02/09/24 1104   PT Last Visit   PT Visit Date 02/09/24   Note Type   Note Type Treatment   Pain Assessment   Pain Assessment Tool FLACC   Pain Score No Pain   Pain Rating: FLACC (Rest) - Face 0   Pain Rating: FLACC (Rest) - Legs 0   Pain Rating: FLACC (Rest) - Activity 0   Pain Rating: FLACC (Rest) - Cry 0   Pain Rating: FLACC (Rest) - Consolability 0   Score: FLACC (Rest) 0   Pain Rating: FLACC (Activity) - Face 1   Pain Rating: FLACC (Activity) - Legs 1   Pain Rating: FLACC (Activity) - Activity 1   Pain Rating: FLACC (Activity) - Cry 1   Pain Rating: FLACC (Activity) - Consolability 1   Score: FLACC (Activity) 5   Restrictions/Precautions   Weight Bearing Precautions Per Order Yes   RLE Weight Bearing Per Order WBAT  (+ no active hip ABDuction)   Other Precautions Cognitive;Chair Alarm;Bed Alarm;Impulsive;WBS;Fall Risk;Pain   General   Chart Reviewed Yes   Response to Previous Treatment Patient unable to report, no changes reported from family or staff   Family/Caregiver Present Yes   Cognition   Arousal/Participation Responsive;Cooperative   Attention Difficulty attending to directions   Orientation Level Oriented to person   Memory Unable to assess   Following Commands Follows one step commands with increased time or repetition   Comments pt pleasant   Subjective   Subjective \"I can't\"   Bed Mobility   Supine to Sit 4  Minimal assistance   Additional items Assist x 1;HOB elevated;Increased time required   Transfers   Sit to Stand 4  Minimal assistance   Additional items Assist x 2;Armrests;Increased time required   Stand to Sit 4  Minimal assistance   Additional items Assist x 2;Increased time required;Verbal cues   Stand pivot 4  Minimal assistance   Additional items Assist x 2;Increased time required;Verbal cues   Ambulation/Elevation   Gait pattern Improper Weight shift;Narrow GORGE;Decreased foot clearance;Decreased R stance;Step to;Excessively slow   Gait Assistance 4  " Addended by: Pasquale Montelongo on: 4/15/2022 01:02 PM     Modules accepted: Orders Minimal assist   Additional items Assist x 2;Verbal cues;Tactile cues   Assistive Device Rolling walker   Distance 5 ft   Stair Management Assistance Not tested   Balance   Static Sitting Fair +   Dynamic Sitting Fair -   Static Standing Fair -   Dynamic Standing Poor   Ambulatory Poor   Endurance Deficit   Endurance Deficit Yes   Activity Tolerance   Activity Tolerance Patient limited by fatigue;Patient limited by pain;Treatment limited secondary to medical complications (Comment)  (cognition)   Medical Staff Made Aware coordination of care provided with OT Abdulkadir   Nurse Made Aware Yes, RN made aware of outcomes/recs   Exercises   Hip Abduction Sitting;10 reps;AROM;Left   Hip Adduction Sitting;10 reps;AROM;Left   Knee AROM Long Arc Quad Sitting;20 reps;AROM;Right;Left   Marching Sitting;20 reps;AROM;Right;Left   Assessment   Prognosis Fair   Problem List Decreased strength;Decreased endurance;Impaired balance;Decreased mobility;Decreased coordination;Decreased cognition;Impaired judgement;Decreased safety awareness;Orthopedic restrictions;Pain   Assessment Pt seen for PT treatment session this date, consisting of ther act focused on bed mobility & transfer training, ther ex focused on LB strengthening in order to facilitate improved activity tolerance and ambulation ability, and gt training on level surfaces to improve pt safety in household environment. Since previous session, pt has made good progress in terms of overall less physical assistance required. Pertinent barriers during this session include cognitive status. Current goals and POC established on IE remain appropriate, pt continues to have rehab potential and is making fair progress towards STGs. Pt prognosis for achieving goals is fair, pending pt progress with hospitalization/medical status improvements, and indicated by attention span. Pt limited d/t inability to concentrate under maximum structure and decreased cognition . Pt continues to be  functioning below baseline level, and remains limited 2* factors listed above. PT will continue to see pt during current hospitalization in order to address the deficits listed above and provide interventions consistent w/ POC in effort to achieve STGs. PT recommends level 2, moderate resource intensity upon discharge.   Barriers to Discharge Inaccessible home environment   Goals   Patient Goals none expressed by pt d/t baseline cognitive status   STG Expiration Date 02/15/24   Short Term Goal #1 goals remain appropriate   PT Treatment Day 1   Plan   Treatment/Interventions Functional transfer training;LE strengthening/ROM;Therapeutic exercise;Endurance training;Cognitive reorientation;Patient/family training;Equipment eval/education;Bed mobility;Gait training;Spoke to nursing;Spoke to case management;OT   Progress Slow progress, decreased activity tolerance   PT Frequency 3-5x/wk   Discharge Recommendation   Rehab Resource Intensity Level, PT II (Moderate Resource Intensity)   Equipment Recommended   (continue RW use)   Additional Comments Pt's raw score on the -[facility] Basic Mobility inpatient short form is 13, standardized score is 33.99. Patients at this level are likely to benefit from DC to post acute rehabilitation services, however, please refer to therapist recommendation for safe DC planning.   -[facility] Basic Mobility Inpatient   Turning in Flat Bed Without Bedrails 3   Lying on Back to Sitting on Edge of Flat Bed Without Bedrails 3   Moving Bed to Chair 2   Standing Up From Chair Using Arms 2   Walk in Room 2   Climb 3-5 Stairs With Railing 1   Basic Mobility Inpatient Raw Score 13   Basic Mobility Standardized Score 33.99   Highest Level Of Mobility   JH-HLM Goal 4: Move to chair/commode   JH-HLM Achieved 6: Walk 10 steps or more   End of Consult   Patient Position at End of Consult Bedside chair;All needs within reach;Bed/Chair alarm activated       Annabel Dorado, PT, DPT    Time of PT treatment session:  7936-5084 (total treatment time = 26 minutes)

## 2024-02-09 NOTE — PLAN OF CARE
Problem: OCCUPATIONAL THERAPY ADULT  Goal: Performs self-care activities at highest level of function for planned discharge setting.  See evaluation for individualized goals.  Description: Treatment Interventions: ADL retraining, Functional transfer training, UE strengthening/ROM, Endurance training, Patient/family training, Equipment evaluation/education, Compensatory technique education, Energy conservation, Activityengagement, Cognitive reorientation    See flowsheet documentation for full assessment, interventions and recommendations.   Outcome: Progressing  Note: Limitation: Decreased ADL status, Decreased UE strength, Decreased Safe judgement during ADL, Decreased endurance, Decreased self-care trans, Decreased high-level ADLs, Decreased cognition  Prognosis: Fair  Assessment: Patient participated in Skilled OT session this date with interventions consisting of therapeutic exercise to: increase functional use of BUEs, increase BUE muscle strength  and  therapeutic activities to: increase activity tolerance. Patient agreeable to OT treatment session, upon arrival patient was found supine in bed.  In comparison to previous session, patient with improvements in functional transfers . Patient requiring verbal cues for safety, verbal cues for correct technique, and frequent rest periods. Patient continues to be functioning below baseline level, occupational performance remains limited secondary to factors listed above and increased risk for falls and injury. From OT standpoint, recommendation at time of d/c would be Level II (Moderate Resource Intensity. Patient to benefit from continued Occupational Therapy treatment while in the hospital to address deficits as defined above and maximize level of functional independence with ADLs and functional mobility.     Rehab Resource Intensity Level, OT: II (Moderate Resource Intensity)     Abdulkadir Velasquez MS, OTR/L

## 2024-02-10 LAB
ANION GAP SERPL CALCULATED.3IONS-SCNC: 7 MMOL/L
BASOPHILS # BLD AUTO: 0.06 THOUSANDS/ÂΜL (ref 0–0.1)
BASOPHILS NFR BLD AUTO: 1 % (ref 0–1)
BUN SERPL-MCNC: 20 MG/DL (ref 5–25)
CALCIUM SERPL-MCNC: 9.2 MG/DL (ref 8.4–10.2)
CHLORIDE SERPL-SCNC: 104 MMOL/L (ref 96–108)
CO2 SERPL-SCNC: 28 MMOL/L (ref 21–32)
CREAT SERPL-MCNC: 0.71 MG/DL (ref 0.6–1.3)
EOSINOPHIL # BLD AUTO: 0.33 THOUSAND/ÂΜL (ref 0–0.61)
EOSINOPHIL NFR BLD AUTO: 5 % (ref 0–6)
ERYTHROCYTE [DISTWIDTH] IN BLOOD BY AUTOMATED COUNT: 13.3 % (ref 11.6–15.1)
GFR SERPL CREATININE-BSD FRML MDRD: 104 ML/MIN/1.73SQ M
GLUCOSE SERPL-MCNC: 107 MG/DL (ref 65–140)
GLUCOSE SERPL-MCNC: 119 MG/DL (ref 65–140)
GLUCOSE SERPL-MCNC: 139 MG/DL (ref 65–140)
GLUCOSE SERPL-MCNC: 193 MG/DL (ref 65–140)
GLUCOSE SERPL-MCNC: 96 MG/DL (ref 65–140)
HCT VFR BLD AUTO: 34.8 % (ref 36.5–49.3)
HGB BLD-MCNC: 11.6 G/DL (ref 12–17)
IMM GRANULOCYTES # BLD AUTO: 0.05 THOUSAND/UL (ref 0–0.2)
IMM GRANULOCYTES NFR BLD AUTO: 1 % (ref 0–2)
LYMPHOCYTES # BLD AUTO: 3.5 THOUSANDS/ÂΜL (ref 0.6–4.47)
LYMPHOCYTES NFR BLD AUTO: 47 % (ref 14–44)
MCH RBC QN AUTO: 32.9 PG (ref 26.8–34.3)
MCHC RBC AUTO-ENTMCNC: 33.3 G/DL (ref 31.4–37.4)
MCV RBC AUTO: 99 FL (ref 82–98)
MONOCYTES # BLD AUTO: 1.03 THOUSAND/ÂΜL (ref 0.17–1.22)
MONOCYTES NFR BLD AUTO: 14 % (ref 4–12)
NEUTROPHILS # BLD AUTO: 2.3 THOUSANDS/ÂΜL (ref 1.85–7.62)
NEUTS SEG NFR BLD AUTO: 32 % (ref 43–75)
NRBC BLD AUTO-RTO: 0 /100 WBCS
PLATELET # BLD AUTO: 147 THOUSANDS/UL (ref 149–390)
PMV BLD AUTO: 10.8 FL (ref 8.9–12.7)
POTASSIUM SERPL-SCNC: 4.2 MMOL/L (ref 3.5–5.3)
RBC # BLD AUTO: 3.53 MILLION/UL (ref 3.88–5.62)
SODIUM SERPL-SCNC: 139 MMOL/L (ref 135–147)
WBC # BLD AUTO: 7.27 THOUSAND/UL (ref 4.31–10.16)

## 2024-02-10 PROCEDURE — 82948 REAGENT STRIP/BLOOD GLUCOSE: CPT

## 2024-02-10 PROCEDURE — 85025 COMPLETE CBC W/AUTO DIFF WBC: CPT | Performed by: HOSPITALIST

## 2024-02-10 PROCEDURE — 80048 BASIC METABOLIC PNL TOTAL CA: CPT | Performed by: HOSPITALIST

## 2024-02-10 PROCEDURE — 99232 SBSQ HOSP IP/OBS MODERATE 35: CPT | Performed by: HOSPITALIST

## 2024-02-10 RX ADMIN — DICLOFENAC SODIUM 2 G: 10 GEL TOPICAL at 08:29

## 2024-02-10 RX ADMIN — DOCUSATE SODIUM 100 MG: 100 CAPSULE, LIQUID FILLED ORAL at 17:20

## 2024-02-10 RX ADMIN — CALCIUM 1 TABLET: 500 TABLET ORAL at 20:45

## 2024-02-10 RX ADMIN — DICLOFENAC SODIUM 2 G: 10 GEL TOPICAL at 13:27

## 2024-02-10 RX ADMIN — ZONISAMIDE 300 MG: 100 CAPSULE ORAL at 22:08

## 2024-02-10 RX ADMIN — HYDROXYZINE HYDROCHLORIDE 25 MG: 50 TABLET, FILM COATED ORAL at 08:26

## 2024-02-10 RX ADMIN — CALCIUM 1 TABLET: 500 TABLET ORAL at 15:20

## 2024-02-10 RX ADMIN — BENZTROPINE MESYLATE 0.5 MG: 0.5 TABLET ORAL at 17:20

## 2024-02-10 RX ADMIN — HEPARIN SODIUM 5000 UNITS: 5000 INJECTION INTRAVENOUS; SUBCUTANEOUS at 06:17

## 2024-02-10 RX ADMIN — DIVALPROEX SODIUM 750 MG: 250 TABLET, DELAYED RELEASE ORAL at 08:21

## 2024-02-10 RX ADMIN — QUETIAPINE 200 MG: 400 TABLET, FILM COATED, EXTENDED RELEASE ORAL at 15:19

## 2024-02-10 RX ADMIN — GABAPENTIN 300 MG: 300 CAPSULE ORAL at 08:27

## 2024-02-10 RX ADMIN — HYDROXYZINE HYDROCHLORIDE 25 MG: 50 TABLET, FILM COATED ORAL at 15:19

## 2024-02-10 RX ADMIN — QUETIAPINE 400 MG: 400 TABLET, FILM COATED, EXTENDED RELEASE ORAL at 22:08

## 2024-02-10 RX ADMIN — DIVALPROEX SODIUM 1000 MG: 500 TABLET, DELAYED RELEASE ORAL at 22:08

## 2024-02-10 RX ADMIN — HEPARIN SODIUM 5000 UNITS: 5000 INJECTION INTRAVENOUS; SUBCUTANEOUS at 22:08

## 2024-02-10 RX ADMIN — LACOSAMIDE 150 MG: 150 TABLET ORAL at 08:24

## 2024-02-10 RX ADMIN — HEPARIN SODIUM 5000 UNITS: 5000 INJECTION INTRAVENOUS; SUBCUTANEOUS at 15:19

## 2024-02-10 RX ADMIN — BENZTROPINE MESYLATE 0.5 MG: 0.5 TABLET ORAL at 08:22

## 2024-02-10 RX ADMIN — GABAPENTIN 300 MG: 300 CAPSULE ORAL at 15:19

## 2024-02-10 RX ADMIN — LEVETIRACETAM 1500 MG: 500 TABLET, FILM COATED ORAL at 08:27

## 2024-02-10 RX ADMIN — LISINOPRIL 10 MG: 10 TABLET ORAL at 08:25

## 2024-02-10 RX ADMIN — QUETIAPINE 200 MG: 400 TABLET, FILM COATED, EXTENDED RELEASE ORAL at 08:23

## 2024-02-10 RX ADMIN — DOCUSATE SODIUM 100 MG: 100 CAPSULE, LIQUID FILLED ORAL at 08:22

## 2024-02-10 RX ADMIN — B-COMPLEX W/ C & FOLIC ACID TAB 1 TABLET: TAB at 08:23

## 2024-02-10 RX ADMIN — LEVOTHYROXINE SODIUM 150 MCG: 75 TABLET ORAL at 06:16

## 2024-02-10 RX ADMIN — PRAVASTATIN SODIUM 80 MG: 40 TABLET ORAL at 17:20

## 2024-02-10 RX ADMIN — DICLOFENAC SODIUM 2 G: 10 GEL TOPICAL at 22:08

## 2024-02-10 RX ADMIN — GABAPENTIN 300 MG: 300 CAPSULE ORAL at 20:45

## 2024-02-10 RX ADMIN — LEVETIRACETAM 1500 MG: 500 TABLET, FILM COATED ORAL at 20:45

## 2024-02-10 RX ADMIN — DICLOFENAC SODIUM 2 G: 10 GEL TOPICAL at 17:21

## 2024-02-10 RX ADMIN — CHOLECALCIFEROL TAB 25 MCG (1000 UNIT) 1000 UNITS: 25 TAB at 08:26

## 2024-02-10 RX ADMIN — LORATADINE 10 MG: 10 TABLET ORAL at 08:27

## 2024-02-10 RX ADMIN — LACOSAMIDE 200 MG: 50 TABLET ORAL at 22:07

## 2024-02-10 RX ADMIN — INSULIN LISPRO 1 UNITS: 100 INJECTION, SOLUTION INTRAVENOUS; SUBCUTANEOUS at 22:08

## 2024-02-10 RX ADMIN — CALCIUM 1 TABLET: 500 TABLET ORAL at 08:25

## 2024-02-10 NOTE — PLAN OF CARE
Problem: Potential for Falls  Goal: Patient will remain free of falls  Description: INTERVENTIONS:  - Educate patient/family on patient safety including physical limitations  - Instruct patient to call for assistance with activity   - Consult OT/PT to assist with strengthening/mobility   - Keep Call bell within reach  - Keep bed low and locked with side rails adjusted as appropriate  - Keep care items and personal belongings within reach  - Initiate and maintain comfort rounds  - Make Fall Risk Sign visible to staff  - Offer Toileting every 2 Hours, in advance of need  - Initiate/Maintain alarms  - Obtain necessary fall risk management equipment:   - Apply yellow socks and bracelet for high fall risk patients  - Consider moving patient to room near nurses station  Outcome: Progressing     Problem: Prexisting or High Potential for Compromised Skin Integrity  Goal: Skin integrity is maintained or improved  Description: INTERVENTIONS:  - Identify patients at risk for skin breakdown  - Assess and monitor skin integrity  - Assess and monitor nutrition and hydration status  - Monitor labs   - Assess for incontinence   - Turn and reposition patient  - Assist with mobility/ambulation  - Relieve pressure over bony prominences  - Avoid friction and shearing  - Provide appropriate hygiene as needed including keeping skin clean and dry  - Evaluate need for skin moisturizer/barrier cream  - Collaborate with interdisciplinary team   - Patient/family teaching  - Consider wound care consult   Outcome: Progressing     Problem: PAIN - ADULT  Goal: Verbalizes/displays adequate comfort level or baseline comfort level  Description: Interventions:  - Encourage patient to monitor pain and request assistance  - Assess pain using appropriate pain scale  - Administer analgesics based on type and severity of pain and evaluate response  - Implement non-pharmacological measures as appropriate and evaluate response  - Consider cultural and  social influences on pain and pain management  - Notify physician/advanced practitioner if interventions unsuccessful or patient reports new pain  Outcome: Progressing     Problem: INFECTION - ADULT  Goal: Absence or prevention of progression during hospitalization  Description: INTERVENTIONS:  - Assess and monitor for signs and symptoms of infection  - Monitor lab/diagnostic results  - Monitor all insertion sites, i.e. indwelling lines, tubes, and drains  - Monitor endotracheal if appropriate and nasal secretions for changes in amount and color  - Maud appropriate cooling/warming therapies per order  - Administer medications as ordered  - Instruct and encourage patient and family to use good hand hygiene technique  - Identify and instruct in appropriate isolation precautions for identified infection/condition  Outcome: Progressing  Goal: Absence of fever/infection during neutropenic period  Description: INTERVENTIONS:  - Monitor WBC    Outcome: Progressing     Problem: SAFETY ADULT  Goal: Maintain or return to baseline ADL function  Description: INTERVENTIONS:  -  Assess patient's ability to carry out ADLs; assess patient's baseline for ADL function and identify physical deficits which impact ability to perform ADLs (bathing, care of mouth/teeth, toileting, grooming, dressing, etc.)  - Assess/evaluate cause of self-care deficits   - Assess range of motion  - Assess patient's mobility; develop plan if impaired  - Assess patient's need for assistive devices and provide as appropriate  - Encourage maximum independence but intervene and supervise when necessary  - Involve family in performance of ADLs  - Assess for home care needs following discharge   - Consider OT consult to assist with ADL evaluation and planning for discharge  - Provide patient education as appropriate  Outcome: Progressing  Goal: Maintains/Returns to pre admission functional level  Description: INTERVENTIONS:  - Perform AM-PAC 6 Click Basic  Mobility/ Daily Activity assessment daily.  - Set and communicate daily mobility goal to care team and patient/family/caregiver.   - Collaborate with rehabilitation services on mobility goals if consulted  - Reposition patient every 2 hours.  - Out of bed for toileting/meals  - Record patient progress and toleration of activity level   Outcome: Progressing     Problem: DISCHARGE PLANNING  Goal: Discharge to home or other facility with appropriate resources  Description: INTERVENTIONS:  - Identify barriers to discharge w/patient and caregiver  - Arrange for needed discharge resources and transportation as appropriate  - Identify discharge learning needs (meds, wound care, etc.)  - Refer to Case Management Department for coordinating discharge planning if the patient needs post-hospital services based on physician/advanced practitioner order or complex needs related to functional status, cognitive ability, or social support system  Outcome: Progressing     Problem: Knowledge Deficit  Goal: Patient/family/caregiver demonstrates understanding of disease process, treatment plan, medications, and discharge instructions  Description: Complete learning assessment and assess knowledge base.  Interventions:  - Provide teaching at level of understanding  - Provide teaching via preferred learning methods  Outcome: Progressing

## 2024-02-10 NOTE — ASSESSMENT & PLAN NOTE
Status post a mechanical fall prior to arrival   CT right lower extremity imaging confirmed a comminuted and nondisplaced fractures of the right greater trochanter with no evidence of femoral neck fracture.   Status post an orthopedic surgical evaluation  No indication at this time for an acute phase inpatient surgical intervention  Okay for weightbearing as tolerated  Continue current pain medication management regimen  Status post a PT and OT evaluation, they recommend postacute rehabilitation services  Status post a psychiatric evaluation-the patient has been cleared from a psych standpoint for rehab at a facility  Case management is working on placement-hopeful discharge planning for the week of 2/12/2024

## 2024-02-10 NOTE — PROGRESS NOTES
[facility]  Progress Note  Name: Jairon Oconnell I  MRN: [id number]  Unit/Bed#: -01 I Date of Admission: 2/4/2024   Date of Service: 2/10/2024 I Hospital Day: 6    Assessment/Plan   * Nondisplaced fracture of greater trochanter of right femur, initial encounter for closed fracture (HCC)  Assessment & Plan  Status post a mechanical fall prior to arrival   CT right lower extremity imaging confirmed a comminuted and nondisplaced fractures of the right greater trochanter with no evidence of femoral neck fracture.   Status post an orthopedic surgical evaluation  No indication at this time for an acute phase inpatient surgical intervention  Okay for weightbearing as tolerated  Continue current pain medication management regimen  Status post a PT and OT evaluation, they recommend postacute rehabilitation services  Status post a psychiatric evaluation-the patient has been cleared from a psych standpoint for rehab at a facility  Case management is working on placement-hopeful discharge planning for the week of 2/12/2024    Cerebral palsy (HCC)  Assessment & Plan  He lives in a group home and has caregiver  Continue supportive care    Essential hypertension  Assessment & Plan  Blood pressure stable  Continue lisinopril    Generalized convulsive epilepsy (HCC)  Assessment & Plan  Currently controlled  Continue Depakote 750 mg in the morning and at 1000 mg at bedtime, Vimpat 150 mg in the morning and 200 mg at bedtime, Keppra 750 mg BID, and zonisamide 300 mg at bedtime      Acquired hypothyroidism  Assessment & Plan  Continue levothyroxine 150 mcg daily    Type 2 diabetes mellitus with diabetic neuropathy, without long-term current use of insulin (HCC)  Assessment & Plan  Lab Results   Component Value Date    HGBA1C 5.5 11/27/2023       Recent Labs     02/09/24  1123 02/09/24  1556 02/09/24  2115 02/10/24  0723   POCGLU 167* 114 148* 107         Blood Sugar Average: Last 72 hrs:  (P)  [id number]  Oral hypoglycemic medications remain on hold  Target blood sugar for the hospital is 140-180  Continue Accu-Cheks before meals and at bedtime with sliding scale insulin coverage  Diabetic neuropathy-continue gabapentin    Hyperlipidemia  Assessment & Plan  Continue pravastatin    Behavior concern in adult  Assessment & Plan  With impulsive behavior at times  Continue Seroquel 200 mg at 8 AM and 2 PM and 400 mg at bedtime  Continue hydroxyzine 25 mg twice daily  Continue benztropine 0.5 mg twice daily  Supportive care             \  VTE Pharmacologic Prophylaxis: VTE Score: 6 High Risk (Score >/= 5) - Pharmacological DVT Prophylaxis Ordered: heparin. Sequential Compression Devices Ordered.    Mobility:   Basic Mobility Inpatient Raw Score: 13  JH-HLM Goal: 4: Move to chair/commode  JH-HLM Achieved: 4: Move to chair/commode  HLM Goal NOT achieved. Continue with multidisciplinary rounding and encourage appropriate mobility to improve upon HLM goals.    Patient Centered Rounds: I performed bedside rounds with nursing staff today.   Discussions with Specialists or Other Care Team Provider: Orthopedic surgery    Education and Discussions with Family / Patient: Updated  (sister) via phone.    Total Time Spent on Date of Encounter in care of patient: 30 mins. This time was spent on one or more of the following: performing physical exam; counseling and coordination of care; obtaining or reviewing history; documenting in the medical record; reviewing/ordering tests, medications or procedures; communicating with other healthcare professionals and discussing with patient's family/caregivers.    Current Length of Stay: 6 day(s)  Current Patient Status: Inpatient   Certification Statement: The patient will continue to require additional inpatient hospital stay due to the need for placement  Discharge Plan:  Patient remains medically stable for discharge, case management is working on placement  options    Code Status: Level 1 - Full Code    Subjective:   Patient seen, resting in bed, appears comfortable, denies pain by nodding his head no when asked if he has pain    Objective:     Vitals:   Temp (24hrs), Av.3 °F (36.3 °C), Min:96.1 °F (35.6 °C), Max:98.2 °F (36.8 °C)    Temp:  [96.1 °F (35.6 °C)-98.2 °F (36.8 °C)] 96.1 °F (35.6 °C)  HR:  [] 75  Resp:  [17-19] 17  BP: (120-150)/(64-88) 125/70  SpO2:  [93 %-95 %] 93 %  Body mass index is 21.14 kg/m².     Input and Output Summary (last 24 hours):     Intake/Output Summary (Last 24 hours) at 2/10/2024 0742  Last data filed at 2/10/2024 0635  Gross per 24 hour   Intake 600 ml   Output 1075 ml   Net -475 ml       Physical Exam:   Physical Exam  Vitals and nursing note reviewed.   Constitutional:       General: He is not in acute distress.     Appearance: Normal appearance. He is not ill-appearing.   HENT:      Head: Normocephalic and atraumatic.      Nose: Nose normal.   Eyes:      Extraocular Movements: Extraocular movements intact.      Pupils: Pupils are equal, round, and reactive to light.   Cardiovascular:      Rate and Rhythm: Normal rate and regular rhythm.      Pulses: Normal pulses.      Heart sounds: Normal heart sounds. No murmur heard.     No friction rub. No gallop.   Pulmonary:      Effort: Pulmonary effort is normal.      Breath sounds: Normal breath sounds.   Abdominal:      General: There is no distension.      Palpations: Abdomen is soft. There is no mass.      Tenderness: There is no abdominal tenderness. There is no guarding or rebound.   Musculoskeletal:         General: No swelling or tenderness. Normal range of motion.      Cervical back: Normal range of motion and neck supple. No rigidity. No muscular tenderness.      Right lower leg: No edema.      Left lower leg: No edema.   Skin:     General: Skin is warm.      Capillary Refill: Capillary refill takes less than 2 seconds.      Findings: No erythema or rash.   Neurological:       General: No focal deficit present.      Mental Status: He is alert. Mental status is at baseline.      Comments: At baseline in the setting of having cerebral palsy   Psychiatric:         Mood and Affect: Mood normal.         Behavior: Behavior normal.          Additional Data:     Labs:  Results from last 7 days   Lab Units 02/10/24  0614   WBC Thousand/uL 7.27   HEMOGLOBIN g/dL 11.6*   HEMATOCRIT % 34.8*   PLATELETS Thousands/uL 147*   NEUTROS PCT % 32*   LYMPHS PCT % 47*   MONOS PCT % 14*   EOS PCT % 5     Results from last 7 days   Lab Units 02/10/24  0614   SODIUM mmol/L 139   POTASSIUM mmol/L 4.2   CHLORIDE mmol/L 104   CO2 mmol/L 28   BUN mg/dL 20   CREATININE mg/dL 0.71   ANION GAP mmol/L 7   CALCIUM mg/dL 9.2   GLUCOSE RANDOM mg/dL 119         Results from last 7 days   Lab Units 02/10/24  0723 02/09/24  2115 02/09/24  1556 02/09/24  1123 02/09/24  0649 02/08/24  2048 02/08/24  1616 02/08/24  1058 02/08/24  0653 02/07/24  2109 02/07/24  1630 02/07/24  1107   POC GLUCOSE mg/dl 107 148* 114 167* 106 186* 143* 104 125 179* 215* 129               Lines/Drains:  Invasive Devices       Peripheral Intravenous Line  Duration             Peripheral IV 02/09/24 Dorsal (posterior);Left Hand <1 day                          Imaging: No pertinent imaging reviewed.    Recent Cultures (last 7 days):         Last 24 Hours Medication List:   Current Facility-Administered Medications   Medication Dose Route Frequency Provider Last Rate    acetaminophen  650 mg Oral Q6H PRN HUNTER Main      benztropine  0.5 mg Oral BID HUNTER Main      calcium carbonate  1 tablet Oral TID HUNTER Main      cholecalciferol  1,000 Units Oral Daily HUNTER Main      vitamin B-12  1,000 mcg Oral Every Other Day HUNTER Main      Diclofenac Sodium  2 g Topical 4x Daily Jose Angel Carpenter PA-C      divalproex sodium  1,000 mg Oral HS HUNTER Main      divalproex sodium  750 mg Oral  Daily Ashley M Florentino, CRNP      docusate sodium  100 mg Oral BID Ashley CAITLIN LaureanoFlorentino, CRNP      gabapentin  300 mg Oral TID Ashley M Florentino, CRNP      heparin (porcine)  5,000 Units Subcutaneous Q8H RONALD Ashley M Florentino, CRNP      hydrOXYzine HCL  25 mg Oral BID Ahsley M Florentino, CRNP      insulin lispro  1-5 Units Subcutaneous TID AC Ashley M Florentino, CRNP      insulin lispro  1-5 Units Subcutaneous HS Ashley M Florentino, CRNP      lacosamide  150 mg Oral Daily Ashley M Florentino, CRNP      lacosamide  200 mg Oral HS Ashley M Florentino, CRNP      levETIRAcetam  1,500 mg Oral Q12H RONALD Ashleyrin Laureanoust, CRNP      levothyroxine  150 mcg Oral Early Morning Ashley M Florentino, CRNP      lisinopril  10 mg Oral Daily Ashley M Florentino, CRNP      loratadine  10 mg Oral Daily Ashleyrin Laureanoust, CRNP      multivitamin stress formula  1 tablet Oral Daily Ashleyrin Laureanoust, CRNP      pravastatin  80 mg Oral Daily With Dinner Ashleyrin Laureanoust, CRNP      QUEtiapine  200 mg Oral BID Ashleyrin Laureanoust, CRNP      QUEtiapine  400 mg Oral HS Ashleyrin Laureanoust, CRNP      temazepam  15 mg Oral HS PRN Ashley M Florentino, CRNP      zonisamide  300 mg Oral HS Ashley M Florentino, CRNP          Today, Patient Was Seen By: Giuliana Shetty MD    **Please Note: This note may have been constructed using a voice recognition system.**

## 2024-02-10 NOTE — ASSESSMENT & PLAN NOTE
Lab Results   Component Value Date    HGBA1C 5.5 11/27/2023       Recent Labs     02/09/24  1123 02/09/24  1556 02/09/24  2115 02/10/24  0723   POCGLU 167* 114 148* 107         Blood Sugar Average: Last 72 hrs:  (P) [id number]  Oral hypoglycemic medications remain on hold  Target blood sugar for the hospital is 140-180  Continue Accu-Cheks before meals and at bedtime with sliding scale insulin coverage  Diabetic neuropathy-continue gabapentin

## 2024-02-10 NOTE — PLAN OF CARE
Problem: Potential for Falls  Goal: Patient will remain free of falls  Description: INTERVENTIONS:  - Educate patient/family on patient safety including physical limitations  - Instruct patient to call for assistance with activity   - Consult OT/PT to assist with strengthening/mobility   - Keep Call bell within reach  - Keep bed low and locked with side rails adjusted as appropriate  - Keep care items and personal belongings within reach  - Initiate and maintain comfort rounds  - Make Fall Risk Sign visible to staff  - Offer Toileting every 2 Hours, in advance of need  - Initiate/Maintain alarms  - Obtain necessary fall risk management equipment:   - Apply yellow socks and bracelet for high fall risk patients  - Consider moving patient to room near nurses station  Outcome: Progressing     Problem: Prexisting or High Potential for Compromised Skin Integrity  Goal: Skin integrity is maintained or improved  Description: INTERVENTIONS:  - Identify patients at risk for skin breakdown  - Assess and monitor skin integrity  - Assess and monitor nutrition and hydration status  - Monitor labs   - Assess for incontinence   - Turn and reposition patient  - Assist with mobility/ambulation  - Relieve pressure over bony prominences  - Avoid friction and shearing  - Provide appropriate hygiene as needed including keeping skin clean and dry  - Evaluate need for skin moisturizer/barrier cream  - Collaborate with interdisciplinary team   - Patient/family teaching  - Consider wound care consult   Outcome: Progressing     Problem: PAIN - ADULT  Goal: Verbalizes/displays adequate comfort level or baseline comfort level  Description: Interventions:  - Encourage patient to monitor pain and request assistance  - Assess pain using appropriate pain scale  - Administer analgesics based on type and severity of pain and evaluate response  - Implement non-pharmacological measures as appropriate and evaluate response  - Consider cultural and  social influences on pain and pain management  - Notify physician/advanced practitioner if interventions unsuccessful or patient reports new pain  Outcome: Progressing     Problem: INFECTION - ADULT  Goal: Absence or prevention of progression during hospitalization  Description: INTERVENTIONS:  - Assess and monitor for signs and symptoms of infection  - Monitor lab/diagnostic results  - Monitor all insertion sites, i.e. indwelling lines, tubes, and drains  - Monitor endotracheal if appropriate and nasal secretions for changes in amount and color  - [place] appropriate cooling/warming therapies per order  - Administer medications as ordered  - Instruct and encourage patient and family to use good hand hygiene technique  - Identify and instruct in appropriate isolation precautions for identified infection/condition  Outcome: Progressing  Goal: Absence of fever/infection during neutropenic period  Description: INTERVENTIONS:  - Monitor WBC    Outcome: Progressing     Problem: SAFETY ADULT  Goal: Maintain or return to baseline ADL function  Description: INTERVENTIONS:  -  Assess patient's ability to carry out ADLs; assess patient's baseline for ADL function and identify physical deficits which impact ability to perform ADLs (bathing, care of mouth/teeth, toileting, grooming, dressing, etc.)  - Assess/evaluate cause of self-care deficits   - Assess range of motion  - Assess patient's mobility; develop plan if impaired  - Assess patient's need for assistive devices and provide as appropriate  - Encourage maximum independence but intervene and supervise when necessary  - Involve family in performance of ADLs  - Assess for home care needs following discharge   - Consider OT consult to assist with ADL evaluation and planning for discharge  - Provide patient education as appropriate  Outcome: Progressing  Goal: Maintains/Returns to pre admission functional level  Description: INTERVENTIONS:  - Perform AM-PAC 6 Click Basic  Mobility/ Daily Activity assessment daily.  - Set and communicate daily mobility goal to care team and patient/family/caregiver.   - Collaborate with rehabilitation services on mobility goals if consulted  - Reposition patient every 2 hours.  - Out of bed for toileting/meals  - Record patient progress and toleration of activity level   Outcome: Progressing     Problem: DISCHARGE PLANNING  Goal: Discharge to home or other facility with appropriate resources  Description: INTERVENTIONS:  - Identify barriers to discharge w/patient and caregiver  - Arrange for needed discharge resources and transportation as appropriate  - Identify discharge learning needs (meds, wound care, etc.)  - Refer to Case Management Department for coordinating discharge planning if the patient needs post-hospital services based on physician/advanced practitioner order or complex needs related to functional status, cognitive ability, or social support system  Outcome: Progressing     Problem: Knowledge Deficit  Goal: Patient/family/caregiver demonstrates understanding of disease process, treatment plan, medications, and discharge instructions  Description: Complete learning assessment and assess knowledge base.  Interventions:  - Provide teaching at level of understanding  - Provide teaching via preferred learning methods  Outcome: Progressing

## 2024-02-11 LAB
GLUCOSE SERPL-MCNC: 104 MG/DL (ref 65–140)
GLUCOSE SERPL-MCNC: 110 MG/DL (ref 65–140)
GLUCOSE SERPL-MCNC: 130 MG/DL (ref 65–140)
GLUCOSE SERPL-MCNC: 195 MG/DL (ref 65–140)

## 2024-02-11 PROCEDURE — 82948 REAGENT STRIP/BLOOD GLUCOSE: CPT

## 2024-02-11 PROCEDURE — 99232 SBSQ HOSP IP/OBS MODERATE 35: CPT | Performed by: HOSPITALIST

## 2024-02-11 RX ADMIN — LACOSAMIDE 200 MG: 50 TABLET ORAL at 21:29

## 2024-02-11 RX ADMIN — DICLOFENAC SODIUM 2 G: 10 GEL TOPICAL at 17:21

## 2024-02-11 RX ADMIN — CALCIUM 1 TABLET: 500 TABLET ORAL at 08:07

## 2024-02-11 RX ADMIN — BENZTROPINE MESYLATE 0.5 MG: 0.5 TABLET ORAL at 17:19

## 2024-02-11 RX ADMIN — QUETIAPINE 200 MG: 400 TABLET, FILM COATED, EXTENDED RELEASE ORAL at 08:09

## 2024-02-11 RX ADMIN — DICLOFENAC SODIUM 2 G: 10 GEL TOPICAL at 08:15

## 2024-02-11 RX ADMIN — QUETIAPINE 200 MG: 400 TABLET, FILM COATED, EXTENDED RELEASE ORAL at 15:02

## 2024-02-11 RX ADMIN — LEVETIRACETAM 1500 MG: 500 TABLET, FILM COATED ORAL at 08:08

## 2024-02-11 RX ADMIN — LORATADINE 10 MG: 10 TABLET ORAL at 08:10

## 2024-02-11 RX ADMIN — ACETAMINOPHEN 650 MG: 325 TABLET ORAL at 18:20

## 2024-02-11 RX ADMIN — TEMAZEPAM 15 MG: 7.5 CAPSULE ORAL at 23:06

## 2024-02-11 RX ADMIN — LACOSAMIDE 150 MG: 150 TABLET ORAL at 08:07

## 2024-02-11 RX ADMIN — CYANOCOBALAMIN TAB 500 MCG 1000 MCG: 500 TAB at 08:09

## 2024-02-11 RX ADMIN — B-COMPLEX W/ C & FOLIC ACID TAB 1 TABLET: TAB at 08:09

## 2024-02-11 RX ADMIN — LEVOTHYROXINE SODIUM 150 MCG: 75 TABLET ORAL at 06:12

## 2024-02-11 RX ADMIN — BENZTROPINE MESYLATE 0.5 MG: 0.5 TABLET ORAL at 08:10

## 2024-02-11 RX ADMIN — ZONISAMIDE 300 MG: 100 CAPSULE ORAL at 21:29

## 2024-02-11 RX ADMIN — DOCUSATE SODIUM 100 MG: 100 CAPSULE, LIQUID FILLED ORAL at 17:19

## 2024-02-11 RX ADMIN — QUETIAPINE 400 MG: 400 TABLET, FILM COATED, EXTENDED RELEASE ORAL at 21:29

## 2024-02-11 RX ADMIN — CALCIUM 1 TABLET: 500 TABLET ORAL at 21:30

## 2024-02-11 RX ADMIN — HEPARIN SODIUM 5000 UNITS: 5000 INJECTION INTRAVENOUS; SUBCUTANEOUS at 06:11

## 2024-02-11 RX ADMIN — DIVALPROEX SODIUM 750 MG: 250 TABLET, DELAYED RELEASE ORAL at 08:08

## 2024-02-11 RX ADMIN — DIVALPROEX SODIUM 1000 MG: 500 TABLET, DELAYED RELEASE ORAL at 21:29

## 2024-02-11 RX ADMIN — INSULIN LISPRO 1 UNITS: 100 INJECTION, SOLUTION INTRAVENOUS; SUBCUTANEOUS at 16:03

## 2024-02-11 RX ADMIN — LISINOPRIL 10 MG: 10 TABLET ORAL at 08:08

## 2024-02-11 RX ADMIN — LEVETIRACETAM 1500 MG: 500 TABLET, FILM COATED ORAL at 21:30

## 2024-02-11 RX ADMIN — CHOLECALCIFEROL TAB 25 MCG (1000 UNIT) 1000 UNITS: 25 TAB at 08:10

## 2024-02-11 RX ADMIN — HYDROXYZINE HYDROCHLORIDE 25 MG: 50 TABLET, FILM COATED ORAL at 15:03

## 2024-02-11 RX ADMIN — GABAPENTIN 300 MG: 300 CAPSULE ORAL at 15:03

## 2024-02-11 RX ADMIN — GABAPENTIN 300 MG: 300 CAPSULE ORAL at 21:30

## 2024-02-11 RX ADMIN — CALCIUM 1 TABLET: 500 TABLET ORAL at 15:03

## 2024-02-11 RX ADMIN — HYDROXYZINE HYDROCHLORIDE 25 MG: 50 TABLET, FILM COATED ORAL at 08:09

## 2024-02-11 RX ADMIN — PRAVASTATIN SODIUM 80 MG: 40 TABLET ORAL at 17:19

## 2024-02-11 RX ADMIN — HEPARIN SODIUM 5000 UNITS: 5000 INJECTION INTRAVENOUS; SUBCUTANEOUS at 21:30

## 2024-02-11 RX ADMIN — DOCUSATE SODIUM 100 MG: 100 CAPSULE, LIQUID FILLED ORAL at 08:09

## 2024-02-11 RX ADMIN — DICLOFENAC SODIUM 2 G: 10 GEL TOPICAL at 21:30

## 2024-02-11 RX ADMIN — DICLOFENAC SODIUM 2 G: 10 GEL TOPICAL at 12:36

## 2024-02-11 RX ADMIN — GABAPENTIN 300 MG: 300 CAPSULE ORAL at 08:10

## 2024-02-11 NOTE — PLAN OF CARE
Problem: Potential for Falls  Goal: Patient will remain free of falls  Description: INTERVENTIONS:  - Educate patient/family on patient safety including physical limitations  - Instruct patient to call for assistance with activity   - Consult OT/PT to assist with strengthening/mobility   - Keep Call bell within reach  - Keep bed low and locked with side rails adjusted as appropriate  - Keep care items and personal belongings within reach  - Initiate and maintain comfort rounds  - Make Fall Risk Sign visible to staff  - Offer Toileting every 2 Hours, in advance of need  - Initiate/Maintain alarms  - Obtain necessary fall risk management equipment:   - Apply yellow socks and bracelet for high fall risk patients  - Consider moving patient to room near nurses station  Outcome: Progressing     Problem: Prexisting or High Potential for Compromised Skin Integrity  Goal: Skin integrity is maintained or improved  Description: INTERVENTIONS:  - Identify patients at risk for skin breakdown  - Assess and monitor skin integrity  - Assess and monitor nutrition and hydration status  - Monitor labs   - Assess for incontinence   - Turn and reposition patient  - Assist with mobility/ambulation  - Relieve pressure over bony prominences  - Avoid friction and shearing  - Provide appropriate hygiene as needed including keeping skin clean and dry  - Evaluate need for skin moisturizer/barrier cream  - Collaborate with interdisciplinary team   - Patient/family teaching  - Consider wound care consult   Outcome: Progressing     Problem: PAIN - ADULT  Goal: Verbalizes/displays adequate comfort level or baseline comfort level  Description: Interventions:  - Encourage patient to monitor pain and request assistance  - Assess pain using appropriate pain scale  - Administer analgesics based on type and severity of pain and evaluate response  - Implement non-pharmacological measures as appropriate and evaluate response  - Consider cultural and  social influences on pain and pain management  - Notify physician/advanced practitioner if interventions unsuccessful or patient reports new pain  Outcome: Progressing     Problem: INFECTION - ADULT  Goal: Absence or prevention of progression during hospitalization  Description: INTERVENTIONS:  - Assess and monitor for signs and symptoms of infection  - Monitor lab/diagnostic results  - Monitor all insertion sites, i.e. indwelling lines, tubes, and drains  - Monitor endotracheal if appropriate and nasal secretions for changes in amount and color  - [place] appropriate cooling/warming therapies per order  - Administer medications as ordered  - Instruct and encourage patient and family to use good hand hygiene technique  - Identify and instruct in appropriate isolation precautions for identified infection/condition  Outcome: Progressing  Goal: Absence of fever/infection during neutropenic period  Description: INTERVENTIONS:  - Monitor WBC    Outcome: Progressing     Problem: SAFETY ADULT  Goal: Maintain or return to baseline ADL function  Description: INTERVENTIONS:  -  Assess patient's ability to carry out ADLs; assess patient's baseline for ADL function and identify physical deficits which impact ability to perform ADLs (bathing, care of mouth/teeth, toileting, grooming, dressing, etc.)  - Assess/evaluate cause of self-care deficits   - Assess range of motion  - Assess patient's mobility; develop plan if impaired  - Assess patient's need for assistive devices and provide as appropriate  - Encourage maximum independence but intervene and supervise when necessary  - Involve family in performance of ADLs  - Assess for home care needs following discharge   - Consider OT consult to assist with ADL evaluation and planning for discharge  - Provide patient education as appropriate  Outcome: Progressing  Goal: Maintains/Returns to pre admission functional level  Description: INTERVENTIONS:  - Perform AM-PAC 6 Click Basic  Mobility/ Daily Activity assessment daily.  - Set and communicate daily mobility goal to care team and patient/family/caregiver.   - Collaborate with rehabilitation services on mobility goals if consulted  - Reposition patient every 2 hours.  - Out of bed for toileting/meals  - Record patient progress and toleration of activity level   Outcome: Progressing     Problem: DISCHARGE PLANNING  Goal: Discharge to home or other facility with appropriate resources  Description: INTERVENTIONS:  - Identify barriers to discharge w/patient and caregiver  - Arrange for needed discharge resources and transportation as appropriate  - Identify discharge learning needs (meds, wound care, etc.)  - Refer to Case Management Department for coordinating discharge planning if the patient needs post-hospital services based on physician/advanced practitioner order or complex needs related to functional status, cognitive ability, or social support system  Outcome: Progressing     Problem: Knowledge Deficit  Goal: Patient/family/caregiver demonstrates understanding of disease process, treatment plan, medications, and discharge instructions  Description: Complete learning assessment and assess knowledge base.  Interventions:  - Provide teaching at level of understanding  - Provide teaching via preferred learning methods  Outcome: Progressing

## 2024-02-11 NOTE — PROGRESS NOTES
[facility]  Progress Note  Name: Jairon Oconnell I  MRN: [id number]  Unit/Bed#: -01 I Date of Admission: 2/4/2024   Date of Service: 2/11/2024 I Hospital Day: 7    Assessment/Plan   * Nondisplaced fracture of greater trochanter of right femur, initial encounter for closed fracture (HCC)  Assessment & Plan  Status post a mechanical fall prior to arrival   CT right lower extremity imaging confirmed a comminuted and nondisplaced fractures of the right greater trochanter with no evidence of femoral neck fracture.   Status post an orthopedic surgical evaluation  No indication at this time for an acute phase inpatient surgical intervention  Okay for weightbearing as tolerated  Continue current pain medication management regimen  Status post a PT and OT evaluation, they recommend postacute rehabilitation services  Status post a psychiatric evaluation-the patient has been cleared from a psych standpoint for rehab at a facility  Case management is working on placement-hopeful discharge planning for the week of 2/12/2024    Cerebral palsy (HCC)  Assessment & Plan  He lives in a group home and has caregiver  Continue supportive care    Essential hypertension  Assessment & Plan  Blood pressure stable  Continue lisinopril    Generalized convulsive epilepsy (HCC)  Assessment & Plan  Currently controlled  Continue Depakote 750 mg in the morning and at 1000 mg at bedtime, Vimpat 150 mg in the morning and 200 mg at bedtime, Keppra 750 mg BID, and zonisamide 300 mg at bedtime      Acquired hypothyroidism  Assessment & Plan  Continue levothyroxine 150 mcg daily    Type 2 diabetes mellitus with diabetic neuropathy, without long-term current use of insulin (HCC)  Assessment & Plan  Lab Results   Component Value Date    HGBA1C 5.5 11/27/2023       Recent Labs     02/10/24  1115 02/10/24  1600 02/10/24  2106 02/11/24  0714   POCGLU 96 139 193* 130         Blood Sugar Average: Last 72 hrs:  (P)  [id number]  Oral hypoglycemic medications remain on hold  Target blood sugar for the hospital is 140-180  Continue Accu-Cheks before meals and at bedtime with sliding scale insulin coverage  Diabetic neuropathy-continue gabapentin    Hyperlipidemia  Assessment & Plan  Continue pravastatin    Behavior concern in adult  Assessment & Plan  With impulsive behavior at times  Continue Seroquel 200 mg at 8 AM and 2 PM and 400 mg at bedtime  Continue hydroxyzine 25 mg twice daily  Continue benztropine 0.5 mg twice daily  Supportive care               VTE Pharmacologic Prophylaxis: VTE Score: 6 High Risk (Score >/= 5) - Pharmacological DVT Prophylaxis Ordered: heparin. Sequential Compression Devices Ordered.    Mobility:   Basic Mobility Inpatient Raw Score: 13  JH-HLM Goal: 4: Move to chair/commode  JH-HLM Achieved: 4: Move to chair/commode  HLM Goal achieved. Continue to encourage appropriate mobility.    Patient Centered Rounds: I performed bedside rounds with nursing staff today.   Discussions with Specialists or Other Care Team Provider: Case management    Education and Discussions with Family / Patient:  Attempted to call the patient's Sister kristy at 10:15 AM-no answer.     Total Time Spent on Date of Encounter in care of patient: 30 mins. This time was spent on one or more of the following: performing physical exam; counseling and coordination of care; obtaining or reviewing history; documenting in the medical record; reviewing/ordering tests, medications or procedures; communicating with other healthcare professionals and discussing with patient's family/caregivers.    Current Length of Stay: 7 day(s)  Current Patient Status: Inpatient   Certification Statement: The patient will continue to require additional inpatient hospital stay due to the need for placement  Discharge Plan:  Patient has been medically stable for discharge for almost 5 to 6 days now, case management is still working on  placement    Code Status: Level 1 - Full Code    Subjective:   Patient seen, resting in bed, no new complaints.  Remains at baseline from a mentation standpoint in the setting of having cerebral palsy.  Answers questions nodding his head yes and no    Objective:     Vitals:   Temp (24hrs), Av.1 °F (36.2 °C), Min:96.6 °F (35.9 °C), Max:97.4 °F (36.3 °C)    Temp:  [96.6 °F (35.9 °C)-97.4 °F (36.3 °C)] 96.6 °F (35.9 °C)  HR:  [79-91] 79  Resp:  [18-19] 18  BP: (121-136)/(70-83) 121/70  SpO2:  [94 %-95 %] 94 %  Body mass index is 21.14 kg/m².     Input and Output Summary (last 24 hours):     Intake/Output Summary (Last 24 hours) at 2024 1011  Last data filed at 2024 0902  Gross per 24 hour   Intake 720 ml   Output 475 ml   Net 245 ml       Physical Exam:   Physical Exam  Vitals and nursing note reviewed.   Constitutional:       General: He is not in acute distress.     Appearance: Normal appearance. He is not ill-appearing.   HENT:      Head: Normocephalic and atraumatic.      Nose: Nose normal.   Eyes:      Extraocular Movements: Extraocular movements intact.      Pupils: Pupils are equal, round, and reactive to light.   Cardiovascular:      Rate and Rhythm: Normal rate and regular rhythm.      Pulses: Normal pulses.      Heart sounds: Normal heart sounds. No murmur heard.     No friction rub. No gallop.   Pulmonary:      Effort: Pulmonary effort is normal.      Breath sounds: Normal breath sounds.   Abdominal:      General: There is no distension.      Palpations: Abdomen is soft. There is no mass.      Tenderness: There is no abdominal tenderness. There is no guarding or rebound.   Musculoskeletal:         General: No swelling or tenderness. Normal range of motion.      Cervical back: Normal range of motion and neck supple. No rigidity. No muscular tenderness.      Right lower leg: No edema.      Left lower leg: No edema.   Skin:     General: Skin is warm.      Capillary Refill: Capillary refill takes  less than 2 seconds.      Findings: No erythema or rash.   Neurological:      General: No focal deficit present.      Mental Status: He is alert. Mental status is at baseline.      Comments: At baseline in setting of having cerebral palsy   Psychiatric:         Mood and Affect: Mood normal.         Behavior: Behavior normal.          Additional Data:     Labs:  Results from last 7 days   Lab Units 02/10/24  0614   WBC Thousand/uL 7.27   HEMOGLOBIN g/dL 11.6*   HEMATOCRIT % 34.8*   PLATELETS Thousands/uL 147*   NEUTROS PCT % 32*   LYMPHS PCT % 47*   MONOS PCT % 14*   EOS PCT % 5     Results from last 7 days   Lab Units 02/10/24  0614   SODIUM mmol/L 139   POTASSIUM mmol/L 4.2   CHLORIDE mmol/L 104   CO2 mmol/L 28   BUN mg/dL 20   CREATININE mg/dL 0.71   ANION GAP mmol/L 7   CALCIUM mg/dL 9.2   GLUCOSE RANDOM mg/dL 119         Results from last 7 days   Lab Units 02/11/24  0714 02/10/24  2106 02/10/24  1600 02/10/24  1115 02/10/24  0723 02/09/24  2115 02/09/24  1556 02/09/24  1123 02/09/24  0649 02/08/24  2048 02/08/24  1616 02/08/24  1058   POC GLUCOSE mg/dl 130 193* 139 96 107 148* 114 167* 106 186* 143* 104               Lines/Drains:  Invasive Devices       Peripheral Intravenous Line  Duration             Peripheral IV 02/09/24 Dorsal (posterior);Left Hand 1 day                          Imaging: No pertinent imaging reviewed.    Recent Cultures (last 7 days):         Last 24 Hours Medication List:   Current Facility-Administered Medications   Medication Dose Route Frequency Provider Last Rate    acetaminophen  650 mg Oral Q6H PRN Ashley Good, HUNTER      benztropine  0.5 mg Oral BID HUNTER Main      calcium carbonate  1 tablet Oral TID HUNTER Main      cholecalciferol  1,000 Units Oral Daily HUNTER Main      vitamin B-12  1,000 mcg Oral Every Other Day HUNTER Main      Diclofenac Sodium  2 g Topical 4x Daily Jose Angel Carpenter PA-C      divalproex sodium  1,000 mg  Oral HS Ashley M Florentino, CRNP      divalproex sodium  750 mg Oral Daily Ashleyrin Laureanoust, CRNP      docusate sodium  100 mg Oral BID Ashleyrin Laureanoust, CRNP      gabapentin  300 mg Oral TID Ashleyrin Laureanoust, CRNP      heparin (porcine)  5,000 Units Subcutaneous Q8H RONALD Ashley Laureanoust, CRNP      hydrOXYzine HCL  25 mg Oral BID Ashley M Florentino, CRNP      insulin lispro  1-5 Units Subcutaneous TID AC Ashley M Florentino, CRNP      insulin lispro  1-5 Units Subcutaneous HS Ashley M Florentino, CRNP      lacosamide  150 mg Oral Daily Ashleyrin Laureanoust, CRNP      lacosamide  200 mg Oral HS Ashleyrin Laureanoust, CRNP      levETIRAcetam  1,500 mg Oral Q12H RONALD Ashley Laureanoust, CRNP      levothyroxine  150 mcg Oral Early Morning Ashleyrin Laureanoust, CRNP      lisinopril  10 mg Oral Daily Ashleyrin Laureanoust, CRNP      loratadine  10 mg Oral Daily Ashleyrin Good, CRNP      multivitamin stress formula  1 tablet Oral Daily Ashleyrin Laureanoust, CRNP      pravastatin  80 mg Oral Daily With Dinner Ashley Good, CRNP      QUEtiapine  200 mg Oral BID Ashleyrin Good, CRNP      QUEtiapine  400 mg Oral HS Ashleyrin Laureanoust, CRNP      temazepam  15 mg Oral HS PRN Ashley Laureanoust, CRNP      zonisamide  300 mg Oral HS Ashleyrin Good, CRNP          Today, Patient Was Seen By: Giuliana Shetty MD    **Please Note: This note may have been constructed using a voice recognition system.**

## 2024-02-11 NOTE — ASSESSMENT & PLAN NOTE
Lab Results   Component Value Date    HGBA1C 5.5 11/27/2023       Recent Labs     02/10/24  1115 02/10/24  1600 02/10/24  2106 02/11/24  0714   POCGLU 96 139 193* 130         Blood Sugar Average: Last 72 hrs:  (P) [id number]  Oral hypoglycemic medications remain on hold  Target blood sugar for the hospital is 140-180  Continue Accu-Cheks before meals and at bedtime with sliding scale insulin coverage  Diabetic neuropathy-continue gabapentin

## 2024-02-11 NOTE — PLAN OF CARE
Problem: Potential for Falls  Goal: Patient will remain free of falls  Description: INTERVENTIONS:  - Educate patient/family on patient safety including physical limitations  - Instruct patient to call for assistance with activity   - Consult OT/PT to assist with strengthening/mobility   - Keep Call bell within reach  - Keep bed low and locked with side rails adjusted as appropriate  - Keep care items and personal belongings within reach  - Initiate and maintain comfort rounds  - Make Fall Risk Sign visible to staff  - Offer Toileting every 2 Hours, in advance of need  - Initiate/Maintain alarms  - Obtain necessary fall risk management equipment:   - Apply yellow socks and bracelet for high fall risk patients  - Consider moving patient to room near nurses station  Outcome: Progressing     Problem: Prexisting or High Potential for Compromised Skin Integrity  Goal: Skin integrity is maintained or improved  Description: INTERVENTIONS:  - Identify patients at risk for skin breakdown  - Assess and monitor skin integrity  - Assess and monitor nutrition and hydration status  - Monitor labs   - Assess for incontinence   - Turn and reposition patient  - Assist with mobility/ambulation  - Relieve pressure over bony prominences  - Avoid friction and shearing  - Provide appropriate hygiene as needed including keeping skin clean and dry  - Evaluate need for skin moisturizer/barrier cream  - Collaborate with interdisciplinary team   - Patient/family teaching  - Consider wound care consult   Outcome: Progressing     Problem: PAIN - ADULT  Goal: Verbalizes/displays adequate comfort level or baseline comfort level  Description: Interventions:  - Encourage patient to monitor pain and request assistance  - Assess pain using appropriate pain scale  - Administer analgesics based on type and severity of pain and evaluate response  - Implement non-pharmacological measures as appropriate and evaluate response  - Consider cultural and  social influences on pain and pain management  - Notify physician/advanced practitioner if interventions unsuccessful or patient reports new pain  Outcome: Progressing     Problem: INFECTION - ADULT  Goal: Absence or prevention of progression during hospitalization  Description: INTERVENTIONS:  - Assess and monitor for signs and symptoms of infection  - Monitor lab/diagnostic results  - Monitor all insertion sites, i.e. indwelling lines, tubes, and drains  - Monitor endotracheal if appropriate and nasal secretions for changes in amount and color  - [place] appropriate cooling/warming therapies per order  - Administer medications as ordered  - Instruct and encourage patient and family to use good hand hygiene technique  - Identify and instruct in appropriate isolation precautions for identified infection/condition  Outcome: Progressing  Goal: Absence of fever/infection during neutropenic period  Description: INTERVENTIONS:  - Monitor WBC    Outcome: Progressing     Problem: SAFETY ADULT  Goal: Maintain or return to baseline ADL function  Description: INTERVENTIONS:  -  Assess patient's ability to carry out ADLs; assess patient's baseline for ADL function and identify physical deficits which impact ability to perform ADLs (bathing, care of mouth/teeth, toileting, grooming, dressing, etc.)  - Assess/evaluate cause of self-care deficits   - Assess range of motion  - Assess patient's mobility; develop plan if impaired  - Assess patient's need for assistive devices and provide as appropriate  - Encourage maximum independence but intervene and supervise when necessary  - Involve family in performance of ADLs  - Assess for home care needs following discharge   - Consider OT consult to assist with ADL evaluation and planning for discharge  - Provide patient education as appropriate  Outcome: Progressing  Goal: Maintains/Returns to pre admission functional level  Description: INTERVENTIONS:  - Perform AM-PAC 6 Click Basic  Mobility/ Daily Activity assessment daily.  - Set and communicate daily mobility goal to care team and patient/family/caregiver.   - Collaborate with rehabilitation services on mobility goals if consulted  - Reposition patient every 2 hours.  - Out of bed for toileting/meals  - Record patient progress and toleration of activity level   Outcome: Progressing     Problem: DISCHARGE PLANNING  Goal: Discharge to home or other facility with appropriate resources  Description: INTERVENTIONS:  - Identify barriers to discharge w/patient and caregiver  - Arrange for needed discharge resources and transportation as appropriate  - Identify discharge learning needs (meds, wound care, etc.)  - Refer to Case Management Department for coordinating discharge planning if the patient needs post-hospital services based on physician/advanced practitioner order or complex needs related to functional status, cognitive ability, or social support system  Outcome: Progressing     Problem: Knowledge Deficit  Goal: Patient/family/caregiver demonstrates understanding of disease process, treatment plan, medications, and discharge instructions  Description: Complete learning assessment and assess knowledge base.  Interventions:  - Provide teaching at level of understanding  - Provide teaching via preferred learning methods  Outcome: Progressing

## 2024-02-12 LAB
GLUCOSE SERPL-MCNC: 113 MG/DL (ref 65–140)
GLUCOSE SERPL-MCNC: 122 MG/DL (ref 65–140)
GLUCOSE SERPL-MCNC: 140 MG/DL (ref 65–140)
GLUCOSE SERPL-MCNC: 142 MG/DL (ref 65–140)

## 2024-02-12 PROCEDURE — 97110 THERAPEUTIC EXERCISES: CPT

## 2024-02-12 PROCEDURE — 97535 SELF CARE MNGMENT TRAINING: CPT

## 2024-02-12 PROCEDURE — 97530 THERAPEUTIC ACTIVITIES: CPT

## 2024-02-12 PROCEDURE — 82948 REAGENT STRIP/BLOOD GLUCOSE: CPT

## 2024-02-12 PROCEDURE — 97112 NEUROMUSCULAR REEDUCATION: CPT

## 2024-02-12 PROCEDURE — 99232 SBSQ HOSP IP/OBS MODERATE 35: CPT | Performed by: INTERNAL MEDICINE

## 2024-02-12 RX ADMIN — DICLOFENAC SODIUM 2 G: 10 GEL TOPICAL at 09:40

## 2024-02-12 RX ADMIN — QUETIAPINE 400 MG: 400 TABLET, FILM COATED, EXTENDED RELEASE ORAL at 22:04

## 2024-02-12 RX ADMIN — LEVOTHYROXINE SODIUM 150 MCG: 75 TABLET ORAL at 05:46

## 2024-02-12 RX ADMIN — CHOLECALCIFEROL TAB 25 MCG (1000 UNIT) 1000 UNITS: 25 TAB at 09:40

## 2024-02-12 RX ADMIN — DIVALPROEX SODIUM 1000 MG: 500 TABLET, DELAYED RELEASE ORAL at 22:04

## 2024-02-12 RX ADMIN — LEVETIRACETAM 1500 MG: 500 TABLET, FILM COATED ORAL at 22:00

## 2024-02-12 RX ADMIN — LISINOPRIL 10 MG: 10 TABLET ORAL at 09:39

## 2024-02-12 RX ADMIN — BENZTROPINE MESYLATE 0.5 MG: 0.5 TABLET ORAL at 09:38

## 2024-02-12 RX ADMIN — LORATADINE 10 MG: 10 TABLET ORAL at 09:39

## 2024-02-12 RX ADMIN — HYDROXYZINE HYDROCHLORIDE 25 MG: 50 TABLET, FILM COATED ORAL at 15:10

## 2024-02-12 RX ADMIN — CALCIUM 1 TABLET: 500 TABLET ORAL at 22:00

## 2024-02-12 RX ADMIN — TEMAZEPAM 15 MG: 7.5 CAPSULE ORAL at 22:45

## 2024-02-12 RX ADMIN — LACOSAMIDE 200 MG: 150 TABLET ORAL at 22:04

## 2024-02-12 RX ADMIN — CALCIUM 1 TABLET: 500 TABLET ORAL at 09:38

## 2024-02-12 RX ADMIN — GABAPENTIN 300 MG: 300 CAPSULE ORAL at 15:10

## 2024-02-12 RX ADMIN — HEPARIN SODIUM 5000 UNITS: 5000 INJECTION INTRAVENOUS; SUBCUTANEOUS at 05:46

## 2024-02-12 RX ADMIN — DOCUSATE SODIUM 100 MG: 100 CAPSULE, LIQUID FILLED ORAL at 17:00

## 2024-02-12 RX ADMIN — DICLOFENAC SODIUM 2 G: 10 GEL TOPICAL at 17:01

## 2024-02-12 RX ADMIN — ZONISAMIDE 300 MG: 100 CAPSULE ORAL at 22:04

## 2024-02-12 RX ADMIN — GABAPENTIN 300 MG: 300 CAPSULE ORAL at 09:38

## 2024-02-12 RX ADMIN — GABAPENTIN 300 MG: 300 CAPSULE ORAL at 22:00

## 2024-02-12 RX ADMIN — LEVETIRACETAM 1500 MG: 500 TABLET, FILM COATED ORAL at 09:38

## 2024-02-12 RX ADMIN — PRAVASTATIN SODIUM 80 MG: 40 TABLET ORAL at 17:00

## 2024-02-12 RX ADMIN — QUETIAPINE 200 MG: 400 TABLET, FILM COATED, EXTENDED RELEASE ORAL at 09:44

## 2024-02-12 RX ADMIN — B-COMPLEX W/ C & FOLIC ACID TAB 1 TABLET: TAB at 09:38

## 2024-02-12 RX ADMIN — CALCIUM 1 TABLET: 500 TABLET ORAL at 15:10

## 2024-02-12 RX ADMIN — DIVALPROEX SODIUM 750 MG: 250 TABLET, DELAYED RELEASE ORAL at 09:39

## 2024-02-12 RX ADMIN — DICLOFENAC SODIUM 2 G: 10 GEL TOPICAL at 22:04

## 2024-02-12 RX ADMIN — HEPARIN SODIUM 5000 UNITS: 5000 INJECTION INTRAVENOUS; SUBCUTANEOUS at 22:05

## 2024-02-12 RX ADMIN — BENZTROPINE MESYLATE 0.5 MG: 0.5 TABLET ORAL at 17:00

## 2024-02-12 RX ADMIN — LACOSAMIDE 150 MG: 150 TABLET ORAL at 09:38

## 2024-02-12 RX ADMIN — HYDROXYZINE HYDROCHLORIDE 25 MG: 50 TABLET, FILM COATED ORAL at 09:39

## 2024-02-12 RX ADMIN — ACETAMINOPHEN 650 MG: 325 TABLET ORAL at 11:14

## 2024-02-12 RX ADMIN — HEPARIN SODIUM 5000 UNITS: 5000 INJECTION INTRAVENOUS; SUBCUTANEOUS at 15:11

## 2024-02-12 RX ADMIN — DOCUSATE SODIUM 100 MG: 100 CAPSULE, LIQUID FILLED ORAL at 09:38

## 2024-02-12 RX ADMIN — QUETIAPINE 200 MG: 400 TABLET, FILM COATED, EXTENDED RELEASE ORAL at 15:10

## 2024-02-12 NOTE — PLAN OF CARE
Problem: OCCUPATIONAL THERAPY ADULT  Goal: Performs self-care activities at highest level of function for planned discharge setting.  See evaluation for individualized goals.  Description: Treatment Interventions: ADL retraining, Functional transfer training, UE strengthening/ROM, Endurance training, Patient/family training, Equipment evaluation/education, Compensatory technique education, Energy conservation, Activityengagement, Cognitive reorientation    See flowsheet documentation for full assessment, interventions and recommendations.   Outcome: Progressing  Note: Limitation: Decreased ADL status, Decreased UE strength, Decreased Safe judgement during ADL, Decreased endurance, Decreased self-care trans, Decreased high-level ADLs, Decreased cognition  Prognosis: Fair  Assessment: Patient participated in Skilled OT session this date with interventions consisting of ADL re training with the use of correct body mechnaics, Energy Conservation techniques, safety awareness and fall prevention techniques, therapeutic exercise to: increase functional use of BUEs, increase BUE muscle strength ,  therapeutic activities to: increase activity tolerance, increase postural control, increase trunk control, and increase OOB/ sitting tolerance . Patient agreeable to OT treatment session, upon arrival patient was found supine in bed.  In comparison to previous session, patient with improvements in activity tolerance and functional transfers . Patient requiring verbal cues for safety, verbal cues for correct technique, and frequent rest periods. Patient continues to be functioning below baseline level, occupational performance remains limited secondary to factors listed above and increased risk for falls and injury.   From OT standpoint, recommendation at time of d/c would with moderate intensity OT resources.   Patient to benefit from continued Occupational Therapy treatment while in the hospital to address deficits as defined  above and maximize level of functional independence with ADLs and functional mobility.     Rehab Resource Intensity Level, OT: II (Moderate Resource Intensity)     Riri Pineda MS, OTR/L

## 2024-02-12 NOTE — PLAN OF CARE
Problem: Potential for Falls  Goal: Patient will remain free of falls  Description: INTERVENTIONS:  - Educate patient/family on patient safety including physical limitations  - Instruct patient to call for assistance with activity   - Consult OT/PT to assist with strengthening/mobility   - Keep Call bell within reach  - Keep bed low and locked with side rails adjusted as appropriate  - Keep care items and personal belongings within reach  - Initiate and maintain comfort rounds  - Make Fall Risk Sign visible to staff  - Offer Toileting every 2 Hours, in advance of need  - Initiate/Maintain alarms  - Obtain necessary fall risk management equipment:   - Apply yellow socks and bracelet for high fall risk patients  - Consider moving patient to room near nurses station  Outcome: Progressing     Problem: INFECTION - ADULT  Goal: Absence or prevention of progression during hospitalization  Description: INTERVENTIONS:  - Assess and monitor for signs and symptoms of infection  - Monitor lab/diagnostic results  - Monitor all insertion sites, i.e. indwelling lines, tubes, and drains  - Monitor endotracheal if appropriate and nasal secretions for changes in amount and color  - [place] appropriate cooling/warming therapies per order  - Administer medications as ordered  - Instruct and encourage patient and family to use good hand hygiene technique  - Identify and instruct in appropriate isolation precautions for identified infection/condition  Outcome: Progressing

## 2024-02-12 NOTE — ASSESSMENT & PLAN NOTE
Status post a mechanical fall prior to arrival   CT right lower extremity imaging confirmed a comminuted and nondisplaced fractures of the right greater trochanter with no evidence of femoral neck fracture.   Status post an orthopedic surgical evaluation  No indication at this time for an acute phase inpatient surgical intervention  Okay for weightbearing as tolerated  Continue current pain medication management regimen  Status post a PT and OT evaluation, they recommend postacute rehabilitation services  Status post a psychiatric evaluation-the patient has been cleared from a psych standpoint for rehab at a facility  Case management is working on placement

## 2024-02-12 NOTE — PLAN OF CARE
Problem: PHYSICAL THERAPY ADULT  Goal: Performs mobility at highest level of function for planned discharge setting.  See evaluation for individualized goals.  Description: Treatment/Interventions: Functional transfer training, LE strengthening/ROM, Therapeutic exercise, Endurance training, Cognitive reorientation, Patient/family training, Equipment eval/education, Bed mobility, Gait training, Spoke to nursing, Spoke to case management, OT  Equipment Recommended:  (continue TBD pending progress, RW at this time)       See flowsheet documentation for full assessment, interventions and recommendations.  Outcome: Progressing  Note: Prognosis: Fair  Problem List: Decreased strength, Decreased endurance, Impaired balance, Decreased mobility, Decreased coordination, Decreased cognition, Impaired judgement, Decreased safety awareness, Orthopedic restrictions, Pain  Assessment: Pt seen for PT treatment session this date, consisting of ther act focused on bed mobility & functional transfer training, ther ex focused on strengthening, and gt training on level surfaces to improve pt safety in household environment, neuro re-ed focused on improving postural control and static/dynamic sitting balance on various surfaces. Since previous session, pt has made good progress in terms of improved tolerance to further ambulation attempt with ability to amb x 6' with mod Ax2 HHA. Pertinent barriers during this session include cognitive status and awareness. Current goals and POC established on IE remain appropriate, pt continues to have rehab potential and is making fair progress towards STGs. Pt prognosis for achieving goals is fair, pending pt progress with hospitalization/medical status improvements, and indicated by recent onset, eye contact, and previous response to intervention. Pt continues to be functioning below baseline level, and remains limited 2* factors listed above. PT will continue to see pt during current  hospitalization in order to address the deficits listed above and provide interventions consistent w/ POC in effort to achieve STGs. PT recommends level 2, moderate resource intensity upon discharge.  Barriers to Discharge: Inaccessible home environment     Rehab Resource Intensity Level, PT: II (Moderate Resource Intensity)    See flowsheet documentation for full assessment.

## 2024-02-12 NOTE — ASSESSMENT & PLAN NOTE
Lab Results   Component Value Date    HGBA1C 5.5 11/27/2023       Recent Labs     02/11/24  1554 02/11/24  2139 02/12/24  0648 02/12/24  1056   POCGLU 195* 110 122 113         Blood Sugar Average: Last 72 hrs:  (P) [id number]  Oral hypoglycemic medications remain on hold  Target blood sugar for the hospital is 140-180  Continue Accu-Cheks before meals and at bedtime with sliding scale insulin coverage  Diabetic neuropathy-continue gabapentin

## 2024-02-12 NOTE — NURSING NOTE
"This morning patient was screaming \"F you\" and ripped out call bell, RN in and spoke to pt and went to plug call bell in and pt stuck RN on shoulder and made several attempts to try and hit staff, Patient kept throwing pillows,call bell and all items with in reach. PT placed on VR 1:1 and all items with in reach removed for pt safety. Currently pt is resting in bed comfortably watching t.v, behaviors have improved. Pt complaint, took all meds, used urinal and informed RN he was done and left in place until RN removed. Virtual 1:1 remains in place at this time.   "

## 2024-02-12 NOTE — OCCUPATIONAL THERAPY NOTE
Occupational Therapy Treatment Note      Jairon MADERA Kourtney    2/12/2024    Principal Problem:    Nondisplaced fracture of greater trochanter of right femur, initial encounter for closed fracture ([facility])  Active Problems:    Cerebral palsy (HCC)    Type 2 diabetes mellitus with diabetic neuropathy, without long-term current use of insulin (HCC)    Generalized convulsive epilepsy (HCC)    Hyperlipidemia    Essential hypertension    Acquired hypothyroidism    Behavior concern in adult      Past Medical History:   Diagnosis Date    ADRIANA (acute kidney injury) ([facility]) 9/24/2019    Bacteremia due to Gram-positive bacteria 9/7/2021    Buttock wound, left, subsequent encounter 11/5/2021    COVID-19     CP (cerebral palsy) ([facility])     Depression     Diabetes ([facility])     Disease of thyroid gland     Gait disorder     Gluteal abscess 9/6/2021    Hyperlipidemia     Hypertension     Kidney failure     Kidney stones     Moderate protein-calorie malnutrition ([facility]) 12/22/2021    Osteoporosis     Pressure injury of left buttock, stage 4 ([facility]) 3/9/2022    Psychiatric disorder     Scoliosis     Seizures ([facility])     Sepsis ([facility]) 9/25/2019    Thyroid disease     UTI (urinary tract infection)        Past Surgical History:   Procedure Laterality Date    APPENDECTOMY      IR PICC PLACEMENT SINGLE LUMEN  9/13/2021    IR PICC PLACEMENT SINGLE LUMEN  9/16/2021 02/12/24 1006   OT Last Visit   OT Visit Date 02/12/24   Note Type   Note Type Treatment   Pain Assessment   Pain Assessment Tool FLACC   Pain Rating: FLACC (Rest) - Face 0   Pain Rating: FLACC (Rest) - Legs 0   Pain Rating: FLACC (Rest) - Activity 0   Pain Rating: FLACC (Rest) - Cry 0   Pain Rating: FLACC (Rest) - Consolability 0   Score: FLACC (Rest) 0   Pain Rating: FLACC (Activity) - Face 1   Pain Rating: FLACC (Activity) - Legs 0   Pain Rating: FLACC (Activity) - Activity 1   Pain Rating: FLACC (Activity) - Cry 0   Pain Rating: FLACC (Activity) - Consolability 1   Score: FLACC (Activity)  3   Restrictions/Precautions   Weight Bearing Precautions Per Order Yes   RLE Weight Bearing Per Order (S)  WBAT  (& no active hip aBduction)   Braces or Orthoses   (none reported)   Other Precautions Cognitive;Chair Alarm;Bed Alarm;Seizure;Fall Risk;Pain;WBS   ADL   Where Assessed Chair  (on recliner)   Eating Assistance 3  Moderate Assistance   Eating Deficit Bringing food to mouth assist;Increased time to complete;Supervision/safety;Verbal cueing   Eating Comments to bring utensil to mouth for pudding   Grooming Assistance 3  Moderate Assistance   Grooming Deficit Brushing hair;Wash/dry hands;Wash/dry face;Increased time to complete;Supervision/safety;Verbal cueing   Grooming Comments extensive verbal and tactile cues to initiate task and completion; mod A for grooming tasks for thorough completion   UB Bathing Assistance 3  Moderate Assistance   UB Bathing Deficit Chest;Right arm;Left arm;Abdomen;Increased time to complete;Verbal cueing   UB Bathing Comments mod A to bathe arms and back; Pt able to assist with bathing chest and abdomen with tactile cues for task initiation   LB Bathing Assistance 3  Moderate Assistance   LB Bathing Deficit Right upper leg;Left upper leg;Right lower leg including foot;Left lower leg including foot;Increased time to complete;Verbal cueing   LB Bathing Comments mod A to bathe bilateral lower legs while seated in recliner; pt able to assist with bathing upper legs while seated in recliner and verbal + tactile cues for task initiation and completion   UB Dressing Assistance 5  Supervision/Setup   UB Dressing Deficit Thread LUE;Thread RUE;Supervision/safety   UB Dressing Comments to don hospital gown   LB Dressing Assistance 2  Maximal Assistance   LB Dressing Deficit Don/doff L sock;Don/doff R sock   LB Dressing Comments max A to don bilateral socks while seated on recliner d/t cognition, safety awareness and balance concerns   Toileting Assistance  2  Maximal Assistance   Toileting  "Deficit Increased time to complete;Verbal cueing;Bedside commode   Toileting Comments max A to perianal hygiene while in stance d/t limited mobility and balance   Functional Standing Tolerance   Time x2 mins   Activity for wound changes   Comments with min A x2 for balance and safety   Bed Mobility   Supine to Sit 4  Minimal assistance   Additional items Assist x 1;HOB elevated;Increased time required;LE management   Sit to Supine   (DNT; pt seated in recliner upon conclusion of session)   Additional Comments Tactile and verbal cues for hand placement and body mechanics   Transfers   Sit to Stand 4  Minimal assistance   Additional items Assist x 2;Increased time required;Verbal cues   Stand to Sit 4  Minimal assistance   Additional items Assist x 2;Increased time required;Verbal cues   Stand pivot 3  Moderate assistance   Additional items Assist x 2;Increased time required;Verbal cues   Toilet transfer 3  Moderate assistance   Additional items Assist x 2;Increased time required;Commode   Additional Comments HHA x2; VC for safety and directional turns   Toilet Transfers   Toilet Transfer From   (recliner)   Toilet Transfer Type To and from   Toilet Transfer to Standard bedside commode   Toilet Transfer Technique Stand pivot   Toilet Transfers Moderate assistance   Toilet Transfers Comments HHA x2 with mod A   Therapeutic Exercise - ROM   UE-ROM Yes   ROM- Right Upper Extremities   R Shoulder AAROM;Flexion;Extension   R Weight/Reps/Sets x10 reps   RUE ROM Comment AAROM d/t cognition and limited participation   ROM - Left Upper Extremities    L Shoulder AAROM;Flexion;Extension   L Position Seated   L Weight/Reps/Sets x10 reps   LUE ROM Comment AAROM d/t cognition and limited participation   Subjective   Subjective \"Are they leaving\"   Cognition   Overall Cognitive Status Impaired   Arousal/Participation Responsive;Cooperative   Attention Difficulty attending to directions   Orientation Level Oriented to person   Memory " Unable to assess   Following Commands Follows one step commands with increased time or repetition   Comments Pt required extensive encouragement for participation   Activity Tolerance   Activity Tolerance Patient limited by fatigue;Other (Comment)  (cognition)   Medical Staff Made Aware Yes, RN John made aware of session outcomes   Assessment   Assessment Patient participated in Skilled OT session this date with interventions consisting of ADL re training with the use of correct body mechnaics, Energy Conservation techniques, safety awareness and fall prevention techniques, therapeutic exercise to: increase functional use of BUEs, increase BUE muscle strength ,  therapeutic activities to: increase activity tolerance, increase postural control, increase trunk control, and increase OOB/ sitting tolerance . Patient agreeable to OT treatment session, upon arrival patient was found supine in bed.  In comparison to previous session, patient with improvements in activity tolerance and functional transfers . Patient requiring verbal cues for safety, verbal cues for correct technique, and frequent rest periods. Patient continues to be functioning below baseline level, occupational performance remains limited secondary to factors listed above and increased risk for falls and injury.   From OT standpoint, recommendation at time of d/c would with moderate intensity OT resources.   Patient to benefit from continued Occupational Therapy treatment while in the hospital to address deficits as defined above and maximize level of functional independence with ADLs and functional mobility.   Plan   Treatment Interventions ADL retraining;Functional transfer training;UE strengthening/ROM;Endurance training;Patient/family training;Compensatory technique education;Energy conservation;Activityengagement   Goal Expiration Date 02/15/24   OT Treatment Day 3   OT Frequency 3-5x/wk   Discharge Recommendation   Rehab Resource Intensity Level,  OT II (Moderate Resource Intensity)   Additional Comments  The patient's raw score on the AM-PAC Daily Activity inpatient short form is 14, standardized score is 33.39, less than 39.4. Patients at this level are likely to benefit from discharge with moderate intensity OT resources. Please refer to the recommendation of the Occupational Therapist for safe discharge planning.   Additional Comments 2 Pt seen as a co-session with PT due to the patient's co-morbidities, clinically unstable presentation, and present impairments which are a regression from the patient's baseline.   AM-PAC Daily Activity Inpatient   Lower Body Dressing 1   Bathing 2   Toileting 2   Upper Body Dressing 3   Grooming 3   Eating 3   Daily Activity Raw Score 14   Daily Activity Standardized Score (Calc for Raw Score >=11) 33.39   AM-PAC Applied Cognition Inpatient   Following a Speech/Presentation 2   Understanding Ordinary Conversation 3   Taking Medications 2   Remembering Where Things Are Placed or Put Away 1   Remembering List of 4-5 Errands 1   Taking Care of Complicated Tasks 1   Applied Cognition Raw Score 10   Applied Cognition Standardized Score 24.98     All needs met, call bell within reach  Riri Pineda MS, OTR/L

## 2024-02-12 NOTE — ASSESSMENT & PLAN NOTE
With impulsive behavior at times  Continue Seroquel 200 mg at 8 AM and 2 PM and 400 mg at bedtime  Continue hydroxyzine 25 mg twice daily  Continue benztropine 0.5 mg twice daily  Supportive care  Placed on continual observation

## 2024-02-12 NOTE — CASE MANAGEMENT
Case Management Discharge Planning Note    Patient name Jairon Oconnell  Location /-01 MRN [id number]  : 1965 Date 2024       Current Admission Date: 2024  Current Admission Diagnosis:Nondisplaced fracture of greater trochanter of right femur, initial encounter for closed fracture (formerly [facility])   Patient Active Problem List    Diagnosis Date Noted    Nondisplaced fracture of greater trochanter of right femur, initial encounter for closed fracture (formerly [facility]) 2024    Closed nondisplaced fracture of proximal phalanx of left index finger with routine healing, subsequent encounter 10/30/2023    Pressure ulcer of sacral region, stage 3 (formerly [facility]) 2023    Ambulatory dysfunction 2022    Social problem 2021    Hypomagnesemia 2021    Thrombocytopathia (formerly [facility]) 2020    Hyponatremia 2019    Metabolic encephalopathy 2019    Seborrheic dermatitis of scalp 2019    Nearsightedness 2019    Behavior concern in adult 2019    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formerly [facility]) 2019    Vitamin B12 deficiency 2017    Hyperlipidemia 2016    Vitamin D deficiency 2016    Type 2 diabetes mellitus with diabetic neuropathy, without long-term current use of insulin (formerly [facility]) 06/15/2016    Acquired hypothyroidism 06/15/2016    Cataract 2013    Cerebral palsy (formerly [facility]) 2013    Generalized convulsive epilepsy (formerly [facility]) 2013    Essential hypertension 2013    Osteoporosis 2013    Scoliosis 2013      LOS (days): 8  Geometric Mean LOS (GMLOS) (days): 2.8  Days to GMLOS:-5.3     OBJECTIVE:  Risk of Unplanned Readmission Score: 17.97         Current admission status: Inpatient   Preferred Pharmacy:   [facility] [place] - [address]  [address] [address]  Phone: [phone] Fax: [phone]    Primary Care Provider: HUNTER Brown    Primary Insurance: MEDICARE  Secondary Insurance:  [place] [employer]    DISCHARGE DETAILS:    Discharge planning discussed with:: pt and Kemi was called at 15:43 pm  Freedom of Choice: Yes  Comments - Freedom of Choice: recommendation is for rehab- referrals have been sent- option process has been started- pt needs 3 determination letters before he is able to go to a snf for rehab  CM contacted family/caregiver?: Yes             Contacts  Patient Contacts: Kemi (sister)  Jaylyn (care giver)  Relationship to Patient:: Family  Contact Method: Phone  Phone Number: [phone]  Reason/Outcome: Discharge Planning    Requested Home Health Care         Is the patient interested in HHC at discharge?: No    DME Referral Provided  Referral made for DME?: No    Other Referral/Resources/Interventions Provided:  Interventions: Short Term Rehab  Referral Comments: refefrals sent via maribel- one determination letter has been received    Would you like to participate in our [facility] service program?  : No - Declined    Treatment Team Recommendation: Short Term Rehab (snf rehab- determination letters are needed- bls)

## 2024-02-12 NOTE — WOUND OSTOMY CARE
Consult Note - Wound   Jairon Oconnell 58 y.o. male MRN: [id number]  Unit/Bed#: -01 Encounter: [id number]      History and Present Illness:  Patient admitted with nondisplaced fracture of greater trochanter of right femur. Wound care consulted for left buttock wound. Patient history significant for DDM2, left buttock I&D with VAC, cerebral palsy, HTN, moderate protein calorie malnutrition. Patient lives in a group home.    Assessment Findings:   Patient in bed for assessment, he is able to turn for assessment without assistance, he is an assist to stand to the walker, is independent to eat finger foods after setup. He is awake, alert and cooperative with assessment. Patient has scattered resolving abrasions which are dry to the left lateral and anterior knee and right anterior knee.     1- Bilateral heels intact and blanchable  2- POA-Resolved left buttock stage 3 pressure injury. Wound bed is intact, hyperpigmented with scar tissue.   3- POA-Left upper shoulder resolving abrasion, wound beds are dry, intact, and blanchable, no drainage, slightly ecchymotic to the periwound skin     Skin and Wound Care Plan:   1- Ehob offloading cushion to chair when OOB  2- Elevate heels off the bed and while in the recliner with pillows to offload heels  3- Turn and reposition every two hours, use green wedges to assist with offloading  4- Mepilex heel foams to bilateral heels, sally with P, date, and peel back daily for skin assessment, change every 3 days or with soilage or dislodgement.  5- Skin nourishing cream daily  6- Mepilex bordered dressings to the left shoulder and left lateral buttock scarring, sally left shoulder foams with T, and lateral buttock foam dressing with a P, date, peel back daily for skin assessment, change every 3 days and PRN. (Late entry-this order not listed in note corrected on 2/19, order was placed on 2/12/2024.)    Wound:  Wound 02/04/24 Abrasion(s) Knee Anterior;Right (Active)   Wound  Description Brown 02/12/24 1046   Dee-wound Assessment Clean;Dry;Intact 02/12/24 1046   Dressing Open to air 02/12/24 1046   Patient Tolerance Tolerated well 02/12/24 1046       Wound 02/04/24 Abrasion(s) Knee Anterior;Left (Active)   Wound Description Other (Comment) 02/11/24 2100   Dee-wound Assessment Brown 02/12/24 1046   Dressing Open to air 02/12/24 1046   Patient Tolerance Tolerated well 02/12/24 1046       Wound 02/04/24 Abrasion(s) Head Right (Active)   Wound Description Dry;Brown 02/12/24 1046   Dressing Open to air 02/12/24 1046   Patient Tolerance Tolerated well 02/12/24 1046       Wound 02/06/24 Abrasion(s) Back Lateral;Upper;Left (Active)   Wound Image   02/12/24 1048   Wound Description Intact;Pink;Fragile 02/12/24 1048   Dee-wound Assessment Clean;Dry;Intact 02/12/24 1048   Wound Length (cm) 0 cm 02/12/24 1048   Wound Width (cm) 0 cm 02/12/24 1048   Wound Depth (cm) 0 cm 02/12/24 1048   Wound Surface Area (cm^2) 0 cm^2 02/12/24 1048   Wound Volume (cm^3) 0 cm^3 02/12/24 1048   Calculated Wound Volume (cm^3) 0 cm^3 02/12/24 1048   Drainage Amount None 02/12/24 1048   Non-staged Wound Description Not applicable 02/06/24 1026   Treatments Cleansed 02/12/24 1048   Dressing Foam, Silicon (eg. Allevyn, etc) 02/12/24 1048   Dressing Changed Changed 02/12/24 1048   Patient Tolerance Tolerated well 02/12/24 1048   Dressing Status Clean;Dry;Intact 02/10/24 2045     Reviewed plan of care with primary RN John  Recommendations written as orders  Wound care team to follow weekly while admitted  Questions or concerns [facility] Wound Care Nurse    Charlene Bonilla BSN, RN, CWON

## 2024-02-12 NOTE — PROGRESS NOTES
Critical access hospital  Progress Note  Name: Jairon Oconnell I  MRN: [id number]  Unit/Bed#: -01 I Date of Admission: 2/4/2024   Date of Service: 2/12/2024 I Hospital Day: 8    Assessment/Plan   * Nondisplaced fracture of greater trochanter of right femur, initial encounter for closed fracture (HCC)  Assessment & Plan  Status post a mechanical fall prior to arrival   CT right lower extremity imaging confirmed a comminuted and nondisplaced fractures of the right greater trochanter with no evidence of femoral neck fracture.   Status post an orthopedic surgical evaluation  No indication at this time for an acute phase inpatient surgical intervention  Okay for weightbearing as tolerated  Continue current pain medication management regimen  Status post a PT and OT evaluation, they recommend postacute rehabilitation services  Status post a psychiatric evaluation-the patient has been cleared from a psych standpoint for rehab at a facility  Case management is working on placement    Behavior concern in adult  Assessment & Plan  With impulsive behavior at times  Continue Seroquel 200 mg at 8 AM and 2 PM and 400 mg at bedtime  Continue hydroxyzine 25 mg twice daily  Continue benztropine 0.5 mg twice daily  Supportive care  Placed on continual observation    Acquired hypothyroidism  Assessment & Plan  Continue levothyroxine 150 mcg daily    Essential hypertension  Assessment & Plan  Blood pressure stable  Continue lisinopril    Hyperlipidemia  Assessment & Plan  Continue pravastatin    Generalized convulsive epilepsy (HCC)  Assessment & Plan  Currently controlled  Continue Depakote 750 mg in the morning and at 1000 mg at bedtime, Vimpat 150 mg in the morning and 200 mg at bedtime, Keppra 750 mg BID, and zonisamide 300 mg at bedtime      Type 2 diabetes mellitus with diabetic neuropathy, without long-term current use of insulin (HCC)  Assessment & Plan  Lab Results   Component Value Date    HGBA1C 5.5  2023       Recent Labs     24  1554 24  2139 24  0648 24  1056   POCGLU 195* 110 122 113         Blood Sugar Average: Last 72 hrs:  (P) [id number]  Oral hypoglycemic medications remain on hold  Target blood sugar for the hospital is 140-180  Continue Accu-Cheks before meals and at bedtime with sliding scale insulin coverage  Diabetic neuropathy-continue gabapentin    Cerebral palsy (HCC)  Assessment & Plan  He lives in a group home and has caregiver  Continue supportive care             VTE Pharmacologic Prophylaxis: VTE Score: 6 Moderate Risk (Score 3-4) - Pharmacological DVT Prophylaxis Ordered: heparin.    Mobility:   Basic Mobility Inpatient Raw Score: 13  -HLM Goal: 4: Move to chair/commode  JH-HLM Achieved: 6: Walk 10 steps or more  HLM Goal achieved. Continue to encourage appropriate mobility.    Patient Centered Rounds: I performed bedside rounds with nursing staff today.   Discussions with Specialists or Other Care Team Provider: yes    Education and Discussions with Family / Patient: Updated  (sister) via phone.    Current Length of Stay: 8 day(s)  Current Patient Status: Inpatient   Certification Statement: The patient will continue to require additional inpatient hospital stay due to pending placement  Discharge Plan: Anticipate discharge in 24-48 hrs to rehab facility.    Code Status: Level 1 - Full Code    Subjective:   No overnight events noted.  Patient continues with intermittent agitation.    Objective:     Vitals:   Temp (24hrs), Av.3 °F (36.3 °C), Min:97 °F (36.1 °C), Max:97.7 °F (36.5 °C)    Temp:  [97 °F (36.1 °C)-97.7 °F (36.5 °C)] 97.7 °F (36.5 °C)  HR:  [] 71  Resp:  [18] 18  BP: (107-124)/(64-81) 119/81  SpO2:  [91 %-95 %] 95 %  Body mass index is 21.14 kg/m².     Input and Output Summary (last 24 hours):     Intake/Output Summary (Last 24 hours) at 2024 1302  Last data filed at 2024 1100  Gross per 24 hour   Intake  560 ml   Output 675 ml   Net -115 ml       Physical Exam:   Physical Exam  Constitutional:       General: He is not in acute distress.  HENT:      Head: Normocephalic and atraumatic.      Nose: Nose normal.      Mouth/Throat:      Mouth: Mucous membranes are moist.   Eyes:      Extraocular Movements: Extraocular movements intact.      Conjunctiva/sclera: Conjunctivae normal.   Cardiovascular:      Rate and Rhythm: Normal rate and regular rhythm.   Pulmonary:      Effort: Pulmonary effort is normal. No respiratory distress.   Abdominal:      Palpations: Abdomen is soft.      Tenderness: There is no abdominal tenderness.   Musculoskeletal:         General: Normal range of motion.      Cervical back: Normal range of motion and neck supple.   Skin:     General: Skin is warm and dry.   Neurological:      Mental Status: He is alert.      Comments: Baseline cognitive impairment with history of CP   Psychiatric:      Comments: Intermittent agitation          Additional Data:     Labs:  Results from last 7 days   Lab Units 02/10/24  0614   WBC Thousand/uL 7.27   HEMOGLOBIN g/dL 11.6*   HEMATOCRIT % 34.8*   PLATELETS Thousands/uL 147*   NEUTROS PCT % 32*   LYMPHS PCT % 47*   MONOS PCT % 14*   EOS PCT % 5     Results from last 7 days   Lab Units 02/10/24  0614   SODIUM mmol/L 139   POTASSIUM mmol/L 4.2   CHLORIDE mmol/L 104   CO2 mmol/L 28   BUN mg/dL 20   CREATININE mg/dL 0.71   ANION GAP mmol/L 7   CALCIUM mg/dL 9.2   GLUCOSE RANDOM mg/dL 119         Results from last 7 days   Lab Units 02/12/24  1056 02/12/24  0648 02/11/24  2139 02/11/24  1554 02/11/24  1104 02/11/24  0714 02/10/24  2106 02/10/24  1600 02/10/24  1115 02/10/24  0723 02/09/24  2115 02/09/24  1556   POC GLUCOSE mg/dl 113 122 110 195* 104 130 193* 139 96 107 148* 114               Lines/Drains:  Invasive Devices       Peripheral Intravenous Line  Duration             Peripheral IV 02/09/24 Dorsal (posterior);Left Hand 2 days                          Imaging:  No pertinent imaging reviewed.    Recent Cultures (last 7 days):         Last 24 Hours Medication List:   Current Facility-Administered Medications   Medication Dose Route Frequency Provider Last Rate    acetaminophen  650 mg Oral Q6H PRN Ashley TUCKER Florentino, CRNP      benztropine  0.5 mg Oral BID Ashley M Florentino, CRNP      calcium carbonate  1 tablet Oral TID Ashley Laureanoust, CRNP      cholecalciferol  1,000 Units Oral Daily Ashley M Florentino, CRNP      vitamin B-12  1,000 mcg Oral Every Other Day Ashley M Florentino, CRNP      Diclofenac Sodium  2 g Topical 4x Daily Jose Angel Carpenter PA-C      divalproex sodium  1,000 mg Oral HS Ashley M Florentino, CRNP      divalproex sodium  750 mg Oral Daily Ashley M Florentino, CRNP      docusate sodium  100 mg Oral BID Ashley M Florentino, CRNP      gabapentin  300 mg Oral TID Ashley M Florentino, CRNP      heparin (porcine)  5,000 Units Subcutaneous Q8H RONALD Ashley Laureanoust, CRNP      hydrOXYzine HCL  25 mg Oral BID Ashley M Florentino, CRNP      insulin lispro  1-5 Units Subcutaneous TID AC Ashley M Florentino, CRNP      insulin lispro  1-5 Units Subcutaneous HS Ashley M Florentino, CRNP      lacosamide  150 mg Oral Daily Ashley M Florentino, CRNP      lacosamide  200 mg Oral HS Ashley M Florenitno, CRNP      levETIRAcetam  1,500 mg Oral Q12H RONALD Ashley Laureanoust, CRNP      levothyroxine  150 mcg Oral Early Morning Ashley CAITLIN Florentino, CRNP      lisinopril  10 mg Oral Daily [facility], [facility]      loratadine  10 mg Oral Daily [facility], CR[facility]      multivitamin stress formula  1 tablet Oral Daily Ashley M Florentino, CRNP      pravastatin  80 mg Oral Daily With Dinner Ashley M Florentino, CRNP      QUEtiapine  200 mg Oral BID Ashley CAITLIN Flroentino, CRNP      QUEtiapine  400 mg Oral HS [facility], CRNP      temazepam  15 mg Oral HS PRN Ashley M Florentino, CRNP      zonisamide  300 mg Oral HS [facility], CR[facility]          Today, Patient Was Seen By: Amador Diaz MD    **Please Note: This note may have been  constructed using a voice recognition system.**

## 2024-02-12 NOTE — PHYSICAL THERAPY NOTE
"Physical Therapy Treatment Note       02/12/24 1059   PT Last Visit   PT Visit Date 02/12/24   Note Type   Note Type Treatment   Pain Assessment   Pain Assessment Tool FLACC   Pain Rating: FLACC (Rest) - Face 0   Pain Rating: FLACC (Rest) - Legs 0   Pain Rating: FLACC (Rest) - Activity 0   Pain Rating: FLACC (Rest) - Cry 0   Pain Rating: FLACC (Rest) - Consolability 0   Score: FLACC (Rest) 0   Pain Rating: FLACC (Activity) - Face 1   Pain Rating: FLACC (Activity) - Legs 1   Pain Rating: FLACC (Activity) - Activity 1   Pain Rating: FLACC (Activity) - Cry 1   Pain Rating: FLACC (Activity) - Consolability 1   Score: FLACC (Activity) 5   Restrictions/Precautions   Weight Bearing Precautions Per Order Yes   RLE Weight Bearing Per Order WBAT  (no active hip ABDuction)   Other Precautions Cognitive;Chair Alarm;Bed Alarm;WBS;Fall Risk;Pain   General   Chart Reviewed Yes   Response to Previous Treatment Patient unable to report, no changes reported from family or staff   Family/Caregiver Present No   Cognition   Arousal/Participation Alert;Responsive   Attention Difficulty attending to directions   Orientation Level Oriented to person   Memory Unable to assess   Following Commands Follows one step commands with increased time or repetition   Comments pt requires increased encouragment for participation   Subjective   Subjective \"yeah\"   Bed Mobility   Supine to Sit 4  Minimal assistance   Additional items Assist x 1;HOB elevated;Increased time required;LE management   Transfers   Sit to Stand 4  Minimal assistance   Additional items Assist x 2;Increased time required;Verbal cues   Stand to Sit 4  Minimal assistance   Additional items Assist x 2;Increased time required;Verbal cues   Stand pivot 3  Moderate assistance   Additional items Assist x 2;Armrests;Increased time required;Verbal cues  (VCs for safety)   Ambulation/Elevation   Gait pattern Improper Weight shift;Antalgic;Narrow GORGE;Decreased foot clearance;Decreased R " stance;Step to;Excessively slow   Gait Assistance 3  Moderate assist   Additional items Assist x 2;Verbal cues;Tactile cues   Assistive Device   (HHA)   Distance 3 ft, 6 ft   Stair Management Assistance Not tested   Balance   Static Sitting Fair +   Dynamic Sitting Fair -   Static Standing Poor +   Dynamic Standing Poor   Ambulatory Poor   Endurance Deficit   Endurance Deficit Yes   Activity Tolerance   Activity Tolerance Patient limited by fatigue;Patient limited by pain;Treatment limited secondary to medical complications (Comment)  (cognition)   Medical Staff Made Aware coordination of care provided with OT Riri   Nurse Made Aware Yes, RN John made aware of outcomes/recs   Exercises   Knee AROM Long Arc Quad Sitting;15 reps;AROM;Right;Left   Ankle Pumps Sitting;10 reps;AROM;Right;Left   Marching Sitting;15 reps;AROM;Right;Left   Assessment   Prognosis Fair   Problem List Decreased strength;Decreased endurance;Impaired balance;Decreased mobility;Decreased coordination;Decreased cognition;Impaired judgement;Decreased safety awareness;Orthopedic restrictions;Pain   Assessment Pt seen for PT treatment session this date, consisting of ther act focused on bed mobility & functional transfer training, ther ex focused on strengthening, and gt training on level surfaces to improve pt safety in household environment, neuro re-ed focused on improving postural control and static/dynamic sitting balance on various surfaces. Since previous session, pt has made good progress in terms of improved tolerance to further ambulation attempt with ability to amb x 6' with mod Ax2 HHA. Pertinent barriers during this session include cognitive status and awareness. Current goals and POC established on IE remain appropriate, pt continues to have rehab potential and is making fair progress towards STGs. Pt prognosis for achieving goals is fair, pending pt progress with hospitalization/medical status improvements, and indicated by recent  onset, eye contact, and previous response to intervention. Pt continues to be functioning below baseline level, and remains limited 2* factors listed above. PT will continue to see pt during current hospitalization in order to address the deficits listed above and provide interventions consistent w/ POC in effort to achieve STGs. PT recommends level 2, moderate resource intensity upon discharge.   Barriers to Discharge Inaccessible home environment   Goals   Patient Goals none expressed by pt d/t baseline cognitive status   STG Expiration Date 02/15/24   Short Term Goal #1 goals remain appropriate   PT Treatment Day 3   Plan   Treatment/Interventions Functional transfer training;LE strengthening/ROM;Therapeutic exercise;Endurance training;Cognitive reorientation;Patient/family training;Equipment eval/education;Bed mobility;Gait training;Spoke to nursing;Spoke to case management;OT   Progress Slow progress, decreased activity tolerance   PT Frequency 3-5x/wk   Discharge Recommendation   Rehab Resource Intensity Level, PT II (Moderate Resource Intensity)   Equipment Recommended Walker   Walker Package Recommended Wheeled walker   Additional Comments Pt's raw score on the -[facility] Basic Mobility inpatient short form is 13, standardized score is 33.99. Patients at this level are likely to benefit from DC to post acute rehabilitation services, however, please refer to therapist recommendation for safe DC planning.   -[facility] Basic Mobility Inpatient   Turning in Flat Bed Without Bedrails 3   Lying on Back to Sitting on Edge of Flat Bed Without Bedrails 3   Moving Bed to Chair 2   Standing Up From Chair Using Arms 2   Walk in Room 2   Climb 3-5 Stairs With Railing 1   Basic Mobility Inpatient Raw Score 13   Basic Mobility Standardized Score 33.99   Highest Level Of Mobility   -HLM Goal 4: Move to chair/commode   JH-HLM Achieved 6: Walk 10 steps or more   Education   Education Provided Mobility training;Assistive device    Patient Demonstrates verbal understanding;Reinforcement needed   End of Consult   Patient Position at End of Consult Bedside chair;Bed/Chair alarm activated;All needs within reach       Annabel Dorado PT, DPT    Time of PT treatment session: 1246-0334 (total treatment time = 54 minutes)

## 2024-02-13 ENCOUNTER — APPOINTMENT (OUTPATIENT)
Dept: OCCUPATIONAL THERAPY | Facility: CLINIC | Age: 59
End: 2024-02-13
Payer: MEDICARE

## 2024-02-13 LAB
ANION GAP SERPL CALCULATED.3IONS-SCNC: 7 MMOL/L
BUN SERPL-MCNC: 24 MG/DL (ref 5–25)
CALCIUM SERPL-MCNC: 9.4 MG/DL (ref 8.4–10.2)
CHLORIDE SERPL-SCNC: 104 MMOL/L (ref 96–108)
CO2 SERPL-SCNC: 29 MMOL/L (ref 21–32)
CREAT SERPL-MCNC: 0.69 MG/DL (ref 0.6–1.3)
ERYTHROCYTE [DISTWIDTH] IN BLOOD BY AUTOMATED COUNT: 13.4 % (ref 11.6–15.1)
GFR SERPL CREATININE-BSD FRML MDRD: 106 ML/MIN/1.73SQ M
GLUCOSE SERPL-MCNC: 108 MG/DL (ref 65–140)
GLUCOSE SERPL-MCNC: 121 MG/DL (ref 65–140)
GLUCOSE SERPL-MCNC: 125 MG/DL (ref 65–140)
GLUCOSE SERPL-MCNC: 145 MG/DL (ref 65–140)
GLUCOSE SERPL-MCNC: 147 MG/DL (ref 65–140)
GLUCOSE SERPL-MCNC: 149 MG/DL (ref 65–140)
GLUCOSE SERPL-MCNC: 244 MG/DL (ref 65–140)
GLUCOSE SERPL-MCNC: 340 MG/DL (ref 65–140)
GLUCOSE SERPL-MCNC: 480 MG/DL (ref 65–140)
HCT VFR BLD AUTO: 37.1 % (ref 36.5–49.3)
HGB BLD-MCNC: 12.6 G/DL (ref 12–17)
MCH RBC QN AUTO: 33.4 PG (ref 26.8–34.3)
MCHC RBC AUTO-ENTMCNC: 34 G/DL (ref 31.4–37.4)
MCV RBC AUTO: 98 FL (ref 82–98)
PLATELET # BLD AUTO: 178 THOUSANDS/UL (ref 149–390)
PMV BLD AUTO: 11.2 FL (ref 8.9–12.7)
POTASSIUM SERPL-SCNC: 4.3 MMOL/L (ref 3.5–5.3)
RBC # BLD AUTO: 3.77 MILLION/UL (ref 3.88–5.62)
SODIUM SERPL-SCNC: 140 MMOL/L (ref 135–147)
WBC # BLD AUTO: 8.95 THOUSAND/UL (ref 4.31–10.16)

## 2024-02-13 PROCEDURE — 97530 THERAPEUTIC ACTIVITIES: CPT

## 2024-02-13 PROCEDURE — 85027 COMPLETE CBC AUTOMATED: CPT | Performed by: INTERNAL MEDICINE

## 2024-02-13 PROCEDURE — 97116 GAIT TRAINING THERAPY: CPT

## 2024-02-13 PROCEDURE — 97112 NEUROMUSCULAR REEDUCATION: CPT

## 2024-02-13 PROCEDURE — 80048 BASIC METABOLIC PNL TOTAL CA: CPT | Performed by: INTERNAL MEDICINE

## 2024-02-13 PROCEDURE — 99232 SBSQ HOSP IP/OBS MODERATE 35: CPT | Performed by: PHYSICIAN ASSISTANT

## 2024-02-13 PROCEDURE — 82948 REAGENT STRIP/BLOOD GLUCOSE: CPT

## 2024-02-13 PROCEDURE — 97110 THERAPEUTIC EXERCISES: CPT

## 2024-02-13 RX ADMIN — GABAPENTIN 300 MG: 300 CAPSULE ORAL at 16:52

## 2024-02-13 RX ADMIN — QUETIAPINE 400 MG: 400 TABLET, FILM COATED, EXTENDED RELEASE ORAL at 22:13

## 2024-02-13 RX ADMIN — LACOSAMIDE 150 MG: 150 TABLET ORAL at 08:25

## 2024-02-13 RX ADMIN — CALCIUM 1 TABLET: 500 TABLET ORAL at 16:52

## 2024-02-13 RX ADMIN — LEVETIRACETAM 1500 MG: 500 TABLET, FILM COATED ORAL at 22:12

## 2024-02-13 RX ADMIN — ZONISAMIDE 300 MG: 100 CAPSULE ORAL at 22:13

## 2024-02-13 RX ADMIN — CALCIUM 1 TABLET: 500 TABLET ORAL at 22:12

## 2024-02-13 RX ADMIN — LEVETIRACETAM 1500 MG: 500 TABLET, FILM COATED ORAL at 08:27

## 2024-02-13 RX ADMIN — CHOLECALCIFEROL TAB 25 MCG (1000 UNIT) 1000 UNITS: 25 TAB at 08:26

## 2024-02-13 RX ADMIN — HYDROXYZINE HYDROCHLORIDE 25 MG: 50 TABLET, FILM COATED ORAL at 14:16

## 2024-02-13 RX ADMIN — DICLOFENAC SODIUM 2 G: 10 GEL TOPICAL at 22:14

## 2024-02-13 RX ADMIN — HEPARIN SODIUM 5000 UNITS: 5000 INJECTION INTRAVENOUS; SUBCUTANEOUS at 14:18

## 2024-02-13 RX ADMIN — DICLOFENAC SODIUM 2 G: 10 GEL TOPICAL at 17:19

## 2024-02-13 RX ADMIN — DOCUSATE SODIUM 100 MG: 100 CAPSULE, LIQUID FILLED ORAL at 17:17

## 2024-02-13 RX ADMIN — HYDROXYZINE HYDROCHLORIDE 25 MG: 50 TABLET, FILM COATED ORAL at 08:26

## 2024-02-13 RX ADMIN — QUETIAPINE 200 MG: 400 TABLET, FILM COATED, EXTENDED RELEASE ORAL at 08:26

## 2024-02-13 RX ADMIN — CALCIUM 1 TABLET: 500 TABLET ORAL at 08:26

## 2024-02-13 RX ADMIN — LACOSAMIDE 200 MG: 150 TABLET ORAL at 22:14

## 2024-02-13 RX ADMIN — HEPARIN SODIUM 5000 UNITS: 5000 INJECTION INTRAVENOUS; SUBCUTANEOUS at 06:07

## 2024-02-13 RX ADMIN — BENZTROPINE MESYLATE 0.5 MG: 0.5 TABLET ORAL at 08:25

## 2024-02-13 RX ADMIN — QUETIAPINE 200 MG: 400 TABLET, FILM COATED, EXTENDED RELEASE ORAL at 14:15

## 2024-02-13 RX ADMIN — DOCUSATE SODIUM 100 MG: 100 CAPSULE, LIQUID FILLED ORAL at 08:25

## 2024-02-13 RX ADMIN — GABAPENTIN 300 MG: 300 CAPSULE ORAL at 08:25

## 2024-02-13 RX ADMIN — PRAVASTATIN SODIUM 80 MG: 40 TABLET ORAL at 16:52

## 2024-02-13 RX ADMIN — DICLOFENAC SODIUM 2 G: 10 GEL TOPICAL at 08:35

## 2024-02-13 RX ADMIN — TEMAZEPAM 15 MG: 7.5 CAPSULE ORAL at 22:35

## 2024-02-13 RX ADMIN — DIVALPROEX SODIUM 1000 MG: 500 TABLET, DELAYED RELEASE ORAL at 22:12

## 2024-02-13 RX ADMIN — BENZTROPINE MESYLATE 0.5 MG: 0.5 TABLET ORAL at 17:17

## 2024-02-13 RX ADMIN — B-COMPLEX W/ C & FOLIC ACID TAB 1 TABLET: TAB at 08:25

## 2024-02-13 RX ADMIN — CYANOCOBALAMIN TAB 500 MCG 1000 MCG: 500 TAB at 08:27

## 2024-02-13 RX ADMIN — DIVALPROEX SODIUM 750 MG: 250 TABLET, DELAYED RELEASE ORAL at 08:26

## 2024-02-13 RX ADMIN — HEPARIN SODIUM 5000 UNITS: 5000 INJECTION INTRAVENOUS; SUBCUTANEOUS at 22:14

## 2024-02-13 RX ADMIN — GABAPENTIN 300 MG: 300 CAPSULE ORAL at 22:13

## 2024-02-13 RX ADMIN — LORATADINE 10 MG: 10 TABLET ORAL at 08:26

## 2024-02-13 RX ADMIN — DICLOFENAC SODIUM 2 G: 10 GEL TOPICAL at 11:13

## 2024-02-13 NOTE — ASSESSMENT & PLAN NOTE
Status post a mechanical fall prior to arrival   CT right lower extremity imaging confirmed a comminuted and nondisplaced fractures of the right greater trochanter with no evidence of femoral neck fracture.   Orthopedic surgery consultation appreciated- No indication at this time for an acute phase inpatient surgical intervention.   Weightbearing as tolerated  Continue current pain medication management regimen  Status post a PT and OT evaluation, they recommend postacute rehabilitation services  Status post a psychiatric evaluation-the patient has been cleared from a psych standpoint for rehab at a facility  Case management is working on placement  Patient medically stable for discharge

## 2024-02-13 NOTE — ASSESSMENT & PLAN NOTE
With impulsive behavior at times  Continue Seroquel 200 mg at 8 AM and 2 PM and 400 mg at bedtime  Continue hydroxyzine 25 mg twice daily  Continue benztropine 0.5 mg twice daily  Supportive care  Placed on continual observation 2/12- will continue for now

## 2024-02-13 NOTE — PROGRESS NOTES
[facility]  Progress Note  Name: Jairon Oconnell I  MRN: [id number]  Unit/Bed#: -01 I Date of Admission: 2/4/2024   Date of Service: 2/13/2024 I Hospital Day: 9    Assessment/Plan   * Nondisplaced fracture of greater trochanter of right femur, initial encounter for closed fracture (HCC)  Assessment & Plan  Status post a mechanical fall prior to arrival   CT right lower extremity imaging confirmed a comminuted and nondisplaced fractures of the right greater trochanter with no evidence of femoral neck fracture.   Orthopedic surgery consultation appreciated- No indication at this time for an acute phase inpatient surgical intervention.   Weightbearing as tolerated  Continue current pain medication management regimen  Status post a PT and OT evaluation, they recommend postacute rehabilitation services  Status post a psychiatric evaluation-the patient has been cleared from a psych standpoint for rehab at a facility  Case management is working on placement  Patient medically stable for discharge    Behavior concern in adult  Assessment & Plan  With impulsive behavior at times  Continue Seroquel 200 mg at 8 AM and 2 PM and 400 mg at bedtime  Continue hydroxyzine 25 mg twice daily  Continue benztropine 0.5 mg twice daily  Supportive care  Placed on continual observation 2/12- will continue for now    Acquired hypothyroidism  Assessment & Plan  Continue levothyroxine 150 mcg daily    Essential hypertension  Assessment & Plan  Blood pressure stable  Continue lisinopril    Hyperlipidemia  Assessment & Plan  Continue pravastatin    Generalized convulsive epilepsy (HCC)  Assessment & Plan  Currently controlled  Continue Depakote 750 mg in the morning and at 1000 mg at bedtime, Vimpat 150 mg in the morning and 200 mg at bedtime, Keppra 750 mg BID, and zonisamide 300 mg at bedtime      Type 2 diabetes mellitus with diabetic neuropathy, without long-term current use of insulin (HCC)  Assessment &  Plan  Lab Results   Component Value Date    HGBA1C 5.5 11/27/2023       Recent Labs     02/13/24  1102 02/13/24  1104 02/13/24  1108 02/13/24  1110   POCGLU 244* 480* 125 145*         Blood Sugar Average: Last 72 hrs:  (P) 168.5  Oral hypoglycemic medications remain on hold  Target blood sugar for the hospital is 140-180  Continue Accu-Cheks before meals and at bedtime with sliding scale insulin coverage  Diabetic neuropathy-continue gabapentin    Cerebral palsy (HCC)  Assessment & Plan  He lives in a group home and has caregiver  Continue supportive care         VTE Pharmacologic Prophylaxis: VTE Score: 6 High Risk (Score >/= 5) - Pharmacological DVT Prophylaxis Ordered: heparin. Sequential Compression Devices Ordered.    Mobility:   Basic Mobility Inpatient Raw Score: 13  JH-HLM Goal: 4: Move to chair/commode  JH-HLM Achieved: 4: Move to chair/commode  HLM Goal achieved. Continue to encourage appropriate mobility.    Patient Centered Rounds: I performed bedside rounds with nursing staff today.   Discussions with Specialists or Other Care Team Provider: nursing, CM    Education and Discussions with Family / Patient: Updated  (sister) via phone.    Total Time Spent on Date of Encounter in care of patient: 25 mins. This time was spent on one or more of the following: performing physical exam; counseling and coordination of care; obtaining or reviewing history; documenting in the medical record; reviewing/ordering tests, medications or procedures; communicating with other healthcare professionals and discussing with patient's family/caregivers.    Current Length of Stay: 9 day(s)  Current Patient Status: Inpatient   Certification Statement: The patient will continue to require additional inpatient hospital stay due to need for placement to STR  Discharge Plan:  patient medically stable for discharge- awaiting placement to STR    Code Status: Level 1 - Full Code    Subjective:   The patient was seen and  examined. The patient was lying in bed in no acute distress. The patient is sleeping but awakes easily. He pushed my had away when examining.     Objective:     Vitals:   Temp (24hrs), Av.9 °F (36.6 °C), Min:97.7 °F (36.5 °C), Max:98.1 °F (36.7 °C)    Temp:  [97.7 °F (36.5 °C)-98.1 °F (36.7 °C)] 97.7 °F (36.5 °C)  HR:  [79-86] 79  Resp:  [18] 18  BP: (110-136)/(63-83) 110/63  SpO2:  [95 %-98 %] 95 %  Body mass index is 21.14 kg/m².     Input and Output Summary (last 24 hours):     Intake/Output Summary (Last 24 hours) at 2024 1254  Last data filed at 2024 0924  Gross per 24 hour   Intake 240 ml   Output 650 ml   Net -410 ml       Physical Exam:   Physical Exam  Vitals and nursing note reviewed.   Constitutional:       General: He is sleeping.      Appearance: Normal appearance.   HENT:      Head: Normocephalic and atraumatic.   Cardiovascular:      Rate and Rhythm: Normal rate and regular rhythm.      Heart sounds: Normal heart sounds.   Pulmonary:      Effort: Pulmonary effort is normal.      Breath sounds: Normal breath sounds.   Abdominal:      Palpations: Abdomen is soft.      Tenderness: There is no abdominal tenderness.   Skin:     General: Skin is warm and dry.   Neurological:      General: No focal deficit present.      Mental Status: He is easily aroused. Mental status is at baseline.   Psychiatric:         Attention and Perception: Attention normal.         Speech: He is noncommunicative.      Comments: Patient with intermittent agitation           Additional Data:     Labs:  Results from last 7 days   Lab Units 24  0606 02/10/24  0614   WBC Thousand/uL 8.95 7.27   HEMOGLOBIN g/dL 12.6 11.6*   HEMATOCRIT % 37.1 34.8*   PLATELETS Thousands/uL 178 147*   NEUTROS PCT %  --  32*   LYMPHS PCT %  --  47*   MONOS PCT %  --  14*   EOS PCT %  --  5     Results from last 7 days   Lab Units 24  0606   SODIUM mmol/L 140   POTASSIUM mmol/L 4.3   CHLORIDE mmol/L 104   CO2 mmol/L 29   BUN mg/dL  24   CREATININE mg/dL 0.69   ANION GAP mmol/L 7   CALCIUM mg/dL 9.4   GLUCOSE RANDOM mg/dL 121         Results from last 7 days   Lab Units 02/13/24  1110 02/13/24  1108 02/13/24  1104 02/13/24  1102 02/13/24  1100 02/13/24  0656 02/12/24  2116 02/12/24  1601 02/12/24  1056 02/12/24  0648 02/11/24  2139 02/11/24  1554   POC GLUCOSE mg/dl 145* 125 480* 244* 340* 108 142* 140 113 122 110 195*               Lines/Drains:  Invasive Devices       Peripheral Intravenous Line  Duration             Peripheral IV 02/09/24 Dorsal (posterior);Left Hand 3 days    Peripheral IV 02/13/24 Dorsal (posterior);Right Hand <1 day                          Imaging: No pertinent imaging reviewed.    Recent Cultures (last 7 days):         Last 24 Hours Medication List:   Current Facility-Administered Medications   Medication Dose Route Frequency Provider Last Rate    acetaminophen  650 mg Oral Q6H PRN Ashley Good, CRNP      benztropine  0.5 mg Oral BID Ashley Good, CRNP      calcium carbonate  1 tablet Oral TID Ashley Good, CRNP      cholecalciferol  1,000 Units Oral Daily Ashley Good, CRNP      vitamin B-12  1,000 mcg Oral Every Other Day Ashley Good, CRNP      Diclofenac Sodium  2 g Topical 4x Daily Jose Angel Carpenter PA-C      divalproex sodium  1,000 mg Oral HS Ashley Good, CRNP      divalproex sodium  750 mg Oral Daily Ashley Good, CRNP      docusate sodium  100 mg Oral BID Ashley Good, CRNP      gabapentin  300 mg Oral TID Ashley Good, CRNP      heparin (porcine)  5,000 Units Subcutaneous Q8H RONALD Ashley Good, CRNP      hydrOXYzine HCL  25 mg Oral BID Ashley Good, CRNP      insulin lispro  1-5 Units Subcutaneous TID AC Ashley Good, CRNP      insulin lispro  1-5 Units Subcutaneous HS Ashley Good, CRNP      lacosamide  150 mg Oral Daily Ashley Good, CRNP      lacosamide  200 mg Oral HS Ashley M Florentino, CRNP      levETIRAcetam  1,500 mg Oral Q12H [facility]       levothyroxine  150 mcg Oral Early Morning Ashley Good, CRNP      lisinopril  10 mg Oral Daily Ashley Good, CRNP      loratadine  10 mg Oral Daily Ashley Good, CRNP      multivitamin stress formula  1 tablet Oral Daily Ashley Good, CRNP      pravastatin  80 mg Oral Daily With Dinner Ashley Good, CRNP      QUEtiapine  200 mg Oral BID Ashley Good, CRNP      QUEtiapine  400 mg Oral HS Ashley Good, CRNP      temazepam  15 mg Oral HS PRN Ashley Good, CRNP      zonisamide  300 mg Oral HS Ashley Good, CRNP          Today, Patient Was Seen By: Kolby Galeas PA-C    **Please Note: This note may have been constructed using a voice recognition system.**

## 2024-02-13 NOTE — PLAN OF CARE
Problem: PHYSICAL THERAPY ADULT  Goal: Performs mobility at highest level of function for planned discharge setting.  See evaluation for individualized goals.  Description: Treatment/Interventions: Functional transfer training, LE strengthening/ROM, Therapeutic exercise, Endurance training, Cognitive reorientation, Patient/family training, Equipment eval/education, Bed mobility, Gait training, Spoke to nursing, Spoke to case management, OT  Equipment Recommended:  (continue TBD pending progress, RW at this time)       See flowsheet documentation for full assessment, interventions and recommendations.  Outcome: Progressing  Note: Prognosis: Fair  Problem List: Decreased strength, Decreased endurance, Impaired balance, Decreased mobility, Decreased coordination, Decreased cognition, Impaired judgement, Decreased safety awareness, Orthopedic restrictions, Pain  Assessment: Pt seen for PT treatment session this date with interventions consisting of transfer training, gait training, seated TE, neuromuscular re-education of movement performed to improve dynamic sitting balance, bed mobility tasks, and education provided as needed for safety and direction to improve functional mobility, safety awareness, and activity tolerance. Pt agreeable to PT treatment session upon arrival, pt found sitting OOB in recliner. At end of session, pt left in bed positioned for comfort, bed alarm activated, and with all needs in reach. In comparison to previous session, pt with improvements in ambulation distance . Continue to recommend Level II (Moderate Resource Intensity at time of d/c in order to maximize pt's functional independence and safety w/ mobility. Pt continues to be functioning below baseline level. PT will continue to see pt while here in order to address the deficits listed above and provide interventions consistent w/ POC in effort to achieve STGs.  Barriers to Discharge: Inaccessible home environment     Rehab Resource  Intensity Level, PT: II (Moderate Resource Intensity)    See flowsheet documentation for full assessment.

## 2024-02-13 NOTE — PLAN OF CARE
Problem: Potential for Falls  Goal: Patient will remain free of falls  Description: INTERVENTIONS:  - Educate patient/family on patient safety including physical limitations  - Instruct patient to call for assistance with activity   - Consult OT/PT to assist with strengthening/mobility   - Keep Call bell within reach  - Keep bed low and locked with side rails adjusted as appropriate  - Keep care items and personal belongings within reach  - Initiate and maintain comfort rounds  - Make Fall Risk Sign visible to staff  - Offer Toileting every 2 Hours, in advance of need  - Initiate/Maintain alarms  - Obtain necessary fall risk management equipment:   - Apply yellow socks and bracelet for high fall risk patients  - Consider moving patient to room near nurses station  Outcome: Progressing     Problem: SAFETY ADULT  Goal: Maintain or return to baseline ADL function  Description: INTERVENTIONS:  -  Assess patient's ability to carry out ADLs; assess patient's baseline for ADL function and identify physical deficits which impact ability to perform ADLs (bathing, care of mouth/teeth, toileting, grooming, dressing, etc.)  - Assess/evaluate cause of self-care deficits   - Assess range of motion  - Assess patient's mobility; develop plan if impaired  - Assess patient's need for assistive devices and provide as appropriate  - Encourage maximum independence but intervene and supervise when necessary  - Involve family in performance of ADLs  - Assess for home care needs following discharge   - Consider OT consult to assist with ADL evaluation and planning for discharge  - Provide patient education as appropriate  Outcome: Progressing  Goal: Maintains/Returns to pre admission functional level  Description: INTERVENTIONS:  - Perform AM-PAC 6 Click Basic Mobility/ Daily Activity assessment daily.  - Set and communicate daily mobility goal to care team and patient/family/caregiver.   - Collaborate with rehabilitation services on  mobility goals if consulted  - Reposition patient every 2 hours.  - Out of bed for toileting/meals  - Record patient progress and toleration of activity level   Outcome: Progressing

## 2024-02-13 NOTE — PHYSICAL THERAPY NOTE
"   PHYSICAL THERAPY TREATMENT NOTE  NAME:  Jairon Oconnell  DATE: 02/13/24    Length Of Stay: 9  Performed at least 2 patient identifiers during session: Name and ID bracelet    TREATMENT FLOWSHEET:    02/13/24 1230   PT Last Visit   PT Visit Date 02/13/24   Note Type   Note Type Treatment   Pain Assessment   Pain Assessment Tool Blanchard-Baker FACES   Pain Score 7   Pain Location/Orientation Orientation: Right;Location: Hip   Pain Onset/Description Onset: Ongoing;Frequency: Intermittent;Descriptor: Discomfort;Descriptor: Sore   Patient's Stated Pain Goal No pain   Hospital Pain Intervention(s) Cold applied;Ambulation/increased activity;Repositioned   Restrictions/Precautions   Weight Bearing Precautions Per Order Yes   RLE Weight Bearing Per Order WBAT   Other Precautions Cognitive;Chair Alarm;Bed Alarm;Fall Risk;Pain   General   Chart Reviewed Yes   Response to Previous Treatment Patient unable to report, no changes reported from family or staff   Family/Caregiver Present No   Cognition   Overall Cognitive Status Impaired   Arousal/Participation Alert;Responsive   Attention Difficulty attending to directions   Orientation Level Oriented to person;Oriented to place   Memory Unable to assess   Following Commands Follows one step commands with increased time or repetition   Subjective   Subjective \"Help me.\"   Bed Mobility   Sit to Supine 3  Moderate assistance   Additional items Assist x 2;HOB elevated;Increased time required;Verbal cues;LE management  (UB management)   Additional Comments verbal and tactile cues for safety and direction   Transfers   Sit to Stand 4  Minimal assistance   Additional items Assist x 2;Armrests;Increased time required;Verbal cues  (RW)   Stand to Sit 4  Minimal assistance   Additional items Assist x 2;Increased time required;Verbal cues   Stand pivot 3  Moderate assistance   Additional items Assist x 2;Armrests;Increased time required;Verbal cues  (RW)   Additional Comments VC's provided " for for proper hand placement during transitions   Ambulation/Elevation   Gait pattern Improper Weight shift;Decreased foot clearance;Forward Flexion;Antalgic;Decreased R stance;Short stride;Step to;Excessively slow;Decreased heel strike;Decreased toe off;Decreased hip extension   Gait Assistance 3  Moderate assist   Additional items Assist x 2;Verbal cues;Tactile cues   Assistive Device Rolling walker   Distance 15 ft   Stair Management Assistance Not tested   Balance   Static Sitting Fair +   Dynamic Sitting Fair -   Static Standing Poor +   Dynamic Standing Poor   Ambulatory Poor   Endurance Deficit   Endurance Deficit Yes   Activity Tolerance   Activity Tolerance Patient limited by pain;Patient limited by fatigue;Other (Comment)  (cognition)   Medical Staff Made Aware PCA aware   Nurse Made Aware RN aware   Exercises   Hip Adduction Sitting;10 reps;AROM;Bilateral   Knee AROM Long Arc Quad Sitting;15 reps;AROM;Bilateral   Ankle Pumps Sitting;15 reps;AROM;Bilateral   Marching Sitting;15 reps;AROM;Bilateral   Neuro re-ed Patient performed dynamic sitting balance activities while sitting at EOB supported by UE on BSR with close S/CGA  including multidirectional weight shifting, reaching, and scooting.   Assessment   Prognosis Fair   Problem List Decreased strength;Decreased endurance;Impaired balance;Decreased mobility;Decreased coordination;Decreased cognition;Impaired judgement;Decreased safety awareness;Orthopedic restrictions;Pain   Goals   Patient Goals none verbalized   PT Treatment Day 4   Plan   Treatment/Interventions Functional transfer training;LE strengthening/ROM;Therapeutic exercise;Endurance training;Patient/family training;Cognitive reorientation;Equipment eval/education;Gait training;Bed mobility;Compensatory technique education;Spoke to nursing   Progress Slow progress, decreased activity tolerance   PT Frequency 3-5x/wk   Discharge Recommendation   Rehab Resource Intensity Level, PT II (Moderate  Resource Intensity)   AM-PAC Basic Mobility Inpatient   Turning in Flat Bed Without Bedrails 3   Lying on Back to Sitting on Edge of Flat Bed Without Bedrails 3   Moving Bed to Chair 1   Standing Up From Chair Using Arms 2   Walk in Room 1   Climb 3-5 Stairs With Railing 1   Basic Mobility Inpatient Raw Score 11   Basic Mobility Standardized Score 30.25   Highest Level Of Mobility   -HL Goal 4: Move to chair/commode   JH-HLM Achieved 6: Walk 10 steps or more       The patient's AM-PAC Basic Mobility Inpatient Short Form Raw Score is 11. A raw score less than 16 suggests the patient may benefit from discharge to post-acute rehabilitation services. Please also refer to the recommendation of the Physical Therapist for safe discharge planning.    Pt seen for PT treatment session this date with interventions consisting of transfer training, gait training, seated TE, neuromuscular re-education of movement performed to improve dynamic sitting balance, bed mobility tasks, and education provided as needed for safety and direction to improve functional mobility, safety awareness, and activity tolerance. Pt agreeable to PT treatment session upon arrival, pt found sitting OOB in recliner. At end of session, pt left in bed positioned for comfort, bed alarm activated, and with all needs in reach. In comparison to previous session, pt with improvements in ambulation distance . Continue to recommend Level II (Moderate Resource Intensity at time of d/c in order to maximize pt's functional independence and safety w/ mobility. Pt continues to be functioning below baseline level. PT will continue to see pt while here in order to address the deficits listed above and provide interventions consistent w/ POC in effort to achieve STGs.    Elli Knowles, PTA

## 2024-02-13 NOTE — PLAN OF CARE
Problem: Potential for Falls  Goal: Patient will remain free of falls  Description: INTERVENTIONS:  - Educate patient/family on patient safety including physical limitations  - Instruct patient to call for assistance with activity   - Consult OT/PT to assist with strengthening/mobility   - Keep Call bell within reach  - Keep bed low and locked with side rails adjusted as appropriate  - Keep care items and personal belongings within reach  - Initiate and maintain comfort rounds  - Make Fall Risk Sign visible to staff  - Offer Toileting every 2 Hours, in advance of need  - Initiate/Maintain alarms  - Obtain necessary fall risk management equipment:   - Apply yellow socks and bracelet for high fall risk patients  - Consider moving patient to room near nurses station  Outcome: Progressing     Problem: Prexisting or High Potential for Compromised Skin Integrity  Goal: Skin integrity is maintained or improved  Description: INTERVENTIONS:  - Identify patients at risk for skin breakdown  - Assess and monitor skin integrity  - Assess and monitor nutrition and hydration status  - Monitor labs   - Assess for incontinence   - Turn and reposition patient  - Assist with mobility/ambulation  - Relieve pressure over bony prominences  - Avoid friction and shearing  - Provide appropriate hygiene as needed including keeping skin clean and dry  - Evaluate need for skin moisturizer/barrier cream  - Collaborate with interdisciplinary team   - Patient/family teaching  - Consider wound care consult   Outcome: Progressing     Problem: PAIN - ADULT  Goal: Verbalizes/displays adequate comfort level or baseline comfort level  Description: Interventions:  - Encourage patient to monitor pain and request assistance  - Assess pain using appropriate pain scale  - Administer analgesics based on type and severity of pain and evaluate response  - Implement non-pharmacological measures as appropriate and evaluate response  - Consider cultural and  social influences on pain and pain management  - Notify physician/advanced practitioner if interventions unsuccessful or patient reports new pain  Outcome: Progressing     Problem: INFECTION - ADULT  Goal: Absence or prevention of progression during hospitalization  Description: INTERVENTIONS:  - Assess and monitor for signs and symptoms of infection  - Monitor lab/diagnostic results  - Monitor all insertion sites, i.e. indwelling lines, tubes, and drains  - Monitor endotracheal if appropriate and nasal secretions for changes in amount and color  - [place] appropriate cooling/warming therapies per order  - Administer medications as ordered  - Instruct and encourage patient and family to use good hand hygiene technique  - Identify and instruct in appropriate isolation precautions for identified infection/condition  Outcome: Progressing     Problem: SAFETY ADULT  Goal: Maintain or return to baseline ADL function  Description: INTERVENTIONS:  -  Assess patient's ability to carry out ADLs; assess patient's baseline for ADL function and identify physical deficits which impact ability to perform ADLs (bathing, care of mouth/teeth, toileting, grooming, dressing, etc.)  - Assess/evaluate cause of self-care deficits   - Assess range of motion  - Assess patient's mobility; develop plan if impaired  - Assess patient's need for assistive devices and provide as appropriate  - Encourage maximum independence but intervene and supervise when necessary  - Involve family in performance of ADLs  - Assess for home care needs following discharge   - Consider OT consult to assist with ADL evaluation and planning for discharge  - Provide patient education as appropriate  Outcome: Progressing  Goal: Maintains/Returns to pre admission functional level  Description: INTERVENTIONS:  - Perform AM-PAC 6 Click Basic Mobility/ Daily Activity assessment daily.  - Set and communicate daily mobility goal to care team and patient/family/caregiver.    - Collaborate with rehabilitation services on mobility goals if consulted  - Reposition patient every 2 hours.  - Out of bed for toileting/meals  - Record patient progress and toleration of activity level   Outcome: Progressing     Problem: DISCHARGE PLANNING  Goal: Discharge to home or other facility with appropriate resources  Description: INTERVENTIONS:  - Identify barriers to discharge w/patient and caregiver  - Arrange for needed discharge resources and transportation as appropriate  - Identify discharge learning needs (meds, wound care, etc.)  - Refer to Case Management Department for coordinating discharge planning if the patient needs post-hospital services based on physician/advanced practitioner order or complex needs related to functional status, cognitive ability, or social support system  Outcome: Progressing     Problem: Knowledge Deficit  Goal: Patient/family/caregiver demonstrates understanding of disease process, treatment plan, medications, and discharge instructions  Description: Complete learning assessment and assess knowledge base.  Interventions:  - Provide teaching at level of understanding  - Provide teaching via preferred learning methods  Outcome: Progressing     Problem: METABOLIC, FLUID AND ELECTROLYTES - ADULT  Goal: Glucose maintained within target range  Description: INTERVENTIONS:  - Monitor Blood Glucose as ordered  - Assess for signs and symptoms of hyperglycemia and hypoglycemia  - Administer ordered medications to maintain glucose within target range  - Assess nutritional intake and initiate nutrition service referral as needed  Outcome: Progressing     Problem: SKIN/TISSUE INTEGRITY - ADULT  Goal: Skin Integrity remains intact(Skin Breakdown Prevention)  Description: Assess:  -Perform Nolan assessment every shift  -Clean and moisturize skin every shift  -Inspect skin when repositioning, toileting, and assisting with ADLS  -Assess under medical devices such as jacy every  shift  -Assess extremities for adequate circulation and sensation     Bed Management:  -Have minimal linens on bed & keep smooth, unwrinkled  -Change linens as needed when moist or perspiring  -Avoid sitting or lying in one position for more than 2 hours while in bed  -Keep HOB at 20 degrees     Toileting:  -Offer bedside commode  -Assess for incontinence every 2 hours  -Use incontinent care products after each incontinent episode such as hydraguard    Activity:  -Mobilize patient 3 times a day  -Encourage activity and walks on unit  -Encourage or provide ROM exercises   -Turn and reposition patient every 2 Hours  -Use appropriate equipment to lift or move patient in bed  -Instruct/ Assist with weight shifting every 30 min when out of bed in chair  -Consider limitation of chair time 2 hour intervals    Skin Care:  -Avoid use of baby powder, tape, friction and shearing, hot water or constrictive clothing  -Relieve pressure over bony prominences using hydraguard  -Do not massage red bony areas    Next Steps:  -Teach patient strategies to minimize risks such as frequent repositioning   -Consider consults to  interdisciplinary teams such as wound care  Outcome: Progressing  Goal: Pressure injury heals and does not worsen  Description: Interventions:  - Implement low air loss mattress or specialty surface (Criteria met)  - Apply silicone foam dressing  - Instruct/assist with weight shifting every 30 min minutes when in chair   - Limit chair time to 2 hour intervals  - Use special pressure reducing interventions such as cushion when in chair   - Apply fecal or urinary incontinence containment device   - Perform passive or active ROM every shift  - Turn and reposition patient & offload bony prominences every 2 hours   - Utilize friction reducing device or surface for transfers   - Consider consults to  interdisciplinary teams such as wound care  - Use incontinent care products after each incontinent episode such as  hydraguard  - Consider nutrition services referral as needed  Outcome: Progressing     Problem: MUSCULOSKELETAL - ADULT  Goal: Maintain or return mobility to safest level of function  Description: INTERVENTIONS:  - Assess patient's ability to carry out ADLs; assess patient's baseline for ADL function and identify physical deficits which impact ability to perform ADLs (bathing, care of mouth/teeth, toileting, grooming, dressing, etc.)  - Assess/evaluate cause of self-care deficits   - Assess range of motion  - Assess patient's mobility  - Assess patient's need for assistive devices and provide as appropriate  - Encourage maximum independence but intervene and supervise when necessary  - Involve family in performance of ADLs  - Assess for home care needs following discharge   - Consider OT consult to assist with ADL evaluation and planning for discharge  - Provide patient education as appropriate  Outcome: Progressing

## 2024-02-13 NOTE — ASSESSMENT & PLAN NOTE
Lab Results   Component Value Date    HGBA1C 5.5 11/27/2023       Recent Labs     02/13/24  1102 02/13/24  1104 02/13/24  1108 02/13/24  1110   POCGLU 244* 480* 125 145*         Blood Sugar Average: Last 72 hrs:  (P) 168.5  Oral hypoglycemic medications remain on hold  Target blood sugar for the hospital is 140-180  Continue Accu-Cheks before meals and at bedtime with sliding scale insulin coverage  Diabetic neuropathy-continue gabapentin

## 2024-02-14 LAB
GLUCOSE SERPL-MCNC: 157 MG/DL (ref 65–140)
GLUCOSE SERPL-MCNC: 178 MG/DL (ref 65–140)
GLUCOSE SERPL-MCNC: 195 MG/DL (ref 65–140)
GLUCOSE SERPL-MCNC: 260 MG/DL (ref 65–140)

## 2024-02-14 PROCEDURE — 82948 REAGENT STRIP/BLOOD GLUCOSE: CPT

## 2024-02-14 PROCEDURE — 99232 SBSQ HOSP IP/OBS MODERATE 35: CPT

## 2024-02-14 RX ADMIN — CALCIUM 1 TABLET: 500 TABLET ORAL at 21:04

## 2024-02-14 RX ADMIN — QUETIAPINE 200 MG: 400 TABLET, FILM COATED, EXTENDED RELEASE ORAL at 08:31

## 2024-02-14 RX ADMIN — ACETAMINOPHEN 650 MG: 325 TABLET ORAL at 09:42

## 2024-02-14 RX ADMIN — INSULIN LISPRO 1 UNITS: 100 INJECTION, SOLUTION INTRAVENOUS; SUBCUTANEOUS at 21:10

## 2024-02-14 RX ADMIN — QUETIAPINE 400 MG: 400 TABLET, FILM COATED, EXTENDED RELEASE ORAL at 21:03

## 2024-02-14 RX ADMIN — INSULIN LISPRO 1 UNITS: 100 INJECTION, SOLUTION INTRAVENOUS; SUBCUTANEOUS at 17:04

## 2024-02-14 RX ADMIN — PRAVASTATIN SODIUM 80 MG: 40 TABLET ORAL at 17:02

## 2024-02-14 RX ADMIN — INSULIN LISPRO 1 UNITS: 100 INJECTION, SOLUTION INTRAVENOUS; SUBCUTANEOUS at 07:45

## 2024-02-14 RX ADMIN — DOCUSATE SODIUM 100 MG: 100 CAPSULE, LIQUID FILLED ORAL at 17:02

## 2024-02-14 RX ADMIN — LISINOPRIL 10 MG: 10 TABLET ORAL at 08:20

## 2024-02-14 RX ADMIN — GABAPENTIN 300 MG: 300 CAPSULE ORAL at 21:04

## 2024-02-14 RX ADMIN — HEPARIN SODIUM 5000 UNITS: 5000 INJECTION INTRAVENOUS; SUBCUTANEOUS at 06:19

## 2024-02-14 RX ADMIN — DICLOFENAC SODIUM 2 G: 10 GEL TOPICAL at 17:05

## 2024-02-14 RX ADMIN — QUETIAPINE 200 MG: 400 TABLET, FILM COATED, EXTENDED RELEASE ORAL at 14:52

## 2024-02-14 RX ADMIN — DICLOFENAC SODIUM 2 G: 10 GEL TOPICAL at 21:08

## 2024-02-14 RX ADMIN — CALCIUM 1 TABLET: 500 TABLET ORAL at 17:02

## 2024-02-14 RX ADMIN — DICLOFENAC SODIUM 2 G: 10 GEL TOPICAL at 12:01

## 2024-02-14 RX ADMIN — CHOLECALCIFEROL TAB 25 MCG (1000 UNIT) 1000 UNITS: 25 TAB at 08:22

## 2024-02-14 RX ADMIN — DIVALPROEX SODIUM 750 MG: 250 TABLET, DELAYED RELEASE ORAL at 08:20

## 2024-02-14 RX ADMIN — INSULIN LISPRO 2 UNITS: 100 INJECTION, SOLUTION INTRAVENOUS; SUBCUTANEOUS at 12:00

## 2024-02-14 RX ADMIN — GABAPENTIN 300 MG: 300 CAPSULE ORAL at 17:02

## 2024-02-14 RX ADMIN — CALCIUM 1 TABLET: 500 TABLET ORAL at 08:21

## 2024-02-14 RX ADMIN — DIVALPROEX SODIUM 1000 MG: 500 TABLET, DELAYED RELEASE ORAL at 21:03

## 2024-02-14 RX ADMIN — ACETAMINOPHEN 650 MG: 325 TABLET ORAL at 18:55

## 2024-02-14 RX ADMIN — TEMAZEPAM 15 MG: 7.5 CAPSULE ORAL at 22:26

## 2024-02-14 RX ADMIN — HEPARIN SODIUM 5000 UNITS: 5000 INJECTION INTRAVENOUS; SUBCUTANEOUS at 21:04

## 2024-02-14 RX ADMIN — LACOSAMIDE 150 MG: 150 TABLET ORAL at 08:21

## 2024-02-14 RX ADMIN — DICLOFENAC SODIUM 2 G: 10 GEL TOPICAL at 08:27

## 2024-02-14 RX ADMIN — LEVETIRACETAM 1500 MG: 500 TABLET, FILM COATED ORAL at 21:03

## 2024-02-14 RX ADMIN — LORATADINE 10 MG: 10 TABLET ORAL at 08:20

## 2024-02-14 RX ADMIN — HYDROXYZINE HYDROCHLORIDE 25 MG: 50 TABLET, FILM COATED ORAL at 14:52

## 2024-02-14 RX ADMIN — LEVETIRACETAM 1500 MG: 500 TABLET, FILM COATED ORAL at 08:20

## 2024-02-14 RX ADMIN — GABAPENTIN 300 MG: 300 CAPSULE ORAL at 08:21

## 2024-02-14 RX ADMIN — LACOSAMIDE 200 MG: 150 TABLET ORAL at 21:03

## 2024-02-14 RX ADMIN — ZONISAMIDE 300 MG: 100 CAPSULE ORAL at 21:03

## 2024-02-14 RX ADMIN — DOCUSATE SODIUM 100 MG: 100 CAPSULE, LIQUID FILLED ORAL at 08:21

## 2024-02-14 RX ADMIN — HYDROXYZINE HYDROCHLORIDE 25 MG: 50 TABLET, FILM COATED ORAL at 08:20

## 2024-02-14 RX ADMIN — B-COMPLEX W/ C & FOLIC ACID TAB 1 TABLET: TAB at 08:21

## 2024-02-14 RX ADMIN — BENZTROPINE MESYLATE 0.5 MG: 0.5 TABLET ORAL at 08:21

## 2024-02-14 RX ADMIN — BENZTROPINE MESYLATE 0.5 MG: 0.5 TABLET ORAL at 17:02

## 2024-02-14 NOTE — ASSESSMENT & PLAN NOTE
Status post a mechanical fall prior to arrival   CT right lower extremity imaging confirmed a comminuted and nondisplaced fractures of the right greater trochanter with no evidence of femoral neck fracture.   Orthopedic surgery consultation appreciated- No indication at this time for an acute phase inpatient surgical intervention.   Weightbearing as tolerated  Continue current pain medication management regimen  PT/OT recommending postacute rehab  Status post a psychiatric evaluation-the patient has been cleared from a psych standpoint for rehab at a facility  Case management is working on placement  Medically stable, will discharge when arrangements finalized by case management

## 2024-02-14 NOTE — CASE MANAGEMENT
Case Management Discharge Planning Note    Patient name Jairon Oconnell  Location /-01 MRN [id number]  : 1965 Date 2024       Current Admission Date: 2024  Current Admission Diagnosis:Nondisplaced fracture of greater trochanter of right femur, initial encounter for closed fracture ([facility])   Patient Active Problem List    Diagnosis Date Noted    Nondisplaced fracture of greater trochanter of right femur, initial encounter for closed fracture ([facility]) 2024    Closed nondisplaced fracture of proximal phalanx of left index finger with routine healing, subsequent encounter 10/30/2023    Pressure ulcer of sacral region, stage 3 ([facility]) 2023    Ambulatory dysfunction 2022    Social problem 2021    Hypomagnesemia 2021    Thrombocytopathia ([facility]) 2020    Hyponatremia 2019    Metabolic encephalopathy 2019    Seborrheic dermatitis of scalp 2019    Nearsightedness 2019    Behavior concern in adult 2019    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    Vitamin B12 deficiency 2017    Hyperlipidemia 2016    Vitamin D deficiency 2016    Type 2 diabetes mellitus with diabetic neuropathy, without long-term current use of insulin ([facility]) 06/15/2016    Acquired hypothyroidism 06/15/2016    Cataract 2013    Cerebral palsy ([facility]) 2013    Generalized convulsive epilepsy ([facility]) 2013    Essential hypertension 2013    Osteoporosis 2013    Scoliosis 2013      LOS (days): 10  Geometric Mean LOS (GMLOS) (days): 2.8  Days to GMLOS:-7.4     OBJECTIVE:  Risk of Unplanned Readmission Score: 16.95         Current admission status: Inpatient   Preferred Pharmacy:   [facility] [place] - [address]  [address] [address]  Phone: [phone] Fax: [phone]    Primary Care Provider: HUNTER Brown    Primary Insurance: MEDICARE  Secondary Insurance:  [place] [employer]    DISCHARGE DETAILS:    Discharge planning discussed with:: janeth & Kemi at the bedside  Freedom of Choice: Yes  Comments - Freedom of Choice: recommendation is for rehab- referrals were sent- option process started- 1 of the 3 needed letters have been received- list was reviewed wiht sister - she would like [facility] contacted family/caregiver?: Yes             Contacts  Patient Contacts: Kemi (sister)  Jaylyn (care giver)  Relationship to Patient:: Family  Contact Method: In Person  Reason/Outcome: Discharge Planning    Requested Home Health Care         Is the patient interested in HHC at discharge?: No    DME Referral Provided  Referral made for DME?: No    Other Referral/Resources/Interventions Provided:  Interventions: Short Term Rehab  Referral Comments: pt was accepted by [facility]- 1 letter has been received - 2 letters needed before pt can be d/c - pt needs to be off a 1:1 for 24- 48hrs    Would you like to participate in our [facility] service program?  : No - Declined    Treatment Team Recommendation: Short Term Rehab ([facility] need determination letters- bls)                                         Family notified:: sister at the bedside

## 2024-02-14 NOTE — TELEPHONE ENCOUNTER
Please arrange for a peer to peer. He is on it for epilepsy, which by their letter should get it approved.

## 2024-02-14 NOTE — TELEPHONE ENCOUNTER
Call placed to [facility]. [phone] regarding   PA Reference # [id number].     Spoke to appeals department. Additional information submitted. Faxed clinicals - [phone]. 7 day turn around time.

## 2024-02-14 NOTE — PROGRESS NOTES
[facility]  Progress Note  Name: Jairon Oconnell I  MRN: [id number]  Unit/Bed#: -01 I Date of Admission: 2/4/2024   Date of Service: 2/14/2024 I Hospital Day: 10    Assessment/Plan   * Nondisplaced fracture of greater trochanter of right femur, initial encounter for closed fracture (HCC)  Assessment & Plan  Status post a mechanical fall prior to arrival   CT right lower extremity imaging confirmed a comminuted and nondisplaced fractures of the right greater trochanter with no evidence of femoral neck fracture.   Orthopedic surgery consultation appreciated- No indication at this time for an acute phase inpatient surgical intervention.   Weightbearing as tolerated  Continue current pain medication management regimen  PT/OT recommending postacute rehab  Status post a psychiatric evaluation-the patient has been cleared from a psych standpoint for rehab at a facility  Case management is working on placement  Medically stable, will discharge when arrangements finalized by case management    Generalized convulsive epilepsy (HCC)  Assessment & Plan  Currently controlled  Continue Depakote 750 mg in the morning and at 1000 mg at bedtime, Vimpat 150 mg in the morning and 200 mg at bedtime, Keppra 750 mg BID, and zonisamide 300 mg at bedtime      Cerebral palsy ([facility])  Assessment & Plan  He lives in a group home and has caregiver  Continue supportive care    Behavior concern in adult  Assessment & Plan  With impulsive behavior at times  Continue Seroquel 200 mg at 8 AM and 2 PM and 400 mg at bedtime  Continue hydroxyzine 25 mg twice daily  Continue benztropine 0.5 mg twice daily  Supportive care  Placed on continual observation 2/12- will continue for now    Type 2 diabetes mellitus with diabetic neuropathy, without long-term current use of insulin ([facility])  Assessment & Plan  Lab Results   Component Value Date    HGBA1C 5.5 11/27/2023       Recent Labs     02/13/24  1558 02/13/24  2044 02/14/24  0631  02/14/24  1055   POCGLU 147* 149* 178* 260*         Blood Sugar Average: Last 72 hrs:  (P) [id number]  Oral hypoglycemic medications hold while inpatient resume on discharge  Target blood sugar for the hospital is 140-180  Continue SSI  CHO diet  Hypoglycemia protocol  BMP a.m.  Diabetic neuropathy-continue gabapentin    Hyperlipidemia  Assessment & Plan  Continue pravastatin    Acquired hypothyroidism  Assessment & Plan  Continue prehospital levothyroxine    Essential hypertension  Assessment & Plan  Blood pressure controlled on current regimen  Continue lisinopril  Monitor BP per protocol               VTE Pharmacologic Prophylaxis: VTE Score: 6 High Risk (Score >/= 5) - Pharmacological DVT Prophylaxis Ordered: heparin. Sequential Compression Devices Ordered.    Mobility:   Basic Mobility Inpatient Raw Score: 13  -HLM Goal: 4: Move to chair/commode  JH-HLM Achieved: 3: Sit at edge of bed  HLM Goal NOT achieved. Continue with multidisciplinary rounding and encourage appropriate mobility to improve upon HLM goals.    Patient Centered Rounds: I performed bedside rounds with nursing staff today.   Discussions with Specialists or Other Care Team Provider: Nursing, PT/OT, case management    Education and Discussions with Family / Patient: Attempted to update  (sister) via phone. Left voicemail.     Total Time Spent on Date of Encounter in care of patient: 37 mins. This time was spent on one or more of the following: performing physical exam; counseling and coordination of care; obtaining or reviewing history; documenting in the medical record; reviewing/ordering tests, medications or procedures; communicating with other healthcare professionals and discussing with patient's family/caregivers.    Current Length of Stay: 10 day(s)  Current Patient Status: Inpatient   Certification Statement: The patient will continue to require additional inpatient hospital stay due to coordination of care.   Requires rehab placement on discharge.  Discharge Plan:  Patient is medically stable for discharge.  Rehab referrals have been made by case management.  Discharge when arrangements finalized by case management.    Code Status: Level 1 - Full Code    Subjective:   Patient pleasant denies pain or discomfort when asked.    Objective:     Vitals:   Temp (24hrs), Av.4 °F (36.3 °C), Min:96.7 °F (35.9 °C), Max:98 °F (36.7 °C)    Temp:  [96.7 °F (35.9 °C)-98 °F (36.7 °C)] 96.7 °F (35.9 °C)  HR:  [72-87] 72  Resp:  [18] 18  BP: (116-147)/(75-87) 116/76  SpO2:  [95 %-97 %] 97 %  Body mass index is 21.14 kg/m².     Input and Output Summary (last 24 hours):     Intake/Output Summary (Last 24 hours) at 2024 1138  Last data filed at 2024 0940  Gross per 24 hour   Intake 480 ml   Output 1850 ml   Net -1370 ml       Physical Exam:   Physical Exam  Vitals and nursing note reviewed.   Constitutional:       General: He is not in acute distress.     Appearance: He is well-developed.   HENT:      Head: Normocephalic and atraumatic.   Eyes:      Conjunctiva/sclera: Conjunctivae normal.   Cardiovascular:      Rate and Rhythm: Normal rate and regular rhythm.      Pulses: Normal pulses.      Heart sounds: Normal heart sounds. No murmur heard.  Pulmonary:      Effort: Pulmonary effort is normal. No respiratory distress.      Breath sounds: Normal breath sounds. No wheezing or rales.   Abdominal:      General: Bowel sounds are normal. There is no distension.      Palpations: Abdomen is soft.      Tenderness: There is no abdominal tenderness. There is no guarding.   Musculoskeletal:         General: No swelling. Normal range of motion.      Cervical back: Neck supple.   Skin:     General: Skin is warm and dry.      Capillary Refill: Capillary refill takes less than 2 seconds.   Neurological:      Mental Status: He is alert. Mental status is at baseline.      Comments: Gait not assessed at this time   Psychiatric:         Mood and  Affect: Mood normal.          Additional Data:     Labs:  Results from last 7 days   Lab Units 02/13/24  0606 02/10/24  0614   WBC Thousand/uL 8.95 7.27   HEMOGLOBIN g/dL 12.6 11.6*   HEMATOCRIT % 37.1 34.8*   PLATELETS Thousands/uL 178 147*   NEUTROS PCT %  --  32*   LYMPHS PCT %  --  47*   MONOS PCT %  --  14*   EOS PCT %  --  5     Results from last 7 days   Lab Units 02/13/24  0606   SODIUM mmol/L 140   POTASSIUM mmol/L 4.3   CHLORIDE mmol/L 104   CO2 mmol/L 29   BUN mg/dL 24   CREATININE mg/dL 0.69   ANION GAP mmol/L 7   CALCIUM mg/dL 9.4   GLUCOSE RANDOM mg/dL 121         Results from last 7 days   Lab Units 02/14/24  1055 02/14/24  0631 02/13/24  2044 02/13/24  1558 02/13/24  1110 02/13/24  1108 02/13/24  1104 02/13/24  1102 02/13/24  1100 02/13/24  0656 02/12/24  2116 02/12/24  1601   POC GLUCOSE mg/dl 260* 178* 149* 147* 145* 125 480* 244* 340* 108 142* 140               Lines/Drains:  Invasive Devices       Peripheral Intravenous Line  Duration             Peripheral IV 02/13/24 Dorsal (posterior);Right Hand 1 day                          Imaging: Reviewed radiology reports from this admission including: xray(s)    Recent Cultures (last 7 days):         Last 24 Hours Medication List:   Current Facility-Administered Medications   Medication Dose Route Frequency Provider Last Rate    acetaminophen  650 mg Oral Q6H PRN Ashley Good, NATHENNP      benztropine  0.5 mg Oral BID Ashley Good, NATHENNP      calcium carbonate  1 tablet Oral TID Ashley Good, CRNP      cholecalciferol  1,000 Units Oral Daily Ashley Good, CRNP      vitamin B-12  1,000 mcg Oral Every Other Day Ashley Good, NATHENNP      Diclofenac Sodium  2 g Topical 4x Daily Jose Angel Carpenter PA-C      divalproex sodium  1,000 mg Oral HS Ashley Good, CRNP      divalproex sodium  750 mg Oral Daily Ashley Good, CRNP      docusate sodium  100 mg Oral BID HUNTER Main      gabapentin  300 mg Oral TID HUNTER Main       heparin (porcine)  5,000 Units Subcutaneous Q8H RONALD Ashley TUCKER Florentino, CRNP      hydrOXYzine HCL  25 mg Oral BID Ashley M Florentino, CRNP      insulin lispro  1-5 Units Subcutaneous TID AC Ashley M Florentino, CRNP      insulin lispro  1-5 Units Subcutaneous HS Ashley M Florentino, CRNP      lacosamide  150 mg Oral Daily Ashley M Florentino, CRNP      lacosamide  200 mg Oral HS Ashley M Florentino, CRNP      levETIRAcetam  1,500 mg Oral Q12H RONALD Ashley TUCKER Florentino, CRNP      levothyroxine  150 mcg Oral Early Morning Ashleyrin Laureanoust, CRNP      lisinopril  10 mg Oral Daily Ashley M Florentino, CRNP      loratadine  10 mg Oral Daily Ashley M Florentino, CRNP      multivitamin stress formula  1 tablet Oral Daily Ashleyrin Laureanoust, CRNP      pravastatin  80 mg Oral Daily With Dinner Ashley M Florentino, CRNP      QUEtiapine  200 mg Oral BID Ashley CAITLIN LaureanoFlorentino, CRNP      QUEtiapine  400 mg Oral HS Ashley CAITLIN Florentino, CRNP      temazepam  15 mg Oral HS PRN Ashley TUCKER Florentino, CRNP      zonisamide  300 mg Oral HS [facility], CR[facility]          Today, Patient Was Seen By: HUNTER Babcock    **Please Note: This note may have been constructed using a voice recognition system.**

## 2024-02-14 NOTE — PLAN OF CARE
Problem: Potential for Falls  Goal: Patient will remain free of falls  Description: INTERVENTIONS:  - Educate patient/family on patient safety including physical limitations  - Instruct patient to call for assistance with activity   - Consult OT/PT to assist with strengthening/mobility   - Keep Call bell within reach  - Keep bed low and locked with side rails adjusted as appropriate  - Keep care items and personal belongings within reach  - Initiate and maintain comfort rounds  - Make Fall Risk Sign visible to staff  - Offer Toileting every 2 Hours, in advance of need  - Initiate/Maintain alarms  - Obtain necessary fall risk management equipment:   - Apply yellow socks and bracelet for high fall risk patients  - Consider moving patient to room near nurses station  Outcome: Progressing     Problem: Prexisting or High Potential for Compromised Skin Integrity  Goal: Skin integrity is maintained or improved  Description: INTERVENTIONS:  - Identify patients at risk for skin breakdown  - Assess and monitor skin integrity  - Assess and monitor nutrition and hydration status  - Monitor labs   - Assess for incontinence   - Turn and reposition patient  - Assist with mobility/ambulation  - Relieve pressure over bony prominences  - Avoid friction and shearing  - Provide appropriate hygiene as needed including keeping skin clean and dry  - Evaluate need for skin moisturizer/barrier cream  - Collaborate with interdisciplinary team   - Patient/family teaching  - Consider wound care consult   Outcome: Progressing     Problem: PAIN - ADULT  Goal: Verbalizes/displays adequate comfort level or baseline comfort level  Description: Interventions:  - Encourage patient to monitor pain and request assistance  - Assess pain using appropriate pain scale  - Administer analgesics based on type and severity of pain and evaluate response  - Implement non-pharmacological measures as appropriate and evaluate response  - Consider cultural and  social influences on pain and pain management  - Notify physician/advanced practitioner if interventions unsuccessful or patient reports new pain  Outcome: Progressing     Problem: INFECTION - ADULT  Goal: Absence or prevention of progression during hospitalization  Description: INTERVENTIONS:  - Assess and monitor for signs and symptoms of infection  - Monitor lab/diagnostic results  - Monitor all insertion sites, i.e. indwelling lines, tubes, and drains  - Monitor endotracheal if appropriate and nasal secretions for changes in amount and color  - [place] appropriate cooling/warming therapies per order  - Administer medications as ordered  - Instruct and encourage patient and family to use good hand hygiene technique  - Identify and instruct in appropriate isolation precautions for identified infection/condition  Outcome: Progressing     Problem: SAFETY ADULT  Goal: Maintain or return to baseline ADL function  Description: INTERVENTIONS:  -  Assess patient's ability to carry out ADLs; assess patient's baseline for ADL function and identify physical deficits which impact ability to perform ADLs (bathing, care of mouth/teeth, toileting, grooming, dressing, etc.)  - Assess/evaluate cause of self-care deficits   - Assess range of motion  - Assess patient's mobility; develop plan if impaired  - Assess patient's need for assistive devices and provide as appropriate  - Encourage maximum independence but intervene and supervise when necessary  - Involve family in performance of ADLs  - Assess for home care needs following discharge   - Consider OT consult to assist with ADL evaluation and planning for discharge  - Provide patient education as appropriate  Outcome: Progressing  Goal: Maintains/Returns to pre admission functional level  Description: INTERVENTIONS:  - Perform AM-PAC 6 Click Basic Mobility/ Daily Activity assessment daily.  - Set and communicate daily mobility goal to care team and patient/family/caregiver.    - Collaborate with rehabilitation services on mobility goals if consulted  - Reposition patient every 2 hours.  - Out of bed for toileting/meals  - Record patient progress and toleration of activity level   Outcome: Progressing     Problem: DISCHARGE PLANNING  Goal: Discharge to home or other facility with appropriate resources  Description: INTERVENTIONS:  - Identify barriers to discharge w/patient and caregiver  - Arrange for needed discharge resources and transportation as appropriate  - Identify discharge learning needs (meds, wound care, etc.)  - Refer to Case Management Department for coordinating discharge planning if the patient needs post-hospital services based on physician/advanced practitioner order or complex needs related to functional status, cognitive ability, or social support system  Outcome: Progressing     Problem: Knowledge Deficit  Goal: Patient/family/caregiver demonstrates understanding of disease process, treatment plan, medications, and discharge instructions  Description: Complete learning assessment and assess knowledge base.  Interventions:  - Provide teaching at level of understanding  - Provide teaching via preferred learning methods  Outcome: Progressing     Problem: METABOLIC, FLUID AND ELECTROLYTES - ADULT  Goal: Glucose maintained within target range  Description: INTERVENTIONS:  - Monitor Blood Glucose as ordered  - Assess for signs and symptoms of hyperglycemia and hypoglycemia  - Administer ordered medications to maintain glucose within target range  - Assess nutritional intake and initiate nutrition service referral as needed  Outcome: Progressing     Problem: SKIN/TISSUE INTEGRITY - ADULT  Goal: Skin Integrity remains intact(Skin Breakdown Prevention)  Description: Assess:  -Perform Nolan assessment every shift  -Clean and moisturize skin every shift  -Inspect skin when repositioning, toileting, and assisting with ADLS  -Assess under medical devices such as jacy every  shift  -Assess extremities for adequate circulation and sensation     Bed Management:  -Have minimal linens on bed & keep smooth, unwrinkled  -Change linens as needed when moist or perspiring  -Avoid sitting or lying in one position for more than 2 hours while in bed  -Keep HOB at 20 degrees     Toileting:  -Offer bedside commode  -Assess for incontinence every 2 hours  -Use incontinent care products after each incontinent episode such as hydraguard    Activity:  -Mobilize patient 3 times a day  -Encourage activity and walks on unit  -Encourage or provide ROM exercises   -Turn and reposition patient every 2 Hours  -Use appropriate equipment to lift or move patient in bed  -Instruct/ Assist with weight shifting every 30 min when out of bed in chair  -Consider limitation of chair time 2 hour intervals    Skin Care:  -Avoid use of baby powder, tape, friction and shearing, hot water or constrictive clothing  -Relieve pressure over bony prominences using hydraguard  -Do not massage red bony areas    Next Steps:  -Teach patient strategies to minimize risks such as frequent repositioning   -Consider consults to  interdisciplinary teams such as wound care  Outcome: Progressing  Goal: Pressure injury heals and does not worsen  Description: Interventions:  - Implement low air loss mattress or specialty surface (Criteria met)  - Apply silicone foam dressing  - Instruct/assist with weight shifting every 30 min minutes when in chair   - Limit chair time to 2 hour intervals  - Use special pressure reducing interventions such as cushion when in chair   - Apply fecal or urinary incontinence containment device   - Perform passive or active ROM every shift  - Turn and reposition patient & offload bony prominences every 2 hours   - Utilize friction reducing device or surface for transfers   - Consider consults to  interdisciplinary teams such as wound care  - Use incontinent care products after each incontinent episode such as  hydraguard  - Consider nutrition services referral as needed  Outcome: Progressing     Problem: MUSCULOSKELETAL - ADULT  Goal: Maintain or return mobility to safest level of function  Description: INTERVENTIONS:  - Assess patient's ability to carry out ADLs; assess patient's baseline for ADL function and identify physical deficits which impact ability to perform ADLs (bathing, care of mouth/teeth, toileting, grooming, dressing, etc.)  - Assess/evaluate cause of self-care deficits   - Assess range of motion  - Assess patient's mobility  - Assess patient's need for assistive devices and provide as appropriate  - Encourage maximum independence but intervene and supervise when necessary  - Involve family in performance of ADLs  - Assess for home care needs following discharge   - Consider OT consult to assist with ADL evaluation and planning for discharge  - Provide patient education as appropriate  Outcome: Progressing

## 2024-02-14 NOTE — ASSESSMENT & PLAN NOTE
Lab Results   Component Value Date    HGBA1C 5.5 11/27/2023       Recent Labs     02/13/24  1558 02/13/24  2044 02/14/24  0631 02/14/24  1055   POCGLU 147* 149* 178* 260*         Blood Sugar Average: Last 72 hrs:  (P) [id number]  Oral hypoglycemic medications hold while inpatient resume on discharge  Target blood sugar for the hospital is 140-180  Continue SSI  CHO diet  Hypoglycemia protocol  BMP a.m.  Diabetic neuropathy-continue gabapentin

## 2024-02-15 LAB
ANION GAP SERPL CALCULATED.3IONS-SCNC: 8 MMOL/L
BASOPHILS # BLD AUTO: 0.05 THOUSANDS/ÂΜL (ref 0–0.1)
BASOPHILS NFR BLD AUTO: 1 % (ref 0–1)
BUN SERPL-MCNC: 27 MG/DL (ref 5–25)
CALCIUM SERPL-MCNC: 9.2 MG/DL (ref 8.4–10.2)
CHLORIDE SERPL-SCNC: 106 MMOL/L (ref 96–108)
CO2 SERPL-SCNC: 29 MMOL/L (ref 21–32)
CREAT SERPL-MCNC: 0.78 MG/DL (ref 0.6–1.3)
EOSINOPHIL # BLD AUTO: 0.34 THOUSAND/ÂΜL (ref 0–0.61)
EOSINOPHIL NFR BLD AUTO: 5 % (ref 0–6)
ERYTHROCYTE [DISTWIDTH] IN BLOOD BY AUTOMATED COUNT: 13.6 % (ref 11.6–15.1)
GFR SERPL CREATININE-BSD FRML MDRD: 100 ML/MIN/1.73SQ M
GLUCOSE SERPL-MCNC: 116 MG/DL (ref 65–140)
GLUCOSE SERPL-MCNC: 117 MG/DL (ref 65–140)
GLUCOSE SERPL-MCNC: 121 MG/DL (ref 65–140)
GLUCOSE SERPL-MCNC: 184 MG/DL (ref 65–140)
GLUCOSE SERPL-MCNC: 482 MG/DL (ref 65–140)
HCT VFR BLD AUTO: 35.8 % (ref 36.5–49.3)
HGB BLD-MCNC: 11.6 G/DL (ref 12–17)
IMM GRANULOCYTES # BLD AUTO: 0.04 THOUSAND/UL (ref 0–0.2)
IMM GRANULOCYTES NFR BLD AUTO: 1 % (ref 0–2)
LYMPHOCYTES # BLD AUTO: 4.24 THOUSANDS/ÂΜL (ref 0.6–4.47)
LYMPHOCYTES NFR BLD AUTO: 55 % (ref 14–44)
MCH RBC QN AUTO: 32.3 PG (ref 26.8–34.3)
MCHC RBC AUTO-ENTMCNC: 32.4 G/DL (ref 31.4–37.4)
MCV RBC AUTO: 100 FL (ref 82–98)
MONOCYTES # BLD AUTO: 0.82 THOUSAND/ÂΜL (ref 0.17–1.22)
MONOCYTES NFR BLD AUTO: 11 % (ref 4–12)
NEUTROPHILS # BLD AUTO: 2.01 THOUSANDS/ÂΜL (ref 1.85–7.62)
NEUTS SEG NFR BLD AUTO: 27 % (ref 43–75)
NRBC BLD AUTO-RTO: 0 /100 WBCS
PLATELET # BLD AUTO: 176 THOUSANDS/UL (ref 149–390)
PMV BLD AUTO: 11.1 FL (ref 8.9–12.7)
POTASSIUM SERPL-SCNC: 4.4 MMOL/L (ref 3.5–5.3)
RBC # BLD AUTO: 3.59 MILLION/UL (ref 3.88–5.62)
SODIUM SERPL-SCNC: 143 MMOL/L (ref 135–147)
WBC # BLD AUTO: 7.5 THOUSAND/UL (ref 4.31–10.16)

## 2024-02-15 PROCEDURE — 99232 SBSQ HOSP IP/OBS MODERATE 35: CPT

## 2024-02-15 PROCEDURE — 80048 BASIC METABOLIC PNL TOTAL CA: CPT

## 2024-02-15 PROCEDURE — 85025 COMPLETE CBC W/AUTO DIFF WBC: CPT

## 2024-02-15 PROCEDURE — 82948 REAGENT STRIP/BLOOD GLUCOSE: CPT

## 2024-02-15 RX ADMIN — CALCIUM 1 TABLET: 500 TABLET ORAL at 16:58

## 2024-02-15 RX ADMIN — INSULIN LISPRO 5 UNITS: 100 INJECTION, SOLUTION INTRAVENOUS; SUBCUTANEOUS at 16:25

## 2024-02-15 RX ADMIN — DICLOFENAC SODIUM 2 G: 10 GEL TOPICAL at 21:52

## 2024-02-15 RX ADMIN — PRAVASTATIN SODIUM 80 MG: 40 TABLET ORAL at 16:58

## 2024-02-15 RX ADMIN — LEVOTHYROXINE SODIUM 150 MCG: 75 TABLET ORAL at 06:05

## 2024-02-15 RX ADMIN — BENZTROPINE MESYLATE 0.5 MG: 0.5 TABLET ORAL at 08:14

## 2024-02-15 RX ADMIN — QUETIAPINE 400 MG: 400 TABLET, FILM COATED, EXTENDED RELEASE ORAL at 21:49

## 2024-02-15 RX ADMIN — B-COMPLEX W/ C & FOLIC ACID TAB 1 TABLET: TAB at 08:14

## 2024-02-15 RX ADMIN — LISINOPRIL 10 MG: 10 TABLET ORAL at 08:12

## 2024-02-15 RX ADMIN — LEVETIRACETAM 1500 MG: 500 TABLET, FILM COATED ORAL at 08:12

## 2024-02-15 RX ADMIN — CALCIUM 1 TABLET: 500 TABLET ORAL at 08:14

## 2024-02-15 RX ADMIN — HYDROXYZINE HYDROCHLORIDE 25 MG: 50 TABLET, FILM COATED ORAL at 13:46

## 2024-02-15 RX ADMIN — INSULIN LISPRO 1 UNITS: 100 INJECTION, SOLUTION INTRAVENOUS; SUBCUTANEOUS at 21:52

## 2024-02-15 RX ADMIN — HEPARIN SODIUM 5000 UNITS: 5000 INJECTION INTRAVENOUS; SUBCUTANEOUS at 06:05

## 2024-02-15 RX ADMIN — DICLOFENAC SODIUM 2 G: 10 GEL TOPICAL at 08:21

## 2024-02-15 RX ADMIN — GABAPENTIN 300 MG: 300 CAPSULE ORAL at 21:48

## 2024-02-15 RX ADMIN — LACOSAMIDE 150 MG: 150 TABLET ORAL at 08:12

## 2024-02-15 RX ADMIN — ZONISAMIDE 300 MG: 100 CAPSULE ORAL at 21:47

## 2024-02-15 RX ADMIN — GABAPENTIN 300 MG: 300 CAPSULE ORAL at 16:58

## 2024-02-15 RX ADMIN — CHOLECALCIFEROL TAB 25 MCG (1000 UNIT) 1000 UNITS: 25 TAB at 08:13

## 2024-02-15 RX ADMIN — LEVETIRACETAM 1500 MG: 500 TABLET, FILM COATED ORAL at 21:48

## 2024-02-15 RX ADMIN — DIVALPROEX SODIUM 1000 MG: 500 TABLET, DELAYED RELEASE ORAL at 21:49

## 2024-02-15 RX ADMIN — CYANOCOBALAMIN TAB 500 MCG 1000 MCG: 500 TAB at 08:12

## 2024-02-15 RX ADMIN — DOCUSATE SODIUM 100 MG: 100 CAPSULE, LIQUID FILLED ORAL at 17:00

## 2024-02-15 RX ADMIN — QUETIAPINE 200 MG: 400 TABLET, FILM COATED, EXTENDED RELEASE ORAL at 08:12

## 2024-02-15 RX ADMIN — GABAPENTIN 300 MG: 300 CAPSULE ORAL at 08:12

## 2024-02-15 RX ADMIN — QUETIAPINE 200 MG: 400 TABLET, FILM COATED, EXTENDED RELEASE ORAL at 13:45

## 2024-02-15 RX ADMIN — ACETAMINOPHEN 650 MG: 325 TABLET ORAL at 13:45

## 2024-02-15 RX ADMIN — CALCIUM 1 TABLET: 500 TABLET ORAL at 21:50

## 2024-02-15 RX ADMIN — LORATADINE 10 MG: 10 TABLET ORAL at 08:12

## 2024-02-15 RX ADMIN — DICLOFENAC SODIUM 2 G: 10 GEL TOPICAL at 17:00

## 2024-02-15 RX ADMIN — BENZTROPINE MESYLATE 0.5 MG: 0.5 TABLET ORAL at 17:00

## 2024-02-15 RX ADMIN — HYDROXYZINE HYDROCHLORIDE 25 MG: 50 TABLET, FILM COATED ORAL at 08:13

## 2024-02-15 RX ADMIN — HEPARIN SODIUM 5000 UNITS: 5000 INJECTION INTRAVENOUS; SUBCUTANEOUS at 21:52

## 2024-02-15 RX ADMIN — DIVALPROEX SODIUM 750 MG: 250 TABLET, DELAYED RELEASE ORAL at 08:12

## 2024-02-15 RX ADMIN — DOCUSATE SODIUM 100 MG: 100 CAPSULE, LIQUID FILLED ORAL at 08:15

## 2024-02-15 RX ADMIN — LACOSAMIDE 200 MG: 150 TABLET ORAL at 21:50

## 2024-02-15 NOTE — ASSESSMENT & PLAN NOTE
Lab Results   Component Value Date    HGBA1C 5.5 11/27/2023       Recent Labs     02/14/24  1603 02/14/24  2109 02/15/24  0712 02/15/24  1054   POCGLU 157* 195* 121 117         Blood Sugar Average: Last 72 hrs:  (P) [id number]  Oral hypoglycemic medications hold while inpatient resume on discharge  Target blood sugar for the hospital is 140-180  Sugars well-controlled  Continue SSI  CHO diet  Hypoglycemia protocol  BMP a.m.  Diabetic neuropathy-continue gabapentin

## 2024-02-15 NOTE — PROGRESS NOTES
[facility]  Progress Note  Name: Jairon Oconnell I  MRN: [id number]  Unit/Bed#: -01 I Date of Admission: 2/4/2024   Date of Service: 2/15/2024 I Hospital Day: 11    Assessment/Plan   * Nondisplaced fracture of greater trochanter of right femur, initial encounter for closed fracture (HCC)  Assessment & Plan  Status post a mechanical fall prior to arrival   CT right lower extremity imaging confirmed a comminuted and nondisplaced fractures of the right greater trochanter with no evidence of femoral neck fracture.   Orthopedic surgery consultation appreciated- No indication at this time for an acute phase inpatient surgical intervention.   Weightbearing as tolerated  Continue current pain medication management regimen  PT/OT recommending postacute rehab  Status post a psychiatric evaluation-the patient has been cleared from a psych standpoint for rehab at a facility  Case management is working on placement  Medically stable, will discharge when arrangements finalized by case management    Generalized convulsive epilepsy (HCC)  Assessment & Plan  Currently controlled  Continue Depakote 750 mg in the morning and at 1000 mg at bedtime, Vimpat 150 mg in the morning and 200 mg at bedtime, Keppra 750 mg BID, and zonisamide 300 mg at bedtime      Cerebral palsy ([facility])  Assessment & Plan  He lives in a group home and has caregiver  Continue supportive care    Behavior concern in adult  Assessment & Plan  With impulsive behavior at times  Continue Seroquel 200 mg at 8 AM and 2 PM and 400 mg at bedtime  Continue hydroxyzine 25 mg twice daily  Continue benztropine 0.5 mg twice daily  Supportive care  Nursing staff report patient has had no behavioral issues overnight will discontinue one-to-one virtual observer    Type 2 diabetes mellitus with diabetic neuropathy, without long-term current use of insulin ([facility])  Assessment & Plan  Lab Results   Component Value Date    HGBA1C 5.5 11/27/2023       Recent  Labs     02/14/24  1603 02/14/24  2109 02/15/24  0712 02/15/24  1054   POCGLU 157* 195* 121 117         Blood Sugar Average: Last 72 hrs:  (P) [id number]  Oral hypoglycemic medications hold while inpatient resume on discharge  Target blood sugar for the hospital is 140-180  Sugars well-controlled  Continue SSI  CHO diet  Hypoglycemia protocol  BMP a.m.  Diabetic neuropathy-continue gabapentin    Hyperlipidemia  Assessment & Plan  Continue pravastatin    Acquired hypothyroidism  Assessment & Plan  Continue prehospital levothyroxine    Essential hypertension  Assessment & Plan  Blood pressure controlled on current regimen  Continue lisinopril  Monitor BP per protocol               VTE Pharmacologic Prophylaxis: VTE Score: 6 High Risk (Score >/= 5) - Pharmacological DVT Prophylaxis Ordered: heparin. Sequential Compression Devices Ordered.    Mobility:   Basic Mobility Inpatient Raw Score: 13  JH-HLM Goal: 4: Move to chair/commode  JH-HLM Achieved: 2: Bed activities/Dependent transfer  HLM Goal NOT achieved. Continue with multidisciplinary rounding and encourage appropriate mobility to improve upon HLM goals.    Patient Centered Rounds: I performed bedside rounds with nursing staff today.   Discussions with Specialists or Other Care Team Provider: Nursing, PT/OT, case management    Education and Discussions with Family / Patient: Attempted to update  (sister) via phone. Left voicemail.     Total Time Spent on Date of Encounter in care of patient: 36 mins. This time was spent on one or more of the following: performing physical exam; counseling and coordination of care; obtaining or reviewing history; documenting in the medical record; reviewing/ordering tests, medications or procedures; communicating with other healthcare professionals and discussing with patient's family/caregivers.    Current Length of Stay: 11 day(s)  Current Patient Status: Inpatient   Certification Statement: The patient  will continue to require additional inpatient hospital stay due to coordination of care.  Requires rehab placement on discharge.  Discharge Plan:  Patient is medically stable for discharge.  Rehab referrals have been made by case management.  Discharge when arrangements finalized by case management.    Code Status: Level 1 - Full Code    Subjective:   Patient pleasant denies pain or discomfort when asked.  Pleasant and cooperative.    Objective:     Vitals:   Temp (24hrs), Av.9 °F (36.1 °C), Min:96.6 °F (35.9 °C), Max:97.2 °F (36.2 °C)    Temp:  [96.6 °F (35.9 °C)-97.2 °F (36.2 °C)] 96.6 °F (35.9 °C)  HR:  [72] 72  Resp:  [18] 18  BP: (135-141)/(75-83) 141/83  SpO2:  [96 %-97 %] 96 %  Body mass index is 21.14 kg/m².     Input and Output Summary (last 24 hours):     Intake/Output Summary (Last 24 hours) at 2/15/2024 1240  Last data filed at 2/15/2024 1231  Gross per 24 hour   Intake 600 ml   Output 1550 ml   Net -950 ml       Physical Exam:   Physical Exam  Vitals and nursing note reviewed.   Constitutional:       General: He is not in acute distress.     Appearance: He is well-developed.   HENT:      Head: Normocephalic and atraumatic.   Eyes:      Conjunctiva/sclera: Conjunctivae normal.   Cardiovascular:      Rate and Rhythm: Normal rate and regular rhythm.      Pulses: Normal pulses.      Heart sounds: Normal heart sounds. No murmur heard.  Pulmonary:      Effort: Pulmonary effort is normal. No respiratory distress.      Breath sounds: Normal breath sounds. No wheezing or rales.   Abdominal:      General: Bowel sounds are normal. There is no distension.      Palpations: Abdomen is soft.      Tenderness: There is no abdominal tenderness. There is no guarding.   Musculoskeletal:         General: No swelling. Normal range of motion.      Cervical back: Neck supple.   Skin:     General: Skin is warm and dry.      Capillary Refill: Capillary refill takes less than 2 seconds.   Neurological:      Mental Status: He  is alert. Mental status is at baseline.      Comments: Gait not assessed at this time   Psychiatric:         Mood and Affect: Mood normal.          Additional Data:     Labs:  Results from last 7 days   Lab Units 02/15/24  0602   WBC Thousand/uL 7.50   HEMOGLOBIN g/dL 11.6*   HEMATOCRIT % 35.8*   PLATELETS Thousands/uL 176   NEUTROS PCT % 27*   LYMPHS PCT % 55*   MONOS PCT % 11   EOS PCT % 5     Results from last 7 days   Lab Units 02/15/24  0602   SODIUM mmol/L 143   POTASSIUM mmol/L 4.4   CHLORIDE mmol/L 106   CO2 mmol/L 29   BUN mg/dL 27*   CREATININE mg/dL 0.78   ANION GAP mmol/L 8   CALCIUM mg/dL 9.2   GLUCOSE RANDOM mg/dL 116         Results from last 7 days   Lab Units 02/15/24  1054 02/15/24  0712 02/14/24  2109 02/14/24  1603 02/14/24  1055 02/14/24  0631 02/13/24  2044 02/13/24  1558 02/13/24  1110 02/13/24  1108 02/13/24  1104 02/13/24  1102   POC GLUCOSE mg/dl 117 121 195* 157* 260* 178* 149* 147* 145* 125 480* 244*               Lines/Drains:  Invasive Devices       Peripheral Intravenous Line  Duration             Peripheral IV 02/13/24 Dorsal (posterior);Right Hand 2 days                          Imaging: Reviewed radiology reports from this admission including: xray(s)    Recent Cultures (last 7 days):         Last 24 Hours Medication List:   Current Facility-Administered Medications   Medication Dose Route Frequency Provider Last Rate    acetaminophen  650 mg Oral Q6H PRN Ashley Good, HUNTER      benztropine  0.5 mg Oral BID Ashley Good, CRNP      calcium carbonate  1 tablet Oral TID Ashley Good, CRNP      cholecalciferol  1,000 Units Oral Daily Ashley Good, CRNP      vitamin B-12  1,000 mcg Oral Every Other Day Ashley Good, NATHENNP      Diclofenac Sodium  2 g Topical 4x Daily Jose Angel Carpenter PA-C      divalproex sodium  1,000 mg Oral HS Ashley Good, CRNP      divalproex sodium  750 mg Oral Daily Ashley Good, CRNP      docusate sodium  100 mg Oral BID Ashley Good,  CRNP      gabapentin  300 mg Oral TID Ashleyrin Laureanoust, CRNP      heparin (porcine)  5,000 Units Subcutaneous Q8H RONALD Ashley Laureanoust, CRNP      hydrOXYzine HCL  25 mg Oral BID Ashley M Florentino, CRNP      insulin lispro  1-5 Units Subcutaneous TID AC Ashley M Florentino, CRNP      insulin lispro  1-5 Units Subcutaneous HS Ashley M Florentino, CRNP      lacosamide  150 mg Oral Daily Ashleyrin Laureanoust, CRNP      lacosamide  200 mg Oral HS Ashleyrin Laureanoust, CRNP      levETIRAcetam  1,500 mg Oral Q12H RONALD Ashley Laureanoust, CRNP      levothyroxine  150 mcg Oral Early Morning Ashleyrin Laureanoust, CRNP      lisinopril  10 mg Oral Daily Ashley M Florentino, CRNP      loratadine  10 mg Oral Daily Ashley TUCKER Florentino, CRNP      multivitamin stress formula  1 tablet Oral Daily Ashleyrin Laureanoust, CRNP      pravastatin  80 mg Oral Daily With Dinner Ashley TUCKER Florentino, CRNP      QUEtiapine  200 mg Oral BID Ashley Laureanoust, CRNP      QUEtiapine  400 mg Oral HS Ashley Laureanoust, CRNP      temazepam  15 mg Oral HS PRN Ashley Laureanoust, CRNP      zonisamide  300 mg Oral HS Ashley TUCKER Florentino, CRNP          Today, Patient Was Seen By: HUNTER Babcock    **Please Note: This note may have been constructed using a voice recognition system.**

## 2024-02-15 NOTE — PLAN OF CARE
Problem: Potential for Falls  Goal: Patient will remain free of falls  Description: INTERVENTIONS:  - Educate patient/family on patient safety including physical limitations  - Instruct patient to call for assistance with activity   - Consult OT/PT to assist with strengthening/mobility   - Keep Call bell within reach  - Keep bed low and locked with side rails adjusted as appropriate  - Keep care items and personal belongings within reach  - Initiate and maintain comfort rounds  - Make Fall Risk Sign visible to staff  - Offer Toileting every 2 Hours, in advance of need  - Initiate/Maintain alarms  - Obtain necessary fall risk management equipment:   - Apply yellow socks and bracelet for high fall risk patients  - Consider moving patient to room near nurses station  Outcome: Progressing     Problem: Prexisting or High Potential for Compromised Skin Integrity  Goal: Skin integrity is maintained or improved  Description: INTERVENTIONS:  - Identify patients at risk for skin breakdown  - Assess and monitor skin integrity  - Assess and monitor nutrition and hydration status  - Monitor labs   - Assess for incontinence   - Turn and reposition patient  - Assist with mobility/ambulation  - Relieve pressure over bony prominences  - Avoid friction and shearing  - Provide appropriate hygiene as needed including keeping skin clean and dry  - Evaluate need for skin moisturizer/barrier cream  - Collaborate with interdisciplinary team   - Patient/family teaching  - Consider wound care consult   Outcome: Progressing     Problem: PAIN - ADULT  Goal: Verbalizes/displays adequate comfort level or baseline comfort level  Description: Interventions:  - Encourage patient to monitor pain and request assistance  - Assess pain using appropriate pain scale  - Administer analgesics based on type and severity of pain and evaluate response  - Implement non-pharmacological measures as appropriate and evaluate response  - Consider cultural and  social influences on pain and pain management  - Notify physician/advanced practitioner if interventions unsuccessful or patient reports new pain  Outcome: Progressing     Problem: INFECTION - ADULT  Goal: Absence or prevention of progression during hospitalization  Description: INTERVENTIONS:  - Assess and monitor for signs and symptoms of infection  - Monitor lab/diagnostic results  - Monitor all insertion sites, i.e. indwelling lines, tubes, and drains  - Monitor endotracheal if appropriate and nasal secretions for changes in amount and color  - [place] appropriate cooling/warming therapies per order  - Administer medications as ordered  - Instruct and encourage patient and family to use good hand hygiene technique  - Identify and instruct in appropriate isolation precautions for identified infection/condition  Outcome: Progressing     Problem: SAFETY ADULT  Goal: Maintain or return to baseline ADL function  Description: INTERVENTIONS:  -  Assess patient's ability to carry out ADLs; assess patient's baseline for ADL function and identify physical deficits which impact ability to perform ADLs (bathing, care of mouth/teeth, toileting, grooming, dressing, etc.)  - Assess/evaluate cause of self-care deficits   - Assess range of motion  - Assess patient's mobility; develop plan if impaired  - Assess patient's need for assistive devices and provide as appropriate  - Encourage maximum independence but intervene and supervise when necessary  - Involve family in performance of ADLs  - Assess for home care needs following discharge   - Consider OT consult to assist with ADL evaluation and planning for discharge  - Provide patient education as appropriate  Outcome: Progressing  Goal: Maintains/Returns to pre admission functional level  Description: INTERVENTIONS:  - Perform AM-PAC 6 Click Basic Mobility/ Daily Activity assessment daily.  - Set and communicate daily mobility goal to care team and patient/family/caregiver.    - Collaborate with rehabilitation services on mobility goals if consulted  - Reposition patient every 2 hours.  - Out of bed for toileting/meals  - Record patient progress and toleration of activity level   Outcome: Progressing     Problem: DISCHARGE PLANNING  Goal: Discharge to home or other facility with appropriate resources  Description: INTERVENTIONS:  - Identify barriers to discharge w/patient and caregiver  - Arrange for needed discharge resources and transportation as appropriate  - Identify discharge learning needs (meds, wound care, etc.)  - Refer to Case Management Department for coordinating discharge planning if the patient needs post-hospital services based on physician/advanced practitioner order or complex needs related to functional status, cognitive ability, or social support system  Outcome: Progressing     Problem: Knowledge Deficit  Goal: Patient/family/caregiver demonstrates understanding of disease process, treatment plan, medications, and discharge instructions  Description: Complete learning assessment and assess knowledge base.  Interventions:  - Provide teaching at level of understanding  - Provide teaching via preferred learning methods  Outcome: Progressing     Problem: METABOLIC, FLUID AND ELECTROLYTES - ADULT  Goal: Glucose maintained within target range  Description: INTERVENTIONS:  - Monitor Blood Glucose as ordered  - Assess for signs and symptoms of hyperglycemia and hypoglycemia  - Administer ordered medications to maintain glucose within target range  - Assess nutritional intake and initiate nutrition service referral as needed  Outcome: Progressing     Problem: SKIN/TISSUE INTEGRITY - ADULT  Goal: Skin Integrity remains intact(Skin Breakdown Prevention)  Description: Assess:  -Perform Nolan assessment every shift  -Clean and moisturize skin every shift  -Inspect skin when repositioning, toileting, and assisting with ADLS  -Assess under medical devices such as jacy every  shift  -Assess extremities for adequate circulation and sensation     Bed Management:  -Have minimal linens on bed & keep smooth, unwrinkled  -Change linens as needed when moist or perspiring  -Avoid sitting or lying in one position for more than 2 hours while in bed  -Keep HOB at 20 degrees     Toileting:  -Offer bedside commode  -Assess for incontinence every 2 hours  -Use incontinent care products after each incontinent episode such as hydraguard    Activity:  -Mobilize patient 3 times a day  -Encourage activity and walks on unit  -Encourage or provide ROM exercises   -Turn and reposition patient every 2 Hours  -Use appropriate equipment to lift or move patient in bed  -Instruct/ Assist with weight shifting every 30 min when out of bed in chair  -Consider limitation of chair time 2 hour intervals    Skin Care:  -Avoid use of baby powder, tape, friction and shearing, hot water or constrictive clothing  -Relieve pressure over bony prominences using hydraguard  -Do not massage red bony areas    Next Steps:  -Teach patient strategies to minimize risks such as frequent repositioning   -Consider consults to  interdisciplinary teams such as wound care  Outcome: Progressing  Goal: Pressure injury heals and does not worsen  Description: Interventions:  - Implement low air loss mattress or specialty surface (Criteria met)  - Apply silicone foam dressing  - Instruct/assist with weight shifting every 30 min minutes when in chair   - Limit chair time to 2 hour intervals  - Use special pressure reducing interventions such as cushion when in chair   - Apply fecal or urinary incontinence containment device   - Perform passive or active ROM every shift  - Turn and reposition patient & offload bony prominences every 2 hours   - Utilize friction reducing device or surface for transfers   - Consider consults to  interdisciplinary teams such as wound care  - Use incontinent care products after each incontinent episode such as  hydraguard  - Consider nutrition services referral as needed  Outcome: Progressing     Problem: MUSCULOSKELETAL - ADULT  Goal: Maintain or return mobility to safest level of function  Description: INTERVENTIONS:  - Assess patient's ability to carry out ADLs; assess patient's baseline for ADL function and identify physical deficits which impact ability to perform ADLs (bathing, care of mouth/teeth, toileting, grooming, dressing, etc.)  - Assess/evaluate cause of self-care deficits   - Assess range of motion  - Assess patient's mobility  - Assess patient's need for assistive devices and provide as appropriate  - Encourage maximum independence but intervene and supervise when necessary  - Involve family in performance of ADLs  - Assess for home care needs following discharge   - Consider OT consult to assist with ADL evaluation and planning for discharge  - Provide patient education as appropriate  Outcome: Progressing

## 2024-02-15 NOTE — PLAN OF CARE
Problem: Potential for Falls  Goal: Patient will remain free of falls  Description: INTERVENTIONS:  - Educate patient/family on patient safety including physical limitations  - Instruct patient to call for assistance with activity   - Consult OT/PT to assist with strengthening/mobility   - Keep Call bell within reach  - Keep bed low and locked with side rails adjusted as appropriate  - Keep care items and personal belongings within reach  - Initiate and maintain comfort rounds  - Make Fall Risk Sign visible to staff  - Offer Toileting every 2 Hours, in advance of need  - Initiate/Maintain alarms  - Obtain necessary fall risk management equipment:   - Apply yellow socks and bracelet for high fall risk patients  - Consider moving patient to room near nurses station  Outcome: Progressing     Problem: Prexisting or High Potential for Compromised Skin Integrity  Goal: Skin integrity is maintained or improved  Description: INTERVENTIONS:  - Identify patients at risk for skin breakdown  - Assess and monitor skin integrity  - Assess and monitor nutrition and hydration status  - Monitor labs   - Assess for incontinence   - Turn and reposition patient  - Assist with mobility/ambulation  - Relieve pressure over bony prominences  - Avoid friction and shearing  - Provide appropriate hygiene as needed including keeping skin clean and dry  - Evaluate need for skin moisturizer/barrier cream  - Collaborate with interdisciplinary team   - Patient/family teaching  - Consider wound care consult   Outcome: Progressing     Problem: PAIN - ADULT  Goal: Verbalizes/displays adequate comfort level or baseline comfort level  Description: Interventions:  - Encourage patient to monitor pain and request assistance  - Assess pain using appropriate pain scale  - Administer analgesics based on type and severity of pain and evaluate response  - Implement non-pharmacological measures as appropriate and evaluate response  - Consider cultural and  social influences on pain and pain management  - Notify physician/advanced practitioner if interventions unsuccessful or patient reports new pain  Outcome: Progressing     Problem: INFECTION - ADULT  Goal: Absence or prevention of progression during hospitalization  Description: INTERVENTIONS:  - Assess and monitor for signs and symptoms of infection  - Monitor lab/diagnostic results  - Monitor all insertion sites, i.e. indwelling lines, tubes, and drains  - Monitor endotracheal if appropriate and nasal secretions for changes in amount and color  - [place] appropriate cooling/warming therapies per order  - Administer medications as ordered  - Instruct and encourage patient and family to use good hand hygiene technique  - Identify and instruct in appropriate isolation precautions for identified infection/condition  Outcome: Progressing     Problem: SAFETY ADULT  Goal: Maintain or return to baseline ADL function  Description: INTERVENTIONS:  -  Assess patient's ability to carry out ADLs; assess patient's baseline for ADL function and identify physical deficits which impact ability to perform ADLs (bathing, care of mouth/teeth, toileting, grooming, dressing, etc.)  - Assess/evaluate cause of self-care deficits   - Assess range of motion  - Assess patient's mobility; develop plan if impaired  - Assess patient's need for assistive devices and provide as appropriate  - Encourage maximum independence but intervene and supervise when necessary  - Involve family in performance of ADLs  - Assess for home care needs following discharge   - Consider OT consult to assist with ADL evaluation and planning for discharge  - Provide patient education as appropriate  Outcome: Progressing  Goal: Maintains/Returns to pre admission functional level  Description: INTERVENTIONS:  - Perform AM-PAC 6 Click Basic Mobility/ Daily Activity assessment daily.  - Set and communicate daily mobility goal to care team and patient/family/caregiver.    - Collaborate with rehabilitation services on mobility goals if consulted  - Reposition patient every 2 hours.  - Out of bed for toileting/meals  - Record patient progress and toleration of activity level   Outcome: Progressing     Problem: DISCHARGE PLANNING  Goal: Discharge to home or other facility with appropriate resources  Description: INTERVENTIONS:  - Identify barriers to discharge w/patient and caregiver  - Arrange for needed discharge resources and transportation as appropriate  - Identify discharge learning needs (meds, wound care, etc.)  - Refer to Case Management Department for coordinating discharge planning if the patient needs post-hospital services based on physician/advanced practitioner order or complex needs related to functional status, cognitive ability, or social support system  Outcome: Progressing     Problem: Knowledge Deficit  Goal: Patient/family/caregiver demonstrates understanding of disease process, treatment plan, medications, and discharge instructions  Description: Complete learning assessment and assess knowledge base.  Interventions:  - Provide teaching at level of understanding  - Provide teaching via preferred learning methods  Outcome: Progressing     Problem: METABOLIC, FLUID AND ELECTROLYTES - ADULT  Goal: Glucose maintained within target range  Description: INTERVENTIONS:  - Monitor Blood Glucose as ordered  - Assess for signs and symptoms of hyperglycemia and hypoglycemia  - Administer ordered medications to maintain glucose within target range  - Assess nutritional intake and initiate nutrition service referral as needed  Outcome: Progressing     Problem: SKIN/TISSUE INTEGRITY - ADULT  Goal: Skin Integrity remains intact(Skin Breakdown Prevention)  Description: Assess:  -Perform Nolan assessment every shift  -Clean and moisturize skin every shift  -Inspect skin when repositioning, toileting, and assisting with ADLS  -Assess under medical devices such as jacy every  shift  -Assess extremities for adequate circulation and sensation     Bed Management:  -Have minimal linens on bed & keep smooth, unwrinkled  -Change linens as needed when moist or perspiring  -Avoid sitting or lying in one position for more than 2 hours while in bed  -Keep HOB at 20 degrees     Toileting:  -Offer bedside commode  -Assess for incontinence every 2 hours  -Use incontinent care products after each incontinent episode such as hydraguard    Activity:  -Mobilize patient 3 times a day  -Encourage activity and walks on unit  -Encourage or provide ROM exercises   -Turn and reposition patient every 2 Hours  -Use appropriate equipment to lift or move patient in bed  -Instruct/ Assist with weight shifting every 30 min when out of bed in chair  -Consider limitation of chair time 2 hour intervals    Skin Care:  -Avoid use of baby powder, tape, friction and shearing, hot water or constrictive clothing  -Relieve pressure over bony prominences using hydraguard  -Do not massage red bony areas    Next Steps:  -Teach patient strategies to minimize risks such as frequent repositioning   -Consider consults to  interdisciplinary teams such as wound care  Outcome: Progressing  Goal: Pressure injury heals and does not worsen  Description: Interventions:  - Implement low air loss mattress or specialty surface (Criteria met)  - Apply silicone foam dressing  - Instruct/assist with weight shifting every 30 min minutes when in chair   - Limit chair time to 2 hour intervals  - Use special pressure reducing interventions such as cushion when in chair   - Apply fecal or urinary incontinence containment device   - Perform passive or active ROM every shift  - Turn and reposition patient & offload bony prominences every 2 hours   - Utilize friction reducing device or surface for transfers   - Consider consults to  interdisciplinary teams such as wound care  - Use incontinent care products after each incontinent episode such as  hydraguard  - Consider nutrition services referral as needed  Outcome: Progressing     Problem: MUSCULOSKELETAL - ADULT  Goal: Maintain or return mobility to safest level of function  Description: INTERVENTIONS:  - Assess patient's ability to carry out ADLs; assess patient's baseline for ADL function and identify physical deficits which impact ability to perform ADLs (bathing, care of mouth/teeth, toileting, grooming, dressing, etc.)  - Assess/evaluate cause of self-care deficits   - Assess range of motion  - Assess patient's mobility  - Assess patient's need for assistive devices and provide as appropriate  - Encourage maximum independence but intervene and supervise when necessary  - Involve family in performance of ADLs  - Assess for home care needs following discharge   - Consider OT consult to assist with ADL evaluation and planning for discharge  - Provide patient education as appropriate  Outcome: Progressing

## 2024-02-15 NOTE — ASSESSMENT & PLAN NOTE
With impulsive behavior at times  Continue Seroquel 200 mg at 8 AM and 2 PM and 400 mg at bedtime  Supportive care  Nursing staff reported patient with agitation  Psychiatry consultation appreciated- will discontinue benztropine and decreased hydroxyzine to 10 mg twice daily as patient may be responding adversely to anticholinergic effects. Will increase gabapentin to 400 mg po 3 times daily as mood stabilizer. Add prn Zyprexa 5 mg po every 6 hours prn

## 2024-02-16 ENCOUNTER — APPOINTMENT (OUTPATIENT)
Dept: OCCUPATIONAL THERAPY | Facility: CLINIC | Age: 59
End: 2024-02-16
Payer: MEDICARE

## 2024-02-16 ENCOUNTER — TELEPHONE (OUTPATIENT)
Dept: PSYCHIATRY | Facility: CLINIC | Age: 59
End: 2024-02-16

## 2024-02-16 LAB
GLUCOSE SERPL-MCNC: 149 MG/DL (ref 65–140)
GLUCOSE SERPL-MCNC: 152 MG/DL (ref 65–140)
GLUCOSE SERPL-MCNC: 169 MG/DL (ref 65–140)
GLUCOSE SERPL-MCNC: 196 MG/DL (ref 65–140)

## 2024-02-16 PROCEDURE — 99232 SBSQ HOSP IP/OBS MODERATE 35: CPT | Performed by: PHYSICIAN ASSISTANT

## 2024-02-16 PROCEDURE — 97112 NEUROMUSCULAR REEDUCATION: CPT

## 2024-02-16 PROCEDURE — 97535 SELF CARE MNGMENT TRAINING: CPT

## 2024-02-16 PROCEDURE — G0408 INPT/TELE FOLLOW UP 35: HCPCS | Performed by: PSYCHIATRY & NEUROLOGY

## 2024-02-16 PROCEDURE — 97530 THERAPEUTIC ACTIVITIES: CPT

## 2024-02-16 PROCEDURE — 82948 REAGENT STRIP/BLOOD GLUCOSE: CPT

## 2024-02-16 PROCEDURE — 97110 THERAPEUTIC EXERCISES: CPT

## 2024-02-16 RX ORDER — GABAPENTIN 400 MG/1
400 CAPSULE ORAL 3 TIMES DAILY
Status: DISCONTINUED | OUTPATIENT
Start: 2024-02-16 | End: 2024-02-22 | Stop reason: HOSPADM

## 2024-02-16 RX ORDER — OLANZAPINE 5 MG/1
5 TABLET ORAL EVERY 6 HOURS PRN
Status: DISCONTINUED | OUTPATIENT
Start: 2024-02-16 | End: 2024-02-21

## 2024-02-16 RX ORDER — OLANZAPINE 5 MG/1
5 TABLET ORAL EVERY 4 HOURS PRN
Status: DISCONTINUED | OUTPATIENT
Start: 2024-02-16 | End: 2024-02-16

## 2024-02-16 RX ORDER — HYDROXYZINE HYDROCHLORIDE 10 MG/1
10 TABLET, FILM COATED ORAL 2 TIMES DAILY
Status: DISCONTINUED | OUTPATIENT
Start: 2024-02-17 | End: 2024-02-22 | Stop reason: HOSPADM

## 2024-02-16 RX ADMIN — LEVETIRACETAM 1500 MG: 500 TABLET, FILM COATED ORAL at 08:42

## 2024-02-16 RX ADMIN — BENZTROPINE MESYLATE 0.5 MG: 0.5 TABLET ORAL at 17:24

## 2024-02-16 RX ADMIN — B-COMPLEX W/ C & FOLIC ACID TAB 1 TABLET: TAB at 08:43

## 2024-02-16 RX ADMIN — CALCIUM 1 TABLET: 500 TABLET ORAL at 17:24

## 2024-02-16 RX ADMIN — GABAPENTIN 300 MG: 300 CAPSULE ORAL at 17:24

## 2024-02-16 RX ADMIN — PRAVASTATIN SODIUM 80 MG: 40 TABLET ORAL at 17:24

## 2024-02-16 RX ADMIN — QUETIAPINE 400 MG: 400 TABLET, FILM COATED, EXTENDED RELEASE ORAL at 21:41

## 2024-02-16 RX ADMIN — GABAPENTIN 300 MG: 300 CAPSULE ORAL at 08:43

## 2024-02-16 RX ADMIN — INSULIN LISPRO 1 UNITS: 100 INJECTION, SOLUTION INTRAVENOUS; SUBCUTANEOUS at 21:43

## 2024-02-16 RX ADMIN — TEMAZEPAM 15 MG: 7.5 CAPSULE ORAL at 00:49

## 2024-02-16 RX ADMIN — LACOSAMIDE 150 MG: 150 TABLET ORAL at 08:42

## 2024-02-16 RX ADMIN — HEPARIN SODIUM 5000 UNITS: 5000 INJECTION INTRAVENOUS; SUBCUTANEOUS at 06:21

## 2024-02-16 RX ADMIN — OLANZAPINE 5 MG: 5 TABLET, FILM COATED ORAL at 11:17

## 2024-02-16 RX ADMIN — LACOSAMIDE 200 MG: 150 TABLET ORAL at 21:41

## 2024-02-16 RX ADMIN — QUETIAPINE 200 MG: 400 TABLET, FILM COATED, EXTENDED RELEASE ORAL at 08:54

## 2024-02-16 RX ADMIN — GABAPENTIN 400 MG: 400 CAPSULE ORAL at 21:41

## 2024-02-16 RX ADMIN — DOCUSATE SODIUM 100 MG: 100 CAPSULE, LIQUID FILLED ORAL at 08:43

## 2024-02-16 RX ADMIN — CHOLECALCIFEROL TAB 25 MCG (1000 UNIT) 1000 UNITS: 25 TAB at 08:43

## 2024-02-16 RX ADMIN — HEPARIN SODIUM 5000 UNITS: 5000 INJECTION INTRAVENOUS; SUBCUTANEOUS at 13:51

## 2024-02-16 RX ADMIN — ZONISAMIDE 300 MG: 100 CAPSULE ORAL at 21:41

## 2024-02-16 RX ADMIN — LORATADINE 10 MG: 10 TABLET ORAL at 08:43

## 2024-02-16 RX ADMIN — LEVOTHYROXINE SODIUM 150 MCG: 75 TABLET ORAL at 06:20

## 2024-02-16 RX ADMIN — HYDROXYZINE HYDROCHLORIDE 25 MG: 50 TABLET, FILM COATED ORAL at 13:52

## 2024-02-16 RX ADMIN — HEPARIN SODIUM 5000 UNITS: 5000 INJECTION INTRAVENOUS; SUBCUTANEOUS at 21:42

## 2024-02-16 RX ADMIN — LISINOPRIL 10 MG: 10 TABLET ORAL at 08:43

## 2024-02-16 RX ADMIN — DIVALPROEX SODIUM 1000 MG: 500 TABLET, DELAYED RELEASE ORAL at 21:42

## 2024-02-16 RX ADMIN — DOCUSATE SODIUM 100 MG: 100 CAPSULE, LIQUID FILLED ORAL at 17:24

## 2024-02-16 RX ADMIN — INSULIN LISPRO 1 UNITS: 100 INJECTION, SOLUTION INTRAVENOUS; SUBCUTANEOUS at 08:57

## 2024-02-16 RX ADMIN — DIVALPROEX SODIUM 750 MG: 250 TABLET, DELAYED RELEASE ORAL at 08:43

## 2024-02-16 RX ADMIN — LEVETIRACETAM 1500 MG: 500 TABLET, FILM COATED ORAL at 21:42

## 2024-02-16 RX ADMIN — INSULIN LISPRO 1 UNITS: 100 INJECTION, SOLUTION INTRAVENOUS; SUBCUTANEOUS at 17:27

## 2024-02-16 RX ADMIN — BENZTROPINE MESYLATE 0.5 MG: 0.5 TABLET ORAL at 08:43

## 2024-02-16 RX ADMIN — CALCIUM 1 TABLET: 500 TABLET ORAL at 21:41

## 2024-02-16 RX ADMIN — HYDROXYZINE HYDROCHLORIDE 25 MG: 50 TABLET, FILM COATED ORAL at 08:43

## 2024-02-16 RX ADMIN — QUETIAPINE 200 MG: 400 TABLET, FILM COATED, EXTENDED RELEASE ORAL at 13:52

## 2024-02-16 RX ADMIN — CALCIUM 1 TABLET: 500 TABLET ORAL at 08:43

## 2024-02-16 NOTE — NUTRITION
02/16/24 1445   Biochemical Data,Medical Tests, and Procedures   Biochemical Data/Medical Tests/Procedures Lab values reviewed;Meds reviewed   Labs (Comment) 2/16/2024 glucose 149   Meds (Comment) Cogentin, calcium carbonate, vitamin D3, vitamin B12, Depakote, Colace, heparin, insulin, Keppra, levothyroxine, lisinopril, MVI, Seroquel   Nutrition-Focused Physical Exam   Nutrition-Focused Physical Exam Findings RN skin assessment reviewed  (Trace BL LE edema.  Abrasion at right knee, abrasion at left knee, abrasion at right head, abrasion at left back.)   Medical-Related Concerns PMH reviewed   Adequacy of Intake   Nutrition Modality PO   Feeding Route   PO With assistance   Current PO Intake   Current Diet Order Regular diet, thin liquids   Current Meal Intake %   Estimated calorie intake compared to estimated need Nutrient needs are met   PES Statement   Problem No nutrition diagnosis   Recommendations/Interventions   Malnutrition/BMI Present No   Summary Nutrition follow-up assessment.  Patient noted to be medically stable for discharge, discharge planning continues.  Prescribed a regular diet, thin liquids.  Requires assistance at meals.  Meal completion 100% consistently.  Nutrient needs are met.  Noted to have a good appetite per flowsheet documentation.  Per nursing patient is eating well.  Behaviors noted per chart review.  Will adjust diet to regular, finger foods, disposable meal.  RD continues following.   Interventions/Recommendations Adjust diet order   Education Assessment   Education Patient/caregiver not appropriate for education at this time

## 2024-02-16 NOTE — TELEPHONE ENCOUNTER
Consult placed on [facility] queue at 1125 to be seen by an [facility] psychiatrist.
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
## 2024-02-16 NOTE — PLAN OF CARE
Problem: Potential for Falls  Goal: Patient will remain free of falls  Description: INTERVENTIONS:  - Educate patient/family on patient safety including physical limitations  - Instruct patient to call for assistance with activity   - Consult OT/PT to assist with strengthening/mobility   - Keep Call bell within reach  - Keep bed low and locked with side rails adjusted as appropriate  - Keep care items and personal belongings within reach  - Initiate and maintain comfort rounds  - Make Fall Risk Sign visible to staff  - Offer Toileting every 2 Hours, in advance of need  - Initiate/Maintain alarms  - Obtain necessary fall risk management equipment:   - Apply yellow socks and bracelet for high fall risk patients  - Consider moving patient to room near nurses station  Outcome: Progressing     Problem: Prexisting or High Potential for Compromised Skin Integrity  Goal: Skin integrity is maintained or improved  Description: INTERVENTIONS:  - Identify patients at risk for skin breakdown  - Assess and monitor skin integrity  - Assess and monitor nutrition and hydration status  - Monitor labs   - Assess for incontinence   - Turn and reposition patient  - Assist with mobility/ambulation  - Relieve pressure over bony prominences  - Avoid friction and shearing  - Provide appropriate hygiene as needed including keeping skin clean and dry  - Evaluate need for skin moisturizer/barrier cream  - Collaborate with interdisciplinary team   - Patient/family teaching  - Consider wound care consult   Outcome: Progressing     Problem: PAIN - ADULT  Goal: Verbalizes/displays adequate comfort level or baseline comfort level  Description: Interventions:  - Encourage patient to monitor pain and request assistance  - Assess pain using appropriate pain scale  - Administer analgesics based on type and severity of pain and evaluate response  - Implement non-pharmacological measures as appropriate and evaluate response  - Consider cultural and  social influences on pain and pain management  - Notify physician/advanced practitioner if interventions unsuccessful or patient reports new pain  Outcome: Progressing     Problem: INFECTION - ADULT  Goal: Absence or prevention of progression during hospitalization  Description: INTERVENTIONS:  - Assess and monitor for signs and symptoms of infection  - Monitor lab/diagnostic results  - Monitor all insertion sites, i.e. indwelling lines, tubes, and drains  - Monitor endotracheal if appropriate and nasal secretions for changes in amount and color  - [place] appropriate cooling/warming therapies per order  - Administer medications as ordered  - Instruct and encourage patient and family to use good hand hygiene technique  - Identify and instruct in appropriate isolation precautions for identified infection/condition  Outcome: Progressing     Problem: SAFETY ADULT  Goal: Maintain or return to baseline ADL function  Description: INTERVENTIONS:  -  Assess patient's ability to carry out ADLs; assess patient's baseline for ADL function and identify physical deficits which impact ability to perform ADLs (bathing, care of mouth/teeth, toileting, grooming, dressing, etc.)  - Assess/evaluate cause of self-care deficits   - Assess range of motion  - Assess patient's mobility; develop plan if impaired  - Assess patient's need for assistive devices and provide as appropriate  - Encourage maximum independence but intervene and supervise when necessary  - Involve family in performance of ADLs  - Assess for home care needs following discharge   - Consider OT consult to assist with ADL evaluation and planning for discharge  - Provide patient education as appropriate  Outcome: Progressing  Goal: Maintains/Returns to pre admission functional level  Description: INTERVENTIONS:  - Perform AM-PAC 6 Click Basic Mobility/ Daily Activity assessment daily.  - Set and communicate daily mobility goal to care team and patient/family/caregiver.    - Collaborate with rehabilitation services on mobility goals if consulted  - Reposition patient every 2 hours.  - Out of bed for toileting/meals  - Record patient progress and toleration of activity level   Outcome: Progressing     Problem: DISCHARGE PLANNING  Goal: Discharge to home or other facility with appropriate resources  Description: INTERVENTIONS:  - Identify barriers to discharge w/patient and caregiver  - Arrange for needed discharge resources and transportation as appropriate  - Identify discharge learning needs (meds, wound care, etc.)  - Refer to Case Management Department for coordinating discharge planning if the patient needs post-hospital services based on physician/advanced practitioner order or complex needs related to functional status, cognitive ability, or social support system  Outcome: Progressing     Problem: Knowledge Deficit  Goal: Patient/family/caregiver demonstrates understanding of disease process, treatment plan, medications, and discharge instructions  Description: Complete learning assessment and assess knowledge base.  Interventions:  - Provide teaching at level of understanding  - Provide teaching via preferred learning methods  Outcome: Progressing     Problem: METABOLIC, FLUID AND ELECTROLYTES - ADULT  Goal: Glucose maintained within target range  Description: INTERVENTIONS:  - Monitor Blood Glucose as ordered  - Assess for signs and symptoms of hyperglycemia and hypoglycemia  - Administer ordered medications to maintain glucose within target range  - Assess nutritional intake and initiate nutrition service referral as needed  Outcome: Progressing     Problem: SKIN/TISSUE INTEGRITY - ADULT  Goal: Skin Integrity remains intact(Skin Breakdown Prevention)  Description: Assess:  -Perform Nolan assessment every shift  -Clean and moisturize skin every shift  -Inspect skin when repositioning, toileting, and assisting with ADLS  -Assess under medical devices such as jacy every  shift  -Assess extremities for adequate circulation and sensation     Bed Management:  -Have minimal linens on bed & keep smooth, unwrinkled  -Change linens as needed when moist or perspiring  -Avoid sitting or lying in one position for more than 2 hours while in bed  -Keep HOB at 20 degrees     Toileting:  -Offer bedside commode  -Assess for incontinence every 2 hours  -Use incontinent care products after each incontinent episode such as hydraguard    Activity:  -Mobilize patient 3 times a day  -Encourage activity and walks on unit  -Encourage or provide ROM exercises   -Turn and reposition patient every 2 Hours  -Use appropriate equipment to lift or move patient in bed  -Instruct/ Assist with weight shifting every 30 min when out of bed in chair  -Consider limitation of chair time 2 hour intervals    Skin Care:  -Avoid use of baby powder, tape, friction and shearing, hot water or constrictive clothing  -Relieve pressure over bony prominences using hydraguard  -Do not massage red bony areas    Next Steps:  -Teach patient strategies to minimize risks such as frequent repositioning   -Consider consults to  interdisciplinary teams such as wound care  Outcome: Progressing  Goal: Pressure injury heals and does not worsen  Description: Interventions:  - Implement low air loss mattress or specialty surface (Criteria met)  - Apply silicone foam dressing  - Instruct/assist with weight shifting every 30 min minutes when in chair   - Limit chair time to 2 hour intervals  - Use special pressure reducing interventions such as cushion when in chair   - Apply fecal or urinary incontinence containment device   - Perform passive or active ROM every shift  - Turn and reposition patient & offload bony prominences every 2 hours   - Utilize friction reducing device or surface for transfers   - Consider consults to  interdisciplinary teams such as wound care  - Use incontinent care products after each incontinent episode such as  hydraguard  - Consider nutrition services referral as needed  Outcome: Progressing     Problem: MUSCULOSKELETAL - ADULT  Goal: Maintain or return mobility to safest level of function  Description: INTERVENTIONS:  - Assess patient's ability to carry out ADLs; assess patient's baseline for ADL function and identify physical deficits which impact ability to perform ADLs (bathing, care of mouth/teeth, toileting, grooming, dressing, etc.)  - Assess/evaluate cause of self-care deficits   - Assess range of motion  - Assess patient's mobility  - Assess patient's need for assistive devices and provide as appropriate  - Encourage maximum independence but intervene and supervise when necessary  - Involve family in performance of ADLs  - Assess for home care needs following discharge   - Consider OT consult to assist with ADL evaluation and planning for discharge  - Provide patient education as appropriate  Outcome: Progressing
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
## 2024-02-16 NOTE — PLAN OF CARE
Problem: PHYSICAL THERAPY ADULT  Goal: Performs mobility at highest level of function for planned discharge setting.  See evaluation for individualized goals.  Description: Treatment/Interventions: Functional transfer training, LE strengthening/ROM, Therapeutic exercise, Endurance training, Cognitive reorientation, Patient/family training, Equipment eval/education, Bed mobility, Gait training, Spoke to nursing, Spoke to case management, OT  Equipment Recommended:  (continue TBD pending progress, RW at this time)       See flowsheet documentation for full assessment, interventions and recommendations.  Outcome: Not Progressing  Note: Prognosis: Fair  Problem List: Decreased strength, Decreased endurance, Impaired balance, Decreased mobility, Decreased coordination, Decreased cognition, Impaired judgement, Decreased safety awareness, Orthopedic restrictions, Pain  Assessment: Pt seen for PT treatment session this date, consisting of ther act focused on bed mobility and postural control activities sitting at EOB and dynamic balance training in order to increase safety in home. Since previous session, pt has made minimal progress in terms of progression of mobility attempts. Pertinent barriers during this session include cognitive status, awareness, impulsivity, and intermittent agitation . Current goals and POC established on IE remain appropriate, pt continues to have rehab potential and is making minimal progress towards STGs. Pt prognosis for achieving goals is fair, pending pt progress with hospitalization/medical status improvements, and indicated by previous response to intervention. Pt limited d/t poor orientation, inability to concentrate under maximum structure, avoidance behaviors, lack of ability to demonstrate mobility and/or self-care activities, and agitation . Pt continues to be functioning below baseline level, and remains limited 2* factors listed above. PT will continue to see pt during current  hospitalization in order to address the deficits listed above and provide interventions consistent w/ POC in effort to achieve STGs. PT recommends level 2, moderate resource intensity upon discharge.  Barriers to Discharge: Inaccessible home environment     Rehab Resource Intensity Level, PT: II (Moderate Resource Intensity)    See flowsheet documentation for full assessment.

## 2024-02-16 NOTE — PHYSICAL THERAPY NOTE
"Physical Therapy Treatment Note       02/16/24 0953   PT Last Visit   PT Visit Date 02/16/24   Note Type   Note Type Treatment   Pain Assessment   Pain Assessment Tool FLACC   Pain Rating: FLACC (Rest) - Face 0   Pain Rating: FLACC (Rest) - Legs 0   Pain Rating: FLACC (Rest) - Activity 0   Pain Rating: FLACC (Rest) - Cry 0   Pain Rating: FLACC (Rest) - Consolability 0   Score: FLACC (Rest) 0   Pain Rating: FLACC (Activity) - Face 0   Pain Rating: FLACC (Activity) - Legs 0   Pain Rating: FLACC (Activity) - Activity 0   Pain Rating: FLACC (Activity) - Cry 0   Pain Rating: FLACC (Activity) - Consolability 0   Score: FLACC (Activity) 0   Restrictions/Precautions   Weight Bearing Precautions Per Order Yes   RLE Weight Bearing Per Order WBAT  (+ no active hip ABDuction)   Other Precautions Cognitive;Bed Alarm;Agitated;Impulsive;Fall Risk;Pain   General   Chart Reviewed Yes   Response to Previous Treatment Patient unable to report, no changes reported from family or staff  (periods of agitation per nsg report)   Family/Caregiver Present No   Cognition   Overall Cognitive Status Impaired   Arousal/Participation Alert   Attention Difficulty attending to directions   Orientation Level Oriented to person   Memory Unable to assess   Comments initially upon approach pt appears agreeable to attempting to mobilize OOB, however with further attempt pt became agitated and threw cup at this writer. PT attempted to terminate session, upon leaving the room pt yelling \"come back\". Upon re-approach pt did not display any visible agitation or aggression towards this writer and OT at bedside. Pt participated in remainder of session, with no further attempts for OOB mobility and participated in bed level activities only.   Subjective   Subjective \"come here\"   Bed Mobility   Supine to Sit 5  Supervision   Additional items Assist x 1;HOB elevated;Increased time required   Sit to Supine 5  Supervision   Additional items Assist x 1;Increased " time required   Additional Comments pt tolerated EOB dangling x 15 mins unsupported with no overt LOB witnessed. Dynamic sitting balance tasks with UB reaching performed.   Transfers   Sit to Stand Unable to assess  (PT deferred d/t pt behaviors and intermittent agitation; safey concerns)   Balance   Static Sitting Good  (long sit in bed and EOB dangling)   Dynamic Sitting Fair +   Static Standing   (DNT)   Endurance Deficit   Endurance Deficit Yes   Activity Tolerance   Activity Tolerance Treatment limited secondary to agitation   Medical Staff Made Aware coordination of care provided with OT Riri for safety   Nurse Made Aware Yes, RN Rangel   Exercises   Neuro re-ed Patient performed dynamic sitting balance activities while sitting at EOB unsupported with close S by PT & OT staff; EOB activities including multidirectional weight shifting, reaching, and scooting.   Assessment   Prognosis Fair   Problem List Decreased strength;Decreased endurance;Impaired balance;Decreased mobility;Decreased coordination;Decreased cognition;Impaired judgement;Decreased safety awareness;Orthopedic restrictions;Pain   Assessment Pt seen for PT treatment session this date, consisting of ther act focused on bed mobility and postural control activities sitting at EOB and dynamic balance training in order to increase safety in home. Since previous session, pt has made minimal progress in terms of progression of mobility attempts. Pertinent barriers during this session include cognitive status, awareness, impulsivity, and intermittent agitation . Current goals and POC established on IE remain appropriate, pt continues to have rehab potential and is making minimal progress towards STGs. Pt prognosis for achieving goals is fair, pending pt progress with hospitalization/medical status improvements, and indicated by previous response to intervention. Pt limited d/t poor orientation, inability to concentrate under maximum structure, avoidance  behaviors, lack of ability to demonstrate mobility and/or self-care activities, and agitation . Pt continues to be functioning below baseline level, and remains limited 2* factors listed above. PT will continue to see pt during current hospitalization in order to address the deficits listed above and provide interventions consistent w/ POC in effort to achieve STGs. PT recommends level 2, moderate resource intensity upon discharge.   Barriers to Discharge Inaccessible home environment   Goals   Patient Goals none expressed by pt d/t baseline cognitive status   STG Expiration Date 02/26/24   Short Term Goal #1 goals remain appropriate   PT Treatment Day 5   Plan   Treatment/Interventions Functional transfer training;LE strengthening/ROM;Therapeutic exercise;Endurance training;Cognitive reorientation;Patient/family training;Equipment eval/education;Bed mobility;Gait training;Spoke to nursing;Spoke to case management;OT   Progress Slow progress, cognitive deficits   PT Frequency 3-5x/wk  (monitor status)   Discharge Recommendation   Rehab Resource Intensity Level, PT II (Moderate Resource Intensity)   Equipment Recommended   (TBD pending pt progress)   Walker Package Recommended Wheeled walker   Additional Comments Pt's raw score on the AM-PAC Basic Mobility inpatient short form is 12, standardized score is 32.23. Patients at this level are likely to benefit from DC to post acute rehabilitation services, however, please refer to therapist recommendation for safe DC planning.   AM-PAC Basic Mobility Inpatient   Turning in Flat Bed Without Bedrails 3   Lying on Back to Sitting on Edge of Flat Bed Without Bedrails 3   Moving Bed to Chair 2   Standing Up From Chair Using Arms 2   Walk in Room 1   Climb 3-5 Stairs With Railing 1   Basic Mobility Inpatient Raw Score 12   Basic Mobility Standardized Score 32.23   Highest Level Of Mobility   JH-HLM Goal 4: Move to chair/commode   JH-HLM Achieved 3: Sit at edge of bed    Education   Education Provided Mobility training   Patient Reinforcement needed   End of Consult   Patient Position at End of Consult Supine;Bed/Chair alarm activated;All needs within reach  (pt long sitting in bed)       Annabel Dorado, PT, DPT    Time of PT treatment session: 0915-0953 (total treatment time = 38 minutes)

## 2024-02-16 NOTE — PLAN OF CARE
Problem: OCCUPATIONAL THERAPY ADULT  Goal: Performs self-care activities at highest level of function for planned discharge setting.  See evaluation for individualized goals.  Description: Treatment Interventions: ADL retraining, Functional transfer training, UE strengthening/ROM, Endurance training, Patient/family training, Equipment evaluation/education, Compensatory technique education, Energy conservation, Activityengagement, Cognitive reorientation    See flowsheet documentation for full assessment, interventions and recommendations.   Note: Limitation: Decreased ADL status, Decreased UE strength, Decreased Safe judgement during ADL, Decreased endurance, Decreased self-care trans, Decreased high-level ADLs, Decreased cognition  Prognosis: Fair  Assessment: Patient participated in Skilled OT session this date with interventions consisting of therapeutic exercise to: increase functional use of BUEs, increase BUE muscle strength ,  therapeutic activities to: increase activity tolerance, increase postural control, increase trunk control, and increase OOB/ sitting tolerance . Patient agreeable to OT treatment session, upon arrival patient was found supine in bed.  In comparison to previous session, patient with improvements in activity tolerance and UE exercises/activities . Patient requiring verbal cues for safety, verbal cues for correct technique, and frequent rest periods. Patient continues to be functioning below baseline level, occupational performance remains limited secondary to factors listed above and increased risk for falls and injury.   From OT standpoint, recommendation at time of d/c would with moderate intensity OT resources.   Patient to benefit from continued Occupational Therapy treatment while in the hospital to address deficits as defined above and maximize level of functional independence with ADLs and functional mobility.     Rehab Resource Intensity Level, OT: II (Moderate Resource  Intensity)     Riri Pineda MS, OTR/L

## 2024-02-17 LAB
GLUCOSE SERPL-MCNC: 137 MG/DL (ref 65–140)
GLUCOSE SERPL-MCNC: 155 MG/DL (ref 65–140)
GLUCOSE SERPL-MCNC: 168 MG/DL (ref 65–140)
GLUCOSE SERPL-MCNC: 169 MG/DL (ref 65–140)

## 2024-02-17 PROCEDURE — 99232 SBSQ HOSP IP/OBS MODERATE 35: CPT | Performed by: PHYSICIAN ASSISTANT

## 2024-02-17 PROCEDURE — 82948 REAGENT STRIP/BLOOD GLUCOSE: CPT

## 2024-02-17 RX ORDER — LACTULOSE 10 G/15ML
20 SOLUTION ORAL ONCE
Status: COMPLETED | OUTPATIENT
Start: 2024-02-17 | End: 2024-02-17

## 2024-02-17 RX ADMIN — HYDROXYZINE HYDROCHLORIDE 10 MG: 10 TABLET ORAL at 09:51

## 2024-02-17 RX ADMIN — GABAPENTIN 400 MG: 400 CAPSULE ORAL at 16:17

## 2024-02-17 RX ADMIN — CALCIUM 1 TABLET: 500 TABLET ORAL at 21:24

## 2024-02-17 RX ADMIN — DIVALPROEX SODIUM 1000 MG: 500 TABLET, DELAYED RELEASE ORAL at 21:24

## 2024-02-17 RX ADMIN — PRAVASTATIN SODIUM 80 MG: 40 TABLET ORAL at 16:17

## 2024-02-17 RX ADMIN — INSULIN LISPRO 1 UNITS: 100 INJECTION, SOLUTION INTRAVENOUS; SUBCUTANEOUS at 08:44

## 2024-02-17 RX ADMIN — GABAPENTIN 400 MG: 400 CAPSULE ORAL at 21:24

## 2024-02-17 RX ADMIN — HYDROXYZINE HYDROCHLORIDE 10 MG: 10 TABLET ORAL at 14:49

## 2024-02-17 RX ADMIN — ACETAMINOPHEN 650 MG: 325 TABLET ORAL at 18:14

## 2024-02-17 RX ADMIN — DOCUSATE SODIUM 100 MG: 100 CAPSULE, LIQUID FILLED ORAL at 17:33

## 2024-02-17 RX ADMIN — CHOLECALCIFEROL TAB 25 MCG (1000 UNIT) 1000 UNITS: 25 TAB at 08:48

## 2024-02-17 RX ADMIN — HEPARIN SODIUM 5000 UNITS: 5000 INJECTION INTRAVENOUS; SUBCUTANEOUS at 06:07

## 2024-02-17 RX ADMIN — QUETIAPINE 200 MG: 400 TABLET, FILM COATED, EXTENDED RELEASE ORAL at 08:45

## 2024-02-17 RX ADMIN — HEPARIN SODIUM 5000 UNITS: 5000 INJECTION INTRAVENOUS; SUBCUTANEOUS at 21:25

## 2024-02-17 RX ADMIN — B-COMPLEX W/ C & FOLIC ACID TAB 1 TABLET: TAB at 08:47

## 2024-02-17 RX ADMIN — LEVOTHYROXINE SODIUM 150 MCG: 75 TABLET ORAL at 06:07

## 2024-02-17 RX ADMIN — CALCIUM 1 TABLET: 500 TABLET ORAL at 08:47

## 2024-02-17 RX ADMIN — OLANZAPINE 5 MG: 5 TABLET, FILM COATED ORAL at 09:48

## 2024-02-17 RX ADMIN — DIVALPROEX SODIUM 750 MG: 250 TABLET, DELAYED RELEASE ORAL at 08:46

## 2024-02-17 RX ADMIN — LEVETIRACETAM 1500 MG: 500 TABLET, FILM COATED ORAL at 08:45

## 2024-02-17 RX ADMIN — CYANOCOBALAMIN TAB 500 MCG 1000 MCG: 500 TAB at 08:47

## 2024-02-17 RX ADMIN — HEPARIN SODIUM 5000 UNITS: 5000 INJECTION INTRAVENOUS; SUBCUTANEOUS at 14:49

## 2024-02-17 RX ADMIN — LACTULOSE 20 G: 20 SOLUTION ORAL at 09:48

## 2024-02-17 RX ADMIN — DICLOFENAC SODIUM 2 G: 10 GEL TOPICAL at 11:32

## 2024-02-17 RX ADMIN — CALCIUM 1 TABLET: 500 TABLET ORAL at 16:17

## 2024-02-17 RX ADMIN — LEVETIRACETAM 1500 MG: 500 TABLET, FILM COATED ORAL at 21:25

## 2024-02-17 RX ADMIN — DICLOFENAC SODIUM 2 G: 10 GEL TOPICAL at 08:47

## 2024-02-17 RX ADMIN — QUETIAPINE 400 MG: 400 TABLET, FILM COATED, EXTENDED RELEASE ORAL at 21:24

## 2024-02-17 RX ADMIN — LISINOPRIL 10 MG: 10 TABLET ORAL at 08:47

## 2024-02-17 RX ADMIN — DOCUSATE SODIUM 100 MG: 100 CAPSULE, LIQUID FILLED ORAL at 08:47

## 2024-02-17 RX ADMIN — INSULIN LISPRO 1 UNITS: 100 INJECTION, SOLUTION INTRAVENOUS; SUBCUTANEOUS at 11:32

## 2024-02-17 RX ADMIN — LACOSAMIDE 200 MG: 150 TABLET ORAL at 21:25

## 2024-02-17 RX ADMIN — DICLOFENAC SODIUM 2 G: 10 GEL TOPICAL at 17:34

## 2024-02-17 RX ADMIN — LORATADINE 10 MG: 10 TABLET ORAL at 08:46

## 2024-02-17 RX ADMIN — GABAPENTIN 400 MG: 400 CAPSULE ORAL at 08:46

## 2024-02-17 RX ADMIN — QUETIAPINE 200 MG: 400 TABLET, FILM COATED, EXTENDED RELEASE ORAL at 14:48

## 2024-02-17 RX ADMIN — ZONISAMIDE 300 MG: 100 CAPSULE ORAL at 21:24

## 2024-02-17 RX ADMIN — INSULIN LISPRO 1 UNITS: 100 INJECTION, SOLUTION INTRAVENOUS; SUBCUTANEOUS at 21:59

## 2024-02-17 RX ADMIN — LACOSAMIDE 150 MG: 150 TABLET ORAL at 08:47

## 2024-02-17 NOTE — PLAN OF CARE
Problem: MUSCULOSKELETAL - ADULT  Goal: Maintain or return mobility to safest level of function  Description: INTERVENTIONS:  - Assess patient's ability to carry out ADLs; assess patient's baseline for ADL function and identify physical deficits which impact ability to perform ADLs (bathing, care of mouth/teeth, toileting, grooming, dressing, etc.)  - Assess/evaluate cause of self-care deficits   - Assess range of motion  - Assess patient's mobility  - Assess patient's need for assistive devices and provide as appropriate  - Encourage maximum independence but intervene and supervise when necessary  - Involve family in performance of ADLs  - Assess for home care needs following discharge   - Consider OT consult to assist with ADL evaluation and planning for discharge  - Provide patient education as appropriate  Encourage ambulation   Outcome: Progressing

## 2024-02-17 NOTE — ASSESSMENT & PLAN NOTE
Lab Results   Component Value Date    HGBA1C 5.5 11/27/2023       Recent Labs     02/16/24  1630 02/16/24  2117 02/17/24  0651 02/17/24  1111   POCGLU 169* 196* 169* 168*         Blood Sugar Average: Last 72 hrs:  (P) [id number]  Oral hypoglycemic medications hold while inpatient resume on discharge  Target blood sugar for the hospital is 140-180  Sugars well-controlled  Continue SSI  CHO diet  Hypoglycemia protocol  BMP a.m.  Diabetic neuropathy-continue gabapentin

## 2024-02-17 NOTE — PROGRESS NOTES
[facility]  Progress Note  Name: Jairon Oconnell I  MRN: [id number]  Unit/Bed#: -01 I Date of Admission: 2/4/2024   Date of Service: 2/17/2024 I Hospital Day: 13    Assessment/Plan   * Nondisplaced fracture of greater trochanter of right femur, initial encounter for closed fracture (HCC)  Assessment & Plan  Status post a mechanical fall prior to arrival   CT right lower extremity imaging confirmed a comminuted and nondisplaced fractures of the right greater trochanter with no evidence of femoral neck fracture.   Orthopedic surgery consultation appreciated- No indication at this time for an acute phase inpatient surgical intervention.   Weightbearing as tolerated  Continue current pain medication management regimen  PT/OT recommending postacute rehab  Status post a psychiatric evaluation-the patient has been cleared from a psych standpoint for rehab at a facility  Case management is working on placement  Medically stable, will discharge when arrangements finalized by case management    Behavior concern in adult  Assessment & Plan  With impulsive behavior at times  Continue Seroquel 200 mg at 8 AM and 2 PM and 400 mg at bedtime  Supportive care  Nursing staff reported patient with agitation  Psychiatry consultation appreciated- discontinued benztropine and decreased hydroxyzine to 10 mg twice daily as patient may be responding adversely to anticholinergic effects. Increase gabapentin to 400 mg po 3 times daily as mood stabilizer. Added prn Zyprexa 5 mg po every 6 hours prn    Acquired hypothyroidism  Assessment & Plan  Continue prehospital levothyroxine    Essential hypertension  Assessment & Plan  Blood pressure controlled on current regimen  Continue lisinopril  Monitor BP per protocol    Hyperlipidemia  Assessment & Plan  Continue pravastatin    Generalized convulsive epilepsy (HCC)  Assessment & Plan  Currently controlled  Continue Depakote 750 mg in the morning and at 1000 mg at  bedtime, Vimpat 150 mg in the morning and 200 mg at bedtime, Keppra 750 mg BID, and zonisamide 300 mg at bedtime      Type 2 diabetes mellitus with diabetic neuropathy, without long-term current use of insulin (HCC)  Assessment & Plan  Lab Results   Component Value Date    HGBA1C 5.5 11/27/2023       Recent Labs     02/16/24  1630 02/16/24  2117 02/17/24  0651 02/17/24  1111   POCGLU 169* 196* 169* 168*         Blood Sugar Average: Last 72 hrs:  (P) [id number]  Oral hypoglycemic medications hold while inpatient resume on discharge  Target blood sugar for the hospital is 140-180  Sugars well-controlled  Continue SSI  CHO diet  Hypoglycemia protocol  BMP a.m.  Diabetic neuropathy-continue gabapentin    Cerebral palsy (HCC)  Assessment & Plan  He lives in a group home and has caregiver  Continue supportive care           VTE Pharmacologic Prophylaxis: VTE Score: 6 High Risk (Score >/= 5) - Pharmacological DVT Prophylaxis Ordered: heparin. Sequential Compression Devices Ordered.    Mobility:   Basic Mobility Inpatient Raw Score: 13  -HLM Goal: 4: Move to chair/commode  JH-HLM Achieved: 2: Bed activities/Dependent transfer  HLM Goal NOT achieved. Continue with multidisciplinary rounding and encourage appropriate mobility to improve upon HLM goals.    Patient Centered Rounds: I performed bedside rounds with nursing staff today.   Discussions with Specialists or Other Care Team Provider: nursingGORDON      Total Time Spent on Date of Encounter in care of patient: 25 mins. This time was spent on one or more of the following: performing physical exam; counseling and coordination of care; obtaining or reviewing history; documenting in the medical record; reviewing/ordering tests, medications or procedures; communicating with other healthcare professionals and discussing with patient's family/caregivers.    Current Length of Stay: 13 day(s)  Current Patient Status: Inpatient   Certification Statement: The patient  will continue to require additional inpatient hospital stay due to need for placement to STR  Discharge Plan:  patient medically stable for discharge, awaiting placement to STR    Code Status: Level 1 - Full Code    Subjective:   The patient was seen and examined. The patient is lying in bed in no acute distress. He denies any complaints.     Objective:     Vitals:   Temp (24hrs), Av.8 °F (36.6 °C), Min:97.6 °F (36.4 °C), Max:98 °F (36.7 °C)    Temp:  [97.6 °F (36.4 °C)-98 °F (36.7 °C)] 98 °F (36.7 °C)  Resp:  [18] 18  BP: (112-131)/(73-77) 129/73  SpO2:  [92 %] 92 %  Body mass index is 21.14 kg/m².     Input and Output Summary (last 24 hours):     Intake/Output Summary (Last 24 hours) at 2024 1643  Last data filed at 2024 1100  Gross per 24 hour   Intake 1090 ml   Output 1475 ml   Net -385 ml       Physical Exam:   Physical Exam  Vitals and nursing note reviewed.   Constitutional:       General: He is awake.      Appearance: Normal appearance.   HENT:      Head: Normocephalic and atraumatic.   Cardiovascular:      Rate and Rhythm: Normal rate and regular rhythm.      Heart sounds: Normal heart sounds.   Pulmonary:      Effort: Pulmonary effort is normal.      Breath sounds: Normal breath sounds.   Abdominal:      Palpations: Abdomen is soft.      Tenderness: There is no abdominal tenderness.   Skin:     General: Skin is warm and dry.   Neurological:      General: No focal deficit present.      Mental Status: He is alert. Mental status is at baseline.   Psychiatric:         Attention and Perception: Attention normal.         Mood and Affect: Mood normal.         Speech: Speech normal.         Behavior: Behavior is cooperative.          Additional Data:     Labs:  Results from last 7 days   Lab Units 02/15/24  0602   WBC Thousand/uL 7.50   HEMOGLOBIN g/dL 11.6*   HEMATOCRIT % 35.8*   PLATELETS Thousands/uL 176   NEUTROS PCT % 27*   LYMPHS PCT % 55*   MONOS PCT % 11   EOS PCT % 5     Results from last 7  days   Lab Units 02/15/24  0602   SODIUM mmol/L 143   POTASSIUM mmol/L 4.4   CHLORIDE mmol/L 106   CO2 mmol/L 29   BUN mg/dL 27*   CREATININE mg/dL 0.78   ANION GAP mmol/L 8   CALCIUM mg/dL 9.2   GLUCOSE RANDOM mg/dL 116         Results from last 7 days   Lab Units 02/17/24  1111 02/17/24  0651 02/16/24  2117 02/16/24  1630 02/16/24  1053 02/16/24  0716 02/15/24  2053 02/15/24  1606 02/15/24  1054 02/15/24  0712 02/14/24  2109 02/14/24  1603   POC GLUCOSE mg/dl 168* 169* 196* 169* 149* 152* 184* 482* 117 121 195* 157*               Lines/Drains:  Invasive Devices       Peripheral Intravenous Line  Duration             Peripheral IV 02/13/24 Dorsal (posterior);Right Hand 4 days                          Imaging: No pertinent imaging reviewed.    Recent Cultures (last 7 days):         Last 24 Hours Medication List:   Current Facility-Administered Medications   Medication Dose Route Frequency Provider Last Rate    acetaminophen  650 mg Oral Q6H PRN Ashley Good, HUNTER      calcium carbonate  1 tablet Oral TID Ashley Good, CRNP      cholecalciferol  1,000 Units Oral Daily Ashley Good, CRNP      vitamin B-12  1,000 mcg Oral Every Other Day Ashley Good, NATHENNP      Diclofenac Sodium  2 g Topical 4x Daily Jose Angel Carpenter PA-C      divalproex sodium  1,000 mg Oral HS Ashley Good, NATHENNP      divalproex sodium  750 mg Oral Daily Ashley Good, NATHENNP      docusate sodium  100 mg Oral BID Ashley Good, NATHENNP      gabapentin  400 mg Oral TID Kolby Galeas PA-C      heparin (porcine)  5,000 Units Subcutaneous Q8H RONALD Ashley Good, HUNTER      hydrOXYzine HCL  10 mg Oral BID Kolby Galeas PA-C      insulin lispro  1-5 Units Subcutaneous TID AC Ashley Good, HUNTER      insulin lispro  1-5 Units Subcutaneous HS Ashley Good, NATHENNP      lacosamide  150 mg Oral Daily Ashley Good, CRNP      lacosamide  200 mg Oral HS Ashley Good, CRNP      levETIRAcetam  1,500 mg Oral Q12H RONALD Ashley Good,  CRNP      levothyroxine  150 mcg Oral Early Morning Ashley Good, CRNP      lisinopril  10 mg Oral Daily Ashley Good, CRNP      loratadine  10 mg Oral Daily Ashley Good, CRNP      multivitamin stress formula  1 tablet Oral Daily Ashley Laureanoust, CRNP      OLANZapine  5 mg Oral Q6H PRN Kolby Galeas PA-C      pravastatin  80 mg Oral Daily With Dinner Ashley Good, CRNP      QUEtiapine  200 mg Oral BID Ashley Good, CRNP      QUEtiapine  400 mg Oral HS Ashley Good, CRNP      temazepam  15 mg Oral HS PRN Ashley Good, CRNP      zonisamide  300 mg Oral HS Ashley Good, CRNP          Today, Patient Was Seen By: Kolby Galeas PA-C    **Please Note: This note may have been constructed using a voice recognition system.**

## 2024-02-17 NOTE — CASE MANAGEMENT
Case Management Discharge Planning Note    Patient name Jairon Oconnell  Location /-01 MRN [id number]  : 1965 Date 2024       Current Admission Date: 2024  Current Admission Diagnosis:Nondisplaced fracture of greater trochanter of right femur, initial encounter for closed fracture ([facility])   Patient Active Problem List    Diagnosis Date Noted    Nondisplaced fracture of greater trochanter of right femur, initial encounter for closed fracture ([facility]) 2024    Closed nondisplaced fracture of proximal phalanx of left index finger with routine healing, subsequent encounter 10/30/2023    Pressure ulcer of sacral region, stage 3 ([facility]) 2023    Ambulatory dysfunction 2022    Social problem 2021    Hypomagnesemia 2021    Thrombocytopathia ([facility]) 2020    Hyponatremia 2019    Metabolic encephalopathy 2019    Seborrheic dermatitis of scalp 2019    Nearsightedness 2019    Behavior concern in adult 2019    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    Vitamin B12 deficiency 2017    Hyperlipidemia 2016    Vitamin D deficiency 2016    Type 2 diabetes mellitus with diabetic neuropathy, without long-term current use of insulin ([facility]) 06/15/2016    Acquired hypothyroidism 06/15/2016    Cataract 2013    Cerebral palsy ([facility]) 2013    Generalized convulsive epilepsy ([facility]) 2013    Essential hypertension 2013    Osteoporosis 2013    Scoliosis 2013      LOS (days): 12  Geometric Mean LOS (GMLOS) (days): 2.8  Days to GMLOS:-9.5     OBJECTIVE:  Risk of Unplanned Readmission Score: 21.59         Current admission status: Inpatient   Preferred Pharmacy:   [facility] [place] - [address]  [address] [address]  Phone: [phone] Fax: [phone]    Primary Care Provider: HUNTER Brown    Primary Insurance: MEDICARE  Secondary Insurance:  [place] [employer]    DISCHARGE DETAILS:    Discharge planning discussed with:: patient and Madeline his  came to see him today - Rossy from [facility] came to see the pt today  Freedom of Choice: Yes  Comments - Freedom of Choice: recommendation is for rehab- [facility] accepted - pt has received 1 of the 3 letters determination letters  CM contacted family/caregiver?: Yes             Contacts  Patient Contacts: Madeline Arvizu case manger  # [phone]  Relationship to Patient:: Other (Comment) (cm)  Phone Number: [phone]  Reason/Outcome: Discharge Planning    Requested Home Health Care         Is the patient interested in HHC at discharge?: No    DME Referral Provided  Referral made for DME?: No    Other Referral/Resources/Interventions Provided:  Interventions: Short Term Rehab  Referral Comments: pt accepted to [facility]- determinaton letters needed- pt needs to be off 1:1- psych consult ordered today- pt's cm will help wiht placement if needed    Would you like to participate in our [facility] service program?  : No - Declined    Treatment Team Recommendation:  ([facility] need determination letters- bls)

## 2024-02-17 NOTE — ASSESSMENT & PLAN NOTE
With impulsive behavior at times  Continue Seroquel 200 mg at 8 AM and 2 PM and 400 mg at bedtime  Supportive care  Nursing staff reported patient with agitation  Psychiatry consultation appreciated- discontinued benztropine and decreased hydroxyzine to 10 mg twice daily as patient may be responding adversely to anticholinergic effects. Increase gabapentin to 400 mg po 3 times daily as mood stabilizer. Added prn Zyprexa 5 mg po every 6 hours prn

## 2024-02-17 NOTE — PLAN OF CARE
Problem: Potential for Falls  Goal: Patient will remain free of falls  Description: INTERVENTIONS:  - Educate patient/family on patient safety including physical limitations  - Instruct patient to call for assistance with activity   - Consult OT/PT to assist with strengthening/mobility   - Keep Call bell within reach  - Keep bed low and locked with side rails adjusted as appropriate  - Keep care items and personal belongings within reach  - Initiate and maintain comfort rounds  - Make Fall Risk Sign visible to staff  - Offer Toileting every 2 Hours, in advance of need  - Initiate/Maintain alarms  - Obtain necessary fall risk management equipment:   - Apply yellow socks and bracelet for high fall risk patients  - Consider moving patient to room near nurses station  Outcome: Progressing     Problem: Prexisting or High Potential for Compromised Skin Integrity  Goal: Skin integrity is maintained or improved  Description: INTERVENTIONS:  - Identify patients at risk for skin breakdown  - Assess and monitor skin integrity  - Assess and monitor nutrition and hydration status  - Monitor labs   - Assess for incontinence   - Turn and reposition patient  - Assist with mobility/ambulation  - Relieve pressure over bony prominences  - Avoid friction and shearing  - Provide appropriate hygiene as needed including keeping skin clean and dry  - Evaluate need for skin moisturizer/barrier cream  - Collaborate with interdisciplinary team   - Patient/family teaching  - Consider wound care consult   Outcome: Progressing     Problem: PAIN - ADULT  Goal: Verbalizes/displays adequate comfort level or baseline comfort level  Description: Interventions:  - Encourage patient to monitor pain and request assistance  - Assess pain using appropriate pain scale  - Administer analgesics based on type and severity of pain and evaluate response  - Implement non-pharmacological measures as appropriate and evaluate response  - Consider cultural and  social influences on pain and pain management  - Notify physician/advanced practitioner if interventions unsuccessful or patient reports new pain  Outcome: Progressing     Problem: INFECTION - ADULT  Goal: Absence or prevention of progression during hospitalization  Description: INTERVENTIONS:  - Assess and monitor for signs and symptoms of infection  - Monitor lab/diagnostic results  - Monitor all insertion sites, i.e. indwelling lines, tubes, and drains  - Monitor endotracheal if appropriate and nasal secretions for changes in amount and color  - [place] appropriate cooling/warming therapies per order  - Administer medications as ordered  - Instruct and encourage patient and family to use good hand hygiene technique  - Identify and instruct in appropriate isolation precautions for identified infection/condition  Outcome: Progressing     Problem: SAFETY ADULT  Goal: Maintain or return to baseline ADL function  Description: INTERVENTIONS:  -  Assess patient's ability to carry out ADLs; assess patient's baseline for ADL function and identify physical deficits which impact ability to perform ADLs (bathing, care of mouth/teeth, toileting, grooming, dressing, etc.)  - Assess/evaluate cause of self-care deficits   - Assess range of motion  - Assess patient's mobility; develop plan if impaired  - Assess patient's need for assistive devices and provide as appropriate  - Encourage maximum independence but intervene and supervise when necessary  - Involve family in performance of ADLs  - Assess for home care needs following discharge   - Consider OT consult to assist with ADL evaluation and planning for discharge  - Provide patient education as appropriate  Outcome: Progressing  Goal: Maintains/Returns to pre admission functional level  Description: INTERVENTIONS:  - Perform AM-PAC 6 Click Basic Mobility/ Daily Activity assessment daily.  - Set and communicate daily mobility goal to care team and patient/family/caregiver.    - Collaborate with rehabilitation services on mobility goals if consulted  - Reposition patient every 2 hours.  - Out of bed for toileting/meals  - Record patient progress and toleration of activity level   Outcome: Progressing     Problem: DISCHARGE PLANNING  Goal: Discharge to home or other facility with appropriate resources  Description: INTERVENTIONS:  - Identify barriers to discharge w/patient and caregiver  - Arrange for needed discharge resources and transportation as appropriate  - Identify discharge learning needs (meds, wound care, etc.)  - Refer to Case Management Department for coordinating discharge planning if the patient needs post-hospital services based on physician/advanced practitioner order or complex needs related to functional status, cognitive ability, or social support system  Outcome: Progressing     Problem: Knowledge Deficit  Goal: Patient/family/caregiver demonstrates understanding of disease process, treatment plan, medications, and discharge instructions  Description: Complete learning assessment and assess knowledge base.  Interventions:  - Provide teaching at level of understanding  - Provide teaching via preferred learning methods  Outcome: Progressing     Problem: METABOLIC, FLUID AND ELECTROLYTES - ADULT  Goal: Glucose maintained within target range  Description: INTERVENTIONS:  - Monitor Blood Glucose as ordered  - Assess for signs and symptoms of hyperglycemia and hypoglycemia  - Administer ordered medications to maintain glucose within target range  - Assess nutritional intake and initiate nutrition service referral as needed  Outcome: Progressing     Problem: SKIN/TISSUE INTEGRITY - ADULT  Goal: Skin Integrity remains intact(Skin Breakdown Prevention)  Description: Assess:  -Perform Nolan assessment every shift  -Clean and moisturize skin every shift  -Inspect skin when repositioning, toileting, and assisting with ADLS  -Assess under medical devices such as jacy every  shift  -Assess extremities for adequate circulation and sensation     Bed Management:  -Have minimal linens on bed & keep smooth, unwrinkled  -Change linens as needed when moist or perspiring  -Avoid sitting or lying in one position for more than 2 hours while in bed  -Keep HOB at 20 degrees     Toileting:  -Offer bedside commode  -Assess for incontinence every 2 hours  -Use incontinent care products after each incontinent episode such as hydraguard    Activity:  -Mobilize patient 3 times a day  -Encourage activity and walks on unit  -Encourage or provide ROM exercises   -Turn and reposition patient every 2 Hours  -Use appropriate equipment to lift or move patient in bed  -Instruct/ Assist with weight shifting every 30 min when out of bed in chair  -Consider limitation of chair time 2 hour intervals    Skin Care:  -Avoid use of baby powder, tape, friction and shearing, hot water or constrictive clothing  -Relieve pressure over bony prominences using hydraguard  -Do not massage red bony areas    Next Steps:  -Teach patient strategies to minimize risks such as frequent repositioning   -Consider consults to  interdisciplinary teams such as wound care  Outcome: Progressing  Goal: Pressure injury heals and does not worsen  Description: Interventions:  - Implement low air loss mattress or specialty surface (Criteria met)  - Apply silicone foam dressing  - Instruct/assist with weight shifting every 30 min minutes when in chair   - Limit chair time to 2 hour intervals  - Use special pressure reducing interventions such as cushion when in chair   - Apply fecal or urinary incontinence containment device   - Perform passive or active ROM every shift  - Turn and reposition patient & offload bony prominences every 2 hours   - Utilize friction reducing device or surface for transfers   - Consider consults to  interdisciplinary teams such as wound care  - Use incontinent care products after each incontinent episode such as  hydraguard  - Consider nutrition services referral as needed  Outcome: Progressing     Problem: MUSCULOSKELETAL - ADULT  Goal: Maintain or return mobility to safest level of function  Description: INTERVENTIONS:  - Assess patient's ability to carry out ADLs; assess patient's baseline for ADL function and identify physical deficits which impact ability to perform ADLs (bathing, care of mouth/teeth, toileting, grooming, dressing, etc.)  - Assess/evaluate cause of self-care deficits   - Assess range of motion  - Assess patient's mobility  - Assess patient's need for assistive devices and provide as appropriate  - Encourage maximum independence but intervene and supervise when necessary  - Involve family in performance of ADLs  - Assess for home care needs following discharge   - Consider OT consult to assist with ADL evaluation and planning for discharge  - Provide patient education as appropriate  Outcome: Progressing

## 2024-02-18 LAB
GLUCOSE SERPL-MCNC: 140 MG/DL (ref 65–140)
GLUCOSE SERPL-MCNC: 142 MG/DL (ref 65–140)
GLUCOSE SERPL-MCNC: 148 MG/DL (ref 65–140)
GLUCOSE SERPL-MCNC: 151 MG/DL (ref 65–140)

## 2024-02-18 PROCEDURE — 99232 SBSQ HOSP IP/OBS MODERATE 35: CPT | Performed by: PHYSICIAN ASSISTANT

## 2024-02-18 PROCEDURE — 82948 REAGENT STRIP/BLOOD GLUCOSE: CPT

## 2024-02-18 RX ORDER — LACTULOSE 10 G/15ML
20 SOLUTION ORAL ONCE
Status: COMPLETED | OUTPATIENT
Start: 2024-02-18 | End: 2024-02-18

## 2024-02-18 RX ORDER — ENEMA 19; 7 G/133ML; G/133ML
1 ENEMA RECTAL ONCE
Status: COMPLETED | OUTPATIENT
Start: 2024-02-18 | End: 2024-02-18

## 2024-02-18 RX ADMIN — DICLOFENAC SODIUM 2 G: 10 GEL TOPICAL at 11:15

## 2024-02-18 RX ADMIN — OLANZAPINE 5 MG: 5 TABLET, FILM COATED ORAL at 22:09

## 2024-02-18 RX ADMIN — LACTULOSE 20 G: 20 SOLUTION ORAL at 11:14

## 2024-02-18 RX ADMIN — HYDROXYZINE HYDROCHLORIDE 10 MG: 10 TABLET ORAL at 14:59

## 2024-02-18 RX ADMIN — HEPARIN SODIUM 5000 UNITS: 5000 INJECTION INTRAVENOUS; SUBCUTANEOUS at 21:21

## 2024-02-18 RX ADMIN — CALCIUM 1 TABLET: 500 TABLET ORAL at 16:49

## 2024-02-18 RX ADMIN — GABAPENTIN 400 MG: 400 CAPSULE ORAL at 21:21

## 2024-02-18 RX ADMIN — B-COMPLEX W/ C & FOLIC ACID TAB 1 TABLET: TAB at 08:09

## 2024-02-18 RX ADMIN — LEVETIRACETAM 1500 MG: 500 TABLET, FILM COATED ORAL at 08:08

## 2024-02-18 RX ADMIN — QUETIAPINE 200 MG: 400 TABLET, FILM COATED, EXTENDED RELEASE ORAL at 08:08

## 2024-02-18 RX ADMIN — PRAVASTATIN SODIUM 80 MG: 40 TABLET ORAL at 16:49

## 2024-02-18 RX ADMIN — DICLOFENAC SODIUM 2 G: 10 GEL TOPICAL at 22:41

## 2024-02-18 RX ADMIN — DOCUSATE SODIUM 100 MG: 100 CAPSULE, LIQUID FILLED ORAL at 18:22

## 2024-02-18 RX ADMIN — CALCIUM 1 TABLET: 500 TABLET ORAL at 21:20

## 2024-02-18 RX ADMIN — DOCUSATE SODIUM 100 MG: 100 CAPSULE, LIQUID FILLED ORAL at 08:09

## 2024-02-18 RX ADMIN — HEPARIN SODIUM 5000 UNITS: 5000 INJECTION INTRAVENOUS; SUBCUTANEOUS at 06:44

## 2024-02-18 RX ADMIN — INSULIN LISPRO 1 UNITS: 100 INJECTION, SOLUTION INTRAVENOUS; SUBCUTANEOUS at 17:00

## 2024-02-18 RX ADMIN — LACOSAMIDE 200 MG: 150 TABLET ORAL at 21:20

## 2024-02-18 RX ADMIN — ZONISAMIDE 300 MG: 100 CAPSULE ORAL at 21:19

## 2024-02-18 RX ADMIN — QUETIAPINE 200 MG: 400 TABLET, FILM COATED, EXTENDED RELEASE ORAL at 14:59

## 2024-02-18 RX ADMIN — DIVALPROEX SODIUM 750 MG: 250 TABLET, DELAYED RELEASE ORAL at 08:08

## 2024-02-18 RX ADMIN — LEVOTHYROXINE SODIUM 150 MCG: 75 TABLET ORAL at 06:44

## 2024-02-18 RX ADMIN — GABAPENTIN 400 MG: 400 CAPSULE ORAL at 16:49

## 2024-02-18 RX ADMIN — LEVETIRACETAM 1500 MG: 500 TABLET, FILM COATED ORAL at 21:20

## 2024-02-18 RX ADMIN — CHOLECALCIFEROL TAB 25 MCG (1000 UNIT) 1000 UNITS: 25 TAB at 08:09

## 2024-02-18 RX ADMIN — LACOSAMIDE 150 MG: 150 TABLET ORAL at 08:08

## 2024-02-18 RX ADMIN — DICLOFENAC SODIUM 2 G: 10 GEL TOPICAL at 08:10

## 2024-02-18 RX ADMIN — DIVALPROEX SODIUM 1000 MG: 500 TABLET, DELAYED RELEASE ORAL at 21:19

## 2024-02-18 RX ADMIN — HYDROXYZINE HYDROCHLORIDE 10 MG: 10 TABLET ORAL at 08:08

## 2024-02-18 RX ADMIN — GABAPENTIN 400 MG: 400 CAPSULE ORAL at 08:08

## 2024-02-18 RX ADMIN — ACETAMINOPHEN 650 MG: 325 TABLET ORAL at 22:37

## 2024-02-18 RX ADMIN — LISINOPRIL 10 MG: 10 TABLET ORAL at 08:09

## 2024-02-18 RX ADMIN — LORATADINE 10 MG: 10 TABLET ORAL at 08:09

## 2024-02-18 RX ADMIN — HEPARIN SODIUM 5000 UNITS: 5000 INJECTION INTRAVENOUS; SUBCUTANEOUS at 14:59

## 2024-02-18 RX ADMIN — CALCIUM 1 TABLET: 500 TABLET ORAL at 08:09

## 2024-02-18 RX ADMIN — DICLOFENAC SODIUM 2 G: 10 GEL TOPICAL at 18:22

## 2024-02-18 RX ADMIN — QUETIAPINE 400 MG: 400 TABLET, FILM COATED, EXTENDED RELEASE ORAL at 21:20

## 2024-02-18 RX ADMIN — SODIUM PHOSPHATE, DIBASIC AND SODIUM PHOSPHATE, MONOBASIC 1 ENEMA: 7; 19 ENEMA RECTAL at 22:37

## 2024-02-18 NOTE — PLAN OF CARE
Problem: Potential for Falls  Goal: Patient will remain free of falls  Description: INTERVENTIONS:  - Educate patient/family on patient safety including physical limitations  - Instruct patient to call for assistance with activity   - Consult OT/PT to assist with strengthening/mobility   - Keep Call bell within reach  - Keep bed low and locked with side rails adjusted as appropriate  - Keep care items and personal belongings within reach  - Initiate and maintain comfort rounds  - Make Fall Risk Sign visible to staff  - Offer Toileting every 2 Hours, in advance of need  - Initiate/Maintain alarms  - Obtain necessary fall risk management equipment:   - Apply yellow socks and bracelet for high fall risk patients  - Consider moving patient to room near nurses station  Outcome: Progressing     Problem: Prexisting or High Potential for Compromised Skin Integrity  Goal: Skin integrity is maintained or improved  Description: INTERVENTIONS:  - Identify patients at risk for skin breakdown  - Assess and monitor skin integrity  - Assess and monitor nutrition and hydration status  - Monitor labs   - Assess for incontinence   - Turn and reposition patient  - Assist with mobility/ambulation  - Relieve pressure over bony prominences  - Avoid friction and shearing  - Provide appropriate hygiene as needed including keeping skin clean and dry  - Evaluate need for skin moisturizer/barrier cream  - Collaborate with interdisciplinary team   - Patient/family teaching  - Consider wound care consult   Outcome: Progressing     Problem: PAIN - ADULT  Goal: Verbalizes/displays adequate comfort level or baseline comfort level  Description: Interventions:  - Encourage patient to monitor pain and request assistance  - Assess pain using appropriate pain scale  - Administer analgesics based on type and severity of pain and evaluate response  - Implement non-pharmacological measures as appropriate and evaluate response  - Consider cultural and  social influences on pain and pain management  - Notify physician/advanced practitioner if interventions unsuccessful or patient reports new pain  Outcome: Progressing     Problem: INFECTION - ADULT  Goal: Absence or prevention of progression during hospitalization  Description: INTERVENTIONS:  - Assess and monitor for signs and symptoms of infection  - Monitor lab/diagnostic results  - Monitor all insertion sites, i.e. indwelling lines, tubes, and drains  - Monitor endotracheal if appropriate and nasal secretions for changes in amount and color  - [place] appropriate cooling/warming therapies per order  - Administer medications as ordered  - Instruct and encourage patient and family to use good hand hygiene technique  - Identify and instruct in appropriate isolation precautions for identified infection/condition  Outcome: Progressing     Problem: SAFETY ADULT  Goal: Maintain or return to baseline ADL function  Description: INTERVENTIONS:  -  Assess patient's ability to carry out ADLs; assess patient's baseline for ADL function and identify physical deficits which impact ability to perform ADLs (bathing, care of mouth/teeth, toileting, grooming, dressing, etc.)  - Assess/evaluate cause of self-care deficits   - Assess range of motion  - Assess patient's mobility; develop plan if impaired  - Assess patient's need for assistive devices and provide as appropriate  - Encourage maximum independence but intervene and supervise when necessary  - Involve family in performance of ADLs  - Assess for home care needs following discharge   - Consider OT consult to assist with ADL evaluation and planning for discharge  - Provide patient education as appropriate  Outcome: Progressing  Goal: Maintains/Returns to pre admission functional level  Description: INTERVENTIONS:  - Perform AM-PAC 6 Click Basic Mobility/ Daily Activity assessment daily.  - Set and communicate daily mobility goal to care team and patient/family/caregiver.    - Collaborate with rehabilitation services on mobility goals if consulted  - Reposition patient every 2 hours.  - Out of bed for toileting/meals  - Record patient progress and toleration of activity level   Outcome: Progressing     Problem: DISCHARGE PLANNING  Goal: Discharge to home or other facility with appropriate resources  Description: INTERVENTIONS:  - Identify barriers to discharge w/patient and caregiver  - Arrange for needed discharge resources and transportation as appropriate  - Identify discharge learning needs (meds, wound care, etc.)  - Refer to Case Management Department for coordinating discharge planning if the patient needs post-hospital services based on physician/advanced practitioner order or complex needs related to functional status, cognitive ability, or social support system  Outcome: Progressing     Problem: Knowledge Deficit  Goal: Patient/family/caregiver demonstrates understanding of disease process, treatment plan, medications, and discharge instructions  Description: Complete learning assessment and assess knowledge base.  Interventions:  - Provide teaching at level of understanding  - Provide teaching via preferred learning methods  Outcome: Progressing     Problem: METABOLIC, FLUID AND ELECTROLYTES - ADULT  Goal: Glucose maintained within target range  Description: INTERVENTIONS:  - Monitor Blood Glucose as ordered  - Assess for signs and symptoms of hyperglycemia and hypoglycemia  - Administer ordered medications to maintain glucose within target range  - Assess nutritional intake and initiate nutrition service referral as needed  Outcome: Progressing     Problem: SKIN/TISSUE INTEGRITY - ADULT  Goal: Skin Integrity remains intact(Skin Breakdown Prevention)  Description: Assess:  -Perform Nolan assessment every shift  -Clean and moisturize skin every shift  -Inspect skin when repositioning, toileting, and assisting with ADLS  -Assess under medical devices such as jacy every  shift  -Assess extremities for adequate circulation and sensation     Bed Management:  -Have minimal linens on bed & keep smooth, unwrinkled  -Change linens as needed when moist or perspiring  -Avoid sitting or lying in one position for more than 2 hours while in bed  -Keep HOB at 20 degrees     Toileting:  -Offer bedside commode  -Assess for incontinence every 2 hours  -Use incontinent care products after each incontinent episode such as hydraguard    Activity:  -Mobilize patient 3 times a day  -Encourage activity and walks on unit  -Encourage or provide ROM exercises   -Turn and reposition patient every 2 Hours  -Use appropriate equipment to lift or move patient in bed  -Instruct/ Assist with weight shifting every 30 min when out of bed in chair  -Consider limitation of chair time 2 hour intervals    Skin Care:  -Avoid use of baby powder, tape, friction and shearing, hot water or constrictive clothing  -Relieve pressure over bony prominences using hydraguard  -Do not massage red bony areas    Next Steps:  -Teach patient strategies to minimize risks such as frequent repositioning   -Consider consults to  interdisciplinary teams such as wound care  Outcome: Progressing  Goal: Pressure injury heals and does not worsen  Description: Interventions:  - Implement low air loss mattress or specialty surface (Criteria met)  - Apply silicone foam dressing  - Instruct/assist with weight shifting every 30 min minutes when in chair   - Limit chair time to 2 hour intervals  - Use special pressure reducing interventions such as cushion when in chair   - Apply fecal or urinary incontinence containment device   - Perform passive or active ROM every shift  - Turn and reposition patient & offload bony prominences every 2 hours   - Utilize friction reducing device or surface for transfers   - Consider consults to  interdisciplinary teams such as wound care  - Use incontinent care products after each incontinent episode such as  hydraguard  - Consider nutrition services referral as needed  Outcome: Progressing

## 2024-02-18 NOTE — PROGRESS NOTES
[facility]  Progress Note  Name: Jairon Oconnell I  MRN: [id number]  Unit/Bed#: -01 I Date of Admission: 2/4/2024   Date of Service: 2/18/2024 I Hospital Day: 14    Assessment/Plan   * Nondisplaced fracture of greater trochanter of right femur, initial encounter for closed fracture (HCC)  Assessment & Plan  Status post a mechanical fall prior to arrival   CT right lower extremity imaging confirmed a comminuted and nondisplaced fractures of the right greater trochanter with no evidence of femoral neck fracture.   Orthopedic surgery consultation appreciated- No indication at this time for an acute phase inpatient surgical intervention.   Weightbearing as tolerated  Continue current pain medication management regimen  PT/OT recommending postacute rehab  Status post a psychiatric evaluation-the patient has been cleared from a psych standpoint for rehab at a facility  Case management is working on placement  Medically stable, will discharge when arrangements finalized by case management    Behavior concern in adult  Assessment & Plan  With impulsive behavior at times  Continue Seroquel 200 mg at 8 AM and 2 PM and 400 mg at bedtime  Supportive care  Nursing staff reported patient with agitation on 2/16- now improved  Psychiatry consultation appreciated- discontinued benztropine and decreased hydroxyzine to 10 mg twice daily as patient may be responding adversely to anticholinergic effects. Increased gabapentin to 400 mg po 3 times daily as mood stabilizer. Added prn Zyprexa 5 mg po every 6 hours prn    Acquired hypothyroidism  Assessment & Plan  Continue prehospital levothyroxine    Essential hypertension  Assessment & Plan  Blood pressure controlled on current regimen  Continue lisinopril  Monitor BP per protocol    Hyperlipidemia  Assessment & Plan  Continue pravastatin    Generalized convulsive epilepsy (HCC)  Assessment & Plan  Currently controlled  Continue Depakote 750 mg in the morning  and at 1000 mg at bedtime, Vimpat 150 mg in the morning and 200 mg at bedtime, Keppra 750 mg BID, and zonisamide 300 mg at bedtime      Type 2 diabetes mellitus with diabetic neuropathy, without long-term current use of insulin (HCC)  Assessment & Plan  Lab Results   Component Value Date    HGBA1C 5.5 11/27/2023       Recent Labs     02/17/24  2144 02/18/24  0711 02/18/24  1113 02/18/24  1555   POCGLU 155* 148* 140 151*         Blood Sugar Average: Last 72 hrs:  (P) 175.0329704575222547  Oral hypoglycemic medications hold while inpatient resume on discharge  Target blood sugar for the hospital is 140-180  Sugars well-controlled  Continue SSI  CHO diet  Hypoglycemia protocol  BMP a.m.  Diabetic neuropathy-continue gabapentin    Cerebral palsy (HCC)  Assessment & Plan  He lives in a group home and has caregiver  Continue supportive care           VTE Pharmacologic Prophylaxis: VTE Score: 6 High Risk (Score >/= 5) - Pharmacological DVT Prophylaxis Ordered: heparin. Sequential Compression Devices Ordered.    Mobility:   Basic Mobility Inpatient Raw Score: 13  JH-HLM Goal: 4: Move to chair/commode  JH-HLM Achieved: 4: Move to chair/commode  HLM Goal achieved. Continue to encourage appropriate mobility.    Patient Centered Rounds: I performed bedside rounds with nursing staff today.   Discussions with Specialists or Other Care Team Provider: nursing    Education and Discussions with Family / Patient:  sister aware of waiting for placement.     Total Time Spent on Date of Encounter in care of patient: 25 mins. This time was spent on one or more of the following: performing physical exam; counseling and coordination of care; obtaining or reviewing history; documenting in the medical record; reviewing/ordering tests, medications or procedures; communicating with other healthcare professionals and discussing with patient's family/caregivers.    Current Length of Stay: 14 day(s)  Current Patient Status: Inpatient    Certification Statement: The patient will continue to require additional inpatient hospital stay due to need for placement to [facility]  Discharge Plan:  The patient is medically stable for discharge- awaiting placement    Code Status: Level 1 - Full Code    Subjective:   The patient was seen and examined. The patient is sitting up in his chair in no acute distress. He's watching basketball, no agitation today. He denies any complaints.     Objective:     Vitals:   Temp (24hrs), Av °F (36.7 °C), Min:98 °F (36.7 °C), Max:98 °F (36.7 °C)    Temp:  [98 °F (36.7 °C)] 98 °F (36.7 °C)  Resp:  [18] 18  BP: (115-140)/(72-74) 139/74  SpO2:  [92 %] 92 %  Body mass index is 21.14 kg/m².     Input and Output Summary (last 24 hours):     Intake/Output Summary (Last 24 hours) at 2024 1600  Last data filed at 2024 0900  Gross per 24 hour   Intake 240 ml   Output --   Net 240 ml       Physical Exam:   Physical Exam  Vitals and nursing note reviewed.   Constitutional:       General: He is awake.      Appearance: Normal appearance.   HENT:      Head: Normocephalic and atraumatic.   Cardiovascular:      Rate and Rhythm: Normal rate and regular rhythm.   Pulmonary:      Effort: Pulmonary effort is normal.      Breath sounds: Normal breath sounds.   Abdominal:      Palpations: Abdomen is soft.      Tenderness: There is no abdominal tenderness.   Skin:     General: Skin is warm and dry.   Neurological:      General: No focal deficit present.      Mental Status: He is alert. Mental status is at baseline.   Psychiatric:         Attention and Perception: Attention normal.         Mood and Affect: Mood normal.         Behavior: Behavior is cooperative.      Comments: Patient's speech difficulty to understand at times- this is his baseline          Additional Data:     Labs:  Results from last 7 days   Lab Units 02/15/24  0602   WBC Thousand/uL 7.50   HEMOGLOBIN g/dL 11.6*   HEMATOCRIT % 35.8*   PLATELETS Thousands/uL 176   NEUTROS  PCT % 27*   LYMPHS PCT % 55*   MONOS PCT % 11   EOS PCT % 5     Results from last 7 days   Lab Units 02/15/24  0602   SODIUM mmol/L 143   POTASSIUM mmol/L 4.4   CHLORIDE mmol/L 106   CO2 mmol/L 29   BUN mg/dL 27*   CREATININE mg/dL 0.78   ANION GAP mmol/L 8   CALCIUM mg/dL 9.2   GLUCOSE RANDOM mg/dL 116         Results from last 7 days   Lab Units 02/18/24  1555 02/18/24  1113 02/18/24  0711 02/17/24  2144 02/17/24  1645 02/17/24  1111 02/17/24  0651 02/16/24  2117 02/16/24  1630 02/16/24  1053 02/16/24  0716 02/15/24  2053   POC GLUCOSE mg/dl 151* 140 148* 155* 137 168* 169* 196* 169* 149* 152* 184*               Lines/Drains:  Invasive Devices       None                         Imaging: No pertinent imaging reviewed.    Recent Cultures (last 7 days):         Last 24 Hours Medication List:   Current Facility-Administered Medications   Medication Dose Route Frequency Provider Last Rate    acetaminophen  650 mg Oral Q6H PRN Ashley Good, HUNTER      calcium carbonate  1 tablet Oral TID Ashley Good, NATHENNP      cholecalciferol  1,000 Units Oral Daily Ashley Good, CRNP      vitamin B-12  1,000 mcg Oral Every Other Day Ashley Good, CRNP      Diclofenac Sodium  2 g Topical 4x Daily Jose Angel Carpenter PA-C      divalproex sodium  1,000 mg Oral HS Ashley Good, NATHENNP      divalproex sodium  750 mg Oral Daily Ashley Good, NATHENNP      docusate sodium  100 mg Oral BID Ashley Good, NATHENNP      gabapentin  400 mg Oral TID Kolby Galeas PA-C      heparin (porcine)  5,000 Units Subcutaneous Q8H RONALD Ashley Good, HUNTER      hydrOXYzine HCL  10 mg Oral BID Kolby Galeas PA-C      insulin lispro  1-5 Units Subcutaneous TID AC Ashley Good, HUNTER      insulin lispro  1-5 Units Subcutaneous HS Ashley Good, HUNTER      lacosamide  150 mg Oral Daily Ashley Good, NATHENNP      lacosamide  200 mg Oral HS HUNTER [place]      levETIRAcetam  1,500 mg Oral Q12H RONALD HUNTER Main      levothyroxine   150 mcg Oral Early Morning Ashley Good, CRNP      lisinopril  10 mg Oral Daily Ashley Good, CRNP      loratadine  10 mg Oral Daily Ashley Good, CRNP      multivitamin stress formula  1 tablet Oral Daily Ashley Good, CRNP      OLANZapine  5 mg Oral Q6H PRN Kolby Galeas PA-C      pravastatin  80 mg Oral Daily With Dinner Ashley Good, CRNP      QUEtiapine  200 mg Oral BID Ashley Good, CRNP      QUEtiapine  400 mg Oral HS Ashley Good, CRNP      temazepam  15 mg Oral HS PRN Ashley Good, CRNP      zonisamide  300 mg Oral HS Ashley Good, HUNTER          Today, Patient Was Seen By: Kolby Galeas PA-C    **Please Note: This note may have been constructed using a voice recognition system.**

## 2024-02-18 NOTE — PLAN OF CARE
Problem: MUSCULOSKELETAL - ADULT  Goal: Maintain or return mobility to safest level of function  Description: INTERVENTIONS:  - Assess patient's ability to carry out ADLs; assess patient's baseline for ADL function and identify physical deficits which impact ability to perform ADLs (bathing, care of mouth/teeth, toileting, grooming, dressing, etc.)  - Assess/evaluate cause of self-care deficits   - Assess range of motion  - Assess patient's mobility  - Assess patient's need for assistive devices and provide as appropriate  - Encourage maximum independence but intervene and supervise when necessary  - Involve family in performance of ADLs  - Assess for home care needs following discharge   - Consider OT consult to assist with ADL evaluation and planning for discharge  - Provide patient education as appropriate  Outcome: Progressing     Problem: PAIN - ADULT  Goal: Verbalizes/displays adequate comfort level or baseline comfort level  Description: Interventions:  - Encourage patient to monitor pain and request assistance  - Assess pain using appropriate pain scale  - Administer analgesics based on type and severity of pain and evaluate response  - Implement non-pharmacological measures as appropriate and evaluate response  - Consider cultural and social influences on pain and pain management  - Notify physician/advanced practitioner if interventions unsuccessful or patient reports new pain  Outcome: Progressing

## 2024-02-18 NOTE — ASSESSMENT & PLAN NOTE
With impulsive behavior at times  Continue Seroquel 200 mg at 8 AM and 2 PM and 400 mg at bedtime  Supportive care  Nursing staff reported patient with agitation on 2/16- now improved  Psychiatry consultation appreciated- discontinued benztropine and decreased hydroxyzine to 10 mg twice daily as patient may be responding adversely to anticholinergic effects. Increased gabapentin to 400 mg po 3 times daily as mood stabilizer. Added prn Zyprexa 5 mg po every 6 hours prn

## 2024-02-18 NOTE — ASSESSMENT & PLAN NOTE
Lab Results   Component Value Date    HGBA1C 5.5 11/27/2023       Recent Labs     02/17/24  2144 02/18/24  0711 02/18/24  1113 02/18/24  1555   POCGLU 155* 148* 140 151*         Blood Sugar Average: Last 72 hrs:  (P) 175.8862169141830241  Oral hypoglycemic medications hold while inpatient resume on discharge  Target blood sugar for the hospital is 140-180  Sugars well-controlled  Continue SSI  CHO diet  Hypoglycemia protocol  BMP a.m.  Diabetic neuropathy-continue gabapentin

## 2024-02-19 LAB
ANION GAP SERPL CALCULATED.3IONS-SCNC: 9 MMOL/L
BUN SERPL-MCNC: 33 MG/DL (ref 5–25)
CALCIUM SERPL-MCNC: 9.5 MG/DL (ref 8.4–10.2)
CHLORIDE SERPL-SCNC: 104 MMOL/L (ref 96–108)
CO2 SERPL-SCNC: 29 MMOL/L (ref 21–32)
CREAT SERPL-MCNC: 0.78 MG/DL (ref 0.6–1.3)
ERYTHROCYTE [DISTWIDTH] IN BLOOD BY AUTOMATED COUNT: 13.7 % (ref 11.6–15.1)
GFR SERPL CREATININE-BSD FRML MDRD: 100 ML/MIN/1.73SQ M
GLUCOSE SERPL-MCNC: 127 MG/DL (ref 65–140)
GLUCOSE SERPL-MCNC: 147 MG/DL (ref 65–140)
GLUCOSE SERPL-MCNC: 149 MG/DL (ref 65–140)
GLUCOSE SERPL-MCNC: 168 MG/DL (ref 65–140)
GLUCOSE SERPL-MCNC: 202 MG/DL (ref 65–140)
HCT VFR BLD AUTO: 38.2 % (ref 36.5–49.3)
HGB BLD-MCNC: 12.6 G/DL (ref 12–17)
MCH RBC QN AUTO: 32.7 PG (ref 26.8–34.3)
MCHC RBC AUTO-ENTMCNC: 33 G/DL (ref 31.4–37.4)
MCV RBC AUTO: 99 FL (ref 82–98)
PLATELET # BLD AUTO: 189 THOUSANDS/UL (ref 149–390)
PMV BLD AUTO: 10.7 FL (ref 8.9–12.7)
POTASSIUM SERPL-SCNC: 4.2 MMOL/L (ref 3.5–5.3)
RBC # BLD AUTO: 3.85 MILLION/UL (ref 3.88–5.62)
SODIUM SERPL-SCNC: 142 MMOL/L (ref 135–147)
WBC # BLD AUTO: 10.69 THOUSAND/UL (ref 4.31–10.16)

## 2024-02-19 PROCEDURE — 97530 THERAPEUTIC ACTIVITIES: CPT

## 2024-02-19 PROCEDURE — 99232 SBSQ HOSP IP/OBS MODERATE 35: CPT | Performed by: PHYSICIAN ASSISTANT

## 2024-02-19 PROCEDURE — 82948 REAGENT STRIP/BLOOD GLUCOSE: CPT

## 2024-02-19 PROCEDURE — 80048 BASIC METABOLIC PNL TOTAL CA: CPT | Performed by: PHYSICIAN ASSISTANT

## 2024-02-19 PROCEDURE — 85027 COMPLETE CBC AUTOMATED: CPT | Performed by: PHYSICIAN ASSISTANT

## 2024-02-19 PROCEDURE — 97535 SELF CARE MNGMENT TRAINING: CPT

## 2024-02-19 RX ADMIN — INSULIN LISPRO 1 UNITS: 100 INJECTION, SOLUTION INTRAVENOUS; SUBCUTANEOUS at 12:20

## 2024-02-19 RX ADMIN — CALCIUM 1 TABLET: 500 TABLET ORAL at 09:34

## 2024-02-19 RX ADMIN — GABAPENTIN 400 MG: 400 CAPSULE ORAL at 09:33

## 2024-02-19 RX ADMIN — HYDROXYZINE HYDROCHLORIDE 10 MG: 10 TABLET ORAL at 09:34

## 2024-02-19 RX ADMIN — HEPARIN SODIUM 5000 UNITS: 5000 INJECTION INTRAVENOUS; SUBCUTANEOUS at 06:17

## 2024-02-19 RX ADMIN — ZONISAMIDE 300 MG: 100 CAPSULE ORAL at 21:52

## 2024-02-19 RX ADMIN — CYANOCOBALAMIN TAB 500 MCG 1000 MCG: 500 TAB at 09:33

## 2024-02-19 RX ADMIN — DICLOFENAC SODIUM 2 G: 10 GEL TOPICAL at 21:55

## 2024-02-19 RX ADMIN — LACOSAMIDE 200 MG: 150 TABLET ORAL at 21:52

## 2024-02-19 RX ADMIN — LACOSAMIDE 150 MG: 150 TABLET ORAL at 09:34

## 2024-02-19 RX ADMIN — DIVALPROEX SODIUM 750 MG: 250 TABLET, DELAYED RELEASE ORAL at 09:32

## 2024-02-19 RX ADMIN — HYDROXYZINE HYDROCHLORIDE 10 MG: 10 TABLET ORAL at 14:05

## 2024-02-19 RX ADMIN — LISINOPRIL 10 MG: 10 TABLET ORAL at 09:33

## 2024-02-19 RX ADMIN — QUETIAPINE 200 MG: 400 TABLET, FILM COATED, EXTENDED RELEASE ORAL at 14:05

## 2024-02-19 RX ADMIN — DOCUSATE SODIUM 100 MG: 100 CAPSULE, LIQUID FILLED ORAL at 09:33

## 2024-02-19 RX ADMIN — B-COMPLEX W/ C & FOLIC ACID TAB 1 TABLET: TAB at 09:33

## 2024-02-19 RX ADMIN — PRAVASTATIN SODIUM 80 MG: 40 TABLET ORAL at 16:56

## 2024-02-19 RX ADMIN — OLANZAPINE 5 MG: 5 TABLET, FILM COATED ORAL at 14:56

## 2024-02-19 RX ADMIN — HEPARIN SODIUM 5000 UNITS: 5000 INJECTION INTRAVENOUS; SUBCUTANEOUS at 14:06

## 2024-02-19 RX ADMIN — QUETIAPINE 400 MG: 400 TABLET, FILM COATED, EXTENDED RELEASE ORAL at 21:53

## 2024-02-19 RX ADMIN — DICLOFENAC SODIUM 2 G: 10 GEL TOPICAL at 09:37

## 2024-02-19 RX ADMIN — DICLOFENAC SODIUM 2 G: 10 GEL TOPICAL at 12:20

## 2024-02-19 RX ADMIN — LEVETIRACETAM 1500 MG: 500 TABLET, FILM COATED ORAL at 09:33

## 2024-02-19 RX ADMIN — INSULIN LISPRO 1 UNITS: 100 INJECTION, SOLUTION INTRAVENOUS; SUBCUTANEOUS at 21:54

## 2024-02-19 RX ADMIN — QUETIAPINE 200 MG: 400 TABLET, FILM COATED, EXTENDED RELEASE ORAL at 09:42

## 2024-02-19 RX ADMIN — CHOLECALCIFEROL TAB 25 MCG (1000 UNIT) 1000 UNITS: 25 TAB at 09:34

## 2024-02-19 RX ADMIN — GABAPENTIN 400 MG: 400 CAPSULE ORAL at 20:36

## 2024-02-19 RX ADMIN — DIVALPROEX SODIUM 1000 MG: 500 TABLET, DELAYED RELEASE ORAL at 21:52

## 2024-02-19 RX ADMIN — LEVETIRACETAM 1500 MG: 500 TABLET, FILM COATED ORAL at 20:36

## 2024-02-19 RX ADMIN — HEPARIN SODIUM 5000 UNITS: 5000 INJECTION INTRAVENOUS; SUBCUTANEOUS at 21:53

## 2024-02-19 RX ADMIN — CALCIUM 1 TABLET: 500 TABLET ORAL at 20:36

## 2024-02-19 RX ADMIN — CALCIUM 1 TABLET: 500 TABLET ORAL at 16:56

## 2024-02-19 RX ADMIN — GABAPENTIN 400 MG: 400 CAPSULE ORAL at 16:56

## 2024-02-19 RX ADMIN — LORATADINE 10 MG: 10 TABLET ORAL at 09:33

## 2024-02-19 RX ADMIN — LEVOTHYROXINE SODIUM 150 MCG: 75 TABLET ORAL at 06:17

## 2024-02-19 NOTE — OCCUPATIONAL THERAPY NOTE
Occupational Therapy Evaluation     Patient Name: Jairon Oconnell  Today's Date: 2/19/2024  Problem List  Principal Problem:    Nondisplaced fracture of greater trochanter of right femur, initial encounter for closed fracture (HCC)  Active Problems:    Cerebral palsy (HCC)    Type 2 diabetes mellitus with diabetic neuropathy, without long-term current use of insulin (HCC)    Generalized convulsive epilepsy (HCC)    Hyperlipidemia    Essential hypertension    Acquired hypothyroidism    Behavior concern in adult    Past Medical History  Past Medical History:   Diagnosis Date    ADRIANA (acute kidney injury) ([facility]) 9/24/2019    Bacteremia due to Gram-positive bacteria 9/7/2021    Buttock wound, left, subsequent encounter 11/5/2021    COVID-19     CP (cerebral palsy) ([facility])     Depression     Diabetes ([facility])     Disease of thyroid gland     Gait disorder     Gluteal abscess 9/6/2021    Hyperlipidemia     Hypertension     Kidney failure     Kidney stones     Moderate protein-calorie malnutrition ([facility]) 12/22/2021    Osteoporosis     Pressure injury of left buttock, stage 4 ([facility]) 3/9/2022    Psychiatric disorder     Scoliosis     Seizures ([facility])     Sepsis ([facility]) 9/25/2019    Thyroid disease     UTI (urinary tract infection)      Past Surgical History  Past Surgical History:   Procedure Laterality Date    APPENDECTOMY      IR PICC PLACEMENT SINGLE LUMEN  9/13/2021    IR PICC PLACEMENT SINGLE LUMEN  9/16/2021 02/19/24 0945   OT Last Visit   OT Visit Date 02/19/24   Note Type   Note Type Treatment   Pain Assessment   Pain Assessment Tool FLACC   Pain Rating: FLACC (Rest) - Face 0   Pain Rating: FLACC (Rest) - Legs 0   Pain Rating: FLACC (Rest) - Activity 0   Pain Rating: FLACC (Rest) - Cry 0   Pain Rating: FLACC (Rest) - Consolability 0   Score: FLACC (Rest) 0   Pain Rating: FLACC (Activity) - Face 1   Pain Rating: FLACC (Activity) - Legs 1   Pain Rating: FLACC (Activity) - Activity 1   Pain Rating: FLACC (Activity) - Cry  "0   Pain Rating: FLACC (Activity) - Consolability 0   Score: FLACC (Activity) 3   Restrictions/Precautions   Weight Bearing Precautions Per Order Yes   RLE Weight Bearing Per Order WBAT   Braces or Orthoses   (none reported)   Other Precautions Cognitive;Chair Alarm;Bed Alarm;Seizure;Fall Risk;Pain;WBS   ADL   Where Assessed Edge of bed   UB Bathing Assistance 3  Moderate Assistance   UB Bathing Deficit Increased time to complete;Chest;Right arm;Left arm;Abdomen;Verbal cueing   UB Bathing Comments mod A to bathe upper body d/t cognitive deficits and encouragement to participate   LB Bathing Assistance 2  Maximal Assistance   LB Bathing Deficit Right upper leg;Left upper leg;Right lower leg including foot;Left lower leg including foot   LB Bathing Comments max A to complete LB bathing d/t refusal and limited participation in ADL session   UB Dressing Assistance 4  Minimal Assistance   UB Dressing Deficit Thread RUE;Thread LUE;Fasteners   UB Dressing Comments to don hospital gown   Bed Mobility   Rolling L 3  Moderate assistance   Additional items Assist x 1;Bedrails;Increased time required;Verbal cues;LE management   Supine to Sit 3  Moderate assistance   Additional items Assist x 1;HOB elevated;Bedrails;Increased time required;LE management;Verbal cues   Sit to Supine 3  Moderate assistance   Additional items Bedrails;Increased time required;Verbal cues;LE management;Assist x 2  (second assist for upper body management and trunk control)   Additional Comments Pt tolerated EOB sitting ~x15 mins with F balance to complete ADLs.   Transfers   Sit to Stand 3  Moderate assistance   Additional items Assist x 2;Increased time required;Verbal cues   Stand to Sit 3  Moderate assistance   Additional items Assist x 2;Increased time required;Verbal cues   Additional Comments HHA; verbal and tactile cues to complete STS from EOB after extensive encouragement cues to participate   Subjective   Subjective \"no\"   Cognition "   Overall Cognitive Status Impaired   Arousal/Participation Alert   Attention Difficulty attending to directions   Orientation Level Oriented to person   Memory Unable to assess   Following Commands Follows one step commands with increased time or repetition   Comments Pt agreeable to OT session, occasional physical outburst   Activity Tolerance   Activity Tolerance Patient limited by fatigue;Other (Comment)  (cognition)   Medical Staff Made Aware Yes, nursing staff made aware of session outcomes   Assessment   Assessment Patient participated in Skilled OT session this date with interventions consisting of ADL re training with the use of correct body mechnaics, Energy Conservation techniques, safety awareness and fall prevention techniques,  therapeutic activities to: increase activity tolerance, increase dynamic sit/ stand balance during functional activity , increase postural control, increase trunk control, and increase OOB/ sitting tolerance . Patient agreeable to OT treatment session, upon arrival patient was found supine in bed.  In comparison to previous session, patient with improvements in sitting EOB tolerance . Patient requiring verbal cues for safety, verbal cues for correct technique, and frequent rest periods. Patient continues to be functioning below baseline level, occupational performance remains limited secondary to factors listed above and increased risk for falls and injury.   From OT standpoint, recommendation at time of d/c would with moderate intensity OT resources.   Patient to benefit from continued Occupational Therapy treatment while in the hospital to address deficits as defined above and maximize level of functional independence with ADLs and functional mobility.   Plan   Treatment Interventions ADL retraining;Functional transfer training;UE strengthening/ROM;Endurance training;Patient/family training;Equipment evaluation/education;Compensatory technique education;Energy  conservation;Activityengagement   Goal Expiration Date 02/26/24   OT Treatment Day 5   OT Frequency 3-5x/wk   Discharge Recommendation   Rehab Resource Intensity Level, OT II (Moderate Resource Intensity)   Additional Comments  The patient's raw score on the AM-PAC Daily Activity inpatient short form is 13, standardized score is 32.03, less than 39.4. Patients at this level are likely to benefit from discharge with moderate intensity OT resources. Please refer to the recommendation of the Occupational Therapist for safe discharge planning.   Additional Comments 2 coordination of care with PT [place]   [facility] Daily Activity Inpatient   Lower Body Dressing 2   Bathing 2   Toileting 2   Upper Body Dressing 2   Grooming 2   Eating 3   Daily Activity Raw Score 13   Daily Activity Standardized Score (Calc for Raw Score >=11) 32.03   AM-[facility] Applied Cognition Inpatient   Following a Speech/Presentation 2   Understanding Ordinary Conversation 3   Taking Medications 2   Remembering Where Things Are Placed or Put Away 1   Remembering List of 4-5 Errands 1   Taking Care of Complicated Tasks 1   Applied Cognition Raw Score 10   Applied Cognition Standardized Score 24.98     All needs met, call bell within reach  Riri Pineda MS, OTR/L

## 2024-02-19 NOTE — PLAN OF CARE
Problem: Potential for Falls  Goal: Patient will remain free of falls  Description: INTERVENTIONS:  - Educate patient/family on patient safety including physical limitations  - Instruct patient to call for assistance with activity   - Consult OT/PT to assist with strengthening/mobility   - Keep Call bell within reach  - Keep bed low and locked with side rails adjusted as appropriate  - Keep care items and personal belongings within reach  - Initiate and maintain comfort rounds  - Make Fall Risk Sign visible to staff  - Offer Toileting every 2 Hours, in advance of need  - Initiate/Maintain alarms  - Obtain necessary fall risk management equipment:   - Apply yellow socks and bracelet for high fall risk patients  - Consider moving patient to room near nurses station  Outcome: Progressing     Problem: Prexisting or High Potential for Compromised Skin Integrity  Goal: Skin integrity is maintained or improved  Description: INTERVENTIONS:  - Identify patients at risk for skin breakdown  - Assess and monitor skin integrity  - Assess and monitor nutrition and hydration status  - Monitor labs   - Assess for incontinence   - Turn and reposition patient  - Assist with mobility/ambulation  - Relieve pressure over bony prominences  - Avoid friction and shearing  - Provide appropriate hygiene as needed including keeping skin clean and dry  - Evaluate need for skin moisturizer/barrier cream  - Collaborate with interdisciplinary team   - Patient/family teaching  - Consider wound care consult   Outcome: Progressing     Problem: PAIN - ADULT  Goal: Verbalizes/displays adequate comfort level or baseline comfort level  Description: Interventions:  - Encourage patient to monitor pain and request assistance  - Assess pain using appropriate pain scale  - Administer analgesics based on type and severity of pain and evaluate response  - Implement non-pharmacological measures as appropriate and evaluate response  - Consider cultural and  social influences on pain and pain management  - Notify physician/advanced practitioner if interventions unsuccessful or patient reports new pain  Outcome: Progressing     Problem: INFECTION - ADULT  Goal: Absence or prevention of progression during hospitalization  Description: INTERVENTIONS:  - Assess and monitor for signs and symptoms of infection  - Monitor lab/diagnostic results  - Monitor all insertion sites, i.e. indwelling lines, tubes, and drains  - Monitor endotracheal if appropriate and nasal secretions for changes in amount and color  - [place] appropriate cooling/warming therapies per order  - Administer medications as ordered  - Instruct and encourage patient and family to use good hand hygiene technique  - Identify and instruct in appropriate isolation precautions for identified infection/condition  Outcome: Progressing     Problem: SAFETY ADULT  Goal: Maintain or return to baseline ADL function  Description: INTERVENTIONS:  -  Assess patient's ability to carry out ADLs; assess patient's baseline for ADL function and identify physical deficits which impact ability to perform ADLs (bathing, care of mouth/teeth, toileting, grooming, dressing, etc.)  - Assess/evaluate cause of self-care deficits   - Assess range of motion  - Assess patient's mobility; develop plan if impaired  - Assess patient's need for assistive devices and provide as appropriate  - Encourage maximum independence but intervene and supervise when necessary  - Involve family in performance of ADLs  - Assess for home care needs following discharge   - Consider OT consult to assist with ADL evaluation and planning for discharge  - Provide patient education as appropriate  Outcome: Progressing  Goal: Maintains/Returns to pre admission functional level  Description: INTERVENTIONS:  - Perform AM-PAC 6 Click Basic Mobility/ Daily Activity assessment daily.  - Set and communicate daily mobility goal to care team and patient/family/caregiver.    - Collaborate with rehabilitation services on mobility goals if consulted  - Reposition patient every 2 hours.  - Out of bed for toileting/meals  - Record patient progress and toleration of activity level   Outcome: Progressing

## 2024-02-19 NOTE — PROGRESS NOTES
[facility]  Progress Note  Name: Jairon Oconnell I  MRN: [id number]  Unit/Bed#: -01 I Date of Admission: 2/4/2024   Date of Service: 2/19/2024 I Hospital Day: 15    Assessment/Plan   * Nondisplaced fracture of greater trochanter of right femur, initial encounter for closed fracture (HCC)  Assessment & Plan  Status post a mechanical fall prior to arrival   CT right lower extremity imaging confirmed a comminuted and nondisplaced fractures of the right greater trochanter with no evidence of femoral neck fracture.   Orthopedic surgery consultation appreciated- No indication at this time for an acute phase inpatient surgical intervention.   Weightbearing as tolerated  Continue current pain medication management regimen  PT/OT recommending postacute rehab  Status post a psychiatric evaluation-the patient has been cleared from a psych standpoint for rehab at a facility  Case management is working on placement  Medically stable, will discharge when arrangements finalized by case management    Behavior concern in adult  Assessment & Plan  With impulsive behavior at times  Continue Seroquel 200 mg at 8 AM and 2 PM and 400 mg at bedtime  Supportive care  Nursing staff reported patient with agitation on 2/16- now improved  Psychiatry consultation appreciated- discontinued benztropine and decreased hydroxyzine to 10 mg twice daily as patient may be responding adversely to anticholinergic effects. Increased gabapentin to 400 mg po 3 times daily as mood stabilizer. Added prn Zyprexa 5 mg po every 6 hours prn    Acquired hypothyroidism  Assessment & Plan  Continue prehospital levothyroxine    Essential hypertension  Assessment & Plan  Blood pressure controlled on current regimen  Continue lisinopril  Monitor BP per protocol    Hyperlipidemia  Assessment & Plan  Continue pravastatin    Generalized convulsive epilepsy (HCC)  Assessment & Plan  Currently controlled  Continue Depakote 750 mg in the morning  and at 1000 mg at bedtime, Vimpat 150 mg in the morning and 200 mg at bedtime, Keppra 750 mg BID, and zonisamide 300 mg at bedtime      Type 2 diabetes mellitus with diabetic neuropathy, without long-term current use of insulin (HCC)  Assessment & Plan  Lab Results   Component Value Date    HGBA1C 5.5 11/27/2023       Recent Labs     02/18/24  2044 02/19/24  0647 02/19/24  1115 02/19/24  1624   POCGLU 142* 147* 202* 149*         Blood Sugar Average: Last 72 hrs:  (P) [id number]  Oral hypoglycemic medications hold while inpatient resume on discharge  Target blood sugar for the hospital is 140-180  Sugars well-controlled  Continue SSI  CHO diet  Hypoglycemia protocol  BMP a.m.  Diabetic neuropathy-continue gabapentin    Cerebral palsy (HCC)  Assessment & Plan  He lives in a group home and has caregiver  Continue supportive care           VTE Pharmacologic Prophylaxis: VTE Score: 6 High Risk (Score >/= 5) - Pharmacological DVT Prophylaxis Ordered: heparin. Sequential Compression Devices Ordered.    Mobility:   Basic Mobility Inpatient Raw Score: 8  -HLM Goal: 3: Sit at edge of bed  JH-HLM Achieved: 4: Move to chair/commode  HLM Goal achieved. Continue to encourage appropriate mobility.    Patient Centered Rounds: I performed bedside rounds with nursing staff today.   Discussions with Specialists or Other Care Team Provider: nursing, CM    Education and Discussions with Family / Patient:  sister updated periodically, no updated at this time.     Total Time Spent on Date of Encounter in care of patient: 25 mins. This time was spent on one or more of the following: performing physical exam; counseling and coordination of care; obtaining or reviewing history; documenting in the medical record; reviewing/ordering tests, medications or procedures; communicating with other healthcare professionals and discussing with patient's family/caregivers.    Current Length of Stay: 15 day(s)  Current Patient Status:  Inpatient   Certification Statement: The patient will continue to require additional inpatient hospital stay due to need for placement to STR  Discharge Plan:  patient medically stable for discharge- awaiting placement to STR    Code Status: Level 1 - Full Code    Subjective:   The patient was seen and examined. The patient is lying in bed in no acute distress. He offers no complaints.     Objective:     Vitals:   Temp (24hrs), Av.7 °F (36.5 °C), Min:97.3 °F (36.3 °C), Max:98 °F (36.7 °C)    Temp:  [97.3 °F (36.3 °C)-98 °F (36.7 °C)] 98 °F (36.7 °C)  HR:  [96] 96  Resp:  [18] 18  BP: (115-152)/(74-78) 115/74  SpO2:  [94 %] 94 %  Body mass index is 21.14 kg/m².     Input and Output Summary (last 24 hours):     Intake/Output Summary (Last 24 hours) at 2024 1850  Last data filed at 2024 1700  Gross per 24 hour   Intake 960 ml   Output 1000 ml   Net -40 ml       Physical Exam:   Physical Exam  Vitals and nursing note reviewed.   Constitutional:       General: He is awake.      Appearance: He is ill-appearing (chronically).   HENT:      Head: Normocephalic and atraumatic.   Cardiovascular:      Rate and Rhythm: Normal rate and regular rhythm.      Heart sounds: Normal heart sounds.   Pulmonary:      Effort: Pulmonary effort is normal.      Breath sounds: Normal breath sounds.   Abdominal:      Palpations: Abdomen is soft.      Tenderness: There is no abdominal tenderness.   Skin:     General: Skin is warm and dry.   Neurological:      General: No focal deficit present.      Mental Status: He is alert. Mental status is at baseline.   Psychiatric:         Attention and Perception: Attention normal.         Mood and Affect: Mood normal.         Speech: He is noncommunicative (at times can understand 1-2 words).         Behavior: Behavior is cooperative.          Additional Data:     Labs:  Results from last 7 days   Lab Units 24  0508 02/15/24  0602   WBC Thousand/uL 10.69* 7.50   HEMOGLOBIN g/dL 12.6  11.6*   HEMATOCRIT % 38.2 35.8*   PLATELETS Thousands/uL 189 176   NEUTROS PCT %  --  27*   LYMPHS PCT %  --  55*   MONOS PCT %  --  11   EOS PCT %  --  5     Results from last 7 days   Lab Units 02/19/24  0508   SODIUM mmol/L 142   POTASSIUM mmol/L 4.2   CHLORIDE mmol/L 104   CO2 mmol/L 29   BUN mg/dL 33*   CREATININE mg/dL 0.78   ANION GAP mmol/L 9   CALCIUM mg/dL 9.5   GLUCOSE RANDOM mg/dL 127         Results from last 7 days   Lab Units 02/19/24  1624 02/19/24  1115 02/19/24  0647 02/18/24  2044 02/18/24  1555 02/18/24  1113 02/18/24  0711 02/17/24  2144 02/17/24  1645 02/17/24  1111 02/17/24  0651 02/16/24  2117   POC GLUCOSE mg/dl 149* 202* 147* 142* 151* 140 148* 155* 137 168* 169* 196*               Lines/Drains:  Invasive Devices       None                         Imaging: No pertinent imaging reviewed.    Recent Cultures (last 7 days):         Last 24 Hours Medication List:   Current Facility-Administered Medications   Medication Dose Route Frequency Provider Last Rate    acetaminophen  650 mg Oral Q6H PRN HUNTER Main      calcium carbonate  1 tablet Oral TID HUNTER Main      cholecalciferol  1,000 Units Oral Daily HUNTER Main      vitamin B-12  1,000 mcg Oral Every Other Day HUNTER Main      Diclofenac Sodium  2 g Topical 4x Daily Jose Angel Carpenter PA-C      divalproex sodium  1,000 mg Oral HS HUNTER Main      divalproex sodium  750 mg Oral Daily HUNTER Main      docusate sodium  100 mg Oral BID HUNTER Main      gabapentin  400 mg Oral TID Kolby Galeas PA-C      heparin (porcine)  5,000 Units Subcutaneous Q8H RONALD HUNTER Main      hydrOXYzine HCL  10 mg Oral BID Kolby Galeas PA-C      insulin lispro  1-5 Units Subcutaneous TID AC HUNTER Mian      insulin lispro  1-5 Units Subcutaneous HS HUNTER Main      lacosamide  150 mg Oral Daily HUNTER Main      lacosamide  200 mg Oral HS Ashley TUCKER  Florentino, CRNP      levETIRAcetam  1,500 mg Oral Q12H RONALD Ashley Good, CRNP      levothyroxine  150 mcg Oral Early Morning Ashley Good, CRNP      lisinopril  10 mg Oral Daily Ashley Laureanoust, CRNP      loratadine  10 mg Oral Daily Ashley Good, CRNP      multivitamin stress formula  1 tablet Oral Daily Ashley Good, CRNP      OLANZapine  5 mg Oral Q6H PRN Kolby Galeas PA-C      pravastatin  80 mg Oral Daily With Dinner Ashley Good, CRNP      QUEtiapine  200 mg Oral BID Ashley Good, CRNP      QUEtiapine  400 mg Oral HS Ashley Good, CRNP      temazepam  15 mg Oral HS PRN Ashley Good, CRNP      zonisamide  300 mg Oral HS Ashley Good, CRNP          Today, Patient Was Seen By: Kolby Galeas PA-C    **Please Note: This note may have been constructed using a voice recognition system.**

## 2024-02-19 NOTE — PROGRESS NOTES
Pastoral Care Progress Note    2024  Patient: Jairon Oconnell : 1965  Admission Date & Time: 2024 0859  MRN: [id number] CSN: [id number]      CH in consult with RN, refrained from visiting the pt today. Despite his long hospital admission, RN and CH deemed pt in appropriate today for a visit.              24 1300   Clinical Encounter Type   Visited With Patient not available

## 2024-02-19 NOTE — PLAN OF CARE
Problem: OCCUPATIONAL THERAPY ADULT  Goal: Performs self-care activities at highest level of function for planned discharge setting.  See evaluation for individualized goals.  Description: Treatment Interventions: ADL retraining, Functional transfer training, UE strengthening/ROM, Endurance training, Patient/family training, Equipment evaluation/education, Compensatory technique education, Energy conservation, Activityengagement, Cognitive reorientation    See flowsheet documentation for full assessment, interventions and recommendations.   Note: Limitation: Decreased ADL status, Decreased UE strength, Decreased Safe judgement during ADL, Decreased endurance, Decreased self-care trans, Decreased high-level ADLs, Decreased cognition  Prognosis: Fair  Assessment: Patient participated in Skilled OT session this date with interventions consisting of ADL re training with the use of correct body mechnaics, Energy Conservation techniques, safety awareness and fall prevention techniques,  therapeutic activities to: increase activity tolerance, increase dynamic sit/ stand balance during functional activity , increase postural control, increase trunk control, and increase OOB/ sitting tolerance . Patient agreeable to OT treatment session, upon arrival patient was found supine in bed.  In comparison to previous session, patient with improvements in sitting EOB tolerance . Patient requiring verbal cues for safety, verbal cues for correct technique, and frequent rest periods. Patient continues to be functioning below baseline level, occupational performance remains limited secondary to factors listed above and increased risk for falls and injury.   From OT standpoint, recommendation at time of d/c would with moderate intensity OT resources.   Patient to benefit from continued Occupational Therapy treatment while in the hospital to address deficits as defined above and maximize level of functional independence with ADLs and  functional mobility.     Rehab Resource Intensity Level, OT: II (Moderate Resource Intensity)     Riri Pineda MS, OTR/L

## 2024-02-19 NOTE — WOUND OSTOMY CARE
Progress Note - Wound   Jairon Oconnell 58 y.o. male MRN: [id number]  Unit/Bed#: -01 Encounter: [id number]      Assessment Findings:   Wound care weekly follow up for patient admitted with non-displaced fracture of greater trochanter of right femur. Patient is waiting placement for discharge. Patient is awake, alert and cooperative with assessment. He is incontinent of urine and has been of stool today, per RN patient has not had a bowel movement in approx 9 day and today has begun to move his bowels.     1- Bilateral heels intact and blanchable  2- POA-Resolved left buttock stage 3 pressure injury. Wound bed is intact, hyperpigmented with scar tissue.   3- POA-Left upper shoulder resolving abrasion, wound beds are dry, intact, and blanchable, no drainage,    Skin and Wound Care Plan:   1- Ehob offloading cushion to chair when OOB  2- Elevate heels off the bed and while in the recliner with pillows to offload heels  3- Turn and reposition every two hours, use green wedges to assist with offloading  4- Mepilex heel foams to bilateral heels, sally with P, date, and peel back daily for skin assessment, change every 3 days or with soilage or dislodgement.  5- Skin nourishing cream daily  6- Mepilex bordered dressings to the left shoulder and left lateral buttock scarring, sally left shoulder foams with T, and lateral buttock foam dressing with a P, date, peel back daily for skin assessment, change every 3 days and PRN.    See flowsheet for assessment details              Reviewed plan of care with primary RN Rangel  Recommendations written as orders  Wound care team to follow weekly while admitted  Questions or concerns [facility] Wound Care Nurse    Charlene Bonilla BSN, RN, CWON

## 2024-02-19 NOTE — PLAN OF CARE
Problem: Potential for Falls  Goal: Patient will remain free of falls  Description: INTERVENTIONS:  - Educate patient/family on patient safety including physical limitations  - Instruct patient to call for assistance with activity   - Consult OT/PT to assist with strengthening/mobility   - Keep Call bell within reach  - Keep bed low and locked with side rails adjusted as appropriate  - Keep care items and personal belongings within reach  - Initiate and maintain comfort rounds  - Make Fall Risk Sign visible to staff  - Offer Toileting every 2 Hours, in advance of need  - Initiate/Maintain alarms  - Obtain necessary fall risk management equipment:   - Apply yellow socks and bracelet for high fall risk patients  - Consider moving patient to room near nurses station  Outcome: Progressing     Problem: Prexisting or High Potential for Compromised Skin Integrity  Goal: Skin integrity is maintained or improved  Description: INTERVENTIONS:  - Identify patients at risk for skin breakdown  - Assess and monitor skin integrity  - Assess and monitor nutrition and hydration status  - Monitor labs   - Assess for incontinence   - Turn and reposition patient  - Assist with mobility/ambulation  - Relieve pressure over bony prominences  - Avoid friction and shearing  - Provide appropriate hygiene as needed including keeping skin clean and dry  - Evaluate need for skin moisturizer/barrier cream  - Collaborate with interdisciplinary team   - Patient/family teaching  - Consider wound care consult   Outcome: Progressing     Problem: PAIN - ADULT  Goal: Verbalizes/displays adequate comfort level or baseline comfort level  Description: Interventions:  - Encourage patient to monitor pain and request assistance  - Assess pain using appropriate pain scale  - Administer analgesics based on type and severity of pain and evaluate response  - Implement non-pharmacological measures as appropriate and evaluate response  - Consider cultural and  social influences on pain and pain management  - Notify physician/advanced practitioner if interventions unsuccessful or patient reports new pain  Outcome: Progressing     Problem: INFECTION - ADULT  Goal: Absence or prevention of progression during hospitalization  Description: INTERVENTIONS:  - Assess and monitor for signs and symptoms of infection  - Monitor lab/diagnostic results  - Monitor all insertion sites, i.e. indwelling lines, tubes, and drains  - Monitor endotracheal if appropriate and nasal secretions for changes in amount and color  - [place] appropriate cooling/warming therapies per order  - Administer medications as ordered  - Instruct and encourage patient and family to use good hand hygiene technique  - Identify and instruct in appropriate isolation precautions for identified infection/condition  Outcome: Progressing     Problem: SAFETY ADULT  Goal: Maintain or return to baseline ADL function  Description: INTERVENTIONS:  -  Assess patient's ability to carry out ADLs; assess patient's baseline for ADL function and identify physical deficits which impact ability to perform ADLs (bathing, care of mouth/teeth, toileting, grooming, dressing, etc.)  - Assess/evaluate cause of self-care deficits   - Assess range of motion  - Assess patient's mobility; develop plan if impaired  - Assess patient's need for assistive devices and provide as appropriate  - Encourage maximum independence but intervene and supervise when necessary  - Involve family in performance of ADLs  - Assess for home care needs following discharge   - Consider OT consult to assist with ADL evaluation and planning for discharge  - Provide patient education as appropriate  Outcome: Progressing  Goal: Maintains/Returns to pre admission functional level  Description: INTERVENTIONS:  - Perform AM-PAC 6 Click Basic Mobility/ Daily Activity assessment daily.  - Set and communicate daily mobility goal to care team and patient/family/caregiver.    - Collaborate with rehabilitation services on mobility goals if consulted  - Reposition patient every 2 hours.  - Out of bed for toileting/meals  - Record patient progress and toleration of activity level   Outcome: Progressing     Problem: DISCHARGE PLANNING  Goal: Discharge to home or other facility with appropriate resources  Description: INTERVENTIONS:  - Identify barriers to discharge w/patient and caregiver  - Arrange for needed discharge resources and transportation as appropriate  - Identify discharge learning needs (meds, wound care, etc.)  - Refer to Case Management Department for coordinating discharge planning if the patient needs post-hospital services based on physician/advanced practitioner order or complex needs related to functional status, cognitive ability, or social support system  Outcome: Progressing     Problem: Knowledge Deficit  Goal: Patient/family/caregiver demonstrates understanding of disease process, treatment plan, medications, and discharge instructions  Description: Complete learning assessment and assess knowledge base.  Interventions:  - Provide teaching at level of understanding  - Provide teaching via preferred learning methods  Outcome: Progressing     Problem: METABOLIC, FLUID AND ELECTROLYTES - ADULT  Goal: Glucose maintained within target range  Description: INTERVENTIONS:  - Monitor Blood Glucose as ordered  - Assess for signs and symptoms of hyperglycemia and hypoglycemia  - Administer ordered medications to maintain glucose within target range  - Assess nutritional intake and initiate nutrition service referral as needed  Outcome: Progressing     Problem: SKIN/TISSUE INTEGRITY - ADULT  Goal: Skin Integrity remains intact(Skin Breakdown Prevention)  Description: Assess:  -Perform Nolan assessment every shift  -Clean and moisturize skin every shift  -Inspect skin when repositioning, toileting, and assisting with ADLS  -Assess under medical devices such as jacy every  shift  -Assess extremities for adequate circulation and sensation     Bed Management:  -Have minimal linens on bed & keep smooth, unwrinkled  -Change linens as needed when moist or perspiring  -Avoid sitting or lying in one position for more than 2 hours while in bed  -Keep HOB at 20 degrees     Toileting:  -Offer bedside commode  -Assess for incontinence every 2 hours  -Use incontinent care products after each incontinent episode such as hydraguard    Activity:  -Mobilize patient 3 times a day  -Encourage activity and walks on unit  -Encourage or provide ROM exercises   -Turn and reposition patient every 2 Hours  -Use appropriate equipment to lift or move patient in bed  -Instruct/ Assist with weight shifting every 30 min when out of bed in chair  -Consider limitation of chair time 2 hour intervals    Skin Care:  -Avoid use of baby powder, tape, friction and shearing, hot water or constrictive clothing  -Relieve pressure over bony prominences using hydraguard  -Do not massage red bony areas    Next Steps:  -Teach patient strategies to minimize risks such as frequent repositioning   -Consider consults to  interdisciplinary teams such as wound care  Outcome: Progressing  Goal: Pressure injury heals and does not worsen  Description: Interventions:  - Implement low air loss mattress or specialty surface (Criteria met)  - Apply silicone foam dressing  - Instruct/assist with weight shifting every 30 min minutes when in chair   - Limit chair time to 2 hour intervals  - Use special pressure reducing interventions such as cushion when in chair   - Apply fecal or urinary incontinence containment device   - Perform passive or active ROM every shift  - Turn and reposition patient & offload bony prominences every 2 hours   - Utilize friction reducing device or surface for transfers   - Consider consults to  interdisciplinary teams such as wound care  - Use incontinent care products after each incontinent episode such as  hydraguard  - Consider nutrition services referral as needed  Outcome: Progressing     Problem: MUSCULOSKELETAL - ADULT  Goal: Maintain or return mobility to safest level of function  Description: INTERVENTIONS:  - Assess patient's ability to carry out ADLs; assess patient's baseline for ADL function and identify physical deficits which impact ability to perform ADLs (bathing, care of mouth/teeth, toileting, grooming, dressing, etc.)  - Assess/evaluate cause of self-care deficits   - Assess range of motion  - Assess patient's mobility  - Assess patient's need for assistive devices and provide as appropriate  - Encourage maximum independence but intervene and supervise when necessary  - Involve family in performance of ADLs  - Assess for home care needs following discharge   - Consider OT consult to assist with ADL evaluation and planning for discharge  - Provide patient education as appropriate  Outcome: Progressing

## 2024-02-19 NOTE — ASSESSMENT & PLAN NOTE
Lab Results   Component Value Date    HGBA1C 5.5 11/27/2023       Recent Labs     02/18/24  2044 02/19/24  0647 02/19/24  1115 02/19/24  1624   POCGLU 142* 147* 202* 149*         Blood Sugar Average: Last 72 hrs:  (P) [id number]  Oral hypoglycemic medications hold while inpatient resume on discharge  Target blood sugar for the hospital is 140-180  Sugars well-controlled  Continue SSI  CHO diet  Hypoglycemia protocol  BMP a.m.  Diabetic neuropathy-continue gabapentin

## 2024-02-19 NOTE — PHYSICAL THERAPY NOTE
PT Treatment Note    NAME:  Jairon Oconnell  DATE: 02/19/24    AGE:   58 y.o.  Mrn:   [id number]  ADMIT DX:  Hip pain [M25.559]  Right knee pain [M25.561]  Ambulatory dysfunction [R26.2]  Fracture of greater trochanter of right femur (HCC) [S72.111A]  Nondisplaced fracture of greater trochanter of right femur, initial encounter for closed fracture (HCC) [S72114A]  Performed at least 2 patient identifiers during session: ID bracelet and Epic photo       02/19/24 0951   PT Last Visit   PT Visit Date 02/19/24   Note Type   Note Type Treatment   Pain Assessment   Pain Assessment Tool FLACC   Pain Rating: FLACC (Rest) - Face 0   Pain Rating: FLACC (Rest) - Legs 0   Pain Rating: FLACC (Rest) - Activity 0   Pain Rating: FLACC (Rest) - Cry 0   Pain Rating: FLACC (Rest) - Consolability 0   Score: FLACC (Rest) 0   Pain Rating: FLACC (Activity) - Face 1   Pain Rating: FLACC (Activity) - Legs 1   Pain Rating: FLACC (Activity) - Activity 1   Pain Rating: FLACC (Activity) - Cry 0   Pain Rating: FLACC (Activity) - Consolability 0   Score: FLACC (Activity) 3   Restrictions/Precautions   Weight Bearing Precautions Per Order Yes   RLE Weight Bearing Per Order WBAT   Braces or Orthoses   (none reported)   Other Precautions Cognitive;Chair Alarm;Bed Alarm;Seizure;Fall Risk;Pain;WBS  (no active hip abduction)   General   Chart Reviewed Yes   Family/Caregiver Present No   Cognition   Overall Cognitive Status Impaired   Arousal/Participation Alert   Attention Difficulty attending to directions   Orientation Level Oriented to person   Memory Unable to assess   Following Commands Follows one step commands with increased time or repetition   Bed Mobility   Rolling L 3  Moderate assistance   Additional items Assist x 1;Bedrails;Increased time required;Verbal cues;LE management   Supine to Sit 3  Moderate assistance   Additional items Assist x 1;HOB elevated;Bedrails;Increased time required;LE management;Verbal cues   Sit to Supine 3   Moderate assistance   Additional items Bedrails;Increased time required;Verbal cues;LE management;Assist x 2  (pt resistant to care at times but with encouragement and assistance would perform)   Additional Comments pt sat EOB  x 15 mins with occ Min A to correct posture   Transfers   Sit to Stand 3  Moderate assistance   Additional items Assist x 2;Increased time required;Verbal cues   Stand to Sit 3  Moderate assistance   Additional items Assist x 2;Increased time required;Verbal cues   Additional Comments posterior lean noted with stance  (HHA)   Balance   Static Sitting Fair +   Dynamic Sitting Fair   Static Standing Poor +   Endurance Deficit   Endurance Deficit Yes   Activity Tolerance   Activity Tolerance Treatment limited secondary to agitation   Assessment   Prognosis Fair   Assessment Pt seen for PT treatment session this date with interventions consisting of therapeutic activity to improve transfers and increase activity tolerance with functional mobility to decrease fall risk. Pt agreeable to PT treatment session upon arrival, pt found supine in bed, in no apparent distress. Since previous session, pt has made fair progress as evidenced by requiring increased amount of assistance with mobility  Barriers during this session include confusion and fatigue.  Pt continues to be functioning below baseline level, and remains limited 2* factors listed above and including impaired activity tolerance, impaired  balance, poor safety awareness, impaired cognition, decreased endurance, and decreased mobility.  Pt prognosis for achieving goals is fair, pending pt progress with hospitalization/medical status improvements, and indicated by static poor cognition and continued required assistance. PT will continue to see pt during current hospitalization in order to address the deficits listed above and provide interventions consistent w/ POC in effort to achieve goals. Current goals and POC remain appropriate, pt  continues to have rehab potential  Upon conclusion pt supine in bed. The patient's AM-[facility] Basic Mobility Inpatient Short Form Raw Score is 12.  Based on patient presentations and impairments, pt would most appropriately benefit from Level 2 resource intensity upon discharge.  Please also refer to the recommendation of the Physical Therapist for safe discharge planning.   Goals   Patient Goals none expressed due to cognitive status   STG Expiration Date 02/26/24   PT Treatment Day 6   Plan   Treatment/Interventions Functional transfer training;Therapeutic exercise;Endurance training;Bed mobility   Progress Slow progress, cognitive deficits   PT Frequency 3-5x/wk   Discharge Recommendation   Rehab Resource Intensity Level, PT II (Moderate Resource Intensity)   Equipment Recommended   (TBD)   AM-PAC Basic Mobility Inpatient   Turning in Flat Bed Without Bedrails 3   Lying on Back to Sitting on Edge of Flat Bed Without Bedrails 1   Moving Bed to Chair 1   Standing Up From Chair Using Arms 1   Walk in Room 1   Climb 3-5 Stairs With Railing 1   Basic Mobility Inpatient Raw Score 8   Turning Head Towards Sound 4   Follow Simple Instructions 2   Low Function Basic Mobility Raw Score  14   Low Function Basic Mobility Standardized Score  22.01   Highest Level Of Mobility   JH-HLM Goal 3: Sit at edge of bed   JH-HLM Achieved 3: Sit at edge of bed     This session, pt required and most appropriately benefited from skilled OT/PT co-treat due to extensive physical assistance of two therapists, significant regression from functional baseline and decreased activity tolerance.     Time In: 0953  Time Out: 1016  Total Treatment Minutes: 25    Bel Brantley, PT

## 2024-02-19 NOTE — PLAN OF CARE
Problem: PHYSICAL THERAPY ADULT  Goal: Performs mobility at highest level of function for planned discharge setting.  See evaluation for individualized goals.  Description: Treatment/Interventions: Functional transfer training, LE strengthening/ROM, Therapeutic exercise, Endurance training, Cognitive reorientation, Patient/family training, Equipment eval/education, Bed mobility, Gait training, Spoke to nursing, Spoke to case management, OT  Equipment Recommended:  (continue TBD pending progress, RW at this time)       See flowsheet documentation for full assessment, interventions and recommendations.  2/19/2024 1403 by Bel Brantley PT  Outcome: Not Progressing  Note: Prognosis: Fair  Problem List: Decreased strength, Decreased endurance, Impaired balance, Decreased mobility, Decreased coordination, Decreased cognition, Impaired judgement, Decreased safety awareness, Orthopedic restrictions, Pain  Assessment: Pt seen for PT treatment session this date with interventions consisting of therapeutic activity to improve transfers and increase activity tolerance with functional mobility to decrease fall risk. Pt agreeable to PT treatment session upon arrival, pt found supine in bed, in no apparent distress. Since previous session, pt has made fair progress as evidenced by requiring increased amount of assistance with mobility  Barriers during this session include confusion and fatigue.  Pt continues to be functioning below baseline level, and remains limited 2* factors listed above and including impaired activity tolerance, impaired  balance, poor safety awareness, impaired cognition, decreased endurance, and decreased mobility.  Pt prognosis for achieving goals is fair, pending pt progress with hospitalization/medical status improvements, and indicated by static poor cognition and continued required assistance. PT will continue to see pt during current hospitalization in order to address the deficits listed  above and provide interventions consistent w/ POC in effort to achieve goals. Current goals and POC remain appropriate, pt continues to have rehab potential  Upon conclusion pt supine in bed. The patient's AM-PAC Basic Mobility Inpatient Short Form Raw Score is 12.  Based on patient presentations and impairments, pt would most appropriately benefit from Level 2 resource intensity upon discharge.  Please also refer to the recommendation of the Physical Therapist for safe discharge planning.  Barriers to Discharge: Inaccessible home environment     Rehab Resource Intensity Level, PT: II (Moderate Resource Intensity)    See flowsheet documentation for full assessment.     2/19/2024 1403 by Bel Brantley, PT  Outcome: Progressing  Note: Prognosis: Fair  Problem List: Decreased strength, Decreased endurance, Impaired balance, Decreased mobility, Decreased coordination, Decreased cognition, Impaired judgement, Decreased safety awareness, Orthopedic restrictions, Pain  Assessment: Pt seen for PT treatment session this date with interventions consisting of therapeutic activity to improve transfers and increase activity tolerance with functional mobility to decrease fall risk. Pt agreeable to PT treatment session upon arrival, pt found supine in bed, in no apparent distress. Since previous session, pt has made fair progress as evidenced by requiring increased amount of assistance with mobility  Barriers during this session include confusion and fatigue.  Pt continues to be functioning below baseline level, and remains limited 2* factors listed above and including impaired activity tolerance, impaired  balance, poor safety awareness, impaired cognition, decreased endurance, and decreased mobility.  Pt prognosis for achieving goals is fair, pending pt progress with hospitalization/medical status improvements, and indicated by static poor cognition and continued required assistance. PT will continue to see pt during  current hospitalization in order to address the deficits listed above and provide interventions consistent w/ POC in effort to achieve goals. Current goals and POC remain appropriate, pt continues to have rehab potential  Upon conclusion pt supine in bed. The patient's AM-PAC Basic Mobility Inpatient Short Form Raw Score is 12.  Based on patient presentations and impairments, pt would most appropriately benefit from Level 2 resource intensity upon discharge.  Please also refer to the recommendation of the Physical Therapist for safe discharge planning.  Barriers to Discharge: Inaccessible home environment     Rehab Resource Intensity Level, PT: II (Moderate Resource Intensity)    See flowsheet documentation for full assessment.

## 2024-02-20 ENCOUNTER — APPOINTMENT (OUTPATIENT)
Dept: OCCUPATIONAL THERAPY | Facility: CLINIC | Age: 59
End: 2024-02-20
Payer: MEDICARE

## 2024-02-20 LAB
GLUCOSE SERPL-MCNC: 117 MG/DL (ref 65–140)
GLUCOSE SERPL-MCNC: 141 MG/DL (ref 65–140)
GLUCOSE SERPL-MCNC: 167 MG/DL (ref 65–140)
GLUCOSE SERPL-MCNC: 173 MG/DL (ref 65–140)
GLUCOSE SERPL-MCNC: 189 MG/DL (ref 65–140)

## 2024-02-20 PROCEDURE — 82948 REAGENT STRIP/BLOOD GLUCOSE: CPT

## 2024-02-20 PROCEDURE — 99232 SBSQ HOSP IP/OBS MODERATE 35: CPT | Performed by: PHYSICIAN ASSISTANT

## 2024-02-20 RX ADMIN — QUETIAPINE 200 MG: 400 TABLET, FILM COATED, EXTENDED RELEASE ORAL at 08:38

## 2024-02-20 RX ADMIN — OLANZAPINE 5 MG: 5 TABLET, FILM COATED ORAL at 14:06

## 2024-02-20 RX ADMIN — LORATADINE 10 MG: 10 TABLET ORAL at 08:31

## 2024-02-20 RX ADMIN — HEPARIN SODIUM 5000 UNITS: 5000 INJECTION INTRAVENOUS; SUBCUTANEOUS at 21:55

## 2024-02-20 RX ADMIN — CALCIUM 1 TABLET: 500 TABLET ORAL at 08:30

## 2024-02-20 RX ADMIN — DOCUSATE SODIUM 100 MG: 100 CAPSULE, LIQUID FILLED ORAL at 17:00

## 2024-02-20 RX ADMIN — LACOSAMIDE 150 MG: 150 TABLET ORAL at 08:30

## 2024-02-20 RX ADMIN — CHOLECALCIFEROL TAB 25 MCG (1000 UNIT) 1000 UNITS: 25 TAB at 08:30

## 2024-02-20 RX ADMIN — HYDROXYZINE HYDROCHLORIDE 10 MG: 10 TABLET ORAL at 14:05

## 2024-02-20 RX ADMIN — HYDROXYZINE HYDROCHLORIDE 10 MG: 10 TABLET ORAL at 08:31

## 2024-02-20 RX ADMIN — DICLOFENAC SODIUM 2 G: 10 GEL TOPICAL at 17:00

## 2024-02-20 RX ADMIN — DICLOFENAC SODIUM 2 G: 10 GEL TOPICAL at 08:32

## 2024-02-20 RX ADMIN — QUETIAPINE 400 MG: 400 TABLET, FILM COATED, EXTENDED RELEASE ORAL at 21:55

## 2024-02-20 RX ADMIN — GABAPENTIN 400 MG: 400 CAPSULE ORAL at 08:31

## 2024-02-20 RX ADMIN — HEPARIN SODIUM 5000 UNITS: 5000 INJECTION INTRAVENOUS; SUBCUTANEOUS at 14:07

## 2024-02-20 RX ADMIN — CALCIUM 1 TABLET: 500 TABLET ORAL at 16:57

## 2024-02-20 RX ADMIN — ACETAMINOPHEN 650 MG: 325 TABLET ORAL at 22:42

## 2024-02-20 RX ADMIN — LEVOTHYROXINE SODIUM 150 MCG: 75 TABLET ORAL at 05:46

## 2024-02-20 RX ADMIN — DIVALPROEX SODIUM 1000 MG: 500 TABLET, DELAYED RELEASE ORAL at 21:54

## 2024-02-20 RX ADMIN — QUETIAPINE 200 MG: 400 TABLET, FILM COATED, EXTENDED RELEASE ORAL at 14:05

## 2024-02-20 RX ADMIN — DIVALPROEX SODIUM 750 MG: 250 TABLET, DELAYED RELEASE ORAL at 08:30

## 2024-02-20 RX ADMIN — PRAVASTATIN SODIUM 80 MG: 40 TABLET ORAL at 16:57

## 2024-02-20 RX ADMIN — CALCIUM 1 TABLET: 500 TABLET ORAL at 20:39

## 2024-02-20 RX ADMIN — LEVETIRACETAM 1500 MG: 500 TABLET, FILM COATED ORAL at 20:39

## 2024-02-20 RX ADMIN — ZONISAMIDE 300 MG: 100 CAPSULE ORAL at 21:54

## 2024-02-20 RX ADMIN — INSULIN LISPRO 1 UNITS: 100 INJECTION, SOLUTION INTRAVENOUS; SUBCUTANEOUS at 21:55

## 2024-02-20 RX ADMIN — DICLOFENAC SODIUM 2 G: 10 GEL TOPICAL at 21:55

## 2024-02-20 RX ADMIN — HEPARIN SODIUM 5000 UNITS: 5000 INJECTION INTRAVENOUS; SUBCUTANEOUS at 05:45

## 2024-02-20 RX ADMIN — LEVETIRACETAM 1500 MG: 500 TABLET, FILM COATED ORAL at 08:31

## 2024-02-20 RX ADMIN — INSULIN LISPRO 1 UNITS: 100 INJECTION, SOLUTION INTRAVENOUS; SUBCUTANEOUS at 16:58

## 2024-02-20 RX ADMIN — GABAPENTIN 400 MG: 400 CAPSULE ORAL at 20:39

## 2024-02-20 RX ADMIN — LISINOPRIL 10 MG: 10 TABLET ORAL at 08:30

## 2024-02-20 RX ADMIN — GABAPENTIN 400 MG: 400 CAPSULE ORAL at 16:57

## 2024-02-20 RX ADMIN — DICLOFENAC SODIUM 2 G: 10 GEL TOPICAL at 11:52

## 2024-02-20 RX ADMIN — LACOSAMIDE 200 MG: 150 TABLET ORAL at 21:54

## 2024-02-20 RX ADMIN — B-COMPLEX W/ C & FOLIC ACID TAB 1 TABLET: TAB at 08:30

## 2024-02-20 NOTE — PROGRESS NOTES
[facility]  Progress Note  Name: Jairon Oconnell I  MRN: [id number]  Unit/Bed#: -01 I Date of Admission: 2/4/2024   Date of Service: 2/20/2024 I Hospital Day: 16    Assessment/Plan   * Nondisplaced fracture of greater trochanter of right femur, initial encounter for closed fracture (HCC)  Assessment & Plan  Status post a mechanical fall prior to arrival   CT right lower extremity imaging confirmed a comminuted and nondisplaced fractures of the right greater trochanter with no evidence of femoral neck fracture.   Orthopedic surgery consultation appreciated- No indication at this time for an acute phase inpatient surgical intervention.   Weightbearing as tolerated  Continue current pain medication management regimen  PT/OT recommending postacute rehab  Status post a psychiatric evaluation-the patient has been cleared from a psych standpoint for rehab at a facility  Case management is working on placement  Medically stable, will discharge when arrangements finalized by case management    Behavior concern in adult  Assessment & Plan  With impulsive behavior at times  Continue Seroquel 200 mg at 8 AM and 2 PM and 400 mg at bedtime  Supportive care  Nursing staff reported patient with agitation on 2/16- no additional episodes since  Psychiatry consultation appreciated- discontinued benztropine and decreased hydroxyzine to 10 mg twice daily as patient may be responding adversely to anticholinergic effects. Increased gabapentin to 400 mg po 3 times daily as mood stabilizer. Added prn Zyprexa 5 mg po every 6 hours prn    Acquired hypothyroidism  Assessment & Plan  Continue prehospital levothyroxine    Essential hypertension  Assessment & Plan  Blood pressure controlled on current regimen  Continue lisinopril  Monitor BP per protocol    Hyperlipidemia  Assessment & Plan  Continue pravastatin    Generalized convulsive epilepsy (HCC)  Assessment & Plan  Currently controlled  Continue Depakote 750  mg in the morning and at 1000 mg at bedtime, Vimpat 150 mg in the morning and 200 mg at bedtime, Keppra 750 mg BID, and zonisamide 300 mg at bedtime      Type 2 diabetes mellitus with diabetic neuropathy, without long-term current use of insulin (HCC)  Assessment & Plan  Lab Results   Component Value Date    HGBA1C 5.5 11/27/2023       Recent Labs     02/19/24  2046 02/20/24  0653 02/20/24  1105 02/20/24  1603   POCGLU 168* 117 141* 173*         Blood Sugar Average: Last 72 hrs:  (P) 153.8  Oral hypoglycemic medications hold while inpatient resume on discharge  Target blood sugar for the hospital is 140-180  Sugars well-controlled  Continue SSI  CHO diet  Hypoglycemia protocol  BMP a.m.  Diabetic neuropathy-continue gabapentin    Cerebral palsy (HCC)  Assessment & Plan  He lives in a group home and has caregiver  Continue supportive care           VTE Pharmacologic Prophylaxis: VTE Score: 6 High Risk (Score >/= 5) - Pharmacological DVT Prophylaxis Ordered: heparin. Sequential Compression Devices Ordered.    Mobility:   Basic Mobility Inpatient Raw Score: 18  JH-HLM Goal: 6: Walk 10 steps or more  JH-HLM Achieved: 6: Walk 10 steps or more  HLM Goal achieved. Continue to encourage appropriate mobility.    Patient Centered Rounds: I performed bedside rounds with nursing staff today.   Discussions with Specialists or Other Care Team Provider: nursing, CM    Education and Discussions with Family / Patient: Updated  (sister) at bedside.    Total Time Spent on Date of Encounter in care of patient: 25 mins. This time was spent on one or more of the following: performing physical exam; counseling and coordination of care; obtaining or reviewing history; documenting in the medical record; reviewing/ordering tests, medications or procedures; communicating with other healthcare professionals and discussing with patient's family/caregivers.    Current Length of Stay: 16 day(s)  Current Patient Status: Inpatient    Certification Statement: The patient will continue to require additional inpatient hospital stay due to need for STR placement  Discharge Plan:  patient medically stable for discharge- awaiting placement to STR    Code Status: Level 1 - Full Code    Subjective:   The patient was seen and examined. The patient is lying in bed in no acute distress. He denies any complaints. No events overnight.     Objective:     Vitals:   Temp (24hrs), Av.8 °F (36.6 °C), Min:97.5 °F (36.4 °C), Max:98.1 °F (36.7 °C)    Temp:  [97.5 °F (36.4 °C)-98.1 °F (36.7 °C)] 98.1 °F (36.7 °C)  HR:  [98] 98  Resp:  [18] 18  BP: (118-137)/(76-86) 137/86  Body mass index is 21.14 kg/m².     Input and Output Summary (last 24 hours):     Intake/Output Summary (Last 24 hours) at 2024 1642  Last data filed at 2024 1500  Gross per 24 hour   Intake 240 ml   Output 525 ml   Net -285 ml       Physical Exam:   Physical Exam  Vitals and nursing note reviewed.   Constitutional:       General: He is awake.      Appearance: Normal appearance. He is ill-appearing (chronically).   HENT:      Head: Normocephalic and atraumatic.   Cardiovascular:      Rate and Rhythm: Normal rate and regular rhythm.      Heart sounds: Normal heart sounds.   Pulmonary:      Effort: Pulmonary effort is normal.      Breath sounds: Normal breath sounds.   Abdominal:      Palpations: Abdomen is soft.      Tenderness: There is no abdominal tenderness.   Skin:     General: Skin is warm and dry.   Neurological:      General: No focal deficit present.      Mental Status: He is alert and oriented to person, place, and time.   Psychiatric:         Attention and Perception: Attention normal.         Mood and Affect: Mood normal.         Behavior: Behavior is cooperative.      Comments: Patient's speech difficulty to understand at times          Additional Data:     Labs:  Results from last 7 days   Lab Units 24  0508 02/15/24  0602   WBC Thousand/uL 10.69* 7.50    HEMOGLOBIN g/dL 12.6 11.6*   HEMATOCRIT % 38.2 35.8*   PLATELETS Thousands/uL 189 176   NEUTROS PCT %  --  27*   LYMPHS PCT %  --  55*   MONOS PCT %  --  11   EOS PCT %  --  5     Results from last 7 days   Lab Units 02/19/24  0508   SODIUM mmol/L 142   POTASSIUM mmol/L 4.2   CHLORIDE mmol/L 104   CO2 mmol/L 29   BUN mg/dL 33*   CREATININE mg/dL 0.78   ANION GAP mmol/L 9   CALCIUM mg/dL 9.5   GLUCOSE RANDOM mg/dL 127         Results from last 7 days   Lab Units 02/20/24  1603 02/20/24  1105 02/20/24  0653 02/19/24  2046 02/19/24  1624 02/19/24  1115 02/19/24  0647 02/18/24  2044 02/18/24  1555 02/18/24  1113 02/18/24  0711 02/17/24  2144   POC GLUCOSE mg/dl 173* 141* 117 168* 149* 202* 147* 142* 151* 140 148* 155*               Lines/Drains:  Invasive Devices       None                         Imaging: No pertinent imaging reviewed.    Recent Cultures (last 7 days):         Last 24 Hours Medication List:   Current Facility-Administered Medications   Medication Dose Route Frequency Provider Last Rate    acetaminophen  650 mg Oral Q6H PRN HUNTER Main      calcium carbonate  1 tablet Oral TID HUNTER Main      cholecalciferol  1,000 Units Oral Daily HUNTER Main      vitamin B-12  1,000 mcg Oral Every Other Day HUNTER Main      Diclofenac Sodium  2 g Topical 4x Daily Jose Angel Carpenter PA-C      divalproex sodium  1,000 mg Oral HS HUNTER Main      divalproex sodium  750 mg Oral Daily HUNTER Main      docusate sodium  100 mg Oral BID HUNTER Main      gabapentin  400 mg Oral TID Kolby Galeas PA-C      heparin (porcine)  5,000 Units Subcutaneous Q8H RONALD HUNTER Main      hydrOXYzine HCL  10 mg Oral BID Kolby Galeas PA-C      insulin lispro  1-5 Units Subcutaneous TID AC HUNTER Main      insulin lispro  1-5 Units Subcutaneous HS HUNTER Main      lacosamide  150 mg Oral Daily HUNTER Main   200 mg Oral HS Ashley M Florentino, CRNP      levETIRAcetam  1,500 mg Oral Q12H RONALD Ashley M Florentino, CRNP      levothyroxine  150 mcg Oral Early Morning Ashley M Florentino, CRNP      lisinopril  10 mg Oral Daily Ashley M Florentino, CRNP      loratadine  10 mg Oral Daily Ashlye M Florentino, CRNP      multivitamin stress formula  1 tablet Oral Daily Ashley M Florentino, CRNP      OLANZapine  5 mg Oral Q6H PRN Kolby Galeas PA-C      pravastatin  80 mg Oral Daily With Dinner Ashley M Florentino, CRNP      QUEtiapine  200 mg Oral BID Ashley M Florentino, CRNP      QUEtiapine  400 mg Oral HS Ashley M Florentino, CRNP      temazepam  15 mg Oral HS PRN Ashley M Florentino, CRNP      zonisamide  300 mg Oral HS Ashley M Florentino, CRNP          Today, Patient Was Seen By: Kolby Galeas PA-C    **Please Note: This note may have been constructed using a voice recognition system.**

## 2024-02-20 NOTE — PLAN OF CARE
Problem: Potential for Falls  Goal: Patient will remain free of falls  Description: INTERVENTIONS:  - Educate patient/family on patient safety including physical limitations  - Instruct patient to call for assistance with activity   - Consult OT/PT to assist with strengthening/mobility   - Keep Call bell within reach  - Keep bed low and locked with side rails adjusted as appropriate  - Keep care items and personal belongings within reach  - Initiate and maintain comfort rounds  - Make Fall Risk Sign visible to staff  - Offer Toileting every 2 Hours, in advance of need  - Initiate/Maintain alarms  - Obtain necessary fall risk management equipment:   - Apply yellow socks and bracelet for high fall risk patients  - Consider moving patient to room near nurses station  Outcome: Progressing     Problem: Prexisting or High Potential for Compromised Skin Integrity  Goal: Skin integrity is maintained or improved  Description: INTERVENTIONS:  - Identify patients at risk for skin breakdown  - Assess and monitor skin integrity  - Assess and monitor nutrition and hydration status  - Monitor labs   - Assess for incontinence   - Turn and reposition patient  - Assist with mobility/ambulation  - Relieve pressure over bony prominences  - Avoid friction and shearing  - Provide appropriate hygiene as needed including keeping skin clean and dry  - Evaluate need for skin moisturizer/barrier cream  - Collaborate with interdisciplinary team   - Patient/family teaching  - Consider wound care consult   Outcome: Progressing     Problem: PAIN - ADULT  Goal: Verbalizes/displays adequate comfort level or baseline comfort level  Description: Interventions:  - Encourage patient to monitor pain and request assistance  - Assess pain using appropriate pain scale  - Administer analgesics based on type and severity of pain and evaluate response  - Implement non-pharmacological measures as appropriate and evaluate response  - Consider cultural and  social influences on pain and pain management  - Notify physician/advanced practitioner if interventions unsuccessful or patient reports new pain  Outcome: Progressing     Problem: INFECTION - ADULT  Goal: Absence or prevention of progression during hospitalization  Description: INTERVENTIONS:  - Assess and monitor for signs and symptoms of infection  - Monitor lab/diagnostic results  - Monitor all insertion sites, i.e. indwelling lines, tubes, and drains  - Monitor endotracheal if appropriate and nasal secretions for changes in amount and color  - [place] appropriate cooling/warming therapies per order  - Administer medications as ordered  - Instruct and encourage patient and family to use good hand hygiene technique  - Identify and instruct in appropriate isolation precautions for identified infection/condition  Outcome: Progressing     Problem: SAFETY ADULT  Goal: Maintain or return to baseline ADL function  Description: INTERVENTIONS:  -  Assess patient's ability to carry out ADLs; assess patient's baseline for ADL function and identify physical deficits which impact ability to perform ADLs (bathing, care of mouth/teeth, toileting, grooming, dressing, etc.)  - Assess/evaluate cause of self-care deficits   - Assess range of motion  - Assess patient's mobility; develop plan if impaired  - Assess patient's need for assistive devices and provide as appropriate  - Encourage maximum independence but intervene and supervise when necessary  - Involve family in performance of ADLs  - Assess for home care needs following discharge   - Consider OT consult to assist with ADL evaluation and planning for discharge  - Provide patient education as appropriate  Outcome: Progressing  Goal: Maintains/Returns to pre admission functional level  Description: INTERVENTIONS:  - Perform AM-PAC 6 Click Basic Mobility/ Daily Activity assessment daily.  - Set and communicate daily mobility goal to care team and patient/family/caregiver.    - Collaborate with rehabilitation services on mobility goals if consulted  - Reposition patient every 2 hours.  - Out of bed for toileting/meals  - Record patient progress and toleration of activity level   Outcome: Progressing     Problem: DISCHARGE PLANNING  Goal: Discharge to home or other facility with appropriate resources  Description: INTERVENTIONS:  - Identify barriers to discharge w/patient and caregiver  - Arrange for needed discharge resources and transportation as appropriate  - Identify discharge learning needs (meds, wound care, etc.)  - Refer to Case Management Department for coordinating discharge planning if the patient needs post-hospital services based on physician/advanced practitioner order or complex needs related to functional status, cognitive ability, or social support system  Outcome: Progressing     Problem: Knowledge Deficit  Goal: Patient/family/caregiver demonstrates understanding of disease process, treatment plan, medications, and discharge instructions  Description: Complete learning assessment and assess knowledge base.  Interventions:  - Provide teaching at level of understanding  - Provide teaching via preferred learning methods  Outcome: Progressing     Problem: METABOLIC, FLUID AND ELECTROLYTES - ADULT  Goal: Glucose maintained within target range  Description: INTERVENTIONS:  - Monitor Blood Glucose as ordered  - Assess for signs and symptoms of hyperglycemia and hypoglycemia  - Administer ordered medications to maintain glucose within target range  - Assess nutritional intake and initiate nutrition service referral as needed  Outcome: Progressing     Problem: SKIN/TISSUE INTEGRITY - ADULT  Goal: Skin Integrity remains intact(Skin Breakdown Prevention)  Description: Assess:  -Perform Nolan assessment every shift  -Clean and moisturize skin every shift  -Inspect skin when repositioning, toileting, and assisting with ADLS  -Assess under medical devices such as jacy every  shift  -Assess extremities for adequate circulation and sensation     Bed Management:  -Have minimal linens on bed & keep smooth, unwrinkled  -Change linens as needed when moist or perspiring  -Avoid sitting or lying in one position for more than 2 hours while in bed  -Keep HOB at 20 degrees     Toileting:  -Offer bedside commode  -Assess for incontinence every 2 hours  -Use incontinent care products after each incontinent episode such as hydraguard    Activity:  -Mobilize patient 3 times a day  -Encourage activity and walks on unit  -Encourage or provide ROM exercises   -Turn and reposition patient every 2 Hours  -Use appropriate equipment to lift or move patient in bed  -Instruct/ Assist with weight shifting every 30 min when out of bed in chair  -Consider limitation of chair time 2 hour intervals    Skin Care:  -Avoid use of baby powder, tape, friction and shearing, hot water or constrictive clothing  -Relieve pressure over bony prominences using hydraguard  -Do not massage red bony areas    Next Steps:  -Teach patient strategies to minimize risks such as frequent repositioning   -Consider consults to  interdisciplinary teams such as wound care  Outcome: Progressing  Goal: Pressure injury heals and does not worsen  Description: Interventions:  - Implement low air loss mattress or specialty surface (Criteria met)  - Apply silicone foam dressing  - Instruct/assist with weight shifting every 30 min minutes when in chair   - Limit chair time to 2 hour intervals  - Use special pressure reducing interventions such as cushion when in chair   - Apply fecal or urinary incontinence containment device   - Perform passive or active ROM every shift  - Turn and reposition patient & offload bony prominences every 2 hours   - Utilize friction reducing device or surface for transfers   - Consider consults to  interdisciplinary teams such as wound care  - Use incontinent care products after each incontinent episode such as  hydraguard  - Consider nutrition services referral as needed  Outcome: Progressing     Problem: MUSCULOSKELETAL - ADULT  Goal: Maintain or return mobility to safest level of function  Description: INTERVENTIONS:  - Assess patient's ability to carry out ADLs; assess patient's baseline for ADL function and identify physical deficits which impact ability to perform ADLs (bathing, care of mouth/teeth, toileting, grooming, dressing, etc.)  - Assess/evaluate cause of self-care deficits   - Assess range of motion  - Assess patient's mobility  - Assess patient's need for assistive devices and provide as appropriate  - Encourage maximum independence but intervene and supervise when necessary  - Involve family in performance of ADLs  - Assess for home care needs following discharge   - Consider OT consult to assist with ADL evaluation and planning for discharge  - Provide patient education as appropriate  Outcome: Progressing

## 2024-02-20 NOTE — ASSESSMENT & PLAN NOTE
With impulsive behavior at times  Continue Seroquel 200 mg at 8 AM and 2 PM and 400 mg at bedtime  Supportive care  Nursing staff reported patient with agitation on 2/16- no additional episodes since  Psychiatry consultation appreciated- discontinued benztropine and decreased hydroxyzine to 10 mg twice daily as patient may be responding adversely to anticholinergic effects. Increased gabapentin to 400 mg po 3 times daily as mood stabilizer. Added prn Zyprexa 5 mg po every 6 hours prn

## 2024-02-20 NOTE — CASE MANAGEMENT
Case Management Discharge Planning Note    Patient name Jairon Oconnell  Location /-01 MRN [id number]  : 1965 Date 2024       Current Admission Date: 2024  Current Admission Diagnosis:Nondisplaced fracture of greater trochanter of right femur, initial encounter for closed fracture ([facility])   Patient Active Problem List    Diagnosis Date Noted    Nondisplaced fracture of greater trochanter of right femur, initial encounter for closed fracture ([facility]) 2024    Closed nondisplaced fracture of proximal phalanx of left index finger with routine healing, subsequent encounter 10/30/2023    Pressure ulcer of sacral region, stage 3 ([facility]) 2023    Ambulatory dysfunction 2022    Social problem 2021    Hypomagnesemia 2021    Thrombocytopathia ([facility]) 2020    Hyponatremia 2019    Metabolic encephalopathy 2019    Seborrheic dermatitis of scalp 2019    Nearsightedness 2019    Behavior concern in adult 2019    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[facility]) 2019    Vitamin B12 deficiency 2017    Hyperlipidemia 2016    Vitamin D deficiency 2016    Type 2 diabetes mellitus with diabetic neuropathy, without long-term current use of insulin ([facility]) 06/15/2016    Acquired hypothyroidism 06/15/2016    Cataract 2013    Cerebral palsy ([facility]) 2013    Generalized convulsive epilepsy ([facility]) 2013    Essential hypertension 2013    Osteoporosis 2013    Scoliosis 2013      LOS (days): 16  Geometric Mean LOS (GMLOS) (days): 2.8  Days to GMLOS:-13.2     OBJECTIVE:  Risk of Unplanned Readmission Score: 20.68         Current admission status: Inpatient   Preferred Pharmacy:   [facility] [place] - [address]  [address] [address]  Phone: [phone] Fax: [phone]    Primary Care Provider: HUNTER Brown    Primary Insurance: MEDICARE  Secondary  Insurance: [place] [employer]    DISCHARGE DETAILS:    Discharge planning discussed with:: patient and met with his sister at the bedside- cm called Madeline ([facility] CM) at 12:57 pm   Freedom of Choice: Yes  Comments - Freedom of Choice: pt has been accepted to [facility] - family & pt;c choice- cm downloaded the 2nd letter received in [place] - cm called the state today severals times to ask about [facility]leman 3rd letter neeced  CM contacted family/caregiver?: Yes             Contacts  Patient Contacts: Madeline Arvizu case manger  # [phone] LM- Kemi (sister) at the bedside  Relationship to Patient:: Other (Comment) ([facility] CM & sister)  Contact Method: Phone  Phone Number: [phone] Madeline Samanthaun CM & sister at the bedside  Reason/Outcome: Discharge Planning    Requested Home Health Care         Is the patient interested in HHC at discharge?: No    DME Referral Provided  Referral made for DME?: No    Other Referral/Resources/Interventions Provided:  Interventions: Short Term Rehab  Referral Comments: [facility] accepted  we need 1 more determination letter- cm called Valdemar from the stat at 9:55 am LM & 10:12 am LM and spoke with her at 10:44 am - she told me to call Sophie garibay called 10:45 am  # [phone]    Would you like to participate in our [facility] service program?  : No - Declined    Treatment Team Recommendation: Short Term Rehab ([facility] need determination letter_ BLS)                                   IMM Given (Date):: 02/20/24  IMM Given to:: Family (sister)  Family notified:: sister at the bedside

## 2024-02-20 NOTE — ASSESSMENT & PLAN NOTE
Lab Results   Component Value Date    HGBA1C 5.5 11/27/2023       Recent Labs     02/19/24  2046 02/20/24  0653 02/20/24  1105 02/20/24  1603   POCGLU 168* 117 141* 173*         Blood Sugar Average: Last 72 hrs:  (P) 153.8  Oral hypoglycemic medications hold while inpatient resume on discharge  Target blood sugar for the hospital is 140-180  Sugars well-controlled  Continue SSI  CHO diet  Hypoglycemia protocol  BMP a.m.  Diabetic neuropathy-continue gabapentin

## 2024-02-20 NOTE — PLAN OF CARE
Problem: Potential for Falls  Goal: Patient will remain free of falls  Description: INTERVENTIONS:  - Educate patient/family on patient safety including physical limitations  - Instruct patient to call for assistance with activity   - Consult OT/PT to assist with strengthening/mobility   - Keep Call bell within reach  - Keep bed low and locked with side rails adjusted as appropriate  - Keep care items and personal belongings within reach  - Initiate and maintain comfort rounds  - Make Fall Risk Sign visible to staff  - Offer Toileting every 2 Hours, in advance of need  - Initiate/Maintain alarms  - Obtain necessary fall risk management equipment:   - Apply yellow socks and bracelet for high fall risk patients  - Consider moving patient to room near nurses station  Outcome: Progressing     Problem: Prexisting or High Potential for Compromised Skin Integrity  Goal: Skin integrity is maintained or improved  Description: INTERVENTIONS:  - Identify patients at risk for skin breakdown  - Assess and monitor skin integrity  - Assess and monitor nutrition and hydration status  - Monitor labs   - Assess for incontinence   - Turn and reposition patient  - Assist with mobility/ambulation  - Relieve pressure over bony prominences  - Avoid friction and shearing  - Provide appropriate hygiene as needed including keeping skin clean and dry  - Evaluate need for skin moisturizer/barrier cream  - Collaborate with interdisciplinary team   - Patient/family teaching  - Consider wound care consult   Outcome: Progressing     Problem: PAIN - ADULT  Goal: Verbalizes/displays adequate comfort level or baseline comfort level  Description: Interventions:  - Encourage patient to monitor pain and request assistance  - Assess pain using appropriate pain scale  - Administer analgesics based on type and severity of pain and evaluate response  - Implement non-pharmacological measures as appropriate and evaluate response  - Consider cultural and  social influences on pain and pain management  - Notify physician/advanced practitioner if interventions unsuccessful or patient reports new pain  Outcome: Progressing     Problem: INFECTION - ADULT  Goal: Absence or prevention of progression during hospitalization  Description: INTERVENTIONS:  - Assess and monitor for signs and symptoms of infection  - Monitor lab/diagnostic results  - Monitor all insertion sites, i.e. indwelling lines, tubes, and drains  - Monitor endotracheal if appropriate and nasal secretions for changes in amount and color  - [place] appropriate cooling/warming therapies per order  - Administer medications as ordered  - Instruct and encourage patient and family to use good hand hygiene technique  - Identify and instruct in appropriate isolation precautions for identified infection/condition  Outcome: Progressing     Problem: SAFETY ADULT  Goal: Maintain or return to baseline ADL function  Description: INTERVENTIONS:  -  Assess patient's ability to carry out ADLs; assess patient's baseline for ADL function and identify physical deficits which impact ability to perform ADLs (bathing, care of mouth/teeth, toileting, grooming, dressing, etc.)  - Assess/evaluate cause of self-care deficits   - Assess range of motion  - Assess patient's mobility; develop plan if impaired  - Assess patient's need for assistive devices and provide as appropriate  - Encourage maximum independence but intervene and supervise when necessary  - Involve family in performance of ADLs  - Assess for home care needs following discharge   - Consider OT consult to assist with ADL evaluation and planning for discharge  - Provide patient education as appropriate  Outcome: Progressing  Goal: Maintains/Returns to pre admission functional level  Description: INTERVENTIONS:  - Perform AM-PAC 6 Click Basic Mobility/ Daily Activity assessment daily.  - Set and communicate daily mobility goal to care team and patient/family/caregiver.    - Collaborate with rehabilitation services on mobility goals if consulted  - Reposition patient every 2 hours.  - Out of bed for toileting/meals  - Record patient progress and toleration of activity level   Outcome: Progressing     Problem: DISCHARGE PLANNING  Goal: Discharge to home or other facility with appropriate resources  Description: INTERVENTIONS:  - Identify barriers to discharge w/patient and caregiver  - Arrange for needed discharge resources and transportation as appropriate  - Identify discharge learning needs (meds, wound care, etc.)  - Refer to Case Management Department for coordinating discharge planning if the patient needs post-hospital services based on physician/advanced practitioner order or complex needs related to functional status, cognitive ability, or social support system  Outcome: Progressing     Problem: Knowledge Deficit  Goal: Patient/family/caregiver demonstrates understanding of disease process, treatment plan, medications, and discharge instructions  Description: Complete learning assessment and assess knowledge base.  Interventions:  - Provide teaching at level of understanding  - Provide teaching via preferred learning methods  Outcome: Progressing     Problem: METABOLIC, FLUID AND ELECTROLYTES - ADULT  Goal: Glucose maintained within target range  Description: INTERVENTIONS:  - Monitor Blood Glucose as ordered  - Assess for signs and symptoms of hyperglycemia and hypoglycemia  - Administer ordered medications to maintain glucose within target range  - Assess nutritional intake and initiate nutrition service referral as needed  Outcome: Progressing     Problem: SKIN/TISSUE INTEGRITY - ADULT  Goal: Skin Integrity remains intact(Skin Breakdown Prevention)  Description: Assess:  -Perform Nolan assessment every shift  -Clean and moisturize skin every shift  -Inspect skin when repositioning, toileting, and assisting with ADLS  -Assess under medical devices such as jacy every  shift  -Assess extremities for adequate circulation and sensation     Bed Management:  -Have minimal linens on bed & keep smooth, unwrinkled  -Change linens as needed when moist or perspiring  -Avoid sitting or lying in one position for more than 2 hours while in bed  -Keep HOB at 20 degrees     Toileting:  -Offer bedside commode  -Assess for incontinence every 2 hours  -Use incontinent care products after each incontinent episode such as hydraguard    Activity:  -Mobilize patient 3 times a day  -Encourage activity and walks on unit  -Encourage or provide ROM exercises   -Turn and reposition patient every 2 Hours  -Use appropriate equipment to lift or move patient in bed  -Instruct/ Assist with weight shifting every 30 min when out of bed in chair  -Consider limitation of chair time 2 hour intervals    Skin Care:  -Avoid use of baby powder, tape, friction and shearing, hot water or constrictive clothing  -Relieve pressure over bony prominences using hydraguard  -Do not massage red bony areas    Next Steps:  -Teach patient strategies to minimize risks such as frequent repositioning   -Consider consults to  interdisciplinary teams such as wound care  Outcome: Progressing  Goal: Pressure injury heals and does not worsen  Description: Interventions:  - Implement low air loss mattress or specialty surface (Criteria met)  - Apply silicone foam dressing  - Instruct/assist with weight shifting every 30 min minutes when in chair   - Limit chair time to 2 hour intervals  - Use special pressure reducing interventions such as cushion when in chair   - Apply fecal or urinary incontinence containment device   - Perform passive or active ROM every shift  - Turn and reposition patient & offload bony prominences every 2 hours   - Utilize friction reducing device or surface for transfers   - Consider consults to  interdisciplinary teams such as wound care  - Use incontinent care products after each incontinent episode such as  hydraguard  - Consider nutrition services referral as needed  Outcome: Progressing     Problem: MUSCULOSKELETAL - ADULT  Goal: Maintain or return mobility to safest level of function  Description: INTERVENTIONS:  - Assess patient's ability to carry out ADLs; assess patient's baseline for ADL function and identify physical deficits which impact ability to perform ADLs (bathing, care of mouth/teeth, toileting, grooming, dressing, etc.)  - Assess/evaluate cause of self-care deficits   - Assess range of motion  - Assess patient's mobility  - Assess patient's need for assistive devices and provide as appropriate  - Encourage maximum independence but intervene and supervise when necessary  - Involve family in performance of ADLs  - Assess for home care needs following discharge   - Consider OT consult to assist with ADL evaluation and planning for discharge  - Provide patient education as appropriate  Outcome: Progressing

## 2024-02-21 LAB
GLUCOSE SERPL-MCNC: 113 MG/DL (ref 65–140)
GLUCOSE SERPL-MCNC: 143 MG/DL (ref 65–140)
GLUCOSE SERPL-MCNC: 155 MG/DL (ref 65–140)
GLUCOSE SERPL-MCNC: 182 MG/DL (ref 65–140)

## 2024-02-21 PROCEDURE — 82948 REAGENT STRIP/BLOOD GLUCOSE: CPT

## 2024-02-21 PROCEDURE — 99231 SBSQ HOSP IP/OBS SF/LOW 25: CPT

## 2024-02-21 RX ADMIN — PRAVASTATIN SODIUM 80 MG: 40 TABLET ORAL at 17:16

## 2024-02-21 RX ADMIN — DICLOFENAC SODIUM 2 G: 10 GEL TOPICAL at 09:35

## 2024-02-21 RX ADMIN — LEVETIRACETAM 1500 MG: 500 TABLET, FILM COATED ORAL at 21:19

## 2024-02-21 RX ADMIN — LEVOTHYROXINE SODIUM 150 MCG: 75 TABLET ORAL at 06:04

## 2024-02-21 RX ADMIN — GABAPENTIN 400 MG: 400 CAPSULE ORAL at 09:34

## 2024-02-21 RX ADMIN — INSULIN LISPRO 1 UNITS: 100 INJECTION, SOLUTION INTRAVENOUS; SUBCUTANEOUS at 21:20

## 2024-02-21 RX ADMIN — LACOSAMIDE 150 MG: 150 TABLET ORAL at 09:35

## 2024-02-21 RX ADMIN — HYDROXYZINE HYDROCHLORIDE 10 MG: 10 TABLET ORAL at 14:20

## 2024-02-21 RX ADMIN — HEPARIN SODIUM 5000 UNITS: 5000 INJECTION INTRAVENOUS; SUBCUTANEOUS at 06:04

## 2024-02-21 RX ADMIN — DIVALPROEX SODIUM 750 MG: 250 TABLET, DELAYED RELEASE ORAL at 09:35

## 2024-02-21 RX ADMIN — DOCUSATE SODIUM 100 MG: 100 CAPSULE, LIQUID FILLED ORAL at 09:34

## 2024-02-21 RX ADMIN — LEVETIRACETAM 1500 MG: 500 TABLET, FILM COATED ORAL at 08:12

## 2024-02-21 RX ADMIN — CALCIUM 1 TABLET: 500 TABLET ORAL at 17:16

## 2024-02-21 RX ADMIN — QUETIAPINE 400 MG: 400 TABLET, FILM COATED, EXTENDED RELEASE ORAL at 21:20

## 2024-02-21 RX ADMIN — GABAPENTIN 400 MG: 400 CAPSULE ORAL at 17:16

## 2024-02-21 RX ADMIN — QUETIAPINE 200 MG: 400 TABLET, FILM COATED, EXTENDED RELEASE ORAL at 08:12

## 2024-02-21 RX ADMIN — ZONISAMIDE 300 MG: 100 CAPSULE ORAL at 21:20

## 2024-02-21 RX ADMIN — HEPARIN SODIUM 5000 UNITS: 5000 INJECTION INTRAVENOUS; SUBCUTANEOUS at 14:20

## 2024-02-21 RX ADMIN — DICLOFENAC SODIUM 2 G: 10 GEL TOPICAL at 17:16

## 2024-02-21 RX ADMIN — QUETIAPINE 200 MG: 400 TABLET, FILM COATED, EXTENDED RELEASE ORAL at 14:20

## 2024-02-21 RX ADMIN — GABAPENTIN 400 MG: 400 CAPSULE ORAL at 21:20

## 2024-02-21 RX ADMIN — DIVALPROEX SODIUM 1000 MG: 500 TABLET, DELAYED RELEASE ORAL at 21:19

## 2024-02-21 RX ADMIN — DOCUSATE SODIUM 100 MG: 100 CAPSULE, LIQUID FILLED ORAL at 17:16

## 2024-02-21 RX ADMIN — LISINOPRIL 10 MG: 10 TABLET ORAL at 08:12

## 2024-02-21 RX ADMIN — CYANOCOBALAMIN TAB 500 MCG 1000 MCG: 500 TAB at 08:13

## 2024-02-21 RX ADMIN — HYDROXYZINE HYDROCHLORIDE 10 MG: 10 TABLET ORAL at 09:34

## 2024-02-21 RX ADMIN — CALCIUM 1 TABLET: 500 TABLET ORAL at 21:19

## 2024-02-21 RX ADMIN — B-COMPLEX W/ C & FOLIC ACID TAB 1 TABLET: TAB at 08:12

## 2024-02-21 RX ADMIN — CHOLECALCIFEROL TAB 25 MCG (1000 UNIT) 1000 UNITS: 25 TAB at 08:16

## 2024-02-21 RX ADMIN — INSULIN LISPRO 1 UNITS: 100 INJECTION, SOLUTION INTRAVENOUS; SUBCUTANEOUS at 11:50

## 2024-02-21 RX ADMIN — LORATADINE 10 MG: 10 TABLET ORAL at 08:12

## 2024-02-21 RX ADMIN — LACOSAMIDE 200 MG: 150 TABLET ORAL at 21:19

## 2024-02-21 RX ADMIN — CALCIUM 1 TABLET: 500 TABLET ORAL at 09:34

## 2024-02-21 RX ADMIN — HEPARIN SODIUM 5000 UNITS: 5000 INJECTION INTRAVENOUS; SUBCUTANEOUS at 21:20

## 2024-02-21 NOTE — PLAN OF CARE
Problem: Potential for Falls  Goal: Patient will remain free of falls  Description: INTERVENTIONS:  - Educate patient/family on patient safety including physical limitations  - Instruct patient to call for assistance with activity   - Consult OT/PT to assist with strengthening/mobility   - Keep Call bell within reach  - Keep bed low and locked with side rails adjusted as appropriate  - Keep care items and personal belongings within reach  - Initiate and maintain comfort rounds  - Make Fall Risk Sign visible to staff  - Offer Toileting every 2 Hours, in advance of need  - Initiate/Maintain alarms  - Obtain necessary fall risk management equipment:   - Apply yellow socks and bracelet for high fall risk patients  - Consider moving patient to room near nurses station  Outcome: Progressing     Problem: Prexisting or High Potential for Compromised Skin Integrity  Goal: Skin integrity is maintained or improved  Description: INTERVENTIONS:  - Identify patients at risk for skin breakdown  - Assess and monitor skin integrity  - Assess and monitor nutrition and hydration status  - Monitor labs   - Assess for incontinence   - Turn and reposition patient  - Assist with mobility/ambulation  - Relieve pressure over bony prominences  - Avoid friction and shearing  - Provide appropriate hygiene as needed including keeping skin clean and dry  - Evaluate need for skin moisturizer/barrier cream  - Collaborate with interdisciplinary team   - Patient/family teaching  - Consider wound care consult   Outcome: Progressing     Problem: PAIN - ADULT  Goal: Verbalizes/displays adequate comfort level or baseline comfort level  Description: Interventions:  - Encourage patient to monitor pain and request assistance  - Assess pain using appropriate pain scale  - Administer analgesics based on type and severity of pain and evaluate response  - Implement non-pharmacological measures as appropriate and evaluate response  - Consider cultural and  social influences on pain and pain management  - Notify physician/advanced practitioner if interventions unsuccessful or patient reports new pain  Outcome: Progressing     Problem: INFECTION - ADULT  Goal: Absence or prevention of progression during hospitalization  Description: INTERVENTIONS:  - Assess and monitor for signs and symptoms of infection  - Monitor lab/diagnostic results  - Monitor all insertion sites, i.e. indwelling lines, tubes, and drains  - Monitor endotracheal if appropriate and nasal secretions for changes in amount and color  - [place] appropriate cooling/warming therapies per order  - Administer medications as ordered  - Instruct and encourage patient and family to use good hand hygiene technique  - Identify and instruct in appropriate isolation precautions for identified infection/condition  Outcome: Progressing     Problem: SAFETY ADULT  Goal: Maintain or return to baseline ADL function  Description: INTERVENTIONS:  -  Assess patient's ability to carry out ADLs; assess patient's baseline for ADL function and identify physical deficits which impact ability to perform ADLs (bathing, care of mouth/teeth, toileting, grooming, dressing, etc.)  - Assess/evaluate cause of self-care deficits   - Assess range of motion  - Assess patient's mobility; develop plan if impaired  - Assess patient's need for assistive devices and provide as appropriate  - Encourage maximum independence but intervene and supervise when necessary  - Involve family in performance of ADLs  - Assess for home care needs following discharge   - Consider OT consult to assist with ADL evaluation and planning for discharge  - Provide patient education as appropriate  Outcome: Progressing  Goal: Maintains/Returns to pre admission functional level  Description: INTERVENTIONS:  - Perform AM-PAC 6 Click Basic Mobility/ Daily Activity assessment daily.  - Set and communicate daily mobility goal to care team and patient/family/caregiver.    - Collaborate with rehabilitation services on mobility goals if consulted  - Reposition patient every 2 hours.  - Out of bed for toileting/meals  - Record patient progress and toleration of activity level   Outcome: Progressing     Problem: DISCHARGE PLANNING  Goal: Discharge to home or other facility with appropriate resources  Description: INTERVENTIONS:  - Identify barriers to discharge w/patient and caregiver  - Arrange for needed discharge resources and transportation as appropriate  - Identify discharge learning needs (meds, wound care, etc.)  - Refer to Case Management Department for coordinating discharge planning if the patient needs post-hospital services based on physician/advanced practitioner order or complex needs related to functional status, cognitive ability, or social support system  Outcome: Progressing     Problem: Knowledge Deficit  Goal: Patient/family/caregiver demonstrates understanding of disease process, treatment plan, medications, and discharge instructions  Description: Complete learning assessment and assess knowledge base.  Interventions:  - Provide teaching at level of understanding  - Provide teaching via preferred learning methods  Outcome: Progressing     Problem: METABOLIC, FLUID AND ELECTROLYTES - ADULT  Goal: Glucose maintained within target range  Description: INTERVENTIONS:  - Monitor Blood Glucose as ordered  - Assess for signs and symptoms of hyperglycemia and hypoglycemia  - Administer ordered medications to maintain glucose within target range  - Assess nutritional intake and initiate nutrition service referral as needed  Outcome: Progressing     Problem: SKIN/TISSUE INTEGRITY - ADULT  Goal: Skin Integrity remains intact(Skin Breakdown Prevention)  Description: Assess:  -Perform Nolan assessment every shift  -Clean and moisturize skin every shift  -Inspect skin when repositioning, toileting, and assisting with ADLS  -Assess under medical devices such as jacy every  shift  -Assess extremities for adequate circulation and sensation     Bed Management:  -Have minimal linens on bed & keep smooth, unwrinkled  -Change linens as needed when moist or perspiring  -Avoid sitting or lying in one position for more than 2 hours while in bed  -Keep HOB at 20 degrees     Toileting:  -Offer bedside commode  -Assess for incontinence every 2 hours  -Use incontinent care products after each incontinent episode such as hydraguard    Activity:  -Mobilize patient 3 times a day  -Encourage activity and walks on unit  -Encourage or provide ROM exercises   -Turn and reposition patient every 2 Hours  -Use appropriate equipment to lift or move patient in bed  -Instruct/ Assist with weight shifting every 30 min when out of bed in chair  -Consider limitation of chair time 2 hour intervals    Skin Care:  -Avoid use of baby powder, tape, friction and shearing, hot water or constrictive clothing  -Relieve pressure over bony prominences using hydraguard  -Do not massage red bony areas    Next Steps:  -Teach patient strategies to minimize risks such as frequent repositioning   -Consider consults to  interdisciplinary teams such as wound care  Outcome: Progressing  Goal: Pressure injury heals and does not worsen  Description: Interventions:  - Implement low air loss mattress or specialty surface (Criteria met)  - Apply silicone foam dressing  - Instruct/assist with weight shifting every 30 min minutes when in chair   - Limit chair time to 2 hour intervals  - Use special pressure reducing interventions such as cushion when in chair   - Apply fecal or urinary incontinence containment device   - Perform passive or active ROM every shift  - Turn and reposition patient & offload bony prominences every 2 hours   - Utilize friction reducing device or surface for transfers   - Consider consults to  interdisciplinary teams such as wound care  - Use incontinent care products after each incontinent episode such as  hydraguard  - Consider nutrition services referral as needed  Outcome: Progressing     Problem: MUSCULOSKELETAL - ADULT  Goal: Maintain or return mobility to safest level of function  Description: INTERVENTIONS:  - Assess patient's ability to carry out ADLs; assess patient's baseline for ADL function and identify physical deficits which impact ability to perform ADLs (bathing, care of mouth/teeth, toileting, grooming, dressing, etc.)  - Assess/evaluate cause of self-care deficits   - Assess range of motion  - Assess patient's mobility  - Assess patient's need for assistive devices and provide as appropriate  - Encourage maximum independence but intervene and supervise when necessary  - Involve family in performance of ADLs  - Assess for home care needs following discharge   - Consider OT consult to assist with ADL evaluation and planning for discharge  - Provide patient education as appropriate  Outcome: Progressing

## 2024-02-21 NOTE — ASSESSMENT & PLAN NOTE
With impulsive behavior at times  Continue Seroquel 200 mg at 8 AM and 2 PM and 400 mg at bedtime  Supportive care  Nursing staff reported patient with agitation on 2/16- no additional episodes since  Psychiatry input appreciated

## 2024-02-21 NOTE — PROGRESS NOTES
[facility]  Progress Note  Name: Jairon Oconnell I  MRN: [id number]  Unit/Bed#: -01 I Date of Admission: 2/4/2024   Date of Service: 2/21/2024 I Hospital Day: 17    Assessment/Plan   * Nondisplaced fracture of greater trochanter of right femur, initial encounter for closed fracture (HCC)  Assessment & Plan  Status post a mechanical fall prior to arrival   CT right lower extremity imaging confirmed a comminuted and nondisplaced fractures of the right greater trochanter with no evidence of femoral neck fracture.   Orthopedic surgery consultation appreciated- No indication at this time for an acute phase inpatient surgical intervention.   Weightbearing as tolerated  Continue current pain medication-pain controlled on current regimen  PT/OT recommending postacute rehab  Status post a psychiatric evaluation-the patient has been cleared from a psych standpoint for rehab at a facility  Case management is working on placement  Medically stable, will discharge when arrangements finalized by case management    Generalized convulsive epilepsy ([facility])  Assessment & Plan  Currently controlled  Continue Depakote 750 mg in the morning and at 1000 mg at bedtime, Vimpat 150 mg in the morning and 200 mg at bedtime, Keppra 750 mg BID, and zonisamide 300 mg at bedtime      Cerebral palsy ([facility])  Assessment & Plan  He lives in a group home and has caregiver  Continue supportive care    Behavior concern in adult  Assessment & Plan  With impulsive behavior at times  Continue Seroquel 200 mg at 8 AM and 2 PM and 400 mg at bedtime  Supportive care  Nursing staff reported patient with agitation on 2/16- no additional episodes since  Psychiatry input appreciated    Type 2 diabetes mellitus with diabetic neuropathy, without long-term current use of insulin ([facility])  Assessment & Plan  Lab Results   Component Value Date    HGBA1C 5.5 11/27/2023       Recent Labs     02/20/24  1603 02/20/24 2037 02/20/24 2049  02/21/24  0715   POCGLU 173* 189* 167* 113         Blood Sugar Average: Last 72 hrs:  (P) [id number]  Target blood sugar inpatient 140-180  Blood sugar controlled on current regimen   Oral hypoglycemic medications hold while inpatient resume on discharge  Continue SSI  CHO diet  Hypoglycemia protocol  BMP a.m.  Diabetic neuropathy-continue gabapentin    Hyperlipidemia  Assessment & Plan  Continue statin    Acquired hypothyroidism  Assessment & Plan  Continue prehospital levothyroxine    Essential hypertension  Assessment & Plan  Blood pressure well-controlled  Continue lisinopril  Monitor BP per protocol               VTE Pharmacologic Prophylaxis: VTE Score: 6 High Risk (Score >/= 5) - Pharmacological DVT Prophylaxis Ordered: heparin. Sequential Compression Devices Ordered.    Mobility:   Basic Mobility Inpatient Raw Score: 18  JH-HLM Goal: 6: Walk 10 steps or more  JH-HLM Achieved: 4: Move to chair/commode  HLM Goal NOT achieved. Continue with multidisciplinary rounding and encourage appropriate mobility to improve upon HLM goals.    Patient Centered Rounds: I performed bedside rounds with nursing staff today.   Discussions with Specialists or Other Care Team Provider: Nursing, case management    Education and Discussions with Family / Patient: Updated  (sister) via phone.    Total Time Spent on Date of Encounter in care of patient: 37 mins. This time was spent on one or more of the following: performing physical exam; counseling and coordination of care; obtaining or reviewing history; documenting in the medical record; reviewing/ordering tests, medications or procedures; communicating with other healthcare professionals and discussing with patient's family/caregivers.    Current Length of Stay: 17 day(s)  Current Patient Status: Inpatient   Certification Statement: The patient will continue to require additional inpatient hospital stay due to patient requires short-term rehab placement on  discharge awaiting arrangements to be finalized  Discharge Plan: Patient is medically stable for discharge we will discharge when arrangements finalized by case management    Code Status: Level 1 - Full Code    Subjective:   Resting quietly in bed, cooperative with staff.     Objective:     Vitals:   Temp (24hrs), Av.5 °F (36.4 °C), Min:97.2 °F (36.2 °C), Max:97.8 °F (36.6 °C)    Temp:  [97.2 °F (36.2 °C)-97.8 °F (36.6 °C)] 97.8 °F (36.6 °C)  Resp:  [16] 16  BP: (116-122)/(66-70) 122/66  Body mass index is 21.14 kg/m².     Input and Output Summary (last 24 hours):     Intake/Output Summary (Last 24 hours) at 2024 1017  Last data filed at 2024 0952  Gross per 24 hour   Intake 1320 ml   Output 1075 ml   Net 245 ml       Physical Exam:   Physical Exam  Vitals and nursing note reviewed.   Constitutional:       General: He is not in acute distress.     Appearance: He is well-developed.   HENT:      Head: Normocephalic and atraumatic.      Mouth/Throat:      Mouth: Mucous membranes are moist.   Cardiovascular:      Rate and Rhythm: Normal rate and regular rhythm.      Pulses: Normal pulses.      Heart sounds: Normal heart sounds. No murmur heard.  Pulmonary:      Effort: Pulmonary effort is normal. No respiratory distress.      Breath sounds: Normal breath sounds. No wheezing or rales.   Abdominal:      General: Bowel sounds are normal. There is no distension.      Palpations: Abdomen is soft.      Tenderness: There is no abdominal tenderness. There is no guarding.   Musculoskeletal:         General: No swelling. Normal range of motion.   Skin:     General: Skin is warm and dry.      Capillary Refill: Capillary refill takes less than 2 seconds.   Neurological:      General: No focal deficit present.      Mental Status: He is alert. Mental status is at baseline.   Psychiatric:         Mood and Affect: Mood normal.          Additional Data:     Labs:  Results from last 7 days   Lab Units 24  5359  02/15/24  0602   WBC Thousand/uL 10.69* 7.50   HEMOGLOBIN g/dL 12.6 11.6*   HEMATOCRIT % 38.2 35.8*   PLATELETS Thousands/uL 189 176   NEUTROS PCT %  --  27*   LYMPHS PCT %  --  55*   MONOS PCT %  --  11   EOS PCT %  --  5     Results from last 7 days   Lab Units 02/19/24  0508   SODIUM mmol/L 142   POTASSIUM mmol/L 4.2   CHLORIDE mmol/L 104   CO2 mmol/L 29   BUN mg/dL 33*   CREATININE mg/dL 0.78   ANION GAP mmol/L 9   CALCIUM mg/dL 9.5   GLUCOSE RANDOM mg/dL 127         Results from last 7 days   Lab Units 02/21/24  0715 02/20/24  2049 02/20/24  2037 02/20/24  1603 02/20/24  1105 02/20/24  0653 02/19/24  2046 02/19/24  1624 02/19/24  1115 02/19/24  0647 02/18/24  2044 02/18/24  1555   POC GLUCOSE mg/dl 113 167* 189* 173* 141* 117 168* 149* 202* 147* 142* 151*               Lines/Drains:  Invasive Devices       None                         Imaging: Reviewed radiology reports from this admission including: xray(s)    Recent Cultures (last 7 days):         Last 24 Hours Medication List:   Current Facility-Administered Medications   Medication Dose Route Frequency Provider Last Rate    acetaminophen  650 mg Oral Q6H PRN HUNTER Main      calcium carbonate  1 tablet Oral TID Ashley Good, HUNTER      cholecalciferol  1,000 Units Oral Daily Ashley Good, HUNTER      vitamin B-12  1,000 mcg Oral Every Other Day Ashley Good, HUNTER      Diclofenac Sodium  2 g Topical 4x Daily Jose Angel Carpenter PA-C      divalproex sodium  1,000 mg Oral HS Ashley Good, HUNTER      divalproex sodium  750 mg Oral Daily Ashley Good, HUNTER      docusate sodium  100 mg Oral BID Ashley Good, NATHENNP      gabapentin  400 mg Oral TID Kolby Galeas PA-C      heparin (porcine)  5,000 Units Subcutaneous Q8H [facility] HUNTER Main      hydrOXYzine HCL  10 mg Oral BID Kolby Galeas PA-C      insulin lispro  1-5 Units Subcutaneous TID AC Ashley Good, HUNTER      insulin lispro  1-5 Units Subcutaneous HS Ashley Good,  CRNP      lacosamide  150 mg Oral Daily Ashley M Florentino, CRNP      lacosamide  200 mg Oral HS Ashley M Florentino, CRNP      levETIRAcetam  1,500 mg Oral Q12H RONALD Ashley M Florentino, CRNP      levothyroxine  150 mcg Oral Early Morning Ashley M Florentino, CRNP      lisinopril  10 mg Oral Daily Ashley M Florentino, CRNP      loratadine  10 mg Oral Daily Ashley M Florentino, CRNP      multivitamin stress formula  1 tablet Oral Daily Ashley M Florentino, CRNP      OLANZapine  5 mg Oral Q6H PRN Kolby Galeas PA-C      pravastatin  80 mg Oral Daily With Dinner Ashley M Florentino, CRNP      QUEtiapine  200 mg Oral BID Ashley M Florentino, CRNP      QUEtiapine  400 mg Oral HS Ashley M Florentino, CRNP      temazepam  15 mg Oral HS PRN Ashley M Florentino, CRNP      zonisamide  300 mg Oral HS Ashley Good, CRNP          Today, Patient Was Seen By: HUNTER Babcock    **Please Note: This note may have been constructed using a voice recognition system.**

## 2024-02-21 NOTE — ASSESSMENT & PLAN NOTE
Lab Results   Component Value Date    HGBA1C 5.5 11/27/2023       Recent Labs     02/20/24  1603 02/20/24 2037 02/20/24 2049 02/21/24  0715   POCGLU 173* 189* 167* 113         Blood Sugar Average: Last 72 hrs:  (P) [id number]  Target blood sugar inpatient 140-180  Blood sugar controlled on current regimen   Oral hypoglycemic medications hold while inpatient resume on discharge  Continue SSI  CHO diet  Hypoglycemia protocol  BMP a.m.  Diabetic neuropathy-continue gabapentin

## 2024-02-21 NOTE — PLAN OF CARE
Problem: Prexisting or High Potential for Compromised Skin Integrity  Goal: Skin integrity is maintained or improved  Description: INTERVENTIONS:  - Identify patients at risk for skin breakdown  - Assess and monitor skin integrity  - Assess and monitor nutrition and hydration status  - Monitor labs   - Assess for incontinence   - Turn and reposition patient  - Assist with mobility/ambulation  - Relieve pressure over bony prominences  - Avoid friction and shearing  - Provide appropriate hygiene as needed including keeping skin clean and dry  - Evaluate need for skin moisturizer/barrier cream  - Collaborate with interdisciplinary team   - Patient/family teaching  - Consider wound care consult   Outcome: Progressing     Problem: PAIN - ADULT  Goal: Verbalizes/displays adequate comfort level or baseline comfort level  Description: Interventions:  - Encourage patient to monitor pain and request assistance  - Assess pain using appropriate pain scale  - Administer analgesics based on type and severity of pain and evaluate response  - Implement non-pharmacological measures as appropriate and evaluate response  - Consider cultural and social influences on pain and pain management  - Notify physician/advanced practitioner if interventions unsuccessful or patient reports new pain  Outcome: Progressing     Problem: MUSCULOSKELETAL - ADULT  Goal: Maintain or return mobility to safest level of function  Description: INTERVENTIONS:  - Assess patient's ability to carry out ADLs; assess patient's baseline for ADL function and identify physical deficits which impact ability to perform ADLs (bathing, care of mouth/teeth, toileting, grooming, dressing, etc.)  - Assess/evaluate cause of self-care deficits   - Assess range of motion  - Assess patient's mobility  - Assess patient's need for assistive devices and provide as appropriate  - Encourage maximum independence but intervene and supervise when necessary  - Involve family in  performance of ADLs  - Assess for home care needs following discharge   - Consider OT consult to assist with ADL evaluation and planning for discharge  - Provide patient education as appropriate  Outcome: Progressing

## 2024-02-21 NOTE — ASSESSMENT & PLAN NOTE
Status post a mechanical fall prior to arrival   CT right lower extremity imaging confirmed a comminuted and nondisplaced fractures of the right greater trochanter with no evidence of femoral neck fracture.   Orthopedic surgery consultation appreciated- No indication at this time for an acute phase inpatient surgical intervention.   Weightbearing as tolerated  Continue current pain medication-pain controlled on current regimen  PT/OT recommending postacute rehab  Status post a psychiatric evaluation-the patient has been cleared from a psych standpoint for rehab at a facility  Case management is working on placement  Medically stable, will discharge when arrangements finalized by case management

## 2024-02-21 NOTE — CASE MANAGEMENT
Case Management Discharge Planning Note    Patient name Jairon Oconnell  Location /-01 MRN [id number]  : 1965 Date 2024       Current Admission Date: 2024  Current Admission Diagnosis:Nondisplaced fracture of greater trochanter of right femur, initial encounter for closed fracture (Formerly [facility])   Patient Active Problem List    Diagnosis Date Noted    Nondisplaced fracture of greater trochanter of right femur, initial encounter for closed fracture (Formerly [facility]) 2024    Closed nondisplaced fracture of proximal phalanx of left index finger with routine healing, subsequent encounter 10/30/2023    Pressure ulcer of sacral region, stage 3 (Formerly [facility]) 2023    Ambulatory dysfunction 2022    Social problem 2021    Hypomagnesemia 2021    Thrombocytopathia ([facility]ly [facility]) 2020    Hyponatremia 2019    Metabolic encephalopathy 2019    Seborrheic dermatitis of scalp 2019    Nearsightedness 2019    Behavior concern in adult 2019    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[facility]ly [facility]) 2019    Vitamin B12 deficiency 2017    Hyperlipidemia 2016    Vitamin D deficiency 2016    Type 2 diabetes mellitus with diabetic neuropathy, without long-term current use of insulin ([facility]ly [facility]) 06/15/2016    Acquired hypothyroidism 06/15/2016    Cataract 2013    Cerebral palsy ([facility]) 2013    Generalized convulsive epilepsy ([facility]) 2013    Essential hypertension 2013    Osteoporosis 2013    Scoliosis 2013      LOS (days): 17  Geometric Mean LOS (GMLOS) (days): 3.9  Days to GMLOS:-13.3     OBJECTIVE:  Risk of Unplanned Readmission Score: 21.02         Current admission status: Inpatient   Preferred Pharmacy:   [facility] [place] - [address]  [address] [address]  Phone: [phone] Fax: [phone]    Primary Care Provider: HUNTER Brown    Primary Insurance: MEDICARE  Secondary  Insurance: [place] [employer]    DISCHARGE DETAILS:          Comments - Freedom of Choice: pt accepted by [facility]- family & pt's choice                               Other Referral/Resources/Interventions Provided:  Interventions: Short Term Rehab  Referral Comments: pt accepted by [facility]- lani spoke with Rossy from  [facility] - they are able to accept the pt with just the 2 determination letters- pt needs to be off prn Zyprexa for 48hrs and no behavioral problems- pt cannot go to snf as  prn medication - they are able to accept if it is a scheduled medication    Would you like to participate in our [facility] service program?  : No - Declined    Treatment Team Recommendation: Short Term Rehab (Donn pino)

## 2024-02-22 VITALS
RESPIRATION RATE: 18 BRPM | OXYGEN SATURATION: 95 % | TEMPERATURE: 97 F | BODY MASS INDEX: 21.11 KG/M2 | DIASTOLIC BLOOD PRESSURE: 77 MMHG | WEIGHT: 155.87 LBS | SYSTOLIC BLOOD PRESSURE: 130 MMHG | HEART RATE: 66 BPM | HEIGHT: 72 IN

## 2024-02-22 LAB
ANION GAP SERPL CALCULATED.3IONS-SCNC: 9 MMOL/L
BASOPHILS # BLD AUTO: 0.07 THOUSANDS/ÂΜL (ref 0–0.1)
BASOPHILS NFR BLD AUTO: 1 % (ref 0–1)
BUN SERPL-MCNC: 27 MG/DL (ref 5–25)
CALCIUM SERPL-MCNC: 9.2 MG/DL (ref 8.4–10.2)
CHLORIDE SERPL-SCNC: 101 MMOL/L (ref 96–108)
CO2 SERPL-SCNC: 27 MMOL/L (ref 21–32)
CREAT SERPL-MCNC: 0.75 MG/DL (ref 0.6–1.3)
EOSINOPHIL # BLD AUTO: 0.38 THOUSAND/ÂΜL (ref 0–0.61)
EOSINOPHIL NFR BLD AUTO: 4 % (ref 0–6)
ERYTHROCYTE [DISTWIDTH] IN BLOOD BY AUTOMATED COUNT: 13.8 % (ref 11.6–15.1)
GFR SERPL CREATININE-BSD FRML MDRD: 102 ML/MIN/1.73SQ M
GLUCOSE SERPL-MCNC: 100 MG/DL (ref 65–140)
GLUCOSE SERPL-MCNC: 119 MG/DL (ref 65–140)
GLUCOSE SERPL-MCNC: 122 MG/DL (ref 65–140)
HCT VFR BLD AUTO: 35.2 % (ref 36.5–49.3)
HGB BLD-MCNC: 11.8 G/DL (ref 12–17)
IMM GRANULOCYTES # BLD AUTO: 0.05 THOUSAND/UL (ref 0–0.2)
IMM GRANULOCYTES NFR BLD AUTO: 1 % (ref 0–2)
LYMPHOCYTES # BLD AUTO: 4.37 THOUSANDS/ÂΜL (ref 0.6–4.47)
LYMPHOCYTES NFR BLD AUTO: 49 % (ref 14–44)
MCH RBC QN AUTO: 32.9 PG (ref 26.8–34.3)
MCHC RBC AUTO-ENTMCNC: 33.5 G/DL (ref 31.4–37.4)
MCV RBC AUTO: 98 FL (ref 82–98)
MONOCYTES # BLD AUTO: 1 THOUSAND/ÂΜL (ref 0.17–1.22)
MONOCYTES NFR BLD AUTO: 11 % (ref 4–12)
NEUTROPHILS # BLD AUTO: 2.99 THOUSANDS/ÂΜL (ref 1.85–7.62)
NEUTS SEG NFR BLD AUTO: 34 % (ref 43–75)
NRBC BLD AUTO-RTO: 0 /100 WBCS
PLATELET # BLD AUTO: 163 THOUSANDS/UL (ref 149–390)
PMV BLD AUTO: 11 FL (ref 8.9–12.7)
POTASSIUM SERPL-SCNC: 4.1 MMOL/L (ref 3.5–5.3)
RBC # BLD AUTO: 3.59 MILLION/UL (ref 3.88–5.62)
SARS-COV-2 AG UPPER RESP QL IA: NEGATIVE
SARS-COV-2 AG UPPER RESP QL IA: NORMAL
SODIUM SERPL-SCNC: 137 MMOL/L (ref 135–147)
WBC # BLD AUTO: 8.86 THOUSAND/UL (ref 4.31–10.16)

## 2024-02-22 PROCEDURE — 85025 COMPLETE CBC W/AUTO DIFF WBC: CPT

## 2024-02-22 PROCEDURE — 80048 BASIC METABOLIC PNL TOTAL CA: CPT

## 2024-02-22 PROCEDURE — 99239 HOSP IP/OBS DSCHRG MGMT >30: CPT

## 2024-02-22 PROCEDURE — 82948 REAGENT STRIP/BLOOD GLUCOSE: CPT

## 2024-02-22 RX ORDER — CLONAZEPAM 0.25 MG/1
0.25 TABLET, ORALLY DISINTEGRATING ORAL AS NEEDED
Qty: 10 TABLET | Refills: 0 | Status: ON HOLD | OUTPATIENT
Start: 2024-02-22

## 2024-02-22 RX ORDER — LACOSAMIDE 200 MG/1
TABLET ORAL
Qty: 30 TABLET | Refills: 0 | Status: ON HOLD | OUTPATIENT
Start: 2024-02-22

## 2024-02-22 RX ORDER — HYDROXYZINE HYDROCHLORIDE 10 MG/1
10 TABLET, FILM COATED ORAL 2 TIMES DAILY
Qty: 30 TABLET | Refills: 0 | Status: ON HOLD | OUTPATIENT
Start: 2024-02-22

## 2024-02-22 RX ORDER — GABAPENTIN 400 MG/1
400 CAPSULE ORAL 3 TIMES DAILY
Status: ON HOLD
Start: 2024-02-22

## 2024-02-22 RX ORDER — LACOSAMIDE 150 MG/1
TABLET ORAL
Qty: 30 TABLET | Refills: 0 | Status: ON HOLD | OUTPATIENT
Start: 2024-02-22

## 2024-02-22 RX ADMIN — LACOSAMIDE 150 MG: 150 TABLET ORAL at 09:03

## 2024-02-22 RX ADMIN — LISINOPRIL 10 MG: 10 TABLET ORAL at 09:04

## 2024-02-22 RX ADMIN — LEVOTHYROXINE SODIUM 150 MCG: 75 TABLET ORAL at 05:26

## 2024-02-22 RX ADMIN — HEPARIN SODIUM 5000 UNITS: 5000 INJECTION INTRAVENOUS; SUBCUTANEOUS at 05:26

## 2024-02-22 RX ADMIN — HYDROXYZINE HYDROCHLORIDE 10 MG: 10 TABLET ORAL at 14:10

## 2024-02-22 RX ADMIN — DIVALPROEX SODIUM 750 MG: 250 TABLET, DELAYED RELEASE ORAL at 09:03

## 2024-02-22 RX ADMIN — DOCUSATE SODIUM 100 MG: 100 CAPSULE, LIQUID FILLED ORAL at 09:07

## 2024-02-22 RX ADMIN — QUETIAPINE 200 MG: 400 TABLET, FILM COATED, EXTENDED RELEASE ORAL at 09:20

## 2024-02-22 RX ADMIN — B-COMPLEX W/ C & FOLIC ACID TAB 1 TABLET: TAB at 09:07

## 2024-02-22 RX ADMIN — GABAPENTIN 400 MG: 400 CAPSULE ORAL at 09:07

## 2024-02-22 RX ADMIN — LEVETIRACETAM 1500 MG: 500 TABLET, FILM COATED ORAL at 09:04

## 2024-02-22 RX ADMIN — CALCIUM 1 TABLET: 500 TABLET ORAL at 09:07

## 2024-02-22 RX ADMIN — LORATADINE 10 MG: 10 TABLET ORAL at 09:07

## 2024-02-22 RX ADMIN — HYDROXYZINE HYDROCHLORIDE 10 MG: 10 TABLET ORAL at 09:04

## 2024-02-22 RX ADMIN — DICLOFENAC SODIUM 2 G: 10 GEL TOPICAL at 09:09

## 2024-02-22 RX ADMIN — CHOLECALCIFEROL TAB 25 MCG (1000 UNIT) 1000 UNITS: 25 TAB at 09:07

## 2024-02-22 RX ADMIN — QUETIAPINE 200 MG: 400 TABLET, FILM COATED, EXTENDED RELEASE ORAL at 14:09

## 2024-02-22 NOTE — ASSESSMENT & PLAN NOTE
Lab Results   Component Value Date    HGBA1C 5.5 11/27/2023       Recent Labs     02/21/24  1552 02/21/24 2035 02/22/24  0744 02/22/24  1144   POCGLU 143* 182* 100 122         Blood Sugar Average: Last 72 hrs:  (P) 151.2    Continue prehospital diabetic regimen on discharge

## 2024-02-22 NOTE — PLAN OF CARE
Problem: PAIN - ADULT  Goal: Verbalizes/displays adequate comfort level or baseline comfort level  Description: Interventions:  - Encourage patient to monitor pain and request assistance  - Assess pain using appropriate pain scale  - Administer analgesics based on type and severity of pain and evaluate response  - Implement non-pharmacological measures as appropriate and evaluate response  - Consider cultural and social influences on pain and pain management  - Notify physician/advanced practitioner if interventions unsuccessful or patient reports new pain  Outcome: Progressing     Problem: INFECTION - ADULT  Goal: Absence or prevention of progression during hospitalization  Description: INTERVENTIONS:  - Assess and monitor for signs and symptoms of infection  - Monitor lab/diagnostic results  - Monitor all insertion sites, i.e. indwelling lines, tubes, and drains  - Monitor endotracheal if appropriate and nasal secretions for changes in amount and color  - [place] appropriate cooling/warming therapies per order  - Administer medications as ordered  - Instruct and encourage patient and family to use good hand hygiene technique  - Identify and instruct in appropriate isolation precautions for identified infection/condition  Outcome: Progressing     Problem: SKIN/TISSUE INTEGRITY - ADULT  Goal: Skin Integrity remains intact(Skin Breakdown Prevention)  Description: Assess:  -Perform Nolan assessment every shift  -Clean and moisturize skin every shift  -Inspect skin when repositioning, toileting, and assisting with ADLS  -Assess under medical devices such as jacy every shift  -Assess extremities for adequate circulation and sensation     Bed Management:  -Have minimal linens on bed & keep smooth, unwrinkled  -Change linens as needed when moist or perspiring  -Avoid sitting or lying in one position for more than 2 hours while in bed  -Keep HOB at 20 degrees     Toileting:  -Offer bedside commode  -Assess for  incontinence every 2 hours  -Use incontinent care products after each incontinent episode such as hydraguard    Activity:  -Mobilize patient 3 times a day  -Encourage activity and walks on unit  -Encourage or provide ROM exercises   -Turn and reposition patient every 2 Hours  -Use appropriate equipment to lift or move patient in bed  -Instruct/ Assist with weight shifting every 30 min when out of bed in chair  -Consider limitation of chair time 2 hour intervals    Skin Care:  -Avoid use of baby powder, tape, friction and shearing, hot water or constrictive clothing  -Relieve pressure over bony prominences using hydraguard  -Do not massage red bony areas    Next Steps:  -Teach patient strategies to minimize risks such as frequent repositioning   -Consider consults to  interdisciplinary teams such as wound care  Outcome: Progressing

## 2024-02-22 NOTE — TELEPHONE ENCOUNTER
Call placed to [employer], [phone]. Approved through 12/31/2024.     Determination letter to be refaxed.

## 2024-02-22 NOTE — DISCHARGE SUMMARY
[facility]  Discharge- Jairon Oconnell 1965, 58 y.o. male MRN: [id number]  Unit/Bed#: [facility] Encounter: [id number]  Primary Care Provider: HUNTER Brown   Date and time admitted to hospital: 2/4/2024  8:59 AM    * Nondisplaced fracture of greater trochanter of right femur, initial encounter for closed fracture (HCC)  Assessment & Plan  Status post a mechanical fall prior to arrival   CT right lower extremity imaging confirmed a comminuted and nondisplaced fractures of the right greater trochanter with no evidence of femoral neck fracture.   Orthopedic surgery consultation appreciated- No indication at this time for an acute phase inpatient surgical intervention.   Weightbearing as tolerated  PT/OT recommending postacute rehab  Status post a psychiatric evaluation-the patient has been cleared from a psych standpoint for rehab at a facility  Medically stable, discharge to skilled nursing today as arranged by case management    Generalized convulsive epilepsy ([facility])  Assessment & Plan  Currently controlled  Continue Depakote 750 mg in the morning and at 1000 mg at bedtime, Vimpat 150 mg in the morning and 200 mg at bedtime, Keppra 750 mg BID, and zonisamide 300 mg at bedtime on discharge      Cerebral palsy ([facility])  Assessment & Plan  Was living in a group home prior to  Patient is being discharged to skilled nursing facility today      Behavior concern in adult  Assessment & Plan  Initially having impulsive behaviors  Patient has now been cooperative with staff-no documented agitation since 2/16  Appreciate input of psychiatry  Continue Seroquel 200 mg at 8 AM and 2 PM and 400 mg at bedtime on discharge    Type 2 diabetes mellitus with diabetic neuropathy, without long-term current use of insulin ([facility])  Assessment & Plan  Lab Results   Component Value Date    HGBA1C 5.5 11/27/2023       Recent Labs     02/21/24  1552 02/21/24  2035 02/22/24  0744 02/22/24  1144   POCGLU 143* 182*  100 122         Blood Sugar Average: Last 72 hrs:  (P) 151.2    Continue prehospital diabetic regimen on discharge    Hyperlipidemia  Assessment & Plan  Continue statin on discharge    Acquired hypothyroidism  Assessment & Plan  Continue prehospital levothyroxine on discharge    Essential hypertension  Assessment & Plan  Blood pressure well-controlled  Continue lisinopril on discharge                Medical Problems       Resolved Problems  Date Reviewed: 2/22/2024   None         Admission Date:   Admission Orders (From admission, onward)       Ordered        02/04/24 1257  INPATIENT ADMISSION  Once                            Admitting Diagnosis: Hip pain [M25.559]  Right knee pain [M25.561]  Ambulatory dysfunction [R26.2]  Fracture of greater trochanter of right femur (HCC) [S72.111A]  Nondisplaced fracture of greater trochanter of right femur, initial encounter for closed fracture (HCC) [S72.114A]    HPI: Patient was sent to the ED from his group home after a fall.  CT right lower extremity in the ER noted a right greater trochanter comminuted displaced fracture.  Patient has a history of cerebral palsy, AAA, depression, type 2 diabetes, hypertension and seizure and psychiatric disorder and has 24-hour caregiver in his group home.  Orthopedics was consulted, determined there was no indication for surgical intervention.  Patient was allowed weightbearing as tolerated to the right lower extremity.  PT and OT were consulted.  Patient was admitted to [facility] service.    Procedures Performed: No orders of the defined types were placed in this encounter.      Summary of Hospital Course: For full details regarding patient's hospital stay please refer to the contents of the chart and H&P as dictated by Ashley HARRISON.  In brief, patient was sent to the ED from his group home after a fall at home.  CT of the right lower extremity in the ER noted a right greater trochanteric comminuted displaced fracture.  Orthopedics was  consulted and determined patient did not need surgical intervention.  He is weightbearing as tolerated on the right lower extremity.  PT/OT consulted recommended postacute rehab on discharge.  Patient did have a psychiatric history with impulsive behavior at times.  Psychiatry did evaluate patient while inpatient and felt no need for inpatient psychiatric stay.  Patient has been cooperative on his current regimen without any behavioral issues since 2/16.  Patient is medically stable today and will be discharged to skilled nursing facility as arranged by case management.    Significant Findings, Care, Treatment and Services Provided:   2/4 right knee x-ray: No acute osseous abnormality  2/4 right pelvic x-ray: Nondisplaced greater trochanteric fracture with mild collapse of the right femoral head  2/4 CT right lower extremity: Mildly comminuted and nondisplaced fracture of the right greater trochanter.  No evidence of femoral neck fracture.  Avascular necrosis of the right femoral head with a subchondral fracture and mild flattening of the femoral head due to evolving subchondral collapse      Complications: None    Condition at Discharge: good         Discharge instructions/Information to patient and family:   See after visit summary for information provided to patient and family.      Provisions for Follow-Up Care:  See after visit summary for information related to follow-up care and any pertinent home health orders.      PCP: HUNTER Brown    Disposition:  Discharged to skilled nursing facility    Planned Readmission: No    Discharge Statement   I spent 38 minutes discharging the patient. This time was spent on the day of discharge. I had direct contact with the patient on the day of discharge. Additional documentation is required if more than 30 minutes were spent on discharge.     Discharge Medications:  See after visit summary for reconciled discharge medications provided to patient and family.

## 2024-02-22 NOTE — PLAN OF CARE
Problem: Potential for Falls  Goal: Patient will remain free of falls  Description: INTERVENTIONS:  - Educate patient/family on patient safety including physical limitations  - Instruct patient to call for assistance with activity   - Consult OT/PT to assist with strengthening/mobility   - Keep Call bell within reach  - Keep bed low and locked with side rails adjusted as appropriate  - Keep care items and personal belongings within reach  - Initiate and maintain comfort rounds  - Make Fall Risk Sign visible to staff  - Offer Toileting every 2 Hours, in advance of need  - Initiate/Maintain alarms  - Obtain necessary fall risk management equipment:   - Apply yellow socks and bracelet for high fall risk patients  - Consider moving patient to room near nurses station  Outcome: Progressing     Problem: Prexisting or High Potential for Compromised Skin Integrity  Goal: Skin integrity is maintained or improved  Description: INTERVENTIONS:  - Identify patients at risk for skin breakdown  - Assess and monitor skin integrity  - Assess and monitor nutrition and hydration status  - Monitor labs   - Assess for incontinence   - Turn and reposition patient  - Assist with mobility/ambulation  - Relieve pressure over bony prominences  - Avoid friction and shearing  - Provide appropriate hygiene as needed including keeping skin clean and dry  - Evaluate need for skin moisturizer/barrier cream  - Collaborate with interdisciplinary team   - Patient/family teaching  - Consider wound care consult   Outcome: Progressing     Problem: PAIN - ADULT  Goal: Verbalizes/displays adequate comfort level or baseline comfort level  Description: Interventions:  - Encourage patient to monitor pain and request assistance  - Assess pain using appropriate pain scale  - Administer analgesics based on type and severity of pain and evaluate response  - Implement non-pharmacological measures as appropriate and evaluate response  - Consider cultural and  social influences on pain and pain management  - Notify physician/advanced practitioner if interventions unsuccessful or patient reports new pain  Outcome: Progressing

## 2024-02-22 NOTE — ASSESSMENT & PLAN NOTE
Was living in a group home prior to  Patient is being discharged to skilled nursing facility today

## 2024-02-22 NOTE — NURSING NOTE
Patient discharged via EMS. Personal belongings packed and sent. Discharged instructions given to EMS.

## 2024-02-22 NOTE — ASSESSMENT & PLAN NOTE
Status post a mechanical fall prior to arrival   CT right lower extremity imaging confirmed a comminuted and nondisplaced fractures of the right greater trochanter with no evidence of femoral neck fracture.   Orthopedic surgery consultation appreciated- No indication at this time for an acute phase inpatient surgical intervention.   Weightbearing as tolerated  PT/OT recommending postacute rehab  Status post a psychiatric evaluation-the patient has been cleared from a psych standpoint for rehab at a facility  Medically stable, discharge to skilled nursing today as arranged by case management

## 2024-02-22 NOTE — ASSESSMENT & PLAN NOTE
Initially having impulsive behaviors  Patient has now been cooperative with staff-no documented agitation since 2/16  Appreciate input of psychiatry  Continue Seroquel 200 mg at 8 AM and 2 PM and 400 mg at bedtime on discharge

## 2024-02-22 NOTE — ASSESSMENT & PLAN NOTE
Currently controlled  Continue Depakote 750 mg in the morning and at 1000 mg at bedtime, Vimpat 150 mg in the morning and 200 mg at bedtime, Keppra 750 mg BID, and zonisamide 300 mg at bedtime on discharge

## 2024-02-23 ENCOUNTER — APPOINTMENT (OUTPATIENT)
Dept: OCCUPATIONAL THERAPY | Facility: CLINIC | Age: 59
End: 2024-02-23
Payer: MEDICARE

## 2024-02-26 ENCOUNTER — TRANSITIONAL CARE MANAGEMENT (OUTPATIENT)
Dept: FAMILY MEDICINE CLINIC | Facility: CLINIC | Age: 59
End: 2024-02-26

## 2024-02-26 DIAGNOSIS — E78.5 HYPERLIPIDEMIA, UNSPECIFIED HYPERLIPIDEMIA TYPE: ICD-10-CM

## 2024-02-26 DIAGNOSIS — E08.00 DIABETES MELLITUS DUE TO UNDERLYING CONDITION WITH HYPEROSMOLARITY WITHOUT COMA, WITHOUT LONG-TERM CURRENT USE OF INSULIN (HCC): ICD-10-CM

## 2024-02-26 RX ORDER — SIMVASTATIN 40 MG
40 TABLET ORAL
Qty: 90 TABLET | Refills: 1 | Status: ON HOLD | OUTPATIENT
Start: 2024-02-26

## 2024-02-27 ENCOUNTER — APPOINTMENT (EMERGENCY)
Dept: CT IMAGING | Facility: HOSPITAL | Age: 59
DRG: 093 | End: 2024-02-27
Payer: MEDICARE

## 2024-02-27 ENCOUNTER — APPOINTMENT (EMERGENCY)
Dept: RADIOLOGY | Facility: HOSPITAL | Age: 59
DRG: 093 | End: 2024-02-27
Payer: MEDICARE

## 2024-02-27 ENCOUNTER — HOSPITAL ENCOUNTER (INPATIENT)
Facility: HOSPITAL | Age: 59
LOS: 2 days | Discharge: ANOTHER HOSPITAL | DRG: 093 | End: 2024-03-01
Attending: STUDENT IN AN ORGANIZED HEALTH CARE EDUCATION/TRAINING PROGRAM | Admitting: STUDENT IN AN ORGANIZED HEALTH CARE EDUCATION/TRAINING PROGRAM
Payer: MEDICARE

## 2024-02-27 DIAGNOSIS — R45.1 AGITATION: Primary | ICD-10-CM

## 2024-02-27 DIAGNOSIS — S72.114A NONDISPLACED FRACTURE OF GREATER TROCHANTER OF RIGHT FEMUR, INITIAL ENCOUNTER FOR CLOSED FRACTURE (HCC): ICD-10-CM

## 2024-02-27 DIAGNOSIS — F69 BEHAVIOR CONCERN IN ADULT: ICD-10-CM

## 2024-02-27 DIAGNOSIS — S72.001A CLOSED RIGHT HIP FRACTURE (HCC): ICD-10-CM

## 2024-02-27 PROBLEM — S72.114D: Status: ACTIVE | Noted: 2024-02-04

## 2024-02-27 LAB
ALBUMIN SERPL BCP-MCNC: 4.1 G/DL (ref 3.5–5)
ALP SERPL-CCNC: 66 U/L (ref 34–104)
ALT SERPL W P-5'-P-CCNC: 17 U/L (ref 7–52)
AMPHETAMINES SERPL QL SCN: NEGATIVE
ANION GAP SERPL CALCULATED.3IONS-SCNC: 7 MMOL/L
AST SERPL W P-5'-P-CCNC: 17 U/L (ref 13–39)
BARBITURATES UR QL: NEGATIVE
BASOPHILS # BLD AUTO: 0.05 THOUSANDS/ÂΜL (ref 0–0.1)
BASOPHILS NFR BLD AUTO: 1 % (ref 0–1)
BENZODIAZ UR QL: NEGATIVE
BILIRUB SERPL-MCNC: 0.22 MG/DL (ref 0.2–1)
BUN SERPL-MCNC: 24 MG/DL (ref 5–25)
CALCIUM SERPL-MCNC: 9.3 MG/DL (ref 8.4–10.2)
CARDIAC TROPONIN I PNL SERPL HS: <2 NG/L
CHLORIDE SERPL-SCNC: 104 MMOL/L (ref 96–108)
CO2 SERPL-SCNC: 28 MMOL/L (ref 21–32)
COCAINE UR QL: NEGATIVE
CREAT SERPL-MCNC: 0.61 MG/DL (ref 0.6–1.3)
EOSINOPHIL # BLD AUTO: 0.41 THOUSAND/ÂΜL (ref 0–0.61)
EOSINOPHIL NFR BLD AUTO: 5 % (ref 0–6)
ERYTHROCYTE [DISTWIDTH] IN BLOOD BY AUTOMATED COUNT: 13.5 % (ref 11.6–15.1)
GFR SERPL CREATININE-BSD FRML MDRD: 111 ML/MIN/1.73SQ M
GLUCOSE SERPL-MCNC: 83 MG/DL (ref 65–140)
HCT VFR BLD AUTO: 36.6 % (ref 36.5–49.3)
HGB BLD-MCNC: 12.6 G/DL (ref 12–17)
IMM GRANULOCYTES # BLD AUTO: 0.04 THOUSAND/UL (ref 0–0.2)
IMM GRANULOCYTES NFR BLD AUTO: 1 % (ref 0–2)
LYMPHOCYTES # BLD AUTO: 2.79 THOUSANDS/ÂΜL (ref 0.6–4.47)
LYMPHOCYTES NFR BLD AUTO: 35 % (ref 14–44)
MCH RBC QN AUTO: 32.6 PG (ref 26.8–34.3)
MCHC RBC AUTO-ENTMCNC: 34.4 G/DL (ref 31.4–37.4)
MCV RBC AUTO: 95 FL (ref 82–98)
METHADONE UR QL: NEGATIVE
MONOCYTES # BLD AUTO: 0.96 THOUSAND/ÂΜL (ref 0.17–1.22)
MONOCYTES NFR BLD AUTO: 12 % (ref 4–12)
NEUTROPHILS # BLD AUTO: 3.7 THOUSANDS/ÂΜL (ref 1.85–7.62)
NEUTS SEG NFR BLD AUTO: 46 % (ref 43–75)
NRBC BLD AUTO-RTO: 0 /100 WBCS
OPIATES UR QL SCN: NEGATIVE
OXYCODONE+OXYMORPHONE UR QL SCN: NEGATIVE
PCP UR QL: NEGATIVE
PLATELET # BLD AUTO: 184 THOUSANDS/UL (ref 149–390)
PMV BLD AUTO: 10.8 FL (ref 8.9–12.7)
POTASSIUM SERPL-SCNC: 4.2 MMOL/L (ref 3.5–5.3)
PROT SERPL-MCNC: 7.1 G/DL (ref 6.4–8.4)
RBC # BLD AUTO: 3.86 MILLION/UL (ref 3.88–5.62)
SODIUM SERPL-SCNC: 139 MMOL/L (ref 135–147)
THC UR QL: NEGATIVE
WBC # BLD AUTO: 7.95 THOUSAND/UL (ref 4.31–10.16)

## 2024-02-27 PROCEDURE — 70450 CT HEAD/BRAIN W/O DYE: CPT

## 2024-02-27 PROCEDURE — 96372 THER/PROPH/DIAG INJ SC/IM: CPT

## 2024-02-27 PROCEDURE — 84443 ASSAY THYROID STIM HORMONE: CPT | Performed by: FAMILY MEDICINE

## 2024-02-27 PROCEDURE — 80307 DRUG TEST PRSMV CHEM ANLYZR: CPT | Performed by: PHYSICIAN ASSISTANT

## 2024-02-27 PROCEDURE — 96374 THER/PROPH/DIAG INJ IV PUSH: CPT

## 2024-02-27 PROCEDURE — 80053 COMPREHEN METABOLIC PANEL: CPT | Performed by: PHYSICIAN ASSISTANT

## 2024-02-27 PROCEDURE — 36415 COLL VENOUS BLD VENIPUNCTURE: CPT | Performed by: PHYSICIAN ASSISTANT

## 2024-02-27 PROCEDURE — 73502 X-RAY EXAM HIP UNI 2-3 VIEWS: CPT

## 2024-02-27 PROCEDURE — 93005 ELECTROCARDIOGRAM TRACING: CPT

## 2024-02-27 PROCEDURE — 99222 1ST HOSP IP/OBS MODERATE 55: CPT | Performed by: FAMILY MEDICINE

## 2024-02-27 PROCEDURE — 85025 COMPLETE CBC W/AUTO DIFF WBC: CPT | Performed by: PHYSICIAN ASSISTANT

## 2024-02-27 PROCEDURE — 84484 ASSAY OF TROPONIN QUANT: CPT | Performed by: PHYSICIAN ASSISTANT

## 2024-02-27 PROCEDURE — 99285 EMERGENCY DEPT VISIT HI MDM: CPT

## 2024-02-27 PROCEDURE — 99285 EMERGENCY DEPT VISIT HI MDM: CPT | Performed by: PHYSICIAN ASSISTANT

## 2024-02-27 RX ORDER — OLANZAPINE 10 MG/2ML
5 INJECTION, POWDER, FOR SOLUTION INTRAMUSCULAR
Status: DISCONTINUED | OUTPATIENT
Start: 2024-02-27 | End: 2024-03-01 | Stop reason: HOSPADM

## 2024-02-27 RX ORDER — DOCUSATE SODIUM 100 MG/1
100 CAPSULE, LIQUID FILLED ORAL 2 TIMES DAILY PRN
Status: DISCONTINUED | OUTPATIENT
Start: 2024-02-27 | End: 2024-03-01 | Stop reason: HOSPADM

## 2024-02-27 RX ORDER — HALOPERIDOL 5 MG/ML
5 INJECTION INTRAMUSCULAR ONCE
Status: COMPLETED | OUTPATIENT
Start: 2024-02-27 | End: 2024-02-27

## 2024-02-27 RX ORDER — LISINOPRIL 10 MG/1
10 TABLET ORAL DAILY
Status: DISCONTINUED | OUTPATIENT
Start: 2024-02-28 | End: 2024-03-01 | Stop reason: HOSPADM

## 2024-02-27 RX ORDER — LEVOTHYROXINE SODIUM 0.15 MG/1
150 TABLET ORAL DAILY
Status: DISCONTINUED | OUTPATIENT
Start: 2024-02-28 | End: 2024-03-01 | Stop reason: HOSPADM

## 2024-02-27 RX ORDER — ACETAMINOPHEN 325 MG/1
650 TABLET ORAL EVERY 6 HOURS PRN
Status: DISCONTINUED | OUTPATIENT
Start: 2024-02-27 | End: 2024-03-01 | Stop reason: HOSPADM

## 2024-02-27 RX ORDER — QUETIAPINE 200 MG/1
400 TABLET, FILM COATED, EXTENDED RELEASE ORAL
Status: DISCONTINUED | OUTPATIENT
Start: 2024-02-27 | End: 2024-03-01 | Stop reason: HOSPADM

## 2024-02-27 RX ORDER — DIVALPROEX SODIUM 500 MG/1
1000 TABLET, DELAYED RELEASE ORAL
Status: DISCONTINUED | OUTPATIENT
Start: 2024-02-28 | End: 2024-03-01 | Stop reason: HOSPADM

## 2024-02-27 RX ORDER — LEVETIRACETAM 500 MG/1
1500 TABLET ORAL EVERY 12 HOURS SCHEDULED
Status: DISCONTINUED | OUTPATIENT
Start: 2024-02-27 | End: 2024-03-01 | Stop reason: HOSPADM

## 2024-02-27 RX ORDER — GABAPENTIN 400 MG/1
400 CAPSULE ORAL 3 TIMES DAILY
Status: DISCONTINUED | OUTPATIENT
Start: 2024-02-27 | End: 2024-03-01 | Stop reason: HOSPADM

## 2024-02-27 RX ORDER — LACOSAMIDE 100 MG/1
200 TABLET ORAL
Status: DISCONTINUED | OUTPATIENT
Start: 2024-02-27 | End: 2024-03-01 | Stop reason: HOSPADM

## 2024-02-27 RX ORDER — ENOXAPARIN SODIUM 100 MG/ML
40 INJECTION SUBCUTANEOUS DAILY
Status: DISCONTINUED | OUTPATIENT
Start: 2024-02-28 | End: 2024-03-01 | Stop reason: HOSPADM

## 2024-02-27 RX ORDER — INSULIN LISPRO 100 [IU]/ML
1-5 INJECTION, SOLUTION INTRAVENOUS; SUBCUTANEOUS
Status: DISCONTINUED | OUTPATIENT
Start: 2024-02-28 | End: 2024-03-01 | Stop reason: HOSPADM

## 2024-02-27 RX ORDER — PRAVASTATIN SODIUM 80 MG/1
80 TABLET ORAL
Status: DISCONTINUED | OUTPATIENT
Start: 2024-02-28 | End: 2024-03-01 | Stop reason: HOSPADM

## 2024-02-27 RX ORDER — IBUPROFEN 600 MG/1
600 TABLET ORAL EVERY 6 HOURS PRN
Status: DISCONTINUED | OUTPATIENT
Start: 2024-02-27 | End: 2024-03-01 | Stop reason: HOSPADM

## 2024-02-27 RX ORDER — LORAZEPAM 2 MG/ML
1 INJECTION INTRAMUSCULAR ONCE
Status: COMPLETED | OUTPATIENT
Start: 2024-02-27 | End: 2024-02-27

## 2024-02-27 RX ORDER — DIVALPROEX SODIUM 250 MG/1
750 TABLET, DELAYED RELEASE ORAL DAILY
Status: DISCONTINUED | OUTPATIENT
Start: 2024-02-28 | End: 2024-03-01 | Stop reason: HOSPADM

## 2024-02-27 RX ORDER — LORATADINE 10 MG/1
10 TABLET ORAL DAILY
Status: DISCONTINUED | OUTPATIENT
Start: 2024-02-28 | End: 2024-03-01 | Stop reason: HOSPADM

## 2024-02-27 RX ORDER — ZONISAMIDE 100 MG/1
300 CAPSULE ORAL
Status: DISCONTINUED | OUTPATIENT
Start: 2024-02-27 | End: 2024-03-01 | Stop reason: HOSPADM

## 2024-02-27 RX ORDER — TEMAZEPAM 15 MG/1
15 CAPSULE ORAL
Status: DISCONTINUED | OUTPATIENT
Start: 2024-02-27 | End: 2024-03-01 | Stop reason: HOSPADM

## 2024-02-27 RX ORDER — QUETIAPINE 200 MG/1
200 TABLET, FILM COATED, EXTENDED RELEASE ORAL 2 TIMES DAILY
Status: DISCONTINUED | OUTPATIENT
Start: 2024-02-28 | End: 2024-03-01 | Stop reason: HOSPADM

## 2024-02-27 RX ADMIN — HALOPERIDOL LACTATE 5 MG: 5 INJECTION, SOLUTION INTRAMUSCULAR at 21:21

## 2024-02-27 RX ADMIN — LORAZEPAM 1 MG: 2 INJECTION INTRAMUSCULAR; INTRAVENOUS at 21:18

## 2024-02-28 LAB
ANION GAP SERPL CALCULATED.3IONS-SCNC: 7 MMOL/L
ATRIAL RATE: 84 BPM
BUN SERPL-MCNC: 23 MG/DL (ref 5–25)
CALCIUM SERPL-MCNC: 9.1 MG/DL (ref 8.4–10.2)
CHLORIDE SERPL-SCNC: 106 MMOL/L (ref 96–108)
CO2 SERPL-SCNC: 25 MMOL/L (ref 21–32)
CREAT SERPL-MCNC: 0.61 MG/DL (ref 0.6–1.3)
ERYTHROCYTE [DISTWIDTH] IN BLOOD BY AUTOMATED COUNT: 13.5 % (ref 11.6–15.1)
GFR SERPL CREATININE-BSD FRML MDRD: 111 ML/MIN/1.73SQ M
GLUCOSE SERPL-MCNC: 102 MG/DL (ref 65–140)
GLUCOSE SERPL-MCNC: 156 MG/DL (ref 65–140)
GLUCOSE SERPL-MCNC: 186 MG/DL (ref 65–140)
GLUCOSE SERPL-MCNC: 83 MG/DL (ref 65–140)
HCT VFR BLD AUTO: 37.8 % (ref 36.5–49.3)
HGB BLD-MCNC: 12.9 G/DL (ref 12–17)
MAGNESIUM SERPL-MCNC: 1.6 MG/DL (ref 1.9–2.7)
MCH RBC QN AUTO: 32.5 PG (ref 26.8–34.3)
MCHC RBC AUTO-ENTMCNC: 34.1 G/DL (ref 31.4–37.4)
MCV RBC AUTO: 95 FL (ref 82–98)
P AXIS: 55 DEGREES
PLATELET # BLD AUTO: 165 THOUSANDS/UL (ref 149–390)
PMV BLD AUTO: 11.4 FL (ref 8.9–12.7)
POTASSIUM SERPL-SCNC: 4.3 MMOL/L (ref 3.5–5.3)
PR INTERVAL: 176 MS
QRS AXIS: -13 DEGREES
QRSD INTERVAL: 160 MS
QT INTERVAL: 408 MS
QTC INTERVAL: 482 MS
RBC # BLD AUTO: 3.97 MILLION/UL (ref 3.88–5.62)
SODIUM SERPL-SCNC: 138 MMOL/L (ref 135–147)
T WAVE AXIS: 42 DEGREES
TSH SERPL DL<=0.05 MIU/L-ACNC: 5.53 UIU/ML (ref 0.45–4.5)
VENTRICULAR RATE: 84 BPM
WBC # BLD AUTO: 9.85 THOUSAND/UL (ref 4.31–10.16)

## 2024-02-28 PROCEDURE — 85027 COMPLETE CBC AUTOMATED: CPT | Performed by: FAMILY MEDICINE

## 2024-02-28 PROCEDURE — 80048 BASIC METABOLIC PNL TOTAL CA: CPT | Performed by: FAMILY MEDICINE

## 2024-02-28 PROCEDURE — 82948 REAGENT STRIP/BLOOD GLUCOSE: CPT

## 2024-02-28 PROCEDURE — 99232 SBSQ HOSP IP/OBS MODERATE 35: CPT | Performed by: INTERNAL MEDICINE

## 2024-02-28 PROCEDURE — 93010 ELECTROCARDIOGRAM REPORT: CPT | Performed by: INTERNAL MEDICINE

## 2024-02-28 PROCEDURE — 97167 OT EVAL HIGH COMPLEX 60 MIN: CPT

## 2024-02-28 PROCEDURE — 36415 COLL VENOUS BLD VENIPUNCTURE: CPT | Performed by: FAMILY MEDICINE

## 2024-02-28 PROCEDURE — 83735 ASSAY OF MAGNESIUM: CPT | Performed by: FAMILY MEDICINE

## 2024-02-28 PROCEDURE — 87081 CULTURE SCREEN ONLY: CPT | Performed by: INTERNAL MEDICINE

## 2024-02-28 PROCEDURE — 97163 PT EVAL HIGH COMPLEX 45 MIN: CPT

## 2024-02-28 RX ORDER — MAGNESIUM SULFATE HEPTAHYDRATE 40 MG/ML
4 INJECTION, SOLUTION INTRAVENOUS ONCE
Status: COMPLETED | OUTPATIENT
Start: 2024-02-28 | End: 2024-02-28

## 2024-02-28 RX ADMIN — LACOSAMIDE 200 MG: 100 TABLET, FILM COATED ORAL at 00:52

## 2024-02-28 RX ADMIN — QUETIAPINE FUMARATE 400 MG: 200 TABLET, EXTENDED RELEASE ORAL at 21:21

## 2024-02-28 RX ADMIN — QUETIAPINE FUMARATE 400 MG: 200 TABLET, EXTENDED RELEASE ORAL at 00:54

## 2024-02-28 RX ADMIN — QUETIAPINE FUMARATE 200 MG: 50 TABLET, EXTENDED RELEASE ORAL at 09:00

## 2024-02-28 RX ADMIN — IBUPROFEN 600 MG: 600 TABLET, FILM COATED ORAL at 17:10

## 2024-02-28 RX ADMIN — ZONISAMIDE 300 MG: 100 CAPSULE ORAL at 21:21

## 2024-02-28 RX ADMIN — B-COMPLEX W/ C & FOLIC ACID TAB 1 TABLET: TAB at 09:03

## 2024-02-28 RX ADMIN — GABAPENTIN 400 MG: 400 CAPSULE ORAL at 21:23

## 2024-02-28 RX ADMIN — PRAVASTATIN SODIUM 80 MG: 80 TABLET ORAL at 17:09

## 2024-02-28 RX ADMIN — LISINOPRIL 10 MG: 10 TABLET ORAL at 09:01

## 2024-02-28 RX ADMIN — INSULIN LISPRO 1 UNITS: 100 INJECTION, SOLUTION INTRAVENOUS; SUBCUTANEOUS at 11:37

## 2024-02-28 RX ADMIN — LEVETIRACETAM 1500 MG: 500 TABLET, FILM COATED ORAL at 00:51

## 2024-02-28 RX ADMIN — ENOXAPARIN SODIUM 40 MG: 40 INJECTION SUBCUTANEOUS at 09:00

## 2024-02-28 RX ADMIN — LEVOTHYROXINE SODIUM 150 MCG: 150 TABLET ORAL at 09:01

## 2024-02-28 RX ADMIN — MAGNESIUM SULFATE HEPTAHYDRATE 4 G: 40 INJECTION, SOLUTION INTRAVENOUS at 11:49

## 2024-02-28 RX ADMIN — LEVETIRACETAM 1500 MG: 500 TABLET, FILM COATED ORAL at 21:22

## 2024-02-28 RX ADMIN — LORATADINE 10 MG: 10 TABLET ORAL at 09:02

## 2024-02-28 RX ADMIN — GABAPENTIN 400 MG: 400 CAPSULE ORAL at 17:10

## 2024-02-28 RX ADMIN — GABAPENTIN 400 MG: 400 CAPSULE ORAL at 09:01

## 2024-02-28 RX ADMIN — GABAPENTIN 400 MG: 400 CAPSULE ORAL at 00:52

## 2024-02-28 RX ADMIN — LACOSAMIDE 200 MG: 100 TABLET, FILM COATED ORAL at 21:22

## 2024-02-28 RX ADMIN — LEVETIRACETAM 1500 MG: 500 TABLET, FILM COATED ORAL at 09:08

## 2024-02-28 RX ADMIN — DIVALPROEX SODIUM 750 MG: 250 TABLET, DELAYED RELEASE ORAL at 09:08

## 2024-02-28 RX ADMIN — LACOSAMIDE 150 MG: 100 TABLET, FILM COATED ORAL at 09:02

## 2024-02-28 NOTE — ASSESSMENT & PLAN NOTE
- Unprovoked episode of agitation this evening at [facility] nursing [facility]  - Engage psychiatry  - Medical record reviewed, medication regimen optimized upon discharge from this institution on February second 2024  - For time being, continue discharge regimen as outlined which includes hydroxyzine, Seroquel, Restoril

## 2024-02-28 NOTE — PLAN OF CARE
Problem: Prexisting or High Potential for Compromised Skin Integrity  Goal: Skin integrity is maintained or improved  Description: INTERVENTIONS:  - Identify patients at risk for skin breakdown  - Assess and monitor skin integrity  - Assess and monitor nutrition and hydration status  - Monitor labs   - Assess for incontinence   - Turn and reposition patient  - Assist with mobility/ambulation  - Relieve pressure over bony prominences  - Avoid friction and shearing  - Provide appropriate hygiene as needed including keeping skin clean and dry  - Evaluate need for skin moisturizer/barrier cream  - Collaborate with interdisciplinary team   - Patient/family teaching  - Consider wound care consult   Outcome: Progressing     Problem: PAIN - ADULT  Goal: Verbalizes/displays adequate comfort level or baseline comfort level  Description: Interventions:  - Encourage patient to monitor pain and request assistance  - Assess pain using appropriate pain scale  - Administer analgesics based on type and severity of pain and evaluate response  - Implement non-pharmacological measures as appropriate and evaluate response  - Consider cultural and social influences on pain and pain management  - Notify physician/advanced practitioner if interventions unsuccessful or patient reports new pain  Outcome: Progressing     Problem: INFECTION - ADULT  Goal: Absence or prevention of progression during hospitalization  Description: INTERVENTIONS:  - Assess and monitor for signs and symptoms of infection  - Monitor lab/diagnostic results  - Monitor all insertion sites, i.e. indwelling lines, tubes, and drains  - Monitor endotracheal if appropriate and nasal secretions for changes in amount and color  - [place] appropriate cooling/warming therapies per order  - Administer medications as ordered  - Instruct and encourage patient and family to use good hand hygiene technique  - Identify and instruct in appropriate isolation precautions for  identified infection/condition  Outcome: Progressing     Problem: SAFETY ADULT  Goal: Patient will remain free of falls  Description: INTERVENTIONS:  - Educate patient/family on patient safety including physical limitations  - Instruct patient to call for assistance with activity   - Consult OT/PT to assist with strengthening/mobility   - Keep Call bell within reach  - Keep bed low and locked with side rails adjusted as appropriate  - Keep care items and personal belongings within reach  - Initiate and maintain comfort rounds  - Make Fall Risk Sign visible to staff  - Offer Toileting every 2 Hours, in advance of need  - Initiate/Maintain bed alarm  - Obtain necessary fall risk management equipment  - Apply yellow socks and bracelet for high fall risk patients  - Consider moving patient to room near nurses station  Outcome: Progressing  Goal: Maintain or return to baseline ADL function  Description: INTERVENTIONS:  -  Assess patient's ability to carry out ADLs; assess patient's baseline for ADL function and identify physical deficits which impact ability to perform ADLs (bathing, care of mouth/teeth, toileting, grooming, dressing, etc.)  - Assess/evaluate cause of self-care deficits   - Assess range of motion  - Assess patient's mobility; develop plan if impaired  - Assess patient's need for assistive devices and provide as appropriate  - Encourage maximum independence but intervene and supervise when necessary  - Involve family in performance of ADLs  - Assess for home care needs following discharge   - Consider OT consult to assist with ADL evaluation and planning for discharge  - Provide patient education as appropriate  Outcome: Progressing  Goal: Maintains/Returns to pre admission functional level  Description: INTERVENTIONS:  - Perform AM-PAC 6 Click Basic Mobility/ Daily Activity assessment daily.  - Set and communicate daily mobility goal to care team and patient/family/caregiver.   - Collaborate with  rehabilitation services on mobility goals if consulted  - Perform Range of Motion 3 times a day.  - Reposition patient every 2 hours.  - Dangle patient 3 times a day  - Stand patient 3 times a day  - Ambulate patient 3 times a day  - Out of bed to chair 3 times a day   - Out of bed for meals 3 times a day  - Out of bed for toileting  - Record patient progress and toleration of activity level   Outcome: Progressing     Problem: DISCHARGE PLANNING  Goal: Discharge to home or other facility with appropriate resources  Description: INTERVENTIONS:  - Identify barriers to discharge w/patient and caregiver  - Arrange for needed discharge resources and transportation as appropriate  - Identify discharge learning needs (meds, wound care, etc.)  - Arrange for interpretive services to assist at discharge as needed  - Refer to Case Management Department for coordinating discharge planning if the patient needs post-hospital services based on physician/advanced practitioner order or complex needs related to functional status, cognitive ability, or social support system  Outcome: Progressing     Problem: Knowledge Deficit  Goal: Patient/family/caregiver demonstrates understanding of disease process, treatment plan, medications, and discharge instructions  Description: Complete learning assessment and assess knowledge base.  Interventions:  - Provide teaching at level of understanding  - Provide teaching via preferred learning methods  Outcome: Progressing

## 2024-02-28 NOTE — ASSESSMENT & PLAN NOTE
Unprovoked episode of agitation this evening at skilled nursing facility  Currently on 1:1 with posey belt in place  Resume previous regimen: Seroquel 200mg BID and 400mg QHS, Gabapentin 400mg TID, Zonisemide 300mg HS  Consult to Psychiatry, recommendations are appreciated

## 2024-02-28 NOTE — PHYSICAL THERAPY NOTE
Physical Therapy Evaluation     Patient's Name: Jairon Oconnell    Admitting Diagnosis  Known medical problems [Z78.9]    Problem List  Patient Active Problem List   Diagnosis    Cataract    Cerebral palsy (HCC)    Type 2 diabetes mellitus with diabetic neuropathy, without long-term current use of insulin (HCC)    Generalized convulsive epilepsy ([facility])    Hyperlipidemia    Essential hypertension    Acquired hypothyroidism    Osteoporosis    Scoliosis    Vitamin B12 deficiency    Vitamin D deficiency    Seborrheic dermatitis of scalp    Nearsightedness    Behavior concern in adult    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HCC)    Metabolic encephalopathy    Hyponatremia    Thrombocytopathia ([facility])    Hypomagnesemia    Social problem    Ambulatory dysfunction    Pressure ulcer of sacral region, stage 3 ([facility])    Closed nondisplaced fracture of proximal phalanx of left index finger with routine healing, subsequent encounter    Nondisplaced fracture of greater trochanter of right femur, subsequent encounter for closed fracture with routine healing     Past Medical History  Past Medical History:   Diagnosis Date    ADRIANA (acute kidney injury) ([facility]) 9/24/2019    Bacteremia due to Gram-positive bacteria 9/7/2021    Buttock wound, left, subsequent encounter 11/5/2021    COVID-19     CP (cerebral palsy) ([facility])     Depression     Diabetes ([facility])     Disease of thyroid gland     Gait disorder     Gluteal abscess 9/6/2021    Hyperlipidemia     Hypertension     Kidney failure     Kidney stones     Moderate protein-calorie malnutrition ([facility]) 12/22/2021    Osteoporosis     Pressure injury of left buttock, stage 4 ([facility]) 3/9/2022    Psychiatric disorder     Scoliosis     Seizures ([facility])     Sepsis ([facility]) 9/25/2019    Thyroid disease     UTI (urinary tract infection)      Past Surgical History  Past Surgical History:   Procedure Laterality Date    APPENDECTOMY      IR PICC PLACEMENT SINGLE LUMEN  9/13/2021    IR PICC PLACEMENT SINGLE  LUMEN  9/16/2021 02/28/24 0922   PT Last Visit   PT Visit Date 02/28/24   Note Type   Note type Evaluation   Pain Assessment   Pain Assessment Tool FLACC   Pain Location/Orientation Orientation: Right;Location: Hip   Pain Onset/Description Frequency: Intermittent   Hospital Pain Intervention(s) Repositioned;Ambulation/increased activity  (RN made aware)   Pain Rating: FLACC (Rest) - Face 0   Pain Rating: FLACC (Rest) - Legs 0   Pain Rating: FLACC (Rest) - Activity 0   Pain Rating: FLACC (Rest) - Cry 0   Pain Rating: FLACC (Rest) - Consolability 0   Score: FLACC (Rest) 0   Pain Rating: FLACC (Activity) - Face 1   Pain Rating: FLACC (Activity) - Legs 1   Pain Rating: FLACC (Activity) - Activity 1   Pain Rating: FLACC (Activity) - Cry 1   Pain Rating: FLACC (Activity) - Consolability 1   Score: FLACC (Activity) 5   Restrictions/Precautions   Weight Bearing Precautions Per Order Yes   RLE Weight Bearing Per Order (S)  WBAT  (no active hip abduction)   Other Precautions 1:1;Cognitive;Chair Alarm;Bed Alarm;Restraints;Fall Risk;Pain;Seizure   Home Living   Type of Home House   Home Layout Two level;Able to live on main level with bedroom/bathroom;Performs ADLs on one level  (No DHARMESH)   Bathroom Accessibility Accessible via wheelchair   Home Equipment Wheelchair-manual   Additional Comments Pt ambulates without an AD.   Prior Function   Level of [place] Needs assistance with ADLs;Needs assistance with functional mobility;Needs assistance with IADLS   Lives With   (pt resides with caregiver)   Receives Help From Family  (caregiver)   IADLs Family/Friend/Other provides transportation;Family/Friend/Other provides meals   Falls in the last 6 months 1 to 4  (+fall history per chart review)   Comments Pt is a poor historian. Information regarding home setup and PLOF obtained from chart reivew. Pt [facility].   General   Family/Caregiver Present No   Cognition   Overall Cognitive  Status Impaired   Arousal/Participation Alert   Orientation Level Oriented to person;Disoriented to place;Disoriented to time;Disoriented to situation   Memory Decreased recall of recent events;Decreased short term memory   Following Commands Follows one step commands with increased time or repetition   Comments Pt agreeable to PT with encouragement.   RLE Assessment   RLE Assessment X   Strength RLE   RLE Overall Strength 3/5  (grossly assessed)   LLE Assessment   LLE Assessment X   Strength LLE   LLE Overall Strength 3+/5  (grossly assessed)   Light Touch   RLE Light Touch Grossly intact   LLE Light Touch Grossly intact   Bed Mobility   Supine to Sit 3  Moderate assistance   Additional items Assist x 2;HOB elevated;Increased time required;Verbal cues;LE management   Sit to Supine 3  Moderate assistance   Additional items Assist x 2;Increased time required;Verbal cues;LE management   Transfers   Sit to Stand 2  Maximal assistance   Additional items Assist x 2;Increased time required;Verbal cues   Stand to Sit 2  Maximal assistance   Additional items Assist x 2;Increased time required;Verbal cues   Additional Comments Pt performed sit to stand with max assist of 2 and hand held assist; pt refusing walker.   Ambulation/Elevation   Ambulation/Elevation Additional Comments Pt unable to shift weight appropriately at this time   Balance   Static Sitting Fair -   Dynamic Sitting Poor +   Static Standing Poor -   Endurance Deficit   Endurance Deficit Yes   Endurance Deficit Description decreased activity tolerance   Activity Tolerance   Activity Tolerance Patient limited by fatigue;Patient limited by pain   Medical Staff Made Aware OT Yanira  (Co-evaluation performed with OT secondary to complex medical condition of patient and regression of functional status from baseline. PT/OT goals were addressed separately.)   Nurse Made Aware RN aware   Assessment   Prognosis Fair   Problem List Decreased strength;Decreased  endurance;Impaired balance;Decreased mobility;Decreased cognition;Orthopedic restrictions;Pain   Assessment Pt is 58 year old male seen for PT evaluation s/p admit to [facility] on 2/27/2024 with Ambulatory dysfunction. PT consulted to assess pt's functional mobility and discharge needs. Order placed for PT evaluation and treatment. Comorbidities affecting pt's physical performance at time of assessment include cerebral palsy, type 2 DM, generalized convulsive epilepsy, hyperlipidemia, essential hypertension, acquired hypothyroidism, behavior concern in adult, social problem, and non-displaced fracture of greater trochanter of right femur. Prior to hospitalization, pt was at [facility] for rehab. Previously pt was required assist for mobility and ambulated without an AD. Pt resides with his caregiver in a two level house, but is able to stay on the first floor, with no steps to enter. Personal factors affecting pt at time of initial evaluation include inability to ambulate household distances, inability to navigate level surfaces without external assistance, unable to perform dynamic tasks in the community, limited home support, positive fall history, difficulty performing ADLs, and inability to perform IADLs. Please find objective findings from PT assessment regarding body systems outlined above with impairments and limitations including weakness, impaired balance, decreased endurance, pain, decreased activity tolerance, decreased functional mobility tolerance, fall risk, orthopedic restrictions, and decreased cognition. The following objective measures were performed on initial evaluation Barthel Index: 30/100, Modified [place]: 4 (moderate/severe disability), and AM-PAC 6-Clicks: 6/24. Pt's clinical presentation is currently unstable/unpredictable seen in pt's presentation of need for ongoing medical management/monitoring, pt is a fall risk, and pt requires cues and assist of two for safety with  functional mobility. Pt to benefit from continued PT treatment to address deficits as defined above and maximize pt's level of function and independence with mobility. From a PT standpoint, recommendation at time of discharge would be level 2, moderate resource intensity in order to facilitate return to prior level of function.   Barriers to Discharge Decreased caregiver support;Other (Comment)  (decline in functional mobility)   Goals   STG Expiration Date 03/09/24   Short Term Goal #1 In 10 days: Increase bilateral LE strength 1/2 grade to facilitate independent mobility, Perform all bed mobility tasks with mod A of 1 to decrease caregiver burden, Perform all transfers with mod A of 1 to improve independence, Increase all balance 1/2 grade to decrease risk for falls, and PT to see and establish goals for gait when appropriate   Plan   Treatment/Interventions Functional transfer training;LE strengthening/ROM;Therapeutic exercise;Endurance training;Cognitive reorientation;Patient/family training;Bed mobility;Continued evaluation;Spoke to nursing;OT   PT Frequency 3-5x/wk   Discharge Recommendation   Rehab Resource Intensity Level, PT II (Moderate Resource Intensity)   AM-PAC Basic Mobility Inpatient   Turning in Flat Bed Without Bedrails 1   Lying on Back to Sitting on Edge of Flat Bed Without Bedrails 1   Moving Bed to Chair 1   Standing Up From Chair Using Arms 1   Walk in Room 1   Climb 3-5 Stairs With Railing 1   Basic Mobility Inpatient Raw Score 6   Turning Head Towards Sound 3   Follow Simple Instructions 2   Low Function Basic Mobility Raw Score  11   Low Function Basic Mobility Standardized Score  16.55   Highest Level Of Mobility   JH-HLM Goal 2: Bed activities/Dependent transfer   JH-HLM Achieved 3: Sit at edge of bed   Modified [place] Scale   Modified [place] Scale 4   Barthel Index   Feeding 5   Bathing 0   Grooming Score 0   Dressing Score 5   Bladder Score 5   Bowels Score 5   Toilet Use Score 5    Transfers (Bed/Chair) Score 5   Mobility (Level Surface) Score 0   Stairs Score 0   Barthel Index Score 30     PT Evaluation Time: 0910-0922  Renee Arias, PT, DPT

## 2024-02-28 NOTE — CASE MANAGEMENT
Case Management Discharge Planning Note    Patient name Jairon Oconnell  Location [facility]  MRN [id number]  : 1965 Date 2024       Current Admission Date: 2024  Current Admission Diagnosis:Ambulatory dysfunction   Patient Active Problem List    Diagnosis Date Noted    Nondisplaced fracture of greater trochanter of right femur, subsequent encounter for closed fracture with routine healing 2024    Closed nondisplaced fracture of proximal phalanx of left index finger with routine healing, subsequent encounter 10/30/2023    Pressure ulcer of sacral region, stage 3 (HCC) 2023    Ambulatory dysfunction 2022    Social problem 2021    Hypomagnesemia 2021    Thrombocytopathia (HCC) 2020    Hyponatremia 2019    Metabolic encephalopathy 2019    Seborrheic dermatitis of scalp 2019    Nearsightedness 2019    Behavior concern in adult 2019    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HCC) 2019    Vitamin B12 deficiency 2017    Hyperlipidemia 2016    Vitamin D deficiency 2016    Type 2 diabetes mellitus with diabetic neuropathy, without long-term current use of insulin ([facility]) 06/15/2016    Acquired hypothyroidism 06/15/2016    Cataract 2013    Cerebral palsy ([facility]) 2013    Generalized convulsive epilepsy ([facility]) 2013    Essential hypertension 2013    Osteoporosis 2013    Scoliosis 2013      LOS (days): 0  Geometric Mean LOS (GMLOS) (days):   Days to GMLOS:     OBJECTIVE:            Current admission status: Observation   Preferred Pharmacy:   [facility] - [address]  [address] [address]  Phone: [phone] Fax: [phone]    Primary Care Provider: HUNTER Brown    Primary Insurance: MEDICARE  Secondary Insurance: [place] [employer]    DISCHARGE DETAILS:    Additional Comments: Per chart review, pt was admitted from  [facility]. CM sent referral requesting confirmation if pt was with them for STR or LTC, awaiting response. CM called and left a VM with pt's sister, awaiting return call. CM department to follow.

## 2024-02-28 NOTE — ED PROVIDER NOTES
History  Chief Complaint   Patient presents with    Medical Problem     Send from [facility] for being aggressive toward staff, throwing unknown cause for outburst. Patient calm and cooperative on arrival. Patient at rehab for right hip fracture stating pain 4/10 no other complaints at this time        57 yo sent in for aggressive behavior at short term rehab. He was admitted to [facility] [place] earlier this month for hip fracture which is being managed non-op. According to patient's  his prn anxiety/agitation medications were stopped in order for him to be accepted at the facility. He does occasionally have aggressive behavior but according to  they are usually very minor outbursts.       History provided by:  Patient   used: No        Prior to Admission Medications   Prescriptions Last Dose Informant Patient Reported? Taking?   Blood Glucose Monitoring Suppl (ONETOUCH VERIO) w/Device KIT  Care Giver No No   Sig: by Does not apply route 3 (three) times a day TEST BLOOD SUGARS THREE TIMES   Patient not taking: Reported on 7/7/2023   Depakote 500 MG DR tablet  Care Giver No No   Sig: Take 1 tab in the morning and 2 tabs at night. Brand necessary   Depakote  MG 24 hr tablet  Care Giver No No   Sig: Take 1 tablet (250 mg total) by mouth daily Take in the morning with one 500 mg tablet (total morning dose of 750 mg). BRAND ONLY   Disposable Gloves (Safe-Sense Glove-Blk-Nitrl-L) MISC  Care Giver No No   Sig: Use 1 each 3 (three) times a day as needed (care taker assisting with soiled briefs)   Patient not taking: Reported on 9/28/2023   Incontinence Supply Disposable (Briefs Overnight Medium) MISC  Care Giver No No   Sig: Use in the morning   Patient not taking: Reported on 9/28/2023   Multiple Vitamin (Daily-Reina) TABS  Care Giver No No   Sig: Take 1 tablet by mouth daily Take at 8 AM   OneTouch Delica Lancets 33G MISC  Care Giver No No   Sig: by Does not apply route daily TEST  BLOOD SUGARS THREE TIMES DAILY   Patient not taking: Reported on 1/24/2024   QUEtiapine (SEROquel XR) 200 mg 24 hr tablet  Care Giver No No   Sig: TAKE 1 TABLET BY MOUTH TWICE A DAY (8AM,2PM)   QUEtiapine (SEROquel XR) 400 mg 24 hr tablet  Care Giver No No   Sig: TAKE 1 TABLET BY MOUTH AT BEDTIME (8PM) (DX: ANTIPSYCHOTIC)   benztropine (COGENTIN) 0.5 mg tablet  Care Giver No No   Sig: Take 1 tablet (0.5 mg total) by mouth 2 (two) times a day   calcium carbonate (OYSTER SHELL,OSCAL) 500 mg  Care Giver No No   Sig: Take 1 tablet by mouth 3 (three) times a day   cholecalciferol (VITAMIN D3) 1,000 units tablet  Care Giver No No   Sig: Take 1 tablet (1,000 Units total) by mouth daily   clonazePAM (KlonoPIN) 0.25 MG disintegrating tablet   No No   Sig: Take 1 tablet (0.25 mg total) by mouth as needed for seizures (clusters of myoclonic jerking (more than 2 myoclonic jerks in a day)) for up to 7 doses   docusate sodium (COLACE) 100 mg capsule  Care Giver No No   Sig: Take 1 capsule (100 mg total) by mouth 2 (two) times a day as needed for constipation   gabapentin (NEURONTIN) 400 mg capsule   No No   Sig: Take 1 capsule (400 mg total) by mouth 3 (three) times a day   hydrOXYzine HCL (ATARAX) 10 mg tablet   No No   Sig: Take 1 tablet (10 mg total) by mouth 2 (two) times a day   ibuprofen (MOTRIN) 600 mg tablet  Care Giver Yes No   Sig: Take 600 mg by mouth every 6 (six) hours as needed for mild pain   lacosamide (VIMPAT) 150 mg tablet   No No   Sig: Take 1 tab (150 mg) in the morning.   lacosamide (VIMPAT) 200 mg tablet   No No   Sig: Take 1 tab (200 mg) at night.   levETIRAcetam (KEPPRA) 750 mg tablet  Care Giver No No   Sig: GIVE 2 TABLETS BY MOUTH TWICE DAILY (=1500MG) FOR SEIZURE DISORDER DR. LIVE JONES   levothyroxine 150 mcg tablet  Care Giver No No   Sig: Take 1 tablet (150 mcg total) by mouth daily   lisinopril (ZESTRIL) 10 mg tablet  Care Giver No No   Sig: Take 1 tablet (10 mg total) by mouth daily   loratadine  (CLARITIN) 10 mg tablet  Care Giver Yes No   Sig: Take 10 mg by mouth daily   metFORMIN (GLUCOPHAGE) 1000 MG tablet   No No   Sig: Take 1 tablet (1,000 mg total) by mouth 2 (two) times a day with meals   simvastatin (ZOCOR) 40 mg tablet   No No   Sig: Take 1 tablet (40 mg total) by mouth daily at bedtime   temazepam (RESTORIL) 15 mg capsule  Care Giver No No   Sig: Take 1 capsule (15 mg total) by mouth daily at bedtime as needed for sleep   vitamin B-12 (VITAMIN B-12) 1,000 mcg tablet  Care Giver No No   Sig: Take 1 tablet (1,000 mcg total) by mouth daily   Patient not taking: Reported on 1/5/2024   zonisamide (ZONEGRAN) 100 mg capsule  Care Giver No No   Sig: Take 4 capsules (400 mg) nightly.   Patient taking differently: No sig reported      Facility-Administered Medications: None       Past Medical History:   Diagnosis Date    ADRIANA (acute kidney injury) ([facility]) 9/24/2019    Bacteremia due to Gram-positive bacteria 9/7/2021    Buttock wound, left, subsequent encounter 11/5/2021    COVID-19     CP (cerebral palsy) ([facility])     Depression     Diabetes ([facility])     Disease of thyroid gland     Gait disorder     Gluteal abscess 9/6/2021    Hyperlipidemia     Hypertension     Kidney failure     Kidney stones     Moderate protein-calorie malnutrition ([facility]) 12/22/2021    Osteoporosis     Pressure injury of left buttock, stage 4 ([facility]) 3/9/2022    Psychiatric disorder     Scoliosis     Seizures ([facility])     Sepsis ([facility]) 9/25/2019    Thyroid disease     UTI (urinary tract infection)        Past Surgical History:   Procedure Laterality Date    APPENDECTOMY      IR PICC PLACEMENT SINGLE LUMEN  9/13/2021    IR PICC PLACEMENT SINGLE LUMEN  9/16/2021       No family history on file.  I have reviewed and agree with the history as documented.    E-Cigarette/Vaping    E-Cigarette Use Never User      E-Cigarette/Vaping Substances    Nicotine No     THC No     CBD No     Flavoring No     Other No     Unknown No      Social History     Tobacco  Use    Smoking status: Never    Smokeless tobacco: Never   Vaping Use    Vaping status: Never Used   Substance Use Topics    Alcohol use: Never    Drug use: No       Review of Systems   Constitutional:  Negative for chills and fever.   HENT:  Negative for ear pain and sore throat.    Eyes:  Negative for pain and visual disturbance.   Respiratory:  Negative for cough and shortness of breath.    Cardiovascular:  Negative for chest pain and palpitations.   Gastrointestinal:  Negative for abdominal pain and vomiting.   Genitourinary:  Negative for dysuria and hematuria.   Musculoskeletal:  Negative for arthralgias and back pain.   Skin:  Negative for color change and rash.   Neurological:  Negative for seizures and syncope.   Psychiatric/Behavioral:  Positive for behavioral problems.    All other systems reviewed and are negative.      Physical Exam  Physical Exam  Vitals and nursing note reviewed.   Constitutional:       General: He is not in acute distress.     Appearance: He is well-developed.   HENT:      Head: Normocephalic and atraumatic.   Eyes:      Conjunctiva/sclera: Conjunctivae normal.   Cardiovascular:      Rate and Rhythm: Normal rate and regular rhythm.      Heart sounds: No murmur heard.  Pulmonary:      Effort: Pulmonary effort is normal. No respiratory distress.      Breath sounds: Normal breath sounds.   Abdominal:      Palpations: Abdomen is soft.      Tenderness: There is no abdominal tenderness.   Musculoskeletal:         General: No swelling.      Cervical back: Neck supple.      Comments: Right hip non-tender. No deformity. No bruising/ecchymosis.    Skin:     General: Skin is warm and dry.      Capillary Refill: Capillary refill takes less than 2 seconds.   Neurological:      Mental Status: He is alert and oriented to person, place, and time.      GCS: GCS eye subscore is 4. GCS verbal subscore is 5. GCS motor subscore is 6.      Comments: GCS 15. AAOx3. CN II-XII grossly intact. No focal neuro  deficits.     Psychiatric:         Mood and Affect: Mood normal.         Vital Signs  ED Triage Vitals   Temperature Pulse Respirations Blood Pressure SpO2   02/27/24 1911 02/27/24 1906 02/27/24 1906 02/27/24 1906 02/27/24 1906   97.5 °F (36.4 °C) 91 18 144/68 96 %      Temp Source Heart Rate Source Patient Position - Orthostatic VS BP Location FiO2 (%)   02/27/24 1911 02/27/24 1906 02/27/24 1906 02/27/24 1906 --   Oral Monitor Lying Right arm       Pain Score       02/27/24 1906       4           Vitals:    02/27/24 1906 02/28/24 0604   BP: 144/68 139/74   Pulse: 91 71   Patient Position - Orthostatic VS: Lying Lying         Visual Acuity      ED Medications  Medications   divalproex sodium (DEPAKOTE) DR tablet 750 mg (750 mg Oral Given 2/28/24 0908)   divalproex sodium (DEPAKOTE) DR tablet 1,000 mg (1,000 mg Oral Not Given 2/28/24 0126)   docusate sodium (COLACE) capsule 100 mg (has no administration in time range)   gabapentin (NEURONTIN) capsule 400 mg (400 mg Oral Given 2/28/24 0901)   ibuprofen (MOTRIN) tablet 600 mg (has no administration in time range)   lacosamide (VIMPAT) tablet 150 mg (150 mg Oral Given 2/28/24 0902)   lacosamide (VIMPAT) tablet 200 mg (200 mg Oral Given 2/28/24 0052)   levETIRAcetam (KEPPRA) tablet 1,500 mg (1,500 mg Oral Given 2/28/24 0908)   levothyroxine tablet 150 mcg (150 mcg Oral Given 2/28/24 0901)   lisinopril (ZESTRIL) tablet 10 mg (10 mg Oral Given 2/28/24 0901)   loratadine (CLARITIN) tablet 10 mg (10 mg Oral Given 2/28/24 0902)   multivitamin stress formula tablet 1 tablet (1 tablet Oral Given 2/28/24 0903)   QUEtiapine (SEROquel XR) 24 hr tablet 200 mg (200 mg Oral Given 2/28/24 0900)   QUEtiapine (SEROquel XR) 24 hr tablet 400 mg (400 mg Oral Given 2/28/24 0054)   pravastatin (PRAVACHOL) tablet 80 mg (has no administration in time range)   temazepam (RESTORIL) capsule 15 mg (has no administration in time range)   zonisamide (ZONEGRAN) capsule 300 mg (100 mg Oral Not Given  2/28/24 0126)   acetaminophen (TYLENOL) tablet 650 mg (has no administration in time range)   enoxaparin (LOVENOX) subcutaneous injection 40 mg (40 mg Subcutaneous Given 2/28/24 0900)   insulin lispro (HumALOG/ADMELOG) 100 units/mL subcutaneous injection 1-5 Units (1 Units Subcutaneous Given 2/28/24 1137)   OLANZapine (ZyPREXA) IM injection 5 mg (has no administration in time range)   magnesium sulfate 4 g/100 mL IVPB (premix) 4 g (4 g Intravenous New Bag 2/28/24 1149)   LORazepam (ATIVAN) injection 1 mg (1 mg Intravenous Given 2/27/24 2118)   haloperidol lactate (HALDOL) injection 5 mg (5 mg Intramuscular Given 2/27/24 2121)       Diagnostic Studies  Results Reviewed       Procedure Component Value Units Date/Time    Fingerstick Glucose (POCT) [[id number]]  (Abnormal) Collected: 02/28/24 1110    Lab Status: Final result Updated: 02/28/24 1113     POC Glucose 186 mg/dl     Basic metabolic panel [[id number]] Collected: 02/28/24 0603    Lab Status: Final result Specimen: Blood from Arm, Right Updated: 02/28/24 0632     Sodium 138 mmol/L      Potassium 4.3 mmol/L      Chloride 106 mmol/L      CO2 25 mmol/L      ANION GAP 7 mmol/L      BUN 23 mg/dL      Creatinine 0.61 mg/dL      Glucose 102 mg/dL      Calcium 9.1 mg/dL      eGFR 111 ml/min/1.73sq m     Narrative:      National Kidney Disease Foundation guidelines for Chronic Kidney Disease (CKD):     Stage 1 with normal or high GFR (GFR > 90 mL/min/1.73 square meters)    Stage 2 Mild CKD (GFR = 60-89 mL/min/1.73 square meters)    Stage 3A Moderate CKD (GFR = 45-59 mL/min/1.73 square meters)    Stage 3B Moderate CKD (GFR = 30-44 mL/min/1.73 square meters)    Stage 4 Severe CKD (GFR = 15-29 mL/min/1.73 square meters)    Stage 5 End Stage CKD (GFR <15 mL/min/1.73 square meters)  Note: GFR calculation is accurate only with a steady state creatinine    Magnesium [[id number]]  (Abnormal) Collected: 02/28/24 0603    Lab Status: Final result Specimen: Blood from Arm, Right  Updated: 02/28/24 0632     Magnesium 1.6 mg/dL     CBC (With Platelets) [[id number]]  (Normal) Collected: 02/28/24 0603    Lab Status: Final result Specimen: Blood from Arm, Right Updated: 02/28/24 0614     WBC 9.85 Thousand/uL      RBC 3.97 Million/uL      Hemoglobin 12.9 g/dL      Hematocrit 37.8 %      MCV 95 fL      MCH 32.5 pg      MCHC 34.1 g/dL      RDW 13.5 %      Platelets 165 Thousands/uL      MPV 11.4 fL     TSH, 3rd generation [[id number]]  (Abnormal) Collected: 02/27/24 2031    Lab Status: Final result Specimen: Blood from Arm, Left Updated: 02/28/24 0003     TSH 3RD GENERATON 5.533 uIU/mL     Rapid drug screen, urine [[id number]]  (Normal) Collected: 02/27/24 2145    Lab Status: Final result Specimen: Urine, Clean Catch Updated: 02/27/24 2206     Amph/Meth UR Negative     Barbiturate Ur Negative     Benzodiazepine Urine Negative     Cocaine Urine Negative     Methadone Urine Negative     Opiate Urine Negative     PCP Ur Negative     THC Urine Negative     Oxycodone Urine Negative    Narrative:      FOR MEDICAL PURPOSES ONLY.   IF CONFIRMATION NEEDED PLEASE CONTACT THE LAB WITHIN 5 DAYS.    Drug Screen Cutoff Levels:  AMPHETAMINE/METHAMPHETAMINES  1000 ng/mL  BARBITURATES     200 ng/mL  BENZODIAZEPINES     200 ng/mL  COCAINE      300 ng/mL  METHADONE      300 ng/mL  OPIATES      300 ng/mL  PHENCYCLIDINE     25 ng/mL  THC       50 ng/mL  OXYCODONE      100 ng/mL    HS Troponin 0hr (reflex protocol) [[id number]]  (Normal) Collected: 02/27/24 2031    Lab Status: Final result Specimen: Blood from Arm, Left Updated: 02/27/24 2114     hs TnI 0hr <2 ng/L     Comprehensive metabolic panel [[id number]] Collected: 02/27/24 2031    Lab Status: Final result Specimen: Blood from Arm, Left Updated: 02/27/24 2103     Sodium 139 mmol/L      Potassium 4.2 mmol/L      Chloride 104 mmol/L      CO2 28 mmol/L      ANION GAP 7 mmol/L      BUN 24 mg/dL      Creatinine 0.61 mg/dL      Glucose 83 mg/dL      Calcium 9.3 mg/dL       AST 17 U/L      ALT 17 U/L      Alkaline Phosphatase 66 U/L      Total Protein 7.1 g/dL      Albumin 4.1 g/dL      Total Bilirubin 0.22 mg/dL      eGFR 111 ml/min/1.73sq m     Narrative:      National Kidney Disease Foundation guidelines for Chronic Kidney Disease (CKD):     Stage 1 with normal or high GFR (GFR > 90 mL/min/1.73 square meters)    Stage 2 Mild CKD (GFR = 60-89 mL/min/1.73 square meters)    Stage 3A Moderate CKD (GFR = 45-59 mL/min/1.73 square meters)    Stage 3B Moderate CKD (GFR = 30-44 mL/min/1.73 square meters)    Stage 4 Severe CKD (GFR = 15-29 mL/min/1.73 square meters)    Stage 5 End Stage CKD (GFR <15 mL/min/1.73 square meters)  Note: GFR calculation is accurate only with a steady state creatinine    CBC and differential [[id number]]  (Abnormal) Collected: 02/27/24 2031    Lab Status: Final result Specimen: Blood from Arm, Left Updated: 02/27/24 2044     WBC 7.95 Thousand/uL      RBC 3.86 Million/uL      Hemoglobin 12.6 g/dL      Hematocrit 36.6 %      MCV 95 fL      MCH 32.6 pg      MCHC 34.4 g/dL      RDW 13.5 %      MPV 10.8 fL      Platelets 184 Thousands/uL      nRBC 0 /100 WBCs      Neutrophils Relative 46 %      Immat GRANS % 1 %      Lymphocytes Relative 35 %      Monocytes Relative 12 %      Eosinophils Relative 5 %      Basophils Relative 1 %      Neutrophils Absolute 3.70 Thousands/µL      Immature Grans Absolute 0.04 Thousand/uL      Lymphocytes Absolute 2.79 Thousands/µL      Monocytes Absolute 0.96 Thousand/µL      Eosinophils Absolute 0.41 Thousand/µL      Basophils Absolute 0.05 Thousands/µL                    XR hip/pelv 2-3 vws right if performed   ED Interpretation by Adebayo Wadsworth PA-C (02/27 2102)   No acute abnormalities.      Final Result by Hasmukh Rivas MD (02/28 0856)      Right hip fracture and osteonecrosis again noted.      The study was marked in EPIC for immediate notification.            Workstation performed: [id number]         CT head without contrast    Final Result by Pradeep Wagner MD (02/27 2054)      No acute intracranial abnormality.                  Workstation performed: [id number]                    Procedures  Procedures         ED Course                                             Medical Decision Making  DDx including but not limited to: metabolic abnormality, intracranial etiology, cardiac etiology, substance abuse, infectious etiology including UTI, thyroid disease, hyperammonemia, delirium, sprain, strain, fracture. Plan: XR. CT head. Labs. Spoke to the patient's rehab. They refuse to take him back. Will likely need admission to search for new placement.     MDM: 57 yo presenting for agitation. Rehab he was staying at has refused to take him back. XR hip appears stable, no obvious changes from prior. He became aggressive and requiring haldol/ativan for agitation. Waist restraint for patient safety. Admit for placement and PT/OT eval.     Amount and/or Complexity of Data Reviewed  Labs: ordered.  Radiology: ordered and independent interpretation performed.    Risk  Prescription drug management.  Decision regarding hospitalization.             Disposition  Final diagnoses:   Agitation   Closed right hip fracture (HCC)     Time reflects when diagnosis was documented in both MDM as applicable and the Disposition within this note       Time User Action Codes Description Comment    2/27/2024  9:31 PM Adebayo Wadsworth Add [R45.1] Agitation     2/27/2024  9:31 PM Adebayo Wadsworth Add [S72.001A] Closed right hip fracture (HCC)     2/27/2024 10:56 PM Hector Padilla Add [F69] Behavior concern in adult     2/27/2024 10:58 PM Hector Padilla Add [S72.114A] Nondisplaced fracture of greater trochanter of right femur, initial encounter for closed fracture (HCC)           ED Disposition       ED Disposition   Admit    Condition   Stable    Date/Time   Tue Feb 27, 2024  9:31 PM    Comment   Case was discussed with Dr. Padilla and the patient's admission status was  agreed to be Admission Status: observation status to the service of Dr. Padilla .               Follow-up Information    None         Patient's Medications   Discharge Prescriptions    No medications on file       No discharge procedures on file.    PDMP Review         Value Time User    PDMP Reviewed  Yes 9/1/2023  2:41 PM HUNTER Hopson            ED Provider  Electronically Signed by             Adebayo Wadswotrh PA-C  02/28/24 8112

## 2024-02-28 NOTE — ASSESSMENT & PLAN NOTE
"Lab Results   Component Value Date    HGBA1C 5.5 11/27/2023       No results for input(s): \"POCGLU\" in the last 72 hours.    Blood Sugar Average: Last 72 hrs:    -Patient without evidence of hyper or hypoglycemia upon presentation  - Maintained on metformin in the outpatient setting, hold for time being  - Proceed with sliding scale insulin coverage  "

## 2024-02-28 NOTE — ASSESSMENT & PLAN NOTE
- Patient presents this evening from skilled nursing facility after an episode of agitation in which he broke chairs and became agitated  - Patient unreliable historian  - Recent history reviewed, patient presented initially to our institution on February 4, 2024 after mechanical fall-sustained nondisplaced fracture of the greater trochanter of the right femur-followed by orthopedic surgery-nonoperative management-eventual discharge to skilled nursing facility on February 22, 2024  - Over the course of hospitalization, followed by psychiatry, medication regimen optimized, cleared for discharge to SNF on that date  - This evening, patient had unprovoked episode of agitation in which he broke several chairs and was aggressive with staff, they are refusing to take him back  - Therefore, as patient unable to ambulate and without disposition plan, will observe    Plan:  - Observation status  - PT/OT  - Case management engaged-placement may be challenging giving social concerns  - Repeat imaging obtained on presentation-no malalignment or change

## 2024-02-28 NOTE — ASSESSMENT & PLAN NOTE
Disposition planning may pose challenges given patient's psychiatric history and history of outburst

## 2024-02-28 NOTE — CASE MANAGEMENT
Case Management Assessment & Discharge Planning Note    Patient name Jairon Oconnell  Location FT / MRN [id number]  : 1965 Date 2024       Current Admission Date: 2024  Current Admission Diagnosis:Ambulatory dysfunction   Patient Active Problem List    Diagnosis Date Noted    Nondisplaced fracture of greater trochanter of right femur, subsequent encounter for closed fracture with routine healing 2024    Closed nondisplaced fracture of proximal phalanx of left index finger with routine healing, subsequent encounter 10/30/2023    Pressure ulcer of sacral region, stage 3 (HCC) 2023    Ambulatory dysfunction 2022    Social problem 2021    Hypomagnesemia 2021    Thrombocytopathia ([facility]) 2020    Hyponatremia 2019    Metabolic encephalopathy 2019    Seborrheic dermatitis of scalp 2019    Nearsightedness 2019    Behavior concern in adult 2019    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[facility]) 2019    Vitamin B12 deficiency 2017    Hyperlipidemia 2016    Vitamin D deficiency 2016    Type 2 diabetes mellitus with diabetic neuropathy, without long-term current use of insulin ([facility]) 06/15/2016    Acquired hypothyroidism 06/15/2016    Cataract 2013    Cerebral palsy ([facility]) 2013    Generalized convulsive epilepsy ([facility]) 2013    Essential hypertension 2013    Osteoporosis 2013    Scoliosis 2013      LOS (days): 0  Geometric Mean LOS (GMLOS) (days):   Days to GMLOS:     OBJECTIVE:  PATIENT READMITTED TO HOSPITAL            Current admission status: Inpatient       Preferred Pharmacy:   [facility] - [address]  [address] [address]  Phone: [phone] Fax: [phone]    Primary Care Provider: HUNTER Brown    Primary Insurance: MEDICARE  Secondary [place] [employer]    ASSESSMENT:  Active Health Care Proxies     There are no active Health Care Proxies on file.       Readmission Root Cause  30 Day Readmission: No    Patient Information  Admitted from:: Facility ([facility] for [facility], unable to return)  Mental Status: Alert  During Assessment patient was accompanied by: Other-Comment (Madeline  Rad Care Coordinator)  Assessment information provided by:: Other - please comment (Madeline - Spectrum Care Coordinator)  Primary Caregiver: Self  Support Systems: Family members, Private Caregivers  County of Residence: [facility] do you live in?: [place]  Type of Current Residence: 2 story home  Living Arrangements: Other (Comment) (Lives with his caregiver, Jaylyn)    Activities of Daily Living Prior to Admission  Functional Status: Assistance  Completes ADLs independently?: No  Level of ADL dependence: Assistance  Ambulates independently?: No  Level of ambulatory dependence: Assistance  Does the patient have a history of Short-Term Rehab?: Yes ([facility])    Patient Information Continued  Does patient have prescription coverage?: Yes  Does patient have a history of substance abuse?: No  Does patient have a history of Mental Health Diagnosis?: No      Social Determinants of Health (SDOH)      Flowsheet Row Most Recent Value   Housing Stability    In the last 12 months, was there a time when you were not able to pay the mortgage or rent on time? N   In the last 12 months, how many places have you lived? 1   In the last 12 months, was there a time when you did not have a steady place to sleep or slept in a shelter (including now)? N   Transportation Needs    In the past 12 months, has lack of transportation kept you from medical appointments or from getting medications? no   In the past 12 months, has lack of transportation kept you from meetings, work, or from getting things needed for daily living? No   Food Insecurity    Within the past 12 months, you worried that your food would run out before you got the money to buy  more. Never true   Within the past 12 months, the food you bought just didn't last and you didn't have money to get more. Never true   Utilities    In the past 12 months has the electric, gas, oil, or water company threatened to shut off services in your home? No            DISCHARGE DETAILS:    Discharge planning discussed with:: Pt's Spectrum coordinatorMadeline ([phone])  Freedom of Choice: Yes  Comments - Freedom of Choice: Madeline reports that pt's sister and supports coordinator are in agreement that pt is no longer appropriate to return to his caregiver's home, he will likely need STR with possible transition to long term care.  CM contacted family/caregiver?: Yes (Called pt's sister 2x, left messages for a call back)  Were Treatment Team discharge recommendations reviewed with patient/caregiver?: Yes  Did patient/caregiver verbalize understanding of patient care needs?: Yes  Were patient/caregiver advised of the risks associated with not following Treatment Team discharge recommendations?: Yes    Contacts  Patient Contacts: Madeline/Rad Care Manager  Relationship to Patient:: Treatment Provider  Contact Method: Phone  Phone Number: [phone]  Reason/Outcome: Continuity of Care, Emergency Contact, Referral, Discharge Planning    Requested Home Health Care         Is the patient interested in HHC at discharge?: No    DME Referral Provided  Referral made for DME?: No    Other Referral/Resources/Interventions Provided:  Interventions: SNF  Referral Comments: STR referrals sent via [facility]    Treatment Team Recommendation: Short Term Rehab  Discharge Destination Plan:: Short Term Rehab     Additional Comments: Rad [place] Care Coordinator ([phone]), reports that pt typically resides at his caregiver, Jaylyn's, home. She reports that pt was at [facility] for short term rehab after he became injured however he had an episode prior to admission to the hospital where he became agitated with [facility] staff.  Per [facility] communication, they will no longer accept pt back to their facility. Pt still being recommended for STR and it's a possibility that pt may require LTC moving forward as pt is requiring too much assistance to live with his 75 year old caregiver at this point in time. Pt has a diagnosed intellectual disability and has a supports coordinator, Lila Rogers ([phone]), who is already looking into alternative placement options per Madeline. CM called and left a VM for Lila requesting a return call. Optioning paperwork for  [facility] started however unable to complete until family calls back to provide answers. CM department to follow.

## 2024-02-28 NOTE — ASSESSMENT & PLAN NOTE
Recent history reviewed, patient presented initially to our institution on February 4, 2024 after mechanical fall-sustained nondisplaced fracture of the greater trochanter of the right femur-followed by orthopedic surgery-nonoperative management-eventual discharge to skilled nursing facility on February 22, 2024  Patient presents this evening from skilled nursing facility after an episode of agitation in which he broke chairs and became agitated. Patient unreliable historian  XR hip: Right hip fracture and osteonecrosis again noted (no change noted)  CT head without acute intracranial abnormality  PT/OT evaluation  Fall precautions  CM consult for placement

## 2024-02-28 NOTE — ASSESSMENT & PLAN NOTE
Lab Results   Component Value Date    HGBA1C 5.5 11/27/2023       Recent Labs     02/28/24  1110   POCGLU 186*       Blood Sugar Average: Last 72 hrs:  (P) 186  Holding home metformin  Start on SSI with accu checks  Hypoglycemia protocol

## 2024-02-28 NOTE — PLAN OF CARE
Problem: PHYSICAL THERAPY ADULT  Goal: Performs mobility at highest level of function for planned discharge setting.  See evaluation for individualized goals.  Description: Treatment/Interventions: Functional transfer training, LE strengthening/ROM, Therapeutic exercise, Endurance training, Cognitive reorientation, Patient/family training, Bed mobility, Continued evaluation, Spoke to nursing, OT          See flowsheet documentation for full assessment, interventions and recommendations.  Note: Prognosis: Fair  Problem List: Decreased strength, Decreased endurance, Impaired balance, Decreased mobility, Decreased cognition, Orthopedic restrictions, Pain  Assessment: Pt is 58 year old male seen for PT evaluation s/p admit to [facility] on 2/27/2024 with Ambulatory dysfunction. PT consulted to assess pt's functional mobility and discharge needs. Order placed for PT evaluation and treatment. Comorbidities affecting pt's physical performance at time of assessment include cerebral palsy, type 2 DM, generalized convulsive epilepsy, hyperlipidemia, essential hypertension, acquired hypothyroidism, behavior concern in adult, social problem, and non-displaced fracture of greater trochanter of right femur. Prior to hospitalization, pt was at [facility] for rehab. Previously pt was required assist for mobility and ambulated without an AD. Pt resides with his caregiver in a two level house, but is able to stay on the first floor, with no steps to enter. Personal factors affecting pt at time of initial evaluation include inability to ambulate household distances, inability to navigate level surfaces without external assistance, unable to perform dynamic tasks in the community, limited home support, positive fall history, difficulty performing ADLs, and inability to perform IADLs. Please find objective findings from PT assessment regarding body systems outlined above with impairments and limitations including weakness,  impaired balance, decreased endurance, pain, decreased activity tolerance, decreased functional mobility tolerance, fall risk, orthopedic restrictions, and decreased cognition. The following objective measures were performed on initial evaluation Barthel Index: 30/100, Modified [place]: 4 (moderate/severe disability), and AM-PAC 6-Clicks: 6/24. Pt's clinical presentation is currently unstable/unpredictable seen in pt's presentation of need for ongoing medical management/monitoring, pt is a fall risk, and pt requires cues and assist of two for safety with functional mobility. Pt to benefit from continued PT treatment to address deficits as defined above and maximize pt's level of function and independence with mobility. From a PT standpoint, recommendation at time of discharge would be level 2, moderate resource intensity in order to facilitate return to prior level of function.    Barriers to Discharge: Decreased caregiver support, Other (Comment) (decline in functional mobility)     Rehab Resource Intensity Level, PT: II (Moderate Resource Intensity)    See flowsheet documentation for full assessment.

## 2024-02-28 NOTE — CASE MANAGEMENT
Case Management Discharge Planning Note    Patient name Jairon Oconnell  Location FT  MRN [id number]  : 1965 Date 2024       Current Admission Date: 2024  Current Admission Diagnosis:Ambulatory dysfunction   Patient Active Problem List    Diagnosis Date Noted    Nondisplaced fracture of greater trochanter of right femur, subsequent encounter for closed fracture with routine healing 2024    Closed nondisplaced fracture of proximal phalanx of left index finger with routine healing, subsequent encounter 10/30/2023    Pressure ulcer of sacral region, stage 3 (HCC) 2023    Ambulatory dysfunction 2022    Social problem 2021    Hypomagnesemia 2021    Thrombocytopathia ([facility]) 2020    Hyponatremia 2019    Metabolic encephalopathy 2019    Seborrheic dermatitis of scalp 2019    Nearsightedness 2019    Behavior concern in adult 2019    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HCC) 2019    Vitamin B12 deficiency 2017    Hyperlipidemia 2016    Vitamin D deficiency 2016    Type 2 diabetes mellitus with diabetic neuropathy, without long-term current use of insulin ([facility]) 06/15/2016    Acquired hypothyroidism 06/15/2016    Cataract 2013    Cerebral palsy ([facility]) 2013    Generalized convulsive epilepsy ([facility]) 2013    Essential hypertension 2013    Osteoporosis 2013    Scoliosis 2013      LOS (days): 0  Geometric Mean LOS (GMLOS) (days):   Days to GMLOS:     OBJECTIVE:  Risk of Unplanned Readmission Score: 18.21         Current admission status: Inpatient   Preferred Pharmacy:   [facility] - [address]  [address] [address]  Phone: [phone] Fax: [phone]    Primary Care Provider: HUNTER Brown    Primary Insurance: MEDICARE  Secondary [place] [employer]    DISCHARGE DETAILS:    Additional Comments: GORDON  received call back from Lila Rogers ([phone]) who confirmed that pt was diagnosed with moderate intellectual disability and cerebral palsy as an infant. As a result, pt is a positive PASRR. Optioning paperwork sent to [facility], awaiting LOD. She reported that pt's aggression was likely directly related to the fact that pt was not given his PRN psych meds at [facility] to follow.

## 2024-02-28 NOTE — PROGRESS NOTES
[facility]  Progress Note  Name: Jairon Oconnell I  MRN: [id number]  Unit/Bed#: FT 03 I Date of Admission: 2/27/2024   Date of Service: 2/28/2024 I Hospital Day: 0    Assessment/Plan   * Ambulatory dysfunction  Assessment & Plan  Recent history reviewed, patient presented initially to our institution on February 4, 2024 after mechanical fall-sustained nondisplaced fracture of the greater trochanter of the right femur-followed by orthopedic surgery-nonoperative management-eventual discharge to skilled nursing facility on February 22, 2024  Patient presents this evening from [facility] nursing facility after an episode of agitation in which he broke chairs and became agitated. Patient unreliable historian  XR hip: Right hip fracture and osteonecrosis again noted (no change noted)  CT head without acute intracranial abnormality  PT/OT evaluation  Fall precautions  CM consult for placement    Behavior concern in adult  Assessment & Plan  Unprovoked episode of agitation this evening at skilled nursing facility  Currently on 1:1 with posey belt in place  Resume previous regimen: Seroquel 200mg BID and 400mg QHS, Gabapentin 400mg TID, Zonisemide 300mg HS  Consult to Psychiatry, recommendations are appreciated    Nondisplaced fracture of greater trochanter of right femur, subsequent encounter for closed fracture with routine healing  Assessment & Plan  As above  PT/OT evaluation  CM for placement    Social problem  Assessment & Plan  Disposition planning may pose challenges given patient's psychiatric history and history of outburst    Acquired hypothyroidism  Assessment & Plan  Continue levothyroxine    Essential hypertension  Assessment & Plan  BP stable  Continue home lisinopril  Monitor vitals per routine    Hyperlipidemia  Assessment & Plan  Continue statin    Generalized convulsive epilepsy (HCC)  Assessment & Plan  Continue home Keppra, Vimpat, Zonegran  Seizure precautions    Type 2 diabetes  mellitus with diabetic neuropathy, without long-term current use of insulin ([facility])  Assessment & Plan  Lab Results   Component Value Date    HGBA1C 5.5 2023       Recent Labs     24  1110   POCGLU 186*       Blood Sugar Average: Last 72 hrs:  (P) 186  Holding home metformin  Start on SSI with accu checks  Hypoglycemia protocol             VTE Pharmacologic Prophylaxis: VTE Score: 8 Moderate Risk (Score 3-4) - Pharmacological DVT Prophylaxis Ordered: enoxaparin (Lovenox).    Mobility:      Not completed    Patient Centered Rounds: I performed bedside rounds with nursing staff today.   Discussions with Specialists or Other Care Team Provider: Nursing, Psychiatry    Education and Discussions with Family / Patient: Updated  (daughter) via phone.    Total Time Spent on Date of Encounter in care of patient: 45 mins. This time was spent on one or more of the following: performing physical exam; counseling and coordination of care; obtaining or reviewing history; documenting in the medical record; reviewing/ordering tests, medications or procedures; communicating with other healthcare professionals and discussing with patient's family/caregivers.    Current Length of Stay: 0 day(s)  Current Patient Status: Observation   Certification Statement: The patient will continue to require additional inpatient hospital stay due to placement  Discharge Plan: Anticipate discharge in 24-48 hrs to pending placement per CM    Code Status: Level 1 - Full Code    Subjective:   Patient laying in bed this morning. Denies any complaints. He appears agitated and refusing to follow commands or answer most questions.     Objective:     Vitals:   Temp (24hrs), Av.5 °F (36.4 °C), Min:97.5 °F (36.4 °C), Max:97.5 °F (36.4 °C)    Temp:  [97.5 °F (36.4 °C)] 97.5 °F (36.4 °C)  HR:  [71-91] 71  Resp:  [14-18] 14  BP: (139-144)/(68-74) 139/74  SpO2:  [96 %-100 %] 100 %  Body mass index is 19.88 kg/m².     Input and Output  Summary (last 24 hours):   No intake or output data in the 24 hours ending 02/28/24 1130    Physical Exam:   Physical Exam  Vitals and nursing note reviewed.   Constitutional:       General: He is not in acute distress.     Appearance: He is well-developed.   HENT:      Head: Normocephalic and atraumatic.   Eyes:      Conjunctiva/sclera: Conjunctivae normal.   Cardiovascular:      Rate and Rhythm: Normal rate and regular rhythm.      Heart sounds: No murmur heard.  Pulmonary:      Effort: Pulmonary effort is normal. No respiratory distress.      Breath sounds: Normal breath sounds.   Abdominal:      Palpations: Abdomen is soft.      Tenderness: There is no abdominal tenderness.   Musculoskeletal:         General: No swelling.      Cervical back: Neck supple.   Skin:     General: Skin is warm and dry.      Capillary Refill: Capillary refill takes less than 2 seconds.   Neurological:      Mental Status: He is alert.   Psychiatric:         Mood and Affect: Mood normal.          Additional Data:     Labs:  Results from last 7 days   Lab Units 02/28/24  0603 02/27/24  2031   WBC Thousand/uL 9.85 7.95   HEMOGLOBIN g/dL 12.9 12.6   HEMATOCRIT % 37.8 36.6   PLATELETS Thousands/uL 165 184   NEUTROS PCT %  --  46   LYMPHS PCT %  --  35   MONOS PCT %  --  12   EOS PCT %  --  5     Results from last 7 days   Lab Units 02/28/24  0603 02/27/24  2031   SODIUM mmol/L 138 139   POTASSIUM mmol/L 4.3 4.2   CHLORIDE mmol/L 106 104   CO2 mmol/L 25 28   BUN mg/dL 23 24   CREATININE mg/dL 0.61 0.61   ANION GAP mmol/L 7 7   CALCIUM mg/dL 9.1 9.3   ALBUMIN g/dL  --  4.1   TOTAL BILIRUBIN mg/dL  --  0.22   ALK PHOS U/L  --  66   ALT U/L  --  17   AST U/L  --  17   GLUCOSE RANDOM mg/dL 102 83         Results from last 7 days   Lab Units 02/28/24  1110 02/22/24  1144 02/22/24  0744 02/21/24  2035 02/21/24  1552   POC GLUCOSE mg/dl 186* 122 100 182* 143*               Lines/Drains:  Invasive Devices       Peripheral Intravenous Line  Duration              Peripheral IV 02/27/24 Dorsal (posterior);Left Forearm <1 day                          Imaging: Reviewed radiology reports from this admission including: xray(s)    Recent Cultures (last 7 days):         Last 24 Hours Medication List:   Current Facility-Administered Medications   Medication Dose Route Frequency Provider Last Rate    acetaminophen  650 mg Oral Q6H PRN Hector Rueter, DO      divalproex sodium  1,000 mg Oral HS Hector Rueter, DO      divalproex sodium  750 mg Oral Daily Hector Rueter, DO      docusate sodium  100 mg Oral BID PRN Hector Rueter, DO      enoxaparin  40 mg Subcutaneous Daily Hector Rueter, DO      gabapentin  400 mg Oral TID Hector Rueter, DO      ibuprofen  600 mg Oral Q6H PRN Hector Rueter, DO      insulin lispro  1-5 Units Subcutaneous TID AC Hector Rueter, DO      lacosamide  150 mg Oral Daily Hector Rueter, DO      lacosamide  200 mg Oral HS Hector Rueter, DO      levETIRAcetam  1,500 mg Oral Q12H RONALD Hector Rueter, DO      levothyroxine  150 mcg Oral Daily Hector Rueter, DO      lisinopril  10 mg Oral Daily Hector Rueter, DO      loratadine  10 mg Oral Daily Hector Rueter, DO      magnesium sulfate  4 g Intravenous Once Loretta Funez PA-C      multivitamin stress formula  1 tablet Oral Daily Hector Rueter, DO      OLANZapine  5 mg Intramuscular Q3H PRN Hector Rueter, DO      pravastatin  80 mg Oral Daily With Dinner Hector Rueter, DO      QUEtiapine  200 mg Oral BID Hector Rueter, DO      QUEtiapine  400 mg Oral HS Hector Rueter, DO      temazepam  15 mg Oral HS PRN Hector Rueter, DO      zonisamide  300 mg Oral HS Hector Rueter, DO          Today, Patient Was Seen By: Loretta Funez PA-C    **Please Note: This note may have been constructed using a voice recognition system.**

## 2024-02-28 NOTE — OCCUPATIONAL THERAPY NOTE
Occupational Therapy Evaluation      Jairon Sonireck    2/28/2024    Patient Active Problem List   Diagnosis    Cataract    Cerebral palsy (HCC)    Type 2 diabetes mellitus with diabetic neuropathy, without long-term current use of insulin (HCC)    Generalized convulsive epilepsy (HCC)    Hyperlipidemia    Essential hypertension    Acquired hypothyroidism    Osteoporosis    Scoliosis    Vitamin B12 deficiency    Vitamin D deficiency    Seborrheic dermatitis of scalp    Nearsightedness    Behavior concern in adult    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HCC)    Metabolic encephalopathy    Hyponatremia    Thrombocytopathia (HCC)    Hypomagnesemia    Social problem    Ambulatory dysfunction    Pressure ulcer of sacral region, stage 3 (HCC)    Closed nondisplaced fracture of proximal phalanx of left index finger with routine healing, subsequent encounter    Nondisplaced fracture of greater trochanter of right femur, subsequent encounter for closed fracture with routine healing       Past Medical History:   Diagnosis Date    ADRIANA (acute kidney injury) ([facility]) 9/24/2019    Bacteremia due to Gram-positive bacteria 9/7/2021    Buttock wound, left, subsequent encounter 11/5/2021    COVID-19     CP (cerebral palsy) ([facility])     Depression     Diabetes ([facility])     Disease of thyroid gland     Gait disorder     Gluteal abscess 9/6/2021    Hyperlipidemia     Hypertension     Kidney failure     Kidney stones     Moderate protein-calorie malnutrition (HCC) 12/22/2021    Osteoporosis     Pressure injury of left buttock, stage 4 (HCC) 3/9/2022    Psychiatric disorder     Scoliosis     Seizures ([facility])     Sepsis ([facility]) 9/25/2019    Thyroid disease     UTI (urinary tract infection)        Past Surgical History:   Procedure Laterality Date    APPENDECTOMY      IR PICC PLACEMENT SINGLE LUMEN  9/13/2021    IR PICC PLACEMENT SINGLE LUMEN  9/16/2021 02/28/24 0921   OT Last Visit   OT Visit Date 02/28/24   Note Type   Note type  Evaluation   Additional Comments Pt agreeable to OT eval. Upon arrival pt supine in bed with HOB elevated.   Pain Assessment   Pain Assessment Tool FLACC   Pain Rating: FLACC (Rest) - Face 0   Pain Rating: FLACC (Rest) - Legs 0   Pain Rating: FLACC (Rest) - Activity 0   Pain Rating: FLACC (Rest) - Cry 0   Pain Rating: FLACC (Rest) - Consolability 0   Score: FLACC (Rest) 0   Pain Rating: FLACC (Activity) - Face 1   Pain Rating: FLACC (Activity) - Legs 1   Pain Rating: FLACC (Activity) - Activity 1   Pain Rating: FLACC (Activity) - Cry 1   Pain Rating: FLACC (Activity) - Consolability 1   Score: FLACC (Activity) 5   Restrictions/Precautions   Weight Bearing Precautions Per Order Yes   RLE Weight Bearing Per Order WBAT  (no active hip abduction per chart review)   Other Precautions Cognitive;1:1;Restraints;Fall Risk;Pain   Home Living   Type of Home House   Home Layout Two level;Performs ADLs on one level;Able to live on main level with bedroom/bathroom;Access  (0 DHARMESH)   Bathroom Accessibility Accessible   Home Equipment Wheelchair-manual   Additional Comments PTA pt was at [facility] for STR.   Prior Function   Level of [place] Needs assistance with ADLs;Needs assistance with functional mobility;Needs assistance with IADLS   Lives With   (pt resides with caregiver)   Receives Help From Family  (caregiver & sister)   IADLs Family/Friend/Other provides transportation;Family/Friend/Other provides meals;Family/Friend/Other provides medication management   Falls in the last 6 months 1 to 4  (+fall history per chart review)   Comments Pt is a poor historian. Information regarding home setup and PLOF obtained from chart reivew.   Lifestyle   Autonomy Patient requires assist with ADLs/IADLs and lives at group home at baseline   Reciprocal Relationships Sister and caregiver   ADL   Eating Assistance 3  Moderate Assistance   Grooming Assistance 3  Moderate Assistance   UB Bathing Assistance 2  Maximal Assistance   LB  Bathing Assistance 1  Total Assistance   UB Dressing Assistance 2  Maximal Assistance   LB Dressing Assistance 1  Total Assistance   Toileting Assistance  1  Total Assistance   Additional Comments ADL levels based on functional performance during OT eval.   Bed Mobility   Supine to Sit 3  Moderate assistance   Additional items Assist x 2;HOB elevated;Bedrails;Increased time required;Verbal cues;LE management   Sit to Supine 3  Moderate assistance   Additional items Assist x 2;Increased time required;Verbal cues;LE management   Transfers   Sit to Stand 2  Maximal assistance   Additional items Assist x 2;Increased time required;Verbal cues   Stand to Sit 2  Maximal assistance   Additional items Assist x 2;Increased time required;Verbal cues   Additional Comments Pt tolerated standing at EOB for ~20 seconds. Pt utilized HHA 2/2 refusing RW. Pt limited by pain.   Balance   Static Sitting Fair -   Dynamic Sitting Poor +   Static Standing Poor -   Activity Tolerance   Activity Tolerance Patient limited by fatigue;Patient limited by pain   Medical Staff Made Aware Pt seen as a co-eval with PT due to the patient's co-morbidities and clinically unstable presentation indicated by chart review. During session, pt benefited from two skilled therapists due to extensive physical assistance to achieve transitional movements and pt's decreased activity tolerance.   RUE Assessment   RUE Assessment X  (~90 degree AAROM; unable to formally MMT d/t cognitive impairment)   LUE Assessment   LUE Assessment X  (~90 degree AAROM; unable to formally MMT d/t cognitive impairment)   Hand Function   Gross Motor Coordination Impaired   Fine Motor Coordination Impaired   Cognition   Overall Cognitive Status Impaired   Arousal/Participation Alert;Responsive;Cooperative   Attention Attends with cues to redirect   Orientation Level Oriented to person;Disoriented to place;Disoriented to time;Disoriented to situation   Memory Decreased recall of  recent events;Decreased short term memory   Following Commands Follows one step commands with increased time or repetition   Assessment   Limitation Decreased ADL status;Decreased UE ROM;Decreased UE strength;Decreased Safe judgement during ADL;Decreased cognition;Decreased endurance;Decreased fine motor control;Decreased self-care trans;Decreased high-level ADLs   Prognosis Good   Assessment Patient is a 58 y.o. male seen for OT evaluation s/p admit to [facility]  on 2/27/2024 w/Ambulatory dysfunction. Commorbidities affecting patient's functional performance at time of assessment include: DM2, generlized convulsive epilepsy, HLD, HTN, hypothyroidism, CP, and nondisplaced fx of R femur. Orders placed for OT evaluation and treatment. Performed at least two patient identifiers during session including name and wristband.  Prior to admission, Patient requires assist with ADLs/IADLs and lives at group home at baseline. Personal factors affecting patient at time of initial evaluation include: limited insight into deficits, decreased initiation and engagement, difficulty performing ADLs, and difficulty performing IADLs. Upon evaluation, patient requires maximal assist for UB ADLs, total assist for LB ADLs.  Patient is oriented to name,, disoriented to time,, disoriented to place,, and disoriented to situation, and presents with impaired judgement, inability to make safe decisions.  Occupational performance is affected by the following deficits: orientation, attention span, impulsive behavior, limited active ROM, decreased muscle strength, impaired gross motor coordination, impaired fine motor coordination, decreased activity tolerance, impaired memory, impaired problem solving, decreased safety awareness, increased pain, and delayed righting and equilibrium reactions. Patient to benefit from continued Occupational Therapy treatment while in the hospital to address deficits as defined above and maximize level  of functional independence with ADLs and functional mobility. Occupational Performance areas to address include: eating, grooming , bathing/ shower, dressing, toilet hygiene, transfer to all surfaces, and functional ambulation. From OT standpoint, recommendation at time of d/c would be Level II (moderate resource intensity).   Goals   Patient Goals to have less pain   Plan   Treatment Interventions ADL retraining;Functional transfer training;UE strengthening/ROM;Endurance training;Cognitive reorientation;Patient/family training;Equipment evaluation/education;Compensatory technique education;Fine motor coordination activities;Activityengagement   Goal Expiration Date 03/14/24   OT Treatment Day 0   OT Frequency 3-5x/wk   Discharge Recommendation   Rehab Resource Intensity Level, OT II (Moderate Resource Intensity)   AM-PAC Daily Activity Inpatient   Lower Body Dressing 1   Bathing 1   Toileting 1   Upper Body Dressing 2   Grooming 2   Eating 2   Daily Activity Raw Score 9   Turning Head Towards Sound 3   Follow Simple Instructions 2   Low Function Daily Activity Raw Score 14   Low Function Daily Activity Standardized Score  24.79   AM-PAC Applied Cognition Inpatient   Following a Speech/Presentation 2   Understanding Ordinary Conversation 3   Taking Medications 1   Remembering Where Things Are Placed or Put Away 1   Remembering List of 4-5 Errands 1   Taking Care of Complicated Tasks 1   Applied Cognition Raw Score 9   Applied Cognition Standardized Score 22.48   Modified [place] Scale   Modified Gui Scale 4   End of Consult   Patient Position at End of Consult Supine;All needs within reach;Other (comment)  (1:1 present)   Nurse Communication Nurse aware of consult     GOALS:    *ADL transfers with Min (A) for inc'd independence with ADLs/purposeful tasks    *UB ADL with Min (A) for inc'd independence with self cares    *LB ADL with Mod (A) using AE prn for inc'd independence with self cares    *Toileting with Mod  (A) for clothing management and hygiene for return to PLOF with personal care    *Increase stand tolerance x 3  m for inc'd tolerance with standing purposeful tasks    *Participate in 10m UE therex to increase overall stamina/activity tolerance for purposeful tasks    *Bed mobility- Min (A) for inc'd independence to manage own comfort and initiate EOB & OOB purposeful tasks    *Patient will verbalize 3 safety awareness/ principles to prevent falls in the home setting.     *Patient will increase OOB/sitting tolerance to 2-4 hours per day to increase participation in self-care and leisure tasks with no s/s of exertion.       Yanira Lange, OTSANTY, OTR/L

## 2024-02-28 NOTE — ASSESSMENT & PLAN NOTE
- Disposition planning may pose challenges given patient's psychiatric history and history of outburst

## 2024-02-28 NOTE — PLAN OF CARE
Problem: OCCUPATIONAL THERAPY ADULT  Goal: Performs self-care activities at highest level of function for planned discharge setting.  See evaluation for individualized goals.  Description: Treatment Interventions: ADL retraining, Functional transfer training, UE strengthening/ROM, Endurance training, Cognitive reorientation, Patient/family training, Equipment evaluation/education, Compensatory technique education, Fine motor coordination activities, Activityengagement          See flowsheet documentation for full assessment, interventions and recommendations.   Note: Limitation: Decreased ADL status, Decreased UE ROM, Decreased UE strength, Decreased Safe judgement during ADL, Decreased cognition, Decreased endurance, Decreased fine motor control, Decreased self-care trans, Decreased high-level ADLs  Prognosis: Good  Assessment: Patient is a 58 y.o. male seen for OT evaluation s/p admit to [facility]  on 2/27/2024 w/Ambulatory dysfunction. Commorbidities affecting patient's functional performance at time of assessment include: DM2, generlized convulsive epilepsy, HLD, HTN, hypothyroidism, CP, and nondisplaced fx of R femur. Orders placed for OT evaluation and treatment. Performed at least two patient identifiers during session including name and wristband.  Prior to admission, Patient requires assist with ADLs/IADLs and lives at group home at baseline. Personal factors affecting patient at time of initial evaluation include: limited insight into deficits, decreased initiation and engagement, difficulty performing ADLs, and difficulty performing IADLs. Upon evaluation, patient requires maximal assist for UB ADLs, total assist for LB ADLs.  Patient is oriented to name,, disoriented to time,, disoriented to place,, and disoriented to situation, and presents with impaired judgement, inability to make safe decisions.  Occupational performance is affected by the following deficits: orientation, attention  span, impulsive behavior, limited active ROM, decreased muscle strength, impaired gross motor coordination, impaired fine motor coordination, decreased activity tolerance, impaired memory, impaired problem solving, decreased safety awareness, increased pain, and delayed righting and equilibrium reactions. Patient to benefit from continued Occupational Therapy treatment while in the hospital to address deficits as defined above and maximize level of functional independence with ADLs and functional mobility. Occupational Performance areas to address include: eating, grooming , bathing/ shower, dressing, toilet hygiene, transfer to all surfaces, and functional ambulation. From OT standpoint, recommendation at time of d/c would be Level II (moderate resource intensity).     Rehab Resource Intensity Level, OT: II (Moderate Resource Intensity)     Yanira HELLER, OTR/L

## 2024-02-28 NOTE — ASSESSMENT & PLAN NOTE
- No evidence of seizure activity  - Chronic/stable  - Continue chronic medication regimen including Keppra, Vimpat, Zonegran

## 2024-02-29 VITALS
SYSTOLIC BLOOD PRESSURE: 84 MMHG | HEART RATE: 75 BPM | RESPIRATION RATE: 16 BRPM | WEIGHT: 146.61 LBS | TEMPERATURE: 98.3 F | DIASTOLIC BLOOD PRESSURE: 71 MMHG | OXYGEN SATURATION: 94 % | BODY MASS INDEX: 19.88 KG/M2

## 2024-02-29 LAB
ANION GAP SERPL CALCULATED.3IONS-SCNC: 6 MMOL/L
BUN SERPL-MCNC: 24 MG/DL (ref 5–25)
CALCIUM SERPL-MCNC: 9.1 MG/DL (ref 8.4–10.2)
CHLORIDE SERPL-SCNC: 103 MMOL/L (ref 96–108)
CO2 SERPL-SCNC: 29 MMOL/L (ref 21–32)
CREAT SERPL-MCNC: 0.81 MG/DL (ref 0.6–1.3)
ERYTHROCYTE [DISTWIDTH] IN BLOOD BY AUTOMATED COUNT: 13.8 % (ref 11.6–15.1)
GFR SERPL CREATININE-BSD FRML MDRD: 99 ML/MIN/1.73SQ M
GLUCOSE SERPL-MCNC: 112 MG/DL (ref 65–140)
GLUCOSE SERPL-MCNC: 131 MG/DL (ref 65–140)
GLUCOSE SERPL-MCNC: 144 MG/DL (ref 65–140)
GLUCOSE SERPL-MCNC: 177 MG/DL (ref 65–140)
GLUCOSE SERPL-MCNC: 99 MG/DL (ref 65–140)
HCT VFR BLD AUTO: 36.3 % (ref 36.5–49.3)
HGB BLD-MCNC: 12.3 G/DL (ref 12–17)
MAGNESIUM SERPL-MCNC: 2.2 MG/DL (ref 1.9–2.7)
MCH RBC QN AUTO: 32.5 PG (ref 26.8–34.3)
MCHC RBC AUTO-ENTMCNC: 33.9 G/DL (ref 31.4–37.4)
MCV RBC AUTO: 96 FL (ref 82–98)
MRSA NOSE QL CULT: NORMAL
PLATELET # BLD AUTO: 173 THOUSANDS/UL (ref 149–390)
PMV BLD AUTO: 10.8 FL (ref 8.9–12.7)
POTASSIUM SERPL-SCNC: 4.1 MMOL/L (ref 3.5–5.3)
RBC # BLD AUTO: 3.79 MILLION/UL (ref 3.88–5.62)
SODIUM SERPL-SCNC: 138 MMOL/L (ref 135–147)
WBC # BLD AUTO: 6.96 THOUSAND/UL (ref 4.31–10.16)

## 2024-02-29 PROCEDURE — 83735 ASSAY OF MAGNESIUM: CPT | Performed by: INTERNAL MEDICINE

## 2024-02-29 PROCEDURE — 85027 COMPLETE CBC AUTOMATED: CPT | Performed by: INTERNAL MEDICINE

## 2024-02-29 PROCEDURE — 99232 SBSQ HOSP IP/OBS MODERATE 35: CPT | Performed by: INTERNAL MEDICINE

## 2024-02-29 PROCEDURE — 82948 REAGENT STRIP/BLOOD GLUCOSE: CPT

## 2024-02-29 PROCEDURE — 80048 BASIC METABOLIC PNL TOTAL CA: CPT | Performed by: INTERNAL MEDICINE

## 2024-02-29 RX ORDER — LEVOTHYROXINE SODIUM 0.15 MG/1
150 TABLET ORAL DAILY
Status: CANCELLED | OUTPATIENT
Start: 2024-03-01

## 2024-02-29 RX ORDER — PRAVASTATIN SODIUM 80 MG/1
80 TABLET ORAL
Status: CANCELLED | OUTPATIENT
Start: 2024-02-29

## 2024-02-29 RX ORDER — DIVALPROEX SODIUM 500 MG/1
1000 TABLET, DELAYED RELEASE ORAL
Status: CANCELLED | OUTPATIENT
Start: 2024-02-29

## 2024-02-29 RX ORDER — GABAPENTIN 400 MG/1
400 CAPSULE ORAL 3 TIMES DAILY
Status: CANCELLED | OUTPATIENT
Start: 2024-02-29

## 2024-02-29 RX ORDER — OLANZAPINE 10 MG/2ML
5 INJECTION, POWDER, FOR SOLUTION INTRAMUSCULAR
Status: CANCELLED | OUTPATIENT
Start: 2024-02-29

## 2024-02-29 RX ORDER — ZONISAMIDE 100 MG/1
300 CAPSULE ORAL
Status: CANCELLED | OUTPATIENT
Start: 2024-02-29

## 2024-02-29 RX ORDER — TEMAZEPAM 15 MG/1
15 CAPSULE ORAL
Status: CANCELLED | OUTPATIENT
Start: 2024-02-29

## 2024-02-29 RX ORDER — INSULIN LISPRO 100 [IU]/ML
1-5 INJECTION, SOLUTION INTRAVENOUS; SUBCUTANEOUS
Status: CANCELLED | OUTPATIENT
Start: 2024-02-29

## 2024-02-29 RX ORDER — LORATADINE 10 MG/1
10 TABLET ORAL DAILY
Status: CANCELLED | OUTPATIENT
Start: 2024-03-01

## 2024-02-29 RX ORDER — IBUPROFEN 600 MG/1
600 TABLET ORAL EVERY 6 HOURS PRN
Status: CANCELLED | OUTPATIENT
Start: 2024-02-29

## 2024-02-29 RX ORDER — ACETAMINOPHEN 325 MG/1
650 TABLET ORAL EVERY 6 HOURS PRN
Status: CANCELLED | OUTPATIENT
Start: 2024-02-29

## 2024-02-29 RX ORDER — LISINOPRIL 10 MG/1
10 TABLET ORAL DAILY
Status: CANCELLED | OUTPATIENT
Start: 2024-03-01

## 2024-02-29 RX ORDER — LEVETIRACETAM 500 MG/1
1500 TABLET ORAL EVERY 12 HOURS SCHEDULED
Status: CANCELLED | OUTPATIENT
Start: 2024-02-29

## 2024-02-29 RX ORDER — QUETIAPINE 200 MG/1
200 TABLET, FILM COATED, EXTENDED RELEASE ORAL 2 TIMES DAILY
Status: CANCELLED | OUTPATIENT
Start: 2024-03-01

## 2024-02-29 RX ORDER — ENOXAPARIN SODIUM 100 MG/ML
40 INJECTION SUBCUTANEOUS DAILY
Status: CANCELLED | OUTPATIENT
Start: 2024-03-01

## 2024-02-29 RX ORDER — WATER 10 ML/10ML
INJECTION INTRAMUSCULAR; INTRAVENOUS; SUBCUTANEOUS
Status: DISCONTINUED
Start: 2024-02-29 | End: 2024-03-01 | Stop reason: HOSPADM

## 2024-02-29 RX ORDER — DOCUSATE SODIUM 100 MG/1
100 CAPSULE, LIQUID FILLED ORAL 2 TIMES DAILY PRN
Status: CANCELLED | OUTPATIENT
Start: 2024-02-29

## 2024-02-29 RX ORDER — LACOSAMIDE 100 MG/1
200 TABLET ORAL
Status: CANCELLED | OUTPATIENT
Start: 2024-02-29

## 2024-02-29 RX ORDER — QUETIAPINE 200 MG/1
400 TABLET, FILM COATED, EXTENDED RELEASE ORAL
Status: CANCELLED | OUTPATIENT
Start: 2024-02-29

## 2024-02-29 RX ADMIN — ACETAMINOPHEN 650 MG: 325 TABLET, FILM COATED ORAL at 22:08

## 2024-02-29 RX ADMIN — DIVALPROEX SODIUM 1000 MG: 500 TABLET, DELAYED RELEASE ORAL at 21:50

## 2024-02-29 RX ADMIN — LEVETIRACETAM 1500 MG: 500 TABLET, FILM COATED ORAL at 08:39

## 2024-02-29 RX ADMIN — QUETIAPINE FUMARATE 200 MG: 50 TABLET, EXTENDED RELEASE ORAL at 14:50

## 2024-02-29 RX ADMIN — LACOSAMIDE 150 MG: 100 TABLET, FILM COATED ORAL at 08:40

## 2024-02-29 RX ADMIN — GABAPENTIN 400 MG: 400 CAPSULE ORAL at 15:25

## 2024-02-29 RX ADMIN — B-COMPLEX W/ C & FOLIC ACID TAB 1 TABLET: TAB at 08:39

## 2024-02-29 RX ADMIN — ZONISAMIDE 300 MG: 100 CAPSULE ORAL at 22:07

## 2024-02-29 RX ADMIN — LACOSAMIDE 200 MG: 100 TABLET, FILM COATED ORAL at 21:51

## 2024-02-29 RX ADMIN — LEVETIRACETAM 1500 MG: 500 TABLET, FILM COATED ORAL at 21:45

## 2024-02-29 RX ADMIN — ENOXAPARIN SODIUM 40 MG: 40 INJECTION SUBCUTANEOUS at 08:39

## 2024-02-29 RX ADMIN — QUETIAPINE FUMARATE 200 MG: 50 TABLET, EXTENDED RELEASE ORAL at 09:12

## 2024-02-29 RX ADMIN — LORATADINE 10 MG: 10 TABLET ORAL at 08:39

## 2024-02-29 RX ADMIN — GABAPENTIN 400 MG: 400 CAPSULE ORAL at 21:51

## 2024-02-29 RX ADMIN — QUETIAPINE FUMARATE 400 MG: 200 TABLET, EXTENDED RELEASE ORAL at 22:10

## 2024-02-29 RX ADMIN — LISINOPRIL 10 MG: 10 TABLET ORAL at 08:41

## 2024-02-29 RX ADMIN — DIVALPROEX SODIUM 750 MG: 250 TABLET, DELAYED RELEASE ORAL at 08:39

## 2024-02-29 RX ADMIN — INSULIN LISPRO 1 UNITS: 100 INJECTION, SOLUTION INTRAVENOUS; SUBCUTANEOUS at 08:41

## 2024-02-29 RX ADMIN — LEVOTHYROXINE SODIUM 150 MCG: 150 TABLET ORAL at 08:39

## 2024-02-29 RX ADMIN — PRAVASTATIN SODIUM 80 MG: 80 TABLET ORAL at 16:09

## 2024-02-29 RX ADMIN — GABAPENTIN 400 MG: 400 CAPSULE ORAL at 08:39

## 2024-02-29 NOTE — CASE MANAGEMENT
Case Management Progress Note    Patient name Jairon Oconnell  Location /-01 MRN [id number]  : 1965 Date 2024       LOS (days): 1  Geometric Mean LOS (GMLOS) (days): 2.2  Days to GMLOS:1.5        OBJECTIVE:    Current admission status: Inpatient  Preferred Pharmacy:   [facility] - [address]  [address] [address]  Phone: [phone] Fax: [phone]    Primary Care Provider: HUNTER Brown  Primary Insurance: MEDICARE  [place] [employer] [place]    PROGRESS NOTE:  CM rounded with RN re: patient status.  Patient is currently off posey restraint, order to be discontinued.  RN reports no behavior concerns or issues.  Plan to is remove later this morning and will continue to follow.      Patient was recently admitted at [facility] for 18 days and d/c'ed to [facility] for STR.

## 2024-02-29 NOTE — CASE MANAGEMENT
Case Management Progress Note    Patient name Jairon Oconnell  Location /-01 MRN [id number]  : 1965 Date 2024       LOS (days): 1  Geometric Mean LOS (GMLOS) (days): 2.2  Days to GMLOS:1.2        OBJECTIVE:    Current admission status: Inpatient  Preferred Pharmacy:   [facility] - [address]  [address] [address]  Phone: [phone] Fax: [phone]    Primary Care Provider: HUNTER Brown  Primary Insurance: MEDICARE  [place] [employer]    PROGRESS NOTE:  CM expanded bed search in [facility] for SNF placement.  Search radius increased to 50 miles.  CM will continue to follow for d/c planning.

## 2024-02-29 NOTE — ASSESSMENT & PLAN NOTE
Lab Results   Component Value Date    HGBA1C 5.5 11/27/2023       Recent Labs     02/28/24  1640 02/28/24 2058 02/29/24  0758 02/29/24  1207   POCGLU 83 156* 177* 99         Blood Sugar Average: Last 72 hrs:  (P) 140.2  Holding home metformin  Start on SSI with accu checks  Hypoglycemia protocol

## 2024-02-29 NOTE — QUICK NOTE
Patient has been accepted to [facility] for ongoing placement needs  Discharge-readmit orders are in  Awaiting transport time- [facility] is at capacity so no known time frame as of now

## 2024-02-29 NOTE — ASSESSMENT & PLAN NOTE
Croup    Your toddler has a harsh cough that gets worse in the evening. Now she s woken up gasping for air. Chances are she has croup. This is an infection of the voice box (larynx) and windpipe (trachea). Croup causes the airways to swell, making it hard to breathe. It also causes a cough that can sound something like a seal barking.  Causes of croup  Croup mainly affects children between 6 months and 3 years of age, especially children younger than 2 years. But it can occur up to age 6. Older children have larger airways, so swelling isn t as likely to affect their breathing. Croup often follows a cold. It is usually caused by a virus and is most common between October and March.  When to go the emergency department  Mild croup can usually be treated at home with the home care methods listed below. Call your health care provider right away if you suspect croup. Take your child to the ER or a special emergency respiratory clinic if he or she has moderate to severe croup. And seek emergency care if you re worried, or if your child:    Makes a whistling sound (stridor) that becomes louder with each breath.    Has stridor when resting    Has a hard time swallowing his or her saliva or drools    Has increased difficulty breathing    Has a blue or dusky color around the fingernails, mouth, or nose    Struggles to catch his or her breath    Can't speak or make sounds  What to expect in the emergency department  A doctor will ask about your child s health history and listen to his or her breathing. Your child may be given a medicine that usually relieves swollen airways and other symptoms. In rare cases, the doctor may use a tube to help your child breathe.  Home care for croup  Croup can sound frightening. But in many cases, the following tips can help ease your child's breathing:    Don't let anyone smoke in your home. Smoke can make your child's cough worse.    Keep your child's head raised. Prop an older child up in  Continue home Keppra, Vimpat, Zonegran  Seizure precautions   "bed with extra pillows. Put an infant in a car seat. Never use pillows with an infant younger than 12 months old.    Sleep in the same room as your child while he or she is sick. You will be able to help your child right away if he or she has trouble breathing.    Stay calm. If your child sees that you are frightened, this will make your child more anxious and make it harder for him or her to breathe.    Offer words of comfort such as \"It will be OK. I'm right here with you.\"    Sing your child's favorite bedtime song.    Offer a back rub or hold your child.    Offer a favorite toy.  If the above tips don't help your child's breathing, you may try having your child breathe in steam from a shower or cool, moist night air. According to the American Academy of Pediatrics and the American Academy of Family Physicians, no studies prove that inhaling steam or moist air helps a child's breathing. But other medical experts still support this approach. Here's what to do:    Turn on the hot water in your bathroom shower.    Keep the door closed, so the room gets steamy.    Sit with your child in the steam for 15 or 20 minutes. Don't leave your child alone.    If your child wakes up at night, you can take him or her outdoors to breathe in cool night air. Make sure to wrap your child in warm clothing or blankets if the weather is chilly.   Date Last Reviewed: 10/1/2016    [id number] [employer]. [address] [place] [address], [place] [address]. All rights reserved. This information is not intended as a substitute for professional medical care. Always follow your healthcare professional's instructions.        "

## 2024-02-29 NOTE — PROGRESS NOTES
[facility]  Progress Note  Name: Jairon Oconnell I  MRN: [id number]  Unit/Bed#: -01 I Date of Admission: 2/27/2024   Date of Service: 2/29/2024 I Hospital Day: 1    Assessment/Plan   * Ambulatory dysfunction  Assessment & Plan  Recent history reviewed, patient presented initially to our institution on February 4, 2024 after mechanical fall-sustained nondisplaced fracture of the greater trochanter of the right femur-followed by orthopedic surgery-nonoperative management-eventual discharge to skilled nursing [facility] on February 22, 2024  Patient presents this evening from [facility] nursing [facility] after an episode of agitation in which he broke chairs and became agitated. Patient unreliable historian  XR hip: Right hip fracture and osteonecrosis again noted (no change noted)  CT head without acute intracranial abnormality  PT/OT evaluation  Fall precautions  Plan for transfer back to [place] (Buy Back) for placement needs    Behavior concern in adult  Assessment & Plan  Unprovoked episode of agitation this evening at skilled nursing facility  Resume previous regimen: Seroquel 200mg BID and 400mg QHS, Gabapentin 400mg TID, Zonisemide 300mg HS  PRN zyprexa for agitation  Family declined Psychiatric consult  1:1 observation and posey belt removed today as patient remains less agitated    Nondisplaced fracture of greater trochanter of right femur, subsequent encounter for closed fracture with routine healing  Assessment & Plan  As above  PT/OT evaluation  CM for placement    Social problem  Assessment & Plan  Disposition planning may pose challenges given patient's psychiatric history and history of outburst    Acquired hypothyroidism  Assessment & Plan  Continue levothyroxine    Essential hypertension  Assessment & Plan  BP stable  Continue home lisinopril  Monitor vitals per routine    Hyperlipidemia  Assessment & Plan  Continue statin    Generalized convulsive epilepsy (HCC)  Assessment &  Plan  Continue home Keppra, Vimpat, Zonegran  Seizure precautions    Type 2 diabetes mellitus with diabetic neuropathy, without long-term current use of insulin (HCC)  Assessment & Plan  Lab Results   Component Value Date    HGBA1C 5.5 11/27/2023       Recent Labs     02/28/24  1640 02/28/24 2058 02/29/24  0758 02/29/24  1207   POCGLU 83 156* 177* 99         Blood Sugar Average: Last 72 hrs:  (P) 140.2  Holding home metformin  Start on SSI with accu checks  Hypoglycemia protocol             VTE Pharmacologic Prophylaxis: VTE Score: 8 Moderate Risk (Score 3-4) - Pharmacological DVT Prophylaxis Ordered: enoxaparin (Lovenox).    Mobility:   Basic Mobility Inpatient Raw Score: 6  JH-HLM Goal: 2: Bed activities/Dependent transfer  JH-HLM Achieved: 1: Laying in bed  HLM Goal NOT achieved. Continue with multidisciplinary rounding and encourage appropriate mobility to improve upon HLM goals.    Patient Centered Rounds: I performed bedside rounds with nursing staff today.   Discussions with Specialists or Other Care Team Provider: Nursing, CM    Education and Discussions with Family / Patient: Updated  (sister) via phone.    Total Time Spent on Date of Encounter in care of patient: 45 mins. This time was spent on one or more of the following: performing physical exam; counseling and coordination of care; obtaining or reviewing history; documenting in the medical record; reviewing/ordering tests, medications or procedures; communicating with other healthcare professionals and discussing with patient's family/caregivers.    Current Length of Stay: 1 day(s)  Current Patient Status: Inpatient   Certification Statement: The patient will continue to require additional inpatient hospital stay due to placement  Discharge Plan:  transfer to [facility] for ongoing placement needs    Code Status: Level 1 - Full Code    Subjective:   Patient more pleasant this morning. Eating breakfast. Denies any  complaints.    Objective:     Vitals:   Temp (24hrs), Av.7 °F (36.5 °C), Min:97.1 °F (36.2 °C), Max:98.3 °F (36.8 °C)    Temp:  [97.1 °F (36.2 °C)-98.3 °F (36.8 °C)] 98.3 °F (36.8 °C)  HR:  [74-81] 75  Resp:  [16-18] 16  BP: (114-134)/(71-80) 134/80  SpO2:  [94 %-96 %] 94 %  Body mass index is 19.88 kg/m².     Input and Output Summary (last 24 hours):     Intake/Output Summary (Last 24 hours) at 2024 1428  Last data filed at 2024 1215  Gross per 24 hour   Intake 460 ml   Output 2025 ml   Net -1565 ml       Physical Exam:   Physical Exam  Vitals and nursing note reviewed.   Constitutional:       General: He is not in acute distress.     Appearance: He is well-developed.   HENT:      Head: Normocephalic and atraumatic.   Eyes:      Conjunctiva/sclera: Conjunctivae normal.   Cardiovascular:      Rate and Rhythm: Normal rate and regular rhythm.      Heart sounds: No murmur heard.  Pulmonary:      Effort: Pulmonary effort is normal. No respiratory distress.      Breath sounds: Normal breath sounds.   Abdominal:      Palpations: Abdomen is soft.      Tenderness: There is no abdominal tenderness.   Musculoskeletal:         General: No swelling.      Cervical back: Neck supple.   Skin:     General: Skin is warm and dry.      Capillary Refill: Capillary refill takes less than 2 seconds.   Neurological:      Mental Status: He is alert.   Psychiatric:         Behavior: Behavior normal.          Additional Data:     Labs:  Results from last 7 days   Lab Units 24  0617 24  0603 24  2031   WBC Thousand/uL 6.96   < > 7.95   HEMOGLOBIN g/dL 12.3   < > 12.6   HEMATOCRIT % 36.3*   < > 36.6   PLATELETS Thousands/uL 173   < > 184   NEUTROS PCT %  --   --  46   LYMPHS PCT %  --   --  35   MONOS PCT %  --   --  12   EOS PCT %  --   --  5    < > = values in this interval not displayed.     Results from last 7 days   Lab Units 24  0617 24  0603 24  2031   SODIUM mmol/L 138   < > 139    POTASSIUM mmol/L 4.1   < > 4.2   CHLORIDE mmol/L 103   < > 104   CO2 mmol/L 29   < > 28   BUN mg/dL 24   < > 24   CREATININE mg/dL 0.81   < > 0.61   ANION GAP mmol/L 6   < > 7   CALCIUM mg/dL 9.1   < > 9.3   ALBUMIN g/dL  --   --  4.1   TOTAL BILIRUBIN mg/dL  --   --  0.22   ALK PHOS U/L  --   --  66   ALT U/L  --   --  17   AST U/L  --   --  17   GLUCOSE RANDOM mg/dL 131   < > 83    < > = values in this interval not displayed.         Results from last 7 days   Lab Units 02/29/24  1207 02/29/24  0758 02/28/24  2058 02/28/24  1640 02/28/24  1110   POC GLUCOSE mg/dl 99 177* 156* 83 186*               Lines/Drains:  Invasive Devices       Peripheral Intravenous Line  Duration             Peripheral IV 02/27/24 Dorsal (posterior);Left Forearm 1 day              Drain  Duration             Urethral Catheter Double-lumen 16 Fr. <1 day                  Urinary Catheter:  Goal for removal: Voiding trial when ambulation improves               Imaging: No pertinent imaging reviewed.    Recent Cultures (last 7 days):         Last 24 Hours Medication List:   Current Facility-Administered Medications   Medication Dose Route Frequency Provider Last Rate    acetaminophen  650 mg Oral Q6H PRN Hector Rueter, DO      divalproex sodium  1,000 mg Oral HS Hector Rueter, DO      divalproex sodium  750 mg Oral Daily Hcetor Rueter, DO      docusate sodium  100 mg Oral BID PRN Hector Rueter, DO      enoxaparin  40 mg Subcutaneous Daily Hector Rueter, DO      gabapentin  400 mg Oral TID Hector Rueter, DO      ibuprofen  600 mg Oral Q6H PRN Hector Rueter, DO      insulin lispro  1-5 Units Subcutaneous TID AC Hector Rueter, DO      lacosamide  150 mg Oral Daily Hector Rueter, DO      lacosamide  200 mg Oral HS Hector Rueter, DO      levETIRAcetam  1,500 mg Oral Q12H [facility] Hector Rueter, DO      levothyroxine  150 mcg Oral Daily Hector Rueter, DO      lisinopril  10 mg Oral Daily Hector Rueter, DO      loratadine  10 mg Oral Daily Hector Rueter, DO       multivitamin stress formula  1 tablet Oral Daily Hector Rueter, DO      OLANZapine  5 mg Intramuscular Q3H PRN Hector Rueter, DO      pravastatin  80 mg Oral Daily With Dinner Hector Rueter, DO      QUEtiapine  200 mg Oral BID Hector Rueter, DO      QUEtiapine  400 mg Oral HS Hector Rueter, DO      temazepam  15 mg Oral HS PRN Hector Rueter, DO      zonisamide  300 mg Oral HS Hector Rueter, DO          Today, Patient Was Seen By: Loretta Funez PA-C    **Please Note: This note may have been constructed using a voice recognition system.**

## 2024-02-29 NOTE — ASSESSMENT & PLAN NOTE
Recent history reviewed, patient presented initially to our institution on February 4, 2024 after mechanical fall-sustained nondisplaced fracture of the greater trochanter of the right femur-followed by orthopedic surgery-nonoperative management-eventual discharge to skilled nursing facility on February 22, 2024  Patient presents this evening from skilled nursing facility after an episode of agitation in which he broke chairs and became agitated. Patient unreliable historian  XR hip: Right hip fracture and osteonecrosis again noted (no change noted)  CT head without acute intracranial abnormality  PT/OT evaluation  Fall precautions  Plan for transfer back to [place] (Buy Back) for placement needs

## 2024-02-29 NOTE — ASSESSMENT & PLAN NOTE
Unprovoked episode of agitation this evening at skilled nursing facility  Resume previous regimen: Seroquel 200mg BID and 400mg QHS, Gabapentin 400mg TID, Zonisemide 300mg HS  PRN zyprexa for agitation  Family declined Psychiatric consult  1:1 observation and posey belt removed today as patient remains less agitated

## 2024-02-29 NOTE — QUICK NOTE
Notified by RN, patient having little to no urine output overnight. Bladder scan reveals >600ml in the bladder. Ok to insert chapman.

## 2024-03-01 ENCOUNTER — HOSPITAL ENCOUNTER (INPATIENT)
Facility: HOSPITAL | Age: 59
LOS: 31 days | Discharge: HOME HEALTH | DRG: 555 | End: 2024-04-01
Attending: INTERNAL MEDICINE | Admitting: INTERNAL MEDICINE
Payer: MEDICARE

## 2024-03-01 DIAGNOSIS — G40.309 GENERALIZED CONVULSIVE EPILEPSY (HCC): Chronic | ICD-10-CM

## 2024-03-01 DIAGNOSIS — F69 BEHAVIOR CONCERN IN ADULT: ICD-10-CM

## 2024-03-01 DIAGNOSIS — S72.114D NONDISPLACED FRACTURE OF GREATER TROCHANTER OF RIGHT FEMUR, SUBSEQUENT ENCOUNTER FOR CLOSED FRACTURE WITH ROUTINE HEALING: Primary | ICD-10-CM

## 2024-03-01 LAB
GLUCOSE SERPL-MCNC: 101 MG/DL (ref 65–140)
GLUCOSE SERPL-MCNC: 114 MG/DL (ref 65–140)
GLUCOSE SERPL-MCNC: 155 MG/DL (ref 65–140)
GLUCOSE SERPL-MCNC: 96 MG/DL (ref 65–140)

## 2024-03-01 PROCEDURE — 82948 REAGENT STRIP/BLOOD GLUCOSE: CPT

## 2024-03-01 PROCEDURE — 97163 PT EVAL HIGH COMPLEX 45 MIN: CPT

## 2024-03-01 PROCEDURE — 99223 1ST HOSP IP/OBS HIGH 75: CPT | Performed by: INTERNAL MEDICINE

## 2024-03-01 PROCEDURE — 87081 CULTURE SCREEN ONLY: CPT | Performed by: INTERNAL MEDICINE

## 2024-03-01 PROCEDURE — 97167 OT EVAL HIGH COMPLEX 60 MIN: CPT

## 2024-03-01 PROCEDURE — 99239 HOSP IP/OBS DSCHRG MGMT >30: CPT | Performed by: INTERNAL MEDICINE

## 2024-03-01 RX ORDER — LEVETIRACETAM 500 MG/1
1500 TABLET ORAL EVERY 12 HOURS SCHEDULED
Status: DISCONTINUED | OUTPATIENT
Start: 2024-03-01 | End: 2024-04-01 | Stop reason: HOSPADM

## 2024-03-01 RX ORDER — TEMAZEPAM 7.5 MG/1
15 CAPSULE ORAL
Status: DISCONTINUED | OUTPATIENT
Start: 2024-03-01 | End: 2024-03-26

## 2024-03-01 RX ORDER — INSULIN LISPRO 100 [IU]/ML
1-6 INJECTION, SOLUTION INTRAVENOUS; SUBCUTANEOUS
Status: DISCONTINUED | OUTPATIENT
Start: 2024-03-01 | End: 2024-04-01 | Stop reason: HOSPADM

## 2024-03-01 RX ORDER — IBUPROFEN 600 MG/1
600 TABLET ORAL EVERY 6 HOURS PRN
Status: DISCONTINUED | OUTPATIENT
Start: 2024-03-01 | End: 2024-03-02

## 2024-03-01 RX ORDER — QUETIAPINE 400 MG/1
400 TABLET, FILM COATED, EXTENDED RELEASE ORAL
Status: DISCONTINUED | OUTPATIENT
Start: 2024-03-01 | End: 2024-03-08

## 2024-03-01 RX ORDER — ZONISAMIDE 100 MG/1
300 CAPSULE ORAL
Status: DISCONTINUED | OUTPATIENT
Start: 2024-03-01 | End: 2024-04-01 | Stop reason: HOSPADM

## 2024-03-01 RX ORDER — DOCUSATE SODIUM 100 MG/1
100 CAPSULE, LIQUID FILLED ORAL 2 TIMES DAILY PRN
Status: DISCONTINUED | OUTPATIENT
Start: 2024-03-01 | End: 2024-04-01 | Stop reason: HOSPADM

## 2024-03-01 RX ORDER — LISINOPRIL 10 MG/1
10 TABLET ORAL DAILY
Status: DISCONTINUED | OUTPATIENT
Start: 2024-03-01 | End: 2024-04-01 | Stop reason: HOSPADM

## 2024-03-01 RX ORDER — LEVOTHYROXINE SODIUM 0.07 MG/1
150 TABLET ORAL DAILY
Status: DISCONTINUED | OUTPATIENT
Start: 2024-03-01 | End: 2024-04-01 | Stop reason: HOSPADM

## 2024-03-01 RX ORDER — INSULIN LISPRO 100 [IU]/ML
1-5 INJECTION, SOLUTION INTRAVENOUS; SUBCUTANEOUS
Status: DISCONTINUED | OUTPATIENT
Start: 2024-03-01 | End: 2024-04-01 | Stop reason: HOSPADM

## 2024-03-01 RX ORDER — PRAVASTATIN SODIUM 40 MG
80 TABLET ORAL
Status: DISCONTINUED | OUTPATIENT
Start: 2024-03-01 | End: 2024-04-01 | Stop reason: HOSPADM

## 2024-03-01 RX ORDER — LORATADINE 10 MG/1
10 TABLET ORAL DAILY
Status: DISCONTINUED | OUTPATIENT
Start: 2024-03-01 | End: 2024-04-01 | Stop reason: HOSPADM

## 2024-03-01 RX ORDER — OLANZAPINE 10 MG/2ML
5 INJECTION, POWDER, FOR SOLUTION INTRAMUSCULAR
Status: DISCONTINUED | OUTPATIENT
Start: 2024-03-01 | End: 2024-04-01 | Stop reason: HOSPADM

## 2024-03-01 RX ORDER — ACETAMINOPHEN 325 MG/1
650 TABLET ORAL EVERY 6 HOURS PRN
Status: DISCONTINUED | OUTPATIENT
Start: 2024-03-01 | End: 2024-03-10

## 2024-03-01 RX ORDER — ENOXAPARIN SODIUM 100 MG/ML
40 INJECTION SUBCUTANEOUS DAILY
Status: DISCONTINUED | OUTPATIENT
Start: 2024-03-01 | End: 2024-04-01 | Stop reason: HOSPADM

## 2024-03-01 RX ORDER — DIVALPROEX SODIUM 500 MG/1
1000 TABLET, DELAYED RELEASE ORAL
Status: DISCONTINUED | OUTPATIENT
Start: 2024-03-01 | End: 2024-03-14

## 2024-03-01 RX ORDER — LACOSAMIDE 150 MG/1
150 TABLET ORAL DAILY
Status: DISCONTINUED | OUTPATIENT
Start: 2024-03-01 | End: 2024-04-01 | Stop reason: HOSPADM

## 2024-03-01 RX ORDER — GABAPENTIN 400 MG/1
400 CAPSULE ORAL 3 TIMES DAILY
Status: DISCONTINUED | OUTPATIENT
Start: 2024-03-01 | End: 2024-04-01 | Stop reason: HOSPADM

## 2024-03-01 RX ADMIN — DIVALPROEX SODIUM 750 MG: 250 TABLET, DELAYED RELEASE ORAL at 08:46

## 2024-03-01 RX ADMIN — INSULIN LISPRO 1 UNITS: 100 INJECTION, SOLUTION INTRAVENOUS; SUBCUTANEOUS at 21:04

## 2024-03-01 RX ADMIN — ZONISAMIDE 300 MG: 100 CAPSULE ORAL at 21:05

## 2024-03-01 RX ADMIN — LISINOPRIL 10 MG: 10 TABLET ORAL at 08:46

## 2024-03-01 RX ADMIN — LORATADINE 10 MG: 10 TABLET ORAL at 08:45

## 2024-03-01 RX ADMIN — ENOXAPARIN SODIUM 40 MG: 40 INJECTION SUBCUTANEOUS at 08:45

## 2024-03-01 RX ADMIN — GABAPENTIN 400 MG: 400 CAPSULE ORAL at 21:05

## 2024-03-01 RX ADMIN — LACOSAMIDE 150 MG: 150 TABLET ORAL at 08:45

## 2024-03-01 RX ADMIN — LEVETIRACETAM 1500 MG: 500 TABLET, FILM COATED ORAL at 21:04

## 2024-03-01 RX ADMIN — GABAPENTIN 400 MG: 400 CAPSULE ORAL at 08:45

## 2024-03-01 RX ADMIN — DIVALPROEX SODIUM 1000 MG: 500 TABLET, DELAYED RELEASE ORAL at 21:05

## 2024-03-01 RX ADMIN — PRAVASTATIN SODIUM 80 MG: 40 TABLET ORAL at 16:50

## 2024-03-01 RX ADMIN — QUETIAPINE 400 MG: 400 TABLET, FILM COATED, EXTENDED RELEASE ORAL at 21:05

## 2024-03-01 RX ADMIN — QUETIAPINE FUMARATE 200 MG: 150 TABLET, EXTENDED RELEASE ORAL at 08:49

## 2024-03-01 RX ADMIN — B-COMPLEX W/ C & FOLIC ACID TAB 1 TABLET: TAB at 08:46

## 2024-03-01 RX ADMIN — LEVETIRACETAM 1500 MG: 500 TABLET, FILM COATED ORAL at 08:46

## 2024-03-01 RX ADMIN — LACOSAMIDE 200 MG: 150 TABLET ORAL at 21:05

## 2024-03-01 RX ADMIN — QUETIAPINE FUMARATE 200 MG: 150 TABLET, EXTENDED RELEASE ORAL at 14:29

## 2024-03-01 RX ADMIN — GABAPENTIN 400 MG: 400 CAPSULE ORAL at 16:50

## 2024-03-01 RX ADMIN — LEVOTHYROXINE SODIUM 150 MCG: 75 TABLET ORAL at 08:45

## 2024-03-01 NOTE — PHYSICAL THERAPY NOTE
Physical Therapy Evaluation     Patient's Name: Jairon Oconnell    Admitting Diagnosis  Ambulatory dysfunction [R26.2]    Problem List  Patient Active Problem List   Diagnosis    Cataract    Cerebral palsy (HCC)    Type 2 diabetes mellitus with diabetic neuropathy, without long-term current use of insulin (HCC)    Generalized convulsive epilepsy ([facility])    Hyperlipidemia    Essential hypertension    Acquired hypothyroidism    Osteoporosis    Scoliosis    Vitamin B12 deficiency    Vitamin D deficiency    Seborrheic dermatitis of scalp    Nearsightedness    Behavior concern in adult    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[facility])    Metabolic encephalopathy    Hyponatremia    Thrombocytopathia ([facility])    Hypomagnesemia    Social problem    Ambulatory dysfunction    Pressure ulcer of sacral region, stage 3 ([facility])    Closed nondisplaced fracture of proximal phalanx of left index finger with routine healing, subsequent encounter    Nondisplaced fracture of greater trochanter of right femur, subsequent encounter for closed fracture with routine healing       Past Medical History  Past Medical History:   Diagnosis Date    ADRIANA (acute kidney injury) ([facility]) 9/24/2019    Bacteremia due to Gram-positive bacteria 9/7/2021    Buttock wound, left, subsequent encounter 11/5/2021    COVID-19     CP (cerebral palsy) ([facility])     Depression     Diabetes ([facility])     Disease of thyroid gland     Gait disorder     Gluteal abscess 9/6/2021    Hyperlipidemia     Hypertension     Kidney failure     Kidney stones     Moderate protein-calorie malnutrition ([facility]) 12/22/2021    Osteoporosis     Pressure injury of left buttock, stage 4 ([facility]) 3/9/2022    Psychiatric disorder     Scoliosis     Seizures ([facility])     Sepsis ([facility]) 9/25/2019    Thyroid disease     UTI (urinary tract infection)        Past Surgical History  Past Surgical History:   Procedure Laterality Date    APPENDECTOMY      IR PICC PLACEMENT SINGLE LUMEN  9/13/2021    IR PICC PLACEMENT SINGLE  LUMEN  9/16/2021 03/01/24 0926   PT Last Visit   PT Visit Date 03/01/24   Note Type   Note type Evaluation   Pain Assessment   Pain Rating: FLACC (Rest) - Face 0   Pain Rating: FLACC (Rest) - Legs 0   Pain Rating: FLACC (Rest) - Activity 0   Pain Rating: FLACC (Rest) - Cry 0   Pain Rating: FLACC (Rest) - Consolability 0   Score: FLACC (Rest) 0   Pain Rating: FLACC (Activity) - Face 1   Pain Rating: FLACC (Activity) - Legs 1   Pain Rating: FLACC (Activity) - Activity 1   Pain Rating: FLACC (Activity) - Cry 1   Pain Rating: FLACC (Activity) - Consolability 1   Score: FLACC (Activity) 5   Restrictions/Precautions   Weight Bearing Precautions Per Order Yes   RLE Weight Bearing Per Order WBAT  (no active hip ABduction as per recent hip surgery)   Other Precautions Impulsive;Cognitive;Chair Alarm;Bed Alarm;Fall Risk;Pain;Combative   Home Living   Type of Home SNF  (recent transition to [facility] for PAR)   Home Layout Performs ADLs on one level;Able to live on main level with bedroom/bathroom;Access   Bathroom Shower/Tub Walk-in shower   Bathroom Toilet Standard   Bathroom Equipment Grab bars in shower;Grab bars around toilet   Bathroom Accessibility Accessible   Home Equipment Walker;Wheelchair-manual   Prior Function   Level of [place] Needs assistance with ADLs;Needs assistance with functional mobility;Needs assistance with IADLS   Lives With Facility staff   Receives Help From Personal care attendant   IADLs Family/Friend/Other provides transportation;Family/Friend/Other provides meals;Family/Friend/Other provides medication management   Falls in the last 6 months 1 to 4   General   Additional Pertinent History Myrisa OT present for co-assessment due to medical complexity, required skilled interventions of 2 clinicians for care delivery.   Family/Caregiver Present No   Cognition   Overall Cognitive Status Impaired   Arousal/Participation Alert   Attention Difficulty attending to directions    Orientation Level Unable to assess   Memory Decreased recall of precautions   Following Commands Follows one step commands inconsistently   RLE Assessment   RLE Assessment X  (3/5 gross musculature)   LLE Assessment   LLE Assessment X  (3+/5 gross musculature)   Vestibular   Spontaneous Nystagmus (-) no evidence of nystagmus at rest in room light   Gaze Holding Nystagmus (-) no evidence of nystagmus   Coordination   Movements are Fluid and Coordinated 0   Coordination and Movement Description Incremental mobility, difficult to redirect and perform tasks of intention.   Bed Mobility   Rolling R 5  Supervision   Additional items Assist x 1;Bedrails;Increased time required;Verbal cues   Rolling L 5  Supervision   Additional items Assist x 1;Bedrails;Increased time required;Verbal cues   Supine to Sit Unable to assess   Additional Comments Repeated attempts made by PT/OT for additional functional task performance. However patient combative at times,unable to be redirected despite cues.Bed level only performed at this time.   Transfers   Sit to Stand Unable to assess   Ambulation/Elevation   Gait pattern Not tested   Balance   Static Sitting   (unable to attempt for grading)   Endurance Deficit   Endurance Deficit Yes   Activity Tolerance   Activity Tolerance Patient limited by fatigue;Other (Comment);Treatment limited secondary to agitation  (decreased engagement)   Medical Staff Made Aware Yes, CM was informed of d/c disposition recommendation.   Nurse Made Aware yes, Cee COLE   Assessment   Prognosis Poor   Problem List Decreased strength;Decreased endurance;Decreased mobility;Decreased coordination;Decreased cognition;Impaired judgement;Decreased safety awareness;Pain;Orthopedic restrictions   Assessment Pt is 58 y.o. male seen for PT evaluation s/p admit to  [facility]  on 3/1/2024 w/ Ambulatory dysfunction. PT consulted to assess pt's functional mobility and d/c needs. Order placed for PT eval  and tx order. Comorbidities affecting pt's physical performance at time of assessment include: ambulatory dysfunction,weakness, generalized convulsive epilepsy, sacral pressure ulcer,type 2 DM . PTA, pt was  receiving PAR at [facility] . Personal factors affecting pt at time of IE include: communication issues, inability to ambulate household distances, inability to navigate community distances, unable to perform dynamic tasks in community, decreased cognition, positive fall history, decreased initiation and engagement, impulsivity, limited insight into impairments, inability to perform ADLs, and combative . Please find objective findings from PT assessment regarding body systems outlined above with impairments and limitations including weakness, decreased ROM, decreased endurance, impaired coordination, pain, decreased activity tolerance, decreased functional mobility tolerance, decreased safety awareness, impaired judgement, fall risk, and decreased cognition. The following objective measures performed on IE also reveal limitations: AM-PAC 6-Clicks low functioning score of 6.From PT/mobility standpoint, recommendation at time of d/c would be of moderate resource intensity pending progress in order to facilitate return to PLOF.   Goals   Patient Goals patient could not provide a therapy-related goal due to cognitive impairment severity   LT Expiration Date 03/11/24   Long Term Goal #1 1.)Patient will complete bed mobility mod A of 1 for decrease need for caregiver assistance, decrease burden of care. 2.) Patient will complete transfers mod A of 1 to decrease risk of falls, facilitate upright standing posture. 3.) BLE strength to greater than/equal to 3+/5 gross musculature to increase ability to safely transfer, control descent to chair. 3.)Dynamic sitting balance to Fair in preparation of weight bearing transfers.   PT Treatment Day 0   Plan   Treatment/Interventions Functional transfer training;LE  strengthening/ROM;Elevations;Therapeutic exercise;Endurance training;Cognitive reorientation;Patient/family training;Equipment eval/education;Bed mobility;Spoke to nursing;Spoke to case management   PT Frequency Other (Comment)  (monitor)   Discharge Recommendation   Rehab Resource Intensity Level, PT II (Moderate Resource Intensity)   Additional Comments Upon conclusion, Jairon was resting in bed.The bed alarm was engaged.   AM-PAC Basic Mobility Inpatient   Turning in Flat Bed Without Bedrails 1   Lying on Back to Sitting on Edge of Flat Bed Without Bedrails 1   Moving Bed to Chair 1   Standing Up From Chair Using Arms 1   Walk in Room 1   Climb 3-5 Stairs With Railing 1   Basic Mobility Inpatient Raw Score 6   Turning Head Towards Sound 2   Follow Simple Instructions 1   Low Function Basic Mobility Raw Score  9   Low Function Basic Mobility Standardized Score  12.55   Highest Level Of Mobility   JH-HLM Goal 2: Bed activities/Dependent transfer   JH-HLM Achieved 2: Bed activities/Dependent transfer     History/Personal Factors/Comorbidities:  ambulatory dysfunction,weakness, generalized convulsive epilepsy, sacral pressure ulcer,type 2 DM    # of body structures/limitations: muscle weakness, activity intolerance,decreased endurance, impaired coordination, impaired cognition    Clinical presentation: unstable as seen in cognitive impairment severity, fall risk with positive fall history, progressive symptoms prior to hospitalization    Initial Assessment Time: 1007-9122    Sirena Devi, PT

## 2024-03-01 NOTE — ASSESSMENT & PLAN NOTE
Lab Results   Component Value Date    HGBA1C 5.5 11/27/2023       Recent Labs     02/29/24  0758 02/29/24  1207 02/29/24  1602 02/29/24  2142   POCGLU 177* 99 144* 112       Blood Sugar Average: Last 72 hrs:    Please on [facility] type II diet  Obtain Accu-Cheks before meals and at bedtime with Humalog correction dose before meals and at bedtime

## 2024-03-01 NOTE — DISCHARGE SUMMARY
[facility]  Discharge- Jairon Oconnell 1965, 58 y.o. male MRN: [id number]  Unit/Bed#: [facility] Encounter: [id number]  Primary Care Provider: HUNTER Brown   Date and time admitted to hospital: 2/27/2024  7:00 PM    * Ambulatory dysfunction  Assessment & Plan  Recent history reviewed, patient presented initially to our institution on February 4, 2024 after mechanical fall-sustained nondisplaced fracture of the greater trochanter of the right femur-followed by orthopedic surgery-nonoperative management-eventual discharge to skilled nursing facility on February 22, 2024  Patient presents this evening from [facility] nursing [facility] after an episode of agitation in which he broke chairs and became agitated. Patient unreliable historian  XR hip: Right hip fracture and osteonecrosis again noted (no change noted)  CT head without acute intracranial abnormality  PT/OT evaluation  Fall precautions  Plan for transfer back to [facility] for placement needs    Behavior concern in adult  Assessment & Plan  Unprovoked episode of agitation this evening at skilled nursing facility  Resume previous regimen: Seroquel 200mg BID and 400mg QHS, Gabapentin 400mg TID, Zonisemide 300mg HS  PRN zyprexa for agitation  Family declined Psychiatric consult  1:1 observation and posey belt removed today as patient remains less agitated    Nondisplaced fracture of greater trochanter of right femur, subsequent encounter for closed fracture with routine healing  Assessment & Plan  As above  PT/OT evaluation  CM for placement    Social problem  Assessment & Plan  Disposition planning may pose challenges given patient's psychiatric history and history of outburst    Acquired hypothyroidism  Assessment & Plan  Continue levothyroxine    Essential hypertension  Assessment & Plan  BP stable  Continue home lisinopril  Monitor vitals per routine    Hyperlipidemia  Assessment & Plan  Continue statin    Generalized  convulsive epilepsy (HCC)  Assessment & Plan  Continue home Keppra, Vimpat, Zonegran  Seizure precautions    Type 2 diabetes mellitus with diabetic neuropathy, without long-term current use of insulin (HCC)  Assessment & Plan  Lab Results   Component Value Date    HGBA1C 5.5 11/27/2023       Recent Labs     02/29/24  1207 02/29/24  1602 02/29/24  2142 03/01/24  0653   POCGLU 99 144* 112 96         Blood Sugar Average: Last 72 hrs:  (P) 131.625  Holding home metformin  Start on SSI with accu checks  Hypoglycemia protocol      Medical Problems       Resolved Problems  Date Reviewed: 3/1/2024   None       Discharging Physician / Practitioner: Loretta Funez PA-C  PCP: HUNTER Brown  Admission Date:   Admission Orders (From admission, onward)       Ordered        02/28/24 1526  Inpatient Admission  Once            02/27/24 2131  Place in Observation  Once                          Discharge Date: 03/01/24    Consultations During Hospital Stay:  None    Procedures Performed:   None    Significant Findings / Test Results:   CT head (2/27/24): No acute intracranial abnormality.   XR R hip/pelvis (2/27/24): Right hip fracture and osteonecrosis again noted.     Incidental Findings:   None     Test Results Pending at Discharge (will require follow up):   None     Outpatient Tests Requested:  None    Complications:  None    Reason for Admission: Ambulatory dysfunction    Hospital Course:   Jairon Oconnell is a 58 y.o. male patient who originally presented to the hospital on 2/27/2024 due to a behavioral outburst at his STR where he became aggressive with the staff. Patient's prior STR declining to take patient back after this. Patient requires ongoing STR due to previous femur fracture and coinciding ambulatory dysfunction. Psychiatry was to be consulted; however, family declined. Per chart review, patient had not been receiving his PRN medications at STR leading to the outburst. Patient was initially with posey belt  and 1:1 during hospitalization but after home medications resumed he became more cooperative and pleasant and all restraints removed. Patient had been admitted at [facility] for his ambulatory dysfunction and discharged to [facility] from there. Due to difficulty with placement needs and bed capacity it was agreed that patient to be transferred back to [facility] for ongoing placement needs. Family aware and agreeable to the plan.       Please see above list of diagnoses and related plan for additional information.     Condition at Discharge: stable    Discharge Day Visit / Exam:   * Please refer to separate progress note for these details *    Discussion with Family:  Patient was discharged in the middle of the night. Family member made aware of transfer the day prior.     Discharge instructions/Information to patient and family:   See after visit summary for information provided to patient and family.      Provisions for Follow-Up Care:  See after visit summary for information related to follow-up care and any pertinent home health orders.      Mobility at time of Discharge:   Basic Mobility Inpatient Raw Score: 6  JH-HLM Goal: 2: Bed activities/Dependent transfer  JH-HLM Achieved: 1: Laying in bed  HLM Goal NOT achieved. Continue to encourage mobility in post discharge setting.     Disposition:   Acute Care Hospital Transfer to [facility]    Planned Readmission: Yes     Discharge Statement:  I spent 55 minutes discharging the patient. This time was spent on the day of discharge. I had direct contact with the patient on the day of discharge. Greater than 50% of the total time was spent examining patient, answering all patient questions, arranging and discussing plan of care with patient as well as directly providing post-discharge instructions.  Additional time then spent on discharge activities.    Discharge Medications:  See after visit summary for reconciled discharge medications provided to patient and/or  family.      **Please Note: This note may have been constructed using a voice recognition system**

## 2024-03-01 NOTE — WOUND OSTOMY CARE
Consult Note - Wound   Jairon Oconnell 58 y.o. male MRN: [id number]  Unit/Bed#: -01 Encounter: [id number]      History and Present Illness:  Patient is 58 year old male admitted to [facility] with ambulatory dysfunction. Wound care consulted for sacral wound. Patient history significant for DDM2, left buttock I&D with VAC, cerebral palsy, HTN, moderate protein calorie-malnutrition. Patient lives in a group home. Patient is a recent discharge from [facility], was followed by wound care team during admission.    Assessment Findings:   Patient in bed for assessment, he is awake, alert and cooperative with assessment. He is able to turn for assessment without assistance, he is able to feed himself after set-up.      1- Scarring to the left lateral buttock from stage 3 pressure injury in the past. Scarring is intact and blanchable and hyperpigmented  2- Bilateral heels intact and blanchable   3- Scattered scarring from abrasions which are pink and epithelialized to the left lateral knee,  anterior knee, right anterior knee and left shoulder. Blanchable and intact    Skin and Wound Care Plan:   1- Ehob offloading cushion to chair when OOB  2- Elevate heels off the bed and while in the recliner with pillows to offload heels  3- Turn and reposition every two hours, use green wedges to assist with offloading  4- Allevyn life heel foams to bilateral heels, and bordered foam to the left buttock, sally with P, date, and peel back daily for skin assessment, change every 3 days or with soilage or dislodgement.  5- Skin nourishing cream daily    Wound:  Wound 02/04/24 Abrasion(s) Knee Anterior;Right (Active)       Wound 02/04/24 Abrasion(s) Knee Anterior;Left (Active)       Wound 02/06/24 Abrasion(s) Back Lateral;Upper;Left (Active)     Reviewed plan of care with primary RN Cee Garcia written as orders  Wound care team to sign off  Questions or concerns [facility] Wound Care Nurse    Charlene ROBBINSN, RN,  CWON

## 2024-03-01 NOTE — PLAN OF CARE
Problem: OCCUPATIONAL THERAPY ADULT  Goal: Performs self-care activities at highest level of function for planned discharge setting.  See evaluation for individualized goals.  Description: Treatment Interventions: ADL retraining, Functional transfer training, UE strengthening/ROM, Endurance training, Patient/family training, Equipment evaluation/education, Compensatory technique education, Energy conservation, Activityengagement    See flowsheet documentation for full assessment, interventions and recommendations.   3/1/2024 1237 by Abdulkadir Velasquez OT  Note: Limitation: Decreased ADL status, Decreased UE strength, Decreased Safe judgement during ADL, Decreased endurance, Decreased self-care trans, Decreased high-level ADLs, Decreased cognition  Prognosis: Fair  Assessment: Pt is a 58 y.o. male seen for OT evaluation s/p admit to [facility] on 3/1/2024 w/ Ambulatory dysfunction. Commorbidities affecting patient's functional performance at time of assessment include: DM2, generlized convulsive epilepsy, HLD, HTN, hypothyroidism, CP, and nondisplaced fx of R femur. Personal factors affecting pt at time of IE include:limited home support, difficulty performing ADLS, limited insight into deficits, and decreased initiation and engagement . Prior to admission, pt required A with ADLs. Upon evaluation: the following deficits impact occupational performance: decreased strength, decreased balance, decreased tolerance, impaired attention, impaired initiation, impaired memory, impaired sequencing, impaired problem solving, decreased safety awareness, and decreased coping skills. Pt to benefit from continued skilled OT tx while in the hospital to address deficits as defined above and maximize level of functional independence w ADL's and functional mobility. Occupational Performance areas to address include: bathing/shower, toilet hygiene, dressing, functional mobility, and clothing management. From OT standpoint, recommendation  at time of d/c would be Level II (Moderate Resource Intensity.     Rehab Resource Intensity Level, OT: II (Moderate Resource Intensity)     Abdulkadir Velasquez MS, OTR/L

## 2024-03-01 NOTE — ASSESSMENT & PLAN NOTE
Lab Results   Component Value Date    HGBA1C 5.5 11/27/2023       Recent Labs     02/29/24  1207 02/29/24  1602 02/29/24  2142 03/01/24  0653   POCGLU 99 144* 112 96         Blood Sugar Average: Last 72 hrs:  (P) 131.625  Holding home metformin  Start on SSI with accu checks  Hypoglycemia protocol

## 2024-03-01 NOTE — ASSESSMENT & PLAN NOTE
Admit to medicine  Patient was previously discharged from this facility to skilled nursing facility where he had an episode of agitation in which he broke a chair and became agitated   Patient has nonoperative right hip greater trochanter fracture was seen and evaluated by orthopedic surgery previous admission  Patient was seen and evaluated by PT OT medically cleared for discharge to skilled nursing facility  While at facility patient had an episode agitation and broken chair was aggressive with staff and they are refusing to take him back  Will consult case management for assistance in placement  Consult PT OT in a.m.

## 2024-03-01 NOTE — ASSESSMENT & PLAN NOTE
Recent history reviewed, patient presented initially to our institution on February 4, 2024 after mechanical fall-sustained nondisplaced fracture of the greater trochanter of the right femur-followed by orthopedic surgery-nonoperative management-eventual discharge to skilled nursing facility on February 22, 2024  Patient presents this evening from skilled nursing facility after an episode of agitation in which he broke chairs and became agitated. Patient unreliable historian  XR hip: Right hip fracture and osteonecrosis again noted (no change noted)  CT head without acute intracranial abnormality  PT/OT evaluation  Fall precautions  Plan for transfer back to [facility] for placement needs

## 2024-03-01 NOTE — ASSESSMENT & PLAN NOTE
Continue previously prescribed Depakote DR 1000 mg p.o. nightly and 750 mg p.o. daily, Neurontin 400 mg p.o. 3 times daily, Vimpat 150 mg p.o. daily and 200 mg p.o. nightly and Keppra 1500 mg p.o. every 12 hours  Placed on seizure precautions

## 2024-03-01 NOTE — PROGRESS NOTES
[facility]  Progress Note  Name: Jairon Oconnell I  MRN: [id number]  Unit/Bed#: -01 I Date of Admission: 3/1/2024   Date of Service: 3/1/2024 I Hospital Day: 0    Assessment/Plan   * Ambulatory dysfunction  Assessment & Plan    Patient was previously discharged from this facility to skilled nursing facility where he had an episode of agitation in which he broke a chair and became agitated   Patient has nonoperative right hip greater trochanter fracture was seen and evaluated by orthopedic surgery previous admission  Patient was seen and evaluated by PT OT medically cleared for discharge to skilled nursing facility  While at facility patient had an episode agitation and broken chair was aggressive with staff and they are refusing to take him back  Will consult case management for assistance in placement  Follow-up PT OT.  Will discuss with case management.    Type 2 diabetes mellitus with diabetic neuropathy, without long-term current use of insulin (HCC)  Assessment & Plan  Lab Results   Component Value Date    HGBA1C 5.5 11/27/2023       Recent Labs     02/29/24  1207 02/29/24  1602 02/29/24  2142 03/01/24  0653   POCGLU 99 144* 112 96         Blood Sugar Average: Last 72 hrs:  Continue diabetic diet   Monitor Accu-Cheks before meals and at bedtime    Generalized convulsive epilepsy (HCC)  Assessment & Plan  Continue previously prescribed Depakote DR 1000 mg p.o. nightly and 750 mg p.o. daily, Neurontin 400 mg p.o. 3 times daily, Vimpat 150 mg p.o. daily and 200 mg p.o. nightly and Keppra 1500 mg p.o. every 12 hours  Placed on seizure precautions    Essential hypertension  Assessment & Plan  Continue previously prescribed lisinopril 10 mg p.o. daily    Acquired hypothyroidism  Assessment & Plan  Continue prehospital levothyroxine 150 mcg p.o. every morning    Hyperlipidemia  Assessment & Plan  Continue previously prescribed Pravachol 80 mg p.o. nightly    Pressure ulcer of sacral  region, stage 3 (HCC)  Assessment & Plan  Consult wound care in a.m.             VTE Pharmacologic Prophylaxis:   Moderate Risk (Score 3-4) - Pharmacological DVT Prophylaxis Ordered: enoxaparin (Lovenox).    Mobility:   Basic Mobility Inpatient Raw Score: 6  JH-HLM Goal: 2: Bed activities/Dependent transfer  JH-HLM Achieved: 2: Bed activities/Dependent transfer  HLM Goal achieved. Continue to encourage appropriate mobility.    Current Length of Stay: 0 day(s)  Current Patient Status: Inpatient   Certification Statement: The patient will continue to require additional inpatient hospital stay due to behavior issuers  Discharge Plan:  Anticipate prolonged hospital stay due to social circumstances./Social determinants of health.    Code Status: Level 1 - Full Code    Subjective:   No acute issues    Objective:     Vitals:   Temp (24hrs), Av.3 °F (36.3 °C), Min:96.8 °F (36 °C), Max:97.7 °F (36.5 °C)    Temp:  [96.8 °F (36 °C)-97.7 °F (36.5 °C)] 97.7 °F (36.5 °C)  HR:  [77-83] 77  Resp:  [16-18] 18  BP: ()/(63-83) 128/83  SpO2:  [93 %-97 %] 97 %  Body mass index is 19.88 kg/m².     Input and Output Summary (last 24 hours):     Intake/Output Summary (Last 24 hours) at 3/1/2024 1039  Last data filed at 3/1/2024 0744  Gross per 24 hour   Intake 120 ml   Output --   Net 120 ml       Physical Exam:   Physical Exam  Vitals and nursing note reviewed.   HENT:      Head: Normocephalic and atraumatic.      Right Ear: External ear normal.      Left Ear: External ear normal.   Cardiovascular:      Rate and Rhythm: Normal rate.      Pulses: Normal pulses.   Pulmonary:      Effort: Pulmonary effort is normal. No respiratory distress.      Breath sounds: Normal breath sounds. No wheezing.   Musculoskeletal:         General: Normal range of motion.      Cervical back: Normal range of motion.   Skin:     General: Skin is warm.   Neurological:      Mental Status: He is alert and oriented to person, place, and time. Mental status  is at baseline.   Psychiatric:         Mood and Affect: Mood normal.         Behavior: Behavior normal.         Thought Content: Thought content normal.         Judgment: Judgment normal.          Additional Data:     Labs:  Results from last 7 days   Lab Units 02/29/24 0617 02/28/24 0603 02/27/24 2031   WBC Thousand/uL 6.96   < > 7.95   HEMOGLOBIN g/dL 12.3   < > 12.6   HEMATOCRIT % 36.3*   < > 36.6   PLATELETS Thousands/uL 173   < > 184   NEUTROS PCT %  --   --  46   LYMPHS PCT %  --   --  35   MONOS PCT %  --   --  12   EOS PCT %  --   --  5    < > = values in this interval not displayed.     Results from last 7 days   Lab Units 02/29/24  0617 02/28/24 0603 02/27/24 2031   SODIUM mmol/L 138   < > 139   POTASSIUM mmol/L 4.1   < > 4.2   CHLORIDE mmol/L 103   < > 104   CO2 mmol/L 29   < > 28   BUN mg/dL 24   < > 24   CREATININE mg/dL 0.81   < > 0.61   ANION GAP mmol/L 6   < > 7   CALCIUM mg/dL 9.1   < > 9.3   ALBUMIN g/dL  --   --  4.1   TOTAL BILIRUBIN mg/dL  --   --  0.22   ALK PHOS U/L  --   --  66   ALT U/L  --   --  17   AST U/L  --   --  17   GLUCOSE RANDOM mg/dL 131   < > 83    < > = values in this interval not displayed.         Results from last 7 days   Lab Units 03/01/24  0653 02/29/24  2142 02/29/24  1602 02/29/24  1207 02/29/24  0758 02/28/24  2058 02/28/24  1640 02/28/24  1110   POC GLUCOSE mg/dl 96 112 144* 99 177* 156* 83 186*               Lines/Drains:  Invasive Devices       Peripheral Intravenous Line  Duration             Peripheral IV 02/27/24 Dorsal (posterior);Left Forearm 2 days              Drain  Duration             Urethral Catheter Double-lumen 16 Fr. 1 day                  Urinary Catheter:  Goal for removal: Voiding trial when ambulation improves                 Recent Cultures (last 7 days):         Last 24 Hours Medication List:   Current Facility-Administered Medications   Medication Dose Route Frequency Provider Last Rate    acetaminophen  650 mg Oral Q6H PRN Hasmukh Becker  PA-C      divalproex sodium  1,000 mg Oral HS Hasmukh Becker, PA-C      divalproex sodium  750 mg Oral Daily Hasmukhagatha Becker, PA-C      docusate sodium  100 mg Oral BID PRN Hasmukhagatha Becker, PA-C      enoxaparin  40 mg Subcutaneous Daily Hasmukh Becker, PA-C      gabapentin  400 mg Oral TID Hasmukh Becker, PA-C      ibuprofen  600 mg Oral Q6H PRN Hasmukhagatha Becker, PA-C      insulin lispro  1-5 Units Subcutaneous TID AC Hasmukh Becker, PA-C      insulin lispro  1-6 Units Subcutaneous HS Hasmukh Rosita, PA-C      lacosamide  150 mg Oral Daily Hasmukh Becker, PA-C      lacosamide  200 mg Oral HS Hasmukh Becker, PA-C      levETIRAcetam  1,500 mg Oral Q12H RONALD Hasmukh Becker, PA-C      levothyroxine  150 mcg Oral Daily Hasmukh Becker, PA-C      lisinopril  10 mg Oral Daily Hasmukh Becker, PA-C      loratadine  10 mg Oral Daily Hasmukh Becker, PA-C      multivitamin stress formula  1 tablet Oral Daily Hasmukh Becker, PA-C      OLANZapine  5 mg Intramuscular Q3H PRN Hasmukh Becker, PA-C      pravastatin  80 mg Oral Daily With Dinner Hasmukh Becker, PA-C      QUEtiapine  200 mg Oral BID Hasmukh Becker, PA-C      QUEtiapine  400 mg Oral HS Hasmukh Becker, PA-C      temazepam  15 mg Oral HS PRN Hasmukh Becker, PA-C      zonisamide  300 mg Oral HS Hasmukh Becker, PA-C          Today, Patient Was Seen By: Rick De La Cruz DO    **Please Note: This note may have been constructed using a voice recognition system.**

## 2024-03-01 NOTE — ASSESSMENT & PLAN NOTE
Patient was previously discharged from this facility to skilled nursing facility where he had an episode of agitation in which he broke a chair and became agitated   Patient has nonoperative right hip greater trochanter fracture was seen and evaluated by orthopedic surgery previous admission  Patient was seen and evaluated by PT OT medically cleared for discharge to skilled nursing facility  While at facility patient had an episode agitation and broken chair was aggressive with staff and they are refusing to take him back  Will consult case management for assistance in placement  Follow-up PT OT.  Will discuss with case management.
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
## 2024-03-01 NOTE — ASSESSMENT & PLAN NOTE
Lab Results   Component Value Date    HGBA1C 5.5 11/27/2023       Recent Labs     02/29/24  1207 02/29/24  1602 02/29/24  2142 03/01/24  0653   POCGLU 99 144* 112 96         Blood Sugar Average: Last 72 hrs:  Continue diabetic diet   Monitor Accu-Cheks before meals and at bedtime

## 2024-03-01 NOTE — PLAN OF CARE
Problem: Knowledge Deficit  Goal: Patient/family/caregiver demonstrates understanding of disease process, treatment plan, medications, and discharge instructions  Description: Complete learning assessment and assess knowledge base.  Interventions:  - Provide teaching at level of understanding  - Provide teaching via preferred learning methods  Outcome: Progressing     Problem: Prexisting or High Potential for Compromised Skin Integrity  Goal: Skin integrity is maintained or improved  Description: INTERVENTIONS:  - Identify patients at risk for skin breakdown  - Assess and monitor skin integrity  - Assess and monitor nutrition and hydration status  - Monitor labs   - Assess for incontinence   - Turn and reposition patient  - Assist with mobility/ambulation  - Relieve pressure over bony prominences  - Avoid friction and shearing  - Provide appropriate hygiene as needed including keeping skin clean and dry  - Evaluate need for skin moisturizer/barrier cream  - Collaborate with interdisciplinary team   - Patient/family teaching  - Consider wound care consult   Outcome: Progressing

## 2024-03-01 NOTE — PLAN OF CARE
Problem: PAIN - ADULT  Goal: Verbalizes/displays adequate comfort level or baseline comfort level  Description: Interventions:  - Encourage patient to monitor pain and request assistance  - Assess pain using appropriate pain scale  - Administer analgesics based on type and severity of pain and evaluate response  - Implement non-pharmacological measures as appropriate and evaluate response  - Consider cultural and social influences on pain and pain management  - Notify physician/advanced practitioner if interventions unsuccessful or patient reports new pain  Outcome: Not Progressing     Problem: Knowledge Deficit  Goal: Patient/family/caregiver demonstrates understanding of disease process, treatment plan, medications, and discharge instructions  Description: Complete learning assessment and assess knowledge base.  Interventions:  - Provide teaching at level of understanding  - Provide teaching via preferred learning methods  Outcome: Not Progressing

## 2024-03-01 NOTE — PLAN OF CARE
Problem: PHYSICAL THERAPY ADULT  Goal: Performs mobility at highest level of function for planned discharge setting.  See evaluation for individualized goals.  Description: Treatment/Interventions: Functional transfer training, LE strengthening/ROM, Elevations, Therapeutic exercise, Endurance training, Cognitive reorientation, Patient/family training, Equipment eval/education, Bed mobility, Spoke to nursing, Spoke to case management          See flowsheet documentation for full assessment, interventions and recommendations.  Note: Prognosis: Poor  Problem List: Decreased strength, Decreased endurance, Decreased mobility, Decreased coordination, Decreased cognition, Impaired judgement, Decreased safety awareness, Pain, Orthopedic restrictions  Assessment: Pt is 58 y.o. male seen for PT evaluation s/p admit to  [facility]  on 3/1/2024 w/ Ambulatory dysfunction. PT consulted to assess pt's functional mobility and d/c needs. Order placed for PT eval and tx order. Comorbidities affecting pt's physical performance at time of assessment include: ambulatory dysfunction,weakness, generalized convulsive epilepsy, sacral pressure ulcer,type 2 DM . PTA, pt was  receiving PAR at [facility] . Personal factors affecting pt at time of IE include: communication issues, inability to ambulate household distances, inability to navigate community distances, unable to perform dynamic tasks in community, decreased cognition, positive fall history, decreased initiation and engagement, impulsivity, limited insight into impairments, inability to perform ADLs, and combative . Please find objective findings from PT assessment regarding body systems outlined above with impairments and limitations including weakness, decreased ROM, decreased endurance, impaired coordination, pain, decreased activity tolerance, decreased functional mobility tolerance, decreased safety awareness, impaired judgement, fall risk, and decreased  cognition. The following objective measures performed on IE also reveal limitations: AM-PAC 6-Clicks low functioning score of 6.From PT/mobility standpoint, recommendation at time of d/c would be of moderate resource intensity pending progress in order to facilitate return to PLOF.        Rehab Resource Intensity Level, PT: II (Moderate Resource Intensity)    See flowsheet documentation for full assessment.

## 2024-03-01 NOTE — OCCUPATIONAL THERAPY NOTE
Occupational Therapy Evaluation      Jairon MADERA Kourtney    3/1/2024    Principal Problem:    Ambulatory dysfunction  Active Problems:    Type 2 diabetes mellitus with diabetic neuropathy, without long-term current use of insulin (HCC)    Generalized convulsive epilepsy (HCC)    Hyperlipidemia    Essential hypertension    Acquired hypothyroidism    Pressure ulcer of sacral region, stage 3 ([facility])      Past Medical History:   Diagnosis Date    ADRIANA (acute kidney injury) ([facility]) 9/24/2019    Bacteremia due to Gram-positive bacteria 9/7/2021    Buttock wound, left, subsequent encounter 11/5/2021    COVID-19     CP (cerebral palsy) ([facility])     Depression     Diabetes ([facility])     Disease of thyroid gland     Gait disorder     Gluteal abscess 9/6/2021    Hyperlipidemia     Hypertension     Kidney failure     Kidney stones     Moderate protein-calorie malnutrition ([facility]) 12/22/2021    Osteoporosis     Pressure injury of left buttock, stage 4 ([facility]) 3/9/2022    Psychiatric disorder     Scoliosis     Seizures ([facility])     Sepsis ([facility]) 9/25/2019    Thyroid disease     UTI (urinary tract infection)        Past Surgical History:   Procedure Laterality Date    APPENDECTOMY      IR PICC PLACEMENT SINGLE LUMEN  9/13/2021    IR PICC PLACEMENT SINGLE LUMEN  9/16/2021 03/01/24 0934   OT Last Visit   OT Visit Date 03/01/24   Note Type   Note type Evaluation   Pain Assessment   Pain Assessment Tool FLACC   Pain Rating: FLACC (Rest) - Face 0   Pain Rating: FLACC (Rest) - Legs 0   Pain Rating: FLACC (Rest) - Activity 0   Pain Rating: FLACC (Rest) - Cry 0   Pain Rating: FLACC (Rest) - Consolability 0   Score: FLACC (Rest) 0   Pain Rating: FLACC (Activity) - Face 1   Pain Rating: FLACC (Activity) - Legs 1   Pain Rating: FLACC (Activity) - Activity 1   Pain Rating: FLACC (Activity) - Cry 1   Pain Rating: FLACC (Activity) - Consolability 1   Score: FLACC (Activity) 5   Restrictions/Precautions   Weight Bearing Precautions Per Order Yes   RLE Weight  Bearing Per Order (S)  WBAT  (no active hip ABduction as per recent hip surgery)   Other Precautions Impulsive;Cognitive;Bed Alarm;Combative;WBS;Seizure;Fall Risk;Pain   Home Living   Type of Home Group Home  (Pt originally from group home, recently was in [facility] for PAR)   Home Layout Two level;Performs ADLs on one level;Able to live on main level with bedroom/bathroom;Access  (0 [email or web address])   Bathroom Equipment Shower chair   Bathroom Accessibility Accessible   Home Equipment Wheelchair-manual   Additional Comments pt is poor historian and unable to provide PLOF/social history. Information obtained from previous admission in chart.   Prior Function   Level of [place] Needs assistance with ADLs;Needs assistance with functional mobility;Needs assistance with IADLS   Lives With Facility staff   Receives Help From Personal care attendant   IADLs Family/Friend/Other provides transportation;Family/Friend/Other provides meals;Family/Friend/Other provides medication management   Falls in the last 6 months 1 to 4  (pt has + fall history per chart review)   ADL   UB Bathing Assistance 3  Moderate Assistance   LB Bathing Assistance 2  Maximal Assistance   UB Dressing Assistance 2  Maximal Assistance   LB Dressing Assistance 1  Total Assistance   Bed Mobility   Rolling R 5  Supervision   Additional items Assist x 1;Bedrails;Increased time required;Verbal cues   Supine to Sit Unable to assess  (Pt declined to complete at this time)   Additional Comments Repeated attempts made by PT/OT for additional functional task performance. However patient combative at times,unable to be redirected despite cues.   Balance   Static Sitting   (unable to assess for grading)   Activity Tolerance   Activity Tolerance Patient limited by fatigue;Other (Comment);Treatment limited secondary to agitation  (decreased engagement)   Medical Staff Made Aware CM notified   Nurse Made Aware DOUGLAS DIAZ Assessment   RUE Assessment X  (~90 degree  AAROM; unable to formally MMT d/t cognitive impairment)   LUE Assessment   LUE Assessment X  (~90 degree AAROM; unable to formally MMT d/t cognitive impairment)   Cognition   Overall Cognitive Status Impaired   Arousal/Participation Uncooperative   Attention Difficulty attending to directions   Orientation Level Unable to assess   Memory Unable to assess   Following Commands Follows one step commands inconsistently   Assessment   Limitation Decreased ADL status;Decreased UE strength;Decreased Safe judgement during ADL;Decreased endurance;Decreased self-care trans;Decreased high-level ADLs;Decreased cognition   Prognosis Fair   Assessment Pt is a 58 y.o. male seen for OT evaluation s/p admit to [facility] on 3/1/2024 w/ Ambulatory dysfunction. Commorbidities affecting patient's functional performance at time of assessment include: DM2, generlized convulsive epilepsy, HLD, HTN, hypothyroidism, CP, and nondisplaced fx of R femur. Personal factors affecting pt at time of IE include:limited home support, difficulty performing ADLS, limited insight into deficits, and decreased initiation and engagement . Prior to admission, pt required A with ADLs. Upon evaluation: the following deficits impact occupational performance: decreased strength, decreased balance, decreased tolerance, impaired attention, impaired initiation, impaired memory, impaired sequencing, impaired problem solving, decreased safety awareness, and decreased coping skills. Pt to benefit from continued skilled OT tx while in the hospital to address deficits as defined above and maximize level of functional independence w ADL's and functional mobility. Occupational Performance areas to address include: bathing/shower, toilet hygiene, dressing, functional mobility, and clothing management. From OT standpoint, recommendation at time of d/c would be Level II (Moderate Resource Intensity.   Goals   Patient Goals patient could not provide a therapy-related goal  due to cognitive impairment severity   Plan   Treatment Interventions ADL retraining;Functional transfer training;UE strengthening/ROM;Endurance training;Patient/family training;Equipment evaluation/education;Compensatory technique education;Energy conservation;Activityengagement   Goal Expiration Date 03/11/24   OT Treatment Day 0   OT Frequency 3-5x/wk   Discharge Recommendation   Rehab Resource Intensity Level, OT II (Moderate Resource Intensity)   Additional Comments  Pt seen as a co-eval with PT due to the patient's co-morbidities, clinically unstable presentation, and present impairments which are a regression from the patient's baseline.   Additional Comments 2 The patient's raw score on the -PAC Daily Activity Inpatient Short Form is 14. A raw score of less than 19 suggests the patient may benefit from discharge to post-acute rehabilitation services. Please refer to the recommendation of the Occupational Therapist for safe discharge planning.   AM-PAC Daily Activity Inpatient   Lower Body Dressing 1   Bathing 1   Toileting 1   Upper Body Dressing 2   Grooming 2   Eating 2   Daily Activity Raw Score 9   Turning Head Towards Sound 3   Follow Simple Instructions 2   Low Function Daily Activity Raw Score 14   Low Function Daily Activity Standardized Score  24.79   AM-[facility] Applied Cognition Inpatient   Following a Speech/Presentation 2   Understanding Ordinary Conversation 2   Taking Medications 1   Remembering Where Things Are Placed or Put Away 1   Remembering List of 4-5 Errands 1   Taking Care of Complicated Tasks 1   Applied Cognition Raw Score 8   Applied Cognition Standardized Score 19.32     GOALS:    Pt will achieve the following within specified time frame: STG  Pt will achieve the following goals within 5 days    *Self Feeding- (S) for inc'd independence with providing self nourishment    *UB ADL with Min (A) for inc'd independence with self cares    *LB ADL with Max (A) using AE prn for inc'd  independence with self cares    *Toileting with Max (A) for clothing management and hygiene for return to PLOF with personal care    *Increase static sitting balance to P+ and dyn sitting balance to P for inc'd safety with sitting purposeful tasks    *Increase sitting tolerance x1 m for inc'd tolerance with sitting purposeful tasks    *Participate in 10m UE therex to increase overall stamina/activity tolerance for purposeful tasks    *Bed mobility- (S) for inc'd independence to manage own comfort and initiate EOB & OOB purposeful tasks    Pt will achieve the following within specified time frame: LTG  Pt will achieve the following goals within 10 days    *ADL transfers with Max (A) for inc'd independence with ADLs/purposeful tasks    *Self Feeding- (I) for inc'd independence with providing self nourishment    *UB ADL with CGA for inc'd independence with self cares    *LB ADL with Mod (A) using AE prn for inc'd independence with self cares    *Toileting with Mod (A) for clothing management and hygiene for return to PLOF with personal care    *Increase static stand balance to P+ and dyn stand balance to P for inc'd safety with standing purposeful tasks    *Increase stand tolerance x1 m for inc'd tolerance with standing purposeful tasks    *Bed mobility- (I) for inc'd independence to manage own comfort and initiate EOB & OOB purposeful tasks      Abdulkadir Velasquez MS, OTR/L

## 2024-03-01 NOTE — H&P
[facility]  H&P  Name: Jairon Oconnell 58 y.o. male I MRN: [id number]  Unit/Bed#: [facility]-01 I Date of Admission: 3/1/2024   Date of Service: 3/1/2024 I Hospital Day: 0      Assessment/Plan   * Ambulatory dysfunction  Assessment & Plan  Admit to medicine  Patient was previously discharged from this facility to skilled nursing facility where he had an episode of agitation in which he broke a chair and became agitated   Patient has nonoperative right hip greater trochanter fracture was seen and evaluated by orthopedic surgery previous admission  Patient was seen and evaluated by PT OT medically cleared for discharge to skilled nursing facility  While at facility patient had an episode agitation and broken chair was aggressive with staff and they are refusing to take him back  Will consult case management for assistance in placement  Consult PT OT in a.m.    Pressure ulcer of sacral region, stage 3 (HCC)  Assessment & Plan  Consult wound care in a.m.    Acquired hypothyroidism  Assessment & Plan  Continue prehospital levothyroxine 150 mcg p.o. every morning    Essential hypertension  Assessment & Plan  Continue previously prescribed lisinopril 10 mg p.o. daily    Hyperlipidemia  Assessment & Plan  Continue previously prescribed Pravachol 80 mg p.o. nightly    Generalized convulsive epilepsy (HCC)  Assessment & Plan  Continue previously prescribed Depakote DR 1000 mg p.o. nightly and 750 mg p.o. daily, Neurontin 400 mg p.o. 3 times daily, Vimpat 150 mg p.o. daily and 200 mg p.o. nightly and Keppra 1500 mg p.o. every 12 hours  Placed on seizure precautions    Type 2 diabetes mellitus with diabetic neuropathy, without long-term current use of insulin (HCC)  Assessment & Plan  Lab Results   Component Value Date    HGBA1C 5.5 11/27/2023       Recent Labs     02/29/24  0758 02/29/24  1207 02/29/24  1602 02/29/24  2142   POCGLU 177* 99 144* 112       Blood Sugar Average: Last 72 hrs:    Please on [facility]  "type II diet  Obtain Accu-Cheks before meals and at bedtime with Humalog correction dose before meals and at bedtime         VTE Prophylaxis: Enoxaparin (Lovenox)  Code Status: Level 1  Discussion with family: None present at bedside at time of exam    Anticipated Length of Stay:  Patient will be admitted on an Inpatient basis with an anticipated length of stay of  > 2 midnights.   Justification for Hospital Stay: Ambulatory dysfunction prior PT OT and case management    Total Time for Visit, including Counseling / Coordination of Care: 1 hour.  Greater than 50% of this total time spent on direct patient counseling and coordination of care.    Chief Complaint:   Right hip pain    History of Present Illness:    Jairon Oconnell is a 58 y.o. male who presents with right hip pain.  Patient was previously seen and admitted to our facility to [facility] for right greater trochanter femur fracture was seen and evaluated by PT OT as well as orthopedics during that admission.  Patient was deemed medically cleared for discharge to skilled nursing facility yesterday had an outburst he became violent with staff and broke a chair.  Patient was originally admitted to [facility] subsequently transferred here for \"buy back\" as patient is going to be a difficult placement issue due to his fracture as well as behavioral issues.    At time of exam patient is a very poor historian, he is not able to provide any real history and history is obtained through review of his records.    Review of Systems:    Review of Systems   Unable to perform ROS: Psychiatric disorder       Past Medical and Surgical History:     Past Medical History:   Diagnosis Date    ADRIANA (acute kidney injury) ([facility]) 9/24/2019    Bacteremia due to Gram-positive bacteria 9/7/2021    Buttock wound, left, subsequent encounter 11/5/2021    COVID-19     CP (cerebral palsy) ([facility])     Depression     Diabetes ([facility])     Disease of thyroid gland     Gait disorder     Gluteal abscess " 9/6/2021    Hyperlipidemia     Hypertension     Kidney failure     Kidney stones     Moderate protein-calorie malnutrition (HCC) 12/22/2021    Osteoporosis     Pressure injury of left buttock, stage 4 (HCC) 3/9/2022    Psychiatric disorder     Scoliosis     Seizures ([facility])     Sepsis ([facility]) 9/25/2019    Thyroid disease     UTI (urinary tract infection)        Past Surgical History:   Procedure Laterality Date    APPENDECTOMY      IR PICC PLACEMENT SINGLE LUMEN  9/13/2021    IR PICC PLACEMENT SINGLE LUMEN  9/16/2021       Meds/Allergies:    Prior to Admission medications    Medication Sig Start Date End Date Taking? Authorizing Provider   benztropine (COGENTIN) 0.5 mg tablet Take 1 tablet (0.5 mg total) by mouth 2 (two) times a day 1/30/24   HUNTER Brown   Blood Glucose Monitoring Suppl (ONETOUCH VERIO) w/Device KIT by Does not apply route 3 (three) times a day TEST BLOOD SUGARS THREE TIMES  Patient not taking: Reported on 7/7/2023 7/31/20   HUNTER Fitzgerald   calcium carbonate (OYSTER SHELL,OSCAL) 500 mg Take 1 tablet by mouth 3 (three) times a day 1/30/24   HUNTER Brown   cholecalciferol (VITAMIN D3) 1,000 units tablet Take 1 tablet (1,000 Units total) by mouth daily 1/30/24   HUNTER Brown   clonazePAM (KlonoPIN) 0.25 MG disintegrating tablet Take 1 tablet (0.25 mg total) by mouth as needed for seizures (clusters of myoclonic jerking (more than 2 myoclonic jerks in a day)) for up to 7 doses 2/22/24   HUNTER Babcock   Depakote 500 MG DR tablet Take 1 tab in the morning and 2 tabs at night. Brand necessary 1/24/24   Keyur Banerjee MD   Depakote  MG 24 hr tablet Take 1 tablet (250 mg total) by mouth daily Take in the morning with one 500 mg tablet (total morning dose of 750 mg). BRAND ONLY 1/24/24   Keyur Banerjee MD   Disposable Gloves (Safe-Sense Glove-Blk-Nitrl-L) MISC Use 1 each 3 (three) times a day as needed (care taker assisting with soiled briefs)  Patient not  taking: Reported on 9/28/2023 7/26/23   HUNTER Brown   docusate sodium (COLACE) 100 mg capsule Take 1 capsule (100 mg total) by mouth 2 (two) times a day as needed for constipation 1/30/24   HUNTER Brown   gabapentin (NEURONTIN) 400 mg capsule Take 1 capsule (400 mg total) by mouth 3 (three) times a day 2/22/24   HUNTER Babcock   hydrOXYzine HCL (ATARAX) 10 mg tablet Take 1 tablet (10 mg total) by mouth 2 (two) times a day 2/22/24   HUNTER Babcock   ibuprofen (MOTRIN) 600 mg tablet Take 600 mg by mouth every 6 (six) hours as needed for mild pain    Historical Provider, MD   Incontinence Supply Disposable (Briefs Overnight Medium) MISC Use in the morning  Patient not taking: Reported on 9/28/2023 7/26/23   HUNTER Brown   lacosamide (VIMPAT) 150 mg tablet Take 1 tab (150 mg) in the morning. 2/22/24   HUNTER Babcock   lacosamide (VIMPAT) 200 mg tablet Take 1 tab (200 mg) at night. 2/22/24   HUNTER Babcock   levETIRAcetam (KEPPRA) 750 mg tablet GIVE 2 TABLETS BY MOUTH TWICE DAILY (=1500MG) FOR SEIZURE DISORDER DR. KEYUR BANERJEE 1/24/24   Keyur Banerjee MD   levothyroxine 150 mcg tablet Take 1 tablet (150 mcg total) by mouth daily 1/30/24   HUNTER Brown   lisinopril (ZESTRIL) 10 mg tablet Take 1 tablet (10 mg total) by mouth daily 1/30/24   HUNTER Brown   loratadine (CLARITIN) 10 mg tablet Take 10 mg by mouth daily    Historical Provider, MD   metFORMIN (GLUCOPHAGE) 1000 MG tablet Take 1 tablet (1,000 mg total) by mouth 2 (two) times a day with meals 2/26/24   HUNTER Brown   Multiple Vitamin (Daily-Reina) TABS Take 1 tablet by mouth daily Take at 8 AM 4/3/23   HUNTER Brownuch Delica Lancets 33G MISC by Does not apply route daily TEST BLOOD SUGARS THREE TIMES DAILY  Patient not taking: Reported on 1/24/2024 7/31/20   HUNTER Fitzgerald   QUEtiapine (SEROquel XR) 200 mg 24 hr tablet TAKE 1 TABLET BY MOUTH TWICE A  DAY (8AM,2PM) 7/26/23   HUNTER Brown   QUEtiapine (SEROquel XR) 400 mg 24 hr tablet TAKE 1 TABLET BY MOUTH AT BEDTIME (8PM) (DX: ANTIPSYCHOTIC) 3/28/22   HUNTER Brown   simvastatin (ZOCOR) 40 mg tablet Take 1 tablet (40 mg total) by mouth daily at bedtime 2/26/24   HUNTER Brown   temazepam (RESTORIL) 15 mg capsule Take 1 capsule (15 mg total) by mouth daily at bedtime as needed for sleep 3/28/22   HUNTER Brown   vitamin B-12 (VITAMIN B-12) 1,000 mcg tablet Take 1 tablet (1,000 mcg total) by mouth daily  Patient not taking: Reported on 1/5/2024 7/26/23   HUNTER Brown   zonisamide (ZONEGRAN) 100 mg capsule Take 4 capsules (400 mg) nightly.  Patient taking differently: No sig reported 11/17/23   Keyur Banerjee MD     all medications and allergies reviewed    Allergies:   Allergies   Allergen Reactions    Depakote [Valproic Acid] Other (See Comments)     Must have brand name Depakote    Erythromycin Other (See Comments)     Unknown per pt    Penicillins Other (See Comments)     Unknown per pt       Social History:     Marital Status: Single   Occupation: Disabled  Patient Pre-hospital Living Situation: Previously residing at skilled nursing facility  Patient Pre-hospital Level of Mobility: Limited  Patient Pre-hospital Diet Restrictions: Diabetic    Social History     Substance and Sexual Activity   Alcohol Use Never     Social History     Tobacco Use   Smoking Status Never   Smokeless Tobacco Never     Social History     Substance and Sexual Activity   Drug Use No       Family History:  I have reviewed the patient's family history    Physical Exam:     Vitals:   Blood Pressure: 108/63 (03/01/24 0216)  Pulse: 83 (03/01/24 0216)  Temperature: (!) 96.8 °F (36 °C) (03/01/24 0216)  Temp Source: Axillary (03/01/24 0216)  Respirations: 18 (03/01/24 0216)  Height: 6' (182.9 cm) (03/01/24 0216)  SpO2: 93 % (03/01/24 0216)    Physical Exam  Vitals and nursing note reviewed.    Constitutional:       General: He is not in acute distress.     Appearance: Normal appearance.   HENT:      Head: Normocephalic and atraumatic.      Right Ear: Tympanic membrane normal.      Left Ear: Tympanic membrane normal.      Nose: Nose normal.      Mouth/Throat:      Mouth: Mucous membranes are moist.      Pharynx: No oropharyngeal exudate or posterior oropharyngeal erythema.   Eyes:      Extraocular Movements: Extraocular movements intact.      Pupils: Pupils are equal, round, and reactive to light.   Cardiovascular:      Rate and Rhythm: Normal rate and regular rhythm.      Pulses: Normal pulses.      Heart sounds: Normal heart sounds.   Pulmonary:      Effort: Pulmonary effort is normal. No respiratory distress.      Breath sounds: Normal breath sounds.   Abdominal:      General: Abdomen is flat. Bowel sounds are normal.      Palpations: Abdomen is soft.   Musculoskeletal:         General: Swelling and tenderness present. Normal range of motion.      Cervical back: Normal range of motion and neck supple.      Right lower leg: No edema.      Left lower leg: No edema.   Skin:     General: Skin is warm and dry.      Capillary Refill: Capillary refill takes less than 2 seconds.   Neurological:      General: No focal deficit present.      Mental Status: He is alert and oriented to person, place, and time.   Psychiatric:         Mood and Affect: Affect is blunt and flat.         Speech: Speech is delayed.         Additional Data:     Lab Results: I have personally reviewed pertinent reports.      Results from last 7 days   Lab Units 02/29/24  0617 02/28/24  0603 02/27/24  2031   WBC Thousand/uL 6.96   < > 7.95   HEMOGLOBIN g/dL 12.3   < > 12.6   HEMATOCRIT % 36.3*   < > 36.6   PLATELETS Thousands/uL 173   < > 184   NEUTROS PCT %  --   --  46   LYMPHS PCT %  --   --  35   MONOS PCT %  --   --  12   EOS PCT %  --   --  5    < > = values in this interval not displayed.     Results from last 7 days   Lab Units  02/29/24  0617 02/28/24  0603 02/27/24  2031   SODIUM mmol/L 138   < > 139   POTASSIUM mmol/L 4.1   < > 4.2   CHLORIDE mmol/L 103   < > 104   CO2 mmol/L 29   < > 28   BUN mg/dL 24   < > 24   CREATININE mg/dL 0.81   < > 0.61   ANION GAP mmol/L 6   < > 7   CALCIUM mg/dL 9.1   < > 9.3   ALBUMIN g/dL  --   --  4.1   TOTAL BILIRUBIN mg/dL  --   --  0.22   ALK PHOS U/L  --   --  66   ALT U/L  --   --  17   AST U/L  --   --  17   GLUCOSE RANDOM mg/dL 131   < > 83    < > = values in this interval not displayed.         Results from last 7 days   Lab Units 02/29/24  2142 02/29/24  1602 02/29/24  1207 02/29/24  0758 02/28/24  2058 02/28/24  1640 02/28/24  1110   POC GLUCOSE mg/dl 112 144* 99 177* 156* 83 186*               Imaging: I have personally reviewed pertinent reports.    No orders to display         EKG, Pathology, and Other Studies Reviewed on Admission:   EKG: N/A    Epic / Care Everywhere Records Reviewed: Yes    ** Please Note: This note has been constructed using a voice recognition system. **

## 2024-03-02 LAB
GLUCOSE SERPL-MCNC: 116 MG/DL (ref 65–140)
GLUCOSE SERPL-MCNC: 130 MG/DL (ref 65–140)
GLUCOSE SERPL-MCNC: 137 MG/DL (ref 65–140)
GLUCOSE SERPL-MCNC: 179 MG/DL (ref 65–140)

## 2024-03-02 PROCEDURE — 99232 SBSQ HOSP IP/OBS MODERATE 35: CPT

## 2024-03-02 PROCEDURE — 82948 REAGENT STRIP/BLOOD GLUCOSE: CPT

## 2024-03-02 RX ORDER — IBUPROFEN 600 MG/1
600 TABLET ORAL EVERY 6 HOURS PRN
Status: DISCONTINUED | OUTPATIENT
Start: 2024-03-02 | End: 2024-04-01 | Stop reason: HOSPADM

## 2024-03-02 RX ADMIN — GABAPENTIN 400 MG: 400 CAPSULE ORAL at 16:42

## 2024-03-02 RX ADMIN — PRAVASTATIN SODIUM 80 MG: 40 TABLET ORAL at 16:42

## 2024-03-02 RX ADMIN — ZONISAMIDE 300 MG: 100 CAPSULE ORAL at 21:13

## 2024-03-02 RX ADMIN — QUETIAPINE FUMARATE 200 MG: 150 TABLET, EXTENDED RELEASE ORAL at 14:42

## 2024-03-02 RX ADMIN — LISINOPRIL 10 MG: 10 TABLET ORAL at 08:12

## 2024-03-02 RX ADMIN — LORATADINE 10 MG: 10 TABLET ORAL at 08:12

## 2024-03-02 RX ADMIN — LACOSAMIDE 150 MG: 150 TABLET ORAL at 08:12

## 2024-03-02 RX ADMIN — LACOSAMIDE 200 MG: 150 TABLET ORAL at 21:13

## 2024-03-02 RX ADMIN — INSULIN LISPRO 1 UNITS: 100 INJECTION, SOLUTION INTRAVENOUS; SUBCUTANEOUS at 21:17

## 2024-03-02 RX ADMIN — B-COMPLEX W/ C & FOLIC ACID TAB 1 TABLET: TAB at 08:12

## 2024-03-02 RX ADMIN — GABAPENTIN 400 MG: 400 CAPSULE ORAL at 21:13

## 2024-03-02 RX ADMIN — DIVALPROEX SODIUM 1000 MG: 500 TABLET, DELAYED RELEASE ORAL at 21:13

## 2024-03-02 RX ADMIN — QUETIAPINE FUMARATE 200 MG: 150 TABLET, EXTENDED RELEASE ORAL at 08:11

## 2024-03-02 RX ADMIN — LEVOTHYROXINE SODIUM 150 MCG: 75 TABLET ORAL at 08:12

## 2024-03-02 RX ADMIN — ENOXAPARIN SODIUM 40 MG: 40 INJECTION SUBCUTANEOUS at 08:12

## 2024-03-02 RX ADMIN — QUETIAPINE 400 MG: 400 TABLET, FILM COATED, EXTENDED RELEASE ORAL at 21:13

## 2024-03-02 RX ADMIN — DIVALPROEX SODIUM 750 MG: 250 TABLET, DELAYED RELEASE ORAL at 08:11

## 2024-03-02 RX ADMIN — GABAPENTIN 400 MG: 400 CAPSULE ORAL at 08:12

## 2024-03-02 RX ADMIN — LEVETIRACETAM 1500 MG: 500 TABLET, FILM COATED ORAL at 08:12

## 2024-03-02 RX ADMIN — LEVETIRACETAM 1500 MG: 500 TABLET, FILM COATED ORAL at 21:13

## 2024-03-02 NOTE — ASSESSMENT & PLAN NOTE
Lab Results   Component Value Date    HGBA1C 5.5 11/27/2023       Recent Labs     03/01/24  1619 03/01/24 2039 03/02/24  0650 03/02/24  1045   POCGLU 114 155* 116 137         Blood Sugar Average: Last 72 hrs:  (P) 108.5Continue diabetic diet   Monitor Accu-Cheks before meals and at bedtime

## 2024-03-02 NOTE — PLAN OF CARE
Problem: PAIN - ADULT  Goal: Verbalizes/displays adequate comfort level or baseline comfort level  Description: Interventions:  - Encourage patient to monitor pain and request assistance  - Assess pain using appropriate pain scale  - Administer analgesics based on type and severity of pain and evaluate response  - Implement non-pharmacological measures as appropriate and evaluate response  - Consider cultural and social influences on pain and pain management  - Notify physician/advanced practitioner if interventions unsuccessful or patient reports new pain  Outcome: Progressing     Problem: INFECTION - ADULT  Goal: Absence or prevention of progression during hospitalization  Description: INTERVENTIONS:  - Assess and monitor for signs and symptoms of infection  - Monitor lab/diagnostic results  - Monitor all insertion sites, i.e. indwelling lines, tubes, and drains  - Monitor endotracheal if appropriate and nasal secretions for changes in amount and color  - Harold appropriate cooling/warming therapies per order  - Administer medications as ordered  - Instruct and encourage patient and family to use good hand hygiene technique  - Identify and instruct in appropriate isolation precautions for identified infection/condition  Outcome: Progressing  Goal: Absence of fever/infection during neutropenic period  Description: INTERVENTIONS:  - Monitor WBC    Outcome: Progressing     Problem: SAFETY ADULT  Goal: Patient will remain free of falls  Description: INTERVENTIONS:  - Educate patient/family on patient safety including physical limitations  - Instruct patient to call for assistance with activity   - Consult OT/PT to assist with strengthening/mobility   - Keep Call bell within reach  - Keep bed low and locked with side rails adjusted as appropriate  - Keep care items and personal belongings within reach  - Initiate and maintain comfort rounds  - Make Fall Risk Sign visible to staff  - Offer Toileting every 2 Hours, in  advance of need  - Initiate/Maintain bed alarm  - Obtain necessary fall risk management equipment  - Apply yellow socks and bracelet for high fall risk patients  - Consider moving patient to room near nurses station  Outcome: Progressing  Goal: Maintain or return to baseline ADL function  Description: INTERVENTIONS:  -  Assess patient's ability to carry out ADLs; assess patient's baseline for ADL function and identify physical deficits which impact ability to perform ADLs (bathing, care of mouth/teeth, toileting, grooming, dressing, etc.)  - Assess/evaluate cause of self-care deficits   - Assess range of motion  - Assess patient's mobility; develop plan if impaired  - Assess patient's need for assistive devices and provide as appropriate  - Encourage maximum independence but intervene and supervise when necessary  - Involve family in performance of ADLs  - Assess for home care needs following discharge   - Consider OT consult to assist with ADL evaluation and planning for discharge  - Provide patient education as appropriate  Outcome: Progressing  Goal: Maintains/Returns to pre admission functional level  Description: INTERVENTIONS:  - Perform AM-PAC 6 Click Basic Mobility/ Daily Activity assessment daily.  - Set and communicate daily mobility goal to care team and patient/family/caregiver.   - Collaborate with rehabilitation services on mobility goals if consulted  - Perform Range of Motion 3 times a day.  - Reposition patient every 2 hours.  - Dangle patient 3 times a day  - Stand patient 3 times a day  - Ambulate patient 3 times a day  - Out of bed to chair 3 times a day   - Out of bed for meals 3 times a day  - Out of bed for toileting  - Record patient progress and toleration of activity level   Outcome: Progressing     Problem: DISCHARGE PLANNING  Goal: Discharge to home or other facility with appropriate resources  Description: INTERVENTIONS:  - Identify barriers to discharge w/patient and caregiver  -  Arrange for needed discharge resources and transportation as appropriate  - Identify discharge learning needs (meds, wound care, etc.)  - Arrange for interpretive services to assist at discharge as needed  - Refer to Case Management Department for coordinating discharge planning if the patient needs post-hospital services based on physician/advanced practitioner order or complex needs related to functional status, cognitive ability, or social support system  Outcome: Progressing     Problem: Knowledge Deficit  Goal: Patient/family/caregiver demonstrates understanding of disease process, treatment plan, medications, and discharge instructions  Description: Complete learning assessment and assess knowledge base.  Interventions:  - Provide teaching at level of understanding  - Provide teaching via preferred learning methods  Outcome: Progressing     Problem: Prexisting or High Potential for Compromised Skin Integrity  Goal: Skin integrity is maintained or improved  Description: INTERVENTIONS:  - Identify patients at risk for skin breakdown  - Assess and monitor skin integrity  - Assess and monitor nutrition and hydration status  - Monitor labs   - Assess for incontinence   - Turn and reposition patient  - Assist with mobility/ambulation  - Relieve pressure over bony prominences  - Avoid friction and shearing  - Provide appropriate hygiene as needed including keeping skin clean and dry  - Evaluate need for skin moisturizer/barrier cream  - Collaborate with interdisciplinary team   - Patient/family teaching  - Consider wound care consult   Outcome: Progressing

## 2024-03-02 NOTE — NURSING NOTE
"Patient agitated yelling ' fuck you ass hole\" and threw is water cup across the room. Patient was reaching for the masamo trying to pull it from the wall. Visitors could hear him.   "

## 2024-03-02 NOTE — PROGRESS NOTES
[facility]  Progress Note  Name: Jairon Oconnell I  MRN: [id number]  Unit/Bed#: -01 I Date of Admission: 3/1/2024   Date of Service: 3/2/2024 I Hospital Day: 1    Assessment/Plan   * Ambulatory dysfunction  Assessment & Plan    Patient was previously discharged from this facility to skilled nursing facility where he had an episode of agitation in which he broke a chair and became agitated   Patient has nonoperative right hip greater trochanter fracture was seen and evaluated by orthopedic surgery previous admission  Patient was seen and evaluated by PT OT medically cleared for discharge to skilled nursing facility  While at facility patient had an episode agitation and broken chair was aggressive with staff and they are refusing to take him back  Will consult case management for assistance in placement  Follow-up PT OT.  Will discuss with case management.    Pressure ulcer of sacral region, stage 3 (HCC)  Assessment & Plan  Consult wound care in a.m.    Acquired hypothyroidism  Assessment & Plan  Continue prehospital levothyroxine 150 mcg p.o. every morning    Essential hypertension  Assessment & Plan  Continue previously prescribed lisinopril 10 mg p.o. daily    Hyperlipidemia  Assessment & Plan  Continue previously prescribed Pravachol 80 mg p.o. nightly    Generalized convulsive epilepsy (HCC)  Assessment & Plan  Continue previously prescribed Depakote DR 1000 mg p.o. nightly and 750 mg p.o. daily, Neurontin 400 mg p.o. 3 times daily, Vimpat 150 mg p.o. daily and 200 mg p.o. nightly and Keppra 1500 mg p.o. every 12 hours  Placed on seizure precautions    Type 2 diabetes mellitus with diabetic neuropathy, without long-term current use of insulin (HCC)  Assessment & Plan  Lab Results   Component Value Date    HGBA1C 5.5 11/27/2023       Recent Labs     03/01/24  1619 03/01/24  2039 03/02/24  0650 03/02/24  1045   POCGLU 114 155* 116 137         Blood Sugar Average: Last 72 hrs:  (P)  108.5Continue diabetic diet   Monitor Accu-Cheks before meals and at bedtime           VTE Pharmacologic Prophylaxis:   High Risk (Score >/= 5) - Pharmacological DVT Prophylaxis Ordered: enoxaparin (Lovenox). Sequential Compression Devices Ordered.    Mobility:   Basic Mobility Inpatient Raw Score: 6  JH-HLM Goal: 2: Bed activities/Dependent transfer  JH-HLM Achieved: 2: Bed activities/Dependent transfer  HLM Goal achieved. Continue to encourage appropriate mobility.    Patient Centered Rounds: I performed bedside rounds with nursing staff today.   Discussions with Specialists or Other Care Team Provider: CM    Education and Discussions with Family / Patient: Patient declined call to .     Total Time Spent on Date of Encounter in care of patient: 35 mins. This time was spent on one or more of the following: performing physical exam; counseling and coordination of care; obtaining or reviewing history; documenting in the medical record; reviewing/ordering tests, medications or procedures; communicating with other healthcare professionals and discussing with patient's family/caregivers.    Current Length of Stay: 1 day(s)  Current Patient Status: Inpatient   Certification Statement: The patient will continue to require additional inpatient hospital stay due to awaiting safe dispo/facility  Discharge Plan:  To be determined, per CM difficult placement    Code Status: Level 1 - Full Code    Subjective:   Patient is a poor historian although offers no complaints at this time.  He denies chest pain/pressure, palpitations, lightheadedness, nausea, shortness of breath, or chills.    Objective:     Vitals:   Temp (24hrs), Av.6 °F (36.4 °C), Min:97.2 °F (36.2 °C), Max:98 °F (36.7 °C)    Temp:  [97.2 °F (36.2 °C)-98 °F (36.7 °C)] 97.7 °F (36.5 °C)  HR:  [67-73] 69  Resp:  [18] 18  BP: (121-130)/(72-79) 130/79  SpO2:  [96 %-98 %] 96 %  Body mass index is 19.88 kg/m².     Input and Output Summary (last 24  hours):     Intake/Output Summary (Last 24 hours) at 3/2/2024 1121  Last data filed at 3/2/2024 0732  Gross per 24 hour   Intake 660 ml   Output 2100 ml   Net -1440 ml       Physical Exam:   Physical Exam  Vitals and nursing note reviewed.   Constitutional:       Appearance: He is normal weight.   HENT:      Head: Normocephalic.      Nose: Nose normal.      Mouth/Throat:      Mouth: Mucous membranes are moist.      Pharynx: Oropharynx is clear.   Eyes:      General: No scleral icterus.     Conjunctiva/sclera: Conjunctivae normal.      Pupils: Pupils are equal, round, and reactive to light.   Cardiovascular:      Rate and Rhythm: Normal rate and regular rhythm.      Heart sounds: No murmur heard.     No friction rub. No gallop.   Pulmonary:      Effort: Pulmonary effort is normal. No respiratory distress.      Breath sounds: Normal breath sounds. No stridor. No wheezing, rhonchi or rales.   Abdominal:      General: Abdomen is flat.      Palpations: Abdomen is soft.   Musculoskeletal:         General: Normal range of motion.      Cervical back: Normal range of motion and neck supple.      Right lower leg: No edema.      Left lower leg: No edema.   Lymphadenopathy:      Cervical: No cervical adenopathy.   Skin:     General: Skin is warm.      Coloration: Skin is not jaundiced or pale.      Findings: No bruising, erythema or lesion.   Neurological:      Mental Status: He is alert. Mental status is at baseline.      Cranial Nerves: No cranial nerve deficit.      Motor: No weakness.   Psychiatric:         Mood and Affect: Mood normal.         Behavior: Behavior normal.         Thought Content: Thought content normal.          Additional Data:     Labs:  Results from last 7 days   Lab Units 02/29/24  0617 02/28/24  0603 02/27/24  2031   WBC Thousand/uL 6.96   < > 7.95   HEMOGLOBIN g/dL 12.3   < > 12.6   HEMATOCRIT % 36.3*   < > 36.6   PLATELETS Thousands/uL 173   < > 184   NEUTROS PCT %  --   --  46   LYMPHS PCT %  --   --   35   MONOS PCT %  --   --  12   EOS PCT %  --   --  5    < > = values in this interval not displayed.     Results from last 7 days   Lab Units 02/29/24  0617 02/28/24  0603 02/27/24  2031   SODIUM mmol/L 138   < > 139   POTASSIUM mmol/L 4.1   < > 4.2   CHLORIDE mmol/L 103   < > 104   CO2 mmol/L 29   < > 28   BUN mg/dL 24   < > 24   CREATININE mg/dL 0.81   < > 0.61   ANION GAP mmol/L 6   < > 7   CALCIUM mg/dL 9.1   < > 9.3   ALBUMIN g/dL  --   --  4.1   TOTAL BILIRUBIN mg/dL  --   --  0.22   ALK PHOS U/L  --   --  66   ALT U/L  --   --  17   AST U/L  --   --  17   GLUCOSE RANDOM mg/dL 131   < > 83    < > = values in this interval not displayed.         Results from last 7 days   Lab Units 03/02/24  1045 03/02/24  0650 03/01/24  2039 03/01/24  1619 03/01/24  1121 03/01/24  0653 02/29/24  2142 02/29/24  1602 02/29/24  1207 02/29/24  0758 02/28/24  2058 02/28/24  1640   POC GLUCOSE mg/dl 137 116 155* 114 101 96 112 144* 99 177* 156* 83               Lines/Drains:  Invasive Devices       Peripheral Intravenous Line  Duration             Peripheral IV 02/27/24 Dorsal (posterior);Left Forearm 3 days              Drain  Duration             Urethral Catheter Double-lumen 16 Fr. 2 days                  Urinary Catheter:  Goal for removal: N/A - Chronic [place]               Imaging: No pertinent imaging reviewed.    Recent Cultures (last 7 days):         Last 24 Hours Medication List:   Current Facility-Administered Medications   Medication Dose Route Frequency Provider Last Rate    acetaminophen  650 mg Oral Q6H PRN Hasmukh Becker PA-C      divalproex sodium  1,000 mg Oral HS Hasmukh Becker PA-C      divalproex sodium  750 mg Oral Daily Hasmukh Becker PA-C      docusate sodium  100 mg Oral BID PRN Hasmukh Becker PA-C      enoxaparin  40 mg Subcutaneous Daily Hasmukh Becker PA-C      gabapentin  400 mg Oral TID Hasmukh Becker, PA-C      ibuprofen  600 mg Oral Q6H PRN Hasmukh Becker PA-C      insulin lispro  1-5 Units Subcutaneous TID  AC Hasmukh Becker, JAIR      insulin lispro  1-6 Units Subcutaneous HS Hasmukh Becker, JAIR      lacosamide  150 mg Oral Daily Hasmukh Becker PA-C      lacosamide  200 mg Oral HS Hasmukh Becker PA-C      levETIRAcetam  1,500 mg Oral Q12H RONALD Hasmukh Becker PA-C      levothyroxine  150 mcg Oral Daily Hasmukh Becker PA-C      lisinopril  10 mg Oral Daily Hasmukh Becker PA-C      loratadine  10 mg Oral Daily Hasmukh Becker PA-C      multivitamin stress formula  1 tablet Oral Daily Hasmukh Becker PA-C      OLANZapine  5 mg Intramuscular Q3H PRN Hasmukh Becker PA-C      pravastatin  80 mg Oral Daily With Dinner Hasmkuh Becker PA-C      QUEtiapine  200 mg Oral BID Hasmukh Becker PA-C      QUEtiapine  400 mg Oral HS aHsmukh Becker PA-C      temazepam  15 mg Oral HS PRN Hasmukh Becker PA-C      zonisamide  300 mg Oral HS Hasmukh Becker PA-C          Today, Patient Was Seen By: Ancelmo Mota PA-C    **Please Note: This note may have been constructed using a voice recognition system.**

## 2024-03-03 LAB
GLUCOSE SERPL-MCNC: 135 MG/DL (ref 65–140)
GLUCOSE SERPL-MCNC: 138 MG/DL (ref 65–140)
GLUCOSE SERPL-MCNC: 141 MG/DL (ref 65–140)
GLUCOSE SERPL-MCNC: 90 MG/DL (ref 65–140)
MRSA NOSE QL CULT: NORMAL

## 2024-03-03 PROCEDURE — 82948 REAGENT STRIP/BLOOD GLUCOSE: CPT

## 2024-03-03 PROCEDURE — 99232 SBSQ HOSP IP/OBS MODERATE 35: CPT

## 2024-03-03 RX ADMIN — QUETIAPINE 400 MG: 400 TABLET, FILM COATED, EXTENDED RELEASE ORAL at 21:12

## 2024-03-03 RX ADMIN — LACOSAMIDE 150 MG: 150 TABLET ORAL at 08:20

## 2024-03-03 RX ADMIN — LACOSAMIDE 200 MG: 150 TABLET ORAL at 21:11

## 2024-03-03 RX ADMIN — LEVOTHYROXINE SODIUM 150 MCG: 75 TABLET ORAL at 08:15

## 2024-03-03 RX ADMIN — ZONISAMIDE 300 MG: 100 CAPSULE ORAL at 21:12

## 2024-03-03 RX ADMIN — B-COMPLEX W/ C & FOLIC ACID TAB 1 TABLET: TAB at 08:14

## 2024-03-03 RX ADMIN — ENOXAPARIN SODIUM 40 MG: 40 INJECTION SUBCUTANEOUS at 08:15

## 2024-03-03 RX ADMIN — DIVALPROEX SODIUM 1000 MG: 500 TABLET, DELAYED RELEASE ORAL at 21:11

## 2024-03-03 RX ADMIN — DIVALPROEX SODIUM 750 MG: 250 TABLET, DELAYED RELEASE ORAL at 08:14

## 2024-03-03 RX ADMIN — GABAPENTIN 400 MG: 400 CAPSULE ORAL at 17:00

## 2024-03-03 RX ADMIN — LEVETIRACETAM 1500 MG: 500 TABLET, FILM COATED ORAL at 08:14

## 2024-03-03 RX ADMIN — LISINOPRIL 10 MG: 10 TABLET ORAL at 08:14

## 2024-03-03 RX ADMIN — GABAPENTIN 400 MG: 400 CAPSULE ORAL at 21:12

## 2024-03-03 RX ADMIN — QUETIAPINE FUMARATE 200 MG: 150 TABLET, EXTENDED RELEASE ORAL at 14:17

## 2024-03-03 RX ADMIN — GABAPENTIN 400 MG: 400 CAPSULE ORAL at 08:14

## 2024-03-03 RX ADMIN — LEVETIRACETAM 1500 MG: 500 TABLET, FILM COATED ORAL at 21:11

## 2024-03-03 RX ADMIN — QUETIAPINE FUMARATE 200 MG: 150 TABLET, EXTENDED RELEASE ORAL at 08:14

## 2024-03-03 RX ADMIN — LORATADINE 10 MG: 10 TABLET ORAL at 08:14

## 2024-03-03 RX ADMIN — PRAVASTATIN SODIUM 80 MG: 40 TABLET ORAL at 16:59

## 2024-03-03 NOTE — CASE MANAGEMENT
Case Management Assessment & Discharge Planning Note    Patient name Jairon Oconnell  Location /-01 MRN [id number]  : 1965 Date 3/3/2024       Current Admission Date: 3/1/2024  Current Admission Diagnosis:Ambulatory dysfunction   Patient Active Problem List    Diagnosis Date Noted    Nondisplaced fracture of greater trochanter of right femur, subsequent encounter for closed fracture with routine healing 2024    Closed nondisplaced fracture of proximal phalanx of left index finger with routine healing, subsequent encounter 10/30/2023    Pressure ulcer of sacral region, stage 3 (HCC) 2023    Ambulatory dysfunction 2022    Social problem 2021    Hypomagnesemia 2021    Thrombocytopathia ([facility]) 2020    Hyponatremia 2019    Metabolic encephalopathy 2019    Seborrheic dermatitis of scalp 2019    Nearsightedness 2019    Behavior concern in adult 2019    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HCC) 2019    Vitamin B12 deficiency 2017    Hyperlipidemia 2016    Vitamin D deficiency 2016    Type 2 diabetes mellitus with diabetic neuropathy, without long-term current use of insulin ([facility]) 06/15/2016    Acquired hypothyroidism 06/15/2016    Cataract 2013    Cerebral palsy ([facility]) 2013    Generalized convulsive epilepsy ([facility]) 2013    Essential hypertension 2013    Osteoporosis 2013    Scoliosis 2013      LOS (days): 2  Geometric Mean LOS (GMLOS) (days): 3.9  Days to GMLOS:1.6     OBJECTIVE:  PATIENT READMITTED TO HOSPITAL  Risk of Unplanned Readmission Score: 19.77         Current admission status: Inpatient  Referral Reason: Other (d/c planning)    Preferred Pharmacy:   [facility] [place] - [address]  [address] [id number]  Phone: [phone] Fax: [phone]    Primary Care Provider: HUNTER Brown    Primary Insurance:  MEDICARE  Secondary Insurance[place] [employer]    ASSESSMENT:  Active Health Care Proxies       Kemi Decker Health Care Representative - Sister   Primary Phone: [phone] (Home)                 Advance Directives  Does patient have a Health Care POA?: No  Was patient offered paperwork?: Yes (declined paperwork)  Does patient have Advance Directives?: No  Was patient offered paperwork?: Yes (declined paperwork)         Readmission Root Cause  30 Day Readmission: Yes  Who directed you to return to the hospital?: Other (comment) (transfer from [facility])  Patient was readmitted due to: was admitted due to behavioral problemes- pt was sent to [facility] from [place] and then pt was transferred to [facility]  Action Plan: snf rehab and then return to his group home    Patient Information  Admitted from:: Facility ([facility])  Mental Status: Alert  During Assessment patient was accompanied by: Not accompanied during assessment  Assessment information provided by:: Sister  Primary Caregiver: Other (Comment)  Caregiver's Name:: Jaylyn Yen  Caregiver's Relationship to Patient:: Other (Specify) (caregiver at the group home)  Caregiver's Telephone Number:: [phone]  Support Systems: Family members, Other (Comment) (caregiver & casemanager)  County of Residence: [place]  What [facility] do you live in?: [place]  Home entry access options. Select all that apply.: Ramp  Type of Current Residence: [address] home  Upon entering residence, is there a bedroom on the main floor (no further steps)?: Yes  Upon entering residence, is there a bathroom on the main floor (no further steps)?: Yes  Living Arrangements: Other (Comment) (group home)  Is patient a ?: No    Activities of Daily Living Prior to Admission  Functional Status: Assistance (meds,cooking ,cleaning, driving, shopping, laundry)  Completes ADLs independently?: No  Level of ADL dependence: Assistance  Ambulates independently?: No  Level of  ambulatory dependence: Assistance  Does patient use assisted devices?: Yes  Assisted Devices (DME) used: Wheelchair (long distance and outside)  Does patient currently own DME?: Yes  What DME does the patient currently own?: Wheelchair  Does patient have a history of Outpatient Therapy (PT/OT)?: Yes  Does the patient have a history of Short-Term Rehab?: Yes ([facility])  Does patient have a history of HHC?: Yes (family unsure of the agency)  Does patient currently have HHC?: No         Patient Information Continued  Income Source: SSI/SSD (disabled)  Does patient have prescription coverage?: Yes ([facility])  Does patient receive dialysis treatments?: No  Does patient have a history of substance abuse?: No  Does patient have a history of Mental Health Diagnosis?: Yes (intermittent explosive disorder)  Is patient receiving treatment for mental health?: Yes (medication)  Has patient received inpatient treatment related to mental health in the last 2 years?: No         Means of Transportation  Means of Transport to Appts:: Other (Comment) (caregiver)      Social Determinants of Health (SDOH)      Flowsheet Row Most Recent Value   Housing Stability    In the last 12 months, was there a time when you were not able to pay the mortgage or rent on time? N   In the last 12 months, how many places have you lived? 1   In the last 12 months, was there a time when you did not have a steady place to sleep or slept in a shelter (including now)? N   Transportation Needs    In the past 12 months, has lack of transportation kept you from medical appointments or from getting medications? no   In the past 12 months, has lack of transportation kept you from meetings, work, or from getting things needed for daily living? No   Food Insecurity    Within the past 12 months, you worried that your food would run out before you got the money to buy more. Never true   Within the past 12 months, the food you bought just didn't last and you didn't  have money to get more. Never true   Utilities    In the past 12 months has the electric, gas, oil, or water company threatened to shut off services in your home? No            DISCHARGE DETAILS:    Discharge planning discussed with:: sister  Freedom of Choice: Yes  Comments - Freedom of Choice: permission to send a blanket referral via [facility]  CM contacted family/caregiver?: Yes             Contacts  Patient Contacts: Kemi Decker  Relationship to Patient:: Family (sister)  Contact Method: Phone  Phone Number: [phone]  Reason/Outcome: Discharge Planning    Requested Home Health Care         Is the patient interested in HHC at discharge?: No    DME Referral Provided  Referral made for DME?: No    Other Referral/Resources/Interventions Provided:  Interventions: Short Term Rehab  Referral Comments: referrals [facility] for snf rehab via [facility]    Would you like to participate in our [facility] service program?  : No - Declined    Treatment Team Recommendation: Short Term Rehab (snf rehab via [facility] - [facility])

## 2024-03-03 NOTE — PLAN OF CARE
Problem: PAIN - ADULT  Goal: Verbalizes/displays adequate comfort level or baseline comfort level  Description: Interventions:  - Encourage patient to monitor pain and request assistance  - Assess pain using appropriate pain scale  - Administer analgesics based on type and severity of pain and evaluate response  - Implement non-pharmacological measures as appropriate and evaluate response  - Consider cultural and social influences on pain and pain management  - Notify physician/advanced practitioner if interventions unsuccessful or patient reports new pain  Outcome: Progressing     Problem: INFECTION - ADULT  Goal: Absence or prevention of progression during hospitalization  Description: INTERVENTIONS:  - Assess and monitor for signs and symptoms of infection  - Monitor lab/diagnostic results  - Monitor all insertion sites, i.e. indwelling lines, tubes, and drains  - Monitor endotracheal if appropriate and nasal secretions for changes in amount and color  - [place] appropriate cooling/warming therapies per order  - Administer medications as ordered  - Instruct and encourage patient and family to use good hand hygiene technique  - Identify and instruct in appropriate isolation precautions for identified infection/condition  Outcome: Progressing  Goal: Absence of fever/infection during neutropenic period  Description: INTERVENTIONS:  - Monitor WBC    Outcome: Progressing     Problem: SAFETY ADULT  Goal: Patient will remain free of falls  Description: INTERVENTIONS:  - Educate patient/family on patient safety including physical limitations  - Instruct patient to call for assistance with activity   - Consult OT/PT to assist with strengthening/mobility   - Keep Call bell within reach  - Keep bed low and locked with side rails adjusted as appropriate  - Keep care items and personal belongings within reach  - Initiate and maintain comfort rounds  - Make Fall Risk Sign visible to staff  - Offer Toileting every 2 Hours, in  advance of need  - Initiate/Maintain bed alarm  - Obtain necessary fall risk management equipment  - Apply yellow socks and bracelet for high fall risk patients  - Consider moving patient to room near nurses station  Outcome: Progressing  Goal: Maintain or return to baseline ADL function  Description: INTERVENTIONS:  -  Assess patient's ability to carry out ADLs; assess patient's baseline for ADL function and identify physical deficits which impact ability to perform ADLs (bathing, care of mouth/teeth, toileting, grooming, dressing, etc.)  - Assess/evaluate cause of self-care deficits   - Assess range of motion  - Assess patient's mobility; develop plan if impaired  - Assess patient's need for assistive devices and provide as appropriate  - Encourage maximum independence but intervene and supervise when necessary  - Involve family in performance of ADLs  - Assess for home care needs following discharge   - Consider OT consult to assist with ADL evaluation and planning for discharge  - Provide patient education as appropriate  Outcome: Progressing  Goal: Maintains/Returns to pre admission functional level  Description: INTERVENTIONS:  - Perform AM-PAC 6 Click Basic Mobility/ Daily Activity assessment daily.  - Set and communicate daily mobility goal to care team and patient/family/caregiver.   - Collaborate with rehabilitation services on mobility goals if consulted  - Perform Range of Motion 3 times a day.  - Reposition patient every 2 hours.  - Dangle patient 3 times a day  - Stand patient 3 times a day  - Ambulate patient 3 times a day  - Out of bed to chair 3 times a day   - Out of bed for meals 3 times a day  - Out of bed for toileting  - Record patient progress and toleration of activity level   Outcome: Progressing     Problem: DISCHARGE PLANNING  Goal: Discharge to home or other facility with appropriate resources  Description: INTERVENTIONS:  - Identify barriers to discharge w/patient and caregiver  -  Arrange for needed discharge resources and transportation as appropriate  - Identify discharge learning needs (meds, wound care, etc.)  - Arrange for interpretive services to assist at discharge as needed  - Refer to Case Management Department for coordinating discharge planning if the patient needs post-hospital services based on physician/advanced practitioner order or complex needs related to functional status, cognitive ability, or social support system  Outcome: Progressing     Problem: Knowledge Deficit  Goal: Patient/family/caregiver demonstrates understanding of disease process, treatment plan, medications, and discharge instructions  Description: Complete learning assessment and assess knowledge base.  Interventions:  - Provide teaching at level of understanding  - Provide teaching via preferred learning methods  Outcome: Progressing     Problem: Prexisting or High Potential for Compromised Skin Integrity  Goal: Skin integrity is maintained or improved  Description: INTERVENTIONS:  - Identify patients at risk for skin breakdown  - Assess and monitor skin integrity  - Assess and monitor nutrition and hydration status  - Monitor labs   - Assess for incontinence   - Turn and reposition patient  - Assist with mobility/ambulation  - Relieve pressure over bony prominences  - Avoid friction and shearing  - Provide appropriate hygiene as needed including keeping skin clean and dry  - Evaluate need for skin moisturizer/barrier cream  - Collaborate with interdisciplinary team   - Patient/family teaching  - Consider wound care consult   Outcome: Progressing

## 2024-03-03 NOTE — PROGRESS NOTES
[facility]  Progress Note  Name: Jairon Oconnell I  MRN: [id number]  Unit/Bed#: -01 I Date of Admission: 3/1/2024   Date of Service: 3/3/2024 I Hospital Day: 2    Assessment/Plan   * Ambulatory dysfunction  Assessment & Plan    Patient was previously discharged from this facility to skilled nursing facility where he had an episode of agitation in which he broke a chair and became agitated   Patient has nonoperative right hip greater trochanter fracture was seen and evaluated by orthopedic surgery previous admission  Patient was seen and evaluated by PT OT medically cleared for discharge to skilled nursing facility  While at facility patient had an episode agitation and broken chair was aggressive with staff and they are refusing to take him back  Will consult case management for assistance in placement  PT/OT recommending level 2 moderate intervention rehabilitation  Will discuss with case management.    Acquired hypothyroidism  Assessment & Plan  Continue prehospital levothyroxine 150 mcg p.o. every morning    Essential hypertension  Assessment & Plan  Continue previously prescribed lisinopril 10 mg p.o. daily    Hyperlipidemia  Assessment & Plan  Continue previously prescribed Pravachol 80 mg p.o. nightly    Generalized convulsive epilepsy (HCC)  Assessment & Plan  Continue previously prescribed Depakote DR 1000 mg p.o. nightly and 750 mg p.o. daily, Neurontin 400 mg p.o. 3 times daily, Vimpat 150 mg p.o. daily and 200 mg p.o. nightly and Keppra 1500 mg p.o. every 12 hours  Placed on seizure precautions    Type 2 diabetes mellitus with diabetic neuropathy, without long-term current use of insulin (HCC)  Assessment & Plan  Lab Results   Component Value Date    HGBA1C 5.5 11/27/2023       Recent Labs     03/02/24  1618 03/02/24  2048 03/03/24  0647 03/03/24  1053   POCGLU 130 179* 90 138         Blood Sugar Average: Last 72 hrs:  (P) 132Continue diabetic diet   Monitor Accu-Cheks before  meals and at bedtime           VTE Pharmacologic Prophylaxis:   Moderate Risk (Score 3-4) - Pharmacological DVT Prophylaxis Ordered: enoxaparin (Lovenox).    Mobility:   Basic Mobility Inpatient Raw Score: 6  -HLM Goal: 2: Bed activities/Dependent transfer  -HLM Achieved: 4: Move to chair/commode  HLM Goal achieved. Continue to encourage appropriate mobility.    Patient Centered Rounds: I performed bedside rounds with nursing staff today.   Discussions with Specialists or Other Care Team Provider: CM    Education and Discussions with Family / Patient: Attempted to update  (sister) via phone. Left voicemail.     Total Time Spent on Date of Encounter in care of patient: 35 mins. This time was spent on one or more of the following: performing physical exam; counseling and coordination of care; obtaining or reviewing history; documenting in the medical record; reviewing/ordering tests, medications or procedures; communicating with other healthcare professionals and discussing with patient's family/caregivers.    Current Length of Stay: 2 day(s)  Current Patient Status: Inpatient   Certification Statement: The patient will continue to require additional inpatient hospital stay due to awaiting rehab placement  Discharge Plan:  Per CM will be difficult to find placement, TBD    Code Status: Level 1 - Full Code    Subjective:   Patient is a poor historian although offers no complaints this time.  Currently denies chest pain/pressure, palpitations, lightheadedness, nausea, shortness of breath, or chills.    Objective:     Vitals:   Temp (24hrs), Av.9 °F (36.6 °C), Min:97.7 °F (36.5 °C), Max:98.3 °F (36.8 °C)    Temp:  [97.7 °F (36.5 °C)-98.3 °F (36.8 °C)] 97.7 °F (36.5 °C)  HR:  [78-83] 78  Resp:  [18] 18  BP: (119-128)/(73-84) 128/84  SpO2:  [94 %-97 %] 97 %  Body mass index is 19.88 kg/m².     Input and Output Summary (last 24 hours):     Intake/Output Summary (Last 24 hours) at 3/3/2024 1107  Last  data filed at 3/3/2024 0730  Gross per 24 hour   Intake 420 ml   Output 1250 ml   Net -830 ml       Physical Exam:   Physical Exam  Vitals and nursing note reviewed.   Constitutional:       Appearance: He is normal weight.   HENT:      Head: Normocephalic.      Nose: Nose normal.      Mouth/Throat:      Mouth: Mucous membranes are moist.      Pharynx: Oropharynx is clear.   Eyes:      General: No scleral icterus.     Conjunctiva/sclera: Conjunctivae normal.      Pupils: Pupils are equal, round, and reactive to light.   Cardiovascular:      Rate and Rhythm: Normal rate and regular rhythm.      Heart sounds: No murmur heard.     No friction rub. No gallop.   Pulmonary:      Effort: Pulmonary effort is normal. No respiratory distress.      Breath sounds: Normal breath sounds. No stridor. No wheezing, rhonchi or rales.   Abdominal:      General: Abdomen is flat.      Palpations: Abdomen is soft.   Musculoskeletal:         General: Normal range of motion.      Cervical back: Normal range of motion and neck supple.      Right lower leg: No edema.      Left lower leg: No edema.   Lymphadenopathy:      Cervical: No cervical adenopathy.   Skin:     General: Skin is warm.      Coloration: Skin is not jaundiced or pale.      Findings: No bruising, erythema or lesion.   Neurological:      Mental Status: He is alert. Mental status is at baseline.      Cranial Nerves: No cranial nerve deficit.      Motor: No weakness.   Psychiatric:         Mood and Affect: Mood normal.         Behavior: Behavior normal.         Thought Content: Thought content normal.          Additional Data:     Labs:  Results from last 7 days   Lab Units 02/29/24  0617 02/28/24  0603 02/27/24  2031   WBC Thousand/uL 6.96   < > 7.95   HEMOGLOBIN g/dL 12.3   < > 12.6   HEMATOCRIT % 36.3*   < > 36.6   PLATELETS Thousands/uL 173   < > 184   NEUTROS PCT %  --   --  46   LYMPHS PCT %  --   --  35   MONOS PCT %  --   --  12   EOS PCT %  --   --  5    < > = values in  this interval not displayed.     Results from last 7 days   Lab Units 02/29/24  0617 02/28/24  0603 02/27/24  2031   SODIUM mmol/L 138   < > 139   POTASSIUM mmol/L 4.1   < > 4.2   CHLORIDE mmol/L 103   < > 104   CO2 mmol/L 29   < > 28   BUN mg/dL 24   < > 24   CREATININE mg/dL 0.81   < > 0.61   ANION GAP mmol/L 6   < > 7   CALCIUM mg/dL 9.1   < > 9.3   ALBUMIN g/dL  --   --  4.1   TOTAL BILIRUBIN mg/dL  --   --  0.22   ALK PHOS U/L  --   --  66   ALT U/L  --   --  17   AST U/L  --   --  17   GLUCOSE RANDOM mg/dL 131   < > 83    < > = values in this interval not displayed.         Results from last 7 days   Lab Units 03/03/24  1053 03/03/24  0647 03/02/24  2048 03/02/24  1618 03/02/24  1045 03/02/24  0650 03/01/24  2039 03/01/24  1619 03/01/24  1121 03/01/24  0653 02/29/24  2142 02/29/24  1602   POC GLUCOSE mg/dl 138 90 179* 130 137 116 155* 114 101 96 112 144*               Lines/Drains:  Invasive Devices       Peripheral Intravenous Line  Duration             Peripheral IV 02/27/24 Dorsal (posterior);Left Forearm 4 days              Drain  Duration             Urethral Catheter Double-lumen 16 Fr. 3 days                  Urinary Catheter:  Goal for removal: N/A- Discharging with [place]               Imaging: No pertinent imaging reviewed.    Recent Cultures (last 7 days):         Last 24 Hours Medication List:   Current Facility-Administered Medications   Medication Dose Route Frequency Provider Last Rate    acetaminophen  650 mg Oral Q6H PRN Hasmukh Becker PA-C      divalproex sodium  1,000 mg Oral HS Hasmukh Becker PA-C      divalproex sodium  750 mg Oral Daily Hasmukh Becker PA-C      docusate sodium  100 mg Oral BID PRN Hasmukh Becker PA-C      enoxaparin  40 mg Subcutaneous Daily Hasmukh Becker PA-C      gabapentin  400 mg Oral TID Hasmukh Becker PA-C      ibuprofen  600 mg Oral Q6H PRN Ancelmo Mota PA-C      insulin lispro  1-5 Units Subcutaneous TID AC Hasmukh Becker PA-C      insulin lispro  1-6 Units  Subcutaneous HS Hasmukh Becker, JAIR      lacosamide  150 mg Oral Daily Hasmukh Becker, JAIR      lacosamide  200 mg Oral HS Hasmukh Becker, JAIR      levETIRAcetam  1,500 mg Oral Q12H RONALD Hasmukh Becker, PA-DENIA      levothyroxine  150 mcg Oral Daily Hasmukh Becker, JAIR      lisinopril  10 mg Oral Daily Hasmukh Becker, JAIR      loratadine  10 mg Oral Daily Hasmukh Becker, JAIR      multivitamin stress formula  1 tablet Oral Daily Hasmukh Becker, JAIR      OLANZapine  5 mg Intramuscular Q3H PRN Hasmukh Becker PA-C      pravastatin  80 mg Oral Daily With Dinner Hasmukh Becker, JAIR      QUEtiapine  200 mg Oral BID Hasmukh Becker, JAIR      QUEtiapine  400 mg Oral HS Hasmukh Becker, JAIR      temazepam  15 mg Oral HS PRN Hasmukh Becker, JAIR      zonisamide  300 mg Oral HS Hasmukh Becker, JAIR          Today, Patient Was Seen By: Ancelmo Mota PA-C    **Please Note: This note may have been constructed using a voice recognition system.**

## 2024-03-03 NOTE — ASSESSMENT & PLAN NOTE
Lab Results   Component Value Date    HGBA1C 5.5 11/27/2023       Recent Labs     03/02/24  1618 03/02/24  2048 03/03/24  0647 03/03/24  1053   POCGLU 130 179* 90 138         Blood Sugar Average: Last 72 hrs:  (P) 132Continue diabetic diet   Monitor Accu-Cheks before meals and at bedtime

## 2024-03-03 NOTE — ASSESSMENT & PLAN NOTE
Patient was previously discharged from this facility to skilled nursing facility where he had an episode of agitation in which he broke a chair and became agitated   Patient has nonoperative right hip greater trochanter fracture was seen and evaluated by orthopedic surgery previous admission  Patient was seen and evaluated by PT OT medically cleared for discharge to skilled nursing facility  While at facility patient had an episode agitation and broken chair was aggressive with staff and they are refusing to take him back  Will consult case management for assistance in placement  PT/OT recommending level 2 moderate intervention rehabilitation  Will discuss with case management.

## 2024-03-04 LAB
GLUCOSE SERPL-MCNC: 119 MG/DL (ref 65–140)
GLUCOSE SERPL-MCNC: 136 MG/DL (ref 65–140)
GLUCOSE SERPL-MCNC: 137 MG/DL (ref 65–140)
GLUCOSE SERPL-MCNC: 144 MG/DL (ref 65–140)

## 2024-03-04 PROCEDURE — 97535 SELF CARE MNGMENT TRAINING: CPT

## 2024-03-04 PROCEDURE — 97116 GAIT TRAINING THERAPY: CPT

## 2024-03-04 PROCEDURE — 97530 THERAPEUTIC ACTIVITIES: CPT

## 2024-03-04 PROCEDURE — 82948 REAGENT STRIP/BLOOD GLUCOSE: CPT

## 2024-03-04 PROCEDURE — 99233 SBSQ HOSP IP/OBS HIGH 50: CPT | Performed by: NURSE PRACTITIONER

## 2024-03-04 PROCEDURE — 97112 NEUROMUSCULAR REEDUCATION: CPT

## 2024-03-04 RX ADMIN — DIVALPROEX SODIUM 1000 MG: 500 TABLET, DELAYED RELEASE ORAL at 22:18

## 2024-03-04 RX ADMIN — GABAPENTIN 400 MG: 400 CAPSULE ORAL at 16:29

## 2024-03-04 RX ADMIN — DIVALPROEX SODIUM 750 MG: 250 TABLET, DELAYED RELEASE ORAL at 08:10

## 2024-03-04 RX ADMIN — B-COMPLEX W/ C & FOLIC ACID TAB 1 TABLET: TAB at 08:10

## 2024-03-04 RX ADMIN — ZONISAMIDE 300 MG: 100 CAPSULE ORAL at 22:18

## 2024-03-04 RX ADMIN — GABAPENTIN 400 MG: 400 CAPSULE ORAL at 20:18

## 2024-03-04 RX ADMIN — QUETIAPINE FUMARATE 200 MG: 150 TABLET, EXTENDED RELEASE ORAL at 08:10

## 2024-03-04 RX ADMIN — LEVOTHYROXINE SODIUM 150 MCG: 75 TABLET ORAL at 08:10

## 2024-03-04 RX ADMIN — LACOSAMIDE 150 MG: 150 TABLET ORAL at 08:10

## 2024-03-04 RX ADMIN — QUETIAPINE 400 MG: 400 TABLET, FILM COATED, EXTENDED RELEASE ORAL at 22:18

## 2024-03-04 RX ADMIN — QUETIAPINE FUMARATE 200 MG: 150 TABLET, EXTENDED RELEASE ORAL at 14:14

## 2024-03-04 RX ADMIN — ENOXAPARIN SODIUM 40 MG: 40 INJECTION SUBCUTANEOUS at 08:10

## 2024-03-04 RX ADMIN — LACOSAMIDE 200 MG: 150 TABLET ORAL at 22:18

## 2024-03-04 RX ADMIN — LISINOPRIL 10 MG: 10 TABLET ORAL at 08:10

## 2024-03-04 RX ADMIN — LORATADINE 10 MG: 10 TABLET ORAL at 08:10

## 2024-03-04 RX ADMIN — LEVETIRACETAM 1500 MG: 500 TABLET, FILM COATED ORAL at 20:18

## 2024-03-04 RX ADMIN — GABAPENTIN 400 MG: 400 CAPSULE ORAL at 08:10

## 2024-03-04 RX ADMIN — PRAVASTATIN SODIUM 80 MG: 40 TABLET ORAL at 16:29

## 2024-03-04 RX ADMIN — LEVETIRACETAM 1500 MG: 500 TABLET, FILM COATED ORAL at 08:10

## 2024-03-04 NOTE — PROGRESS NOTES
[facility]  Progress Note  Name: Jairon Oconnell I  MRN: [id number]  Unit/Bed#: -01 I Date of Admission: 3/1/2024   Date of Service: 3/4/2024 I Hospital Day: 3    Assessment/Plan     * Ambulatory dysfunction  Assessment & Plan  Patient has nonoperative right hip greater trochanter fracture was seen and evaluated by orthopedic surgery on the previous admission  He was discharged to a skilled nursing facility agitated, broke a chair, and was reported to become aggressive with staff.  The facility will now not take him back  Seen and evaluated by PT OT medically cleared for discharge to skilled nursing facility  Case management following, input appreciated    Generalized convulsive epilepsy (HCC)  Assessment & Plan  Continue Depakote DR 1000 mg p.o. nightly and 750 mg p.o. daily, Neurontin 400 mg p.o. 3 times daily, Vimpat 150 mg p.o. daily and 200 mg p.o. nightly and Keppra 1500 mg p.o. every 12 hours  Continue seizure precautions    Type 2 diabetes mellitus with diabetic neuropathy, without long-term current use of insulin ([facility])  Assessment & Plan  Lab Results   Component Value Date    HGBA1C 5.5 11/27/2023       Recent Labs     03/03/24  2101 03/04/24  0707 03/04/24  1113 03/04/24  1600   POCGLU 135 119 144* 137         Blood Sugar Average: Last 72 hrs:  (P) 134.25    Continue diabetic diet   Continue fingerstick blood sugar with sliding scale coverage  Hypoglycemia protocol    Pressure ulcer of sacral region, stage 3 ([facility])  Assessment & Plan  Wound care recommendations appreciated: Scarring to the left lateral buttock from stage 3 pressure injury in the past. Scarring is intact and blanchable and hyperpigmented   Turn and reposition every 2 hours, frequent offloading    Acquired hypothyroidism  Assessment & Plan  Continue levothyroxine 150 mcg p.o. every morning    Essential hypertension  Assessment & Plan  Continue lisinopril 10 mg p.o. daily  Monitor blood  pressure    Hyperlipidemia  Assessment & Plan  Continue Pravachol 80 mg oral daily at bedtime           VTE Pharmacologic Prophylaxis:   High Risk (Score >/= 5) - Pharmacological DVT Prophylaxis Ordered: enoxaparin (Lovenox). Sequential Compression Devices Ordered.    Mobility:   Basic Mobility Inpatient Raw Score: 15  JH-HLM Goal: 4: Move to chair/commode  JH-HLM Achieved: 7: Walk 25 feet or more  HLM Goal achieved. Continue to encourage appropriate mobility.    Patient Centered Rounds: I performed bedside rounds with nursing staff today.   Discussions with Specialists or Other Care Team Provider: GORDON    Education and Discussions with Family / Patient:  No medical updates to provide..     Total Time Spent on Date of Encounter in care of patient: 45 mins. This time was spent on one or more of the following: performing physical exam; counseling and coordination of care; obtaining or reviewing history; documenting in the medical record; reviewing/ordering tests, medications or procedures; communicating with other healthcare professionals and discussing with patient's family/caregivers.    Current Length of Stay: 3 day(s)  Current Patient Status: Inpatient   Certification Statement: The patient will continue to require additional inpatient hospital stay due to care coordination.   Discharge Plan: Anticipate discharge in >72 hrs to D.     Code Status: Level 1 - Full Code    Subjective:   Specific complaints offered.  Unable to obtain full review of systems.     Objective:     Vitals:   Temp (24hrs), Av.1 °F (36.2 °C), Min:96.8 °F (36 °C), Max:97.3 °F (36.3 °C)    Temp:  [96.8 °F (36 °C)-97.3 °F (36.3 °C)] 96.8 °F (36 °C)  HR:  [84-86] 84  Resp:  [18-19] 19  BP: (119-137)/(69-79) 137/69  SpO2:  [95 %-96 %] 96 %  Body mass index is 19.88 kg/m².     Input and Output Summary (last 24 hours):     Intake/Output Summary (Last 24 hours) at 3/4/2024 4280  Last data filed at 3/4/2024 172  Gross per 24 hour   Intake 600 ml    Output 1450 ml   Net -850 ml       Physical Exam:   Physical Exam  Vitals and nursing note reviewed.   Constitutional:       General: He is not in acute distress.  HENT:      Head: Normocephalic and atraumatic.      Nose: Nose normal.      Mouth/Throat:      Mouth: Mucous membranes are moist.      Pharynx: Oropharynx is clear.   Eyes:      Pupils: Pupils are equal, round, and reactive to light.   Cardiovascular:      Rate and Rhythm: Normal rate and regular rhythm.      Pulses: Normal pulses.      Heart sounds: Normal heart sounds.   Pulmonary:      Effort: Pulmonary effort is normal. No respiratory distress.      Breath sounds: Normal breath sounds.   Abdominal:      General: Bowel sounds are normal.      Palpations: Abdomen is soft.      Tenderness: There is no abdominal tenderness.   Musculoskeletal:      Cervical back: Neck supple.      Right lower leg: No edema.      Left lower leg: No edema.   Skin:     General: Skin is warm and dry.      Capillary Refill: Capillary refill takes less than 2 seconds.   Neurological:      General: No focal deficit present.      Mental Status: He is alert. Mental status is at baseline.          Additional Data:     Labs:  Results from last 7 days   Lab Units 02/29/24  0617 02/28/24  0603 02/27/24 2031   WBC Thousand/uL 6.96   < > 7.95   HEMOGLOBIN g/dL 12.3   < > 12.6   HEMATOCRIT % 36.3*   < > 36.6   PLATELETS Thousands/uL 173   < > 184   NEUTROS PCT %  --   --  46   LYMPHS PCT %  --   --  35   MONOS PCT %  --   --  12   EOS PCT %  --   --  5    < > = values in this interval not displayed.     Results from last 7 days   Lab Units 02/29/24  0617 02/28/24  0603 02/27/24 2031   SODIUM mmol/L 138   < > 139   POTASSIUM mmol/L 4.1   < > 4.2   CHLORIDE mmol/L 103   < > 104   CO2 mmol/L 29   < > 28   BUN mg/dL 24   < > 24   CREATININE mg/dL 0.81   < > 0.61   ANION GAP mmol/L 6   < > 7   CALCIUM mg/dL 9.1   < > 9.3   ALBUMIN g/dL  --   --  4.1   TOTAL BILIRUBIN mg/dL  --   --  0.22    ALK PHOS U/L  --   --  66   ALT U/L  --   --  17   AST U/L  --   --  17   GLUCOSE RANDOM mg/dL 131   < > 83    < > = values in this interval not displayed.         Results from last 7 days   Lab Units 03/04/24  1600 03/04/24  1113 03/04/24  0707 03/03/24  2101 03/03/24  1558 03/03/24  1053 03/03/24  0647 03/02/24  2048 03/02/24  1618 03/02/24  1045 03/02/24  0650 03/01/24  2039   POC GLUCOSE mg/dl 137 144* 119 135 141* 138 90 179* 130 137 116 155*               Lines/Drains:  Invasive Devices       Drain  Duration             Urethral Catheter Double-lumen 16 Fr. 4 days                  Urinary Catheter:  Goal for removal: Voiding trial when ambulation improves               Imaging: Reviewed radiology reports from this admission including: CT head    Recent Cultures (last 7 days):         Last 24 Hours Medication List:   Current Facility-Administered Medications   Medication Dose Route Frequency Provider Last Rate    acetaminophen  650 mg Oral Q6H PRN Hasmukh Becker PA-C      divalproex sodium  1,000 mg Oral HS Hasmukh Becker PA-C      divalproex sodium  750 mg Oral Daily Hasmukh Becker PA-C      docusate sodium  100 mg Oral BID PRN Hasmukh Becker PA-C      enoxaparin  40 mg Subcutaneous Daily Hasmukh Becker PA-C      gabapentin  400 mg Oral TID Hasmukh Becker PA-C      ibuprofen  600 mg Oral Q6H PRN Ancelmo Mota PA-C      insulin lispro  1-5 Units Subcutaneous TID AC Hasmukh Becker PA-C      insulin lispro  1-6 Units Subcutaneous HS Hasmukh Becker PA-C      lacosamide  150 mg Oral Daily Hasmukh Becker PA-C      lacosamide  200 mg Oral HS Hasmukh Becker PA-C      levETIRAcetam  1,500 mg Oral Q12H [facility] Hasmukh Becker PA-C      levothyroxine  150 mcg Oral Daily Hasmukh Becker PA-C      lisinopril  10 mg Oral Daily Hasmukh Becker PA-C      loratadine  10 mg Oral Daily Hasmukh Becker PA-C      multivitamin stress formula  1 tablet Oral Daily Hasmukh Becker PA-C      OLANZapine  5 mg Intramuscular Q3H PRN Hasmukh Becker PA-C       pravastatin  80 mg Oral Daily With Dinner Hasmukh Becker PA-C      QUEtiapine  200 mg Oral BID Hasmukh Becker PA-C      QUEtiapine  400 mg Oral HS Hasmukh Becker PA-C      temazepam  15 mg Oral HS PRN Hasmukh Becker PA-C      zonisamide  300 mg Oral HS Hasmukh Becker PA-C          Today, Patient Was Seen By: HUNTER Main    **Please Note: This note may have been constructed using a voice recognition system.**

## 2024-03-04 NOTE — CASE MANAGEMENT
Case Management Progress Note    Patient name Jairon Oconnell  Location /-01 MRN [id number]  : 1965 Date 3/4/2024       LOS (days): 3  Geometric Mean LOS (GMLOS) (days): 3.9  Days to GMLOS:0.6        OBJECTIVE:        Current admission status: Inpatient  Preferred Pharmacy:   [facility] - [address]  [address] [id number]  Phone: [phone] Fax: [phone]    Primary Care Provider: HUNTER Brown    Primary Insurance: MEDICARE  Secondary Insurance: [place] [employer]    PROGRESS NOTE:  Cm received a call back from [facility]- she will help to find a place for this pt & the pt has Lila Rogers is his  and she is alos looking for  place for this pt.

## 2024-03-04 NOTE — PLAN OF CARE
Problem: OCCUPATIONAL THERAPY ADULT  Goal: Performs self-care activities at highest level of function for planned discharge setting.  See evaluation for individualized goals.  Description: Treatment Interventions: ADL retraining, Functional transfer training, UE strengthening/ROM, Endurance training, Patient/family training, Equipment evaluation/education, Compensatory technique education, Energy conservation, Activityengagement    See flowsheet documentation for full assessment, interventions and recommendations.   Note: Limitation: Decreased ADL status, Decreased UE strength, Decreased Safe judgement during ADL, Decreased endurance, Decreased self-care trans, Decreased high-level ADLs, Decreased cognition  Prognosis: Fair  Assessment: Patient participated in Skilled OT session this date with interventions consisting of ADL re training with the use of correct body mechnaics, Energy Conservation techniques, safety awareness and fall prevention techniques,  therapeutic activities to: increase activity tolerance, increase postural control, increase trunk control, and increase OOB/ sitting tolerance . Patient agreeable to OT treatment session, upon arrival patient was found supine in bed.  In comparison to previous session, patient with improvements in bed mobility, functional transfers, mobility and self-care skills. Patient requiring verbal cues for safety, verbal cues for correct technique, cognitive assistance to anticipate next step, and frequent rest periods. Patient continues to be functioning below baseline level, occupational performance remains limited secondary to factors listed above and increased risk for falls and injury.   From OT standpoint, recommendation at time of d/c would with moderate intensity OT resources.   Patient to benefit from continued Occupational Therapy treatment while in the hospital to address deficits as defined above and maximize level of functional independence with ADLs and  functional mobility.     Rehab Resource Intensity Level, OT: II (Moderate Resource Intensity)     Riri Pineda MS, OTR/L

## 2024-03-04 NOTE — ASSESSMENT & PLAN NOTE
Wound care recommendations appreciated: Scarring to the left lateral buttock from stage 3 pressure injury in the past. Scarring is intact and blanchable and hyperpigmented   Turn and reposition every 2 hours, frequent offloading

## 2024-03-04 NOTE — PHYSICAL THERAPY NOTE
Physical Therapy Treatment Note   Time in: 0954  Time out: 1033  Total evaluation time: 39 minutes    Patient's Name: Jairon Oconnell    Admitting Diagnosis  Ambulatory dysfunction [R26.2]    Problem List  Patient Active Problem List   Diagnosis    Cataract    Cerebral palsy (HCC)    Type 2 diabetes mellitus with diabetic neuropathy, without long-term current use of insulin (HCC)    Generalized convulsive epilepsy (HCC)    Hyperlipidemia    Essential hypertension    Acquired hypothyroidism    Osteoporosis    Scoliosis    Vitamin B12 deficiency    Vitamin D deficiency    Seborrheic dermatitis of scalp    Nearsightedness    Behavior concern in adult    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HCC)    Metabolic encephalopathy    Hyponatremia    Thrombocytopathia (HCC)    Hypomagnesemia    Social problem    Ambulatory dysfunction    Pressure ulcer of sacral region, stage 3 ([facility])    Closed nondisplaced fracture of proximal phalanx of left index finger with routine healing, subsequent encounter    Nondisplaced fracture of greater trochanter of right femur, subsequent encounter for closed fracture with routine healing       Past Medical History  Past Medical History:   Diagnosis Date    ADRIANA (acute kidney injury) ([facility]) 9/24/2019    Bacteremia due to Gram-positive bacteria 9/7/2021    Buttock wound, left, subsequent encounter 11/5/2021    COVID-19     CP (cerebral palsy) ([facility])     Depression     Diabetes ([facility])     Disease of thyroid gland     Gait disorder     Gluteal abscess 9/6/2021    Hyperlipidemia     Hypertension     Kidney failure     Kidney stones     Moderate protein-calorie malnutrition ([facility]) 12/22/2021    Osteoporosis     Pressure injury of left buttock, stage 4 ([facility]) 3/9/2022    Psychiatric disorder     Scoliosis     Seizures ([facility])     Sepsis ([facility]) 9/25/2019    Thyroid disease     UTI (urinary tract infection)        Past Surgical History  Past Surgical History:   Procedure Laterality Date    APPENDECTOMY       "IR PICC PLACEMENT SINGLE LUMEN  9/13/2021    IR PICC PLACEMENT SINGLE LUMEN  9/16/2021       PT performed at least 2 patient identifiers during session: Name and wristband.       03/04/24 1001   PT Last Visit   PT Visit Date 03/04/24   Note Type   Note Type Treatment   Pain Assessment   Pain Assessment Tool 0-10   Pain Location/Orientation Orientation: Right;Location: Hip   Pain Onset/Description Onset: Ongoing;Frequency: Constant/Continuous   Pain Rating: FLACC (Rest) - Face 0   Pain Rating: FLACC (Rest) - Legs 0   Pain Rating: FLACC (Rest) - Activity 0   Pain Rating: FLACC (Rest) - Cry 0   Pain Rating: FLACC (Rest) - Consolability 0   Score: FLACC (Rest) 0   Pain Rating: FLACC (Activity) - Face 1   Pain Rating: FLACC (Activity) - Legs 1   Pain Rating: FLACC (Activity) - Activity 0   Pain Rating: FLACC (Activity) - Cry 1   Pain Rating: FLACC (Activity) - Consolability 0   Score: FLACC (Activity) 3   Restrictions/Precautions   Weight Bearing Precautions Per Order Yes   RLE Weight Bearing Per Order WBAT  (no active hip abduction per chart review)   Other Precautions Impulsive;Cognitive;Chair Alarm;Bed Alarm;Fall Risk;Pain   General   Chart Reviewed Yes   Response to Previous Treatment Patient unable to report, no changes reported from family or staff   Family/Caregiver Present Yes  (pt's foster sister)   Cognition   Overall Cognitive Status Impaired   Arousal/Participation Alert;Responsive   Attention Attends with cues to redirect   Orientation Level Oriented to person   Memory Unable to assess   Following Commands Follows one step commands with increased time or repetition   Comments Pt agreeable to PT with encouragement.   Subjective   Subjective \"I don't want to\"   Bed Mobility   Supine to Sit 5  Supervision   Additional items Assist x 1;HOB elevated;Increased time required;Verbal cues  (cues for safety)   Transfers   Sit to Stand 4  Minimal assistance   Additional items Assist x 2;Increased time required;Verbal " cues   Stand to Sit   (CGA)   Additional items Assist x 2;Armrests;Verbal cues  (cues for hand placement)   Ambulation/Elevation   Gait pattern Improper Weight shift;Antalgic;Narrow GORGE;Forward Flexion;Decreased foot clearance;Decreased R stance   Gait Assistance   (CGA/min A)   Additional items Assist x 2;Verbal cues;Tactile cues  (+ close chair follow)   Assistive Device Rolling walker   Distance 70 ft   Stair Management Assistance Not tested   Balance   Static Sitting Fair   Dynamic Sitting Fair -   Static Standing Fair -   Dynamic Standing Poor +   Ambulatory Poor +   Endurance Deficit   Endurance Deficit Yes   Activity Tolerance   Activity Tolerance Treatment limited secondary to medical complications (Comment)  (decreased cognition)   Medical Staff Made Aware coordination of care provided with OT Riri   Nurse Made Aware Yes, RN Cee made aware of outcomes/recs   Assessment   Prognosis Fair   Problem List Decreased strength;Decreased endurance;Decreased mobility;Decreased coordination;Decreased cognition;Impaired judgement;Decreased safety awareness;Pain;Orthopedic restrictions   Assessment Pt seen for PT treatment session this date, consisting of ther act focused on functional bed mobility and transfer training from various surfaces with use of RW, neuro re-ed focused on postural control and dynamic sitting balance, and gt training on level surfaces to improve pt safety in household environment. Since previous session, pt has made very good progress in terms of improvement in ambulation and OOB mobility attempts. Pertinent barriers during this session include pain, cognitive status, and awareness. Current goals and POC established on IE remain appropriate, pt continues to have rehab potential and is making good progress towards STGs. Pt prognosis for achieving goals is fair, pending pt progress with hospitalization/medical status improvements, and indicated by attention span, supportive  family/caregivers, eye contact, and previous response to intervention. Pt limited d/t poor orientation, avoidance behaviors, and lack of ability to demonstrate mobility and/or self-care activities. Pt continues to be functioning below baseline level, and remains limited 2* factors listed above. PT will continue to see pt during current hospitalization in order to address the deficits listed above and provide interventions consistent w/ POC in effort to achieve STGs. PT recommends level 2, moderate resource intensity upon discharge.   Barriers to Discharge Other (Comment)  (pt resides in a group home setting)   Goals   Patient Goals patient could not provide a therapy-related goal due to cognitive impairment severity   LTG Expiration Date 03/11/24   Long Term Goal #1 updated goals:   Long Term Goal #2 In 7-10 days: Increase bilateral LE strength 1/2 grade to facilitate independent mobility, Perform all bed mobility tasks with MOD I to decrease caregiver burden, Perform all transfers with SBA of 1 to improve independence, Ambulate > 50 ft. with least restrictive assistive device with SBA of 1 w/o LOB and w/ normalized gait pattern 100% of the time, Increase all balance 1/2 grade to decrease risk for falls, Tolerate 4 hr OOB to faciliate upright tolerance and Complete % of the time   PT Treatment Day 1   Plan   Treatment/Interventions Functional transfer training;LE strengthening/ROM;Therapeutic exercise;Endurance training;Cognitive reorientation;Patient/family training;Bed mobility;Continued evaluation;Spoke to nursing;OT   Progress Progressing toward goals   PT Frequency 3-5x/wk   Discharge Recommendation   Rehab Resource Intensity Level, PT II (Moderate Resource Intensity)   Equipment Recommended   (continue RW use)   Additional Comments Pt's raw score on the AM-PAC Basic Mobility inpatient short form is 15, standardized score is 36.97. Patients at this level are likely to benefit from DC to post acute  rehabilitation services, however, please refer to therapist recommendation for safe DC planning.   AM-PAC Basic Mobility Inpatient   Turning in Flat Bed Without Bedrails 3   Lying on Back to Sitting on Edge of Flat Bed Without Bedrails 3   Moving Bed to Chair 3   Standing Up From Chair Using Arms 3   Walk in Room 2   Climb 3-5 Stairs With Railing 1   Basic Mobility Inpatient Raw Score 15   Basic Mobility Standardized Score 36.97   Highest Level Of Mobility   JH-HLM Goal 4: Move to chair/commode   JH-HLM Achieved 7: Walk 25 feet or more   Education   Education Provided Mobility training;Assistive device   Patient Reinforcement needed   End of Consult   Patient Position at End of Consult Bedside chair;Bed/Chair alarm activated;All needs within reach  (pt's sister at bedside visiting)       Annabel Dorado, PT, DPT

## 2024-03-04 NOTE — ASSESSMENT & PLAN NOTE
Continue Depakote DR 1000 mg p.o. nightly and 750 mg p.o. daily, Neurontin 400 mg p.o. 3 times daily, Vimpat 150 mg p.o. daily and 200 mg p.o. nightly and Keppra 1500 mg p.o. every 12 hours  Continue seizure precautions

## 2024-03-04 NOTE — OCCUPATIONAL THERAPY NOTE
Occupational Therapy Treatment Note      Jairon MADERA Kourtney    3/4/2024    Principal Problem:    Ambulatory dysfunction  Active Problems:    Type 2 diabetes mellitus with diabetic neuropathy, without long-term current use of insulin (HCC)    Generalized convulsive epilepsy (HCC)    Hyperlipidemia    Essential hypertension    Acquired hypothyroidism    Pressure ulcer of sacral region, stage 3 ([facility])      Past Medical History:   Diagnosis Date    ADRIANA (acute kidney injury) ([facility]) 9/24/2019    Bacteremia due to Gram-positive bacteria 9/7/2021    Buttock wound, left, subsequent encounter 11/5/2021    COVID-19     CP (cerebral palsy) ([facility])     Depression     Diabetes ([facility])     Disease of thyroid gland     Gait disorder     Gluteal abscess 9/6/2021    Hyperlipidemia     Hypertension     Kidney failure     Kidney stones     Moderate protein-calorie malnutrition ([facility]) 12/22/2021    Osteoporosis     Pressure injury of left buttock, stage 4 ([facility]) 3/9/2022    Psychiatric disorder     Scoliosis     Seizures ([facility])     Sepsis ([facility]) 9/25/2019    Thyroid disease     UTI (urinary tract infection)        Past Surgical History:   Procedure Laterality Date    APPENDECTOMY      IR PICC PLACEMENT SINGLE LUMEN  9/13/2021    IR PICC PLACEMENT SINGLE LUMEN  9/16/2021 03/04/24 1033   OT Last Visit   OT Visit Date 03/04/24   Note Type   Note Type Treatment   Pain Assessment   Pain Assessment Tool 0-10   Pain Location/Orientation Orientation: Right;Location: Hip   Pain Onset/Description Onset: Ongoing;Frequency: Constant/Continuous   Pain Rating: FLACC (Rest) - Face 0   Pain Rating: FLACC (Rest) - Legs 0   Pain Rating: FLACC (Rest) - Activity 0   Pain Rating: FLACC (Rest) - Cry 0   Pain Rating: FLACC (Rest) - Consolability 0   Score: FLACC (Rest) 0   Pain Rating: FLACC (Activity) - Face 1   Pain Rating: FLACC (Activity) - Legs 1   Pain Rating: FLACC (Activity) - Activity 0   Pain Rating: FLACC (Activity) - Cry 1   Pain Rating: FLACC  (Activity) - Consolability 0   Score: FLACC (Activity) 3   Restrictions/Precautions   Weight Bearing Precautions Per Order Yes   RLE Weight Bearing Per Order (S)  WBAT  (no active hip abduction per chart review)   Other Precautions Cognitive;Impulsive;Chair Alarm;Bed Alarm;Fall Risk;Pain   ADL   Where Assessed Chair   Eating Assistance 5  Supervision/Setup   Eating Deficit Setup;Increased time to complete   Grooming Assistance 5  Supervision/Setup   Grooming Deficit   (applying lotion/deodorant)   Grooming Comments with visual and verbal cues for task initiation   UB Bathing Assistance 4  Minimal Assistance   UB Bathing Deficit Verbal cueing;Supervision/safety;Increased time to complete;Chest;Right arm;Left arm;Abdomen;Perineal area   UB Bathing Comments extensive verbal and tactile cues for task initiation and thorough completion; min A for thoroughness and initiation with BUE   LB Bathing Assistance 3  Moderate Assistance   LB Bathing Deficit Right upper leg;Left upper leg;Right lower leg including foot;Left lower leg including foot;Increased time to complete;Supervision/safety;Verbal cueing   LB Bathing Comments mod A to bathe lower legs with feet d/t limited mobility and safety concerns; pt able to assist with upper legs with tactile cues for task initiation   UB Dressing Assistance 4  Minimal Assistance   UB Dressing Deficit Thread RUE;Thread LUE   UB Dressing Comments to don hospital gown   LB Dressing Assistance 2  Maximal Assistance   LB Dressing Deficit Don/doff R sock;Don/doff L sock;Thread RLE into pants;Thread LLE into pants;Pull up over hips   LB Dressing Comments max A to doff/don bilateral socks; max A to thread BLE into pants and complete CM in stance w/ min A of second staff member for balance during CM   Functional Standing Tolerance   Time 2 mins   Activity clothing management   Comments min A x1-2 for balance   Bed Mobility   Supine to Sit 5  Supervision   Additional items Assist x 1;HOB  "elevated;Increased time required;Verbal cues   Sit to Supine   (DNT; pt seated in recliner upon conclusion of session)   Additional Comments VC for bedrail utilization and proper body mechanics   Transfers   Sit to Stand 4  Minimal assistance   Additional items Assist x 2;Increased time required;Verbal cues   Stand to Sit   (CGA)   Additional items Assist x 2;Armrests;Increased time required;Verbal cues   Stand pivot 4  Minimal assistance   Additional items Assist x 2;Increased time required;Verbal cues   Additional Comments RW used; VC for safe hand placement, RW management and step sequence   Functional Mobility   Functional Mobility 4  Minimal assistance   Additional Comments min A x1-2 for 70ft w/ RW. No significant LOB observed, moderate instability with verbal encouragement cues for participation   Additional items Rolling walker   Subjective   Subjective \"I like basketball\"   Cognition   Overall Cognitive Status Impaired   Arousal/Participation Alert;Responsive   Attention Attends with cues to redirect   Orientation Level Oriented to person   Memory Unable to assess   Following Commands Follows one step commands with increased time or repetition   Comments Pt agreeable to OT session with encouragement   Activity Tolerance   Activity Tolerance Patient limited by fatigue   Medical Staff Made Aware Yes, nursing staff made aware of session outcomes   Assessment   Assessment Patient participated in Skilled OT session this date with interventions consisting of ADL re training with the use of correct body mechnaics, Energy Conservation techniques, safety awareness and fall prevention techniques,  therapeutic activities to: increase activity tolerance, increase postural control, increase trunk control, and increase OOB/ sitting tolerance . Patient agreeable to OT treatment session, upon arrival patient was found supine in bed.  In comparison to previous session, patient with improvements in bed mobility, functional " transfers, mobility and self-care skills. Patient requiring verbal cues for safety, verbal cues for correct technique, cognitive assistance to anticipate next step, and frequent rest periods. Patient continues to be functioning below baseline level, occupational performance remains limited secondary to factors listed above and increased risk for falls and injury.   From OT standpoint, recommendation at time of d/c would with moderate intensity OT resources.   Patient to benefit from continued Occupational Therapy treatment while in the hospital to address deficits as defined above and maximize level of functional independence with ADLs and functional mobility.   Plan   Treatment Interventions ADL retraining;Functional transfer training;UE strengthening/ROM;Endurance training;Patient/family training;Cognitive reorientation;Equipment evaluation/education;Compensatory technique education;Energy conservation;Activityengagement   Goal Expiration Date 03/14/24   OT Treatment Day 1   OT Frequency 3-5x/wk   Discharge Recommendation   Rehab Resource Intensity Level, OT II (Moderate Resource Intensity)   Additional Comments  The patient's raw score on the AM-PAC Daily Activity inpatient short form is 15, standardized score is 34.69, less than 39.4. Patients at this level are likely to benefit from discharge with moderate intensity OT resources. Please refer to the recommendation of the Occupational Therapist for safe discharge planning.   Additional Comments 2 coordination of care with PT Apple   [facility] Daily Activity Inpatient   Lower Body Dressing 2   Bathing 2   Toileting 2   Upper Body Dressing 3   Grooming 3   Eating 3   Daily Activity Raw Score 15   Daily Activity Standardized Score (Calc for Raw Score >=11) 34.69   AM-PAC Applied Cognition Inpatient   Following a Speech/Presentation 2   Understanding Ordinary Conversation 3   Taking Medications 2   Remembering Where Things Are Placed or Put Away 1   Remembering List of  4-5 Errands 1   Taking Care of Complicated Tasks 1   Applied Cognition Raw Score 10   Applied Cognition Standardized Score 24.98     All needs met, call bell within reach, chair alarm on  Riri Pineda MS, OTR/L

## 2024-03-04 NOTE — PLAN OF CARE
Problem: PAIN - ADULT  Goal: Verbalizes/displays adequate comfort level or baseline comfort level  Description: Interventions:  - Encourage patient to monitor pain and request assistance  - Assess pain using appropriate pain scale  - Administer analgesics based on type and severity of pain and evaluate response  - Implement non-pharmacological measures as appropriate and evaluate response  - Consider cultural and social influences on pain and pain management  - Notify physician/advanced practitioner if interventions unsuccessful or patient reports new pain  Outcome: Progressing     Problem: INFECTION - ADULT  Goal: Absence or prevention of progression during hospitalization  Description: INTERVENTIONS:  - Assess and monitor for signs and symptoms of infection  - Monitor lab/diagnostic results  - Monitor all insertion sites, i.e. indwelling lines, tubes, and drains  - Monitor endotracheal if appropriate and nasal secretions for changes in amount and color  - [place] appropriate cooling/warming therapies per order  - Administer medications as ordered  - Instruct and encourage patient and family to use good hand hygiene technique  - Identify and instruct in appropriate isolation precautions for identified infection/condition  Outcome: Progressing  Goal: Absence of fever/infection during neutropenic period  Description: INTERVENTIONS:  - Monitor WBC    Outcome: Progressing     Problem: SAFETY ADULT  Goal: Patient will remain free of falls  Description: INTERVENTIONS:  - Educate patient/family on patient safety including physical limitations  - Instruct patient to call for assistance with activity   - Consult OT/PT to assist with strengthening/mobility   - Keep Call bell within reach  - Keep bed low and locked with side rails adjusted as appropriate  - Keep care items and personal belongings within reach  - Initiate and maintain comfort rounds  - Make Fall Risk Sign visible to staff  - Offer Toileting every 2 Hours, in  advance of need  - Initiate/Maintain bed alarm  - Obtain necessary fall risk management equipment  - Apply yellow socks and bracelet for high fall risk patients  - Consider moving patient to room near nurses station  Outcome: Progressing  Goal: Maintain or return to baseline ADL function  Description: INTERVENTIONS:  -  Assess patient's ability to carry out ADLs; assess patient's baseline for ADL function and identify physical deficits which impact ability to perform ADLs (bathing, care of mouth/teeth, toileting, grooming, dressing, etc.)  - Assess/evaluate cause of self-care deficits   - Assess range of motion  - Assess patient's mobility; develop plan if impaired  - Assess patient's need for assistive devices and provide as appropriate  - Encourage maximum independence but intervene and supervise when necessary  - Involve family in performance of ADLs  - Assess for home care needs following discharge   - Consider OT consult to assist with ADL evaluation and planning for discharge  - Provide patient education as appropriate  Outcome: Progressing  Goal: Maintains/Returns to pre admission functional level  Description: INTERVENTIONS:  - Perform AM-PAC 6 Click Basic Mobility/ Daily Activity assessment daily.  - Set and communicate daily mobility goal to care team and patient/family/caregiver.   - Collaborate with rehabilitation services on mobility goals if consulted  - Perform Range of Motion 3 times a day.  - Reposition patient every 2 hours.  - Dangle patient 3 times a day  - Stand patient 3 times a day  - Ambulate patient 3 times a day  - Out of bed to chair 3 times a day   - Out of bed for meals 3 times a day  - Out of bed for toileting  - Record patient progress and toleration of activity level   Outcome: Progressing     Problem: DISCHARGE PLANNING  Goal: Discharge to home or other facility with appropriate resources  Description: INTERVENTIONS:  - Identify barriers to discharge w/patient and caregiver  -  Arrange for needed discharge resources and transportation as appropriate  - Identify discharge learning needs (meds, wound care, etc.)  - Arrange for interpretive services to assist at discharge as needed  - Refer to Case Management Department for coordinating discharge planning if the patient needs post-hospital services based on physician/advanced practitioner order or complex needs related to functional status, cognitive ability, or social support system  Outcome: Progressing     Problem: Knowledge Deficit  Goal: Patient/family/caregiver demonstrates understanding of disease process, treatment plan, medications, and discharge instructions  Description: Complete learning assessment and assess knowledge base.  Interventions:  - Provide teaching at level of understanding  - Provide teaching via preferred learning methods  Outcome: Progressing     Problem: Prexisting or High Potential for Compromised Skin Integrity  Goal: Skin integrity is maintained or improved  Description: INTERVENTIONS:  - Identify patients at risk for skin breakdown  - Assess and monitor skin integrity  - Assess and monitor nutrition and hydration status  - Monitor labs   - Assess for incontinence   - Turn and reposition patient  - Assist with mobility/ambulation  - Relieve pressure over bony prominences  - Avoid friction and shearing  - Provide appropriate hygiene as needed including keeping skin clean and dry  - Evaluate need for skin moisturizer/barrier cream  - Collaborate with interdisciplinary team   - Patient/family teaching  - Consider wound care consult   Outcome: Progressing

## 2024-03-04 NOTE — CASE MANAGEMENT
Case Management Discharge Planning Note    Patient name Jairon Oconnell  Location /- MRN [id number]  : 1965 Date 3/4/2024       Current Admission Date: 3/1/2024  Current Admission Diagnosis:Ambulatory dysfunction   Patient Active Problem List    Diagnosis Date Noted    Nondisplaced fracture of greater trochanter of right femur, subsequent encounter for closed fracture with routine healing 2024    Closed nondisplaced fracture of proximal phalanx of left index finger with routine healing, subsequent encounter 10/30/2023    Pressure ulcer of sacral region, stage 3 (HCC) 2023    Ambulatory dysfunction 2022    Social problem 2021    Hypomagnesemia 2021    Thrombocytopathia ([facility]) 2020    Hyponatremia 2019    Metabolic encephalopathy 2019    Seborrheic dermatitis of scalp 2019    Nearsightedness 2019    Behavior concern in adult 2019    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HCC) 2019    Vitamin B12 deficiency 2017    Hyperlipidemia 2016    Vitamin D deficiency 2016    Type 2 diabetes mellitus with diabetic neuropathy, without long-term current use of insulin ([facility]) 06/15/2016    Acquired hypothyroidism 06/15/2016    Cataract 2013    Cerebral palsy ([facility]) 2013    Generalized convulsive epilepsy ([facility]) 2013    Essential hypertension 2013    Osteoporosis 2013    Scoliosis 2013      LOS (days): 3  Geometric Mean LOS (GMLOS) (days): 3.9  Days to GMLOS:0.2     OBJECTIVE:  Risk of Unplanned Readmission Score: 20.15         Current admission status: Inpatient   Preferred Pharmacy:   [facility] [place] [facility] - [address]  [address] [address]  Phone: [phone] Fax: [phone]    Primary Care Provider: HUNTER Brown    Primary Insurance: MEDICARE  [place] [employer] [place]    DISCHARGE DETAILS:    Discharge planning  discussed with:: cm spoke with Madeline Arvizu at 9:45 am &  missed Lila calls- cm did email her my email address & phone #  Freedom of Choice: Yes  Comments - Freedom of Choice: pt needs rehab - permission was given to send a blanket referral for snf rehab  CM contacted family/caregiver?: Yes             Contacts  Patient Contacts: Madeline Arvizu  Relationship to Patient:: Other (Comment) (Case manger from [facility])  Contact Method: Phone  Phone Number: [phone]  Reason/Outcome: Discharge Planning    Requested Home Health Care         Is the patient interested in C at discharge?: No    DME Referral Provided  Referral made for DME?: No    Other Referral/Resources/Interventions Provided:  Interventions: Short Term Rehab  Referral Comments: referrals sent via maribel     cm snet  an email as requested - she wants to send a link to have a meeting- pt was able to walk 70ft today wiht some assistance- pt may be able to go back to the group home with [facility]         Treatment Team Recommendation:  (d/c plan tbd- tbd)

## 2024-03-04 NOTE — PLAN OF CARE
Problem: PHYSICAL THERAPY ADULT  Goal: Performs mobility at highest level of function for planned discharge setting.  See evaluation for individualized goals.  Description: Treatment/Interventions: Functional transfer training, LE strengthening/ROM, Elevations, Therapeutic exercise, Endurance training, Cognitive reorientation, Patient/family training, Equipment eval/education, Bed mobility, Spoke to nursing, Spoke to case management          See flowsheet documentation for full assessment, interventions and recommendations.  Outcome: Progressing  Note: Prognosis: Fair  Problem List: Decreased strength, Decreased endurance, Decreased mobility, Decreased coordination, Decreased cognition, Impaired judgement, Decreased safety awareness, Pain, Orthopedic restrictions  Assessment: Pt seen for PT treatment session this date, consisting of ther act focused on functional bed mobility and transfer training from various surfaces with use of RW, neuro re-ed focused on postural control and dynamic sitting balance, and gt training on level surfaces to improve pt safety in household environment. Since previous session, pt has made very good progress in terms of improvement in ambulation and OOB mobility attempts. Pertinent barriers during this session include pain, cognitive status, and awareness. Current goals and POC established on IE remain appropriate, pt continues to have rehab potential and is making good progress towards STGs. Pt prognosis for achieving goals is fair, pending pt progress with hospitalization/medical status improvements, and indicated by attention span, supportive family/caregivers, eye contact, and previous response to intervention. Pt limited d/t poor orientation, avoidance behaviors, and lack of ability to demonstrate mobility and/or self-care activities. Pt continues to be functioning below baseline level, and remains limited 2* factors listed above. PT will continue to see pt during current  hospitalization in order to address the deficits listed above and provide interventions consistent w/ POC in effort to achieve STGs. PT recommends level 2, moderate resource intensity upon discharge.  Barriers to Discharge: Other (Comment) (pt resides in a group home setting)     Rehab Resource Intensity Level, PT: II (Moderate Resource Intensity)    See flowsheet documentation for full assessment.

## 2024-03-04 NOTE — ASSESSMENT & PLAN NOTE
Lab Results   Component Value Date    HGBA1C 5.5 11/27/2023       Recent Labs     03/03/24  2101 03/04/24  0707 03/04/24  1113 03/04/24  1600   POCGLU 135 119 144* 137         Blood Sugar Average: Last 72 hrs:  (P) 134.25    Continue diabetic diet   Continue fingerstick blood sugar with sliding scale coverage  Hypoglycemia protocol

## 2024-03-04 NOTE — PLAN OF CARE
Problem: SAFETY ADULT  Goal: Patient will remain free of falls  Description: INTERVENTIONS:  - Educate patient/family on patient safety including physical limitations  - Instruct patient to call for assistance with activity   - Consult OT/PT to assist with strengthening/mobility   - Keep Call bell within reach  - Keep bed low and locked with side rails adjusted as appropriate  - Keep care items and personal belongings within reach  - Initiate and maintain comfort rounds  - Make Fall Risk Sign visible to staff  - Offer Toileting every 2 Hours, in advance of need  - Initiate/Maintain bed/chair alarm  - Apply yellow socks and bracelet for high fall risk patients  - Consider moving patient to room near nurses station  Outcome: Progressing       Problem: Knowledge Deficit  Goal: Patient/family/caregiver demonstrates understanding of disease process, treatment plan, medications, and discharge instructions  Description: Complete learning assessment and assess knowledge base.  Interventions:  - Provide teaching at level of understanding  - Provide teaching via preferred learning methods  Outcome: Progressing

## 2024-03-04 NOTE — ASSESSMENT & PLAN NOTE
Patient has nonoperative right hip greater trochanter fracture was seen and evaluated by orthopedic surgery on the previous admission  He was discharged to a skilled nursing facility agitated, broke a chair, and was reported to become aggressive with staff.  The facility will now not take him back  Seen and evaluated by PT OT medically cleared for discharge to skilled nursing facility  Case management following, input appreciated

## 2024-03-05 LAB
GLUCOSE SERPL-MCNC: 102 MG/DL (ref 65–140)
GLUCOSE SERPL-MCNC: 118 MG/DL (ref 65–140)
GLUCOSE SERPL-MCNC: 118 MG/DL (ref 65–140)
GLUCOSE SERPL-MCNC: 191 MG/DL (ref 65–140)

## 2024-03-05 PROCEDURE — G0426 INPT/ED TELECONSULT50: HCPCS | Performed by: PSYCHIATRY & NEUROLOGY

## 2024-03-05 PROCEDURE — 93005 ELECTROCARDIOGRAM TRACING: CPT

## 2024-03-05 PROCEDURE — 82948 REAGENT STRIP/BLOOD GLUCOSE: CPT

## 2024-03-05 PROCEDURE — 99233 SBSQ HOSP IP/OBS HIGH 50: CPT | Performed by: NURSE PRACTITIONER

## 2024-03-05 RX ADMIN — GABAPENTIN 400 MG: 400 CAPSULE ORAL at 15:59

## 2024-03-05 RX ADMIN — LISINOPRIL 10 MG: 10 TABLET ORAL at 09:39

## 2024-03-05 RX ADMIN — QUETIAPINE FUMARATE 200 MG: 150 TABLET, EXTENDED RELEASE ORAL at 15:59

## 2024-03-05 RX ADMIN — GABAPENTIN 400 MG: 400 CAPSULE ORAL at 21:15

## 2024-03-05 RX ADMIN — QUETIAPINE FUMARATE 200 MG: 150 TABLET, EXTENDED RELEASE ORAL at 10:35

## 2024-03-05 RX ADMIN — LACOSAMIDE 200 MG: 150 TABLET ORAL at 21:14

## 2024-03-05 RX ADMIN — ENOXAPARIN SODIUM 40 MG: 40 INJECTION SUBCUTANEOUS at 09:38

## 2024-03-05 RX ADMIN — LEVOTHYROXINE SODIUM 150 MCG: 75 TABLET ORAL at 09:40

## 2024-03-05 RX ADMIN — QUETIAPINE 400 MG: 400 TABLET, FILM COATED, EXTENDED RELEASE ORAL at 21:15

## 2024-03-05 RX ADMIN — LEVETIRACETAM 1500 MG: 500 TABLET, FILM COATED ORAL at 09:39

## 2024-03-05 RX ADMIN — INSULIN LISPRO 2 UNITS: 100 INJECTION, SOLUTION INTRAVENOUS; SUBCUTANEOUS at 22:22

## 2024-03-05 RX ADMIN — LORATADINE 10 MG: 10 TABLET ORAL at 09:40

## 2024-03-05 RX ADMIN — DIVALPROEX SODIUM 750 MG: 250 TABLET, DELAYED RELEASE ORAL at 09:40

## 2024-03-05 RX ADMIN — B-COMPLEX W/ C & FOLIC ACID TAB 1 TABLET: TAB at 09:38

## 2024-03-05 RX ADMIN — LEVETIRACETAM 1500 MG: 500 TABLET, FILM COATED ORAL at 21:15

## 2024-03-05 RX ADMIN — GABAPENTIN 400 MG: 400 CAPSULE ORAL at 09:40

## 2024-03-05 RX ADMIN — ZONISAMIDE 300 MG: 100 CAPSULE ORAL at 21:14

## 2024-03-05 RX ADMIN — PRAVASTATIN SODIUM 80 MG: 40 TABLET ORAL at 15:59

## 2024-03-05 RX ADMIN — DIVALPROEX SODIUM 1000 MG: 500 TABLET, DELAYED RELEASE ORAL at 21:14

## 2024-03-05 RX ADMIN — LACOSAMIDE 150 MG: 150 TABLET ORAL at 09:40

## 2024-03-05 NOTE — PLAN OF CARE
Problem: PAIN - ADULT  Goal: Verbalizes/displays adequate comfort level or baseline comfort level  Description: Interventions:  - Encourage patient to monitor pain and request assistance  - Assess pain using appropriate pain scale  - Administer analgesics based on type and severity of pain and evaluate response  - Implement non-pharmacological measures as appropriate and evaluate response  - Consider cultural and social influences on pain and pain management  - Notify physician/advanced practitioner if interventions unsuccessful or patient reports new pain  Outcome: Progressing     Problem: INFECTION - ADULT  Goal: Absence or prevention of progression during hospitalization  Description: INTERVENTIONS:  - Assess and monitor for signs and symptoms of infection  - Monitor lab/diagnostic results  - Monitor all insertion sites, i.e. indwelling lines, tubes, and drains  - Monitor endotracheal if appropriate and nasal secretions for changes in amount and color  - [place] appropriate cooling/warming therapies per order  - Administer medications as ordered  - Instruct and encourage patient and family to use good hand hygiene technique  - Identify and instruct in appropriate isolation precautions for identified infection/condition  Outcome: Progressing  Goal: Absence of fever/infection during neutropenic period  Description: INTERVENTIONS:  - Monitor WBC    Outcome: Progressing     Problem: SAFETY ADULT  Goal: Patient will remain free of falls  Description: INTERVENTIONS:  - Educate patient/family on patient safety including physical limitations  - Instruct patient to call for assistance with activity   - Consult OT/PT to assist with strengthening/mobility   - Keep Call bell within reach  - Keep bed low and locked with side rails adjusted as appropriate  - Keep care items and personal belongings within reach  - Initiate and maintain comfort rounds  - Make Fall Risk Sign visible to staff  - Offer Toileting every 1 Hours,  in advance of need  - Initiate/Maintain bed chairalarm  - Obtain necessary fall risk management equipment: bed   - Apply yellow socks and bracelet for high fall risk patients  - Consider moving patient to room near nurses station  Outcome: Progressing  Goal: Maintain or return to baseline ADL function  Description: INTERVENTIONS:  -  Assess patient's ability to carry out ADLs; assess patient's baseline for ADL function and identify physical deficits which impact ability to perform ADLs (bathing, care of mouth/teeth, toileting, grooming, dressing, etc.)  - Assess/evaluate cause of self-care deficits   - Assess range of motion  - Assess patient's mobility; develop plan if impaired  - Assess patient's need for assistive devices and provide as appropriate  - Encourage maximum independence but intervene and supervise when necessary  - Involve family in performance of ADLs  - Assess for home care needs following discharge   - Consider OT consult to assist with ADL evaluation and planning for discharge  - Provide patient education as appropriate  Outcome: Progressing  Goal: Maintains/Returns to pre admission functional level  Description: INTERVENTIONS:  - Perform AM-PAC 6 Click Basic Mobility/ Daily Activity assessment daily.  - Set and communicate daily mobility goal to care team and patient/family/caregiver.   - Collaborate with rehabilitation services on mobility goals if consulted  - Perform Range of Motion 3 times a day.  - Reposition patient every 2 hours.  - Dangle patient 3 times a day  - Stand patient 3 times a day  - Ambulate patient 3 times a day  - Out of bed to chair 3 times a day   - Out of bed for meals 3 times a day  - Out of bed for toileting  - Record patient progress and toleration of activity level   Outcome: Progressing     Problem: DISCHARGE PLANNING  Goal: Discharge to home or other facility with appropriate resources  Description: INTERVENTIONS:  - Identify barriers to discharge w/patient and  caregiver  - Arrange for needed discharge resources and transportation as appropriate  - Identify discharge learning needs (meds, wound care, etc.)  - Arrange for interpretive services to assist at discharge as needed  - Refer to Case Management Department for coordinating discharge planning if the patient needs post-hospital services based on physician/advanced practitioner order or complex needs related to functional status, cognitive ability, or social support system  Outcome: Progressing

## 2024-03-05 NOTE — CASE MANAGEMENT
Case Management Progress Note    Patient name Jairon Oconnell  Location /-01 MRN [id number]  : 1965 Date 3/5/2024       LOS (days): 4  Geometric Mean LOS (GMLOS) (days): 3.9  Days to GMLOS:-0.6        OBJECTIVE:        Current admission status: Inpatient  Preferred Pharmacy:   [facility] [place] [facility] - [address]  [address] [address]  Phone: [phone] Fax: [phone]    Primary Care Provider: HUNTER Brown    Primary Insurance: MEDICARE  [place] [employer]    PROGRESS NOTE:  Angelito received a call from Griselda at [facility], she was not able to see the determination letters [facility] - angelito faxed the letters to her  fax# [phone]- angelito will continue to work on a safe d/c plan.

## 2024-03-05 NOTE — CASE MANAGEMENT
Case Management Discharge Planning Note    Patient name Jairon Oconnell  Location /- MRN [id number]  : 1965 Date 3/5/2024       Current Admission Date: 3/1/2024  Current Admission Diagnosis:Ambulatory dysfunction   Patient Active Problem List    Diagnosis Date Noted    Nondisplaced fracture of greater trochanter of right femur, subsequent encounter for closed fracture with routine healing 2024    Closed nondisplaced fracture of proximal phalanx of left index finger with routine healing, subsequent encounter 10/30/2023    Pressure ulcer of sacral region, stage 3 (HCC) 2023    Ambulatory dysfunction 2022    Social problem 2021    Hypomagnesemia 2021    Thrombocytopathia ([facility]) 2020    Hyponatremia 2019    Metabolic encephalopathy 2019    Seborrheic dermatitis of scalp 2019    Nearsightedness 2019    Behavior concern in adult 2019    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HCC) 2019    Vitamin B12 deficiency 2017    Hyperlipidemia 2016    Vitamin D deficiency 2016    Type 2 diabetes mellitus with diabetic neuropathy, without long-term current use of insulin ([facility]) 06/15/2016    Acquired hypothyroidism 06/15/2016    Cataract 2013    Cerebral palsy ([facility]) 2013    Generalized convulsive epilepsy ([facility]) 2013    Essential hypertension 2013    Osteoporosis 2013    Scoliosis 2013      LOS (days): 4  Geometric Mean LOS (GMLOS) (days): 3.9  Days to GMLOS:-0.4     OBJECTIVE:  Risk of Unplanned Readmission Score: 20.34         Current admission status: Inpatient   Preferred Pharmacy:   [facility] [place] [facility] - [address]  [address] [address]  Phone: [phone] Fax: [phone]    Primary Care Provider: HUNTER Brown    Primary Insurance: MEDICARE  [place] [employer]    DISCHARGE DETAILS:    Discharge  "planning discussed with:: cm spoke with Lila Rogers ( support coordiantor )  Freedom of Choice: Yes  Comments - Freedom of Choice: pt needs rehab and placement- additional referrals have been sent via maribel  CM contacted family/caregiver?: Yes             Contacts  Patient Contacts: Lila Rogers  Relationship to Patient:: Other (Comment) (support coordiantor)  Contact Method: Phone  Phone Number: [phone]  Reason/Outcome: Discharge Planning    Requested Home Health Care         Is the patient interested in HHC at discharge?: No    DME Referral Provided  Referral made for DME?: No    Other Referral/Resources/Interventions Provided:  Interventions: SNF  Referral Comments: referrals sent via maribel -additonal referrals sent- cm was made aware today that the pt is not able to return to his \" life sharing home\"  cm was requested to attend a teams meetiing with [facility] tomorrow to discuss d/c plan- pt was optioned the last admission    Would you like to participate in our [facility] service program?  : No - Declined    Treatment Team Recommendation:  (d/c plan TBD- BLS)                                                                   "

## 2024-03-05 NOTE — CONSULTS
REQUIRED DOCUMENTATION:     1. This service was provided via Telemedicine.  2. Provider located at [place], [place].  3. TeleMed provider: Renee Maza PA-C.  4. Identify all parties in room with patient during tele consult:  Jairon Oconnell, Renee Maza PA-C  5.Patient was then informed that this was a Telemedicine visit and that the exam was being conducted confidentially over secure lines. My office door was closed. No one else was in the room.  Patient acknowledged consent and understanding of privacy and security of the Telemedicine visit, and gave us permission to have the assistant stay in the room in order to assist with the history and to conduct the exam.  I informed the patient that I have reviewed their record in Epic and presented the opportunity for them to ask any questions regarding the visit today.  The patient agreed to participate.           Consultation - Behavioral Health     Identification Data: Jairon Oconnlel 58 y.o. male MRN: [id number]  Unit/Bed#: -01 Encounter: [id number]    03/05/24  12:42 PM    Inpatient consult to Psychiatry  Consult performed by: Renee Maza PA-C  Consult ordered by: HUNTER Main        Physician Requesting Consult: Ewa Espinosa MD  Principal Problem:Ambulatory dysfunction    Reason for Consult:  social problem, hx of aggression, behavioral disturbances    History of Present Illness     Jairon Oconnell is a 58 y.o. male with a history of cerebral palsy, AAA, depression, T2DM, HTN, seizures, hx behavioral disturbance and aggression who was admitted to the medical service on 3/1/2024 due to behavioral disturbance. Psychiatric consultation was requested to re-evaluate medications, assess behaviors prior to dc placement    Per initial ED note: 59 yo sent in for aggressive behavior at short term rehab. He was admitted to [facility] earlier this month for hip fracture which is being managed non-op. According to patient's  his prn  "anxiety/agitation medications were stopped in order for him to be accepted at the facility. He does occasionally have aggressive behavior but according to  they are usually very minor outbursts.      Per initial H&P: Jairon Oconnell is a 58 y.o. male who presents with right hip pain.  Patient was previously seen and admitted to our facility to [facility] for right greater trochanter femur fracture was seen and evaluated by PT OT as well as orthopedics during that admission.  Patient was deemed medically cleared for discharge to skilled nursing facility yesterday had an outburst he became violent with staff and broke a chair.  Patient was originally admitted to [facility] subsequently transferred here for \"buy back\" as patient is going to be a difficult placement issue due to his fracture as well as behavioral issues. At time of exam patient is a very poor historian, he is not able to provide any real history and history is obtained through review of his records.    Per chart review: Psychiatric medications are as follows: Seroquel  mg QHS & Seroquel  mg BID, Gabapentin 400 mg TID, Depakote  mg daily & Depakote DR 1,000 mg QHS. Was admitted to medical earlier this month after suffering a fall at his group home which results were significant for a R greater trochanter fracture comminuted and displaced. Ortho was consulted which determined there was no need for further surgical intervention. PT/OT consulted and patient was recommended for rehab placement. However, as mentioned above, was discharged & later brought back to the hospital and now the rehab has been refusing to take him back. During this hospitalization, no reports of behavioral issues or disturbances and has been relatively calm and cooperative.       On initial psychiatric evaluation Jairon was seen resting at bedside chair comfortably. He is slightly restless, moving back and fourth with some orofacial dyskinesia. Throughout " "interview very difficult to engage with and interact, but otherwise in good spirits overall. He shows writer excitedly his stuffed animal and fidget toy. He tells me his mood is \"good\" today and denies worsening mood changes such as depression or anger. He is mostly non-verbal offering \"yes\" or \"no\" head movements. At times, clutching his R hip, saying, \"ow\" and stares at writer. He states he does not remember when he was at STR and became aggressive. He denies thoughts to harm himself or others. He does not intend to become aggressive with others. Interview was limited, most of psychiatric ROS was unable to be completed due to limited understanding and responses. However at present confidently denies SI/HI/AVH. When questioned if he has been taking his medications appropriately he nods, \"yes.\"       Psychiatric Review Of Systems:    See HPI. Difficult to assess due to non-verbal/limited understanding.    Historical Information     Past Psychiatric History:     Past Inpatient Psychiatric Treatment:   No history of past inpatient psychiatric admissions  Past Outpatient Psychiatric Treatment:    No history of past outpatient psychiatric treatment  Past Suicide Attempts: no  Past Violent Behavior: yes  Past Psychiatric Medication Trials:  patient unsure     Substance Abuse History:    Social History       Tobacco History       Smoking Status  Never      Smokeless Tobacco Use  Never              Alcohol History       Alcohol Use Status  Never              Drug Use       Drug Use Status  No              Sexual Activity       Sexually Active  Not Currently              Activities of Daily Living    Not Asked                   I am unable to assess the patient for substance use within the past 12 months as they are unable or unwilling to answer - patient non-verbal    Alcohol use: unable to obtain  Recreational drug use:   Cocaine:  unable to obtain  Heroin:  unable to obtain  Marijuana:  unable to obtain  Other " drugs: none   UDS negative    Family Psychiatric History:     Psychiatric Illness:  no family history of psychiatric illness  Substance Abuse:  no family history of substance abuse  Suicide Attempts:  no family history of suicide attempts    Social History:    Education: unable to obtain  Marital History: single  Children: none  Living Arrangement: The patient  is living in a group home  Occupational History: unemployed  Legal History: none      Past Medical History:   Diagnosis Date    ADRIANA (acute kidney injury) ([facility]) 9/24/2019    Bacteremia due to Gram-positive bacteria 9/7/2021    Buttock wound, left, subsequent encounter 11/5/2021    COVID-19     CP (cerebral palsy) ([facility])     Depression     Diabetes ([facility])     Disease of thyroid gland     Gait disorder     Gluteal abscess 9/6/2021    Hyperlipidemia     Hypertension     Kidney failure     Kidney stones     Moderate protein-calorie malnutrition ([facility]) 12/22/2021    Osteoporosis     Pressure injury of left buttock, stage 4 ([facility]) 3/9/2022    Psychiatric disorder     Scoliosis     Seizures ([facility])     Sepsis ([facility]) 9/25/2019    Thyroid disease     UTI (urinary tract infection)      Past Surgical History:   Procedure Laterality Date    APPENDECTOMY      IR PICC PLACEMENT SINGLE LUMEN  9/13/2021    IR PICC PLACEMENT SINGLE LUMEN  9/16/2021         Medical Review Of Systems:    Pertinent items are noted in HPI.    Allergies:    Allergies   Allergen Reactions    Depakote [Valproic Acid] Other (See Comments)     Must have brand name Depakote    Erythromycin Other (See Comments)     Unknown per pt    Penicillins Other (See Comments)     Unknown per pt       Medications:   All current active medications have been reviewed.    Objective     Vital signs in last 24 hours:    Temp:  [96.8 °F (36 °C)-97.4 °F (36.3 °C)] 96.9 °F (36.1 °C)  HR:  [76-80] 80  Resp:  [18-19] 19  BP: (118-137)/(67-72) 119/72    Intake/Output Summary (Last 24 hours) at 3/5/2024 1242  Last data filed at  3/5/2024 1207  Gross per 24 hour   Intake 840 ml   Output 900 ml   Net -60 ml       Mental Status Evaluation:    Appearance:  age appropriate, casually dressed, dressed appropriately, appears older than stated age, dressed in hospital attire   Behavior:  cooperative, guarded, restless   Speech:  scant, delayed, soft, non-verbal   Mood:  euthymic   Affect:  appropriate   Language: unable to assess   Thought Process:  poverty of thought   Associations: concrete associations   Thought Content:  no overt delusions   Perceptual Disturbances: no auditory hallucinations, no visual hallucinations   Risk Potential: Suicidal ideation - None at present  Homicidal ideation - None at present  Potential for aggression - No   Sensorium:  oriented to person and place   Memory:  recent and remote memory: unable to assess due to lack of cooperation   Consciousness:  alert and awake    Attention/Concentration: attention span and concentration appear shorter than expected for age   Intellect: unable to assess due to lack of cooperation   Fund of Knowledge: awareness of current events: unable to assess due to lack of cooperation   Insight:  limited   Judgment: limited   Muscle Strength Muscle Tone: normal  normal   Gait/Station: Sitting in chair by bed   Motor Activity: abnormal movement noted: dyskinetic oral movements present     Laboratory Results: I have personally reviewed all pertinent laboratory/tests results.  Most Recent Labs:   Lab Results   Component Value Date    WBC 6.96 02/29/2024    RBC 3.79 (L) 02/29/2024    HGB 12.3 02/29/2024    HCT 36.3 (L) 02/29/2024     02/29/2024    RDW 13.8 02/29/2024    NEUTROABS 3.70 02/27/2024    SODIUM 138 02/29/2024    K 4.1 02/29/2024     02/29/2024    CO2 29 02/29/2024    BUN 24 02/29/2024    CREATININE 0.81 02/29/2024    GLUC 131 02/29/2024    GLUF 111 (H) 11/03/2023    CALCIUM 9.1 02/29/2024    AST 17 02/27/2024    ALT 17 02/27/2024    ALKPHOS 66 02/27/2024    TP 7.1  02/27/2024    ALB 4.1 02/27/2024    TBILI 0.22 02/27/2024    CHOLESTEROL 158 11/09/2022    HDL 69 11/09/2022    TRIG 77 11/09/2022    LDLCALC 74 11/09/2022    NONHDLC 91 03/04/2021    VALPROICTOT 79 11/03/2023    AMMONIA 54 07/03/2023    RMI2MWURERLW 5.533 (H) 02/27/2024    FREET4 1.26 02/23/2023    HGBA1C 5.5 11/27/2023     11/09/2022     Last Laboratory Results with Depakote and/or Tegretol levels:   Lab Results   Component Value Date    WBC 6.96 02/29/2024    RBC 3.79 (L) 02/29/2024    HGB 12.3 02/29/2024    HCT 36.3 (L) 02/29/2024     02/29/2024    RDW 13.8 02/29/2024    NEUTROABS 3.70 02/27/2024    SODIUM 138 02/29/2024    K 4.1 02/29/2024     02/29/2024    CO2 29 02/29/2024    BUN 24 02/29/2024    CREATININE 0.81 02/29/2024    GLUC 131 02/29/2024    GLUF 111 (H) 11/03/2023    CALCIUM 9.1 02/29/2024    AST 17 02/27/2024    ALT 17 02/27/2024    ALKPHOS 66 02/27/2024    TP 7.1 02/27/2024    ALB 4.1 02/27/2024    TBILI 0.22 02/27/2024    VALPROICTOT 79 11/03/2023       Imaging Studies: XR hip/pelv 2-3 vws right if performed    Result Date: 2/28/2024  Narrative: XR HIP/PELV 2-3 VWS RIGHT W PELVIS IF PERFORMED INDICATION: injury. COMPARISON: CT and right hip study 2/4/2024. FINDINGS: Right greater trochanteric comminuted slightly displaced fracture is again seen. Right femoral head osteonecrosis with partial collapse. Bilateral mild degenerative hip joints. No lytic or blastic osseous lesion. Unremarkable soft tissues. Degenerative lower lumbar spine     Impression: Right hip fracture and osteonecrosis again noted. The study was marked in EPIC for immediate notification. Workstation performed: [id number]     CT head without contrast    Result Date: 2/27/2024  Narrative: CT BRAIN - WITHOUT CONTRAST INDICATION:   ams. COMPARISON: 1/26/2024. TECHNIQUE:  CT examination of the brain was performed.  Multiplanar 2D reformatted images were created from the source data. Radiation dose length product (DLP)  for this visit:  837 mGy-cm .  This examination, like all CT scans performed in the [facility], was performed utilizing techniques to minimize radiation dose exposure, including the use of iterative reconstruction and automated exposure control. IMAGE QUALITY:  Diagnostic. FINDINGS: PARENCHYMA:  No intracranial mass, mass effect or midline shift. No CT signs of acute infarction.  No acute parenchymal hemorrhage. VENTRICLES AND EXTRA-AXIAL SPACES:  Normal for the patient's age. VISUALIZED ORBITS: Normal visualized orbits. PARANASAL SINUSES: Normal visualized paranasal sinuses. CALVARIUM AND EXTRACRANIAL SOFT TISSUES:  Normal.     Impression: No acute intracranial abnormality. Workstation performed: [id number]       Code Status: Level 1 - Full Code  Advance Directive and Living Will:       Power of :      Assessment/Plan     Principal Problem:    Ambulatory dysfunction  Active Problems:    Type 2 diabetes mellitus with diabetic neuropathy, without long-term current use of insulin (HCC)    Generalized convulsive epilepsy (HCC)    Hyperlipidemia    Essential hypertension    Acquired hypothyroidism    Pressure ulcer of sacral region, stage 3 (HCC)      Assessment:    Jairon Oconnell is a 58 y.o. male with a history of cerebral palsy, AAA, depression, T2DM, HTN, seizures, hx behavioral disturbance and aggression who was admitted to the medical service on 3/1/2024 due to behavioral disturbance. He is seen via telemedicine sitting in chair, appropriately and in generally good spirits. Interview is very limited and most of information is obtained through chart review. Currently, he has been in control through this hospitalization. No need for either chemical or mechanical restraints thus far. Right now there is no indication for inpatient psychiatric care, in addition questionable benefit given non-verbal nature, ability to participate in therapy as well as need for physical therapy for recent femur  fracture. For now continue current medications as prescribed and SNF placement.       Treatment Plan:     Planned Medication Changes:    All current active medications have been reviewed  No indication for inpatient psychiatric care at present - continue to recommend STR placement per primary care team to address ongoing medical issues  Continue current medications - on decent doses of both Seroquel and Depakote, for now mood has been stable  Previously d/c'ed off cogentin for EPS and atarax due to anticholinergic side effects and concern for worsening agitation. There are some orofacial movements present, however not bothersome to patient.   Labs reviewed - can recheck VPA level for monitoring  ECG shows prolonged QTc of 482 ms; recommend repeat for monitoring  Case discussed with primary care team  Thank you for allowing psychiatry to participate in consult. Please reach out via TT for any other questions or concerns  Current Facility-Administered Medications   Medication Dose Route Frequency Provider Last Rate    acetaminophen  650 mg Oral Q6H PRN Hasmukh Becker PA-C      divalproex sodium  1,000 mg Oral HS Hasmukh Becker PA-C      divalproex sodium  750 mg Oral Daily Hasmukh Becker PA-C      docusate sodium  100 mg Oral BID PRN Hasmukh Becker PA-C      enoxaparin  40 mg Subcutaneous Daily Hasmukh Becker PA-C      gabapentin  400 mg Oral TID Hasmukh Becker PA-C      ibuprofen  600 mg Oral Q6H PRN Ancelmo Mota PA-C      insulin lispro  1-5 Units Subcutaneous TID AC Hasmukh Becker PA-C      insulin lispro  1-6 Units Subcutaneous HS Hasmukh Becker PA-C      lacosamide  150 mg Oral Daily Hasmukh Becker PA-C      lacosamide  200 mg Oral HS Hasmukh Becker PA-C      levETIRAcetam  1,500 mg Oral Q12H [facility] Hasmukh Becker PA-C      levothyroxine  150 mcg Oral Daily Hasmukh Becker PA-C      lisinopril  10 mg Oral Daily Hasmukh Becker PA-C      loratadine  10 mg Oral Daily Hasmukh Becker PA-C      multivitamin stress formula  1  tablet Oral Daily Hasmukh eBcker PA-C      OLANZapine  5 mg Intramuscular Q3H PRN Hasmukh Becker PA-C      pravastatin  80 mg Oral Daily With Dinner Hasmukh Becker PA-C      QUEtiapine  200 mg Oral BID Hasmukh Becker PA-C      QUEtiapine  400 mg Oral HS Hasmukh Becker PA-C      temazepam  15 mg Oral HS PRN Hasmukh Becker PA-C      zonisamide  300 mg Oral HS Hasmukh Becker PA-C         Risks / Benefits of Treatment:    Risks, benefits, and possible side effects of medications explained to patient and patient verbalizes understanding and agreement for treatment.    Counseling / Coordination of Care:    Total floor / unit time spent today 60 minutes. Greater than 50% of total time was spent with the patient and / or family counseling and / or coordination of care. A description of the counseling / coordination of care:   Patient's presentation on admission and proposed treatment plan discussed with treatment team.  Diagnosis, medication changes and treatment plan reviewed with patient.    Renee Maza PA-C 03/05/24

## 2024-03-05 NOTE — ASSESSMENT & PLAN NOTE
Lab Results   Component Value Date    HGBA1C 5.5 11/27/2023       Recent Labs     03/04/24 2057 03/05/24  0701 03/05/24  1121 03/05/24  1610   POCGLU 136 102 118 118         Blood Sugar Average: Last 72 hrs:  (P) 130    Continue diabetic diet   Continue fingerstick blood sugar with sliding scale coverage  Hypoglycemia protocol

## 2024-03-05 NOTE — PLAN OF CARE
Problem: PAIN - ADULT  Goal: Verbalizes/displays adequate comfort level or baseline comfort level  Description: Interventions:  - Encourage patient to monitor pain and request assistance  - Assess pain using appropriate pain scale  - Administer analgesics based on type and severity of pain and evaluate response  - Implement non-pharmacological measures as appropriate and evaluate response  - Consider cultural and social influences on pain and pain management  - Notify physician/advanced practitioner if interventions unsuccessful or patient reports new pain  Outcome: Progressing     Problem: INFECTION - ADULT  Goal: Absence or prevention of progression during hospitalization  Description: INTERVENTIONS:  - Assess and monitor for signs and symptoms of infection  - Monitor lab/diagnostic results  - Monitor all insertion sites, i.e. indwelling lines, tubes, and drains  - Monitor endotracheal if appropriate and nasal secretions for changes in amount and color  - [place] appropriate cooling/warming therapies per order  - Administer medications as ordered  - Instruct and encourage patient and family to use good hand hygiene technique  - Identify and instruct in appropriate isolation precautions for identified infection/condition  Outcome: Progressing  Goal: Absence of fever/infection during neutropenic period  Description: INTERVENTIONS:  - Monitor WBC    Outcome: Progressing     Problem: SAFETY ADULT  Goal: Patient will remain free of falls  Description: INTERVENTIONS:  - Educate patient/family on patient safety including physical limitations  - Instruct patient to call for assistance with activity   - Consult OT/PT to assist with strengthening/mobility   - Keep Call bell within reach  - Keep bed low and locked with side rails adjusted as appropriate  - Keep care items and personal belongings within reach  - Initiate and maintain comfort rounds  - Make Fall Risk Sign visible to staff  - Offer Toileting every  Hours,  in advance of need  - Initiate/Maintain alarm  - Obtain necessary fall risk management equipment:   - Apply yellow socks and bracelet for high fall risk patients  - Consider moving patient to room near nurses station  Outcome: Progressing  Goal: Maintain or return to baseline ADL function  Description: INTERVENTIONS:  -  Assess patient's ability to carry out ADLs; assess patient's baseline for ADL function and identify physical deficits which impact ability to perform ADLs (bathing, care of mouth/teeth, toileting, grooming, dressing, etc.)  - Assess/evaluate cause of self-care deficits   - Assess range of motion  - Assess patient's mobility; develop plan if impaired  - Assess patient's need for assistive devices and provide as appropriate  - Encourage maximum independence but intervene and supervise when necessary  - Involve family in performance of ADLs  - Assess for home care needs following discharge   - Consider OT consult to assist with ADL evaluation and planning for discharge  - Provide patient education as appropriate  Outcome: Progressing  Goal: Maintains/Returns to pre admission functional level  Description: INTERVENTIONS:  - Perform AM-PAC 6 Click Basic Mobility/ Daily Activity assessment daily.  - Set and communicate daily mobility goal to care team and patient/family/caregiver.   - Collaborate with rehabilitation services on mobility goals if consulted  - Perform Range of Motion  times a day.  - Reposition patient every  hours.  - Dangle patient  times a day  - Stand patient  times a day  - Ambulate patient  times a day  - Out of bed to chair  times a day   - Out of bed for meals times a day  - Out of bed for toileting  - Record patient progress and toleration of activity level   Outcome: Progressing     Problem: DISCHARGE PLANNING  Goal: Discharge to home or other facility with appropriate resources  Description: INTERVENTIONS:  - Identify barriers to discharge w/patient and caregiver  - Arrange for  needed discharge resources and transportation as appropriate  - Identify discharge learning needs (meds, wound care, etc.)  - Arrange for interpretive services to assist at discharge as needed  - Refer to Case Management Department for coordinating discharge planning if the patient needs post-hospital services based on physician/advanced practitioner order or complex needs related to functional status, cognitive ability, or social support system  Outcome: Progressing     Problem: Knowledge Deficit  Goal: Patient/family/caregiver demonstrates understanding of disease process, treatment plan, medications, and discharge instructions  Description: Complete learning assessment and assess knowledge base.  Interventions:  - Provide teaching at level of understanding  - Provide teaching via preferred learning methods  Outcome: Progressing     Problem: Prexisting or High Potential for Compromised Skin Integrity  Goal: Skin integrity is maintained or improved  Description: INTERVENTIONS:  - Identify patients at risk for skin breakdown  - Assess and monitor skin integrity  - Assess and monitor nutrition and hydration status  - Monitor labs   - Assess for incontinence   - Turn and reposition patient  - Assist with mobility/ambulation  - Relieve pressure over bony prominences  - Avoid friction and shearing  - Provide appropriate hygiene as needed including keeping skin clean and dry  - Evaluate need for skin moisturizer/barrier cream  - Collaborate with interdisciplinary team   - Patient/family teaching  - Consider wound care consult   Outcome: Progressing

## 2024-03-05 NOTE — ASSESSMENT & PLAN NOTE
Patient has nonoperative right hip greater trochanter fracture was seen and evaluated by orthopedic surgery on the previous admission  He was discharged to a skilled nursing facility agitated, broke a chair, and was reported to become aggressive with staff.  The facility will now not take him back  Seen and evaluated by PT OT medically cleared for discharge to skilled nursing facility  Psychiatry recommendations appreciated.  No indication for inpatient psychiatric hospitalization at this time.  Recommendation is rehab/SNF  Case management following, input appreciated

## 2024-03-05 NOTE — PROGRESS NOTES
Formerly [facility]  Progress Note  Name: Jairon Oconnell I  MRN: [id number]  Unit/Bed#: -01 I Date of Admission: 3/1/2024   Date of Service: 3/5/2024 I Hospital Day: 4    Assessment/Plan     * Ambulatory dysfunction  Assessment & Plan  Patient has nonoperative right hip greater trochanter fracture was seen and evaluated by orthopedic surgery on the previous admission  He was discharged to a skilled nursing facility agitated, broke a chair, and was reported to become aggressive with staff.  The facility will now not take him back  Seen and evaluated by PT OT medically cleared for discharge to skilled nursing facility  Psychiatry recommendations appreciated.  No indication for inpatient psychiatric hospitalization at this time.  Recommendation is rehab/SNF  Case management following, input appreciated      Generalized convulsive epilepsy (HCC)  Assessment & Plan  Continue Depakote DR 1000 mg p.o. nightly and 750 mg p.o. daily, Neurontin 400 mg p.o. 3 times daily, Vimpat 150 mg p.o. daily and 200 mg p.o. nightly and Keppra 1500 mg p.o. every 12 hours  Continue seizure precautions    Type 2 diabetes mellitus with diabetic neuropathy, without long-term current use of insulin ([facility])  Assessment & Plan  Lab Results   Component Value Date    HGBA1C 5.5 11/27/2023       Recent Labs     03/04/24  2057 03/05/24  0701 03/05/24  1121 03/05/24  1610   POCGLU 136 102 118 118         Blood Sugar Average: Last 72 hrs:  (P) 130    Continue diabetic diet   Continue fingerstick blood sugar with sliding scale coverage  Hypoglycemia protocol    Pressure ulcer of sacral region, stage 3 (HCC)  Assessment & Plan  Wound care recommendations appreciated: Scarring to the left lateral buttock from stage 3 pressure injury in the past. Scarring is intact and blanchable and hyperpigmented   Turn and reposition every 2 hours, frequent offloading    Essential hypertension  Assessment & Plan  Continue lisinopril 10 mg p.o.  daily  Monitor blood pressure    Hyperlipidemia  Assessment & Plan  Continue Pravachol 80 mg oral daily at bedtime         VTE Pharmacologic Prophylaxis:   Moderate Risk (Score 3-4) - Pharmacological DVT Prophylaxis Ordered: enoxaparin (Lovenox).    Mobility:   Basic Mobility Inpatient Raw Score: 15  JH-HLM Goal: 4: Move to chair/commode  JH-HLM Achieved: 4: Move to chair/commode  HLM Goal achieved. Continue to encourage appropriate mobility.    Patient Centered Rounds: I performed bedside rounds with nursing staff today.   Discussions with Specialists or Other Care Team Provider: GORDON    Education and Discussions with Family / Patient: Updated  (caregiver) via phone.    Total Time Spent on Date of Encounter in care of patient: 45 mins. This time was spent on one or more of the following: performing physical exam; counseling and coordination of care; obtaining or reviewing history; documenting in the medical record; reviewing/ordering tests, medications or procedures; communicating with other healthcare professionals and discussing with patient's family/caregivers.    Current Length of Stay: 4 day(s)  Current Patient Status: Inpatient   Certification Statement: The patient will continue to require additional inpatient hospital stay due to care coordination.   Discharge Plan: Anticipate discharge in 48 hrs to rehab facility.    Code Status: Level 1 - Full Code    Subjective:   No complaints offered.  Unable to obtain full review of systems.     Objective:     Vitals:   Temp (24hrs), Av °F (36.1 °C), Min:96.6 °F (35.9 °C), Max:97.4 °F (36.3 °C)    Temp:  [96.6 °F (35.9 °C)-97.4 °F (36.3 °C)] 96.6 °F (35.9 °C)  HR:  [76-80] 80  Resp:  [18-19] 19  BP: (118-132)/(67-72) 132/70  SpO2:  [94 %-95 %] 95 %  Body mass index is 19.88 kg/m².     Input and Output Summary (last 24 hours):     Intake/Output Summary (Last 24 hours) at 3/5/2024 1711  Last data filed at 3/5/2024 1651  Gross per 24 hour   Intake 1200 ml    Output 1400 ml   Net -200 ml       Physical Exam:   Physical Exam  Vitals and nursing note reviewed.   Constitutional:       General: He is not in acute distress.     Appearance: He is not ill-appearing.   HENT:      Head: Normocephalic and atraumatic.      Nose: Nose normal.      Mouth/Throat:      Mouth: Mucous membranes are moist.      Pharynx: Oropharynx is clear.   Eyes:      Pupils: Pupils are equal, round, and reactive to light.   Cardiovascular:      Rate and Rhythm: Normal rate and regular rhythm.   Pulmonary:      Effort: Pulmonary effort is normal. No respiratory distress.      Breath sounds: Normal breath sounds.   Abdominal:      General: Bowel sounds are normal.      Palpations: Abdomen is soft.      Tenderness: There is no abdominal tenderness.   Musculoskeletal:      Cervical back: Neck supple.      Right lower leg: No edema.      Left lower leg: No edema.   Skin:     General: Skin is warm and dry.      Capillary Refill: Capillary refill takes less than 2 seconds.   Neurological:      General: No focal deficit present.      Mental Status: He is alert. Mental status is at baseline.   Psychiatric:      Comments: Cooperative.  Minimally verbal but does interact.         Additional Data:     Labs:  Results from last 7 days   Lab Units 02/29/24  0617 02/28/24  0603 02/27/24 2031   WBC Thousand/uL 6.96   < > 7.95   HEMOGLOBIN g/dL 12.3   < > 12.6   HEMATOCRIT % 36.3*   < > 36.6   PLATELETS Thousands/uL 173   < > 184   NEUTROS PCT %  --   --  46   LYMPHS PCT %  --   --  35   MONOS PCT %  --   --  12   EOS PCT %  --   --  5    < > = values in this interval not displayed.     Results from last 7 days   Lab Units 02/29/24  0617 02/28/24  0603 02/27/24 2031   SODIUM mmol/L 138   < > 139   POTASSIUM mmol/L 4.1   < > 4.2   CHLORIDE mmol/L 103   < > 104   CO2 mmol/L 29   < > 28   BUN mg/dL 24   < > 24   CREATININE mg/dL 0.81   < > 0.61   ANION GAP mmol/L 6   < > 7   CALCIUM mg/dL 9.1   < > 9.3   ALBUMIN g/dL   --   --  4.1   TOTAL BILIRUBIN mg/dL  --   --  0.22   ALK PHOS U/L  --   --  66   ALT U/L  --   --  17   AST U/L  --   --  17   GLUCOSE RANDOM mg/dL 131   < > 83    < > = values in this interval not displayed.         Results from last 7 days   Lab Units 03/05/24  1610 03/05/24  1121 03/05/24  0701 03/04/24  2057 03/04/24  1600 03/04/24  1113 03/04/24  0707 03/03/24  2101 03/03/24  1558 03/03/24  1053 03/03/24  0647 03/02/24 2048   POC GLUCOSE mg/dl 118 118 102 136 137 144* 119 135 141* 138 90 179*               Lines/Drains:  Invasive Devices       Drain  Duration             Urethral Catheter Double-lumen 16 Fr. 5 days                  Urinary Catheter:  Goal for removal: Voiding trial when ambulation improves             Last 24 Hours Medication List:   Current Facility-Administered Medications   Medication Dose Route Frequency Provider Last Rate    acetaminophen  650 mg Oral Q6H PRN Hasmukh Becker PA-C      divalproex sodium  1,000 mg Oral HS Hasmukh Becker PA-C      divalproex sodium  750 mg Oral Daily Hasmukh Becker PA-C      docusate sodium  100 mg Oral BID PRN Hasmukh Becker PA-C      enoxaparin  40 mg Subcutaneous Daily Hasmukh Becker PA-C      gabapentin  400 mg Oral TID Hsamukh Becker PA-C      ibuprofen  600 mg Oral Q6H PRN Ancelmo Mota PA-C      insulin lispro  1-5 Units Subcutaneous TID AC Hasmukh Becker PA-C      insulin lispro  1-6 Units Subcutaneous HS Hasmukh Becker PA-C      lacosamide  150 mg Oral Daily Hasmukh Becker PA-C      lacosamide  200 mg Oral HS Hasmukh Becker PA-C      levETIRAcetam  1,500 mg Oral Q12H [facility] Hasmukh Becker PA-C      levothyroxine  150 mcg Oral Daily Hasmukh Becker PA-C      lisinopril  10 mg Oral Daily Hasmukh Becker PA-C      loratadine  10 mg Oral Daily Hasmukh Becker PA-C      multivitamin stress formula  1 tablet Oral Daily Hasmukh Becker, PA-C      OLANZapine  5 mg Intramuscular Q3H PRN Hasmukh Becker PA-C      pravastatin  80 mg Oral Daily With Dinner Hasmukh Becker PA-C       QUEtiapine  200 mg Oral BID Hasmukh Becker PA-C      QUEtiapine  400 mg Oral HS Hasmukh Becker PA-C      temazepam  15 mg Oral HS PRN Hasmukh Becker PA-C      zonisamide  300 mg Oral HS Hasmukh Becker PA-C          Today, Patient Was Seen By: HUNTER Main    **Please Note: This note may have been constructed using a voice recognition system.**

## 2024-03-06 PROBLEM — R33.9 URINARY RETENTION: Status: ACTIVE | Noted: 2024-03-06

## 2024-03-06 LAB
ATRIAL RATE: 85 BPM
GLUCOSE SERPL-MCNC: 118 MG/DL (ref 65–140)
GLUCOSE SERPL-MCNC: 129 MG/DL (ref 65–140)
GLUCOSE SERPL-MCNC: 136 MG/DL (ref 65–140)
GLUCOSE SERPL-MCNC: 150 MG/DL (ref 65–140)
P AXIS: 46 DEGREES
PR INTERVAL: 176 MS
QRS AXIS: -16 DEGREES
QRSD INTERVAL: 160 MS
QT INTERVAL: 420 MS
QTC INTERVAL: 499 MS
T WAVE AXIS: 14 DEGREES
VALPROATE SERPL-MCNC: 100 UG/ML (ref 50–100)
VENTRICULAR RATE: 85 BPM

## 2024-03-06 PROCEDURE — 82948 REAGENT STRIP/BLOOD GLUCOSE: CPT

## 2024-03-06 PROCEDURE — 80164 ASSAY DIPROPYLACETIC ACD TOT: CPT | Performed by: NURSE PRACTITIONER

## 2024-03-06 PROCEDURE — 93010 ELECTROCARDIOGRAM REPORT: CPT | Performed by: INTERNAL MEDICINE

## 2024-03-06 PROCEDURE — 80165 DIPROPYLACETIC ACID FREE: CPT | Performed by: NURSE PRACTITIONER

## 2024-03-06 PROCEDURE — 99232 SBSQ HOSP IP/OBS MODERATE 35: CPT | Performed by: NURSE PRACTITIONER

## 2024-03-06 RX ORDER — TAMSULOSIN HYDROCHLORIDE 0.4 MG/1
0.4 CAPSULE ORAL
Status: DISCONTINUED | OUTPATIENT
Start: 2024-03-06 | End: 2024-04-01 | Stop reason: HOSPADM

## 2024-03-06 RX ADMIN — ENOXAPARIN SODIUM 40 MG: 40 INJECTION SUBCUTANEOUS at 09:37

## 2024-03-06 RX ADMIN — IBUPROFEN 600 MG: 600 TABLET, FILM COATED ORAL at 21:10

## 2024-03-06 RX ADMIN — GABAPENTIN 400 MG: 400 CAPSULE ORAL at 21:10

## 2024-03-06 RX ADMIN — LACOSAMIDE 200 MG: 150 TABLET ORAL at 21:10

## 2024-03-06 RX ADMIN — LEVOTHYROXINE SODIUM 150 MCG: 75 TABLET ORAL at 09:38

## 2024-03-06 RX ADMIN — LEVETIRACETAM 1500 MG: 500 TABLET, FILM COATED ORAL at 09:37

## 2024-03-06 RX ADMIN — PRAVASTATIN SODIUM 80 MG: 40 TABLET ORAL at 17:07

## 2024-03-06 RX ADMIN — QUETIAPINE 400 MG: 400 TABLET, FILM COATED, EXTENDED RELEASE ORAL at 21:10

## 2024-03-06 RX ADMIN — INSULIN LISPRO 1 UNITS: 100 INJECTION, SOLUTION INTRAVENOUS; SUBCUTANEOUS at 17:07

## 2024-03-06 RX ADMIN — B-COMPLEX W/ C & FOLIC ACID TAB 1 TABLET: TAB at 09:38

## 2024-03-06 RX ADMIN — DIVALPROEX SODIUM 750 MG: 250 TABLET, DELAYED RELEASE ORAL at 09:37

## 2024-03-06 RX ADMIN — LACOSAMIDE 150 MG: 150 TABLET ORAL at 09:40

## 2024-03-06 RX ADMIN — QUETIAPINE FUMARATE 200 MG: 150 TABLET, EXTENDED RELEASE ORAL at 09:38

## 2024-03-06 RX ADMIN — GABAPENTIN 400 MG: 400 CAPSULE ORAL at 09:38

## 2024-03-06 RX ADMIN — DIVALPROEX SODIUM 1000 MG: 500 TABLET, DELAYED RELEASE ORAL at 21:10

## 2024-03-06 RX ADMIN — LEVETIRACETAM 1500 MG: 500 TABLET, FILM COATED ORAL at 21:10

## 2024-03-06 RX ADMIN — ZONISAMIDE 300 MG: 100 CAPSULE ORAL at 21:10

## 2024-03-06 RX ADMIN — LISINOPRIL 10 MG: 10 TABLET ORAL at 09:39

## 2024-03-06 RX ADMIN — LORATADINE 10 MG: 10 TABLET ORAL at 09:38

## 2024-03-06 RX ADMIN — TAMSULOSIN HYDROCHLORIDE 0.4 MG: 0.4 CAPSULE ORAL at 17:07

## 2024-03-06 RX ADMIN — GABAPENTIN 400 MG: 400 CAPSULE ORAL at 17:07

## 2024-03-06 RX ADMIN — QUETIAPINE FUMARATE 200 MG: 150 TABLET, EXTENDED RELEASE ORAL at 14:13

## 2024-03-06 NOTE — ASSESSMENT & PLAN NOTE
Has a chapman catheter   Continue with urinary retention   Voiding trial as he is more ambulatory   Start on tamsulosin (3/6)

## 2024-03-06 NOTE — ASSESSMENT & PLAN NOTE
Patient has nonoperative right hip greater trochanter fracture was seen and evaluated by orthopedic surgery on the previous admission  He was discharged to a skilled nursing facility agitated, broke a chair, and was reported to become aggressive with staff.  The facility will now not take him back.   Seen and evaluated by PT/OT medically cleared for discharge to skilled nursing facility  Psychiatry recommendations appreciated. No indication for inpatient psychiatric hospitalization at this time.  Recommendation is rehab/SNF  Case management following, input appreciated

## 2024-03-06 NOTE — ASSESSMENT & PLAN NOTE
Lab Results   Component Value Date    HGBA1C 5.5 11/27/2023       Recent Labs     03/05/24  1121 03/05/24  1610 03/05/24 2048 03/06/24  0700   POCGLU 118 118 191* 136         Blood Sugar Average: Last 72 hrs:  (P) 134.5487048388561635    Continue diabetic diet    Continue fingerstick blood sugar with sliding scale coverage  Hypoglycemia protocol

## 2024-03-06 NOTE — PLAN OF CARE
Problem: PAIN - ADULT  Goal: Verbalizes/displays adequate comfort level or baseline comfort level  Description: Interventions:  - Encourage patient to monitor pain and request assistance  - Assess pain using appropriate pain scale  - Administer analgesics based on type and severity of pain and evaluate response  - Implement non-pharmacological measures as appropriate and evaluate response  - Consider cultural and social influences on pain and pain management  - Notify physician/advanced practitioner if interventions unsuccessful or patient reports new pain  Outcome: Progressing     Problem: INFECTION - ADULT  Goal: Absence or prevention of progression during hospitalization  Description: INTERVENTIONS:  - Assess and monitor for signs and symptoms of infection  - Monitor lab/diagnostic results  - Monitor all insertion sites, i.e. indwelling lines, tubes, and drains  - Monitor endotracheal if appropriate and nasal secretions for changes in amount and color  - [place] appropriate cooling/warming therapies per order  - Administer medications as ordered  - Instruct and encourage patient and family to use good hand hygiene technique  - Identify and instruct in appropriate isolation precautions for identified infection/condition  Outcome: Progressing  Goal: Absence of fever/infection during neutropenic period  Description: INTERVENTIONS:  - Monitor WBC    Outcome: Progressing     Problem: SAFETY ADULT  Goal: Patient will remain free of falls  Description: INTERVENTIONS:  - Educate patient/family on patient safety including physical limitations  - Instruct patient to call for assistance with activity   - Consult OT/PT to assist with strengthening/mobility   - Keep Call bell within reach  - Keep bed low and locked with side rails adjusted as appropriate  - Keep care items and personal belongings within reach  - Initiate and maintain comfort rounds  - Make Fall Risk Sign visible to staff  - Offer Toileting every 1 Hours,  in advance of need  - Initiate/Maintain bed alarm  - Obtain necessary fall risk management equipment: bed   - Apply yellow socks and bracelet for high fall risk patients  - Consider moving patient to room near nurses station  Outcome: Progressing  Goal: Maintain or return to baseline ADL function  Description: INTERVENTIONS:  -  Assess patient's ability to carry out ADLs; assess patient's baseline for ADL function and identify physical deficits which impact ability to perform ADLs (bathing, care of mouth/teeth, toileting, grooming, dressing, etc.)  - Assess/evaluate cause of self-care deficits   - Assess range of motion  - Assess patient's mobility; develop plan if impaired  - Assess patient's need for assistive devices and provide as appropriate  - Encourage maximum independence but intervene and supervise when necessary  - Involve family in performance of ADLs  - Assess for home care needs following discharge   - Consider OT consult to assist with ADL evaluation and planning for discharge  - Provide patient education as appropriate  Outcome: Progressing  Goal: Maintains/Returns to pre admission functional level  Description: INTERVENTIONS:  - Perform AM-PAC 6 Click Basic Mobility/ Daily Activity assessment daily.  - Set and communicate daily mobility goal to care team and patient/family/caregiver.   - Collaborate with rehabilitation services on mobility goals if consulted  - Perform Range of Motion 3 times a day.  - Reposition patient every 2 hours.  - Dangle patient 3 times a day  - Stand patient 3 times a day  - Ambulate patient 3 times a day  - Out of bed to chair 3 times a day   - Out of bed for meals 3 times a day  - Out of bed for toileting  - Record patient progress and toleration of activity level   Outcome: Progressing     Problem: Prexisting or High Potential for Compromised Skin Integrity  Goal: Skin integrity is maintained or improved  Description: INTERVENTIONS:  - Identify patients at risk for skin  breakdown  - Assess and monitor skin integrity  - Assess and monitor nutrition and hydration status  - Monitor labs   - Assess for incontinence   - Turn and reposition patient  - Assist with mobility/ambulation  - Relieve pressure over bony prominences  - Avoid friction and shearing  - Provide appropriate hygiene as needed including keeping skin clean and dry  - Evaluate need for skin moisturizer/barrier cream  - Collaborate with interdisciplinary team   - Patient/family teaching  - Consider wound care consult   Outcome: Progressing

## 2024-03-06 NOTE — PROGRESS NOTES
[facility]  Progress Note  Name: Jairon Oconnell I  MRN: [id number]  Unit/Bed#: -01 I Date of Admission: 3/1/2024   Date of Service: 3/6/2024 I Hospital Day: 5    Assessment/Plan   * Ambulatory dysfunction  Assessment & Plan  Patient has nonoperative right hip greater trochanter fracture was seen and evaluated by orthopedic surgery on the previous admission  He was discharged to a skilled nursing facility agitated, broke a chair, and was reported to become aggressive with staff.  The facility will now not take him back.   Seen and evaluated by PT/OT medically cleared for discharge to skilled nursing facility  Psychiatry recommendations appreciated. No indication for inpatient psychiatric hospitalization at this time.  Recommendation is rehab/SNF  Case management following, input appreciated      Urinary retention  Assessment & Plan  Has a chapman catheter   Continue with urinary retention   Voiding trial as he is more ambulatory   Start on tamsulosin (3/6)     Pressure ulcer of sacral region, stage 3 (HCC)  Assessment & Plan  Wound care recommendations appreciated: Scarring to the left lateral buttock from stage 3 pressure injury in the past. Scarring is intact and blanchable and hyperpigmented   Turn and reposition every 2 hours, frequent offloading     Acquired hypothyroidism  Assessment & Plan  Continue levothyroxine 150 mcg p.o. every morning     Essential hypertension  Assessment & Plan  Continue lisinopril 10 mg p.o. daily  Monitor blood pressure      Mixed hyperlipidemia  Assessment & Plan  Continue Pravachol 80 mg oral daily at bedtime     Generalized convulsive epilepsy (HCC)  Assessment & Plan  Continue Depakote DR 1000 mg p.o. nightly and 750 mg p.o. daily, Neurontin 400 mg p.o. 3 times daily, Vimpat 150 mg p.o. daily and 200 mg p.o. nightly and Keppra 1500 mg p.o. every 12 hours  Continue seizure precautions     Type 2 diabetes mellitus with diabetic neuropathy, without  long-term current use of insulin (HCC)  Assessment & Plan  Lab Results   Component Value Date    HGBA1C 5.5 11/27/2023       Recent Labs     03/05/24  1121 03/05/24  1610 03/05/24  2048 03/06/24  0700   POCGLU 118 118 191* 136         Blood Sugar Average: Last 72 hrs:  (P) 134.7918783219701934    Continue diabetic diet    Continue fingerstick blood sugar with sliding scale coverage  Hypoglycemia protocol               VTE Pharmacologic Prophylaxis: VTE Score: 3 Moderate Risk (Score 3-4) - Pharmacological DVT Prophylaxis Ordered: enoxaparin (Lovenox).    Mobility:   Basic Mobility Inpatient Raw Score: 15  JH-HLM Goal: 4: Move to chair/commode  JH-HLM Achieved: 3: Sit at edge of bed  HLM Goal NOT achieved. Continue with multidisciplinary rounding and encourage appropriate mobility to improve upon HLM goals.    Patient Centered Rounds: I performed bedside rounds with nursing staff today.   Discussions with Specialists or Other Care Team Provider: CM, OT/PT     Education and Discussions with Family / Patient:  patient was updated.     Total Time Spent on Date of Encounter in care of patient: 27 mins. This time was spent on one or more of the following: performing physical exam; counseling and coordination of care; obtaining or reviewing history; documenting in the medical record; reviewing/ordering tests, medications or procedures; communicating with other healthcare professionals and discussing with patient's family/caregivers.    Current Length of Stay: 5 day(s)  Current Patient Status: Inpatient   Certification Statement: The patient will continue to require additional inpatient hospital stay due to ambulatory gait dysfunction  Discharge Plan: Anticipate discharge in 24-48 hrs to rehab facility.    Code Status: Level 1 - Full Code    Subjective:   The patient was seen and examined.  He offers no complaint at this time.  Per staff, he has been cooperative with care.  Patient has a Carrillo catheter.    Objective:      Vitals:   Temp (24hrs), Av °F (36.1 °C), Min:96.6 °F (35.9 °C), Max:97.4 °F (36.3 °C)    Temp:  [96.6 °F (35.9 °C)-97.4 °F (36.3 °C)] 96.9 °F (36.1 °C)  HR:  [70-85] 70  Resp:  [18-19] 19  BP: (120-132)/(70-73) 120/73  SpO2:  [96 %-97 %] 96 %  Body mass index is 19.88 kg/m².     Input and Output Summary (last 24 hours):     Intake/Output Summary (Last 24 hours) at 3/6/2024 1108  Last data filed at 3/6/2024 0900  Gross per 24 hour   Intake 840 ml   Output 1550 ml   Net -710 ml       Physical Exam:   Physical Exam  Vitals and nursing note reviewed.   Constitutional:       General: He is not in acute distress.     Appearance: Normal appearance.      Comments: Frail and appearing older than age   HENT:      Head: Normocephalic and atraumatic.      Right Ear: External ear normal.      Left Ear: External ear normal.      Nose: Nose normal.      Mouth/Throat:      Mouth: Mucous membranes are moist.      Pharynx: Oropharynx is clear.   Eyes:      General:         Right eye: No discharge.         Left eye: No discharge.      Extraocular Movements: Extraocular movements intact.      Pupils: Pupils are equal, round, and reactive to light.   Cardiovascular:      Rate and Rhythm: Normal rate and regular rhythm.      Pulses: Normal pulses.      Heart sounds: Normal heart sounds. No murmur heard.  Pulmonary:      Effort: Pulmonary effort is normal. No respiratory distress.      Breath sounds: Normal breath sounds. No wheezing or rales.   Abdominal:      General: Bowel sounds are normal. There is no distension.      Palpations: Abdomen is soft. There is no mass.      Tenderness: There is no abdominal tenderness.   Genitourinary:     Comments: Carrillo catheter in place. Clear yellow urine.   Musculoskeletal:         General: No swelling, tenderness or deformity. Normal range of motion.      Cervical back: Normal range of motion and neck supple. No rigidity.   Skin:     General: Skin is warm and dry.      Capillary Refill:  Capillary refill takes less than 2 seconds.      Coloration: Skin is not pale.      Findings: No erythema.   Neurological:      General: No focal deficit present.      Mental Status: He is alert. Mental status is at baseline.      Comments: With periods of forgetfulness/confusion    Psychiatric:         Attention and Perception: Attention normal.         Mood and Affect: Affect is flat.         Cognition and Memory: Cognition is impaired.       Additional Data:     Labs:  Results from last 7 days   Lab Units 02/29/24  0617   WBC Thousand/uL 6.96   HEMOGLOBIN g/dL 12.3   HEMATOCRIT % 36.3*   PLATELETS Thousands/uL 173     Results from last 7 days   Lab Units 02/29/24  0617   SODIUM mmol/L 138   POTASSIUM mmol/L 4.1   CHLORIDE mmol/L 103   CO2 mmol/L 29   BUN mg/dL 24   CREATININE mg/dL 0.81   ANION GAP mmol/L 6   CALCIUM mg/dL 9.1   GLUCOSE RANDOM mg/dL 131         Results from last 7 days   Lab Units 03/06/24  0700 03/05/24  2048 03/05/24  1610 03/05/24  1121 03/05/24  0701 03/04/24  2057 03/04/24  1600 03/04/24  1113 03/04/24  0707 03/03/24  2101 03/03/24  1558 03/03/24  1053   POC GLUCOSE mg/dl 136 191* 118 118 102 136 137 144* 119 135 141* 138               Lines/Drains:  Invasive Devices       Drain  Duration             Urethral Catheter Double-lumen 16 Fr. 6 days                  Urinary Catheter:  Goal for removal: Voiding trial when ambulation improves               Imaging: Reviewed radiology reports from this admission including: CT head and xray(s)    Recent Cultures (last 7 days):         Last 24 Hours Medication List:   Current Facility-Administered Medications   Medication Dose Route Frequency Provider Last Rate    acetaminophen  650 mg Oral Q6H PRN Hasmukh Becker PA-C      divalproex sodium  1,000 mg Oral HS Hasmukh Becker PA-C      divalproex sodium  750 mg Oral Daily Hasmukh Becker PA-C      docusate sodium  100 mg Oral BID PRN Hasmukh Becker PA-C      enoxaparin  40 mg Subcutaneous Daily Hasmukh Becker  PA-C      gabapentin  400 mg Oral TID Hasmukh Becker, PA-DENIA      ibuprofen  600 mg Oral Q6H PRN Ancelmo Mota, PA-DENIA      insulin lispro  1-5 Units Subcutaneous TID AC Hasmukh Becker, PA-DENIA      insulin lispro  1-6 Units Subcutaneous HS Hasmukh Becker, PA-DENIA      lacosamide  150 mg Oral Daily Hasmukh Becker, PA-DENIA      lacosamide  200 mg Oral HS Hasmukh Becker, PA-DENIA      levETIRAcetam  1,500 mg Oral Q12H RONALD Hasmukh Becker, PA-DENIA      levothyroxine  150 mcg Oral Daily Hasmukh Becker, JAIR      lisinopril  10 mg Oral Daily Hasmukh Becker, JAIR      loratadine  10 mg Oral Daily Hasmukh Becker, PA-DENIA      multivitamin stress formula  1 tablet Oral Daily Hasmukh Becker, JAIR      OLANZapine  5 mg Intramuscular Q3H PRN Hasmukh Becker, JAIR      pravastatin  80 mg Oral Daily With Dinner Hasmukh Becker, JAIR      QUEtiapine  200 mg Oral BID Hasmukh Becker, JAIR      QUEtiapine  400 mg Oral HS Hasmukh Becker, JAIR      tamsulosin  0.4 mg Oral Daily With Dinner HUNTER Lopez      temazepam  15 mg Oral HS PRN Hasmukh Becker, JAIR      zonisamide  300 mg Oral HS Hasmukh Becker, JAIR          Today, Patient Was Seen By: HUNTER Lopez    **Please Note: This note may have been constructed using a voice recognition system.**

## 2024-03-07 LAB
GLUCOSE SERPL-MCNC: 122 MG/DL (ref 65–140)
GLUCOSE SERPL-MCNC: 127 MG/DL (ref 65–140)
GLUCOSE SERPL-MCNC: 133 MG/DL (ref 65–140)
GLUCOSE SERPL-MCNC: 157 MG/DL (ref 65–140)

## 2024-03-07 PROCEDURE — 99232 SBSQ HOSP IP/OBS MODERATE 35: CPT | Performed by: NURSE PRACTITIONER

## 2024-03-07 PROCEDURE — 82948 REAGENT STRIP/BLOOD GLUCOSE: CPT

## 2024-03-07 RX ADMIN — INSULIN LISPRO 1 UNITS: 100 INJECTION, SOLUTION INTRAVENOUS; SUBCUTANEOUS at 12:00

## 2024-03-07 RX ADMIN — DIVALPROEX SODIUM 1000 MG: 500 TABLET, DELAYED RELEASE ORAL at 21:17

## 2024-03-07 RX ADMIN — PRAVASTATIN SODIUM 80 MG: 40 TABLET ORAL at 18:13

## 2024-03-07 RX ADMIN — QUETIAPINE FUMARATE 200 MG: 150 TABLET, EXTENDED RELEASE ORAL at 07:59

## 2024-03-07 RX ADMIN — LEVETIRACETAM 1500 MG: 500 TABLET, FILM COATED ORAL at 09:33

## 2024-03-07 RX ADMIN — GABAPENTIN 400 MG: 400 CAPSULE ORAL at 09:33

## 2024-03-07 RX ADMIN — LORATADINE 10 MG: 10 TABLET ORAL at 09:33

## 2024-03-07 RX ADMIN — DIVALPROEX SODIUM 750 MG: 250 TABLET, DELAYED RELEASE ORAL at 09:33

## 2024-03-07 RX ADMIN — LACOSAMIDE 200 MG: 150 TABLET ORAL at 21:17

## 2024-03-07 RX ADMIN — LEVOTHYROXINE SODIUM 150 MCG: 75 TABLET ORAL at 09:33

## 2024-03-07 RX ADMIN — QUETIAPINE FUMARATE 200 MG: 150 TABLET, EXTENDED RELEASE ORAL at 13:38

## 2024-03-07 RX ADMIN — ZONISAMIDE 300 MG: 100 CAPSULE ORAL at 21:16

## 2024-03-07 RX ADMIN — LEVETIRACETAM 1500 MG: 500 TABLET, FILM COATED ORAL at 21:17

## 2024-03-07 RX ADMIN — B-COMPLEX W/ C & FOLIC ACID TAB 1 TABLET: TAB at 09:33

## 2024-03-07 RX ADMIN — LACOSAMIDE 150 MG: 150 TABLET ORAL at 09:33

## 2024-03-07 RX ADMIN — LISINOPRIL 10 MG: 10 TABLET ORAL at 09:33

## 2024-03-07 RX ADMIN — ENOXAPARIN SODIUM 40 MG: 40 INJECTION SUBCUTANEOUS at 09:34

## 2024-03-07 RX ADMIN — GABAPENTIN 400 MG: 400 CAPSULE ORAL at 18:13

## 2024-03-07 RX ADMIN — TAMSULOSIN HYDROCHLORIDE 0.4 MG: 0.4 CAPSULE ORAL at 18:13

## 2024-03-07 RX ADMIN — QUETIAPINE 400 MG: 400 TABLET, FILM COATED, EXTENDED RELEASE ORAL at 21:17

## 2024-03-07 NOTE — ASSESSMENT & PLAN NOTE
With non-operative right hip greater trochanter fracture seen and evaluated by orthopedic surgery on the previous admission  He was discharged to a skilled nursing facility, became agitated, broke a chair, and was reported to become aggressive with staff.  The facility will now not take him back  Psychiatry recommendations appreciated. No indication for inpatient psychiatric hospitalization at this time.  Recommendation is rehab/SNF  PT OT recommendations appreciated.  Medically clear for discharge to rehab facility   Case management following, input appreciated

## 2024-03-07 NOTE — PLAN OF CARE
Problem: PAIN - ADULT  Goal: Verbalizes/displays adequate comfort level or baseline comfort level  Description: Interventions:  - Encourage patient to monitor pain and request assistance  - Assess pain using appropriate pain scale  - Administer analgesics based on type and severity of pain and evaluate response  - Implement non-pharmacological measures as appropriate and evaluate response  - Consider cultural and social influences on pain and pain management  - Notify physician/advanced practitioner if interventions unsuccessful or patient reports new pain  Outcome: Progressing     Problem: INFECTION - ADULT  Goal: Absence or prevention of progression during hospitalization  Description: INTERVENTIONS:  - Assess and monitor for signs and symptoms of infection  - Monitor lab/diagnostic results  - Monitor all insertion sites, i.e. indwelling lines, tubes, and drains  - Monitor endotracheal if appropriate and nasal secretions for changes in amount and color  - [place] appropriate cooling/warming therapies per order  - Administer medications as ordered  - Instruct and encourage patient and family to use good hand hygiene technique  - Identify and instruct in appropriate isolation precautions for identified infection/condition  Outcome: Progressing     Problem: Prexisting or High Potential for Compromised Skin Integrity  Goal: Skin integrity is maintained or improved  Description: INTERVENTIONS:  - Identify patients at risk for skin breakdown  - Assess and monitor skin integrity  - Assess and monitor nutrition and hydration status  - Monitor labs   - Assess for incontinence   - Turn and reposition patient  - Assist with mobility/ambulation  - Relieve pressure over bony prominences  - Avoid friction and shearing  - Provide appropriate hygiene as needed including keeping skin clean and dry  - Evaluate need for skin moisturizer/barrier cream  - Collaborate with interdisciplinary team   - Patient/family teaching  -  Consider wound care consult   Outcome: Progressing

## 2024-03-07 NOTE — ASSESSMENT & PLAN NOTE
Continue Depakote DR 1000 mg p.o. nightly and 750 mg p.o. daily, Neurontin 400 mg p.o. 3 times daily, Vimpat 150 mg p.o. daily and 200 mg p.o. nightly, zonisamide 300 mg HS, and Keppra 1500 mg p.o. every 12 hours  Continue seizure precautions

## 2024-03-07 NOTE — CASE MANAGEMENT
Case Management Discharge Planning Note    Patient name Jairon Oconnell  Location /-01 MRN [id number]  : 1965 Date 3/6/2024       Current Admission Date: 3/1/2024  Current Admission Diagnosis:Ambulatory dysfunction   Patient Active Problem List    Diagnosis Date Noted    Urinary retention 2024    Nondisplaced fracture of greater trochanter of right femur, subsequent encounter for closed fracture with routine healing 2024    Closed nondisplaced fracture of proximal phalanx of left index finger with routine healing, subsequent encounter 10/30/2023    Pressure ulcer of sacral region, stage 3 (HCC) 2023    Ambulatory dysfunction 2022    Social problem 2021    Hypomagnesemia 2021    Thrombocytopathia ([facility]) 2020    Hyponatremia 2019    Metabolic encephalopathy 2019    Seborrheic dermatitis of scalp 2019    Nearsightedness 2019    Behavior concern in adult 2019    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HCC) 2019    Vitamin B12 deficiency 2017    Mixed hyperlipidemia 2016    Vitamin D deficiency 2016    Type 2 diabetes mellitus with diabetic neuropathy, without long-term current use of insulin ([facility]) 06/15/2016    Acquired hypothyroidism 06/15/2016    Cataract 2013    Cerebral palsy ([facility]) 2013    Generalized convulsive epilepsy ([facility]) 2013    Essential hypertension 2013    Osteoporosis 2013    Scoliosis 2013      LOS (days): 5  Geometric Mean LOS (GMLOS) (days): 3.9  Days to GMLOS:-1.8     OBJECTIVE:  Risk of Unplanned Readmission Score: 20.92         Current admission status: Inpatient   Preferred Pharmacy:   [facility] - [address]  [address] [address]  Phone: [phone] Fax: [phone]    Primary Care Provider: HUNTER Brown    Primary Insurance: MEDICARE  [place]    DISCHARGE DETAILS:    Discharge planning discussed with:: cm had a teams meeting with Lila( ) & Blair (director of residential living) Madeline ( cm at [facility]) & Lay(foster sister) & Carolynn (supervisor)  Freedom of Choice: Yes  Comments - Freedom of Choice: rehab referrals have been sent                                                                                      Additional Comments: lani jhad a team meeting to discuss d/c plan- cm made them aware I have not been able to fins a snf for rehab- Deonte was reviewing- the staff is trying to find him a new home- Sara his former caregiveer would be willing but she needs to find a big home before she could accept, there is a couple in the [facility] area that could take this pt - the staff is working hard to find a safe an appropriate place for this pt - they will get to me on Friday to let me know the progress, cm will continue to work on finding a snf for rehab

## 2024-03-07 NOTE — PLAN OF CARE
Problem: PAIN - ADULT  Goal: Verbalizes/displays adequate comfort level or baseline comfort level  Description: Interventions:  - Encourage patient to monitor pain and request assistance  - Assess pain using appropriate pain scale  - Administer analgesics based on type and severity of pain and evaluate response  - Implement non-pharmacological measures as appropriate and evaluate response  - Consider cultural and social influences on pain and pain management  - Notify physician/advanced practitioner if interventions unsuccessful or patient reports new pain  Outcome: Progressing     Problem: INFECTION - ADULT  Goal: Absence or prevention of progression during hospitalization  Description: INTERVENTIONS:  - Assess and monitor for signs and symptoms of infection  - Monitor lab/diagnostic results  - Monitor all insertion sites, i.e. indwelling lines, tubes, and drains  - Monitor endotracheal if appropriate and nasal secretions for changes in amount and color  - [place] appropriate cooling/warming therapies per order  - Administer medications as ordered  - Instruct and encourage patient and family to use good hand hygiene technique  - Identify and instruct in appropriate isolation precautions for identified infection/condition  Outcome: Progressing  Goal: Absence of fever/infection during neutropenic period  Description: INTERVENTIONS:  - Monitor WBC    Outcome: Progressing     Problem: SAFETY ADULT  Goal: Patient will remain free of falls  Description: INTERVENTIONS:  - Educate patient/family on patient safety including physical limitations  - Instruct patient to call for assistance with activity   - Consult OT/PT to assist with strengthening/mobility   - Keep Call bell within reach  - Keep bed low and locked with side rails adjusted as appropriate  - Keep care items and personal belongings within reach  - Initiate and maintain comfort rounds  - Make Fall Risk Sign visible to staff  - Offer Toileting every 2 Hours,  in advance of need  - Initiate/Maintain bed alarm  - Obtain necessary fall risk management equipment: walker   - Apply yellow socks and bracelet for high fall risk patients  - Consider moving patient to room near nurses station  Outcome: Progressing  Goal: Maintain or return to baseline ADL function  Description: INTERVENTIONS:  -  Assess patient's ability to carry out ADLs; assess patient's baseline for ADL function and identify physical deficits which impact ability to perform ADLs (bathing, care of mouth/teeth, toileting, grooming, dressing, etc.)  - Assess/evaluate cause of self-care deficits   - Assess range of motion  - Assess patient's mobility; develop plan if impaired  - Assess patient's need for assistive devices and provide as appropriate  - Encourage maximum independence but intervene and supervise when necessary  - Involve family in performance of ADLs  - Assess for home care needs following discharge   - Consider OT consult to assist with ADL evaluation and planning for discharge  - Provide patient education as appropriate  Outcome: Progressing  Goal: Maintains/Returns to pre admission functional level  Description: INTERVENTIONS:  - Perform AM-PAC 6 Click Basic Mobility/ Daily Activity assessment daily.  - Set and communicate daily mobility goal to care team and patient/family/caregiver.   - Collaborate with rehabilitation services on mobility goals if consulted  - Perform Range of Motion 3 times a day.  - Reposition patient every 2 hours.  - Dangle patient 3 times a day  - Stand patient 3 times a day  - Ambulate patient 3 times a day  - Out of bed to chair 3 times a day   - Out of bed for meals 3 times a day  - Out of bed for toileting  - Record patient progress and toleration of activity level   Outcome: Progressing     Problem: DISCHARGE PLANNING  Goal: Discharge to home or other facility with appropriate resources  Description: INTERVENTIONS:  - Identify barriers to discharge w/patient and  caregiver  - Arrange for needed discharge resources and transportation as appropriate  - Identify discharge learning needs (meds, wound care, etc.)  - Arrange for interpretive services to assist at discharge as needed  - Refer to Case Management Department for coordinating discharge planning if the patient needs post-hospital services based on physician/advanced practitioner order or complex needs related to functional status, cognitive ability, or social support system  Outcome: Progressing     Problem: Knowledge Deficit  Goal: Patient/family/caregiver demonstrates understanding of disease process, treatment plan, medications, and discharge instructions  Description: Complete learning assessment and assess knowledge base.  Interventions:  - Provide teaching at level of understanding  - Provide teaching via preferred learning methods  Outcome: Progressing     Problem: Prexisting or High Potential for Compromised Skin Integrity  Goal: Skin integrity is maintained or improved  Description: INTERVENTIONS:  - Identify patients at risk for skin breakdown  - Assess and monitor skin integrity  - Assess and monitor nutrition and hydration status  - Monitor labs   - Assess for incontinence   - Turn and reposition patient  - Assist with mobility/ambulation  - Relieve pressure over bony prominences  - Avoid friction and shearing  - Provide appropriate hygiene as needed including keeping skin clean and dry  - Evaluate need for skin moisturizer/barrier cream  - Collaborate with interdisciplinary team   - Patient/family teaching  - Consider wound care consult   Outcome: Progressing

## 2024-03-07 NOTE — ASSESSMENT & PLAN NOTE
Lab Results   Component Value Date    HGBA1C 5.5 11/27/2023       Recent Labs     03/09/24  1056 03/09/24  1557 03/09/24  2029 03/10/24  0631   POCGLU 319* 108 196* 153*       Blood Sugar Average: Last 72 hrs:  (P) [id number]    Continue diabetic diet      Continue fingerstick blood sugar with sliding scale coverage  Hypoglycemia protocol

## 2024-03-07 NOTE — PROGRESS NOTES
[facility]  Progress Note  Name: Jairon Oconnell I  MRN: [id number]  Unit/Bed#: -01 I Date of Admission: 3/1/2024   Date of Service: 3/7/2024 I Hospital Day: 6    Assessment/Plan   * Ambulatory dysfunction  Assessment & Plan  Patient has nonoperative right hip greater trochanter fracture was seen and evaluated by orthopedic surgery on the previous admission  He was discharged to a skilled nursing facility agitated, broke a chair, and was reported to become aggressive with staff.  The facility will now not take him back.   Seen and evaluated by PT/OT medically cleared for discharge to skilled nursing facility  Psychiatry recommendations appreciated. No indication for inpatient psychiatric hospitalization at this time.  Recommendation is rehab/SNF  Case management following, input appreciated       Urinary retention  Assessment & Plan  A chapman catheter discontinued and started on tamsulosin (3/6)  Continue with urinary retention protocol      Pressure ulcer of sacral region, stage 3 (HCC)  Assessment & Plan  POA  Wound care recommendations appreciated: Scarring to the left lateral buttock from stage 3 pressure injury in the past. Scarring is intact and blanchable and hyperpigmented   Turn and reposition every 2 hours, frequent offloading      Acquired hypothyroidism  Assessment & Plan  Continue levothyroxine 150 mcg p.o. every morning      Essential hypertension  Assessment & Plan  Continue lisinopril 10 mg p.o. daily  Monitor blood pressure       Mixed hyperlipidemia  Assessment & Plan  Continue Pravachol 80 mg oral daily at bedtime      Generalized convulsive epilepsy (HCC)  Assessment & Plan  Continue Depakote DR 1000 mg p.o. nightly and 750 mg p.o. daily, Neurontin 400 mg p.o. 3 times daily, Vimpat 150 mg p.o. daily and 200 mg p.o. nightly and Keppra 1500 mg p.o. every 12 hours  Continue seizure precautions      Type 2 diabetes mellitus with diabetic neuropathy, without long-term  current use of insulin (HCC)  Assessment & Plan  Lab Results   Component Value Date    HGBA1C 5.5 11/27/2023       Recent Labs     03/06/24  1115 03/06/24  1614 03/06/24 2031 03/07/24  0747   POCGLU 118 150* 129 133         Blood Sugar Average: Last 72 hrs:  (P) 133.1    Continue diabetic diet     Continue fingerstick blood sugar with sliding scale coverage  Hypoglycemia protocol               VTE Pharmacologic Prophylaxis: VTE Score: 3 Moderate Risk (Score 3-4) - Pharmacological DVT Prophylaxis Ordered: enoxaparin (Lovenox).    Mobility:   Basic Mobility Inpatient Raw Score: 15  -HLM Goal: 4: Move to chair/commode  JH-HLM Achieved: 5: Stand (1 or more minutes)  HLM Goal NOT achieved. Continue with multidisciplinary rounding and encourage appropriate mobility to improve upon HLM goals.    Patient Centered Rounds: I performed bedside rounds with nursing staff today.   Discussions with Specialists or Other Care Team Provider: CM, OT/PT     Education and Discussions with Family / Patient:  patient was updated.     Total Time Spent on Date of Encounter in care of patient: 27 mins. This time was spent on one or more of the following: performing physical exam; counseling and coordination of care; obtaining or reviewing history; documenting in the medical record; reviewing/ordering tests, medications or procedures; communicating with other healthcare professionals and discussing with patient's family/caregivers.    Current Length of Stay: 6 day(s)  Current Patient Status: Inpatient   Certification Statement: The patient will continue to require additional inpatient hospital stay due to ambulatory gait dysfunction  Discharge Plan: Anticipate discharge in 24-48 hrs to rehab facility.    Code Status: Level 1 - Full Code    Subjective:   The patient was seen and examined.  He offers no complaint at this time.  Staff reports that he has been voiding throughout the night without any issues.     Objective:     Vitals:   Temp  (24hrs), Av.8 °F (36.6 °C), Min:97.4 °F (36.3 °C), Max:98.3 °F (36.8 °C)    Temp:  [97.4 °F (36.3 °C)-98.3 °F (36.8 °C)] 97.8 °F (36.6 °C)  HR:  [72-91] 72  Resp:  [18] 18  BP: (132-136)/(76-81) 133/78  SpO2:  [90 %-97 %] 97 %  Body mass index is 19.88 kg/m².     Input and Output Summary (last 24 hours):     Intake/Output Summary (Last 24 hours) at 3/7/2024 0831  Last data filed at 3/7/2024 0201  Gross per 24 hour   Intake 1020 ml   Output 1300 ml   Net -280 ml       Physical Exam:   Physical Exam  Vitals and nursing note reviewed.   Constitutional:       General: He is not in acute distress.     Appearance: Normal appearance.      Comments: Frail and appearing older than age   HENT:      Head: Normocephalic and atraumatic.      Right Ear: External ear normal.      Left Ear: External ear normal.      Nose: Nose normal.      Mouth/Throat:      Mouth: Mucous membranes are moist.      Pharynx: Oropharynx is clear.   Eyes:      General:         Right eye: No discharge.         Left eye: No discharge.      Extraocular Movements: Extraocular movements intact.      Pupils: Pupils are equal, round, and reactive to light.   Cardiovascular:      Rate and Rhythm: Normal rate and regular rhythm.      Pulses: Normal pulses.      Heart sounds: Normal heart sounds. No murmur heard.  Pulmonary:      Effort: Pulmonary effort is normal. No respiratory distress.      Breath sounds: Normal breath sounds. No wheezing or rales.   Abdominal:      General: Bowel sounds are normal. There is no distension.      Palpations: Abdomen is soft. There is no mass.      Tenderness: There is no abdominal tenderness.   Musculoskeletal:         General: No swelling, tenderness or deformity. Normal range of motion.      Cervical back: Normal range of motion and neck supple. No rigidity.   Skin:     General: Skin is warm and dry.      Capillary Refill: Capillary refill takes less than 2 seconds.      Coloration: Skin is not pale.      Findings: No  erythema.   Neurological:      General: No focal deficit present.      Mental Status: He is alert. Mental status is at baseline.      Comments: With periods of forgetfulness/confusion    Psychiatric:         Attention and Perception: Attention normal.         Mood and Affect: Affect is flat.         Cognition and Memory: Cognition is impaired.       Additional Data:     Labs:                  Results from last 7 days   Lab Units 03/07/24  0747 03/06/24  2031 03/06/24  1614 03/06/24  1115 03/06/24  0700 03/05/24  2048 03/05/24  1610 03/05/24  1121 03/05/24  0701 03/04/24  2057 03/04/24  1600 03/04/24  1113   POC GLUCOSE mg/dl 133 129 150* 118 136 191* 118 118 102 136 137 144*               Lines/Drains:  Invasive Devices       None                 Urinary Catheter:  Goal for removal: Voiding trial when ambulation improves               Imaging: Reviewed radiology reports from this admission including: CT head and xray(s)    Recent Cultures (last 7 days):         Last 24 Hours Medication List:   Current Facility-Administered Medications   Medication Dose Route Frequency Provider Last Rate    acetaminophen  650 mg Oral Q6H PRN Hasmukh Becker PA-C      divalproex sodium  1,000 mg Oral HS Hasmukh Becker PA-C      divalproex sodium  750 mg Oral Daily Hasmukh Becker PA-C      docusate sodium  100 mg Oral BID PRN Hasmukh Becker PA-C      enoxaparin  40 mg Subcutaneous Daily Hasmukh Becker PA-C      gabapentin  400 mg Oral TID Hasmukh Becker PA-C      ibuprofen  600 mg Oral Q6H PRN Ancelmo Mota PA-C      insulin lispro  1-5 Units Subcutaneous TID AC Hasmukh Becker PA-C      insulin lispro  1-6 Units Subcutaneous HS Hasmukh Becker PA-C      lacosamide  150 mg Oral Daily Hasmukh Becker PA-C      lacosamide  200 mg Oral HS Hasmukh Becker PA-C      levETIRAcetam  1,500 mg Oral Q12H [facility] Hasmukh Becker PA-C      levothyroxine  150 mcg Oral Daily Hasmukh Becker PA-C      lisinopril  10 mg Oral Daily Hasmukh Becker PA-C      loratadine   10 mg Oral Daily Hasmukh Becker PA-C      multivitamin stress formula  1 tablet Oral Daily Hasmukh Becker PA-C      OLANZapine  5 mg Intramuscular Q3H PRN Hasmukh Becker PA-C      pravastatin  80 mg Oral Daily With Dinner Hasmukh Becker PA-C      QUEtiapine  200 mg Oral BID Hasmukh Becker PA-C      QUEtiapine  400 mg Oral HS Hasmukh Becker PA-C      tamsulosin  0.4 mg Oral Daily With Dinner HUNTER Lopez      temazepam  15 mg Oral HS PRN Hasmukh Becker PA-C      zonisamide  300 mg Oral HS Hasmukh Becker PA-C          Today, Patient Was Seen By: HUNTER Lopez    **Please Note: This note may have been constructed using a voice recognition system.**

## 2024-03-07 NOTE — ASSESSMENT & PLAN NOTE
S/p chapman catheter, since discontinued   Continue tamsulosin  Continue urinary retention protocol

## 2024-03-07 NOTE — ASSESSMENT & PLAN NOTE
POA  Wound care recommendations appreciated: Scarring to the left lateral buttock from stage 3 pressure injury in the past. Scarring is intact and blanchable and hyperpigmented   Turn and reposition every 2 hours, frequent offloading

## 2024-03-08 LAB
GLUCOSE SERPL-MCNC: 105 MG/DL (ref 65–140)
GLUCOSE SERPL-MCNC: 141 MG/DL (ref 65–140)
GLUCOSE SERPL-MCNC: 145 MG/DL (ref 65–140)
GLUCOSE SERPL-MCNC: 153 MG/DL (ref 65–140)
VALPROATE FREE SERPL-MCNC: 23.8 UG/ML (ref 6–22)

## 2024-03-08 PROCEDURE — 82948 REAGENT STRIP/BLOOD GLUCOSE: CPT

## 2024-03-08 PROCEDURE — 97530 THERAPEUTIC ACTIVITIES: CPT

## 2024-03-08 PROCEDURE — 99231 SBSQ HOSP IP/OBS SF/LOW 25: CPT | Performed by: NURSE PRACTITIONER

## 2024-03-08 RX ORDER — QUETIAPINE 150 MG/1
300 TABLET, FILM COATED, EXTENDED RELEASE ORAL
Status: DISCONTINUED | OUTPATIENT
Start: 2024-03-08 | End: 2024-04-01 | Stop reason: HOSPADM

## 2024-03-08 RX ORDER — QUETIAPINE 150 MG/1
150 TABLET, FILM COATED, EXTENDED RELEASE ORAL 2 TIMES DAILY
Status: DISCONTINUED | OUTPATIENT
Start: 2024-03-08 | End: 2024-04-01 | Stop reason: HOSPADM

## 2024-03-08 RX ADMIN — TAMSULOSIN HYDROCHLORIDE 0.4 MG: 0.4 CAPSULE ORAL at 17:40

## 2024-03-08 RX ADMIN — GABAPENTIN 400 MG: 400 CAPSULE ORAL at 17:40

## 2024-03-08 RX ADMIN — GABAPENTIN 400 MG: 400 CAPSULE ORAL at 00:45

## 2024-03-08 RX ADMIN — LACOSAMIDE 200 MG: 150 TABLET ORAL at 21:47

## 2024-03-08 RX ADMIN — QUETIAPINE FUMARATE 300 MG: 150 TABLET, EXTENDED RELEASE ORAL at 21:47

## 2024-03-08 RX ADMIN — TEMAZEPAM 15 MG: 7.5 CAPSULE ORAL at 00:46

## 2024-03-08 RX ADMIN — B-COMPLEX W/ C & FOLIC ACID TAB 1 TABLET: TAB at 08:12

## 2024-03-08 RX ADMIN — QUETIAPINE FUMARATE 200 MG: 150 TABLET, EXTENDED RELEASE ORAL at 08:21

## 2024-03-08 RX ADMIN — GABAPENTIN 400 MG: 400 CAPSULE ORAL at 08:12

## 2024-03-08 RX ADMIN — DIVALPROEX SODIUM 1000 MG: 500 TABLET, DELAYED RELEASE ORAL at 21:47

## 2024-03-08 RX ADMIN — LACOSAMIDE 150 MG: 150 TABLET ORAL at 08:12

## 2024-03-08 RX ADMIN — LORATADINE 10 MG: 10 TABLET ORAL at 08:13

## 2024-03-08 RX ADMIN — DIVALPROEX SODIUM 750 MG: 250 TABLET, DELAYED RELEASE ORAL at 08:12

## 2024-03-08 RX ADMIN — QUETIAPINE FUMARATE 150 MG: 150 TABLET, EXTENDED RELEASE ORAL at 14:20

## 2024-03-08 RX ADMIN — LEVETIRACETAM 1500 MG: 500 TABLET, FILM COATED ORAL at 08:12

## 2024-03-08 RX ADMIN — ZONISAMIDE 300 MG: 100 CAPSULE ORAL at 21:46

## 2024-03-08 RX ADMIN — LISINOPRIL 10 MG: 10 TABLET ORAL at 08:12

## 2024-03-08 RX ADMIN — GABAPENTIN 400 MG: 400 CAPSULE ORAL at 21:47

## 2024-03-08 RX ADMIN — LEVETIRACETAM 1500 MG: 500 TABLET, FILM COATED ORAL at 21:46

## 2024-03-08 RX ADMIN — ENOXAPARIN SODIUM 40 MG: 40 INJECTION SUBCUTANEOUS at 08:12

## 2024-03-08 RX ADMIN — LEVOTHYROXINE SODIUM 150 MCG: 75 TABLET ORAL at 08:12

## 2024-03-08 RX ADMIN — INSULIN LISPRO 1 UNITS: 100 INJECTION, SOLUTION INTRAVENOUS; SUBCUTANEOUS at 12:00

## 2024-03-08 RX ADMIN — PRAVASTATIN SODIUM 80 MG: 40 TABLET ORAL at 17:40

## 2024-03-08 NOTE — OCCUPATIONAL THERAPY NOTE
03/08/24 1202   OT Last Visit   OT Visit Date 03/08/24   Note Type   Note Type Treatment   Pain Assessment   Pain Assessment Tool FLACC   Pain Rating: FLACC (Rest) - Face 0   Pain Rating: FLACC (Rest) - Legs 0   Pain Rating: FLACC (Rest) - Activity 0   Pain Rating: FLACC (Rest) - Cry 0   Pain Rating: FLACC (Rest) - Consolability 0   Score: FLACC (Rest) 0   Pain Rating: FLACC (Activity) - Face 0   Pain Rating: FLACC (Activity) - Legs 1  (patient seemed have stiff legs from them being in bent position during sleep (patient awakened for treatment and personal care - was incontinent of urine))   Pain Rating: FLACC (Activity) - Activity 0   Pain Rating: FLACC (Activity) - Cry 0   Pain Rating: FLACC (Activity) - Consolability 0   Score: FLACC (Activity) 1   Restrictions/Precautions   Weight Bearing Precautions Per Order Yes   RLE Weight Bearing Per Order WBAT   Other Precautions Impulsive;Cognitive;Chair Alarm;Bed Alarm;Fall Risk;Pain   Bed Mobility   Rolling R 3  Moderate assistance   Additional items Assist x 1;Bedrails;Increased time required;Verbal cues;LE management   Rolling L 4  Minimal assistance   Additional items Assist x 1;Bedrails;Verbal cues;Increased time required   Additional Comments Attempted to have patient begin eating lunch post Bed Mobility tasks (and boosting - per 2 staff - for upright position)   Cognition   Overall Cognitive Status Impaired   Arousal/Participation Arousable;Cooperative   Attention Difficulty attending to directions   Following Commands Follows one step commands inconsistently   Comments Patient more lethargic at this time > nurse Perez aware; Jairon did not engage in having lunch at this time despite prompting.   Activity Tolerance   Activity Tolerance Patient limited by fatigue;Patient limited by pain   Medical Staff Made Aware nurse Perez aware of session's outcome   Assessment   Assessment Patient participated in Skilled OT session this date with interventions consisting  of  therapeutic activities to: increase activity tolerance . Patient agreeable to OT treatment session, upon arrival patient was found supine in bed and sleepy (positioned on L side with knees bent and drawn up toward body).  He attempted to cover his head and continue to sleep upon my approach with the nurse. Patient requiring frequent re direction, verbal cues for correct technique, and one step directives, and self-care assistance as noted in flow sheet/AM-PAC.  *Please see Flow Sheet for further details.  Patient continues to be functioning below baseline level, occupational performance remains limited secondary to factors listed above and increased risk for falls and injury.   Patient to benefit from continued Occupational Therapy treatment while in the hospital to address deficits as defined above and maximize level of functional independence with ADLs and functional mobility.   Plan   Treatment Interventions Functional transfer training;Activityengagement   Goal Expiration Date 03/14/24   OT Treatment Day 1   Discharge Recommendation   Rehab Resource Intensity Level, OT II (Moderate Resource Intensity)   Additional Comments 2 The patient's raw score on the AM-PAC Daily Activity Inpatient Short Form is 15. A raw score of less than 19 suggests the patient may benefit from discharge to post-acute rehabilitation services. Please refer to the recommendation of the Occupational Therapist for safe discharge planning.   AM-PAC Daily Activity Inpatient   Lower Body Dressing 2   Bathing 2   Toileting 2   Upper Body Dressing 3   Grooming 3   Eating 3   Daily Activity Raw Score 15   Daily Activity Standardized Score (Calc for Raw Score >=11) 34.69   AM-PAC Applied Cognition Inpatient   Following a Speech/Presentation 2   Understanding Ordinary Conversation 3   Taking Medications 2   Remembering Where Things Are Placed or Put Away 1   Remembering List of 4-5 Errands 1   Taking Care of Complicated Tasks 1   Applied  Cognition Raw Score 10   Applied Cognition Standardized Score 24.98       OLAYINKA Adair/L

## 2024-03-08 NOTE — PLAN OF CARE
Problem: PAIN - ADULT  Goal: Verbalizes/displays adequate comfort level or baseline comfort level  Description: Interventions:  - Encourage patient to monitor pain and request assistance  - Assess pain using appropriate pain scale  - Administer analgesics based on type and severity of pain and evaluate response  - Implement non-pharmacological measures as appropriate and evaluate response  - Consider cultural and social influences on pain and pain management  - Notify physician/advanced practitioner if interventions unsuccessful or patient reports new pain  Outcome: Progressing     Problem: INFECTION - ADULT  Goal: Absence or prevention of progression during hospitalization  Description: INTERVENTIONS:  - Assess and monitor for signs and symptoms of infection  - Monitor lab/diagnostic results  - Monitor all insertion sites, i.e. indwelling lines, tubes, and drains  - Monitor endotracheal if appropriate and nasal secretions for changes in amount and color  - [place] appropriate cooling/warming therapies per order  - Administer medications as ordered  - Instruct and encourage patient and family to use good hand hygiene technique  - Identify and instruct in appropriate isolation precautions for identified infection/condition  Outcome: Progressing  Goal: Absence of fever/infection during neutropenic period  Description: INTERVENTIONS:  - Monitor WBC    Outcome: Progressing     Problem: SAFETY ADULT  Goal: Patient will remain free of falls  Description: INTERVENTIONS:  - Educate patient/family on patient safety including physical limitations  - Instruct patient to call for assistance with activity   - Consult OT/PT to assist with strengthening/mobility   - Keep Call bell within reach  - Keep bed low and locked with side rails adjusted as appropriate  - Keep care items and personal belongings within reach  - Initiate and maintain comfort rounds  - Make Fall Risk Sign visible to staff  - Offer Toileting every 2 Hours,  in advance of need  - Initiate/Maintain bed alarm  - Obtain necessary fall risk management equipment:  socks  - Apply yellow socks and bracelet for high fall risk patients  - Consider moving patient to room near nurses station  Outcome: Progressing  Goal: Maintain or return to baseline ADL function  Description: INTERVENTIONS:  -  Assess patient's ability to carry out ADLs; assess patient's baseline for ADL function and identify physical deficits which impact ability to perform ADLs (bathing, care of mouth/teeth, toileting, grooming, dressing, etc.)  - Assess/evaluate cause of self-care deficits   - Assess range of motion  - Assess patient's mobility; develop plan if impaired  - Assess patient's need for assistive devices and provide as appropriate  - Encourage maximum independence but intervene and supervise when necessary  - Involve family in performance of ADLs  - Assess for home care needs following discharge   - Consider OT consult to assist with ADL evaluation and planning for discharge  - Provide patient education as appropriate  Outcome: Progressing  Goal: Maintains/Returns to pre admission functional level  Description: INTERVENTIONS:  - Perform AM-PAC 6 Click Basic Mobility/ Daily Activity assessment daily.  - Set and communicate daily mobility goal to care team and patient/family/caregiver.   - Collaborate with rehabilitation services on mobility goals if consulted  - Perform Range of Motion 3 times a day.  - Reposition patient every 2 hours.  - Dangle patient 3 times a day  - Stand patient 3 times a day  - Ambulate patient 3 times a day  - Out of bed to chair 3 times a day   - Out of bed for meals 3 times a day  - Out of bed for toileting  - Record patient progress and toleration of activity level   Outcome: Progressing     Problem: DISCHARGE PLANNING  Goal: Discharge to home or other facility with appropriate resources  Description: INTERVENTIONS:  - Identify barriers to discharge w/patient and  caregiver  - Arrange for needed discharge resources and transportation as appropriate  - Identify discharge learning needs (meds, wound care, etc.)  - Arrange for interpretive services to assist at discharge as needed  - Refer to Case Management Department for coordinating discharge planning if the patient needs post-hospital services based on physician/advanced practitioner order or complex needs related to functional status, cognitive ability, or social support system  Outcome: Progressing     Problem: Knowledge Deficit  Goal: Patient/family/caregiver demonstrates understanding of disease process, treatment plan, medications, and discharge instructions  Description: Complete learning assessment and assess knowledge base.  Interventions:  - Provide teaching at level of understanding  - Provide teaching via preferred learning methods  Outcome: Progressing     Problem: Prexisting or High Potential for Compromised Skin Integrity  Goal: Skin integrity is maintained or improved  Description: INTERVENTIONS:  - Identify patients at risk for skin breakdown  - Assess and monitor skin integrity  - Assess and monitor nutrition and hydration status  - Monitor labs   - Assess for incontinence   - Turn and reposition patient  - Assist with mobility/ambulation  - Relieve pressure over bony prominences  - Avoid friction and shearing  - Provide appropriate hygiene as needed including keeping skin clean and dry  - Evaluate need for skin moisturizer/barrier cream  - Collaborate with interdisciplinary team   - Patient/family teaching  - Consider wound care consult   Outcome: Progressing

## 2024-03-08 NOTE — PROGRESS NOTES
[facility]  Progress Note  Name: Jairon Oconnell I  MRN: [id number]  Unit/Bed#: -01 I Date of Admission: 3/1/2024   Date of Service: 3/8/2024 I Hospital Day: 7    Assessment/Plan   * Ambulatory dysfunction  Assessment & Plan  Patient has nonoperative right hip greater trochanter fracture was seen and evaluated by orthopedic surgery on the previous admission  He was discharged to a skilled nursing facility agitated, broke a chair, and was reported to become aggressive with staff.  The facility will now not take him back.   Seen and evaluated by PT/OT medically cleared for discharge to skilled nursing facility  Psychiatry recommendations appreciated. No indication for inpatient psychiatric hospitalization at this time.  Recommendation is rehab/SNF  Case management following, input appreciated        Urinary retention  Assessment & Plan  A chapman catheter discontinued and started on tamsulosin (3/6)  Continue with urinary retention protocol       Pressure ulcer of sacral region, stage 3 (HCC)  Assessment & Plan  POA  Wound care recommendations appreciated: Scarring to the left lateral buttock from stage 3 pressure injury in the past. Scarring is intact and blanchable and hyperpigmented   Turn and reposition every 2 hours, frequent offloading       Acquired hypothyroidism  Assessment & Plan  Continue levothyroxine 150 mcg p.o. every morning       Essential hypertension  Assessment & Plan  Continue lisinopril 10 mg p.o. daily  Monitor blood pressure        Mixed hyperlipidemia  Assessment & Plan  Continue Pravachol 80 mg oral daily at bedtime       Generalized convulsive epilepsy (HCC)  Assessment & Plan  Continue Depakote DR 1000 mg p.o. nightly and 750 mg p.o. daily, Neurontin 400 mg p.o. 3 times daily, Vimpat 150 mg p.o. daily and 200 mg p.o. nightly and Keppra 1500 mg p.o. every 12 hours  Continue seizure precautions       Type 2 diabetes mellitus with diabetic neuropathy, without  long-term current use of insulin (HCC)  Assessment & Plan  Lab Results   Component Value Date    HGBA1C 5.5 11/27/2023       Recent Labs     03/07/24  1108 03/07/24  1612 03/07/24  2136 03/08/24  0726   POCGLU 157* 122 127 105       Blood Sugar Average: Last 72 hrs:  (P) [id number]    Blood sugar has been very well controlled   Continue diabetic diet     Continue fingerstick blood sugar with sliding scale coverage  Hypoglycemia protocol               VTE Pharmacologic Prophylaxis: VTE Score: 3 Moderate Risk (Score 3-4) - Pharmacological DVT Prophylaxis Ordered: enoxaparin (Lovenox).    Mobility:   Basic Mobility Inpatient Raw Score: 15  JH-HLM Goal: 4: Move to chair/commode  JH-HLM Achieved: 4: Move to chair/commode  HLM Goal NOT achieved. Continue with multidisciplinary rounding and encourage appropriate mobility to improve upon HLM goals.    Patient Centered Rounds: I performed bedside rounds with nursing staff today.   Discussions with Specialists or Other Care Team Provider: CM, OT/PT     Education and Discussions with Family / Patient:  patient was updated.     Total Time Spent on Date of Encounter in care of patient: 26 mins. This time was spent on one or more of the following: performing physical exam; counseling and coordination of care; obtaining or reviewing history; documenting in the medical record; reviewing/ordering tests, medications or procedures; communicating with other healthcare professionals and discussing with patient's family/caregivers.    Current Length of Stay: 7 day(s)  Current Patient Status: Inpatient   Certification Statement: The patient will continue to require additional inpatient hospital stay due to ambulatory gait dysfunction  Discharge Plan: Anticipate discharge in 24-48 hrs to rehab facility.    Code Status: Level 1 - Full Code    Subjective:   The patient was seen and examined.  He offers no complaint at this time. No acute event overnight.     Objective:     Vitals:    Temp (24hrs), Av °F (36.7 °C), Min:97.4 °F (36.3 °C), Max:98.8 °F (37.1 °C)    Temp:  [97.4 °F (36.3 °C)-98.8 °F (37.1 °C)] 97.4 °F (36.3 °C)  HR:  [90-91] 91  Resp:  [18] 18  BP: (101-140)/(54-82) 101/54  SpO2:  [93 %-94 %] 93 %  Body mass index is 19.88 kg/m².     Input and Output Summary (last 24 hours):     Intake/Output Summary (Last 24 hours) at 3/8/2024 1059  Last data filed at 3/8/2024 0201  Gross per 24 hour   Intake 920 ml   Output 350 ml   Net 570 ml       Physical Exam:   Physical Exam  Vitals and nursing note reviewed.   Constitutional:       General: He is not in acute distress.     Appearance: Normal appearance.      Comments: Frail and appearing older than age   HENT:      Head: Normocephalic and atraumatic.      Right Ear: External ear normal.      Left Ear: External ear normal.      Nose: Nose normal.      Mouth/Throat:      Mouth: Mucous membranes are moist.      Pharynx: Oropharynx is clear.   Eyes:      General:         Right eye: No discharge.         Left eye: No discharge.      Extraocular Movements: Extraocular movements intact.      Pupils: Pupils are equal, round, and reactive to light.   Cardiovascular:      Rate and Rhythm: Normal rate and regular rhythm.      Pulses: Normal pulses.      Heart sounds: Normal heart sounds. No murmur heard.  Pulmonary:      Effort: Pulmonary effort is normal. No respiratory distress.      Breath sounds: Normal breath sounds. No wheezing or rales.   Abdominal:      General: Bowel sounds are normal. There is no distension.      Palpations: Abdomen is soft. There is no mass.      Tenderness: There is no abdominal tenderness.   Musculoskeletal:         General: No swelling, tenderness or deformity. Normal range of motion.      Cervical back: Normal range of motion and neck supple. No rigidity.   Skin:     General: Skin is warm and dry.      Capillary Refill: Capillary refill takes less than 2 seconds.      Coloration: Skin is not pale.      Findings: No  erythema.   Neurological:      General: No focal deficit present.      Mental Status: He is alert. Mental status is at baseline.      Comments: With periods of forgetfulness/confusion    Psychiatric:         Attention and Perception: Attention normal.         Mood and Affect: Affect is flat.         Cognition and Memory: Cognition is impaired.       Additional Data:     Labs:                  Results from last 7 days   Lab Units 03/08/24  0726 03/07/24  2136 03/07/24  1612 03/07/24  1108 03/07/24  0747 03/06/24  2031 03/06/24  1614 03/06/24  1115 03/06/24  0700 03/05/24  2048 03/05/24  1610 03/05/24  1121   POC GLUCOSE mg/dl 105 127 122 157* 133 129 150* 118 136 191* 118 118               Lines/Drains:  Invasive Devices       None                 Urinary Catheter:  Goal for removal: Voiding trial when ambulation improves               Imaging: Reviewed radiology reports from this admission including: CT head and xray(s)    Recent Cultures (last 7 days):         Last 24 Hours Medication List:   Current Facility-Administered Medications   Medication Dose Route Frequency Provider Last Rate    acetaminophen  650 mg Oral Q6H PRN Hasmukh Becker PA-C      divalproex sodium  1,000 mg Oral HS Hasmukh Becker PA-C      divalproex sodium  750 mg Oral Daily Hasmukh Becker PA-C      docusate sodium  100 mg Oral BID PRN [place] Fofana-DENIA      enoxaparin  40 mg Subcutaneous Daily Hasmukh Becker PA-C      gabapentin  400 mg Oral TID [place] Fofana-DENIA      ibuprofen  600 mg Oral Q6H PRN [place] Mast-DENIA      insulin lispro  1-5 Units Subcutaneous TID AC Hasmukh Becker, PA-DENIA      insulin lispro  1-6 Units Subcutaneous HS [place] Fofana-DENIA      lacosamide  150 mg Oral Daily Hasmukh Becker PA-C      lacosamide  200 mg Oral HS [place] Fofana-DENIA      levETIRAcetam  1,500 mg Oral Q12H [facility] [place] Fofana-DENIA      levothyroxine  150 mcg Oral Daily Hasmukh Becker PA-C      lisinopril  10 mg Oral Daily Hasmukh Becker PA-C      loratadine   10 mg Oral Daily Hasmukh Becker PA-C      multivitamin stress formula  1 tablet Oral Daily Hasmukh Becker PA-C      OLANZapine  5 mg Intramuscular Q3H PRN Hasmukh Becker PA-C      pravastatin  80 mg Oral Daily With Dinner Hasmukh Becker PA-C      QUEtiapine  200 mg Oral BID Hasmukh Becker PA-C      QUEtiapine  400 mg Oral HS Hasmukh Becker PA-C      tamsulosin  0.4 mg Oral Daily With Dinner HUNTER Lopez      temazepam  15 mg Oral HS PRN Hasmukh Becker PA-C      zonisamide  300 mg Oral HS Hasmukh Becker PA-C          Today, Patient Was Seen By: HUNTER Lopez    **Please Note: This note may have been constructed using a voice recognition system.**

## 2024-03-08 NOTE — CASE MANAGEMENT
Case Management Discharge Planning Note    Patient name Jairon Oconnell  Location /- MRN [id number]  : 1965 Date 3/8/2024       Current Admission Date: 3/1/2024  Current Admission Diagnosis:Ambulatory dysfunction   Patient Active Problem List    Diagnosis Date Noted    Urinary retention 2024    Nondisplaced fracture of greater trochanter of right femur, subsequent encounter for closed fracture with routine healing 2024    Closed nondisplaced fracture of proximal phalanx of left index finger with routine healing, subsequent encounter 10/30/2023    Pressure ulcer of sacral region, stage 3 (HCC) 2023    Ambulatory dysfunction 2022    Social problem 2021    Hypomagnesemia 2021    Thrombocytopathia ([facility]) 2020    Hyponatremia 2019    Metabolic encephalopathy 2019    Seborrheic dermatitis of scalp 2019    Nearsightedness 2019    Behavior concern in adult 2019    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HCC) 2019    Vitamin B12 deficiency 2017    Mixed hyperlipidemia 2016    Vitamin D deficiency 2016    Type 2 diabetes mellitus with diabetic neuropathy, without long-term current use of insulin ([facility]) 06/15/2016    Acquired hypothyroidism 06/15/2016    Cataract 2013    Cerebral palsy ([facility]) 2013    Generalized convulsive epilepsy ([facility]) 2013    Essential hypertension 2013    Osteoporosis 2013    Scoliosis 2013      LOS (days): 7  Geometric Mean LOS (GMLOS) (days): 3.9  Days to GMLOS:-3.6     OBJECTIVE:  Risk of Unplanned Readmission Score: 21.46         Current admission status: Inpatient   Preferred Pharmacy:   [facility] [place] [facility] - [address]  [address] [address]  Phone: [phone] Fax: [phone]    Primary Care Provider: HUNTER Brown    Primary Insurance: MEDICARE  Secondary Insurance[place] [employer]  [place]    DISCHARGE DETAILS:       Freedom of Choice: Yes  Comments - Freedom of Choice: rehab recommended-  additional referrals sent-  Lo his support coordiantor is having a staff meeting today to discuss if pt has a new lifesharing home that he may be able to be d/c to- no snf bed offered- additonal referrals were sent & cm expanded the search                Contacts  Patient Contacts: Lila Rogers  Relationship to Patient:: Other (Comment)  Contact Method: Phone  Phone Number: [phone] LM to make her aware [facility] cannot offer a bed              Other Referral/Resources/Interventions Provided:  Interventions: Short Term Rehab  Referral Comments: additonal referrals sent    Would you like to participate in our [facility] service program?  : No - Declined    Treatment Team Recommendation:  (d/c plan tbd- TBD)

## 2024-03-08 NOTE — PLAN OF CARE
Problem: OCCUPATIONAL THERAPY ADULT  Goal: Performs self-care activities at highest level of function for planned discharge setting.  See evaluation for individualized goals.  Description: Treatment Interventions: ADL retraining, Functional transfer training, UE strengthening/ROM, Endurance training, Patient/family training, Equipment evaluation/education, Compensatory technique education, Energy conservation, Activityengagement    See flowsheet documentation for full assessment, interventions and recommendations.   Outcome: Not Progressing  Note: Limitation: Decreased ADL status, Decreased UE strength, Decreased Safe judgement during ADL, Decreased endurance, Decreased self-care trans, Decreased high-level ADLs, Decreased cognition  Prognosis: Fair  Assessment: Patient participated in Skilled OT session this date with interventions consisting of  therapeutic activities to: increase activity tolerance . Patient agreeable to OT treatment session, upon arrival patient was found supine in bed and sleepy (positioned on L side with knees bent and drawn up toward body).  He attempted to cover his head and continue to sleep upon my approach with the nurse. Patient requiring frequent re direction, verbal cues for correct technique, and one step directives, and self-care assistance as noted in flow sheet/AM-PAC.  *Please see Flow Sheet for further details.  Patient continues to be functioning below baseline level, occupational performance remains limited secondary to factors listed above and increased risk for falls and injury.   Patient to benefit from continued Occupational Therapy treatment while in the hospital to address deficits as defined above and maximize level of functional independence with ADLs and functional mobility.     Rehab Resource Intensity Level, OT: II (Moderate Resource Intensity)     ** Due to today's lethargy, difficult to assess true progress.  DELFINO Adair

## 2024-03-09 LAB
GLUCOSE SERPL-MCNC: 108 MG/DL (ref 65–140)
GLUCOSE SERPL-MCNC: 120 MG/DL (ref 65–140)
GLUCOSE SERPL-MCNC: 196 MG/DL (ref 65–140)
GLUCOSE SERPL-MCNC: 319 MG/DL (ref 65–140)

## 2024-03-09 PROCEDURE — 82948 REAGENT STRIP/BLOOD GLUCOSE: CPT

## 2024-03-09 PROCEDURE — 99231 SBSQ HOSP IP/OBS SF/LOW 25: CPT | Performed by: NURSE PRACTITIONER

## 2024-03-09 RX ADMIN — DIVALPROEX SODIUM 1000 MG: 500 TABLET, DELAYED RELEASE ORAL at 21:28

## 2024-03-09 RX ADMIN — TAMSULOSIN HYDROCHLORIDE 0.4 MG: 0.4 CAPSULE ORAL at 16:03

## 2024-03-09 RX ADMIN — LORATADINE 10 MG: 10 TABLET ORAL at 08:17

## 2024-03-09 RX ADMIN — GABAPENTIN 400 MG: 400 CAPSULE ORAL at 08:17

## 2024-03-09 RX ADMIN — IBUPROFEN 600 MG: 600 TABLET, FILM COATED ORAL at 16:38

## 2024-03-09 RX ADMIN — GABAPENTIN 400 MG: 400 CAPSULE ORAL at 16:03

## 2024-03-09 RX ADMIN — QUETIAPINE FUMARATE 300 MG: 150 TABLET, EXTENDED RELEASE ORAL at 21:28

## 2024-03-09 RX ADMIN — LACOSAMIDE 200 MG: 150 TABLET ORAL at 21:27

## 2024-03-09 RX ADMIN — LEVETIRACETAM 1500 MG: 500 TABLET, FILM COATED ORAL at 08:17

## 2024-03-09 RX ADMIN — LISINOPRIL 10 MG: 10 TABLET ORAL at 08:17

## 2024-03-09 RX ADMIN — LEVETIRACETAM 1500 MG: 500 TABLET, FILM COATED ORAL at 20:26

## 2024-03-09 RX ADMIN — B-COMPLEX W/ C & FOLIC ACID TAB 1 TABLET: TAB at 08:17

## 2024-03-09 RX ADMIN — DOCUSATE SODIUM 100 MG: 100 CAPSULE, LIQUID FILLED ORAL at 21:28

## 2024-03-09 RX ADMIN — PRAVASTATIN SODIUM 80 MG: 40 TABLET ORAL at 16:03

## 2024-03-09 RX ADMIN — ENOXAPARIN SODIUM 40 MG: 40 INJECTION SUBCUTANEOUS at 08:16

## 2024-03-09 RX ADMIN — GABAPENTIN 400 MG: 400 CAPSULE ORAL at 20:26

## 2024-03-09 RX ADMIN — LEVOTHYROXINE SODIUM 150 MCG: 75 TABLET ORAL at 08:17

## 2024-03-09 RX ADMIN — ZONISAMIDE 300 MG: 100 CAPSULE ORAL at 21:27

## 2024-03-09 RX ADMIN — QUETIAPINE FUMARATE 150 MG: 150 TABLET, EXTENDED RELEASE ORAL at 08:17

## 2024-03-09 RX ADMIN — QUETIAPINE FUMARATE 150 MG: 150 TABLET, EXTENDED RELEASE ORAL at 14:37

## 2024-03-09 RX ADMIN — INSULIN LISPRO 2 UNITS: 100 INJECTION, SOLUTION INTRAVENOUS; SUBCUTANEOUS at 21:32

## 2024-03-09 RX ADMIN — DIVALPROEX SODIUM 750 MG: 250 TABLET, DELAYED RELEASE ORAL at 08:16

## 2024-03-09 RX ADMIN — INSULIN LISPRO 3 UNITS: 100 INJECTION, SOLUTION INTRAVENOUS; SUBCUTANEOUS at 11:08

## 2024-03-09 RX ADMIN — LACOSAMIDE 150 MG: 150 TABLET ORAL at 08:18

## 2024-03-09 NOTE — ASSESSMENT & PLAN NOTE
He has been cooperative without any agitation or behavioral issues   Seroquel XR decreased to 150 BID and 300 HS due to excessive sleepiness

## 2024-03-09 NOTE — PLAN OF CARE
Problem: PAIN - ADULT  Goal: Verbalizes/displays adequate comfort level or baseline comfort level  Description: Interventions:  - Encourage patient to monitor pain and request assistance  - Assess pain using appropriate pain scale  - Administer analgesics based on type and severity of pain and evaluate response  - Implement non-pharmacological measures as appropriate and evaluate response  - Consider cultural and social influences on pain and pain management  - Notify physician/advanced practitioner if interventions unsuccessful or patient reports new pain  Outcome: Progressing     Problem: INFECTION - ADULT  Goal: Absence or prevention of progression during hospitalization  Description: INTERVENTIONS:  - Assess and monitor for signs and symptoms of infection  - Monitor lab/diagnostic results  - Monitor all insertion sites, i.e. indwelling lines, tubes, and drains  - Monitor endotracheal if appropriate and nasal secretions for changes in amount and color  - [place] appropriate cooling/warming therapies per order  - Administer medications as ordered  - Instruct and encourage patient and family to use good hand hygiene technique  - Identify and instruct in appropriate isolation precautions for identified infection/condition  Outcome: Progressing  Goal: Absence of fever/infection during neutropenic period  Description: INTERVENTIONS:  - Monitor WBC    Outcome: Progressing     Problem: SAFETY ADULT  Goal: Patient will remain free of falls  Description: INTERVENTIONS:  - Educate patient/family on patient safety including physical limitations  - Instruct patient to call for assistance with activity   - Consult OT/PT to assist with strengthening/mobility   - Keep Call bell within reach  - Keep bed low and locked with side rails adjusted as appropriate  - Keep care items and personal belongings within reach  - Initiate and maintain comfort rounds  - Make Fall Risk Sign visible to staff  - Offer Toileting every 2 Hours,  in advance of need  - Initiate/Maintain bed alarm  - Obtain necessary fall risk management equipment: bed alarm  - Apply yellow socks and bracelet for high fall risk patients  - Consider moving patient to room near nurses station  Outcome: Progressing  Goal: Maintain or return to baseline ADL function  Description: INTERVENTIONS:  -  Assess patient's ability to carry out ADLs; assess patient's baseline for ADL function and identify physical deficits which impact ability to perform ADLs (bathing, care of mouth/teeth, toileting, grooming, dressing, etc.)  - Assess/evaluate cause of self-care deficits   - Assess range of motion  - Assess patient's mobility; develop plan if impaired  - Assess patient's need for assistive devices and provide as appropriate  - Encourage maximum independence but intervene and supervise when necessary  - Involve family in performance of ADLs  - Assess for home care needs following discharge   - Consider OT consult to assist with ADL evaluation and planning for discharge  - Provide patient education as appropriate  Outcome: Progressing  Goal: Maintains/Returns to pre admission functional level  Description: INTERVENTIONS:  - Perform AM-PAC 6 Click Basic Mobility/ Daily Activity assessment daily.  - Set and communicate daily mobility goal to care team and patient/family/caregiver.   - Collaborate with rehabilitation services on mobility goals if consulted  - Perform Range of Motion 3 times a day.  - Reposition patient every 2 hours.  - Dangle patient 3 times a day  - Stand patient 3 times a day  - Ambulate patient 3 times a day  - Out of bed to chair 3 times a day   - Out of bed for meals 3 times a day  - Out of bed for toileting  - Record patient progress and toleration of activity level   Outcome: Progressing     Problem: DISCHARGE PLANNING  Goal: Discharge to home or other facility with appropriate resources  Description: INTERVENTIONS:  - Identify barriers to discharge w/patient and  caregiver  - Arrange for needed discharge resources and transportation as appropriate  - Identify discharge learning needs (meds, wound care, etc.)  - Arrange for interpretive services to assist at discharge as needed  - Refer to Case Management Department for coordinating discharge planning if the patient needs post-hospital services based on physician/advanced practitioner order or complex needs related to functional status, cognitive ability, or social support system  Outcome: Progressing     Problem: Knowledge Deficit  Goal: Patient/family/caregiver demonstrates understanding of disease process, treatment plan, medications, and discharge instructions  Description: Complete learning assessment and assess knowledge base.  Interventions:  - Provide teaching at level of understanding  - Provide teaching via preferred learning methods  Outcome: Progressing     Problem: Prexisting or High Potential for Compromised Skin Integrity  Goal: Skin integrity is maintained or improved  Description: INTERVENTIONS:  - Identify patients at risk for skin breakdown  - Assess and monitor skin integrity  - Assess and monitor nutrition and hydration status  - Monitor labs   - Assess for incontinence   - Turn and reposition patient  - Assist with mobility/ambulation  - Relieve pressure over bony prominences  - Avoid friction and shearing  - Provide appropriate hygiene as needed including keeping skin clean and dry  - Evaluate need for skin moisturizer/barrier cream  - Collaborate with interdisciplinary team   - Patient/family teaching  - Consider wound care consult   Outcome: Progressing

## 2024-03-09 NOTE — PROGRESS NOTES
[facility]  Progress Note  Name: Jairon Oconnell I  MRN: [id number]  Unit/Bed#: -01 I Date of Admission: 3/1/2024   Date of Service: 3/9/2024 I Hospital Day: 8    Assessment/Plan   * Ambulatory dysfunction  Assessment & Plan  Patient has nonoperative right hip greater trochanter fracture was seen and evaluated by orthopedic surgery on the previous admission  He was discharged to a skilled nursing facility agitated, broke a chair, and was reported to become aggressive with staff.  The facility will now not take him back.   Seen and evaluated by PT/OT medically cleared for discharge to skilled nursing facility  Psychiatry recommendations appreciated. No indication for inpatient psychiatric hospitalization at this time.  Recommendation is rehab/SNF  Case management following, input appreciated         Urinary retention  Assessment & Plan  A chapman catheter discontinued and started on tamsulosin (3/6)  Continue with urinary retention protocol         Pressure ulcer of sacral region, stage 3 (HCC)  Assessment & Plan  POA  Wound care recommendations appreciated: Scarring to the left lateral buttock from stage 3 pressure injury in the past. Scarring is intact and blanchable and hyperpigmented   Turn and reposition every 2 hours, frequent offloading        Behavior concern in adult  Assessment & Plan  He has been cooperative without any agitation or behavioral issues   (3/8) decreased Seroquel XR to 150 BID, and 300 HS due to excessive sleepiness       Acquired hypothyroidism  Assessment & Plan  Continue levothyroxine 150 mcg p.o. every morning        Essential hypertension  Assessment & Plan  Continue lisinopril 10 mg p.o. daily  Monitor blood pressure         Mixed hyperlipidemia  Assessment & Plan  Continue Pravachol 80 mg oral daily at bedtime        Generalized convulsive epilepsy (HCC)  Assessment & Plan  Continue Depakote DR 1000 mg p.o. nightly and 750 mg p.o. daily, Neurontin 400 mg  p.o. 3 times daily, Vimpat 150 mg p.o. daily and 200 mg p.o. nightly and Keppra 1500 mg p.o. every 12 hours  Continue seizure precautions        Type 2 diabetes mellitus with diabetic neuropathy, without long-term current use of insulin (HCC)  Assessment & Plan  Lab Results   Component Value Date    HGBA1C 5.5 11/27/2023       Recent Labs     03/08/24  1125 03/08/24  1610 03/08/24  2112 03/09/24  0729   POCGLU 153* 145* 141* 120       Blood Sugar Average: Last 72 hrs:  (P) [id number]    Blood sugar has been very well controlled   Continue diabetic diet     Continue fingerstick blood sugar with sliding scale coverage  Hypoglycemia protocol    Cerebral palsy (HCC)  Assessment & Plan  Continue supportive care   Continue with current medications                  VTE Pharmacologic Prophylaxis: VTE Score: 3 Moderate Risk (Score 3-4) - Pharmacological DVT Prophylaxis Ordered: enoxaparin (Lovenox).    Mobility:   Basic Mobility Inpatient Raw Score: 15  JH-HLM Goal: 4: Move to chair/commode  JH-HLM Achieved: 4: Move to chair/commode  HLM Goal NOT achieved. Continue with multidisciplinary rounding and encourage appropriate mobility to improve upon HLM goals.    Patient Centered Rounds: I performed bedside rounds with nursing staff today.   Discussions with Specialists or Other Care Team Provider: CM, OT/PT     Education and Discussions with Family / Patient:  patient was updated.     Total Time Spent on Date of Encounter in care of patient: 23 mins. This time was spent on one or more of the following: performing physical exam; counseling and coordination of care; obtaining or reviewing history; documenting in the medical record; reviewing/ordering tests, medications or procedures; communicating with other healthcare professionals and discussing with patient's family/caregivers.    Current Length of Stay: 8 day(s)  Current Patient Status: Inpatient   Certification Statement: The patient will continue to require  additional inpatient hospital stay due to ambulatory gait dysfunction  Discharge Plan: Anticipate discharge in 24-48 hrs to rehab facility.    Code Status: Level 1 - Full Code    Subjective:   The patient was seen and examined.  He offers no complaint at this time. No acute event overnight. Staff reports that he has been urinating well without any issue. More awake today after Seroquel dose adjustment.     Objective:     Vitals:   Temp (24hrs), Av.2 °F (36.2 °C), Min:96.8 °F (36 °C), Max:97.6 °F (36.4 °C)    Temp:  [96.8 °F (36 °C)-97.6 °F (36.4 °C)] 96.8 °F (36 °C)  HR:  [72-85] 85  Resp:  [18] 18  BP: (123-151)/(73-91) 151/91  SpO2:  [95 %-98 %] 95 %  Body mass index is 19.88 kg/m².     Input and Output Summary (last 24 hours):   No intake or output data in the 24 hours ending 24 1029      Physical Exam:   Physical Exam  Vitals and nursing note reviewed.   Constitutional:       General: He is not in acute distress.     Appearance: Normal appearance.      Comments: Frail and appearing older than age   HENT:      Head: Normocephalic and atraumatic.      Right Ear: External ear normal.      Left Ear: External ear normal.      Nose: Nose normal.      Mouth/Throat:      Mouth: Mucous membranes are moist.      Pharynx: Oropharynx is clear.   Eyes:      General:         Right eye: No discharge.         Left eye: No discharge.      Extraocular Movements: Extraocular movements intact.      Pupils: Pupils are equal, round, and reactive to light.   Cardiovascular:      Rate and Rhythm: Normal rate and regular rhythm.      Pulses: Normal pulses.      Heart sounds: Normal heart sounds. No murmur heard.  Pulmonary:      Effort: Pulmonary effort is normal. No respiratory distress.      Breath sounds: Normal breath sounds. No wheezing or rales.   Abdominal:      General: Bowel sounds are normal. There is no distension.      Palpations: Abdomen is soft. There is no mass.      Tenderness: There is no abdominal tenderness.    Musculoskeletal:         General: No swelling, tenderness or deformity. Normal range of motion.      Cervical back: Normal range of motion and neck supple. No rigidity.   Skin:     General: Skin is warm and dry.      Capillary Refill: Capillary refill takes less than 2 seconds.      Coloration: Skin is not pale.      Findings: No erythema.   Neurological:      Mental Status: He is alert. Mental status is at baseline.      Comments: With periods of forgetfulness/confusion. Upper extremities tremors and ataxia. At baseline.    Psychiatric:         Attention and Perception: Attention normal.         Mood and Affect: Affect is flat.         Cognition and Memory: Cognition is impaired.       Additional Data:     Labs:                  Results from last 7 days   Lab Units 03/09/24  0729 03/08/24  2112 03/08/24  1610 03/08/24  1125 03/08/24  0726 03/07/24  2136 03/07/24  1612 03/07/24  1108 03/07/24  0747 03/06/24  2031 03/06/24  1614 03/06/24  1115   POC GLUCOSE mg/dl 120 141* 145* 153* 105 127 122 157* 133 129 150* 118               Lines/Drains:  Invasive Devices       Drain  Duration             External Urinary Catheter Large <1 day                  Urinary Catheter:  Goal for removal: Voiding trial when ambulation improves               Imaging: Reviewed radiology reports from this admission including: CT head and xray(s)    Recent Cultures (last 7 days):         Last 24 Hours Medication List:   Current Facility-Administered Medications   Medication Dose Route Frequency Provider Last Rate    acetaminophen  650 mg Oral Q6H PRN Hasmukh Becker PA-C      divalproex sodium  1,000 mg Oral HS Hasmukh Becker PA-C      divalproex sodium  750 mg Oral Daily Hasmukh Becker PA-C      docusate sodium  100 mg Oral BID PRN Hasmukh Becker PA-C      enoxaparin  40 mg Subcutaneous Daily Hasmukh Beckre PA-C      gabapentin  400 mg Oral TID Hasmukh Becker PA-C      ibuprofen  600 mg Oral Q6H PRN Ancelmo Mota PA-C      insulin lispro   1-5 Units Subcutaneous TID AC Hasmukh Becker PA-C      insulin lispro  1-6 Units Subcutaneous HS Hasmukh Becker PA-C      lacosamide  150 mg Oral Daily Hasmukh Becker PA-C      lacosamide  200 mg Oral HS Hasmukh Becker PA-C      levETIRAcetam  1,500 mg Oral Q12H RONALD Hasmukh Becker PA-C      levothyroxine  150 mcg Oral Daily Hasmukh Becker PA-C      lisinopril  10 mg Oral Daily Hasmukh Becker PA-C      loratadine  10 mg Oral Daily Hasmukh Becker PA-C      multivitamin stress formula  1 tablet Oral Daily Hasmukh Becker PA-C      OLANZapine  5 mg Intramuscular Q3H PRN Hasmukh Becker PA-C      pravastatin  80 mg Oral Daily With Dinner Hasmukh Becker PA-C      QUEtiapine  150 mg Oral BID HUNTER Lopez      QUEtiapine  300 mg Oral HS HUNTER Lopez      tamsulosin  0.4 mg Oral Daily With Dinner HUNTER Lopez      temazepam  15 mg Oral HS PRN Hasmukh Becker PA-C      zonisamide  300 mg Oral HS Hasmukh Becker PA-C          Today, Patient Was Seen By: HUNTER Lopez    **Please Note: This note may have been constructed using a voice recognition system.**

## 2024-03-10 LAB
GLUCOSE SERPL-MCNC: 104 MG/DL (ref 65–140)
GLUCOSE SERPL-MCNC: 136 MG/DL (ref 65–140)
GLUCOSE SERPL-MCNC: 153 MG/DL (ref 65–140)
GLUCOSE SERPL-MCNC: 182 MG/DL (ref 65–140)

## 2024-03-10 PROCEDURE — 99232 SBSQ HOSP IP/OBS MODERATE 35: CPT | Performed by: NURSE PRACTITIONER

## 2024-03-10 PROCEDURE — 82948 REAGENT STRIP/BLOOD GLUCOSE: CPT

## 2024-03-10 RX ORDER — ACETAMINOPHEN 325 MG/1
650 TABLET ORAL 4 TIMES DAILY PRN
Status: DISCONTINUED | OUTPATIENT
Start: 2024-03-10 | End: 2024-04-01 | Stop reason: HOSPADM

## 2024-03-10 RX ORDER — ACETAMINOPHEN 325 MG/1
975 TABLET ORAL EVERY 12 HOURS
Status: DISPENSED | OUTPATIENT
Start: 2024-03-10 | End: 2024-03-15

## 2024-03-10 RX ADMIN — LEVETIRACETAM 1500 MG: 500 TABLET, FILM COATED ORAL at 08:32

## 2024-03-10 RX ADMIN — ZONISAMIDE 300 MG: 100 CAPSULE ORAL at 21:37

## 2024-03-10 RX ADMIN — GABAPENTIN 400 MG: 400 CAPSULE ORAL at 21:37

## 2024-03-10 RX ADMIN — PRAVASTATIN SODIUM 80 MG: 40 TABLET ORAL at 16:12

## 2024-03-10 RX ADMIN — B-COMPLEX W/ C & FOLIC ACID TAB 1 TABLET: TAB at 08:32

## 2024-03-10 RX ADMIN — INSULIN LISPRO 1 UNITS: 100 INJECTION, SOLUTION INTRAVENOUS; SUBCUTANEOUS at 16:15

## 2024-03-10 RX ADMIN — LACOSAMIDE 200 MG: 150 TABLET ORAL at 21:36

## 2024-03-10 RX ADMIN — DIVALPROEX SODIUM 750 MG: 250 TABLET, DELAYED RELEASE ORAL at 08:32

## 2024-03-10 RX ADMIN — TAMSULOSIN HYDROCHLORIDE 0.4 MG: 0.4 CAPSULE ORAL at 16:12

## 2024-03-10 RX ADMIN — QUETIAPINE FUMARATE 150 MG: 150 TABLET, EXTENDED RELEASE ORAL at 08:31

## 2024-03-10 RX ADMIN — GABAPENTIN 400 MG: 400 CAPSULE ORAL at 16:12

## 2024-03-10 RX ADMIN — QUETIAPINE FUMARATE 150 MG: 150 TABLET, EXTENDED RELEASE ORAL at 13:47

## 2024-03-10 RX ADMIN — LORATADINE 10 MG: 10 TABLET ORAL at 08:32

## 2024-03-10 RX ADMIN — ACETAMINOPHEN 975 MG: 325 TABLET ORAL at 21:37

## 2024-03-10 RX ADMIN — INSULIN LISPRO 1 UNITS: 100 INJECTION, SOLUTION INTRAVENOUS; SUBCUTANEOUS at 08:35

## 2024-03-10 RX ADMIN — GABAPENTIN 400 MG: 400 CAPSULE ORAL at 08:31

## 2024-03-10 RX ADMIN — LEVETIRACETAM 1500 MG: 500 TABLET, FILM COATED ORAL at 21:37

## 2024-03-10 RX ADMIN — DIVALPROEX SODIUM 1000 MG: 500 TABLET, DELAYED RELEASE ORAL at 21:37

## 2024-03-10 RX ADMIN — QUETIAPINE FUMARATE 300 MG: 150 TABLET, EXTENDED RELEASE ORAL at 21:37

## 2024-03-10 RX ADMIN — ACETAMINOPHEN 975 MG: 325 TABLET ORAL at 08:31

## 2024-03-10 RX ADMIN — LACOSAMIDE 150 MG: 150 TABLET ORAL at 08:31

## 2024-03-10 RX ADMIN — LEVOTHYROXINE SODIUM 150 MCG: 75 TABLET ORAL at 08:31

## 2024-03-10 RX ADMIN — LISINOPRIL 10 MG: 10 TABLET ORAL at 08:32

## 2024-03-10 RX ADMIN — ENOXAPARIN SODIUM 40 MG: 40 INJECTION SUBCUTANEOUS at 08:31

## 2024-03-10 NOTE — PLAN OF CARE
Problem: PAIN - ADULT  Goal: Verbalizes/displays adequate comfort level or baseline comfort level  Description: Interventions:  - Encourage patient to monitor pain and request assistance  - Assess pain using appropriate pain scale  - Administer analgesics based on type and severity of pain and evaluate response  - Implement non-pharmacological measures as appropriate and evaluate response  - Consider cultural and social influences on pain and pain management  - Notify physician/advanced practitioner if interventions unsuccessful or patient reports new pain  Outcome: Progressing     Problem: INFECTION - ADULT  Goal: Absence or prevention of progression during hospitalization  Description: INTERVENTIONS:  - Assess and monitor for signs and symptoms of infection  - Monitor lab/diagnostic results  - Monitor all insertion sites, i.e. indwelling lines, tubes, and drains  - Monitor endotracheal if appropriate and nasal secretions for changes in amount and color  - [place] appropriate cooling/warming therapies per order  - Administer medications as ordered  - Instruct and encourage patient and family to use good hand hygiene technique  - Identify and instruct in appropriate isolation precautions for identified infection/condition  Outcome: Progressing

## 2024-03-10 NOTE — PROGRESS NOTES
[facility]  Progress Note  Name: Jairon Oconnell I  MRN: [id number]  Unit/Bed#: -01 I Date of Admission: 3/1/2024   Date of Service: 3/10/2024 I Hospital Day: 9    Assessment/Plan   * Ambulatory dysfunction  Assessment & Plan  Patient has nonoperative right hip greater trochanter fracture was seen and evaluated by orthopedic surgery on the previous admission  He was discharged to a skilled nursing facility agitated, broke a chair, and was reported to become aggressive with staff.  The facility will now not take him back.   Seen and evaluated by PT/OT medically cleared for discharge to skilled nursing facility  Psychiatry recommendations appreciated. No indication for inpatient psychiatric hospitalization at this time.  Recommendation is rehab/SNF  Case management following, input appreciated          Urinary retention  Assessment & Plan  A chapman catheter discontinued and started on tamsulosin (3/6)  Continue with urinary retention protocol          Pressure ulcer of sacral region, stage 3 (HCC)  Assessment & Plan  POA  Wound care recommendations appreciated: Scarring to the left lateral buttock from stage 3 pressure injury in the past. Scarring is intact and blanchable and hyperpigmented   Turn and reposition every 2 hours, frequent offloading         Behavior concern in adult  Assessment & Plan  He has been cooperative without any agitation or behavioral issues   (3/8) decreased Seroquel XR to 150 BID, and 300 HS due to excessive sleepiness        Acquired hypothyroidism  Assessment & Plan  Continue levothyroxine 150 mcg p.o. every morning         Essential hypertension  Assessment & Plan  Continue lisinopril 10 mg p.o. daily  Monitor blood pressure          Mixed hyperlipidemia  Assessment & Plan  Continue Pravachol 80 mg oral daily at bedtime          Generalized convulsive epilepsy (HCC)  Assessment & Plan  Continue Depakote DR 1000 mg p.o. nightly and 750 mg p.o. daily, Neurontin  400 mg p.o. 3 times daily, Vimpat 150 mg p.o. daily and 200 mg p.o. nightly, zonisamide 300 mg HS, and Keppra 1500 mg p.o. every 12 hours  Continue seizure precautions         Type 2 diabetes mellitus with diabetic neuropathy, without long-term current use of insulin (HCC)  Assessment & Plan  Lab Results   Component Value Date    HGBA1C 5.5 11/27/2023       Recent Labs     03/09/24  1056 03/09/24  1557 03/09/24  2029 03/10/24  0631   POCGLU 319* 108 196* 153*       Blood Sugar Average: Last 72 hrs:  (P) [id number]    Blood sugar has been very well controlled   Continue diabetic diet      Continue fingerstick blood sugar with sliding scale coverage  Hypoglycemia protocol    Ataxic cerebral palsy (HCC)  Assessment & Plan  Continue supportive care   Continue with current medications                   VTE Pharmacologic Prophylaxis: VTE Score: 3 Moderate Risk (Score 3-4) - Pharmacological DVT Prophylaxis Ordered: enoxaparin (Lovenox).    Mobility:   Basic Mobility Inpatient Raw Score: 15  JH-HLM Goal: 4: Move to chair/commode  JH-HLM Achieved: 4: Move to chair/commode  HLM Goal NOT achieved. Continue with multidisciplinary rounding and encourage appropriate mobility to improve upon HLM goals.    Patient Centered Rounds: I performed bedside rounds with nursing staff today.   Discussions with Specialists or Other Care Team Provider: CM, OT/PT     Education and Discussions with Family / Patient:  patient was updated.     Total Time Spent on Date of Encounter in care of patient: 22 mins. This time was spent on one or more of the following: performing physical exam; counseling and coordination of care; obtaining or reviewing history; documenting in the medical record; reviewing/ordering tests, medications or procedures; communicating with other healthcare professionals and discussing with patient's family/caregivers.    Current Length of Stay: 9 day(s)  Current Patient Status: Inpatient   Certification Statement:  The patient will continue to require additional inpatient hospital stay due to ambulatory gait dysfunction  Discharge Plan: Anticipate discharge in 24-48 hrs to rehab facility.    Code Status: Level 1 - Full Code    Subjective:   The patient was seen and examined.  He offers no complaint at this time. Staff reports that he has been requesting more food/feeling hungry throughout the day. Diet is changed to DM/double portion/double protein.     Objective:     Vitals:   Temp (24hrs), Av.4 °F (36.3 °C), Min:96.6 °F (35.9 °C), Max:97.9 °F (36.6 °C)    Temp:  [96.6 °F (35.9 °C)-97.9 °F (36.6 °C)] 96.6 °F (35.9 °C)  HR:  [68-92] 68  Resp:  [18] 18  BP: (116-139)/(76-81) 139/81  SpO2:  [95 %-97 %] 97 %  Body mass index is 19.88 kg/m².     Input and Output Summary (last 24 hours):     Intake/Output Summary (Last 24 hours) at 3/10/2024 1000  Last data filed at 3/10/2024 0900  Gross per 24 hour   Intake --   Output 800 ml   Net -800 ml         Physical Exam:   Physical Exam  Vitals and nursing note reviewed.   Constitutional:       General: He is not in acute distress.     Appearance: Normal appearance.      Comments: Frail and appearing older than age   HENT:      Head: Normocephalic and atraumatic.      Right Ear: External ear normal.      Left Ear: External ear normal.      Nose: Nose normal.      Mouth/Throat:      Mouth: Mucous membranes are moist.      Pharynx: Oropharynx is clear.   Eyes:      General:         Right eye: No discharge.         Left eye: No discharge.      Extraocular Movements: Extraocular movements intact.      Pupils: Pupils are equal, round, and reactive to light.   Cardiovascular:      Rate and Rhythm: Normal rate and regular rhythm.      Pulses: Normal pulses.      Heart sounds: Normal heart sounds. No murmur heard.  Pulmonary:      Effort: Pulmonary effort is normal. No respiratory distress.      Breath sounds: Normal breath sounds. No wheezing or rales.   Abdominal:      General: Bowel sounds  are normal. There is no distension.      Palpations: Abdomen is soft. There is no mass.      Tenderness: There is no abdominal tenderness.   Musculoskeletal:         General: No swelling, tenderness or deformity. Normal range of motion.      Cervical back: Normal range of motion and neck supple. No rigidity.   Skin:     General: Skin is warm and dry.      Capillary Refill: Capillary refill takes less than 2 seconds.      Coloration: Skin is not pale.      Findings: No erythema.   Neurological:      Mental Status: He is alert. Mental status is at baseline.      Comments: With periods of forgetfulness/confusion. Upper extremities tremors and ataxia. At baseline.    Psychiatric:         Attention and Perception: Attention normal.         Mood and Affect: Affect is flat.         Cognition and Memory: Cognition is impaired.       Additional Data:     Labs:                  Results from last 7 days   Lab Units 03/10/24  0631 03/09/24  2029 03/09/24  1557 03/09/24  1056 03/09/24  0729 03/08/24  2112 03/08/24  1610 03/08/24  1125 03/08/24  0726 03/07/24  2136 03/07/24  1612 03/07/24  1108   POC GLUCOSE mg/dl 153* 196* 108 319* 120 141* 145* 153* 105 127 122 157*               Lines/Drains:  Invasive Devices       None                 Urinary Catheter:  Goal for removal: Voiding trial when ambulation improves               Imaging: Reviewed radiology reports from this admission including: CT head and xray(s)    Recent Cultures (last 7 days):         Last 24 Hours Medication List:   Current Facility-Administered Medications   Medication Dose Route Frequency Provider Last Rate    acetaminophen  650 mg Oral 4x Daily PRN HUNTER Lopez      acetaminophen  975 mg Oral Q12H HUNTER Lopez      divalproex sodium  1,000 mg Oral HS Hasmukh Becker PA-C      divalproex sodium  750 mg Oral Daily Hasmukh Becker PA-C      docusate sodium  100 mg Oral BID PRN Hasmukh Becker PA-C      enoxaparin  40 mg Subcutaneous Daily Hasmukh  JAIR Becker      gabapentin  400 mg Oral TID Hasmukh Becker PA-C      ibuprofen  600 mg Oral Q6H PRN Ancelmo Mota PA-C      insulin lispro  1-5 Units Subcutaneous TID AC Hasmukh Becker, JAIR      insulin lispro  1-6 Units Subcutaneous HS Hasmukh Becker PA-C      lacosamide  150 mg Oral Daily Hasmukh Becker PA-C      lacosamide  200 mg Oral HS Hasmukh Becker PA-C      levETIRAcetam  1,500 mg Oral Q12H RONALD Hasmukh Becker PA-C      levothyroxine  150 mcg Oral Daily Hasmukh Becker PA-C      lisinopril  10 mg Oral Daily Hasmukh Becker PA-C      loratadine  10 mg Oral Daily Hasmukh Becker PA-C      multivitamin stress formula  1 tablet Oral Daily Hasmukh Becker PA-C      OLANZapine  5 mg Intramuscular Q3H PRN Hasmukh Becker PA-C      pravastatin  80 mg Oral Daily With Dinner Hasmukh Becker PA-C      QUEtiapine  150 mg Oral BID HUNTER Lopez      QUEtiapine  300 mg Oral HS HUNTER Lopez      tamsulosin  0.4 mg Oral Daily With Dinner HUNTER Lopez      temazepam  15 mg Oral HS PRN Hasmukh Becker PA-C      zonisamide  300 mg Oral HS Hasmukh Becker PA-C          Today, Patient Was Seen By: HUNTER Lopez    **Please Note: This note may have been constructed using a voice recognition system.**

## 2024-03-11 LAB
ANION GAP SERPL CALCULATED.3IONS-SCNC: 10 MMOL/L
BUN SERPL-MCNC: 34 MG/DL (ref 5–25)
CALCIUM SERPL-MCNC: 9.7 MG/DL (ref 8.4–10.2)
CHLORIDE SERPL-SCNC: 104 MMOL/L (ref 96–108)
CO2 SERPL-SCNC: 25 MMOL/L (ref 21–32)
CREAT SERPL-MCNC: 0.72 MG/DL (ref 0.6–1.3)
ERYTHROCYTE [DISTWIDTH] IN BLOOD BY AUTOMATED COUNT: 13.8 % (ref 11.6–15.1)
GFR SERPL CREATININE-BSD FRML MDRD: 104 ML/MIN/1.73SQ M
GLUCOSE SERPL-MCNC: 108 MG/DL (ref 65–140)
GLUCOSE SERPL-MCNC: 131 MG/DL (ref 65–140)
GLUCOSE SERPL-MCNC: 207 MG/DL (ref 65–140)
GLUCOSE SERPL-MCNC: 95 MG/DL (ref 65–140)
HCT VFR BLD AUTO: 40.1 % (ref 36.5–49.3)
HGB BLD-MCNC: 13 G/DL (ref 12–17)
MCH RBC QN AUTO: 31.9 PG (ref 26.8–34.3)
MCHC RBC AUTO-ENTMCNC: 32.4 G/DL (ref 31.4–37.4)
MCV RBC AUTO: 98 FL (ref 82–98)
PLATELET # BLD AUTO: 139 THOUSANDS/UL (ref 149–390)
PMV BLD AUTO: 11 FL (ref 8.9–12.7)
POTASSIUM SERPL-SCNC: 4.1 MMOL/L (ref 3.5–5.3)
RBC # BLD AUTO: 4.08 MILLION/UL (ref 3.88–5.62)
SODIUM SERPL-SCNC: 139 MMOL/L (ref 135–147)
WBC # BLD AUTO: 7.35 THOUSAND/UL (ref 4.31–10.16)

## 2024-03-11 PROCEDURE — 85027 COMPLETE CBC AUTOMATED: CPT | Performed by: NURSE PRACTITIONER

## 2024-03-11 PROCEDURE — 80048 BASIC METABOLIC PNL TOTAL CA: CPT | Performed by: NURSE PRACTITIONER

## 2024-03-11 PROCEDURE — 82948 REAGENT STRIP/BLOOD GLUCOSE: CPT

## 2024-03-11 PROCEDURE — 99232 SBSQ HOSP IP/OBS MODERATE 35: CPT | Performed by: NURSE PRACTITIONER

## 2024-03-11 PROCEDURE — 97530 THERAPEUTIC ACTIVITIES: CPT

## 2024-03-11 RX ADMIN — TAMSULOSIN HYDROCHLORIDE 0.4 MG: 0.4 CAPSULE ORAL at 16:21

## 2024-03-11 RX ADMIN — LACOSAMIDE 150 MG: 150 TABLET ORAL at 08:19

## 2024-03-11 RX ADMIN — INSULIN LISPRO 2 UNITS: 100 INJECTION, SOLUTION INTRAVENOUS; SUBCUTANEOUS at 21:19

## 2024-03-11 RX ADMIN — QUETIAPINE FUMARATE 300 MG: 150 TABLET, EXTENDED RELEASE ORAL at 21:06

## 2024-03-11 RX ADMIN — LISINOPRIL 10 MG: 10 TABLET ORAL at 08:19

## 2024-03-11 RX ADMIN — LACOSAMIDE 200 MG: 150 TABLET ORAL at 21:18

## 2024-03-11 RX ADMIN — DIVALPROEX SODIUM 1000 MG: 500 TABLET, DELAYED RELEASE ORAL at 21:06

## 2024-03-11 RX ADMIN — LEVETIRACETAM 1500 MG: 500 TABLET, FILM COATED ORAL at 08:19

## 2024-03-11 RX ADMIN — ZONISAMIDE 300 MG: 100 CAPSULE ORAL at 21:06

## 2024-03-11 RX ADMIN — GABAPENTIN 400 MG: 400 CAPSULE ORAL at 20:04

## 2024-03-11 RX ADMIN — LEVOTHYROXINE SODIUM 150 MCG: 75 TABLET ORAL at 08:19

## 2024-03-11 RX ADMIN — ENOXAPARIN SODIUM 40 MG: 40 INJECTION SUBCUTANEOUS at 08:18

## 2024-03-11 RX ADMIN — QUETIAPINE FUMARATE 150 MG: 150 TABLET, EXTENDED RELEASE ORAL at 16:21

## 2024-03-11 RX ADMIN — QUETIAPINE FUMARATE 150 MG: 150 TABLET, EXTENDED RELEASE ORAL at 08:19

## 2024-03-11 RX ADMIN — ACETAMINOPHEN 975 MG: 325 TABLET ORAL at 08:19

## 2024-03-11 RX ADMIN — B-COMPLEX W/ C & FOLIC ACID TAB 1 TABLET: TAB at 08:19

## 2024-03-11 RX ADMIN — LORATADINE 10 MG: 10 TABLET ORAL at 08:19

## 2024-03-11 RX ADMIN — PRAVASTATIN SODIUM 80 MG: 40 TABLET ORAL at 16:22

## 2024-03-11 RX ADMIN — DIVALPROEX SODIUM 750 MG: 250 TABLET, DELAYED RELEASE ORAL at 08:18

## 2024-03-11 RX ADMIN — GABAPENTIN 400 MG: 400 CAPSULE ORAL at 16:21

## 2024-03-11 RX ADMIN — ACETAMINOPHEN 975 MG: 325 TABLET ORAL at 20:04

## 2024-03-11 RX ADMIN — LEVETIRACETAM 1500 MG: 500 TABLET, FILM COATED ORAL at 20:04

## 2024-03-11 RX ADMIN — GABAPENTIN 400 MG: 400 CAPSULE ORAL at 08:19

## 2024-03-11 NOTE — PROGRESS NOTES
[facility]  Progress Note  Name: Jairon Oconnell I  MRN: [id number]  Unit/Bed#: -01 I Date of Admission: 3/1/2024   Date of Service: 3/11/2024 I Hospital Day: 10    Assessment/Plan     * Ambulatory dysfunction  Assessment & Plan  Patient has nonoperative right hip greater trochanter fracture was seen and evaluated by orthopedic surgery on the previous admission  He was discharged to a skilled nursing facility agitated, broke a chair, and was reported to become aggressive with staff.  The facility will now not take him back.   Seen and evaluated by PT/OT medically cleared for discharge to skilled nursing facility  Psychiatry recommendations appreciated. No indication for inpatient psychiatric hospitalization at this time.  Recommendation is rehab/SNF  Case management following, input appreciated          Generalized convulsive epilepsy (HCC)  Assessment & Plan  Continue Depakote DR 1000 mg p.o. nightly and 750 mg p.o. daily, Neurontin 400 mg p.o. 3 times daily, Vimpat 150 mg p.o. daily and 200 mg p.o. nightly, zonisamide 300 mg HS, and Keppra 1500 mg p.o. every 12 hours  Continue seizure precautions         Type 2 diabetes mellitus with diabetic neuropathy, without long-term current use of insulin (HCC)  Assessment & Plan  Lab Results   Component Value Date    HGBA1C 5.5 11/27/2023       Recent Labs     03/09/24  1056 03/09/24  1557 03/09/24  2029 03/10/24  0631   POCGLU 319* 108 196* 153*       Blood Sugar Average: Last 72 hrs:  (P) 152.4731000598190026    Blood sugar has been very well controlled   Continue diabetic diet      Continue fingerstick blood sugar with sliding scale coverage  Hypoglycemia protocol    Urinary retention  Assessment & Plan  A chapman catheter discontinued and started on tamsulosin (3/6)  Continue with urinary retention protocol          Pressure ulcer of sacral region, stage 3 (HCC)  Assessment & Plan  POA  Wound care recommendations appreciated: Scarring to the  left lateral buttock from stage 3 pressure injury in the past. Scarring is intact and blanchable and hyperpigmented   Turn and reposition every 2 hours, frequent offloading         Behavior concern in adult  Assessment & Plan  He has been cooperative without any agitation or behavioral issues   (3/8) decreased Seroquel XR to 150 BID, and 300 HS due to excessive sleepiness        Acquired hypothyroidism  Assessment & Plan  Continue levothyroxine 150 mcg p.o. every morning         Essential hypertension  Assessment & Plan  Continue lisinopril 10 mg p.o. daily  Monitor blood pressure          Mixed hyperlipidemia  Assessment & Plan  Continue Pravachol 80 mg oral daily at bedtime          Ataxic cerebral palsy (HCC)  Assessment & Plan  Continue supportive care   Continue with current medications               VTE Pharmacologic Prophylaxis: VTE Score: 3 Moderate Risk (Score 3-4) - Pharmacological DVT Prophylaxis Ordered: enoxaparin (Lovenox).    Mobility:   Basic Mobility Inpatient Raw Score: 17  JH-HLM Goal: 5: Stand one or more mins  JH-HLM Achieved: 2: Bed activities/Dependent transfer  HLM Goal NOT achieved. Continue with multidisciplinary rounding and encourage appropriate mobility to improve upon HLM goals.    Patient Centered Rounds: I performed bedside rounds with nursing staff today.   Discussions with Specialists or Other Care Team Provider: CM    Education and Discussions with Family / Patient: Updated  (sister) at bedside.    Total Time Spent on Date of Encounter in care of patient: 31 mins. This time was spent on one or more of the following: performing physical exam; counseling and coordination of care; obtaining or reviewing history; documenting in the medical record; reviewing/ordering tests, medications or procedures; communicating with other healthcare professionals and discussing with patient's family/caregivers.    Current Length of Stay: 10 day(s)  Current Patient Status: Inpatient    Certification Statement: The patient will continue to require additional inpatient hospital stay due to care coordination.  Discharge Plan: Anticipate discharge later today or tomorrow to rehab facility.    Code Status: Level 1 - Full Code    Subjective:   Patient seen and examined.  No complaints offered    Objective:     Vitals:   Temp (24hrs), Av.7 °F (36.5 °C), Min:97.3 °F (36.3 °C), Max:98 °F (36.7 °C)    Temp:  [97.3 °F (36.3 °C)-98 °F (36.7 °C)] 97.7 °F (36.5 °C)  HR:  [66-80] 66  Resp:  [18] 18  BP: (117-139)/(64-69) 117/64  SpO2:  [95 %-100 %] 98 %  Body mass index is 19.88 kg/m².     Input and Output Summary (last 24 hours):     Intake/Output Summary (Last 24 hours) at 3/11/2024 0854  Last data filed at 3/11/2024 0504  Gross per 24 hour   Intake 720 ml   Output 1500 ml   Net -780 ml       Physical Exam:   Physical Exam  Vitals and nursing note reviewed.   Constitutional:       General: He is not in acute distress.  HENT:      Head: Normocephalic and atraumatic.      Nose: Nose normal.      Mouth/Throat:      Mouth: Mucous membranes are moist.      Pharynx: Oropharynx is clear.   Eyes:      Pupils: Pupils are equal, round, and reactive to light.   Cardiovascular:      Rate and Rhythm: Normal rate and regular rhythm.      Pulses: Normal pulses.   Pulmonary:      Effort: Pulmonary effort is normal. No respiratory distress.      Breath sounds: Normal breath sounds.   Abdominal:      General: Bowel sounds are normal.      Palpations: Abdomen is soft.      Tenderness: There is no abdominal tenderness.   Musculoskeletal:      Cervical back: Neck supple.      Right lower leg: No edema.      Left lower leg: No edema.   Skin:     General: Skin is warm and dry.      Capillary Refill: Capillary refill takes less than 2 seconds.   Neurological:      General: No focal deficit present.      Mental Status: He is alert. Mental status is at baseline.          Additional Data:     Labs:  Results from last 7 days   Lab  Units 03/11/24  0519   WBC Thousand/uL 7.35   HEMOGLOBIN g/dL 13.0   HEMATOCRIT % 40.1   PLATELETS Thousands/uL 139*     Results from last 7 days   Lab Units 03/11/24  0519   SODIUM mmol/L 139   POTASSIUM mmol/L 4.1   CHLORIDE mmol/L 104   CO2 mmol/L 25   BUN mg/dL 34*   CREATININE mg/dL 0.72   ANION GAP mmol/L 10   CALCIUM mg/dL 9.7   GLUCOSE RANDOM mg/dL 108         Results from last 7 days   Lab Units 03/10/24  2050 03/10/24  1604 03/10/24  1124 03/10/24  0631 03/09/24  2029 03/09/24  1557 03/09/24  1056 03/09/24  0729 03/08/24  2112 03/08/24  1610 03/08/24  1125 03/08/24  0726   POC GLUCOSE mg/dl 136 182* 104 153* 196* 108 319* 120 141* 145* 153* 105               Lines/Drains:  Invasive Devices       None                           Last 24 Hours Medication List:   Current Facility-Administered Medications   Medication Dose Route Frequency Provider Last Rate    acetaminophen  650 mg Oral 4x Daily PRN HUNTER Lopez      acetaminophen  975 mg Oral Q12H HUNTER Lopez      divalproex sodium  1,000 mg Oral HS Hasmukh Becker PA-C      divalproex sodium  750 mg Oral Daily Hasmukh Becker PA-C      docusate sodium  100 mg Oral BID PRN Hasmukh Becker PA-C      enoxaparin  40 mg Subcutaneous Daily Hasmukh Becker PA-C      gabapentin  400 mg Oral TID Hasmukh Becker PA-C      ibuprofen  600 mg Oral Q6H PRN Ancelmo Mota PA-C      insulin lispro  1-5 Units Subcutaneous TID AC Hasmukh Becker PA-C      insulin lispro  1-6 Units Subcutaneous HS Hasmukh Becker PA-C      lacosamide  150 mg Oral Daily Hasmukh Becker PA-C      lacosamide  200 mg Oral HS Hasmukh Becker PA-C      levETIRAcetam  1,500 mg Oral Q12H [facility] Hasmukh Becker PA-C      levothyroxine  150 mcg Oral Daily Hasmukh Becker PA-C      lisinopril  10 mg Oral Daily Hasmukh Becker, PA-C      loratadine  10 mg Oral Daily Hasmukh Becker PA-C      multivitamin stress formula  1 tablet Oral Daily Hasmukh Becker PA-C      OLANZapine  5 mg Intramuscular Q3H PRN Hasmukh  JAIR Becker      pravastatin  80 mg Oral Daily With Dinner Hasmukh Becker PA-C      QUEtiapine  150 mg Oral BID HUNTER Lopez      QUEtiapine  300 mg Oral HS HUNTER Lopez      tamsulosin  0.4 mg Oral Daily With Dinner HUNTER Lopez      temazepam  15 mg Oral HS PRN Hasmukh Becker PA-C      zonisamide  300 mg Oral HS Hasmukh Becker PA-C          Today, Patient Was Seen By: HUNTER Main    **Please Note: This note may have been constructed using a voice recognition system.**

## 2024-03-11 NOTE — PHYSICAL THERAPY NOTE
"Physical Therapy Treatment Note       03/11/24 1133   PT Last Visit   PT Visit Date 03/11/24   Note Type   Note Type Treatment   Pain Assessment   Pain Assessment Tool FLACC   Pain Rating: FLACC (Rest) - Face 0   Pain Rating: FLACC (Rest) - Legs 0   Pain Rating: FLACC (Rest) - Activity 0   Pain Rating: FLACC (Rest) - Cry 0   Pain Rating: FLACC (Rest) - Consolability 0   Score: FLACC (Rest) 0   Pain Rating: FLACC (Activity) - Face 0   Pain Rating: FLACC (Activity) - Legs 0   Pain Rating: FLACC (Activity) - Activity 0   Pain Rating: FLACC (Activity) - Cry 0   Pain Rating: FLACC (Activity) - Consolability 0   Score: FLACC (Activity) 0   Restrictions/Precautions   Weight Bearing Precautions Per Order Yes   RLE Weight Bearing Per Order WBAT   Other Precautions Cognitive;Chair Alarm;Bed Alarm;Fall Risk   General   Chart Reviewed Yes   Response to Previous Treatment Patient unable to report, no changes reported from family or staff   Family/Caregiver Present Yes  (pt's sister)   Cognition   Overall Cognitive Status Impaired   Arousal/Participation Lethargic;Responsive  (pt easily falling asleep, per nsg pt did not sleep much last night)   Attention Difficulty attending to directions   Orientation Level Oriented to person   Memory Unable to assess   Following Commands Follows one step commands with increased time or repetition   Subjective   Subjective \"I can\" - when asked if he would like to trial getting OOB to recliner   Bed Mobility   Supine to Sit 5  Supervision   Additional items Assist x 1;HOB elevated;Increased time required;Verbal cues  (cues for performance)   Transfers   Sit to Stand 4  Minimal assistance   Additional items Assist x 2;Increased time required;Verbal cues   Stand to Sit 4  Minimal assistance   Additional items Assist x 2;Increased time required;Verbal cues  (crouched stance)   Stand pivot 4  Minimal assistance   Additional items Assist x 2;Increased time required  (crouched stance, HHA) "   Ambulation/Elevation   Gait pattern Not tested  (PT deferred d/t pt with +lethargy)   Gait Assistance Not tested   Balance   Static Sitting Fair   Dynamic Sitting Fair -   Static Standing Fair -   Dynamic Standing Poor +   Endurance Deficit   Endurance Deficit Yes   Activity Tolerance   Activity Tolerance Patient limited by fatigue;Patient limited by pain   Nurse Made Aware Yes, RN made aware of outcomes/recs   Exercises   Neuro re-ed PT attempted ther ex, however pt with increased fatigue and lethargy, did not follow prompts for completion of Bud   Assessment   Prognosis Fair   Problem List Decreased strength;Decreased endurance;Decreased mobility;Decreased coordination;Decreased cognition;Impaired judgement;Decreased safety awareness;Pain;Orthopedic restrictions   Assessment Pt seen for PT treatment session this date, consisting of ther act focused on bed mobility and functional transfer training from bed>recliner . Since previous session, pt has made minimal progress in terms of improved OOB mobility progression, further ambulation attempt unable to be performed this session d/t pt with lethargy and decreased engagement throughout session and easily falling asleep. Current goals and POC established on IE remain appropriate, pt continues to have rehab potential and is making fair progress towards STGs. Pt prognosis for achieving goals is fair, pending pt progress with hospitalization/medical status improvements, and indicated by supportive family/caregivers and previous response to intervention. Pt continues to be functioning below baseline level, and remains limited 2* factors listed above. PT will continue to see pt during current hospitalization in order to address the deficits listed above and provide interventions consistent w/ POC in effort to achieve STGs. PT recommends level 2, moderate resource intensity upon discharge.   Goals   Patient Goals none expressed by pt re: therapy outcomes   LTG Expiration  Date 03/21/24   Long Term Goal #1 goals remain appropriate   Plan   Treatment/Interventions Functional transfer training;LE strengthening/ROM;Therapeutic exercise;Endurance training;Cognitive reorientation;Patient/family training;Equipment eval/education;Bed mobility;Gait training;Spoke to nursing   Progress Slow progress, decreased activity tolerance   PT Frequency 3-5x/wk   Discharge Recommendation   Rehab Resource Intensity Level, PT II (Moderate Resource Intensity)   Equipment Recommended Walker   Walker Package Recommended Wheeled walker   Additional Comments Pt's raw score on the AM-PAC Basic Mobility inpatient short form is 14, standardized score is 35.55. Patients at this level are likely to benefit from DC to post acute rehabilitation services, however, please refer to therapist recommendation for safe DC planning.   AM-PAC Basic Mobility Inpatient   Turning in Flat Bed Without Bedrails 4   Lying on Back to Sitting on Edge of Flat Bed Without Bedrails 3   Moving Bed to Chair 2   Standing Up From Chair Using Arms 2   Walk in Room 2   Climb 3-5 Stairs With Railing 1   Basic Mobility Inpatient Raw Score 14   Basic Mobility Standardized Score 35.55   Highest Level Of Mobility   JH-HLM Goal 4: Move to chair/commode   JH-HLM Achieved 4: Move to chair/commode   Education   Education Provided Mobility training   Patient Reinforcement needed   End of Consult   Patient Position at End of Consult Bedside chair;Bed/Chair alarm activated;All needs within reach  (pt's sister at bedside visiting)       Annabel Dorado PT, DPT    Time of PT treatment session: 2388-0938 (total treatment time = 24 minutes)

## 2024-03-11 NOTE — PLAN OF CARE
Problem: PAIN - ADULT  Goal: Verbalizes/displays adequate comfort level or baseline comfort level  Description: Interventions:  - Encourage patient to monitor pain and request assistance  - Assess pain using appropriate pain scale  - Administer analgesics based on type and severity of pain and evaluate response  - Implement non-pharmacological measures as appropriate and evaluate response  - Consider cultural and social influences on pain and pain management  - Notify physician/advanced practitioner if interventions unsuccessful or patient reports new pain  Outcome: Progressing     Problem: INFECTION - ADULT  Goal: Absence or prevention of progression during hospitalization  Description: INTERVENTIONS:  - Assess and monitor for signs and symptoms of infection  - Monitor lab/diagnostic results  - Monitor all insertion sites, i.e. indwelling lines, tubes, and drains  - Monitor endotracheal if appropriate and nasal secretions for changes in amount and color  - [place] appropriate cooling/warming therapies per order  - Administer medications as ordered  - Instruct and encourage patient and family to use good hand hygiene technique  - Identify and instruct in appropriate isolation precautions for identified infection/condition  Outcome: Progressing  Goal: Absence of fever/infection during neutropenic period  Description: INTERVENTIONS:  - Monitor WBC    Outcome: Progressing     Problem: SAFETY ADULT  Goal: Patient will remain free of falls  Description: INTERVENTIONS:  - Educate patient/family on patient safety including physical limitations  - Instruct patient to call for assistance with activity   - Consult OT/PT to assist with strengthening/mobility   - Keep Call bell within reach  - Keep bed low and locked with side rails adjusted as appropriate  - Keep care items and personal belongings within reach  - Initiate and maintain comfort rounds  - Make Fall Risk Sign visible to staff  - Offer Toileting every 2 Hours, in  advance of need  - Initiate/Maintain bed alarm  - Obtain necessary fall risk management equipment  - Apply yellow socks and bracelet for high fall risk patients  - Consider moving patient to room near nurses station  Outcome: Progressing  Goal: Maintain or return to baseline ADL function  Description: INTERVENTIONS:  -  Assess patient's ability to carry out ADLs; assess patient's baseline for ADL function and identify physical deficits which impact ability to perform ADLs (bathing, care of mouth/teeth, toileting, grooming, dressing, etc.)  - Assess/evaluate cause of self-care deficits   - Assess range of motion  - Assess patient's mobility; develop plan if impaired  - Assess patient's need for assistive devices and provide as appropriate  - Encourage maximum independence but intervene and supervise when necessary  - Involve family in performance of ADLs  - Assess for home care needs following discharge   - Consider OT consult to assist with ADL evaluation and planning for discharge  - Provide patient education as appropriate  Outcome: Progressing  Goal: Maintains/Returns to pre admission functional level  Description: INTERVENTIONS:  - Perform AM-PAC 6 Click Basic Mobility/ Daily Activity assessment daily.  - Set and communicate daily mobility goal to care team and patient/family/caregiver.   - Collaborate with rehabilitation services on mobility goals if consulted  - Perform Range of Motion 3 times a day.  - Reposition patient every 2 hours.  - Dangle patient 3 times a day  - Stand patient 3 times a day  - Ambulate patient 3 times a day  - Out of bed to chair 3 times a day   - Out of bed for meals 3 times a day  - Out of bed for toileting  - Record patient progress and toleration of activity level   Outcome: Progressing     Problem: DISCHARGE PLANNING  Goal: Discharge to home or other facility with appropriate resources  Description: INTERVENTIONS:  - Identify barriers to discharge w/patient and caregiver  -  Arrange for needed discharge resources and transportation as appropriate  - Identify discharge learning needs (meds, wound care, etc.)  - Arrange for interpretive services to assist at discharge as needed  - Refer to Case Management Department for coordinating discharge planning if the patient needs post-hospital services based on physician/advanced practitioner order or complex needs related to functional status, cognitive ability, or social support system  Outcome: Progressing     Problem: Knowledge Deficit  Goal: Patient/family/caregiver demonstrates understanding of disease process, treatment plan, medications, and discharge instructions  Description: Complete learning assessment and assess knowledge base.  Interventions:  - Provide teaching at level of understanding  - Provide teaching via preferred learning methods  Outcome: Progressing     Problem: Prexisting or High Potential for Compromised Skin Integrity  Goal: Skin integrity is maintained or improved  Description: INTERVENTIONS:  - Identify patients at risk for skin breakdown  - Assess and monitor skin integrity  - Assess and monitor nutrition and hydration status  - Monitor labs   - Assess for incontinence   - Turn and reposition patient  - Assist with mobility/ambulation  - Relieve pressure over bony prominences  - Avoid friction and shearing  - Provide appropriate hygiene as needed including keeping skin clean and dry  - Evaluate need for skin moisturizer/barrier cream  - Collaborate with interdisciplinary team   - Patient/family teaching  - Consider wound care consult   Outcome: Progressing

## 2024-03-11 NOTE — CASE MANAGEMENT
Case Management Discharge Planning Note    Patient name Jairon Oconnell  Location /-01 MRN [id number]  : 1965 Date 3/11/2024       Current Admission Date: 3/1/2024  Current Admission Diagnosis:Ambulatory dysfunction   Patient Active Problem List    Diagnosis Date Noted    Urinary retention 2024    Nondisplaced fracture of greater trochanter of right femur, subsequent encounter for closed fracture with routine healing 2024    Closed nondisplaced fracture of proximal phalanx of left index finger with routine healing, subsequent encounter 10/30/2023    Pressure ulcer of sacral region, stage 3 (HCC) 2023    Ambulatory dysfunction 2022    Social problem 2021    Hypomagnesemia 2021    Thrombocytopathia (HCC) 2020    Hyponatremia 2019    Metabolic encephalopathy 2019    Seborrheic dermatitis of scalp 2019    Nearsightedness 2019    Behavior concern in adult 2019    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HCC) 2019    Vitamin B12 deficiency 2017    Mixed hyperlipidemia 2016    Vitamin D deficiency 2016    Type 2 diabetes mellitus with diabetic neuropathy, without long-term current use of insulin ([facility]) 06/15/2016    Acquired hypothyroidism 06/15/2016    Cataract 2013    Ataxic cerebral palsy (HCC) 2013    Generalized convulsive epilepsy ([facility]) 2013    Essential hypertension 2013    Osteoporosis 2013    Scoliosis 2013      LOS (days): 10  Geometric Mean LOS (GMLOS) (days): 3.9  Days to GMLOS:-6.7     OBJECTIVE:  Risk of Unplanned Readmission Score: 25.23         Current admission status: Inpatient   Preferred Pharmacy:   [facility] [place] [facility] - [address]  [address] [address]  Phone: [phone] Fax: [phone]    Primary Care Provider: HUNTER Brown    Primary Insurance: MEDICARE  [place]    DISCHARGE DETAILS:    Discharge planning discussed with:: cm spoke with Lo at 9:35 am  [place] of Choice: Yes  Comments - Freedom of Choice: rehab recommended- additonal referrals have been sent- cm placed a call to [place]   LM   they veiwed but gave no response- [facility] was called  they reviewed and gave no response- cm called [facility]  they also reviewed & gave no response - cm left a message for them to call to review this case  CM contacted family/caregiver?: Yes             Contacts  Patient Contacts: Lila Rogers  Relationship to Patient:: Other (Comment)  Contact Method: Phone  Phone Number: [phone]  Reason/Outcome: Discharge Planning    Requested Home Health Care         Is the patient interested in HHC at discharge?: No    DME Referral Provided  Referral made for DME?: No    Other Referral/Resources/Interventions Provided:  Interventions: Short Term Rehab  Referral Comments: additonal referrals sent - lani has a meeting tomorrow to discuss d/c plan with his     Would you like to participate in our [facility] service program?  : No - Declined    Treatment Team Recommendation:  (d/c plan TBD- TBD)

## 2024-03-11 NOTE — PLAN OF CARE
Problem: PHYSICAL THERAPY ADULT  Goal: Performs mobility at highest level of function for planned discharge setting.  See evaluation for individualized goals.  Description: Treatment/Interventions: Functional transfer training, LE strengthening/ROM, Elevations, Therapeutic exercise, Endurance training, Cognitive reorientation, Patient/family training, Equipment eval/education, Bed mobility, Spoke to nursing, Spoke to case management          See flowsheet documentation for full assessment, interventions and recommendations.  Outcome: Progressing  Note: Prognosis: Fair  Problem List: Decreased strength, Decreased endurance, Decreased mobility, Decreased coordination, Decreased cognition, Impaired judgement, Decreased safety awareness, Pain, Orthopedic restrictions  Assessment: Pt seen for PT treatment session this date, consisting of ther act focused on bed mobility and functional transfer training from bed>recliner . Since previous session, pt has made minimal progress in terms of improved OOB mobility progression, further ambulation attempt unable to be performed this session d/t pt with lethargy and decreased engagement throughout session and easily falling asleep. Current goals and POC established on IE remain appropriate, pt continues to have rehab potential and is making fair progress towards STGs. Pt prognosis for achieving goals is fair, pending pt progress with hospitalization/medical status improvements, and indicated by supportive family/caregivers and previous response to intervention. Pt continues to be functioning below baseline level, and remains limited 2* factors listed above. PT will continue to see pt during current hospitalization in order to address the deficits listed above and provide interventions consistent w/ POC in effort to achieve STGs. PT recommends level 2, moderate resource intensity upon discharge.  Barriers to Discharge: Other (Comment) (pt resides in a group home setting)      Rehab Resource Intensity Level, PT: II (Moderate Resource Intensity)    See flowsheet documentation for full assessment.

## 2024-03-11 NOTE — PLAN OF CARE
Problem: PAIN - ADULT  Goal: Verbalizes/displays adequate comfort level or baseline comfort level  Description: Interventions:  - Encourage patient to monitor pain and request assistance  - Assess pain using appropriate pain scale  - Administer analgesics based on type and severity of pain and evaluate response  - Implement non-pharmacological measures as appropriate and evaluate response  - Consider cultural and social influences on pain and pain management  - Notify physician/advanced practitioner if interventions unsuccessful or patient reports new pain  Outcome: Progressing     Problem: INFECTION - ADULT  Goal: Absence or prevention of progression during hospitalization  Description: INTERVENTIONS:  - Assess and monitor for signs and symptoms of infection  - Monitor lab/diagnostic results  - Monitor all insertion sites, i.e. indwelling lines, tubes, and drains  - Monitor endotracheal if appropriate and nasal secretions for changes in amount and color  - [place] appropriate cooling/warming therapies per order  - Administer medications as ordered  - Instruct and encourage patient and family to use good hand hygiene technique  - Identify and instruct in appropriate isolation precautions for identified infection/condition  Outcome: Progressing  Goal: Absence of fever/infection during neutropenic period  Description: INTERVENTIONS:  - Monitor WBC    Outcome: Progressing     Problem: SAFETY ADULT  Goal: Patient will remain free of falls  Description: INTERVENTIONS:  - Educate patient/family on patient safety including physical limitations  - Instruct patient to call for assistance with activity   - Consult OT/PT to assist with strengthening/mobility   - Keep Call bell within reach  - Keep bed low and locked with side rails adjusted as appropriate  - Keep care items and personal belongings within reach  - Initiate and maintain comfort rounds  - Make Fall Risk Sign visible to staff  - Offer Toileting every 2 Hours,  in advance of need  - Initiate/Maintain bed alarm  - Obtain necessary fall risk management equipment:  socks  - Apply yellow socks and bracelet for high fall risk patients  - Consider moving patient to room near nurses station  Outcome: Progressing  Goal: Maintain or return to baseline ADL function  Description: INTERVENTIONS:  -  Assess patient's ability to carry out ADLs; assess patient's baseline for ADL function and identify physical deficits which impact ability to perform ADLs (bathing, care of mouth/teeth, toileting, grooming, dressing, etc.)  - Assess/evaluate cause of self-care deficits   - Assess range of motion  - Assess patient's mobility; develop plan if impaired  - Assess patient's need for assistive devices and provide as appropriate  - Encourage maximum independence but intervene and supervise when necessary  - Involve family in performance of ADLs  - Assess for home care needs following discharge   - Consider OT consult to assist with ADL evaluation and planning for discharge  - Provide patient education as appropriate  Outcome: Progressing  Goal: Maintains/Returns to pre admission functional level  Description: INTERVENTIONS:  - Perform AM-PAC 6 Click Basic Mobility/ Daily Activity assessment daily.  - Set and communicate daily mobility goal to care team and patient/family/caregiver.   - Collaborate with rehabilitation services on mobility goals if consulted  - Perform Range of Motion 3 times a day.  - Reposition patient every 2 hours.  - Dangle patient 3 times a day  - Stand patient 3 times a day  - Ambulate patient 3 times a day  - Out of bed to chair 3 times a day   - Out of bed for meals 3 times a day  - Out of bed for toileting  - Record patient progress and toleration of activity level   Outcome: Progressing     Problem: DISCHARGE PLANNING  Goal: Discharge to home or other facility with appropriate resources  Description: INTERVENTIONS:  - Identify barriers to discharge w/patient and  caregiver  - Arrange for needed discharge resources and transportation as appropriate  - Identify discharge learning needs (meds, wound care, etc.)  - Arrange for interpretive services to assist at discharge as needed  - Refer to Case Management Department for coordinating discharge planning if the patient needs post-hospital services based on physician/advanced practitioner order or complex needs related to functional status, cognitive ability, or social support system  Outcome: Progressing     Problem: Knowledge Deficit  Goal: Patient/family/caregiver demonstrates understanding of disease process, treatment plan, medications, and discharge instructions  Description: Complete learning assessment and assess knowledge base.  Interventions:  - Provide teaching at level of understanding  - Provide teaching via preferred learning methods  Outcome: Progressing     Problem: Prexisting or High Potential for Compromised Skin Integrity  Goal: Skin integrity is maintained or improved  Description: INTERVENTIONS:  - Identify patients at risk for skin breakdown  - Assess and monitor skin integrity  - Assess and monitor nutrition and hydration status  - Monitor labs   - Assess for incontinence   - Turn and reposition patient  - Assist with mobility/ambulation  - Relieve pressure over bony prominences  - Avoid friction and shearing  - Provide appropriate hygiene as needed including keeping skin clean and dry  - Evaluate need for skin moisturizer/barrier cream  - Collaborate with interdisciplinary team   - Patient/family teaching  - Consider wound care consult   Outcome: Progressing

## 2024-03-12 LAB
GLUCOSE SERPL-MCNC: 117 MG/DL (ref 65–140)
GLUCOSE SERPL-MCNC: 119 MG/DL (ref 65–140)
GLUCOSE SERPL-MCNC: 172 MG/DL (ref 65–140)
GLUCOSE SERPL-MCNC: 201 MG/DL (ref 65–140)

## 2024-03-12 PROCEDURE — 82948 REAGENT STRIP/BLOOD GLUCOSE: CPT

## 2024-03-12 PROCEDURE — 99232 SBSQ HOSP IP/OBS MODERATE 35: CPT | Performed by: NURSE PRACTITIONER

## 2024-03-12 RX ORDER — DIVALPROEX SODIUM 250 MG
250 TABLET, EXTENDED RELEASE 24 HR ORAL DAILY
Qty: 30 TABLET | Refills: 0 | Status: SHIPPED | OUTPATIENT
Start: 2024-03-12 | End: 2024-04-11

## 2024-03-12 RX ORDER — DIVALPROEX SODIUM 250 MG
250 TABLET, EXTENDED RELEASE 24 HR ORAL DAILY
Qty: 30 TABLET | Refills: 0 | Status: SHIPPED | OUTPATIENT
Start: 2024-03-12 | End: 2024-03-12

## 2024-03-12 RX ORDER — DIVALPROEX SODIUM 500 MG
TABLET, DELAYED RELEASE (ENTERIC COATED) ORAL
Qty: 90 TABLET | Refills: 0 | Status: SHIPPED | OUTPATIENT
Start: 2024-03-12

## 2024-03-12 RX ORDER — DIVALPROEX SODIUM 500 MG
TABLET, DELAYED RELEASE (ENTERIC COATED) ORAL
Qty: 90 TABLET | Refills: 0 | Status: SHIPPED | OUTPATIENT
Start: 2024-03-12 | End: 2024-03-12

## 2024-03-12 RX ADMIN — LEVETIRACETAM 1500 MG: 500 TABLET, FILM COATED ORAL at 08:44

## 2024-03-12 RX ADMIN — B-COMPLEX W/ C & FOLIC ACID TAB 1 TABLET: TAB at 08:44

## 2024-03-12 RX ADMIN — QUETIAPINE FUMARATE 300 MG: 150 TABLET, EXTENDED RELEASE ORAL at 21:22

## 2024-03-12 RX ADMIN — DIVALPROEX SODIUM 1000 MG: 500 TABLET, DELAYED RELEASE ORAL at 21:22

## 2024-03-12 RX ADMIN — QUETIAPINE FUMARATE 150 MG: 150 TABLET, EXTENDED RELEASE ORAL at 15:09

## 2024-03-12 RX ADMIN — INSULIN LISPRO 1 UNITS: 100 INJECTION, SOLUTION INTRAVENOUS; SUBCUTANEOUS at 16:31

## 2024-03-12 RX ADMIN — PRAVASTATIN SODIUM 80 MG: 40 TABLET ORAL at 16:29

## 2024-03-12 RX ADMIN — DIVALPROEX SODIUM 750 MG: 250 TABLET, DELAYED RELEASE ORAL at 08:44

## 2024-03-12 RX ADMIN — TEMAZEPAM 15 MG: 7.5 CAPSULE ORAL at 00:32

## 2024-03-12 RX ADMIN — LISINOPRIL 10 MG: 10 TABLET ORAL at 08:45

## 2024-03-12 RX ADMIN — GABAPENTIN 400 MG: 400 CAPSULE ORAL at 21:22

## 2024-03-12 RX ADMIN — QUETIAPINE FUMARATE 150 MG: 150 TABLET, EXTENDED RELEASE ORAL at 08:45

## 2024-03-12 RX ADMIN — LEVOTHYROXINE SODIUM 150 MCG: 75 TABLET ORAL at 08:44

## 2024-03-12 RX ADMIN — ACETAMINOPHEN 975 MG: 325 TABLET ORAL at 08:45

## 2024-03-12 RX ADMIN — GABAPENTIN 400 MG: 400 CAPSULE ORAL at 16:30

## 2024-03-12 RX ADMIN — INSULIN LISPRO 2 UNITS: 100 INJECTION, SOLUTION INTRAVENOUS; SUBCUTANEOUS at 21:48

## 2024-03-12 RX ADMIN — LEVETIRACETAM 1500 MG: 500 TABLET, FILM COATED ORAL at 21:22

## 2024-03-12 RX ADMIN — TAMSULOSIN HYDROCHLORIDE 0.4 MG: 0.4 CAPSULE ORAL at 16:30

## 2024-03-12 RX ADMIN — ENOXAPARIN SODIUM 40 MG: 40 INJECTION SUBCUTANEOUS at 08:45

## 2024-03-12 RX ADMIN — LACOSAMIDE 200 MG: 150 TABLET ORAL at 21:21

## 2024-03-12 RX ADMIN — LACOSAMIDE 150 MG: 150 TABLET ORAL at 08:44

## 2024-03-12 RX ADMIN — GABAPENTIN 400 MG: 400 CAPSULE ORAL at 08:45

## 2024-03-12 RX ADMIN — LORATADINE 10 MG: 10 TABLET ORAL at 08:45

## 2024-03-12 RX ADMIN — ZONISAMIDE 300 MG: 100 CAPSULE ORAL at 21:21

## 2024-03-12 NOTE — PLAN OF CARE
Problem: PAIN - ADULT  Goal: Verbalizes/displays adequate comfort level or baseline comfort level  Description: Interventions:  - Encourage patient to monitor pain and request assistance  - Assess pain using appropriate pain scale  - Administer analgesics based on type and severity of pain and evaluate response  - Implement non-pharmacological measures as appropriate and evaluate response  - Consider cultural and social influences on pain and pain management  - Notify physician/advanced practitioner if interventions unsuccessful or patient reports new pain  Outcome: Progressing     Problem: INFECTION - ADULT  Goal: Absence or prevention of progression during hospitalization  Description: INTERVENTIONS:  - Assess and monitor for signs and symptoms of infection  - Monitor lab/diagnostic results  - Monitor all insertion sites, i.e. indwelling lines, tubes, and drains  - Monitor endotracheal if appropriate and nasal secretions for changes in amount and color  - [place] appropriate cooling/warming therapies per order  - Administer medications as ordered  - Instruct and encourage patient and family to use good hand hygiene technique  - Identify and instruct in appropriate isolation precautions for identified infection/condition  Outcome: Progressing

## 2024-03-12 NOTE — ASSESSMENT & PLAN NOTE
Continue Depakote 1000 mg p.o. nightly and 750 mg p.o. daily, (per  must be brand name) Neurontin 400 mg p.o. 3 times daily, Vimpat 150 mg p.o. daily and 200 mg p.o. nightly, zonisamide 300 mg HS, and Keppra 1500 mg p.o. every 12 hours  Continue seizure precautions

## 2024-03-12 NOTE — ASSESSMENT & PLAN NOTE
Lab Results   Component Value Date    HGBA1C 5.5 11/27/2023       Recent Labs     03/11/24  1116 03/11/24  1618 03/11/24 2024 03/12/24  0737   POCGLU 131 95 207* 119         Blood Sugar Average: Last 72 hrs:  (P) [id number]    Continue diabetic diet      Continue fingerstick blood sugar with sliding scale coverage  Hypoglycemia protocol

## 2024-03-12 NOTE — CASE MANAGEMENT
Case Management Discharge Planning Note    Patient name Jairon Oconnell  Location /-01 MRN [id number]  : 1965 Date 3/12/2024       Current Admission Date: 3/1/2024  Current Admission Diagnosis:Ambulatory dysfunction   Patient Active Problem List    Diagnosis Date Noted    Urinary retention 2024    Nondisplaced fracture of greater trochanter of right femur, subsequent encounter for closed fracture with routine healing 2024    Closed nondisplaced fracture of proximal phalanx of left index finger with routine healing, subsequent encounter 10/30/2023    Pressure ulcer of sacral region, stage 3 (HCC) 2023    Ambulatory dysfunction 2022    Social problem 2021    Hypomagnesemia 2021    Thrombocytopathia ([facility]) 2020    Hyponatremia 2019    Metabolic encephalopathy 2019    Seborrheic dermatitis of scalp 2019    Nearsightedness 2019    Behavior concern in adult 2019    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HCC) 2019    Vitamin B12 deficiency 2017    Mixed hyperlipidemia 2016    Vitamin D deficiency 2016    Type 2 diabetes mellitus with diabetic neuropathy, without long-term current use of insulin ([facility]) 06/15/2016    Acquired hypothyroidism 06/15/2016    Cataract 2013    Ataxic cerebral palsy ([facility]) 2013    Generalized convulsive epilepsy ([facility]) 2013    Essential hypertension 2013    Osteoporosis 2013    Scoliosis 2013      LOS (days): 11  Geometric Mean LOS (GMLOS) (days): 3.9  Days to GMLOS:-7.8     OBJECTIVE:  Risk of Unplanned Readmission Score: 25.56         Current admission status: Inpatient   Preferred Pharmacy:   [facility] - [place] - [address]  [address] [place] [address]  Phone: [phone] Fax: [phone]    [id number] [place] - [address] [address]  Phone:  [phone] Fax: [phone]    Primary Care Provider: HUNTER Brown    Primary Insurance: MEDICARE  [place]    DISCHARGE DETAILS:    Discharge planning discussed with:: janeth Correa & Chantell at the bedside  Freedom of Choice: Yes  Comments - Freedom of Choice: rehab is recommended - cm was able to reach [facility] they are not able to offer a bed- cm  called [facility] cm has not been able to reach anyone in admissions- additional referrals have been sent  CM contacted family/caregiver?: Yes             Contacts  Patient Contacts: Lila Rogers & Madeline Arvizu  Relationship to Patient:: Other (Comment) (support coordiator & [facility] CM)  Contact Method: Phone, In Person  Phone Number: [phone] Lila Correa   [phone]  Reason/Outcome: Discharge Planning    Requested Home Health Care         Is the patient interested in HHC at discharge?: No    DME Referral Provided  Referral made for DME?: No    Other Referral/Resources/Interventions Provided:  Interventions: Other (Specify)  Referral Comments: additonal snf referrals have been sent    Would you like to participate in our [facility] service program?  : No - Declined    Treatment Team Recommendation:  (d/c plan tbd- bls)                                            Additional Comments: cm was made aware by Lo ( ) that the pt can not have the generic depakote- he can only have the name brand- cm spoke with the pharmacist and we have only generic - cm was told that they can bring the brand name depakote from his pharmacy and they can administer- pt will need to take these meds with him when he is d/c- rx was sent to [facility]- cm called [facility] at 17:41pm # [phone]  they are able to deliver to the [facility] office tomorrow morning and riya Correa will bring to the hospital - cm called Madeline ati 17:35 pm and 17:45 pm LM to let her know the meds will be  delivered to the office in the morning

## 2024-03-12 NOTE — PROGRESS NOTES
[facility]  Progress Note  Name: Jairon Oconnell I  MRN: [id number]  Unit/Bed#: [facility]-01 I Date of Admission: 3/1/2024   Date of Service: 3/12/2024 I Hospital Day: 11    Assessment/Plan     * Ambulatory dysfunction  Assessment & Plan  With non-operative right hip greater trochanter fracture seen and evaluated by orthopedic surgery on the previous admission  He was discharged to a skilled nursing facility, became agitated, broke a chair, and was reported to become aggressive with staff.  The facility will now not take him back  Psychiatry recommendations appreciated. No indication for inpatient psychiatric hospitalization at this time.  Recommendation is rehab/SNF  PT OT recommendations appreciated.  Medically clear for discharge to rehab facility   Case management following, input appreciated          Generalized convulsive epilepsy (HCC)  Assessment & Plan  Continue Depakote DR 1000 mg p.o. nightly and 750 mg p.o. daily, Neurontin 400 mg p.o. 3 times daily, Vimpat 150 mg p.o. daily and 200 mg p.o. nightly, zonisamide 300 mg HS, and Keppra 1500 mg p.o. every 12 hours  Continue seizure precautions         Type 2 diabetes mellitus with diabetic neuropathy, without long-term current use of insulin (HCC)  Assessment & Plan  Lab Results   Component Value Date    HGBA1C 5.5 11/27/2023       Recent Labs     03/11/24  1116 03/11/24  1618 03/11/24 2024 03/12/24  0737   POCGLU 131 95 207* 119         Blood Sugar Average: Last 72 hrs:  (P) 155.9854676487881037    Continue diabetic diet      Continue fingerstick blood sugar with sliding scale coverage  Hypoglycemia protocol    Urinary retention  Assessment & Plan  S/p chapman catheter, since discontinued   Continue tamsulosin  Continue urinary retention protocol          Pressure ulcer of sacral region, stage 3 (HCC)  Assessment & Plan  POA  Wound care recommendations appreciated: Scarring to the left lateral buttock from stage 3 pressure injury in the  past. Scarring is intact and blanchable and hyperpigmented   Turn and reposition every 2 hours, frequent offloading         Behavior concern in adult  Assessment & Plan  He has been cooperative without any agitation or behavioral issues   Seroquel XR decreased to 150 BID and 300 HS due to excessive sleepiness        Acquired hypothyroidism  Assessment & Plan  Continue levothyroxine 150 mcg p.o. every morning         Essential hypertension  Assessment & Plan  Continue lisinopril 10 mg p.o. daily  Monitor blood pressure          Mixed hyperlipidemia  Assessment & Plan  Continue Pravachol 80 mg oral daily at bedtime          Ataxic cerebral palsy (HCC)  Assessment & Plan  Continue supportive care   Continue with current medications               VTE Pharmacologic Prophylaxis: VTE Score: 3 Moderate Risk (Score 3-4) - Pharmacological DVT Prophylaxis Ordered: enoxaparin (Lovenox).    Mobility:   Basic Mobility Inpatient Raw Score: 14  JH-HLM Goal: 4: Move to chair/commode  JH-HLM Achieved: 4: Move to chair/commode  HLM Goal achieved. Continue to encourage appropriate mobility.    Patient Centered Rounds: I performed bedside rounds with nursing staff today.   Discussions with Specialists or Other Care Team Provider: GORDON    Education and Discussions with Family / Patient:  No medical updates to provide. .     Total Time Spent on Date of Encounter in care of patient: 28 mins. This time was spent on one or more of the following: performing physical exam; counseling and coordination of care; obtaining or reviewing history; documenting in the medical record; reviewing/ordering tests, medications or procedures; communicating with other healthcare professionals and discussing with patient's family/caregivers.    Current Length of Stay: 11 day(s)  Current Patient Status: Inpatient   Certification Statement: The patient will continue to require additional inpatient hospital stay due to care coordination.   Discharge Plan: Anticipate  discharge in 24-48 hrs to rehab facility.    Code Status: Level 1 - Full Code    Subjective:   No complaints offered.     Objective:     Vitals:   Temp (24hrs), Av.2 °F (36.2 °C), Min:97.2 °F (36.2 °C), Max:97.2 °F (36.2 °C)    Temp:  [97.2 °F (36.2 °C)] 97.2 °F (36.2 °C)  HR:  [81] 81  Resp:  [18] 18  BP: (127-137)/(74-78) 137/78  SpO2:  [95 %] 95 %  Body mass index is 19.88 kg/m².     Input and Output Summary (last 24 hours):     Intake/Output Summary (Last 24 hours) at 3/12/2024 1031  Last data filed at 3/11/2024 2305  Gross per 24 hour   Intake 712 ml   Output 850 ml   Net -138 ml       Physical Exam:   Physical Exam  Vitals and nursing note reviewed.   Constitutional:       General: He is not in acute distress.  HENT:      Head: Normocephalic and atraumatic.      Nose: Nose normal.      Mouth/Throat:      Mouth: Mucous membranes are moist.      Pharynx: Oropharynx is clear.   Eyes:      Pupils: Pupils are equal, round, and reactive to light.   Cardiovascular:      Rate and Rhythm: Normal rate and regular rhythm.      Pulses: Normal pulses.      Heart sounds: Normal heart sounds.   Pulmonary:      Effort: Pulmonary effort is normal. No respiratory distress.      Breath sounds: Normal breath sounds.   Abdominal:      General: Bowel sounds are normal.      Palpations: Abdomen is soft.      Tenderness: There is no abdominal tenderness.   Musculoskeletal:      Cervical back: Neck supple.      Right lower leg: No edema.      Left lower leg: No edema.   Skin:     General: Skin is warm and dry.      Capillary Refill: Capillary refill takes less than 2 seconds.   Neurological:      General: No focal deficit present.      Mental Status: He is alert. Mental status is at baseline.          Additional Data:     Labs:  Results from last 7 days   Lab Units 24  0519   WBC Thousand/uL 7.35   HEMOGLOBIN g/dL 13.0   HEMATOCRIT % 40.1   PLATELETS Thousands/uL 139*     Results from last 7 days   Lab Units 24  0519    SODIUM mmol/L 139   POTASSIUM mmol/L 4.1   CHLORIDE mmol/L 104   CO2 mmol/L 25   BUN mg/dL 34*   CREATININE mg/dL 0.72   ANION GAP mmol/L 10   CALCIUM mg/dL 9.7   GLUCOSE RANDOM mg/dL 108         Results from last 7 days   Lab Units 03/12/24  0737 03/11/24 2024 03/11/24  1618 03/11/24  1116 03/10/24  2050 03/10/24  1604 03/10/24  1124 03/10/24  0631 03/09/24 2029 03/09/24  1557 03/09/24  1056 03/09/24  0729   POC GLUCOSE mg/dl 119 207* 95 131 136 182* 104 153* 196* 108 319* 120               Lines/Drains:  Invasive Devices       None                       Last 24 Hours Medication List:   Current Facility-Administered Medications   Medication Dose Route Frequency Provider Last Rate    acetaminophen  650 mg Oral 4x Daily PRN HUNTER Lopez      acetaminophen  975 mg Oral Q12H HUNTER Lopez      divalproex sodium  1,000 mg Oral HS Hasmukh Becker PA-C      divalproex sodium  750 mg Oral Daily Hasmukh Becker PA-C      docusate sodium  100 mg Oral BID PRN Hasmukh Becker PA-C      enoxaparin  40 mg Subcutaneous Daily Hasmukh Becker PA-C      gabapentin  400 mg Oral TID Hasmukh Becker PA-C      ibuprofen  600 mg Oral Q6H PRN Ancelmo Mota PA-C      insulin lispro  1-5 Units Subcutaneous TID AC Hasmukh Becker PA-C      insulin lispro  1-6 Units Subcutaneous HS Hasmukh Becker PA-C      lacosamide  150 mg Oral Daily Hasmukh Becker PA-C      lacosamide  200 mg Oral HS Hasmukh Becker PA-C      levETIRAcetam  1,500 mg Oral Q12H [facility] Hasmukh Becker PA-C      levothyroxine  150 mcg Oral Daily Hasmukh Becker PA-C      lisinopril  10 mg Oral Daily Hasmukh Becker PA-C      loratadine  10 mg Oral Daily Hasmukh Becker PA-C      multivitamin stress formula  1 tablet Oral Daily Hasmukh Becker PA-C      OLANZapine  5 mg Intramuscular Q3H PRN Hasmukh Becker PA-C      pravastatin  80 mg Oral Daily With Dinner Hasmukh Becker PA-C      QUEtiapine  150 mg Oral BID HUNTER Lopez      QUEtiapine  300 mg Oral HS Lizzie  HUNTER Krause      tamsulosin  0.4 mg Oral Daily With Dinner HUNTER Lopez      temazepam  15 mg Oral HS PRN Hasmukh Becker PA-C      zonisamide  300 mg Oral HS Hasmukh Becker PA-C          Today, Patient Was Seen By: HUNTER Main    **Please Note: This note may have been constructed using a voice recognition system.**

## 2024-03-12 NOTE — PHYSICAL THERAPY NOTE
Physical Therapy Cancellation Note       03/12/24 1021   PT Last Visit   PT Visit Date 03/12/24   Note Type   Note Type Cancelled Session   Cancel Reasons Other  (pt lethargic, opens eyes to verbal stimulation however nods off to sleep easily)         Chart review performed. At this time, PT treatment session cancelled as pt lethargic, difficulty with participation. PT will follow and provide PT interventions as appropriate.    Annabel Dorado, PT, DPT

## 2024-03-13 LAB
GLUCOSE SERPL-MCNC: 118 MG/DL (ref 65–140)
GLUCOSE SERPL-MCNC: 139 MG/DL (ref 65–140)
GLUCOSE SERPL-MCNC: 140 MG/DL (ref 65–140)
GLUCOSE SERPL-MCNC: 161 MG/DL (ref 65–140)

## 2024-03-13 PROCEDURE — 99232 SBSQ HOSP IP/OBS MODERATE 35: CPT | Performed by: NURSE PRACTITIONER

## 2024-03-13 PROCEDURE — 82948 REAGENT STRIP/BLOOD GLUCOSE: CPT

## 2024-03-13 RX ADMIN — ENOXAPARIN SODIUM 40 MG: 40 INJECTION SUBCUTANEOUS at 09:04

## 2024-03-13 RX ADMIN — LORATADINE 10 MG: 10 TABLET ORAL at 09:03

## 2024-03-13 RX ADMIN — GABAPENTIN 400 MG: 400 CAPSULE ORAL at 17:08

## 2024-03-13 RX ADMIN — QUETIAPINE FUMARATE 150 MG: 150 TABLET, EXTENDED RELEASE ORAL at 14:51

## 2024-03-13 RX ADMIN — B-COMPLEX W/ C & FOLIC ACID TAB 1 TABLET: TAB at 09:03

## 2024-03-13 RX ADMIN — LACOSAMIDE 150 MG: 150 TABLET ORAL at 09:02

## 2024-03-13 RX ADMIN — LEVOTHYROXINE SODIUM 150 MCG: 75 TABLET ORAL at 09:02

## 2024-03-13 RX ADMIN — QUETIAPINE FUMARATE 300 MG: 150 TABLET, EXTENDED RELEASE ORAL at 21:26

## 2024-03-13 RX ADMIN — DIVALPROEX SODIUM 750 MG: 250 TABLET, DELAYED RELEASE ORAL at 09:03

## 2024-03-13 RX ADMIN — PRAVASTATIN SODIUM 80 MG: 40 TABLET ORAL at 17:07

## 2024-03-13 RX ADMIN — GABAPENTIN 400 MG: 400 CAPSULE ORAL at 21:26

## 2024-03-13 RX ADMIN — INSULIN LISPRO 1 UNITS: 100 INJECTION, SOLUTION INTRAVENOUS; SUBCUTANEOUS at 21:30

## 2024-03-13 RX ADMIN — LEVETIRACETAM 1500 MG: 500 TABLET, FILM COATED ORAL at 09:03

## 2024-03-13 RX ADMIN — GABAPENTIN 400 MG: 400 CAPSULE ORAL at 09:03

## 2024-03-13 RX ADMIN — QUETIAPINE FUMARATE 150 MG: 150 TABLET, EXTENDED RELEASE ORAL at 09:02

## 2024-03-13 RX ADMIN — DIVALPROEX SODIUM 1000 MG: 500 TABLET, DELAYED RELEASE ORAL at 21:26

## 2024-03-13 RX ADMIN — LEVETIRACETAM 1500 MG: 500 TABLET, FILM COATED ORAL at 21:27

## 2024-03-13 RX ADMIN — TAMSULOSIN HYDROCHLORIDE 0.4 MG: 0.4 CAPSULE ORAL at 17:08

## 2024-03-13 RX ADMIN — ZONISAMIDE 300 MG: 100 CAPSULE ORAL at 21:26

## 2024-03-13 RX ADMIN — LACOSAMIDE 200 MG: 150 TABLET ORAL at 21:26

## 2024-03-13 RX ADMIN — LISINOPRIL 10 MG: 10 TABLET ORAL at 09:03

## 2024-03-13 NOTE — ASSESSMENT & PLAN NOTE
Lab Results   Component Value Date    HGBA1C 5.5 11/27/2023       Recent Labs     03/13/24  1124 03/13/24  1613 03/13/24 2038 03/14/24  0756   POCGLU 140 139 161* 121       Blood Sugar Average: Last 72 hrs:  (P) 143.3105139063609539    Continue diabetic diet       Continue fingerstick blood sugar with sliding scale coverage  Hypoglycemia protocol

## 2024-03-13 NOTE — PROGRESS NOTES
[facility]  Progress Note  Name: Jairon Oconnell I  MRN: [id number]  Unit/Bed#: -01 I Date of Admission: 3/1/2024   Date of Service: 3/13/2024 I Hospital Day: 12    Assessment/Plan   * Ambulatory dysfunction  Assessment & Plan  With non-operative right hip greater trochanter fracture seen and evaluated by orthopedic surgery on the previous admission  He was discharged to a skilled nursing facility, became agitated, broke a chair, and was reported to become aggressive with staff.  The facility will now not take him back  Psychiatry recommendations appreciated. No indication for inpatient psychiatric hospitalization at this time.  Recommendation is rehab/SNF  PT/OT recommendations appreciated.  Medically clear for discharge to rehab facility   Case management following, input appreciated           Urinary retention  Assessment & Plan  S/p chapman catheter, since discontinued   Continue tamsulosin  Continue urinary retention protocol           Pressure ulcer of sacral region, stage 3 (HCC)  Assessment & Plan  POA  Wound care recommendations appreciated: Scarring to the left lateral buttock from stage 3 pressure injury in the past. Scarring is intact and blanchable and hyperpigmented   Turn and reposition every 2 hours, frequent offloading          Behavior concern in adult  Assessment & Plan  He has been cooperative without any agitation or behavioral issues   Seroquel XR decreased to 150 BID and 300 HS due to excessive sleepiness          Acquired hypothyroidism  Assessment & Plan  Continue levothyroxine 150 mcg p.o. every morning          Essential hypertension  Assessment & Plan  Continue lisinopril 10 mg p.o. daily  Monitor blood pressure           Mixed hyperlipidemia  Assessment & Plan  Continue Pravachol 80 mg oral daily at bedtime           Generalized convulsive epilepsy (HCC)  Assessment & Plan  Continue Depakote 1000 mg p.o. nightly and 750 mg p.o. daily, (per   must be brand name) Neurontin 400 mg p.o. 3 times daily, Vimpat 150 mg p.o. daily and 200 mg p.o. nightly, zonisamide 300 mg HS, and Keppra 1500 mg p.o. every 12 hours  Continue seizure precautions          Type 2 diabetes mellitus with diabetic neuropathy, without long-term current use of insulin (HCC)  Assessment & Plan  Lab Results   Component Value Date    HGBA1C 5.5 11/27/2023       Recent Labs     03/12/24  1608 03/12/24  2118 03/13/24  0721 03/13/24  1124   POCGLU 172* 201* 118 140       Blood Sugar Average: Last 72 hrs:  (P) 144.4637683001948970    Continue diabetic diet       Continue fingerstick blood sugar with sliding scale coverage  Hypoglycemia protocol    Ataxic cerebral palsy (HCC)  Assessment & Plan  Continue supportive care   Continue with current medications                    VTE Pharmacologic Prophylaxis: VTE Score: 3 Moderate Risk (Score 3-4) - Pharmacological DVT Prophylaxis Ordered: enoxaparin (Lovenox).    Mobility:   Basic Mobility Inpatient Raw Score: 14  -HLM Goal: 4: Move to chair/commode  JH-HLM Achieved: 6: Walk 10 steps or more  HLM Goal NOT achieved. Continue with multidisciplinary rounding and encourage appropriate mobility to improve upon HLM goals.    Patient Centered Rounds: I performed bedside rounds with nursing staff today.   Discussions with Specialists or Other Care Team Provider: CM, OT/PT     Education and Discussions with Family / Patient:  patient was updated.     Total Time Spent on Date of Encounter in care of patient: 22 mins. This time was spent on one or more of the following: performing physical exam; counseling and coordination of care; obtaining or reviewing history; documenting in the medical record; reviewing/ordering tests, medications or procedures; communicating with other healthcare professionals and discussing with patient's family/caregivers.    Current Length of Stay: 12 day(s)  Current Patient Status: Inpatient   Certification Statement: The patient  will continue to require additional inpatient hospital stay due to ambulatory gait dysfunction  Discharge Plan: Anticipate discharge in 24-48 hrs to rehab facility.    Code Status: Level 1 - Full Code    Subjective:   The patient was seen and examined.  No acute event overnight. His group home will have the pharmacy deliver brand name of Depakote today.      Objective:     Vitals:   Temp (24hrs), Av.8 °F (36.6 °C), Min:97.8 °F (36.6 °C), Max:97.8 °F (36.6 °C)    Temp:  [97.8 °F (36.6 °C)] 97.8 °F (36.6 °C)  HR:  [96] 96  Resp:  [18] 18  BP: (125-151)/(78-84) 125/79  SpO2:  [95 %] 95 %  Body mass index is 19.88 kg/m².     Input and Output Summary (last 24 hours):     Intake/Output Summary (Last 24 hours) at 3/13/2024 1232  Last data filed at 3/13/2024 0841  Gross per 24 hour   Intake 880 ml   Output 700 ml   Net 180 ml         Physical Exam:   Physical Exam  Vitals and nursing note reviewed.   Constitutional:       General: He is not in acute distress.     Appearance: Normal appearance.      Comments: Frail and appearing older than age   HENT:      Head: Normocephalic and atraumatic.      Right Ear: External ear normal.      Left Ear: External ear normal.      Nose: Nose normal.      Mouth/Throat:      Mouth: Mucous membranes are moist.      Pharynx: Oropharynx is clear.   Eyes:      General:         Right eye: No discharge.         Left eye: No discharge.      Extraocular Movements: Extraocular movements intact.      Pupils: Pupils are equal, round, and reactive to light.   Cardiovascular:      Rate and Rhythm: Normal rate and regular rhythm.      Pulses: Normal pulses.      Heart sounds: Normal heart sounds. No murmur heard.  Pulmonary:      Effort: Pulmonary effort is normal. No respiratory distress.      Breath sounds: Normal breath sounds. No wheezing or rales.   Abdominal:      General: Bowel sounds are normal. There is no distension.      Palpations: Abdomen is soft. There is no mass.      Tenderness:  There is no abdominal tenderness.   Musculoskeletal:         General: No swelling, tenderness or deformity. Normal range of motion.      Cervical back: Normal range of motion and neck supple. No rigidity.   Skin:     General: Skin is warm and dry.      Capillary Refill: Capillary refill takes less than 2 seconds.      Coloration: Skin is not pale.      Findings: No erythema.   Neurological:      Mental Status: He is alert. Mental status is at baseline.      Comments: With periods of forgetfulness/confusion. Upper extremities tremors and ataxia. At baseline.    Psychiatric:         Attention and Perception: Attention normal.         Mood and Affect: Affect is flat.         Cognition and Memory: Cognition is impaired.       Additional Data:     Labs:  Results from last 7 days   Lab Units 03/11/24  0519   WBC Thousand/uL 7.35   HEMOGLOBIN g/dL 13.0   HEMATOCRIT % 40.1   PLATELETS Thousands/uL 139*       Results from last 7 days   Lab Units 03/11/24  0519   SODIUM mmol/L 139   POTASSIUM mmol/L 4.1   CHLORIDE mmol/L 104   CO2 mmol/L 25   BUN mg/dL 34*   CREATININE mg/dL 0.72   ANION GAP mmol/L 10   CALCIUM mg/dL 9.7   GLUCOSE RANDOM mg/dL 108           Results from last 7 days   Lab Units 03/13/24  1124 03/13/24  0721 03/12/24  2118 03/12/24  1608 03/12/24  1108 03/12/24  0737 03/11/24  2024 03/11/24  1618 03/11/24  1116 03/10/24  2050 03/10/24  1604 03/10/24  1124   POC GLUCOSE mg/dl 140 118 201* 172* 117 119 207* 95 131 136 182* 104               Lines/Drains:  Invasive Devices       None                 Urinary Catheter:  Goal for removal: Voiding trial when ambulation improves               Imaging: Reviewed radiology reports from this admission including: CT head and xray(s)    Recent Cultures (last 7 days):         Last 24 Hours Medication List:   Current Facility-Administered Medications   Medication Dose Route Frequency Provider Last Rate    acetaminophen  650 mg Oral 4x Daily PRN HUNTER Lopez       acetaminophen  975 mg Oral Q12H HUNTER Lopez      divalproex sodium  1,000 mg Oral HS Hasmukh Becker, PA-DENIA      divalproex sodium  750 mg Oral Daily Hasmukh Becker, PA-C      docusate sodium  100 mg Oral BID PRN Hasmukh Becker, PA-DENIA      enoxaparin  40 mg Subcutaneous Daily Hasmukh Becker, PA-C      gabapentin  400 mg Oral TID Hasmukh Becker, PA-C      ibuprofen  600 mg Oral Q6H PRN Ancelmo Mota, PA-DENIA      insulin lispro  1-5 Units Subcutaneous TID AC Hasmukh Becker, PA-C      insulin lispro  1-6 Units Subcutaneous HS Hasmukhagatha Becker, PA-C      lacosamide  150 mg Oral Daily Hasmukh Becker, PA-DENIA      lacosamide  200 mg Oral HS Hasmukh Becker, PA-DENIA      levETIRAcetam  1,500 mg Oral Q12H [facility] Hasmukh Becker, PA-DENIA      levothyroxine  150 mcg Oral Daily Hasmukh Becker, JAIR      lisinopril  10 mg Oral Daily Hasmukh Becker, JAIR      loratadine  10 mg Oral Daily Hasmukh Becker, PA-DENIA      multivitamin stress formula  1 tablet Oral Daily Hasmukh Becker, JAIR      OLANZapine  5 mg Intramuscular Q3H PRN Hasmukh Becker, JAIR      pravastatin  80 mg Oral Daily With Dinner Hasmukh Becker PA-C      QUEtiapine  150 mg Oral BID HUNTER Lopez      QUEtiapine  300 mg Oral HS HUNTER Lopez      tamsulosin  0.4 mg Oral Daily With Dinner HUNTER Lopez      temazepam  15 mg Oral HS PRN Hasmukh Becker, JAIR      zonisamide  300 mg Oral HS Hasmukh Becker, JAIR          Today, Patient Was Seen By: HUNTER Lopez    **Please Note: This note may have been constructed using a voice recognition system.**

## 2024-03-13 NOTE — PLAN OF CARE
Problem: PAIN - ADULT  Goal: Verbalizes/displays adequate comfort level or baseline comfort level  Description: Interventions:  - Encourage patient to monitor pain and request assistance  - Assess pain using appropriate pain scale  - Administer analgesics based on type and severity of pain and evaluate response  - Implement non-pharmacological measures as appropriate and evaluate response  - Consider cultural and social influences on pain and pain management  - Notify physician/advanced practitioner if interventions unsuccessful or patient reports new pain  Outcome: Progressing     Problem: INFECTION - ADULT  Goal: Absence or prevention of progression during hospitalization  Description: INTERVENTIONS:  - Assess and monitor for signs and symptoms of infection  - Monitor lab/diagnostic results  - Monitor all insertion sites, i.e. indwelling lines, tubes, and drains  - Monitor endotracheal if appropriate and nasal secretions for changes in amount and color  - [place] appropriate cooling/warming therapies per order  - Administer medications as ordered  - Instruct and encourage patient and family to use good hand hygiene technique  - Identify and instruct in appropriate isolation precautions for identified infection/condition  Outcome: Progressing     Problem: Knowledge Deficit  Goal: Patient/family/caregiver demonstrates understanding of disease process, treatment plan, medications, and discharge instructions  Description: Complete learning assessment and assess knowledge base.  Interventions:  - Provide teaching at level of understanding  - Provide teaching via preferred learning methods  Outcome: Progressing

## 2024-03-13 NOTE — ASSESSMENT & PLAN NOTE
With non-operative right hip greater trochanter fracture seen and evaluated by orthopedic surgery on the previous admission  He was discharged to a skilled nursing facility, became agitated, broke a chair, and was reported to become aggressive with staff.  The facility will now not take him back  Psychiatry recommendations appreciated. No indication for inpatient psychiatric hospitalization at this time.  Recommendation is rehab/SNF  PT/OT recommendations appreciated.  Medically clear for discharge to rehab facility   Case management following, input appreciated

## 2024-03-13 NOTE — CASE MANAGEMENT
Case Management Discharge Planning Note    Patient name Jairon Oconnell  Location /-01 MRN [id number]  : 1965 Date 3/13/2024       Current Admission Date: 3/1/2024  Current Admission Diagnosis:Ambulatory dysfunction   Patient Active Problem List    Diagnosis Date Noted    Urinary retention 2024    Nondisplaced fracture of greater trochanter of right femur, subsequent encounter for closed fracture with routine healing 2024    Closed nondisplaced fracture of proximal phalanx of left index finger with routine healing, subsequent encounter 10/30/2023    Pressure ulcer of sacral region, stage 3 (HCC) 2023    Ambulatory dysfunction 2022    Social problem 2021    Hypomagnesemia 2021    Thrombocytopathia ([facility]) 2020    Hyponatremia 2019    Metabolic encephalopathy 2019    Seborrheic dermatitis of scalp 2019    Nearsightedness 2019    Behavior concern in adult 2019    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HCC) 2019    Vitamin B12 deficiency 2017    Mixed hyperlipidemia 2016    Vitamin D deficiency 2016    Type 2 diabetes mellitus with diabetic neuropathy, without long-term current use of insulin ([facility]) 06/15/2016    Acquired hypothyroidism 06/15/2016    Cataract 2013    Ataxic cerebral palsy ([facility]) 2013    Generalized convulsive epilepsy ([facility]) 2013    Essential hypertension 2013    Osteoporosis 2013    Scoliosis 2013      LOS (days): 12  Geometric Mean LOS (GMLOS) (days): 3.9  Days to GMLOS:-8.7     OBJECTIVE:  Risk of Unplanned Readmission Score: 26.14         Current admission status: Inpatient   Preferred Pharmacy:   [facility] [place] - [address]  [address] [address]  Phone: [phone] Fax: [phone]    [id number] - [facility] [place] - [address] [address]  Phone:  [phone] Fax: [phone]    Primary Care Provider: HUNTER Brown    Primary Insurance: MEDICARE  Secondary Insurance: [place] [employer]    DISCHARGE DETAILS:    Discharge planning discussed with:: cm spoke with Madeline Arvizu( cm at [facility]) at 9-8:23 am  [place] of Choice: Yes  Comments - [place] of Choice: rehab recommended- cm expanded the searching area   additonal referrals have been sent- his  & cm and others from the team are working on finding a new Life sharing home- his former caregiver is will ling to take him in,but they need to find a new home that is larger- there is respite family that may be able to take him in [facility]  CM contacted family/caregiver?: Yes             Contacts  Patient Contacts: Madeline Arvizu              Other Referral/Resources/Interventions Provided:  Interventions: Short Term Rehab  Referral Comments: additonal referrals sent via maribel - Madeline was made aware today that the pt's depakote brand name medication will be delivered to her office today - she will bring the medication here when it arrives    Would you like to participate in our [facility] service program?  : No - Declined    Treatment Team Recommendation:  (d/c plan tbd- BLS)

## 2024-03-13 NOTE — PLAN OF CARE
Problem: PAIN - ADULT  Goal: Verbalizes/displays adequate comfort level or baseline comfort level  Description: Interventions:  - Encourage patient to monitor pain and request assistance  - Assess pain using appropriate pain scale  - Administer analgesics based on type and severity of pain and evaluate response  - Implement non-pharmacological measures as appropriate and evaluate response  - Consider cultural and social influences on pain and pain management  - Notify physician/advanced practitioner if interventions unsuccessful or patient reports new pain  Outcome: Progressing     Problem: INFECTION - ADULT  Goal: Absence or prevention of progression during hospitalization  Description: INTERVENTIONS:  - Assess and monitor for signs and symptoms of infection  - Monitor lab/diagnostic results  - Monitor all insertion sites, i.e. indwelling lines, tubes, and drains  - Monitor endotracheal if appropriate and nasal secretions for changes in amount and color  - [place] appropriate cooling/warming therapies per order  - Administer medications as ordered  - Instruct and encourage patient and family to use good hand hygiene technique  - Identify and instruct in appropriate isolation precautions for identified infection/condition  Outcome: Progressing     Problem: Prexisting or High Potential for Compromised Skin Integrity  Goal: Skin integrity is maintained or improved  Description: INTERVENTIONS:  - Identify patients at risk for skin breakdown  - Assess and monitor skin integrity  - Assess and monitor nutrition and hydration status  - Monitor labs   - Assess for incontinence   - Turn and reposition patient  - Assist with mobility/ambulation  - Relieve pressure over bony prominences  - Avoid friction and shearing  - Provide appropriate hygiene as needed including keeping skin clean and dry  - Evaluate need for skin moisturizer/barrier cream  - Collaborate with interdisciplinary team   - Patient/family teaching  -  Consider wound care consult   Outcome: Progressing

## 2024-03-14 LAB
GLUCOSE SERPL-MCNC: 121 MG/DL (ref 65–140)
GLUCOSE SERPL-MCNC: 130 MG/DL (ref 65–140)
GLUCOSE SERPL-MCNC: 162 MG/DL (ref 65–140)
GLUCOSE SERPL-MCNC: 172 MG/DL (ref 65–140)

## 2024-03-14 PROCEDURE — 82948 REAGENT STRIP/BLOOD GLUCOSE: CPT

## 2024-03-14 PROCEDURE — 99231 SBSQ HOSP IP/OBS SF/LOW 25: CPT | Performed by: NURSE PRACTITIONER

## 2024-03-14 RX ORDER — DIVALPROEX SODIUM 500 MG/1
500 TABLET, DELAYED RELEASE ORAL DAILY
Status: DISCONTINUED | OUTPATIENT
Start: 2024-03-15 | End: 2024-04-01 | Stop reason: HOSPADM

## 2024-03-14 RX ORDER — DIVALPROEX SODIUM 250 MG/1
250 TABLET, EXTENDED RELEASE ORAL DAILY
Status: DISCONTINUED | OUTPATIENT
Start: 2024-03-15 | End: 2024-04-01 | Stop reason: HOSPADM

## 2024-03-14 RX ORDER — DIVALPROEX SODIUM 500 MG/1
1000 TABLET, DELAYED RELEASE ORAL
Status: DISCONTINUED | OUTPATIENT
Start: 2024-03-14 | End: 2024-04-01 | Stop reason: HOSPADM

## 2024-03-14 RX ADMIN — B-COMPLEX W/ C & FOLIC ACID TAB 1 TABLET: TAB at 09:25

## 2024-03-14 RX ADMIN — QUETIAPINE FUMARATE 300 MG: 150 TABLET, EXTENDED RELEASE ORAL at 22:12

## 2024-03-14 RX ADMIN — GABAPENTIN 400 MG: 400 CAPSULE ORAL at 09:25

## 2024-03-14 RX ADMIN — GABAPENTIN 400 MG: 400 CAPSULE ORAL at 20:42

## 2024-03-14 RX ADMIN — DIVALPROEX SODIUM 1000 MG: 500 TABLET, DELAYED RELEASE ORAL at 22:12

## 2024-03-14 RX ADMIN — ZONISAMIDE 300 MG: 100 CAPSULE ORAL at 22:13

## 2024-03-14 RX ADMIN — LACOSAMIDE 200 MG: 150 TABLET ORAL at 22:12

## 2024-03-14 RX ADMIN — LISINOPRIL 10 MG: 10 TABLET ORAL at 09:25

## 2024-03-14 RX ADMIN — PRAVASTATIN SODIUM 80 MG: 40 TABLET ORAL at 17:50

## 2024-03-14 RX ADMIN — DIVALPROEX SODIUM 750 MG: 250 TABLET, DELAYED RELEASE ORAL at 09:25

## 2024-03-14 RX ADMIN — INSULIN LISPRO 1 UNITS: 100 INJECTION, SOLUTION INTRAVENOUS; SUBCUTANEOUS at 22:36

## 2024-03-14 RX ADMIN — QUETIAPINE FUMARATE 150 MG: 150 TABLET, EXTENDED RELEASE ORAL at 08:18

## 2024-03-14 RX ADMIN — LEVETIRACETAM 1500 MG: 500 TABLET, FILM COATED ORAL at 09:25

## 2024-03-14 RX ADMIN — TAMSULOSIN HYDROCHLORIDE 0.4 MG: 0.4 CAPSULE ORAL at 17:50

## 2024-03-14 RX ADMIN — LACOSAMIDE 150 MG: 150 TABLET ORAL at 09:25

## 2024-03-14 RX ADMIN — ENOXAPARIN SODIUM 40 MG: 40 INJECTION SUBCUTANEOUS at 09:28

## 2024-03-14 RX ADMIN — INSULIN LISPRO 1 UNITS: 100 INJECTION, SOLUTION INTRAVENOUS; SUBCUTANEOUS at 11:46

## 2024-03-14 RX ADMIN — LORATADINE 10 MG: 10 TABLET ORAL at 09:28

## 2024-03-14 RX ADMIN — GABAPENTIN 400 MG: 400 CAPSULE ORAL at 17:50

## 2024-03-14 RX ADMIN — ACETAMINOPHEN 650 MG: 325 TABLET ORAL at 16:31

## 2024-03-14 RX ADMIN — LEVETIRACETAM 1500 MG: 500 TABLET, FILM COATED ORAL at 20:42

## 2024-03-14 RX ADMIN — LEVOTHYROXINE SODIUM 150 MCG: 75 TABLET ORAL at 09:25

## 2024-03-14 RX ADMIN — QUETIAPINE FUMARATE 150 MG: 150 TABLET, EXTENDED RELEASE ORAL at 15:32

## 2024-03-14 NOTE — PLAN OF CARE
Problem: PAIN - ADULT  Goal: Verbalizes/displays adequate comfort level or baseline comfort level  Description: Interventions:  - Encourage patient to monitor pain and request assistance  - Assess pain using appropriate pain scale  - Administer analgesics based on type and severity of pain and evaluate response  - Implement non-pharmacological measures as appropriate and evaluate response  - Consider cultural and social influences on pain and pain management  - Notify physician/advanced practitioner if interventions unsuccessful or patient reports new pain  Outcome: Progressing     Problem: INFECTION - ADULT  Goal: Absence or prevention of progression during hospitalization  Description: INTERVENTIONS:  - Assess and monitor for signs and symptoms of infection  - Monitor lab/diagnostic results  - Monitor all insertion sites, i.e. indwelling lines, tubes, and drains  - Monitor endotracheal if appropriate and nasal secretions for changes in amount and color  - [place] appropriate cooling/warming therapies per order  - Administer medications as ordered  - Instruct and encourage patient and family to use good hand hygiene technique  - Identify and instruct in appropriate isolation precautions for identified infection/condition  Outcome: Progressing     Problem: Prexisting or High Potential for Compromised Skin Integrity  Goal: Skin integrity is maintained or improved  Description: INTERVENTIONS:  - Identify patients at risk for skin breakdown  - Assess and monitor skin integrity  - Assess and monitor nutrition and hydration status  - Monitor labs   - Assess for incontinence   - Turn and reposition patient  - Assist with mobility/ambulation  - Relieve pressure over bony prominences  - Avoid friction and shearing  - Provide appropriate hygiene as needed including keeping skin clean and dry  - Evaluate need for skin moisturizer/barrier cream  - Collaborate with interdisciplinary team   - Patient/family teaching  -  Consider wound care consult   Outcome: Progressing

## 2024-03-14 NOTE — CASE MANAGEMENT
Case Management Discharge Planning Note    Patient name Jairon Oconnell  Location /-01 MRN [id number]  : 1965 Date 3/14/2024       Current Admission Date: 3/1/2024  Current Admission Diagnosis:Ambulatory dysfunction   Patient Active Problem List    Diagnosis Date Noted    Urinary retention 2024    Nondisplaced fracture of greater trochanter of right femur, subsequent encounter for closed fracture with routine healing 2024    Closed nondisplaced fracture of proximal phalanx of left index finger with routine healing, subsequent encounter 10/30/2023    Pressure ulcer of sacral region, stage 3 (HCC) 2023    Ambulatory dysfunction 2022    Social problem 2021    Hypomagnesemia 2021    Thrombocytopathia ([facility]) 2020    Hyponatremia 2019    Metabolic encephalopathy 2019    Seborrheic dermatitis of scalp 2019    Nearsightedness 2019    Behavior concern in adult 2019    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HCC) 2019    Vitamin B12 deficiency 2017    Mixed hyperlipidemia 2016    Vitamin D deficiency 2016    Type 2 diabetes mellitus with diabetic neuropathy, without long-term current use of insulin ([facility]) 06/15/2016    Acquired hypothyroidism 06/15/2016    Cataract 2013    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 [place] [place] [facility] [address] [address]  Phone:  [phone] Fax: [phone]    Primary Care Provider: HUNTER Brown    Primary Insurance: MEDICARE  Secondary Insurance: [place] MEDICAL [employer]    DISCHARGE DETAILS:    Discharge planning discussed with:: patient & Madeline (lani at [facility])  Freedom of Choice: Yes  Comments - Freedom of Choice: lani has expanded the search for snf rehab 98  referrals - cm was offered a bed by [facility] & then they declined - I made Madeline aware of this information - she stated they are working  resp[facility] while they find a home for him to have  apermanent placement with a former caer giver-   Madeline did bring the pt's depakote brand name from his pharmacy - medications were placed in the pharmacy  CM contacted family/caregiver?: Yes             Contacts  Patient Contacts: Madeline Arvizu  Relationship to Patient:: Other (Comment)  Contact Method: In Person  Reason/Outcome: Discharge Planning    Requested Home Health Care         Is the patient interested in HHC at discharge?: No    DME Referral Provided  Referral made for DME?: No              Treatment Team Recommendation:  (d/c plan tbd- tbd)

## 2024-03-14 NOTE — PROGRESS NOTES
[facility]  Progress Note  Name: Jairon Oconnell I  MRN: [id number]  Unit/Bed#: -01 I Date of Admission: 3/1/2024   Date of Service: 3/14/2024 I Hospital Day: 13    Assessment/Plan   * Ambulatory dysfunction  Assessment & Plan  With non-operative right hip greater trochanter fracture seen and evaluated by orthopedic surgery on the previous admission  He was discharged to a skilled nursing facility, became agitated, broke a chair, and was reported to become aggressive with staff.  The facility will now not take him back  Psychiatry recommendations appreciated. No indication for inpatient psychiatric hospitalization at this time.  Recommendation is rehab/SNF  PT/OT recommendations appreciated.  Medically clear for discharge to rehab facility   Case management following, input appreciated            Urinary retention  Assessment & Plan  S/p chapman catheter, since discontinued   Continue tamsulosin  Continue urinary retention protocol            Pressure ulcer of sacral region, stage 3 (HCC)  Assessment & Plan  POA  Wound care recommendations appreciated: Scarring to the left lateral buttock from stage 3 pressure injury in the past. Scarring is intact and blanchable and hyperpigmented   Turn and reposition every 2 hours, frequent offloading           Behavior concern in adult  Assessment & Plan  He has been cooperative without any agitation or behavioral issues   Seroquel XR decreased to 150 BID and 300 HS due to excessive sleepiness           Acquired hypothyroidism  Assessment & Plan  Continue levothyroxine 150 mcg p.o. every morning           Essential hypertension  Assessment & Plan  Continue lisinopril 10 mg p.o. daily  Monitor blood pressure            Mixed hyperlipidemia  Assessment & Plan  Continue Pravachol 80 mg oral daily at bedtime            Generalized convulsive epilepsy (HCC)  Assessment & Plan  Continue Depakote 1000 mg p.o. nightly and 750 mg p.o. daily, (per support  coordinator must be brand name) Neurontin 400 mg p.o. 3 times daily, Vimpat 150 mg p.o. daily and 200 mg p.o. nightly, zonisamide 300 mg HS, and Keppra 1500 mg p.o. every 12 hours  Continue seizure precautions           Type 2 diabetes mellitus with diabetic neuropathy, without long-term current use of insulin (HCC)  Assessment & Plan  Lab Results   Component Value Date    HGBA1C 5.5 11/27/2023       Recent Labs     03/13/24  1124 03/13/24  1613 03/13/24  2038 03/14/24  0756   POCGLU 140 139 161* 121       Blood Sugar Average: Last 72 hrs:  (P) [id number]    Continue diabetic diet       Continue fingerstick blood sugar with sliding scale coverage  Hypoglycemia protocol    Ataxic cerebral palsy (HCC)  Assessment & Plan  Continue supportive care   Continue with current medications                     VTE Pharmacologic Prophylaxis: VTE Score: 3 Moderate Risk (Score 3-4) - Pharmacological DVT Prophylaxis Ordered: enoxaparin (Lovenox).    Mobility:   Basic Mobility Inpatient Raw Score: 17  JH-HLM Goal: 5: Stand one or more mins  JH-HLM Achieved: 6: Walk 10 steps or more  HLM Goal NOT achieved. Continue with multidisciplinary rounding and encourage appropriate mobility to improve upon HLM goals.    Patient Centered Rounds: I performed bedside rounds with nursing staff today.   Discussions with Specialists or Other Care Team Provider: CM, OT/PT     Education and Discussions with Family / Patient:  patient was updated.     Total Time Spent on Date of Encounter in care of patient: 23 mins. This time was spent on one or more of the following: performing physical exam; counseling and coordination of care; obtaining or reviewing history; documenting in the medical record; reviewing/ordering tests, medications or procedures; communicating with other healthcare professionals and discussing with patient's family/caregivers.    Current Length of Stay: 13 day(s)  Current Patient Status: Inpatient   Certification Statement:  The patient will continue to require additional inpatient hospital stay due to ambulatory gait dysfunction  Discharge Plan: Anticipate discharge in >72 hrs to rehab facility.CM is in the process of securing a facility for discharge.     Code Status: Level 1 - Full Code    Subjective:   The patient was seen and examined.  No acute event overnight. His group home will have the pharmacy deliver brand name of Depakote. Denies any pain.     Objective:     Vitals:   Temp (24hrs), Av.1 °F (36.2 °C), Min:96.9 °F (36.1 °C), Max:97.2 °F (36.2 °C)    Temp:  [96.9 °F (36.1 °C)-97.2 °F (36.2 °C)] 97.2 °F (36.2 °C)  HR:  [88] 88  Resp:  [18] 18  BP: (125-134)/(79-89) 132/83  SpO2:  [95 %] 95 %  Body mass index is 19.88 kg/m².     Input and Output Summary (last 24 hours):     Intake/Output Summary (Last 24 hours) at 3/14/2024 0804  Last data filed at 3/13/2024 1700  Gross per 24 hour   Intake 240 ml   Output 700 ml   Net -460 ml         Physical Exam:   Physical Exam  Vitals and nursing note reviewed.   Constitutional:       General: He is not in acute distress.     Appearance: Normal appearance.      Comments: Frail and appearing older than age   HENT:      Head: Normocephalic and atraumatic.      Right Ear: External ear normal.      Left Ear: External ear normal.      Nose: Nose normal.      Mouth/Throat:      Mouth: Mucous membranes are moist.      Pharynx: Oropharynx is clear.   Eyes:      General:         Right eye: No discharge.         Left eye: No discharge.      Extraocular Movements: Extraocular movements intact.      Pupils: Pupils are equal, round, and reactive to light.   Cardiovascular:      Rate and Rhythm: Normal rate and regular rhythm.      Pulses: Normal pulses.      Heart sounds: Normal heart sounds. No murmur heard.  Pulmonary:      Effort: Pulmonary effort is normal. No respiratory distress.      Breath sounds: Normal breath sounds. No wheezing or rales.   Abdominal:      General: Bowel sounds are normal.  There is no distension.      Palpations: Abdomen is soft. There is no mass.      Tenderness: There is no abdominal tenderness.   Musculoskeletal:         General: No swelling, tenderness or deformity. Normal range of motion.      Cervical back: Normal range of motion and neck supple. No rigidity.   Skin:     General: Skin is warm and dry.      Capillary Refill: Capillary refill takes less than 2 seconds.      Coloration: Skin is not pale.      Findings: No erythema.   Neurological:      Mental Status: He is alert. Mental status is at baseline.      Comments: With periods of forgetfulness/confusion. Upper extremities tremors and ataxia. At baseline.    Psychiatric:         Attention and Perception: Attention normal.         Mood and Affect: Affect is flat.         Behavior: Behavior normal. Behavior is cooperative.         Cognition and Memory: Cognition is impaired.       Additional Data:     Labs:  Results from last 7 days   Lab Units 03/11/24  0519   WBC Thousand/uL 7.35   HEMOGLOBIN g/dL 13.0   HEMATOCRIT % 40.1   PLATELETS Thousands/uL 139*       Results from last 7 days   Lab Units 03/11/24  0519   SODIUM mmol/L 139   POTASSIUM mmol/L 4.1   CHLORIDE mmol/L 104   CO2 mmol/L 25   BUN mg/dL 34*   CREATININE mg/dL 0.72   ANION GAP mmol/L 10   CALCIUM mg/dL 9.7   GLUCOSE RANDOM mg/dL 108           Results from last 7 days   Lab Units 03/14/24  0756 03/13/24  2038 03/13/24  1613 03/13/24  1124 03/13/24  0721 03/12/24  2118 03/12/24  1608 03/12/24  1108 03/12/24  0737 03/11/24  2024 03/11/24  1618 03/11/24  1116   POC GLUCOSE mg/dl 121 161* 139 140 118 201* 172* 117 119 207* 95 131               Lines/Drains:  Invasive Devices       None                 Urinary Catheter:  Goal for removal: Voiding trial when ambulation improves               Imaging: Reviewed radiology reports from this admission including: CT head and xray(s)    Recent Cultures (last 7 days):         Last 24 Hours Medication List:   Current  Facility-Administered Medications   Medication Dose Route Frequency Provider Last Rate    acetaminophen  650 mg Oral 4x Daily PRN HUNTER Lopez      acetaminophen  975 mg Oral Q12H HUNTER Lopez      divalproex sodium  1,000 mg Oral HS Hasmukh Becker, JAIR      divalproex sodium  750 mg Oral Daily Hasmukh Becker, JAIR      docusate sodium  100 mg Oral BID PRN Hasmukh Becker, JAIR      enoxaparin  40 mg Subcutaneous Daily Hasmukh Becker, JAIR      gabapentin  400 mg Oral TID Hasmukh Becker, JAIR      ibuprofen  600 mg Oral Q6H PRN Ancelmolillian Mtoa, JAIR      insulin lispro  1-5 Units Subcutaneous TID AC Hasmukh Becker, PA-DENIA      insulin lispro  1-6 Units Subcutaneous HS Hasmukh Becker, JAIR      lacosamide  150 mg Oral Daily Hasmukh Becker, JAIR      lacosamide  200 mg Oral HS Hasmukh Becker, JAIR      levETIRAcetam  1,500 mg Oral Q12H RONALD Hasmukh Becker, JAIR      levothyroxine  150 mcg Oral Daily Hasmukh Becker, JAIR      lisinopril  10 mg Oral Daily Hasmukh Becker, AJIR      loratadine  10 mg Oral Daily Hasmukh Becker, JAIR      multivitamin stress formula  1 tablet Oral Daily Hasmukh Becker, JAIR      OLANZapine  5 mg Intramuscular Q3H PRN Hasmukh Becker, JAIR      pravastatin  80 mg Oral Daily With Dinner Hasmukh Becker PA-C      QUEtiapine  150 mg Oral BID HUNTER Lopez      QUEtiapine  300 mg Oral HS HUNTER Lopez      tamsulosin  0.4 mg Oral Daily With Dinner HUNTER Lopez      temazepam  15 mg Oral HS PRN Hasmukh Becker, JAIR      zonisamide  300 mg Oral HS Hasmukh Becker PA-C          Today, Patient Was Seen By: HUNTER Lopez    **Please Note: This note may have been constructed using a voice recognition system.**

## 2024-03-15 LAB
GLUCOSE SERPL-MCNC: 106 MG/DL (ref 65–140)
GLUCOSE SERPL-MCNC: 121 MG/DL (ref 65–140)
GLUCOSE SERPL-MCNC: 169 MG/DL (ref 65–140)
GLUCOSE SERPL-MCNC: 177 MG/DL (ref 65–140)

## 2024-03-15 PROCEDURE — 82948 REAGENT STRIP/BLOOD GLUCOSE: CPT

## 2024-03-15 PROCEDURE — 99233 SBSQ HOSP IP/OBS HIGH 50: CPT | Performed by: NURSE PRACTITIONER

## 2024-03-15 RX ADMIN — ENOXAPARIN SODIUM 40 MG: 40 INJECTION SUBCUTANEOUS at 08:56

## 2024-03-15 RX ADMIN — LEVETIRACETAM 1500 MG: 500 TABLET, FILM COATED ORAL at 08:54

## 2024-03-15 RX ADMIN — TAMSULOSIN HYDROCHLORIDE 0.4 MG: 0.4 CAPSULE ORAL at 16:53

## 2024-03-15 RX ADMIN — GABAPENTIN 400 MG: 400 CAPSULE ORAL at 08:55

## 2024-03-15 RX ADMIN — DIVALPROEX SODIUM 1000 MG: 500 TABLET, DELAYED RELEASE ORAL at 22:38

## 2024-03-15 RX ADMIN — IBUPROFEN 600 MG: 600 TABLET, FILM COATED ORAL at 09:40

## 2024-03-15 RX ADMIN — LISINOPRIL 10 MG: 10 TABLET ORAL at 08:55

## 2024-03-15 RX ADMIN — GABAPENTIN 400 MG: 400 CAPSULE ORAL at 16:53

## 2024-03-15 RX ADMIN — LACOSAMIDE 200 MG: 150 TABLET ORAL at 22:38

## 2024-03-15 RX ADMIN — DIVALPROEX SODIUM 250 MG: 250 TABLET, FILM COATED, EXTENDED RELEASE ORAL at 09:00

## 2024-03-15 RX ADMIN — GABAPENTIN 400 MG: 400 CAPSULE ORAL at 22:37

## 2024-03-15 RX ADMIN — LORATADINE 10 MG: 10 TABLET ORAL at 08:55

## 2024-03-15 RX ADMIN — INSULIN LISPRO 1 UNITS: 100 INJECTION, SOLUTION INTRAVENOUS; SUBCUTANEOUS at 11:57

## 2024-03-15 RX ADMIN — LEVETIRACETAM 1500 MG: 500 TABLET, FILM COATED ORAL at 22:38

## 2024-03-15 RX ADMIN — LACOSAMIDE 150 MG: 150 TABLET ORAL at 08:56

## 2024-03-15 RX ADMIN — PRAVASTATIN SODIUM 80 MG: 40 TABLET ORAL at 16:53

## 2024-03-15 RX ADMIN — DIVALPROEX SODIUM 500 MG: 500 TABLET, DELAYED RELEASE ORAL at 09:00

## 2024-03-15 RX ADMIN — ACETAMINOPHEN 650 MG: 325 TABLET ORAL at 22:37

## 2024-03-15 RX ADMIN — TEMAZEPAM 15 MG: 7.5 CAPSULE ORAL at 22:37

## 2024-03-15 RX ADMIN — QUETIAPINE FUMARATE 150 MG: 150 TABLET, EXTENDED RELEASE ORAL at 08:55

## 2024-03-15 RX ADMIN — QUETIAPINE FUMARATE 300 MG: 150 TABLET, EXTENDED RELEASE ORAL at 22:37

## 2024-03-15 RX ADMIN — LEVOTHYROXINE SODIUM 150 MCG: 75 TABLET ORAL at 08:55

## 2024-03-15 RX ADMIN — ZONISAMIDE 300 MG: 100 CAPSULE ORAL at 22:37

## 2024-03-15 RX ADMIN — QUETIAPINE FUMARATE 150 MG: 150 TABLET, EXTENDED RELEASE ORAL at 13:57

## 2024-03-15 RX ADMIN — B-COMPLEX W/ C & FOLIC ACID TAB 1 TABLET: TAB at 08:56

## 2024-03-15 RX ADMIN — INSULIN LISPRO 1 UNITS: 100 INJECTION, SOLUTION INTRAVENOUS; SUBCUTANEOUS at 22:41

## 2024-03-15 NOTE — PLAN OF CARE
Problem: PAIN - ADULT  Goal: Verbalizes/displays adequate comfort level or baseline comfort level  Description: Interventions:  - Encourage patient to monitor pain and request assistance  - Assess pain using appropriate pain scale  - Administer analgesics based on type and severity of pain and evaluate response  - Implement non-pharmacological measures as appropriate and evaluate response  - Consider cultural and social influences on pain and pain management  - Notify physician/advanced practitioner if interventions unsuccessful or patient reports new pain  Outcome: Progressing     Problem: INFECTION - ADULT  Goal: Absence or prevention of progression during hospitalization  Description: INTERVENTIONS:  - Assess and monitor for signs and symptoms of infection  - Monitor lab/diagnostic results  - Monitor all insertion sites, i.e. indwelling lines, tubes, and drains  - Monitor endotracheal if appropriate and nasal secretions for changes in amount and color  - [place] appropriate cooling/warming therapies per order  - Administer medications as ordered  - Instruct and encourage patient and family to use good hand hygiene technique  - Identify and instruct in appropriate isolation precautions for identified infection/condition  Outcome: Progressing  Goal: Absence of fever/infection during neutropenic period  Description: INTERVENTIONS:  - Monitor WBC    Outcome: Progressing     Problem: SAFETY ADULT  Goal: Patient will remain free of falls  Description: INTERVENTIONS:  - Educate patient/family on patient safety including physical limitations  - Instruct patient to call for assistance with activity   - Consult OT/PT to assist with strengthening/mobility   - Keep Call bell within reach  - Keep bed low and locked with side rails adjusted as appropriate  - Keep care items and personal belongings within reach  - Initiate and maintain comfort rounds  - Make Fall Risk Sign visible to staff  - Offer Toileting every  Hours,  in advance of need  - Initiate/Maintain alarm  - Obtain necessary fall risk management equipment:   - Apply yellow socks and bracelet for high fall risk patients  - Consider moving patient to room near nurses station  Outcome: Progressing  Goal: Maintain or return to baseline ADL function  Description: INTERVENTIONS:  -  Assess patient's ability to carry out ADLs; assess patient's baseline for ADL function and identify physical deficits which impact ability to perform ADLs (bathing, care of mouth/teeth, toileting, grooming, dressing, etc.)  - Assess/evaluate cause of self-care deficits   - Assess range of motion  - Assess patient's mobility; develop plan if impaired  - Assess patient's need for assistive devices and provide as appropriate  - Encourage maximum independence but intervene and supervise when necessary  - Involve family in performance of ADLs  - Assess for home care needs following discharge   - Consider OT consult to assist with ADL evaluation and planning for discharge  - Provide patient education as appropriate  Outcome: Progressing  Goal: Maintains/Returns to pre admission functional level  Description: INTERVENTIONS:  - Perform AM-PAC 6 Click Basic Mobility/ Daily Activity assessment daily.  - Set and communicate daily mobility goal to care team and patient/family/caregiver.   - Collaborate with rehabilitation services on mobility goals if consulted  - Perform Range of Motion  times a day.  - Reposition patient every  hours.  - Dangle patient  times a day  - Stand patient  times a day  - Ambulate patient  times a day  - Out of bed to chair  times a day   - Out of bed for meals times a day  - Out of bed for toileting  - Record patient progress and toleration of activity level   Outcome: Progressing     Problem: DISCHARGE PLANNING  Goal: Discharge to home or other facility with appropriate resources  Description: INTERVENTIONS:  - Identify barriers to discharge w/patient and caregiver  - Arrange for  needed discharge resources and transportation as appropriate  - Identify discharge learning needs (meds, wound care, etc.)  - Arrange for interpretive services to assist at discharge as needed  - Refer to Case Management Department for coordinating discharge planning if the patient needs post-hospital services based on physician/advanced practitioner order or complex needs related to functional status, cognitive ability, or social support system  Outcome: Progressing     Problem: Knowledge Deficit  Goal: Patient/family/caregiver demonstrates understanding of disease process, treatment plan, medications, and discharge instructions  Description: Complete learning assessment and assess knowledge base.  Interventions:  - Provide teaching at level of understanding  - Provide teaching via preferred learning methods  Outcome: Progressing     Problem: Prexisting or High Potential for Compromised Skin Integrity  Goal: Skin integrity is maintained or improved  Description: INTERVENTIONS:  - Identify patients at risk for skin breakdown  - Assess and monitor skin integrity  - Assess and monitor nutrition and hydration status  - Monitor labs   - Assess for incontinence   - Turn and reposition patient  - Assist with mobility/ambulation  - Relieve pressure over bony prominences  - Avoid friction and shearing  - Provide appropriate hygiene as needed including keeping skin clean and dry  - Evaluate need for skin moisturizer/barrier cream  - Collaborate with interdisciplinary team   - Patient/family teaching  - Consider wound care consult   Outcome: Progressing

## 2024-03-15 NOTE — PLAN OF CARE

## 2024-03-15 NOTE — PROGRESS NOTES
[facility]  Progress Note  Name: Jairon Oconnell I  MRN: [id number]  Unit/Bed#: -01 I Date of Admission: 3/1/2024   Date of Service: 3/15/2024 I Hospital Day: 14    Assessment/Plan     * Ambulatory dysfunction  Assessment & Plan  With non-operative right hip greater trochanter fracture seen and evaluated by orthopedic surgery on the previous admission  He was discharged to a skilled nursing facility, became agitated, broke a chair, and was reported to become aggressive with staff.  The facility will now not take him back  Psychiatry recommendations appreciated. No indication for inpatient psychiatric hospitalization at this time.  Recommendation is rehab/SNF  PT/OT recommendations appreciated.  Medically clear for discharge to rehab facility   Case management following, input appreciated            Generalized convulsive epilepsy (HCC)  Assessment & Plan  Continue Depakote 1000 mg p.o. nightly and 750 mg p.o. daily, (per  must be brand name) Neurontin 400 mg p.o. 3 times daily, Vimpat 150 mg p.o. daily and 200 mg p.o. nightly, zonisamide 300 mg HS, and Keppra 1500 mg p.o. every 12 hours  Continue seizure precautions           Type 2 diabetes mellitus with diabetic neuropathy, without long-term current use of insulin (HCC)  Assessment & Plan  Lab Results   Component Value Date    HGBA1C 5.5 11/27/2023       Recent Labs     03/13/24  1124 03/13/24  1613 03/13/24  2038 03/14/24  0756   POCGLU 140 139 161* 121       Blood Sugar Average: Last 72 hrs:  (P) 143.7400770223689346    Continue diabetic diet       Continue fingerstick blood sugar with sliding scale coverage  Hypoglycemia protocol    Urinary retention  Assessment & Plan  S/p chapman catheter, since discontinued   Continue tamsulosin  Continue urinary retention protocol            Pressure ulcer of sacral region, stage 3 (HCC)  Assessment & Plan  POA  Wound care recommendations appreciated: Scarring to the left  lateral buttock from stage 3 pressure injury in the past. Scarring is intact and blanchable and hyperpigmented   Turn and reposition every 2 hours, frequent offloading           Behavior concern in adult  Assessment & Plan  He has been cooperative without any agitation or behavioral issues   Seroquel XR decreased to 150 BID and 300 HS due to excessive sleepiness           Acquired hypothyroidism  Assessment & Plan  Continue levothyroxine 150 mcg p.o. every morning           Essential hypertension  Assessment & Plan  Continue lisinopril 10 mg p.o. daily  Monitor blood pressure            Mixed hyperlipidemia  Assessment & Plan  Continue Pravachol 80 mg oral daily at bedtime            Ataxic cerebral palsy (HCC)  Assessment & Plan  Continue supportive care   Continue with current medications                 VTE Pharmacologic Prophylaxis: VTE Score: 3 Moderate Risk (Score 3-4) - Pharmacological DVT Prophylaxis Ordered: enoxaparin (Lovenox).    Mobility:   Basic Mobility Inpatient Raw Score: 17  JH-HLM Goal: 5: Stand one or more mins  JH-HLM Achieved: 6: Walk 10 steps or more  JH-HLM Goal achieved. Continue to encourage appropriate mobility.    Patient Centered Rounds: I performed bedside rounds with nursing staff today.   Discussions with Specialists or Other Care Team Provider: CM    Education and Discussions with Family / Patient: Updated  (sister) at bedside.    Total Time Spent on Date of Encounter in care of patient: 26 mins. This time was spent on one or more of the following: performing physical exam; counseling and coordination of care; obtaining or reviewing history; documenting in the medical record; reviewing/ordering tests, medications or procedures; communicating with other healthcare professionals and discussing with patient's family/caregivers.    Current Length of Stay: 14 day(s)  Current Patient Status: Inpatient   Certification Statement: The patient will continue to require additional  inpatient hospital stay due to care coordination.   Discharge Plan: Anticipate discharge in 24-48 hrs to rehab facility.    Code Status: Level 1 - Full Code    Subjective:   Patient seen and examined.  He denies pain, discomfort, or nausea.     Objective:     Vitals:   Temp (24hrs), Av.8 °F (36.6 °C), Min:97.8 °F (36.6 °C), Max:97.8 °F (36.6 °C)    Temp:  [97.8 °F (36.6 °C)] 97.8 °F (36.6 °C)  HR:  [87] 87  Resp:  [16] 16  BP: (139)/(75-83) 139/83  SpO2:  [94 %] 94 %  Body mass index is 21.29 kg/m².     Input and Output Summary (last 24 hours):     Intake/Output Summary (Last 24 hours) at 3/15/2024 0848  Last data filed at 3/15/2024 0330  Gross per 24 hour   Intake 600 ml   Output 300 ml   Net 300 ml       Physical Exam:   Physical Exam  Vitals and nursing note reviewed.   HENT:      Head: Normocephalic and atraumatic.      Nose: Nose normal.      Mouth/Throat:      Mouth: Mucous membranes are moist.      Pharynx: Oropharynx is clear.   Eyes:      Pupils: Pupils are equal, round, and reactive to light.   Cardiovascular:      Rate and Rhythm: Normal rate and regular rhythm.      Pulses: Normal pulses.      Heart sounds: Normal heart sounds.   Pulmonary:      Effort: Pulmonary effort is normal. No respiratory distress.      Breath sounds: Normal breath sounds.   Abdominal:      General: Bowel sounds are normal.      Palpations: Abdomen is soft.      Tenderness: There is no abdominal tenderness.   Musculoskeletal:      Cervical back: Neck supple.      Right lower leg: No edema.      Left lower leg: No edema.   Skin:     General: Skin is warm and dry.      Capillary Refill: Capillary refill takes less than 2 seconds.   Neurological:      General: No focal deficit present.      Mental Status: He is alert. Mental status is at baseline.       Additional Data:     Labs:  Results from last 7 days   Lab Units 24  0519   WBC Thousand/uL 7.35   HEMOGLOBIN g/dL 13.0   HEMATOCRIT % 40.1   PLATELETS Thousands/uL 139*      Results from last 7 days   Lab Units 03/11/24  0519   SODIUM mmol/L 139   POTASSIUM mmol/L 4.1   CHLORIDE mmol/L 104   CO2 mmol/L 25   BUN mg/dL 34*   CREATININE mg/dL 0.72   ANION GAP mmol/L 10   CALCIUM mg/dL 9.7   GLUCOSE RANDOM mg/dL 108         Results from last 7 days   Lab Units 03/15/24  0738 03/14/24  2216 03/14/24  1630 03/14/24  1127 03/14/24  0756 03/13/24  2038 03/13/24  1613 03/13/24  1124 03/13/24  0721 03/12/24  2118 03/12/24  1608 03/12/24  1108   POC GLUCOSE mg/dl 106 172* 130 162* 121 161* 139 140 118 201* 172* 117               Lines/Drains:  Invasive Devices       None                   Last 24 Hours Medication List:   Current Facility-Administered Medications   Medication Dose Route Frequency Provider Last Rate    acetaminophen  650 mg Oral 4x Daily PRN Lizzie Jimi, CRNP      divalproex sodium  250 mg Oral Daily Lizzie Jimi, CRNP      divalproex sodium  1,000 mg Oral HS Lizzie Jimi, CRNP      divalproex sodium  500 mg Oral Daily Lizzie NATHEN KrauseNP      docusate sodium  100 mg Oral BID PRN Hasmukh Becker PA-C      enoxaparin  40 mg Subcutaneous Daily Hasmukh Becker PA-C      gabapentin  400 mg Oral TID Hasmukh Becker PA-C      ibuprofen  600 mg Oral Q6H PRN Ancelmo Mota PA-C      insulin lispro  1-5 Units Subcutaneous TID AC Hasmukh Becker PA-C      insulin lispro  1-6 Units Subcutaneous HS Hasmukh Becker PA-C      lacosamide  150 mg Oral Daily Hasmukh Becker PA-C      lacosamide  200 mg Oral HS Hasmukh Becker PA-C      levETIRAcetam  1,500 mg Oral Q12H [facility] Hasmukh Becker PA-C      levothyroxine  150 mcg Oral Daily Hasmukh Becker PA-C      lisinopril  10 mg Oral Daily Hasmukh Becker PA-C      loratadine  10 mg Oral Daily Hasmukh Becker PA-C      multivitamin stress formula  1 tablet Oral Daily Hasmukh Becker PA-C      OLANZapine  5 mg Intramuscular Q3H PRN Hasmukh Becker PA-C      pravastatin  80 mg Oral Daily With Dinner Hasmukh Becker PA-C      QUEtiapine  150 mg Oral BID  HUNTER Loepz      QUEtiapine  300 mg Oral HS HUNTER Lopez      tamsulosin  0.4 mg Oral Daily With Dinner HUNTER Lopez      temazepam  15 mg Oral HS PRN Hasmukh Becker PA-C      zonisamide  300 mg Oral HS Hasmukh Becker PA-C          Today, Patient Was Seen By: HUNTER Main    **Please Note: This note may have been constructed using a voice recognition system.**

## 2024-03-16 LAB
GLUCOSE SERPL-MCNC: 107 MG/DL (ref 65–140)
GLUCOSE SERPL-MCNC: 146 MG/DL (ref 65–140)
GLUCOSE SERPL-MCNC: 146 MG/DL (ref 65–140)
GLUCOSE SERPL-MCNC: 171 MG/DL (ref 65–140)
GLUCOSE SERPL-MCNC: 190 MG/DL (ref 65–140)

## 2024-03-16 PROCEDURE — 99233 SBSQ HOSP IP/OBS HIGH 50: CPT | Performed by: NURSE PRACTITIONER

## 2024-03-16 PROCEDURE — 82948 REAGENT STRIP/BLOOD GLUCOSE: CPT

## 2024-03-16 RX ADMIN — TAMSULOSIN HYDROCHLORIDE 0.4 MG: 0.4 CAPSULE ORAL at 17:11

## 2024-03-16 RX ADMIN — IBUPROFEN 600 MG: 600 TABLET, FILM COATED ORAL at 09:07

## 2024-03-16 RX ADMIN — PRAVASTATIN SODIUM 80 MG: 40 TABLET ORAL at 17:11

## 2024-03-16 RX ADMIN — GABAPENTIN 400 MG: 400 CAPSULE ORAL at 22:59

## 2024-03-16 RX ADMIN — LORATADINE 10 MG: 10 TABLET ORAL at 09:09

## 2024-03-16 RX ADMIN — QUETIAPINE FUMARATE 150 MG: 150 TABLET, EXTENDED RELEASE ORAL at 10:46

## 2024-03-16 RX ADMIN — LACOSAMIDE 200 MG: 150 TABLET ORAL at 22:59

## 2024-03-16 RX ADMIN — LEVETIRACETAM 1500 MG: 500 TABLET, FILM COATED ORAL at 09:10

## 2024-03-16 RX ADMIN — DIVALPROEX SODIUM 500 MG: 500 TABLET, DELAYED RELEASE ORAL at 09:13

## 2024-03-16 RX ADMIN — DIVALPROEX SODIUM 250 MG: 250 TABLET, FILM COATED, EXTENDED RELEASE ORAL at 09:13

## 2024-03-16 RX ADMIN — LISINOPRIL 10 MG: 10 TABLET ORAL at 09:07

## 2024-03-16 RX ADMIN — B-COMPLEX W/ C & FOLIC ACID TAB 1 TABLET: TAB at 09:06

## 2024-03-16 RX ADMIN — GABAPENTIN 400 MG: 400 CAPSULE ORAL at 09:08

## 2024-03-16 RX ADMIN — LEVETIRACETAM 1500 MG: 500 TABLET, FILM COATED ORAL at 22:59

## 2024-03-16 RX ADMIN — QUETIAPINE FUMARATE 300 MG: 150 TABLET, EXTENDED RELEASE ORAL at 22:59

## 2024-03-16 RX ADMIN — GABAPENTIN 400 MG: 400 CAPSULE ORAL at 17:11

## 2024-03-16 RX ADMIN — INSULIN LISPRO 1 UNITS: 100 INJECTION, SOLUTION INTRAVENOUS; SUBCUTANEOUS at 12:02

## 2024-03-16 RX ADMIN — LACOSAMIDE 150 MG: 150 TABLET ORAL at 09:05

## 2024-03-16 RX ADMIN — QUETIAPINE FUMARATE 150 MG: 150 TABLET, EXTENDED RELEASE ORAL at 15:08

## 2024-03-16 RX ADMIN — DIVALPROEX SODIUM 1000 MG: 500 TABLET, DELAYED RELEASE ORAL at 23:00

## 2024-03-16 RX ADMIN — INSULIN LISPRO 1 UNITS: 100 INJECTION, SOLUTION INTRAVENOUS; SUBCUTANEOUS at 23:00

## 2024-03-16 RX ADMIN — ZONISAMIDE 300 MG: 100 CAPSULE ORAL at 22:59

## 2024-03-16 RX ADMIN — LEVOTHYROXINE SODIUM 150 MCG: 75 TABLET ORAL at 09:07

## 2024-03-16 NOTE — PROGRESS NOTES
[facility]  Progress Note  Name: Jairon Oconnell I  MRN: [id number]  Unit/Bed#: -01 I Date of Admission: 3/1/2024   Date of Service: 3/16/2024 I Hospital Day: 15    Assessment/Plan     * Ambulatory dysfunction  Assessment & Plan  With non-operative right hip greater trochanter fracture seen and evaluated by orthopedic surgery on the previous admission  He was discharged to a skilled nursing facility, became agitated, broke a chair, and was reported to become aggressive with staff.  The facility will now not take him back  Psychiatry recommendations appreciated. No indication for inpatient psychiatric hospitalization at this time.  Recommendation is rehab/SNF  PT/OT recommendations appreciated.  Medically clear for discharge to rehab facility   Case management following, input appreciated            Generalized convulsive epilepsy (HCC)  Assessment & Plan  Continue Depakote 1000 mg p.o. nightly and 750 mg p.o. daily, (per  must be brand name) Neurontin 400 mg p.o. 3 times daily, Vimpat 150 mg p.o. daily and 200 mg p.o. nightly, zonisamide 300 mg HS, and Keppra 1500 mg p.o. every 12 hours  Continue seizure precautions           Type 2 diabetes mellitus with diabetic neuropathy, without long-term current use of insulin (HCC)  Assessment & Plan  Lab Results   Component Value Date    HGBA1C 5.5 11/27/2023       Recent Labs     03/15/24  1616 03/15/24  2100 03/16/24  0746 03/16/24  1112   POCGLU 121 177* 107 190*         Blood Sugar Average: Last 72 hrs:  (P) 143.7835217693360938    Continue diabetic diet       Continue fingerstick blood sugar with sliding scale coverage  Hypoglycemia protocol    Urinary retention  Assessment & Plan  S/p chapman catheter, since discontinued   Continue tamsulosin  Continue urinary retention protocol            Pressure ulcer of sacral region, stage 3 (HCC)  Assessment & Plan  POA  Wound care recommendations appreciated: Scarring to the left  lateral buttock from stage 3 pressure injury in the past. Scarring is intact and blanchable and hyperpigmented   Turn and reposition every 2 hours, frequent offloading           Behavior concern in adult  Assessment & Plan  He has been cooperative without any agitation or behavioral issues   Seroquel XR decreased to 150 BID and 300 HS due to excessive sleepiness           Acquired hypothyroidism  Assessment & Plan  Continue levothyroxine 150 mcg p.o. every morning           Essential hypertension  Assessment & Plan  Continue lisinopril 10 mg p.o. daily  Monitor blood pressure            Mixed hyperlipidemia  Assessment & Plan  Continue Pravachol 80 mg oral daily at bedtime            Ataxic cerebral palsy (HCC)  Assessment & Plan  Continue supportive care   Continue with current medications               VTE Pharmacologic Prophylaxis: VTE Score: 3 Moderate Risk (Score 3-4) - Pharmacological DVT Prophylaxis Ordered: enoxaparin (Lovenox).    Mobility:   Basic Mobility Inpatient Raw Score: 17  JH-HLM Goal: 5: Stand one or more mins  JH-HLM Achieved: 6: Walk 10 steps or more  JH-HLM Goal achieved. Continue to encourage appropriate mobility.    Patient Centered Rounds: I performed bedside rounds with nursing staff today.   Discussions with Specialists or Other Care Team Provider: CM    Education and Discussions with Family / Patient:  No medical updates to provide at this time..     Total Time Spent on Date of Encounter in care of patient: 30 mins. This time was spent on one or more of the following: performing physical exam; counseling and coordination of care; obtaining or reviewing history; documenting in the medical record; reviewing/ordering tests, medications or procedures; communicating with other healthcare professionals and discussing with patient's family/caregivers.    Current Length of Stay: 15 day(s)  Current Patient Status: Inpatient   Certification Statement: The patient will continue to require additional  inpatient hospital stay due to care coordination.   Discharge Plan:  TBD.     Code Status: Level 1 - Full Code    Subjective:   Patient seen and examined.  He denies pain or discomfort.  He is enjoying his breakfast of pancakes and syrup.     Objective:     Vitals:   Temp (24hrs), Av.8 °F (36.6 °C), Min:97.6 °F (36.4 °C), Max:97.9 °F (36.6 °C)    Temp:  [97.6 °F (36.4 °C)-97.9 °F (36.6 °C)] 97.9 °F (36.6 °C)  HR:  [67-84] 67  Resp:  [18] 18  BP: (130-139)/(70-83) 135/83  Body mass index is 21.29 kg/m².     Input and Output Summary (last 24 hours):     Intake/Output Summary (Last 24 hours) at 3/16/2024 1434  Last data filed at 3/16/2024 1300  Gross per 24 hour   Intake 440 ml   Output --   Net 440 ml       Physical Exam:   Physical Exam  Vitals and nursing note reviewed.   Constitutional:       General: He is not in acute distress.  HENT:      Head: Normocephalic and atraumatic.      Nose: Nose normal.      Mouth/Throat:      Mouth: Mucous membranes are moist.      Pharynx: Oropharynx is clear.   Eyes:      Pupils: Pupils are equal, round, and reactive to light.   Cardiovascular:      Rate and Rhythm: Normal rate and regular rhythm.      Pulses: Normal pulses.      Heart sounds: Normal heart sounds.   Pulmonary:      Effort: Pulmonary effort is normal. No respiratory distress.      Breath sounds: Normal breath sounds.   Abdominal:      General: Bowel sounds are normal.      Palpations: Abdomen is soft.      Tenderness: There is no abdominal tenderness.   Musculoskeletal:      Cervical back: Neck supple.      Right lower leg: No edema.      Left lower leg: No edema.   Skin:     General: Skin is warm and dry.      Capillary Refill: Capillary refill takes less than 2 seconds.   Neurological:      General: No focal deficit present.      Mental Status: He is alert. Mental status is at baseline.   Psychiatric:         Behavior: Behavior is cooperative.        Additional Data:     Labs:  Results from last 7 days   Lab  Units 03/11/24  0519   WBC Thousand/uL 7.35   HEMOGLOBIN g/dL 13.0   HEMATOCRIT % 40.1   PLATELETS Thousands/uL 139*     Results from last 7 days   Lab Units 03/11/24  0519   SODIUM mmol/L 139   POTASSIUM mmol/L 4.1   CHLORIDE mmol/L 104   CO2 mmol/L 25   BUN mg/dL 34*   CREATININE mg/dL 0.72   ANION GAP mmol/L 10   CALCIUM mg/dL 9.7   GLUCOSE RANDOM mg/dL 108         Results from last 7 days   Lab Units 03/16/24  1112 03/16/24  0746 03/15/24  2100 03/15/24  1616 03/15/24  1106 03/15/24  0738 03/14/24  2216 03/14/24  1630 03/14/24  1127 03/14/24  0756 03/13/24  2038 03/13/24  1613   POC GLUCOSE mg/dl 190* 107 177* 121 169* 106 172* 130 162* 121 161* 139               Lines/Drains:  Invasive Devices       None                   Last 24 Hours Medication List:   Current Facility-Administered Medications   Medication Dose Route Frequency Provider Last Rate    acetaminophen  650 mg Oral 4x Daily PRN Lizzie Jimi, CRNP      divalproex sodium  250 mg Oral Daily Lizzie Jimi, CRNP      divalproex sodium  1,000 mg Oral HS Lizzie Jimi, CRNP      divalproex sodium  500 mg Oral Daily Lizzie Jimi, HUNTER      docusate sodium  100 mg Oral BID PRN Hasmukh Becker PA-C      enoxaparin  40 mg Subcutaneous Daily Hasmukh Becker PA-C      gabapentin  400 mg Oral TID Hasmukh Becker PA-C      ibuprofen  600 mg Oral Q6H PRN Ancelmo Mota PA-C      insulin lispro  1-5 Units Subcutaneous TID AC Hasmukh Becker PA-C      insulin lispro  1-6 Units Subcutaneous HS Hasmukh Becker PA-C      lacosamide  150 mg Oral Daily Hasmukh Becker PA-C      lacosamide  200 mg Oral HS Hasmukh Becker PA-C      levETIRAcetam  1,500 mg Oral Q12H [facility] Hasmukh Becker PA-C      levothyroxine  150 mcg Oral Daily Hasmukh Becker PA-C      lisinopril  10 mg Oral Daily Hasmukh Becker PA-C      loratadine  10 mg Oral Daily Hasmukh Becker PA-C      multivitamin stress formula  1 tablet Oral Daily Hasmukh Becker PA-C      OLANZapine  5 mg Intramuscular Q3H PRN Hasmukh  JAIR Becker      pravastatin  80 mg Oral Daily With Dinner Hasmukh Becker PA-C      QUEtiapine  150 mg Oral BID HUNTER Lopez      QUEtiapine  300 mg Oral HS HUNTER Lopez      tamsulosin  0.4 mg Oral Daily With Dinner HUNTER Lopez      temazepam  15 mg Oral HS PRN Hasmukh Becker PA-C      zonisamide  300 mg Oral HS Hasmukh Becker PA-C          Today, Patient Was Seen By: HUNTER Main    **Please Note: This note may have been constructed using a voice recognition system.**

## 2024-03-16 NOTE — PLAN OF CARE
Problem: PAIN - ADULT  Goal: Verbalizes/displays adequate comfort level or baseline comfort level  Description: Interventions:  - Encourage patient to monitor pain and request assistance  - Assess pain using appropriate pain scale  - Administer analgesics based on type and severity of pain and evaluate response  - Implement non-pharmacological measures as appropriate and evaluate response  - Consider cultural and social influences on pain and pain management  - Notify physician/advanced practitioner if interventions unsuccessful or patient reports new pain  Outcome: Progressing     Problem: INFECTION - ADULT  Goal: Absence or prevention of progression during hospitalization  Description: INTERVENTIONS:  - Assess and monitor for signs and symptoms of infection  - Monitor lab/diagnostic results  - Monitor all insertion sites, i.e. indwelling lines, tubes, and drains  - Monitor endotracheal if appropriate and nasal secretions for changes in amount and color  - [place] appropriate cooling/warming therapies per order  - Administer medications as ordered  - Instruct and encourage patient and family to use good hand hygiene technique  - Identify and instruct in appropriate isolation precautions for identified infection/condition  Outcome: Progressing  Goal: Absence of fever/infection during neutropenic period  Description: INTERVENTIONS:  - Monitor WBC    Outcome: Progressing     Problem: SAFETY ADULT  Goal: Patient will remain free of falls  Description: INTERVENTIONS:  - Educate patient/family on patient safety including physical limitations  - Instruct patient to call for assistance with activity   - Consult OT/PT to assist with strengthening/mobility   - Keep Call bell within reach  - Keep bed low and locked with side rails adjusted as appropriate  - Keep care items and personal belongings within reach  - Initiate and maintain comfort rounds  - Make Fall Risk Sign visible to staff  - Offer Toileting every 4 Hours,  in advance of need  - Initiate/Maintain bedalarm  - Obtain necessary fall risk management equipment: .  - Apply yellow socks and bracelet for high fall risk patients  - Consider moving patient to room near nurses station  Outcome: Progressing  Goal: Maintain or return to baseline ADL function  Description: INTERVENTIONS:  -  Assess patient's ability to carry out ADLs; assess patient's baseline for ADL function and identify physical deficits which impact ability to perform ADLs (bathing, care of mouth/teeth, toileting, grooming, dressing, etc.)  - Assess/evaluate cause of self-care deficits   - Assess range of motion  - Assess patient's mobility; develop plan if impaired  - Assess patient's need for assistive devices and provide as appropriate  - Encourage maximum independence but intervene and supervise when necessary  - Involve family in performance of ADLs  - Assess for home care needs following discharge   - Consider OT consult to assist with ADL evaluation and planning for discharge  - Provide patient education as appropriate  Outcome: Progressing  Goal: Maintains/Returns to pre admission functional level  Description: INTERVENTIONS:  - Perform AM-PAC 6 Click Basic Mobility/ Daily Activity assessment daily.  - Set and communicate daily mobility goal to care team and patient/family/caregiver.   - Collaborate with rehabilitation services on mobility goals if consulted  - Perform Range of Motion 3 times a day.  - Reposition patient every 2 hours.  - Dangle patient 3 times a day  - Stand patient 3 times a day  - Ambulate patient 3 times a day  - Out of bed to chair 3 times a day   - Out of bed for meals 3 times a day  - Out of bed for toileting  - Record patient progress and toleration of activity level   Outcome: Progressing

## 2024-03-16 NOTE — ASSESSMENT & PLAN NOTE
Lab Results   Component Value Date    HGBA1C 5.5 11/27/2023       Recent Labs     03/15/24  1616 03/15/24  2100 03/16/24  0746 03/16/24  1112   POCGLU 121 177* 107 190*         Blood Sugar Average: Last 72 hrs:  (P) 143.8160183484102199    Continue diabetic diet       Continue fingerstick blood sugar with sliding scale coverage  Hypoglycemia protocol

## 2024-03-16 NOTE — PLAN OF CARE
Problem: SAFETY ADULT  Goal: Patient will remain free of falls  Description: INTERVENTIONS:  - Educate patient/family on patient safety including physical limitations  - Instruct patient to call for assistance with activity   - Consult OT/PT to assist with strengthening/mobility   - Keep Call bell within reach  - Keep bed low and locked with side rails adjusted as appropriate  - Keep care items and personal belongings within reach  - Initiate and maintain comfort rounds  - Make Fall Risk Sign visible to staff  - Offer Toileting every 2 Hours, in advance of need  - Initiate/Maintain bed alarm  - Obtain necessary fall risk management equipment: alarm  - Apply yellow socks and bracelet for high fall risk patients  - Consider moving patient to room near nurses station  Outcome: Progressing  Goal: Maintain or return to baseline ADL function  Description: INTERVENTIONS:  -  Assess patient's ability to carry out ADLs; assess patient's baseline for ADL function and identify physical deficits which impact ability to perform ADLs (bathing, care of mouth/teeth, toileting, grooming, dressing, etc.)  - Assess/evaluate cause of self-care deficits   - Assess range of motion  - Assess patient's mobility; develop plan if impaired  - Assess patient's need for assistive devices and provide as appropriate  - Encourage maximum independence but intervene and supervise when necessary  - Involve family in performance of ADLs  - Assess for home care needs following discharge   - Consider OT consult to assist with ADL evaluation and planning for discharge  - Provide patient education as appropriate  Outcome: Progressing  Goal: Maintains/Returns to pre admission functional level  Description: INTERVENTIONS:  - Perform AM-PAC 6 Click Basic Mobility/ Daily Activity assessment daily.  - Set and communicate daily mobility goal to care team and patient/family/caregiver.   - Collaborate with rehabilitation services on mobility goals if  consulted  - Perform Range of Motion 3 times a day.  - Reposition patient every 3 hours.  - Dangle patient 3 times a day  - Stand patient 3 times a day  - Ambulate patient 3 times a day  - Out of bed to chair 3 times a day   - Out of bed for meals 3 times a day  - Out of bed for toileting  - Record patient progress and toleration of activity level   Outcome: Progressing

## 2024-03-17 LAB
GLUCOSE SERPL-MCNC: 135 MG/DL (ref 65–140)
GLUCOSE SERPL-MCNC: 140 MG/DL (ref 65–140)
GLUCOSE SERPL-MCNC: 147 MG/DL (ref 65–140)
GLUCOSE SERPL-MCNC: 154 MG/DL (ref 65–140)

## 2024-03-17 PROCEDURE — 99233 SBSQ HOSP IP/OBS HIGH 50: CPT | Performed by: NURSE PRACTITIONER

## 2024-03-17 PROCEDURE — 82948 REAGENT STRIP/BLOOD GLUCOSE: CPT

## 2024-03-17 RX ADMIN — QUETIAPINE FUMARATE 150 MG: 150 TABLET, EXTENDED RELEASE ORAL at 09:49

## 2024-03-17 RX ADMIN — TAMSULOSIN HYDROCHLORIDE 0.4 MG: 0.4 CAPSULE ORAL at 16:47

## 2024-03-17 RX ADMIN — DIVALPROEX SODIUM 500 MG: 500 TABLET, DELAYED RELEASE ORAL at 09:51

## 2024-03-17 RX ADMIN — DIVALPROEX SODIUM 1000 MG: 500 TABLET, DELAYED RELEASE ORAL at 22:07

## 2024-03-17 RX ADMIN — ZONISAMIDE 300 MG: 100 CAPSULE ORAL at 22:07

## 2024-03-17 RX ADMIN — IBUPROFEN 600 MG: 600 TABLET, FILM COATED ORAL at 11:03

## 2024-03-17 RX ADMIN — GABAPENTIN 400 MG: 400 CAPSULE ORAL at 16:47

## 2024-03-17 RX ADMIN — GABAPENTIN 400 MG: 400 CAPSULE ORAL at 22:07

## 2024-03-17 RX ADMIN — QUETIAPINE FUMARATE 300 MG: 150 TABLET, EXTENDED RELEASE ORAL at 22:07

## 2024-03-17 RX ADMIN — PRAVASTATIN SODIUM 80 MG: 40 TABLET ORAL at 16:47

## 2024-03-17 RX ADMIN — LACOSAMIDE 150 MG: 150 TABLET ORAL at 09:54

## 2024-03-17 RX ADMIN — B-COMPLEX W/ C & FOLIC ACID TAB 1 TABLET: TAB at 09:49

## 2024-03-17 RX ADMIN — ENOXAPARIN SODIUM 40 MG: 40 INJECTION SUBCUTANEOUS at 09:50

## 2024-03-17 RX ADMIN — TEMAZEPAM 15 MG: 7.5 CAPSULE ORAL at 22:11

## 2024-03-17 RX ADMIN — LACOSAMIDE 200 MG: 150 TABLET ORAL at 22:07

## 2024-03-17 RX ADMIN — LEVETIRACETAM 1500 MG: 500 TABLET, FILM COATED ORAL at 09:50

## 2024-03-17 RX ADMIN — INSULIN LISPRO 1 UNITS: 100 INJECTION, SOLUTION INTRAVENOUS; SUBCUTANEOUS at 22:08

## 2024-03-17 RX ADMIN — DIVALPROEX SODIUM 250 MG: 250 TABLET, FILM COATED, EXTENDED RELEASE ORAL at 09:51

## 2024-03-17 RX ADMIN — GABAPENTIN 400 MG: 400 CAPSULE ORAL at 09:49

## 2024-03-17 RX ADMIN — LEVETIRACETAM 1500 MG: 500 TABLET, FILM COATED ORAL at 22:07

## 2024-03-17 RX ADMIN — TEMAZEPAM 15 MG: 7.5 CAPSULE ORAL at 01:05

## 2024-03-17 RX ADMIN — LISINOPRIL 10 MG: 10 TABLET ORAL at 09:49

## 2024-03-17 RX ADMIN — QUETIAPINE FUMARATE 150 MG: 150 TABLET, EXTENDED RELEASE ORAL at 14:38

## 2024-03-17 RX ADMIN — LORATADINE 10 MG: 10 TABLET ORAL at 09:49

## 2024-03-17 RX ADMIN — LEVOTHYROXINE SODIUM 150 MCG: 75 TABLET ORAL at 09:49

## 2024-03-17 NOTE — ASSESSMENT & PLAN NOTE
Lab Results   Component Value Date    HGBA1C 5.5 11/27/2023       Recent Labs     03/16/24  1555 03/16/24  1630 03/16/24  2134 03/17/24  0705   POCGLU 146* 146* 171* 140         Blood Sugar Average: Last 72 hrs:  (P) 147    Continue diabetic diet       Continue fingerstick blood sugar with sliding scale coverage  Hypoglycemia protocol

## 2024-03-17 NOTE — ASSESSMENT & PLAN NOTE
S/p chapman catheter, since discontinued   Continue tamsulosin  Continue urinary retention protocol      Recorded urinary output is QS

## 2024-03-17 NOTE — PROGRESS NOTES
[facility]  Progress Note  Name: Jairon Oconnell I  MRN: [id number]  Unit/Bed#: -01 I Date of Admission: 3/1/2024   Date of Service: 3/17/2024 I Hospital Day: 16    Assessment/Plan     * Ambulatory dysfunction  Assessment & Plan  With non-operative right hip greater trochanter fracture seen and evaluated by orthopedic surgery on the previous admission  He was discharged to a skilled nursing facility, became agitated, broke a chair, and was reported to become aggressive with staff.  The facility will now not take him back  Psychiatry recommendations appreciated. No indication for inpatient psychiatric hospitalization at this time.  Recommendation is rehab/SNF  PT/OT recommendations appreciated.  Medically clear for discharge to rehab facility   Case management following, input appreciated            Generalized convulsive epilepsy (HCC)  Assessment & Plan  Continue Depakote 1000 mg p.o. nightly and 750 mg p.o. daily, (per  must be brand name) Neurontin 400 mg p.o. 3 times daily, Vimpat 150 mg p.o. daily and 200 mg p.o. nightly, zonisamide 300 mg HS, and Keppra 1500 mg p.o. every 12 hours  Continue seizure precautions           Type 2 diabetes mellitus with diabetic neuropathy, without long-term current use of insulin (HCC)  Assessment & Plan  Lab Results   Component Value Date    HGBA1C 5.5 11/27/2023       Recent Labs     03/16/24  1555 03/16/24  1630 03/16/24  2134 03/17/24  0705   POCGLU 146* 146* 171* 140         Blood Sugar Average: Last 72 hrs:  (P) 147    Continue diabetic diet       Continue fingerstick blood sugar with sliding scale coverage  Hypoglycemia protocol    Urinary retention  Assessment & Plan  S/p chapman catheter, since discontinued   Continue tamsulosin  Continue urinary retention protocol      Guarded urinary output is QS      Pressure ulcer of sacral region, stage 3 (HCC)  Assessment & Plan  POA  Wound care recommendations appreciated: Scarring to  the left lateral buttock from stage 3 pressure injury in the past. Scarring is intact and blanchable and hyperpigmented   Turn and reposition every 2 hours, frequent offloading           Behavior concern in adult  Assessment & Plan  He has been cooperative without any agitation or behavioral issues   Seroquel XR decreased to 150 BID and 300 HS due to excessive sleepiness           Acquired hypothyroidism  Assessment & Plan  Continue levothyroxine 150 mcg p.o. every morning           Essential hypertension  Assessment & Plan  Continue lisinopril 10 mg p.o. daily  Monitor blood pressure            Mixed hyperlipidemia  Assessment & Plan  Continue Pravachol 80 mg oral daily at bedtime            Ataxic cerebral palsy (HCC)  Assessment & Plan  Continue supportive care   Continue with current medications                 VTE Pharmacologic Prophylaxis: VTE Score: 3 Moderate Risk (Score 3-4) - Pharmacological DVT Prophylaxis Ordered: enoxaparin (Lovenox).    Mobility:   Basic Mobility Inpatient Raw Score: 18  JH-HLM Goal: 6: Walk 10 steps or more  JH-HLM Achieved: 7: Walk 25 feet or more  JH-HLM Goal achieved. Continue to encourage appropriate mobility.    Patient Centered Rounds: I performed bedside rounds with nursing staff today.   Discussions with Specialists or Other Care Team Provider: CM    Education and Discussions with Family / Patient:  No medical updates to provide at this time. .     Total Time Spent on Date of Encounter in care of patient: 25 mins. This time was spent on one or more of the following: performing physical exam; counseling and coordination of care; obtaining or reviewing history; documenting in the medical record; reviewing/ordering tests, medications or procedures; communicating with other healthcare professionals and discussing with patient's family/caregivers.    Current Length of Stay: 16 day(s)  Current Patient Status: Inpatient   Certification Statement: The patient will continue to require  additional inpatient hospital stay due to care coordination.   Discharge Plan:  TBD.    Code Status: Level 1 - Full Code    Subjective:   Patient seen and examined.  No complaints offered.     Objective:     Vitals:   Temp (24hrs), Av.7 °F (36.5 °C), Min:97.6 °F (36.4 °C), Max:97.7 °F (36.5 °C)    Temp:  [97.6 °F (36.4 °C)-97.7 °F (36.5 °C)] 97.7 °F (36.5 °C)  HR:  [72-93] 72  Resp:  [17-18] 18  BP: (130-146)/(77-78) 139/77  SpO2:  [92 %-97 %] 92 %  Body mass index is 21.29 kg/m².     Input and Output Summary (last 24 hours):     Intake/Output Summary (Last 24 hours) at 3/17/2024 0801  Last data filed at 3/16/2024 2201  Gross per 24 hour   Intake 1060 ml   Output --   Net 1060 ml       Physical Exam:   Physical Exam  Vitals and nursing note reviewed.   HENT:      Head: Normocephalic and atraumatic.      Nose: Nose normal.      Mouth/Throat:      Mouth: Mucous membranes are moist.      Pharynx: Oropharynx is clear.   Eyes:      Pupils: Pupils are equal, round, and reactive to light.   Cardiovascular:      Rate and Rhythm: Normal rate and regular rhythm.      Pulses: Normal pulses.      Heart sounds: Normal heart sounds.   Pulmonary:      Effort: Pulmonary effort is normal. No respiratory distress.      Breath sounds: Normal breath sounds.   Abdominal:      General: Bowel sounds are normal.      Palpations: Abdomen is soft.      Tenderness: There is no abdominal tenderness.   Musculoskeletal:      Cervical back: Neck supple.      Right lower leg: No edema.      Left lower leg: No edema.   Skin:     General: Skin is warm and dry.      Capillary Refill: Capillary refill takes less than 2 seconds.   Neurological:      General: No focal deficit present.      Mental Status: He is alert and oriented to person, place, and time. Mental status is at baseline.        Additional Data:     Labs:  Results from last 7 days   Lab Units 24  0519   WBC Thousand/uL 7.35   HEMOGLOBIN g/dL 13.0   HEMATOCRIT % 40.1   PLATELETS  Thousands/uL 139*     Results from last 7 days   Lab Units 03/11/24  0519   SODIUM mmol/L 139   POTASSIUM mmol/L 4.1   CHLORIDE mmol/L 104   CO2 mmol/L 25   BUN mg/dL 34*   CREATININE mg/dL 0.72   ANION GAP mmol/L 10   CALCIUM mg/dL 9.7   GLUCOSE RANDOM mg/dL 108         Results from last 7 days   Lab Units 03/17/24  0705 03/16/24  2134 03/16/24  1630 03/16/24  1555 03/16/24  1112 03/16/24  0746 03/15/24  2100 03/15/24  1616 03/15/24  1106 03/15/24  0738 03/14/24  2216 03/14/24  1630   POC GLUCOSE mg/dl 140 171* 146* 146* 190* 107 177* 121 169* 106 172* 130               Lines/Drains:  Invasive Devices       None                 Imaging: Reviewed radiology reports from this admission including: xray(s)    Recent Cultures (last 7 days):         Last 24 Hours Medication List:   Current Facility-Administered Medications   Medication Dose Route Frequency Provider Last Rate    acetaminophen  650 mg Oral 4x Daily PRN Lizzie Jimi, CRNP      divalproex sodium  250 mg Oral Daily Lizzie Jimi, CRNP      divalproex sodium  1,000 mg Oral HS Lizzie Jimi, CRNP      divalproex sodium  500 mg Oral Daily Lizzie Jimi, CRNP      docusate sodium  100 mg Oral BID PRN Hasmukh Becker PA-C      enoxaparin  40 mg Subcutaneous Daily Hasmukh Becker PA-C      gabapentin  400 mg Oral TID Hasmukh Becker PA-C      ibuprofen  600 mg Oral Q6H PRN Ancelmo Mota PA-C      insulin lispro  1-5 Units Subcutaneous TID AC Hasmukh Becker PA-C      insulin lispro  1-6 Units Subcutaneous HS Hasmukh Becker PA-C      lacosamide  150 mg Oral Daily Hasmukh Becker PA-C      lacosamide  200 mg Oral HS Hasmukh Becker PA-C      levETIRAcetam  1,500 mg Oral Q12H [facility] Hasmukh Becker PA-C      levothyroxine  150 mcg Oral Daily Hasmukh Becker PA-C      lisinopril  10 mg Oral Daily Hasmukh Becker PA-C      loratadine  10 mg Oral Daily Hasmukh Becker PA-C      multivitamin stress formula  1 tablet Oral Daily Hasmukh Becker PA-C      OLANZapine  5 mg  Intramuscular Q3H PRN Hasmukh Becker PA-C      pravastatin  80 mg Oral Daily With Dinner Hasmukh Becker PA-C      QUEtiapine  150 mg Oral BID HUNTER Lopez      QUEtiapine  300 mg Oral HS HUNTER Lopez      tamsulosin  0.4 mg Oral Daily With Dinner HUNTER Lopez      temazepam  15 mg Oral HS PRN Hasmukh Becker PA-C      zonisamide  300 mg Oral HS Hasmukh Becker PA-C          Today, Patient Was Seen By: HUNTER Main    **Please Note: This note may have been constructed using a voice recognition system.**

## 2024-03-18 LAB
GLUCOSE SERPL-MCNC: 118 MG/DL (ref 65–140)
GLUCOSE SERPL-MCNC: 126 MG/DL (ref 65–140)
GLUCOSE SERPL-MCNC: 131 MG/DL (ref 65–140)
GLUCOSE SERPL-MCNC: 167 MG/DL (ref 65–140)

## 2024-03-18 PROCEDURE — 99233 SBSQ HOSP IP/OBS HIGH 50: CPT | Performed by: NURSE PRACTITIONER

## 2024-03-18 PROCEDURE — 82948 REAGENT STRIP/BLOOD GLUCOSE: CPT

## 2024-03-18 RX ADMIN — PRAVASTATIN SODIUM 80 MG: 40 TABLET ORAL at 15:48

## 2024-03-18 RX ADMIN — GABAPENTIN 400 MG: 400 CAPSULE ORAL at 09:58

## 2024-03-18 RX ADMIN — DIVALPROEX SODIUM 1000 MG: 500 TABLET, DELAYED RELEASE ORAL at 21:39

## 2024-03-18 RX ADMIN — GABAPENTIN 400 MG: 400 CAPSULE ORAL at 15:02

## 2024-03-18 RX ADMIN — LACOSAMIDE 200 MG: 150 TABLET ORAL at 21:40

## 2024-03-18 RX ADMIN — DIVALPROEX SODIUM 500 MG: 500 TABLET, DELAYED RELEASE ORAL at 10:01

## 2024-03-18 RX ADMIN — QUETIAPINE FUMARATE 300 MG: 150 TABLET, EXTENDED RELEASE ORAL at 21:40

## 2024-03-18 RX ADMIN — LORATADINE 10 MG: 10 TABLET ORAL at 09:58

## 2024-03-18 RX ADMIN — DIVALPROEX SODIUM 250 MG: 250 TABLET, FILM COATED, EXTENDED RELEASE ORAL at 10:01

## 2024-03-18 RX ADMIN — LEVETIRACETAM 1500 MG: 500 TABLET, FILM COATED ORAL at 21:39

## 2024-03-18 RX ADMIN — LEVETIRACETAM 1500 MG: 500 TABLET, FILM COATED ORAL at 09:58

## 2024-03-18 RX ADMIN — ENOXAPARIN SODIUM 40 MG: 40 INJECTION SUBCUTANEOUS at 09:59

## 2024-03-18 RX ADMIN — ZONISAMIDE 300 MG: 100 CAPSULE ORAL at 21:40

## 2024-03-18 RX ADMIN — QUETIAPINE FUMARATE 150 MG: 150 TABLET, EXTENDED RELEASE ORAL at 09:57

## 2024-03-18 RX ADMIN — LEVOTHYROXINE SODIUM 150 MCG: 75 TABLET ORAL at 09:57

## 2024-03-18 RX ADMIN — INSULIN LISPRO 1 UNITS: 100 INJECTION, SOLUTION INTRAVENOUS; SUBCUTANEOUS at 21:44

## 2024-03-18 RX ADMIN — ACETAMINOPHEN 650 MG: 325 TABLET ORAL at 20:22

## 2024-03-18 RX ADMIN — LISINOPRIL 10 MG: 10 TABLET ORAL at 09:59

## 2024-03-18 RX ADMIN — B-COMPLEX W/ C & FOLIC ACID TAB 1 TABLET: TAB at 09:58

## 2024-03-18 RX ADMIN — GABAPENTIN 400 MG: 400 CAPSULE ORAL at 21:41

## 2024-03-18 RX ADMIN — TAMSULOSIN HYDROCHLORIDE 0.4 MG: 0.4 CAPSULE ORAL at 15:48

## 2024-03-18 RX ADMIN — LACOSAMIDE 150 MG: 150 TABLET ORAL at 09:58

## 2024-03-18 RX ADMIN — QUETIAPINE FUMARATE 150 MG: 150 TABLET, EXTENDED RELEASE ORAL at 15:02

## 2024-03-18 NOTE — ASSESSMENT & PLAN NOTE
Lab Results   Component Value Date    HGBA1C 5.5 11/27/2023       Recent Labs     03/17/24  1625 03/17/24  2101 03/18/24  0750 03/18/24  1059   POCGLU 135 154* 118 126         Blood Sugar Average: Last 72 hrs:  (P) 143.7565125365441146    Continue diabetic diet       Continue fingerstick blood sugar with sliding scale coverage  Hypoglycemia protocol

## 2024-03-18 NOTE — PROGRESS NOTES
Counts include 234 beds at the [facility]  Progress Note  Name: Jairon Oconnell I  MRN: [id number]  Unit/Bed#: [facility]-01 I Date of Admission: 3/1/2024   Date of Service: 3/18/2024 I Hospital Day: 17    Assessment/Plan     * Ambulatory dysfunction  Assessment & Plan  With non-operative right hip greater trochanter fracture seen and evaluated by orthopedic surgery on the previous admission  He was discharged to a skilled nursing facility, became agitated, broke a chair, and was reported to become aggressive with staff.  The facility will now not take him back  Psychiatry recommendations appreciated. No indication for inpatient psychiatric hospitalization at this time.  Recommendation is rehab/SNF  PT/OT recommendations appreciated.  Medically clear for discharge to rehab facility   Case management following, input appreciated            Generalized convulsive epilepsy (HCC)  Assessment & Plan  Continue Depakote 1000 mg p.o. nightly and 750 mg p.o. daily, (per  must be brand name) Neurontin 400 mg p.o. 3 times daily, Vimpat 150 mg p.o. daily and 200 mg p.o. nightly, zonisamide 300 mg HS, and Keppra 1500 mg p.o. every 12 hours  Continue seizure precautions           Type 2 diabetes mellitus with diabetic neuropathy, without long-term current use of insulin (HCC)  Assessment & Plan  Lab Results   Component Value Date    HGBA1C 5.5 11/27/2023       Recent Labs     03/17/24  1625 03/17/24  2101 03/18/24  0750 03/18/24  1059   POCGLU 135 154* 118 126         Blood Sugar Average: Last 72 hrs:  (P) 143.4904324228382556    Continue diabetic diet       Continue fingerstick blood sugar with sliding scale coverage  Hypoglycemia protocol    Urinary retention  Assessment & Plan  S/p chapman catheter, since discontinued   Continue tamsulosin  Continue urinary retention protocol      Recorded urinary output is QS      Pressure ulcer of sacral region, stage 3 (HCC)  Assessment & Plan  POA  Wound care recommendations  appreciated: Scarring to the left lateral buttock from stage 3 pressure injury in the past. Scarring is intact and blanchable and hyperpigmented   Turn and reposition every 2 hours, frequent offloading           Behavior concern in adult  Assessment & Plan  He has been cooperative without any agitation or behavioral issues   Seroquel XR decreased to 150 BID and 300 HS due to excessive sleepiness           Acquired hypothyroidism  Assessment & Plan  Continue levothyroxine 150 mcg p.o. every morning           Essential hypertension  Assessment & Plan  Continue lisinopril 10 mg p.o. daily  Monitor blood pressure            Mixed hyperlipidemia  Assessment & Plan  Continue Pravachol 80 mg oral daily at bedtime            Ataxic cerebral palsy (HCC)  Assessment & Plan  Continue supportive care   Continue with current medications               VTE Pharmacologic Prophylaxis: VTE Score: 3 High Risk (Score >/= 5) - Pharmacological DVT Prophylaxis Ordered: enoxaparin (Lovenox). Sequential Compression Devices Ordered.    Mobility:   Basic Mobility Inpatient Raw Score: 18  JH-HLM Goal: 6: Walk 10 steps or more  JH-HLM Achieved: 7: Walk 25 feet or more  JH-HLM Goal achieved. Continue to encourage appropriate mobility.    Patient Centered Rounds: I performed bedside rounds with nursing staff today.   Discussions with Specialists or Other Care Team Provider: CM    Education and Discussions with Family / Patient: Updated  (sister) at bedside.    Total Time Spent on Date of Encounter in care of patient: 28 mins. This time was spent on one or more of the following: performing physical exam; counseling and coordination of care; obtaining or reviewing history; documenting in the medical record; reviewing/ordering tests, medications or procedures; communicating with other healthcare professionals and discussing with patient's family/caregivers.    Current Length of Stay: 17 day(s)  Current Patient Status: Inpatient  "  Certification Statement: The patient will continue to require additional inpatient hospital stay due to care coordination.  Discharge Plan:  TBD.     Code Status: Level 1 - Full Code    Subjective:   No complaints offered.  Patient states, \"it looks warm out there.\"     Objective:     Vitals:   Temp (24hrs), Av.7 °F (36.5 °C), Min:97.7 °F (36.5 °C), Max:97.7 °F (36.5 °C)    Temp:  [97.7 °F (36.5 °C)] 97.7 °F (36.5 °C)  HR:  [62] 62  Resp:  [18] 18  BP: (135-142)/(76-77) 135/77  SpO2:  [93 %] 93 %  Body mass index is 21.29 kg/m².     Input and Output Summary (last 24 hours):     Intake/Output Summary (Last 24 hours) at 3/18/2024 1455  Last data filed at 3/17/2024 2001  Gross per 24 hour   Intake 480 ml   Output 600 ml   Net -120 ml       Physical Exam:     Physical Exam  Vitals and nursing note reviewed.   Constitutional:       General: He is not in acute distress.  HENT:      Head: Normocephalic and atraumatic.      Nose: Nose normal.      Mouth/Throat:      Mouth: Mucous membranes are moist.      Pharynx: Oropharynx is clear.   Eyes:      Pupils: Pupils are equal, round, and reactive to light.   Cardiovascular:      Rate and Rhythm: Normal rate and regular rhythm.      Pulses: Normal pulses.      Heart sounds: Normal heart sounds.   Pulmonary:      Effort: Pulmonary effort is normal. No respiratory distress.      Breath sounds: Normal breath sounds.   Abdominal:      General: Bowel sounds are normal.      Palpations: Abdomen is soft.      Tenderness: There is no abdominal tenderness.   Musculoskeletal:      Cervical back: Neck supple.      Right lower leg: No edema.      Left lower leg: No edema.   Skin:     General: Skin is warm and dry.      Capillary Refill: Capillary refill takes less than 2 seconds.   Neurological:      General: No focal deficit present.      Mental Status: He is alert. Mental status is at baseline.          Additional Data:     Labs:              Results from last 7 days   Lab Units " 03/18/24  1059 03/18/24  0750 03/17/24  2101 03/17/24  1625 03/17/24  1103 03/17/24  0705 03/16/24  2134 03/16/24  1630 03/16/24  1555 03/16/24  1112 03/16/24  0746 03/15/24  2100   POC GLUCOSE mg/dl 126 118 154* 135 147* 140 171* 146* 146* 190* 107 177*               Lines/Drains:  Invasive Devices       None                     Last 24 Hours Medication List:   Current Facility-Administered Medications   Medication Dose Route Frequency Provider Last Rate    acetaminophen  650 mg Oral 4x Daily PRN Lizzie Jimi, CRNP      divalproex sodium  250 mg Oral Daily Lizzie Jimi, CRNP      divalproex sodium  1,000 mg Oral HS Lizzie Jimi, CRNP      divalproex sodium  500 mg Oral Daily Lizzie Jimi, CRNP      docusate sodium  100 mg Oral BID PRN Hasmukh Becker PA-C      enoxaparin  40 mg Subcutaneous Daily Hasmukh Becker PA-C      gabapentin  400 mg Oral TID Hasmukh Becker PA-C      ibuprofen  600 mg Oral Q6H PRN Ancelmo Mota PA-C      insulin lispro  1-5 Units Subcutaneous TID AC Hasmukh Becker PA-C      insulin lispro  1-6 Units Subcutaneous HS Hasmukh Becker PA-C      lacosamide  150 mg Oral Daily Hasmukh Becker PA-C      lacosamide  200 mg Oral HS Hasmukh Becker PA-C      levETIRAcetam  1,500 mg Oral Q12H [facility] Hasmukh Becker PA-C      levothyroxine  150 mcg Oral Daily Hasmukh Becker PA-C      lisinopril  10 mg Oral Daily Hasmukh Becker PA-C      loratadine  10 mg Oral Daily Hasmukh Becker PA-C      multivitamin stress formula  1 tablet Oral Daily Hasmukh Becker PA-C      OLANZapine  5 mg Intramuscular Q3H PRN Hasmukh Becker PA-C      pravastatin  80 mg Oral Daily With Dinner Hasmukh Becker PA-C      QUEtiapine  150 mg Oral BID Lizzie Krause, HUNTER      QUEtiapine  300 mg Oral HS HUNTER Lopez      tamsulosin  0.4 mg Oral Daily With Dinner LizzieHUNTER Lewis      temazepam  15 mg Oral HS PRN Hasmukh Becker PA-C      zonisamide  300 mg Oral HS Hasmukh Becker PA-C          Today, Patient Was Seen By:  HUNTER Main    **Please Note: This note may have been constructed using a voice recognition system.**

## 2024-03-18 NOTE — PLAN OF CARE

## 2024-03-19 LAB
GLUCOSE SERPL-MCNC: 120 MG/DL (ref 65–140)
GLUCOSE SERPL-MCNC: 160 MG/DL (ref 65–140)
GLUCOSE SERPL-MCNC: 164 MG/DL (ref 65–140)
GLUCOSE SERPL-MCNC: 195 MG/DL (ref 65–140)

## 2024-03-19 PROCEDURE — 82948 REAGENT STRIP/BLOOD GLUCOSE: CPT

## 2024-03-19 PROCEDURE — 99233 SBSQ HOSP IP/OBS HIGH 50: CPT | Performed by: NURSE PRACTITIONER

## 2024-03-19 RX ADMIN — GABAPENTIN 400 MG: 400 CAPSULE ORAL at 21:37

## 2024-03-19 RX ADMIN — LEVETIRACETAM 1500 MG: 500 TABLET, FILM COATED ORAL at 08:34

## 2024-03-19 RX ADMIN — INSULIN LISPRO 1 UNITS: 100 INJECTION, SOLUTION INTRAVENOUS; SUBCUTANEOUS at 17:20

## 2024-03-19 RX ADMIN — DIVALPROEX SODIUM 250 MG: 250 TABLET, FILM COATED, EXTENDED RELEASE ORAL at 08:35

## 2024-03-19 RX ADMIN — LEVETIRACETAM 1500 MG: 500 TABLET, FILM COATED ORAL at 21:38

## 2024-03-19 RX ADMIN — GABAPENTIN 400 MG: 400 CAPSULE ORAL at 08:34

## 2024-03-19 RX ADMIN — PRAVASTATIN SODIUM 80 MG: 40 TABLET ORAL at 17:19

## 2024-03-19 RX ADMIN — B-COMPLEX W/ C & FOLIC ACID TAB 1 TABLET: TAB at 08:34

## 2024-03-19 RX ADMIN — LORATADINE 10 MG: 10 TABLET ORAL at 08:34

## 2024-03-19 RX ADMIN — LACOSAMIDE 150 MG: 150 TABLET ORAL at 08:34

## 2024-03-19 RX ADMIN — QUETIAPINE FUMARATE 150 MG: 150 TABLET, EXTENDED RELEASE ORAL at 13:27

## 2024-03-19 RX ADMIN — INSULIN LISPRO 2 UNITS: 100 INJECTION, SOLUTION INTRAVENOUS; SUBCUTANEOUS at 21:38

## 2024-03-19 RX ADMIN — ENOXAPARIN SODIUM 40 MG: 40 INJECTION SUBCUTANEOUS at 08:34

## 2024-03-19 RX ADMIN — QUETIAPINE FUMARATE 150 MG: 150 TABLET, EXTENDED RELEASE ORAL at 08:34

## 2024-03-19 RX ADMIN — TAMSULOSIN HYDROCHLORIDE 0.4 MG: 0.4 CAPSULE ORAL at 17:19

## 2024-03-19 RX ADMIN — LEVOTHYROXINE SODIUM 150 MCG: 75 TABLET ORAL at 08:34

## 2024-03-19 RX ADMIN — GABAPENTIN 400 MG: 400 CAPSULE ORAL at 17:19

## 2024-03-19 RX ADMIN — QUETIAPINE FUMARATE 300 MG: 150 TABLET, EXTENDED RELEASE ORAL at 21:37

## 2024-03-19 RX ADMIN — DIVALPROEX SODIUM 1000 MG: 500 TABLET, DELAYED RELEASE ORAL at 21:38

## 2024-03-19 RX ADMIN — INSULIN LISPRO 1 UNITS: 100 INJECTION, SOLUTION INTRAVENOUS; SUBCUTANEOUS at 11:58

## 2024-03-19 RX ADMIN — DIVALPROEX SODIUM 500 MG: 500 TABLET, DELAYED RELEASE ORAL at 08:35

## 2024-03-19 RX ADMIN — ZONISAMIDE 300 MG: 100 CAPSULE ORAL at 21:37

## 2024-03-19 RX ADMIN — LACOSAMIDE 200 MG: 150 TABLET ORAL at 21:37

## 2024-03-19 RX ADMIN — LISINOPRIL 10 MG: 10 TABLET ORAL at 08:34

## 2024-03-19 NOTE — CASE MANAGEMENT
Case Management Discharge Planning Note    Patient name Jairon Oconnell  Location /-01 MRN [id number]  : 1965 Date 3/19/2024       Current Admission Date: 3/1/2024  Current Admission Diagnosis:Ambulatory dysfunction   Patient Active Problem List    Diagnosis Date Noted    Urinary retention 2024    Nondisplaced fracture of greater trochanter of right femur, subsequent encounter for closed fracture with routine healing 2024    Closed nondisplaced fracture of proximal phalanx of left index finger with routine healing, subsequent encounter 10/30/2023    Pressure ulcer of sacral region, stage 3 (HCC) 2023    Ambulatory dysfunction 2022    Social problem 2021    Hypomagnesemia 2021    Thrombocytopathia ([facility]) 2020    Hyponatremia 2019    Metabolic encephalopathy 2019    Seborrheic dermatitis of scalp 2019    Nearsightedness 2019    Behavior concern in adult 2019    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HCC) 2019    Vitamin B12 deficiency 2017    Mixed hyperlipidemia 2016    Vitamin D deficiency 2016    Type 2 diabetes mellitus with diabetic neuropathy, without long-term current use of insulin ([facility]) 06/15/2016    Acquired hypothyroidism 06/15/2016    Cataract 2013    Ataxic cerebral palsy ([facility]) 2013    Generalized convulsive epilepsy ([facility]) 2013    Essential hypertension 2013    Osteoporosis 2013    Scoliosis 2013      LOS (days): 18  Geometric Mean LOS (GMLOS) (days): 3.9  Days to GMLOS:-14.8     OBJECTIVE:  Risk of Unplanned Readmission Score: 25.41         Current admission status: Inpatient   Preferred Pharmacy:   [facility] [place] - [address]  [address] [place] [address]  Phone: [phone] Fax: [phone]    [id number] [place] - [address] [address]  Phone:  [phone] Fax: [phone]    Primary Care Provider: HUNTER Brown    Primary Insurance: MEDICARE  Secondary Insurance: [place] [employer] [place]    DISCHARGE DETAILS:    Discharge planning discussed with:: sharon  & Madeline at the bedside  Freedom of Choice: Yes  Comments - Freedom of Choice: cm had expanded the searhcb for snf rehab - cm did call [facility] today - they had been reviewing - they are unable to offer a bed-  Madeline stated today they are working on getting the pt to a respite home in [facility] and then he will go to his permanent home - Madeline would transport [facility] pt- pt maybe able to be d/c on Thursday- they need to check if the family is able to accept the pt on that day  CM contacted family/caregiver?: Yes             Contacts  Patient Contacts: Madeline Arvizu  Relationship to Patient:: Other (Comment)  Contact Method: In Person  Reason/Outcome: Discharge Planning         DME Referral Provided  Referral made for DME?: No    Other Referral/Resources/Interventions Provided:  Interventions: Other (Specify)  Referral Comments: pt to be d/c to a respite Life sharing home    Would you like to participate in our [facility] service program?  : No - Declined    Treatment Team Recommendation:  (respite life sharing home - staff)

## 2024-03-19 NOTE — CASE MANAGEMENT
Case Management Discharge Planning Note    Patient name Jairon Oconnell  Location /-01 MRN [id number]  : 1965 Date 3/19/2024       Current Admission Date: 3/1/2024  Current Admission Diagnosis:Ambulatory dysfunction   Patient Active Problem List    Diagnosis Date Noted    Urinary retention 2024    Nondisplaced fracture of greater trochanter of right femur, subsequent encounter for closed fracture with routine healing 2024    Closed nondisplaced fracture of proximal phalanx of left index finger with routine healing, subsequent encounter 10/30/2023    Pressure ulcer of sacral region, stage 3 (HCC) 2023    Ambulatory dysfunction 2022    Social problem 2021    Hypomagnesemia 2021    Thrombocytopathia ([facility]) 2020    Hyponatremia 2019    Metabolic encephalopathy 2019    Seborrheic dermatitis of scalp 2019    Nearsightedness 2019    Behavior concern in adult 2019    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HCC) 2019    Vitamin B12 deficiency 2017    Mixed hyperlipidemia 2016    Vitamin D deficiency 2016    Type 2 diabetes mellitus with diabetic neuropathy, without long-term current use of insulin ([facility]) 06/15/2016    Acquired hypothyroidism 06/15/2016    Cataract 2013    Ataxic cerebral palsy ([facility]) 2013    Generalized convulsive epilepsy ([facility]) 2013    Essential hypertension 2013    Osteoporosis 2013    Scoliosis 2013      LOS (days): 18  Geometric Mean LOS (GMLOS) (days): 3.9  Days to GMLOS:-14.8     OBJECTIVE:  Risk of Unplanned Readmission Score: 25.41         Current admission status: Inpatient   Preferred Pharmacy:   [facility] [place] - [address]  [address] [place] [address]  Phone: [phone] Fax: [phone]    [id number] - [place] - [address] [address]  Phone:  [phone] Fax: [phone]    Primary Care Provider: HUNTER Brown    Primary Insurance: MEDICARE  Secondary Insurance: [place] [employer]    DISCHARGE DETAILS:    Discharge planning discussed with:: sahron  & Madeline at the bedside  Freedom of Choice: Yes  Comments - Freedom of Choice: cm had expanded the searhcb for snf rehab - cm did call [facility] today - they had been reviewing - they are unable to offer a bed-  Madeline stated today they are working on getting the pt to a respite home in [facility] and then he will go to his permanent home - Madeline would transport [facility] pt- pt maybe able to be d/c on Thursday- they need to check if the family is able to accept the pt on that day  CM contacted family/caregiver?: Yes             Contacts  Patient Contacts: Madeline Arvizu  Relationship to Patient:: Other (Comment)  Contact Method: In Person  Reason/Outcome: Discharge Planning         DME Referral Provided  Referral made for DME?: No    Other Referral/Resources/Interventions Provided:  Interventions: Other (Specify)  Referral Comments: pt to be d/c to a respite Life sharing home    Would you like to participate in our [facility] service program?  : No - Declined    Treatment Team Recommendation:  (respite life sharing home - staff)

## 2024-03-19 NOTE — PROGRESS NOTES
[facility]  Progress Note  Name: Jairon Oconnell I  MRN: [id number]  Unit/Bed#: -01 I Date of Admission: 3/1/2024   Date of Service: 3/19/2024 I Hospital Day: 18    Assessment/Plan     * Ambulatory dysfunction  Assessment & Plan  With non-operative right hip greater trochanter fracture seen and evaluated by orthopedic surgery on the previous admission  He was discharged to a skilled nursing facility, became agitated, broke a chair, and was reported to become aggressive with staff.  The facility will now not take him back  Psychiatry recommendations appreciated. No indication for inpatient psychiatric hospitalization at this time.  Recommendation is rehab/SNF  PT/OT recommendations appreciated.  Medically clear for discharge to rehab facility   Case management following, input appreciated            Generalized convulsive epilepsy (HCC)  Assessment & Plan  Continue Depakote 1000 mg p.o. nightly and 750 mg p.o. daily, (per  must be brand name) Neurontin 400 mg p.o. 3 times daily, Vimpat 150 mg p.o. daily and 200 mg p.o. nightly, zonisamide 300 mg HS, and Keppra 1500 mg p.o. every 12 hours  Continue seizure precautions           Type 2 diabetes mellitus with diabetic neuropathy, without long-term current use of insulin (HCC)  Assessment & Plan  Lab Results   Component Value Date    HGBA1C 5.5 11/27/2023       Recent Labs     03/18/24  1059 03/18/24  1613 03/18/24  2141 03/19/24  0726   POCGLU 126 131 167* 120         Blood Sugar Average: Last 72 hrs:  (P) [id number]    Continue diabetic diet       Continue fingerstick blood sugar with sliding scale coverage  Hypoglycemia protocol    Urinary retention  Assessment & Plan  S/p chapman catheter, since discontinued   Continue tamsulosin  Continue urinary retention protocol      Recorded urinary output is QS      Pressure ulcer of sacral region, stage 3 (HCC)  Assessment & Plan  POA  Wound care recommendations  appreciated: Scarring to the left lateral buttock from stage 3 pressure injury in the past. Scarring is intact and blanchable and hyperpigmented   Turn and reposition every 2 hours, frequent offloading           Behavior concern in adult  Assessment & Plan  He has been cooperative without any agitation or behavioral issues   Seroquel XR decreased to 150 BID and 300 HS due to excessive sleepiness           Acquired hypothyroidism  Assessment & Plan  Continue levothyroxine 150 mcg p.o. every morning           Essential hypertension  Assessment & Plan  Continue lisinopril 10 mg p.o. daily  Monitor blood pressure            Mixed hyperlipidemia  Assessment & Plan  Continue Pravachol 80 mg oral daily at bedtime            Ataxic cerebral palsy (HCC)  Assessment & Plan  Continue supportive care   Continue current medications               VTE Pharmacologic Prophylaxis: VTE Score: 3 Moderate Risk (Score 3-4) - Pharmacological DVT Prophylaxis Ordered: enoxaparin (Lovenox).    Mobility:   Basic Mobility Inpatient Raw Score: 18  JH-HLM Goal: 6: Walk 10 steps or more  JH-HLM Achieved: 6: Walk 10 steps or more  JH-HLM Goal achieved. Continue to encourage appropriate mobility.    Patient Centered Rounds: I performed bedside rounds with nursing staff today.   Discussions with Specialists or Other Care Team Provider: CM    Education and Discussions with Family / Patient:  No medical updates to provide..     Total Time Spent on Date of Encounter in care of patient: 23 mins. This time was spent on one or more of the following: performing physical exam; counseling and coordination of care; obtaining or reviewing history; documenting in the medical record; reviewing/ordering tests, medications or procedures; communicating with other healthcare professionals and discussing with patient's family/caregivers.    Current Length of Stay: 18 day(s)  Current Patient Status: Inpatient   Certification Statement: The patient will continue to  require additional inpatient hospital stay due to care coordination.   Discharge Plan: Anticipate discharge in >72 hrs to D.     Code Status: Level 1 - Full Code    Subjective:   No complaints offered.    Objective:     Vitals:   Temp (24hrs), Av.7 °F (36.5 °C), Min:97.5 °F (36.4 °C), Max:97.9 °F (36.6 °C)    Temp:  [97.5 °F (36.4 °C)-97.9 °F (36.6 °C)] 97.5 °F (36.4 °C)  HR:  [66-68] 66  Resp:  [18] 18  BP: (108-143)/(75-92) 143/92  SpO2:  [96 %] 96 %  Body mass index is 21.89 kg/m².     Input and Output Summary (last 24 hours):     Intake/Output Summary (Last 24 hours) at 3/19/2024 0924  Last data filed at 3/18/2024 2246  Gross per 24 hour   Intake --   Output 450 ml   Net -450 ml       Physical Exam:   Physical Exam  Vitals and nursing note reviewed.   HENT:      Head: Normocephalic and atraumatic.      Nose: Nose normal.      Mouth/Throat:      Mouth: Mucous membranes are moist.      Pharynx: Oropharynx is clear.   Eyes:      Pupils: Pupils are equal, round, and reactive to light.   Cardiovascular:      Rate and Rhythm: Normal rate and regular rhythm.      Pulses: Normal pulses.      Heart sounds: Normal heart sounds.   Pulmonary:      Effort: Pulmonary effort is normal. No respiratory distress.      Breath sounds: Normal breath sounds. No wheezing or rhonchi.   Abdominal:      General: Bowel sounds are normal.      Palpations: Abdomen is soft.      Tenderness: There is no abdominal tenderness.   Musculoskeletal:      Cervical back: Neck supple.      Right lower leg: No edema.      Left lower leg: No edema.   Skin:     General: Skin is warm and dry.      Capillary Refill: Capillary refill takes less than 2 seconds.   Neurological:      General: No focal deficit present.      Mental Status: He is alert. Mental status is at baseline.          Additional Data:     Labs:              Results from last 7 days   Lab Units 24  0726 24  2141 24  1613 24  1059 24  0750 24  2101  03/17/24  1625 03/17/24  1103 03/17/24  0705 03/16/24  2134 03/16/24  1630 03/16/24  1555   POC GLUCOSE mg/dl 120 167* 131 126 118 154* 135 147* 140 171* 146* 146*               Lines/Drains:  Invasive Devices       None                         Last 24 Hours Medication List:   Current Facility-Administered Medications   Medication Dose Route Frequency Provider Last Rate    acetaminophen  650 mg Oral 4x Daily PRN Lizzie Jimi, CRNP      divalproex sodium  250 mg Oral Daily Lizzie Jimi, CRNP      divalproex sodium  1,000 mg Oral HS Lizzie Jimi, CRNP      divalproex sodium  500 mg Oral Daily Lizzie Jimi, CRNP      docusate sodium  100 mg Oral BID PRN Hasmukh Becker PA-C      enoxaparin  40 mg Subcutaneous Daily Hasmukh Becker, JAIR      gabapentin  400 mg Oral TID Hasmukh Becker, JAIR      ibuprofen  600 mg Oral Q6H PRN Ancelmo Mota PA-C      insulin lispro  1-5 Units Subcutaneous TID AC Hasmukh Becker, JAIR      insulin lispro  1-6 Units Subcutaneous HS Hasmukh Becker PA-C      lacosamide  150 mg Oral Daily Hasmukh Becker, JAIR      lacosamide  200 mg Oral HS Hasmukh Becker, JAIR      levETIRAcetam  1,500 mg Oral Q12H [facility] Hasmukh Becker, PA-DENIA      levothyroxine  150 mcg Oral Daily Hasmukh Becker, JAIR      lisinopril  10 mg Oral Daily Hasmukh Becker, PA-C      loratadine  10 mg Oral Daily Hasmukh Becker, JAIR      multivitamin stress formula  1 tablet Oral Daily MORENA FofanaC      OLANZapine  5 mg Intramuscular Q3H PRN Hasmukh Becker PA-C      pravastatin  80 mg Oral Daily With Dinner Hasmukh Becker PA-C      QUEtiapine  150 mg Oral BID Lizzie Jimi, NATHENNP      QUEtiapine  300 mg Oral HS Lizzie Jimi, CRNOE      tamsulosin  0.4 mg Oral Daily With Dinner Lizzie Jimi, HUNTER      temazepam  15 mg Oral HS PRN Hasmukh Becker, JAIR      zonisamide  300 mg Oral HS Hasmukh Becker PA-C          Today, Patient Was Seen By: HUNTER Main    **Please Note: This note may have been constructed using a voice  recognition system.**
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
## 2024-03-19 NOTE — ASSESSMENT & PLAN NOTE
Lab Results   Component Value Date    HGBA1C 5.5 11/27/2023       Recent Labs     03/18/24  1059 03/18/24  1613 03/18/24  2141 03/19/24  0726   POCGLU 126 131 167* 120         Blood Sugar Average: Last 72 hrs:  (P) [id number]    Continue diabetic diet       Continue fingerstick blood sugar with sliding scale coverage  Hypoglycemia protocol

## 2024-03-19 NOTE — PLAN OF CARE
Problem: PAIN - ADULT  Goal: Verbalizes/displays adequate comfort level or baseline comfort level  Description: Interventions:  - Encourage patient to monitor pain and request assistance  - Assess pain using appropriate pain scale  - Administer analgesics based on type and severity of pain and evaluate response  - Implement non-pharmacological measures as appropriate and evaluate response  - Consider cultural and social influences on pain and pain management  - Notify physician/advanced practitioner if interventions unsuccessful or patient reports new pain  Outcome: Progressing     Problem: SAFETY ADULT  Goal: Patient will remain free of falls  Description: INTERVENTIONS:  - Educate patient/family on patient safety including physical limitations  - Instruct patient to call for assistance with activity   - Consult OT/PT to assist with strengthening/mobility   - Keep Call bell within reach  - Keep bed low and locked with side rails adjusted as appropriate  - Keep care items and personal belongings within reach  - Initiate and maintain comfort rounds  - Make Fall Risk Sign visible to staff  - Offer Toileting every 2 Hours, in advance of need  - Initiate/Maintain bed/chair alarm  - Obtain necessary fall risk management equipment:   - Apply yellow socks and bracelet for high fall risk patients  - Consider moving patient to room near nurses station  Outcome: Progressing  Goal: Maintain or return to baseline ADL function  Description: INTERVENTIONS:  -  Assess patient's ability to carry out ADLs; assess patient's baseline for ADL function and identify physical deficits which impact ability to perform ADLs (bathing, care of mouth/teeth, toileting, grooming, dressing, etc.)  - Assess/evaluate cause of self-care deficits   - Assess range of motion  - Assess patient's mobility; develop plan if impaired  - Assess patient's need for assistive devices and provide as appropriate  - Encourage maximum independence but intervene  and supervise when necessary  - Involve family in performance of ADLs  - Assess for home care needs following discharge   - Consider OT consult to assist with ADL evaluation and planning for discharge  - Provide patient education as appropriate  Outcome: Progressing  Goal: Maintains/Returns to pre admission functional level  Description: INTERVENTIONS:  - Perform AM-PAC 6 Click Basic Mobility/ Daily Activity assessment daily.  - Set and communicate daily mobility goal to care team and patient/family/caregiver.   - Collaborate with rehabilitation services on mobility goals if consulted  - Perform Range of Motion 3 times a day.  - Reposition patient every 2 hours.  - Dangle patient 3 times a day  - Stand patient 3 times a day  - Ambulate patient 3 times a day  - Out of bed to chair 3 times a day   - Out of bed for meals 3 times a day  - Out of bed for toileting  - Record patient progress and toleration of activity level   Outcome: Progressing

## 2024-03-19 NOTE — PLAN OF CARE
Problem: PAIN - ADULT  Goal: Verbalizes/displays adequate comfort level or baseline comfort level  Description: Interventions:  - Encourage patient to monitor pain and request assistance  - Assess pain using appropriate pain scale  - Administer analgesics based on type and severity of pain and evaluate response  - Implement non-pharmacological measures as appropriate and evaluate response  - Consider cultural and social influences on pain and pain management  - Notify physician/advanced practitioner if interventions unsuccessful or patient reports new pain  Outcome: Progressing     Problem: INFECTION - ADULT  Goal: Absence or prevention of progression during hospitalization  Description: INTERVENTIONS:  - Assess and monitor for signs and symptoms of infection  - Monitor lab/diagnostic results  - Monitor all insertion sites, i.e. indwelling lines, tubes, and drains  - Monitor endotracheal if appropriate and nasal secretions for changes in amount and color  - [place] appropriate cooling/warming therapies per order  - Administer medications as ordered  - Instruct and encourage patient and family to use good hand hygiene technique  - Identify and instruct in appropriate isolation precautions for identified infection/condition  Outcome: Progressing  Goal: Absence of fever/infection during neutropenic period  Description: INTERVENTIONS:  - Monitor WBC    Outcome: Progressing     Problem: SAFETY ADULT  Goal: Patient will remain free of falls  Description: INTERVENTIONS:  - Educate patient/family on patient safety including physical limitations  - Instruct patient to call for assistance with activity   - Consult OT/PT to assist with strengthening/mobility   - Keep Call bell within reach  - Keep bed low and locked with side rails adjusted as appropriate  - Keep care items and personal belongings within reach  - Initiate and maintain comfort rounds  - Make Fall Risk Sign visible to staff    - Apply yellow socks and  bracelet for high fall risk patients  - Consider moving patient to room near nurses station  Outcome: Progressing  Goal: Maintain or return to baseline ADL function  Description: INTERVENTIONS:  -  Assess patient's ability to carry out ADLs; assess patient's baseline for ADL function and identify physical deficits which impact ability to perform ADLs (bathing, care of mouth/teeth, toileting, grooming, dressing, etc.)  - Assess/evaluate cause of self-care deficits   - Assess range of motion  - Assess patient's mobility; develop plan if impaired  - Assess patient's need for assistive devices and provide as appropriate  - Encourage maximum independence but intervene and supervise when necessary  - Involve family in performance of ADLs  - Assess for home care needs following discharge   - Consider OT consult to assist with ADL evaluation and planning for discharge  - Provide patient education as appropriate  Outcome: Progressing  Goal: Maintains/Returns to pre admission functional level  Description: INTERVENTIONS:  - Perform AM-PAC 6 Click Basic Mobility/ Daily Activity assessment daily.  - Set and communicate daily mobility goal to care team and patient/family/caregiver.   - Collaborate with rehabilitation services on mobility goals if consulted    - Out of bed for toileting  - Record patient progress and toleration of activity level   Outcome: Progressing

## 2024-03-20 LAB
GLUCOSE SERPL-MCNC: 132 MG/DL (ref 65–140)
GLUCOSE SERPL-MCNC: 138 MG/DL (ref 65–140)
GLUCOSE SERPL-MCNC: 159 MG/DL (ref 65–140)
GLUCOSE SERPL-MCNC: 96 MG/DL (ref 65–140)

## 2024-03-20 PROCEDURE — 97530 THERAPEUTIC ACTIVITIES: CPT

## 2024-03-20 PROCEDURE — 99231 SBSQ HOSP IP/OBS SF/LOW 25: CPT | Performed by: NURSE PRACTITIONER

## 2024-03-20 PROCEDURE — 82948 REAGENT STRIP/BLOOD GLUCOSE: CPT

## 2024-03-20 PROCEDURE — 97116 GAIT TRAINING THERAPY: CPT

## 2024-03-20 RX ADMIN — ZONISAMIDE 300 MG: 100 CAPSULE ORAL at 21:05

## 2024-03-20 RX ADMIN — GABAPENTIN 400 MG: 400 CAPSULE ORAL at 21:05

## 2024-03-20 RX ADMIN — GABAPENTIN 400 MG: 400 CAPSULE ORAL at 17:25

## 2024-03-20 RX ADMIN — LEVETIRACETAM 1500 MG: 500 TABLET, FILM COATED ORAL at 21:05

## 2024-03-20 RX ADMIN — GABAPENTIN 400 MG: 400 CAPSULE ORAL at 09:46

## 2024-03-20 RX ADMIN — PRAVASTATIN SODIUM 80 MG: 40 TABLET ORAL at 17:25

## 2024-03-20 RX ADMIN — QUETIAPINE FUMARATE 150 MG: 150 TABLET, EXTENDED RELEASE ORAL at 07:43

## 2024-03-20 RX ADMIN — QUETIAPINE FUMARATE 150 MG: 150 TABLET, EXTENDED RELEASE ORAL at 15:31

## 2024-03-20 RX ADMIN — B-COMPLEX W/ C & FOLIC ACID TAB 1 TABLET: TAB at 09:46

## 2024-03-20 RX ADMIN — ENOXAPARIN SODIUM 40 MG: 40 INJECTION SUBCUTANEOUS at 09:46

## 2024-03-20 RX ADMIN — DIVALPROEX SODIUM 1000 MG: 500 TABLET, DELAYED RELEASE ORAL at 22:09

## 2024-03-20 RX ADMIN — TAMSULOSIN HYDROCHLORIDE 0.4 MG: 0.4 CAPSULE ORAL at 17:25

## 2024-03-20 RX ADMIN — QUETIAPINE FUMARATE 300 MG: 150 TABLET, EXTENDED RELEASE ORAL at 21:05

## 2024-03-20 RX ADMIN — LEVOTHYROXINE SODIUM 150 MCG: 75 TABLET ORAL at 09:46

## 2024-03-20 RX ADMIN — DIVALPROEX SODIUM 500 MG: 500 TABLET, DELAYED RELEASE ORAL at 09:46

## 2024-03-20 RX ADMIN — LACOSAMIDE 200 MG: 150 TABLET ORAL at 21:05

## 2024-03-20 RX ADMIN — LISINOPRIL 10 MG: 10 TABLET ORAL at 09:46

## 2024-03-20 RX ADMIN — LACOSAMIDE 150 MG: 150 TABLET ORAL at 09:46

## 2024-03-20 RX ADMIN — LORATADINE 10 MG: 10 TABLET ORAL at 09:46

## 2024-03-20 RX ADMIN — LEVETIRACETAM 1500 MG: 500 TABLET, FILM COATED ORAL at 09:46

## 2024-03-20 RX ADMIN — DIVALPROEX SODIUM 250 MG: 250 TABLET, FILM COATED, EXTENDED RELEASE ORAL at 09:46

## 2024-03-20 RX ADMIN — IBUPROFEN 600 MG: 600 TABLET, FILM COATED ORAL at 07:42

## 2024-03-20 RX ADMIN — ACETAMINOPHEN 650 MG: 325 TABLET ORAL at 03:27

## 2024-03-20 RX ADMIN — INSULIN LISPRO 1 UNITS: 100 INJECTION, SOLUTION INTRAVENOUS; SUBCUTANEOUS at 21:06

## 2024-03-20 NOTE — PLAN OF CARE
Problem: PAIN - ADULT  Goal: Verbalizes/displays adequate comfort level or baseline comfort level  Description: Interventions:  - Encourage patient to monitor pain and request assistance  - Assess pain using appropriate pain scale  - Administer analgesics based on type and severity of pain and evaluate response  - Implement non-pharmacological measures as appropriate and evaluate response  - Consider cultural and social influences on pain and pain management  - Notify physician/advanced practitioner if interventions unsuccessful or patient reports new pain  Outcome: Progressing     Problem: INFECTION - ADULT  Goal: Absence or prevention of progression during hospitalization  Description: INTERVENTIONS:  - Assess and monitor for signs and symptoms of infection  - Monitor lab/diagnostic results  - Monitor all insertion sites, i.e. indwelling lines, tubes, and drains  - Monitor endotracheal if appropriate and nasal secretions for changes in amount and color  - [place] appropriate cooling/warming therapies per order  - Administer medications as ordered  - Instruct and encourage patient and family to use good hand hygiene technique  - Identify and instruct in appropriate isolation precautions for identified infection/condition  Outcome: Progressing  Goal: Absence of fever/infection during neutropenic period  Description: INTERVENTIONS:  - Monitor WBC    Outcome: Progressing     Problem: SAFETY ADULT  Goal: Patient will remain free of falls  Description: INTERVENTIONS:  - Educate patient/family on patient safety including physical limitations  - Instruct patient to call for assistance with activity   - Consult OT/PT to assist with strengthening/mobility   - Keep Call bell within reach  - Keep bed low and locked with side rails adjusted as appropriate  - Keep care items and personal belongings within reach  - Initiate and maintain comfort rounds  - Make Fall Risk Sign visible to staff  - Offer Toileting every 2 Hours,  in advance of need  - Initiate/Maintain bed alarm  - Obtain necessary fall risk management equipment: nonslip socks  - Apply yellow socks and bracelet for high fall risk patients  - Consider moving patient to room near nurses station  Outcome: Progressing  Goal: Maintain or return to baseline ADL function  Description: INTERVENTIONS:  -  Assess patient's ability to carry out ADLs; assess patient's baseline for ADL function and identify physical deficits which impact ability to perform ADLs (bathing, care of mouth/teeth, toileting, grooming, dressing, etc.)  - Assess/evaluate cause of self-care deficits   - Assess range of motion  - Assess patient's mobility; develop plan if impaired  - Assess patient's need for assistive devices and provide as appropriate  - Encourage maximum independence but intervene and supervise when necessary  - Involve family in performance of ADLs  - Assess for home care needs following discharge   - Consider OT consult to assist with ADL evaluation and planning for discharge  - Provide patient education as appropriate  Outcome: Progressing  Goal: Maintains/Returns to pre admission functional level  Description: INTERVENTIONS:  - Perform AM-PAC 6 Click Basic Mobility/ Daily Activity assessment daily.  - Set and communicate daily mobility goal to care team and patient/family/caregiver.   - Collaborate with rehabilitation services on mobility goals if consulted  - Perform Range of Motion 3 times a day.  - Reposition patient every 2 hours.  - Dangle patient 3 times a day  - Stand patient 3 times a day  - Ambulate patient 3 times a day  - Out of bed to chair 3 times a day   - Out of bed for meals 3 times a day  - Out of bed for toileting  - Record patient progress and toleration of activity level   Outcome: Progressing     Problem: DISCHARGE PLANNING  Goal: Discharge to home or other facility with appropriate resources  Description: INTERVENTIONS:  - Identify barriers to discharge w/patient and  caregiver  - Arrange for needed discharge resources and transportation as appropriate  - Identify discharge learning needs (meds, wound care, etc.)  - Arrange for interpretive services to assist at discharge as needed  - Refer to Case Management Department for coordinating discharge planning if the patient needs post-hospital services based on physician/advanced practitioner order or complex needs related to functional status, cognitive ability, or social support system  Outcome: Progressing     Problem: Knowledge Deficit  Goal: Patient/family/caregiver demonstrates understanding of disease process, treatment plan, medications, and discharge instructions  Description: Complete learning assessment and assess knowledge base.  Interventions:  - Provide teaching at level of understanding  - Provide teaching via preferred learning methods  Outcome: Progressing     Problem: Prexisting or High Potential for Compromised Skin Integrity  Goal: Skin integrity is maintained or improved  Description: INTERVENTIONS:  - Identify patients at risk for skin breakdown  - Assess and monitor skin integrity  - Assess and monitor nutrition and hydration status  - Monitor labs   - Assess for incontinence   - Turn and reposition patient  - Assist with mobility/ambulation  - Relieve pressure over bony prominences  - Avoid friction and shearing  - Provide appropriate hygiene as needed including keeping skin clean and dry  - Evaluate need for skin moisturizer/barrier cream  - Collaborate with interdisciplinary team   - Patient/family teaching  - Consider wound care consult   Outcome: Progressing

## 2024-03-20 NOTE — PLAN OF CARE
Problem: PHYSICAL THERAPY ADULT  Goal: Performs mobility at highest level of function for planned discharge setting.  See evaluation for individualized goals.  Description: Treatment/Interventions: Functional transfer training, LE strengthening/ROM, Elevations, Therapeutic exercise, Endurance training, Cognitive reorientation, Patient/family training, Equipment eval/education, Bed mobility, Spoke to nursing, Spoke to case management          See flowsheet documentation for full assessment, interventions and recommendations.  Outcome: Progressing  Note: Prognosis: Fair  Problem List: Decreased strength, Decreased endurance, Decreased mobility, Decreased coordination, Decreased cognition, Impaired judgement, Decreased safety awareness, Pain, Orthopedic restrictions  Assessment: Pt seen for PT treatment session this date, consisting of ther act focused on bed mobility and functional transfer training and gt training on level surfaces to improve pt safety in household environment. Since previous session, pt has made minimal progress in terms of progression to OOB mobility tolerance with use of RW. Pertinent barriers during this session include cognitive status and intermittent agitation c +combativeness . Current goals and POC established on IE remain appropriate, pt continues to have rehab potential and is making fair progress towards STGs. Pt prognosis for achieving goals is fair, pending pt progress with hospitalization/medical status improvements, and indicated by supportive family/caregivers. Pt limited d/t inability to concentrate under maximum structure, lack of ability to demonstrate mobility and/or self-care activities, and agitation . Pt continues to be functioning below baseline level, and remains limited 2* factors listed above. PT will continue to see pt during current hospitalization in order to address the deficits listed above and provide interventions consistent w/ POC in effort to achieve STGs. PT  recommends level 2, moderate resource intensity upon discharge.  Barriers to Discharge: Other (Comment) (pt resides in a group home setting)     Rehab Resource Intensity Level, PT: II (Moderate Resource Intensity)    See flowsheet documentation for full assessment.

## 2024-03-20 NOTE — CASE MANAGEMENT
Case Management Discharge Planning Note    Patient name Jairon Oconnell  Location /-01 MRN [id number]  : 1965 Date 3/20/2024       Current Admission Date: 3/1/2024  Current Admission Diagnosis:Ambulatory dysfunction   Patient Active Problem List    Diagnosis Date Noted    Urinary retention 2024    Nondisplaced fracture of greater trochanter of right femur, subsequent encounter for closed fracture with routine healing 2024    Closed nondisplaced fracture of proximal phalanx of left index finger with routine healing, subsequent encounter 10/30/2023    Pressure ulcer of sacral region, stage 3 (HCC) 2023    Ambulatory dysfunction 2022    Social problem 2021    Hypomagnesemia 2021    Thrombocytopathia ([facility]) 2020    Hyponatremia 2019    Metabolic encephalopathy 2019    Seborrheic dermatitis of scalp 2019    Nearsightedness 2019    Behavior concern in adult 2019    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HCC) 2019    Vitamin B12 deficiency 2017    Mixed hyperlipidemia 2016    Vitamin D deficiency 2016    Type 2 diabetes mellitus with diabetic neuropathy, without long-term current use of insulin ([facility]) 06/15/2016    Acquired hypothyroidism 06/15/2016    Cataract 2013    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    Generalized convulsive epilepsy ([facility]) 2013    Essential hypertension 2013    Osteoporosis 2013    Scoliosis 2013      LOS (days): 19  Geometric Mean LOS (GMLOS) (days): 3.9  Days to GMLOS:-15.8     OBJECTIVE:  Risk of Unplanned Readmission Score: 25.74         Current admission status: Inpatient   Preferred Pharmacy:   [facility] [place] - [address]  [address] [address]  Phone: [phone] Fax: [phone]    [id number] [place] - [address] [address]  Phone:  [phone] Fax: [phone]    Primary Care Provider: HUNTER Brown    Primary Insurance: MEDICARE  Secondary Insurance: [place] [employer]    DISCHARGE DETAILS:    Discharge planning discussed with:: patient & Madeline at the bedside  Freedom of Choice: Yes  Comments - Freedom of Choice: pt has been accepted to a respite home- in [place]- for 9 days-  pt was not cooperative with his  or therapy today- Madeline is not sure he is a good fit for the place that is willing to take him for a respite stay-pt was to stay there for about 8days until he can go to his new permanent life sharing home  CM contacted family/caregiver?: Yes             Contacts  Patient Contacts: Madeline Arvizu  Relationship to Patient:: Other (Comment) (spectrum )  Contact Method: Phone, In Person  Phone Number: [phone]  Reason/Outcome: Discharge Planning    Requested Home Health Care         Is the patient interested in HHC at discharge?: No    DME Referral Provided  Referral made for DME?: No    Other Referral/Resources/Interventions Provided:  Referral Comments: pt needs a new life sharing home-  pt was accepted to a home for a respite stay-  pt may not be able to go there now due to his behaviors today    Would you like to participate in our [facility] service program?  : No - Declined    Treatment Team Recommendation:  (d/c plan tbd- tbd)

## 2024-03-20 NOTE — PHYSICAL THERAPY NOTE
"Physical Therapy Treatment Note       03/20/24 1533   PT Last Visit   PT Visit Date 03/20/24   Note Type   Note Type Treatment   Pain Assessment   Pain Assessment Tool FLACC   Pain Rating: FLACC (Rest) - Face 0   Pain Rating: FLACC (Rest) - Legs 0   Pain Rating: FLACC (Rest) - Activity 0   Pain Rating: FLACC (Rest) - Cry 0   Pain Rating: FLACC (Rest) - Consolability 0   Score: FLACC (Rest) 0   Pain Rating: FLACC (Activity) - Face 0   Pain Rating: FLACC (Activity) - Legs 0   Pain Rating: FLACC (Activity) - Activity 0   Pain Rating: FLACC (Activity) - Cry 0   Pain Rating: FLACC (Activity) - Consolability 0   Score: FLACC (Activity) 0   Restrictions/Precautions   Weight Bearing Precautions Per Order No   Other Precautions Agitated;Cognitive;Chair Alarm;Bed Alarm;Fall Risk   General   Chart Reviewed Yes   Response to Previous Treatment Patient unable to report, no changes reported from family or staff   Family/Caregiver Present Yes   Cognition   Arousal/Participation Alert;Uncooperative   Attention Difficulty attending to directions   Orientation Level Oriented to person   Memory Unable to assess   Following Commands Follows one step commands with increased time or repetition   Comments pt initially agitated, increased cooperation as session progressed   Subjective   Subjective \"I need to use the bathroom\"   Bed Mobility   Supine to Sit 5  Supervision   Additional items Assist x 1   Sit to Supine 5  Supervision   Additional items Assist x 1   Transfers   Sit to Stand 5  Supervision   Additional items Assist x 1   Stand to Sit 5  Supervision   Additional items Assist x 1   Toilet transfer 5  Supervision   Additional items Assist x 1;Standard toilet;Increased time required   Ambulation/Elevation   Gait pattern Improper Weight shift;Forward Flexion;Narrow GORGE;Decreased foot clearance;Shuffling   Gait Assistance 5  Supervision   Additional items Assist x 1   Assistive Device Rolling walker   Distance 16 ft x 2   Stair " Management Assistance Not tested   Balance   Static Sitting Good   Dynamic Sitting Fair +   Static Standing Fair -   Dynamic Standing Fair -   Ambulatory Fair -   Endurance Deficit   Endurance Deficit Yes   Activity Tolerance   Activity Tolerance Treatment limited secondary to agitation   Medical Staff Made Aware GORDON Reyna   Nurse Made Aware Yes, RN made aware   Assessment   Prognosis Fair   Problem List Decreased strength;Decreased endurance;Decreased mobility;Decreased coordination;Decreased cognition;Impaired judgement;Decreased safety awareness;Pain;Orthopedic restrictions   Assessment Pt seen for PT treatment session this date, consisting of ther act focused on bed mobility and functional transfer training and gt training on level surfaces to improve pt safety in household environment. Since previous session, pt has made minimal progress in terms of progression to OOB mobility tolerance with use of RW. Pertinent barriers during this session include cognitive status and intermittent agitation c +combativeness . Current goals and POC established on IE remain appropriate, pt continues to have rehab potential and is making fair progress towards STGs. Pt prognosis for achieving goals is fair, pending pt progress with hospitalization/medical status improvements, and indicated by supportive family/caregivers. Pt limited d/t inability to concentrate under maximum structure, lack of ability to demonstrate mobility and/or self-care activities, and agitation . Pt continues to be functioning below baseline level, and remains limited 2* factors listed above. PT will continue to see pt during current hospitalization in order to address the deficits listed above and provide interventions consistent w/ POC in effort to achieve STGs. PT recommends level 2, moderate resource intensity upon discharge.   Goals   Patient Goals none expressed by pt re: therapy outcomes d/t cognitive impairment   LTG Expiration Date 03/30/24   Long  Term Goal #1 goals remain appropriate   PT Treatment Day 2   Plan   Treatment/Interventions Functional transfer training;LE strengthening/ROM;Therapeutic exercise;Endurance training;Cognitive reorientation;Patient/family training;Bed mobility;Continued evaluation;Spoke to nursing;OT   Progress Slow progress, decreased activity tolerance   PT Frequency 3-5x/wk   Discharge Recommendation   Rehab Resource Intensity Level, PT II (Moderate Resource Intensity)   Equipment Recommended   (continue RW use)   Walker Package Recommended Wheeled walker   Additional Comments Pt's raw score on the AM-[facility] Basic Mobility inpatient short form is 19, standardized score is 42.48. Patients at this level are likely to benefit from DC to home with home health care, however, please refer to therapist recommendation for safe DC planning.   AM-[facility] Basic Mobility Inpatient   Turning in Flat Bed Without Bedrails 4   Lying on Back to Sitting on Edge of Flat Bed Without Bedrails 4   Moving Bed to Chair 3   Standing Up From Chair Using Arms 3   Walk in Room 3   Climb 3-5 Stairs With Railing 2   Basic Mobility Inpatient Raw Score 19   Basic Mobility Standardized Score 42.48   [facility] Highest Level Of Mobility   JH-HLM Goal 6: Walk 10 steps or more   JH-HLM Achieved 6: Walk 10 steps or more   Education   Education Provided Mobility training;Assistive device   Patient Reinforcement needed       Annabel Dorado, PT, DPT    Time of PT treatment session: 6686-1617 (total treatment time = 23 minutes)
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
## 2024-03-20 NOTE — ASSESSMENT & PLAN NOTE
Lab Results   Component Value Date    HGBA1C 5.5 11/27/2023       Recent Labs     03/23/24  1045 03/23/24  1609 03/23/24 2055 03/24/24  0748   POCGLU 144* 142* 153* 138       Blood Sugar Average: Last 72 hrs:  (P) 150.0916889648520363    Continue diabetic diet        Continue fingerstick blood sugar with sliding scale coverage  Hypoglycemia protocol

## 2024-03-20 NOTE — PLAN OF CARE
Problem: PAIN - ADULT  Goal: Verbalizes/displays adequate comfort level or baseline comfort level  Description: Interventions:  - Encourage patient to monitor pain and request assistance  - Assess pain using appropriate pain scale  - Administer analgesics based on type and severity of pain and evaluate response  - Implement non-pharmacological measures as appropriate and evaluate response  - Consider cultural and social influences on pain and pain management  - Notify physician/advanced practitioner if interventions unsuccessful or patient reports new pain  Outcome: Progressing     Problem: INFECTION - ADULT  Goal: Absence or prevention of progression during hospitalization  Description: INTERVENTIONS:  - Assess and monitor for signs and symptoms of infection  - Monitor lab/diagnostic results  - Monitor all insertion sites, i.e. indwelling lines, tubes, and drains  - Monitor endotracheal if appropriate and nasal secretions for changes in amount and color  - Butler appropriate cooling/warming therapies per order  - Administer medications as ordered  - Instruct and encourage patient and family to use good hand hygiene technique  - Identify and instruct in appropriate isolation precautions for identified infection/condition  Outcome: Progressing  Goal: Absence of fever/infection during neutropenic period  Description: INTERVENTIONS:  - Monitor WBC    Outcome: Progressing     Problem: SAFETY ADULT  Goal: Patient will remain free of falls  Description: INTERVENTIONS:  - Educate patient/family on patient safety including physical limitations  - Instruct patient to call for assistance with activity   - Consult OT/PT to assist with strengthening/mobility   - Keep Call bell within reach  - Keep bed low and locked with side rails adjusted as appropriate  - Keep care items and personal belongings within reach  - Initiate and maintain comfort rounds  - Make Fall Risk Sign visible to staff  - Offer Toileting every 4 Hours,  in advance of need  - Initiate/Maintain bed alarm  - Obtain necessary fall risk management equipment: .  - Apply yellow socks and bracelet for high fall risk patients  - Consider moving patient to room near nurses station  Outcome: Progressing  Goal: Maintain or return to baseline ADL function  Description: INTERVENTIONS:  -  Assess patient's ability to carry out ADLs; assess patient's baseline for ADL function and identify physical deficits which impact ability to perform ADLs (bathing, care of mouth/teeth, toileting, grooming, dressing, etc.)  - Assess/evaluate cause of self-care deficits   - Assess range of motion  - Assess patient's mobility; develop plan if impaired  - Assess patient's need for assistive devices and provide as appropriate  - Encourage maximum independence but intervene and supervise when necessary  - Involve family in performance of ADLs  - Assess for home care needs following discharge   - Consider OT consult to assist with ADL evaluation and planning for discharge  - Provide patient education as appropriate  Outcome: Progressing  Goal: Maintains/Returns to pre admission functional level  Description: INTERVENTIONS:  - Perform AM-PAC 6 Click Basic Mobility/ Daily Activity assessment daily.  - Set and communicate daily mobility goal to care team and patient/family/caregiver.   - Collaborate with rehabilitation services on mobility goals if consulted  - Perform Range of Motion 3 times a day.  - Reposition patient every 2 hours.  - Dangle patient 3 times a day  - Stand patient 3 times a day  - Ambulate patient 3 times a day  - Out of bed to chair 3 times a day   - Out of bed for meals 3 times a day  - Out of bed for toileting  - Record patient progress and toleration of activity level   Outcome: Progressing

## 2024-03-20 NOTE — OCCUPATIONAL THERAPY NOTE
03/20/24 1200   Note Type   Note Type Cancelled Session   Cancel Reasons Refusal       Attempted OT treatment x 2 today, at 0910 and at 1145. Both attempts patient adamantly refused, becoming agitated and yelling. Will continue with POC as able.  Zeenat Hernandez

## 2024-03-20 NOTE — ASSESSMENT & PLAN NOTE
Continue Depakote  mg daily, Depakote  mg daily, Depakote DR 1000 mg HS (per  must be brand name) Neurontin 400 mg 3 times daily, Vimpat 150 mg daily and 200 mg HS, zonisamide 300 mg HS, and Keppra 1500 mg every 12 hours.   Continue seizure precautions

## 2024-03-21 LAB
GLUCOSE SERPL-MCNC: 128 MG/DL (ref 65–140)
GLUCOSE SERPL-MCNC: 144 MG/DL (ref 65–140)
GLUCOSE SERPL-MCNC: 154 MG/DL (ref 65–140)
GLUCOSE SERPL-MCNC: 163 MG/DL (ref 65–140)

## 2024-03-21 PROCEDURE — 99231 SBSQ HOSP IP/OBS SF/LOW 25: CPT | Performed by: NURSE PRACTITIONER

## 2024-03-21 PROCEDURE — 82948 REAGENT STRIP/BLOOD GLUCOSE: CPT

## 2024-03-21 RX ADMIN — QUETIAPINE FUMARATE 150 MG: 150 TABLET, EXTENDED RELEASE ORAL at 14:35

## 2024-03-21 RX ADMIN — LEVETIRACETAM 1500 MG: 500 TABLET, FILM COATED ORAL at 21:00

## 2024-03-21 RX ADMIN — TAMSULOSIN HYDROCHLORIDE 0.4 MG: 0.4 CAPSULE ORAL at 15:52

## 2024-03-21 RX ADMIN — QUETIAPINE FUMARATE 150 MG: 150 TABLET, EXTENDED RELEASE ORAL at 09:17

## 2024-03-21 RX ADMIN — DIVALPROEX SODIUM 250 MG: 250 TABLET, FILM COATED, EXTENDED RELEASE ORAL at 09:16

## 2024-03-21 RX ADMIN — LACOSAMIDE 150 MG: 150 TABLET ORAL at 09:17

## 2024-03-21 RX ADMIN — IBUPROFEN 600 MG: 600 TABLET, FILM COATED ORAL at 15:52

## 2024-03-21 RX ADMIN — ENOXAPARIN SODIUM 40 MG: 40 INJECTION SUBCUTANEOUS at 09:16

## 2024-03-21 RX ADMIN — LACOSAMIDE 200 MG: 150 TABLET ORAL at 21:00

## 2024-03-21 RX ADMIN — GABAPENTIN 400 MG: 400 CAPSULE ORAL at 15:52

## 2024-03-21 RX ADMIN — QUETIAPINE FUMARATE 300 MG: 150 TABLET, EXTENDED RELEASE ORAL at 21:00

## 2024-03-21 RX ADMIN — LEVETIRACETAM 1500 MG: 500 TABLET, FILM COATED ORAL at 09:17

## 2024-03-21 RX ADMIN — LORATADINE 10 MG: 10 TABLET ORAL at 09:18

## 2024-03-21 RX ADMIN — PRAVASTATIN SODIUM 80 MG: 40 TABLET ORAL at 15:52

## 2024-03-21 RX ADMIN — LISINOPRIL 10 MG: 10 TABLET ORAL at 09:18

## 2024-03-21 RX ADMIN — INSULIN LISPRO 1 UNITS: 100 INJECTION, SOLUTION INTRAVENOUS; SUBCUTANEOUS at 09:15

## 2024-03-21 RX ADMIN — GABAPENTIN 400 MG: 400 CAPSULE ORAL at 21:00

## 2024-03-21 RX ADMIN — DIVALPROEX SODIUM 1000 MG: 500 TABLET, DELAYED RELEASE ORAL at 21:00

## 2024-03-21 RX ADMIN — GABAPENTIN 400 MG: 400 CAPSULE ORAL at 09:17

## 2024-03-21 RX ADMIN — ZONISAMIDE 300 MG: 100 CAPSULE ORAL at 21:00

## 2024-03-21 RX ADMIN — INSULIN LISPRO 1 UNITS: 100 INJECTION, SOLUTION INTRAVENOUS; SUBCUTANEOUS at 18:40

## 2024-03-21 RX ADMIN — B-COMPLEX W/ C & FOLIC ACID TAB 1 TABLET: TAB at 09:18

## 2024-03-21 RX ADMIN — LEVOTHYROXINE SODIUM 150 MCG: 75 TABLET ORAL at 09:17

## 2024-03-21 RX ADMIN — DIVALPROEX SODIUM 500 MG: 500 TABLET, DELAYED RELEASE ORAL at 09:17

## 2024-03-21 NOTE — PROGRESS NOTES
[facility]  Progress Note  Name: Jairon Oconnell I  MRN: [id number]  Unit/Bed#: -01 I Date of Admission: 3/1/2024   Date of Service: 3/21/2024 I Hospital Day: 20    Assessment/Plan   * Ambulatory dysfunction  Assessment & Plan  With non-operative right hip greater trochanter fracture seen and evaluated by orthopedic surgery on the previous admission  He was discharged to a skilled nursing facility, became agitated, broke a chair, and was reported to become aggressive with staff.  The facility will now not take him back  Psychiatry recommendations appreciated. No indication for inpatient psychiatric hospitalization at this time.  Recommendation is rehab/SNF  PT/OT recommendations appreciated.  Medically clear for discharge to rehab facility   Case management following, input appreciated              Urinary retention  Assessment & Plan  S/p chapman catheter, since discontinued   Continue tamsulosin  Continue urinary retention protocol            Pressure ulcer of sacral region, stage 3 (HCC)  Assessment & Plan  POA  Wound care recommendations appreciated: Scarring to the left lateral buttock from stage 3 pressure injury in the past. Scarring is intact and blanchable and hyperpigmented   Turn and reposition every 2 hours, frequent offloading             Behavior concern in adult  Assessment & Plan  He has been cooperative without any agitation or behavioral issues   Seroquel XR decreased to 150 BID and 300 HS due to excessive sleepiness             Acquired hypothyroidism  Assessment & Plan  Continue levothyroxine 150 mcg p.o. every morning              Essential hypertension  Assessment & Plan  Continue lisinopril 10 mg daily  Monitor blood pressure              Mixed hyperlipidemia  Assessment & Plan  Continue Pravachol 80 mg oral daily at bedtime              Generalized convulsive epilepsy (HCC)  Assessment & Plan  Continue Depakote  mg daily, Depakote  mg daily, Depakote DR  1000 mg HS (per  must be brand name) Neurontin 400 mg 3 times daily, Vimpat 150 mg daily and 200 mg HS, zonisamide 300 mg HS, and Keppra 1500 mg every 12 hours.   Continue seizure precautions             Type 2 diabetes mellitus with diabetic neuropathy, without long-term current use of insulin (HCC)  Assessment & Plan  Lab Results   Component Value Date    HGBA1C 5.5 11/27/2023       Recent Labs     03/20/24  1610 03/20/24  2053 03/21/24  1139 03/21/24  1619   POCGLU 138 159* 154* 163*       Blood Sugar Average: Last 72 hrs:  (P) 144.5    Continue diabetic diet       Continue fingerstick blood sugar with sliding scale coverage  Hypoglycemia protocol      Ataxic cerebral palsy (HCC)  Assessment & Plan  Continue supportive care   Continue current medications                       VTE Pharmacologic Prophylaxis: VTE Score: 3 Moderate Risk (Score 3-4) - Pharmacological DVT Prophylaxis Ordered: enoxaparin (Lovenox).    Mobility:   Basic Mobility Inpatient Raw Score: 17  JH-HLM Goal: 5: Stand one or more mins  JH-HLM Achieved: 7: Walk 25 feet or more  HLM Goal NOT achieved. Continue with multidisciplinary rounding and encourage appropriate mobility to improve upon HLM goals.    Patient Centered Rounds: I performed bedside rounds with nursing staff today.   Discussions with Specialists or Other Care Team Provider: CM, OT/PT     Education and Discussions with Family / Patient:  patient was updated.     Total Time Spent on Date of Encounter in care of patient: 22 mins. This time was spent on one or more of the following: performing physical exam; counseling and coordination of care; obtaining or reviewing history; documenting in the medical record; reviewing/ordering tests, medications or procedures; communicating with other healthcare professionals and discussing with patient's family/caregivers.    Current Length of Stay: 20 day(s)  Current Patient Status: Inpatient   Certification Statement: The patient  will continue to require additional inpatient hospital stay due to ambulatory gait dysfunction  Discharge Plan: Anticipate discharge in >72 hrs to rehab facility.CM is in the process of securing a facility for discharge.     Code Status: Level 1 - Full Code    Subjective:   The patient was seen and examined.  No acute event overnight. CM reports that the patient was not very cooperative yesterday when he was evaluated for placement.     Objective:     Vitals:   Temp (24hrs), Av.2 °F (36.2 °C), Min:97.2 °F (36.2 °C), Max:97.2 °F (36.2 °C)    Temp:  [97.2 °F (36.2 °C)] 97.2 °F (36.2 °C)  HR:  [80] 80  Resp:  [18] 18  BP: (139-155)/(84-86) 139/86  SpO2:  [97 %] 97 %  Body mass index is 22.42 kg/m².     Input and Output Summary (last 24 hours):     Intake/Output Summary (Last 24 hours) at 3/21/2024 1705  Last data filed at 3/21/2024 1401  Gross per 24 hour   Intake 1360 ml   Output --   Net 1360 ml           Physical Exam:   Physical Exam  Vitals and nursing note reviewed.   Constitutional:       General: He is not in acute distress.     Appearance: Normal appearance.      Comments: Frail and appearing older than age   HENT:      Head: Normocephalic and atraumatic.      Right Ear: External ear normal.      Left Ear: External ear normal.      Nose: Nose normal.      Mouth/Throat:      Mouth: Mucous membranes are moist.      Pharynx: Oropharynx is clear.   Eyes:      General:         Right eye: No discharge.         Left eye: No discharge.      Extraocular Movements: Extraocular movements intact.      Pupils: Pupils are equal, round, and reactive to light.   Cardiovascular:      Rate and Rhythm: Normal rate and regular rhythm.      Pulses: Normal pulses.      Heart sounds: Normal heart sounds. No murmur heard.  Pulmonary:      Effort: Pulmonary effort is normal. No respiratory distress.      Breath sounds: Normal breath sounds. No wheezing or rales.   Abdominal:      General: Bowel sounds are normal. There is no  distension.      Palpations: Abdomen is soft. There is no mass.      Tenderness: There is no abdominal tenderness.   Musculoskeletal:         General: No swelling, tenderness or deformity. Normal range of motion.      Cervical back: Normal range of motion and neck supple. No rigidity.   Skin:     General: Skin is warm and dry.      Capillary Refill: Capillary refill takes less than 2 seconds.      Coloration: Skin is not pale.      Findings: No erythema.   Neurological:      Mental Status: He is alert. Mental status is at baseline.      Comments: With periods of forgetfulness/confusion. Upper extremities tremors and ataxia. At baseline.    Psychiatric:         Attention and Perception: Attention normal.         Mood and Affect: Affect is flat.         Behavior: Behavior normal. Behavior is cooperative.         Cognition and Memory: Cognition is impaired.       Additional Data:     Labs:                      Results from last 7 days   Lab Units 03/21/24  1619 03/21/24  1139 03/20/24  2053 03/20/24  1610 03/20/24  1101 03/20/24  0721 03/19/24  2053 03/19/24  1616 03/19/24  1119 03/19/24  0726 03/18/24  2141 03/18/24  1613   POC GLUCOSE mg/dl 163* 154* 159* 138 96 132 195* 160* 164* 120 167* 131               Lines/Drains:  Invasive Devices       None                 Urinary Catheter:  Goal for removal: Voiding trial when ambulation improves               Imaging: Reviewed radiology reports from this admission including: CT head and xray(s)    Recent Cultures (last 7 days):         Last 24 Hours Medication List:   Current Facility-Administered Medications   Medication Dose Route Frequency Provider Last Rate    acetaminophen  650 mg Oral 4x Daily PRN Lizzie Jimi, CRNP      divalproex sodium  250 mg Oral Daily Lizzie Jimi, CRNP      divalproex sodium  1,000 mg Oral HS Lizzie Jimi, NATHENNP      divalproex sodium  500 mg Oral Daily HUNTER Lopez      docusate sodium  100 mg Oral BID PRN Hasmukh Becker  PA-C      enoxaparin  40 mg Subcutaneous Daily Hasmukh Becker, PA-DENIA      gabapentin  400 mg Oral TID Hasmukh Becker, PA-DENIA      ibuprofen  600 mg Oral Q6H PRN Ancelmo Mota, PA-DENIA      insulin lispro  1-5 Units Subcutaneous TID AC Hasmukh Becker, PA-DENIA      insulin lispro  1-6 Units Subcutaneous HS Hasmukh Becker, PA-DENIA      lacosamide  150 mg Oral Daily Hasmukh Becker, PA-C      lacosamide  200 mg Oral HS Hasmukh Becker, PA-DENIA      levETIRAcetam  1,500 mg Oral Q12H RONALD Hasmukh Becker, PA-DENIA      levothyroxine  150 mcg Oral Daily Hasmukh Becker, JAIR      lisinopril  10 mg Oral Daily Hasmukh Becker, PA-DENIA      loratadine  10 mg Oral Daily Hasmukh Becker, PA-DENIA      multivitamin stress formula  1 tablet Oral Daily Hasmukh Becker, JAIR      OLANZapine  5 mg Intramuscular Q3H PRN Hasmukh Becker, JAIR      pravastatin  80 mg Oral Daily With Dinner Hasmukh Becker, JAIR      QUEtiapine  150 mg Oral BID HUNTER Lopez      QUEtiapine  300 mg Oral HS HUNTER Lopez      tamsulosin  0.4 mg Oral Daily With Dinner HUNTER Lopez      temazepam  15 mg Oral HS PRN Hasmukh Becker, JAIR      zonisamide  300 mg Oral HS Hasmukh Becker, JAIR          Today, Patient Was Seen By: HUNTER Lopez    **Please Note: This note may have been constructed using a voice recognition system.**

## 2024-03-21 NOTE — PHYSICIAN ADVISOR
Current patient class: Inpatient  The patient is currently on Hospital Day: 21      The patient was admitted to the hospital at  2:13 AM on 3/1/24 for the following diagnosis:  Ambulatory dysfunction [R26.2]     CMS OUTLIER STAY REVIEW    After review of the relevant documentation, the patient is appropriate for CONTINUED INPATIENT ADMISSION.     The patient has surpassed the expected duration of stay and this review is conducted at 20 days, to help satisfy the requirements for significant outlier stay review as per CMS.  Given the current condition of this patient, the patient satisfies this review was determination for continued inpatient stay.    Rationale is as follows:    The patient had a right hip greater trochanter fracture and was discharged to SNF.  He became agitated and aggressive with staff and return to the hospital as the facility could not accept him back.  He now needs a new life sharing home.  He has been accepted to a home for respite until he can go to his new permanent life sharing home.  He was noted however yesterday be uncooperative.  Today may be a possibility for discharge but this is not clear.  With therapy yesterday he was continuing to function below baseline and level 2 moderate resource intensity upon discharge was recommended.  The patient will be discharged once there is an [facility] filled facility that can accommodate him.    The patient’s vitals on arrival were   ED Triage Vitals   Temperature Pulse Respirations Blood Pressure SpO2   03/01/24 0216 03/01/24 0216 03/01/24 0216 03/01/24 0216 03/01/24 0216   (!) 96.8 °F (36 °C) 83 18 108/63 93 %      Temp Source Heart Rate Source Patient Position - Orthostatic VS BP Location FiO2 (%)   03/01/24 0216 03/15/24 0800 03/04/24 0710 03/04/24 0710 --   Axillary Monitor Lying Right arm       Pain Score       03/01/24 0216       No Pain           Past Medical History:   Diagnosis Date    ADRIANA (acute kidney injury) (HCC) 9/24/2019    Bacteremia  "due to Gram-positive bacteria 9/7/2021    Buttock wound, left, subsequent encounter 11/5/2021    COVID-19     CP (cerebral palsy) ([facility])     Depression     Diabetes ([facility])     Disease of thyroid gland     Gait disorder     Gluteal abscess 9/6/2021    Hyperlipidemia     Hypertension     Kidney failure     Kidney stones     Moderate protein-calorie malnutrition ([facility]) 12/22/2021    Osteoporosis     Pressure injury of left buttock, stage 4 ([facility]) 3/9/2022    Psychiatric disorder     Scoliosis     Seizures ([facility])     Sepsis ([facility]) 9/25/2019    Thyroid disease     UTI (urinary tract infection)      Past Surgical History:   Procedure Laterality Date    APPENDECTOMY      IR PICC PLACEMENT SINGLE LUMEN  9/13/2021    IR PICC PLACEMENT SINGLE LUMEN  9/16/2021           Consults have been placed to:   IP CONSULT TO CASE MANAGEMENT  IP CONSULT TO PSYCHIATRY    Vitals:    03/20/24 1613 03/20/24 2300 03/21/24 0641 03/21/24 0646   BP: 136/87 155/84  139/86   BP Location:    Right arm   Pulse:  80 80    Resp:  18  18   Temp:  (!) 97.2 °F (36.2 °C)  (!) 97.2 °F (36.2 °C)   TempSrc:  Oral  Oral   SpO2:  97% 97% 97%   Weight:       Height:           Most recent labs:    No results for input(s): \"WBC\", \"HGB\", \"HCT\", \"PLT\", \"K\", \"NA\", \"CALCIUM\", \"BUN\", \"CREATININE\", \"LIPASE\", \"AMYLASE\", \"INR\", \"TROPONINI\", \"CKTOTAL\", \"AST\", \"ALT\", \"ALKPHOS\", \"BILITOT\" in the last 72 hours.    Scheduled Meds:  Current Facility-Administered Medications   Medication Dose Route Frequency Provider Last Rate    acetaminophen  650 mg Oral 4x Daily PRN Lizzie Jimi, CRNP      divalproex sodium  250 mg Oral Daily Lizzie Jimi, CRNP      divalproex sodium  1,000 mg Oral HS Lizzie Jimi, CRNP      divalproex sodium  500 mg Oral Daily Lizzie Jimi, CRNP      docusate sodium  100 mg Oral BID PRN Hasmukh Becker PA-C      enoxaparin  40 mg Subcutaneous Daily Hasmukh Becker PA-C      gabapentin  400 mg Oral TID Hasmukh Becker PA-C      ibuprofen  600 mg Oral Q6H " PRN Ancelmo Mota PA-C      insulin lispro  1-5 Units Subcutaneous TID AC Hasmukh Becker, JAIR      insulin lispro  1-6 Units Subcutaneous HS Hasmukh Becker PA-C      lacosamide  150 mg Oral Daily Hasmukh Becker, JAIR      lacosamide  200 mg Oral HS Hasmukh Becker PA-C      levETIRAcetam  1,500 mg Oral Q12H RONALD Hasmukh Becker PA-C      levothyroxine  150 mcg Oral Daily Hasmukh Becker PA-C      lisinopril  10 mg Oral Daily Hasmukh Becker PA-C      loratadine  10 mg Oral Daily Hasmukh Becker PA-C      multivitamin stress formula  1 tablet Oral Daily Hasmukh Becker PA-C      OLANZapine  5 mg Intramuscular Q3H PRN Hasmukh Becker PA-C      pravastatin  80 mg Oral Daily With Dinner Hasmukh Becker PA-C      QUEtiapine  150 mg Oral BID Lizzie Jimi, HUNTER      QUEtiapine  300 mg Oral HS HUNTER Lopez      tamsulosin  0.4 mg Oral Daily With Dinner Lizzie HUNTER Krause      temazepam  15 mg Oral HS PRN Hasmukh Becker PA-C      zonisamide  300 mg Oral HS Hasmukh Becker PA-C       Continuous Infusions:   PRN Meds:.  acetaminophen    docusate sodium    ibuprofen    OLANZapine    temazepam    Surgical procedures (if appropriate):

## 2024-03-21 NOTE — PLAN OF CARE
Problem: PAIN - ADULT  Goal: Verbalizes/displays adequate comfort level or baseline comfort level  Description: Interventions:  - Encourage patient to monitor pain and request assistance  - Assess pain using appropriate pain scale  - Administer analgesics based on type and severity of pain and evaluate response  - Implement non-pharmacological measures as appropriate and evaluate response  - Consider cultural and social influences on pain and pain management  - Notify physician/advanced practitioner if interventions unsuccessful or patient reports new pain  Outcome: Progressing     Problem: INFECTION - ADULT  Goal: Absence or prevention of progression during hospitalization  Description: INTERVENTIONS:  - Assess and monitor for signs and symptoms of infection  - Monitor lab/diagnostic results  - Monitor all insertion sites, i.e. indwelling lines, tubes, and drains  - Monitor endotracheal if appropriate and nasal secretions for changes in amount and color  - [place] appropriate cooling/warming therapies per order  - Administer medications as ordered  - Instruct and encourage patient and family to use good hand hygiene technique  - Identify and instruct in appropriate isolation precautions for identified infection/condition  Outcome: Progressing  Goal: Absence of fever/infection during neutropenic period  Description: INTERVENTIONS:  - Monitor WBC    Outcome: Progressing     Problem: SAFETY ADULT  Goal: Patient will remain free of falls  Description: INTERVENTIONS:  - Educate patient/family on patient safety including physical limitations  - Instruct patient to call for assistance with activity   - Consult OT/PT to assist with strengthening/mobility   - Keep Call bell within reach  - Keep bed low and locked with side rails adjusted as appropriate  - Keep care items and personal belongings within reach  - Initiate and maintain comfort rounds  - Make Fall Risk Sign visible to staff  - Offer Toileting every 2 Hours,  in advance of need  - Initiate/Maintain bed alarm  - Obtain necessary fall risk management equipment: walker   - Apply yellow socks and bracelet for high fall risk patients  - Consider moving patient to room near nurses station  Outcome: Progressing  Goal: Maintain or return to baseline ADL function  Description: INTERVENTIONS:  -  Assess patient's ability to carry out ADLs; assess patient's baseline for ADL function and identify physical deficits which impact ability to perform ADLs (bathing, care of mouth/teeth, toileting, grooming, dressing, etc.)  - Assess/evaluate cause of self-care deficits   - Assess range of motion  - Assess patient's mobility; develop plan if impaired  - Assess patient's need for assistive devices and provide as appropriate  - Encourage maximum independence but intervene and supervise when necessary  - Involve family in performance of ADLs  - Assess for home care needs following discharge   - Consider OT consult to assist with ADL evaluation and planning for discharge  - Provide patient education as appropriate  Outcome: Progressing  Goal: Maintains/Returns to pre admission functional level  Description: INTERVENTIONS:  - Perform AM-PAC 6 Click Basic Mobility/ Daily Activity assessment daily.  - Set and communicate daily mobility goal to care team and patient/family/caregiver.   - Collaborate with rehabilitation services on mobility goals if consulted  - Perform Range of Motion 3 times a day.  - Reposition patient every 2 hours.  - Dangle patient 3 times a day  - Stand patient 3 times a day  - Ambulate patient 3 times a day  - Out of bed to chair 3 times a day   - Out of bed for meals 3 times a day  - Out of bed for toileting  - Record patient progress and toleration of activity level   Outcome: Progressing     Problem: DISCHARGE PLANNING  Goal: Discharge to home or other facility with appropriate resources  Description: INTERVENTIONS:  - Identify barriers to discharge w/patient and  caregiver  - Arrange for needed discharge resources and transportation as appropriate  - Identify discharge learning needs (meds, wound care, etc.)  - Arrange for interpretive services to assist at discharge as needed  - Refer to Case Management Department for coordinating discharge planning if the patient needs post-hospital services based on physician/advanced practitioner order or complex needs related to functional status, cognitive ability, or social support system  Outcome: Progressing     Problem: Knowledge Deficit  Goal: Patient/family/caregiver demonstrates understanding of disease process, treatment plan, medications, and discharge instructions  Description: Complete learning assessment and assess knowledge base.  Interventions:  - Provide teaching at level of understanding  - Provide teaching via preferred learning methods  Outcome: Progressing     Problem: Prexisting or High Potential for Compromised Skin Integrity  Goal: Skin integrity is maintained or improved  Description: INTERVENTIONS:  - Identify patients at risk for skin breakdown  - Assess and monitor skin integrity  - Assess and monitor nutrition and hydration status  - Monitor labs   - Assess for incontinence   - Turn and reposition patient  - Assist with mobility/ambulation  - Relieve pressure over bony prominences  - Avoid friction and shearing  - Provide appropriate hygiene as needed including keeping skin clean and dry  - Evaluate need for skin moisturizer/barrier cream  - Collaborate with interdisciplinary team   - Patient/family teaching  - Consider wound care consult   Outcome: Progressing

## 2024-03-22 LAB
GLUCOSE SERPL-MCNC: 132 MG/DL (ref 65–140)
GLUCOSE SERPL-MCNC: 143 MG/DL (ref 65–140)
GLUCOSE SERPL-MCNC: 174 MG/DL (ref 65–140)
GLUCOSE SERPL-MCNC: 197 MG/DL (ref 65–140)

## 2024-03-22 PROCEDURE — 82948 REAGENT STRIP/BLOOD GLUCOSE: CPT

## 2024-03-22 PROCEDURE — 99231 SBSQ HOSP IP/OBS SF/LOW 25: CPT | Performed by: NURSE PRACTITIONER

## 2024-03-22 RX ADMIN — DIVALPROEX SODIUM 1000 MG: 500 TABLET, DELAYED RELEASE ORAL at 22:38

## 2024-03-22 RX ADMIN — LACOSAMIDE 200 MG: 150 TABLET ORAL at 22:38

## 2024-03-22 RX ADMIN — GABAPENTIN 400 MG: 400 CAPSULE ORAL at 09:35

## 2024-03-22 RX ADMIN — GABAPENTIN 400 MG: 400 CAPSULE ORAL at 20:32

## 2024-03-22 RX ADMIN — DIVALPROEX SODIUM 250 MG: 250 TABLET, FILM COATED, EXTENDED RELEASE ORAL at 09:40

## 2024-03-22 RX ADMIN — INSULIN LISPRO 1 UNITS: 100 INJECTION, SOLUTION INTRAVENOUS; SUBCUTANEOUS at 11:44

## 2024-03-22 RX ADMIN — LORATADINE 10 MG: 10 TABLET ORAL at 09:35

## 2024-03-22 RX ADMIN — INSULIN LISPRO 2 UNITS: 100 INJECTION, SOLUTION INTRAVENOUS; SUBCUTANEOUS at 22:39

## 2024-03-22 RX ADMIN — QUETIAPINE FUMARATE 150 MG: 150 TABLET, EXTENDED RELEASE ORAL at 08:16

## 2024-03-22 RX ADMIN — LISINOPRIL 10 MG: 10 TABLET ORAL at 09:35

## 2024-03-22 RX ADMIN — LACOSAMIDE 150 MG: 150 TABLET ORAL at 09:35

## 2024-03-22 RX ADMIN — LEVOTHYROXINE SODIUM 150 MCG: 75 TABLET ORAL at 09:35

## 2024-03-22 RX ADMIN — ZONISAMIDE 300 MG: 100 CAPSULE ORAL at 22:38

## 2024-03-22 RX ADMIN — QUETIAPINE FUMARATE 300 MG: 150 TABLET, EXTENDED RELEASE ORAL at 22:38

## 2024-03-22 RX ADMIN — DIVALPROEX SODIUM 500 MG: 500 TABLET, DELAYED RELEASE ORAL at 09:40

## 2024-03-22 RX ADMIN — B-COMPLEX W/ C & FOLIC ACID TAB 1 TABLET: TAB at 09:35

## 2024-03-22 RX ADMIN — LEVETIRACETAM 1500 MG: 500 TABLET, FILM COATED ORAL at 09:35

## 2024-03-22 RX ADMIN — LEVETIRACETAM 1500 MG: 500 TABLET, FILM COATED ORAL at 20:32

## 2024-03-22 NOTE — PLAN OF CARE
Problem: PAIN - ADULT  Goal: Verbalizes/displays adequate comfort level or baseline comfort level  Description: Interventions:  - Encourage patient to monitor pain and request assistance  - Assess pain using appropriate pain scale  - Administer analgesics based on type and severity of pain and evaluate response  - Implement non-pharmacological measures as appropriate and evaluate response  - Consider cultural and social influences on pain and pain management  - Notify physician/advanced practitioner if interventions unsuccessful or patient reports new pain  Outcome: Progressing     Problem: INFECTION - ADULT  Goal: Absence or prevention of progression during hospitalization  Description: INTERVENTIONS:  - Assess and monitor for signs and symptoms of infection  - Monitor lab/diagnostic results  - Monitor all insertion sites, i.e. indwelling lines, tubes, and drains  - Monitor endotracheal if appropriate and nasal secretions for changes in amount and color  - [place] appropriate cooling/warming therapies per order  - Administer medications as ordered  - Instruct and encourage patient and family to use good hand hygiene technique  - Identify and instruct in appropriate isolation precautions for identified infection/condition  Outcome: Progressing     Problem: Prexisting or High Potential for Compromised Skin Integrity  Goal: Skin integrity is maintained or improved  Description: INTERVENTIONS:  - Identify patients at risk for skin breakdown  - Assess and monitor skin integrity  - Assess and monitor nutrition and hydration status  - Monitor labs   - Assess for incontinence   - Turn and reposition patient  - Assist with mobility/ambulation  - Relieve pressure over bony prominences  - Avoid friction and shearing  - Provide appropriate hygiene as needed including keeping skin clean and dry  - Evaluate need for skin moisturizer/barrier cream  - Collaborate with interdisciplinary team   - Patient/family teaching  -  Consider wound care consult   Outcome: Progressing

## 2024-03-22 NOTE — CASE MANAGEMENT
Case Management Discharge Planning Note    Patient name Jairon Oconnell  Location /-01 MRN [id number]  : 1965 Date 3/22/2024       Current Admission Date: 3/1/2024  Current Admission Diagnosis:Ambulatory dysfunction   Patient Active Problem List    Diagnosis Date Noted    Urinary retention 2024    Nondisplaced fracture of greater trochanter of right femur, subsequent encounter for closed fracture with routine healing 2024    Closed nondisplaced fracture of proximal phalanx of left index finger with routine healing, subsequent encounter 10/30/2023    Pressure ulcer of sacral region, stage 3 (HCC) 2023    Ambulatory dysfunction 2022    Social problem 2021    Hypomagnesemia 2021    Thrombocytopathia ([facility]) 2020    Hyponatremia 2019    Metabolic encephalopathy 2019    Seborrheic dermatitis of scalp 2019    Nearsightedness 2019    Behavior concern in adult 2019    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HCC) 2019    Vitamin B12 deficiency 2017    Mixed hyperlipidemia 2016    Vitamin D deficiency 2016    Type 2 diabetes mellitus with diabetic neuropathy, without long-term current use of insulin ([facility]) 06/15/2016    Acquired hypothyroidism 06/15/2016    Cataract 2013    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    Generalized convulsive epilepsy ([facility]) 2013    Essential hypertension 2013    Osteoporosis 2013    Scoliosis 2013      LOS (days): 21  Geometric Mean LOS (GMLOS) (days): 3.9  Days to GMLOS:-17.7     OBJECTIVE:  Risk of Unplanned Readmission Score: 26.36         Current admission status: Inpatient   Preferred Pharmacy:   [facility] [place] - [address]  [address] [address]  Phone: [phone] Fax: [phone]    [id number] [place] - [address] [address]  Phone:  [phone] Fax: [phone]    Primary Care Provider: HUNTER Brown    Primary Insurance: MEDICARE  Secondary Insurance[place] [employer]    DISCHARGE DETAILS:    Discharge planning discussed with:: patient and Kemi at the bedside  Freedom of Choice: Yes  Comments - Freedom of Choice: cm spoke with Arvin Rogers  called cm yesterday and made me aware the planned respite home is not appropriate for angelica pt-   pt's former caregiver Keely Houston will have a new home and the pt will be able to be d/ c on 3/30/2024  CM contacted family/caregiver?: Yes             Contacts  Patient Contacts: Kemi  Relationship to Patient:: Family (sister)  Contact Method: In Person  Reason/Outcome: Discharge Planning    Requested Home Health Care         Is the patient interested in HHC at discharge?: No         Other Referral/Resources/Interventions Provided:  Referral Comments: pt will go to a Life sharing home on 3/30/2024- cm will have  a meeting with his care team next week    Would you like to participate in our [facility] service program?  : No - Declined    Treatment Team Recommendation: Home (d/c plan tbd-- tbd)

## 2024-03-22 NOTE — PLAN OF CARE
Problem: PAIN - ADULT  Goal: Verbalizes/displays adequate comfort level or baseline comfort level  Description: Interventions:  - Encourage patient to monitor pain and request assistance  - Assess pain using appropriate pain scale  - Administer analgesics based on type and severity of pain and evaluate response  - Implement non-pharmacological measures as appropriate and evaluate response  - Consider cultural and social influences on pain and pain management  - Notify physician/advanced practitioner if interventions unsuccessful or patient reports new pain  3/21/2024 2036 by Ashley Robbins RN  Outcome: Progressing  3/21/2024 2036 by Ashley Robbins RN  Outcome: Progressing  3/21/2024 2016 by Ashley Robbins RN  Outcome: Progressing     Problem: INFECTION - ADULT  Goal: Absence or prevention of progression during hospitalization  Description: INTERVENTIONS:  - Assess and monitor for signs and symptoms of infection  - Monitor lab/diagnostic results  - Monitor all insertion sites, i.e. indwelling lines, tubes, and drains  - Monitor endotracheal if appropriate and nasal secretions for changes in amount and color  - [place] appropriate cooling/warming therapies per order  - Administer medications as ordered  - Instruct and encourage patient and family to use good hand hygiene technique  - Identify and instruct in appropriate isolation precautions for identified infection/condition  3/21/2024 2036 by Ashley Robbins RN  Outcome: Progressing  3/21/2024 2036 by Ahsley Robbins RN  Outcome: Progressing  3/21/2024 2016 by Ashley Robbins RN  Outcome: Progressing  Goal: Absence of fever/infection during neutropenic period  Description: INTERVENTIONS:  - Monitor WBC    3/21/2024 2036 by Ashley Robbins RN  Outcome: Progressing  3/21/2024 2036 by Ashley Robbins RN  Outcome: Progressing  3/21/2024 2016 by Ashley Robbins RN  Outcome: Progressing     Problem: SAFETY ADULT  Goal: Patient will remain  free of falls  Description: INTERVENTIONS:  - Educate patient/family on patient safety including physical limitations  - Instruct patient to call for assistance with activity   - Consult OT/PT to assist with strengthening/mobility   - Keep Call bell within reach  - Keep bed low and locked with side rails adjusted as appropriate  - Keep care items and personal belongings within reach  - Initiate and maintain comfort rounds  - Make Fall Risk Sign visible to staff  - Offer Toileting every 2 Hours, in advance of need  - Initiate/Maintain bed alarm  - Obtain necessary fall risk management equipment: walker   - Apply yellow socks and bracelet for high fall risk patients  - Consider moving patient to room near nurses station  3/21/2024 2036 by Ashley Robbins RN  Outcome: Progressing  3/21/2024 2016 by Ashley Robbins RN  Outcome: Progressing  Goal: Maintain or return to baseline ADL function  Description: INTERVENTIONS:  -  Assess patient's ability to carry out ADLs; assess patient's baseline for ADL function and identify physical deficits which impact ability to perform ADLs (bathing, care of mouth/teeth, toileting, grooming, dressing, etc.)  - Assess/evaluate cause of self-care deficits   - Assess range of motion  - Assess patient's mobility; develop plan if impaired  - Assess patient's need for assistive devices and provide as appropriate  - Encourage maximum independence but intervene and supervise when necessary  - Involve family in performance of ADLs  - Assess for home care needs following discharge   - Consider OT consult to assist with ADL evaluation and planning for discharge  - Provide patient education as appropriate  3/21/2024 2036 by Ashley Robbins RN  Outcome: Progressing  3/21/2024 2016 by Ashley Robbins RN  Outcome: Progressing  Goal: Maintains/Returns to pre admission functional level  Description: INTERVENTIONS:  - Perform AM-PAC 6 Click Basic Mobility/ Daily Activity assessment daily.  -  Set and communicate daily mobility goal to care team and patient/family/caregiver.   - Collaborate with rehabilitation services on mobility goals if consulted  - Perform Range of Motion 3 times a day.  - Reposition patient every 2 hours.  - Dangle patient 3 times a day  - Stand patient 3 times a day  - Ambulate patient 3 times a day  - Out of bed to chair 3 times a day   - Out of bed for meals 3 times a day  - Out of bed for toileting  - Record patient progress and toleration of activity level   3/21/2024 2036 by Ashley Robbins RN  Outcome: Progressing  3/21/2024 2016 by Ashley Robbins RN  Outcome: Progressing     Problem: DISCHARGE PLANNING  Goal: Discharge to home or other facility with appropriate resources  Description: INTERVENTIONS:  - Identify barriers to discharge w/patient and caregiver  - Arrange for needed discharge resources and transportation as appropriate  - Identify discharge learning needs (meds, wound care, etc.)  - Arrange for interpretive services to assist at discharge as needed  - Refer to Case Management Department for coordinating discharge planning if the patient needs post-hospital services based on physician/advanced practitioner order or complex needs related to functional status, cognitive ability, or social support system  3/21/2024 2036 by Ashley Robbins RN  Outcome: Progressing  3/21/2024 2016 by Ashley Robbins RN  Outcome: Progressing     Problem: Knowledge Deficit  Goal: Patient/family/caregiver demonstrates understanding of disease process, treatment plan, medications, and discharge instructions  Description: Complete learning assessment and assess knowledge base.  Interventions:  - Provide teaching at level of understanding  - Provide teaching via preferred learning methods  3/21/2024 2036 by Ashley Robbins RN  Outcome: Progressing  3/21/2024 2016 by Ashley Robbins RN  Outcome: Progressing     Problem: Prexisting or High Potential for Compromised Skin  Integrity  Goal: Skin integrity is maintained or improved  Description: INTERVENTIONS:  - Identify patients at risk for skin breakdown  - Assess and monitor skin integrity  - Assess and monitor nutrition and hydration status  - Monitor labs   - Assess for incontinence   - Turn and reposition patient  - Assist with mobility/ambulation  - Relieve pressure over bony prominences  - Avoid friction and shearing  - Provide appropriate hygiene as needed including keeping skin clean and dry  - Evaluate need for skin moisturizer/barrier cream  - Collaborate with interdisciplinary team   - Patient/family teaching  - Consider wound care consult   3/21/2024 2036 by Ashley Robbins RN  Outcome: Progressing  3/21/2024 2016 by Ashley Robbins RN  Outcome: Progressing

## 2024-03-22 NOTE — PROGRESS NOTES
[facility]  Progress Note  Name: Jairon Oconnell I  MRN: [id number]  Unit/Bed#: -01 I Date of Admission: 3/1/2024   Date of Service: 3/22/2024 I Hospital Day: 21    Assessment/Plan   * Ambulatory dysfunction  Assessment & Plan  With non-operative right hip greater trochanter fracture seen and evaluated by orthopedic surgery on the previous admission  He was discharged to a skilled nursing facility, became agitated, broke a chair, and was reported to become aggressive with staff.  The facility will now not take him back  Psychiatry recommendations appreciated. No indication for inpatient psychiatric hospitalization at this time.  Recommendation is rehab/SNF  PT/OT recommendations appreciated.  Medically clear for discharge to rehab facility   Case management following, input appreciated                Urinary retention  Assessment & Plan  S/p chapman catheter, since discontinued   Continue tamsulosin  Continue urinary retention protocol             Pressure ulcer of sacral region, stage 3 (HCC)  Assessment & Plan  POA  Wound care recommendations appreciated: Scarring to the left lateral buttock from stage 3 pressure injury in the past. Scarring is intact and blanchable and hyperpigmented   Turn and reposition every 2 hours, frequent offloading              Behavior concern in adult  Assessment & Plan  He has been cooperative without any agitation or behavioral issues    Seroquel XR decreased to 150 BID and 300 HS due to excessive sleepiness             Acquired hypothyroidism  Assessment & Plan  Continue levothyroxine 150 mcg p.o. every morning               Essential hypertension  Assessment & Plan  Continue lisinopril 10 mg daily  Monitor blood pressure                Mixed hyperlipidemia  Assessment & Plan  Continue Pravachol 80 mg oral daily at bedtime               Generalized convulsive epilepsy (HCC)  Assessment & Plan  Continue Depakote  mg daily, Depakote  mg daily,  Depakote DR 1000 mg HS (per  must be brand name) Neurontin 400 mg 3 times daily, Vimpat 150 mg daily and 200 mg HS, zonisamide 300 mg HS, and Keppra 1500 mg every 12 hours.   Continue seizure precautions              Type 2 diabetes mellitus with diabetic neuropathy, without long-term current use of insulin (HCC)  Assessment & Plan  Lab Results   Component Value Date    HGBA1C 5.5 11/27/2023       Recent Labs     03/21/24  1619 03/21/24  2059 03/21/24  2110 03/22/24  0708   POCGLU 163* 144* 128 132       Blood Sugar Average: Last 72 hrs:  (P) 145    Continue diabetic diet        Continue fingerstick blood sugar with sliding scale coverage  Hypoglycemia protocol      Ataxic cerebral palsy (HCC)  Assessment & Plan  Continue supportive care   Continue current medications                        VTE Pharmacologic Prophylaxis: VTE Score: 3 Moderate Risk (Score 3-4) - Pharmacological DVT Prophylaxis Ordered: enoxaparin (Lovenox).    Mobility:   Basic Mobility Inpatient Raw Score: 17  JH-HLM Goal: 5: Stand one or more mins  JH-HLM Achieved: 6: Walk 10 steps or more  HLM Goal NOT achieved. Continue with multidisciplinary rounding and encourage appropriate mobility to improve upon HLM goals.    Patient Centered Rounds: I performed bedside rounds with nursing staff today.   Discussions with Specialists or Other Care Team Provider: CM, OT/PT     Education and Discussions with Family / Patient:  patient was updated.     Total Time Spent on Date of Encounter in care of patient: 20 mins. This time was spent on one or more of the following: performing physical exam; counseling and coordination of care; obtaining or reviewing history; documenting in the medical record; reviewing/ordering tests, medications or procedures; communicating with other healthcare professionals and discussing with patient's family/caregivers.    Current Length of Stay: 21 day(s)  Current Patient Status: Inpatient   Certification  Statement: The patient will continue to require additional inpatient hospital stay due to ambulatory gait dysfunction  Discharge Plan: Anticipate discharge in >72 hrs to rehab facility.CM is in the process of securing a facility for discharge- hopeful discharge in a week.      Code Status: Level 1 - Full Code    Subjective:   The patient was seen and examined.  No acute event overnight. He has been eating well.      Objective:     Vitals:   Temp (24hrs), Av.9 °F (36.1 °C), Min:96.8 °F (36 °C), Max:97 °F (36.1 °C)    Temp:  [96.8 °F (36 °C)-97 °F (36.1 °C)] 96.8 °F (36 °C)  HR:  [80] 80  Resp:  [18] 18  BP: (132-136)/(69-84) 136/69  SpO2:  [98 %] 98 %  Body mass index is 22.42 kg/m².     Input and Output Summary (last 24 hours):     Intake/Output Summary (Last 24 hours) at 3/22/2024 0850  Last data filed at 3/21/2024 1847  Gross per 24 hour   Intake 1640 ml   Output --   Net 1640 ml           Physical Exam:   Physical Exam  Vitals and nursing note reviewed.   Constitutional:       General: He is not in acute distress.     Appearance: Normal appearance.      Comments: Frail and appearing older than age   HENT:      Head: Normocephalic and atraumatic.      Right Ear: External ear normal.      Left Ear: External ear normal.      Nose: Nose normal.      Mouth/Throat:      Mouth: Mucous membranes are moist.      Pharynx: Oropharynx is clear.   Eyes:      General:         Right eye: No discharge.         Left eye: No discharge.      Extraocular Movements: Extraocular movements intact.      Pupils: Pupils are equal, round, and reactive to light.   Cardiovascular:      Rate and Rhythm: Normal rate and regular rhythm.      Pulses: Normal pulses.      Heart sounds: Normal heart sounds. No murmur heard.  Pulmonary:      Effort: Pulmonary effort is normal. No respiratory distress.      Breath sounds: Normal breath sounds. No wheezing or rales.   Abdominal:      General: Bowel sounds are normal. There is no distension.       Palpations: Abdomen is soft. There is no mass.      Tenderness: There is no abdominal tenderness.   Musculoskeletal:         General: No swelling, tenderness or deformity. Normal range of motion.      Cervical back: Normal range of motion and neck supple. No rigidity.   Skin:     General: Skin is warm and dry.      Capillary Refill: Capillary refill takes less than 2 seconds.      Coloration: Skin is not pale.      Findings: No erythema.   Neurological:      Mental Status: He is alert. Mental status is at baseline.      Comments: With periods of forgetfulness/confusion. Upper extremities tremors and ataxia. At baseline.     Psychiatric:         Attention and Perception: Attention normal.         Mood and Affect: Affect is flat.         Behavior: Behavior normal. Behavior is cooperative.         Cognition and Memory: Cognition is impaired.       Additional Data:     Labs:                      Results from last 7 days   Lab Units 03/22/24  0708 03/21/24  2110 03/21/24  2059 03/21/24  1619 03/21/24  1139 03/20/24  2053 03/20/24  1610 03/20/24  1101 03/20/24  0721 03/19/24  2053 03/19/24  1616 03/19/24  1119   POC GLUCOSE mg/dl 132 128 144* 163* 154* 159* 138 96 132 195* 160* 164*               Lines/Drains:  Invasive Devices       None                 Urinary Catheter:  Goal for removal: Voiding trial when ambulation improves               Imaging: Reviewed radiology reports from this admission including: CT head and xray(s)    Recent Cultures (last 7 days):         Last 24 Hours Medication List:   Current Facility-Administered Medications   Medication Dose Route Frequency Provider Last Rate    acetaminophen  650 mg Oral 4x Daily PRN Lizzie Jimi, CRNP      divalproex sodium  250 mg Oral Daily Lizzie Jimi, CRNP      divalproex sodium  1,000 mg Oral HS Lizzie Jimi, CRNP      divalproex sodium  500 mg Oral Daily Lizzie Jimi, CRNP      docusate sodium  100 mg Oral BID PRN Hasmukh Becker PA-C       enoxaparin  40 mg Subcutaneous Daily Hasmukh Becker PA-C      gabapentin  400 mg Oral TID Hasmukh Becker PA-C      ibuprofen  600 mg Oral Q6H PRN Ancelmo Mota PA-C      insulin lispro  1-5 Units Subcutaneous TID AC Hasmukh Becker, JAIR      insulin lispro  1-6 Units Subcutaneous HS Hasmukh Becker PA-C      lacosamide  150 mg Oral Daily Hasmukh Becker PA-C      lacosamide  200 mg Oral HS aHsmukh Becker PA-C      levETIRAcetam  1,500 mg Oral Q12H RONALD Hasmukh Becker PA-C      levothyroxine  150 mcg Oral Daily Hasmukh Becker PA-C      lisinopril  10 mg Oral Daily Hasmukh Becker PA-C      loratadine  10 mg Oral Daily Hasmukh Becker PA-C      multivitamin stress formula  1 tablet Oral Daily Hasmukh Becker PA-C      OLANZapine  5 mg Intramuscular Q3H PRN Hasmukh Becker PA-C      pravastatin  80 mg Oral Daily With Dinner Hasmukh Becker PA-C      QUEtiapine  150 mg Oral BID HUNTER Lopez      QUEtiapine  300 mg Oral HS HUNTER Lopez      tamsulosin  0.4 mg Oral Daily With Dinner HUNTER Lopez      temazepam  15 mg Oral HS PRN Hasmukh Becker PA-C      zonisamide  300 mg Oral HS Hasmukh Becker PA-C          Today, Patient Was Seen By: HUNTER Lopez    **Please Note: This note may have been constructed using a voice recognition system.**

## 2024-03-23 LAB
GLUCOSE SERPL-MCNC: 142 MG/DL (ref 65–140)
GLUCOSE SERPL-MCNC: 144 MG/DL (ref 65–140)
GLUCOSE SERPL-MCNC: 145 MG/DL (ref 65–140)
GLUCOSE SERPL-MCNC: 153 MG/DL (ref 65–140)

## 2024-03-23 PROCEDURE — 99231 SBSQ HOSP IP/OBS SF/LOW 25: CPT | Performed by: NURSE PRACTITIONER

## 2024-03-23 PROCEDURE — 82948 REAGENT STRIP/BLOOD GLUCOSE: CPT

## 2024-03-23 RX ADMIN — QUETIAPINE FUMARATE 150 MG: 150 TABLET, EXTENDED RELEASE ORAL at 08:50

## 2024-03-23 RX ADMIN — PRAVASTATIN SODIUM 80 MG: 40 TABLET ORAL at 17:25

## 2024-03-23 RX ADMIN — LEVETIRACETAM 1500 MG: 500 TABLET, FILM COATED ORAL at 21:05

## 2024-03-23 RX ADMIN — LEVETIRACETAM 1500 MG: 500 TABLET, FILM COATED ORAL at 08:50

## 2024-03-23 RX ADMIN — LEVOTHYROXINE SODIUM 150 MCG: 75 TABLET ORAL at 08:50

## 2024-03-23 RX ADMIN — LORATADINE 10 MG: 10 TABLET ORAL at 08:50

## 2024-03-23 RX ADMIN — LISINOPRIL 10 MG: 10 TABLET ORAL at 08:50

## 2024-03-23 RX ADMIN — TAMSULOSIN HYDROCHLORIDE 0.4 MG: 0.4 CAPSULE ORAL at 17:25

## 2024-03-23 RX ADMIN — DIVALPROEX SODIUM 250 MG: 250 TABLET, FILM COATED, EXTENDED RELEASE ORAL at 08:51

## 2024-03-23 RX ADMIN — GABAPENTIN 400 MG: 400 CAPSULE ORAL at 08:50

## 2024-03-23 RX ADMIN — IBUPROFEN 600 MG: 600 TABLET, FILM COATED ORAL at 21:05

## 2024-03-23 RX ADMIN — GABAPENTIN 400 MG: 400 CAPSULE ORAL at 21:06

## 2024-03-23 RX ADMIN — GABAPENTIN 400 MG: 400 CAPSULE ORAL at 17:25

## 2024-03-23 RX ADMIN — DIVALPROEX SODIUM 1000 MG: 500 TABLET, DELAYED RELEASE ORAL at 21:11

## 2024-03-23 RX ADMIN — QUETIAPINE FUMARATE 150 MG: 150 TABLET, EXTENDED RELEASE ORAL at 14:28

## 2024-03-23 RX ADMIN — QUETIAPINE FUMARATE 300 MG: 150 TABLET, EXTENDED RELEASE ORAL at 21:06

## 2024-03-23 RX ADMIN — LACOSAMIDE 150 MG: 150 TABLET ORAL at 08:50

## 2024-03-23 RX ADMIN — B-COMPLEX W/ C & FOLIC ACID TAB 1 TABLET: TAB at 08:50

## 2024-03-23 RX ADMIN — TEMAZEPAM 15 MG: 7.5 CAPSULE ORAL at 23:27

## 2024-03-23 RX ADMIN — ENOXAPARIN SODIUM 40 MG: 40 INJECTION SUBCUTANEOUS at 08:49

## 2024-03-23 RX ADMIN — LACOSAMIDE 200 MG: 150 TABLET ORAL at 21:05

## 2024-03-23 RX ADMIN — DIVALPROEX SODIUM 500 MG: 500 TABLET, DELAYED RELEASE ORAL at 08:51

## 2024-03-23 RX ADMIN — ZONISAMIDE 300 MG: 100 CAPSULE ORAL at 21:05

## 2024-03-23 NOTE — PLAN OF CARE
Problem: PAIN - ADULT  Goal: Verbalizes/displays adequate comfort level or baseline comfort level  Description: Interventions:  - Encourage patient to monitor pain and request assistance  - Assess pain using appropriate pain scale  - Administer analgesics based on type and severity of pain and evaluate response  - Implement non-pharmacological measures as appropriate and evaluate response  - Consider cultural and social influences on pain and pain management  - Notify physician/advanced practitioner if interventions unsuccessful or patient reports new pain  Outcome: Progressing     Problem: INFECTION - ADULT  Goal: Absence or prevention of progression during hospitalization  Description: INTERVENTIONS:  - Assess and monitor for signs and symptoms of infection  - Monitor lab/diagnostic results  - Monitor all insertion sites, i.e. indwelling lines, tubes, and drains  - Monitor endotracheal if appropriate and nasal secretions for changes in amount and color  - [place] appropriate cooling/warming therapies per order  - Administer medications as ordered  - Instruct and encourage patient and family to use good hand hygiene technique  - Identify and instruct in appropriate isolation precautions for identified infection/condition  Outcome: Progressing  Goal: Absence of fever/infection during neutropenic period  Description: INTERVENTIONS:  - Monitor WBC    Outcome: Progressing     Problem: SAFETY ADULT  Goal: Patient will remain free of falls  Description: INTERVENTIONS:  - Educate patient/family on patient safety including physical limitations  - Instruct patient to call for assistance with activity   - Consult OT/PT to assist with strengthening/mobility   - Keep Call bell within reach  - Keep bed low and locked with side rails adjusted as appropriate  - Keep care items and personal belongings within reach  - Initiate and maintain comfort rounds  - Make Fall Risk Sign visible to staff  - Offer Toileting every 2 Hours,  in advance of need  - Initiate/Maintain bed alarm  - Obtain necessary fall risk management equipment: nonslip socks   - Apply yellow socks and bracelet for high fall risk patients  - Consider moving patient to room near nurses station  Outcome: Progressing  Goal: Maintain or return to baseline ADL function  Description: INTERVENTIONS:  -  Assess patient's ability to carry out ADLs; assess patient's baseline for ADL function and identify physical deficits which impact ability to perform ADLs (bathing, care of mouth/teeth, toileting, grooming, dressing, etc.)  - Assess/evaluate cause of self-care deficits   - Assess range of motion  - Assess patient's mobility; develop plan if impaired  - Assess patient's need for assistive devices and provide as appropriate  - Encourage maximum independence but intervene and supervise when necessary  - Involve family in performance of ADLs  - Assess for home care needs following discharge   - Consider OT consult to assist with ADL evaluation and planning for discharge  - Provide patient education as appropriate  Outcome: Progressing  Goal: Maintains/Returns to pre admission functional level  Description: INTERVENTIONS:  - Perform AM-PAC 6 Click Basic Mobility/ Daily Activity assessment daily.  - Set and communicate daily mobility goal to care team and patient/family/caregiver.   - Collaborate with rehabilitation services on mobility goals if consulted  - Perform Range of Motion 3 times a day.  - Reposition patient every 2 hours.  - Dangle patient 3 times a day  - Stand patient 3 times a day  - Ambulate patient 3 times a day  - Out of bed to chair 3 times a day   - Out of bed for meals 3 times a day  - Out of bed for toileting  - Record patient progress and toleration of activity level   Outcome: Progressing     Problem: DISCHARGE PLANNING  Goal: Discharge to home or other facility with appropriate resources  Description: INTERVENTIONS:  - Identify barriers to discharge w/patient and  caregiver  - Arrange for needed discharge resources and transportation as appropriate  - Identify discharge learning needs (meds, wound care, etc.)  - Arrange for interpretive services to assist at discharge as needed  - Refer to Case Management Department for coordinating discharge planning if the patient needs post-hospital services based on physician/advanced practitioner order or complex needs related to functional status, cognitive ability, or social support system  Outcome: Progressing     Problem: Knowledge Deficit  Goal: Patient/family/caregiver demonstrates understanding of disease process, treatment plan, medications, and discharge instructions  Description: Complete learning assessment and assess knowledge base.  Interventions:  - Provide teaching at level of understanding  - Provide teaching via preferred learning methods  Outcome: Progressing     Problem: Prexisting or High Potential for Compromised Skin Integrity  Goal: Skin integrity is maintained or improved  Description: INTERVENTIONS:  - Identify patients at risk for skin breakdown  - Assess and monitor skin integrity  - Assess and monitor nutrition and hydration status  - Monitor labs   - Assess for incontinence   - Turn and reposition patient  - Assist with mobility/ambulation  - Relieve pressure over bony prominences  - Avoid friction and shearing  - Provide appropriate hygiene as needed including keeping skin clean and dry  - Evaluate need for skin moisturizer/barrier cream  - Collaborate with interdisciplinary team   - Patient/family teaching  - Consider wound care consult   Outcome: Progressing

## 2024-03-23 NOTE — PROGRESS NOTES
[facility]  Progress Note  Name: Jairon Oconnell I  MRN: [id number]  Unit/Bed#: -01 I Date of Admission: 3/1/2024   Date of Service: 3/23/2024 I Hospital Day: 22    Assessment/Plan   * Ambulatory dysfunction  Assessment & Plan  With non-operative right hip greater trochanter fracture seen and evaluated by orthopedic surgery on the previous admission  He was discharged to a skilled nursing facility, became agitated, broke a chair, and was reported to become aggressive with staff.  The facility will now not take him back  Psychiatry recommendations appreciated. No indication for inpatient psychiatric hospitalization at this time.  Recommendation is rehab/SNF  PT/OT recommendations appreciated.  Medically clear for discharge to rehab facility   Case management following, input appreciated                 Urinary retention  Assessment & Plan  S/p chapman catheter, since discontinued   Continue tamsulosin  Continue urinary retention protocol              Pressure ulcer of sacral region, stage 3 (HCC)  Assessment & Plan  POA  Wound care recommendations appreciated: Scarring to the left lateral buttock from stage 3 pressure injury in the past. Scarring is intact and blanchable and hyperpigmented   Turn and reposition every 2 hours, frequent offloading               Behavior concern in adult  Assessment & Plan  He has been cooperative without any agitation or behavioral issues    Seroquel XR decreased to 150 BID and 300 HS due to excessive sleepiness              Acquired hypothyroidism  Assessment & Plan  Continue levothyroxine 150 mcg p.o. every morning                Essential hypertension  Assessment & Plan  Continue lisinopril 10 mg daily  Monitor blood pressure                 Mixed hyperlipidemia  Assessment & Plan  Continue Pravachol 80 mg oral daily at bedtime                Generalized convulsive epilepsy (HCC)  Assessment & Plan  Continue Depakote  mg daily, Depakote  mg  daily, Depakote DR 1000 mg HS (per  must be brand name) Neurontin 400 mg 3 times daily, Vimpat 150 mg daily and 200 mg HS, zonisamide 300 mg HS, and Keppra 1500 mg every 12 hours.   Continue seizure precautions               Type 2 diabetes mellitus with diabetic neuropathy, without long-term current use of insulin (HCC)  Assessment & Plan  Lab Results   Component Value Date    HGBA1C 5.5 11/27/2023       Recent Labs     03/22/24  1113 03/22/24  1553 03/22/24  2105 03/23/24  0725   POCGLU 174* 143* 197* 145*       Blood Sugar Average: Last 72 hrs:  (P) [id number]    Continue diabetic diet        Continue fingerstick blood sugar with sliding scale coverage  Hypoglycemia protocol       Ataxic cerebral palsy (HCC)  Assessment & Plan  Continue supportive care   Continue current medications                         VTE Pharmacologic Prophylaxis: VTE Score: 3 Moderate Risk (Score 3-4) - Pharmacological DVT Prophylaxis Ordered: enoxaparin (Lovenox).    Mobility:   Basic Mobility Inpatient Raw Score: 18  JH-HLM Goal: 6: Walk 10 steps or more  JH-HLM Achieved: 6: Walk 10 steps or more  HLM Goal NOT achieved. Continue with multidisciplinary rounding and encourage appropriate mobility to improve upon HLM goals.    Patient Centered Rounds: I performed bedside rounds with nursing staff today.   Discussions with Specialists or Other Care Team Provider: CM     Education and Discussions with Family / Patient:  patient was updated.     Total Time Spent on Date of Encounter in care of patient: 21 mins. This time was spent on one or more of the following: performing physical exam; counseling and coordination of care; obtaining or reviewing history; documenting in the medical record; reviewing/ordering tests, medications or procedures; communicating with other healthcare professionals and discussing with patient's family/caregivers.    Current Length of Stay: 22 day(s)  Current Patient Status: Inpatient    Certification Statement: The patient will continue to require additional inpatient hospital stay due to ambulatory gait dysfunction  Discharge Plan: Anticipate discharge in >72 hrs to rehab facility.CM is in the process of securing a facility for discharge- hopeful discharge in a week.      Code Status: Level 1 - Full Code    Subjective:   The patient was seen and examined.  No acute event overnight. He denies any pain.     Objective:     Vitals:   Temp (24hrs), Av.8 °F (36 °C), Min:96.8 °F (36 °C), Max:96.8 °F (36 °C)    Temp:  [96.8 °F (36 °C)] 96.8 °F (36 °C)  Resp:  [14-18] 14  BP: (130-163)/(72-93) 130/72  Body mass index is 22.54 kg/m².     Input and Output Summary (last 24 hours):     Intake/Output Summary (Last 24 hours) at 3/23/2024 1036  Last data filed at 3/23/2024 0824  Gross per 24 hour   Intake 600 ml   Output --   Net 600 ml           Physical Exam:   Physical Exam  Vitals and nursing note reviewed.   Constitutional:       General: He is not in acute distress.     Appearance: Normal appearance.      Comments: Frail and appearing older than age   HENT:      Head: Normocephalic and atraumatic.      Right Ear: External ear normal.      Left Ear: External ear normal.      Nose: Nose normal.      Mouth/Throat:      Mouth: Mucous membranes are moist.      Pharynx: Oropharynx is clear.   Eyes:      General:         Right eye: No discharge.         Left eye: No discharge.      Extraocular Movements: Extraocular movements intact.      Pupils: Pupils are equal, round, and reactive to light.   Cardiovascular:      Rate and Rhythm: Normal rate and regular rhythm.      Pulses: Normal pulses.      Heart sounds: Normal heart sounds. No murmur heard.  Pulmonary:      Effort: Pulmonary effort is normal. No respiratory distress.      Breath sounds: Normal breath sounds. No wheezing or rales.   Abdominal:      General: Bowel sounds are normal. There is no distension.      Palpations: Abdomen is soft. There is no  mass.      Tenderness: There is no abdominal tenderness.   Musculoskeletal:         General: No swelling, tenderness or deformity. Normal range of motion.      Cervical back: Normal range of motion and neck supple. No rigidity.   Skin:     General: Skin is warm and dry.      Capillary Refill: Capillary refill takes less than 2 seconds.      Coloration: Skin is not pale.      Findings: No erythema.   Neurological:      Mental Status: He is alert. Mental status is at baseline.      Comments: With periods of forgetfulness/confusion. Upper extremities tremors and ataxia. At baseline.     Psychiatric:         Attention and Perception: Attention normal.         Mood and Affect: Affect is flat.         Behavior: Behavior normal. Behavior is cooperative.         Cognition and Memory: Cognition is impaired.       Additional Data:     Labs:                      Results from last 7 days   Lab Units 03/23/24  0725 03/22/24  2105 03/22/24  1553 03/22/24  1113 03/22/24  0708 03/21/24  2110 03/21/24  2059 03/21/24  1619 03/21/24  1139 03/20/24  2053 03/20/24  1610 03/20/24  1101   POC GLUCOSE mg/dl 145* 197* 143* 174* 132 128 144* 163* 154* 159* 138 96               Lines/Drains:  Invasive Devices       None                 Urinary Catheter:  Goal for removal: Voiding trial when ambulation improves               Imaging: Reviewed radiology reports from this admission including: CT head and xray(s)    Recent Cultures (last 7 days):         Last 24 Hours Medication List:   Current Facility-Administered Medications   Medication Dose Route Frequency Provider Last Rate    acetaminophen  650 mg Oral 4x Daily PRN Lizzie Jimi, CRNP      divalproex sodium  250 mg Oral Daily Lizzie Jimi, CRNP      divalproex sodium  1,000 mg Oral HS Lizzie Jimi, CRNP      divalproex sodium  500 mg Oral Daily Lizzie Jimi, CRNP      docusate sodium  100 mg Oral BID PRN Hasmukh Becker PA-C      enoxaparin  40 mg Subcutaneous Daily Hasmukh  JAIR Becker      gabapentin  400 mg Oral TID Hasmukh Becker PA-C      ibuprofen  600 mg Oral Q6H PRN Ancelmo Mota PA-C      insulin lispro  1-5 Units Subcutaneous TID AC Hasmukh Becker, JAIR      insulin lispro  1-6 Units Subcutaneous HS Hasmukh Becker PA-C      lacosamide  150 mg Oral Daily Hasmukh Becker PA-C      lacosamide  200 mg Oral HS Hasmukh Becker PA-C      levETIRAcetam  1,500 mg Oral Q12H RONALD Hasmukh Becker PA-C      levothyroxine  150 mcg Oral Daily Hasmukh Becker PA-C      lisinopril  10 mg Oral Daily Hasmukh Becker PA-C      loratadine  10 mg Oral Daily Hasmukh Becker PA-C      multivitamin stress formula  1 tablet Oral Daily Hasmukh Becker PA-C      OLANZapine  5 mg Intramuscular Q3H PRN Hasmukh Becker PA-C      pravastatin  80 mg Oral Daily With Dinner Hasmukh Becker PA-C      QUEtiapine  150 mg Oral BID HUNTER Lopez      QUEtiapine  300 mg Oral HS HUNTER Lopez      tamsulosin  0.4 mg Oral Daily With Dinner HUNTER Lopez      temazepam  15 mg Oral HS PRN Hasmukh Becker PA-C      zonisamide  300 mg Oral HS Hasmukh Becker PA-C          Today, Patient Was Seen By: HUNTER Lopez    **Please Note: This note may have been constructed using a voice recognition system.**

## 2024-03-24 LAB
GLUCOSE SERPL-MCNC: 122 MG/DL (ref 65–140)
GLUCOSE SERPL-MCNC: 138 MG/DL (ref 65–140)

## 2024-03-24 PROCEDURE — 99231 SBSQ HOSP IP/OBS SF/LOW 25: CPT | Performed by: NURSE PRACTITIONER

## 2024-03-24 PROCEDURE — 82948 REAGENT STRIP/BLOOD GLUCOSE: CPT

## 2024-03-24 RX ADMIN — QUETIAPINE FUMARATE 150 MG: 150 TABLET, EXTENDED RELEASE ORAL at 08:47

## 2024-03-24 RX ADMIN — ENOXAPARIN SODIUM 40 MG: 40 INJECTION SUBCUTANEOUS at 08:48

## 2024-03-24 RX ADMIN — LACOSAMIDE 150 MG: 150 TABLET ORAL at 08:47

## 2024-03-24 RX ADMIN — LISINOPRIL 10 MG: 10 TABLET ORAL at 08:47

## 2024-03-24 RX ADMIN — DIVALPROEX SODIUM 250 MG: 250 TABLET, FILM COATED, EXTENDED RELEASE ORAL at 08:46

## 2024-03-24 RX ADMIN — LACOSAMIDE 200 MG: 150 TABLET ORAL at 21:03

## 2024-03-24 RX ADMIN — GABAPENTIN 400 MG: 400 CAPSULE ORAL at 08:48

## 2024-03-24 RX ADMIN — LORATADINE 10 MG: 10 TABLET ORAL at 08:48

## 2024-03-24 RX ADMIN — GABAPENTIN 400 MG: 400 CAPSULE ORAL at 17:13

## 2024-03-24 RX ADMIN — LEVETIRACETAM 1500 MG: 500 TABLET, FILM COATED ORAL at 20:01

## 2024-03-24 RX ADMIN — PRAVASTATIN SODIUM 80 MG: 40 TABLET ORAL at 17:13

## 2024-03-24 RX ADMIN — DIVALPROEX SODIUM 500 MG: 500 TABLET, DELAYED RELEASE ORAL at 08:46

## 2024-03-24 RX ADMIN — DIVALPROEX SODIUM 1000 MG: 500 TABLET, DELAYED RELEASE ORAL at 21:03

## 2024-03-24 RX ADMIN — LEVOTHYROXINE SODIUM 150 MCG: 75 TABLET ORAL at 08:47

## 2024-03-24 RX ADMIN — QUETIAPINE FUMARATE 300 MG: 150 TABLET, EXTENDED RELEASE ORAL at 21:02

## 2024-03-24 RX ADMIN — LEVETIRACETAM 1500 MG: 500 TABLET, FILM COATED ORAL at 08:47

## 2024-03-24 RX ADMIN — TAMSULOSIN HYDROCHLORIDE 0.4 MG: 0.4 CAPSULE ORAL at 17:13

## 2024-03-24 RX ADMIN — B-COMPLEX W/ C & FOLIC ACID TAB 1 TABLET: TAB at 08:47

## 2024-03-24 RX ADMIN — TEMAZEPAM 15 MG: 7.5 CAPSULE ORAL at 21:02

## 2024-03-24 RX ADMIN — QUETIAPINE FUMARATE 150 MG: 150 TABLET, EXTENDED RELEASE ORAL at 14:37

## 2024-03-24 RX ADMIN — ZONISAMIDE 300 MG: 100 CAPSULE ORAL at 21:02

## 2024-03-24 RX ADMIN — GABAPENTIN 400 MG: 400 CAPSULE ORAL at 20:01

## 2024-03-24 NOTE — PROGRESS NOTES
[facility]  Progress Note  Name: Jairon Oconnell I  MRN: [id number]  Unit/Bed#: -01 I Date of Admission: 3/1/2024   Date of Service: 3/24/2024 I Hospital Day: 23    Assessment/Plan   * Ambulatory dysfunction  Assessment & Plan  With non-operative right hip greater trochanter fracture seen and evaluated by orthopedic surgery on the previous admission  He was discharged to a skilled nursing facility, became agitated, broke a chair, and was reported to become aggressive with staff.  The facility will now not take him back  Psychiatry recommendations appreciated. No indication for inpatient psychiatric hospitalization at this time.  Recommendation is rehab/SNF  PT/OT recommendations appreciated.  Medically clear for discharge to rehab facility   Case management following, input appreciated                  Urinary retention  Assessment & Plan  S/p chapman catheter, since discontinued   Continue tamsulosin  Continue urinary retention protocol               Pressure ulcer of sacral region, stage 3 (HCC)  Assessment & Plan  POA  Wound care recommendations appreciated: Scarring to the left lateral buttock from stage 3 pressure injury in the past. Scarring is intact and blanchable and hyperpigmented   Turn and reposition every 2 hours, frequent offloading                Behavior concern in adult  Assessment & Plan  He has been cooperative without any agitation or behavioral issues    Seroquel XR decreased to 150 BID and 300 HS due to excessive sleepiness               Acquired hypothyroidism  Assessment & Plan  Continue levothyroxine 150 mcg p.o. every morning                 Essential hypertension  Assessment & Plan  Continue lisinopril 10 mg daily  Monitor blood pressure                  Mixed hyperlipidemia  Assessment & Plan  Continue Pravachol 80 mg oral daily at bedtime                Generalized convulsive epilepsy (HCC)  Assessment & Plan  Continue Depakote  mg daily, Depakote   mg daily, Depakote DR 1000 mg HS (per  must be brand name) Neurontin 400 mg 3 times daily, Vimpat 150 mg daily and 200 mg HS, zonisamide 300 mg HS, and Keppra 1500 mg every 12 hours.   Continue seizure precautions                Type 2 diabetes mellitus with diabetic neuropathy, without long-term current use of insulin (HCC)  Assessment & Plan  Lab Results   Component Value Date    HGBA1C 5.5 11/27/2023       Recent Labs     03/23/24  1045 03/23/24  1609 03/23/24  2055 03/24/24  0748   POCGLU 144* 142* 153* 138       Blood Sugar Average: Last 72 hrs:  (P) [id number]    Continue diabetic diet        Continue fingerstick blood sugar with sliding scale coverage  Hypoglycemia protocol        Ataxic cerebral palsy (HCC)  Assessment & Plan  Continue supportive care   Continue current medications                          VTE Pharmacologic Prophylaxis: VTE Score: 3 Moderate Risk (Score 3-4) - Pharmacological DVT Prophylaxis Ordered: enoxaparin (Lovenox).    Mobility:   Basic Mobility Inpatient Raw Score: 18  JH-HLM Goal: 6: Walk 10 steps or more  JH-HLM Achieved: 7: Walk 25 feet or more  HLM Goal NOT achieved. Continue with multidisciplinary rounding and encourage appropriate mobility to improve upon HLM goals.    Patient Centered Rounds: I performed bedside rounds with nursing staff today.   Discussions with Specialists or Other Care Team Provider: CM     Education and Discussions with Family / Patient:  patient was updated.     Total Time Spent on Date of Encounter in care of patient: 20 mins. This time was spent on one or more of the following: performing physical exam; counseling and coordination of care; obtaining or reviewing history; documenting in the medical record; reviewing/ordering tests, medications or procedures; communicating with other healthcare professionals and discussing with patient's family/caregivers.    Current Length of Stay: 23 day(s)  Current Patient Status: Inpatient    Certification Statement: The patient will continue to require additional inpatient hospital stay due to ambulatory gait dysfunction  Discharge Plan: Anticipate discharge in >72 hrs to rehab facility.CM is in the process of securing a facility for discharge- hopeful discharge in a week.      Code Status: Level 1 - Full Code    Subjective:   The patient was seen and examined.  No acute event overnight. He is sitting in bed eating breakfast.     Objective:     Vitals:   No data recorded.    BP: (150)/(90) 150/90  Body mass index is 22.84 kg/m².     Input and Output Summary (last 24 hours):     Intake/Output Summary (Last 24 hours) at 3/24/2024 1106  Last data filed at 3/23/2024 1845  Gross per 24 hour   Intake 1680 ml   Output --   Net 1680 ml           Physical Exam:   Physical Exam  Vitals and nursing note reviewed.   Constitutional:       General: He is not in acute distress.     Appearance: Normal appearance.      Comments: Frail and appearing older than age   HENT:      Head: Normocephalic and atraumatic.      Right Ear: External ear normal.      Left Ear: External ear normal.      Nose: Nose normal.      Mouth/Throat:      Mouth: Mucous membranes are moist.      Pharynx: Oropharynx is clear.   Eyes:      General:         Right eye: No discharge.         Left eye: No discharge.      Extraocular Movements: Extraocular movements intact.      Pupils: Pupils are equal, round, and reactive to light.   Cardiovascular:      Rate and Rhythm: Normal rate and regular rhythm.      Pulses: Normal pulses.      Heart sounds: Normal heart sounds. No murmur heard.  Pulmonary:      Effort: Pulmonary effort is normal. No respiratory distress.      Breath sounds: Normal breath sounds. No wheezing or rales.   Abdominal:      General: Bowel sounds are normal. There is no distension.      Palpations: Abdomen is soft. There is no mass.      Tenderness: There is no abdominal tenderness.   Musculoskeletal:         General: No swelling,  tenderness or deformity. Normal range of motion.      Cervical back: Normal range of motion and neck supple. No rigidity.   Skin:     General: Skin is warm and dry.      Capillary Refill: Capillary refill takes less than 2 seconds.      Coloration: Skin is not pale.      Findings: No erythema.   Neurological:      Mental Status: He is alert. Mental status is at baseline.      Comments: With periods of forgetfulness/confusion. Upper extremities tremors and ataxia. At baseline.     Psychiatric:         Attention and Perception: Attention normal.         Mood and Affect: Affect is flat.         Behavior: Behavior normal. Behavior is cooperative.         Cognition and Memory: Cognition is impaired.       Additional Data:     Labs:                      Results from last 7 days   Lab Units 03/24/24  0748 03/23/24  2055 03/23/24  1609 03/23/24  1045 03/23/24  0725 03/22/24  2105 03/22/24  1553 03/22/24  1113 03/22/24  0708 03/21/24  2110 03/21/24  2059 03/21/24  1619   POC GLUCOSE mg/dl 138 153* 142* 144* 145* 197* 143* 174* 132 128 144* 163*               Lines/Drains:  Invasive Devices       None                 Urinary Catheter:  Goal for removal: Voiding trial when ambulation improves               Imaging: Reviewed radiology reports from this admission including: CT head and xray(s)    Recent Cultures (last 7 days):         Last 24 Hours Medication List:   Current Facility-Administered Medications   Medication Dose Route Frequency Provider Last Rate    acetaminophen  650 mg Oral 4x Daily PRN Lizzie Jimi, CRNP      divalproex sodium  250 mg Oral Daily Lizzie Jimi, CRNP      divalproex sodium  1,000 mg Oral HS Lizzie Jimi, CRNP      divalproex sodium  500 mg Oral Daily Lizzie Jimi, CRNP      docusate sodium  100 mg Oral BID PRN Hasmukh Becker PA-C      enoxaparin  40 mg Subcutaneous Daily aHsmukh Becker PA-C      gabapentin  400 mg Oral TID Hasmukh Becker PA-C      ibuprofen  600 mg Oral Q6H PRN Ancelmo  Chito Mota PA-C      insulin lispro  1-5 Units Subcutaneous TID AC Hasmukh Becker, JAIR      insulin lispro  1-6 Units Subcutaneous HS Hasmukh Becker PA-C      lacosamide  150 mg Oral Daily Hasmukh Becker PA-C      lacosamide  200 mg Oral HS Hasmukh Becker PA-C      levETIRAcetam  1,500 mg Oral Q12H RONALD Hasmukh Becker PA-C      levothyroxine  150 mcg Oral Daily Hasmukh Becker PA-C      lisinopril  10 mg Oral Daily Hasmukh Becker PA-C      loratadine  10 mg Oral Daily Hasmukh Becker PA-C      multivitamin stress formula  1 tablet Oral Daily Hasmukh Becker PA-C      OLANZapine  5 mg Intramuscular Q3H PRN Hasmukh Becker PA-C      pravastatin  80 mg Oral Daily With Dinner Hasmukh Becker PA-C      QUEtiapine  150 mg Oral BID HUNTER Lopez      QUEtiapine  300 mg Oral HS HUNTER Lopez      tamsulosin  0.4 mg Oral Daily With Dinner HUNTER Lopez      temazepam  15 mg Oral HS PRN Hasmukh Becker PA-C      zonisamide  300 mg Oral HS Hasmukh Becker PA-C          Today, Patient Was Seen By: HUNTER Lopez    **Please Note: This note may have been constructed using a voice recognition system.**

## 2024-03-24 NOTE — PLAN OF CARE
Problem: PAIN - ADULT  Goal: Verbalizes/displays adequate comfort level or baseline comfort level  Description: Interventions:  - Encourage patient to monitor pain and request assistance  - Assess pain using appropriate pain scale  - Administer analgesics based on type and severity of pain and evaluate response  - Implement non-pharmacological measures as appropriate and evaluate response  - Consider cultural and social influences on pain and pain management  - Notify physician/advanced practitioner if interventions unsuccessful or patient reports new pain  Outcome: Progressing     Problem: INFECTION - ADULT  Goal: Absence or prevention of progression during hospitalization  Description: INTERVENTIONS:  - Assess and monitor for signs and symptoms of infection  - Monitor lab/diagnostic results  - Monitor all insertion sites, i.e. indwelling lines, tubes, and drains  - Monitor endotracheal if appropriate and nasal secretions for changes in amount and color  - [place] appropriate cooling/warming therapies per order  - Administer medications as ordered  - Instruct and encourage patient and family to use good hand hygiene technique  - Identify and instruct in appropriate isolation precautions for identified infection/condition  Outcome: Progressing  Goal: Absence of fever/infection during neutropenic period  Description: INTERVENTIONS:  - Monitor WBC    Outcome: Progressing     Problem: SAFETY ADULT  Goal: Patient will remain free of falls  Description: INTERVENTIONS:  - Educate patient/family on patient safety including physical limitations  - Instruct patient to call for assistance with activity   - Consult OT/PT to assist with strengthening/mobility   - Keep Call bell within reach  - Keep bed low and locked with side rails adjusted as appropriate  - Keep care items and personal belongings within reach  - Initiate and maintain comfort rounds  - Make Fall Risk Sign visible to staff  - Offer Toileting every 2 Hours,  in advance of need  - Initiate/Maintain bed alarm  - Obtain necessary fall risk management equipment: nonslip socks  - Apply yellow socks and bracelet for high fall risk patients  - Consider moving patient to room near nurses station  Outcome: Progressing  Goal: Maintain or return to baseline ADL function  Description: INTERVENTIONS:  -  Assess patient's ability to carry out ADLs; assess patient's baseline for ADL function and identify physical deficits which impact ability to perform ADLs (bathing, care of mouth/teeth, toileting, grooming, dressing, etc.)  - Assess/evaluate cause of self-care deficits   - Assess range of motion  - Assess patient's mobility; develop plan if impaired  - Assess patient's need for assistive devices and provide as appropriate  - Encourage maximum independence but intervene and supervise when necessary  - Involve family in performance of ADLs  - Assess for home care needs following discharge   - Consider OT consult to assist with ADL evaluation and planning for discharge  - Provide patient education as appropriate  Outcome: Progressing  Goal: Maintains/Returns to pre admission functional level  Description: INTERVENTIONS:  - Perform AM-PAC 6 Click Basic Mobility/ Daily Activity assessment daily.  - Set and communicate daily mobility goal to care team and patient/family/caregiver.   - Collaborate with rehabilitation services on mobility goals if consulted  - Perform Range of Motion 3 times a day.  - Reposition patient every 2 hours.  - Dangle patient 3 times a day  - Stand patient 3 times a day  - Ambulate patient 3 times a day  - Out of bed to chair 3 times a day   - Out of bed for meals 3 times a day  - Out of bed for toileting  - Record patient progress and toleration of activity level   Outcome: Progressing     Problem: DISCHARGE PLANNING  Goal: Discharge to home or other facility with appropriate resources  Description: INTERVENTIONS:  - Identify barriers to discharge w/patient and  caregiver  - Arrange for needed discharge resources and transportation as appropriate  - Identify discharge learning needs (meds, wound care, etc.)  - Arrange for interpretive services to assist at discharge as needed  - Refer to Case Management Department for coordinating discharge planning if the patient needs post-hospital services based on physician/advanced practitioner order or complex needs related to functional status, cognitive ability, or social support system  Outcome: Progressing     Problem: Knowledge Deficit  Goal: Patient/family/caregiver demonstrates understanding of disease process, treatment plan, medications, and discharge instructions  Description: Complete learning assessment and assess knowledge base.  Interventions:  - Provide teaching at level of understanding  - Provide teaching via preferred learning methods  Outcome: Progressing     Problem: Prexisting or High Potential for Compromised Skin Integrity  Goal: Skin integrity is maintained or improved  Description: INTERVENTIONS:  - Identify patients at risk for skin breakdown  - Assess and monitor skin integrity  - Assess and monitor nutrition and hydration status  - Monitor labs   - Assess for incontinence   - Turn and reposition patient  - Assist with mobility/ambulation  - Relieve pressure over bony prominences  - Avoid friction and shearing  - Provide appropriate hygiene as needed including keeping skin clean and dry  - Evaluate need for skin moisturizer/barrier cream  - Collaborate with interdisciplinary team   - Patient/family teaching  - Consider wound care consult   Outcome: Progressing

## 2024-03-24 NOTE — PROGRESS NOTES
Pt refusing vs and lunch/ dinner blood sugars.  Throwing items from bedside table into hallway.  Pt not cursing as much as usually seen.

## 2024-03-25 LAB
GLUCOSE SERPL-MCNC: 142 MG/DL (ref 65–140)
GLUCOSE SERPL-MCNC: 142 MG/DL (ref 65–140)
GLUCOSE SERPL-MCNC: 145 MG/DL (ref 65–140)
GLUCOSE SERPL-MCNC: 177 MG/DL (ref 65–140)

## 2024-03-25 PROCEDURE — 97530 THERAPEUTIC ACTIVITIES: CPT

## 2024-03-25 PROCEDURE — 99233 SBSQ HOSP IP/OBS HIGH 50: CPT | Performed by: NURSE PRACTITIONER

## 2024-03-25 PROCEDURE — 97116 GAIT TRAINING THERAPY: CPT

## 2024-03-25 PROCEDURE — 82948 REAGENT STRIP/BLOOD GLUCOSE: CPT

## 2024-03-25 RX ADMIN — TAMSULOSIN HYDROCHLORIDE 0.4 MG: 0.4 CAPSULE ORAL at 17:12

## 2024-03-25 RX ADMIN — DIVALPROEX SODIUM 500 MG: 500 TABLET, DELAYED RELEASE ORAL at 10:20

## 2024-03-25 RX ADMIN — ENOXAPARIN SODIUM 40 MG: 40 INJECTION SUBCUTANEOUS at 10:16

## 2024-03-25 RX ADMIN — QUETIAPINE FUMARATE 150 MG: 150 TABLET, EXTENDED RELEASE ORAL at 10:20

## 2024-03-25 RX ADMIN — B-COMPLEX W/ C & FOLIC ACID TAB 1 TABLET: TAB at 10:16

## 2024-03-25 RX ADMIN — TEMAZEPAM 15 MG: 7.5 CAPSULE ORAL at 21:28

## 2024-03-25 RX ADMIN — QUETIAPINE FUMARATE 300 MG: 150 TABLET, EXTENDED RELEASE ORAL at 21:28

## 2024-03-25 RX ADMIN — LORATADINE 10 MG: 10 TABLET ORAL at 10:16

## 2024-03-25 RX ADMIN — LEVOTHYROXINE SODIUM 150 MCG: 75 TABLET ORAL at 10:50

## 2024-03-25 RX ADMIN — ZONISAMIDE 300 MG: 100 CAPSULE ORAL at 21:29

## 2024-03-25 RX ADMIN — LACOSAMIDE 200 MG: 150 TABLET ORAL at 21:28

## 2024-03-25 RX ADMIN — LACOSAMIDE 150 MG: 150 TABLET ORAL at 10:16

## 2024-03-25 RX ADMIN — LEVETIRACETAM 1500 MG: 500 TABLET, FILM COATED ORAL at 10:16

## 2024-03-25 RX ADMIN — IBUPROFEN 600 MG: 600 TABLET, FILM COATED ORAL at 00:05

## 2024-03-25 RX ADMIN — LEVETIRACETAM 1500 MG: 500 TABLET, FILM COATED ORAL at 21:29

## 2024-03-25 RX ADMIN — DIVALPROEX SODIUM 250 MG: 250 TABLET, FILM COATED, EXTENDED RELEASE ORAL at 10:20

## 2024-03-25 RX ADMIN — DIVALPROEX SODIUM 1000 MG: 500 TABLET, DELAYED RELEASE ORAL at 21:29

## 2024-03-25 RX ADMIN — INSULIN LISPRO 1 UNITS: 100 INJECTION, SOLUTION INTRAVENOUS; SUBCUTANEOUS at 21:33

## 2024-03-25 RX ADMIN — LISINOPRIL 10 MG: 10 TABLET ORAL at 10:16

## 2024-03-25 RX ADMIN — GABAPENTIN 400 MG: 400 CAPSULE ORAL at 17:11

## 2024-03-25 RX ADMIN — OLANZAPINE 5 MG: 10 INJECTION, POWDER, FOR SOLUTION INTRAMUSCULAR at 13:15

## 2024-03-25 RX ADMIN — GABAPENTIN 400 MG: 400 CAPSULE ORAL at 10:16

## 2024-03-25 RX ADMIN — GABAPENTIN 400 MG: 400 CAPSULE ORAL at 21:29

## 2024-03-25 RX ADMIN — PRAVASTATIN SODIUM 80 MG: 40 TABLET ORAL at 17:11

## 2024-03-25 NOTE — PLAN OF CARE
Problem: INFECTION - ADULT  Goal: Absence of fever/infection during neutropenic period  Description: INTERVENTIONS:  - Monitor WBC    Outcome: Progressing     Problem: SAFETY ADULT  Goal: Patient will remain free of falls  Description: INTERVENTIONS:  - Educate patient/family on patient safety including physical limitations  - Instruct patient to call for assistance with activity   - Consult OT/PT to assist with strengthening/mobility   - Keep Call bell within reach  - Keep bed low and locked with side rails adjusted as appropriate  - Keep care items and personal belongings within reach  - Initiate and maintain comfort rounds  - Make Fall Risk Sign visible to staff  - Offer Toileting every 2 Hours, in advance of need  - Initiate/Maintain bed alarm  - Obtain necessary fall risk management equipment: Yellow socks  - Apply yellow socks and bracelet for high fall risk patients  - Consider moving patient to room near nurses station  Outcome: Progressing

## 2024-03-25 NOTE — PROGRESS NOTES
[facility]  Progress Note  Name: Jairon Oconnell I  MRN: [id number]  Unit/Bed#: [facility]-01 I Date of Admission: 3/1/2024   Date of Service: 3/25/2024 I Hospital Day: 24    Assessment/Plan     * Ambulatory dysfunction  Assessment & Plan  With non-operative right hip greater trochanter fracture seen and evaluated by orthopedic surgery on the previous admission  He was discharged to a skilled nursing facility, became agitated, broke a chair, and was reported to become aggressive with staff.  The facility will now not take him back  Psychiatry recommendations appreciated. No indication for inpatient psychiatric hospitalization at this time.  Recommendation is rehab/SNF  PT/OT recommendations appreciated.  Medically clear for discharge to rehab facility   Case management following, input appreciated                  Generalized convulsive epilepsy (HCC)  Assessment & Plan  Continue Depakote  mg daily, Depakote  mg daily, Depakote DR 1000 mg HS (per  must be brand name) Neurontin 400 mg 3 times daily, Vimpat 150 mg daily and 200 mg HS, zonisamide 300 mg HS, and Keppra 1500 mg every 12 hours.   Continue seizure precautions                Type 2 diabetes mellitus with diabetic neuropathy, without long-term current use of insulin (HCC)  Assessment & Plan  Lab Results   Component Value Date    HGBA1C 5.5 11/27/2023       Recent Labs     03/23/24  1045 03/23/24  1609 03/23/24  2055 03/24/24  0748   POCGLU 144* 142* 153* 138       Blood Sugar Average: Last 72 hrs:  (P) 150.6696889981215650    Continue diabetic diet        Continue fingerstick blood sugar with sliding scale coverage  Hypoglycemia protocol        Urinary retention  Assessment & Plan  S/p chapman catheter, since discontinued   Continue tamsulosin  Continue urinary retention protocol               Pressure ulcer of sacral region, stage 3 (HCC)  Assessment & Plan  POA  Wound care recommendations appreciated:  Scarring to the left lateral buttock from stage 3 pressure injury in the past. Scarring is intact and blanchable and hyperpigmented   Turn and reposition every 2 hours, frequent offloading                Behavior concern in adult  Assessment & Plan  He has been cooperative without any agitation or behavioral issues    Seroquel XR decreased to 150 BID and 300 HS due to excessive sleepiness               Acquired hypothyroidism  Assessment & Plan  Continue levothyroxine 150 mcg p.o. every morning                 Essential hypertension  Assessment & Plan  Continue lisinopril 10 mg daily  Monitor blood pressure                  Mixed hyperlipidemia  Assessment & Plan  Continue Pravachol 80 mg oral daily at bedtime                Ataxic cerebral palsy (HCC)  Assessment & Plan  Continue supportive care   Continue current medications                    VTE Pharmacologic Prophylaxis: VTE Score: 3 Moderate Risk (Score 3-4) - Pharmacological DVT Prophylaxis Ordered: enoxaparin (Lovenox).    Mobility:   Basic Mobility Inpatient Raw Score: 19  JH-HLM Goal: 6: Walk 10 steps or more  JH-HLM Achieved: 6: Walk 10 steps or more  JH-HLM Goal achieved. Continue to encourage appropriate mobility.    Patient Centered Rounds: I performed bedside rounds with nursing staff today.   Discussions with Specialists or Other Care Team Provider: CM    Education and Discussions with Family / Patient:  No medical updates to provide. .     Total Time Spent on Date of Encounter in care of patient: 25 mins. This time was spent on one or more of the following: performing physical exam; counseling and coordination of care; obtaining or reviewing history; documenting in the medical record; reviewing/ordering tests, medications or procedures; communicating with other healthcare professionals and discussing with patient's family/caregivers.    Current Length of Stay: 24 day(s)  Current Patient Status: Inpatient   Certification Statement: The patient will  continue to require additional inpatient hospital stay due to care coordination.   Discharge Plan: Anticipate discharge in >72 hrs to rehab facility.    Code Status: Level 1 - Full Code    Subjective:   Patient seen and examined.  Unable to obtain review of systems.    Objective:     Vitals:   Temp (24hrs), Av.5 °F (36.4 °C), Min:96.6 °F (35.9 °C), Max:98 °F (36.7 °C)    Temp:  [96.6 °F (35.9 °C)-98 °F (36.7 °C)] 98 °F (36.7 °C)  HR:  [82] 82  Resp:  [18-20] 18  BP: (104-143)/(52-78) 104/52  SpO2:  [97 %] 97 %  Body mass index is 23.23 kg/m².     Input and Output Summary (last 24 hours):     Intake/Output Summary (Last 24 hours) at 3/25/2024 0854  Last data filed at 3/25/2024 0600  Gross per 24 hour   Intake 2340 ml   Output --   Net 2340 ml       Physical Exam:   Physical Exam  Vitals and nursing note reviewed.   Constitutional:       General: He is sleeping. He is not in acute distress.  HENT:      Head: Normocephalic and atraumatic.      Nose: Nose normal.      Mouth/Throat:      Mouth: Mucous membranes are moist.      Pharynx: Oropharynx is clear.   Eyes:      Pupils: Pupils are equal, round, and reactive to light.   Cardiovascular:      Rate and Rhythm: Normal rate and regular rhythm.      Pulses: Normal pulses.      Heart sounds: Normal heart sounds.   Pulmonary:      Effort: Pulmonary effort is normal. No respiratory distress.      Breath sounds: Normal breath sounds.   Abdominal:      General: Bowel sounds are normal.      Palpations: Abdomen is soft.      Tenderness: There is no abdominal tenderness.   Musculoskeletal:      Cervical back: Neck supple.      Right lower leg: No edema.      Left lower leg: No edema.   Skin:     General: Skin is warm and dry.      Capillary Refill: Capillary refill takes less than 2 seconds.   Neurological:      General: No focal deficit present.      Mental Status: Mental status is at baseline.                    Results from last 7 days   Lab Units 24  0648  03/24/24  2106 03/24/24  0748 03/23/24  2055 03/23/24  1609 03/23/24  1045 03/23/24  0725 03/22/24  2105 03/22/24  1553 03/22/24  1113 03/22/24  0708 03/21/24  2110   POC GLUCOSE mg/dl 142* 122 138 153* 142* 144* 145* 197* 143* 174* 132 128               Lines/Drains:  Invasive Devices       None                       Last 24 Hours Medication List:   Current Facility-Administered Medications   Medication Dose Route Frequency Provider Last Rate    acetaminophen  650 mg Oral 4x Daily PRN Lizzie Jimi, CRNP      divalproex sodium  250 mg Oral Daily Lizzie Jimi, CRNP      divalproex sodium  1,000 mg Oral HS Lizzie Jimi, CRNP      divalproex sodium  500 mg Oral Daily Lizzie Jimi, CRNP      docusate sodium  100 mg Oral BID PRN Hasmukh Becker PA-C      enoxaparin  40 mg Subcutaneous Daily Hasmukh Becker PA-C      gabapentin  400 mg Oral TID Hasmukh Becker PA-C      ibuprofen  600 mg Oral Q6H PRN Ancelmo Mota PA-C      insulin lispro  1-5 Units Subcutaneous TID AC Hasmukh Becker, JAIR      insulin lispro  1-6 Units Subcutaneous HS Hasmukh Becker PA-C      lacosamide  150 mg Oral Daily Hasmukh Becker, JAIR      lacosamide  200 mg Oral HS Hasmukh Becker PA-C      levETIRAcetam  1,500 mg Oral Q12H [facility] Hasmukh Becker PA-C      levothyroxine  150 mcg Oral Daily Hasmukh Becker PA-C      lisinopril  10 mg Oral Daily Hasmukh Becker, JAIR      loratadine  10 mg Oral Daily Hasmukh Becker, JAIR      multivitamin stress formula  1 tablet Oral Daily Hasmukh Becker PA-C      OLANZapine  5 mg Intramuscular Q3H PRN Hasmukh Becker PA-C      pravastatin  80 mg Oral Daily With Dinner MORENA FofanaC      QUEtiapine  150 mg Oral BID Lizzie Krause, HUNTER      QUEtiapine  300 mg Oral HS HUNTER Lopez      tamsulosin  0.4 mg Oral Daily With Dinner Lizzie Jimi, CRNP      temazepam  15 mg Oral HS PRN Hasmukh Becker PA-C      zonisamide  300 mg Oral HS Hasmukh Becker PA-C          Today, Patient Was Seen By: Ashley TUCKER  HUNTER Good    **Please Note: This note may have been constructed using a voice recognition system.**

## 2024-03-25 NOTE — PHYSICAL THERAPY NOTE
"   PHYSICAL THERAPY TREATMENT NOTE  NAME:  Jairon Oconnell  DATE: 03/25/24    Length Of Stay: 24  Performed at least 2 patient identifiers during session: Name and ID bracelet    TREATMENT FLOWSHEET:    03/25/24 1451   PT Last Visit   PT Visit Date 03/25/24   Note Type   Note Type Treatment   Pain Assessment   Pain Assessment Tool 0-10   Pain Score No Pain   Restrictions/Precautions   Weight Bearing Precautions Per Order No   RLE Weight Bearing Per Order WBAT   Other Precautions Agitated;Cognitive;Chair Alarm;Bed Alarm;Fall Risk   General   Chart Reviewed Yes   Response to Previous Treatment Patient unable to report, no changes reported from family or staff   Family/Caregiver Present No   Cognition   Overall Cognitive Status Impaired   Arousal/Participation Alert;Responsive   Attention Difficulty attending to directions   Orientation Level Oriented to person;Oriented to place   Memory Unable to assess   Following Commands Follows one step commands with increased time or repetition   Subjective   Subjective \"A short one.\"   Bed Mobility   Supine to Sit 5  Supervision   Additional items Assist x 1;Bedrails;Increased time required;Verbal cues   Sit to Supine 5  Supervision   Additional items Assist x 1;Bedrails;Increased time required;Verbal cues   Transfers   Sit to Stand 5  Supervision   Additional items Assist x 1;Increased time required;Verbal cues  (RW)   Stand to Sit 5  Supervision   Additional items Assist x 1;Increased time required;Verbal cues   Stand pivot 5  Supervision   Additional items Assist x 1;Increased time required;Verbal cues  (RW)   Additional Comments VC's for proper hand placement during transitions   Ambulation/Elevation   Gait pattern Improper Weight shift;Decreased foot clearance;Forward Flexion;Narrow GORGE;Decreased heel strike;Decreased toe off;Step through pattern   Gait Assistance 5  Supervision   Additional items Assist x 1;Verbal cues   Assistive Device Rolling walker   Distance 25 ft "   Stair Management Assistance Not tested   Balance   Static Sitting Good   Dynamic Sitting Fair +   Static Standing Fair -   Dynamic Standing Fair -   Ambulatory Fair -   Endurance Deficit   Endurance Deficit Yes   Activity Tolerance   Activity Tolerance Patient limited by fatigue   Assessment   Prognosis Fair   Problem List Decreased strength;Decreased endurance;Decreased mobility;Decreased coordination;Decreased cognition;Impaired judgement;Decreased safety awareness;Pain;Orthopedic restrictions   Goals   Patient Goals none verbalized   PT Treatment Day 3   Plan   Treatment/Interventions Functional transfer training;LE strengthening/ROM;Endurance training;Cognitive reorientation;Patient/family training;Equipment eval/education;Bed mobility;Gait training;Compensatory technique education;Spoke to nursing   Progress Slow progress, decreased activity tolerance   PT Frequency 3-5x/wk   Discharge Recommendation   Rehab Resource Intensity Level, PT II (Moderate Resource Intensity)   AM-PAC Basic Mobility Inpatient   Turning in Flat Bed Without Bedrails 4   Lying on Back to Sitting on Edge of Flat Bed Without Bedrails 4   Moving Bed to Chair 3   Standing Up From Chair Using Arms 3   Walk in Room 3   Climb 3-5 Stairs With Railing 2   Basic Mobility Inpatient Raw Score 19   Basic Mobility Standardized Score 42.48   [facility] Highest Level Of Mobility   -HL Goal 6: Walk 10 steps or more   -HLM Achieved 7: Walk 25 feet or more         The patient's AM-[facility] Basic Mobility Inpatient Short Form Raw Score is 19. A raw score greater than 16 suggests the patient may benefit from discharge to home. Please also refer to the recommendation of the Physical Therapist for safe discharge planning.    Pt seen for PT treatment session this date with interventions consisting of bed mobility tasks, transfer training, gait training, and education provided as needed for safety and direction to improve functional mobility, safety  awareness, and activity tolerance. Pt agreeable to PT treatment session upon arrival, pt found resting in bed. At end of session, pt left in bed with bed alarm activated, and with all needs in reach. In comparison to previous session, pt with improvements in ambulation distance . Continue to recommend Level II (Moderate Resource Intensity at time of d/c in order to maximize pt's functional independence and safety w/ mobility. Pt continues to be functioning below baseline level. PT will continue to see pt while here in order to address the deficits listed above and provide interventions consistent w/ POC in effort to achieve STGs.    Elli Knowles, PTA

## 2024-03-25 NOTE — PLAN OF CARE
Problem: PHYSICAL THERAPY ADULT  Goal: Performs mobility at highest level of function for planned discharge setting.  See evaluation for individualized goals.  Description: Treatment/Interventions: Functional transfer training, LE strengthening/ROM, Elevations, Therapeutic exercise, Endurance training, Cognitive reorientation, Patient/family training, Equipment eval/education, Bed mobility, Spoke to nursing, Spoke to case management          See flowsheet documentation for full assessment, interventions and recommendations.  Outcome: Progressing  Note: Prognosis: Fair  Problem List: Decreased strength, Decreased endurance, Decreased mobility, Decreased coordination, Decreased cognition, Impaired judgement, Decreased safety awareness, Pain, Orthopedic restrictions  Assessment: Pt seen for PT treatment session this date with interventions consisting of bed mobility tasks, transfer training, gait training, and education provided as needed for safety and direction to improve functional mobility, safety awareness, and activity tolerance. Pt agreeable to PT treatment session upon arrival, pt found resting in bed. At end of session, pt left in bed with bed alarm activated, and with all needs in reach. In comparison to previous session, pt with improvements in ambulation distance . Continue to recommend Level II (Moderate Resource Intensity at time of d/c in order to maximize pt's functional independence and safety w/ mobility. Pt continues to be functioning below baseline level. PT will continue to see pt while here in order to address the deficits listed above and provide interventions consistent w/ POC in effort to achieve STGs.  Barriers to Discharge: Other (Comment) (pt resides in a group home setting)     Rehab Resource Intensity Level, PT: II (Moderate Resource Intensity)    See flowsheet documentation for full assessment.

## 2024-03-26 LAB
GLUCOSE SERPL-MCNC: 127 MG/DL (ref 65–140)
GLUCOSE SERPL-MCNC: 142 MG/DL (ref 65–140)
GLUCOSE SERPL-MCNC: 154 MG/DL (ref 65–140)
GLUCOSE SERPL-MCNC: 190 MG/DL (ref 65–140)

## 2024-03-26 PROCEDURE — 82948 REAGENT STRIP/BLOOD GLUCOSE: CPT

## 2024-03-26 PROCEDURE — 99233 SBSQ HOSP IP/OBS HIGH 50: CPT | Performed by: NURSE PRACTITIONER

## 2024-03-26 RX ORDER — TEMAZEPAM 7.5 MG/1
30 CAPSULE ORAL
Status: DISCONTINUED | OUTPATIENT
Start: 2024-03-26 | End: 2024-04-01 | Stop reason: HOSPADM

## 2024-03-26 RX ADMIN — LEVETIRACETAM 1500 MG: 500 TABLET, FILM COATED ORAL at 11:03

## 2024-03-26 RX ADMIN — QUETIAPINE FUMARATE 300 MG: 150 TABLET, EXTENDED RELEASE ORAL at 21:16

## 2024-03-26 RX ADMIN — TAMSULOSIN HYDROCHLORIDE 0.4 MG: 0.4 CAPSULE ORAL at 16:46

## 2024-03-26 RX ADMIN — INSULIN LISPRO 1 UNITS: 100 INJECTION, SOLUTION INTRAVENOUS; SUBCUTANEOUS at 16:49

## 2024-03-26 RX ADMIN — GABAPENTIN 400 MG: 400 CAPSULE ORAL at 21:17

## 2024-03-26 RX ADMIN — ENOXAPARIN SODIUM 40 MG: 40 INJECTION SUBCUTANEOUS at 11:02

## 2024-03-26 RX ADMIN — INSULIN LISPRO 2 UNITS: 100 INJECTION, SOLUTION INTRAVENOUS; SUBCUTANEOUS at 21:22

## 2024-03-26 RX ADMIN — GABAPENTIN 400 MG: 400 CAPSULE ORAL at 11:02

## 2024-03-26 RX ADMIN — LACOSAMIDE 200 MG: 150 TABLET ORAL at 21:16

## 2024-03-26 RX ADMIN — LEVETIRACETAM 1500 MG: 500 TABLET, FILM COATED ORAL at 21:16

## 2024-03-26 RX ADMIN — B-COMPLEX W/ C & FOLIC ACID TAB 1 TABLET: TAB at 11:01

## 2024-03-26 RX ADMIN — DIVALPROEX SODIUM 1000 MG: 500 TABLET, DELAYED RELEASE ORAL at 21:17

## 2024-03-26 RX ADMIN — QUETIAPINE FUMARATE 150 MG: 150 TABLET, EXTENDED RELEASE ORAL at 16:45

## 2024-03-26 RX ADMIN — LEVOTHYROXINE SODIUM 150 MCG: 75 TABLET ORAL at 11:02

## 2024-03-26 RX ADMIN — PRAVASTATIN SODIUM 80 MG: 40 TABLET ORAL at 16:45

## 2024-03-26 RX ADMIN — LISINOPRIL 10 MG: 10 TABLET ORAL at 11:03

## 2024-03-26 RX ADMIN — LORATADINE 10 MG: 10 TABLET ORAL at 11:05

## 2024-03-26 RX ADMIN — LACOSAMIDE 150 MG: 150 TABLET ORAL at 11:06

## 2024-03-26 RX ADMIN — QUETIAPINE FUMARATE 150 MG: 150 TABLET, EXTENDED RELEASE ORAL at 11:02

## 2024-03-26 RX ADMIN — DIVALPROEX SODIUM 500 MG: 500 TABLET, DELAYED RELEASE ORAL at 11:04

## 2024-03-26 RX ADMIN — TEMAZEPAM 30 MG: 7.5 CAPSULE ORAL at 21:16

## 2024-03-26 RX ADMIN — ZONISAMIDE 300 MG: 100 CAPSULE ORAL at 21:16

## 2024-03-26 RX ADMIN — DIVALPROEX SODIUM 250 MG: 250 TABLET, FILM COATED, EXTENDED RELEASE ORAL at 11:04

## 2024-03-26 RX ADMIN — GABAPENTIN 400 MG: 400 CAPSULE ORAL at 16:46

## 2024-03-26 NOTE — PROGRESS NOTES
[facility]  Progress Note  Name: Jairon Oconnell I  MRN: [id number]  Unit/Bed#: [facility]-01 I Date of Admission: 3/1/2024   Date of Service: 3/26/2024 I Hospital Day: 25    Assessment/Plan     * Ambulatory dysfunction  Assessment & Plan  With non-operative right hip greater trochanter fracture seen and evaluated by orthopedic surgery on the previous admission  He was discharged to a skilled nursing facility, became agitated, broke a chair, and was reported to become aggressive with staff.  The facility will now not take him back  Psychiatry recommendations appreciated. No indication for inpatient psychiatric hospitalization at this time.  Recommendation is rehab/SNF  PT/OT recommendations appreciated.  Medically clear for discharge to rehab facility   Case management following, input appreciated. Patient to be possibly discharged to a new life sharing home                 Generalized convulsive epilepsy (HCC)  Assessment & Plan  Continue Depakote  mg daily, Depakote  mg daily, Depakote DR 1000 mg HS (per  must be brand name) Neurontin 400 mg 3 times daily, Vimpat 150 mg daily and 200 mg HS, zonisamide 300 mg HS, and Keppra 1500 mg every 12 hours.   Continue seizure precautions                Type 2 diabetes mellitus with diabetic neuropathy, without long-term current use of insulin (HCC)  Assessment & Plan  Lab Results   Component Value Date    HGBA1C 5.5 11/27/2023       Recent Labs     03/25/24  1039 03/25/24  1607 03/25/24  2126 03/26/24  0736   POCGLU 145* 142* 177* 127         Blood Sugar Average: Last 72 hrs:  (P) 143.5625014116402876    Continue diabetic diet        Continue fingerstick blood sugar with sliding scale coverage  Hypoglycemia protocol        Urinary retention  Assessment & Plan  S/p chapman catheter, since discontinued   Continue tamsulosin  Continue urinary retention protocol               Pressure ulcer of sacral region, stage 3  (HCC)  Assessment & Plan  POA  Wound care recommendations appreciated: Scarring to the left lateral buttock from stage 3 pressure injury in the past. Scarring is intact and blanchable and hyperpigmented   Turn and reposition every 2 hours, frequent offloading                Behavior concern in adult  Assessment & Plan  He has been mostly cooperative without any agitation or behavioral issues, however recently became upset and refused some care. His sister at bedside takes this to be frustration from him that he is still in the hospital   Seroquel XR decreased to 150 BID and 300 HS due to excessive sleepiness    Per nursing report, patient has not been sleeping well.  He had Restoril 15 mg as needed which generally has been given.  Will increase dose to 30 mg nightly as a routine order     Monitor behaviors, assess sleep-wake cycle, monitor for excessive sleepiness          Acquired hypothyroidism  Assessment & Plan  Continue levothyroxine 150 mcg PO daily              Essential hypertension  Assessment & Plan  Continue lisinopril 10 mg daily  Monitor blood pressure                  Mixed hyperlipidemia  Assessment & Plan  Continue Pravachol 80 mg oral daily at bedtime                Ataxic cerebral palsy (HCC)  Assessment & Plan  Continue supportive care   Continue current medications                      VTE Pharmacologic Prophylaxis: VTE Score: 3 Moderate Risk (Score 3-4) - Pharmacological DVT Prophylaxis Ordered: enoxaparin (Lovenox).    Mobility:   Basic Mobility Inpatient Raw Score: 19  JH-HLM Goal: 6: Walk 10 steps or more  JH-HLM Achieved: 6: Walk 10 steps or more  JH-HLM Goal achieved. Continue to encourage appropriate mobility.    Patient Centered Rounds: I performed bedside rounds with nursing staff today.   Discussions with Specialists or Other Care Team Provider: GORDON    Education and Discussions with Family / Patient:  No medical updates to provide at this time..     Total Time Spent on Date of Encounter  in care of patient: 35 mins. This time was spent on one or more of the following: performing physical exam; counseling and coordination of care; obtaining or reviewing history; documenting in the medical record; reviewing/ordering tests, medications or procedures; communicating with other healthcare professionals and discussing with patient's family/caregivers.    Current Length of Stay: 25 day(s)  Current Patient Status: Inpatient   Certification Statement: The patient will continue to require additional inpatient hospital stay due to care coordination.   Discharge Plan: Anticipate discharge tomorrow to [facility].    Code Status: Level 1 - Full Code    Subjective:   Patient seen and examined.  He had a blanket over his head.  I asked him if I could remove it to talk to him.  He said yes.  I asked him if he had a tough night and he said yes.  Nursing reports he is having difficulty sleeping at night.    Objective:     Vitals:   Temp (24hrs), Av.9 °F (36.6 °C), Min:97 °F (36.1 °C), Max:98.4 °F (36.9 °C)    Temp:  [97 °F (36.1 °C)-98.4 °F (36.9 °C)] 97 °F (36.1 °C)  HR:  [79] 79  Resp:  [20] 20  BP: (121-143)/(63-86) 122/82  SpO2:  [97 %] 97 %  Body mass index is 22.75 kg/m².     Input and Output Summary (last 24 hours):     Intake/Output Summary (Last 24 hours) at 3/26/2024 0753  Last data filed at 3/25/2024 1700  Gross per 24 hour   Intake 240 ml   Output --   Net 240 ml       Physical Exam:     Physical Exam  Vitals and nursing note reviewed.   Constitutional:       General: He is not in acute distress.  HENT:      Head: Normocephalic and atraumatic.      Nose: Nose normal.      Mouth/Throat:      Mouth: Mucous membranes are moist.      Pharynx: Oropharynx is clear.   Eyes:      Pupils: Pupils are equal, round, and reactive to light.   Cardiovascular:      Rate and Rhythm: Normal rate and regular rhythm.      Pulses: Normal pulses.      Heart sounds: Normal heart sounds.   Pulmonary:      Effort: Pulmonary  effort is normal. No respiratory distress.      Breath sounds: Normal breath sounds.   Abdominal:      General: Bowel sounds are normal.      Palpations: Abdomen is soft.      Tenderness: There is no abdominal tenderness.   Musculoskeletal:      Cervical back: Neck supple.      Right lower leg: No edema.      Left lower leg: No edema.   Skin:     General: Skin is warm and dry.      Capillary Refill: Capillary refill takes less than 2 seconds.   Neurological:      General: No focal deficit present.      Mental Status: He is alert. Mental status is at baseline.                      Results from last 7 days   Lab Units 03/26/24  0736 03/25/24  2126 03/25/24  1607 03/25/24  1039 03/25/24  0647 03/24/24  2106 03/24/24  0748 03/23/24  2055 03/23/24  1609 03/23/24  1045 03/23/24  0725 03/22/24  2105   POC GLUCOSE mg/dl 127 177* 142* 145* 142* 122 138 153* 142* 144* 145* 197*               Lines/Drains:  Invasive Devices       None                   Last 24 Hours Medication List:   Current Facility-Administered Medications   Medication Dose Route Frequency Provider Last Rate    acetaminophen  650 mg Oral 4x Daily PRN Lizzie Jimi, CRNP      divalproex sodium  250 mg Oral Daily Lizzie Jimi, CRNP      divalproex sodium  1,000 mg Oral HS Lizzie Jimi, CRNP      divalproex sodium  500 mg Oral Daily Lizzie Jimi, CRNP      docusate sodium  100 mg Oral BID PRN Hasmukh Becker PA-C      enoxaparin  40 mg Subcutaneous Daily Hasmukh Becker PA-C      gabapentin  400 mg Oral TID Hasmukh Becker PA-C      ibuprofen  600 mg Oral Q6H PRN Ancelmo Mota PA-C      insulin lispro  1-5 Units Subcutaneous TID AC Hasmukh Becker PA-C      insulin lispro  1-6 Units Subcutaneous HS Hasmukh Becker PA-C      lacosamide  150 mg Oral Daily Hasmukh Becker PA-C      lacosamide  200 mg Oral HS Hasmukh Becker PA-C      levETIRAcetam  1,500 mg Oral Q12H Count includes the [facility] Hasmukh Becker PA-C      levothyroxine  150 mcg Oral Daily Hasmukh Becker PA-C       lisinopril  10 mg Oral Daily Hasmukh Becker PA-C      loratadine  10 mg Oral Daily Hasmukh Becker PA-C      multivitamin stress formula  1 tablet Oral Daily Hasmukh Becker PA-C      OLANZapine  5 mg Intramuscular Q3H PRN Hasmukh Becker PA-C      pravastatin  80 mg Oral Daily With Dinner Hasmukh Becker PA-C      QUEtiapine  150 mg Oral BID HUNTER Lopez      QUEtiapine  300 mg Oral HS HUNTER Lopez      tamsulosin  0.4 mg Oral Daily With Dinner HUNTER Lopez      temazepam  30 mg Oral HS HUNTER Main      zonisamide  300 mg Oral HS Hasmukh Becker PA-C          Today, Patient Was Seen By: HUNTER Main    **Please Note: This note may have been constructed using a voice recognition system.**

## 2024-03-26 NOTE — PHYSICAL THERAPY NOTE
03/26/24 1439   PT Last Visit   PT Visit Date 03/26/24   Note Type   Note Type Cancelled Session   Cancel Reasons Refusal                    Physical Therapy Cancellation Note       PT treatment offered this afternoon but despite education and encouragement patient refused to participate.  Will continue to offer PT treatment as ordered and progress as able when patient is willing to participate.         Elli Knowles, PTA

## 2024-03-26 NOTE — CASE MANAGEMENT
Case Management Discharge Planning Note    Patient name Jairon Oconnell  Location /-01 MRN [id number]  : 1965 Date 3/26/2024       Current Admission Date: 3/1/2024  Current Admission Diagnosis:Ambulatory dysfunction   Patient Active Problem List    Diagnosis Date Noted    Urinary retention 2024    Nondisplaced fracture of greater trochanter of right femur, subsequent encounter for closed fracture with routine healing 2024    Closed nondisplaced fracture of proximal phalanx of left index finger with routine healing, subsequent encounter 10/30/2023    Pressure ulcer of sacral region, stage 3 (HCC) 2023    Ambulatory dysfunction 2022    Social problem 2021    Hypomagnesemia 2021    Thrombocytopathia ([facility]) 2020    Hyponatremia 2019    Metabolic encephalopathy 2019    Seborrheic dermatitis of scalp 2019    Nearsightedness 2019    Behavior concern in adult 2019    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HCC) 2019    Vitamin B12 deficiency 2017    Mixed hyperlipidemia 2016    Vitamin D deficiency 2016    Type 2 diabetes mellitus with diabetic neuropathy, without long-term current use of insulin ([facility]) 06/15/2016    Acquired hypothyroidism 06/15/2016    Cataract 2013    Ataxic cerebral palsy ([facility]) 2013    Generalized convulsive epilepsy ([facility]) 2013    Essential hypertension 2013    Osteoporosis 2013    Scoliosis 2013      LOS (days): 25  Geometric Mean LOS (GMLOS) (days): 3.9  Days to GMLOS:-21.6     OBJECTIVE:  Risk of Unplanned Readmission Score: 27.74         Current admission status: Inpatient   Preferred Pharmacy:   [facility] [place] - [address]  [address] [address]  Phone: [phone] Fax: [phone]    [id number] [place] - [address] [address]  Phone:  [phone] Fax: [phone]    Primary Care Provider: HUNTER Brown    Primary Insurance: MEDICARE  Secondary Insurance: [place] [employer]    DISCHARGE DETAILS:    Discharge planning discussed with:: Madeline mondragon cm) was called at 13:24pm  Freedom of Choice: Yes  Comments - Freedom of Choice: recommendation is moderate level-ll- cm has not been able to find a snf that will accept the pt- pt is going to a new life sharin home [facility] Sara Correa is in agreement with [facility] to be [facility] - referrals sent via maribel with permission     Were Treatment Team discharge recommendations reviewed with patient/caregiver?: Yes          Contacts  Patient Contacts: Madeline Arvizu  Relationship to Patient:: Other (Comment) (cm from [facility])  Contact Method: Phone  Phone Number: [phone]  Reason/Outcome: Discharge Planning    Requested Home Health Care         Is the patient interested in [facility] at discharge?: Yes  Home Health Discipline requested:: Physical Therapy, Nursing  Home Health Agency Name:: Other  Home Health Follow-Up Provider:: PCP  Home Health Services Needed:: Diabetes Management, Wound/Ostomy Care, Strengthening/Theraputic Exercises to Improve Function  Homebound Criteria Met:: Requires the Assistance of Another Person for Safe Ambulation or to Leave the Home, Uses an Assist Device (i.e. cane, walker, etc)  Supporting Clincal Findings:: Limited Endurance, Cognitive Deficit Requiring the Assistance of Others         Other Referral/Resources/Interventions Provided:  Interventions: [facility]  Referral Comments: [facility] referrals sentp  pt to be d/c to his new life sharing home on 3/30/2024    Would you like to participate in our [facility] service program?  : No - Declined    Treatment Team Recommendation: Home with Home Health Care (home with caregiver & [facility] & outpt follow up- tbd)

## 2024-03-26 NOTE — ASSESSMENT & PLAN NOTE
With non-operative right hip greater trochanter fracture seen and evaluated by orthopedic surgery on the previous admission  He was discharged to a skilled nursing facility, became agitated, broke a chair, and was reported to become aggressive with staff.  The facility will now not take him back  Psychiatry recommendations appreciated. No indication for inpatient psychiatric hospitalization at this time.  Recommendation is rehab/SNF  PT/OT recommendations appreciated.  Medically clear for discharge to rehab facility   Case management following, input appreciated. Patient to be possibly discharged to a new life sharing home

## 2024-03-26 NOTE — ASSESSMENT & PLAN NOTE
He has been mostly cooperative without any agitation or behavioral issues, however recently became upset and refused some care. His sister at bedside takes this to be frustration from him that he is still in the hospital   Seroquel XR decreased to 150 BID and 300 HS due to excessive sleepiness    Per nursing report, patient has not been sleeping well.  He had Restoril 15 mg as needed which generally has been given.  Will increase dose to 30 mg nightly as a routine order     Monitor behaviors, assess sleep-wake cycle, monitor for excessive sleepiness

## 2024-03-26 NOTE — ASSESSMENT & PLAN NOTE
Lab Results   Component Value Date    HGBA1C 5.5 11/27/2023       Recent Labs     03/25/24  1039 03/25/24  1607 03/25/24  2126 03/26/24  0736   POCGLU 145* 142* 177* 127         Blood Sugar Average: Last 72 hrs:  (P) 143.7129588615603762    Continue diabetic diet        Continue fingerstick blood sugar with sliding scale coverage  Hypoglycemia protocol

## 2024-03-26 NOTE — PLAN OF CARE
Problem: INFECTION - ADULT  Goal: Absence or prevention of progression during hospitalization  Description: INTERVENTIONS:  - Assess and monitor for signs and symptoms of infection  - Monitor lab/diagnostic results  - Monitor all insertion sites, i.e. indwelling lines, tubes, and drains  - Monitor endotracheal if appropriate and nasal secretions for changes in amount and color  - [place] appropriate cooling/warming therapies per order  - Administer medications as ordered  - Instruct and encourage patient and family to use good hand hygiene technique  - Identify and instruct in appropriate isolation precautions for identified infection/condition  Outcome: Progressing  Goal: Absence of fever/infection during neutropenic period  Description: INTERVENTIONS:  - Monitor WBC    Outcome: Progressing     Problem: PAIN - ADULT  Goal: Verbalizes/displays adequate comfort level or baseline comfort level  Description: Interventions:  - Encourage patient to monitor pain and request assistance  - Assess pain using appropriate pain scale  - Administer analgesics based on type and severity of pain and evaluate response  - Implement non-pharmacological measures as appropriate and evaluate response  - Consider cultural and social influences on pain and pain management  - Notify physician/advanced practitioner if interventions unsuccessful or patient reports new pain  Outcome: Progressing

## 2024-03-27 DIAGNOSIS — E08.00 DIABETES MELLITUS DUE TO UNDERLYING CONDITION WITH HYPEROSMOLARITY WITHOUT COMA, WITHOUT LONG-TERM CURRENT USE OF INSULIN (HCC): ICD-10-CM

## 2024-03-27 LAB
GLUCOSE SERPL-MCNC: 131 MG/DL (ref 65–140)
GLUCOSE SERPL-MCNC: 152 MG/DL (ref 65–140)
GLUCOSE SERPL-MCNC: 160 MG/DL (ref 65–140)
GLUCOSE SERPL-MCNC: 190 MG/DL (ref 65–140)

## 2024-03-27 PROCEDURE — 97530 THERAPEUTIC ACTIVITIES: CPT

## 2024-03-27 PROCEDURE — 97110 THERAPEUTIC EXERCISES: CPT

## 2024-03-27 PROCEDURE — 97116 GAIT TRAINING THERAPY: CPT

## 2024-03-27 PROCEDURE — 99231 SBSQ HOSP IP/OBS SF/LOW 25: CPT | Performed by: NURSE PRACTITIONER

## 2024-03-27 PROCEDURE — 82948 REAGENT STRIP/BLOOD GLUCOSE: CPT

## 2024-03-27 RX ADMIN — ENOXAPARIN SODIUM 40 MG: 40 INJECTION SUBCUTANEOUS at 08:06

## 2024-03-27 RX ADMIN — ZONISAMIDE 300 MG: 100 CAPSULE ORAL at 22:07

## 2024-03-27 RX ADMIN — PRAVASTATIN SODIUM 80 MG: 40 TABLET ORAL at 17:10

## 2024-03-27 RX ADMIN — GABAPENTIN 400 MG: 400 CAPSULE ORAL at 08:06

## 2024-03-27 RX ADMIN — LACOSAMIDE 150 MG: 150 TABLET ORAL at 08:06

## 2024-03-27 RX ADMIN — TAMSULOSIN HYDROCHLORIDE 0.4 MG: 0.4 CAPSULE ORAL at 17:10

## 2024-03-27 RX ADMIN — ACETAMINOPHEN 650 MG: 325 TABLET ORAL at 16:39

## 2024-03-27 RX ADMIN — DIVALPROEX SODIUM 1000 MG: 500 TABLET, DELAYED RELEASE ORAL at 22:06

## 2024-03-27 RX ADMIN — QUETIAPINE FUMARATE 300 MG: 150 TABLET, EXTENDED RELEASE ORAL at 22:08

## 2024-03-27 RX ADMIN — LEVETIRACETAM 1500 MG: 500 TABLET, FILM COATED ORAL at 22:08

## 2024-03-27 RX ADMIN — INSULIN LISPRO 1 UNITS: 100 INJECTION, SOLUTION INTRAVENOUS; SUBCUTANEOUS at 11:55

## 2024-03-27 RX ADMIN — LEVOTHYROXINE SODIUM 150 MCG: 75 TABLET ORAL at 08:06

## 2024-03-27 RX ADMIN — LORATADINE 10 MG: 10 TABLET ORAL at 08:06

## 2024-03-27 RX ADMIN — QUETIAPINE FUMARATE 150 MG: 150 TABLET, EXTENDED RELEASE ORAL at 14:41

## 2024-03-27 RX ADMIN — LEVETIRACETAM 1500 MG: 500 TABLET, FILM COATED ORAL at 08:06

## 2024-03-27 RX ADMIN — LACOSAMIDE 200 MG: 150 TABLET ORAL at 22:07

## 2024-03-27 RX ADMIN — INSULIN LISPRO 2 UNITS: 100 INJECTION, SOLUTION INTRAVENOUS; SUBCUTANEOUS at 22:11

## 2024-03-27 RX ADMIN — INSULIN LISPRO 1 UNITS: 100 INJECTION, SOLUTION INTRAVENOUS; SUBCUTANEOUS at 16:59

## 2024-03-27 RX ADMIN — QUETIAPINE FUMARATE 150 MG: 150 TABLET, EXTENDED RELEASE ORAL at 08:06

## 2024-03-27 RX ADMIN — LISINOPRIL 10 MG: 10 TABLET ORAL at 08:06

## 2024-03-27 RX ADMIN — GABAPENTIN 400 MG: 400 CAPSULE ORAL at 22:07

## 2024-03-27 RX ADMIN — B-COMPLEX W/ C & FOLIC ACID TAB 1 TABLET: TAB at 08:06

## 2024-03-27 RX ADMIN — GABAPENTIN 400 MG: 400 CAPSULE ORAL at 17:10

## 2024-03-27 RX ADMIN — TEMAZEPAM 30 MG: 7.5 CAPSULE ORAL at 22:06

## 2024-03-27 RX ADMIN — DIVALPROEX SODIUM 500 MG: 500 TABLET, DELAYED RELEASE ORAL at 08:07

## 2024-03-27 RX ADMIN — DIVALPROEX SODIUM 250 MG: 250 TABLET, FILM COATED, EXTENDED RELEASE ORAL at 08:07

## 2024-03-27 NOTE — PHYSICAL THERAPY NOTE
"   PHYSICAL THERAPY TREATMENT NOTE  NAME:  Jairon Oconnell  DATE: 03/27/24    Length Of Stay: 26  Performed at least 2 patient identifiers during session: Name and ID bracelet    TREATMENT FLOWSHEET:    03/27/24 1028   PT Last Visit   PT Visit Date 03/27/24   Note Type   Note Type Treatment   Pain Assessment   Pain Assessment Tool 0-10   Pain Score No Pain   Restrictions/Precautions   Weight Bearing Precautions Per Order No   RLE Weight Bearing Per Order WBAT   Other Precautions Agitated;Cognitive;Bed Alarm;Chair Alarm;Fall Risk   General   Chart Reviewed Yes   Response to Previous Treatment Patient unable to report, no changes reported from family or staff   Family/Caregiver Present No   Cognition   Overall Cognitive Status Impaired   Arousal/Participation Alert;Responsive   Attention Difficulty attending to directions   Orientation Level Oriented to person;Oriented to place   Memory Unable to assess   Following Commands Follows one step commands with increased time or repetition   Subjective   Subjective \"ok\"   Bed Mobility   Supine to Sit 5  Supervision   Additional items Assist x 1;Bedrails;Increased time required;Verbal cues   Sit to Supine 5  Supervision   Additional items Assist x 1;Bedrails;Increased time required;Verbal cues   Transfers   Sit to Stand 5  Supervision   Additional items Assist x 1;Bedrails;Increased time required;Verbal cues  (RW)   Stand to Sit 5  Supervision   Additional items Assist x 1;Bedrails;Increased time required;Verbal cues   Stand pivot 5  Supervision   Additional items Assist x 1;Bedrails;Increased time required;Verbal cues  (RW)   Additional Comments VC's for safety and direction   Ambulation/Elevation   Gait pattern Improper Weight shift;Decreased foot clearance;Narrow GORGE;Forward Flexion;Decreased heel strike;Decreased toe off;Step through pattern   Gait Assistance 5  Supervision   Additional items Assist x 1;Verbal cues   Assistive Device Rolling walker   Distance 25 ft " "  Balance   Static Sitting Good   Dynamic Sitting Fair +   Static Standing Fair   Dynamic Standing Fair -   Ambulatory Fair -   Endurance Deficit   Endurance Deficit Yes   Activity Tolerance   Activity Tolerance Patient limited by fatigue   Exercises   Quad Sets Sitting;10 reps;AROM;Bilateral   Heelslides Sitting;10 reps;AROM;Bilateral   Hip Flexion Sitting;10 reps;AROM;Bilateral   Hip Abduction Sitting;10 reps;AROM;Bilateral   Hip Adduction Sitting;10 reps;AROM;Bilateral   Knee AROM Long Arc Quad Sitting;10 reps;AROM;Bilateral   Ankle Pumps Sitting;10 reps;AROM;Bilateral   Marching Sitting;10 reps;AROM;Bilateral   Assessment   Prognosis Fair   Problem List Decreased strength;Decreased endurance;Decreased mobility;Decreased coordination;Decreased cognition;Impaired judgement;Decreased safety awareness;Pain;Orthopedic restrictions   Goals   Patient Goals \"back to bed\"   PT Treatment Day 4   Plan   Treatment/Interventions Functional transfer training;LE strengthening/ROM;Therapeutic exercise;Endurance training;Cognitive reorientation;Patient/family training;Equipment eval/education;Bed mobility;Gait training;Compensatory technique education;Spoke to nursing   Progress Slow progress, decreased activity tolerance  (cog defiicits)   PT Frequency 3-5x/wk   Discharge Recommendation   Rehab Resource Intensity Level, PT II (Moderate Resource Intensity)   AM-PAC Basic Mobility Inpatient   Turning in Flat Bed Without Bedrails 4   Lying on Back to Sitting on Edge of Flat Bed Without Bedrails 4   Moving Bed to Chair 3   Standing Up From Chair Using Arms 3   Walk in Room 3   Climb 3-5 Stairs With Railing 2   Basic Mobility Inpatient Raw Score 19   Basic Mobility Standardized Score 42.48   [facility] Highest Level Of Mobility   -HLM Goal 6: Walk 10 steps or more   -HLM Achieved 7: Walk 25 feet or more       The patient's AM-[facility] Basic Mobility Inpatient Short Form Raw Score is 19. A raw score greater than 16 suggests the " patient may benefit from discharge to home. Please also refer to the recommendation of the Physical Therapist for safe discharge planning.    Pt seen for PT treatment session this date with interventions consisting of bed mobility tasks, transfer training, seated TE, gait training, and education provided as needed for safety and direction to improve functional mobility, safety awareness, and activity tolerance. Pt agreeable to PT treatment session upon arrival, pt found resting in bed. At end of session, pt left in bed with bed alarm activated and with all needs in reach. In comparison to previous session, pt with improvements in amount of activity tolerated . Continue to recommend Level II (Moderate Resource Intensity at time of d/c in order to maximize pt's functional independence and safety w/ mobility. Pt continues to be functioning below baseline level. PT will continue to see pt while here in order to address the deficits listed above and provide interventions consistent w/ POC in effort to achieve STGs.    Elli Knowles, PTA

## 2024-03-27 NOTE — PLAN OF CARE
Problem: PAIN - ADULT  Goal: Verbalizes/displays adequate comfort level or baseline comfort level  Description: Interventions:  - Encourage patient to monitor pain and request assistance  - Assess pain using appropriate pain scale  - Administer analgesics based on type and severity of pain and evaluate response  - Implement non-pharmacological measures as appropriate and evaluate response  - Consider cultural and social influences on pain and pain management  - Notify physician/advanced practitioner if interventions unsuccessful or patient reports new pain  Outcome: Progressing     Problem: INFECTION - ADULT  Goal: Absence or prevention of progression during hospitalization  Description: INTERVENTIONS:  - Assess and monitor for signs and symptoms of infection  - Monitor lab/diagnostic results  - Monitor all insertion sites, i.e. indwelling lines, tubes, and drains  - Monitor endotracheal if appropriate and nasal secretions for changes in amount and color  - [place] appropriate cooling/warming therapies per order  - Administer medications as ordered  - Instruct and encourage patient and family to use good hand hygiene technique  - Identify and instruct in appropriate isolation precautions for identified infection/condition  Outcome: Progressing     Problem: Prexisting or High Potential for Compromised Skin Integrity  Goal: Skin integrity is maintained or improved  Description: INTERVENTIONS:  - Identify patients at risk for skin breakdown  - Assess and monitor skin integrity  - Assess and monitor nutrition and hydration status  - Monitor labs   - Assess for incontinence   - Turn and reposition patient  - Assist with mobility/ambulation  - Relieve pressure over bony prominences  - Avoid friction and shearing  - Provide appropriate hygiene as needed including keeping skin clean and dry  - Evaluate need for skin moisturizer/barrier cream  - Collaborate with interdisciplinary team   - Patient/family teaching  -  Consider wound care consult   Outcome: Progressing

## 2024-03-27 NOTE — ASSESSMENT & PLAN NOTE
With non-operative right hip greater trochanter fracture seen and evaluated by orthopedic surgery on the previous admission  He was discharged to a skilled nursing facility, became agitated, broke a chair, and was reported to become aggressive with staff.  The facility will now not take him back  Psychiatry recommendations appreciated. No indication for inpatient psychiatric hospitalization at this time.  Recommendation is rehab/SNF  PT/OT recommendations appreciated.  Medically clear for discharge to rehab facility   Case management following, input appreciated. Patient to be possibly discharged to a new life sharing home.

## 2024-03-27 NOTE — PROGRESS NOTES
[facility]  Progress Note  Name: Jairon Oconnell I  MRN: [id number]  Unit/Bed#: -01 I Date of Admission: 3/1/2024   Date of Service: 3/27/2024 I Hospital Day: 26    Assessment/Plan   * Ambulatory dysfunction  Assessment & Plan  With non-operative right hip greater trochanter fracture seen and evaluated by orthopedic surgery on the previous admission  He was discharged to a skilled nursing facility, became agitated, broke a chair, and was reported to become aggressive with staff.  The facility will now not take him back  Psychiatry recommendations appreciated. No indication for inpatient psychiatric hospitalization at this time.  Recommendation is rehab/SNF  PT/OT recommendations appreciated.  Medically clear for discharge to rehab facility   Case management following, input appreciated. Patient to be possibly discharged to a new life sharing home.           Urinary retention  Assessment & Plan  S/p chapman catheter, since discontinued   Continue tamsulosin  Continue urinary retention protocol                Pressure ulcer of sacral region, stage 3 (HCC)  Assessment & Plan  POA  Wound care recommendations appreciated: Scarring to the left lateral buttock from stage 3 pressure injury in the past. Scarring is intact and blanchable and hyperpigmented   Turn and reposition every 2 hours, frequent offloading                 Behavior concern in adult  Assessment & Plan  He has been mostly cooperative without any agitation or behavioral issues, however recently became upset and refused some care. His sister at bedside takes this to be frustration from him that he is still in the hospital   Seroquel XR decreased to 150 BID and 300 HS due to excessive sleepiness    Per nursing report, patient has not been sleeping well.  He had Restoril 15 mg as needed which generally has been given. Increased dose to 30 mg nightly as a routine order     Monitor behaviors, assess sleep-wake cycle, monitor for  excessive sleepiness          Acquired hypothyroidism  Assessment & Plan  Continue levothyroxine 150 mcg PO daily               Essential hypertension  Assessment & Plan  Continue lisinopril 10 mg daily  Monitor blood pressure                   Mixed hyperlipidemia  Assessment & Plan  Continue Pravachol 80 mg oral daily at bedtime                 Generalized convulsive epilepsy (HCC)  Assessment & Plan  Continue Depakote  mg daily, Depakote  mg daily, Depakote DR 1000 mg HS (per  must be brand name) Neurontin 400 mg 3 times daily, Vimpat 150 mg daily and 200 mg HS, zonisamide 300 mg HS, and Keppra 1500 mg every 12 hours.   Continue seizure precautions                 Type 2 diabetes mellitus with diabetic neuropathy, without long-term current use of insulin (HCC)  Assessment & Plan  Lab Results   Component Value Date    HGBA1C 5.5 11/27/2023       Recent Labs     03/26/24  1111 03/26/24  1557 03/26/24  2055 03/27/24  0704   POCGLU 142* 154* 190* 131       Blood Sugar Average: Last 72 hrs:  (P) 146.3340805242553339    Continue diabetic diet        Continue fingerstick blood sugar with sliding scale coverage  Hypoglycemia protocol         Ataxic cerebral palsy (HCC)  Assessment & Plan  Continue supportive care   Continue current medications                           VTE Pharmacologic Prophylaxis: VTE Score: 3 Moderate Risk (Score 3-4) - Pharmacological DVT Prophylaxis Ordered: enoxaparin (Lovenox).    Mobility:   Basic Mobility Inpatient Raw Score: 19  JH-HLM Goal: 6: Walk 10 steps or more  JH-HLM Achieved: 6: Walk 10 steps or more  HLM Goal NOT achieved. Continue with multidisciplinary rounding and encourage appropriate mobility to improve upon HLM goals.    Patient Centered Rounds: I performed bedside rounds with nursing staff today.   Discussions with Specialists or Other Care Team Provider: CM     Education and Discussions with Family / Patient:  patient was updated.     Total Time  Spent on Date of Encounter in care of patient: 21 mins. This time was spent on one or more of the following: performing physical exam; counseling and coordination of care; obtaining or reviewing history; documenting in the medical record; reviewing/ordering tests, medications or procedures; communicating with other healthcare professionals and discussing with patient's family/caregivers.    Current Length of Stay: 26 day(s)  Current Patient Status: Inpatient   Certification Statement: The patient will continue to require additional inpatient hospital stay due to ambulatory gait dysfunction  Discharge Plan: Anticipate discharge in >72 hrs to rehab facility.CM is in the process of securing a facility for discharge- hopeful discharge in a week.      Code Status: Level 1 - Full Code    Subjective:   The patient was seen and examined.  No acute event overnight.     Objective:     Vitals:   Temp (24hrs), Av.3 °F (36.3 °C), Min:96.4 °F (35.8 °C), Max:98.1 °F (36.7 °C)    Temp:  [96.4 °F (35.8 °C)-98.1 °F (36.7 °C)] 96.4 °F (35.8 °C)  Resp:  [16-17] 16  BP: (146-152)/(70-84) 152/84  Body mass index is 22.66 kg/m².     Input and Output Summary (last 24 hours):     Intake/Output Summary (Last 24 hours) at 3/27/2024 0847  Last data filed at 3/26/2024 1759  Gross per 24 hour   Intake 440 ml   Output --   Net 440 ml           Physical Exam:   Physical Exam  Vitals and nursing note reviewed.   Constitutional:       General: He is not in acute distress.     Appearance: Normal appearance.      Comments: Frail and appearing older than age   HENT:      Head: Normocephalic and atraumatic.      Right Ear: External ear normal.      Left Ear: External ear normal.      Nose: Nose normal.      Mouth/Throat:      Mouth: Mucous membranes are moist.      Pharynx: Oropharynx is clear.   Eyes:      General:         Right eye: No discharge.         Left eye: No discharge.      Extraocular Movements: Extraocular movements intact.      Pupils:  Pupils are equal, round, and reactive to light.   Cardiovascular:      Rate and Rhythm: Normal rate and regular rhythm.      Pulses: Normal pulses.      Heart sounds: Normal heart sounds. No murmur heard.  Pulmonary:      Effort: Pulmonary effort is normal. No respiratory distress.      Breath sounds: Normal breath sounds. No wheezing or rales.   Abdominal:      General: Bowel sounds are normal. There is no distension.      Palpations: Abdomen is soft. There is no mass.      Tenderness: There is no abdominal tenderness.   Musculoskeletal:         General: No swelling, tenderness or deformity. Normal range of motion.      Cervical back: Normal range of motion and neck supple. No rigidity.   Skin:     General: Skin is warm and dry.      Capillary Refill: Capillary refill takes less than 2 seconds.      Coloration: Skin is not pale.      Findings: No erythema.   Neurological:      Mental Status: He is alert. Mental status is at baseline.      Comments: With periods of forgetfulness/confusion. Upper extremities tremors and ataxia. At baseline.     Psychiatric:         Attention and Perception: Attention normal.         Mood and Affect: Affect is flat.         Behavior: Behavior normal. Behavior is cooperative.         Cognition and Memory: Cognition is impaired.       Additional Data:     Labs:                      Results from last 7 days   Lab Units 03/27/24  0704 03/26/24  2055 03/26/24  1557 03/26/24  1111 03/26/24  0736 03/25/24  2126 03/25/24  1607 03/25/24  1039 03/25/24  0647 03/24/24  2106 03/24/24  0748 03/23/24  2055   POC GLUCOSE mg/dl 131 190* 154* 142* 127 177* 142* 145* 142* 122 138 153*               Lines/Drains:  Invasive Devices       None                 Urinary Catheter:  Goal for removal: Voiding trial when ambulation improves               Imaging: Reviewed radiology reports from this admission including: CT head and xray(s)    Recent Cultures (last 7 days):         Last 24 Hours Medication List:    Current Facility-Administered Medications   Medication Dose Route Frequency Provider Last Rate    acetaminophen  650 mg Oral 4x Daily PRN HUNTER Lopez      divalproex sodium  250 mg Oral Daily Lizzie Krause, HUNTER      divalproex sodium  1,000 mg Oral HS Lizzie Krause, HUNTER      divalproex sodium  500 mg Oral Daily Lizzie Krause, HUNTER      docusate sodium  100 mg Oral BID PRN Hasmukh Becker, JAIR      enoxaparin  40 mg Subcutaneous Daily Hasmukh Becker, JAIR      gabapentin  400 mg Oral TID Hasmukh Becker PA-C      ibuprofen  600 mg Oral Q6H PRN Ancelmolillian Mota PA-C      insulin lispro  1-5 Units Subcutaneous TID AC Hasmukh Becker, JAIR      insulin lispro  1-6 Units Subcutaneous HS Hasmukh Becker PA-C      lacosamide  150 mg Oral Daily Hasmukh Becker, JAIR      lacosamide  200 mg Oral HS Hasmukh Becker PA-C      levETIRAcetam  1,500 mg Oral Q12H RONALD Hasmukh Becker PA-C      levothyroxine  150 mcg Oral Daily Hasmukh Becker PA-C      lisinopril  10 mg Oral Daily Hasmukh Becker, JAIR      loratadine  10 mg Oral Daily Hasmukh Becker, JAIR      multivitamin stress formula  1 tablet Oral Daily Hasmukh Becker PA-C      OLANZapine  5 mg Intramuscular Q3H PRN Hasmukh Becker PA-C      pravastatin  80 mg Oral Daily With Dinner Hasmukh Becker PA-C      QUEtiapine  150 mg Oral BID HUNTER Lopez      QUEtiapine  300 mg Oral HS HUNTER Lopez      tamsulosin  0.4 mg Oral Daily With Dinner HUNTER Lopez      temazepam  30 mg Oral HS Ashley Good, HUNTER      zonisamide  300 mg Oral HS Hasmukh Becker PA-C          Today, Patient Was Seen By: HUNTER Lopez    **Please Note: This note may have been constructed using a voice recognition system.**

## 2024-03-27 NOTE — ASSESSMENT & PLAN NOTE
He has been mostly cooperative without any agitation or behavioral issues, however recently became upset and refused some care. His sister at bedside takes this to be frustration from him that he is still in the hospital   Seroquel XR decreased to 150 BID and 300 HS due to excessive sleepiness    Per nursing report, patient has not been sleeping well.  He had Restoril 15 mg as needed which generally has been given. Increased dose to 30 mg nightly as a routine order     Monitor behaviors, assess sleep-wake cycle, monitor for excessive sleepiness

## 2024-03-27 NOTE — PLAN OF CARE
Problem: PAIN - ADULT  Goal: Verbalizes/displays adequate comfort level or baseline comfort level  Description: Interventions:  - Encourage patient to monitor pain and request assistance  - Assess pain using appropriate pain scale  - Administer analgesics based on type and severity of pain and evaluate response  - Implement non-pharmacological measures as appropriate and evaluate response  - Consider cultural and social influences on pain and pain management  - Notify physician/advanced practitioner if interventions unsuccessful or patient reports new pain  Outcome: Progressing     Problem: INFECTION - ADULT  Goal: Absence or prevention of progression during hospitalization  Description: INTERVENTIONS:  - Assess and monitor for signs and symptoms of infection  - Monitor lab/diagnostic results  - Monitor all insertion sites, i.e. indwelling lines, tubes, and drains  - Monitor endotracheal if appropriate and nasal secretions for changes in amount and color  - [place] appropriate cooling/warming therapies per order  - Administer medications as ordered  - Instruct and encourage patient and family to use good hand hygiene technique  - Identify and instruct in appropriate isolation precautions for identified infection/condition  Outcome: Progressing  Goal: Absence of fever/infection during neutropenic period  Description: INTERVENTIONS:  - Monitor WBC    Outcome: Progressing     Problem: SAFETY ADULT  Goal: Patient will remain free of falls  Description: INTERVENTIONS:  - Educate patient/family on patient safety including physical limitations  - Instruct patient to call for assistance with activity   - Consult OT/PT to assist with strengthening/mobility   - Keep Call bell within reach  - Keep bed low and locked with side rails adjusted as appropriate  - Keep care items and personal belongings within reach  - Initiate and maintain comfort rounds  - Make Fall Risk Sign visible to staff  - Offer Toileting every 2 Hours,  in advance of need  - Initiate/Maintain bed alarm  - Obtain necessary fall risk management equipment: walker   - Apply yellow socks and bracelet for high fall risk patients  - Consider moving patient to room near nurses station  Outcome: Progressing  Goal: Maintain or return to baseline ADL function  Description: INTERVENTIONS:  -  Assess patient's ability to carry out ADLs; assess patient's baseline for ADL function and identify physical deficits which impact ability to perform ADLs (bathing, care of mouth/teeth, toileting, grooming, dressing, etc.)  - Assess/evaluate cause of self-care deficits   - Assess range of motion  - Assess patient's mobility; develop plan if impaired  - Assess patient's need for assistive devices and provide as appropriate  - Encourage maximum independence but intervene and supervise when necessary  - Involve family in performance of ADLs  - Assess for home care needs following discharge   - Consider OT consult to assist with ADL evaluation and planning for discharge  - Provide patient education as appropriate  Outcome: Progressing  Goal: Maintains/Returns to pre admission functional level  Description: INTERVENTIONS:  - Perform AM-PAC 6 Click Basic Mobility/ Daily Activity assessment daily.  - Set and communicate daily mobility goal to care team and patient/family/caregiver.   - Collaborate with rehabilitation services on mobility goals if consulted  - Perform Range of Motion 3 times a day.  - Reposition patient every 2 hours.  - Dangle patient 3 times a day  - Stand patient 3 times a day  - Ambulate patient 3 times a day  - Out of bed to chair 3 times a day   - Out of bed for meals 3 times a day  - Out of bed for toileting  - Record patient progress and toleration of activity level   Outcome: Progressing     Problem: DISCHARGE PLANNING  Goal: Discharge to home or other facility with appropriate resources  Description: INTERVENTIONS:  - Identify barriers to discharge w/patient and  caregiver  - Arrange for needed discharge resources and transportation as appropriate  - Identify discharge learning needs (meds, wound care, etc.)  - Arrange for interpretive services to assist at discharge as needed  - Refer to Case Management Department for coordinating discharge planning if the patient needs post-hospital services based on physician/advanced practitioner order or complex needs related to functional status, cognitive ability, or social support system  Outcome: Progressing     Problem: Knowledge Deficit  Goal: Patient/family/caregiver demonstrates understanding of disease process, treatment plan, medications, and discharge instructions  Description: Complete learning assessment and assess knowledge base.  Interventions:  - Provide teaching at level of understanding  - Provide teaching via preferred learning methods  Outcome: Progressing     Problem: Prexisting or High Potential for Compromised Skin Integrity  Goal: Skin integrity is maintained or improved  Description: INTERVENTIONS:  - Identify patients at risk for skin breakdown  - Assess and monitor skin integrity  - Assess and monitor nutrition and hydration status  - Monitor labs   - Assess for incontinence   - Turn and reposition patient  - Assist with mobility/ambulation  - Relieve pressure over bony prominences  - Avoid friction and shearing  - Provide appropriate hygiene as needed including keeping skin clean and dry  - Evaluate need for skin moisturizer/barrier cream  - Collaborate with interdisciplinary team   - Patient/family teaching  - Consider wound care consult   Outcome: Progressing

## 2024-03-27 NOTE — ASSESSMENT & PLAN NOTE
Lab Results   Component Value Date    HGBA1C 5.5 11/27/2023       Recent Labs     03/27/24  1105 03/27/24  1605 03/27/24 2041 03/28/24  0709   POCGLU 160* 152* 190* 134       Blood Sugar Average: Last 72 hrs:  (P) [id number]    Continue diabetic diet         Continue fingerstick blood sugar with sliding scale coverage  Hypoglycemia protocol

## 2024-03-27 NOTE — CASE MANAGEMENT
Case Management Discharge Planning Note    Patient name Jairon Oconnell  Location /-01 MRN [id number]  : 1965 Date 3/27/2024       Current Admission Date: 3/1/2024  Current Admission Diagnosis:Ambulatory dysfunction   Patient Active Problem List    Diagnosis Date Noted    Urinary retention 2024    Nondisplaced fracture of greater trochanter of right femur, subsequent encounter for closed fracture with routine healing 2024    Closed nondisplaced fracture of proximal phalanx of left index finger with routine healing, subsequent encounter 10/30/2023    Pressure ulcer of sacral region, stage 3 (HCC) 2023    Ambulatory dysfunction 2022    Social problem 2021    Hypomagnesemia 2021    Thrombocytopathia (Formerly [facility]) 2020    Hyponatremia 2019    Metabolic encephalopathy 2019    Seborrheic dermatitis of scalp 2019    Nearsightedness 2019    Behavior concern in adult 2019    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HCC) 2019    Vitamin B12 deficiency 2017    Mixed hyperlipidemia 2016    Vitamin D deficiency 2016    Type 2 diabetes mellitus with diabetic neuropathy, without long-term current use of insulin (Formerly [facility]) 06/15/2016    Acquired hypothyroidism 06/15/2016    Cataract 2013    Ataxic cerebral palsy (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[facility]) 2013    Generalized convulsive epilepsy ([facility]) 2013    Essential hypertension 2013    Osteoporosis 2013    Scoliosis 2013      LOS (days): 26  Geometric Mean LOS (GMLOS) (days): 3.9  Days to GMLOS:-22.7     OBJECTIVE:  Risk of Unplanned Readmission Score: 28.15         Current admission status: Inpatient   Preferred Pharmacy:   [facility] - [place] - [address]  [address] [address]  Phone: [phone] Fax: [phone]    [id number] [place] [facility] [address] [address]  Phone:  [phone] Fax: [phone]    Primary Care Provider: HUNTER Brown    Primary Insurance: MEDICARE  Secondary Insurance: [place] [employer]    DISCHARGE DETAILS:    Discharge planning discussed with:: Reid ([facility] cm) was called at 14:12pm [facility] Lo( ) called 14;10pm [facility] & Kemi was called at 14:13pm  Freedom of Choice: Yes  Comments - Freedom of Choice: moderate level -ll recommended- cm was not able to find an accepting facility- pt will be going to a new [facility] sharing home[facility] 3/30/2024- cm was asked to set up hhc - referrals were sent as rquested-  CM contacted family/caregiver?: Yes             Contacts  Patient Contacts: Madeline Contreras 14:13pm  Contact Method: Phone  Phone Number: [phone] Madeline - [phone] Lo    [phone] Kemi  Reason/Outcome: Discharge Planning    Requested Home Health Care         Is the patient interested in HHC at discharge?: Yes         Other Referral/Resources/Interventions Provided:  Interventions: HHC  Referral Comments: cm went to review hhc choices robyn Mcmullen and I was told that Madeline from [facility] needs to chose the hhc agency    Would you like to participate in our [facility] service program?  : No - Declined    Treatment Team Recommendation: Home with Home Health Care (home to  life sharing home with hhc - tbd)

## 2024-03-28 LAB
DME PARACHUTE DELIVERY DATE ACTUAL: NORMAL
DME PARACHUTE DELIVERY DATE REQUESTED: NORMAL
DME PARACHUTE ITEM DESCRIPTION: NORMAL
DME PARACHUTE ORDER STATUS: NORMAL
DME PARACHUTE SUPPLIER NAME: NORMAL
DME PARACHUTE SUPPLIER PHONE: NORMAL
GLUCOSE SERPL-MCNC: 134 MG/DL (ref 65–140)
GLUCOSE SERPL-MCNC: 150 MG/DL (ref 65–140)
GLUCOSE SERPL-MCNC: 173 MG/DL (ref 65–140)
GLUCOSE SERPL-MCNC: 198 MG/DL (ref 65–140)

## 2024-03-28 PROCEDURE — 99231 SBSQ HOSP IP/OBS SF/LOW 25: CPT | Performed by: NURSE PRACTITIONER

## 2024-03-28 PROCEDURE — 82948 REAGENT STRIP/BLOOD GLUCOSE: CPT

## 2024-03-28 RX ADMIN — ENOXAPARIN SODIUM 40 MG: 40 INJECTION SUBCUTANEOUS at 08:59

## 2024-03-28 RX ADMIN — DIVALPROEX SODIUM 250 MG: 250 TABLET, FILM COATED, EXTENDED RELEASE ORAL at 08:50

## 2024-03-28 RX ADMIN — INSULIN LISPRO 1 UNITS: 100 INJECTION, SOLUTION INTRAVENOUS; SUBCUTANEOUS at 22:11

## 2024-03-28 RX ADMIN — INSULIN LISPRO 1 UNITS: 100 INJECTION, SOLUTION INTRAVENOUS; SUBCUTANEOUS at 16:53

## 2024-03-28 RX ADMIN — LACOSAMIDE 150 MG: 150 TABLET ORAL at 08:49

## 2024-03-28 RX ADMIN — DIVALPROEX SODIUM 500 MG: 500 TABLET, DELAYED RELEASE ORAL at 08:50

## 2024-03-28 RX ADMIN — TEMAZEPAM 30 MG: 7.5 CAPSULE ORAL at 22:01

## 2024-03-28 RX ADMIN — GABAPENTIN 400 MG: 400 CAPSULE ORAL at 15:38

## 2024-03-28 RX ADMIN — LISINOPRIL 10 MG: 10 TABLET ORAL at 08:49

## 2024-03-28 RX ADMIN — QUETIAPINE FUMARATE 150 MG: 150 TABLET, EXTENDED RELEASE ORAL at 15:39

## 2024-03-28 RX ADMIN — DIVALPROEX SODIUM 1000 MG: 500 TABLET, DELAYED RELEASE ORAL at 21:58

## 2024-03-28 RX ADMIN — LORATADINE 10 MG: 10 TABLET ORAL at 08:49

## 2024-03-28 RX ADMIN — B-COMPLEX W/ C & FOLIC ACID TAB 1 TABLET: TAB at 08:49

## 2024-03-28 RX ADMIN — GABAPENTIN 400 MG: 400 CAPSULE ORAL at 08:49

## 2024-03-28 RX ADMIN — PRAVASTATIN SODIUM 80 MG: 40 TABLET ORAL at 15:38

## 2024-03-28 RX ADMIN — LEVETIRACETAM 1500 MG: 500 TABLET, FILM COATED ORAL at 08:48

## 2024-03-28 RX ADMIN — LEVETIRACETAM 1500 MG: 500 TABLET, FILM COATED ORAL at 22:04

## 2024-03-28 RX ADMIN — QUETIAPINE FUMARATE 150 MG: 150 TABLET, EXTENDED RELEASE ORAL at 08:49

## 2024-03-28 RX ADMIN — QUETIAPINE FUMARATE 300 MG: 150 TABLET, EXTENDED RELEASE ORAL at 21:59

## 2024-03-28 RX ADMIN — GABAPENTIN 400 MG: 400 CAPSULE ORAL at 22:03

## 2024-03-28 RX ADMIN — LACOSAMIDE 200 MG: 150 TABLET ORAL at 22:03

## 2024-03-28 RX ADMIN — ZONISAMIDE 300 MG: 100 CAPSULE ORAL at 22:18

## 2024-03-28 RX ADMIN — LEVOTHYROXINE SODIUM 150 MCG: 75 TABLET ORAL at 08:49

## 2024-03-28 RX ADMIN — TAMSULOSIN HYDROCHLORIDE 0.4 MG: 0.4 CAPSULE ORAL at 15:38

## 2024-03-28 NOTE — PROGRESS NOTES
[facility]  Progress Note  Name: Jairon Oconnell I  MRN: [id number]  Unit/Bed#: -01 I Date of Admission: 3/1/2024   Date of Service: 3/28/2024 I Hospital Day: 27    Assessment/Plan   * Ambulatory dysfunction  Assessment & Plan  With non-operative right hip greater trochanter fracture seen and evaluated by orthopedic surgery on the previous admission  He was discharged to a skilled nursing facility, became agitated, broke a chair, and was reported to become aggressive with staff.  The facility will now not take him back  Psychiatry recommendations appreciated. No indication for inpatient psychiatric hospitalization at this time.  Recommendation is rehab/SNF  PT/OT recommendations appreciated.  Medically clear for discharge to rehab facility   Case management following, input appreciated. Patient to be possibly discharged to a new life sharing home.            Urinary retention  Assessment & Plan  S/p chapman catheter, since discontinued   Continue tamsulosin  Continue urinary retention protocol                 Pressure ulcer of sacral region, stage 3 (HCC)  Assessment & Plan  POA  Wound care recommendations appreciated: Scarring to the left lateral buttock from stage 3 pressure injury in the past. Scarring is intact and blanchable and hyperpigmented   Turn and reposition every 2 hours, frequent offloading                  Behavior concern in adult  Assessment & Plan  He has been mostly cooperative without any agitation or behavioral issues, however recently became upset and refused some care. His sister at bedside takes this to be frustration from him that he is still in the hospital   Seroquel XR decreased to 150 BID and 300 HS due to excessive sleepiness    Per nursing report, patient has not been sleeping well.  He had Restoril 15 mg as needed which generally has been given. Increased dose to 30 mg nightly as a routine order     Monitor behaviors, assess sleep-wake cycle, monitor for  excessive sleepiness           Acquired hypothyroidism  Assessment & Plan  Continue levothyroxine 150 mcg PO daily                Essential hypertension  Assessment & Plan  Continue lisinopril 10 mg daily  Monitor blood pressure                    Mixed hyperlipidemia  Assessment & Plan  Continue Pravachol 80 mg oral daily at bedtime                  Generalized convulsive epilepsy (HCC)  Assessment & Plan  Continue Depakote  mg daily, Depakote  mg daily, Depakote DR 1000 mg HS (per  must be brand name) Neurontin 400 mg 3 times daily, Vimpat 150 mg daily and 200 mg HS, zonisamide 300 mg HS, and Keppra 1500 mg every 12 hours.    Continue seizure precautions                       Type 2 diabetes mellitus with diabetic neuropathy, without long-term current use of insulin (HCC)  Assessment & Plan  Lab Results   Component Value Date    HGBA1C 5.5 11/27/2023       Recent Labs     03/27/24  1105 03/27/24  1605 03/27/24  2041 03/28/24  0709   POCGLU 160* 152* 190* 134       Blood Sugar Average: Last 72 hrs:  (P) 152.2490361715046036    Continue diabetic diet         Continue fingerstick blood sugar with sliding scale coverage  Hypoglycemia protocol         Ataxic cerebral palsy (HCC)  Assessment & Plan  Continue supportive care    Continue current medications                           VTE Pharmacologic Prophylaxis: VTE Score: 3 Moderate Risk (Score 3-4) - Pharmacological DVT Prophylaxis Ordered: enoxaparin (Lovenox).    Mobility:   Basic Mobility Inpatient Raw Score: 19  JH-HLM Goal: 6: Walk 10 steps or more  JH-HLM Achieved: 6: Walk 10 steps or more  HLM Goal NOT achieved. Continue with multidisciplinary rounding and encourage appropriate mobility to improve upon HLM goals.    Patient Centered Rounds: I performed bedside rounds with nursing staff today.   Discussions with Specialists or Other Care Team Provider: CM     Education and Discussions with Family / Patient:  patient was updated.      Total Time Spent on Date of Encounter in care of patient: 20 mins. This time was spent on one or more of the following: performing physical exam; counseling and coordination of care; obtaining or reviewing history; documenting in the medical record; reviewing/ordering tests, medications or procedures; communicating with other healthcare professionals and discussing with patient's family/caregivers.    Current Length of Stay: 27 day(s)  Current Patient Status: Inpatient   Certification Statement: The patient will continue to require additional inpatient hospital stay due to ambulatory gait dysfunction  Discharge Plan: Anticipate discharge in >72 hrs to rehab facility.CM is in the process of securing a facility for discharge- hopeful discharge in a week.      Code Status: Level 1 - Full Code    Subjective:   The patient was seen and examined.  No acute event overnight. He denies any pain. Appetite is good per staff.      Objective:     Vitals:   Temp (24hrs), Av.4 °F (36.3 °C), Min:96.8 °F (36 °C), Max:97.9 °F (36.6 °C)    Temp:  [96.8 °F (36 °C)-97.9 °F (36.6 °C)] 96.8 °F (36 °C)  Resp:  [18] 18  BP: (128-145)/(68-80) 128/74  SpO2:  [97 %] 97 %  Body mass index is 22.81 kg/m².     Input and Output Summary (last 24 hours):     Intake/Output Summary (Last 24 hours) at 3/28/2024 0950  Last data filed at 3/28/2024 0842  Gross per 24 hour   Intake 360 ml   Output --   Net 360 ml           Physical Exam:   Physical Exam  Vitals and nursing note reviewed.   Constitutional:       General: He is not in acute distress.     Appearance: Normal appearance.      Comments: Frail and appearing older than age   HENT:      Head: Normocephalic and atraumatic.      Right Ear: External ear normal.      Left Ear: External ear normal.      Nose: Nose normal.      Mouth/Throat:      Mouth: Mucous membranes are moist.      Pharynx: Oropharynx is clear.   Eyes:      General:         Right eye: No discharge.         Left eye: No  discharge.      Extraocular Movements: Extraocular movements intact.      Pupils: Pupils are equal, round, and reactive to light.   Cardiovascular:      Rate and Rhythm: Normal rate and regular rhythm.      Pulses: Normal pulses.      Heart sounds: Normal heart sounds. No murmur heard.  Pulmonary:      Effort: Pulmonary effort is normal. No respiratory distress.      Breath sounds: Normal breath sounds. No wheezing or rales.   Abdominal:      General: Bowel sounds are normal. There is no distension.      Palpations: Abdomen is soft. There is no mass.      Tenderness: There is no abdominal tenderness.   Musculoskeletal:         General: No swelling, tenderness or deformity. Normal range of motion.      Cervical back: Normal range of motion and neck supple. No rigidity.   Skin:     General: Skin is warm and dry.      Capillary Refill: Capillary refill takes less than 2 seconds.      Coloration: Skin is not pale.      Findings: No erythema.   Neurological:      Mental Status: He is alert. Mental status is at baseline.      Comments: With periods of forgetfulness/confusion. Upper extremities tremors and ataxia. At baseline.     Psychiatric:         Attention and Perception: Attention normal.         Mood and Affect: Affect is flat.         Behavior: Behavior normal. Behavior is cooperative.         Cognition and Memory: Cognition is impaired.       Additional Data:     Labs:                      Results from last 7 days   Lab Units 03/28/24  0709 03/27/24  2041 03/27/24  1605 03/27/24  1105 03/27/24  0704 03/26/24  2055 03/26/24  1557 03/26/24  1111 03/26/24  0736 03/25/24  2126 03/25/24  1607 03/25/24  1039   POC GLUCOSE mg/dl 134 190* 152* 160* 131 190* 154* 142* 127 177* 142* 145*               Lines/Drains:  Invasive Devices       None                            Imaging: Reviewed radiology reports from this admission including: CT head and xray(s)    Recent Cultures (last 7 days):         Last 24 Hours Medication  List:   Current Facility-Administered Medications   Medication Dose Route Frequency Provider Last Rate    acetaminophen  650 mg Oral 4x Daily PRN HUNTER Lopez      divalproex sodium  250 mg Oral Daily Lizzie Krause, HUNTER      divalproex sodium  1,000 mg Oral HS Lizzie Krause, HUNTER      divalproex sodium  500 mg Oral Daily HUNTER Lopez      docusate sodium  100 mg Oral BID PRN Hasmukh Becker, JAIR      enoxaparin  40 mg Subcutaneous Daily Hasmukh Becker, JAIR      gabapentin  400 mg Oral TID Hasmukh Becker, JAIR      ibuprofen  600 mg Oral Q6H PRN Ancelmo Chito Mota PA-C      insulin lispro  1-5 Units Subcutaneous TID AC Hasmukh Becker, JAIR      insulin lispro  1-6 Units Subcutaneous HS Hasmukh Becker, JAIR      lacosamide  150 mg Oral Daily Hasmukh Becker, JAIR      lacosamide  200 mg Oral HS Hasmukh Becker, JAIR      levETIRAcetam  1,500 mg Oral Q12H RONALD Hasmukh Becker PA-C      levothyroxine  150 mcg Oral Daily Hasmukh Becker, JAIR      lisinopril  10 mg Oral Daily Hasmukh Becker, JAIR      loratadine  10 mg Oral Daily Hasmukh Becker, JAIR      multivitamin stress formula  1 tablet Oral Daily Hasmukh Becker PA-C      OLANZapine  5 mg Intramuscular Q3H PRN Hasmukh Becker PA-C      pravastatin  80 mg Oral Daily With Dinner Hasmukh Becker PA-C      QUEtiapine  150 mg Oral BID HUNTER Lopez      QUEtiapine  300 mg Oral HS HUNTER Lopez      tamsulosin  0.4 mg Oral Daily With Dinner HUNTER Lopez      temazepam  30 mg Oral HS Ashley Good, HUNTER      zonisamide  300 mg Oral HS Hasmukh Becker PA-C          Today, Patient Was Seen By: HUNTER Lopez    **Please Note: This note may have been constructed using a voice recognition system.**

## 2024-03-29 LAB
GLUCOSE SERPL-MCNC: 140 MG/DL (ref 65–140)
GLUCOSE SERPL-MCNC: 161 MG/DL (ref 65–140)
GLUCOSE SERPL-MCNC: 174 MG/DL (ref 65–140)
GLUCOSE SERPL-MCNC: 220 MG/DL (ref 65–140)

## 2024-03-29 PROCEDURE — 82948 REAGENT STRIP/BLOOD GLUCOSE: CPT

## 2024-03-29 PROCEDURE — 99239 HOSP IP/OBS DSCHRG MGMT >30: CPT | Performed by: INTERNAL MEDICINE

## 2024-03-29 RX ADMIN — TAMSULOSIN HYDROCHLORIDE 0.4 MG: 0.4 CAPSULE ORAL at 15:57

## 2024-03-29 RX ADMIN — PRAVASTATIN SODIUM 80 MG: 40 TABLET ORAL at 15:57

## 2024-03-29 RX ADMIN — QUETIAPINE FUMARATE 150 MG: 150 TABLET, EXTENDED RELEASE ORAL at 08:06

## 2024-03-29 RX ADMIN — ZONISAMIDE 300 MG: 100 CAPSULE ORAL at 22:20

## 2024-03-29 RX ADMIN — LORATADINE 10 MG: 10 TABLET ORAL at 08:06

## 2024-03-29 RX ADMIN — LEVETIRACETAM 1500 MG: 500 TABLET, FILM COATED ORAL at 08:06

## 2024-03-29 RX ADMIN — INSULIN LISPRO 1 UNITS: 100 INJECTION, SOLUTION INTRAVENOUS; SUBCUTANEOUS at 07:39

## 2024-03-29 RX ADMIN — B-COMPLEX W/ C & FOLIC ACID TAB 1 TABLET: TAB at 08:06

## 2024-03-29 RX ADMIN — DIVALPROEX SODIUM 500 MG: 500 TABLET, DELAYED RELEASE ORAL at 08:07

## 2024-03-29 RX ADMIN — GABAPENTIN 400 MG: 400 CAPSULE ORAL at 22:17

## 2024-03-29 RX ADMIN — LEVOTHYROXINE SODIUM 150 MCG: 75 TABLET ORAL at 08:06

## 2024-03-29 RX ADMIN — GABAPENTIN 400 MG: 400 CAPSULE ORAL at 08:06

## 2024-03-29 RX ADMIN — GABAPENTIN 400 MG: 400 CAPSULE ORAL at 15:57

## 2024-03-29 RX ADMIN — TEMAZEPAM 30 MG: 7.5 CAPSULE ORAL at 22:23

## 2024-03-29 RX ADMIN — DIVALPROEX SODIUM 1000 MG: 500 TABLET, DELAYED RELEASE ORAL at 22:24

## 2024-03-29 RX ADMIN — INSULIN LISPRO 1 UNITS: 100 INJECTION, SOLUTION INTRAVENOUS; SUBCUTANEOUS at 16:40

## 2024-03-29 RX ADMIN — DIVALPROEX SODIUM 250 MG: 250 TABLET, FILM COATED, EXTENDED RELEASE ORAL at 08:07

## 2024-03-29 RX ADMIN — LEVETIRACETAM 1500 MG: 500 TABLET, FILM COATED ORAL at 22:22

## 2024-03-29 RX ADMIN — ENOXAPARIN SODIUM 40 MG: 40 INJECTION SUBCUTANEOUS at 08:06

## 2024-03-29 RX ADMIN — QUETIAPINE FUMARATE 300 MG: 150 TABLET, EXTENDED RELEASE ORAL at 22:19

## 2024-03-29 RX ADMIN — LACOSAMIDE 150 MG: 150 TABLET ORAL at 08:06

## 2024-03-29 RX ADMIN — ACETAMINOPHEN 650 MG: 325 TABLET ORAL at 19:13

## 2024-03-29 RX ADMIN — LISINOPRIL 10 MG: 10 TABLET ORAL at 08:06

## 2024-03-29 RX ADMIN — INSULIN LISPRO 2 UNITS: 100 INJECTION, SOLUTION INTRAVENOUS; SUBCUTANEOUS at 11:52

## 2024-03-29 RX ADMIN — LACOSAMIDE 200 MG: 150 TABLET ORAL at 22:21

## 2024-03-29 NOTE — DISCHARGE SUMMARY
[facility]  Discharge- aJiron Oconnell 1965, 58 y.o. male MRN: [id number]  Unit/Bed#: MS [facility] Encounter: [id number]  Primary Care Provider: HUNTER Brown   Date and time admitted to hospital: 3/1/2024  2:13 AM    Urinary retention  Assessment & Plan  S/p chapman catheter, since discontinued   Continue tamsulosin  Continue urinary retention protocol                 Pressure ulcer of sacral region, stage 3 (HCC)  Assessment & Plan  POA  Wound care recommendations appreciated: Scarring to the left lateral buttock from stage 3 pressure injury in the past. Scarring is intact and blanchable and hyperpigmented   Turn and reposition every 2 hours, frequent offloading                  Behavior concern in adult  Assessment & Plan  He has been mostly cooperative without any agitation or behavioral issues, however recently became upset and refused some care. His sister at bedside takes this to be frustration from him that he is still in the hospital   Seroquel XR decreased to 150 BID and 300 HS due to excessive sleepiness    Per nursing report, patient has not been sleeping well.  He had Restoril 15 mg as needed which generally has been given. Increased dose to 30 mg nightly as a routine order     Monitor behaviors, assess sleep-wake cycle, monitor for excessive sleepiness           Acquired hypothyroidism  Assessment & Plan  Continue levothyroxine 150 mcg PO daily                Essential hypertension  Assessment & Plan  Continue lisinopril 10 mg daily  Monitor blood pressure                    Mixed hyperlipidemia  Assessment & Plan  Continue Pravachol 80 mg oral daily at bedtime                  Generalized convulsive epilepsy (HCC)  Assessment & Plan  Continue Depakote  mg daily, Depakote  mg daily, Depakote DR 1000 mg HS (per  must be brand name) Neurontin 400 mg 3 times daily, Vimpat 150 mg daily and 200 mg HS, zonisamide 300 mg HS, and Keppra 1500 mg  every 12 hours.    Continue seizure precautions                       Type 2 diabetes mellitus with diabetic neuropathy, without long-term current use of insulin (HCC)  Assessment & Plan  Lab Results   Component Value Date    HGBA1C 5.5 11/27/2023       Recent Labs     03/27/24  1105 03/27/24  1605 03/27/24  2041 03/28/24  0709   POCGLU 160* 152* 190* 134       Blood Sugar Average: Last 72 hrs:  (P) [id number]    Continue diabetic diet         Continue fingerstick blood sugar with sliding scale coverage  Hypoglycemia protocol         Ataxic cerebral palsy (HCC)  Assessment & Plan  Continue supportive care    Continue current medications                * Ambulatory dysfunction  Assessment & Plan  With non-operative right hip greater trochanter fracture seen and evaluated by orthopedic surgery on the previous admission  He was discharged to a skilled nursing facility, became agitated, broke a chair, and was reported to become aggressive with staff.  The facility will now not take him back  Psychiatry recommendations appreciated. No indication for inpatient psychiatric hospitalization at this time.  Recommendation is rehab/SNF  PT/OT recommendations appreciated.  Medically clear for discharge to rehab facility   Case management following, input appreciated. Patient to be possibly discharged to a new life sharing home.                VTE Pharmacologic Prophylaxis: VTE Score: 3 Moderate Risk (Score 3-4) - Pharmacological DVT Prophylaxis Ordered: enoxaparin (Lovenox).    Mobility:   Basic Mobility Inpatient Raw Score: 19  JH-HLM Goal: 6: Walk 10 steps or more  JH-HLM Achieved: 6: Walk 10 steps or more  JH-HLM Goal achieved. Continue to encourage appropriate mobility.    Patient Centered Rounds: I performed bedside rounds with nursing staff today.   Discussions with Specialists or Other Care Team Provider: yes    Education and Discussions with Family / Patient:  updated patient regarding plan of care.     Current  Length of Stay: 28 day(s)  Current Patient Status: Inpatient   Certification Statement: The patient will continue to require additional inpatient hospital stay due to pending placement  Discharge Plan: Anticipate discharge tomorrow to rehab facility.    Code Status: Level 1 - Full Code    Subjective:   No overnight events noted    Objective:     Vitals:   Temp (24hrs), Av.8 °F (36.6 °C), Min:97.6 °F (36.4 °C), Max:98 °F (36.7 °C)    Temp:  [97.6 °F (36.4 °C)-98 °F (36.7 °C)] 98 °F (36.7 °C)  HR:  [86-88] 86  Resp:  [17-20] 18  BP: (110-137)/(55-78) 123/73  SpO2:  [96 %] 96 %  Body mass index is 23.05 kg/m².     Input and Output Summary (last 24 hours):     Intake/Output Summary (Last 24 hours) at 3/29/2024 1007  Last data filed at 3/29/2024 0900  Gross per 24 hour   Intake 240 ml   Output --   Net 240 ml       Physical Exam:   Physical Exam  Constitutional:       General: He is not in acute distress.  HENT:      Head: Normocephalic and atraumatic.      Nose: Nose normal.      Mouth/Throat:      Mouth: Mucous membranes are moist.   Eyes:      Extraocular Movements: Extraocular movements intact.      Conjunctiva/sclera: Conjunctivae normal.   Cardiovascular:      Rate and Rhythm: Normal rate and regular rhythm.   Pulmonary:      Effort: Pulmonary effort is normal. No respiratory distress.   Abdominal:      Palpations: Abdomen is soft.      Tenderness: There is no abdominal tenderness.   Musculoskeletal:         General: Normal range of motion.      Cervical back: Normal range of motion and neck supple.   Skin:     General: Skin is warm and dry.   Neurological:      General: No focal deficit present.      Mental Status: He is alert. Mental status is at baseline.   Psychiatric:         Mood and Affect: Affect is flat.         Cognition and Memory: Cognition is impaired.          Additional Data:     Labs:              Results from last 7 days   Lab Units 24  0647 24  2048 24  1607 24  1115  03/28/24  0709 03/27/24  2041 03/27/24  1605 03/27/24  1105 03/27/24  0704 03/26/24  2055 03/26/24  1557 03/26/24  1111   POC GLUCOSE mg/dl 174* 150* 198* 173* 134 190* 152* 160* 131 190* 154* 142*               Lines/Drains:  Invasive Devices       None                         Imaging: No pertinent imaging reviewed.    Recent Cultures (last 7 days):         Last 24 Hours Medication List:   Current Facility-Administered Medications   Medication Dose Route Frequency Provider Last Rate    acetaminophen  650 mg Oral 4x Daily PRN Lizzie Jimi, CRNP      divalproex sodium  250 mg Oral Daily Lizzie Jimi, CRNP      divalproex sodium  1,000 mg Oral HS Lizzie Jimi, CRNP      divalproex sodium  500 mg Oral Daily Lizzie Jimi, CRNP      docusate sodium  100 mg Oral BID PRN Hasmukh Becker PA-C      enoxaparin  40 mg Subcutaneous Daily Hasmukh Becker PA-C      gabapentin  400 mg Oral TID Hasmukh Becker PA-C      ibuprofen  600 mg Oral Q6H PRN Ancelmo Mota PA-C      insulin lispro  1-5 Units Subcutaneous TID AC Hasmukh Becker, JAIR      insulin lispro  1-6 Units Subcutaneous HS Hasmukh Becker PA-C      lacosamide  150 mg Oral Daily Hasmukh Becker, JAIR      lacosamide  200 mg Oral HS Hasmukh Becker PA-C      levETIRAcetam  1,500 mg Oral Q12H [facility] Hasmukh Becker PA-C      levothyroxine  150 mcg Oral Daily Hasmukh Becker PA-C      lisinopril  10 mg Oral Daily Hasmukh Becker, PA-C      loratadine  10 mg Oral Daily STERLING Fofana-DENIA      multivitamin stress formula  1 tablet Oral Daily Hasmukh Becker PA-C      OLANZapine  5 mg Intramuscular Q3H PRN Hasmukh Becker PA-C      pravastatin  80 mg Oral Daily With Dinner Hasmukh Becker, PA-C      QUEtiapine  150 mg Oral BID HUNTER Lopez      QUEtiapine  300 mg Oral HS HUNTER Lopez      tamsulosin  0.4 mg Oral Daily With Dinner HUNTER Lopez      temazepam  30 mg Oral HS HUNTER Main      zonisamide  300 mg Oral HS Hasmukh Becker PA-C           Today, Patient Was Seen By: Amador Diaz MD    **Please Note: This note may have been constructed using a voice recognition system.**

## 2024-03-29 NOTE — PLAN OF CARE
Problem: PAIN - ADULT  Goal: Verbalizes/displays adequate comfort level or baseline comfort level  Description: Interventions:  - Encourage patient to monitor pain and request assistance  - Assess pain using appropriate pain scale  - Administer analgesics based on type and severity of pain and evaluate response  - Implement non-pharmacological measures as appropriate and evaluate response  - Consider cultural and social influences on pain and pain management  - Notify physician/advanced practitioner if interventions unsuccessful or patient reports new pain  Outcome: Progressing     Problem: INFECTION - ADULT  Goal: Absence or prevention of progression during hospitalization  Description: INTERVENTIONS:  - Assess and monitor for signs and symptoms of infection  - Monitor lab/diagnostic results  - Monitor all insertion sites, i.e. indwelling lines, tubes, and drains  - Monitor endotracheal if appropriate and nasal secretions for changes in amount and color  - [place] appropriate cooling/warming therapies per order  - Administer medications as ordered  - Instruct and encourage patient and family to use good hand hygiene technique  - Identify and instruct in appropriate isolation precautions for identified infection/condition  Outcome: Progressing     Problem: SAFETY ADULT  Goal: Patient will remain free of falls  Description: INTERVENTIONS:  - Educate patient/family on patient safety including physical limitations  - Instruct patient to call for assistance with activity   - Consult OT/PT to assist with strengthening/mobility   - Keep Call bell within reach  - Keep bed low and locked with side rails adjusted as appropriate  - Keep care items and personal belongings within reach  - Initiate and maintain comfort rounds  - Make Fall Risk Sign visible to staff  - Offer Toileting every 2 Hours, in advance of need  - Initiate/Maintain bed alarm  - Obtain necessary fall risk management equipment: bed alarm, socks  Problem:  DISCHARGE PLANNING  Goal: Discharge to home or other facility with appropriate resources  Description: INTERVENTIONS:  - Identify barriers to discharge w/patient and caregiver  - Arrange for needed discharge resources and transportation as appropriate  - Identify discharge learning needs (meds, wound care, etc.)  - Arrange for interpretive services to assist at discharge as needed  - Refer to Case Management Department for coordinating discharge planning if the patient needs post-hospital services based on physician/advanced practitioner order or complex needs related to functional status, cognitive ability, or social support system  Outcome: Progressing     Problem: Prexisting or High Potential for Compromised Skin Integrity  Goal: Skin integrity is maintained or improved  Description: INTERVENTIONS:  - Identify patients at risk for skin breakdown  - Assess and monitor skin integrity  - Assess and monitor nutrition and hydration status  - Monitor labs   - Assess for incontinence   - Turn and reposition patient  - Assist with mobility/ambulation  - Relieve pressure over bony prominences  - Avoid friction and shearing  - Provide appropriate hygiene as needed including keeping skin clean and dry  - Evaluate need for skin moisturizer/barrier cream  - Collaborate with interdisciplinary team   - Patient/family teaching  - Consider wound care consult   Outcome: Progressing     - Apply yellow socks and bracelet for high fall risk patients  - Consider moving patient to room near nurses station  Outcome: Progressing

## 2024-03-29 NOTE — PHYSICAL THERAPY NOTE
PT cancel note       03/29/24 1011   PT Last Visit   PT Visit Date 03/29/24   Note Type   Note type Cancelled Session   Additional Comments   (pt was sleeping and wife requested that we hold therapy)     Bel Brantley

## 2024-03-29 NOTE — PLAN OF CARE
Problem: INFECTION - ADULT  Goal: Absence or prevention of progression during hospitalization  Description: INTERVENTIONS:  - Assess and monitor for signs and symptoms of infection  - Monitor lab/diagnostic results  - Monitor all insertion sites, i.e. indwelling lines, tubes, and drains  - Monitor endotracheal if appropriate and nasal secretions for changes in amount and color  - [place] appropriate cooling/warming therapies per order  - Administer medications as ordered  - Instruct and encourage patient and family to use good hand hygiene technique  - Identify and instruct in appropriate isolation precautions for identified infection/condition  Outcome: Progressing     Problem: SAFETY ADULT  Goal: Maintain or return to baseline ADL function  Description: INTERVENTIONS:  -  Assess patient's ability to carry out ADLs; assess patient's baseline for ADL function and identify physical deficits which impact ability to perform ADLs (bathing, care of mouth/teeth, toileting, grooming, dressing, etc.)  - Assess/evaluate cause of self-care deficits   - Assess range of motion  - Assess patient's mobility; develop plan if impaired  - Assess patient's need for assistive devices and provide as appropriate  - Encourage maximum independence but intervene and supervise when necessary  - Involve family in performance of ADLs  - Assess for home care needs following discharge   - Consider OT consult to assist with ADL evaluation and planning for discharge  - Provide patient education as appropriate  Outcome: Progressing     Problem: Prexisting or High Potential for Compromised Skin Integrity  Goal: Skin integrity is maintained or improved  Description: INTERVENTIONS:  - Identify patients at risk for skin breakdown  - Assess and monitor skin integrity  - Assess and monitor nutrition and hydration status  - Monitor labs   - Assess for incontinence   - Turn and reposition patient  - Assist with mobility/ambulation  - Relieve pressure  over bony prominences  - Avoid friction and shearing  - Provide appropriate hygiene as needed including keeping skin clean and dry  - Evaluate need for skin moisturizer/barrier cream  - Collaborate with interdisciplinary team   - Patient/family teaching  - Consider wound care consult   Outcome: Progressing

## 2024-03-30 LAB
GLUCOSE SERPL-MCNC: 141 MG/DL (ref 65–140)
GLUCOSE SERPL-MCNC: 154 MG/DL (ref 65–140)
GLUCOSE SERPL-MCNC: 157 MG/DL (ref 65–140)
GLUCOSE SERPL-MCNC: 165 MG/DL (ref 65–140)

## 2024-03-30 PROCEDURE — 99232 SBSQ HOSP IP/OBS MODERATE 35: CPT | Performed by: INTERNAL MEDICINE

## 2024-03-30 PROCEDURE — 82948 REAGENT STRIP/BLOOD GLUCOSE: CPT

## 2024-03-30 RX ORDER — QUETIAPINE 150 MG/1
150 TABLET, FILM COATED, EXTENDED RELEASE ORAL 2 TIMES DAILY
Qty: 30 TABLET | Refills: 0 | Status: SHIPPED | OUTPATIENT
Start: 2024-03-30

## 2024-03-30 RX ORDER — TEMAZEPAM 30 MG/1
30 CAPSULE ORAL
Qty: 30 CAPSULE | Refills: 0 | Status: SHIPPED | OUTPATIENT
Start: 2024-03-30 | End: 2024-04-01

## 2024-03-30 RX ORDER — QUETIAPINE 300 MG/1
300 TABLET, FILM COATED, EXTENDED RELEASE ORAL
Qty: 30 TABLET | Refills: 0 | Status: SHIPPED | OUTPATIENT
Start: 2024-03-30

## 2024-03-30 RX ORDER — ZONISAMIDE 100 MG/1
300 CAPSULE ORAL
Qty: 90 CAPSULE | Refills: 0 | Status: SHIPPED | OUTPATIENT
Start: 2024-03-30 | End: 2024-04-01

## 2024-03-30 RX ADMIN — INSULIN LISPRO 1 UNITS: 100 INJECTION, SOLUTION INTRAVENOUS; SUBCUTANEOUS at 17:36

## 2024-03-30 RX ADMIN — LEVETIRACETAM 1500 MG: 500 TABLET, FILM COATED ORAL at 09:12

## 2024-03-30 RX ADMIN — ZONISAMIDE 300 MG: 100 CAPSULE ORAL at 22:14

## 2024-03-30 RX ADMIN — ENOXAPARIN SODIUM 40 MG: 40 INJECTION SUBCUTANEOUS at 09:17

## 2024-03-30 RX ADMIN — GABAPENTIN 400 MG: 400 CAPSULE ORAL at 22:14

## 2024-03-30 RX ADMIN — LACOSAMIDE 150 MG: 150 TABLET ORAL at 09:16

## 2024-03-30 RX ADMIN — LEVETIRACETAM 1500 MG: 500 TABLET, FILM COATED ORAL at 22:14

## 2024-03-30 RX ADMIN — GABAPENTIN 400 MG: 400 CAPSULE ORAL at 16:32

## 2024-03-30 RX ADMIN — LACOSAMIDE 200 MG: 150 TABLET ORAL at 22:14

## 2024-03-30 RX ADMIN — INSULIN LISPRO 1 UNITS: 100 INJECTION, SOLUTION INTRAVENOUS; SUBCUTANEOUS at 21:50

## 2024-03-30 RX ADMIN — TEMAZEPAM 30 MG: 7.5 CAPSULE ORAL at 22:14

## 2024-03-30 RX ADMIN — DIVALPROEX SODIUM 250 MG: 250 TABLET, FILM COATED, EXTENDED RELEASE ORAL at 09:16

## 2024-03-30 RX ADMIN — LEVOTHYROXINE SODIUM 150 MCG: 75 TABLET ORAL at 09:12

## 2024-03-30 RX ADMIN — QUETIAPINE FUMARATE 150 MG: 150 TABLET, EXTENDED RELEASE ORAL at 09:18

## 2024-03-30 RX ADMIN — LISINOPRIL 10 MG: 10 TABLET ORAL at 09:13

## 2024-03-30 RX ADMIN — LORATADINE 10 MG: 10 TABLET ORAL at 09:13

## 2024-03-30 RX ADMIN — DIVALPROEX SODIUM 500 MG: 500 TABLET, DELAYED RELEASE ORAL at 09:16

## 2024-03-30 RX ADMIN — PRAVASTATIN SODIUM 80 MG: 40 TABLET ORAL at 16:32

## 2024-03-30 RX ADMIN — QUETIAPINE FUMARATE 300 MG: 150 TABLET, EXTENDED RELEASE ORAL at 22:14

## 2024-03-30 RX ADMIN — B-COMPLEX W/ C & FOLIC ACID TAB 1 TABLET: TAB at 09:13

## 2024-03-30 RX ADMIN — QUETIAPINE FUMARATE 150 MG: 150 TABLET, EXTENDED RELEASE ORAL at 16:32

## 2024-03-30 RX ADMIN — DIVALPROEX SODIUM 1000 MG: 500 TABLET, DELAYED RELEASE ORAL at 22:19

## 2024-03-30 RX ADMIN — TAMSULOSIN HYDROCHLORIDE 0.4 MG: 0.4 CAPSULE ORAL at 16:32

## 2024-03-30 RX ADMIN — GABAPENTIN 400 MG: 400 CAPSULE ORAL at 09:13

## 2024-03-30 NOTE — CASE MANAGEMENT
Case Management Discharge Planning Note    Patient name Jairon Oconnell  Location /-01 MRN [id number]  : 1965 Date 3/29/2024       Current Admission Date: 3/1/2024  Current Admission Diagnosis:Ambulatory dysfunction   Patient Active Problem List    Diagnosis Date Noted    Urinary retention 2024    Nondisplaced fracture of greater trochanter of right femur, subsequent encounter for closed fracture with routine healing 2024    Closed nondisplaced fracture of proximal phalanx of left index finger with routine healing, subsequent encounter 10/30/2023    Pressure ulcer of sacral region, stage 3 (HCC) 2023    Ambulatory dysfunction 2022    Social problem 2021    Hypomagnesemia 2021    Thrombocytopathia ([facility]) 2020    Hyponatremia 2019    Metabolic encephalopathy 2019    Seborrheic dermatitis of scalp 2019    Nearsightedness 2019    Behavior concern in adult 2019    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HCC) 2019    Vitamin B12 deficiency 2017    Mixed hyperlipidemia 2016    Vitamin D deficiency 2016    Type 2 diabetes mellitus with diabetic neuropathy, without long-term current use of insulin ([facility]) 06/15/2016    Acquired hypothyroidism 06/15/2016    Cataract 2013    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    Generalized convulsive epilepsy ([facility]) 2013    Essential hypertension 2013    Osteoporosis 2013    Scoliosis 2013      LOS (days): 28  Geometric Mean LOS (GMLOS) (days): 3.9  Days to GMLOS:-24.8     OBJECTIVE:  Risk of Unplanned Readmission Score: 28.92         Current admission status: Inpatient   Preferred Pharmacy:   [facility] [place] - [address]  [address] [place] [address]  Phone: [phone] Fax: [phone]    [id number] [place] - [address] [address]  Phone:  [phone] Fax: [phone]    Primary Care Provider: HUNTER Brown    Primary Insurance: MEDICARE  Secondary Insurance[place] [employer]    DISCHARGE DETAILS:    Discharge planning discussed with:: patient & Kemi at the bedside- Madeline was caleld at 12:10 pm LM & Sara was called at 15:16pm LM  Freedom of Choice: Yes  Comments - Freedom of Choice: pt has been accepted to a new Life sharing home wiht Sara Walker-  pt was accepted by [facility]  - the family & teams choice  CM contacted family/caregiver?: Yes             Contacts  Patient Contacts: Kemi  Relationship to Patient:: Family (foster sister)  Contact Method: In Person  Reason/Outcome: Discharge Planning    Requested Home Health Care         Is the patient interested in C at discharge?: Yes    DME Referral Provided  Referral made for DME?: Yes  DME referral completed for the following items:: Bedside Commode  DME Supplier Name:: [employer]    Other Referral/Resources/Interventions Provided:  Interventions: DME, HHC  Referral Comments: pt accepted by [facility] - family & teams choice- commode ordered via [employer] via parachute  with permission - they are still process the request since 3/28/2024    Would you like to participate in our [facility] service program?  : No - Declined    Treatment Team Recommendation: Home with Home Health Care (home with caregiver & [facility] & outpt follow up- caregiver)                                   IMM Given (Date):: 03/29/24  IMM Given to:: Family (foster sister)     Additional Comments: cm left messages for Madeline & Keely to let them know the pt's walker is not at the hospital - cm attempted to reach Jaylyn to se if his walker was at her home and there was no answer

## 2024-03-30 NOTE — CASE MANAGEMENT
Case Management Discharge Planning Note    Patient name Jairon Oconnell  Location /-01 MRN [id number]  : 1965 Date 3/30/2024       Current Admission Date: 3/1/2024  Current Admission Diagnosis:Ambulatory dysfunction   Patient Active Problem List    Diagnosis Date Noted    Urinary retention 2024    Nondisplaced fracture of greater trochanter of right femur, subsequent encounter for closed fracture with routine healing 2024    Closed nondisplaced fracture of proximal phalanx of left index finger with routine healing, subsequent encounter 10/30/2023    Pressure ulcer of sacral region, stage 3 (HCC) 2023    Ambulatory dysfunction 2022    Social problem 2021    Hypomagnesemia 2021    Thrombocytopathia ([facility]) 2020    Hyponatremia 2019    Metabolic encephalopathy 2019    Seborrheic dermatitis of scalp 2019    Nearsightedness 2019    Behavior concern in adult 2019    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HCC) 2019    Vitamin B12 deficiency 2017    Mixed hyperlipidemia 2016    Vitamin D deficiency 2016    Type 2 diabetes mellitus with diabetic neuropathy, without long-term current use of insulin ([facility]) 06/15/2016    Acquired hypothyroidism 06/15/2016    Cataract 2013    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    Generalized convulsive epilepsy ([facility]) 2013    Essential hypertension 2013    Osteoporosis 2013    Scoliosis 2013      LOS (days): 29  Geometric Mean LOS (GMLOS) (days): 3.9  Days to GMLOS:-25.5     OBJECTIVE:  Risk of Unplanned Readmission Score: 29.42         Current admission status: Inpatient   Preferred Pharmacy:   [facility] - [place] - [address]  [address] [place] [address]  Phone: [phone] Fax: [phone]    [id number] - [place] - [address] [address]  Phone:  [phone] Fax: [phone]    Primary Care Provider: HUNTER Brown    Primary Insurance: MEDICARE  Secondary [place]    DISCHARGE DETAILS:       Additional Comments: Caregiver Betty Huffman was to transport pt to her new home today at 1 PM. CM received message at 1231 PM stating Gloria needed to speak to  asap.  called Betty at [phone]. Per Betty she just moved into her new home last night. There are boxes everywhere and no furniture is put together as yet. She feels it is unsafe for pt to come to the new home today.  expressed the importance of pt being discharged from the hospital asap as there is no medical necessity for pt to be admitted at his time. Betty assured  the home will be ready on Monday 4/1. She will contact  on Monday with transport time. Dr. Diaz and pt nurse updated.

## 2024-03-30 NOTE — PLAN OF CARE
Problem: PAIN - ADULT  Goal: Verbalizes/displays adequate comfort level or baseline comfort level  Description: Interventions:  - Encourage patient to monitor pain and request assistance  - Assess pain using appropriate pain scale  - Administer analgesics based on type and severity of pain and evaluate response  - Implement non-pharmacological measures as appropriate and evaluate response  - Consider cultural and social influences on pain and pain management  - Notify physician/advanced practitioner if interventions unsuccessful or patient reports new pain  Outcome: Progressing     Problem: INFECTION - ADULT  Goal: Absence or prevention of progression during hospitalization  Description: INTERVENTIONS:  - Assess and monitor for signs and symptoms of infection  - Monitor lab/diagnostic results  - Monitor all insertion sites, i.e. indwelling lines, tubes, and drains  - Monitor endotracheal if appropriate and nasal secretions for changes in amount and color  - [place] appropriate cooling/warming therapies per order  - Administer medications as ordered  - Instruct and encourage patient and family to use good hand hygiene technique  - Identify and instruct in appropriate isolation precautions for identified infection/condition  Outcome: Progressing     Problem: SAFETY ADULT  Goal: Patient will remain free of falls  Description: INTERVENTIONS:  - Educate patient/family on patient safety including physical limitations  - Instruct patient to call for assistance with activity   - Consult OT/PT to assist with strengthening/mobility   - Keep Call bell within reach  - Keep bed low and locked with side rails adjusted as appropriate  - Keep care items and personal belongings within reach  - Initiate and maintain comfort rounds  - Make Fall Risk Sign visible to staff  - Offer Toileting every 2 Hours, in advance of need  - Initiate/Maintain bed alarm  - Obtain necessary fall risk management equipment: socks  - Apply yellow  socks and bracelet for high fall risk patients  - Consider moving patient to room near nurses station  Outcome: Progressing     Problem: Knowledge Deficit  Goal: Patient/family/caregiver demonstrates understanding of disease process, treatment plan, medications, and discharge instructions  Description: Complete learning assessment and assess knowledge base.  Interventions:  - Provide teaching at level of understanding  - Provide teaching via preferred learning methods  Outcome: Progressing     Problem: Prexisting or High Potential for Compromised Skin Integrity  Goal: Skin integrity is maintained or improved  Description: INTERVENTIONS:  - Identify patients at risk for skin breakdown  - Assess and monitor skin integrity  - Assess and monitor nutrition and hydration status  - Monitor labs   - Assess for incontinence   - Turn and reposition patient  - Assist with mobility/ambulation  - Relieve pressure over bony prominences  - Avoid friction and shearing  - Provide appropriate hygiene as needed including keeping skin clean and dry  - Evaluate need for skin moisturizer/barrier cream  - Collaborate with interdisciplinary team   - Patient/family teaching  - Consider wound care consult   Outcome: Progressing

## 2024-03-30 NOTE — ASSESSMENT & PLAN NOTE
Continue Depakote  mg daily, Depakote  mg daily, Depakote DR 1000 mg HS (per  must be brand name) Neurontin 400 mg 3 times daily, Vimpat 150 mg daily and 200 mg HS, zonisamide 300 mg HS, and Keppra 1500 mg every 12 hours.    Continue seizure precautions         Follow-up with neurology as outpatient

## 2024-03-30 NOTE — DISCHARGE SUMMARY
[facility]  Discharge- Jairon Oconnell 1965, 58 y.o. male MRN: [id number]  Unit/Bed#: [facility] Encounter: [id number]  Primary Care Provider: HUNTER Brown   Date and time admitted to hospital: 3/1/2024  2:13 AM    Urinary retention  Assessment & Plan  S/p chapman catheter, since discontinued   Continue tamsulosin  Follow-up with PCP/urology as outpatient            Pressure ulcer of sacral region, stage 3 (HCC)  Assessment & Plan  POA  Wound care recommendations appreciated: Scarring to the left lateral buttock from stage 3 pressure injury in the past. Scarring is intact and blanchable and hyperpigmented   Turn and reposition every 2 hours, frequent offloading                  Behavior concern in adult  Assessment & Plan  He has been mostly cooperative without any agitation or behavioral issues, however recently became upset and refused some care. His sister at bedside takes this to be frustration from him that he is still in the hospital   Seroquel XR decreased to 150 BID and 300 HS due to excessive sleepiness    Per nursing report, patient has not been sleeping well.  He had Restoril 15 mg as needed which generally has been given. Increased dose to 30 mg nightly as a routine order     Monitor behaviors, assess sleep-wake cycle, monitor for excessive sleepiness           Acquired hypothyroidism  Assessment & Plan  Continue levothyroxine 150 mcg PO daily                Essential hypertension  Assessment & Plan  Continue lisinopril 10 mg daily  Monitor blood pressure                    Mixed hyperlipidemia  Assessment & Plan  Continue home statin            Generalized convulsive epilepsy (HCC)  Assessment & Plan  Continue Depakote  mg daily, Depakote  mg daily, Depakote DR 1000 mg HS (per  must be brand name) Neurontin 400 mg 3 times daily, Vimpat 150 mg daily and 200 mg HS, zonisamide 300 mg HS, and Keppra 1500 mg every 12 hours.    Continue seizure  precautions         Follow-up with neurology as outpatient                Type 2 diabetes mellitus with diabetic neuropathy, without long-term current use of insulin (HCC)  Assessment & Plan  Lab Results   Component Value Date    HGBA1C 5.5 11/27/2023       Recent Labs     03/29/24  1052 03/29/24  1551 03/29/24  2147 03/30/24  0654   POCGLU 220* 161* 140 141*         Blood Sugar Average: Last 72 hrs:  (P) [id number]    Resume metformin on discharge    Ataxic cerebral palsy (HCC)  Assessment & Plan  Continue supportive care    Continue current medications                * Ambulatory dysfunction  Assessment & Plan  With non-operative right hip greater trochanter fracture seen and evaluated by orthopedic surgery on the previous admission  He was discharged to a skilled nursing facility, became agitated, broke a chair, and was reported to become aggressive with staff.  The facility will now not take him back  Psychiatry recommendations appreciated. No indication for inpatient psychiatric hospitalization at this time.  Recommendation is rehab/SNF  PT/OT recommendations appreciated.  Medically clear for discharge to rehab facility   Case management following, input appreciated.  Patient will be discharged today.  He will be going home with caregiver who will be taking care of patient         Medical Problems       Resolved Problems  Date Reviewed: 3/28/2024   None       Discharging Physician / Practitioner: Amador Diaz MD  PCP: HUNTER Brown  Admission Date:   Admission Orders (From admission, onward)       Ordered        03/01/24 0220  Inpatient Admission  Once                          Discharge Date: 03/30/24    Consultations During Hospital Stay:  Behavioral health    Procedures Performed:   None    Significant Findings / Test Results:   Ambulatory dysfunction    Incidental Findings:   None    Test Results Pending at Discharge (will require follow up):   None     Outpatient Tests  Requested:  Routine labs with PCP as outpatient    Complications: None    Reason for Admission: Ambulatory dysfunction    Hospital Course:   Jairon Oconnell is a 58 y.o. male patient who originally presented to the hospital on 3/1/2024 due to right hip pain.  Patient was initially discharged over 1 month ago to rehab facility however due to behavioral disturbance/ambulatory dysfunction patient was returned to the hospital.  Patient was initially admitted to [facility] and then transferred to [facility] for discharge planning given complicated social/discharge issues.  Patient has remained in our facility for the last few weeks for case management to find a safe discharge plan for patient.  During hospitalization patient's medications have been optimized.  Please see medication reconciliation for final medication list.  Patient will be discharged today.    Please see above list of diagnoses and related plan for additional information.     Condition at Discharge: stable    Discharge Day Visit / Exam:   Subjective: No complaints at this time  Vitals: Blood Pressure: 132/82 (03/30/24 0742)  Pulse: (!) 106 (03/29/24 2300)  Temperature: (!) 97.4 °F (36.3 °C) (03/30/24 0742)  Temp Source: Tympanic (03/29/24 2300)  Respirations: 18 (03/30/24 0742)  Height: 6' (182.9 cm) (03/13/24 0721)  Weight - Scale: 78.1 kg (172 lb 2.9 oz) (03/30/24 0541)  SpO2: 91 % (03/29/24 2300)  Exam:   Physical Exam  Constitutional:       General: He is not in acute distress.  HENT:      Head: Normocephalic and atraumatic.      Nose: Nose normal.      Mouth/Throat:      Mouth: Mucous membranes are moist.   Eyes:      Extraocular Movements: Extraocular movements intact.      Conjunctiva/sclera: Conjunctivae normal.   Cardiovascular:      Rate and Rhythm: Normal rate and regular rhythm.   Pulmonary:      Effort: Pulmonary effort is normal. No respiratory distress.   Abdominal:      Palpations: Abdomen is soft.      Tenderness: There is no  abdominal tenderness.   Musculoskeletal:         General: Normal range of motion.      Cervical back: Normal range of motion and neck supple.   Skin:     General: Skin is warm and dry.   Neurological:      Mental Status: He is alert.      Comments: Patient awake and alert.  Following simple commands   Psychiatric:         Mood and Affect: Affect is flat.         Cognition and Memory: Cognition is impaired.          Discussion with Family:  Spoke with patient's family at bedside yesterday regarding plan of care.     Discharge instructions/Information to patient and family:   See after visit summary for information provided to patient and family.      Provisions for Follow-Up Care:  See after visit summary for information related to follow-up care and any pertinent home health orders.      Mobility at time of Discharge:   Basic Mobility Inpatient Raw Score: 19  JH-HLM Goal: 6: Walk 10 steps or more  JH-HLM Achieved: 6: Walk 10 steps or more  HLM Goal achieved. Continue to encourage appropriate mobility.     Disposition:   Home with VNA Services (Reminder: Complete face to face encounter)    Planned Readmission: no     Discharge Statement:  I spent 35 minutes discharging the patient. This time was spent on the day of discharge. I had direct contact with the patient on the day of discharge. Greater than 50% of the total time was spent examining patient, answering all patient questions, arranging and discussing plan of care with patient as well as directly providing post-discharge instructions.  Additional time then spent on discharge activities.    Discharge Medications:  See after visit summary for reconciled discharge medications provided to patient and/or family.      **Please Note: This note may have been constructed using a voice recognition system**

## 2024-03-30 NOTE — CASE MANAGEMENT
Case Management Progress Note    Patient name Jairon Oconnell  Location /-01 MRN [id number]  : 1965 Date 3/29/2024       LOS (days): 28  Geometric Mean LOS (GMLOS) (days): 3.9  Days to GMLOS:-24.8        OBJECTIVE:        Current admission status: Inpatient  Preferred Pharmacy:   [facility] - [place] - [address]  [address] [address]  Phone: [phone] Fax: [phone]    [id number] [address] [address]  Phone: [phone] Fax: [phone]    Primary Care Provider: HUNTER Brown    Primary Insurance: MEDICARE  Secondary Insurance[place] [employer] [place]    PROGRESS NOTE:    Pt has his medication in the pharmacy that needs to go with him & AVS needs to be faxed to [facility] fax # [phone]

## 2024-03-30 NOTE — ASSESSMENT & PLAN NOTE
S/p chapman catheter, since discontinued   Continue tamsulosin  Follow-up with PCP/urology as outpatient

## 2024-03-30 NOTE — ASSESSMENT & PLAN NOTE
Lab Results   Component Value Date    HGBA1C 5.5 11/27/2023       Recent Labs     03/29/24  1052 03/29/24  1551 03/29/24  2147 03/30/24  0654   POCGLU 220* 161* 140 141*         Blood Sugar Average: Last 72 hrs:  (P) [id number]    Resume metformin on discharge

## 2024-03-30 NOTE — PLAN OF CARE
Problem: INFECTION - ADULT  Goal: Absence or prevention of progression during hospitalization  Description: INTERVENTIONS:  - Assess and monitor for signs and symptoms of infection  - Monitor lab/diagnostic results  - Monitor all insertion sites, i.e. indwelling lines, tubes, and drains  - Monitor endotracheal if appropriate and nasal secretions for changes in amount and color  - [place] appropriate cooling/warming therapies per order  - Administer medications as ordered  - Instruct and encourage patient and family to use good hand hygiene technique  - Identify and instruct in appropriate isolation precautions for identified infection/condition  Outcome: Progressing  Goal: Absence of fever/infection during neutropenic period  Description: INTERVENTIONS:  - Monitor WBC    Outcome: Progressing     Problem: DISCHARGE PLANNING  Goal: Discharge to home or other facility with appropriate resources  Description: INTERVENTIONS:  - Identify barriers to discharge w/patient and caregiver  - Arrange for needed discharge resources and transportation as appropriate  - Identify discharge learning needs (meds, wound care, etc.)  - Arrange for interpretive services to assist at discharge as needed  - Refer to Case Management Department for coordinating discharge planning if the patient needs post-hospital services based on physician/advanced practitioner order or complex needs related to functional status, cognitive ability, or social support system  Outcome: Progressing

## 2024-03-30 NOTE — ASSESSMENT & PLAN NOTE
With non-operative right hip greater trochanter fracture seen and evaluated by orthopedic surgery on the previous admission  He was discharged to a skilled nursing facility, became agitated, broke a chair, and was reported to become aggressive with staff.  The facility will now not take him back  Psychiatry recommendations appreciated. No indication for inpatient psychiatric hospitalization at this time.  Recommendation is rehab/SNF  PT/OT recommendations appreciated.  Medically clear for discharge to rehab facility   Case management following, input appreciated.  Patient will be discharged today.  He will be going home with caregiver who will be taking care of patient

## 2024-03-31 LAB
GLUCOSE SERPL-MCNC: 148 MG/DL (ref 65–140)
GLUCOSE SERPL-MCNC: 159 MG/DL (ref 65–140)
GLUCOSE SERPL-MCNC: 162 MG/DL (ref 65–140)
GLUCOSE SERPL-MCNC: 230 MG/DL (ref 65–140)

## 2024-03-31 PROCEDURE — 82948 REAGENT STRIP/BLOOD GLUCOSE: CPT

## 2024-03-31 PROCEDURE — 99231 SBSQ HOSP IP/OBS SF/LOW 25: CPT | Performed by: INTERNAL MEDICINE

## 2024-03-31 RX ADMIN — LISINOPRIL 10 MG: 10 TABLET ORAL at 08:54

## 2024-03-31 RX ADMIN — ENOXAPARIN SODIUM 40 MG: 40 INJECTION SUBCUTANEOUS at 08:54

## 2024-03-31 RX ADMIN — QUETIAPINE FUMARATE 150 MG: 150 TABLET, EXTENDED RELEASE ORAL at 08:57

## 2024-03-31 RX ADMIN — QUETIAPINE FUMARATE 150 MG: 150 TABLET, EXTENDED RELEASE ORAL at 14:26

## 2024-03-31 RX ADMIN — DIVALPROEX SODIUM 1000 MG: 500 TABLET, DELAYED RELEASE ORAL at 21:14

## 2024-03-31 RX ADMIN — LEVOTHYROXINE SODIUM 150 MCG: 75 TABLET ORAL at 08:53

## 2024-03-31 RX ADMIN — INSULIN LISPRO 1 UNITS: 100 INJECTION, SOLUTION INTRAVENOUS; SUBCUTANEOUS at 16:44

## 2024-03-31 RX ADMIN — LACOSAMIDE 150 MG: 150 TABLET ORAL at 08:54

## 2024-03-31 RX ADMIN — INSULIN LISPRO 3 UNITS: 100 INJECTION, SOLUTION INTRAVENOUS; SUBCUTANEOUS at 21:14

## 2024-03-31 RX ADMIN — ZONISAMIDE 300 MG: 100 CAPSULE ORAL at 21:14

## 2024-03-31 RX ADMIN — QUETIAPINE FUMARATE 300 MG: 150 TABLET, EXTENDED RELEASE ORAL at 21:13

## 2024-03-31 RX ADMIN — LEVETIRACETAM 1500 MG: 500 TABLET, FILM COATED ORAL at 21:13

## 2024-03-31 RX ADMIN — INSULIN LISPRO 1 UNITS: 100 INJECTION, SOLUTION INTRAVENOUS; SUBCUTANEOUS at 12:19

## 2024-03-31 RX ADMIN — TEMAZEPAM 30 MG: 7.5 CAPSULE ORAL at 21:13

## 2024-03-31 RX ADMIN — PRAVASTATIN SODIUM 80 MG: 40 TABLET ORAL at 16:44

## 2024-03-31 RX ADMIN — GABAPENTIN 400 MG: 400 CAPSULE ORAL at 16:44

## 2024-03-31 RX ADMIN — DIVALPROEX SODIUM 250 MG: 250 TABLET, FILM COATED, EXTENDED RELEASE ORAL at 08:57

## 2024-03-31 RX ADMIN — ACETAMINOPHEN 650 MG: 325 TABLET ORAL at 19:51

## 2024-03-31 RX ADMIN — GABAPENTIN 400 MG: 400 CAPSULE ORAL at 08:54

## 2024-03-31 RX ADMIN — B-COMPLEX W/ C & FOLIC ACID TAB 1 TABLET: TAB at 08:53

## 2024-03-31 RX ADMIN — LEVETIRACETAM 1500 MG: 500 TABLET, FILM COATED ORAL at 08:53

## 2024-03-31 RX ADMIN — LORATADINE 10 MG: 10 TABLET ORAL at 08:53

## 2024-03-31 RX ADMIN — LACOSAMIDE 200 MG: 150 TABLET ORAL at 21:13

## 2024-03-31 RX ADMIN — TAMSULOSIN HYDROCHLORIDE 0.4 MG: 0.4 CAPSULE ORAL at 16:44

## 2024-03-31 RX ADMIN — GABAPENTIN 400 MG: 400 CAPSULE ORAL at 21:14

## 2024-03-31 RX ADMIN — DIVALPROEX SODIUM 500 MG: 500 TABLET, DELAYED RELEASE ORAL at 08:58

## 2024-03-31 NOTE — ASSESSMENT & PLAN NOTE
Lab Results   Component Value Date    HGBA1C 5.5 11/27/2023       Recent Labs     03/30/24  1106 03/30/24  1556 03/30/24 2059 03/31/24  0701   POCGLU 154* 157* 165* 148*         Blood Sugar Average: Last 72 hrs:  (P) [id number]    Continue insulin sliding scale  Resume metformin on discharge

## 2024-03-31 NOTE — PLAN OF CARE
Problem: PAIN - ADULT  Goal: Verbalizes/displays adequate comfort level or baseline comfort level  Description: Interventions:  - Encourage patient to monitor pain and request assistance  - Assess pain using appropriate pain scale  - Administer analgesics based on type and severity of pain and evaluate response  - Implement non-pharmacological measures as appropriate and evaluate response  - Consider cultural and social influences on pain and pain management  - Notify physician/advanced practitioner if interventions unsuccessful or patient reports new pain  Outcome: Progressing     Problem: INFECTION - ADULT  Goal: Absence or prevention of progression during hospitalization  Description: INTERVENTIONS:  - Assess and monitor for signs and symptoms of infection  - Monitor lab/diagnostic results  - Monitor all insertion sites, i.e. indwelling lines, tubes, and drains  - Monitor endotracheal if appropriate and nasal secretions for changes in amount and color  - [place] appropriate cooling/warming therapies per order  - Administer medications as ordered  - Instruct and encourage patient and family to use good hand hygiene technique  - Identify and instruct in appropriate isolation precautions for identified infection/condition  Outcome: Progressing  Goal: Absence of fever/infection during neutropenic period  Description: INTERVENTIONS:  - Monitor WBC    Outcome: Progressing     Problem: SAFETY ADULT  Goal: Patient will remain free of falls  Description: INTERVENTIONS:  - Educate patient/family on patient safety including physical limitations  - Instruct patient to call for assistance with activity   - Consult OT/PT to assist with strengthening/mobility   - Keep Call bell within reach  - Keep bed low and locked with side rails adjusted as appropriate  - Keep care items and personal belongings within reach  - Initiate and maintain comfort rounds  - Make Fall Risk Sign visible to staff  - Offer Toileting every 2 Hours, in  advance of need  - Initiate/Maintain bed alarm  - Obtain necessary fall risk management equipment  - Apply yellow socks and bracelet for high fall risk patients  - Consider moving patient to room near nurses station  Outcome: Progressing  Goal: Maintain or return to baseline ADL function  Description: INTERVENTIONS:  -  Assess patient's ability to carry out ADLs; assess patient's baseline for ADL function and identify physical deficits which impact ability to perform ADLs (bathing, care of mouth/teeth, toileting, grooming, dressing, etc.)  - Assess/evaluate cause of self-care deficits   - Assess range of motion  - Assess patient's mobility; develop plan if impaired  - Assess patient's need for assistive devices and provide as appropriate  - Encourage maximum independence but intervene and supervise when necessary  - Involve family in performance of ADLs  - Assess for home care needs following discharge   - Consider OT consult to assist with ADL evaluation and planning for discharge  - Provide patient education as appropriate  Outcome: Progressing  Goal: Maintains/Returns to pre admission functional level  Description: INTERVENTIONS:  - Perform AM-PAC 6 Click Basic Mobility/ Daily Activity assessment daily.  - Set and communicate daily mobility goal to care team and patient/family/caregiver.   - Collaborate with rehabilitation services on mobility goals if consulted  - Perform Range of Motion 3 times a day.  - Reposition patient every 2 hours.  - Dangle patient 3 times a day  - Stand patient 3 times a day  - Ambulate patient 3 times a day  - Out of bed to chair 3 times a day   - Out of bed for meals 3 times a day  - Out of bed for toileting  - Record patient progress and toleration of activity level   Outcome: Progressing     Problem: DISCHARGE PLANNING  Goal: Discharge to home or other facility with appropriate resources  Description: INTERVENTIONS:  - Identify barriers to discharge w/patient and caregiver  -  Arrange for needed discharge resources and transportation as appropriate  - Identify discharge learning needs (meds, wound care, etc.)  - Arrange for interpretive services to assist at discharge as needed  - Refer to Case Management Department for coordinating discharge planning if the patient needs post-hospital services based on physician/advanced practitioner order or complex needs related to functional status, cognitive ability, or social support system  Outcome: Progressing     Problem: Knowledge Deficit  Goal: Patient/family/caregiver demonstrates understanding of disease process, treatment plan, medications, and discharge instructions  Description: Complete learning assessment and assess knowledge base.  Interventions:  - Provide teaching at level of understanding  - Provide teaching via preferred learning methods  Outcome: Progressing

## 2024-03-31 NOTE — PLAN OF CARE
Problem: INFECTION - ADULT  Goal: Absence or prevention of progression during hospitalization  Description: INTERVENTIONS:  - Assess and monitor for signs and symptoms of infection  - Monitor lab/diagnostic results  - Monitor all insertion sites, i.e. indwelling lines, tubes, and drains  - Monitor endotracheal if appropriate and nasal secretions for changes in amount and color  - [place] appropriate cooling/warming therapies per order  - Administer medications as ordered  - Instruct and encourage patient and family to use good hand hygiene technique  - Identify and instruct in appropriate isolation precautions for identified infection/condition  Outcome: Progressing  Goal: Absence of fever/infection during neutropenic period  Description: INTERVENTIONS:  - Monitor WBC    Outcome: Progressing     Problem: SAFETY ADULT  Goal: Patient will remain free of falls  Description: INTERVENTIONS:  - Educate patient/family on patient safety including physical limitations  - Instruct patient to call for assistance with activity   - Consult OT/PT to assist with strengthening/mobility   - Keep Call bell within reach  - Keep bed low and locked with side rails adjusted as appropriate  - Keep care items and personal belongings within reach  - Initiate and maintain comfort rounds  - Make Fall Risk Sign visible to staff  - Offer Toileting every 2 Hours, in advance of need  - Initiate/Maintain alarms  - Obtain necessary fall risk management equipment:   - Apply yellow socks and bracelet for high fall risk patients  - Consider moving patient to room near nurses station  Outcome: Progressing  Goal: Maintain or return to baseline ADL function  Description: INTERVENTIONS:  -  Assess patient's ability to carry out ADLs; assess patient's baseline for ADL function and identify physical deficits which impact ability to perform ADLs (bathing, care of mouth/teeth, toileting, grooming, dressing, etc.)  - Assess/evaluate cause of self-care  deficits   - Assess range of motion  - Assess patient's mobility; develop plan if impaired  - Assess patient's need for assistive devices and provide as appropriate  - Encourage maximum independence but intervene and supervise when necessary  - Involve family in performance of ADLs  - Assess for home care needs following discharge   - Consider OT consult to assist with ADL evaluation and planning for discharge  - Provide patient education as appropriate  Outcome: Progressing  Goal: Maintains/Returns to pre admission functional level  Description: INTERVENTIONS:  - Perform AM-PAC 6 Click Basic Mobility/ Daily Activity assessment daily.  - Set and communicate daily mobility goal to care team and patient/family/caregiver.   - Collaborate with rehabilitation services on mobility goals if consulted  - Reposition patient every 2 hours.  - Out of bed to chair for meals   Out of bed for toileting  - Record patient progress and toleration of activity level   Outcome: Progressing

## 2024-03-31 NOTE — PROGRESS NOTES
[facility]  Progress Note  Name: Jairon Oconnell I  MRN: [id number]  Unit/Bed#: MS 209[facility] I Date of Admission: 3/1/2024   Date of Service: 3/31/2024 I Hospital Day: 30    Assessment/Plan   Urinary retention  Assessment & Plan  S/p chapman catheter, since discontinued   Continue tamsulosin  Follow-up with PCP/urology as outpatient            Pressure ulcer of sacral region, stage 3 (HCC)  Assessment & Plan  POA  Wound care recommendations appreciated: Scarring to the left lateral buttock from stage 3 pressure injury in the past. Scarring is intact and blanchable and hyperpigmented   Turn and reposition every 2 hours, frequent offloading                  Behavior concern in adult  Assessment & Plan  He has been mostly cooperative without any agitation or behavioral issues, however recently became upset and refused some care. His sister at bedside takes this to be frustration from him that he is still in the hospital   Seroquel XR decreased to 150 BID and 300 HS due to excessive sleepiness    Per nursing report, patient has not been sleeping well.  He had Restoril 15 mg as needed which generally has been given. Increased dose to 30 mg nightly as a routine order     Monitor behaviors, assess sleep-wake cycle, monitor for excessive sleepiness           Acquired hypothyroidism  Assessment & Plan  Continue levothyroxine 150 mcg PO daily                Essential hypertension  Assessment & Plan  Continue lisinopril 10 mg daily  Monitor blood pressure                    Mixed hyperlipidemia  Assessment & Plan  Continue home statin            Generalized convulsive epilepsy (HCC)  Assessment & Plan  Continue Depakote  mg daily, Depakote  mg daily, Depakote DR 1000 mg HS (per  must be brand name) Neurontin 400 mg 3 times daily, Vimpat 150 mg daily and 200 mg HS, zonisamide 300 mg HS, and Keppra 1500 mg every 12 hours.    Continue seizure precautions         Follow-up with  neurology as outpatient                Type 2 diabetes mellitus with diabetic neuropathy, without long-term current use of insulin (HCC)  Assessment & Plan  Lab Results   Component Value Date    HGBA1C 5.5 11/27/2023       Recent Labs     03/30/24  1106 03/30/24  1556 03/30/24 2059 03/31/24  0701   POCGLU 154* 157* 165* 148*         Blood Sugar Average: Last 72 hrs:  (P) [id number]    Continue insulin sliding scale  Resume metformin on discharge    Ataxic cerebral palsy (HCC)  Assessment & Plan  Continue supportive care    Continue current medications                * Ambulatory dysfunction  Assessment & Plan  With non-operative right hip greater trochanter fracture seen and evaluated by orthopedic surgery on the previous admission  He was discharged to a skilled nursing facility, became agitated, broke a chair, and was reported to become aggressive with staff.  The facility will now not take him back  Psychiatry recommendations appreciated. No indication for inpatient psychiatric hospitalization at this time.  Recommendation is rehab/SNF  PT/OT recommendations appreciated.  Medically clear for discharge to rehab facility   Case management following, input appreciated                VTE Pharmacologic Prophylaxis: VTE Score: 3 Moderate Risk (Score 3-4) - Pharmacological DVT Prophylaxis Ordered: enoxaparin (Lovenox).    Mobility:   Basic Mobility Inpatient Raw Score: 19  JH-HLM Goal: 6: Walk 10 steps or more  JH-HLM Achieved: 6: Walk 10 steps or more  JH-HLM Goal achieved. Continue to encourage appropriate mobility.    Patient Centered Rounds: I performed bedside rounds with nursing staff today.   Discussions with Specialists or Other Care Team Provider: yes    Education and Discussions with Family / Patient:  Updated patient regarding plan of care.     Current Length of Stay: 30 day(s)  Current Patient Status: Inpatient   Certification Statement: The patient will continue to require additional inpatient  hospital stay due to pending placement  Discharge Plan: Anticipate discharge in 24-48 hrs to home.    Code Status: Level 1 - Full Code    Subjective:   No overnight events noted    Objective:     Vitals:   Temp (24hrs), Av.8 °F (36 °C), Min:96.6 °F (35.9 °C), Max:97.2 °F (36.2 °C)    Temp:  [96.6 °F (35.9 °C)-97.2 °F (36.2 °C)] 96.6 °F (35.9 °C)  Resp:  [18] 18  BP: (114-144)/(60-78) 114/60  Body mass index is 23.26 kg/m².     Input and Output Summary (last 24 hours):     Intake/Output Summary (Last 24 hours) at 3/31/2024 0916  Last data filed at 3/31/2024 0525  Gross per 24 hour   Intake 600 ml   Output 500 ml   Net 100 ml       Physical Exam:   Physical Exam  Constitutional:       General: He is not in acute distress.     Comments: Frail  male   HENT:      Head: Normocephalic and atraumatic.      Nose: Nose normal.      Mouth/Throat:      Mouth: Mucous membranes are moist.   Eyes:      Extraocular Movements: Extraocular movements intact.      Conjunctiva/sclera: Conjunctivae normal.   Cardiovascular:      Rate and Rhythm: Normal rate and regular rhythm.   Pulmonary:      Effort: Pulmonary effort is normal. No respiratory distress.   Abdominal:      Palpations: Abdomen is soft.      Tenderness: There is no abdominal tenderness.   Musculoskeletal:         General: Normal range of motion.      Cervical back: Normal range of motion and neck supple.   Skin:     General: Skin is warm and dry.   Neurological:      General: No focal deficit present.      Mental Status: He is alert. Mental status is at baseline.   Psychiatric:         Mood and Affect: Affect is flat.         Cognition and Memory: Cognition is impaired.          Additional Data:     Labs:              Results from last 7 days   Lab Units 24  0701 24  2059 24  1556 24  1106 24  0654 24  2147 24  1551 24  1052 24  0647 24  2048 24  1607 24  1115   POC GLUCOSE mg/dl 148* 165*  157* 154* 141* 140 161* 220* 174* 150* 198* 173*               Lines/Drains:  Invasive Devices       None                   Imaging: No pertinent imaging reviewed.    Recent Cultures (last 7 days):         Last 24 Hours Medication List:   Current Facility-Administered Medications   Medication Dose Route Frequency Provider Last Rate    acetaminophen  650 mg Oral 4x Daily PRN Lizzie Jimi, CRNP      divalproex sodium  250 mg Oral Daily Lizzie Jimi, CRNP      divalproex sodium  1,000 mg Oral HS Lizzie Jimi, CRNP      divalproex sodium  500 mg Oral Daily Lizzie Jimi, CRNP      docusate sodium  100 mg Oral BID PRN Hasmukhagatha Becker, PA-C      enoxaparin  40 mg Subcutaneous Daily Hasmukh Becker, PA-C      gabapentin  400 mg Oral TID Hasmukh Becker, PA-C      ibuprofen  600 mg Oral Q6H PRN Ancelmo Chito Mota, PA-C      insulin lispro  1-5 Units Subcutaneous TID AC Hasmukh Becker, PA-C      insulin lispro  1-6 Units Subcutaneous HS Hasmukh Becker, PA-C      lacosamide  150 mg Oral Daily Hasmukh Becker, PA-C      lacosamide  200 mg Oral HS Hasmukhagatha Becker, PA-C      levETIRAcetam  1,500 mg Oral Q12H RONALD Hasmukh Becker, PA-C      levothyroxine  150 mcg Oral Daily Hasmukh Becker, PA-C      lisinopril  10 mg Oral Daily Hasmukhagatha Becker, PA-C      loratadine  10 mg Oral Daily Hasmukh Rosita, PA-C      multivitamin stress formula  1 tablet Oral Daily Hasmukh Becker, PA-C      OLANZapine  5 mg Intramuscular Q3H PRN Hasmukh Becker, PA-C      pravastatin  80 mg Oral Daily With Dinner Hasmukh Becker, PA-C      QUEtiapine  150 mg Oral BID Lizzie Jimi, CRNP      QUEtiapine  300 mg Oral HS Lizzie Jimi, CRNP      tamsulosin  0.4 mg Oral Daily With Dinner Lizzie Jimi, CRNP      temazepam  30 mg Oral HS Ashleyrin Good, CRNP      zonisamide  300 mg Oral HS Hasmukhagatha Becker, PAJuan DanielC          Today, Patient Was Seen By: Amador Diaz MD    **Please Note: This note may have been constructed using a voice recognition system.**
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
## 2024-04-01 ENCOUNTER — TELEPHONE (OUTPATIENT)
Dept: FAMILY MEDICINE CLINIC | Facility: CLINIC | Age: 59
End: 2024-04-01

## 2024-04-01 ENCOUNTER — TRANSITIONAL CARE MANAGEMENT (OUTPATIENT)
Dept: FAMILY MEDICINE CLINIC | Facility: CLINIC | Age: 59
End: 2024-04-01

## 2024-04-01 VITALS
BODY MASS INDEX: 23.23 KG/M2 | DIASTOLIC BLOOD PRESSURE: 78 MMHG | HEART RATE: 106 BPM | HEIGHT: 72 IN | OXYGEN SATURATION: 91 % | RESPIRATION RATE: 18 BRPM | TEMPERATURE: 97.4 F | WEIGHT: 171.52 LBS | SYSTOLIC BLOOD PRESSURE: 136 MMHG

## 2024-04-01 LAB
GLUCOSE SERPL-MCNC: 150 MG/DL (ref 65–140)
GLUCOSE SERPL-MCNC: 152 MG/DL (ref 65–140)

## 2024-04-01 PROCEDURE — 82948 REAGENT STRIP/BLOOD GLUCOSE: CPT

## 2024-04-01 PROCEDURE — 99239 HOSP IP/OBS DSCHRG MGMT >30: CPT | Performed by: NURSE PRACTITIONER

## 2024-04-01 RX ORDER — TEMAZEPAM 30 MG/1
30 CAPSULE ORAL
Qty: 10 CAPSULE | Refills: 0 | Status: SHIPPED | OUTPATIENT
Start: 2024-04-01 | End: 2024-04-11

## 2024-04-01 RX ORDER — ZONISAMIDE 100 MG/1
300 CAPSULE ORAL
Qty: 90 CAPSULE | Refills: 0 | Status: SHIPPED | OUTPATIENT
Start: 2024-04-01

## 2024-04-01 RX ADMIN — GABAPENTIN 400 MG: 400 CAPSULE ORAL at 08:15

## 2024-04-01 RX ADMIN — DIVALPROEX SODIUM 250 MG: 250 TABLET, FILM COATED, EXTENDED RELEASE ORAL at 09:52

## 2024-04-01 RX ADMIN — LORATADINE 10 MG: 10 TABLET ORAL at 08:18

## 2024-04-01 RX ADMIN — DIVALPROEX SODIUM 500 MG: 500 TABLET, DELAYED RELEASE ORAL at 09:52

## 2024-04-01 RX ADMIN — LEVETIRACETAM 1500 MG: 500 TABLET, FILM COATED ORAL at 08:15

## 2024-04-01 RX ADMIN — LISINOPRIL 10 MG: 10 TABLET ORAL at 08:15

## 2024-04-01 RX ADMIN — ENOXAPARIN SODIUM 40 MG: 40 INJECTION SUBCUTANEOUS at 08:18

## 2024-04-01 RX ADMIN — B-COMPLEX W/ C & FOLIC ACID TAB 1 TABLET: TAB at 08:15

## 2024-04-01 RX ADMIN — LEVOTHYROXINE SODIUM 150 MCG: 75 TABLET ORAL at 08:15

## 2024-04-01 RX ADMIN — LACOSAMIDE 150 MG: 150 TABLET ORAL at 09:52

## 2024-04-01 RX ADMIN — INSULIN LISPRO 1 UNITS: 100 INJECTION, SOLUTION INTRAVENOUS; SUBCUTANEOUS at 11:29

## 2024-04-01 RX ADMIN — QUETIAPINE FUMARATE 150 MG: 150 TABLET, EXTENDED RELEASE ORAL at 07:51

## 2024-04-01 RX ADMIN — INSULIN LISPRO 1 UNITS: 100 INJECTION, SOLUTION INTRAVENOUS; SUBCUTANEOUS at 07:48

## 2024-04-01 NOTE — PLAN OF CARE
Problem: PAIN - ADULT  Goal: Verbalizes/displays adequate comfort level or baseline comfort level  Description: Interventions:  - Encourage patient to monitor pain and request assistance  - Assess pain using appropriate pain scale  - Administer analgesics based on type and severity of pain and evaluate response  - Implement non-pharmacological measures as appropriate and evaluate response  - Consider cultural and social influences on pain and pain management  - Notify physician/advanced practitioner if interventions unsuccessful or patient reports new pain  Outcome: Adequate for Discharge     Problem: INFECTION - ADULT  Goal: Absence or prevention of progression during hospitalization  Description: INTERVENTIONS:  - Assess and monitor for signs and symptoms of infection  - Monitor lab/diagnostic results  - Monitor all insertion sites, i.e. indwelling lines, tubes, and drains  - Monitor endotracheal if appropriate and nasal secretions for changes in amount and color  - [place] appropriate cooling/warming therapies per order  - Administer medications as ordered  - Instruct and encourage patient and family to use good hand hygiene technique  - Identify and instruct in appropriate isolation precautions for identified infection/condition  Outcome: Adequate for Discharge  Goal: Absence of fever/infection during neutropenic period  Description: INTERVENTIONS:  - Monitor WBC    Outcome: Adequate for Discharge     Problem: SAFETY ADULT  Goal: Patient will remain free of falls  Description: INTERVENTIONS:  - Educate patient/family on patient safety including physical limitations  - Instruct patient to call for assistance with activity   - Consult OT/PT to assist with strengthening/mobility   - Keep Call bell within reach  - Keep bed low and locked with side rails adjusted as appropriate  - Keep care items and personal belongings within reach  - Initiate and maintain comfort rounds  - Make Fall Risk Sign visible to  staff  - Offer Toileting every 2 Hours, in advance of need  - Initiate/Maintain bed alarm  - Obtain necessary fall risk management equipment: non skid socks  - Apply yellow socks and bracelet for high fall risk patients  - Consider moving patient to room near nurses station  Outcome: Adequate for Discharge  Goal: Maintain or return to baseline ADL function  Description: INTERVENTIONS:  -  Assess patient's ability to carry out ADLs; assess patient's baseline for ADL function and identify physical deficits which impact ability to perform ADLs (bathing, care of mouth/teeth, toileting, grooming, dressing, etc.)  - Assess/evaluate cause of self-care deficits   - Assess range of motion  - Assess patient's mobility; develop plan if impaired  - Assess patient's need for assistive devices and provide as appropriate  - Encourage maximum independence but intervene and supervise when necessary  - Involve family in performance of ADLs  - Assess for home care needs following discharge   - Consider OT consult to assist with ADL evaluation and planning for discharge  - Provide patient education as appropriate  Outcome: Adequate for Discharge  Goal: Maintains/Returns to pre admission functional level  Description: INTERVENTIONS:  - Perform AM-PAC 6 Click Basic Mobility/ Daily Activity assessment daily.  - Set and communicate daily mobility goal to care team and patient/family/caregiver.   - Collaborate with rehabilitation services on mobility goals if consulted  - Perform Range of Motion 2 times a day.  - Reposition patient every 2 hours.  - Dangle patient 2 times a day  - Stand patient 2 times a day  - Ambulate patient 2 times a day  - Out of bed to chair 3 times a day   - Out of bed for meals 3 times a day  - Out of bed for toileting  - Record patient progress and toleration of activity level   Outcome: Adequate for Discharge     Problem: DISCHARGE PLANNING  Goal: Discharge to home or other facility with appropriate  resources  Description: INTERVENTIONS:  - Identify barriers to discharge w/patient and caregiver  - Arrange for needed discharge resources and transportation as appropriate  - Identify discharge learning needs (meds, wound care, etc.)  - Refer to Case Management Department for coordinating discharge planning if the patient needs post-hospital services based on physician/advanced practitioner order or complex needs related to functional status, cognitive ability, or social support system  Outcome: Adequate for Discharge     Problem: Knowledge Deficit  Goal: Patient/family/caregiver demonstrates understanding of disease process, treatment plan, medications, and discharge instructions  Description: Complete learning assessment and assess knowledge base.  Interventions:  - Provide teaching at level of understanding  - Provide teaching via preferred learning methods  Outcome: Adequate for Discharge

## 2024-04-01 NOTE — NURSING NOTE
Discharge instructions reviewed with pt and caregivers via virtual RN.  Verbalized understanding.  Medications obtained from pharmacy and given to caregivers.  Left in stable condition with all belongings and medications.

## 2024-04-01 NOTE — DISCHARGE SUMMARY
[facility]  Discharge- Jairon Oconnell 1965, 58 y.o. male MRN: [id number]  Unit/Bed#: [facility] [place] Encounter: [id number]  Primary Care Provider: HUNTER Brown   Date and time admitted to hospital: 3/1/2024  2:13 AM    * Ambulatory dysfunction  Assessment & Plan  With non-operative right hip greater trochanter fracture seen and evaluated by orthopedic surgery on the previous admission  He was discharged to a skilled nursing facility, became agitated, broke a chair, and was reported to become aggressive with staff.  The facility will now not take him back  Psychiatry recommendations appreciated. No indication for inpatient psychiatric hospitalization at this time.  Recommendation is rehab/SNF  PT/OT recommendations appreciated.  Medically clear for discharge to rehab facility   Case management following, input appreciated       Generalized convulsive epilepsy (HCC)  Assessment & Plan  Continue Depakote  mg daily, Depakote  mg daily, Depakote DR 1000 mg HS (per  must be brand name) Neurontin 400 mg 3 times daily, Vimpat 150 mg daily and 200 mg HS, zonisamide 300 mg HS, and Keppra 1500 mg every 12 hours.    Continue seizure precautions         Follow-up with neurology as outpatient                Type 2 diabetes mellitus with diabetic neuropathy, without long-term current use of insulin (HCC)  Assessment & Plan  Lab Results   Component Value Date    HGBA1C 5.5 11/27/2023       Recent Labs     03/31/24  1602 03/31/24  2103 04/01/24  0719 04/01/24  1049   POCGLU 162* 230* 152* 150*         Blood Sugar Average: Last 72 hrs:  (P) 161.8    Continue insulin sliding scale  Resume metformin on discharge    Urinary retention  Assessment & Plan  S/p chpaman catheter, since discontinued   Continue tamsulosin  Follow-up with PCP/urology as outpatient            Pressure ulcer of sacral region, stage 3 (HCC)  Assessment & Plan  POA  Wound care recommendations appreciated:  Scarring to the left lateral buttock from stage 3 pressure injury in the past. Scarring is intact and blanchable and hyperpigmented   Turn and reposition every 2 hours, frequent offloading                  Behavior concern in adult  Assessment & Plan  He has been mostly cooperative without any agitation or behavioral issues, however recently became upset and refused some care. His sister at bedside takes this to be frustration from him that he is still in the hospital   Seroquel XR decreased to 150 BID and 300 HS due to excessive sleepiness    Per nursing report, patient has not been sleeping well.  He had Restoril 15 mg as needed which generally has been given. Increased dose to 30 mg nightly as a routine order     Monitor behaviors, assess sleep-wake cycle, monitor for excessive sleepiness           Acquired hypothyroidism  Assessment & Plan  Continue levothyroxine 150 mcg PO daily                Essential hypertension  Assessment & Plan  Continue lisinopril 10 mg daily  Monitor blood pressure                    Mixed hyperlipidemia  Assessment & Plan  Continue home statin            Ataxic cerebral palsy (HCC)  Assessment & Plan  Continue supportive care    Continue current medications                  Medical Problems       Resolved Problems  Date Reviewed: 4/1/2024   None       Discharging Physician / Practitioner: HUNTER Main  PCP: HUNTER Brown  Admission Date:   Admission Orders (From admission, onward)       Ordered        03/01/24 0220  Inpatient Admission  Once                          Discharge Date: 04/01/24    Consultations During Hospital Stay:  Psychiatry    Significant Findings / Test Results:   No imaging studies performed at this visit    Reason for Admission: Agitation, social issues    Hospital Course:   Jairon Oconnell is a 58 y.o. male patient who originally presented to the hospital on 3/1/2024 due to agitation.  Patient has a history of residing in a group home for a long  time.  He sustained a hip fracture was being managed nonoperatively and suffered from ambulatory dysfunction.  He was placed in a nursing facility.  Shortly after arrival he became what is described as aggressive.  He was brought to the hospital.  The facility would not take him back, although later it was discovered that patient may not have received his medication at the facility. He was admitted to [facility].  He was then transferred to [facility] for placement needs.  Case management began to facilitate care coordination, although this proved to be a lengthy and complicated process.  His hospital course was essentially unremarkable.  He briefly had a Carrillo catheter for urinary retention which was later removed with a successful voiding trial. He had only a few small behavioral outbursts during hospitalization.  Ultimately, patient was discharged to a new group home with home health care with outpatient care manager following.  This is a brief discharge summary.  Please see full patient chart for additional details.    Condition at Discharge: stable    Discharge Day Visit / Exam:   Subjective: Patient seen and examined.  No complaints offered.  Vitals: Blood Pressure: 136/78 (04/01/24 0759)  Pulse: (!) 106 (03/29/24 2300)  Temperature: (!) 97.4 °F (36.3 °C) (03/31/24 2211)  Temp Source: Tympanic (03/29/24 2300)  Respirations: 18 (03/31/24 2211)  Height: 6' (182.9 cm) (03/13/24 0721)  Weight - Scale: 77.8 kg (171 lb 8.3 oz) (03/31/24 0557)  SpO2: 91 % (03/29/24 2300)    Exam:   Physical Exam  Vitals and nursing note reviewed.   Constitutional:       General: He is not in acute distress.  HENT:      Head: Normocephalic and atraumatic.      Nose: Nose normal.      Mouth/Throat:      Mouth: Mucous membranes are moist.      Pharynx: Oropharynx is clear.   Eyes:      Pupils: Pupils are equal, round, and reactive to light.   Cardiovascular:      Rate and Rhythm: Normal rate and regular rhythm.      Pulses: Normal  pulses.      Heart sounds: Normal heart sounds.   Pulmonary:      Effort: Pulmonary effort is normal. No respiratory distress.      Breath sounds: Normal breath sounds.   Abdominal:      General: Bowel sounds are normal.      Palpations: Abdomen is soft.      Tenderness: There is no abdominal tenderness.   Musculoskeletal:      Cervical back: Neck supple.      Right lower leg: No edema.      Left lower leg: No edema.   Skin:     General: Skin is warm and dry.      Capillary Refill: Capillary refill takes less than 2 seconds.   Neurological:      General: No focal deficit present.      Mental Status: He is alert. Mental status is at baseline.          Discussion with Family:  Caregiver updated..     Discharge instructions/Information to patient and family:   See after visit summary for information provided to patient and family.      Provisions for Follow-Up Care:  See after visit summary for information related to follow-up care and any pertinent home health orders.      Mobility at time of Discharge:   Basic Mobility Inpatient Raw Score: 19  JH-HLM Goal: 6: Walk 10 steps or more  JH-HLM Achieved: 2: Bed activities/Dependent transfer  HLM Goal NOT achieved. Continue to encourage mobility in post discharge setting.     Disposition:   Home    Planned Readmission: No     Discharge Statement:  I spent 45 minutes discharging the patient. This time was spent on the day of discharge. I had direct contact with the patient on the day of discharge. Greater than 50% of the total time was spent examining patient, answering all patient questions, arranging and discussing plan of care with patient as well as directly providing post-discharge instructions.  Additional time then spent on discharge activities.    Discharge Medications:  See after visit summary for reconciled discharge medications provided to patient and/or family.      **Please Note: This note may have been constructed using a voice recognition system

## 2024-04-01 NOTE — PLAN OF CARE
Problem: PAIN - ADULT  Goal: Verbalizes/displays adequate comfort level or baseline comfort level  Description: Interventions:  - Encourage patient to monitor pain and request assistance  - Assess pain using appropriate pain scale  - Administer analgesics based on type and severity of pain and evaluate response  - Implement non-pharmacological measures as appropriate and evaluate response  - Consider cultural and social influences on pain and pain management  - Notify physician/advanced practitioner if interventions unsuccessful or patient reports new pain  Outcome: Progressing     Problem: INFECTION - ADULT  Goal: Absence or prevention of progression during hospitalization  Description: INTERVENTIONS:  - Assess and monitor for signs and symptoms of infection  - Monitor lab/diagnostic results  - Monitor all insertion sites, i.e. indwelling lines, tubes, and drains  - Monitor endotracheal if appropriate and nasal secretions for changes in amount and color  - [place] appropriate cooling/warming therapies per order  - Administer medications as ordered  - Instruct and encourage patient and family to use good hand hygiene technique  - Identify and instruct in appropriate isolation precautions for identified infection/condition  Outcome: Progressing  Goal: Absence of fever/infection during neutropenic period  Description: INTERVENTIONS:  - Monitor WBC    Outcome: Progressing     Problem: SAFETY ADULT  Goal: Patient will remain free of falls  Description: INTERVENTIONS:  - Educate patient/family on patient safety including physical limitations  - Instruct patient to call for assistance with activity   - Consult OT/PT to assist with strengthening/mobility   - Keep Call bell within reach  - Keep bed low and locked with side rails adjusted as appropriate  - Keep care items and personal belongings within reach  - Initiate and maintain comfort rounds  - Make Fall Risk Sign visible to staff  - Offer Toileting every 4 Hours,  in advance of need  - Initiate/Maintain bed alarm  - Obtain necessary fall risk management equipment: yellow socks   - Apply yellow socks and bracelet for high fall risk patients  - Consider moving patient to room near nurses station  Outcome: Progressing  Goal: Maintain or return to baseline ADL function  Description: INTERVENTIONS:  -  Assess patient's ability to carry out ADLs; assess patient's baseline for ADL function and identify physical deficits which impact ability to perform ADLs (bathing, care of mouth/teeth, toileting, grooming, dressing, etc.)  - Assess/evaluate cause of self-care deficits   - Assess range of motion  - Assess patient's mobility; develop plan if impaired  - Assess patient's need for assistive devices and provide as appropriate  - Encourage maximum independence but intervene and supervise when necessary  - Involve family in performance of ADLs  - Assess for home care needs following discharge   - Consider OT consult to assist with ADL evaluation and planning for discharge  - Provide patient education as appropriate  Outcome: Progressing  Goal: Maintains/Returns to pre admission functional level  Description: INTERVENTIONS:  - Perform AM-PAC 6 Click Basic Mobility/ Daily Activity assessment daily.  - Set and communicate daily mobility goal to care team and patient/family/caregiver.   - Collaborate with rehabilitation services on mobility goals if consulted  - Perform Range of Motion 3 times a day.  - Reposition patient every 2 hours.  - Dangle patient 3 times a day  - Stand patient 3 times a day  - Ambulate patient 3 times a day  - Out of bed to chair 3 times a day   - Out of bed for meals 3 times a day  - Out of bed for toileting  - Record patient progress and toleration of activity level   Outcome: Progressing

## 2024-04-01 NOTE — PLAN OF CARE
Problem: PAIN - ADULT  Goal: Verbalizes/displays adequate comfort level or baseline comfort level  Description: Interventions:  - Encourage patient to monitor pain and request assistance  - Assess pain using appropriate pain scale  - Administer analgesics based on type and severity of pain and evaluate response  - Implement non-pharmacological measures as appropriate and evaluate response  - Consider cultural and social influences on pain and pain management  - Notify physician/advanced practitioner if interventions unsuccessful or patient reports new pain  Outcome: Progressing     Problem: INFECTION - ADULT  Goal: Absence or prevention of progression during hospitalization  Description: INTERVENTIONS:  - Assess and monitor for signs and symptoms of infection  - Monitor lab/diagnostic results  - Monitor all insertion sites, i.e. indwelling lines, tubes, and drains  - Monitor endotracheal if appropriate and nasal secretions for changes in amount and color  - [place] appropriate cooling/warming therapies per order  - Administer medications as ordered  - Instruct and encourage patient and family to use good hand hygiene technique  - Identify and instruct in appropriate isolation precautions for identified infection/condition  Outcome: Progressing  Goal: Absence of fever/infection during neutropenic period  Description: INTERVENTIONS:  - Monitor WBC    Outcome: Progressing     Problem: SAFETY ADULT  Goal: Patient will remain free of falls  Description: INTERVENTIONS:  - Educate patient/family on patient safety including physical limitations  - Instruct patient to call for assistance with activity   - Consult OT/PT to assist with strengthening/mobility   - Keep Call bell within reach  - Keep bed low and locked with side rails adjusted as appropriate  - Keep care items and personal belongings within reach  - Initiate and maintain comfort rounds  - Make Fall Risk Sign visible to staff  - Offer Toileting every 2 Hours,  in advance of need  - Initiate/Maintain bed alarm  - Obtain necessary fall risk management equipment: non-skid socks  - Apply yellow socks and bracelet for high fall risk patients  - Consider moving patient to room near nurses station  Outcome: Progressing  Goal: Maintain or return to baseline ADL function  Description: INTERVENTIONS:  -  Assess patient's ability to carry out ADLs; assess patient's baseline for ADL function and identify physical deficits which impact ability to perform ADLs (bathing, care of mouth/teeth, toileting, grooming, dressing, etc.)  - Assess/evaluate cause of self-care deficits   - Assess range of motion  - Assess patient's mobility; develop plan if impaired  - Assess patient's need for assistive devices and provide as appropriate  - Encourage maximum independence but intervene and supervise when necessary  - Involve family in performance of ADLs  - Assess for home care needs following discharge   - Consider OT consult to assist with ADL evaluation and planning for discharge  - Provide patient education as appropriate  Outcome: Progressing  Goal: Maintains/Returns to pre admission functional level  Description: INTERVENTIONS:  - Perform AM-PAC 6 Click Basic Mobility/ Daily Activity assessment daily.  - Set and communicate daily mobility goal to care team and patient/family/caregiver.   - Collaborate with rehabilitation services on mobility goals if consulted  - Perform Range of Motion 3 times a day.  - Reposition patient every 2 hours.  - Dangle patient 3 times a day  - Stand patient 2 times a day  - Ambulate patient 2 times a day  - Out of bed to chair 3 times a day   - Out of bed for meals 3 times a day  - Out of bed for toileting  - Record patient progress and toleration of activity level   Outcome: Progressing     Problem: DISCHARGE PLANNING  Goal: Discharge to home or other facility with appropriate resources  Description: INTERVENTIONS:  - Identify barriers to discharge w/patient and  caregiver  - Arrange for needed discharge resources and transportation as appropriate  - Identify discharge learning needs (meds, wound care, etc.)  - Arrange for interpretive services to assist at discharge as needed  - Refer to Case Management Department for coordinating discharge planning if the patient needs post-hospital services based on physician/advanced practitioner order or complex needs related to functional status, cognitive ability, or social support system  Outcome: Progressing     Problem: Knowledge Deficit  Goal: Patient/family/caregiver demonstrates understanding of disease process, treatment plan, medications, and discharge instructions  Description: Complete learning assessment and assess knowledge base.  Interventions:  - Provide teaching at level of understanding  - Provide teaching via preferred learning methods  Outcome: Progressing

## 2024-04-01 NOTE — CASE MANAGEMENT
Case Management Discharge Planning Note    Patient name Jairon Oconnell  Location /-01 MRN [id number]  : 1965 Date 2024       Current Admission Date: 3/1/2024  Current Admission Diagnosis:Ambulatory dysfunction   Patient Active Problem List    Diagnosis Date Noted    Urinary retention 2024    Nondisplaced fracture of greater trochanter of right femur, subsequent encounter for closed fracture with routine healing 2024    Closed nondisplaced fracture of proximal phalanx of left index finger with routine healing, subsequent encounter 10/30/2023    Pressure ulcer of sacral region, stage 3 (HCC) 2023    Ambulatory dysfunction 2022    Social problem 2021    Hypomagnesemia 2021    Thrombocytopathia ([facility]) 2020    Hyponatremia 2019    Metabolic encephalopathy 2019    Seborrheic dermatitis of scalp 2019    Nearsightedness 2019    Behavior concern in adult 2019    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HCC) 2019    Vitamin B12 deficiency 2017    Mixed hyperlipidemia 2016    Vitamin D deficiency 2016    Type 2 diabetes mellitus with diabetic neuropathy, without long-term current use of insulin ([facility]) 06/15/2016    Acquired hypothyroidism 06/15/2016    Cataract 2013    Ataxic cerebral palsy ([facility]) 2013    Generalized convulsive epilepsy ([facility]) 2013    Essential hypertension 2013    Osteoporosis 2013    Scoliosis 2013      LOS (days): 31  Geometric Mean LOS (GMLOS) (days): 3.9  Days to GMLOS:-27.6     OBJECTIVE:  Risk of Unplanned Readmission Score: 29.64         Current admission status: Inpatient   Preferred Pharmacy:   [facility] [place] - [address]  [address] [address]  Phone: [phone] Fax: [phone]    [id number] - [place] - [address] [address]  Phone:  [phone] Fax: [phone]    Primary Care Provider: HUNTER Brown    Primary Insurance: MEDICARE  Secondary Insurance: [place] [employer]    DISCHARGE DETAILS:    Discharge planning discussed with:: patient & Madeline & Keely at the bedside     Comments - Freedom of Choice: pt was accepted to a new Life sharing home with Keely Reece- she had cared for him in the past-  pt & sister were in agreement wiht the d/c & d/c plan  CM contacted family/caregiver?: Yes  Were Treatment Team discharge recommendations reviewed with patient/caregiver?: Yes  Did patient/caregiver verbalize understanding of patient care needs?: Yes  Were patient/caregiver advised of the risks associated with not following Treatment Team discharge recommendations?: Yes    Contacts  Patient Contacts: Madeline Riggs  Relationship to Patient:: Other (Comment) ([facility] & caregiver)  Contact Method: In Person  Reason/Outcome: Discharge Planning    Requested Home Health Care         Is the patient interested in HHC at discharge?: Yes    DME Referral Provided  Referral made for DME?: Yes  DME referral completed for the following items:: Bedside Commode  DME Supplier Name:: [employer]    Other Referral/Resources/Interventions Provided:  Interventions: HHC, DME  Referral Comments: pt accepted by [facility] -family & teams choice-[facility] was made aware of the d/c & avs was faxed to [phone]- permission was given to issue a commode from the consignment - Madeline Riggs were made aware the pt was issued a walker in 2012 and [facility] insurance will not pay for a new one- pt's walker is not at the hospital - Keely was given his shower chair & w/c but no walker- I made them aware I can order a walker from [employer] but it would be more expensive then ordering from [employer] or buying one from [employer] -  they will purchase one - pt's walker may be at [place] or at [facility]- they will check    Would you like to participate in our [facility]  service program?  : No - Declined    Treatment Team Recommendation: Home with Home Health Care (home with caregiver & [facility] & outpt follow up & [facility] - caregiver)  Discharge Destination Plan:: Home with Home Health Care (caregiver & [facility] & [facility]& outpt follow up - caregiver)  Transport at Discharge : Other (Comment) (caregiver)      Pt & family & staff are in agreement with the d/c & d/c plan                 Accompanied by: Other (Comment) (caregiver)     IMM Given (Date):: 04/01/24  IMM Given to:: Other (caregiver)

## 2024-04-02 ENCOUNTER — TELEPHONE (OUTPATIENT)
Dept: FAMILY MEDICINE CLINIC | Facility: CLINIC | Age: 59
End: 2024-04-02

## 2024-04-02 NOTE — TELEPHONE ENCOUNTER
Should he continue with the gabapentin 300 mg? His discharge papers say he should be on 400 mg gabapentin but the hospital never sent a different scrip to the pharmacy. He is scheduled for a TCM on Monday the 8th  with you

## 2024-04-02 NOTE — ASSESSMENT & PLAN NOTE
Lab Results   Component Value Date    HGBA1C 5.5 11/27/2023       Recent Labs     03/31/24  1602 03/31/24  2103 04/01/24  0719 04/01/24  1049   POCGLU 162* 230* 152* 150*         Blood Sugar Average: Last 72 hrs:  (P) 161.8    Continue insulin sliding scale  Resume metformin on discharge

## 2024-04-03 RX ORDER — HYDROXYZINE PAMOATE 25 MG/1
CAPSULE ORAL
COMMUNITY
Start: 2024-03-07

## 2024-04-08 ENCOUNTER — OFFICE VISIT (OUTPATIENT)
Dept: FAMILY MEDICINE CLINIC | Facility: CLINIC | Age: 59
End: 2024-04-08

## 2024-04-08 VITALS
WEIGHT: 172 LBS | HEART RATE: 84 BPM | DIASTOLIC BLOOD PRESSURE: 102 MMHG | SYSTOLIC BLOOD PRESSURE: 160 MMHG | TEMPERATURE: 97.4 F | BODY MASS INDEX: 23.3 KG/M2 | HEIGHT: 72 IN | OXYGEN SATURATION: 97 %

## 2024-04-08 DIAGNOSIS — J30.89 ENVIRONMENTAL AND SEASONAL ALLERGIES: ICD-10-CM

## 2024-04-08 DIAGNOSIS — Z76.89 ENCOUNTER FOR SUPPORT AND COORDINATION OF TRANSITION OF CARE: Primary | ICD-10-CM

## 2024-04-08 DIAGNOSIS — M25.551 RIGHT HIP PAIN: ICD-10-CM

## 2024-04-08 RX ORDER — LORATADINE 10 MG/1
10 TABLET ORAL DAILY
Qty: 90 TABLET | Refills: 3 | Status: SHIPPED | OUTPATIENT
Start: 2024-04-08

## 2024-04-08 RX ORDER — IBUPROFEN 600 MG/1
600 TABLET ORAL EVERY 6 HOURS PRN
Qty: 30 TABLET | Status: CANCELLED | OUTPATIENT
Start: 2024-04-08

## 2024-04-08 RX ORDER — IBUPROFEN 600 MG/1
600 TABLET ORAL EVERY 6 HOURS PRN
Qty: 90 TABLET | Refills: 0 | Status: SHIPPED | OUTPATIENT
Start: 2024-04-08

## 2024-04-08 NOTE — PROGRESS NOTES
Transition of Care  Follow-up After Hospitalization    Jairon Oconnell 58 y.o. male   Date:  4/8/2024    TCM Call     Date and time call was made  4/1/2024  1:38 PM    Hospital care reviewed  Records reviewed    Patient was hospitialized at  [facility]    Date of Admission  03/01/24    Date of discharge  04/01/24    Diagnosis  Ambulatory dysfunction    Disposition  Home    Were the patients medications reviewed and updated  Yes    Current Symptoms  Fatigue    Fatigue severity  Mild      TCM Call     Post hospital issues  None    Should patient be enrolled in anticoag monitoring?  No    Scheduled for follow up?  --  lvm to schedule a TCM visit.    Did you obtain your prescribed medications  Yes    Do you need help managing your prescriptions or medications  Yes    Is transportation to your appointment needed  Yes    I have advised the patient to call PCP with any new or worsening symptoms  Mary Brown    Living Arrangements  Family members    Support System  --  caregiver(group home)    Are you recieving any outpatient services  No    Are you recieving home care services  No    Types of home care services  Nurse visit    Are you using any community resources  Yes    Which resources are you recieving  WAIVER    Current waiver services  Yes    What services are you recieving  CAREGIVER    Have you fallen in the last 12 months  No    Interperter language line needed  No    Counseling  Caregiver          Hospital records were reviewed. Medications upon discharge reviewed/updated.   Medication Changes: none  Imaging: pelvis/hip  Consults: ortho  Follow up visits with other specialists: PCP/neurology/Urology      Assessment and Plan:    Jairon was seen today for transition of care management.    Diagnoses and all orders for this visit:    Encounter for support and coordination of transition of care    Right hip pain  -     ibuprofen (MOTRIN) 600 mg tablet; Take 1 tablet (600 mg total) by mouth every 6 (six)  hours as needed for mild pain    Environmental and seasonal allergies  -     loratadine (CLARITIN) 10 mg tablet; Take 1 tablet (10 mg total) by mouth daily            Jairon Oconnell present for TCM visit s/p hospitalization for right non-operable displaced hip.  Pt had been in hospital for 1 period of a month. PT/OT is helping with ROM and strength of Hip.     ROS: Review of Systems   All other systems reviewed and are negative.      Past Medical History:   Diagnosis Date   • ADRIANA (acute kidney injury) ([facility]) 9/24/2019   • Bacteremia due to Gram-positive bacteria 9/7/2021   • Buttock wound, left, subsequent encounter 11/5/2021   • COVID-19    • CP (cerebral palsy) ([facility])    • Depression    • Diabetes ([facility])    • Disease of thyroid gland    • Gait disorder    • Gluteal abscess 9/6/2021   • Hyperlipidemia    • Hypertension    • Kidney failure    • Kidney stones    • Moderate protein-calorie malnutrition ([facility]) 12/22/2021   • Osteoporosis    • Pressure injury of left buttock, stage 4 ([facility]) 3/9/2022   • Psychiatric disorder    • Scoliosis    • Seizures ([facility])    • Sepsis ([facility]) 9/25/2019   • Thyroid disease    • UTI (urinary tract infection)        Past Surgical History:   Procedure Laterality Date   • APPENDECTOMY     • IR PICC PLACEMENT SINGLE LUMEN  9/13/2021   • IR PICC PLACEMENT SINGLE LUMEN  9/16/2021       Social History     Socioeconomic History   • Marital status: Single     Spouse name: None   • Number of children: None   • Years of education: None   • Highest education level: None   Occupational History   • None   Tobacco Use   • Smoking status: Never   • Smokeless tobacco: Never   Vaping Use   • Vaping status: Never Used   Substance and Sexual Activity   • Alcohol use: Never   • Drug use: No   • Sexual activity: Not Currently   Other Topics Concern   • None   Social History Narrative   • None     Social Determinants of Health     Financial Resource Strain: Not on file   Food Insecurity: No Food Insecurity  (3/3/2024)    Hunger Vital Sign    • Worried About Running Out of Food in the Last Year: Never true    • Ran Out of Food in the Last Year: Never true   Transportation Needs: No Transportation Needs (3/3/2024)    PRAPARE - Transportation    • Lack of Transportation (Medical): No    • Lack of Transportation (Non-Medical): No   Physical Activity: Not on file   Stress: Not on file   Social Connections: Not on file   Intimate Partner Violence: Not on file   Housing Stability: Low Risk  (3/3/2024)    Housing Stability Vital Sign    • Unable to Pay for Housing in the Last Year: No    • Number of Places Lived in the Last Year: 1    • Unstable Housing in the Last Year: No       History reviewed. No pertinent family history.    Allergies   Allergen Reactions   • Depakote [Valproic Acid] Other (See Comments)     Must have brand name Depakote   • Erythromycin Other (See Comments)     Unknown per pt   • Penicillins Other (See Comments)     Unknown per pt         Current Outpatient Medications:   •  Depakote 500 MG DR tablet, Take 1 tab in the morning and 2 tabs at night. Brand necessary, Disp: 90 tablet, Rfl: 0  •  Depakote  MG 24 hr tablet, Take 1 tablet (250 mg total) by mouth daily Take in the morning with one 500 mg tablet (total morning dose of 750 mg). BRAND ONLY, Disp: 30 tablet, Rfl: 0  •  docusate sodium (COLACE) 100 mg capsule, Take 1 capsule (100 mg total) by mouth 2 (two) times a day as needed for constipation, Disp: 180 capsule, Rfl: 1  •  gabapentin (NEURONTIN) 400 mg capsule, Take 1 capsule (400 mg total) by mouth 3 (three) times a day, Disp: , Rfl:   •  hydrOXYzine pamoate (VISTARIL) 25 mg capsule, , Disp: , Rfl:   •  ibuprofen (MOTRIN) 600 mg tablet, Take 1 tablet (600 mg total) by mouth every 6 (six) hours as needed for mild pain, Disp: 90 tablet, Rfl: 0  •  lacosamide (VIMPAT) 150 mg tablet, Take 1 tab (150 mg) in the morning., Disp: 30 tablet, Rfl: 0  •  lacosamide (VIMPAT) 200 mg tablet, Take 1 tab  (200 mg) at night., Disp: 30 tablet, Rfl: 0  •  levETIRAcetam (KEPPRA) 750 mg tablet, GIVE 2 TABLETS BY MOUTH TWICE DAILY (=1500MG) FOR SEIZURE DISORDER DR. LIVE JONES, Disp: 120 tablet, Rfl: 11  •  levothyroxine 150 mcg tablet, Take 1 tablet (150 mcg total) by mouth daily, Disp: 90 tablet, Rfl: 3  •  lisinopril (ZESTRIL) 10 mg tablet, Take 1 tablet (10 mg total) by mouth daily, Disp: 90 tablet, Rfl: 1  •  loratadine (CLARITIN) 10 mg tablet, Take 1 tablet (10 mg total) by mouth daily, Disp: 90 tablet, Rfl: 3  •  metFORMIN (GLUCOPHAGE) 1000 MG tablet, GIVE 1 TABLET BY MOUTH TWICE DAILY WITH MEALS FOR DIABETES, Disp: 180 tablet, Rfl: 0  •  Multiple Vitamin (Daily-Reina) TABS, Take 1 tablet by mouth daily Take at 8 AM, Disp: 90 tablet, Rfl: 11  •  QUEtiapine (SEROquel XR) 150 mg 24 hr tablet, Take 1 tablet (150 mg total) by mouth 2 (two) times a day, Disp: 30 tablet, Rfl: 0  •  QUEtiapine (SEROquel XR) 300 mg 24 hr tablet, Take 1 tablet (300 mg total) by mouth daily at bedtime, Disp: 30 tablet, Rfl: 0  •  simvastatin (ZOCOR) 40 mg tablet, Take 1 tablet (40 mg total) by mouth daily at bedtime, Disp: 90 tablet, Rfl: 1  •  temazepam (RESTORIL) 30 mg capsule, Take 1 capsule (30 mg total) by mouth daily at bedtime for 10 days, Disp: 10 capsule, Rfl: 0  •  zonisamide (ZONEGRAN) 100 mg capsule, Take 3 capsules (300 mg total) by mouth daily at bedtime, Disp: 90 capsule, Rfl: 0  •  Blood Glucose Monitoring Suppl (ONETOUCH VERIO) w/Device KIT, by Does not apply route 3 (three) times a day TEST BLOOD SUGARS THREE TIMES (Patient not taking: Reported on 7/7/2023), Disp: 1 kit, Rfl: 0  •  Disposable Gloves (Safe-Sense Glove-Blk-Nitrl-L) MISC, Use 1 each 3 (three) times a day as needed (care taker assisting with soiled briefs) (Patient not taking: Reported on 9/28/2023), Disp: 100 each, Rfl: 1  •  Incontinence Supply Disposable (Briefs Overnight Medium) MISC, Use in the morning (Patient not taking: Reported on 9/28/2023), Disp: 20  each, Rfl: 11  •  OneTouch Delica Lancets 33G MISC, by Does not apply route daily TEST BLOOD SUGARS THREE TIMES DAILY (Patient not taking: Reported on 1/24/2024), Disp: 100 each, Rfl: 2      Physical Exam:  BP (!) 160/102   Pulse 84   Temp (!) 97.4 °F (36.3 °C) (Tympanic)   Ht 6' (1.829 m)   Wt 78 kg (172 lb)   SpO2 97%   BMI 23.33 kg/m²     Physical Exam  Vitals and nursing note reviewed.   Constitutional:       Appearance: Normal appearance. He is well-developed.   HENT:      Head: Normocephalic and atraumatic.      Right Ear: Tympanic membrane, ear canal and external ear normal.      Left Ear: Tympanic membrane, ear canal and external ear normal.      Nose: Nose normal.      Mouth/Throat:      Mouth: Mucous membranes are moist.      Pharynx: Uvula midline.   Eyes:      General: Lids are normal.      Conjunctiva/sclera: Conjunctivae normal.      Pupils: Pupils are equal, round, and reactive to light.   Neck:      Thyroid: No thyroid mass.      Vascular: No JVD.      Trachea: Trachea and phonation normal.   Cardiovascular:      Rate and Rhythm: Normal rate and regular rhythm.      Pulses: Normal pulses.      Heart sounds: Normal heart sounds, S1 normal and S2 normal. No murmur heard.     No friction rub. No gallop.   Pulmonary:      Effort: Pulmonary effort is normal.      Breath sounds: Normal breath sounds.   Abdominal:      General: Bowel sounds are normal.      Palpations: Abdomen is soft.      Tenderness: There is no abdominal tenderness.   Genitourinary:     Comments: Deferred  Musculoskeletal:         General: Normal range of motion.      Cervical back: Full passive range of motion without pain, normal range of motion and neck supple.      Right lower leg: No edema.      Left lower leg: No edema.   Lymphadenopathy:      Head:      Right side of head: No submental, submandibular, tonsillar, preauricular, posterior auricular or occipital adenopathy.      Left side of head: No submental, submandibular,  "tonsillar, preauricular, posterior auricular or occipital adenopathy.      Cervical: No cervical adenopathy.   Skin:     General: Skin is warm and dry.      Capillary Refill: Capillary refill takes less than 2 seconds.   Neurological:      General: No focal deficit present.      Mental Status: He is alert and oriented to person, place, and time.      Sensory: Sensation is intact.      Motor: Motor function is intact.      Coordination: Coordination is intact.      Gait: Gait is intact.   Psychiatric:         Attention and Perception: Attention and perception normal.         Mood and Affect: Mood and affect normal.         Speech: Speech normal.         Behavior: Behavior normal. Behavior is cooperative.         Thought Content: Thought content normal.         Cognition and Memory: Cognition normal.         Judgment: Judgment normal.             Labs:  Lab Results   Component Value Date    WBC 7.35 03/11/2024    HGB 13.0 03/11/2024    HCT 40.1 03/11/2024    MCV 98 03/11/2024     (L) 03/11/2024     Lab Results   Component Value Date    K 4.1 03/11/2024     03/11/2024    CO2 25 03/11/2024    BUN 34 (H) 03/11/2024    CREATININE 0.72 03/11/2024    GLUF 111 (H) 11/03/2023    CALCIUM 9.7 03/11/2024    CORRECTEDCA 9.3 12/21/2021    AST 17 02/27/2024    ALT 17 02/27/2024    ALKPHOS 66 02/27/2024    EGFR 104 03/11/2024             Portions of the record may have been created with voice recognition software. Occasional wrong word or \"sound a like\" substitutions may have occurred due to the inherent limitations of voice recognition software. Read the chart carefully and recognize, using context, where substitutions have occurred. Contact me with any questions.            "

## 2024-04-16 LAB
DME PARACHUTE DELIVERY DATE ACTUAL: NORMAL
DME PARACHUTE DELIVERY DATE REQUESTED: NORMAL
DME PARACHUTE ITEM DESCRIPTION: NORMAL
DME PARACHUTE ORDER STATUS: NORMAL
DME PARACHUTE SUPPLIER NAME: NORMAL
DME PARACHUTE SUPPLIER PHONE: NORMAL

## 2024-04-18 ENCOUNTER — TELEPHONE (OUTPATIENT)
Dept: NEUROLOGY | Facility: CLINIC | Age: 59
End: 2024-04-18

## 2024-04-22 DIAGNOSIS — E08.00 DIABETES MELLITUS DUE TO UNDERLYING CONDITION WITH HYPEROSMOLARITY WITHOUT COMA, WITHOUT LONG-TERM CURRENT USE OF INSULIN (HCC): ICD-10-CM

## 2024-04-22 DIAGNOSIS — E78.5 HYPERLIPIDEMIA, UNSPECIFIED HYPERLIPIDEMIA TYPE: ICD-10-CM

## 2024-04-22 DIAGNOSIS — I10 HYPERTENSION, UNSPECIFIED TYPE: ICD-10-CM

## 2024-04-22 RX ORDER — LISINOPRIL 10 MG/1
TABLET ORAL
Qty: 90 TABLET | Refills: 3 | Status: SHIPPED | OUTPATIENT
Start: 2024-04-22

## 2024-04-22 RX ORDER — SIMVASTATIN 40 MG
TABLET ORAL
Qty: 90 TABLET | Refills: 3 | Status: SHIPPED | OUTPATIENT
Start: 2024-04-22

## 2024-04-23 ENCOUNTER — TELEPHONE (OUTPATIENT)
Dept: NEUROLOGY | Facility: CLINIC | Age: 59
End: 2024-04-23

## 2024-04-24 ENCOUNTER — TELEPHONE (OUTPATIENT)
Dept: NEUROLOGY | Facility: CLINIC | Age: 59
End: 2024-04-24

## 2024-04-24 NOTE — TELEPHONE ENCOUNTER
Betty Huffman , pt new care giver , called to r/s appt. Pt was refusing to get out of bed today. Pt is r/s for 04/26/2024 w/  in [place] @ 230pm     Betty Number: [phone]

## 2024-04-24 NOTE — TELEPHONE ENCOUNTER
Jaylyn, care giver,  called and stated she is no longer his care taker and that he is with someone else. She is not sure if pt will still be attending but want to notify us.     Jaylyn was unable to provide a call back number since pt is not longer under her care.

## 2024-04-26 ENCOUNTER — OFFICE VISIT (OUTPATIENT)
Dept: NEUROLOGY | Facility: CLINIC | Age: 59
End: 2024-04-26
Payer: MEDICARE

## 2024-04-26 VITALS
DIASTOLIC BLOOD PRESSURE: 100 MMHG | BODY MASS INDEX: 23.46 KG/M2 | SYSTOLIC BLOOD PRESSURE: 150 MMHG | OXYGEN SATURATION: 99 % | WEIGHT: 173.2 LBS | HEART RATE: 62 BPM | TEMPERATURE: 97.9 F | HEIGHT: 72 IN

## 2024-04-26 DIAGNOSIS — G40.309 GENERALIZED CONVULSIVE EPILEPSY (HCC): Chronic | ICD-10-CM

## 2024-04-26 DIAGNOSIS — R25.9 INVOLUNTARY MOVEMENTS: ICD-10-CM

## 2024-04-26 DIAGNOSIS — F69 BEHAVIOR CONCERN IN ADULT: ICD-10-CM

## 2024-04-26 PROCEDURE — 99214 OFFICE O/P EST MOD 30 MIN: CPT | Performed by: PSYCHIATRY & NEUROLOGY

## 2024-04-26 PROCEDURE — G2211 COMPLEX E/M VISIT ADD ON: HCPCS | Performed by: PSYCHIATRY & NEUROLOGY

## 2024-04-26 RX ORDER — ZONISAMIDE 100 MG/1
300 CAPSULE ORAL
Qty: 90 CAPSULE | Refills: 11 | Status: SHIPPED | OUTPATIENT
Start: 2024-04-26

## 2024-04-26 RX ORDER — DIVALPROEX SODIUM 500 MG
TABLET, DELAYED RELEASE (ENTERIC COATED) ORAL
Qty: 90 TABLET | Refills: 11 | Status: SHIPPED | OUTPATIENT
Start: 2024-04-26

## 2024-04-26 RX ORDER — LACOSAMIDE 150 MG/1
TABLET ORAL
Qty: 30 TABLET | Refills: 5 | Status: SHIPPED | OUTPATIENT
Start: 2024-04-26

## 2024-04-26 RX ORDER — LEVETIRACETAM 750 MG/1
TABLET ORAL
Qty: 120 TABLET | Refills: 11 | Status: SHIPPED | OUTPATIENT
Start: 2024-04-26

## 2024-04-26 RX ORDER — LACOSAMIDE 200 MG/1
TABLET ORAL
Qty: 30 TABLET | Refills: 5 | Status: SHIPPED | OUTPATIENT
Start: 2024-04-26

## 2024-04-26 NOTE — PATIENT INSTRUCTIONS
-- continue his medications unchanged.     -- keep track of any additional seizures and how he is doing for his next appointment.

## 2024-04-26 NOTE — PROGRESS NOTES
Patient ID: Jairon Oconnell is a 58 y.o. male with cerebral palsy, generalized epilepsy, and behavioral disorder  , who is returning to Neurology office for follow up of his seizures.       Assessment/Plan:    Generalized convulsive epilepsy (HCC)  Overall, he does still have some occasional myoclonus, but has not had any larger seizures.  He previously was well-controlled on Depakote and zonisamide, and although we had planned on trying to wean off of levetiracetam and lacosamide at some point, because of his falls we have not been able to do this.  He did have a recent hip fracture, so I would recommend that he continue his medicines unchanged for now to avoid any potential falls as he still heals from this.  It would also be good to establish a new baseline with his current caregiver prior to making any changes.    --He will continue Depakote, lacosamide, levetiracetam, and zonisamide unchanged.        He will Return in about 4 months (around 8/26/2024).      Subjective:    HPI  Current seizure medications:  1. Zonisamide 300 mg nightly  2. Lacosamide 150 mg in the morning and 200 mg at night  3. Levetiracetam 1500 mg twice a day  4. Depakote (Brand) 500 mg in the morning and 1000 mg at night   Other medications as per Epic.    Since his last visit, he has not had any additional seizures.  He does still get some occasional myoclonus, but this is mainly been a momentary jerk like movement and has not led to any falls or other problems.    He had a mechanical fall in Feb, which resulted in a hip fracture.  He was hospitalized, and after recovering, did move to a different home where he is now living with a caregiver who had cared for him several years ago. His caregiver reports he has behaviorally been doing really good.  He feels that his behavior is better than it was when he previously cared for him.    Prior Seizure Medications: none other than current medications.     His history was also obtained from his  caregiver, who was present at today's visit.        Objective:    Blood pressure 150/100, pulse 62, temperature 97.9 °F (36.6 °C), temperature source Temporal, height 6' (1.829 m), weight 78.6 kg (173 lb 3.2 oz), SpO2 99%.    Physical Exam    Neurological Exam      ROS:    Review of Systems   Constitutional:  Negative for appetite change, fatigue and fever.   HENT: Negative.  Negative for hearing loss, tinnitus, trouble swallowing and voice change.    Eyes: Negative.  Negative for photophobia, pain and visual disturbance.   Respiratory: Negative.  Negative for shortness of breath.    Cardiovascular: Negative.  Negative for palpitations.   Gastrointestinal: Negative.  Negative for nausea and vomiting.   Endocrine: Negative.  Negative for cold intolerance.   Genitourinary: Negative.  Negative for dysuria, frequency and urgency.   Musculoskeletal:  Negative for back pain, gait problem, myalgias, neck pain and neck stiffness.   Skin: Negative.  Negative for rash.   Allergic/Immunologic: Negative.    Neurological: Negative.  Negative for dizziness, tremors, seizures, syncope, facial asymmetry, speech difficulty, weakness, light-headedness, numbness and headaches.   Hematological: Negative.  Does not bruise/bleed easily.   Psychiatric/Behavioral: Negative.  Negative for confusion, hallucinations and sleep disturbance.    All other systems reviewed and are negative.      I personally reviewed the ROS that was entered by the medical assistant    Voice recognition software was used in the generation of this note. There may be unintentional errors including grammatical errors, spelling errors, or pronoun errors.

## 2024-04-29 NOTE — ASSESSMENT & PLAN NOTE
Overall, he does still have some occasional myoclonus, but has not had any larger seizures.  He previously was well-controlled on Depakote and zonisamide, and although we had planned on trying to wean off of levetiracetam and lacosamide at some point, because of his falls we have not been able to do this.  He did have a recent hip fracture, so I would recommend that he continue his medicines unchanged for now to avoid any potential falls as he still heals from this.  It would also be good to establish a new baseline with his current caregiver prior to making any changes.    --He will continue Depakote, lacosamide, levetiracetam, and zonisamide unchanged.

## 2024-05-08 ENCOUNTER — OFFICE VISIT (OUTPATIENT)
Dept: FAMILY MEDICINE CLINIC | Facility: CLINIC | Age: 59
End: 2024-05-08
Payer: MEDICARE

## 2024-05-08 VITALS
WEIGHT: 175 LBS | TEMPERATURE: 97.7 F | OXYGEN SATURATION: 96 % | BODY MASS INDEX: 23.7 KG/M2 | DIASTOLIC BLOOD PRESSURE: 78 MMHG | HEART RATE: 80 BPM | HEIGHT: 72 IN | SYSTOLIC BLOOD PRESSURE: 126 MMHG

## 2024-05-08 DIAGNOSIS — E03.9 ACQUIRED HYPOTHYROIDISM: ICD-10-CM

## 2024-05-08 DIAGNOSIS — Z12.5 SCREENING FOR PROSTATE CANCER: ICD-10-CM

## 2024-05-08 DIAGNOSIS — Z00.00 ENCOUNTER FOR MEDICARE ANNUAL WELLNESS EXAM: ICD-10-CM

## 2024-05-08 DIAGNOSIS — G83.89 CEREBRAL PARESIS WITH HOMOLATERAL ATAXIA (HCC): ICD-10-CM

## 2024-05-08 DIAGNOSIS — E11.40 TYPE 2 DIABETES MELLITUS WITH DIABETIC NEUROPATHY, WITHOUT LONG-TERM CURRENT USE OF INSULIN (HCC): Primary | ICD-10-CM

## 2024-05-08 DIAGNOSIS — E78.2 MIXED HYPERLIPIDEMIA: ICD-10-CM

## 2024-05-08 DIAGNOSIS — R26.2 AMBULATORY DYSFUNCTION: ICD-10-CM

## 2024-05-08 DIAGNOSIS — I10 ESSENTIAL HYPERTENSION: Chronic | ICD-10-CM

## 2024-05-08 PROCEDURE — G0439 PPPS, SUBSEQ VISIT: HCPCS | Performed by: NURSE PRACTITIONER

## 2024-05-08 PROCEDURE — 99214 OFFICE O/P EST MOD 30 MIN: CPT | Performed by: NURSE PRACTITIONER

## 2024-05-08 RX ORDER — BENZTROPINE MESYLATE 0.5 MG/1
TABLET ORAL
COMMUNITY
Start: 2024-05-06

## 2024-05-08 NOTE — PATIENT INSTRUCTIONS
Medicare Preventive Visit Patient Instructions  Thank you for completing your Welcome to Medicare Visit or Medicare Annual Wellness Visit today. Your next wellness visit will be due in one year (5/9/2025).  The screening/preventive services that you may require over the next 5-10 years are detailed below. Some tests may not apply to you based off risk factors and/or age. Screening tests ordered at today's visit but not completed yet may show as past due. Also, please note that scanned in results may not display below.  Preventive Screenings:  Service Recommendations Previous Testing/Comments   Colorectal Cancer Screening  Colonoscopy    Fecal Occult Blood Test (FOBT)/Fecal Immunochemical Test (FIT)  Fecal DNA/Cologuard Test  Flexible Sigmoidoscopy Age: 45-75 years old   Colonoscopy: every 10 years (May be performed more frequently if at higher risk)  OR  FOBT/FIT: every 1 year  OR  Cologuard: every 3 years  OR  Sigmoidoscopy: every 5 years  Screening may be recommended earlier than age 45 if at higher risk for colorectal cancer. Also, an individualized decision between you and your healthcare provider will decide whether screening between the ages of 76-85 would be appropriate. Colonoscopy: Not on file  FOBT/FIT: Not on file  Cologuard: Not on file  Sigmoidoscopy: Not on file          Prostate Cancer Screening Individualized decision between patient and health care provider in men between ages of 55-69   Medicare will cover every 12 months beginning on the day after your 50th birthday PSA: 0.7 ng/mL           Hepatitis C Screening Once for adults born between 1945 and 1965  More frequently in patients at high risk for Hepatitis C Hep C Antibody: 07/07/2022    Screening Current   Diabetes Screening 1-2 times per year if you're at risk for diabetes or have pre-diabetes Fasting glucose: 111 mg/dL (11/3/2023)  A1C: 5.5 (11/27/2023)  Screening Not Indicated  History Diabetes   Cholesterol Screening Once every 5 years if  you don't have a lipid disorder. May order more often based on risk factors. Lipid panel: 11/09/2022  Screening Not Indicated  History Lipid Disorder      Other Preventive Screenings Covered by Medicare:  Abdominal Aortic Aneurysm (AAA) Screening: covered once if your at risk. You're considered to be at risk if you have a family history of AAA or a male between the age of 65-75 who smoking at least 100 cigarettes in your lifetime.  Lung Cancer Screening: covers low dose CT scan once per year if you meet all of the following conditions: (1) Age 55-77; (2) No signs or symptoms of lung cancer; (3) Current smoker or have quit smoking within the last 15 years; (4) You have a tobacco smoking history of at least 20 pack years (packs per day x number of years you smoked); (5) You get a written order from a healthcare provider.  Glaucoma Screening: covered annually if you're considered high risk: (1) You have diabetes OR (2) Family history of glaucoma OR (3)  aged 50 and older OR (4)  American aged 65 and older  Osteoporosis Screening: covered every 2 years if you meet one of the following conditions: (1) Have a vertebral abnormality; (2) On glucocorticoid therapy for more than 3 months; (3) Have primary hyperparathyroidism; (4) On osteoporosis medications and need to assess response to drug therapy.  HIV Screening: covered annually if you're between the age of 15-65. Also covered annually if you are younger than 15 and older than 65 with risk factors for HIV infection. For pregnant patients, it is covered up to 3 times per pregnancy.    Immunizations:  Immunization Recommendations   Influenza Vaccine Annual influenza vaccination during flu season is recommended for all persons aged >= 6 months who do not have contraindications   Pneumococcal Vaccine   * Pneumococcal conjugate vaccine = PCV13 (Prevnar 13), PCV15 (Vaxneuvance), PCV20 (Prevnar 20)  * Pneumococcal polysaccharide vaccine = PPSV23  (Pneumovax) Adults 19-63 yo with certain risk factors or if 65+ yo  If never received any pneumonia vaccine: recommend Prevnar 20 (PCV20)  Give PCV20 if previously received 1 dose of PCV13 or PPSV23   Hepatitis B Vaccine 3 dose series if at intermediate or high risk (ex: diabetes, end stage renal disease, liver disease)   Respiratory syncytial virus (RSV) Vaccine - COVERED BY MEDICARE PART D  * RSVPreF3 (Arexvy) CDC recommends that adults 60 years of age and older may receive a single dose of RSV vaccine using shared clinical decision-making (SCDM)   Tetanus (Td) Vaccine - COST NOT COVERED BY MEDICARE PART B Following completion of primary series, a booster dose should be given every 10 years to maintain immunity against tetanus. Td may also be given as tetanus wound prophylaxis.   Tdap Vaccine - COST NOT COVERED BY MEDICARE PART B Recommended at least once for all adults. For pregnant patients, recommended with each pregnancy.   Shingles Vaccine (Shingrix) - COST NOT COVERED BY MEDICARE PART B  2 shot series recommended in those 19 years and older who have or will have weakened immune systems or those 50 years and older     Health Maintenance Due:      Topic Date Due   • Colorectal Cancer Screening  Never done   • HIV Screening  Completed   • Hepatitis C Screening  Completed     Immunizations Due:      Topic Date Due   • COVID-19 Vaccine (6 - 2023-24 season) 12/28/2023     Advance Directives   What are advance directives?  Advance directives are legal documents that state your wishes and plans for medical care. These plans are made ahead of time in case you lose your ability to make decisions for yourself. Advance directives can apply to any medical decision, such as the treatments you want, and if you want to donate organs.   What are the types of advance directives?  There are many types of advance directives, and each state has rules about how to use them. You may choose a combination of any of the  following:  Living will:  This is a written record of the treatment you want. You can also choose which treatments you do not want, which to limit, and which to stop at a certain time. This includes surgery, medicine, IV fluid, and tube feedings.   Durable power of  for healthcare (DPAHC):  This is a written record that states who you want to make healthcare choices for you when you are unable to make them for yourself. This person, called a proxy, is usually a family member or a friend. You may choose more than 1 proxy.  Do not resuscitate (DNR) order:  A DNR order is used in case your heart stops beating or you stop breathing. It is a request not to have certain forms of treatment, such as CPR. A DNR order may be included in other types of advance directives.  Medical directive:  This covers the care that you want if you are in a coma, near death, or unable to make decisions for yourself. You can list the treatments you want for each condition. Treatment may include pain medicine, surgery, blood transfusions, dialysis, IV or tube feedings, and a ventilator (breathing machine).  Values history:  This document has questions about your views, beliefs, and how you feel and think about life. This information can help others choose the care that you would choose.  Why are advance directives important?  An advance directive helps you control your care. Although spoken wishes may be used, it is better to have your wishes written down. Spoken wishes can be misunderstood, or not followed. Treatments may be given even if you do not want them. An advance directive may make it easier for your family to make difficult choices about your care.   Narcotic (Opioid) Safety    Use narcotics safely:  Take prescribed narcotics exactly as directed  Do not give narcotics to others or take narcotics that belong to someone else  Do not mix narcotics without medicines or alcohol  Do not drive or operate heavy machinery after you  take the narcotic  Monitor for side effects and notify your healthcare provider if you experienced side effects such as nausea, sleepiness, itching, or trouble thinking clearly.    Manage constipation:    Constipation is the most common side effect of narcotic medicine. Constipation is when you have hard, dry bowel movements, or you go longer than usual between bowel movements. Tell your healthcare provider about all changes in your bowel movements while you are taking narcotics. He or she may recommend laxative medicine to help you have a bowel movement. He or she may also change the kind of narcotic you are taking, or change when you take it. The following are more ways you can prevent or relieve constipation:    Drink liquids as directed.  You may need to drink extra liquids to help soften and move your bowels. Ask how much liquid to drink each day and which liquids are best for you.  Eat high-fiber foods.  This may help decrease constipation by adding bulk to your bowel movements. High-fiber foods include fruits, vegetables, whole-grain breads and cereals, and beans. Your healthcare provider or dietitian can help you create a high-fiber meal plan. Your provider may also recommend a fiber supplement if you cannot get enough fiber from food.  Exercise regularly.  Regular physical activity can help stimulate your intestines. Walking is a good exercise to prevent or relieve constipation. Ask which exercises are best for you.  Schedule a time each day to have a bowel movement.  This may help train your body to have regular bowel movements. Bend forward while you are on the toilet to help move the bowel movement out. Sit on the toilet for at least 10 minutes, even if you do not have a bowel movement.    Store narcotics safely:   Store narcotics where others cannot easily get them.  Keep them in a locked cabinet or secure area. Do not  keep them in a purse or other bag you carry with you. A person may be looking for  something else and find the narcotics.  Make sure narcotics are stored out of the reach of children.  A child can easily overdose on narcotics. Narcotics may look like candy to a small child.    The best way to dispose of narcotics:      The laws vary by country and area. In the [place], the best way is to return the narcotics through a take-back program. This program is offered by the [place] Drug Enforcement Agency ([employer]NAUD). The following are options for using the program:  Take the narcotics to a A[employer] collection site.  The site is often a law enforcement center. Call your local law enforcement center for scheduled take-back days in your area. You will be given information on where to go if the collection site is in a different location.  Take the narcotics to an approved pharmacy or hospital.  A pharmacy or hospital may be set up as a collection site. You will need to ask if it is a A[employer] collection site if you were not directed there. A pharmacy or doctor's office may not be able to take back narcotics unless it is a [employer] site.  Use a mail-back system.  This means you are given containers to put the narcotics into. You will then mail them in the containers.  Use a take-back drop box.  This is a place to leave the narcotics at any time. People and animals will not be able to get into the box. Your local law enforcement agency can tell you where to find a drop box in your area.    Other ways to manage pain:   Ask your healthcare provider about non-narcotic medicines to control pain.  Nonprescription medicines include NSAIDs (such as ibuprofen) and acetaminophen. Prescription medicines include muscle relaxers, antidepressants, and steroids.  Pain may be managed without any medicines.  Some ways to relieve pain include massage, aromatherapy, or meditation. Physical or occupational therapy may also help.    For more information:   Drug Enforcement Administration  [address] [place] [address]  Phone: 1-  352 - 260-7528  Web Address: https://www.deadiversion.[facility].gov/drug_disposal/    [place] Food and Drug Administration  [address] [place] [address]  Phone: 1- 446 - 580-7424  Web Address: http://www.fda.gov     © Copyright [employer] 2018 Information is for End User's use only and may not be sold, redistributed or otherwise used for commercial purposes. All illustrations and images included in CareNotes® are the copyrighted property of [employer] or [employer]

## 2024-05-08 NOTE — PROGRESS NOTES
Assessment and Plan:     Problem List Items Addressed This Visit     Essential hypertension (Chronic)    Type 2 diabetes mellitus with diabetic neuropathy, without long-term current use of insulin (HCC) - Primary    Relevant Orders    Hemoglobin A1C    Albumin / creatinine urine ratio    Mixed hyperlipidemia    Relevant Orders    Lipid panel    Acquired hypothyroidism    Relevant Orders    TSH, 3rd generation    T4, free    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HCC)    Relevant Medications    benztropine (COGENTIN) 0.5 mg tablet    Ambulatory dysfunction   Other Visit Diagnoses     Screening for prostate cancer        Relevant Orders    PSA, Total Screen    Encounter for Medicare annual wellness exam              Depression Screening and Follow-up Plan: Patient was screened for depression during today's encounter. They screened negative with a PHQ-2 score of 0.      Preventive health issues were discussed with patient, and age appropriate screening tests were ordered as noted in patient's After Visit Summary.  Personalized health advice and appropriate referrals for health education or preventive services given if needed, as noted in patient's After Visit Summary.     History of Present Illness:     Patient presents for a Medicare Wellness Visit    Patient presents with:  Medicare Wellness Visit: Group home is requesting a PSA test.     Pt is due for routine serology for males 50-70 years of age with DM serology.   Pt accompanied by Caregiver, no interval/acute issues to discuss.     Hypertension  Pertinent negatives include no chest pain or shortness of breath. Agents associated with hypertension include thyroid hormones. Risk factors for coronary artery disease include diabetes mellitus, dyslipidemia and male gender. Past treatments include ACE inhibitors. There is no history of angina, kidney disease or CAD/MI. Identifiable causes of hypertension include a thyroid problem. There is no history of chronic  renal disease.   Hyperlipidemia  This is a chronic problem. Exacerbating diseases include diabetes and hypothyroidism. He has no history of chronic renal disease. There are no known factors aggravating his hyperlipidemia. Pertinent negatives include no chest pain, focal sensory loss, focal weakness, leg pain, myalgias or shortness of breath. Current antihyperlipidemic treatment includes statins and diet change. There are no compliance problems.  Risk factors for coronary artery disease include hypertension, male sex, dyslipidemia and diabetes mellitus.   Thyroid Problem  Presents for follow-up visit. The symptoms have been stable. His past medical history is significant for diabetes and hyperlipidemia.      Patient Care Team:  HUNTER Brown as PCP - General (Internal Medicine)  Keshav Mcknight MD as Surgeon (General Surgery)  John Barajas MD (Urology)     Review of Systems:     Review of Systems   Respiratory:  Negative for shortness of breath.    Cardiovascular:  Negative for chest pain.   Musculoskeletal:  Negative for myalgias.   Neurological:  Negative for focal weakness.   All other systems reviewed and are negative.       Problem List:     Patient Active Problem List   Diagnosis   • Cataract   • Ataxic cerebral palsy (HCC)   • Type 2 diabetes mellitus with diabetic neuropathy, without long-term current use of insulin (HCC)   • Generalized convulsive epilepsy (HCC)   • Mixed hyperlipidemia   • Essential hypertension   • Acquired hypothyroidism   • Osteoporosis   • Scoliosis   • Vitamin B12 deficiency   • Vitamin D deficiency   • Seborrheic dermatitis of scalp   • Nearsightedness   • Behavior concern in adult   • 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HCC)   • Metabolic encephalopathy   • Hyponatremia   • Thrombocytopathia (HCC)   • Hypomagnesemia   • Social problem   • Ambulatory dysfunction   • Pressure ulcer of sacral region, stage 3 (HCC)   • Closed nondisplaced fracture of proximal phalanx of  left index finger with routine healing, subsequent encounter   • Nondisplaced fracture of greater trochanter of right femur, subsequent encounter for closed fracture with routine healing   • Urinary retention      Past Medical and Surgical History:     Past Medical History:   Diagnosis Date   • ADRIANA (acute kidney injury) ([facility]) 9/24/2019   • Bacteremia due to Gram-positive bacteria 9/7/2021   • Buttock wound, left, subsequent encounter 11/5/2021   • COVID-19    • CP (cerebral palsy) ([facility])    • Depression    • Diabetes ([facility])    • Disease of thyroid gland    • Gait disorder    • Gluteal abscess 9/6/2021   • Hyperlipidemia    • Hypertension    • Kidney failure    • Kidney stones    • Moderate protein-calorie malnutrition ([facility]) 12/22/2021   • Osteoporosis    • Pressure injury of left buttock, stage 4 ([facility]) 3/9/2022   • Psychiatric disorder    • Scoliosis    • Seizures ([facility])    • Sepsis ([facility]) 9/25/2019   • Thyroid disease    • UTI (urinary tract infection)      Past Surgical History:   Procedure Laterality Date   • APPENDECTOMY     • IR PICC PLACEMENT SINGLE LUMEN  9/13/2021   • IR PICC PLACEMENT SINGLE LUMEN  9/16/2021      Family History:     History reviewed. No pertinent family history.   Social History:     Social History     Socioeconomic History   • Marital status: Single     Spouse name: None   • Number of children: None   • Years of education: None   • Highest education level: None   Occupational History   • None   Tobacco Use   • Smoking status: Never   • Smokeless tobacco: Never   Vaping Use   • Vaping status: Never Used   Substance and Sexual Activity   • Alcohol use: Never   • Drug use: No   • Sexual activity: Not Currently   Other Topics Concern   • None   Social History Narrative   • None     Social Determinants of Health     Financial Resource Strain: Not on file   Food Insecurity: No Food Insecurity (3/3/2024)    Hunger Vital Sign    • Worried About Running Out of Food in the Last Year: Never true    • Ran  Out of Food in the Last Year: Never true   Transportation Needs: No Transportation Needs (3/3/2024)    PRAPARE - Transportation    • Lack of Transportation (Medical): No    • Lack of Transportation (Non-Medical): No   Physical Activity: Not on file   Stress: Not on file   Social Connections: Not on file   Intimate Partner Violence: Not on file   Housing Stability: Low Risk  (3/3/2024)    Housing Stability Vital Sign    • Unable to Pay for Housing in the Last Year: No    • Number of Places Lived in the Last Year: 1    • Unstable Housing in the Last Year: No      Medications and Allergies:     Current Outpatient Medications   Medication Sig Dispense Refill   • benztropine (COGENTIN) 0.5 mg tablet      • Depakote 500 MG DR tablet Take 1 tab in the morning and 2 tabs at night. Brand necessary 90 tablet 11   • docusate sodium (COLACE) 100 mg capsule Take 1 capsule (100 mg total) by mouth 2 (two) times a day as needed for constipation 180 capsule 1   • gabapentin (NEURONTIN) 400 mg capsule Take 1 capsule (400 mg total) by mouth 3 (three) times a day     • lacosamide (VIMPAT) 150 mg tablet Take 1 tab (150 mg) in the morning. 30 tablet 5   • lacosamide (VIMPAT) 200 mg tablet Take 1 tab (200 mg) at night. 30 tablet 5   • levETIRAcetam (KEPPRA) 750 mg tablet Give 2 tabs (1500 mg) by mouth twice a day 120 tablet 11   • levothyroxine 150 mcg tablet Take 1 tablet (150 mcg total) by mouth daily 90 tablet 3   • lisinopril (ZESTRIL) 10 mg tablet GIVE 1 TABLET BY MOUTH DAILY FOR HYPERTENSION DR. RADU AVERY 90 tablet 3   • loratadine (CLARITIN) 10 mg tablet Take 1 tablet (10 mg total) by mouth daily 90 tablet 3   • metFORMIN (GLUCOPHAGE) 1000 MG tablet Take 1 tablet (1,000 mg total) by mouth 2 (two) times a day with meals 180 tablet 0   • Multiple Vitamin (Daily-Reina) TABS Take 1 tablet by mouth daily Take at 8 AM 90 tablet 11   • QUEtiapine (SEROquel XR) 150 mg 24 hr tablet Take 1 tablet (150 mg total) by mouth 2 (two) times a  day 30 tablet 0   • QUEtiapine (SEROquel XR) 300 mg 24 hr tablet Take 1 tablet (300 mg total) by mouth daily at bedtime 30 tablet 0   • simvastatin (ZOCOR) 40 mg tablet GIVE 1 TABLET BY MOUTH AT BEDTIME FOR CHOLESTEROL 90 tablet 3   • temazepam (RESTORIL) 30 mg capsule Take 1 capsule (30 mg total) by mouth daily at bedtime for 10 days 10 capsule 0   • zonisamide (ZONEGRAN) 100 mg capsule Take 3 capsules (300 mg total) by mouth daily at bedtime 90 capsule 11   • Blood Glucose Monitoring Suppl (ONETOUCH VERIO) w/Device KIT by Does not apply route 3 (three) times a day TEST BLOOD SUGARS THREE TIMES (Patient not taking: Reported on 7/7/2023) 1 kit 0   • Disposable Gloves (Safe-Sense Glove-Blk-Nitrl-L) MISC Use 1 each 3 (three) times a day as needed (care taker assisting with soiled briefs) (Patient not taking: Reported on 9/28/2023) 100 each 1   • hydrOXYzine pamoate (VISTARIL) 25 mg capsule  (Patient not taking: Reported on 4/26/2024)     • ibuprofen (MOTRIN) 600 mg tablet Take 1 tablet (600 mg total) by mouth every 6 (six) hours as needed for mild pain (Patient not taking: Reported on 4/26/2024) 90 tablet 0   • Incontinence Supply Disposable (Briefs Overnight Medium) MISC Use in the morning (Patient not taking: Reported on 9/28/2023) 20 each 11   • OneTouch Delica Lancets 33G MISC by Does not apply route daily TEST BLOOD SUGARS THREE TIMES DAILY (Patient not taking: Reported on 1/24/2024) 100 each 2     No current facility-administered medications for this visit.     Allergies   Allergen Reactions   • Depakote [Valproic Acid] Other (See Comments)     Must have brand name Depakote   • Erythromycin Other (See Comments)     Unknown per pt   • Penicillins Other (See Comments)     Unknown per pt      Immunizations:     Immunization History   Administered Date(s) Administered   • COVID-19 J&J (Ludy) vaccine 0.5 mL 08/10/2021, 08/11/2021   • COVID-19 MODERNA VACC 0.5 ML IM 02/03/2022   • COVID-19 Moderna Vac BIVALENT 12  Yr+ IM 0.5 ML 10/28/2022   • COVID-19 Moderna mRNA Vaccine 12 Yr+ 50 mcg/0.5 mL (Spikevax) 11/02/2023   • INFLUENZA 12/09/2015, 10/28/2022, 11/02/2023   • Influenza Quadrivalent Preservative Free 3 years and older IM 12/09/2015, 12/12/2017   • Influenza, injectable, quadrivalent, preservative free 0.5 mL 11/02/2023   • Influenza, recombinant, quadrivalent,injectable, preservative free 12/13/2018, 11/19/2019, 10/15/2020, 11/03/2021   • Influenza, seasonal, injectable 12/19/2016   • Pneumococcal Conjugate Vaccine 20-valent (Pcv20), Polysace 11/16/2022   • Pneumococcal Polysaccharide PPV23 12/12/2017   • Tdap 06/15/2017   • Tuberculin Skin Test-PPD Intradermal 12/19/2016, 03/08/2017, 03/14/2019, 03/22/2023      Health Maintenance:         Topic Date Due   • Colorectal Cancer Screening  Never done   • HIV Screening  Completed   • Hepatitis C Screening  Completed         Topic Date Due   • COVID-19 Vaccine (6 - 2023-24 season) 12/28/2023      Medicare Screening Tests and Risk Assessments:     Jairon is here for his Subsequent Wellness visit. Last Medicare Wellness visit information reviewed, patient interviewed and updates made to the record today.      Health Risk Assessment:   Patient rates overall health as good. Patient feels that their physical health rating is same. Patient is satisfied with their life. Eyesight was rated as same. Hearing was rated as same. Patient feels that their emotional and mental health rating is same. Patients states they are often angry. Patient states they are never, rarely unusually tired/fatigued. Pain experienced in the last 7 days has been none. Patient states that he has experienced no weight loss or gain in last 6 months.     Depression Screening:   PHQ-2 Score: 0  PHQ-9 Score: 0      Fall Risk Screening:   In the past year, patient has experienced: history of falling in past year    Number of falls: 2 or more  Injured during fall?: Yes    Feels unsteady when standing or walking?:  Yes    Worried about falling?: Yes      Home Safety:  Patient has trouble with stairs inside or outside of their home. Patient has working smoke alarms and has working carbon monoxide detector. Home safety hazards include: none.     Nutrition:   Current diet is Regular.     Medications:   Patient is currently taking over-the-counter supplements. OTC medications include: see medication list. Patient is not able to manage medications.     Activities of Daily Living (ADLs)/Instrumental Activities of Daily Living (IADLs):   Walk and transfer into and out of bed and chair?: No  Dress and groom yourself?: No    Bathe or shower yourself?: Yes    Feed yourself? Yes  Do your laundry/housekeeping?: No  Manage your money, pay your bills and track your expenses?: No  Make your own meals?: No    Do your own shopping?: No    ADL comments: Caregiver assist with ADL's    Previous Hospitalizations:   Any hospitalizations or ED visits within the last 12 months?: Yes    How many hospitalizations have you had in the last year?: 1-2    Advance Care Planning:   Living will: No    Durable POA for healthcare: Yes    Advanced directive: Yes      Cognitive Screening:   Provider or family/friend/caregiver concerned regarding cognition?: No    PREVENTIVE SCREENINGS      Cardiovascular Screening:    General: Screening Not Indicated and History Lipid Disorder      Diabetes Screening:     General: Screening Not Indicated and History Diabetes      Colorectal Cancer Screening:     General: Screening Not Indicated      Prostate Cancer Screening:    General: Risks and Benefits Discussed    Due for: PSA      Osteoporosis Screening:    General: Screening Not Indicated and History Osteoporosis      Abdominal Aortic Aneurysm (AAA) Screening:        General: Screening Not Indicated      Lung Cancer Screening:     General: Screening Not Indicated      Hepatitis C Screening:    General: Screening Current    Screening, Brief Intervention, and Referral to  Treatment (SBIRT)    Screening  Typical number of drinks in a day: 0  Typical number of drinks in a week: 0  Interpretation: Low risk drinking behavior.    Single Item Drug Screening:  How often have you used an illegal drug (including marijuana) or a prescription medication for non-medical reasons in the past year? never    Single Item Drug Screen Score: 0  Interpretation: Negative screen for possible drug use disorder    Review of Current Opioid Use    Opioid Risk Tool (ORT) Interpretation: Complete Opioid Risk Tool (ORT)    Other Counseling Topics:   Car/seat belt/driving safety, skin self-exam, sunscreen and calcium and vitamin D intake and regular weightbearing exercise.     No results found.     Physical Exam:     /78   Pulse 80   Temp 97.7 °F (36.5 °C) (Tympanic)   Ht 6' (1.829 m)   Wt 79.4 kg (175 lb)   SpO2 96%   BMI 23.73 kg/m²     Physical Exam  Vitals and nursing note reviewed.   Constitutional:       Appearance: Normal appearance. He is well-developed.   HENT:      Head: Normocephalic and atraumatic.      Right Ear: External ear normal.      Left Ear: External ear normal.      Nose: Nose normal.   Eyes:      Conjunctiva/sclera: Conjunctivae normal.      Pupils: Pupils are equal, round, and reactive to light.   Cardiovascular:      Rate and Rhythm: Normal rate and regular rhythm.      Heart sounds: Normal heart sounds.   Pulmonary:      Effort: Pulmonary effort is normal.      Breath sounds: Normal breath sounds.   Abdominal:      General: Bowel sounds are normal.      Palpations: Abdomen is soft.   Genitourinary:     Comments: Deferred  Musculoskeletal:         General: Normal range of motion.      Cervical back: Normal range of motion and neck supple.   Skin:     General: Skin is warm and dry.      Capillary Refill: Capillary refill takes less than 2 seconds.   Neurological:      Mental Status: He is alert and oriented to person, place, and time.   Psychiatric:         Behavior: Behavior  normal.         Thought Content: Thought content normal.         Judgment: Judgment normal.          HUNTER Brown

## 2024-05-20 ENCOUNTER — TELEPHONE (OUTPATIENT)
Age: 59
End: 2024-05-20

## 2024-05-20 DIAGNOSIS — E08.00 DIABETES MELLITUS DUE TO UNDERLYING CONDITION WITH HYPEROSMOLARITY WITHOUT COMA, WITHOUT LONG-TERM CURRENT USE OF INSULIN (HCC): ICD-10-CM

## 2024-05-20 NOTE — TELEPHONE ENCOUNTER
Patients caregiver called in to request physical paperwork to be refilled out by [facility]. Caregiver stated the entire form needs to be filled out with no blanks.   Form will be re faxed to office.   Last AWV 5/8/24    Please advise.   Thank you

## 2024-05-21 ENCOUNTER — TELEPHONE (OUTPATIENT)
Age: 59
End: 2024-05-21

## 2024-05-21 NOTE — TELEPHONE ENCOUNTER
"Patient's caregiver, Betty, is calling to request his Colace be re-ordered. It is currently written as a 2x/day, as needed. She states that it needs to be re-written without the \"as needed\".     Also, she stated that she does not have the kit to test Jairon's sugars. She is wondering if that is something that she should be doing daily or if that is only checked at his appointments.    Betty requests a call back at [phone] with further advise.   "

## 2024-05-22 DIAGNOSIS — E11.40 TYPE 2 DIABETES MELLITUS WITH DIABETIC NEUROPATHY, WITHOUT LONG-TERM CURRENT USE OF INSULIN (HCC): Primary | ICD-10-CM

## 2024-05-22 RX ORDER — BLOOD SUGAR DIAGNOSTIC
STRIP MISCELLANEOUS
Qty: 100 EACH | Refills: 3 | Status: SHIPPED | OUTPATIENT
Start: 2024-05-22

## 2024-05-22 RX ORDER — LANCETS 33 GAUGE
EACH MISCELLANEOUS
Qty: 100 EACH | Refills: 3 | Status: SHIPPED | OUTPATIENT
Start: 2024-05-22

## 2024-05-22 RX ORDER — BLOOD-GLUCOSE METER
KIT MISCELLANEOUS
Qty: 1 KIT | Refills: 0 | Status: SHIPPED | OUTPATIENT
Start: 2024-05-22

## 2024-05-22 NOTE — PROGRESS NOTES
Assessment/Plan:    Problem List Items Addressed This Visit    None       There are no diagnoses linked to this encounter.    No problem-specific Assessment & Plan notes found for this encounter.        Subjective:      Patient ID: Jairon Oconnell is a 59 y.o. male.    HPI    The following portions of the patient's history were reviewed and updated as appropriate:   He has a past medical history of ADRIANA (acute kidney injury) ([facility]) (9/24/2019), Bacteremia due to Gram-positive bacteria (9/7/2021), Buttock wound, left, subsequent encounter (11/5/2021), COVID-19, CP (cerebral palsy) ([facility]), Depression, Diabetes ([facility]), Disease of thyroid gland, Gait disorder, Gluteal abscess (9/6/2021), Hyperlipidemia, Hypertension, Kidney failure, Kidney stones, Moderate protein-calorie malnutrition ([facility]) (12/22/2021), Osteoporosis, Pressure injury of left buttock, stage 4 ([facility]) (3/9/2022), Psychiatric disorder, Scoliosis, Seizures ([facility]), Sepsis ([facility]) (9/25/2019), Thyroid disease, and UTI (urinary tract infection).,  does not have any pertinent problems on file.,   has a past surgical history that includes Appendectomy; IR PICC placement single lumen (9/13/2021); and IR PICC placement single lumen (9/16/2021).,  family history is not on file.,   reports that he has never smoked. He has never used smokeless tobacco. He reports that he does not drink alcohol and does not use drugs.,  is allergic to depakote [valproic acid], erythromycin, and penicillins..  Current Outpatient Medications   Medication Sig Dispense Refill   • benztropine (COGENTIN) 0.5 mg tablet      • Blood Glucose Monitoring Suppl (ONETOUCH VERIO) w/Device KIT by Does not apply route 3 (three) times a day TEST BLOOD SUGARS THREE TIMES (Patient not taking: Reported on 7/7/2023) 1 kit 0   • Depakote 500 MG DR tablet Take 1 tab in the morning and 2 tabs at night. Brand necessary 90 tablet 11   • Disposable Gloves (Safe-Sense Glove-Blk-Nitrl-L) MISC Use 1 each 3 (three) times a  day as needed (care taker assisting with soiled briefs) (Patient not taking: Reported on 9/28/2023) 100 each 1   • docusate sodium (COLACE) 100 mg capsule Take 1 capsule (100 mg total) by mouth 2 (two) times a day as needed for constipation 180 capsule 1   • gabapentin (NEURONTIN) 400 mg capsule Take 1 capsule (400 mg total) by mouth 3 (three) times a day     • hydrOXYzine pamoate (VISTARIL) 25 mg capsule  (Patient not taking: Reported on 4/26/2024)     • ibuprofen (MOTRIN) 600 mg tablet Take 1 tablet (600 mg total) by mouth every 6 (six) hours as needed for mild pain (Patient not taking: Reported on 4/26/2024) 90 tablet 0   • Incontinence Supply Disposable (Briefs Overnight Medium) MISC Use in the morning (Patient not taking: Reported on 9/28/2023) 20 each 11   • lacosamide (VIMPAT) 150 mg tablet Take 1 tab (150 mg) in the morning. 30 tablet 5   • lacosamide (VIMPAT) 200 mg tablet Take 1 tab (200 mg) at night. 30 tablet 5   • levETIRAcetam (KEPPRA) 750 mg tablet Give 2 tabs (1500 mg) by mouth twice a day 120 tablet 11   • levothyroxine 150 mcg tablet Take 1 tablet (150 mcg total) by mouth daily 90 tablet 3   • lisinopril (ZESTRIL) 10 mg tablet GIVE 1 TABLET BY MOUTH DAILY FOR HYPERTENSION DR. RADU AVERY 90 tablet 3   • loratadine (CLARITIN) 10 mg tablet Take 1 tablet (10 mg total) by mouth daily 90 tablet 3   • metFORMIN (GLUCOPHAGE) 1000 MG tablet GIVE 1 TABLET BY MOUTH TWICE DAILY WITH MEALS FOR DIABETES 180 tablet 0   • Multiple Vitamin (Daily-Reina) TABS Take 1 tablet by mouth daily Take at 8 AM 90 tablet 11   • OneTouch Delica Lancets 33G MISC by Does not apply route daily TEST BLOOD SUGARS THREE TIMES DAILY (Patient not taking: Reported on 1/24/2024) 100 each 2   • QUEtiapine (SEROquel XR) 150 mg 24 hr tablet Take 1 tablet (150 mg total) by mouth 2 (two) times a day 30 tablet 0   • QUEtiapine (SEROquel XR) 300 mg 24 hr tablet Take 1 tablet (300 mg total) by mouth daily at bedtime 30 tablet 0   • simvastatin  (ZOCOR) 40 mg tablet GIVE 1 TABLET BY MOUTH AT BEDTIME FOR CHOLESTEROL 90 tablet 3   • temazepam (RESTORIL) 30 mg capsule Take 1 capsule (30 mg total) by mouth daily at bedtime for 10 days 10 capsule 0   • zonisamide (ZONEGRAN) 100 mg capsule Take 3 capsules (300 mg total) by mouth daily at bedtime 90 capsule 11     No current facility-administered medications for this visit.       Review of Systems      Objective:  There were no vitals filed for this visit.  There is no height or weight on file to calculate BMI.     Physical Exam     PHQ-2/9 Depression Screening

## 2024-05-22 NOTE — TELEPHONE ENCOUNTER
He is on oral meds and should be checking his glucose once a day (fasting).  Does he need a glucometer, strips and lacets?

## 2024-05-29 DIAGNOSIS — K59.00 CONSTIPATION, UNSPECIFIED CONSTIPATION TYPE: ICD-10-CM

## 2024-05-29 RX ORDER — DOCUSATE SODIUM 100 MG/1
100 CAPSULE, LIQUID FILLED ORAL 2 TIMES DAILY
Qty: 180 CAPSULE | Refills: 1 | Status: ON HOLD | OUTPATIENT
Start: 2024-05-29

## 2024-06-03 ENCOUNTER — APPOINTMENT (EMERGENCY)
Dept: RADIOLOGY | Facility: HOSPITAL | Age: 59
DRG: 208 | End: 2024-06-03
Payer: MEDICARE

## 2024-06-03 ENCOUNTER — APPOINTMENT (EMERGENCY)
Dept: CT IMAGING | Facility: HOSPITAL | Age: 59
DRG: 208 | End: 2024-06-03
Payer: MEDICARE

## 2024-06-03 ENCOUNTER — HOSPITAL ENCOUNTER (INPATIENT)
Facility: HOSPITAL | Age: 59
LOS: 18 days | Discharge: HOME HEALTH | DRG: 208 | End: 2024-06-21
Attending: EMERGENCY MEDICINE | Admitting: ANESTHESIOLOGY
Payer: MEDICARE

## 2024-06-03 DIAGNOSIS — J96.02 ACUTE RESPIRATORY FAILURE WITH HYPOXIA AND HYPERCAPNIA (HCC): ICD-10-CM

## 2024-06-03 DIAGNOSIS — I46.9 CARDIAC ARREST (HCC): ICD-10-CM

## 2024-06-03 DIAGNOSIS — T68.XXXA HYPOTHERMIA, INITIAL ENCOUNTER: ICD-10-CM

## 2024-06-03 DIAGNOSIS — S27.0XXA CLOSED TRAUMATIC FRACTURE OF RIBS OF RIGHT SIDE WITH PNEUMOTHORAX: ICD-10-CM

## 2024-06-03 DIAGNOSIS — J96.01 ACUTE RESPIRATORY FAILURE WITH HYPOXIA AND HYPERCAPNIA (HCC): ICD-10-CM

## 2024-06-03 DIAGNOSIS — S22.41XA CLOSED TRAUMATIC FRACTURE OF RIBS OF RIGHT SIDE WITH PNEUMOTHORAX: ICD-10-CM

## 2024-06-03 DIAGNOSIS — T17.418A: ICD-10-CM

## 2024-06-03 DIAGNOSIS — G93.41 METABOLIC ENCEPHALOPATHY: ICD-10-CM

## 2024-06-03 DIAGNOSIS — I46.9 CARDIOPULMONARY ARREST (HCC): Primary | ICD-10-CM

## 2024-06-03 DIAGNOSIS — J90 PLEURAL EFFUSION: ICD-10-CM

## 2024-06-03 DIAGNOSIS — J93.9 PNEUMOTHORAX, RIGHT: ICD-10-CM

## 2024-06-03 DIAGNOSIS — F69 BEHAVIOR CONCERN IN ADULT: ICD-10-CM

## 2024-06-03 PROBLEM — E87.20 LACTIC ACIDOSIS: Status: ACTIVE | Noted: 2024-06-03

## 2024-06-03 PROBLEM — G93.40 ACUTE ENCEPHALOPATHY: Status: ACTIVE | Noted: 2024-06-03

## 2024-06-03 PROBLEM — R13.10 DYSPHAGIA: Status: ACTIVE | Noted: 2024-06-03

## 2024-06-03 LAB
2HR DELTA HS TROPONIN: 44 NG/L
ALBUMIN SERPL BCG-MCNC: 4 G/DL (ref 3.5–5)
ALP SERPL-CCNC: 54 U/L (ref 34–104)
ALT SERPL W P-5'-P-CCNC: 68 U/L (ref 7–52)
ANION GAP SERPL CALCULATED.3IONS-SCNC: 12 MMOL/L (ref 4–13)
APTT PPP: 27 SECONDS (ref 23–37)
AST SERPL W P-5'-P-CCNC: 59 U/L (ref 13–39)
BACTERIA UR QL AUTO: ABNORMAL /HPF
BASE EX.OXY STD BLDV CALC-SCNC: 90.7 % (ref 60–80)
BASE EXCESS BLDV CALC-SCNC: -10.1 MMOL/L
BASOPHILS # BLD AUTO: 0.06 THOUSANDS/ÂΜL (ref 0–0.1)
BASOPHILS NFR BLD AUTO: 1 % (ref 0–1)
BILIRUB SERPL-MCNC: 0.22 MG/DL (ref 0.2–1)
BILIRUB UR QL STRIP: NEGATIVE
BNP SERPL-MCNC: 9 PG/ML (ref 0–100)
BUN SERPL-MCNC: 29 MG/DL (ref 5–25)
CALCIUM SERPL-MCNC: 8.5 MG/DL (ref 8.4–10.2)
CARDIAC TROPONIN I PNL SERPL HS: 22 NG/L
CARDIAC TROPONIN I PNL SERPL HS: 66 NG/L
CHLORIDE SERPL-SCNC: 107 MMOL/L (ref 96–108)
CLARITY UR: ABNORMAL
CO2 SERPL-SCNC: 21 MMOL/L (ref 21–32)
COLOR UR: YELLOW
CREAT SERPL-MCNC: 0.89 MG/DL (ref 0.6–1.3)
EOSINOPHIL # BLD AUTO: 0.26 THOUSAND/ÂΜL (ref 0–0.61)
EOSINOPHIL NFR BLD AUTO: 3 % (ref 0–6)
ERYTHROCYTE [DISTWIDTH] IN BLOOD BY AUTOMATED COUNT: 13.3 % (ref 11.6–15.1)
GFR SERPL CREATININE-BSD FRML MDRD: 94 ML/MIN/1.73SQ M
GLUCOSE SERPL-MCNC: 168 MG/DL (ref 65–140)
GLUCOSE SERPL-MCNC: 197 MG/DL (ref 65–140)
GLUCOSE UR STRIP-MCNC: ABNORMAL MG/DL
GRAN CASTS #/AREA URNS LPF: ABNORMAL /[LPF]
HCO3 BLDV-SCNC: 18.5 MMOL/L (ref 24–30)
HCT VFR BLD AUTO: 42.2 % (ref 36.5–49.3)
HGB BLD-MCNC: 13.8 G/DL (ref 12–17)
HGB UR QL STRIP.AUTO: ABNORMAL
HYALINE CASTS #/AREA URNS LPF: ABNORMAL /LPF
IMM GRANULOCYTES # BLD AUTO: 0.17 THOUSAND/UL (ref 0–0.2)
IMM GRANULOCYTES NFR BLD AUTO: 2 % (ref 0–2)
INR PPP: 0.99 (ref 0.84–1.19)
KETONES UR STRIP-MCNC: NEGATIVE MG/DL
LACTATE SERPL-SCNC: 2.1 MMOL/L (ref 0.5–2)
LACTATE SERPL-SCNC: 7 MMOL/L (ref 0.5–2)
LEUKOCYTE ESTERASE UR QL STRIP: ABNORMAL
LYMPHOCYTES # BLD AUTO: 3.51 THOUSANDS/ÂΜL (ref 0.6–4.47)
LYMPHOCYTES NFR BLD AUTO: 34 % (ref 14–44)
MCH RBC QN AUTO: 32 PG (ref 26.8–34.3)
MCHC RBC AUTO-ENTMCNC: 32.7 G/DL (ref 31.4–37.4)
MCV RBC AUTO: 98 FL (ref 82–98)
MONOCYTES # BLD AUTO: 0.92 THOUSAND/ÂΜL (ref 0.17–1.22)
MONOCYTES NFR BLD AUTO: 9 % (ref 4–12)
MUCOUS THREADS UR QL AUTO: ABNORMAL
NEUTROPHILS # BLD AUTO: 5.48 THOUSANDS/ÂΜL (ref 1.85–7.62)
NEUTS SEG NFR BLD AUTO: 51 % (ref 43–75)
NITRITE UR QL STRIP: NEGATIVE
NON-SQ EPI CELLS URNS QL MICRO: ABNORMAL /HPF
NRBC BLD AUTO-RTO: 0 /100 WBCS
O2 CT BLDV-SCNC: 18.9 ML/DL
PCO2 BLDV: 50.8 MM HG (ref 42–50)
PH BLDV: 7.18 [PH] (ref 7.3–7.4)
PH UR STRIP.AUTO: 5.5 [PH]
PLATELET # BLD AUTO: 159 THOUSANDS/UL (ref 149–390)
PMV BLD AUTO: 11.8 FL (ref 8.9–12.7)
PO2 BLDV: 78.7 MM HG (ref 35–45)
POTASSIUM SERPL-SCNC: 3.6 MMOL/L (ref 3.5–5.3)
PROCALCITONIN SERPL-MCNC: <0.05 NG/ML
PROT SERPL-MCNC: 6.8 G/DL (ref 6.4–8.4)
PROT UR STRIP-MCNC: ABNORMAL MG/DL
PROTHROMBIN TIME: 13.7 SECONDS (ref 11.6–14.5)
RBC # BLD AUTO: 4.31 MILLION/UL (ref 3.88–5.62)
RBC #/AREA URNS AUTO: ABNORMAL /HPF
SODIUM SERPL-SCNC: 140 MMOL/L (ref 135–147)
SP GR UR STRIP.AUTO: 1.02 (ref 1–1.03)
UROBILINOGEN UR STRIP-ACNC: <2 MG/DL
WBC # BLD AUTO: 10.4 THOUSAND/UL (ref 4.31–10.16)
WBC #/AREA URNS AUTO: ABNORMAL /HPF

## 2024-06-03 PROCEDURE — 85025 COMPLETE CBC W/AUTO DIFF WBC: CPT | Performed by: EMERGENCY MEDICINE

## 2024-06-03 PROCEDURE — 96376 TX/PRO/DX INJ SAME DRUG ADON: CPT

## 2024-06-03 PROCEDURE — 71260 CT THORAX DX C+: CPT

## 2024-06-03 PROCEDURE — 85610 PROTHROMBIN TIME: CPT | Performed by: EMERGENCY MEDICINE

## 2024-06-03 PROCEDURE — 32556 INSERT CATH PLEURA W/O IMAGE: CPT | Performed by: EMERGENCY MEDICINE

## 2024-06-03 PROCEDURE — 82330 ASSAY OF CALCIUM: CPT

## 2024-06-03 PROCEDURE — 81001 URINALYSIS AUTO W/SCOPE: CPT | Performed by: EMERGENCY MEDICINE

## 2024-06-03 PROCEDURE — 80053 COMPREHEN METABOLIC PANEL: CPT | Performed by: EMERGENCY MEDICINE

## 2024-06-03 PROCEDURE — 84484 ASSAY OF TROPONIN QUANT: CPT | Performed by: EMERGENCY MEDICINE

## 2024-06-03 PROCEDURE — 93005 ELECTROCARDIOGRAM TRACING: CPT

## 2024-06-03 PROCEDURE — 84295 ASSAY OF SERUM SODIUM: CPT

## 2024-06-03 PROCEDURE — 96365 THER/PROPH/DIAG IV INF INIT: CPT

## 2024-06-03 PROCEDURE — 84145 PROCALCITONIN (PCT): CPT | Performed by: EMERGENCY MEDICINE

## 2024-06-03 PROCEDURE — 36415 COLL VENOUS BLD VENIPUNCTURE: CPT | Performed by: EMERGENCY MEDICINE

## 2024-06-03 PROCEDURE — 82948 REAGENT STRIP/BLOOD GLUCOSE: CPT

## 2024-06-03 PROCEDURE — 85730 THROMBOPLASTIN TIME PARTIAL: CPT | Performed by: EMERGENCY MEDICINE

## 2024-06-03 PROCEDURE — 82803 BLOOD GASES ANY COMBINATION: CPT

## 2024-06-03 PROCEDURE — 94002 VENT MGMT INPAT INIT DAY: CPT

## 2024-06-03 PROCEDURE — 83880 ASSAY OF NATRIURETIC PEPTIDE: CPT | Performed by: EMERGENCY MEDICINE

## 2024-06-03 PROCEDURE — 0W9930Z DRAINAGE OF RIGHT PLEURAL CAVITY WITH DRAINAGE DEVICE, PERCUTANEOUS APPROACH: ICD-10-PCS | Performed by: EMERGENCY MEDICINE

## 2024-06-03 PROCEDURE — 5A1935Z RESPIRATORY VENTILATION, LESS THAN 24 CONSECUTIVE HOURS: ICD-10-PCS | Performed by: ANESTHESIOLOGY

## 2024-06-03 PROCEDURE — 85014 HEMATOCRIT: CPT

## 2024-06-03 PROCEDURE — 70450 CT HEAD/BRAIN W/O DYE: CPT

## 2024-06-03 PROCEDURE — 96375 TX/PRO/DX INJ NEW DRUG ADDON: CPT

## 2024-06-03 PROCEDURE — 31500 INSERT EMERGENCY AIRWAY: CPT

## 2024-06-03 PROCEDURE — 99285 EMERGENCY DEPT VISIT HI MDM: CPT | Performed by: EMERGENCY MEDICINE

## 2024-06-03 PROCEDURE — 99285 EMERGENCY DEPT VISIT HI MDM: CPT

## 2024-06-03 PROCEDURE — 84132 ASSAY OF SERUM POTASSIUM: CPT

## 2024-06-03 PROCEDURE — 71045 X-RAY EXAM CHEST 1 VIEW: CPT

## 2024-06-03 PROCEDURE — 82805 BLOOD GASES W/O2 SATURATION: CPT | Performed by: EMERGENCY MEDICINE

## 2024-06-03 PROCEDURE — 82947 ASSAY GLUCOSE BLOOD QUANT: CPT

## 2024-06-03 PROCEDURE — 83605 ASSAY OF LACTIC ACID: CPT | Performed by: EMERGENCY MEDICINE

## 2024-06-03 PROCEDURE — 94760 N-INVAS EAR/PLS OXIMETRY 1: CPT

## 2024-06-03 PROCEDURE — 87040 BLOOD CULTURE FOR BACTERIA: CPT | Performed by: EMERGENCY MEDICINE

## 2024-06-03 PROCEDURE — 99223 1ST HOSP IP/OBS HIGH 75: CPT | Performed by: ANESTHESIOLOGY

## 2024-06-03 PROCEDURE — 87086 URINE CULTURE/COLONY COUNT: CPT | Performed by: EMERGENCY MEDICINE

## 2024-06-03 RX ORDER — FENTANYL CITRATE 50 UG/ML
2 INJECTION, SOLUTION INTRAMUSCULAR; INTRAVENOUS ONCE
Status: COMPLETED | OUTPATIENT
Start: 2024-06-03 | End: 2024-06-03

## 2024-06-03 RX ORDER — FENTANYL CITRATE 50 UG/ML
100 INJECTION, SOLUTION INTRAMUSCULAR; INTRAVENOUS ONCE
Status: COMPLETED | OUTPATIENT
Start: 2024-06-03 | End: 2024-06-03

## 2024-06-03 RX ORDER — MIDAZOLAM HYDROCHLORIDE 2 MG/2ML
5 INJECTION, SOLUTION INTRAMUSCULAR; INTRAVENOUS ONCE
Status: COMPLETED | OUTPATIENT
Start: 2024-06-03 | End: 2024-06-03

## 2024-06-03 RX ORDER — MIDAZOLAM HYDROCHLORIDE 2 MG/2ML
INJECTION, SOLUTION INTRAMUSCULAR; INTRAVENOUS
Status: DISPENSED
Start: 2024-06-03 | End: 2024-06-04

## 2024-06-03 RX ORDER — LIDOCAINE HCL/EPINEPHRINE/PF 2%-1:200K
10 VIAL (ML) INJECTION ONCE
Status: COMPLETED | OUTPATIENT
Start: 2024-06-03 | End: 2024-06-03

## 2024-06-03 RX ORDER — MIDAZOLAM HYDROCHLORIDE 1 MG/ML
10 INJECTION INTRAMUSCULAR; INTRAVENOUS ONCE
Status: DISCONTINUED | OUTPATIENT
Start: 2024-06-03 | End: 2024-06-03

## 2024-06-03 RX ORDER — MIDAZOLAM HYDROCHLORIDE 1 MG/ML
4 INJECTION INTRAMUSCULAR; INTRAVENOUS ONCE
Status: COMPLETED | OUTPATIENT
Start: 2024-06-03 | End: 2024-06-03

## 2024-06-03 RX ORDER — TEMAZEPAM 7.5 MG/1
15 CAPSULE ORAL
COMMUNITY
End: 2024-06-28

## 2024-06-03 RX ADMIN — MIDAZOLAM 5 MG: 1 INJECTION INTRAMUSCULAR; INTRAVENOUS at 20:28

## 2024-06-03 RX ADMIN — FENTANYL CITRATE 100 MCG: 50 INJECTION INTRAMUSCULAR; INTRAVENOUS at 20:33

## 2024-06-03 RX ADMIN — FENTANYL CITRATE 100 MCG: 50 INJECTION INTRAMUSCULAR; INTRAVENOUS at 21:55

## 2024-06-03 RX ADMIN — SODIUM CHLORIDE 1000 ML: 0.9 INJECTION, SOLUTION INTRAVENOUS at 22:01

## 2024-06-03 RX ADMIN — IOHEXOL 85 ML: 350 INJECTION, SOLUTION INTRAVENOUS at 21:51

## 2024-06-03 RX ADMIN — MIDAZOLAM 2 MG/HR: 5 INJECTION INTRAMUSCULAR; INTRAVENOUS at 22:19

## 2024-06-03 RX ADMIN — LIDOCAINE HYDROCHLORIDE,EPINEPHRINE BITARTRATE 10 ML: 20; .005 INJECTION, SOLUTION EPIDURAL; INFILTRATION; INTRACAUDAL; PERINEURAL at 20:34

## 2024-06-04 ENCOUNTER — APPOINTMENT (INPATIENT)
Dept: RADIOLOGY | Facility: HOSPITAL | Age: 59
DRG: 208 | End: 2024-06-04
Payer: MEDICARE

## 2024-06-04 ENCOUNTER — APPOINTMENT (INPATIENT)
Dept: NON INVASIVE DIAGNOSTICS | Facility: HOSPITAL | Age: 59
DRG: 208 | End: 2024-06-04
Payer: MEDICARE

## 2024-06-04 LAB
4HR DELTA HS TROPONIN: 102 NG/L
ALBUMIN SERPL BCG-MCNC: 3.2 G/DL (ref 3.5–5)
ALP SERPL-CCNC: 37 U/L (ref 34–104)
ALT SERPL W P-5'-P-CCNC: 48 U/L (ref 7–52)
ANION GAP SERPL CALCULATED.3IONS-SCNC: 7 MMOL/L (ref 4–13)
AORTIC ROOT: 3.7 CM
APICAL FOUR CHAMBER EJECTION FRACTION: 60 %
ASCENDING AORTA: 3.4 CM
AST SERPL W P-5'-P-CCNC: 29 U/L (ref 13–39)
ATRIAL RATE: 118 BPM
BACTERIA UR CULT: NORMAL
BASE EXCESS BLDA CALC-SCNC: -11 MMOL/L (ref -2–3)
BASOPHILS # BLD AUTO: 0.02 THOUSANDS/ÂΜL (ref 0–0.1)
BASOPHILS NFR BLD AUTO: 0 % (ref 0–1)
BILIRUB SERPL-MCNC: 0.28 MG/DL (ref 0.2–1)
BSA FOR ECHO PROCEDURE: 2.07 M2
BUN SERPL-MCNC: 28 MG/DL (ref 5–25)
CA-I BLD-SCNC: 1.23 MMOL/L (ref 1.12–1.32)
CALCIUM ALBUM COR SERPL-MCNC: 8.1 MG/DL (ref 8.3–10.1)
CALCIUM SERPL-MCNC: 7.5 MG/DL (ref 8.4–10.2)
CARDIAC TROPONIN I PNL SERPL HS: 104 NG/L (ref 8–18)
CARDIAC TROPONIN I PNL SERPL HS: 124 NG/L
CHLORIDE SERPL-SCNC: 112 MMOL/L (ref 96–108)
CO2 SERPL-SCNC: 22 MMOL/L (ref 21–32)
CREAT SERPL-MCNC: 0.63 MG/DL (ref 0.6–1.3)
E WAVE DECELERATION TIME: 253 MS
E/A RATIO: 1.08
EOSINOPHIL # BLD AUTO: 0 THOUSAND/ÂΜL (ref 0–0.61)
EOSINOPHIL NFR BLD AUTO: 0 % (ref 0–6)
ERYTHROCYTE [DISTWIDTH] IN BLOOD BY AUTOMATED COUNT: 13.6 % (ref 11.6–15.1)
FRACTIONAL SHORTENING: 27 (ref 28–44)
GFR SERPL CREATININE-BSD FRML MDRD: 109 ML/MIN/1.73SQ M
GLUCOSE SERPL-MCNC: 132 MG/DL (ref 65–140)
GLUCOSE SERPL-MCNC: 140 MG/DL (ref 65–140)
GLUCOSE SERPL-MCNC: 144 MG/DL (ref 65–140)
GLUCOSE SERPL-MCNC: 152 MG/DL (ref 65–140)
GLUCOSE SERPL-MCNC: 160 MG/DL (ref 65–140)
GLUCOSE SERPL-MCNC: 200 MG/DL (ref 65–140)
GLUCOSE SERPL-MCNC: 203 MG/DL (ref 65–140)
HCO3 BLDA-SCNC: 18.4 MMOL/L (ref 24–30)
HCT VFR BLD AUTO: 38.2 % (ref 36.5–49.3)
HCT VFR BLD CALC: 42 % (ref 36.5–49.3)
HGB BLD-MCNC: 12.5 G/DL (ref 12–17)
HGB BLDA-MCNC: 14.3 G/DL (ref 12–17)
IMM GRANULOCYTES # BLD AUTO: 0.04 THOUSAND/UL (ref 0–0.2)
IMM GRANULOCYTES NFR BLD AUTO: 1 % (ref 0–2)
INTERVENTRICULAR SEPTUM IN DIASTOLE (PARASTERNAL SHORT AXIS VIEW): 1.2 CM
INTERVENTRICULAR SEPTUM: 1.2 CM (ref 0.6–1.1)
LA/AORTA RATIO 2D: 0.7
LAAS-AP2: 11.3 CM2
LAAS-AP4: 11.6 CM2
LEFT ATRIUM SIZE: 2.6 CM
LEFT INTERNAL DIMENSION IN SYSTOLE: 3.3 CM (ref 2.1–4)
LEFT VENTRICULAR INTERNAL DIMENSION IN DIASTOLE: 4.5 CM (ref 3.5–6)
LEFT VENTRICULAR POSTERIOR WALL IN END DIASTOLE: 1.2 CM
LEFT VENTRICULAR STROKE VOLUME: 48 ML
LVSV (TEICH): 48 ML
LYMPHOCYTES # BLD AUTO: 0.6 THOUSANDS/ÂΜL (ref 0.6–4.47)
LYMPHOCYTES NFR BLD AUTO: 8 % (ref 14–44)
MAGNESIUM SERPL-MCNC: 1.3 MG/DL (ref 1.9–2.7)
MCH RBC QN AUTO: 31.5 PG (ref 26.8–34.3)
MCHC RBC AUTO-ENTMCNC: 32.7 G/DL (ref 31.4–37.4)
MCV RBC AUTO: 96 FL (ref 82–98)
MONOCYTES # BLD AUTO: 1.41 THOUSAND/ÂΜL (ref 0.17–1.22)
MONOCYTES NFR BLD AUTO: 20 % (ref 4–12)
MV E'TISSUE VEL-SEP: 6 CM/S
MV PEAK A VEL: 0.48 M/S
MV PEAK E VEL: 52 CM/S
MV STENOSIS PRESSURE HALF TIME: 74 MS
MV VALVE AREA P 1/2 METHOD: 2.97
NEUTROPHILS # BLD AUTO: 5.07 THOUSANDS/ÂΜL (ref 1.85–7.62)
NEUTS SEG NFR BLD AUTO: 71 % (ref 43–75)
NRBC BLD AUTO-RTO: 0 /100 WBCS
P AXIS: 92 DEGREES
PCO2 BLD: 20 MMOL/L (ref 21–32)
PCO2 BLD: 54.2 MM HG (ref 42–50)
PH BLD: 7.14 [PH] (ref 7.3–7.4)
PHOSPHATE SERPL-MCNC: 3.2 MG/DL (ref 2.7–4.5)
PLATELET # BLD AUTO: 133 THOUSANDS/UL (ref 149–390)
PMV BLD AUTO: 10.8 FL (ref 8.9–12.7)
PO2 BLD: 51 MM HG (ref 35–45)
POTASSIUM BLD-SCNC: 3.1 MMOL/L (ref 3.5–5.3)
POTASSIUM SERPL-SCNC: 4.4 MMOL/L (ref 3.5–5.3)
PR INTERVAL: 150 MS
PROT SERPL-MCNC: 5.4 G/DL (ref 6.4–8.4)
QRS AXIS: 102 DEGREES
QRSD INTERVAL: 168 MS
QT INTERVAL: 342 MS
QTC INTERVAL: 479 MS
RBC # BLD AUTO: 3.97 MILLION/UL (ref 3.88–5.62)
RIGHT VENTRICLE ID DIMENSION: 3.2 CM
SAO2 % BLD FROM PO2: 74 % (ref 60–85)
SL CV LV EF: 55
SL CV PED ECHO LEFT VENTRICLE DIASTOLIC VOLUME (MOD BIPLANE) 2D: 90 ML
SL CV PED ECHO LEFT VENTRICLE SYSTOLIC VOLUME (MOD BIPLANE) 2D: 43 ML
SODIUM BLD-SCNC: 143 MMOL/L (ref 136–145)
SODIUM SERPL-SCNC: 141 MMOL/L (ref 135–147)
SPECIMEN SOURCE: ABNORMAL
T WAVE AXIS: 81 DEGREES
TR MAX PG: 13 MMHG
TR PEAK VELOCITY: 1.8 M/S
TRICUSPID ANNULAR PLANE SYSTOLIC EXCURSION: 1.2 CM
TRICUSPID VALVE PEAK REGURGITATION VELOCITY: 1.78 M/S
VENTRICULAR RATE: 118 BPM
WBC # BLD AUTO: 7.14 THOUSAND/UL (ref 4.31–10.16)

## 2024-06-04 PROCEDURE — 94003 VENT MGMT INPAT SUBQ DAY: CPT

## 2024-06-04 PROCEDURE — 84484 ASSAY OF TROPONIN QUANT: CPT | Performed by: NURSE PRACTITIONER

## 2024-06-04 PROCEDURE — 94760 N-INVAS EAR/PLS OXIMETRY 1: CPT

## 2024-06-04 PROCEDURE — C8929 TTE W OR WO FOL WCON,DOPPLER: HCPCS

## 2024-06-04 PROCEDURE — 94660 CPAP INITIATION&MGMT: CPT

## 2024-06-04 PROCEDURE — 80053 COMPREHEN METABOLIC PANEL: CPT | Performed by: NURSE PRACTITIONER

## 2024-06-04 PROCEDURE — 82948 REAGENT STRIP/BLOOD GLUCOSE: CPT

## 2024-06-04 PROCEDURE — 83735 ASSAY OF MAGNESIUM: CPT | Performed by: NURSE PRACTITIONER

## 2024-06-04 PROCEDURE — 84100 ASSAY OF PHOSPHORUS: CPT | Performed by: NURSE PRACTITIONER

## 2024-06-04 PROCEDURE — 71045 X-RAY EXAM CHEST 1 VIEW: CPT

## 2024-06-04 PROCEDURE — 85025 COMPLETE CBC W/AUTO DIFF WBC: CPT | Performed by: NURSE PRACTITIONER

## 2024-06-04 PROCEDURE — 99291 CRITICAL CARE FIRST HOUR: CPT | Performed by: ANESTHESIOLOGY

## 2024-06-04 PROCEDURE — 93306 TTE W/DOPPLER COMPLETE: CPT | Performed by: INTERNAL MEDICINE

## 2024-06-04 PROCEDURE — 87081 CULTURE SCREEN ONLY: CPT | Performed by: NURSE PRACTITIONER

## 2024-06-04 PROCEDURE — 93010 ELECTROCARDIOGRAM REPORT: CPT | Performed by: INTERNAL MEDICINE

## 2024-06-04 RX ORDER — ACETAMINOPHEN 160 MG/5ML
650 SUSPENSION ORAL EVERY 6 HOURS
Status: DISCONTINUED | OUTPATIENT
Start: 2024-06-04 | End: 2024-06-04

## 2024-06-04 RX ORDER — INSULIN LISPRO 100 [IU]/ML
1-6 INJECTION, SOLUTION INTRAVENOUS; SUBCUTANEOUS EVERY 6 HOURS SCHEDULED
Status: DISCONTINUED | OUTPATIENT
Start: 2024-06-04 | End: 2024-06-08

## 2024-06-04 RX ORDER — GABAPENTIN 400 MG/1
400 CAPSULE ORAL 3 TIMES DAILY
Status: DISCONTINUED | OUTPATIENT
Start: 2024-06-04 | End: 2024-06-21 | Stop reason: HOSPADM

## 2024-06-04 RX ORDER — PRAVASTATIN SODIUM 80 MG/1
80 TABLET ORAL
Status: DISCONTINUED | OUTPATIENT
Start: 2024-06-04 | End: 2024-06-21 | Stop reason: HOSPADM

## 2024-06-04 RX ORDER — LACOSAMIDE 10 MG/ML
150 INJECTION, SOLUTION INTRAVENOUS
Status: CANCELLED | OUTPATIENT
Start: 2024-06-04

## 2024-06-04 RX ORDER — LEVETIRACETAM 500 MG/1
1500 TABLET ORAL EVERY 12 HOURS SCHEDULED
Status: DISCONTINUED | OUTPATIENT
Start: 2024-06-04 | End: 2024-06-21 | Stop reason: HOSPADM

## 2024-06-04 RX ORDER — DEXMEDETOMIDINE HYDROCHLORIDE 4 UG/ML
.1-1.4 INJECTION, SOLUTION INTRAVENOUS
Status: DISCONTINUED | OUTPATIENT
Start: 2024-06-04 | End: 2024-06-04

## 2024-06-04 RX ORDER — KETOROLAC TROMETHAMINE 30 MG/ML
15 INJECTION, SOLUTION INTRAMUSCULAR; INTRAVENOUS EVERY 6 HOURS SCHEDULED
Status: COMPLETED | OUTPATIENT
Start: 2024-06-04 | End: 2024-06-05

## 2024-06-04 RX ORDER — FENTANYL CITRATE-0.9 % NACL/PF 10 MCG/ML
50 PLASTIC BAG, INJECTION (ML) INTRAVENOUS CONTINUOUS
Status: DISCONTINUED | OUTPATIENT
Start: 2024-06-04 | End: 2024-06-04

## 2024-06-04 RX ORDER — AMOXICILLIN 250 MG
2 CAPSULE ORAL 2 TIMES DAILY
Status: DISCONTINUED | OUTPATIENT
Start: 2024-06-04 | End: 2024-06-21 | Stop reason: HOSPADM

## 2024-06-04 RX ORDER — FENTANYL CITRATE 50 UG/ML
50 INJECTION, SOLUTION INTRAMUSCULAR; INTRAVENOUS
Status: DISCONTINUED | OUTPATIENT
Start: 2024-06-04 | End: 2024-06-04

## 2024-06-04 RX ORDER — OXYCODONE HCL 5 MG/5 ML
10 SOLUTION, ORAL ORAL EVERY 6 HOURS PRN
Status: DISCONTINUED | OUTPATIENT
Start: 2024-06-04 | End: 2024-06-08

## 2024-06-04 RX ORDER — ENOXAPARIN SODIUM 100 MG/ML
40 INJECTION SUBCUTANEOUS DAILY
Status: DISCONTINUED | OUTPATIENT
Start: 2024-06-04 | End: 2024-06-09

## 2024-06-04 RX ORDER — ACETAMINOPHEN 160 MG/5ML
975 SUSPENSION ORAL EVERY 8 HOURS
Status: DISCONTINUED | OUTPATIENT
Start: 2024-06-04 | End: 2024-06-04

## 2024-06-04 RX ORDER — ACETAMINOPHEN 10 MG/ML
1000 INJECTION, SOLUTION INTRAVENOUS EVERY 8 HOURS
Status: DISCONTINUED | OUTPATIENT
Start: 2024-06-04 | End: 2024-06-08

## 2024-06-04 RX ORDER — FENTANYL CITRATE 50 UG/ML
100 INJECTION, SOLUTION INTRAMUSCULAR; INTRAVENOUS ONCE
Status: COMPLETED | OUTPATIENT
Start: 2024-06-04 | End: 2024-06-04

## 2024-06-04 RX ORDER — ZONISAMIDE 100 MG/1
300 CAPSULE ORAL
Status: DISCONTINUED | OUTPATIENT
Start: 2024-06-04 | End: 2024-06-21 | Stop reason: HOSPADM

## 2024-06-04 RX ORDER — OXYCODONE HCL 5 MG/5 ML
5 SOLUTION, ORAL ORAL EVERY 6 HOURS PRN
Status: DISCONTINUED | OUTPATIENT
Start: 2024-06-04 | End: 2024-06-08

## 2024-06-04 RX ORDER — LIDOCAINE 50 MG/G
2 PATCH TOPICAL DAILY
Status: DISCONTINUED | OUTPATIENT
Start: 2024-06-04 | End: 2024-06-21 | Stop reason: HOSPADM

## 2024-06-04 RX ORDER — CHLORHEXIDINE GLUCONATE ORAL RINSE 1.2 MG/ML
15 SOLUTION DENTAL EVERY 12 HOURS SCHEDULED
Status: DISCONTINUED | OUTPATIENT
Start: 2024-06-04 | End: 2024-06-18

## 2024-06-04 RX ORDER — SODIUM CHLORIDE, SODIUM GLUCONATE, SODIUM ACETATE, POTASSIUM CHLORIDE, MAGNESIUM CHLORIDE, SODIUM PHOSPHATE, DIBASIC, AND POTASSIUM PHOSPHATE .53; .5; .37; .037; .03; .012; .00082 G/100ML; G/100ML; G/100ML; G/100ML; G/100ML; G/100ML; G/100ML
125 INJECTION, SOLUTION INTRAVENOUS CONTINUOUS
Status: DISCONTINUED | OUTPATIENT
Start: 2024-06-04 | End: 2024-06-07

## 2024-06-04 RX ORDER — ONDANSETRON 2 MG/ML
4 INJECTION INTRAMUSCULAR; INTRAVENOUS EVERY 6 HOURS PRN
Status: DISCONTINUED | OUTPATIENT
Start: 2024-06-04 | End: 2024-06-21 | Stop reason: HOSPADM

## 2024-06-04 RX ORDER — LORATADINE 10 MG/1
10 TABLET ORAL DAILY
Status: DISCONTINUED | OUTPATIENT
Start: 2024-06-04 | End: 2024-06-21 | Stop reason: HOSPADM

## 2024-06-04 RX ORDER — ALBUMIN, HUMAN INJ 5% 5 %
12.5 SOLUTION INTRAVENOUS ONCE
Status: COMPLETED | OUTPATIENT
Start: 2024-06-04 | End: 2024-06-04

## 2024-06-04 RX ORDER — MAGNESIUM SULFATE HEPTAHYDRATE 40 MG/ML
2 INJECTION, SOLUTION INTRAVENOUS ONCE
Status: COMPLETED | OUTPATIENT
Start: 2024-06-04 | End: 2024-06-04

## 2024-06-04 RX ORDER — LACOSAMIDE 10 MG/ML
200 INJECTION, SOLUTION INTRAVENOUS DAILY
Status: CANCELLED | OUTPATIENT
Start: 2024-06-05

## 2024-06-04 RX ORDER — LEVOTHYROXINE SODIUM 0.15 MG/1
150 TABLET ORAL DAILY
Status: DISCONTINUED | OUTPATIENT
Start: 2024-06-04 | End: 2024-06-21 | Stop reason: HOSPADM

## 2024-06-04 RX ORDER — LEVETIRACETAM 500 MG/5ML
1500 INJECTION, SOLUTION, CONCENTRATE INTRAVENOUS EVERY 12 HOURS SCHEDULED
Status: CANCELLED | OUTPATIENT
Start: 2024-06-04

## 2024-06-04 RX ORDER — LACOSAMIDE 100 MG/1
200 TABLET ORAL
Status: DISCONTINUED | OUTPATIENT
Start: 2024-06-04 | End: 2024-06-21 | Stop reason: HOSPADM

## 2024-06-04 RX ADMIN — Medication 50 MCG/HR: at 03:10

## 2024-06-04 RX ADMIN — LEVOTHYROXINE SODIUM 150 MCG: 0.15 TABLET ORAL at 08:02

## 2024-06-04 RX ADMIN — SODIUM CHLORIDE, SODIUM GLUCONATE, SODIUM ACETATE, POTASSIUM CHLORIDE, MAGNESIUM CHLORIDE, SODIUM PHOSPHATE, DIBASIC, AND POTASSIUM PHOSPHATE 125 ML/HR: .53; .5; .37; .037; .03; .012; .00082 INJECTION, SOLUTION INTRAVENOUS at 19:06

## 2024-06-04 RX ADMIN — DEXMEDETOMIDINE HYDROCHLORIDE 1.4 MCG/KG/HR: 400 INJECTION INTRAVENOUS at 04:58

## 2024-06-04 RX ADMIN — PERFLUTREN 2 ML/MIN: 6.52 INJECTION, SUSPENSION INTRAVENOUS at 09:11

## 2024-06-04 RX ADMIN — ZONISAMIDE 300 MG: 100 CAPSULE ORAL at 21:29

## 2024-06-04 RX ADMIN — LACOSAMIDE 200 MG: 100 TABLET, FILM COATED ORAL at 21:35

## 2024-06-04 RX ADMIN — LORATADINE 10 MG: 10 TABLET ORAL at 08:01

## 2024-06-04 RX ADMIN — LEVETIRACETAM 1500 MG: 500 TABLET, FILM COATED ORAL at 08:19

## 2024-06-04 RX ADMIN — ACETAMINOPHEN 1000 MG: 10 INJECTION INTRAVENOUS at 20:13

## 2024-06-04 RX ADMIN — LIDOCAINE 5% 2 PATCH: 700 PATCH TOPICAL at 01:14

## 2024-06-04 RX ADMIN — LACOSAMIDE 200 MG: 100 TABLET, FILM COATED ORAL at 01:13

## 2024-06-04 RX ADMIN — ALBUMIN (HUMAN) 12.5 G: 12.5 INJECTION, SOLUTION INTRAVENOUS at 14:25

## 2024-06-04 RX ADMIN — ACETAMINOPHEN 650 MG: 650 SUSPENSION ORAL at 05:56

## 2024-06-04 RX ADMIN — DEXMEDETOMIDINE HYDROCHLORIDE 0.8 MCG/KG/HR: 400 INJECTION INTRAVENOUS at 10:34

## 2024-06-04 RX ADMIN — CHLORHEXIDINE GLUCONATE 0.12% ORAL RINSE 15 ML: 1.2 LIQUID ORAL at 20:13

## 2024-06-04 RX ADMIN — FENTANYL CITRATE 100 MCG: 50 INJECTION INTRAMUSCULAR; INTRAVENOUS at 03:10

## 2024-06-04 RX ADMIN — DEXMEDETOMIDINE HYDROCHLORIDE 0.3 MCG/KG/HR: 400 INJECTION INTRAVENOUS at 00:21

## 2024-06-04 RX ADMIN — Medication 20 MG: at 08:01

## 2024-06-04 RX ADMIN — GABAPENTIN 400 MG: 400 CAPSULE ORAL at 20:25

## 2024-06-04 RX ADMIN — LACOSAMIDE 150 MG: 100 TABLET, FILM COATED ORAL at 08:02

## 2024-06-04 RX ADMIN — GABAPENTIN 400 MG: 400 CAPSULE ORAL at 08:05

## 2024-06-04 RX ADMIN — INSULIN LISPRO 1 UNITS: 100 INJECTION, SOLUTION INTRAVENOUS; SUBCUTANEOUS at 06:37

## 2024-06-04 RX ADMIN — SENNOSIDES AND DOCUSATE SODIUM 2 TABLET: 50; 8.6 TABLET ORAL at 08:01

## 2024-06-04 RX ADMIN — CHLORHEXIDINE GLUCONATE 0.12% ORAL RINSE 15 ML: 1.2 LIQUID ORAL at 08:05

## 2024-06-04 RX ADMIN — ACETAMINOPHEN 650 MG: 650 SUSPENSION ORAL at 01:13

## 2024-06-04 RX ADMIN — GABAPENTIN 400 MG: 400 CAPSULE ORAL at 01:13

## 2024-06-04 RX ADMIN — LEVETIRACETAM 1500 MG: 500 TABLET, FILM COATED ORAL at 01:13

## 2024-06-04 RX ADMIN — SODIUM CHLORIDE, SODIUM GLUCONATE, SODIUM ACETATE, POTASSIUM CHLORIDE, MAGNESIUM CHLORIDE, SODIUM PHOSPHATE, DIBASIC, AND POTASSIUM PHOSPHATE 125 ML/HR: .53; .5; .37; .037; .03; .012; .00082 INJECTION, SOLUTION INTRAVENOUS at 01:14

## 2024-06-04 RX ADMIN — MAGNESIUM SULFATE HEPTAHYDRATE 2 G: 40 INJECTION, SOLUTION INTRAVENOUS at 08:01

## 2024-06-04 RX ADMIN — KETOROLAC TROMETHAMINE 15 MG: 30 INJECTION, SOLUTION INTRAMUSCULAR; INTRAVENOUS at 20:08

## 2024-06-04 RX ADMIN — ENOXAPARIN SODIUM 40 MG: 40 INJECTION SUBCUTANEOUS at 08:01

## 2024-06-04 RX ADMIN — ACETAMINOPHEN 975 MG: 650 SUSPENSION ORAL at 12:36

## 2024-06-04 RX ADMIN — ZONISAMIDE 300 MG: 100 CAPSULE ORAL at 01:15

## 2024-06-04 RX ADMIN — LEVETIRACETAM 1500 MG: 500 TABLET, FILM COATED ORAL at 20:21

## 2024-06-04 RX ADMIN — CHLORHEXIDINE GLUCONATE 0.12% ORAL RINSE 15 ML: 1.2 LIQUID ORAL at 01:13

## 2024-06-04 RX ADMIN — FENTANYL CITRATE 100 MCG: 50 INJECTION INTRAMUSCULAR; INTRAVENOUS at 00:20

## 2024-06-04 RX ADMIN — INSULIN LISPRO 2 UNITS: 100 INJECTION, SOLUTION INTRAVENOUS; SUBCUTANEOUS at 12:39

## 2024-06-04 NOTE — ED NOTES
Report given, patient is off to the unit, vented, on a monitor, on versed at 4ml/hr, accompanied by a nurse, lucian zamora and RT.      Donna Wallace RN  06/03/24 2691

## 2024-06-04 NOTE — RESPIRATORY THERAPY NOTE
RT Ventilator Management Note  Jairon Oconnell 59 y.o. male MRN: [id number]  Unit/Bed#: ICU 10 Encounter: [id number]      Daily Screen         6/4/2024  0506 6/4/2024  0727          Patient safety screen outcome:: Failed Failed      Not Ready for Weaning due to:: Underline problem not resolved Underline problem not resolved                Physical Exam:   Assessment Type: Assess only  General Appearance: Sedated  Respiratory Pattern: Assisted  Chest Assessment: Chest expansion symmetrical  Bilateral Breath Sounds: Diminished, Coarse  Suction: ET Tube  O2 Device: opti  Subjective Data: sleeping      Resp Comments: increased settings to 100%/60L         06/04/24 1356   Respiratory Assessment   Resp Comments increased settings to 100%/60L   Non-Invasive Information   SpO2 (!) 88 %   $ Pulse Oximetry Spot Check Charge Completed   Non-Invasive Settings   FiO2 (%) 100   Flow (lpm) 60   Temperature (Set) 31   Non-Invasive Readings   Skin Intervention Skin intact   Heater Temperature (Obs) 30.3

## 2024-06-04 NOTE — RESPIRATORY THERAPY NOTE
RT Ventilator Management Note  Jairon Oconnell 59 y.o. male MRN: [id number]  Unit/Bed#: ICU 10 Encounter: [id number]      Daily Screen         6/3/2024  2044 6/4/2024  0506          Patient safety screen outcome:: Failed Failed      Not Ready for Weaning due to:: Underline problem not resolved Underline problem not resolved               06/04/24 0506   Respiratory Assessment   Assessment Type Assess only   General Appearance Sedated   Respiratory Pattern Assisted   Chest Assessment Chest expansion symmetrical   Bilateral Breath Sounds Diminished;Coarse   Suction ET Tube   O2 Device vent   Vent Information   Vent type Drager   Drager Vent Mode AC/VC+   $ Vent Daily Charge-Subsequent Yes   $ Pulse Oximetry Spot Check Charge Completed   SpO2 98 %   AC/VC+ Settings   Resp Rate (BPM) 18 BPM   VT (mL) 450 mL   Insp Time (S) 0.9 S   FIO2 (%) (S)  50 %   PEEP (cmH2O) (S)  8 cmH2O   Rise Time (%) 20 %   Trigger Sensitivity Flow (LPM) 2 LPM   Humidification Heater   Heater Temp 98.6 °F (37 °C)   AC/VC+ Actuals   Resp Rate (BPM) 18 BPM   VT (mL) 453 mL   MV (Obs) 8.2   MAP (cmH2O) 12 cmH2O   Peak Pressure (cmH2O) 23 cmH2O   I:E Ratio (Obs) 1:2.7   Static Compliance (mL/cmH20) 33.3 mL/cmH2O   Plateau Pressure (cm H2O) 22 cm H2O   Heater Temperature (Obs) 98.6 °F (37 °C)   AC/VC+ ALARMS   High Peak Pressure (cmH2O) 45 cmH2O   High Resp Rate (BPM) 30 BPM   High MV (L/min) 14 L/min   Low MV (L/min) 3 L/min   High VT (mL) 900 mL   Maintenance   Alarm (pink) cable attached No   Resuscitation bag with peep valve at bedside Yes   Water bag changed No   Circuit changed No   Daily Screen   Patient safety screen outcome: Failed   Not Ready for Weaning due to: Underline problem not resolved   ETT  Cuffed 7.5 mm   Placement Date: 06/03/24   Previously placed by?: EMS  Type: Cuffed  Tube Size: 7.5 mm  Placement Verification: Auscultation;Chest x-ray  Secured at (cm): 26   Secured at (cm) 26   Measured from Teeth   Secured Location Right    Repositioned Right to Center   Secured by Commercial tube ceballos   Cuff Pressure (color) Green   HI-LO Suction    (na)         Physical Exam:   Assessment Type: Assess only  General Appearance: Sedated  Respiratory Pattern: Assisted  Chest Assessment: Chest expansion symmetrical  Bilateral Breath Sounds: Diminished, Coarse  Cough: None  Suction: ET Tube  O2 Device: vent

## 2024-06-04 NOTE — H&P
[facility]  H&P  Name: Jairon Oconnell 59 y.o. male I MRN: [id number]  Unit/Bed#: TR 30 I Date of Admission: 6/3/2024   Date of Service: 6/3/2024 I Hospital Day: 0      Assessment & Plan   Acute respiratory failure with hypoxia and hypercapnia (HCC)  Assessment & Plan  Continue mechanical ventilation pending neuro status  Can consider fentanyl/Precedex for ventilatory comfort  Serial ABGs  Respiratory protocol    Closed traumatic fracture of ribs of right side with pneumothorax  Assessment & Plan  S/p 10 Fr right chest tube  Continue suction   Pain control- rib fracture protocol    Dysphagia  Assessment & Plan  Speech consult when liberated    Acute encephalopathy  Assessment & Plan  Pending CT head  Re-assess patient on arrival to ICU  Avoid sedating medications as able    Lactic acidosis  Assessment & Plan  Generous volume resuscitation  Trend q2 for now    Ambulatory dysfunction  Assessment & Plan  PT/OT when appropriate  Fall precautions    Behavior concern in adult  Assessment & Plan  Hold home Seroquel for now    Acquired hypothyroidism  Assessment & Plan  Continue home levothyroxine  Check TSH with reflex T4    Generalized convulsive epilepsy (HCC)  Assessment & Plan  Continue home medications  If no improvement in neuro exam when sedation from ER wears off, low threshold for spot EEG versus vEEG    * Cardiac arrest (HCC)  Assessment & Plan  Thought to be secondary to hypoxia secondary to airway obstruction  Trend troponin  Check ECHO post arrest  Re-assess mental status to determine if patient would benefit from targeted temperature management  Patient already cool           History of Present Illness     HPI: Jairon Oconnell is a 59 y.o. who presents on 6/3 after a cardiac arrest following an episode of choking. He has a past medical history of known seizure disorder, behavioral problems, hypertension, ambulatory dysfunction, hyperlipidemia, and hypothyroidism. The Heimlich maneuver  was attempted without success following the choking and the bystander CPR initiated. He experienced return of spontaneous circulation on arrival to the emergency room. His imaging was notable for a large right pneumothorax due to chest compressions and he underwent placement of a chest tube with sedation. He is being referred to the critical care unit for further monitoring.    History obtained from chart review and unobtainable from patient due to mental status.  Review of Systems: Review of Systems   Unable to perform ROS: Intubated     Disposition: Critical care  Historical Information   Past Medical History:  9/24/2019: ADRIANA (acute kidney injury) ([facility])  9/7/2021: Bacteremia due to Gram-positive bacteria  11/5/2021: Buttock wound, left, subsequent encounter  No date: COVID-19  No date: CP (cerebral palsy) ([facility])  No date: Depression  No date: Diabetes ([facility])  No date: Disease of thyroid gland  No date: Gait disorder  9/6/2021: Gluteal abscess  No date: Hyperlipidemia  No date: Hypertension  No date: Kidney failure  No date: Kidney stones  12/22/2021: Moderate protein-calorie malnutrition ([facility])  No date: Osteoporosis  3/9/2022: Pressure injury of left buttock, stage 4 ([facility])  No date: Psychiatric disorder  No date: Scoliosis  No date: Seizures ([facility])  9/25/2019: Sepsis ([facility])  No date: Thyroid disease  No date: UTI (urinary tract infection) Past Surgical History:  No date: APPENDECTOMY  9/13/2021: IR PICC PLACEMENT SINGLE LUMEN  9/16/2021: IR PICC PLACEMENT SINGLE LUMEN   Current Outpatient Medications   Medication Instructions    benztropine (COGENTIN) 0.5 mg tablet     Blood Glucose Monitoring Suppl (OneTouch Verio Reflect) w/Device KIT Check blood sugars once daily. Please substitute with appropriate alternative as covered by patient's insurance. Dx: E11.65    Blood Glucose Monitoring Suppl (ONETOUCH VERIO) w/Device KIT Does not apply, 3 times daily, TEST BLOOD SUGARS THREE TIMES    Depakote 500 MG DR tablet Take 1  tab in the morning and 2 tabs at night. Brand necessary    Disposable Gloves (Safe-Sense Glove-Blk-Nitrl-L) MISC 1 each, Does not apply, 3 times daily PRN    docusate sodium (COLACE) 100 mg, Oral, 2 times daily    gabapentin (NEURONTIN) 400 mg, Oral, 3 times daily    glucose blood (OneTouch Verio) test strip Check blood sugars once daily. Please substitute with appropriate alternative as covered by patient's insurance. Dx: E11.65    hydrOXYzine pamoate (VISTARIL) 25 mg capsule     ibuprofen (MOTRIN) 600 mg, Oral, Every 6 hours PRN    Incontinence Supply Disposable (Briefs Overnight Medium) MISC Does not apply, Daily    lacosamide (VIMPAT) 150 mg tablet Take 1 tab (150 mg) in the morning.    lacosamide (VIMPAT) 200 mg tablet Take 1 tab (200 mg) at night.    levETIRAcetam (KEPPRA) 750 mg tablet Give 2 tabs (1500 mg) by mouth twice a day    levothyroxine 150 mcg, Oral, Daily    lisinopril (ZESTRIL) 10 mg tablet GIVE 1 TABLET BY MOUTH DAILY FOR HYPERTENSION DR. RADU AVERY    loratadine (CLARITIN) 10 mg, Oral, Daily    metFORMIN (GLUCOPHAGE) 1000 MG tablet GIVE 1 TABLET BY MOUTH TWICE DAILY WITH MEALS FOR DIABETES    Multiple Vitamin (Daily-Reina) TABS 1 tablet, Oral, Daily, Take at 8 AM    OneTouch Delica Lancets 33G MISC Does not apply, Daily, TEST BLOOD SUGARS THREE TIMES DAILY    OneTouch Delica Lancets 33G MISC Check blood sugars once daily. Please substitute with appropriate alternative as covered by patient's insurance. Dx: E11.65    QUEtiapine (SEROQUEL XR) 300 mg, Oral, Daily at bedtime    QUEtiapine (SEROQUEL XR) 150 mg, Oral, 2 times daily    simvastatin (ZOCOR) 40 mg tablet GIVE 1 TABLET BY MOUTH AT BEDTIME FOR CHOLESTEROL    temazepam (RESTORIL) 30 mg, Oral, Daily at bedtime    zonisamide (ZONEGRAN) 300 mg, Oral, Daily at bedtime    Allergies   Allergen Reactions    Depakote [Valproic Acid] Other (See Comments)     Must have brand name Depakote    Erythromycin Other (See Comments)     Unknown per pt     Penicillins Other (See Comments)     Unknown per pt      Social History     Tobacco Use    Smoking status: Never    Smokeless tobacco: Never   Vaping Use    Vaping status: Never Used   Substance Use Topics    Alcohol use: Never    Drug use: No    No family history on file.       Objective                            Vitals I/O      Most Recent Min/Max in 24hrs   Temp (!) 93.4 °F (34.1 °C) Temp  Min: 91.9 °F (33.3 °C)  Max: 97 °F (36.1 °C)   Pulse 82 Pulse  Min: 82  Max: 117   Resp 14 Resp  Min: 14  Max: 25   /67 BP  Min: 115/72  Max: 128/67   O2 Sat 94 % SpO2  Min: 91 %  Max: 96 %    No intake or output data in the 24 hours ending 06/03/24 2212    No diet orders on file    Invasive Monitoring           Physical Exam   Physical Exam  Eyes:      Comments: Pupils 3 and fixed   Skin:     General: Skin is cool and dry.   HENT:      Head: Normocephalic and atraumatic.      Mouth/Throat:      Mouth: Mucous membranes are moist.   Cardiovascular:      Rate and Rhythm: Normal rate and regular rhythm.   Musculoskeletal:         General: No deformity or signs of injury.      Right lower leg: Trace Edema present.      Left lower leg: Trace Edema present.   Abdominal: General: There is distension.     Palpations: Abdomen is soft.   Constitutional:       General: He is not in acute distress.     Appearance: He is ill-appearing.      Interventions: He is sedated and intubated.   Pulmonary:      Effort: Pulmonary effort is normal. He is intubated.      Comments: Small amount of bloody secretions inline suction  Right chest tube in place to suction with no tidaling/air leak, small amount of serosanguinous drainage in place  Neurological:      GCS: GCS eye subscore is 1. GCS verbal subscore is 1. GCS motor subscore is 1.      Comments: Exam performed shorlty after sedation for chest tube   Genitourinary/Anorectal:     Comments: Carrillo in place with yellow urine  Carrillo present.          Diagnostic Studies      EKG: Sinus  rhythm  Imaging:  I have personally reviewed pertinent films in PACS     Medications:  Scheduled PRN   sodium chloride, 1,000 mL, Once          Continuous    midazolam, 2 mg/hr         Labs:    CBC    Recent Labs     06/03/24 2045   WBC 10.40*   HGB 13.8   HCT 42.2        BMP    Recent Labs     06/03/24 2104   SODIUM 140   K 3.6      CO2 21   AGAP 12   BUN 29*   CREATININE 0.89   CALCIUM 8.5       Coags    Recent Labs     06/03/24 2045   INR 0.99   PTT 27        Additional Electrolytes  No recent results       Blood Gas    No recent results  Recent Labs     06/03/24 2104   PHVEN 7.179*   ATG9QOP 50.8*   PO2VEN 78.7*   HIF9IXM 18.5*   BEVEN -10.1   H0TGAMW 90.7*    LFTs  Recent Labs     06/03/24 2104   ALT 68*   AST 59*   ALKPHOS 54   ALB 4.0   TBILI 0.22       Infectious  Recent Labs     06/03/24 2045   PROCALCITONI <0.05     Glucose  Recent Labs     06/03/24 2104   GLUC 197*             Anticipated Length of Stay is > 2 midnights  HUNTER Nagel

## 2024-06-04 NOTE — ASSESSMENT & PLAN NOTE
"Continue home medications  Holding home Depakote as patient reportedly requires \"brand name\" Depakote  "

## 2024-06-04 NOTE — ASSESSMENT & PLAN NOTE
Thought to be secondary to hypoxia secondary to airway obstruction  Trend troponin  Check ECHO post arrest

## 2024-06-04 NOTE — ASSESSMENT & PLAN NOTE
Thought to be secondary to hypoxia secondary to airway obstruction  Trend troponin  Check ECHO post arrest  Re-assess mental status to determine if patient would benefit from targeted temperature management  Patient already cool

## 2024-06-04 NOTE — NUTRITION
06/04/24 3641   Recommendations/Interventions   Interventions/Recommendations Other (Specify);Lab consider order (Specify)   Recommendations to Provider Consider initiating 22 hr cyclic EN secondary to holding EN for one hour before and after Omeprazole administration. Recommend: Vital AF 1.2 kcal @ 20 ml/hr and advance to a goal rate of 70 ml/hr (1848 kcal, 115 gms pro, 1247 ml tv). Monitor electrolytes, phosphorus; replete accordingly.

## 2024-06-04 NOTE — RESPIRATORY THERAPY NOTE
06/03/24 2044   Respiratory Assessment   Assessment Type Assess only   General Appearance Sedated   Respiratory Pattern Assisted   Chest Assessment Chest expansion symmetrical   Bilateral Breath Sounds Diminished   Cough None   Resp Comments Pt was successfully intubated with a size 7.5 tube, 26 at the teeth. Pt placed on full vent support with proper settings   O2 Device Vent   Vent Information   Vent ID [id number]   Vent type Drager   Drager Vent Mode AC/VC+   $ Vent Charge-INITIAL Yes   Ventilator Start Yes   Is the patient reintubated? No   $ Pulse Oximetry Spot Check Charge Completed   SpO2 91 %   AC/VC+ Settings   Resp Rate (BPM) 14 BPM   VT (mL) 450 mL   Insp Time (S) 0.9 S   FIO2 (%) 100 %   PEEP (cmH2O) 6 cmH2O   Rise Time (%) 2 %   Humidification Heater   AC/VC+ Actuals   Resp Rate (BPM) 16 BPM   VT (mL) 469 mL   MV (Obs) 6.42   Peak Pressure (cmH2O) 32 cmH2O   I:E Ratio (Obs) 1.0:3.8   AC/VC+ ALARMS   High Peak Pressure (cmH2O) 45 cmH2O   High Resp Rate (BPM) 24 BPM   High MV (L/min) 10.8 L/min   Maintenance   Alarm (pink) cable attached No   Resuscitation bag with peep valve at bedside Yes   Circuit changed No   Daily Screen   Patient safety screen outcome: Failed   Not Ready for Weaning due to: Underline problem not resolved   Adult IBW/VT Calculations   Height 6' (1.829 m)   IBW (Ideal Body Weight) 77.6 kg   Range VT 6 ML/Kg 465.6 mL/kg   Range VT 8 ML/Kg 620.8 mL/kg   Respiratory Charges    $ Intubation/Intubation Assist Yes     RT Ventilator Management Note  Jairon Oconnell 59 y.o. male MRN: [id number]  Unit/Bed#: TR 30 Encounter: [id number]      Daily Screen         6/3/2024  2044             Patient safety screen outcome:: Failed    Not Ready for Weaning due to:: Underline problem not resolved              Physical Exam:   Assessment Type: Assess only  General Appearance: Sedated  Respiratory Pattern: Assisted  Chest Assessment: Chest expansion symmetrical  Bilateral Breath Sounds: Diminished  Cough:  None  O2 Device: Vent      Resp Comments: Pt was successfully intubated with a size 7.5 tube, 26 at the teeth. Pt placed on full vent support with proper settings

## 2024-06-04 NOTE — PLAN OF CARE
Problem: Nutrition/Hydration-ADULT  Goal: Nutrient/Hydration intake appropriate for improving, restoring or maintaining nutritional needs  Description: Monitor and assess patient's nutrition/hydration status for malnutrition. Collaborate with interdisciplinary team and initiate plan and interventions as ordered.  Monitor patient's weight and dietary intake as ordered or per policy. Utilize nutrition screening tool and intervene as necessary. Determine patient's food preferences and provide high-protein, high-caloric foods as appropriate.     INTERVENTIONS:  - Monitor oral intake, urinary output, labs, and treatment plans  - Assess nutrition and hydration status and recommend course of action  - Evaluate amount of meals eaten  - Assist patient with eating if necessary   - Allow adequate time for meals  - Recommend/ encourage appropriate diets, oral nutritional supplements, and vitamin/mineral supplements  - Order, calculate, and assess calorie counts as needed  - Recommend, monitor, and adjust tube feedings and TPN/PPN based on assessed needs  - Assess need for intravenous fluids  - Provide specific nutrition/hydration education as appropriate  - Include patient/family/caregiver in decisions related to nutrition  Outcome: Not Progressing   NPO, will monitor diet advancement/tolerance. EN recommendations provided.

## 2024-06-04 NOTE — ASSESSMENT & PLAN NOTE
Continue mechanical ventilation pending neuro status  Can consider fentanyl/Precedex for ventilatory comfort  Serial ABGs  Respiratory protocol

## 2024-06-04 NOTE — CASE MANAGEMENT
Case Management Discharge Planning Note    Patient name Jairon Oconnell  Location ICU 10/ICU 10 MRN [id number]  : 1965 Date 2024       Current Admission Date: 6/3/2024  Current Admission Diagnosis:Cardiac arrest ([facility])   Patient Active Problem List    Diagnosis Date Noted Date Diagnosed    Lactic acidosis 2024     Acute encephalopathy 2024     Cardiac arrest (HCC) 2024     Dysphagia 2024     Closed traumatic fracture of ribs of right side with pneumothorax 2024     Acute respiratory failure with hypoxia and hypercapnia ([facility]) 2024     Urinary retention 2024     Nondisplaced fracture of greater trochanter of right femur, subsequent encounter for closed fracture with routine healing 2024     Closed nondisplaced fracture of proximal phalanx of left index finger with routine healing, subsequent encounter 10/30/2023     Pressure ulcer of sacral region, stage 3 ([facility]) 2023     Ambulatory dysfunction 2022     Social problem 2021     Hypomagnesemia 2021     Thrombocytopathia (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) 06/15/2016     Acquired hypothyroidism 06/15/2016     Cataract 2013     Ataxic cerebral palsy (HCC) 2013     Generalized convulsive epilepsy ([facility]) 2013     Essential hypertension 2013     Osteoporosis 2013     Scoliosis 2013       LOS (days): 1  Geometric Mean LOS (GMLOS) (days): 5.2  Days to GMLOS:4.6     OBJECTIVE:  Risk of Unplanned Readmission Score: 30.88          Current admission status: Inpatient   Preferred Pharmacy:   [facility] [place] - [address]  [address] [place] [address]  Phone: [phone] Fax: [phone]    [id number] [place] - [address]  [facility] [place] [address]  Phone: [phone] Fax: [phone]    Primary Care Provider: HUNTER Brown    Primary Insurance: MEDICARE  Secondary Insurance: [place] [employer]    DISCHARGE DETAILS:                                          Other Referral/Resources/Interventions Provided:  Referral Comments: Left VMM for pt's sister Kemi ([phone]);  awaiting c/b to complete CMA and discuss d/c plan.  Awaiting same.

## 2024-06-04 NOTE — ASSESSMENT & PLAN NOTE
Continue home medications  If no improvement in neuro exam when sedation from ER wears off, low threshold for spot EEG versus vEEG

## 2024-06-04 NOTE — RESPIRATORY THERAPY NOTE
RT Ventilator Management Note  Jairon Oconnell 59 y.o. male MRN: [id number]  Unit/Bed#: ICU 10 Encounter: [id number]      Daily Screen         6/4/2024  0506 6/4/2024  0789          Patient safety screen outcome:: Failed Failed      Not Ready for Weaning due to:: Underline problem not resolved Underline problem not resolved                Physical Exam:   Assessment Type: Assess only  General Appearance: Sedated  Respiratory Pattern: Assisted  Chest Assessment: Chest expansion symmetrical  Bilateral Breath Sounds: Diminished, Coarse  Suction: ET Tube  O2 Device: vent  Subjective Data: sedated      Resp Comments: pt remains intubated and on full mechanical support  decreased fio2 to 40%  skin integrity remains intact       06/04/24 1028   Respiratory Assessment   Assessment Type Assess only   General Appearance Sedated   Respiratory Pattern Assisted   Chest Assessment Chest expansion symmetrical   Bilateral Breath Sounds Diminished;Coarse   Suction ET Tube   O2 Device vent   Subjective Data sedated   Vent Information   Vent ID Groot   Vent type Drager   Drager Vent Mode AC/VC+   $ Pulse Oximetry Spot Check Charge Completed   SpO2 94 %   AC/VC+ Settings   Resp Rate (BPM) 18 BPM   VT (mL) 450 mL   Insp Time (S) 0.9 S   FIO2 (%) 40 %   PEEP (cmH2O) 8 cmH2O   Rise Time (%) 20 %   Trigger Sensitivity Flow (LPM) 2 LPM   Humidification Heater   Heater Temp 98.6 °F (37 °C)   AC/VC+ Actuals   Resp Rate (BPM) 18 BPM   VT (mL) 454 mL   MV (Obs) 7.56   MAP (cmH2O) 12 cmH2O   Peak Pressure (cmH2O) 23 cmH2O   I:E Ratio (Obs) 1:2.7   Static Compliance (mL/cmH20) 32.7 mL/cmH2O   Plateau Pressure (cm H2O) 12.6 cm H2O   Heater Temperature (Obs) 97.9 °F (36.6 °C)   AC/VC+ ALARMS   High Peak Pressure (cmH2O) 45 cmH2O   High Resp Rate (BPM) 30 BPM   High MV (L/min) 14 L/min   Low MV (L/min) 3 L/min   High VT (mL) 900 mL   Maintenance   Alarm (pink) cable attached Yes   Resuscitation bag with peep valve at bedside Yes   Water bag changed  No   Circuit changed No   ETT  Cuffed 7.5 mm   Placement Date: 06/03/24   Previously placed by?: EMS  Type: Cuffed  Tube Size: 7.5 mm  Placement Verification: Auscultation;Chest x-ray  Secured at (cm): 26   Secured at (cm) 26   Measured from Teeth   Secured Location Right   Secured by Commercial tube ceballos   Site Condition Dry   Cuff Pressure (color) Green   HI-LO Suction    (none)

## 2024-06-04 NOTE — RESPIRATORY THERAPY NOTE
RT Ventilator Management Note  Jairon Oconnell 59 y.o. male MRN: [id number]  Unit/Bed#: ICU 10 Encounter: [id number]      Daily Screen         6/4/2024  0506 6/4/2024  0727          Patient safety screen outcome:: Failed Failed      Not Ready for Weaning due to:: Underline problem not resolved Underline problem not resolved                Physical Exam:   Assessment Type: Assess only  General Appearance: Awake  Respiratory Pattern: Assisted  Chest Assessment: Chest expansion symmetrical  Bilateral Breath Sounds: Diminished, Coarse  Suction: ET Tube  O2 Device: opti  Subjective Data: awake      Resp Comments: pt remains on HFNC       06/04/24 1557   Respiratory Assessment   Assessment Type Assess only   General Appearance Awake   Respiratory Pattern Assisted   Chest Assessment Chest expansion symmetrical   Resp Comments pt remains on HFNC   O2 Device opti   Subjective Data awake   Non-Invasive Information   O2 Interface Device HFNC prongs   Non-Invasive Ventilation Mode HFNC (High flow)   SpO2 92 %   $ Pulse Oximetry Spot Check Charge Completed   Non-Invasive Settings   FiO2 (%) 100   Flow (lpm) 60   Temperature (Set) 31   Non-Invasive Readings   Skin Intervention Skin intact   Heater Temperature (Obs) 31   Maintenance   Water bag changed No

## 2024-06-04 NOTE — PLAN OF CARE
Problem: PAIN - ADULT  Goal: Verbalizes/displays adequate comfort level or baseline comfort level  Description: Interventions:  - Encourage patient to monitor pain and request assistance  - Assess pain using appropriate pain scale  - Administer analgesics based on type and severity of pain and evaluate response  - Implement non-pharmacological measures as appropriate and evaluate response  - Consider cultural and social influences on pain and pain management  - Notify physician/advanced practitioner if interventions unsuccessful or patient reports new pain  Outcome: Progressing     Problem: SAFETY,RESTRAINT: NV/NON-SELF DESTRUCTIVE BEHAVIOR  Goal: Remains free of harm/injury (restraint for non violent/non self-detsructive behavior)  Description: INTERVENTIONS:  - Instruct patient/family regarding restraint use   - Assess and monitor physiologic and psychological status   - Provide interventions and comfort measures to meet assessed patient needs   - Identify and implement measures to help patient regain control  - Assess readiness for release of restraint   Outcome: Progressing  Goal: Returns to optimal restraint-free functioning  Description: INTERVENTIONS:  - Assess the patient's behavior and symptoms that indicate continued need for restraint  - Identify and implement measures to help patient regain control  - Assess readiness for release of restraint   Outcome: Progressing     Problem: Nutrition/Hydration-ADULT  Goal: Nutrient/Hydration intake appropriate for improving, restoring or maintaining nutritional needs  Description: Monitor and assess patient's nutrition/hydration status for malnutrition. Collaborate with interdisciplinary team and initiate plan and interventions as ordered.  Monitor patient's weight and dietary intake as ordered or per policy. Utilize nutrition screening tool and intervene as necessary. Determine patient's food preferences and provide high-protein, high-caloric foods as appropriate.      INTERVENTIONS:  - Monitor oral intake, urinary output, labs, and treatment plans  - Assess nutrition and hydration status and recommend course of action  - Evaluate amount of meals eaten  - Assist patient with eating if necessary   - Allow adequate time for meals  - Recommend/ encourage appropriate diets, oral nutritional supplements, and vitamin/mineral supplements  - Order, calculate, and assess calorie counts as needed  - Recommend, monitor, and adjust tube feedings and TPN/PPN based on assessed needs  - Assess need for intravenous fluids  - Provide specific nutrition/hydration education as appropriate  - Include patient/family/caregiver in decisions related to nutrition  Outcome: Progressing      Father () - PTSD Patient endorses chronic passive SI. Patient had a suicide attempt in 2019 where  found pt after overdosing on pills. Penicillin medical NP made aware SW informed spouse SW contacted spouse will be provided to accepting team.

## 2024-06-04 NOTE — ED NOTES
Received patient intubated in the field, with a tube of 7.5 and 28 at the kai.        Donna Wallace RN  06/03/24 0090

## 2024-06-04 NOTE — RESPIRATORY THERAPY NOTE
RT Ventilator Management Note  Jairon Oconnell 59 y.o. male MRN: [id number]  Unit/Bed#: ICU 10 Encounter: [id number]      Daily Screen         6/4/2024  0506 6/4/2024  0727          Patient safety screen outcome:: Failed Failed      Not Ready for Weaning due to:: Underline problem not resolved Underline problem not resolved                Physical Exam:   Assessment Type: Assess only  General Appearance: Sedated  Respiratory Pattern: Assisted  Chest Assessment: Chest expansion symmetrical  Bilateral Breath Sounds: Diminished, Coarse  Suction: ET Tube  O2 Device: (P) opti  Subjective Data: (P) sleeping      Resp Comments: (P) placed pt on HFNC       06/04/24 1350   Respiratory Assessment   Resp Comments placed pt on HFNC   O2 Device opti   Subjective Data sleeping   Non-Invasive Information   O2 Interface Device HFNC prongs   Non-Invasive Ventilation Mode HFNC (High flow)   $ Pulse Oximetry Spot Check Charge Completed   Non-Invasive Settings   FiO2 (%) 50   Flow (lpm) 50   Temperature (Set) 31   Non-Invasive Readings   Skin Intervention Skin intact   Heater Temperature (Obs) 28.3   Maintenance   Water bag changed No

## 2024-06-04 NOTE — RESPIRATORY THERAPY NOTE
RT Ventilator Management Note  Jairon Oconnell 59 y.o. male MRN: [id number]  Unit/Bed#: ICU 10 Encounter: [id number]      Daily Screen         6/4/2024  0506 6/4/2024  0727          Patient safety screen outcome:: Failed Failed      Not Ready for Weaning due to:: Underline problem not resolved Underline problem not resolved                Physical Exam:   Assessment Type: Assess only  General Appearance: Sedated  Respiratory Pattern: Assisted  Chest Assessment: Chest expansion symmetrical  Bilateral Breath Sounds: Diminished, Coarse  Cough: None  Suction: ET Tube  O2 Device: vent  Subjective Data: sedated      Resp Comments: pt remains intubated and on full mechanical support  decreased fio2 to 40%  skin integrity remains intact         06/04/24 0727   Respiratory Assessment   Assessment Type Assess only   General Appearance Sedated   Respiratory Pattern Assisted   Chest Assessment Chest expansion symmetrical   Bilateral Breath Sounds Diminished;Coarse   Suction ET Tube   Resp Comments pt remains intubated and on full mechanical support  decreased fio2 to 40%  skin integrity remains intact   O2 Device vent   Subjective Data sedated   Vent Information   Vent ID [id number]   Vent type Drager   Drager Vent Mode AC/VC+   Is the patient reintubated? No   $ Pulse Oximetry Spot Check Charge Completed   SpO2 97 %   AC/VC+ Settings   Resp Rate (BPM) 18 BPM   VT (mL) 450 mL   Insp Time (S) 0.9 S   FIO2 (%) (S)  40 %   PEEP (cmH2O) 8 cmH2O   Rise Time (%) 20 %   Trigger Sensitivity Flow (LPM) 2 LPM   Humidification Heater   Heater Temp 98.6 °F (37 °C)   AC/VC+ Actuals   Resp Rate (BPM) 18 BPM   VT (mL) 448 mL   MV (Obs) 7.68   MAP (cmH2O) 13 cmH2O   Peak Pressure (cmH2O) 25 cmH2O   I:E Ratio (Obs) 1:2.7   Static Compliance (mL/cmH20) 30.5 mL/cmH2O   Plateau Pressure (cm H2O) 17.4 cm H2O   Heater Temperature (Obs) 98.6 °F (37 °C)   AC/VC+ ALARMS   High Peak Pressure (cmH2O) 45 cmH2O   High Resp Rate (BPM) 30 BPM   High MV (L/min) 14  L/min   Low MV (L/min) 3 L/min   High VT (mL) 900 mL   Maintenance   Alarm (pink) cable attached Yes   Resuscitation bag with peep valve at bedside Yes   Water bag changed No   Circuit changed No   Daily Screen   Patient safety screen outcome: Failed   Not Ready for Weaning due to: Underline problem not resolved   IHI Ventilator Associated Pneumonia Bundle   Daily Assessment of Readiness to Extubate Yes   Head of Bed Elevated HOB 30   Oral Care Mouth suctioned   ETT  Cuffed 7.5 mm   Placement Date: 06/03/24   Previously placed by?: EMS  Type: Cuffed  Tube Size: 7.5 mm  Placement Verification: Auscultation;Chest x-ray  Secured at (cm): 26   Secured at (cm) 26   Measured from Teeth   Secured Location Center   Repositioned (S)  Center to Right   Secured by Commercial tube ceballos   Site Condition Dry   Cuff Pressure (color) Green   HI-LO Suction    (none)

## 2024-06-04 NOTE — QUICK NOTE
Critical Care Services- Self Extubation Progress Note   Jairon Oconnell 59 y.o. male MRN: [id number]  Unit/Bed#: ICU 10 Encounter: [id number]    Date occurred: 6/4/2024   Time occurred (): 1308  Assigned Nurse: Federica Bales RN    Restraints: yes    Intubation Date: 06/03/2024  Vent settings:  Mode:               CPAP/PS       Pressure Control/Pinsp (cmH2O): 6    FIO2: 40%    PEEP (cmH20): 8    Weaning trial: yes    Continuous medications:   dexmedetomidine, 0.1-1.4 mcg/kg/hr, Last Rate: Stopped (06/04/24 1251)  fentaNYL, 50 mcg/hr, Last Rate: Stopped (06/04/24 1251)  multi-electrolyte, 125 mL/hr, Last Rate: 125 mL/hr (06/04/24 0114)      Sedation HOLD: yes    If Yes- Date held: 06/ 04/ 24   Time held (LurnQ): 1251    PRN medications:   fentaNYL, 50 mcg, Q1H PRN  oxyCODONE, 5 mg, Q6H PRN   Or  oxyCODONE, 10 mg, Q6H PRN      Last date/ time (LurnQ) PRN sedation given: NA    RASS goal: 0 Alert and Calm  Last documented RASS: -2    Did patient need reintubation: no  If NO what is their oxygen requirement: HFNC    Was the patient receiving therapy, testing, or procedure at time of extubation: no  If YES, what type: n/a    Was family notified: yes  Who was notified: caregiver    Narrative of Events/Additional comments: Pt on SBT for potential extubation when he reached up and pulled his ETT out. He was restrained with bilat wrist restraints.     HUNTER Eduardo

## 2024-06-04 NOTE — ED PROVIDER NOTES
History  Chief Complaint   Patient presents with    Cardiac Arrest     Per EMS patient chocked and arrested. CPR performed and rocs in the field. 6.5 of versed and 100 of fentanyl given in the field.        60 yo male with witnessed [facility], he started choking while eating dinner. Heimlich maneuver by bystander on scene unsuccessful. Bystander CPR performed. EMS arrived and found pt with agonal respirations. Lauar forceps used to remove large FB from airway, pt then intubated and ROSC was achieved. En route pt required sedation with fentanyl and versed, he was moving all extremities. Arrives to ED with ETT in place, spontaneous movement of extremities, ST on cardiac monitor and normotensive. Complete history limited due to medical urgency.         Prior to Admission Medications   Prescriptions Last Dose Informant Patient Reported? Taking?   Blood Glucose Monitoring Suppl (ONETOUCH VERIO) w/Device KIT  Care Giver No No   Sig: by Does not apply route 3 (three) times a day TEST BLOOD SUGARS THREE TIMES   Patient not taking: Reported on 7/7/2023   Blood Glucose Monitoring Suppl (OneTouch Verio Reflect) w/Device KIT 6/3/2024  No Yes   Sig: Check blood sugars once daily. Please substitute with appropriate alternative as covered by patient's insurance. Dx: E11.65   Depakote 500 MG DR tablet   No No   Sig: Take 1 tab in the morning and 2 tabs at night. Brand necessary   Disposable Gloves (Safe-Sense Glove-Blk-Nitrl-L) MISC  Care Giver No No   Sig: Use 1 each 3 (three) times a day as needed (care taker assisting with soiled briefs)   Patient not taking: Reported on 9/28/2023   Incontinence Supply Disposable (Briefs Overnight Medium) MISC  Care Giver No No   Sig: Use in the morning   Patient not taking: Reported on 9/28/2023   Multiple Vitamin (Daily-Reina) TABS 6/3/2024 Care Giver No Yes   Sig: Take 1 tablet by mouth daily Take at 8 AM   OneTouch Delica Lancets 33G MISC  Care Giver No No   Sig: by Does not apply route daily  TEST BLOOD SUGARS THREE TIMES DAILY   Patient not taking: Reported on 1/24/2024   OneTouch Delica Lancets 33G MISC   No No   Sig: Check blood sugars once daily. Please substitute with appropriate alternative as covered by patient's insurance. Dx: E11.65   QUEtiapine (SEROquel XR) 150 mg 24 hr tablet 6/3/2024 Care Giver No Yes   Sig: Take 1 tablet (150 mg total) by mouth 2 (two) times a day   QUEtiapine (SEROquel XR) 300 mg 24 hr tablet  Care Giver No No   Sig: Take 1 tablet (300 mg total) by mouth daily at bedtime   Patient taking differently: Take 400 mg by mouth daily at bedtime   benztropine (COGENTIN) 0.5 mg tablet Not Taking  Yes No   Patient not taking: Reported on 6/3/2024   docusate sodium (COLACE) 100 mg capsule   No No   Sig: Take 1 capsule (100 mg total) by mouth 2 (two) times a day   gabapentin (NEURONTIN) 400 mg capsule 6/3/2024 Care Giver No Yes   Sig: Take 1 capsule (400 mg total) by mouth 3 (three) times a day   glucose blood (OneTouch Verio) test strip   No No   Sig: Check blood sugars once daily. Please substitute with appropriate alternative as covered by patient's insurance. Dx: E11.65   hydrOXYzine pamoate (VISTARIL) 25 mg capsule  Care Giver Yes No   Patient not taking: Reported on 4/26/2024   ibuprofen (MOTRIN) 600 mg tablet  Care Giver No No   Sig: Take 1 tablet (600 mg total) by mouth every 6 (six) hours as needed for mild pain   Patient not taking: Reported on 4/26/2024   lacosamide (VIMPAT) 150 mg tablet 6/3/2024  No Yes   Sig: Take 1 tab (150 mg) in the morning.   lacosamide (VIMPAT) 200 mg tablet 6/3/2024  No Yes   Sig: Take 1 tab (200 mg) at night.   levETIRAcetam (KEPPRA) 750 mg tablet 6/3/2024  No Yes   Sig: Give 2 tabs (1500 mg) by mouth twice a day   levothyroxine 150 mcg tablet  Care Giver No No   Sig: Take 1 tablet (150 mcg total) by mouth daily   lisinopril (ZESTRIL) 10 mg tablet 6/3/2024 Care Giver No Yes   Sig: GIVE 1 TABLET BY MOUTH DAILY FOR HYPERTENSION DR. RADU AVERY    loratadine (CLARITIN) 10 mg tablet  Care Giver No No   Sig: Take 1 tablet (10 mg total) by mouth daily   metFORMIN (GLUCOPHAGE) 1000 MG tablet 6/3/2024  No Yes   Sig: GIVE 1 TABLET BY MOUTH TWICE DAILY WITH MEALS FOR DIABETES   simvastatin (ZOCOR) 40 mg tablet 6/2/2024 Care Giver No Yes   Sig: GIVE 1 TABLET BY MOUTH AT BEDTIME FOR CHOLESTEROL   temazepam (RESTORIL) 30 mg capsule  Care Giver No No   Sig: Take 1 capsule (30 mg total) by mouth daily at bedtime for 10 days   Patient taking differently: Take 15 mg by mouth daily at bedtime as needed for sleep   temazepam (RESTORIL) 7.5 mg capsule   Yes Yes   Sig: Take 15 mg by mouth daily at bedtime as needed for sleep   zonisamide (ZONEGRAN) 100 mg capsule 6/3/2024  No Yes   Sig: Take 3 capsules (300 mg total) by mouth daily at bedtime      Facility-Administered Medications: None       Past Medical History:   Diagnosis Date    ADRIANA (acute kidney injury) ([facility]) 9/24/2019    Bacteremia due to Gram-positive bacteria 9/7/2021    Buttock wound, left, subsequent encounter 11/5/2021    COVID-19     CP (cerebral palsy) ([facility])     Depression     Diabetes ([facility])     Disease of thyroid gland     Gait disorder     Gluteal abscess 9/6/2021    Hyperlipidemia     Hypertension     Kidney failure     Kidney stones     Moderate protein-calorie malnutrition ([facility]) 12/22/2021    Osteoporosis     Pressure injury of left buttock, stage 4 ([facility]) 3/9/2022    Psychiatric disorder     Scoliosis     Seizures ([facility])     Sepsis ([facility]) 9/25/2019    Thyroid disease     UTI (urinary tract infection)        Past Surgical History:   Procedure Laterality Date    APPENDECTOMY      IR PICC PLACEMENT SINGLE LUMEN  9/13/2021    IR PICC PLACEMENT SINGLE LUMEN  9/16/2021       No family history on file.  I have reviewed and agree with the history as documented.    E-Cigarette/Vaping    E-Cigarette Use Never User      E-Cigarette/Vaping Substances    Nicotine No     THC No     CBD No     Flavoring No     Other No      Unknown No      Social History     Tobacco Use    Smoking status: Never    Smokeless tobacco: Never   Vaping Use    Vaping status: Never Used   Substance Use Topics    Alcohol use: Never    Drug use: No       Review of Systems   Unable to perform ROS: Intubated       Physical Exam  Physical Exam  Constitutional:       Comments: Intubated. ST on cardiac monitor.    HENT:      Head: Normocephalic and atraumatic.      Mouth/Throat:      Mouth: Mucous membranes are moist.      Pharynx: Oropharynx is clear.   Cardiovascular:      Rate and Rhythm: Tachycardia present.      Pulses: Normal pulses.   Pulmonary:      Comments: Decreased breath sounds on the R side.   Musculoskeletal:      Comments: R tibial IO in place.    Skin:     General: Skin is warm and dry.   Neurological:      Comments: Intubated. Moving extremities spontaneously.          Vital Signs  ED Triage Vitals   Temperature Pulse Respirations Blood Pressure SpO2   06/03/24 2020 06/03/24 2020 06/03/24 2020 06/03/24 2020 06/03/24 2020   (!) 97 °F (36.1 °C) (!) 116 18 121/75 96 %      Temp Source Heart Rate Source Patient Position - Orthostatic VS BP Location FiO2 (%)   06/03/24 2020 06/03/24 2020 06/03/24 2020 06/03/24 2020 06/04/24 0020   Rectal Monitor Lying Right arm 60      Pain Score       06/04/24 0900       No Pain           Vitals:    06/08/24 2219 06/08/24 2300 06/09/24 0300 06/09/24 0500   BP: 103/54 103/57 133/60 145/68   Pulse: 85 87 86 97   Patient Position - Orthostatic VS: Lying  Lying          Visual Acuity  Visual Acuity      Flowsheet Row Most Recent Value   L Pupil Size (mm) 3   R Pupil Size (mm) 3   L Pupil Shape Round   R Pupil Shape Round            ED Medications  Medications   gabapentin (NEURONTIN) capsule 400 mg (400 mg Oral Given 6/9/24 0929)   lacosamide (VIMPAT) tablet 150 mg (150 mg Per NG Tube Given 6/9/24 0929)   lacosamide (VIMPAT) tablet 200 mg (200 mg Per NG Tube Not Given 6/8/24 2320)   levETIRAcetam (KEPPRA) tablet 1,500 mg  (1,500 mg Oral Given 6/9/24 0928)   levothyroxine tablet 150 mcg (150 mcg Oral Given 6/9/24 0929)   loratadine (CLARITIN) tablet 10 mg (10 mg Oral Given 6/9/24 0929)   pravastatin (PRAVACHOL) tablet 80 mg (80 mg Oral Given 6/8/24 1640)   zonisamide (ZONEGRAN) capsule 300 mg (300 mg Oral Not Given 6/8/24 2321)   chlorhexidine (PERIDEX) 0.12 % oral rinse 15 mL (15 mL Mouth/Throat Given 6/9/24 0928)   senna-docusate sodium (SENOKOT S) 8.6-50 mg per tablet 2 tablet (2 tablets Oral Given 6/9/24 0929)   lidocaine (LIDODERM) 5 % patch 2 patch (2 patches Topical Medication Applied 6/9/24 0929)   ondansetron (ZOFRAN) injection 4 mg (4 mg Intravenous Given 6/8/24 1108)   oxyCODONE (ROXICODONE) IR tablet 5 mg ( Oral See Alternative 6/8/24 1130)     Or   oxyCODONE (ROXICODONE) immediate release tablet 10 mg (10 mg Oral Given 6/8/24 1130)   acetaminophen (TYLENOL) tablet 975 mg (975 mg Oral Given 6/9/24 0506)   ketorolac (TORADOL) injection 15 mg (15 mg Intravenous Given 6/9/24 0545)   cefTRIAXone (ROCEPHIN) 1,000 mg in dextrose 5 % 50 mL IVPB (0 mg Intravenous Stopped 6/8/24 2005)   QUEtiapine (SEROquel XR) 24 hr tablet 150 mg (150 mg Oral Given 6/9/24 0931)   QUEtiapine (SEROquel XR) 24 hr tablet 400 mg (400 mg Oral Not Given 6/8/24 2320)   bisacodyl (DULCOLAX) rectal suppository 10 mg (has no administration in time range)   divalproex sodium (DEPAKOTE SPRINKLE) capsule 500 mg (has no administration in time range)   fentaNYL injection 100 mcg (100 mcg Intravenous Given 6/3/24 2033)   midazolam (VERSED) injection 5 mg (5 mg Intravenous Given 6/3/24 2028)   lidocaine-epinephrine (XYLOCAINE-MPF/EPINEPHRINE) 2 %-1:200,000 injection 10 mL (10 mL Infiltration Given 6/3/24 2034)   fentanyl citrate (PF) (FOR EMS ONLY) 100 mcg/2 mL injection 200 mcg (0 mcg Does not apply Given to EMS 6/3/24 2040)   midazolam (FOR EMS ONLY) (VERSED) 2 mg/2 mL injection 8 mg (0 mg Does not apply Given to EMS 6/3/24 2050)   fentaNYL injection 100 mcg (100  mcg Intravenous Given 6/3/24 2155)   iohexol (OMNIPAQUE) 350 MG/ML injection (MULTI-DOSE) 85 mL (85 mL Intravenous Given 6/3/24 2151)   sodium chloride 0.9 % bolus 1,000 mL (0 mL Intravenous Stopped 6/3/24 2309)   fentaNYL injection 100 mcg (100 mcg Intravenous Given 6/4/24 0020)   fentaNYL injection 100 mcg (100 mcg Intravenous Given 6/4/24 0310)   magnesium sulfate 2 g/50 mL IVPB (premix) 2 g (0 g Intravenous Stopped 6/4/24 1257)   perflutren lipid microsphere (DEFINITY) injection (2 mL/min Intravenous Given 6/4/24 0911)   albumin human (FLEXBUMIN) 5 % injection 12.5 g (0 g Intravenous Stopped 6/4/24 1501)   ketorolac (TORADOL) injection 15 mg (15 mg Intravenous Given 6/5/24 1150)   potassium phosphates 12 mmol in sodium chloride 0.9 % 250 mL infusion (0 mmol Intravenous Stopped 6/5/24 1900)   calcium gluconate 2 g in sodium chloride 0.9% 100 mL (premix) (2 g Intravenous New Bag 6/5/24 1019)   barium sulfate 60 % cream 2.5 g (2.5 g Oral Given 6/5/24 1149)   barium sulfate (LIQUID E-Z-PAQUE) oral suspension 355 mL (75 mL Oral Given 6/5/24 1148)   barium sulfate 98 % oral suspension 135 mL (50 mL Oral Given 6/5/24 1148)   potassium phosphates 12 mmol in sodium chloride 0.9 % 250 mL infusion (0 mmol Intravenous Stopped 6/6/24 1330)   furosemide (LASIX) injection 20 mg (20 mg Intravenous Given 6/6/24 2111)   magnesium sulfate 2 g/50 mL IVPB (premix) 2 g (2 g Intravenous New Bag 6/7/24 0643)   potassium phosphates 12 mmol in sodium chloride 0.9 % 250 mL infusion (0 mmol Intravenous Stopped 6/7/24 1430)   furosemide (LASIX) injection 20 mg (20 mg Intravenous Given 6/7/24 0643)   magnesium sulfate IVPB (premix) SOLN 1 g (0 g Intravenous Stopped 6/8/24 0636)   magnesium sulfate 4 g/100 mL IVPB (premix) 4 g (0 g Intravenous Stopped 6/8/24 1353)   potassium chloride oral solution 20 mEq (20 mEq Oral Given 6/8/24 1024)   furosemide (LASIX) injection 20 mg (20 mg Intravenous Given 6/8/24 1227)   albumin human (FLEXBUMIN) 5 %  injection 25 g (0 g Intravenous Stopped 6/8/24 2102)   levETIRAcetam (KEPPRA) injection 1,500 mg (1,500 mg Intravenous Given 6/8/24 2331)   lacosamide (VIMPAT) injection 200 mg (200 mg Intravenous Given 6/8/24 2331)   methylnaltrexone (Relistor) subcutaneous injection 12 mg (12 mg Subcutaneous Given 6/9/24 0930)       Diagnostic Studies  Results Reviewed       Procedure Component Value Units Date/Time    Blood culture #2 [[id number]] Collected: 06/03/24 2104    Lab Status: Final result Specimen: Blood from Arm, Left Updated: 06/09/24 0301     Blood Culture No Growth After 5 Days.    Blood culture #1 [[id number]] Collected: 06/03/24 2104    Lab Status: Final result Specimen: Blood from Arm, Left Updated: 06/09/24 0301     Blood Culture No Growth After 5 Days.    Urine culture [[id number]] Collected: 06/03/24 2128    Lab Status: Final result Specimen: Urine, Clean Catch Updated: 06/04/24 2045     Urine Culture 50,000-59,000 cfu/ml    HS Troponin I 4hr [[id number]]  (Abnormal) Collected: 06/04/24 0148    Lab Status: Final result Specimen: Blood from Arm, Right Updated: 06/04/24 0255     hs TnI 4hr 124 ng/L      Delta 4hr hsTnI 102 ng/L     POCT Blood Gas (CG8+) [[id number]]  (Abnormal) Collected: 06/03/24 2026    Lab Status: Final result Specimen: Venous Updated: 06/04/24 0212     ph, Raghavendra ISTAT 7.139     pCO2, Raghavendra i-STAT 54.2 mm HG      pO2, Raghavendra i-STAT 51.0 mm HG      BE, i-STAT -11 mmol/L      HCO3, Raghavendra i-STAT 18.4 mmol/L      CO2, i-STAT 20 mmol/L      O2 Sat, i-STAT 74 %      SODIUM, I-STAT 143 mmol/l      Potassium, i-STAT 3.1 mmol/L      Calcium, Ionized i-STAT 1.23 mmol/L      Hct, i-STAT 42 %      Hgb, i-STAT 14.3 g/dl      Glucose, i-STAT 203 mg/dl      Specimen Type VENOUS    HS Troponin I 2hr [[id number]]  (Abnormal) Collected: 06/03/24 2313    Lab Status: Final result Specimen: Blood from Arm, Left Updated: 06/03/24 3049     hs TnI 2hr 66 ng/L      Delta 2hr hsTnI 44 ng/L     Lactic acid 2 Hours [[id number]]   (Abnormal) Collected: 06/03/24 2315    Lab Status: Final result Specimen: Blood from Arm, Left Updated: 06/03/24 2341     LACTIC ACID 2.1 mmol/L     Narrative:      Result may be elevated if tourniquet was used during collection.    Procalcitonin [[id number]]  (Normal) Collected: 06/03/24 2045    Lab Status: Final result Specimen: Blood from Line Updated: 06/03/24 2206     Procalcitonin <0.05 ng/ml     B-Type Natriuretic Peptide(BNP) [[id number]]  (Normal) Collected: 06/03/24 2045    Lab Status: Final result Specimen: Blood from Line Updated: 06/03/24 2202     BNP 9 pg/mL     HS Troponin 0hr (reflex protocol) [[id number]]  (Normal) Collected: 06/03/24 2045    Lab Status: Final result Specimen: Blood from Line Updated: 06/03/24 2201     hs TnI 0hr 22 ng/L     Urine Microscopic [[id number]]  (Abnormal) Collected: 06/03/24 2128    Lab Status: Final result Specimen: Urine, Clean Catch Updated: 06/03/24 2144     RBC, UA 2-4 /hpf      WBC, UA 30-50 /hpf      Epithelial Cells Occasional /hpf      Bacteria, UA None Seen /hpf      MUCUS THREADS Occasional     Hyaline Casts, UA 10-25 /lpf      Granular Casts, UA 3-5    UA w Reflex to Microscopic w Reflex to Culture [[id number]]  (Abnormal) Collected: 06/03/24 2128    Lab Status: Final result Specimen: Urine, Clean Catch Updated: 06/03/24 2136     Color, UA Yellow     Clarity, UA Turbid     Specific Gravity, UA 1.018     pH, UA 5.5     Leukocytes, UA Trace     Nitrite, UA Negative     Protein,  (2+) mg/dl      Glucose, UA Trace mg/dl      Ketones, UA Negative mg/dl      Urobilinogen, UA <2.0 mg/dl      Bilirubin, UA Negative     Occult Blood, UA Trace    Comprehensive metabolic panel [[id number]]  (Abnormal) Collected: 06/03/24 2104    Lab Status: Final result Specimen: Blood from Arm, Left Updated: 06/03/24 2132     Sodium 140 mmol/L      Potassium 3.6 mmol/L      Chloride 107 mmol/L      CO2 21 mmol/L      ANION GAP 12 mmol/L      BUN 29 mg/dL      Creatinine 0.89 mg/dL       Glucose 197 mg/dL      Calcium 8.5 mg/dL      AST 59 U/L      ALT 68 U/L      Alkaline Phosphatase 54 U/L      Total Protein 6.8 g/dL      Albumin 4.0 g/dL      Total Bilirubin 0.22 mg/dL      eGFR 94 ml/min/1.73sq m     Narrative:      [employer] Kid[employer] guidelines for Chronic Kidney Disease (CKD):     Stage 1 with normal or high GFR (GFR > 90 mL/min/1.73 square meters)    Stage 2 Mild CKD (GFR = 60-89 mL/min/1.73 square meters)    Stage 3A Moderate CKD (GFR = 45-59 mL/min/1.73 square meters)    Stage 3B Moderate CKD (GFR = 30-44 mL/min/1.73 square meters)    Stage 4 Severe CKD (GFR = 15-29 mL/min/1.73 square meters)    Stage 5 End Stage CKD (GFR <15 mL/min/1.73 square meters)  Note: GFR calculation is accurate only with a steady state creatinine    Lactic acid [[id number]]  (Abnormal) Collected: 06/03/24 2045    Lab Status: Final result Specimen: Blood from Line Updated: 06/03/24 2132     LACTIC ACID 7.0 mmol/L     Narrative:      Result may be elevated if tourniquet was used during collection.    Blood gas, venous [[id number]]  (Abnormal) Collected: 06/03/24 2104    Lab Status: Final result Specimen: Blood from Arm, Left Updated: 06/03/24 2120     pH, Raghavendra 7.179     pCO2, Raghavendra 50.8 mm Hg      pO2, Raghavendra 78.7 mm Hg      HCO3, Raghavendra 18.5 mmol/L      Base Excess, Raghavendra -10.1 mmol/L      O2 Content, Raghavendra 18.9 ml/dL      O2 HGB, VENOUS 90.7 %     Protime-INR [[id number]]  (Normal) Collected: 06/03/24 2045    Lab Status: Final result Specimen: Blood from Line Updated: 06/03/24 2115     Protime 13.7 seconds      INR 0.99    APTT [[id number]]  (Normal) Collected: 06/03/24 2045    Lab Status: Final result Specimen: Blood from Line Updated: 06/03/24 2115     PTT 27 seconds     CBC and differential [[id number]]  (Abnormal) Collected: 06/03/24 2045    Lab Status: Final result Specimen: Blood from Line Updated: 06/03/24 2102     WBC 10.40 Thousand/uL      RBC 4.31 Million/uL      Hemoglobin 13.8 g/dL      Hematocrit 42.2 %       MCV 98 fL      MCH 32.0 pg      MCHC 32.7 g/dL      RDW 13.3 %      MPV 11.8 fL      Platelets 159 Thousands/uL      nRBC 0 /100 WBCs      Segmented % 51 %      Immature Grans % 2 %      Lymphocytes % 34 %      Monocytes % 9 %      Eosinophils Relative 3 %      Basophils Relative 1 %      Absolute Neutrophils 5.48 Thousands/µL      Absolute Immature Grans 0.17 Thousand/uL      Absolute Lymphocytes 3.51 Thousands/µL      Absolute Monocytes 0.92 Thousand/µL      Eosinophils Absolute 0.26 Thousand/µL      Basophils Absolute 0.06 Thousands/µL     Fingerstick Glucose (POCT) [[id number]]  (Abnormal) Collected: 06/03/24 2021    Lab Status: Final result Specimen: Blood Updated: 06/03/24 2030     POC Glucose 168 mg/dl                    XR chest portable ICU   Final Result by Chrystal Chavez MD (06/08 1151)      Right pigtail removed with no pneumothorax.      Increase in right pleural effusion, moderate, with right greater than left consolidation, likely atelectasis. Pneumonia/aspiration not excluded.      Acute right rib fractures not visible.            Workstation performed: [id number]         XR chest portable ICU   Final Result by Brenden Gutierrez MD (06/08 0628)      Persistent CHF. Worsening right upper lobe infiltrate. Persistent retrocardiac density.            Workstation performed: [id number]         XR chest portable ICU   Final Result by Kev Lyons MD (06/06 1556)      Worsened pulmonary edema. New dense consolidation in the right midlung, possibly aspiration.         Workstation performed: [id number]         FL barium swallow video w speech   Final Result by CL ESQUEDA (06/05 1149)      XR chest portable ICU   Final Result by Jamia Velasquez MD (06/04 1316)   No pneumothorax visualized.   Tubes and lines, as above. Consider retraction of the endotracheal tube by 1.6 cm.   Perihilar patchy airspace opacities. Consolidations in the posterior medial lower lobes.             Workstation performed: [id number]         CT head without contrast   Final Result by Espinoza Matamoros MD (06/03 2255)      No acute intracranial abnormality.                  Workstation performed: [id number]         CT chest with contrast   Final Result by Espinoza Matamoros MD (06/03 2314)      Bilateral lower lobe airspace consolidations as well as tree-in-bud and groundglass opacities most conspicuous at the right upper lobe compatible with bronchopneumonia      Numerous bilateral anterior rib fractures.               Workstation performed: [id number]         XR chest 1 view portable   ED Interpretation by Rajesh Ferro MD (06/03 2135)   Reexpansion of R lung, adequate positioning of R chest tube.       Final Result by Kev Leonardo MD (06/04 1057)      Since Ramona 3, 2000 2420:42:   1.  New right chest tube with decrease size of the right pneumothorax. Assessment of the pneumothorax is limited due to and overlying pacer pad, though on the subsequent CT chest, the pneumothorax is trace.   2.  Right lower lobe atelectasis.   3.  Persistent slight high positioning of enteric tube with sidehole at the expected position of the diaphragmatic hiatus. Advancement by approximately 4 cm is advised.         I personally discussed this study with Dr. Carias on 6/4/2024 10:56 AM.            Workstation performed: [id number]         XR chest 1 view portable   ED Interpretation by Rajesh Ferro MD (06/03 2104)   Large R PTX  ETT at demetrius  OGT subdiaphragmatic.       Final Result by Slade Fleming MD (06/04 0955)      Large right pneumothorax.      Endotracheal tube at the level of the demetrius, recommend retraction.      Enteric tube with tip at the level of the gastroesophageal junction, sideport in the lower esophagus.   Recommend advancement.      Multiple right anterior minimally displaced rib fractures.      Findings concur with the preliminary report by the referring clinician already in PACS and/or our electronic  record EPIC.               Workstation performed: [id number]         XR chest portable ICU    (Results Pending)              Procedures  Chest Tube    Date/Time: 6/3/2024 9:19 PM    Performed by: Rajesh Ferro MD  Authorized by: Rajesh Ferro MD    Consent:     Consent obtained:  Emergent situation  Pre-procedure details:     Skin preparation:  ChloraPrep    Preparation: Patient was prepped and draped in the usual sterile fashion    Indications:     Indications: pneumothroax    Procedure details:     Laterality:  Right    Approach:  Percutaneous    Scalpel size:  11    Access kit used: 10 Nepali.    Tube connected to:  Suction    Drainage characteristics:  Air only    Suture material:  0 silk    Dressing:  4x4 sterile gauze  Post-procedure details:     Post-insertion x-ray findings comment:  Adequately positioned    Patient tolerance of procedure:  Tolerated well, no immediate complications           ED Course  ED Course as of 06/09/24 0937   Mon Jun 03, 2024 2139 Lactic acidosis from cardiac arrest, not from infection.                                              Medical Decision Making  Problems Addressed:  Aspiration of gastric contents into trachea: complicated acute illness or injury that poses a threat to life or bodily functions  Cardiopulmonary arrest (HCC): complicated acute illness or injury that poses a threat to life or bodily functions  Hypothermia, initial encounter: complicated acute illness or injury    Amount and/or Complexity of Data Reviewed  Labs: ordered.  Radiology: ordered and independent interpretation performed.    Risk  Prescription drug management.  Decision regarding hospitalization.             Disposition  Final diagnoses:   Cardiopulmonary arrest (HCC)   Aspiration of gastric contents into trachea   Hypothermia, initial encounter     Time reflects when diagnosis was documented in both MDM as applicable and the Disposition within this note       Time User Action Codes Description  Comment    6/3/2024  9:36 PM Ferro, Rajesh Add [I46.9] Cardiac arrest (HCC)     6/3/2024  9:36 PM Ferro, Rajesh Remove [I46.9] Cardiac arrest (HCC)     6/3/2024  9:36 PM Ferro, Rajesh Add [I46.9] Cardiopulmonary arrest (HCC)     6/3/2024  9:36 PM Ferro, Rajesh Add [T17.418A] Aspiration of gastric contents into trachea     6/3/2024 10:47 PM Ferro, Rajesh Add [T68.XXXA] Hypothermia, initial encounter     6/3/2024 10:50 PM John Churchill Add [I46.9] Cardiac arrest (HCC)           ED Disposition       ED Disposition   Admit    Condition   Stable    Date/Time   Mon Shashi 3, 2024 10:52 PM    Comment   Case was discussed with Dr Carias and the patient's admission status was agreed to be Admission Status: inpatient status to the service of Dr. Carias .               Follow-up Information       Follow up With Specialties Details Why Contact Info    [facility] Follow up Agency will call you to set up a schedule of visits.  Agency should see you within 48 hours of discharge.  Services requested: nursing, physical therapy, occupational therapy. [address] [facility]  [address]  [place] [address]  [phone]              Current Discharge Medication List        CONTINUE these medications which have NOT CHANGED    Details   !! Blood Glucose Monitoring Suppl (OneTouch Verio Reflect) w/Device KIT Check blood sugars once daily. Please substitute with appropriate alternative as covered by patient's insurance. Dx: E11.65  Qty: 1 kit, Refills: 0    Comments: Please substitute with appropriate alternative as covered by patient's insurance  Associated Diagnoses: Type 2 diabetes mellitus with diabetic neuropathy, without long-term current use of insulin ([facility])      gabapentin (NEURONTIN) 400 mg capsule Take 1 capsule (400 mg total) by mouth 3 (three) times a day    Associated Diagnoses: Type 2 diabetes mellitus with diabetic neuropathy, without long-term current use of insulin ([facility])      !! lacosamide (VIMPAT) 150 mg  tablet Take 1 tab (150 mg) in the morning.  Qty: 30 tablet, Refills: 5    Associated Diagnoses: Generalized convulsive epilepsy (HCC)      !! lacosamide (VIMPAT) 200 mg tablet Take 1 tab (200 mg) at night.  Qty: 30 tablet, Refills: 5    Associated Diagnoses: Generalized convulsive epilepsy (HCC)      levETIRAcetam (KEPPRA) 750 mg tablet Give 2 tabs (1500 mg) by mouth twice a day  Qty: 120 tablet, Refills: 11    Associated Diagnoses: Involuntary movements      lisinopril (ZESTRIL) 10 mg tablet GIVE 1 TABLET BY MOUTH DAILY FOR HYPERTENSION DR. RADU AVERY  Qty: 90 tablet, Refills: 3    Associated Diagnoses: Hypertension, unspecified type      metFORMIN (GLUCOPHAGE) 1000 MG tablet GIVE 1 TABLET BY MOUTH TWICE DAILY WITH MEALS FOR DIABETES  Qty: 180 tablet, Refills: 0    Associated Diagnoses: Diabetes mellitus due to underlying condition with hyperosmolarity without coma, without long-term current use of insulin (HCC)      Multiple Vitamin (Daily-Reina) TABS Take 1 tablet by mouth daily Take at 8 AM  Qty: 90 tablet, Refills: 11    Associated Diagnoses: Diabetes mellitus due to underlying condition with hyperosmolarity without coma, without long-term current use of insulin (HCC); Hyperlipidemia, unspecified hyperlipidemia type; Seizure (HCC); Neuropathy; Hypertension, unspecified type      !! QUEtiapine (SEROquel XR) 150 mg 24 hr tablet Take 1 tablet (150 mg total) by mouth 2 (two) times a day  Qty: 30 tablet, Refills: 0    Associated Diagnoses: Behavior concern in adult      simvastatin (ZOCOR) 40 mg tablet GIVE 1 TABLET BY MOUTH AT BEDTIME FOR CHOLESTEROL  Qty: 90 tablet, Refills: 3    Associated Diagnoses: Hyperlipidemia, unspecified hyperlipidemia type      temazepam (RESTORIL) 7.5 mg capsule Take 15 mg by mouth daily at bedtime as needed for sleep      zonisamide (ZONEGRAN) 100 mg capsule Take 3 capsules (300 mg total) by mouth daily at bedtime  Qty: 90 capsule, Refills: 11    Associated Diagnoses: Behavior concern  in adult      benztropine (COGENTIN) 0.5 mg tablet       !! Blood Glucose Monitoring Suppl (ONETOUCH VERIO) w/Device KIT by Does not apply route 3 (three) times a day TEST BLOOD SUGARS THREE TIMES  Qty: 1 kit, Refills: 0    Associated Diagnoses: Diabetes mellitus without complication ([facility]); Diabetes mellitus due to underlying condition with hyperosmolarity without coma, without long-term current use of insulin ([facility])      Depakote 500 MG DR tablet Take 1 tab in the morning and 2 tabs at night. Brand necessary  Qty: 90 tablet, Refills: 11    Associated Diagnoses: Generalized convulsive epilepsy ([facility])      Disposable Gloves (Safe-Sense Glove-Blk-Nitrl-L) MISC Use 1 each 3 (three) times a day as needed (care taker assisting with soiled briefs)  Qty: 100 each, Refills: 1    Associated Diagnoses: Behavior concern in adult; Urinary incontinence, unspecified type      docusate sodium (COLACE) 100 mg capsule Take 1 capsule (100 mg total) by mouth 2 (two) times a day  Qty: 180 capsule, Refills: 1    Associated Diagnoses: Constipation, unspecified constipation type      glucose blood (OneTouch Verio) test strip Check blood sugars once daily. Please substitute with appropriate alternative as covered by patient's insurance. Dx: E11.65  Qty: 100 each, Refills: 3    Comments: Please substitute with appropriate alternative as covered by patient's insurance  Associated Diagnoses: Type 2 diabetes mellitus with diabetic neuropathy, without long-term current use of insulin ([facility])      hydrOXYzine pamoate (VISTARIL) 25 mg capsule       ibuprofen (MOTRIN) 600 mg tablet Take 1 tablet (600 mg total) by mouth every 6 (six) hours as needed for mild pain  Qty: 90 tablet, Refills: 0    Associated Diagnoses: Right hip pain      Incontinence Supply Disposable (Briefs Overnight Medium) MISC Use in the morning  Qty: 20 each, Refills: 11    Associated Diagnoses: Urinary incontinence, unspecified type      levothyroxine 150 mcg tablet Take 1 tablet  (150 mcg total) by mouth daily  Qty: 90 tablet, Refills: 3    Associated Diagnoses: Hypothyroidism due to Hashimoto's thyroiditis      loratadine (CLARITIN) 10 mg tablet Take 1 tablet (10 mg total) by mouth daily  Qty: 90 tablet, Refills: 3    Associated Diagnoses: Environmental and seasonal allergies      !! OneTouch Delica Lancets 33G MISC by Does not apply route daily TEST BLOOD SUGARS THREE TIMES DAILY  Qty: 100 each, Refills: 2    Associated Diagnoses: Diabetes mellitus due to underlying condition with hyperosmolarity without coma, without long-term current use of insulin (HCC)      !! OneTouch Delica Lancets 33G MISC Check blood sugars once daily. Please substitute with appropriate alternative as covered by patient's insurance. Dx: E11.65  Qty: 100 each, Refills: 3    Comments: Please substitute with appropriate alternative as covered by patient's insurance  Associated Diagnoses: Type 2 diabetes mellitus with diabetic neuropathy, without long-term current use of insulin (HCC)      !! QUEtiapine (SEROquel XR) 300 mg 24 hr tablet Take 1 tablet (300 mg total) by mouth daily at bedtime  Qty: 30 tablet, Refills: 0    Associated Diagnoses: Behavior concern in adult       !! - Potential duplicate medications found. Please discuss with provider.          No discharge procedures on file.    PDMP Review         Value Time User    PDMP Reviewed  Yes 9/1/2023  2:41 PM HUNTER Hopson            ED Provider  Electronically Signed by             Rajesh Ferro MD  06/09/24 0937

## 2024-06-04 NOTE — PROGRESS NOTES
"[facility]  Progress Note  Name: Jairon Oconnell I  MRN: [id number]  Unit/Bed#: ICU 10 I Date of Admission: 6/3/2024   Date of Service: 6/4/2024 I Hospital Day: 1    Assessment & Plan   Acute respiratory failure with hypoxia and hypercapnia (HCC)  Assessment & Plan  Anticipate SAT/SBT   Continue Precedex for ventilatory comfort  Serial ABGs  Respiratory protocol    Closed traumatic fracture of ribs of right side with pneumothorax  Assessment & Plan  S/p 10 Fr right chest tube  Continue suction   Aggressive, multi-modal pain control    Dysphagia  Assessment & Plan  Speech consult when liberated    Acute encephalopathy  Assessment & Plan  Improving  Continue Precedex for ventilatory comfort    Lactic acidosis  Assessment & Plan  Generous volume resuscitation  Trend q2 for now    Ambulatory dysfunction  Assessment & Plan  PT/OT when appropriate  Fall precautions    Behavior concern in adult  Assessment & Plan  Can likely resume home Seroquel today    Acquired hypothyroidism  Assessment & Plan  Continue home levothyroxine  Check TSH with reflex T4    Generalized convulsive epilepsy (HCC)  Assessment & Plan  Continue home medications  Holding home Depakote as patient reportedly requires \"brand name\" Depakote    * Cardiac arrest (HCC)  Assessment & Plan  Thought to be secondary to hypoxia secondary to airway obstruction  Trend troponin  Check ECHO post arrest         Disposition: Critical care    ICU Core Measures     A: Assess, Prevent, and Manage Pain Has pain been assessed? Yes  Need for changes to pain regimen? Yes   B: Both SAT/SAT  N/A   C: Choice of Sedation RASS Goal: 0 Alert and Calm  Need for changes to sedation or analgesia regimen? No   D: Delirium CAM-ICU: Unable to perform secondary to Acute cognitive dysfunction   E: Early Mobility  Plan for early mobility? Yes   F: Family Engagement Plan for family engagement today? Yes       Review of Invasive Devices:    Gerardo Plan: Continue for " accurate I/O monitoring for 48 hours        Prophylaxis:  VTE VTE covered by:  enoxaparin, Subcutaneous       Stress Ulcer  covered byomeprazole (PRILOSEC) suspension 2 mg/mL [[id number]]         Significant 24hr Events     24hr events: Patient with improving neuro exam overnight, transitioned to Precedex, slowly weaning vent settings     Subjective   Review of Systems: Review of Systems   Unable to perform ROS: Intubated        Objective                            Vitals I/O      Most Recent Min/Max in 24hrs   Temp (!) 93 °F (33.9 °C) Temp  Min: 91.9 °F (33.3 °C)  Max: 97 °F (36.1 °C)   Pulse 78 Pulse  Min: 75  Max: 117   Resp 14 Resp  Min: 14  Max: 25   /77 BP  Min: 115/72  Max: 144/73   O2 Sat 95 % SpO2  Min: 91 %  Max: 100 %    No intake or output data in the 24 hours ending 06/04/24 0035    Diet NPO; Sips with meds    Invasive Monitoring           Physical Exam   Physical Exam  Eyes:      Pupils: Pupils are equal, round, and reactive to light.   Skin:     General: Skin is warm and dry.   HENT:      Head: Normocephalic and atraumatic.      Mouth/Throat:      Mouth: Mucous membranes are moist.   Cardiovascular:      Rate and Rhythm: Normal rate and regular rhythm.   Musculoskeletal:         General: No signs of injury.      Right lower leg: Trace Edema present.      Left lower leg: Trace Edema present.   Abdominal: General: There is distension.     Palpations: Abdomen is soft.   Constitutional:       Appearance: He is ill-appearing.      Interventions: He is sedated, intubated and restrained.   Pulmonary:      Effort: Pulmonary effort is normal. He is intubated.      Breath sounds: Rhonchi present.      Comments: Moderate amount of white/tan secretions inline suction  Right chest tube in place to suction with no tidaling/air leak, small amount of serosanguinous drainage present  Neurological:      GCS: GCS eye subscore is 4. GCS verbal subscore is 1. GCS motor subscore is 6.      Comments: Moves all  extremities to request   Genitourinary/Anorectal:     Comments: Carrillo in place with yellow urine  Carrillo present.          Diagnostic Studies      EKG: Sinus rhythm  Imaging:  I have personally reviewed pertinent reports.   and I have personally reviewed pertinent films in PACS     Medications:  Scheduled PRN   acetaminophen, 650 mg, Q6H  chlorhexidine, 15 mL, Q12H RONALD  enoxaparin, 40 mg, Daily  gabapentin, 400 mg, TID  insulin lispro, 1-6 Units, Q6H RONALD  lacosamide, 150 mg, QAM  lacosamide, 200 mg, HS  levETIRAcetam, 1,500 mg, Q12H RONALD  levothyroxine, 150 mcg, Daily  lidocaine, 2 patch, Daily  loratadine, 10 mg, Daily  omeprazole (PRILOSEC) suspension 2 mg/mL, 20 mg, Daily  pravastatin, 80 mg, Daily With Dinner  senna-docusate sodium, 2 tablet, BID  zonisamide, 300 mg, HS      fentaNYL, 50 mcg, Q1H PRN  oxyCODONE, 5 mg, Q6H PRN   Or  oxyCODONE, 10 mg, Q6H PRN       Continuous    dexmedetomidine, 0.1-0.7 mcg/kg/hr, Last Rate: 0.3 mcg/kg/hr (06/04/24 0021)  multi-electrolyte, 125 mL/hr         Labs:    CBC    Recent Labs     06/03/24 2045   WBC 10.40*   HGB 13.8   HCT 42.2        BMP    Recent Labs     06/03/24 2104   SODIUM 140   K 3.6      CO2 21   AGAP 12   BUN 29*   CREATININE 0.89   CALCIUM 8.5       Coags    Recent Labs     06/03/24 2045   INR 0.99   PTT 27        Additional Electrolytes  No recent results       Blood Gas    No recent results  Recent Labs     06/03/24 2104   PHVEN 7.179*   HOI9UKL 50.8*   PO2VEN 78.7*   HZW7DIE 18.5*   BEVEN -10.1   T4OWZXE 90.7*    LFTs  Recent Labs     06/03/24 2104   ALT 68*   AST 59*   ALKPHOS 54   ALB 4.0   TBILI 0.22       Infectious  Recent Labs     06/03/24 2045   PROCALCITONI <0.05     Glucose  Recent Labs     06/03/24 2104   GLUC 197*               HUNTER Nagel

## 2024-06-05 ENCOUNTER — APPOINTMENT (INPATIENT)
Dept: RADIOLOGY | Facility: HOSPITAL | Age: 59
DRG: 208 | End: 2024-06-05
Payer: MEDICARE

## 2024-06-05 LAB
ANION GAP SERPL CALCULATED.3IONS-SCNC: 7 MMOL/L (ref 4–13)
BASOPHILS # BLD AUTO: 0.03 THOUSANDS/ÂΜL (ref 0–0.1)
BASOPHILS NFR BLD AUTO: 0 % (ref 0–1)
BUN SERPL-MCNC: 33 MG/DL (ref 5–25)
CA-I BLD-SCNC: 1.11 MMOL/L (ref 1.12–1.32)
CALCIUM SERPL-MCNC: 8.3 MG/DL (ref 8.4–10.2)
CHLORIDE SERPL-SCNC: 108 MMOL/L (ref 96–108)
CO2 SERPL-SCNC: 26 MMOL/L (ref 21–32)
CREAT SERPL-MCNC: 0.86 MG/DL (ref 0.6–1.3)
EOSINOPHIL # BLD AUTO: 0.01 THOUSAND/ÂΜL (ref 0–0.61)
EOSINOPHIL NFR BLD AUTO: 0 % (ref 0–6)
ERYTHROCYTE [DISTWIDTH] IN BLOOD BY AUTOMATED COUNT: 13.9 % (ref 11.6–15.1)
GFR SERPL CREATININE-BSD FRML MDRD: 96 ML/MIN/1.73SQ M
GLUCOSE SERPL-MCNC: 123 MG/DL (ref 65–140)
GLUCOSE SERPL-MCNC: 130 MG/DL (ref 65–140)
GLUCOSE SERPL-MCNC: 94 MG/DL (ref 65–140)
HCT VFR BLD AUTO: 31.9 % (ref 36.5–49.3)
HGB BLD-MCNC: 10.8 G/DL (ref 12–17)
IMM GRANULOCYTES # BLD AUTO: 0.05 THOUSAND/UL (ref 0–0.2)
IMM GRANULOCYTES NFR BLD AUTO: 1 % (ref 0–2)
LYMPHOCYTES # BLD AUTO: 1.43 THOUSANDS/ÂΜL (ref 0.6–4.47)
LYMPHOCYTES NFR BLD AUTO: 17 % (ref 14–44)
MCH RBC QN AUTO: 32.1 PG (ref 26.8–34.3)
MCHC RBC AUTO-ENTMCNC: 33.9 G/DL (ref 31.4–37.4)
MCV RBC AUTO: 95 FL (ref 82–98)
MONOCYTES # BLD AUTO: 1.27 THOUSAND/ÂΜL (ref 0.17–1.22)
MONOCYTES NFR BLD AUTO: 15 % (ref 4–12)
MRSA NOSE QL CULT: NORMAL
NEUTROPHILS # BLD AUTO: 5.44 THOUSANDS/ÂΜL (ref 1.85–7.62)
NEUTS SEG NFR BLD AUTO: 67 % (ref 43–75)
NRBC BLD AUTO-RTO: 0 /100 WBCS
PHOSPHATE SERPL-MCNC: 2.9 MG/DL (ref 2.7–4.5)
PLATELET # BLD AUTO: 112 THOUSANDS/UL (ref 149–390)
PMV BLD AUTO: 11.9 FL (ref 8.9–12.7)
POTASSIUM SERPL-SCNC: 3.7 MMOL/L (ref 3.5–5.3)
PROCALCITONIN SERPL-MCNC: 2.28 NG/ML
RBC # BLD AUTO: 3.36 MILLION/UL (ref 3.88–5.62)
SODIUM SERPL-SCNC: 141 MMOL/L (ref 135–147)
WBC # BLD AUTO: 8.23 THOUSAND/UL (ref 4.31–10.16)

## 2024-06-05 PROCEDURE — 99233 SBSQ HOSP IP/OBS HIGH 50: CPT | Performed by: ANESTHESIOLOGY

## 2024-06-05 PROCEDURE — 92611 MOTION FLUOROSCOPY/SWALLOW: CPT

## 2024-06-05 PROCEDURE — 82948 REAGENT STRIP/BLOOD GLUCOSE: CPT

## 2024-06-05 PROCEDURE — 97167 OT EVAL HIGH COMPLEX 60 MIN: CPT

## 2024-06-05 PROCEDURE — 82330 ASSAY OF CALCIUM: CPT

## 2024-06-05 PROCEDURE — 84100 ASSAY OF PHOSPHORUS: CPT

## 2024-06-05 PROCEDURE — 92610 EVALUATE SWALLOWING FUNCTION: CPT

## 2024-06-05 PROCEDURE — 80048 BASIC METABOLIC PNL TOTAL CA: CPT

## 2024-06-05 PROCEDURE — 74230 X-RAY XM SWLNG FUNCJ C+: CPT

## 2024-06-05 PROCEDURE — 94760 N-INVAS EAR/PLS OXIMETRY 1: CPT

## 2024-06-05 PROCEDURE — 84145 PROCALCITONIN (PCT): CPT

## 2024-06-05 PROCEDURE — 85025 COMPLETE CBC W/AUTO DIFF WBC: CPT

## 2024-06-05 PROCEDURE — 97163 PT EVAL HIGH COMPLEX 45 MIN: CPT

## 2024-06-05 RX ORDER — CALCIUM GLUCONATE 20 MG/ML
2 INJECTION, SOLUTION INTRAVENOUS ONCE
Status: COMPLETED | OUTPATIENT
Start: 2024-06-05 | End: 2024-06-05

## 2024-06-05 RX ADMIN — KETOROLAC TROMETHAMINE 15 MG: 30 INJECTION, SOLUTION INTRAMUSCULAR; INTRAVENOUS at 11:50

## 2024-06-05 RX ADMIN — ONDANSETRON 4 MG: 2 INJECTION INTRAMUSCULAR; INTRAVENOUS at 05:23

## 2024-06-05 RX ADMIN — CHLORHEXIDINE GLUCONATE 0.12% ORAL RINSE 15 ML: 1.2 LIQUID ORAL at 20:40

## 2024-06-05 RX ADMIN — CHLORHEXIDINE GLUCONATE 0.12% ORAL RINSE 15 ML: 1.2 LIQUID ORAL at 10:19

## 2024-06-05 RX ADMIN — CALCIUM GLUCONATE 2 G: 20 INJECTION, SOLUTION INTRAVENOUS at 10:19

## 2024-06-05 RX ADMIN — LEVETIRACETAM 1500 MG: 500 TABLET, FILM COATED ORAL at 17:25

## 2024-06-05 RX ADMIN — PRAVASTATIN SODIUM 80 MG: 80 TABLET ORAL at 17:26

## 2024-06-05 RX ADMIN — ZONISAMIDE 300 MG: 100 CAPSULE ORAL at 22:20

## 2024-06-05 RX ADMIN — SODIUM CHLORIDE, SODIUM GLUCONATE, SODIUM ACETATE, POTASSIUM CHLORIDE, MAGNESIUM CHLORIDE, SODIUM PHOSPHATE, DIBASIC, AND POTASSIUM PHOSPHATE 125 ML/HR: .53; .5; .37; .037; .03; .012; .00082 INJECTION, SOLUTION INTRAVENOUS at 11:49

## 2024-06-05 RX ADMIN — ENOXAPARIN SODIUM 40 MG: 40 INJECTION SUBCUTANEOUS at 10:19

## 2024-06-05 RX ADMIN — KETOROLAC TROMETHAMINE 15 MG: 30 INJECTION, SOLUTION INTRAMUSCULAR; INTRAVENOUS at 05:23

## 2024-06-05 RX ADMIN — LACOSAMIDE 150 MG: 100 TABLET, FILM COATED ORAL at 17:26

## 2024-06-05 RX ADMIN — ACETAMINOPHEN 1000 MG: 10 INJECTION INTRAVENOUS at 10:19

## 2024-06-05 RX ADMIN — GABAPENTIN 400 MG: 400 CAPSULE ORAL at 17:26

## 2024-06-05 RX ADMIN — LIDOCAINE 5% 2 PATCH: 700 PATCH TOPICAL at 10:19

## 2024-06-05 RX ADMIN — SODIUM CHLORIDE, SODIUM GLUCONATE, SODIUM ACETATE, POTASSIUM CHLORIDE, MAGNESIUM CHLORIDE, SODIUM PHOSPHATE, DIBASIC, AND POTASSIUM PHOSPHATE 125 ML/HR: .53; .5; .37; .037; .03; .012; .00082 INJECTION, SOLUTION INTRAVENOUS at 03:29

## 2024-06-05 RX ADMIN — OXYCODONE HYDROCHLORIDE 10 MG: 5 SOLUTION ORAL at 08:26

## 2024-06-05 RX ADMIN — LEVETIRACETAM 1500 MG: 500 TABLET, FILM COATED ORAL at 20:40

## 2024-06-05 RX ADMIN — ONDANSETRON 4 MG: 2 INJECTION INTRAMUSCULAR; INTRAVENOUS at 17:26

## 2024-06-05 RX ADMIN — LACOSAMIDE 200 MG: 100 TABLET, FILM COATED ORAL at 22:20

## 2024-06-05 RX ADMIN — SODIUM CHLORIDE, SODIUM GLUCONATE, SODIUM ACETATE, POTASSIUM CHLORIDE, MAGNESIUM CHLORIDE, SODIUM PHOSPHATE, DIBASIC, AND POTASSIUM PHOSPHATE 125 ML/HR: .53; .5; .37; .037; .03; .012; .00082 INJECTION, SOLUTION INTRAVENOUS at 19:54

## 2024-06-05 RX ADMIN — SENNOSIDES AND DOCUSATE SODIUM 2 TABLET: 50; 8.6 TABLET ORAL at 17:26

## 2024-06-05 RX ADMIN — KETOROLAC TROMETHAMINE 15 MG: 30 INJECTION, SOLUTION INTRAMUSCULAR; INTRAVENOUS at 00:56

## 2024-06-05 RX ADMIN — POTASSIUM PHOSPHATE, MONOBASIC AND POTASSIUM PHOSPHATE, DIBASIC 12 MMOL: 224; 236 INJECTION, SOLUTION, CONCENTRATE INTRAVENOUS at 11:47

## 2024-06-05 RX ADMIN — ACETAMINOPHEN 1000 MG: 10 INJECTION INTRAVENOUS at 03:24

## 2024-06-05 RX ADMIN — ACETAMINOPHEN 1000 MG: 10 INJECTION INTRAVENOUS at 20:40

## 2024-06-05 RX ADMIN — GABAPENTIN 400 MG: 400 CAPSULE ORAL at 20:40

## 2024-06-05 NOTE — RESPIRATORY THERAPY NOTE
06/05/24 0143   Respiratory Assessment   General Appearance Awake   Respiratory Pattern Normal   Chest Assessment Chest expansion symmetrical   Bilateral Breath Sounds Diminished   Cough None   O2 Device optiflow   Non-Invasive Information   O2 Interface Device HFNC prongs   Non-Invasive Ventilation Mode HFNC (High flow)   SpO2 98 %   $ Pulse Oximetry Spot Check Charge Completed   Non-Invasive Settings   FiO2 (%) (S)  70   Flow (lpm) 60   Temperature (Set) 31   Non-Invasive Readings   Skin Intervention Skin intact   Heater Temperature (Obs) 30.9   Maintenance   Water bag changed Yes

## 2024-06-05 NOTE — ASSESSMENT & PLAN NOTE
Thought to be secondary to hypoxia secondary to airway obstruction in the setting of chocking  Now post self extubation, purposeful movement  WIth residual AHRF, 2/2 rib fractures vs aspiration as below  ECHO- EF 55%  Lactic acidosis, cardiac enzymes all decreasing  Currently HD stable aside from hypoxia

## 2024-06-05 NOTE — SPEECH THERAPY NOTE
Speech-Language Pathology Bedside Swallow Evaluation      Patient Name: Jairon Oconnell  Today's Date: 6/5/2024     Problem List  Principal Problem:    Cardiac arrest (HCC)  Active Problems:    Generalized convulsive epilepsy (HCC)    Acquired hypothyroidism    Behavior concern in adult    Ambulatory dysfunction    Lactic acidosis    Acute encephalopathy    Dysphagia    Closed traumatic fracture of ribs of right side with pneumothorax    Acute respiratory failure with hypoxia and hypercapnia ([facility])       Past Medical History  Past Medical History:   Diagnosis Date    ADRIANA (acute kidney injury) ([facility]) 9/24/2019    Bacteremia due to Gram-positive bacteria 9/7/2021    Buttock wound, left, subsequent encounter 11/5/2021    COVID-19     CP (cerebral palsy) ([facility])     Depression     Diabetes ([facility])     Disease of thyroid gland     Gait disorder     Gluteal abscess 9/6/2021    Hyperlipidemia     Hypertension     Kidney failure     Kidney stones     Moderate protein-calorie malnutrition ([facility]) 12/22/2021    Osteoporosis     Pressure injury of left buttock, stage 4 ([facility]) 3/9/2022    Psychiatric disorder     Scoliosis     Seizures ([facility])     Sepsis ([facility]) 9/25/2019    Thyroid disease     UTI (urinary tract infection)        Past Surgical History  Past Surgical History:   Procedure Laterality Date    APPENDECTOMY      IR PICC PLACEMENT SINGLE LUMEN  9/13/2021    IR PICC PLACEMENT SINGLE LUMEN  9/16/2021       Summary/Impressions:    Pt presents with s/s oropharyngeal dysphagia.  Overt coughing w/ desaturation observed prior to PO offerings.  Suggestive of poor secretion management, possible aspiration of same.  While consistent reflexive swallow noted upon palpation, increased cough +congestion noted w/ minimal PO offerings (<1/2 tsp quantities).  Cannot r/o pharyngeal dysphagia, now possibly worsened by self-extubation.  Suggest instrumental measures (MBS) prior to additional oral intake.       Recommendations:   Diet: NPO   Meds:  non-oral if possible   Other Recommendations/ considerations: frequent oral care, MBS    Current Medical Status  Pt is a 59 y.o. male who presented to [facility] with PMH seizure disorder, bipolar, HTN, HLD, hypothyroid presenting s/p cardiac arrest after choking episode. Witnessed choking while eating dinner and bystander initiated heimlich- unsuccessful and CPR started. Agonal respirations mo EMS arrival and large piece of food material removed from airway. ROSC obtained. Fent + versed given en route to ED. +moving extremities. CXR in ED showed pneumothorax, likely secondary to CPR, chest tube placed. Admitted to unit intubated and sedated for management.     Patient self-extubated 6/4/24; now on HFNC 40L @ 40%.   Initially passed RN dysphagia screening. Now with overt coughing following oral meds and w/ secretions.  No PMHx dysphagia indicated in chart upon review.     Past medical history:  Please see H&P for details    Special Studies:  Chest XR 6/4/24:  No pneumothorax visualized.  Tubes and lines, as above. Consider retraction of the endotracheal tube by 1.6 cm.  Perihilar patchy airspace opacities. Consolidations in the posterior medial lower lobes.    Social/Education/Vocational Hx:  Pt lives in group home    Swallow Information   Current Risks for Dysphagia & Aspiration: recent intubation  Current Symptoms/Concerns: choking incident  Current Diet: mechanically altered/level 2 diet and thin liquids   Baseline Diet: regular diet and thin liquids-presumed     Baseline Assessment   Behavior/Cognition: decreased attention  Speech/Language Status: able to follow commands inconsistently  Patient Positioning: upright in chair    Swallow Mechanism Exam   Facial: symmetrical  Labial: decreased ROM right side  Lingual: WFL  Velum: unable to visualize 2/2 command following and lingual size   Mandible: adequate ROM  Dentition: adequate  Vocal quality:weak - limited verbal output  Volitional Cough: weak    Respiratory: HFNC    Consistencies Assessed and Performance   Consistencies Administered: iced spoon, honey thick and puree    Oral Stage: Adequate bolus retrieval, +impulsivity noted.  Lingual protrusion w/ transfer, though appears prompt.  No oral retention.  Mastication/chewable solids not assessed at bedside.    Pharyngeal Stage: Laryngeal rise noted upon palpation.  Swallow initiation appears delayed.  Patient elicits audible gulps.  Overt coughing w/ all PO, as well as secretions/prior to PO offerings.  Desaturation.  Cannot r/o pharyngeal dysphagia/aspiration, timeliness or completion of airway closure.     Esophageal Concerns: none reported      Results Reviewed with: patient, RN, and CRNP     Plan  Will continue to follow for x1-3    Dysphagia Goals: pt will participate in VBS      Cheri Green MS, CCC-SLP  Speech-Language Pathologist  [place] #[id number]  [place] #[id number]

## 2024-06-05 NOTE — PROGRESS NOTES
"[facility]  Critical Care Progress Note  Name: Jairon Oconnell I  MRN: [id number]  Unit/Bed#: ICU 10 I Date of Admission: 6/3/2024   Date of Service: 6/5/2024 I Hospital Day: 2    Assessment & Plan   * Cardiac arrest (HCC)  Assessment & Plan  Thought to be secondary to hypoxia secondary to airway obstruction in the setting of chocking  Now post self extubation, purposeful movement  WIth residual AHRF, 2/2 rib fractures vs aspiration as below  ECHO- EF 55%  Lactic acidosis, cardiac enzymes all decreasing  Currently HD stable aside from hypoxia    Acute respiratory failure with hypoxia and hypercapnia (HCC)  Assessment & Plan  S/p self extubation 6/4  With residual AHRF in setting of rib fractures, likely aspiration  Currently on HFNC 100% 60L  Wean as able to maintain Spo2 >92%  Aggressive pain control    Closed traumatic fracture of ribs of right side with pneumothorax  Assessment & Plan  S/p 10 Fr right chest tube  Continue suction   Aggressive, multi-modal pain control    Dysphagia  Assessment & Plan  Speech consult in AM now s/p extubation    Acute encephalopathy  Assessment & Plan  Now s/p self extubation  Able to nod appropriately to question, but majority of speech is incomprehensible  Serial neuro checks  CAM ICU  Delerium precautions    Lactic acidosis  Assessment & Plan  Generous volume resuscitation  Improved    Ambulatory dysfunction  Assessment & Plan  PT/OT when appropriate  Fall precautions    Behavior concern in adult  Assessment & Plan  Holding home serouqel  Consider restarting 6/4    Acquired hypothyroidism  Assessment & Plan  Continue home levothyroxine  Check TSH with reflex T4    Generalized convulsive epilepsy (HCC)  Assessment & Plan  Continue home medications  Holding home Depakote as patient reportedly requires \"brand name\" Depakote      Disposition: Critical care    ICU Core Measures     A: Assess, Prevent, and Manage Pain Has pain been assessed? Yes  Need for " changes to pain regimen? No   B: Both SAT/SAT  N/A   C: Choice of Sedation RASS Goal: 0 Alert and Calm  Need for changes to sedation or analgesia regimen? No   D: Delirium CAM-ICU: Positive   E: Early Mobility  Plan for early mobility? Yes   F: Family Engagement Plan for family engagement today? Yes     Review of Invasive Devices:  [place] Plan: Continue for accurate I/O monitoring for 48 hours    Prophylaxis:  VTE VTE covered by:  enoxaparin, Subcutaneous, 40 mg at 06/04/24 0801       Stress Ulcer  covered byomeprazole (PRILOSEC) suspension 2 mg/mL [[id number]]     Significant 24hr Events     24hr events: 59 YOM hospital D2 post cardiac arrest after choking event. During SBT, patient self extubated, requiring placement of HFNC. AHRF thought 2/2 rib fractures. Tolerating HFNC at 70% 60L. Patient remaining encephalopathic, but appropriate at times. Continues to be hemodynamically stable aside from AHRF. No additional events overnight.     Subjective   Review of Systems: Review of Systems   Unable to perform ROS: Patient nonverbal   Psychiatric/Behavioral:  Positive for agitation.         Objective                            Vitals I/O      Most Recent Min/Max in 24hrs   Temp 99.5 °F (37.5 °C) Temp  Min: 94.5 °F (34.7 °C)  Max: 100.8 °F (38.2 °C)   Pulse 98 Pulse  Min: 60  Max: 109   Resp 20 Resp  Min: 14  Max: 26   /66 BP  Min: 78/52  Max: 164/74   O2 Sat 97 % SpO2  Min: 82 %  Max: 99 %      Intake/Output Summary (Last 24 hours) at 6/5/2024 0049  Last data filed at 6/4/2024 2013  Gross per 24 hour   Intake 2728.33 ml   Output 1205 ml   Net 1523.33 ml       Diet NPO; Sips with meds    Invasive Monitoring   N/a        Physical Exam   Physical Exam  Vitals and nursing note reviewed.   Eyes:      Extraocular Movements: Extraocular movements intact.      Pupils: Pupils are equal, round, and reactive to light.   Skin:     General: Skin is dry.      Capillary Refill: Capillary refill takes less than 2 seconds.   HENT:       Head: Normocephalic and atraumatic.      Right Ear: Hearing normal.      Left Ear: Hearing normal.      Mouth/Throat:      Mouth: Mucous membranes are moist.   Cardiovascular:      Rate and Rhythm: Regular rhythm. Tachycardia present.      Pulses: Normal pulses.      Heart sounds: Normal heart sounds.   Abdominal:      Palpations: Abdomen is soft.      Tenderness: There is no abdominal tenderness. There is no guarding.   Pulmonary:      Effort: Tachypnea present.      Breath sounds: Rhonchi present. No rales.   Neurological:      General: No focal deficit present.      Mental Status: Mental status is at baseline. He is agitated.      Sensory: Sensation is intact.      Motor: gross motor function is at baseline for patient.            Diagnostic Studies      EKG: NSR rate 98  Imaging: ECHO Ef 55%, CTH negative   I have personally reviewed pertinent reports.       Medications:  Scheduled PRN   acetaminophen, 1,000 mg, Q8H  chlorhexidine, 15 mL, Q12H RONALD  enoxaparin, 40 mg, Daily  gabapentin, 400 mg, TID  insulin lispro, 1-6 Units, Q6H RONALD  ketorolac, 15 mg, Q6H RONALD  lacosamide, 150 mg, QAM  lacosamide, 200 mg, HS  levETIRAcetam, 1,500 mg, Q12H RONALD  levothyroxine, 150 mcg, Daily  lidocaine, 2 patch, Daily  loratadine, 10 mg, Daily  omeprazole (PRILOSEC) suspension 2 mg/mL, 20 mg, Daily  pravastatin, 80 mg, Daily With Dinner  senna-docusate sodium, 2 tablet, BID  zonisamide, 300 mg, HS      ondansetron, 4 mg, Q6H PRN  oxyCODONE, 5 mg, Q6H PRN   Or  oxyCODONE, 10 mg, Q6H PRN       Continuous    multi-electrolyte, 125 mL/hr, Last Rate: 125 mL/hr (06/04/24 1906)         Labs:    CBC    Recent Labs     06/03/24 2045 06/04/24  0553   WBC 10.40* 7.14   HGB 13.8 12.5   HCT 42.2 38.2    133*     BMP    Recent Labs     06/03/24 2104 06/04/24  0553   SODIUM 140 141   K 3.6 4.4    112*   CO2 21 22   AGAP 12 7   BUN 29* 28*   CREATININE 0.89 0.63   CALCIUM 8.5 7.5*       [place]    Recent Labs     06/03/24  8496    INR 0.99   PTT 27        Additional Electrolytes  Recent Labs     06/03/24 2026 06/04/24  0553   MG  --  1.3*   PHOS  --  3.2   CAIONIZED 1.23  --           Blood Gas    No recent results  Recent Labs     06/03/24 2104   PHVEN 7.179*   XKW1NOW 50.8*   PO2VEN 78.7*   UCM6HFL 18.5*   BEVEN -10.1   [id number] 90.7*    LFTs  Recent Labs     06/03/24 2104 06/04/24  0553   ALT 68* 48   AST 59* 29   ALKPHOS 54 37   ALB 4.0 3.2*   TBILI 0.22 0.28       Infectious  Recent Labs     06/03/24 2045   PROCALCITONI <0.05     Glucose  Recent Labs     06/03/24 2104 06/04/24  0553   GLUC 197* 160*        HUNTER Chambers

## 2024-06-05 NOTE — ASSESSMENT & PLAN NOTE
Now s/p self extubation  Able to nod appropriately to question, but majority of speech is incomprehensible  Serial neuro checks  CAM ICU  Delerium precautions

## 2024-06-05 NOTE — PLAN OF CARE
Problem: OCCUPATIONAL THERAPY ADULT  Goal: Performs self-care activities at highest level of function for planned discharge setting.  See evaluation for individualized goals.  Description: Treatment Interventions: ADL retraining, Functional transfer training, UE strengthening/ROM, Endurance training, Cognitive reorientation, Patient/family training, Equipment evaluation/education, Fine motor coordination activities, Compensatory technique education, Activityengagement          See flowsheet documentation for full assessment, interventions and recommendations.   Note: Limitation: Decreased ADL status, Decreased UE strength, Decreased Safe judgement during ADL, Decreased cognition, Decreased endurance, Decreased self-care trans, Decreased high-level ADLs, Decreased fine motor control, Decreased UE ROM  Prognosis: Good  Assessment: Patient is a 59 y.o. male seen for OT evaluation s/p admit to [facility]  on 6/3/2024 w/Cardiac arrest (HCC). Commorbidities affecting patient's functional performance at time of assessment include: epilepsy, hypothyroidism, behavior concerns, ambulatory dysfunction, acute encephalopathy, dysphagia, acute respiratory failure, and closed traumatic fracture of R ribs. Orders placed for OT evaluation and treatment and out of bed to chair. Performed at least two patient identifiers during session including name and wristband.  Prior to admission, Per chart review, patient requires assistance with ADLs/IADLs and lives in a two story house with 1st floor set-up. Personal factors affecting patient at time of initial evaluation include: limited insight into deficits, decreased initiation and engagement, difficulty performing ADLs, and difficulty performing IADLs. Upon evaluation, patient requires moderate assist for UB ADLs, maximal assist for LB ADLs, transfers and functional ambulation in room and bathroom with moderate assist of 2, with the use of Rolling Walker.  Patient is  oriented to name,, oriented to place,, disoriented to time,, and disoriented to situation, and presents with limited ability to recall information after a brief period of time (short term memory) / working memory .  Occupational performance is affected by the following deficits: orientation, limited active ROM, decreased muscle strength, impaired gross motor coordination, impaired fine motor coordination, dynamic sit/ stand balance deficit with poor standing tolerance time for self care and functional mobility, decreased activity tolerance, impaired memory, impaired problem solving, decreased safety awareness, increased pain, and delayed righting and equilibrium reactions. Patient to benefit from continued Occupational Therapy treatment while in the hospital to address deficits as defined above and maximize level of functional independence with ADLs and functional mobility. Occupational Performance areas to address include: eating, grooming , bathing/ shower, dressing, toilet hygiene, transfer to all surfaces, and functional ambulation. From OT standpoint, recommendation at time of d/c would be Level II (moderate resource intensity).     Rehab Resource Intensity Level, OT: II (Moderate Resource Intensity)     Yanira HELLER, OTR/L

## 2024-06-05 NOTE — ASSESSMENT & PLAN NOTE
S/p self extubation 6/4  With residual AHRF in setting of rib fractures, likely aspiration  Currently on HFNC 100% 60L  Wean as able to maintain Spo2 >92%  Aggressive pain control

## 2024-06-05 NOTE — RESPIRATORY THERAPY NOTE
06/05/24 1155   Respiratory Assessment   Assessment Type Assess only   General Appearance Awake;Alert   Respiratory Pattern Normal   Chest Assessment Chest expansion symmetrical   Bilateral Breath Sounds Diminished;Coarse   Resp Comments pt with increasing o2 needs post swallow study   O2 Device opti   Oxygen Therapy/Pulse Ox   O2 Device Mid flow nasal cannula   O2 Therapy Oxygen humidified   O2 Flow Rate (L/min) (S)  15 L/min   SpO2 94 %   SpO2 Activity At Rest

## 2024-06-05 NOTE — PHYSICAL THERAPY NOTE
Physical Therapy Evaluation     Patient's Name: Jairon Oconnell    Admitting Diagnosis  Cardiac arrest (HCC) [I46.9]  Cardiopulmonary arrest (HCC) [I46.9]  Hypothermia, initial encounter [T68.XXXA]  Aspiration of gastric contents into trachea [T17.418A]    Problem List  Patient Active Problem List   Diagnosis    Cataract    Ataxic cerebral palsy (HCC)    Type 2 diabetes mellitus with diabetic neuropathy, without long-term current use of insulin (HCC)    Generalized convulsive epilepsy (HCC)    Mixed hyperlipidemia    Essential hypertension    Acquired hypothyroidism    Osteoporosis    Scoliosis    Vitamin B12 deficiency    Vitamin D deficiency    Seborrheic dermatitis of scalp    Nearsightedness    Behavior concern in adult    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[facility])    Metabolic encephalopathy    Hyponatremia    Thrombocytopathia ([facility])    Hypomagnesemia    Social problem    Ambulatory dysfunction    Pressure ulcer of sacral region, stage 3 ([facility])    Closed nondisplaced fracture of proximal phalanx of left index finger with routine healing, subsequent encounter    Nondisplaced fracture of greater trochanter of right femur, subsequent encounter for closed fracture with routine healing    Urinary retention    Lactic acidosis    Acute encephalopathy    Cardiac arrest (HCC)    Dysphagia    Closed traumatic fracture of ribs of right side with pneumothorax    Acute respiratory failure with hypoxia and hypercapnia ([facility])     Past Medical History  Past Medical History:   Diagnosis Date    ADRIANA (acute kidney injury) ([facility]) 9/24/2019    Bacteremia due to Gram-positive bacteria 9/7/2021    Buttock wound, left, subsequent encounter 11/5/2021    COVID-19     CP (cerebral palsy) ([facility])     Depression     Diabetes ([facility])     Disease of thyroid gland     Gait disorder     Gluteal abscess 9/6/2021    Hyperlipidemia     Hypertension     Kidney failure     Kidney stones     Moderate protein-calorie malnutrition ([facility]) 12/22/2021     Osteoporosis     Pressure injury of left buttock, stage 4 ([facility]) 3/9/2022    Psychiatric disorder     Scoliosis     Seizures ([facility])     Sepsis ([facility]) 9/25/2019    Thyroid disease     UTI (urinary tract infection)      Past Surgical History  Past Surgical History:   Procedure Laterality Date    APPENDECTOMY      IR PICC PLACEMENT SINGLE LUMEN  9/13/2021    IR PICC PLACEMENT SINGLE LUMEN  9/16/2021 06/05/24 0824   PT Last Visit   PT Visit Date 06/05/24   Note Type   Note type Evaluation   Pain Assessment   Pain Assessment Tool 0-10   Pain Score 8   Pain Location/Orientation Orientation: Right;Location: Rib Cage   Pain Onset/Description Onset: Ongoing   Hospital Pain Intervention(s) Repositioned;Ambulation/increased activity  (RN aware)   Restrictions/Precautions   Weight Bearing Precautions Per Order Yes   RLE Weight Bearing Per Order WBAT  (with no active hip abduction per chart review; previous PT eval 2/28/24)   Other Precautions Cognitive;Chair Alarm;Bed Alarm;Multiple lines;Telemetry;O2;Fall Risk;Pain  (+chest tube; +HFNC)   Home Living   Type of Home House   Home Layout Two level;Able to live on main level with bedroom/bathroom;Performs ADLs on one level  (No DHARMESH)   Home Equipment Walker;Wheelchair-manual   Prior Function   Level of [place] Needs assistance with functional mobility;Needs assistance with ADLs;Needs assistance with IADLS   Lives With Family  (caregiver and sister)   Receives Help From Family;Home health   IADLs Family/Friend/Other provides transportation;Family/Friend/Other provides meals;Family/Friend/Other provides medication management   Falls in the last 6 months 1 to 4   Vocational Retired   Comments Pt is a poor historian. Information regarding home setup and PLOF obtained from chart review. Pt with recent STR stay at [facility].   General   Family/Caregiver Present No   Cognition   Overall Cognitive Status Impaired   Arousal/Participation Alert   Attention Attends with cues to  "redirect   Orientation Level Oriented to person;Oriented to place;Disoriented to time;Disoriented to situation  (\"hospital\")   Memory Decreased recall of recent events;Decreased short term memory   Following Commands Follows one step commands with increased time or repetition   Comments Pt agreeable to PT.   Subjective   Subjective \"ok.\"   RLE Assessment   RLE Assessment X   Strength RLE   RLE Overall Strength 3+/5  (grossly assessed)   LLE Assessment   LLE Assessment X   Strength LLE   LLE Overall Strength 3+/5  (grossly assessed)   Light Touch   RLE Light Touch Grossly intact   LLE Light Touch Grossly intact   Bed Mobility   Supine to Sit 3  Moderate assistance   Additional items Assist x 2;HOB elevated;Bedrails;Increased time required;Verbal cues;LE management   Transfers   Sit to Stand 3  Moderate assistance   Additional items Assist x 2;Increased time required;Verbal cues   Stand to Sit 3  Moderate assistance   Additional items Assist x 2;Increased time required;Verbal cues   Additional Comments Pt performed 2 sit to stand transfers. Pt limited by reports of pain, RN aware and decline ambulation trial. A bed/chair swap was performed.   Balance   Static Sitting Fair   Dynamic Sitting Fair -   Static Standing Poor   Dynamic Standing Poor -   Endurance Deficit   Endurance Deficit Yes   Endurance Deficit Description decreased activity tolerance; pt requiring supplemental oxygen; pt was received on HFNC   Activity Tolerance   Activity Tolerance Patient limited by fatigue;Patient limited by pain   Medical Staff Made Aware OT Yanira  (Co-evaluation performed with OT secondary to complex medical condition of patient and regression of functional status from baseline. PT/OT goals were addressed separately.)   Nurse Made Aware RN Chacho   Assessment   Prognosis Good   Problem List Decreased strength;Decreased endurance;Impaired balance;Decreased mobility;Decreased cognition;Pain   Assessment Pt is 59 year old male seen for PT " evaluation s/p admit to [facility] on 6/3/2024 with Cardiac arrest (HCC). PT consulted to assess pt's functional mobility and discharge needs. Order placed for PT evaluation and treatment, with out of bed to chair order. Comorbidities affecting pt's physical performance at time of assessment include generalized convulsive epilepsy, acquired hypothyroidism, behavior concern in adult, ambulatory dysfunction, lactic acidosis, acute encephalopathy, dysphagia, closed traumatic fracture of ribs of right side with pneumothorax, and acute respiratory failure with hypoxia and hypercapnia. Prior to hospitalization, pt was requiring assist for mobility. Pt ambulates household distances with a walker. Pt resides with his caregiver and sister, in a two level house, but is able to stay on the first floor, with no steps to enter. Personal factors affecting pt at time of initial evaluation include inability to ambulate household distances, inability to navigate level surfaces without external assistance, unable to perform dynamic tasks in the community, limited home support, positive fall history, difficulty performing ADLs, and inability to perform IADLs. Please find objective findings from PT assessment regarding body systems outlined above with impairments and limitations including weakness, impaired balance, decreased endurance, pain, decreased activity tolerance, decreased functional mobility tolerance, fall risk, and decreased cognition. The following objective measures were performed on initial evaluation Barthel Index: 30/100, Modified Gui: 4 (moderate/severe disability), and AM-PAC 6-Clicks: 6/24. Pt's clinical presentation is currently unstable/unpredictable seen in pt's presentation of need for ongoing medical management/monitoring, pt is currently requiring supplemental oxygen, pt is a fall risk, and pt requires cues and assist of two for safety with functional mobility. Pt to benefit from continued PT  treatment to address deficits as defined above and maximize pt's level of function and independence with mobility. From a PT standpoint, recommendation at time of discharge would be level 2, moderate resource intensity in order to facilitate return to prior level of function.   Barriers to Discharge Inaccessible home environment;Decreased caregiver support   Goals   STG Expiration Date 06/15/24   Short Term Goal #1 In 10 days: Increase bilateral LE strength 1/2 grade to facilitate independent mobility, Perform all bed mobility tasks with min A of 1 to decrease caregiver burden, Perform all transfers with min A of 1 to improve independence, Increase all balance 1/2 grade to decrease risk for falls, and PT to see and establish goals for gait when appropriate   Plan   Treatment/Interventions Functional transfer training;LE strengthening/ROM;Therapeutic exercise;Endurance training;Cognitive reorientation;Patient/family training;Bed mobility;Continued evaluation;Spoke to nursing;OT   PT Frequency 3-5x/wk   Discharge Recommendation   Rehab Resource Intensity Level, PT II (Moderate Resource Intensity)   AM-PAC Basic Mobility Inpatient   Turning in Flat Bed Without Bedrails 1   Lying on Back to Sitting on Edge of Flat Bed Without Bedrails 1   Moving Bed to Chair 1   Standing Up From Chair Using Arms 1   Walk in Room 1   Climb 3-5 Stairs With Railing 1   Basic Mobility Inpatient Raw Score 6   Turning Head Towards Sound 4   Follow Simple Instructions 3   Low Function Basic Mobility Raw Score  13   Low Function Basic Mobility Standardized Score  20.14   [facility] Highest Level Of Mobility   -HL Goal 2: Bed activities/Dependent transfer   -HL Achieved 3: Sit at edge of bed   Modified [place] Scale   Modified [place] Scale 4   Barthel Index   Feeding 5   Bathing 0   Grooming Score 0   Dressing Score 5   Bladder Score 0   Bowels Score 10   Toilet Use Score 5   Transfers (Bed/Chair) Score 5   Mobility (Level Surface) Score  0   Stairs Score 0   Barthel Index Score 30     PT Evaluation Time: [id number]  Renee Arias, PT, DPT

## 2024-06-05 NOTE — RESPIRATORY THERAPY NOTE
06/05/24 0800   Respiratory Assessment   Assessment Type Assess only   General Appearance Awake   Respiratory Pattern Assisted   Chest Assessment Chest expansion symmetrical   Bilateral Breath Sounds Diminished;Coarse   Resp Comments titrated to 40% 40L   O2 Device opti   Non-Invasive Information   O2 Interface Device HFNC prongs   Non-Invasive Ventilation Mode HFNC (High flow)   SpO2 95 %   $ Pulse Oximetry Spot Check Charge Completed   Non-Invasive Settings   FiO2 (%) (S)  40   Flow (lpm) (S)  40   Temperature (Set) 31   Non-Invasive Readings   Skin Intervention Skin intact

## 2024-06-05 NOTE — PLAN OF CARE
Problem: PAIN - ADULT  Goal: Verbalizes/displays adequate comfort level or baseline comfort level  Description: Interventions:  - Encourage patient to monitor pain and request assistance  - Assess pain using appropriate pain scale  - Administer analgesics based on type and severity of pain and evaluate response  - Implement non-pharmacological measures as appropriate and evaluate response  - Consider cultural and social influences on pain and pain management  - Notify physician/advanced practitioner if interventions unsuccessful or patient reports new pain  Outcome: Progressing     Problem: INFECTION - ADULT  Goal: Absence or prevention of progression during hospitalization  Description: INTERVENTIONS:  - Assess and monitor for signs and symptoms of infection  - Monitor lab/diagnostic results  - Monitor all insertion sites, i.e. indwelling lines, tubes, and drains  - Monitor endotracheal if appropriate and nasal secretions for changes in amount and color  - [place] appropriate cooling/warming therapies per order  - Administer medications as ordered  - Instruct and encourage patient and family to use good hand hygiene technique  - Identify and instruct in appropriate isolation precautions for identified infection/condition  Outcome: Progressing     Problem: SAFETY ADULT  Goal: Patient will remain free of falls  Description: INTERVENTIONS:  - Educate patient/family on patient safety including physical limitations  - Instruct patient to call for assistance with activity   - Consult OT/PT to assist with strengthening/mobility   - Keep Call bell within reach  - Keep bed low and locked with side rails adjusted as appropriate  - Keep care items and personal belongings within reach  - Initiate and maintain comfort rounds  - Make Fall Risk Sign visible to staff  - Offer Toileting every 2 Hours, in advance of need  - Initiate/Maintain bed alarm  - Apply yellow socks and bracelet for high fall risk patients  - Consider  moving patient to room near nurses station  Outcome: Progressing  Goal: Maintain or return to baseline ADL function  Description: INTERVENTIONS:  -  Assess patient's ability to carry out ADLs; assess patient's baseline for ADL function and identify physical deficits which impact ability to perform ADLs (bathing, care of mouth/teeth, toileting, grooming, dressing, etc.)  - Assess/evaluate cause of self-care deficits   - Assess range of motion  - Assess patient's mobility; develop plan if impaired  - Assess patient's need for assistive devices and provide as appropriate  - Encourage maximum independence but intervene and supervise when necessary  - Involve family in performance of ADLs  - Assess for home care needs following discharge   - Consider OT consult to assist with ADL evaluation and planning for discharge  - Provide patient education as appropriate  Outcome: Progressing  Goal: Maintains/Returns to pre admission functional level  Description: INTERVENTIONS:  - Perform AM-PAC 6 Click Basic Mobility/ Daily Activity assessment daily.  - Set and communicate daily mobility goal to care team and patient/family/caregiver.   - Collaborate with rehabilitation services on mobility goals if consulted  - Reposition patient every 2 hours.  - Dangle patient 1 times a day  - Stand patient 1 times a day  - Out of bed to chair 1 times a day   - Out of bed for meals 1 times a day  - Out of bed for toileting  - Record patient progress and toleration of activity level   Outcome: Progressing     Problem: DISCHARGE PLANNING  Goal: Discharge to home or other facility with appropriate resources  Description: INTERVENTIONS:  - Identify barriers to discharge w/patient and caregiver  - Arrange for needed discharge resources and transportation as appropriate  - Identify discharge learning needs (meds, wound care, etc.)  - Arrange for interpretive services to assist at discharge as needed  - Refer to Case Management Department for  coordinating discharge planning if the patient needs post-hospital services based on physician/advanced practitioner order or complex needs related to functional status, cognitive ability, or social support system  Outcome: Progressing     Problem: Knowledge Deficit  Goal: Patient/family/caregiver demonstrates understanding of disease process, treatment plan, medications, and discharge instructions  Description: Complete learning assessment and assess knowledge base.  Interventions:  - Provide teaching at level of understanding  - Provide teaching via preferred learning methods  Outcome: Progressing     Problem: Nutrition/Hydration-ADULT  Goal: Nutrient/Hydration intake appropriate for improving, restoring or maintaining nutritional needs  Description: Monitor and assess patient's nutrition/hydration status for malnutrition. Collaborate with interdisciplinary team and initiate plan and interventions as ordered.  Monitor patient's weight and dietary intake as ordered or per policy. Utilize nutrition screening tool and intervene as necessary. Determine patient's food preferences and provide high-protein, high-caloric foods as appropriate.     INTERVENTIONS:  - Monitor oral intake, urinary output, labs, and treatment plans  - Assess nutrition and hydration status and recommend course of action  - Evaluate amount of meals eaten  - Assist patient with eating if necessary   - Allow adequate time for meals  - Recommend/ encourage appropriate diets, oral nutritional supplements, and vitamin/mineral supplements  - Order, calculate, and assess calorie counts as needed  - Recommend, monitor, and adjust tube feedings and TPN/PPN based on assessed needs  - Assess need for intravenous fluids  - Provide specific nutrition/hydration education as appropriate  - Include patient/family/caregiver in decisions related to nutrition  Outcome: Progressing     Problem: Prexisting or High Potential for Compromised Skin Integrity  Goal: Skin  integrity is maintained or improved  Description: INTERVENTIONS:  - Identify patients at risk for skin breakdown  - Assess and monitor skin integrity  - Assess and monitor nutrition and hydration status  - Monitor labs   - Assess for incontinence   - Turn and reposition patient  - Assist with mobility/ambulation  - Relieve pressure over bony prominences  - Avoid friction and shearing  - Provide appropriate hygiene as needed including keeping skin clean and dry  - Evaluate need for skin moisturizer/barrier cream  - Collaborate with interdisciplinary team   - Patient/family teaching  - Consider wound care consult   Outcome: Progressing

## 2024-06-05 NOTE — OCCUPATIONAL THERAPY NOTE
Occupational Therapy Evaluation      Jairon AMDERA Kourtney    6/5/2024    Patient Active Problem List   Diagnosis    Cataract    Ataxic cerebral palsy (HCC)    Type 2 diabetes mellitus with diabetic neuropathy, without long-term current use of insulin (HCC)    Generalized convulsive epilepsy (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)    Metabolic encephalopathy    Hyponatremia    Thrombocytopathia ([facility])    Hypomagnesemia    Social problem    Ambulatory dysfunction    Pressure ulcer of sacral region, stage 3 ([facility])    Closed nondisplaced fracture of proximal phalanx of left index finger with routine healing, subsequent encounter    Nondisplaced fracture of greater trochanter of right femur, subsequent encounter for closed fracture with routine healing    Urinary retention    Lactic acidosis    Acute encephalopathy    Cardiac arrest ([facility])    Dysphagia    Closed traumatic fracture of ribs of right side with pneumothorax    Acute respiratory failure with hypoxia and hypercapnia ([facility])       Past Medical History:   Diagnosis Date    ADRIANA (acute kidney injury) ([facility]) 9/24/2019    Bacteremia due to Gram-positive bacteria 9/7/2021    Buttock wound, left, subsequent encounter 11/5/2021    COVID-19     CP (cerebral palsy) ([facility])     Depression     Diabetes ([facility])     Disease of thyroid gland     Gait disorder     Gluteal abscess 9/6/2021    Hyperlipidemia     Hypertension     Kidney failure     Kidney stones     Moderate protein-calorie malnutrition ([facility]) 12/22/2021    Osteoporosis     Pressure injury of left buttock, stage 4 ([facility]) 3/9/2022    Psychiatric disorder     Scoliosis     Seizures ([facility])     Sepsis ([facility]) 9/25/2019    Thyroid disease     UTI (urinary tract infection)        Past  Surgical History:   Procedure Laterality Date    APPENDECTOMY      IR PICC PLACEMENT SINGLE LUMEN  9/13/2021    IR PICC PLACEMENT SINGLE LUMEN  9/16/2021 06/05/24 0842   OT Last Visit   OT Visit Date 06/05/24   Note Type   Note type Evaluation   Additional Comments Pt agreeable to OT eval. Upon arrival pt supine in bed with HOB elevated.   Pain Assessment   Pain Assessment Tool 0-10   Pain Score 8   Pain Location/Orientation Orientation: Right;Location: Rib Cage   Pain Onset/Description Onset: Ongoing;Frequency: Constant/Continuous   Hospital Pain Intervention(s) Ambulation/increased activity;Repositioned;Medication (See MAR)   Restrictions/Precautions   RLE Weight Bearing Per Order WBAT  (in 2/2024 per chart review)   Other Precautions Multiple lines;Telemetry;O2;Fall Risk;Pain;Chair Alarm;Bed Alarm;Cognitive  (Chest Tube)   Home Living   Type of Home House   Home Layout Two level;Performs ADLs on one level;Able to live on main level with bedroom/bathroom;Access  (0 DHARMESH)   Home Equipment Walker;Wheelchair-manual   Prior Function   Level of [place] Needs assistance with ADLs;Needs assistance with functional mobility;Needs assistance with IADLS   Lives With Family  (caregiver & sister)   Receives Help From Family;Home health   IADLs Family/Friend/Other provides transportation;Family/Friend/Other provides meals;Family/Friend/Other provides medication management   Falls in the last 6 months 1 to 4   Vocational Retired   Comments Pt is a poor historian. Information regarding home setup and PLOF obtained from chart reivew.   Lifestyle   Autonomy Per chart review, patient requires assistance with ADLs/IADLs and lives in a two story house with 1st floor set-up   Reciprocal Relationships Caregiver and Sister   Service to Others Retired   ADL   Eating Assistance 3  Moderate Assistance   Grooming Assistance 3  Moderate Assistance   UB Bathing Assistance 3  Moderate Assistance   LB Bathing Assistance 2  Maximal  "Assistance   UB Dressing Assistance 3  Moderate Assistance   LB Dressing Assistance 2  Maximal Assistance   Toileting Assistance  2  Maximal Assistance   Additional Comments ADL levels based on functional performance during OT eval   Bed Mobility   Supine to Sit 3  Moderate assistance   Additional items Assist x 2;HOB elevated;Bedrails;Increased time required;Verbal cues;LE management   Sit to Supine   (DNT: pt seated OOB in recliner at end of session)   Transfers   Sit to Stand 3  Moderate assistance   Additional items Assist x 2;Increased time required;Verbal cues   Stand to Sit 3  Moderate assistance   Additional items Assist x 2;Increased time required;Verbal cues   Additional Comments Pt completed 2 STS transfers from EOB. Pt limited by pain and declined further mobility. Bed-chair swap utilized to transfer pt OOB to recliner.   Balance   Static Sitting Fair +   Dynamic Sitting Fair -   Static Standing Poor +   Dynamic Standing Poor   Activity Tolerance   Activity Tolerance Patient limited by pain   RUE Assessment   RUE Assessment X  (~90 degree shoulder flexion noted during session; unable to formally assess MMT at this time)   LUE Assessment   LUE Assessment X  (~90 degree shoulder flexion noted during session; unable to formally assess MMT at this time)   Hand Function   Gross Motor Coordination Impaired   Fine Motor Coordination Impaired   Cognition   Overall Cognitive Status Impaired   Arousal/Participation Alert;Responsive;Cooperative   Attention Attends with cues to redirect   Orientation Level Oriented to person;Oriented to place;Disoriented to situation;Disoriented to time  (pt stated \"Hospital\")   Memory Decreased short term memory;Decreased recall of recent events;Decreased recall of precautions   Following Commands Follows one step commands with increased time or repetition   Assessment   Limitation Decreased ADL status;Decreased UE strength;Decreased Safe judgement during ADL;Decreased " cognition;Decreased endurance;Decreased self-care trans;Decreased high-level ADLs;Decreased fine motor control;Decreased UE ROM   Prognosis Good   Assessment Patient is a 59 y.o. male seen for OT evaluation s/p admit to [facility]  on 6/3/2024 w/Cardiac arrest (HCC). Commorbidities affecting patient's functional performance at time of assessment include: epilepsy, hypothyroidism, behavior concerns, ambulatory dysfunction, acute encephalopathy, dysphagia, acute respiratory failure, and closed traumatic fracture of R ribs. Orders placed for OT evaluation and treatment and out of bed to chair. Performed at least two patient identifiers during session including name and wristband.  Prior to admission, Per chart review, patient requires assistance with ADLs/IADLs and lives in a two story house with 1st floor set-up. Personal factors affecting patient at time of initial evaluation include: limited insight into deficits, decreased initiation and engagement, difficulty performing ADLs, and difficulty performing IADLs. Upon evaluation, patient requires moderate assist for UB ADLs, maximal assist for LB ADLs, transfers and functional ambulation in room and bathroom with moderate assist of 2, with the use of Rolling Walker.  Patient is oriented to name,, oriented to place,, disoriented to time,, and disoriented to situation, and presents with limited ability to recall information after a brief period of time (short term memory) / working memory .  Occupational performance is affected by the following deficits: orientation, limited active ROM, decreased muscle strength, impaired gross motor coordination, impaired fine motor coordination, dynamic sit/ stand balance deficit with poor standing tolerance time for self care and functional mobility, decreased activity tolerance, impaired memory, impaired problem solving, decreased safety awareness, increased pain, and delayed righting and equilibrium reactions. Patient  to benefit from continued Occupational Therapy treatment while in the hospital to address deficits as defined above and maximize level of functional independence with ADLs and functional mobility. Occupational Performance areas to address include: eating, grooming , bathing/ shower, dressing, toilet hygiene, transfer to all surfaces, and functional ambulation. From OT standpoint, recommendation at time of d/c would be Level II (moderate resource intensity).   Goals   Patient Goals to have less pain   Plan   Treatment Interventions ADL retraining;Functional transfer training;UE strengthening/ROM;Endurance training;Cognitive reorientation;Patient/family training;Equipment evaluation/education;Fine motor coordination activities;Compensatory technique education;Activityengagement   Goal Expiration Date 06/20/24   OT Treatment Day 0   OT Frequency 3-5x/wk   Discharge Recommendation   Rehab Resource Intensity Level, OT II (Moderate Resource Intensity)   AM-PAC Daily Activity Inpatient   Lower Body Dressing 1   Bathing 1   Toileting 1   Upper Body Dressing 2   Grooming 2   Eating 2   Daily Activity Raw Score 9   Turning Head Towards Sound 3   Follow Simple Instructions 2   Low Function Daily Activity Raw Score 14   Low Function Daily Activity Standardized Score  24.79   AM-PAC Applied Cognition Inpatient   Following a Speech/Presentation 2   Understanding Ordinary Conversation 3   Taking Medications 1   Remembering Where Things Are Placed or Put Away 1   Remembering List of 4-5 Errands 1   Taking Care of Complicated Tasks 1   Applied Cognition Raw Score 9   Applied Cognition Standardized Score 22.48   Modified Gui Scale   Modified Gui Scale 4   End of Consult   Patient Position at End of Consult Bedside chair;Bed/Chair alarm activated;All needs within reach   Nurse Communication Nurse aware of consult       GOALS:    *ADL transfers with CGA for inc'd independence with ADLs/purposeful tasks    *UB ADL with (S) for  inc'd independence with self cares    *LB ADL with Min (A) using AE prn for inc'd independence with self cares    *Toileting with Min (A) for clothing management and hygiene for return to PLOF with personal care    *Increase stand tolerance x2 m for inc'd tolerance with standing purposeful tasks    *Participate in 10m UE therex to increase overall stamina/activity tolerance for purposeful tasks    *Bed mobility- (S) for inc'd independence to manage own comfort and initiate EOB & OOB purposeful tasks    *Patient will verbalize 3 safety awareness/ principles to prevent falls in the home setting.     *Patient will verbalize and demonstrate use of energy conservation/deep breathing techniques and work simplification skills during functional activities with no verbal cues.     *Patient will increase OOB/sitting tolerance to 2-4 hours per day to increase participation in self-care and leisure tasks with no s/s of exertion.     *Patient will engage in ongoing cognitive assessment to assist with safe discharge planning/recommendations.     Yanira Lange OTSANTY, OTR/L

## 2024-06-05 NOTE — PLAN OF CARE
Recommendations:   Diet: NPO   Meds: non-oral if possible   Other Recommendations/ considerations: frequent oral care, MBS

## 2024-06-05 NOTE — PLAN OF CARE
Problem: PHYSICAL THERAPY ADULT  Goal: Performs mobility at highest level of function for planned discharge setting.  See evaluation for individualized goals.  Description: Treatment/Interventions: Functional transfer training, LE strengthening/ROM, Therapeutic exercise, Endurance training, Cognitive reorientation, Patient/family training, Bed mobility, Continued evaluation, Spoke to nursing, OT          See flowsheet documentation for full assessment, interventions and recommendations.  Note: Prognosis: Good  Problem List: Decreased strength, Decreased endurance, Impaired balance, Decreased mobility, Decreased cognition, Pain  Assessment: Pt is 59 year old male seen for PT evaluation s/p admit to [facility] on 6/3/2024 with Cardiac arrest (HCC). PT consulted to assess pt's functional mobility and discharge needs. Order placed for PT evaluation and treatment, with out of bed to chair order. Comorbidities affecting pt's physical performance at time of assessment include generalized convulsive epilepsy, acquired hypothyroidism, behavior concern in adult, ambulatory dysfunction, lactic acidosis, acute encephalopathy, dysphagia, closed traumatic fracture of ribs of right side with pneumothorax, and acute respiratory failure with hypoxia and hypercapnia. Prior to hospitalization, pt was requiring assist for mobility. Pt ambulates household distances with a walker. Pt resides with his caregiver and sister, in a two level house, but is able to stay on the first floor, with no steps to enter. Personal factors affecting pt at time of initial evaluation include inability to ambulate household distances, inability to navigate level surfaces without external assistance, unable to perform dynamic tasks in the community, limited home support, positive fall history, difficulty performing ADLs, and inability to perform IADLs. Please find objective findings from PT assessment regarding body systems outlined above with  impairments and limitations including weakness, impaired balance, decreased endurance, pain, decreased activity tolerance, decreased functional mobility tolerance, fall risk, and decreased cognition. The following objective measures were performed on initial evaluation Barthel Index: 30/100, Modified Gui: 4 (moderate/severe disability), and AM-PAC 6-Clicks: 6/24. Pt's clinical presentation is currently unstable/unpredictable seen in pt's presentation of need for ongoing medical management/monitoring, pt is currently requiring supplemental oxygen, pt is a fall risk, and pt requires cues and assist of two for safety with functional mobility. Pt to benefit from continued PT treatment to address deficits as defined above and maximize pt's level of function and independence with mobility. From a PT standpoint, recommendation at time of discharge would be level 2, moderate resource intensity in order to facilitate return to prior level of function.    Barriers to Discharge: Inaccessible home environment, Decreased caregiver support     Rehab Resource Intensity Level, PT: II (Moderate Resource Intensity)    See flowsheet documentation for full assessment.

## 2024-06-05 NOTE — PROCEDURES
Speech Pathology - Modified Barium Swallow Study    Patient Name: Jairon Oconnell    Today's Date: 6/5/2024     Problem List  Principal Problem:    Cardiac arrest (HCC)  Active Problems:    Generalized convulsive epilepsy (HCC)    Acquired hypothyroidism    Behavior concern in adult    Ambulatory dysfunction    Lactic acidosis    Acute encephalopathy    Dysphagia    Closed traumatic fracture of ribs of right side with pneumothorax    Acute respiratory failure with hypoxia and hypercapnia ([facility])      Past Medical History  Past Medical History:   Diagnosis Date    ADRIANA (acute kidney injury) ([facility]) 9/24/2019    Bacteremia due to Gram-positive bacteria 9/7/2021    Buttock wound, left, subsequent encounter 11/5/2021    COVID-19     CP (cerebral palsy) ([facility])     Depression     Diabetes ([facility])     Disease of thyroid gland     Gait disorder     Gluteal abscess 9/6/2021    Hyperlipidemia     Hypertension     Kidney failure     Kidney stones     Moderate protein-calorie malnutrition ([facility]) 12/22/2021    Osteoporosis     Pressure injury of left buttock, stage 4 ([facility]) 3/9/2022    Psychiatric disorder     Scoliosis     Seizures ([facility])     Sepsis ([facility]) 9/25/2019    Thyroid disease     UTI (urinary tract infection)        Past Surgical History  Past Surgical History:   Procedure Laterality Date    APPENDECTOMY      IR PICC PLACEMENT SINGLE LUMEN  9/13/2021    IR PICC PLACEMENT SINGLE LUMEN  9/16/2021     Assessment Summary:    Pt presents with moderate oropharyngeal dysphagia characterized by weak mastication w/ little-no bolus breakdown, poor insight/safety awareness and impulsivity.  Posterior spill (liquids>solids) with delayed laryngeal vestibule closure. Aspiration of both thin and nectar thick liquids prior to initiation of reflexive swallow.  Delayed cough, material is not ejected.  Deglutition of whole cracker evident x2 occasions. Modification of solid bolus size shows little benefit in improving breakdown prior to swallow.         Of note, patient with reduced ability to follow cues/commands and frequent movement.  Therefore view under fluoro limited overall.      Note: Images are available for review in PACS as desired.    Recommendations:   Recommended Diet: NPO   Recommended Form of Medications:  1 at a time w/ puree or honey - critical meds; non oral if possible     Aspiration precautions and compensatory swallowing strategies: upright posture and oral care   Consider referral to:  HH SLP tx  SLP Dysphagia therapy recommended: Y- SLP will continue to follow while in the acute care setting, trial puree/honey thick diet to start with gradual advancement to least restrictive as able x1-3.     Results Reviewed with: RN and CRNP   Pt/Family Education: initiated. Pt and caregivers would benefit from continued education.    General Information;  See initial evaluation     Prior MBS: none on file     Pt was viewed sitting upright in the lateral and AP positions. Due to concerns for patient safety / patient refusal, trials provided deviated from the MBSImP Validated Protocol. Pt was given 5-mL thin liquid x2, 20-mL cup sip thin, 5-mL nectar thick, 20-mL cup sip nectar thick, 5-mL honey thick, 5-mL pudding, and ½ cookie coated with 3-mL pudding.     Initial view observations/comments: Limited view secondary to body habitus      8-Point Penetration-Aspiration Scale   Thin liquid 7 - Material enters the airway, passes below the vocal folds, and is not ejected from the trachea despite effort     Nectar thick liquid 7 - Material enters the airway, passes below the vocal folds, and is not ejected from the trachea despite effort     Honey thick liquid 2 - Material enters the airway, remains above the vocal folds, and is ejected  from the airway   Puree (pudding) 1 - Material does not enter the airway   Solid 1 - Material does not enter the airway- view limited      Strategies and Efficacy: Small bite, softened solid - mastication remains incomplete  w/ limited pt awareness     Aspiration Response and Efficacy:  delayed coughing- material not ejected     MBS IMP Rating    ORAL Impairment  Compinent 1--Lip Closure  Judged at any point during the swallow.  4 - Escape beyond mid-chin    Component 2--Tongue Control During Bolus Hold  Judged on held liquid boluses only and prior to productive tongue movement.   3 - Posterior escape of greater than half of bolus    Component 3--Bolus Preparation/Mastication  Judged only during presentation of 1/2 shortbread cookie coated in pudding.   3 - Minimal chewing/mashing with majority of bolus unchewed    Component 4--Bolus Transport/Lingual Motion  Judged after first productive tongue movement for oral bolus transport.  3 - Repetitive/disorganized tongue motion    Component 5--Oral Residue  Judged after first swallow or after the last swallow of the sequential swallow task.  3 - Majority of bolus remaining   Location   A - Floor of Mouth and C - Tongue    Component 6--Initiation of Pharyngeal Swallow  Judged at first movement of the brisk superior-anterior hyoid trajectory.  3 - Bolus head in pyriforms      PHARYNGEAL Impairment  Component 7--Soft Palate Elevation  Judged during maximum displacement of soft palate.  0 - No bolus between the soft palate (SP)/pharyngeal wall (PW)    Component 8--Laryngeal Elevation  Judged when epiglottis is in its most horizontal position.  1 - Partial superior movement of thyroid cartilage/partial approximation of arytenoids to epiglottic petiole    Component 9--Anterior Hyoid Excursion  Judged at height of swallow/maximal anterior hyoid displacement.  1 - Partial anterior movement    Component 10--Epiglottic Movement  Judged at height of swallow/maximal anterior hyoid displacement.  1 - Partial inversion    Component 11--Laryngeal Vestibular Closure  Judged at height of swallow/maximal anterior hyoid displacement.  1 - Incomplete; narrow column air/contrast in laryngeal  vestibule    Component 12--Pharyngeal Stripping Wave  Judged during the full duration of the pharyngeal swallow.  1 - Present - diminished    Component 13--Pharyngeal Contraction  Judged in AP view at rest and throughout maximum movement of structures.  NA    Component 14--Pharyngoesophageal Segment Opening  Judged during maximum distension of PES and throughout opening and closure.  1 - Partial distension/partial duration; partial obstruction to flow    Component 15--Tongue Base (TB) Retraction  Judged during maximum retraction of the tongue base.  1 - Trace column of contrast or air between TB and PW    Component 16--Pharyngeal Residue  Judged after first swallow or after the last swallow of the sequential swallow task.  2 - Collection of residue within or on pharyngeal structures   Location   A - Tongue Base and B - Valleculae      ESOPHAGEAL Impairment  Component 17--Esophageal Clearance Upright Position  Judged in AP view during bolus transit through the oral cavity to the LES  Unable to assess - limited view         Cheri Green MS, CCC-SLP  Speech-Language Pathologist  [place] #[id number]  [place] #[id number]

## 2024-06-05 NOTE — RESPIRATORY THERAPY NOTE
06/05/24 1427   Respiratory Assessment   Assessment Type Assess only   General Appearance Alert;Awake   Respiratory Pattern Normal   Chest Assessment Chest expansion symmetrical   Bilateral Breath Sounds Diminished;Coarse   Resp Comments pt remains on MFNC 15 L   O2 Device MFNC   Oxygen Therapy/Pulse Ox   O2 Device Mid flow nasal cannula   Second O2 Device Mid flow nasal cannula   O2 Therapy Oxygen humidified   O2 Flow Rate (L/min) 15 L/min   SpO2 93 %   SpO2 Activity At Rest   $ Pulse Oximetry Spot Check Charge Completed

## 2024-06-05 NOTE — CASE MANAGEMENT
Case Management Assessment & Discharge Planning Note    Patient name Jairon Oconnell  Location ICU 10/ICU 10 MRN [id number]  : 1965 Date 2024       Current Admission Date: 6/3/2024  Current Admission Diagnosis:Cardiac arrest ([facility])   Patient Active Problem List    Diagnosis Date Noted Date Diagnosed    Lactic acidosis 2024     Acute encephalopathy 2024     Cardiac arrest ([facility]) 2024     Dysphagia 2024     Closed traumatic fracture of ribs of right side with pneumothorax 2024     Acute respiratory failure with hypoxia and hypercapnia ([facility]) 2024     Urinary retention 2024     Nondisplaced fracture of greater trochanter of right femur, subsequent encounter for closed fracture with routine healing 2024     Closed nondisplaced fracture of proximal phalanx of left index finger with routine healing, subsequent encounter 10/30/2023     Pressure ulcer of sacral region, stage 3 ([facility]) 2023     Ambulatory dysfunction 2022     Social problem 2021     Hypomagnesemia 2021     Thrombocytopathia ([facility]) 2020     Hyponatremia 2019     Metabolic encephalopathy 2019     Seborrheic dermatitis of scalp 2019     Nearsightedness 2019     Behavior concern in adult 2019     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98         Current admission status: Inpatient       Preferred Pharmacy:   [facility] [place] - [address]  [address] [address]  Phone: [phone] Fax: [phone]    [id number] - [place] - [address]  [address]  [place] [address]  Phone: [phone] Fax: [phone]    Primary Care Provider: HUNTER Brown    Primary Insurance: MEDICARE  Secondary Insurance: [place] MEDICAL ASSISTANCE    ASSESSMENT:  Active Health Care Proxies       Kemi Decker Health Care Representative - Sister   Primary Phone: [phone] (Home)                 Advance Directives  Does patient have a Health Care POA?: Yes  Does patient have Advance Directives?: Yes  Advance Directives: Power of  for health care  Primary Contact: amaris Huffman (Care Giver)   [phone]         Readmission Root Cause  30 Day Readmission: No    Patient Information  Admitted from:: Home  Mental Status: Alert  During Assessment patient was accompanied by: Not accompanied during assessment  Assessment information provided by:: Patient  Primary Caregiver: Other (Comment)  Caregiver's Name:: amaris Huffman (Care Giver)  Caregiver's Relationship to Patient:: Other (Specify) (Caregiver group home)  Caregiver's Telephone Number:: [phone]  Support Systems: Other (Comment) (caregiver)  County of Residence: [place]  What [place] you live in[place] [facility]  Home entry access options. Select all that apply.: No steps to enter home  Type of Current Residence: Group home  Upon entering residence, is there a bedroom on the main floor (no further steps)?: Yes  Upon entering residence, is there a bathroom on the main floor (no further steps)?: Yes  Living Arrangements: Other (Comment) (group home)  Is patient a ?: No    Activities of Daily Living Prior to Admission  Functional Status: Assistance  Completes ADLs independently?: No  Level of ADL  dependence: Assistance  Ambulates independently?: No  Level of ambulatory dependence: Assistance  Does patient use assisted devices?: No  Does patient currently own DME?: Yes  What DME does the patient currently own?: Bedside Commode, Quad Cane, Rollator, Shower Chair, Straight Cane, Walker  Does patient have a history of Outpatient Therapy (PT/OT)?: No  Does the patient have a history of Short-Term Rehab?: No  Does patient have a history of HHC?: Yes  Does patient currently have HHC?: No         Patient Information Continued  Income Source: SSI/SSD  Does patient have prescription coverage?: Yes  Does patient receive dialysis treatments?: No  Does patient have a history of substance abuse?: No    PHQ 2/9 Screening   Reviewed PHQ 2/9 Depression Screening Score?: No    Means of Transportation  Means of Transport to Appts:: Family transport      Social Determinants of Health (SDOH)      Flowsheet Row Most Recent Value   Housing Stability    In the last 12 months, was there a time when you were not able to pay the mortgage or rent on time? N   In the past 12 months, how many times have you moved where you were living? 1   At any time in the past 12 months, were you homeless or living in a shelter (including now)? N   Transportation Needs    In the past 12 months, has lack of transportation kept you from medical appointments or from getting medications? no   In the past 12 months, has lack of transportation kept you from meetings, work, or from getting things needed for daily living? No   Food Insecurity    Within the past 12 months, you worried that your food would run out before you got the money to buy more. Never true   Within the past 12 months, the food you bought just didn't last and you didn't have money to get more. Never true   Utilities    In the past 12 months has the electric, gas, oil, or water company threatened to shut off services in your home? No            DISCHARGE DETAILS:    Discharge planning  discussed with:: Pt caregiver Betty  Freedom of Choice: Yes  Comments - Freedom of Choice: CM called and spoke with Betty who is pt caregiver. As per Betty, pt jay lives in the [facility] and dc plans is to return. FOC discussed with caregiver. CM continues to follow and assist with pt dc plans.  CM contacted family/caregiver?: Yes  Were Treatment Team discharge recommendations reviewed with patient/caregiver?: Yes  Did patient/caregiver verbalize understanding of patient care needs?: Yes  Were patient/caregiver advised of the risks associated with not following Treatment Team discharge recommendations?: Yes    Contacts  Patient Contacts: Kemi Decker  Relationship to Patient:: Family  Contact Method: Phone  Phone Number: [phone]  Reason/Outcome: Continuity of Care, Emergency Contact, Referral, Discharge Planning    Requested Home Health Care         Is the patient interested in HHC at discharge?: Yes  Home Health Discipline requested:: Nursing, Occupational Therapy, Physical Therapy    DME Referral Provided  Referral made for DME?: No    Other Referral/Resources/Interventions Provided:  Interventions: HHC    Would you like to participate in our [facility] service program?  : No - Declined    Treatment Team Recommendation: Short Term Rehab, Home with Home Health Care  Discharge Destination Plan:: Home with Home Health Care, Short Term Rehab  Transport at Discharge : Family

## 2024-06-06 ENCOUNTER — APPOINTMENT (INPATIENT)
Dept: RADIOLOGY | Facility: HOSPITAL | Age: 59
DRG: 208 | End: 2024-06-06
Payer: MEDICARE

## 2024-06-06 PROBLEM — G93.41 ACUTE METABOLIC ENCEPHALOPATHY: Status: ACTIVE | Noted: 2024-06-03

## 2024-06-06 LAB
ANION GAP SERPL CALCULATED.3IONS-SCNC: 7 MMOL/L (ref 4–13)
BASOPHILS # BLD MANUAL: 0 THOUSAND/UL (ref 0–0.1)
BASOPHILS NFR MAR MANUAL: 0 % (ref 0–1)
BUN SERPL-MCNC: 31 MG/DL (ref 5–25)
CA-I BLD-SCNC: 1.13 MMOL/L (ref 1.12–1.32)
CALCIUM SERPL-MCNC: 8.6 MG/DL (ref 8.4–10.2)
CHLORIDE SERPL-SCNC: 109 MMOL/L (ref 96–108)
CO2 SERPL-SCNC: 25 MMOL/L (ref 21–32)
CREAT SERPL-MCNC: 0.75 MG/DL (ref 0.6–1.3)
EOSINOPHIL # BLD MANUAL: 0.08 THOUSAND/UL (ref 0–0.4)
EOSINOPHIL NFR BLD MANUAL: 1 % (ref 0–6)
ERYTHROCYTE [DISTWIDTH] IN BLOOD BY AUTOMATED COUNT: 14 % (ref 11.6–15.1)
GFR SERPL CREATININE-BSD FRML MDRD: 101 ML/MIN/1.73SQ M
GLUCOSE SERPL-MCNC: 115 MG/DL (ref 65–140)
GLUCOSE SERPL-MCNC: 127 MG/DL (ref 65–140)
GLUCOSE SERPL-MCNC: 135 MG/DL (ref 65–140)
GLUCOSE SERPL-MCNC: 136 MG/DL (ref 65–140)
HCT VFR BLD AUTO: 32.8 % (ref 36.5–49.3)
HGB BLD-MCNC: 11 G/DL (ref 12–17)
LYMPHOCYTES # BLD AUTO: 1.76 THOUSAND/UL (ref 0.6–4.47)
LYMPHOCYTES # BLD AUTO: 20 % (ref 14–44)
MAGNESIUM SERPL-MCNC: 2.1 MG/DL (ref 1.9–2.7)
MCH RBC QN AUTO: 32.3 PG (ref 26.8–34.3)
MCHC RBC AUTO-ENTMCNC: 33.5 G/DL (ref 31.4–37.4)
MCV RBC AUTO: 96 FL (ref 82–98)
METAMYELOCYTE ABSOLUTE CT: 0.34 THOUSAND/UL (ref 0–0.1)
METAMYELOCYTES NFR BLD MANUAL: 4 % (ref 0–1)
MONOCYTES # BLD AUTO: 0.5 THOUSAND/UL (ref 0–1.22)
MONOCYTES NFR BLD: 6 % (ref 4–12)
NEUTROPHILS # BLD MANUAL: 5.71 THOUSAND/UL (ref 1.85–7.62)
NEUTS BAND NFR BLD MANUAL: 18 % (ref 0–8)
NEUTS SEG NFR BLD AUTO: 50 % (ref 43–75)
PHOSPHATE SERPL-MCNC: 2.1 MG/DL (ref 2.7–4.5)
PLATELET # BLD AUTO: 117 THOUSANDS/UL (ref 149–390)
PLATELET BLD QL SMEAR: ABNORMAL
PMV BLD AUTO: 12.2 FL (ref 8.9–12.7)
POTASSIUM SERPL-SCNC: 3.9 MMOL/L (ref 3.5–5.3)
PROCALCITONIN SERPL-MCNC: 1.86 NG/ML
RBC # BLD AUTO: 3.41 MILLION/UL (ref 3.88–5.62)
RBC MORPH BLD: PRESENT
SCHISTOCYTES BLD QL SMEAR: PRESENT
SMUDGE CELLS BLD QL SMEAR: PRESENT
SODIUM SERPL-SCNC: 141 MMOL/L (ref 135–147)
VARIANT LYMPHS # BLD AUTO: 1 %
WBC # BLD AUTO: 8.4 THOUSAND/UL (ref 4.31–10.16)

## 2024-06-06 PROCEDURE — 71045 X-RAY EXAM CHEST 1 VIEW: CPT

## 2024-06-06 PROCEDURE — 82948 REAGENT STRIP/BLOOD GLUCOSE: CPT

## 2024-06-06 PROCEDURE — 85027 COMPLETE CBC AUTOMATED: CPT | Performed by: NURSE PRACTITIONER

## 2024-06-06 PROCEDURE — 84100 ASSAY OF PHOSPHORUS: CPT | Performed by: NURSE PRACTITIONER

## 2024-06-06 PROCEDURE — 85007 BL SMEAR W/DIFF WBC COUNT: CPT | Performed by: NURSE PRACTITIONER

## 2024-06-06 PROCEDURE — 83735 ASSAY OF MAGNESIUM: CPT | Performed by: NURSE PRACTITIONER

## 2024-06-06 PROCEDURE — 92526 ORAL FUNCTION THERAPY: CPT

## 2024-06-06 PROCEDURE — 87040 BLOOD CULTURE FOR BACTERIA: CPT

## 2024-06-06 PROCEDURE — 80048 BASIC METABOLIC PNL TOTAL CA: CPT | Performed by: NURSE PRACTITIONER

## 2024-06-06 PROCEDURE — 84145 PROCALCITONIN (PCT): CPT | Performed by: NURSE PRACTITIONER

## 2024-06-06 PROCEDURE — 82330 ASSAY OF CALCIUM: CPT | Performed by: NURSE PRACTITIONER

## 2024-06-06 PROCEDURE — 99233 SBSQ HOSP IP/OBS HIGH 50: CPT | Performed by: ANESTHESIOLOGY

## 2024-06-06 PROCEDURE — 94760 N-INVAS EAR/PLS OXIMETRY 1: CPT

## 2024-06-06 RX ORDER — METRONIDAZOLE 500 MG/100ML
500 INJECTION, SOLUTION INTRAVENOUS EVERY 8 HOURS
Status: DISCONTINUED | OUTPATIENT
Start: 2024-06-06 | End: 2024-06-08

## 2024-06-06 RX ORDER — FUROSEMIDE 10 MG/ML
20 INJECTION INTRAMUSCULAR; INTRAVENOUS ONCE
Status: COMPLETED | OUTPATIENT
Start: 2024-06-06 | End: 2024-06-06

## 2024-06-06 RX ADMIN — GABAPENTIN 400 MG: 400 CAPSULE ORAL at 21:11

## 2024-06-06 RX ADMIN — Medication 2000 MG: at 16:55

## 2024-06-06 RX ADMIN — SODIUM CHLORIDE, SODIUM GLUCONATE, SODIUM ACETATE, POTASSIUM CHLORIDE, MAGNESIUM CHLORIDE, SODIUM PHOSPHATE, DIBASIC, AND POTASSIUM PHOSPHATE 125 ML/HR: .53; .5; .37; .037; .03; .012; .00082 INJECTION, SOLUTION INTRAVENOUS at 04:21

## 2024-06-06 RX ADMIN — ACETAMINOPHEN 1000 MG: 10 INJECTION INTRAVENOUS at 10:34

## 2024-06-06 RX ADMIN — ACETAMINOPHEN 1000 MG: 10 INJECTION INTRAVENOUS at 18:49

## 2024-06-06 RX ADMIN — METRONIDAZOLE 500 MG: 500 INJECTION, SOLUTION INTRAVENOUS at 17:27

## 2024-06-06 RX ADMIN — GABAPENTIN 400 MG: 400 CAPSULE ORAL at 17:25

## 2024-06-06 RX ADMIN — GABAPENTIN 400 MG: 400 CAPSULE ORAL at 09:08

## 2024-06-06 RX ADMIN — ENOXAPARIN SODIUM 40 MG: 40 INJECTION SUBCUTANEOUS at 09:07

## 2024-06-06 RX ADMIN — CHLORHEXIDINE GLUCONATE 0.12% ORAL RINSE 15 ML: 1.2 LIQUID ORAL at 21:11

## 2024-06-06 RX ADMIN — SENNOSIDES AND DOCUSATE SODIUM 2 TABLET: 50; 8.6 TABLET ORAL at 17:25

## 2024-06-06 RX ADMIN — Medication 20 MG: at 09:08

## 2024-06-06 RX ADMIN — LEVOTHYROXINE SODIUM 150 MCG: 0.15 TABLET ORAL at 09:08

## 2024-06-06 RX ADMIN — PRAVASTATIN SODIUM 80 MG: 80 TABLET ORAL at 17:25

## 2024-06-06 RX ADMIN — ZONISAMIDE 300 MG: 100 CAPSULE ORAL at 21:12

## 2024-06-06 RX ADMIN — POTASSIUM PHOSPHATE, MONOBASIC AND POTASSIUM PHOSPHATE, DIBASIC 12 MMOL: 224; 236 INJECTION, SOLUTION, CONCENTRATE INTRAVENOUS at 09:04

## 2024-06-06 RX ADMIN — SODIUM CHLORIDE, SODIUM GLUCONATE, SODIUM ACETATE, POTASSIUM CHLORIDE, MAGNESIUM CHLORIDE, SODIUM PHOSPHATE, DIBASIC, AND POTASSIUM PHOSPHATE 125 ML/HR: .53; .5; .37; .037; .03; .012; .00082 INJECTION, SOLUTION INTRAVENOUS at 13:24

## 2024-06-06 RX ADMIN — LACOSAMIDE 150 MG: 100 TABLET, FILM COATED ORAL at 09:08

## 2024-06-06 RX ADMIN — LEVETIRACETAM 1500 MG: 500 TABLET, FILM COATED ORAL at 21:12

## 2024-06-06 RX ADMIN — SENNOSIDES AND DOCUSATE SODIUM 2 TABLET: 50; 8.6 TABLET ORAL at 09:08

## 2024-06-06 RX ADMIN — LACOSAMIDE 200 MG: 100 TABLET, FILM COATED ORAL at 21:12

## 2024-06-06 RX ADMIN — CHLORHEXIDINE GLUCONATE 0.12% ORAL RINSE 15 ML: 1.2 LIQUID ORAL at 09:07

## 2024-06-06 RX ADMIN — LEVETIRACETAM 1500 MG: 500 TABLET, FILM COATED ORAL at 09:08

## 2024-06-06 RX ADMIN — ACETAMINOPHEN 1000 MG: 10 INJECTION INTRAVENOUS at 03:39

## 2024-06-06 RX ADMIN — FUROSEMIDE 20 MG: 10 INJECTION, SOLUTION INTRAMUSCULAR; INTRAVENOUS at 21:11

## 2024-06-06 RX ADMIN — LIDOCAINE 5% 2 PATCH: 700 PATCH TOPICAL at 09:05

## 2024-06-06 RX ADMIN — LORATADINE 10 MG: 10 TABLET ORAL at 09:08

## 2024-06-06 NOTE — ASSESSMENT & PLAN NOTE
S/p self extubation 6/4  With residual AHRF in setting of rib fractures, likely aspiration  Weaned from high flow to midflow nasal cannula, continue to wean as tolerated   Wean as able to maintain Spo2 >92%  Aggressive pain control

## 2024-06-06 NOTE — PLAN OF CARE
Problem: PAIN - ADULT  Goal: Verbalizes/displays adequate comfort level or baseline comfort level  Description: Interventions:  - Encourage patient to monitor pain and request assistance  - Assess pain using appropriate pain scale  - Administer analgesics based on type and severity of pain and evaluate response  - Implement non-pharmacological measures as appropriate and evaluate response  - Consider cultural and social influences on pain and pain management  - Notify physician/advanced practitioner if interventions unsuccessful or patient reports new pain  Outcome: Progressing     Problem: INFECTION - ADULT  Goal: Absence or prevention of progression during hospitalization  Description: INTERVENTIONS:  - Assess and monitor for signs and symptoms of infection  - Monitor lab/diagnostic results  - Monitor all insertion sites, i.e. indwelling lines, tubes, and drains  - Monitor endotracheal if appropriate and nasal secretions for changes in amount and color  - [place] appropriate cooling/warming therapies per order  - Administer medications as ordered  - Instruct and encourage patient and family to use good hand hygiene technique  - Identify and instruct in appropriate isolation precautions for identified infection/condition  Outcome: Progressing     Problem: SAFETY ADULT  Goal: Patient will remain free of falls  Description: INTERVENTIONS:  - Educate patient/family on patient safety including physical limitations  - Instruct patient to call for assistance with activity   - Consult OT/PT to assist with strengthening/mobility   - Keep Call bell within reach  - Keep bed low and locked with side rails adjusted as appropriate  - Keep care items and personal belongings within reach  - Initiate and maintain comfort rounds  - Make Fall Risk Sign visible to staff  - Offer Toileting every 2 Hours, in advance of need  - Initiate/Maintain bed alarm  - Apply yellow socks and bracelet for high fall risk patients  - Consider  moving patient to room near nurses station  Outcome: Progressing  Goal: Maintain or return to baseline ADL function  Description: INTERVENTIONS:  -  Assess patient's ability to carry out ADLs; assess patient's baseline for ADL function and identify physical deficits which impact ability to perform ADLs (bathing, care of mouth/teeth, toileting, grooming, dressing, etc.)  - Assess/evaluate cause of self-care deficits   - Assess range of motion  - Assess patient's mobility; develop plan if impaired  - Assess patient's need for assistive devices and provide as appropriate  - Encourage maximum independence but intervene and supervise when necessary  - Involve family in performance of ADLs  - Assess for home care needs following discharge   - Consider OT consult to assist with ADL evaluation and planning for discharge  - Provide patient education as appropriate  Outcome: Progressing  Goal: Maintains/Returns to pre admission functional level  Description: INTERVENTIONS:  - Perform AM-PAC 6 Click Basic Mobility/ Daily Activity assessment daily.  - Set and communicate daily mobility goal to care team and patient/family/caregiver.   - Collaborate with rehabilitation services on mobility goals if consulted  - Reposition patient every 2 hours.  - Dangle patient 1 times a day  - Stand patient 1 times a day  - Out of bed to chair 1 times a day   - Out of bed for meals 1 times a day  - Out of bed for toileting  - Record patient progress and toleration of activity level   Outcome: Progressing     Problem: DISCHARGE PLANNING  Goal: Discharge to home or other facility with appropriate resources  Description: INTERVENTIONS:  - Identify barriers to discharge w/patient and caregiver  - Arrange for needed discharge resources and transportation as appropriate  - Identify discharge learning needs (meds, wound care, etc.)  - Arrange for interpretive services to assist at discharge as needed  - Refer to Case Management Department for  coordinating discharge planning if the patient needs post-hospital services based on physician/advanced practitioner order or complex needs related to functional status, cognitive ability, or social support system  Outcome: Progressing     Problem: Knowledge Deficit  Goal: Patient/family/caregiver demonstrates understanding of disease process, treatment plan, medications, and discharge instructions  Description: Complete learning assessment and assess knowledge base.  Interventions:  - Provide teaching at level of understanding  - Provide teaching via preferred learning methods  Outcome: Progressing     Problem: Nutrition/Hydration-ADULT  Goal: Nutrient/Hydration intake appropriate for improving, restoring or maintaining nutritional needs  Description: Monitor and assess patient's nutrition/hydration status for malnutrition. Collaborate with interdisciplinary team and initiate plan and interventions as ordered.  Monitor patient's weight and dietary intake as ordered or per policy. Utilize nutrition screening tool and intervene as necessary. Determine patient's food preferences and provide high-protein, high-caloric foods as appropriate.     INTERVENTIONS:  - Monitor oral intake, urinary output, labs, and treatment plans  - Assess nutrition and hydration status and recommend course of action  - Evaluate amount of meals eaten  - Assist patient with eating if necessary   - Allow adequate time for meals  - Recommend/ encourage appropriate diets, oral nutritional supplements, and vitamin/mineral supplements  - Order, calculate, and assess calorie counts as needed  - Recommend, monitor, and adjust tube feedings and TPN/PPN based on assessed needs  - Assess need for intravenous fluids  - Provide specific nutrition/hydration education as appropriate  - Include patient/family/caregiver in decisions related to nutrition  Outcome: Progressing     Problem: Prexisting or High Potential for Compromised Skin Integrity  Goal: Skin  integrity is maintained or improved  Description: INTERVENTIONS:  - Identify patients at risk for skin breakdown  - Assess and monitor skin integrity  - Assess and monitor nutrition and hydration status  - Monitor labs   - Assess for incontinence   - Turn and reposition patient  - Assist with mobility/ambulation  - Relieve pressure over bony prominences  - Avoid friction and shearing  - Provide appropriate hygiene as needed including keeping skin clean and dry  - Evaluate need for skin moisturizer/barrier cream  - Collaborate with interdisciplinary team   - Patient/family teaching  - Consider wound care consult   Outcome: Progressing

## 2024-06-06 NOTE — SPEECH THERAPY NOTE
Speech Language/Pathology     Speech/Language Pathology Progress Note     Patient Name: Jairon Oconnell    Today's Date: 6/6/2024     Problem List  Principal Problem:    Cardiac arrest (HCC)  Active Problems:    Generalized convulsive epilepsy (HCC)    Acquired hypothyroidism    Behavior concern in adult    Ambulatory dysfunction    Lactic acidosis    Acute metabolic encephalopathy    Dysphagia    Closed traumatic fracture of ribs of right side with pneumothorax    Acute respiratory failure with hypoxia and hypercapnia (HCC)        Recommendations:   Diet: NPO   Meds: crushed with puree - critical meds. Non oral if possible  Feeding Assistance: 1:1 full feed  Frequent Oral care: 2-4x/day  Aspiration precautions and compensatory swallowing strategies: upright posture, PO meds only when fully alert, SLOW rate of intake; PO in 1/2 tsp quantities, monitor respiratory status --- HOLD if RR>35, drop in saturation, s/s of aspiration   Other Recommendations/ considerations: ST will continue to follow closely for advancement to oral diet and modify/adjust as indicated x2-4     Subjective:  Patient received awake, alert, upright in bed; no concerns w/ PO meds per nursing.    Objective:  The following consistencies were tested: puree, honey by 1/2 tsp quantities. Patient took 4oz honey, 4oz puree; all trials clinician fed - slow rate.  Patient maintains O2 saturation 92% while on HFNC 50L at 50%.  Did drop to 91% very briefly on a single occasion.  No overt s/s of aspiration or distress. Vocal quality noted to remain clear/ breathy with modified textures provided today.    Noted chronic cough prior to PO offerings; O2 saturation drops to 90% with progressed cough following conclusion of tx session.         Assessment:  Oropharyngeal dysphagia; appears worsened by behavioral feeding (impulsivity), oral dysphagia w/ poor mastication/bolus breakdown and now delayed swallow w/ incomplete airway closure - potentially related to  self-extubation.      Given frequent cough, signs of aspiration, in the absence of PO; questionable chronic aspiration of secretions given MBS results (aspiration of thin liq), or retrograde flow. Of note, pt previously on 15L MFNC for swallowing study, now back on HFNC.  D/w CRNP, recommend to hold oral diet until respiratory status better stabilized.      Plan:  NPO x critical meds crushed w/ puree  Aspiration precautions, oral care, suction set up  ST follow-up x2-4      Cheri Green MS, CCC-SLP  Speech-Language Pathologist  [place] #[id number]  [place] #[id number]

## 2024-06-06 NOTE — PROGRESS NOTES
"[facility]  Progress Note  Name: Jairon Oconnell I  MRN: [id number]  Unit/Bed#: ICU 10 I Date of Admission: 6/3/2024   Date of Service: 6/6/2024 I Hospital Day: 3    Assessment & Plan   * Cardiac arrest (HCC)  Assessment & Plan  Thought to be secondary to hypoxia secondary to airway obstruction in the setting of chocking  Now post self extubation, purposeful movement  WIth residual AHRF, 2/2 rib fractures vs aspiration as below  ECHO- EF 55%  Lactic acidosis, cardiac enzymes all decreasing  Currently HD stable aside from hypoxia    Acute respiratory failure with hypoxia and hypercapnia (HCC)  Assessment & Plan  S/p self extubation 6/4  With residual AHRF in setting of rib fractures, likely aspiration  Weaned from high flow to midflow nasal cannula, continue to wean as tolerated   Wean as able to maintain Spo2 >92%  Aggressive pain control    Closed traumatic fracture of ribs of right side with pneumothorax  Assessment & Plan  S/p 10 Fr right chest tube  On water-seal overnight, repeat xray this AM, if no recurrent pneumothorax, consider discontinuing   Aggressive, multi-modal pain control    Dysphagia  Assessment & Plan  Speech recommending NPO     Acute encephalopathy  Assessment & Plan  Now s/p self extubation  Able to nod appropriately to question, but majority of speech is incomprehensible  Serial neuro checks  CAM ICU  Delerium precautions    Lactic acidosis  Assessment & Plan  Generous volume resuscitation  Improved    Ambulatory dysfunction  Assessment & Plan  PT/OT when appropriate  Fall precautions    Behavior concern in adult  Assessment & Plan  Holding home serouqel  Consider restarting when O2 requirements improved     Acquired hypothyroidism  Assessment & Plan  Continue home levothyroxine  Check TSH with reflex T4    Generalized convulsive epilepsy (HCC)  Assessment & Plan  Continue home medications  Holding home Depakote as patient reportedly requires \"brand name\" Depakote       "       Disposition: Stepdown Level 1    ICU Core Measures     A: Assess, Prevent, and Manage Pain Has pain been assessed? Yes  Need for changes to pain regimen? No   B: Both SAT/SAT  N/A   C: Choice of Sedation RASS Goal: 0 Alert and Calm  Need for changes to sedation or analgesia regimen? No   D: Delirium CAM-ICU: Unable to perform secondary to Dementia or other chronic cognitive dysfunction   E: Early Mobility  Plan for early mobility? Yes   F: Family Engagement Plan for family engagement today? Yes       Review of Invasive Devices:            Prophylaxis:  VTE VTE covered by:  enoxaparin, Subcutaneous, 40 mg at 06/05/24 1019       Stress Ulcer  covered byomeprazole (PRILOSEC) suspension 2 mg/mL [[id number]]         Significant 24hr Events     24hr events: weaned from high flow nasal cannula to midflow and tolerating overnight.  CT placed on water seal overnight.      Subjective   Review of Systems: Review of Systems   Unable to perform ROS: Other        Objective                            Vitals I/O      Most Recent Min/Max in 24hrs   Temp 97.6 °F (36.4 °C) Temp  Min: 97.6 °F (36.4 °C)  Max: 99.7 °F (37.6 °C)   Pulse 94 Pulse  Min: 91  Max: 102   Resp (!) 28 Resp  Min: 18  Max: 32   /80 BP  Min: 146/69  Max: 166/86   O2 Sat 90 % SpO2  Min: 85 %  Max: 98 %      Intake/Output Summary (Last 24 hours) at 6/6/2024 0143  Last data filed at 6/5/2024 2200  Gross per 24 hour   Intake 2695.85 ml   Output 1098 ml   Net 1597.85 ml       Diet NPO; Sips with meds    Invasive Monitoring           Physical Exam   Physical Exam  Eyes:      General: Lids are normal.      Extraocular Movements: Extraocular movements intact.      Conjunctiva/sclera: Conjunctivae normal.   Skin:     General: Skin is warm and dry.   HENT:      Head: Normocephalic and atraumatic.   Neck:      Trachea: Trachea normal.   Cardiovascular:      Rate and Rhythm: Tachycardia present.      Pulses: Normal pulses.   Musculoskeletal:      Cervical back:  Normal range of motion.      Right lower leg: No edema.      Left lower leg: No edema.   Abdominal:      Palpations: Abdomen is soft.      Tenderness: There is no abdominal tenderness.       Constitutional:       General: He is awake.      Appearance: He is overweight. He is ill-appearing.      Interventions: Nasal cannula in place.   Pulmonary:      Breath sounds: Rhonchi present.   Neurological:      Mental Status: He is alert.      GCS: GCS eye subscore is 4. GCS verbal subscore is 3. GCS motor subscore is 5.      Sensory: Sensation is intact.            Diagnostic Studies      EKG: ST   Imaging:   FL barium swallow video w speech   Final Result      XR chest portable ICU   Final Result   No pneumothorax visualized.   Tubes and lines, as above. Consider retraction of the endotracheal tube by 1.6 cm.   Perihilar patchy airspace opacities. Consolidations in the posterior medial lower lobes.            Workstation performed: [id number]         CT head without contrast   Final Result      No acute intracranial abnormality.                  Workstation performed: [id number]         CT chest with contrast   Final Result      Bilateral lower lobe airspace consolidations as well as tree-in-bud and groundglass opacities most conspicuous at the right upper lobe compatible with bronchopneumonia      Numerous bilateral anterior rib fractures.               Workstation performed: [id number]         XR chest 1 view portable   ED Interpretation   Reexpansion of R lung, adequate positioning of R chest tube.       Final Result      Since Ramona 3, 2000 2420:42:   1.  New right chest tube with decrease size of the right pneumothorax. Assessment of the pneumothorax is limited due to and overlying pacer pad, though on the subsequent CT chest, the pneumothorax is trace.   2.  Right lower lobe atelectasis.   3.  Persistent slight high positioning of enteric tube with sidehole at the expected position of the diaphragmatic hiatus.  Advancement by approximately 4 cm is advised.         I personally discussed this study with Dr. Carias on 6/4/2024 10:56 AM.            Workstation performed: [id number]         XR chest 1 view portable   ED Interpretation   Large R PTX  ETT at demetrius  OGT subdiaphragmatic.       Final Result      Large right pneumothorax.      Endotracheal tube at the level of the demetrius, recommend retraction.      Enteric tube with tip at the level of the gastroesophageal junction, sideport in the lower esophagus.   Recommend advancement.      Multiple right anterior minimally displaced rib fractures.      Findings concur with the preliminary report by the referring clinician already in PACS and/or our electronic record EPIC.               Workstation performed: [id number]         XR chest portable ICU    (Results Pending)         Medications:  Scheduled PRN   acetaminophen, 1,000 mg, Q8H  chlorhexidine, 15 mL, Q12H RONALD  enoxaparin, 40 mg, Daily  gabapentin, 400 mg, TID  insulin lispro, 1-6 Units, Q6H RONALD  lacosamide, 150 mg, QAM  lacosamide, 200 mg, HS  levETIRAcetam, 1,500 mg, Q12H RONALD  levothyroxine, 150 mcg, Daily  lidocaine, 2 patch, Daily  loratadine, 10 mg, Daily  omeprazole (PRILOSEC) suspension 2 mg/mL, 20 mg, Daily  pravastatin, 80 mg, Daily With Dinner  senna-docusate sodium, 2 tablet, BID  zonisamide, 300 mg, HS      ondansetron, 4 mg, Q6H PRN  oxyCODONE, 5 mg, Q6H PRN   Or  oxyCODONE, 10 mg, Q6H PRN       Continuous    multi-electrolyte, 125 mL/hr, Last Rate: 125 mL/hr (06/05/24 1954)         Labs:    CBC    Recent Labs     06/04/24  0553 06/05/24  0520   WBC 7.14 8.23   HGB 12.5 10.8*   HCT 38.2 31.9*   * 112*     BMP    Recent Labs     06/04/24  0553 06/05/24  0520   SODIUM 141 141   K 4.4 3.7   * 108   CO2 22 26   AGAP 7 7   BUN 28* 33*   CREATININE 0.63 0.86   CALCIUM 7.5* 8.3*       Coags    No recent results     Additional Electrolytes  Recent Labs     06/04/24  0553 06/05/24  0520   MG 1.3*  --     PHOS 3.2 2.9   CAIONIZED  --  1.11*          Blood Gas    No recent results  No recent results LFTs  Recent Labs     06/04/24  0553   ALT 48   AST 29   ALKPHOS 37   ALB 3.2*   TBILI 0.28       Infectious  Recent Labs     06/05/24  0520   PROCALCITONI 2.28*     Glucose  Recent Labs     06/04/24  0553 06/05/24  0520   GLUC 160* 130               HUNTER Colby

## 2024-06-06 NOTE — ASSESSMENT & PLAN NOTE
S/p 10 Fr right chest tube  On water-seal overnight, repeat xray this AM, if no recurrent pneumothorax, consider discontinuing   Aggressive, multi-modal pain control

## 2024-06-06 NOTE — RESPIRATORY THERAPY NOTE
06/06/24 0301   Respiratory Assessment   General Appearance Sleeping   Respiratory Pattern Normal   Resp Comments pt transitioned back to high flow for sustained desaturation on 15L o2 mid flow   Non-Invasive Information   O2 Interface Device (S)  HFNC prongs   Non-Invasive Ventilation Mode (S)  HFNC (High flow)   SpO2 (!) 87 %   $ Pulse Oximetry Spot Check Charge Completed   Non-Invasive Settings   FiO2 (%) 80  (HF)   Flow (lpm) 60  (HF)   Temperature (Set) 31   Non-Invasive Readings   Skin Intervention Skin intact   Heater Temperature (Obs)   (unit warming)

## 2024-06-06 NOTE — PLAN OF CARE
Problem: PAIN - ADULT  Goal: Verbalizes/displays adequate comfort level or baseline comfort level  Description: Interventions:  - Encourage patient to monitor pain and request assistance  - Assess pain using appropriate pain scale  - Administer analgesics based on type and severity of pain and evaluate response  - Implement non-pharmacological measures as appropriate and evaluate response  - Consider cultural and social influences on pain and pain management  - Notify physician/advanced practitioner if interventions unsuccessful or patient reports new pain  Outcome: Progressing     Problem: INFECTION - ADULT  Goal: Absence or prevention of progression during hospitalization  Description: INTERVENTIONS:  - Assess and monitor for signs and symptoms of infection  - Monitor lab/diagnostic results  - Monitor all insertion sites, i.e. indwelling lines, tubes, and drains  - Monitor endotracheal if appropriate and nasal secretions for changes in amount and color  - [place] appropriate cooling/warming therapies per order  - Administer medications as ordered  - Instruct and encourage patient and family to use good hand hygiene technique  - Identify and instruct in appropriate isolation precautions for identified infection/condition  Outcome: Progressing     Problem: SAFETY ADULT  Goal: Patient will remain free of falls  Description: INTERVENTIONS:  - Educate patient/family on patient safety including physical limitations  - Instruct patient to call for assistance with activity   - Consult OT/PT to assist with strengthening/mobility   - Keep Call bell within reach  - Keep bed low and locked with side rails adjusted as appropriate  - Keep care items and personal belongings within reach  - Initiate and maintain comfort rounds  - Make Fall Risk Sign visible to staff  - Offer Toileting every 2 Hours, in advance of need  - Initiate/Maintain bed alarm  - Apply yellow socks and bracelet for high fall risk patients  - Consider  moving patient to room near nurses station  Outcome: Progressing  Goal: Maintain or return to baseline ADL function  Description: INTERVENTIONS:  -  Assess patient's ability to carry out ADLs; assess patient's baseline for ADL function and identify physical deficits which impact ability to perform ADLs (bathing, care of mouth/teeth, toileting, grooming, dressing, etc.)  - Assess/evaluate cause of self-care deficits   - Assess range of motion  - Assess patient's mobility; develop plan if impaired  - Assess patient's need for assistive devices and provide as appropriate  - Encourage maximum independence but intervene and supervise when necessary  - Involve family in performance of ADLs  - Assess for home care needs following discharge   - Consider OT consult to assist with ADL evaluation and planning for discharge  - Provide patient education as appropriate  Outcome: Progressing  Goal: Maintains/Returns to pre admission functional level  Description: INTERVENTIONS:  - Perform AM-PAC 6 Click Basic Mobility/ Daily Activity assessment daily.  - Set and communicate daily mobility goal to care team and patient/family/caregiver.   - Collaborate with rehabilitation services on mobility goals if consulted  - Reposition patient every 2 hours.  - Dangle patient 1 times a day  - Stand patient 1 times a day  - Out of bed to chair 1 times a day   - Out of bed for meals 1 times a day  - Out of bed for toileting  - Record patient progress and toleration of activity level   Outcome: Progressing     Problem: DISCHARGE PLANNING  Goal: Discharge to home or other facility with appropriate resources  Description: INTERVENTIONS:  - Identify barriers to discharge w/patient and caregiver  - Arrange for needed discharge resources and transportation as appropriate  - Identify discharge learning needs (meds, wound care, etc.)  - Arrange for interpretive services to assist at discharge as needed  - Refer to Case Management Department for  coordinating discharge planning if the patient needs post-hospital services based on physician/advanced practitioner order or complex needs related to functional status, cognitive ability, or social support system  Outcome: Progressing     Problem: Knowledge Deficit  Goal: Patient/family/caregiver demonstrates understanding of disease process, treatment plan, medications, and discharge instructions  Description: Complete learning assessment and assess knowledge base.  Interventions:  - Provide teaching at level of understanding  - Provide teaching via preferred learning methods  Outcome: Progressing     Problem: Nutrition/Hydration-ADULT  Goal: Nutrient/Hydration intake appropriate for improving, restoring or maintaining nutritional needs  Description: Monitor and assess patient's nutrition/hydration status for malnutrition. Collaborate with interdisciplinary team and initiate plan and interventions as ordered.  Monitor patient's weight and dietary intake as ordered or per policy. Utilize nutrition screening tool and intervene as necessary. Determine patient's food preferences and provide high-protein, high-caloric foods as appropriate.     INTERVENTIONS:  - Monitor oral intake, urinary output, labs, and treatment plans  - Assess nutrition and hydration status and recommend course of action  - Evaluate amount of meals eaten  - Assist patient with eating if necessary   - Allow adequate time for meals  - Recommend/ encourage appropriate diets, oral nutritional supplements, and vitamin/mineral supplements  - Order, calculate, and assess calorie counts as needed  - Recommend, monitor, and adjust tube feedings and TPN/PPN based on assessed needs  - Assess need for intravenous fluids  - Provide specific nutrition/hydration education as appropriate  - Include patient/family/caregiver in decisions related to nutrition  Outcome: Progressing     Problem: Prexisting or High Potential for Compromised Skin Integrity  Goal: Skin  integrity is maintained or improved  Description: INTERVENTIONS:  - Identify patients at risk for skin breakdown  - Assess and monitor skin integrity  - Assess and monitor nutrition and hydration status  - Monitor labs   - Assess for incontinence   - Turn and reposition patient  - Assist with mobility/ambulation  - Relieve pressure over bony prominences  - Avoid friction and shearing  - Provide appropriate hygiene as needed including keeping skin clean and dry  - Evaluate need for skin moisturizer/barrier cream  - Collaborate with interdisciplinary team   - Patient/family teaching  - Consider wound care consult   Outcome: Progressing

## 2024-06-06 NOTE — PLAN OF CARE
NPO x critical meds crushed w/ puree  Aspiration precautions, oral care, suction set up  ST follow-up x2-4

## 2024-06-07 ENCOUNTER — APPOINTMENT (INPATIENT)
Dept: RADIOLOGY | Facility: HOSPITAL | Age: 59
DRG: 208 | End: 2024-06-07
Payer: MEDICARE

## 2024-06-07 PROBLEM — E87.20 LACTIC ACIDOSIS: Status: RESOLVED | Noted: 2024-06-03 | Resolved: 2024-06-07

## 2024-06-07 LAB
ANION GAP SERPL CALCULATED.3IONS-SCNC: 9 MMOL/L (ref 4–13)
BASOPHILS # BLD AUTO: 0.05 THOUSANDS/ÂΜL (ref 0–0.1)
BASOPHILS NFR BLD AUTO: 1 % (ref 0–1)
BUN SERPL-MCNC: 21 MG/DL (ref 5–25)
CA-I BLD-SCNC: 1.15 MMOL/L (ref 1.12–1.32)
CALCIUM SERPL-MCNC: 8.9 MG/DL (ref 8.4–10.2)
CHLORIDE SERPL-SCNC: 107 MMOL/L (ref 96–108)
CO2 SERPL-SCNC: 25 MMOL/L (ref 21–32)
CREAT SERPL-MCNC: 0.67 MG/DL (ref 0.6–1.3)
EOSINOPHIL # BLD AUTO: 0.05 THOUSAND/ÂΜL (ref 0–0.61)
EOSINOPHIL NFR BLD AUTO: 1 % (ref 0–6)
ERYTHROCYTE [DISTWIDTH] IN BLOOD BY AUTOMATED COUNT: 13.7 % (ref 11.6–15.1)
GFR SERPL CREATININE-BSD FRML MDRD: 106 ML/MIN/1.73SQ M
GLUCOSE SERPL-MCNC: 115 MG/DL (ref 65–140)
GLUCOSE SERPL-MCNC: 124 MG/DL (ref 65–140)
GLUCOSE SERPL-MCNC: 129 MG/DL (ref 65–140)
GLUCOSE SERPL-MCNC: 131 MG/DL (ref 65–140)
GLUCOSE SERPL-MCNC: 133 MG/DL (ref 65–140)
HCT VFR BLD AUTO: 32.4 % (ref 36.5–49.3)
HGB BLD-MCNC: 11.1 G/DL (ref 12–17)
IMM GRANULOCYTES # BLD AUTO: 0.1 THOUSAND/UL (ref 0–0.2)
IMM GRANULOCYTES NFR BLD AUTO: 1 % (ref 0–2)
LYMPHOCYTES # BLD AUTO: 1.27 THOUSANDS/ÂΜL (ref 0.6–4.47)
LYMPHOCYTES NFR BLD AUTO: 13 % (ref 14–44)
MAGNESIUM SERPL-MCNC: 1.7 MG/DL (ref 1.9–2.7)
MCH RBC QN AUTO: 32.7 PG (ref 26.8–34.3)
MCHC RBC AUTO-ENTMCNC: 34.3 G/DL (ref 31.4–37.4)
MCV RBC AUTO: 96 FL (ref 82–98)
MONOCYTES # BLD AUTO: 1.12 THOUSAND/ÂΜL (ref 0.17–1.22)
MONOCYTES NFR BLD AUTO: 12 % (ref 4–12)
NEUTROPHILS # BLD AUTO: 6.96 THOUSANDS/ÂΜL (ref 1.85–7.62)
NEUTS SEG NFR BLD AUTO: 72 % (ref 43–75)
NRBC BLD AUTO-RTO: 0 /100 WBCS
PHOSPHATE SERPL-MCNC: 2.5 MG/DL (ref 2.7–4.5)
PLATELET # BLD AUTO: 153 THOUSANDS/UL (ref 149–390)
PMV BLD AUTO: 11.4 FL (ref 8.9–12.7)
POTASSIUM SERPL-SCNC: 3.9 MMOL/L (ref 3.5–5.3)
PROCALCITONIN SERPL-MCNC: 1.16 NG/ML
RBC # BLD AUTO: 3.39 MILLION/UL (ref 3.88–5.62)
SODIUM SERPL-SCNC: 141 MMOL/L (ref 135–147)
WBC # BLD AUTO: 9.55 THOUSAND/UL (ref 4.31–10.16)

## 2024-06-07 PROCEDURE — 84145 PROCALCITONIN (PCT): CPT

## 2024-06-07 PROCEDURE — 85025 COMPLETE CBC W/AUTO DIFF WBC: CPT

## 2024-06-07 PROCEDURE — 71045 X-RAY EXAM CHEST 1 VIEW: CPT

## 2024-06-07 PROCEDURE — 82948 REAGENT STRIP/BLOOD GLUCOSE: CPT

## 2024-06-07 PROCEDURE — 80048 BASIC METABOLIC PNL TOTAL CA: CPT

## 2024-06-07 PROCEDURE — 99233 SBSQ HOSP IP/OBS HIGH 50: CPT | Performed by: ANESTHESIOLOGY

## 2024-06-07 PROCEDURE — 82330 ASSAY OF CALCIUM: CPT | Performed by: NURSE PRACTITIONER

## 2024-06-07 PROCEDURE — 92526 ORAL FUNCTION THERAPY: CPT

## 2024-06-07 PROCEDURE — 84100 ASSAY OF PHOSPHORUS: CPT

## 2024-06-07 PROCEDURE — 94760 N-INVAS EAR/PLS OXIMETRY 1: CPT

## 2024-06-07 PROCEDURE — 83735 ASSAY OF MAGNESIUM: CPT | Performed by: NURSE PRACTITIONER

## 2024-06-07 RX ORDER — MAGNESIUM SULFATE HEPTAHYDRATE 40 MG/ML
2 INJECTION, SOLUTION INTRAVENOUS ONCE
Status: COMPLETED | OUTPATIENT
Start: 2024-06-07 | End: 2024-06-07

## 2024-06-07 RX ORDER — FUROSEMIDE 10 MG/ML
20 INJECTION INTRAMUSCULAR; INTRAVENOUS ONCE
Status: COMPLETED | OUTPATIENT
Start: 2024-06-07 | End: 2024-06-07

## 2024-06-07 RX ORDER — METHOCARBAMOL 500 MG/1
500 TABLET, FILM COATED ORAL EVERY 8 HOURS SCHEDULED
Status: DISCONTINUED | OUTPATIENT
Start: 2024-06-07 | End: 2024-06-08

## 2024-06-07 RX ADMIN — METRONIDAZOLE 500 MG: 500 INJECTION, SOLUTION INTRAVENOUS at 15:40

## 2024-06-07 RX ADMIN — LIDOCAINE 5% 2 PATCH: 700 PATCH TOPICAL at 11:27

## 2024-06-07 RX ADMIN — Medication 20 MG: at 11:10

## 2024-06-07 RX ADMIN — ACETAMINOPHEN 1000 MG: 10 INJECTION INTRAVENOUS at 04:41

## 2024-06-07 RX ADMIN — POTASSIUM PHOSPHATE, MONOBASIC AND POTASSIUM PHOSPHATE, DIBASIC 12 MMOL: 224; 236 INJECTION, SOLUTION, CONCENTRATE INTRAVENOUS at 10:37

## 2024-06-07 RX ADMIN — LEVETIRACETAM 1500 MG: 500 TABLET, FILM COATED ORAL at 11:07

## 2024-06-07 RX ADMIN — METHOCARBAMOL TABLETS 500 MG: 500 TABLET, COATED ORAL at 21:42

## 2024-06-07 RX ADMIN — OXYCODONE HYDROCHLORIDE 10 MG: 5 SOLUTION ORAL at 20:41

## 2024-06-07 RX ADMIN — ONDANSETRON 4 MG: 2 INJECTION INTRAMUSCULAR; INTRAVENOUS at 10:35

## 2024-06-07 RX ADMIN — METHOCARBAMOL TABLETS 500 MG: 500 TABLET, COATED ORAL at 15:38

## 2024-06-07 RX ADMIN — LEVETIRACETAM 1500 MG: 500 TABLET, FILM COATED ORAL at 21:42

## 2024-06-07 RX ADMIN — FUROSEMIDE 20 MG: 10 INJECTION, SOLUTION INTRAMUSCULAR; INTRAVENOUS at 06:43

## 2024-06-07 RX ADMIN — ZONISAMIDE 300 MG: 100 CAPSULE ORAL at 21:43

## 2024-06-07 RX ADMIN — CHLORHEXIDINE GLUCONATE 0.12% ORAL RINSE 15 ML: 1.2 LIQUID ORAL at 11:03

## 2024-06-07 RX ADMIN — ACETAMINOPHEN 1000 MG: 10 INJECTION INTRAVENOUS at 19:58

## 2024-06-07 RX ADMIN — LORATADINE 10 MG: 10 TABLET ORAL at 11:09

## 2024-06-07 RX ADMIN — GABAPENTIN 400 MG: 400 CAPSULE ORAL at 15:39

## 2024-06-07 RX ADMIN — ONDANSETRON 4 MG: 2 INJECTION INTRAMUSCULAR; INTRAVENOUS at 16:35

## 2024-06-07 RX ADMIN — LEVOTHYROXINE SODIUM 150 MCG: 0.15 TABLET ORAL at 11:10

## 2024-06-07 RX ADMIN — ACETAMINOPHEN 1000 MG: 10 INJECTION INTRAVENOUS at 10:30

## 2024-06-07 RX ADMIN — SENNOSIDES AND DOCUSATE SODIUM 2 TABLET: 50; 8.6 TABLET ORAL at 11:10

## 2024-06-07 RX ADMIN — MAGNESIUM SULFATE HEPTAHYDRATE 2 G: 40 INJECTION, SOLUTION INTRAVENOUS at 06:43

## 2024-06-07 RX ADMIN — METRONIDAZOLE 500 MG: 500 INJECTION, SOLUTION INTRAVENOUS at 10:26

## 2024-06-07 RX ADMIN — Medication 2000 MG: at 04:44

## 2024-06-07 RX ADMIN — PRAVASTATIN SODIUM 80 MG: 80 TABLET ORAL at 15:38

## 2024-06-07 RX ADMIN — CHLORHEXIDINE GLUCONATE 0.12% ORAL RINSE 15 ML: 1.2 LIQUID ORAL at 21:42

## 2024-06-07 RX ADMIN — LACOSAMIDE 200 MG: 100 TABLET, FILM COATED ORAL at 21:42

## 2024-06-07 RX ADMIN — LACOSAMIDE 150 MG: 100 TABLET, FILM COATED ORAL at 11:07

## 2024-06-07 RX ADMIN — METRONIDAZOLE 500 MG: 500 INJECTION, SOLUTION INTRAVENOUS at 01:13

## 2024-06-07 RX ADMIN — GABAPENTIN 400 MG: 400 CAPSULE ORAL at 21:42

## 2024-06-07 RX ADMIN — ENOXAPARIN SODIUM 40 MG: 40 INJECTION SUBCUTANEOUS at 11:10

## 2024-06-07 RX ADMIN — GABAPENTIN 400 MG: 400 CAPSULE ORAL at 11:10

## 2024-06-07 RX ADMIN — Medication 2000 MG: at 16:24

## 2024-06-07 NOTE — NUTRITION
06/07/24 1043   Recommendations/Interventions   Summary Patient remains NPO secondary to aspiration risk and respiratory distess. Patient self extubated 6/4 and remains on MFNC. Chest tube in place. May need to consider initiating EN if unable to safely advance diet.   Interventions/Recommendations Other (Specify)   Recommendations to Provider Suggest advance diet texture per SLP recommendation. If unable to safely advance diet and EN desired consider initiating 22 hr cyclic EN secondary to holding EN for one hour before and after Omeprazole administration. Recommend: Vital AF 1.2 kcal @ 20 ml/hr and advance to a goal rate of 70 ml/hr (1848 kcal, 115 gms pro, 1247 ml tv). Free water flush per provider. Monitor electrolytes, phosphorus; replete accordingly.

## 2024-06-07 NOTE — PLAN OF CARE
Problem: PAIN - ADULT  Goal: Verbalizes/displays adequate comfort level or baseline comfort level  Description: Interventions:  - Encourage patient to monitor pain and request assistance  - Assess pain using appropriate pain scale  - Administer analgesics based on type and severity of pain and evaluate response  - Implement non-pharmacological measures as appropriate and evaluate response  - Consider cultural and social influences on pain and pain management  - Notify physician/advanced practitioner if interventions unsuccessful or patient reports new pain  Outcome: Progressing     Problem: INFECTION - ADULT  Goal: Absence or prevention of progression during hospitalization  Description: INTERVENTIONS:  - Assess and monitor for signs and symptoms of infection  - Monitor lab/diagnostic results  - Monitor all insertion sites, i.e. indwelling lines, tubes, and drains  - Monitor endotracheal if appropriate and nasal secretions for changes in amount and color  - [place] appropriate cooling/warming therapies per order  - Administer medications as ordered  - Instruct and encourage patient and family to use good hand hygiene technique  - Identify and instruct in appropriate isolation precautions for identified infection/condition  Outcome: Progressing     Problem: SAFETY ADULT  Goal: Patient will remain free of falls  Description: INTERVENTIONS:  - Educate patient/family on patient safety including physical limitations  - Instruct patient to call for assistance with activity   - Consult OT/PT to assist with strengthening/mobility   - Keep Call bell within reach  - Keep bed low and locked with side rails adjusted as appropriate  - Keep care items and personal belongings within reach  - Initiate and maintain comfort rounds  - Make Fall Risk Sign visible to staff  - Offer Toileting every 2 Hours, in advance of need  - Initiate/Maintain bed alarm  - Apply yellow socks and bracelet for high fall risk patients  - Consider  moving patient to room near nurses station  Outcome: Progressing  Goal: Maintain or return to baseline ADL function  Description: INTERVENTIONS:  -  Assess patient's ability to carry out ADLs; assess patient's baseline for ADL function and identify physical deficits which impact ability to perform ADLs (bathing, care of mouth/teeth, toileting, grooming, dressing, etc.)  - Assess/evaluate cause of self-care deficits   - Assess range of motion  - Assess patient's mobility; develop plan if impaired  - Assess patient's need for assistive devices and provide as appropriate  - Encourage maximum independence but intervene and supervise when necessary  - Involve family in performance of ADLs  - Assess for home care needs following discharge   - Consider OT consult to assist with ADL evaluation and planning for discharge  - Provide patient education as appropriate  Outcome: Progressing  Goal: Maintains/Returns to pre admission functional level  Description: INTERVENTIONS:  - Perform AM-PAC 6 Click Basic Mobility/ Daily Activity assessment daily.  - Set and communicate daily mobility goal to care team and patient/family/caregiver.   - Collaborate with rehabilitation services on mobility goals if consulted  - Reposition patient every 2 hours.  - Dangle patient 1 times a day  - Stand patient 1 times a day  - Out of bed to chair 1 times a day   - Out of bed for meals 1 times a day  - Out of bed for toileting  - Record patient progress and toleration of activity level   Outcome: Progressing     Problem: DISCHARGE PLANNING  Goal: Discharge to home or other facility with appropriate resources  Description: INTERVENTIONS:  - Identify barriers to discharge w/patient and caregiver  - Arrange for needed discharge resources and transportation as appropriate  - Identify discharge learning needs (meds, wound care, etc.)  - Arrange for interpretive services to assist at discharge as needed  - Refer to Case Management Department for  coordinating discharge planning if the patient needs post-hospital services based on physician/advanced practitioner order or complex needs related to functional status, cognitive ability, or social support system  Outcome: Progressing     Problem: Knowledge Deficit  Goal: Patient/family/caregiver demonstrates understanding of disease process, treatment plan, medications, and discharge instructions  Description: Complete learning assessment and assess knowledge base.  Interventions:  - Provide teaching at level of understanding  - Provide teaching via preferred learning methods  Outcome: Progressing     Problem: Nutrition/Hydration-ADULT  Goal: Nutrient/Hydration intake appropriate for improving, restoring or maintaining nutritional needs  Description: Monitor and assess patient's nutrition/hydration status for malnutrition. Collaborate with interdisciplinary team and initiate plan and interventions as ordered.  Monitor patient's weight and dietary intake as ordered or per policy. Utilize nutrition screening tool and intervene as necessary. Determine patient's food preferences and provide high-protein, high-caloric foods as appropriate.     INTERVENTIONS:  - Monitor oral intake, urinary output, labs, and treatment plans  - Assess nutrition and hydration status and recommend course of action  - Evaluate amount of meals eaten  - Assist patient with eating if necessary   - Allow adequate time for meals  - Recommend/ encourage appropriate diets, oral nutritional supplements, and vitamin/mineral supplements  - Order, calculate, and assess calorie counts as needed  - Recommend, monitor, and adjust tube feedings and TPN/PPN based on assessed needs  - Assess need for intravenous fluids  - Provide specific nutrition/hydration education as appropriate  - Include patient/family/caregiver in decisions related to nutrition  Outcome: Progressing     Problem: Prexisting or High Potential for Compromised Skin Integrity  Goal: Skin  integrity is maintained or improved  Description: INTERVENTIONS:  - Identify patients at risk for skin breakdown  - Assess and monitor skin integrity  - Assess and monitor nutrition and hydration status  - Monitor labs   - Assess for incontinence   - Turn and reposition patient  - Assist with mobility/ambulation  - Relieve pressure over bony prominences  - Avoid friction and shearing  - Provide appropriate hygiene as needed including keeping skin clean and dry  - Evaluate need for skin moisturizer/barrier cream  - Collaborate with interdisciplinary team   - Patient/family teaching  - Consider wound care consult   Outcome: Progressing

## 2024-06-07 NOTE — CASE MANAGEMENT
Case Management Discharge Planning Note    Patient name Jairon Oconnell  Location ICU 10/ICU 10 MRN [id number]  : 1965 Date 2024       Current Admission Date: 6/3/2024  Current Admission Diagnosis:Cardiac arrest (Formerly [facility])   Patient Active Problem List    Diagnosis Date Noted Date Diagnosed    Lactic acidosis 2024     Acute metabolic encephalopathy 2024     Cardiac arrest (HCC) 2024     Dysphagia 2024     Closed traumatic fracture of ribs of right side with pneumothorax 2024     Acute respiratory failure with hypoxia and hypercapnia (Formerly [facility]) 2024     Urinary retention 2024     Nondisplaced fracture of greater trochanter of right femur, subsequent encounter for closed fracture with routine healing 2024     Closed nondisplaced fracture of proximal phalanx of left index finger with routine healing, subsequent encounter 10/30/2023     Pressure ulcer of sacral region, stage 3 ([facility]ly [facility]) 2023     Ambulatory dysfunction 2022     Social problem 2021     Hypomagnesemia 2021     Thrombocytopathia ([facility]ly [facility]) 2020     Hyponatremia 2019     Metabolic encephalopathy 2019     Seborrheic dermatitis of scalp 2019     Nearsightedness 2019     Behavior concern in adult 2019     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Formerly [facility]) 2019     Vitamin B12 deficiency 2017     Mixed hyperlipidemia 2016     Vitamin D deficiency 2016     Type 2 diabetes mellitus with diabetic neuropathy, without long-term current use of insulin ([facility]ly [facility]) 06/15/2016     Acquired hypothyroidism 06/15/2016     Cataract 2013     Ataxic cerebral palsy (HCC) 2013     Generalized convulsive epilepsy ([facility]) 2013     Essential hypertension 2013     Osteoporosis 2013     Scoliosis 2013       LOS (days): 4  Geometric Mean LOS (GMLOS) (days): 5.2  Days to GMLOS:1.4     OBJECTIVE:  Risk of Unplanned Readmission Score: 25.9          Current admission status: Inpatient   Preferred Pharmacy:   [facility] - [place] - [address]  [address] [place] [address]  Phone: [phone] Fax: [phone]    [id number] - [place] - [address]  [facility] [place] [address]  Phone: [phone] Fax: [phone]    Primary Care Provider: HUNTER Brown    Primary Insurance: MEDICARE  Secondary Insurance: [place] MEDICAL [employer]ISTANCE    DISCHARGE DETAILS:                                          Other Referral/Resources/Interventions Provided:  Referral Comments: S/W pt's caregiver, Betty Huffman ([phone]) via T/C.  Discussed PT/OT recommendation for STR/SNF vs HH.  Caregiver feels pt does not do well in facilities and prefers HH.  Reviewed choice list and left copy at pt's bedside;  will call caregiver tomorrow for preference.  Pt does not live in a group home;  Betty reports pt is in a family setting, living w her and her spouse.  Betty works;  her  is at home and primary caregiver to pt.  Couple will p/u pt at time of d/c.         Treatment Team Recommendation: SNF, Short Term Rehab, Home with Home Health Care  Discharge Destination Plan:: Home with Home Health Care  Transport at Discharge : Family                             IMM Given (Date):: 06/07/24  IMM Given to:: Family  Family notified:: caregiver Betty

## 2024-06-07 NOTE — SPEECH THERAPY NOTE
Speech Language/Pathology     Speech/Language Pathology Progress Note     Patient Name: Jairon Oconnell    Today's Date: 6/7/2024     Problem List  Principal Problem:    Cardiac arrest (HCC)  Active Problems:    Generalized convulsive epilepsy (HCC)    Acquired hypothyroidism    Behavior concern in adult    Ambulatory dysfunction    Lactic acidosis    Acute metabolic encephalopathy    Dysphagia    Closed traumatic fracture of ribs of right side with pneumothorax    Acute respiratory failure with hypoxia and hypercapnia (HCC)        Recommendations:   Diet: puree/honey; liquids by teaspoon only    Meds: crushed with puree - critical meds. Non oral if possible  Feeding Assistance: 1:1 full feed  Frequent Oral care: 2-4x/day  Aspiration precautions and compensatory swallowing strategies: upright posture, PO meds only when fully alert, SLOW rate of intake; PO in 1/2 tsp quantities, monitor respiratory status --- HOLD if RR>35, drop in saturation, s/s of aspiration   Other Recommendations/ considerations: ST will continue to follow closely for advancement to oral diet and modify/adjust as indicated x2-4     Subjective:  Patient received awake, upright in bed.  Now back on MFNC 15L    Previous/current diet: NPO x meds     Objective:  The following consistencies were tested: 4oz puree solids; 4oz honey thick liquids in teaspoon quantity.  Transitioned to cup sips which pt demonstrates progressively wet vocal quality.  Tolerates modified textures w/ no overt s/s of aspiration, O2 saturation remains mid 90's.  No increased work of breath or audible congestion noted otherwise.    Note, trials included modified textures only given findings on MBS.    Assessment:  Oropharyngeal swallow function appears improved; pt able to take modified textures in small quantities while maintain seemingly patent airway, no overt s/s of aspiration per bedside observations.      Plan:  ST follow-up x2-4; preferably during full meal to ensure  mgmt of larger quantities  D/w RN, MD, HUNTER.       Cheri Green MS, CCC-SLP  Speech-Language Pathologist  [place] #[id number]  [place] #[id number]

## 2024-06-07 NOTE — OCCUPATIONAL THERAPY NOTE
Occupational Therapy Cancellation Note        Patient Name: Jairon Oconnell  Today's Date: 6/7/2024 06/07/24 1040   OT Last Visit   OT Visit Date 06/07/24   Note Type   Note type Cancelled Session   Cancel Reasons Refusal   Additional Comments Attempted to see pt for ongoing OT treatment this date. Pt adamently refused working with [employer] at this time. Provided education regarding benefits of mobility and being OOB, however pt became agitated. RN Andrew present and aware. OT will follow up at a later time.         LUKE Worthington/L

## 2024-06-07 NOTE — ASSESSMENT & PLAN NOTE
S/p self extubation 6/4  With residual AHRF in setting of rib fractures, likely aspiration  Weaned from high flow to midflow nasal cannula, required return to high flow   Wean as able to maintain Spo2 >92%  Aggressive pain control  Diuresis overnight with good response, consider redosing today

## 2024-06-07 NOTE — RESPIRATORY THERAPY NOTE
06/07/24 2166   Respiratory Assessment   Resp Comments patient taken off of the optiflow and transitioned to 15LMF.   O2 Device MFNC   Non-Invasive Information   O2 Interface Device MFNC prongs   Non-Invasive Ventilation Mode MFNC (Mid flow)   $ Pulse Oximetry Spot Check Charge Completed   Non-Invasive Settings   Flow (lpm) 15

## 2024-06-07 NOTE — PLAN OF CARE
Problem: PAIN - ADULT  Goal: Verbalizes/displays adequate comfort level or baseline comfort level  Description: Interventions:  - Encourage patient to monitor pain and request assistance  - Assess pain using appropriate pain scale  - Administer analgesics based on type and severity of pain and evaluate response  - Implement non-pharmacological measures as appropriate and evaluate response  - Consider cultural and social influences on pain and pain management  - Notify physician/advanced practitioner if interventions unsuccessful or patient reports new pain  6/7/2024 1921 by Morgan Hernández RN  Outcome: Progressing  6/7/2024 1921 by Morgan Hernández RN  Outcome: Progressing     Problem: INFECTION - ADULT  Goal: Absence or prevention of progression during hospitalization  Description: INTERVENTIONS:  - Assess and monitor for signs and symptoms of infection  - Monitor lab/diagnostic results  - Monitor all insertion sites, i.e. indwelling lines, tubes, and drains  - Monitor endotracheal if appropriate and nasal secretions for changes in amount and color  - [place] appropriate cooling/warming therapies per order  - Administer medications as ordered  - Instruct and encourage patient and family to use good hand hygiene technique  - Identify and instruct in appropriate isolation precautions for identified infection/condition  6/7/2024 1921 by Morgan Hernández RN  Outcome: Progressing  6/7/2024 1921 by Morgan Hernández RN  Outcome: Progressing     Problem: SAFETY ADULT  Goal: Patient will remain free of falls  Description: INTERVENTIONS:  - Educate patient/family on patient safety including physical limitations  - Instruct patient to call for assistance with activity   - Consult OT/PT to assist with strengthening/mobility   - Keep Call bell within reach  - Keep bed low and locked with side rails adjusted as appropriate  - Keep care items and personal belongings within reach  - Initiate and maintain comfort rounds  - Make Fall Risk Sign  visible to staff  - Offer Toileting every 2 Hours, in advance of need  - Initiate/Maintain bed alarm  - Apply yellow socks and bracelet for high fall risk patients  - Consider moving patient to room near nurses station  6/7/2024 1921 by Morgan Hernández RN  Outcome: Progressing  6/7/2024 1921 by Morgan Hernández RN  Outcome: Progressing  Goal: Maintain or return to baseline ADL function  Description: INTERVENTIONS:  -  Assess patient's ability to carry out ADLs; assess patient's baseline for ADL function and identify physical deficits which impact ability to perform ADLs (bathing, care of mouth/teeth, toileting, grooming, dressing, etc.)  - Assess/evaluate cause of self-care deficits   - Assess range of motion  - Assess patient's mobility; develop plan if impaired  - Assess patient's need for assistive devices and provide as appropriate  - Encourage maximum independence but intervene and supervise when necessary  - Involve family in performance of ADLs  - Assess for home care needs following discharge   - Consider OT consult to assist with ADL evaluation and planning for discharge  - Provide patient education as appropriate  6/7/2024 1921 by Morgan Hernández RN  Outcome: Progressing  6/7/2024 1921 by Morgan Hernández RN  Outcome: Progressing  Goal: Maintains/Returns to pre admission functional level  Description: INTERVENTIONS:  - Perform AM-PAC 6 Click Basic Mobility/ Daily Activity assessment daily.  - Set and communicate daily mobility goal to care team and patient/family/caregiver.   - Collaborate with rehabilitation services on mobility goals if consulted  - Reposition patient every 2 hours.  - Dangle patient 1 times a day  - Stand patient 1 times a day  - Out of bed to chair 1 times a day   - Out of bed for meals 1 times a day  - Out of bed for toileting  - Record patient progress and toleration of activity level   6/7/2024 1921 by Morgan Hernández RN  Outcome: Progressing  6/7/2024 1921 by Morgan Hernández RN  Outcome: Progressing      Problem: DISCHARGE PLANNING  Goal: Discharge to home or other facility with appropriate resources  Description: INTERVENTIONS:  - Identify barriers to discharge w/patient and caregiver  - Arrange for needed discharge resources and transportation as appropriate  - Identify discharge learning needs (meds, wound care, etc.)  - Arrange for interpretive services to assist at discharge as needed  - Refer to Case Management Department for coordinating discharge planning if the patient needs post-hospital services based on physician/advanced practitioner order or complex needs related to functional status, cognitive ability, or social support system  6/7/2024 1921 by Morgan Hernández RN  Outcome: Progressing  6/7/2024 1921 by Morgan Hernández RN  Outcome: Progressing     Problem: Knowledge Deficit  Goal: Patient/family/caregiver demonstrates understanding of disease process, treatment plan, medications, and discharge instructions  Description: Complete learning assessment and assess knowledge base.  Interventions:  - Provide teaching at level of understanding  - Provide teaching via preferred learning methods  6/7/2024 1921 by Morgan Hernández RN  Outcome: Progressing  6/7/2024 1921 by Morgan Hernández RN  Outcome: Progressing     Problem: Nutrition/Hydration-ADULT  Goal: Nutrient/Hydration intake appropriate for improving, restoring or maintaining nutritional needs  Description: Monitor and assess patient's nutrition/hydration status for malnutrition. Collaborate with interdisciplinary team and initiate plan and interventions as ordered.  Monitor patient's weight and dietary intake as ordered or per policy. Utilize nutrition screening tool and intervene as necessary. Determine patient's food preferences and provide high-protein, high-caloric foods as appropriate.     INTERVENTIONS:  - Monitor oral intake, urinary output, labs, and treatment plans  - Assess nutrition and hydration status and recommend course of action  - Evaluate amount of  meals eaten  - Assist patient with eating if necessary   - Allow adequate time for meals  - Recommend/ encourage appropriate diets, oral nutritional supplements, and vitamin/mineral supplements  - Order, calculate, and assess calorie counts as needed  - Recommend, monitor, and adjust tube feedings and TPN/PPN based on assessed needs  - Assess need for intravenous fluids  - Provide specific nutrition/hydration education as appropriate  - Include patient/family/caregiver in decisions related to nutrition  6/7/2024 1921 by Morgan Hernández RN  Outcome: Progressing  6/7/2024 1921 by Morgan Hernández RN  Outcome: Progressing     Problem: Prexisting or High Potential for Compromised Skin Integrity  Goal: Skin integrity is maintained or improved  Description: INTERVENTIONS:  - Identify patients at risk for skin breakdown  - Assess and monitor skin integrity  - Assess and monitor nutrition and hydration status  - Monitor labs   - Assess for incontinence   - Turn and reposition patient  - Assist with mobility/ambulation  - Relieve pressure over bony prominences  - Avoid friction and shearing  - Provide appropriate hygiene as needed including keeping skin clean and dry  - Evaluate need for skin moisturizer/barrier cream  - Collaborate with interdisciplinary team   - Patient/family teaching  - Consider wound care consult   6/7/2024 1921 by Morgan Hernández RN  Outcome: Progressing  6/7/2024 1921 by Morgan Hernández RN  Outcome: Progressing

## 2024-06-07 NOTE — PHYSICAL THERAPY NOTE
Physical Therapy Cancellation Note    Chart review performed. At this time, PT treatment session cancelled due to patient adamantly refusing participation in session. Patient educated on importance of out of bed mobility and encouraged to participate with assistance; however, pt refused and became agitated and cursing at staff. RN Andrew present and aware; PT will follow and provide PT interventions as appropriate.       06/07/24 1031   PT Last Visit   PT Visit Date 06/07/24   Note Type   Note Type Cancelled Session   Cancel Reasons Refusal         Harriet Noel, PT

## 2024-06-07 NOTE — SPEECH THERAPY NOTE
Speech Language/Pathology     Speech/Language Pathology Progress Note     Patient Name: Jairon Oconnell    Today's Date: 6/7/2024     Problem List  Principal Problem:    Cardiac arrest (HCC)  Active Problems:    Generalized convulsive epilepsy (HCC)    Acquired hypothyroidism    Behavior concern in adult    Ambulatory dysfunction    Lactic acidosis    Acute metabolic encephalopathy    Dysphagia    Closed traumatic fracture of ribs of right side with pneumothorax    Acute respiratory failure with hypoxia and hypercapnia (HCC)    Subjective:  Patient received awake, alert upright in bed.  Lunch tray ordered by this writer     Previous/current diet: puree/honey by tsp     Objective:  The following consistencies were tested puree solids, tsp honey thick liquid in small quantities.  Functional bolus retrieval, transfer brisk - suspect this is 2/2 limited oral control.  Pacing controlled by feeder/clinician.  No overt s/s of aspiration; O2 saturation remains 98% on 6L NC.  Vocal quality appears dry. Noted impulsivity w/o clinician assist.      Assessment:  Oropharyngeal swallow function appears to be improving; managed well w/ lunch today.  Continue modified diet .  ST ongoing evaluation for diet advancement as pt status permits       Plan:  Puree/honey  Meds crushed  1:1 full feeding assist  Alternate solids and liquids  ST follow-up x1-3      Cheri Green MS, CCC-SLP  Speech-Language Pathologist  [place] #[id number]  [place] #[id number]

## 2024-06-07 NOTE — PLAN OF CARE
Diet: puree/honey; liquids by teaspoon only    Meds: crushed with puree - critical meds. Non oral if possible  Feeding Assistance: 1:1 full feed  Frequent Oral care: 2-4x/day  Aspiration precautions and compensatory swallowing strategies: upright posture, PO meds only when fully alert, SLOW rate of intake; PO in 1/2 tsp quantities, monitor respiratory status --- HOLD if RR>35, drop in saturation, s/s of aspiration   Other Recommendations/ considerations: ST will continue to follow closely for advancement to oral diet and modify/adjust as indicated x2-4

## 2024-06-07 NOTE — PROGRESS NOTES
"[facility]  Progress Note  Name: Jairon Oconnell I  MRN: [id number]  Unit/Bed#: ICU 10 I Date of Admission: 6/3/2024   Date of Service: 6/7/2024 I Hospital Day: 4    Assessment & Plan   * Cardiac arrest (HCC)  Assessment & Plan  Thought to be secondary to hypoxia secondary to airway obstruction in the setting of chocking  Now post self extubation, purposeful movement  WIth residual AHRF, 2/2 rib fractures vs aspiration as below  ECHO- EF 55%  Lactic acidosis, cardiac enzymes all decreasing  Currently HD stable aside from hypoxia    Acute respiratory failure with hypoxia and hypercapnia (HCC)  Assessment & Plan  S/p self extubation 6/4  With residual AHRF in setting of rib fractures, likely aspiration  Weaned from high flow to midflow nasal cannula, required return to high flow   Wean as able to maintain Spo2 >92%  Aggressive pain control  Diuresis overnight with good response, consider redosing today     Closed traumatic fracture of ribs of right side with pneumothorax  Assessment & Plan  S/p 10 Fr right chest tube  Remains on water seal   Aggressive, multi-modal pain control    Dysphagia  Assessment & Plan  Speech recommending NPO     Acute metabolic encephalopathy  Assessment & Plan  Now s/p self extubation  Able to nod appropriately to question, but majority of speech is incomprehensible  Serial neuro checks  CAM ICU  Delerium precautions    Lactic acidosis  Assessment & Plan  Generous volume resuscitation  Improved    Ambulatory dysfunction  Assessment & Plan  PT/OT when appropriate  Fall precautions    Behavior concern in adult  Assessment & Plan  Holding home serouqel  Consider restarting when O2 requirements improved     Acquired hypothyroidism  Assessment & Plan  Continue home levothyroxine  Check TSH with reflex T4    Generalized convulsive epilepsy (HCC)  Assessment & Plan  Continue home medications  Holding home Depakote as patient reportedly requires \"brand name\" Depakote       "       Disposition: Stepdown Level 1    ICU Core Measures     A: Assess, Prevent, and Manage Pain Has pain been assessed? Yes  Need for changes to pain regimen? No   B: Both SAT/SAT  N/A   C: Choice of Sedation RASS Goal: 0 Alert and Calm  Need for changes to sedation or analgesia regimen? No   D: Delirium CAM-ICU: Unable to perform secondary to Dementia or other chronic cognitive dysfunction   E: Early Mobility  Plan for early mobility? Yes   F: Family Engagement Plan for family engagement today? Yes       Antibiotic Review: Continue broad spectrum secondary to severity of illness.     Review of Invasive Devices:            Prophylaxis:  VTE VTE covered by:  enoxaparin, Subcutaneous, 40 mg at 06/06/24 0907       Stress Ulcer  covered byomeprazole (PRILOSEC) suspension 2 mg/mL [[id number]]         Significant 24hr Events     24hr events: returned to high flow.  CT remains to waterseal.  20 mg IV lasix given overnight with good response.  No acute events      Subjective   Review of Systems: See HPI for Review of Systems     Objective                            Vitals I/O      Most Recent Min/Max in 24hrs   Temp (!) 97.4 °F (36.3 °C) Temp  Min: 97.1 °F (36.2 °C)  Max: 99.8 °F (37.7 °C)   Pulse 91 Pulse  Min: 86  Max: 91   Resp 22 Resp  Min: 22  Max: 24   /82 BP  Min: 144/71  Max: 161/81   O2 Sat (!) 89 % SpO2  Min: 87 %  Max: 100 %      Intake/Output Summary (Last 24 hours) at 6/7/2024 0200  Last data filed at 6/6/2024 2000  Gross per 24 hour   Intake 3150 ml   Output 1844 ml   Net 1306 ml       Diet NPO; Sips with meds    Invasive Monitoring           Physical Exam   Physical Exam  Eyes:      General: Lids are normal.      Extraocular Movements: Extraocular movements intact.      Conjunctiva/sclera: Conjunctivae normal.   Skin:     General: Skin is warm and dry.   HENT:      Head: Normocephalic and atraumatic.   Neck:      Trachea: Trachea normal.   Cardiovascular:      Rate and Rhythm: Normal rate.      Pulses:  Normal pulses.   Musculoskeletal:      Cervical back: Normal range of motion.      Right lower leg: No edema.      Left lower leg: No edema.   Abdominal:      Palpations: Abdomen is soft.   Constitutional:       General: He is awake.      Appearance: He is overweight. He is ill-appearing.      Comments: High flow nasal cannula    Pulmonary:      Breath sounds: Rhonchi present.   Chest:       Neurological:      General: No focal deficit present.      Mental Status: He is alert.      GCS: GCS eye subscore is 4. GCS verbal subscore is 4. GCS motor subscore is 6.      Sensory: Sensation is intact.            Diagnostic Studies      EKG: NSR   Imaging:   XR chest portable ICU   Final Result      Worsened pulmonary edema. New dense consolidation in the right midlung, possibly aspiration.         Workstation performed: [id number]         FL barium swallow video w speech   Final Result      XR chest portable ICU   Final Result   No pneumothorax visualized.   Tubes and lines, as above. Consider retraction of the endotracheal tube by 1.6 cm.   Perihilar patchy airspace opacities. Consolidations in the posterior medial lower lobes.            Workstation performed: [id number]         CT head without contrast   Final Result      No acute intracranial abnormality.                  Workstation performed: [id number]         CT chest with contrast   Final Result      Bilateral lower lobe airspace consolidations as well as tree-in-bud and groundglass opacities most conspicuous at the right upper lobe compatible with bronchopneumonia      Numerous bilateral anterior rib fractures.               Workstation performed: [id number]         XR chest 1 view portable   ED Interpretation   Reexpansion of R lung, adequate positioning of R chest tube.       Final Result      Since Ramona 3, 2000 2420:42:   1.  New right chest tube with decrease size of the right pneumothorax. Assessment of the pneumothorax is limited due to and overlying pacer  pad, though on the subsequent CT chest, the pneumothorax is trace.   2.  Right lower lobe atelectasis.   3.  Persistent slight high positioning of enteric tube with sidehole at the expected position of the diaphragmatic hiatus. Advancement by approximately 4 cm is advised.         I personally discussed this study with Dr. Carias on 6/4/2024 10:56 AM.            Workstation performed: [id number]         XR chest 1 view portable   ED Interpretation   Large R PTX  ETT at demetrius  OGT subdiaphragmatic.       Final Result      Large right pneumothorax.      Endotracheal tube at the level of the demetrius, recommend retraction.      Enteric tube with tip at the level of the gastroesophageal junction, sideport in the lower esophagus.   Recommend advancement.      Multiple right anterior minimally displaced rib fractures.      Findings concur with the preliminary report by the referring clinician already in PACS and/or our electronic record EPIC.               Workstation performed: [id number]               Medications:  Scheduled PRN   acetaminophen, 1,000 mg, Q8H  cefepime, 2,000 mg, Q12H  chlorhexidine, 15 mL, Q12H RONALD  enoxaparin, 40 mg, Daily  gabapentin, 400 mg, TID  insulin lispro, 1-6 Units, Q6H RONALD  lacosamide, 150 mg, QAM  lacosamide, 200 mg, HS  levETIRAcetam, 1,500 mg, Q12H RONALD  levothyroxine, 150 mcg, Daily  lidocaine, 2 patch, Daily  loratadine, 10 mg, Daily  metroNIDAZOLE, 500 mg, Q8H  omeprazole (PRILOSEC) suspension 2 mg/mL, 20 mg, Daily  pravastatin, 80 mg, Daily With Dinner  senna-docusate sodium, 2 tablet, BID  zonisamide, 300 mg, HS      ondansetron, 4 mg, Q6H PRN  oxyCODONE, 5 mg, Q6H PRN   Or  oxyCODONE, 10 mg, Q6H PRN       Continuous    multi-electrolyte, 125 mL/hr, Last Rate: 125 mL/hr (06/06/24 1324)         Labs:    CBC    Recent Labs     06/05/24 0520 06/06/24  0534   WBC 8.23 8.40   HGB 10.8* 11.0*   HCT 31.9* 32.8*   * 117*   BANDSPCT  --  18*     BMP    Recent Labs     06/05/24  0520  06/06/24  0449   SODIUM 141 141   K 3.7 3.9    109*   CO2 26 25   AGAP 7 7   BUN 33* 31*   CREATININE 0.86 0.75   CALCIUM 8.3* 8.6       Coags    No recent results     Additional Electrolytes  Recent Labs     06/05/24  0520 06/06/24 0449 06/06/24  0534   MG  --  2.1  --    PHOS 2.9 2.1*  --    CAIONIZED 1.11*  --  1.13          Blood Gas    No recent results  No recent results LFTs  No recent results    Infectious  Recent Labs     06/05/24  0520 06/06/24  0449   PROCALCITONI 2.28* 1.86*     Glucose  Recent Labs     06/05/24  0520 06/06/24 0449   GLUC 130 127               HUNTER Colby

## 2024-06-08 ENCOUNTER — APPOINTMENT (INPATIENT)
Dept: RADIOLOGY | Facility: HOSPITAL | Age: 59
DRG: 208 | End: 2024-06-08
Payer: MEDICARE

## 2024-06-08 LAB
ANION GAP SERPL CALCULATED.3IONS-SCNC: 6 MMOL/L (ref 4–13)
BASOPHILS # BLD AUTO: 0.08 THOUSANDS/ÂΜL (ref 0–0.1)
BASOPHILS NFR BLD AUTO: 1 % (ref 0–1)
BUN SERPL-MCNC: 24 MG/DL (ref 5–25)
CALCIUM SERPL-MCNC: 8.6 MG/DL (ref 8.4–10.2)
CHLORIDE SERPL-SCNC: 107 MMOL/L (ref 96–108)
CO2 SERPL-SCNC: 27 MMOL/L (ref 21–32)
CREAT SERPL-MCNC: 0.58 MG/DL (ref 0.6–1.3)
EOSINOPHIL # BLD AUTO: 0.21 THOUSAND/ÂΜL (ref 0–0.61)
EOSINOPHIL NFR BLD AUTO: 2 % (ref 0–6)
ERYTHROCYTE [DISTWIDTH] IN BLOOD BY AUTOMATED COUNT: 13.8 % (ref 11.6–15.1)
GFR SERPL CREATININE-BSD FRML MDRD: 113 ML/MIN/1.73SQ M
GLUCOSE SERPL-MCNC: 104 MG/DL (ref 65–140)
GLUCOSE SERPL-MCNC: 127 MG/DL (ref 65–140)
GLUCOSE SERPL-MCNC: 128 MG/DL (ref 65–140)
GLUCOSE SERPL-MCNC: 134 MG/DL (ref 65–140)
GLUCOSE SERPL-MCNC: 160 MG/DL (ref 65–140)
HCT VFR BLD AUTO: 32 % (ref 36.5–49.3)
HGB BLD-MCNC: 10.8 G/DL (ref 12–17)
IMM GRANULOCYTES # BLD AUTO: 0.25 THOUSAND/UL (ref 0–0.2)
IMM GRANULOCYTES NFR BLD AUTO: 3 % (ref 0–2)
LYMPHOCYTES # BLD AUTO: 2.01 THOUSANDS/ÂΜL (ref 0.6–4.47)
LYMPHOCYTES NFR BLD AUTO: 21 % (ref 14–44)
MAGNESIUM SERPL-MCNC: 1.7 MG/DL (ref 1.9–2.7)
MCH RBC QN AUTO: 32.2 PG (ref 26.8–34.3)
MCHC RBC AUTO-ENTMCNC: 33.8 G/DL (ref 31.4–37.4)
MCV RBC AUTO: 96 FL (ref 82–98)
MONOCYTES # BLD AUTO: 1.53 THOUSAND/ÂΜL (ref 0.17–1.22)
MONOCYTES NFR BLD AUTO: 16 % (ref 4–12)
NEUTROPHILS # BLD AUTO: 5.72 THOUSANDS/ÂΜL (ref 1.85–7.62)
NEUTS SEG NFR BLD AUTO: 57 % (ref 43–75)
NRBC BLD AUTO-RTO: 1 /100 WBCS
PHOSPHATE SERPL-MCNC: 3.2 MG/DL (ref 2.7–4.5)
PLATELET # BLD AUTO: 169 THOUSANDS/UL (ref 149–390)
PMV BLD AUTO: 11.3 FL (ref 8.9–12.7)
POTASSIUM SERPL-SCNC: 3.9 MMOL/L (ref 3.5–5.3)
PROCALCITONIN SERPL-MCNC: 0.65 NG/ML
RBC # BLD AUTO: 3.35 MILLION/UL (ref 3.88–5.62)
SODIUM SERPL-SCNC: 140 MMOL/L (ref 135–147)
TSH SERPL DL<=0.05 MIU/L-ACNC: 4.61 UIU/ML (ref 0.45–4.5)
WBC # BLD AUTO: 9.8 THOUSAND/UL (ref 4.31–10.16)

## 2024-06-08 PROCEDURE — 83735 ASSAY OF MAGNESIUM: CPT | Performed by: NURSE PRACTITIONER

## 2024-06-08 PROCEDURE — C9254 INJECTION, LACOSAMIDE: HCPCS | Performed by: NURSE PRACTITIONER

## 2024-06-08 PROCEDURE — 84145 PROCALCITONIN (PCT): CPT

## 2024-06-08 PROCEDURE — 94760 N-INVAS EAR/PLS OXIMETRY 1: CPT

## 2024-06-08 PROCEDURE — 82948 REAGENT STRIP/BLOOD GLUCOSE: CPT

## 2024-06-08 PROCEDURE — 80048 BASIC METABOLIC PNL TOTAL CA: CPT | Performed by: NURSE PRACTITIONER

## 2024-06-08 PROCEDURE — 84439 ASSAY OF FREE THYROXINE: CPT | Performed by: ANESTHESIOLOGY

## 2024-06-08 PROCEDURE — 84443 ASSAY THYROID STIM HORMONE: CPT | Performed by: ANESTHESIOLOGY

## 2024-06-08 PROCEDURE — 84100 ASSAY OF PHOSPHORUS: CPT | Performed by: NURSE PRACTITIONER

## 2024-06-08 PROCEDURE — 85025 COMPLETE CBC W/AUTO DIFF WBC: CPT | Performed by: NURSE PRACTITIONER

## 2024-06-08 PROCEDURE — 71045 X-RAY EXAM CHEST 1 VIEW: CPT

## 2024-06-08 PROCEDURE — 99233 SBSQ HOSP IP/OBS HIGH 50: CPT | Performed by: ANESTHESIOLOGY

## 2024-06-08 RX ORDER — TIZANIDINE 2 MG/1
4 TABLET ORAL 3 TIMES DAILY
Status: DISCONTINUED | OUTPATIENT
Start: 2024-06-08 | End: 2024-06-09

## 2024-06-08 RX ORDER — ACETAMINOPHEN 325 MG/1
975 TABLET ORAL EVERY 8 HOURS SCHEDULED
Status: DISCONTINUED | OUTPATIENT
Start: 2024-06-08 | End: 2024-06-21 | Stop reason: HOSPADM

## 2024-06-08 RX ORDER — POTASSIUM CHLORIDE 20MEQ/15ML
20 LIQUID (ML) ORAL ONCE
Status: COMPLETED | OUTPATIENT
Start: 2024-06-08 | End: 2024-06-08

## 2024-06-08 RX ORDER — DIVALPROEX SODIUM 250 MG/1
500 TABLET, DELAYED RELEASE ORAL EVERY MORNING
Status: DISCONTINUED | OUTPATIENT
Start: 2024-06-08 | End: 2024-06-09

## 2024-06-08 RX ORDER — LEVETIRACETAM 500 MG/5ML
1500 INJECTION, SOLUTION, CONCENTRATE INTRAVENOUS ONCE
Status: COMPLETED | OUTPATIENT
Start: 2024-06-08 | End: 2024-06-08

## 2024-06-08 RX ORDER — OXYCODONE HYDROCHLORIDE 5 MG/1
5 TABLET ORAL EVERY 6 HOURS PRN
Status: DISCONTINUED | OUTPATIENT
Start: 2024-06-08 | End: 2024-06-11

## 2024-06-08 RX ORDER — QUETIAPINE FUMARATE 50 MG/1
150 TABLET, EXTENDED RELEASE ORAL 2 TIMES DAILY
Status: DISCONTINUED | OUTPATIENT
Start: 2024-06-08 | End: 2024-06-09

## 2024-06-08 RX ORDER — MAGNESIUM SULFATE HEPTAHYDRATE 40 MG/ML
4 INJECTION, SOLUTION INTRAVENOUS ONCE
Status: COMPLETED | OUTPATIENT
Start: 2024-06-08 | End: 2024-06-08

## 2024-06-08 RX ORDER — KETOROLAC TROMETHAMINE 30 MG/ML
15 INJECTION, SOLUTION INTRAMUSCULAR; INTRAVENOUS EVERY 6 HOURS SCHEDULED
Status: DISPENSED | OUTPATIENT
Start: 2024-06-08 | End: 2024-06-09

## 2024-06-08 RX ORDER — FUROSEMIDE 10 MG/ML
20 INJECTION INTRAMUSCULAR; INTRAVENOUS ONCE
Status: COMPLETED | OUTPATIENT
Start: 2024-06-08 | End: 2024-06-08

## 2024-06-08 RX ORDER — DIVALPROEX SODIUM 250 MG/1
1000 TABLET, DELAYED RELEASE ORAL
Status: DISCONTINUED | OUTPATIENT
Start: 2024-06-08 | End: 2024-06-09

## 2024-06-08 RX ORDER — OXYCODONE HYDROCHLORIDE 10 MG/1
10 TABLET ORAL EVERY 6 HOURS PRN
Status: DISCONTINUED | OUTPATIENT
Start: 2024-06-08 | End: 2024-06-11

## 2024-06-08 RX ORDER — ALBUMIN, HUMAN INJ 5% 5 %
25 SOLUTION INTRAVENOUS ONCE
Status: COMPLETED | OUTPATIENT
Start: 2024-06-08 | End: 2024-06-08

## 2024-06-08 RX ORDER — LACOSAMIDE 10 MG/ML
200 INJECTION, SOLUTION INTRAVENOUS ONCE
Status: COMPLETED | OUTPATIENT
Start: 2024-06-08 | End: 2024-06-08

## 2024-06-08 RX ORDER — QUETIAPINE 200 MG/1
400 TABLET, FILM COATED, EXTENDED RELEASE ORAL
Status: DISCONTINUED | OUTPATIENT
Start: 2024-06-08 | End: 2024-06-09

## 2024-06-08 RX ORDER — MAGNESIUM SULFATE 1 G/100ML
1 INJECTION INTRAVENOUS ONCE
Status: COMPLETED | OUTPATIENT
Start: 2024-06-08 | End: 2024-06-08

## 2024-06-08 RX ADMIN — METRONIDAZOLE 500 MG: 500 INJECTION, SOLUTION INTRAVENOUS at 00:23

## 2024-06-08 RX ADMIN — LIDOCAINE 5% 2 PATCH: 700 PATCH TOPICAL at 09:54

## 2024-06-08 RX ADMIN — MAGNESIUM SULFATE HEPTAHYDRATE 1 G: 1 INJECTION, SOLUTION INTRAVENOUS at 05:36

## 2024-06-08 RX ADMIN — KETOROLAC TROMETHAMINE 15 MG: 30 INJECTION, SOLUTION INTRAMUSCULAR; INTRAVENOUS at 12:27

## 2024-06-08 RX ADMIN — LEVETIRACETAM 1500 MG: 500 TABLET, FILM COATED ORAL at 09:54

## 2024-06-08 RX ADMIN — ACETAMINOPHEN 1000 MG: 10 INJECTION INTRAVENOUS at 03:25

## 2024-06-08 RX ADMIN — Medication 20 MG: at 09:55

## 2024-06-08 RX ADMIN — ALBUMIN (HUMAN) 25 G: 12.5 INJECTION, SOLUTION INTRAVENOUS at 20:05

## 2024-06-08 RX ADMIN — INSULIN LISPRO 1 UNITS: 100 INJECTION, SOLUTION INTRAVENOUS; SUBCUTANEOUS at 12:32

## 2024-06-08 RX ADMIN — SENNOSIDES AND DOCUSATE SODIUM 2 TABLET: 50; 8.6 TABLET ORAL at 16:43

## 2024-06-08 RX ADMIN — CHLORHEXIDINE GLUCONATE 0.12% ORAL RINSE 15 ML: 1.2 LIQUID ORAL at 10:00

## 2024-06-08 RX ADMIN — POTASSIUM CHLORIDE 20 MEQ: 1.5 SOLUTION ORAL at 10:24

## 2024-06-08 RX ADMIN — CHLORHEXIDINE GLUCONATE 0.12% ORAL RINSE 15 ML: 1.2 LIQUID ORAL at 23:31

## 2024-06-08 RX ADMIN — OXYCODONE HYDROCHLORIDE 10 MG: 10 TABLET ORAL at 11:30

## 2024-06-08 RX ADMIN — LEVETIRACETAM 1500 MG: 100 INJECTION, SOLUTION INTRAVENOUS at 23:31

## 2024-06-08 RX ADMIN — GABAPENTIN 400 MG: 400 CAPSULE ORAL at 09:54

## 2024-06-08 RX ADMIN — FUROSEMIDE 20 MG: 10 INJECTION, SOLUTION INTRAMUSCULAR; INTRAVENOUS at 12:27

## 2024-06-08 RX ADMIN — LACOSAMIDE 150 MG: 100 TABLET, FILM COATED ORAL at 09:54

## 2024-06-08 RX ADMIN — MAGNESIUM SULFATE HEPTAHYDRATE 4 G: 40 INJECTION, SOLUTION INTRAVENOUS at 09:53

## 2024-06-08 RX ADMIN — ONDANSETRON 4 MG: 2 INJECTION INTRAMUSCULAR; INTRAVENOUS at 11:08

## 2024-06-08 RX ADMIN — ENOXAPARIN SODIUM 40 MG: 40 INJECTION SUBCUTANEOUS at 09:53

## 2024-06-08 RX ADMIN — METHOCARBAMOL TABLETS 500 MG: 500 TABLET, COATED ORAL at 05:36

## 2024-06-08 RX ADMIN — KETOROLAC TROMETHAMINE 15 MG: 30 INJECTION, SOLUTION INTRAMUSCULAR; INTRAVENOUS at 17:14

## 2024-06-08 RX ADMIN — LACOSAMIDE 200 MG: 10 INJECTION INTRAVENOUS at 23:31

## 2024-06-08 RX ADMIN — LORATADINE 10 MG: 10 TABLET ORAL at 09:54

## 2024-06-08 RX ADMIN — PRAVASTATIN SODIUM 80 MG: 80 TABLET ORAL at 16:40

## 2024-06-08 RX ADMIN — LEVOTHYROXINE SODIUM 150 MCG: 0.15 TABLET ORAL at 09:54

## 2024-06-08 RX ADMIN — Medication 2000 MG: at 04:08

## 2024-06-08 RX ADMIN — CEFTRIAXONE SODIUM 1000 MG: 10 INJECTION, POWDER, FOR SOLUTION INTRAVENOUS at 13:06

## 2024-06-08 RX ADMIN — METRONIDAZOLE 500 MG: 500 INJECTION, SOLUTION INTRAVENOUS at 10:00

## 2024-06-08 RX ADMIN — QUETIAPINE FUMARATE 150 MG: 50 TABLET, EXTENDED RELEASE ORAL at 16:41

## 2024-06-08 RX ADMIN — GABAPENTIN 400 MG: 400 CAPSULE ORAL at 16:40

## 2024-06-08 RX ADMIN — TIZANIDINE 4 MG: 2 TABLET ORAL at 16:42

## 2024-06-08 RX ADMIN — SENNOSIDES AND DOCUSATE SODIUM 2 TABLET: 50; 8.6 TABLET ORAL at 10:25

## 2024-06-08 RX ADMIN — ACETAMINOPHEN 975 MG: 325 TABLET, FILM COATED ORAL at 16:40

## 2024-06-08 NOTE — QUICK NOTE
Chest tube discontinued after requesting patient to hold his breath with maximum inflation. SpO2 92% before removal and 91% five minutes after. Four hour CXR follow-up ordered.

## 2024-06-08 NOTE — CASE MANAGEMENT
Case Management Discharge Planning Note    Patient name Jairon Oconnell  Location ICU 10/ICU 10 MRN [id number]  : 1965 Date 2024       Current Admission Date: 6/3/2024  Current Admission Diagnosis:Cardiac arrest (HCC)   Patient Active Problem List    Diagnosis Date Noted Date Diagnosed    Acute metabolic encephalopathy 2024     Cardiac arrest (HCC) 2024     Dysphagia 2024     Closed traumatic fracture of ribs of right side with pneumothorax 2024     Acute respiratory failure with hypoxia and hypercapnia ([facility]) 2024     Urinary retention 2024     Nondisplaced fracture of greater trochanter of right femur, subsequent encounter for closed fracture with routine healing 2024     Closed nondisplaced fracture of proximal phalanx of left index finger with routine healing, subsequent encounter 10/30/2023     Pressure ulcer of sacral region, stage 3 ([facility]) 2023     Ambulatory dysfunction 2022     Social problem 2021     Hypomagnesemia 2021     Thrombocytopathia ([facility]) 2020     Hyponatremia 2019     Metabolic encephalopathy 2019     Seborrheic dermatitis of scalp 2019     Nearsightedness 2019     Behavior concern in adult 2019     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     Vitamin B12 deficiency 2017     Mixed hyperlipidemia 2016     Vitamin D deficiency 2016     Type 2 diabetes mellitus with diabetic neuropathy, without long-term current use of insulin ([facility]) 06/15/2016     Acquired hypothyroidism 06/15/2016     Cataract 2013     Ataxic cerebral palsy (HCC) 2013     Generalized convulsive epilepsy ([facility]) 2013     Essential hypertension 2013     Osteoporosis 2013     Scoliosis 2013       LOS (days): 5  Geometric Mean LOS (GMLOS) (days): 5.2  Days to GMLOS:0.7     OBJECTIVE:  Risk of Unplanned Readmission Score: 26.36         Current admission  status: Inpatient   Preferred Pharmacy:   [facility] [place] - [address]  [address]  [place] [address]  Phone: [phone] Fax: [phone]    [id number] [place] - [address]  [address]  [place] [place] [facility] [place] [address]  Phone: [phone] Fax: [phone]    Primary Care Provider: HUNTER Brown    Primary Insurance: MEDICARE  Secondary Insurance: [place] [employer]    DISCHARGE DETAILS:                                          Other Referral/Resources/Interventions Provided:  Referral Comments: S/W caregiver, Betty, at bedside on 6/7 when dropping off choice list.  Betty reports pt has been seen by [facility] in past and would like to use them again.  Agency reserved in [facility] on 6/8.         Treatment Team Recommendation: SNF, Short Term Rehab, Home with Home Health Care  Discharge Destination Plan:: Home with Home Health Care  Transport at Discharge : Family

## 2024-06-08 NOTE — ASSESSMENT & PLAN NOTE
Now s/p self extubation  Able to nod appropriately to question, but majority of speech is incomprehensible at baseline  Serial neuro checks  CAM ICU  Delerium precautions

## 2024-06-08 NOTE — PROGRESS NOTES
"[facility]  Progress Note  Name: Jairon Oconnell I  MRN: [id number]  Unit/Bed#: ICU 10 I Date of Admission: 6/3/2024   Date of Service: 6/8/2024 I Hospital Day: 5    Assessment & Plan   * Cardiac arrest (HCC)  Assessment & Plan  Thought to be secondary to hypoxia secondary to airway obstruction in the setting of chocking  Now post self extubation, purposeful movement  WIth residual AHRF, 2/2 rib fractures vs aspiration as below  ECHO- EF 55%  Lactic acidosis, cardiac enzymes all decreasing  Currently HD stable aside from hypoxia    Acute respiratory failure with hypoxia and hypercapnia (HCC)  Assessment & Plan  S/p self extubation 6/4  With residual AHRF in setting of rib fractures, likely aspiration  Tolerating 5 L nasal cannula  Wean as able to maintain Spo2 >92%        Closed traumatic fracture of ribs of right side with pneumothorax  Assessment & Plan  S/p 10 Fr right chest tube  Remains on water seal   Aggressive, multi-modal pain control    Dysphagia  Assessment & Plan  Speech recommending NPO     Acute metabolic encephalopathy  Assessment & Plan  Now s/p self extubation  Able to nod appropriately to question, but majority of speech is incomprehensible at baseline  Serial neuro checks  CAM ICU  Delerium precautions    Ambulatory dysfunction  Assessment & Plan  PT/OT when appropriate  Fall precautions    Behavior concern in adult  Assessment & Plan  Holding home serouqel  Consider restarting when O2 requirements improved     Acquired hypothyroidism  Assessment & Plan  Continue home levothyroxine    Generalized convulsive epilepsy (HCC)  Assessment & Plan  Continue home medications  Holding home Depakote as patient reportedly requires \"brand name\" Depakote             Disposition: Stepdown Level 1    ICU Core Measures     A: Assess, Prevent, and Manage Pain Has pain been assessed? Yes  Need for changes to pain regimen? No   B: Both SAT/SAT  N/A   C: Choice of Sedation RASS Goal: N/A " patient not on sedation  Need for changes to sedation or analgesia regimen? NA   D: Delirium CAM-ICU: Unable to perform secondary to Dementia or other chronic cognitive dysfunction   E: Early Mobility  Plan for early mobility? Yes   F: Family Engagement Plan for family engagement today? Yes       Antibiotic Review: Continue broad spectrum secondary to severity of illness.     Review of Invasive Devices:            Prophylaxis:  VTE VTE covered by:  enoxaparin, Subcutaneous, 40 mg at 06/07/24 1110       Stress Ulcer  covered byomeprazole (PRILOSEC) suspension 2 mg/mL [[id number]]         Significant 24hr Events     24hr events: Hemodynamically stable overnight.  Oxygen weaned to 2 L nasal cannula.  Patient at neurological baseline.     Subjective        Objective                            Vitals I/O      Most Recent Min/Max in 24hrs   Temp 98.4 °F (36.9 °C) Temp  Min: 97.7 °F (36.5 °C)  Max: 99.2 °F (37.3 °C)   Pulse 78 Pulse  Min: 78  Max: 92   Resp 15 Resp  Min: 13  Max: 37   /67 BP  Min: 146/67  Max: 166/78   O2 Sat 99 % SpO2  Min: 92 %  Max: 99 %      Intake/Output Summary (Last 24 hours) at 6/8/2024 0504  Last data filed at 6/8/2024 0338  Gross per 24 hour   Intake 400 ml   Output 2250 ml   Net -1850 ml       Diet Dysphagia/Modified Consistency; Dysphagia 1-Pureed; Honey Thick Liquid; Honey Thick Liquid    Invasive Monitoring           Physical Exam   Physical Exam  Vitals and nursing note reviewed.   Eyes:      General: No scleral icterus.     Conjunctiva/sclera: Conjunctivae normal.   Skin:     General: Skin is warm and dry.      Capillary Refill: Capillary refill takes less than 2 seconds.      Coloration: Skin is not jaundiced.   HENT:      Head: Normocephalic and atraumatic.      Mouth/Throat:      Mouth: Mucous membranes are dry.   Neck:      Vascular: No JVD.   Cardiovascular:      Rate and Rhythm: Normal rate and regular rhythm.      Pulses: Normal pulses.      Heart sounds: Normal heart sounds.    Abdominal: General: Bowel sounds are normal. There is no distension.      Palpations: Abdomen is soft.      Tenderness: There is no abdominal tenderness. There is no guarding.   Constitutional:       General: He is not in acute distress.  Pulmonary:      Effort: Pulmonary effort is normal. No accessory muscle usage, respiratory distress or accessory muscle usage.      Breath sounds: Normal breath sounds. No rhonchi.   Chest:      Chest wall: Tenderness present.   Neurological:      Mental Status: Mental status is at baseline.      Motor: gross motor function is at baseline for patient.            Diagnostic Studies      EKG: Sinus rhythm  Imaging:  I have personally reviewed pertinent reports.   and I have personally reviewed pertinent films in PACS     Medications:  Scheduled PRN   acetaminophen, 1,000 mg, Q8H  cefepime, 2,000 mg, Q12H  chlorhexidine, 15 mL, Q12H RONALD  enoxaparin, 40 mg, Daily  gabapentin, 400 mg, TID  insulin lispro, 1-6 Units, Q6H RONALD  lacosamide, 150 mg, QAM  lacosamide, 200 mg, HS  levETIRAcetam, 1,500 mg, Q12H RONALD  levothyroxine, 150 mcg, Daily  lidocaine, 2 patch, Daily  loratadine, 10 mg, Daily  methocarbamol, 500 mg, Q8H RONALD  metroNIDAZOLE, 500 mg, Q8H  omeprazole (PRILOSEC) suspension 2 mg/mL, 20 mg, Daily  pravastatin, 80 mg, Daily With Dinner  senna-docusate sodium, 2 tablet, BID  zonisamide, 300 mg, HS      ondansetron, 4 mg, Q6H PRN  oxyCODONE, 5 mg, Q6H PRN   Or  oxyCODONE, 10 mg, Q6H PRN       Continuous          Labs:    CBC    Recent Labs     06/06/24  0534 06/07/24  0518 06/08/24  0402   WBC 8.40 9.55 9.80   HGB 11.0* 11.1* 10.8*   HCT 32.8* 32.4* 32.0*   * 153 169   BANDSPCT 18*  --   --      BMP    Recent Labs     06/07/24  0518 06/08/24  0426   SODIUM 141 140   K 3.9 3.9    107   CO2 25 27   AGAP 9 6   BUN 21 24   CREATININE 0.67 0.58*   CALCIUM 8.9 8.6       Coags    No recent results     Additional Electrolytes  Recent Labs     06/06/24  0534 06/07/24  0518  06/08/24  0426   MG  --  1.7* 1.7*   PHOS  --  2.5*  --    CAIONIZED 1.13 1.15  --           Blood Gas    No recent results  No recent results LFTs  No recent results    Infectious  Recent Labs     06/07/24  0518   PROCALCITONI 1.16*     Glucose  Recent Labs     06/07/24 0518 06/08/24  0426   GLUC 129 128               HUNTER Bermeo

## 2024-06-08 NOTE — ASSESSMENT & PLAN NOTE
S/p self extubation 6/4  With residual AHRF in setting of rib fractures, likely aspiration  Tolerating 5 L nasal cannula  Wean as able to maintain Spo2 >92%

## 2024-06-09 ENCOUNTER — APPOINTMENT (INPATIENT)
Dept: RADIOLOGY | Facility: HOSPITAL | Age: 59
DRG: 208 | End: 2024-06-09
Payer: MEDICARE

## 2024-06-09 ENCOUNTER — APPOINTMENT (INPATIENT)
Dept: CT IMAGING | Facility: HOSPITAL | Age: 59
DRG: 208 | End: 2024-06-09
Payer: MEDICARE

## 2024-06-09 LAB
ABO GROUP BLD: NORMAL
ABO GROUP BLD: NORMAL
ANION GAP SERPL CALCULATED.3IONS-SCNC: 6 MMOL/L (ref 4–13)
BACTERIA BLD CULT: NORMAL
BACTERIA BLD CULT: NORMAL
BASOPHILS # BLD AUTO: 0.07 THOUSANDS/ÂΜL (ref 0–0.1)
BASOPHILS NFR BLD AUTO: 1 % (ref 0–1)
BLD GP AB SCN SERPL QL: NEGATIVE
BUN SERPL-MCNC: 35 MG/DL (ref 5–25)
CALCIUM SERPL-MCNC: 8.5 MG/DL (ref 8.4–10.2)
CHLORIDE SERPL-SCNC: 109 MMOL/L (ref 96–108)
CO2 SERPL-SCNC: 29 MMOL/L (ref 21–32)
CREAT SERPL-MCNC: 0.8 MG/DL (ref 0.6–1.3)
EOSINOPHIL # BLD AUTO: 0.27 THOUSAND/ÂΜL (ref 0–0.61)
EOSINOPHIL NFR BLD AUTO: 3 % (ref 0–6)
ERYTHROCYTE [DISTWIDTH] IN BLOOD BY AUTOMATED COUNT: 13.9 % (ref 11.6–15.1)
ERYTHROCYTE [DISTWIDTH] IN BLOOD BY AUTOMATED COUNT: 14 % (ref 11.6–15.1)
GFR SERPL CREATININE-BSD FRML MDRD: 99 ML/MIN/1.73SQ M
GLUCOSE SERPL-MCNC: 154 MG/DL (ref 65–140)
HCT VFR BLD AUTO: 23.4 % (ref 36.5–49.3)
HCT VFR BLD AUTO: 24 % (ref 36.5–49.3)
HCT VFR BLD AUTO: 26 % (ref 36.5–49.3)
HGB BLD-MCNC: 7.8 G/DL (ref 12–17)
HGB BLD-MCNC: 8 G/DL (ref 12–17)
HGB BLD-MCNC: 8.6 G/DL (ref 12–17)
IMM GRANULOCYTES # BLD AUTO: >0.5 THOUSAND/UL (ref 0–0.2)
IMM GRANULOCYTES NFR BLD AUTO: 8 % (ref 0–2)
LYMPHOCYTES # BLD AUTO: 2.34 THOUSANDS/ÂΜL (ref 0.6–4.47)
LYMPHOCYTES NFR BLD AUTO: 25 % (ref 14–44)
MAGNESIUM SERPL-MCNC: 2.4 MG/DL (ref 1.9–2.7)
MCH RBC QN AUTO: 32.4 PG (ref 26.8–34.3)
MCH RBC QN AUTO: 32.5 PG (ref 26.8–34.3)
MCHC RBC AUTO-ENTMCNC: 33.3 G/DL (ref 31.4–37.4)
MCHC RBC AUTO-ENTMCNC: 33.3 G/DL (ref 31.4–37.4)
MCV RBC AUTO: 97 FL (ref 82–98)
MCV RBC AUTO: 98 FL (ref 82–98)
MONOCYTES # BLD AUTO: 1.7 THOUSAND/ÂΜL (ref 0.17–1.22)
MONOCYTES NFR BLD AUTO: 18 % (ref 4–12)
NEUTROPHILS # BLD AUTO: 4.25 THOUSANDS/ÂΜL (ref 1.85–7.62)
NEUTS SEG NFR BLD AUTO: 45 % (ref 43–75)
NRBC BLD AUTO-RTO: 1 /100 WBCS
PHOSPHATE SERPL-MCNC: 4.6 MG/DL (ref 2.7–4.5)
PLATELET # BLD AUTO: 183 THOUSANDS/UL (ref 149–390)
PLATELET # BLD AUTO: 195 THOUSANDS/UL (ref 149–390)
PMV BLD AUTO: 10.6 FL (ref 8.9–12.7)
PMV BLD AUTO: 10.6 FL (ref 8.9–12.7)
POTASSIUM SERPL-SCNC: 4.2 MMOL/L (ref 3.5–5.3)
RBC # BLD AUTO: 2.4 MILLION/UL (ref 3.88–5.62)
RBC # BLD AUTO: 2.47 MILLION/UL (ref 3.88–5.62)
RH BLD: POSITIVE
RH BLD: POSITIVE
SODIUM SERPL-SCNC: 144 MMOL/L (ref 135–147)
SPECIMEN EXPIRATION DATE: NORMAL
T4 FREE SERPL-MCNC: 0.57 NG/DL (ref 0.61–1.12)
WBC # BLD AUTO: 9.09 THOUSAND/UL (ref 4.31–10.16)
WBC # BLD AUTO: 9.36 THOUSAND/UL (ref 4.31–10.16)

## 2024-06-09 PROCEDURE — 74177 CT ABD & PELVIS W/CONTRAST: CPT

## 2024-06-09 PROCEDURE — 32551 INSERTION OF CHEST TUBE: CPT | Performed by: ANESTHESIOLOGY

## 2024-06-09 PROCEDURE — 86900 BLOOD TYPING SEROLOGIC ABO: CPT | Performed by: NURSE PRACTITIONER

## 2024-06-09 PROCEDURE — 84100 ASSAY OF PHOSPHORUS: CPT | Performed by: NURSE PRACTITIONER

## 2024-06-09 PROCEDURE — 85027 COMPLETE CBC AUTOMATED: CPT | Performed by: NURSE PRACTITIONER

## 2024-06-09 PROCEDURE — 80048 BASIC METABOLIC PNL TOTAL CA: CPT | Performed by: NURSE PRACTITIONER

## 2024-06-09 PROCEDURE — 83735 ASSAY OF MAGNESIUM: CPT | Performed by: NURSE PRACTITIONER

## 2024-06-09 PROCEDURE — 85025 COMPLETE CBC W/AUTO DIFF WBC: CPT | Performed by: NURSE PRACTITIONER

## 2024-06-09 PROCEDURE — 99233 SBSQ HOSP IP/OBS HIGH 50: CPT | Performed by: NURSE PRACTITIONER

## 2024-06-09 PROCEDURE — 85014 HEMATOCRIT: CPT | Performed by: ANESTHESIOLOGY

## 2024-06-09 PROCEDURE — 71260 CT THORAX DX C+: CPT

## 2024-06-09 PROCEDURE — 71045 X-RAY EXAM CHEST 1 VIEW: CPT

## 2024-06-09 PROCEDURE — 92526 ORAL FUNCTION THERAPY: CPT

## 2024-06-09 PROCEDURE — 71250 CT THORAX DX C-: CPT

## 2024-06-09 PROCEDURE — 74176 CT ABD & PELVIS W/O CONTRAST: CPT

## 2024-06-09 PROCEDURE — 94760 N-INVAS EAR/PLS OXIMETRY 1: CPT

## 2024-06-09 PROCEDURE — 86850 RBC ANTIBODY SCREEN: CPT | Performed by: NURSE PRACTITIONER

## 2024-06-09 PROCEDURE — 85018 HEMOGLOBIN: CPT | Performed by: ANESTHESIOLOGY

## 2024-06-09 PROCEDURE — 0W9900Z DRAINAGE OF RIGHT PLEURAL CAVITY WITH DRAINAGE DEVICE, OPEN APPROACH: ICD-10-PCS | Performed by: ANESTHESIOLOGY

## 2024-06-09 PROCEDURE — 86901 BLOOD TYPING SEROLOGIC RH(D): CPT | Performed by: NURSE PRACTITIONER

## 2024-06-09 RX ORDER — HALOPERIDOL 5 MG/ML
2.5 INJECTION INTRAMUSCULAR ONCE
Status: DISCONTINUED | OUTPATIENT
Start: 2024-06-09 | End: 2024-06-09

## 2024-06-09 RX ORDER — LIDOCAINE HYDROCHLORIDE 10 MG/ML
20 INJECTION, SOLUTION EPIDURAL; INFILTRATION; INTRACAUDAL; PERINEURAL ONCE
Status: COMPLETED | OUTPATIENT
Start: 2024-06-09 | End: 2024-06-09

## 2024-06-09 RX ORDER — FENTANYL CITRATE 50 UG/ML
50 INJECTION, SOLUTION INTRAMUSCULAR; INTRAVENOUS ONCE
Status: COMPLETED | OUTPATIENT
Start: 2024-06-09 | End: 2024-06-09

## 2024-06-09 RX ORDER — FENTANYL CITRATE 50 UG/ML
50 INJECTION, SOLUTION INTRAMUSCULAR; INTRAVENOUS ONCE
Status: DISCONTINUED | OUTPATIENT
Start: 2024-06-09 | End: 2024-06-09

## 2024-06-09 RX ORDER — DIVALPROEX SODIUM 125 MG/1
500 CAPSULE, COATED PELLETS ORAL EVERY 8 HOURS SCHEDULED
Status: DISCONTINUED | OUTPATIENT
Start: 2024-06-09 | End: 2024-06-21 | Stop reason: HOSPADM

## 2024-06-09 RX ORDER — MIDAZOLAM HYDROCHLORIDE 2 MG/2ML
2 INJECTION, SOLUTION INTRAMUSCULAR; INTRAVENOUS ONCE
Status: COMPLETED | OUTPATIENT
Start: 2024-06-09 | End: 2024-06-09

## 2024-06-09 RX ORDER — LIDOCAINE HYDROCHLORIDE 10 MG/ML
INJECTION, SOLUTION EPIDURAL; INFILTRATION; INTRACAUDAL; PERINEURAL
Status: COMPLETED
Start: 2024-06-09 | End: 2024-06-09

## 2024-06-09 RX ORDER — BISACODYL 10 MG
10 SUPPOSITORY, RECTAL RECTAL ONCE
Status: COMPLETED | OUTPATIENT
Start: 2024-06-09 | End: 2024-06-09

## 2024-06-09 RX ORDER — QUETIAPINE FUMARATE 100 MG/1
400 TABLET, FILM COATED ORAL
Status: DISCONTINUED | OUTPATIENT
Start: 2024-06-09 | End: 2024-06-21 | Stop reason: HOSPADM

## 2024-06-09 RX ORDER — MIDAZOLAM HYDROCHLORIDE 2 MG/2ML
2 INJECTION, SOLUTION INTRAMUSCULAR; INTRAVENOUS ONCE
Status: DISCONTINUED | OUTPATIENT
Start: 2024-06-09 | End: 2024-06-09

## 2024-06-09 RX ADMIN — METHYLNALTREXONE BROMIDE 12 MG: 12 INJECTION, SOLUTION SUBCUTANEOUS at 09:30

## 2024-06-09 RX ADMIN — IOHEXOL 100 ML: 350 INJECTION, SOLUTION INTRAVENOUS at 13:19

## 2024-06-09 RX ADMIN — ACETAMINOPHEN 975 MG: 325 TABLET, FILM COATED ORAL at 21:16

## 2024-06-09 RX ADMIN — CEFTRIAXONE SODIUM 1000 MG: 10 INJECTION, POWDER, FOR SOLUTION INTRAVENOUS at 12:59

## 2024-06-09 RX ADMIN — GABAPENTIN 400 MG: 400 CAPSULE ORAL at 18:10

## 2024-06-09 RX ADMIN — LIDOCAINE HYDROCHLORIDE: 10 INJECTION, SOLUTION EPIDURAL; INFILTRATION; INTRACAUDAL; PERINEURAL at 15:45

## 2024-06-09 RX ADMIN — OXYCODONE HYDROCHLORIDE 10 MG: 10 TABLET ORAL at 19:27

## 2024-06-09 RX ADMIN — GABAPENTIN 400 MG: 400 CAPSULE ORAL at 21:18

## 2024-06-09 RX ADMIN — MIDAZOLAM 2 MG: 1 INJECTION INTRAMUSCULAR; INTRAVENOUS at 15:09

## 2024-06-09 RX ADMIN — PRAVASTATIN SODIUM 80 MG: 80 TABLET ORAL at 18:11

## 2024-06-09 RX ADMIN — FENTANYL CITRATE 50 MCG: 50 INJECTION, SOLUTION INTRAMUSCULAR; INTRAVENOUS at 15:24

## 2024-06-09 RX ADMIN — QUETIAPINE FUMARATE 400 MG: 100 TABLET ORAL at 21:44

## 2024-06-09 RX ADMIN — LORATADINE 10 MG: 10 TABLET ORAL at 09:29

## 2024-06-09 RX ADMIN — LACOSAMIDE 200 MG: 100 TABLET, FILM COATED ORAL at 21:17

## 2024-06-09 RX ADMIN — ACETAMINOPHEN 975 MG: 325 TABLET, FILM COATED ORAL at 05:06

## 2024-06-09 RX ADMIN — LEVETIRACETAM 1500 MG: 500 TABLET, FILM COATED ORAL at 21:17

## 2024-06-09 RX ADMIN — CHLORHEXIDINE GLUCONATE 0.12% ORAL RINSE 15 ML: 1.2 LIQUID ORAL at 21:18

## 2024-06-09 RX ADMIN — FENTANYL CITRATE 50 MCG: 50 INJECTION, SOLUTION INTRAMUSCULAR; INTRAVENOUS at 15:21

## 2024-06-09 RX ADMIN — DIVALPROEX SODIUM 500 MG: 125 CAPSULE ORAL at 18:29

## 2024-06-09 RX ADMIN — KETOROLAC TROMETHAMINE 15 MG: 30 INJECTION, SOLUTION INTRAMUSCULAR; INTRAVENOUS at 05:45

## 2024-06-09 RX ADMIN — LACOSAMIDE 150 MG: 100 TABLET, FILM COATED ORAL at 09:29

## 2024-06-09 RX ADMIN — QUETIAPINE FUMARATE 150 MG: 50 TABLET, EXTENDED RELEASE ORAL at 09:31

## 2024-06-09 RX ADMIN — LEVETIRACETAM 1500 MG: 500 TABLET, FILM COATED ORAL at 09:28

## 2024-06-09 RX ADMIN — CHLORHEXIDINE GLUCONATE 0.12% ORAL RINSE 15 ML: 1.2 LIQUID ORAL at 09:28

## 2024-06-09 RX ADMIN — FENTANYL CITRATE 50 MCG: 50 INJECTION INTRAMUSCULAR; INTRAVENOUS at 15:09

## 2024-06-09 RX ADMIN — ONDANSETRON 4 MG: 2 INJECTION INTRAMUSCULAR; INTRAVENOUS at 16:52

## 2024-06-09 RX ADMIN — SENNOSIDES AND DOCUSATE SODIUM 2 TABLET: 50; 8.6 TABLET ORAL at 18:10

## 2024-06-09 RX ADMIN — LIDOCAINE 5% 2 PATCH: 700 PATCH TOPICAL at 09:29

## 2024-06-09 RX ADMIN — SENNOSIDES AND DOCUSATE SODIUM 2 TABLET: 50; 8.6 TABLET ORAL at 09:29

## 2024-06-09 RX ADMIN — GABAPENTIN 400 MG: 400 CAPSULE ORAL at 09:29

## 2024-06-09 RX ADMIN — LEVOTHYROXINE SODIUM 150 MCG: 0.15 TABLET ORAL at 09:29

## 2024-06-09 RX ADMIN — BISACODYL 10 MG: 10 SUPPOSITORY RECTAL at 11:18

## 2024-06-09 RX ADMIN — ZONISAMIDE 300 MG: 100 CAPSULE ORAL at 21:19

## 2024-06-09 RX ADMIN — DIVALPROEX SODIUM 500 MG: 125 CAPSULE ORAL at 12:00

## 2024-06-09 RX ADMIN — QUETIAPINE FUMARATE 150 MG: 50 TABLET, EXTENDED RELEASE ORAL at 18:12

## 2024-06-09 NOTE — PLAN OF CARE
Plan/Recommendations:  puree/honey thick liquids.  Meds crushed  1:1 full feeding assist  Alternate solids and liquids  Upright 90' for all meals  ST follow-up x1-3

## 2024-06-09 NOTE — ASSESSMENT & PLAN NOTE
S/p self extubation 6/4  With residual AHRF in setting of rib fractures, likely aspiration  Tolerating 5-6 L nasal cannula  Wean as able to maintain Spo2 >92%

## 2024-06-09 NOTE — PROCEDURES
Chest Tube Insertion    Date/Time: 6/9/2024 3:20 PM    Performed by: Lul Carias MD  Authorized by: Lul Carias MD    Patient location:  Bedside  Other Assisting Provider: Yes (comment)    Consent:     Consent obtained:  Emergent situation (discussed with caregiver and sister who are not decision makers, sister asked to call nursing facility, called and closed on weekend, proceeding for emergent procedure)  Universal protocol:     Procedure explained and questions answered to patient or proxy's satisfaction: yes      Relevant documents present and verified: yes      Test results available and properly labeled: yes      Radiology Images displayed and confirmed.  If images not available, report reviewed: yes      Required blood products, implants, devices, and special equipment available: yes      Site/side marked: yes      Immediately prior to procedure a time out was called: yes      Patient identity confirmed:  Hospital-assigned identification number, anonymous protocol, patient vented/unresponsive and arm band  Pre-procedure details:     Skin preparation:  ChloraPrep    Preparation: Patient was prepped and draped in the usual sterile fashion    Indications:     Indications: hemothorax    Sedation:     Sedation type:  Anxiolysis (fent 50mg + versed 2mg)  Anesthesia (see MAR for exact dosages):     Anesthesia method:  Local infiltration    Local anesthetic:  Lidocaine 1% w/o epi  Procedure details:     Placement location:  Lateral    Laterality:  Right    Approach:  Open    Scalpel size:  10    Tube size (Beninese): 32F.    Dissection instrument:  Finger and Bonny clamp    Ultrasound guidance: no (ultrasound used for intial localization, arm positioned prior to U/S)      Tube connected to:  Suction    Drainage characteristics:  Bloody    Suture material:  0 silk (additional 2.0 silk x2 for partial incision closure)    Dressing:  4x4 sterile gauze and Xeroform gauze  Post-procedure details:     Patient  tolerance of procedure:  Tolerated well, no immediate complications (improvement in O2 sat following placement)  Comments:      Concern for abdominal entry on post-placement CXR. ~700cc output which has significantly slowed. CT placed on waterseal. CT chest, discussed with radiology- new right-sided chest tube with tip terminating in the right retrocrural space with new trace pneumoperitoneum. Thoracic surgery consulted as well as neurosurgery due to proximity to spine. No neurosurgical concerns. Thoracic rec withdrawing CT ~12cm. Repeat CXR shows chest tube over lower medial right hemithorax with mild subcutaneous emphysema in the right chest wall and stable small right pneumothorax. Replaced to suction. No change in output or patient hemodynamics following repositioning.

## 2024-06-09 NOTE — PROGRESS NOTES
[facility]  Progress Note  Name: Jairon Oconenll I  MRN: [id number]  Unit/Bed#: ICU 10 I Date of Admission: 6/3/2024   Date of Service: 6/9/2024 I Hospital Day: 6    Assessment & Plan   * Cardiac arrest (HCC)  Assessment & Plan  Thought to be secondary to hypoxia secondary to airway obstruction in the setting of chocking  Now post self extubation, purposeful movement  WIth residual AHRF, 2/2 rib fractures vs aspiration as below  ECHO- EF 55%      Acute respiratory failure with hypoxia and hypercapnia (HCC)  Assessment & Plan  S/p self extubation 6/4  With residual AHRF in setting of rib fractures, likely aspiration  Tolerating 5-6 L nasal cannula  Wean as able to maintain Spo2 >92%        Closed traumatic fracture of ribs of right side with pneumothorax  Assessment & Plan  S/p 10 Fr right chest tube, discontinued on 6/8  Now with right pleural effusion  Aggressive, multi-modal pain control    Dysphagia  Assessment & Plan  Aspiration precautions  Pills crushed in pudding      Acute metabolic encephalopathy  Assessment & Plan  Now s/p self extubation  Able to nod appropriately to question, but majority of speech is incomprehensible at baseline  Serial neuro checks  CAM ICU  Delirium precautions    Ambulatory dysfunction  Assessment & Plan  PT/OT when appropriate  Fall precautions    Behavior concern in adult  Assessment & Plan  Holding home serouqel  Consider restarting when O2 requirements improved     Acquired hypothyroidism  Assessment & Plan  Continue home levothyroxine    Generalized convulsive epilepsy (HCC)  Assessment & Plan  Continue home medications  Depakote restarted to avoid withdrawal seizures             Disposition: Stepdown Level 1    ICU Core Measures     A: Assess, Prevent, and Manage Pain Has pain been assessed? Yes  Need for changes to pain regimen? No   B: Both SAT/SAT  N/A   C: Choice of Sedation RASS Goal: N/A patient not on sedation  Need for changes to sedation or  analgesia regimen? NA   D: Delirium CAM-ICU: Unable to perform secondary to Dementia or other chronic cognitive dysfunction   E: Early Mobility  Plan for early mobility? Yes   F: Family Engagement Plan for family engagement today? Yes       Antibiotic Review: Continue broad spectrum secondary to severity of illness.     Review of Invasive Devices:            Prophylaxis:  VTE VTE covered by:  enoxaparin, Subcutaneous, 40 mg at 06/08/24 0953       Stress Ulcer  not ordered         Significant 24hr Events     24hr events: Hemodynamically stable overnight.  Patient somewhat somnolent after Zanaflex dosing in the evening, discontinued for same.  Remains on mid flow nasal cannula.     Subjective        Objective                            Vitals I/O      Most Recent Min/Max in 24hrs   Temp (!) 97.4 °F (36.3 °C) Temp  Min: 97.2 °F (36.2 °C)  Max: 98.4 °F (36.9 °C)   Pulse 86 Pulse  Min: 70  Max: 114   Resp 19 Resp  Min: 13  Max: 46   /60 BP  Min: 86/53  Max: 153/73   O2 Sat 95 % SpO2  Min: 81 %  Max: 98 %      Intake/Output Summary (Last 24 hours) at 6/9/2024 0339  Last data filed at 6/8/2024 2102  Gross per 24 hour   Intake 650 ml   Output 1020 ml   Net -370 ml       Diet Dysphagia/Modified Consistency; Dysphagia 1-Pureed; Honey Thick Liquid; Honey Thick Liquid    Invasive Monitoring           Physical Exam   Physical Exam  Vitals and nursing note reviewed.   Eyes:      General: No scleral icterus.     Extraocular Movements: Extraocular movements intact.      Conjunctiva/sclera: Conjunctivae normal.   Skin:     General: Skin is warm and dry.      Capillary Refill: Capillary refill takes less than 2 seconds.   HENT:      Head: Normocephalic and atraumatic.      Mouth/Throat:      Mouth: Mucous membranes are moist.   Neck:      Vascular: No JVD.   Cardiovascular:      Rate and Rhythm: Normal rate and regular rhythm.      Pulses: Normal pulses.      Heart sounds: Normal heart sounds.   Musculoskeletal:          General: Normal range of motion.   Abdominal: General: Bowel sounds are normal. There is no distension.      Palpations: Abdomen is soft.      Tenderness: There is abdominal tenderness. There is no guarding.   Constitutional:       General: He is not in acute distress.     Appearance: He is ill-appearing.   Pulmonary:      Effort: No accessory muscle usage, respiratory distress or accessory muscle usage.      Comments: Breath sounds diminished right greater than left  No subcutaneous air noted nor crepitus  Secretions are normal.Chest:      Chest wall: Tenderness present.   Neurological:      General: No focal deficit present.      Mental Status: Mental status is at baseline. He is somnolent and calm.      Motor: gross motor function is at baseline for patient. No motor deficit.   Genitourinary/Anorectal:  external catheter present.          Diagnostic Studies      EKG: Sinus rhythm  Imaging:  I have personally reviewed pertinent reports.   and I have personally reviewed pertinent films in PACS     Medications:  Scheduled PRN   acetaminophen, 975 mg, Q8H RONALD  cefTRIAXone, 1,000 mg, Q24H  chlorhexidine, 15 mL, Q12H RONALD  divalproex sodium, 1,000 mg, HS  divalproex sodium, 500 mg, QAM  enoxaparin, 40 mg, Daily  gabapentin, 400 mg, TID  ketorolac, 15 mg, Q6H RONALD  lacosamide, 150 mg, QAM  lacosamide, 200 mg, HS  levETIRAcetam, 1,500 mg, Q12H RONALD  levothyroxine, 150 mcg, Daily  lidocaine, 2 patch, Daily  loratadine, 10 mg, Daily  pravastatin, 80 mg, Daily With Dinner  QUEtiapine, 150 mg, BID  QUEtiapine, 400 mg, HS  senna-docusate sodium, 2 tablet, BID  zonisamide, 300 mg, HS      ondansetron, 4 mg, Q6H PRN  oxyCODONE, 5 mg, Q6H PRN   Or  oxyCODONE, 10 mg, Q6H PRN       Continuous          Labs:    CBC    Recent Labs     06/07/24  0518 06/08/24  0402   WBC 9.55 9.80   HGB 11.1* 10.8*   HCT 32.4* 32.0*    169     BMP    Recent Labs     06/07/24  0518 06/08/24  0426   SODIUM 141 140   K 3.9 3.9    107   CO2 25  27   AGAP 9 6   BUN 21 24   CREATININE 0.67 0.58*   CALCIUM 8.9 8.6       Coags    No recent results     Additional Electrolytes  Recent Labs     06/07/24  0518 06/08/24  0426   MG 1.7* 1.7*   PHOS 2.5* 3.2   CAIONIZED 1.15  --           Blood Gas    No recent results  No recent results LFTs  No recent results    Infectious  Recent Labs     06/07/24  0518 06/08/24  0426   PROCALCITONI 1.16* 0.65*     Glucose  Recent Labs     06/07/24  0518 06/08/24  0426   GLUC 129 128               HUNTER Bermeo

## 2024-06-09 NOTE — ASSESSMENT & PLAN NOTE
Daily Note     Today's date: 2019  Patient name: Saskia Sevilla  : 1949  MRN: [id number]  Referring provider: Jessika Koch*  Dx:   Encounter Diagnosis     ICD-10-CM    1  Chronic pain of right knee M25 561     G89 29                   Subjective: Patient reports he is pretty sore today from his right hip to medial and posterior right knee  He has been doing a lot of maintenance work and going up/down ladders and has really worked his legs  Objective: See treatment diary below      Assessment: Tolerated treatment well  Patient demonstrated fatigue post treatment  Patient had soreness and significant pulling with manual quad stretching today  He has good form with added exercises and increased weight with no complaints today  Plan: Progress treatment as tolerated         Precautions: Heart stents, COPD      Manual           Knee PROM flexion NV 8' 7' 5'         Prone quad stretch   NV 3' 5'                                                    Exercise Diary           bike NV 5' 5' 5'         Hamstring long sit stretch NV            Prone Quad stretch with strap 20" x4 20" x4  np 20" x4          gastroc SB stretch NV 20" x4 20"x4 20" x3          Soleus SB stretch NV 20" x3   20"x3 20 " x3          Heel slides 10x5" 10x5"  15x5" 15x5"          glute bridge  2x10 With band   2x10 RTB 2x10 T Tb 2x10          Clamshells  NV R TB 2x10  RTB 2x10 R TB 2x10          Knee ext machine NV 22# x30 22# 2x10 22# x10  33# x10         Ham curl machine NV 22# x20 22# 2x10 22# x10  33# x10          TB side step  NV NV          SLS NV 10" x4 10"x4 10" x4          TKE at ramona  NV nv NV          Forward lunges at bar   NV NV         Leg press   80# 2x10  80# 2x10 85# 3x10          SLR flexion    2# 2x10                                                                  Modalities Thought to be secondary to hypoxia secondary to airway obstruction in the setting of chocking  Now post self extubation, purposeful movement  WIth residual AHRF, 2/2 rib fractures vs aspiration as below  ECHO- EF 55%

## 2024-06-09 NOTE — PLAN OF CARE
Problem: PAIN - ADULT  Goal: Verbalizes/displays adequate comfort level or baseline comfort level  Description: Interventions:  - Encourage patient to monitor pain and request assistance  - Assess pain using appropriate pain scale  - Administer analgesics based on type and severity of pain and evaluate response  - Implement non-pharmacological measures as appropriate and evaluate response  - Consider cultural and social influences on pain and pain management  - Notify physician/advanced practitioner if interventions unsuccessful or patient reports new pain  Outcome: Progressing     Problem: INFECTION - ADULT  Goal: Absence or prevention of progression during hospitalization  Description: INTERVENTIONS:  - Assess and monitor for signs and symptoms of infection  - Monitor lab/diagnostic results  - Monitor all insertion sites, i.e. indwelling lines, tubes, and drains  - Monitor endotracheal if appropriate and nasal secretions for changes in amount and color  - [place] appropriate cooling/warming therapies per order  - Administer medications as ordered  - Instruct and encourage patient and family to use good hand hygiene technique  - Identify and instruct in appropriate isolation precautions for identified infection/condition  Outcome: Progressing     Problem: SAFETY ADULT  Goal: Patient will remain free of falls  Description: INTERVENTIONS:  - Educate patient/family on patient safety including physical limitations  - Instruct patient to call for assistance with activity   - Consult OT/PT to assist with strengthening/mobility   - Keep Call bell within reach  - Keep bed low and locked with side rails adjusted as appropriate  - Keep care items and personal belongings within reach  - Initiate and maintain comfort rounds  - Make Fall Risk Sign visible to staff  - Offer Toileting every 2 Hours, in advance of need  - Initiate/Maintain bed alarm  - Apply yellow socks and bracelet for high fall risk patients  - Consider  moving patient to room near nurses station  Outcome: Progressing  Goal: Maintain or return to baseline ADL function  Description: INTERVENTIONS:  -  Assess patient's ability to carry out ADLs; assess patient's baseline for ADL function and identify physical deficits which impact ability to perform ADLs (bathing, care of mouth/teeth, toileting, grooming, dressing, etc.)  - Assess/evaluate cause of self-care deficits   - Assess range of motion  - Assess patient's mobility; develop plan if impaired  - Assess patient's need for assistive devices and provide as appropriate  - Encourage maximum independence but intervene and supervise when necessary  - Involve family in performance of ADLs  - Assess for home care needs following discharge   - Consider OT consult to assist with ADL evaluation and planning for discharge  - Provide patient education as appropriate  Outcome: Progressing  Goal: Maintains/Returns to pre admission functional level  Description: INTERVENTIONS:  - Perform AM-PAC 6 Click Basic Mobility/ Daily Activity assessment daily.  - Set and communicate daily mobility goal to care team and patient/family/caregiver.   - Collaborate with rehabilitation services on mobility goals if consulted  - Reposition patient every 2 hours.  - Dangle patient 1 times a day  - Stand patient 1 times a day  - Out of bed to chair 1 times a day   - Out of bed for meals 1 times a day  - Out of bed for toileting  - Record patient progress and toleration of activity level   Outcome: Progressing     Problem: DISCHARGE PLANNING  Goal: Discharge to home or other facility with appropriate resources  Description: INTERVENTIONS:  - Identify barriers to discharge w/patient and caregiver  - Arrange for needed discharge resources and transportation as appropriate  - Identify discharge learning needs (meds, wound care, etc.)  - Arrange for interpretive services to assist at discharge as needed  - Refer to Case Management Department for  coordinating discharge planning if the patient needs post-hospital services based on physician/advanced practitioner order or complex needs related to functional status, cognitive ability, or social support system  Outcome: Progressing

## 2024-06-09 NOTE — ASSESSMENT & PLAN NOTE
Now s/p self extubation  Able to nod appropriately to question, but majority of speech is incomprehensible at baseline  Serial neuro checks  CAM ICU  Delirium precautions

## 2024-06-09 NOTE — ASSESSMENT & PLAN NOTE
S/p 10 Fr right chest tube, discontinued on 6/8  Now with right pleural effusion  Aggressive, multi-modal pain control

## 2024-06-09 NOTE — QUICK NOTE
Spoke to patient's sister and patient's caretaker. Both reported they are not the patient's medical decision maker and recommended calling [facility]. [facility] was not open and phone line offered the ability to leave a message but no way to directly contact any staff member.

## 2024-06-10 ENCOUNTER — APPOINTMENT (INPATIENT)
Dept: RADIOLOGY | Facility: HOSPITAL | Age: 59
DRG: 208 | End: 2024-06-10
Payer: MEDICARE

## 2024-06-10 LAB
ANION GAP SERPL CALCULATED.3IONS-SCNC: 4 MMOL/L (ref 4–13)
BASOPHILS # BLD MANUAL: 0 THOUSAND/UL (ref 0–0.1)
BASOPHILS NFR MAR MANUAL: 0 % (ref 0–1)
BUN SERPL-MCNC: 27 MG/DL (ref 5–25)
CALCIUM SERPL-MCNC: 8.5 MG/DL (ref 8.4–10.2)
CHLORIDE SERPL-SCNC: 110 MMOL/L (ref 96–108)
CO2 SERPL-SCNC: 32 MMOL/L (ref 21–32)
CREAT SERPL-MCNC: 0.57 MG/DL (ref 0.6–1.3)
EOSINOPHIL # BLD MANUAL: 0.19 THOUSAND/UL (ref 0–0.4)
EOSINOPHIL NFR BLD MANUAL: 2 % (ref 0–6)
ERYTHROCYTE [DISTWIDTH] IN BLOOD BY AUTOMATED COUNT: 14.4 % (ref 11.6–15.1)
GFR SERPL CREATININE-BSD FRML MDRD: 113 ML/MIN/1.73SQ M
GLUCOSE SERPL-MCNC: 150 MG/DL (ref 65–140)
HCT VFR BLD AUTO: 24.4 % (ref 36.5–49.3)
HGB BLD-MCNC: 7.9 G/DL (ref 12–17)
LYMPHOCYTES # BLD AUTO: 3.14 THOUSAND/UL (ref 0.6–4.47)
LYMPHOCYTES # BLD AUTO: 33 % (ref 14–44)
MAGNESIUM SERPL-MCNC: 1.9 MG/DL (ref 1.9–2.7)
MCH RBC QN AUTO: 32 PG (ref 26.8–34.3)
MCHC RBC AUTO-ENTMCNC: 32.4 G/DL (ref 31.4–37.4)
MCV RBC AUTO: 99 FL (ref 82–98)
MONOCYTES # BLD AUTO: 1.14 THOUSAND/UL (ref 0–1.22)
MONOCYTES NFR BLD: 12 % (ref 4–12)
NEUTROPHILS # BLD MANUAL: 5.05 THOUSAND/UL (ref 1.85–7.62)
NEUTS BAND NFR BLD MANUAL: 3 % (ref 0–8)
NEUTS SEG NFR BLD AUTO: 50 % (ref 43–75)
PHOSPHATE SERPL-MCNC: 3.5 MG/DL (ref 2.7–4.5)
PLATELET # BLD AUTO: 228 THOUSANDS/UL (ref 149–390)
PLATELET BLD QL SMEAR: ADEQUATE
PMV BLD AUTO: 10.8 FL (ref 8.9–12.7)
POTASSIUM SERPL-SCNC: 4.9 MMOL/L (ref 3.5–5.3)
RBC # BLD AUTO: 2.47 MILLION/UL (ref 3.88–5.62)
SODIUM SERPL-SCNC: 146 MMOL/L (ref 135–147)
WBC # BLD AUTO: 9.52 THOUSAND/UL (ref 4.31–10.16)

## 2024-06-10 PROCEDURE — 92526 ORAL FUNCTION THERAPY: CPT

## 2024-06-10 PROCEDURE — 85007 BL SMEAR W/DIFF WBC COUNT: CPT | Performed by: NURSE PRACTITIONER

## 2024-06-10 PROCEDURE — 99233 SBSQ HOSP IP/OBS HIGH 50: CPT | Performed by: ANESTHESIOLOGY

## 2024-06-10 PROCEDURE — 80048 BASIC METABOLIC PNL TOTAL CA: CPT | Performed by: NURSE PRACTITIONER

## 2024-06-10 PROCEDURE — 85027 COMPLETE CBC AUTOMATED: CPT | Performed by: NURSE PRACTITIONER

## 2024-06-10 PROCEDURE — 94760 N-INVAS EAR/PLS OXIMETRY 1: CPT

## 2024-06-10 PROCEDURE — 83735 ASSAY OF MAGNESIUM: CPT | Performed by: NURSE PRACTITIONER

## 2024-06-10 PROCEDURE — 84100 ASSAY OF PHOSPHORUS: CPT | Performed by: NURSE PRACTITIONER

## 2024-06-10 PROCEDURE — 71045 X-RAY EXAM CHEST 1 VIEW: CPT

## 2024-06-10 RX ORDER — SODIUM PHOSPHATE,MONO-DIBASIC 19G-7G/118
1 ENEMA (ML) RECTAL ONCE
Status: COMPLETED | OUTPATIENT
Start: 2024-06-10 | End: 2024-06-10

## 2024-06-10 RX ORDER — LACTULOSE 10 G/15ML
20 SOLUTION ORAL 2 TIMES DAILY
Status: DISCONTINUED | OUTPATIENT
Start: 2024-06-10 | End: 2024-06-10

## 2024-06-10 RX ORDER — POLYETHYLENE GLYCOL 3350 17 G/17G
17 POWDER, FOR SOLUTION ORAL DAILY
Status: DISCONTINUED | OUTPATIENT
Start: 2024-06-10 | End: 2024-06-11

## 2024-06-10 RX ORDER — KETOROLAC TROMETHAMINE 30 MG/ML
15 INJECTION, SOLUTION INTRAMUSCULAR; INTRAVENOUS EVERY 6 HOURS PRN
Status: ACTIVE | OUTPATIENT
Start: 2024-06-11 | End: 2024-06-12

## 2024-06-10 RX ORDER — MAGNESIUM SULFATE HEPTAHYDRATE 40 MG/ML
2 INJECTION, SOLUTION INTRAVENOUS ONCE
Status: COMPLETED | OUTPATIENT
Start: 2024-06-10 | End: 2024-06-10

## 2024-06-10 RX ORDER — KETOROLAC TROMETHAMINE 30 MG/ML
15 INJECTION, SOLUTION INTRAMUSCULAR; INTRAVENOUS EVERY 6 HOURS SCHEDULED
Status: DISPENSED | OUTPATIENT
Start: 2024-06-10 | End: 2024-06-11

## 2024-06-10 RX ORDER — LACTULOSE 10 G/15ML
20 SOLUTION ORAL 3 TIMES DAILY
Status: DISCONTINUED | OUTPATIENT
Start: 2024-06-10 | End: 2024-06-11

## 2024-06-10 RX ORDER — METOCLOPRAMIDE HYDROCHLORIDE 5 MG/ML
10 INJECTION INTRAMUSCULAR; INTRAVENOUS ONCE
Status: DISCONTINUED | OUTPATIENT
Start: 2024-06-10 | End: 2024-06-10

## 2024-06-10 RX ADMIN — QUETIAPINE FUMARATE 150 MG: 100 TABLET ORAL at 17:04

## 2024-06-10 RX ADMIN — LORATADINE 10 MG: 10 TABLET ORAL at 08:44

## 2024-06-10 RX ADMIN — LEVETIRACETAM 1500 MG: 500 TABLET, FILM COATED ORAL at 08:44

## 2024-06-10 RX ADMIN — LEVOTHYROXINE SODIUM 150 MCG: 0.15 TABLET ORAL at 08:44

## 2024-06-10 RX ADMIN — GABAPENTIN 400 MG: 400 CAPSULE ORAL at 08:43

## 2024-06-10 RX ADMIN — KETOROLAC TROMETHAMINE 15 MG: 30 INJECTION, SOLUTION INTRAMUSCULAR; INTRAVENOUS at 11:03

## 2024-06-10 RX ADMIN — DIVALPROEX SODIUM 500 MG: 125 CAPSULE ORAL at 14:17

## 2024-06-10 RX ADMIN — LACTULOSE 20 G: 20 SOLUTION ORAL at 17:03

## 2024-06-10 RX ADMIN — MAGNESIUM SULFATE HEPTAHYDRATE 2 G: 40 INJECTION, SOLUTION INTRAVENOUS at 07:55

## 2024-06-10 RX ADMIN — PRAVASTATIN SODIUM 80 MG: 80 TABLET ORAL at 17:04

## 2024-06-10 RX ADMIN — SENNOSIDES AND DOCUSATE SODIUM 2 TABLET: 50; 8.6 TABLET ORAL at 17:04

## 2024-06-10 RX ADMIN — GABAPENTIN 400 MG: 400 CAPSULE ORAL at 17:04

## 2024-06-10 RX ADMIN — SODIUM PHOSPHATE, DIBASIC AND SODIUM PHOSPHATE, MONOBASIC 1 ENEMA: 7; 19 ENEMA RECTAL at 22:30

## 2024-06-10 RX ADMIN — POLYETHYLENE GLYCOL 3350 17 G: 17 POWDER, FOR SOLUTION ORAL at 08:43

## 2024-06-10 RX ADMIN — KETOROLAC TROMETHAMINE 15 MG: 30 INJECTION, SOLUTION INTRAMUSCULAR; INTRAVENOUS at 17:04

## 2024-06-10 RX ADMIN — CEFTRIAXONE SODIUM 1000 MG: 10 INJECTION, POWDER, FOR SOLUTION INTRAVENOUS at 11:04

## 2024-06-10 RX ADMIN — OXYCODONE HYDROCHLORIDE 10 MG: 10 TABLET ORAL at 04:48

## 2024-06-10 RX ADMIN — LACTULOSE 20 G: 20 SOLUTION ORAL at 08:43

## 2024-06-10 RX ADMIN — QUETIAPINE FUMARATE 150 MG: 100 TABLET ORAL at 08:44

## 2024-06-10 RX ADMIN — LACOSAMIDE 150 MG: 100 TABLET, FILM COATED ORAL at 08:43

## 2024-06-10 RX ADMIN — SODIUM PHOSPHATE, DIBASIC AND SODIUM PHOSPHATE, MONOBASIC 1 ENEMA: 7; 19 ENEMA RECTAL at 09:13

## 2024-06-10 RX ADMIN — DIVALPROEX SODIUM 500 MG: 125 CAPSULE ORAL at 01:58

## 2024-06-10 RX ADMIN — SENNOSIDES AND DOCUSATE SODIUM 2 TABLET: 50; 8.6 TABLET ORAL at 08:44

## 2024-06-10 RX ADMIN — LIDOCAINE 5% 2 PATCH: 700 PATCH TOPICAL at 08:09

## 2024-06-10 RX ADMIN — ACETAMINOPHEN 975 MG: 325 TABLET, FILM COATED ORAL at 14:16

## 2024-06-10 RX ADMIN — CHLORHEXIDINE GLUCONATE 0.12% ORAL RINSE 15 ML: 1.2 LIQUID ORAL at 22:34

## 2024-06-10 RX ADMIN — ACETAMINOPHEN 975 MG: 325 TABLET, FILM COATED ORAL at 05:38

## 2024-06-10 RX ADMIN — CHLORHEXIDINE GLUCONATE 0.12% ORAL RINSE 15 ML: 1.2 LIQUID ORAL at 08:09

## 2024-06-10 RX ADMIN — DIVALPROEX SODIUM 500 MG: 125 CAPSULE ORAL at 08:46

## 2024-06-10 NOTE — CASE MANAGEMENT
Case Management Discharge Planning Note    Patient name Jairon Oconnell  Location ICU 10/ICU 10 MRN [id number]  : 1965 Date 6/10/2024       Current Admission Date: 6/3/2024  Current Admission Diagnosis:Cardiac arrest (HCC)   Patient Active Problem List    Diagnosis Date Noted Date Diagnosed    Acute metabolic encephalopathy 2024     Cardiac arrest (HCC) 2024     Dysphagia 2024     Closed traumatic fracture of ribs of right side with pneumothorax 2024     Acute respiratory failure with hypoxia and hypercapnia ([facility]) 2024     Urinary retention 2024     Nondisplaced fracture of greater trochanter of right femur, subsequent encounter for closed fracture with routine healing 2024     Closed nondisplaced fracture of proximal phalanx of left index finger with routine healing, subsequent encounter 10/30/2023     Pressure ulcer of sacral region, stage 3 ([facility]) 2023     Ambulatory dysfunction 2022     Social problem 2021     Hypomagnesemia 2021     Thrombocytopathia ([facility]) 2020     Hyponatremia 2019     Metabolic encephalopathy 2019     Seborrheic dermatitis of scalp 2019     Nearsightedness 2019     Behavior concern in adult 2019     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     Vitamin B12 deficiency 2017     Mixed hyperlipidemia 2016     Vitamin D deficiency 2016     Type 2 diabetes mellitus with diabetic neuropathy, without long-term current use of insulin ([facility]) 06/15/2016     Acquired hypothyroidism 06/15/2016     Cataract 2013     Ataxic cerebral palsy (HCC) 2013     Generalized convulsive epilepsy ([facility]) 2013     Essential hypertension 2013     Osteoporosis 2013     Scoliosis 2013       LOS (days): 7  Geometric Mean LOS (GMLOS) (days): 5.2  Days to GMLOS:-1.6     OBJECTIVE:  Risk of Unplanned Readmission Score: 27.86         Current admission  status: Inpatient   Preferred Pharmacy:   [facility] [place] - [address]  [address]  [place] [address]  Phone: [phone] Fax: [phone]    [id number] [place] [facility] [address]  [facility] [place] [address]  Phone: [phone] Fax: [phone]    Primary Care Provider: HUNTER Brown    Primary Insurance: MEDICARE  Secondary Insurance: [place] [employer]    DISCHARGE DETAILS:                                          Other Referral/Resources/Interventions Provided:  Referral Comments: Pt had chest tube placed on 6/9.  MFNC at 6L, IV abx, IV MgSO4.  [facility] following for post d/c care; caregiver refusing to consider placement in [facility].  CM will continue to follow.         Treatment Team Recommendation: Short Term Rehab, SNF, Acute Rehab  Discharge Destination Plan:: Home with Home Health Care  Transport at Discharge : Family

## 2024-06-10 NOTE — RESPIRATORY THERAPY NOTE
06/09/24 2047   Respiratory Assessment   Assessment Type Assess only   General Appearance Awake   Respiratory Pattern Normal   Chest Assessment Chest expansion symmetrical   Bilateral Breath Sounds Diminished;Coarse   Resp Comments patient remains on MFNC titrated to 8L at this time   O2 Device MFNC   Oxygen Therapy/Pulse Ox   O2 Device Mid flow nasal cannula   O2 Therapy Oxygen humidified   O2 Flow Rate (L/min) 8 L/min   SpO2 Activity At Rest   $ Pulse Oximetry Spot Check Charge Completed

## 2024-06-10 NOTE — ASSESSMENT & PLAN NOTE
Thought to be secondary to hypoxia secondary to airway obstruction in the setting of chocking  Now post self extubation, purposeful movement  WIth residual AHRF, 2/2 rib fractures vs aspiration as below  ECHO- EF 55%

## 2024-06-10 NOTE — ASSESSMENT & PLAN NOTE
S/p self extubation 6/4  With residual AHRF in setting of rib fractures, likely aspiration  On MFNC, weaning down  Wean as able to maintain Spo2 >92%

## 2024-06-10 NOTE — PROGRESS NOTES
[facility]  Progress Note  Name: Jairon Oconnell I  MRN: [id number]  Unit/Bed#: ICU 10 I Date of Admission: 6/3/2024   Date of Service: 6/10/2024 I Hospital Day: 7    Assessment & Plan   * Cardiac arrest (HCC)  Assessment & Plan  Thought to be secondary to hypoxia secondary to airway obstruction in the setting of chocking  Now post self extubation, purposeful movement  WIth residual AHRF, 2/2 rib fractures vs aspiration as below  ECHO- EF 55%      Acute respiratory failure with hypoxia and hypercapnia (HCC)  Assessment & Plan  S/p self extubation 6/4  With residual AHRF in setting of rib fractures, likely aspiration  On MFNC, weaning down  Wean as able to maintain Spo2 >92%    Closed traumatic fracture of ribs of right side with pneumothorax  Assessment & Plan  S/p 10 Fr right chest tube, discontinued on 6/8  32 F chest tube placed 6/9 due to hemothorax and persistent small pneumothorax  Pulled back x2   Monitor output from chest tube    Dysphagia  Assessment & Plan  Aspiration precautions  Pills crushed in pudding      Acute metabolic encephalopathy  Assessment & Plan  Now s/p self extubation  Able to nod appropriately to question, but majority of speech is incomprehensible at baseline  Serial neuro checks  CAM ICU  Delirium precautions    Ambulatory dysfunction  Assessment & Plan  PT/OT when appropriate  Fall precautions    Behavior concern in adult  Assessment & Plan  Holding home serouqel  Consider restarting when O2 requirements improved     Acquired hypothyroidism  Assessment & Plan  Continue home levothyroxine    Generalized convulsive epilepsy (HCC)  Assessment & Plan  Continue home medications  Depakote restarted to avoid withdrawal seizures             Disposition: Stepdown Level 1    ICU Core Measures     A: Assess, Prevent, and Manage Pain Has pain been assessed? Yes  Need for changes to pain regimen? No   B: Both SAT/SAT  N/A   C: Choice of Sedation RASS Goal: N/A patient not on  sedation  Need for changes to sedation or analgesia regimen? NA   D: Delirium CAM-ICU: Unable to perform secondary to Dementia or other chronic cognitive dysfunction   E: Early Mobility  Plan for early mobility? Yes   F: Family Engagement Plan for family engagement today? Yes       Antibiotic Review: Awaiting culture results.     Review of Invasive Devices:            Prophylaxis:  VTE Contraindicated secondary to: hemothorax   Stress Ulcer  not ordered         Significant 24hr Events     24hr events: Large bore chest tube placed on right side due to hemothorax. Pulled back twice after insertion to ensure proper placement. No overnight events.      Subjective   Review of Systems: See HPI for Review of Systems     Objective                            Vitals I/O      Most Recent Min/Max in 24hrs   Temp 98 °F (36.7 °C) Temp  Min: 97.5 °F (36.4 °C)  Max: 98 °F (36.7 °C)   Pulse 91 Pulse  Min: 81  Max: 124   Resp 14 Resp  Min: 11  Max: 51   /57 BP  Min: 119/59  Max: 170/79   O2 Sat 100 % SpO2  Min: 72 %  Max: 100 %      Intake/Output Summary (Last 24 hours) at 6/10/2024 0504  Last data filed at 6/9/2024 1800  Gross per 24 hour   Intake --   Output 2050 ml   Net -2050 ml       Diet Dysphagia/Modified Consistency; Dysphagia 1-Pureed; Honey Thick Liquid; Honey Thick Liquid    Invasive Monitoring           Physical Exam   Physical Exam  Vitals reviewed.   Eyes:      Conjunctiva/sclera: Conjunctivae normal.   Skin:     General: Skin is warm and dry.   HENT:      Head: Normocephalic and atraumatic.      Mouth/Throat:      Mouth: Mucous membranes are moist.   Cardiovascular:      Rate and Rhythm: Normal rate and regular rhythm.      Pulses: Normal pulses.      Heart sounds: Normal heart sounds.   Musculoskeletal:      Right lower leg: No edema.      Left lower leg: No edema.   Abdominal:      Palpations: Abdomen is soft.   Constitutional:       Appearance: He is ill-appearing.   Pulmonary:      Effort: Tachypnea present.       Breath sounds: Normal breath sounds.   Neurological:      General: No focal deficit present.      Mental Status: He is alert. Mental status is at baseline.            Diagnostic Studies      EKG: Sinus rhythm on telemetry  Imaging:  I have personally reviewed pertinent reports.       Medications:  Scheduled PRN   acetaminophen, 975 mg, Q8H RONALD  cefTRIAXone, 1,000 mg, Q24H  chlorhexidine, 15 mL, Q12H RONALD  divalproex sodium, 500 mg, Q8H RONALD  gabapentin, 400 mg, TID  lacosamide, 150 mg, QAM  lacosamide, 200 mg, HS  levETIRAcetam, 1,500 mg, Q12H RONALD  levothyroxine, 150 mcg, Daily  lidocaine, 2 patch, Daily  loratadine, 10 mg, Daily  pravastatin, 80 mg, Daily With Dinner  QUEtiapine, 150 mg, BID  QUEtiapine, 400 mg, HS  senna-docusate sodium, 2 tablet, BID  zonisamide, 300 mg, HS      ondansetron, 4 mg, Q6H PRN  oxyCODONE, 5 mg, Q6H PRN   Or  oxyCODONE, 10 mg, Q6H PRN       Continuous          Labs:    CBC    Recent Labs     06/09/24  0530 06/09/24  1126 06/09/24  1949   WBC 9.36 9.09  --    HGB 8.0* 7.8* 8.6*   HCT 24.0* 23.4* 26.0*    195  --      BMP    Recent Labs     06/09/24  0530   SODIUM 144   K 4.2   *   CO2 29   AGAP 6   BUN 35*   CREATININE 0.80   CALCIUM 8.5       Coags    No recent results     Additional Electrolytes  Recent Labs     06/09/24  0530   MG 2.4   PHOS 4.6*          Blood Gas    No recent results  No recent results LFTs  No recent results    Infectious  No recent results    Glucose  Recent Labs     06/09/24  0530   GLUC 154*               HUNTER Pascual

## 2024-06-10 NOTE — PLAN OF CARE
Plan/Recommendations:  Continue on puree/honey thick liquids.  Meds crushed  1:1 full feeding assist  Alternate solids and liquids  HTL by teaspoon amounts only  Upright 90' for all meals  ST follow-up x1-3

## 2024-06-10 NOTE — PLAN OF CARE
Problem: PAIN - ADULT  Goal: Verbalizes/displays adequate comfort level or baseline comfort level  Description: Interventions:  - Encourage patient to monitor pain and request assistance  - Assess pain using appropriate pain scale  - Administer analgesics based on type and severity of pain and evaluate response  - Implement non-pharmacological measures as appropriate and evaluate response  - Consider cultural and social influences on pain and pain management  - Notify physician/advanced practitioner if interventions unsuccessful or patient reports new pain  Outcome: Progressing     Problem: INFECTION - ADULT  Goal: Absence or prevention of progression during hospitalization  Description: INTERVENTIONS:  - Assess and monitor for signs and symptoms of infection  - Monitor lab/diagnostic results  - Monitor all insertion sites, i.e. indwelling lines, tubes, and drains  - Monitor endotracheal if appropriate and nasal secretions for changes in amount and color  - [place] appropriate cooling/warming therapies per order  - Administer medications as ordered  - Instruct and encourage patient and family to use good hand hygiene technique  - Identify and instruct in appropriate isolation precautions for identified infection/condition  Outcome: Progressing     Problem: SAFETY ADULT  Goal: Patient will remain free of falls  Description: INTERVENTIONS:  - Educate patient/family on patient safety including physical limitations  - Instruct patient to call for assistance with activity   - Consult OT/PT to assist with strengthening/mobility   - Keep Call bell within reach  - Keep bed low and locked with side rails adjusted as appropriate  - Keep care items and personal belongings within reach  - Initiate and maintain comfort rounds  - Make Fall Risk Sign visible to staff  - Offer Toileting every 2 Hours, in advance of need  - Initiate/Maintain bed alarm  - Apply yellow socks and bracelet for high fall risk patients  - Consider  moving patient to room near nurses station  Outcome: Progressing  Goal: Maintain or return to baseline ADL function  Description: INTERVENTIONS:  -  Assess patient's ability to carry out ADLs; assess patient's baseline for ADL function and identify physical deficits which impact ability to perform ADLs (bathing, care of mouth/teeth, toileting, grooming, dressing, etc.)  - Assess/evaluate cause of self-care deficits   - Assess range of motion  - Assess patient's mobility; develop plan if impaired  - Assess patient's need for assistive devices and provide as appropriate  - Encourage maximum independence but intervene and supervise when necessary  - Involve family in performance of ADLs  - Assess for home care needs following discharge   - Consider OT consult to assist with ADL evaluation and planning for discharge  - Provide patient education as appropriate  Outcome: Progressing  Goal: Maintains/Returns to pre admission functional level  Description: INTERVENTIONS:  - Perform AM-PAC 6 Click Basic Mobility/ Daily Activity assessment daily.  - Set and communicate daily mobility goal to care team and patient/family/caregiver.   - Collaborate with rehabilitation services on mobility goals if consulted  - Reposition patient every 2 hours.  - Dangle patient 1 times a day  - Stand patient 1 times a day  - Out of bed to chair 1 times a day   - Out of bed for meals 1 times a day  - Out of bed for toileting  - Record patient progress and toleration of activity level   Outcome: Progressing     Problem: DISCHARGE PLANNING  Goal: Discharge to home or other facility with appropriate resources  Description: INTERVENTIONS:  - Identify barriers to discharge w/patient and caregiver  - Arrange for needed discharge resources and transportation as appropriate  - Identify discharge learning needs (meds, wound care, etc.)  - Arrange for interpretive services to assist at discharge as needed  - Refer to Case Management Department for  coordinating discharge planning if the patient needs post-hospital services based on physician/advanced practitioner order or complex needs related to functional status, cognitive ability, or social support system  Outcome: Progressing     Problem: Knowledge Deficit  Goal: Patient/family/caregiver demonstrates understanding of disease process, treatment plan, medications, and discharge instructions  Description: Complete learning assessment and assess knowledge base.  Interventions:  - Provide teaching at level of understanding  - Provide teaching via preferred learning methods  Outcome: Progressing     Problem: Nutrition/Hydration-ADULT  Goal: Nutrient/Hydration intake appropriate for improving, restoring or maintaining nutritional needs  Description: Monitor and assess patient's nutrition/hydration status for malnutrition. Collaborate with interdisciplinary team and initiate plan and interventions as ordered.  Monitor patient's weight and dietary intake as ordered or per policy. Utilize nutrition screening tool and intervene as necessary. Determine patient's food preferences and provide high-protein, high-caloric foods as appropriate.     INTERVENTIONS:  - Monitor oral intake, urinary output, labs, and treatment plans  - Assess nutrition and hydration status and recommend course of action  - Evaluate amount of meals eaten  - Assist patient with eating if necessary   - Allow adequate time for meals  - Recommend/ encourage appropriate diets, oral nutritional supplements, and vitamin/mineral supplements  - Order, calculate, and assess calorie counts as needed  - Recommend, monitor, and adjust tube feedings and TPN/PPN based on assessed needs  - Assess need for intravenous fluids  - Provide specific nutrition/hydration education as appropriate  - Include patient/family/caregiver in decisions related to nutrition  Outcome: Progressing     Problem: Prexisting or High Potential for Compromised Skin Integrity  Goal: Skin  integrity is maintained or improved  Description: INTERVENTIONS:  - Identify patients at risk for skin breakdown  - Assess and monitor skin integrity  - Assess and monitor nutrition and hydration status  - Monitor labs   - Assess for incontinence   - Turn and reposition patient  - Assist with mobility/ambulation  - Relieve pressure over bony prominences  - Avoid friction and shearing  - Provide appropriate hygiene as needed including keeping skin clean and dry  - Evaluate need for skin moisturizer/barrier cream  - Collaborate with interdisciplinary team   - Patient/family teaching  - Consider wound care consult   Outcome: Progressing

## 2024-06-10 NOTE — SPEECH THERAPY NOTE
Speech Language/Pathology    Speech/Language Pathology Progress Note    Patient Name: Jairon Oconnell  Today's Date: 6/10/2024     Problem List  Principal Problem:    Cardiac arrest (HCC)  Active Problems:    Generalized convulsive epilepsy (HCC)    Acquired hypothyroidism    Behavior concern in adult    Ambulatory dysfunction    Acute metabolic encephalopathy    Dysphagia    Closed traumatic fracture of ribs of right side with pneumothorax    Acute respiratory failure with hypoxia and hypercapnia ([facility])       Past Medical History  Past Medical History:   Diagnosis Date    ADRIANA (acute kidney injury) ([facility]) 9/24/2019    Bacteremia due to Gram-positive bacteria 9/7/2021    Buttock wound, left, subsequent encounter 11/5/2021    COVID-19     CP (cerebral palsy) ([facility])     Depression     Diabetes ([facility])     Disease of thyroid gland     Gait disorder     Gluteal abscess 9/6/2021    Hyperlipidemia     Hypertension     Kidney failure     Kidney stones     Moderate protein-calorie malnutrition ([facility]) 12/22/2021    Osteoporosis     Pressure injury of left buttock, stage 4 ([facility]) 3/9/2022    Psychiatric disorder     Scoliosis     Seizures ([facility])     Sepsis ([facility]) 9/25/2019    Thyroid disease     UTI (urinary tract infection)         Past Surgical History  Past Surgical History:   Procedure Laterality Date    APPENDECTOMY      IR PICC PLACEMENT SINGLE LUMEN  9/13/2021    IR PICC PLACEMENT SINGLE LUMEN  9/16/2021         Subjective:  Pt received asleep in bed. Used moderate tactile and verbal cues to wake pt for breakfast. Positioned bed at 90' and provided oral care prior to administering breakfast meal. Pt's BANDAR fluctuated throughout session. Nurse reported alertness may fluctuate due to current medications.    Objective:  The following consistencies were administered today: puree and HTL. Foods included pureed eggs, waffles, apple sauce and thickened apple juice and coffee. Administered clinician controlled tsp amounts of puree-  bolus retrieval and containment were adequate. Oral mastication was prolonged but functional. A-P transfer noted to be quicker but functional for pt. Swallow initiation appeared delayed. Administered HTL by tsp amounts- swallow initiation appeared delayed. No overt s/s of aspiration or changes in respiratory status noted. O2 saturation remains 95% on 5L NC     Nurse administered meds crushed in puree- no overt s/s of aspiration or changes in respiratory status noted. Noted coughx1 after nurse administered HTL w/meds. No change in respiratory status or changes in vocal quality noted.    Assessment:  Pt continues to present with oropharyngeal dysphagia- pt appears to be managing well on current diet and is the least restrictive given his PMH and current medical status.     Plan/Recommendations:  Continue on puree/honey thick liquids.  Meds crushed  1:1 full feeding assist  Alternate solids and liquids  HTL by teaspoon amounts only  Upright 90' for all meals  ST follow-up x1-3

## 2024-06-10 NOTE — RESPIRATORY THERAPY NOTE
06/10/24 0059   Respiratory Assessment   Resp Comments titrated liter flow to 6L   Oxygen Therapy/Pulse Ox   O2 Device Mid flow nasal cannula   O2 Therapy Oxygen humidified   Nasal Cannula O2 Flow Rate (L/min) (S)  8 L/min   Calculated FIO2 (%) - Nasal Cannula 52   SpO2 Activity At Rest   $ Pulse Oximetry Spot Check Charge Completed

## 2024-06-10 NOTE — ASSESSMENT & PLAN NOTE
S/p 10 Fr right chest tube, discontinued on 6/8  34 F chest tube placed 6/9 due to hemothorax and persistent small pneumothorax  Pulled back x2   Monitor output from chest tube

## 2024-06-11 ENCOUNTER — APPOINTMENT (INPATIENT)
Dept: CT IMAGING | Facility: HOSPITAL | Age: 59
DRG: 208 | End: 2024-06-11
Payer: MEDICARE

## 2024-06-11 ENCOUNTER — APPOINTMENT (INPATIENT)
Dept: RADIOLOGY | Facility: HOSPITAL | Age: 59
DRG: 208 | End: 2024-06-11
Payer: MEDICARE

## 2024-06-11 LAB
ALBUMIN SERPL BCG-MCNC: 3.1 G/DL (ref 3.5–5)
ALP SERPL-CCNC: 74 U/L (ref 34–104)
ALT SERPL W P-5'-P-CCNC: 42 U/L (ref 7–52)
ANION GAP SERPL CALCULATED.3IONS-SCNC: 4 MMOL/L (ref 4–13)
AST SERPL W P-5'-P-CCNC: 28 U/L (ref 13–39)
BACTERIA BLD CULT: NORMAL
BACTERIA BLD CULT: NORMAL
BILIRUB SERPL-MCNC: 0.34 MG/DL (ref 0.2–1)
BUN SERPL-MCNC: 28 MG/DL (ref 5–25)
CALCIUM ALBUM COR SERPL-MCNC: 9.6 MG/DL (ref 8.3–10.1)
CALCIUM SERPL-MCNC: 8.9 MG/DL (ref 8.4–10.2)
CHLORIDE SERPL-SCNC: 114 MMOL/L (ref 96–108)
CO2 SERPL-SCNC: 34 MMOL/L (ref 21–32)
CREAT SERPL-MCNC: 0.57 MG/DL (ref 0.6–1.3)
ERYTHROCYTE [DISTWIDTH] IN BLOOD BY AUTOMATED COUNT: 14.4 % (ref 11.6–15.1)
GFR SERPL CREATININE-BSD FRML MDRD: 113 ML/MIN/1.73SQ M
GLUCOSE SERPL-MCNC: 153 MG/DL (ref 65–140)
HCT VFR BLD AUTO: 23.9 % (ref 36.5–49.3)
HGB BLD-MCNC: 7.7 G/DL (ref 12–17)
MAGNESIUM SERPL-MCNC: 2 MG/DL (ref 1.9–2.7)
MCH RBC QN AUTO: 32.4 PG (ref 26.8–34.3)
MCHC RBC AUTO-ENTMCNC: 32.2 G/DL (ref 31.4–37.4)
MCV RBC AUTO: 100 FL (ref 82–98)
PHOSPHATE SERPL-MCNC: 4.1 MG/DL (ref 2.7–4.5)
PLATELET # BLD AUTO: 258 THOUSANDS/UL (ref 149–390)
PMV BLD AUTO: 10.1 FL (ref 8.9–12.7)
POTASSIUM SERPL-SCNC: 4 MMOL/L (ref 3.5–5.3)
PROCALCITONIN SERPL-MCNC: 0.26 NG/ML
PROT SERPL-MCNC: 6 G/DL (ref 6.4–8.4)
RBC # BLD AUTO: 2.38 MILLION/UL (ref 3.88–5.62)
SODIUM SERPL-SCNC: 152 MMOL/L (ref 135–147)
WBC # BLD AUTO: 9.19 THOUSAND/UL (ref 4.31–10.16)

## 2024-06-11 PROCEDURE — 71045 X-RAY EXAM CHEST 1 VIEW: CPT

## 2024-06-11 PROCEDURE — 99233 SBSQ HOSP IP/OBS HIGH 50: CPT | Performed by: ANESTHESIOLOGY

## 2024-06-11 PROCEDURE — 85027 COMPLETE CBC AUTOMATED: CPT | Performed by: NURSE PRACTITIONER

## 2024-06-11 PROCEDURE — 80053 COMPREHEN METABOLIC PANEL: CPT | Performed by: NURSE PRACTITIONER

## 2024-06-11 PROCEDURE — 71250 CT THORAX DX C-: CPT

## 2024-06-11 PROCEDURE — 84100 ASSAY OF PHOSPHORUS: CPT | Performed by: NURSE PRACTITIONER

## 2024-06-11 PROCEDURE — 84145 PROCALCITONIN (PCT): CPT | Performed by: NURSE PRACTITIONER

## 2024-06-11 PROCEDURE — 83735 ASSAY OF MAGNESIUM: CPT | Performed by: NURSE PRACTITIONER

## 2024-06-11 RX ORDER — OXYCODONE HYDROCHLORIDE 5 MG/1
5 TABLET ORAL EVERY 6 HOURS PRN
Status: DISCONTINUED | OUTPATIENT
Start: 2024-06-11 | End: 2024-06-21 | Stop reason: HOSPADM

## 2024-06-11 RX ORDER — ENOXAPARIN SODIUM 100 MG/ML
40 INJECTION SUBCUTANEOUS
Status: DISCONTINUED | OUTPATIENT
Start: 2024-06-11 | End: 2024-06-21 | Stop reason: HOSPADM

## 2024-06-11 RX ORDER — DEXTROSE MONOHYDRATE 50 MG/ML
100 INJECTION, SOLUTION INTRAVENOUS CONTINUOUS
Status: DISPENSED | OUTPATIENT
Start: 2024-06-11 | End: 2024-06-11

## 2024-06-11 RX ORDER — POLYETHYLENE GLYCOL 3350 17 G/17G
17 POWDER, FOR SOLUTION ORAL DAILY PRN
Status: DISCONTINUED | OUTPATIENT
Start: 2024-06-11 | End: 2024-06-12

## 2024-06-11 RX ADMIN — CHLORHEXIDINE GLUCONATE 0.12% ORAL RINSE 15 ML: 1.2 LIQUID ORAL at 08:35

## 2024-06-11 RX ADMIN — LEVETIRACETAM 1500 MG: 500 TABLET, FILM COATED ORAL at 21:34

## 2024-06-11 RX ADMIN — GABAPENTIN 400 MG: 400 CAPSULE ORAL at 17:49

## 2024-06-11 RX ADMIN — GABAPENTIN 400 MG: 400 CAPSULE ORAL at 21:34

## 2024-06-11 RX ADMIN — KETOROLAC TROMETHAMINE 15 MG: 30 INJECTION, SOLUTION INTRAMUSCULAR; INTRAVENOUS at 05:01

## 2024-06-11 RX ADMIN — PRAVASTATIN SODIUM 80 MG: 80 TABLET ORAL at 17:49

## 2024-06-11 RX ADMIN — DIVALPROEX SODIUM 500 MG: 125 CAPSULE ORAL at 05:02

## 2024-06-11 RX ADMIN — CEFTRIAXONE SODIUM 1000 MG: 10 INJECTION, POWDER, FOR SOLUTION INTRAVENOUS at 12:00

## 2024-06-11 RX ADMIN — ACETAMINOPHEN 975 MG: 325 TABLET, FILM COATED ORAL at 05:02

## 2024-06-11 RX ADMIN — QUETIAPINE FUMARATE 150 MG: 100 TABLET ORAL at 17:49

## 2024-06-11 RX ADMIN — ACETAMINOPHEN 975 MG: 325 TABLET, FILM COATED ORAL at 14:41

## 2024-06-11 RX ADMIN — LACOSAMIDE 150 MG: 100 TABLET, FILM COATED ORAL at 08:35

## 2024-06-11 RX ADMIN — LORATADINE 10 MG: 10 TABLET ORAL at 08:36

## 2024-06-11 RX ADMIN — LACOSAMIDE 200 MG: 100 TABLET, FILM COATED ORAL at 21:33

## 2024-06-11 RX ADMIN — QUETIAPINE FUMARATE 150 MG: 100 TABLET ORAL at 08:36

## 2024-06-11 RX ADMIN — LIDOCAINE 5% 2 PATCH: 700 PATCH TOPICAL at 08:35

## 2024-06-11 RX ADMIN — CHLORHEXIDINE GLUCONATE 0.12% ORAL RINSE 15 ML: 1.2 LIQUID ORAL at 21:33

## 2024-06-11 RX ADMIN — LEVOTHYROXINE SODIUM 150 MCG: 0.15 TABLET ORAL at 08:36

## 2024-06-11 RX ADMIN — ACETAMINOPHEN 975 MG: 325 TABLET, FILM COATED ORAL at 21:34

## 2024-06-11 RX ADMIN — ENOXAPARIN SODIUM 40 MG: 40 INJECTION SUBCUTANEOUS at 10:47

## 2024-06-11 RX ADMIN — ZONISAMIDE 300 MG: 100 CAPSULE ORAL at 21:34

## 2024-06-11 RX ADMIN — DEXTROSE 100 ML/HR: 5 SOLUTION INTRAVENOUS at 06:34

## 2024-06-11 RX ADMIN — GABAPENTIN 400 MG: 400 CAPSULE ORAL at 08:36

## 2024-06-11 RX ADMIN — LEVETIRACETAM 1500 MG: 500 TABLET, FILM COATED ORAL at 08:37

## 2024-06-11 RX ADMIN — QUETIAPINE FUMARATE 400 MG: 100 TABLET ORAL at 21:33

## 2024-06-11 RX ADMIN — DIVALPROEX SODIUM 500 MG: 125 CAPSULE ORAL at 21:33

## 2024-06-11 RX ADMIN — SENNOSIDES AND DOCUSATE SODIUM 2 TABLET: 50; 8.6 TABLET ORAL at 08:36

## 2024-06-11 RX ADMIN — DIVALPROEX SODIUM 500 MG: 125 CAPSULE ORAL at 14:42

## 2024-06-11 NOTE — ASSESSMENT & PLAN NOTE
S/p self extubation 6/4  With residual AHRF in setting of rib fractures, likely aspiration  Now on NC oxygen  Wean as able to maintain Spo2 >92%

## 2024-06-11 NOTE — PLAN OF CARE
Problem: PAIN - ADULT  Goal: Verbalizes/displays adequate comfort level or baseline comfort level  Description: Interventions:  - Encourage patient to monitor pain and request assistance  - Assess pain using appropriate pain scale  - Administer analgesics based on type and severity of pain and evaluate response  - Implement non-pharmacological measures as appropriate and evaluate response  - Consider cultural and social influences on pain and pain management  - Notify physician/advanced practitioner if interventions unsuccessful or patient reports new pain  Outcome: Progressing     Problem: INFECTION - ADULT  Goal: Absence or prevention of progression during hospitalization  Description: INTERVENTIONS:  - Assess and monitor for signs and symptoms of infection  - Monitor lab/diagnostic results  - Monitor all insertion sites, i.e. indwelling lines, tubes, and drains  - Monitor endotracheal if appropriate and nasal secretions for changes in amount and color  - [place] appropriate cooling/warming therapies per order  - Administer medications as ordered  - Instruct and encourage patient and family to use good hand hygiene technique  - Identify and instruct in appropriate isolation precautions for identified infection/condition  Outcome: Progressing     Problem: SAFETY ADULT  Goal: Patient will remain free of falls  Description: INTERVENTIONS:  - Educate patient/family on patient safety including physical limitations  - Instruct patient to call for assistance with activity   - Consult OT/PT to assist with strengthening/mobility   - Keep Call bell within reach  - Keep bed low and locked with side rails adjusted as appropriate  - Keep care items and personal belongings within reach  - Initiate and maintain comfort rounds  - Make Fall Risk Sign visible to staff  - Offer Toileting every 2 Hours, in advance of need  - Initiate/Maintain bed alarm  - Apply yellow socks and bracelet for high fall risk patients  - Consider  moving patient to room near nurses station  Outcome: Progressing  Goal: Maintain or return to baseline ADL function  Description: INTERVENTIONS:  -  Assess patient's ability to carry out ADLs; assess patient's baseline for ADL function and identify physical deficits which impact ability to perform ADLs (bathing, care of mouth/teeth, toileting, grooming, dressing, etc.)  - Assess/evaluate cause of self-care deficits   - Assess range of motion  - Assess patient's mobility; develop plan if impaired  - Assess patient's need for assistive devices and provide as appropriate  - Encourage maximum independence but intervene and supervise when necessary  - Involve family in performance of ADLs  - Assess for home care needs following discharge   - Consider OT consult to assist with ADL evaluation and planning for discharge  - Provide patient education as appropriate  Outcome: Progressing  Goal: Maintains/Returns to pre admission functional level  Description: INTERVENTIONS:  - Perform AM-PAC 6 Click Basic Mobility/ Daily Activity assessment daily.  - Set and communicate daily mobility goal to care team and patient/family/caregiver.   - Collaborate with rehabilitation services on mobility goals if consulted  - Reposition patient every 2 hours.  - Dangle patient 1 times a day  - Stand patient 1 times a day  - Out of bed to chair 1 times a day   - Out of bed for meals 1 times a day  - Out of bed for toileting  - Record patient progress and toleration of activity level   Outcome: Progressing     Problem: DISCHARGE PLANNING  Goal: Discharge to home or other facility with appropriate resources  Description: INTERVENTIONS:  - Identify barriers to discharge w/patient and caregiver  - Arrange for needed discharge resources and transportation as appropriate  - Identify discharge learning needs (meds, wound care, etc.)  - Arrange for interpretive services to assist at discharge as needed  - Refer to Case Management Department for  coordinating discharge planning if the patient needs post-hospital services based on physician/advanced practitioner order or complex needs related to functional status, cognitive ability, or social support system  Outcome: Progressing     Problem: Knowledge Deficit  Goal: Patient/family/caregiver demonstrates understanding of disease process, treatment plan, medications, and discharge instructions  Description: Complete learning assessment and assess knowledge base.  Interventions:  - Provide teaching at level of understanding  - Provide teaching via preferred learning methods  Outcome: Progressing     Problem: Nutrition/Hydration-ADULT  Goal: Nutrient/Hydration intake appropriate for improving, restoring or maintaining nutritional needs  Description: Monitor and assess patient's nutrition/hydration status for malnutrition. Collaborate with interdisciplinary team and initiate plan and interventions as ordered.  Monitor patient's weight and dietary intake as ordered or per policy. Utilize nutrition screening tool and intervene as necessary. Determine patient's food preferences and provide high-protein, high-caloric foods as appropriate.     INTERVENTIONS:  - Monitor oral intake, urinary output, labs, and treatment plans  - Assess nutrition and hydration status and recommend course of action  - Evaluate amount of meals eaten  - Assist patient with eating if necessary   - Allow adequate time for meals  - Recommend/ encourage appropriate diets, oral nutritional supplements, and vitamin/mineral supplements  - Order, calculate, and assess calorie counts as needed  - Recommend, monitor, and adjust tube feedings and TPN/PPN based on assessed needs  - Assess need for intravenous fluids  - Provide specific nutrition/hydration education as appropriate  - Include patient/family/caregiver in decisions related to nutrition  Outcome: Progressing     Problem: Prexisting or High Potential for Compromised Skin Integrity  Goal: Skin  integrity is maintained or improved  Description: INTERVENTIONS:  - Identify patients at risk for skin breakdown  - Assess and monitor skin integrity  - Assess and monitor nutrition and hydration status  - Monitor labs   - Assess for incontinence   - Turn and reposition patient  - Assist with mobility/ambulation  - Relieve pressure over bony prominences  - Avoid friction and shearing  - Provide appropriate hygiene as needed including keeping skin clean and dry  - Evaluate need for skin moisturizer/barrier cream  - Collaborate with interdisciplinary team   - Patient/family teaching  - Consider wound care consult   Outcome: Progressing

## 2024-06-11 NOTE — ASSESSMENT & PLAN NOTE
Now s/p self extubation  Appears to be at baseline mental status  Serial neuro checks  [facility] ICU  Delirium precautions

## 2024-06-11 NOTE — CASE MANAGEMENT
Case Management Discharge Planning Note    Patient name Jairon Oconnell  Location ICU 10/ICU 10 MRN [id number]  : 1965 Date 2024       Current Admission Date: 6/3/2024  Current Admission Diagnosis:Cardiac arrest (HCC)   Patient Active Problem List    Diagnosis Date Noted Date Diagnosed    Acute metabolic encephalopathy 2024     Cardiac arrest (HCC) 2024     Dysphagia 2024     Closed traumatic fracture of ribs of right side with pneumothorax 2024     Acute respiratory failure with hypoxia and hypercapnia ([facility]) 2024     Urinary retention 2024     Nondisplaced fracture of greater trochanter of right femur, subsequent encounter for closed fracture with routine healing 2024     Closed nondisplaced fracture of proximal phalanx of left index finger with routine healing, subsequent encounter 10/30/2023     Pressure ulcer of sacral region, stage 3 ([facility]) 2023     Ambulatory dysfunction 2022     Social problem 2021     Hypomagnesemia 2021     Thrombocytopathia ([facility]) 2020     Hyponatremia 2019     Metabolic encephalopathy 2019     Seborrheic dermatitis of scalp 2019     Nearsightedness 2019     Behavior concern in adult 2019     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     Vitamin B12 deficiency 2017     Mixed hyperlipidemia 2016     Vitamin D deficiency 2016     Type 2 diabetes mellitus with diabetic neuropathy, without long-term current use of insulin ([facility]) 06/15/2016     Acquired hypothyroidism 06/15/2016     Cataract 2013     Ataxic cerebral palsy (HCC) 2013     Generalized convulsive epilepsy ([facility]) 2013     Essential hypertension 2013     Osteoporosis 2013     Scoliosis 2013       LOS (days): 8  Geometric Mean LOS (GMLOS) (days): 5.2  Days to GMLOS:-2.5     OBJECTIVE:  Risk of Unplanned Readmission Score: 29.78         Current admission  status: Inpatient   Preferred Pharmacy:   [facility] [place] - [address]  [address]  [place] [address]  Phone: [phone] Fax: [phone]    [id number] [place] - [address]  [facility] [place] [address]  Phone: [phone] Fax: [phone]    Primary Care Provider: HUNTER Brown    Primary Insurance: MEDICARE  Secondary Insurance: [place] [employer]    DISCHARGE DETAILS:                                          Other Referral/Resources/Interventions Provided:  Referral Comments: Pt continues w R chest tube;  MFNC at 6L.  IV abx, IV toradol, IVF at 100.  D/C date TBD;  plan is for home [facility].         Treatment Team Recommendation: Short Term Rehab, SNF, Home with Home Health Care  Discharge Destination Plan:: Home with Home Health Care  Transport at Discharge : Family

## 2024-06-11 NOTE — ASSESSMENT & PLAN NOTE
S/p 10 Fr right chest tube, discontinued on 6/8  34 F chest tube placed 6/9 due to hemothorax and persistent small pneumothorax  CXR still showing small apical pneumo  Monitor output from chest tube

## 2024-06-11 NOTE — PROGRESS NOTES
[facility]  Progress Note  Name: Jairon Oconnell I  MRN: [id number]  Unit/Bed#: ICU 10 I Date of Admission: 6/3/2024   Date of Service: 6/11/2024 I Hospital Day: 8    Assessment & Plan   * Cardiac arrest (HCC)  Assessment & Plan  Thought to be secondary to hypoxia secondary to airway obstruction in the setting of chocking  Now post self extubation and at baseline mental status  WIth residual AHRF, 2/2 rib fractures vs aspiration as below  ECHO- EF 55%      Acute respiratory failure with hypoxia and hypercapnia (HCC)  Assessment & Plan  S/p self extubation 6/4  With residual AHRF in setting of rib fractures, likely aspiration  Now on NC oxygen  Wean as able to maintain Spo2 >92%    Closed traumatic fracture of ribs of right side with pneumothorax  Assessment & Plan  S/p 10 Fr right chest tube, discontinued on 6/8  32 F chest tube placed 6/9 due to hemothorax and persistent small pneumothorax  CXR still showing small apical pneumo  Monitor output from chest tube    Dysphagia  Assessment & Plan  Aspiration precautions  Pills crushed in pudding      Acute metabolic encephalopathy  Assessment & Plan  Now s/p self extubation  Appears to be at baseline mental status  Serial neuro checks  CAM ICU  Delirium precautions    Ambulatory dysfunction  Assessment & Plan  PT/OT when appropriate  Fall precautions    Behavior concern in adult  Assessment & Plan  Home seroquel restarted    Acquired hypothyroidism  Assessment & Plan  Continue home levothyroxine    Generalized convulsive epilepsy (HCC)  Assessment & Plan  Continue home medications  Depakote restarted to avoid withdrawal seizures             Disposition: Stepdown Level 1    ICU Core Measures     A: Assess, Prevent, and Manage Pain Has pain been assessed? Yes  Need for changes to pain regimen? No   B: Both SAT/SAT  N/A   C: Choice of Sedation RASS Goal: N/A patient not on sedation  Need for changes to sedation or analgesia regimen? NA   D: Delirium  CAM-ICU: Unable to perform secondary to Dementia or other chronic cognitive dysfunction   E: Early Mobility  Plan for early mobility? Yes   F: Family Engagement Plan for family engagement today? Yes       Antibiotic Review: Awaiting culture results.     Review of Invasive Devices:            Prophylaxis:  VTE Contraindicated secondary to: hemothorax   Stress Ulcer  not ordered         Significant 24hr Events     24hr events: Patient was weaned off MFNC and is now on NC oxygen. No overnight events.      Subjective   Review of Systems: Review of Systems   Unable to perform ROS: Other        Objective                            Vitals I/O      Most Recent Min/Max in 24hrs   Temp 98.1 °F (36.7 °C) Temp  Min: 97.5 °F (36.4 °C)  Max: 98.6 °F (37 °C)   Pulse 88 Pulse  Min: 86  Max: 106   Resp 14 Resp  Min: 12  Max: 22   BP (!) 173/72 BP  Min: 128/59  Max: 198/79   O2 Sat 98 % SpO2  Min: 95 %  Max: 100 %      Intake/Output Summary (Last 24 hours) at 6/11/2024 0531  Last data filed at 6/10/2024 1800  Gross per 24 hour   Intake 338 ml   Output 1480 ml   Net -1142 ml       Diet Dysphagia/Modified Consistency; Dysphagia 1-Pureed; Honey Thick Liquid; Honey Thick Liquid    Invasive Monitoring           Physical Exam   Physical Exam  Eyes:      Conjunctiva/sclera: Conjunctivae normal.   Skin:     General: Skin is warm and dry.   HENT:      Head: Normocephalic and atraumatic.   Cardiovascular:      Rate and Rhythm: Normal rate and regular rhythm.      Pulses: Normal pulses.      Heart sounds: Normal heart sounds.   Abdominal:      Palpations: Abdomen is soft.   Constitutional:       Appearance: He is ill-appearing.   Pulmonary:      Effort: Pulmonary effort is normal.      Comments: Coarse B/L  Chest:      Comments: Right sided chest tube  Neurological:      General: No focal deficit present.      Mental Status: He is alert. Mental status is at baseline.            Diagnostic Studies      EKG: Sinus rhythm  Imaging:  I have  personally reviewed pertinent reports.       Medications:  Scheduled PRN   acetaminophen, 975 mg, Q8H RONALD  cefTRIAXone, 1,000 mg, Q24H  chlorhexidine, 15 mL, Q12H RONALD  divalproex sodium, 500 mg, Q8H RONALD  gabapentin, 400 mg, TID  ketorolac, 15 mg, Q6H RONALD  lacosamide, 150 mg, QAM  lacosamide, 200 mg, HS  lactulose, 20 g, TID  levETIRAcetam, 1,500 mg, Q12H RONALD  levothyroxine, 150 mcg, Daily  lidocaine, 2 patch, Daily  loratadine, 10 mg, Daily  polyethylene glycol, 17 g, Daily  pravastatin, 80 mg, Daily With Dinner  QUEtiapine, 150 mg, BID  QUEtiapine, 400 mg, HS  senna-docusate sodium, 2 tablet, BID  zonisamide, 300 mg, HS      ketorolac, 15 mg, Q6H PRN  ondansetron, 4 mg, Q6H PRN  oxyCODONE, 5 mg, Q6H PRN   Or  oxyCODONE, 10 mg, Q6H PRN       Continuous          Labs:    CBC    Recent Labs     06/10/24  0556 06/11/24  0502   WBC 9.52 9.19   HGB 7.9* 7.7*   HCT 24.4* 23.9*    258   BANDSPCT 3  --      BMP    Recent Labs     06/10/24  0556   SODIUM 146   K 4.9   *   CO2 32   AGAP 4   BUN 27*   CREATININE 0.57*   CALCIUM 8.5       Coags    No recent results     Additional Electrolytes  Recent Labs     06/10/24  0556   MG 1.9   PHOS 3.5          Blood Gas    No recent results  No recent results LFTs  No recent results    Infectious  No recent results  Glucose  Recent Labs     06/10/24  0556   GLUC 150*               HUNTER Pascual

## 2024-06-11 NOTE — ASSESSMENT & PLAN NOTE
Thought to be secondary to hypoxia secondary to airway obstruction in the setting of chocking  Now post self extubation and at baseline mental status  WIth residual AHRF, 2/2 rib fractures vs aspiration as below  ECHO- EF 55%

## 2024-06-12 ENCOUNTER — APPOINTMENT (INPATIENT)
Dept: RADIOLOGY | Facility: HOSPITAL | Age: 59
DRG: 208 | End: 2024-06-12
Payer: MEDICARE

## 2024-06-12 LAB
ANION GAP SERPL CALCULATED.3IONS-SCNC: 6 MMOL/L (ref 4–13)
BUN SERPL-MCNC: 24 MG/DL (ref 5–25)
CA-I BLD-SCNC: 1.16 MMOL/L (ref 1.12–1.32)
CALCIUM SERPL-MCNC: 8.9 MG/DL (ref 8.4–10.2)
CHLORIDE SERPL-SCNC: 110 MMOL/L (ref 96–108)
CO2 SERPL-SCNC: 35 MMOL/L (ref 21–32)
CREAT SERPL-MCNC: 0.57 MG/DL (ref 0.6–1.3)
ERYTHROCYTE [DISTWIDTH] IN BLOOD BY AUTOMATED COUNT: 14.4 % (ref 11.6–15.1)
GFR SERPL CREATININE-BSD FRML MDRD: 113 ML/MIN/1.73SQ M
GLUCOSE SERPL-MCNC: 155 MG/DL (ref 65–140)
HCT VFR BLD AUTO: 22.4 % (ref 36.5–49.3)
HGB BLD-MCNC: 7.1 G/DL (ref 12–17)
MAGNESIUM SERPL-MCNC: 1.7 MG/DL (ref 1.9–2.7)
MCH RBC QN AUTO: 32 PG (ref 26.8–34.3)
MCHC RBC AUTO-ENTMCNC: 31.7 G/DL (ref 31.4–37.4)
MCV RBC AUTO: 101 FL (ref 82–98)
PHOSPHATE SERPL-MCNC: 3.7 MG/DL (ref 2.7–4.5)
PLATELET # BLD AUTO: 299 THOUSANDS/UL (ref 149–390)
PMV BLD AUTO: 9.9 FL (ref 8.9–12.7)
POTASSIUM SERPL-SCNC: 3.5 MMOL/L (ref 3.5–5.3)
RBC # BLD AUTO: 2.22 MILLION/UL (ref 3.88–5.62)
SODIUM SERPL-SCNC: 151 MMOL/L (ref 135–147)
WBC # BLD AUTO: 10.16 THOUSAND/UL (ref 4.31–10.16)

## 2024-06-12 PROCEDURE — 71045 X-RAY EXAM CHEST 1 VIEW: CPT

## 2024-06-12 PROCEDURE — 97535 SELF CARE MNGMENT TRAINING: CPT

## 2024-06-12 PROCEDURE — 97530 THERAPEUTIC ACTIVITIES: CPT

## 2024-06-12 PROCEDURE — 92526 ORAL FUNCTION THERAPY: CPT

## 2024-06-12 PROCEDURE — 99233 SBSQ HOSP IP/OBS HIGH 50: CPT | Performed by: ANESTHESIOLOGY

## 2024-06-12 PROCEDURE — 85027 COMPLETE CBC AUTOMATED: CPT

## 2024-06-12 PROCEDURE — 83735 ASSAY OF MAGNESIUM: CPT

## 2024-06-12 PROCEDURE — 82330 ASSAY OF CALCIUM: CPT

## 2024-06-12 PROCEDURE — 80048 BASIC METABOLIC PNL TOTAL CA: CPT

## 2024-06-12 PROCEDURE — 84100 ASSAY OF PHOSPHORUS: CPT

## 2024-06-12 RX ORDER — PREGABALIN 150 MG/1
150 CAPSULE ORAL 3 TIMES DAILY
Status: ON HOLD | COMMUNITY
End: 2024-06-12 | Stop reason: CLARIF

## 2024-06-12 RX ORDER — POLYETHYLENE GLYCOL 3350 17 G/17G
17 POWDER, FOR SOLUTION ORAL DAILY
Status: DISCONTINUED | OUTPATIENT
Start: 2024-06-12 | End: 2024-06-21 | Stop reason: HOSPADM

## 2024-06-12 RX ORDER — DEXTROSE MONOHYDRATE 50 MG/ML
50 INJECTION, SOLUTION INTRAVENOUS CONTINUOUS
Status: DISCONTINUED | OUTPATIENT
Start: 2024-06-12 | End: 2024-06-14

## 2024-06-12 RX ADMIN — ACETAMINOPHEN 975 MG: 325 TABLET, FILM COATED ORAL at 21:13

## 2024-06-12 RX ADMIN — LACOSAMIDE 150 MG: 100 TABLET, FILM COATED ORAL at 09:01

## 2024-06-12 RX ADMIN — CHLORHEXIDINE GLUCONATE 0.12% ORAL RINSE 15 ML: 1.2 LIQUID ORAL at 21:17

## 2024-06-12 RX ADMIN — DIVALPROEX SODIUM 500 MG: 125 CAPSULE ORAL at 21:05

## 2024-06-12 RX ADMIN — QUETIAPINE FUMARATE 400 MG: 100 TABLET ORAL at 21:13

## 2024-06-12 RX ADMIN — LEVETIRACETAM 1500 MG: 500 TABLET, FILM COATED ORAL at 21:05

## 2024-06-12 RX ADMIN — CHLORHEXIDINE GLUCONATE 0.12% ORAL RINSE 15 ML: 1.2 LIQUID ORAL at 09:02

## 2024-06-12 RX ADMIN — SENNOSIDES AND DOCUSATE SODIUM 2 TABLET: 50; 8.6 TABLET ORAL at 18:05

## 2024-06-12 RX ADMIN — CEFTRIAXONE SODIUM 1000 MG: 10 INJECTION, POWDER, FOR SOLUTION INTRAVENOUS at 14:23

## 2024-06-12 RX ADMIN — POLYETHYLENE GLYCOL 3350 17 G: 17 POWDER, FOR SOLUTION ORAL at 14:23

## 2024-06-12 RX ADMIN — GABAPENTIN 400 MG: 400 CAPSULE ORAL at 09:02

## 2024-06-12 RX ADMIN — ACETAMINOPHEN 975 MG: 325 TABLET, FILM COATED ORAL at 14:24

## 2024-06-12 RX ADMIN — DIVALPROEX SODIUM 500 MG: 125 CAPSULE ORAL at 05:42

## 2024-06-12 RX ADMIN — GABAPENTIN 400 MG: 400 CAPSULE ORAL at 18:05

## 2024-06-12 RX ADMIN — ZONISAMIDE 300 MG: 100 CAPSULE ORAL at 21:05

## 2024-06-12 RX ADMIN — QUETIAPINE FUMARATE 150 MG: 100 TABLET ORAL at 18:05

## 2024-06-12 RX ADMIN — LACOSAMIDE 200 MG: 100 TABLET, FILM COATED ORAL at 21:05

## 2024-06-12 RX ADMIN — ACETAMINOPHEN 975 MG: 325 TABLET, FILM COATED ORAL at 05:42

## 2024-06-12 RX ADMIN — DEXTROSE 50 ML/HR: 5 SOLUTION INTRAVENOUS at 10:58

## 2024-06-12 RX ADMIN — QUETIAPINE FUMARATE 150 MG: 100 TABLET ORAL at 09:00

## 2024-06-12 RX ADMIN — DIVALPROEX SODIUM 500 MG: 125 CAPSULE ORAL at 14:32

## 2024-06-12 RX ADMIN — LORATADINE 10 MG: 10 TABLET ORAL at 09:02

## 2024-06-12 RX ADMIN — LEVOTHYROXINE SODIUM 150 MCG: 0.15 TABLET ORAL at 09:01

## 2024-06-12 RX ADMIN — ENOXAPARIN SODIUM 40 MG: 40 INJECTION SUBCUTANEOUS at 09:02

## 2024-06-12 RX ADMIN — LEVETIRACETAM 1500 MG: 500 TABLET, FILM COATED ORAL at 09:01

## 2024-06-12 RX ADMIN — SENNOSIDES AND DOCUSATE SODIUM 2 TABLET: 50; 8.6 TABLET ORAL at 09:02

## 2024-06-12 RX ADMIN — PRAVASTATIN SODIUM 80 MG: 80 TABLET ORAL at 18:07

## 2024-06-12 RX ADMIN — GABAPENTIN 400 MG: 400 CAPSULE ORAL at 21:12

## 2024-06-12 RX ADMIN — LIDOCAINE 5% 2 PATCH: 700 PATCH TOPICAL at 09:02

## 2024-06-12 NOTE — PHYSICAL THERAPY NOTE
"Physical Therapy Treatment Note       06/12/24 1013   PT Last Visit   PT Visit Date 06/12/24   Note Type   Note Type Treatment   Pain Assessment   Pain Assessment Tool Blanchard-Baker FACES   Blanchard-Baker FACES Pain Rating 4   Restrictions/Precautions   Weight Bearing Precautions Per Order Yes   RLE Weight Bearing Per Order WBAT  (with no active hip abduction per chart review; previous PT eval 2/28/24)   Other Precautions Cognitive;Chair Alarm;Bed Alarm;Multiple lines;Telemetry;O2;Fall Risk;Pain;Agitated  (chest tube, O2 NC)   General   Chart Reviewed Yes   Response to Previous Treatment Patient unable to report, no changes reported from family or staff   Family/Caregiver Present No   Cognition   Overall Cognitive Status Impaired   Arousal/Participation Alert;Responsive   Attention Attends with cues to redirect   Orientation Level Oriented to person;Disoriented to place;Disoriented to time;Disoriented to situation   Memory Decreased short term memory;Decreased recall of recent events;Decreased recall of precautions   Following Commands Follows one step commands with increased time or repetition   Comments pt agreeable to PT with maximal encouragement, education on importance of OOB mobility; pt agitated at times, pt repeatedly cursing throughout session   Subjective   Subjective \"no\"   Bed Mobility   Supine to Sit 3  Moderate assistance   Additional items Assist x 2;HOB elevated;Increased time required;Verbal cues;Bedrails   Transfers   Sit to Stand 3  Moderate assistance   Additional items Assist x 2;Armrests;Increased time required;Verbal cues   Stand to Sit 3  Moderate assistance   Additional items Assist x 2;Armrests;Increased time required;Verbal cues   Stand pivot 3  Moderate assistance   Additional items Assist x 2;Armrests;Increased time required;Verbal cues   Balance   Static Sitting Fair   Dynamic Sitting Fair -   Static Standing Poor   Dynamic Standing Poor -   Endurance Deficit   Endurance Deficit Yes "   Activity Tolerance   Activity Tolerance Patient limited by fatigue;Treatment limited secondary to agitation   Medical Staff Made Aware Co-treat performed with OT secondary to complex medical condition of patient and regression of functional status from baseline. PT/OT goals were addressed separately.   Nurse Made Aware DOUGLAS Krishnan   Assessment   Prognosis Fair   Problem List Decreased strength;Decreased endurance;Impaired balance;Decreased mobility;Decreased cognition;Pain   Assessment Pt seen for PT treatment session this date, consisting of ther act focused on bed mobility, transfers, sitting/standing balance/posture facilitation . Pt requiring mod a x2 for bed mobility and transfers; gait deferred 2* cognitive deficits. pt agreeable to PT with maximal encouragement, education on importance of OOB mobility; pt agitated at times, pt repeatedly cursing throughout session. Pertinent barriers during this session include cognitive status. Current goals and POC established on IE remain appropriate, pt continues to have rehab potential and is making fair progress towards STGs. Pt prognosis for achieving goals is fair, pending pt progress with hospitalization/medical status improvements, and indicated by Stimulability. Pt limited d/t lack of self-control (impulsivity), self limiting, and non compliance. Pt continues to be functioning below baseline level, and remains limited 2* factors listed above. PT will continue to see pt during current hospitalization in order to address the deficits listed above and provide interventions consistent w/ POC in effort to achieve STGs. PT recommends level 2, moderate resource intensity upon discharge.   Barriers to Discharge Inaccessible home environment;Decreased caregiver support   Goals   Patient Goals none expressed   STG Expiration Date 06/15/24   PT Treatment Day 1   Plan   Treatment/Interventions Functional transfer training;LE strengthening/ROM;Therapeutic  exercise;Endurance training;Cognitive reorientation;Patient/family training;Bed mobility;Continued evaluation;Spoke to nursing;OT   Progress Progressing toward goals   PT Frequency 3-5x/wk   Discharge Recommendation   Rehab Resource Intensity Level, PT II (Moderate Resource Intensity)   AM-PAC Basic Mobility Inpatient   Turning in Flat Bed Without Bedrails 2   Lying on Back to Sitting on Edge of Flat Bed Without Bedrails 1   Moving Bed to Chair 1   Standing Up From Chair Using Arms 1   Walk in Room 1   Climb 3-5 Stairs With Railing 1   Basic Mobility Inpatient Raw Score 7   Turning Head Towards Sound 3   Follow Simple Instructions 2   Low Function Basic Mobility Raw Score  12   Low Function Basic Mobility Standardized Score  18.33   [facility] Highest Level Of Mobility   -[facility] Goal 2: Bed activities/Dependent transfer   -[facility] Achieved 4: Move to chair/commode   Education   Education Provided Mobility training   Patient Reinforcement needed   End of Consult   Patient Position at End of Consult Bedside chair;Bed/Chair alarm activated;All needs within reach         Merlene Reyes, PT, DPT

## 2024-06-12 NOTE — PROGRESS NOTES
[facility]  Progress Note  Name: Jairon Oconnell I  MRN: [id number]  Unit/Bed#: ICU 10 I Date of Admission: 6/3/2024   Date of Service: 6/12/2024 I Hospital Day: 9    Assessment & Plan   * Cardiac arrest (HCC)  Assessment & Plan  Thought to be secondary to hypoxia secondary to airway obstruction in the setting of chocking  Now post self extubation and at baseline mental status  WIth residual AHRF, 2/2 rib fractures vs aspiration as below  ECHO- EF 55%    Acute respiratory failure with hypoxia and hypercapnia (HCC)  Assessment & Plan  S/p self extubation 6/4  With residual AHRF in setting of rib fractures, likely aspiration  Now on [place] oxygen  Wean as able to maintain Spo2 >92%    Closed traumatic fracture of ribs of right side with pneumothorax  Assessment & Plan  S/p 10 Fr right chest tube, discontinued on 6/8  32 F chest tube placed 6/9 due to hemothorax and persistent small pneumothorax  CXR still showing small apical pneumo  Monitor output from chest tube    Dysphagia  Assessment & Plan  Aspiration precautions  Pills crushed in pudding    Acute metabolic encephalopathy  Assessment & Plan  Now s/p self extubation  Appears to be at baseline mental status  Serial neuro checks  CAM ICU  Delirium precautions    Ambulatory dysfunction  Assessment & Plan  PT/OT when appropriate  Fall precautions    Behavior concern in adult  Assessment & Plan  Home seroquel restarted    Acquired hypothyroidism  Assessment & Plan  Continue home levothyroxine    Generalized convulsive epilepsy (HCC)  Assessment & Plan  Continue home medications  Depakote restarted to avoid withdrawal seizures             Disposition: Stepdown Level 1    ICU Core Measures     A: Assess, Prevent, and Manage Pain Has pain been assessed? Yes  Need for changes to pain regimen? No   B: Both SAT/SAT  N/A   C: Choice of Sedation RASS Goal: N/A patient not on sedation  Need for changes to sedation or analgesia regimen? NA   D: Delirium  CAM-ICU: Unable to perform secondary to Dementia or other chronic cognitive dysfunction   E: Early Mobility  Plan for early mobility? Yes   F: Family Engagement Plan for family engagement today? Yes       Antibiotic Review: Awaiting culture results.     Review of Invasive Devices:            Prophylaxis:  VTE VTE covered by:  enoxaparin, Subcutaneous, 40 mg at 24 1047       Stress Ulcer  not ordered         Significant 24hr Events     24hr events: No overnight events.     Subjective   Review of Systems: See HPI for Review of Systems     Objective                            Vitals I/O      Most Recent Min/Max in 24hrs   Temp 97.8 °F (36.6 °C) Temp  Min: 97.8 °F (36.6 °C)  Max: 98.9 °F (37.2 °C)   Pulse 85 Pulse  Min: 85  Max: 97   Resp 14 Resp  Min: 14  Max: 19   /66 BP  Min: 145/64  Max: 178/74   O2 Sat 98 % SpO2  Min: 89 %  Max: 98 %      Intake/Output Summary (Last 24 hours) at 2024 0527  Last data filed at 2024 1824  Gross per 24 hour   Intake 1559.33 ml   Output 1585 ml   Net -25.67 ml       Diet Dysphagia/Modified Consistency; Dysphagia 1-Pureed; Honey Thick Liquid; Honey Thick Liquid    Invasive Monitoring           Physical Exam   Physical Exam  Eyes:      Conjunctiva/sclera: Conjunctivae normal.   Skin:     General: Skin is warm and dry.      Coloration: Skin is pale.   HENT:      Head: Normocephalic and atraumatic.   Cardiovascular:      Rate and Rhythm: Normal rate and regular rhythm.      Pulses: Normal pulses.      Heart sounds: Normal heart sounds.   Musculoskeletal:      Right lower le+ Edema present.      Left lower le+ Edema present.      Comments: B/L UE edema   Abdominal:      Palpations: Abdomen is soft.   Constitutional:       Appearance: He is ill-appearing.   Chest:      Comments: Right sided chest tube  Neurological:      General: No focal deficit present.      Mental Status: He is alert. Mental status is at baseline.            Diagnostic Studies      EKG: Sinus  rhythm in the 80s on telemetry  Imaging:  I have personally reviewed pertinent reports.       Medications:  Scheduled PRN   acetaminophen, 975 mg, Q8H RONALD  cefTRIAXone, 1,000 mg, Q24H  chlorhexidine, 15 mL, Q12H RONALD  divalproex sodium, 500 mg, Q8H RONALD  enoxaparin, 40 mg, Q24H RONALD  gabapentin, 400 mg, TID  lacosamide, 150 mg, QAM  lacosamide, 200 mg, HS  levETIRAcetam, 1,500 mg, Q12H RONALD  levothyroxine, 150 mcg, Daily  lidocaine, 2 patch, Daily  loratadine, 10 mg, Daily  pravastatin, 80 mg, Daily With Dinner  QUEtiapine, 150 mg, BID  QUEtiapine, 400 mg, HS  senna-docusate sodium, 2 tablet, BID  zonisamide, 300 mg, HS      ketorolac, 15 mg, Q6H PRN  ondansetron, 4 mg, Q6H PRN  oxyCODONE, 2.5 mg, Q6H PRN   Or  oxyCODONE, 5 mg, Q6H PRN  polyethylene glycol, 17 g, Daily PRN       Continuous          Labs:    CBC    Recent Labs     06/10/24  0556 06/11/24  0502   WBC 9.52 9.19   HGB 7.9* 7.7*   HCT 24.4* 23.9*    258   BANDSPCT 3  --      BMP    Recent Labs     06/10/24  0556 06/11/24  0502   SODIUM 146 152*   K 4.9 4.0   * 114*   CO2 32 34*   AGAP 4 4   BUN 27* 28*   CREATININE 0.57* 0.57*   CALCIUM 8.5 8.9       Coags    No recent results     Additional Electrolytes  Recent Labs     06/10/24  0556 06/11/24  0502   MG 1.9 2.0   PHOS 3.5 4.1          Blood Gas    No recent results  No recent results LFTs  Recent Labs     06/11/24  0502   ALT 42   AST 28   ALKPHOS 74   ALB 3.1*   TBILI 0.34       Infectious  Recent Labs     06/11/24  0502   PROCALCITONI 0.26*     Glucose  Recent Labs     06/10/24  0556 06/11/24  0502   GLUC 150* 153*               HUNTER Pascual

## 2024-06-12 NOTE — PLAN OF CARE
Problem: OCCUPATIONAL THERAPY ADULT  Goal: Performs self-care activities at highest level of function for planned discharge setting.  See evaluation for individualized goals.  Description: Treatment Interventions: ADL retraining, Functional transfer training, Endurance training, Patient/family training          See flowsheet documentation for full assessment, interventions and recommendations.   Outcome: Progressing  Note: Limitation: Decreased ADL status, Decreased UE strength, Decreased Safe judgement during ADL, Decreased cognition, Decreased endurance, Decreased self-care trans, Decreased high-level ADLs, Decreased fine motor control, Decreased UE ROM  Prognosis: Good  Assessment: Pt seen this date for skilled OT session focused on ADLs, functional transfers and mobility, safety education. The patient was received seated upright in bed, NAD, PIV access, tele, on 2L O2 NC, R chest tube, condom catheter. He participated in grooming ADL, functional bed mobility, and stand pivot transfer this date. Pt required maximum verbal cues and encouragement to perform grooming and mobility. Pt required Moderate Assistance x2 for STS transfer and stand pivot to bed side chair. Moderate Assistance for grooming ADL while seated. At end of session the patient was located seated in bed side chair with call bell in reach and all needs met. Overall the patient remains below his functional baseline, and is primarily limited at this time due to impaired cognition, generalized deconditioning, and decreased activity tolerance. OT will continue to follow while acute to address POC. At this time, recommend Level 2 Moderate Resource Intensity upon d/c.     Rehab Resource Intensity Level, OT: II (Moderate Resource Intensity)        Neha Velasquez OTR/L

## 2024-06-12 NOTE — OCCUPATIONAL THERAPY NOTE
Occupational Therapy Progress Note     Patient Name: Jairon Oconnell  Today's Date: 6/12/2024  Problem List  Principal Problem:    Cardiac arrest (HCC)  Active Problems:    Generalized convulsive epilepsy (HCC)    Acquired hypothyroidism    Behavior concern in adult    Ambulatory dysfunction    Acute metabolic encephalopathy    Dysphagia    Closed traumatic fracture of ribs of right side with pneumothorax    Acute respiratory failure with hypoxia and hypercapnia (HCC)          06/12/24 1015   OT Last Visit   OT Visit Date 06/12/24   Note Type   Note Type Treatment   Pain Assessment   Pain Assessment Tool Blanchard-Baker FACES   Blanchard-Baker FACES Pain Rating 4   Restrictions/Precautions   Weight Bearing Precautions Per Order Yes   RLE Weight Bearing Per Order WBAT  (with no active hip abduction per chart review; previous PT eval 2/28/24)   Other Precautions Cognitive;Chair Alarm;Bed Alarm;Multiple lines;Telemetry;O2;Fall Risk;Pain  (R chest tube)   ADL   Where Assessed Other (Comment)  (Assist levels for some self care tasks are based on functional assessment of performance skills and deficits observed during session.)   Eating Assistance 3  Moderate Assistance   Eating Deficit Setup;Impulsive;Supervision/safety;Increased time to complete   Grooming Assistance 3  Moderate Assistance   Grooming Deficit Setup;Supervision/safety;Increased time to complete;Wash/dry face  (initially hand over hand assist)   UB Bathing Assistance 2  Maximal Assistance   LB Bathing Assistance 1  Total Assistance   UB Dressing Assistance 2  Maximal Assistance   LB Dressing Assistance 1  Total Assistance   Toileting Assistance  1  Total Assistance   Bed Mobility   Supine to Sit 3  Moderate assistance   Additional items Assist x 2;HOB elevated;Increased time required;Verbal cues;LE management   Transfers   Sit to Stand 3  Moderate assistance   Additional items Assist x 2;Increased time required;Verbal cues   Stand to Sit 3  Moderate assistance    Additional items Assist x 2;Armrests;Increased time required;Verbal cues   Stand pivot 3  Moderate assistance   Additional items Assist x 2;Armrests;Increased time required   Additional Comments with HHA   Functional Mobility   Functional Mobility 3  Moderate assistance   Additional Comments assist x2   Additional items Hand hold assistance   Cognition   Overall Cognitive Status Impaired   Arousal/Participation Alert;Responsive   Attention Attends with cues to redirect   Orientation Level Oriented to person;Disoriented to place;Disoriented to time;Disoriented to situation   Memory Decreased recall of recent events;Decreased short term memory   Following Commands Follows one step commands with increased time or repetition   Activity Tolerance   Activity Tolerance Patient limited by fatigue   Medical Staff Made Aware Merlene PT  (Pt seen for co-treatment with Physical Therapist due to pt's medical complexity, functional limitations and limited activity tolerance.)   Assessment   Assessment Pt seen this date for skilled OT session focused on ADLs, functional transfers and mobility, safety education. The patient was received seated upright in bed, NAD, PIV access, tele, on 2L O2 NC, R chest tube, condom catheter. He participated in grooming ADL, functional bed mobility, and stand pivot transfer this date. Pt required maximum verbal cues and encouragement to perform grooming and mobility. Pt required Moderate Assistance x2 for STS transfer and stand pivot to bed side chair. Moderate Assistance for grooming ADL while seated. At end of session the patient was located seated in bed side chair with call bell in reach and all needs met. Overall the patient remains below his functional baseline, and is primarily limited at this time due to impaired cognition, generalized deconditioning, and decreased activity tolerance. OT will continue to follow while acute to address POC. At this time, recommend Level 2 Moderate Resource  Intensity upon d/c.   Plan   Treatment Interventions ADL retraining;Functional transfer training;Endurance training;Patient/family training   Goal Expiration Date 06/20/24   OT Treatment Day 1   OT Frequency 3-5x/wk   Discharge Recommendation   Rehab Resource Intensity Level, OT II (Moderate Resource Intensity)   AM-PAC Daily Activity Inpatient   Lower Body Dressing 1   Bathing 2   Toileting 1   Upper Body Dressing 2   Grooming 2   Eating 2   Daily Activity Raw Score 10   AM-PAC Applied Cognition Inpatient   Following a Speech/Presentation 2   Understanding Ordinary Conversation 3   Taking Medications 1   Remembering Where Things Are Placed or Put Away 1   Remembering List of 4-5 Errands 1   Taking Care of Complicated Tasks 1   Applied Cognition Raw Score 9   Applied Cognition Standardized Score 22.48         Neha Velasquez, OTR/L

## 2024-06-12 NOTE — PLAN OF CARE
Plan/Recommendations:  Puree/pudding thick liquids  **hold if continuous coughing noted  Meds crushed  1:1 full feeding assist  Alternate solids and liquids, slow rate of feeding, alternate bites and sips.  Liquids by teaspoon amounts only  Upright 90' for all meals  ST follow-up x1-3

## 2024-06-12 NOTE — PLAN OF CARE
Problem: PAIN - ADULT  Goal: Verbalizes/displays adequate comfort level or baseline comfort level  Description: Interventions:  - Encourage patient to monitor pain and request assistance  - Assess pain using appropriate pain scale  - Administer analgesics based on type and severity of pain and evaluate response  - Implement non-pharmacological measures as appropriate and evaluate response  - Consider cultural and social influences on pain and pain management  - Notify physician/advanced practitioner if interventions unsuccessful or patient reports new pain  Outcome: Progressing     Problem: SAFETY ADULT  Goal: Patient will remain free of falls  Description: INTERVENTIONS:  - Educate patient/family on patient safety including physical limitations  - Instruct patient to call for assistance with activity   - Consult OT/PT to assist with strengthening/mobility   - Keep Call bell within reach  - Keep bed low and locked with side rails adjusted as appropriate  - Keep care items and personal belongings within reach  - Initiate and maintain comfort rounds  - Make Fall Risk Sign visible to staff  - Offer Toileting every 2 Hours, in advance of need  - Initiate/Maintain bed alarm  - Apply yellow socks and bracelet for high fall risk patients  - Consider moving patient to room near nurses station  Outcome: Progressing     Problem: Nutrition/Hydration-ADULT  Goal: Nutrient/Hydration intake appropriate for improving, restoring or maintaining nutritional needs  Description: Monitor and assess patient's nutrition/hydration status for malnutrition. Collaborate with interdisciplinary team and initiate plan and interventions as ordered.  Monitor patient's weight and dietary intake as ordered or per policy. Utilize nutrition screening tool and intervene as necessary. Determine patient's food preferences and provide high-protein, high-caloric foods as appropriate.     INTERVENTIONS:  - Monitor oral intake, urinary output, labs, and  treatment plans  - Assess nutrition and hydration status and recommend course of action  - Evaluate amount of meals eaten  - Assist patient with eating if necessary   - Allow adequate time for meals  - Recommend/ encourage appropriate diets, oral nutritional supplements, and vitamin/mineral supplements  - Order, calculate, and assess calorie counts as needed  - Recommend, monitor, and adjust tube feedings and TPN/PPN based on assessed needs  - Assess need for intravenous fluids  - Provide specific nutrition/hydration education as appropriate  - Include patient/family/caregiver in decisions related to nutrition  Outcome: Progressing     Problem: Prexisting or High Potential for Compromised Skin Integrity  Goal: Skin integrity is maintained or improved  Description: INTERVENTIONS:  - Identify patients at risk for skin breakdown  - Assess and monitor skin integrity  - Assess and monitor nutrition and hydration status  - Monitor labs   - Assess for incontinence   - Turn and reposition patient  - Assist with mobility/ambulation  - Relieve pressure over bony prominences  - Avoid friction and shearing  - Provide appropriate hygiene as needed including keeping skin clean and dry  - Evaluate need for skin moisturizer/barrier cream  - Collaborate with interdisciplinary team   - Patient/family teaching  - Consider wound care consult   Outcome: Progressing

## 2024-06-12 NOTE — SPEECH THERAPY NOTE
Speech Language/Pathology    Speech/Language Pathology Progress Note    Patient Name: Jairon Oconnell  Today's Date: 6/12/2024     Problem List  Principal Problem:    Cardiac arrest (HCC)  Active Problems:    Generalized convulsive epilepsy (HCC)    Acquired hypothyroidism    Behavior concern in adult    Ambulatory dysfunction    Acute metabolic encephalopathy    Dysphagia    Closed traumatic fracture of ribs of right side with pneumothorax    Acute respiratory failure with hypoxia and hypercapnia ([facility])       Past Medical History  Past Medical History:   Diagnosis Date    ADRIANA (acute kidney injury) ([facility]) 9/24/2019    Bacteremia due to Gram-positive bacteria 9/7/2021    Buttock wound, left, subsequent encounter 11/5/2021    COVID-19     CP (cerebral palsy) ([facility])     Depression     Diabetes ([facility])     Disease of thyroid gland     Gait disorder     Gluteal abscess 9/6/2021    Hyperlipidemia     Hypertension     Kidney failure     Kidney stones     Moderate protein-calorie malnutrition ([facility]) 12/22/2021    Osteoporosis     Pressure injury of left buttock, stage 4 ([facility]) 3/9/2022    Psychiatric disorder     Scoliosis     Seizures ([facility])     Sepsis ([facility]) 9/25/2019    Thyroid disease     UTI (urinary tract infection)         Past Surgical History  Past Surgical History:   Procedure Laterality Date    APPENDECTOMY      IR PICC PLACEMENT SINGLE LUMEN  9/13/2021    IR PICC PLACEMENT SINGLE LUMEN  9/16/2021       Subjective:  Pt received awake and alert in bed. Positioned bed at 90' and provided oral care prior to administering breakfast meal. Nurse reported coughing with breakfast yesterday, reported more towards end of meal after administering thickened apple juice.     Objective:  The following consistencies were administered today: puree and HTL. Foods included pureed eggs, waffles, pudding and thickened apple juice. Administered clinician controlled tsp amounts of puree- bolus retrieval and containment were adequate. Oral  mastication was prolonged but functional. A-P transfer noted to be quicker but functional for pt. Swallow initiation appeared delayed. Nurse administered meds crushed in pudding- no overt s/s of aspiration or changes in respiratory status noted.   Administered HTL by tsp amounts- swallow initiation appeared delayed. Noted strong coughx2 with HTL by tsp and suction required to clear oral residue. No changes in respiratory status or voice quality noted. O2 saturation remains 98% on 4L NC         Assessment:  Pt continues to present with oropharyngeal dysphagia- pt appears to be managing well on puree however frequent cough noted with HTL during both today's trial and with meals yesterday as reported by nurse. Recommending temporarily downgrading to pudding thick liquids in order to r/o difficulties with HTL.     Plan/Recommendations:  Puree/pudding thick liquids  **hold if continuous coughing noted with pudding  Meds crushed  1:1 full feeding assist  Alternate solids and liquids, slow rate of feeding, alternate bites and sips.  Pudding thick liquids by tsp amounts only  Upright 90' for all meals  ST follow-up x1-3

## 2024-06-12 NOTE — PLAN OF CARE
Problem: PHYSICAL THERAPY ADULT  Goal: Performs mobility at highest level of function for planned discharge setting.  See evaluation for individualized goals.  Description: Treatment/Interventions: Functional transfer training, LE strengthening/ROM, Therapeutic exercise, Endurance training, Cognitive reorientation, Patient/family training, Bed mobility, Continued evaluation, Spoke to nursing, OT          See flowsheet documentation for full assessment, interventions and recommendations.  Outcome: Progressing  Note: Prognosis: Fair  Problem List: Decreased strength, Decreased endurance, Impaired balance, Decreased mobility, Decreased cognition, Pain  Assessment: Pt seen for PT treatment session this date, consisting of ther act focused on bed mobility, transfers, sitting/standing balance/posture facilitation . Pt requiring mod a x2 for bed mobility and transfers; gait deferred 2* cognitive deficits. pt agreeable to PT with maximal encouragement, education on importance of OOB mobility; pt agitated at times, pt repeatedly cursing throughout session. Pertinent barriers during this session include cognitive status. Current goals and POC established on IE remain appropriate, pt continues to have rehab potential and is making fair progress towards STGs. Pt prognosis for achieving goals is fair, pending pt progress with hospitalization/medical status improvements, and indicated by Stimulability. Pt limited d/t lack of self-control (impulsivity), self limiting, and non compliance. Pt continues to be functioning below baseline level, and remains limited 2* factors listed above. PT will continue to see pt during current hospitalization in order to address the deficits listed above and provide interventions consistent w/ POC in effort to achieve STGs. PT recommends level 2, moderate resource intensity upon discharge.  Barriers to Discharge: Inaccessible home environment, Decreased caregiver support     Rehab Resource  Intensity Level, PT: II (Moderate Resource Intensity)    See flowsheet documentation for full assessment.

## 2024-06-13 ENCOUNTER — APPOINTMENT (INPATIENT)
Dept: CT IMAGING | Facility: HOSPITAL | Age: 59
DRG: 208 | End: 2024-06-13
Payer: MEDICARE

## 2024-06-13 ENCOUNTER — APPOINTMENT (INPATIENT)
Dept: RADIOLOGY | Facility: HOSPITAL | Age: 59
DRG: 208 | End: 2024-06-13
Payer: MEDICARE

## 2024-06-13 LAB
ANION GAP SERPL CALCULATED.3IONS-SCNC: 7 MMOL/L (ref 4–13)
BUN SERPL-MCNC: 19 MG/DL (ref 5–25)
CA-I BLD-SCNC: 1.16 MMOL/L (ref 1.12–1.32)
CALCIUM SERPL-MCNC: 9.1 MG/DL (ref 8.4–10.2)
CHLORIDE SERPL-SCNC: 108 MMOL/L (ref 96–108)
CO2 SERPL-SCNC: 32 MMOL/L (ref 21–32)
CREAT SERPL-MCNC: 0.53 MG/DL (ref 0.6–1.3)
ERYTHROCYTE [DISTWIDTH] IN BLOOD BY AUTOMATED COUNT: 14.3 % (ref 11.6–15.1)
GFR SERPL CREATININE-BSD FRML MDRD: 117 ML/MIN/1.73SQ M
GLUCOSE SERPL-MCNC: 178 MG/DL (ref 65–140)
GLUCOSE SERPL-MCNC: 182 MG/DL (ref 65–140)
HCT VFR BLD AUTO: 26.6 % (ref 36.5–49.3)
HGB BLD-MCNC: 8.3 G/DL (ref 12–17)
MAGNESIUM SERPL-MCNC: 1.6 MG/DL (ref 1.9–2.7)
MCH RBC QN AUTO: 31.4 PG (ref 26.8–34.3)
MCHC RBC AUTO-ENTMCNC: 31.2 G/DL (ref 31.4–37.4)
MCV RBC AUTO: 101 FL (ref 82–98)
PHOSPHATE SERPL-MCNC: 4.1 MG/DL (ref 2.7–4.5)
PLATELET # BLD AUTO: 360 THOUSANDS/UL (ref 149–390)
PMV BLD AUTO: 10.1 FL (ref 8.9–12.7)
POTASSIUM SERPL-SCNC: 4.2 MMOL/L (ref 3.5–5.3)
RBC # BLD AUTO: 2.64 MILLION/UL (ref 3.88–5.62)
SODIUM SERPL-SCNC: 147 MMOL/L (ref 135–147)
WBC # BLD AUTO: 11.28 THOUSAND/UL (ref 4.31–10.16)

## 2024-06-13 PROCEDURE — 82330 ASSAY OF CALCIUM: CPT

## 2024-06-13 PROCEDURE — 71045 X-RAY EXAM CHEST 1 VIEW: CPT

## 2024-06-13 PROCEDURE — 92526 ORAL FUNCTION THERAPY: CPT

## 2024-06-13 PROCEDURE — 71260 CT THORAX DX C+: CPT

## 2024-06-13 PROCEDURE — 97530 THERAPEUTIC ACTIVITIES: CPT

## 2024-06-13 PROCEDURE — 82948 REAGENT STRIP/BLOOD GLUCOSE: CPT

## 2024-06-13 PROCEDURE — 84100 ASSAY OF PHOSPHORUS: CPT

## 2024-06-13 PROCEDURE — 85027 COMPLETE CBC AUTOMATED: CPT

## 2024-06-13 PROCEDURE — NC001 PR NO CHARGE: Performed by: NURSE PRACTITIONER

## 2024-06-13 PROCEDURE — 83735 ASSAY OF MAGNESIUM: CPT

## 2024-06-13 PROCEDURE — 80048 BASIC METABOLIC PNL TOTAL CA: CPT

## 2024-06-13 PROCEDURE — 99233 SBSQ HOSP IP/OBS HIGH 50: CPT | Performed by: ANESTHESIOLOGY

## 2024-06-13 RX ORDER — CHLORDIAZEPOXIDE HYDROCHLORIDE 25 MG/1
25 CAPSULE, GELATIN COATED ORAL ONCE
Status: DISCONTINUED | OUTPATIENT
Start: 2024-06-13 | End: 2024-06-13

## 2024-06-13 RX ADMIN — GABAPENTIN 400 MG: 400 CAPSULE ORAL at 21:59

## 2024-06-13 RX ADMIN — CEFTRIAXONE SODIUM 1000 MG: 10 INJECTION, POWDER, FOR SOLUTION INTRAVENOUS at 13:45

## 2024-06-13 RX ADMIN — LIDOCAINE 5% 2 PATCH: 700 PATCH TOPICAL at 09:01

## 2024-06-13 RX ADMIN — ACETAMINOPHEN 975 MG: 325 TABLET, FILM COATED ORAL at 21:59

## 2024-06-13 RX ADMIN — LEVOTHYROXINE SODIUM 150 MCG: 0.15 TABLET ORAL at 09:06

## 2024-06-13 RX ADMIN — GABAPENTIN 400 MG: 400 CAPSULE ORAL at 09:07

## 2024-06-13 RX ADMIN — CHLORHEXIDINE GLUCONATE 0.12% ORAL RINSE 15 ML: 1.2 LIQUID ORAL at 22:04

## 2024-06-13 RX ADMIN — ACETAMINOPHEN 975 MG: 325 TABLET, FILM COATED ORAL at 05:20

## 2024-06-13 RX ADMIN — QUETIAPINE FUMARATE 400 MG: 100 TABLET ORAL at 21:59

## 2024-06-13 RX ADMIN — LACOSAMIDE 150 MG: 100 TABLET, FILM COATED ORAL at 09:06

## 2024-06-13 RX ADMIN — IOHEXOL 100 ML: 350 INJECTION, SOLUTION INTRAVENOUS at 12:01

## 2024-06-13 RX ADMIN — DIVALPROEX SODIUM 500 MG: 125 CAPSULE ORAL at 05:20

## 2024-06-13 RX ADMIN — LEVETIRACETAM 1500 MG: 500 TABLET, FILM COATED ORAL at 09:06

## 2024-06-13 RX ADMIN — SENNOSIDES AND DOCUSATE SODIUM 2 TABLET: 50; 8.6 TABLET ORAL at 09:07

## 2024-06-13 RX ADMIN — LACOSAMIDE 200 MG: 100 TABLET, FILM COATED ORAL at 21:59

## 2024-06-13 RX ADMIN — DIVALPROEX SODIUM 500 MG: 125 CAPSULE ORAL at 21:59

## 2024-06-13 RX ADMIN — ENOXAPARIN SODIUM 40 MG: 40 INJECTION SUBCUTANEOUS at 09:02

## 2024-06-13 RX ADMIN — ACETAMINOPHEN 975 MG: 325 TABLET, FILM COATED ORAL at 16:22

## 2024-06-13 RX ADMIN — CHLORHEXIDINE GLUCONATE 0.12% ORAL RINSE 15 ML: 1.2 LIQUID ORAL at 09:02

## 2024-06-13 RX ADMIN — ZONISAMIDE 300 MG: 100 CAPSULE ORAL at 22:04

## 2024-06-13 RX ADMIN — DEXTROSE 50 ML/HR: 5 SOLUTION INTRAVENOUS at 05:41

## 2024-06-13 RX ADMIN — PRAVASTATIN SODIUM 80 MG: 80 TABLET ORAL at 16:23

## 2024-06-13 RX ADMIN — DIVALPROEX SODIUM 500 MG: 125 CAPSULE ORAL at 16:22

## 2024-06-13 RX ADMIN — LORATADINE 10 MG: 10 TABLET ORAL at 09:07

## 2024-06-13 RX ADMIN — LEVETIRACETAM 1500 MG: 500 TABLET, FILM COATED ORAL at 21:58

## 2024-06-13 RX ADMIN — GABAPENTIN 400 MG: 400 CAPSULE ORAL at 16:23

## 2024-06-13 RX ADMIN — POLYETHYLENE GLYCOL 3350 17 G: 17 POWDER, FOR SOLUTION ORAL at 09:05

## 2024-06-13 RX ADMIN — QUETIAPINE FUMARATE 150 MG: 100 TABLET ORAL at 09:07

## 2024-06-13 NOTE — ASSESSMENT & PLAN NOTE
S/p 10 Fr right chest tube, discontinued on 6/8  32 F chest tube placed 6/9 due to hemothorax and persistent small pneumothorax  Residual pneumothorax now resolved  Monitor output from chest tube

## 2024-06-13 NOTE — PLAN OF CARE
Problem: PAIN - ADULT  Goal: Verbalizes/displays adequate comfort level or baseline comfort level  Description: Interventions:  - Encourage patient to monitor pain and request assistance  - Assess pain using appropriate pain scale  - Administer analgesics based on type and severity of pain and evaluate response  - Implement non-pharmacological measures as appropriate and evaluate response  - Consider cultural and social influences on pain and pain management  - Notify physician/advanced practitioner if interventions unsuccessful or patient reports new pain  Outcome: Progressing     Problem: INFECTION - ADULT  Goal: Absence or prevention of progression during hospitalization  Description: INTERVENTIONS:  - Assess and monitor for signs and symptoms of infection  - Monitor lab/diagnostic results  - Monitor all insertion sites, i.e. indwelling lines, tubes, and drains  - Monitor endotracheal if appropriate and nasal secretions for changes in amount and color  - [place] appropriate cooling/warming therapies per order  - Administer medications as ordered  - Instruct and encourage patient and family to use good hand hygiene technique  - Identify and instruct in appropriate isolation precautions for identified infection/condition  Outcome: Progressing     Problem: SAFETY ADULT  Goal: Patient will remain free of falls  Description: INTERVENTIONS:  - Educate patient/family on patient safety including physical limitations  - Instruct patient to call for assistance with activity   - Consult OT/PT to assist with strengthening/mobility   - Keep Call bell within reach  - Keep bed low and locked with side rails adjusted as appropriate  - Keep care items and personal belongings within reach  - Initiate and maintain comfort rounds  - Make Fall Risk Sign visible to staff  - Offer Toileting every 2 Hours, in advance of need  - Initiate/Maintain bed alarm  - Apply yellow socks and bracelet for high fall risk patients  - Consider  moving patient to room near nurses station  Outcome: Progressing  Goal: Maintain or return to baseline ADL function  Description: INTERVENTIONS:  -  Assess patient's ability to carry out ADLs; assess patient's baseline for ADL function and identify physical deficits which impact ability to perform ADLs (bathing, care of mouth/teeth, toileting, grooming, dressing, etc.)  - Assess/evaluate cause of self-care deficits   - Assess range of motion  - Assess patient's mobility; develop plan if impaired  - Assess patient's need for assistive devices and provide as appropriate  - Encourage maximum independence but intervene and supervise when necessary  - Involve family in performance of ADLs  - Assess for home care needs following discharge   - Consider OT consult to assist with ADL evaluation and planning for discharge  - Provide patient education as appropriate  Outcome: Progressing  Goal: Maintains/Returns to pre admission functional level  Description: INTERVENTIONS:  - Perform AM-PAC 6 Click Basic Mobility/ Daily Activity assessment daily.  - Set and communicate daily mobility goal to care team and patient/family/caregiver.   - Collaborate with rehabilitation services on mobility goals if consulted  - Reposition patient every 2 hours.  - Dangle patient 1 times a day  - Stand patient 1 times a day  - Out of bed to chair 1 times a day   - Out of bed for meals 1 times a day  - Out of bed for toileting  - Record patient progress and toleration of activity level   Outcome: Progressing

## 2024-06-13 NOTE — PLAN OF CARE
Problem: PHYSICAL THERAPY ADULT  Goal: Performs mobility at highest level of function for planned discharge setting.  See evaluation for individualized goals.  Description: Treatment/Interventions: Functional transfer training, LE strengthening/ROM, Therapeutic exercise, Endurance training, Cognitive reorientation, Patient/family training, Bed mobility, Continued evaluation, Spoke to nursing, OT          See flowsheet documentation for full assessment, interventions and recommendations.  Outcome: Progressing  Note: Prognosis: Fair  Problem List: Decreased strength, Decreased endurance, Impaired balance, Decreased mobility, Decreased cognition, Pain  Assessment: Pt seen for PT treatment session this date, consisting of ther act focused on bed mobility, transfers, sitting/standing balance/posture facilitation, short gait trial with use of RW from EOB to recliner. Pt requiring min a x2 for bed mobility, mod Ax2 for transfers and gait trial. pt agreeable to PT with maximal encouragement, education on importance of OOB mobility; pt agitated at times, minimal cursing throughout session. Current goals and POC established on IE remain appropriate, pt continues to have rehab potential and is making fair progress towards STGs. Pt prognosis for achieving goals is fair, pending pt progress with hospitalization/medical status improvements, and indicated by Stimulability. Pt limited d/t  cognitive status, lack of self-control (impulsivity), self limiting. Pt continues to be functioning below baseline level, and remains limited 2* factors listed above. PT will continue to see pt during current hospitalization in order to address the deficits listed above and provide interventions consistent w/ POC in effort to achieve STGs. PT recommends level 2, moderate resource intensity upon discharge.  Barriers to Discharge: Inaccessible home environment, Decreased caregiver support     Rehab Resource Intensity Level, PT: II (Moderate  Resource Intensity)    See flowsheet documentation for full assessment.

## 2024-06-13 NOTE — PLAN OF CARE
Problem: OCCUPATIONAL THERAPY ADULT  Goal: Performs self-care activities at highest level of function for planned discharge setting.  See evaluation for individualized goals.  Description: Treatment Interventions: Functional transfer training, Endurance training, Patient/family training          See flowsheet documentation for full assessment, interventions and recommendations.   Note: Limitation: Decreased ADL status, Decreased UE strength, Decreased Safe judgement during ADL, Decreased cognition, Decreased endurance, Decreased self-care trans, Decreased high-level ADLs, Decreased fine motor control, Decreased UE ROM  Prognosis: Good  Assessment: Patient participated in Skilled OT session this date with interventions consisting of  therapeutic activities to: increase activity tolerance, increase dynamic sit/ stand balance during functional activity , and increase OOB/ sitting tolerance . Patient agreeable to OT treatment session, upon arrival patient was found supine in bed and in no apparent distress. Patient completed bed mobility with min assist of 2 and functional transfers with mod assist of 2. Pt sat EOB with sup-CGA for sitting balance. Pt completed 2 STS transfers with mod assist of 2 and ambulated to recliner with mod assist of 2 utilizing RW. In comparison to previous session, patient with improvements in bed mobility. Patient requiring verbal cues for correct technique, cognitive assistance to anticipate next step, and one step directives. Patient continues to be functioning below baseline level, occupational performance remains limited secondary to factors listed above and increased risk for falls and injury.   From OT standpoint, recommendation at time of d/c would be Level II (moderate resource intensity).   Patient to benefit from continued Occupational Therapy treatment while in the hospital to address deficits as defined above and maximize level of functional independence with ADLs and functional  mobility.     Rehab Resource Intensity Level, OT: II (Moderate Resource Intensity)        Yanira aLnge OTD, OTR/L

## 2024-06-13 NOTE — CASE MANAGEMENT
Case Management Discharge Planning Note    Patient name Jairon Oconnell  Location ICU 10/ICU 10 MRN [id number]  : 1965 Date 2024       Current Admission Date: 6/3/2024  Current Admission Diagnosis:Cardiac arrest (HCC)   Patient Active Problem List    Diagnosis Date Noted Date Diagnosed    Acute metabolic encephalopathy 2024     Cardiac arrest (HCC) 2024     Dysphagia 2024     Closed traumatic fracture of ribs of right side with pneumothorax 2024     Acute respiratory failure with hypoxia and hypercapnia ([facility]) 2024     Urinary retention 2024     Nondisplaced fracture of greater trochanter of right femur, subsequent encounter for closed fracture with routine healing 2024     Closed nondisplaced fracture of proximal phalanx of left index finger with routine healing, subsequent encounter 10/30/2023     Pressure ulcer of sacral region, stage 3 ([facility]) 2023     Ambulatory dysfunction 2022     Social problem 2021     Hypomagnesemia 2021     Thrombocytopathia ([facility]) 2020     Hyponatremia 2019     Metabolic encephalopathy 2019     Seborrheic dermatitis of scalp 2019     Nearsightedness 2019     Behavior concern in adult 2019     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     Vitamin B12 deficiency 2017     Mixed hyperlipidemia 2016     Vitamin D deficiency 2016     Type 2 diabetes mellitus with diabetic neuropathy, without long-term current use of insulin ([facility]) 06/15/2016     Acquired hypothyroidism 06/15/2016     Cataract 2013     Ataxic cerebral palsy (HCC) 2013     Generalized convulsive epilepsy ([facility]) 2013     Essential hypertension 2013     Osteoporosis 2013     Scoliosis 2013       LOS (days): 10  Geometric Mean LOS (GMLOS) (days): 5.2  Days to GMLOS:-4.6     OBJECTIVE:  Risk of Unplanned Readmission Score: 27.38         Current admission  status: Inpatient   Preferred Pharmacy:   [facility] [place] - [address]  [address]  [place] [address]  Phone: [phone] Fax: [phone]    [id number] [place] - [address]  [place] [address]  Phone: [phone] Fax: [phone]    Primary Care Provider: HUNTER Brown    Primary Insurance: MEDICARE  Secondary Insurance: [place] [employer]    DISCHARGE DETAILS:                                          Other Referral/Resources/Interventions Provided:  Referral Comments: Per rounds w ICU team, pt may need PEG, pleuradesis, new CT.  IR following.  IVF at 50, IV abx cont'd.  CM will continue to follow.         Treatment Team Recommendation: Short Term Rehab, SNF, Home with Home Health Care  Discharge Destination Plan:: Home with Home Health Care  Transport at Discharge : Family

## 2024-06-13 NOTE — PLAN OF CARE
Plan/Recommendations:  Continue puree/pudding thick liquids.  **hold if continuous coughing noted with pudding  Meds crushed  1:1 full feeding assist  Alternate solids and liquids, slow rate of feeding, small bites/sips  Pudding thick liquids by tsp amounts only  Upright 90' for all meals  ST follow-up x1-3

## 2024-06-13 NOTE — CONSULTS
e-Consult (IPC)  - Interventional Radiology  Jairon Oconnell 59 y.o. male MRN: [id number]  Unit/Bed#: ICU 10 Encounter: [id number]      Interventional Radiology has been consulted to evaluate Jairon Oconnell    We were consulted by Critical Care concerning this patient with persistent RIGHT hemothorax.    Inpatient Consult to IR  Consult performed by: HUNTER Corral  Consult ordered by: HUNTER Colby        06/13/24    Assessment/Recommendation:     59 year old male presenting to the emergency room 6/3/2024 in cardiac arrest after choking episode.      PMH seizure disorder, bipolar, HTN, HLD, hypothyroid.    Patient with possible hemothorax status post CPR with 32 F chest tube placement 6/9/2024.  Despite chest tube in place patient with continued persistent pneumohemothorax. Chest tube output over past 24 hours- 230 ml    Interventional radiology has been consulted for patient evaluation of possible chest tube reposition for tPA dornase instillation.    Reviewed diagnostic imaging and laboratory findings   Multidisciplinary discussion with critical care and Dr. Casiano  Recommend CT chest with contrast for better evaluation of lung and effusion to decide optimal tube position  Plan for IR RIGHT chest tube reposition today pending CT chest. Time to be determined per IR availability and pt status  Remainder of care per critical care team   Please do not hesitate to contact IR for concerns/questions    31 + minutes, >50% of the total time devoted to medical consultative verbal/EMR discussion between providers. Written report will be generated in the EMR.     Thank you for allowing Interventional Radiology to participate in the care of Jairon Oconnell.     HUNTER Corral

## 2024-06-13 NOTE — PHYSICAL THERAPY NOTE
"Physical Therapy Treatment Note       06/13/24 0913   PT Last Visit   PT Visit Date 06/13/24   Note Type   Note Type Treatment   Pain Assessment   Pain Assessment Tool FLACC   Pain Rating: FLACC (Rest) - Face 0   Pain Rating: FLACC (Rest) - Legs 0   Pain Rating: FLACC (Rest) - Activity 0   Pain Rating: FLACC (Rest) - Cry 0   Pain Rating: FLACC (Rest) - Consolability 0   Score: FLACC (Rest) 0   Pain Rating: FLACC (Activity) - Face 1   Pain Rating: FLACC (Activity) - Legs 1   Pain Rating: FLACC (Activity) - Activity 1   Pain Rating: FLACC (Activity) - Cry 1   Pain Rating: FLACC (Activity) - Consolability 1   Score: FLACC (Activity) 5   Restrictions/Precautions   Weight Bearing Precautions Per Order Yes   RLE Weight Bearing Per Order WBAT  (with no active hip abduction per chart review; previous PT eval 2/28/24)   Other Precautions Cognitive;Chair Alarm;Bed Alarm;Multiple lines;Telemetry;O2;Fall Risk;Pain  (R chest tube)   General   Chart Reviewed Yes   Response to Previous Treatment Patient unable to report, no changes reported from family or staff   Family/Caregiver Present No   Cognition   Overall Cognitive Status Impaired   Arousal/Participation Alert;Responsive   Attention Attends with cues to redirect   Orientation Level Oriented to person;Disoriented to situation;Disoriented to time;Disoriented to place   Memory Decreased recall of recent events;Decreased short term memory   Following Commands Follows one step commands with increased time or repetition   Comments pt agreeable to PT with maximal encouragement, education on importance of OOB mobility; pt agitated at times, pt did not curse as much this date throughout session   Subjective   Subjective \"no\"   Bed Mobility   Supine to Sit 4  Minimal assistance   Additional items Assist x 2;HOB elevated;Bedrails;Increased time required;Verbal cues;LE management   Transfers   Sit to Stand 3  Moderate assistance   Additional items Assist x 2;Armrests;Increased time " required;Verbal cues   Stand to Sit 3  Moderate assistance   Additional items Assist x 2;Armrests;Increased time required;Verbal cues   Ambulation/Elevation   Gait pattern Decreased foot clearance;Forward Flexion;Shuffling;Short stride;Step to;Excessively slow;Knees flexed;Decreased toe off;Decreased heel strike   Gait Assistance 3  Moderate assist   Additional items Assist x 2;Verbal cues;Tactile cues   Assistive Device Rolling walker   Distance 3' from EOB to recliner   Balance   Static Sitting Fair   Dynamic Sitting Fair -   Static Standing Poor   Dynamic Standing Poor   Ambulatory Poor   Endurance Deficit   Endurance Deficit Yes   Activity Tolerance   Activity Tolerance Patient limited by fatigue;Patient limited by pain;Treatment limited secondary to agitation   Medical Staff Made Aware Co-treat performed with OT secondary to complex medical condition of patient and regression of functional status from baseline. PT/OT goals were addressed separately.   Nurse Made Aware RN Federica   Assessment   Prognosis Fair   Problem List Decreased strength;Decreased endurance;Impaired balance;Decreased mobility;Decreased cognition;Pain   Assessment Pt seen for PT treatment session this date, consisting of ther act focused on bed mobility, transfers, sitting/standing balance/posture facilitation, short gait trial with use of RW from EOB to recliner. Pt requiring min a x2 for bed mobility, mod Ax2 for transfers and gait trial. pt agreeable to PT with maximal encouragement, education on importance of OOB mobility; pt agitated at times, minimal cursing throughout session. Current goals and POC established on IE remain appropriate, pt continues to have rehab potential and is making fair progress towards STGs. Pt prognosis for achieving goals is fair, pending pt progress with hospitalization/medical status improvements, and indicated by Stimulability. Pt limited d/t  cognitive status, lack of self-control (impulsivity), self limiting.  Pt continues to be functioning below baseline level, and remains limited 2* factors listed above. PT will continue to see pt during current hospitalization in order to address the deficits listed above and provide interventions consistent w/ POC in effort to achieve STGs. PT recommends level 2, moderate resource intensity upon discharge.   Barriers to Discharge Inaccessible home environment;Decreased caregiver support   Goals   Patient Goals none expressed   STG Expiration Date 06/15/24   Short Term Goal #1 Additional: Ambulate > 25 ft. with least restrictive assistive device with min A of 1 w/o LOB and w/ normalized gait pattern 100% of the time and Increase ambulatory balance 1/2 grade to decrease risk for falls   PT Treatment Day 2   Plan   Treatment/Interventions Functional transfer training;LE strengthening/ROM;Therapeutic exercise;Endurance training;Cognitive reorientation;Patient/family training;Bed mobility;Continued evaluation;Spoke to nursing;OT   Progress Progressing toward goals   PT Frequency 3-5x/wk   Discharge Recommendation   Rehab Resource Intensity Level, PT II (Moderate Resource Intensity)   AM-PAC Basic Mobility Inpatient   Turning in Flat Bed Without Bedrails 2   Lying on Back to Sitting on Edge of Flat Bed Without Bedrails 2   Moving Bed to Chair 1   Standing Up From Chair Using Arms 1   Walk in Room 1   Climb 3-5 Stairs With Railing 1   Basic Mobility Inpatient Raw Score 8   Turning Head Towards Sound 3   Follow Simple Instructions 2   Low Function Basic Mobility Raw Score  13   Low Function Basic Mobility Standardized Score  20.14   [facility] Highest Level Of Mobility   -HLM Goal 3: Sit at edge of bed   -HLM Achieved 4: Move to chair/commode   Education   Education Provided Mobility training;Assistive device   Patient Reinforcement needed   End of Consult   Patient Position at End of Consult Bedside chair;Bed/Chair alarm activated;All needs within reach       Merlene Reyes, PT, DPT

## 2024-06-13 NOTE — SPEECH THERAPY NOTE
Speech Language/Pathology    Speech/Language Pathology Progress Note    Patient Name: Jairon Oconnell  Today's Date: 6/13/2024     Problem List  Principal Problem:    Cardiac arrest (HCC)  Active Problems:    Generalized convulsive epilepsy (HCC)    Acquired hypothyroidism    Behavior concern in adult    Ambulatory dysfunction    Acute metabolic encephalopathy    Dysphagia    Closed traumatic fracture of ribs of right side with pneumothorax    Acute respiratory failure with hypoxia and hypercapnia ([facility])       Past Medical History  Past Medical History:   Diagnosis Date    ADRIANA (acute kidney injury) ([facility]) 9/24/2019    Bacteremia due to Gram-positive bacteria 9/7/2021    Buttock wound, left, subsequent encounter 11/5/2021    COVID-19     CP (cerebral palsy) ([facility])     Depression     Diabetes ([facility])     Disease of thyroid gland     Gait disorder     Gluteal abscess 9/6/2021    Hyperlipidemia     Hypertension     Kidney failure     Kidney stones     Moderate protein-calorie malnutrition ([facility]) 12/22/2021    Osteoporosis     Pressure injury of left buttock, stage 4 ([facility]) 3/9/2022    Psychiatric disorder     Scoliosis     Seizures ([facility])     Sepsis ([facility]) 9/25/2019    Thyroid disease     UTI (urinary tract infection)         Past Surgical History  Past Surgical History:   Procedure Laterality Date    APPENDECTOMY      IR PICC PLACEMENT SINGLE LUMEN  9/13/2021    IR PICC PLACEMENT SINGLE LUMEN  9/16/2021         Subjective:  Pt received awake and alert in chair. PCA was feeding pt lunchtime meal upon arrival. PCA reports increased coughing with pudding cup.  Objective:  The following consistencies were administered today: Pudding thick vanilla magic cup. Administered clinician controlled tsp amounts of pudding- bolus retrieval and containment were adequate. Oral mastication was prolonged but functional. A-P transfer noted to be quicker. Swallow initiation appeared delayed. Noted coughing x3 with pudding thick. Pt required  suctioning to clear secretions from oral cavity post swallow. Feeding discontinued as per pt's request and increased coughing. No changes in respiratory status or vocal quality noted during meal. O2 saturation at 100% on 6LNC    Assessment:  Pt continues to present with oropharyngeal dysphagia- noted pt will request for more puree/pudding to be administered prior to him swallowing bolus in mouth, suspected related to impulsivity. With clinician controlled tsp of pudding, slow rate of feeding and moderate verbal cueing of triggering swallow, coughing seems to be reduced, especially at beginning of meal. However as meal progresses, coughing seems to worsen despite slower rate of feeding.     Plan/Recommendations:  Continue puree/pudding thick liquids.  **hold if continuous coughing noted with pudding  Meds crushed  1:1 full feeding assist  Alternate solids and liquids, slow rate of feeding, small bites/sips  Pudding thick liquids by tsp amounts only  Upright 90' for all meals  ST follow-up x1-3

## 2024-06-13 NOTE — PROGRESS NOTES
[facility]  Progress Note  Name: Jairon Oconnell I  MRN: [id number]  Unit/Bed#: ICU 10 I Date of Admission: 6/3/2024   Date of Service: 6/13/2024 I Hospital Day: 10    Assessment & Plan   * Cardiac arrest (HCC)  Assessment & Plan  Thought to be secondary to hypoxia secondary to airway obstruction in the setting of chocking  Now post self extubation and at baseline mental status  WIth residual AHRF, 2/2 rib fractures vs aspiration as below  ECHO- EF 55%    Acute respiratory failure with hypoxia and hypercapnia (HCC)  Assessment & Plan  S/p self extubation 6/4  With residual AHRF in setting of rib fractures, likely aspiration  Now on NC oxygen  Wean as able to maintain Spo2 >92%    Closed traumatic fracture of ribs of right side with pneumothorax  Assessment & Plan  S/p 10 Fr right chest tube, discontinued on 6/8  32 F chest tube placed 6/9 due to hemothorax and persistent small pneumothorax  Residual pneumothorax now resolved  Monitor output from chest tube    Dysphagia  Assessment & Plan  Aspiration precautions  Pills crushed in pudding    Acute metabolic encephalopathy  Assessment & Plan  Now s/p self extubation  Appears to be at baseline mental status  Serial neuro checks  CAM ICU  Delirium precautions    Ambulatory dysfunction  Assessment & Plan  PT/OT when appropriate  Fall precautions    Behavior concern in adult  Assessment & Plan  Home seroquel restarted    Acquired hypothyroidism  Assessment & Plan  Continue home levothyroxine    Generalized convulsive epilepsy (HCC)  Assessment & Plan  Continue home medications  Depakote restarted to avoid withdrawal seizures             Disposition: Stepdown Level 1    ICU Core Measures     A: Assess, Prevent, and Manage Pain Has pain been assessed? Yes  Need for changes to pain regimen? No   B: Both SAT/SAT  N/A   C: Choice of Sedation RASS Goal: N/A patient not on sedation  Need for changes to sedation or analgesia regimen? No   D: Delirium  CAM-ICU: Unable to perform secondary to Dementia or other chronic cognitive dysfunction   E: Early Mobility  Plan for early mobility? Yes   F: Family Engagement Plan for family engagement today? Yes       Antibiotic Review: Awaiting culture results.     Review of Invasive Devices:            Prophylaxis:  VTE VTE covered by:  enoxaparin, Subcutaneous, 40 mg at 24 0902       Stress Ulcer  not ordered         Significant 24hr Events     24hr events: Small residual apical pneumothorax is now resolved. No overnight events.     Subjective   Review of Systems: Review of Systems   Unable to perform ROS: Other        Objective                            Vitals I/O      Most Recent Min/Max in 24hrs   Temp 98.3 °F (36.8 °C) Temp  Min: 98.3 °F (36.8 °C)  Max: 98.5 °F (36.9 °C)   Pulse 89 Pulse  Min: 84  Max: 100   Resp 15 Resp  Min: 12  Max: 23   /66 BP  Min: 140/65  Max: 164/71   O2 Sat 100 % SpO2  Min: 91 %  Max: 100 %      Intake/Output Summary (Last 24 hours) at 2024 0518  Last data filed at 2024 0400  Gross per 24 hour   Intake 851.67 ml   Output 1170 ml   Net -318.33 ml       Diet Dysphagia/Modified Consistency; Dysphagia 1-Pureed; Pudding Thick Liquid; Honey Thick Liquid    Invasive Monitoring           Physical Exam   Physical Exam  Vitals reviewed.   Skin:     General: Skin is warm and dry.   HENT:      Head: Normocephalic and atraumatic.   Cardiovascular:      Rate and Rhythm: Normal rate and regular rhythm.      Pulses: Normal pulses.      Heart sounds: Normal heart sounds.   Musculoskeletal:         General: Normal range of motion.      Right lower le+ Edema present.      Left lower le+ Edema present.   Abdominal:      Palpations: Abdomen is soft.   Constitutional:       Appearance: He is ill-appearing.   Pulmonary:      Effort: Pulmonary effort is normal.      Comments: Coarse bilateral breath sounds  Chest:      Comments: Right sided chest tube  Neurological:      General: No focal  deficit present.      Mental Status: He is alert. Mental status is at baseline.            Diagnostic Studies      EKG: Sinus rhythm on telemetry  Imaging:  I have personally reviewed pertinent reports.       Medications:  Scheduled PRN   acetaminophen, 975 mg, Q8H RONALD  cefTRIAXone, 1,000 mg, Q24H  chlorhexidine, 15 mL, Q12H RONALD  divalproex sodium, 500 mg, Q8H RONALD  enoxaparin, 40 mg, Q24H RONALD  gabapentin, 400 mg, TID  lacosamide, 150 mg, QAM  lacosamide, 200 mg, HS  levETIRAcetam, 1,500 mg, Q12H RONALD  levothyroxine, 150 mcg, Daily  lidocaine, 2 patch, Daily  loratadine, 10 mg, Daily  polyethylene glycol, 17 g, Daily  pravastatin, 80 mg, Daily With Dinner  QUEtiapine, 150 mg, BID  QUEtiapine, 400 mg, HS  senna-docusate sodium, 2 tablet, BID  zonisamide, 300 mg, HS      ondansetron, 4 mg, Q6H PRN  oxyCODONE, 2.5 mg, Q6H PRN   Or  oxyCODONE, 5 mg, Q6H PRN       Continuous    dextrose, 50 mL/hr, Last Rate: 50 mL/hr (06/12/24 1058)         Labs:    CBC    Recent Labs     06/12/24  0539   WBC 10.16   HGB 7.1*   HCT 22.4*        BMP    Recent Labs     06/12/24  0539   SODIUM 151*   K 3.5   *   CO2 35*   AGAP 6   BUN 24   CREATININE 0.57*   CALCIUM 8.9       Coags    No recent results     Additional Electrolytes  Recent Labs     06/12/24  0539   MG 1.7*   PHOS 3.7   CAIONIZED 1.16          Blood Gas    No recent results  No recent results LFTs  No recent results      Infectious  No recent results    Glucose  Recent Labs     06/12/24  0539   GLUC 155*               HUNTER Pascual

## 2024-06-13 NOTE — CASE MANAGEMENT
Case Management Discharge Planning Note    Patient name Jairon Oconnell  Location ICU 10/ICU 10 MRN [id number]  : 1965 Date 2024       Current Admission Date: 6/3/2024  Current Admission Diagnosis:Cardiac arrest (HCC)   Patient Active Problem List    Diagnosis Date Noted Date Diagnosed    Acute metabolic encephalopathy 2024     Cardiac arrest (HCC) 2024     Dysphagia 2024     Closed traumatic fracture of ribs of right side with pneumothorax 2024     Acute respiratory failure with hypoxia and hypercapnia ([facility]) 2024     Urinary retention 2024     Nondisplaced fracture of greater trochanter of right femur, subsequent encounter for closed fracture with routine healing 2024     Closed nondisplaced fracture of proximal phalanx of left index finger with routine healing, subsequent encounter 10/30/2023     Pressure ulcer of sacral region, stage 3 ([facility]) 2023     Ambulatory dysfunction 2022     Social problem 2021     Hypomagnesemia 2021     Thrombocytopathia ([facility]) 2020     Hyponatremia 2019     Metabolic encephalopathy 2019     Seborrheic dermatitis of scalp 2019     Nearsightedness 2019     Behavior concern in adult 2019     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     Vitamin B12 deficiency 2017     Mixed hyperlipidemia 2016     Vitamin D deficiency 2016     Type 2 diabetes mellitus with diabetic neuropathy, without long-term current use of insulin ([facility]) 06/15/2016     Acquired hypothyroidism 06/15/2016     Cataract 2013     Ataxic cerebral palsy (HCC) 2013     Generalized convulsive epilepsy ([facility]) 2013     Essential hypertension 2013     Osteoporosis 2013     Scoliosis 2013       LOS (days): 10  Geometric Mean LOS (GMLOS) (days): 5.2  Days to GMLOS:-4.4     OBJECTIVE:  Risk of Unplanned Readmission Score: 27.32         Current admission  status: Inpatient   Preferred Pharmacy:   [facility] [place] - [address]  [address]  [place] [address]  Phone: [phone] Fax: [phone]    [id number] [place] [address]  [place] [address]  Phone: [phone] Fax: [phone]    Primary Care Provider: HUNTER Brown    Primary Insurance: MEDICARE  Secondary Insurance: [place] [employer]    DISCHARGE DETAILS:                                          Other Referral/Resources/Interventions Provided:  Referral Comments: Spoke w owner of [employer] Nathalia Drummond;  she reports there is no written medical POA for pt, and pt's sister should be used as decision maker.  She is described as supportive and involved.    Sister is Kemi Decker (Sister)  [phone] (Home Phone).

## 2024-06-13 NOTE — OCCUPATIONAL THERAPY NOTE
"  Occupational Therapy Treatment Note     Patient Name: Jairon Oconnell  Today's Date: 6/13/2024  Problem List  Principal Problem:    Cardiac arrest (HCC)  Active Problems:    Generalized convulsive epilepsy (HCC)    Acquired hypothyroidism    Behavior concern in adult    Ambulatory dysfunction    Acute metabolic encephalopathy    Dysphagia    Closed traumatic fracture of ribs of right side with pneumothorax    Acute respiratory failure with hypoxia and hypercapnia (HCC)        06/13/24 0912   OT Last Visit   OT Visit Date 06/13/24   Note Type   Note Type Treatment   Pain Assessment   Pain Assessment Tool FLACC   Pain Score   (pt shook his head \"no\" to pain)   Pain Rating: FLACC (Rest) - Face 0   Pain Rating: FLACC (Rest) - Legs 0   Pain Rating: FLACC (Rest) - Activity 0   Pain Rating: FLACC (Rest) - Cry 0   Pain Rating: FLACC (Rest) - Consolability 0   Score: FLACC (Rest) 0   Pain Rating: FLACC (Activity) - Face 1   Pain Rating: FLACC (Activity) - Legs 1   Pain Rating: FLACC (Activity) - Activity 1   Pain Rating: FLACC (Activity) - Cry 1   Pain Rating: FLACC (Activity) - Consolability 1   Score: FLACC (Activity) 5   Restrictions/Precautions   Weight Bearing Precautions Per Order Yes   RLE Weight Bearing Per Order WBAT   Other Precautions Cognitive;Chair Alarm;Bed Alarm;Multiple lines;O2;Fall Risk;Pain  (Chest tube)   Lifestyle   Autonomy Per chart review, patient requires assistance with ADLs/IADLs and lives in a two story house with 1st floor set-up   Reciprocal Relationships Caregiver and Sister   Service to Others Retired   Bed Mobility   Supine to Sit 4  Minimal assistance   Additional items Assist x 2;HOB elevated;Bedrails;Increased time required;Verbal cues;LE management   Sit to Supine   (DNT: pt seated OOB in recliner at end of session)   Transfers   Sit to Stand 3  Moderate assistance   Additional items Assist x 2;Increased time required;Verbal cues;Armrests   Stand to Sit 3  Moderate assistance "   Additional items Assist x 2;Armrests;Increased time required;Verbal cues   Additional Comments Pt completed 2 STS transfers from EOB during session. On 1st attempt, pt impulsively returned self BTB. On 2nd attempt, pt progressed to ambulating to recliner with max verbal cues.   Functional Mobility   Functional Mobility 3  Moderate assistance  (Assist of 2)   Additional Comments Pt ambulated short distance to recliner. Pt unsteady and requires assist to manage RW.   Additional items Rolling walker   Cognition   Overall Cognitive Status Impaired   Arousal/Participation Alert;Responsive;Cooperative   Attention Attends with cues to redirect   Orientation Level Oriented to person;Disoriented to situation;Disoriented to time;Disoriented to place  (pt verbalized birthday with cues)   Memory Decreased recall of recent events;Decreased short term memory   Following Commands Follows one step commands with increased time or repetition   Comments Pt agreeable to OT session. Pt requires encouragement for participation   Activity Tolerance   Activity Tolerance Patient limited by pain   Medical Staff Made Aware This session, pt required and most appropriately benefited from skilled OT/PT co-treat due to extensive physical assistance of two therapists, significant regression from functional baseline and decreased activity tolerance.   Assessment   Assessment Patient participated in Skilled OT session this date with interventions consisting of  therapeutic activities to: increase activity tolerance, increase dynamic sit/ stand balance during functional activity , and increase OOB/ sitting tolerance . Patient agreeable to OT treatment session, upon arrival patient was found supine in bed and in no apparent distress. Patient completed bed mobility with min assist of 2 and functional transfers with mod assist of 2. Pt sat EOB with sup-CGA for sitting balance. Pt completed 2 STS transfers with mod assist of 2 and ambulated to recliner  with mod assist of 2 utilizing RW. In comparison to previous session, patient with improvements in bed mobility. Patient requiring verbal cues for correct technique, cognitive assistance to anticipate next step, and one step directives. Patient continues to be functioning below baseline level, occupational performance remains limited secondary to factors listed above and increased risk for falls and injury.   From OT standpoint, recommendation at time of d/c would be Level II (moderate resource intensity).   Patient to benefit from continued Occupational Therapy treatment while in the hospital to address deficits as defined above and maximize level of functional independence with ADLs and functional mobility.   Plan   Treatment Interventions Functional transfer training;Endurance training;Patient/family training   Goal Expiration Date 06/20/24   OT Treatment Day 2   OT Frequency 3-5x/wk   Discharge Recommendation   Rehab Resource Intensity Level, OT II (Moderate Resource Intensity)   AM-PAC Daily Activity Inpatient   Lower Body Dressing 1   Bathing 1   Toileting 1   Upper Body Dressing 1   Grooming 2   Eating 2   Daily Activity Raw Score 8   Turning Head Towards Sound 3   Follow Simple Instructions 2   Low Function Daily Activity Raw Score 13   Low Function Daily Activity Standardized Score  23.16   AM-PAC Applied Cognition Inpatient   Following a Speech/Presentation 2   Understanding Ordinary Conversation 3   Taking Medications 1   Remembering Where Things Are Placed or Put Away 1   Remembering List of 4-5 Errands 1   Taking Care of Complicated Tasks 1   Applied Cognition Raw Score 9   Applied Cognition Standardized Score 22.48   Modified [place] Scale   Modified Gui Scale 4   End of Consult   Patient Position at End of Consult Bedside chair;Bed/Chair alarm activated;All needs within reach   Nurse Communication Nurse aware of consult     Yanira Lange OTD, OTR/L

## 2024-06-14 ENCOUNTER — APPOINTMENT (OUTPATIENT)
Dept: NON INVASIVE DIAGNOSTICS | Facility: HOSPITAL | Age: 59
DRG: 208 | End: 2024-06-14
Payer: MEDICARE

## 2024-06-14 PROBLEM — E11.9 DM (DIABETES MELLITUS) (HCC): Status: ACTIVE | Noted: 2024-06-14

## 2024-06-14 PROBLEM — G93.41 ACUTE METABOLIC ENCEPHALOPATHY: Status: RESOLVED | Noted: 2024-06-03 | Resolved: 2024-06-14

## 2024-06-14 PROBLEM — E87.0 HYPERNATREMIA: Status: ACTIVE | Noted: 2024-06-14

## 2024-06-14 LAB
ALBUMIN SERPL BCG-MCNC: 3.1 G/DL (ref 3.5–5)
ALP SERPL-CCNC: 89 U/L (ref 34–104)
ALT SERPL W P-5'-P-CCNC: 59 U/L (ref 7–52)
ANION GAP SERPL CALCULATED.3IONS-SCNC: 6 MMOL/L (ref 4–13)
AST SERPL W P-5'-P-CCNC: 36 U/L (ref 13–39)
BASOPHILS # BLD AUTO: 0.06 THOUSANDS/ÂΜL (ref 0–0.1)
BASOPHILS NFR BLD AUTO: 1 % (ref 0–1)
BILIRUB SERPL-MCNC: 0.3 MG/DL (ref 0.2–1)
BUN SERPL-MCNC: 16 MG/DL (ref 5–25)
CA-I BLD-SCNC: 1.18 MMOL/L (ref 1.12–1.32)
CALCIUM ALBUM COR SERPL-MCNC: 9.6 MG/DL (ref 8.3–10.1)
CALCIUM SERPL-MCNC: 8.9 MG/DL (ref 8.4–10.2)
CHLORIDE SERPL-SCNC: 106 MMOL/L (ref 96–108)
CO2 SERPL-SCNC: 31 MMOL/L (ref 21–32)
CREAT SERPL-MCNC: 0.47 MG/DL (ref 0.6–1.3)
EOSINOPHIL # BLD AUTO: 0.47 THOUSAND/ÂΜL (ref 0–0.61)
EOSINOPHIL NFR BLD AUTO: 4 % (ref 0–6)
ERYTHROCYTE [DISTWIDTH] IN BLOOD BY AUTOMATED COUNT: 14 % (ref 11.6–15.1)
EST. AVERAGE GLUCOSE BLD GHB EST-MCNC: 146 MG/DL
GFR SERPL CREATININE-BSD FRML MDRD: 123 ML/MIN/1.73SQ M
GLUCOSE SERPL-MCNC: 131 MG/DL (ref 65–140)
GLUCOSE SERPL-MCNC: 146 MG/DL (ref 65–140)
GLUCOSE SERPL-MCNC: 164 MG/DL (ref 65–140)
GLUCOSE SERPL-MCNC: 171 MG/DL (ref 65–140)
GLUCOSE SERPL-MCNC: 179 MG/DL (ref 65–140)
HBA1C MFR BLD: 6.7 %
HCT VFR BLD AUTO: 22.8 % (ref 36.5–49.3)
HGB BLD-MCNC: 7.5 G/DL (ref 12–17)
IMM GRANULOCYTES # BLD AUTO: 0.21 THOUSAND/UL (ref 0–0.2)
IMM GRANULOCYTES NFR BLD AUTO: 2 % (ref 0–2)
LYMPHOCYTES # BLD AUTO: 2.3 THOUSANDS/ÂΜL (ref 0.6–4.47)
LYMPHOCYTES NFR BLD AUTO: 19 % (ref 14–44)
MAGNESIUM SERPL-MCNC: 1.4 MG/DL (ref 1.9–2.7)
MCH RBC QN AUTO: 32.6 PG (ref 26.8–34.3)
MCHC RBC AUTO-ENTMCNC: 32.9 G/DL (ref 31.4–37.4)
MCV RBC AUTO: 99 FL (ref 82–98)
MONOCYTES # BLD AUTO: 0.8 THOUSAND/ÂΜL (ref 0.17–1.22)
MONOCYTES NFR BLD AUTO: 7 % (ref 4–12)
NEUTROPHILS # BLD AUTO: 8.35 THOUSANDS/ÂΜL (ref 1.85–7.62)
NEUTS SEG NFR BLD AUTO: 67 % (ref 43–75)
NRBC BLD AUTO-RTO: 0 /100 WBCS
PHOSPHATE SERPL-MCNC: 3.6 MG/DL (ref 2.7–4.5)
PLATELET # BLD AUTO: 353 THOUSANDS/UL (ref 149–390)
PMV BLD AUTO: 9.8 FL (ref 8.9–12.7)
POTASSIUM SERPL-SCNC: 4.1 MMOL/L (ref 3.5–5.3)
PROCALCITONIN SERPL-MCNC: 0.13 NG/ML
PROT SERPL-MCNC: 6.2 G/DL (ref 6.4–8.4)
RBC # BLD AUTO: 2.3 MILLION/UL (ref 3.88–5.62)
SODIUM SERPL-SCNC: 143 MMOL/L (ref 135–147)
WBC # BLD AUTO: 12.19 THOUSAND/UL (ref 4.31–10.16)

## 2024-06-14 PROCEDURE — 99024 POSTOP FOLLOW-UP VISIT: CPT | Performed by: RADIOLOGY

## 2024-06-14 PROCEDURE — 85025 COMPLETE CBC W/AUTO DIFF WBC: CPT

## 2024-06-14 PROCEDURE — 83735 ASSAY OF MAGNESIUM: CPT

## 2024-06-14 PROCEDURE — 0W9930Z DRAINAGE OF RIGHT PLEURAL CAVITY WITH DRAINAGE DEVICE, PERCUTANEOUS APPROACH: ICD-10-PCS | Performed by: RADIOLOGY

## 2024-06-14 PROCEDURE — NC001 PR NO CHARGE: Performed by: RADIOLOGY

## 2024-06-14 PROCEDURE — A7041 WATER SEAL DRAIN CONTAINER: HCPCS

## 2024-06-14 PROCEDURE — 84145 PROCALCITONIN (PCT): CPT

## 2024-06-14 PROCEDURE — 82948 REAGENT STRIP/BLOOD GLUCOSE: CPT

## 2024-06-14 PROCEDURE — 80053 COMPREHEN METABOLIC PANEL: CPT

## 2024-06-14 PROCEDURE — 99233 SBSQ HOSP IP/OBS HIGH 50: CPT | Performed by: ANESTHESIOLOGY

## 2024-06-14 PROCEDURE — 32557 INSERT CATH PLEURA W/ IMAGE: CPT | Performed by: RADIOLOGY

## 2024-06-14 PROCEDURE — 83036 HEMOGLOBIN GLYCOSYLATED A1C: CPT

## 2024-06-14 PROCEDURE — 84100 ASSAY OF PHOSPHORUS: CPT

## 2024-06-14 PROCEDURE — 82330 ASSAY OF CALCIUM: CPT

## 2024-06-14 RX ORDER — MAGNESIUM SULFATE HEPTAHYDRATE 40 MG/ML
2 INJECTION, SOLUTION INTRAVENOUS ONCE
Status: COMPLETED | OUTPATIENT
Start: 2024-06-14 | End: 2024-06-14

## 2024-06-14 RX ORDER — FENTANYL CITRATE 50 UG/ML
INJECTION, SOLUTION INTRAMUSCULAR; INTRAVENOUS AS NEEDED
Status: COMPLETED | OUTPATIENT
Start: 2024-06-14 | End: 2024-06-14

## 2024-06-14 RX ORDER — LIDOCAINE WITH 8.4% SOD BICARB 0.9%(10ML)
SYRINGE (ML) INJECTION AS NEEDED
Status: COMPLETED | OUTPATIENT
Start: 2024-06-14 | End: 2024-06-14

## 2024-06-14 RX ORDER — MIDAZOLAM HYDROCHLORIDE 2 MG/2ML
INJECTION, SOLUTION INTRAMUSCULAR; INTRAVENOUS AS NEEDED
Status: COMPLETED | OUTPATIENT
Start: 2024-06-14 | End: 2024-06-14

## 2024-06-14 RX ORDER — DEXTROSE, SODIUM CHLORIDE, SODIUM LACTATE, POTASSIUM CHLORIDE, AND CALCIUM CHLORIDE 5; .6; .31; .03; .02 G/100ML; G/100ML; G/100ML; G/100ML; G/100ML
75 INJECTION, SOLUTION INTRAVENOUS CONTINUOUS
Status: DISCONTINUED | OUTPATIENT
Start: 2024-06-14 | End: 2024-06-14

## 2024-06-14 RX ORDER — DEXTROSE, SODIUM CHLORIDE, SODIUM LACTATE, POTASSIUM CHLORIDE, AND CALCIUM CHLORIDE 5; .6; .31; .03; .02 G/100ML; G/100ML; G/100ML; G/100ML; G/100ML
50 INJECTION, SOLUTION INTRAVENOUS CONTINUOUS
Status: DISCONTINUED | OUTPATIENT
Start: 2024-06-14 | End: 2024-06-14

## 2024-06-14 RX ORDER — INSULIN LISPRO 100 [IU]/ML
1-5 INJECTION, SOLUTION INTRAVENOUS; SUBCUTANEOUS EVERY 6 HOURS SCHEDULED
Status: DISCONTINUED | OUTPATIENT
Start: 2024-06-14 | End: 2024-06-14

## 2024-06-14 RX ADMIN — GABAPENTIN 400 MG: 400 CAPSULE ORAL at 21:19

## 2024-06-14 RX ADMIN — SENNOSIDES AND DOCUSATE SODIUM 2 TABLET: 50; 8.6 TABLET ORAL at 08:39

## 2024-06-14 RX ADMIN — GABAPENTIN 400 MG: 400 CAPSULE ORAL at 08:38

## 2024-06-14 RX ADMIN — DIVALPROEX SODIUM 500 MG: 125 CAPSULE ORAL at 21:17

## 2024-06-14 RX ADMIN — ACETAMINOPHEN 975 MG: 325 TABLET, FILM COATED ORAL at 21:18

## 2024-06-14 RX ADMIN — DEXTROSE 50 ML/HR: 5 SOLUTION INTRAVENOUS at 04:11

## 2024-06-14 RX ADMIN — DIVALPROEX SODIUM 500 MG: 125 CAPSULE ORAL at 14:40

## 2024-06-14 RX ADMIN — INSULIN LISPRO 1 UNITS: 100 INJECTION, SOLUTION INTRAVENOUS; SUBCUTANEOUS at 06:10

## 2024-06-14 RX ADMIN — DEXTROSE, SODIUM CHLORIDE, SODIUM LACTATE, POTASSIUM CHLORIDE, AND CALCIUM CHLORIDE 50 ML/HR: 5; .6; .31; .03; .02 INJECTION, SOLUTION INTRAVENOUS at 08:26

## 2024-06-14 RX ADMIN — MIDAZOLAM HYDROCHLORIDE 1 MG: 1 INJECTION, SOLUTION INTRAMUSCULAR; INTRAVENOUS at 11:14

## 2024-06-14 RX ADMIN — GABAPENTIN 400 MG: 400 CAPSULE ORAL at 17:42

## 2024-06-14 RX ADMIN — QUETIAPINE FUMARATE 150 MG: 100 TABLET ORAL at 17:43

## 2024-06-14 RX ADMIN — FENTANYL CITRATE 50 MCG: 50 INJECTION, SOLUTION INTRAMUSCULAR; INTRAVENOUS at 11:14

## 2024-06-14 RX ADMIN — LACOSAMIDE 150 MG: 100 TABLET, FILM COATED ORAL at 08:38

## 2024-06-14 RX ADMIN — ACETAMINOPHEN 975 MG: 325 TABLET, FILM COATED ORAL at 05:37

## 2024-06-14 RX ADMIN — PRAVASTATIN SODIUM 80 MG: 80 TABLET ORAL at 17:43

## 2024-06-14 RX ADMIN — Medication 5 ML: at 11:14

## 2024-06-14 RX ADMIN — LACOSAMIDE 200 MG: 100 TABLET, FILM COATED ORAL at 21:18

## 2024-06-14 RX ADMIN — SENNOSIDES AND DOCUSATE SODIUM 2 TABLET: 50; 8.6 TABLET ORAL at 17:42

## 2024-06-14 RX ADMIN — FENTANYL CITRATE 50 MCG: 50 INJECTION, SOLUTION INTRAMUSCULAR; INTRAVENOUS at 11:10

## 2024-06-14 RX ADMIN — LEVETIRACETAM 1500 MG: 500 TABLET, FILM COATED ORAL at 21:19

## 2024-06-14 RX ADMIN — QUETIAPINE FUMARATE 400 MG: 100 TABLET ORAL at 21:18

## 2024-06-14 RX ADMIN — POLYETHYLENE GLYCOL 3350 17 G: 17 POWDER, FOR SOLUTION ORAL at 08:36

## 2024-06-14 RX ADMIN — CHLORHEXIDINE GLUCONATE 0.12% ORAL RINSE 15 ML: 1.2 LIQUID ORAL at 08:37

## 2024-06-14 RX ADMIN — CHLORHEXIDINE GLUCONATE 0.12% ORAL RINSE 15 ML: 1.2 LIQUID ORAL at 21:18

## 2024-06-14 RX ADMIN — ZONISAMIDE 300 MG: 100 CAPSULE ORAL at 21:19

## 2024-06-14 RX ADMIN — LORATADINE 10 MG: 10 TABLET ORAL at 08:39

## 2024-06-14 RX ADMIN — MAGNESIUM SULFATE HEPTAHYDRATE 2 G: 40 INJECTION, SOLUTION INTRAVENOUS at 08:27

## 2024-06-14 RX ADMIN — LEVETIRACETAM 1500 MG: 500 TABLET, FILM COATED ORAL at 08:37

## 2024-06-14 RX ADMIN — LIDOCAINE 5% 2 PATCH: 700 PATCH TOPICAL at 08:39

## 2024-06-14 RX ADMIN — ACETAMINOPHEN 975 MG: 325 TABLET, FILM COATED ORAL at 14:40

## 2024-06-14 RX ADMIN — ENOXAPARIN SODIUM 40 MG: 40 INJECTION SUBCUTANEOUS at 08:32

## 2024-06-14 RX ADMIN — QUETIAPINE FUMARATE 150 MG: 100 TABLET ORAL at 08:38

## 2024-06-14 RX ADMIN — MIDAZOLAM HYDROCHLORIDE 1 MG: 1 INJECTION, SOLUTION INTRAMUSCULAR; INTRAVENOUS at 11:10

## 2024-06-14 RX ADMIN — DIVALPROEX SODIUM 500 MG: 125 CAPSULE ORAL at 05:37

## 2024-06-14 RX ADMIN — LEVOTHYROXINE SODIUM 150 MCG: 0.15 TABLET ORAL at 08:39

## 2024-06-14 NOTE — CASE MANAGEMENT
Case Management Discharge Planning Note    Patient name Jairon Oconnell  Location ICU 10/ICU 10 MRN [id number]  : 1965 Date 2024       Current Admission Date: 6/3/2024  Current Admission Diagnosis:Cardiac arrest ([facility])   Patient Active Problem List    Diagnosis Date Noted Date Diagnosed    DM (diabetes mellitus) ([facility]) 2024     Hypernatremia 2024     Cardiac arrest ([facility]) 2024     Dysphagia 2024     Closed traumatic fracture of ribs of right side with pneumothorax 2024     Acute respiratory failure with hypoxia and hypercapnia ([facility]) 2024     Urinary retention 2024     Nondisplaced fracture of greater trochanter of right femur, subsequent encounter for closed fracture with routine healing 2024     Closed nondisplaced fracture of proximal phalanx of left index finger with routine healing, subsequent encounter 10/30/2023     Pressure ulcer of sacral region, stage 3 ([facility]) 2023     Ambulatory dysfunction 2022     Social problem 2021     Hypomagnesemia 2021     Thrombocytopathia ([facility]) 2020     Hyponatremia 2019     Metabolic encephalopathy 2019     Seborrheic dermatitis of scalp 2019     Nearsightedness 2019     Behavior concern in adult 2019     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     Vitamin B12 deficiency 2017     HLD (hyperlipidemia) 2016     Vitamin D deficiency 2016     Type 2 diabetes mellitus with diabetic neuropathy, without long-term current use of insulin ([facility]) 06/15/2016     Acquired hypothyroidism 06/15/2016     Cataract 2013     Ataxic cerebral palsy (HCC) 2013     Generalized convulsive epilepsy ([facility]) 2013     HTN (hypertension) 2013     Osteoporosis 2013     Scoliosis 2013       LOS (days): 11  Geometric Mean LOS (GMLOS) (days): 5.2  Days to GMLOS:-5.5     OBJECTIVE:  Risk of Unplanned Readmission Score: 28.66          Current admission status: Inpatient   Preferred Pharmacy:   [facility] - [place] - [address]  [address] [place] [address]  Phone: [phone] Fax: [phone]    [id number] - [place] - [address]  [address]  [place] [place] [address]  Phone: [phone] Fax: [phone]    Primary Care Provider: HUNTER Brown    Primary Insurance: MEDICARE  Secondary Insurance: [place] [employer]    DISCHARGE DETAILS:                                          Other Referral/Resources/Interventions Provided:  Referral Comments: Per ICU rounds today, pt is requiring a new CT.  Confirmed w MD that there is no POA, and sister would act as decision maker under .  CM will continue to follow.  Home care agency/[facility] has been reserved.         Treatment Team Recommendation: Home with Home Health Care, SNF, Short Term Rehab  Discharge Destination Plan:: Home with Home Health Care  Transport at Discharge : Family

## 2024-06-14 NOTE — ASSESSMENT & PLAN NOTE
Lab Results   Component Value Date    HGBA1C 5.5 11/27/2023       Recent Labs     06/13/24 2113 06/14/24  0038   POCGLU 182* 171*       Blood Sugar Average: Last 72 hrs:  (P) 176.5    Holding home metformin  Continue sliding scale insulin every 6 while NPO

## 2024-06-14 NOTE — PROGRESS NOTES
Patient arrived to IR for image guided chest tube placement    The procedure and risks were discussed with the patient's foster sister, Alena Decker  [phone].  All questions were answered. Informed consent was obtained.

## 2024-06-14 NOTE — ASSESSMENT & PLAN NOTE
S/p 10 Fr right chest tube, discontinued on 6/8  32 F chest tube placed 6/9 due to hemothorax and persistent small pneumothorax  IR consulted for pigtail placement   Plan for TPA Dornase instillation in large bore chest tube and possible removal  Chest tube clamped overnight   Monitor output from chest tube

## 2024-06-14 NOTE — ASSESSMENT & PLAN NOTE
S/p self extubation 6/4  With residual AHRF in setting of rib fractures, likely aspiration  Continue Rocephin  Continue Dysphagia diet/aspiration precautions  Now on NC oxygen  Wean as able to maintain Spo2 >92%

## 2024-06-14 NOTE — BRIEF OP NOTE (RAD/CATH)
INTERVENTIONAL RADIOLOGY PROCEDURE NOTE    Date: 6/14/2024    Procedure: Image guided chest tube placement  Procedure Summary       Date:  Room / Location:     Anesthesia Start:  Anesthesia Stop:     Procedure:  Diagnosis:     Scheduled Providers:  Responsible Provider:     Anesthesia Type: Not recorded ASA Status: Not recorded            Preoperative diagnosis:   1. Cardiopulmonary arrest (HCC)    2. Aspiration of gastric contents into trachea    3. Hypothermia, initial encounter    4. Cardiac arrest (HCC)    5. Acute respiratory failure with hypoxia and hypercapnia (HCC)    6. Pleural effusion         Postoperative diagnosis: Same.    Surgeon: Lul Hollis DO     Assistant: None. No qualified resident was available.    Blood loss: minimal    Specimens: bloody pleural fluid evacuated     Findings: 14 [place] chest tube placed with ultrasound guidance    Complications: None immediate.    Anesthesia: local and conscious sedation

## 2024-06-14 NOTE — ASSESSMENT & PLAN NOTE
Aspiration precautions  Dysphagia 1 pureed/pudding thick, honey thick liquids  Appreciate speech recommendations

## 2024-06-14 NOTE — ASSESSMENT & PLAN NOTE
Thought to be secondary to hypoxia secondary to airway obstruction in the setting of chocking  Now post self extubation 6/4 and at baseline mental status  WIth residual AHRF, 2/2 rib fractures vs aspiration as below  ECHO- EF 55%

## 2024-06-14 NOTE — PLAN OF CARE
Problem: PAIN - ADULT  Goal: Verbalizes/displays adequate comfort level or baseline comfort level  Description: Interventions:  - Encourage patient to monitor pain and request assistance  - Assess pain using appropriate pain scale  - Administer analgesics based on type and severity of pain and evaluate response  - Implement non-pharmacological measures as appropriate and evaluate response  - Consider cultural and social influences on pain and pain management  - Notify physician/advanced practitioner if interventions unsuccessful or patient reports new pain  Outcome: Progressing     Problem: INFECTION - ADULT  Goal: Absence or prevention of progression during hospitalization  Description: INTERVENTIONS:  - Assess and monitor for signs and symptoms of infection  - Monitor lab/diagnostic results  - Monitor all insertion sites, i.e. indwelling lines, tubes, and drains  - Monitor endotracheal if appropriate and nasal secretions for changes in amount and color  - [place] appropriate cooling/warming therapies per order  - Administer medications as ordered  - Instruct and encourage patient and family to use good hand hygiene technique  - Identify and instruct in appropriate isolation precautions for identified infection/condition  Outcome: Progressing     Problem: DISCHARGE PLANNING  Goal: Discharge to home or other facility with appropriate resources  Description: INTERVENTIONS:  - Identify barriers to discharge w/patient and caregiver  - Arrange for needed discharge resources and transportation as appropriate  - Identify discharge learning needs (meds, wound care, etc.)  - Arrange for interpretive services to assist at discharge as needed  - Refer to Case Management Department for coordinating discharge planning if the patient needs post-hospital services based on physician/advanced practitioner order or complex needs related to functional status, cognitive ability, or social support system  Outcome: Progressing

## 2024-06-15 ENCOUNTER — APPOINTMENT (INPATIENT)
Dept: RADIOLOGY | Facility: HOSPITAL | Age: 59
DRG: 208 | End: 2024-06-15
Payer: MEDICARE

## 2024-06-15 PROBLEM — E87.0 HYPERNATREMIA: Status: RESOLVED | Noted: 2024-06-14 | Resolved: 2024-06-15

## 2024-06-15 LAB
ANION GAP SERPL CALCULATED.3IONS-SCNC: 7 MMOL/L (ref 4–13)
BUN SERPL-MCNC: 18 MG/DL (ref 5–25)
CALCIUM SERPL-MCNC: 9.1 MG/DL (ref 8.4–10.2)
CHLORIDE SERPL-SCNC: 104 MMOL/L (ref 96–108)
CO2 SERPL-SCNC: 28 MMOL/L (ref 21–32)
CREAT SERPL-MCNC: 0.57 MG/DL (ref 0.6–1.3)
ERYTHROCYTE [DISTWIDTH] IN BLOOD BY AUTOMATED COUNT: 13.8 % (ref 11.6–15.1)
GFR SERPL CREATININE-BSD FRML MDRD: 113 ML/MIN/1.73SQ M
GLUCOSE SERPL-MCNC: 152 MG/DL (ref 65–140)
HCT VFR BLD AUTO: 27.5 % (ref 36.5–49.3)
HGB BLD-MCNC: 8.8 G/DL (ref 12–17)
MAGNESIUM SERPL-MCNC: 1.6 MG/DL (ref 1.9–2.7)
MCH RBC QN AUTO: 31.8 PG (ref 26.8–34.3)
MCHC RBC AUTO-ENTMCNC: 32 G/DL (ref 31.4–37.4)
MCV RBC AUTO: 99 FL (ref 82–98)
PHOSPHATE SERPL-MCNC: 4.1 MG/DL (ref 2.7–4.5)
PLATELET # BLD AUTO: 447 THOUSANDS/UL (ref 149–390)
PMV BLD AUTO: 9.8 FL (ref 8.9–12.7)
POTASSIUM SERPL-SCNC: 4.6 MMOL/L (ref 3.5–5.3)
RBC # BLD AUTO: 2.77 MILLION/UL (ref 3.88–5.62)
SODIUM SERPL-SCNC: 139 MMOL/L (ref 135–147)
WBC # BLD AUTO: 15.42 THOUSAND/UL (ref 4.31–10.16)

## 2024-06-15 PROCEDURE — 85027 COMPLETE CBC AUTOMATED: CPT | Performed by: NURSE PRACTITIONER

## 2024-06-15 PROCEDURE — 99233 SBSQ HOSP IP/OBS HIGH 50: CPT | Performed by: ANESTHESIOLOGY

## 2024-06-15 PROCEDURE — 71046 X-RAY EXAM CHEST 2 VIEWS: CPT

## 2024-06-15 PROCEDURE — 84100 ASSAY OF PHOSPHORUS: CPT | Performed by: NURSE PRACTITIONER

## 2024-06-15 PROCEDURE — 83735 ASSAY OF MAGNESIUM: CPT | Performed by: NURSE PRACTITIONER

## 2024-06-15 PROCEDURE — 80048 BASIC METABOLIC PNL TOTAL CA: CPT | Performed by: NURSE PRACTITIONER

## 2024-06-15 PROCEDURE — 71045 X-RAY EXAM CHEST 1 VIEW: CPT

## 2024-06-15 PROCEDURE — 3E0L317 INTRODUCTION OF OTHER THROMBOLYTIC INTO PLEURAL CAVITY, PERCUTANEOUS APPROACH: ICD-10-PCS | Performed by: ANESTHESIOLOGY

## 2024-06-15 RX ORDER — MAGNESIUM SULFATE HEPTAHYDRATE 40 MG/ML
4 INJECTION, SOLUTION INTRAVENOUS ONCE
Status: COMPLETED | OUTPATIENT
Start: 2024-06-15 | End: 2024-06-15

## 2024-06-15 RX ADMIN — SENNOSIDES AND DOCUSATE SODIUM 2 TABLET: 50; 8.6 TABLET ORAL at 18:23

## 2024-06-15 RX ADMIN — DIVALPROEX SODIUM 500 MG: 125 CAPSULE ORAL at 05:43

## 2024-06-15 RX ADMIN — LACOSAMIDE 200 MG: 100 TABLET, FILM COATED ORAL at 21:34

## 2024-06-15 RX ADMIN — POLYETHYLENE GLYCOL 3350 17 G: 17 POWDER, FOR SOLUTION ORAL at 10:23

## 2024-06-15 RX ADMIN — GABAPENTIN 400 MG: 400 CAPSULE ORAL at 21:33

## 2024-06-15 RX ADMIN — CHLORHEXIDINE GLUCONATE 0.12% ORAL RINSE 15 ML: 1.2 LIQUID ORAL at 21:33

## 2024-06-15 RX ADMIN — ACETAMINOPHEN 975 MG: 325 TABLET, FILM COATED ORAL at 05:43

## 2024-06-15 RX ADMIN — QUETIAPINE FUMARATE 150 MG: 100 TABLET ORAL at 18:21

## 2024-06-15 RX ADMIN — LEVETIRACETAM 1500 MG: 500 TABLET, FILM COATED ORAL at 21:33

## 2024-06-15 RX ADMIN — DORNASE ALFA 5 MG: 1 SOLUTION RESPIRATORY (INHALATION) at 21:19

## 2024-06-15 RX ADMIN — PRAVASTATIN SODIUM 80 MG: 80 TABLET ORAL at 15:58

## 2024-06-15 RX ADMIN — ACETAMINOPHEN 975 MG: 325 TABLET, FILM COATED ORAL at 15:57

## 2024-06-15 RX ADMIN — LEVETIRACETAM 1500 MG: 500 TABLET, FILM COATED ORAL at 10:22

## 2024-06-15 RX ADMIN — GABAPENTIN 400 MG: 400 CAPSULE ORAL at 10:22

## 2024-06-15 RX ADMIN — LEVOTHYROXINE SODIUM 150 MCG: 0.15 TABLET ORAL at 10:22

## 2024-06-15 RX ADMIN — QUETIAPINE FUMARATE 150 MG: 100 TABLET ORAL at 10:21

## 2024-06-15 RX ADMIN — ONDANSETRON 4 MG: 2 INJECTION INTRAMUSCULAR; INTRAVENOUS at 10:36

## 2024-06-15 RX ADMIN — SENNOSIDES AND DOCUSATE SODIUM 2 TABLET: 50; 8.6 TABLET ORAL at 10:22

## 2024-06-15 RX ADMIN — ZONISAMIDE 300 MG: 100 CAPSULE ORAL at 21:48

## 2024-06-15 RX ADMIN — ENOXAPARIN SODIUM 40 MG: 40 INJECTION SUBCUTANEOUS at 10:27

## 2024-06-15 RX ADMIN — MAGNESIUM SULFATE HEPTAHYDRATE 4 G: 40 INJECTION, SOLUTION INTRAVENOUS at 07:13

## 2024-06-15 RX ADMIN — LIDOCAINE 5% 2 PATCH: 700 PATCH TOPICAL at 10:23

## 2024-06-15 RX ADMIN — OXYCODONE HYDROCHLORIDE 5 MG: 5 TABLET ORAL at 10:22

## 2024-06-15 RX ADMIN — ACETAMINOPHEN 975 MG: 325 TABLET, FILM COATED ORAL at 21:33

## 2024-06-15 RX ADMIN — LACOSAMIDE 150 MG: 100 TABLET, FILM COATED ORAL at 10:21

## 2024-06-15 RX ADMIN — DIVALPROEX SODIUM 500 MG: 125 CAPSULE ORAL at 21:33

## 2024-06-15 RX ADMIN — GABAPENTIN 400 MG: 400 CAPSULE ORAL at 15:57

## 2024-06-15 RX ADMIN — ALTEPLASE 10 MG: 2.2 INJECTION, POWDER, LYOPHILIZED, FOR SOLUTION INTRAVENOUS at 21:18

## 2024-06-15 RX ADMIN — CHLORHEXIDINE GLUCONATE 0.12% ORAL RINSE 15 ML: 1.2 LIQUID ORAL at 10:27

## 2024-06-15 RX ADMIN — QUETIAPINE FUMARATE 400 MG: 100 TABLET ORAL at 21:34

## 2024-06-15 RX ADMIN — DIVALPROEX SODIUM 500 MG: 125 CAPSULE ORAL at 15:57

## 2024-06-15 RX ADMIN — LORATADINE 10 MG: 10 TABLET ORAL at 10:21

## 2024-06-15 NOTE — PLAN OF CARE
Problem: PAIN - ADULT  Goal: Verbalizes/displays adequate comfort level or baseline comfort level  Description: Interventions:  - Encourage patient to monitor pain and request assistance  - Assess pain using appropriate pain scale  - Administer analgesics based on type and severity of pain and evaluate response  - Implement non-pharmacological measures as appropriate and evaluate response  - Consider cultural and social influences on pain and pain management  - Notify physician/advanced practitioner if interventions unsuccessful or patient reports new pain  Outcome: Progressing     Problem: INFECTION - ADULT  Goal: Absence or prevention of progression during hospitalization  Description: INTERVENTIONS:  - Assess and monitor for signs and symptoms of infection  - Monitor lab/diagnostic results  - Monitor all insertion sites, i.e. indwelling lines, tubes, and drains  - Monitor endotracheal if appropriate and nasal secretions for changes in amount and color  - [place] appropriate cooling/warming therapies per order  - Administer medications as ordered  - Instruct and encourage patient and family to use good hand hygiene technique  - Identify and instruct in appropriate isolation precautions for identified infection/condition  Outcome: Progressing     Problem: SAFETY ADULT  Goal: Patient will remain free of falls  Description: INTERVENTIONS:  - Educate patient/family on patient safety including physical limitations  - Instruct patient to call for assistance with activity   - Consult OT/PT to assist with strengthening/mobility   - Keep Call bell within reach  - Keep bed low and locked with side rails adjusted as appropriate  - Keep care items and personal belongings within reach  - Initiate and maintain comfort rounds  - Make Fall Risk Sign visible to staff  - Offer Toileting every 2 Hours, in advance of need  - Initiate/Maintain bed alarm  - Apply yellow socks and bracelet for high fall risk patients  - Consider  moving patient to room near nurses station  Outcome: Progressing  Goal: Maintain or return to baseline ADL function  Description: INTERVENTIONS:  -  Assess patient's ability to carry out ADLs; assess patient's baseline for ADL function and identify physical deficits which impact ability to perform ADLs (bathing, care of mouth/teeth, toileting, grooming, dressing, etc.)  - Assess/evaluate cause of self-care deficits   - Assess range of motion  - Assess patient's mobility; develop plan if impaired  - Assess patient's need for assistive devices and provide as appropriate  - Encourage maximum independence but intervene and supervise when necessary  - Involve family in performance of ADLs  - Assess for home care needs following discharge   - Consider OT consult to assist with ADL evaluation and planning for discharge  - Provide patient education as appropriate  Outcome: Progressing  Goal: Maintains/Returns to pre admission functional level  Description: INTERVENTIONS:  - Perform AM-PAC 6 Click Basic Mobility/ Daily Activity assessment daily.  - Set and communicate daily mobility goal to care team and patient/family/caregiver.   - Collaborate with rehabilitation services on mobility goals if consulted  - Reposition patient every 2 hours.  - Dangle patient 1 times a day  - Stand patient 1 times a day  - Out of bed to chair 1 times a day   - Out of bed for meals 1 times a day  - Out of bed for toileting  - Record patient progress and toleration of activity level   Outcome: Progressing     Problem: DISCHARGE PLANNING  Goal: Discharge to home or other facility with appropriate resources  Description: INTERVENTIONS:  - Identify barriers to discharge w/patient and caregiver  - Arrange for needed discharge resources and transportation as appropriate  - Identify discharge learning needs (meds, wound care, etc.)  - Arrange for interpretive services to assist at discharge as needed  - Refer to Case Management Department for  coordinating discharge planning if the patient needs post-hospital services based on physician/advanced practitioner order or complex needs related to functional status, cognitive ability, or social support system  Outcome: Progressing     Problem: Knowledge Deficit  Goal: Patient/family/caregiver demonstrates understanding of disease process, treatment plan, medications, and discharge instructions  Description: Complete learning assessment and assess knowledge base.  Interventions:  - Provide teaching at level of understanding  - Provide teaching via preferred learning methods  Outcome: Progressing     Problem: Nutrition/Hydration-ADULT  Goal: Nutrient/Hydration intake appropriate for improving, restoring or maintaining nutritional needs  Description: Monitor and assess patient's nutrition/hydration status for malnutrition. Collaborate with interdisciplinary team and initiate plan and interventions as ordered.  Monitor patient's weight and dietary intake as ordered or per policy. Utilize nutrition screening tool and intervene as necessary. Determine patient's food preferences and provide high-protein, high-caloric foods as appropriate.     INTERVENTIONS:  - Monitor oral intake, urinary output, labs, and treatment plans  - Assess nutrition and hydration status and recommend course of action  - Evaluate amount of meals eaten  - Assist patient with eating if necessary   - Allow adequate time for meals  - Recommend/ encourage appropriate diets, oral nutritional supplements, and vitamin/mineral supplements  - Order, calculate, and assess calorie counts as needed  - Recommend, monitor, and adjust tube feedings and TPN/PPN based on assessed needs  - Assess need for intravenous fluids  - Provide specific nutrition/hydration education as appropriate  - Include patient/family/caregiver in decisions related to nutrition  Outcome: Progressing     Problem: Prexisting or High Potential for Compromised Skin Integrity  Goal: Skin  integrity is maintained or improved  Description: INTERVENTIONS:  - Identify patients at risk for skin breakdown  - Assess and monitor skin integrity  - Assess and monitor nutrition and hydration status  - Monitor labs   - Assess for incontinence   - Turn and reposition patient  - Assist with mobility/ambulation  - Relieve pressure over bony prominences  - Avoid friction and shearing  - Provide appropriate hygiene as needed including keeping skin clean and dry  - Evaluate need for skin moisturizer/barrier cream  - Collaborate with interdisciplinary team   - Patient/family teaching  - Consider wound care consult   Outcome: Progressing

## 2024-06-15 NOTE — ASSESSMENT & PLAN NOTE
S/p 10 Fr right chest tube, discontinued on 6/8  32 F chest tube placed 6/9 due to hemothorax and persistent small pneumothorax  IR consulted for pigtail placement, placed 6/14  Plan for TPA Dornase instillation  Chest tubes to suction overnight  Monitor output from chest tube

## 2024-06-15 NOTE — ASSESSMENT & PLAN NOTE
Lab Results   Component Value Date    HGBA1C 6.7 (H) 06/14/2024       Recent Labs     06/14/24  0038 06/14/24  0609 06/14/24  1224 06/14/24  1745   POCGLU 171* 179* 131 146*         Blood Sugar Average: Last 72 hrs:  (P) 161.8      Holding metformin while inpatient   Before meals and at bedtime glucoses with coverage as needed

## 2024-06-15 NOTE — ASSESSMENT & PLAN NOTE
S/p self extubation 6/4  With residual AHRF in setting of rib fractures, likely aspiration  Continue Dysphagia diet/aspiration precautions  Now on NC oxygen  Wean as able to maintain Spo2 >92%

## 2024-06-15 NOTE — PROGRESS NOTES
[facility]  Progress Note  Name: Jairon Oconnell I  MRN: [id number]  Unit/Bed#: ICU 10 I Date of Admission: 6/3/2024   Date of Service: 6/15/2024 I Hospital Day: 12    Assessment & Plan   * Cardiac arrest (HCC)  Assessment & Plan  Thought to be secondary to hypoxia secondary to airway obstruction in the setting of chocking  Now post self extubation 6/4 and at baseline mental status  WIth residual AHRF, 2/2 rib fractures vs aspiration as below  ECHO- EF 55%    Acute respiratory failure with hypoxia and hypercapnia ([facility])  Assessment & Plan  S/p self extubation 6/4  With residual AHRF in setting of rib fractures, likely aspiration  Continue Dysphagia diet/aspiration precautions  Now on NC oxygen  Wean as able to maintain Spo2 >92%    Generalized convulsive epilepsy ([facility])  Assessment & Plan  Continue home medications zonisamide, Keppra, Vimpat, Depakote, gabapentin  Seizure precautions    Closed traumatic fracture of ribs of right side with pneumothorax  Assessment & Plan  S/p 10 Fr right chest tube, discontinued on 6/8  32 F chest tube placed 6/9 due to hemothorax and persistent small pneumothorax  IR consulted for pigtail placement, placed 6/14  Plan for TPA Dornase instillation if needed  Chest tubes to suction overnight  Monitor output from chest tube    Dysphagia  Assessment & Plan  Aspiration precautions  Dysphagia 1 pureed/pudding thick, honey thick liquids  Appreciate speech recommendations       Behavior concern in adult  Assessment & Plan  Home seroquel restarted    Hypernatremia  Assessment & Plan  Likely 2/2 dehydration and poor oral intake  D5 discontinued, taking p.o. more reliably    DM (diabetes mellitus) ([facility])  Assessment & Plan  Lab Results   Component Value Date    HGBA1C 6.7 (H) 06/14/2024       Recent Labs     06/14/24  0038 06/14/24  0609 06/14/24  1224 06/14/24  1745   POCGLU 171* 179* 131 146*         Blood Sugar Average: Last 72 hrs:  (P) 161.8      Holding metformin  while inpatient   before meals and at bedtime glucoses with coverage as needed    Ambulatory dysfunction  Assessment & Plan  PT/OT when appropriate  Fall precautions    Acquired hypothyroidism  Assessment & Plan  Continue home levothyroxine    HTN (hypertension)  Assessment & Plan  Consider restarting lisinopril    HLD (hyperlipidemia)  Assessment & Plan  Continue home statin             Disposition: Stepdown Level 1    ICU Core Measures     A: Assess, Prevent, and Manage Pain Has pain been assessed? Yes  Need for changes to pain regimen? No   B: Both SAT/SAT  N/A   C: Choice of Sedation RASS Goal: N/A patient not on sedation  Need for changes to sedation or analgesia regimen? NA   D: Delirium CAM-ICU: Unable to perform secondary to Dementia or other chronic cognitive dysfunction   E: Early Mobility  Plan for early mobility? Yes   F: Family Engagement Plan for family engagement today? Yes       Review of Invasive Devices:            Prophylaxis:  VTE VTE covered by:  enoxaparin, Subcutaneous, 40 mg at 06/14/24 0832       Stress Ulcer  not ordered         Significant 24hr Events     24hr events: Hemodynamically stable overnight and stable on nasal oxygen.  Chest tubes intact to suction.     Subjective        Objective                            Vitals I/O      Most Recent Min/Max in 24hrs   Temp 98.2 °F (36.8 °C) Temp  Min: 97.9 °F (36.6 °C)  Max: 98.2 °F (36.8 °C)   Pulse 89 Pulse  Min: 78  Max: 102   Resp 21 Resp  Min: 14  Max: 21   /67 BP  Min: 115/59  Max: 165/72   O2 Sat 93 % SpO2  Min: 90 %  Max: 100 %      Intake/Output Summary (Last 24 hours) at 6/15/2024 0132  Last data filed at 6/14/2024 2000  Gross per 24 hour   Intake 1134 ml   Output 2091 ml   Net -957 ml       Diet Dysphagia/Modified Consistency; Dysphagia 1-Pureed; Pudding Thick Liquid; Honey Thick Liquid    Invasive Monitoring           Physical Exam   Physical Exam  Vitals and nursing note reviewed.   Eyes:      Extraocular Movements:  Extraocular movements intact.      Conjunctiva/sclera: Conjunctivae normal.   Skin:     General: Skin is warm and dry.      Capillary Refill: Capillary refill takes less than 2 seconds.   HENT:      Head: Normocephalic and atraumatic.      Mouth/Throat:      Mouth: Mucous membranes are moist.   Cardiovascular:      Rate and Rhythm: Normal rate and regular rhythm.      Pulses: Normal pulses.      Heart sounds: Normal heart sounds.   Abdominal: General: Bowel sounds are normal. There is no distension.      Palpations: Abdomen is soft.      Tenderness: There is no abdominal tenderness. There is no guarding.   Constitutional:       General: He is not in acute distress.  Pulmonary:      Effort: Pulmonary effort is normal. No accessory muscle usage, respiratory distress or accessory muscle usage.      Comments: Diminished in the bases  Chest tube x 2 right to suction with serosanguineous drainage  Secretions are normal.Chest:      Chest wall: No tenderness.   Neurological:      General: No focal deficit present.      Mental Status: He is alert. Mental status is at baseline. He is calm.      Motor: gross motor function is at baseline for patient.            Diagnostic Studies      EKG: Sinus rhythm  Imaging:  I have personally reviewed pertinent reports.   and I have personally reviewed pertinent films in PACS     Medications:  Scheduled PRN   acetaminophen, 975 mg, Q8H RONALD  chlorhexidine, 15 mL, Q12H RONALD  divalproex sodium, 500 mg, Q8H RONALD  enoxaparin, 40 mg, Q24H RONALD  gabapentin, 400 mg, TID  lacosamide, 150 mg, QAM  lacosamide, 200 mg, HS  levETIRAcetam, 1,500 mg, Q12H RONALD  levothyroxine, 150 mcg, Daily  lidocaine, 2 patch, Daily  loratadine, 10 mg, Daily  polyethylene glycol, 17 g, Daily  pravastatin, 80 mg, Daily With Dinner  QUEtiapine, 150 mg, BID  QUEtiapine, 400 mg, HS  senna-docusate sodium, 2 tablet, BID  zonisamide, 300 mg, HS      ondansetron, 4 mg, Q6H PRN  oxyCODONE, 2.5 mg, Q6H PRN   Or  oxyCODONE, 5 mg,  Q6H PRN       Continuous          Labs:    CBC    Recent Labs     06/13/24  0537 06/14/24  0602   WBC 11.28* 12.19*   HGB 8.3* 7.5*   HCT 26.6* 22.8*    353     BMP    Recent Labs     06/13/24  0537 06/14/24  0602   SODIUM 147 143   K 4.2 4.1    106   CO2 32 31   AGAP 7 6   BUN 19 16   CREATININE 0.53* 0.47*   CALCIUM 9.1 8.9       Coags    No recent results     Additional Electrolytes  Recent Labs     06/13/24  0537 06/14/24  0602   MG 1.6* 1.4*   PHOS 4.1 3.6   CAIONIZED 1.16 1.18          Blood Gas    No recent results  No recent results LFTs  Recent Labs     06/14/24  0602   ALT 59*   AST 36   ALKPHOS 89   ALB 3.1*   TBILI 0.30       Infectious  Recent Labs     06/14/24  0602   PROCALCITONI 0.13     Glucose  Recent Labs     06/13/24  0537 06/14/24  0602   GLUC 178* 164*               HUNTER Bermeo

## 2024-06-15 NOTE — ASSESSMENT & PLAN NOTE
S/p 10 Fr right chest tube, discontinued on 6/8  32 F chest tube placed 6/9 due to hemothorax and persistent small pneumothorax  IR consulted for pigtail placement, placed 6/14  Plan for TPA Dornase instillation if needed  Chest tubes to suction overnight  Monitor output from chest tube

## 2024-06-15 NOTE — QUICK NOTE
Chest tube 1 removed in typical fashion. 2 sutures that were approximating skin closure site left behind. Occlusive dressing placed. Patient tolerated procedure well. CXR ordered 4 hours post removal.    Rick Reyes PA-C

## 2024-06-15 NOTE — ASSESSMENT & PLAN NOTE
S/p self extubation 6/4  With residual AHRF in setting of rib fractures, likely aspiration  Continue Dysphagia diet/aspiration precautions  Now on room air  Oxygen if needed to maintain Spo2 >92%

## 2024-06-16 ENCOUNTER — APPOINTMENT (INPATIENT)
Dept: RADIOLOGY | Facility: HOSPITAL | Age: 59
DRG: 208 | End: 2024-06-16
Payer: MEDICARE

## 2024-06-16 LAB
ANION GAP SERPL CALCULATED.3IONS-SCNC: 6 MMOL/L (ref 4–13)
BUN SERPL-MCNC: 19 MG/DL (ref 5–25)
CALCIUM SERPL-MCNC: 8.5 MG/DL (ref 8.4–10.2)
CHLORIDE SERPL-SCNC: 104 MMOL/L (ref 96–108)
CO2 SERPL-SCNC: 27 MMOL/L (ref 21–32)
CREAT SERPL-MCNC: 0.53 MG/DL (ref 0.6–1.3)
ERYTHROCYTE [DISTWIDTH] IN BLOOD BY AUTOMATED COUNT: 14.3 % (ref 11.6–15.1)
GFR SERPL CREATININE-BSD FRML MDRD: 117 ML/MIN/1.73SQ M
GLUCOSE SERPL-MCNC: 168 MG/DL (ref 65–140)
HCT VFR BLD AUTO: 23.6 % (ref 36.5–49.3)
HGB BLD-MCNC: 7.7 G/DL (ref 12–17)
HGB RETIC QN AUTO: 30.9 PG (ref 30–38.3)
IMM RETICS NFR: 38.6 % (ref 0–14)
MAGNESIUM SERPL-MCNC: 1.7 MG/DL (ref 1.9–2.7)
MCH RBC QN AUTO: 32 PG (ref 26.8–34.3)
MCHC RBC AUTO-ENTMCNC: 32.6 G/DL (ref 31.4–37.4)
MCV RBC AUTO: 98 FL (ref 82–98)
PHOSPHATE SERPL-MCNC: 3.6 MG/DL (ref 2.7–4.5)
PLATELET # BLD AUTO: 437 THOUSANDS/UL (ref 149–390)
PMV BLD AUTO: 10.2 FL (ref 8.9–12.7)
POTASSIUM SERPL-SCNC: 4.3 MMOL/L (ref 3.5–5.3)
RBC # BLD AUTO: 2.41 MILLION/UL (ref 3.88–5.62)
RETICS # AUTO: ABNORMAL 10*3/UL (ref 14356–105094)
RETICS # CALC: 4.96 % (ref 0.37–1.87)
SODIUM SERPL-SCNC: 137 MMOL/L (ref 135–147)
WBC # BLD AUTO: 12.54 THOUSAND/UL (ref 4.31–10.16)

## 2024-06-16 PROCEDURE — 83550 IRON BINDING TEST: CPT | Performed by: ANESTHESIOLOGY

## 2024-06-16 PROCEDURE — 71045 X-RAY EXAM CHEST 1 VIEW: CPT

## 2024-06-16 PROCEDURE — 85027 COMPLETE CBC AUTOMATED: CPT | Performed by: NURSE PRACTITIONER

## 2024-06-16 PROCEDURE — 83735 ASSAY OF MAGNESIUM: CPT | Performed by: NURSE PRACTITIONER

## 2024-06-16 PROCEDURE — 85046 RETICYTE/HGB CONCENTRATE: CPT | Performed by: ANESTHESIOLOGY

## 2024-06-16 PROCEDURE — 82728 ASSAY OF FERRITIN: CPT | Performed by: ANESTHESIOLOGY

## 2024-06-16 PROCEDURE — 83540 ASSAY OF IRON: CPT | Performed by: ANESTHESIOLOGY

## 2024-06-16 PROCEDURE — 80048 BASIC METABOLIC PNL TOTAL CA: CPT | Performed by: NURSE PRACTITIONER

## 2024-06-16 PROCEDURE — 99233 SBSQ HOSP IP/OBS HIGH 50: CPT | Performed by: ANESTHESIOLOGY

## 2024-06-16 PROCEDURE — 97530 THERAPEUTIC ACTIVITIES: CPT

## 2024-06-16 PROCEDURE — 3E0L317 INTRODUCTION OF OTHER THROMBOLYTIC INTO PLEURAL CAVITY, PERCUTANEOUS APPROACH: ICD-10-PCS | Performed by: ANESTHESIOLOGY

## 2024-06-16 PROCEDURE — 84100 ASSAY OF PHOSPHORUS: CPT | Performed by: NURSE PRACTITIONER

## 2024-06-16 RX ORDER — MULTIVIT WITH IRON,MINERALS
1 TABLET,CHEWABLE ORAL DAILY
Status: DISCONTINUED | OUTPATIENT
Start: 2024-06-16 | End: 2024-06-16

## 2024-06-16 RX ORDER — FOLIC ACID 1 MG/1
1 TABLET ORAL DAILY
Status: DISCONTINUED | OUTPATIENT
Start: 2024-06-16 | End: 2024-06-21 | Stop reason: HOSPADM

## 2024-06-16 RX ORDER — MAGNESIUM SULFATE HEPTAHYDRATE 40 MG/ML
2 INJECTION, SOLUTION INTRAVENOUS ONCE
Status: COMPLETED | OUTPATIENT
Start: 2024-06-16 | End: 2024-06-16

## 2024-06-16 RX ORDER — CALCIUM CARB/VITAMIN D3/VIT K1 500-500-40
15 TABLET,CHEWABLE ORAL DAILY
Status: DISCONTINUED | OUTPATIENT
Start: 2024-06-16 | End: 2024-06-21 | Stop reason: HOSPADM

## 2024-06-16 RX ORDER — LANOLIN ALCOHOL/MO/W.PET/CERES
400 CREAM (GRAM) TOPICAL 2 TIMES DAILY
Status: DISCONTINUED | OUTPATIENT
Start: 2024-06-16 | End: 2024-06-21 | Stop reason: HOSPADM

## 2024-06-16 RX ADMIN — LEVETIRACETAM 1500 MG: 500 TABLET, FILM COATED ORAL at 09:36

## 2024-06-16 RX ADMIN — ACETAMINOPHEN 975 MG: 325 TABLET, FILM COATED ORAL at 05:26

## 2024-06-16 RX ADMIN — POLYETHYLENE GLYCOL 3350 17 G: 17 POWDER, FOR SOLUTION ORAL at 09:36

## 2024-06-16 RX ADMIN — CYANOCOBALAMIN TAB 500 MCG 500 MCG: 500 TAB at 13:16

## 2024-06-16 RX ADMIN — LACOSAMIDE 150 MG: 100 TABLET, FILM COATED ORAL at 09:36

## 2024-06-16 RX ADMIN — ENOXAPARIN SODIUM 40 MG: 40 INJECTION SUBCUTANEOUS at 09:36

## 2024-06-16 RX ADMIN — LACOSAMIDE 200 MG: 100 TABLET, FILM COATED ORAL at 21:23

## 2024-06-16 RX ADMIN — QUETIAPINE FUMARATE 400 MG: 100 TABLET ORAL at 21:22

## 2024-06-16 RX ADMIN — Medication 15 ML: at 13:00

## 2024-06-16 RX ADMIN — DORNASE ALFA 5 MG: 1 SOLUTION RESPIRATORY (INHALATION) at 21:35

## 2024-06-16 RX ADMIN — Medication 400 MG: at 17:18

## 2024-06-16 RX ADMIN — DIVALPROEX SODIUM 500 MG: 125 CAPSULE ORAL at 21:23

## 2024-06-16 RX ADMIN — LIDOCAINE 5% 2 PATCH: 700 PATCH TOPICAL at 09:37

## 2024-06-16 RX ADMIN — Medication 400 MG: at 09:36

## 2024-06-16 RX ADMIN — QUETIAPINE FUMARATE 150 MG: 100 TABLET ORAL at 17:18

## 2024-06-16 RX ADMIN — GABAPENTIN 400 MG: 400 CAPSULE ORAL at 17:18

## 2024-06-16 RX ADMIN — SENNOSIDES AND DOCUSATE SODIUM 2 TABLET: 50; 8.6 TABLET ORAL at 09:36

## 2024-06-16 RX ADMIN — DORNASE ALFA 5 MG: 1 SOLUTION RESPIRATORY (INHALATION) at 09:10

## 2024-06-16 RX ADMIN — ACETAMINOPHEN 975 MG: 325 TABLET, FILM COATED ORAL at 21:22

## 2024-06-16 RX ADMIN — QUETIAPINE FUMARATE 150 MG: 100 TABLET ORAL at 09:36

## 2024-06-16 RX ADMIN — LORATADINE 10 MG: 10 TABLET ORAL at 09:37

## 2024-06-16 RX ADMIN — MAGNESIUM SULFATE HEPTAHYDRATE 2 G: 40 INJECTION, SOLUTION INTRAVENOUS at 06:43

## 2024-06-16 RX ADMIN — ACETAMINOPHEN 975 MG: 325 TABLET, FILM COATED ORAL at 13:04

## 2024-06-16 RX ADMIN — DIVALPROEX SODIUM 500 MG: 125 CAPSULE ORAL at 13:16

## 2024-06-16 RX ADMIN — CHLORHEXIDINE GLUCONATE 0.12% ORAL RINSE 15 ML: 1.2 LIQUID ORAL at 21:24

## 2024-06-16 RX ADMIN — FOLIC ACID 1 MG: 1 TABLET ORAL at 13:16

## 2024-06-16 RX ADMIN — DIVALPROEX SODIUM 500 MG: 125 CAPSULE ORAL at 05:26

## 2024-06-16 RX ADMIN — ALTEPLASE 10 MG: 2.2 INJECTION, POWDER, LYOPHILIZED, FOR SOLUTION INTRAVENOUS at 09:10

## 2024-06-16 RX ADMIN — GABAPENTIN 400 MG: 400 CAPSULE ORAL at 09:36

## 2024-06-16 RX ADMIN — ZONISAMIDE 300 MG: 100 CAPSULE ORAL at 21:25

## 2024-06-16 RX ADMIN — SENNOSIDES AND DOCUSATE SODIUM 2 TABLET: 50; 8.6 TABLET ORAL at 17:18

## 2024-06-16 RX ADMIN — OXYCODONE HYDROCHLORIDE 5 MG: 5 TABLET ORAL at 13:04

## 2024-06-16 RX ADMIN — ALTEPLASE 10 MG: 2.2 INJECTION, POWDER, LYOPHILIZED, FOR SOLUTION INTRAVENOUS at 21:35

## 2024-06-16 RX ADMIN — PRAVASTATIN SODIUM 80 MG: 80 TABLET ORAL at 17:18

## 2024-06-16 RX ADMIN — LEVETIRACETAM 1500 MG: 500 TABLET, FILM COATED ORAL at 21:23

## 2024-06-16 RX ADMIN — LEVOTHYROXINE SODIUM 150 MCG: 0.15 TABLET ORAL at 09:36

## 2024-06-16 RX ADMIN — CHLORHEXIDINE GLUCONATE 0.12% ORAL RINSE 15 ML: 1.2 LIQUID ORAL at 09:37

## 2024-06-16 RX ADMIN — GABAPENTIN 400 MG: 400 CAPSULE ORAL at 21:24

## 2024-06-16 NOTE — QUICK NOTE
Chest x-ray performed at 7:30 PM reveals right chest tube within the pleural space, and residual pneumothorax unchanged from previous chest x-ray.  tPA and dornase instilled into right chest tube.  Clamped.  To be drained at 10:15 PM.  Tolerated well.

## 2024-06-16 NOTE — PLAN OF CARE
Problem: PHYSICAL THERAPY ADULT  Goal: Performs mobility at highest level of function for planned discharge setting.  See evaluation for individualized goals.  Description: Treatment/Interventions: Functional transfer training, LE strengthening/ROM, Therapeutic exercise, Endurance training, Cognitive reorientation, Patient/family training, Bed mobility, Continued evaluation, Spoke to nursing, OT          See flowsheet documentation for full assessment, interventions and recommendations.  Outcome: Progressing  Note: Prognosis: Fair  Problem List: Decreased strength, Decreased endurance, Impaired balance, Decreased mobility, Decreased cognition, Pain  Assessment: Pt seen for PT treatment session this date, consisting of ther act focused on bed mobility, transfers, short gait trial with use of RW, initiation of LB exercises with vc for technique . Since previous session, pt has made good progress in terms of increased level surface gait, improved standing balance 1/2 grade. Pertinent barriers during this session include cognitive status. Current goals and POC established on IE remain appropriate, pt continues to have rehab potential and is making good progress towards STGs. Pt prognosis for achieving goals is fair, pending pt progress with hospitalization/medical status improvements, and indicated by Stimulability. Pt limited d/t poor orientation, lack of self-control (impulsivity), and self limiting. Pt continues to be functioning below baseline level, and remains limited 2* factors listed above. PT will continue to see pt during current hospitalization in order to address the deficits listed above and provide interventions consistent w/ POC in effort to achieve STGs. PT recommends level 2, moderate resource intensity upon discharge.  Barriers to Discharge: Inaccessible home environment, Decreased caregiver support     Rehab Resource Intensity Level, PT: II (Moderate Resource Intensity)    See flowsheet  documentation for full assessment.

## 2024-06-16 NOTE — PLAN OF CARE
Problem: PAIN - ADULT  Goal: Verbalizes/displays adequate comfort level or baseline comfort level  Description: Interventions:  - Encourage patient to monitor pain and request assistance  - Assess pain using appropriate pain scale  - Administer analgesics based on type and severity of pain and evaluate response  - Implement non-pharmacological measures as appropriate and evaluate response  - Consider cultural and social influences on pain and pain management  - Notify physician/advanced practitioner if interventions unsuccessful or patient reports new pain  Outcome: Progressing     Problem: INFECTION - ADULT  Goal: Absence or prevention of progression during hospitalization  Description: INTERVENTIONS:  - Assess and monitor for signs and symptoms of infection  - Monitor lab/diagnostic results  - Monitor all insertion sites, i.e. indwelling lines, tubes, and drains  - Monitor endotracheal if appropriate and nasal secretions for changes in amount and color  - [place] appropriate cooling/warming therapies per order  - Administer medications as ordered  - Instruct and encourage patient and family to use good hand hygiene technique  - Identify and instruct in appropriate isolation precautions for identified infection/condition  Outcome: Progressing     Problem: SAFETY ADULT  Goal: Patient will remain free of falls  Description: INTERVENTIONS:  - Educate patient/family on patient safety including physical limitations  - Instruct patient to call for assistance with activity   - Consult OT/PT to assist with strengthening/mobility   - Keep Call bell within reach  - Keep bed low and locked with side rails adjusted as appropriate  - Keep care items and personal belongings within reach  - Initiate and maintain comfort rounds  - Make Fall Risk Sign visible to staff  - Offer Toileting every 2 Hours, in advance of need  - Initiate/Maintain bed alarm  - Apply yellow socks and bracelet for high fall risk patients  - Consider  moving patient to room near nurses station  Outcome: Progressing

## 2024-06-16 NOTE — PHYSICAL THERAPY NOTE
"Physical Therapy Treatment Note       06/16/24 1240   PT Last Visit   PT Visit Date 06/16/24   Note Type   Note Type Treatment   Pain Assessment   Pain Assessment Tool   (no numeric score provided)   Pain Location/Orientation Orientation: Right  (chest tube)   Pain Rating: FLACC (Rest) - Face 0   Pain Rating: FLACC (Rest) - Legs 0   Pain Rating: FLACC (Rest) - Activity 0   Pain Rating: FLACC (Rest) - Cry 0   Pain Rating: FLACC (Rest) - Consolability 0   Score: FLACC (Rest) 0   Pain Rating: FLACC (Activity) - Face 1   Pain Rating: FLACC (Activity) - Legs 1   Pain Rating: FLACC (Activity) - Activity 1   Pain Rating: FLACC (Activity) - Cry 1   Pain Rating: FLACC (Activity) - Consolability 1   Score: FLACC (Activity) 5   Restrictions/Precautions   Weight Bearing Precautions Per Order Yes   Other Precautions Cognitive;Chair Alarm;Bed Alarm;Multiple lines;Telemetry;Fall Risk;Pain   General   Chart Reviewed Yes   Response to Previous Treatment Patient unable to report, no changes reported from family or staff   Family/Caregiver Present No   Cognition   Overall Cognitive Status Impaired   Arousal/Participation Alert;Cooperative  (pleasant)   Attention Attends with cues to redirect   Orientation Level Oriented to person;Disoriented to time;Disoriented to situation  (\"hospital\")   Memory Decreased recall of recent events;Decreased short term memory   Following Commands Follows one step commands with increased time or repetition   Comments pt agreeable to PT session   Subjective   Subjective \"ok\"   Bed Mobility   Supine to Sit 4  Minimal assistance   Additional items Assist x 2;HOB elevated;Bedrails;Increased time required;Verbal cues;LE management   Transfers   Sit to Stand 4  Minimal assistance   Additional items Assist x 2;Armrests;Increased time required;Verbal cues   Stand to Sit 4  Minimal assistance   Additional items Assist x 2;Armrests;Increased time required;Verbal cues   Ambulation/Elevation   Gait pattern Decreased " foot clearance;Forward Flexion;Shuffling;Short stride;Step to;Excessively slow;Knees flexed;Decreased toe off;Decreased heel strike   Gait Assistance 4  Minimal assist   Additional items Assist x 2;Verbal cues;Tactile cues   Assistive Device Rolling walker   Distance 5'   Balance   Static Sitting Fair   Dynamic Sitting Fair -   Static Standing Poor +   Dynamic Standing Poor   Ambulatory Poor   Endurance Deficit   Endurance Deficit Yes   Activity Tolerance   Activity Tolerance Patient limited by fatigue   Nurse Made Aware RN Andrew   Exercises   Knee AROM Long Arc Quad Sitting;10 reps;AROM;Bilateral   Marching Sitting;10 reps;AROM;Bilateral   Assessment   Prognosis Fair   Problem List Decreased strength;Decreased endurance;Impaired balance;Decreased mobility;Decreased cognition;Pain   Assessment Pt seen for PT treatment session this date, consisting of ther act focused on bed mobility, transfers, short gait trial with use of RW, initiation of LB exercises with vc for technique . Since previous session, pt has made good progress in terms of increased level surface gait, improved standing balance 1/2 grade. Pertinent barriers during this session include cognitive status. Current goals and POC established on IE remain appropriate, pt continues to have rehab potential and is making good progress towards STGs. Pt prognosis for achieving goals is fair, pending pt progress with hospitalization/medical status improvements, and indicated by Stimulability. Pt limited d/t poor orientation, lack of self-control (impulsivity), and self limiting. Pt continues to be functioning below baseline level, and remains limited 2* factors listed above. PT will continue to see pt during current hospitalization in order to address the deficits listed above and provide interventions consistent w/ POC in effort to achieve STGs. PT recommends level 2, moderate resource intensity upon discharge.   Barriers to Discharge Inaccessible home  environment;Decreased caregiver support   Goals   Patient Goals none expressed   STG Expiration Date 06/26/24   PT Treatment Day 3   Plan   Treatment/Interventions Functional transfer training;LE strengthening/ROM;Therapeutic exercise;Endurance training;Cognitive reorientation;Patient/family training;Bed mobility;Continued evaluation;Spoke to nursing;OT   Progress Progressing toward goals   PT Frequency 3-5x/wk   Discharge Recommendation   Rehab Resource Intensity Level, PT II (Moderate Resource Intensity)   AM-PAC Basic Mobility Inpatient   Turning in Flat Bed Without Bedrails 3   Lying on Back to Sitting on Edge of Flat Bed Without Bedrails 2   Moving Bed to Chair 2   Standing Up From Chair Using Arms 2   Walk in Room 2   Climb 3-5 Stairs With Railing 1   Basic Mobility Inpatient Raw Score 12   Basic Mobility Standardized Score 32.23   [facility] Highest Level Of Mobility   -HLM Goal 4: Move to chair/commode   -HLM Achieved 4: Move to chair/commode   Education   Education Provided Mobility training;Assistive device;Home exercise program   Patient Reinforcement needed   End of Consult   Patient Position at End of Consult Bedside chair;Bed/Chair alarm activated;All needs within reach       Merlene Reyes, PT, DPT

## 2024-06-16 NOTE — PROGRESS NOTES
[facility]  Progress Note  Name: Jairon Oconnell I  MRN: [id number]  Unit/Bed#: ICU 10 I Date of Admission: 6/3/2024   Date of Service: 6/16/2024 I Hospital Day: 13    Assessment & Plan   * Cardiac arrest (HCC)  Assessment & Plan  Thought to be secondary to hypoxia secondary to airway obstruction in the setting of chocking  Now post self extubation 6/4 and at baseline mental status  WIth residual AHRF, 2/2 rib fractures vs aspiration as below  ECHO- EF 55%    Acute respiratory failure with hypoxia and hypercapnia (Formerly [facility])  Assessment & Plan  S/p self extubation 6/4  With residual AHRF in setting of rib fractures, likely aspiration  Continue Dysphagia diet/aspiration precautions  Now on room air  Oxygen if needed to maintain Spo2 >92%    Generalized convulsive epilepsy (Formerly [facility])  Assessment & Plan  Continue home medications zonisamide, Keppra, Vimpat, Depakote, gabapentin  Seizure precautions    Closed traumatic fracture of ribs of right side with pneumothorax  Assessment & Plan  S/p 10 Fr right chest tube, discontinued on 6/8  32 F chest tube placed 6/9 due to hemothorax and persistent small pneumothorax  IR consulted for pigtail placement, placed 6/14  Plan for TPA Dornase instillation  Chest tubes to suction overnight  Monitor output from chest tube    Dysphagia  Assessment & Plan  Aspiration precautions  Dysphagia 1 pureed/pudding thick, honey thick liquids  Appreciate speech recommendations       Behavior concern in adult  Assessment & Plan  Continue home meds    DM (diabetes mellitus) (Formerly [facility])  Assessment & Plan  Lab Results   Component Value Date    HGBA1C 6.7 (H) 06/14/2024       Recent Labs     06/14/24  0038 06/14/24  0609 06/14/24  1224 06/14/24  1745   POCGLU 171* 179* 131 146*         Blood Sugar Average: Last 72 hrs:  (P) 161.8      Holding metformin while inpatient   Before meals and at bedtime glucoses with coverage as needed    Ambulatory dysfunction  Assessment & Plan  PT/OT when  appropriate  Fall precautions    Acquired hypothyroidism  Assessment & Plan  Continue home levothyroxine    HTN (hypertension)  Assessment & Plan  Consider restarting lisinopril    HLD (hyperlipidemia)  Assessment & Plan  Continue home statin    Hypernatremia-resolved as of 6/15/2024  Assessment & Plan  Likely 2/2 dehydration and poor oral intake  D5 discontinued, taking p.o. more reliably             Disposition: Stepdown Level 1    ICU Core Measures     A: Assess, Prevent, and Manage Pain Has pain been assessed? Yes  Need for changes to pain regimen? No   B: Both SAT/SAT  N/A   C: Choice of Sedation RASS Goal: N/A patient not on sedation  Need for changes to sedation or analgesia regimen? NA   D: Delirium CAM-ICU: Unable to perform secondary to Dementia or other chronic cognitive dysfunction   E: Early Mobility  Plan for early mobility? Yes   F: Family Engagement Plan for family engagement today? Yes       Review of Invasive Devices:            Prophylaxis:  VTE VTE covered by:  enoxaparin, Subcutaneous, 40 mg at 06/15/24 1027       Stress Ulcer  not ordered         Significant 24hr Events     24hr events: Stable oxygenation overnight.  Large bore chest tube pulled yesterday, leaving the small bore tube behind.  tPA dornase instilled last night with serosanguineous return.     Subjective        Objective                            Vitals I/O      Most Recent Min/Max in 24hrs   Temp 98.8 °F (37.1 °C) Temp  Min: 97.9 °F (36.6 °C)  Max: 98.8 °F (37.1 °C)   Pulse 101 Pulse  Min: 89  Max: 112   Resp 18 Resp  Min: 18  Max: 24   /70 BP  Min: 135/70  Max: 152/74   O2 Sat 92 % SpO2  Min: 92 %  Max: 97 %      Intake/Output Summary (Last 24 hours) at 6/16/2024 0002  Last data filed at 6/15/2024 1800  Gross per 24 hour   Intake 400 ml   Output 1610 ml   Net -1210 ml       Diet Dysphagia/Modified Consistency; Dysphagia 1-Pureed; Pudding Thick Liquid; Honey Thick Liquid    Invasive Monitoring           Physical Exam    Physical Exam  Vitals and nursing note reviewed.   Eyes:      General: No scleral icterus.     Extraocular Movements: Extraocular movements intact.      Conjunctiva/sclera: Conjunctivae normal.   Skin:     General: Skin is warm and dry.      Capillary Refill: Capillary refill takes less than 2 seconds.   HENT:      Head: Normocephalic and atraumatic.      Mouth/Throat:      Mouth: Mucous membranes are moist.   Neck:      Vascular: No JVD.   Cardiovascular:      Rate and Rhythm: Normal rate and regular rhythm.      Pulses: Normal pulses.      Heart sounds: Normal heart sounds.   Musculoskeletal:         General: Normal range of motion.   Abdominal: General: Bowel sounds are normal. There is no distension.      Palpations: Abdomen is soft.      Tenderness: There is no abdominal tenderness. There is no guarding.   Constitutional:       General: He is not in acute distress.  Pulmonary:      Effort: Pulmonary effort is normal. No accessory muscle usage, respiratory distress or accessory muscle usage.      Breath sounds: Normal breath sounds.      Comments: Right chest tube with serosanguineous drainage to suction  Secretions are normal.Chest:      Chest wall: No tenderness.   Neurological:      General: No focal deficit present.      Mental Status: He is alert. Mental status is at baseline. He is calm.      Motor: gross motor function is at baseline for patient.            Diagnostic Studies      EKG: Sinus rhythm  Imaging:  I have personally reviewed pertinent reports.   and I have personally reviewed pertinent films in PACS     Medications:  Scheduled PRN   acetaminophen, 975 mg, Q8H RONALD  alteplase (TPA) 10 mg in SWFI 30 mL chest tube/drain tube instillation, 10 mg, Once   And  dornase skyler (PULMOZYME) 5 mg in 30 mL SWFI chest tube/drain tube instillation, 5 mg, Once  chlorhexidine, 15 mL, Q12H RONALD  divalproex sodium, 500 mg, Q8H RONALD  enoxaparin, 40 mg, Q24H RONALD  gabapentin, 400 mg, TID  lacosamide, 150 mg,  QAM  lacosamide, 200 mg, HS  levETIRAcetam, 1,500 mg, Q12H RONALD  levothyroxine, 150 mcg, Daily  lidocaine, 2 patch, Daily  loratadine, 10 mg, Daily  polyethylene glycol, 17 g, Daily  pravastatin, 80 mg, Daily With Dinner  QUEtiapine, 150 mg, BID  QUEtiapine, 400 mg, HS  senna-docusate sodium, 2 tablet, BID  zonisamide, 300 mg, HS      ondansetron, 4 mg, Q6H PRN  oxyCODONE, 2.5 mg, Q6H PRN   Or  oxyCODONE, 5 mg, Q6H PRN       Continuous          Labs:    CBC    Recent Labs     06/14/24  0602 06/15/24  0553   WBC 12.19* 15.42*   HGB 7.5* 8.8*   HCT 22.8* 27.5*    447*     BMP    Recent Labs     06/14/24  0602 06/15/24  0553   SODIUM 143 139   K 4.1 4.6    104   CO2 31 28   AGAP 6 7   BUN 16 18   CREATININE 0.47* 0.57*   CALCIUM 8.9 9.1       Coags    No recent results     Additional Electrolytes  Recent Labs     06/14/24  0602 06/15/24  0553   MG 1.4* 1.6*   PHOS 3.6 4.1   CAIONIZED 1.18  --           Blood Gas    No recent results  No recent results LFTs  Recent Labs     06/14/24  0602   ALT 59*   AST 36   ALKPHOS 89   ALB 3.1*   TBILI 0.30       Infectious  Recent Labs     06/14/24  0602   PROCALCITONI 0.13     Glucose  Recent Labs     06/14/24  0602 06/15/24  0553   GLUC 164* 152*               HUNTER Bermeo

## 2024-06-16 NOTE — QUICK NOTE
Alteplase and dornase given via chest tube side port at 9:10 am; During Instillation about 10 cc dornase leaked out of loosely connection, this was rectified and the rest went typically without event. Will leave clamped for 1 hour.    Rick Reyes PA-C     10:13 AM:     Unclamped chest tube, dark drainage noted. Tolerated well.    Rick Reyes PA-C

## 2024-06-17 LAB
ANION GAP SERPL CALCULATED.3IONS-SCNC: 6 MMOL/L (ref 4–13)
BUN SERPL-MCNC: 23 MG/DL (ref 5–25)
CALCIUM SERPL-MCNC: 8.5 MG/DL (ref 8.4–10.2)
CHLORIDE SERPL-SCNC: 103 MMOL/L (ref 96–108)
CO2 SERPL-SCNC: 29 MMOL/L (ref 21–32)
CREAT SERPL-MCNC: 0.61 MG/DL (ref 0.6–1.3)
ERYTHROCYTE [DISTWIDTH] IN BLOOD BY AUTOMATED COUNT: 14.7 % (ref 11.6–15.1)
FERRITIN SERPL-MCNC: 113 NG/ML (ref 24–336)
GFR SERPL CREATININE-BSD FRML MDRD: 110 ML/MIN/1.73SQ M
GLUCOSE SERPL-MCNC: 218 MG/DL (ref 65–140)
GLUCOSE SERPL-MCNC: 254 MG/DL (ref 65–140)
GLUCOSE SERPL-MCNC: 275 MG/DL (ref 65–140)
GLUCOSE SERPL-MCNC: 313 MG/DL (ref 65–140)
HCT VFR BLD AUTO: 27.4 % (ref 36.5–49.3)
HGB BLD-MCNC: 8.6 G/DL (ref 12–17)
IRON SATN MFR SERPL: 11 % (ref 15–50)
IRON SERPL-MCNC: 25 UG/DL (ref 50–212)
MAGNESIUM SERPL-MCNC: 1.7 MG/DL (ref 1.9–2.7)
MCH RBC QN AUTO: 31.3 PG (ref 26.8–34.3)
MCHC RBC AUTO-ENTMCNC: 31.4 G/DL (ref 31.4–37.4)
MCV RBC AUTO: 100 FL (ref 82–98)
PHOSPHATE SERPL-MCNC: 3.9 MG/DL (ref 2.7–4.5)
PLATELET # BLD AUTO: 517 THOUSANDS/UL (ref 149–390)
PMV BLD AUTO: 10 FL (ref 8.9–12.7)
POTASSIUM SERPL-SCNC: 4.5 MMOL/L (ref 3.5–5.3)
RBC # BLD AUTO: 2.75 MILLION/UL (ref 3.88–5.62)
SODIUM SERPL-SCNC: 138 MMOL/L (ref 135–147)
TIBC SERPL-MCNC: 230 UG/DL (ref 250–450)
UIBC SERPL-MCNC: 205 UG/DL (ref 155–355)
WBC # BLD AUTO: 11.42 THOUSAND/UL (ref 4.31–10.16)

## 2024-06-17 PROCEDURE — 3E0L317 INTRODUCTION OF OTHER THROMBOLYTIC INTO PLEURAL CAVITY, PERCUTANEOUS APPROACH: ICD-10-PCS | Performed by: STUDENT IN AN ORGANIZED HEALTH CARE EDUCATION/TRAINING PROGRAM

## 2024-06-17 PROCEDURE — 83735 ASSAY OF MAGNESIUM: CPT | Performed by: NURSE PRACTITIONER

## 2024-06-17 PROCEDURE — 84100 ASSAY OF PHOSPHORUS: CPT | Performed by: NURSE PRACTITIONER

## 2024-06-17 PROCEDURE — 97530 THERAPEUTIC ACTIVITIES: CPT

## 2024-06-17 PROCEDURE — 80048 BASIC METABOLIC PNL TOTAL CA: CPT | Performed by: NURSE PRACTITIONER

## 2024-06-17 PROCEDURE — 82948 REAGENT STRIP/BLOOD GLUCOSE: CPT

## 2024-06-17 PROCEDURE — 92526 ORAL FUNCTION THERAPY: CPT

## 2024-06-17 PROCEDURE — 85027 COMPLETE CBC AUTOMATED: CPT | Performed by: NURSE PRACTITIONER

## 2024-06-17 PROCEDURE — 99233 SBSQ HOSP IP/OBS HIGH 50: CPT | Performed by: STUDENT IN AN ORGANIZED HEALTH CARE EDUCATION/TRAINING PROGRAM

## 2024-06-17 RX ORDER — INSULIN LISPRO 100 [IU]/ML
1-6 INJECTION, SOLUTION INTRAVENOUS; SUBCUTANEOUS
Status: DISCONTINUED | OUTPATIENT
Start: 2024-06-17 | End: 2024-06-21 | Stop reason: HOSPADM

## 2024-06-17 RX ORDER — MAGNESIUM SULFATE HEPTAHYDRATE 40 MG/ML
4 INJECTION, SOLUTION INTRAVENOUS ONCE
Status: COMPLETED | OUTPATIENT
Start: 2024-06-17 | End: 2024-06-17

## 2024-06-17 RX ORDER — BISACODYL 10 MG
10 SUPPOSITORY, RECTAL RECTAL DAILY
Status: DISCONTINUED | OUTPATIENT
Start: 2024-06-17 | End: 2024-06-17

## 2024-06-17 RX ADMIN — CHLORHEXIDINE GLUCONATE 0.12% ORAL RINSE 15 ML: 1.2 LIQUID ORAL at 21:50

## 2024-06-17 RX ADMIN — ENOXAPARIN SODIUM 40 MG: 40 INJECTION SUBCUTANEOUS at 08:01

## 2024-06-17 RX ADMIN — LIDOCAINE 5% 2 PATCH: 700 PATCH TOPICAL at 08:00

## 2024-06-17 RX ADMIN — ACETAMINOPHEN 975 MG: 325 TABLET, FILM COATED ORAL at 14:12

## 2024-06-17 RX ADMIN — WATER 5 MG: 1 INJECTION INTRAMUSCULAR; INTRAVENOUS; SUBCUTANEOUS at 10:09

## 2024-06-17 RX ADMIN — ZONISAMIDE 300 MG: 100 CAPSULE ORAL at 21:35

## 2024-06-17 RX ADMIN — ACETAMINOPHEN 975 MG: 325 TABLET, FILM COATED ORAL at 21:26

## 2024-06-17 RX ADMIN — INSULIN LISPRO 3 UNITS: 100 INJECTION, SOLUTION INTRAVENOUS; SUBCUTANEOUS at 13:13

## 2024-06-17 RX ADMIN — CYANOCOBALAMIN TAB 500 MCG 500 MCG: 500 TAB at 08:01

## 2024-06-17 RX ADMIN — GABAPENTIN 400 MG: 400 CAPSULE ORAL at 21:27

## 2024-06-17 RX ADMIN — LORATADINE 10 MG: 10 TABLET ORAL at 08:02

## 2024-06-17 RX ADMIN — FOLIC ACID 1 MG: 1 TABLET ORAL at 08:02

## 2024-06-17 RX ADMIN — Medication 400 MG: at 08:02

## 2024-06-17 RX ADMIN — SENNOSIDES AND DOCUSATE SODIUM 2 TABLET: 50; 8.6 TABLET ORAL at 08:02

## 2024-06-17 RX ADMIN — LEVETIRACETAM 1500 MG: 500 TABLET, FILM COATED ORAL at 21:25

## 2024-06-17 RX ADMIN — Medication 400 MG: at 17:20

## 2024-06-17 RX ADMIN — LACOSAMIDE 200 MG: 100 TABLET, FILM COATED ORAL at 21:26

## 2024-06-17 RX ADMIN — QUETIAPINE FUMARATE 150 MG: 100 TABLET ORAL at 08:01

## 2024-06-17 RX ADMIN — DORNASE ALFA 5 MG: 1 SOLUTION RESPIRATORY (INHALATION) at 21:48

## 2024-06-17 RX ADMIN — GABAPENTIN 400 MG: 400 CAPSULE ORAL at 17:20

## 2024-06-17 RX ADMIN — ALTEPLASE 10 MG: 2.2 INJECTION, POWDER, LYOPHILIZED, FOR SOLUTION INTRAVENOUS at 21:49

## 2024-06-17 RX ADMIN — LEVOTHYROXINE SODIUM 150 MCG: 0.15 TABLET ORAL at 08:02

## 2024-06-17 RX ADMIN — PRAVASTATIN SODIUM 80 MG: 80 TABLET ORAL at 17:20

## 2024-06-17 RX ADMIN — SENNOSIDES AND DOCUSATE SODIUM 2 TABLET: 50; 8.6 TABLET ORAL at 17:20

## 2024-06-17 RX ADMIN — BISACODYL 10 MG: 10 SUPPOSITORY RECTAL at 08:02

## 2024-06-17 RX ADMIN — CHLORHEXIDINE GLUCONATE 0.12% ORAL RINSE 15 ML: 1.2 LIQUID ORAL at 08:01

## 2024-06-17 RX ADMIN — DIVALPROEX SODIUM 500 MG: 125 CAPSULE ORAL at 05:45

## 2024-06-17 RX ADMIN — POLYETHYLENE GLYCOL 3350 17 G: 17 POWDER, FOR SOLUTION ORAL at 08:00

## 2024-06-17 RX ADMIN — QUETIAPINE FUMARATE 150 MG: 100 TABLET ORAL at 17:20

## 2024-06-17 RX ADMIN — INSULIN LISPRO 4 UNITS: 100 INJECTION, SOLUTION INTRAVENOUS; SUBCUTANEOUS at 17:23

## 2024-06-17 RX ADMIN — Medication 15 ML: at 08:05

## 2024-06-17 RX ADMIN — GABAPENTIN 400 MG: 400 CAPSULE ORAL at 08:02

## 2024-06-17 RX ADMIN — ONDANSETRON 4 MG: 2 INJECTION INTRAMUSCULAR; INTRAVENOUS at 21:58

## 2024-06-17 RX ADMIN — DIVALPROEX SODIUM 500 MG: 125 CAPSULE ORAL at 14:12

## 2024-06-17 RX ADMIN — WATER 10 MG: 1 INJECTION INTRAMUSCULAR; INTRAVENOUS; SUBCUTANEOUS at 10:10

## 2024-06-17 RX ADMIN — MAGNESIUM SULFATE HEPTAHYDRATE 4 G: 40 INJECTION, SOLUTION INTRAVENOUS at 07:58

## 2024-06-17 RX ADMIN — DIVALPROEX SODIUM 500 MG: 125 CAPSULE ORAL at 21:27

## 2024-06-17 RX ADMIN — ACETAMINOPHEN 975 MG: 325 TABLET, FILM COATED ORAL at 05:45

## 2024-06-17 RX ADMIN — LEVETIRACETAM 1500 MG: 500 TABLET, FILM COATED ORAL at 08:01

## 2024-06-17 RX ADMIN — QUETIAPINE FUMARATE 400 MG: 100 TABLET ORAL at 21:27

## 2024-06-17 RX ADMIN — INSULIN LISPRO 5 UNITS: 100 INJECTION, SOLUTION INTRAVENOUS; SUBCUTANEOUS at 21:20

## 2024-06-17 RX ADMIN — LACOSAMIDE 150 MG: 100 TABLET, FILM COATED ORAL at 08:02

## 2024-06-17 NOTE — PLAN OF CARE
Problem: PAIN - ADULT  Goal: Verbalizes/displays adequate comfort level or baseline comfort level  Description: Interventions:  - Encourage patient to monitor pain and request assistance  - Assess pain using appropriate pain scale  - Administer analgesics based on type and severity of pain and evaluate response  - Implement non-pharmacological measures as appropriate and evaluate response  - Consider cultural and social influences on pain and pain management  - Notify physician/advanced practitioner if interventions unsuccessful or patient reports new pain  Outcome: Progressing     Problem: INFECTION - ADULT  Goal: Absence or prevention of progression during hospitalization  Description: INTERVENTIONS:  - Assess and monitor for signs and symptoms of infection  - Monitor lab/diagnostic results  - Monitor all insertion sites, i.e. indwelling lines, tubes, and drains  - Monitor endotracheal if appropriate and nasal secretions for changes in amount and color  - [place] appropriate cooling/warming therapies per order  - Administer medications as ordered  - Instruct and encourage patient and family to use good hand hygiene technique  - Identify and instruct in appropriate isolation precautions for identified infection/condition  Outcome: Progressing     Problem: SAFETY ADULT  Goal: Patient will remain free of falls  Description: INTERVENTIONS:  - Educate patient/family on patient safety including physical limitations  - Instruct patient to call for assistance with activity   - Consult OT/PT to assist with strengthening/mobility   - Keep Call bell within reach  - Keep bed low and locked with side rails adjusted as appropriate  - Keep care items and personal belongings within reach  - Initiate and maintain comfort rounds  - Make Fall Risk Sign visible to staff  - Offer Toileting every 2 Hours, in advance of need  - Initiate/Maintain bed alarm  - Apply yellow socks and bracelet for high fall risk patients  - Consider  moving patient to room near nurses station  Outcome: Progressing  Goal: Maintain or return to baseline ADL function  Description: INTERVENTIONS:  -  Assess patient's ability to carry out ADLs; assess patient's baseline for ADL function and identify physical deficits which impact ability to perform ADLs (bathing, care of mouth/teeth, toileting, grooming, dressing, etc.)  - Assess/evaluate cause of self-care deficits   - Assess range of motion  - Assess patient's mobility; develop plan if impaired  - Assess patient's need for assistive devices and provide as appropriate  - Encourage maximum independence but intervene and supervise when necessary  - Involve family in performance of ADLs  - Assess for home care needs following discharge   - Consider OT consult to assist with ADL evaluation and planning for discharge  - Provide patient education as appropriate  Outcome: Progressing  Goal: Maintains/Returns to pre admission functional level  Description: INTERVENTIONS:  - Perform AM-PAC 6 Click Basic Mobility/ Daily Activity assessment daily.  - Set and communicate daily mobility goal to care team and patient/family/caregiver.   - Collaborate with rehabilitation services on mobility goals if consulted  - Reposition patient every 2 hours.  - Dangle patient 1 times a day  - Stand patient 1 times a day  - Out of bed to chair 1 times a day   - Out of bed for meals 1 times a day  - Out of bed for toileting  - Record patient progress and toleration of activity level   Outcome: Progressing     Problem: DISCHARGE PLANNING  Goal: Discharge to home or other facility with appropriate resources  Description: INTERVENTIONS:  - Identify barriers to discharge w/patient and caregiver  - Arrange for needed discharge resources and transportation as appropriate  - Identify discharge learning needs (meds, wound care, etc.)  - Arrange for interpretive services to assist at discharge as needed  - Refer to Case Management Department for  coordinating discharge planning if the patient needs post-hospital services based on physician/advanced practitioner order or complex needs related to functional status, cognitive ability, or social support system  Outcome: Progressing     Problem: Knowledge Deficit  Goal: Patient/family/caregiver demonstrates understanding of disease process, treatment plan, medications, and discharge instructions  Description: Complete learning assessment and assess knowledge base.  Interventions:  - Provide teaching at level of understanding  - Provide teaching via preferred learning methods  Outcome: Progressing     Problem: Nutrition/Hydration-ADULT  Goal: Nutrient/Hydration intake appropriate for improving, restoring or maintaining nutritional needs  Description: Monitor and assess patient's nutrition/hydration status for malnutrition. Collaborate with interdisciplinary team and initiate plan and interventions as ordered.  Monitor patient's weight and dietary intake as ordered or per policy. Utilize nutrition screening tool and intervene as necessary. Determine patient's food preferences and provide high-protein, high-caloric foods as appropriate.     INTERVENTIONS:  - Monitor oral intake, urinary output, labs, and treatment plans  - Assess nutrition and hydration status and recommend course of action  - Evaluate amount of meals eaten  - Assist patient with eating if necessary   - Allow adequate time for meals  - Recommend/ encourage appropriate diets, oral nutritional supplements, and vitamin/mineral supplements  - Order, calculate, and assess calorie counts as needed  - Recommend, monitor, and adjust tube feedings and TPN/PPN based on assessed needs  - Assess need for intravenous fluids  - Provide specific nutrition/hydration education as appropriate  - Include patient/family/caregiver in decisions related to nutrition  Outcome: Progressing     Problem: Prexisting or High Potential for Compromised Skin Integrity  Goal: Skin  integrity is maintained or improved  Description: INTERVENTIONS:  - Identify patients at risk for skin breakdown  - Assess and monitor skin integrity  - Assess and monitor nutrition and hydration status  - Monitor labs   - Assess for incontinence   - Turn and reposition patient  - Assist with mobility/ambulation  - Relieve pressure over bony prominences  - Avoid friction and shearing  - Provide appropriate hygiene as needed including keeping skin clean and dry  - Evaluate need for skin moisturizer/barrier cream  - Collaborate with interdisciplinary team   - Patient/family teaching  - Consider wound care consult   Outcome: Progressing

## 2024-06-17 NOTE — PLAN OF CARE
Pudding/puree  1:1 full feed  Slow rate of intake  Small bites/sips  90' upright   Meds crushed padilla royal   Will continue to follow x1-3

## 2024-06-17 NOTE — QUICK NOTE
CT clamp release to drainage.  Some sanguineous drainage noted from previous large bore CT site.

## 2024-06-17 NOTE — PLAN OF CARE
Problem: OCCUPATIONAL THERAPY ADULT  Goal: Performs self-care activities at highest level of function for planned discharge setting.  See evaluation for individualized goals.  Description: Treatment Interventions: Functional transfer training, UE strengthening/ROM, Endurance training, Patient/family training          See flowsheet documentation for full assessment, interventions and recommendations.   Outcome: Progressing  Note: Limitation: Decreased ADL status, Decreased UE strength, Decreased Safe judgement during ADL, Decreased cognition, Decreased endurance, Decreased self-care trans, Decreased high-level ADLs, Decreased fine motor control, Decreased UE ROM  Prognosis: Good  Assessment: Patient participated in Skilled OT session this date with interventions consisting of therapeutic activities to: increase activity tolerance, increase dynamic sit/ stand balance during functional activity , and therapeutic exercises. Patient agreeable to OT treatment session, upon arrival patient was found supine in bed and in no apparent distress. Patient completed bed mobility with min assist of 2 and functional transfers with min assist of 2. Pt sat EOB with sup-CGA for sitting balance. Pt completed 1 STS transfers with min assist of 2 and ambulated to recliner with min assist of 2 utilizing RW. While seated in recliner, pt completed 1 set x 10 reps of BUE exercise to increase strength and endurance. In comparison to previous session, patient with improvements in functional transfers. Patient requiring verbal cues for correct technique, cognitive assistance to anticipate next step, and one step directives. Patient continues to be functioning below baseline level, occupational performance remains limited secondary to factors listed above and increased risk for falls and injury. From OT standpoint, recommendation at time of d/c would be Level II (moderate resource intensity). Patient to benefit from continued Occupational  Therapy treatment while in the hospital to address deficits as defined above and maximize level of functional independence with ADLs and functional mobility.     Rehab Resource Intensity Level, OT: II (Moderate Resource Intensity)        Yanira Lange OTSANTY, OTR/L

## 2024-06-17 NOTE — PLAN OF CARE
Problem: PHYSICAL THERAPY ADULT  Goal: Performs mobility at highest level of function for planned discharge setting.  See evaluation for individualized goals.  Description: Treatment/Interventions: Functional transfer training, LE strengthening/ROM, Therapeutic exercise, Endurance training, Cognitive reorientation, Patient/family training, Bed mobility, Continued evaluation, Spoke to nursing, OT          See flowsheet documentation for full assessment, interventions and recommendations.  Note: Prognosis: Fair  Problem List: Decreased strength, Decreased endurance, Impaired balance, Decreased mobility, Decreased cognition, Pain  Assessment: Pt seen for PT treatment session this date, consisting of ther act focused on bed mobility, transfers, standing/balance posture facilitation, short gait trial from EOB to recliner . Pt performing bed mobility, transfers, min A x2 with use of RW for 3' distance from EOB to recliner. Pt ambulated short distance to recliner. Pt unsteady and requires assist to manage RW. Attempted to have pt complete 2nd ambulation trial and LB exercises, but pt adamently refused despite max verbal cues/encouragement. Pertinent barriers during this session include cognitive status. Current goals and POC established on IE remain appropriate, pt continues to have rehab potential and is making fair progress towards STGs. Pt prognosis for achieving goals is fair, pending pt progress with hospitalization/medical status improvements, and indicated by Stimulability. Pt limited d/t poor orientation and self limiting. Pt continues to be functioning below baseline level, and remains limited 2* factors listed above. PT will continue to see pt during current hospitalization in order to address the deficits listed above and provide interventions consistent w/ POC in effort to achieve STGs. PT recommends level 2, moderate resource intensity upon discharge.  Barriers to Discharge: Inaccessible home environment,  Decreased caregiver support     Rehab Resource Intensity Level, PT: II (Moderate Resource Intensity)    See flowsheet documentation for full assessment.

## 2024-06-17 NOTE — ASSESSMENT & PLAN NOTE
Lab Results   Component Value Date    HGBA1C 6.7 (H) 06/14/2024       Recent Labs     06/14/24  0609 06/14/24  1224 06/14/24  1745   POCGLU 179* 131 146*         Blood Sugar Average: Last 72 hrs:  (P) 156.75      Holding metformin while inpatient   Before meals and at bedtime glucoses with coverage as needed

## 2024-06-17 NOTE — PHYSICAL THERAPY NOTE
"Physical Therapy Treatment Note       06/17/24 0812   PT Last Visit   PT Visit Date 06/17/24   Note Type   Note Type Treatment   Pain Assessment   Pain Assessment Tool FLACC   Pain Score   (pt reported \"no\" to pain)   Pain Rating: FLACC (Rest) - Face 0   Pain Rating: FLACC (Rest) - Legs 0   Pain Rating: FLACC (Rest) - Activity 0   Pain Rating: FLACC (Rest) - Cry 0   Pain Rating: FLACC (Rest) - Consolability 0   Score: FLACC (Rest) 0   Pain Rating: FLACC (Activity) - Face 1   Pain Rating: FLACC (Activity) - Legs 0   Pain Rating: FLACC (Activity) - Activity 0   Pain Rating: FLACC (Activity) - Cry 0   Pain Rating: FLACC (Activity) - Consolability 0   Score: FLACC (Activity) 1   Restrictions/Precautions   Other Precautions Cognitive;Chair Alarm;Bed Alarm;Multiple lines;Telemetry;Fall Risk   General   Chart Reviewed Yes   Response to Previous Treatment Patient unable to report, no changes reported from family or staff   Family/Caregiver Present No   Cognition   Overall Cognitive Status Impaired   Arousal/Participation Alert;Responsive   Attention Attends with cues to redirect   Orientation Level Oriented to person;Disoriented to place;Disoriented to time;Disoriented to situation   Memory Decreased short term memory;Decreased recall of recent events   Following Commands Follows one step commands inconsistently   Comments pt agreeable to PT session with encouragement. pt becoming increasingly agitated as session progressed, repeatedly cursing/ inappropriate gestures/ attempting to hit therapist   Bed Mobility   Supine to Sit 4  Minimal assistance   Additional items Assist x 2;HOB elevated;Bedrails;Increased time required;Verbal cues;LE management   Transfers   Sit to Stand 4  Minimal assistance   Additional items Assist x 2;Increased time required;Verbal cues;Bedrails   Stand to Sit 4  Minimal assistance   Additional items Assist x 2;Increased time required;Verbal cues   Ambulation/Elevation   Gait pattern Decreased foot " clearance;Forward Flexion;Shuffling;Short stride;Step to;Excessively slow;Knees flexed;Decreased toe off;Decreased heel strike   Gait Assistance 4  Minimal assist   Additional items Assist x 2;Verbal cues;Tactile cues   Assistive Device Rolling walker   Distance 3'   Ambulation/Elevation Additional Comments Pt ambulated short distance to recliner. Pt unsteady and requires assist to manage RW. Attempted to have pt complete 2nd ambulation trial but pt adamently refused despite max verbal cues/encouragement   Balance   Static Sitting Fair   Dynamic Sitting Fair -   Static Standing Poor +   Dynamic Standing Poor   Ambulatory Poor   Endurance Deficit   Endurance Deficit Yes   Activity Tolerance   Activity Tolerance Patient limited by fatigue;Treatment limited secondary to agitation   Medical Staff Made Aware Co-treat performed with OT secondary to complex medical condition of patient and regression of functional status from baseline. PT/OT goals were addressed separately.   Nurse Made Aware RN Sera   Assessment   Prognosis Fair   Problem List Decreased strength;Decreased endurance;Impaired balance;Decreased mobility;Decreased cognition;Pain   Assessment Pt seen for PT treatment session this date, consisting of ther act focused on bed mobility, transfers, standing/balance posture facilitation, short gait trial from EOB to recliner . Pt performing bed mobility, transfers, min A x2 with use of RW for 3' distance from EOB to recliner. Pt ambulated short distance to recliner. Pt unsteady and requires assist to manage RW. Attempted to have pt complete 2nd ambulation trial and LB exercises, but pt adamently refused despite max verbal cues/encouragement. Pertinent barriers during this session include cognitive status. Current goals and POC established on IE remain appropriate, pt continues to have rehab potential and is making fair progress towards STGs. Pt prognosis for achieving goals is fair, pending pt progress with  hospitalization/medical status improvements, and indicated by Stimulability. Pt limited d/t poor orientation and self limiting. Pt continues to be functioning below baseline level, and remains limited 2* factors listed above. PT will continue to see pt during current hospitalization in order to address the deficits listed above and provide interventions consistent w/ POC in effort to achieve STGs. PT recommends level 2, moderate resource intensity upon discharge.   Barriers to Discharge Inaccessible home environment;Decreased caregiver support   Goals   Patient Goals none expressed   STG Expiration Date 06/26/24   PT Treatment Day 4   Plan   Treatment/Interventions Functional transfer training;LE strengthening/ROM;Therapeutic exercise;Endurance training;Cognitive reorientation;Patient/family training;Bed mobility;Continued evaluation;Spoke to nursing;OT   Progress Slow progress, cognitive deficits   PT Frequency 3-5x/wk   Discharge Recommendation   Rehab Resource Intensity Level, PT II (Moderate Resource Intensity)   AM-PAC Basic Mobility Inpatient   Turning in Flat Bed Without Bedrails 2   Lying on Back to Sitting on Edge of Flat Bed Without Bedrails 2   Moving Bed to Chair 2   Standing Up From Chair Using Arms 2   Walk in Room 2   Climb 3-5 Stairs With Railing 1   Basic Mobility Inpatient Raw Score 11   Basic Mobility Standardized Score 30.25   [facility] Highest Level Of Mobility   -HLM Goal 4: Move to chair/commode   -HLM Achieved 4: Move to chair/commode   Education   Education Provided Mobility training;Assistive device   Patient Reinforcement needed   End of Consult   Patient Position at End of Consult Bedside chair;Bed/Chair alarm activated;All needs within reach       Merlene Reyes, PT, DPT

## 2024-06-17 NOTE — ASSESSMENT & PLAN NOTE
S/p 10 Fr right chest tube, discontinued on 6/8  34 F chest tube placed 6/9 due to hemothorax and persistent small pneumothorax  IR consulted for pigtail placement, placed 6/14  Trial of TPA/dornase   Chest tubes to suction overnight  Monitor output from chest tube

## 2024-06-17 NOTE — QUICK NOTE
Chest tube port cleaned in sterile fashion. Alteplase and dornase instilled and chest tube clamped at 1005. Plan to unclamp at 1105 with nursing team made aware.    Addendum: Patient self-discontinued clamping at 1045

## 2024-06-17 NOTE — SPEECH THERAPY NOTE
Speech Language/Pathology     Speech/Language Pathology Progress Note     Patient Name: Jairon Oconnell    Today's Date: 6/17/2024     Problem List  Principal Problem:    Cardiac arrest (HCC)  Active Problems:    Generalized convulsive epilepsy (HCC)    HLD (hyperlipidemia)    HTN (hypertension)    Acquired hypothyroidism    Behavior concern in adult    Ambulatory dysfunction    Dysphagia    Closed traumatic fracture of ribs of right side with pneumothorax    Acute respiratory failure with hypoxia and hypercapnia (HCC)    DM (diabetes mellitus) (HCC)      Subjective:  Patient received awake, alert, seated in bedside chair following PT/OT; somewhat agitated,  Breakfast tray set up and presented by clinician    Previous/current diet: puree/pudding thick     Objective:  The following consistencies were tested teaspoon quantities of puree solids, pudding thick liquids.  Material provided at a slow rate, 1/2 tsp quantities.  Patient demonstrates adequate bolus retrieval, prolonged transfer.  Mild retention along lingual surface which pt able to follow cues for multiple swallows to clear.  Observed taking meds crushed w/ puree in addition to consumption of breakfast.  No s/s of aspiration, audible congestion or changes in vocal quality.       Assessment:  Ongoing improvement; no s/s of aspiration today.  Improvement in secretion mgmt noted as well.      Plan:  Pudding/puree  1:1 full feed  Slow rate of intake  Small bites/sips  90' upright   Meds crushed c puree   Will continue to follow x1-3      Cheri Green MS, CCC-SLP  Speech-Language Pathologist  PA #[id number]  [place] #[id number]

## 2024-06-17 NOTE — PROGRESS NOTES
[facility]  Progress Note  Name: Jairon Oconnell I  MRN: [id number]  Unit/Bed#: ICU 10 I Date of Admission: 6/3/2024   Date of Service: 6/17/2024 I Hospital Day: 14    Assessment & Plan   * Cardiac arrest (HCC)  Assessment & Plan  Thought to be secondary to hypoxia secondary to airway obstruction in the setting of chocking  Now post self extubation 6/4 and at baseline mental status  WIth residual AHRF, 2/2 rib fractures vs aspiration as below  ECHO- EF 55%    Acute respiratory failure with hypoxia and hypercapnia ([facility])  Assessment & Plan  S/p self extubation 6/4  With residual AHRF in setting of rib fractures, likely aspiration  Continue Dysphagia diet/aspiration precautions  Now on room air  Oxygen if needed to maintain Spo2 >92%    Generalized convulsive epilepsy ([facility])  Assessment & Plan  Continue home medications zonisamide, Keppra, Vimpat, Depakote, gabapentin  Seizure precautions    Closed traumatic fracture of ribs of right side with pneumothorax  Assessment & Plan  S/p 10 Fr right chest tube, discontinued on 6/8  32 F chest tube placed 6/9 due to hemothorax and persistent small pneumothorax  IR consulted for pigtail placement, placed 6/14  Trial of TPA/dornase   Chest tubes to suction overnight  Monitor output from chest tube    Dysphagia  Assessment & Plan  Aspiration precautions  Dysphagia 1 pureed/pudding thick, honey thick liquids  Appreciate speech recommendations       Behavior concern in adult  Assessment & Plan  Continue home meds    DM (diabetes mellitus) ([facility])  Assessment & Plan  Lab Results   Component Value Date    HGBA1C 6.7 (H) 06/14/2024       Recent Labs     06/14/24  0609 06/14/24  1224 06/14/24  1745   POCGLU 179* 131 146*         Blood Sugar Average: Last 72 hrs:  (P) 156.75      Holding metformin while inpatient   Before meals and at bedtime glucoses with coverage as needed    Ambulatory dysfunction  Assessment & Plan  PT/OT when appropriate  Fall  precautions    Acquired hypothyroidism  Assessment & Plan  Continue home levothyroxine    HTN (hypertension)  Assessment & Plan  Consider restarting lisinopril    HLD (hyperlipidemia)  Assessment & Plan  Continue home statin             Disposition: Stepdown Level 1    ICU Core Measures     A: Assess, Prevent, and Manage Pain Has pain been assessed? Yes  Need for changes to pain regimen? No   B: Both SAT/SAT  N/A   C: Choice of Sedation RASS Goal: 0 Alert and Calm  Need for changes to sedation or analgesia regimen? No   D: Delirium CAM-ICU: Unable to perform secondary to Dementia or other chronic cognitive dysfunction   E: Early Mobility  Plan for early mobility? Yes   F: Family Engagement Plan for family engagement today? Yes       Review of Invasive Devices:            Prophylaxis:  VTE VTE covered by:  enoxaparin, Subcutaneous, 40 mg at 06/16/24 0936       Stress Ulcer  not ordered         Significant 24hr Events     24hr events: TPA/dornase instilled via right lateral chest tube.  There was sanguineous drainage from previous large bore CT site.  No acute events overnight        Subjective   Review of Systems: See HPI for Review of Systems     Objective                            Vitals I/O      Most Recent Min/Max in 24hrs   Temp 98.4 °F (36.9 °C) Temp  Min: 98.4 °F (36.9 °C)  Max: 98.4 °F (36.9 °C)   Pulse 104 Pulse  Min: 93  Max: 108   Resp 18 Resp  Min: 14  Max: 27   /60 BP  Min: 117/60  Max: 149/68   O2 Sat 93 % SpO2  Min: 93 %  Max: 94 %      Intake/Output Summary (Last 24 hours) at 6/17/2024 0223  Last data filed at 6/17/2024 0201  Gross per 24 hour   Intake 350 ml   Output 2020 ml   Net -1670 ml       Diet Dysphagia/Modified Consistency; Dysphagia 1-Pureed; Pudding Thick Liquid; Honey Thick Liquid    Invasive Monitoring           Physical Exam   Physical Exam  Eyes:      General: Lids are normal.      Extraocular Movements: Extraocular movements intact.      Conjunctiva/sclera: Conjunctivae normal.    Skin:     General: Skin is warm and dry.   HENT:      Head: Normocephalic and atraumatic.   Neck:      Trachea: Trachea normal.   Cardiovascular:      Rate and Rhythm: Tachycardia present.      Pulses: Normal pulses.   Musculoskeletal:      Cervical back: Normal range of motion.      Right lower leg: No edema.      Left lower leg: No edema.   Abdominal:      Palpations: Abdomen is soft.      Tenderness: There is no abdominal tenderness.   Constitutional:       General: He is awake.      Appearance: He is overweight. He is ill-appearing.   Pulmonary:      Effort: Pulmonary effort is normal.      Breath sounds: Decreased breath sounds present.   Chest:       Psychiatric:         Cognition and Memory: Cognition is impaired.   Neurological:      Mental Status: He is alert.      GCS: GCS eye subscore is 4. GCS verbal subscore is 5. GCS motor subscore is 6.      Sensory: Sensation is intact.            Diagnostic Studies      EKG: ST  Imaging:   XR chest portable ICU   Final Result      Pleural drainage catheter over the lateral right lung base with persistent small right hydropneumothorax and moderate bibasilar atelectasis.      Mild bilateral scar.            Workstation performed: [id number]         XR chest portable ICU   Final Result      Persistent small right hydropneumothorax and right base atelectasis.            Workstation performed: [id number]         CXR pa & lateral in AM   Final Result      New pleural drainage catheter in the posterior right costophrenic sulcus with slight decrease in loculated right effusion with persistent right base atelectasis.      New small right apical pneumothorax.            Workstation performed: [id number]         IR chest tube placement   Final Result   Impression: Successful ultrasound-guided placement of 14 Welsh right chest tube      I directly communicated the findings to Dr. Hasmukh Chacon from critical care during the procedure         Workstation performed:  [id number]         CT chest w contrast   Final Result      Right chest tube pulled back, question pleural versus intraparenchymal, but with decrease in the right hemopneumothorax.      Decrease in left pleural effusion, small.      Improving bilateral lower lobe atelectasis.      Improving bilateral upper lobe consolidation/groundglass opacity which may be due to aspiration.      Redemonstration of acute bilateral anterior rib fractures due to CPR..         Workstation performed: [id number]         XR chest portable ICU   Final Result      Moderate pulmonary vascular congestion. Persistent moderate sized right pleural effusion with stably positioned right basilar chest tube. No sizable pneumothorax. Small left pleural effusion.            Workstation performed: [id number]         XR chest portable ICU   Final Result      Stable small effusions and bibasilar lung opacities which are nonspecific, possibly just atelectasis. Edema or pneumonia not excluded.      Right chest tube appears stable. No pneumothorax visible at this time.      There is some gaseous distention of left upper quadrant bowel. Correlate for any abdominal pain      The study was marked in EPIC for immediate notification.            Workstation performed: [id number]         CT chest wo contrast   Final Result      1.  Question intraparenchymal location of right-sided chest tube. The tip is medially located and surrounded by heterogeneous lucency that resembles lung parenchyma. However, it is difficult to distinguish the heterogeneous right pleural collection from    the atelectatic right lower lobe in absence of intravenous contrast. Consider repeat CT with intravenous contrast.   2.  Moderate complex right hydropneumothorax and small left pleural effusion.   3.  Diffuse pulmonary opacities may represent any combination of atelectasis, contusions, and/or infection.         The study was marked in EPIC for immediate notification.      Workstation  performed: [id number]         XR chest portable ICU   Final Result      Right chest tube with sidehole likely outside the pleural space. A minuscule residual right apical pneumothorax is suggested.      Bibasilar opacities may represent atelectasis, pneumonia not excluded in the appropriate clinical setting.      Persistent pulmonary venous congestion with small pleural effusions. Asymmetric airspace opacity peripheral left mid to upper lung could represent asymmetric edema and/or pneumonia.         Workstation performed: [id number]         XR chest portable ICU   Final Result      No acute cardiopulmonary disease.            Workstation performed: [id number]         XR chest portable ICU   Final Result      Persistent right chest tube with stable small right pneumothorax, small right effusion, and bibasilar atelectasis with bilateral groundglass opacity.            Workstation performed: [id number]         XR chest portable ICU   Final Result      Right chest tube pulled back with small right pneumothorax.      Persistent bilateral consolidation and groundglass opacity with bilateral pleural effusions and bibasilar atelectasis.            Workstation performed: [id number]         CT chest abdomen pelvis wo contrast   Final Result      Since June 9, 2024 at 13:20:   1.  New right-sided chest tube with tip terminating in the right retrocrural space.  New right hydropneumothorax with small air component and moderate-to-large fluid component. The fluid component has decreased since placement of the chest tube. Complete    right middle and lower lobe atelectasis is not significantly changed. New trace pneumoperitoneum.   2.  No significant change in moderate left pleural effusion; however, there is increase in now near complete left lower lobe atelectasis.   3.  Unchanged bilateral upper lung groundglass opacities, which likely represent pneumonia.            I personally discussed this study with Dr. Banerjee on  6/9/2024 5:02 PM.         Workstation performed: [id number]         XR chest portable ICU   Final Result      Right chest tube projecting over the right mid abdomen with decrease in right effusion. No visible pneumothorax.      Moderate bilateral groundglass opacity, and right greater than left bibasilar atelectasis. Pneumonia not excluded in the appropriate clinical setting.            Workstation performed: [id number]         CT chest abdomen pelvis w contrast   Final Result      1.  Large right pleural effusion with complete right middle and lower lobe atelectasis.   2.  Moderate sized left pleural effusion with partial left lower lobe atelectasis.   3.  New groundglass opacities in the bilateral upper lobes, right greater left, likely represent pneumonia.   4.  No acute abdominopelvic findings.            I personally discussed this study with Dr. Banerjee on 6/9/2024 5:02 PM.            Workstation performed: [id number]         XR chest portable ICU   Final Result      No visible pneumothorax      Redemonstration of right greater than left consolidation, effusions, and atelectasis.      Acute bilateral rib fractures on CT not visible.            Workstation performed: [id number]         XR chest portable ICU   Final Result      Right pigtail removed with no pneumothorax.      Increase in right pleural effusion, moderate, with right greater than left consolidation, likely atelectasis. Pneumonia/aspiration not excluded.      Acute right rib fractures not visible.            Workstation performed: [id number]         XR chest portable ICU   Final Result      Persistent CHF. Worsening right upper lobe infiltrate. Persistent retrocardiac density.            Workstation performed: [id number]         XR chest portable ICU   Final Result      Worsened pulmonary edema. New dense consolidation in the right midlung, possibly aspiration.         Workstation performed: [id number]         FL barium swallow video w speech    Final Result      XR chest portable ICU   Final Result   No pneumothorax visualized.   Tubes and lines, as above. Consider retraction of the endotracheal tube by 1.6 cm.   Perihilar patchy airspace opacities. Consolidations in the posterior medial lower lobes.            Workstation performed: [id number]         CT head without contrast   Final Result      No acute intracranial abnormality.                  Workstation performed: [id number]         CT chest with contrast   Final Result      Bilateral lower lobe airspace consolidations as well as tree-in-bud and groundglass opacities most conspicuous at the right upper lobe compatible with bronchopneumonia      Numerous bilateral anterior rib fractures.               Workstation performed: [id number]         XR chest 1 view portable   ED Interpretation   Reexpansion of R lung, adequate positioning of R chest tube.       Final Result      Since Ramona 3, 2000 2420:42:   1.  New right chest tube with decrease size of the right pneumothorax. Assessment of the pneumothorax is limited due to and overlying pacer pad, though on the subsequent CT chest, the pneumothorax is trace.   2.  Right lower lobe atelectasis.   3.  Persistent slight high positioning of enteric tube with sidehole at the expected position of the diaphragmatic hiatus. Advancement by approximately 4 cm is advised.         I personally discussed this study with Dr. Carias on 6/4/2024 10:56 AM.            Workstation performed: [id number]         XR chest 1 view portable   ED Interpretation   Large R PTX  ETT at demetrius  OGT subdiaphragmatic.       Final Result      Large right pneumothorax.      Endotracheal tube at the level of the demetrius, recommend retraction.      Enteric tube with tip at the level of the gastroesophageal junction, sideport in the lower esophagus.   Recommend advancement.      Multiple right anterior minimally displaced rib fractures.      Findings concur with the preliminary report by the  referring clinician already in PACS and/or our electronic record EPIC.               Workstation performed: [id number]               Medications:  Scheduled PRN   acetaminophen, 975 mg, Q8H RONALD  chlorhexidine, 15 mL, Q12H RONALD  cyanocobalamin, 500 mcg, Daily  divalproex sodium, 500 mg, Q8H RONALD  enoxaparin, 40 mg, Q24H RONALD  folic acid, 1 mg, Daily  gabapentin, 400 mg, TID  lacosamide, 150 mg, QAM  lacosamide, 200 mg, HS  levETIRAcetam, 1,500 mg, Q12H RONALD  levothyroxine, 150 mcg, Daily  lidocaine, 2 patch, Daily  loratadine, 10 mg, Daily  magnesium Oxide, 400 mg, BID  Multivitamin, 15 mL, Daily  polyethylene glycol, 17 g, Daily  pravastatin, 80 mg, Daily With Dinner  QUEtiapine, 150 mg, BID  QUEtiapine, 400 mg, HS  senna-docusate sodium, 2 tablet, BID  zonisamide, 300 mg, HS      ondansetron, 4 mg, Q6H PRN  oxyCODONE, 2.5 mg, Q6H PRN   Or  oxyCODONE, 5 mg, Q6H PRN       Continuous          Labs:    CBC    Recent Labs     06/15/24  0553 06/16/24  0452   WBC 15.42* 12.54*   HGB 8.8* 7.7*   HCT 27.5* 23.6*   * 437*     BMP    Recent Labs     06/15/24  0553 06/16/24  0452   SODIUM 139 137   K 4.6 4.3    104   CO2 28 27   AGAP 7 6   BUN 18 19   CREATININE 0.57* 0.53*   CALCIUM 9.1 8.5       Coags    No recent results     Additional Electrolytes  Recent Labs     06/15/24  0553 06/16/24  0452   MG 1.6* 1.7*   PHOS 4.1 3.6          Blood Gas    No recent results  No recent results LFTs  No recent results    Infectious  No recent results  Glucose  Recent Labs     06/15/24  0553 06/16/24  0452   GLUC 152* 168*               HUNTER Colby

## 2024-06-18 ENCOUNTER — APPOINTMENT (INPATIENT)
Dept: CT IMAGING | Facility: HOSPITAL | Age: 59
DRG: 208 | End: 2024-06-18
Payer: MEDICARE

## 2024-06-18 PROBLEM — J96.02 ACUTE RESPIRATORY FAILURE WITH HYPOXIA AND HYPERCAPNIA (HCC): Status: RESOLVED | Noted: 2024-06-03 | Resolved: 2024-06-18

## 2024-06-18 PROBLEM — J96.01 ACUTE RESPIRATORY FAILURE WITH HYPOXIA AND HYPERCAPNIA (HCC): Status: RESOLVED | Noted: 2024-06-03 | Resolved: 2024-06-18

## 2024-06-18 LAB
GLUCOSE SERPL-MCNC: 146 MG/DL (ref 65–140)
GLUCOSE SERPL-MCNC: 202 MG/DL (ref 65–140)
GLUCOSE SERPL-MCNC: 223 MG/DL (ref 65–140)
GLUCOSE SERPL-MCNC: 269 MG/DL (ref 65–140)
GLUCOSE SERPL-MCNC: 304 MG/DL (ref 65–140)

## 2024-06-18 PROCEDURE — 99223 1ST HOSP IP/OBS HIGH 75: CPT | Performed by: INTERNAL MEDICINE

## 2024-06-18 PROCEDURE — 99233 SBSQ HOSP IP/OBS HIGH 50: CPT | Performed by: STUDENT IN AN ORGANIZED HEALTH CARE EDUCATION/TRAINING PROGRAM

## 2024-06-18 PROCEDURE — 82948 REAGENT STRIP/BLOOD GLUCOSE: CPT

## 2024-06-18 PROCEDURE — 71250 CT THORAX DX C-: CPT

## 2024-06-18 RX ORDER — INSULIN LISPRO 100 [IU]/ML
3 INJECTION, SOLUTION INTRAVENOUS; SUBCUTANEOUS
Status: DISCONTINUED | OUTPATIENT
Start: 2024-06-18 | End: 2024-06-21 | Stop reason: HOSPADM

## 2024-06-18 RX ORDER — BENZTROPINE MESYLATE 1 MG/1
0.5 TABLET ORAL DAILY
Status: DISCONTINUED | OUTPATIENT
Start: 2024-06-18 | End: 2024-06-21 | Stop reason: HOSPADM

## 2024-06-18 RX ORDER — INSULIN GLARGINE 100 [IU]/ML
5 INJECTION, SOLUTION SUBCUTANEOUS
Status: DISCONTINUED | OUTPATIENT
Start: 2024-06-18 | End: 2024-06-21 | Stop reason: HOSPADM

## 2024-06-18 RX ADMIN — INSULIN LISPRO 4 UNITS: 100 INJECTION, SOLUTION INTRAVENOUS; SUBCUTANEOUS at 15:05

## 2024-06-18 RX ADMIN — POLYETHYLENE GLYCOL 3350 17 G: 17 POWDER, FOR SOLUTION ORAL at 09:21

## 2024-06-18 RX ADMIN — QUETIAPINE FUMARATE 400 MG: 100 TABLET ORAL at 21:08

## 2024-06-18 RX ADMIN — PRAVASTATIN SODIUM 80 MG: 80 TABLET ORAL at 18:08

## 2024-06-18 RX ADMIN — INSULIN LISPRO 3 UNITS: 100 INJECTION, SOLUTION INTRAVENOUS; SUBCUTANEOUS at 15:05

## 2024-06-18 RX ADMIN — ONDANSETRON 4 MG: 2 INJECTION INTRAMUSCULAR; INTRAVENOUS at 04:20

## 2024-06-18 RX ADMIN — GABAPENTIN 400 MG: 400 CAPSULE ORAL at 15:08

## 2024-06-18 RX ADMIN — GABAPENTIN 400 MG: 400 CAPSULE ORAL at 09:22

## 2024-06-18 RX ADMIN — Medication 15 ML: at 09:26

## 2024-06-18 RX ADMIN — LEVETIRACETAM 1500 MG: 500 TABLET, FILM COATED ORAL at 21:08

## 2024-06-18 RX ADMIN — ZONISAMIDE 300 MG: 100 CAPSULE ORAL at 21:10

## 2024-06-18 RX ADMIN — DIVALPROEX SODIUM 500 MG: 125 CAPSULE ORAL at 21:08

## 2024-06-18 RX ADMIN — LEVETIRACETAM 1500 MG: 500 TABLET, FILM COATED ORAL at 09:22

## 2024-06-18 RX ADMIN — SENNOSIDES AND DOCUSATE SODIUM 2 TABLET: 50; 8.6 TABLET ORAL at 17:56

## 2024-06-18 RX ADMIN — INSULIN LISPRO 3 UNITS: 100 INJECTION, SOLUTION INTRAVENOUS; SUBCUTANEOUS at 17:56

## 2024-06-18 RX ADMIN — GABAPENTIN 400 MG: 400 CAPSULE ORAL at 21:08

## 2024-06-18 RX ADMIN — CYANOCOBALAMIN TAB 500 MCG 500 MCG: 500 TAB at 09:22

## 2024-06-18 RX ADMIN — Medication 400 MG: at 17:55

## 2024-06-18 RX ADMIN — Medication 400 MG: at 09:22

## 2024-06-18 RX ADMIN — ACETAMINOPHEN 975 MG: 325 TABLET, FILM COATED ORAL at 05:27

## 2024-06-18 RX ADMIN — LORATADINE 10 MG: 10 TABLET ORAL at 09:22

## 2024-06-18 RX ADMIN — SENNOSIDES AND DOCUSATE SODIUM 2 TABLET: 50; 8.6 TABLET ORAL at 09:23

## 2024-06-18 RX ADMIN — INSULIN GLARGINE 5 UNITS: 100 INJECTION, SOLUTION SUBCUTANEOUS at 21:09

## 2024-06-18 RX ADMIN — FOLIC ACID 1 MG: 1 TABLET ORAL at 09:21

## 2024-06-18 RX ADMIN — QUETIAPINE FUMARATE 150 MG: 100 TABLET ORAL at 09:21

## 2024-06-18 RX ADMIN — BENZTROPINE MESYLATE 0.5 MG: 1 TABLET ORAL at 15:06

## 2024-06-18 RX ADMIN — DIVALPROEX SODIUM 500 MG: 125 CAPSULE ORAL at 05:27

## 2024-06-18 RX ADMIN — LACOSAMIDE 150 MG: 100 TABLET, FILM COATED ORAL at 09:22

## 2024-06-18 RX ADMIN — DIVALPROEX SODIUM 500 MG: 125 CAPSULE ORAL at 15:08

## 2024-06-18 RX ADMIN — LIDOCAINE 5% 2 PATCH: 700 PATCH TOPICAL at 09:23

## 2024-06-18 RX ADMIN — INSULIN LISPRO 2 UNITS: 100 INJECTION, SOLUTION INTRAVENOUS; SUBCUTANEOUS at 09:26

## 2024-06-18 RX ADMIN — LEVOTHYROXINE SODIUM 150 MCG: 0.15 TABLET ORAL at 09:23

## 2024-06-18 RX ADMIN — ACETAMINOPHEN 975 MG: 325 TABLET, FILM COATED ORAL at 21:07

## 2024-06-18 RX ADMIN — ACETAMINOPHEN 975 MG: 325 TABLET, FILM COATED ORAL at 15:08

## 2024-06-18 RX ADMIN — QUETIAPINE FUMARATE 150 MG: 100 TABLET ORAL at 17:55

## 2024-06-18 RX ADMIN — ENOXAPARIN SODIUM 40 MG: 40 INJECTION SUBCUTANEOUS at 09:22

## 2024-06-18 RX ADMIN — LACOSAMIDE 200 MG: 100 TABLET, FILM COATED ORAL at 21:08

## 2024-06-18 NOTE — ASSESSMENT & PLAN NOTE
Appreciate PT OT support  Per case management rounds this morning, the patient would do better in the group home setting and thus the goals are this on discharge

## 2024-06-18 NOTE — PROGRESS NOTES
[facility]  Progress Note  Name: Jairon Oconnell I  MRN: [id number]  Unit/Bed#: ICU 10 I Date of Admission: 6/3/2024   Date of Service: 6/18/2024 I Hospital Day: 15    Assessment & Plan   * Cardiac arrest (HCC)  Assessment & Plan  Thought to be secondary to hypoxia secondary to airway obstruction in the setting of chocking  Now post self extubation 6/4 and at baseline mental status  WIth residual AHRF, 2/2 rib fractures vs aspiration as below  ECHO- EF 55%    Acute respiratory failure with hypoxia and hypercapnia (Formerly [facility])  Assessment & Plan  S/p self extubation 6/4  With residual AHRF in setting of rib fractures, likely aspiration  Continue Dysphagia diet/aspiration precautions  Now on room air  Oxygen if needed to maintain Spo2 >92%    Generalized convulsive epilepsy (Formerly [facility])  Assessment & Plan  Continue home medications zonisamide, Keppra, Vimpat, Depakote, gabapentin  Seizure precautions    Closed traumatic fracture of ribs of right side with pneumothorax  Assessment & Plan  S/p 10 Fr right chest tube, discontinued on 6/8  32 F chest tube placed 6/9 due to hemothorax and persistent small pneumothorax  IR consulted for pigtail placement, placed 6/14  Trial of TPA/dornase   Chest tubes to suction overnight  Monitor output from chest tube  Repeat CT to evaluate effusion today     Dysphagia  Assessment & Plan  Aspiration precautions  Dysphagia 1 pureed/pudding thick, honey thick liquids  Appreciate speech recommendations       Behavior concern in adult  Assessment & Plan  Continue home meds    DM (diabetes mellitus) (Formerly [facility])  Assessment & Plan  Lab Results   Component Value Date    HGBA1C 6.7 (H) 06/14/2024       Recent Labs     06/17/24  1111 06/17/24  1634 06/17/24  2101   POCGLU 254* 275* 313*         Blood Sugar Average: Last 72 hrs:  (P) 280.0300309614330035      Holding metformin while inpatient   Before meals and at bedtime glucoses with coverage as needed    Ambulatory dysfunction  Assessment &  Plan  PT/OT when appropriate  Fall precautions    Acquired hypothyroidism  Assessment & Plan  Continue home levothyroxine    HTN (hypertension)  Assessment & Plan  Consider restarting lisinopril    HLD (hyperlipidemia)  Assessment & Plan  Continue home statin             Disposition: Med Surg    ICU Core Measures     A: Assess, Prevent, and Manage Pain Has pain been assessed? Yes  Need for changes to pain regimen? No   B: Both SAT/SAT  N/A   C: Choice of Sedation RASS Goal: 0 Alert and Calm  Need for changes to sedation or analgesia regimen? No   D: Delirium CAM-ICU: Unable to perform secondary to Dementia or other chronic cognitive dysfunction   E: Early Mobility  Plan for early mobility? Yes   F: Family Engagement Plan for family engagement today? Yes       Review of Invasive Devices:            Prophylaxis:  VTE VTE covered by:  enoxaparin, Subcutaneous, 40 mg at 06/17/24 0801       Stress Ulcer  not ordered         Significant 24hr Events     24hr events: TPA/dornase overnight.  Tolerated well.  No acute events      Subjective   Review of Systems: See HPI for Review of Systems     Objective                            Vitals I/O      Most Recent Min/Max in 24hrs   Temp 99.1 °F (37.3 °C) Temp  Min: 97.7 °F (36.5 °C)  Max: 99.1 °F (37.3 °C)   Pulse (!) 110 Pulse  Min: 98  Max: 110   Resp 18 Resp  Min: 15  Max: 20   BP 90/54 BP  Min: 90/54  Max: 128/70   O2 Sat 91 % SpO2  Min: 91 %  Max: 97 %      Intake/Output Summary (Last 24 hours) at 6/18/2024 0016  Last data filed at 6/18/2024 0000  Gross per 24 hour   Intake --   Output 3005 ml   Net -3005 ml       Diet Dysphagia/Modified Consistency; Dysphagia 1-Pureed; Pudding Thick Liquid; Honey Thick Liquid    Invasive Monitoring           Physical Exam   Physical Exam  Eyes:      General: Lids are normal.      Extraocular Movements: Extraocular movements intact.      Conjunctiva/sclera: Conjunctivae normal.   Skin:     General: Skin is warm and dry.   HENT:      Head:  Normocephalic and atraumatic.   Neck:      Trachea: Trachea normal.   Cardiovascular:      Rate and Rhythm: Tachycardia present.      Pulses: Normal pulses.   Musculoskeletal:      Cervical back: Normal range of motion.      Right lower leg: No edema.      Left lower leg: No edema.   Abdominal:      Palpations: Abdomen is soft.      Tenderness: There is no abdominal tenderness.   Constitutional:       General: He is awake.      Appearance: He is overweight. He is ill-appearing.   Pulmonary:      Breath sounds: Rhonchi present.   Chest:       Psychiatric:         Cognition and Memory: Cognition is impaired.         Judgment: Judgment is impulsive.   Neurological:      General: No focal deficit present.      Mental Status: He is alert.      GCS: GCS eye subscore is 4. GCS verbal subscore is 3. GCS motor subscore is 6.            Diagnostic Studies      EKG: ST   Imaging:   XR chest portable ICU   Final Result      Pleural drainage catheter over the lateral right lung base with persistent small right hydropneumothorax and moderate bibasilar atelectasis.      Mild bilateral scar.            Workstation performed: [id number]         XR chest portable ICU   Final Result      Persistent small right hydropneumothorax and right base atelectasis.            Workstation performed: [id number]         CXR pa & lateral in AM   Final Result      New pleural drainage catheter in the posterior right costophrenic sulcus with slight decrease in loculated right effusion with persistent right base atelectasis.      New small right apical pneumothorax.            Workstation performed: [id number]         IR chest tube placement   Final Result   Impression: Successful ultrasound-guided placement of 14 [place] right chest tube      I directly communicated the findings to Dr. Hasmukh Chacon from critical care during the procedure         Workstation performed: [id number]         CT chest w contrast   Final Result      Right chest tube  pulled back, question pleural versus intraparenchymal, but with decrease in the right hemopneumothorax.      Decrease in left pleural effusion, small.      Improving bilateral lower lobe atelectasis.      Improving bilateral upper lobe consolidation/groundglass opacity which may be due to aspiration.      Redemonstration of acute bilateral anterior rib fractures due to CPR..         Workstation performed: [id number]         XR chest portable ICU   Final Result      Moderate pulmonary vascular congestion. Persistent moderate sized right pleural effusion with stably positioned right basilar chest tube. No sizable pneumothorax. Small left pleural effusion.            Workstation performed: [id number]         XR chest portable ICU   Final Result      Stable small effusions and bibasilar lung opacities which are nonspecific, possibly just atelectasis. Edema or pneumonia not excluded.      Right chest tube appears stable. No pneumothorax visible at this time.      There is some gaseous distention of left upper quadrant bowel. Correlate for any abdominal pain      The study was marked in EPIC for immediate notification.            Workstation performed: [id number]         CT chest wo contrast   Final Result      1.  Question intraparenchymal location of right-sided chest tube. The tip is medially located and surrounded by heterogeneous lucency that resembles lung parenchyma. However, it is difficult to distinguish the heterogeneous right pleural collection from    the atelectatic right lower lobe in absence of intravenous contrast. Consider repeat CT with intravenous contrast.   2.  Moderate complex right hydropneumothorax and small left pleural effusion.   3.  Diffuse pulmonary opacities may represent any combination of atelectasis, contusions, and/or infection.         The study was marked in EPIC for immediate notification.      Workstation performed: [id number]         XR chest portable ICU   Final Result      Right  chest tube with sidehole likely outside the pleural space. A minuscule residual right apical pneumothorax is suggested.      Bibasilar opacities may represent atelectasis, pneumonia not excluded in the appropriate clinical setting.      Persistent pulmonary venous congestion with small pleural effusions. Asymmetric airspace opacity peripheral left mid to upper lung could represent asymmetric edema and/or pneumonia.         Workstation performed: [id number]         XR chest portable ICU   Final Result      No acute cardiopulmonary disease.            Workstation performed: [id number]         XR chest portable ICU   Final Result      Persistent right chest tube with stable small right pneumothorax, small right effusion, and bibasilar atelectasis with bilateral groundglass opacity.            Workstation performed: [id number]         XR chest portable ICU   Final Result      Right chest tube pulled back with small right pneumothorax.      Persistent bilateral consolidation and groundglass opacity with bilateral pleural effusions and bibasilar atelectasis.            Workstation performed: [id number]         CT chest abdomen pelvis wo contrast   Final Result      Since June 9, 2024 at 13:20:   1.  New right-sided chest tube with tip terminating in the right retrocrural space.  New right hydropneumothorax with small air component and moderate-to-large fluid component. The fluid component has decreased since placement of the chest tube. Complete    right middle and lower lobe atelectasis is not significantly changed. New trace pneumoperitoneum.   2.  No significant change in moderate left pleural effusion; however, there is increase in now near complete left lower lobe atelectasis.   3.  Unchanged bilateral upper lung groundglass opacities, which likely represent pneumonia.            I personally discussed this study with Dr. Banerjee on 6/9/2024 5:02 PM.         Workstation performed: [id number]         XR chest  portable ICU   Final Result      Right chest tube projecting over the right mid abdomen with decrease in right effusion. No visible pneumothorax.      Moderate bilateral groundglass opacity, and right greater than left bibasilar atelectasis. Pneumonia not excluded in the appropriate clinical setting.            Workstation performed: [id number]         CT chest abdomen pelvis w contrast   Final Result      1.  Large right pleural effusion with complete right middle and lower lobe atelectasis.   2.  Moderate sized left pleural effusion with partial left lower lobe atelectasis.   3.  New groundglass opacities in the bilateral upper lobes, right greater left, likely represent pneumonia.   4.  No acute abdominopelvic findings.            I personally discussed this study with Dr. Banerjee on 6/9/2024 5:02 PM.            Workstation performed: [id number]         XR chest portable ICU   Final Result      No visible pneumothorax      Redemonstration of right greater than left consolidation, effusions, and atelectasis.      Acute bilateral rib fractures on CT not visible.            Workstation performed: [id number]         XR chest portable ICU   Final Result      Right pigtail removed with no pneumothorax.      Increase in right pleural effusion, moderate, with right greater than left consolidation, likely atelectasis. Pneumonia/aspiration not excluded.      Acute right rib fractures not visible.            Workstation performed: [id number]         XR chest portable ICU   Final Result      Persistent CHF. Worsening right upper lobe infiltrate. Persistent retrocardiac density.            Workstation performed: [id number]         XR chest portable ICU   Final Result      Worsened pulmonary edema. New dense consolidation in the right midlung, possibly aspiration.         Workstation performed: [id number]         FL barium swallow video w speech   Final Result      XR chest portable ICU   Final Result   No pneumothorax  visualized.   Tubes and lines, as above. Consider retraction of the endotracheal tube by 1.6 cm.   Perihilar patchy airspace opacities. Consolidations in the posterior medial lower lobes.            Workstation performed: [id number]         CT head without contrast   Final Result      No acute intracranial abnormality.                  Workstation performed: [id number]         CT chest with contrast   Final Result      Bilateral lower lobe airspace consolidations as well as tree-in-bud and groundglass opacities most conspicuous at the right upper lobe compatible with bronchopneumonia      Numerous bilateral anterior rib fractures.               Workstation performed: [id number]         XR chest 1 view portable   ED Interpretation   Reexpansion of R lung, adequate positioning of R chest tube.       Final Result      Since Ramona 3, 2000 2420:42:   1.  New right chest tube with decrease size of the right pneumothorax. Assessment of the pneumothorax is limited due to and overlying pacer pad, though on the subsequent CT chest, the pneumothorax is trace.   2.  Right lower lobe atelectasis.   3.  Persistent slight high positioning of enteric tube with sidehole at the expected position of the diaphragmatic hiatus. Advancement by approximately 4 cm is advised.         I personally discussed this study with Dr. Carias on 6/4/2024 10:56 AM.            Workstation performed: [id number]         XR chest 1 view portable   ED Interpretation   Large R PTX  ETT at demetrius  OGT subdiaphragmatic.       Final Result      Large right pneumothorax.      Endotracheal tube at the level of the demetrius, recommend retraction.      Enteric tube with tip at the level of the gastroesophageal junction, sideport in the lower esophagus.   Recommend advancement.      Multiple right anterior minimally displaced rib fractures.      Findings concur with the preliminary report by the referring clinician already in PACS and/or our electronic record EPIC.                Workstation performed: [id number]         CT chest wo contrast    (Results Pending)         Medications:  Scheduled PRN   acetaminophen, 975 mg, Q8H RONALD  chlorhexidine, 15 mL, Q12H RONALD  cyanocobalamin, 500 mcg, Daily  divalproex sodium, 500 mg, Q8H RONALD  enoxaparin, 40 mg, Q24H RONALD  folic acid, 1 mg, Daily  gabapentin, 400 mg, TID  insulin lispro, 1-6 Units, TID AC  insulin lispro, 1-6 Units, HS  lacosamide, 150 mg, QAM  lacosamide, 200 mg, HS  levETIRAcetam, 1,500 mg, Q12H RONALD  levothyroxine, 150 mcg, Daily  lidocaine, 2 patch, Daily  loratadine, 10 mg, Daily  magnesium Oxide, 400 mg, BID  Multivitamin, 15 mL, Daily  polyethylene glycol, 17 g, Daily  pravastatin, 80 mg, Daily With Dinner  QUEtiapine, 150 mg, BID  QUEtiapine, 400 mg, HS  senna-docusate sodium, 2 tablet, BID  zonisamide, 300 mg, HS      ondansetron, 4 mg, Q6H PRN  oxyCODONE, 2.5 mg, Q6H PRN   Or  oxyCODONE, 5 mg, Q6H PRN       Continuous          Labs:    CBC    Recent Labs     06/16/24  0452 06/17/24  0552   WBC 12.54* 11.42*   HGB 7.7* 8.6*   HCT 23.6* 27.4*   * 517*     BMP    Recent Labs     06/16/24  0452 06/17/24  0552   SODIUM 137 138   K 4.3 4.5    103   CO2 27 29   AGAP 6 6   BUN 19 23   CREATININE 0.53* 0.61   CALCIUM 8.5 8.5       Coags    No recent results     Additional Electrolytes  Recent Labs     06/16/24  0452 06/17/24  0552   MG 1.7* 1.7*   PHOS 3.6 3.9          Blood Gas    No recent results  No recent results LFTs  No recent results    Infectious  No recent results  Glucose  Recent Labs     06/16/24  0452 06/17/24  0552   GLUC 168* 218*               HUNTER Colby

## 2024-06-18 NOTE — PLAN OF CARE
Problem: PAIN - ADULT  Goal: Verbalizes/displays adequate comfort level or baseline comfort level  Description: Interventions:  - Encourage patient to monitor pain and request assistance  - Assess pain using appropriate pain scale  - Administer analgesics based on type and severity of pain and evaluate response  - Implement non-pharmacological measures as appropriate and evaluate response  - Consider cultural and social influences on pain and pain management  - Notify physician/advanced practitioner if interventions unsuccessful or patient reports new pain  Outcome: Progressing     Problem: INFECTION - ADULT  Goal: Absence or prevention of progression during hospitalization  Description: INTERVENTIONS:  - Assess and monitor for signs and symptoms of infection  - Monitor lab/diagnostic results  - Monitor all insertion sites, i.e. indwelling lines, tubes, and drains  - Monitor endotracheal if appropriate and nasal secretions for changes in amount and color  - [place] appropriate cooling/warming therapies per order  - Administer medications as ordered  - Instruct and encourage patient and family to use good hand hygiene technique  - Identify and instruct in appropriate isolation precautions for identified infection/condition  Outcome: Progressing     Problem: SAFETY ADULT  Goal: Patient will remain free of falls  Description: INTERVENTIONS:  - Educate patient/family on patient safety including physical limitations  - Instruct patient to call for assistance with activity   - Consult OT/PT to assist with strengthening/mobility   - Keep Call bell within reach  - Keep bed low and locked with side rails adjusted as appropriate  - Keep care items and personal belongings within reach  - Initiate and maintain comfort rounds  - Make Fall Risk Sign visible to staff  - Offer Toileting every 2 Hours, in advance of need  - Initiate/Maintain bed alarm  - Apply yellow socks and bracelet for high fall risk patients  - Consider  moving patient to room near nurses station  Outcome: Progressing  Goal: Maintain or return to baseline ADL function  Description: INTERVENTIONS:  -  Assess patient's ability to carry out ADLs; assess patient's baseline for ADL function and identify physical deficits which impact ability to perform ADLs (bathing, care of mouth/teeth, toileting, grooming, dressing, etc.)  - Assess/evaluate cause of self-care deficits   - Assess range of motion  - Assess patient's mobility; develop plan if impaired  - Assess patient's need for assistive devices and provide as appropriate  - Encourage maximum independence but intervene and supervise when necessary  - Involve family in performance of ADLs  - Assess for home care needs following discharge   - Consider OT consult to assist with ADL evaluation and planning for discharge  - Provide patient education as appropriate  Outcome: Progressing  Goal: Maintains/Returns to pre admission functional level  Description: INTERVENTIONS:  - Perform AM-PAC 6 Click Basic Mobility/ Daily Activity assessment daily.  - Set and communicate daily mobility goal to care team and patient/family/caregiver.   - Collaborate with rehabilitation services on mobility goals if consulted  - Reposition patient every 2 hours.  - Dangle patient 1 times a day  - Stand patient 1 times a day  - Out of bed to chair 1 times a day   - Out of bed for meals 1 times a day  - Out of bed for toileting  - Record patient progress and toleration of activity level   Outcome: Progressing     Problem: DISCHARGE PLANNING  Goal: Discharge to home or other facility with appropriate resources  Description: INTERVENTIONS:  - Identify barriers to discharge w/patient and caregiver  - Arrange for needed discharge resources and transportation as appropriate  - Identify discharge learning needs (meds, wound care, etc.)  - Arrange for interpretive services to assist at discharge as needed  - Refer to Case Management Department for  coordinating discharge planning if the patient needs post-hospital services based on physician/advanced practitioner order or complex needs related to functional status, cognitive ability, or social support system  Outcome: Progressing     Problem: Knowledge Deficit  Goal: Patient/family/caregiver demonstrates understanding of disease process, treatment plan, medications, and discharge instructions  Description: Complete learning assessment and assess knowledge base.  Interventions:  - Provide teaching at level of understanding  - Provide teaching via preferred learning methods  Outcome: Progressing     Problem: Nutrition/Hydration-ADULT  Goal: Nutrient/Hydration intake appropriate for improving, restoring or maintaining nutritional needs  Description: Monitor and assess patient's nutrition/hydration status for malnutrition. Collaborate with interdisciplinary team and initiate plan and interventions as ordered.  Monitor patient's weight and dietary intake as ordered or per policy. Utilize nutrition screening tool and intervene as necessary. Determine patient's food preferences and provide high-protein, high-caloric foods as appropriate.     INTERVENTIONS:  - Monitor oral intake, urinary output, labs, and treatment plans  - Assess nutrition and hydration status and recommend course of action  - Evaluate amount of meals eaten  - Assist patient with eating if necessary   - Allow adequate time for meals  - Recommend/ encourage appropriate diets, oral nutritional supplements, and vitamin/mineral supplements  - Order, calculate, and assess calorie counts as needed  - Recommend, monitor, and adjust tube feedings and TPN/PPN based on assessed needs  - Assess need for intravenous fluids  - Provide specific nutrition/hydration education as appropriate  - Include patient/family/caregiver in decisions related to nutrition  Outcome: Progressing     Problem: Prexisting or High Potential for Compromised Skin Integrity  Goal: Skin  integrity is maintained or improved  Description: INTERVENTIONS:  - Identify patients at risk for skin breakdown  - Assess and monitor skin integrity  - Assess and monitor nutrition and hydration status  - Monitor labs   - Assess for incontinence   - Turn and reposition patient  - Assist with mobility/ambulation  - Relieve pressure over bony prominences  - Avoid friction and shearing  - Provide appropriate hygiene as needed including keeping skin clean and dry  - Evaluate need for skin moisturizer/barrier cream  - Collaborate with interdisciplinary team   - Patient/family teaching  - Consider wound care consult   Outcome: Progressing

## 2024-06-18 NOTE — CONSULTS
Consultation - Pulmonary Medicine   Jairon Oconnell 59 y.o. male MRN: [id number]  Unit/Bed#: ICU 10 Encounter: [id number]      Physician Requesting Consult: Bonny Olivarez  Reason for Consult: pneumothorax    Assessment/Plan:  Jairon Oconnell is a 59 y.o. male hx HTN, hypothyroidism, DM2, epilepsy, who presents with cardiac arrest in setting of choking, sp ROSC and extubation. Pulmonary consulted for pneumothorax    Acute hypoxic respiratory failure  Right rib fracture and pneumothorax  Right pleural effusion (hemothorax)  Has multiple pleural complications after CRP and rib fracture including pneumothorax, hemothorax sp chest tubes. Currently with one right 14 Fr chest tube sp TPA x3 with improvement of loculated fluid. Also with small right apical pneumothorax, possibly hydropneumothorax from some trapped lung. Overall small and pt breathing comfortably, no need for evacuation     Right lower pigtail with 1400 output in 24 hrs, trace residual fluid on imaging.     Repeat CXR in am  change right lower pigtail to waterseal  Monitor I/Os for tube, can pull once < 100cc in 24 hrs    Discussed w primary team   ______________________________________________________________________    HPI:    Jairon Oconnell is a 59 y.o. male hx HTN, hypothyroidism, DM2, epilepsy, who presents with cardiac arrest in setting of choking, sp ROSC. Course complicated by rib fracture, pneumothorax sp chest tube that was removed. Laterhe developed a large right hemothorax and a 30 Fr chest tube was placed to drain that which was complicated by very inferior positioning. After discussion with thoracics, it was felt to still be above the diaphragm, then another 14Fr ches ttube to help with loculated effusion sp 3 days of TPA/dornase with good response.  Currently extubated on NC but with small residual R pneumothorax        Historical Information   Past Medical History:   Diagnosis Date    ADRIANA (acute kidney injury) (HCC) 9/24/2019     Bacteremia due to Gram-positive bacteria 9/7/2021    Buttock wound, left, subsequent encounter 11/5/2021    COVID-19     CP (cerebral palsy) ([facility])     Depression     Diabetes ([facility])     Disease of thyroid gland     Gait disorder     Gluteal abscess 9/6/2021    Hyperlipidemia     Hypertension     Kidney failure     Kidney stones     Moderate protein-calorie malnutrition ([facility]) 12/22/2021    Osteoporosis     Pressure injury of left buttock, stage 4 ([facility]) 3/9/2022    Psychiatric disorder     Scoliosis     Seizures ([facility])     Sepsis ([facility]) 9/25/2019    Thyroid disease     UTI (urinary tract infection)      Past Surgical History:   Procedure Laterality Date    APPENDECTOMY      IR CHEST TUBE PLACEMENT  6/14/2024    IR PICC PLACEMENT SINGLE LUMEN  9/13/2021    IR PICC PLACEMENT SINGLE LUMEN  9/16/2021     Social History   Social History     Substance and Sexual Activity   Alcohol Use Never     Social History     Tobacco Use   Smoking Status Never   Smokeless Tobacco Never       Family History:   No family history on file.    Medications:  The patient's active and prehospital medications were reviewed.  Current Facility-Administered Medications   Medication Dose Route Frequency Provider Last Rate    acetaminophen  975 mg Oral Q8H [facility] HUNTER Nagel      chlorhexidine  15 mL Mouth/Throat Q12H [facility] HUNTER Nagel      cyanocobalamin  500 mcg Oral Daily Hasmukh Tesoriero, DO      divalproex sodium  500 mg Oral Q8H [facility] HUNTER Nagel      enoxaparin  40 mg Subcutaneous Q24H [facility] HUNTER Nagel      folic acid  1 mg Oral Daily Hasmukh Tesoriero, DO      gabapentin  400 mg Oral TID HUNTER Nagel      insulin glargine  5 Units Subcutaneous HS HUNTER Nagel      insulin lispro  1-6 Units Subcutaneous TID AC HUNTER Nagel      insulin lispro  1-6 Units Subcutaneous HS HUNTER Nagel      lacosamide  150 mg Per NG Tube QAM HUNTER Nagel      lacosamide  200 mg Per NG  Tube HS John Churchill, HUNTER      levETIRAcetam  1,500 mg Oral Q12H [facility] John Churchill, HUNTER      levothyroxine  150 mcg Oral Daily John Churchill, HUNTER      lidocaine  2 patch Topical Daily John Churchill, HUNTER      loratadine  10 mg Oral Daily John Churchill, HUNTER      magnesium Oxide  400 mg Oral BID Rick Reyes PA-C      Multivitamin  15 mL Oral Daily Hasmukh Tesoriero,       ondansetron  4 mg Intravenous Q6H PRN Cece Taveras, HUNTER      oxyCODONE  2.5 mg Oral Q6H PRN John Churchill, HUNTER      Or    oxyCODONE  5 mg Oral Q6H PRN John Churchill, HUNTER      polyethylene glycol  17 g Oral Daily Lila Rios, HUNTER      pravastatin  80 mg Oral Daily With Dinner John Churchill, HUNTER      QUEtiapine  150 mg Oral BID Cyndy May, HUNTER      QUEtiapine  400 mg Oral HS Cyndy May, HUNTER      senna-docusate sodium  2 tablet Oral BID John Churchill, HUNTER      zonisamide  300 mg Oral HS HUNTER Nagel           Physical Examination:  Vitals:   Vitals:    06/17/24 1900 06/17/24 1910 06/17/24 2330 06/18/24 0700   BP: 118/56  90/54 118/63   BP Location: Left arm  Left arm Left arm   Pulse: (!) 110  (!) 110 98   Resp: 18 18 18 15   Temp: 97.7 °F (36.5 °C)  99.1 °F (37.3 °C) 98.6 °F (37 °C)   TempSrc: Axillary  Axillary Axillary   SpO2: 92%  91% 95%   Weight:       Height:         Body mass index is 23.98 kg/m².  Temp  Min: 91.9 °F (33.3 °C)  Max: 100.8 °F (38.2 °C)  IBW (Ideal Body Weight): 77.6 kg    SpO2: 95 %,   SpO2 Activity: At Rest,   O2 Device: None (Room air)    Exam:   Appearance -- chronically ill appearing  Heart -- tachycardic  Lungs -- good breath sounds b/l  Abdomen -- soft, NTND,  Extremities -- WWP, no c/c/e  Skin -- no rash  Neuro -- somnolent in the afternoon (per team very agitated in AM)  Psych -- no obvious depression or hallucination      Diagnostic Data:  CBC:   Results from last 7 days   Lab Units 06/17/24  0552 06/16/24  0452 06/15/24  0553   WBC Thousand/uL 11.42*  "12.54* 15.42*   HEMOGLOBIN g/dL 8.6* 7.7* 8.8*   HEMATOCRIT % 27.4* 23.6* 27.5*   PLATELETS Thousands/uL 517* 437* 447*       CMP:   Results from last 7 days   Lab Units 06/17/24  0552 06/16/24  0452 06/15/24  0553 06/14/24  0602   SODIUM mmol/L 138 137 139 143   POTASSIUM mmol/L 4.5 4.3 4.6 4.1   CHLORIDE mmol/L 103 104 104 106   CO2 mmol/L 29 27 28 31   BUN mg/dL 23 19 18 16   CREATININE mg/dL 0.61 0.53* 0.57* 0.47*   CALCIUM mg/dL 8.5 8.5 9.1 8.9   ALK PHOS U/L  --   --   --  89   ALT U/L  --   --   --  59*   AST U/L  --   --   --  36         Microbiology:      Results from last 7 days   Lab Units 06/14/24  0602   PROCALCITONIN ng/ml 0.13             ABG: No results found for: \"PHART\", \"TBF1PHF\", \"PO2ART\", \"DLI9KZY\", \"P1DWPTSB\", \"BEART\", \"SOURCE\"    Imaging:   The pertinent imaging studies were reviewed personally on the PACS system  CT Chest 6.18/24:  Smallbore chest catheter terminates posterior to right lower lobe. There is small right apical pneumothorax, increased since the prior study. Scattered atelectasis bilaterally, right greater than left. No tracheal or endobronchial lesion.     PLEURA: Small right pneumothorax, as above. Decreased small right pleural effusion with trace residual. Trace left pleural effusion.    Cardiac lab/EKG/telemetry/ECHO:           Invalid input(s): \"RAMAY9V\", \"EPUMQ3K\"      Lab Results   Component Value Date    BNP 9 06/03/2024         Lachelle Silva MD         "

## 2024-06-18 NOTE — ASSESSMENT & PLAN NOTE
Lab Results   Component Value Date    HGBA1C 6.7 (H) 06/14/2024       Recent Labs     06/17/24  1111 06/17/24  1634 06/17/24  2101 06/18/24  0717   POCGLU 254* 275* 313* 223*       Blood Sugar Average: Last 72 hrs:  (P) 266.25

## 2024-06-18 NOTE — ASSESSMENT & PLAN NOTE
Patient with elevated blood pressures.  Will resume lower dose of lisinopril at 5 mg.  PTA dosing is 10 mg

## 2024-06-18 NOTE — ASSESSMENT & PLAN NOTE
S/p 10 Fr right chest tube, discontinued on 6/8  32 F chest tube placed 6/9 due to hemothorax and persistent small pneumothorax  IR consulted for pigtail placement, placed 6/14  Trial of TPA/dornase   Chest tubes to suction overnight  Monitor output from chest tube  Repeat CT to evaluate effusion today

## 2024-06-18 NOTE — PLAN OF CARE
Problem: Knowledge Deficit  Goal: Patient/family/caregiver demonstrates understanding of disease process, treatment plan, medications, and discharge instructions  Description: Complete learning assessment and assess knowledge base.  Interventions:  - Provide teaching at level of understanding  - Provide teaching via preferred learning methods  Outcome: Progressing     Problem: Nutrition/Hydration-ADULT  Goal: Nutrient/Hydration intake appropriate for improving, restoring or maintaining nutritional needs  Description: Monitor and assess patient's nutrition/hydration status for malnutrition. Collaborate with interdisciplinary team and initiate plan and interventions as ordered.  Monitor patient's weight and dietary intake as ordered or per policy. Utilize nutrition screening tool and intervene as necessary. Determine patient's food preferences and provide high-protein, high-caloric foods as appropriate.     INTERVENTIONS:  - Monitor oral intake, urinary output, labs, and treatment plans  - Assess nutrition and hydration status and recommend course of action  - Evaluate amount of meals eaten  - Assist patient with eating if necessary   - Allow adequate time for meals  - Recommend/ encourage appropriate diets, oral nutritional supplements, and vitamin/mineral supplements  - Order, calculate, and assess calorie counts as needed  - Recommend, monitor, and adjust tube feedings and TPN/PPN based on assessed needs  - Assess need for intravenous fluids  - Provide specific nutrition/hydration education as appropriate  - Include patient/family/caregiver in decisions related to nutrition  Outcome: Progressing     Problem: Prexisting or High Potential for Compromised Skin Integrity  Goal: Skin integrity is maintained or improved  Description: INTERVENTIONS:  - Identify patients at risk for skin breakdown  - Assess and monitor skin integrity  - Assess and monitor nutrition and hydration status  - Monitor labs   - Assess for  incontinence   - Turn and reposition patient  - Assist with mobility/ambulation  - Relieve pressure over bony prominences  - Avoid friction and shearing  - Provide appropriate hygiene as needed including keeping skin clean and dry  - Evaluate need for skin moisturizer/barrier cream  - Collaborate with interdisciplinary team   - Patient/family teaching  - Consider wound care consult   Outcome: Progressing

## 2024-06-18 NOTE — QUICK NOTE
TPA/dornase instilled in the right lateral chest tube in the standard fashion.  CT clamped.  Will plan to release clamp in 1 hour.

## 2024-06-18 NOTE — ASSESSMENT & PLAN NOTE
Patient initially with 10 Fr chest tube, discontinued after initial resolution of pneumothorax  Complicated by hemothorax, 30 Fr chest tube placed which was retracted  Discontinued  14 Fr chest tube placed by IR, completed 3 days of TPA/dornase with good effect  Persistent right apical pneumothorax  Pulmonary consulted for assistance-this morning they placed the chest tube on waterseal and will continue to monitor for removal of the chest tube

## 2024-06-18 NOTE — ASSESSMENT & PLAN NOTE
Lab Results   Component Value Date    HGBA1C 6.7 (H) 06/14/2024       Recent Labs     06/17/24  1111 06/17/24  1634 06/17/24  2101   POCGLU 254* 275* 313*         Blood Sugar Average: Last 72 hrs:  (P) 280.2466639368927561      Holding metformin while inpatient   Before meals and at bedtime glucoses with coverage as needed

## 2024-06-19 ENCOUNTER — APPOINTMENT (INPATIENT)
Dept: RADIOLOGY | Facility: HOSPITAL | Age: 59
DRG: 208 | End: 2024-06-19
Payer: MEDICARE

## 2024-06-19 LAB
GLUCOSE SERPL-MCNC: 167 MG/DL (ref 65–140)
GLUCOSE SERPL-MCNC: 180 MG/DL (ref 65–140)
GLUCOSE SERPL-MCNC: 197 MG/DL (ref 65–140)
GLUCOSE SERPL-MCNC: 220 MG/DL (ref 65–140)

## 2024-06-19 PROCEDURE — 82948 REAGENT STRIP/BLOOD GLUCOSE: CPT

## 2024-06-19 PROCEDURE — 92526 ORAL FUNCTION THERAPY: CPT

## 2024-06-19 PROCEDURE — 71045 X-RAY EXAM CHEST 1 VIEW: CPT

## 2024-06-19 PROCEDURE — 99232 SBSQ HOSP IP/OBS MODERATE 35: CPT | Performed by: INTERNAL MEDICINE

## 2024-06-19 RX ORDER — LISINOPRIL 5 MG/1
5 TABLET ORAL DAILY
Status: DISCONTINUED | OUTPATIENT
Start: 2024-06-19 | End: 2024-06-21 | Stop reason: HOSPADM

## 2024-06-19 RX ADMIN — ZONISAMIDE 300 MG: 100 CAPSULE ORAL at 22:01

## 2024-06-19 RX ADMIN — POLYETHYLENE GLYCOL 3350 17 G: 17 POWDER, FOR SOLUTION ORAL at 08:44

## 2024-06-19 RX ADMIN — FOLIC ACID 1 MG: 1 TABLET ORAL at 08:32

## 2024-06-19 RX ADMIN — Medication 400 MG: at 08:32

## 2024-06-19 RX ADMIN — BENZTROPINE MESYLATE 0.5 MG: 1 TABLET ORAL at 08:34

## 2024-06-19 RX ADMIN — SENNOSIDES AND DOCUSATE SODIUM 2 TABLET: 50; 8.6 TABLET ORAL at 08:32

## 2024-06-19 RX ADMIN — GABAPENTIN 400 MG: 400 CAPSULE ORAL at 22:01

## 2024-06-19 RX ADMIN — LACOSAMIDE 150 MG: 100 TABLET, FILM COATED ORAL at 08:33

## 2024-06-19 RX ADMIN — ACETAMINOPHEN 975 MG: 325 TABLET, FILM COATED ORAL at 22:01

## 2024-06-19 RX ADMIN — LORATADINE 10 MG: 10 TABLET ORAL at 08:32

## 2024-06-19 RX ADMIN — INSULIN LISPRO 2 UNITS: 100 INJECTION, SOLUTION INTRAVENOUS; SUBCUTANEOUS at 08:49

## 2024-06-19 RX ADMIN — LEVOTHYROXINE SODIUM 150 MCG: 0.15 TABLET ORAL at 08:33

## 2024-06-19 RX ADMIN — GABAPENTIN 400 MG: 400 CAPSULE ORAL at 08:34

## 2024-06-19 RX ADMIN — GABAPENTIN 400 MG: 400 CAPSULE ORAL at 15:10

## 2024-06-19 RX ADMIN — ACETAMINOPHEN 975 MG: 325 TABLET, FILM COATED ORAL at 06:08

## 2024-06-19 RX ADMIN — INSULIN LISPRO 3 UNITS: 100 INJECTION, SOLUTION INTRAVENOUS; SUBCUTANEOUS at 11:50

## 2024-06-19 RX ADMIN — LISINOPRIL 5 MG: 5 TABLET ORAL at 13:47

## 2024-06-19 RX ADMIN — OXYCODONE HYDROCHLORIDE 5 MG: 5 TABLET ORAL at 15:10

## 2024-06-19 RX ADMIN — DIVALPROEX SODIUM 500 MG: 125 CAPSULE ORAL at 13:48

## 2024-06-19 RX ADMIN — INSULIN LISPRO 1 UNITS: 100 INJECTION, SOLUTION INTRAVENOUS; SUBCUTANEOUS at 17:32

## 2024-06-19 RX ADMIN — QUETIAPINE FUMARATE 150 MG: 100 TABLET ORAL at 17:34

## 2024-06-19 RX ADMIN — INSULIN LISPRO 3 UNITS: 100 INJECTION, SOLUTION INTRAVENOUS; SUBCUTANEOUS at 17:32

## 2024-06-19 RX ADMIN — LACOSAMIDE 200 MG: 100 TABLET, FILM COATED ORAL at 21:59

## 2024-06-19 RX ADMIN — QUETIAPINE FUMARATE 400 MG: 100 TABLET ORAL at 22:00

## 2024-06-19 RX ADMIN — ACETAMINOPHEN 975 MG: 325 TABLET, FILM COATED ORAL at 13:47

## 2024-06-19 RX ADMIN — INSULIN LISPRO 3 UNITS: 100 INJECTION, SOLUTION INTRAVENOUS; SUBCUTANEOUS at 08:50

## 2024-06-19 RX ADMIN — INSULIN LISPRO 1 UNITS: 100 INJECTION, SOLUTION INTRAVENOUS; SUBCUTANEOUS at 11:50

## 2024-06-19 RX ADMIN — PRAVASTATIN SODIUM 80 MG: 80 TABLET ORAL at 17:35

## 2024-06-19 RX ADMIN — ENOXAPARIN SODIUM 40 MG: 40 INJECTION SUBCUTANEOUS at 08:41

## 2024-06-19 RX ADMIN — SENNOSIDES AND DOCUSATE SODIUM 2 TABLET: 50; 8.6 TABLET ORAL at 17:35

## 2024-06-19 RX ADMIN — DIVALPROEX SODIUM 500 MG: 125 CAPSULE ORAL at 06:08

## 2024-06-19 RX ADMIN — DIVALPROEX SODIUM 500 MG: 125 CAPSULE ORAL at 22:03

## 2024-06-19 RX ADMIN — LEVETIRACETAM 1500 MG: 500 TABLET, FILM COATED ORAL at 08:33

## 2024-06-19 RX ADMIN — CYANOCOBALAMIN TAB 500 MCG 500 MCG: 500 TAB at 08:34

## 2024-06-19 RX ADMIN — INSULIN LISPRO 2 UNITS: 100 INJECTION, SOLUTION INTRAVENOUS; SUBCUTANEOUS at 22:03

## 2024-06-19 RX ADMIN — Medication 400 MG: at 17:35

## 2024-06-19 RX ADMIN — QUETIAPINE FUMARATE 150 MG: 100 TABLET ORAL at 08:33

## 2024-06-19 RX ADMIN — Medication 15 ML: at 08:49

## 2024-06-19 RX ADMIN — LIDOCAINE 5% 2 PATCH: 700 PATCH TOPICAL at 08:47

## 2024-06-19 RX ADMIN — OXYCODONE HYDROCHLORIDE 5 MG: 5 TABLET ORAL at 08:33

## 2024-06-19 RX ADMIN — INSULIN GLARGINE 5 UNITS: 100 INJECTION, SOLUTION SUBCUTANEOUS at 21:59

## 2024-06-19 RX ADMIN — LEVETIRACETAM 1500 MG: 500 TABLET, FILM COATED ORAL at 22:00

## 2024-06-19 NOTE — PLAN OF CARE
Plan/Recommendations:  Puree/Pudding thick  1:1 full feed  Slow rate of intake  Small bites/sips  90' upright   Meds crushed padilla royal   Will continue to follow x1-3

## 2024-06-19 NOTE — NURSING NOTE
During routine inspection/dressing change of chest tube site, dislodgment noted with serous drainage,  no obvious injury. Vaseline gauze, DCD applied & secured with pressure tape. Tube remains sutured to pt right posterior ribs, as per slim via secure chat will evaluate removal of sutures in the AM based on Pulmonary attending's input. Pt reports no changes in pain. No SOB, no s/s resp distress noted. No changes in LOC.  Charge nurse present at bedside. SLIM came to evaluate pt. pulmonary AP notified with photo via secure chat. Pt A&Ox1-2, requires frequent reorientation.  Repositions self, restless at times.

## 2024-06-19 NOTE — PROGRESS NOTES
[facility]  Progress Note  Name: Jairon Oconnell I  MRN: [id number]  Unit/Bed#: -01 I Date of Admission: 6/3/2024   Date of Service: 6/19/2024 I Hospital Day: 16    Assessment & Plan   Closed traumatic fracture of ribs of right side with pneumothorax  Assessment & Plan  Patient initially with 10 Fr chest tube, discontinued after initial resolution of pneumothorax  Complicated by hemothorax, 30 Fr chest tube placed which was retracted  Discontinued  14 Fr chest tube placed by IR, completed 3 days of TPA/dornase with good effect  Persistent right apical pneumothorax  Pulmonary consulted for assistance-this morning they placed the chest tube on waterseal and will continue to monitor for removal of the chest tube    * Cardiac arrest (HCC)  Assessment & Plan  Likely hypoxic  ECHO completed without acute findings  Continue supportive care    DM (diabetes mellitus) (HCC)  Assessment & Plan  Lab Results   Component Value Date    HGBA1C 6.7 (H) 06/14/2024       Recent Labs     06/17/24  1111 06/17/24  1634 06/17/24  2101 06/18/24  0717   POCGLU 254* 275* 313* 223*       Blood Sugar Average: Last 72 hrs:  (P) 266.25      Dysphagia  Assessment & Plan  Modified diet  Appreciate speech support, continue modified diet    Ambulatory dysfunction  Assessment & Plan  Appreciate PT OT support  Per case management rounds this morning, the patient would do better in the group home setting and thus the goals are this on discharge    Behavior concern in adult  Assessment & Plan  Continue home Seroquel    Acquired hypothyroidism  Assessment & Plan  Continue levothyroxine    HTN (hypertension)  Assessment & Plan  Patient with elevated blood pressures.  Will resume lower dose of lisinopril at 5 mg.  PTA dosing is 10 mg    HLD (hyperlipidemia)  Assessment & Plan  Continue home statin equivalent    Generalized convulsive epilepsy (HCC)  Assessment & Plan  Continue PTA regimen  Seizure precautions               VTE  Pharmacologic Prophylaxis: VTE Score: 6 Moderate Risk (Score 3-4) - Pharmacological DVT Prophylaxis Ordered: enoxaparin (Lovenox).    Mobility:   Basic Mobility Inpatient Raw Score: 14  -HLM Goal: 4: Move to chair/commode  JH-HLM Achieved: 2: Bed activities/Dependent transfer  JH-HLM Goal NOT achieved. Continue with multidisciplinary rounding and encourage appropriate mobility to improve upon JH-HLM goals.    Patient Centered Rounds: I performed bedside rounds with nursing staff today.   Discussions with Specialists or Other Care Team Provider: CM, pulmonary note reviewed    Education and Discussions with Family / Patient:  Will discuss with patient care representatives.     Total Time Spent on Date of Encounter in care of patient: 35 mins. This time was spent on one or more of the following: performing physical exam; counseling and coordination of care; obtaining or reviewing history; documenting in the medical record; reviewing/ordering tests, medications or procedures; communicating with other healthcare professionals and discussing with patient's family/caregivers.    Current Length of Stay: 16 day(s)  Current Patient Status: Inpatient   Certification Statement: The patient will continue to require additional inpatient hospital stay due to pneumothorax, chest tube, previous cardiac arrest  Discharge Plan: Anticipate discharge in >72 hrs to longterm.    Code Status: Level 1 - Full Code    Subjective:   He is asleep, he is without complaints on waking up. He denies cp/sob.  He denies nausea/vomiting    Objective:     Vitals:   Temp (24hrs), Av.5 °F (36.9 °C), Min:98.4 °F (36.9 °C), Max:98.6 °F (37 °C)    Temp:  [98.4 °F (36.9 °C)-98.6 °F (37 °C)] 98.4 °F (36.9 °C)  HR:  [] 98  Resp:  [15-16] 15  BP: (109-158)/(69-87) 158/87  SpO2:  [94 %-99 %] 94 %  Body mass index is 23.98 kg/m².     Input and Output Summary (last 24 hours):     Intake/Output Summary (Last 24 hours) at 2024 1158  Last data  filed at 6/18/2024 1800  Gross per 24 hour   Intake 230 ml   Output 780 ml   Net -550 ml       Physical Exam:   Physical Exam  Vitals and nursing note reviewed.   Constitutional:       Appearance: Normal appearance. He is ill-appearing.   HENT:      Head: Normocephalic and atraumatic.      Nose: Nose normal. No congestion.      Mouth/Throat:      Mouth: Mucous membranes are moist.      Pharynx: No oropharyngeal exudate.   Eyes:      General: No scleral icterus.     Conjunctiva/sclera: Conjunctivae normal.   Pulmonary:      Effort: Pulmonary effort is normal. No respiratory distress.      Comments: Chest tube in place, to water seal  Abdominal:      General: Bowel sounds are normal. There is no distension.      Palpations: Abdomen is soft.      Tenderness: There is no abdominal tenderness.   Musculoskeletal:      Cervical back: Normal range of motion and neck supple. No rigidity.   Skin:     General: Skin is warm.      Coloration: Skin is not jaundiced.   Neurological:      Mental Status: He is alert.          Additional Data:     Labs:  Results from last 7 days   Lab Units 06/17/24  0552 06/15/24  0553 06/14/24  0602   WBC Thousand/uL 11.42*   < > 12.19*   HEMOGLOBIN g/dL 8.6*   < > 7.5*   HEMATOCRIT % 27.4*   < > 22.8*   PLATELETS Thousands/uL 517*   < > 353   SEGS PCT %  --   --  67   LYMPHO PCT %  --   --  19   MONO PCT %  --   --  7   EOS PCT %  --   --  4    < > = values in this interval not displayed.     Results from last 7 days   Lab Units 06/17/24  0552 06/15/24  0553 06/14/24  0602   SODIUM mmol/L 138   < > 143   POTASSIUM mmol/L 4.5   < > 4.1   CHLORIDE mmol/L 103   < > 106   CO2 mmol/L 29   < > 31   BUN mg/dL 23   < > 16   CREATININE mg/dL 0.61   < > 0.47*   ANION GAP mmol/L 6   < > 6   CALCIUM mg/dL 8.5   < > 8.9   ALBUMIN g/dL  --   --  3.1*   TOTAL BILIRUBIN mg/dL  --   --  0.30   ALK PHOS U/L  --   --  89   ALT U/L  --   --  59*   AST U/L  --   --  36   GLUCOSE RANDOM mg/dL 218*   < > 164*    < > =  values in this interval not displayed.         Results from last 7 days   Lab Units 06/19/24  1149 06/19/24  0710 06/18/24  2139 06/18/24  2113 06/18/24  1622 06/18/24  1141 06/18/24  0717 06/17/24  2101 06/17/24  1634 06/17/24  1111 06/14/24  1745 06/14/24  1224   POC GLUCOSE mg/dl 167* 220* 202* 146* 269* 304* 223* 313* 275* 254* 146* 131     Results from last 7 days   Lab Units 06/14/24  0602   HEMOGLOBIN A1C % 6.7*     Results from last 7 days   Lab Units 06/14/24  0602   PROCALCITONIN ng/ml 0.13       Lines/Drains:  Invasive Devices       Peripheral Intravenous Line  Duration             Long-Dwell Peripheral IV (Midline) 06/17/24 Right Basilic 2 days              Drain  Duration             Chest Tube 2 Right Posterior 14 Fr. 5 days    External Urinary Catheter <1 day                          Imaging: Personally reviewed the following imaging: chest xray    Recent Cultures (last 7 days):         Last 24 Hours Medication List:   Current Facility-Administered Medications   Medication Dose Route Frequency Provider Last Rate    acetaminophen  975 mg Oral Q8H [facility] HUNTER Nagel      benztropine  0.5 mg Oral Daily HUNTER Nagel      cyanocobalamin  500 mcg Oral Daily HUNTER Nagel      divalproex sodium  500 mg Oral Q8H [facility] HUNTER Nagel      enoxaparin  40 mg Subcutaneous Q24H [facility] HUNTER Nagel      folic acid  1 mg Oral Daily HUNTER Nagel      gabapentin  400 mg Oral TID HUNTER Nagel      insulin glargine  5 Units Subcutaneous HS HUNTER Nagel      insulin lispro  1-6 Units Subcutaneous TID AC HUNTER Nagel      insulin lispro  1-6 Units Subcutaneous HS HUNTER Nagel      insulin lispro  3 Units Subcutaneous TID With Meals HUNTER Nagel      lacosamide  150 mg Per NG Tube QAM HUNTER Nagel      lacosamide  200 mg Per NG Tube HS HUNTER Nagel      levETIRAcetam  1,500 mg Oral Q12H [facility] John  HUNTER Churchill      levothyroxine  150 mcg Oral Daily HUNTER Nagel      lidocaine  2 patch Topical Daily HUNTER Nagel      lisinopril  5 mg Oral Daily Amol Patel MD      loratadine  10 mg Oral Daily HUNTER Nagel      magnesium Oxide  400 mg Oral BID HUNTER Nagel      Multivitamin  15 mL Oral Daily HUNTER Nagel      ondansetron  4 mg Intravenous Q6H PRN HUNTER Nagel      oxyCODONE  2.5 mg Oral Q6H PRN HUNTER Nagel      Or    oxyCODONE  5 mg Oral Q6H PRN HUNTER Nagel      polyethylene glycol  17 g Oral Daily HUNTER Nagel      pravastatin  80 mg Oral Daily With Dinner HUNTER Nagel      QUEtiapine  150 mg Oral BID HUNTER Nagel      QUEtiapine  400 mg Oral HS HUNTER Nagel      senna-docusate sodium  2 tablet Oral BID HUNTER Nagel      zonisamide  300 mg Oral HS HUNTER Nagel          Today, Patient Was Seen By: Amol Patel MD    **Please Note: This note may have been constructed using a voice recognition system.**

## 2024-06-19 NOTE — SPEECH THERAPY NOTE
Speech Language/Pathology    Speech/Language Pathology Progress Note    Patient Name: Jairon Oconnell  Today's Date: 6/19/2024     Problem List  Principal Problem:    Cardiac arrest (HCC)  Active Problems:    Generalized convulsive epilepsy (HCC)    HLD (hyperlipidemia)    HTN (hypertension)    Acquired hypothyroidism    Behavior concern in adult    Ambulatory dysfunction    Dysphagia    Closed traumatic fracture of ribs of right side with pneumothorax    DM (diabetes mellitus) (Formerly [facility])       Past Medical History  Past Medical History:   Diagnosis Date    ADRIANA (acute kidney injury) (Formerly [facility]) 9/24/2019    Bacteremia due to Gram-positive bacteria 9/7/2021    Buttock wound, left, subsequent encounter 11/5/2021    COVID-19     CP (cerebral palsy) (Formerly [facility])     Depression     Diabetes ([facility])     Disease of thyroid gland     Gait disorder     Gluteal abscess 9/6/2021    Hyperlipidemia     Hypertension     Kidney failure     Kidney stones     Moderate protein-calorie malnutrition ([facility]) 12/22/2021    Osteoporosis     Pressure injury of left buttock, stage 4 ([facility]) 3/9/2022    Psychiatric disorder     Scoliosis     Seizures ([facility])     Sepsis ([facility]) 9/25/2019    Thyroid disease     UTI (urinary tract infection)         Past Surgical History  Past Surgical History:   Procedure Laterality Date    APPENDECTOMY      IR CHEST TUBE PLACEMENT  6/14/2024    IR PICC PLACEMENT SINGLE LUMEN  9/13/2021    IR PICC PLACEMENT SINGLE LUMEN  9/16/2021         Subjective:  Pt received awake and alert in bed, completing breakfast meal with RN. Pt was boosted by RN and SLP student to sustain upright posture for feeding. Pt had completed eating pudding cup and vanilla magic cup. RN reports intermittent coughing with feeding and more towards end of meal.    Objective:  The following consistencies were administered today: pudding thick liquids. Foods included chocolate pudding. Retrieval and containment of bolus were adequate. A-P transfer judged as quick as pt  will prompt for more food prior to initiation of swallow. Swallow initiation appears delayed. Noted coughing x2 with tsp amounts of pudding. No changes in vocal quality or respiratory status noted. Pt is on RA with O2 saturation of 94%.    Assessment:  Pt continues to present with oropharyngeal dysphagia however improvement noted given his respiratory status and reduced secretions both before and during po intake. Pt benefits from verbal cues of swallowing before next bite as well as swallowing saliva to clear his oral cavity. Pt also benefits from slow rate of intake which does appear to eliminate coughing.    Plan/Recommendations:  Puree/Pudding thick  1:1 full feed  Slow rate of intake  Small bites/sips  90' upright   Meds crushed padilla royal   Will continue to follow x1-3

## 2024-06-19 NOTE — PROGRESS NOTES
Progress Note - Pulmonary   Jaiorn Oconnell 59 y.o. male MRN: [id number]  Unit/Bed#: -01 Encounter: [id number]    Assessment:  Acute hypoxemic respiratory failure, improved  2.   Right rib fracture or pneumothorax  3.   Right pleural effusion (hemothorax)    Plan:  Patient with cardiac arrest in the setting of choking status post ROSC and extubation.  Complicated by multiple rib fractures, pneumothorax and hemothorax status post chest tubes  He currently remains on room air  1 chest tube remaining which was placed to waterseal yesterday, still with output of just over 200 over the last 24 hours  Repeat chest x-ray done this morning overall looks stable, do not see any worsening of pneumothorax on my review but will await final reading from radiology  Will maintain chest tube to waterseal and follow output and serial imaging  Will continue to follow    Subjective:   Patient resting in bed. He is comfortable, asking for soda.     Objective:     Vitals: Blood pressure 158/87, pulse 98, temperature 98.4 °F (36.9 °C), temperature source Oral, resp. rate 15, height 6' (1.829 m), weight 80.2 kg (176 lb 12.9 oz), SpO2 94%.,Body mass index is 23.98 kg/m².      Intake/Output Summary (Last 24 hours) at 6/19/2024 1026  Last data filed at 6/18/2024 1800  Gross per 24 hour   Intake 230 ml   Output 780 ml   Net -550 ml       Invasive Devices       Peripheral Intravenous Line  Duration             Long-Dwell Peripheral IV (Midline) 06/17/24 Right Basilic 1 day              Drain  Duration             Chest Tube 2 Right Posterior 14 Fr. 4 days    External Urinary Catheter <1 day                    Physical Exam: /87   Pulse 98   Temp 98.4 °F (36.9 °C) (Oral)   Resp 15   Ht 6' (1.829 m)   Wt 80.2 kg (176 lb 12.9 oz)   SpO2 94%   BMI 23.98 kg/m²   General appearance: alert and no distress  Head: Normocephalic, without obvious abnormality, atraumatic  Eyes: negative findings: conjunctivae and sclerae normal  Lungs:   "right sided chest tube in place, breath sounds clear  Heart: regular rate and rhythm  Abdomen: normal findings: soft, non-tender  Extremities:  no edema  Skin:  warm and dry  Neurologic: Mental status: Alert, oriented, thought content appropriate     Labs: I have personally reviewed pertinent lab results., CBC: No results found for: \"WBC\", \"HGB\", \"HCT\", \"MCV\", \"PLT\", \"ADJUSTEDWBC\", \"RBC\", \"MCH\", \"MCHC\", \"RDW\", \"MPV\", \"NRBC\", CMP: No results found for: \"SODIUM\", \"K\", \"CL\", \"CO2\", \"ANIONGAP\", \"BUN\", \"CREATININE\", \"GLUCOSE\", \"CALCIUM\", \"AST\", \"ALT\", \"ALKPHOS\", \"PROT\", \"BILITOT\", \"EGFR\"  Imaging and other studies: I have personally reviewed pertinent reports.   and I have personally reviewed pertinent films in PACS    "

## 2024-06-19 NOTE — PLAN OF CARE
Problem: PAIN - ADULT  Goal: Verbalizes/displays adequate comfort level or baseline comfort level  Description: Interventions:  - Encourage patient to monitor pain and request assistance  - Assess pain using appropriate pain scale  - Administer analgesics based on type and severity of pain and evaluate response  - Implement non-pharmacological measures as appropriate and evaluate response  - Consider cultural and social influences on pain and pain management  - Notify physician/advanced practitioner if interventions unsuccessful or patient reports new pain  Outcome: Progressing     Problem: INFECTION - ADULT  Goal: Absence or prevention of progression during hospitalization  Description: INTERVENTIONS:  - Assess and monitor for signs and symptoms of infection  - Monitor lab/diagnostic results  - Monitor all insertion sites, i.e. indwelling lines, tubes, and drains  - Monitor endotracheal if appropriate and nasal secretions for changes in amount and color  - [place] appropriate cooling/warming therapies per order  - Administer medications as ordered  - Instruct and encourage patient and family to use good hand hygiene technique  - Identify and instruct in appropriate isolation precautions for identified infection/condition  Outcome: Progressing     Problem: SAFETY ADULT  Goal: Patient will remain free of falls  Description: INTERVENTIONS:  - Educate patient/family on patient safety including physical limitations  - Instruct patient to call for assistance with activity   - Consult OT/PT to assist with strengthening/mobility   - Keep Call bell within reach  - Keep bed low and locked with side rails adjusted as appropriate  - Keep care items and personal belongings within reach  - Initiate and maintain comfort rounds  - Make Fall Risk Sign visible to staff  - Offer Toileting every 2 Hours, in advance of need  - Initiate/Maintain bed alarm  - Apply yellow socks and bracelet for high fall risk patients  - Consider  moving patient to room near nurses station  Outcome: Progressing  Goal: Maintain or return to baseline ADL function  Description: INTERVENTIONS:  -  Assess patient's ability to carry out ADLs; assess patient's baseline for ADL function and identify physical deficits which impact ability to perform ADLs (bathing, care of mouth/teeth, toileting, grooming, dressing, etc.)  - Assess/evaluate cause of self-care deficits   - Assess range of motion  - Assess patient's mobility; develop plan if impaired  - Assess patient's need for assistive devices and provide as appropriate  - Encourage maximum independence but intervene and supervise when necessary  - Involve family in performance of ADLs  - Assess for home care needs following discharge   - Consider OT consult to assist with ADL evaluation and planning for discharge  - Provide patient education as appropriate  Outcome: Progressing  Goal: Maintains/Returns to pre admission functional level  Description: INTERVENTIONS:  - Perform AM-PAC 6 Click Basic Mobility/ Daily Activity assessment daily.  - Set and communicate daily mobility goal to care team and patient/family/caregiver.   - Collaborate with rehabilitation services on mobility goals if consulted  - Reposition patient every 2 hours.  - Dangle patient 1 times a day  - Stand patient 1 times a day  - Out of bed to chair 1 times a day   - Out of bed for meals 1 times a day  - Out of bed for toileting  - Record patient progress and toleration of activity level   Outcome: Progressing     Problem: DISCHARGE PLANNING  Goal: Discharge to home or other facility with appropriate resources  Description: INTERVENTIONS:  - Identify barriers to discharge w/patient and caregiver  - Arrange for needed discharge resources and transportation as appropriate  - Identify discharge learning needs (meds, wound care, etc.)  - Arrange for interpretive services to assist at discharge as needed  - Refer to Case Management Department for  coordinating discharge planning if the patient needs post-hospital services based on physician/advanced practitioner order or complex needs related to functional status, cognitive ability, or social support system  Outcome: Progressing     Problem: Knowledge Deficit  Goal: Patient/family/caregiver demonstrates understanding of disease process, treatment plan, medications, and discharge instructions  Description: Complete learning assessment and assess knowledge base.  Interventions:  - Provide teaching at level of understanding  - Provide teaching via preferred learning methods  Outcome: Progressing     Problem: Nutrition/Hydration-ADULT  Goal: Nutrient/Hydration intake appropriate for improving, restoring or maintaining nutritional needs  Description: Monitor and assess patient's nutrition/hydration status for malnutrition. Collaborate with interdisciplinary team and initiate plan and interventions as ordered.  Monitor patient's weight and dietary intake as ordered or per policy. Utilize nutrition screening tool and intervene as necessary. Determine patient's food preferences and provide high-protein, high-caloric foods as appropriate.     INTERVENTIONS:  - Monitor oral intake, urinary output, labs, and treatment plans  - Assess nutrition and hydration status and recommend course of action  - Evaluate amount of meals eaten  - Assist patient with eating if necessary   - Allow adequate time for meals  - Recommend/ encourage appropriate diets, oral nutritional supplements, and vitamin/mineral supplements  - Order, calculate, and assess calorie counts as needed  - Recommend, monitor, and adjust tube feedings and TPN/PPN based on assessed needs  - Assess need for intravenous fluids  - Provide specific nutrition/hydration education as appropriate  - Include patient/family/caregiver in decisions related to nutrition  Outcome: Progressing     Problem: Prexisting or High Potential for Compromised Skin Integrity  Goal: Skin  integrity is maintained or improved  Description: INTERVENTIONS:  - Identify patients at risk for skin breakdown  - Assess and monitor skin integrity  - Assess and monitor nutrition and hydration status  - Monitor labs   - Assess for incontinence   - Turn and reposition patient  - Assist with mobility/ambulation  - Relieve pressure over bony prominences  - Avoid friction and shearing  - Provide appropriate hygiene as needed including keeping skin clean and dry  - Evaluate need for skin moisturizer/barrier cream  - Collaborate with interdisciplinary team   - Patient/family teaching  - Consider wound care consult   Outcome: Progressing

## 2024-06-20 ENCOUNTER — APPOINTMENT (INPATIENT)
Dept: RADIOLOGY | Facility: HOSPITAL | Age: 59
DRG: 208 | End: 2024-06-20
Payer: MEDICARE

## 2024-06-20 LAB
GLUCOSE SERPL-MCNC: 158 MG/DL (ref 65–140)
GLUCOSE SERPL-MCNC: 168 MG/DL (ref 65–140)
GLUCOSE SERPL-MCNC: 169 MG/DL (ref 65–140)
GLUCOSE SERPL-MCNC: 342 MG/DL (ref 65–140)

## 2024-06-20 PROCEDURE — 97535 SELF CARE MNGMENT TRAINING: CPT

## 2024-06-20 PROCEDURE — 97530 THERAPEUTIC ACTIVITIES: CPT

## 2024-06-20 PROCEDURE — 71045 X-RAY EXAM CHEST 1 VIEW: CPT

## 2024-06-20 PROCEDURE — 99232 SBSQ HOSP IP/OBS MODERATE 35: CPT | Performed by: INTERNAL MEDICINE

## 2024-06-20 PROCEDURE — 99231 SBSQ HOSP IP/OBS SF/LOW 25: CPT | Performed by: INTERNAL MEDICINE

## 2024-06-20 PROCEDURE — 82948 REAGENT STRIP/BLOOD GLUCOSE: CPT

## 2024-06-20 RX ORDER — ENOXAPARIN SODIUM 100 MG/ML
40 INJECTION SUBCUTANEOUS
Start: 2024-06-21 | End: 2024-06-20

## 2024-06-20 RX ORDER — LANOLIN ALCOHOL/MO/W.PET/CERES
400 CREAM (GRAM) TOPICAL 2 TIMES DAILY
Start: 2024-06-20 | End: 2024-06-24

## 2024-06-20 RX ORDER — AMOXICILLIN 250 MG
2 CAPSULE ORAL 2 TIMES DAILY
Start: 2024-06-20 | End: 2024-06-24

## 2024-06-20 RX ORDER — LISINOPRIL 5 MG/1
5 TABLET ORAL DAILY
Start: 2024-06-21 | End: 2024-06-21

## 2024-06-20 RX ORDER — OLANZAPINE 5 MG/1
5 TABLET, ORALLY DISINTEGRATING ORAL
Status: DISCONTINUED | OUTPATIENT
Start: 2024-06-20 | End: 2024-06-20

## 2024-06-20 RX ORDER — FOLIC ACID 1 MG/1
1 TABLET ORAL DAILY
Start: 2024-06-21 | End: 2024-06-24

## 2024-06-20 RX ORDER — OLANZAPINE 2.5 MG/1
5 TABLET, FILM COATED ORAL 4 TIMES DAILY PRN
Status: DISCONTINUED | OUTPATIENT
Start: 2024-06-20 | End: 2024-06-21 | Stop reason: HOSPADM

## 2024-06-20 RX ORDER — POLYETHYLENE GLYCOL 3350 17 G/17G
17 POWDER, FOR SOLUTION ORAL DAILY
Start: 2024-06-21 | End: 2024-06-24

## 2024-06-20 RX ORDER — ACETAMINOPHEN 325 MG/1
975 TABLET ORAL EVERY 8 HOURS SCHEDULED
Start: 2024-06-20 | End: 2024-06-24

## 2024-06-20 RX ORDER — QUETIAPINE 400 MG/1
400 TABLET, FILM COATED, EXTENDED RELEASE ORAL
Start: 2024-06-20

## 2024-06-20 RX ADMIN — LORATADINE 10 MG: 10 TABLET ORAL at 09:47

## 2024-06-20 RX ADMIN — ONDANSETRON 4 MG: 2 INJECTION INTRAMUSCULAR; INTRAVENOUS at 17:09

## 2024-06-20 RX ADMIN — Medication 400 MG: at 17:10

## 2024-06-20 RX ADMIN — BENZTROPINE MESYLATE 0.5 MG: 1 TABLET ORAL at 09:46

## 2024-06-20 RX ADMIN — QUETIAPINE FUMARATE 400 MG: 100 TABLET ORAL at 22:07

## 2024-06-20 RX ADMIN — LEVOTHYROXINE SODIUM 150 MCG: 0.15 TABLET ORAL at 09:47

## 2024-06-20 RX ADMIN — LACOSAMIDE 150 MG: 100 TABLET, FILM COATED ORAL at 09:47

## 2024-06-20 RX ADMIN — DIVALPROEX SODIUM 500 MG: 125 CAPSULE ORAL at 22:12

## 2024-06-20 RX ADMIN — LEVETIRACETAM 1500 MG: 500 TABLET, FILM COATED ORAL at 09:47

## 2024-06-20 RX ADMIN — SENNOSIDES AND DOCUSATE SODIUM 2 TABLET: 50; 8.6 TABLET ORAL at 17:11

## 2024-06-20 RX ADMIN — INSULIN LISPRO 5 UNITS: 100 INJECTION, SOLUTION INTRAVENOUS; SUBCUTANEOUS at 22:10

## 2024-06-20 RX ADMIN — ACETAMINOPHEN 975 MG: 325 TABLET, FILM COATED ORAL at 22:08

## 2024-06-20 RX ADMIN — QUETIAPINE FUMARATE 150 MG: 100 TABLET ORAL at 09:47

## 2024-06-20 RX ADMIN — ACETAMINOPHEN 975 MG: 325 TABLET, FILM COATED ORAL at 13:57

## 2024-06-20 RX ADMIN — ENOXAPARIN SODIUM 40 MG: 40 INJECTION SUBCUTANEOUS at 09:46

## 2024-06-20 RX ADMIN — PRAVASTATIN SODIUM 80 MG: 80 TABLET ORAL at 17:11

## 2024-06-20 RX ADMIN — Medication 400 MG: at 09:47

## 2024-06-20 RX ADMIN — INSULIN GLARGINE 5 UNITS: 100 INJECTION, SOLUTION SUBCUTANEOUS at 22:09

## 2024-06-20 RX ADMIN — CYANOCOBALAMIN TAB 500 MCG 500 MCG: 500 TAB at 09:48

## 2024-06-20 RX ADMIN — DIVALPROEX SODIUM 500 MG: 125 CAPSULE ORAL at 13:57

## 2024-06-20 RX ADMIN — GABAPENTIN 400 MG: 400 CAPSULE ORAL at 17:11

## 2024-06-20 RX ADMIN — LISINOPRIL 5 MG: 5 TABLET ORAL at 09:48

## 2024-06-20 RX ADMIN — FOLIC ACID 1 MG: 1 TABLET ORAL at 09:47

## 2024-06-20 RX ADMIN — Medication 15 ML: at 10:04

## 2024-06-20 RX ADMIN — LEVETIRACETAM 1500 MG: 500 TABLET, FILM COATED ORAL at 22:08

## 2024-06-20 RX ADMIN — DIVALPROEX SODIUM 500 MG: 125 CAPSULE ORAL at 06:25

## 2024-06-20 RX ADMIN — ACETAMINOPHEN 975 MG: 325 TABLET, FILM COATED ORAL at 06:24

## 2024-06-20 RX ADMIN — GABAPENTIN 400 MG: 400 CAPSULE ORAL at 22:08

## 2024-06-20 RX ADMIN — ZONISAMIDE 300 MG: 100 CAPSULE ORAL at 22:09

## 2024-06-20 RX ADMIN — LACOSAMIDE 200 MG: 100 TABLET, FILM COATED ORAL at 22:09

## 2024-06-20 RX ADMIN — QUETIAPINE FUMARATE 150 MG: 100 TABLET ORAL at 17:10

## 2024-06-20 NOTE — PROGRESS NOTES
[facility]  Progress Note  Name: Jairon Oconnell I  MRN: [id number]  Unit/Bed#: -01 I Date of Admission: 6/3/2024   Date of Service: 6/20/2024 I Hospital Day: 17    Assessment & Plan   Closed traumatic fracture of ribs of right side with pneumothorax  Assessment & Plan  Patient initially with 10 Fr chest tube, discontinued after initial resolution of pneumothorax  Complicated by hemothorax, 30 Fr chest tube placed which was retracted  Discontinued  14 Fr chest tube placed by IR, completed 3 days of TPA/dornase with good effect  Persistent right apical pneumothorax seen on x-ray June 19  Pulmonary consulted for assistance-this morning they placed the chest tube on waterseal yesterday and he was doing well however on moving with nursing staff in bed, his chest tube was displaced and removed.  Appreciate pulmonary support    * Cardiac arrest (HCC)  Assessment & Plan  Likely hypoxic  ECHO completed without acute findings  Continue supportive care    DM (diabetes mellitus) (HCC)  Assessment & Plan  Lab Results   Component Value Date    HGBA1C 6.7 (H) 06/14/2024       Recent Labs     06/19/24  1149 06/19/24  1606 06/19/24  2117 06/20/24  0632   POCGLU 167* 180* 197* 158*     Controlled with Lantus 5 units and lispro sliding scale    Blood Sugar Average: Last 72 hrs:  (P) [id number]      Dysphagia  Assessment & Plan  Modified diet  Appreciate speech support, continue modified diet    Ambulatory dysfunction  Assessment & Plan  Appreciate PT OT support  Per case management rounds, the patient would do better in the group home setting and thus the goals are this on discharge    Behavior concern in adult  Assessment & Plan  Continue home Seroquel    Acquired hypothyroidism  Assessment & Plan  Continue levothyroxine    HTN (hypertension)  Assessment & Plan  Continue lisinopril 5 mg.  Home PTA dosing 10 mg    HLD (hyperlipidemia)  Assessment & Plan  Continue home statin  equivalent    Generalized convulsive epilepsy (HCC)  Assessment & Plan  Continue PTA regimen  Seizure precautions               VTE Pharmacologic Prophylaxis: VTE Score: 6 High Risk (Score >/= 5) - Pharmacological DVT Prophylaxis Ordered: enoxaparin (Lovenox). Sequential Compression Devices Ordered.    Mobility:   Basic Mobility Inpatient Raw Score: 14  -HLM Goal: 4: Move to chair/commode  JH-HLM Achieved: 2: Bed activities/Dependent transfer  JH-HLM Goal NOT achieved. Continue with multidisciplinary rounding and encourage appropriate mobility to improve upon -HLM goals.    Patient Centered Rounds: I performed bedside rounds with nursing staff today.   Discussions with Specialists or Other Care Team Provider: Pulmonary    Education and Discussions with Family / Patient:  Will contact proxy this afternoon.     Total Time Spent on Date of Encounter in care of patient: 35 mins. This time was spent on one or more of the following: performing physical exam; counseling and coordination of care; obtaining or reviewing history; documenting in the medical record; reviewing/ordering tests, medications or procedures; communicating with other healthcare professionals and discussing with patient's family/caregivers.    Current Length of Stay: 17 day(s)  Current Patient Status: Inpatient   Certification Statement: The patient will continue to require additional inpatient hospital stay due to cardiac arrest  Discharge Plan: Anticipate discharge in 48-72 hrs to group home.    Code Status: Level 1 - Full Code    Subjective:   Patient is sleeping.  Some behavioral concerns reported overnight.  Appears comfortable.  Denies chest pain    Objective:     Vitals:   Temp (24hrs), Av.3 °F (36.8 °C), Min:98.3 °F (36.8 °C), Max:98.3 °F (36.8 °C)    Temp:  [98.3 °F (36.8 °C)] 98.3 °F (36.8 °C)  Resp:  [16-18] 18  BP: (111-121)/(71-72) 121/71  Body mass index is 23.98 kg/m².     Input and Output Summary (last 24 hours):      Intake/Output Summary (Last 24 hours) at 6/20/2024 0923  Last data filed at 6/20/2024 0059  Gross per 24 hour   Intake 420 ml   Output 750 ml   Net -330 ml       Physical Exam:   Physical Exam  Vitals and nursing note reviewed.   Constitutional:       Appearance: He is not ill-appearing or diaphoretic.   HENT:      Head: Normocephalic and atraumatic.      Mouth/Throat:      Mouth: Mucous membranes are moist.      Pharynx: No oropharyngeal exudate.   Pulmonary:      Effort: Pulmonary effort is normal. No respiratory distress.   Musculoskeletal:      Cervical back: Normal range of motion and neck supple. No rigidity.   Skin:     General: Skin is warm.      Coloration: Skin is not jaundiced.   Neurological:      Mental Status: He is alert.          Additional Data:     Labs:  Results from last 7 days   Lab Units 06/17/24  0552 06/15/24  0553 06/14/24  0602   WBC Thousand/uL 11.42*   < > 12.19*   HEMOGLOBIN g/dL 8.6*   < > 7.5*   HEMATOCRIT % 27.4*   < > 22.8*   PLATELETS Thousands/uL 517*   < > 353   SEGS PCT %  --   --  67   LYMPHO PCT %  --   --  19   MONO PCT %  --   --  7   EOS PCT %  --   --  4    < > = values in this interval not displayed.     Results from last 7 days   Lab Units 06/17/24  0552 06/15/24  0553 06/14/24  0602   SODIUM mmol/L 138   < > 143   POTASSIUM mmol/L 4.5   < > 4.1   CHLORIDE mmol/L 103   < > 106   CO2 mmol/L 29   < > 31   BUN mg/dL 23   < > 16   CREATININE mg/dL 0.61   < > 0.47*   ANION GAP mmol/L 6   < > 6   CALCIUM mg/dL 8.5   < > 8.9   ALBUMIN g/dL  --   --  3.1*   TOTAL BILIRUBIN mg/dL  --   --  0.30   ALK PHOS U/L  --   --  89   ALT U/L  --   --  59*   AST U/L  --   --  36   GLUCOSE RANDOM mg/dL 218*   < > 164*    < > = values in this interval not displayed.         Results from last 7 days   Lab Units 06/20/24  0632 06/19/24  2117 06/19/24  1606 06/19/24  1149 06/19/24  0710 06/18/24  2139 06/18/24  2113 06/18/24  1622 06/18/24  1141 06/18/24  0717 06/17/24  2101 06/17/24  1634    POC GLUCOSE mg/dl 158* 197* 180* 167* 220* 202* 146* 269* 304* 223* 313* 275*     Results from last 7 days   Lab Units 06/14/24  0602   HEMOGLOBIN A1C % 6.7*     Results from last 7 days   Lab Units 06/14/24  0602   PROCALCITONIN ng/ml 0.13       Lines/Drains:  Invasive Devices       Peripheral Intravenous Line  Duration             Long-Dwell Peripheral IV (Midline) 06/17/24 Right Basilic 2 days              Drain  Duration             Chest Tube 2 Right Posterior 14 Fr. 5 days    External Urinary Catheter 1 day                          Imaging: Personally reviewed the following imaging: chest xray    Recent Cultures (last 7 days):         Last 24 Hours Medication List:   Current Facility-Administered Medications   Medication Dose Route Frequency Provider Last Rate    acetaminophen  975 mg Oral Q8H [facility] HUNTER Nagel      benztropine  0.5 mg Oral Daily HUNTER Nagel      cyanocobalamin  500 mcg Oral Daily HUNTER Nagel      divalproex sodium  500 mg Oral Q8H [facility] HUNTER Nagel      enoxaparin  40 mg Subcutaneous Q24H [facility] HUNTER Nagel      folic acid  1 mg Oral Daily HUNTER Nagel      gabapentin  400 mg Oral TID HUNTER Nagel      insulin glargine  5 Units Subcutaneous HS HUNTER Nagel      insulin lispro  1-6 Units Subcutaneous TID AC HUNTER Nagel      insulin lispro  1-6 Units Subcutaneous HS HUNTER Nagel      insulin lispro  3 Units Subcutaneous TID With Meals HUNTER Nagel      lacosamide  150 mg Per NG Tube QAM HUNTER Nagel      lacosamide  200 mg Per NG Tube HS HUNTER Nagel      levETIRAcetam  1,500 mg Oral Q12H RONALD HUNTER Nagel      levothyroxine  150 mcg Oral Daily HUNTER Nagel      lidocaine  2 patch Topical Daily HUNTER Nagel      lisinopril  5 mg Oral Daily Amol Patel MD      loratadine  10 mg Oral Daily HUNTER Nagel      magnesium  Oxide  400 mg Oral BID HUNTER Nagel      Multivitamin  15 mL Oral Daily HUNTER Nagel      ondansetron  4 mg Intravenous Q6H PRN HUNTER Nagel      oxyCODONE  2.5 mg Oral Q6H PRN HUNTER Nagel      Or    oxyCODONE  5 mg Oral Q6H PRN HUNTER Nagel      polyethylene glycol  17 g Oral Daily HUNTER Nagel      pravastatin  80 mg Oral Daily With Dinner HUNTER Nagel      QUEtiapine  150 mg Oral BID HUNTER Nagel      QUEtiapine  400 mg Oral HS HUNTER Nagel      senna-docusate sodium  2 tablet Oral BID HUNTER Nagel      zonisamide  300 mg Oral HS HUNTER Nagel          Today, Patient Was Seen By: Amol Patel MD    **Please Note: This note may have been constructed using a voice recognition system.**

## 2024-06-20 NOTE — OCCUPATIONAL THERAPY NOTE
Occupational Therapy Progress Note     Patient Name: Jairon Oconnell  Today's Date: 6/20/2024  Problem List  Principal Problem:    Cardiac arrest (HCC)  Active Problems:    Generalized convulsive epilepsy (HCC)    HLD (hyperlipidemia)    HTN (hypertension)    Acquired hypothyroidism    Behavior concern in adult    Ambulatory dysfunction    Dysphagia    Closed traumatic fracture of ribs of right side with pneumothorax    DM (diabetes mellitus) (HCC)          06/20/24 0959   OT Last Visit   OT Visit Date 06/20/24   Note Type   Note Type Treatment   Pain Assessment   Pain Assessment Tool FLACC   Pain Rating: FLACC (Rest) - Face 0   Pain Rating: FLACC (Rest) - Legs 0   Pain Rating: FLACC (Rest) - Activity 1   Pain Rating: FLACC (Rest) - Cry 1   Pain Rating: FLACC (Rest) - Consolability 1   Score: FLACC (Rest) 3   Pain Rating: FLACC (Activity) - Face 0   Pain Rating: FLACC (Activity) - Legs 1   Pain Rating: FLACC (Activity) - Activity 1   Pain Rating: FLACC (Activity) - Cry 1   Pain Rating: FLACC (Activity) - Consolability 1   Score: FLACC (Activity) 4   Restrictions/Precautions   Weight Bearing Precautions Per Order Yes   RLE Weight Bearing Per Order WBAT   Other Precautions Cognitive;Chair Alarm;Bed Alarm;Fall Risk;Pain;Agitated   ADL   Where Assessed Other (Comment)  (Assist levels for some self care tasks are based on functional assessment of performance skills and deficits observed during session.)   Eating Assistance 3  Moderate Assistance   Eating Deficit Setup;Impulsive;Supervision/safety;Increased time to complete;Bringing food to mouth assist   Grooming Assistance 4  Minimal Assistance   Grooming Deficit Setup;Supervision/safety;Increased time to complete;Wash/dry face  (max VCs)   UB Dressing Assistance   (pt refused)   LB Dressing Assistance 1  Total Assistance   Toileting Assistance  1  Total Assistance   Bed Mobility   Rolling R 3  Moderate assistance   Additional items Assist x 2;Bedrails;Increased time  "required;Verbal cues;LE management   Rolling L 3  Moderate assistance   Additional items Assist x 2;Bedrails;Increased time required;Verbal cues;LE management   Supine to Sit Unable to assess   Additional Comments pt encouraged to attempt sitting EOB, however pt became agitated and declined to perform. Pt was assisted with positioning in upright position in the bed   Transfers   Sit to Stand Unable to assess   Subjective   Subjective \"Leave me alone.\" Pt agreeable to light ADLs, but declined further mobility   Cognition   Overall Cognitive Status Impaired   Arousal/Participation Responsive;Uncooperative   Attention Difficulty attending to directions   Orientation Level Oriented to person   Following Commands Follows one step commands with increased time or repetition   Activity Tolerance   Activity Tolerance Patient limited by fatigue;Treatment limited secondary to agitation   Medical Staff Made Aware Merlene PT  (Pt seen for co-treatment with Physical Therapist due to pt's medical complexity, functional limitations and limited activity tolerance.)   Assessment   Assessment Pt seen this date for skilled OT session focused on ADLs, functional transfers and mobility, safety education. The patient was received supine in bed, NAD, PIV access, on RA. He participated in functional bed mobility, LB dressing this date. Attempted UB dressing and further mobility, however pt declined to participate in these activities. Pt required Moderate Assistance x2 for rolling L/R in the bed. He refused further mobility despite maximum encouragement to get OOB to the chair. At end of session the patient was located in upright semi-foweler's position with call bell in reach and all needs met. Overall the patient remains below his functional baseline, and is primarily limited at this time due to generalized deconditioning, decreased activity tolerance, and mood limitation. OT will continue to follow while acute to address POC. At this time, " recommend Level 2 Moderate Resource Intensity upon d/c.   Plan   Treatment Interventions ADL retraining;Functional transfer training;UE strengthening/ROM;Endurance training;Patient/family training   Goal Expiration Date 07/04/24   OT Treatment Day 4   OT Frequency 3-5x/wk   Discharge Recommendation   Rehab Resource Intensity Level, OT II (Moderate Resource Intensity)   AM-PAC Daily Activity Inpatient   Lower Body Dressing 1   Bathing 2   Toileting 1   Upper Body Dressing 2   Grooming 2   Eating 2   Daily Activity Raw Score 10   Turning Head Towards Sound 3   Follow Simple Instructions 2   Low Function Daily Activity Raw Score 15   Low Function Daily Activity Standardized Score  26.28   AM-PAC Applied Cognition Inpatient   Following a Speech/Presentation 2   Understanding Ordinary Conversation 2   Taking Medications 1   Remembering Where Things Are Placed or Put Away 1   Remembering List of 4-5 Errands 1   Taking Care of Complicated Tasks 1   Applied Cognition Raw Score 8   Applied Cognition Standardized Score 19.32       Previously established goals remain appropriate. Updated Goal Date: 7/4/24      LUKE Worthington/JANEL

## 2024-06-20 NOTE — PLAN OF CARE
Problem: PAIN - ADULT  Goal: Verbalizes/displays adequate comfort level or baseline comfort level  Description: Interventions:  - Encourage patient to monitor pain and request assistance  - Assess pain using appropriate pain scale  - Administer analgesics based on type and severity of pain and evaluate response  - Implement non-pharmacological measures as appropriate and evaluate response  - Consider cultural and social influences on pain and pain management  - Notify physician/advanced practitioner if interventions unsuccessful or patient reports new pain  Outcome: Progressing     Problem: INFECTION - ADULT  Goal: Absence or prevention of progression during hospitalization  Description: INTERVENTIONS:  - Assess and monitor for signs and symptoms of infection  - Monitor lab/diagnostic results  - Monitor all insertion sites, i.e. indwelling lines, tubes, and drains  - Monitor endotracheal if appropriate and nasal secretions for changes in amount and color  - [place] appropriate cooling/warming therapies per order  - Administer medications as ordered  - Instruct and encourage patient and family to use good hand hygiene technique  - Identify and instruct in appropriate isolation precautions for identified infection/condition  Outcome: Progressing     Problem: SAFETY ADULT  Goal: Patient will remain free of falls  Description: INTERVENTIONS:  - Educate patient/family on patient safety including physical limitations  - Instruct patient to call for assistance with activity   - Consult OT/PT to assist with strengthening/mobility   - Keep Call bell within reach  - Keep bed low and locked with side rails adjusted as appropriate  - Keep care items and personal belongings within reach  - Initiate and maintain comfort rounds  - Make Fall Risk Sign visible to staff  - Offer Toileting every 2 Hours, in advance of need  - Initiate/Maintain bed alarm  - Apply yellow socks and bracelet for high fall risk patients  - Consider  moving patient to room near nurses station  Outcome: Progressing  Goal: Maintain or return to baseline ADL function  Description: INTERVENTIONS:  -  Assess patient's ability to carry out ADLs; assess patient's baseline for ADL function and identify physical deficits which impact ability to perform ADLs (bathing, care of mouth/teeth, toileting, grooming, dressing, etc.)  - Assess/evaluate cause of self-care deficits   - Assess range of motion  - Assess patient's mobility; develop plan if impaired  - Assess patient's need for assistive devices and provide as appropriate  - Encourage maximum independence but intervene and supervise when necessary  - Involve family in performance of ADLs  - Assess for home care needs following discharge   - Consider OT consult to assist with ADL evaluation and planning for discharge  - Provide patient education as appropriate  Outcome: Progressing  Goal: Maintains/Returns to pre admission functional level  Description: INTERVENTIONS:  - Perform AM-PAC 6 Click Basic Mobility/ Daily Activity assessment daily.  - Set and communicate daily mobility goal to care team and patient/family/caregiver.   - Collaborate with rehabilitation services on mobility goals if consulted  - Reposition patient every 2 hours.  - Dangle patient 1 times a day  - Stand patient 1 times a day  - Out of bed to chair 1 times a day   - Out of bed for meals 1 times a day  - Out of bed for toileting  - Record patient progress and toleration of activity level   Outcome: Progressing

## 2024-06-20 NOTE — PROGRESS NOTES
Progress Note - Pulmonary   Jairon Oconnell 59 y.o. male MRN: [id number]  Unit/Bed#: -01 Encounter: [id number]    Assessment:  Acute hypoxemic respiratory failure, improved  2.   Right rib fracture or pneumothorax  3.   Right pleural effusion (hemothorax)    Plan:  Patient with cardiac arrest in the setting of choking status post ROSC and extubation..  Complicated by multiple rib fractures, pneumothorax and hemothorax status post chest tubes  He remains on room air  He had 1 chest tube remaining although yesterday was dislodged during movement.  Had plan to remove chest tube this morning regardless  I did remove the sutures that were holding the chest tube in place this morning  Will plan for repeat chest x-ray to be sure no recurrence of the pneumothorax  Will follow intermittently, please call with questions    Subjective:   Patient resting in bed. He is comfortable, no acute complaints.     Objective:     Vitals: Blood pressure 121/71, pulse 98, temperature 98.3 °F (36.8 °C), temperature source Oral, resp. rate 18, height 6' (1.829 m), weight 80.2 kg (176 lb 12.9 oz), SpO2 94%.,Body mass index is 23.98 kg/m².      Intake/Output Summary (Last 24 hours) at 6/20/2024 1055  Last data filed at 6/20/2024 0900  Gross per 24 hour   Intake 440 ml   Output 750 ml   Net -310 ml       Invasive Devices       Peripheral Intravenous Line  Duration             Long-Dwell Peripheral IV (Midline) 06/17/24 Right Basilic 2 days              Drain  Duration             Chest Tube 2 Right Posterior 14 Fr. 5 days    External Urinary Catheter 1 day                    Physical Exam: /71 (BP Location: Right arm)   Pulse 98   Temp 98.3 °F (36.8 °C) (Oral)   Resp 18   Ht 6' (1.829 m)   Wt 80.2 kg (176 lb 12.9 oz)   SpO2 94%   BMI 23.98 kg/m²   General appearance: alert and no distress  Head: Normocephalic, without obvious abnormality, atraumatic  Eyes: negative findings: conjunctivae and sclerae normal  Lungs: diminished  "breath sounds  Heart: regular rate and rhythm  Abdomen: normal findings: soft, non-tender  Extremities:  no edema  Skin:  warm and dry  Neurologic: Mental status: Alert, oriented, thought content appropriate     Labs: I have personally reviewed pertinent lab results., CBC: No results found for: \"WBC\", \"HGB\", \"HCT\", \"MCV\", \"PLT\", \"ADJUSTEDWBC\", \"RBC\", \"MCH\", \"MCHC\", \"RDW\", \"MPV\", \"NRBC\", CMP: No results found for: \"SODIUM\", \"K\", \"CL\", \"CO2\", \"ANIONGAP\", \"BUN\", \"CREATININE\", \"GLUCOSE\", \"CALCIUM\", \"AST\", \"ALT\", \"ALKPHOS\", \"PROT\", \"BILITOT\", \"EGFR\"  Imaging and other studies: I have personally reviewed pertinent reports.   and I have personally reviewed pertinent films in PACS    "

## 2024-06-20 NOTE — PLAN OF CARE
Problem: PHYSICAL THERAPY ADULT  Goal: Performs mobility at highest level of function for planned discharge setting.  See evaluation for individualized goals.  Description: Treatment/Interventions: Functional transfer training, LE strengthening/ROM, Therapeutic exercise, Endurance training, Cognitive reorientation, Patient/family training, Bed mobility, Continued evaluation, Spoke to nursing, OT          See flowsheet documentation for full assessment, interventions and recommendations.  Outcome: Not Progressing  Note: Prognosis: Fair  Problem List: Decreased strength, Decreased endurance, Impaired balance, Decreased mobility, Decreased cognition, Pain  Assessment: Pt seen for PT treatment session this date, consisting of ther act focused on bed mobility, LB exercise to tolerance with max vc for technique . Pt with increased agitation/ combative this date towards therapists with attempts to kick, repeatedly cursing throughout session. Significant time spent on education for importance of mobility, pt not cooperative with mobilizing OOB, was agreeable to repositioning in bed/ rolling for linen change. Recommend pt mobilize OOB with NSG staff as able. Pertinent barriers during this session include cognitive status & agitation. Current goals and POC established on IE remain appropriate, pt continues to have rehab potential and is making minimal progress towards STGs. Pt prognosis for achieving goals is fair, pending pt progress with hospitalization/medical status improvements, and indicated by Stimulability. Pt limited d/t poor orientation, lack of self-control (impulsivity), self limiting, and lack of ability to demonstrate mobility and/or self-care activities. Pt continues to be functioning below baseline level, and remains limited 2* factors listed above. PT will continue to see pt during current hospitalization in order to address the deficits listed above and provide interventions consistent w/ POC in effort to  achieve STGs. PT recommends level 2, moderate resource intensity upon discharge.  Barriers to Discharge: Inaccessible home environment, Decreased caregiver support     Rehab Resource Intensity Level, PT: II (Moderate Resource Intensity)    See flowsheet documentation for full assessment.

## 2024-06-20 NOTE — ASSESSMENT & PLAN NOTE
Lab Results   Component Value Date    HGBA1C 6.7 (H) 06/14/2024       Recent Labs     06/19/24  1149 06/19/24  1606 06/19/24  2117 06/20/24  0632   POCGLU 167* 180* 197* 158*     Controlled with Lantus 5 units and lispro sliding scale    Blood Sugar Average: Last 72 hrs:  (P) [id number]

## 2024-06-20 NOTE — ASSESSMENT & PLAN NOTE
Patient initially with 10 Fr chest tube, discontinued after initial resolution of pneumothorax  Complicated by hemothorax, 30 Fr chest tube placed which was retracted  Discontinued  14 Fr chest tube placed by IR, completed 3 days of TPA/dornase with good effect  Persistent right apical pneumothorax seen on x-ray June 19  Pulmonary consulted for assistance-this morning they placed the chest tube on waterseal yesterday and he was doing well however on moving with nursing staff in bed, his chest tube was displaced and removed.  Appreciate pulmonary support

## 2024-06-20 NOTE — PHYSICAL THERAPY NOTE
"Physical Therapy Treatment Note       06/20/24 1027   PT Last Visit   PT Visit Date 06/20/24   Note Type   Note Type Treatment   Pain Assessment   Pain Assessment Tool FLACC   Pain Rating: FLACC (Rest) - Face 0   Pain Rating: FLACC (Rest) - Legs 0   Pain Rating: FLACC (Rest) - Activity 1   Pain Rating: FLACC (Rest) - Cry 1   Pain Rating: FLACC (Rest) - Consolability 1   Score: FLACC (Rest) 3   Pain Rating: FLACC (Activity) - Face 0   Pain Rating: FLACC (Activity) - Legs 1   Pain Rating: FLACC (Activity) - Activity 1   Pain Rating: FLACC (Activity) - Cry 1   Pain Rating: FLACC (Activity) - Consolability 1   Score: FLACC (Activity) 4   Restrictions/Precautions   Weight Bearing Precautions Per Order Yes   RLE Weight Bearing Per Order WBAT   Other Precautions Cognitive;Chair Alarm;Bed Alarm;Fall Risk;Pain;Agitated;Combative  (condom catheter)   General   Chart Reviewed Yes   Response to Previous Treatment Patient unable to report, no changes reported from family or staff   Family/Caregiver Present No   Cognition   Overall Cognitive Status Impaired   Arousal/Participation Alert;Uncooperative;Arousable   Attention Difficulty attending to directions   Orientation Level Oriented to person;Disoriented to place;Disoriented to time;Disoriented to situation   Following Commands Follows one step commands inconsistently   Comments pt agreeable to PT with maximal encouragement, education on importance of OOB mobility however pt adamantly refusing to mobilize OOB; did agree to bedlevel session; pt agitated at times, pt repeatedly cursing throughout session, attempting to kick therapist/ agitated/ combative   Subjective   Subjective \"leave me alone\"   Bed Mobility   Rolling R 3  Moderate assistance   Additional items Assist x 2;HOB elevated;Bedrails;Increased time required;Verbal cues;LE management   Rolling L 3  Moderate assistance   Additional items Assist x 2;HOB elevated;Bedrails;Increased time required;Verbal cues;LE management "   Additional Comments encouraged use of siderail to assist in sidelying positioning for offloading buttocks for pressure relief, pt able to tolerate side lying bilaterally. pt positioned in upright bed position to encourage upright posture/ sitting tolerance   Endurance Deficit   Endurance Deficit Yes   Activity Tolerance   Activity Tolerance Patient limited by fatigue;Treatment limited secondary to agitation   Medical Staff Made Aware Co-treat performed with OT secondary to complex medical condition of patient and regression of functional status from baseline. PT/OT goals were addressed separately.   Nurse Made Aware DOUGLAS Henry   Exercises   Heelslides Supine;5 reps;AAROM;Bilateral   Knee AROM Short Arc Quad Supine;5 reps;AAROM;Bilateral   Assessment   Prognosis Fair   Problem List Decreased strength;Decreased endurance;Impaired balance;Decreased mobility;Decreased cognition;Pain   Assessment Pt seen for PT treatment session this date, consisting of ther act focused on bed mobility, LB exercise to tolerance with max vc for technique . Pt with increased agitation/ combative this date towards therapists with attempts to kick, repeatedly cursing throughout session. Significant time spent on education for importance of mobility, pt not cooperative with mobilizing OOB, was agreeable to repositioning in bed/ rolling for linen change. Recommend pt mobilize OOB with NSG staff as able. Pertinent barriers during this session include cognitive status & agitation. Current goals and POC established on IE remain appropriate, pt continues to have rehab potential and is making minimal progress towards STGs. Pt prognosis for achieving goals is fair, pending pt progress with hospitalization/medical status improvements, and indicated by Stimulability. Pt limited d/t poor orientation, lack of self-control (impulsivity), self limiting, and lack of ability to demonstrate mobility and/or self-care activities. Pt continues to be functioning  below baseline level, and remains limited 2* factors listed above. PT will continue to see pt during current hospitalization in order to address the deficits listed above and provide interventions consistent w/ POC in effort to achieve STGs. PT recommends level 2, moderate resource intensity upon discharge.   Barriers to Discharge Inaccessible home environment;Decreased caregiver support   Goals   Patient Goals none expressed   STG Expiration Date 06/26/24   PT Treatment Day 5   Plan   Treatment/Interventions Functional transfer training;LE strengthening/ROM;Therapeutic exercise;Endurance training;Cognitive reorientation;Patient/family training;Bed mobility;Continued evaluation;Spoke to nursing;OT   Progress Slow progress, cognitive deficits   PT Frequency 3-5x/wk   Discharge Recommendation   Rehab Resource Intensity Level, PT II (Moderate Resource Intensity)   AM-PAC Basic Mobility Inpatient   Turning in Flat Bed Without Bedrails 2   Lying on Back to Sitting on Edge of Flat Bed Without Bedrails 2   Moving Bed to Chair 2   Standing Up From Chair Using Arms 2   Walk in Room 2   Climb 3-5 Stairs With Railing 1   Basic Mobility Inpatient Raw Score 11   Basic Mobility Standardized Score 30.25   [facility] Highest Level Of Mobility   -HLM Goal 4: Move to chair/commode   -HLM Achieved 2: Bed activities/Dependent transfer   Education   Education Provided Mobility training   Patient Reinforcement needed   End of Consult   Patient Position at End of Consult Supine;Bed/Chair alarm activated;All needs within reach       Merlene Reyes, PT, DPT

## 2024-06-20 NOTE — PLAN OF CARE
Problem: OCCUPATIONAL THERAPY ADULT  Goal: Performs self-care activities at highest level of function for planned discharge setting.  See evaluation for individualized goals.  Description: Treatment Interventions: ADL retraining, Functional transfer training, UE strengthening/ROM, Endurance training, Patient/family training          See flowsheet documentation for full assessment, interventions and recommendations.   Outcome: Not Progressing  Note: Limitation: Decreased ADL status, Decreased UE strength, Decreased Safe judgement during ADL, Decreased cognition, Decreased endurance, Decreased self-care trans, Decreased high-level ADLs, Decreased fine motor control, Decreased UE ROM  Prognosis: Good  Assessment: Pt seen this date for skilled OT session focused on ADLs, functional transfers and mobility, safety education. The patient was received supine in bed, NAD, PIV access, on RA. He participated in functional bed mobility, LB dressing this date. Attempted UB dressing and further mobility, however pt declined to participate in these activities. Pt required Moderate Assistance x2 for rolling L/R in the bed. He refused further mobility despite maximum encouragement to get OOB to the chair. At end of session the patient was located in upright semi-foweler's position with call bell in reach and all needs met. Overall the patient remains below his functional baseline, and is primarily limited at this time due to generalized deconditioning, decreased activity tolerance, and mood limitation. OT will continue to follow while acute to address POC. At this time, recommend Level 2 Moderate Resource Intensity upon d/c.     Rehab Resource Intensity Level, OT: II (Moderate Resource Intensity)        Neha Velasquez OTR/L

## 2024-06-20 NOTE — ASSESSMENT & PLAN NOTE
Appreciate PT OT support  Per case management rounds, the patient would do better in the group home setting and thus the goals are this on discharge

## 2024-06-20 NOTE — CASE MANAGEMENT
Case Management Discharge Planning Note    Patient name Jairon Oconnell  Location /-01 MRN [id number]  : 1965 Date 2024       Current Admission Date: 6/3/2024  Current Admission Diagnosis:Cardiac arrest ([facility])   Patient Active Problem List    Diagnosis Date Noted Date Diagnosed    DM (diabetes mellitus) ([facility]) 2024     Cardiac arrest ([facility]) 2024     Dysphagia 2024     Closed traumatic fracture of ribs of right side with pneumothorax 2024     Urinary retention 2024     Nondisplaced fracture of greater trochanter of right femur, subsequent encounter for closed fracture with routine healing 2024     Closed nondisplaced fracture of proximal phalanx of left index finger with routine healing, subsequent encounter 10/30/2023     Pressure ulcer of sacral region, stage 3 ([facility]) 2023     Ambulatory dysfunction 2022     Social problem 2021     Hypomagnesemia 2021     Thrombocytopathia ([facility]) 2020     Hyponatremia 2019     Metabolic encephalopathy 2019     Seborrheic dermatitis of scalp 2019     Nearsightedness 2019     Behavior concern in adult 2019     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     Vitamin B12 deficiency 2017     HLD (hyperlipidemia) 2016     Vitamin D deficiency 2016     Type 2 diabetes mellitus with diabetic neuropathy, without long-term current use of insulin ([facility]) 06/15/2016     Acquired hypothyroidism 06/15/2016     Cataract 2013     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     Generalized convulsive epilepsy ([facility]) 2013     HTN (hypertension) 2013     Osteoporosis 2013     Scoliosis 2013       LOS (days): 17  Geometric Mean LOS (GMLOS) (days): 5.2  Days to GMLOS:-11.4     OBJECTIVE:  Risk of Unplanned Readmission Score: 27.1         Current admission status: Inpatient   Preferred Pharmacy:   [facility]  [place] - [address]  [address]  [place] [address]  Phone: [phone] Fax: [phone]    [id number] [place] [address]  [address]  [facility] [place] [address]  Phone: [phone] Fax: [phone]    Primary Care Provider: HUNTER Brown    Primary Insurance: MEDICARE  Secondary Insurance: [place] MEDICAL ASSISTANCE    DISCHARGE DETAILS:    Discharge planning discussed with:: Pt caregivers betty  Freedom of Choice: Yes  Comments - Freedom of Choice: CM called and spoke with pt sister Kemi and pt caregiver Betty lizign pt dc. As per SLIM, pt remains medically cleared for dc. Pt caregiver is aware of pt diet ( pure with thicken liquids) and states that she needs to get some supplies ready at home for pt to return. Caregiver wishes to have pt dc home tomorrow. SLIM notified. [facility] reserved and made aware via [facility]. CM continue to follow and assist with pt dc plans.  CM contacted family/caregiver?: Yes  Were Treatment Team discharge recommendations reviewed with patient/caregiver?: Yes  Did patient/caregiver verbalize understanding of patient care needs?: Yes  Were patient/caregiver advised of the risks associated with not following Treatment Team discharge recommendations?: Yes    Contacts  Patient Contacts: Kemi Decker ( sister) betty (Care Giver)  Relationship to Patient:: Family  Contact Method: Phone  Phone Number: [phone]  Reason/Outcome: Continuity of Care, Emergency Contact, Referral, Discharge Planning    Requested Home Health Care         Is the patient interested in HHC at discharge?: Yes  Home Health Discipline requested:: Nursing, Occupational Therapy, Physical Therapy  Home Health Agency Name:: [facility]  A External Referral Reason (only applicable if external HHA name selected): Patient has established relationship with provider  Home Health Follow-Up Provider:: PCP  Home Health Services Needed:: Evaluate  Functional Status and Safety, Gait/ADL Training, Strengthening/Theraputic Exercises to Improve Function, Diabetes Management  Homebound Criteria Met:: Requires the Assistance of Another Person for Safe Ambulation or to Leave the Home, Uses an Assist Device (i.e. cane, walker, etc)  Supporting Clincal Findings:: Bed Bound or Wheelchair Bound, Limited Endurance, Fatigues Easliy in Short Distances    DME Referral Provided  Referral made for DME?: No    Other Referral/Resources/Interventions Provided:  Interventions: [facility]    Would you like to participate in our [facility] service program?  : No - Declined    Treatment Team Recommendation: Home with Home Health Care, SNF, Short Term Rehab  Discharge Destination Plan:: Home with Home Health Care  Transport at Discharge : [facility]  Dispatcher Contacted: No               IMM Given (Date):: 06/20/24  IMM Given to:: Patient  Family notified:: Pt sister Kemi  Additional Comments: CM reviewed IMM with pt sister over the phone. Pt sister expressed full and complete understanding. Copy given and original placed in MR

## 2024-06-20 NOTE — PLAN OF CARE
Problem: PAIN - ADULT  Goal: Verbalizes/displays adequate comfort level or baseline comfort level  Description: Interventions:  - Encourage patient to monitor pain and request assistance  - Assess pain using appropriate pain scale  - Administer analgesics based on type and severity of pain and evaluate response  - Implement non-pharmacological measures as appropriate and evaluate response  - Consider cultural and social influences on pain and pain management  - Notify physician/advanced practitioner if interventions unsuccessful or patient reports new pain  Outcome: Progressing     Problem: INFECTION - ADULT  Goal: Absence or prevention of progression during hospitalization  Description: INTERVENTIONS:  - Assess and monitor for signs and symptoms of infection  - Monitor lab/diagnostic results  - Monitor all insertion sites, i.e. indwelling lines, tubes, and drains  - Monitor endotracheal if appropriate and nasal secretions for changes in amount and color  - [place] appropriate cooling/warming therapies per order  - Administer medications as ordered  - Instruct and encourage patient and family to use good hand hygiene technique  - Identify and instruct in appropriate isolation precautions for identified infection/condition  Outcome: Progressing     Problem: Nutrition/Hydration-ADULT  Goal: Nutrient/Hydration intake appropriate for improving, restoring or maintaining nutritional needs  Description: Monitor and assess patient's nutrition/hydration status for malnutrition. Collaborate with interdisciplinary team and initiate plan and interventions as ordered.  Monitor patient's weight and dietary intake as ordered or per policy. Utilize nutrition screening tool and intervene as necessary. Determine patient's food preferences and provide high-protein, high-caloric foods as appropriate.     INTERVENTIONS:  - Monitor oral intake, urinary output, labs, and treatment plans  - Assess nutrition and hydration status and  recommend course of action  - Evaluate amount of meals eaten  - Assist patient with eating if necessary   - Allow adequate time for meals  - Recommend/ encourage appropriate diets, oral nutritional supplements, and vitamin/mineral supplements  - Order, calculate, and assess calorie counts as needed  - Recommend, monitor, and adjust tube feedings and TPN/PPN based on assessed needs  - Assess need for intravenous fluids  - Provide specific nutrition/hydration education as appropriate  - Include patient/family/caregiver in decisions related to nutrition  Outcome: Progressing     Problem: Prexisting or High Potential for Compromised Skin Integrity  Goal: Skin integrity is maintained or improved  Description: INTERVENTIONS:  - Identify patients at risk for skin breakdown  - Assess and monitor skin integrity  - Assess and monitor nutrition and hydration status  - Monitor labs   - Assess for incontinence   - Turn and reposition patient  - Assist with mobility/ambulation  - Relieve pressure over bony prominences  - Avoid friction and shearing  - Provide appropriate hygiene as needed including keeping skin clean and dry  - Evaluate need for skin moisturizer/barrier cream  - Collaborate with interdisciplinary team   - Patient/family teaching  - Consider wound care consult   Outcome: Progressing

## 2024-06-20 NOTE — PROGRESS NOTES
Patient:    MRN:  [id number]    [place] Request ID:  [id number]    Level of care reserved:  Home Health Agency    Partner Reserved:  [facility] [place] [id number] [phone]    Clinical needs requested:    Geography searched:  [id number]    Start of Service:    Request sent:  3:01pm EDT on 6/5/2024 by Ashley Dixon    Partner reserved:  9:57am EDT on 6/8/2024 by Tanvi Gutierrez    Choice list shared:  4:46pm EDT on 6/7/2024 by Tanvi Gutierrez

## 2024-06-21 ENCOUNTER — TRANSITIONAL CARE MANAGEMENT (OUTPATIENT)
Dept: FAMILY MEDICINE CLINIC | Facility: CLINIC | Age: 59
End: 2024-06-21

## 2024-06-21 VITALS
SYSTOLIC BLOOD PRESSURE: 110 MMHG | RESPIRATION RATE: 17 BRPM | DIASTOLIC BLOOD PRESSURE: 74 MMHG | TEMPERATURE: 97.4 F | BODY MASS INDEX: 23.95 KG/M2 | HEIGHT: 72 IN | WEIGHT: 176.81 LBS | OXYGEN SATURATION: 95 % | HEART RATE: 104 BPM

## 2024-06-21 LAB
GLUCOSE SERPL-MCNC: 191 MG/DL (ref 65–140)
GLUCOSE SERPL-MCNC: 229 MG/DL (ref 65–140)

## 2024-06-21 PROCEDURE — 82948 REAGENT STRIP/BLOOD GLUCOSE: CPT

## 2024-06-21 PROCEDURE — 99238 HOSP IP/OBS DSCHRG MGMT 30/<: CPT | Performed by: INTERNAL MEDICINE

## 2024-06-21 RX ORDER — LISINOPRIL 5 MG/1
5 TABLET ORAL DAILY
Qty: 30 TABLET | Refills: 0 | Status: SHIPPED | OUTPATIENT
Start: 2024-06-21

## 2024-06-21 RX ADMIN — GABAPENTIN 400 MG: 400 CAPSULE ORAL at 08:15

## 2024-06-21 RX ADMIN — FOLIC ACID 1 MG: 1 TABLET ORAL at 08:15

## 2024-06-21 RX ADMIN — QUETIAPINE FUMARATE 150 MG: 100 TABLET ORAL at 08:14

## 2024-06-21 RX ADMIN — LORATADINE 10 MG: 10 TABLET ORAL at 08:14

## 2024-06-21 RX ADMIN — LISINOPRIL 5 MG: 5 TABLET ORAL at 08:16

## 2024-06-21 RX ADMIN — CYANOCOBALAMIN TAB 500 MCG 500 MCG: 500 TAB at 08:18

## 2024-06-21 RX ADMIN — Medication 400 MG: at 08:15

## 2024-06-21 RX ADMIN — BENZTROPINE MESYLATE 0.5 MG: 1 TABLET ORAL at 08:16

## 2024-06-21 RX ADMIN — Medication 15 ML: at 08:18

## 2024-06-21 RX ADMIN — LACOSAMIDE 150 MG: 100 TABLET, FILM COATED ORAL at 08:15

## 2024-06-21 RX ADMIN — LEVETIRACETAM 1500 MG: 500 TABLET, FILM COATED ORAL at 08:14

## 2024-06-21 RX ADMIN — ACETAMINOPHEN 975 MG: 325 TABLET, FILM COATED ORAL at 06:33

## 2024-06-21 RX ADMIN — DIVALPROEX SODIUM 500 MG: 125 CAPSULE ORAL at 06:37

## 2024-06-21 RX ADMIN — SENNOSIDES AND DOCUSATE SODIUM 2 TABLET: 50; 8.6 TABLET ORAL at 08:15

## 2024-06-21 RX ADMIN — LEVOTHYROXINE SODIUM 150 MCG: 0.15 TABLET ORAL at 08:14

## 2024-06-21 NOTE — PLAN OF CARE
Problem: PAIN - ADULT  Goal: Verbalizes/displays adequate comfort level or baseline comfort level  Description: Interventions:  - Encourage patient to monitor pain and request assistance  - Assess pain using appropriate pain scale  - Administer analgesics based on type and severity of pain and evaluate response  - Implement non-pharmacological measures as appropriate and evaluate response  - Consider cultural and social influences on pain and pain management  - Notify physician/advanced practitioner if interventions unsuccessful or patient reports new pain  Outcome: Progressing     Problem: INFECTION - ADULT  Goal: Absence or prevention of progression during hospitalization  Description: INTERVENTIONS:  - Assess and monitor for signs and symptoms of infection  - Monitor lab/diagnostic results  - Monitor all insertion sites, i.e. indwelling lines, tubes, and drains  - Monitor endotracheal if appropriate and nasal secretions for changes in amount and color  - [place] appropriate cooling/warming therapies per order  - Administer medications as ordered  - Instruct and encourage patient and family to use good hand hygiene technique  - Identify and instruct in appropriate isolation precautions for identified infection/condition  Outcome: Progressing     Problem: Prexisting or High Potential for Compromised Skin Integrity  Goal: Skin integrity is maintained or improved  Description: INTERVENTIONS:  - Identify patients at risk for skin breakdown  - Assess and monitor skin integrity  - Assess and monitor nutrition and hydration status  - Monitor labs   - Assess for incontinence   - Turn and reposition patient  - Assist with mobility/ambulation  - Relieve pressure over bony prominences  - Avoid friction and shearing  - Provide appropriate hygiene as needed including keeping skin clean and dry  - Evaluate need for skin moisturizer/barrier cream  - Collaborate with interdisciplinary team   - Patient/family teaching  -  Consider wound care consult   Outcome: Progressing     Problem: Nutrition/Hydration-ADULT  Goal: Nutrient/Hydration intake appropriate for improving, restoring or maintaining nutritional needs  Description: Monitor and assess patient's nutrition/hydration status for malnutrition. Collaborate with interdisciplinary team and initiate plan and interventions as ordered.  Monitor patient's weight and dietary intake as ordered or per policy. Utilize nutrition screening tool and intervene as necessary. Determine patient's food preferences and provide high-protein, high-caloric foods as appropriate.     INTERVENTIONS:  - Monitor oral intake, urinary output, labs, and treatment plans  - Assess nutrition and hydration status and recommend course of action  - Evaluate amount of meals eaten  - Assist patient with eating if necessary   - Allow adequate time for meals  - Recommend/ encourage appropriate diets, oral nutritional supplements, and vitamin/mineral supplements  - Order, calculate, and assess calorie counts as needed  - Recommend, monitor, and adjust tube feedings and TPN/PPN based on assessed needs  - Assess need for intravenous fluids  - Provide specific nutrition/hydration education as appropriate  - Include patient/family/caregiver in decisions related to nutrition  Outcome: Progressing

## 2024-06-21 NOTE — DISCHARGE SUMMARY
[facility]  Discharge- Jairon Oconnell 1965, 59 y.o. male MRN: [id number]  Unit/Bed#: -01 Encounter: [id number]  Primary Care Provider: HUNTER Brown   Date and time admitted to hospital: 6/3/2024  8:15 PM    Closed traumatic fracture of ribs of right side with pneumothorax  Assessment & Plan  Patient initially with 10 Fr chest tube, discontinued after initial resolution of pneumothorax  Complicated by hemothorax, 30 Fr chest tube placed which was retracted  Discontinued  14 Fr chest tube placed by IR, completed 3 days of TPA/dornase with good effect  Persistent right apical pneumothorax seen on x-ray June 19  Pulmonary consulted for assistance-this morning they placed the chest tube on waterseal yesterday and he was doing well however on moving with nursing staff in bed, his chest tube was displaced and removed.  Appreciate pulmonary support    * Cardiac arrest (HCC)  Assessment & Plan  Likely hypoxic  ECHO completed without acute findings  Continue supportive care    DM (diabetes mellitus) (HCC)  Assessment & Plan  Lab Results   Component Value Date    HGBA1C 6.7 (H) 06/14/2024       Recent Labs     06/20/24  1126 06/20/24  1555 06/20/24  2054 06/21/24  0718   POCGLU 168* 169* 342* 191*     Resume home oral hypoglycemics    Blood Sugar Average: Last 72 hrs:  (P) [id number]      Dysphagia  Assessment & Plan  Modified diet      Ambulatory dysfunction  Assessment & Plan  Appreciate PT OT support  Was to return home to the group setting    Behavior concern in adult  Assessment & Plan  Continue home Seroquel    Acquired hypothyroidism  Assessment & Plan  Continue levothyroxine    HTN (hypertension)  Assessment & Plan  Continue lisinopril 5 mg due to normal blood pressures achieved.  Home PTA dosing 10 mg can be increased if increasing blood pressures while outpatient    HLD (hyperlipidemia)  Assessment & Plan  Continue home statin equivalent    Generalized convulsive  epilepsy (HCC)  Assessment & Plan  Continue PTA regimen  Seizure precautions        Medical Problems       Resolved Problems  Date Reviewed: 6/16/2024            Resolved    Lactic acidosis 6/7/2024     Resolved by  HUNTER Bermeo    Acute metabolic encephalopathy 6/14/2024     Resolved by  HUNTER Duarte    Acute respiratory failure with hypoxia and hypercapnia (HCC) 6/18/2024     Resolved by  HUNTER Nagel    Hypernatremia 6/15/2024     Resolved by  HUNTER Bermeo        Discharging Physician / Practitioner: Amol Patel MD  PCP: HUNTER Brown  Admission Date:   Admission Orders (From admission, onward)       Ordered        06/03/24 2250  Inpatient Admission  Once                          Discharge Date: 06/21/24    Consultations During Hospital Stay:  Pulmonary  Interventional radiology    Procedures Performed:   Chest tube insertion on June 9  Chest tube insertion on Ramona 3  Chest tube insertion on June 14    Significant Findings / Test Results:   Chest x-ray with persistent right pneumothorax in the apical region.  Ramona 3 CT head without contrast unremarkable for acute abnormality  Ramona 3 CT chest with contrast showed bilateral lower airspace consolidations compatible with bronchopneumonia and numerous bilateral anterior rib fractures  Video barium swallow on June 5 June 9 CT chest abdomen pelvis showed large right pleural effusion and a moderate size pleural effusion on the left  Repeat CT on June 9 showed new right-sided chest tube with a new right hydropneumothorax  June 11 CT chest showed right-sided chest tube with the tip medially located and a moderate right hydropneumothorax  June 13 CT chest showed right chest tube pulled back with a decrease in the right Sanjay pneumothorax  June 20 chest x-ray showed a small right apical pneumothorax without significant change and no chest tube was placed    Incidental Findings:   none        Test Results Pending at Discharge (will require follow up):   none     Outpatient Tests Requested:  none    Complications: Pneumothorax    Reason for Admission: Postcardiac arrest    Hospital Course:   Jairon Oconnell is a 59 y.o. male patient who originally presented to the hospital on 6/3/2024 due to postcardiac arrest.  The patient has a history of seizure disorder, bipolar, hypertension, hyperlipidemia, hypothyroid who presented status postcardiac arrest after a choking episode.  It was reported a witnessed choking while eating dinner and a bystander initiated Lamoni maneuver unsuccessfully.  Ultimately CPR was initiated in the field where ROSC was obtained.  A chest x-ray revealed a pneumothorax likely secondary to CPR as well as multiple rib fractures and a chest tube was placed.    The patient was admitted to the ICU sedated and intubated.  Postarrest he was following commands and able to be extubated.  Ultimately he had an additional chest tube placed due to pleural effusion with a subsequent right pneumothorax.  An additional chest tube was placed on June 14 which showed improvement of the pneumothorax however on June 19 the chest tube was accidentally removed after being placed on waterseal.  On June 20 the chest x-ray did not reveal worsening pneumothorax and the patient was cleared by pulmonary.    Per facility, the patient appears at his baseline as of their visit on June 19 and he is medically stable for discharge            Please see above list of diagnoses and related plan for additional information.     Condition at Discharge: fair    Discharge Day Visit / Exam:   Subjective:  patient is laying in bed. He has no complaints of chest pain, sob. He has no fever/chills.    Vitals: Blood Pressure: 110/74 (06/21/24 0720)  Pulse: 104 (06/20/24 2215)  Temperature: (!) 97.4 °F (36.3 °C) (06/20/24 2212)  Temp Source: Oral (06/19/24 2207)  Respirations: 17 (06/21/24 0720)  Height: 6' (182.9 cm)  (06/04/24 0911)  Weight - Scale: 80.2 kg (176 lb 12.9 oz) (06/17/24 0600)  SpO2: 95 % (06/20/24 2215)  Exam:   Physical Exam  Vitals and nursing note reviewed.   Constitutional:       Appearance: Normal appearance. He is not ill-appearing or diaphoretic.   HENT:      Head: Normocephalic and atraumatic.      Nose: Nose normal. No congestion.      Mouth/Throat:      Mouth: Mucous membranes are moist.      Pharynx: No oropharyngeal exudate.   Eyes:      General: No scleral icterus.     Conjunctiva/sclera: Conjunctivae normal.   Musculoskeletal:      Cervical back: Normal range of motion and neck supple. No rigidity.   Neurological:      Mental Status: He is alert.      Comments: Moving all four ext              Discharge instructions/Information to patient and family:   See after visit summary for information provided to patient and family.      Provisions for Follow-Up Care:  See after visit summary for information related to follow-up care and any pertinent home health orders.      Mobility at time of Discharge:   Basic Mobility Inpatient Raw Score: 11  -HL Goal: 4: Move to chair/commode  JH-HLM Achieved: 3: Sit at edge of bed  HLM Goal NOT achieved. Continue to encourage mobility in post discharge setting.     Disposition:   Group Home / Personal Care Home at [facility]    Planned Readmission: no     Discharge Statement:  I spent 35 minutes discharging the patient. This time was spent on the day of discharge. I had direct contact with the patient on the day of discharge. Greater than 50% of the total time was spent examining patient, answering all patient questions, arranging and discussing plan of care with patient as well as directly providing post-discharge instructions.  Additional time then spent on discharge activities.    Discharge Medications:  See after visit summary for reconciled discharge medications provided to patient and/or family.      **Please Note: This note may have been constructed using a  voice recognition system**

## 2024-06-21 NOTE — ASSESSMENT & PLAN NOTE
Continue lisinopril 5 mg due to normal blood pressures achieved.  Home PTA dosing 10 mg can be increased if increasing blood pressures while outpatient

## 2024-06-21 NOTE — ASSESSMENT & PLAN NOTE
Appreciate PT OT support  Was to return home to the group setting   SUBJECTIVE:  Nela Castillo is a 78year old male who presents for a consultation at the request of, and a copy of this note will be sent to, Denita Meng [facility], for evaluation of  urinary frquency. He states that the problem is unchanged.  Symptoms include about Left arm, Patient Position: Sitting, Cuff Size: adult)   Pulse 92   Resp 16   Ht 5' 4\" (1.626 m)   Wt 125 lb (56.7 kg)   BMI 21.46 kg/m²   He appears well, in no apparent distress. Alert and oriented times three, pleasant and cooperative.   CARDIOVASCULAR

## 2024-06-21 NOTE — ASSESSMENT & PLAN NOTE
Lab Results   Component Value Date    HGBA1C 6.7 (H) 06/14/2024       Recent Labs     06/20/24  1126 06/20/24  1555 06/20/24 2054 06/21/24  0718   POCGLU 168* 169* 342* 191*     Resume home oral hypoglycemics    Blood Sugar Average: Last 72 hrs:  (P) [id number]

## 2024-06-21 NOTE — CASE MANAGEMENT
Case Management Discharge Planning Note    Patient name Jairon Oconnell  Location /-01 MRN [id number]  : 1965 Date 2024       Current Admission Date: 6/3/2024  Current Admission Diagnosis:Cardiac arrest ([facility])   Patient Active Problem List    Diagnosis Date Noted Date Diagnosed    DM (diabetes mellitus) ([facility]) 2024     Cardiac arrest ([facility]) 2024     Dysphagia 2024     Closed traumatic fracture of ribs of right side with pneumothorax 2024     Urinary retention 2024     Nondisplaced fracture of greater trochanter of right femur, subsequent encounter for closed fracture with routine healing 2024     Closed nondisplaced fracture of proximal phalanx of left index finger with routine healing, subsequent encounter 10/30/2023     Pressure ulcer of sacral region, stage 3 ([facility]) 2023     Ambulatory dysfunction 2022     Social problem 2021     Hypomagnesemia 2021     Thrombocytopathia ([facility]) 2020     Hyponatremia 2019     Metabolic encephalopathy 2019     Seborrheic dermatitis of scalp 2019     Nearsightedness 2019     Behavior concern in adult 2019     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     Vitamin B12 deficiency 2017     HLD (hyperlipidemia) 2016     Vitamin D deficiency 2016     Type 2 diabetes mellitus with diabetic neuropathy, without long-term current use of insulin ([facility]) 06/15/2016     Acquired hypothyroidism 06/15/2016     Cataract 2013     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     Generalized convulsive epilepsy ([facility]) 2013     HTN (hypertension) 2013     Osteoporosis 2013     Scoliosis 2013       LOS (days): 18  Geometric Mean LOS (GMLOS) (days): 5.2  Days to GMLOS:-12.3     OBJECTIVE:  Risk of Unplanned Readmission Score: 29.17         Current admission status: Inpatient   Preferred Pharmacy:   [facility]  [place] - [address]  [address] [place] [address]  Phone: [phone] Fax: [phone]    [id number] [place] [address]  [facility] [place] [address]  Phone: [phone] Fax: [phone]    Primary Care Provider: HUNTER Brown    Primary Insurance: MEDICARE  Secondary Insurance: [place] MEDICAL ASSISTANCE    DISCHARGE DETAILS:  CM spoke with pt caregiver amaris this morning and informed her that pt has a  time of 2PM with [facility]. Caregiver is aware that [facility] will be in touch with her regarding SOC. CM continues to follow and assist with pt dc plans.

## 2024-06-21 NOTE — PLAN OF CARE
Problem: PAIN - ADULT  Goal: Verbalizes/displays adequate comfort level or baseline comfort level  Description: Interventions:  - Encourage patient to monitor pain and request assistance  - Assess pain using appropriate pain scale  - Administer analgesics based on type and severity of pain and evaluate response  - Implement non-pharmacological measures as appropriate and evaluate response  - Consider cultural and social influences on pain and pain management  - Notify physician/advanced practitioner if interventions unsuccessful or patient reports new pain  Outcome: Progressing     Problem: INFECTION - ADULT  Goal: Absence or prevention of progression during hospitalization  Description: INTERVENTIONS:  - Assess and monitor for signs and symptoms of infection  - Monitor lab/diagnostic results  - Monitor all insertion sites, i.e. indwelling lines, tubes, and drains  - Monitor endotracheal if appropriate and nasal secretions for changes in amount and color  - [place] appropriate cooling/warming therapies per order  - Administer medications as ordered  - Instruct and encourage patient and family to use good hand hygiene technique  - Identify and instruct in appropriate isolation precautions for identified infection/condition  Outcome: Progressing     Problem: SAFETY ADULT  Goal: Patient will remain free of falls  Description: INTERVENTIONS:  - Educate patient/family on patient safety including physical limitations  - Instruct patient to call for assistance with activity   - Consult OT/PT to assist with strengthening/mobility   - Keep Call bell within reach  - Keep bed low and locked with side rails adjusted as appropriate  - Keep care items and personal belongings within reach  - Initiate and maintain comfort rounds  - Make Fall Risk Sign visible to staff  - Offer Toileting every 2 Hours, in advance of need  - Initiate/Maintain bed alarm  - Apply yellow socks and bracelet for high fall risk patients  - Consider  moving patient to room near nurses station  Outcome: Progressing  Goal: Maintain or return to baseline ADL function  Description: INTERVENTIONS:  -  Assess patient's ability to carry out ADLs; assess patient's baseline for ADL function and identify physical deficits which impact ability to perform ADLs (bathing, care of mouth/teeth, toileting, grooming, dressing, etc.)  - Assess/evaluate cause of self-care deficits   - Assess range of motion  - Assess patient's mobility; develop plan if impaired  - Assess patient's need for assistive devices and provide as appropriate  - Encourage maximum independence but intervene and supervise when necessary  - Involve family in performance of ADLs  - Assess for home care needs following discharge   - Consider OT consult to assist with ADL evaluation and planning for discharge  - Provide patient education as appropriate  Outcome: Progressing  Goal: Maintains/Returns to pre admission functional level  Description: INTERVENTIONS:  - Perform AM-PAC 6 Click Basic Mobility/ Daily Activity assessment daily.  - Set and communicate daily mobility goal to care team and patient/family/caregiver.   - Collaborate with rehabilitation services on mobility goals if consulted  - Reposition patient every 2 hours.  - Dangle patient 1 times a day  - Stand patient 1 times a day  - Out of bed to chair 1 times a day   - Out of bed for meals 1 times a day  - Out of bed for toileting  - Record patient progress and toleration of activity level   Outcome: Progressing     Problem: DISCHARGE PLANNING  Goal: Discharge to home or other facility with appropriate resources  Description: INTERVENTIONS:  - Identify barriers to discharge w/patient and caregiver  - Arrange for needed discharge resources and transportation as appropriate  - Identify discharge learning needs (meds, wound care, etc.)  - Arrange for interpretive services to assist at discharge as needed  - Refer to Case Management Department for  coordinating discharge planning if the patient needs post-hospital services based on physician/advanced practitioner order or complex needs related to functional status, cognitive ability, or social support system  Outcome: Progressing     Problem: Knowledge Deficit  Goal: Patient/family/caregiver demonstrates understanding of disease process, treatment plan, medications, and discharge instructions  Description: Complete learning assessment and assess knowledge base.  Interventions:  - Provide teaching at level of understanding  - Provide teaching via preferred learning methods  Outcome: Progressing     Problem: Nutrition/Hydration-ADULT  Goal: Nutrient/Hydration intake appropriate for improving, restoring or maintaining nutritional needs  Description: Monitor and assess patient's nutrition/hydration status for malnutrition. Collaborate with interdisciplinary team and initiate plan and interventions as ordered.  Monitor patient's weight and dietary intake as ordered or per policy. Utilize nutrition screening tool and intervene as necessary. Determine patient's food preferences and provide high-protein, high-caloric foods as appropriate.     INTERVENTIONS:  - Monitor oral intake, urinary output, labs, and treatment plans  - Assess nutrition and hydration status and recommend course of action  - Evaluate amount of meals eaten  - Assist patient with eating if necessary   - Allow adequate time for meals  - Recommend/ encourage appropriate diets, oral nutritional supplements, and vitamin/mineral supplements  - Order, calculate, and assess calorie counts as needed  - Recommend, monitor, and adjust tube feedings and TPN/PPN based on assessed needs  - Assess need for intravenous fluids  - Provide specific nutrition/hydration education as appropriate  - Include patient/family/caregiver in decisions related to nutrition  Outcome: Progressing     Problem: Prexisting or High Potential for Compromised Skin Integrity  Goal: Skin  integrity is maintained or improved  Description: INTERVENTIONS:  - Identify patients at risk for skin breakdown  - Assess and monitor skin integrity  - Assess and monitor nutrition and hydration status  - Monitor labs   - Assess for incontinence   - Turn and reposition patient  - Assist with mobility/ambulation  - Relieve pressure over bony prominences  - Avoid friction and shearing  - Provide appropriate hygiene as needed including keeping skin clean and dry  - Evaluate need for skin moisturizer/barrier cream  - Collaborate with interdisciplinary team   - Patient/family teaching  - Consider wound care consult   Outcome: Progressing     Problem: RESPIRATORY - ADULT  Goal: Achieves optimal ventilation and oxygenation  Description: INTERVENTIONS:  - Assess for changes in respiratory status  - Assess for changes in mentation and behavior  - Position to facilitate oxygenation and minimize respiratory effort  - Oxygen administered by appropriate delivery if ordered  - Initiate smoking cessation education as indicated  - Encourage broncho-pulmonary hygiene including cough, deep breathe, Incentive Spirometry  - Assess the need for suctioning and aspirate as needed  - Assess and instruct to report SOB or any respiratory difficulty  - Respiratory Therapy support as indicated  Outcome: Progressing

## 2024-06-21 NOTE — PLAN OF CARE
Problem: PAIN - ADULT  Goal: Verbalizes/displays adequate comfort level or baseline comfort level  Description: Interventions:  - Encourage patient to monitor pain and request assistance  - Assess pain using appropriate pain scale  - Administer analgesics based on type and severity of pain and evaluate response  - Implement non-pharmacological measures as appropriate and evaluate response  - Consider cultural and social influences on pain and pain management  - Notify physician/advanced practitioner if interventions unsuccessful or patient reports new pain  6/21/2024 1444 by Ayala Maldonado  Outcome: Adequate for Discharge  6/21/2024 0948 by Ayala Maldonado  Outcome: Progressing     Problem: INFECTION - ADULT  Goal: Absence or prevention of progression during hospitalization  Description: INTERVENTIONS:  - Assess and monitor for signs and symptoms of infection  - Monitor lab/diagnostic results  - Monitor all insertion sites, i.e. indwelling lines, tubes, and drains  - Monitor endotracheal if appropriate and nasal secretions for changes in amount and color  - [place] appropriate cooling/warming therapies per order  - Administer medications as ordered  - Instruct and encourage patient and family to use good hand hygiene technique  - Identify and instruct in appropriate isolation precautions for identified infection/condition  6/21/2024 1444 by Ayala Maldonado  Outcome: Adequate for Discharge  6/21/2024 0948 by Ayala Maldonado  Outcome: Progressing     Problem: SAFETY ADULT  Goal: Patient will remain free of falls  Description: INTERVENTIONS:  - Educate patient/family on patient safety including physical limitations  - Instruct patient to call for assistance with activity   - Consult OT/PT to assist with strengthening/mobility   - Keep Call bell within reach  - Keep bed low and locked with side rails adjusted as appropriate  - Keep care items and personal belongings within reach  - Initiate and maintain  comfort rounds  - Make Fall Risk Sign visible to staff  - Offer Toileting every 2 Hours, in advance of need  - Initiate/Maintain bed alarm  - Apply yellow socks and bracelet for high fall risk patients  - Consider moving patient to room near nurses station  6/21/2024 1444 by Ayala Maldonado  Outcome: Adequate for Discharge  6/21/2024 0948 by Ayala Maldonado  Outcome: Progressing  Goal: Maintain or return to baseline ADL function  Description: INTERVENTIONS:  -  Assess patient's ability to carry out ADLs; assess patient's baseline for ADL function and identify physical deficits which impact ability to perform ADLs (bathing, care of mouth/teeth, toileting, grooming, dressing, etc.)  - Assess/evaluate cause of self-care deficits   - Assess range of motion  - Assess patient's mobility; develop plan if impaired  - Assess patient's need for assistive devices and provide as appropriate  - Encourage maximum independence but intervene and supervise when necessary  - Involve family in performance of ADLs  - Assess for home care needs following discharge   - Consider OT consult to assist with ADL evaluation and planning for discharge  - Provide patient education as appropriate  6/21/2024 1444 by Ayala Maldonado  Outcome: Adequate for Discharge  6/21/2024 0948 by Ayala Maldonado  Outcome: Progressing  Goal: Maintains/Returns to pre admission functional level  Description: INTERVENTIONS:  - Perform AM-PAC 6 Click Basic Mobility/ Daily Activity assessment daily.  - Set and communicate daily mobility goal to care team and patient/family/caregiver.   - Collaborate with rehabilitation services on mobility goals if consulted  - Reposition patient every 2 hours.  - Dangle patient 1 times a day  - Stand patient 1 times a day  - Out of bed to chair 1 times a day   - Out of bed for meals 1 times a day  - Out of bed for toileting  - Record patient progress and toleration of activity level   6/21/2024 1444 by Ayala  Joel  Outcome: Adequate for Discharge  6/21/2024 0948 by Ayala Maldonado  Outcome: Progressing     Problem: DISCHARGE PLANNING  Goal: Discharge to home or other facility with appropriate resources  Description: INTERVENTIONS:  - Identify barriers to discharge w/patient and caregiver  - Arrange for needed discharge resources and transportation as appropriate  - Identify discharge learning needs (meds, wound care, etc.)  - Arrange for interpretive services to assist at discharge as needed  - Refer to Case Management Department for coordinating discharge planning if the patient needs post-hospital services based on physician/advanced practitioner order or complex needs related to functional status, cognitive ability, or social support system  6/21/2024 1444 by Ayala Maldonado  Outcome: Adequate for Discharge  6/21/2024 0948 by Ayala Maldonado  Outcome: Progressing     Problem: Knowledge Deficit  Goal: Patient/family/caregiver demonstrates understanding of disease process, treatment plan, medications, and discharge instructions  Description: Complete learning assessment and assess knowledge base.  Interventions:  - Provide teaching at level of understanding  - Provide teaching via preferred learning methods  6/21/2024 1444 by Ayala Maldonado  Outcome: Adequate for Discharge  6/21/2024 0948 by Ayala Maldonado  Outcome: Progressing     Problem: Nutrition/Hydration-ADULT  Goal: Nutrient/Hydration intake appropriate for improving, restoring or maintaining nutritional needs  Description: Monitor and assess patient's nutrition/hydration status for malnutrition. Collaborate with interdisciplinary team and initiate plan and interventions as ordered.  Monitor patient's weight and dietary intake as ordered or per policy. Utilize nutrition screening tool and intervene as necessary. Determine patient's food preferences and provide high-protein, high-caloric foods as appropriate.     INTERVENTIONS:  - Monitor oral  intake, urinary output, labs, and treatment plans  - Assess nutrition and hydration status and recommend course of action  - Evaluate amount of meals eaten  - Assist patient with eating if necessary   - Allow adequate time for meals  - Recommend/ encourage appropriate diets, oral nutritional supplements, and vitamin/mineral supplements  - Order, calculate, and assess calorie counts as needed  - Recommend, monitor, and adjust tube feedings and TPN/PPN based on assessed needs  - Assess need for intravenous fluids  - Provide specific nutrition/hydration education as appropriate  - Include patient/family/caregiver in decisions related to nutrition  6/21/2024 1444 by Ayala Maldonado  Outcome: Adequate for Discharge  6/21/2024 0948 by Ayala Maldonado  Outcome: Progressing     Problem: Prexisting or High Potential for Compromised Skin Integrity  Goal: Skin integrity is maintained or improved  Description: INTERVENTIONS:  - Identify patients at risk for skin breakdown  - Assess and monitor skin integrity  - Assess and monitor nutrition and hydration status  - Monitor labs   - Assess for incontinence   - Turn and reposition patient  - Assist with mobility/ambulation  - Relieve pressure over bony prominences  - Avoid friction and shearing  - Provide appropriate hygiene as needed including keeping skin clean and dry  - Evaluate need for skin moisturizer/barrier cream  - Collaborate with interdisciplinary team   - Patient/family teaching  - Consider wound care consult   6/21/2024 1444 by Ayala Maldonado  Outcome: Adequate for Discharge  6/21/2024 0948 by Ayala Maldonado  Outcome: Progressing     Problem: SLP ADULT - SWALLOWING, IMPAIRED  Goal: Advance to least restrictive diet without signs or symptoms of aspiration for planned discharge setting.  See evaluation for individualized goals.  Description: Patient will:    1. Tolerate  with no S/S of  across meals .   2. Demonstrate effective  .  3. Use  .    Outcome:  Adequate for Discharge     Problem: OCCUPATIONAL THERAPY ADULT  Goal: Performs self-care activities at highest level of function for planned discharge setting.  See evaluation for individualized goals.  Description:  Outcome: Adequate for Discharge     Problem: PHYSICAL THERAPY ADULT  Goal: Performs mobility at highest level of function for planned discharge setting.  See evaluation for individualized goals.  Description: Treatment/Interventions: Functional transfer training, LE strengthening/ROM, Therapeutic exercise, Endurance training, Cognitive reorientation, Patient/family training, Bed mobility, Continued evaluation, Spoke to nursing, OT          See flowsheet documentation for full assessment, interventions and recommendations.  Outcome: Adequate for Discharge     Problem: RESPIRATORY - ADULT  Goal: Achieves optimal ventilation and oxygenation  Description: INTERVENTIONS:  - Assess for changes in respiratory status  - Assess for changes in mentation and behavior  - Position to facilitate oxygenation and minimize respiratory effort  - Oxygen administered by appropriate delivery if ordered  - Initiate smoking cessation education as indicated  - Encourage broncho-pulmonary hygiene including cough, deep breathe, Incentive Spirometry  - Assess the need for suctioning and aspirate as needed  - Assess and instruct to report SOB or any respiratory difficulty  - Respiratory Therapy support as indicated  6/21/2024 1444 by Ayala Maldonado  Outcome: Adequate for Discharge  6/21/2024 0948 by Ayala Maldonado  Outcome: Progressing

## 2024-06-24 ENCOUNTER — TRANSITIONAL CARE MANAGEMENT (OUTPATIENT)
Dept: FAMILY MEDICINE CLINIC | Facility: CLINIC | Age: 59
End: 2024-06-24

## 2024-06-24 RX ORDER — FOLIC ACID 1 MG/1
1 TABLET ORAL DAILY
Qty: 30 TABLET | Refills: 0 | Status: SHIPPED | OUTPATIENT
Start: 2024-06-24 | End: 2024-07-24

## 2024-06-24 RX ORDER — POLYETHYLENE GLYCOL 3350 17 G/17G
17 POWDER, FOR SOLUTION ORAL DAILY
Qty: 238 G | Refills: 0 | Status: SHIPPED | OUTPATIENT
Start: 2024-06-24 | End: 2024-07-08

## 2024-06-24 RX ORDER — LANOLIN ALCOHOL/MO/W.PET/CERES
400 CREAM (GRAM) TOPICAL 2 TIMES DAILY
Qty: 60 TABLET | Refills: 0 | Status: SHIPPED | OUTPATIENT
Start: 2024-06-24 | End: 2024-07-24

## 2024-06-24 RX ORDER — ACETAMINOPHEN 325 MG/1
975 TABLET ORAL EVERY 8 HOURS SCHEDULED
Qty: 270 TABLET | Refills: 0 | Status: SHIPPED | OUTPATIENT
Start: 2024-06-24 | End: 2024-07-24

## 2024-06-24 RX ORDER — AMOXICILLIN 250 MG
2 CAPSULE ORAL 2 TIMES DAILY
Qty: 120 TABLET | Refills: 0 | Status: SHIPPED | OUTPATIENT
Start: 2024-06-24 | End: 2024-07-24

## 2024-06-24 NOTE — CASE MANAGEMENT
Case Management Progress Note    Patient name Jairon Oconnell  Location /-01 MRN [id number]  : 1965 Date 2024       LOS (days): 18  Geometric Mean LOS (GMLOS) (days): 5.2  Days to GMLOS:-12.5        OBJECTIVE:        Current admission status: Inpatient  Preferred Pharmacy:   [facility] [place] - [address]  [address] [address]  Phone: [phone] Fax: [phone]    [id number] [place], [place] - [address] [address]  Phone: [phone] Fax: [phone]    Primary Care Provider: HUNTER Brown    Primary Insurance: MEDICARE  Secondary Insurance: [place] [employer]    PROGRESS NOTE:  Call received from pt caregiver Betty informing CM that pt medications were not called into the pharmacy. CM spoke with [facility] who confirmed that pt only received a script for Lisinopril. [facility] provider Maryuri Villalpando made aware and called in pt medications. Pt caregiver Betty made aware.

## 2024-06-24 NOTE — QUICK NOTE
GORDON Ramirez approach provider that group is required to have all medications sent to their pharmacy whether OTC or RX. Confirmed which OTC/rx were needed. Sent the follow OTC meds to patient's/group homes pharmacy    Tylenol, Cyanocobalamin, folic acid, mag-ox, miralax, and senna-docusate sodium

## 2024-06-28 ENCOUNTER — OFFICE VISIT (OUTPATIENT)
Dept: INTERNAL MEDICINE CLINIC | Facility: CLINIC | Age: 59
End: 2024-06-28
Payer: MEDICARE

## 2024-06-28 VITALS
OXYGEN SATURATION: 96 % | HEIGHT: 72 IN | WEIGHT: 169 LBS | SYSTOLIC BLOOD PRESSURE: 124 MMHG | DIASTOLIC BLOOD PRESSURE: 78 MMHG | TEMPERATURE: 97.8 F | BODY MASS INDEX: 22.89 KG/M2 | HEART RATE: 103 BPM

## 2024-06-28 DIAGNOSIS — D69.1 THROMBOCYTOPATHIA (HCC): ICD-10-CM

## 2024-06-28 DIAGNOSIS — E03.9 ACQUIRED HYPOTHYROIDISM: ICD-10-CM

## 2024-06-28 DIAGNOSIS — G20.C PARKINSONIAN TREMOR: ICD-10-CM

## 2024-06-28 DIAGNOSIS — E06.3 HYPOTHYROIDISM DUE TO HASHIMOTO'S THYROIDITIS: ICD-10-CM

## 2024-06-28 DIAGNOSIS — I46.9 CARDIAC ARREST (HCC): Primary | ICD-10-CM

## 2024-06-28 DIAGNOSIS — K59.04 CHRONIC IDIOPATHIC CONSTIPATION: ICD-10-CM

## 2024-06-28 DIAGNOSIS — G40.309 GENERALIZED CONVULSIVE EPILEPSY (HCC): Chronic | ICD-10-CM

## 2024-06-28 DIAGNOSIS — G80.4 ATAXIC CEREBRAL PALSY (HCC): ICD-10-CM

## 2024-06-28 DIAGNOSIS — E03.8 HYPOTHYROIDISM DUE TO HASHIMOTO'S THYROIDITIS: ICD-10-CM

## 2024-06-28 DIAGNOSIS — R63.8 INCREASED NUTRITIONAL NEEDS: ICD-10-CM

## 2024-06-28 DIAGNOSIS — E11.40 TYPE 2 DIABETES MELLITUS WITH DIABETIC NEUROPATHY, WITHOUT LONG-TERM CURRENT USE OF INSULIN (HCC): ICD-10-CM

## 2024-06-28 DIAGNOSIS — S27.0XXA TRAUMATIC PNEUMOTHORAX, INITIAL ENCOUNTER: ICD-10-CM

## 2024-06-28 DIAGNOSIS — F69 BEHAVIOR CONCERN IN ADULT: ICD-10-CM

## 2024-06-28 PROBLEM — Z87.09 HISTORY OF PNEUMOTHORAX: Status: ACTIVE | Noted: 2024-06-28

## 2024-06-28 PROCEDURE — 99495 TRANSJ CARE MGMT MOD F2F 14D: CPT | Performed by: INTERNAL MEDICINE

## 2024-06-28 RX ORDER — BENZTROPINE MESYLATE 0.5 MG/1
0.5 TABLET ORAL DAILY
Qty: 90 TABLET | Refills: 1 | Status: SHIPPED | OUTPATIENT
Start: 2024-06-28 | End: 2024-12-25

## 2024-06-28 RX ORDER — LACTOSE-REDUCED FOOD
1 POWDER (GRAM) ORAL DAILY
Qty: 990 ML | Refills: 5 | Status: SHIPPED | OUTPATIENT
Start: 2024-06-28 | End: 2024-12-25

## 2024-06-28 RX ORDER — LEVOTHYROXINE SODIUM 0.15 MG/1
150 TABLET ORAL DAILY
Qty: 90 TABLET | Refills: 3 | Status: SHIPPED | OUTPATIENT
Start: 2024-06-28

## 2024-06-28 NOTE — ASSESSMENT & PLAN NOTE
Thrombocytosis most likely secondary to inflammatory process from resuscitation and chest tube placement  Follow up with NOE Jenkins in the next several weeks

## 2024-06-28 NOTE — ASSESSMENT & PLAN NOTE
TSH is noted to be elevated t4 suppressed.    Family wanted to hold on changes to the dosing of levothyroxine  Follow up TSH ordered

## 2024-06-28 NOTE — ASSESSMENT & PLAN NOTE
Resloved and the patient is doing okay at this time  Puree foods and honey thickened liquids started per speech path

## 2024-06-28 NOTE — ASSESSMENT & PLAN NOTE
Lab Results   Component Value Date    HGBA1C 6.7 (H) 06/14/2024   A1c okay and no change in medications

## 2024-06-28 NOTE — PROGRESS NOTES
Transition of Care Visit  Name: Jairon Oconnell      : 1965      MRN: [id number]  Encounter Provider: Calvin Yost DO  Encounter Date: 2024   Encounter department: [facility]    Assessment & Plan   1. Cardiac arrest (HCC)  Assessment & Plan:  Resloved and the patient is doing okay at this time  Puree foods and honey thickened liquids started per speech path  2. Traumatic pneumothorax, initial encounter  -     XR chest pa & lateral; Future; Expected date: 2024  3. Behavior concern in adult  Assessment & Plan:  Decreased 800 mg dose of Seroquel to 400 mg QHS and we will continue to follow  4. Ataxic cerebral palsy (HCC)  Assessment & Plan:  Concern regarding aspiration  Continue changes in diet     5. Acquired hypothyroidism  Assessment & Plan:  TSH is noted to be elevated t4 suppressed.    Family wanted to hold on changes to the dosing of levothyroxine  Follow up TSH ordered  Orders:  -     TSH, 3rd generation; Future  6. Generalized convulsive epilepsy (HCC)  Assessment & Plan:  Continue the current medications as noted  7. Chronic idiopathic constipation  -     XR abdomen 1 view kub; Future; Expected date: 2024  8. Parkinsonian tremor  -     benztropine (COGENTIN) 0.5 mg tablet; Take 1 tablet (0.5 mg total) by mouth daily  9. Increased nutritional needs  -     Nutritional Supplements (Ensure Max Protein) LIQD; Take 1 Bottle by mouth in the morning  10. Thrombocytopathia ([facility])  Assessment & Plan:  Thrombocytosis most likely secondary to inflammatory process from resuscitation and chest tube placement  Follow up with NOE Jenkins in the next several weeks  11. Type 2 diabetes mellitus with diabetic neuropathy, without long-term current use of insulin ([facility])  Assessment & Plan:    Lab Results   Component Value Date    HGBA1C 6.7 (H) 2024   A1c okay and no change in medications  12. Hypothyroidism due to Hashimoto's thyroiditis  -     levothyroxine 150 mcg tablet; Take 1  tablet (150 mcg total) by mouth daily         History of Present Illness     Transitional Care Management Review:   Jairon Oconnell is a 59 y.o. male here for TCM follow up.     During the TCM phone call patient stated:  TCM Call     Date and time call was made  6/24/2024  8:45 AM    Hospital care reviewed  Records reviewed    Patient was hospitialized at  [facility]    Date of Admission  06/03/24    Date of discharge  06/21/24    Diagnosis  Cardiac arrest    Disposition  Home    Were the patients medications reviewed and updated  Yes    Current Symptoms  None    Fatigue severity  Mild      TCM Call     Post hospital issues  None    Should patient be enrolled in anticoag monitoring?  No    Scheduled for follow up?  Yes    Did you obtain your prescribed medications  Yes    Do you need help managing your prescriptions or medications  Yes    Is transportation to your appointment needed  Yes    I have advised the patient to call PCP with any new or worsening symptoms  SYDNIE Hernandez    Living Arrangements  Family members    Support System  Home care staff    Are you recieving any outpatient services  No    Are you recieving home care services  No    Types of home care services  Nurse visit    Are you using any community resources  Yes    Which resources are you recieving  WAIVER    Current waiver services  Yes    What services are you recieving  CAREGIVER    Have you fallen in the last 12 months  No    Interperter language line needed  No    Counseling  Caregiver    Comments  Will reach out to Pt caregiver to schedule TCM        The patient was noted to have aspirated while at home.  The patient subsequently choked and lost consciousness and several unsuccessful attempts of the heimlich maneuver to clear the airway were performed.   Resuscitative attempts were made by family and he was taken to the ER where he was resuscitated finally.  Secondary to resuscitative attempts the patient was noted to have a right sided  pneumothorax.  He was intubated and a chest tube was placed.  Chest tube was removed after pneumothorax resolved and the patient notes some pain at the sight.  He subsequently had a swallow study and it was noted that the patient needed honey thickened liquid and pureed food.  Noted that the patient was taking Seroquel 800 mg total daily and discussed decrease of dose.      Review of Systems   Constitutional:  Negative for chills, fatigue and fever.   HENT: Negative.     Respiratory:  Negative for cough, chest tightness and shortness of breath.    Cardiovascular:  Positive for chest pain. Negative for palpitations.   Gastrointestinal:  Negative for abdominal pain, constipation, diarrhea, nausea and vomiting.   Genitourinary: Negative.    Musculoskeletal:  Negative for back pain and myalgias.   Skin: Negative.    Neurological: Negative.    Psychiatric/Behavioral:  Negative for dysphoric mood. The patient is not nervous/anxious.      Objective     /78 (BP Location: Left arm, Patient Position: Sitting, Cuff Size: Adult)   Pulse 103   Temp 97.8 °F (36.6 °C) (Tympanic)   Ht 6' (1.829 m)   Wt 76.7 kg (169 lb)   SpO2 96%   BMI 22.92 kg/m²     Physical Exam  Vitals and nursing note reviewed.   Constitutional:       General: He is not in acute distress.     Appearance: He is well-developed.   HENT:      Head: Normocephalic and atraumatic.   Eyes:      Conjunctiva/sclera: Conjunctivae normal.   Cardiovascular:      Rate and Rhythm: Normal rate and regular rhythm.      Heart sounds: No murmur heard.  Pulmonary:      Effort: Pulmonary effort is normal. No respiratory distress.      Breath sounds: Normal breath sounds.   Chest:       Abdominal:      Tenderness: There is no abdominal tenderness.       Musculoskeletal:         General: No swelling.      Cervical back: Neck supple.   Skin:     General: Skin is warm and dry.      Capillary Refill: Capillary refill takes less than 2 seconds.   Neurological:      Mental  Status: He is alert.   Psychiatric:         Mood and Affect: Mood normal.       Medications have been reviewed by provider in current encounter    Administrative Statements

## 2024-06-28 NOTE — LETTER
6/28/2024    To whom it may concern:      The patient must honey thickened liquids and a pureed diet.      Regards      Dr Yost

## 2024-07-10 ENCOUNTER — HOSPITAL ENCOUNTER (OUTPATIENT)
Facility: HOSPITAL | Age: 59
Setting detail: OBSERVATION
Discharge: HOME | End: 2024-07-10
Attending: EMERGENCY MEDICINE | Admitting: STUDENT IN AN ORGANIZED HEALTH CARE EDUCATION/TRAINING PROGRAM
Payer: MEDICARE

## 2024-07-10 ENCOUNTER — TELEPHONE (OUTPATIENT)
Age: 59
End: 2024-07-10

## 2024-07-10 ENCOUNTER — APPOINTMENT (EMERGENCY)
Dept: CT IMAGING | Facility: HOSPITAL | Age: 59
End: 2024-07-10
Payer: MEDICARE

## 2024-07-10 ENCOUNTER — APPOINTMENT (EMERGENCY)
Dept: RADIOLOGY | Facility: HOSPITAL | Age: 59
End: 2024-07-10
Payer: MEDICARE

## 2024-07-10 VITALS
OXYGEN SATURATION: 95 % | SYSTOLIC BLOOD PRESSURE: 146 MMHG | RESPIRATION RATE: 16 BRPM | TEMPERATURE: 97.7 F | HEART RATE: 92 BPM | DIASTOLIC BLOOD PRESSURE: 75 MMHG

## 2024-07-10 DIAGNOSIS — K59.00 CONSTIPATION: Primary | ICD-10-CM

## 2024-07-10 DIAGNOSIS — T18.9XXA SWALLOWED FOREIGN BODY, INITIAL ENCOUNTER: ICD-10-CM

## 2024-07-10 PROBLEM — Z86.74 HISTORY OF CARDIAC ARREST: Status: ACTIVE | Noted: 2024-06-03

## 2024-07-10 PROCEDURE — 99284 EMERGENCY DEPT VISIT MOD MDM: CPT

## 2024-07-10 PROCEDURE — 74176 CT ABD & PELVIS W/O CONTRAST: CPT

## 2024-07-10 PROCEDURE — 99285 EMERGENCY DEPT VISIT HI MDM: CPT | Performed by: EMERGENCY MEDICINE

## 2024-07-10 PROCEDURE — 74018 RADEX ABDOMEN 1 VIEW: CPT

## 2024-07-10 RX ORDER — SODIUM CHLORIDE 9 MG/ML
150 INJECTION, SOLUTION INTRAVENOUS CONTINUOUS
Status: DISCONTINUED | OUTPATIENT
Start: 2024-07-10 | End: 2024-07-10 | Stop reason: HOSPADM

## 2024-07-10 NOTE — ED PROVIDER NOTES
History  Chief Complaint   Patient presents with    Constipation     C/o of constipation and abdominal pain. Last BM x1 week ago. Recent changes in Seroquel per caregiver.      This patient has mental retardation and chronic constipation.  He does have a history of pica.  Comes in with 5 days without a bowel movement and some abdominal distention.  The abdomen is completely soft and depressible.  No n/v. Rectal exam shows no impaction.  I did initial x-ray that shows a lot of constipation but also a fairly localized gas pattern to the right upper quadrant.  There is some hyperdense foreign bodies in the right mid quadrant.  I suspected they might be metallic so I did a CT scan.  The CT scan does show these metallic foreign bodies in the right mid abdomen.      History provided by:  Caregiver   used: No    Constipation  Associated symptoms: no abdominal pain, no back pain, no dysuria, no fever, no nausea and no vomiting        Prior to Admission Medications   Prescriptions Last Dose Informant Patient Reported? Taking?   Blood Glucose Monitoring Suppl (ONETOUCH VERIO) w/Device KIT  Care Giver No No   Sig: by Does not apply route 3 (three) times a day TEST BLOOD SUGARS THREE TIMES   Patient not taking: Reported on 7/7/2023   Blood Glucose Monitoring Suppl (OneTouch Verio Reflect) w/Device KIT   No No   Sig: Check blood sugars once daily. Please substitute with appropriate alternative as covered by patient's insurance. Dx: E11.65   Depakote 500 MG DR tablet   No No   Sig: Take 1 tab in the morning and 2 tabs at night. Brand necessary   Disposable Gloves (Safe-Sense Glove-Blk-Nitrl-L) MISC  Care Giver No No   Sig: Use 1 each 3 (three) times a day as needed (care taker assisting with soiled briefs)   Patient not taking: Reported on 9/28/2023   Incontinence Supply Disposable (Briefs Overnight Medium) MISC  Care Giver No No   Sig: Use in the morning   Patient not taking: Reported on 9/28/2023   Multiple  Vitamin (Daily-Reina) TABS  Care Giver No No   Sig: Take 1 tablet by mouth daily Take at 8 AM   Nutritional Supplements (Ensure Max Protein) LIQD   No No   Sig: Take 1 Bottle by mouth in the morning   OneTouch Delica Lancets 33G MISC  Care Giver No No   Sig: by Does not apply route daily TEST BLOOD SUGARS THREE TIMES DAILY   Patient not taking: Reported on 1/24/2024   OneTouch Delica Lancets 33G MISC   No No   Sig: Check blood sugars once daily. Please substitute with appropriate alternative as covered by patient's insurance. Dx: E11.65   QUEtiapine (SEROquel XR) 400 mg 24 hr tablet   No No   Sig: Take 1 tablet (400 mg total) by mouth daily at bedtime   acetaminophen (TYLENOL) 325 mg tablet   No No   Sig: Take 3 tablets (975 mg total) by mouth every 8 (eight) hours   benztropine (COGENTIN) 0.5 mg tablet   No No   Sig: Take 1 tablet (0.5 mg total) by mouth daily   cyanocobalamin (VITAMIN B-12) 500 MCG tablet   No No   Sig: Take 1 tablet (500 mcg total) by mouth daily   docusate sodium (COLACE) 100 mg capsule   No No   Sig: Take 1 capsule (100 mg total) by mouth 2 (two) times a day   folic acid (FOLVITE) 1 mg tablet   No No   Sig: Take 1 tablet (1 mg total) by mouth daily   gabapentin (NEURONTIN) 400 mg capsule  Care Giver No No   Sig: Take 1 capsule (400 mg total) by mouth 3 (three) times a day   glucose blood (OneTouch Verio) test strip   No No   Sig: Check blood sugars once daily. Please substitute with appropriate alternative as covered by patient's insurance. Dx: E11.65   lacosamide (VIMPAT) 150 mg tablet   No No   Sig: Take 1 tab (150 mg) in the morning.   lacosamide (VIMPAT) 200 mg tablet   No No   Sig: Take 1 tab (200 mg) at night.   levETIRAcetam (KEPPRA) 750 mg tablet   No No   Sig: Give 2 tabs (1500 mg) by mouth twice a day   levothyroxine 150 mcg tablet   No No   Sig: Take 1 tablet (150 mcg total) by mouth daily   lisinopril (ZESTRIL) 5 mg tablet   No No   Sig: Take 1 tablet (5 mg total) by mouth daily    loratadine (CLARITIN) 10 mg tablet  Care Giver No No   Sig: Take 1 tablet (10 mg total) by mouth daily   magnesium Oxide (MAG-OX) 400 mg TABS   No No   Sig: Take 1 tablet (400 mg total) by mouth 2 (two) times a day   metFORMIN (GLUCOPHAGE) 1000 MG tablet   No No   Sig: GIVE 1 TABLET BY MOUTH TWICE DAILY WITH MEALS FOR DIABETES   polyethylene glycol (MIRALAX) 17 g packet   No No   Sig: Take 17 g by mouth daily for 14 days   senna-docusate sodium (SENOKOT S) 8.6-50 mg per tablet   No No   Sig: Take 2 tablets by mouth 2 (two) times a day   simvastatin (ZOCOR) 40 mg tablet  Care Giver No No   Sig: GIVE 1 TABLET BY MOUTH AT BEDTIME FOR CHOLESTEROL   temazepam (RESTORIL) 30 mg capsule  Care Giver No No   Sig: Take 1 capsule (30 mg total) by mouth daily at bedtime for 10 days   Patient taking differently: Take 15 mg by mouth daily at bedtime as needed for sleep   zonisamide (ZONEGRAN) 100 mg capsule   No No   Sig: Take 3 capsules (300 mg total) by mouth daily at bedtime      Facility-Administered Medications: None       Past Medical History:   Diagnosis Date    ADRIANA (acute kidney injury) ([facility]) 9/24/2019    Bacteremia due to Gram-positive bacteria 9/7/2021    Buttock wound, left, subsequent encounter 11/5/2021    COVID-19     CP (cerebral palsy) ([facility])     Depression     Diabetes ([facility])     Disease of thyroid gland     Gait disorder     Gluteal abscess 9/6/2021    Hyperlipidemia     Hypertension     Kidney failure     Kidney stones     Moderate protein-calorie malnutrition ([facility]) 12/22/2021    Osteoporosis     Pressure injury of left buttock, stage 4 ([facility]) 3/9/2022    Psychiatric disorder     Scoliosis     Seizures ([facility])     Sepsis ([facility]) 9/25/2019    Thyroid disease     UTI (urinary tract infection)        Past Surgical History:   Procedure Laterality Date    APPENDECTOMY      IR CHEST TUBE PLACEMENT  6/14/2024    IR PICC PLACEMENT SINGLE LUMEN  9/13/2021    IR PICC PLACEMENT SINGLE LUMEN  9/16/2021       History reviewed. No  pertinent family history.  I have reviewed and agree with the history as documented.    E-Cigarette/Vaping    E-Cigarette Use Never User      E-Cigarette/Vaping Substances    Nicotine No     THC No     CBD No     Flavoring No     Other No     Unknown No      Social History     Tobacco Use    Smoking status: Never     Passive exposure: Never    Smokeless tobacco: Never   Vaping Use    Vaping status: Never Used   Substance Use Topics    Alcohol use: Never    Drug use: No       Review of Systems   Constitutional:  Negative for chills and fever.   HENT:  Negative for ear pain and sore throat.    Eyes:  Negative for pain and visual disturbance.   Respiratory:  Negative for cough and shortness of breath.    Cardiovascular:  Negative for chest pain and palpitations.   Gastrointestinal:  Positive for constipation. Negative for abdominal pain, nausea and vomiting.   Genitourinary:  Negative for dysuria and hematuria.   Musculoskeletal:  Negative for arthralgias and back pain.   Skin:  Negative for color change and rash.   Neurological:  Negative for seizures and syncope.   All other systems reviewed and are negative.      Physical Exam  Physical Exam  Vitals reviewed.   Constitutional:       General: He is not in acute distress.     Appearance: Normal appearance. He is not ill-appearing.   HENT:      Head: Normocephalic and atraumatic.      Right Ear: External ear normal.      Left Ear: External ear normal.      Mouth/Throat:      Mouth: Mucous membranes are moist.   Eyes:      Pupils: Pupils are equal, round, and reactive to light.   Cardiovascular:      Rate and Rhythm: Normal rate.      Pulses: Normal pulses.   Pulmonary:      Effort: Pulmonary effort is normal.   Abdominal:      General: There is distension.      Palpations: There is no mass.      Tenderness: There is no abdominal tenderness. There is no guarding or rebound.      Hernia: No hernia is present.      Comments: Rectal NEGATIVE for impaction   Skin:      General: Skin is warm and dry.   Neurological:      General: No focal deficit present.      Mental Status: He is alert and oriented to person, place, and time.         Vital Signs  ED Triage Vitals [07/10/24 1149]   Temperature Pulse Respirations Blood Pressure SpO2   97.7 °F (36.5 °C) 105 18 109/69 97 %      Temp Source Heart Rate Source Patient Position - Orthostatic VS BP Location FiO2 (%)   Oral Monitor Sitting Left arm --      Pain Score       --           Vitals:    07/10/24 1149 07/10/24 1357   BP: 109/69 142/74   Pulse: 105 98   Patient Position - Orthostatic VS: Sitting Lying         Visual Acuity      ED Medications  Medications   sodium chloride 0.9 % infusion (has no administration in time range)       Diagnostic Studies  Results Reviewed       Procedure Component Value Units Date/Time    CBC and differential [[id number]]     Lab Status: No result Specimen: Blood     Comprehensive metabolic panel [[id number]]     Lab Status: No result Specimen: Blood                    XR abdomen 1 view kub    (Results Pending)   CT abdomen pelvis wo contrast    (Results Pending)              Procedures  Procedures         ED Course                                 SBIRT 22yo+      Flowsheet Row Most Recent Value   Initial Alcohol Screen: US AUDIT-C     1. How often do you have a drink containing alcohol? 0 Filed at: 07/10/2024 1153   2. How many drinks containing alcohol do you have on a typical day you are drinking?  0 Filed at: 07/10/2024 1153   3a. Male UNDER 65: How often do you have five or more drinks on one occasion? 0 Filed at: 07/10/2024 1153   Audit-C Score 0 Filed at: 07/10/2024 1153   ADRIAN: How many times in the past year have you...    Used an illegal drug or used a prescription medication for non-medical reasons? Never Filed at: 07/10/2024 1153                      Medical Decision Making  Consulted with GI - admit for obs    Patient has presented to the Emergency Department with exacerbation of chronic  condition / pt with new illness-injury that may poses a threat to life or body function.  At least 3 different tests have been ordered on this patient AND reviewed the results.   Old laboratory data (of at least 2 tests) were reviewed from the medical records and compared to today's results  Discussion with patient and/or family members of results (normal and abnormal) and the implications for immediate and long term treatment/management.  Due to the high risk of morbidity further diagnostic testing and treatment is necessary.    3:05 PM  I discussed the case with the hospitalist. We reviewed the HPI, pertinent PMH, ED course and management.   Hospitalist agreed with plan and will admit the patient to the hospital.    Medications  sodium chloride 0.9 % infusion (has no administration in time range)            Amount and/or Complexity of Data Reviewed  Radiology: ordered.                 Disposition  Final diagnoses:   Constipation   Swallowed foreign body, initial encounter     Time reflects when diagnosis was documented in both MDM as applicable and the Disposition within this note       Time User Action Codes Description Comment    7/10/2024  3:05 PM Randy Sharma [K59.00] Constipation     7/10/2024  3:06 PM Randy Sharma [T18.9XXA] Swallowed foreign body, initial encounter           ED Disposition       ED Disposition   Admit    Condition   Stable    Date/Time   Wed Jul 10, 2024 1505    Comment   Case was discussed with HOspitalist and the patient's admission status was agreed to be Admission Status: observation status to the service of Dr. Juarez .               Follow-up Information    None         Patient's Medications   Discharge Prescriptions    No medications on file       No discharge procedures on file.    PDMP Review         Value Time User    PDMP Reviewed  Yes 9/1/2023  2:41 PM HUNTER Hopson            ED Provider  Electronically Signed by             Randy Sharma MD  07/10/24  1508

## 2024-07-10 NOTE — DISCHARGE INSTRUCTIONS
Come back tomorrow before 10 am. We will need to repeat xrays of the abdomen and re-evaluate how he is doing.    A  personal message from Dr. Randy Sharma,  Thank you so much for allowing me to care for you today.    I pride myself in the care and attention I give all my patients.  I hope you were a witness to this tonight.   If for any reason your condition does not improve or worsens, or you have a question that was not answered during your visit you can feel free to text me on my personal phone #  # [phone].   I will answer to your message and continue your care past your emergency room visit.   Please give stool softeners  And give one or 2 enemas     Please understand that although you are being discharged because your condition has been deemed stable and able to be managed on an outpatient setting. However your condition may worsen as part of the natural progression of the illness/condition, if this occurs please come back to the emergency department for a repeat evaluation.

## 2024-07-10 NOTE — ASSESSMENT & PLAN NOTE
Being with 5-day history of constipation, positive flatus.  Abdomen soft, nontender.  X-ray and CT scan reviewed by GI team, official read is pending but no acute signs of obstruction noted.  Does noted to have metallic objects, suspected ingestion given history of pica and behavioral disturbances.  Follow-up on official read CT  GI consulted, patient recommendations.  Follow-up repeat imaging to monitor manage rule out obstruction  Intake and output  Aggressive bowel regimen in place

## 2024-07-10 NOTE — ASSESSMENT & PLAN NOTE
"Lab Results   Component Value Date    HGBA1C 6.7 (H) 06/14/2024       No results for input(s): \"POCGLU\" in the last 72 hours.    Blood Sugar Average: Last 72 hrs:    Hold oral hypoglycemic agents  Continue with sliding scale insulin plus point-of-care glucose checks  "

## 2024-07-10 NOTE — ED NOTES
Pt. Was able to move his bowels, soft, medium amount. Caregiver verbalized that they would rather be home as per sisters advised. MD aware.     Hannah Ramey, RN  07/10/24 5560

## 2024-07-10 NOTE — ASSESSMENT & PLAN NOTE
History of cardiac arrest 6/2024, suspected secondary to hypoxia and choking episode.  Now on dysphagia diet.  Course complicated by pneumothorax requiring multiple chest tubes.  Discharged on 6/24/2024.  Aspiration precautions  Dysphagia diet

## 2024-07-10 NOTE — Clinical Note
Case was discussed with HOspitalist and the patient's admission status was agreed to be Admission Status: observation status to the service of Dr. Juarez .

## 2024-07-10 NOTE — TELEPHONE ENCOUNTER
Patients caregiver, Betty, called and said Jairon is having constipation with abdominal pain.  She has given him the Senokot yesterday AM and PM and this morning along with PRN Miralax but has not had a bowel movement. She has not been able to take him for the xray ordered by Dr. Estes due to his behavior issues.  She asking how long does it take after taking the Senokot to have a bowl movement.  She's asking that she receive a phone call on what to do next.

## 2024-07-12 ENCOUNTER — TELEPHONE (OUTPATIENT)
Age: 59
End: 2024-07-12

## 2024-07-12 DIAGNOSIS — F50.89 PICA: Primary | ICD-10-CM

## 2024-07-12 DIAGNOSIS — K59.09 OTHER CONSTIPATION: ICD-10-CM

## 2024-07-12 DIAGNOSIS — T18.9XXD FB GI (FOREIGN BODY IN GASTROINTESTINAL TRACT), SUBSEQUENT ENCOUNTER: ICD-10-CM

## 2024-07-12 NOTE — TELEPHONE ENCOUNTER
Betty from [employer] called back stating she needs a letter from Bill Jenkins stating the patient needs OTC Sun Screen. Can be emailed to [email or web address]  [phone] phone #

## 2024-07-12 NOTE — TELEPHONE ENCOUNTER
Patient's caregiver, Btety called. She would like to know if there is a imaging facility that she could take Jairon to that would be quick and more of an office setting.     Since being in the ER the patient has been having increased behavior issues and not sleeping well. She stated at he ER he was yelling and frustrated.    She is asking for a suggestion that may work better for him to have his imaging. She would like a call back to discuss.      Please advise, thank you.

## 2024-07-17 ENCOUNTER — HOSPITAL ENCOUNTER (OUTPATIENT)
Dept: CT IMAGING | Facility: HOSPITAL | Age: 59
Discharge: HOME | End: 2024-07-17
Payer: MEDICARE

## 2024-07-17 DIAGNOSIS — T18.9XXD FB GI (FOREIGN BODY IN GASTROINTESTINAL TRACT), SUBSEQUENT ENCOUNTER: ICD-10-CM

## 2024-07-17 DIAGNOSIS — K59.09 OTHER CONSTIPATION: ICD-10-CM

## 2024-07-17 DIAGNOSIS — F50.89 PICA: ICD-10-CM

## 2024-07-17 PROCEDURE — 74176 CT ABD & PELVIS W/O CONTRAST: CPT

## 2024-07-19 NOTE — RESULT ENCOUNTER NOTE
Please call the patient regarding his CTABD result.    NO acute intra-abdominal processes, stable renal stones on left and no hydronephrosis.    Normal caliber bowels - no foreign objects reported    Stable small right pleural effusion with pleural thickening.

## 2024-08-01 ENCOUNTER — OFFICE VISIT (OUTPATIENT)
Dept: FAMILY MEDICINE CLINIC | Facility: CLINIC | Age: 59
End: 2024-08-01
Payer: MEDICARE

## 2024-08-01 VITALS
HEIGHT: 72 IN | OXYGEN SATURATION: 95 % | BODY MASS INDEX: 22.67 KG/M2 | SYSTOLIC BLOOD PRESSURE: 138 MMHG | WEIGHT: 167.4 LBS | HEART RATE: 101 BPM | DIASTOLIC BLOOD PRESSURE: 80 MMHG | TEMPERATURE: 97.8 F

## 2024-08-01 DIAGNOSIS — F69 BEHAVIOR CONCERN IN ADULT: ICD-10-CM

## 2024-08-01 DIAGNOSIS — R33.9 URINARY RETENTION: ICD-10-CM

## 2024-08-01 DIAGNOSIS — E08.00 DIABETES MELLITUS DUE TO UNDERLYING CONDITION WITH HYPEROSMOLARITY WITHOUT COMA, WITHOUT LONG-TERM CURRENT USE OF INSULIN (HCC): ICD-10-CM

## 2024-08-01 DIAGNOSIS — E11.9 DIABETES MELLITUS WITHOUT COMPLICATION (HCC): ICD-10-CM

## 2024-08-01 DIAGNOSIS — E11.40 TYPE 2 DIABETES MELLITUS WITH DIABETIC NEUROPATHY, WITHOUT LONG-TERM CURRENT USE OF INSULIN (HCC): Primary | ICD-10-CM

## 2024-08-01 DIAGNOSIS — G93.41 METABOLIC ENCEPHALOPATHY: ICD-10-CM

## 2024-08-01 DIAGNOSIS — N32.89 BLADDER DISTENTION: ICD-10-CM

## 2024-08-01 PROCEDURE — 99214 OFFICE O/P EST MOD 30 MIN: CPT | Performed by: NURSE PRACTITIONER

## 2024-08-01 PROCEDURE — G2211 COMPLEX E/M VISIT ADD ON: HCPCS | Performed by: NURSE PRACTITIONER

## 2024-08-01 RX ORDER — QUETIAPINE FUMARATE 200 MG/1
200 TABLET, FILM COATED ORAL
Start: 2024-08-01

## 2024-08-01 RX ORDER — FOLIC ACID 1 MG/1
1 TABLET ORAL DAILY
Qty: 30 TABLET | Refills: 0 | Status: SHIPPED | OUTPATIENT
Start: 2024-08-01 | End: 2024-08-31

## 2024-08-01 RX ORDER — LANCETS 33 GAUGE
EACH MISCELLANEOUS
Qty: 200 EACH | Refills: 3 | Status: SHIPPED | OUTPATIENT
Start: 2024-08-01

## 2024-08-01 RX ORDER — BLOOD-GLUCOSE METER
KIT MISCELLANEOUS
Qty: 1 KIT | Refills: 0 | Status: SHIPPED | OUTPATIENT
Start: 2024-08-01

## 2024-08-01 RX ORDER — BLOOD SUGAR DIAGNOSTIC
STRIP MISCELLANEOUS
Qty: 200 EACH | Refills: 3 | Status: SHIPPED | OUTPATIENT
Start: 2024-08-01

## 2024-08-01 NOTE — PROGRESS NOTES
Assessment/Plan:    Problem List Items Addressed This Visit     Type 2 diabetes mellitus with diabetic neuropathy, without long-term current use of insulin ([facility]) - Primary    Relevant Medications    Blood Glucose Monitoring Suppl (OneTouch Verio Reflect) w/Device KIT    glucose blood (OneTouch Verio) test strip    OneTouch Delica Lancets 33G MISC    Other Relevant Orders    Albumin / creatinine urine ratio    Behavior concern in adult    Relevant Medications    QUEtiapine (SEROquel) 200 mg tablet    Metabolic encephalopathy    Relevant Medications    folic acid (FOLVITE) 1 mg tablet    Urinary retention    Relevant Orders    Ambulatory Referral to Urology    UA w Reflex to Microscopic w Reflex to Culture    DM (diabetes mellitus) ([facility])    Relevant Orders    Albumin / creatinine urine ratio   Other Visit Diagnoses     Diabetes mellitus without complication ([facility])        Bladder distention        Relevant Orders    Ambulatory Referral to Urology    UA w Reflex to Microscopic w Reflex to Culture           Diagnoses and all orders for this visit:    Type 2 diabetes mellitus with diabetic neuropathy, without long-term current use of insulin ([facility])  -     Blood Glucose Monitoring Suppl (OneTouch Verio Reflect) w/Device KIT; Check blood sugars twice daily. Please substitute with appropriate alternative as covered by patient's insurance. Dx: E11.65  -     glucose blood (OneTouch Verio) test strip; Check blood sugars twice daily. Please substitute with appropriate alternative as covered by patient's insurance. Dx: E11.65  -     OneTouch Delica Lancets 33G MISC; Check blood sugars twice daily. Please substitute with appropriate alternative as covered by patient's insurance. Dx: E11.65  -     Albumin / creatinine urine ratio; Future    Behavior concern in adult  -     QUEtiapine (SEROquel) 200 mg tablet; Take 1 tablet (200 mg total) by mouth 2 (two) times a day before breakfast and lunch    Metabolic encephalopathy  -     folic  acid (FOLVITE) 1 mg tablet; Take 1 tablet (1 mg total) by mouth daily    Diabetes mellitus without complication (HCC)    Diabetes mellitus due to underlying condition with hyperosmolarity without coma, without long-term current use of insulin (HCC)    Bladder distention  -     Cancel: POCT urine dip  -     Ambulatory Referral to Urology; Future  -     UA w Reflex to Microscopic w Reflex to Culture; Future    Urinary retention  -     Ambulatory Referral to Urology; Future  -     UA w Reflex to Microscopic w Reflex to Culture; Future        No problem-specific Assessment & Plan notes found for this encounter.      Subjective:      Patient ID: Jairon Oconnell is a 59 y.o. male.    Jairon Oconnell present for TCM visit s/p hospitalization for constipation and swallowed foreign body.  Patient does have a chronic history of constipation and a history of PCOS.  He had presented to the ED with 5 days without a bowel movement and abdominal distention.  Caretakers reported no vomiting.  An x-ray was done which showed a large stool burden along with a fair amount of gas pattern in the right upper quadrant.  He also noted some hyperdense foreign bodies in the right mid quadrant.  I suspect is a metallic object they did a CAT scan which confirmed metallic foreign bodies in the right mid abdomen.  A follow-up CAT scan was done on July 7.  This CAT scan showed no acute intra-abdominal disease, stable nonobstructing left renal stones without hydronephrosis, normal caliber bowel loops, no colitis or enteritis. At TCM visit, Pt AM and Mid-day Seroquel was d/c'ed and changed to Cogentin. Caregivers report the [facility] would like Pt to resume Seroquel per [facility] orders, MAR corrected. Pt need new B.G. kit with supplies for BID sugar checks.           The following portions of the patient's history were reviewed and updated as appropriate:   He has a past medical history of ADRIANA (acute kidney injury) ([facility]) (9/24/2019), Bacteremia due to  Gram-positive bacteria (9/7/2021), Buttock wound, left, subsequent encounter (11/5/2021), COVID-19, CP (cerebral palsy) ([facility]), Depression, Diabetes ([facility]), Disease of thyroid gland, Gait disorder, Gluteal abscess (9/6/2021), Hyperlipidemia, Hypertension, Kidney failure, Kidney stones, Moderate protein-calorie malnutrition ([facility]) (12/22/2021), Osteoporosis, Pressure injury of left buttock, stage 4 ([facility]) (3/9/2022), Psychiatric disorder, Scoliosis, Seizures ([facility]), Sepsis ([facility]) (9/25/2019), Thyroid disease, and UTI (urinary tract infection).,  does not have any pertinent problems on file.,   has a past surgical history that includes Appendectomy; IR PICC placement single lumen (9/13/2021); IR PICC placement single lumen (9/16/2021); and IR chest tube placement (6/14/2024).,  family history is not on file.,   reports that he has never smoked. He has never been exposed to tobacco smoke. He has never used smokeless tobacco. He reports that he does not drink alcohol and does not use drugs.,  is allergic to depakote [valproic acid], erythromycin, and penicillins..  Current Outpatient Medications   Medication Sig Dispense Refill   • Blood Glucose Monitoring Suppl (OneTouch Verio Reflect) w/Device KIT Check blood sugars twice daily. Please substitute with appropriate alternative as covered by patient's insurance. Dx: E11.65 1 kit 0   • cyanocobalamin (VITAMIN B-12) 500 MCG tablet Take 1 tablet (500 mcg total) by mouth daily 30 tablet 0   • Depakote 500 MG DR tablet Take 1 tab in the morning and 2 tabs at night. Brand necessary 90 tablet 11   • Disposable Gloves (Safe-Sense Glove-Blk-Nitrl-L) MISC Use 1 each 3 (three) times a day as needed (care taker assisting with soiled briefs) 100 each 1   • docusate sodium (COLACE) 100 mg capsule Take 1 capsule (100 mg total) by mouth 2 (two) times a day 180 capsule 1   • folic acid (FOLVITE) 1 mg tablet Take 1 tablet (1 mg total) by mouth daily 30 tablet 0   • gabapentin (NEURONTIN) 400 mg  capsule Take 1 capsule (400 mg total) by mouth 3 (three) times a day     • glucose blood (OneTouch Verio) test strip Check blood sugars once daily. Please substitute with appropriate alternative as covered by patient's insurance. Dx: E11.65 100 each 3   • glucose blood (OneTouch Verio) test strip Check blood sugars twice daily. Please substitute with appropriate alternative as covered by patient's insurance. Dx: E11.65 200 each 3   • lacosamide (VIMPAT) 150 mg tablet Take 1 tab (150 mg) in the morning. 30 tablet 5   • lacosamide (VIMPAT) 200 mg tablet Take 1 tab (200 mg) at night. 30 tablet 5   • levETIRAcetam (KEPPRA) 750 mg tablet Give 2 tabs (1500 mg) by mouth twice a day 120 tablet 11   • levothyroxine 150 mcg tablet Take 1 tablet (150 mcg total) by mouth daily 90 tablet 3   • lisinopril (ZESTRIL) 5 mg tablet Take 1 tablet (5 mg total) by mouth daily 30 tablet 0   • loratadine (CLARITIN) 10 mg tablet Take 1 tablet (10 mg total) by mouth daily 90 tablet 3   • magnesium Oxide (MAG-OX) 400 mg TABS Take 1 tablet (400 mg total) by mouth 2 (two) times a day 60 tablet 0   • metFORMIN (GLUCOPHAGE) 1000 MG tablet GIVE 1 TABLET BY MOUTH TWICE DAILY WITH MEALS FOR DIABETES 180 tablet 0   • Multiple Vitamin (Daily-Reina) TABS Take 1 tablet by mouth daily Take at 8 AM 90 tablet 11   • Nutritional Supplements (Ensure Max Protein) LIQD Take 1 Bottle by mouth in the morning 990 mL 5   • OneTouch Delica Lancets 33G MISC Check blood sugars once daily. Please substitute with appropriate alternative as covered by patient's insurance. Dx: E11.65 100 each 3   • OneTouch Delica Lancets 33G MISC Check blood sugars twice daily. Please substitute with appropriate alternative as covered by patient's insurance. Dx: E11.65 200 each 3   • polyethylene glycol (MIRALAX) 17 g packet Take 17 g by mouth daily for 14 days 238 g 0   • QUEtiapine (SEROquel XR) 400 mg 24 hr tablet Take 1 tablet (400 mg total) by mouth daily at bedtime     • QUEtiapine  (SEROquel) 200 mg tablet Take 1 tablet (200 mg total) by mouth 2 (two) times a day before breakfast and lunch     • senna-docusate sodium (SENOKOT S) 8.6-50 mg per tablet Take 2 tablets by mouth 2 (two) times a day 120 tablet 0   • simvastatin (ZOCOR) 40 mg tablet GIVE 1 TABLET BY MOUTH AT BEDTIME FOR CHOLESTEROL 90 tablet 3   • temazepam (RESTORIL) 30 mg capsule Take 1 capsule (30 mg total) by mouth daily at bedtime for 10 days (Patient taking differently: Take 15 mg by mouth daily at bedtime as needed for sleep) 10 capsule 0   • zonisamide (ZONEGRAN) 100 mg capsule Take 3 capsules (300 mg total) by mouth daily at bedtime 90 capsule 11   • Incontinence Supply Disposable (Briefs Overnight Medium) MISC Use in the morning (Patient not taking: Reported on 8/1/2024) 20 each 11   • OneTouch Delica Lancets 33G MISC by Does not apply route daily TEST BLOOD SUGARS THREE TIMES DAILY (Patient not taking: Reported on 8/1/2024) 100 each 2     No current facility-administered medications for this visit.            Review of Systems   All other systems reviewed and are negative.        Objective:  Vitals:    08/01/24 1239   BP: 138/80   Pulse: 101   Temp: 97.8 °F (36.6 °C)   TempSrc: Tympanic   SpO2: 95%   Weight: 75.9 kg (167 lb 6.4 oz)   Height: 6' (1.829 m)     Body mass index is 22.7 kg/m².     Physical Exam  Vitals and nursing note reviewed.   Constitutional:       Appearance: Normal appearance. He is well-developed.   HENT:      Head: Normocephalic and atraumatic.      Right Ear: Tympanic membrane, ear canal and external ear normal.      Left Ear: Tympanic membrane, ear canal and external ear normal.      Nose: Nose normal.      Mouth/Throat:      Mouth: Mucous membranes are moist.      Pharynx: Uvula midline.   Eyes:      General: Lids are normal.      Conjunctiva/sclera: Conjunctivae normal.      Pupils: Pupils are equal, round, and reactive to light.   Neck:      Thyroid: No thyroid mass.      Vascular: No JVD.       "Trachea: Trachea and phonation normal.   Cardiovascular:      Rate and Rhythm: Normal rate and regular rhythm.      Pulses: Normal pulses.      Heart sounds: Normal heart sounds, S1 normal and S2 normal. No murmur heard.     No friction rub. No gallop.   Pulmonary:      Effort: Pulmonary effort is normal.      Breath sounds: Normal breath sounds.   Abdominal:      General: Bowel sounds are normal.      Palpations: Abdomen is soft.      Tenderness: There is no abdominal tenderness.   Genitourinary:     Comments: Deferred  Musculoskeletal:         General: Normal range of motion.      Cervical back: Full passive range of motion without pain, normal range of motion and neck supple.      Right lower leg: No edema.      Left lower leg: No edema.   Lymphadenopathy:      Head:      Right side of head: No submental, submandibular, tonsillar, preauricular, posterior auricular or occipital adenopathy.      Left side of head: No submental, submandibular, tonsillar, preauricular, posterior auricular or occipital adenopathy.      Cervical: No cervical adenopathy.   Skin:     General: Skin is warm and dry.      Capillary Refill: Capillary refill takes less than 2 seconds.   Neurological:      General: No focal deficit present.      Mental Status: He is alert and oriented to person, place, and time.      Sensory: Sensation is intact.      Motor: Motor function is intact.      Coordination: Coordination is intact.      Gait: Gait is intact.   Psychiatric:         Attention and Perception: Attention and perception normal.         Mood and Affect: Mood and affect normal.         Speech: Speech normal.         Behavior: Behavior normal. Behavior is cooperative.         Thought Content: Thought content normal.         Cognition and Memory: Cognition normal.         Judgment: Judgment normal.           Portions of the record may have been created with voice recognition software. Occasional wrong word or \"sound a like\" substitutions may " have occurred due to the inherent limitations of voice recognition software. Read the chart carefully and recognize, using context, where substitutions have occurred. Contact me with any questions.

## 2024-08-01 NOTE — LETTER
August 1, 2024     Patient: Jairon Oconnell  YOB: 1965  Date of Visit: 8/1/2024      To Whom it May Concern:    Jairon Oconnell is under my professional care. Jairon was seen in my office on 8/1/2024. Jairon should discontinue benztropine AKA Cogentin 0.5 mg QD.    If you have any questions or concerns, please don't hesitate to call.         Sincerely,          HUNTER Brown        CC: No Recipients

## 2024-08-01 NOTE — PROGRESS NOTES
Transition of Care  Follow-up After Hospitalization    Jairon Oconnell 59 y.o. male   Date:  8/1/2024    TCM Call     Date and time call was made  6/24/2024  8:45 AM    Hospital care reviewed  Records reviewed    Patient was hospitialized at  [facility]    Date of Admission  06/03/24    Date of discharge  06/21/24    Diagnosis  Cardiac arrest    Disposition  Home    Were the patients medications reviewed and updated  Yes    Current Symptoms  None    Fatigue severity  Mild      TCM Call     Post hospital issues  None    Should patient be enrolled in anticoag monitoring?  No    Scheduled for follow up?  Yes    Did you obtain your prescribed medications  Yes    Do you need help managing your prescriptions or medications  Yes    Is transportation to your appointment needed  Yes    I have advised the patient to call PCP with any new or worsening symptoms  SYDNIE Hernandez    Living Arrangements  Family members    Support System  Home care staff    Are you recieving any outpatient services  No    Are you recieving home care services  No    Types of home care services  Nurse visit    Are you using any community resources  Yes    Which resources are you recieving  WAIVER    Current waiver services  Yes    What services are you recieving  CAREGIVER    Have you fallen in the last 12 months  No    Interperter language line needed  No    Counseling  Caregiver    Comments  Will reach out to Pt caregiver to schedule TCM          Hospital records were reviewed. Medications upon discharge reviewed/updated.   Medication Changes: None  Imaging: XR abdomen, CT abdomen pelvis  Consults: None  Follow up visits with other specialists: None      Assessment and Plan:    There are no diagnoses linked to this encounter.        Jairon Oconnell present for TCM visit s/p hospitalization for constipation and swallowed foreign body.  Patient does have a chronic history of constipation and a history of PCOS.  He had presented to the ED with 5  days without a bowel movement and abdominal distention.  Caretakers reported no vomiting.  An x-ray was done which showed a large stool burden along with a fair amount of gas pattern in the right upper quadrant.  He also noted some hyperdense foreign bodies in the right mid quadrant.  I suspect is a metallic object they did a CAT scan which confirmed metallic foreign bodies in the right mid abdomen.  A follow-up CAT scan was done on July 7.  This CAT scan showed no acute intra-abdominal disease, stable nonobstructing left renal stones without hydronephrosis, normal caliber bowel loops, no colitis or enteritis.  HPI      ROS: Review of Systems   All other systems reviewed and are negative.      Past Medical History:   Diagnosis Date   • ADRIANA (acute kidney injury) (Formerly [facility]) 9/24/2019   • Bacteremia due to Gram-positive bacteria 9/7/2021   • Buttock wound, left, subsequent encounter 11/5/2021   • COVID-19    • CP (cerebral palsy) (Formerly [facility])    • Depression    • Diabetes (Formerly [facility])    • Disease of thyroid gland    • Gait disorder    • Gluteal abscess 9/6/2021   • Hyperlipidemia    • Hypertension    • Kidney failure    • Kidney stones    • Moderate protein-calorie malnutrition (Formerly [facility]) 12/22/2021   • Osteoporosis    • Pressure injury of left buttock, stage 4 (Formerly [facility]) 3/9/2022   • Psychiatric disorder    • Scoliosis    • Seizures (Formerly [facility])    • Sepsis (Formerly [facility]) 9/25/2019   • Thyroid disease    • UTI (urinary tract infection)        Past Surgical History:   Procedure Laterality Date   • APPENDECTOMY     • IR CHEST TUBE PLACEMENT  6/14/2024   • IR PICC PLACEMENT SINGLE LUMEN  9/13/2021   • IR PICC PLACEMENT SINGLE LUMEN  9/16/2021       Social History     Socioeconomic History   • Marital status: Single     Spouse name: None   • Number of children: None   • Years of education: None   • Highest education level: None   Occupational History   • None   Tobacco Use   • Smoking status: Never     Passive exposure: Never   • Smokeless tobacco: Never   Vaping  Use   • Vaping status: Never Used   Substance and Sexual Activity   • Alcohol use: Never   • Drug use: No   • Sexual activity: Not Currently   Other Topics Concern   • None   Social History Narrative   • None     Social Determinants of Health     Financial Resource Strain: Not on file   Food Insecurity: No Food Insecurity (6/5/2024)    Hunger Vital Sign    • Worried About Running Out of Food in the Last Year: Never true    • Ran Out of Food in the Last Year: Never true   Transportation Needs: No Transportation Needs (6/5/2024)    PRAPARE - Transportation    • Lack of Transportation (Medical): No    • Lack of Transportation (Non-Medical): No   Physical Activity: Not on file   Stress: Not on file   Social Connections: Not on file   Intimate Partner Violence: Not on file   Housing Stability: Low Risk  (6/5/2024)    Housing Stability Vital Sign    • Unable to Pay for Housing in the Last Year: No    • Number of Times Moved in the Last Year: 1    • Homeless in the Last Year: No       History reviewed. No pertinent family history.    Allergies   Allergen Reactions   • Depakote [Valproic Acid] Other (See Comments) and Seizures     Must have brand name Depakote. Off brand Depakote will induce seizures PER caregiver.   • Erythromycin Other (See Comments)     Unknown per pt   • Penicillins Other (See Comments)     Unknown per pt         Current Outpatient Medications:   •  benztropine (COGENTIN) 0.5 mg tablet, Take 1 tablet (0.5 mg total) by mouth daily, Disp: 90 tablet, Rfl: 1  •  Blood Glucose Monitoring Suppl (OneTouch Verio Reflect) w/Device KIT, Check blood sugars once daily. Please substitute with appropriate alternative as covered by patient's insurance. Dx: E11.65, Disp: 1 kit, Rfl: 0  •  cyanocobalamin (VITAMIN B-12) 500 MCG tablet, Take 1 tablet (500 mcg total) by mouth daily, Disp: 30 tablet, Rfl: 0  •  Depakote 500 MG DR tablet, Take 1 tab in the morning and 2 tabs at night. Brand necessary, Disp: 90 tablet, Rfl:  11  •  Disposable Gloves (Safe-Sense Glove-Blk-Nitrl-L) MISC, Use 1 each 3 (three) times a day as needed (care taker assisting with soiled briefs), Disp: 100 each, Rfl: 1  •  docusate sodium (COLACE) 100 mg capsule, Take 1 capsule (100 mg total) by mouth 2 (two) times a day, Disp: 180 capsule, Rfl: 1  •  folic acid (FOLVITE) 1 mg tablet, Take 1 tablet (1 mg total) by mouth daily, Disp: 30 tablet, Rfl: 0  •  gabapentin (NEURONTIN) 400 mg capsule, Take 1 capsule (400 mg total) by mouth 3 (three) times a day, Disp: , Rfl:   •  glucose blood (OneTouch Verio) test strip, Check blood sugars once daily. Please substitute with appropriate alternative as covered by patient's insurance. Dx: E11.65, Disp: 100 each, Rfl: 3  •  lacosamide (VIMPAT) 150 mg tablet, Take 1 tab (150 mg) in the morning., Disp: 30 tablet, Rfl: 5  •  lacosamide (VIMPAT) 200 mg tablet, Take 1 tab (200 mg) at night., Disp: 30 tablet, Rfl: 5  •  levETIRAcetam (KEPPRA) 750 mg tablet, Give 2 tabs (1500 mg) by mouth twice a day, Disp: 120 tablet, Rfl: 11  •  levothyroxine 150 mcg tablet, Take 1 tablet (150 mcg total) by mouth daily, Disp: 90 tablet, Rfl: 3  •  lisinopril (ZESTRIL) 5 mg tablet, Take 1 tablet (5 mg total) by mouth daily, Disp: 30 tablet, Rfl: 0  •  loratadine (CLARITIN) 10 mg tablet, Take 1 tablet (10 mg total) by mouth daily, Disp: 90 tablet, Rfl: 3  •  magnesium Oxide (MAG-OX) 400 mg TABS, Take 1 tablet (400 mg total) by mouth 2 (two) times a day, Disp: 60 tablet, Rfl: 0  •  metFORMIN (GLUCOPHAGE) 1000 MG tablet, GIVE 1 TABLET BY MOUTH TWICE DAILY WITH MEALS FOR DIABETES, Disp: 180 tablet, Rfl: 0  •  Multiple Vitamin (Daily-Reina) TABS, Take 1 tablet by mouth daily Take at 8 AM, Disp: 90 tablet, Rfl: 11  •  Nutritional Supplements (Ensure Max Protein) LIQD, Take 1 Bottle by mouth in the morning, Disp: 990 mL, Rfl: 5  •  OneTouch Delica Lancets 33G MISC, Check blood sugars once daily. Please substitute with appropriate alternative as covered by  patient's insurance. Dx: E11.65, Disp: 100 each, Rfl: 3  •  polyethylene glycol (MIRALAX) 17 g packet, Take 17 g by mouth daily for 14 days, Disp: 238 g, Rfl: 0  •  QUEtiapine (SEROquel XR) 400 mg 24 hr tablet, Take 1 tablet (400 mg total) by mouth daily at bedtime, Disp: , Rfl:   •  senna-docusate sodium (SENOKOT S) 8.6-50 mg per tablet, Take 2 tablets by mouth 2 (two) times a day, Disp: 120 tablet, Rfl: 0  •  simvastatin (ZOCOR) 40 mg tablet, GIVE 1 TABLET BY MOUTH AT BEDTIME FOR CHOLESTEROL, Disp: 90 tablet, Rfl: 3  •  temazepam (RESTORIL) 30 mg capsule, Take 1 capsule (30 mg total) by mouth daily at bedtime for 10 days (Patient taking differently: Take 15 mg by mouth daily at bedtime as needed for sleep), Disp: 10 capsule, Rfl: 0  •  zonisamide (ZONEGRAN) 100 mg capsule, Take 3 capsules (300 mg total) by mouth daily at bedtime, Disp: 90 capsule, Rfl: 11  •  Blood Glucose Monitoring Suppl (ONETOUCH VERIO) w/Device KIT, by Does not apply route 3 (three) times a day TEST BLOOD SUGARS THREE TIMES (Patient not taking: Reported on 8/1/2024), Disp: 1 kit, Rfl: 0  •  Incontinence Supply Disposable (Briefs Overnight Medium) MISC, Use in the morning (Patient not taking: Reported on 8/1/2024), Disp: 20 each, Rfl: 11  •  OneTouch Delica Lancets 33G MISC, by Does not apply route daily TEST BLOOD SUGARS THREE TIMES DAILY (Patient not taking: Reported on 8/1/2024), Disp: 100 each, Rfl: 2      Physical Exam:  /80   Pulse 101   Temp 97.8 °F (36.6 °C) (Tympanic)   Ht 6' (1.829 m)   Wt 75.9 kg (167 lb 6.4 oz)   SpO2 95%   BMI 22.70 kg/m²     Physical Exam  Vitals and nursing note reviewed.   Constitutional:       Appearance: Normal appearance. He is well-developed.   HENT:      Head: Normocephalic and atraumatic.      Right Ear: Tympanic membrane, ear canal and external ear normal.      Left Ear: Tympanic membrane, ear canal and external ear normal.      Nose: Nose normal.      Mouth/Throat:      Mouth: Mucous  membranes are moist.      Pharynx: Uvula midline.   Eyes:      General: Lids are normal.      Conjunctiva/sclera: Conjunctivae normal.      Pupils: Pupils are equal, round, and reactive to light.   Neck:      Thyroid: No thyroid mass.      Vascular: No JVD.      Trachea: Trachea and phonation normal.   Cardiovascular:      Rate and Rhythm: Normal rate and regular rhythm.      Pulses: Normal pulses.      Heart sounds: Normal heart sounds, S1 normal and S2 normal. No murmur heard.     No friction rub. No gallop.   Pulmonary:      Effort: Pulmonary effort is normal.      Breath sounds: Normal breath sounds.   Abdominal:      General: Bowel sounds are normal.      Palpations: Abdomen is soft.      Tenderness: There is no abdominal tenderness.   Genitourinary:     Comments: Deferred  Musculoskeletal:         General: Normal range of motion.      Cervical back: Full passive range of motion without pain, normal range of motion and neck supple.      Right lower leg: No edema.      Left lower leg: No edema.   Lymphadenopathy:      Head:      Right side of head: No submental, submandibular, tonsillar, preauricular, posterior auricular or occipital adenopathy.      Left side of head: No submental, submandibular, tonsillar, preauricular, posterior auricular or occipital adenopathy.      Cervical: No cervical adenopathy.   Skin:     General: Skin is warm and dry.      Capillary Refill: Capillary refill takes less than 2 seconds.   Neurological:      General: No focal deficit present.      Mental Status: He is alert and oriented to person, place, and time.      Sensory: Sensation is intact.      Motor: Motor function is intact.      Coordination: Coordination is intact.      Gait: Gait is intact.   Psychiatric:         Attention and Perception: Attention and perception normal.         Mood and Affect: Mood and affect normal.         Speech: Speech normal.         Behavior: Behavior normal. Behavior is cooperative.         Thought  "Content: Thought content normal.         Cognition and Memory: Cognition normal.         Judgment: Judgment normal.             Labs:  Lab Results   Component Value Date    WBC 11.42 (H) 06/17/2024    HGB 8.6 (L) 06/17/2024    HCT 27.4 (L) 06/17/2024     (H) 06/17/2024     (H) 06/17/2024     Lab Results   Component Value Date    K 4.5 06/17/2024     06/17/2024    CO2 29 06/17/2024    BUN 23 06/17/2024    CREATININE 0.61 06/17/2024    GLUCOSE 203 (H) 06/03/2024    GLUF 111 (H) 11/03/2023    CALCIUM 8.5 06/17/2024    CORRECTEDCA 9.6 06/14/2024    AST 36 06/14/2024    ALT 59 (H) 06/14/2024    ALKPHOS 89 06/14/2024    EGFR 110 06/17/2024             Portions of the record may have been created with voice recognition software. Occasional wrong word or \"sound a like\" substitutions may have occurred due to the inherent limitations of voice recognition software. Read the chart carefully and recognize, using context, where substitutions have occurred. Contact me with any questions.            "

## 2024-08-02 ENCOUNTER — TELEPHONE (OUTPATIENT)
Age: 59
End: 2024-08-02

## 2024-08-02 NOTE — TELEPHONE ENCOUNTER
Blair, from [facility] called in stating that they need pt medical records of the following:    Physical 2024 OVS  Last hearing exam  Last vision exam  Last prostate exam    To please fax the listed above to: [phone].    Please advise & call [facility] with update. Thank you!    Blair ([facility])   [phone]

## 2024-08-09 ENCOUNTER — HOSPITAL ENCOUNTER (EMERGENCY)
Facility: HOSPITAL | Age: 59
Discharge: ANOTHER HOSPITAL | End: 2024-08-09
Attending: EMERGENCY MEDICINE
Payer: MEDICARE

## 2024-08-09 VITALS
SYSTOLIC BLOOD PRESSURE: 182 MMHG | RESPIRATION RATE: 15 BRPM | HEART RATE: 100 BPM | DIASTOLIC BLOOD PRESSURE: 89 MMHG | OXYGEN SATURATION: 98 % | TEMPERATURE: 98.4 F

## 2024-08-09 DIAGNOSIS — S05.61XA: Primary | ICD-10-CM

## 2024-08-09 DIAGNOSIS — H11.421 CHEMOSIS OF RIGHT CONJUNCTIVA: ICD-10-CM

## 2024-08-09 PROCEDURE — 99285 EMERGENCY DEPT VISIT HI MDM: CPT | Performed by: EMERGENCY MEDICINE

## 2024-08-09 PROCEDURE — 99283 EMERGENCY DEPT VISIT LOW MDM: CPT

## 2024-08-09 RX ORDER — TETRACAINE HYDROCHLORIDE 5 MG/ML
2 SOLUTION OPHTHALMIC ONCE
Status: COMPLETED | OUTPATIENT
Start: 2024-08-09 | End: 2024-08-09

## 2024-08-09 RX ADMIN — FLUORESCEIN SODIUM 1 STRIP: 1 STRIP OPHTHALMIC at 20:24

## 2024-08-09 RX ADMIN — TETRACAINE HYDROCHLORIDE 2 DROP: 5 SOLUTION OPHTHALMIC at 20:24

## 2024-08-10 NOTE — ED NOTES
Jairon Oconnell transfer to [facility], [facility], Accepting is [facility] 2300  time as priority 1, air is grounded due to weather. Phone to call report [phone], chart copy and all films on disk send with pt      Kelley Lopez RN  08/09/24 2466

## 2024-08-10 NOTE — ED NOTES
Called report to RN at [facility] hospital.     Brittney Ruiz RN  08/09/24 1820

## 2024-08-10 NOTE — ED NOTES
"Unable to obtain accurate bedside visual acuity on pt due to mental delay. Pt refusing to continue reading chart. Caregiver told this RN \"that is about as much as you'll be able to get from him before he has a behavior episode.\" Provider made aware.     Elizabeth Soto RN  08/09/24 2029    "

## 2024-08-10 NOTE — EMTALA/ACUTE CARE TRANSFER
[facility] EMERGENCY DEPARTMENT  [address]  [place] [address]  Dept: [phone]      EMTALA TRANSFER CONSENT    NAME Jairon Oconnell                                        [facility] 1965                              MRN [id number]    I have been informed of my rights regarding examination, treatment, and transfer   by Dr. Kayla Kim,     Benefits:      Risks:            I authorize the performance of emergency medical procedures and treatments upon me in both transit and upon arrival at the receiving facility.  Additionally, I authorize the release of any and all medical records to the receiving facility and request they be transported with me, if possible.  I understand that the safest mode of transportation during a medical emergency is an ambulance and that the Hospital advocates the use of this mode of transport. Risks of traveling to the receiving facility by car, including absence of medical control, life sustaining equipment, such as oxygen, and medical personnel has been explained to me and I fully understand them.    (BRENT CORRECT BOX BELOW)  [  ]  I consent to the stated transfer and to be transported by ambulance/helicopter.  [  ]  I consent to the stated transfer, but refuse transportation by ambulance and accept full responsibility for my transportation by car.  I understand the risks of non-ambulance transfers and I exonerate the Hospital and its staff from any deterioration in my condition that results from this refusal.    X___________________________________________    DATE  24  TIME________  Signature of patient or legally responsible individual signing on patient behalf           RELATIONSHIP TO PATIENT_________________________          Provider Certification    NAME Jairon Oconnell                                         1965                              MRN [id number]    A medical screening exam was performed on the above named patient.  Based  on the examination:    Condition Necessitating Transfer The primary encounter diagnosis was Puncture wound of right eyeball, initial encounter. A diagnosis of Chemosis of right conjunctiva was also pertinent to this visit.    Patient Condition:      Reason for Transfer:      Transfer Requirements: Facility     Space available and qualified personnel available for treatment as acknowledged by    Agreed to accept transfer and to provide appropriate medical treatment as acknowledged by          Appropriate medical records of the examination and treatment of the patient are provided at the time of transfer   STAFF INITIAL WHEN COMPLETED _______  Transfer will be performed by qualified personnel from    and appropriate transfer equipment as required, including the use of necessary and appropriate life support measures.    Provider Certification: I have examined the patient and explained the following risks and benefits of being transferred/refusing transfer to the patient/family:         Based on these reasonable risks and benefits to the patient and/or the unborn child(aravind), and based upon the information available at the time of the patient’s examination, I certify that the medical benefits reasonably to be expected from the provision of appropriate medical treatments at another medical facility outweigh the increasing risks, if any, to the individual’s medical condition, and in the case of labor to the unborn child, from effecting the transfer.    X____________________________________________ DATE 08/09/24        TIME_______      ORIGINAL - SEND TO MEDICAL RECORDS   COPY - SEND WITH PATIENT DURING TRANSFER

## 2024-08-12 ENCOUNTER — TELEPHONE (OUTPATIENT)
Dept: NEUROLOGY | Facility: CLINIC | Age: 59
End: 2024-08-12

## 2024-08-12 DIAGNOSIS — E78.5 HYPERLIPIDEMIA, UNSPECIFIED HYPERLIPIDEMIA TYPE: ICD-10-CM

## 2024-08-12 DIAGNOSIS — E08.00 DIABETES MELLITUS DUE TO UNDERLYING CONDITION WITH HYPEROSMOLARITY WITHOUT COMA, WITHOUT LONG-TERM CURRENT USE OF INSULIN (HCC): ICD-10-CM

## 2024-08-12 RX ORDER — SIMVASTATIN 40 MG
TABLET ORAL
Qty: 30 TABLET | Refills: 0 | Status: SHIPPED | OUTPATIENT
Start: 2024-08-12 | End: 2024-09-11

## 2024-08-17 DIAGNOSIS — G93.41 METABOLIC ENCEPHALOPATHY: ICD-10-CM

## 2024-08-18 ENCOUNTER — APPOINTMENT (EMERGENCY)
Dept: RADIOLOGY | Facility: HOSPITAL | Age: 59
DRG: 872 | End: 2024-08-18
Payer: MEDICARE

## 2024-08-18 ENCOUNTER — HOSPITAL ENCOUNTER (INPATIENT)
Facility: HOSPITAL | Age: 59
LOS: 5 days | Discharge: HOME HEALTH | DRG: 872 | End: 2024-08-23
Attending: EMERGENCY MEDICINE | Admitting: FAMILY MEDICINE
Payer: MEDICARE

## 2024-08-18 ENCOUNTER — APPOINTMENT (EMERGENCY)
Dept: CT IMAGING | Facility: HOSPITAL | Age: 59
DRG: 872 | End: 2024-08-18
Payer: MEDICARE

## 2024-08-18 DIAGNOSIS — Z87.09 HISTORY OF PNEUMOTHORAX: ICD-10-CM

## 2024-08-18 DIAGNOSIS — F69 BEHAVIOR CONCERN IN ADULT: ICD-10-CM

## 2024-08-18 DIAGNOSIS — A41.9 SEPSIS (HCC): ICD-10-CM

## 2024-08-18 DIAGNOSIS — R53.1 RIGHT SIDED WEAKNESS: ICD-10-CM

## 2024-08-18 DIAGNOSIS — N39.0 UTI (URINARY TRACT INFECTION): Primary | ICD-10-CM

## 2024-08-18 LAB
ALBUMIN SERPL BCG-MCNC: 3.6 G/DL (ref 3.5–5)
ALP SERPL-CCNC: 56 U/L (ref 34–104)
ALT SERPL W P-5'-P-CCNC: 13 U/L (ref 7–52)
ANION GAP SERPL CALCULATED.3IONS-SCNC: 10 MMOL/L (ref 4–13)
ANISOCYTOSIS BLD QL SMEAR: PRESENT
APTT PPP: 34 SECONDS (ref 23–34)
AST SERPL W P-5'-P-CCNC: 18 U/L (ref 13–39)
B-OH-BUTYR SERPL-MCNC: <0.05 MMOL/L (ref 0.02–0.27)
BACTERIA UR QL AUTO: ABNORMAL /HPF
BASE EX.OXY STD BLDV CALC-SCNC: 79.1 % (ref 60–80)
BASE EXCESS BLDV CALC-SCNC: 0.7 MMOL/L
BASOPHILS # BLD MANUAL: 0.1 THOUSAND/UL (ref 0–0.1)
BASOPHILS NFR MAR MANUAL: 1 % (ref 0–1)
BILIRUB SERPL-MCNC: 0.26 MG/DL (ref 0.2–1)
BILIRUB UR QL STRIP: NEGATIVE
BUN SERPL-MCNC: 18 MG/DL (ref 5–25)
CALCIUM SERPL-MCNC: 8.8 MG/DL (ref 8.4–10.2)
CHLORIDE SERPL-SCNC: 100 MMOL/L (ref 96–108)
CLARITY UR: ABNORMAL
CO2 SERPL-SCNC: 23 MMOL/L (ref 21–32)
COLOR UR: ABNORMAL
CREAT SERPL-MCNC: 0.71 MG/DL (ref 0.6–1.3)
EOSINOPHIL # BLD MANUAL: 0.1 THOUSAND/UL (ref 0–0.4)
EOSINOPHIL NFR BLD MANUAL: 1 % (ref 0–6)
ERYTHROCYTE [DISTWIDTH] IN BLOOD BY AUTOMATED COUNT: 15.2 % (ref 11.6–15.1)
FLUAV RNA RESP QL NAA+PROBE: NEGATIVE
FLUBV RNA RESP QL NAA+PROBE: NEGATIVE
GFR SERPL CREATININE-BSD FRML MDRD: 104 ML/MIN/1.73SQ M
GLUCOSE SERPL-MCNC: 260 MG/DL (ref 65–140)
GLUCOSE SERPL-MCNC: 265 MG/DL (ref 65–140)
GLUCOSE UR STRIP-MCNC: NEGATIVE MG/DL
HCO3 BLDV-SCNC: 24.3 MMOL/L (ref 24–30)
HCT VFR BLD AUTO: 31 % (ref 36.5–49.3)
HGB BLD-MCNC: 9.9 G/DL (ref 12–17)
HGB UR QL STRIP.AUTO: ABNORMAL
INR PPP: 0.93 (ref 0.85–1.19)
KETONES UR STRIP-MCNC: NEGATIVE MG/DL
LACTATE SERPL-SCNC: 1.5 MMOL/L (ref 0.5–2)
LACTATE SERPL-SCNC: 2.6 MMOL/L (ref 0.5–2)
LEUKOCYTE ESTERASE UR QL STRIP: ABNORMAL
LYMPHOCYTES # BLD AUTO: 0.98 THOUSAND/UL (ref 0.6–4.47)
LYMPHOCYTES # BLD AUTO: 10 % (ref 14–44)
MCH RBC QN AUTO: 27 PG (ref 26.8–34.3)
MCHC RBC AUTO-ENTMCNC: 31.9 G/DL (ref 31.4–37.4)
MCV RBC AUTO: 85 FL (ref 82–98)
MONOCYTES # BLD AUTO: 2.55 THOUSAND/UL (ref 0–1.22)
MONOCYTES NFR BLD: 26 % (ref 4–12)
MYELOCYTE ABSOLUTE CT: 0.1 THOUSAND/UL (ref 0–0.1)
MYELOCYTES NFR BLD MANUAL: 1 % (ref 0–1)
NEUTROPHILS # BLD MANUAL: 5.97 THOUSAND/UL (ref 1.85–7.62)
NEUTS BAND NFR BLD MANUAL: 7 % (ref 0–8)
NEUTS SEG NFR BLD AUTO: 54 % (ref 43–75)
NITRITE UR QL STRIP: NEGATIVE
NON-SQ EPI CELLS URNS QL MICRO: ABNORMAL /HPF
O2 CT BLDV-SCNC: 12.2 ML/DL
PCO2 BLDV: 35.3 MM HG (ref 42–50)
PH BLDV: 7.46 [PH] (ref 7.3–7.4)
PH UR STRIP.AUTO: 7.5 [PH]
PLATELET # BLD AUTO: 266 THOUSANDS/UL (ref 149–390)
PLATELET BLD QL SMEAR: ADEQUATE
PMV BLD AUTO: 11.3 FL (ref 8.9–12.7)
PO2 BLDV: 45.2 MM HG (ref 35–45)
POLYCHROMASIA BLD QL SMEAR: PRESENT
POTASSIUM SERPL-SCNC: 4.5 MMOL/L (ref 3.5–5.3)
PROCALCITONIN SERPL-MCNC: 3.31 NG/ML
PROT SERPL-MCNC: 7 G/DL (ref 6.4–8.4)
PROT UR STRIP-MCNC: ABNORMAL MG/DL
PROTHROMBIN TIME: 13.2 SECONDS (ref 12.3–15)
RBC # BLD AUTO: 3.67 MILLION/UL (ref 3.88–5.62)
RBC #/AREA URNS AUTO: ABNORMAL /HPF
RSV RNA RESP QL NAA+PROBE: NEGATIVE
SARS-COV-2 RNA RESP QL NAA+PROBE: NEGATIVE
SODIUM SERPL-SCNC: 133 MMOL/L (ref 135–147)
SP GR UR STRIP.AUTO: 1.01 (ref 1–1.03)
UROBILINOGEN UR STRIP-ACNC: <2 MG/DL
WBC # BLD AUTO: 9.79 THOUSAND/UL (ref 4.31–10.16)
WBC #/AREA URNS AUTO: ABNORMAL /HPF

## 2024-08-18 PROCEDURE — 70450 CT HEAD/BRAIN W/O DYE: CPT

## 2024-08-18 PROCEDURE — 87077 CULTURE AEROBIC IDENTIFY: CPT | Performed by: EMERGENCY MEDICINE

## 2024-08-18 PROCEDURE — 96368 THER/DIAG CONCURRENT INF: CPT

## 2024-08-18 PROCEDURE — 85610 PROTHROMBIN TIME: CPT | Performed by: EMERGENCY MEDICINE

## 2024-08-18 PROCEDURE — 0241U HB NFCT DS VIR RESP RNA 4 TRGT: CPT | Performed by: EMERGENCY MEDICINE

## 2024-08-18 PROCEDURE — 81001 URINALYSIS AUTO W/SCOPE: CPT | Performed by: EMERGENCY MEDICINE

## 2024-08-18 PROCEDURE — 71045 X-RAY EXAM CHEST 1 VIEW: CPT

## 2024-08-18 PROCEDURE — 87186 SC STD MICRODIL/AGAR DIL: CPT | Performed by: EMERGENCY MEDICINE

## 2024-08-18 PROCEDURE — 84145 PROCALCITONIN (PCT): CPT | Performed by: EMERGENCY MEDICINE

## 2024-08-18 PROCEDURE — 96361 HYDRATE IV INFUSION ADD-ON: CPT

## 2024-08-18 PROCEDURE — 99285 EMERGENCY DEPT VISIT HI MDM: CPT

## 2024-08-18 PROCEDURE — 80053 COMPREHEN METABOLIC PANEL: CPT | Performed by: EMERGENCY MEDICINE

## 2024-08-18 PROCEDURE — 85027 COMPLETE CBC AUTOMATED: CPT | Performed by: EMERGENCY MEDICINE

## 2024-08-18 PROCEDURE — 87040 BLOOD CULTURE FOR BACTERIA: CPT | Performed by: EMERGENCY MEDICINE

## 2024-08-18 PROCEDURE — 82010 KETONE BODYS QUAN: CPT | Performed by: EMERGENCY MEDICINE

## 2024-08-18 PROCEDURE — 83605 ASSAY OF LACTIC ACID: CPT | Performed by: EMERGENCY MEDICINE

## 2024-08-18 PROCEDURE — 96365 THER/PROPH/DIAG IV INF INIT: CPT

## 2024-08-18 PROCEDURE — 87086 URINE CULTURE/COLONY COUNT: CPT | Performed by: EMERGENCY MEDICINE

## 2024-08-18 PROCEDURE — 85730 THROMBOPLASTIN TIME PARTIAL: CPT | Performed by: EMERGENCY MEDICINE

## 2024-08-18 PROCEDURE — 87154 CUL TYP ID BLD PTHGN 6+ TRGT: CPT | Performed by: EMERGENCY MEDICINE

## 2024-08-18 PROCEDURE — 85007 BL SMEAR W/DIFF WBC COUNT: CPT | Performed by: EMERGENCY MEDICINE

## 2024-08-18 PROCEDURE — 36415 COLL VENOUS BLD VENIPUNCTURE: CPT | Performed by: EMERGENCY MEDICINE

## 2024-08-18 PROCEDURE — 82805 BLOOD GASES W/O2 SATURATION: CPT | Performed by: EMERGENCY MEDICINE

## 2024-08-18 PROCEDURE — 93005 ELECTROCARDIOGRAM TRACING: CPT

## 2024-08-18 PROCEDURE — 82948 REAGENT STRIP/BLOOD GLUCOSE: CPT

## 2024-08-18 RX ORDER — QUETIAPINE FUMARATE 100 MG/1
200 TABLET, FILM COATED ORAL
Status: DISCONTINUED | OUTPATIENT
Start: 2024-08-19 | End: 2024-08-23 | Stop reason: HOSPADM

## 2024-08-18 RX ORDER — ZONISAMIDE 100 MG/1
300 CAPSULE ORAL
Status: DISCONTINUED | OUTPATIENT
Start: 2024-08-19 | End: 2024-08-23 | Stop reason: HOSPADM

## 2024-08-18 RX ORDER — QUETIAPINE 200 MG/1
400 TABLET, FILM COATED, EXTENDED RELEASE ORAL
Status: DISCONTINUED | OUTPATIENT
Start: 2024-08-19 | End: 2024-08-23 | Stop reason: HOSPADM

## 2024-08-18 RX ORDER — LACOSAMIDE 100 MG/1
200 TABLET ORAL
Status: DISCONTINUED | OUTPATIENT
Start: 2024-08-19 | End: 2024-08-23 | Stop reason: HOSPADM

## 2024-08-18 RX ORDER — LEVETIRACETAM 500 MG/1
1500 TABLET ORAL EVERY 12 HOURS SCHEDULED
Status: DISCONTINUED | OUTPATIENT
Start: 2024-08-19 | End: 2024-08-23 | Stop reason: HOSPADM

## 2024-08-18 RX ORDER — LEVOTHYROXINE SODIUM 150 UG/1
150 TABLET ORAL
Status: DISCONTINUED | OUTPATIENT
Start: 2024-08-19 | End: 2024-08-23 | Stop reason: HOSPADM

## 2024-08-18 RX ORDER — PRAVASTATIN SODIUM 80 MG/1
80 TABLET ORAL
Status: DISCONTINUED | OUTPATIENT
Start: 2024-08-19 | End: 2024-08-23 | Stop reason: HOSPADM

## 2024-08-18 RX ORDER — ACETAMINOPHEN 325 MG/1
650 TABLET ORAL EVERY 6 HOURS PRN
Status: DISCONTINUED | OUTPATIENT
Start: 2024-08-18 | End: 2024-08-19

## 2024-08-18 RX ORDER — DIVALPROEX SODIUM 500 MG/1
500 TABLET, EXTENDED RELEASE ORAL DAILY
Status: DISCONTINUED | OUTPATIENT
Start: 2024-08-20 | End: 2024-08-23 | Stop reason: HOSPADM

## 2024-08-18 RX ORDER — FOLIC ACID 1 MG/1
TABLET ORAL
Qty: 25 TABLET | Refills: 5 | Status: SHIPPED | OUTPATIENT
Start: 2024-08-18

## 2024-08-18 RX ORDER — FOLIC ACID 1 MG/1
1 TABLET ORAL DAILY
Status: DISCONTINUED | OUTPATIENT
Start: 2024-08-19 | End: 2024-08-23 | Stop reason: HOSPADM

## 2024-08-18 RX ORDER — GABAPENTIN 400 MG/1
400 CAPSULE ORAL 3 TIMES DAILY
Status: DISCONTINUED | OUTPATIENT
Start: 2024-08-19 | End: 2024-08-23 | Stop reason: HOSPADM

## 2024-08-18 RX ORDER — AMOXICILLIN 250 MG
2 CAPSULE ORAL 2 TIMES DAILY
Status: DISCONTINUED | OUTPATIENT
Start: 2024-08-19 | End: 2024-08-23 | Stop reason: HOSPADM

## 2024-08-18 RX ORDER — INSULIN LISPRO 100 [IU]/ML
1-5 INJECTION, SOLUTION INTRAVENOUS; SUBCUTANEOUS
Status: DISCONTINUED | OUTPATIENT
Start: 2024-08-19 | End: 2024-08-23 | Stop reason: HOSPADM

## 2024-08-18 RX ORDER — POLYETHYLENE GLYCOL 3350 17 G/17G
17 POWDER, FOR SOLUTION ORAL DAILY PRN
Status: DISCONTINUED | OUTPATIENT
Start: 2024-08-18 | End: 2024-08-23 | Stop reason: HOSPADM

## 2024-08-18 RX ORDER — SODIUM CHLORIDE, SODIUM LACTATE, POTASSIUM CHLORIDE, CALCIUM CHLORIDE 600; 310; 30; 20 MG/100ML; MG/100ML; MG/100ML; MG/100ML
75 INJECTION, SOLUTION INTRAVENOUS CONTINUOUS
Status: DISPENSED | OUTPATIENT
Start: 2024-08-19 | End: 2024-08-19

## 2024-08-18 RX ORDER — LORATADINE 10 MG/1
10 TABLET ORAL DAILY
Status: DISCONTINUED | OUTPATIENT
Start: 2024-08-19 | End: 2024-08-23 | Stop reason: HOSPADM

## 2024-08-18 RX ORDER — DOCUSATE SODIUM 100 MG/1
100 CAPSULE, LIQUID FILLED ORAL 2 TIMES DAILY
Status: DISCONTINUED | OUTPATIENT
Start: 2024-08-19 | End: 2024-08-23 | Stop reason: HOSPADM

## 2024-08-18 RX ORDER — ENOXAPARIN SODIUM 100 MG/ML
40 INJECTION SUBCUTANEOUS DAILY
Status: DISCONTINUED | OUTPATIENT
Start: 2024-08-19 | End: 2024-08-23 | Stop reason: HOSPADM

## 2024-08-18 RX ORDER — ACETAMINOPHEN 10 MG/ML
1000 INJECTION, SOLUTION INTRAVENOUS ONCE
Status: COMPLETED | OUTPATIENT
Start: 2024-08-18 | End: 2024-08-18

## 2024-08-18 RX ORDER — DIVALPROEX SODIUM 500 MG/1
1000 TABLET, EXTENDED RELEASE ORAL
Status: DISCONTINUED | OUTPATIENT
Start: 2024-08-19 | End: 2024-08-23 | Stop reason: HOSPADM

## 2024-08-18 RX ORDER — TEMAZEPAM 15 MG/1
15 CAPSULE ORAL
Status: DISCONTINUED | OUTPATIENT
Start: 2024-08-18 | End: 2024-08-23 | Stop reason: HOSPADM

## 2024-08-18 RX ORDER — LISINOPRIL 5 MG/1
5 TABLET ORAL DAILY
Status: DISCONTINUED | OUTPATIENT
Start: 2024-08-19 | End: 2024-08-23 | Stop reason: HOSPADM

## 2024-08-18 RX ADMIN — CEFTRIAXONE SODIUM 1000 MG: 10 INJECTION, POWDER, FOR SOLUTION INTRAVENOUS at 22:30

## 2024-08-18 RX ADMIN — ACETAMINOPHEN 1000 MG: 10 INJECTION INTRAVENOUS at 22:43

## 2024-08-18 RX ADMIN — SODIUM CHLORIDE 1000 ML: 0.9 INJECTION, SOLUTION INTRAVENOUS at 20:28

## 2024-08-18 RX ADMIN — SODIUM CHLORIDE 1000 ML: 0.9 INJECTION, SOLUTION INTRAVENOUS at 22:28

## 2024-08-19 ENCOUNTER — APPOINTMENT (INPATIENT)
Dept: CT IMAGING | Facility: HOSPITAL | Age: 59
DRG: 872 | End: 2024-08-19
Payer: MEDICARE

## 2024-08-19 PROBLEM — R53.1 WEAKNESS: Status: ACTIVE | Noted: 2024-08-19

## 2024-08-19 LAB
ANION GAP SERPL CALCULATED.3IONS-SCNC: 7 MMOL/L (ref 4–13)
ATRIAL RATE: 119 BPM
BUN SERPL-MCNC: 15 MG/DL (ref 5–25)
CALCIUM SERPL-MCNC: 8.6 MG/DL (ref 8.4–10.2)
CHLORIDE SERPL-SCNC: 104 MMOL/L (ref 96–108)
CO2 SERPL-SCNC: 25 MMOL/L (ref 21–32)
CREAT SERPL-MCNC: 0.63 MG/DL (ref 0.6–1.3)
GFR SERPL CREATININE-BSD FRML MDRD: 109 ML/MIN/1.73SQ M
GLUCOSE SERPL-MCNC: 117 MG/DL (ref 65–140)
GLUCOSE SERPL-MCNC: 132 MG/DL (ref 65–140)
GLUCOSE SERPL-MCNC: 136 MG/DL (ref 65–140)
GLUCOSE SERPL-MCNC: 170 MG/DL (ref 65–140)
GLUCOSE SERPL-MCNC: 184 MG/DL (ref 65–140)
GLUCOSE SERPL-MCNC: 196 MG/DL (ref 65–140)
P AXIS: 44 DEGREES
POTASSIUM SERPL-SCNC: 4.1 MMOL/L (ref 3.5–5.3)
PR INTERVAL: 154 MS
QRS AXIS: 2 DEGREES
QRSD INTERVAL: 140 MS
QT INTERVAL: 344 MS
QTC INTERVAL: 483 MS
SODIUM SERPL-SCNC: 136 MMOL/L (ref 135–147)
T WAVE AXIS: 13 DEGREES
VENTRICULAR RATE: 119 BPM

## 2024-08-19 PROCEDURE — 82948 REAGENT STRIP/BLOOD GLUCOSE: CPT

## 2024-08-19 PROCEDURE — 71260 CT THORAX DX C+: CPT

## 2024-08-19 PROCEDURE — 99233 SBSQ HOSP IP/OBS HIGH 50: CPT | Performed by: STUDENT IN AN ORGANIZED HEALTH CARE EDUCATION/TRAINING PROGRAM

## 2024-08-19 PROCEDURE — 99223 1ST HOSP IP/OBS HIGH 75: CPT | Performed by: PHYSICIAN ASSISTANT

## 2024-08-19 PROCEDURE — 99285 EMERGENCY DEPT VISIT HI MDM: CPT | Performed by: EMERGENCY MEDICINE

## 2024-08-19 PROCEDURE — 70498 CT ANGIOGRAPHY NECK: CPT

## 2024-08-19 PROCEDURE — 93010 ELECTROCARDIOGRAM REPORT: CPT | Performed by: INTERNAL MEDICINE

## 2024-08-19 PROCEDURE — 99223 1ST HOSP IP/OBS HIGH 75: CPT | Performed by: INTERNAL MEDICINE

## 2024-08-19 PROCEDURE — 87040 BLOOD CULTURE FOR BACTERIA: CPT | Performed by: STUDENT IN AN ORGANIZED HEALTH CARE EDUCATION/TRAINING PROGRAM

## 2024-08-19 PROCEDURE — 74177 CT ABD & PELVIS W/CONTRAST: CPT

## 2024-08-19 PROCEDURE — 80048 BASIC METABOLIC PNL TOTAL CA: CPT | Performed by: PHYSICIAN ASSISTANT

## 2024-08-19 PROCEDURE — 70496 CT ANGIOGRAPHY HEAD: CPT

## 2024-08-19 RX ORDER — ACETAMINOPHEN 10 MG/ML
1000 INJECTION, SOLUTION INTRAVENOUS ONCE
Status: COMPLETED | OUTPATIENT
Start: 2024-08-19 | End: 2024-08-19

## 2024-08-19 RX ORDER — ASPIRIN 81 MG/1
81 TABLET, CHEWABLE ORAL DAILY
Status: DISCONTINUED | OUTPATIENT
Start: 2024-08-20 | End: 2024-08-21

## 2024-08-19 RX ORDER — OLANZAPINE 2.5 MG/1
5 TABLET, FILM COATED ORAL ONCE AS NEEDED
Status: COMPLETED | OUTPATIENT
Start: 2024-08-19 | End: 2024-08-20

## 2024-08-19 RX ADMIN — CEFTRIAXONE SODIUM 2000 MG: 10 INJECTION, POWDER, FOR SOLUTION INTRAVENOUS at 09:44

## 2024-08-19 RX ADMIN — GABAPENTIN 400 MG: 400 CAPSULE ORAL at 18:00

## 2024-08-19 RX ADMIN — QUETIAPINE FUMARATE 200 MG: 100 TABLET ORAL at 06:17

## 2024-08-19 RX ADMIN — SODIUM CHLORIDE, SODIUM LACTATE, POTASSIUM CHLORIDE, AND CALCIUM CHLORIDE 75 ML/HR: .6; .31; .03; .02 INJECTION, SOLUTION INTRAVENOUS at 11:29

## 2024-08-19 RX ADMIN — DIVALPROEX SODIUM 1000 MG: 500 TABLET, EXTENDED RELEASE ORAL at 21:27

## 2024-08-19 RX ADMIN — QUETIAPINE 400 MG: 200 TABLET, FILM COATED, EXTENDED RELEASE ORAL at 21:28

## 2024-08-19 RX ADMIN — LEVOTHYROXINE SODIUM 150 MCG: 0.15 TABLET ORAL at 06:17

## 2024-08-19 RX ADMIN — DOCUSATE SODIUM 100 MG: 100 CAPSULE, LIQUID FILLED ORAL at 08:32

## 2024-08-19 RX ADMIN — IOHEXOL 160 ML: 350 INJECTION, SOLUTION INTRAVENOUS at 15:02

## 2024-08-19 RX ADMIN — ENOXAPARIN SODIUM 40 MG: 40 INJECTION SUBCUTANEOUS at 08:32

## 2024-08-19 RX ADMIN — GABAPENTIN 400 MG: 400 CAPSULE ORAL at 00:39

## 2024-08-19 RX ADMIN — CYANOCOBALAMIN TAB 500 MCG 500 MCG: 500 TAB at 08:32

## 2024-08-19 RX ADMIN — ZONISAMIDE 300 MG: 100 CAPSULE ORAL at 21:29

## 2024-08-19 RX ADMIN — INSULIN LISPRO 1 UNITS: 100 INJECTION, SOLUTION INTRAVENOUS; SUBCUTANEOUS at 08:54

## 2024-08-19 RX ADMIN — LACOSAMIDE 200 MG: 100 TABLET, FILM COATED ORAL at 00:39

## 2024-08-19 RX ADMIN — SODIUM CHLORIDE 500 ML: 0.9 INJECTION, SOLUTION INTRAVENOUS at 11:28

## 2024-08-19 RX ADMIN — QUETIAPINE 400 MG: 200 TABLET, FILM COATED, EXTENDED RELEASE ORAL at 00:39

## 2024-08-19 RX ADMIN — LORATADINE 10 MG: 10 TABLET ORAL at 08:35

## 2024-08-19 RX ADMIN — LISINOPRIL 5 MG: 5 TABLET ORAL at 08:35

## 2024-08-19 RX ADMIN — GABAPENTIN 400 MG: 400 CAPSULE ORAL at 08:46

## 2024-08-19 RX ADMIN — LACOSAMIDE 150 MG: 100 TABLET, FILM COATED ORAL at 08:34

## 2024-08-19 RX ADMIN — INSULIN LISPRO 1 UNITS: 100 INJECTION, SOLUTION INTRAVENOUS; SUBCUTANEOUS at 12:00

## 2024-08-19 RX ADMIN — DOCUSATE SODIUM 100 MG: 100 CAPSULE, LIQUID FILLED ORAL at 18:03

## 2024-08-19 RX ADMIN — B-COMPLEX W/ C & FOLIC ACID TAB 1 TABLET: TAB at 08:36

## 2024-08-19 RX ADMIN — LEVETIRACETAM 1500 MG: 500 TABLET, FILM COATED ORAL at 21:27

## 2024-08-19 RX ADMIN — SENNOSIDES AND DOCUSATE SODIUM 2 TABLET: 50; 8.6 TABLET ORAL at 08:35

## 2024-08-19 RX ADMIN — SODIUM CHLORIDE, SODIUM LACTATE, POTASSIUM CHLORIDE, AND CALCIUM CHLORIDE 75 ML/HR: .6; .31; .03; .02 INJECTION, SOLUTION INTRAVENOUS at 00:06

## 2024-08-19 RX ADMIN — LACOSAMIDE 200 MG: 100 TABLET, FILM COATED ORAL at 21:27

## 2024-08-19 RX ADMIN — INSULIN LISPRO 1 UNITS: 100 INJECTION, SOLUTION INTRAVENOUS; SUBCUTANEOUS at 00:40

## 2024-08-19 RX ADMIN — ZONISAMIDE 300 MG: 100 CAPSULE ORAL at 00:40

## 2024-08-19 RX ADMIN — LEVETIRACETAM 1500 MG: 500 TABLET, FILM COATED ORAL at 00:39

## 2024-08-19 RX ADMIN — FOLIC ACID 1 MG: 1 TABLET ORAL at 08:35

## 2024-08-19 RX ADMIN — GABAPENTIN 400 MG: 400 CAPSULE ORAL at 21:26

## 2024-08-19 RX ADMIN — ACETAMINOPHEN 1000 MG: 10 INJECTION INTRAVENOUS at 08:46

## 2024-08-19 RX ADMIN — SENNOSIDES AND DOCUSATE SODIUM 2 TABLET: 50; 8.6 TABLET ORAL at 18:02

## 2024-08-19 RX ADMIN — PRAVASTATIN SODIUM 80 MG: 80 TABLET ORAL at 18:00

## 2024-08-19 RX ADMIN — LEVETIRACETAM 1500 MG: 500 TABLET, FILM COATED ORAL at 08:46

## 2024-08-19 NOTE — ASSESSMENT & PLAN NOTE
Caregiver reporting more lethargy over the last 2 days from baseline but currently patient is at his baseline in regards to behavior and mental status  Continue Seroquel 3 times daily, as needed temazepam, zonisamide  Fall precautions, delirium precautions, provide frequent verbal redirection

## 2024-08-19 NOTE — ED PROVIDER NOTES
History  Chief Complaint   Patient presents with    Hyperglycemia - Symptomatic     Per caregiver at bedside pt has had BG >230 at home which is unusual for him. Caregiver admits pt has been experiencing shaking and increased fatigue today along with urinary incontinence over the past few days.      59-year-old male presenting to the emergency department for evaluation of high blood sugar.  Patient has caregivers at home.  They are concerned because he noticed that his blood sugar was over 200 today.  He has a history of diabetes on metformin but his blood sugars are typically well-controlled.  His symptoms are in the context of several days of shaking/chills, as well as urinary frequency and incontinence which is not normal for him.  Patient himself has had no pain, complains of no fevers shortness of breath abdominal pain nausea or vomiting.        Prior to Admission Medications   Prescriptions Last Dose Informant Patient Reported? Taking?   Blood Glucose Monitoring Suppl (OneTouch Verio Reflect) w/Device KIT   No No   Sig: Check blood sugars twice daily. Please substitute with appropriate alternative as covered by patient's insurance. Dx: E11.65   Depakote 500 MG DR tablet   No No   Sig: Take 1 tab in the morning and 2 tabs at night. Brand necessary   Disposable Gloves (Safe-Sense Glove-Blk-Nitrl-L) MISC  Care Giver No No   Sig: Use 1 each 3 (three) times a day as needed (care taker assisting with soiled briefs)   Incontinence Supply Disposable (Briefs Overnight Medium) MISC  Care Giver No No   Sig: Use in the morning   Patient not taking: Reported on 8/1/2024   Multiple Vitamin (Daily-Reina) TABS  Care Giver No No   Sig: Take 1 tablet by mouth daily Take at 8 AM   Nutritional Supplements (Ensure Max Protein) LIQD   No No   Sig: Take 1 Bottle by mouth in the morning   OneTouch Delica Lancets 33G MISC  Care Giver No No   Sig: by Does not apply route daily TEST BLOOD SUGARS THREE TIMES DAILY   Patient not taking:  Reported on 8/1/2024   OneTouch Delica Lancets 33G MISC   No No   Sig: Check blood sugars once daily. Please substitute with appropriate alternative as covered by patient's insurance. Dx: E11.65   OneTouch Delica Lancets 33G MISC   No No   Sig: Check blood sugars twice daily. Please substitute with appropriate alternative as covered by patient's insurance. Dx: E11.65   QUEtiapine (SEROquel XR) 400 mg 24 hr tablet   No No   Sig: Take 1 tablet (400 mg total) by mouth daily at bedtime   QUEtiapine (SEROquel) 200 mg tablet   No No   Sig: Take 1 tablet (200 mg total) by mouth 2 (two) times a day before breakfast and lunch   cyanocobalamin (VITAMIN B-12) 500 MCG tablet   No No   Sig: Take 1 tablet (500 mcg total) by mouth daily   docusate sodium (COLACE) 100 mg capsule   No No   Sig: Take 1 capsule (100 mg total) by mouth 2 (two) times a day   folic acid (FOLVITE) 1 mg tablet   No No   Sig: GIVE 1 TABLET BY MOUTH DAILY DX METABOLIC ENCEPHALOPATHY   gabapentin (NEURONTIN) 400 mg capsule  Care Giver No No   Sig: Take 1 capsule (400 mg total) by mouth 3 (three) times a day   glucose blood (OneTouch Verio) test strip   No No   Sig: Check blood sugars once daily. Please substitute with appropriate alternative as covered by patient's insurance. Dx: E11.65   glucose blood (OneTouch Verio) test strip   No No   Sig: Check blood sugars twice daily. Please substitute with appropriate alternative as covered by patient's insurance. Dx: E11.65   lacosamide (VIMPAT) 150 mg tablet   No No   Sig: Take 1 tab (150 mg) in the morning.   lacosamide (VIMPAT) 200 mg tablet   No No   Sig: Take 1 tab (200 mg) at night.   levETIRAcetam (KEPPRA) 750 mg tablet   No No   Sig: Give 2 tabs (1500 mg) by mouth twice a day   levothyroxine 150 mcg tablet   No No   Sig: Take 1 tablet (150 mcg total) by mouth daily   lisinopril (ZESTRIL) 5 mg tablet   No No   Sig: Take 1 tablet (5 mg total) by mouth daily   loratadine (CLARITIN) 10 mg tablet  Care Giver No  No   Sig: Take 1 tablet (10 mg total) by mouth daily   magnesium Oxide (MAG-OX) 400 mg TABS   No No   Sig: Take 1 tablet (400 mg total) by mouth 2 (two) times a day   metFORMIN (GLUCOPHAGE) 1000 MG tablet   No No   Sig: GIVE 1 TABLET BY MOUTH TWICE DAILY WITH MEALS FOR DIABETES   polyethylene glycol (MIRALAX) 17 g packet   No No   Sig: Take 17 g by mouth daily for 14 days   senna-docusate sodium (SENOKOT S) 8.6-50 mg per tablet   No No   Sig: Take 2 tablets by mouth 2 (two) times a day   simvastatin (ZOCOR) 40 mg tablet   No No   Sig: GIVE 1 TABLET BY MOUTH AT BEDTIME FOR CHOLESTEROL   temazepam (RESTORIL) 30 mg capsule  Care Giver No No   Sig: Take 1 capsule (30 mg total) by mouth daily at bedtime for 10 days   Patient taking differently: Take 15 mg by mouth daily at bedtime as needed for sleep   zonisamide (ZONEGRAN) 100 mg capsule   No No   Sig: Take 3 capsules (300 mg total) by mouth daily at bedtime      Facility-Administered Medications: None       Past Medical History:   Diagnosis Date    ADRIANA (acute kidney injury) ([facility]) 9/24/2019    Bacteremia due to Gram-positive bacteria 9/7/2021    Buttock wound, left, subsequent encounter 11/5/2021    COVID-19     CP (cerebral palsy) ([facility])     Depression     Diabetes ([facility])     Disease of thyroid gland     Gait disorder     Gluteal abscess 9/6/2021    Hyperlipidemia     Hypertension     Kidney failure     Kidney stones     Moderate protein-calorie malnutrition ([facility]) 12/22/2021    Osteoporosis     Pressure injury of left buttock, stage 4 ([facility]) 3/9/2022    Psychiatric disorder     Scoliosis     Seizures ([facility])     Sepsis ([facility]) 9/25/2019    Thyroid disease     UTI (urinary tract infection)        Past Surgical History:   Procedure Laterality Date    APPENDECTOMY      IR CHEST TUBE PLACEMENT  6/14/2024    IR PICC PLACEMENT SINGLE LUMEN  9/13/2021    IR PICC PLACEMENT SINGLE LUMEN  9/16/2021       History reviewed. No pertinent family history.  I have reviewed and agree with the  history as documented.    E-Cigarette/Vaping    E-Cigarette Use Never User      E-Cigarette/Vaping Substances    Nicotine No     THC No     CBD No     Flavoring No     Other No     Unknown No      Social History     Tobacco Use    Smoking status: Never     Passive exposure: Never    Smokeless tobacco: Never   Vaping Use    Vaping status: Never Used   Substance Use Topics    Alcohol use: Never    Drug use: No       Review of Systems   Constitutional:  Negative for appetite change, chills, fatigue and fever.   HENT:  Negative for sneezing and sore throat.    Eyes:  Negative for visual disturbance.   Respiratory:  Negative for cough, choking, chest tightness, shortness of breath and wheezing.    Cardiovascular:  Negative for chest pain and palpitations.   Gastrointestinal:  Negative for abdominal pain, constipation, diarrhea, nausea and vomiting.   Genitourinary:  Positive for frequency. Negative for difficulty urinating and dysuria.   Neurological:  Negative for dizziness, weakness, light-headedness, numbness and headaches.   All other systems reviewed and are negative.      Physical Exam  Physical Exam  Constitutional:       General: He is not in acute distress.     Appearance: He is well-developed. He is not diaphoretic.   HENT:      Head: Normocephalic and atraumatic.   Eyes:      Pupils: Pupils are equal, round, and reactive to light.   Neck:      Vascular: No JVD.      Trachea: No tracheal deviation.   Cardiovascular:      Rate and Rhythm: Normal rate and regular rhythm.      Heart sounds: Normal heart sounds. No murmur heard.     No friction rub. No gallop.   Pulmonary:      Effort: Pulmonary effort is normal. No respiratory distress.      Breath sounds: Normal breath sounds. No wheezing or rales.   Abdominal:      General: Bowel sounds are normal. There is no distension.      Palpations: Abdomen is soft.      Tenderness: There is no abdominal tenderness. There is no guarding or rebound.   Skin:     General:  Skin is warm and dry.      Coloration: Skin is not pale.   Neurological:      Mental Status: He is alert and oriented to person, place, and time.      GCS: GCS eye subscore is 4. GCS verbal subscore is 4. GCS motor subscore is 6.      Cranial Nerves: No cranial nerve deficit.      Motor: No abnormal muscle tone.   Psychiatric:         Behavior: Behavior normal.         Vital Signs  ED Triage Vitals   Temperature Pulse Respirations Blood Pressure SpO2   08/18/24 2001 08/18/24 2001 08/18/24 2001 08/18/24 2001 08/18/24 2001   99.4 °F (37.4 °C) (!) 124 16 150/67 93 %      Temp Source Heart Rate Source Patient Position - Orthostatic VS BP Location FiO2 (%)   08/18/24 2001 08/18/24 2001 08/18/24 2001 08/18/24 2001 --   Temporal Monitor Sitting Right arm       Pain Score       08/18/24 2112       No Pain           Vitals:    08/18/24 2202 08/18/24 2232 08/18/24 2300 08/18/24 2331   BP: 156/72 (!) 177/87 164/72    Pulse: (!) 118 (!) 120 (!) 117    Patient Position - Orthostatic VS: Lying Lying Lying Lying         Visual Acuity      ED Medications  Medications   cyanocobalamin (VITAMIN B-12) tablet 500 mcg (has no administration in time range)   NON FORMULARY (has no administration in time range)   NON FORMULARY (has no administration in time range)   docusate sodium (COLACE) capsule 100 mg (has no administration in time range)   folic acid (FOLVITE) tablet 1 mg (has no administration in time range)   gabapentin (NEURONTIN) capsule 400 mg (has no administration in time range)   lacosamide (VIMPAT) tablet 150 mg (has no administration in time range)   lacosamide (VIMPAT) tablet 200 mg (has no administration in time range)   levETIRAcetam (KEPPRA) tablet 1,500 mg (has no administration in time range)   levothyroxine tablet 150 mcg (has no administration in time range)   lisinopril (ZESTRIL) tablet 5 mg (has no administration in time range)   loratadine (CLARITIN) tablet 10 mg (has no administration in time range)    multivitamin stress formula tablet 1 tablet (has no administration in time range)   polyethylene glycol (MIRALAX) packet 17 g (has no administration in time range)   QUEtiapine (SEROquel XR) 24 hr tablet 400 mg (has no administration in time range)   QUEtiapine (SEROquel) tablet 200 mg (has no administration in time range)   senna-docusate sodium (SENOKOT S) 8.6-50 mg per tablet 2 tablet (has no administration in time range)   pravastatin (PRAVACHOL) tablet 80 mg (has no administration in time range)   zonisamide (ZONEGRAN) capsule 300 mg (has no administration in time range)   temazepam (RESTORIL) capsule 15 mg (has no administration in time range)   lactated ringers infusion (75 mL/hr Intravenous New Bag 8/19/24 0006)   acetaminophen (TYLENOL) tablet 650 mg (has no administration in time range)   cefTRIAXone (ROCEPHIN) 2,000 mg in dextrose 5 % 50 mL IVPB (has no administration in time range)   enoxaparin (LOVENOX) subcutaneous injection 40 mg (has no administration in time range)   insulin lispro (HumALOG/ADMELOG) 100 units/mL subcutaneous injection 1-5 Units (has no administration in time range)   insulin lispro (HumALOG/ADMELOG) 100 units/mL subcutaneous injection 1-5 Units (has no administration in time range)   sodium chloride 0.9 % bolus 1,000 mL (0 mL Intravenous Stopped 8/18/24 2228)   sodium chloride 0.9 % bolus 1,000 mL (1,000 mL Intravenous New Bag 8/18/24 2228)   ceftriaxone (ROCEPHIN) 1 g/50 mL in dextrose IVPB (0 mg Intravenous Stopped 8/18/24 2303)   acetaminophen (Ofirmev) injection 1,000 mg (0 mg Intravenous Stopped 8/18/24 2340)       Diagnostic Studies  Results Reviewed       Procedure Component Value Units Date/Time    Lactic acid 2 Hours [[id number]]  (Normal) Collected: 08/18/24 2331    Lab Status: Final result Specimen: Blood from Arm, Left Updated: 08/18/24 2349     LACTIC ACID 1.5 mmol/L     Narrative:      Result may be elevated if tourniquet was used during collection.    Urine  Microscopic [[id number]]  (Abnormal) Collected: 08/18/24 2219    Lab Status: Final result Specimen: Urine, Other Updated: 08/18/24 2232     RBC, UA 10-20 /hpf      WBC, UA Innumerable /hpf      Epithelial Cells Occasional /hpf      Bacteria, UA Occasional /hpf     Urine culture [[id number]] Collected: 08/18/24 2219    Lab Status: In process Specimen: Urine, Other Updated: 08/18/24 2232    UA w Reflex to Microscopic w Reflex to Culture [[id number]]  (Abnormal) Collected: 08/18/24 2219    Lab Status: Final result Specimen: Urine, Other Updated: 08/18/24 2225     Color, UA Light Yellow     Clarity, UA Turbid     Specific Gravity, UA 1.014     pH, UA 7.5     Leukocytes, UA Large     Nitrite, UA Negative     Protein, UA Trace mg/dl      Glucose, UA Negative mg/dl      Ketones, UA Negative mg/dl      Urobilinogen, UA <2.0 mg/dl      Bilirubin, UA Negative     Occult Blood, UA Trace    FLU/RSV/COVID - if FLU/RSV clinically relevant [[id number]]  (Normal) Collected: 08/18/24 2029    Lab Status: Final result Specimen: Nares from Nose Updated: 08/18/24 2112     SARS-CoV-2 Negative     INFLUENZA A PCR Negative     INFLUENZA B PCR Negative     RSV PCR Negative    Narrative:      This test has been performed using the CoV-2/Flu/RSV plus assay on the [employer] GeneXpert platform. This test has been validated by the  and verified by the performing laboratory.     This test is designed to amplify and detect the following: nucleocapsid (N), envelope (E), and RNA-dependent RNA polymerase (RdRP) genes of the SARS-CoV-2 genome; matrix (M), basic polymerase (PB2), and acidic protein (PA) segments of the influenza A genome; matrix (M) and non-structural protein (NS) segments of the influenza B genome, and the nucleocapsid genes of RSV A and RSV B.     Positive results are indicative of the presence of Flu A, Flu B, RSV, and/or SARS-CoV-2 RNA. Positive results for SARS-CoV-2 or suspected novel influenza should be reported to state,  local, or federal health departments according to local reporting requirements.      All results should be assessed in conjunction with clinical presentation and other laboratory markers for clinical management.     FOR PEDIATRIC PATIENTS - copy/paste COVID Guidelines URL to browser: [email or web address]       Procalcitonin [[id number]]  (Abnormal) Collected: 08/18/24 2025    Lab Status: Final result Specimen: Blood from Arm, Left Updated: 08/18/24 2101     Procalcitonin 3.31 ng/ml     Manual Differential(PHLEBS Do Not Order) [[id number]]  (Abnormal) Collected: 08/18/24 2019    Lab Status: Final result Specimen: Blood from Arm, Right Updated: 08/18/24 2056     Segmented % 54 %      Bands % 7 %      Lymphocytes % 10 %      Monocytes % 26 %      Eosinophils % 1 %      Basophils % 1 %      Myelocytes % 1 %      Absolute Neutrophils 5.97 Thousand/uL      Absolute Lymphocytes 0.98 Thousand/uL      Absolute Monocytes 2.55 Thousand/uL      Absolute Eosinophils 0.10 Thousand/uL      Absolute Basophils 0.10 Thousand/uL      Absolute Myelocytes 0.10 Thousand/uL      Total Counted --     Platelet Estimate Adequate     Anisocytosis Present     Polychromasia Present    Lactic acid [[id number]]  (Abnormal) Collected: 08/18/24 2025    Lab Status: Final result Specimen: Blood from Arm, Left Updated: 08/18/24 2049     LACTIC ACID 2.6 mmol/L     Narrative:      Result may be elevated if tourniquet was used during collection.    Protime-INR [[id number]]  (Normal) Collected: 08/18/24 2025    Lab Status: Final result Specimen: Blood from Arm, Left Updated: 08/18/24 2046     Protime 13.2 seconds      INR 0.93    Narrative:      INR Therapeutic Range    Indication                                             INR Range      Atrial Fibrillation                                               2.0-3.0  Hypercoagulable State                                    2.0.2.3  Left Ventricular Asist Device                             2.0-3.0  Mechanical Heart Valve                                  -    Aortic(with afib, MI, embolism, HF, LA enlargement,    and/or coagulopathy)                                     2.0-3.0 (2.5-3.5)     Mitral                                                             2.5-3.5  Prosthetic/Bioprosthetic Heart Valve               2.0-3.0  Venous thromboembolism (VTE: VT, PE        2.0-3.0    APTT [[id number]]  (Normal) Collected: 08/18/24 2025    Lab Status: Final result Specimen: Blood from Arm, Left Updated: 08/18/24 2046     PTT 34 seconds     Beta Hydroxybutyrate [[id number]]  (Normal) Collected: 08/18/24 2019    Lab Status: Final result Specimen: Blood from Arm, Right Updated: 08/18/24 2041     Beta- Hydroxybutyrate <0.05 mmol/L     Comprehensive metabolic panel [[id number]]  (Abnormal) Collected: 08/18/24 2019    Lab Status: Final result Specimen: Blood from Arm, Right Updated: 08/18/24 2041     Sodium 133 mmol/L      Potassium 4.5 mmol/L      Chloride 100 mmol/L      CO2 23 mmol/L      ANION GAP 10 mmol/L      BUN 18 mg/dL      Creatinine 0.71 mg/dL      Glucose 265 mg/dL      Calcium 8.8 mg/dL      AST 18 U/L      ALT 13 U/L      Alkaline Phosphatase 56 U/L      Total Protein 7.0 g/dL      Albumin 3.6 g/dL      Total Bilirubin 0.26 mg/dL      eGFR 104 ml/min/1.73sq m     Narrative:      National Kid[employer] Disease Foundation guidelines for Chronic Kidney Disease (CKD):     Stage 1 with normal or high GFR (GFR > 90 mL/min/1.73 square meters)    Stage 2 Mild CKD (GFR = 60-89 mL/min/1.73 square meters)    Stage 3A Moderate CKD (GFR = 45-59 mL/min/1.73 square meters)    Stage 3B Moderate CKD (GFR = 30-44 mL/min/1.73 square meters)    Stage 4 Severe CKD (GFR = 15-29 mL/min/1.73 square meters)    Stage 5 End Stage CKD (GFR <15 mL/min/1.73 square meters)  Note: GFR calculation is accurate only with a steady state creatinine    Blood culture #1 [[id number]] Collected: 08/18/24 2025    Lab Status:  In process Specimen: Blood from Arm, Left Updated: 08/18/24 2031    CBC and differential [[id number]]  (Abnormal) Collected: 08/18/24 2019    Lab Status: Final result Specimen: Blood from Arm, Right Updated: 08/18/24 2027     WBC 9.79 Thousand/uL      RBC 3.67 Million/uL      Hemoglobin 9.9 g/dL      Hematocrit 31.0 %      MCV 85 fL      MCH 27.0 pg      MCHC 31.9 g/dL      RDW 15.2 %      MPV 11.3 fL      Platelets 266 Thousands/uL     Blood gas, venous [[id number]]  (Abnormal) Collected: 08/18/24 2019    Lab Status: Final result Specimen: Blood from Arm, Right Updated: 08/18/24 2024     pH, Raghavendra 7.456     pCO2, Raghavendra 35.3 mm Hg      pO2, Raghavendra 45.2 mm Hg      HCO3, Raghavendra 24.3 mmol/L      Base Excess, Raghavendra 0.7 mmol/L      O2 Content, Raghavendra 12.2 ml/dL      O2 HGB, VENOUS 79.1 %     Blood culture #2 [[id number]] Collected: 08/18/24 2019    Lab Status: In process Specimen: Blood from Arm, Right Updated: 08/18/24 2023    Fingerstick Glucose (POCT) [[id number]]  (Abnormal) Collected: 08/18/24 2002    Lab Status: Final result Specimen: Blood Updated: 08/18/24 2003     POC Glucose 260 mg/dl                    CT head without contrast   Final Result by Ashley West MD (08/18 2214)      No acute intracranial abnormality.                  Workstation performed: [id number]         XR chest 1 view portable    (Results Pending)              Procedures  Procedures         ED Course                                               Medical Decision Making  9-year-old male with hyperglycemia, chills, frequency, suspect urinary tract infection, will check infectious workup, give fluids, antibiotics, anticipate admission.    Amount and/or Complexity of Data Reviewed  Labs: ordered.  Radiology: ordered.    Risk  Prescription drug management.  Decision regarding hospitalization.                 Disposition  Final diagnoses:   UTI (urinary tract infection)     Time reflects when diagnosis was documented in both MDM as applicable and the Disposition  within this note       Time User Action Codes Description Comment    8/18/2024 11:23 PM Bill Guardado Add [N39.0] UTI (urinary tract infection)           ED Disposition       ED Disposition   Admit    Condition   Stable    Date/Time   Sun Aug 18, 2024 11:23 PM    Comment   Case was discussed with [facility] and the patient's admission status was agreed to be Admission Status: inpatient status to the service of Dr. Nguyen .               Follow-up Information    None         Current Discharge Medication List        CONTINUE these medications which have NOT CHANGED    Details   Blood Glucose Monitoring Suppl (OneTouch Verio Reflect) w/Device KIT Check blood sugars twice daily. Please substitute with appropriate alternative as covered by patient's insurance. Dx: E11.65  Qty: 1 kit, Refills: 0    Comments: Please substitute with appropriate alternative as covered by patient's insurance  Associated Diagnoses: Type 2 diabetes mellitus with diabetic neuropathy, without long-term current use of insulin (HCC)      cyanocobalamin (VITAMIN B-12) 500 MCG tablet Take 1 tablet (500 mcg total) by mouth daily  Qty: 30 tablet, Refills: 0    Associated Diagnoses: Metabolic encephalopathy      Depakote 500 MG DR tablet Take 1 tab in the morning and 2 tabs at night. Brand necessary  Qty: 90 tablet, Refills: 11    Associated Diagnoses: Generalized convulsive epilepsy (HCC)      Disposable Gloves (Safe-Sense Glove-Blk-Nitrl-L) MISC Use 1 each 3 (three) times a day as needed (care taker assisting with soiled briefs)  Qty: 100 each, Refills: 1    Associated Diagnoses: Behavior concern in adult; Urinary incontinence, unspecified type      docusate sodium (COLACE) 100 mg capsule Take 1 capsule (100 mg total) by mouth 2 (two) times a day  Qty: 180 capsule, Refills: 1    Associated Diagnoses: Constipation, unspecified constipation type      folic acid (FOLVITE) 1 mg tablet GIVE 1 TABLET BY MOUTH DAILY DX METABOLIC ENCEPHALOPATHY  Qty: 25 tablet,  Refills: 5    Associated Diagnoses: Metabolic encephalopathy      gabapentin (NEURONTIN) 400 mg capsule Take 1 capsule (400 mg total) by mouth 3 (three) times a day    Associated Diagnoses: Type 2 diabetes mellitus with diabetic neuropathy, without long-term current use of insulin (HCC)      !! glucose blood (OneTouch Verio) test strip Check blood sugars once daily. Please substitute with appropriate alternative as covered by patient's insurance. Dx: E11.65  Qty: 100 each, Refills: 3    Comments: Please substitute with appropriate alternative as covered by patient's insurance  Associated Diagnoses: Type 2 diabetes mellitus with diabetic neuropathy, without long-term current use of insulin (HCC)      !! glucose blood (OneTouch Verio) test strip Check blood sugars twice daily. Please substitute with appropriate alternative as covered by patient's insurance. Dx: E11.65  Qty: 200 each, Refills: 3    Comments: Please substitute with appropriate alternative as covered by patient's insurance  Associated Diagnoses: Type 2 diabetes mellitus with diabetic neuropathy, without long-term current use of insulin (HCC)      Incontinence Supply Disposable (Briefs Overnight Medium) MISC Use in the morning  Qty: 20 each, Refills: 11    Associated Diagnoses: Urinary incontinence, unspecified type      !! lacosamide (VIMPAT) 150 mg tablet Take 1 tab (150 mg) in the morning.  Qty: 30 tablet, Refills: 5    Associated Diagnoses: Generalized convulsive epilepsy (HCC)      !! lacosamide (VIMPAT) 200 mg tablet Take 1 tab (200 mg) at night.  Qty: 30 tablet, Refills: 5    Associated Diagnoses: Generalized convulsive epilepsy (HCC)      levETIRAcetam (KEPPRA) 750 mg tablet Give 2 tabs (1500 mg) by mouth twice a day  Qty: 120 tablet, Refills: 11    Associated Diagnoses: Involuntary movements      levothyroxine 150 mcg tablet Take 1 tablet (150 mcg total) by mouth daily  Qty: 90 tablet, Refills: 3    Associated Diagnoses: Hypothyroidism due to  Hashimoto's thyroiditis      lisinopril (ZESTRIL) 5 mg tablet Take 1 tablet (5 mg total) by mouth daily  Qty: 30 tablet, Refills: 0    Associated Diagnoses: Metabolic encephalopathy      loratadine (CLARITIN) 10 mg tablet Take 1 tablet (10 mg total) by mouth daily  Qty: 90 tablet, Refills: 3    Associated Diagnoses: Environmental and seasonal allergies      magnesium Oxide (MAG-OX) 400 mg TABS Take 1 tablet (400 mg total) by mouth 2 (two) times a day  Qty: 60 tablet, Refills: 0    Associated Diagnoses: Metabolic encephalopathy      metFORMIN (GLUCOPHAGE) 1000 MG tablet GIVE 1 TABLET BY MOUTH TWICE DAILY WITH MEALS FOR DIABETES  Qty: 60 tablet, Refills: 0    Associated Diagnoses: Diabetes mellitus due to underlying condition with hyperosmolarity without coma, without long-term current use of insulin (HCC)      Multiple Vitamin (Daily-Reina) TABS Take 1 tablet by mouth daily Take at 8 AM  Qty: 90 tablet, Refills: 11    Associated Diagnoses: Diabetes mellitus due to underlying condition with hyperosmolarity without coma, without long-term current use of insulin (HCC); Hyperlipidemia, unspecified hyperlipidemia type; Seizure (HCC); Neuropathy; Hypertension, unspecified type      Nutritional Supplements (Ensure Max Protein) LIQD Take 1 Bottle by mouth in the morning  Qty: 990 mL, Refills: 5    Associated Diagnoses: Increased nutritional needs      !! OneTouch Delica Lancets 33G MISC by Does not apply route daily TEST BLOOD SUGARS THREE TIMES DAILY  Qty: 100 each, Refills: 2    Associated Diagnoses: Diabetes mellitus due to underlying condition with hyperosmolarity without coma, without long-term current use of insulin (HCC)      !! OneTouch Delica Lancets 33G MISC Check blood sugars once daily. Please substitute with appropriate alternative as covered by patient's insurance. Dx: E11.65  Qty: 100 each, Refills: 3    Comments: Please substitute with appropriate alternative as covered by patient's insurance  Associated  Diagnoses: Type 2 diabetes mellitus with diabetic neuropathy, without long-term current use of insulin (HCC)      !! OneTouch Delica Lancets 33G MISC Check blood sugars twice daily. Please substitute with appropriate alternative as covered by patient's insurance. Dx: E11.65  Qty: 200 each, Refills: 3    Comments: Please substitute with appropriate alternative as covered by patient's insurance  Associated Diagnoses: Type 2 diabetes mellitus with diabetic neuropathy, without long-term current use of insulin (HCC)      polyethylene glycol (MIRALAX) 17 g packet Take 17 g by mouth daily for 14 days  Qty: 238 g, Refills: 0    Associated Diagnoses: Metabolic encephalopathy      QUEtiapine (SEROquel XR) 400 mg 24 hr tablet Take 1 tablet (400 mg total) by mouth daily at bedtime    Associated Diagnoses: Behavior concern in adult      QUEtiapine (SEROquel) 200 mg tablet Take 1 tablet (200 mg total) by mouth 2 (two) times a day before breakfast and lunch    Associated Diagnoses: Behavior concern in adult      senna-docusate sodium (SENOKOT S) 8.6-50 mg per tablet Take 2 tablets by mouth 2 (two) times a day  Qty: 120 tablet, Refills: 0    Associated Diagnoses: Metabolic encephalopathy      simvastatin (ZOCOR) 40 mg tablet GIVE 1 TABLET BY MOUTH AT BEDTIME FOR CHOLESTEROL  Qty: 30 tablet, Refills: 0    Associated Diagnoses: Hyperlipidemia, unspecified hyperlipidemia type      temazepam (RESTORIL) 30 mg capsule Take 1 capsule (30 mg total) by mouth daily at bedtime for 10 days  Qty: 10 capsule, Refills: 0    Associated Diagnoses: Behavior concern in adult      zonisamide (ZONEGRAN) 100 mg capsule Take 3 capsules (300 mg total) by mouth daily at bedtime  Qty: 90 capsule, Refills: 11    Associated Diagnoses: Behavior concern in adult       !! - Potential duplicate medications found. Please discuss with provider.          No discharge procedures on file.    PDMP Review         Value Time User    PDMP Reviewed  Yes 8/18/2024 11:43 PM  Mimi Ibrahim PA-C            ED Provider  Electronically Signed by             Bill Guardado MD  08/19/24 0034

## 2024-08-19 NOTE — ASSESSMENT & PLAN NOTE
During my encounter, patient was following commands intermittently  On examination, patient is found to have weakness on the right arm and leg  Immediately consulted neurology  CTA head and neck ordered to rule out stroke  Admitting CT head negative for stroke

## 2024-08-19 NOTE — ASSESSMENT & PLAN NOTE
Patient's caregiver reporting patient takes Depakote  mg in a.m. and 1000 mg nightly brand-name only  Did discuss with caregiver to bring in his home Depakote DR so that it can be given to patient as we unfortunately do not have brand-name in the pharmacy here, caregiver to bring in his Depakote tomorrow morning, nonformulary order placed already  Continue home Keppra 1500 mg every 12 hours, Vimpat twice daily, gabapentin 3 times daily, zonisamide nightly\  Today's update and plan:  Continue home meds PTA

## 2024-08-19 NOTE — ASSESSMENT & PLAN NOTE
Lab Results   Component Value Date    HGBA1C 6.7 (H) 06/14/2024       Recent Labs     08/18/24 2002   POCGLU 260*       Blood Sugar Average: Last 72 hrs:  (P) 260  Blood glucose checks 4 times daily, hypoglycemia protocol  Hold home metformin for now  ssi

## 2024-08-19 NOTE — PROGRESS NOTES
[facility]  Progress Note  Name: Jairon Oconnell I  MRN: [id number]  Unit/Bed#: -01 I Date of Admission: 8/18/2024   Date of Service: 8/19/2024 I Hospital Day: 1    Assessment & Plan   * Sepsis (HCC)  Assessment & Plan  Meeting criteria due to being febrile, tachycardic, tachypneic in the setting of a UTI  Procalcitonin mildly elevated, lactic acid initially elevated 2.6, will trend until WNL  Given 30 mg/kg IV fluid bolus in the ED, will continue IV fluid hydration  Started IV antibiotic   Today's update and plan:   mL bolus given as patient looks dehydrated, dry oral mucosa, tachycardia  Continue IV ceftriaxone 2 g every 24 hourly  Blood cultures positive for gram-negative rods pending culture sensitivity  Repeat blood cultures ordered  Will do CT chest abdomen pelvis with IV contrast to rule out the source of gram-negative bacteremia  Continue with IV fluids    Weakness  Assessment & Plan  During my encounter, patient was following commands intermittently  On examination, patient is found to have weakness on the right arm and leg  Immediately consulted neurology  CTA head and neck ordered to rule out stroke  Admitting CT head negative for stroke      UTI (urinary tract infection)  Assessment & Plan  Urinalysis positive for large leukocytes, occasional bacteria, caregiver reporting patient with increased urinary frequency and incontinence  Start IV ceftriaxone 2 g daily  Urine culture pending    Behavior concern in adult  Assessment & Plan  Caregiver reporting more lethargy over the last 2 days from baseline   Continue Seroquel 3 times daily, as needed temazepam, zonisamide  Fall precautions, delirium precautions, provide frequent verbal redirection    Generalized convulsive epilepsy (HCC)  Assessment & Plan  Patient's caregiver reporting patient takes Depakote  mg in a.m. and 1000 mg nightly brand-name only  Did discuss with caregiver to bring in his home Depakogerard ROBIN so  that it can be given to patient as we unfortunately do not have brand-name in the pharmacy here, caregiver to bring in his Depakote tomorrow morning, nonformulary order placed already  Continue home Keppra 1500 mg every 12 hours, Vimpat twice daily, gabapentin 3 times daily, zonisamide nightly\  Today's update and plan:  Continue home meds PTA    Type 2 diabetes mellitus with diabetic neuropathy, without long-term current use of insulin (HCC)  Assessment & Plan  Lab Results   Component Value Date    HGBA1C 6.7 (H) 06/14/2024       Recent Labs     08/18/24 2002 08/19/24  0011 08/19/24  0656 08/19/24  1112   POCGLU 260* 184* 170* 196*         Blood Sugar Average: Last 72 hrs:  (P) 202.5  Blood glucose checks 4 times daily, hypoglycemia protocol  Hold home metformin for now  ssi               VTE Pharmacologic Prophylaxis: VTE Score: 3 Moderate Risk (Score 3-4) - Pharmacological DVT Prophylaxis Ordered: enoxaparin (Lovenox).    Mobility:   Basic Mobility Inpatient Raw Score: 6  JH-HLM Goal: 2: Bed activities/Dependent transfer  JH-HLM Achieved: 2: Bed activities/Dependent transfer  JH-HLM Goal NOT achieved. Continue with multidisciplinary rounding and encourage appropriate mobility to improve upon JH-HLM goals.    Patient Centered Rounds: I performed bedside rounds with nursing staff today.   Discussions with Specialists or Other Care Team Provider: Neurology    Education and Discussions with Family / Patient: Updated  (caregiver) via phone.    Current Length of Stay: 1 day(s)  Current Patient Status: Inpatient   Certification Statement: The patient will continue to require additional inpatient hospital stay due to severe sepsis secondary to gram-negative bacteremia/workup for strokelike symptoms  Discharge Plan: Anticipate discharge in 48-72 hrs to discharge location to be determined pending rehab evaluations.    Code Status: Level 1 - Full Code    Subjective:   During my encounter, patient is following  commands intermittently.  Looks dehydrated  Limited due to patient confusion    Objective:     Vitals:   Temp (24hrs), Av °F (38.3 °C), Min:99.4 °F (37.4 °C), Max:103 °F (39.4 °C)    Temp:  [99.4 °F (37.4 °C)-103 °F (39.4 °C)] 99.5 °F (37.5 °C)  HR:  [103-124] 103  Resp:  [16-29] 18  BP: (133-177)/(58-87) 133/58  SpO2:  [92 %-96 %] 96 %  Body mass index is 23.88 kg/m².     Input and Output Summary (last 24 hours):     Intake/Output Summary (Last 24 hours) at 2024 1333  Last data filed at 2024 0944  Gross per 24 hour   Intake 200 ml   Output --   Net 200 ml       Physical Exam:   Constitutional: Ill-appearing, dehydrated  HEENT: Negative pallor or icterus  CVS: S1 plus S2  Respiratory: Normal vascular breathe without crackles and wheeze  Gastroenterology: Soft nontender without any palpable mass  Skin: No bruises or ecchymosis  Neurology: Confused, generalized weakness.  Decreased power on the right side appreciated    Additional Data:     Labs:  Results from last 7 days   Lab Units 24   WBC Thousand/uL 9.79   HEMOGLOBIN g/dL 9.9*   HEMATOCRIT % 31.0*   PLATELETS Thousands/uL 266   BANDS PCT % 7   LYMPHO PCT % 10*   MONO PCT % 26*   EOS PCT % 1     Results from last 7 days   Lab Units 24  0518 24   SODIUM mmol/L 136 133*   POTASSIUM mmol/L 4.1 4.5   CHLORIDE mmol/L 104 100   CO2 mmol/L 25 23   BUN mg/dL 15 18   CREATININE mg/dL 0.63 0.71   ANION GAP mmol/L 7 10   CALCIUM mg/dL 8.6 8.8   ALBUMIN g/dL  --  3.6   TOTAL BILIRUBIN mg/dL  --  0.26   ALK PHOS U/L  --  56   ALT U/L  --  13   AST U/L  --  18   GLUCOSE RANDOM mg/dL 136 265*     Results from last 7 days   Lab Units 24   INR  0.93     Results from last 7 days   Lab Units 24  1112 24  0656 24  0011 24   POC GLUCOSE mg/dl 196* 170* 184* 260*         Results from last 7 days   Lab Units 24  2331 24   LACTIC ACID mmol/L 1.5 2.6*   PROCALCITONIN ng/ml  --  3.31*        Lines/Drains:  Invasive Devices       Peripheral Intravenous Line  Duration             Peripheral IV 08/18/24 Distal;Left;Upper;Ventral (anterior) Antecubital <1 day                          Imaging: Personally reviewed the following imaging: CT head    Recent Cultures (last 7 days):   Results from last 7 days   Lab Units 08/18/24 2025 08/18/24 2019   BLOOD CULTURE   --  Received in Microbiology Lab. Culture in Progress.   GRAM STAIN RESULT  Gram negative rods*  --        Last 24 Hours Medication List:   Current Facility-Administered Medications   Medication Dose Route Frequency Provider Last Rate    cefTRIAXone  2,000 mg Intravenous Q24H Mimi Ibrahim PA-C 2,000 mg (08/19/24 0944)    cyanocobalamin  500 mcg Oral Daily Mimi Ibrahim PA-C      divalproex sodium  1,000 mg Oral HS Bhargav Barnes MD      [START ON 8/20/2024] divalproex sodium  500 mg Oral Daily Mimi Ibrahim PA-C      docusate sodium  100 mg Oral BID Mimi Ibrahim PA-C      enoxaparin  40 mg Subcutaneous Daily Mimi Ibrahim PA-C      folic acid  1 mg Oral Daily Mimi Ibrahim PA-C      gabapentin  400 mg Oral TID Mimi Ibrahim PA-C      insulin lispro  1-5 Units Subcutaneous TID AC Mimi Ibrahim PA-C      insulin lispro  1-5 Units Subcutaneous HS Mimi Ibrahim PA-C      lacosamide  150 mg Oral Daily Mimi Ibrahim PA-C      lacosamide  200 mg Oral HS Mimi Ibrahim PA-C      lactated ringers  75 mL/hr Intravenous Continuous Mimi Ibrahim PA-C 75 mL/hr (08/19/24 1129)    levETIRAcetam  1,500 mg Oral Q12H RONALD Mimi Ibrahim PA-C      levothyroxine  150 mcg Oral Early Morning Mimi Ibrahim PA-C      lisinopril  5 mg Oral Daily Mimi Ibrahim PA-C      loratadine  10 mg Oral Daily Mimi Ibrahim PA-C      multivitamin stress formula  1 tablet Oral Daily Mimi Ibrahim PA-C      polyethylene glycol  17 g Oral Daily PRN Mimi Ibrahim PA-C      pravastatin  80 mg Oral Daily With Dinner Mimi Ibrahim PA-C      QUEtiapine   400 mg Oral HS Mimi Ibrahim PA-C      QUEtiapine  200 mg Oral BID before breakfast/lunch Mimi Ibrahim PA-C      senna-docusate sodium  2 tablet Oral BID Mimi Ibrahim PA-C      temazepam  15 mg Oral HS PRN Mimi Ibrahim PA-C      zonisamide  300 mg Oral HS Mimi Ibrahim PA-C          Today, Patient Was Seen By: Bhargav Barnes MD    **Please Note: This note may have been constructed using a voice recognition system.**

## 2024-08-19 NOTE — CASE MANAGEMENT
Case Management Assessment & Discharge Planning Note    Patient name Jairon Oconnell  Location /-01 MRN [id number]  : 1965 Date 2024       Current Admission Date: 2024  Current Admission Diagnosis:Sepsis (Former[facility])   Patient Active Problem List    Diagnosis Date Noted Date Diagnosed    Weakness 2024     Sepsis ([facility]) 2024     UTI (urinary tract infection) 2024     Constipation 07/10/2024     History of pneumothorax 2024     DM (diabetes mellitus) (Formerly [facility]) 2024     History of cardiac arrest 2024     Dysphagia 2024     Closed traumatic fracture of ribs of right side with pneumothorax 2024     Urinary retention 2024     Nondisplaced fracture of greater trochanter of right femur, subsequent encounter for closed fracture with routine healing 2024     Closed nondisplaced fracture of proximal phalanx of left index finger with routine healing, subsequent encounter 10/30/2023     Pressure ulcer of sacral region, stage 3 ([facility]) 2023     Ambulatory dysfunction 2022     Social problem 2021     Hypomagnesemia 2021     Thrombocytopathia ([facility]) 2020     Hyponatremia 2019     Metabolic encephalopathy 2019     Seborrheic dermatitis of scalp 2019     Nearsightedness 2019     Behavior concern in adult 2019     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     Vitamin B12 deficiency 2017     HLD (hyperlipidemia) 2016     Vitamin D deficiency 2016     Type 2 diabetes mellitus with diabetic neuropathy, without long-term current use of insulin ([facility]) 06/15/2016     Acquired hypothyroidism 06/15/2016     Cataract 2013     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    Primary Insurance: MEDICARE  Secondary [place] [employer]    ASSESSMENT:  Active Health Care Proxies       Kemi Decker Health Care Representative - Sister Latanya Phone: [phone] (Home)                 Advance Directives  Does patient have a Health Care POA?: No  Was patient offered paperwork?: Yes (declined family working on it)  Does patient currently have a Health Care decision maker?: Yes, please see Health Care Proxy section  Does patient have Advance Directives?: No  Was patient offered paperwork?: Yes (declined family is working on it.  provided amaris with mobile [employer] phone number.)  Primary Contact: Sister Alena         Readmission Root Cause  30 Day Readmission: No    Patient Information  Admitted from:: Home  Mental Status: Other (Comment) (pt was not in room)  During Assessment patient was accompanied by: Other-Comment (amaris Huffman (Care Giver) [phone] (H))  Assessment information provided by:: Other - please comment (amaris Huffman (Care Giver) [phone] (H))  Primary Caregiver: Private caregiver  Caregiver's Name:: amaris Nathanael (Care Giver) [phone] (H)  Caregiver's Relationship to Patient:: Other (Specify) (pt care giver)  Caregiver's Telephone Number:: amaris Nathanael (Care Giver) [phone] (H)  Support Systems: Private Caregivers  County of Residence: [place]  What city do you live in[place]  Home entry access options. Select all that apply.: Ramp  Type of Current Residence: 2 story home  Upon entering residence, is there a bedroom on the main floor (no further steps)?: Yes  Upon entering residence,  is there a bathroom on the main floor (no further steps)?: Yes  Living Arrangements: Other (Comment) (private care givers)  Is patient a ?: No    Activities of Daily Living Prior to Admission  Functional Status: Assistance  Completes ADLs independently?: No  Level of ADL dependence: Assistance  Ambulates independently?: Yes  Does patient use assisted devices?: Yes  Assisted Devices (DME) used: Straight Cane, Walker, Bedside Commode, Wheelchair, Shower Chair  Does patient currently own DME?: Yes  What DME does the patient currently own?: Straight Cane, Walker, Bedside Commode, Shower Chair, Wheelchair  Does patient have a history of Outpatient Therapy (PT/OT)?: No  Does the patient have a history of Short-Term Rehab?: Yes  Does patient have a history of HHC?: Yes ([facility])  Does patient currently have HHC?: No         Patient Information Continued  Income Source: SSI/SSD  Does patient have prescription coverage?: Yes  Does patient receive dialysis treatments?: No  Does patient have a history of substance abuse?: No  Does patient have a history of Mental Health Diagnosis?: Yes (intermittent explosive disorder)  Is patient receiving treatment for mental health?: Yes  Has patient received inpatient treatment related to mental health in the last 2 years?: No    PHQ 2/9 Screening   Reviewed PHQ 2/9 Depression Screening Score?: No    Means of Transportation  Means of Transport to [facility] [place]:: Other (Comment) (Private care giver)      Social Determinants of Health (SDOH)      Flowsheet Row Most Recent Value   Housing Stability    In the last 12 months, was there a time when you were not able to pay the mortgage or rent on time? N   In the past 12 months, how many times have you moved where you were living? 0   At any time in the past 12 months, were you homeless or living in a shelter (including now)? N   Transportation Needs    In the past 12 months, has lack of transportation kept you from medical appointments or  from getting medications? no   In the past 12 months, has lack of transportation kept you from meetings, work, or from getting things needed for daily living? No   Food Insecurity    Within the past 12 months, you worried that your food would run out before you got the money to buy more. Never true   Within the past 12 months, the food you bought just didn't last and you didn't have money to get more. Never true   Utilities    In the past 12 months has the electric, gas, oil, or water company threatened to shut off services in your home? No            DISCHARGE DETAILS:    Discharge planning discussed with:: Care giver betty  Freedom of Choice: Yes  Comments - Freedom of Choice: CM discussed freedom of choice as it pertains to discharge planning. Betty declined rehab. Betty stated Pt would declined. Betty agreeable to hhc.  CM contacted family/caregiver?: Yes  Were Treatment Team discharge recommendations reviewed with patient/caregiver?: Yes  Did patient/caregiver verbalize understanding of patient care needs?: Yes  Were patient/caregiver advised of the risks associated with not following Treatment Team discharge recommendations?: Yes    Contacts  Patient Contacts: betty Huffman (Care Giver) [phone] (H) (CM called and left a  message for Kemi Decker (Sister) [phone] (H). Betty is who returned the phone call.)  Relationship to Patient:: Other (Comment) (care giver)  Contact Method: Phone  Phone Number: [phone]  Reason/Outcome: Continuity of Care, Emergency Contact, Referral, Discharge Planning    Requested Home Health Care         Is the patient interested in HHC at discharge?: Yes  Home Health Discipline requested:: Nursing, Occupational Therapy, Physical Therapy, Speech Language Pathology  Home Health Agency Name:: Other  Home Health Follow-Up Provider:: PCP  Home Health Services Needed:: Evaluate Functional Status and Safety, Strengthening/Theraputic Exercises to Improve  Function, Gait/ADL Training, Diabetes Management  Homebound Criteria Met:: Requires the Assistance of Another Person for Safe Ambulation or to Leave the Home, Uses an Assist Device (i.e. cane, walker, etc)  Supporting Clincal Findings:: Limited Endurance, Fatigues Easliy in Short Distances    DME Referral Provided  Referral made for DME?: No    Other Referral/Resources/Interventions Provided:  Interventions: C  Referral Comments: [facility] referral sent, preference is [facility]         Treatment Team Recommendation: Other (TBD)  Discharge Destination Plan:: Home with Home Health Care  Transport at Discharge : Family

## 2024-08-19 NOTE — ASSESSMENT & PLAN NOTE
Meeting criteria due to being febrile, tachycardic, tachypneic in the setting of a UTI  Procalcitonin mildly elevated, lactic acid initially elevated 2.6, will trend until WNL  Blood culture x 2 pending  Given 30 mg/kg IV fluid bolus in the ED, will continue IV fluid hydration  Start IV antibiotic

## 2024-08-19 NOTE — H&P
[facility]  H&P  Name: Jairon Oconnell 59 y.o. male I MRN: [id number]  Unit/Bed#: -01 I Date of Admission: 8/18/2024   Date of Service: 8/18/2024 I Hospital Day: 0      Assessment & Plan   * Sepsis (HCC)  Assessment & Plan  Meeting criteria due to being febrile, tachycardic, tachypneic in the setting of a UTI  Procalcitonin mildly elevated, lactic acid initially elevated 2.6, will trend until WNL  Blood culture x 2 pending  Given 30 mg/kg IV fluid bolus in the ED, will continue IV fluid hydration  Start IV antibiotic    UTI (urinary tract infection)  Assessment & Plan  Urinalysis positive for large leukocytes, occasional bacteria, caregiver reporting patient with increased urinary frequency and incontinence  Start IV ceftriaxone 2 g daily  Urine culture pending    Behavior concern in adult  Assessment & Plan  Caregiver reporting more lethargy over the last 2 days from baseline but currently patient is at his baseline in regards to behavior and mental status  Continue Seroquel 3 times daily, as needed temazepam, zonisamide  Fall precautions, delirium precautions, provide frequent verbal redirection    Generalized convulsive epilepsy (HCC)  Assessment & Plan  Patient's caregiver reporting patient takes Depakote  mg in a.m. and 1000 mg nightly brand-name only  Did discuss with caregiver to bring in his home Depakote DR so that it can be given to patient as we unfortunately do not have brand-name in the pharmacy here, caregiver to bring in his Depakote tomorrow morning, nonformulary order placed already  Continue home Keppra 1500 mg every 12 hours, Vimpat twice daily, gabapentin 3 times daily, zonisamide nightly    Type 2 diabetes mellitus with diabetic neuropathy, without long-term current use of insulin (HCC)  Assessment & Plan  Lab Results   Component Value Date    HGBA1C 6.7 (H) 06/14/2024       Recent Labs     08/18/24 2002   POCGLU 260*       Blood Sugar Average: Last 72  "hrs:  (P) 260  Blood glucose checks 4 times daily, hypoglycemia protocol  Hold home metformin for now  ssi         VTE Pharmacologic Prophylaxis: VTE Score: 3 Moderate Risk (Score 3-4) - Pharmacological DVT Prophylaxis Ordered: enoxaparin (Lovenox).  Code Status: Level 1 - Full Code   Discussion with family:  caregiver at bedside.     Anticipated Length of Stay: Patient will be admitted on an inpatient basis with an anticipated length of stay of greater than 2 midnights secondary to see above.    Total Time Spent on Date of Encounter in care of patient: 72 mins. This time was spent on one or more of the following: performing physical exam; counseling and coordination of care; obtaining or reviewing history; documenting in the medical record; reviewing/ordering tests, medications or procedures; communicating with other healthcare professionals and discussing with patient's family/caregivers.    Chief Complaint:    Chief Complaint   Patient presents with    Hyperglycemia - Symptomatic     Per caregiver at bedside pt has had BG >230 at home which is unusual for him. Caregiver admits pt has been experiencing shaking and increased fatigue today along with urinary incontinence over the past few days.         History of Present Illness:  Jairon Oconnell is a 59 y.o. male with a PMH of diabetes mellitus type 2, ataxic cerebral palsy, history of cardiac arrest, hypertension, hyperlipidemia, epilepsy who presents with complaint of sudden onset urinary frequency, incontinence, lethargy, increased blood sugar x 2 days.  Medical history provided by patient's caregiver.  Caregiver reports he noticed he has been more lethargic but seems to be waking up now after receiving fluids and antibiotics in the ED.  He also reports he was going to urinate so much that he was not making it to the bathroom over the last 2 days which is not like him and today he also appeared to be \"shaking.\"  Denies fevers, dysuria or any other complaints " from patient over the last 2 days.  Patient able to express currently he has no pain and feels okay.    Review of Systems:  Review of Systems   Unable to perform ROS: Other   Constitutional:  Negative for appetite change.   Musculoskeletal:  Negative for myalgias.       Past Medical and Surgical History:   Past Medical History:   Diagnosis Date    ADRIANA (acute kidney injury) ([facility]) 9/24/2019    Bacteremia due to Gram-positive bacteria 9/7/2021    Buttock wound, left, subsequent encounter 11/5/2021    COVID-19     CP (cerebral palsy) ([facility])     Depression     Diabetes ([facility])     Disease of thyroid gland     Gait disorder     Gluteal abscess 9/6/2021    Hyperlipidemia     Hypertension     Kidney failure     Kidney stones     Moderate protein-calorie malnutrition ([facility]) 12/22/2021    Osteoporosis     Pressure injury of left buttock, stage 4 ([facility]) 3/9/2022    Psychiatric disorder     Scoliosis     Seizures ([facility])     Sepsis ([facility]) 9/25/2019    Thyroid disease     UTI (urinary tract infection)        Past Surgical History:   Procedure Laterality Date    APPENDECTOMY      IR CHEST TUBE PLACEMENT  6/14/2024    IR PICC PLACEMENT SINGLE LUMEN  9/13/2021    IR PICC PLACEMENT SINGLE LUMEN  9/16/2021       Meds/Allergies:  Prior to Admission medications    Medication Sig Start Date End Date Taking? Authorizing Provider   Blood Glucose Monitoring Suppl (OneTouch Verio Reflect) w/Device KIT Check blood sugars twice daily. Please substitute with appropriate alternative as covered by patient's insurance. Dx: E11.65 8/1/24   HUNTER Brown   cyanocobalamin (VITAMIN B-12) 500 MCG tablet Take 1 tablet (500 mcg total) by mouth daily 6/24/24 8/1/24  HUNTER Vega   Depakote 500 MG DR tablet Take 1 tab in the morning and 2 tabs at night. Brand necessary 4/26/24   Keyur Banerjee MD   Disposable Gloves (Safe-Sense Glove-Blk-Nitrl-L) MISC Use 1 each 3 (three) times a day as needed (care taker assisting with soiled briefs) 7/26/23    HUNTER Brown   docusate sodium (COLACE) 100 mg capsule Take 1 capsule (100 mg total) by mouth 2 (two) times a day 5/29/24   HUNTER Brown   folic acid (FOLVITE) 1 mg tablet GIVE 1 TABLET BY MOUTH DAILY DX METABOLIC ENCEPHALOPATHY 8/18/24   HUTNER Brown   gabapentin (NEURONTIN) 400 mg capsule Take 1 capsule (400 mg total) by mouth 3 (three) times a day 2/22/24   HUNTER Babcock   glucose blood (OneTouch Verio) test strip Check blood sugars once daily. Please substitute with appropriate alternative as covered by patient's insurance. Dx: E11.65 5/22/24   Calvin Yost DO   glucose blood (OneTouch Verio) test strip Check blood sugars twice daily. Please substitute with appropriate alternative as covered by patient's insurance. Dx: E11.65 8/1/24   HUNTER Brown   Incontinence Supply Disposable (Briefs Overnight Medium) MISC Use in the morning  Patient not taking: Reported on 8/1/2024 7/26/23   HUNTER Brown   lacosamide (VIMPAT) 150 mg tablet Take 1 tab (150 mg) in the morning. 4/26/24   Keyur Banerjee MD   lacosamide (VIMPAT) 200 mg tablet Take 1 tab (200 mg) at night. 4/26/24   Keyur Banerjee MD   levETIRAcetam (KEPPRA) 750 mg tablet Give 2 tabs (1500 mg) by mouth twice a day 4/26/24   Keyur Banerjee MD   levothyroxine 150 mcg tablet Take 1 tablet (150 mcg total) by mouth daily 6/28/24   Calvin Yost DO   lisinopril (ZESTRIL) 5 mg tablet Take 1 tablet (5 mg total) by mouth daily 6/21/24   Amol Patel MD   loratadine (CLARITIN) 10 mg tablet Take 1 tablet (10 mg total) by mouth daily 4/8/24   HUNTER Brown   magnesium Oxide (MAG-OX) 400 mg TABS Take 1 tablet (400 mg total) by mouth 2 (two) times a day 6/24/24 8/1/24  Maryuri L Irasema, CRNP   metFORMIN (GLUCOPHAGE) 1000 MG tablet GIVE 1 TABLET BY MOUTH TWICE DAILY WITH MEALS FOR DIABETES 8/12/24   Calvin WAGNER'Neill, DO   Multiple Vitamin (Daily-Reina) TABS Take 1 tablet by mouth daily Take at 8 AM 4/3/23    HUNTER Brown   Nutritional Supplements (Ensure Max Protein) LIQD Take 1 Bottle by mouth in the morning 6/28/24 12/25/24  Calvin Yost, DO   OneTouch Delica Lancets 33G MISC by Does not apply route daily TEST BLOOD SUGARS THREE TIMES DAILY  Patient not taking: Reported on 8/1/2024 7/31/20   HUNTER Fitzgerald   OneTouch Delica Lancets 33G MISC Check blood sugars once daily. Please substitute with appropriate alternative as covered by patient's insurance. Dx: E11.65 5/22/24   Calvin Yost, DO   OneTouch Delica Lancets 33G MISC Check blood sugars twice daily. Please substitute with appropriate alternative as covered by patient's insurance. Dx: E11.65 8/1/24   HUNTER Brown   polyethylene glycol (MIRALAX) 17 g packet Take 17 g by mouth daily for 14 days 6/24/24 8/1/24  HUNTER Vega   QUEtiapine (SEROquel XR) 400 mg 24 hr tablet Take 1 tablet (400 mg total) by mouth daily at bedtime 6/20/24   Amol Patel MD   QUEtiapine (SEROquel) 200 mg tablet Take 1 tablet (200 mg total) by mouth 2 (two) times a day before breakfast and lunch 8/1/24   HUNTER Brown   senna-docusate sodium (SENOKOT S) 8.6-50 mg per tablet Take 2 tablets by mouth 2 (two) times a day 6/24/24 8/1/24  HUNTER Vega   simvastatin (ZOCOR) 40 mg tablet GIVE 1 TABLET BY MOUTH AT BEDTIME FOR CHOLESTEROL 8/12/24 9/11/24  Calvin Yost DO   temazepam (RESTORIL) 30 mg capsule Take 1 capsule (30 mg total) by mouth daily at bedtime for 10 days  Patient taking differently: Take 15 mg by mouth daily at bedtime as needed for sleep 4/1/24 8/1/24  HUNTER Main   zonisamide (ZONEGRAN) 100 mg capsule Take 3 capsules (300 mg total) by mouth daily at bedtime 4/26/24   Keyur Banerjee MD   folic acid (FOLVITE) 1 mg tablet Take 1 tablet (1 mg total) by mouth daily 8/1/24 8/18/24  HUNTER Brown     I have reviewed home medications per review of chart and PDMP and partially with caregiver.     Allergies:    Allergies   Allergen Reactions    Depakote [Valproic Acid] Other (See Comments) and Seizures     Must have brand name Depakote. Off brand Depakote will induce seizures PER caregiver.    Erythromycin Other (See Comments)     Unknown per pt    Penicillins Other (See Comments)     Unknown per pt       Social History:  Marital Status: Single       Patient Pre-hospital Level of Mobility: unable to be assessed at time of evaluation  Patient Pre-hospital Diet Restrictions: Purée diet  Substance Use History:   Social History     Substance and Sexual Activity   Alcohol Use Never     Social History     Tobacco Use   Smoking Status Never    Passive exposure: Never   Smokeless Tobacco Never     Social History     Substance and Sexual Activity   Drug Use No       Family History:  History reviewed. No pertinent family history.    Physical Exam:     Vitals:   Blood Pressure: 164/72 (08/18/24 2300)  Pulse: (!) 117 (08/18/24 2300)  Temperature: (!) 103 °F (39.4 °C) (08/18/24 2232)  Temp Source: Axillary (08/18/24 2232)  Respirations: 22 (08/18/24 2300)  SpO2: 92 % (08/18/24 2300)    Physical Exam  Vitals and nursing note reviewed.   Constitutional:       General: He is not in acute distress.     Appearance: He is not toxic-appearing.   HENT:      Head: Normocephalic.   Cardiovascular:      Rate and Rhythm: Regular rhythm. Tachycardia present.   Pulmonary:      Effort: Pulmonary effort is normal. No respiratory distress.      Breath sounds: Normal breath sounds.   Abdominal:      General: Abdomen is flat. Bowel sounds are normal. There is no distension.      Palpations: Abdomen is soft.      Tenderness: There is no abdominal tenderness.   Musculoskeletal:      Right lower leg: No edema.      Left lower leg: No edema.   Skin:     General: Skin is warm and dry.   Neurological:      General: No focal deficit present.      Mental Status: He is alert. Mental status is at baseline.      Comments: Patient unable to provide any medical  history at baseline/cognitive impairment at baseline   Psychiatric:         Behavior: Behavior normal.          Additional Data:     Lab Results:  Results from last 7 days   Lab Units 08/18/24 2019   WBC Thousand/uL 9.79   HEMOGLOBIN g/dL 9.9*   HEMATOCRIT % 31.0*   PLATELETS Thousands/uL 266   BANDS PCT % 7   LYMPHO PCT % 10*   MONO PCT % 26*   EOS PCT % 1     Results from last 7 days   Lab Units 08/18/24 2019   SODIUM mmol/L 133*   POTASSIUM mmol/L 4.5   CHLORIDE mmol/L 100   CO2 mmol/L 23   BUN mg/dL 18   CREATININE mg/dL 0.71   ANION GAP mmol/L 10   CALCIUM mg/dL 8.8   ALBUMIN g/dL 3.6   TOTAL BILIRUBIN mg/dL 0.26   ALK PHOS U/L 56   ALT U/L 13   AST U/L 18   GLUCOSE RANDOM mg/dL 265*     Results from last 7 days   Lab Units 08/18/24 2025   INR  0.93     Results from last 7 days   Lab Units 08/18/24 2002   POC GLUCOSE mg/dl 260*     Lab Results   Component Value Date    HGBA1C 6.7 (H) 06/14/2024    HGBA1C 5.5 11/27/2023    HGBA1C 5.6 03/22/2023     Results from last 7 days   Lab Units 08/18/24  2331 08/18/24 2025   LACTIC ACID mmol/L 1.5 2.6*   PROCALCITONIN ng/ml  --  3.31*       Lines/Drains:  Invasive Devices       Peripheral Intravenous Line  Duration             Peripheral IV 08/18/24 Distal;Left;Upper;Ventral (anterior) Antecubital <1 day                        Imaging: Reviewed radiology reports from this admission including: CT head and Personally reviewed the following imaging: chest xray  CT head without contrast   Final Result by Ashley West MD (08/18 2214)      No acute intracranial abnormality.                  Workstation performed: [id number]         XR chest 1 view portable    (Results Pending)       EKG and Other Studies Reviewed on Admission:   EKG: Personally Reviewed.  Prolonged QTc, sinus tachycardia, right bundle branch block.    ** Please Note: This note has been constructed using a voice recognition system. **

## 2024-08-19 NOTE — ASSESSMENT & PLAN NOTE
Patient follows in the outpatient epilepsy clinic with Dr. Banerjee with last appointment on 4/26/2024.  Per that last note, patient has history of generalized convulsive epilepsy, well controlled.  He will have occasional myoclonus, but has not had any larger seizures.  He is maintained on:  Zonisamide 300 mg nightly  Lacosamide 150 mg in the morning and 200 mg at night  Levetiracetam 1500 mg twice a day  Depakote (Brand) 500 mg in the morning and 1000 mg at night

## 2024-08-19 NOTE — ASSESSMENT & PLAN NOTE
Patient's caregiver reporting patient takes Depakote  mg in a.m. and 1000 mg nightly brand-name only  Did discuss with caregiver to bring in his home Depakote DR so that it can be given to patient as we unfortunately do not have brand-name in the pharmacy here, caregiver to bring in his Depakote tomorrow morning, nonformulary order placed already  Continue home Keppra 1500 mg every 12 hours, Vimpat twice daily, gabapentin 3 times daily, zonisamide nightly

## 2024-08-19 NOTE — ASSESSMENT & PLAN NOTE
Caregiver reporting more lethargy over the last 2 days from baseline   Continue Seroquel 3 times daily, as needed temazepam, zonisamide  Fall precautions, delirium precautions, provide frequent verbal redirection

## 2024-08-19 NOTE — CONSULTS
Consultation - Infectious Disease   Jairon Oconnell 59 y.o. male MRN: [id number]  Unit/Bed#: -01 Encounter: [id number]    IMPRESSION & RECOMMENDATIONS:   Sepsis. E/b fever, tachycardia. Also with lactic acidosis and elevated procalcitonin. Suspect source is #2 and #3. Patient remains febrile, but fever curve appears to be downtrending. Has remained without leukocytosis.   Continue antibiotic as below  Follow-up blood culture sensitivities  Follow-up CT chest, abdomen, pelvis  Continue to monitor temperature/vitals  Continue to follow CBCD and CMP with AM labs to monitor for potential antimicrobial toxicities and assess for clinical response to antibiotics.      E.coli bacteremia. 1 out of 2 sets positive on admission. No resistance gene detected. Suspect likely urinary source given presenting urinary symptoms, but also agree with obtaining a CT chest, abdomen, and pelvis to assess for other potential occult source of infection given ongoing fevers. Has previously grown E.coli from Ucx and has history of E.coli bacteremia in 2021. Cultures at that time show sensitivity to cefuroxime.   Continue IV ceftriaxone 2g daily for now  Follow-up E.coli sensitivities  Will adjust antibiotics as needed based on sensitivities  Follow-up CT chest, abdomen, pelvis to assess for other occult source of infection    UTI. P/w urinary frequency, incontinence, and lethargy. Found to have #2. UA demonstrated innumerable WBC, large leukocytes, and trace blood. Ucx pending.   Continue antibiotics as above  Follow-up urine culture  Will adjust antibiotic based on culture data    T2DM. Most recent A1C 6.7 (6/2024). Well controlled. Remains a risk factor for development of infection and poor wound healing.   Recommend ongoing tight glycemic control     History of ataxic cerebral palsy and epilepsy. Patient is on Depakote, Keppra, and Vimpat in the outpatient setting. Will check for potential DDI with any antibiotic.     Penicillin  allergy. Reaction unknown per patient. Unclear if true allergy. Will avoid for now. Has tolerated IV ceftriaxone.   Monitor for any potential reactions    Above plan was discussed in detail with patient at bedside.   Above plan was discussed in detail with primary service, who agrees with the plan to continue IV ceftriaxone for now, follow-up urine culture, pan-scan, and sensitivities.    HISTORY OF PRESENT ILLNESS:  Reason for Consult: 1. UTI 2. Sepsis  HPI: Jairon Oconnell is a 59 y.o. year old male with pmhx of T2DM, ataxic cerebral palsy, HTN, HLD, hx of cardiac arrest, epilepsy, who presented to [facility] with urinary frequency, incontinence, lethargy, and hyperglycemia x 2 days. Upon arrival to the ED, patient was febrile to 103F and tachycardic. Labs demonstrated a normal WBC to 9.79, hyponatremia to 133, hyperglycemia to 265. Lactic acid was elevated to 2.6, and procalcitonin was elevated to 3.31. Patient underwent a CT head which showed no acute intracranial abnormalities. A CT chest, abdomen, and pelvis is ordered. A UA was obtained which had innumerable WBC, large leukocytes, and trace blood. Ucx is pending. Blood cultures obtained on admission are positive 1 out of 2 sets for GNRs identified as E.coli. No resistance genes detected. Patient started on IV ceftriaxone. Continues to be febrile. ID is consulted for antibiotic recommendations.     Discussion with the patient was limited due to baseline mental status. Patient's caregiver and sister were at bedside. Most of history was obtained via patient's caregiver. She states that he was having urinary incontinence for the past couple days. Also states that he was having shaking chills. No nausea, vomiting, or diarrhea. No respiratory symptoms. Patient denies having any pain currently and denies abdominal pain on exam. Tolerating IV ceftriaxone without difficulty. No rashes.     REVIEW OF SYSTEMS:  A complete 12 point system-based review of  systems is otherwise negative.    PAST MEDICAL HISTORY:  Past Medical History:   Diagnosis Date    ADRIANA (acute kidney injury) ([facility]) 2019    Bacteremia due to Gram-positive bacteria 2021    Buttock wound, left, subsequent encounter 2021    COVID-19     CP (cerebral palsy) ([facility])     Depression     Diabetes ([facility])     Disease of thyroid gland     Gait disorder     Gluteal abscess 2021    Hyperlipidemia     Hypertension     Kidney failure     Kidney stones     Moderate protein-calorie malnutrition ([facility]) 2021    Osteoporosis     Pressure injury of left buttock, stage 4 ([facility]) 3/9/2022    Psychiatric disorder     Scoliosis     Seizures ([facility])     Sepsis ([facility]) 2019    Thyroid disease     UTI (urinary tract infection)      Past Surgical History:   Procedure Laterality Date    APPENDECTOMY      IR CHEST TUBE PLACEMENT  2024    IR PICC PLACEMENT SINGLE LUMEN  2021    IR PICC PLACEMENT SINGLE LUMEN  2021       FAMILY HISTORY:  Non-contributory    SOCIAL HISTORY:  Social History   Social History     Substance and Sexual Activity   Alcohol Use Never     Social History     Substance and Sexual Activity   Drug Use No     Social History     Tobacco Use   Smoking Status Never    Passive exposure: Never   Smokeless Tobacco Never       ALLERGIES:  Allergies   Allergen Reactions    Depakote [Valproic Acid] Other (See Comments) and Seizures     Must have brand name Depakote. Off brand Depakote will induce seizures PER caregiver.    Erythromycin Other (See Comments)     Unknown per pt    Penicillins Other (See Comments)     Unknown per pt       MEDICATIONS:  All current active medications have been reviewed.    ANTIBIOTICS:  CTX    PHYSICAL EXAM:  Temp:  [99.4 °F (37.4 °C)-103 °F (39.4 °C)] 99.5 °F (37.5 °C)  HR:  [103-124] 103  Resp:  [16-29] 18  BP: (133-177)/(58-87) 133/58  SpO2:  [92 %-96 %] 96 %  Temp (24hrs), Av °F (38.3 °C), Min:99.4 °F (37.4 °C), Max:103 °F (39.4 °C)  Current:  Temperature: 99.5 °F (37.5 °C)    Intake/Output Summary (Last 24 hours) at 8/19/2024 1333  Last data filed at 8/19/2024 0944  Gross per 24 hour   Intake 200 ml   Output --   Net 200 ml       General Appearance:  Chronically ill appearing, laying comfortably in bed, nontoxic, and in no distress.   Head:  Normocephalic, without obvious abnormality, atraumatic.   Eyes:  Conjunctiva pink and sclera anicteric, both eyes. Right subconjunctival hemorrhage.    Nose: Nares normal, mucosa normal, no drainage.   Throat: Oropharynx moist without lesions. No perioral lesions or ulcerations.    Neck: Supple, symmetrical, no adenopathy, no tenderness/mass/nodules.   Back:   Unable to assess    Lungs:   Clear to auscultation bilaterally, respirations unlabored. On room air.    Chest Wall:  No tenderness or deformity.   Heart:  RRR; no murmur, rub or gallop.   Abdomen:   Soft, non-tender, non-distended, positive bowel sounds throughout. No suprapubic tenderness.    Extremities: No cyanosis or clubbing, no edema.   Skin: No rashes or lesions. No draining wounds noted.   Neurologic: Awake, answers some questions, otherwise has dysarthria and is difficult to understand. Follows some commands.      LABS, IMAGING, & OTHER STUDIES:  Lab Results:  I have personally reviewed pertinent labs.  Results from last 7 days   Lab Units 08/18/24 2019   WBC Thousand/uL 9.79   HEMOGLOBIN g/dL 9.9*   PLATELETS Thousands/uL 266     Results from last 7 days   Lab Units 08/19/24 0518 08/18/24 2019   POTASSIUM mmol/L 4.1 4.5   CHLORIDE mmol/L 104 100   CO2 mmol/L 25 23   BUN mg/dL 15 18   CREATININE mg/dL 0.63 0.71   EGFR ml/min/1.73sq m 109 104   CALCIUM mg/dL 8.6 8.8   AST U/L  --  18   ALT U/L  --  13   ALK PHOS U/L  --  56     Results from last 7 days   Lab Units 08/18/24 2025 08/18/24 2019   BLOOD CULTURE   --  Received in Microbiology Lab. Culture in Progress.   GRAM STAIN RESULT  Gram negative rods*  --      Results from last 7 days   Lab Units  08/18/24 2025   PROCALCITONIN ng/ml 3.31*                   Imaging Studies:   I have personally reviewed pertinent imaging study reports and images in PACS.    8/18 CT head: no acute intracranial abnormality    Other Studies:   I have personally reviewed pertinent reports.        Lila Goodman PA-C  [facility]

## 2024-08-19 NOTE — PLAN OF CARE
Problem: INFECTION - ADULT  Goal: Absence or prevention of progression during hospitalization  Description: INTERVENTIONS:  - Assess and monitor for signs and symptoms of infection  - Monitor lab/diagnostic results  - Monitor all insertion sites, i.e. indwelling lines, tubes, and drains  - Monitor endotracheal if appropriate and nasal secretions for changes in amount and color  - [place] appropriate cooling/warming therapies per order  - Administer medications as ordered  - Instruct and encourage patient and family to use good hand hygiene technique  - Identify and instruct in appropriate isolation precautions for identified infection/condition  Outcome: Progressing

## 2024-08-19 NOTE — ASSESSMENT & PLAN NOTE
Meeting criteria due to being febrile, tachycardic, tachypneic in the setting of a UTI  Procalcitonin mildly elevated, lactic acid initially elevated 2.6, will trend until WNL  Given 30 mg/kg IV fluid bolus in the ED, will continue IV fluid hydration  Started IV antibiotic   Today's update and plan:   mL bolus given as patient looks dehydrated, dry oral mucosa, tachycardia  Continue IV ceftriaxone 2 g every 24 hourly  Blood cultures positive for gram-negative rods pending culture sensitivity  Repeat blood cultures ordered  Will do CT chest abdomen pelvis with IV contrast to rule out the source of gram-negative bacteremia  Continue with IV fluids

## 2024-08-19 NOTE — CONSULTS
Consultation - Neurology   Jairon Oconnell 59 y.o. male MRN: [id number]  Unit/Bed#: -01 Encounter: [id number]      Assessment & Plan     Weakness  Assessment & Plan  59 y.o. male with cerebral palsy, generalized convulsive epilepsy (on Depakote, lacosamide, Keppra, and zonisamide), behavioral disturbances, history of cardiac arrest, HTN, HLD, DM2, and hypothyroidism who presented to [facility] on 8/18/2024 due to elevated blood sugar (>230 at home), urinary frequency, incontinence, and lethargy for the past 2 days.    Upon arrival to the ED, /67 with max .  HR 120s and febrile with Tmax 103 °F.    Labs revealed glucose 265, procalcitonin 3.31, lactic acid 2.6, UA positive for leukocytes/blood, and 1 of 2 blood cultures positive for gram-negative rods.  Patient was started on ceftriaxone.  CT head negative for acute intracranial abnormalities.      Neurology was consulted on 8/19 due to generalized weakness (R>L).  On exam, patient has generalized weakness and is lethargic, but antigravity BUEs with bilateral asterixis.  Brisk withdrawal to noxious stimuli in all extremities and movements appear symmetric.    Etiology for generalized weakness likely due to UTI.  Low suspicion for stroke/TIA, but cannot fully rule out.    Plan:  - Will hold off on formal stroke pathway until MRI is completed.  - MRI brain wo contrast pending  - Start aspirin 81 mg daily  - Will defer statin therapy at this time  - Lipid panel and A1C pending  - PT/OT  - ID following for UTI; currently on ceftriaxone  - Frequent neuro checks. Continue to monitor and notify neurology with any changes.   - Medical management and supportive care per primary team. Correction of any metabolic or infectious disturbances    Generalized convulsive epilepsy (HCC)  Assessment & Plan  Patient follows in the outpatient epilepsy clinic with Dr. Banerjee with last appointment on 4/26/2024.  Per that last note, patient has history of generalized  "convulsive epilepsy, well controlled.  He will have occasional myoclonus, but has not had any larger seizures.  He is maintained on:  Zonisamide 300 mg nightly  Lacosamide 150 mg in the morning and 200 mg at night  Levetiracetam 1500 mg twice a day  Depakote (Brand) 500 mg in the morning and 1000 mg at night       Recommendations for outpatient neurological follow up have yet to be determined.    History of Present Illness     Reason for Consult / Principal Problem: R sided weakness  Hx and PE limited by: AMS  HPI: Jairon Oconnell is a 59 y.o. male with cerebral palsy, generalized convulsive epilepsy (on Depakote, lacosamide, Keppra, and zonisamide), behavioral disturbances, history of cardiac arrest, HTN, HLD, DM2, and hypothyroidism who presented to [facility] on 8/18/2024 due to elevated blood sugar (>230 at home), urinary frequency, incontinence, and lethargy for the past 2 days.    History obtained per chart review and patient's caregiver at bedside.  Over the past few days, patient has been more lethargic with increased urinary frequency, often not making it to the restroom in time and being incontinent.  He has had generalized weakness.  His blood sugar was >200 at home, prompting further evaluation in the ED.    Upon arrival to the ED, /67 with max .  HR 120s and febrile with Tmax 103 °F.  Labs revealed glucose 265, procalcitonin 3.31, lactic acid 2.6, UA positive for leukocytes/blood, and 1 of 2 blood cultures positive for gram-negative rods.  CT head negative for acute intracranial abnormalities.  Patient was started on ceftriaxone.    Neurology was consulted on 8/19 due to generalized weakness (R>L), that appeared new today compared to yesterday.  On exam, patient is initially sleeping in bed, but quickly arouses to verbal stimuli.  He shakes his head \"no\" when asked if anything is bothering him.  At baseline, patient is oriented to self and place only.  He can state his needs, but has " difficulty holding full conversations.  He is able to feed himself and walk without assistance.  He does need assistance with showering and occasionally with dressing.      Prior Neuro History:  Patient follows in the outpatient epilepsy clinic with Dr. Banerjee with last appointment on 4/26/2024.  Per that last note, patient has generalized convulsive epilepsy.  He will have occasional myoclonus, but has not had any larger seizures.  He is maintained on:  Zonisamide 300 mg nightly  Lacosamide 150 mg in the morning and 200 mg at night  Levetiracetam 1500 mg twice a day  Depakote (Brand) 500 mg in the morning and 1000 mg at night   He was previously well-controlled on Depakote and zonisamide, and plan was to wean off of Keppra and lacosamide at some point, but due to falls, he has been unable to be weaned off of these other AEDs.  In February 2024, patient had a mechanical fall with hip fracture.    Inpatient consult to Neurology  Consult performed by: Naty Holt PA-C  Consult ordered by: Bhargav Barnes MD          Review of Systems   Unable to perform ROS: Mental status change       Historical Information   Past Medical History:   Diagnosis Date    ADRIANA (acute kidney injury) ([facility]) 9/24/2019    Bacteremia due to Gram-positive bacteria 9/7/2021    Buttock wound, left, subsequent encounter 11/5/2021    COVID-19     CP (cerebral palsy) ([facility])     Depression     Diabetes ([facility])     Disease of thyroid gland     Gait disorder     Gluteal abscess 9/6/2021    Hyperlipidemia     Hypertension     Kidney failure     Kidney stones     Moderate protein-calorie malnutrition ([facility]) 12/22/2021    Osteoporosis     Pressure injury of left buttock, stage 4 ([facility]) 3/9/2022    Psychiatric disorder     Scoliosis     Seizures ([facility])     Sepsis ([facility]) 9/25/2019    Thyroid disease     UTI (urinary tract infection)      Past Surgical History:   Procedure Laterality Date    APPENDECTOMY      IR CHEST TUBE PLACEMENT  6/14/2024    IR PICC  PLACEMENT SINGLE LUMEN  9/13/2021    IR PICC PLACEMENT SINGLE LUMEN  9/16/2021     Social History   Social History     Substance and Sexual Activity   Alcohol Use Never     Social History     Substance and Sexual Activity   Drug Use No     E-Cigarette/Vaping    E-Cigarette Use Never User      E-Cigarette/Vaping Substances    Nicotine No     THC No     CBD No     Flavoring No     Other No     Unknown No      Social History     Tobacco Use   Smoking Status Never    Passive exposure: Never   Smokeless Tobacco Never     Family History: History reviewed. No pertinent family history.    Review of previous medical records was completed. Reviewed prior notes, labs, CT head    Meds/Allergies   all current active meds have been reviewed, current meds:   Current Facility-Administered Medications   Medication Dose Route Frequency    cefTRIAXone (ROCEPHIN) 2,000 mg in dextrose 5 % 50 mL IVPB  2,000 mg Intravenous Q24H    cyanocobalamin (VITAMIN B-12) tablet 500 mcg  500 mcg Oral Daily    divalproex sodium (DEPAKOTE ER) 24 hr tablet 1,000 mg  1,000 mg Oral HS    [START ON 8/20/2024] divalproex sodium (DEPAKOTE ER) 24 hr tablet 500 mg  500 mg Oral Daily    docusate sodium (COLACE) capsule 100 mg  100 mg Oral BID    enoxaparin (LOVENOX) subcutaneous injection 40 mg  40 mg Subcutaneous Daily    folic acid (FOLVITE) tablet 1 mg  1 mg Oral Daily    gabapentin (NEURONTIN) capsule 400 mg  400 mg Oral TID    insulin lispro (HumALOG/ADMELOG) 100 units/mL subcutaneous injection 1-5 Units  1-5 Units Subcutaneous TID AC    insulin lispro (HumALOG/ADMELOG) 100 units/mL subcutaneous injection 1-5 Units  1-5 Units Subcutaneous HS    lacosamide (VIMPAT) tablet 150 mg  150 mg Oral Daily    lacosamide (VIMPAT) tablet 200 mg  200 mg Oral HS    lactated ringers infusion  75 mL/hr Intravenous Continuous    levETIRAcetam (KEPPRA) tablet 1,500 mg  1,500 mg Oral Q12H RONALD    levothyroxine tablet 150 mcg  150 mcg Oral Early Morning    lisinopril (ZESTRIL)  tablet 5 mg  5 mg Oral Daily    loratadine (CLARITIN) tablet 10 mg  10 mg Oral Daily    multivitamin stress formula tablet 1 tablet  1 tablet Oral Daily    polyethylene glycol (MIRALAX) packet 17 g  17 g Oral Daily PRN    pravastatin (PRAVACHOL) tablet 80 mg  80 mg Oral Daily With Dinner    QUEtiapine (SEROquel XR) 24 hr tablet 400 mg  400 mg Oral HS    QUEtiapine (SEROquel) tablet 200 mg  200 mg Oral BID before breakfast/lunch    senna-docusate sodium (SENOKOT S) 8.6-50 mg per tablet 2 tablet  2 tablet Oral BID    temazepam (RESTORIL) capsule 15 mg  15 mg Oral HS PRN    zonisamide (ZONEGRAN) capsule 300 mg  300 mg Oral HS   , and PTA meds:   Prior to Admission Medications   Prescriptions Last Dose Informant Patient Reported? Taking?   Blood Glucose Monitoring Suppl (OneTouch Verio Reflect) w/Device KIT   No No   Sig: Check blood sugars twice daily. Please substitute with appropriate alternative as covered by patient's insurance. Dx: E11.65   Depakote 500 MG DR tablet   No No   Sig: Take 1 tab in the morning and 2 tabs at night. Brand necessary   Disposable Gloves (Safe-Sense Glove-Blk-Nitrl-L) MISC  Care Giver No No   Sig: Use 1 each 3 (three) times a day as needed (care taker assisting with soiled briefs)   Incontinence Supply Disposable (Briefs Overnight Medium) MISC  Care Giver No No   Sig: Use in the morning   Patient not taking: Reported on 8/1/2024   Multiple Vitamin (Daily-Reina) TABS  Care Giver No No   Sig: Take 1 tablet by mouth daily Take at 8 AM   Nutritional Supplements (Ensure Max Protein) LIQD   No No   Sig: Take 1 Bottle by mouth in the morning   OneTouch Delica Lancets 33G MISC  Care Giver No No   Sig: by Does not apply route daily TEST BLOOD SUGARS THREE TIMES DAILY   Patient not taking: Reported on 8/1/2024   OneTouch Delica Lancets 33G MISC   No No   Sig: Check blood sugars once daily. Please substitute with appropriate alternative as covered by patient's insurance. Dx: E11.65   OneTouch Delica  Lancets 33G MISC   No No   Sig: Check blood sugars twice daily. Please substitute with appropriate alternative as covered by patient's insurance. Dx: E11.65   QUEtiapine (SEROquel XR) 400 mg 24 hr tablet   No No   Sig: Take 1 tablet (400 mg total) by mouth daily at bedtime   QUEtiapine (SEROquel) 200 mg tablet   No No   Sig: Take 1 tablet (200 mg total) by mouth 2 (two) times a day before breakfast and lunch   cyanocobalamin (VITAMIN B-12) 500 MCG tablet   No No   Sig: Take 1 tablet (500 mcg total) by mouth daily   docusate sodium (COLACE) 100 mg capsule   No No   Sig: Take 1 capsule (100 mg total) by mouth 2 (two) times a day   folic acid (FOLVITE) 1 mg tablet   No No   Sig: GIVE 1 TABLET BY MOUTH DAILY DX METABOLIC ENCEPHALOPATHY   gabapentin (NEURONTIN) 400 mg capsule  Care Giver No No   Sig: Take 1 capsule (400 mg total) by mouth 3 (three) times a day   glucose blood (OneTouch Verio) test strip   No No   Sig: Check blood sugars once daily. Please substitute with appropriate alternative as covered by patient's insurance. Dx: E11.65   glucose blood (OneTouch Verio) test strip   No No   Sig: Check blood sugars twice daily. Please substitute with appropriate alternative as covered by patient's insurance. Dx: E11.65   lacosamide (VIMPAT) 150 mg tablet   No No   Sig: Take 1 tab (150 mg) in the morning.   lacosamide (VIMPAT) 200 mg tablet   No No   Sig: Take 1 tab (200 mg) at night.   levETIRAcetam (KEPPRA) 750 mg tablet   No No   Sig: Give 2 tabs (1500 mg) by mouth twice a day   levothyroxine 150 mcg tablet   No No   Sig: Take 1 tablet (150 mcg total) by mouth daily   lisinopril (ZESTRIL) 5 mg tablet   No No   Sig: Take 1 tablet (5 mg total) by mouth daily   loratadine (CLARITIN) 10 mg tablet  Care Giver No No   Sig: Take 1 tablet (10 mg total) by mouth daily   magnesium Oxide (MAG-OX) 400 mg TABS   No No   Sig: Take 1 tablet (400 mg total) by mouth 2 (two) times a day   metFORMIN (GLUCOPHAGE) 1000 MG tablet   No No    Sig: GIVE 1 TABLET BY MOUTH TWICE DAILY WITH MEALS FOR DIABETES   polyethylene glycol (MIRALAX) 17 g packet   No No   Sig: Take 17 g by mouth daily for 14 days   senna-docusate sodium (SENOKOT S) 8.6-50 mg per tablet   No No   Sig: Take 2 tablets by mouth 2 (two) times a day   simvastatin (ZOCOR) 40 mg tablet   No No   Sig: GIVE 1 TABLET BY MOUTH AT BEDTIME FOR CHOLESTEROL   temazepam (RESTORIL) 30 mg capsule  Care Giver No No   Sig: Take 1 capsule (30 mg total) by mouth daily at bedtime for 10 days   Patient taking differently: Take 15 mg by mouth daily at bedtime as needed for sleep   zonisamide (ZONEGRAN) 100 mg capsule   No No   Sig: Take 3 capsules (300 mg total) by mouth daily at bedtime      Facility-Administered Medications: None       Allergies   Allergen Reactions    Depakote [Valproic Acid] Other (See Comments) and Seizures     Must have brand name Depakote. Off brand Depakote will induce seizures PER caregiver.    Erythromycin Other (See Comments)     Unknown per pt    Penicillins Other (See Comments)     Unknown per pt       Objective   Vitals:Blood pressure 133/58, pulse 103, temperature 99.5 °F (37.5 °C), resp. rate 18, height 6' (1.829 m), weight 79.9 kg (176 lb 1.6 oz), SpO2 96%.,Body mass index is 23.88 kg/m².    Intake/Output Summary (Last 24 hours) at 8/19/2024 1320  Last data filed at 8/19/2024 0944  Gross per 24 hour   Intake 200 ml   Output --   Net 200 ml       Invasive Devices:   Invasive Devices       Peripheral Intravenous Line  Duration             Peripheral IV 08/18/24 Distal;Left;Upper;Ventral (anterior) Antecubital <1 day                    Physical Exam  Vitals and nursing note reviewed.   Constitutional:       Comments: Patient initially sleeping in bed but quickly arouses to verbal and tactile stimuli   HENT:      Head: Normocephalic and atraumatic.      Mouth/Throat:      Mouth: Mucous membranes are moist.      Pharynx: Oropharynx is clear.   Eyes:      Extraocular Movements:  Extraocular movements intact.      Conjunctiva/sclera: Conjunctivae normal.      Pupils: Pupils are equal, round, and reactive to light.      Comments: Subconjunctival hemorrhage in R eye   Pulmonary:      Effort: Pulmonary effort is normal.   Musculoskeletal:      Comments: Generalized weakness   Skin:     General: Skin is warm and dry.   Neurological:      Mental Status: He is alert and oriented to person, place, and time.      Deep Tendon Reflexes:      Reflex Scores:       Bicep reflexes are 1+ on the right side and 1+ on the left side.       Brachioradialis reflexes are 1+ on the right side and 1+ on the left side.       Patellar reflexes are 1+ on the right side and 1+ on the left side.       Achilles reflexes are 0 on the right side and 0 on the left side.     Comments: See full neuro exam below       Neurologic Exam     Mental Status   Oriented to person, place, and time.   Patient initially sleeping, but quickly arouses to verbal and tactile stimuli  Remains lethargic throughout the exam  Able to state his name and that he is in a hospital.  Could not state month or year.  Severe dysarthria and poor enunciation (speech is at baseline per care giver at bedside)  Paucity of speech  Able to follow simple midline and some appendicular commands, but difficulty with multistep commands     Cranial Nerves     CN III, IV, VI   Pupils are equal, round, and reactive to light.  Pupils 3 mm, round, reactive to light bilaterally.  Able to track examiner left and right  Blink to threat intact bilaterally  Difficulty assessing facial sensation  Facial expressions full and symmetric.  Tongue midline.  Hearing grossly intact.     Motor Exam   Able to briefly lift both arms antigravity with quick negative myoclonus/asterixis with arms falling back to bed.  Weak  bilaterally  No spontaneous movement in BLEs, however brisk knee flexion/withdrawal to noxious stimuli bilaterally  Movements appear symmetric     Sensory Exam  "  Brisk withdrawal to noxious stimuli in all extremities.  Patient yells out in pain     Gait, Coordination, and Reflexes     Gait  Gait: (deferred for patient's safety)    Reflexes   Right brachioradialis: 1+  Left brachioradialis: 1+  Right biceps: 1+  Left biceps: 1+  Right patellar: 1+  Left patellar: 1+  Right achilles: 0  Left achilles: 0  Patient did not participate with coordination testing  No resting tremor  Asterixis in BUEs  No ankle clonus bilaterally       Lab Results: I have personally reviewed pertinent reports.  , CBC:   Results from last 7 days   Lab Units 08/18/24 2019   WBC Thousand/uL 9.79   RBC Million/uL 3.67*   HEMOGLOBIN g/dL 9.9*   HEMATOCRIT % 31.0*   MCV fL 85   PLATELETS Thousands/uL 266   , BMP/CMP:   Results from last 7 days   Lab Units 08/19/24 0518 08/18/24 2019   SODIUM mmol/L 136 133*   POTASSIUM mmol/L 4.1 4.5   CHLORIDE mmol/L 104 100   CO2 mmol/L 25 23   BUN mg/dL 15 18   CREATININE mg/dL 0.63 0.71   CALCIUM mg/dL 8.6 8.8   AST U/L  --  18   ALT U/L  --  13   ALK PHOS U/L  --  56   EGFR ml/min/1.73sq m 109 104   , Vitamin B12:   , HgBA1C:   , TSH:   , Coagulation:   Results from last 7 days   Lab Units 08/18/24 2025   INR  0.93   , Lipid Profile:   , Ammonia:   , Urinalysis:   Results from last 7 days   Lab Units 08/18/24  2219   COLOR UA  Light Yellow   CLARITY UA  Turbid   SPEC GRAV UA  1.014   PH UA  7.5   LEUKOCYTES UA  Large*   NITRITE UA  Negative   GLUCOSE UA mg/dl Negative   KETONES UA mg/dl Negative   BILIRUBIN UA  Negative   BLOOD UA  Trace*   , Drug Screen:   , Medication Drug Levels:       Invalid input(s): \"CARBAMAZEPINE\", \"OXCARBAZEPINE\"  Imaging Studies: I have personally reviewed pertinent reports.   and I have personally reviewed pertinent films in PACS  EKG, Pathology, and Other Studies: I have personally reviewed pertinent reports.   and I have personally reviewed pertinent films in PACS  VTE Prophylaxis: Sequential compression device (Venodyne)  and " Enoxaparin (Lovenox)    Code Status: Level 1 - Full Code  Advance Directive and Living Will:      Power of :    POLST:

## 2024-08-19 NOTE — ASSESSMENT & PLAN NOTE
59 y.o. male with cerebral palsy, generalized convulsive epilepsy (on Depakote, lacosamide, Keppra, and zonisamide), behavioral disturbances, history of cardiac arrest, HTN, HLD, DM2, and hypothyroidism who presented to [facility] on 8/18/2024 due to elevated blood sugar (>230 at home), urinary frequency, incontinence, and lethargy for the past 2 days.    Upon arrival to the ED, /67 with max .  HR 120s and febrile with Tmax 103 °F.    Labs revealed glucose 265, procalcitonin 3.31, lactic acid 2.6, UA positive for leukocytes/blood, and 1 of 2 blood cultures positive for gram-negative rods.  Patient was started on ceftriaxone.  CT head negative for acute intracranial abnormalities.      Neurology was consulted on 8/19 due to generalized weakness (R>L).  On exam, patient has generalized weakness and is lethargic, but antigravity BUEs with bilateral asterixis.  Brisk withdrawal to noxious stimuli in all extremities and movements appear symmetric.    Etiology for generalized weakness likely due to UTI.  Low suspicion for stroke/TIA, but cannot fully rule out.    Plan:  - Will hold off on formal stroke pathway until MRI is completed.  - MRI brain wo contrast pending  - Start aspirin 81 mg daily  - Will defer statin therapy at this time  - Lipid panel and A1C pending  - PT/OT  - ID following for UTI; currently on ceftriaxone  - Frequent neuro checks. Continue to monitor and notify neurology with any changes.   - Medical management and supportive care per primary team. Correction of any metabolic or infectious disturbances

## 2024-08-19 NOTE — ASSESSMENT & PLAN NOTE
Lab Results   Component Value Date    HGBA1C 6.7 (H) 06/14/2024       Recent Labs     08/18/24 2002 08/19/24  0011 08/19/24  0656 08/19/24  1112   POCGLU 260* 184* 170* 196*         Blood Sugar Average: Last 72 hrs:  (P) 202.5  Blood glucose checks 4 times daily, hypoglycemia protocol  Hold home metformin for now  ssi

## 2024-08-19 NOTE — ASSESSMENT & PLAN NOTE
Urinalysis positive for large leukocytes, occasional bacteria, caregiver reporting patient with increased urinary frequency and incontinence  Start IV ceftriaxone 2 g daily  Urine culture pending

## 2024-08-20 ENCOUNTER — APPOINTMENT (INPATIENT)
Dept: MRI IMAGING | Facility: HOSPITAL | Age: 59
DRG: 872 | End: 2024-08-20
Payer: MEDICARE

## 2024-08-20 LAB
ANION GAP SERPL CALCULATED.3IONS-SCNC: 8 MMOL/L (ref 4–13)
BUN SERPL-MCNC: 16 MG/DL (ref 5–25)
CALCIUM SERPL-MCNC: 8.3 MG/DL (ref 8.4–10.2)
CHLORIDE SERPL-SCNC: 104 MMOL/L (ref 96–108)
CHOLEST SERPL-MCNC: 138 MG/DL
CO2 SERPL-SCNC: 25 MMOL/L (ref 21–32)
CREAT SERPL-MCNC: 0.72 MG/DL (ref 0.6–1.3)
ERYTHROCYTE [DISTWIDTH] IN BLOOD BY AUTOMATED COUNT: 15.2 % (ref 11.6–15.1)
EST. AVERAGE GLUCOSE BLD GHB EST-MCNC: 137 MG/DL
GFR SERPL CREATININE-BSD FRML MDRD: 103 ML/MIN/1.73SQ M
GLUCOSE SERPL-MCNC: 137 MG/DL (ref 65–140)
GLUCOSE SERPL-MCNC: 155 MG/DL (ref 65–140)
GLUCOSE SERPL-MCNC: 185 MG/DL (ref 65–140)
GLUCOSE SERPL-MCNC: 186 MG/DL (ref 65–140)
GLUCOSE SERPL-MCNC: 188 MG/DL (ref 65–140)
HBA1C MFR BLD: 6.4 %
HCT VFR BLD AUTO: 27.6 % (ref 36.5–49.3)
HDLC SERPL-MCNC: 28 MG/DL
HGB BLD-MCNC: 8.8 G/DL (ref 12–17)
LDLC SERPL CALC-MCNC: 87 MG/DL (ref 0–100)
MCH RBC QN AUTO: 26.9 PG (ref 26.8–34.3)
MCHC RBC AUTO-ENTMCNC: 31.9 G/DL (ref 31.4–37.4)
MCV RBC AUTO: 84 FL (ref 82–98)
NONHDLC SERPL-MCNC: 110 MG/DL
PLATELET # BLD AUTO: 247 THOUSANDS/UL (ref 149–390)
PMV BLD AUTO: 10 FL (ref 8.9–12.7)
POTASSIUM SERPL-SCNC: 3.8 MMOL/L (ref 3.5–5.3)
RBC # BLD AUTO: 3.27 MILLION/UL (ref 3.88–5.62)
SODIUM SERPL-SCNC: 137 MMOL/L (ref 135–147)
TRIGL SERPL-MCNC: 115 MG/DL
WBC # BLD AUTO: 8.36 THOUSAND/UL (ref 4.31–10.16)

## 2024-08-20 PROCEDURE — 80048 BASIC METABOLIC PNL TOTAL CA: CPT | Performed by: STUDENT IN AN ORGANIZED HEALTH CARE EDUCATION/TRAINING PROGRAM

## 2024-08-20 PROCEDURE — 82948 REAGENT STRIP/BLOOD GLUCOSE: CPT

## 2024-08-20 PROCEDURE — 70551 MRI BRAIN STEM W/O DYE: CPT

## 2024-08-20 PROCEDURE — 85027 COMPLETE CBC AUTOMATED: CPT | Performed by: STUDENT IN AN ORGANIZED HEALTH CARE EDUCATION/TRAINING PROGRAM

## 2024-08-20 PROCEDURE — 99233 SBSQ HOSP IP/OBS HIGH 50: CPT | Performed by: STUDENT IN AN ORGANIZED HEALTH CARE EDUCATION/TRAINING PROGRAM

## 2024-08-20 PROCEDURE — 83036 HEMOGLOBIN GLYCOSYLATED A1C: CPT | Performed by: PHYSICIAN ASSISTANT

## 2024-08-20 PROCEDURE — 80061 LIPID PANEL: CPT | Performed by: PHYSICIAN ASSISTANT

## 2024-08-20 PROCEDURE — 99233 SBSQ HOSP IP/OBS HIGH 50: CPT | Performed by: INTERNAL MEDICINE

## 2024-08-20 RX ORDER — ACETAMINOPHEN 10 MG/ML
1000 INJECTION, SOLUTION INTRAVENOUS ONCE
Status: COMPLETED | OUTPATIENT
Start: 2024-08-20 | End: 2024-08-20

## 2024-08-20 RX ORDER — SODIUM CHLORIDE, SODIUM GLUCONATE, SODIUM ACETATE, POTASSIUM CHLORIDE, MAGNESIUM CHLORIDE, SODIUM PHOSPHATE, DIBASIC, AND POTASSIUM PHOSPHATE .53; .5; .37; .037; .03; .012; .00082 G/100ML; G/100ML; G/100ML; G/100ML; G/100ML; G/100ML; G/100ML
50 INJECTION, SOLUTION INTRAVENOUS CONTINUOUS
Status: DISCONTINUED | OUTPATIENT
Start: 2024-08-20 | End: 2024-08-21

## 2024-08-20 RX ORDER — HALOPERIDOL 5 MG/ML
2 INJECTION INTRAMUSCULAR ONCE
Status: DISCONTINUED | OUTPATIENT
Start: 2024-08-20 | End: 2024-08-20

## 2024-08-20 RX ORDER — OLANZAPINE 10 MG/2ML
5 INJECTION, POWDER, FOR SOLUTION INTRAMUSCULAR ONCE
Status: DISCONTINUED | OUTPATIENT
Start: 2024-08-20 | End: 2024-08-21

## 2024-08-20 RX ADMIN — B-COMPLEX W/ C & FOLIC ACID TAB 1 TABLET: TAB at 08:38

## 2024-08-20 RX ADMIN — QUETIAPINE 400 MG: 200 TABLET, FILM COATED, EXTENDED RELEASE ORAL at 22:58

## 2024-08-20 RX ADMIN — SODIUM CHLORIDE, SODIUM GLUCONATE, SODIUM ACETATE, POTASSIUM CHLORIDE, MAGNESIUM CHLORIDE, SODIUM PHOSPHATE, DIBASIC, AND POTASSIUM PHOSPHATE 50 ML/HR: .53; .5; .37; .037; .03; .012; .00082 INJECTION, SOLUTION INTRAVENOUS at 16:06

## 2024-08-20 RX ADMIN — LEVOTHYROXINE SODIUM 150 MCG: 0.15 TABLET ORAL at 06:38

## 2024-08-20 RX ADMIN — GABAPENTIN 400 MG: 400 CAPSULE ORAL at 16:07

## 2024-08-20 RX ADMIN — LEVETIRACETAM 1500 MG: 500 TABLET, FILM COATED ORAL at 22:54

## 2024-08-20 RX ADMIN — QUETIAPINE FUMARATE 200 MG: 100 TABLET ORAL at 12:00

## 2024-08-20 RX ADMIN — INSULIN LISPRO 1 UNITS: 100 INJECTION, SOLUTION INTRAVENOUS; SUBCUTANEOUS at 23:07

## 2024-08-20 RX ADMIN — GABAPENTIN 400 MG: 400 CAPSULE ORAL at 08:38

## 2024-08-20 RX ADMIN — ZONISAMIDE 300 MG: 100 CAPSULE ORAL at 22:58

## 2024-08-20 RX ADMIN — DIVALPROEX SODIUM 500 MG: 500 TABLET, EXTENDED RELEASE ORAL at 08:41

## 2024-08-20 RX ADMIN — LACOSAMIDE 200 MG: 100 TABLET, FILM COATED ORAL at 22:54

## 2024-08-20 RX ADMIN — DOCUSATE SODIUM 100 MG: 100 CAPSULE, LIQUID FILLED ORAL at 16:07

## 2024-08-20 RX ADMIN — OLANZAPINE 5 MG: 2.5 TABLET, FILM COATED ORAL at 16:39

## 2024-08-20 RX ADMIN — QUETIAPINE FUMARATE 200 MG: 100 TABLET ORAL at 06:38

## 2024-08-20 RX ADMIN — ACETAMINOPHEN 1000 MG: 10 INJECTION INTRAVENOUS at 09:18

## 2024-08-20 RX ADMIN — INSULIN LISPRO 1 UNITS: 100 INJECTION, SOLUTION INTRAVENOUS; SUBCUTANEOUS at 12:00

## 2024-08-20 RX ADMIN — PRAVASTATIN SODIUM 80 MG: 80 TABLET ORAL at 16:07

## 2024-08-20 RX ADMIN — LEVETIRACETAM 1500 MG: 500 TABLET, FILM COATED ORAL at 08:39

## 2024-08-20 RX ADMIN — ENOXAPARIN SODIUM 40 MG: 40 INJECTION SUBCUTANEOUS at 08:38

## 2024-08-20 RX ADMIN — ASPIRIN 81 MG: 81 TABLET, CHEWABLE ORAL at 08:38

## 2024-08-20 RX ADMIN — CEFTRIAXONE SODIUM 2000 MG: 10 INJECTION, POWDER, FOR SOLUTION INTRAVENOUS at 09:30

## 2024-08-20 RX ADMIN — LACOSAMIDE 150 MG: 100 TABLET, FILM COATED ORAL at 08:38

## 2024-08-20 RX ADMIN — CYANOCOBALAMIN TAB 500 MCG 500 MCG: 500 TAB at 08:38

## 2024-08-20 RX ADMIN — GABAPENTIN 400 MG: 400 CAPSULE ORAL at 22:54

## 2024-08-20 RX ADMIN — LISINOPRIL 5 MG: 5 TABLET ORAL at 08:38

## 2024-08-20 RX ADMIN — DIVALPROEX SODIUM 1000 MG: 500 TABLET, EXTENDED RELEASE ORAL at 22:58

## 2024-08-20 RX ADMIN — INSULIN LISPRO 1 UNITS: 100 INJECTION, SOLUTION INTRAVENOUS; SUBCUTANEOUS at 16:40

## 2024-08-20 RX ADMIN — SENNOSIDES AND DOCUSATE SODIUM 2 TABLET: 50; 8.6 TABLET ORAL at 16:07

## 2024-08-20 RX ADMIN — FOLIC ACID 1 MG: 1 TABLET ORAL at 08:39

## 2024-08-20 RX ADMIN — SENNOSIDES AND DOCUSATE SODIUM 2 TABLET: 50; 8.6 TABLET ORAL at 08:39

## 2024-08-20 RX ADMIN — LORATADINE 10 MG: 10 TABLET ORAL at 08:39

## 2024-08-20 RX ADMIN — DOCUSATE SODIUM 100 MG: 100 CAPSULE, LIQUID FILLED ORAL at 08:39

## 2024-08-20 RX ADMIN — SODIUM CHLORIDE 500 ML: 0.9 INJECTION, SOLUTION INTRAVENOUS at 12:00
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
## 2024-08-20 NOTE — PROGRESS NOTES
Progress Note - Infectious Disease   Jairon Oconnell 59 y.o. male MRN: [id number]  Unit/Bed#: -01 Encounter: [id number]      Impression/Plan:  Sepsis. E/b fever, tachycardia. Also with lactic acidosis and elevated procalcitonin. Suspect source is #2 and #3. CT C/A/P showed no definite intra-abdominal abnormalities. However, it did show nonspecific left kidney hypodensities. Patient remains febrile, but fever curve appears to be downtrending. Has remained without leukocytosis.   Continue antibiotic as below  Follow-up blood culture sensitivities  Continue to monitor temperature/vitals  Continue to follow CBCD and CMP with AM labs to monitor for potential antimicrobial toxicities and assess for clinical response to antibiotics.      E.coli bacteremia. 1 out of 2 sets positive on admission. No resistance gene detected. Suspect urinary source given presenting urinary symptoms and Ucx also positive for E.coli. CT chest, abdomen, and pelvis did not show any definite intra-abdominal abnormalities. Has previously grown E.coli from Ucx and has history of E.coli bacteremia in 2021. Cultures at that time show sensitivity to cefuroxime.   Continue IV ceftriaxone 2g daily for now  Follow-up E.coli sensitivities  Will adjust antibiotics as needed based on sensitivities     UTI. P/w urinary frequency, incontinence, and lethargy. Found to have #2. UA demonstrated innumerable WBC, large leukocytes, and trace blood. Ucx resulted positive for E.coli as well. CT A/P showed no acute intra-abdominal disease and stable non-obstructing left renal stone. No hydronephrosis. Did show tiny hypodensities of left mid kidney too small to characterize. If patient continues to fever, may need to repeat CT A/P w/ contrast to re-evaluate these hypodensities for development of renal abscess.   Continue antibiotics as above  Follow-up urine culture sensitivities  Will adjust antibiotic based on culture data  Continue to monitor fever  curve/vitals  If patient continues to fever, may need to repeat CT A/P w/ contrast to re-assess renal hypodensities. Hold off for now.      T2DM. Most recent A1C 6.7 (2024). Well controlled. Remains a risk factor for development of infection and poor wound healing.   Recommend ongoing tight glycemic control      History of ataxic cerebral palsy and epilepsy. Patient is on Depakote, Keppra, and Vimpat in the outpatient setting. Will check for potential DDI with any antibiotic.      Penicillin allergy. Reaction unknown per patient. Unclear if true allergy. Will avoid for now. Has tolerated IV ceftriaxone.   Monitor for any potential reactions    Above plan was discussed in detail with patient at bedside.   Above plan was discussed in detail with primary service attending, who agrees with the plan to continue IV ceftriaxone and follow-up blood cultures and urine culture sensitivities.    Antibiotics:  Ceftriaxone D3    Subjective:  Patient has ongoing fevers and chills. Denies having any current pain. Exam and interaction limited due to patient's mental status. Patient asks when he is going home. Tolerating IV ceftriaxone without development of rashes.     Objective:  Vitals:  Temp:  [98.3 °F (36.8 °C)-101.2 °F (38.4 °C)] 98.3 °F (36.8 °C)  HR:  [104-114] 111  Resp:  [16] 16  BP: (118-141)/(61-74) 118/61  SpO2:  [89 %-96 %] 91 %  Temp (24hrs), Av.6 °F (37.6 °C), Min:98.3 °F (36.8 °C), Max:101.2 °F (38.4 °C)  Current: Temperature: 98.3 °F (36.8 °C)    Physical Exam:   General Appearance:  Chronically ill appearing, debilitated, awake, interactive, nontoxic, no acute distress.   Throat: Oropharynx moist without lesions. No perioral lesions or ulcerations.    Lungs:   Clear to auscultation bilaterally; no wheezes, rhonchi or rales; respirations unlabored. On room air.    Heart:  RRR; no murmur, rub or gallop.   Abdomen:   Soft, non-tender, non-distended, positive bowel sounds.     Extremities: No clubbing or  cyanosis, no edema. Generalized weakness.    Skin: No new rashes, lesions, or draining wounds noted on exposed skin.     Labs, Imaging, & Other studies:   All pertinent labs and imaging studies were personally reviewed  Results from last 7 days   Lab Units 08/20/24  1055 08/18/24 2019   WBC Thousand/uL 8.36 9.79   HEMOGLOBIN g/dL 8.8* 9.9*   PLATELETS Thousands/uL 247 266     Results from last 7 days   Lab Units 08/20/24  1055 08/19/24  0518 08/18/24 2019   POTASSIUM mmol/L 3.8   < > 4.5   CHLORIDE mmol/L 104   < > 100   CO2 mmol/L 25   < > 23   BUN mg/dL 16   < > 18   CREATININE mg/dL 0.72   < > 0.71   EGFR ml/min/1.73sq m 103   < > 104   CALCIUM mg/dL 8.3*   < > 8.8   AST U/L  --   --  18   ALT U/L  --   --  13   ALK PHOS U/L  --   --  56    < > = values in this interval not displayed.     Results from last 7 days   Lab Units 08/19/24  1236 08/18/24  2219 08/18/24 2025 08/18/24 2019   BLOOD CULTURE  Received in Microbiology Lab. Culture in Progress.  Received in Microbiology Lab. Culture in Progress.  --  Escherichia coli* No Growth at 24 hrs.   GRAM STAIN RESULT   --   --  Gram negative rods*  --    URINE CULTURE   --  >100,000 cfu/ml Escherichia coli*  --   --      Results from last 7 days   Lab Units 08/18/24 2025   PROCALCITONIN ng/ml 3.31*                     Imaging Studies:  I have personally reviewed pertinent imaging study reports and images in PACS.     8/19 CT C/A/P:   - small stable right pleural effusions, mild right lower lobe compressive atelectasis. No evidence of pneumonia  - no acute intra-abdominal abnormality.  - nonobstructing stone noted to left upper pole calyx of left kidney. No hydronephrosis. Tiny hypodensities of left mid kidney too small to characterize. Mild nonspecific bilateral perinephric edema.   - large amount of colonic stool suggesting constipation      Lila Goodman PA-C  [facility]

## 2024-08-20 NOTE — PROGRESS NOTES
[facility]  Progress Note  Name: Jairon Oconnell I  MRN: [id number]  Unit/Bed#: -01 I Date of Admission: 8/18/2024   Date of Service: 8/20/2024 I Hospital Day: 2    Assessment & Plan   * Sepsis (HCC)  Assessment & Plan  Meeting criteria due to being febrile, tachycardic, tachypneic in the setting of a UTI  Procalcitonin mildly elevated, lactic acid initially elevated 2.6, will trend until WNL  Given 30 mg/kg IV fluid bolus in the ED, will continue IV fluid hydration  Started IV antibiotic   Today's update and plan:   mL bolus given as patient looks dehydrated, dry oral mucosa, tachycardia  Continue IV ceftriaxone 2 g every 24 hourly  Blood cultures positive for E. coli pending culture sensitivity  Will follow blood cultures  CT chest abdomen pelvis did not show any acute pathology except stool in the colon which could be precipitating factor for the UTI  Will give tapwater enema    Weakness  Assessment & Plan  CTA head and neck negative for stroke   MRI brain ordered by neurology  Patient is much awake, able to move his right hand and right leg.        UTI (urinary tract infection)  Assessment & Plan  Urinalysis positive for large leukocytes, occasional bacteria, caregiver reporting patient with increased urinary frequency and incontinence  Continue with IV ceftriaxone 2 g daily  Blood culture showing E. coli will follow culture sensitivity    Constipation  Assessment & Plan  Tapwater enema x 1    Behavior concern in adult  Assessment & Plan  Caregiver reporting more lethargy over the last 2 days from baseline   Continue Seroquel 3 times daily, as needed temazepam, zonisamide  Fall precautions, delirium precautions, provide frequent verbal redirection    Generalized convulsive epilepsy (HCC)  Assessment & Plan  Continue home meds PTA    Type 2 diabetes mellitus with diabetic neuropathy, without long-term current use of insulin (HCC)  Assessment & Plan  Lab Results   Component  Value Date    HGBA1C 6.4 (H) 2024       Recent Labs     24  1616 24  2051 24  0745 24  1110   POCGLU 117 132 137 155*         Blood Sugar Average: Last 72 hrs:  (P) 168.875  Blood glucose checks 4 times daily, hypoglycemia protocol  Hold home metformin for now  ssi               VTE Pharmacologic Prophylaxis: VTE Score: 3 Moderate Risk (Score 3-4) - Pharmacological DVT Prophylaxis Ordered: enoxaparin (Lovenox).    Mobility:   Basic Mobility Inpatient Raw Score: 6  JH-HLM Goal: 2: Bed activities/Dependent transfer  JH-HLM Achieved: 2: Bed activities/Dependent transfer  JH-HLM Goal NOT achieved. Continue with multidisciplinary rounding and encourage appropriate mobility to improve upon JH-HLM goals.    Patient Centered Rounds: I performed bedside rounds with nursing staff today.   Discussions with Specialists or Other Care Team Provider: ID    Education and Discussions with Family / Patient:  Reached out to caregiver but she missed the call.         Current Length of Stay: 2 day(s)  Current Patient Status: Inpatient   Certification Statement: The patient will continue to require additional inpatient hospital stay due to gram-negative bacteremia requiring IV antibiotics  Discharge Plan: Anticipate discharge in 48-72 hrs to group home.    Code Status: Level 1 - Full Code    Subjective:   Patient had a spike of fever last night.  He is much awake than yesterday.  Following commands.    Objective:     Vitals:   Temp (24hrs), Av.6 °F (37.6 °C), Min:98.3 °F (36.8 °C), Max:101.2 °F (38.4 °C)    Temp:  [98.3 °F (36.8 °C)-101.2 °F (38.4 °C)] 98.3 °F (36.8 °C)  HR:  [104-114] 111  Resp:  [16] 16  BP: (118-141)/(61-74) 118/61  SpO2:  [89 %-96 %] 91 %  Body mass index is 23.88 kg/m².     Input and Output Summary (last 24 hours):     Intake/Output Summary (Last 24 hours) at 2024 1232  Last data filed at 2024 0937  Gross per 24 hour   Intake 660 ml   Output 1250 ml   Net -590 ml        Physical Exam:   Constitutional: Ill-appearing, dehydrated  HEENT: Pallor or icterus  CVS: S1 plus S2  Respiratory: Normal vascular breathe without crackles and wheeze  Gastroenterology: Soft nontender without any palpable mass  Skin: No bruises or ecchymosis  Neurology: Much awake, able to move right side which he could not do yesterday    Additional Data:     Labs:  Results from last 7 days   Lab Units 08/20/24  1055 08/18/24 2019   WBC Thousand/uL 8.36 9.79   HEMOGLOBIN g/dL 8.8* 9.9*   HEMATOCRIT % 27.6* 31.0*   PLATELETS Thousands/uL 247 266   BANDS PCT %  --  7   LYMPHO PCT %  --  10*   MONO PCT %  --  26*   EOS PCT %  --  1     Results from last 7 days   Lab Units 08/20/24  1055 08/19/24 0518 08/18/24 2019   SODIUM mmol/L 137   < > 133*   POTASSIUM mmol/L 3.8   < > 4.5   CHLORIDE mmol/L 104   < > 100   CO2 mmol/L 25   < > 23   BUN mg/dL 16   < > 18   CREATININE mg/dL 0.72   < > 0.71   ANION GAP mmol/L 8   < > 10   CALCIUM mg/dL 8.3*   < > 8.8   ALBUMIN g/dL  --   --  3.6   TOTAL BILIRUBIN mg/dL  --   --  0.26   ALK PHOS U/L  --   --  56   ALT U/L  --   --  13   AST U/L  --   --  18   GLUCOSE RANDOM mg/dL 185*   < > 265*    < > = values in this interval not displayed.     Results from last 7 days   Lab Units 08/18/24 2025   INR  0.93     Results from last 7 days   Lab Units 08/20/24  1110 08/20/24  0745 08/19/24  2051 08/19/24  1616 08/19/24  1112 08/19/24  0656 08/19/24  0011 08/18/24 2002   POC GLUCOSE mg/dl 155* 137 132 117 196* 170* 184* 260*     Results from last 7 days   Lab Units 08/20/24  0536   HEMOGLOBIN A1C % 6.4*     Results from last 7 days   Lab Units 08/18/24  2331 08/18/24 2025   LACTIC ACID mmol/L 1.5 2.6*   PROCALCITONIN ng/ml  --  3.31*       Lines/Drains:  Invasive Devices       Peripheral Intravenous Line  Duration             Peripheral IV 08/18/24 Distal;Left;Upper;Ventral (anterior) Antecubital 1 day                          Imaging: Personally reviewed the following  imaging: abdominal/pelvic CT    Recent Cultures (last 7 days):   Results from last 7 days   Lab Units 08/19/24  1236 08/18/24  2219 08/18/24 2025 08/18/24 2019   BLOOD CULTURE  Received in Microbiology Lab. Culture in Progress.  Received in Microbiology Lab. Culture in Progress.  --  Escherichia coli* No Growth at 24 hrs.   GRAM STAIN RESULT   --   --  Gram negative rods*  --    URINE CULTURE   --  >100,000 cfu/ml Escherichia coli*  --   --        Last 24 Hours Medication List:   Current Facility-Administered Medications   Medication Dose Route Frequency Provider Last Rate    aspirin  81 mg Oral Daily Naty Holt PA-C      cefTRIAXone  2,000 mg Intravenous Q24H iMmi Ibrahim PA-C 2,000 mg (08/20/24 0930)    cyanocobalamin  500 mcg Oral Daily Mimi Ibrahim PA-C      divalproex sodium  1,000 mg Oral HS Bhargav Barnes MD      divalproex sodium  500 mg Oral Daily Mimi Ibrahim PA-C      docusate sodium  100 mg Oral BID Mimi Ibrahim PA-C      enoxaparin  40 mg Subcutaneous Daily Mimi Ibrahim PA-C      folic acid  1 mg Oral Daily Mimi Ibrahim PA-C      gabapentin  400 mg Oral TID Mimi Ibrahim PA-C      insulin lispro  1-5 Units Subcutaneous TID AC Mimi Ibrahim PA-C      insulin lispro  1-5 Units Subcutaneous HS Mimi Ibrahim PA-C      lacosamide  150 mg Oral Daily Mimi Ibrahim PA-C      lacosamide  200 mg Oral HS Mimi Ibrahim PA-C      levETIRAcetam  1,500 mg Oral Q12H [facility] Mimi Ibrahim PA-C      levothyroxine  150 mcg Oral Early Morning Mimi Ibrahim PA-C      lisinopril  5 mg Oral Daily Mimi Ibrahim PA-C      loratadine  10 mg Oral Daily Mimi Ibrahim PA-C      multivitamin stress formula  1 tablet Oral Daily Mimi Ibrahim PA-C      OLANZapine  5 mg Oral Once PRN Isac Mendenhall MD      polyethylene glycol  17 g Oral Daily PRN Mimi Ibrahim PA-C      pravastatin  80 mg Oral Daily With Dinner Mimi Ibrahim PA-C      QUEtiapine  400 mg Oral HS Mimi Ibrahim  JAIR      QUEtiapine  200 mg Oral BID before breakfast/lunch Mimi Ibrahim PA-C      senna-docusate sodium  2 tablet Oral BID Mimi Ibrahim PA-C      sodium chloride  500 mL Intravenous Once Bhargav Barnes MD      temazepam  15 mg Oral HS PRN Mimi Ibrahim PA-C      zonisamide  300 mg Oral HS Mimi Ibrahim PA-C          Today, Patient Was Seen By: Bhargav Barnes MD    **Please Note: This note may have been constructed using a voice recognition system.**

## 2024-08-20 NOTE — ASSESSMENT & PLAN NOTE
CTA head and neck negative for stroke   MRI brain ordered by neurology  Patient is much awake, able to move his right hand and right leg.

## 2024-08-20 NOTE — ASSESSMENT & PLAN NOTE
Meeting criteria due to being febrile, tachycardic, tachypneic in the setting of a UTI  Procalcitonin mildly elevated, lactic acid initially elevated 2.6, will trend until WNL  Given 30 mg/kg IV fluid bolus in the ED, will continue IV fluid hydration  Started IV antibiotic   Today's update and plan:   mL bolus given as patient looks dehydrated, dry oral mucosa, tachycardia  Continue IV ceftriaxone 2 g every 24 hourly  Blood cultures positive for E. coli pending culture sensitivity  Will follow blood cultures  CT chest abdomen pelvis did not show any acute pathology except stool in the colon which could be precipitating factor for the UTI  Will give tapwater enema

## 2024-08-20 NOTE — ASSESSMENT & PLAN NOTE
Lab Results   Component Value Date    HGBA1C 6.4 (H) 08/20/2024       Recent Labs     08/19/24  1616 08/19/24  2051 08/20/24  0745 08/20/24  1110   POCGLU 117 132 137 155*         Blood Sugar Average: Last 72 hrs:  (P) 168.875  Blood glucose checks 4 times daily, hypoglycemia protocol  Hold home metformin for now  ssi

## 2024-08-20 NOTE — ASSESSMENT & PLAN NOTE
Urinalysis positive for large leukocytes, occasional bacteria, caregiver reporting patient with increased urinary frequency and incontinence  Continue with IV ceftriaxone 2 g daily  Blood culture showing E. coli will follow culture sensitivity

## 2024-08-20 NOTE — CASE MANAGEMENT
Case Management Progress Note    Patient name Jairon Oconnell  Location /-01 MRN [id number]  : 1965 Date 2024       LOS (days): 2  Geometric Mean LOS (GMLOS) (days): 3.6  Days to GMLOS:1.8        OBJECTIVE:        Current admission status: Inpatient  Preferred Pharmacy:   [facility] - [address]  [address] [address]  Phone: [phone] Fax: [phone]    Primary Care Provider: No primary care provider on file.    Primary Insurance: MEDICARE  Secondary Insurance[place] [employer]    PROGRESS NOTE:  CM conferred with Nursing.  Patient with Behavioral issues- now on 1:1.    Plan CM will monitor and coordinate d/c planning as appropriate.  [facility] has accepted if patient returns home a
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
## 2024-08-21 ENCOUNTER — APPOINTMENT (INPATIENT)
Dept: CT IMAGING | Facility: HOSPITAL | Age: 59
DRG: 872 | End: 2024-08-21
Payer: MEDICARE

## 2024-08-21 LAB
ANION GAP SERPL CALCULATED.3IONS-SCNC: 7 MMOL/L (ref 4–13)
BACTERIA BLD CULT: ABNORMAL
BACTERIA UR CULT: ABNORMAL
BUN SERPL-MCNC: 15 MG/DL (ref 5–25)
CALCIUM SERPL-MCNC: 8.5 MG/DL (ref 8.4–10.2)
CHLORIDE SERPL-SCNC: 104 MMOL/L (ref 96–108)
CO2 SERPL-SCNC: 26 MMOL/L (ref 21–32)
CREAT SERPL-MCNC: 0.56 MG/DL (ref 0.6–1.3)
E COLI DNA BLD POS QL NAA+NON-PROBE: DETECTED
ERYTHROCYTE [DISTWIDTH] IN BLOOD BY AUTOMATED COUNT: 15 % (ref 11.6–15.1)
GFR SERPL CREATININE-BSD FRML MDRD: 114 ML/MIN/1.73SQ M
GLUCOSE SERPL-MCNC: 115 MG/DL (ref 65–140)
GLUCOSE SERPL-MCNC: 121 MG/DL (ref 65–140)
GLUCOSE SERPL-MCNC: 156 MG/DL (ref 65–140)
GLUCOSE SERPL-MCNC: 161 MG/DL (ref 65–140)
GLUCOSE SERPL-MCNC: 185 MG/DL (ref 65–140)
GRAM STN SPEC: ABNORMAL
HCT VFR BLD AUTO: 27 % (ref 36.5–49.3)
HGB BLD-MCNC: 8.3 G/DL (ref 12–17)
MCH RBC QN AUTO: 26 PG (ref 26.8–34.3)
MCHC RBC AUTO-ENTMCNC: 30.7 G/DL (ref 31.4–37.4)
MCV RBC AUTO: 85 FL (ref 82–98)
PLATELET # BLD AUTO: 258 THOUSANDS/UL (ref 149–390)
PMV BLD AUTO: 10.5 FL (ref 8.9–12.7)
POTASSIUM SERPL-SCNC: 4 MMOL/L (ref 3.5–5.3)
RBC # BLD AUTO: 3.19 MILLION/UL (ref 3.88–5.62)
SODIUM SERPL-SCNC: 137 MMOL/L (ref 135–147)
WBC # BLD AUTO: 7.64 THOUSAND/UL (ref 4.31–10.16)

## 2024-08-21 PROCEDURE — 74177 CT ABD & PELVIS W/CONTRAST: CPT

## 2024-08-21 PROCEDURE — 99222 1ST HOSP IP/OBS MODERATE 55: CPT | Performed by: PHYSICIAN ASSISTANT

## 2024-08-21 PROCEDURE — 99233 SBSQ HOSP IP/OBS HIGH 50: CPT | Performed by: STUDENT IN AN ORGANIZED HEALTH CARE EDUCATION/TRAINING PROGRAM

## 2024-08-21 PROCEDURE — 82948 REAGENT STRIP/BLOOD GLUCOSE: CPT

## 2024-08-21 PROCEDURE — 99233 SBSQ HOSP IP/OBS HIGH 50: CPT | Performed by: INTERNAL MEDICINE

## 2024-08-21 PROCEDURE — 85027 COMPLETE CBC AUTOMATED: CPT | Performed by: STUDENT IN AN ORGANIZED HEALTH CARE EDUCATION/TRAINING PROGRAM

## 2024-08-21 PROCEDURE — 80048 BASIC METABOLIC PNL TOTAL CA: CPT | Performed by: STUDENT IN AN ORGANIZED HEALTH CARE EDUCATION/TRAINING PROGRAM

## 2024-08-21 RX ORDER — OLANZAPINE 10 MG/2ML
5 INJECTION, POWDER, FOR SOLUTION INTRAMUSCULAR ONCE
Status: COMPLETED | OUTPATIENT
Start: 2024-08-21 | End: 2024-08-21

## 2024-08-21 RX ORDER — CEFAZOLIN SODIUM 2 G/50ML
2000 SOLUTION INTRAVENOUS EVERY 8 HOURS
Status: DISCONTINUED | OUTPATIENT
Start: 2024-08-22 | End: 2024-08-23 | Stop reason: HOSPADM

## 2024-08-21 RX ORDER — WATER 10 ML/10ML
INJECTION INTRAMUSCULAR; INTRAVENOUS; SUBCUTANEOUS
Status: DISPENSED
Start: 2024-08-21 | End: 2024-08-22

## 2024-08-21 RX ADMIN — INSULIN LISPRO 1 UNITS: 100 INJECTION, SOLUTION INTRAVENOUS; SUBCUTANEOUS at 21:22

## 2024-08-21 RX ADMIN — DOCUSATE SODIUM 100 MG: 100 CAPSULE, LIQUID FILLED ORAL at 17:17

## 2024-08-21 RX ADMIN — LACOSAMIDE 200 MG: 100 TABLET, FILM COATED ORAL at 21:13

## 2024-08-21 RX ADMIN — ASPIRIN 81 MG: 81 TABLET, CHEWABLE ORAL at 08:33

## 2024-08-21 RX ADMIN — DIVALPROEX SODIUM 500 MG: 500 TABLET, EXTENDED RELEASE ORAL at 08:35

## 2024-08-21 RX ADMIN — OLANZAPINE 5 MG: 10 INJECTION, POWDER, FOR SOLUTION INTRAMUSCULAR at 22:04

## 2024-08-21 RX ADMIN — CYANOCOBALAMIN TAB 500 MCG 500 MCG: 500 TAB at 08:32

## 2024-08-21 RX ADMIN — INSULIN LISPRO 1 UNITS: 100 INJECTION, SOLUTION INTRAVENOUS; SUBCUTANEOUS at 16:47

## 2024-08-21 RX ADMIN — B-COMPLEX W/ C & FOLIC ACID TAB 1 TABLET: TAB at 08:32

## 2024-08-21 RX ADMIN — PRAVASTATIN SODIUM 80 MG: 80 TABLET ORAL at 16:47

## 2024-08-21 RX ADMIN — QUETIAPINE FUMARATE 200 MG: 100 TABLET ORAL at 11:37

## 2024-08-21 RX ADMIN — DIVALPROEX SODIUM 1000 MG: 500 TABLET, EXTENDED RELEASE ORAL at 21:13

## 2024-08-21 RX ADMIN — INSULIN LISPRO 1 UNITS: 100 INJECTION, SOLUTION INTRAVENOUS; SUBCUTANEOUS at 11:38

## 2024-08-21 RX ADMIN — QUETIAPINE FUMARATE 200 MG: 100 TABLET ORAL at 06:29

## 2024-08-21 RX ADMIN — LEVOTHYROXINE SODIUM 150 MCG: 0.15 TABLET ORAL at 06:29

## 2024-08-21 RX ADMIN — CEFTRIAXONE SODIUM 2000 MG: 10 INJECTION, POWDER, FOR SOLUTION INTRAVENOUS at 11:30

## 2024-08-21 RX ADMIN — GABAPENTIN 400 MG: 400 CAPSULE ORAL at 08:33

## 2024-08-21 RX ADMIN — QUETIAPINE 400 MG: 200 TABLET, FILM COATED, EXTENDED RELEASE ORAL at 21:13

## 2024-08-21 RX ADMIN — ENOXAPARIN SODIUM 40 MG: 40 INJECTION SUBCUTANEOUS at 08:34

## 2024-08-21 RX ADMIN — GABAPENTIN 400 MG: 400 CAPSULE ORAL at 21:13

## 2024-08-21 RX ADMIN — LORATADINE 10 MG: 10 TABLET ORAL at 08:33

## 2024-08-21 RX ADMIN — SENNOSIDES AND DOCUSATE SODIUM 2 TABLET: 50; 8.6 TABLET ORAL at 08:33

## 2024-08-21 RX ADMIN — LISINOPRIL 5 MG: 5 TABLET ORAL at 08:35

## 2024-08-21 RX ADMIN — LEVETIRACETAM 1500 MG: 500 TABLET, FILM COATED ORAL at 08:32

## 2024-08-21 RX ADMIN — IOHEXOL 100 ML: 350 INJECTION, SOLUTION INTRAVENOUS at 12:45

## 2024-08-21 RX ADMIN — DOCUSATE SODIUM 100 MG: 100 CAPSULE, LIQUID FILLED ORAL at 08:32

## 2024-08-21 RX ADMIN — LEVETIRACETAM 1500 MG: 500 TABLET, FILM COATED ORAL at 21:13

## 2024-08-21 RX ADMIN — LACOSAMIDE 150 MG: 100 TABLET, FILM COATED ORAL at 08:34

## 2024-08-21 RX ADMIN — GABAPENTIN 400 MG: 400 CAPSULE ORAL at 16:47

## 2024-08-21 RX ADMIN — SENNOSIDES AND DOCUSATE SODIUM 2 TABLET: 50; 8.6 TABLET ORAL at 17:17

## 2024-08-21 RX ADMIN — FOLIC ACID 1 MG: 1 TABLET ORAL at 08:33

## 2024-08-21 RX ADMIN — ZONISAMIDE 300 MG: 100 CAPSULE ORAL at 21:13

## 2024-08-21 NOTE — ASSESSMENT & PLAN NOTE
Caregiver reporting more lethargy over the last 2 days from baseline   Continue Seroquel 3 times daily, as needed temazepam, zonisamide  Fall precautions, delirium precautions, provide frequent verbal redirection   as mentioned and generalized convulsive epilepsy

## 2024-08-21 NOTE — CONSULTS
Consult- General Surgery   Jairon Oconnell 59 y.o. male MRN: [id number]  Unit/Bed#: -01 Encounter: [id number]      Assessment & Plan     Jairon Oconnell is a 59 y.o. male    Consult for suture removal  Evaluated patient bedside. Two retained silk sutures noted likely from prior chest tube site. Incision well healed. Sutures removed bedside without difficulty. No further surgical intervention indicated. No need for further General Surgery follow up  General Surgery will sign off     History of Present Illness     HPI:  Jairon Oconnell is a 59 y.o. male who presents with retained sutures on lateral aspect of right ribs. Patient does have developmental delay and is unable to provide a history. On review of chart and discussion with nursing, sutures appear to be from prior chest tube site during admission in June. Incision appears clean and fully healed.    Review of Systems   Unable to perform ROS: Psychiatric disorder       Historical Information   Past Medical History:   Diagnosis Date    ADRIANA (acute kidney injury) ([facility]) 9/24/2019    Bacteremia due to Gram-positive bacteria 9/7/2021    Buttock wound, left, subsequent encounter 11/5/2021    COVID-19     CP (cerebral palsy) ([facility])     Depression     Diabetes ([facility])     Disease of thyroid gland     Gait disorder     Gluteal abscess 9/6/2021    Hyperlipidemia     Hypertension     Kidney failure     Kidney stones     Moderate protein-calorie malnutrition ([facility]) 12/22/2021    Osteoporosis     Pressure injury of left buttock, stage 4 ([facility]) 3/9/2022    Psychiatric disorder     Scoliosis     Seizures ([facility])     Sepsis ([facility]) 9/25/2019    Thyroid disease     UTI (urinary tract infection)      Past Surgical History:   Procedure Laterality Date    APPENDECTOMY      IR CHEST TUBE PLACEMENT  6/14/2024    IR PICC PLACEMENT SINGLE LUMEN  9/13/2021    IR PICC PLACEMENT SINGLE LUMEN  9/16/2021     Social History   Social History     Substance and Sexual Activity   Alcohol Use Never      Social History     Substance and Sexual Activity   Drug Use No     Social History     Tobacco Use   Smoking Status Never    Passive exposure: Never   Smokeless Tobacco Never     Family History: History reviewed. No pertinent family history.    Meds/Allergies   PTA meds:   Prior to Admission Medications   Prescriptions Last Dose Informant Patient Reported? Taking?   Blood Glucose Monitoring Suppl (OneTouch Verio Reflect) w/Device KIT   No No   Sig: Check blood sugars twice daily. Please substitute with appropriate alternative as covered by patient's insurance. Dx: E11.65   Depakote 500 MG DR tablet   No No   Sig: Take 1 tab in the morning and 2 tabs at night. Brand necessary   Disposable Gloves (Safe-Sense Glove-Blk-Nitrl-L) MISC  Care Giver No No   Sig: Use 1 each 3 (three) times a day as needed (care taker assisting with soiled briefs)   Incontinence Supply Disposable (Briefs Overnight Medium) MISC  Care Giver No No   Sig: Use in the morning   Patient not taking: Reported on 8/1/2024   Multiple Vitamin (Daily-Reina) TABS  Care Giver No No   Sig: Take 1 tablet by mouth daily Take at 8 AM   Nutritional Supplements (Ensure Max Protein) LIQD   No No   Sig: Take 1 Bottle by mouth in the morning   OneTouch Delica Lancets 33G MISC  Care Giver No No   Sig: by Does not apply route daily TEST BLOOD SUGARS THREE TIMES DAILY   Patient not taking: Reported on 8/1/2024   OneTouch Delica Lancets 33G MISC   No No   Sig: Check blood sugars once daily. Please substitute with appropriate alternative as covered by patient's insurance. Dx: E11.65   OneTouch Delica Lancets 33G MISC   No No   Sig: Check blood sugars twice daily. Please substitute with appropriate alternative as covered by patient's insurance. Dx: E11.65   QUEtiapine (SEROquel XR) 400 mg 24 hr tablet   No No   Sig: Take 1 tablet (400 mg total) by mouth daily at bedtime   QUEtiapine (SEROquel) 200 mg tablet   No No   Sig: Take 1 tablet (200 mg total) by mouth 2 (two)  times a day before breakfast and lunch   cyanocobalamin (VITAMIN B-12) 500 MCG tablet   No No   Sig: Take 1 tablet (500 mcg total) by mouth daily   docusate sodium (COLACE) 100 mg capsule   No No   Sig: Take 1 capsule (100 mg total) by mouth 2 (two) times a day   folic acid (FOLVITE) 1 mg tablet   No No   Sig: GIVE 1 TABLET BY MOUTH DAILY DX METABOLIC ENCEPHALOPATHY   gabapentin (NEURONTIN) 400 mg capsule  Care Giver No No   Sig: Take 1 capsule (400 mg total) by mouth 3 (three) times a day   glucose blood (OneTouch Verio) test strip   No No   Sig: Check blood sugars once daily. Please substitute with appropriate alternative as covered by patient's insurance. Dx: E11.65   glucose blood (OneTouch Verio) test strip   No No   Sig: Check blood sugars twice daily. Please substitute with appropriate alternative as covered by patient's insurance. Dx: E11.65   lacosamide (VIMPAT) 150 mg tablet   No No   Sig: Take 1 tab (150 mg) in the morning.   lacosamide (VIMPAT) 200 mg tablet   No No   Sig: Take 1 tab (200 mg) at night.   levETIRAcetam (KEPPRA) 750 mg tablet   No No   Sig: Give 2 tabs (1500 mg) by mouth twice a day   levothyroxine 150 mcg tablet   No No   Sig: Take 1 tablet (150 mcg total) by mouth daily   lisinopril (ZESTRIL) 5 mg tablet   No No   Sig: Take 1 tablet (5 mg total) by mouth daily   loratadine (CLARITIN) 10 mg tablet  Care Giver No No   Sig: Take 1 tablet (10 mg total) by mouth daily   magnesium Oxide (MAG-OX) 400 mg TABS   No No   Sig: Take 1 tablet (400 mg total) by mouth 2 (two) times a day   metFORMIN (GLUCOPHAGE) 1000 MG tablet   No No   Sig: GIVE 1 TABLET BY MOUTH TWICE DAILY WITH MEALS FOR DIABETES   polyethylene glycol (MIRALAX) 17 g packet   No No   Sig: Take 17 g by mouth daily for 14 days   senna-docusate sodium (SENOKOT S) 8.6-50 mg per tablet   No No   Sig: Take 2 tablets by mouth 2 (two) times a day   simvastatin (ZOCOR) 40 mg tablet   No No   Sig: GIVE 1 TABLET BY MOUTH AT BEDTIME FOR  CHOLESTEROL   temazepam (RESTORIL) 30 mg capsule  Care Giver No No   Sig: Take 1 capsule (30 mg total) by mouth daily at bedtime for 10 days   Patient taking differently: Take 15 mg by mouth daily at bedtime as needed for sleep   zonisamide (ZONEGRAN) 100 mg capsule   No No   Sig: Take 3 capsules (300 mg total) by mouth daily at bedtime      Facility-Administered Medications: None     Allergies   Allergen Reactions    Depakote [Valproic Acid] Other (See Comments) and Seizures     Must have brand name Depakote. Off brand Depakote will induce seizures PER caregiver.    Erythromycin Other (See Comments)     Unknown per pt    Penicillins Other (See Comments)     Unknown per pt       Objective   First Vitals:   Blood Pressure: 150/67 (08/18/24 2001)  Pulse: (!) 124 (08/18/24 2001)  Temperature: 99.4 °F (37.4 °C) (08/18/24 2001)  Temp Source: Temporal (08/18/24 2001)  Respirations: 16 (08/18/24 2001)  Height: 6' (182.9 cm) (08/18/24 2331)  Weight - Scale: 79.9 kg (176 lb 1.6 oz) (08/18/24 2331)  SpO2: 93 % (08/18/24 2001)    Current Vitals:   Blood Pressure: 128/69 (08/21/24 0835)  Pulse: 103 (08/21/24 0422)  Temperature: 98.3 °F (36.8 °C) (08/21/24 0835)  Temp Source: Oral (08/20/24 2206)  Respirations: 14 (08/20/24 2206)  Height: 6' (182.9 cm) (08/18/24 2331)  Weight - Scale: 79.9 kg (176 lb 1.6 oz) (08/18/24 2331)  SpO2: 92 % (08/21/24 0422)      Intake/Output Summary (Last 24 hours) at 8/21/2024 1107  Last data filed at 8/21/2024 0830  Gross per 24 hour   Intake 1060 ml   Output 1150 ml   Net -90 ml       Invasive Devices       Peripheral Intravenous Line  Duration             Peripheral IV 08/18/24 Distal;Left;Upper;Ventral (anterior) Antecubital 2 days                    Physical Exam  Constitutional:       General: He is not in acute distress.     Appearance: He is well-developed. He is not diaphoretic.   HENT:      Head: Normocephalic and atraumatic.      Right Ear: External ear normal.      Left Ear: External ear  normal.      Mouth/Throat:      Pharynx: No oropharyngeal exudate.   Eyes:      Extraocular Movements: EOM normal.      Conjunctiva/sclera: Conjunctivae normal.      Pupils: Pupils are equal, round, and reactive to light.   Neck:      Thyroid: No thyromegaly.      Trachea: No tracheal deviation.   Cardiovascular:      Rate and Rhythm: Normal rate and regular rhythm.      Heart sounds: Normal heart sounds. No murmur heard.     No friction rub. No gallop.   Pulmonary:      Effort: Pulmonary effort is normal. No respiratory distress.      Breath sounds: Normal breath sounds. No wheezing or rales.   Chest:      Chest wall: No tenderness.   Abdominal:      General: Bowel sounds are normal. There is no distension.      Palpations: Abdomen is soft.      Tenderness: There is no abdominal tenderness. There is no guarding or rebound.   Musculoskeletal:         General: No tenderness, deformity or edema. Normal range of motion.      Cervical back: Normal range of motion and neck supple.   Skin:     General: Skin is warm and dry.      Coloration: Skin is not pale.      Findings: No erythema or rash.      Comments: Two retained silk sutures on lateral aspect of right ribs, underlying incision completely healed and approximated, no surrounding erythema or edema, no drainage noted    Neurological:      Mental Status: He is alert and oriented to person, place, and time.   Psychiatric:         Mood and Affect: Mood and affect normal.         Behavior: Behavior normal.         Thought Content: Thought content normal.         Lab Results: I have personally reviewed pertinent lab results.    Recent Results (from the past 36 hour(s))   Lipid panel    Collection Time: 08/20/24  5:36 AM   Result Value Ref Range    Cholesterol 138 See Comment mg/dL    Triglycerides 115 See Comment mg/dL    HDL, Direct 28 (L) >=40 mg/dL    LDL Calculated 87 0 - 100 mg/dL    Non-HDL-Chol (CHOL-HDL) 110 mg/dl   Hemoglobin A1C    Collection Time: 08/20/24   5:36 AM   Result Value Ref Range    Hemoglobin A1C 6.4 (H) Normal 4.0-5.6%; PreDiabetic 5.7-6.4%; Diabetic >=6.5%; Glycemic control for adults with diabetes <7.0% %     mg/dl   Fingerstick Glucose (POCT)    Collection Time: 08/20/24  7:45 AM   Result Value Ref Range    POC Glucose 137 65 - 140 mg/dl   CBC    Collection Time: 08/20/24 10:55 AM   Result Value Ref Range    WBC 8.36 4.31 - 10.16 Thousand/uL    RBC 3.27 (L) 3.88 - 5.62 Million/uL    Hemoglobin 8.8 (L) 12.0 - 17.0 g/dL    Hematocrit 27.6 (L) 36.5 - 49.3 %    MCV 84 82 - 98 fL    MCH 26.9 26.8 - 34.3 pg    MCHC 31.9 31.4 - 37.4 g/dL    RDW 15.2 (H) 11.6 - 15.1 %    Platelets 247 149 - 390 Thousands/uL    MPV 10.0 8.9 - 12.7 fL   Basic metabolic panel    Collection Time: 08/20/24 10:55 AM   Result Value Ref Range    Sodium 137 135 - 147 mmol/L    Potassium 3.8 3.5 - 5.3 mmol/L    Chloride 104 96 - 108 mmol/L    CO2 25 21 - 32 mmol/L    ANION GAP 8 4 - 13 mmol/L    BUN 16 5 - 25 mg/dL    Creatinine 0.72 0.60 - 1.30 mg/dL    Glucose 185 (H) 65 - 140 mg/dL    Calcium 8.3 (L) 8.4 - 10.2 mg/dL    eGFR 103 ml/min/1.73sq m   Fingerstick Glucose (POCT)    Collection Time: 08/20/24 11:10 AM   Result Value Ref Range    POC Glucose 155 (H) 65 - 140 mg/dl   Fingerstick Glucose (POCT)    Collection Time: 08/20/24  3:51 PM   Result Value Ref Range    POC Glucose 186 (H) 65 - 140 mg/dl   Fingerstick Glucose (POCT)    Collection Time: 08/20/24  9:57 PM   Result Value Ref Range    POC Glucose 188 (H) 65 - 140 mg/dl   CBC    Collection Time: 08/21/24  5:47 AM   Result Value Ref Range    WBC 7.64 4.31 - 10.16 Thousand/uL    RBC 3.19 (L) 3.88 - 5.62 Million/uL    Hemoglobin 8.3 (L) 12.0 - 17.0 g/dL    Hematocrit 27.0 (L) 36.5 - 49.3 %    MCV 85 82 - 98 fL    MCH 26.0 (L) 26.8 - 34.3 pg    MCHC 30.7 (L) 31.4 - 37.4 g/dL    RDW 15.0 11.6 - 15.1 %    Platelets 258 149 - 390 Thousands/uL    MPV 10.5 8.9 - 12.7 fL   Basic metabolic panel    Collection Time: 08/21/24  5:47 AM    Result Value Ref Range    Sodium 137 135 - 147 mmol/L    Potassium 4.0 3.5 - 5.3 mmol/L    Chloride 104 96 - 108 mmol/L    CO2 26 21 - 32 mmol/L    ANION GAP 7 4 - 13 mmol/L    BUN 15 5 - 25 mg/dL    Creatinine 0.56 (L) 0.60 - 1.30 mg/dL    Glucose 121 65 - 140 mg/dL    Calcium 8.5 8.4 - 10.2 mg/dL    eGFR 114 ml/min/1.73sq m   Fingerstick Glucose (POCT)    Collection Time: 08/21/24  6:39 AM   Result Value Ref Range    POC Glucose 115 65 - 140 mg/dl     Imaging: I have personally reviewed pertinent reports.    MRI brain wo contrast    Result Date: 8/20/2024  Impression: No acute infarct, new mass effect or midline shift. Workstation performed: [id number]     CT chest abdomen pelvis w contrast    Result Date: 8/19/2024  Impression: 1. No definite CT findings to account for sepsis. 2. Stable small right pleural effusion with mild right lower lobe compressive atelectasis. No definitive evidence for pneumonia. 3. No acute intra-abdominal abnormality. 4. Large amount of colonic stool suggesting constipation. Additional incidental findings as above. Workstation performed: [id number]     CTA head and neck w wo contrast    Result Date: 8/19/2024  Impression: CT Brain:  No acute intracranial abnormality. Cerebellar predominant volume loss is unchanged. CT Angiography:  Unremarkable CTA of the head. There is carotid bifurcation atherosclerosis bilaterally in the neck without stenosis by NASCET criteria. Workstation performed: [id number]     XR chest 1 view portable    Result Date: 8/19/2024  Impression: Nonspecific patchy right infrahilar opacities, correlate clinically for pneumonia and recommend follow-up to resolution. Workstation performed: [id number]     CT head without contrast    Result Date: 8/18/2024  Impression: No acute intracranial abnormality. Workstation performed: [id number]       EKG, Pathology, and Other Studies: I have personally reviewed pertinent reports.

## 2024-08-21 NOTE — PROGRESS NOTES
Pt placed on 1:1 continual in person observation as per policy for a [facility]x [facility]ica.SLIM notified & order obtained. All unsecured/small items removed, No personal belongings present at start of shift. Pt changed into paper scrubs.

## 2024-08-21 NOTE — PROGRESS NOTES
Progress Note - Infectious Disease   Jairon Oconnell 59 y.o. male MRN: [id number]  Unit/Bed#: -01 Encounter: [id number]      Impression/Plan:  Sepsis. E/b fever, tachycardia. Also with lactic acidosis and elevated procalcitonin. Suspect source is #2 and #3. CT C/A/P showed no definite intra-abdominal abnormalities. However, it did show nonspecific left kidney hypodensities. Patient continued to fever through yesterday and repeat CT A/P was ordered. Has remained afebrile so far today. Has remained without leukocytosis.   Continue antibiotic as below  Follow-up repeat CT A/P  Continue to monitor temperature/vitals  Continue to follow CBCD and BMP with AM labs to monitor for potential antimicrobial toxicities and assess for clinical response to antibiotics.      E.coli bacteremia. 1 out of 2 sets positive on admission. No resistance gene detected. Suspect urinary source given presenting urinary symptoms and Ucx also positive for E.coli. CT chest, abdomen, and pelvis did not show any definite intra-abdominal abnormalities, but did show some tiny hypodensities in the left kidney too small to characterize. Blood cultures resulted pan-sensitive. Will narrow from ceftriaxone to cefazolin as it should get better renal absorption. Eventual plan to transition to PO antibiotics.   Stop IV ceftriaxone  Start IV cefazolin 2g q8h  Anticipate eventual transition to PO antibiotic to complete 7d total antibiotic course     UTI. P/w urinary frequency, incontinence, and lethargy. Found to have #2. UA demonstrated innumerable WBC, large leukocytes, and trace blood. Ucx resulted positive for E.coli as well. CT A/P showed no acute intra-abdominal disease and stable non-obstructing left renal stone. No hydronephrosis. Did show tiny hypodensities of left mid kidney too small to characterize. If patient continues to fever, may need to repeat CT A/P w/ contrast to re-evaluate these hypodensities for development of renal abscess.    Continue antibiotics as above  Continue to monitor fever curve/vitals  Follow-up repeat CT A/P given ongoing fever     T2DM. Most recent A1C 6.7 (2024). Well controlled. Remains a risk factor for development of infection and poor wound healing.   Recommend ongoing tight glycemic control      History of ataxic cerebral palsy and epilepsy. Patient is on Depakote, Keppra, and Vimpat in the outpatient setting. Will check for potential DDI with any antibiotic.      Penicillin allergy. Reaction unknown per patient. Unclear if true allergy. Will avoid for now. Has tolerated IV ceftriaxone.   Monitor for any potential reactions    Above plan was discussed in detail with patient at bedside.   Above plan was discussed in detail with primary service attending, who agrees with the plan to transition from IV ceftriaxone to IV cefazolin and follow-up repeat CT A/P.     Antibiotics:  Ceftriaxone D4    Subjective:  Patient spiked a high fever to 101F last night. Repeat CT A/P was ordered. Patient has been afebrile so far this morning. Denies having any current pain. Patient's sister reports that he has been more sleepy today. Exam and interaction limited due to patient's mental status. Tolerating IV ceftriaxone without development of rashes.     Objective:  Vitals:  Temp:  [97.7 °F (36.5 °C)-102.2 °F (39 °C)] 98.3 °F (36.8 °C)  HR:  [103-110] 103  Resp:  [14] 14  BP: (126-149)/(69-82) 128/69  SpO2:  [92 %] 92 %  Temp (24hrs), Av °F (37.2 °C), Min:97.7 °F (36.5 °C), Max:102.2 °F (39 °C)  Current: Temperature: 98.3 °F (36.8 °C)    Physical Exam:   General Appearance:  Chronically ill appearing, debilitated, awake, interactive, nontoxic, no acute distress.   Throat: Oropharynx moist without lesions. No perioral lesions or ulcerations.    Lungs:   Clear to auscultation bilaterally; no wheezes, rhonchi or rales; respirations unlabored. On room air.    Heart:  RRR; no murmur, rub or gallop.   Abdomen:   Soft, non-tender,  non-distended, positive bowel sounds.     Extremities: No clubbing or cyanosis, no edema. Generalized weakness.    Skin: No new rashes, lesions, or draining wounds noted on exposed skin.     Labs, Imaging, & Other studies:   All pertinent labs and imaging studies were personally reviewed  Results from last 7 days   Lab Units 08/21/24  0547 08/20/24  1055 08/18/24 2019   WBC Thousand/uL 7.64 8.36 9.79   HEMOGLOBIN g/dL 8.3* 8.8* 9.9*   PLATELETS Thousands/uL 258 247 266     Results from last 7 days   Lab Units 08/21/24  0547 08/19/24  0518 08/18/24 2019   POTASSIUM mmol/L 4.0   < > 4.5   CHLORIDE mmol/L 104   < > 100   CO2 mmol/L 26   < > 23   BUN mg/dL 15   < > 18   CREATININE mg/dL 0.56*   < > 0.71   EGFR ml/min/1.73sq m 114   < > 104   CALCIUM mg/dL 8.5   < > 8.8   AST U/L  --   --  18   ALT U/L  --   --  13   ALK PHOS U/L  --   --  56    < > = values in this interval not displayed.     Results from last 7 days   Lab Units 08/19/24  1236 08/18/24  2219 08/18/24 2025 08/18/24 2019   BLOOD CULTURE  No Growth at 24 hrs.  No Growth at 24 hrs.  --  Escherichia coli* No Growth at 48 hrs.   GRAM STAIN RESULT   --   --  Gram negative rods*  --    URINE CULTURE   --  >100,000 cfu/ml Escherichia coli*  --   --      Results from last 7 days   Lab Units 08/18/24 2025   PROCALCITONIN ng/ml 3.31*                     Imaging Studies:  I have personally reviewed pertinent imaging study reports and images in PACS.         Lila Goodman PA-C  [facility]

## 2024-08-21 NOTE — ASSESSMENT & PLAN NOTE
CTA head and neck negative for stroke   MRI brain  negative for stroke  Patient is communicative better than yesterday, calm and cooperative eating with the help of her biological sister

## 2024-08-21 NOTE — ASSESSMENT & PLAN NOTE
Lab Results   Component Value Date    HGBA1C 6.4 (H) 08/20/2024       Recent Labs     08/20/24  1551 08/20/24  2157 08/21/24  0639 08/21/24  1117   POCGLU 186* 188* 115 156*         Blood Sugar Average: Last 72 hrs:  (P) [id number]  Blood glucose checks 4 times daily, hypoglycemia protocol  Hold home metformin for now  ssi

## 2024-08-21 NOTE — QUICK NOTE
MRI brain findings reviewed with Dr. Valentino, no evidence of acute or chronic infarcts, mass effect or midline shift.     Patient no longer requires Aspirin 81 mg daily and Lipitor 40 mg daily from a neurological perspective. No need to continue stroke pathway at this time. No further inpatient neurological recommendations at this time. Patient already has an appointment to see Dr. Banerjee on 8/26/24 at 10:30 AM for epilepsy management.

## 2024-08-21 NOTE — ASSESSMENT & PLAN NOTE
Meeting criteria due to being febrile, tachycardic, tachypneic in the setting of a UTI  Procalcitonin mildly elevated, lactic acid initially elevated 2.6, will trend until WNL  Given 30 mg/kg IV fluid bolus in the ED, will continue IV fluid hydration  Started IV antibiotic   Today's update and plan:  IV ceftriaxone 2 g every 24 hourly as per ID  Blood culture showed E. coli showed pansensitive  Patient had a spike of fever last night will do CT abdomen pelvis with IV contrast to rule out complicated UTI/abscess

## 2024-08-21 NOTE — CASE MANAGEMENT
Case Management Progress Note    Patient name Jairon Oconnell  Location /-01 MRN [id number]  : 1965 Date 2024       LOS (days): 3  Geometric Mean LOS (GMLOS) (days): 3.6  Days to GMLOS:0.8        OBJECTIVE:        Current admission status: Inpatient  Preferred Pharmacy:   [facility] - [address]  [address] [address]  Phone: [phone] Fax: [phone]    Primary Care Provider: No primary care provider on file.    Primary Insurance: MEDICARE  Secondary Insurance: [place] [employer]    PROGRESS NOTE:  CM reviewed chart and conferred with SLIM and Nursing.    Patient remains on a virtual 1:1  CT of abdomen to r/o complicated UTI    Plan: CM to follow for d/c planning.  Followed by [facility] in a setting with private caregivers

## 2024-08-21 NOTE — ASSESSMENT & PLAN NOTE
Urinalysis positive for large leukocytes, occasional bacteria, caregiver reporting patient with increased urinary frequency and incontinence  Continue with IV ceftriaxone 2 g daily  Blood culture showed pansensitive E. Coli  Patient had spike of fever yesterday will do CT abdomen pelvis with IV contrast to rule out any renal abscess

## 2024-08-21 NOTE — PROGRESS NOTES
[facility]  Progress Note  Name: Jairon Oconnell I  MRN: [id number]  Unit/Bed#: -01 I Date of Admission: 8/18/2024   Date of Service: 8/21/2024 I Hospital Day: 3    Assessment & Plan   * Sepsis (HCC)  Assessment & Plan  Meeting criteria due to being febrile, tachycardic, tachypneic in the setting of a UTI  Procalcitonin mildly elevated, lactic acid initially elevated 2.6, will trend until WNL  Given 30 mg/kg IV fluid bolus in the ED, will continue IV fluid hydration  Started IV antibiotic   Today's update and plan:  IV ceftriaxone 2 g every 24 hourly as per ID  Blood culture showed E. coli showed pansensitive  Patient had a spike of fever last night will do CT abdomen pelvis with IV contrast to rule out complicated UTI/abscess    Weakness  Assessment & Plan  CTA head and neck negative for stroke   MRI brain  negative for stroke  Patient is communicative better than yesterday, calm and cooperative eating with the help of her biological sister      UTI (urinary tract infection)  Assessment & Plan  Urinalysis positive for large leukocytes, occasional bacteria, caregiver reporting patient with increased urinary frequency and incontinence  Continue with IV ceftriaxone 2 g daily  Blood culture showed pansensitive E. Coli  Patient had spike of fever yesterday will do CT abdomen pelvis with IV contrast to rule out any renal abscess    Constipation  Assessment & Plan  Tapwater enema x 1 given yesterday    Behavior concern in adult  Assessment & Plan  Caregiver reporting more lethargy over the last 2 days from baseline   Continue Seroquel 3 times daily, as needed temazepam, zonisamide  Fall precautions, delirium precautions, provide frequent verbal redirection   as mentioned and generalized convulsive epilepsy      Generalized convulsive epilepsy (HCC)  Assessment & Plan  Patient got agitated started throwing thing and hitting staff yesterday.  Patient was seen at bedside during my encounter he  was calm and cooperative and ordered one-to-one and avoid giving any more antipsychotics as patient is high risk of delirium due to multiple antipsychotics.  Later on patient became more agitated, ordered 1 dose of IM Zyprexa  Continue one-to-one and do pharmacological if needed    Type 2 diabetes mellitus with diabetic neuropathy, without long-term current use of insulin ([facility])  Assessment & Plan  Lab Results   Component Value Date    HGBA1C 6.4 (H) 08/20/2024       Recent Labs     08/20/24  1551 08/20/24  2157 08/21/24  0639 08/21/24  1117   POCGLU 186* 188* 115 156*         Blood Sugar Average: Last 72 hrs:  (P) [id number]  Blood glucose checks 4 times daily, hypoglycemia protocol  Hold home metformin for now  ssi               VTE Pharmacologic Prophylaxis: VTE Score: 3 Moderate Risk (Score 3-4) - Pharmacological DVT Prophylaxis Ordered: enoxaparin (Lovenox).    Mobility:   Basic Mobility Inpatient Raw Score: 6  JH-HLM Goal: 2: Bed activities/Dependent transfer  JH-HLM Achieved: 2: Bed activities/Dependent transfer  JH-HLM Goal NOT achieved. Continue with multidisciplinary rounding and encourage appropriate mobility to improve upon JH-HLM goals.    Patient Centered Rounds: I performed bedside rounds with nursing staff today.   Discussions with Specialists or Other Care Team Provider: Infectious disease    Education and Discussions with Family / Patient: Updated  (sister) at bedside.      Current Length of Stay: 3 day(s)  Current Patient Status: Inpatient   Certification Statement: The patient will continue to require additional inpatient hospital stay due to severe sepsis secondary to complicated UTI  Discharge Plan: Anticipate discharge in 24-48 hrs to home.    Code Status: Level 1 - Full Code    Subjective:   Patient seen and examined at bedside.  He told me his name  Able to lift up both of his arms and legs  No chest pain, trouble breathing    Objective:     Vitals:   Temp (24hrs),  Av °F (37.2 °C), Min:97.7 °F (36.5 °C), Max:102.2 °F (39 °C)    Temp:  [97.7 °F (36.5 °C)-102.2 °F (39 °C)] 98.3 °F (36.8 °C)  HR:  [103-110] 103  Resp:  [14] 14  BP: (126-149)/(69-82) 128/69  SpO2:  [92 %] 92 %  Body mass index is 23.88 kg/m².     Input and Output Summary (last 24 hours):     Intake/Output Summary (Last 24 hours) at 2024 1311  Last data filed at 2024 0830  Gross per 24 hour   Intake 1060 ml   Output 1150 ml   Net -90 ml       Physical Exam:   Constitutional: Ill-appearing  HEENT: No pallor or icterus  CVS: S1 plus S2  Respiratory: Normal vascular breathe without crackles and wheeze  Gastroenterology: Soft nontender without any palpable mass  Skin: No bruises or ecchymosis  Neurology: Much communicative eating food with the help of her sister and able to move all of his extremities but not to its maximum      Additional Data:     Labs:  Results from last 7 days   Lab Units 24  0547 24  1055 24   WBC Thousand/uL 7.64   < > 9.79   HEMOGLOBIN g/dL 8.3*   < > 9.9*   HEMATOCRIT % 27.0*   < > 31.0*   PLATELETS Thousands/uL 258   < > 266   BANDS PCT %  --   --  7   LYMPHO PCT %  --   --  10*   MONO PCT %  --   --  26*   EOS PCT %  --   --  1    < > = values in this interval not displayed.     Results from last 7 days   Lab Units 24  0547 24  0518 24   SODIUM mmol/L 137   < > 133*   POTASSIUM mmol/L 4.0   < > 4.5   CHLORIDE mmol/L 104   < > 100   CO2 mmol/L 26   < > 23   BUN mg/dL 15   < > 18   CREATININE mg/dL 0.56*   < > 0.71   ANION GAP mmol/L 7   < > 10   CALCIUM mg/dL 8.5   < > 8.8   ALBUMIN g/dL  --   --  3.6   TOTAL BILIRUBIN mg/dL  --   --  0.26   ALK PHOS U/L  --   --  56   ALT U/L  --   --  13   AST U/L  --   --  18   GLUCOSE RANDOM mg/dL 121   < > 265*    < > = values in this interval not displayed.     Results from last 7 days   Lab Units 24  2025   INR  0.93     Results from last 7 days   Lab Units 24  1117 24  0639  08/20/24  2157 08/20/24  1551 08/20/24  1110 08/20/24  0745 08/19/24  2051 08/19/24  1616 08/19/24  1112 08/19/24  0656 08/19/24  0011 08/18/24 2002   POC GLUCOSE mg/dl 156* 115 188* 186* 155* 137 132 117 196* 170* 184* 260*     Results from last 7 days   Lab Units 08/20/24  0536   HEMOGLOBIN A1C % 6.4*     Results from last 7 days   Lab Units 08/18/24  2331 08/18/24 2025   LACTIC ACID mmol/L 1.5 2.6*   PROCALCITONIN ng/ml  --  3.31*       Lines/Drains:  Invasive Devices       Peripheral Intravenous Line  Duration             Peripheral IV 08/21/24 Right Antecubital <1 day                          Imaging: No pertinent imaging reviewed.    Recent Cultures (last 7 days):   Results from last 7 days   Lab Units 08/19/24  1236 08/18/24  2219 08/18/24 2025 08/18/24 2019   BLOOD CULTURE  No Growth at 24 hrs.  No Growth at 24 hrs.  --  Escherichia coli* No Growth at 48 hrs.   GRAM STAIN RESULT   --   --  Gram negative rods*  --    URINE CULTURE   --  >100,000 cfu/ml Escherichia coli*  --   --        Last 24 Hours Medication List:   Current Facility-Administered Medications   Medication Dose Route Frequency Provider Last Rate    cefTRIAXone  2,000 mg Intravenous Q24H Mimi Ibrahim PA-C 2,000 mg (08/21/24 1130)    cyanocobalamin  500 mcg Oral Daily Mimi Ibrahim PA-C      divalproex sodium  1,000 mg Oral HS Bhargav Barnes MD      divalproex sodium  500 mg Oral Daily Mimi Ibrahim PA-C      docusate sodium  100 mg Oral BID Mimi Ibrahim PA-C      enoxaparin  40 mg Subcutaneous Daily Mimi Ibrahim PA-C      folic acid  1 mg Oral Daily Mimi Ibrahim PA-C      gabapentin  400 mg Oral TID Mimi Ibrahim PA-C      insulin lispro  1-5 Units Subcutaneous TID AC Mimi Ibrahim PA-C      insulin lispro  1-5 Units Subcutaneous HS Mimi Ibrahim PA-C      lacosamide  150 mg Oral Daily Mimi A Ibrahim, PA-C      lacosamide  200 mg Oral HS Mimi Ibrahim PA-C      levETIRAcetam  1,500 mg Oral Q12H RONALD Mimi Ibrahim,  JAIR      levothyroxine  150 mcg Oral Early Morning Mimi Ibrahim PA-C      lisinopril  5 mg Oral Daily Mimi Ibrahim PA-C      loratadine  10 mg Oral Daily Mimi Ibrahim PA-C      multivitamin stress formula  1 tablet Oral Daily Mimi Ibrahim PA-C      OLANZapine  5 mg Intramuscular Once Bhargav Barnes MD      polyethylene glycol  17 g Oral Daily PRN Mimi Ibrahim PA-C      pravastatin  80 mg Oral Daily With Dinner Mimi Ibrahim PA-C      QUEtiapine  400 mg Oral HS Mimi Ibrahim PA-C      QUEtiapine  200 mg Oral BID before breakfast/lunch Mimi Ibrahim PA-C      senna-docusate sodium  2 tablet Oral BID Mimi Ibrahim PA-C      temazepam  15 mg Oral HS PRN Mimi Ibrahim PA-C      zonisamide  300 mg Oral HS Mimi Ibrahim PA-C          Today, Patient Was Seen By: Bhargav Barnes MD    **Please Note: This note may have been constructed using a voice recognition system.**

## 2024-08-21 NOTE — ASSESSMENT & PLAN NOTE
Patient got agitated started throwing thing and hitting staff yesterday.  Patient was seen at bedside during my encounter he was calm and cooperative and ordered one-to-one and avoid giving any more antipsychotics as patient is high risk of delirium due to multiple antipsychotics.  Later on patient became more agitated, ordered 1 dose of IM Zyprexa  Continue one-to-one and do pharmacological if needed

## 2024-08-22 LAB
ANION GAP SERPL CALCULATED.3IONS-SCNC: 10 MMOL/L (ref 4–13)
BUN SERPL-MCNC: 14 MG/DL (ref 5–25)
CALCIUM SERPL-MCNC: 8.7 MG/DL (ref 8.4–10.2)
CHLORIDE SERPL-SCNC: 106 MMOL/L (ref 96–108)
CO2 SERPL-SCNC: 21 MMOL/L (ref 21–32)
CREAT SERPL-MCNC: 0.58 MG/DL (ref 0.6–1.3)
ERYTHROCYTE [DISTWIDTH] IN BLOOD BY AUTOMATED COUNT: 14.9 % (ref 11.6–15.1)
GFR SERPL CREATININE-BSD FRML MDRD: 113 ML/MIN/1.73SQ M
GLUCOSE SERPL-MCNC: 123 MG/DL (ref 65–140)
GLUCOSE SERPL-MCNC: 132 MG/DL (ref 65–140)
GLUCOSE SERPL-MCNC: 166 MG/DL (ref 65–140)
GLUCOSE SERPL-MCNC: 169 MG/DL (ref 65–140)
GLUCOSE SERPL-MCNC: 210 MG/DL (ref 65–140)
HCT VFR BLD AUTO: 30.8 % (ref 36.5–49.3)
HGB BLD-MCNC: 9.4 G/DL (ref 12–17)
MCH RBC QN AUTO: 26 PG (ref 26.8–34.3)
MCHC RBC AUTO-ENTMCNC: 30.5 G/DL (ref 31.4–37.4)
MCV RBC AUTO: 85 FL (ref 82–98)
PLATELET # BLD AUTO: 291 THOUSANDS/UL (ref 149–390)
PMV BLD AUTO: 10.5 FL (ref 8.9–12.7)
POTASSIUM SERPL-SCNC: 4.3 MMOL/L (ref 3.5–5.3)
RBC # BLD AUTO: 3.61 MILLION/UL (ref 3.88–5.62)
SODIUM SERPL-SCNC: 137 MMOL/L (ref 135–147)
WBC # BLD AUTO: 7.01 THOUSAND/UL (ref 4.31–10.16)

## 2024-08-22 PROCEDURE — 99233 SBSQ HOSP IP/OBS HIGH 50: CPT | Performed by: STUDENT IN AN ORGANIZED HEALTH CARE EDUCATION/TRAINING PROGRAM

## 2024-08-22 PROCEDURE — 80048 BASIC METABOLIC PNL TOTAL CA: CPT | Performed by: STUDENT IN AN ORGANIZED HEALTH CARE EDUCATION/TRAINING PROGRAM

## 2024-08-22 PROCEDURE — 82948 REAGENT STRIP/BLOOD GLUCOSE: CPT

## 2024-08-22 PROCEDURE — 99233 SBSQ HOSP IP/OBS HIGH 50: CPT | Performed by: INTERNAL MEDICINE

## 2024-08-22 PROCEDURE — 85027 COMPLETE CBC AUTOMATED: CPT | Performed by: STUDENT IN AN ORGANIZED HEALTH CARE EDUCATION/TRAINING PROGRAM

## 2024-08-22 RX ADMIN — GABAPENTIN 400 MG: 400 CAPSULE ORAL at 22:07

## 2024-08-22 RX ADMIN — CYANOCOBALAMIN TAB 500 MCG 500 MCG: 500 TAB at 08:24

## 2024-08-22 RX ADMIN — CEFAZOLIN SODIUM 2000 MG: 2 SOLUTION INTRAVENOUS at 14:33

## 2024-08-22 RX ADMIN — GABAPENTIN 400 MG: 400 CAPSULE ORAL at 08:23

## 2024-08-22 RX ADMIN — LEVETIRACETAM 1500 MG: 500 TABLET, FILM COATED ORAL at 08:23

## 2024-08-22 RX ADMIN — LISINOPRIL 5 MG: 5 TABLET ORAL at 08:24

## 2024-08-22 RX ADMIN — DOCUSATE SODIUM 100 MG: 100 CAPSULE, LIQUID FILLED ORAL at 16:51

## 2024-08-22 RX ADMIN — LEVOTHYROXINE SODIUM 150 MCG: 0.15 TABLET ORAL at 06:09

## 2024-08-22 RX ADMIN — DIVALPROEX SODIUM 500 MG: 500 TABLET, EXTENDED RELEASE ORAL at 08:27

## 2024-08-22 RX ADMIN — LEVETIRACETAM 1500 MG: 500 TABLET, FILM COATED ORAL at 22:07

## 2024-08-22 RX ADMIN — LACOSAMIDE 150 MG: 100 TABLET, FILM COATED ORAL at 08:24

## 2024-08-22 RX ADMIN — GABAPENTIN 400 MG: 400 CAPSULE ORAL at 16:51

## 2024-08-22 RX ADMIN — QUETIAPINE 400 MG: 200 TABLET, FILM COATED, EXTENDED RELEASE ORAL at 22:07

## 2024-08-22 RX ADMIN — SENNOSIDES AND DOCUSATE SODIUM 2 TABLET: 50; 8.6 TABLET ORAL at 16:51

## 2024-08-22 RX ADMIN — INSULIN LISPRO 2 UNITS: 100 INJECTION, SOLUTION INTRAVENOUS; SUBCUTANEOUS at 22:11

## 2024-08-22 RX ADMIN — QUETIAPINE FUMARATE 200 MG: 100 TABLET ORAL at 11:44

## 2024-08-22 RX ADMIN — INSULIN LISPRO 1 UNITS: 100 INJECTION, SOLUTION INTRAVENOUS; SUBCUTANEOUS at 18:15

## 2024-08-22 RX ADMIN — DIVALPROEX SODIUM 1000 MG: 500 TABLET, EXTENDED RELEASE ORAL at 22:11

## 2024-08-22 RX ADMIN — SENNOSIDES AND DOCUSATE SODIUM 2 TABLET: 50; 8.6 TABLET ORAL at 08:23

## 2024-08-22 RX ADMIN — DOCUSATE SODIUM 100 MG: 100 CAPSULE, LIQUID FILLED ORAL at 08:23

## 2024-08-22 RX ADMIN — ZONISAMIDE 300 MG: 100 CAPSULE ORAL at 22:07

## 2024-08-22 RX ADMIN — PRAVASTATIN SODIUM 80 MG: 80 TABLET ORAL at 16:55

## 2024-08-22 RX ADMIN — FOLIC ACID 1 MG: 1 TABLET ORAL at 08:24

## 2024-08-22 RX ADMIN — CEFAZOLIN SODIUM 2000 MG: 2 SOLUTION INTRAVENOUS at 06:09

## 2024-08-22 RX ADMIN — CEFAZOLIN SODIUM 2000 MG: 2 SOLUTION INTRAVENOUS at 22:08

## 2024-08-22 RX ADMIN — B-COMPLEX W/ C & FOLIC ACID TAB 1 TABLET: TAB at 08:24

## 2024-08-22 RX ADMIN — ENOXAPARIN SODIUM 40 MG: 40 INJECTION SUBCUTANEOUS at 08:23

## 2024-08-22 RX ADMIN — LACOSAMIDE 200 MG: 100 TABLET, FILM COATED ORAL at 22:07

## 2024-08-22 RX ADMIN — QUETIAPINE FUMARATE 200 MG: 100 TABLET ORAL at 06:09

## 2024-08-22 RX ADMIN — INSULIN LISPRO 1 UNITS: 100 INJECTION, SOLUTION INTRAVENOUS; SUBCUTANEOUS at 11:48

## 2024-08-22 RX ADMIN — LORATADINE 10 MG: 10 TABLET ORAL at 08:26

## 2024-08-22 NOTE — PROGRESS NOTES
[facility]  Progress Note  Name: Jairon Oconnell I  MRN: [id number]  Unit/Bed#: -01 I Date of Admission: 8/18/2024   Date of Service: 8/22/2024 I Hospital Day: 4    Assessment & Plan   * Sepsis (HCC)  Assessment & Plan  Meeting criteria due to being febrile, tachycardic, tachypneic in the setting of a UTI  Procalcitonin mildly elevated, lactic acid initially elevated 2.6, will trend until WNL  Given 30 mg/kg IV fluid bolus in the ED, will continue IV fluid hydration  Started IV antibiotic   Blood culture showed E. coli showed pansensitive   today's update and plan:  Patient has been transitioned to cefazolin by ID   CT scan did not show any renal abscess      Weakness  Assessment & Plan  CTA head and neck negative for stroke   MRI brain  negative for stroke  Patient is communicative better than yesterday, calm and cooperative eating with the help of her biological sister  Significant improvement as he is able to lift up arms and legs and also sustained it      UTI (urinary tract infection)  Assessment & Plan  Urinalysis positive for large leukocytes, occasional bacteria, caregiver reporting patient with increased urinary frequency and incontinence  Continue with IV ceftriaxone 2 g daily  Blood culture showed pansensitive E. Coli  Patient has been transitioned to cefazolin by ID  Follow-up CT abdomen pelvis with IV contrast did not show any renal abscess    Constipation  Assessment & Plan  Tapwater enema x 1 given     Behavior concern in adult  Assessment & Plan  Caregiver reporting more lethargy over the last 2 days from baseline   Continue Seroquel 3 times daily, as needed temazepam, zonisamide  Fall precautions, delirium precautions, provide frequent verbal redirection   as mentioned and generalized convulsive epilepsy      Generalized convulsive epilepsy (HCC)  Assessment & Plan  Patient got agitated started throwing thing and hitting staff yesterday.  Patient was seen at bedside  during my encounter he was calm and cooperative and ordered one-to-one and avoid giving any more antipsychotics as patient is high risk of delirium due to multiple antipsychotics.  Today's update and plan:  Patient has been calm and cooperative but still on one-to-one  Patient sister reported he has 24/7 supervision at home    Type 2 diabetes mellitus with diabetic neuropathy, without long-term current use of insulin ([facility])  Assessment & Plan  Lab Results   Component Value Date    HGBA1C 6.4 (H) 08/20/2024       Recent Labs     08/21/24  1642 08/21/24  2048 08/22/24  0740 08/22/24  1108   POCGLU 161* 185* 132 169*         Blood Sugar Average: Last 72 hrs:  (P) [id number]  Blood glucose checks 4 times daily, hypoglycemia protocol  Hold home metformin for now  ssi               VTE Pharmacologic Prophylaxis: VTE Score: 3 Moderate Risk (Score 3-4) - Pharmacological DVT Prophylaxis Ordered: enoxaparin (Lovenox).    Mobility:   Basic Mobility Inpatient Raw Score: 6  JH-HLM Goal: 2: Bed activities/Dependent transfer  JH-HLM Achieved: 2: Bed activities/Dependent transfer  JH-HLM Goal NOT achieved. Continue with multidisciplinary rounding and encourage appropriate mobility to improve upon JH-HLM goals.    Patient Centered Rounds: I performed bedside rounds with nursing staff today.   Discussions with Specialists or Other Care Team Provider: ID    Education and Discussions with Family / Patient: Updated  (sister) at bedside.      Current Length of Stay: 4 day(s)  Current Patient Status: Inpatient   Certification Statement: The patient will continue to require additional inpatient hospital stay due to IV antibiotic for gram-negative bacteremia  Discharge Plan: Anticipate discharge tomorrow to home.    Code Status: Level 1 - Full Code    Subjective:   Patient is calm and cooperative, well interactive  Able to lift both of her arms and legs and also able to sustain it  denied complaint of chest pain,  shortness of breath    Objective:     Vitals:   Temp (24hrs), Av.2 °F (36.8 °C), Min:97.5 °F (36.4 °C), Max:98.8 °F (37.1 °C)    Temp:  [97.5 °F (36.4 °C)-98.8 °F (37.1 °C)] 98.8 °F (37.1 °C)  HR:  [] 87  Resp:  [16] 16  BP: (125-144)/(70-77) 125/74  SpO2:  [92 %-97 %] 93 %  Body mass index is 23.88 kg/m².     Input and Output Summary (last 24 hours):     Intake/Output Summary (Last 24 hours) at 2024 1211  Last data filed at 2024 2135  Gross per 24 hour   Intake 100 ml   Output 1500 ml   Net -1400 ml       Physical Exam:   Constitutional: no Acute distress  HEENT:no Pallor or icterus  CVS: S1 plus S2  Respiratory: Normal vascular breathe without crackles and wheeze  Gastroenterology: Soft nontender without any palpable mass  Skin: No bruises or ecchymosis  Neurology: Able to communicate and move all of his extremities    Additional Data:     Labs:  Results from last 7 days   Lab Units 24  0510 24  1055 24   WBC Thousand/uL 7.01   < > 9.79   HEMOGLOBIN g/dL 9.4*   < > 9.9*   HEMATOCRIT % 30.8*   < > 31.0*   PLATELETS Thousands/uL 291   < > 266   BANDS PCT %  --   --  7   LYMPHO PCT %  --   --  10*   MONO PCT %  --   --  26*   EOS PCT %  --   --  1    < > = values in this interval not displayed.     Results from last 7 days   Lab Units 24  0510 24  0518 24   SODIUM mmol/L 137   < > 133*   POTASSIUM mmol/L 4.3   < > 4.5   CHLORIDE mmol/L 106   < > 100   CO2 mmol/L 21   < > 23   BUN mg/dL 14   < > 18   CREATININE mg/dL 0.58*   < > 0.71   ANION GAP mmol/L 10   < > 10   CALCIUM mg/dL 8.7   < > 8.8   ALBUMIN g/dL  --   --  3.6   TOTAL BILIRUBIN mg/dL  --   --  0.26   ALK PHOS U/L  --   --  56   ALT U/L  --   --  13   AST U/L  --   --  18   GLUCOSE RANDOM mg/dL 123   < > 265*    < > = values in this interval not displayed.     Results from last 7 days   Lab Units 24  2025   INR  0.93     Results from last 7 days   Lab Units 24  1108  08/22/24  0740 08/21/24  2048 08/21/24  1642 08/21/24  1117 08/21/24  0639 08/20/24  2157 08/20/24  1551 08/20/24  1110 08/20/24  0745 08/19/24  2051 08/19/24  1616   POC GLUCOSE mg/dl 169* 132 185* 161* 156* 115 188* 186* 155* 137 132 117     Results from last 7 days   Lab Units 08/20/24  0536   HEMOGLOBIN A1C % 6.4*     Results from last 7 days   Lab Units 08/18/24  2331 08/18/24 2025   LACTIC ACID mmol/L 1.5 2.6*   PROCALCITONIN ng/ml  --  3.31*       Lines/Drains:  Invasive Devices       Peripheral Intravenous Line  Duration             Peripheral IV 08/21/24 Right Antecubital <1 day                          Imaging: No pertinent imaging reviewed.    Recent Cultures (last 7 days):   Results from last 7 days   Lab Units 08/19/24  1236 08/18/24  2219 08/18/24 2025 08/18/24  2019   BLOOD CULTURE  No Growth at 48 hrs.  No Growth at 48 hrs.  --  Escherichia coli* No Growth at 72 hrs.   GRAM STAIN RESULT   --   --  Gram negative rods*  --    URINE CULTURE   --  >100,000 cfu/ml Escherichia coli*  --   --        Last 24 Hours Medication List:   Current Facility-Administered Medications   Medication Dose Route Frequency Provider Last Rate    cefazolin  2,000 mg Intravenous Q8H Lila Goodman PA-C 2,000 mg (08/22/24 0609)    cyanocobalamin  500 mcg Oral Daily Mimi Ibrahim PA-C      divalproex sodium  1,000 mg Oral HS Bhargav Barnes MD      divalproex sodium  500 mg Oral Daily Mimi Ibrahim PA-C      docusate sodium  100 mg Oral BID Mimi Ibrahim PA-C      enoxaparin  40 mg Subcutaneous Daily Mimi Ibrahim PA-C      folic acid  1 mg Oral Daily Mimi Ibrahim PA-C      gabapentin  400 mg Oral TID Mimi Ibrahim PA-C      insulin lispro  1-5 Units Subcutaneous TID AC Mimi Ibrahim PA-C      insulin lispro  1-5 Units Subcutaneous HS Mimi Ibrahim PA-C      lacosamide  150 mg Oral Daily Mimi Ibrahim PA-C      lacosamide  200 mg Oral HS Mimi Ibrahim PA-C      levETIRAcetam  1,500 mg Oral Q12H Counts include [facility] beds at the [facility] Mimi WOOD  JAIR Ibrahim      levothyroxine  150 mcg Oral Early Morning Mimi Ibrahim PA-C      lisinopril  5 mg Oral Daily Mimi Ibrahim PA-C      loratadine  10 mg Oral Daily Mimi Ibrahim PA-C      multivitamin stress formula  1 tablet Oral Daily Mimi Ibrahim PA-C      polyethylene glycol  17 g Oral Daily PRN Mimi Ibrahim PA-C      pravastatin  80 mg Oral Daily With Dinner Mimi Ibrahim PA-C      QUEtiapine  400 mg Oral HS Mimi Ibrahim PA-C      QUEtiapine  200 mg Oral BID before breakfast/lunch Mimi Ibrahim PA-C      senna-docusate sodium  2 tablet Oral BID Mimi Ibarhim PA-C      temazepam  15 mg Oral HS PRN Mimi Ibrahim PA-C      zonisamide  300 mg Oral HS Mimi Ibrahim PA-C          Today, Patient Was Seen By: Bhargav Barnes MD    **Please Note: This note may have been constructed using a voice recognition system.**

## 2024-08-22 NOTE — ASSESSMENT & PLAN NOTE
Lab Results   Component Value Date    HGBA1C 6.4 (H) 08/20/2024       Recent Labs     08/21/24  1642 08/21/24  2048 08/22/24  0740 08/22/24  1108   POCGLU 161* 185* 132 169*         Blood Sugar Average: Last 72 hrs:  (P) [id number]  Blood glucose checks 4 times daily, hypoglycemia protocol  Hold home metformin for now  ssi

## 2024-08-22 NOTE — ASSESSMENT & PLAN NOTE
CTA head and neck negative for stroke   MRI brain  negative for stroke  Patient is communicative better than yesterday, calm and cooperative eating with the help of her biological sister  Significant improvement as he is able to lift up arms and legs and also sustained it

## 2024-08-22 NOTE — ASSESSMENT & PLAN NOTE
Meeting criteria due to being febrile, tachycardic, tachypneic in the setting of a UTI  Procalcitonin mildly elevated, lactic acid initially elevated 2.6, will trend until WNL  Given 30 mg/kg IV fluid bolus in the ED, will continue IV fluid hydration  Started IV antibiotic   Blood culture showed E. coli showed pansensitive   today's update and plan:  Patient has been transitioned to cefazolin by ID   CT scan did not show any renal abscess

## 2024-08-22 NOTE — PROGRESS NOTES
Progress Note - Infectious Disease   Jairon Oconnell 59 y.o. male MRN: [id number]  Unit/Bed#: -01 Encounter: [id number]      Impression/Plan:  Sepsis. E/b fever, tachycardia. Also with lactic acidosis and elevated procalcitonin. Suspect source is #2 and #3. CT C/A/P showed no definite intra-abdominal abnormalities. However, it did show nonspecific left kidney hypodensities. Patient continued to fever and repeat CT A/P showed mild urothelial thickening of the left renal pelvis and proximal ureter. No evidence of pyelonephritis or renal abscess. Now has remained afebrile for over 24h. Has remained without leukocytosis.   Continue antibiotic as below  Continue to monitor temperature/vitals  Continue to follow CBCD and BMP with AM labs to monitor for potential antimicrobial toxicities and assess for clinical response to antibiotics.      E.coli bacteremia. 1 out of 2 sets positive on admission. No resistance gene detected. Suspect urinary source given presenting urinary symptoms and Ucx also positive for E.coli. CT chest, abdomen, and pelvis did not show any definite intra-abdominal abnormalities, but did show some tiny hypodensities in the left kidney too small to characterize. Repeat CT A/P showed no evidence of renal abscess or pyelonephritis. Blood cultures resulted pan-sensitive. Antibiotics narrowed from ceftriaxone to cefazolin as it should get better renal absorption. Eventual plan to transition to PO antibiotics.   Continue IV cefazolin 2g q8h while patient remains admitted  Transition to PO cephalexin 500mg q6h at discharge to complete a 7d total antibiotic course (through 8/25)     UTI. P/w urinary frequency, incontinence, and lethargy. Found to have #2. UA demonstrated innumerable WBC, large leukocytes, and trace blood. Ucx resulted positive for E.coli as well. CT A/P showed no acute intra-abdominal disease and stable non-obstructing left renal stone. No hydronephrosis. Did show tiny hypodensities of  left mid kidney too small to characterize. Patient continued to fever and a repeat CT A/P was obtained. It showed mild urothelial thickening of the left renal pelvis and proximal ureter. No evidence of pyelonephritis or renal abscess. Now has remained afebrile for over 24h.   Continue antibiotics as above  Continue to monitor fever curve/vitals     T2DM. Most recent A1C 6.7 (2024). Well controlled. Remains a risk factor for development of infection and poor wound healing.   Recommend ongoing tight glycemic control      History of ataxic cerebral palsy and epilepsy. Patient is on Depakote, Keppra, and Vimpat in the outpatient setting. Will check for potential DDI with any antibiotic.      Penicillin allergy. Reaction unknown per patient. Unclear if true allergy. Will avoid for now. Has tolerated IV ceftriaxone and cefazolin without difficulty.   Monitor for any potential reactions    Above plan was discussed in detail with patient at bedside.   Above plan was discussed in detail with primary service attending, who agrees with the plan to continue IV cefazolin while inpatient and transition to PO Cephalexin at discharge.    Antibiotics:  Cefazolin D1    Subjective:  Patient has remained afebrile for over 24h at this time. Denies having any current pain. Exam and interaction limited due to patient's mental status. Tolerating IV cefazolin without development of rashes.     Objective:  Vitals:  Temp:  [97.5 °F (36.4 °C)-98.8 °F (37.1 °C)] 98.8 °F (37.1 °C)  HR:  [] 87  Resp:  [16] 16  BP: (125-144)/(70-77) 125/74  SpO2:  [92 %-97 %] 93 %  Temp (24hrs), Av.2 °F (36.8 °C), Min:97.5 °F (36.4 °C), Max:98.8 °F (37.1 °C)  Current: Temperature: 98.8 °F (37.1 °C)    Physical Exam:   General Appearance:  Chronically ill appearing, debilitated, awake, interactive, nontoxic, no acute distress.   Throat: Oropharynx moist without lesions. No perioral lesions or ulcerations.    Lungs:   Clear to auscultation bilaterally;  no wheezes, rhonchi or rales; respirations unlabored. On room air.    Heart:  RRR; no murmur, rub or gallop.   Abdomen:   Soft, non-tender, non-distended, positive bowel sounds.     Extremities: No clubbing or cyanosis, no edema. Generalized weakness.    Skin: No new rashes, lesions, or draining wounds noted on exposed skin.     Labs, Imaging, & Other studies:   All pertinent labs and imaging studies were personally reviewed  Results from last 7 days   Lab Units 08/22/24  0510 08/21/24  0547 08/20/24  1055   WBC Thousand/uL 7.01 7.64 8.36   HEMOGLOBIN g/dL 9.4* 8.3* 8.8*   PLATELETS Thousands/uL 291 258 247     Results from last 7 days   Lab Units 08/22/24  0510 08/19/24  0518 08/18/24 2019   POTASSIUM mmol/L 4.3   < > 4.5   CHLORIDE mmol/L 106   < > 100   CO2 mmol/L 21   < > 23   BUN mg/dL 14   < > 18   CREATININE mg/dL 0.58*   < > 0.71   EGFR ml/min/1.73sq m 113   < > 104   CALCIUM mg/dL 8.7   < > 8.8   AST U/L  --   --  18   ALT U/L  --   --  13   ALK PHOS U/L  --   --  56    < > = values in this interval not displayed.     Results from last 7 days   Lab Units 08/19/24  1236 08/18/24  2219 08/18/24 2025 08/18/24 2019   BLOOD CULTURE  No Growth at 48 hrs.  No Growth at 48 hrs.  --  Escherichia coli* No Growth at 72 hrs.   GRAM STAIN RESULT   --   --  Gram negative rods*  --    URINE CULTURE   --  >100,000 cfu/ml Escherichia coli*  --   --      Results from last 7 days   Lab Units 08/18/24 2025   PROCALCITONIN ng/ml 3.31*                     Imaging Studies:  I have personally reviewed pertinent imaging study reports and images in PACS.         Lila Goodman PA-C  [facility]

## 2024-08-22 NOTE — ASSESSMENT & PLAN NOTE
Urinalysis positive for large leukocytes, occasional bacteria, caregiver reporting patient with increased urinary frequency and incontinence  Continue with IV ceftriaxone 2 g daily  Blood culture showed pansensitive E. Coli  Patient has been transitioned to cefazolin by ID  Follow-up CT abdomen pelvis with IV contrast did not show any renal abscess

## 2024-08-22 NOTE — ASSESSMENT & PLAN NOTE
Patient got agitated started throwing thing and hitting staff yesterday.  Patient was seen at bedside during my encounter he was calm and cooperative and ordered one-to-one and avoid giving any more antipsychotics as patient is high risk of delirium due to multiple antipsychotics.  Today's update and plan:  Patient has been calm and cooperative but still on one-to-one  Patient sister reported he has 24/7 supervision at home

## 2024-08-23 ENCOUNTER — TELEPHONE (OUTPATIENT)
Age: 59
End: 2024-08-23

## 2024-08-23 VITALS
WEIGHT: 176.1 LBS | OXYGEN SATURATION: 95 % | HEIGHT: 72 IN | RESPIRATION RATE: 18 BRPM | BODY MASS INDEX: 23.85 KG/M2 | HEART RATE: 91 BPM | DIASTOLIC BLOOD PRESSURE: 67 MMHG | SYSTOLIC BLOOD PRESSURE: 127 MMHG | TEMPERATURE: 96.3 F

## 2024-08-23 LAB
GLUCOSE SERPL-MCNC: 136 MG/DL (ref 65–140)
GLUCOSE SERPL-MCNC: 163 MG/DL (ref 65–140)

## 2024-08-23 PROCEDURE — 99239 HOSP IP/OBS DSCHRG MGMT >30: CPT | Performed by: STUDENT IN AN ORGANIZED HEALTH CARE EDUCATION/TRAINING PROGRAM

## 2024-08-23 PROCEDURE — 82948 REAGENT STRIP/BLOOD GLUCOSE: CPT

## 2024-08-23 RX ORDER — CEPHALEXIN 500 MG/1
500 CAPSULE ORAL EVERY 6 HOURS SCHEDULED
Qty: 8 CAPSULE | Refills: 0 | Status: SHIPPED | OUTPATIENT
Start: 2024-08-23 | End: 2024-08-25

## 2024-08-23 RX ORDER — TEMAZEPAM 15 MG/1
15 CAPSULE ORAL
Qty: 10 CAPSULE | Refills: 0 | Status: SHIPPED | OUTPATIENT
Start: 2024-08-23 | End: 2024-09-02

## 2024-08-23 RX ADMIN — INSULIN LISPRO 1 UNITS: 100 INJECTION, SOLUTION INTRAVENOUS; SUBCUTANEOUS at 12:01

## 2024-08-23 RX ADMIN — DIVALPROEX SODIUM 500 MG: 500 TABLET, EXTENDED RELEASE ORAL at 09:05

## 2024-08-23 RX ADMIN — CEFAZOLIN SODIUM 2000 MG: 2 SOLUTION INTRAVENOUS at 15:58

## 2024-08-23 RX ADMIN — CYANOCOBALAMIN TAB 500 MCG 500 MCG: 500 TAB at 08:56

## 2024-08-23 RX ADMIN — B-COMPLEX W/ C & FOLIC ACID TAB 1 TABLET: TAB at 08:56

## 2024-08-23 RX ADMIN — LORATADINE 10 MG: 10 TABLET ORAL at 08:56

## 2024-08-23 RX ADMIN — LACOSAMIDE 150 MG: 100 TABLET, FILM COATED ORAL at 08:56

## 2024-08-23 RX ADMIN — SENNOSIDES AND DOCUSATE SODIUM 2 TABLET: 50; 8.6 TABLET ORAL at 08:56

## 2024-08-23 RX ADMIN — GABAPENTIN 400 MG: 400 CAPSULE ORAL at 08:56

## 2024-08-23 RX ADMIN — FOLIC ACID 1 MG: 1 TABLET ORAL at 08:56

## 2024-08-23 RX ADMIN — GABAPENTIN 400 MG: 400 CAPSULE ORAL at 17:00

## 2024-08-23 RX ADMIN — CEFAZOLIN SODIUM 2000 MG: 2 SOLUTION INTRAVENOUS at 06:30

## 2024-08-23 RX ADMIN — PRAVASTATIN SODIUM 80 MG: 80 TABLET ORAL at 17:00

## 2024-08-23 RX ADMIN — LEVETIRACETAM 1500 MG: 500 TABLET, FILM COATED ORAL at 08:56

## 2024-08-23 RX ADMIN — DOCUSATE SODIUM 100 MG: 100 CAPSULE, LIQUID FILLED ORAL at 08:56

## 2024-08-23 RX ADMIN — QUETIAPINE FUMARATE 200 MG: 100 TABLET ORAL at 11:59

## 2024-08-23 RX ADMIN — LISINOPRIL 5 MG: 5 TABLET ORAL at 08:56

## 2024-08-23 RX ADMIN — ENOXAPARIN SODIUM 40 MG: 40 INJECTION SUBCUTANEOUS at 08:56

## 2024-08-23 NOTE — PLAN OF CARE
Problem: PAIN - ADULT  Goal: Verbalizes/displays adequate comfort level or baseline comfort level  Description: Interventions:  - Encourage patient to monitor pain and request assistance  - Assess pain using appropriate pain scale  - Administer analgesics based on type and severity of pain and evaluate response  - Implement non-pharmacological measures as appropriate and evaluate response  - Consider cultural and social influences on pain and pain management  - Notify physician/advanced practitioner if interventions unsuccessful or patient reports new pain  Outcome: Progressing     Problem: INFECTION - ADULT  Goal: Absence or prevention of progression during hospitalization  Description: INTERVENTIONS:  - Assess and monitor for signs and symptoms of infection  - Monitor lab/diagnostic results  - Monitor all insertion sites, i.e. indwelling lines, tubes, and drains  - [place] appropriate cooling/warming therapies per order  - Administer medications as ordered  - Instruct and encourage patient and family to use good hand hygiene technique  - Identify and instruct in appropriate isolation precautions for identified infection/condition  Outcome: Progressing  Goal: Absence of fever/infection during neutropenic period  Description: INTERVENTIONS:  - Monitor WBC    Outcome: Progressing     Problem: SAFETY ADULT  Goal: Patient will remain free of falls  Description: INTERVENTIONS:  - Educate patient/family on patient safety including physical limitations  - Instruct patient to call for assistance with activity   - Consult OT/PT to assist with strengthening/mobility   - Keep Call bell within reach  - Keep bed low and locked with side rails adjusted as appropriate  - Keep care items and personal belongings within reach  - Initiate and maintain comfort rounds  - Make Fall Risk Sign visible to staff  - Apply yellow socks and bracelet for high fall risk patients  - Consider moving patient to room near nurses  station  Outcome: Progressing  Goal: Maintain or return to baseline ADL function  Description: INTERVENTIONS:  -  Assess patient's ability to carry out ADLs; assess patient's baseline for ADL function and identify physical deficits which impact ability to perform ADLs (bathing, care of mouth/teeth, toileting, grooming, dressing, etc.)  - Assess/evaluate cause of self-care deficits   - Assess range of motion  - Assess patient's mobility; develop plan if impaired  - Assess patient's need for assistive devices and provide as appropriate  - Encourage maximum independence but intervene and supervise when necessary  - Involve family in performance of ADLs  - Assess for home care needs following discharge   - Consider OT consult to assist with ADL evaluation and planning for discharge  - Provide patient education as appropriate  Outcome: Progressing  Goal: Maintains/Returns to pre admission functional level  Description: INTERVENTIONS:  - Perform AM-PAC 6 Click Basic Mobility/ Daily Activity assessment daily.  - Set and communicate daily mobility goal to care team and patient/family/caregiver.   - Collaborate with rehabilitation services on mobility goals if consulted  - Out of bed for toileting  - Record patient progress and toleration of activity level   Outcome: Progressing     Problem: DISCHARGE PLANNING  Goal: Discharge to home or other facility with appropriate resources  Description: INTERVENTIONS:  - Identify barriers to discharge w/patient and caregiver  - Arrange for needed discharge resources and transportation as appropriate  - Identify discharge learning needs (meds, wound care, etc.)  - Arrange for interpretive services to assist at discharge as needed  - Refer to Case Management Department for coordinating discharge planning if the patient needs post-hospital services based on physician/advanced practitioner order or complex needs related to functional status, cognitive ability, or social support  system  Outcome: Progressing     Problem: Knowledge Deficit  Goal: Patient/family/caregiver demonstrates understanding of disease process, treatment plan, medications, and discharge instructions  Description: Complete learning assessment and assess knowledge base.  Interventions:  - Provide teaching at level of understanding  - Provide teaching via preferred learning methods  Outcome: Progressing     Problem: Prexisting or High Potential for Compromised Skin Integrity  Goal: Skin integrity is maintained or improved  Description: INTERVENTIONS:  - Identify patients at risk for skin breakdown  - Assess and monitor skin integrity  - Assess and monitor nutrition and hydration status  - Monitor labs   - Assess for incontinence   - Turn and reposition patient  - Assist with mobility/ambulation  - Relieve pressure over bony prominences  - Avoid friction and shearing  - Provide appropriate hygiene as needed including keeping skin clean and dry  - Evaluate need for skin moisturizer/barrier cream  - Collaborate with interdisciplinary team   - Patient/family teaching  - Consider wound care consult   Outcome: Progressing     Problem: Nutrition/Hydration-ADULT  Goal: Nutrient/Hydration intake appropriate for improving, restoring or maintaining nutritional needs  Description: Monitor and assess patient's nutrition/hydration status for malnutrition. Collaborate with interdisciplinary team and initiate plan and interventions as ordered.  Monitor patient's weight and dietary intake as ordered or per policy. Utilize nutrition screening tool and intervene as necessary. Determine patient's food preferences and provide high-protein, high-caloric foods as appropriate.     INTERVENTIONS:  - Monitor oral intake, urinary output, labs, and treatment plans  - Assess nutrition and hydration status and recommend course of action  - Evaluate amount of meals eaten  - Assist patient with eating if necessary   - Allow adequate time for meals  -  Recommend/ encourage appropriate diets, oral nutritional supplements, and vitamin/mineral supplements  - Order, calculate, and assess calorie counts as needed  - Recommend, monitor, and adjust tube feedings and TPN/PPN based on assessed needs  - Assess need for intravenous fluids  - Provide specific nutrition/hydration education as appropriate  - Include patient/family/caregiver in decisions related to nutrition  Outcome: Progressing

## 2024-08-23 NOTE — TELEPHONE ENCOUNTER
Pt caregiver Betty calling to schedule ED f/u appt. Pt was seen in ED on 8/18 and being discharged today for sepsis secondary to UTI. Pt had CT scan completed which showed:    IMPRESSION:     1. Mild urothelial thickening of the left renal pelvis and proximal ureter is likely infectious/inflammatory. Mild asymmetric fullness of the left renal pelvis and ureter without obstructing calculus. No renal abscess. Couple of nonobstructing 3 mm left   renal calculi. Bladder is distended but unremarkable.     2. Unchanged small right pleural effusion with adjacent compressive atelectasis.        Pt is scheduled for next available in [place] 11/1/24 with AP. Please review to see if pt is scheduled in appropriate timeframe for f/u.    Pt call back- [phone] Betty (caregiver)

## 2024-08-23 NOTE — TELEPHONE ENCOUNTER
Yasmine from [facility] calling to ensure Dr. Estes will be taking over care for patient and therefore would sign orders. Advised that patient is not established yet until appointment Monday. She states she will make sure patient knows he needs to be at that appointment.

## 2024-08-23 NOTE — TELEPHONE ENCOUNTER
Alicia from [facility] called asking if PCP is LUIS Certified.  This RN was unable to speak to that.  Please review and clarify if provider is certified and return call to Alicia.

## 2024-08-23 NOTE — DISCHARGE INSTR - AVS FIRST PAGE
please start taking cephalexin 500mg 4 times a day  to complete a 7d total antibiotic course (through 8/25)

## 2024-08-23 NOTE — DISCHARGE SUMMARY
Medical Problems       Resolved Problems  Date Reviewed: 8/18/2024   None       Discharging Physician / Practitioner: Bhargav Barnes MD  PCP: No primary care provider on file.  Admission Date:   Admission Orders (From admission, onward)       Ordered        08/18/24 2323  INPATIENT ADMISSION  Once                          Discharge Date: 08/23/24    Consultations During Hospital Stay:  Infectious disease    Procedures Performed:   none    Significant Findings / Test Results:   CT abdomen pelvis w contrast   Final Result      1. Mild urothelial thickening of the left renal pelvis and proximal ureter is likely infectious/inflammatory. Mild asymmetric fullness of the left renal pelvis and ureter without obstructing calculus. No renal abscess. Couple of nonobstructing 3 mm left    renal calculi. Bladder is distended but unremarkable.      2. Unchanged small right pleural effusion with adjacent compressive atelectasis.         The study was marked in EPIC for immediate notification.               Resident: RADU CHEEK I, the attending radiologist, have reviewed the images and agree with the final report above.      Workstation performed: [id number]         MRI brain wo contrast   Final Result      No acute infarct, new mass effect or midline shift.      Workstation performed: [id number]         CT chest abdomen pelvis w contrast   Final Result      1. No definite CT findings to account for sepsis.      2. Stable small right pleural effusion with mild right lower lobe compressive atelectasis. No definitive evidence for pneumonia.      3. No acute intra-abdominal abnormality.      4. Large amount of colonic stool suggesting constipation.      Additional incidental findings as above.         Workstation performed: [id number]         CTA head and neck w wo contrast   Final Result      CT Brain:  No acute intracranial abnormality. Cerebellar predominant volume loss is unchanged.      CT Angiography:  Unremarkable  CTA of the head. There is carotid bifurcation atherosclerosis bilaterally in the neck without stenosis by NASCET criteria.                  Workstation performed: [id number]         CT head without contrast   Final Result      No acute intracranial abnormality.                  Workstation performed: [id number]         XR chest 1 view portable   Final Result      Nonspecific patchy right infrahilar opacities, correlate clinically for pneumonia and recommend follow-up to resolution.            Workstation performed: [id number]               Incidental Findings:   none    Test Results Pending at Discharge (will require follow up):   none     Outpatient Tests Requested:  none    Complications: None    Reason for Admission: sudden onset urinary frequency, incontinence, lethargy, increased blood sugar x 2 days.     Hospital Course:   Jairon Oconnell is a 59 y.o. male patient who originally presented to the hospital on 8/18/2024 due to Sepsis secondary to gram-negative bacteremia due to UTI.  Infectious disease consulted recommended per oral Keflex on discharge.  During hospital stay, patient became combative and gave as needed 1 dose of Zyprexa.  He never got any as needed medication for eradication for over a period of 24 to 36 hours.        Condition at Discharge: good    Discharge Day Visit / Exam:   Subjective: Patient is at his baseline.  Calm cooperative.  He is eating fine.  Following commands  Vitals: Blood Pressure: 127/67 (08/23/24 0718)  Pulse: 91 (08/22/24 1500)  Temperature: (!) 96.3 °F (35.7 °C) (08/23/24 0718)  Temp Source: Axillary (08/23/24 0718)  Respirations: 18 (08/22/24 1500)  Height: 6' (182.9 cm) (08/18/24 2331)  Weight - Scale: 79.9 kg (176 lb 1.6 oz) (08/18/24 2331)  SpO2: 95 % (08/22/24 1915)  Exam:   Constitutional: no Acute distress  HEENT: no Pallor or icterus  CVS: S1 plus S2  Respiratory: Normal vascular breathe without crackles and wheeze  Gastroenterology: Soft nontender without any  palpable mass  Skin: No bruises or ecchymosis  Neurology: No focal logical deficit     Discussion with Family: Updated  (caregiver) via phone.  ,dropped a message because she missed my call.    Discharge instructions/Information to patient and family:   See after visit summary for information provided to patient and family.      Provisions for Follow-Up Care:  See after visit summary for information related to follow-up care and any pertinent home health orders.      Mobility at time of Discharge:   Basic Mobility Inpatient Raw Score: 6  JH-HLM Goal: 2: Bed activities/Dependent transfer  JH-HLM Achieved: 2: Bed activities/Dependent transfer  HLM Goal NOT achieved. Continue to encourage mobility in post discharge setting.     Disposition:   Home    Planned Readmission: none     Discharge Statement:  I spent 37 minutes discharging the patient. This time was spent on the day of discharge. I had direct contact with the patient on the day of discharge. Greater than 50% of the total time was spent examining patient, answering all patient questions, arranging and discussing plan of care with patient as well as directly providing post-discharge instructions.  Additional time then spent on discharge activities.    Discharge Medications:  See after visit summary for reconciled discharge medications provided to patient and/or family.      **Please Note: This note may have been constructed using a voice recognition system**

## 2024-08-23 NOTE — PLAN OF CARE
Problem: PAIN - ADULT  Goal: Verbalizes/displays adequate comfort level or baseline comfort level  Description: Interventions:  - Encourage patient to monitor pain and request assistance  - Assess pain using appropriate pain scale  - Administer analgesics based on type and severity of pain and evaluate response  - Implement non-pharmacological measures as appropriate and evaluate response  - Consider cultural and social influences on pain and pain management  - Notify physician/advanced practitioner if interventions unsuccessful or patient reports new pain  Outcome: Progressing     Problem: INFECTION - ADULT  Goal: Absence or prevention of progression during hospitalization  Description: INTERVENTIONS:  - Assess and monitor for signs and symptoms of infection  - Monitor lab/diagnostic results  - Monitor all insertion sites, i.e. indwelling lines, tubes, and drains  - Painter appropriate cooling/warming therapies per order  - Administer medications as ordered  - Instruct and encourage patient and family to use good hand hygiene technique  - Identify and instruct in appropriate isolation precautions for identified infection/condition  Outcome: Progressing

## 2024-08-23 NOTE — CASE MANAGEMENT
Case Management Progress Note    Patient name Jairon Oconnell  Location /-01 MRN [id number]  : 1965 Date 2024       LOS (days): 4  Geometric Mean LOS (GMLOS) (days): 3.6  Days to GMLOS:-0.3        OBJECTIVE:        Current admission status: Inpatient  Preferred Pharmacy:   [facility] - [address]  [address] [place] [id number]  Phone: [phone] Fax: [phone]    Primary Care Provider: No primary care provider on file.    Primary Insurance: MEDICARE  Secondary Insurance[place] [employer]    PROGRESS NOTE:    CM conferred with SLIM-, Nursing and reviewed chart.    Patient remains on 1;1.    CM left a voicemail for Betty Huffman-caregiver at [facility]- alerting to probable d/c tomorrow .    There are several accepting [facility] Providers in [facility] and that leist for choice will be reviewed wit her upon return call.

## 2024-08-23 NOTE — CASE MANAGEMENT
Case Management Discharge Planning Note    Patient name Jairon Oconnell  Location /-01 MRN [id number]  : 1965 Date 2024       Current Admission Date: 2024  Current Admission Diagnosis:Sepsis ([facility])   Patient Active Problem List    Diagnosis Date Noted Date Diagnosed    Weakness 2024     Sepsis ([facility]) 2024     UTI (urinary tract infection) 2024     Constipation 07/10/2024     History of pneumothorax 2024     DM (diabetes mellitus) ([facility]) 2024     History of cardiac arrest 2024     Dysphagia 2024     Closed traumatic fracture of ribs of right side with pneumothorax 2024     Urinary retention 2024     Nondisplaced fracture of greater trochanter of right femur, subsequent encounter for closed fracture with routine healing 2024     Closed nondisplaced fracture of proximal phalanx of left index finger with routine healing, subsequent encounter 10/30/2023     Pressure ulcer of sacral region, stage 3 ([facility]) 2023     Ambulatory dysfunction 2022     Social problem 2021     Hypomagnesemia 2021     Thrombocytopathia ([facility]) 2020     Hyponatremia 2019     Metabolic encephalopathy 2019     Seborrheic dermatitis of scalp 2019     Nearsightedness 2019     Behavior concern in adult 2019     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     Vitamin B12 deficiency 2017     HLD (hyperlipidemia) 2016     Vitamin D deficiency 2016     Type 2 diabetes mellitus with diabetic neuropathy, without long-term current use of insulin ([facility]) 06/15/2016     Acquired hypothyroidism 06/15/2016     Cataract 2013     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    Primary Insurance: MEDICARE  Secondary Insurance: [place] [employer]    DISCHARGE DETAILS:    Discharge planning discussed with:: caregiver Betty  Freedom of Choice: Yes  Comments - Freedom of Choice: CM maintained freedom of choice as it pertains to discharge planning. Spoke to patient's caregiver Betty who patient lives with, CM reviewed HHC options with Betty, she [facility], as [facility] could provide SLP. Betty reports she can pick patient up around 1630 this afternoon. Betty advised that she did not need patient to be off of the 1:1 prior to discharge, she reports that patient can have behaviours when in an unfamiliar environment. Betty also reports patient's PCP is Dr. Calvin Torres from [facility] PCP office in [place].   CM contacted family/caregiver?: Yes  Were Treatment Team discharge recommendations reviewed with patient/caregiver?: Yes  Did patient/caregiver verbalize understanding of patient care needs?: Yes  Were patient/caregiver advised of the risks associated with not following Treatment Team discharge recommendations?: Yes    Contacts  Patient Contacts: betty Huffman (Care Giver) [phone] (H)  Relationship to Patient:: Other (Comment) (caregiver)  Contact Method: Phone  Phone Number: [phone]  Reason/Outcome: Continuity of Care, Emergency Contact, Referral, Discharge Planning    Requested Home Health Care         Is the patient interested in HHC at discharge?: Yes  Home Health Discipline requested:: Nursing, Speech Language Pathology  Home Health Agency Name:: Other ([facility])  HHA External Referral Reason (only applicable if  external HHA name selected): Services not provided in network or near patient location  Home Health Follow-Up Provider:: PCP  Home Health Services Needed:: Evaluate Functional Status and Safety, Other (comment), Diabetes Management (SLP)  Homebound Criteria Met:: Requires the Assistance of Another Person for Safe Ambulation or to Leave the Home, Uses an Assist Device (i.e. cane, walker, etc)  Supporting Clincal Findings:: Cognitive Deficit Requiring the Assistance of Others, Limited Endurance    DME Referral Provided  Referral made for DME?: No    Other Referral/Resources/Interventions Provided:  Interventions: HHC  Referral Comments: [facility] reserved in [facility] per patient caregiver preference and AVS updated to reflect the same. Fabrizio advised via AIDIN message that patient was discharging to home today. Also advised per conversating with Betty that patient is usually not willing to participate with PT/OT and they are just interested in SLP.    Would you like to participate in our [facility] service program?  : No - Declined    Treatment Team Recommendation: Home with Home Health Care  Discharge Destination Plan:: Home with Home Health Care  Transport at Discharge : Family    IMM Given (Date):: 08/23/24  IMM Given to:: Family    Additional Comments: SLIM and bedside RN advised via [facility] Secure chat of patient's caregiver able to pick patient up around 1630 pm.

## 2024-08-24 LAB
BACTERIA BLD CULT: NORMAL

## 2024-08-26 ENCOUNTER — TELEPHONE (OUTPATIENT)
Dept: NEUROLOGY | Facility: CLINIC | Age: 59
End: 2024-08-26

## 2024-08-26 ENCOUNTER — TELEMEDICINE (OUTPATIENT)
Dept: NEUROLOGY | Facility: CLINIC | Age: 59
End: 2024-08-26
Payer: MEDICARE

## 2024-08-26 DIAGNOSIS — M81.0 OSTEOPOROSIS: ICD-10-CM

## 2024-08-26 DIAGNOSIS — G40.309 GENERALIZED CONVULSIVE EPILEPSY (HCC): Primary | Chronic | ICD-10-CM

## 2024-08-26 PROCEDURE — G2211 COMPLEX E/M VISIT ADD ON: HCPCS | Performed by: PSYCHIATRY & NEUROLOGY

## 2024-08-26 PROCEDURE — 99214 OFFICE O/P EST MOD 30 MIN: CPT | Performed by: PSYCHIATRY & NEUROLOGY

## 2024-08-26 RX ORDER — LACOSAMIDE 200 MG/1
TABLET ORAL
Qty: 30 TABLET | Refills: 5 | Status: SHIPPED | OUTPATIENT
Start: 2024-08-26

## 2024-08-26 RX ORDER — LACOSAMIDE 150 MG/1
TABLET ORAL
Qty: 30 TABLET | Refills: 5 | Status: SHIPPED | OUTPATIENT
Start: 2024-08-26

## 2024-08-26 NOTE — PROGRESS NOTES
Virtual Regular Visit  Name: Jairon Oconnell      : 1965      MRN: [id number]  Encounter Provider: Keyur Banerjee MD  Encounter Date: 2024   Encounter department: NEUROLOGY [facility]    Verification of patient location:    Patient is located at Home in the following state in which I hold an active license [place]    Assessment & Plan   1. Generalized convulsive epilepsy (HCC)  Assessment & Plan:  Overall, he has been doing quite well with his current medications and since living in his new home with new caregiver. They have not noted any additional seizures a and his prior frequent falls have no longer been occurring.     -- because he is doing well, I will have him continue his current medications unchanged. In the future, we could potentially consider trying to simplify his medications.     -- I will have him get blood work, as below to recheck levels and for regular monitoring. It will also be important to check a vitamin D level to help look into his bone health. He may benefit from DXA in the future.   Orders:  -     Valproic acid level, total; Future  -     Zonisamide level; Future  -     Levetiracetam level; Future  -     Lacosamide; Future  -     Vitamin D 25 hydroxy; Future  -     lacosamide (VIMPAT) 200 mg tablet; Take 1 tab (200 mg) at night.  -     lacosamide (VIMPAT) 150 mg tablet; Take 1 tab (150 mg) in the morning.  2. Osteoporosis  -     Vitamin D 25 hydroxy; Future        Encounter provider Keyur Banerjee MD    The patient was identified by name and date of birth. Jairon Oconnell was informed that this is a telemedicine visit and that the visit is being conducted through the Epic Embedded platform. He agrees to proceed..  My office door was closed. No one else was in the room.  He acknowledged consent and understanding of privacy and security of the video platform. The patient has agreed to participate and understands they can discontinue the visit at any time.    Patient is  aware this is a billable service.     History of Present Illness     Jairon Oconnell is a 59 y.o. male who presents     Current seizure medications:  1. Zonisamide 300 mg nightly  2. Lacosamide 150 mg in the morning and 200 mg at night  3. Levetiracetam 1500 mg twice a day  4. Depakote (Brand) 500 mg in the morning and 1000 mg at night   Other medications as per [facility].    Since his last visit, he has been doing really well. His caregiver denies any seizures. He also has not had any difficulty with falls. His caregiver denies any problems or side effects to his medications at this point.     He was just in the hospital from 8/19-8/23 due to a UTI resulting in sepsis. He was having some mental status change and urinary incontinence leading up to the hospitalization.     He had some shakiness when he came out of the hospital last week, but with getting his regular medications, he seems back to his normal self. He is back to walking normally and getting up on his own.     Prior Medications: none other than current medications     His history was also obtained from his caregiver, who was also present via video.       Review of Systems   Constitutional:  Negative for appetite change, fatigue and fever.   HENT: Negative.  Negative for hearing loss, tinnitus, trouble swallowing and voice change.    Eyes: Negative.  Negative for photophobia, pain and visual disturbance.   Respiratory: Negative.  Negative for shortness of breath.    Cardiovascular: Negative.  Negative for palpitations.   Gastrointestinal: Negative.  Negative for nausea and vomiting.   Endocrine: Negative.  Negative for cold intolerance.   Genitourinary: Negative.  Negative for dysuria, frequency and urgency.   Musculoskeletal:  Negative for back pain, gait problem, myalgias, neck pain and neck stiffness.   Skin: Negative.  Negative for rash.   Allergic/Immunologic: Negative.    Neurological: Negative.  Negative for dizziness, tremors, seizures, syncope,  facial asymmetry, speech difficulty, weakness, light-headedness, numbness and headaches.   Hematological: Negative.  Does not bruise/bleed easily.   Psychiatric/Behavioral: Negative.  Negative for confusion, hallucinations and sleep disturbance.        Objective     There were no vitals taken for this visit.  Physical Exam    Visit Time  Total Visit Duration: 15 min

## 2024-08-26 NOTE — PATIENT INSTRUCTIONS
-- Continue to give his medications unchanged.     -- please get some blood work to recheck his medication levels when you are able. Ideally, this would be drawn first thing in the morning before he takes his morning dose of medications.

## 2024-08-26 NOTE — TELEPHONE ENCOUNTER
Called to schedule patient for their follow-up appointment in four months with Dr. Banerjee or HUNTER Gómez, no answer, voicemail not set up, unable to leave message.  A copy of the AVS and lab orders placed in the mail to patient.

## 2024-08-26 NOTE — TELEPHONE ENCOUNTER
Called and spoke to Betty, pt  was scheduled as a f/u, pt needed to be scheduled as a NP, in a sooner slot.    Pt is scheduled for 10/3/2024 at 2;20pm with [facility], pt is added to the wait list for all offices if something sooner should come up that works for them  Caretaker requested a later afternoon appt for pt.

## 2024-08-27 NOTE — ASSESSMENT & PLAN NOTE
Overall, he has been doing quite well with his current medications and since living in his new home with new caregiver. They have not noted any additional seizures a and his prior frequent falls have no longer been occurring.     -- because he is doing well, I will have him continue his current medications unchanged. In the future, we could potentially consider trying to simplify his medications.     -- I will have him get blood work, as below to recheck levels and for regular monitoring. It will also be important to check a vitamin D level to help look into his bone health. He may benefit from DXA in the future.

## 2024-09-04 ENCOUNTER — TRANSITIONAL CARE MANAGEMENT (OUTPATIENT)
Dept: INTERNAL MEDICINE CLINIC | Facility: CLINIC | Age: 59
End: 2024-09-04

## 2024-09-06 ENCOUNTER — OFFICE VISIT (OUTPATIENT)
Dept: INTERNAL MEDICINE CLINIC | Facility: CLINIC | Age: 59
End: 2024-09-06
Payer: MEDICARE

## 2024-09-06 VITALS
SYSTOLIC BLOOD PRESSURE: 128 MMHG | HEART RATE: 85 BPM | BODY MASS INDEX: 23.75 KG/M2 | HEIGHT: 72 IN | TEMPERATURE: 97.4 F | OXYGEN SATURATION: 98 % | DIASTOLIC BLOOD PRESSURE: 82 MMHG | WEIGHT: 175.38 LBS

## 2024-09-06 DIAGNOSIS — I10 PRIMARY HYPERTENSION: Chronic | ICD-10-CM

## 2024-09-06 DIAGNOSIS — E53.8 VITAMIN B12 DEFICIENCY: Chronic | ICD-10-CM

## 2024-09-06 DIAGNOSIS — G40.309 GENERALIZED CONVULSIVE EPILEPSY (HCC): Chronic | ICD-10-CM

## 2024-09-06 DIAGNOSIS — F69 BEHAVIOR CONCERN IN ADULT: ICD-10-CM

## 2024-09-06 DIAGNOSIS — E03.9 ACQUIRED HYPOTHYROIDISM: Chronic | ICD-10-CM

## 2024-09-06 DIAGNOSIS — Z09 HOSPITAL DISCHARGE FOLLOW-UP: Primary | ICD-10-CM

## 2024-09-06 DIAGNOSIS — R53.1 WEAKNESS: ICD-10-CM

## 2024-09-06 DIAGNOSIS — G83.89 CEREBRAL PARESIS WITH HOMOLATERAL ATAXIA (HCC): Chronic | ICD-10-CM

## 2024-09-06 DIAGNOSIS — Z12.5 SCREENING FOR PROSTATE CANCER: ICD-10-CM

## 2024-09-06 DIAGNOSIS — E11.40 TYPE 2 DIABETES MELLITUS WITH DIABETIC NEUROPATHY, WITHOUT LONG-TERM CURRENT USE OF INSULIN (HCC): ICD-10-CM

## 2024-09-06 DIAGNOSIS — M79.674 PAIN OF RIGHT GREAT TOE: ICD-10-CM

## 2024-09-06 DIAGNOSIS — K59.01 SLOW TRANSIT CONSTIPATION: Chronic | ICD-10-CM

## 2024-09-06 DIAGNOSIS — G80.4 ATAXIC CEREBRAL PALSY (HCC): Chronic | ICD-10-CM

## 2024-09-06 DIAGNOSIS — Z13.1 SCREENING FOR DIABETES MELLITUS: ICD-10-CM

## 2024-09-06 DIAGNOSIS — E53.8 FOLIC ACID DEFICIENCY: ICD-10-CM

## 2024-09-06 DIAGNOSIS — R26.2 AMBULATORY DYSFUNCTION: Chronic | ICD-10-CM

## 2024-09-06 DIAGNOSIS — E78.2 MIXED HYPERLIPIDEMIA: Chronic | ICD-10-CM

## 2024-09-06 DIAGNOSIS — E83.42 HYPOMAGNESEMIA: Chronic | ICD-10-CM

## 2024-09-06 DIAGNOSIS — E55.9 VITAMIN D DEFICIENCY: Chronic | ICD-10-CM

## 2024-09-06 DIAGNOSIS — Z13.29 SCREENING FOR THYROID DISORDER: ICD-10-CM

## 2024-09-06 DIAGNOSIS — J30.2 SEASONAL ALLERGIES: ICD-10-CM

## 2024-09-06 DIAGNOSIS — D50.8 IRON DEFICIENCY ANEMIA SECONDARY TO INADEQUATE DIETARY IRON INTAKE: Chronic | ICD-10-CM

## 2024-09-06 PROBLEM — Z87.09 HISTORY OF PNEUMOTHORAX: Chronic | Status: ACTIVE | Noted: 2024-06-28

## 2024-09-06 PROBLEM — K59.00 CONSTIPATION: Chronic | Status: ACTIVE | Noted: 2024-07-10

## 2024-09-06 PROBLEM — Z86.74 HISTORY OF CARDIAC ARREST: Chronic | Status: ACTIVE | Noted: 2024-06-03

## 2024-09-06 PROBLEM — E11.9 DM (DIABETES MELLITUS) (HCC): Status: RESOLVED | Noted: 2024-06-14 | Resolved: 2024-09-06

## 2024-09-06 PROBLEM — E86.0 ACUTE DEHYDRATION: Status: RESOLVED | Noted: 2019-09-25 | Resolved: 2019-09-30

## 2024-09-06 PROBLEM — A41.51 SEPSIS DUE TO ESCHERICHIA COLI WITHOUT ACUTE ORGAN DYSFUNCTION (HCC): Chronic | Status: ACTIVE | Noted: 2024-08-18

## 2024-09-06 PROBLEM — A41.51 SEPSIS DUE TO ESCHERICHIA COLI WITHOUT ACUTE ORGAN DYSFUNCTION (HCC): Status: ACTIVE | Noted: 2024-08-18

## 2024-09-06 PROCEDURE — 99495 TRANSJ CARE MGMT MOD F2F 14D: CPT | Performed by: NURSE PRACTITIONER

## 2024-09-06 RX ORDER — GABAPENTIN 300 MG/1
300 CAPSULE ORAL 3 TIMES DAILY
COMMUNITY

## 2024-09-06 RX ORDER — LANCETS 33 GAUGE
EACH MISCELLANEOUS
Qty: 200 EACH | Refills: 3 | Status: SHIPPED | OUTPATIENT
Start: 2024-09-06

## 2024-09-06 RX ORDER — DIVALPROEX SODIUM 250 MG/1
TABLET, EXTENDED RELEASE ORAL
COMMUNITY
Start: 2024-08-22

## 2024-09-06 RX ORDER — HYDROXYZINE PAMOATE 25 MG/1
25 CAPSULE ORAL EVERY 12 HOURS
COMMUNITY
Start: 2024-08-22

## 2024-09-06 RX ORDER — GABAPENTIN 300 MG/1
CAPSULE ORAL
COMMUNITY
Start: 2024-08-22 | End: 2024-09-06

## 2024-09-06 RX ORDER — TEMAZEPAM 7.5 MG/1
CAPSULE ORAL
COMMUNITY
Start: 2024-07-17

## 2024-09-06 NOTE — PATIENT INSTRUCTIONS
Problem List Items Addressed This Visit          Cardiovascular and Mediastinum    Primary hypertension (Chronic)     Continue  Lisinopril         Relevant Orders    Comprehensive metabolic panel       Endocrine    Type 2 diabetes mellitus with diabetic neuropathy, without long-term current use of insulin (HCC) (Chronic)       Lab Results   Component Value Date    HGBA1C 6.4 (H) 08/20/2024     Continue  Gabapentin  Metformin  Will check labs         Relevant Medications    OneTouch Delica Lancets 33G MISC    Acquired hypothyroidism (Chronic)     Continue  Levothyroxine  Will check labs         Relevant Orders    TSH, 3rd generation with Free T4 reflex       Nervous and Auditory    Generalized convulsive epilepsy (HCC) (Chronic)     8/23/2024 discharge summary  Overall, he has been doing quite well with his current medications and since living in his new home with new caregiver. They have not noted any additional seizures a and his prior frequent falls have no longer been occurring.   -- because he is doing well, I will have him continue his current medications unchanged. In the future, we could potentially consider trying to simplify his medications.   -- I will have him get blood work, as below to recheck levels and for regular monitoring. It will also be important to check a vitamin D level to help look into his bone health. He may benefit from DXA in the future.     8/26/2024 neurology televisit  Overall, he has been doing quite well with his current medications and since living in his new home with new caregiver. They have not noted any additional seizures a and his prior frequent falls have no longer been occurring.      -- because he is doing well, I will have him continue his current medications unchanged. In the future, we could potentially consider trying to simplify his medications.      -- I will have him get blood work, as below to recheck levels and for regular monitoring. It will also be important to  check a vitamin D level to help look into his bone health. He may benefit from DXA in the future.   Orders:  -     Valproic acid level, total; Future  -     Zonisamide level; Future  -     Levetiracetam level; Future  -     Lacosamide; Future  -     Vitamin D 25 hydroxy; Future  -     lacosamide (VIMPAT) 200 mg tablet; Take 1 tab (200 mg) at night.  -     lacosamide (VIMPAT) 150 mg tablet; Take 1 tab (150 mg) in the morning.    9/6/2024  Patient follows with neurology  Continue  Depakote  Gabapentin  Vimpat  Keppra  Zonegran           Relevant Medications    divalproex sodium (DEPAKOTE ER) 250 mg 24 hr tablet    gabapentin (NEURONTIN) 300 mg capsule    Ataxic cerebral palsy (HCC) (Chronic)    Relevant Medications    divalproex sodium (DEPAKOTE ER) 250 mg 24 hr tablet    gabapentin (NEURONTIN) 300 mg capsule    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HCC) (Chronic)    Relevant Medications    divalproex sodium (DEPAKOTE ER) 250 mg 24 hr tablet    gabapentin (NEURONTIN) 300 mg capsule       Behavioral Health    Behavior concern in adult (Chronic)     Continue  Vistaril  Temazepam  Seroquel              Blood    Iron deficiency anemia secondary to inadequate dietary iron intake (Chronic)     Will monitor  Start ascorbic acid daily          Relevant Medications    ascorbic acid (VITAMIN C) 1000 MG TBCR    Other Relevant Orders    CBC and differential    Iron Panel (Includes Ferritin, Iron Sat%, Iron, and TIBC)    Folate    Vitamin B12       Care Coordination    Ambulatory dysfunction (Chronic)       Surgery/Wound/Pain    Pain of right great toe (Chronic)     Stubbed toe, nail cracked             Neurology/Sleep    Weakness (Chronic)     8/19/2024 neurology  59 y.o. male with cerebral palsy, generalized convulsive epilepsy (on Depakote, lacosamide, Keppra, and zonisamide), behavioral disturbances, history of cardiac arrest, HTN, HLD, DM2, and hypothyroidism who [facility] due to elevated blood  sugar (>230 at home), urinary frequency, incontinence, and lethargy for the past 2 days.     Upon arrival to the ED, /67 with max .  HR 120s and febrile with Tmax 103 °F.    Labs revealed glucose 265, procalcitonin 3.31, lactic acid 2.6, UA positive for leukocytes/blood, and 1 of 2 blood cultures positive for gram-negative rods.  Patient was started on ceftriaxone.  CT head negative for acute intracranial abnormalities.       Neurology was consulted on 8/19 due to generalized weakness (R>L).  On exam, patient has generalized weakness and is lethargic, but antigravity BUEs with bilateral asterixis.  Brisk withdrawal to noxious stimuli in all extremities and movements appear symmetric.     Etiology for generalized weakness likely due to UTI.  Low suspicion for stroke/TIA, but cannot fully rule out.     Plan:  - Will hold off on formal stroke pathway until MRI is completed.  - MRI brain wo contrast pending  - Start aspirin 81 mg daily  - Will defer statin therapy at this time  - Lipid panel and A1C pending  - PT/OT  - ID following for UTI; currently on ceftriaxone  - Frequent neuro checks. Continue to monitor and notify neurology with any changes.   - Medical management and supportive care per primary team. Correction of any metabolic or infectious disturbances                Other    Mixed hyperlipidemia (Chronic)     Continue  Simvastatin         Relevant Orders    Lipid panel    Vitamin B12 deficiency (Chronic)     Continue  B12 supplement         Relevant Orders    Vitamin B12    Vitamin D deficiency (Chronic)    Relevant Orders    Vitamin D 25 hydroxy    Hypomagnesemia (Chronic)     Continue  Magnesium supplement  Check labs         Relevant Orders    Magnesium    Constipation (Chronic)     Continue  Colace  MiraLAX  [facility] discharge follow-up - Primary (Chronic)     8/18/2024 in the ED  Hyperglycemia - Symptomatic        Per caregiver at bedside pt has had BG >230 at home which is  unusual for him. Caregiver admits pt has been experiencing shaking and increased fatigue today along with urinary incontinence over the past few days.    59-year-old male presenting to the emergency department for evaluation of high blood sugar.  Patient has caregivers at home.  They are concerned because he noticed that his blood sugar was over 200 today.  He has a history of diabetes on metformin but his blood sugars are typically well-controlled.  His symptoms are in the context of several days of shaking/chills, as well as urinary frequency and incontinence which is not normal for him.  Patient himself has had no pain, complains of no fevers shortness of breath abdominal pain nausea or vomiting.    8/21/2024 neurology  MRI brain findings reviewed with Dr. Valentino, no evidence of acute or chronic infarcts, mass effect or midline shift.   Patient no longer requires Aspirin 81 mg daily and Lipitor 40 mg daily from a neurological perspective. No need to continue stroke pathway at this time. No further inpatient neurological recommendations at this time. Patient already has an appointment to see Dr. Banerjee on 8/26/24 at 10:30 AM for epilepsy management.     8/23/2024 discharge summary  Reason for Admission: sudden onset urinary frequency, incontinence, lethargy, increased blood sugar x 2 days.      Hospital Course:   Jairon Oconnell is a 59 y.o. male patient who originally presented to the hospital on 8/18/2024 due to Sepsis secondary to gram-negative bacteremia due to UTI.  Infectious disease consulted recommended per oral Keflex on discharge.  During hospital stay, patient became combative and gave as needed 1 dose of Zyprexa.  He never got any as needed medication for eradication for over a period of 24 to 36 hours.         Folic acid deficiency (Chronic)     Continue  Folic acid supplement         Relevant Orders    Folate    Seasonal allergies (Chronic)     Continue  Claritin         Screening for thyroid  disorder (Chronic)    Relevant Orders    TSH, 3rd generation with Free T4 reflex    Screening for prostate cancer    Relevant Orders    PSA, Total Screen    Screening for diabetes mellitus    Relevant Orders    Hemoglobin A1C

## 2024-09-06 NOTE — PROGRESS NOTES
Transition of Care Visit  Name: Jairon Oconnell      : 1965      MRN: [id number]  Encounter Provider: HUNTER Rose  Encounter Date: 2024   Encounter department: [facility]    Assessment & Plan   1. Hospital discharge follow-up  Assessment & Plan:  2024 in the ED  Hyperglycemia - Symptomatic        Per caregiver at bedside pt has had BG >230 at home which is unusual for him. Caregiver admits pt has been experiencing shaking and increased fatigue today along with urinary incontinence over the past few days.    59-year-old male presenting to the emergency department for evaluation of high blood sugar.  Patient has caregivers at home.  They are concerned because he noticed that his blood sugar was over 200 today.  He has a history of diabetes on metformin but his blood sugars are typically well-controlled.  His symptoms are in the context of several days of shaking/chills, as well as urinary frequency and incontinence which is not normal for him.  Patient himself has had no pain, complains of no fevers shortness of breath abdominal pain nausea or vomiting.    2024 neurology  MRI brain findings reviewed with Dr. Valentino, no evidence of acute or chronic infarcts, mass effect or midline shift.   Patient no longer requires Aspirin 81 mg daily and Lipitor 40 mg daily from a neurological perspective. No need to continue stroke pathway at this time. No further inpatient neurological recommendations at this time. Patient already has an appointment to see Dr. Banerjee on 24 at 10:30 AM for epilepsy management.     2024 discharge summary  Reason for Admission: sudden onset urinary frequency, incontinence, lethargy, increased blood sugar x 2 days.      Hospital Course:   Jairon Oconnell is a 59 y.o. male patient who originally presented to the hospital on 2024 due to Sepsis secondary to gram-negative bacteremia due to UTI.  Infectious disease consulted recommended  per oral Keflex on discharge.  During hospital stay, patient became combative and gave as needed 1 dose of Zyprexa.  He never got any as needed medication for eradication for over a period of 24 to 36 hours.  2. Weakness  Assessment & Plan:  8/19/2024 neurology  59 y.o. male with cerebral palsy, generalized convulsive epilepsy (on Depakote, lacosamide, Keppra, and zonisamide), behavioral disturbances, history of cardiac arrest, HTN, HLD, DM2, and hypothyroidism who presented to [facility] on 8/18/2024 due to elevated blood sugar (>230 at home), urinary frequency, incontinence, and lethargy for the past 2 days.     Upon arrival to the ED, /67 with max .  HR 120s and febrile with Tmax 103 °F.    Labs revealed glucose 265, procalcitonin 3.31, lactic acid 2.6, UA positive for leukocytes/blood, and 1 of 2 blood cultures positive for gram-negative rods.  Patient was started on ceftriaxone.  CT head negative for acute intracranial abnormalities.       Neurology was consulted on 8/19 due to generalized weakness (R>L).  On exam, patient has generalized weakness and is lethargic, but antigravity BUEs with bilateral asterixis.  Brisk withdrawal to noxious stimuli in all extremities and movements appear symmetric.     Etiology for generalized weakness likely due to UTI.  Low suspicion for stroke/TIA, but cannot fully rule out.     Plan:  - Will hold off on formal stroke pathway until MRI is completed.  - MRI brain wo contrast pending  - Start aspirin 81 mg daily  - Will defer statin therapy at this time  - Lipid panel and A1C pending  - PT/OT  - ID following for UTI; currently on ceftriaxone  - Frequent neuro checks. Continue to monitor and notify neurology with any changes.   - Medical management and supportive care per primary team. Correction of any metabolic or infectious disturbances      3. Generalized convulsive epilepsy (HCC)  Assessment & Plan:  8/23/2024 discharge summary  Overall, he has been doing quite  well with his current medications and since living in his new home with new caregiver. They have not noted any additional seizures a and his prior frequent falls have no longer been occurring.   -- because he is doing well, I will have him continue his current medications unchanged. In the future, we could potentially consider trying to simplify his medications.   -- I will have him get blood work, as below to recheck levels and for regular monitoring. It will also be important to check a vitamin D level to help look into his bone health. He may benefit from DXA in the future.     8/26/2024 neurology televisit  Overall, he has been doing quite well with his current medications and since living in his new home with new caregiver. They have not noted any additional seizures a and his prior frequent falls have no longer been occurring.      -- because he is doing well, I will have him continue his current medications unchanged. In the future, we could potentially consider trying to simplify his medications.      -- I will have him get blood work, as below to recheck levels and for regular monitoring. It will also be important to check a vitamin D level to help look into his bone health. He may benefit from DXA in the future.   Orders:  -     Valproic acid level, total; Future  -     Zonisamide level; Future  -     Levetiracetam level; Future  -     Lacosamide; Future  -     Vitamin D 25 hydroxy; Future  -     lacosamide (VIMPAT) 200 mg tablet; Take 1 tab (200 mg) at night.  -     lacosamide (VIMPAT) 150 mg tablet; Take 1 tab (150 mg) in the morning.    9/6/2024  Patient follows with neurology  Continue  Depakote  Gabapentin  Vimpat  Keppra  Zonegran    4. Primary hypertension  Assessment & Plan:  Continue  Lisinopril  Orders:  -     Comprehensive metabolic panel; Future  5. Acquired hypothyroidism  Assessment & Plan:  Continue  Levothyroxine  Will check labs  Orders:  -     TSH, 3rd generation with Free T4 reflex;  Future  6. Type 2 diabetes mellitus with diabetic neuropathy, without long-term current use of insulin (HCC)  Assessment & Plan:    Lab Results   Component Value Date    HGBA1C 6.4 (H) 08/20/2024     Continue  Gabapentin  Metformin  Will check labs  Orders:  -     OneTouch Delica Lancets 33G MISC; Check blood sugars twice daily. Please substitute with appropriate alternative as covered by patient's insurance. Dx: E11.65  7. Ataxic cerebral palsy (HCC)  8. 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HCC)  9. Behavior concern in adult  Assessment & Plan:  Continue  Vistaril  Temazepam  Seroquel    10. Ambulatory dysfunction  11. Slow transit constipation  Assessment & Plan:  Continue  Colace  MiraLAX  Senokot  12. Mixed hyperlipidemia  Assessment & Plan:  Continue  Simvastatin  Orders:  -     Lipid panel; Future  13. Hypomagnesemia  Assessment & Plan:  Continue  Magnesium supplement  Check labs  Orders:  -     Magnesium; Future  14. Vitamin B12 deficiency  Assessment & Plan:  Continue  B12 supplement  Orders:  -     Vitamin B12; Future  15. Vitamin D deficiency  -     Vitamin D 25 hydroxy; Future  16. Folic acid deficiency  Assessment & Plan:  Continue  Folic acid supplement  Orders:  -     Folate; Future  17. Seasonal allergies  Assessment & Plan:  Continue  Claritin  18. Pain of right great toe  Assessment & Plan:  Stubbed toe, nail cracked   19. Iron deficiency anemia secondary to inadequate dietary iron intake  Assessment & Plan:  Will monitor  Start ascorbic acid daily   Orders:  -     ascorbic acid (VITAMIN C) 1000 MG TBCR; Take 1 tablet (1,000 mg total) by mouth daily  -     CBC and differential; Future  -     Iron Panel (Includes Ferritin, Iron Sat%, Iron, and TIBC); Future  -     Folate; Future  -     Vitamin B12; Future  20. Screening for prostate cancer  -     PSA, Total Screen; Future  21. Screening for diabetes mellitus  -     Hemoglobin A1C; Future  22. Screening for thyroid disorder  -     TSH, 3rd  generation with Free T4 reflex; Future         History of Present Illness     Transitional Care Management Review:   Jairon Oconnell is a 59 y.o. male here for TCM follow up.     During the TCM phone call patient stated:  TCM Call     Date and time call was made  9/4/2024  9:41 AM    Hospital care reviewed  Records reviewed    Patient [facility] hospitial[facility]    Date of Admission  08/18/24    Date of discharge  08/23/24    Diagnosis  Sepsis    Disposition  Home    Were the patients medications reviewed and updated  Yes    Current Symptoms  None    Fatigue severity  Mild      TCM Call     Post hospital issues  None    Should patient be enrolled in anticoag monitoring?  No    Scheduled for follow up?  Yes    Did you obtain your prescribed medications  Yes    Do you need help managing your prescriptions or medications  No    Is transportation to your appointment needed  No    I have advised the patient to call PCP with any new or worsening symptoms  Ce Wilcox MA    Living Arrangements  Family members    Support System  Home care staff    Are you recieving any outpatient services  No    Are you recieving home care services  No    Types of home care services  Nurse visit    Are you using any community resources  Yes    Which resources are you recieving  WAIVER    Current waiver services  Yes    What services are you recieving  CAREGIVER    Have you fallen in the last 12 months  No    Interperter language line needed  No    Counseling  Caregiver    Comments  Will reach out to Pt caregiver to schedule TCM        The patient is here today to discuss his recent hospitalization.   I reviewed their medical conditions, medications, laboratory and radiology studies.  I reviewed their scheduled future lab studies with the patient.   The patient's current treatment plan is effective.   There is no change in the current therapy.   I ask the patient to continue their current therapy.   The patient voiced their  understanding and agreement.   Follow up after May 8th for his Medicare annual wellness.  Please continue to the PORCH section of the note for details of today's visit.             Review of Systems   Constitutional:  Negative for activity change, chills, fatigue and fever.   HENT:  Negative for rhinorrhea and sore throat.    Eyes:  Negative for pain.   Respiratory:  Negative for cough and shortness of breath.    Cardiovascular:  Negative for chest pain, palpitations and leg swelling.   Gastrointestinal:  Negative for abdominal pain, constipation, diarrhea, nausea and vomiting.   Genitourinary:  Negative for difficulty urinating, flank pain, frequency and urgency.   Musculoskeletal:  Negative for gait problem, joint swelling and myalgias.   Skin:  Negative for color change.   Neurological:  Negative for dizziness, weakness, light-headedness and headaches.   Psychiatric/Behavioral:  Negative for sleep disturbance. The patient is not nervous/anxious.    All other systems reviewed and are negative.    Objective     /82 (BP Location: Left arm, Patient Position: Sitting)   Pulse 85   Temp (!) 97.4 °F (36.3 °C) (Tympanic)   Ht 6' (1.829 m)   Wt 79.5 kg (175 lb 6 oz)   SpO2 98%   BMI 23.79 kg/m²     Physical Exam  Vitals and nursing note reviewed.   Constitutional:       General: He is awake.      Appearance: Normal appearance. He is well-developed.   HENT:      Head: Normocephalic and atraumatic.      Nose: Nose normal.      Mouth/Throat:      Mouth: Mucous membranes are moist.   Eyes:      Conjunctiva/sclera: Conjunctivae normal.   Cardiovascular:      Rate and Rhythm: Normal rate and regular rhythm.      Pulses: Normal pulses.      Heart sounds: Normal heart sounds. No murmur heard.  Pulmonary:      Effort: Pulmonary effort is normal. No respiratory distress.      Breath sounds: Normal breath sounds.   Abdominal:      General: Bowel sounds are normal.      Palpations: Abdomen is soft.      Tenderness: There  is no abdominal tenderness.   Musculoskeletal:      Cervical back: Neck supple.      Right lower leg: No edema.      Left lower leg: No edema.        Feet:    Skin:     General: Skin is warm and dry.   Neurological:      Mental Status: He is alert and oriented to person, place, and time.      Motor: Motor function is intact.      Coordination: Coordination is intact.      Gait: Gait is intact.   Psychiatric:         Attention and Perception: Attention normal.         Mood and Affect: Mood normal.         Speech: Speech normal.         Behavior: Behavior normal. Behavior is cooperative.         Thought Content: Thought content normal.         Cognition and Memory: Cognition normal.         Judgment: Judgment normal.       Medications have been reviewed by provider in current encounter    Administrative Statements   I have spent a total time of 45 minutes in caring for this patient on the day of the visit/encounter including Diagnostic results, Prognosis, Risks and benefits of tx options, Instructions for management, Patient and family education, Importance of tx compliance, Risk factor reductions, Impressions, Counseling / Coordination of care, Documenting in the medical record, Reviewing / ordering tests, medicine, procedures  , and Obtaining or reviewing history  .

## 2024-09-06 NOTE — ASSESSMENT & PLAN NOTE
8/19/2024 neurology  59 y.o. male with cerebral palsy, generalized convulsive epilepsy (on Depakote, lacosamide, Keppra, and zonisamide), behavioral disturbances, history of cardiac arrest, HTN, HLD, DM2, and hypothyroidism who presented to [facility] on 8/18/2024 due to elevated blood sugar (>230 at home), urinary frequency, incontinence, and lethargy for the past 2 days.     Upon arrival to the ED, /67 with max .  HR 120s and febrile with Tmax 103 °F.    Labs revealed glucose 265, procalcitonin 3.31, lactic acid 2.6, UA positive for leukocytes/blood, and 1 of 2 blood cultures positive for gram-negative rods.  Patient was started on ceftriaxone.  CT head negative for acute intracranial abnormalities.       Neurology was consulted on 8/19 due to generalized weakness (R>L).  On exam, patient has generalized weakness and is lethargic, but antigravity BUEs with bilateral asterixis.  Brisk withdrawal to noxious stimuli in all extremities and movements appear symmetric.     Etiology for generalized weakness likely due to UTI.  Low suspicion for stroke/TIA, but cannot fully rule out.     Plan:  - Will hold off on formal stroke pathway until MRI is completed.  - MRI brain wo contrast pending  - Start aspirin 81 mg daily  - Will defer statin therapy at this time  - Lipid panel and A1C pending  - PT/OT  - ID following for UTI; currently on ceftriaxone  - Frequent neuro checks. Continue to monitor and notify neurology with any changes.   - Medical management and supportive care per primary team. Correction of any metabolic or infectious disturbances

## 2024-09-06 NOTE — ASSESSMENT & PLAN NOTE
8/23/2024 discharge summary  Overall, he has been doing quite well with his current medications and since living in his new home with new caregiver. They have not noted any additional seizures a and his prior frequent falls have no longer been occurring.   -- because he is doing well, I will have him continue his current medications unchanged. In the future, we could potentially consider trying to simplify his medications.   -- I will have him get blood work, as below to recheck levels and for regular monitoring. It will also be important to check a vitamin D level to help look into his bone health. He may benefit from DXA in the future.     8/26/2024 neurology televisit  Overall, he has been doing quite well with his current medications and since living in his new home with new caregiver. They have not noted any additional seizures a and his prior frequent falls have no longer been occurring.      -- because he is doing well, I will have him continue his current medications unchanged. In the future, we could potentially consider trying to simplify his medications.      -- I will have him get blood work, as below to recheck levels and for regular monitoring. It will also be important to check a vitamin D level to help look into his bone health. He may benefit from DXA in the future.   Orders:  -     Valproic acid level, total; Future  -     Zonisamide level; Future  -     Levetiracetam level; Future  -     Lacosamide; Future  -     Vitamin D 25 hydroxy; Future  -     lacosamide (VIMPAT) 200 mg tablet; Take 1 tab (200 mg) at night.  -     lacosamide (VIMPAT) 150 mg tablet; Take 1 tab (150 mg) in the morning.    9/6/2024  Patient follows with neurology  Continue  Depakote  Gabapentin  Vimpat  Keppra  Zonegran

## 2024-09-06 NOTE — ASSESSMENT & PLAN NOTE
Lab Results   Component Value Date    HGBA1C 6.4 (H) 08/20/2024     Continue  Gabapentin  Metformin  Will check labs

## 2024-09-06 NOTE — ASSESSMENT & PLAN NOTE
8/18/2024 in the ED  Hyperglycemia - Symptomatic        Per caregiver at bedside pt has had BG >230 at home which is unusual for him. Caregiver admits pt has been experiencing shaking and increased fatigue today along with urinary incontinence over the past few days.    59-year-old male presenting to the emergency department for evaluation of high blood sugar.  Patient has caregivers at home.  They are concerned because he noticed that his blood sugar was over 200 today.  He has a history of diabetes on metformin but his blood sugars are typically well-controlled.  His symptoms are in the context of several days of shaking/chills, as well as urinary frequency and incontinence which is not normal for him.  Patient himself has had no pain, complains of no fevers shortness of breath abdominal pain nausea or vomiting.    8/21/2024 neurology  MRI brain findings reviewed with Dr. Valentino, no evidence of acute or chronic infarcts, mass effect or midline shift.   Patient no longer requires Aspirin 81 mg daily and Lipitor 40 mg daily from a neurological perspective. No need to continue stroke pathway at this time. No further inpatient neurological recommendations at this time. Patient already has an appointment to see Dr. Banerjee on 8/26/24 at 10:30 AM for epilepsy management.     8/23/2024 discharge summary  Reason for Admission: sudden onset urinary frequency, incontinence, lethargy, increased blood sugar x 2 days.      Hospital Course:   Jairon Oconnell is a 59 y.o. male patient who originally presented to the hospital on 8/18/2024 due to Sepsis secondary to gram-negative bacteremia due to UTI.  Infectious disease consulted recommended per oral Keflex on discharge.  During hospital stay, patient became combative and gave as needed 1 dose of Zyprexa.  He never got any as needed medication for eradication for over a period of 24 to 36 hours.

## 2024-09-06 NOTE — ASSESSMENT & PLAN NOTE
8/23/2024 discharge summary  Meeting criteria due to being febrile, tachycardic, tachypneic in the setting of a UTI  Procalcitonin mildly elevated, lactic acid initially elevated 2.6, will trend until WNL  Given 30 mg/kg IV fluid bolus in the ED, will continue IV fluid hydration  Started IV antibiotic   Blood culture showed E. coli showed pansensitive   today's update and plan:  Patient has been transitioned to cefazolin by ID   CT scan did not show any renal abscess

## 2024-09-18 PROBLEM — N39.0 UTI (URINARY TRACT INFECTION): Status: RESOLVED | Noted: 2024-08-18 | Resolved: 2024-09-18

## 2024-09-26 NOTE — ASSESSMENT & PLAN NOTE
"Continue home medications  Holding home Depakote as patient reportedly requires \"brand name\" Depakote  " 76

## 2024-09-27 ENCOUNTER — TELEPHONE (OUTPATIENT)
Age: 59
End: 2024-09-27

## 2024-09-27 DIAGNOSIS — N30.00 ACUTE CYSTITIS WITHOUT HEMATURIA: Primary | ICD-10-CM

## 2024-09-27 NOTE — TELEPHONE ENCOUNTER
Betty, Care Giver of pt, called in stating that she believes pt has come down with another UTI.     Betty wanted to schedule an appt for the pt. Attempted to schedule, but the options for Lo didn't appear right in EPIC, very limited.     Attempted to reach out to Clerical, but no one was available at the moment.     Please advise & call Betty back to schedule pt for an appt to see Emily. Thank you!    betty Huffman   [phone]

## 2024-09-28 ENCOUNTER — HOSPITAL ENCOUNTER (EMERGENCY)
Facility: HOSPITAL | Age: 59
Discharge: HOME | End: 2024-09-28
Attending: EMERGENCY MEDICINE
Payer: MEDICARE

## 2024-09-28 ENCOUNTER — APPOINTMENT (OUTPATIENT)
Dept: LAB | Facility: CLINIC | Age: 59
End: 2024-09-28
Payer: MEDICARE

## 2024-09-28 VITALS
SYSTOLIC BLOOD PRESSURE: 153 MMHG | HEART RATE: 102 BPM | TEMPERATURE: 98.1 F | RESPIRATION RATE: 16 BRPM | DIASTOLIC BLOOD PRESSURE: 68 MMHG | OXYGEN SATURATION: 98 % | BODY MASS INDEX: 22.84 KG/M2 | WEIGHT: 168.43 LBS

## 2024-09-28 DIAGNOSIS — N39.0 URINARY TRACT INFECTION: Primary | ICD-10-CM

## 2024-09-28 DIAGNOSIS — N30.00 ACUTE CYSTITIS WITHOUT HEMATURIA: ICD-10-CM

## 2024-09-28 LAB
BACTERIA UR QL AUTO: ABNORMAL /HPF
BACTERIA UR QL AUTO: ABNORMAL /HPF
BILIRUB UR QL STRIP: NEGATIVE
BILIRUB UR QL STRIP: NEGATIVE
CLARITY UR: ABNORMAL
CLARITY UR: CLEAR
COLOR UR: ABNORMAL
COLOR UR: YELLOW
GLUCOSE SERPL-MCNC: 157 MG/DL (ref 65–140)
GLUCOSE UR STRIP-MCNC: NEGATIVE MG/DL
GLUCOSE UR STRIP-MCNC: NEGATIVE MG/DL
HGB UR QL STRIP.AUTO: ABNORMAL
HGB UR QL STRIP.AUTO: NEGATIVE
KETONES UR STRIP-MCNC: NEGATIVE MG/DL
KETONES UR STRIP-MCNC: NEGATIVE MG/DL
LEUKOCYTE ESTERASE UR QL STRIP: ABNORMAL
LEUKOCYTE ESTERASE UR QL STRIP: ABNORMAL
MUCOUS THREADS UR QL AUTO: ABNORMAL
NITRITE UR QL STRIP: NEGATIVE
NITRITE UR QL STRIP: POSITIVE
NON-SQ EPI CELLS URNS QL MICRO: ABNORMAL /HPF
NON-SQ EPI CELLS URNS QL MICRO: ABNORMAL /HPF
PH UR STRIP.AUTO: 6 [PH]
PH UR STRIP.AUTO: 6.5 [PH]
PROT UR STRIP-MCNC: ABNORMAL MG/DL
PROT UR STRIP-MCNC: ABNORMAL MG/DL
RBC #/AREA URNS AUTO: ABNORMAL /HPF
RBC #/AREA URNS AUTO: ABNORMAL /HPF
SP GR UR STRIP.AUTO: 1.02 (ref 1–1.03)
SP GR UR STRIP.AUTO: 1.02 (ref 1–1.03)
TRANS CELLS #/AREA URNS HPF: PRESENT /[HPF]
UROBILINOGEN UR STRIP-ACNC: <2 MG/DL
UROBILINOGEN UR STRIP-ACNC: <2 MG/DL
WBC #/AREA URNS AUTO: ABNORMAL /HPF
WBC #/AREA URNS AUTO: ABNORMAL /HPF

## 2024-09-28 PROCEDURE — 81001 URINALYSIS AUTO W/SCOPE: CPT | Performed by: EMERGENCY MEDICINE

## 2024-09-28 PROCEDURE — 87086 URINE CULTURE/COLONY COUNT: CPT | Performed by: EMERGENCY MEDICINE

## 2024-09-28 PROCEDURE — 87186 SC STD MICRODIL/AGAR DIL: CPT | Performed by: EMERGENCY MEDICINE

## 2024-09-28 PROCEDURE — 87147 CULTURE TYPE IMMUNOLOGIC: CPT | Performed by: EMERGENCY MEDICINE

## 2024-09-28 PROCEDURE — 82948 REAGENT STRIP/BLOOD GLUCOSE: CPT

## 2024-09-28 PROCEDURE — 99284 EMERGENCY DEPT VISIT MOD MDM: CPT | Performed by: EMERGENCY MEDICINE

## 2024-09-28 PROCEDURE — 87186 SC STD MICRODIL/AGAR DIL: CPT

## 2024-09-28 PROCEDURE — 87077 CULTURE AEROBIC IDENTIFY: CPT | Performed by: EMERGENCY MEDICINE

## 2024-09-28 PROCEDURE — 99283 EMERGENCY DEPT VISIT LOW MDM: CPT

## 2024-09-28 PROCEDURE — 87077 CULTURE AEROBIC IDENTIFY: CPT

## 2024-09-28 PROCEDURE — 81001 URINALYSIS AUTO W/SCOPE: CPT

## 2024-09-28 PROCEDURE — 87086 URINE CULTURE/COLONY COUNT: CPT

## 2024-09-28 RX ORDER — CEPHALEXIN 500 MG/1
500 CAPSULE ORAL EVERY 6 HOURS SCHEDULED
Qty: 20 CAPSULE | Refills: 0 | Status: SHIPPED | OUTPATIENT
Start: 2024-09-28 | End: 2024-10-03

## 2024-09-28 RX ADMIN — CEPHALEXIN 500 MG: 250 CAPSULE ORAL at 11:56

## 2024-09-28 NOTE — ED PROVIDER NOTES
Final diagnoses:   Urinary tract infection     ED Disposition       ED Disposition   Discharge    Condition   Stable    Date/Time   Sat Sep 28, 2024 12:00 PM    Comment   Jairon Oconnell discharge to home/self care.                   Assessment & Plan       Medical Decision Making  59-year-old male brought in for evaluation with concerns for possible urinary infection.  On exam, patient with normal vitals, no acute distress.  Urinalysis was obtained which showed moderate leukocytes with innumerable WBCs and occasional mucus threads.  Only occasional bacteria noted.  However, given that the patient is symptomatic, will treat as a presumed urinary infection pending cultures.  Patient given Keflex for empiric treatment as his previous urine cultures grew out a pan-susceptible E. coli.  Lab work was ordered to evaluate patient's kidney function and electrolytes, but patient refused blood work at this time.  Patient overall well-appearing, and given that his symptoms have only been going on for 2 days, low suspicion for any other acute pathology at this time.  Patient discharged home in stable condition with symptomatic care instructions and strict ED return precautions.    Amount and/or Complexity of Data Reviewed  Labs: ordered. Decision-making details documented in ED Course.    Risk  Prescription drug management.        ED Course as of 09/28/24 1212   Sat Sep 28, 2024   1125 Leukocytes, UA(!): Moderate   1144 WBC, UA(!): Innumerable   1144 MUCUS THREADS(!): Occasional   1144 Bacteria, UA: Occasional   1144 Epithelial Cells: Occasional       Medications   cephalexin (KEFLEX) capsule 500 mg (500 mg Oral Given 9/28/24 1156)       ED Risk Strat Scores                           SBIRT 22yo+      Flowsheet Row Most Recent Value   Initial Alcohol Screen: US AUDIT-C     1. How often do you have a drink containing alcohol? 0 Filed at: 09/28/2024 1049   2. How many drinks containing alcohol do you have on a typical day you are  "drinking?  0 Filed at: 09/28/2024 1049   3a. Male UNDER 65: How often do you have five or more drinks on one occasion? 0 Filed at: 09/28/2024 1049   Audit-C Score 0 Filed at: 09/28/2024 1049   ADRIAN: How many times in the past year have you...    Used an illegal drug or used a prescription medication for non-medical reasons? Never Filed at: 09/28/2024 1049                            History of Present Illness       Chief Complaint   Patient presents with    Possible UTI     Patient arrives to ED escorted with caregiver \"reports incontinence and dysuria x 2 days, and elevated glucose at home\"       Past Medical History:   Diagnosis Date    ADRIANA (acute kidney injury) ([facility]) 09/24/2019    Bacteremia due to Gram-positive bacteria 09/07/2021    Buttock wound, left, subsequent encounter 11/05/2021    COVID-19     CP (cerebral palsy) ([facility])     Depression     Diabetes ([facility])     Disease of thyroid gland     Gait disorder     Gluteal abscess 09/06/2021    Hypertension     Kidney failure     Kidney stones     Moderate protein-calorie malnutrition ([facility]) 12/22/2021    Osteoporosis     Pressure injury of left buttock, stage 4 ([facility]) 03/09/2022    Psychiatric disorder     Scoliosis     Seizures ([facility])     Sepsis ([facility]) 09/25/2019    Thyroid disease     UTI (urinary tract infection)       Past Surgical History:   Procedure Laterality Date    APPENDECTOMY      IR CHEST TUBE PLACEMENT  6/14/2024    IR PICC PLACEMENT SINGLE LUMEN  9/13/2021    IR PICC PLACEMENT SINGLE LUMEN  9/16/2021      History reviewed. No pertinent family history.   Social History     Tobacco Use    Smoking status: Never     Passive exposure: Never    Smokeless tobacco: Never   Vaping Use    Vaping status: Never Used   Substance Use Topics    Alcohol use: Never    Drug use: No      E-Cigarette/Vaping    E-Cigarette Use Never User       E-Cigarette/Vaping Substances    Nicotine No     THC No     CBD No     Flavoring No     Other No     Unknown No       I have reviewed " and agree with the history as documented.     59-year-old male with a history of cerebral palsy and diabetes brought in by his caregiver for evaluation of a possible urinary tract infection.  She reports that over the past 2 days, patient has had urinary incontinence and some reported dysuria.  She states that his blood sugar level this morning was also elevated into the 200s which she was told could be a sign of an infection.  The patient has not had any fevers that she is aware of.  She states that he did sleep more than normal yesterday, but he has otherwise been acting appropriately.  Patient does have a history of urinary infections previously.        Review of Systems   Unable to perform ROS: Patient nonverbal   Constitutional:  Negative for fever.   Endocrine:        Hyperglycemia   Genitourinary:  Positive for dysuria.        Urinary incontinence           Objective       ED Triage Vitals [09/28/24 1045]   Temperature Pulse Blood Pressure Respirations SpO2 Patient Position - Orthostatic VS   98.1 °F (36.7 °C) 102 153/68 16 98 % Sitting      Temp Source Heart Rate Source BP Location FiO2 (%) Pain Score    Temporal Monitor Left arm -- --      Vitals      Date and Time Temp Pulse SpO2 Resp BP Pain Score FACES Pain Rating User   09/28/24 1045 98.1 °F (36.7 °C) 102 98 % 16 153/68 -- -- FB            Physical Exam  Vitals and nursing note reviewed.   Constitutional:       General: He is awake. He is not in acute distress.     Appearance: He is not toxic-appearing.   HENT:      Head: Normocephalic and atraumatic.   Eyes:      General: Vision grossly intact. Gaze aligned appropriately.   Cardiovascular:      Rate and Rhythm: Normal rate and regular rhythm.   Pulmonary:      Effort: Pulmonary effort is normal. No respiratory distress.   Musculoskeletal:      Cervical back: Full passive range of motion without pain and neck supple.   Skin:     General: Skin is warm and dry.   Neurological:      General: No focal  deficit present.      Mental Status: He is alert and oriented to person, place, and time.         Results Reviewed       Procedure Component Value Units Date/Time    Urine Microscopic [[id number]]  (Abnormal) Collected: 09/28/24 1112    Lab Status: Final result Specimen: Urine, Clean Catch Updated: 09/28/24 1143     RBC, UA 4-10 /hpf      WBC, UA Innumerable /hpf      Epithelial Cells Occasional /hpf      Bacteria, UA Occasional /hpf      MUCUS THREADS Occasional     Transitional Epithelial Cells Present    Urine culture [[id number]] Collected: 09/28/24 1112    Lab Status: In process Specimen: Urine, Clean Catch Updated: 09/28/24 1143    UA w Reflex to Microscopic w Reflex to Culture [[id number]]  (Abnormal) Collected: 09/28/24 1112    Lab Status: Final result Specimen: Urine, Clean Catch Updated: 09/28/24 1124     Color, UA Yellow     Clarity, UA Turbid     Specific Gravity, UA 1.019     pH, UA 6.5     Leukocytes, UA Moderate     Nitrite, UA Negative     Protein,  (3+) mg/dl      Glucose, UA Negative mg/dl      Ketones, UA Negative mg/dl      Urobilinogen, UA <2.0 mg/dl      Bilirubin, UA Negative     Occult Blood, UA Trace    CBC and differential [[id number]]     Lab Status: No result Specimen: Blood     Comprehensive metabolic panel [[id number]]     Lab Status: No result Specimen: Blood     Fingerstick Glucose (POCT) [[id number]]  (Abnormal) Collected: 09/28/24 1048    Lab Status: Final result Specimen: Blood Updated: 09/28/24 1049     POC Glucose 157 mg/dl             No orders to display       Procedures    ED Medication and Procedure Management   Prior to Admission Medications   Prescriptions Last Dose Informant Patient Reported? Taking?   Blood Glucose Monitoring Suppl (OneTouch Verio Reflect) w/Device KIT  Care Giver No No   Sig: Check blood sugars twice daily. Please substitute with appropriate alternative as covered by patient's insurance. Dx: E11.65   Depakote 500 MG DR tablet  Care Giver No No   Sig:  Take 1 tab in the morning and 2 tabs at night. Brand necessary   Disposable Gloves (Safe-Sense Glove-Blk-Nitrl-L) MISC  Care Giver No No   Sig: Use 1 each 3 (three) times a day as needed (care taker assisting with soiled briefs)   Hyprom-Naphaz-Polysorb-Zn Sulf (CLEAR EYES COMPLETE OP)   Yes No   Sig: Apply to eye   Incontinence Supply Disposable (Briefs Overnight Medium) MISC  Care Giver No No   Sig: Use in the morning   Multiple Vitamin (Daily-Reina) TABS  Care Giver No No   Sig: Take 1 tablet by mouth daily Take at 8 AM   Nutritional Supplements (Ensure Max Protein) LIQD  Care Giver No No   Sig: Take 1 Bottle by mouth in the morning   OneTouch Delica Lancets 33G MISC   No No   Sig: Check blood sugars twice daily. Please substitute with appropriate alternative as covered by patient's insurance. Dx: E11.65   QUEtiapine (SEROquel XR) 400 mg 24 hr tablet  Care Giver No No   Sig: Take 1 tablet (400 mg total) by mouth daily at bedtime   QUEtiapine (SEROquel) 200 mg tablet  Care Giver No No   Sig: Take 1 tablet (200 mg total) by mouth 2 (two) times a day before breakfast and lunch   ascorbic acid (VITAMIN C) 1000 MG TBCR   No No   Sig: Take 1 tablet (1,000 mg total) by mouth daily   cyanocobalamin (VITAMIN B-12) 500 MCG tablet   No No   Sig: Take 1 tablet (500 mcg total) by mouth daily   divalproex sodium (DEPAKOTE ER) 250 mg 24 hr tablet   Yes No   docusate sodium (COLACE) 100 mg capsule  Care Giver No No   Sig: Take 1 capsule (100 mg total) by mouth 2 (two) times a day   folic acid (FOLVITE) 1 mg tablet  Care Giver No No   Sig: GIVE 1 TABLET BY MOUTH DAILY DX METABOLIC ENCEPHALOPATHY   gabapentin (NEURONTIN) 300 mg capsule   Yes No   Sig: Take 300 mg by mouth 3 (three) times a day   glucose blood (OneTouch Verio) test strip  Care Giver No No   Sig: Check blood sugars once daily. Please substitute with appropriate alternative as covered by patient's insurance. Dx: E11.65   glucose blood (OneTouch Verio) test strip  Care  Giver No No   Sig: Check blood sugars twice daily. Please substitute with appropriate alternative as covered by patient's insurance. Dx: E11.65   hydrOXYzine pamoate (VISTARIL) 25 mg capsule   Yes No   Sig: Take 25 mg by mouth every 12 (twelve) hours   lacosamide (VIMPAT) 150 mg tablet   No No   Sig: Take 1 tab (150 mg) in the morning.   lacosamide (VIMPAT) 200 mg tablet   No No   Sig: Take 1 tab (200 mg) at night.   levETIRAcetam (KEPPRA) 750 mg tablet  Care Giver No No   Sig: Give 2 tabs (1500 mg) by mouth twice a day   levothyroxine 150 mcg tablet  Care Giver No No   Sig: Take 1 tablet (150 mcg total) by mouth daily   lisinopril (ZESTRIL) 5 mg tablet  Care Giver No No   Sig: Take 1 tablet (5 mg total) by mouth daily   loratadine (CLARITIN) 10 mg tablet  Care Giver No No   Sig: Take 1 tablet (10 mg total) by mouth daily   magnesium Oxide (MAG-OX) 400 mg TABS   No No   Sig: Take 1 tablet (400 mg total) by mouth 2 (two) times a day   metFORMIN (GLUCOPHAGE) 1000 MG tablet  Care Giver No No   Sig: GIVE 1 TABLET BY MOUTH TWICE DAILY WITH MEALS FOR DIABETES   polyethylene glycol (MIRALAX) 17 g packet   No No   Sig: Take 17 g by mouth daily for 14 days   senna-docusate sodium (SENOKOT S) 8.6-50 mg per tablet   No No   Sig: Take 2 tablets by mouth 2 (two) times a day   simvastatin (ZOCOR) 40 mg tablet  Care Giver No No   Sig: GIVE 1 TABLET BY MOUTH AT BEDTIME FOR CHOLESTEROL   temazepam (RESTORIL) 15 mg capsule  Care Giver No No   Sig: Take 1 capsule (15 mg total) by mouth daily at bedtime as needed for sleep for up to 10 days   Patient taking differently: Take 7.5 mg by mouth daily at bedtime Taking 3 tabs of 7.5 mg for a total dose of(22.5 mg)   temazepam (RESTORIL) 7.5 mg capsule   Yes No   zonisamide (ZONEGRAN) 100 mg capsule  Care Giver No No   Sig: Take 3 capsules (300 mg total) by mouth daily at bedtime      Facility-Administered Medications: None     Patient's Medications   Discharge Prescriptions    CEPHALEXIN  (KEFLEX) 500 MG CAPSULE    Take 1 capsule (500 mg total) by mouth every 6 (six) hours for 5 days       Start Date: 9/28/2024 End Date: 10/3/2024       Order Dose: 500 mg       Quantity: 20 capsule    Refills: 0     No discharge procedures on file.  ED SEPSIS DOCUMENTATION   Time reflects when diagnosis was documented in both MDM as applicable and the Disposition within this note       Time User Action Codes Description Comment    9/28/2024 12:00 PM Roxanna Smith Add [N39.0] Urinary tract infection                  Roxanna Smith,   09/28/24 1215

## 2024-09-30 ENCOUNTER — TELEPHONE (OUTPATIENT)
Age: 59
End: 2024-09-30

## 2024-09-30 LAB
BACTERIA UR CULT: ABNORMAL
BACTERIA UR CULT: ABNORMAL

## 2024-09-30 RX ORDER — ACETAMINOPHEN 325 MG/1
TABLET ORAL
COMMUNITY
Start: 2024-09-19

## 2024-09-30 NOTE — TELEPHONE ENCOUNTER
Pts care giver Betty called stating that the pt does not take the medication listed below so please please change status to Discontinued. She has to return it every time they send it out.      divalproex sodium (DEPAKOTE ER) 250 mg 24 hr tablet      Also Betty stated that the insurance either called or sent a letter asking why was this pt taking brand name instead of the genetic form of:    Depakote 500 MG DR tablet       Sig: Take 1 tab in the morning and 2 tabs at night. Brand necessary       Sent to pharmacy as: Depakote 500 MG Oral Tablet Delayed Release.    Betty stated she would call pts sister to find out which one it was (calls or letter). Fax# was given if needs to fax letter to us.    Please call to assist.

## 2024-10-01 LAB — BACTERIA UR CULT: ABNORMAL

## 2024-10-03 ENCOUNTER — OFFICE VISIT (OUTPATIENT)
Dept: UROLOGY | Facility: CLINIC | Age: 59
End: 2024-10-03
Payer: MEDICARE

## 2024-10-03 VITALS
HEART RATE: 68 BPM | WEIGHT: 177 LBS | BODY MASS INDEX: 23.98 KG/M2 | HEIGHT: 72 IN | OXYGEN SATURATION: 97 % | SYSTOLIC BLOOD PRESSURE: 132 MMHG | DIASTOLIC BLOOD PRESSURE: 84 MMHG | TEMPERATURE: 97.8 F

## 2024-10-03 DIAGNOSIS — Z12.5 PROSTATE CANCER SCREENING: Primary | ICD-10-CM

## 2024-10-03 DIAGNOSIS — N28.89 DILATED RENAL PELVIS: ICD-10-CM

## 2024-10-03 LAB — POST-VOID RESIDUAL VOLUME, ML POC: 79 ML

## 2024-10-03 PROCEDURE — 99204 OFFICE O/P NEW MOD 45 MIN: CPT | Performed by: PHYSICIAN ASSISTANT

## 2024-10-03 PROCEDURE — 51798 US URINE CAPACITY MEASURE: CPT | Performed by: PHYSICIAN ASSISTANT

## 2024-10-03 NOTE — PATIENT INSTRUCTIONS
Increase water  Manage constipation  Daily probiotics and cranberry pills  Urinating every 2-3 hours

## 2024-10-03 NOTE — PROGRESS NOTES
10/3/2024      Chief Complaint   Patient presents with    Urinary Tract Infection     PT unable to give Urine sample. PT last Urination today was around 9:45 and PT drank approx. 8 oz of water for the day.         Assessment and Plan    59 y.o. male -- New patient    1. Abnormal CT  2. UTI, recurrent  - UA today unable to be obtained   - PVR today 79 mL  -CT abdomen pelvis with contrast (8/21/2024) showing mild urothelial thickening with the left renal pelvis and proximal ureter showing infectious/inflammatory changes. Mild asymmetric fullness of the left renal pelvis and ureter without obstructing calculus. No renal abscess. Couple of nonobstructing 3 mm left renal calculi. Bladder is distended but unremarkable.  - Asymptomatic at this time  - Continue antibiotics for UTI  - Recommend timed voiding, double voiding, increasing hydration, avoidance of bladder irritants, avoidance of constipation, daily cranberry, daily probiotics   - Consider hiprex for UTI prevention   - US to re-evaluate   - Follow up to review  - Call with any questions or concerns in the meantime  - All questions answered; patient understands and agrees with plan       History of Present Illness  Jairon Oconnell is a 59 y.o. male new patient here for evaluation of abnormal CT.    Patient present with caregiver who provides history.    Patient recently had recurrent urinary tract infections and was recently hospitalized for UTI.  Received IV antibiotics.  Currently taking Keflex oral for UTI.  Denies history of recurrent UTIs or prior need for hospitalizations.  States that he has been constipated.  Denies cranberry or probiotics.  Urinates every 3 or so hours.  Does not drink adequate fluids throughout the day.  Patient does have prediabetes and uses metformin.  Sugars have been controlled per verbal report.  Kidney function stable and at baseline.    Review of Systems   Constitutional:  Negative for activity change, appetite change, chills and  fever.   HENT:  Negative for congestion and trouble swallowing.    Respiratory:  Negative for cough and shortness of breath.    Cardiovascular:  Negative for chest pain, palpitations and leg swelling.   Gastrointestinal:  Negative for abdominal pain, constipation, diarrhea, nausea and vomiting.   Genitourinary:  Negative for difficulty urinating, dysuria, flank pain, frequency, hematuria and urgency.   Musculoskeletal:  Negative for back pain and gait problem.   Skin:  Negative for wound.   Allergic/Immunologic: Negative for immunocompromised state.   Neurological:  Negative for dizziness and syncope.   Hematological:  Does not bruise/bleed easily.   Psychiatric/Behavioral:  Negative for confusion.    All other systems reviewed and are negative.        Vitals  Vitals:    10/03/24 1430   BP: 132/84   Pulse: 68   Temp: 97.8 °F (36.6 °C)   TempSrc: Temporal   SpO2: 97%   Weight: 80.3 kg (177 lb)   Height: 6' (1.829 m)       Physical Exam  Constitutional:       General: He is not in acute distress.     Appearance: Normal appearance. He is not ill-appearing, toxic-appearing or diaphoretic.   HENT:      Head: Normocephalic.      Nose: No congestion.   Eyes:      General: No scleral icterus.        Right eye: No discharge.         Left eye: No discharge.      Conjunctiva/sclera: Conjunctivae normal.      Pupils: Pupils are equal, round, and reactive to light.   Pulmonary:      Effort: Pulmonary effort is normal.   Musculoskeletal:      Cervical back: Normal range of motion.   Skin:     General: Skin is warm and dry.      Coloration: Skin is not jaundiced or pale.      Findings: No bruising, erythema, lesion or rash.   Neurological:      General: No focal deficit present.      Mental Status: He is alert and oriented to person, place, and time. Mental status is at baseline.      Gait: Gait normal.   Psychiatric:         Mood and Affect: Mood normal.         Behavior: Behavior normal.         Thought Content: Thought content  normal.         Judgment: Judgment normal.           Past History  Past Medical History:   Diagnosis Date    ADRIANA (acute kidney injury) ([facility]) 09/24/2019    Bacteremia due to Gram-positive bacteria 09/07/2021    Buttock wound, left, subsequent encounter 11/05/2021    COVID-19     CP (cerebral palsy) ([facility])     Depression     Diabetes ([facility])     Disease of thyroid gland     Gait disorder     Gluteal abscess 09/06/2021    Hypertension     Kidney failure     Kidney stones     Moderate protein-calorie malnutrition ([facility]) 12/22/2021    Osteoporosis     Pressure injury of left buttock, stage 4 ([facility]) 03/09/2022    Psychiatric disorder     Scoliosis     Seizures ([facility])     Sepsis ([facility]) 09/25/2019    Thyroid disease     UTI (urinary tract infection)      Social History     Socioeconomic History    Marital status: Single     Spouse name: None    Number of children: None    Years of education: None    Highest education level: None   Occupational History    None   Tobacco Use    Smoking status: Never     Passive exposure: Never    Smokeless tobacco: Never   Vaping Use    Vaping status: Never Used   Substance and Sexual Activity    Alcohol use: Never    Drug use: No    Sexual activity: Not Currently   Other Topics Concern    None   Social History Narrative    None     Social Determinants of Health     Financial Resource Strain: Not on file   Food Insecurity: No Food Insecurity (8/19/2024)    Hunger Vital Sign     Worried About Running Out of Food in the Last Year: Never true     Ran Out of Food in the Last Year: Never true   Transportation Needs: No Transportation Needs (8/19/2024)    PRAPARE - Transportation     Lack of Transportation (Medical): No     Lack of Transportation (Non-Medical): No   Physical Activity: Not on file   Stress: Not on file   Social Connections: Not on file   Intimate Partner Violence: Not on file   Housing Stability: Low Risk  (8/19/2024)    Housing Stability Vital Sign     Unable to Pay for Housing in the  Last Year: No     Number of Times Moved in the Last Year: 0     Homeless in the Last Year: No     Social History     Tobacco Use   Smoking Status Never    Passive exposure: Never   Smokeless Tobacco Never     History reviewed. No pertinent family history.    The following portions of the patient's history were reviewed and updated as appropriate: allergies, current medications, past medical history, past social history, past surgical history and problem list.    Results  Recent Results (from the past 1 hour(s))   POCT Measure PVR    Collection Time: 10/03/24  2:32 PM   Result Value Ref Range    POST-VOID RESIDUAL VOLUME, ML POC 79 mL   ]  Lab Results   Component Value Date    PSA 0.7 11/09/2022    PSA 1.1 07/07/2022    PSA 1.5 02/25/2020    PSA 0.7 07/31/2019     Lab Results   Component Value Date    GLUCOSE 203 (H) 06/03/2024    CALCIUM 8.7 08/22/2024    K 4.3 08/22/2024    CO2 21 08/22/2024     08/22/2024    BUN 14 08/22/2024    CREATININE 0.58 (L) 08/22/2024     Lab Results   Component Value Date    WBC 7.01 08/22/2024    HGB 9.4 (L) 08/22/2024    HCT 30.8 (L) 08/22/2024    MCV 85 08/22/2024     08/22/2024       Shirlene Jovel PA-C

## 2024-10-04 ENCOUNTER — RA CDI HCC (OUTPATIENT)
Dept: OTHER | Facility: HOSPITAL | Age: 59
End: 2024-10-04

## 2024-10-04 NOTE — TELEPHONE ENCOUNTER
Phone call to patient's caregiver Betty regarding message below. (There is no communication consent in patient's file. Patient gave verbal consent. Betty will complete a communication consent form for patient and ask patient to sign it to at patient's 10-7-24 appointment with PCP.)     Patient has also been receiving both:  Divalproex sodium (DEPAKOTE ER) 250 mg 24 hr tablet, and Depakote 500 mg DR tablets monthly in the mail. Caregiver has been sending the generic Divalproex back each month. Now patient's insurance company is asking why patient requires BRAND ONLY. Per office notes by Ailyn, on 7-10-20, patient is prescribed Depakote BRAND ONLY.     There is not a prior auth request scanned into media to date.     Betty stated patient is doing well with no seizure activity.    LOV Dr Banerjee 8-26-24  Scheduled NOV with Dr Banerjee 2-20-24 Virtual 1:30 pm     Dr Banerjee, please discontinue Divalproex sodium prescription from patient's chart. Thank you!

## 2024-10-06 PROBLEM — E11.9 DIABETIC EYE EXAM (HCC): Status: ACTIVE | Noted: 2024-10-06

## 2024-10-06 PROBLEM — Z00.00 MEDICARE ANNUAL WELLNESS VISIT, SUBSEQUENT: Status: ACTIVE | Noted: 2024-10-06

## 2024-10-06 PROBLEM — Z12.5 SCREENING FOR PROSTATE CANCER: Chronic | Status: RESOLVED | Noted: 2024-09-06 | Resolved: 2024-10-06

## 2024-10-06 PROBLEM — E11.9 DIABETIC EYE EXAM (HCC): Chronic | Status: ACTIVE | Noted: 2024-10-06

## 2024-10-06 PROBLEM — Z01.00 DIABETIC EYE EXAM (HCC): Chronic | Status: ACTIVE | Noted: 2024-10-06

## 2024-10-06 PROBLEM — Z13.1 SCREENING FOR DIABETES MELLITUS: Chronic | Status: ACTIVE | Noted: 2024-09-06

## 2024-10-06 PROBLEM — Z23 ENCOUNTER FOR IMMUNIZATION: Status: ACTIVE | Noted: 2024-10-06

## 2024-10-06 PROBLEM — Z13.1 SCREENING FOR DIABETES MELLITUS: Chronic | Status: RESOLVED | Noted: 2024-09-06 | Resolved: 2024-10-06

## 2024-10-06 PROBLEM — Z13.1 SCREENING FOR DIABETES MELLITUS: Status: RESOLVED | Noted: 2024-09-06 | Resolved: 2024-10-06

## 2024-10-06 PROBLEM — E11.9 ENCOUNTER FOR DIABETIC FOOT EXAM (HCC): Status: ACTIVE | Noted: 2024-10-06

## 2024-10-06 PROBLEM — E11.9 ENCOUNTER FOR DIABETIC FOOT EXAM (HCC): Chronic | Status: ACTIVE | Noted: 2024-10-06

## 2024-10-06 PROBLEM — Z01.00 DIABETIC EYE EXAM (HCC): Status: ACTIVE | Noted: 2024-10-06

## 2024-10-06 PROBLEM — Z00.00 MEDICARE ANNUAL WELLNESS VISIT, SUBSEQUENT: Chronic | Status: ACTIVE | Noted: 2024-10-06

## 2024-10-06 PROBLEM — N30.01 ACUTE CYSTITIS WITH HEMATURIA: Chronic | Status: ACTIVE | Noted: 2024-08-18

## 2024-10-06 PROBLEM — Z12.5 SCREENING FOR PROSTATE CANCER: Status: RESOLVED | Noted: 2024-09-06 | Resolved: 2024-10-06

## 2024-10-06 PROBLEM — N30.01 ACUTE CYSTITIS WITH HEMATURIA: Status: ACTIVE | Noted: 2024-08-18

## 2024-10-06 PROBLEM — N39.0 UTI (URINARY TRACT INFECTION): Chronic | Status: ACTIVE | Noted: 2024-08-18

## 2024-10-06 PROBLEM — Z12.5 SCREENING FOR PROSTATE CANCER: Chronic | Status: ACTIVE | Noted: 2024-09-06

## 2024-10-07 DIAGNOSIS — E06.3 HYPOTHYROIDISM DUE TO HASHIMOTO'S THYROIDITIS: ICD-10-CM

## 2024-10-08 RX ORDER — LEVOTHYROXINE SODIUM 150 UG/1
TABLET ORAL
Qty: 28 TABLET | Refills: 5 | Status: SHIPPED | OUTPATIENT
Start: 2024-10-08

## 2024-10-11 ENCOUNTER — TELEPHONE (OUTPATIENT)
Dept: NEUROLOGY | Facility: CLINIC | Age: 59
End: 2024-10-11

## 2024-10-11 NOTE — TELEPHONE ENCOUNTER
MISTI as patient appointment with  needs to be rescheduled. Patient was scheduled twice on 2/20/2025. There was an in office appt @ 1 pm and a virtual @130 pm. I have tentatively R/S appt to 2/26/2025 @ 2pm  as an in office appointment .  If call is return please verify if patient needs to switch to virtual and if day/ time is appropriate.

## 2024-10-14 ENCOUNTER — TELEPHONE (OUTPATIENT)
Age: 59
End: 2024-10-14

## 2024-10-14 NOTE — TELEPHONE ENCOUNTER
Patient's caregiver Betty called today to check on the status of a Medical Visit Report form that she faxed over shortly after the patient's last OV on 10/3.    I wasn't able to locate the form in the patient's chart and provided the caller with the office's fax number [phone] to please resend.    When received please have provider complete and fax back to [phone]

## 2024-10-17 ENCOUNTER — TELEPHONE (OUTPATIENT)
Age: 59
End: 2024-10-17

## 2024-10-17 NOTE — TELEPHONE ENCOUNTER
Patient keeps getting frequent UTIs. Has been taking cranberry pills as suggested. Just finished a round of antibiotics recently. But they aren't sure what to do anymore. Please advise.    Kemi- [phone]

## 2024-10-18 ENCOUNTER — OFFICE VISIT (OUTPATIENT)
Dept: FAMILY MEDICINE CLINIC | Facility: CLINIC | Age: 59
End: 2024-10-18
Payer: MEDICARE

## 2024-10-18 VITALS
DIASTOLIC BLOOD PRESSURE: 78 MMHG | OXYGEN SATURATION: 96 % | TEMPERATURE: 97.3 F | WEIGHT: 181 LBS | BODY MASS INDEX: 24.52 KG/M2 | HEART RATE: 88 BPM | SYSTOLIC BLOOD PRESSURE: 150 MMHG | HEIGHT: 72 IN

## 2024-10-18 DIAGNOSIS — Z13.29 SCREENING FOR THYROID DISORDER: Chronic | ICD-10-CM

## 2024-10-18 DIAGNOSIS — Z12.11 SCREENING FOR COLORECTAL CANCER: ICD-10-CM

## 2024-10-18 DIAGNOSIS — E03.9 ACQUIRED HYPOTHYROIDISM: Chronic | ICD-10-CM

## 2024-10-18 DIAGNOSIS — Z12.12 SCREENING FOR COLORECTAL CANCER: ICD-10-CM

## 2024-10-18 DIAGNOSIS — R26.2 AMBULATORY DYSFUNCTION: Chronic | ICD-10-CM

## 2024-10-18 DIAGNOSIS — R53.1 WEAKNESS: Chronic | ICD-10-CM

## 2024-10-18 DIAGNOSIS — N30.00 ACUTE CYSTITIS WITHOUT HEMATURIA: Chronic | ICD-10-CM

## 2024-10-18 DIAGNOSIS — E55.9 VITAMIN D DEFICIENCY: Chronic | ICD-10-CM

## 2024-10-18 DIAGNOSIS — I10 PRIMARY HYPERTENSION: Chronic | ICD-10-CM

## 2024-10-18 DIAGNOSIS — Z12.5 SCREENING FOR PROSTATE CANCER: Chronic | ICD-10-CM

## 2024-10-18 DIAGNOSIS — E53.8 VITAMIN B12 DEFICIENCY: Chronic | ICD-10-CM

## 2024-10-18 DIAGNOSIS — E11.40 TYPE 2 DIABETES MELLITUS WITH DIABETIC NEUROPATHY, WITHOUT LONG-TERM CURRENT USE OF INSULIN (HCC): Primary | Chronic | ICD-10-CM

## 2024-10-18 DIAGNOSIS — G80.4 ATAXIC CEREBRAL PALSY (HCC): Chronic | ICD-10-CM

## 2024-10-18 DIAGNOSIS — E78.2 MIXED HYPERLIPIDEMIA: Chronic | ICD-10-CM

## 2024-10-18 DIAGNOSIS — E11.9 ENCOUNTER FOR DIABETIC FOOT EXAM (HCC): Chronic | ICD-10-CM

## 2024-10-18 DIAGNOSIS — E83.42 HYPOMAGNESEMIA: Chronic | ICD-10-CM

## 2024-10-18 DIAGNOSIS — K59.01 SLOW TRANSIT CONSTIPATION: Chronic | ICD-10-CM

## 2024-10-18 DIAGNOSIS — D50.8 IRON DEFICIENCY ANEMIA SECONDARY TO INADEQUATE DIETARY IRON INTAKE: Chronic | ICD-10-CM

## 2024-10-18 DIAGNOSIS — Z13.1 SCREENING FOR DIABETES MELLITUS: Chronic | ICD-10-CM

## 2024-10-18 DIAGNOSIS — E53.8 FOLIC ACID DEFICIENCY: Chronic | ICD-10-CM

## 2024-10-18 DIAGNOSIS — Z23 ENCOUNTER FOR IMMUNIZATION: ICD-10-CM

## 2024-10-18 DIAGNOSIS — G40.309 GENERALIZED CONVULSIVE EPILEPSY (HCC): Chronic | ICD-10-CM

## 2024-10-18 PROCEDURE — G2211 COMPLEX E/M VISIT ADD ON: HCPCS | Performed by: NURSE PRACTITIONER

## 2024-10-18 PROCEDURE — 99213 OFFICE O/P EST LOW 20 MIN: CPT | Performed by: NURSE PRACTITIONER

## 2024-10-18 RX ORDER — CIPROFLOXACIN 500 MG/1
500 TABLET, FILM COATED ORAL EVERY 12 HOURS SCHEDULED
Qty: 40 TABLET | Refills: 0 | Status: SHIPPED | OUTPATIENT
Start: 2024-10-18 | End: 2024-11-07

## 2024-10-18 RX ORDER — FLUCONAZOLE 200 MG/1
200 TABLET ORAL DAILY
Qty: 5 TABLET | Refills: 0 | Status: SHIPPED | OUTPATIENT
Start: 2024-10-18 | End: 2024-10-23

## 2024-10-18 RX ORDER — DIVALPROEX SODIUM 250 MG
250 TABLET, EXTENDED RELEASE 24 HR ORAL DAILY
COMMUNITY
Start: 2024-10-17

## 2024-10-18 NOTE — ASSESSMENT & PLAN NOTE
Orders:    ciprofloxacin (CIPRO) 500 mg tablet; Take 1 tablet (500 mg total) by mouth every 12 (twelve) hours for 20 days    fluconazole (DIFLUCAN) 200 mg tablet; Take 1 tablet (200 mg total) by mouth daily for 5 days

## 2024-10-18 NOTE — ASSESSMENT & PLAN NOTE
This is a chronic disease process  Discussed diet and exercise  Continue current medications  Lab Results   Component Value Date    HGBA1C 6.4 (H) 08/20/2024             Orders:    Diabetic foot exam; Future

## 2024-10-18 NOTE — PROGRESS NOTES
Ambulatory Visit  Name: Jairon Oconnell      : 1965      MRN: [id number]  Encounter Provider: HUNTER Rose  Encounter Date: 10/18/2024   Encounter department: [facility]    Assessment & Plan  Encounter for immunization         Screening for colorectal cancer         Type 2 diabetes mellitus with diabetic neuropathy, without long-term current use of insulin (HCC)  This is a chronic disease process  Discussed diet and exercise  Continue current medications  Lab Results   Component Value Date    HGBA1C 6.4 (H) 2024             Orders:    Diabetic foot exam; Future    Primary hypertension  Continue  Lisinopril         Acquired hypothyroidism  Continue current medications  This is a chronic disease process       Generalized convulsive epilepsy (HCC)         Ataxic cerebral palsy (HCC)         Iron deficiency anemia secondary to inadequate dietary iron intake         Ambulatory dysfunction         Weakness         Vitamin D deficiency         Vitamin B12 deficiency         Screening for thyroid disorder         Screening for diabetes mellitus         Screening for prostate cancer         Mixed hyperlipidemia         Hypomagnesemia         Folic acid deficiency         Encounter for diabetic foot exam (HCC)         Slow transit constipation         Acute cystitis without hematuria    Orders:    ciprofloxacin (CIPRO) 500 mg tablet; Take 1 tablet (500 mg total) by mouth every 12 (twelve) hours for 20 days    fluconazole (DIFLUCAN) 200 mg tablet; Take 1 tablet (200 mg total) by mouth daily for 5 days       History of Present Illness     The patient is here today to discuss his medications and treatment plans.  All his disease processes are chronic and stable we will also continue all his current medications  I reviewed their medical conditions, medications, laboratory and radiology studies.  I reviewed their scheduled future lab studies with the patient.   The patient's  current treatment plan is effective.   There is no change in the current therapy.   I ask the patient to continue their current therapy.   The patient voiced their understanding and agreement.   Follow up after 5/8/2025 .  Please continue to the PORCH section of the note for details of today's visit.               History obtained from : patient  Review of Systems   Constitutional:  Negative for activity change, chills, fatigue and fever.   HENT:  Negative for rhinorrhea and sore throat.    Eyes:  Negative for pain.   Respiratory:  Negative for cough and shortness of breath.    Cardiovascular:  Negative for chest pain, palpitations and leg swelling.   Gastrointestinal:  Negative for abdominal pain, constipation, diarrhea, nausea and vomiting.   Genitourinary:  Positive for frequency and urgency. Negative for difficulty urinating, dysuria and flank pain.   Musculoskeletal:  Negative for gait problem, joint swelling and myalgias.   Skin:  Negative for color change.   Neurological:  Negative for dizziness, weakness, light-headedness and headaches.   Psychiatric/Behavioral:  Negative for sleep disturbance. The patient is not nervous/anxious.    All other systems reviewed and are negative.    Current Outpatient Medications on File Prior to Visit   Medication Sig Dispense Refill    acetaminophen (TYLENOL) 325 mg tablet       ascorbic acid (VITAMIN C) 1000 MG TBCR Take 1 tablet (1,000 mg total) by mouth daily 90 tablet 1    Blood Glucose Monitoring Suppl (OneTouch Verio Reflect) w/Device KIT Check blood sugars twice daily. Please substitute with appropriate alternative as covered by patient's insurance. Dx: E11.65 1 kit 0    Depakote 500 MG DR tablet Take 1 tab in the morning and 2 tabs at night. Brand necessary 90 tablet 11    Depakote  MG 24 hr tablet Take 250 mg by mouth daily      Disposable Gloves (Safe-Sense Glove-Blk-Nitrl-L) MISC Use 1 each 3 (three) times a day as needed (care taker assisting with soiled  briefs) 100 each 1    docusate sodium (COLACE) 100 mg capsule Take 1 capsule (100 mg total) by mouth 2 (two) times a day 180 capsule 1    folic acid (FOLVITE) 1 mg tablet GIVE 1 TABLET BY MOUTH DAILY DX METABOLIC ENCEPHALOPATHY 25 tablet 5    gabapentin (NEURONTIN) 300 mg capsule Take 300 mg by mouth 3 (three) times a day      glucose blood (OneTouch Verio) test strip Check blood sugars twice daily. Please substitute with appropriate alternative as covered by patient's insurance. Dx: E11.65 200 each 3    hydrOXYzine pamoate (VISTARIL) 25 mg capsule Take 25 mg by mouth every 12 (twelve) hours      Hyprom-Naphaz-Polysorb-Zn Sulf (CLEAR EYES COMPLETE OP) Apply to eye      Incontinence Supply Disposable (Briefs Overnight Medium) MISC Use in the morning 20 each 11    lacosamide (VIMPAT) 150 mg tablet Take 1 tab (150 mg) in the morning. 30 tablet 5    lacosamide (VIMPAT) 200 mg tablet Take 1 tab (200 mg) at night. 30 tablet 5    levETIRAcetam (KEPPRA) 750 mg tablet Give 2 tabs (1500 mg) by mouth twice a day 120 tablet 11    levothyroxine 150 mcg tablet GIVE 1 TABLET BY MOUTH IN THE MORNING DX HYPOTHYROIDISM (DR HENDRICKSON) 28 tablet 5    lisinopril (ZESTRIL) 5 mg tablet Take 1 tablet (5 mg total) by mouth daily 30 tablet 0    loratadine (CLARITIN) 10 mg tablet Take 1 tablet (10 mg total) by mouth daily 90 tablet 3    metFORMIN (GLUCOPHAGE) 1000 MG tablet GIVE 1 TABLET BY MOUTH TWICE DAILY WITH MEALS FOR DIABETES 60 tablet 0    Multiple Vitamin (Daily-Reina) TABS Take 1 tablet by mouth daily Take at 8 AM 90 tablet 11    Nutritional Supplements (Ensure Max Protein) LIQD Take 1 Bottle by mouth in the morning 990 mL 5    OneTouch Delica Lancets 33G MISC Check blood sugars twice daily. Please substitute with appropriate alternative as covered by patient's insurance. Dx: E11.65 200 each 3    QUEtiapine (SEROquel XR) 400 mg 24 hr tablet Take 1 tablet (400 mg total) by mouth daily at bedtime      QUEtiapine (SEROquel) 200 mg tablet  Take 1 tablet (200 mg total) by mouth 2 (two) times a day before breakfast and lunch      temazepam (RESTORIL) 7.5 mg capsule       zonisamide (ZONEGRAN) 100 mg capsule Take 3 capsules (300 mg total) by mouth daily at bedtime 90 capsule 11    cyanocobalamin (VITAMIN B-12) 500 MCG tablet Take 1 tablet (500 mcg total) by mouth daily 30 tablet 0    glucose blood (OneTouch Verio) test strip Check blood sugars once daily. Please substitute with appropriate alternative as covered by patient's insurance. Dx: E11.65 100 each 3    magnesium Oxide (MAG-OX) 400 mg TABS Take 1 tablet (400 mg total) by mouth 2 (two) times a day 60 tablet 0    polyethylene glycol (MIRALAX) 17 g packet Take 17 g by mouth daily for 14 days 238 g 0    senna-docusate sodium (SENOKOT S) 8.6-50 mg per tablet Take 2 tablets by mouth 2 (two) times a day 120 tablet 0    simvastatin (ZOCOR) 40 mg tablet GIVE 1 TABLET BY MOUTH AT BEDTIME FOR CHOLESTEROL 30 tablet 0    temazepam (RESTORIL) 15 mg capsule Take 1 capsule (15 mg total) by mouth daily at bedtime as needed for sleep for up to 10 days (Patient taking differently: Take 7.5 mg by mouth daily at bedtime Taking 3 tabs of 7.5 mg for a total dose of(22.5 mg)) 10 capsule 0     No current facility-administered medications on file prior to visit.          Objective     /78   Pulse 88   Temp (!) 97.3 °F (36.3 °C) (Tympanic)   Ht 6' (1.829 m)   Wt 82.1 kg (181 lb)   SpO2 96%   BMI 24.55 kg/m²     Physical Exam  Vitals and nursing note reviewed.   Constitutional:       General: He is awake.      Appearance: Normal appearance. He is well-developed.   HENT:      Head: Normocephalic and atraumatic.      Nose: Nose normal.      Mouth/Throat:      Mouth: Mucous membranes are moist.   Eyes:      Conjunctiva/sclera: Conjunctivae normal.   Cardiovascular:      Rate and Rhythm: Normal rate and regular rhythm.      Pulses: Normal pulses.      Heart sounds: Normal heart sounds. No murmur heard.  Pulmonary:       Effort: Pulmonary effort is normal. No respiratory distress.      Breath sounds: Normal breath sounds.   Abdominal:      General: Bowel sounds are normal.      Palpations: Abdomen is soft.      Tenderness: There is no abdominal tenderness.   Genitourinary:     Comments: Frequency, urgency, denies dysuria   Musculoskeletal:      Cervical back: Neck supple.      Right lower leg: No edema.      Left lower leg: No edema.   Skin:     General: Skin is warm and dry.   Neurological:      Mental Status: He is alert and oriented to person, place, and time.      Motor: Motor function is intact.      Coordination: Coordination is intact.      Gait: Gait is intact.   Psychiatric:         Attention and Perception: Attention normal.         Mood and Affect: Mood normal.         Speech: Speech normal.         Behavior: Behavior normal. Behavior is cooperative.         Thought Content: Thought content normal.         Cognition and Memory: Cognition normal.         Judgment: Judgment normal.       Administrative Statements   I have spent a total time of 25 minutes in caring for this patient on the day of the visit/encounter including Diagnostic results, Prognosis, Risks and benefits of tx options, Instructions for management, Patient and family education, Importance of tx compliance, Risk factor reductions, Impressions, Counseling / Coordination of care, Documenting in the medical record, Reviewing / ordering tests, medicine, procedures  , and Obtaining or reviewing history  .

## 2024-11-04 DIAGNOSIS — D50.8 IRON DEFICIENCY ANEMIA SECONDARY TO INADEQUATE DIETARY IRON INTAKE: Chronic | ICD-10-CM

## 2024-11-05 RX ORDER — LANOLIN ALCOHOL/MO/W.PET/CERES
CREAM (GRAM) TOPICAL
Qty: 56 CAPSULE | Refills: 1 | Status: SHIPPED | OUTPATIENT
Start: 2024-11-05

## 2024-11-07 ENCOUNTER — TELEPHONE (OUTPATIENT)
Dept: UROLOGY | Facility: CLINIC | Age: 59
End: 2024-11-07

## 2024-11-07 NOTE — TELEPHONE ENCOUNTER
Called and left VM for the PT. Appt with Shirlene ADAM is to go over US however PT appt for the US is 11/19/24. Office number left for the PT to call the office back to reschedule.    If PT calls back per Shirlene ADAM can reschedule PT in a urgent spot about a week after US appt.

## 2024-11-14 ENCOUNTER — TELEPHONE (OUTPATIENT)
Age: 59
End: 2024-11-14

## 2024-11-14 NOTE — TELEPHONE ENCOUNTER
Caregiver, Betty, requests update to Jairon's med list.    She states [facility] continues to have Ensure liquid drinks on Jairon's list, states he has never received nor taken Ensure, states he does not need Ensure.    Betty asks that PCP office contact [facility] and ask them to remove Ensure.    She also asks that PCP provide documentation to Jairon's program stating that Ensure is not necessary.    She asks that letter from PCP is emailed to: [email or web address]    Betty can be reached at [phone].

## 2024-11-14 NOTE — TELEPHONE ENCOUNTER
Received call from Betty, caregiver of pt.  She states that pt's neurologist had discontinued his Depakote 250mg dose.  Pt is supposed to be on Depakote 500mg 1 tab in AM and 2 tabs at night.  She is requesting that the Depakote 250mg order be discontinued. She also is requesting that that change in med be indicated in the letter to the  that is being sent.  Please review and advise.

## 2024-11-14 NOTE — TELEPHONE ENCOUNTER
Recd call from Betty Huffman (pt's caregiver) [phone]. They are having a state inspection next week. Depakote ER 250mg is still listed on pt's medication list. This should have been d/c as pt no longer takes this med. He now takes Depakote 500.    Per 9/30/24 telephone encounter, Depakote ER 250mg was discontinued by Dr. Banerjee on 10/4/24.     Per chart review, PCP entered rx for Depakote ER 250mg on 10/17/24. Advised Betty she will have to contact PCP to have med d/c.     Medical Communication consent form was not signed at PCP appt as previously requested. Will email form to pt's caregiver. She will help pt complete/sign form. Please email form to:    [email or web address]    Clerical - please email Medical Communication Consent form to email address above. Thank you.

## 2024-11-15 ENCOUNTER — TELEPHONE (OUTPATIENT)
Dept: FAMILY MEDICINE CLINIC | Facility: CLINIC | Age: 59
End: 2024-11-15

## 2024-11-15 NOTE — TELEPHONE ENCOUNTER
I contacted [facility] - [address]  [address], [place] [address]  Phone: [phone]  Fax: [phone].     I provided patient's name and . They will remove Ensure from patient's list.     SYDNIE Hernandez

## 2024-11-17 PROBLEM — Z12.12 SCREENING FOR COLORECTAL CANCER: Status: RESOLVED | Noted: 2024-10-18 | Resolved: 2024-11-17

## 2024-11-17 PROBLEM — Z12.11 SCREENING FOR COLORECTAL CANCER: Status: RESOLVED | Noted: 2024-10-18 | Resolved: 2024-11-17

## 2024-11-21 ENCOUNTER — TELEPHONE (OUTPATIENT)
Age: 59
End: 2024-11-21

## 2024-11-21 NOTE — TELEPHONE ENCOUNTER
Pt caregiver called in to let PCP know that he started yesterday with signs of a UTI. He has been urinating on himself and it has a malodor. Caregiver did a dip of urine and states it was positive. If antibiotics can be ordered please call into [facility].

## 2024-11-22 DIAGNOSIS — N30.00 ACUTE CYSTITIS WITHOUT HEMATURIA: Primary | Chronic | ICD-10-CM

## 2024-11-22 RX ORDER — CIPROFLOXACIN 500 MG/1
500 TABLET, FILM COATED ORAL EVERY 12 HOURS SCHEDULED
Qty: 20 TABLET | Refills: 0 | Status: SHIPPED | OUTPATIENT
Start: 2024-11-22 | End: 2024-12-02

## 2024-11-22 RX ORDER — FLUCONAZOLE 100 MG/1
100 TABLET ORAL DAILY
Qty: 10 TABLET | Refills: 0 | Status: SHIPPED | OUTPATIENT
Start: 2024-11-22 | End: 2024-12-02

## 2024-11-22 NOTE — TELEPHONE ENCOUNTER
Caregiver, Betty, called back again to follow up on her message from yesterday, 11/21/2024.     She states Jairon is experiencing urinary symptoms, has frequent UTIs, had positive home test for UTI. She states he was recently hospitalized for UTI. She is concerned that this can progress quickly again.    She requests that a prescription is sent to the pharmacy today for antibiotics.

## 2024-11-25 ENCOUNTER — OFFICE VISIT (OUTPATIENT)
Dept: FAMILY MEDICINE CLINIC | Facility: CLINIC | Age: 59
End: 2024-11-25
Payer: MEDICARE

## 2024-11-25 VITALS
WEIGHT: 178 LBS | TEMPERATURE: 96.8 F | DIASTOLIC BLOOD PRESSURE: 78 MMHG | SYSTOLIC BLOOD PRESSURE: 144 MMHG | HEIGHT: 72 IN | OXYGEN SATURATION: 97 % | BODY MASS INDEX: 24.11 KG/M2 | HEART RATE: 91 BPM

## 2024-11-25 DIAGNOSIS — T14.8XXA BLOOD BLISTER: Primary | ICD-10-CM

## 2024-11-25 PROCEDURE — 99213 OFFICE O/P EST LOW 20 MIN: CPT

## 2024-11-25 NOTE — PROGRESS NOTES
Name: Jairon Oconnell      : 1965      MRN: [id number]  Encounter Provider: Negin Will PA-C  Encounter Date: 2024   Encounter department: [facility]  :  Assessment & Plan  Blood blister  -Blister was large and causing patient a lot of pain when walking  -Patient and caregiver agreeable to try draining   -25 gauge needle was used to puncture blister and all the blood was drained out.  -Patient walking better out of the office  -Educated caregiver to cover blister for the rest of the day   -Educated to watch for signs of infection such as fever, redness, swelling, heat.         Incision and Drainage    Date/Time: 2024 2:00 PM    Performed by: Negin Will PA-C  Authorized by: Negin Will PA-C  Universal Protocol:  procedure performed by consultantConsent: Verbal consent obtained. Written consent not obtained.  Risks and benefits: risks, benefits and alternatives were discussed  Consent given by: caregiver.  Patient understanding: patient states understanding of the procedure being performed  Patient identity confirmed: verbally with patient    Patient location:  Clinic  Location:     Indications for incision and drainage: blood blister.    Size:  1.5cm diameter    Location: left big toe.  Pre-procedure details:     Skin preparation:  Alcohol swab  Anesthesia (see MAR for exact dosages):     Anesthesia method:  None  Procedure details:     Complexity:  Simple    Approach:  Puncture    Drainage:  Bloody    Drainage amount:  Moderate    Wound treatment:  Wound left open    Packing materials:  None  Post-procedure details:     Patient tolerance of procedure:  Tolerated well, no immediate complications         History of Present Illness     -Patient is a 59-year-old male who presents today with a black blister on his right toe. He is accompanied by his caregiver. Caregiver states that they noticed this blister on his toe yesterday. It has not grown in size since  then. He states that the patient has been walking weird and can tell that his toe is bothering him when he walks.     He also states that the patient bumped his left pinky toe yesterday as well and would like that looked at.       Review of Systems   Constitutional:  Negative for chills, fatigue and fever.   HENT:  Negative for sore throat.    Eyes:  Negative for pain.   Respiratory:  Negative for cough, chest tightness and shortness of breath.    Cardiovascular:  Negative for chest pain and palpitations.   Gastrointestinal:  Negative for abdominal pain, constipation, diarrhea, nausea and vomiting.   Genitourinary:  Negative for difficulty urinating and dysuria.   Musculoskeletal:  Negative for arthralgias and back pain.   Skin:  Positive for wound. Negative for color change and rash.        Black blister on the bottom of right big toe    Neurological:  Negative for syncope and headaches.   All other systems reviewed and are negative.    Current Outpatient Medications on File Prior to Visit   Medication Sig Dispense Refill    acetaminophen (TYLENOL) 325 mg tablet       Ascorbic Acid (Vitamin C ER) 500 MG CPCR TAKE 2 TABLETS BY MOUTH DAILY DX IRON DEFICIENCY ANEMIAS (=1000MG) 56 capsule 1    Blood Glucose Monitoring Suppl (OneTouch Verio Reflect) w/Device KIT Check blood sugars twice daily. Please substitute with appropriate alternative as covered by patient's insurance. Dx: E11.65 1 kit 0    ciprofloxacin (CIPRO) 500 mg tablet Take 1 tablet (500 mg total) by mouth every 12 (twelve) hours for 10 days 20 tablet 0    cyanocobalamin (VITAMIN B-12) 500 MCG tablet Take 1 tablet (500 mcg total) by mouth daily 30 tablet 0    Depakote 500 MG DR tablet Take 1 tab in the morning and 2 tabs at night. Brand necessary 90 tablet 11    Disposable Gloves (Safe-Sense Glove-Blk-Nitrl-L) MISC Use 1 each 3 (three) times a day as needed (care taker assisting with soiled briefs) 100 each 1    docusate sodium (COLACE) 100 mg capsule Take 1  capsule (100 mg total) by mouth 2 (two) times a day 180 capsule 1    fluconazole (DIFLUCAN) 100 mg tablet Take 1 tablet (100 mg total) by mouth daily for 10 days 10 tablet 0    folic acid (FOLVITE) 1 mg tablet GIVE 1 TABLET BY MOUTH DAILY DX METABOLIC ENCEPHALOPATHY 25 tablet 5    gabapentin (NEURONTIN) 300 mg capsule Take 300 mg by mouth 3 (three) times a day      glucose blood (OneTouch Verio) test strip Check blood sugars once daily. Please substitute with appropriate alternative as covered by patient's insurance. Dx: E11.65 100 each 3    glucose blood (OneTouch Verio) test strip Check blood sugars twice daily. Please substitute with appropriate alternative as covered by patient's insurance. Dx: E11.65 200 each 3    hydrOXYzine pamoate (VISTARIL) 25 mg capsule Take 25 mg by mouth every 12 (twelve) hours      Hyprom-Naphaz-Polysorb-Zn Sulf (CLEAR EYES COMPLETE OP) Apply to eye      Incontinence Supply Disposable (Briefs Overnight Medium) MISC Use in the morning 20 each 11    lacosamide (VIMPAT) 150 mg tablet Take 1 tab (150 mg) in the morning. 30 tablet 5    lacosamide (VIMPAT) 200 mg tablet Take 1 tab (200 mg) at night. 30 tablet 5    levETIRAcetam (KEPPRA) 750 mg tablet Give 2 tabs (1500 mg) by mouth twice a day 120 tablet 11    levothyroxine 150 mcg tablet GIVE 1 TABLET BY MOUTH IN THE MORNING DX HYPOTHYROIDISM (DR HENDRICKSON) 28 tablet 5    lisinopril (ZESTRIL) 5 mg tablet Take 1 tablet (5 mg total) by mouth daily 30 tablet 0    loratadine (CLARITIN) 10 mg tablet Take 1 tablet (10 mg total) by mouth daily 90 tablet 3    magnesium Oxide (MAG-OX) 400 mg TABS Take 1 tablet (400 mg total) by mouth 2 (two) times a day 60 tablet 0    metFORMIN (GLUCOPHAGE) 1000 MG tablet GIVE 1 TABLET BY MOUTH TWICE DAILY WITH MEALS FOR DIABETES 60 tablet 0    Multiple Vitamin (Daily-Reina) TABS Take 1 tablet by mouth daily Take at 8 AM 90 tablet 11    Nutritional Supplements (Ensure Max Protein) LIQD Take 1 Bottle by mouth in the  morning 990 mL 5    OneTouch Delica Lancets 33G MISC Check blood sugars twice daily. Please substitute with appropriate alternative as covered by patient's insurance. Dx: E11.65 200 each 3    polyethylene glycol (MIRALAX) 17 g packet Take 17 g by mouth daily for 14 days 238 g 0    QUEtiapine (SEROquel XR) 400 mg 24 hr tablet Take 1 tablet (400 mg total) by mouth daily at bedtime      QUEtiapine (SEROquel) 200 mg tablet Take 1 tablet (200 mg total) by mouth 2 (two) times a day before breakfast and lunch      senna-docusate sodium (SENOKOT S) 8.6-50 mg per tablet Take 2 tablets by mouth 2 (two) times a day 120 tablet 0    simvastatin (ZOCOR) 40 mg tablet GIVE 1 TABLET BY MOUTH AT BEDTIME FOR CHOLESTEROL 30 tablet 0    temazepam (RESTORIL) 15 mg capsule Take 1 capsule (15 mg total) by mouth daily at bedtime as needed for sleep for up to 10 days (Patient taking differently: Take 7.5 mg by mouth daily at bedtime Taking 3 tabs of 7.5 mg for a total dose of(22.5 mg)) 10 capsule 0    temazepam (RESTORIL) 7.5 mg capsule       zonisamide (ZONEGRAN) 100 mg capsule Take 3 capsules (300 mg total) by mouth daily at bedtime 90 capsule 11     No current facility-administered medications on file prior to visit.         Objective   /78 (Patient Position: Sitting, Cuff Size: Large)   Pulse 91   Temp (!) 96.8 °F (36 °C) (Tympanic)   Ht 6' (1.829 m)   Wt 80.7 kg (178 lb)   SpO2 97%   BMI 24.14 kg/m²      Physical Exam  Vitals and nursing note reviewed.   Constitutional:       General: He is not in acute distress.     Appearance: He is well-developed.   HENT:      Head: Normocephalic and atraumatic.   Eyes:      Conjunctiva/sclera: Conjunctivae normal.   Cardiovascular:      Rate and Rhythm: Normal rate and regular rhythm.      Heart sounds: No murmur heard.  Pulmonary:      Effort: Pulmonary effort is normal. No respiratory distress.      Breath sounds: Normal breath sounds.   Abdominal:      Palpations: Abdomen is soft.       Tenderness: There is no abdominal tenderness.   Musculoskeletal:         General: No swelling.      Cervical back: Neck supple.        Feet:    Skin:     General: Skin is warm and dry.      Capillary Refill: Capillary refill takes less than 2 seconds.      Findings: Lesion present.   Neurological:      Mental Status: He is alert and oriented to person, place, and time.   Psychiatric:         Mood and Affect: Mood normal.       Administrative Statements   I have spent a total time of 25 minutes in caring for this patient on the day of the visit/encounter including Diagnostic results, Prognosis, Risks and benefits of tx options, Instructions for management, Patient and family education, Importance of tx compliance, Risk factor reductions, Impressions, Counseling / Coordination of care, Documenting in the medical record, Reviewing / ordering tests, medicine, procedures  , and Obtaining or reviewing history  . Topics discussed with the patient / family include symptom assessment and management and medication review.

## 2024-11-27 DIAGNOSIS — N30.00 ACUTE CYSTITIS WITHOUT HEMATURIA: Chronic | ICD-10-CM

## 2024-11-27 RX ORDER — FLUCONAZOLE 100 MG/1
100 TABLET ORAL DAILY
Qty: 5 TABLET | Refills: 0 | Status: SHIPPED | OUTPATIENT
Start: 2024-11-27 | End: 2024-12-02

## 2024-11-27 NOTE — TELEPHONE ENCOUNTER
Betty called stating that her  accidentally threw out patients remainder pills of Diflucan. They need an additional 5 days of the medication. She is asking if PCP can please send 5 day supply to the [facility]. Please call Betty if able to sent prescription.

## 2024-12-30 DIAGNOSIS — D50.8 IRON DEFICIENCY ANEMIA SECONDARY TO INADEQUATE DIETARY IRON INTAKE: Chronic | ICD-10-CM

## 2024-12-30 RX ORDER — LANOLIN ALCOHOL/MO/W.PET/CERES
CREAM (GRAM) TOPICAL
Qty: 56 CAPSULE | Refills: 1 | Status: SHIPPED | OUTPATIENT
Start: 2024-12-30

## 2025-01-02 ENCOUNTER — HOSPITAL ENCOUNTER (OUTPATIENT)
Dept: ULTRASOUND IMAGING | Facility: HOSPITAL | Age: 60
End: 2025-01-02
Payer: MEDICARE

## 2025-01-02 DIAGNOSIS — N28.89 DILATED RENAL PELVIS: ICD-10-CM

## 2025-01-02 PROCEDURE — 76775 US EXAM ABDO BACK WALL LIM: CPT

## 2025-01-08 ENCOUNTER — TELEPHONE (OUTPATIENT)
Age: 60
End: 2025-01-08

## 2025-01-08 DIAGNOSIS — N30.00 ACUTE CYSTITIS WITHOUT HEMATURIA: Primary | ICD-10-CM

## 2025-01-08 RX ORDER — FLUCONAZOLE 100 MG/1
100 TABLET ORAL DAILY
Qty: 5 TABLET | Refills: 0 | Status: SHIPPED | OUTPATIENT
Start: 2025-01-08 | End: 2025-01-14

## 2025-01-08 RX ORDER — CIPROFLOXACIN 500 MG/1
500 TABLET, FILM COATED ORAL EVERY 12 HOURS SCHEDULED
Qty: 20 TABLET | Refills: 0 | Status: SHIPPED | OUTPATIENT
Start: 2025-01-08 | End: 2025-01-18

## 2025-01-08 NOTE — TELEPHONE ENCOUNTER
STEPHANIE - amaris ABURTO - UTI ( home test, positive for UTI)    WHEN - symptoms started 2 days ( frequency)    How Often/Duration -    Pain -    Alleviating Factors (anything they tried to use to help so far) -    Next Steps - requesting antibiotics    Callback- caregiver      Caregiver notice pen marks around the top of the penis and thinks this might be the cause of the frequent UTI's.

## 2025-01-09 NOTE — TELEPHONE ENCOUNTER
Let caregiver know I sent in antibiotic to pharmacy. Ciprofloxacin 500 mg twice daily for 10 days and diflucan 100 mg daily for 5 days. He should be taking the antibiotic with food to avoid GI upset.

## 2025-01-13 NOTE — PROGRESS NOTES
Name: Jairon Oconnell      : 1965      MRN: [id number]  Encounter Provider: Shirlene Jovel PA-C  Encounter Date: 2025   Encounter department: [facility] [place]  :  Assessment & Plan  Recurrent UTI  US recently negative  Currently taking cipro for UTI. No culture for review. Discussed importance of needing urine culture. Standing culture placed.   Conservative measures  Trial of Hiprex and vitamin C. Discussed not taking this with antibiotics. BMP ordered  Follow up in 6 months   Orders:    Urine culture; Standing    methenamine hippurate (HIPREX) 1 g tablet; Take 1 tablet (1 g total) by mouth 2 (two) times a day with meals    Basic metabolic panel; Future    Type 2 diabetes mellitus with diabetic neuropathy, without long-term current use of insulin ([facility])    Lab Results   Component Value Date    HGBA1C 6.4 (H) 2024   Follows with PCP              History of Present Illness   Jairon Oconnell is a 59 y.o. male who presents for follow up.    Patient with CP. Patient present with caregiver who provides history. Patient was hospitalized in 2024 for UTI and received antibiotics. CT at that time showing thickening of left renal pelvis and ureter. Repeat imaging negative.   Patient does have prediabetes and uses metformin.  Sugars have been controlled per verbal report.  Kidney function stable and at baseline. Patient currently has UTI per at home test and PCP ordered cipro. No culture was performed. Caregiver admits to patient sticking objects into urethra including pens.     PVR today 17 mL    Review of Systems   Unable to perform ROS: Other          Objective   BP (!) 182/104 (Patient Position: Sitting, Cuff Size: Standard)   Pulse 101   Temp 98 °F (36.7 °C) (Temporal)   Ht 6' (1.829 m)   Wt 84.8 kg (187 lb)   SpO2 98%   BMI 25.36 kg/m²     Physical Exam  Constitutional:       Appearance: Normal appearance.   HENT:      Head: Normocephalic.      Nose: Nose normal.       Mouth/Throat:      Pharynx: Oropharynx is clear.   Eyes:      Extraocular Movements: Extraocular movements intact.      Pupils: Pupils are equal, round, and reactive to light.   Pulmonary:      Effort: Pulmonary effort is normal.   Musculoskeletal:         General: Normal range of motion.      Cervical back: Normal range of motion.   Skin:     General: Skin is warm.   Neurological:      General: No focal deficit present.      Mental Status: He is alert and oriented to person, place, and time. Mental status is at baseline.   Psychiatric:         Mood and Affect: Mood normal.         Behavior: Behavior normal.          Results  Lab Results   Component Value Date    PSA 0.7 11/09/2022    PSA 1.1 07/07/2022    PSA 1.5 02/25/2020     Lab Results   Component Value Date    GLUCOSE 203 (H) 06/03/2024    CALCIUM 8.7 08/22/2024    K 4.3 08/22/2024    CO2 21 08/22/2024     08/22/2024    BUN 14 08/22/2024    CREATININE 0.58 (L) 08/22/2024     Lab Results   Component Value Date    WBC 7.01 08/22/2024    HGB 9.4 (L) 08/22/2024    HCT 30.8 (L) 08/22/2024    MCV 85 08/22/2024     08/22/2024       Office Urine Dip  Recent Results (from the past hour)   POCT Measure PVR    Collection Time: 01/14/25  3:03 PM   Result Value Ref Range    POST-VOID RESIDUAL VOLUME, ML POC 14 mL   ]

## 2025-01-14 ENCOUNTER — OFFICE VISIT (OUTPATIENT)
Dept: UROLOGY | Facility: CLINIC | Age: 60
End: 2025-01-14
Payer: MEDICARE

## 2025-01-14 VITALS
WEIGHT: 187 LBS | SYSTOLIC BLOOD PRESSURE: 182 MMHG | TEMPERATURE: 98 F | HEART RATE: 101 BPM | OXYGEN SATURATION: 98 % | BODY MASS INDEX: 25.33 KG/M2 | DIASTOLIC BLOOD PRESSURE: 104 MMHG | HEIGHT: 72 IN

## 2025-01-14 DIAGNOSIS — N28.89 DILATED RENAL PELVIS: Primary | ICD-10-CM

## 2025-01-14 DIAGNOSIS — E11.40 TYPE 2 DIABETES MELLITUS WITH DIABETIC NEUROPATHY, WITHOUT LONG-TERM CURRENT USE OF INSULIN (HCC): ICD-10-CM

## 2025-01-14 DIAGNOSIS — N39.0 RECURRENT UTI: ICD-10-CM

## 2025-01-14 LAB — POST-VOID RESIDUAL VOLUME, ML POC: 14 ML

## 2025-01-14 PROCEDURE — 51798 US URINE CAPACITY MEASURE: CPT | Performed by: PHYSICIAN ASSISTANT

## 2025-01-14 PROCEDURE — 99213 OFFICE O/P EST LOW 20 MIN: CPT | Performed by: PHYSICIAN ASSISTANT

## 2025-01-14 RX ORDER — BENZTROPINE MESYLATE 0.5 MG/1
TABLET ORAL
COMMUNITY
Start: 2025-01-10

## 2025-01-14 RX ORDER — METHENAMINE HIPPURATE 1000 MG/1
1 TABLET ORAL 2 TIMES DAILY WITH MEALS
Qty: 180 TABLET | Refills: 3 | Status: SHIPPED | OUTPATIENT
Start: 2025-01-14

## 2025-02-03 ENCOUNTER — TELEPHONE (OUTPATIENT)
Age: 60
End: 2025-02-03

## 2025-02-03 NOTE — TELEPHONE ENCOUNTER
Pt's caregiver, Betty called. Depakote 500 mg DR WEEMS) requires a prior authorization. Betty provided the following prescription drug coverage information:    [employer]  phone # [phone]  ID# [id number]  BIN - [id number]  [facility] [employer]  group - [id number]    Routed to the clinical team to assist with the prior auth.

## 2025-02-04 NOTE — TELEPHONE ENCOUNTER
Brand Depakote DR PA approved through 12/31/2099. Please see below:    - Outcome  Approved on February 3 by [employer] 2017  Approved. This drug has been approved under the Member's Medicare Part D benefit for DEPAKOTE Tablet DR 500MG. Approved quantity: 90 per 30 day(s). You may fill up to a 90 day supply except for those on Specialty Tier 5, which can be filled up to a 30 day supply. Please call the pharmacy to process the prescription claim. Authorization Expiration Date: 12/31/2099.    Called [employer] to make them aware and spoke with Chet, who stated that the the claim went through and he would start working on the order now.    Called pt's caregiver, Sara, and left a detailed VM with a call back # if any further assistance is required.

## 2025-02-05 ENCOUNTER — APPOINTMENT (OUTPATIENT)
Dept: LAB | Facility: CLINIC | Age: 60
End: 2025-02-05
Payer: MEDICARE

## 2025-02-05 ENCOUNTER — TELEPHONE (OUTPATIENT)
Age: 60
End: 2025-02-05

## 2025-02-05 DIAGNOSIS — B96.20 E. COLI UTI: Primary | ICD-10-CM

## 2025-02-05 DIAGNOSIS — N39.0 E. COLI UTI: Primary | ICD-10-CM

## 2025-02-05 DIAGNOSIS — N39.0 RECURRENT UTI: ICD-10-CM

## 2025-02-05 PROCEDURE — 87186 SC STD MICRODIL/AGAR DIL: CPT

## 2025-02-05 PROCEDURE — 87086 URINE CULTURE/COLONY COUNT: CPT

## 2025-02-05 PROCEDURE — 87077 CULTURE AEROBIC IDENTIFY: CPT

## 2025-02-05 RX ORDER — CIPROFLOXACIN 500 MG/1
500 TABLET, FILM COATED ORAL EVERY 12 HOURS SCHEDULED
Qty: 28 TABLET | Refills: 0 | Status: SHIPPED | OUTPATIENT
Start: 2025-02-05 | End: 2025-02-19

## 2025-02-05 NOTE — TELEPHONE ENCOUNTER
Caregiver calling to inform that she is collecting a urine sample. She tested him at home and was positive for leukocytes and nitrates.    She spoke with PCP office and they are starting patient on antibiotics. Informed her to hold the Hiprex while on the antibiotics, verbalize understanding

## 2025-02-05 NOTE — TELEPHONE ENCOUNTER
Betty called in and stated that Jairon has another UTI.  They are questioning if it really ever went away.  He did take his last complete round of ABX and was ok for about a week.  He did go to the urologist and have testing done but nothing really showed why patient is getting UTI's.    Patient has UTI symptoms - tested positive for Leuks/Nitrates.  The have a cup and an order for a UC&S so they will take a specimen to the lab.    They are requesting medication be called in right away to [facility].  Any questions please call.

## 2025-02-06 ENCOUNTER — RESULTS FOLLOW-UP (OUTPATIENT)
Dept: UROLOGY | Facility: CLINIC | Age: 60
End: 2025-02-06

## 2025-02-06 DIAGNOSIS — N39.0 RECURRENT UTI: Primary | ICD-10-CM

## 2025-02-06 RX ORDER — NITROFURANTOIN 25; 75 MG/1; MG/1
100 CAPSULE ORAL 2 TIMES DAILY
Qty: 10 CAPSULE | Refills: 0 | Status: SHIPPED | OUTPATIENT
Start: 2025-02-06 | End: 2025-02-11

## 2025-02-06 NOTE — TELEPHONE ENCOUNTER
LVM per cc relaying Shara ADAM message   Prelim urine culture positive. Abx sent to pharmacy. Hold Hiprex while taking. May need to adjust abx based on final culture  Provided office number.  ----- Message from Shara Antonio PA-C sent at 2/6/2025  2:38 PM EST -----  Prelim urine culture positive. Abx sent to pharmacy. Hold Hiprex while taking. May need to adjust abx based on final culture

## 2025-02-07 LAB — BACTERIA UR CULT: ABNORMAL

## 2025-02-07 NOTE — TELEPHONE ENCOUNTER
LVM providing office number    If pt calls back please relay Shara ADAM message,  Abx sent is appropriate

## 2025-02-18 ENCOUNTER — TELEPHONE (OUTPATIENT)
Dept: NEUROLOGY | Facility: CLINIC | Age: 60
End: 2025-02-18

## 2025-02-24 ENCOUNTER — APPOINTMENT (OUTPATIENT)
Dept: LAB | Facility: CLINIC | Age: 60
End: 2025-02-24
Payer: MEDICARE

## 2025-02-24 DIAGNOSIS — N39.0 RECURRENT UTI: ICD-10-CM

## 2025-02-24 DIAGNOSIS — M81.0 OSTEOPOROSIS: ICD-10-CM

## 2025-02-24 DIAGNOSIS — N39.0 RECURRENT UTI: Primary | ICD-10-CM

## 2025-02-24 DIAGNOSIS — G40.309 GENERALIZED CONVULSIVE EPILEPSY (HCC): Chronic | ICD-10-CM

## 2025-02-24 LAB
25(OH)D3 SERPL-MCNC: 31.1 NG/ML (ref 30–100)
ANION GAP SERPL CALCULATED.3IONS-SCNC: 11 MMOL/L (ref 4–13)
BUN SERPL-MCNC: 19 MG/DL (ref 5–25)
CALCIUM SERPL-MCNC: 9.6 MG/DL (ref 8.4–10.2)
CHLORIDE SERPL-SCNC: 105 MMOL/L (ref 96–108)
CO2 SERPL-SCNC: 26 MMOL/L (ref 21–32)
CREAT SERPL-MCNC: 0.75 MG/DL (ref 0.6–1.3)
GFR SERPL CREATININE-BSD FRML MDRD: 101 ML/MIN/1.73SQ M
GLUCOSE P FAST SERPL-MCNC: 116 MG/DL (ref 65–99)
LACOSAMIDE SERPL-MCNC: 5.23 UG/ML (ref 1–20)
LEVETIRACETAM SERPL-MCNC: 32.2 UG/ML (ref 12–46)
POTASSIUM SERPL-SCNC: 4.4 MMOL/L (ref 3.5–5.3)
SODIUM SERPL-SCNC: 142 MMOL/L (ref 135–147)
VALPROATE SERPL-MCNC: 84 UG/ML (ref 50–100)

## 2025-02-24 PROCEDURE — 80048 BASIC METABOLIC PNL TOTAL CA: CPT

## 2025-02-24 PROCEDURE — 83520 IMMUNOASSAY QUANT NOS NONAB: CPT

## 2025-02-24 PROCEDURE — 80235 DRUG ASSAY LACOSAMIDE: CPT

## 2025-02-24 PROCEDURE — 36415 COLL VENOUS BLD VENIPUNCTURE: CPT

## 2025-02-24 PROCEDURE — 82306 VITAMIN D 25 HYDROXY: CPT

## 2025-02-24 PROCEDURE — 80164 ASSAY DIPROPYLACETIC ACD TOT: CPT

## 2025-02-24 PROCEDURE — 80203 DRUG SCREEN QUANT ZONISAMIDE: CPT

## 2025-02-24 RX ORDER — SULFAMETHOXAZOLE AND TRIMETHOPRIM 800; 160 MG/1; MG/1
1 TABLET ORAL EVERY 12 HOURS SCHEDULED
Qty: 14 TABLET | Refills: 0 | Status: SHIPPED | OUTPATIENT
Start: 2025-02-24 | End: 2025-03-03

## 2025-02-24 RX ORDER — SACCHAROMYCES BOULARDII 250 MG
250 CAPSULE ORAL 2 TIMES DAILY
Qty: 60 CAPSULE | Refills: 2 | Status: SHIPPED | OUTPATIENT
Start: 2025-02-24 | End: 2025-05-25

## 2025-02-25 ENCOUNTER — OFFICE VISIT (OUTPATIENT)
Dept: AUDIOLOGY | Age: 60
End: 2025-02-25
Payer: MEDICARE

## 2025-02-25 DIAGNOSIS — H90.3 SENSORY HEARING LOSS, BILATERAL: Primary | ICD-10-CM

## 2025-02-25 PROCEDURE — 92567 TYMPANOMETRY: CPT | Performed by: AUDIOLOGIST

## 2025-02-25 PROCEDURE — 92555 SPEECH THRESHOLD AUDIOMETRY: CPT | Performed by: AUDIOLOGIST

## 2025-02-25 PROCEDURE — 92552 PURE TONE AUDIOMETRY AIR: CPT | Performed by: AUDIOLOGIST

## 2025-02-25 NOTE — PROGRESS NOTES
Diagnostic Hearing Evaluation    Name:  Jairon Oconnell  :  1965  Age:  59 y.o.   MRN:  [id number]  Date of Evaluation: 25     HISTORY:     Reason for visit: Annual Hearing Test    Jairon Oconnell is being seen today at the request of Dr. Villafana for an annual evaluation of hearing. The patient's caregiver reports  no concerns for Jairon's hearing . Jairon communicates effectively without needing repetition.     EVALUATION:    Otoscopic Evaluation:   Right Ear: Non-occluding cerumen   Left Ear: Non-occluding cerumen    Tympanometry:   Right Ear: Type A; normal middle ear pressure and static compliance    Left Ear: Type A; normal middle ear pressure and static compliance     Speech Audiometry:  Speech Reception (SRT)    Right Ear: 40 dB HL    Left Ear: 25 dB HL    Pure Tone Audiometry:  Conventional pure tone audiometry from 500 - 4000 Hz was obtained with fair to poor reliability and revealed the following:     Right Ear: Normal hearing sensitivity to moderate hearing loss   Left Ear: Normal hearing sensitivity to mild hearing loss       **responses are likely supra-threshold  *see attached audiogram    IMPRESSIONS:   Jairon has access to speech and language based on responses provided, however responses are likely supra-threshold given poor reliability. Jairon's caregivers report no issues communicating with Jairon at normal conversational volume.     RECOMMENDATIONS:  Annual hearing eval, Return to [facility]. for F/U, and Copy to Patient/Caregiver    PATIENT EDUCATION:   The results of today's results and recommendations were reviewed with the caregiver and his hearing thresholds were explained at length. Treatment options, including amplification and communication strategies, were discussed as appropriate. The caregiver voiced understanding of his test results. Questions were addressed and the patient was encouraged to contact our department should concerns arise.      Noel Sal., CCC-A  Clinical  Audiologist  [facility]  [address] [address]

## 2025-02-26 ENCOUNTER — OFFICE VISIT (OUTPATIENT)
Dept: NEUROLOGY | Facility: CLINIC | Age: 60
End: 2025-02-26
Payer: MEDICARE

## 2025-02-26 VITALS
HEART RATE: 98 BPM | HEIGHT: 72 IN | SYSTOLIC BLOOD PRESSURE: 141 MMHG | WEIGHT: 178 LBS | BODY MASS INDEX: 24.11 KG/M2 | DIASTOLIC BLOOD PRESSURE: 81 MMHG

## 2025-02-26 DIAGNOSIS — F69 BEHAVIOR CONCERN IN ADULT: ICD-10-CM

## 2025-02-26 DIAGNOSIS — G40.309 GENERALIZED CONVULSIVE EPILEPSY (HCC): Chronic | ICD-10-CM

## 2025-02-26 LAB — ZONISAMIDE SERPL-MCNC: 11.7 UG/ML (ref 10–40)

## 2025-02-26 PROCEDURE — 99214 OFFICE O/P EST MOD 30 MIN: CPT | Performed by: PSYCHIATRY & NEUROLOGY

## 2025-02-26 PROCEDURE — G2211 COMPLEX E/M VISIT ADD ON: HCPCS | Performed by: PSYCHIATRY & NEUROLOGY

## 2025-02-26 RX ORDER — LACOSAMIDE 200 MG/1
TABLET ORAL
Qty: 30 TABLET | Refills: 5 | Status: SHIPPED | OUTPATIENT
Start: 2025-02-26

## 2025-02-26 RX ORDER — LEVETIRACETAM 750 MG/1
TABLET ORAL
Qty: 120 TABLET | Refills: 11 | Status: SHIPPED | OUTPATIENT
Start: 2025-02-26

## 2025-02-26 RX ORDER — DIVALPROEX SODIUM 500 MG
TABLET, DELAYED RELEASE (ENTERIC COATED) ORAL
Qty: 90 TABLET | Refills: 11 | Status: SHIPPED | OUTPATIENT
Start: 2025-02-26

## 2025-02-26 RX ORDER — ZONISAMIDE 100 MG/1
300 CAPSULE ORAL
Qty: 90 CAPSULE | Refills: 11 | Status: SHIPPED | OUTPATIENT
Start: 2025-02-26

## 2025-02-26 RX ORDER — LACOSAMIDE 150 MG/1
TABLET ORAL
Qty: 30 TABLET | Refills: 5 | Status: SHIPPED | OUTPATIENT
Start: 2025-02-26

## 2025-02-26 NOTE — PROGRESS NOTES
Name: Jairon Oconnell      : 1965      MRN: [id number]  Encounter Provider: Keyur Banerjee MD  Encounter Date: 2025   Encounter department: [facility]  :  Assessment & Plan  Generalized convulsive epilepsy (HCC)  Overall he has been doing well on his current medications.  He has been having some issues with occasional myoclonus, but this is infrequent and typically related to illness/recurrent UTIs.  Given these episodes of myoclonus, it would be best to continue his seizure medications at current doses.  We did discuss possibly adjusting medicines, but his caregiver preferred to predominantly work on controlling his urinary tract infections at this point.    --He will continue divalproex, lacosamide, levetiracetam, and zonisamide unchanged.  In the future we could consider potentially decreasing his lacosamide or zonisamide dosing  Orders:    Depakote 500 MG DR tablet; Take 1 tab in the morning and 2 tabs at night. Brand necessary    lacosamide (VIMPAT) 200 mg tablet; Take 1 tab (200 mg) at night.    lacosamide (VIMPAT) 150 mg tablet; Take 1 tab (150 mg) in the morning.    levETIRAcetam (KEPPRA) 750 mg tablet; Give 2 tabs (1500 mg) by mouth twice a day    zonisamide (ZONEGRAN) 100 mg capsule; Take 3 capsules (300 mg total) by mouth daily at bedtime    Behavior concern in adult  His behaviors have been under much better control since he has been with his current caregivers.  Will continue to monitor           He will Return in about 6 months (around 2025).      History of Present Illness   HPI  Jairon Oconnell is a 59 y.o. male with cerebral palsy, generalized epilepsy, and behavioral disorder, who is returning to Neurology office for follow up of his seizures.     Current seizure medications:  1. Zonisamide 300 mg nightly  2. Lacosamide 150 mg in the morning and 200 mg at night  3. Levetiracetam 1500 mg twice a day  4. Depakote (Brand) 500 mg in the morning and 1000 mg at  night   5. Gabapentin 300 mg three times a day   Other medications as per Epic.    Since his last visit,  last month, he had an episode of some more frequent myoclonic jerks, but this didn't progress any further and was self limited. He has been having more frequent issues with UTIs. He is now on multiple preventative medications to help with the UTIs. Most of his myoclonic jerks appear to be associated with when he is sick.    He has not had any additional falls. No other concerns.His caregiver feels he has been tolerating his medications well without side effects.      I did review his recent blood work that was on 2/24/2025    Prior Seizure Medications: none other than current medications    His history was also obtained from his caregiver, who was present at today's visit.     Review of Systems  I personally reviewed the review of systems that was entered by the medical assistant in a separate note       Objective   /81 (BP Location: Left arm, Patient Position: Sitting, Cuff Size: Large)   Pulse 98   Ht 6' (1.829 m)   Wt 80.7 kg (178 lb)   BMI 24.14 kg/m²      Physical Exam    Voice recognition software was used in the generation of this note. There may be unintentional errors including grammatical errors, spelling errors, or pronoun errors.

## 2025-02-26 NOTE — PROGRESS NOTES
Review of Systems   Constitutional:  Negative for appetite change, fatigue and fever.   HENT: Negative.  Negative for hearing loss, tinnitus, trouble swallowing and voice change.    Eyes: Negative.  Negative for photophobia, pain and visual disturbance.   Respiratory: Negative.  Negative for shortness of breath.    Cardiovascular: Negative.  Negative for palpitations.   Gastrointestinal: Negative.  Negative for nausea and vomiting.   Endocrine: Negative.  Negative for cold intolerance.   Genitourinary: Negative.  Negative for dysuria, frequency and urgency.   Musculoskeletal:  Negative for back pain, gait problem, myalgias, neck pain and neck stiffness.   Skin: Negative.  Negative for rash.   Allergic/Immunologic: Negative.    Neurological:  Positive for seizures (occ jerks). Negative for dizziness, tremors, syncope, facial asymmetry, speech difficulty, weakness, light-headedness, numbness and headaches.   Hematological: Negative.  Does not bruise/bleed easily.   Psychiatric/Behavioral: Negative.  Negative for confusion, hallucinations and sleep disturbance.

## 2025-02-26 NOTE — ASSESSMENT & PLAN NOTE
His behaviors have been under much better control since he has been with his current caregivers.  Will continue to monitor

## 2025-02-26 NOTE — ASSESSMENT & PLAN NOTE
Overall he has been doing well on his current medications.  He has been having some issues with occasional myoclonus, but this is infrequent and typically related to illness/recurrent UTIs.  Given these episodes of myoclonus, it would be best to continue his seizure medications at current doses.  We did discuss possibly adjusting medicines, but his caregiver preferred to predominantly work on controlling his urinary tract infections at this point.    --He will continue divalproex, lacosamide, levetiracetam, and zonisamide unchanged.  In the future we could consider potentially decreasing his lacosamide or zonisamide dosing  Orders:    Depakote 500 MG DR tablet; Take 1 tab in the morning and 2 tabs at night. Brand necessary    lacosamide (VIMPAT) 200 mg tablet; Take 1 tab (200 mg) at night.    lacosamide (VIMPAT) 150 mg tablet; Take 1 tab (150 mg) in the morning.    levETIRAcetam (KEPPRA) 750 mg tablet; Give 2 tabs (1500 mg) by mouth twice a day    zonisamide (ZONEGRAN) 100 mg capsule; Take 3 capsules (300 mg total) by mouth daily at bedtime     Pt is agreeable to stop omeprazole and start pantoprazole. No questions at this time.

## 2025-03-10 ENCOUNTER — TELEPHONE (OUTPATIENT)
Dept: FAMILY MEDICINE CLINIC | Facility: CLINIC | Age: 60
End: 2025-03-10

## 2025-03-10 NOTE — TELEPHONE ENCOUNTER
Called Betty to ask if she would like Negin to put in for a Barium swallow test or keep her rescheduled appt for 3/31.  I know sometimes it's tough for them to get Pt up & going, so thought it might be a little easier for them.

## 2025-03-24 DIAGNOSIS — E06.3 HYPOTHYROIDISM DUE TO HASHIMOTO'S THYROIDITIS: ICD-10-CM

## 2025-03-24 DIAGNOSIS — D50.8 IRON DEFICIENCY ANEMIA SECONDARY TO INADEQUATE DIETARY IRON INTAKE: Chronic | ICD-10-CM

## 2025-03-25 RX ORDER — LEVOTHYROXINE SODIUM 150 UG/1
TABLET ORAL
Qty: 28 TABLET | Refills: 5 | Status: SHIPPED | OUTPATIENT
Start: 2025-03-25

## 2025-03-25 RX ORDER — LANOLIN ALCOHOL/MO/W.PET/CERES
CREAM (GRAM) TOPICAL
Qty: 56 CAPSULE | Refills: 1 | Status: SHIPPED | OUTPATIENT
Start: 2025-03-25

## 2025-04-04 ENCOUNTER — TELEPHONE (OUTPATIENT)
Dept: FAMILY MEDICINE CLINIC | Facility: CLINIC | Age: 60
End: 2025-04-04

## 2025-04-04 DIAGNOSIS — R13.10 DYSPHAGIA, UNSPECIFIED TYPE: Primary | ICD-10-CM

## 2025-04-04 NOTE — TELEPHONE ENCOUNTER
Spoke with pt's caregiver. She needs a referral for a swallowing study, pt is having difficulty swallowing and is very difficult to get him out of the house, so she would really appreciate if provider can just put the referral in.

## 2025-04-21 DIAGNOSIS — G93.41 METABOLIC ENCEPHALOPATHY: ICD-10-CM

## 2025-04-21 DIAGNOSIS — N39.0 RECURRENT UTI: ICD-10-CM

## 2025-04-21 RX ORDER — POLYETHYLENE GLYCOL 3350 17 G/17G
17 POWDER, FOR SOLUTION ORAL DAILY
Qty: 238 G | Refills: 0 | Status: SHIPPED | OUTPATIENT
Start: 2025-04-21 | End: 2025-05-05

## 2025-04-21 NOTE — TELEPHONE ENCOUNTER
Reason for call:   [x] Refill   [] Prior Auth  [] Other:     Office:   [x] PCP/Provider - Broderick  [] Specialty/Provider -     Medication:   Polyethylene glycol 17 g packet    Pharmacy:   [facility]    Local Pharmacy   Does the patient have enough for 3 days?   [] Yes   [x] No - Send as HP to POD    Mail Away Pharmacy   Does the patient have enough for 10 days?   [] Yes   [] No - Send as HP to POD

## 2025-04-22 RX ORDER — METHENAMINE HIPPURATE 1000 MG/1
TABLET ORAL
Qty: 60 TABLET | Refills: 1 | Status: SHIPPED | OUTPATIENT
Start: 2025-04-22

## 2025-04-22 RX ORDER — NICOTINE 14MG/24HR
PATCH, TRANSDERMAL 24 HOURS TRANSDERMAL
Qty: 56 CAPSULE | Refills: 2 | Status: SHIPPED | OUTPATIENT
Start: 2025-04-22

## 2025-04-30 ENCOUNTER — TELEPHONE (OUTPATIENT)
Dept: NEUROLOGY | Facility: CLINIC | Age: 60
End: 2025-04-30

## 2025-04-30 NOTE — TELEPHONE ENCOUNTER
Depakote PA submitted in CMM and received this immediate notification:    - Outcome  Additional Information Required  Prior Authorization Not Required    Key: [id number]    Will call plan tomorrow to confirm that brand name Depakote is covered.

## 2025-04-30 NOTE — TELEPHONE ENCOUNTER
Reason for call:   [x] Prior Auth  [x] Other: Letter scanned in media from 1/2025     Caller:  [x] Patient  [] Pharmacy  Name:   Address:   Callback Number:     Medication: Depakote 500 MG DR Take 1 tab in the morning and 2 tabs at night - Pt needs Brand Approved       Ordering Provider:   [] PCP/Provider -   [x] Speciality/Provider - [facility]     Has the patient tried other medications and failed? If failed, which medications did they fail?    [] No   [x] Yes -     Is the patient's insurance updated in EPIC?   [] Yes   [] No     Is a copy of the patient's insurance scanned in EPIC?   [] Yes   [] No        [facility] Medicare Rx Saver   ID-[id number]  [phone]

## 2025-05-01 NOTE — TELEPHONE ENCOUNTER
Called [facility] re: below and spoke with Jordin, who stated that a PA already exists for this medication and it was approved through 12/31/2099. Please see telephone encounter from 2/3/25.    Located approved PA in [facility] and scanned into Media tab.

## 2025-05-07 DIAGNOSIS — E11.40 TYPE 2 DIABETES MELLITUS WITH DIABETIC NEUROPATHY, WITHOUT LONG-TERM CURRENT USE OF INSULIN (HCC): Primary | Chronic | ICD-10-CM

## 2025-05-08 NOTE — SPEECH THERAPY NOTE
Speech Language/Pathology    Speech/Language Pathology Progress Note    Patient Name: Jairon Oconnell  Today's Date: 6/9/2024     Problem List  Principal Problem:    Cardiac arrest (HCC)  Active Problems:    Generalized convulsive epilepsy (HCC)    Acquired hypothyroidism    Behavior concern in adult    Ambulatory dysfunction    Acute metabolic encephalopathy    Dysphagia    Closed traumatic fracture of ribs of right side with pneumothorax    Acute respiratory failure with hypoxia and hypercapnia ([facility])       Past Medical History  Past Medical History:   Diagnosis Date    ADRIANA (acute kidney injury) ([facility]) 9/24/2019    Bacteremia due to Gram-positive bacteria 9/7/2021    Buttock wound, left, subsequent encounter 11/5/2021    COVID-19     CP (cerebral palsy) ([facility])     Depression     Diabetes ([facility])     Disease of thyroid gland     Gait disorder     Gluteal abscess 9/6/2021    Hyperlipidemia     Hypertension     Kidney failure     Kidney stones     Moderate protein-calorie malnutrition ([facility]) 12/22/2021    Osteoporosis     Pressure injury of left buttock, stage 4 ([facility]) 3/9/2022    Psychiatric disorder     Scoliosis     Seizures ([facility])     Sepsis ([facility]) 9/25/2019    Thyroid disease     UTI (urinary tract infection)         Past Surgical History  Past Surgical History:   Procedure Laterality Date    APPENDECTOMY      IR PICC PLACEMENT SINGLE LUMEN  9/13/2021    IR PICC PLACEMENT SINGLE LUMEN  9/16/2021         Subjective:  Pt received awake and alert in bed.Clinician positioned bed upright at 90' for breakfast meal. Pt reported that he was hungry and wanted to eat. Nurse reported pt was coughing with HTL from dinner yesterday.     Objective:  The following consistencies were administered today: puree and HTL. Foods included puree eggs and sausage, pureed banana and HTL orange juice. Administered puree eggs and sausage- bolus retireval and containment were adequate, oral mastication was prolonged but complete. A-P transfer of  bolus was noted to be quicker but functional, swallow initiation was delayed. No overt s/s of aspiration noted or changes in respiratory status.     Administered HTL by tsp only. Pt finished 4oz of HTL orange juice. No overt s/s of aspiration or changes in vocal quality/respiratory status noted. O2 saturation remains 98% on 6L NC.    Assessment:  Pt continues to present with oropharyngeal dysphagia- pt is managing well on current diet and benefits from verbal cues of making sure food is fully chewed before swallowing.     Plan/Recommendations:  Continue on puree/honey thick liquids.  Meds crushed  1:1 full feeding assist  Alternate solids and liquids  Upright 90' for all meals  ST follow-up x1-3      Statement Selected

## 2025-05-12 ENCOUNTER — RA CDI HCC (OUTPATIENT)
Dept: OTHER | Facility: HOSPITAL | Age: 60
End: 2025-05-12

## 2025-05-12 PROBLEM — E83.40 DISORDERS OF MAGNESIUM METABOLISM, UNSPECIFIED: Status: ACTIVE | Noted: 2025-05-12

## 2025-05-12 PROBLEM — Z13.1 SCREENING FOR DIABETES MELLITUS: Chronic | Status: RESOLVED | Noted: 2024-09-06 | Resolved: 2025-05-12

## 2025-05-12 PROBLEM — D51.8 VITAMIN B12 DEFICIENCY (DIETARY) ANEMIA: Chronic | Status: ACTIVE | Noted: 2025-05-12

## 2025-05-12 PROBLEM — Z79.899 ON LONG TERM DRUG THERAPY: Status: ACTIVE | Noted: 2025-05-12

## 2025-05-12 PROBLEM — D69.1 THROMBOCYTOPATHIA (HCC): Chronic | Status: ACTIVE | Noted: 2020-03-30

## 2025-05-12 PROBLEM — Z00.00 MEDICARE ANNUAL WELLNESS VISIT, SUBSEQUENT: Chronic | Status: RESOLVED | Noted: 2024-10-06 | Resolved: 2025-05-12

## 2025-05-12 PROBLEM — E83.40 DISORDERS OF MAGNESIUM METABOLISM, UNSPECIFIED: Chronic | Status: ACTIVE | Noted: 2025-05-12

## 2025-05-12 PROBLEM — Z23 ENCOUNTER FOR IMMUNIZATION: Status: RESOLVED | Noted: 2024-10-06 | Resolved: 2025-05-12

## 2025-05-12 PROBLEM — Z79.899 ON LONG TERM DRUG THERAPY: Chronic | Status: ACTIVE | Noted: 2025-05-12

## 2025-05-12 PROBLEM — D52.8 OTHER FOLATE DEFICIENCY ANEMIAS: Chronic | Status: ACTIVE | Noted: 2025-05-12

## 2025-05-12 PROBLEM — R73.01 IMPAIRED FASTING GLUCOSE: Status: ACTIVE | Noted: 2025-05-12

## 2025-05-12 PROBLEM — D52.8 OTHER FOLATE DEFICIENCY ANEMIAS: Status: ACTIVE | Noted: 2025-05-12

## 2025-05-12 PROBLEM — E78.00 PURE HYPERCHOLESTEROLEMIA: Status: ACTIVE | Noted: 2025-05-12

## 2025-05-12 PROBLEM — E11.9 ENCOUNTER FOR DIABETIC FOOT EXAM (HCC): Chronic | Status: RESOLVED | Noted: 2024-10-06 | Resolved: 2025-05-12

## 2025-05-12 PROBLEM — E78.00 PURE HYPERCHOLESTEROLEMIA: Chronic | Status: ACTIVE | Noted: 2025-05-12

## 2025-05-12 PROBLEM — E11.9 DIABETIC EYE EXAM (HCC): Chronic | Status: RESOLVED | Noted: 2024-10-06 | Resolved: 2025-05-12

## 2025-05-12 PROBLEM — Z01.00 DIABETIC EYE EXAM (HCC): Chronic | Status: RESOLVED | Noted: 2024-10-06 | Resolved: 2025-05-12

## 2025-05-12 PROBLEM — Z12.5 SCREENING FOR MALIGNANT NEOPLASM OF PROSTATE: Status: ACTIVE | Noted: 2025-05-12

## 2025-05-12 PROBLEM — D51.8 VITAMIN B12 DEFICIENCY (DIETARY) ANEMIA: Status: ACTIVE | Noted: 2025-05-12

## 2025-05-12 PROBLEM — S72.114D: Chronic | Status: ACTIVE | Noted: 2024-02-04

## 2025-05-12 PROBLEM — Z13.29 SCREENING FOR THYROID DISORDER: Chronic | Status: RESOLVED | Noted: 2024-09-06 | Resolved: 2025-05-12

## 2025-05-12 PROBLEM — Z09 HOSPITAL DISCHARGE FOLLOW-UP: Chronic | Status: RESOLVED | Noted: 2024-09-06 | Resolved: 2025-05-12

## 2025-05-12 PROBLEM — Z12.5 SCREENING FOR PROSTATE CANCER: Chronic | Status: RESOLVED | Noted: 2024-09-06 | Resolved: 2025-05-12

## 2025-05-12 PROBLEM — Z12.5 SCREENING FOR MALIGNANT NEOPLASM OF PROSTATE: Status: RESOLVED | Noted: 2025-05-12 | Resolved: 2025-05-12

## 2025-05-14 DIAGNOSIS — D50.8 IRON DEFICIENCY ANEMIA SECONDARY TO INADEQUATE DIETARY IRON INTAKE: Chronic | ICD-10-CM

## 2025-05-14 RX ORDER — LANOLIN ALCOHOL/MO/W.PET/CERES
CREAM (GRAM) TOPICAL
Qty: 56 CAPSULE | Refills: 5 | Status: SHIPPED | OUTPATIENT
Start: 2025-05-14

## 2025-05-28 ENCOUNTER — HOSPITAL ENCOUNTER (INPATIENT)
Facility: HOSPITAL | Age: 60
LOS: 1 days | Discharge: HOME | DRG: 379 | End: 2025-05-30
Attending: EMERGENCY MEDICINE | Admitting: FAMILY MEDICINE
Payer: MEDICARE

## 2025-05-28 ENCOUNTER — APPOINTMENT (EMERGENCY)
Dept: CT IMAGING | Facility: HOSPITAL | Age: 60
DRG: 379 | End: 2025-05-28
Payer: MEDICARE

## 2025-05-28 DIAGNOSIS — R79.89 ELEVATED LACTIC ACID LEVEL: ICD-10-CM

## 2025-05-28 DIAGNOSIS — K62.5 RECTAL BLEEDING: Primary | ICD-10-CM

## 2025-05-28 DIAGNOSIS — T18.5XXD: ICD-10-CM

## 2025-05-28 PROBLEM — T18.5XXS: Status: ACTIVE | Noted: 2025-05-28

## 2025-05-28 LAB
ALBUMIN SERPL BCG-MCNC: 3.9 G/DL (ref 3.5–5)
ALP SERPL-CCNC: 40 U/L (ref 34–104)
ALT SERPL W P-5'-P-CCNC: 15 U/L (ref 7–52)
ANION GAP SERPL CALCULATED.3IONS-SCNC: 11 MMOL/L (ref 4–13)
APTT PPP: 28 SECONDS (ref 23–34)
AST SERPL W P-5'-P-CCNC: 15 U/L (ref 13–39)
ATRIAL RATE: 102 BPM
BASOPHILS # BLD AUTO: 0.05 THOUSANDS/ÂΜL (ref 0–0.1)
BASOPHILS NFR BLD AUTO: 1 % (ref 0–1)
BILIRUB SERPL-MCNC: 0.26 MG/DL (ref 0.2–1)
BUN SERPL-MCNC: 23 MG/DL (ref 5–25)
CALCIUM SERPL-MCNC: 8.9 MG/DL (ref 8.4–10.2)
CHLORIDE SERPL-SCNC: 109 MMOL/L (ref 96–108)
CO2 SERPL-SCNC: 21 MMOL/L (ref 21–32)
CREAT SERPL-MCNC: 0.7 MG/DL (ref 0.6–1.3)
EOSINOPHIL # BLD AUTO: 0.17 THOUSAND/ÂΜL (ref 0–0.61)
EOSINOPHIL NFR BLD AUTO: 2 % (ref 0–6)
ERYTHROCYTE [DISTWIDTH] IN BLOOD BY AUTOMATED COUNT: 13.5 % (ref 11.6–15.1)
EST. AVERAGE GLUCOSE BLD GHB EST-MCNC: 146 MG/DL
GFR SERPL CREATININE-BSD FRML MDRD: 104 ML/MIN/1.73SQ M
GLUCOSE SERPL-MCNC: 132 MG/DL (ref 65–140)
GLUCOSE SERPL-MCNC: 139 MG/DL (ref 65–140)
GLUCOSE SERPL-MCNC: 155 MG/DL (ref 65–140)
GLUCOSE SERPL-MCNC: 157 MG/DL (ref 65–140)
HBA1C MFR BLD: 6.7 %
HCT VFR BLD AUTO: 38.6 % (ref 36.5–49.3)
HGB BLD-MCNC: 12.8 G/DL (ref 12–17)
IMM GRANULOCYTES # BLD AUTO: 0.05 THOUSAND/UL (ref 0–0.2)
IMM GRANULOCYTES NFR BLD AUTO: 1 % (ref 0–2)
INR PPP: 0.9 (ref 0.85–1.19)
LACTATE SERPL-SCNC: 2.1 MMOL/L (ref 0.5–2)
LACTATE SERPL-SCNC: 2.9 MMOL/L (ref 0.5–2)
LYMPHOCYTES # BLD AUTO: 3.34 THOUSANDS/ÂΜL (ref 0.6–4.47)
LYMPHOCYTES NFR BLD AUTO: 40 % (ref 14–44)
MCH RBC QN AUTO: 32 PG (ref 26.8–34.3)
MCHC RBC AUTO-ENTMCNC: 33.2 G/DL (ref 31.4–37.4)
MCV RBC AUTO: 97 FL (ref 82–98)
MONOCYTES # BLD AUTO: 1.19 THOUSAND/ÂΜL (ref 0.17–1.22)
MONOCYTES NFR BLD AUTO: 14 % (ref 4–12)
NEUTROPHILS # BLD AUTO: 3.56 THOUSANDS/ÂΜL (ref 1.85–7.62)
NEUTS SEG NFR BLD AUTO: 42 % (ref 43–75)
NRBC BLD AUTO-RTO: 0 /100 WBCS
P AXIS: 72 DEGREES
PLATELET # BLD AUTO: 132 THOUSANDS/UL (ref 149–390)
PMV BLD AUTO: 11.2 FL (ref 8.9–12.7)
POTASSIUM SERPL-SCNC: 4.2 MMOL/L (ref 3.5–5.3)
PR INTERVAL: 168 MS
PROT SERPL-MCNC: 6.4 G/DL (ref 6.4–8.4)
PROTHROMBIN TIME: 12.9 SECONDS (ref 12.3–15)
QRS AXIS: -34 DEGREES
QRSD INTERVAL: 156 MS
QT INTERVAL: 368 MS
QTC INTERVAL: 479 MS
RBC # BLD AUTO: 4 MILLION/UL (ref 3.88–5.62)
SODIUM SERPL-SCNC: 141 MMOL/L (ref 135–147)
T WAVE AXIS: 27 DEGREES
VENTRICULAR RATE: 102 BPM
WBC # BLD AUTO: 8.36 THOUSAND/UL (ref 4.31–10.16)

## 2025-05-28 PROCEDURE — 85730 THROMBOPLASTIN TIME PARTIAL: CPT

## 2025-05-28 PROCEDURE — 74177 CT ABD & PELVIS W/CONTRAST: CPT

## 2025-05-28 PROCEDURE — 83605 ASSAY OF LACTIC ACID: CPT

## 2025-05-28 PROCEDURE — 99223 1ST HOSP IP/OBS HIGH 75: CPT | Performed by: STUDENT IN AN ORGANIZED HEALTH CARE EDUCATION/TRAINING PROGRAM

## 2025-05-28 PROCEDURE — 85025 COMPLETE CBC W/AUTO DIFF WBC: CPT

## 2025-05-28 PROCEDURE — 93005 ELECTROCARDIOGRAM TRACING: CPT

## 2025-05-28 PROCEDURE — 80053 COMPREHEN METABOLIC PANEL: CPT

## 2025-05-28 PROCEDURE — 82948 REAGENT STRIP/BLOOD GLUCOSE: CPT

## 2025-05-28 PROCEDURE — 93010 ELECTROCARDIOGRAM REPORT: CPT | Performed by: INTERNAL MEDICINE

## 2025-05-28 PROCEDURE — 36415 COLL VENOUS BLD VENIPUNCTURE: CPT

## 2025-05-28 PROCEDURE — 96374 THER/PROPH/DIAG INJ IV PUSH: CPT

## 2025-05-28 PROCEDURE — 99285 EMERGENCY DEPT VISIT HI MDM: CPT

## 2025-05-28 PROCEDURE — 85610 PROTHROMBIN TIME: CPT

## 2025-05-28 PROCEDURE — 96375 TX/PRO/DX INJ NEW DRUG ADDON: CPT

## 2025-05-28 PROCEDURE — 96361 HYDRATE IV INFUSION ADD-ON: CPT

## 2025-05-28 PROCEDURE — 83036 HEMOGLOBIN GLYCOSYLATED A1C: CPT | Performed by: PHYSICIAN ASSISTANT

## 2025-05-28 RX ORDER — BISACODYL 10 MG
10 SUPPOSITORY, RECTAL RECTAL DAILY PRN
Status: DISCONTINUED | OUTPATIENT
Start: 2025-05-28 | End: 2025-05-30 | Stop reason: HOSPADM

## 2025-05-28 RX ORDER — LISINOPRIL 5 MG/1
5 TABLET ORAL DAILY
Status: DISCONTINUED | OUTPATIENT
Start: 2025-05-29 | End: 2025-05-30 | Stop reason: HOSPADM

## 2025-05-28 RX ORDER — LEVETIRACETAM 500 MG/1
1500 TABLET ORAL EVERY 12 HOURS SCHEDULED
Status: DISCONTINUED | OUTPATIENT
Start: 2025-05-28 | End: 2025-05-30 | Stop reason: HOSPADM

## 2025-05-28 RX ORDER — DIVALPROEX SODIUM 500 MG/1
500 TABLET, DELAYED RELEASE ORAL EVERY MORNING
Status: DISCONTINUED | OUTPATIENT
Start: 2025-05-29 | End: 2025-05-30 | Stop reason: HOSPADM

## 2025-05-28 RX ORDER — HALOPERIDOL 5 MG/ML
5 INJECTION INTRAMUSCULAR ONCE
Status: COMPLETED | OUTPATIENT
Start: 2025-05-28 | End: 2025-05-28

## 2025-05-28 RX ORDER — INSULIN LISPRO 100 [IU]/ML
1-5 INJECTION, SOLUTION INTRAVENOUS; SUBCUTANEOUS EVERY 6 HOURS SCHEDULED
Status: DISCONTINUED | OUTPATIENT
Start: 2025-05-28 | End: 2025-05-29

## 2025-05-28 RX ORDER — DIVALPROEX SODIUM 500 MG/1
1000 TABLET, DELAYED RELEASE ORAL
Status: DISCONTINUED | OUTPATIENT
Start: 2025-05-28 | End: 2025-05-30 | Stop reason: HOSPADM

## 2025-05-28 RX ORDER — QUETIAPINE 200 MG/1
400 TABLET, FILM COATED, EXTENDED RELEASE ORAL
Status: DISCONTINUED | OUTPATIENT
Start: 2025-05-28 | End: 2025-05-30 | Stop reason: HOSPADM

## 2025-05-28 RX ORDER — LORAZEPAM 2 MG/ML
2 INJECTION INTRAMUSCULAR ONCE
Status: COMPLETED | OUTPATIENT
Start: 2025-05-28 | End: 2025-05-28

## 2025-05-28 RX ORDER — ZONISAMIDE 100 MG/1
300 CAPSULE ORAL
Status: DISCONTINUED | OUTPATIENT
Start: 2025-05-28 | End: 2025-05-30 | Stop reason: HOSPADM

## 2025-05-28 RX ORDER — PRAVASTATIN SODIUM 40 MG
40 TABLET ORAL
Status: DISCONTINUED | OUTPATIENT
Start: 2025-05-28 | End: 2025-05-30 | Stop reason: HOSPADM

## 2025-05-28 RX ORDER — SODIUM CHLORIDE, SODIUM LACTATE, POTASSIUM CHLORIDE, CALCIUM CHLORIDE 600; 310; 30; 20 MG/100ML; MG/100ML; MG/100ML; MG/100ML
75 INJECTION, SOLUTION INTRAVENOUS CONTINUOUS
Status: DISCONTINUED | OUTPATIENT
Start: 2025-05-28 | End: 2025-05-30 | Stop reason: HOSPADM

## 2025-05-28 RX ORDER — FOLIC ACID 0.4 MG
400 TABLET ORAL DAILY
Status: DISCONTINUED | OUTPATIENT
Start: 2025-05-29 | End: 2025-05-28

## 2025-05-28 RX ORDER — QUETIAPINE FUMARATE 100 MG/1
200 TABLET, FILM COATED ORAL
Status: DISCONTINUED | OUTPATIENT
Start: 2025-05-29 | End: 2025-05-30 | Stop reason: HOSPADM

## 2025-05-28 RX ORDER — MAGNESIUM CARB/ALUMINUM HYDROX 105-160MG
296 TABLET,CHEWABLE ORAL ONCE
Status: COMPLETED | OUTPATIENT
Start: 2025-05-28 | End: 2025-05-28

## 2025-05-28 RX ORDER — LACOSAMIDE 100 MG/1
200 TABLET ORAL
Status: DISCONTINUED | OUTPATIENT
Start: 2025-05-28 | End: 2025-05-30 | Stop reason: HOSPADM

## 2025-05-28 RX ORDER — HALOPERIDOL 5 MG/ML
5 INJECTION INTRAMUSCULAR EVERY 6 HOURS PRN
Status: DISCONTINUED | OUTPATIENT
Start: 2025-05-28 | End: 2025-05-30 | Stop reason: HOSPADM

## 2025-05-28 RX ORDER — MAGNESIUM HYDROXIDE/ALUMINUM HYDROXICE/SIMETHICONE 120; 1200; 1200 MG/30ML; MG/30ML; MG/30ML
30 SUSPENSION ORAL EVERY 6 HOURS PRN
Status: DISCONTINUED | OUTPATIENT
Start: 2025-05-28 | End: 2025-05-30 | Stop reason: HOSPADM

## 2025-05-28 RX ORDER — FOLIC ACID 1 MG/1
1 TABLET ORAL DAILY
Status: DISCONTINUED | OUTPATIENT
Start: 2025-05-29 | End: 2025-05-30 | Stop reason: HOSPADM

## 2025-05-28 RX ORDER — GABAPENTIN 300 MG/1
300 CAPSULE ORAL 3 TIMES DAILY
Status: DISCONTINUED | OUTPATIENT
Start: 2025-05-28 | End: 2025-05-30 | Stop reason: HOSPADM

## 2025-05-28 RX ORDER — LEVOTHYROXINE SODIUM 150 UG/1
150 TABLET ORAL
Status: DISCONTINUED | OUTPATIENT
Start: 2025-05-29 | End: 2025-05-30 | Stop reason: HOSPADM

## 2025-05-28 RX ORDER — ACETAMINOPHEN 325 MG/1
650 TABLET ORAL EVERY 6 HOURS PRN
Status: DISCONTINUED | OUTPATIENT
Start: 2025-05-28 | End: 2025-05-30 | Stop reason: HOSPADM

## 2025-05-28 RX ADMIN — ZONISAMIDE 300 MG: 100 CAPSULE ORAL at 21:57

## 2025-05-28 RX ADMIN — QUETIAPINE 400 MG: 200 TABLET, FILM COATED, EXTENDED RELEASE ORAL at 21:57

## 2025-05-28 RX ADMIN — GABAPENTIN 300 MG: 300 CAPSULE ORAL at 20:48

## 2025-05-28 RX ADMIN — HALOPERIDOL LACTATE 5 MG: 5 INJECTION, SOLUTION INTRAMUSCULAR at 18:16

## 2025-05-28 RX ADMIN — DIVALPROEX SODIUM 1000 MG: 500 TABLET, DELAYED RELEASE ORAL at 21:56

## 2025-05-28 RX ADMIN — LACOSAMIDE 200 MG: 100 TABLET, FILM COATED ORAL at 21:56

## 2025-05-28 RX ADMIN — SODIUM CHLORIDE 1000 ML: 0.9 INJECTION, SOLUTION INTRAVENOUS at 12:01

## 2025-05-28 RX ADMIN — LEVETIRACETAM 1500 MG: 500 TABLET, FILM COATED ORAL at 20:48

## 2025-05-28 RX ADMIN — IOHEXOL 100 ML: 350 INJECTION, SOLUTION INTRAVENOUS at 11:47

## 2025-05-28 RX ADMIN — LORAZEPAM 2 MG: 2 INJECTION INTRAMUSCULAR; INTRAVENOUS at 14:53

## 2025-05-28 RX ADMIN — MAGNESIUM CITRATE 296 ML: 1.75 LIQUID ORAL at 18:54

## 2025-05-28 RX ADMIN — PRAVASTATIN SODIUM 40 MG: 40 TABLET ORAL at 18:53

## 2025-05-28 RX ADMIN — SODIUM CHLORIDE, SODIUM LACTATE, POTASSIUM CHLORIDE, AND CALCIUM CHLORIDE 75 ML/HR: .6; .31; .03; .02 INJECTION, SOLUTION INTRAVENOUS at 18:16

## 2025-05-28 RX ADMIN — HALOPERIDOL LACTATE 5 MG: 5 INJECTION, SOLUTION INTRAMUSCULAR at 14:53

## 2025-05-28 NOTE — ASSESSMENT & PLAN NOTE
History noted, no recent seizure activity  Continue home AEDs:   Depakote 500 mg am, 1000 mg pm  Vimpat 150 mg am, 200 mg pm  Keppra 1500 mg bid  Zonegran 300 mg pm  Seizure precautions

## 2025-05-28 NOTE — ED PROVIDER NOTES
Time reflects when diagnosis was documented in both MDM as applicable and the Disposition within this note       Time User Action Codes Description Comment    5/28/2025  1:23 PM Gayle Hopkins [K62.5] Rectal bleeding     5/28/2025  1:24 PM Humberto Hopkinsimejason Mayer [T18.5XXD] Foreign body of anus or rectum, subsequent encounter     5/28/2025  2:41 PM KellieGayle [R79.89] Elevated lactic acid level           ED Disposition       ED Disposition   Admit    Condition   Stable    Date/Time   Wed May 28, 2025  2:41 PM    Comment   Case was discussed with GLI and SLIM and the patient's admission status was agreed to be Admission Status: observation status to the service of Dr. Barnes .               Assessment & Plan       Medical Decision Making  60 yr old male with pmh of CP, HTN, psychiatric disorder, DM presents with rectal bleeding. Differential diagnoses include  foreign body. diverticulosis, hemorrhoids. Less likely etiologies include angiodysplasia, cancer, IBD. Presentation not consistent with mesenteric ischemia or ischemic colitis, brisk or life threatening upper GIB as patient has no evidence of hemorrhagic shock, melena. Will obtain labs and CT to r/o perforation. Rectal exam with no blood or evidence of retrained FB.     H&H are stable. Lactic is elevated. Will give fluids and repeat. After rectal exam, approx 30 mins later patient started bleeding again and soaked sheets and filled toilet of bright red blood. Repeat lactic decreased but still elevated despite fluids. Message to GI who recommends admission with plan to scope.     Discussed with caregiver who reports he will need something to calm him down. Haldol and Ativan given. Discussed admission with [facility]. Dr. Barnes will be admitting provider. Patient stable for admission.     Amount and/or Complexity of Data Reviewed  Labs: ordered.  Radiology: ordered. Decision-making details documented in ED Course.    Risk  Prescription drug  management.  Decision regarding hospitalization.        ED Course as of 05/28/25 1747   Wed May 28, 2025   1038 Patient bleeding again   1307 CT abdomen pelvis with contrast  IMPRESSION:     1.  No acute abnormality in the abdomen or pelvis. No evidence of pneumoperitoneum or retained rectal foreign body.  2.  Moderate to large colonic stool burden.  3.  Nonobstructing left renal stones.         Medications   insulin lispro (HumALOG/ADMELOG) 100 units/mL subcutaneous injection 1-5 Units (has no administration in time range)   sodium chloride 0.9 % bolus 1,000 mL (0 mL Intravenous Stopped 5/28/25 1341)   iohexol (OMNIPAQUE) 350 MG/ML injection (MULTI-DOSE) 100 mL (100 mL Intravenous Given 5/28/25 1147)   haloperidol lactate (HALDOL) injection 5 mg (5 mg Intravenous Given 5/28/25 1453)   LORazepam (ATIVAN) injection 2 mg (2 mg Intravenous Given 5/28/25 1453)       ED Risk Strat Scores                    No data recorded                            History of Present Illness       Chief Complaint   Patient presents with    Rectal Bleeding     Woke up with blood in his bed and on his buttocks, per caregiver, patients sister gave him a toy windmill and he stuck it up his rectum, per caregiver the plastic windmill was removed and intact.        Past Medical History[1]   Past Surgical History[2]   Family History[3]   Social History[4]   E-Cigarette/Vaping    E-Cigarette Use Never User       E-Cigarette/Vaping Substances    Nicotine No     THC No     CBD No     Flavoring No     Other No     Unknown No       I have reviewed and agree with the history as documented.     60 yr old male with pmh of CP, HTN, psychiatric disorder, DM presents with rectal bleeding. Per care giver, patient was given a windmill that sticks in ground and he put it up his rectum. Care giver reports when she found him this morning there was blood all over his pants as well as his bed. She said the foreign body was intact. Denies any other complaints.            Review of Systems   Constitutional:  Negative for chills and fever.   HENT:  Negative for ear pain and sore throat.    Eyes:  Negative for pain and visual disturbance.   Respiratory:  Negative for cough and shortness of breath.    Cardiovascular:  Negative for chest pain and palpitations.   Gastrointestinal:  Positive for anal bleeding. Negative for abdominal pain and vomiting.   Genitourinary:  Negative for dysuria and hematuria.   Musculoskeletal:  Negative for arthralgias and back pain.   Skin:  Negative for color change and rash.   Neurological:  Negative for seizures and syncope.   All other systems reviewed and are negative.          Objective       ED Triage Vitals   Temperature Pulse Blood Pressure Respirations SpO2 Patient Position - Orthostatic VS   05/28/25 0900 05/28/25 0900 05/28/25 0900 05/28/25 0900 05/28/25 0900 05/28/25 0900   (!) 97.2 °F (36.2 °C) (!) 114 136/61 20 98 % Sitting      Temp Source Heart Rate Source BP Location FiO2 (%) Pain Score    05/28/25 0900 05/28/25 0900 05/28/25 0900 -- 05/28/25 1731    Temporal Monitor Left arm  No Pain      Vitals      Date and Time Temp Pulse SpO2 Resp BP Pain Score FACES Pain Rating User   05/28/25 1731 -- -- -- -- -- No Pain -- RC   05/28/25 1700 98.6 °F (37 °C) -- -- 18 -- -- -- RC   05/28/25 1650 -- 94 96 % -- 138/85 -- -- DII   05/28/25 0900 97.2 °F (36.2 °C) 114 98 % 20 136/61 -- -- GP            Physical Exam  Vitals and nursing note reviewed. Exam conducted with a chaperone present.   Constitutional:       General: He is not in acute distress.     Appearance: He is well-developed.   HENT:      Head: Normocephalic and atraumatic.     Eyes:      Conjunctiva/sclera: Conjunctivae normal.       Cardiovascular:      Rate and Rhythm: Normal rate and regular rhythm.      Heart sounds: No murmur heard.  Pulmonary:      Effort: Pulmonary effort is normal. No respiratory distress.      Breath sounds: Normal breath sounds.   Abdominal:      Palpations:  Abdomen is soft.      Tenderness: There is no abdominal tenderness.   Genitourinary:     Rectum: No tenderness, anal fissure, external hemorrhoid or internal hemorrhoid. Normal anal tone.      Comments: No evidence of retained foreign body.     Musculoskeletal:         General: No swelling.      Cervical back: Neck supple.     Skin:     General: Skin is warm and dry.      Capillary Refill: Capillary refill takes less than 2 seconds.     Neurological:      Mental Status: He is alert.     Psychiatric:         Mood and Affect: Mood normal.         Results Reviewed       Procedure Component Value Units Date/Time    Hemoglobin A1c w/EAG Estimation (Prechecked if no A1C within 90 days) [[id number]] Collected: 05/28/25 1004    Lab Status: In process Specimen: Blood from Arm, Right Updated: 05/28/25 1618    Lactic acid 2 Hours [[id number]]  (Abnormal) Collected: 05/28/25 1347    Lab Status: Final result Specimen: Blood from Arm, Left Updated: 05/28/25 1406     LACTIC ACID 2.1 mmol/L     Narrative:      Result may be elevated if tourniquet was used during collection.    Comprehensive metabolic panel [[id number]]  (Abnormal) Collected: 05/28/25 1004    Lab Status: Final result Specimen: Blood from Arm, Right Updated: 05/28/25 1028     Sodium 141 mmol/L      Potassium 4.2 mmol/L      Chloride 109 mmol/L      CO2 21 mmol/L      ANION GAP 11 mmol/L      BUN 23 mg/dL      Creatinine 0.70 mg/dL      Glucose 155 mg/dL      Calcium 8.9 mg/dL      AST 15 U/L      ALT 15 U/L      Alkaline Phosphatase 40 U/L      Total Protein 6.4 g/dL      Albumin 3.9 g/dL      Total Bilirubin 0.26 mg/dL      eGFR 104 ml/min/1.73sq m     Narrative:      National Kid[employer] guidelines for Chronic Kidney Disease (CKD):     Stage 1 with normal or high GFR (GFR > 90 mL/min/1.73 square meters)    Stage 2 Mild CKD (GFR = 60-89 mL/min/1.73 square meters)    Stage 3A Moderate CKD (GFR = 45-59 mL/min/1.73 square meters)    Stage 3B Moderate CKD  (GFR = 30-44 mL/min/1.73 square meters)    Stage 4 Severe CKD (GFR = 15-29 mL/min/1.73 square meters)    Stage 5 End Stage CKD (GFR <15 mL/min/1.73 square meters)  Note: GFR calculation is accurate only with a steady state creatinine    Lactic acid, plasma (w/reflex if result > 2.0) [[id number]]  (Abnormal) Collected: 05/28/25 1004    Lab Status: Final result Specimen: Blood from Arm, Right Updated: 05/28/25 1027     LACTIC ACID 2.9 mmol/L     Narrative:      Result may be elevated if tourniquet was used during collection.    Protime-INR [[id number]]  (Normal) Collected: 05/28/25 1004    Lab Status: Final result Specimen: Blood from Arm, Right Updated: 05/28/25 1024     Protime 12.9 seconds      INR 0.90    Narrative:      INR Therapeutic Range    Indication                                             INR Range      Atrial Fibrillation                                               2.0-3.0  Hypercoagulable State                                    2.0.2.3  Left Ventricular Asist Device                            2.0-3.0  Mechanical Heart Valve                                  -    Aortic(with afib, MI, embolism, HF, LA enlargement,    and/or coagulopathy)                                     2.0-3.0 (2.5-3.5)     Mitral                                                             2.5-3.5  Prosthetic/Bioprosthetic Heart Valve               2.0-3.0  Venous thromboembolism (VTE: VT, PE        2.0-3.0    APTT [[id number]]  (Normal) Collected: 05/28/25 1004    Lab Status: Final result Specimen: Blood from Arm, Right Updated: 05/28/25 1024     PTT 28 seconds     CBC and differential [[id number]]  (Abnormal) Collected: 05/28/25 1004    Lab Status: Final result Specimen: Blood from Arm, Right Updated: 05/28/25 1015     WBC 8.36 Thousand/uL      RBC 4.00 Million/uL      Hemoglobin 12.8 g/dL      Hematocrit 38.6 %      MCV 97 fL      MCH 32.0 pg      MCHC 33.2 g/dL      RDW 13.5 %      MPV 11.2 fL      Platelets 132 Thousands/uL       nRBC 0 /100 WBCs      Segmented % 42 %      Immature Grans % 1 %      Lymphocytes % 40 %      Monocytes % 14 %      Eosinophils Relative 2 %      Basophils Relative 1 %      Absolute Neutrophils 3.56 Thousands/µL      Absolute Immature Grans 0.05 Thousand/uL      Absolute Lymphocytes 3.34 Thousands/µL      Absolute Monocytes 1.19 Thousand/µL      Eosinophils Absolute 0.17 Thousand/µL      Basophils Absolute 0.05 Thousands/µL             CT abdomen pelvis with contrast   Final Interpretation by Daillo Sims MD (05/28 4721)      1.  No acute abnormality in the abdomen or pelvis. No evidence of pneumoperitoneum or retained rectal foreign body.   2.  Moderate to large colonic stool burden.   3.  Nonobstructing left renal stones.         Workstation performed: [id number]             Procedures    ED Medication and Procedure Management   Prior to Admission Medications   Prescriptions Last Dose Informant Patient Reported? Taking?   Ascorbic Acid (Vitamin C ER) 500 MG CPCR 5/27/2025 Morning  No Yes   Sig: TAKE 2 TABLETS BY MOUTH DAILY DX IRON DEFICIENCY ANEMIAS (=1000MG)   Depakote 500 MG DR tablet 5/27/2025 Evening  No Yes   Sig: Take 1 tab in the morning and 2 tabs at night. Brand necessary   Disposable Gloves (Safe-Sense Glove-Blk-Nitrl-L) MISC  Care Giver No No   Sig: Use 1 each 3 (three) times a day as needed (care taker assisting with soiled briefs)   Incontinence Supply Disposable (Briefs Overnight Medium) MISC  Care Giver No No   Sig: Use in the morning   Multiple Vitamin (Daily-Reina) TABS 5/27/2025 Morning Care Giver No Yes   Sig: Take 1 tablet by mouth daily Take at 8 AM   OneTouch Delica Lancets 33G MISC  Care Giver No No   Sig: Check blood sugars twice daily. Please substitute with appropriate alternative as covered by patient's insurance. Dx: E11.65   QUEtiapine (SEROquel XR) 400 mg 24 hr tablet 5/27/2025 Evening Care Giver No Yes   Sig: Take 1 tablet (400 mg total) by mouth daily at bedtime   QUEtiapine  (SEROquel) 200 mg tablet 5/27/2025 Noon Care Giver No Yes   Sig: Take 1 tablet (200 mg total) by mouth 2 (two) times a day before breakfast and lunch   Saccharomyces boulardii (Probiotic) 250 MG CAPS 5/27/2025 Evening  No Yes   Sig: GIVE 1 CAPSULE BY MOUTH TWICE DAILY FOR URINARY TRACT INFECTION   cyanocobalamin (VITAMIN B-12) 500 MCG tablet  Care Giver No No   Sig: Take 1 tablet (500 mcg total) by mouth daily   docusate sodium (COLACE) 100 mg capsule 5/27/2025 Evening Care Giver No Yes   Sig: Take 1 capsule (100 mg total) by mouth 2 (two) times a day   folic acid (FOLVITE) 1 mg tablet 5/27/2025 Morning Care Giver No Yes   Sig: GIVE 1 TABLET BY MOUTH DAILY DX METABOLIC ENCEPHALOPATHY   gabapentin (NEURONTIN) 300 mg capsule 5/27/2025 Evening Care Giver Yes Yes   Sig: Take 300 mg by mouth in the morning and 300 mg in the evening and 300 mg before bedtime.   glucose blood (OneTouch Verio) test strip  Care Giver No No   Sig: Check blood sugars once daily. Please substitute with appropriate alternative as covered by patient's insurance. Dx: E11.65   glucose blood (OneTouch Verio) test strip  Care Giver No No   Sig: Check blood sugars twice daily. Please substitute with appropriate alternative as covered by patient's insurance. Dx: E11.65   lacosamide (VIMPAT) 150 mg tablet 5/27/2025 Morning  No Yes   Sig: Take 1 tab (150 mg) in the morning.   lacosamide (VIMPAT) 200 mg tablet 5/27/2025 Evening  No Yes   Sig: Take 1 tab (200 mg) at night.   levETIRAcetam (KEPPRA) 750 mg tablet 5/27/2025 Evening  No Yes   Sig: Give 2 tabs (1500 mg) by mouth twice a day   levothyroxine 150 mcg tablet 5/27/2025 Morning  No Yes   Sig: GIVE 1 TABLET BY MOUTH IN THE MORNING DX HYPOTHYROIDISM (DR HENDRICKSON)   lisinopril (ZESTRIL) 5 mg tablet 5/27/2025 Morning Care Giver No Yes   Sig: Take 1 tablet (5 mg total) by mouth daily   loratadine (CLARITIN) 10 mg tablet 5/27/2025 Morning Care Giver No Yes   Sig: Take 1 tablet (10 mg total) by mouth daily    magnesium Oxide (MAG-OX) 400 mg TABS  Care Giver No No   Sig: Take 1 tablet (400 mg total) by mouth 2 (two) times a day   metFORMIN (GLUCOPHAGE) 1000 MG tablet 5/27/2025 Evening Care Giver No Yes   Sig: GIVE 1 TABLET BY MOUTH TWICE DAILY WITH MEALS FOR DIABETES   methenamine hippurate (HIPREX) 1 g tablet 5/27/2025 Evening  No Yes   Sig: GIVE 1 TABLET BY MOUTH TWICE DAILY WITH A MEAL URINARY TRACT INFECTION   polyethylene glycol (MIRALAX) 17 g packet   No No   Sig: Take 17 g by mouth daily for 14 days   senna-docusate sodium (SENOKOT S) 8.6-50 mg per tablet  Care Giver No No   Sig: Take 2 tablets by mouth 2 (two) times a day   simvastatin (ZOCOR) 40 mg tablet 5/27/2025 Evening Care Giver No Yes   Sig: GIVE 1 TABLET BY MOUTH AT BEDTIME FOR CHOLESTEROL   zonisamide (ZONEGRAN) 100 mg capsule 5/27/2025 Evening  No Yes   Sig: Take 3 capsules (300 mg total) by mouth daily at bedtime      Facility-Administered Medications: None     Current Discharge Medication List        CONTINUE these medications which have NOT CHANGED    Details   Ascorbic Acid (Vitamin C ER) 500 MG CPCR TAKE 2 TABLETS BY MOUTH DAILY DX IRON DEFICIENCY ANEMIAS (=1000MG)  Qty: 56 capsule, Refills: 5    Associated Diagnoses: Iron deficiency anemia secondary to inadequate dietary iron intake      Depakote 500 MG DR tablet Take 1 tab in the morning and 2 tabs at night. Brand necessary  Qty: 90 tablet, Refills: 11    Associated Diagnoses: Generalized convulsive epilepsy (HCC)      docusate sodium (COLACE) 100 mg capsule Take 1 capsule (100 mg total) by mouth 2 (two) times a day  Qty: 180 capsule, Refills: 1    Associated Diagnoses: Constipation, unspecified constipation type      folic acid (FOLVITE) 1 mg tablet GIVE 1 TABLET BY MOUTH DAILY DX METABOLIC ENCEPHALOPATHY  Qty: 25 tablet, Refills: 5    Associated Diagnoses: Metabolic encephalopathy      gabapentin (NEURONTIN) 300 mg capsule Take 300 mg by mouth in the morning and 300 mg in the evening and 300  mg before bedtime.      !! lacosamide (VIMPAT) 150 mg tablet Take 1 tab (150 mg) in the morning.  Qty: 30 tablet, Refills: 5    Associated Diagnoses: Generalized convulsive epilepsy (HCC)      !! lacosamide (VIMPAT) 200 mg tablet Take 1 tab (200 mg) at night.  Qty: 30 tablet, Refills: 5    Associated Diagnoses: Generalized convulsive epilepsy (HCC)      levETIRAcetam (KEPPRA) 750 mg tablet Give 2 tabs (1500 mg) by mouth twice a day  Qty: 120 tablet, Refills: 11    Associated Diagnoses: Generalized convulsive epilepsy (HCC)      levothyroxine 150 mcg tablet GIVE 1 TABLET BY MOUTH IN THE MORNING DX HYPOTHYROIDISM (DR HENDRICKSON)  Qty: 28 tablet, Refills: 5    Associated Diagnoses: Hypothyroidism due to Hashimoto's thyroiditis      lisinopril (ZESTRIL) 5 mg tablet Take 1 tablet (5 mg total) by mouth daily  Qty: 30 tablet, Refills: 0    Associated Diagnoses: Metabolic encephalopathy      loratadine (CLARITIN) 10 mg tablet Take 1 tablet (10 mg total) by mouth daily  Qty: 90 tablet, Refills: 3    Associated Diagnoses: Environmental and seasonal allergies      metFORMIN (GLUCOPHAGE) 1000 MG tablet GIVE 1 TABLET BY MOUTH TWICE DAILY WITH MEALS FOR DIABETES  Qty: 60 tablet, Refills: 0    Associated Diagnoses: Diabetes mellitus due to underlying condition with hyperosmolarity without coma, without long-term current use of insulin ([facility])      methenamine hippurate (HIPREX) 1 g tablet GIVE 1 TABLET BY MOUTH TWICE DAILY WITH A MEAL URINARY TRACT INFECTION  Qty: 60 tablet, Refills: 1    Associated Diagnoses: Recurrent UTI      Multiple Vitamin (Daily-Reina) TABS Take 1 tablet by mouth daily Take at 8 AM  Qty: 90 tablet, Refills: 11    Associated Diagnoses: Diabetes mellitus due to underlying condition with hyperosmolarity without coma, without long-term current use of insulin ([facility]); Hyperlipidemia, unspecified hyperlipidemia type; Seizure (HCC); Neuropathy; Hypertension, unspecified type      QUEtiapine (SEROquel XR) 400 mg 24 hr  tablet Take 1 tablet (400 mg total) by mouth daily at bedtime    Associated Diagnoses: Behavior concern in adult      QUEtiapine (SEROquel) 200 mg tablet Take 1 tablet (200 mg total) by mouth 2 (two) times a day before breakfast and lunch    Associated Diagnoses: Behavior concern in adult      Saccharomyces boulardii (Probiotic) 250 MG CAPS GIVE 1 CAPSULE BY MOUTH TWICE DAILY FOR URINARY TRACT INFECTION  Qty: 56 capsule, Refills: 2    Associated Diagnoses: Recurrent UTI      simvastatin (ZOCOR) 40 mg tablet GIVE 1 TABLET BY MOUTH AT BEDTIME FOR CHOLESTEROL  Qty: 30 tablet, Refills: 0    Associated Diagnoses: Hyperlipidemia, unspecified hyperlipidemia type      zonisamide (ZONEGRAN) 100 mg capsule Take 3 capsules (300 mg total) by mouth daily at bedtime  Qty: 90 capsule, Refills: 11    Associated Diagnoses: Generalized convulsive epilepsy (HCC)      cyanocobalamin (VITAMIN B-12) 500 MCG tablet Take 1 tablet (500 mcg total) by mouth daily  Qty: 30 tablet, Refills: 0    Associated Diagnoses: Metabolic encephalopathy      Disposable Gloves (Safe-Sense Glove-Blk-Nitrl-L) MISC Use 1 each 3 (three) times a day as needed (care taker assisting with soiled briefs)  Qty: 100 each, Refills: 1    Associated Diagnoses: Behavior concern in adult; Urinary incontinence, unspecified type      !! glucose blood (OneTouch Verio) test strip Check blood sugars once daily. Please substitute with appropriate alternative as covered by patient's insurance. Dx: E11.65  Qty: 100 each, Refills: 3    Comments: Please substitute with appropriate alternative as covered by patient's insurance  Associated Diagnoses: Type 2 diabetes mellitus with diabetic neuropathy, without long-term current use of insulin (HCC)      !! glucose blood (OneTouch Verio) test strip Check blood sugars twice daily. Please substitute with appropriate alternative as covered by patient's insurance. Dx: E11.65  Qty: 200 each, Refills: 3    Comments: Please substitute with  appropriate alternative as covered by patient's insurance  Associated Diagnoses: Type 2 diabetes mellitus with diabetic neuropathy, without long-term current use of insulin ([facility])      Incontinence Supply Disposable (Briefs Overnight Medium) MISC Use in the morning  Qty: 20 each, Refills: 11    Associated Diagnoses: Urinary incontinence, unspecified type      magnesium Oxide (MAG-OX) 400 mg TABS Take 1 tablet (400 mg total) by mouth 2 (two) times a day  Qty: 60 tablet, Refills: 0    Associated Diagnoses: Metabolic encephalopathy      OneTouch Delica Lancets 33G MISC Check blood sugars twice daily. Please substitute with appropriate alternative as covered by patient's insurance. Dx: E11.65  Qty: 200 each, Refills: 3    Comments: Please substitute with appropriate alternative as covered by patient's insurance  Associated Diagnoses: Type 2 diabetes mellitus with diabetic neuropathy, without long-term current use of insulin ([facility])      polyethylene glycol (MIRALAX) 17 g packet Take 17 g by mouth daily for 14 days  Qty: 238 g, Refills: 0    Associated Diagnoses: Metabolic encephalopathy      senna-docusate sodium (SENOKOT S) 8.6-50 mg per tablet Take 2 tablets by mouth 2 (two) times a day  Qty: 120 tablet, Refills: 0    Associated Diagnoses: Metabolic encephalopathy       !! - Potential duplicate medications found. Please discuss with provider.        No discharge procedures on file.  ED SEPSIS DOCUMENTATION   Time reflects when diagnosis was documented in both MDM as applicable and the Disposition within this note       Time User Action Codes Description Comment    5/28/2025  1:23 PM Gayle Hopkins Add [K62.5] Rectal bleeding     5/28/2025  1:24 PM Gayle Hopkins Add [T18.5XXD] Foreign body of anus or rectum, subsequent encounter     5/28/2025  2:41 PM Gayle Hopkins [R79.89] Elevated lactic acid level                      [1]   Past Medical History:  Diagnosis Date    ADRIANA (acute kidney injury) ([facility])  09/24/2019    Bacteremia due to Gram-positive bacteria 09/07/2021    Buttock wound, left, subsequent encounter 11/05/2021    COVID-19     CP (cerebral palsy) ([facility])     Depression     Diabetes ([facility])     Diabetic eye exam ([facility]) 10/06/2024    Disease of thyroid gland     Encounter for diabetic foot exam ([facility]) 10/06/2024    Encounter for immunization 10/06/2024    Gait disorder     Gluteal abscess 09/06/2021    Hospital discharge follow-up 09/06/2024    Hypertension     Kidney failure     Kidney stones     Medicare annual wellness visit, subsequent 10/06/2024    Moderate protein-calorie malnutrition ([facility]) 12/22/2021    Osteoporosis     Pressure injury of left buttock, stage 4 ([facility]) 03/09/2022    Psychiatric disorder     Scoliosis     Screening for diabetes mellitus 09/06/2024    Screening for malignant neoplasm of prostate 05/12/2025    Screening for prostate cancer 09/06/2024    Screening for thyroid disorder 09/06/2024    Seizures ([facility])     Sepsis ([facility]) 09/25/2019    Thyroid disease     UTI (urinary tract infection)    [2]   Past Surgical History:  Procedure Laterality Date    APPENDECTOMY      IR CHEST TUBE PLACEMENT  6/14/2024    IR PICC PLACEMENT SINGLE LUMEN  9/13/2021    IR PICC PLACEMENT SINGLE LUMEN  9/16/2021   [3] No family history on file.  [4]   Social History  Tobacco Use    Smoking status: Never     Passive exposure: Never    Smokeless tobacco: Never   Vaping Use    Vaping status: Never Used   Substance Use Topics    Alcohol use: Never    Drug use: No        Gayle Hopkins PA-C  05/28/25 1747

## 2025-05-28 NOTE — ASSESSMENT & PLAN NOTE
Monitor mood and behaviors, required haldol in ER for agitation  Continue Seroquel 200 mg am, mid-day + Seroquel  mg pm   Normal for race

## 2025-05-28 NOTE — DISCHARGE INSTRUCTIONS
You have been evaluated in the Emergency Department today for rectal bleeding. Your evaluation, including CT, do not suggest that your symptoms require emergent intervention.     Please follow up with your primary care physician within two days.    Return to the Emergency Department if you experience abdominal pain, nausea, vomiting, bleeding does not resolve, or any other concerning symptoms.

## 2025-05-28 NOTE — PLAN OF CARE
Problem: PAIN - ADULT  Goal: Verbalizes/displays adequate comfort level or baseline comfort level  Description: Interventions:  - Encourage patient to monitor pain and request assistance  - Assess pain using appropriate pain scale  - Administer analgesics as ordered based on type and severity of pain and evaluate response  - Implement non-pharmacological measures as appropriate and evaluate response  - Consider cultural and social influences on pain and pain management  - Notify physician/advanced practitioner if interventions unsuccessful or patient reports new pain  - Educate patient/family on pain management process including their role and importance of  reporting pain   - Provide non-pharmacologic/complimentary pain relief interventions  Outcome: Progressing     Problem: INFECTION - ADULT  Goal: Absence or prevention of progression during hospitalization  Description: INTERVENTIONS:  - Assess and monitor for signs and symptoms of infection  - Monitor lab/diagnostic results  - Monitor all insertion sites, i.e. indwelling lines, tubes, and drains  - Monitor endotracheal if appropriate and nasal secretions for changes in amount and color  - [place] appropriate cooling/warming therapies per order  - Administer medications as ordered  - Instruct and encourage patient and family to use good hand hygiene technique  - Identify and instruct in appropriate isolation precautions for identified infection/condition  Outcome: Progressing  Goal: Absence of fever/infection during neutropenic period  Description: INTERVENTIONS:  - Monitor WBC  - Perform strict hand hygiene  - Limit to healthy visitors only  - No plants, dried, fresh or silk flowers with narayan in patient room  Outcome: Progressing     Problem: SAFETY ADULT  Goal: Patient will remain free of falls  Description: INTERVENTIONS:  - Educate patient/family on patient safety including physical limitations  - Instruct patient to call for assistance with activity   -  Consider consulting OT/PT to assist with strengthening/mobility based on AM PAC & JH-HLM score  - Consult OT/PT to assist with strengthening/mobility   - Keep Call bell within reach  - Keep bed low and locked with side rails adjusted as appropriate  - Keep care items and personal belongings within reach  - Initiate and maintain comfort rounds  - Make Fall Risk Sign visible to staff  - Offer Toileting every 2 Hours, in advance of need  - Initiate/Maintain bed/chair alarm  - Obtain necessary fall risk management equipment  - Apply yellow socks and bracelet for high fall risk patients  - Consider moving patient to room near nurses station  Outcome: Progressing  Goal: Maintain or return to baseline ADL function  Description: INTERVENTIONS:  -  Assess patient's ability to carry out ADLs; assess patient's baseline for ADL function and identify physical deficits which impact ability to perform ADLs (bathing, care of mouth/teeth, toileting, grooming, dressing, etc.)  - Assess/evaluate cause of self-care deficits   - Assess range of motion  - Assess patient's mobility; develop plan if impaired  - Assess patient's need for assistive devices and provide as appropriate  - Encourage maximum independence but intervene and supervise when necessary  - Involve family in performance of ADLs  - Assess for home care needs following discharge   - Consider OT consult to assist with ADL evaluation and planning for discharge  - Provide patient education as appropriate  - Monitor functional capacity and physical performance, use of AM PAC & JH-HLM   - Monitor gait, balance and fatigue with ambulation    Outcome: Progressing  Goal: Maintains/Returns to pre admission functional level  Description: INTERVENTIONS:  - Perform AM-PAC 6 Click Basic Mobility/ Daily Activity assessment daily.  - Set and communicate daily mobility goal to care team and patient/family/caregiver.   - Collaborate with rehabilitation services on mobility goals if  consulted  - Perform Range of Motion 3 times a day.  - Reposition patient every 2 hours.  - Dangle patient 3 times a day  - Stand patient 3 times a day  - Ambulate patient 3 times a day  - Out of bed to chair 3 times a day   - Out of bed for meals 3 times a day  - Out of bed for toileting  - Record patient progress and toleration of activity level   Outcome: Progressing     Problem: DISCHARGE PLANNING  Goal: Discharge to home or other facility with appropriate resources  Description: INTERVENTIONS:  - Identify barriers to discharge w/patient and caregiver  - Arrange for needed discharge resources and transportation as appropriate  - Identify discharge learning needs (meds, wound care, etc.)  - Arrange for interpretive services to assist at discharge as needed  - Refer to Case Management Department for coordinating discharge planning if the patient needs post-hospital services based on physician/advanced practitioner order or complex needs related to functional status, cognitive ability, or social support system  Outcome: Progressing     Problem: Knowledge Deficit  Goal: Patient/family/caregiver demonstrates understanding of disease process, treatment plan, medications, and discharge instructions  Description: Complete learning assessment and assess knowledge base.  Interventions:  - Provide teaching at level of understanding  - Provide teaching via preferred learning methods  Outcome: Progressing     Problem: Prexisting or High Potential for Compromised Skin Integrity  Goal: Skin integrity is maintained or improved  Description: INTERVENTIONS:  - Identify patients at risk for skin breakdown  - Assess and monitor skin integrity including under and around medical devices   - Assess and monitor nutrition and hydration status  - Monitor labs  - Assess for incontinence   - Turn and reposition patient  - Assist with mobility/ambulation  - Relieve pressure over monet prominences   - Avoid friction and shearing  - Provide  appropriate hygiene as needed including keeping skin clean and dry  - Evaluate need for skin moisturizer/barrier cream  - Collaborate with interdisciplinary team  - Patient/family teaching  - Consider wound care consult    Assess:  - Review Nolan scale daily  - Clean and moisturize skin  - Inspect skin when repositioning, toileting, and assisting with ADLS  - Assess under medical devices  - Assess extremities for adequate circulation and sensation     Bed Management:  - Have minimal linens on bed & keep smooth, unwrinkled  - Change linens as needed when moist or perspiring  - Avoid sitting or lying in one position  - Toileting:  - Offer bedside commode  - Assess for incontinence  - Use incontinent care products after each incontinent episode    Activity:  - Mobilize patient 3 times a day  - Encourage activity and walks on unit  - Encourage or provide ROM exercises   - Turn and reposition patient every 2 Hours  - Use appropriate equipment to lift or move patient in bed  - Instruct/ Assist with weight shifting  - Consider limitation of chair time    Skin Care:  - Avoid use of baby powder, tape, friction and shearing, hot water or constrictive clothing  - Relieve pressure over bony prominences  - Do not massage red bony areas    Next Steps:  - Teach patient strategies to minimize risks  - Consider consults to  interdisciplinary teams  Outcome: Progressing

## 2025-05-28 NOTE — HOSPITAL COURSE
Jairon Oconnell is a 60 y.o. male with a PMH of cerepral palsy, depression, diabetes, hypertension, nephrolithiasis, seizures, hypothyroidism, psychiatric disorder, intellectual disability, and limited verbal communication who presents with rectal bleeding secondary to insertion of foreign body into rectum. Per caregiver, object was removed intact, however patient had bleeding and thus evaluation was sought. ER did speak to GI who is recommending observation for inpatient scope to evaluate for any trauma requiring intervention, though CT scan does not identify bowel perforation and notes moderate to large colonic stool burden and non-obstructing left nephrolithiasis.  Patient was then evaluated with conservative treatment and thankfully did not require any kind of endoscopic procedure.  He had 1 episode of bright red blood per rectum however this was a singular occurrence.  Patient's hemoglobin was monitored and remained stable between 9 and 10.  At this time patient is medically stable for discharge and caregivers and patient are agreeable for plan to discharge.

## 2025-05-28 NOTE — ASSESSMENT & PLAN NOTE
Chronic, last TSH visible is from 20204 and elevated with low FT4  Update TFTs  Continue levothyroxine 150 mcg daily

## 2025-05-28 NOTE — ASSESSMENT & PLAN NOTE
"Lab Results   Component Value Date    HGBA1C 6.4 (H) 08/20/2024       No results for input(s): \"POCGLU\" in the last 72 hours.    Blood Sugar Average: Last 72 hrs:      "

## 2025-05-28 NOTE — ASSESSMENT & PLAN NOTE
Chronic issue, on miralax and colace at baseline   CT a/p :moderate to large colonic stool burden  Will give dose of Magnesium Citrate and monitor for effect  Dulcolax AR if needed vs enema

## 2025-05-28 NOTE — ASSESSMENT & PLAN NOTE
Presents with report of BRBPR following insertion of a foreign body (toy windmill). Foreign body removed however given the trauma, GI is recommending observation with plans for flexible sigmoidoscopy if continued bleeding   Serial abdominal exams  Serial h/h

## 2025-05-28 NOTE — H&P
H&P - Hospitalist   Name: Jairon Oconnell 60 y.o. male I MRN: [id number]  Unit/Bed#: ED 25 I Date of Admission: 5/28/2025   Date of Service: 5/28/2025 I Hospital Day: 0     Assessment & Plan  Rectal bleeding  Presents with report of BRBPR following insertion of a foreign body (toy windmill). Foreign body removed however given the trauma, GI is recommending observation with plans for flexible sigmoidoscopy if continued bleeding   Serial abdominal exams  Serial h/h  Rectal foreign body, sequela  Monitor patient, maintain safe enviorment   Type 2 diabetes mellitus with diabetic neuropathy, without long-term current use of insulin (HCC)  Lab Results   Component Value Date    HGBA1C 6.4 (H) 08/20/2024   Blood Sugar Average: Last 72 hrs   Well-controlled a/e/b A1c 6.4%, will update this admission  Hold home med [metformin]   Humalog SSI # 2   [facility] diet   Blood glucose monitoring q6h while NPO   Hypoglycemia protocol  Ataxic cerebral palsy (HCC)  History noted, fall precautions  Generalized convulsive epilepsy (HCC)  History noted, no recent seizure activity  Continue home AEDs:   Depakote 500 mg am, 1000 mg pm  Vimpat 150 mg am, 200 mg pm  Keppra 1500 mg bid  Zonegran 300 mg pm  Seizure precautions  Primary hypertension  Chronic, Monitor VS per unit protocol   Continue home dose lisinopril 5 mg daily   Acquired hypothyroidism  Chronic, last TSH visible is from 20204 and elevated with low FT4  Update TFTs  Continue levothyroxine 150 mcg daily  Slow transit constipation  Chronic issue, on miralax and colace at baseline   CT a/p :moderate to large colonic stool burden  Will give dose of Magnesium Citrate and monitor for effect  Dulcolax OH if needed vs enema  Behavior concern in adult  Monitor mood and behaviors, required haldol in ER for agitation  Continue Seroquel 200 mg am, mid-day + Seroquel  mg pm      VTE Pharmacologic Prophylaxis: VTE Score: 1 Low Risk (Score 0-2) - Encourage Ambulation.  Code Status: Prior FULL  CODE   Discussion with family: Updated  (caregiver ) at bedside.    Anticipated Length of Stay: Patient will be admitted on an observation basis with an anticipated length of stay of less than 2 midnights secondary to rectal bleeding secondary to foreign body; will need urgent scope and observation.    History of Present Illness   Chief Complaint: Rectal Bleeding (Woke up with blood in his bed and on his buttocks, per caregiver, patients sister gave him a toy windmill and he stuck it up his rectum, per caregiver the plastic windmill was removed and intact. )     Jairon Oconnell is a 60 y.o. male with a PMH of cerepral palsy, depression, diabetes, hypertension, nephrolithiasis, seizures, hypothyroidism, psychiatric disorder, intellectual disability, and limited verbal communication who presents with rectal bleeding secondary to insertion of foreign body into rectum. Per caregiver, object was removed intact, however patient had bleeding and thus evaluation was sought. ER did speak to GI who is recommending observation for inpatient scope to evaluate for any trauma requiring intervention, though CT scan does not identify bowel perforation and notes moderate to large colonic stool burden and non-obstructing left nephrolithiasis.          Review of Systems    Historical Information   Past Medical History[1]  Past Surgical History[2]  Social History[3]  E-Cigarette/Vaping    E-Cigarette Use Never User      E-Cigarette/Vaping Substances    Nicotine No     THC No     CBD No     Flavoring No     Other No     Unknown No      Family History[4]  Social History:  Marital Status: Single   Occupation: n/a  Patient Pre-hospital Living Situation: Home  Patient Pre-hospital Level of Mobility: walks  Patient Pre-hospital Diet Restrictions:     Meds/Allergies   I have reviewed home medications with patient family member. (Caregiver, Betty)  Prior to Admission medications    Medication Sig Start Date End Date Taking?  Authorizing Provider   Ascorbic Acid (Vitamin C ER) 500 MG CPCR TAKE 2 TABLETS BY MOUTH DAILY DX IRON DEFICIENCY ANEMIAS (=1000MG) 5/14/25   HUNTER Rose   cyanocobalamin (VITAMIN B-12) 500 MCG tablet Take 1 tablet (500 mcg total) by mouth daily 6/24/24 2/26/25  HUNTER Vega   Depakote 500 MG DR tablet Take 1 tab in the morning and 2 tabs at night. Brand necessary 2/26/25   Keyur Banerjee MD   Disposable Gloves (Safe-Sense Glove-Blk-Nitrl-L) MISC Use 1 each 3 (three) times a day as needed (care taker assisting with soiled briefs) 7/26/23   HUNTER Brown   docusate sodium (COLACE) 100 mg capsule Take 1 capsule (100 mg total) by mouth 2 (two) times a day 5/29/24   HUNTER Brown   folic acid (FOLVITE) 1 mg tablet GIVE 1 TABLET BY MOUTH DAILY DX METABOLIC ENCEPHALOPATHY 8/18/24   HUNTER Brown   gabapentin (NEURONTIN) 300 mg capsule Take 300 mg by mouth 3 (three) times a day    Historical Provider, MD   glucose blood (OneTouch Verio) test strip Check blood sugars once daily. Please substitute with appropriate alternative as covered by patient's insurance. Dx: E11.65 5/22/24   Calvin Hendrickson,    glucose blood (OneTouch Verio) test strip Check blood sugars twice daily. Please substitute with appropriate alternative as covered by patient's insurance. Dx: E11.65 8/1/24   HUNTER Brown   Incontinence Supply Disposable (Briefs Overnight Medium) MISC Use in the morning 7/26/23   HUNTER Brown   lacosamide (VIMPAT) 150 mg tablet Take 1 tab (150 mg) in the morning. 2/26/25   Keyur Banerjee MD   lacosamide (VIMPAT) 200 mg tablet Take 1 tab (200 mg) at night. 2/26/25   Keyur Banerjee MD   levETIRAcetam (KEPPRA) 750 mg tablet Give 2 tabs (1500 mg) by mouth twice a day 2/26/25   Keyur Banerjee MD   levothyroxine 150 mcg tablet GIVE 1 TABLET BY MOUTH IN THE MORNING DX HYPOTHYROIDISM (DR HENDRICKSON) 3/25/25   Calvin Hendrickson DO   lisinopril (ZESTRIL) 5 mg tablet Take 1 tablet (5 mg  total) by mouth daily 6/21/24   Amol Patel MD   loratadine (CLARITIN) 10 mg tablet Take 1 tablet (10 mg total) by mouth daily 4/8/24   HUNTER Brown   magnesium Oxide (MAG-OX) 400 mg TABS Take 1 tablet (400 mg total) by mouth 2 (two) times a day 6/24/24 2/26/25  HUNTER Vega   metFORMIN (GLUCOPHAGE) 1000 MG tablet GIVE 1 TABLET BY MOUTH TWICE DAILY WITH MEALS FOR DIABETES 8/12/24   Calvin Yost DO   methenamine hippurate (HIPREX) 1 g tablet GIVE 1 TABLET BY MOUTH TWICE DAILY WITH A MEAL URINARY TRACT INFECTION 4/22/25   Shirlene Jovel PA-C   Multiple Vitamin (Daily-Reina) TABS Take 1 tablet by mouth daily Take at 8 AM 4/3/23   HUNTER Brown   OneTouch Delica Lancets 33G MISC Check blood sugars twice daily. Please substitute with appropriate alternative as covered by patient's insurance. Dx: E11.65 9/6/24   HUNTER Rose   polyethylene glycol (MIRALAX) 17 g packet Take 17 g by mouth daily for 14 days 4/21/25 5/5/25  HUNTER Rose   QUEtiapine (SEROquel XR) 400 mg 24 hr tablet Take 1 tablet (400 mg total) by mouth daily at bedtime 6/20/24   Amol Patel MD   QUEtiapine (SEROquel) 200 mg tablet Take 1 tablet (200 mg total) by mouth 2 (two) times a day before breakfast and lunch 8/1/24   HUNTER Brown   Saccharomyces boulardii (Probiotic) 250 MG CAPS GIVE 1 CAPSULE BY MOUTH TWICE DAILY FOR URINARY TRACT INFECTION 4/22/25   Shirlene Jovel PA-C   senna-docusate sodium (SENOKOT S) 8.6-50 mg per tablet Take 2 tablets by mouth 2 (two) times a day 6/24/24 2/26/25  HUNTER Vega   simvastatin (ZOCOR) 40 mg tablet GIVE 1 TABLET BY MOUTH AT BEDTIME FOR CHOLESTEROL 8/12/24 2/26/25  Calvin Yost DO   temazepam (RESTORIL) 15 mg capsule Take 1 capsule (15 mg total) by mouth daily at bedtime as needed for sleep for up to 10 days  Patient taking differently: Take 7.5 mg by mouth daily at bedtime Taking 3 tabs of 7.5 mg for a total dose of(22.5 mg)  8/23/24 2/26/25  Bhargav Barnes MD   zonisamide (ZONEGRAN) 100 mg capsule Take 3 capsules (300 mg total) by mouth daily at bedtime 2/26/25   Keyur Banerjee MD     Allergies   Allergen Reactions    Depakote [Valproic Acid] Other (See Comments) and Seizures     Must have brand name Depakote. Off brand Depakote will induce seizures PER caregiver.    Erythromycin Other (See Comments)     Unknown per pt    Penicillins Other (See Comments)     Unknown per pt       Objective :  Temp:  [97.2 °F (36.2 °C)] 97.2 °F (36.2 °C)  HR:  [114] 114  BP: (136)/(61) 136/61  Resp:  [20] 20  SpO2:  [98 %] 98 %  O2 Device: None (Room air)    Physical Exam      Lines/Drains:            Lab Results: I have reviewed the following results:  Results from last 7 days   Lab Units 05/28/25  1004   WBC Thousand/uL 8.36   HEMOGLOBIN g/dL 12.8   HEMATOCRIT % 38.6   PLATELETS Thousands/uL 132*   SEGS PCT % 42*   LYMPHO PCT % 40   MONO PCT % 14*   EOS PCT % 2     Results from last 7 days   Lab Units 05/28/25  1004   SODIUM mmol/L 141   POTASSIUM mmol/L 4.2   CHLORIDE mmol/L 109*   CO2 mmol/L 21   BUN mg/dL 23   CREATININE mg/dL 0.70   ANION GAP mmol/L 11   CALCIUM mg/dL 8.9   ALBUMIN g/dL 3.9   TOTAL BILIRUBIN mg/dL 0.26   ALK PHOS U/L 40   ALT U/L 15   AST U/L 15   GLUCOSE RANDOM mg/dL 155*     Results from last 7 days   Lab Units 05/28/25  1004   INR  0.90         Lab Results   Component Value Date    HGBA1C 6.4 (H) 08/20/2024    HGBA1C 6.7 (H) 06/14/2024    HGBA1C 5.5 11/27/2023     Results from last 7 days   Lab Units 05/28/25  1347 05/28/25  1004   LACTIC ACID mmol/L 2.1* 2.9*       Imaging Results Review: I reviewed radiology reports from this admission including: CT abdomen/pelvis.  Other Study Results Review: EKG was personally reviewed and my interpretation is: Sinus Tachycardia. , , RBBB..    Administrative Statements   I have spent a total time of 55 minutes in caring for this patient on the day of the visit/encounter including  Risks and benefits of tx options, Instructions for management, Patient and family education, Impressions, Counseling / Coordination of care, Documenting in the medical record, Reviewing/placing orders in the medical record (including tests, medications, and/or procedures), Obtaining or reviewing history  , and Communicating with other healthcare professionals .    ** Please Note: This note has been constructed using a voice recognition system. **         [1]   Past Medical History:  Diagnosis Date    ADRIANA (acute kidney injury) ([facility]) 09/24/2019    Bacteremia due to Gram-positive bacteria 09/07/2021    Buttock wound, left, subsequent encounter 11/05/2021    COVID-19     CP (cerebral palsy) ([facility])     Depression     Diabetes ([facility])     Diabetic eye exam ([facility]) 10/06/2024    Disease of thyroid gland     Encounter for diabetic foot exam ([facility]) 10/06/2024    Encounter for immunization 10/06/2024    Gait disorder     Gluteal abscess 09/06/2021    Hospital discharge follow-up 09/06/2024    Hypertension     Kidney failure     Kidney stones     Medicare annual wellness visit, subsequent 10/06/2024    Moderate protein-calorie malnutrition ([facility]) 12/22/2021    Osteoporosis     Pressure injury of left buttock, stage 4 ([facility]) 03/09/2022    Psychiatric disorder     Scoliosis     Screening for diabetes mellitus 09/06/2024    Screening for malignant neoplasm of prostate 05/12/2025    Screening for prostate cancer 09/06/2024    Screening for thyroid disorder 09/06/2024    Seizures ([facility])     Sepsis ([facility]) 09/25/2019    Thyroid disease     UTI (urinary tract infection)    [2]   Past Surgical History:  Procedure Laterality Date    APPENDECTOMY      IR CHEST TUBE PLACEMENT  6/14/2024    IR PICC PLACEMENT SINGLE LUMEN  9/13/2021    IR PICC PLACEMENT SINGLE LUMEN  9/16/2021   [3]   Social History  Tobacco Use    Smoking status: Never     Passive exposure: Never    Smokeless tobacco: Never   Vaping Use    Vaping status: Never Used   Substance and  Sexual Activity    Alcohol use: Never    Drug use: No    Sexual activity: Not Currently   [4] No family history on file.

## 2025-05-28 NOTE — ASSESSMENT & PLAN NOTE
Lab Results   Component Value Date    HGBA1C 6.4 (H) 08/20/2024   Blood Sugar Average: Last 72 hrs   Well-controlled a/e/b A1c 6.4%, will update this admission  Hold home med [metformin]   Humalog SSI # 2   [facility] diet   Blood glucose monitoring q6h while NPO   Hypoglycemia protocol

## 2025-05-29 LAB
ABO GROUP BLD: NORMAL
ANION GAP SERPL CALCULATED.3IONS-SCNC: 7 MMOL/L (ref 4–13)
BLD GP AB SCN SERPL QL: NEGATIVE
BUN SERPL-MCNC: 19 MG/DL (ref 5–25)
CALCIUM SERPL-MCNC: 8.1 MG/DL (ref 8.4–10.2)
CHLORIDE SERPL-SCNC: 105 MMOL/L (ref 96–108)
CO2 SERPL-SCNC: 25 MMOL/L (ref 21–32)
CREAT SERPL-MCNC: 0.59 MG/DL (ref 0.6–1.3)
ERYTHROCYTE [DISTWIDTH] IN BLOOD BY AUTOMATED COUNT: 13.5 % (ref 11.6–15.1)
GFR SERPL CREATININE-BSD FRML MDRD: 111 ML/MIN/1.73SQ M
GLUCOSE P FAST SERPL-MCNC: 145 MG/DL (ref 65–99)
GLUCOSE SERPL-MCNC: 127 MG/DL (ref 65–140)
GLUCOSE SERPL-MCNC: 136 MG/DL (ref 65–140)
GLUCOSE SERPL-MCNC: 140 MG/DL (ref 65–140)
GLUCOSE SERPL-MCNC: 145 MG/DL (ref 65–140)
HCT VFR BLD AUTO: 28.4 % (ref 36.5–49.3)
HCT VFR BLD AUTO: 29.7 % (ref 36.5–49.3)
HGB BLD-MCNC: 10 G/DL (ref 12–17)
HGB BLD-MCNC: 10 G/DL (ref 12–17)
HGB BLD-MCNC: 9.7 G/DL (ref 12–17)
MCH RBC QN AUTO: 31.6 PG (ref 26.8–34.3)
MCHC RBC AUTO-ENTMCNC: 33.7 G/DL (ref 31.4–37.4)
MCV RBC AUTO: 94 FL (ref 82–98)
PLATELET # BLD AUTO: 125 THOUSANDS/UL (ref 149–390)
PMV BLD AUTO: 11.7 FL (ref 8.9–12.7)
POTASSIUM SERPL-SCNC: 3.8 MMOL/L (ref 3.5–5.3)
RBC # BLD AUTO: 3.16 MILLION/UL (ref 3.88–5.62)
RH BLD: POSITIVE
SODIUM SERPL-SCNC: 137 MMOL/L (ref 135–147)
SPECIMEN EXPIRATION DATE: NORMAL
WBC # BLD AUTO: 8.16 THOUSAND/UL (ref 4.31–10.16)

## 2025-05-29 PROCEDURE — 99205 OFFICE O/P NEW HI 60 MIN: CPT | Performed by: INTERNAL MEDICINE

## 2025-05-29 PROCEDURE — 85027 COMPLETE CBC AUTOMATED: CPT | Performed by: PHYSICIAN ASSISTANT

## 2025-05-29 PROCEDURE — 82948 REAGENT STRIP/BLOOD GLUCOSE: CPT

## 2025-05-29 PROCEDURE — 85018 HEMOGLOBIN: CPT

## 2025-05-29 PROCEDURE — 86901 BLOOD TYPING SEROLOGIC RH(D): CPT | Performed by: PHYSICIAN ASSISTANT

## 2025-05-29 PROCEDURE — 85014 HEMATOCRIT: CPT

## 2025-05-29 PROCEDURE — 85018 HEMOGLOBIN: CPT | Performed by: PHYSICIAN ASSISTANT

## 2025-05-29 PROCEDURE — 86850 RBC ANTIBODY SCREEN: CPT | Performed by: PHYSICIAN ASSISTANT

## 2025-05-29 PROCEDURE — 80048 BASIC METABOLIC PNL TOTAL CA: CPT | Performed by: PHYSICIAN ASSISTANT

## 2025-05-29 PROCEDURE — 86900 BLOOD TYPING SEROLOGIC ABO: CPT | Performed by: PHYSICIAN ASSISTANT

## 2025-05-29 PROCEDURE — 99232 SBSQ HOSP IP/OBS MODERATE 35: CPT

## 2025-05-29 RX ADMIN — ZONISAMIDE 300 MG: 100 CAPSULE ORAL at 21:44

## 2025-05-29 RX ADMIN — GABAPENTIN 300 MG: 300 CAPSULE ORAL at 21:43

## 2025-05-29 RX ADMIN — DIVALPROEX SODIUM 1000 MG: 500 TABLET, DELAYED RELEASE ORAL at 21:43

## 2025-05-29 RX ADMIN — CYANOCOBALAMIN TAB 500 MCG 500 MCG: 500 TAB at 10:57

## 2025-05-29 RX ADMIN — SODIUM CHLORIDE, SODIUM LACTATE, POTASSIUM CHLORIDE, AND CALCIUM CHLORIDE 75 ML/HR: .6; .31; .03; .02 INJECTION, SOLUTION INTRAVENOUS at 19:47

## 2025-05-29 RX ADMIN — DIVALPROEX SODIUM 500 MG: 500 TABLET, DELAYED RELEASE ORAL at 11:08

## 2025-05-29 RX ADMIN — QUETIAPINE FUMARATE 200 MG: 100 TABLET ORAL at 14:07

## 2025-05-29 RX ADMIN — GABAPENTIN 300 MG: 300 CAPSULE ORAL at 14:07

## 2025-05-29 RX ADMIN — LEVETIRACETAM 1500 MG: 500 TABLET, FILM COATED ORAL at 10:59

## 2025-05-29 RX ADMIN — LISINOPRIL 5 MG: 5 TABLET ORAL at 11:00

## 2025-05-29 RX ADMIN — LEVETIRACETAM 1500 MG: 500 TABLET, FILM COATED ORAL at 21:43

## 2025-05-29 RX ADMIN — SODIUM CHLORIDE, SODIUM LACTATE, POTASSIUM CHLORIDE, AND CALCIUM CHLORIDE 75 ML/HR: .6; .31; .03; .02 INJECTION, SOLUTION INTRAVENOUS at 06:58

## 2025-05-29 RX ADMIN — LACOSAMIDE 150 MG: 100 TABLET, FILM COATED ORAL at 11:02

## 2025-05-29 RX ADMIN — QUETIAPINE 400 MG: 200 TABLET, FILM COATED, EXTENDED RELEASE ORAL at 21:44

## 2025-05-29 RX ADMIN — LEVOTHYROXINE SODIUM 150 MCG: 0.15 TABLET ORAL at 06:16

## 2025-05-29 RX ADMIN — LACOSAMIDE 200 MG: 100 TABLET, FILM COATED ORAL at 21:43

## 2025-05-29 RX ADMIN — PRAVASTATIN SODIUM 40 MG: 40 TABLET ORAL at 15:32

## 2025-05-29 RX ADMIN — FOLIC ACID 1 MG: 1 TABLET ORAL at 11:02

## 2025-05-29 NOTE — CASE MANAGEMENT
Case Management Discharge Planning Note    Patient name Jairon Oconnell  Location [address] [address]/2  284- MRN [id number]  : 1965 Date 2025       Current Admission Date: 2025  Current Admission Diagnosis:Rectal bleeding   Patient Active Problem List    Diagnosis Date Noted    Rectal foreign body, sequela 2025    Rectal bleeding 2025    Vitamin B12 deficiency (dietary) anemia 2025    Disorders of magnesium metabolism, unspecified 2025    Other folate deficiency anemias 2025    Pure hypercholesterolemia 2025    On long term drug therapy 2025    Impaired fasting glucose 2025    Acute cystitis without hematuria 10/18/2024    Folic acid deficiency 2024    Seasonal allergies 2024    Pain of right great toe 2024    Iron deficiency anemia secondary to inadequate dietary iron intake 2024    Weakness 2024    Sepsis due to Escherichia coli without acute organ dysfunction (HCC) 2024    Acute cystitis with hematuria 2024    Slow transit constipation 07/10/2024    History of pneumothorax 2024    History of cardiac arrest 2024    Dysphagia 2024    Closed traumatic fracture of ribs of right side with pneumothorax 2024    Urinary retention 2024    Nondisplaced fracture of greater trochanter of right femur, subsequent encounter for closed fracture with routine healing 2024    Closed nondisplaced fracture of proximal phalanx of left index finger with routine healing, subsequent encounter 10/30/2023    Pressure ulcer of sacral region, stage 3 (HCC) 2023    Ambulatory dysfunction 2022    Social problem 2021    Hypomagnesemia 2021    Thrombocytopathia (HCC) 2020    Hyponatremia 2019    Metabolic encephalopathy 2019    Seborrheic dermatitis of scalp 2019    Nearsightedness 2019    Behavior concern in adult 2019    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/2019    Vitamin B12 deficiency 03/08/2017    Mixed hyperlipidemia 09/16/2016    Vitamin D deficiency 09/16/2016    Type 2 diabetes mellitus with diabetic neuropathy, without long-term current use of insulin (HCC) 06/15/2016    Acquired hypothyroidism 06/15/2016    Cataract 11/22/2013    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    Generalized convulsive epilepsy (HCC) 11/22/2013    Primary hypertension 11/22/2013    Osteoporosis 11/22/2013    Scoliosis 11/22/2013      LOS (days): 0  Geometric Mean LOS (GMLOS) (days):   Days to GMLOS:     OBJECTIVE:            Current admission status: Inpatient   Preferred Pharmacy:   [facility] [place] - [address]  [address] [place] [address]  Phone: [phone] Fax: [phone]    [id number] - [place] - [address]  [address]  [place] [address]  Phone: [phone] Fax: [phone]    Primary Care Provider: HUNTER Rose    Primary Insurance: MEDICARE  Secondary Insurance: [place] [employer]    DISCHARGE DETAILS:                                          Other Referral/Resources/Interventions Provided:  Referral Comments: Chart reviewed and pt discussed at SLIM rounds this AM.  Pt assigned to OBV d/t rectal bleeding;  small toy found in rectum.  Pt has caregiver at home.  GI cs, Hgb q 8 hrs, IVF at 75.  AM PAC of 19.  No recommendations made by tx team;  CM will continue to follow throughout hospital stay.         Treatment Team Recommendation: Home  Discharge Destination Plan:: Home  Transport at Discharge : Other (Comment) (TBD)

## 2025-05-29 NOTE — CONSULTS
Consultation - Gastroenterology   Name: Jairon Oconnell 60 y.o. male I MRN: [id number]  Unit/Bed#: 2 E 284-01 I Date of Admission: 5/28/2025   Date of Service: 5/29/2025 I Hospital Day: 0   Inpatient consult to gastroenterology  Consult performed by: Rhonda Stewart PA-C  Consult ordered by: Suzette Fraser PA-C        Physician Requesting Evaluation: Thierno Jones MD   Reason for Evaluation / Principal Problem: Rectal bleeding    Assessment & Plan  Rectal bleeding  - He was brought in yesterday by his caregiver after he inserted a toy windmill into his rectum and it was removed but bleeding occurred after  -CT on admission was negative for perforation or obvious injury but did show a large stool burden  -No further bleeding overnight reported and nothing today so far with his hemoglobin of 10.0 which is stable from his baseline though he was 12.8 yesterday which could have been an error  -Given no further bleeding we will hold off on any kind of endoscopic evaluation and recommend to continue with a bowel regimen to treat his underlying constipation  -If he has any further persistent rectal bleeding or drop in hemoglobin we can consider a flexible sigmoidoscopy for further evaluation  -Diet as tolerated  The patient will be seen by Dr. Blanco.    History of Present Illness   HPI:  Jairon Oconnell is a 60 y.o. male who presented yesterday for evaluation of bleeding per rectum after he was given a toy windmill to play with and inserted this into his rectum.  It was removed intact reportedly by his family and caregiver.  He is mostly nonverbal and there is no family at bedside today.  CT admission was negative for perforation but did show large amount of stool burden.  He shakes his head no when asked about abdominal pain.    Review of Systems  Historical Information   Past Medical History[1]  Past Surgical History[2]  Social History[3]  E-Cigarette/Vaping    E-Cigarette Use Never User      E-Cigarette/Vaping Substances     Nicotine No     THC No     CBD No     Flavoring No     Other No     Unknown No      Family history non-contributory    Current Facility-Administered Medications:     acetaminophen (TYLENOL) tablet 650 mg, Q6H PRN    aluminum-magnesium hydroxide-simethicone (MAALOX) oral suspension 30 mL, Q6H PRN    bisacodyl (DULCOLAX) rectal suppository 10 mg, Daily PRN    cyanocobalamin (VITAMIN B-12) tablet 500 mcg, Daily    divalproex sodium (DEPAKOTE) DR tablet 500 mg, QAM **AND** divalproex sodium (DEPAKOTE) DR tablet 1,000 mg, HS    folic acid (FOLVITE) tablet 1 mg, Daily    gabapentin (NEURONTIN) capsule 300 mg, TID    haloperidol lactate (HALDOL) injection 5 mg, Q6H PRN    insulin lispro (HumALOG/ADMELOG) 100 units/mL subcutaneous injection 1-5 Units, Q6H RONALD **AND** Fingerstick Glucose (POCT), Q6H    lacosamide (VIMPAT) tablet 150 mg, QAM    lacosamide (VIMPAT) tablet 200 mg, HS    lactated ringers infusion, Continuous, Last Rate: 75 mL/hr (05/29/25 0658)    levETIRAcetam (KEPPRA) tablet 1,500 mg, Q12H RONALD    levothyroxine tablet 150 mcg, Early Morning    lisinopril (ZESTRIL) tablet 5 mg, Daily    pravastatin (PRAVACHOL) tablet 40 mg, Daily With Dinner    QUEtiapine (SEROquel XR) 24 hr tablet 400 mg, HS    QUEtiapine (SEROquel) tablet 200 mg, BID before breakfast/lunch    trimethobenzamide (TIGAN) IM injection 200 mg, Q6H PRN    zonisamide (ZONEGRAN) capsule 300 mg, HS  Prior to Admission Medications   Prescriptions Last Dose Informant Patient Reported? Taking?   Ascorbic Acid (Vitamin C ER) 500 MG CPCR 5/27/2025 Morning  No Yes   Sig: TAKE 2 TABLETS BY MOUTH DAILY DX IRON DEFICIENCY ANEMIAS (=1000MG)   Depakote 500 MG DR tablet 5/27/2025 Evening  No Yes   Sig: Take 1 tab in the morning and 2 tabs at night. Brand necessary   Disposable Gloves (Safe-Sense Glove-Blk-Nitrl-L) MISC  Care Giver No No   Sig: Use 1 each 3 (three) times a day as needed (care taker assisting with soiled briefs)   Incontinence Supply Disposable  (Briefs Overnight Medium) MISC  Care Giver No No   Sig: Use in the morning   Multiple Vitamin (Daily-Reina) TABS 5/27/2025 Morning Care Giver No Yes   Sig: Take 1 tablet by mouth daily Take at 8 AM   OneTouch Delica Lancets 33G MISC  Care Giver No No   Sig: Check blood sugars twice daily. Please substitute with appropriate alternative as covered by patient's insurance. Dx: E11.65   QUEtiapine (SEROquel XR) 400 mg 24 hr tablet 5/27/2025 Evening Care Giver No Yes   Sig: Take 1 tablet (400 mg total) by mouth daily at bedtime   QUEtiapine (SEROquel) 200 mg tablet 5/27/2025 Noon Care Giver No Yes   Sig: Take 1 tablet (200 mg total) by mouth 2 (two) times a day before breakfast and lunch   Saccharomyces boulardii (Probiotic) 250 MG CAPS 5/27/2025 Evening  No Yes   Sig: GIVE 1 CAPSULE BY MOUTH TWICE DAILY FOR URINARY TRACT INFECTION   cyanocobalamin (VITAMIN B-12) 500 MCG tablet  Care Giver No No   Sig: Take 1 tablet (500 mcg total) by mouth daily   docusate sodium (COLACE) 100 mg capsule 5/27/2025 Evening Care Giver No Yes   Sig: Take 1 capsule (100 mg total) by mouth 2 (two) times a day   folic acid (FOLVITE) 1 mg tablet 5/27/2025 Morning Care Giver No Yes   Sig: GIVE 1 TABLET BY MOUTH DAILY DX METABOLIC ENCEPHALOPATHY   gabapentin (NEURONTIN) 300 mg capsule 5/27/2025 Evening Care Giver Yes Yes   Sig: Take 300 mg by mouth in the morning and 300 mg in the evening and 300 mg before bedtime.   glucose blood (OneTouch Verio) test strip  Care Giver No No   Sig: Check blood sugars once daily. Please substitute with appropriate alternative as covered by patient's insurance. Dx: E11.65   glucose blood (OneTouch Verio) test strip  Care Giver No No   Sig: Check blood sugars twice daily. Please substitute with appropriate alternative as covered by patient's insurance. Dx: E11.65   lacosamide (VIMPAT) 150 mg tablet 5/27/2025 Morning  No Yes   Sig: Take 1 tab (150 mg) in the morning.   lacosamide (VIMPAT) 200 mg tablet 5/27/2025  Evening  No Yes   Sig: Take 1 tab (200 mg) at night.   levETIRAcetam (KEPPRA) 750 mg tablet 5/27/2025 Evening  No Yes   Sig: Give 2 tabs (1500 mg) by mouth twice a day   levothyroxine 150 mcg tablet 5/27/2025 Morning  No Yes   Sig: GIVE 1 TABLET BY MOUTH IN THE MORNING DX HYPOTHYROIDISM (DR HENDRICKSON)   lisinopril (ZESTRIL) 5 mg tablet 5/27/2025 Morning Care Giver No Yes   Sig: Take 1 tablet (5 mg total) by mouth daily   loratadine (CLARITIN) 10 mg tablet 5/27/2025 Morning Care Giver No Yes   Sig: Take 1 tablet (10 mg total) by mouth daily   magnesium Oxide (MAG-OX) 400 mg TABS  Care Giver No No   Sig: Take 1 tablet (400 mg total) by mouth 2 (two) times a day   metFORMIN (GLUCOPHAGE) 1000 MG tablet 5/27/2025 Evening Care Giver No Yes   Sig: GIVE 1 TABLET BY MOUTH TWICE DAILY WITH MEALS FOR DIABETES   methenamine hippurate (HIPREX) 1 g tablet 5/27/2025 Evening  No Yes   Sig: GIVE 1 TABLET BY MOUTH TWICE DAILY WITH A MEAL URINARY TRACT INFECTION   polyethylene glycol (MIRALAX) 17 g packet   No No   Sig: Take 17 g by mouth daily for 14 days   senna-docusate sodium (SENOKOT S) 8.6-50 mg per tablet  Care Giver No No   Sig: Take 2 tablets by mouth 2 (two) times a day   simvastatin (ZOCOR) 40 mg tablet 5/27/2025 Evening Care Giver No Yes   Sig: GIVE 1 TABLET BY MOUTH AT BEDTIME FOR CHOLESTEROL   zonisamide (ZONEGRAN) 100 mg capsule 5/27/2025 Evening  No Yes   Sig: Take 3 capsules (300 mg total) by mouth daily at bedtime      Facility-Administered Medications: None       Objective :  Temp:  [97.2 °F (36.2 °C)-98.6 °F (37 °C)] 97.2 °F (36.2 °C)  HR:  [82-94] 82  BP: (119-152)/(62-87) 130/78  Resp:  [18] 18  SpO2:  [94 %-96 %] 94 %  O2 Device: None (Room air)    Physical Exam  General- Alert, unable to answer questions due to baseline mental status  Abdomen- Mild gaseous distention, soft, BS active, NTTP    Lab Results: I have reviewed the following results:CBC/BMP:   .     05/29/25  0803   WBC 8.16   HGB 10.0*   HCT 29.7*    *   SODIUM 137   K 3.8      CO2 25   BUN 19   CREATININE 0.59*   GLUC 145*    , Creatinine Clearance: Estimated Creatinine Clearance: 149.8 mL/min (A) (by C-G formula based on SCr of 0.59 mg/dL (L))., LFTs: No new results in last 24 hours. , Lactic Acid:   .     05/28/25  1347   LACTICACID 2.1*        Imaging Results Review: I reviewed radiology reports from this admission including: CT abdomen/pelvis.  Other Study Results Review: No additional pertinent studies reviewed.           [1]   Past Medical History:  Diagnosis Date    ADRIANA (acute kidney injury) ([facility]) 09/24/2019    Bacteremia due to Gram-positive bacteria 09/07/2021    Buttock wound, left, subsequent encounter 11/05/2021    COVID-19     CP (cerebral palsy) ([facility])     Depression     Diabetes ([facility])     Diabetic eye exam ([facility]) 10/06/2024    Disease of thyroid gland     Encounter for diabetic foot exam ([facility]) 10/06/2024    Encounter for immunization 10/06/2024    Gait disorder     Gluteal abscess 09/06/2021    Hospital discharge follow-up 09/06/2024    Hypertension     Kidney failure     Kidney stones     Medicare annual wellness visit, subsequent 10/06/2024    Moderate protein-calorie malnutrition ([facility]) 12/22/2021    Osteoporosis     Pressure injury of left buttock, stage 4 ([facility]) 03/09/2022    Psychiatric disorder     Scoliosis     Screening for diabetes mellitus 09/06/2024    Screening for malignant neoplasm of prostate 05/12/2025    Screening for prostate cancer 09/06/2024    Screening for thyroid disorder 09/06/2024    Seizures ([facility])     Sepsis ([facility]) 09/25/2019    Thyroid disease     UTI (urinary tract infection)    [2]   Past Surgical History:  Procedure Laterality Date    APPENDECTOMY      IR CHEST TUBE PLACEMENT  6/14/2024    IR PICC PLACEMENT SINGLE LUMEN  9/13/2021    IR PICC PLACEMENT SINGLE LUMEN  9/16/2021   [3]   Social History  Tobacco Use    Smoking status: Never     Passive exposure: Never    Smokeless tobacco: Never   Vaping Use     Vaping status: Never Used   Substance and Sexual Activity    Alcohol use: Never    Drug use: No    Sexual activity: Not Currently

## 2025-05-29 NOTE — PROGRESS NOTES
Patient had large, soft, brown bowel movement in toilet. Some bright red blood noted in toilet as well. Patient tolerated well, escorted back to bed. SLIM Ancelmo Mota made aware.   Meghan Solorio  3:43 PM  05/29/25  =======================

## 2025-05-29 NOTE — ASSESSMENT & PLAN NOTE
- He was brought in yesterday by his caregiver after he inserted a toy windmill into his rectum and it was removed but bleeding occurred after  -CT on admission was negative for perforation or obvious injury but did show a large stool burden  -No further bleeding overnight reported and nothing today so far with his hemoglobin of 10.0 which is stable from his baseline though he was 12.8 yesterday which could have been an error  -Given no further bleeding we will hold off on any kind of endoscopic evaluation and recommend to continue with a bowel regimen to treat his underlying constipation  -If he has any further persistent rectal bleeding or drop in hemoglobin we can consider a flexible sigmoidoscopy for further evaluation  -Diet as tolerated

## 2025-05-29 NOTE — ASSESSMENT & PLAN NOTE
Lab Results   Component Value Date    HGBA1C 6.7 (H) 05/28/2025   Blood Sugar Average: Last 72 hrs (P) 138.5  Well-controlled a/e/b A1c 6.4%, will update this admission  Hold home med [metformin]   Humalog SSI # 2   [facility] diet   Blood glucose monitoring q6h while NPO   Hypoglycemia protocol

## 2025-05-29 NOTE — PLAN OF CARE
Problem: PAIN - ADULT  Goal: Verbalizes/displays adequate comfort level or baseline comfort level  Description: Interventions:  - Encourage patient to monitor pain and request assistance  - Assess pain using appropriate pain scale  - Administer analgesics as ordered based on type and severity of pain and evaluate response  - Implement non-pharmacological measures as appropriate and evaluate response  - Consider cultural and social influences on pain and pain management  - Notify physician/advanced practitioner if interventions unsuccessful or patient reports new pain  - Educate patient/family on pain management process including their role and importance of  reporting pain   - Provide non-pharmacologic/complimentary pain relief interventions  Outcome: Progressing     Problem: INFECTION - ADULT  Goal: Absence or prevention of progression during hospitalization  Description: INTERVENTIONS:  - Assess and monitor for signs and symptoms of infection  - Monitor lab/diagnostic results  - Monitor all insertion sites, i.e. indwelling lines, tubes, and drains  - Monitor endotracheal if appropriate and nasal secretions for changes in amount and color  - Schroeder appropriate cooling/warming therapies per order  - Administer medications as ordered  - Instruct and encourage patient and family to use good hand hygiene technique  - Identify and instruct in appropriate isolation precautions for identified infection/condition  Outcome: Progressing  Goal: Absence of fever/infection during neutropenic period  Description: INTERVENTIONS:  - Monitor WBC  - Perform strict hand hygiene  - Limit to healthy visitors only  - No plants, dried, fresh or silk flowers with narayan in patient room  Outcome: Progressing     Problem: SAFETY ADULT  Goal: Patient will remain free of falls  Description: INTERVENTIONS:  - Educate patient/family on patient safety including physical limitations  - Instruct patient to call for assistance with activity   -  Consider consulting OT/PT to assist with strengthening/mobility based on AM PAC & JH-HLM score  - Consult OT/PT to assist with strengthening/mobility   - Keep Call bell within reach  - Keep bed low and locked with side rails adjusted as appropriate  - Keep care items and personal belongings within reach  - Initiate and maintain comfort rounds  - Make Fall Risk Sign visible to staff  - Offer Toileting every 2 Hours, in advance of need  - Initiate/Maintain bed alarm  - Obtain necessary fall risk management equipment: 2  - Apply yellow socks and bracelet for high fall risk patients  - Consider moving patient to room near nurses station  Outcome: Progressing  Goal: Maintain or return to baseline ADL function  Description: INTERVENTIONS:  -  Assess patient's ability to carry out ADLs; assess patient's baseline for ADL function and identify physical deficits which impact ability to perform ADLs (bathing, care of mouth/teeth, toileting, grooming, dressing, etc.)  - Assess/evaluate cause of self-care deficits   - Assess range of motion  - Assess patient's mobility; develop plan if impaired  - Assess patient's need for assistive devices and provide as appropriate  - Encourage maximum independence but intervene and supervise when necessary  - Involve family in performance of ADLs  - Assess for home care needs following discharge   - Consider OT consult to assist with ADL evaluation and planning for discharge  - Provide patient education as appropriate  - Monitor functional capacity and physical performance, use of AM PAC & JH-HLM   - Monitor gait, balance and fatigue with ambulation    Outcome: Progressing  Goal: Maintains/Returns to pre admission functional level  Description: INTERVENTIONS:  - Perform AM-PAC 6 Click Basic Mobility/ Daily Activity assessment daily.  - Set and communicate daily mobility goal to care team and patient/family/caregiver.   - Collaborate with rehabilitation services on mobility goals if  consulted  - Perform Range of Motion 2 times a day.  - Reposition patient every 2 hours.  - Dangle patient 2 times a day  - Stand patient 2 times a day  - Ambulate patient 2 times a day  - Out of bed to chair 2 times a day   - Out of bed for meals 2 times a day  - Out of bed for toileting  - Record patient progress and toleration of activity level   Outcome: Progressing     Problem: DISCHARGE PLANNING  Goal: Discharge to home or other facility with appropriate resources  Description: INTERVENTIONS:  - Identify barriers to discharge w/patient and caregiver  - Arrange for needed discharge resources and transportation as appropriate  - Identify discharge learning needs (meds, wound care, etc.)  - Arrange for interpretive services to assist at discharge as needed  - Refer to Case Management Department for coordinating discharge planning if the patient needs post-hospital services based on physician/advanced practitioner order or complex needs related to functional status, cognitive ability, or social support system  Outcome: Progressing     Problem: Knowledge Deficit  Goal: Patient/family/caregiver demonstrates understanding of disease process, treatment plan, medications, and discharge instructions  Description: Complete learning assessment and assess knowledge base.  Interventions:  - Provide teaching at level of understanding  - Provide teaching via preferred learning methods  Outcome: Progressing     Problem: Prexisting or High Potential for Compromised Skin Integrity  Goal: Skin integrity is maintained or improved  Description: INTERVENTIONS:  - Identify patients at risk for skin breakdown  - Assess and monitor skin integrity including under and around medical devices   - Assess and monitor nutrition and hydration status  - Monitor labs  - Assess for incontinence   - Turn and reposition patient  - Assist with mobility/ambulation  - Relieve pressure over monet prominences   - Avoid friction and shearing  - Provide  appropriate hygiene as needed including keeping skin clean and dry  - Evaluate need for skin moisturizer/barrier cream  - Collaborate with interdisciplinary team  - Patient/family teaching  - Consider wound care consult    Assess:  - Review Nolan scale daily  - Clean and moisturize skin every 2  - Inspect skin when repositioning, toileting, and assisting with ADLS  - Assess under medical devices such as 2 every 2  - Assess extremities for adequate circulation and sensation     Bed Management:  - Have minimal linens on bed & keep smooth, unwrinkled  - Change linens as needed when moist or perspiring  - Avoid sitting or lying in one position for more than 2 hours while in bed?Keep HOB at 45degrees   - Toileting:  - Offer bedside commode  - Assess for incontinence every 2  - Use incontinent care products a2fter each incontinent episode such as 2    Activity:  - Mobilize patient 2 times a day  - Encourage activity and walks on unit  - Encourage or provide ROM exercises   - Turn and reposition patient every 2 Hours  - Use appropriate equipment to lift or move patient in bed  - Instruct/ Assist with weight shifting every 2 when out of bed in chair  - Consider limitation of chair time 2 hour intervals    Skin Care:  - Avoid use of baby powder, tape, friction and shearing, hot water or constrictive clothing  - Relieve pressure over bony prominences using 2  - Do not massage red bony areas    Next Steps:  - Teach patient strategies to minimize risks such as 2  - Consider consults to  interdisciplinary teams such as 2  Outcome: Progressing

## 2025-05-29 NOTE — PLAN OF CARE
Problem: SAFETY ADULT  Goal: Patient will remain free of falls  Description: INTERVENTIONS:  - Educate patient/family on patient safety including physical limitations  - Instruct patient to call for assistance with activity   - Consider consulting OT/PT to assist with strengthening/mobility based on AM PAC & JH-HLM score  - Consult OT/PT to assist with strengthening/mobility   - Keep Call bell within reach  - Keep bed low and locked with side rails adjusted as appropriate  - Keep care items and personal belongings within reach  - Initiate and maintain comfort rounds  - Make Fall Risk Sign visible to staff  - Offer Toileting every  Hours, in advance of need  - Initiate/Maintain alarm  - Obtain necessary fall risk management equipment:   - Apply yellow socks and bracelet for high fall risk patients  - Consider moving patient to room near nurses station  Outcome: Progressing

## 2025-05-29 NOTE — ASSESSMENT & PLAN NOTE
Chronic, last TSH visible is from 20204 and elevated with low FT4  Update TSH  Continue levothyroxine 150 mcg daily

## 2025-05-29 NOTE — ASSESSMENT & PLAN NOTE
Presents with report of BRBPR following insertion of a foreign body (toy windmill). Foreign body removed however given the trauma  Serial abdominal exams  Reports no current bowel movement or current bleeding  Hemoglobin 9.4 and up trended to 12, down trended to 10, I believe 12 hemoglobin likely in error  GI following and if any ongoing bleeding, will likely have a flex sig, will defer at this time given no ongoing evidence of bleeding  Will continue to monitor hemoglobin

## 2025-05-29 NOTE — ASSESSMENT & PLAN NOTE
Monitor mood and behaviors, required haldol in ER for agitation  Continue Seroquel 200 mg am, mid-day + Seroquel  mg pm

## 2025-05-29 NOTE — PLAN OF CARE
Problem: PAIN - ADULT  Goal: Verbalizes/displays adequate comfort level or baseline comfort level  Description: Interventions:  - Encourage patient to monitor pain and request assistance  - Assess pain using appropriate pain scale  - Administer analgesics as ordered based on type and severity of pain and evaluate response  - Implement non-pharmacological measures as appropriate and evaluate response  - Consider cultural and social influences on pain and pain management  - Notify physician/advanced practitioner if interventions unsuccessful or patient reports new pain  - Educate patient/family on pain management process including their role and importance of  reporting pain   - Provide non-pharmacologic/complimentary pain relief interventions  Outcome: Progressing     Problem: INFECTION - ADULT  Goal: Absence or prevention of progression during hospitalization  Description: INTERVENTIONS:  - Assess and monitor for signs and symptoms of infection  - Monitor lab/diagnostic results  - Monitor all insertion sites, i.e. indwelling lines, tubes, and drains  - Monitor endotracheal if appropriate and nasal secretions for changes in amount and color  - Sapello appropriate cooling/warming therapies per order  - Administer medications as ordered  - Instruct and encourage patient and family to use good hand hygiene technique  - Identify and instruct in appropriate isolation precautions for identified infection/condition  Outcome: Progressing  Goal: Absence of fever/infection during neutropenic period  Description: INTERVENTIONS:  - Monitor WBC  - Perform strict hand hygiene  - Limit to healthy visitors only  - No plants, dried, fresh or silk flowers with narayan in patient room  Outcome: Progressing     Problem: SAFETY ADULT  Goal: Patient will remain free of falls  Description: INTERVENTIONS:  - Educate patient/family on patient safety including physical limitations  - Instruct patient to call for assistance with activity   -  Consider consulting OT/PT to assist with strengthening/mobility based on AM PAC & JH-HLM score  - Consult OT/PT to assist with strengthening/mobility   - Keep Call bell within reach  - Keep bed low and locked with side rails adjusted as appropriate  - Keep care items and personal belongings within reach  - Initiate and maintain comfort rounds  - Make Fall Risk Sign visible to staff  - Offer Toileting every Hours, in advance of need  - Initiate/Maintain alarm  - Obtain necessary fall risk management equipment:   - Apply yellow socks and bracelet for high fall risk patients  - Consider moving patient to room near nurses station  Outcome: Progressing  Goal: Maintain or return to baseline ADL function  Description: INTERVENTIONS:  -  Assess patient's ability to carry out ADLs; assess patient's baseline for ADL function and identify physical deficits which impact ability to perform ADLs (bathing, care of mouth/teeth, toileting, grooming, dressing, etc.)  - Assess/evaluate cause of self-care deficits   - Assess range of motion  - Assess patient's mobility; develop plan if impaired  - Assess patient's need for assistive devices and provide as appropriate  - Encourage maximum independence but intervene and supervise when necessary  - Involve family in performance of ADLs  - Assess for home care needs following discharge   - Consider OT consult to assist with ADL evaluation and planning for discharge  - Provide patient education as appropriate  - Monitor functional capacity and physical performance, use of AM PAC & JH-HLM   - Monitor gait, balance and fatigue with ambulation    Outcome: Progressing  Goal: Maintains/Returns to pre admission functional level  Description: INTERVENTIONS:  - Perform AM-PAC 6 Click Basic Mobility/ Daily Activity assessment daily.  - Set and communicate daily mobility goal to care team and patient/family/caregiver.   - Collaborate with rehabilitation services on mobility goals if consulted  -  Perform Range of Motion  times a day.  - Reposition patient every hours.  - Dangle patient  times a day  - Stand patient times a day  - Ambulate patient  times a day  - Out of bed to chair  times a day   - Out of bed for meals times a day  - Out of bed for toileting  - Record patient progress and toleration of activity level   Outcome: Progressing     Problem: DISCHARGE PLANNING  Goal: Discharge to home or other facility with appropriate resources  Description: INTERVENTIONS:  - Identify barriers to discharge w/patient and caregiver  - Arrange for needed discharge resources and transportation as appropriate  - Identify discharge learning needs (meds, wound care, etc.)  - Arrange for interpretive services to assist at discharge as needed  - Refer to Case Management Department for coordinating discharge planning if the patient needs post-hospital services based on physician/advanced practitioner order or complex needs related to functional status, cognitive ability, or social support system  Outcome: Progressing     Problem: Knowledge Deficit  Goal: Patient/family/caregiver demonstrates understanding of disease process, treatment plan, medications, and discharge instructions  Description: Complete learning assessment and assess knowledge base.  Interventions:  - Provide teaching at level of understanding  - Provide teaching via preferred learning methods  Outcome: Progressing     Problem: Prexisting or High Potential for Compromised Skin Integrity  Goal: Skin integrity is maintained or improved  Description: INTERVENTIONS:  - Identify patients at risk for skin breakdown  - Assess and monitor skin integrity including under and around medical devices   - Assess and monitor nutrition and hydration status  - Monitor labs  - Assess for incontinence   - Turn and reposition patient  - Assist with mobility/ambulation  - Relieve pressure over monet prominences   - Avoid friction and shearing  - Provide appropriate hygiene as  needed including keeping skin clean and dry  - Evaluate need for skin moisturizer/barrier cream  - Collaborate with interdisciplinary team  - Patient/family teaching  - Consider wound care consult    Assess:  - Review Nolan scale daily  - Clean and moisturize skin every   - Inspect skin when repositioning, toileting, and assisting with ADLS  - Assess under medical devices such as  every  - Assess extremities for adequate circulation and sensation     Bed Management:  - Have minimal linens on bed & keep smooth, unwrinkled  - Change linens as needed when moist or perspiring  - Avoid sitting or lying in one position for more than  hours while in bed?Keep HOB at degrees   - Toileting:  - Offer bedside commode  - Assess for incontinence every  - Use incontinent care products after each incontinent episode such as     Activity:  - Mobilize eliana times a day  - Encourage activity and walks on unit  - Encourage or provide ROM exercises   - Turn and reposition patient every  Hours  - Use appropriate equipment to lift or move patient in bed  - Instruct/ Assist with weight shifting every  when out of bed in chair  - Consider limitation of chair time hour intervals    Skin Care:  - Avoid use of baby powder, tape, friction and shearing, hot water or constrictive clothing  - Relieve pressure over bony prominences using   - Do not massage red bony areas    Next Steps:  - Teach patient strategies to minimize risks such as  - Consider consults to  interdisciplinary teams such as   Outcome: Progressing

## 2025-05-29 NOTE — PROGRESS NOTES
Progress Note - Hospitalist   Name: Jairon Oconnell 60 y.o. male I MRN: [id number]  Unit/Bed#: 2 E 284-01 I Date of Admission: 5/28/2025   Date of Service: 5/29/2025 I Hospital Day: 0    Assessment & Plan  Rectal bleeding  Presents with report of BRBPR following insertion of a foreign body (toy windmill). Foreign body removed however given the trauma  Serial abdominal exams  Reports no current bowel movement or current bleeding  Hemoglobin 9.4 and up trended to 12, down trended to 10, I believe 12 hemoglobin likely in error  GI following and if any ongoing bleeding, will likely have a flex sig, will defer at this time given no ongoing evidence of bleeding  Will continue to monitor hemoglobin  Rectal foreign body, sequela  Monitor patient, maintain safe enviorment   Type 2 diabetes mellitus with diabetic neuropathy, without long-term current use of insulin (HCC)  Lab Results   Component Value Date    HGBA1C 6.7 (H) 05/28/2025   Blood Sugar Average: Last 72 hrs (P) 138.5  Well-controlled a/e/b A1c 6.4%, will update this admission  Hold home med [metformin]   Humalog SSI # 2   [facility] diet   Blood glucose monitoring q6h while NPO   Hypoglycemia protocol  Ataxic cerebral palsy (HCC)  History noted, fall precautions  Baseline stand pivot, frequent napping  Generalized convulsive epilepsy (HCC)  History noted, no recent seizure activity  Continue home AEDs:   Depakote 500 mg am, 1000 mg pm  Vimpat 150 mg am, 200 mg pm  Keppra 1500 mg bid  Zonegran 300 mg pm  Seizure precautions  Primary hypertension  Chronic, Monitor VS per unit protocol   Continue home dose lisinopril 5 mg daily   Acquired hypothyroidism  Chronic, last TSH visible is from 20204 and elevated with low FT4  Update TSH  Continue levothyroxine 150 mcg daily  Slow transit constipation  Chronic issue, on miralax and colace at baseline   CT a/p :moderate to large colonic stool burden  Will give dose of Magnesium Citrate and monitor for effect  Dulcolax CA if needed  vs enema  Behavior concern in adult  Monitor mood and behaviors, required haldol in ER for agitation  Continue Seroquel 200 mg am, mid-day + Seroquel  mg pm    VTE Pharmacologic Prophylaxis: VTE Score: 1 Low Risk (Score 0-2) - Encourage Ambulation.    Mobility:   Basic Mobility Inpatient Raw Score: 18  JH-HLM Goal: 6: Walk 10 steps or more  JH-HLM Achieved: 1: Laying in bed  JH-HLM Goal achieved. Continue to encourage appropriate mobility.    Patient Centered Rounds: I performed bedside rounds with nursing staff today.   Discussions with Specialists or Other Care Team Provider: CM, GI    Education and Discussions with Family / Patient: Updated  (caregiver) at bedside.    Current Length of Stay: 0 day(s)  Current Patient Status: Observation   Certification Statement: The patient will continue to require additional inpatient hospital stay due to continue to monitor hemoglobin in setting of GI bleed  Discharge Plan: Anticipate discharge tomorrow to home with home services.    Code Status: Level 1 - Full Code    Subjective   Patient reports to be feeling well.  Currently denies any any chest pain or pressure.  Patient overall is poor historian    Objective :  Temp:  [97.2 °F (36.2 °C)-98.6 °F (37 °C)] 97.2 °F (36.2 °C)  HR:  [82-94] 82  BP: (119-152)/(62-87) 130/78  Resp:  [18] 18  SpO2:  [94 %-96 %] 94 %  O2 Device: None (Room air)    Body mass index is 26.34 kg/m².     Input and Output Summary (last 24 hours):     Intake/Output Summary (Last 24 hours) at 5/29/2025 1257  Last data filed at 5/28/2025 2044  Gross per 24 hour   Intake --   Output 450 ml   Net -450 ml       Physical Exam  Vitals and nursing note reviewed.   Constitutional:       General: He is not in acute distress.     Appearance: He is normal weight. He is not ill-appearing, toxic-appearing or diaphoretic.   HENT:      Head: Normocephalic.      Nose: Nose normal.      Mouth/Throat:      Mouth: Mucous membranes are moist.      Pharynx:  Oropharynx is clear.     Eyes:      General: No scleral icterus.     Conjunctiva/sclera: Conjunctivae normal.      Pupils: Pupils are equal, round, and reactive to light.       Cardiovascular:      Rate and Rhythm: Normal rate and regular rhythm.      Heart sounds: No murmur heard.     No friction rub. No gallop.   Pulmonary:      Effort: Pulmonary effort is normal. No respiratory distress.      Breath sounds: Normal breath sounds. No stridor. No wheezing, rhonchi or rales.   Abdominal:      General: Abdomen is flat.      Palpations: Abdomen is soft.     Musculoskeletal:         General: Normal range of motion.      Cervical back: Normal range of motion and neck supple.      Right lower leg: No edema.      Left lower leg: No edema.   Lymphadenopathy:      Cervical: No cervical adenopathy.     Skin:     General: Skin is warm.      Coloration: Skin is not jaundiced or pale.      Findings: No bruising, erythema or lesion.     Neurological:      General: No focal deficit present.      Mental Status: He is alert. Mental status is at baseline.      Cranial Nerves: No cranial nerve deficit.      Motor: No weakness.      Comments: Increase somnolence compared to baseline   Psychiatric:         Mood and Affect: Mood normal.         Behavior: Behavior normal.         Thought Content: Thought content normal.           Lines/Drains:        Telemetry:  Telemetry Orders (From admission, onward)               24 Hour Telemetry Monitoring  Continuous x 24 Hours (Telem)        Expiring   Question:  Reason for 24 Hour Telemetry  Answer:  Metabolic/electrolyte disturbance with high probability of dysrhythmia. K level <3 or >6 OR KCL infusion >10mEq/hr                     Telemetry Reviewed: Normal Sinus Rhythm  Indication for Continued Telemetry Use: No indication for continued use. Will discontinue.                Lab Results: I have reviewed the following results:   Results from last 7 days   Lab Units 05/29/25  0803 05/28/25  1001    WBC Thousand/uL 8.16 8.36   HEMOGLOBIN g/dL 10.0* 12.8   HEMATOCRIT % 29.7* 38.6   PLATELETS Thousands/uL 125* 132*   SEGS PCT %  --  42*   LYMPHO PCT %  --  40   MONO PCT %  --  14*   EOS PCT %  --  2     Results from last 7 days   Lab Units 05/29/25  0803 05/28/25  1004   SODIUM mmol/L 137 141   POTASSIUM mmol/L 3.8 4.2   CHLORIDE mmol/L 105 109*   CO2 mmol/L 25 21   BUN mg/dL 19 23   CREATININE mg/dL 0.59* 0.70   ANION GAP mmol/L 7 11   CALCIUM mg/dL 8.1* 8.9   ALBUMIN g/dL  --  3.9   TOTAL BILIRUBIN mg/dL  --  0.26   ALK PHOS U/L  --  40   ALT U/L  --  15   AST U/L  --  15   GLUCOSE RANDOM mg/dL 145* 155*     Results from last 7 days   Lab Units 05/28/25  1004   INR  0.90     Results from last 7 days   Lab Units 05/29/25  1108 05/29/25  0745 05/29/25  0558 05/28/25  2346 05/28/25  2155 05/28/25  1931   POC GLUCOSE mg/dl 136 127 140 132 157* 139     Results from last 7 days   Lab Units 05/28/25  1004   HEMOGLOBIN A1C % 6.7*     Results from last 7 days   Lab Units 05/28/25  1347 05/28/25  1004   LACTIC ACID mmol/L 2.1* 2.9*       Recent Cultures (last 7 days):         Imaging Results Review: No pertinent imaging studies reviewed.  Other Study Results Review: No additional pertinent studies reviewed.    Last 24 Hours Medication List:     Current Facility-Administered Medications:     acetaminophen (TYLENOL) tablet 650 mg, Q6H PRN    aluminum-magnesium hydroxide-simethicone (MAALOX) oral suspension 30 mL, Q6H PRN    bisacodyl (DULCOLAX) rectal suppository 10 mg, Daily PRN    cyanocobalamin (VITAMIN B-12) tablet 500 mcg, Daily    divalproex sodium (DEPAKOTE) DR tablet 500 mg, QAM **AND** divalproex sodium (DEPAKOTE) DR tablet 1,000 mg, HS    folic acid (FOLVITE) tablet 1 mg, Daily    gabapentin (NEURONTIN) capsule 300 mg, TID    haloperidol lactate (HALDOL) injection 5 mg, Q6H PRN    insulin lispro (HumALOG/ADMELOG) 100 units/mL subcutaneous injection 1-5 Units, Q6H RONALD **AND** Fingerstick Glucose (POCT), Q6H     lacosamide (VIMPAT) tablet 150 mg, QAM    lacosamide (VIMPAT) tablet 200 mg, HS    lactated ringers infusion, Continuous, Last Rate: 75 mL/hr (05/29/25 0658)    levETIRAcetam (KEPPRA) tablet 1,500 mg, Q12H RONALD    levothyroxine tablet 150 mcg, Early Morning    lisinopril (ZESTRIL) tablet 5 mg, Daily    pravastatin (PRAVACHOL) tablet 40 mg, Daily With Dinner    QUEtiapine (SEROquel XR) 24 hr tablet 400 mg, HS    QUEtiapine (SEROquel) tablet 200 mg, BID before breakfast/lunch    trimethobenzamide (TIGAN) IM injection 200 mg, Q6H PRN    zonisamide (ZONEGRAN) capsule 300 mg, HS    Administrative Statements   Today, Patient Was Seen By: Ancelmo Mota PA-C  I have spent a total time of 35 minutes in caring for this patient on the day of the visit/encounter including Counseling / Coordination of care, Documenting in the medical record, Reviewing/placing orders in the medical record (including tests, medications, and/or procedures), Obtaining or reviewing history  , and Communicating with other healthcare professionals .    **Please Note: This note may have been constructed using a voice recognition system.**

## 2025-05-29 NOTE — ASSESSMENT & PLAN NOTE
Chronic issue, on miralax and colace at baseline   CT a/p :moderate to large colonic stool burden  Will give dose of Magnesium Citrate and monitor for effect  Dulcolax OH if needed vs enema

## 2025-05-30 VITALS
OXYGEN SATURATION: 94 % | HEART RATE: 87 BPM | TEMPERATURE: 96.6 F | WEIGHT: 194.22 LBS | HEIGHT: 72 IN | SYSTOLIC BLOOD PRESSURE: 158 MMHG | BODY MASS INDEX: 26.31 KG/M2 | RESPIRATION RATE: 18 BRPM | DIASTOLIC BLOOD PRESSURE: 86 MMHG

## 2025-05-30 LAB
ANION GAP SERPL CALCULATED.3IONS-SCNC: 7 MMOL/L (ref 4–13)
BUN SERPL-MCNC: 16 MG/DL (ref 5–25)
CALCIUM SERPL-MCNC: 8.3 MG/DL (ref 8.4–10.2)
CHLORIDE SERPL-SCNC: 109 MMOL/L (ref 96–108)
CO2 SERPL-SCNC: 24 MMOL/L (ref 21–32)
CREAT SERPL-MCNC: 0.66 MG/DL (ref 0.6–1.3)
ERYTHROCYTE [DISTWIDTH] IN BLOOD BY AUTOMATED COUNT: 13.9 % (ref 11.6–15.1)
GFR SERPL CREATININE-BSD FRML MDRD: 106 ML/MIN/1.73SQ M
GLUCOSE SERPL-MCNC: 130 MG/DL (ref 65–140)
HCT VFR BLD AUTO: 29.7 % (ref 36.5–49.3)
HGB BLD-MCNC: 9.4 G/DL (ref 12–17)
MCH RBC QN AUTO: 30.6 PG (ref 26.8–34.3)
MCHC RBC AUTO-ENTMCNC: 31.6 G/DL (ref 31.4–37.4)
MCV RBC AUTO: 97 FL (ref 82–98)
PLATELET # BLD AUTO: 119 THOUSANDS/UL (ref 149–390)
PMV BLD AUTO: 11.6 FL (ref 8.9–12.7)
POTASSIUM SERPL-SCNC: 3.5 MMOL/L (ref 3.5–5.3)
RBC # BLD AUTO: 3.07 MILLION/UL (ref 3.88–5.62)
SODIUM SERPL-SCNC: 140 MMOL/L (ref 135–147)
WBC # BLD AUTO: 6.04 THOUSAND/UL (ref 4.31–10.16)

## 2025-05-30 PROCEDURE — 85027 COMPLETE CBC AUTOMATED: CPT

## 2025-05-30 PROCEDURE — 99239 HOSP IP/OBS DSCHRG MGMT >30: CPT

## 2025-05-30 PROCEDURE — 80048 BASIC METABOLIC PNL TOTAL CA: CPT

## 2025-05-30 RX ADMIN — LEVETIRACETAM 1500 MG: 500 TABLET, FILM COATED ORAL at 09:07

## 2025-05-30 RX ADMIN — CYANOCOBALAMIN TAB 500 MCG 500 MCG: 500 TAB at 09:07

## 2025-05-30 RX ADMIN — FOLIC ACID 1 MG: 1 TABLET ORAL at 09:08

## 2025-05-30 RX ADMIN — DIVALPROEX SODIUM 500 MG: 500 TABLET, DELAYED RELEASE ORAL at 09:08

## 2025-05-30 RX ADMIN — QUETIAPINE FUMARATE 200 MG: 100 TABLET ORAL at 05:34

## 2025-05-30 RX ADMIN — LACOSAMIDE 150 MG: 100 TABLET, FILM COATED ORAL at 09:07

## 2025-05-30 RX ADMIN — LISINOPRIL 5 MG: 5 TABLET ORAL at 09:08

## 2025-05-30 RX ADMIN — QUETIAPINE FUMARATE 200 MG: 100 TABLET ORAL at 13:52

## 2025-05-30 RX ADMIN — LEVOTHYROXINE SODIUM 150 MCG: 0.15 TABLET ORAL at 05:34

## 2025-05-30 RX ADMIN — GABAPENTIN 300 MG: 300 CAPSULE ORAL at 09:00

## 2025-05-30 RX ADMIN — GABAPENTIN 300 MG: 300 CAPSULE ORAL at 13:52

## 2025-05-30 RX ADMIN — SODIUM CHLORIDE, SODIUM LACTATE, POTASSIUM CHLORIDE, AND CALCIUM CHLORIDE 75 ML/HR: .6; .31; .03; .02 INJECTION, SOLUTION INTRAVENOUS at 09:37

## 2025-05-30 NOTE — ASSESSMENT & PLAN NOTE
Chronic issue, on miralax and colace at baseline   CT a/p :moderate to large colonic stool burden  Will give dose of Magnesium Citrate and monitor for effect  Dulcolax MS if needed vs enema

## 2025-05-30 NOTE — PLAN OF CARE
Problem: PAIN - ADULT  Goal: Verbalizes/displays adequate comfort level or baseline comfort level  Description: Interventions:  - Encourage patient to monitor pain and request assistance  - Assess pain using appropriate pain scale  - Administer analgesics as ordered based on type and severity of pain and evaluate response  - Implement non-pharmacological measures as appropriate and evaluate response  - Consider cultural and social influences on pain and pain management  - Notify physician/advanced practitioner if interventions unsuccessful or patient reports new pain  - Educate patient/family on pain management process including their role and importance of  reporting pain   - Provide non-pharmacologic/complimentary pain relief interventions  Outcome: Progressing     Problem: INFECTION - ADULT  Goal: Absence or prevention of progression during hospitalization  Description: INTERVENTIONS:  - Assess and monitor for signs and symptoms of infection  - Monitor lab/diagnostic results  - Monitor all insertion sites, i.e. indwelling lines, tubes, and drains  - Monitor endotracheal if appropriate and nasal secretions for changes in amount and color  - [place] appropriate cooling/warming therapies per order  - Administer medications as ordered  - Instruct and encourage patient and family to use good hand hygiene technique  - Identify and instruct in appropriate isolation precautions for identified infection/condition  Outcome: Progressing  Goal: Absence of fever/infection during neutropenic period  Description: INTERVENTIONS:  - Monitor WBC  - Perform strict hand hygiene  - Limit to healthy visitors only  - No plants, dried, fresh or silk flowers with narayan in patient room  Outcome: Progressing     Problem: SAFETY ADULT  Goal: Patient will remain free of falls  Description: INTERVENTIONS:  - Educate patient/family on patient safety including physical limitations  - Instruct patient to call for assistance with activity   -  Consider consulting OT/PT to assist with strengthening/mobility based on AM PAC & JH-HLM score  - Consult OT/PT to assist with strengthening/mobility   - Keep Call bell within reach  - Keep bed low and locked with side rails adjusted as appropriate  - Keep care items and personal belongings within reach  - Initiate and maintain comfort rounds  - Make Fall Risk Sign visible to staff  - Offer Toileting every    Hours, in advance of need  - Initiate/Maintain   alarm  - Obtain necessary fall risk management equipment:     - Apply yellow socks and bracelet for high fall risk patients  - Consider moving patient to room near nurses station  Outcome: Progressing  Goal: Maintain or return to baseline ADL function  Description: INTERVENTIONS:  -  Assess patient's ability to carry out ADLs; assess patient's baseline for ADL function and identify physical deficits which impact ability to perform ADLs (bathing, care of mouth/teeth, toileting, grooming, dressing, etc.)  - Assess/evaluate cause of self-care deficits   - Assess range of motion  - Assess patient's mobility; develop plan if impaired  - Assess patient's need for assistive devices and provide as appropriate  - Encourage maximum independence but intervene and supervise when necessary  - Involve family in performance of ADLs  - Assess for home care needs following discharge   - Consider OT consult to assist with ADL evaluation and planning for discharge  - Provide patient education as appropriate  - Monitor functional capacity and physical performance, use of AM PAC & JH-HLM   - Monitor gait, balance and fatigue with ambulation    Outcome: Progressing  Goal: Maintains/Returns to pre admission functional level  Description: INTERVENTIONS:  - Perform AM-PAC 6 Click Basic Mobility/ Daily Activity assessment daily.  - Set and communicate daily mobility goal to care team and patient/family/caregiver.   - Collaborate with rehabilitation services on mobility goals if  consulted  - Perform Range of Motion    times a day.  - Reposition patient every    hours.  - Dangle patient      times a day  - Stand patient    times a day  - Ambulate patient    times a day  - Out of bed to chair    times a day   - Out of bed for meals    times a day  - Out of bed for toileting  - Record patient progress and toleration of activity level   Outcome: Progressing     Problem: DISCHARGE PLANNING  Goal: Discharge to home or other facility with appropriate resources  Description: INTERVENTIONS:  - Identify barriers to discharge w/patient and caregiver  - Arrange for needed discharge resources and transportation as appropriate  - Identify discharge learning needs (meds, wound care, etc.)  - Arrange for interpretive services to assist at discharge as needed  - Refer to Case Management Department for coordinating discharge planning if the patient needs post-hospital services based on physician/advanced practitioner order or complex needs related to functional status, cognitive ability, or social support system  Outcome: Progressing     Problem: Knowledge Deficit  Goal: Patient/family/caregiver demonstrates understanding of disease process, treatment plan, medications, and discharge instructions  Description: Complete learning assessment and assess knowledge base.  Interventions:  - Provide teaching at level of understanding  - Provide teaching via preferred learning methods  Outcome: Progressing     Problem: Prexisting or High Potential for Compromised Skin Integrity  Goal: Skin integrity is maintained or improved  Description: INTERVENTIONS:  - Identify patients at risk for skin breakdown  - Assess and monitor skin integrity including under and around medical devices   - Assess and monitor nutrition and hydration status  - Monitor labs  - Assess for incontinence   - Turn and reposition patient  - Assist with mobility/ambulation  - Relieve pressure over monet prominences   - Avoid friction and shearing  -  Provide appropriate hygiene as needed including keeping skin clean and dry  - Evaluate need for skin moisturizer/barrier cream  - Collaborate with interdisciplinary team  - Patient/family teaching  - Consider wound care consult    Assess:  - Review Nolan scale daily  - Clean and moisturize skin every     - Inspect skin when repositioning, toileting, and assisting with ADLS  - Assess under medical devices such as      every     - Assess extremities for adequate circulation and sensation     Bed Management:  - Have minimal linens on bed & keep smooth, unwrinkled  - Change linens as needed when moist or perspiring  - Avoid sitting or lying in one position for more than    hours while in bed?Keep HOB at   degrees   - Toileting:  - Offer bedside commode  - Assess for incontinence every     - Use incontinent care products after each incontinent episode such as         Activity:  - Mobilize patient    times a day  - Encourage activity and walks on unit  - Encourage or provide ROM exercises   - Turn and reposition patient every    Hours  - Use appropriate equipment to lift or move patient in bed  - Instruct/ Assist with weight shifting every    when out of bed in chair  - Consider limitation of chair time    hour intervals    Skin Care:  - Avoid use of baby powder, tape, friction and shearing, hot water or constrictive clothing  - Relieve pressure over bony prominences using     - Do not massage red bony areas    Next Steps:  - Teach patient strategies to minimize risks such as       - Consider consults to  interdisciplinary teams such as   Outcome: Progressing

## 2025-05-30 NOTE — ASSESSMENT & PLAN NOTE
Presents with report of BRBPR following insertion of a foreign body (toy windmill). Foreign body removed however given the trauma  Serial abdominal exams  Reports no current bowel movement or current bleeding  Hemoglobin 9.4 and up trended to 12, down trended to 10, I believe 12 hemoglobin likely in error, hemoglobin remained stable at 9  GI following and if any ongoing bleeding, will likely have a flex sig, will defer at this time given no ongoing evidence of bleeding, patient had 1 bowel movement with bright red blood, no longer having evidence of bleeding

## 2025-05-30 NOTE — CASE MANAGEMENT
Case Management Assessment & Discharge Planning Note    Patient name Jairon Oconnell  Location [address] [address]/2  284-01 MRN [id number]  : 1965 Date 2025       Current Admission Date: 2025  Current Admission Diagnosis:Rectal bleeding   Patient Active Problem List    Diagnosis Date Noted    Rectal foreign body, sequela 2025    Rectal bleeding 2025    Vitamin B12 deficiency (dietary) anemia 2025    Disorders of magnesium metabolism, unspecified 2025    Other folate deficiency anemias 2025    Pure hypercholesterolemia 2025    On long term drug therapy 2025    Impaired fasting glucose 2025    Acute cystitis without hematuria 10/18/2024    Folic acid deficiency 2024    Seasonal allergies 2024    Pain of right great toe 2024    Iron deficiency anemia secondary to inadequate dietary iron intake 2024    Weakness 2024    Sepsis due to Escherichia coli without acute organ dysfunction (HCC) 2024    Acute cystitis with hematuria 2024    Slow transit constipation 07/10/2024    History of pneumothorax 2024    History of cardiac arrest 2024    Dysphagia 2024    Closed traumatic fracture of ribs of right side with pneumothorax 2024    Urinary retention 2024    Nondisplaced fracture of greater trochanter of right femur, subsequent encounter for closed fracture with routine healing 2024    Closed nondisplaced fracture of proximal phalanx of left index finger with routine healing, subsequent encounter 10/30/2023    Pressure ulcer of sacral region, stage 3 (HCC) 2023    Ambulatory dysfunction 2022    Social problem 2021    Hypomagnesemia 2021    Thrombocytopathia (HCC) 2020    Hyponatremia 2019    Metabolic encephalopathy 2019    Seborrheic dermatitis of scalp 2019    Nearsightedness 2019    Behavior concern in adult 2019    Cerebral  paresis with homolateral ataxia (HCC) 08/01/2019    Vitamin B12 deficiency 03/08/2017    Mixed hyperlipidemia 09/16/2016    Vitamin D deficiency 09/16/2016    Type 2 diabetes mellitus with diabetic neuropathy, without long-term current use of insulin (HCC) 06/15/2016    Acquired hypothyroidism 06/15/2016    Cataract 11/22/2013    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    Generalized convulsive epilepsy (HCC) 11/22/2013    Primary hypertension 11/22/2013    Osteoporosis 11/22/2013    Scoliosis 11/22/2013      LOS (days): 1  Geometric Mean LOS (GMLOS) (days):   Days to GMLOS:     OBJECTIVE:    Risk of Unplanned Readmission Score: 21.89         Current admission status: Inpatient       Preferred Pharmacy:   [facility] [place] - [address]  [address] [place] [address]  Phone: [phone] Fax: [phone]    [id number] - [place] - [address]  [address]  [place] [address]  Phone: [phone] Fax: [phone]    Primary Care Provider: HUNTER Rose    Primary Insurance: MEDICARE  Secondary Insurance: [place] [employer]    ASSESSMENT:  Active Health Care Proxies       Kemi Decker Health Care Representative - Sister   Primary Phone: [phone] (Home)                 Advance Directives  Does patient have a Health Care POA?: No  Was patient offered paperwork?: Yes (declined;  caretaker reports sister is working on documentation)  Does patient have Advance Directives?: No  Was patient offered paperwork?: Yes (declined; caretaker reports sister is working on documentation)  Primary Contact: Kemi Decker (Sister)  [phone] (Home Phone)         Readmission Root Cause  30 Day Readmission: No    Patient Information  Admitted from:: Home  Mental Status: Alert  During Assessment patient was accompanied by: Other-Comment (caregiver Betty Huffman)  Assessment information provided by:: Other - please comment (caregiver  "Betty Huffman)  Primary Caregiver: Self  Support Systems: Other (Comment) (private caregivers)  County of Residence: [place]  What [place] do you live in[place]  Home entry access options. Select all that apply.: Ramp  Type of Current Residence: 2 story home  Upon entering residence, is there a bedroom on the main floor (no further steps)?: Yes  Upon entering residence, is there a bathroom on the main floor (no further steps)?: Yes  Living Arrangements: Other (Comment) (pt resides on 1st floor of 2 story house;  caregivers Betty and Arun Huffman live on 2nd floor)  Is patient a ?: No    Activities of Daily Living Prior to Admission  Functional Status: Assistance  Completes ADLs independently?: No  Level of ADL dependence: Assistance  Ambulates independently?: Yes  Does patient use assisted devices?: Yes  Assisted Devices (DME) used: Bedside Commode, Shower Chair, Other (Comment) (glucometer)  Does patient currently own DME?: Yes  What DME does the patient currently own?: Bedside Commode, Shower Chair, Wheelchair, Walker, Other (Comment) (glucometer)  Does patient currently have HHC?: No         Patient Information Continued  Income Source: SSI/SSD  Does patient have prescription coverage?: Yes  Can the patient afford their medications and any related supplies (such as glucometers or test strips)?: Yes  Does patient receive dialysis treatments?: No  Does patient have a history of substance abuse?: No  Does patient have a history of Mental Health Diagnosis?: Yes (\"behavioral issues\" per chart;  'explosive anger disorder\" per caregiver)  Is patient receiving treatment for mental health?: Yes (psychiatrist and meds)         Means of Transportation  Means of Transport to [facility]:: Other (Comment) (caregivers)      Social Determinants of Health (SDOH)      Flowsheet Row Most Recent Value   Housing Stability    In the last 12 months, was there a time when you were not able to pay the mortgage or rent on " time? N   In the past 12 months, how many times have you moved where you were living? 0   At any time in the past 12 months, were you homeless or living in a shelter (including now)? N   Transportation Needs    In the past 12 months, has lack of transportation kept you from meetings, work, or from getting things needed for daily living? No   Food Insecurity    Within the past 12 months, you worried that your food would run out before you got the money to buy more. Never true   Within the past 12 months, the food you bought just didn't last and you didn't have money to get more. Never true   Utilities    In the past 12 months has the electric, gas, oil, or water company threatened to shut off services in your home? No            DISCHARGE DETAILS:    Discharge planning discussed with:: pt's caregiver Betty at pt's bedside  Freedom of Choice: Yes  Comments - Freedom of Choice: Met w pt and caregiver at bedside; introduced self and explained role of CM, including arranging DME, STR, home health, out pt tx.  Advised pt/caregiver that CM will make appropriate referrals based on treatment team recommendations and MD orders, and they can consider recommended plan of care and choose from available vendors.  CM contacted family/caregiver?: Yes (none at present)             Contacts  Patient Contacts: betty Huffman  [phone]      caregiver  Relationship to Patient:: Treatment Provider  Contact Method: Phone  Phone Number: Nathanaelbetty  [phone]  Reason/Outcome: Continuity of Care, Emergency Contact, Discharge Planning    Requested Home Health Care         Is the patient interested in HHC at discharge?: No    DME Referral Provided  Referral made for DME?: No    Other Referral/Resources/Interventions Provided:  Referral Comments: Case discussed at AM SLIM rounds today.  Pt anticipated d/c today;  no post acute needs identified by tx team.  Caregivers and pt share 2 story house w ramp;  pt lives on 1st floor,  couple live on 2nd floor.  Suzy are employed by [employer] ([facility]= [phone]).  Pt and caregiver feel he does not need skilled services and would not want home health referral.  Pt has AM PAC of 18.  CM will continue to follow through to d/c.         Treatment Team Recommendation: Home  Discharge Destination Plan:: Home  Transport at Discharge : Other (Comment) (caregiver Betty Nathanael)

## 2025-05-30 NOTE — ASSESSMENT & PLAN NOTE
Lab Results   Component Value Date    HGBA1C 6.7 (H) 05/28/2025   Blood Sugar Average: Last 72 hrs (P) 138.5  Well-controlled a/e/b A1c 6.4%, will update this admission  Hold home med [metformin]   Humalog SSI # 2   [facility] diet   Blood glucose monitoring q6h while NPO   Hypoglycemia protocol

## 2025-05-30 NOTE — DISCHARGE SUMMARY
Discharge Summary - Hospitalist   Name: Jairon Oconnell 60 y.o. male I MRN: [id number]  Unit/Bed#: 2 E 284-01 I Date of Admission: 5/28/2025   Date of Service: 5/30/2025 I Hospital Day: 1     Assessment & Plan  Rectal bleeding  Presents with report of BRBPR following insertion of a foreign body (toy windmill). Foreign body removed however given the trauma  Serial abdominal exams  Reports no current bowel movement or current bleeding  Hemoglobin 9.4 and up trended to 12, down trended to 10, I believe 12 hemoglobin likely in error, hemoglobin remained stable at 9  GI following and if any ongoing bleeding, will likely have a flex sig, will defer at this time given no ongoing evidence of bleeding, patient had 1 bowel movement with bright red blood, no longer having evidence of bleeding    Rectal foreign body, sequela  Monitor patient, maintain safe enviorment   Type 2 diabetes mellitus with diabetic neuropathy, without long-term current use of insulin (HCC)  Lab Results   Component Value Date    HGBA1C 6.7 (H) 05/28/2025   Blood Sugar Average: Last 72 hrs (P) 138.5  Well-controlled a/e/b A1c 6.4%, will update this admission  Hold home med [metformin]   Humalog SSI # 2   [facility] diet   Blood glucose monitoring q6h while NPO   Hypoglycemia protocol  Ataxic cerebral palsy (HCC)  History noted, fall precautions  Baseline stand pivot, frequent napping  Generalized convulsive epilepsy (HCC)  History noted, no recent seizure activity  Continue home AEDs:   Depakote 500 mg am, 1000 mg pm  Vimpat 150 mg am, 200 mg pm  Keppra 1500 mg bid  Zonegran 300 mg pm  Seizure precautions  Primary hypertension  Chronic, Monitor VS per unit protocol   Continue home dose lisinopril 5 mg daily   Acquired hypothyroidism  Chronic, last TSH visible is from 20204 and elevated with low FT4  Update TSH in the outpatient setting  Continue levothyroxine 150 mcg daily  Slow transit constipation  Chronic issue, on miralax and colace at baseline   CT a/p  ":moderate to large colonic stool burden  Will give dose of Magnesium Citrate and monitor for effect  Dulcolax MN if needed vs enema  Behavior concern in adult  Monitor mood and behaviors, required haldol in ER for agitation  Continue Seroquel 200 mg am, mid-day + Seroquel  mg pm     Discharging Physician / Practitioner: Ancelmo Mota PA-C  PCP: HUNTER Rose  Admission Date:   Admission Orders (From admission, onward)       Ordered        05/29/25 1415  INPATIENT ADMISSION  Once            05/28/25 1533  Place in Observation  Once                          Discharge Date: 05/30/25    Consultations During Hospital Stay:  IP CONSULT TO GASTROENTEROLOGY    Procedures Performed:   None    Significant Findings / Test Results:   CT abdomen pelvis with contrast  Result Date: 5/28/2025  Impression: 1.  No acute abnormality in the abdomen or pelvis. No evidence of pneumoperitoneum or retained rectal foreign body. 2.  Moderate to large colonic stool burden. 3.  Nonobstructing left renal stones. Workstation performed: [id number]       Results for orders placed during the hospital encounter of 06/03/24    Echo complete w/ contrast if indicated    Interpretation Summary    Technically difficult study.    Left Ventricle: Left ventricular cavity size is normal. Wall thickness is mildly increased. There is mild concentric hypertrophy. The left ventricular ejection fraction is 55%. Systolic function is normal. Wall motion cannot be accurately assessed. Diastolic function is normal.    Tricuspid Valve: There is trace regurgitation. The right ventricular systolic pressure is normal.      No results for input(s): \"BLOODCX\", \"SPUTUMCULTUR\", \"GRAMSTAIN\", \"URINECX\", \"WOUNDCULT\", \"BODYFLUIDCUL\", \"MRSACULTURE\", \"INFLUAPCR\", \"INFLUBPCR\", \"RSVPCR\", \"LEGIONELLAUR\", \"STPU\", \"CDIFFTOXINB\" in the last 72 hours.    Incidental Findings:   None other than noted above; I reviewed the above mentioned incidental findings with the " patient and/or family and they expressed understanding.    Test Results Pending at Discharge (will require follow up):   None     Outpatient Tests Requested:  None    Complications:  None    Reason for Admission: Rectal Bleeding (Woke up with blood in his bed and on his buttocks, per caregiver, patients sister gave him a toy windmill and he stuck it up his rectum, per caregiver the plastic windmill was removed and intact. )       Hospital Course:   Jairon Oconnell is a 60 y.o. male with a PMH of cerepral palsy, depression, diabetes, hypertension, nephrolithiasis, seizures, hypothyroidism, psychiatric disorder, intellectual disability, and limited verbal communication who presents with rectal bleeding secondary to insertion of foreign body into rectum. Per caregiver, object was removed intact, however patient had bleeding and thus evaluation was sought. ER did speak to GI who is recommending observation for inpatient scope to evaluate for any trauma requiring intervention, though CT scan does not identify bowel perforation and notes moderate to large colonic stool burden and non-obstructing left nephrolithiasis.  Patient was then evaluated with conservative treatment and thankfully did not require any kind of endoscopic procedure.  He had 1 episode of bright red blood per rectum however this was a singular occurrence.  Patient's hemoglobin was monitored and remained stable between 9 and 10.  At this time patient is medically stable for discharge and caregivers and patient are agreeable for plan to discharge.        Please see above list of diagnoses and related plan for additional information.     Condition at Discharge: good    Discharge Day Visit / Exam:   Subjective: Patient reports to be feeling well.  Offers no complaints at this time.  Offers no new complaints at this time. No acute events reported overnight. Understanding of plan.  All questions answered to the best of my ability at this time to patient  satisfaction. Plan of care agreed upon.  Vitals: Blood Pressure: 158/86 (05/30/25 0752)  Pulse: 87 (05/29/25 1455)  Temperature: (!) 96.6 °F (35.9 °C) (05/30/25 0752)  Temp Source: Oral (05/29/25 1455)  Respirations: 18 (05/30/25 0752)  Height: 6' (182.9 cm) (05/29/25 0900)  Weight - Scale: 88.1 kg (194 lb 3.6 oz) (05/29/25 0900)  SpO2: 94 % (05/29/25 1455)  Exam:   Physical Exam  Vitals and nursing note reviewed.   Constitutional:       General: He is not in acute distress.     Appearance: He is normal weight. He is not ill-appearing, toxic-appearing or diaphoretic.   HENT:      Head: Normocephalic.      Nose: Nose normal.      Mouth/Throat:      Mouth: Mucous membranes are moist.      Pharynx: Oropharynx is clear.     Eyes:      General: No scleral icterus.     Conjunctiva/sclera: Conjunctivae normal.      Pupils: Pupils are equal, round, and reactive to light.       Cardiovascular:      Rate and Rhythm: Normal rate and regular rhythm.      Heart sounds: No murmur heard.     No friction rub. No gallop.   Pulmonary:      Effort: Pulmonary effort is normal. No respiratory distress.      Breath sounds: Normal breath sounds. No stridor. No wheezing, rhonchi or rales.   Abdominal:      General: Abdomen is flat.      Palpations: Abdomen is soft.     Musculoskeletal:         General: Normal range of motion.      Cervical back: Normal range of motion and neck supple.      Right lower leg: No edema.      Left lower leg: No edema.   Lymphadenopathy:      Cervical: No cervical adenopathy.     Skin:     General: Skin is warm.      Coloration: Skin is not jaundiced or pale.      Findings: No bruising, erythema or lesion.     Neurological:      General: No focal deficit present.      Mental Status: He is alert. Mental status is at baseline.      Cranial Nerves: No cranial nerve deficit.      Motor: No weakness.     Psychiatric:         Mood and Affect: Mood normal.         Behavior: Behavior normal.         Thought Content:  Thought content normal.          Discussion with Family: Updated  (caregiver) via phone.    Discharge instructions/Information to patient and family:   See after visit summary for information provided to patient and family.      Provisions for Follow-Up Care:  See after visit summary for information related to follow-up care and any pertinent home health orders.       Mobility at time of Discharge:   Basic Mobility Inpatient Raw Score: 18  JH-HLM Goal: 6: Walk 10 steps or more  JH-HLM Achieved: 5: Stand (1 or more minutes)  HLM Goal achieved. Continue to encourage appropriate mobility.    Disposition:   Home with VNA Services (Reminder: Complete face to face encounter)    Planned Readmission: none     Discharge Statement:  I spent 65 minutes discharging the patient. This time was spent on the day of discharge. I had direct contact with the patient on the day of discharge. Greater than 50% of the total time was spent examining patient, answering all patient questions, arranging and discussing plan of care with patient as well as directly providing post-discharge instructions.  Additional time then spent on discharge activities.    Discharge Medications:  See after visit summary for reconciled discharge medications provided to patient and/or family.      **Please Note: This note may have been constructed using a voice recognition system**

## 2025-05-30 NOTE — ASSESSMENT & PLAN NOTE
Chronic, last TSH visible is from 20204 and elevated with low FT4  Update TSH in the outpatient setting  Continue levothyroxine 150 mcg daily

## 2025-06-02 ENCOUNTER — TRANSITIONAL CARE MANAGEMENT (OUTPATIENT)
Dept: FAMILY MEDICINE CLINIC | Facility: CLINIC | Age: 60
End: 2025-06-02

## 2025-06-02 ENCOUNTER — TELEPHONE (OUTPATIENT)
Dept: FAMILY MEDICINE CLINIC | Facility: CLINIC | Age: 60
End: 2025-06-02

## 2025-06-03 ENCOUNTER — OFFICE VISIT (OUTPATIENT)
Dept: FAMILY MEDICINE CLINIC | Facility: CLINIC | Age: 60
End: 2025-06-03
Payer: MEDICARE

## 2025-06-03 ENCOUNTER — APPOINTMENT (OUTPATIENT)
Dept: LAB | Facility: CLINIC | Age: 60
End: 2025-06-03
Payer: MEDICARE

## 2025-06-03 VITALS
BODY MASS INDEX: 26.01 KG/M2 | OXYGEN SATURATION: 98 % | HEART RATE: 90 BPM | TEMPERATURE: 97.6 F | WEIGHT: 192 LBS | DIASTOLIC BLOOD PRESSURE: 64 MMHG | RESPIRATION RATE: 16 BRPM | HEIGHT: 72 IN | SYSTOLIC BLOOD PRESSURE: 126 MMHG

## 2025-06-03 DIAGNOSIS — G80.4 ATAXIC CEREBRAL PALSY (HCC): Chronic | ICD-10-CM

## 2025-06-03 DIAGNOSIS — E03.9 ACQUIRED HYPOTHYROIDISM: ICD-10-CM

## 2025-06-03 DIAGNOSIS — D52.8 OTHER FOLATE DEFICIENCY ANEMIAS: Chronic | ICD-10-CM

## 2025-06-03 DIAGNOSIS — E06.3 HYPOTHYROIDISM DUE TO HASHIMOTO'S THYROIDITIS: ICD-10-CM

## 2025-06-03 DIAGNOSIS — I10 HYPERTENSION, UNSPECIFIED TYPE: ICD-10-CM

## 2025-06-03 DIAGNOSIS — E78.00 PURE HYPERCHOLESTEROLEMIA: ICD-10-CM

## 2025-06-03 DIAGNOSIS — E83.40 DISORDERS OF MAGNESIUM METABOLISM, UNSPECIFIED: ICD-10-CM

## 2025-06-03 DIAGNOSIS — J30.89 ENVIRONMENTAL AND SEASONAL ALLERGIES: ICD-10-CM

## 2025-06-03 DIAGNOSIS — I10 PRIMARY HYPERTENSION: Chronic | ICD-10-CM

## 2025-06-03 DIAGNOSIS — G62.9 NEUROPATHY: ICD-10-CM

## 2025-06-03 DIAGNOSIS — Z00.00 MEDICARE ANNUAL WELLNESS VISIT, SUBSEQUENT: ICD-10-CM

## 2025-06-03 DIAGNOSIS — E11.40 TYPE 2 DIABETES MELLITUS WITH DIABETIC NEUROPATHY, WITHOUT LONG-TERM CURRENT USE OF INSULIN (HCC): Chronic | ICD-10-CM

## 2025-06-03 DIAGNOSIS — N32.89 BLADDER DISTENTION: ICD-10-CM

## 2025-06-03 DIAGNOSIS — D50.8 IRON DEFICIENCY ANEMIA SECONDARY TO INADEQUATE DIETARY IRON INTAKE: ICD-10-CM

## 2025-06-03 DIAGNOSIS — Z13.29 SCREENING FOR THYROID DISORDER: ICD-10-CM

## 2025-06-03 DIAGNOSIS — E78.2 MIXED HYPERLIPIDEMIA: Chronic | ICD-10-CM

## 2025-06-03 DIAGNOSIS — E08.00 DIABETES MELLITUS DUE TO UNDERLYING CONDITION WITH HYPEROSMOLARITY WITHOUT COMA, WITHOUT LONG-TERM CURRENT USE OF INSULIN (HCC): ICD-10-CM

## 2025-06-03 DIAGNOSIS — Z00.00 ANNUAL PHYSICAL EXAM: ICD-10-CM

## 2025-06-03 DIAGNOSIS — D50.8 IRON DEFICIENCY ANEMIA SECONDARY TO INADEQUATE DIETARY IRON INTAKE: Chronic | ICD-10-CM

## 2025-06-03 DIAGNOSIS — Z11.59 ENCOUNTER FOR SCREENING FOR OTHER VIRAL DISEASES: ICD-10-CM

## 2025-06-03 DIAGNOSIS — D51.8 VITAMIN B12 DEFICIENCY (DIETARY) ANEMIA: ICD-10-CM

## 2025-06-03 DIAGNOSIS — R33.9 URINARY RETENTION: ICD-10-CM

## 2025-06-03 DIAGNOSIS — Z12.11 ENCOUNTER FOR SCREENING FOR MALIGNANT NEOPLASM OF COLON: ICD-10-CM

## 2025-06-03 DIAGNOSIS — E53.8 VITAMIN B12 DEFICIENCY: Chronic | ICD-10-CM

## 2025-06-03 DIAGNOSIS — Z00.00 MEDICARE ANNUAL WELLNESS VISIT, SUBSEQUENT: Primary | Chronic | ICD-10-CM

## 2025-06-03 DIAGNOSIS — Z12.5 SCREENING FOR PROSTATE CANCER: ICD-10-CM

## 2025-06-03 DIAGNOSIS — E83.42 HYPOMAGNESEMIA: Chronic | ICD-10-CM

## 2025-06-03 DIAGNOSIS — Z99.11 DEPENDENCE ON RESPIRATOR (VENTILATOR) STATUS (HCC): ICD-10-CM

## 2025-06-03 DIAGNOSIS — R94.6 ABNORMAL RESULTS OF THYROID FUNCTION STUDIES: ICD-10-CM

## 2025-06-03 DIAGNOSIS — Z12.5 SCREENING FOR MALIGNANT NEOPLASM OF PROSTATE: ICD-10-CM

## 2025-06-03 DIAGNOSIS — K59.01 SLOW TRANSIT CONSTIPATION: Chronic | ICD-10-CM

## 2025-06-03 DIAGNOSIS — E78.5 HYPERLIPIDEMIA, UNSPECIFIED HYPERLIPIDEMIA TYPE: ICD-10-CM

## 2025-06-03 DIAGNOSIS — G20.C PARKINSONIAN TREMOR (HCC): ICD-10-CM

## 2025-06-03 DIAGNOSIS — Z79.899 ON LONG TERM DRUG THERAPY: ICD-10-CM

## 2025-06-03 DIAGNOSIS — Z51.81 ENCOUNTER FOR THERAPEUTIC DRUG LEVEL MONITORING: Chronic | ICD-10-CM

## 2025-06-03 DIAGNOSIS — N39.0 RECURRENT UTI: ICD-10-CM

## 2025-06-03 DIAGNOSIS — G93.41 METABOLIC ENCEPHALOPATHY: ICD-10-CM

## 2025-06-03 DIAGNOSIS — E53.8 FOLIC ACID DEFICIENCY: ICD-10-CM

## 2025-06-03 DIAGNOSIS — E55.9 VITAMIN D DEFICIENCY: Chronic | ICD-10-CM

## 2025-06-03 DIAGNOSIS — R73.03 PREDIABETES: ICD-10-CM

## 2025-06-03 DIAGNOSIS — R56.9 SEIZURE (HCC): ICD-10-CM

## 2025-06-03 DIAGNOSIS — L89.153 PRESSURE ULCER OF SACRAL REGION, STAGE 3 (HCC): ICD-10-CM

## 2025-06-03 DIAGNOSIS — Z13.1 SCREENING FOR DIABETES MELLITUS: ICD-10-CM

## 2025-06-03 DIAGNOSIS — K59.00 CONSTIPATION, UNSPECIFIED CONSTIPATION TYPE: ICD-10-CM

## 2025-06-03 DIAGNOSIS — Z01.84 IMMUNITY TO HEPATITIS B VIRUS DEMONSTRATED BY SEROLOGIC TEST: Chronic | ICD-10-CM

## 2025-06-03 DIAGNOSIS — G40.309 GENERALIZED CONVULSIVE EPILEPSY (HCC): Chronic | ICD-10-CM

## 2025-06-03 DIAGNOSIS — D69.1 THROMBOCYTOPATHIA (HCC): ICD-10-CM

## 2025-06-03 DIAGNOSIS — R73.01 IMPAIRED FASTING GLUCOSE: ICD-10-CM

## 2025-06-03 DIAGNOSIS — E55.9 VITAMIN D DEFICIENCY: ICD-10-CM

## 2025-06-03 PROBLEM — S32.302D: Chronic | Status: ACTIVE | Noted: 2025-06-03

## 2025-06-03 PROBLEM — Z87.19 HISTORY OF RECTAL BLEEDING: Status: ACTIVE | Noted: 2025-06-03

## 2025-06-03 PROBLEM — S32.302D: Status: ACTIVE | Noted: 2025-06-03

## 2025-06-03 PROBLEM — N20.0 LEFT RENAL STONE: Status: ACTIVE | Noted: 2025-06-03

## 2025-06-03 PROBLEM — E87.1 HYPONATREMIA: Status: RESOLVED | Noted: 2019-09-28 | Resolved: 2025-06-03

## 2025-06-03 PROBLEM — M87.059 AVASCULAR NECROSIS OF FEMORAL HEAD (HCC): Chronic | Status: ACTIVE | Noted: 2025-06-03

## 2025-06-03 PROBLEM — N20.0 LEFT RENAL STONE: Chronic | Status: ACTIVE | Noted: 2025-06-03

## 2025-06-03 PROBLEM — Z87.19 HISTORY OF RECTAL BLEEDING: Chronic | Status: ACTIVE | Noted: 2025-06-03

## 2025-06-03 PROBLEM — M87.059 AVASCULAR NECROSIS OF FEMORAL HEAD (HCC): Status: ACTIVE | Noted: 2025-06-03

## 2025-06-03 LAB
25(OH)D3 SERPL-MCNC: 28.4 NG/ML (ref 30–100)
ALBUMIN SERPL BCG-MCNC: 4.2 G/DL (ref 3.5–5)
ALP SERPL-CCNC: 47 U/L (ref 34–104)
ALT SERPL W P-5'-P-CCNC: 23 U/L (ref 7–52)
ANION GAP SERPL CALCULATED.3IONS-SCNC: 8 MMOL/L (ref 4–13)
AST SERPL W P-5'-P-CCNC: 16 U/L (ref 13–39)
BACTERIA UR QL AUTO: NORMAL /HPF
BASOPHILS # BLD AUTO: 0.07 THOUSANDS/ÂΜL (ref 0–0.1)
BASOPHILS NFR BLD AUTO: 1 % (ref 0–1)
BILIRUB SERPL-MCNC: 0.25 MG/DL (ref 0.2–1)
BILIRUB UR QL STRIP: NEGATIVE
BILIRUB UR QL STRIP: NEGATIVE
BUN SERPL-MCNC: 18 MG/DL (ref 5–25)
CALCIUM SERPL-MCNC: 9.5 MG/DL (ref 8.4–10.2)
CHLORIDE SERPL-SCNC: 105 MMOL/L (ref 96–108)
CHOLEST SERPL-MCNC: 180 MG/DL (ref ?–200)
CLARITY UR: CLEAR
CLARITY UR: CLEAR
CO2 SERPL-SCNC: 27 MMOL/L (ref 21–32)
COLOR UR: NORMAL
COLOR UR: NORMAL
CREAT SERPL-MCNC: 0.7 MG/DL (ref 0.6–1.3)
CREAT UR-MCNC: 60 MG/DL
EOSINOPHIL # BLD AUTO: 0.17 THOUSAND/ÂΜL (ref 0–0.61)
EOSINOPHIL NFR BLD AUTO: 3 % (ref 0–6)
ERYTHROCYTE [DISTWIDTH] IN BLOOD BY AUTOMATED COUNT: 14.5 % (ref 11.6–15.1)
EST. AVERAGE GLUCOSE BLD GHB EST-MCNC: 137 MG/DL
FERRITIN SERPL-MCNC: 13 NG/ML (ref 30–336)
FOLATE SERPL-MCNC: >22.3 NG/ML
GFR SERPL CREATININE-BSD FRML MDRD: 104 ML/MIN/1.73SQ M
GLUCOSE SERPL-MCNC: 137 MG/DL (ref 65–140)
GLUCOSE UR STRIP-MCNC: NEGATIVE MG/DL
GLUCOSE UR STRIP-MCNC: NEGATIVE MG/DL
HBA1C MFR BLD: 6.4 %
HBV SURFACE AG SER QL: NORMAL
HCT VFR BLD AUTO: 34.6 % (ref 36.5–49.3)
HDLC SERPL-MCNC: 60 MG/DL
HGB BLD-MCNC: 11.3 G/DL (ref 12–17)
HGB UR QL STRIP.AUTO: NEGATIVE
HGB UR QL STRIP.AUTO: NEGATIVE
IMM GRANULOCYTES # BLD AUTO: 0.07 THOUSAND/UL (ref 0–0.2)
IMM GRANULOCYTES NFR BLD AUTO: 1 % (ref 0–2)
IRON SATN MFR SERPL: 14 % (ref 15–50)
IRON SERPL-MCNC: 56 UG/DL (ref 50–212)
KETONES UR STRIP-MCNC: NEGATIVE MG/DL
KETONES UR STRIP-MCNC: NEGATIVE MG/DL
LACOSAMIDE SERPL-MCNC: 7.55 UG/ML (ref 1–20)
LDLC SERPL CALC-MCNC: 74 MG/DL (ref 0–100)
LEUKOCYTE ESTERASE UR QL STRIP: NEGATIVE
LEUKOCYTE ESTERASE UR QL STRIP: NEGATIVE
LEVETIRACETAM SERPL-MCNC: 35.4 UG/ML (ref 12–46)
LYMPHOCYTES # BLD AUTO: 2.54 THOUSANDS/ÂΜL (ref 0.6–4.47)
LYMPHOCYTES NFR BLD AUTO: 41 % (ref 14–44)
MAGNESIUM SERPL-MCNC: 1.6 MG/DL (ref 1.9–2.7)
MCH RBC QN AUTO: 32.1 PG (ref 26.8–34.3)
MCHC RBC AUTO-ENTMCNC: 32.7 G/DL (ref 31.4–37.4)
MCV RBC AUTO: 98 FL (ref 82–98)
MICROALBUMIN UR-MCNC: <7 MG/L
MONOCYTES # BLD AUTO: 0.77 THOUSAND/ÂΜL (ref 0.17–1.22)
MONOCYTES NFR BLD AUTO: 13 % (ref 4–12)
NEUTROPHILS # BLD AUTO: 2.48 THOUSANDS/ÂΜL (ref 1.85–7.62)
NEUTS SEG NFR BLD AUTO: 41 % (ref 43–75)
NITRITE UR QL STRIP: NEGATIVE
NITRITE UR QL STRIP: NEGATIVE
NON-SQ EPI CELLS URNS QL MICRO: NORMAL /HPF
NRBC BLD AUTO-RTO: 1 /100 WBCS
PH UR STRIP.AUTO: 6.5 [PH]
PH UR STRIP.AUTO: 6.5 [PH]
PLATELET # BLD AUTO: 224 THOUSANDS/UL (ref 149–390)
PMV BLD AUTO: 11.7 FL (ref 8.9–12.7)
POTASSIUM SERPL-SCNC: 3.9 MMOL/L (ref 3.5–5.3)
PROT SERPL-MCNC: 6.9 G/DL (ref 6.4–8.4)
PROT UR STRIP-MCNC: NEGATIVE MG/DL
PROT UR STRIP-MCNC: NEGATIVE MG/DL
PSA SERPL-MCNC: 2.17 NG/ML (ref 0–4)
RBC # BLD AUTO: 3.52 MILLION/UL (ref 3.88–5.62)
RBC #/AREA URNS AUTO: NORMAL /HPF
SODIUM SERPL-SCNC: 140 MMOL/L (ref 135–147)
SP GR UR STRIP.AUTO: 1.02 (ref 1–1.03)
SP GR UR STRIP.AUTO: 1.02 (ref 1–1.03)
TIBC SERPL-MCNC: 406 UG/DL (ref 250–450)
TRANSFERRIN SERPL-MCNC: 290 MG/DL (ref 203–362)
TRIGL SERPL-MCNC: 231 MG/DL (ref ?–150)
TSH SERPL DL<=0.05 MIU/L-ACNC: 3.82 UIU/ML (ref 0.45–4.5)
UIBC SERPL-MCNC: 350 UG/DL (ref 155–355)
UROBILINOGEN UR STRIP-ACNC: <2 MG/DL
UROBILINOGEN UR STRIP-ACNC: <2 MG/DL
VALPROATE SERPL-MCNC: 71 UG/ML (ref 50–125)
VIT B12 SERPL-MCNC: 805 PG/ML (ref 180–914)
WBC # BLD AUTO: 6.1 THOUSAND/UL (ref 4.31–10.16)
WBC #/AREA URNS AUTO: NORMAL /HPF

## 2025-06-03 PROCEDURE — G0103 PSA SCREENING: HCPCS

## 2025-06-03 PROCEDURE — 80061 LIPID PANEL: CPT

## 2025-06-03 PROCEDURE — 83735 ASSAY OF MAGNESIUM: CPT

## 2025-06-03 PROCEDURE — 86480 TB TEST CELL IMMUN MEASURE: CPT

## 2025-06-03 PROCEDURE — 83540 ASSAY OF IRON: CPT

## 2025-06-03 PROCEDURE — 85025 COMPLETE CBC W/AUTO DIFF WBC: CPT

## 2025-06-03 PROCEDURE — 82746 ASSAY OF FOLIC ACID SERUM: CPT

## 2025-06-03 PROCEDURE — 82728 ASSAY OF FERRITIN: CPT

## 2025-06-03 PROCEDURE — 84443 ASSAY THYROID STIM HORMONE: CPT

## 2025-06-03 PROCEDURE — 80053 COMPREHEN METABOLIC PANEL: CPT

## 2025-06-03 PROCEDURE — G0439 PPPS, SUBSEQ VISIT: HCPCS | Performed by: NURSE PRACTITIONER

## 2025-06-03 PROCEDURE — 83036 HEMOGLOBIN GLYCOSYLATED A1C: CPT

## 2025-06-03 PROCEDURE — 87340 HEPATITIS B SURFACE AG IA: CPT

## 2025-06-03 PROCEDURE — 80164 ASSAY DIPROPYLACETIC ACD TOT: CPT

## 2025-06-03 PROCEDURE — 82043 UR ALBUMIN QUANTITATIVE: CPT

## 2025-06-03 PROCEDURE — 82570 ASSAY OF URINE CREATININE: CPT

## 2025-06-03 PROCEDURE — 81003 URINALYSIS AUTO W/O SCOPE: CPT

## 2025-06-03 PROCEDURE — 82607 VITAMIN B-12: CPT

## 2025-06-03 PROCEDURE — 82306 VITAMIN D 25 HYDROXY: CPT

## 2025-06-03 PROCEDURE — 83550 IRON BINDING TEST: CPT

## 2025-06-03 PROCEDURE — 83520 IMMUNOASSAY QUANT NOS NONAB: CPT

## 2025-06-03 PROCEDURE — 80235 DRUG ASSAY LACOSAMIDE: CPT

## 2025-06-03 PROCEDURE — 80203 DRUG SCREEN QUANT ZONISAMIDE: CPT

## 2025-06-03 PROCEDURE — 36415 COLL VENOUS BLD VENIPUNCTURE: CPT

## 2025-06-03 RX ORDER — HYDROXYZINE PAMOATE 25 MG/1
25 CAPSULE ORAL EVERY 12 HOURS
COMMUNITY
Start: 2025-05-29

## 2025-06-03 RX ORDER — SELENIUM 50 MCG
1 TABLET ORAL DAILY
COMMUNITY

## 2025-06-03 RX ORDER — BENZTROPINE MESYLATE 0.5 MG/1
0.5 TABLET ORAL 2 TIMES DAILY
COMMUNITY
Start: 2025-05-29 | End: 2025-06-03

## 2025-06-03 NOTE — PATIENT INSTRUCTIONS
Medicare Preventive Visit Patient Instructions  Thank you for completing your Welcome to Medicare Visit or Medicare Annual Wellness Visit today. Your next wellness visit will be due in one year (6/4/2026).  The screening/preventive services that you may require over the next 5-10 years are detailed below. Some tests may not apply to you based off risk factors and/or age. Screening tests ordered at today's visit but not completed yet may show as past due. Also, please note that scanned in results may not display below.  Preventive Screenings:  Service Recommendations Previous Testing/Comments   Colorectal Cancer Screening  Colonoscopy    Fecal Occult Blood Test (FOBT)/Fecal Immunochemical Test (FIT)  Fecal DNA/Cologuard Test  Flexible Sigmoidoscopy Age: 45-75 years old   Colonoscopy: every 10 years (May be performed more frequently if at higher risk)  OR  FOBT/FIT: every 1 year  OR  Cologuard: every 3 years  OR  Sigmoidoscopy: every 5 years  Screening may be recommended earlier than age 45 if at higher risk for colorectal cancer. Also, an individualized decision between you and your healthcare provider will decide whether screening between the ages of 76-85 would be appropriate. Colonoscopy: Not on file  FOBT/FIT: Not on file  Cologuard: Not on file  Sigmoidoscopy: Not on file          Prostate Cancer Screening Individualized decision between patient and health care provider in men between ages of 55-69   Medicare will cover every 12 months beginning on the day after your 50th birthday PSA: 0.7 ng/mL           Hepatitis C Screening Once for adults born between 1945 and 1965  More frequently in patients at high risk for Hepatitis C Hep C Antibody: 07/07/2022    Screening Current   Diabetes Screening 1-2 times per year if you're at risk for diabetes or have pre-diabetes Fasting glucose: 145 mg/dL (5/29/2025)  A1C: 6.7 % (5/28/2025)  Screening Not Indicated  History Diabetes   Cholesterol Screening Once every 5 years if  you don't have a lipid disorder. May order more often based on risk factors. Lipid panel: 08/20/2024  Screening Not Indicated  History Lipid Disorder      Other Preventive Screenings Covered by Medicare:  Abdominal Aortic Aneurysm (AAA) Screening: covered once if your at risk. You're considered to be at risk if you have a family history of AAA or a male between the age of 65-75 who smoking at least 100 cigarettes in your lifetime.  Lung Cancer Screening: covers low dose CT scan once per year if you meet all of the following conditions: (1) Age 55-77; (2) No signs or symptoms of lung cancer; (3) Current smoker or have quit smoking within the last 15 years; (4) You have a tobacco smoking history of at least 20 pack years (packs per day x number of years you smoked); (5) You get a written order from a healthcare provider.  Glaucoma Screening: covered annually if you're considered high risk: (1) You have diabetes OR (2) Family history of glaucoma OR (3)  aged 50 and older OR (4)  American aged 65 and older  Osteoporosis Screening: covered every 2 years if you meet one of the following conditions: (1) Have a vertebral abnormality; (2) On glucocorticoid therapy for more than 3 months; (3) Have primary hyperparathyroidism; (4) On osteoporosis medications and need to assess response to drug therapy.  HIV Screening: covered annually if you're between the age of 15-65. Also covered annually if you are younger than 15 and older than 65 with risk factors for HIV infection. For pregnant patients, it is covered up to 3 times per pregnancy.    Immunizations:  Immunization Recommendations   Influenza Vaccine Annual influenza vaccination during flu season is recommended for all persons aged >= 6 months who do not have contraindications   Pneumococcal Vaccine   * Pneumococcal conjugate vaccine = PCV13 (Prevnar 13), PCV15 (Vaxneuvance), PCV20 (Prevnar 20)  * Pneumococcal polysaccharide vaccine = PPSV23  (Pneumovax) Adults 19-63 yo with certain risk factors or if 65+ yo  If never received any pneumonia vaccine: recommend Prevnar 20 (PCV20)  Give PCV20 if previously received 1 dose of PCV13 or PPSV23   Hepatitis B Vaccine 3 dose series if at intermediate or high risk (ex: diabetes, end stage renal disease, liver disease)   Respiratory syncytial virus (RSV) Vaccine - COVERED BY MEDICARE PART D  * RSVPreF3 (Arexvy) CDC recommends that adults 60 years of age and older may receive a single dose of RSV vaccine using shared clinical decision-making (SCDM)   Tetanus (Td) Vaccine - COST NOT COVERED BY MEDICARE PART B Following completion of primary series, a booster dose should be given every 10 years to maintain immunity against tetanus. Td may also be given as tetanus wound prophylaxis.   Tdap Vaccine - COST NOT COVERED BY MEDICARE PART B Recommended at least once for all adults. For pregnant patients, recommended with each pregnancy.   Shingles Vaccine (Shingrix) - COST NOT COVERED BY MEDICARE PART B  2 shot series recommended in those 19 years and older who have or will have weakened immune systems or those 50 years and older     Health Maintenance Due:      Topic Date Due   • Colorectal Cancer Screening  Never done   • HIV Screening  Completed   • Hepatitis C Screening  Completed     Immunizations Due:      Topic Date Due   • COVID-19 Vaccine (6 - 2024-25 season) 09/01/2024   • Influenza Vaccine (Season Ended) 09/01/2025     Advance Directives   What are advance directives?  Advance directives are legal documents that state your wishes and plans for medical care. These plans are made ahead of time in case you lose your ability to make decisions for yourself. Advance directives can apply to any medical decision, such as the treatments you want, and if you want to donate organs.   What are the types of advance directives?  There are many types of advance directives, and each state has rules about how to use them. You may  choose a combination of any of the following:  Living will:  This is a written record of the treatment you want. You can also choose which treatments you do not want, which to limit, and which to stop at a certain time. This includes surgery, medicine, IV fluid, and tube feedings.   Durable power of  for healthcare (DPAHC):  This is a written record that states who you want to make healthcare choices for you when you are unable to make them for yourself. This person, called a proxy, is usually a family member or a friend. You may choose more than 1 proxy.  Do not resuscitate (DNR) order:  A DNR order is used in case your heart stops beating or you stop breathing. It is a request not to have certain forms of treatment, such as CPR. A DNR order may be included in other types of advance directives.  Medical directive:  This covers the care that you want if you are in a coma, near death, or unable to make decisions for yourself. You can list the treatments you want for each condition. Treatment may include pain medicine, surgery, blood transfusions, dialysis, IV or tube feedings, and a ventilator (breathing machine).  Values history:  This document has questions about your views, beliefs, and how you feel and think about life. This information can help others choose the care that you would choose.  Why are advance directives important?  An advance directive helps you control your care. Although spoken wishes may be used, it is better to have your wishes written down. Spoken wishes can be misunderstood, or not followed. Treatments may be given even if you do not want them. An advance directive may make it easier for your family to make difficult choices about your care.   Weight Management   Why it is important to manage your weight:  Being overweight increases your risk of health conditions such as heart disease, high blood pressure, type 2 diabetes, and certain types of cancer. It can also increase your risk  for osteoarthritis, sleep apnea, and other respiratory problems. Aim for a slow, steady weight loss. Even a small amount of weight loss can lower your risk of health problems.  How to lose weight safely:  A safe and healthy way to lose weight is to eat fewer calories and get regular exercise. You can lose up about 1 pound a week by decreasing the number of calories you eat by 500 calories each day.   Healthy meal plan for weight management:  A healthy meal plan includes a variety of foods, contains fewer calories, and helps you stay healthy. A healthy meal plan includes the following:  Eat whole-grain foods more often.  A healthy meal plan should contain fiber. Fiber is the part of grains, fruits, and vegetables that is not broken down by your body. Whole-grain foods are healthy and provide extra fiber in your diet. Some examples of whole-grain foods are whole-wheat breads and pastas, oatmeal, brown rice, and bulgur.  Eat a variety of vegetables every day.  Include dark, leafy greens such as spinach, kale, jaime greens, and mustard greens. Eat yellow and orange vegetables such as carrots, sweet potatoes, and winter squash.   Eat a variety of fruits every day.  Choose fresh or canned fruit (canned in its own juice or light syrup) instead of juice. Fruit juice has very little or no fiber.  Eat low-fat dairy foods.  Drink fat-free (skim) milk or 1% milk. Eat fat-free yogurt and low-fat cottage cheese. Try low-fat cheeses such as mozzarella and other reduced-fat cheeses.  Choose meat and other protein foods that are low in fat.  Choose beans or other legumes such as split peas or lentils. Choose fish, skinless poultry (chicken or turkey), or lean cuts of red meat (beef or pork). Before you cook meat or poultry, cut off any visible fat.   Use less fat and oil.  Try baking foods instead of frying them. Add less fat, such as margarine, sour cream, regular salad dressing and mayonnaise to foods. Eat fewer high-fat foods.  Some examples of high-fat foods include french fries, doughnuts, ice cream, and cakes.  Eat fewer sweets.  Limit foods and drinks that are high in sugar. This includes candy, cookies, regular soda, and sweetened drinks.  Exercise:  Exercise at least 30 minutes per day on most days of the week. Some examples of exercise include walking, biking, dancing, and swimming. You can also fit in more physical activity by taking the stairs instead of the elevator or parking farther away from stores. Ask your healthcare provider about the best exercise plan for you.   Narcotic (Opioid) Safety    Use narcotics safely:  Take prescribed narcotics exactly as directed  Do not give narcotics to others or take narcotics that belong to someone else  Do not mix narcotics without medicines or alcohol  Do not drive or operate heavy machinery after you take the narcotic  Monitor for side effects and notify your healthcare provider if you experienced side effects such as nausea, sleepiness, itching, or trouble thinking clearly.    Manage constipation:    Constipation is the most common side effect of narcotic medicine. Constipation is when you have hard, dry bowel movements, or you go longer than usual between bowel movements. Tell your healthcare provider about all changes in your bowel movements while you are taking narcotics. He or she may recommend laxative medicine to help you have a bowel movement. He or she may also change the kind of narcotic you are taking, or change when you take it. The following are more ways you can prevent or relieve constipation:    Drink liquids as directed.  You may need to drink extra liquids to help soften and move your bowels. Ask how much liquid to drink each day and which liquids are best for you.  Eat high-fiber foods.  This may help decrease constipation by adding bulk to your bowel movements. High-fiber foods include fruits, vegetables, whole-grain breads and cereals, and beans. Your healthcare  provider or dietitian can help you create a high-fiber meal plan. Your provider may also recommend a fiber supplement if you cannot get enough fiber from food.  Exercise regularly.  Regular physical activity can help stimulate your intestines. Walking is a good exercise to prevent or relieve constipation. Ask which exercises are best for you.  Schedule a time each day to have a bowel movement.  This may help train your body to have regular bowel movements. Bend forward while you are on the toilet to help move the bowel movement out. Sit on the toilet for at least 10 minutes, even if you do not have a bowel movement.    Store narcotics safely:   Store narcotics where others cannot easily get them.  Keep them in a locked cabinet or secure area. Do not  keep them in a purse or other bag you carry with you. A person may be looking for something else and find the narcotics.  Make sure narcotics are stored out of the reach of children.  A child can easily overdose on narcotics. Narcotics may look like candy to a small child.    The best way to dispose of narcotics:      The laws vary by country and area. In the [place], the best way is to return the narcotics through a take-back program. This program is offered by the [place] Drug Enforcement Agency (ARNAUD). The following are options for using the program:  Take the narcotics to a ARNAUD collection site.  The site is often a law enforcement center. Call your local law enforcement center for scheduled take-back days in your area. You will be given information on where to go if the collection site is in a different location.  Take the narcotics to an approved pharmacy or hospital.  A pharmacy or hospital may be set up as a collection site. You will need to ask if it is a ARNAUD collection site if you were not directed there. A pharmacy or doctor's office may not be able to take back narcotics unless it is a ARNAUD site.  Use a mail-back system.  This means you are given containers to  put the narcotics into. You will then mail them in the containers.  Use a take-back drop box.  This is a place to leave the narcotics at any time. People and animals will not be able to get into the box. Your local law enforcement agency can tell you where to find a drop box in your area.    Other ways to manage pain:   Ask your healthcare provider about non-narcotic medicines to control pain.  Nonprescription medicines include NSAIDs (such as ibuprofen) and acetaminophen. Prescription medicines include muscle relaxers, antidepressants, and steroids.  Pain may be managed without any medicines.  Some ways to relieve pain include massage, aromatherapy, or meditation. Physical or occupational therapy may also help.    For more information:   Drug Enforcement Administration  [address] [place] [address]  Phone: [phone]  Web Address: https://www.deadiversion.[facility].gov/drug_disposal/    [place] Food and Drug Administration  [address] [place] [address]  Phone: 5- 698 - 057-3924  Web Address: http://www.fda.gov   © Copyright [employer] 2018 Information is for End User's use only and may not be sold, redistributed or otherwise used for commercial purposes. All illustrations and images included in CareNotes® are the copyrighted property of [employer] or [employer]

## 2025-06-03 NOTE — ASSESSMENT & PLAN NOTE
Orders:    Vitamin B12; Future    Vitamin B12; Future    vitamin B-12 (VITAMIN B-12) 500 mcg tablet; Take 1 tablet (500 mcg total) by mouth daily

## 2025-06-03 NOTE — ASSESSMENT & PLAN NOTE
Orders:    Quantiferon TB Gold Plus Assay; Future    Hepatitis B surface antigen; Future    Urinalysis with microscopic; Future

## 2025-06-03 NOTE — ASSESSMENT & PLAN NOTE
Orders:    Lacosamide; Future    Levetiracetam level; Future    Valproic acid level, total; Future    Zonisamide level; Future    Lacosamide; Future    Valproic acid level, free; Future    Zonisamide level; Future    Levetiracetam level; Future

## 2025-06-03 NOTE — PROGRESS NOTES
Name: Jairon Oconnell      : 1965      MRN: [id number]  Encounter Provider: HUNTER Rose  Encounter Date: 6/3/2025   Encounter department: [facility]  :  Assessment & Plan  Encounter for screening for malignant neoplasm of colon         Annual physical exam    Orders:    CBC and differential; Future    Comprehensive metabolic panel; Future    CBC and differential; Future    Comprehensive metabolic panel; Future    Prediabetes    Orders:    Hemoglobin A1C; Future    Hemoglobin A1C; Future    Impaired fasting glucose    Orders:    Hemoglobin A1C; Future    Hemoglobin A1C; Future    On long term drug therapy    Orders:    Hemoglobin A1C; Future    Lipid Panel with Direct LDL reflex; Future    TSH, 3rd generation with Free T4 reflex; Future    Vitamin D 25 hydroxy; Future    Hemoglobin A1C; Future    Lipid Panel with Direct LDL reflex; Future    TSH, 3rd generation with Free T4 reflex; Future    Vitamin D 25 hydroxy; Future    Pure hypercholesterolemia    Orders:    Lipid Panel with Direct LDL reflex; Future    Lipid Panel with Direct LDL reflex; Future    Screening for malignant neoplasm of prostate    Orders:    PSA, Total Screen; Future    PSA, Total Screen; Future    Acquired hypothyroidism    Orders:    TSH, 3rd generation with Free T4 reflex; Future    TSH, 3rd generation with Free T4 reflex; Future    Vitamin D deficiency    Orders:    Vitamin D 25 hydroxy; Future    Vitamin D 25 hydroxy; Future    Medicare annual wellness visit, subsequent    Orders:    Quantiferon TB Gold Plus Assay; Future    Hepatitis B surface antigen; Future    Urinalysis with microscopic; Future    Disorders of magnesium metabolism, unspecified    Orders:    Magnesium; Future    Magnesium; Future    Vitamin B12 deficiency (dietary) anemia      Orders:    Vitamin B12; Future    Vitamin B12; Future    vitamin B-12 (VITAMIN B-12) 500 mcg tablet; Take 1 tablet (500 mcg total) by mouth  daily    Other folate deficiency anemias      Orders:    Folate; Future    Folate; Future    Iron deficiency anemia secondary to inadequate dietary iron intake      Orders:    Iron Panel (Includes Ferritin, Iron Sat%, Iron, and TIBC); Future    Iron Panel (Includes Ferritin, Iron Sat%, Iron, and TIBC); Future    Generalized convulsive epilepsy (HCC)    Orders:    Lacosamide; Future    Levetiracetam level; Future    Valproic acid level, total; Future    Zonisamide level; Future    Lacosamide; Future    Valproic acid level, free; Future    Zonisamide level; Future    Levetiracetam level; Future    Primary hypertension         Slow transit constipation  With increased stool burden on recent xrays  Continue docusate sodium, senna, miralax        Ataxic cerebral palsy (HCC)         Encounter for therapeutic drug level monitoring    Orders:    Lacosamide; Future    Levetiracetam level; Future    Valproic acid level, total; Future    Zonisamide level; Future    Lacosamide; Future    Valproic acid level, free; Future    Zonisamide level; Future    Levetiracetam level; Future    Abnormal results of thyroid function studies    Orders:    TSH, 3rd generation with Free T4 reflex; Future    Dependence on respirator (ventilator) status (HCC)         Parkinsonian tremor (HCC)         Pressure ulcer of sacral region, stage 3 (HCC)         Thrombocytopathia (HCC)         Immunity to hepatitis B virus demonstrated by serologic test    Orders:    Hepatitis B surface antigen; Future    Encounter for screening for other viral diseases    Orders:    Hepatitis B surface antigen; Future    Metabolic encephalopathy    Orders:    polyethylene glycol (MIRALAX) 17 g packet; Take 17 g by mouth daily    senna-docusate sodium (SENOKOT S) 8.6-50 mg per tablet; Take 2 tablets by mouth 2 (two) times a day    folic acid (FOLVITE) 1 mg tablet; Take 1 tablet (1 mg total) by mouth daily    magnesium Oxide (MAG-OX) 400 mg TABS; Take 1 tablet (400 mg  total) by mouth in the morning and 1 tablet (400 mg total) in the evening. 8 AM and 8 PM.    Hyperlipidemia, unspecified hyperlipidemia type    Orders:    simvastatin (ZOCOR) 40 mg tablet; Take 1 tablet (40 mg total) by mouth daily at bedtime    Multiple Vitamin (Daily-Reina) TABS; Take 1 tablet by mouth daily Take at 8 AM    Constipation, unspecified constipation type    Orders:    docusate sodium (COLACE) 100 mg capsule; Take 1 capsule (100 mg total) by mouth in the morning and 1 capsule (100 mg total) in the evening. 8 AM and 8 PM.    Hypothyroidism due to Hashimoto's thyroiditis    Orders:    levothyroxine 150 mcg tablet; Take 1 tablet (150 mcg total) by mouth daily    Environmental and seasonal allergies    Orders:    loratadine (CLARITIN) 10 mg tablet; Take 1 tablet (10 mg total) by mouth daily    Diabetes mellitus due to underlying condition with hyperosmolarity without coma, without long-term current use of insulin (HCC)    Lab Results   Component Value Date    HGBA1C 6.4 (H) 06/03/2025     Ref Range & Units:  Normal 4.0-5.6%  PreDiabetic 5.7-6.4%  Diabetic >=6.5%  Glycemic control for adults with diabetes <7.0%     Discussed diet and exercise  Discussed carbohydrates (breads, pastas, potatoes)  Discussed sugars  Will monitor lab    Orders:    metFORMIN (GLUCOPHAGE) 1000 MG tablet; Take 1 tablet (1,000 mg total) by mouth 2 (two) times a day with meals    Multiple Vitamin (Daily-Reina) TABS; Take 1 tablet by mouth daily Take at 8 AM    Seizure (HCC)    Orders:    Multiple Vitamin (Daily-Reina) TABS; Take 1 tablet by mouth daily Take at 8 AM    Neuropathy    Orders:    Multiple Vitamin (Daily-Reina) TABS; Take 1 tablet by mouth daily Take at 8 AM    Hypertension, unspecified type    Orders:    Multiple Vitamin (Daily-Reina) TABS; Take 1 tablet by mouth daily Take at 8 AM      Depression Screening and Follow-up Plan: Patient was screened for depression during today's encounter. They screened negative with a PHQ-2  score of 0.        Preventive health issues were discussed with patient, and age appropriate screening tests were ordered as noted in patient's After Visit Summary. Personalized health advice and appropriate referrals for health education or preventive services given if needed, as noted in patient's After Visit Summary.    History of Present Illness     The patient is here today to discuss his Medicare annual wellness visit.   I reviewed their medical conditions, medications, laboratory and radiology studies.  I reviewed their scheduled future lab studies with the patient.     The patient's current treatment plan is effective.   There is no change in the current therapy.   I ask the patient to continue their current therapy.   The patient voiced their understanding and agreement.     Follow up in as scheduled.  Please continue to the PORCH section of the note for details of today's visit.              Patient Care Team:  HUNTER Rose as PCP - General (Internal Medicine)  Keshav Mcknight MD as Surgeon (General Surgery)  John Barajas MD (Urology)    Review of Systems   Constitutional:  Negative for activity change, chills, fatigue and fever.   HENT:  Negative for rhinorrhea and sore throat.    Eyes:  Negative for pain.   Respiratory:  Negative for cough and shortness of breath.    Cardiovascular:  Negative for chest pain, palpitations and leg swelling.   Gastrointestinal:  Positive for constipation. Negative for abdominal pain, diarrhea, nausea and vomiting.   Genitourinary:  Negative for difficulty urinating, flank pain, frequency and urgency.   Musculoskeletal:  Positive for arthralgias and myalgias. Negative for gait problem and joint swelling.   Skin:  Negative for color change.   Neurological:  Negative for dizziness, weakness, light-headedness and headaches.   Psychiatric/Behavioral:  Positive for agitation. Negative for sleep disturbance. The patient is nervous/anxious.    All other systems  reviewed and are negative.    Medical History Reviewed by provider this encounter:  Tobacco  Allergies  Meds  Problems  Med Hx  Surg Hx  Fam Hx       Annual Wellness Visit Questionnaire   Jairon is here for his Subsequent Wellness visit. Last Medicare Wellness visit information reviewed, patient interviewed and updates made to the record today.      Health Risk Assessment:   Patient rates overall health as good. Patient feels that their physical health rating is same. Patient is satisfied with their life. Eyesight was rated as same. Hearing was rated as same. Patient feels that their emotional and mental health rating is same. Patients states they are never, rarely angry. Patient states they are never, rarely unusually tired/fatigued. Pain experienced in the last 7 days has been none. Patient states that he has experienced no weight loss or gain in last 6 months.     Depression Screening:   PHQ-2 Score: 0  PHQ-9 Score: 0      Fall Risk Screening:   In the past year, patient has experienced: no history of falling in past year      Home Safety:  Patient has trouble with stairs inside or outside of their home. Patient has working smoke alarms and has working carbon monoxide detector. Home safety hazards include: none.     Nutrition:   Current diet is Diabetic.     Medications:   Patient is not currently taking any over-the-counter supplements. Patient is able to manage medications.     Activities of Daily Living (ADLs)/Instrumental Activities of Daily Living (IADLs):   Walk and transfer into and out of bed and chair?: Yes  Dress and groom yourself?: Yes    Bathe or shower yourself?: Yes    Feed yourself? Yes  Do your laundry/housekeeping?: Yes  Manage your money, pay your bills and track your expenses?: Yes  Make your own meals?: Yes    Do your own shopping?: Yes    Previous Hospitalizations:   Any hospitalizations or ED visits within the last 12 months?: Yes    How many hospitalizations have you had in the last  year?: 3-4    Advance Care Planning:   Living will: Yes    Durable POA for healthcare: Yes    Advanced directive: Yes      Cognitive Screening:   Provider or family/friend/caregiver concerned regarding cognition?: No    Preventive Screenings      Cardiovascular Screening:    General: Screening Not Indicated and History Lipid Disorder      Diabetes Screening:     General: Screening Not Indicated and History Diabetes      Prostate Cancer Screening:    General: Screening Current      Osteoporosis Screening:    General: Screening Not Indicated and History Osteoporosis      Lung Cancer Screening:     General: Screening Not Indicated      Hepatitis C Screening:    General: Screening Current    Immunizations:  - Immunizations due: Zoster (Shingrix)    Screening, Brief Intervention, and Referral to Treatment (SBIRT)     Screening  Typical number of drinks in a day: 0  Typical number of drinks in a week: 0  Interpretation: Low risk drinking behavior.    Single Item Drug Screening:  How often have you used an illegal drug (including marijuana) or a prescription medication for non-medical reasons in the past year? never    Single Item Drug Screen Score: 0  Interpretation: Negative screen for possible drug use disorder    Review of Current Opioid Use    Opioid Risk Tool (ORT) Interpretation: Complete Opioid Risk Tool (ORT)    [place] PDMP or [place]  reviewed. No red flags were identified    Other Counseling Topics:   Car/seat belt/driving safety, skin self-exam, sunscreen and calcium and vitamin D intake and regular weightbearing exercise.     Social Drivers of Health     Food Insecurity: No Food Insecurity (6/3/2025)    Nursing - Inadequate Food Risk Classification     Worried About Running Out of Food in the Last Year: Never true     Ran Out of Food in the Last Year: Never true     Ran Out of Food in the Last Year: Never true   Transportation Needs: No Transportation Needs (6/3/2025)    PRAPARE - Transportation     Lack of  Transportation (Medical): No     Lack of Transportation (Non-Medical): No   Housing Stability: Low Risk  (6/3/2025)    Housing Stability Vital Sign     Unable to Pay for Housing in the Last Year: No     Number of Times Moved in the Last Year: 0     Homeless in the Last Year: No   Utilities: Not At Risk (6/3/2025)    [facility] Utilities     Threatened with loss of utilities: No     No results found.    Objective   /64 (BP Location: Left arm, Patient Position: Sitting, Cuff Size: Large)   Pulse 90   Temp 97.6 °F (36.4 °C) (Tympanic)   Resp 16   Ht 6' (1.829 m)   Wt 87.1 kg (192 lb)   SpO2 98%   BMI 26.04 kg/m²     Physical Exam  Vitals and nursing note reviewed.   Constitutional:       General: He is awake.      Appearance: Normal appearance. He is well-developed.   HENT:      Head: Normocephalic and atraumatic.      Nose: Nose normal.      Mouth/Throat:      Mouth: Mucous membranes are moist.     Eyes:      Conjunctiva/sclera: Conjunctivae normal.       Cardiovascular:      Rate and Rhythm: Normal rate and regular rhythm.      Pulses: Normal pulses.      Heart sounds: Normal heart sounds. No murmur heard.  Pulmonary:      Effort: Pulmonary effort is normal. No respiratory distress.      Breath sounds: Normal breath sounds.   Abdominal:      General: Bowel sounds are normal.      Palpations: Abdomen is soft.      Tenderness: There is no abdominal tenderness.      Comments: Constipation issues     Musculoskeletal:      Cervical back: Neck supple.      Right lower leg: No edema.      Left lower leg: No edema.      Comments: Generalized aches and pains     Skin:     General: Skin is warm and dry.     Neurological:      Mental Status: He is alert and oriented to person, place, and time.      Motor: Motor function is intact.      Coordination: Coordination is intact.      Gait: Gait is intact.     Psychiatric:         Attention and Perception: Attention normal.         Mood and Affect: Mood is anxious.          Speech: Speech normal.         Behavior: Behavior normal. Behavior is cooperative.         Thought Content: Thought content normal.         Cognition and Memory: Cognition is impaired.         Judgment: Judgment normal.       Administrative Statements   I have spent a total time of 40 minutes in caring for this patient on the day of the visit/encounter including Diagnostic results, Prognosis, Risks and benefits of tx options, Instructions for management, Patient and family education, Importance of tx compliance, Risk factor reductions, Impressions, Counseling / Coordination of care, Documenting in the medical record, Reviewing/placing orders in the medical record (including tests, medications, and/or procedures), and Obtaining or reviewing history  .

## 2025-06-03 NOTE — ASSESSMENT & PLAN NOTE
Orders:    Iron Panel (Includes Ferritin, Iron Sat%, Iron, and TIBC); Future    Iron Panel (Includes Ferritin, Iron Sat%, Iron, and TIBC); Future

## 2025-06-03 NOTE — ASSESSMENT & PLAN NOTE
Orders:    Hemoglobin A1C; Future    Lipid Panel with Direct LDL reflex; Future    TSH, 3rd generation with Free T4 reflex; Future    Vitamin D 25 hydroxy; Future    Hemoglobin A1C; Future    Lipid Panel with Direct LDL reflex; Future    TSH, 3rd generation with Free T4 reflex; Future    Vitamin D 25 hydroxy; Future

## 2025-06-04 LAB
GAMMA INTERFERON BACKGROUND BLD IA-ACNC: 0.06 IU/ML
M TB IFN-G BLD-IMP: NEGATIVE
M TB IFN-G CD4+ BCKGRND COR BLD-ACNC: 0.03 IU/ML
M TB IFN-G CD4+ BCKGRND COR BLD-ACNC: 0.03 IU/ML
MITOGEN IGNF BCKGRD COR BLD-ACNC: 9.28 IU/ML

## 2025-06-04 RX ORDER — POLYETHYLENE GLYCOL 3350 17 G/17G
17 POWDER, FOR SOLUTION ORAL DAILY
Qty: 510 G | Refills: 11 | Status: SHIPPED | OUTPATIENT
Start: 2025-06-04

## 2025-06-04 RX ORDER — SIMVASTATIN 40 MG
40 TABLET ORAL
Qty: 30 TABLET | Refills: 11 | Status: SHIPPED | OUTPATIENT
Start: 2025-06-04

## 2025-06-04 RX ORDER — LORATADINE 10 MG/1
10 TABLET ORAL DAILY
Qty: 30 TABLET | Refills: 11 | Status: SHIPPED | OUTPATIENT
Start: 2025-06-04

## 2025-06-04 RX ORDER — LANOLIN ALCOHOL/MO/W.PET/CERES
400 CREAM (GRAM) TOPICAL 2 TIMES DAILY
Qty: 60 TABLET | Refills: 0 | Status: SHIPPED | OUTPATIENT
Start: 2025-06-04 | End: 2025-07-04

## 2025-06-04 RX ORDER — AMOXICILLIN 250 MG
2 CAPSULE ORAL 2 TIMES DAILY
Qty: 60 TABLET | Refills: 11 | Status: SHIPPED | OUTPATIENT
Start: 2025-06-04

## 2025-06-04 RX ORDER — FOLIC ACID 1 MG/1
1 TABLET ORAL DAILY
Qty: 30 TABLET | Refills: 11 | Status: SHIPPED | OUTPATIENT
Start: 2025-06-04

## 2025-06-04 RX ORDER — LEVOTHYROXINE SODIUM 150 UG/1
150 TABLET ORAL DAILY
Qty: 30 TABLET | Refills: 11 | Status: SHIPPED | OUTPATIENT
Start: 2025-06-04

## 2025-06-04 RX ORDER — DOCUSATE SODIUM 100 MG/1
100 CAPSULE, LIQUID FILLED ORAL 2 TIMES DAILY
Qty: 60 CAPSULE | Refills: 11 | Status: SHIPPED | OUTPATIENT
Start: 2025-06-04

## 2025-06-04 RX ORDER — MULTIVITAMIN WITH FOLIC ACID 400 MCG
1 TABLET ORAL DAILY
Qty: 30 TABLET | Refills: 11 | Status: SHIPPED | OUTPATIENT
Start: 2025-06-04

## 2025-06-04 RX ORDER — MULTIVITAMIN WITH IRON
500 TABLET ORAL DAILY
Qty: 30 TABLET | Refills: 11 | Status: SHIPPED | OUTPATIENT
Start: 2025-06-04

## 2025-06-04 NOTE — ASSESSMENT & PLAN NOTE
Orders:    polyethylene glycol (MIRALAX) 17 g packet; Take 17 g by mouth daily    senna-docusate sodium (SENOKOT S) 8.6-50 mg per tablet; Take 2 tablets by mouth 2 (two) times a day    folic acid (FOLVITE) 1 mg tablet; Take 1 tablet (1 mg total) by mouth daily    magnesium Oxide (MAG-OX) 400 mg TABS; Take 1 tablet (400 mg total) by mouth in the morning and 1 tablet (400 mg total) in the evening. 8 AM and 8 PM.

## 2025-06-05 LAB — ZONISAMIDE SERPL-MCNC: 9.8 UG/ML (ref 10–40)

## 2025-06-06 LAB — BACTERIA UR CULT: NORMAL

## 2025-06-09 ENCOUNTER — TELEMEDICINE (OUTPATIENT)
Dept: FAMILY MEDICINE CLINIC | Facility: CLINIC | Age: 60
End: 2025-06-09
Payer: MEDICARE

## 2025-06-09 DIAGNOSIS — K59.01 SLOW TRANSIT CONSTIPATION: Chronic | ICD-10-CM

## 2025-06-09 DIAGNOSIS — E03.9 ACQUIRED HYPOTHYROIDISM: Chronic | ICD-10-CM

## 2025-06-09 DIAGNOSIS — F69 BEHAVIOR CONCERN IN ADULT: Chronic | ICD-10-CM

## 2025-06-09 DIAGNOSIS — G40.309 GENERALIZED CONVULSIVE EPILEPSY (HCC): Chronic | ICD-10-CM

## 2025-06-09 DIAGNOSIS — K62.5 RECTAL BLEEDING: Primary | ICD-10-CM

## 2025-06-09 DIAGNOSIS — Z09 HOSPITAL DISCHARGE FOLLOW-UP: Chronic | ICD-10-CM

## 2025-06-09 DIAGNOSIS — T18.5XXS: ICD-10-CM

## 2025-06-09 DIAGNOSIS — G80.4 ATAXIC CEREBRAL PALSY (HCC): Chronic | ICD-10-CM

## 2025-06-09 DIAGNOSIS — I10 PRIMARY HYPERTENSION: Chronic | ICD-10-CM

## 2025-06-09 DIAGNOSIS — E11.40 TYPE 2 DIABETES MELLITUS WITH DIABETIC NEUROPATHY, WITHOUT LONG-TERM CURRENT USE OF INSULIN (HCC): Chronic | ICD-10-CM

## 2025-06-09 PROCEDURE — 99495 TRANSJ CARE MGMT MOD F2F 14D: CPT | Performed by: NURSE PRACTITIONER

## 2025-06-09 NOTE — ASSESSMENT & PLAN NOTE
Lab Results   Component Value Date    HGBA1C 6.4 (H) 06/03/2025     Blood Sugar Average: Last 72 hrs   Well-controlled a/e/b A1c 6.4%, will update this admission  Hold home med [metformin]   Humalog SSI # 2   [facility] diet   Blood glucose monitoring q6h while NPO   Hypoglycemia protocol

## 2025-06-09 NOTE — ASSESSMENT & PLAN NOTE
Chronic issue, on miralax and colace at baseline   CT a/p :moderate to large colonic stool burden  Will give dose of Magnesium Citrate and monitor for effect  Dulcolax TX if needed vs enema

## 2025-06-09 NOTE — ASSESSMENT & PLAN NOTE
Lab Results   Component Value Date    HGBA1C 6.4 (H) 06/03/2025     Well-controlled a/e/b A1c 6.4%, will update this admission  Hold home med [metformin]   Humalog SSI # 2   [facility] diet   Blood glucose monitoring q6h while NPO   Hypoglycemia protocol

## 2025-06-09 NOTE — ASSESSMENT & PLAN NOTE
Chronic issue, on miralax and colace at baseline   CT a/p :moderate to large colonic stool burden  Will give dose of Magnesium Citrate and monitor for effect  Dulcolax GA if needed vs enema

## 2025-06-09 NOTE — PROGRESS NOTES
Virtual TCM Visit:  Name: Jairon Oconnell      : 1965      MRN: [id number]  Encounter Provider: HUNTER Rose  Encounter Date: 2025   Encounter department: Pending sale to [facility]  :  Assessment & Plan  Type 2 diabetes mellitus with diabetic neuropathy, without long-term current use of insulin (Formerly [facility])    Lab Results   Component Value Date    HGBA1C 6.4 (H) 2025     Well-controlled a/e/b A1c 6.4%, will update this admission  Hold home med [metformin]   Humalog SSI # 2   [facility] diet   Blood glucose monitoring q6h while NPO   Hypoglycemia protocol         Hospital discharge follow-up  The patient was hospitalized from 2025 to 2025.  I personally reviewed the patient's hospital records.  Reason for Admission/HPI:  Rectal Bleeding (Woke up with blood in his bed and on his buttocks, per caregiver, patients sister gave him a toy windmill and he stuck it up his rectum, per caregiver the plastic windmill was removed and intact.)   Hospital Course:  Jairon Oconnell is a 60 y.o. male with a PMH of cerepral palsy, depression, diabetes, hypertension, nephrolithiasis, seizures, hypothyroidism, psychiatric disorder, intellectual disability, and limited verbal communication who presents with rectal bleeding secondary to insertion of foreign body into rectum. Per caregiver, object was removed intact, however patient had bleeding and thus evaluation was sought. ER did speak to GI who is recommending observation for inpatient scope to evaluate for any trauma requiring intervention, though CT scan does not identify bowel perforation and notes moderate to large colonic stool burden and non-obstructing left nephrolithiasis. Patient was then evaluated with conservative treatment and thankfully did not require any kind of endoscopic procedure. He had 1 episode of bright red blood per rectum however this was a singular occurrence. Patient's hemoglobin was monitored and remained stable between  9 and 10. At this time patient is medically stable for discharge and caregivers and patient are agreeable for plan to discharge.   The patient's discharge medications were reviewed and updated.   Medication changes include:  None   Lab results pending include:  None   Procedures Performed:  None   Imagine results pending include:  None   Significant Findings/Test Results:  CT abdomen pelvis with contrast  Result Date: 5/28/2025  Impression: 1.  No acute abnormality in the abdomen or pelvis. No evidence of pneumoperitoneum or retained rectal foreign body. 2.  Moderate to large colonic stool burden. 3.  Nonobstructing left renal stones.    Echo complete w/ contrast if indicated   Interpretation Summary    Technically difficult study.    Left Ventricle: Left ventricular cavity size is normal. Wall thickness is mildly increased. There is mild concentric hypertrophy. The left ventricular ejection fraction is 55%. Systolic function is normal. Wall motion cannot be accurately assessed. Diastolic function is normal.    Tricuspid Valve: There is trace regurgitation. The right ventricular systolic pressure is normal.  Follow up Visits:  FL ESOPHAGRAM COMPLETE   Monday Jun 16, 2025 9:30 AM ADULT PREP:   [facility], [address], [place], [address]  [phone] For appointments from 6 AM - 8 PM, please enter through the outpatient entrance. The outpatient entrance is located facing [address] directly behind the fountain.  Outpatient registration is located on the left side of the lobby.  Upon arrival please use the self service kiosk in the main lobby to check in. The Fluoro department can be reached at [phone] if you need assistance.        Rectal bleeding  Presents with report of BRBPR following insertion of a foreign body (toy windmill). Foreign body removed however given the trauma  Serial abdominal exams  Reports no current bowel movement or current  bleeding  Hemoglobin 9.4 and up trended to 12, down trended to 10, I believe 12 hemoglobin likely in error, hemoglobin remained stable at 9  GI following and if any ongoing bleeding, will likely have a flex sig, will defer at this time given no ongoing evidence of bleeding, patient had 1 bowel movement with bright red blood, no longer having evidence of bleeding       Rectal foreign body, sequela  Monitor patient, maintain safe enviorment        Ataxic cerebral palsy (HCC)  History noted, fall precautions  Baseline stand pivot, frequent napping       Generalized convulsive epilepsy (HCC)  History noted, no recent seizure activity  Continue home AEDs:   Depakote 500 mg am, 1000 mg pm  Vimpat 150 mg am, 200 mg pm  Keppra 1500 mg bid  Zonegran 300 mg pm  Seizure precautions          Primary hypertension  Chronic, Monitor VS per unit protocol   Continue home dose lisinopril 5 mg daily           Acquired hypothyroidism  Chronic, last TSH visible is from 20204 and elevated with low FT4  Update TSH in the outpatient setting  Continue levothyroxine 150 mcg daily       Slow transit constipation  Chronic issue, on miralax and colace at baseline   CT a/p :moderate to large colonic stool burden  Will give dose of Magnesium Citrate and monitor for effect  Dulcolax IA if needed vs enema       Behavior concern in adult  Monitor mood and behaviors, required haldol in ER for agitation  Continue Seroquel 200 mg am, mid-day + Seroquel  mg pm                        History of Present Illness     Transitional Care Management Review:   Jairon Oconnell is a 60 y.o. male here for TCM follow up.    During the TCM phone call patient stated:  TCM Call (since 5/26/2025)       Date and time call was made  6/2/2025  7:10 AM    Patient was hospitial[facility] at  [facility]    Date of Admission  05/28/25    Date of discharge  05/30/25    Diagnosis  Rectal bleeding Principal problem    Disposition  Home; Home health services    Were the  patients medications reviewed and updated  Yes          TCM Call (since 5/26/2025)       Scheduled for follow up?  Yes    Did you obtain your prescribed medications  Yes    Do you need help managing your prescriptions or medications  No    Is transportation to your appointment needed  No    I have advised the patient to call PCP with any new or worsening symptoms  SYDNIE Hernandez    Support System  Home care staff          History of Present Illness            Review of Systems   Constitutional:  Negative for activity change, appetite change, chills, fatigue and fever.   HENT:  Negative for congestion, ear pain, postnasal drip, rhinorrhea, sinus pressure, sinus pain, sore throat and trouble swallowing.    Eyes:  Negative for pain.   Respiratory:  Negative for cough, chest tightness and shortness of breath.    Cardiovascular:  Negative for chest pain, palpitations and leg swelling.   Gastrointestinal:  Negative for abdominal pain, constipation, diarrhea, nausea and vomiting.   Genitourinary:  Negative for difficulty urinating, flank pain, frequency and urgency.   Musculoskeletal:  Positive for arthralgias and myalgias. Negative for back pain and joint swelling.   Skin:  Negative for color change.   Neurological:  Negative for dizziness, weakness, light-headedness and headaches.   Psychiatric/Behavioral:  Positive for behavioral problems. Negative for sleep disturbance. The patient is not nervous/anxious.      Objective   There were no vitals taken for this visit.  Physical Exam            Physical Exam  Vitals reviewed.   Constitutional:       General: He is awake.      Appearance: Normal appearance. He is well-developed.   HENT:      Head: Normocephalic.      Nose: Nose normal.      Mouth/Throat:      Mouth: Mucous membranes are moist.     Eyes:      Extraocular Movements: Extraocular movements intact.       Cardiovascular:      Rate and Rhythm: Normal rate.   Pulmonary:      Effort: Pulmonary effort is normal. No  respiratory distress.   Abdominal:      Palpations: Abdomen is soft.      Comments: No rectal bleeding      Musculoskeletal:         General: Normal range of motion.      Cervical back: Normal range of motion.     Skin:     General: Skin is warm and dry.     Neurological:      Mental Status: He is alert and oriented to person, place, and time.     Psychiatric:         Attention and Perception: Attention normal.         Mood and Affect: Mood normal.         Speech: Speech normal.         Behavior: Behavior is agitated. Behavior is cooperative.         Cognition and Memory: Cognition is impaired.       Medications have been reviewed by provider in current encounter    Administrative Statements   Encounter provider HUNTER Rose    The Patient is located at Home and in the following state in which I hold an active license [place].    The patient was identified by name and date of birth. Jairon SANTY Mockk was informed that this is a telemedicine visit and that the visit is being conducted through the Epic Embedded platform. He agrees to proceed..  My office door was closed. No one else was in the room.  He acknowledged consent and understanding of privacy and security of the video platform. The patient has agreed to participate and understands they can discontinue the visit at any time.    I have spent a total time of 20 minutes in caring for this patient on the day of the visit/encounter including Diagnostic results, Prognosis, Risks and benefits of tx options, Instructions for management, Patient and family education, Importance of tx compliance, Risk factor reductions, Impressions, Counseling / Coordination of care, Documenting in the medical record, Reviewing/placing orders in the medical record (including tests, medications, and/or procedures), and Obtaining or reviewing history  , not including the time spent for establishing the audio/video connection.    HUNTER Rose

## 2025-06-09 NOTE — ASSESSMENT & PLAN NOTE
The patient was hospitalized from 5/28/2025 to 5/30/2025.  I personally reviewed the patient's hospital records.  Reason for Admission/HPI:  Rectal Bleeding (Woke up with blood in his bed and on his buttocks, per caregiver, patients sister gave him a toy windmill and he stuck it up his rectum, per caregiver the plastic windmill was removed and intact. )   Hospital Course:  Jairon Oconnell is a 60 y.o. male with a PMH of cerepral palsy, depression, diabetes, hypertension, nephrolithiasis, seizures, hypothyroidism, psychiatric disorder, intellectual disability, and limited verbal communication who presents with rectal bleeding secondary to insertion of foreign body into rectum. Per caregiver, object was removed intact, however patient had bleeding and thus evaluation was sought. ER did speak to GI who is recommending observation for inpatient scope to evaluate for any trauma requiring intervention, though CT scan does not identify bowel perforation and notes moderate to large colonic stool burden and non-obstructing left nephrolithiasis. Patient was then evaluated with conservative treatment and thankfully did not require any kind of endoscopic procedure. He had 1 episode of bright red blood per rectum however this was a singular occurrence. Patient's hemoglobin was monitored and remained stable between 9 and 10. At this time patient is medically stable for discharge and caregivers and patient are agreeable for plan to discharge.   The patient's discharge medications were reviewed and updated.   Medication changes include:  None  Lab results pending include:  None  Procedures Performed:  None  Imagine results pending include:  None  Significant Findings/Test Results:  CT abdomen pelvis with contrast  Result Date: 5/28/2025  Impression: 1.  No acute abnormality in the abdomen or pelvis. No evidence of pneumoperitoneum or retained rectal foreign body. 2.  Moderate to large colonic stool burden. 3.  Nonobstructing left  renal stones.  Echo complete w/ contrast if indicated  Interpretation Summary    Technically difficult study.    Left Ventricle: Left ventricular cavity size is normal. Wall thickness is mildly increased. There is mild concentric hypertrophy. The left ventricular ejection fraction is 55%. Systolic function is normal. Wall motion cannot be accurately assessed. Diastolic function is normal.    Tricuspid Valve: There is trace regurgitation. The right ventricular systolic pressure is normal.  Follow up Visits:  As scheduled

## 2025-06-09 NOTE — ASSESSMENT & PLAN NOTE
The patient was hospitalized from 5/28/2025 to 5/30/2025.  I personally reviewed the patient's hospital records.  Reason for Admission/HPI:  Rectal Bleeding (Woke up with blood in his bed and on his buttocks, per caregiver, patients sister gave him a toy windmill and he stuck it up his rectum, per caregiver the plastic windmill was removed and intact.)   Hospital Course:  Jairon Oconnell is a 60 y.o. male with a PMH of cerepral palsy, depression, diabetes, hypertension, nephrolithiasis, seizures, hypothyroidism, psychiatric disorder, intellectual disability, and limited verbal communication who presents with rectal bleeding secondary to insertion of foreign body into rectum. Per caregiver, object was removed intact, however patient had bleeding and thus evaluation was sought. ER did speak to GI who is recommending observation for inpatient scope to evaluate for any trauma requiring intervention, though CT scan does not identify bowel perforation and notes moderate to large colonic stool burden and non-obstructing left nephrolithiasis. Patient was then evaluated with conservative treatment and thankfully did not require any kind of endoscopic procedure. He had 1 episode of bright red blood per rectum however this was a singular occurrence. Patient's hemoglobin was monitored and remained stable between 9 and 10. At this time patient is medically stable for discharge and caregivers and patient are agreeable for plan to discharge.   The patient's discharge medications were reviewed and updated.   Medication changes include:  None   Lab results pending include:  None   Procedures Performed:  None   Imagine results pending include:  None   Significant Findings/Test Results:  CT abdomen pelvis with contrast  Result Date: 5/28/2025  Impression: 1.  No acute abnormality in the abdomen or pelvis. No evidence of pneumoperitoneum or retained rectal foreign body. 2.  Moderate to large colonic stool burden. 3.  Nonobstructing  left renal stones.    Echo complete w/ contrast if indicated   Interpretation Summary    Technically difficult study.    Left Ventricle: Left ventricular cavity size is normal. Wall thickness is mildly increased. There is mild concentric hypertrophy. The left ventricular ejection fraction is 55%. Systolic function is normal. Wall motion cannot be accurately assessed. Diastolic function is normal.    Tricuspid Valve: There is trace regurgitation. The right ventricular systolic pressure is normal.  Follow up Visits:  FL ESOPHAGRAM COMPLETE   Monday Jun 16, 2025 9:30 AM ADULT PREP:   [facility], [address], [place], [address]  [phone] For appointments from 6 AM - 8 PM, please enter through the outpatient entrance. The outpatient entrance is located facing [address] directly behind the fountain.  Outpatient registration is located on the left side of the lobby.  Upon arrival please use the self service kiosk in the main lobby to check in. The Fluoro department can be reached at [phone] if you need assistance.

## 2025-06-10 DIAGNOSIS — N39.0 RECURRENT UTI: ICD-10-CM

## 2025-06-10 RX ORDER — METHENAMINE HIPPURATE 1000 MG/1
1 TABLET ORAL 2 TIMES DAILY WITH MEALS
Qty: 60 TABLET | Refills: 1 | Status: SHIPPED | OUTPATIENT
Start: 2025-06-10

## 2025-06-16 ENCOUNTER — OFFICE VISIT (OUTPATIENT)
Dept: FAMILY MEDICINE CLINIC | Facility: CLINIC | Age: 60
End: 2025-06-16
Payer: MEDICARE

## 2025-06-16 VITALS
HEIGHT: 72 IN | OXYGEN SATURATION: 97 % | DIASTOLIC BLOOD PRESSURE: 78 MMHG | TEMPERATURE: 97.9 F | BODY MASS INDEX: 25.47 KG/M2 | WEIGHT: 188 LBS | HEART RATE: 91 BPM | SYSTOLIC BLOOD PRESSURE: 132 MMHG

## 2025-06-16 DIAGNOSIS — L03.114 CELLULITIS OF LEFT UPPER EXTREMITY: Primary | Chronic | ICD-10-CM

## 2025-06-16 PROCEDURE — 99213 OFFICE O/P EST LOW 20 MIN: CPT | Performed by: NURSE PRACTITIONER

## 2025-06-16 PROCEDURE — G2211 COMPLEX E/M VISIT ADD ON: HCPCS | Performed by: NURSE PRACTITIONER

## 2025-06-16 RX ORDER — ACETAMINOPHEN 325 MG/1
650 TABLET ORAL EVERY 6 HOURS PRN
Qty: 30 TABLET | Refills: 0 | Status: SHIPPED | OUTPATIENT
Start: 2025-06-16

## 2025-06-16 RX ORDER — CEPHALEXIN 500 MG/1
500 CAPSULE ORAL 2 TIMES DAILY
Qty: 20 CAPSULE | Refills: 0 | Status: SHIPPED | OUTPATIENT
Start: 2025-06-16 | End: 2025-06-26

## 2025-06-16 RX ORDER — ACETAMINOPHEN 325 MG/1
650 TABLET ORAL EVERY 6 HOURS PRN
COMMUNITY
End: 2025-06-16

## 2025-06-16 RX ORDER — BENZOCAINE/MENTHOL 6 MG-10 MG
LOZENGE MUCOUS MEMBRANE 4 TIMES DAILY PRN
Qty: 56 G | Refills: 1 | Status: SHIPPED | OUTPATIENT
Start: 2025-06-16

## 2025-06-16 RX ORDER — IBUPROFEN 200 MG
200 TABLET ORAL EVERY 6 HOURS PRN
COMMUNITY

## 2025-06-16 NOTE — PROGRESS NOTES
Name: Jairon Oconnell      : 1965      MRN: [id number]  Encounter Provider: HUNTER Rose  Encounter Date: 2025   [facility] department[facility]  :  Assessment & Plan  Cellulitis of left upper extremity    Bug bite possibly on 2025  Redness increased over the weekend  Very itchy  Will start him on keflex   I personally marked the outside  Redness may get larger before it gets better               Orders:    hydrocortisone 1 % cream; Apply topically 4 (four) times a day as needed for irritation or rash    cephalexin (KEFLEX) 500 mg capsule; Take 1 capsule (500 mg total) by mouth in the morning and 1 capsule (500 mg total) before bedtime. Do all this for 10 days.           History of Present Illness   The patient is here today to discuss his left upper extremity / axilla area bug bit with cellulitis.   I reviewed their medical conditions, medications, laboratory and radiology studies.  I reviewed their scheduled future lab studies with the patient.     The patient voiced their understanding and agreement.   Follow up as scheduled .  Please continue to the PORCH section of the note for details of today's visit.           Insect Bite  Associated symptoms include a rash.     Review of Systems   Skin:  Positive for color change, rash and wound.       Objective   /78 (BP Location: Left arm, Patient Position: Sitting, Cuff Size: Large)   Pulse 91   Temp 97.9 °F (36.6 °C) (Tympanic)   Ht 6' (1.829 m)   Wt 85.3 kg (188 lb)   SpO2 97%   BMI 25.50 kg/m²      Physical Exam    Skin:           Administrative Statements   I have spent a total time of 20 minutes in caring for this patient on the day of the visit/encounter including Diagnostic results, Prognosis, Risks and benefits of tx options, Instructions for management, Patient and family education, Importance of tx compliance, Risk factor reductions, Impressions, Counseling / Coordination of care,  Documenting in the medical record, Reviewing/placing orders in the medical record (including tests, medications, and/or procedures), and Obtaining or reviewing history  .

## 2025-06-16 NOTE — ASSESSMENT & PLAN NOTE
Bug bite possibly on Friday 6/13/2025  Redness increased over the weekend  Very itchy  Will start him on keflex   I personally marked the outside  Redness may get larger before it gets better               Orders:    hydrocortisone 1 % cream; Apply topically 4 (four) times a day as needed for irritation or rash    cephalexin (KEFLEX) 500 mg capsule; Take 1 capsule (500 mg total) by mouth in the morning and 1 capsule (500 mg total) before bedtime. Do all this for 10 days.

## 2025-06-16 NOTE — LETTER
June 16, 2025     Patient: Jairon Oconnell   YOB: 1965   Date of Visit: 6/16/2025       To Whom it May Concern:    Jairon Oconnell was seen in my clinic on 6/16/2025. Due to his current Medical Diagnoses, Jairon is not competent to make his own decisions, manage money, nor have the ability to manage his own medications.         If you have any questions or concerns, please don't hesitate to call.         Sincerely,          HUNTER Rose        CC: No Recipients

## 2025-06-17 DIAGNOSIS — L03.114 CELLULITIS OF LEFT UPPER EXTREMITY: Chronic | ICD-10-CM

## 2025-06-17 RX ORDER — CEPHALEXIN 500 MG/1
CAPSULE ORAL
Qty: 20 CAPSULE | Refills: 0 | OUTPATIENT
Start: 2025-06-17

## 2025-07-01 NOTE — PLAN OF CARE
Problem: Potential for Falls  Goal: Patient will remain free of falls  Description: INTERVENTIONS:  - Educate patient/family on patient safety including physical limitations  - Instruct patient to call for assistance with activity   - Consult OT/PT to assist with strengthening/mobility   - Keep Call bell within reach  - Keep bed low and locked with side rails adjusted as appropriate  - Keep care items and personal belongings within reach  - Initiate and maintain comfort rounds  - Make Fall Risk Sign visible to staff  - Offer Toileting every 2 Hours, in advance of need  - Initiate/Maintain alarms  - Obtain necessary fall risk management equipment:   - Apply yellow socks and bracelet for high fall risk patients  - Consider moving patient to room near nurses station  2/8/2024 0804 by John Timmons RN  Outcome: Progressing  2/8/2024 0803 by John Timmons RN  Outcome: Progressing     Problem: PAIN - ADULT  Goal: Verbalizes/displays adequate comfort level or baseline comfort level  Description: Interventions:  - Encourage patient to monitor pain and request assistance  - Assess pain using appropriate pain scale  - Administer analgesics based on type and severity of pain and evaluate response  - Implement non-pharmacological measures as appropriate and evaluate response  - Consider cultural and social influences on pain and pain management  - Notify physician/advanced practitioner if interventions unsuccessful or patient reports new pain  2/8/2024 0804 by John Timmons RN  Outcome: Progressing  2/8/2024 0803 by John Timmons RN  Outcome: Progressing      PT left for CT.

## 2025-07-02 ENCOUNTER — TELEPHONE (OUTPATIENT)
Dept: UROLOGY | Facility: CLINIC | Age: 60
End: 2025-07-02

## 2025-07-07 ENCOUNTER — TELEPHONE (OUTPATIENT)
Age: 60
End: 2025-07-07

## 2025-07-07 NOTE — TELEPHONE ENCOUNTER
Betty called and stated that patient's Cellulitis of left upper extremity is still slightly red and very sore, she would like to know if patient needs another round of antibiotics. She also stated that she never received the ibuprofen for patient.    Please advise

## 2025-07-08 DIAGNOSIS — L03.114 CELLULITIS OF LEFT UPPER EXTREMITY: Primary | Chronic | ICD-10-CM

## 2025-07-08 RX ORDER — CEPHALEXIN 500 MG/1
500 CAPSULE ORAL 2 TIMES DAILY
Qty: 28 CAPSULE | Refills: 0 | Status: SHIPPED | OUTPATIENT
Start: 2025-07-08 | End: 2025-07-22

## 2025-07-08 NOTE — TELEPHONE ENCOUNTER
Can you let Betty know that I send another prescription for the keflex and for a few days longer. Also ask her to let us know if his arm improves or not. Thank you

## 2025-07-09 DIAGNOSIS — L03.114 CELLULITIS OF LEFT UPPER EXTREMITY: Chronic | ICD-10-CM

## 2025-07-10 RX ORDER — CEPHALEXIN 500 MG/1
CAPSULE ORAL
Qty: 28 CAPSULE | Refills: 0 | OUTPATIENT
Start: 2025-07-10

## 2025-08-05 DIAGNOSIS — G40.309 GENERALIZED CONVULSIVE EPILEPSY (HCC): Chronic | ICD-10-CM

## 2025-08-06 RX ORDER — LEVETIRACETAM 750 MG/1
TABLET ORAL
Qty: 112 TABLET | Refills: 0 | Status: SHIPPED | OUTPATIENT
Start: 2025-08-06

## 2025-08-13 ENCOUNTER — TELEPHONE (OUTPATIENT)
Age: 60
End: 2025-08-13